# Patient Record
Sex: MALE | Race: BLACK OR AFRICAN AMERICAN | NOT HISPANIC OR LATINO | Employment: OTHER | URBAN - METROPOLITAN AREA
[De-identification: names, ages, dates, MRNs, and addresses within clinical notes are randomized per-mention and may not be internally consistent; named-entity substitution may affect disease eponyms.]

---

## 2017-01-19 ENCOUNTER — TELEPHONE (OUTPATIENT)
Dept: PAIN MEDICINE | Facility: CLINIC | Age: 62
End: 2017-01-19

## 2017-01-23 ENCOUNTER — LAB VISIT (OUTPATIENT)
Dept: LAB | Facility: HOSPITAL | Age: 62
End: 2017-01-23
Attending: INTERNAL MEDICINE
Payer: MEDICARE

## 2017-01-23 DIAGNOSIS — Z94.4 LIVER TRANSPLANTED: ICD-10-CM

## 2017-01-23 LAB
ALBUMIN SERPL BCP-MCNC: 3.3 G/DL
ALP SERPL-CCNC: 209 U/L
ALT SERPL W/O P-5'-P-CCNC: 111 U/L
ANION GAP SERPL CALC-SCNC: 7 MMOL/L
ANISOCYTOSIS BLD QL SMEAR: SLIGHT
AST SERPL-CCNC: 117 U/L
BASOPHILS # BLD AUTO: 0.01 K/UL
BASOPHILS NFR BLD: 0.1 %
BILIRUB SERPL-MCNC: 0.6 MG/DL
BUN SERPL-MCNC: 19 MG/DL
CALCIUM SERPL-MCNC: 8.6 MG/DL
CHLORIDE SERPL-SCNC: 108 MMOL/L
CO2 SERPL-SCNC: 25 MMOL/L
CREAT SERPL-MCNC: 1.2 MG/DL
DIFFERENTIAL METHOD: ABNORMAL
EOSINOPHIL # BLD AUTO: 0.4 K/UL
EOSINOPHIL NFR BLD: 5 %
ERYTHROCYTE [DISTWIDTH] IN BLOOD BY AUTOMATED COUNT: 14.7 %
EST. GFR  (AFRICAN AMERICAN): >60 ML/MIN/1.73 M^2
EST. GFR  (NON AFRICAN AMERICAN): >60 ML/MIN/1.73 M^2
GLUCOSE SERPL-MCNC: 113 MG/DL
HCT VFR BLD AUTO: 39.6 %
HGB BLD-MCNC: 13.5 G/DL
LYMPHOCYTES # BLD AUTO: 2.9 K/UL
LYMPHOCYTES NFR BLD: 37.8 %
MCH RBC QN AUTO: 29.1 PG
MCHC RBC AUTO-ENTMCNC: 34.1 %
MCV RBC AUTO: 85 FL
MONOCYTES # BLD AUTO: 0.8 K/UL
MONOCYTES NFR BLD: 10.9 %
NEUTROPHILS # BLD AUTO: 3.5 K/UL
NEUTROPHILS NFR BLD: 46.2 %
OVALOCYTES BLD QL SMEAR: ABNORMAL
PLATELET # BLD AUTO: 146 K/UL
PLATELET BLD QL SMEAR: ABNORMAL
PMV BLD AUTO: ABNORMAL FL
POIKILOCYTOSIS BLD QL SMEAR: SLIGHT
POTASSIUM SERPL-SCNC: 4.2 MMOL/L
PROT SERPL-MCNC: 7.1 G/DL
RBC # BLD AUTO: 4.64 M/UL
SODIUM SERPL-SCNC: 140 MMOL/L
TACROLIMUS BLD-MCNC: 10.2 NG/ML
WBC # BLD AUTO: 7.64 K/UL

## 2017-01-23 PROCEDURE — 36415 COLL VENOUS BLD VENIPUNCTURE: CPT | Mod: PO

## 2017-01-23 PROCEDURE — 80197 ASSAY OF TACROLIMUS: CPT

## 2017-01-23 PROCEDURE — 85025 COMPLETE CBC W/AUTO DIFF WBC: CPT

## 2017-01-23 PROCEDURE — 80053 COMPREHEN METABOLIC PANEL: CPT

## 2017-01-23 NOTE — TELEPHONE ENCOUNTER
----- Message from Simona Aburto sent at 1/23/2017 12:13 PM CST -----  Contact: [phone]/ self   Pt its requesting a refill on rx blood sugar diagnostic Strp sent [phone]. Pt its requesting the prescription as soon as possible .  Please advise

## 2017-01-23 NOTE — TELEPHONE ENCOUNTER
----- Message from Rula Rousseau sent at 1/23/2017  9:29 AM CST -----  Contact: [phone] Ami monico Gonzalez would like to know the status of patient test strips, she states  [facility] reading was 300    [facility]

## 2017-01-24 NOTE — TELEPHONE ENCOUNTER
Call returned to pharmacy.  Pharmacy stated that Md needs to place IDC code on script for testing strips.  Dr. Lopez has been informed to send over script with ICD code.

## 2017-01-24 NOTE — TELEPHONE ENCOUNTER
Received a call from the phone staff. Spoke with the patient's wife. i informed her the prescription is being sent to [facility]. She verbalized understanding

## 2017-01-24 NOTE — TELEPHONE ENCOUNTER
Diabetic testing strips have just been sent over to [facility].  Pt's wife is aware.

## 2017-01-24 NOTE — TELEPHONE ENCOUNTER
----- Message from Yue Bailey sent at 1/24/2017  8:53 AM CST -----  Contact: [facility] [phone]   Calling to talk to nurse concerning patients test stripes does not have a diagnoses codes for insurance purpose. Please advice

## 2017-01-24 NOTE — TELEPHONE ENCOUNTER
Pharmacy stating that they can not fill the script without the ICD code.  Pt is currently at [facility].  Please advise

## 2017-01-25 NOTE — TELEPHONE ENCOUNTER
Call placed to pt's wife, Mrs. Gonzalez.  Mrs. Gonzalez was informed that pt's diabetic strips were sent over to pharmacy on yesterday electronically and receipt was confirmed.  Mrs. Gonzalez was also informed that a hard copy was faxed over this morning and that she is welcome to come pick it up to have.  Mrs. Gonzalez verbalized understanding.

## 2017-01-26 ENCOUNTER — TELEPHONE (OUTPATIENT)
Dept: TRANSPLANT | Facility: CLINIC | Age: 62
End: 2017-01-26

## 2017-01-26 NOTE — LETTER
January 26, 2017    Vick Gonzalez  [address] [facility] [address]          Dear Vick Gonzalez:  MRN: [id number]    Your liver lab results remain elevated but are stable.  There are no medicine changes.  Please have your labs drawn again on 2/27/17.      If you cannot have your labs drawn on the scheduled date, it is your responsibility to call the transplant department to reschedule.  To reschedule or make an appointment, please as to speak to or leave a message for my assistant, Portia Lincoln, at [phone].  When leaving a message for Latonia Pearce, or myself, we ask that you leave a brief message regarding your request.    Sincerely,    Kandy Herrera, RN, BSN  Liver Transplant Coordinator  [facility]  [address] [place] [address] [phone]

## 2017-02-21 ENCOUNTER — HOSPITAL ENCOUNTER (EMERGENCY)
Facility: HOSPITAL | Age: 62
Discharge: HOME | End: 2017-02-22
Attending: EMERGENCY MEDICINE
Payer: MEDICARE

## 2017-02-21 DIAGNOSIS — R51.9 HEADACHE, UNSPECIFIED HEADACHE TYPE: Primary | ICD-10-CM

## 2017-02-21 DIAGNOSIS — R05.9 COUGH: ICD-10-CM

## 2017-02-21 DIAGNOSIS — I10 ESSENTIAL HYPERTENSION: ICD-10-CM

## 2017-02-21 DIAGNOSIS — Z79.4 ENCOUNTER FOR LONG-TERM (CURRENT) USE OF INSULIN: ICD-10-CM

## 2017-02-21 DIAGNOSIS — R73.9 HYPERGLYCEMIA: ICD-10-CM

## 2017-02-21 DIAGNOSIS — Z94.4 LIVER TRANSPLANT STATUS: ICD-10-CM

## 2017-02-21 LAB
ALLENS TEST: ABNORMAL
B-OH-BUTYR BLD STRIP-SCNC: 0.1 MMOL/L
GLUCOSE SERPL-MCNC: 347 MG/DL (ref 70–110)
HCO3 UR-SCNC: 27.4 MMOL/L (ref 24–28)
HCT VFR BLD CALC: 46 %PCV (ref 36–54)
PCO2 BLDA: 53.3 MMHG (ref 35–45)
PH SMN: 7.32 [PH] (ref 7.35–7.45)
PO2 BLDA: 21 MMHG (ref 40–60)
POC BE: 1 MMOL/L
POC IONIZED CALCIUM: 1.28 MMOL/L (ref 1.06–1.42)
POC SATURATED O2: 30 % (ref 95–100)
POC TCO2: 29 MMOL/L (ref 24–29)
POCT GLUCOSE: 347 MG/DL (ref 70–110)
POTASSIUM BLD-SCNC: 4.4 MMOL/L (ref 3.5–5.1)
SAMPLE: ABNORMAL
SITE: ABNORMAL
SODIUM BLD-SCNC: 137 MMOL/L (ref 136–145)

## 2017-02-21 PROCEDURE — 84295 ASSAY OF SERUM SODIUM: CPT

## 2017-02-21 PROCEDURE — 83690 ASSAY OF LIPASE: CPT

## 2017-02-21 PROCEDURE — 96366 THER/PROPH/DIAG IV INF ADDON: CPT

## 2017-02-21 PROCEDURE — 99284 EMERGENCY DEPT VISIT MOD MDM: CPT | Mod: 25

## 2017-02-21 PROCEDURE — 96375 TX/PRO/DX INJ NEW DRUG ADDON: CPT

## 2017-02-21 PROCEDURE — 83036 HEMOGLOBIN GLYCOSYLATED A1C: CPT

## 2017-02-21 PROCEDURE — 82803 BLOOD GASES ANY COMBINATION: CPT

## 2017-02-21 PROCEDURE — 85610 PROTHROMBIN TIME: CPT

## 2017-02-21 PROCEDURE — 93005 ELECTROCARDIOGRAM TRACING: CPT

## 2017-02-21 PROCEDURE — 99900035 HC TECH TIME PER 15 MIN (STAT)

## 2017-02-21 PROCEDURE — 82962 GLUCOSE BLOOD TEST: CPT

## 2017-02-21 PROCEDURE — 85014 HEMATOCRIT: CPT

## 2017-02-21 PROCEDURE — 82330 ASSAY OF CALCIUM: CPT

## 2017-02-21 PROCEDURE — 25000003 PHARM REV CODE 250: Performed by: EMERGENCY MEDICINE

## 2017-02-21 PROCEDURE — 96361 HYDRATE IV INFUSION ADD-ON: CPT

## 2017-02-21 PROCEDURE — 99285 EMERGENCY DEPT VISIT HI MDM: CPT | Mod: ,,, | Performed by: EMERGENCY MEDICINE

## 2017-02-21 PROCEDURE — 83735 ASSAY OF MAGNESIUM: CPT

## 2017-02-21 PROCEDURE — 80053 COMPREHEN METABOLIC PANEL: CPT

## 2017-02-21 PROCEDURE — 93010 ELECTROCARDIOGRAM REPORT: CPT | Mod: ,,, | Performed by: INTERNAL MEDICINE

## 2017-02-21 PROCEDURE — 80197 ASSAY OF TACROLIMUS: CPT

## 2017-02-21 PROCEDURE — 63600175 PHARM REV CODE 636 W HCPCS: Performed by: EMERGENCY MEDICINE

## 2017-02-21 PROCEDURE — 83880 ASSAY OF NATRIURETIC PEPTIDE: CPT

## 2017-02-21 PROCEDURE — 96365 THER/PROPH/DIAG IV INF INIT: CPT

## 2017-02-21 PROCEDURE — 82010 KETONE BODYS QUAN: CPT

## 2017-02-21 PROCEDURE — 84132 ASSAY OF SERUM POTASSIUM: CPT

## 2017-02-21 PROCEDURE — 85025 COMPLETE CBC W/AUTO DIFF WBC: CPT

## 2017-02-21 RX ORDER — ONDANSETRON 2 MG/ML
4 INJECTION INTRAMUSCULAR; INTRAVENOUS
Status: COMPLETED | OUTPATIENT
Start: 2017-02-21 | End: 2017-02-21

## 2017-02-21 RX ORDER — MORPHINE SULFATE 2 MG/ML
4 INJECTION, SOLUTION INTRAMUSCULAR; INTRAVENOUS
Status: COMPLETED | OUTPATIENT
Start: 2017-02-21 | End: 2017-02-21

## 2017-02-21 RX ADMIN — SODIUM CHLORIDE 500 ML: 0.9 INJECTION, SOLUTION INTRAVENOUS at 11:02

## 2017-02-21 RX ADMIN — MORPHINE SULFATE 4 MG: 2 INJECTION, SOLUTION INTRAMUSCULAR; INTRAVENOUS at 11:02

## 2017-02-21 RX ADMIN — ONDANSETRON 4 MG: 2 INJECTION INTRAMUSCULAR; INTRAVENOUS at 11:02

## 2017-02-21 NOTE — ED AVS SNAPSHOT
[facility]  [address]  [facility] [address]               Vick Gonzalez   2017 10:53 PM   ED    Description:  Male : 1955   Department:  [facility]           Your Care was Coordinated By:     Provider Role From To    Shawnee Morejon MD Attending Provider 17 2191 --      Reason for Visit     Hyperglycemia           Diagnoses this Visit        Comments    Headache, unspecified headache type    -  Primary     Cough         Hyperglycemia         Essential hypertension           ED Disposition     None           To Do List           Follow-up Information     Schedule an appointment as soon as possible for a visit with Emanuel Lopez MD.    Specialty:  Family Medicine    Why:  For re-evaluation of your symptoms    Contact information:    [address]  [address]  [place] [id number] [phone]        [facility]     [facility] -  Assistance  Registered nurses in the [facility] provide clinical advisement, health education, appointment booking, and other advisory services.  Call for this free service at [phone].             Medications           Message regarding Medications     Verify the changes and/or additions to your medication regime listed below are the same as discussed with your clinician today.  If any of these changes or additions are incorrect, please notify your healthcare provider.        These medications were administered today        Dose Freq    morphine injection 4 mg 4 mg ED 1 Time    Sig: Inject 2 mLs (4 mg total) into the vein ED 1 Time.    Class: Normal    Route: Intravenous    ondansetron injection 4 mg 4 mg ED 1 Time    Sig: Inject 4 mg into the vein ED 1 Time.    Class: Normal    Route: Intravenous    sodium chloride 0.9% bolus 500 mL 500 mL ED 1 Time    Sig: Inject 500 mLs into the vein ED 1 Time.    Class: Normal    Route: Intravenous    magnesium  sulfate 2g in water 50mL IVPB (premix) 2 g ED 1 Time    Sig: Inject 50 mLs (2 g total) into the vein ED 1 Time.    Class: Normal    Route: Intravenous    insulin regular injection 10 Units 10 Units ED 1 Time    Sig: Inject 10 Units into the vein ED 1 Time.    Class: Normal    Route: Intravenous    oxycodone immediate release tablet 10 mg 10 mg ED 1 Time    Sig: Take 2 tablets (10 mg total) by mouth ED 1 Time.    Class: Normal    Route: Oral           Verify that the below list of medications is an accurate representation of the medications you are currently taking.  If none reported, the list may be blank. If incorrect, please contact your healthcare provider. Carry this list with you in case of emergency.           Current Medications     allopurinol (ZYLOPRIM) 100 MG tablet Take 1 tablet (100 mg total) by mouth 2 (two) times daily.    amitriptyline (ELAVIL) 25 MG tablet Take 1 tablet (25 mg total) by mouth every evening. For nerve pain and sleep    blood sugar diagnostic Strp 1 strip by Misc.(Non-Drug; Combo Route) route 4 (four) times daily before meals and nightly.    blood-glucose meter Misc 1 each by Misc.(Non-Drug; Combo Route) route 4 (four) times daily before meals and nightly.    calcium carbonate-vitamin D3 (CALTRATE 600 + D) 600 mg(1,500mg) -400 unit Chew     colchicine (COLCRYS) 0.6 mg tablet For impending gout attack take 2 colcrys followed by one one hour later.    gabapentin (NEURONTIN) 600 MG tablet Take 1 tablet (600 mg total) by mouth 4 (four) times daily.    insulin aspart (NOVOLOG) 100 unit/mL InPn pen Inject 12 Units into the skin 3 (three) times daily with meals.    insulin detemir (LEVEMIR FLEXTOUCH) 100 unit/mL (3 mL) SubQ InPn pen Inject 35 Units into the skin every evening.    lancets Misc 1 each by Misc.(Non-Drug; Combo Route) route 4 (four) times daily before meals and nightly.    lancing device Misc 1 each by Misc.(Non-Drug; Combo Route) route 4 (four) times daily before meals and  "nightly.    levothyroxine (SYNTHROID) 88 MCG tablet Take 1 tablet (88 mcg total) by mouth once daily.    lisinopril 10 MG tablet Take 1 tablet (10 mg total) by mouth once daily.    metoprolol tartrate (LOPRESSOR) 50 MG tablet TAKE 3 TABLETS BY MOUTH 2 (TWO) TIMES DAILY.    multivitamin (THERAGRAN) per tablet Take 1 tablet by mouth.    NEXIUM 40 mg capsule TAKE ONE CAPSULE BY MOUTH EVERY DAY    nifedipine (ADALAT CC) 30 MG TbSR TAKE 1 TABLET (30 MG TOTAL) BY MOUTH ONCE DAILY.    nifedipine 30 MG ORAL TR24 (PROCARDIA-XL) 30 MG (OSM) 24 hr tablet Take 1 tablet (30 mg total) by mouth once daily.    oxycodone (ROXICODONE) 15 MG Tab Take 1 tablet (15 mg total) by mouth every 6 (six) hours as needed for Pain.    pen needle, diabetic (ControlScan ULTRA-FINE MENDY PEN NEEDLES) 32 gauge x 5/32" Ndle Use with aspart TIDWM and with detemir QHS    sertraline (ZOLOFT) 100 MG tablet Take 1 tablet (100 mg total) by mouth once daily.    sildenafil (VIAGRA) 100 MG tablet Take 1 tablet (100 mg total) by mouth daily as needed for Erectile Dysfunction.    tacrolimus (PROGRAF) 1 MG Cap Take 2mg in AM and 1mg in PM by mouth Liver Transplant Z94.4    tea tree oil 100 % Oil Apply 1 mL topically 2 (two) times daily.    ZETIA 10 mg tablet TAKE 1 TABLET BY MOUTH EVERY DAY           Clinical Reference Information           Your Vitals Were     BP Pulse Temp Resp Height Weight    182/102 73 98.4 °F (36.9 °C) (Oral) 14 6' 1" (1.854 m) 99.8 kg (220 lb)    SpO2 BMI             96% 29.03 kg/m2         Allergies as of 2/22/2017        Reactions    Naproxen     Other reaction(s): problems w/liver/kid    Oxaprozin     Other reaction(s): cause problems w/kid/liver      Immunizations Administered on Date of Encounter - 2/22/2017     None      ED Micro, Lab, POCT     Start Ordered       Status Ordering Provider    02/22/17 0129 02/22/17 0128  POCT glucose  Once      Acknowledged     02/22/17 0036 02/22/17 0035  Tacrolimus level  Add-on      Completed     02/21/17 " 2355 02/21/17 2354  Brain natriuretic peptide  STAT      Final result     02/21/17 2339 02/21/17 2339  ISTAT PROCEDURE  Once      Final result     02/21/17 2321 02/21/17 2320  CBC auto differential  STAT      Final result     02/21/17 2321 02/21/17 2320  Comprehensive metabolic panel  STAT      Final result     02/21/17 2321 02/21/17 2320    STAT,   Status:  Canceled      Canceled     02/21/17 2321 02/21/17 2320  Lipase  STAT      Final result     02/21/17 2321 02/21/17 2320  Magnesium  STAT      Final result     02/21/17 2321 02/21/17 2320  Urinalysis  STAT      Final result     02/21/17 2321 02/21/17 2320  Protime-INR  STAT      Final result     02/21/17 2321 02/21/17 2320  Beta - Hydroxybutyrate, Serum  STAT      Final result     02/21/17 2321 02/21/17 2320  Hemoglobin A1c  Once      In process     02/21/17 2320 02/21/17 2320  Tacrolimus level  Once      In process     02/21/17 2320 02/21/17 2320  Urinalysis Microscopic  Once      Final result     02/21/17 2050 02/21/17 2050  POCT glucose  Once      Final result     02/21/17 2050 02/21/17 2049  POCT glucose  Once      Final result       ED Imaging Orders     Start Ordered       Status Ordering Provider    02/21/17 2321 02/21/17 2320  X-Ray Chest PA And Lateral  1 time imaging      Final result         Discharge Instructions       Keep blood sugar log. Keep a blood pressure log.   Take pain medication as needed for  Pain/headache   Drink plenty of water   Keep your follow up appointments     Future Appointments  Date Time Provider Department Center   2/27/2017 8:15 AM LAB, [facility] [place]          Your Scheduled Appointments     Feb 27, 2017  8:15 AM CST   Fasting Lab with ANDREY CONDE   [facility]    [address] [id number] [phone]              [facility] Sign-Up     Activating your [facility] account is as easy as 1-2-3!     1) Visit [email or web address], select Sign Up Now, enter this activation code and your  date of birth, then select Next.  [email or web address]  Expires: 4/8/2017  2:46 AM      2) Create a username and password to use when you visit [facility] in the future and select a security question in case you lose your password and select Next.    3) Enter your e-mail address and click Sign Up!    Additional Information  If you have questions, please e-mail [email or web address] or call [phone] to talk to our [facility] staff. Remember, [facility] is NOT to be used for urgent needs. For medical emergencies, dial 911.         Smoking Cessation     If you would like to quit smoking:   You may be eligible for free services if you are a [place] resident and started smoking cigarettes before September 1, 1988.  Call the Smoking Cessation Trust (SCT) toll free at (025) 624-0097 or (328) 109-4803.   Call 7-859-QUIT-NOW if you do not meet the above criteria.             [facility] complies with applicable Federal civil rights laws and does not discriminate on the basis of race, color, national origin, age, disability, or sex.        Language Assistance Services     ATTENTION: Language assistance services are available, free of charge. Please call [phone].      ATENCIÓN: Si habla español, tiene a palmer disposición servicios gratuitos de asistencia lingüística. Llame al [phone].     CHÚ Ý: N?u b?n nói Ti?ng Vi?t, có các d?ch v? h? tr? ngôn ng? mi?n phí dành cho b?n. G?i s? [phone].

## 2017-02-22 ENCOUNTER — TELEPHONE (OUTPATIENT)
Dept: FAMILY MEDICINE | Facility: CLINIC | Age: 62
End: 2017-02-22

## 2017-02-22 VITALS
SYSTOLIC BLOOD PRESSURE: 182 MMHG | DIASTOLIC BLOOD PRESSURE: 102 MMHG | BODY MASS INDEX: 29.16 KG/M2 | OXYGEN SATURATION: 96 % | WEIGHT: 220 LBS | HEIGHT: 73 IN | RESPIRATION RATE: 14 BRPM | TEMPERATURE: 98 F | HEART RATE: 73 BPM

## 2017-02-22 LAB
ALBUMIN SERPL BCP-MCNC: 3 G/DL
ALP SERPL-CCNC: 286 U/L
ALT SERPL W/O P-5'-P-CCNC: 106 U/L
ANION GAP SERPL CALC-SCNC: 8 MMOL/L
ANISOCYTOSIS BLD QL SMEAR: SLIGHT
AST SERPL-CCNC: 112 U/L
BACTERIA #/AREA URNS AUTO: ABNORMAL /HPF
BASOPHILS # BLD AUTO: 0 K/UL
BASOPHILS NFR BLD: 0 %
BILIRUB SERPL-MCNC: 0.5 MG/DL
BILIRUB UR QL STRIP: NEGATIVE
BNP SERPL-MCNC: 16 PG/ML
BUN SERPL-MCNC: 25 MG/DL
CALCIUM SERPL-MCNC: 9.2 MG/DL
CHLORIDE SERPL-SCNC: 102 MMOL/L
CLARITY UR REFRACT.AUTO: CLEAR
CO2 SERPL-SCNC: 23 MMOL/L
COLOR UR AUTO: YELLOW
CREAT SERPL-MCNC: 1.5 MG/DL
DACRYOCYTES BLD QL SMEAR: ABNORMAL
DIFFERENTIAL METHOD: ABNORMAL
EOSINOPHIL # BLD AUTO: 0.2 K/UL
EOSINOPHIL NFR BLD: 3.2 %
ERYTHROCYTE [DISTWIDTH] IN BLOOD BY AUTOMATED COUNT: 13.7 %
EST. GFR  (AFRICAN AMERICAN): 58.1 ML/MIN/1.73 M^2
EST. GFR  (NON AFRICAN AMERICAN): 50.2 ML/MIN/1.73 M^2
ESTIMATED AVG GLUCOSE: 212 MG/DL
GIANT PLATELETS BLD QL SMEAR: PRESENT
GLUCOSE SERPL-MCNC: 488 MG/DL
GLUCOSE UR QL STRIP: ABNORMAL
HBA1C MFR BLD HPLC: 9 %
HCT VFR BLD AUTO: 43 %
HGB BLD-MCNC: 14.5 G/DL
HGB UR QL STRIP: ABNORMAL
HYALINE CASTS UR QL AUTO: 5 /LPF
HYPOCHROMIA BLD QL SMEAR: ABNORMAL
INR PPP: 1
KETONES UR QL STRIP: NEGATIVE
LEUKOCYTE ESTERASE UR QL STRIP: NEGATIVE
LIPASE SERPL-CCNC: 63 U/L
LYMPHOCYTES # BLD AUTO: 2.3 K/UL
LYMPHOCYTES NFR BLD: 36.8 %
MAGNESIUM SERPL-MCNC: 1.2 MG/DL
MCH RBC QN AUTO: 29.1 PG
MCHC RBC AUTO-ENTMCNC: 33.7 %
MCV RBC AUTO: 86 FL
MICROSCOPIC COMMENT: ABNORMAL
MONOCYTES # BLD AUTO: 0.5 K/UL
MONOCYTES NFR BLD: 7.6 %
NEUTROPHILS # BLD AUTO: 3.2 K/UL
NEUTROPHILS NFR BLD: 51.8 %
NITRITE UR QL STRIP: NEGATIVE
OVALOCYTES BLD QL SMEAR: ABNORMAL
PH UR STRIP: 6 [PH] (ref 5–8)
PLATELET # BLD AUTO: 124 K/UL
PLATELET BLD QL SMEAR: ABNORMAL
PMV BLD AUTO: ABNORMAL FL
POIKILOCYTOSIS BLD QL SMEAR: SLIGHT
POTASSIUM SERPL-SCNC: 4.7 MMOL/L
PROT SERPL-MCNC: 7 G/DL
PROT UR QL STRIP: ABNORMAL
PROTHROMBIN TIME: 11 SEC
RBC # BLD AUTO: 4.99 M/UL
RBC #/AREA URNS AUTO: 1 /HPF (ref 0–4)
SODIUM SERPL-SCNC: 133 MMOL/L
SP GR UR STRIP: 1.01 (ref 1–1.03)
SQUAMOUS #/AREA URNS AUTO: 1 /HPF
TACROLIMUS BLD-MCNC: 14.4 NG/ML
URN SPEC COLLECT METH UR: ABNORMAL
UROBILINOGEN UR STRIP-ACNC: NEGATIVE EU/DL
WBC # BLD AUTO: 6.2 K/UL
WBC #/AREA URNS AUTO: 1 /HPF (ref 0–5)
YEAST UR QL AUTO: ABNORMAL

## 2017-02-22 PROCEDURE — 81001 URINALYSIS AUTO W/SCOPE: CPT

## 2017-02-22 PROCEDURE — 63600175 PHARM REV CODE 636 W HCPCS: Performed by: EMERGENCY MEDICINE

## 2017-02-22 PROCEDURE — 25000003 PHARM REV CODE 250: Performed by: EMERGENCY MEDICINE

## 2017-02-22 PROCEDURE — 82962 GLUCOSE BLOOD TEST: CPT

## 2017-02-22 RX ORDER — MAGNESIUM SULFATE HEPTAHYDRATE 40 MG/ML
2 INJECTION, SOLUTION INTRAVENOUS
Status: COMPLETED | OUTPATIENT
Start: 2017-02-22 | End: 2017-02-22

## 2017-02-22 RX ORDER — OXYCODONE HYDROCHLORIDE 5 MG/1
10 TABLET ORAL
Status: COMPLETED | OUTPATIENT
Start: 2017-02-22 | End: 2017-02-22

## 2017-02-22 RX ADMIN — OXYCODONE HYDROCHLORIDE 10 MG: 5 TABLET ORAL at 01:02

## 2017-02-22 RX ADMIN — MAGNESIUM SULFATE IN WATER 2 G: 40 INJECTION, SOLUTION INTRAVENOUS at 12:02

## 2017-02-22 RX ADMIN — INSULIN HUMAN 10 UNITS: 100 INJECTION, SOLUTION PARENTERAL at 01:02

## 2017-02-22 NOTE — DISCHARGE INSTRUCTIONS
Keep blood sugar log. Keep a blood pressure log.   Take pain medication as needed for  Pain/headache   Drink plenty of water   Keep your follow up appointments     Future Appointments  Date Time Provider Department Center   2/27/2017 8:15 AM LAB, [facility] [place]

## 2017-02-22 NOTE — TELEPHONE ENCOUNTER
----- Message from Chelsea Hameed sent at 2/22/2017  8:28 AM CST -----  Contact: [phone]  Pt need to be seen sooner than 4/18/2017 due to his glucose levels being 585 and was admitted to ER glucose was down to 285 at discharge pt was told to f/u with his PCP for medication evaluation ASAP. Please advise.

## 2017-02-22 NOTE — TELEPHONE ENCOUNTER
----- Message from Yue Bailey sent at 2/22/2017 10:02 AM CST -----  Contact: Self [phone]  Patient Returning Your Phone Call

## 2017-02-22 NOTE — ED PROVIDER NOTES
Encounter Date: 2/21/2017    SCRIBE #1 NOTE: I, Isidro Pérez, am scribing for, and in the presence of,  Dr. Morejon. I have scribed the entire note.       History     Chief Complaint   Patient presents with    Hyperglycemia     patient reports a blood sugar of 580 at home. Pt had a liver transplant in 2009.     Review of patient's allergies indicates:   Allergen Reactions    Naproxen      Other reaction(s): problems w/liver/kid    Oxaprozin      Other reaction(s): cause problems w/kid/liver     HPI Comments: Time patient was seen by the provider: 11:05 PM      The patient is a 61 y.o. male with hx of: DM, HTN, and liver transplant(2010) that presents to the ED with a complaint of hyperglycemia, which has persisted for the last 2 weeks. Blood sugar at home reported at 580. He notes an associated headache, urinary frequency, and non productive cough, which have coincided with elevated blood sugar. Pt endorses compliance with his medication regimen and denies any recent changes to this regimen. He denies any burning urination.   The history is provided by the patient.     Past Medical History   Diagnosis Date    Anemia     Anxiety     Chronic hepatitis C virus infection - RELAPSE following harvoni + RBV 8/23/2011     Completed 12 weeks Harvoni + RBV w/ RELAPSE 6 weeks out (3/2016)  Wk 1 Hgb 12.4, prograf 6.8 Wk 2 Hgb 12.7 Wk 4 Hgb 12.8, prograf 5.4; HCV RNA 47. INCREASE RBV to 600 Wk 5 Hgb 12.6 Wk 6 Hgb 12.4, HCV RNA <12, detected. INCREASE  Wk 7 Hgb 11.8 Wk 8 Hgb 11.7, prograf 4.0. INCREASE RBV 1000, INCREASE PROGRAF 2/1 Wk 9 Hgb 12.4, prograf 3.6  HCV RNA <12, detected. INCREASE PROGRAF 2/2 Wk 10 hgb     Chronic pain syndrome 7/13/2011    Diabetes mellitus type II, uncontrolled     Discitis of lumbosacral region 1/16/2015    ED (erectile dysfunction)     Genital herpes     Gout, arthritis     History of alcohol abuse     History of positive PPD, treatment status unknown      Pulmonary granulomas,  negative sputum cultures for AFB and indeterminate quantferon test    History of substance abuse     Hypertension     Hypothyroidism     Peptic ulcer disease      Past Medical History Pertinent Negatives   Diagnosis Date Noted    *Atrial fibrillation 6/28/2012    Cataract 6/28/2012    Clotting disorder 6/28/2012    Degenerative disc disease 6/28/2012     Past Surgical History   Procedure Laterality Date    Liver transplant  06/2010    Cholecystectomy      Spine surgery       Family History   Problem Relation Age of Onset    Cancer Mother     Diabetes Mother     Heart disease Mother     Diabetes Sister     Cancer Maternal Uncle 82     colon CA    Melanoma Neg Hx     Psoriasis Neg Hx     Lupus Neg Hx     Eczema Neg Hx     Acne Neg Hx      Social History   Substance Use Topics    Smoking status: Former Smoker    Smokeless tobacco: Never Used    Alcohol use No      Comment: over 5 years ago, none currently     Review of Systems   Constitutional: Negative for fever.   HENT: Negative for sore throat.    Respiratory: Positive for cough. Negative for shortness of breath.    Cardiovascular: Negative for chest pain.   Gastrointestinal: Negative for nausea.   Genitourinary: Positive for frequency. Negative for dysuria.        No burning urination   Musculoskeletal: Negative for back pain.   Skin: Negative for rash.   Neurological: Positive for headaches. Negative for weakness.   Hematological: Does not bruise/bleed easily.       Physical Exam   Initial Vitals   BP Pulse Resp Temp SpO2   02/21/17 2048 02/21/17 2048 02/21/17 2048 02/21/17 2048 02/21/17 2048   177/101 84 18 98.4 °F (36.9 °C) 96 %     Physical Exam    Nursing note and vitals reviewed.  Constitutional: He appears well-developed and well-nourished. No distress.   HENT:   Head: Normocephalic and atraumatic.   Mouth/Throat: Oropharynx is clear and moist.   Eyes: Conjunctivae are normal.   Neck: Normal range of motion.   Cardiovascular: Normal  rate, regular rhythm and normal heart sounds.   Pulmonary/Chest:   Crackles at the bilateral bases   Abdominal: Soft. He exhibits no distension.   Generalized abdominal tenderness    Musculoskeletal: Normal range of motion.   Neurological: He is alert and oriented to person, place, and time.   Skin: Skin is warm and dry.         ED Course   Procedures  Labs Reviewed   CBC W/ AUTO DIFFERENTIAL - Abnormal; Notable for the following:        Result Value    Platelets 124 (*)     Platelet Estimate Decreased (*)     All other components within normal limits   COMPREHENSIVE METABOLIC PANEL - Abnormal; Notable for the following:     Sodium 133 (*)     Glucose 488 (*)     BUN, Bld 25 (*)     Creatinine 1.5 (*)     Albumin 3.0 (*)     Alkaline Phosphatase 286 (*)      (*)      (*)     eGFR if  58.1 (*)     eGFR if non  50.2 (*)     All other components within normal limits   LIPASE - Abnormal; Notable for the following:     Lipase 63 (*)     All other components within normal limits   MAGNESIUM - Abnormal; Notable for the following:     Magnesium 1.2 (*)     All other components within normal limits   URINALYSIS - Abnormal; Notable for the following:     Protein, UA 1+ (*)     Glucose, UA 4+ (*)     Occult Blood UA Trace (*)     All other components within normal limits   URINALYSIS MICROSCOPIC - Abnormal; Notable for the following:     Hyaline Casts, UA 5 (*)     All other components within normal limits   POCT GLUCOSE MONITORING CONTINUOUS - Abnormal; Notable for the following:     POC Glucose 347 (*)     All other components within normal limits   POCT GLUCOSE - Abnormal; Notable for the following:     POCT Glucose 347 (*)     All other components within normal limits   ISTAT PROCEDURE - Abnormal; Notable for the following:     POC PH 7.318 (*)     POC PCO2 53.3 (*)     POC PO2 21 (*)     POC SATURATED O2 30 (*)     All other components within normal limits   PROTIME-INR   BETA  - HYDROXYBUTYRATE, SERUM   B-TYPE NATRIURETIC PEPTIDE   TACROLIMUS LEVEL   HEMOGLOBIN A1C   TACROLIMUS LEVEL    Narrative:     add on TACRO order-[id number] per MD Shawnee Morejon 01:08  02/22/2017    POCT GLUCOSE MONITORING CONTINUOUS     EKG Readings: (Independently Interpreted)   Sinus at 75, no STEMI, non specific ST depression       X-Rays:   Independently Interpreted Readings:   Chest X-Ray: Normal heart size.  No infiltrates.  No acute abnormalities.     Medical Decision Making:   History:   Old Medical Records: I decided to obtain old medical records.  Initial Assessment:   Emergent evaluation of hyperglycemia. Pt has a history of liver transplant and reports compliance with medications. Will evaluate for DKA, electrolyte abnormality, or infectious etiologies.   Independently Interpreted Test(s):   I have ordered and independently interpreted X-rays - see prior notes.  I have ordered and independently interpreted EKG Reading(s) - see prior notes  Clinical Tests:   Lab Tests: Ordered and Reviewed  Radiological Study: Ordered and Reviewed  Medical Tests: Ordered and Reviewed            Scribe Attestation:   Scribe #1: I performed the above scribed service and the documentation accurately describes the services I performed. I attest to the accuracy of the note.    Attending Attestation:           Physician Attestation for Scribe:  Physician Attestation Statement for Scribe #1: I, Dr. Morejon, reviewed documentation, as scribed by Isidro Pérez in my presence, and it is both accurate and complete.         Attending ED Notes:   Pt's PH is 7.3 but otherwise has no signs of DKA. LFTs are not significantly changed. Magnesium low and replacement was given. His headache has been unchanged by morphine and magnesium. Will try oxycodone and give dose of insulin. Pt had follow up appointment with [facility] primary care doctor this week and missed it.     Update:patients symptoms resolved. Recommended glucose log needs to follow up  with primary care doctor for insulin adjustments. I don't feel the patient needs to be admitted to the hospital at this time. Return precautions advised.           ED Course     Clinical Impression:   The primary encounter diagnosis was Headache, unspecified headache type. Diagnoses of Cough, Hyperglycemia, and Essential hypertension were also pertinent to this visit.    Disposition:   Disposition: Discharged  Condition: Stable       Shawnee Morejon MD  02/22/17 0323

## 2017-02-22 NOTE — TELEPHONE ENCOUNTER
Call returned to pt.  Pt stated that his blood sugar has been elevated and would like to be seen.  Pt requested to be seen as soon as possible.  Pt has an appt with Dr. Perkins on tomorrow.

## 2017-02-22 NOTE — ED TRIAGE NOTES
61 year old male presents to the ED c/o hyperglycemia. Reports no changes in diet and has been compliant with medication.

## 2017-02-22 NOTE — TELEPHONE ENCOUNTER
----- Message from Cecilia Vega sent at 2/22/2017  1:54 PM CST -----  Contact: [phone]/ pt's wife   Patient called in returning your call about scheduling. Please advise.

## 2017-02-23 ENCOUNTER — OFFICE VISIT (OUTPATIENT)
Dept: FAMILY MEDICINE | Facility: CLINIC | Age: 62
End: 2017-02-23
Payer: MEDICARE

## 2017-02-23 VITALS
DIASTOLIC BLOOD PRESSURE: 91 MMHG | HEART RATE: 101 BPM | WEIGHT: 270.06 LBS | OXYGEN SATURATION: 97 % | HEIGHT: 73 IN | BODY MASS INDEX: 35.79 KG/M2 | SYSTOLIC BLOOD PRESSURE: 145 MMHG

## 2017-02-23 DIAGNOSIS — I10 BENIGN ESSENTIAL HTN: ICD-10-CM

## 2017-02-23 DIAGNOSIS — E11.65 UNCONTROLLED TYPE 2 DIABETES MELLITUS WITH OTHER DIABETIC KIDNEY COMPLICATION, UNSPECIFIED LONG TERM INSULIN USE STATUS: ICD-10-CM

## 2017-02-23 DIAGNOSIS — E11.29 UNCONTROLLED TYPE 2 DIABETES MELLITUS WITH OTHER DIABETIC KIDNEY COMPLICATION, UNSPECIFIED LONG TERM INSULIN USE STATUS: ICD-10-CM

## 2017-02-23 DIAGNOSIS — Z79.4 TYPE 2 DIABETES MELLITUS WITH HYPERGLYCEMIA, WITH LONG-TERM CURRENT USE OF INSULIN: ICD-10-CM

## 2017-02-23 DIAGNOSIS — E11.65 TYPE 2 DIABETES MELLITUS WITH HYPERGLYCEMIA, WITH LONG-TERM CURRENT USE OF INSULIN: ICD-10-CM

## 2017-02-23 PROCEDURE — 99215 OFFICE O/P EST HI 40 MIN: CPT | Mod: S$PBB,,, | Performed by: FAMILY MEDICINE

## 2017-02-23 PROCEDURE — 99215 OFFICE O/P EST HI 40 MIN: CPT | Mod: PBBFAC,PO | Performed by: FAMILY MEDICINE

## 2017-02-23 PROCEDURE — 99999 PR PBB SHADOW E&M-EST. PATIENT-LVL V: CPT | Mod: PBBFAC,,, | Performed by: FAMILY MEDICINE

## 2017-02-23 RX ORDER — NIFEDIPINE 60 MG/1
60 TABLET, EXTENDED RELEASE ORAL DAILY
Qty: 90 TABLET | Refills: 11 | Status: SHIPPED | OUTPATIENT
Start: 2017-02-23 | End: 2018-02-24 | Stop reason: SDUPTHER

## 2017-02-23 NOTE — PROGRESS NOTES
(Portions of this note were dictated using voice recognition software and may contain dictation related errors in spelling/grammar/syntax not found on text review)    CC:   Chief Complaint   Patient presents with    Blood Sugar Problem       HPI: 61 y.o. male presents for hospital and emergency room follow-up.  My last visit with him was in August 2016 Is a history of diabetes which has been typically fairly well diet controlled as of my last visit with him, in the I FG status.  His A1c was 5.6 earlier in January 2016.  After my visit, repeat testing demonstrated A1c up to 8.3.  I started him on metformin at the time.  However, he presented later to the hospital on October 4, 2015 complaining of abdominal pain, lower back pain, flank pain, increased appetite, nausea, polydipsia, and polyuria.  There was no reported vomiting or weight loss.  At that time, his blood sugar was in the 1000 range.  He was diagnosed with hyperosmolar state and DKA.  He was started on insulin therapy in the hospital and given fluids.  He was converted over to basal/prandial insulin as per the hospital discharge summary, he was discharged on Levemir 34 units daily and NovoLog 12 units with meals    He was supposed to follow-up in the clinic earlier this week but did not have a ride and missed his appointment.  He later presented to the emergency room complaining of a blood sugar of 580 at home.  He was complaining of associated headache, urinary frequency, and cough alongside this elevated blood sugar.  He had denied any compliance problems with his current therapy.  His blood sugar in the emergency room was 488, sodium 133, creatinine 1.5, which is up from a fairly normal baseline for after his hospitalization.  ALT is chronically elevated since hospital stay.  He also had a CT of the abdomen and pelvis.  Pancreas is unremarkable, adrenal glands unremarkable.  Small splenic granulomas noted.  Bowel is except for diverticulosis without  diverticulitis.  Kidneys normal with some vascular calcifications noted otherwise no evidence of hydronephrosis no other structural disease    Presents today in the office for further review.  He does complain of polyuria, dry mouth, polydipsia.  Complains of loss of sex drive.  No chest pain or shortness of breath.  He is compliant with the above therapy, up to 36 units of Levemir at this time and 12 units of NovoLog with food.  He checks his sugars the morning when he wakes up before eating in the area usually in the 200-300 range or no 400 readings.  No readings less than 200.  He will take his NovoLog dose at that time with breakfast.  At 12 noon he will check his sugar again prior to lunch and getting readings around 180s.  Denies any 200 readings at that time.  He will take his lunchtime NovoLog with lunch at 12 units again.  3:00 PM he checks his sugars and gets around 200 readings, and will then later take his dinner NovoLog and eat dinner.  Denies any hypoglycemia    High blood pressure: On lisinopril 10 mg a day, metoprolol 50 mrem twice a day, nifedipine 30 mg a day.  Blood pressures are high at home usually above goal of 140/90.  Compliant with therapy        Past Medical History   Diagnosis Date    Anemia     Anxiety     Chronic hepatitis C virus infection - RELAPSE following harvoni + RBV 8/23/2011     Completed 12 weeks Harvoni + RBV w/ RELAPSE 6 weeks out (3/2016)  Wk 1 Hgb 12.4, prograf 6.8 Wk 2 Hgb 12.7 Wk 4 Hgb 12.8, prograf 5.4; HCV RNA 47. INCREASE RBV to 600 Wk 5 Hgb 12.6 Wk 6 Hgb 12.4, HCV RNA <12, detected. INCREASE  Wk 7 Hgb 11.8 Wk 8 Hgb 11.7, prograf 4.0. INCREASE RBV 1000, INCREASE PROGRAF 2/1 Wk 9 Hgb 12.4, prograf 3.6  HCV RNA <12, detected. INCREASE PROGRAF 2/2 Wk 10 hgb     Chronic pain syndrome 7/13/2011    Diabetes mellitus type II, uncontrolled     Discitis of lumbosacral region 1/16/2015    ED (erectile dysfunction)     Genital herpes     Gout, arthritis      History of alcohol abuse     History of positive PPD, treatment status unknown      Pulmonary granulomas, negative sputum cultures for AFB and indeterminate quantferon test    History of substance abuse     Hypertension     Hypothyroidism     Peptic ulcer disease        Past Surgical History   Procedure Laterality Date    Liver transplant  06/2010    Cholecystectomy      Spine surgery         Family History   Problem Relation Age of Onset    Cancer Mother     Diabetes Mother     Heart disease Mother     Diabetes Sister     Cancer Maternal Uncle 82     colon CA    Melanoma Neg Hx     Psoriasis Neg Hx     Lupus Neg Hx     Eczema Neg Hx     Acne Neg Hx        Social History     Social History    Marital status:      Spouse name: N/A    Number of children: N/A    Years of education: N/A     Occupational History    Not on file.     Social History Main Topics    Smoking status: Former Smoker    Smokeless tobacco: Never Used    Alcohol use No      Comment: over 5 years ago, none currently    Drug use: No    Sexual activity: Not on file     Other Topics Concern    Not on file     Social History Narrative     SCREENINGS  Colonoscopy 2015, return in 10 years   prostate testing 2016/8     Immunizations  Pneumovax up-to-date 2008  Hepatitis B series up-to-date  Patient states he had tetanus shot within the last 10 years  Flu: UTD    Lab Results   Component Value Date    WBC 6.20 02/21/2017    HGB 14.5 02/21/2017    HCT 46 02/21/2017     (L) 02/21/2017    CHOL 97 (L) 10/06/2016    TRIG 276 (H) 10/06/2016    HDL 21 (L) 10/06/2016     (H) 02/21/2017     (H) 02/21/2017     (L) 02/21/2017    K 4.7 02/21/2017     02/21/2017    CREATININE 1.5 (H) 02/21/2017    BUN 25 (H) 02/21/2017    CO2 23 02/21/2017    TSH 1.941 09/06/2016    PSA 0.30 09/06/2016    INR 1.0 02/21/2017    HGBA1C 9.0 (H) 02/21/2017    LDLCALC 20.8 (L) 10/06/2016     (HH) 02/21/2017        ROS:  GENERAL: Malaise.  SKIN: No rashes, no itching.  HEAD: No headaches.  EYES: No visual changes  EARS: No ear pain or changes in hearing.  NOSE: No congestion or rhinorrhea.  MOUTH & THROAT: No hoarseness, change in voice, or sore throat.  NODES: Denies swollen glands.  CHEST: Denies NARAYANAN, cyanosis, wheezing, cough and sputum production.  CARDIOVASCULAR: Denies chest pain, PND, orthopnea.  ABDOMEN: Above.  URINARY: Above.  PERIPHERAL VASCULAR: No claudication or cyanosis.  MUSCULOSKELETAL: No joint stiffness or swelling. Denies back pain.  NEUROLOGIC: Does get burning pain in his legs    Vital signs reviewed  PE:   APPEARANCE: Well nourished, well developed, in no acute distress.    HEAD: Normocephalic, atraumatic.  EYES: PERRL. EOMI.   Conjunctivae noninjected.  EARS: TM's intact. Light reflex normal. No retraction or perforation  NOSE: Mucosa pink. Airway clear.  MOUTH & THROAT: No tonsillar enlargement. No pharyngeal erythema or exudate.   NECK: Supple with no cervical lymphadenopathy.  No carotid bruits.  No thyromegaly  CHEST: Good inspiratory effort. Lungs clear to auscultation with no wheezes or crackles.  CARDIOVASCULAR: Normal S1, S2. No rubs, murmurs, or gallops.  ABDOMEN: Bowel sounds normal. Not distended. Soft. No tenderness  EXTREMITIES: Minimal ankle edema bilaterally      IMPRESSION  1. Uncontrolled type 2 diabetes mellitus with diabetic neuropathy, with long-term current use of insulin    2. Uncontrolled type 2 diabetes mellitus with other diabetic kidney complication, unspecified long term insulin use status    3. Benign essential HTN    4. Type 2 diabetes mellitus with hyperglycemia, with long-term current use of insulin            PLAN  Orders Placed This Encounter   Procedures    Ambulatory consult to Diabetic Education     Reviewed hospitalization summary.  Reviewed his labs as above.  Reviewed imaging as above    Diabetes health maintenance:  -- advise regular and consistent glucose  monitoring and medication compliance.  -- advise daily foot checks  -- advise yearly ophthalmologic exams  -- advise adequate dietary and exercise modification  -- advise regular dental visits  -- advise daily low-dose aspirin use if patient is not on other anticoagulants and there are no other contraindications.  -- Medication management: Increase Levemir up to 45 units daily.  3 day titration protocol provided per patient instructions.  Continue NovoLog 12 units with meals.  Correction scale provided per patient instructions    He is instructed on charting his blood sugars both fasting and before meals and before bedtime.  Record down these numbers in his chart and bring them in to follow-up visit in one month.  In the meantime he can call with any concerns about spiking blood sugars.  I have set up a consult with diabetes education and nutrition management.    Hypertension not controlled.  Increase nifedipine to 60 mg daily.  Continue lisinopril 10 mg daily and metoprolol 50 mg twice a day    RTC one month or sooner if needed

## 2017-02-23 NOTE — PATIENT INSTRUCTIONS
Blood pressure:  You're on lisinopril 10 mg daily, metoprolol 50 mg twice daily, nifedipine 30 mg daily. I'll INCREASE your nifedipine to 60 mg daily        Diabetes:  Guideline for managing Levemir insulin: start by going up to 45 units daily    Check your fasting blood sugars every morning for 3 days.  Goal is to have most of your fasting blood sugars below 140.    If most of your fasting blood sugars in that 3 day period are above 140, increase your Levemir dose by 3 units.    If most of your fasting blood sugars in that 3 day period of are below 140 but higher than 70, keep your Levemir dose the same.    If most of your fasting blood sugars in that 3 day period are below 70, decrease your Levemir dose by 3 units.    Make sure to write your blood sugars down in a notebook; do not simply just store the numbers in your glucose meter.        Novolog: keep at 12 units with meals now    Sliding Scale for Novolog             Blood Glucose reading    :     how many extra units of Novolog to take                150-200                    :           +2                 200-250  : +4      250-300  : +6                 300-350  :  +8      >350   :           +10      SAMPLE BLOOD SUGAR CHART                 DATE  Before breakfast Before lunch Before dinner Before bedtime

## 2017-02-25 LAB
POCT GLUCOSE: 282 MG/DL (ref 70–110)
POCT GLUCOSE: 330 MG/DL (ref 70–110)

## 2017-02-27 ENCOUNTER — HOSPITAL ENCOUNTER (OUTPATIENT)
Facility: HOSPITAL | Age: 62
Discharge: HOME | End: 2017-02-28
Attending: EMERGENCY MEDICINE | Admitting: INTERNAL MEDICINE
Payer: MEDICARE

## 2017-02-27 ENCOUNTER — TELEPHONE (OUTPATIENT)
Dept: TRANSPLANT | Facility: CLINIC | Age: 62
End: 2017-02-27

## 2017-02-27 DIAGNOSIS — R73.9 HYPERGLYCEMIA: Primary | ICD-10-CM

## 2017-02-27 LAB
ALBUMIN SERPL BCP-MCNC: 3.2 G/DL
ALP SERPL-CCNC: 276 U/L
ALT SERPL W/O P-5'-P-CCNC: 119 U/L
ANION GAP SERPL CALC-SCNC: 10 MMOL/L
ANISOCYTOSIS BLD QL SMEAR: SLIGHT
AST SERPL-CCNC: 146 U/L
BASOPHILS # BLD AUTO: 0.01 K/UL
BASOPHILS NFR BLD: 0.2 %
BILIRUB SERPL-MCNC: 0.8 MG/DL
BUN SERPL-MCNC: 24 MG/DL (ref 6–30)
BUN SERPL-MCNC: 27 MG/DL
CALCIUM SERPL-MCNC: 9.1 MG/DL
CHLORIDE SERPL-SCNC: 102 MMOL/L (ref 95–110)
CHLORIDE SERPL-SCNC: 99 MMOL/L
CO2 SERPL-SCNC: 23 MMOL/L
CREAT SERPL-MCNC: 1.2 MG/DL (ref 0.5–1.4)
CREAT SERPL-MCNC: 1.7 MG/DL
DIFFERENTIAL METHOD: ABNORMAL
EOSINOPHIL # BLD AUTO: 0.3 K/UL
EOSINOPHIL NFR BLD: 4.5 %
ERYTHROCYTE [DISTWIDTH] IN BLOOD BY AUTOMATED COUNT: 13.5 %
EST. GFR  (AFRICAN AMERICAN): 49.9 ML/MIN/1.73 M^2
EST. GFR  (NON AFRICAN AMERICAN): 43.2 ML/MIN/1.73 M^2
GLUCOSE SERPL-MCNC: 404 MG/DL
GLUCOSE SERPL-MCNC: 98 MG/DL (ref 70–110)
HCT VFR BLD AUTO: 41.5 %
HCT VFR BLD CALC: 39 %PCV (ref 36–54)
HGB BLD-MCNC: 14.1 G/DL
HYPOCHROMIA BLD QL SMEAR: ABNORMAL
INR PPP: 1
LIPASE SERPL-CCNC: 152 U/L
LYMPHOCYTES # BLD AUTO: 2.4 K/UL
LYMPHOCYTES NFR BLD: 38.6 %
MCH RBC QN AUTO: 29.1 PG
MCHC RBC AUTO-ENTMCNC: 34 %
MCV RBC AUTO: 86 FL
MONOCYTES # BLD AUTO: 0.5 K/UL
MONOCYTES NFR BLD: 7.8 %
NEUTROPHILS # BLD AUTO: 3 K/UL
NEUTROPHILS NFR BLD: 48.9 %
OVALOCYTES BLD QL SMEAR: ABNORMAL
PLATELET # BLD AUTO: 127 K/UL
PMV BLD AUTO: ABNORMAL FL
POC IONIZED CALCIUM: 1.12 MMOL/L (ref 1.06–1.42)
POC TCO2 (MEASURED): 24 MMOL/L (ref 23–29)
POCT GLUCOSE: 125 MG/DL (ref 70–110)
POCT GLUCOSE: 376 MG/DL (ref 70–110)
POIKILOCYTOSIS BLD QL SMEAR: SLIGHT
POLYCHROMASIA BLD QL SMEAR: ABNORMAL
POTASSIUM BLD-SCNC: 3.3 MMOL/L (ref 3.5–5.1)
POTASSIUM SERPL-SCNC: 3.9 MMOL/L
PROT SERPL-MCNC: 7.3 G/DL
PROTHROMBIN TIME: 10.9 SEC
RBC # BLD AUTO: 4.84 M/UL
SAMPLE: ABNORMAL
SODIUM BLD-SCNC: 139 MMOL/L (ref 136–145)
SODIUM SERPL-SCNC: 132 MMOL/L
TSH SERPL DL<=0.005 MIU/L-ACNC: 3.19 UIU/ML
WBC # BLD AUTO: 6.17 K/UL

## 2017-02-27 PROCEDURE — 25000003 PHARM REV CODE 250: Performed by: EMERGENCY MEDICINE

## 2017-02-27 PROCEDURE — 84443 ASSAY THYROID STIM HORMONE: CPT

## 2017-02-27 PROCEDURE — 99284 EMERGENCY DEPT VISIT MOD MDM: CPT | Mod: ,,, | Performed by: EMERGENCY MEDICINE

## 2017-02-27 PROCEDURE — G0378 HOSPITAL OBSERVATION PER HR: HCPCS

## 2017-02-27 PROCEDURE — 82962 GLUCOSE BLOOD TEST: CPT

## 2017-02-27 PROCEDURE — 85025 COMPLETE CBC W/AUTO DIFF WBC: CPT

## 2017-02-27 PROCEDURE — 99285 EMERGENCY DEPT VISIT HI MDM: CPT

## 2017-02-27 PROCEDURE — 96361 HYDRATE IV INFUSION ADD-ON: CPT

## 2017-02-27 PROCEDURE — 85610 PROTHROMBIN TIME: CPT

## 2017-02-27 PROCEDURE — 63600175 PHARM REV CODE 636 W HCPCS: Performed by: EMERGENCY MEDICINE

## 2017-02-27 PROCEDURE — 83690 ASSAY OF LIPASE: CPT

## 2017-02-27 PROCEDURE — 80053 COMPREHEN METABOLIC PANEL: CPT

## 2017-02-27 PROCEDURE — 96374 THER/PROPH/DIAG INJ IV PUSH: CPT

## 2017-02-27 RX ORDER — LEVOTHYROXINE SODIUM 88 UG/1
88 TABLET ORAL DAILY
Status: DISCONTINUED | OUTPATIENT
Start: 2017-02-28 | End: 2017-02-28 | Stop reason: HOSPADM

## 2017-02-27 RX ORDER — PANTOPRAZOLE SODIUM 40 MG/1
40 TABLET, DELAYED RELEASE ORAL DAILY
Status: DISCONTINUED | OUTPATIENT
Start: 2017-02-28 | End: 2017-02-28 | Stop reason: HOSPADM

## 2017-02-27 RX ORDER — INSULIN ASPART 100 [IU]/ML
1-10 INJECTION, SOLUTION INTRAVENOUS; SUBCUTANEOUS
Status: DISCONTINUED | OUTPATIENT
Start: 2017-02-27 | End: 2017-02-28 | Stop reason: HOSPADM

## 2017-02-27 RX ORDER — SERTRALINE HYDROCHLORIDE 50 MG/1
100 TABLET, FILM COATED ORAL DAILY
Status: DISCONTINUED | OUTPATIENT
Start: 2017-02-28 | End: 2017-02-28 | Stop reason: HOSPADM

## 2017-02-27 RX ORDER — METOPROLOL TARTRATE 25 MG/1
50 TABLET, FILM COATED ORAL 2 TIMES DAILY
Status: DISCONTINUED | OUTPATIENT
Start: 2017-02-28 | End: 2017-02-28 | Stop reason: HOSPADM

## 2017-02-27 RX ORDER — AMITRIPTYLINE HYDROCHLORIDE 25 MG/1
25 TABLET, FILM COATED ORAL NIGHTLY
Status: DISCONTINUED | OUTPATIENT
Start: 2017-02-28 | End: 2017-02-28 | Stop reason: HOSPADM

## 2017-02-27 RX ORDER — TACROLIMUS 1 MG/1
1 CAPSULE ORAL ONCE
Status: COMPLETED | OUTPATIENT
Start: 2017-02-27 | End: 2017-02-27

## 2017-02-27 RX ORDER — OXYCODONE HYDROCHLORIDE 5 MG/1
10 TABLET ORAL
Status: COMPLETED | OUTPATIENT
Start: 2017-02-27 | End: 2017-02-27

## 2017-02-27 RX ORDER — GLUCAGON 1 MG
1 KIT INJECTION
Status: DISCONTINUED | OUTPATIENT
Start: 2017-02-27 | End: 2017-02-28 | Stop reason: HOSPADM

## 2017-02-27 RX ORDER — IBUPROFEN 200 MG
24 TABLET ORAL
Status: DISCONTINUED | OUTPATIENT
Start: 2017-02-27 | End: 2017-02-28 | Stop reason: HOSPADM

## 2017-02-27 RX ORDER — OXYCODONE HYDROCHLORIDE 5 MG/1
15 TABLET ORAL EVERY 6 HOURS PRN
Status: DISCONTINUED | OUTPATIENT
Start: 2017-02-27 | End: 2017-02-28

## 2017-02-27 RX ORDER — ACETAMINOPHEN 325 MG/1
650 TABLET ORAL
Status: DISCONTINUED | OUTPATIENT
Start: 2017-02-27 | End: 2017-02-27

## 2017-02-27 RX ORDER — NIFEDIPINE 30 MG/1
60 TABLET, EXTENDED RELEASE ORAL DAILY
Status: DISCONTINUED | OUTPATIENT
Start: 2017-02-28 | End: 2017-02-28 | Stop reason: HOSPADM

## 2017-02-27 RX ORDER — TACROLIMUS 1 MG/1
2 CAPSULE ORAL EVERY MORNING
Status: DISCONTINUED | OUTPATIENT
Start: 2017-02-28 | End: 2017-02-28 | Stop reason: HOSPADM

## 2017-02-27 RX ORDER — IBUPROFEN 200 MG
16 TABLET ORAL
Status: DISCONTINUED | OUTPATIENT
Start: 2017-02-27 | End: 2017-02-28 | Stop reason: HOSPADM

## 2017-02-27 RX ORDER — POTASSIUM CHLORIDE 20 MEQ/15ML
20 SOLUTION ORAL
Status: COMPLETED | OUTPATIENT
Start: 2017-02-27 | End: 2017-02-27

## 2017-02-27 RX ADMIN — SODIUM CHLORIDE 1000 ML: 0.9 INJECTION, SOLUTION INTRAVENOUS at 05:02

## 2017-02-27 RX ADMIN — OXYCODONE HYDROCHLORIDE 10 MG: 5 TABLET ORAL at 08:02

## 2017-02-27 RX ADMIN — OXYCODONE HYDROCHLORIDE 10 MG: 5 TABLET ORAL at 04:02

## 2017-02-27 RX ADMIN — INSULIN HUMAN 10 UNITS: 100 INJECTION, SOLUTION PARENTERAL at 05:02

## 2017-02-27 RX ADMIN — SODIUM CHLORIDE 1000 ML: 0.9 INJECTION, SOLUTION INTRAVENOUS at 07:02

## 2017-02-27 RX ADMIN — TACROLIMUS 1 MG: 1 CAPSULE, GELATIN COATED ORAL at 08:02

## 2017-02-27 RX ADMIN — POTASSIUM CHLORIDE 20 MEQ: 20 SOLUTION ORAL at 08:02

## 2017-02-27 NOTE — ED NOTES
Pt states cannot take acetaminophen due to liver transplant. Dr. Mazariegos notified, states she will change order.

## 2017-02-27 NOTE — ED PROVIDER NOTES
"Encounter Date: 2/27/2017    SCRIBE #1 NOTE: I, José Miguel Kessler, am scribing for, and in the presence of, Dr. Mazariegos.       History     Chief Complaint   Patient presents with    Headache     States he feels like this when his sugar is elevated Was 596 at home. C/O rt giulia efeeling weaker, grasp =  , ambulating. C/O dizziness - states this happens whenever his sugar goes up was 596 at home. Dr Mazariegos notifid.     Hyperglycemia     Review of patient's allergies indicates:   Allergen Reactions    Naproxen      Other reaction(s): problems w/liver/kid    Oxaprozin      Other reaction(s): cause problems w/kid/liver     HPI Comments: Time patient was seen by the provider: 4:02 PM      The patient is a 61 y.o.  male with PMHx IDDM and HTN, and SHx liver transplant (6-2010) who presents to the ED with hyperglycemia and a variety of complaints including the following: headache, feet discomfort/burning, urinary frequency and epigastric pain. Patient also reports that he has been experiencing cramping and weakness on his right side, stating that his arms and hands intermittently "lock up". Labs this AM revealed patient's glucose to be 613. Patient notes that he has experienced similar symptoms in the past associated with hyperglycemia, most recently about 1 week ago. Patient has been compliant with his insulin injections, 4x/day, and has not had any substantial changes with his diet. Patient denies any nausea/vomiting, fever/chills, chest pain, or SOB. Also denies any Hx CVA/TIA.        The history is provided by the patient.     Past Medical History:   Diagnosis Date    Anemia     Anxiety     Chronic hepatitis C virus infection - RELAPSE following harvoni + RBV 8/23/2011    Completed 12 weeks Harvoni + RBV w/ RELAPSE 6 weeks out (3/2016)  Wk 1 Hgb 12.4, prograf 6.8 Wk 2 Hgb 12.7 Wk 4 Hgb 12.8, prograf 5.4; HCV RNA 47. INCREASE RBV to 600 Wk 5 Hgb 12.6 Wk 6 Hgb 12.4, HCV RNA <12, detected. INCREASE  " Wk 7 Hgb 11.8 Wk 8 Hgb 11.7, prograf 4.0. INCREASE RBV 1000, INCREASE PROGRAF 2/1 Wk 9 Hgb 12.4, prograf 3.6  HCV RNA <12, detected. INCREASE PROGRAF 2/2 Wk 10 hgb     Chronic pain syndrome 7/13/2011    Diabetes mellitus type II, uncontrolled     Discitis of lumbosacral region 1/16/2015    ED (erectile dysfunction)     Genital herpes     Gout, arthritis     History of alcohol abuse     History of positive PPD, treatment status unknown     Pulmonary granulomas, negative sputum cultures for AFB and indeterminate quantferon test    History of substance abuse     Hypertension     Hypothyroidism     Peptic ulcer disease      Past Surgical History:   Procedure Laterality Date    CHOLECYSTECTOMY      LIVER TRANSPLANT  06/2010    SPINE SURGERY       Family History   Problem Relation Age of Onset    Cancer Mother     Diabetes Mother     Heart disease Mother     Diabetes Sister     Cancer Maternal Uncle 82     colon CA    Melanoma Neg Hx     Psoriasis Neg Hx     Lupus Neg Hx     Eczema Neg Hx     Acne Neg Hx      Social History   Substance Use Topics    Smoking status: Former Smoker    Smokeless tobacco: Never Used    Alcohol use No      Comment: over 5 years ago, none currently     Review of Systems   Constitutional: Negative for chills and fever.   HENT: Negative for nosebleeds.         Dry mouth.   Eyes: Negative for redness.   Respiratory: Negative for shortness of breath.    Cardiovascular: Negative for chest pain.   Gastrointestinal: Positive for abdominal pain (epigastric). Negative for nausea and vomiting.   Genitourinary: Positive for frequency.   Musculoskeletal: Negative for neck pain.        Feet discomfort/burning.   Skin: Negative for wound.   Neurological: Positive for dizziness and headaches. Negative for speech difficulty.       Physical Exam   Initial Vitals   BP Pulse Resp Temp SpO2   02/27/17 1522 02/27/17 1522 02/27/17 1522 02/27/17 1522 02/27/17 1522   113/72 90 18 98.4 °F  (36.9 °C) 96 %     Physical Exam    Nursing note and vitals reviewed.  Constitutional: He appears well-developed and well-nourished. No distress.   HENT:   Head: Normocephalic and atraumatic.   Right Ear: External ear normal.   Left Ear: External ear normal.   Mouth/Throat: Oropharynx is clear and moist.   Eyes: EOM are normal. Pupils are equal, round, and reactive to light.   Neck: Normal range of motion. Neck supple.   Cardiovascular: Normal rate, regular rhythm and normal heart sounds. Exam reveals no gallop and no friction rub.    No murmur heard.  Pulmonary/Chest: Breath sounds normal. No respiratory distress. He has no wheezes. He has no rhonchi. He has no rales.   Abdominal: Soft. He exhibits no distension. There is tenderness (mild epigastric tenderness). There is no rebound and no guarding.   Musculoskeletal: Normal range of motion.   Neurological: He is alert and oriented to person, place, and time. He has normal strength. A sensory deficit (Decreased sensation to light touch to RUE and RLE.) is present. No cranial nerve deficit.   No pronator drift.   Skin: Skin is warm and dry.         ED Course   Procedures  Labs Reviewed   CBC W/ AUTO DIFFERENTIAL - Abnormal; Notable for the following:        Result Value    Platelets 127 (*)     All other components within normal limits    Narrative:     add on Lipase order #[id number] per Dr. Desirae Mazariegos @ 02/27/2017    16:42    COMPREHENSIVE METABOLIC PANEL - Abnormal; Notable for the following:     Sodium 132 (*)     Glucose 404 (*)     BUN, Bld 27 (*)     Creatinine 1.7 (*)     Albumin 3.2 (*)     Alkaline Phosphatase 276 (*)      (*)      (*)     eGFR if  49.9 (*)     eGFR if non  43.2 (*)     All other components within normal limits    Narrative:     add on Lipase order #[id number] per Dr. Desirae Mazariegos @ 02/27/2017    16:42    LIPASE - Abnormal; Notable for the following:     Lipase 152 (*)     All other components  within normal limits    Narrative:     add on Lipase order #[id number] per Dr. Desirae Mazariegos @ 02/27/2017    16:42    POCT GLUCOSE - Abnormal; Notable for the following:     POCT Glucose 376 (*)     All other components within normal limits   POCT GLUCOSE - Abnormal; Notable for the following:     POCT Glucose 125 (*)     All other components within normal limits   ISTAT PROCEDURE - Abnormal; Notable for the following:     POC Potassium 3.3 (*)     All other components within normal limits   PROTIME-INR    Narrative:     add on Lipase order #[id number] per Dr. Desirae Mazariegos @ 02/27/2017    16:42    TSH    Narrative:     add on Lipase order #[id number] per Dr. Desirae Mazariegos @ 02/27/2017    16:42    LIPASE   ISTAT CHEM8             Medical Decision Making:   History:   Old Medical Records: I decided to obtain old medical records.  Old Records Summarized: records from clinic visits.       <> Summary of Records: Labs this AM revealed glucose 613. Patient is 6 years out from liver transplant. Anion gap 12, bicarbonate 19, CBC unremarkable. He was seen yesterday by his Family Medicine doctor who ordered this blood work. This appointment was an ED follow-up visit for hyperglycemia. He takes insulin for his diabetes. His BUN and creatinine were 27 and 1.6 yesterday, which appear slightly increased from baseline.  Initial Assessment:   62 y/o male s/p liver transplant with IDDM presents to the ED with hyperglycemia. Labs reviewed from earlier today showed no evidence of DKA. Given his neurologic symptoms and headache, will order head CT to r/o intracranial bleeding, but suspect neurological symptoms are secondary to hyperglycemia.  Clinical Tests:   Lab Tests: Ordered and Reviewed  Radiological Study: Ordered and Reviewed  ED Management:  Will give IV fluids and .1 units/kg regular insulin IV. Will re-assess.            Scribe Attestation:   Scribe #1: I performed the above scribed service and the documentation accurately describes  the services I performed. I attest to the accuracy of the note.    Attending Attestation:           Physician Attestation for Scribe:  Physician Attestation Statement for Scribe #1: I, Dr. Mazariegos, reviewed documentation, as scribed by José Miguel Kessler in my presence, and it is both accurate and complete.         Attending ED Notes:   9:46 PM  Repeat finger stick after treatment was 125. Head CT showed no acute findings. On re-examination, his neuro exam has improved with normal sensation throughout. He does complain of cramping on right side of body. I had ordered a second liter of normal saline. i-STAT Chem 8+ sent after this which was normal except K 3.3. He was given some oral K. Because this is the patient's second visit to the ED for significant hyperglycemia within 3 days, will place patient in observation for monitoring of his blood glucose and determination of the best outpatient regimen. Discussed case with Dr. Jones who agrees to admit the patient.          ED Course     Clinical Impression:   The encounter diagnosis was Hyperglycemia.          Desirae Rondon MD  02/27/17 [id number]

## 2017-02-27 NOTE — IP AVS SNAPSHOT
[facility]  [address]  [facility] [address]  Phone: [phone]           Patient Discharge Instructions     Our goal is to set you up for success. This packet includes information on your condition, medications, and your home care. It will help you to care for yourself so you don't get sicker and need to go back to the hospital.     Please ask your nurse if you have any questions.        There are many details to remember when preparing to leave the hospital. Here is what you will need to do:    1. Take your medicine. If you are prescribed medications, review your Medication List in the following pages. You may have new medications to  at the pharmacy and others that you'll need to stop taking. Review the instructions for how and when to take your medications. Talk with your doctor or nurses if you are unsure of what to do.     2. Go to your follow-up appointments. Specific follow-up information is listed in the following pages. Your may be contacted by a transition nurse or clinical provider about future appointments. Be sure we have all of the phone numbers to reach you, if needed. Please contact your provider's office if you are unable to make an appointment.     3. Watch for warning signs. Your doctor or nurse will give you detailed warning signs to watch for and when to call for assistance. These instructions may also include educational information about your condition. If you experience any of warning signs to your health, call your doctor.               [facility]  Unless otherwise directed by your provider, please contact [facility], our nurse care line that is available for 24/7 assistance.     [phone] (toll-free)    Registered nurses in the [facility] provide clinical advisement, health education, appointment booking, and other advisory services.                    ** Verify the list of medication(s) below is accurate and up  to date. Carry this with you in case of emergency. If your medications have changed, please notify your healthcare provider.             Medication List      CONTINUE taking these medications        Additional Info    Begin Date AM Noon PM Bedtime    allopurinol 100 MG tablet   Commonly known as:  ZYLOPRIM   Quantity:  60 tablet   Refills:  11   Dose:  100 mg    Instructions:  Take 1 tablet (100 mg total) by mouth 2 (two) times daily.                                  amitriptyline 25 MG tablet   Commonly known as:  ELAVIL   Quantity:  30 tablet   Refills:  11   Dose:  25 mg    Instructions:  Take 1 tablet (25 mg total) by mouth every evening. For nerve pain and sleep                               blood sugar diagnostic Strp   Quantity:  100 strip   Refills:  11   Dose:  1 strip    Instructions:  1 strip by Misc.(Non-Drug; Combo Route) route 4 (four) times daily before meals and nightly.                            blood-glucose meter Misc   Quantity:  1 each   Refills:  0   Dose:  1 each   Comments:  Dispense with appropriate controls and strips    Instructions:  1 each by Misc.(Non-Drug; Combo Route) route 4 (four) times daily before meals and nightly.                            CALTRATE 600 + D 600 mg(1,500mg) -400 unit Chew   Refills:  0   Generic drug:  calcium carbonate-vitamin D3                             gabapentin 600 MG tablet   Commonly known as:  NEURONTIN   Quantity:  120 tablet   Refills:  2   Dose:  600 mg    Instructions:  Take 1 tablet (600 mg total) by mouth 4 (four) times daily.                                        insulin aspart 100 unit/mL Inpn pen   Commonly known as:  NovoLOG   Quantity:  10.8 mL   Refills:  11   Dose:  12 Units    Last time this was given:  16 Units on 2/28/2017 12:44 PM   Instructions:  Inject 12 Units into the skin 3 (three) times daily with meals.                            insulin detemir 100 unit/mL (3 mL) Inpn pen   Commonly known as:  LEVEMIR FLEXTOUCH   Quantity:   "15 mL   Refills:  11   Dose:  55 Units    Last time this was given:  45 Units on 2/28/2017  4:26 AM   Instructions:  Inject 55 Units into the skin every evening.                            lancets Misc   Quantity:  200 each   Refills:  11   Dose:  1 each    Instructions:  1 each by Misc.(Non-Drug; Combo Route) route 4 (four) times daily before meals and nightly.                            lancing device Misc   Quantity:  1 each   Refills:  0   Dose:  1 each    Instructions:  1 each by Misc.(Non-Drug; Combo Route) route 4 (four) times daily before meals and nightly.                            levothyroxine 88 MCG tablet   Commonly known as:  SYNTHROID   Quantity:  30 tablet   Refills:  6   Dose:  88 mcg    Last time this was given:  88 mcg on 2/28/2017  6:08 AM   Instructions:  Take 1 tablet (88 mcg total) by mouth once daily.                               lisinopril 10 MG tablet   Quantity:  90 tablet   Refills:  2   Dose:  10 mg    Last time this was given:  10 mg on 2/28/2017  8:51 AM   Instructions:  Take 1 tablet (10 mg total) by mouth once daily.                               metoprolol tartrate 50 MG tablet   Commonly known as:  LOPRESSOR   Quantity:  180 tablet   Refills:  3    Last time this was given:  50 mg on 2/28/2017  8:51 AM   Instructions:  TAKE 3 TABLETS BY MOUTH 2 (TWO) TIMES DAILY.                                  multivitamin per tablet   Commonly known as:  THERAGRAN   Refills:  0   Dose:  1 tablet    Instructions:  Take 1 tablet by mouth.                               NEXIUM 40 MG capsule   Quantity:  90 capsule   Refills:  3   Generic drug:  esomeprazole    Instructions:  TAKE ONE CAPSULE BY MOUTH EVERY DAY                            nifedipine 60 MG Tbsr   Commonly known as:  ADALAT CC   Quantity:  90 tablet   Refills:  11   Dose:  60 mg    Instructions:  Take 1 tablet (60 mg total) by mouth once daily.                               pen needle, diabetic 32 gauge x 5/32" Ndle   Commonly known " as:  BD ULTRA-FINE MENDY PEN NEEDLES   Quantity:  100 each   Refills:  12    Instructions:  Use with aspart TIDWM and with detemir QHS                            sertraline 100 MG tablet   Commonly known as:  ZOLOFT   Quantity:  30 tablet   Refills:  11   Dose:  100 mg    Last time this was given:  100 mg on 2/28/2017  8:51 AM   Instructions:  Take 1 tablet (100 mg total) by mouth once daily.                               sildenafil 100 MG tablet   Commonly known as:  VIAGRA   Quantity:  30 tablet   Refills:  11   Dose:  100 mg    Instructions:  Take 1 tablet (100 mg total) by mouth daily as needed for Erectile Dysfunction.                            tacrolimus 1 MG Cap   Commonly known as:  PROGRAF   Quantity:  90 capsule   Refills:  11    Last time this was given:  2 mg on 2/28/2017  8:51 AM   Instructions:  Take 2mg in AM and 1mg in PM by mouth Liver Transplant Z94.4                            ZETIA 10 mg tablet   Quantity:  30 tablet   Refills:  7   Generic drug:  ezetimibe    Last time this was given:  10 mg on 2/28/2017  8:51 AM   Instructions:  TAKE 1 TABLET BY MOUTH EVERY DAY                              ASK your doctor about these medications        Additional Info    Begin Date AM Noon PM Bedtime    oxycodone 15 MG Tab   Commonly known as:  ROXICODONE   Quantity:  120 tablet   Refills:  0   Dose:  15 mg   Ask about: Should I take this medication?    Last time this was given:  5 mg on 2/28/2017  8:52 AM   Instructions:  Take 1 tablet (15 mg total) by mouth every 6 (six) hours as needed for Pain.                                       Please bring to all follow up appointments:    1. A copy of your discharge instructions.  2. All medicines you are currently taking in their original bottles.  3. Identification and insurance card.    Please arrive 15 minutes ahead of scheduled appointment time.    Please call 24 hours in advance if you must reschedule your appointment and/or time.        Your Scheduled  "Appointments     Mar 01, 2017 10:20 AM CST   Established Patient Visit with Emma Batista NP   [facility]    [address]  [place] [address] [phone]            Mar 24, 2017 10:40 AM CDT   Urgent Care with Emanuel Lopez MD   [facility]    [address]  [facility] [address] [phone]                  Primary Diagnosis     Your primary diagnosis was:  Type 2 Diabetes Mellitus With Hyperosmolar Nonketotic Hyperglycemia      Admission Information     Date & Time Provider Department CSN    2/27/2017  3:33 PM Clint Rodrigues MD [facility] [id number]      Care Providers     Provider Role Specialty Primary office phone    Clint Rodrigues MD Attending Provider Hospitalist [phone]    Clint Rodrigues MD Team Attending  Hospitalist [phone]    Sheila Gann MD Team Attending  Hospitalist [phone]      Your Vitals Were     BP                   126/86 (BP Location: Left arm, Patient Position: Lying, BP Method: Automatic)           Recent Lab Values        5/30/2010 6/9/2010 7/15/2010 10/25/2010 1/19/2016 9/6/2016 10/4/2016 2/21/2017      4:55 AM 11:55 AM  5:20 AM  5:30 AM 10:30 AM  7:33 AM 12:48 PM 11:36 PM    A1C &lt;4.0 &lt;4.0 6.7 (H) 5.6 5.7 8.3 (H) 13.9 (H) 9.0 (H)    Comment for A1C at  4:55 AM on 5/30/2010:  Hemoglobin A1C:   UNABLE TO CALCULATE "EAGLU" DUE TO HGB A1C AS LESS THAN LINEARITY       Comment for A1C at  7:33 AM on 9/6/2016:  According to ADA guidelines, hemoglobin A1C <7.0% represents  optimal control in non-pregnant diabetic patients.  Different  metrics may apply to specific populations.   Standards of Medical Care in Diabetes - 2016.  For the purpose of screening for the presence of diabetes:  <5.7%     Consistent with the absence of diabetes  5.7-6.4%  Consistent with increasing risk for diabetes   (prediabetes)  >or=6.5%  Consistent with diabetes  Currently no " consensus exists for use of hemoglobin A1C  for diagnosis of diabetes for children.      Comment for A1C at 12:48 PM on 10/4/2016:  According to ADA guidelines, hemoglobin A1C <7.0% represents  optimal control in non-pregnant diabetic patients.  Different  metrics may apply to specific populations.   Standards of Medical Care in Diabetes - 2016.  For the purpose of screening for the presence of diabetes:  <5.7%     Consistent with the absence of diabetes  5.7-6.4%  Consistent with increasing risk for diabetes   (prediabetes)  >or=6.5%  Consistent with diabetes  Currently no consensus exists for use of hemoglobin A1C  for diagnosis of diabetes for children.      Comment for A1C at 11:36 PM on 2/21/2017:  According to ADA guidelines, hemoglobin A1C <7.0% represents  optimal control in non-pregnant diabetic patients.  Different  metrics may apply to specific populations.   Standards of Medical Care in Diabetes - 2016.  For the purpose of screening for the presence of diabetes:  <5.7%     Consistent with the absence of diabetes  5.7-6.4%  Consistent with increasing risk for diabetes   (prediabetes)  >or=6.5%  Consistent with diabetes  Currently no consensus exists for use of hemoglobin A1C  for diagnosis of diabetes for children.        Allergies as of 2/28/2017        Reactions    Naproxen     Other reaction(s): problems w/liver/kid    Oxaprozin     Other reaction(s): cause problems w/kid/liver      Advance Directives     An advance directive is a document which, in the event you are no longer able to make decisions for yourself, tells your healthcare team what kind of treatment you do or do not want to receive, or who you would like to make those decisions for you.  If you do not currently have an advance directive, [facility] encourages you to create one.  For more information call:  [phone] ([phone]), [phone] ([phone]),  or log on to [email or web address].        Smoking Cessation     If you  would like to quit smoking:   You may be eligible for free services if you are a [place] resident and started smoking cigarettes before September 1, 1988.  Call the Smoking Cessation [employer] (SCT) toll free at (817) 772-8529 or (610) 859-4362.   Call 1-506-QUIT-NOW if you do not meet the above criteria.            Language Assistance Services     ATTENTION: Language assistance services are available, free of charge. Please call [phone].      ATENCIÓN: Si habla arianna, tiene a palmer disposición servicios gratuitos de asistencia lingüística. Llame al [phone].     CHÚ Ý: N?u b?n nói Ti?ng Vi?t, có các d?ch v? h? tr? ngôn ng? mi?n phí dành cho b?n. G?i s? [phone].        Chronic Kindey Disease Education             Diabetes Discharge Instructions                                   [facility] Sign-Up     Activating your [facility] account is as easy as 1-2-3!     1) Visit [email or web address], select Sign Up Now, enter this activation code and your date of birth, then select Next.  [email or web address]  Expires: 4/8/2017  2:46 AM      2) Create a username and password to use when you visit [facility] in the future and select a security question in case you lose your password and select Next.    3) Enter your e-mail address and click Sign Up!    Additional Information  If you have questions, please e-mail [email or web address] or call [phone] to talk to our [facility] staff. Remember, [facility] is NOT to be used for urgent needs. For medical emergencies, dial 911.          [facility] complies with applicable Federal civil rights laws and does not discriminate on the basis of race, color, national origin, age, disability, or sex.

## 2017-02-27 NOTE — ED NOTES
Patient moved to ED room 07, patient assisted onto stretcher and changed into a gown. Patient placed on cardiac monitor, continuous pulse oximetry and automatic blood pressure cuff. Bed placed in low locked position, side rails up x 2, call light is within reach of patient, orientation to room and explanation of wait provided to patient, alarms set and turned on for monitor and pulse ox, awaiting MD evaluation and orders, will continue to monitor.

## 2017-02-27 NOTE — TELEPHONE ENCOUNTER
rec critical blood glucose of 613. Called pt's number and left urgent VM explaining that pt needs to go to ER regarding critical bs level. Asked for a return call. Then called pt' wife and explained pt's critical level and that pt needs to go to the er for evaluation and treatment. WIfe's tone appeared unconcerned but she did state she would let pt know.

## 2017-02-27 NOTE — ED TRIAGE NOTES
Patient received with complaint of hyperglycemia and a headache.  Headache started 1030 today.  Also reports bilateral leg pain that increases with movement.  No nausea.  No vomiting.  FSBG at 1200 was 500 at which time he became dizzy.  Reports dry mouth and being thirsty.  Urinary frequency noted recently.     No LDA's in place on arrival to department.    Family present.    Pain:  Rated 10/10 for headache.    Psychosocial:  Patient is calm and cooperative.  Patients insight and judgement are appropriate to situation.  Appears clean, well maintained, with clothing appropriate to environment.  No evidence of delusions, hallucinations, or psychosis.    Neuro:  Eyes open spontaneously.  Awake, alert, oriented x 4.  Speech clear and appropriate.  Tolerating saliva secretions well.  Able to follow commands, demonstrating ability to actively and appropriately communicate within context of current conversation.  Symmetrical facial muscles.  Moving all extremities well with no noted weakness.  Adequate muscle tone present.    Movement is purposeful.  No evidence of impaired sensation.  Responds to external stimuli with appropriate reflexes.  Ambulates from WC to bed safety, without assist.      Airway:  Bilateral chest rise and fall.  RR regular and non-labored.  No crepitus or subcutaneous emphysema noted on palpation.      Circulatory:  Skin warm, dry, and pink.  Capillary refill/skin blanching less than 3 seconds to distal of 4 extremities.    Abdomen:  Abdomen soft and non-distended.        Urinary:  Patient reports routine urination without pain, or urgency.  Voids independently.  Reports urine appears tyrone/yellow in color.  Some odor per patient.    Extremities:  No redness, heat, swelling, deformity, or pain.    Skin:  Intact with no bruising/discolorations noted.

## 2017-02-28 VITALS
HEART RATE: 79 BPM | DIASTOLIC BLOOD PRESSURE: 92 MMHG | TEMPERATURE: 98 F | HEIGHT: 73 IN | SYSTOLIC BLOOD PRESSURE: 143 MMHG | OXYGEN SATURATION: 96 % | BODY MASS INDEX: 36.2 KG/M2 | WEIGHT: 273.13 LBS | RESPIRATION RATE: 18 BRPM

## 2017-02-28 PROBLEM — E11.00 TYPE 2 DIABETES MELLITUS WITH HYPEROSMOLAR NONKETOTIC HYPERGLYCEMIA: Status: ACTIVE | Noted: 2017-02-28

## 2017-02-28 PROBLEM — E86.9 VOLUME DEPLETION: Status: ACTIVE | Noted: 2017-02-28

## 2017-02-28 LAB
ALBUMIN SERPL BCP-MCNC: 2.8 G/DL
ALP SERPL-CCNC: 182 U/L
ALT SERPL W/O P-5'-P-CCNC: 104 U/L
ANION GAP SERPL CALC-SCNC: 5 MMOL/L
ANISOCYTOSIS BLD QL SMEAR: SLIGHT
AST SERPL-CCNC: 147 U/L
BASOPHILS # BLD AUTO: 0 K/UL
BASOPHILS NFR BLD: 0 %
BILIRUB SERPL-MCNC: 0.9 MG/DL
BUN SERPL-MCNC: 23 MG/DL
BURR CELLS BLD QL SMEAR: ABNORMAL
CALCIUM SERPL-MCNC: 7.8 MG/DL
CHLORIDE SERPL-SCNC: 104 MMOL/L
CO2 SERPL-SCNC: 25 MMOL/L
CREAT SERPL-MCNC: 1.4 MG/DL
DIFFERENTIAL METHOD: ABNORMAL
EOSINOPHIL # BLD AUTO: 0.4 K/UL
EOSINOPHIL NFR BLD: 6.9 %
ERYTHROCYTE [DISTWIDTH] IN BLOOD BY AUTOMATED COUNT: 13.7 %
EST. GFR  (AFRICAN AMERICAN): >60 ML/MIN/1.73 M^2
EST. GFR  (NON AFRICAN AMERICAN): 54.6 ML/MIN/1.73 M^2
GLUCOSE SERPL-MCNC: 206 MG/DL
HCT VFR BLD AUTO: 38.9 %
HGB BLD-MCNC: 12.8 G/DL
LIPASE SERPL-CCNC: 249 U/L
LYMPHOCYTES # BLD AUTO: 2.3 K/UL
LYMPHOCYTES NFR BLD: 39.7 %
MAGNESIUM SERPL-MCNC: 1 MG/DL
MCH RBC QN AUTO: 28.2 PG
MCHC RBC AUTO-ENTMCNC: 32.9 %
MCV RBC AUTO: 86 FL
MONOCYTES # BLD AUTO: 0.5 K/UL
MONOCYTES NFR BLD: 8.6 %
NEUTROPHILS # BLD AUTO: 2.5 K/UL
NEUTROPHILS NFR BLD: 44.8 %
OVALOCYTES BLD QL SMEAR: ABNORMAL
PHOSPHATE SERPL-MCNC: 2.9 MG/DL
PLATELET # BLD AUTO: 129 K/UL
PLATELET BLD QL SMEAR: ABNORMAL
PMV BLD AUTO: ABNORMAL FL
POCT GLUCOSE: 142 MG/DL (ref 70–110)
POCT GLUCOSE: 189 MG/DL (ref 70–110)
POCT GLUCOSE: 203 MG/DL (ref 70–110)
POCT GLUCOSE: 223 MG/DL (ref 70–110)
POIKILOCYTOSIS BLD QL SMEAR: SLIGHT
POTASSIUM SERPL-SCNC: 3.9 MMOL/L
PROT SERPL-MCNC: 6.1 G/DL
RBC # BLD AUTO: 4.54 M/UL
SODIUM SERPL-SCNC: 134 MMOL/L
TACROLIMUS BLD-MCNC: 9.8 NG/ML
WBC # BLD AUTO: 5.69 K/UL

## 2017-02-28 PROCEDURE — 63600175 PHARM REV CODE 636 W HCPCS: Performed by: HOSPITALIST

## 2017-02-28 PROCEDURE — G0378 HOSPITAL OBSERVATION PER HR: HCPCS

## 2017-02-28 PROCEDURE — 80197 ASSAY OF TACROLIMUS: CPT

## 2017-02-28 PROCEDURE — 25000003 PHARM REV CODE 250: Performed by: HOSPITALIST

## 2017-02-28 PROCEDURE — 85025 COMPLETE CBC W/AUTO DIFF WBC: CPT

## 2017-02-28 PROCEDURE — 36415 COLL VENOUS BLD VENIPUNCTURE: CPT

## 2017-02-28 PROCEDURE — 99220 PR INITIAL OBSERVATION CARE,LEVL III: CPT | Mod: ,,, | Performed by: HOSPITALIST

## 2017-02-28 PROCEDURE — 84100 ASSAY OF PHOSPHORUS: CPT

## 2017-02-28 PROCEDURE — 83690 ASSAY OF LIPASE: CPT

## 2017-02-28 PROCEDURE — 83735 ASSAY OF MAGNESIUM: CPT

## 2017-02-28 PROCEDURE — 25000003 PHARM REV CODE 250: Performed by: EMERGENCY MEDICINE

## 2017-02-28 PROCEDURE — 80053 COMPREHEN METABOLIC PANEL: CPT

## 2017-02-28 PROCEDURE — 63600175 PHARM REV CODE 636 W HCPCS: Performed by: EMERGENCY MEDICINE

## 2017-02-28 RX ORDER — GABAPENTIN 400 MG/1
400 CAPSULE ORAL 3 TIMES DAILY
Status: DISCONTINUED | OUTPATIENT
Start: 2017-02-28 | End: 2017-02-28 | Stop reason: HOSPADM

## 2017-02-28 RX ORDER — GLUCAGON 1 MG
1 KIT INJECTION
Status: DISCONTINUED | OUTPATIENT
Start: 2017-02-28 | End: 2017-02-28 | Stop reason: HOSPADM

## 2017-02-28 RX ORDER — LISINOPRIL 10 MG/1
10 TABLET ORAL DAILY
Status: DISCONTINUED | OUTPATIENT
Start: 2017-02-28 | End: 2017-02-28 | Stop reason: HOSPADM

## 2017-02-28 RX ORDER — ACETAMINOPHEN 325 MG/1
650 TABLET ORAL EVERY 6 HOURS PRN
Status: DISCONTINUED | OUTPATIENT
Start: 2017-02-28 | End: 2017-02-28 | Stop reason: HOSPADM

## 2017-02-28 RX ORDER — INSULIN ASPART 100 [IU]/ML
12 INJECTION, SOLUTION INTRAVENOUS; SUBCUTANEOUS
Status: DISCONTINUED | OUTPATIENT
Start: 2017-02-28 | End: 2017-02-28 | Stop reason: HOSPADM

## 2017-02-28 RX ORDER — ONDANSETRON 2 MG/ML
4 INJECTION INTRAMUSCULAR; INTRAVENOUS EVERY 8 HOURS PRN
Status: DISCONTINUED | OUTPATIENT
Start: 2017-02-28 | End: 2017-02-28 | Stop reason: HOSPADM

## 2017-02-28 RX ORDER — EZETIMIBE 10 MG/1
10 TABLET ORAL DAILY
Status: DISCONTINUED | OUTPATIENT
Start: 2017-02-28 | End: 2017-02-28 | Stop reason: HOSPADM

## 2017-02-28 RX ORDER — IPRATROPIUM BROMIDE AND ALBUTEROL SULFATE 2.5; .5 MG/3ML; MG/3ML
3 SOLUTION RESPIRATORY (INHALATION) EVERY 4 HOURS PRN
Status: DISCONTINUED | OUTPATIENT
Start: 2017-02-28 | End: 2017-02-28 | Stop reason: HOSPADM

## 2017-02-28 RX ORDER — AMOXICILLIN 250 MG
2 CAPSULE ORAL 2 TIMES DAILY PRN
Status: DISCONTINUED | OUTPATIENT
Start: 2017-02-28 | End: 2017-02-28 | Stop reason: HOSPADM

## 2017-02-28 RX ORDER — TACROLIMUS 1 MG/1
1 CAPSULE ORAL EVERY MORNING
Status: DISCONTINUED | OUTPATIENT
Start: 2017-02-28 | End: 2017-02-28 | Stop reason: HOSPADM

## 2017-02-28 RX ORDER — OXYCODONE HYDROCHLORIDE 5 MG/1
5 TABLET ORAL EVERY 6 HOURS PRN
Status: DISCONTINUED | OUTPATIENT
Start: 2017-02-28 | End: 2017-02-28 | Stop reason: HOSPADM

## 2017-02-28 RX ORDER — IBUPROFEN 200 MG
24 TABLET ORAL
Status: DISCONTINUED | OUTPATIENT
Start: 2017-02-28 | End: 2017-02-28 | Stop reason: HOSPADM

## 2017-02-28 RX ORDER — IBUPROFEN 200 MG
16 TABLET ORAL
Status: DISCONTINUED | OUTPATIENT
Start: 2017-02-28 | End: 2017-02-28 | Stop reason: HOSPADM

## 2017-02-28 RX ORDER — INSULIN ASPART 100 [IU]/ML
15 INJECTION, SOLUTION INTRAVENOUS; SUBCUTANEOUS
Status: DISCONTINUED | OUTPATIENT
Start: 2017-02-28 | End: 2017-02-28

## 2017-02-28 RX ORDER — ENOXAPARIN SODIUM 100 MG/ML
40 INJECTION SUBCUTANEOUS EVERY 24 HOURS
Status: DISCONTINUED | OUTPATIENT
Start: 2017-02-28 | End: 2017-02-28 | Stop reason: HOSPADM

## 2017-02-28 RX ADMIN — GABAPENTIN 400 MG: 400 CAPSULE ORAL at 04:02

## 2017-02-28 RX ADMIN — ENOXAPARIN SODIUM 40 MG: 100 INJECTION SUBCUTANEOUS at 11:02

## 2017-02-28 RX ADMIN — SERTRALINE HYDROCHLORIDE 100 MG: 50 TABLET ORAL at 08:02

## 2017-02-28 RX ADMIN — INSULIN ASPART 4 UNITS: 100 INJECTION, SOLUTION INTRAVENOUS; SUBCUTANEOUS at 12:02

## 2017-02-28 RX ADMIN — EZETIMIBE 10 MG: 10 TABLET ORAL at 08:02

## 2017-02-28 RX ADMIN — PANTOPRAZOLE SODIUM 40 MG: 40 TABLET, DELAYED RELEASE ORAL at 08:02

## 2017-02-28 RX ADMIN — INSULIN ASPART 12 UNITS: 100 INJECTION, SOLUTION INTRAVENOUS; SUBCUTANEOUS at 08:02

## 2017-02-28 RX ADMIN — NIFEDIPINE 60 MG: 30 TABLET, FILM COATED, EXTENDED RELEASE ORAL at 08:02

## 2017-02-28 RX ADMIN — LEVOTHYROXINE SODIUM 88 MCG: 88 TABLET ORAL at 06:02

## 2017-02-28 RX ADMIN — INSULIN DETEMIR 45 UNITS: 100 INJECTION, SOLUTION SUBCUTANEOUS at 04:02

## 2017-02-28 RX ADMIN — LISINOPRIL 10 MG: 10 TABLET ORAL at 08:02

## 2017-02-28 RX ADMIN — OXYCODONE HYDROCHLORIDE 15 MG: 5 TABLET ORAL at 12:02

## 2017-02-28 RX ADMIN — METOPROLOL TARTRATE 50 MG: 25 TABLET ORAL at 08:02

## 2017-02-28 RX ADMIN — OXYCODONE HYDROCHLORIDE 5 MG: 5 TABLET ORAL at 08:02

## 2017-02-28 RX ADMIN — SODIUM CHLORIDE 1000 ML: 0.9 INJECTION, SOLUTION INTRAVENOUS at 04:02

## 2017-02-28 RX ADMIN — GABAPENTIN 400 MG: 400 CAPSULE ORAL at 02:02

## 2017-02-28 RX ADMIN — INSULIN ASPART 12 UNITS: 100 INJECTION, SOLUTION INTRAVENOUS; SUBCUTANEOUS at 12:02

## 2017-02-28 RX ADMIN — TACROLIMUS 2 MG: 1 CAPSULE, GELATIN COATED ORAL at 08:02

## 2017-02-28 NOTE — UM SECONDARY REVIEW
Physician Advisor External - Case referred to EHR via email from GOVIND Hampton RN    Level of Care Issue

## 2017-02-28 NOTE — SUBJECTIVE & OBJECTIVE
Past Medical History:   Diagnosis Date    Anemia     Anxiety     Chronic hepatitis C virus infection - RELAPSE following harvoni + RBV 8/23/2011    Completed 12 weeks Harvoni + RBV w/ RELAPSE 6 weeks out (3/2016)  Wk 1 Hgb 12.4, prograf 6.8 Wk 2 Hgb 12.7 Wk 4 Hgb 12.8, prograf 5.4; HCV RNA 47. INCREASE RBV to 600 Wk 5 Hgb 12.6 Wk 6 Hgb 12.4, HCV RNA <12, detected. INCREASE  Wk 7 Hgb 11.8 Wk 8 Hgb 11.7, prograf 4.0. INCREASE RBV 1000, INCREASE PROGRAF 2/1 Wk 9 Hgb 12.4, prograf 3.6  HCV RNA <12, detected. INCREASE PROGRAF 2/2 Wk 10 hgb     Chronic pain syndrome 7/13/2011    Diabetes mellitus type II, uncontrolled     Discitis of lumbosacral region 1/16/2015    ED (erectile dysfunction)     Genital herpes     Gout, arthritis     History of alcohol abuse     History of positive PPD, treatment status unknown     Pulmonary granulomas, negative sputum cultures for AFB and indeterminate quantferon test    History of substance abuse     Hypertension     Hypothyroidism     Peptic ulcer disease        Past Surgical History:   Procedure Laterality Date    CHOLECYSTECTOMY      LIVER TRANSPLANT  06/2010    SPINE SURGERY         Review of patient's allergies indicates:   Allergen Reactions    Naproxen      Other reaction(s): problems w/liver/kid    Oxaprozin      Other reaction(s): cause problems w/kid/liver       No current facility-administered medications on file prior to encounter.      Current Outpatient Prescriptions on File Prior to Encounter   Medication Sig    allopurinol (ZYLOPRIM) 100 MG tablet Take 1 tablet (100 mg total) by mouth 2 (two) times daily.    amitriptyline (ELAVIL) 25 MG tablet Take 1 tablet (25 mg total) by mouth every evening. For nerve pain and sleep    gabapentin (NEURONTIN) 600 MG tablet Take 1 tablet (600 mg total) by mouth 4 (four) times daily.    insulin aspart (NOVOLOG) 100 unit/mL InPn pen Inject 12 Units into the skin 3 (three) times daily with meals.    insulin  "detemir (LEVEMIR FLEXTOUCH) 100 unit/mL (3 mL) SubQ InPn pen Inject 55 Units into the skin every evening.    levothyroxine (SYNTHROID) 88 MCG tablet Take 1 tablet (88 mcg total) by mouth once daily.    lisinopril 10 MG tablet Take 1 tablet (10 mg total) by mouth once daily.    metoprolol tartrate (LOPRESSOR) 50 MG tablet TAKE 3 TABLETS BY MOUTH 2 (TWO) TIMES DAILY.    multivitamin (THERAGRAN) per tablet Take 1 tablet by mouth.    NEXIUM 40 mg capsule TAKE ONE CAPSULE BY MOUTH EVERY DAY    nifedipine (ADALAT CC) 60 MG TbSR Take 1 tablet (60 mg total) by mouth once daily.    [] oxycodone (ROXICODONE) 15 MG Tab Take 1 tablet (15 mg total) by mouth every 6 (six) hours as needed for Pain.    tacrolimus (PROGRAF) 1 MG Cap Take 2mg in AM and 1mg in PM by mouth Liver Transplant Z94.4    ZETIA 10 mg tablet TAKE 1 TABLET BY MOUTH EVERY DAY    blood sugar diagnostic Strp 1 strip by Misc.(Non-Drug; Combo Route) route 4 (four) times daily before meals and nightly.    blood-glucose meter Misc 1 each by Misc.(Non-Drug; Combo Route) route 4 (four) times daily before meals and nightly.    calcium carbonate-vitamin D3 (CALTRATE 600 + D) 600 mg(1,500mg) -400 unit Chew     lancets Misc 1 each by Misc.(Non-Drug; Combo Route) route 4 (four) times daily before meals and nightly.    lancing device Misc 1 each by Misc.(Non-Drug; Combo Route) route 4 (four) times daily before meals and nightly.    pen needle, diabetic (BD ULTRA-FINE MENDY PEN NEEDLES) 32 gauge x 5/32" Ndle Use with aspart TIDWM and with detemir QHS    sertraline (ZOLOFT) 100 MG tablet Take 1 tablet (100 mg total) by mouth once daily.    sildenafil (VIAGRA) 100 MG tablet Take 1 tablet (100 mg total) by mouth daily as needed for Erectile Dysfunction.     Family History     Problem Relation (Age of Onset)    Cancer Mother, Maternal Uncle (82)    Diabetes Mother, Sister    Heart disease Mother        Social History Main Topics    Smoking status: Former " Smoker    Smokeless tobacco: Never Used    Alcohol use No      Comment: over 5 years ago, none currently    Drug use: No    Sexual activity: Not on file     Review of Systems   Constitutional: Negative for activity change, appetite change, chills, diaphoresis, fatigue, fever and unexpected weight change.   HENT: Negative for congestion, dental problem, drooling, ear discharge, ear pain, facial swelling, hearing loss, mouth sores, nosebleeds, postnasal drip, rhinorrhea, sinus pressure, sneezing, sore throat, tinnitus, trouble swallowing and voice change.    Eyes: Negative for photophobia, pain, discharge, redness, itching and visual disturbance.   Respiratory: Negative for apnea, cough, choking, chest tightness, shortness of breath, wheezing and stridor.    Cardiovascular: Negative for chest pain, palpitations and leg swelling.   Gastrointestinal: Negative for abdominal distention, abdominal pain, anal bleeding, blood in stool, constipation, diarrhea, nausea, rectal pain and vomiting.   Endocrine: Positive for polydipsia and polyuria. Negative for cold intolerance, heat intolerance and polyphagia.   Genitourinary: Negative for decreased urine volume, difficulty urinating, discharge, dysuria, enuresis, flank pain, frequency, genital sores, hematuria, penile pain, penile swelling, scrotal swelling, testicular pain and urgency.   Musculoskeletal: Negative for arthralgias, back pain, gait problem, joint swelling, myalgias, neck pain and neck stiffness.   Skin: Negative for color change, pallor, rash and wound.   Allergic/Immunologic: Negative for environmental allergies, food allergies and immunocompromised state.   Neurological: Negative for dizziness, tremors, seizures, syncope, facial asymmetry, speech difficulty, weakness, light-headedness, numbness and headaches.   Hematological: Negative for adenopathy. Does not bruise/bleed easily.   Psychiatric/Behavioral: Negative for agitation, behavioral problems,  confusion, decreased concentration, dysphoric mood, hallucinations, self-injury, sleep disturbance and suicidal ideas. The patient is not nervous/anxious and is not hyperactive.      Objective:     Vital Signs (Most Recent):  Temp: 98.1 °F (36.7 °C) (02/28/17 0325)  Pulse: 83 (02/28/17 0325)  Resp: 18 (02/28/17 0325)  BP: (!) 150/100 (02/28/17 0325)  SpO2: 95 % (02/28/17 0325) Vital Signs (24h Range):  Temp:  [98.1 °F (36.7 °C)-98.4 °F (36.9 °C)] 98.1 °F (36.7 °C)  Pulse:  [77-90] 83  Resp:  [17-19] 18  SpO2:  [94 %-100 %] 95 %  BP: (113-168)/() 150/100     Weight: 123.9 kg (273 lb 2.4 oz)  Body mass index is 36.04 kg/(m^2).    Physical Exam   Constitutional: He is oriented to person, place, and time. He appears well-developed and well-nourished. No distress.   HENT:   Head: Normocephalic and atraumatic.   Right Ear: External ear normal.   Left Ear: External ear normal.   Nose: Nose normal.   Mouth/Throat: Oropharynx is clear and moist. No oropharyngeal exudate.   Eyes: Conjunctivae and EOM are normal. Pupils are equal, round, and reactive to light. Right eye exhibits no discharge. Left eye exhibits no discharge. No scleral icterus.   Neck: Normal range of motion. Neck supple. No JVD present. No tracheal deviation present. No thyromegaly present.   Cardiovascular: Normal rate, regular rhythm, normal heart sounds and intact distal pulses.  Exam reveals no gallop and no friction rub.    No murmur heard.  Pulmonary/Chest: Effort normal and breath sounds normal. No stridor. No respiratory distress. He has no wheezes. He has no rales. He exhibits no tenderness.   Abdominal: Soft. Bowel sounds are normal. He exhibits no distension and no mass. There is no tenderness. No hernia.   Healed old surgical scar from liver transplant    Musculoskeletal: Normal range of motion. He exhibits no edema, tenderness or deformity.   Lymphadenopathy:     He has no cervical adenopathy.   Neurological: He is alert and oriented to  person, place, and time. He has normal reflexes. He displays normal reflexes. No cranial nerve deficit. He exhibits normal muscle tone. Coordination normal.   Skin: Skin is warm and dry. No rash noted. He is not diaphoretic. No erythema. No pallor.   Psychiatric: He has a normal mood and affect. His behavior is normal. Judgment and thought content normal.        Significant Labs:   Admission on 02/27/2017   Component Date Value Ref Range Status    POCT Glucose 02/27/2017 376* 70 - 110 mg/dL Final    WBC 02/27/2017 6.17  3.90 - 12.70 K/uL Final    RBC 02/27/2017 4.84  4.60 - 6.20 M/uL Final    Hemoglobin 02/27/2017 14.1  14.0 - 18.0 g/dL Final    Hematocrit 02/27/2017 41.5  40.0 - 54.0 % Final    MCV 02/27/2017 86  82 - 98 fL Final    MCH 02/27/2017 29.1  27.0 - 31.0 pg Final    MCHC 02/27/2017 34.0  32.0 - 36.0 % Final    RDW 02/27/2017 13.5  11.5 - 14.5 % Final    Platelets 02/27/2017 127* 150 - 350 K/uL Final    MPV 02/27/2017 SEE COMMENT  9.2 - 12.9 fL Final    Gran # 02/27/2017 3.0  1.8 - 7.7 K/uL Final    Lymph # 02/27/2017 2.4  1.0 - 4.8 K/uL Final    Mono # 02/27/2017 0.5  0.3 - 1.0 K/uL Final    Eos # 02/27/2017 0.3  0.0 - 0.5 K/uL Final    Baso # 02/27/2017 0.01  0.00 - 0.20 K/uL Final    Gran% 02/27/2017 48.9  38.0 - 73.0 % Final    Lymph% 02/27/2017 38.6  18.0 - 48.0 % Final    Mono% 02/27/2017 7.8  4.0 - 15.0 % Final    Eosinophil% 02/27/2017 4.5  0.0 - 8.0 % Final    Basophil% 02/27/2017 0.2  0.0 - 1.9 % Final    Aniso 02/27/2017 Slight   Final    Poik 02/27/2017 Slight   Final    Poly 02/27/2017 Occasional   Final    Hypo 02/27/2017 Occasional   Final    Ovalocytes 02/27/2017 Occasional   Final    Differential Method 02/27/2017 Automated   Final    Sodium 02/27/2017 132* 136 - 145 mmol/L Final    Potassium 02/27/2017 3.9  3.5 - 5.1 mmol/L Final    Chloride 02/27/2017 99  95 - 110 mmol/L Final    CO2 02/27/2017 23  23 - 29 mmol/L Final    Glucose 02/27/2017 404* 70 - 110  mg/dL Final    BUN, Bld 02/27/2017 27* 6 - 20 mg/dL Final    Creatinine 02/27/2017 1.7* 0.5 - 1.4 mg/dL Final    Calcium 02/27/2017 9.1  8.7 - 10.5 mg/dL Final    Total Protein 02/27/2017 7.3  6.0 - 8.4 g/dL Final    Albumin 02/27/2017 3.2* 3.5 - 5.2 g/dL Final    Total Bilirubin 02/27/2017 0.8  0.1 - 1.0 mg/dL Final    Alkaline Phosphatase 02/27/2017 276* 55 - 135 U/L Final    AST 02/27/2017 146* 10 - 40 U/L Final    ALT 02/27/2017 119* 10 - 44 U/L Final    Anion Gap 02/27/2017 10  8 - 16 mmol/L Final    eGFR if  02/27/2017 49.9* >60 mL/min/1.73 m^2 Final    eGFR if non  02/27/2017 43.2* >60 mL/min/1.73 m^2 Final    Prothrombin Time 02/27/2017 10.9  9.0 - 12.5 sec Final    INR 02/27/2017 1.0  0.8 - 1.2 Final    TSH 02/27/2017 3.190  0.400 - 4.000 uIU/mL Final    Lipase 02/27/2017 152* 4 - 60 U/L Final    POCT Glucose 02/27/2017 125* 70 - 110 mg/dL Final    POC Glucose 02/27/2017 98  70 - 110 mg/dL Final    POC BUN 02/27/2017 24  6 - 30 mg/dL Final    POC Creatinine 02/27/2017 1.2  0.5 - 1.4 mg/dL Final    POC Sodium 02/27/2017 139  136 - 145 mmol/L Final    POC Potassium 02/27/2017 3.3* 3.5 - 5.1 mmol/L Final    POC Chloride 02/27/2017 102  95 - 110 mmol/L Final    POC TCO2 (MEASURED) 02/27/2017 24  23 - 29 mmol/L Final    POC Ionized Calcium 02/27/2017 1.12  1.06 - 1.42 mmol/L Final    POC Hematocrit 02/27/2017 39  36 - 54 %PCV Final    Sample 02/27/2017 DAINA   Final    POCT Glucose 02/28/2017 189* 70 - 110 mg/dL Final    POCT Glucose 02/28/2017 223* 70 - 110 mg/dL Final         Significant Imaging: I have reviewed and interpreted all pertinent imaging results/findings within the past 24 hours.

## 2017-02-28 NOTE — ASSESSMENT & PLAN NOTE
- Creatinine 1.7 which is slightly higher than baseline of 1.5  - Expect to improve with IVF   - F/U renal panel in morning

## 2017-02-28 NOTE — ASSESSMENT & PLAN NOTE
- No sings of acute liver decompensation   - Continue prograf 2 mg in am and 1 mg in pm   - F/U with hepatology clinic as outpatient

## 2017-02-28 NOTE — NURSING
Patients BP running in the 150's both by manual and automatic reading, ; Patient however asymptomatic. MD notified, no orders at this time.

## 2017-02-28 NOTE — H&P
[facility] Medicine  History & Physical    Patient Name: Vick Gonzalez  MRN: [id number]  Admission Date: 2/27/2017  Attending Physician: Stephanie Davis MD   Primary Care Provider: Emanuel Lopez MD    [facility] Medicine Team: [facility] MED T Sharonda Miller DO     Patient information was obtained from patient and ER records.     Subjective:     Principal Problem:Type 2 diabetes mellitus with hyperosmolar nonketotic hyperglycemia    Chief Complaint:   Chief Complaint   Patient presents with    Headache     States he feels like this when his sugar is elevated Was 596 at home. C/O rt giulia efeeling weaker, grasp =  , ambulating. C/O dizziness - states this happens whenever his sugar goes up was 596 at home. Dr Mazariegos notifid.     Hyperglycemia        HPI: 61 M s/p liver transplant in 2009 for alcoholic cirrhosis and currently on prograf, HTN, uncontrolled IDDM2 ( A1C 9.0 )  c/b diabetic neuropathy, CKDII ( baseline creatinine 1.5 ), chronic low back pain presents to ED with elevated blood glucose of 613. Patient had routine labs done yesterday by hepatology clinic and he was called by transplant coordinator later to come to ED for evaluation of high blood sugar. Endorses polyuria, polydipsia and mild dizziness for last one week upon asking. Notes he checks CBG regularly at home and it was in low 300 this morning. States he is compliant with his home insulin regimen of levemir 45 U nightly and novolog 18 U TIDWM. Denies fever, chills, dietary indiscretion, chest pain, SOB, N/V/D, abdominal pain, urinary symptoms.  Denies tobacco, alcohol, illicit drug use. Compliant with his prograf bid.     His repeat glucose 404 in ED and received 2 L NS bolus, 10 U regular insulin IVP X 1. Recent .       Past Medical History:   Diagnosis Date    Anemia     Anxiety     Chronic hepatitis C virus infection - RELAPSE following harvoni + RBV 8/23/2011    Completed 12 weeks Harvoni + RBV w/ RELAPSE 6  weeks out (3/2016)  Wk 1 Hgb 12.4, prograf 6.8 Wk 2 Hgb 12.7 Wk 4 Hgb 12.8, prograf 5.4; HCV RNA 47. INCREASE RBV to 600 Wk 5 Hgb 12.6 Wk 6 Hgb 12.4, HCV RNA <12, detected. INCREASE  Wk 7 Hgb 11.8 Wk 8 Hgb 11.7, prograf 4.0. INCREASE RBV 1000, INCREASE PROGRAF 2/1 Wk 9 Hgb 12.4, prograf 3.6  HCV RNA <12, detected. INCREASE PROGRAF 2/2 Wk 10 hgb     Chronic pain syndrome 7/13/2011    Diabetes mellitus type II, uncontrolled     Discitis of lumbosacral region 1/16/2015    ED (erectile dysfunction)     Genital herpes     Gout, arthritis     History of alcohol abuse     History of positive PPD, treatment status unknown     Pulmonary granulomas, negative sputum cultures for AFB and indeterminate quantferon test    History of substance abuse     Hypertension     Hypothyroidism     Peptic ulcer disease        Past Surgical History:   Procedure Laterality Date    CHOLECYSTECTOMY      LIVER TRANSPLANT  06/2010    SPINE SURGERY         Review of patient's allergies indicates:   Allergen Reactions    Naproxen      Other reaction(s): problems w/liver/kid    Oxaprozin      Other reaction(s): cause problems w/kid/liver       No current facility-administered medications on file prior to encounter.      Current Outpatient Prescriptions on File Prior to Encounter   Medication Sig    allopurinol (ZYLOPRIM) 100 MG tablet Take 1 tablet (100 mg total) by mouth 2 (two) times daily.    amitriptyline (ELAVIL) 25 MG tablet Take 1 tablet (25 mg total) by mouth every evening. For nerve pain and sleep    gabapentin (NEURONTIN) 600 MG tablet Take 1 tablet (600 mg total) by mouth 4 (four) times daily.    insulin aspart (NOVOLOG) 100 unit/mL InPn pen Inject 12 Units into the skin 3 (three) times daily with meals.    insulin detemir (LEVEMIR FLEXTOUCH) 100 unit/mL (3 mL) SubQ InPn pen Inject 55 Units into the skin every evening.    levothyroxine (SYNTHROID) 88 MCG tablet Take 1 tablet (88 mcg total) by mouth once  "daily.    lisinopril 10 MG tablet Take 1 tablet (10 mg total) by mouth once daily.    metoprolol tartrate (LOPRESSOR) 50 MG tablet TAKE 3 TABLETS BY MOUTH 2 (TWO) TIMES DAILY.    multivitamin (THERAGRAN) per tablet Take 1 tablet by mouth.    NEXIUM 40 mg capsule TAKE ONE CAPSULE BY MOUTH EVERY DAY    nifedipine (ADALAT CC) 60 MG TbSR Take 1 tablet (60 mg total) by mouth once daily.    [] oxycodone (ROXICODONE) 15 MG Tab Take 1 tablet (15 mg total) by mouth every 6 (six) hours as needed for Pain.    tacrolimus (PROGRAF) 1 MG Cap Take 2mg in AM and 1mg in PM by mouth Liver Transplant Z94.4    ZETIA 10 mg tablet TAKE 1 TABLET BY MOUTH EVERY DAY    blood sugar diagnostic Strp 1 strip by Misc.(Non-Drug; Combo Route) route 4 (four) times daily before meals and nightly.    blood-glucose meter Misc 1 each by Misc.(Non-Drug; Combo Route) route 4 (four) times daily before meals and nightly.    calcium carbonate-vitamin D3 (CALTRATE 600 + D) 600 mg(1,500mg) -400 unit Chew     lancets Misc 1 each by Misc.(Non-Drug; Combo Route) route 4 (four) times daily before meals and nightly.    lancing device Misc 1 each by Misc.(Non-Drug; Combo Route) route 4 (four) times daily before meals and nightly.    pen needle, diabetic (BD ULTRA-FINE MENDY PEN NEEDLES) 32 gauge x 5/32" Ndle Use with aspart TIDWM and with detemir QHS    sertraline (ZOLOFT) 100 MG tablet Take 1 tablet (100 mg total) by mouth once daily.    sildenafil (VIAGRA) 100 MG tablet Take 1 tablet (100 mg total) by mouth daily as needed for Erectile Dysfunction.     Family History     Problem Relation (Age of Onset)    Cancer Mother, Maternal Uncle (82)    Diabetes Mother, Sister    Heart disease Mother        Social History Main Topics    Smoking status: Former Smoker    Smokeless tobacco: Never Used    Alcohol use No      Comment: over 5 years ago, none currently    Drug use: No    Sexual activity: Not on file     Review of Systems "   Constitutional: Negative for activity change, appetite change, chills, diaphoresis, fatigue, fever and unexpected weight change.   HENT: Negative for congestion, dental problem, drooling, ear discharge, ear pain, facial swelling, hearing loss, mouth sores, nosebleeds, postnasal drip, rhinorrhea, sinus pressure, sneezing, sore throat, tinnitus, trouble swallowing and voice change.    Eyes: Negative for photophobia, pain, discharge, redness, itching and visual disturbance.   Respiratory: Negative for apnea, cough, choking, chest tightness, shortness of breath, wheezing and stridor.    Cardiovascular: Negative for chest pain, palpitations and leg swelling.   Gastrointestinal: Negative for abdominal distention, abdominal pain, anal bleeding, blood in stool, constipation, diarrhea, nausea, rectal pain and vomiting.   Endocrine: Positive for polydipsia and polyuria. Negative for cold intolerance, heat intolerance and polyphagia.   Genitourinary: Negative for decreased urine volume, difficulty urinating, discharge, dysuria, enuresis, flank pain, frequency, genital sores, hematuria, penile pain, penile swelling, scrotal swelling, testicular pain and urgency.   Musculoskeletal: Negative for arthralgias, back pain, gait problem, joint swelling, myalgias, neck pain and neck stiffness.   Skin: Negative for color change, pallor, rash and wound.   Allergic/Immunologic: Negative for environmental allergies, food allergies and immunocompromised state.   Neurological: Negative for dizziness, tremors, seizures, syncope, facial asymmetry, speech difficulty, weakness, light-headedness, numbness and headaches.   Hematological: Negative for adenopathy. Does not bruise/bleed easily.   Psychiatric/Behavioral: Negative for agitation, behavioral problems, confusion, decreased concentration, dysphoric mood, hallucinations, self-injury, sleep disturbance and suicidal ideas. The patient is not nervous/anxious and is not hyperactive.       Objective:     Vital Signs (Most Recent):  Temp: 98.1 °F (36.7 °C) (02/28/17 0325)  Pulse: 83 (02/28/17 0325)  Resp: 18 (02/28/17 0325)  BP: (!) 150/100 (02/28/17 0325)  SpO2: 95 % (02/28/17 0325) Vital Signs (24h Range):  Temp:  [98.1 °F (36.7 °C)-98.4 °F (36.9 °C)] 98.1 °F (36.7 °C)  Pulse:  [77-90] 83  Resp:  [17-19] 18  SpO2:  [94 %-100 %] 95 %  BP: (113-168)/() 150/100     Weight: 123.9 kg (273 lb 2.4 oz)  Body mass index is 36.04 kg/(m^2).    Physical Exam   Constitutional: He is oriented to person, place, and time. He appears well-developed and well-nourished. No distress.   HENT:   Head: Normocephalic and atraumatic.   Right Ear: External ear normal.   Left Ear: External ear normal.   Nose: Nose normal.   Mouth/Throat: Oropharynx is clear and moist. No oropharyngeal exudate.   Eyes: Conjunctivae and EOM are normal. Pupils are equal, round, and reactive to light. Right eye exhibits no discharge. Left eye exhibits no discharge. No scleral icterus.   Neck: Normal range of motion. Neck supple. No JVD present. No tracheal deviation present. No thyromegaly present.   Cardiovascular: Normal rate, regular rhythm, normal heart sounds and intact distal pulses.  Exam reveals no gallop and no friction rub.    No murmur heard.  Pulmonary/Chest: Effort normal and breath sounds normal. No stridor. No respiratory distress. He has no wheezes. He has no rales. He exhibits no tenderness.   Abdominal: Soft. Bowel sounds are normal. He exhibits no distension and no mass. There is no tenderness. No hernia.   Healed old surgical scar from liver transplant    Musculoskeletal: Normal range of motion. He exhibits no edema, tenderness or deformity.   Lymphadenopathy:     He has no cervical adenopathy.   Neurological: He is alert and oriented to person, place, and time. He has normal reflexes. He displays normal reflexes. No cranial nerve deficit. He exhibits normal muscle tone. Coordination normal.   Skin: Skin is warm and  dry. No rash noted. He is not diaphoretic. No erythema. No pallor.   Psychiatric: He has a normal mood and affect. His behavior is normal. Judgment and thought content normal.        Significant Labs:   Admission on 02/27/2017   Component Date Value Ref Range Status    POCT Glucose 02/27/2017 376* 70 - 110 mg/dL Final    WBC 02/27/2017 6.17  3.90 - 12.70 K/uL Final    RBC 02/27/2017 4.84  4.60 - 6.20 M/uL Final    Hemoglobin 02/27/2017 14.1  14.0 - 18.0 g/dL Final    Hematocrit 02/27/2017 41.5  40.0 - 54.0 % Final    MCV 02/27/2017 86  82 - 98 fL Final    MCH 02/27/2017 29.1  27.0 - 31.0 pg Final    MCHC 02/27/2017 34.0  32.0 - 36.0 % Final    RDW 02/27/2017 13.5  11.5 - 14.5 % Final    Platelets 02/27/2017 127* 150 - 350 K/uL Final    MPV 02/27/2017 SEE COMMENT  9.2 - 12.9 fL Final    Gran # 02/27/2017 3.0  1.8 - 7.7 K/uL Final    Lymph # 02/27/2017 2.4  1.0 - 4.8 K/uL Final    Mono # 02/27/2017 0.5  0.3 - 1.0 K/uL Final    Eos # 02/27/2017 0.3  0.0 - 0.5 K/uL Final    Baso # 02/27/2017 0.01  0.00 - 0.20 K/uL Final    Gran% 02/27/2017 48.9  38.0 - 73.0 % Final    Lymph% 02/27/2017 38.6  18.0 - 48.0 % Final    Mono% 02/27/2017 7.8  4.0 - 15.0 % Final    Eosinophil% 02/27/2017 4.5  0.0 - 8.0 % Final    Basophil% 02/27/2017 0.2  0.0 - 1.9 % Final    Aniso 02/27/2017 Slight   Final    Poik 02/27/2017 Slight   Final    Poly 02/27/2017 Occasional   Final    Hypo 02/27/2017 Occasional   Final    Ovalocytes 02/27/2017 Occasional   Final    Differential Method 02/27/2017 Automated   Final    Sodium 02/27/2017 132* 136 - 145 mmol/L Final    Potassium 02/27/2017 3.9  3.5 - 5.1 mmol/L Final    Chloride 02/27/2017 99  95 - 110 mmol/L Final    CO2 02/27/2017 23  23 - 29 mmol/L Final    Glucose 02/27/2017 404* 70 - 110 mg/dL Final    BUN, Bld 02/27/2017 27* 6 - 20 mg/dL Final    Creatinine 02/27/2017 1.7* 0.5 - 1.4 mg/dL Final    Calcium 02/27/2017 9.1  8.7 - 10.5 mg/dL Final    Total Protein  02/27/2017 7.3  6.0 - 8.4 g/dL Final    Albumin 02/27/2017 3.2* 3.5 - 5.2 g/dL Final    Total Bilirubin 02/27/2017 0.8  0.1 - 1.0 mg/dL Final    Alkaline Phosphatase 02/27/2017 276* 55 - 135 U/L Final    AST 02/27/2017 146* 10 - 40 U/L Final    ALT 02/27/2017 119* 10 - 44 U/L Final    Anion Gap 02/27/2017 10  8 - 16 mmol/L Final    eGFR if  02/27/2017 49.9* >60 mL/min/1.73 m^2 Final    eGFR if non  02/27/2017 43.2* >60 mL/min/1.73 m^2 Final    Prothrombin Time 02/27/2017 10.9  9.0 - 12.5 sec Final    INR 02/27/2017 1.0  0.8 - 1.2 Final    TSH 02/27/2017 3.190  0.400 - 4.000 uIU/mL Final    Lipase 02/27/2017 152* 4 - 60 U/L Final    POCT Glucose 02/27/2017 125* 70 - 110 mg/dL Final    POC Glucose 02/27/2017 98  70 - 110 mg/dL Final    POC BUN 02/27/2017 24  6 - 30 mg/dL Final    POC Creatinine 02/27/2017 1.2  0.5 - 1.4 mg/dL Final    POC Sodium 02/27/2017 139  136 - 145 mmol/L Final    POC Potassium 02/27/2017 3.3* 3.5 - 5.1 mmol/L Final    POC Chloride 02/27/2017 102  95 - 110 mmol/L Final    POC TCO2 (MEASURED) 02/27/2017 24  23 - 29 mmol/L Final    POC Ionized Calcium 02/27/2017 1.12  1.06 - 1.42 mmol/L Final    POC Hematocrit 02/27/2017 39  36 - 54 %PCV Final    Sample 02/27/2017 DAINA   Final    POCT Glucose 02/28/2017 189* 70 - 110 mg/dL Final    POCT Glucose 02/28/2017 223* 70 - 110 mg/dL Final         Significant Imaging: I have reviewed and interpreted all pertinent imaging results/findings within the past 24 hours.    Assessment/Plan:     * Type 2 diabetes mellitus with hyperosmolar nonketotic hyperglycemia  - HHS with glucose of 613 in clinic lab ( unclear etiology of hyperglycemia )  - Glucose 404 in ED  - No signs of active infection    - Not in DKA   - Received 2 L NS bolus and 10 units regular insulin IVP in ED  - Recent glucose 223   Will give another liver NS bolus   - Patient reports compliance to insulin regimen   - Restart home regimen of  levemir 45 nightly and novolog 12 TIDWM           CKD stage 2 due to type 2 diabetes mellitus  - Creatinine 1.7 which is slightly higher than baseline of 1.5  - Expect to improve with IVF   - F/U renal panel in morning       Liver replaced by transplant  - No sings of acute liver decompensation   - Continue prograf 2 mg in am and 1 mg in pm   - F/U with hepatology clinic as outpatient       Volume depletion  - IVF       Hypothyroidism  - Continue synthroid       Lumbar radiculopathy  - oxycodone prn for chronic low back pain       Essential hypertension  - Continue metoprolol, lisinopril and adalat       VTE Risk Mitigation         Ordered     enoxaparin injection 40 mg  Daily     Route:  Subcutaneous        02/28/17 0340     Medium Risk of VTE  Once      02/28/17 0340     Place AMINATA hose  Until discontinued      02/27/17 2358     Place sequential compression device  Until discontinued      02/27/17 2358        Sharonda Miller DO  Department of Hospital Medicine   [facility]

## 2017-02-28 NOTE — PLAN OF CARE
Problem: Patient Care Overview  Goal: Plan of Care Review  Outcome: Revised  Plan of care discussed with patient. Patient is free of fall/trauma/injury. Denies CP, SOB. PRN pain meds givenfor pain. All questions addressed. Will continue to monitor

## 2017-02-28 NOTE — ASSESSMENT & PLAN NOTE
- HHS with glucose of 613 in clinic lab ( unclear etiology of hyperglycemia )  - Glucose 404 in ED  - No signs of active infection    - Not in DKA   - Received 2 L NS bolus and 10 units regular insulin IVP in ED  - Recent glucose 223   Will give another liver NS bolus   - Patient reports compliance to insulin regimen   - Restart home regimen of levemir 45 nightly and novolog 12 TIDWM

## 2017-02-28 NOTE — NURSING TRANSFER
Nursing Transfer Note      2/28/2017     Transfer to  from ED    Transfer via stretcher    Transfer with belongings    Transported by RN staff from ED    Medicines sent: n/a    Chart send with patient: Yes    Notified: self    Patient reassessed at: 02/28/2017 @ 0322    Upon arrival to floor: cardiac monitor applied, patient oriented to room, call bell in reach and bed in lowest position    Pt arrived to unit by RN Staff. AOx4, VSS, denies any SOB, with mild pain in left foot. Pt already received oxycodone in ED prior to arriving to floor. Blood glucose recheck, resulted 233, MD aware (Dr. Miller). Plan of care discussed with pt, verbalized understanding. Will continue to monitor.

## 2017-03-01 ENCOUNTER — TELEPHONE (OUTPATIENT)
Dept: TRANSPLANT | Facility: CLINIC | Age: 62
End: 2017-03-01

## 2017-03-01 ENCOUNTER — OFFICE VISIT (OUTPATIENT)
Dept: PAIN MEDICINE | Facility: CLINIC | Age: 62
End: 2017-03-01
Payer: MEDICARE

## 2017-03-01 VITALS
DIASTOLIC BLOOD PRESSURE: 82 MMHG | RESPIRATION RATE: 18 BRPM | TEMPERATURE: 99 F | WEIGHT: 266.75 LBS | HEART RATE: 82 BPM | HEIGHT: 73 IN | SYSTOLIC BLOOD PRESSURE: 115 MMHG | BODY MASS INDEX: 35.35 KG/M2

## 2017-03-01 DIAGNOSIS — G89.4 CHRONIC PAIN SYNDROME: ICD-10-CM

## 2017-03-01 DIAGNOSIS — M96.1 POSTLAMINECTOMY SYNDROME OF LUMBAR REGION: Primary | ICD-10-CM

## 2017-03-01 DIAGNOSIS — F11.90 CHRONIC, CONTINUOUS USE OF OPIOIDS: ICD-10-CM

## 2017-03-01 DIAGNOSIS — Z98.1 S/P LUMBAR SPINAL FUSION: ICD-10-CM

## 2017-03-01 DIAGNOSIS — M54.16 LUMBAR RADICULOPATHY: ICD-10-CM

## 2017-03-01 PROCEDURE — 99214 OFFICE O/P EST MOD 30 MIN: CPT | Mod: S$PBB,,, | Performed by: NURSE PRACTITIONER

## 2017-03-01 PROCEDURE — 99213 OFFICE O/P EST LOW 20 MIN: CPT | Mod: PBBFAC | Performed by: NURSE PRACTITIONER

## 2017-03-01 PROCEDURE — 99999 PR PBB SHADOW E&M-EST. PATIENT-LVL III: CPT | Mod: PBBFAC,,, | Performed by: NURSE PRACTITIONER

## 2017-03-01 RX ORDER — OXYCODONE HYDROCHLORIDE 15 MG/1
15 TABLET ORAL 4 TIMES DAILY PRN
Qty: 120 TABLET | Refills: 0 | Status: SHIPPED | OUTPATIENT
Start: 2017-03-01 | End: 2017-03-01 | Stop reason: SDUPTHER

## 2017-03-01 RX ORDER — OXYCODONE HYDROCHLORIDE 15 MG/1
15 TABLET ORAL 4 TIMES DAILY PRN
Qty: 120 TABLET | Refills: 0 | Status: SHIPPED | OUTPATIENT
Start: 2017-04-01 | End: 2017-04-24

## 2017-03-01 RX ORDER — OXYCODONE HYDROCHLORIDE 15 MG/1
15 TABLET ORAL 4 TIMES DAILY PRN
Qty: 120 TABLET | Refills: 0 | Status: SHIPPED | OUTPATIENT
Start: 2017-05-01 | End: 2017-05-31 | Stop reason: SDUPTHER

## 2017-03-01 RX ORDER — GABAPENTIN 600 MG/1
600 TABLET ORAL 4 TIMES DAILY
Qty: 120 TABLET | Refills: 2 | Status: SHIPPED | OUTPATIENT
Start: 2017-03-01 | End: 2017-05-31 | Stop reason: SDUPTHER

## 2017-03-01 NOTE — LETTER
March 1, 2017    Vick Carlos  [address] [address]          Dear Vick Gonzalez:  MRN: [id number]    Your liver lab results were elevated but stable.  There are no medicine changes.  Please have your labs drawn again on 3/27/17.      Your blood sugar has been dangerously high on your labs.  It is very important that you follow closely with both your Primary Care and Endocrinologist to get this under control.  Over time, elevated blood sugars can lead the heart disease, vascular disease, kidney and eye damage, stroke, etc.  Diabetes can be very dangerous if not treated.    If you cannot have your labs drawn on the scheduled date, it is your responsibility to call the transplant department to reschedule.  To reschedule or make an appointment, please as to speak to or leave a message for my assistant, Portia Lincoln, at [phone].  When leaving a message for Latonia Pearce, or myself, we ask that you leave a brief message regarding your request.    Sincerely,    Kandy Herrera, RN, BSN  Liver Transplant Coordinator  [facility]  [address] [place] [id number]  [phone]

## 2017-03-01 NOTE — NURSING
Patient is ready for discharge. Patient stable alert and oriented. Tele and  IVs removed. No complaints of pain. Discussed discharge plan. Reviewed medications and side effects, appointments, and answered questions with patient and family.

## 2017-03-01 NOTE — TELEPHONE ENCOUNTER
Letter sent, liver labs elevated but stable.  Advised that he needs to follow closely with PCP/Endo to get blood sugars under control.  Repeat labs due in 1 month - 3/27/17.

## 2017-03-01 NOTE — TELEPHONE ENCOUNTER
----- Message from James Coleman MD sent at 3/1/2017  3:10 PM CST -----  Results reviewed  Very high glucose

## 2017-03-01 NOTE — MR AVS SNAPSHOT
[facility]  [address]  [place] [address]  Phone: [phone]  Fax: [phone]                  Vick Gonzalez   3/1/2017 10:00 AM   Office Visit    Description:  Male : 1955   Provider:  Emma Batista NP   Department:  [facility]           Reason for Visit     Back Pain           Diagnoses this Visit        Comments    Postlaminectomy syndrome of lumbar region    -  Primary     Chronic, continuous use of opioids         Lumbar radiculopathy         S/P lumbar spinal fusion         Chronic pain syndrome                To Do List           Future Appointments        Provider Department Dept Phone    3/24/2017 10:40 AM Emanuel Lopez MD [facility] [phone]    2017 10:00 AM ALFONZO Haro [facility] [phone]    2017 10:30 AM Emma Batista NP [facility] [phone]      Goals (5 Years of Data)     None       These Medications        Disp Refills Start End    gabapentin (NEURONTIN) 600 MG tablet 120 tablet 2 3/1/2017 2017    Take 1 tablet (600 mg total) by mouth 4 (four) times daily. - Oral    Pharmacy: [facility]/pharmacy #[id number] - [place] - [address] Ph #: [phone]       oxycodone (ROXICODONE) 15 MG Tab 120 tablet 0 3/1/2017 3/31/2017    Take 1 tablet (15 mg total) by mouth 4 (four) times daily as needed for Pain. - Oral    Pharmacy: [facility]/pharmacy #[id number] - [place] - [address] Ph #: [phone]         [facility] On Call     [facility] Nurse Care Line -  Assistance  Registered nurses in the [facility] provide clinical advisement, health education, appointment booking, and other advisory services.  Call for this free service at [phone].             Medications           Message regarding Medications     Verify the changes and/or additions to your medication regime listed below are the same as discussed with your  clinician today.  If any of these changes or additions are incorrect, please notify your healthcare provider.        START taking these NEW medications        Refills    oxycodone (ROXICODONE) 15 MG Tab 0    Sig: Take 1 tablet (15 mg total) by mouth 4 (four) times daily as needed for Pain.    Class: Print    Route: Oral           Verify that the below list of medications is an accurate representation of the medications you are currently taking.  If none reported, the list may be blank. If incorrect, please contact your healthcare provider. Carry this list with you in case of emergency.           Current Medications     allopurinol (ZYLOPRIM) 100 MG tablet Take 1 tablet (100 mg total) by mouth 2 (two) times daily.    amitriptyline (ELAVIL) 25 MG tablet Take 1 tablet (25 mg total) by mouth every evening. For nerve pain and sleep    blood sugar diagnostic Strp 1 strip by Misc.(Non-Drug; Combo Route) route 4 (four) times daily before meals and nightly.    blood-glucose meter Misc 1 each by Misc.(Non-Drug; Combo Route) route 4 (four) times daily before meals and nightly.    calcium carbonate-vitamin D3 (CALTRATE 600 + D) 600 mg(1,500mg) -400 unit Chew     gabapentin (NEURONTIN) 600 MG tablet Take 1 tablet (600 mg total) by mouth 4 (four) times daily.    insulin aspart (NOVOLOG) 100 unit/mL InPn pen Inject 12 Units into the skin 3 (three) times daily with meals.    insulin detemir (LEVEMIR FLEXTOUCH) 100 unit/mL (3 mL) SubQ InPn pen Inject 55 Units into the skin every evening.    lancets Misc 1 each by Misc.(Non-Drug; Combo Route) route 4 (four) times daily before meals and nightly.    lancing device Misc 1 each by Misc.(Non-Drug; Combo Route) route 4 (four) times daily before meals and nightly.    levothyroxine (SYNTHROID) 88 MCG tablet Take 1 tablet (88 mcg total) by mouth once daily.    lisinopril 10 MG tablet Take 1 tablet (10 mg total) by mouth once daily.    metoprolol tartrate (LOPRESSOR) 50 MG tablet TAKE 3  "TABLETS BY MOUTH 2 (TWO) TIMES DAILY.    multivitamin (THERAGRAN) per tablet Take 1 tablet by mouth.    NEXIUM 40 mg capsule TAKE ONE CAPSULE BY MOUTH EVERY DAY    nifedipine (ADALAT CC) 60 MG TbSR Take 1 tablet (60 mg total) by mouth once daily.    oxycodone (ROXICODONE) 15 MG Tab Take 1 tablet (15 mg total) by mouth 4 (four) times daily as needed for Pain.    pen needle, diabetic (BD ULTRA-FINE MENDY PEN NEEDLES) 32 gauge x 5/32" Ndle Use with aspart TIDWM and with detemir QHS    sertraline (ZOLOFT) 100 MG tablet Take 1 tablet (100 mg total) by mouth once daily.    sildenafil (VIAGRA) 100 MG tablet Take 1 tablet (100 mg total) by mouth daily as needed for Erectile Dysfunction.    tacrolimus (PROGRAF) 1 MG Cap Take 2mg in AM and 1mg in PM by mouth Liver Transplant Z94.4    ZETIA 10 mg tablet TAKE 1 TABLET BY MOUTH EVERY DAY           Clinical Reference Information           Your Vitals Were     BP                   115/82           Blood Pressure          Most Recent Value    BP  115/82      Allergies as of 3/1/2017     Naproxen    Oxaprozin      Immunizations Administered on Date of Encounter - 3/1/2017     None      Maintenance Dialysis History     Patient has no recorded history of maintenance dialysis.      Transplant Information        Txp Date Organ Coordinator Care Team    6/9/2010 Liver Kandy Herrera RN Surgeon:  Bin Maurer MD   Referring Physician:  Flaco Alexander MD   Assisting Surgeon:  Andrea Mackey MD   Current Hepatologist:  James Coleman MD   Corresponding Physician:  Emanuel Lopez MD   Current PCP:  Emanuel Lopez MD         [facility] Sign-Up     Activating your [facility] account is as easy as 1-2-3!     1) Visit [email or web address], select Sign Up Now, enter this activation code and your date of birth, then select Next.  [email or web address]  Expires: 4/8/2017  2:46 AM      2) Create a username and password to use when you visit [facility] in the future and select a security " question in case you lose your password and select Next.    3) Enter your e-mail address and click Sign Up!    Additional Information  If you have questions, please e-mail [email or web address] or call [phone] to talk to our [facility] staff. Remember, [facility] is NOT to be used for urgent needs. For medical emergencies, dial 911.         Language Assistance Services     ATTENTION: Language assistance services are available, free of charge. Please call [phone].      ATENCIÓN: Si habla español, tiene a palmer disposición servicios gratuitos de asistencia lingüística. Llame al [phone].     CHÚ Ý: N?u b?n nói Ti?ng Vi?t, có các d?ch v? h? tr? ngôn ng? mi?n phí dành cho b?n. G?i s? [phone].         [facility] complies with applicable Federal civil rights laws and does not discriminate on the basis of race, color, national origin, age, disability, or sex.

## 2017-03-09 NOTE — DISCHARGE SUMMARY
[facility] Hospital Medicine  Discharge Summary      Patient Name: Vick Gonzalez  MRN: [id number]  Admission Date: 2/27/2017  Hospital Length of Stay: 0 days  Discharge Date and Time: 2/28/2017  6:30 PM  Attending Physician: No att. providers found   Discharging Provider: Clint Rodrigues MD  Primary Care Provider: Emanuel Lopez MD  [facility] Medicine Team: [facility] B Clint Rodrigues MD    HPI:   61 M s/p liver transplant in 2009 for alcoholic cirrhosis and currently on prograf, HTN, uncontrolled IDDM2 ( A1C 9.0 )  c/b diabetic neuropathy, CKDII ( baseline creatinine 1.5 ), chronic low back pain presents to ED with elevated blood glucose of 613. Patient had routine labs done yesterday by hepatology clinic and he was called by transplant coordinator later to come to ED for evaluation of high blood sugar. Endorses polyuria, polydipsia and mild dizziness for last one week upon asking. Notes he checks CBG regularly at home and it was in low 300 this morning. States he is compliant with his home insulin regimen of levemir 45 U nightly and novolog 18 U TIDWM. Denies fever, chills, dietary indiscretion, chest pain, SOB, N/V/D, abdominal pain, urinary symptoms.  Denies tobacco, alcohol, illicit drug use. Compliant with his prograf bid.     His repeat glucose 404 in ED and received 2 L NS bolus, 10 U regular insulin IVP X 1. Recent .       * No surgery found *      Indwelling Lines/Drains at time of discharge:   Lines/Drains/Airways          No matching active lines, drains, or airways        Hospital Course:   61 M s/p liver transplant in 2009 for alcoholic cirrhosis and currently on prograf, HTN, uncontrolled IDDM2 ( A1C 9.0 )  c/b diabetic neuropathy, CKDII ( baseline creatinine 1.5 ), chronic low back pain presents to ED with elevated blood glucose of 613. Patient had routine labs done yesterday by hepatology clinic and he was called by transplant coordinator later to come to ED for  evaluation of high blood sugar. Endorses polyuria, polydipsia and mild dizziness for last one week upon asking. Notes he checks CBG regularly at home and it was in low 300 this morning. States he is compliant with his home insulin regimen of levemir 45 U nightly and novolog 18 U TIDWM. Denies fever, chills, dietary indiscretion, chest pain, SOB, N/V/D, abdominal pain, urinary symptoms.  Denies tobacco, alcohol, illicit drug use. Compliant with his prograf bid.     His repeat glucose 404 in ED and received 2 L NS bolus, 10 U regular insulin IVP X 1. Recent .     Was observed for one day and BS were remarkably well controlled. Will dc home on same insulin dose, complianec is stressed.         Consults:     Significant Diagnostic Studies: Labs: BMP: No results for input(s): GLU, NA, K, CL, CO2, BUN, CREATININE, CALCIUM, MG in the last 48 hours.    Pending Diagnostic Studies:     None        Final Active Diagnoses:    Diagnosis Date Noted POA    PRINCIPAL PROBLEM:  Type 2 diabetes mellitus with hyperosmolar nonketotic hyperglycemia [E11.01] 02/28/2017 Yes    Volume depletion [E86.9] 02/28/2017 Yes    Hyperglycemia [R73.9] 02/27/2017 Yes    CKD stage 2 due to type 2 diabetes mellitus [E11.22, N18.2] 10/05/2016 Yes    Diabetes mellitus type II, uncontrolled [E11.65] 10/04/2016 Yes    Essential hypertension [I10] 06/19/2015 Yes    Lumbar radiculopathy [M54.16] 04/08/2015 Yes    Hypothyroidism [E03.9]  Yes    Liver replaced by transplant [Z94.4] 08/23/2011 Not Applicable    Need for prophylactic immunotherapy [Z41.8] 06/02/2011 Not Applicable      Problems Resolved During this Admission:    Diagnosis Date Noted Date Resolved POA      No new Assessment & Plan notes have been filed under this hospital service since the last note was generated.  Service: Hospital Medicine      Discharged Condition: good    Disposition: Home or Self Care    Follow Up:    Patient Instructions:   No discharge procedures on  file.  Medications:  Reconciled Home Medications:   Discharge Medication List as of 2/28/2017  4:54 PM      CONTINUE these medications which have NOT CHANGED    Details   allopurinol (ZYLOPRIM) 100 MG tablet Take 1 tablet (100 mg total) by mouth 2 (two) times daily., Starting 7/19/2016, Until Discontinued, Normal      amitriptyline (ELAVIL) 25 MG tablet Take 1 tablet (25 mg total) by mouth every evening. For nerve pain and sleep, Starting 8/31/2016, Until Discontinued, Normal      insulin aspart (NOVOLOG) 100 unit/mL InPn pen Inject 12 Units into the skin 3 (three) times daily with meals., Starting 10/7/2016, Until Sat 10/7/17, Normal      insulin detemir (LEVEMIR FLEXTOUCH) 100 unit/mL (3 mL) SubQ InPn pen Inject 55 Units into the skin every evening., Starting 2/23/2017, Until Discontinued, Normal      levothyroxine (SYNTHROID) 88 MCG tablet Take 1 tablet (88 mcg total) by mouth once daily., Starting 8/2/2016, Until Discontinued, Normal      lisinopril 10 MG tablet Take 1 tablet (10 mg total) by mouth once daily., Starting 8/2/2016, Until Discontinued, Normal      metoprolol tartrate (LOPRESSOR) 50 MG tablet TAKE 3 TABLETS BY MOUTH 2 (TWO) TIMES DAILY., Normal      multivitamin (THERAGRAN) per tablet Take 1 tablet by mouth., Starting 6/27/2012, Until Discontinued, Historical Med      NEXIUM 40 mg capsule TAKE ONE CAPSULE BY MOUTH EVERY DAY, Normal      nifedipine (ADALAT CC) 60 MG TbSR Take 1 tablet (60 mg total) by mouth once daily., Starting 2/23/2017, Until Discontinued, Normal      tacrolimus (PROGRAF) 1 MG Cap Take 2mg in AM and 1mg in PM by mouth Liver Transplant Z94.4, Normal      ZETIA 10 mg tablet TAKE 1 TABLET BY MOUTH EVERY DAY, Normal      gabapentin (NEURONTIN) 600 MG tablet Take 1 tablet (600 mg total) by mouth 4 (four) times daily., Starting 11/30/2016, Until Tue 2/28/17, Normal      blood sugar diagnostic Strp 1 strip by Misc.(Non-Drug; Combo Route) route 4 (four) times daily before meals and  "nightly., Starting 1/25/2017, Until Discontinued, Print      blood-glucose meter Misc 1 each by Misc.(Non-Drug; Combo Route) route 4 (four) times daily before meals and nightly., Starting 10/7/2016, Until Sat 10/7/17, Normal      calcium carbonate-vitamin D3 (CALTRATE 600 + D) 600 mg(1,500mg) -400 unit Chew Starting 6/27/2012, Until Discontinued, Historical Med      lancets Misc 1 each by Misc.(Non-Drug; Combo Route) route 4 (four) times daily before meals and nightly., Starting 11/8/2016, Until Discontinued, Normal      lancing device Misc 1 each by Misc.(Non-Drug; Combo Route) route 4 (four) times daily before meals and nightly., Starting 10/7/2016, Until Sat 10/7/17, Normal      pen needle, diabetic (BD ULTRA-FINE MENDY PEN NEEDLES) 32 gauge x 5/32" Ndle Use with aspart TIDWM and with detemir QHS, Normal      sertraline (ZOLOFT) 100 MG tablet Take 1 tablet (100 mg total) by mouth once daily., Starting 8/2/2016, Until Discontinued, Normal      sildenafil (VIAGRA) 100 MG tablet Take 1 tablet (100 mg total) by mouth daily as needed for Erectile Dysfunction., Starting 1/19/2016, Until Wed 1/18/17, Normal         STOP taking these medications       oxycodone (ROXICODONE) 15 MG Tab Comments:   Reason for Stopping:             Time spent on the discharge of patient: 30 minutes    Clint Rodrigues MD  Department of Hospital Medicine  [facility]  "

## 2017-03-24 ENCOUNTER — OFFICE VISIT (OUTPATIENT)
Dept: FAMILY MEDICINE | Facility: CLINIC | Age: 62
End: 2017-03-24
Payer: MEDICARE

## 2017-03-24 VITALS
HEIGHT: 73 IN | WEIGHT: 262.56 LBS | BODY MASS INDEX: 34.8 KG/M2 | DIASTOLIC BLOOD PRESSURE: 80 MMHG | HEART RATE: 71 BPM | SYSTOLIC BLOOD PRESSURE: 136 MMHG | OXYGEN SATURATION: 98 %

## 2017-03-24 DIAGNOSIS — N18.30 CKD (CHRONIC KIDNEY DISEASE), STAGE III: ICD-10-CM

## 2017-03-24 DIAGNOSIS — I10 BENIGN ESSENTIAL HTN: ICD-10-CM

## 2017-03-24 DIAGNOSIS — R68.89 ABNORMAL ANKLE BRACHIAL INDEX: Primary | ICD-10-CM

## 2017-03-24 DIAGNOSIS — R35.89 POLYURIA: ICD-10-CM

## 2017-03-24 PROCEDURE — 99999 PR PBB SHADOW E&M-EST. PATIENT-LVL V: CPT | Mod: PBBFAC,,, | Performed by: FAMILY MEDICINE

## 2017-03-24 PROCEDURE — 99215 OFFICE O/P EST HI 40 MIN: CPT | Mod: S$PBB,,, | Performed by: FAMILY MEDICINE

## 2017-03-24 PROCEDURE — 99215 OFFICE O/P EST HI 40 MIN: CPT | Mod: PBBFAC,PO | Performed by: FAMILY MEDICINE

## 2017-03-24 RX ORDER — INSULIN ASPART 100 [IU]/ML
12 INJECTION, SOLUTION INTRAVENOUS; SUBCUTANEOUS
Qty: 10.8 ML | Refills: 11 | Status: SHIPPED | OUTPATIENT
Start: 2017-03-24 | End: 2018-01-29 | Stop reason: SDUPTHER

## 2017-03-24 NOTE — MR AVS SNAPSHOT
[facility]  [address]  [place] [address]  Phone: [phone]  Fax: [phone]                  Vick Gonzalez   3/24/2017 10:40 AM   Office Visit    Description:  Male : 1955   Provider:  Emanuel Lopez MD   Department:  [facility]           Reason for Visit     1 month check up           Diagnoses this Visit        Comments    Abnormal ankle brachial index    -  Primary     Uncontrolled type 2 diabetes mellitus with diabetic neuropathy, with long-term current use of insulin         CKD (chronic kidney disease), stage III         Benign essential HTN         Polyuria                To Do List           Future Appointments        Provider Department Dept Phone    3/27/2017 8:30 AM [facility]POINT[facility] [phone]    3/27/2017 8:40 AM APPOINTMENT LAB, JE[facility] DISLA [facility] [phone]    3/27/2017 4:15 PM [facility] [address] [facility] [phone]    2017 9:00 AM James Coleman MD [facility] - Liver Transplant [phone]    2017 10:00 AM Masha Holguin [facility] - Pain Management [phone]      Goals (5 Years of Data)     None       These Medications        Disp Refills Start End    insulin aspart (NOVOLOG) 100 unit/mL InPn pen 10.8 mL 11 3/24/2017 3/24/2018    Inject 12 Units into the skin 3 (three) times daily with meals. - Subcutaneous    Pharmacy: [facility] [place] - [address] [place] Ph #: [phone]         [facility]     [facility] On Call Nurse Care Line -  Assistance  Registered nurses in the [facility] provide clinical advisement, health education, appointment booking, and other advisory services.  Call for this free service at [phone].             Medications           Message regarding Medications     Verify the changes and/or additions to your medication  regime listed below are the same as discussed with your clinician today.  If any of these changes or additions are incorrect, please notify your healthcare provider.             Verify that the below list of medications is an accurate representation of the medications you are currently taking.  If none reported, the list may be blank. If incorrect, please contact your healthcare provider. Carry this list with you in case of emergency.           Current Medications     allopurinol (ZYLOPRIM) 100 MG tablet Take 1 tablet (100 mg total) by mouth 2 (two) times daily.    amitriptyline (ELAVIL) 25 MG tablet Take 1 tablet (25 mg total) by mouth every evening. For nerve pain and sleep    blood sugar diagnostic Strp 1 strip by Misc.(Non-Drug; Combo Route) route 4 (four) times daily before meals and nightly.    blood-glucose meter Misc 1 each by Misc.(Non-Drug; Combo Route) route 4 (four) times daily before meals and nightly.    calcium carbonate-vitamin D3 (CALTRATE 600 + D) 600 mg(1,500mg) -400 unit Chew     gabapentin (NEURONTIN) 600 MG tablet Take 1 tablet (600 mg total) by mouth 4 (four) times daily.    insulin aspart (NOVOLOG) 100 unit/mL InPn pen Inject 12 Units into the skin 3 (three) times daily with meals.    insulin detemir (LEVEMIR FLEXTOUCH) 100 unit/mL (3 mL) SubQ InPn pen Inject 55 Units into the skin every evening.    lancets Misc 1 each by Misc.(Non-Drug; Combo Route) route 4 (four) times daily before meals and nightly.    lancing device Misc 1 each by Misc.(Non-Drug; Combo Route) route 4 (four) times daily before meals and nightly.    levothyroxine (SYNTHROID) 88 MCG tablet Take 1 tablet (88 mcg total) by mouth once daily.    lisinopril 10 MG tablet Take 1 tablet (10 mg total) by mouth once daily.    metoprolol tartrate (LOPRESSOR) 50 MG tablet TAKE 3 TABLETS BY MOUTH 2 (TWO) TIMES DAILY.    multivitamin (THERAGRAN) per tablet Take 1 tablet by mouth.    NEXIUM 40 mg capsule TAKE ONE CAPSULE BY MOUTH EVERY DAY  "   nifedipine (ADALAT CC) 60 MG TbSR Take 1 tablet (60 mg total) by mouth once daily.    oxycodone (ROXICODONE) 15 MG Tab Starting on Apr 01, 2017. Take 1 tablet (15 mg total) by mouth 4 (four) times daily as needed for Pain.    oxycodone (ROXICODONE) 15 MG Tab Starting on May 01, 2017. Take 1 tablet (15 mg total) by mouth 4 (four) times daily as needed for Pain.    pen needle, diabetic (BD ULTRA-FINE MENDY PEN NEEDLES) 32 gauge x 5/32" Ndle Use with aspart TIDWM and with detemir QHS    sertraline (ZOLOFT) 100 MG tablet Take 1 tablet (100 mg total) by mouth once daily.    tacrolimus (PROGRAF) 1 MG Cap Take 2mg in AM and 1mg in PM by mouth Liver Transplant Z94.4    ZETIA 10 mg tablet TAKE 1 TABLET BY MOUTH EVERY DAY    sildenafil (VIAGRA) 100 MG tablet Take 1 tablet (100 mg total) by mouth daily as needed for Erectile Dysfunction.           Clinical Reference Information           Your Vitals Were     BP Pulse Height Weight SpO2 BMI    130/80 71 6' 1" (1.854 m) 119.1 kg (262 lb 9.1 oz) 98% 34.64 kg/m2      Blood Pressure          Most Recent Value    BP  130/80      Allergies as of 3/24/2017     Naproxen    Oxaprozin      Immunizations Administered on Date of Encounter - 3/24/2017     None      Orders Placed During Today's Visit     Future Labs/Procedures Expected by Expires    CBC auto differential  3/24/2017 3/24/2018    Comprehensive metabolic panel  3/24/2017 3/24/2018    Lipid panel  3/24/2017 3/24/2018    Microalbumin/creatinine urine ratio  3/24/2017 (Approximate) 3/24/2018    Radiology US Lower Extremity Arteries Bilateral  3/24/2017 3/24/2018    Urinalysis  3/24/2017 4/23/2017    Urine culture  3/24/2017 5/23/2018      Maintenance Dialysis History     Patient has no recorded history of maintenance dialysis.      Transplant Information        Txp Date Organ Coordinator Care Team    6/9/2010 Liver Kandy Herrera RN Surgeon:  Bin Maurer MD   Referring Physician:  Flaco Alexander MD   Assisting Surgeon:  " Andrea Mackey MD   Current Hepatologist:  James Coleman MD   Corresponding Physician:  Emanuel Lopez MD   Current PCP:  Emanuel Lopez MD         Instructions    Blood pressure:  You're on lisinopril 10 mg daily, metoprolol 50 mg twice daily, nifedipine 60 mg daily.         Diabetes:  Guideline for managing Levemir insulin: start by going up to 45 units daily     1. Check your fasting blood sugars every morning for 3 days. Goal is to have most of your fasting blood sugars below 140.     2. If most of your fasting blood sugars in that 3 day period are above 140, increase your Levemir dose by 3 units.     3. If most of your fasting blood sugars in that 3 day period of are below 140 but higher than 70, keep your Levemir dose the same.     4. If most of your fasting blood sugars in that 3 day period are below 70, decrease your Levemir dose by 3 units.     5. Make sure to write your blood sugars down in a notebook; do not simply just store the numbers in your glucose meter.           Novolog: keep at 12 units with meals now     Sliding Scale for Novolog      Blood Glucose reading : how many extra units of Novolog to take  150-200 : +2  200-250  : +4  250-300  : +6  300-350  :  +8  >350   : +10     SAMPLE BLOOD SUGAR CHART                 DATE  Before breakfast Before lunch Before dinner Before bedtime                                                                                                                                                   Language Assistance Services     ATTENTION: Language assistance services are available, free of charge. Please call [phone].      ATENCIÓN: Si habla español, tiene a palmer disposición servicios gratuitos de asistencia lingüística. Llame al [phone].     [facility] Ý: N?u b?n nói Ti?ng Vi?t, có các d?ch v? h? tr? ngôn ng? mi?n phí dành cho b?n. G?i s? [phone].         [facility] complies with applicable Federal civil rights laws and  does not discriminate on the basis of race, color, national origin, age, disability, or sex.

## 2017-03-24 NOTE — PROGRESS NOTES
(Portions of this note were dictated using voice recognition software and may contain dictation related errors in spelling/grammar/syntax not found on text review)    CC:   Chief Complaint   Patient presents with    1 month check up       HPI: 61 y.o. male      uncontrolled diabetes requiring insulin.  Was initially hospitalized for hyperosmolar state.  At my last visit, I increased his Levemir 45 units daily with NovoLog at 12 units per meal plus correction factor scale.  Went back to ED and was admitted for observation a few days after my visit with again significant hyperglycemia.  Hospitalization report states that his glycemic control is quite good in the hospital and was discharged on his current medications and advise for follow-up.    Hypertension treatment was also modified last visit.  His nifedipine was increased to 60 mg a day.  He currently also takes lisinopril 10 mg a day and metoprolol 50 mg twice a day    Here today for follow-up.  Did not bring his blood sugar log with him.  He missed his diabetes education evaluation because he had to go for a  and it conflicted with this time.  He states that morning sugars run between 140 and 170.  He states that his before lunch and before dinner sugars may run around 120 up to 180.  No hypoglycemia discussed.  It does not appear that he has uptitrated his Levemir and still takes 45 units a day plus NovoLog 12 units, sometimes up to 18 units depending on his sugar readings.  Still complains of some dry mouth and polydipsia and polyuria but he states it is not as bad as it was initially.  Does describe some burning in his feet    Past Medical History:   Diagnosis Date    Anemia     Anxiety     Chronic hepatitis C virus infection - RELAPSE following harvoni + RBV 2011    Completed 12 weeks Harvoni + RBV w/ RELAPSE 6 weeks out (3/2016)  Wk 1 Hgb 12.4, prograf 6.8 Wk 2 Hgb 12.7 Wk 4 Hgb 12.8, prograf 5.4; HCV RNA 47. INCREASE RBV to 600 Wk 5 Hgb  12.6 Wk 6 Hgb 12.4, HCV RNA <12, detected. INCREASE  Wk 7 Hgb 11.8 Wk 8 Hgb 11.7, prograf 4.0. INCREASE RBV 1000, INCREASE PROGRAF 2/1 Wk 9 Hgb 12.4, prograf 3.6  HCV RNA <12, detected. INCREASE PROGRAF 2/2 Wk 10 hgb     Chronic pain syndrome 7/13/2011    Diabetes mellitus type II, uncontrolled     Discitis of lumbosacral region 1/16/2015    ED (erectile dysfunction)     Genital herpes     Gout, arthritis     History of alcohol abuse     History of positive PPD, treatment status unknown     Pulmonary granulomas, negative sputum cultures for AFB and indeterminate quantferon test    History of substance abuse     Hypertension     Hypothyroidism     Peptic ulcer disease        Past Surgical History:   Procedure Laterality Date    CHOLECYSTECTOMY      LIVER TRANSPLANT  06/2010    SPINE SURGERY         Family History   Problem Relation Age of Onset    Cancer Mother     Diabetes Mother     Heart disease Mother     Diabetes Sister     Cancer Maternal Uncle 82     colon CA    Melanoma Neg Hx     Psoriasis Neg Hx     Lupus Neg Hx     Eczema Neg Hx     Acne Neg Hx        Social History     Social History    Marital status:      Spouse name: N/A    Number of children: N/A    Years of education: N/A     Occupational History    Not on file.     Social History Main Topics    Smoking status: Former Smoker    Smokeless tobacco: Never Used    Alcohol use No      Comment: over 5 years ago, none currently    Drug use: No    Sexual activity: Not on file     Other Topics Concern    Not on file     Social History Narrative     SCREENINGS  Colonoscopy 2015, return in 10 years  prostate testing 2016/8      Immunizations  Pneumovax up-to-date 2008  Hepatitis B series up-to-date  Patient states he had tetanus shot within the last 10 years  Flu: UTD    Lab Results   Component Value Date    WBC 5.69 02/28/2017    HGB 12.8 (L) 02/28/2017    HCT 38.9 (L) 02/28/2017     (L) 02/28/2017     CHOL 97 (L) 10/06/2016    TRIG 276 (H) 10/06/2016    HDL 21 (L) 10/06/2016     (H) 02/28/2017     (H) 02/28/2017     (L) 02/28/2017    K 3.9 02/28/2017     02/28/2017    CREATININE 1.4 02/28/2017    BUN 23 (H) 02/28/2017    CO2 25 02/28/2017    TSH 3.190 02/27/2017    PSA 0.30 09/06/2016    INR 1.0 02/27/2017    HGBA1C 9.0 (H) 02/21/2017    LDLCALC 20.8 (L) 10/06/2016     (H) 02/28/2017         ROS:  GENERAL: No fever, chills, fatigability or weight loss.  SKIN: No rashes, no itching.  HEAD: No headaches.  EYES: No visual changes  EARS: No ear pain or changes in hearing.  NOSE: No congestion or rhinorrhea.  MOUTH & THROAT: No hoarseness, change in voice, or sore throat.  NODES: Denies swollen glands.  CHEST: Denies NARAYANAN, cyanosis, wheezing, cough and sputum production.  CARDIOVASCULAR: Denies chest pain, PND, orthopnea.  ABDOMEN: No nausea, vomiting, or changes in bowel function.  URINARY: No flank pain, dysuria or hematuria.  PERIPHERAL VASCULAR: No claudication or cyanosis.  MUSCULOSKELETAL: Chronic back pain  NEUROLOGIC: No above    Vital signs reviewed  PE:   APPEARANCE: Well nourished, well developed, in no acute distress.    HEAD: Normocephalic, atraumatic.  EYES: PERRL. EOMI.   Conjunctivae noninjected.  EARS: TM's intact. Light reflex normal. No retraction or perforation  NOSE: Mucosa pink. Airway clear.  MOUTH & THROAT: No tonsillar enlargement. No pharyngeal erythema or exudate.   NECK: Supple with no cervical lymphadenopathy.    CHEST: Good inspiratory effort. Lungs clear to auscultation with no wheezes or crackles.  CARDIOVASCULAR: Normal S1, S2. No rubs, murmurs, or gallops.  ABDOMEN: Bowel sounds normal. Not distended. Soft. No tenderness or masses. No organomegaly.  EXTREMITIES: No edema, cyanosis, or clubbing.  Loss of hair on extremities, shiny texture to skin of his legs bilaterally  DIABETIC FOOT EXAM: Protective Sensation (w/ 10 gram monofilament):  Right:  Absent  Left: Absent    Visual Inspection:  Onychomycosis -  Bilateral    Pedal Pulses:   Right: Absent  Left: Absent    Posterior tibialis:   Right:Absent  Left: Absent          IMPRESSION    1. Abnormal ankle brachial index    2. Uncontrolled type 2 diabetes mellitus with diabetic neuropathy, with long-term current use of insulin    3. CKD (chronic kidney disease), stage III    4. Benign essential HTN    5. Polyuria          PLAN  Reviewed second hospitalization summary.  Reviewed his labs as above.  Reviewed his peripheral arterial evaluation as noted below.    Diabetes health maintenance:  -- advise regular and consistent glucose monitoring and medication compliance.  -- advise daily foot checks  -- advise yearly ophthalmologic exams  -- advise adequate dietary and exercise modification  -- advise regular dental visits  -- advise daily low-dose aspirin use if patient is not on other anticoagulants and there are no other contraindications.  -- Medication management: Unfortunately does not have his blood sugar readings to fully reviewed in the office.  At this time he will continue his Levemir 45 units daily along with NovoLog 12 units plus correction factor with meals.  He was reprinted the initial instructions I gave him last month regarding up titration of his Levemir depending on his blood sugar readings.  Follow-up in 2 months.  We will recheck labs below including UA and culture and microalbumin given his urinary concerns.  2 early to do A1c but will do this at following visit    Hypertension: Better on recheck.  Continue current therapy    Absent peripheral pulses, given physical exam findings above, will we will have to check for circulatory compromise.  Had NORMA done several years ago but this was not completely interpretable secondary to noncompressible vessels.  Will order arterial ultrasound to further evaluate PAD, sometimes he complains of some exertional cramping that can be claudication  related.    Orders Placed This Encounter   Procedures    Urine culture    Radiology US Lower Extremity Arteries Bilateral    CBC auto differential    Comprehensive metabolic panel    Lipid panel    Microalbumin/creatinine urine ratio    Urinalysis

## 2017-03-24 NOTE — PATIENT INSTRUCTIONS
Blood pressure:  You're on lisinopril 10 mg daily, metoprolol 50 mg twice daily, nifedipine 60 mg daily.         Diabetes:  Guideline for managing Levemir insulin: start by going up to 45 units daily     1. Check your fasting blood sugars every morning for 3 days. Goal is to have most of your fasting blood sugars below 140.     2. If most of your fasting blood sugars in that 3 day period are above 140, increase your Levemir dose by 3 units.     3. If most of your fasting blood sugars in that 3 day period of are below 140 but higher than 70, keep your Levemir dose the same.     4. If most of your fasting blood sugars in that 3 day period are below 70, decrease your Levemir dose by 3 units.     5. Make sure to write your blood sugars down in a notebook; do not simply just store the numbers in your glucose meter.           Novolog: keep at 12 units with meals now     Sliding Scale for Novolog      Blood Glucose reading : how many extra units of Novolog to take  150-200 : +2  200-250  : +4  250-300  : +6  300-350  :  +8  >350   : +10     SAMPLE BLOOD SUGAR CHART                 DATE  Before breakfast Before lunch Before dinner Before bedtime

## 2017-03-27 ENCOUNTER — HOSPITAL ENCOUNTER (OUTPATIENT)
Dept: RADIOLOGY | Facility: HOSPITAL | Age: 62
Discharge: HOME | End: 2017-03-27
Attending: FAMILY MEDICINE
Payer: MEDICARE

## 2017-03-27 DIAGNOSIS — R68.89 ABNORMAL ANKLE BRACHIAL INDEX: ICD-10-CM

## 2017-03-27 DIAGNOSIS — N18.30 CKD (CHRONIC KIDNEY DISEASE), STAGE III: ICD-10-CM

## 2017-03-27 DIAGNOSIS — I10 BENIGN ESSENTIAL HTN: ICD-10-CM

## 2017-03-27 PROCEDURE — 93925 LOWER EXTREMITY STUDY: CPT | Mod: 26,GC,, | Performed by: RADIOLOGY

## 2017-03-27 PROCEDURE — 93925 LOWER EXTREMITY STUDY: CPT | Mod: TC

## 2017-03-29 DIAGNOSIS — Z94.4 LIVER TRANSPLANTED: Primary | ICD-10-CM

## 2017-03-29 DIAGNOSIS — Z94.4 LIVER REPLACED BY TRANSPLANT: ICD-10-CM

## 2017-03-29 NOTE — TELEPHONE ENCOUNTER
Called pt to review lab results and medication change needed. Spoke with pt's wife.  Wife verbalized understanding that pt needs to decrease tac to 1mg BID and repeat labs 4/3/17.  Wife also aware that clinic apt is scheduled 4/24 with Dr. Coleman.

## 2017-03-29 NOTE — TELEPHONE ENCOUNTER
----- Message from James Coleman MD sent at 3/28/2017  5:13 PM CDT -----  Results reviewed  Reduce tac to 1/1

## 2017-03-30 RX ORDER — TACROLIMUS 1 MG/1
1 CAPSULE ORAL EVERY 12 HOURS
Qty: 60 CAPSULE | Refills: 11 | Status: SHIPPED | OUTPATIENT
Start: 2017-03-30 | End: 2017-04-17 | Stop reason: SDUPTHER

## 2017-03-31 RX ORDER — LEVOTHYROXINE SODIUM 88 UG/1
TABLET ORAL
Qty: 30 TABLET | Refills: 6 | Status: SHIPPED | OUTPATIENT
Start: 2017-03-31 | End: 2017-12-12 | Stop reason: SDUPTHER

## 2017-04-03 ENCOUNTER — LAB VISIT (OUTPATIENT)
Dept: LAB | Facility: HOSPITAL | Age: 62
End: 2017-04-03
Attending: INTERNAL MEDICINE
Payer: MEDICARE

## 2017-04-03 DIAGNOSIS — Z94.4 LIVER TRANSPLANTED: ICD-10-CM

## 2017-04-03 LAB
ALBUMIN SERPL BCP-MCNC: 3 G/DL
ALP SERPL-CCNC: 276 U/L
ALT SERPL W/O P-5'-P-CCNC: 124 U/L
ANION GAP SERPL CALC-SCNC: 8 MMOL/L
AST SERPL-CCNC: 193 U/L
BASOPHILS # BLD AUTO: 0.01 K/UL
BASOPHILS NFR BLD: 0.1 %
BILIRUB SERPL-MCNC: 1 MG/DL
BUN SERPL-MCNC: 21 MG/DL
CALCIUM SERPL-MCNC: 8.8 MG/DL
CHLORIDE SERPL-SCNC: 103 MMOL/L
CO2 SERPL-SCNC: 26 MMOL/L
CREAT SERPL-MCNC: 1.4 MG/DL
DIFFERENTIAL METHOD: ABNORMAL
EOSINOPHIL # BLD AUTO: 0.3 K/UL
EOSINOPHIL NFR BLD: 3.8 %
ERYTHROCYTE [DISTWIDTH] IN BLOOD BY AUTOMATED COUNT: 14.5 %
EST. GFR  (AFRICAN AMERICAN): >60 ML/MIN/1.73 M^2
EST. GFR  (NON AFRICAN AMERICAN): 54.6 ML/MIN/1.73 M^2
GLUCOSE SERPL-MCNC: 231 MG/DL
HCT VFR BLD AUTO: 41.7 %
HGB BLD-MCNC: 13.5 G/DL
LYMPHOCYTES # BLD AUTO: 2.2 K/UL
LYMPHOCYTES NFR BLD: 31.7 %
MCH RBC QN AUTO: 28.1 PG
MCHC RBC AUTO-ENTMCNC: 32.4 %
MCV RBC AUTO: 87 FL
MONOCYTES # BLD AUTO: 0.6 K/UL
MONOCYTES NFR BLD: 8.2 %
NEUTROPHILS # BLD AUTO: 3.9 K/UL
NEUTROPHILS NFR BLD: 56.2 %
PLATELET # BLD AUTO: 145 K/UL
PMV BLD AUTO: ABNORMAL FL
POTASSIUM SERPL-SCNC: 3.8 MMOL/L
PROT SERPL-MCNC: 6.7 G/DL
RBC # BLD AUTO: 4.8 M/UL
SODIUM SERPL-SCNC: 137 MMOL/L
TACROLIMUS BLD-MCNC: 9.9 NG/ML
WBC # BLD AUTO: 6.91 K/UL

## 2017-04-03 PROCEDURE — 80053 COMPREHEN METABOLIC PANEL: CPT

## 2017-04-03 PROCEDURE — 36415 COLL VENOUS BLD VENIPUNCTURE: CPT | Mod: PO

## 2017-04-03 PROCEDURE — 80197 ASSAY OF TACROLIMUS: CPT

## 2017-04-03 PROCEDURE — 85025 COMPLETE CBC W/AUTO DIFF WBC: CPT

## 2017-04-05 ENCOUNTER — TELEPHONE (OUTPATIENT)
Dept: FAMILY MEDICINE | Facility: CLINIC | Age: 62
End: 2017-04-05

## 2017-04-12 RX ORDER — ESOMEPRAZOLE MAGNESIUM 40 MG/1
40 CAPSULE, DELAYED RELEASE ORAL DAILY
Qty: 90 CAPSULE | Refills: 3 | Status: SHIPPED | OUTPATIENT
Start: 2017-04-12 | End: 2017-05-12

## 2017-04-12 NOTE — TELEPHONE ENCOUNTER
----- Message from Yenny Airam sent at 4/12/2017  9:39 AM CDT -----  Contact: Dai, wife, [phone]  Called in requesting Nexium medication refill sent to pharmacy today and also a call back regarding referral needed for diabetic shoes. Please advise.

## 2017-04-13 ENCOUNTER — TELEPHONE (OUTPATIENT)
Dept: TRANSPLANT | Facility: CLINIC | Age: 62
End: 2017-04-13

## 2017-04-13 NOTE — LETTER
April 13, 2017    Vick Gonzalez  [address] [address]          Dear Vick Gonzalez:  MRN: [id number]    Your liver lab results remain elevated.  There are no medicine changes.  Please have your labs drawn again on 5/1/17.      Clinic appointment scheduled 4/24/17 at 9:00 AM with Dr. Coleman. Please make every effort to make it to the appointment.    If you cannot have your labs drawn on the scheduled date, it is your responsibility to call the transplant department to reschedule.  To reschedule or make an appointment, please as to speak to or leave a message for my assistant, Portia Lincoln, at [phone].  When leaving a message for Latonia Pearce, or myself, we ask that you leave a brief message regarding your request.    Sincerely,    Kandy Herrera, RN, BSN  Liver Transplant Coordinator  [facility]  [address] [place] [id number]  [phone]

## 2017-04-13 NOTE — TELEPHONE ENCOUNTER
LFTs remain elevated. Repeat labs needed monthly per Dr. Coleman. Pt to see MD 4/24/17 to discuss possible re treatment and plan of care.

## 2017-04-17 DIAGNOSIS — Z94.4 LIVER REPLACED BY TRANSPLANT: ICD-10-CM

## 2017-04-17 RX ORDER — TACROLIMUS 1 MG/1
1 CAPSULE ORAL EVERY 12 HOURS
Qty: 60 CAPSULE | Refills: 11 | Status: SHIPPED | OUTPATIENT
Start: 2017-04-17 | End: 2017-09-26 | Stop reason: SDUPTHER

## 2017-04-24 ENCOUNTER — OFFICE VISIT (OUTPATIENT)
Dept: TRANSPLANT | Facility: CLINIC | Age: 62
End: 2017-04-24
Payer: MEDICARE

## 2017-04-24 VITALS
HEART RATE: 70 BPM | HEIGHT: 73 IN | DIASTOLIC BLOOD PRESSURE: 103 MMHG | SYSTOLIC BLOOD PRESSURE: 163 MMHG | RESPIRATION RATE: 16 BRPM | BODY MASS INDEX: 34.89 KG/M2 | TEMPERATURE: 98 F | OXYGEN SATURATION: 99 % | WEIGHT: 263.25 LBS

## 2017-04-24 DIAGNOSIS — I10 ESSENTIAL HYPERTENSION: ICD-10-CM

## 2017-04-24 DIAGNOSIS — Z29.89 NEED FOR PROPHYLACTIC IMMUNOTHERAPY: ICD-10-CM

## 2017-04-24 DIAGNOSIS — E11.10 TYPE 2 DIABETES MELLITUS WITH KETOACIDOSIS WITHOUT COMA, WITHOUT LONG-TERM CURRENT USE OF INSULIN: ICD-10-CM

## 2017-04-24 DIAGNOSIS — Z94.4 LIVER REPLACED BY TRANSPLANT: ICD-10-CM

## 2017-04-24 DIAGNOSIS — B18.2 CHRONIC HEPATITIS C WITHOUT HEPATIC COMA: Primary | ICD-10-CM

## 2017-04-24 DIAGNOSIS — Z98.1 S/P LUMBAR SPINAL FUSION: ICD-10-CM

## 2017-04-24 PROBLEM — E86.9 VOLUME DEPLETION: Status: RESOLVED | Noted: 2017-02-28 | Resolved: 2017-04-24

## 2017-04-24 PROCEDURE — 99999 PR PBB SHADOW E&M-EST. PATIENT-LVL III: CPT | Mod: PBBFAC,,, | Performed by: INTERNAL MEDICINE

## 2017-04-24 PROCEDURE — 99215 OFFICE O/P EST HI 40 MIN: CPT | Mod: S$PBB,,, | Performed by: INTERNAL MEDICINE

## 2017-04-24 PROCEDURE — 99213 OFFICE O/P EST LOW 20 MIN: CPT | Mod: PBBFAC | Performed by: INTERNAL MEDICINE

## 2017-04-24 NOTE — PROGRESS NOTES
Subjective:       Patient ID: Vick Gonzalez is a 61 y.o. male.    Chief Complaint: Liver Transplant Follow-up    HPI  I saw Vick Gonzalez today in the Post-Liver Transplant Clinic. This 61-year-old  gentleman is now almost 7 years post liver transplant for end-stage liver disease due to hepatitis C. He has been doing well post transplant. He feels well with no symptoms of advanced chronic liver   disease. He has slightly elevated serum transaminases and creatinine. The LFTs likely reflect his recurrent HCV.    His last liver bx in Dec 2013 showed stage 1 fibrosis only.    He is maintained on tacrolimus 2/1 mg daily..    Treated for HCV- ledip/sof and ribavirin but relapsed.    Gained weight and BP control less than ideal- BP today 159/93 mmHg. Managed by PCP.  Claims that it is normal at home.    Has also has poorly controlled DM which resulted in hospitalization- Feb 2017.      Lab Results   Component Value Date     (H) 04/03/2017     (H) 04/03/2017    GGT 78 (H) 02/11/2013    ALKPHOS 276 (H) 04/03/2017    BILITOT 1.0 04/03/2017       Review of Systems   Constitutional: Negative for activity change, appetite change, fatigue, fever and unexpected weight change.   HENT: Negative.  Negative for ear pain, hearing loss, sinus pressure and tinnitus.    Eyes: Negative.  Negative for photophobia, discharge, itching and visual disturbance.   Respiratory: Negative.  Negative for cough, chest tightness and shortness of breath.    Cardiovascular: Negative for chest pain, palpitations and leg swelling.   Gastrointestinal: Negative for constipation (colace helping constipation.), diarrhea, nausea and vomiting.   Genitourinary: Negative for decreased urine volume, difficulty urinating, dysuria, frequency (nocturia x 2) and urgency.   Musculoskeletal: Negative.  Negative for arthralgias, back pain, gait problem, joint swelling and myalgias.   Skin: Negative.    Neurological: Negative for dizziness,  seizures, numbness and headaches.   Hematological: Negative for adenopathy. Does not bruise/bleed easily.   Psychiatric/Behavioral: Negative for dysphoric mood and sleep disturbance. The patient is not nervous/anxious.          Objective:      Physical Exam   Constitutional: He is oriented to person, place, and time. He appears well-developed and well-nourished.   HENT:   Head: Normocephalic and atraumatic.   Eyes: Conjunctivae are normal. Pupils are equal, round, and reactive to light.   Neck: Normal range of motion. No thyromegaly present.   Cardiovascular: Normal rate, regular rhythm and normal heart sounds.    No murmur heard.  Pulmonary/Chest: Effort normal and breath sounds normal. He has no wheezes.   Abdominal: Soft. Bowel sounds are normal. He exhibits no distension and no mass. There is no tenderness.   Musculoskeletal: He exhibits no edema.   Lymphadenopathy:     He has no cervical adenopathy.   Neurological: He is alert and oriented to person, place, and time.   Skin: Skin is warm. No rash noted. No erythema.   Psychiatric: He has a normal mood and affect. His behavior is normal.       Assessment:       1. Chronic hepatitis C without hepatic coma    2. Type 2 diabetes mellitus with ketoacidosis without coma, without long-term current use of insulin    3. S/P lumbar spinal fusion    4. Need for prophylactic immunotherapy    5. Liver replaced by transplant    6. Essential hypertension        Plan:   No immunosuppressive changes- recently lowered tac to 1mg BID.  Fibroscan today was unsuccesful  - fibrosure checked  - may need HCV retreatment    Clinic in 6 months.

## 2017-04-24 NOTE — LETTER
April 24, 2017        Emanuel Lopez  [address]  [address]  [place] [address]  Phone: [phone]  Fax: [phone]             [facility]  [address]  [facility] [address]  Phone: [phone]   Patient: Vick Gonzalez   MR Number: [id number]   YOB: 1955   Date of Visit: 4/24/2017       Dear Dr. Emanuel Lopez    Thank you for referring Vick Gonzalez to me for evaluation. Attached you will find relevant portions of my assessment and plan of care.    If you have questions, please do not hesitate to call me. I look forward to following Vick Gonzalez along with you.    Sincerely,    James Coleman MD    Enclosure    If you would like to receive this communication electronically, please contact [email or web address] or [phone] to request Submittable Link access.    Submittable Link is a tool which provides read-only access to select patient information with whom you have a relationship. Its easy to use and provides real time access to review your patients record including encounter summaries, notes, results, and demographic information.    If you feel you have received this communication in error or would no longer like to receive these types of communications, please e-mail [email or web address]

## 2017-04-27 DIAGNOSIS — Z94.4 HISTORY OF LIVER TRANSPLANT: ICD-10-CM

## 2017-04-27 DIAGNOSIS — B18.2 CHRONIC HEPATITIS C WITHOUT HEPATIC COMA: Primary | ICD-10-CM

## 2017-05-01 ENCOUNTER — LAB VISIT (OUTPATIENT)
Dept: LAB | Facility: HOSPITAL | Age: 62
End: 2017-05-01
Attending: INTERNAL MEDICINE
Payer: MEDICARE

## 2017-05-01 ENCOUNTER — TELEPHONE (OUTPATIENT)
Dept: TRANSPLANT | Facility: CLINIC | Age: 62
End: 2017-05-01

## 2017-05-01 DIAGNOSIS — Z94.4 HISTORY OF LIVER TRANSPLANT: ICD-10-CM

## 2017-05-01 DIAGNOSIS — Z94.4 LIVER TRANSPLANTED: ICD-10-CM

## 2017-05-01 DIAGNOSIS — B18.2 CHRONIC HEPATITIS C WITHOUT HEPATIC COMA: ICD-10-CM

## 2017-05-01 LAB
ALBUMIN SERPL BCP-MCNC: 3.1 G/DL
ALP SERPL-CCNC: 268 U/L
ALT SERPL W/O P-5'-P-CCNC: 120 U/L
ANION GAP SERPL CALC-SCNC: 6 MMOL/L
AST SERPL-CCNC: 142 U/L
BASOPHILS # BLD AUTO: 0.01 K/UL
BASOPHILS NFR BLD: 0.1 %
BILIRUB SERPL-MCNC: 0.8 MG/DL
BUN SERPL-MCNC: 18 MG/DL
CALCIUM SERPL-MCNC: 9.2 MG/DL
CHLORIDE SERPL-SCNC: 110 MMOL/L
CO2 SERPL-SCNC: 24 MMOL/L
CREAT SERPL-MCNC: 1.2 MG/DL
DIFFERENTIAL METHOD: ABNORMAL
EOSINOPHIL # BLD AUTO: 0.3 K/UL
EOSINOPHIL NFR BLD: 4.4 %
ERYTHROCYTE [DISTWIDTH] IN BLOOD BY AUTOMATED COUNT: 15.4 %
EST. GFR  (AFRICAN AMERICAN): >60 ML/MIN/1.73 M^2
EST. GFR  (NON AFRICAN AMERICAN): >60 ML/MIN/1.73 M^2
GIANT PLATELETS BLD QL SMEAR: PRESENT
GLUCOSE SERPL-MCNC: 112 MG/DL
HCT VFR BLD AUTO: 40.3 %
HGB BLD-MCNC: 13.5 G/DL
LYMPHOCYTES # BLD AUTO: 2.3 K/UL
LYMPHOCYTES NFR BLD: 29.6 %
MCH RBC QN AUTO: 28.9 PG
MCHC RBC AUTO-ENTMCNC: 33.5 %
MCV RBC AUTO: 86 FL
MONOCYTES # BLD AUTO: 1 K/UL
MONOCYTES NFR BLD: 13.4 %
NEUTROPHILS # BLD AUTO: 4 K/UL
NEUTROPHILS NFR BLD: 52.5 %
PLATELET # BLD AUTO: 141 K/UL
PLATELET BLD QL SMEAR: ABNORMAL
PMV BLD AUTO: ABNORMAL FL
POTASSIUM SERPL-SCNC: 4.1 MMOL/L
PROT SERPL-MCNC: 7.1 G/DL
RBC # BLD AUTO: 4.67 M/UL
SODIUM SERPL-SCNC: 140 MMOL/L
WBC # BLD AUTO: 7.68 K/UL

## 2017-05-01 PROCEDURE — 85025 COMPLETE CBC W/AUTO DIFF WBC: CPT

## 2017-05-01 PROCEDURE — 80053 COMPREHEN METABOLIC PANEL: CPT

## 2017-05-01 PROCEDURE — 36415 COLL VENOUS BLD VENIPUNCTURE: CPT | Mod: PO

## 2017-05-01 PROCEDURE — 80197 ASSAY OF TACROLIMUS: CPT

## 2017-05-01 PROCEDURE — 87902 NFCT AGT GNTYP ALYS HEP C: CPT

## 2017-05-02 ENCOUNTER — TELEPHONE (OUTPATIENT)
Dept: TRANSPLANT | Facility: CLINIC | Age: 62
End: 2017-05-02

## 2017-05-02 LAB — TACROLIMUS BLD-MCNC: 11.7 NG/ML

## 2017-05-03 ENCOUNTER — TELEPHONE (OUTPATIENT)
Dept: TRANSPLANT | Facility: CLINIC | Age: 62
End: 2017-05-03

## 2017-05-03 NOTE — LETTER
May 3, 2017    Vick Gonzalez  [address] [address]          Dear Vick Gonzalez:  MRN: [id number]    Your lab results were stable.  There are no medicine changes.  Please have your labs drawn again on 6/5/17.      If you cannot have your labs drawn on the scheduled date, it is your responsibility to call the transplant department to reschedule.  To reschedule or make an appointment, please as to speak to or leave a message for my assistant, Portia Lincoln, at [phone].  When leaving a message for Latonia Pearce, or myself, we ask that you leave a brief message regarding your request.    Sincerely,    Kandy Herrera, RN, BSN  Liver Transplant Coordinator  [facility]  [address] [place] [address] [phone]

## 2017-05-03 NOTE — TELEPHONE ENCOUNTER
Letter sent, labs remain elevated but stable and no medication changes needed. Repeat labs 6/5/17.

## 2017-05-04 LAB
DACLATASVIR RESISTANCE RESULT1: NORMAL
ELBASVIR RESISTANCE RESULT1: NORMAL
HCV NS3 MUT DET ISLT GENOTYP: NORMAL
LEDIPASVIR RESISTANCE RESULT1: NORMAL
OMBITASVIR RESISTANCE: NORMAL
VELPATASVIR RESISTANCE RESULT1: NORMAL

## 2017-05-12 ENCOUNTER — TELEPHONE (OUTPATIENT)
Dept: HEPATOLOGY | Facility: CLINIC | Age: 62
End: 2017-05-12

## 2017-05-12 RX ORDER — FAMOTIDINE 40 MG/1
40 TABLET, FILM COATED ORAL DAILY
Qty: 30 TABLET | Refills: 2 | Status: SHIPPED | OUTPATIENT
Start: 2017-05-12 | End: 2018-06-13

## 2017-05-12 NOTE — TELEPHONE ENCOUNTER
pls call pt to schedule appt w/ me to discuss HCV treatment    Also, tell him that the medicine we will likely need to use to retreat his HCV will require that he stop his nexium.   Please have him STOP TAKING nexium from now until his visit so we can see how he does with this.    I will send a Rx for famotidine 40mg that he can take ONCE a day instead of the nexium.    thanks

## 2017-05-12 NOTE — TELEPHONE ENCOUNTER
----- Message from James Coleman MD sent at 4/28/2017 10:43 AM CDT -----  Okay- will wait to hear from you    ----- Message -----     From: Jennifer B. Scheuermann, PA     Sent: 4/27/2017   8:42 AM       To: James Coleman MD, Kandy Herrera, RN    Okay. This is where we stand. Let me know what you think...    Pt is post trans, G1b, s/p 12 wks Harv w/ full dose RBV -- RELAPSE      There is zero data avail on Epclusa post trans although it is being studied    There is small amt of data on Epclusa retx of prior NS5A failure - although it's NOT on prior harv failure.   (most of these pts were under treated w/ short course of epclusa in phase 1 or 2 trials).    Upcoming Jefferson and Abbvie regimens for true NS5A failures won't be out til Aug.    To take Epclusa he would have to be able to COMPLETELY D/C his nexium 40mg.    His fibroSURE is pending    I'm going to add NS5A & NS5B resistance testing and will be back in touch.      ----- Message -----     From: James Coleman MD     Sent: 4/13/2017   3:01 PM       To: Jennifer B. Scheuermann, PA, Kandy Herrera, RN    Do you think it's worthwhile reTx this huong with sof/Mark Anthony?    His numbers are high and I'm getting nervous about delaying him more.  G

## 2017-05-14 RX ORDER — METOPROLOL TARTRATE 50 MG/1
TABLET ORAL
Qty: 180 TABLET | Refills: 3 | Status: SHIPPED | OUTPATIENT
Start: 2017-05-14 | End: 2017-10-07 | Stop reason: SDUPTHER

## 2017-05-15 NOTE — TELEPHONE ENCOUNTER
Attempted to speak with pt at both numbers listed. Left message requesting that pt call back to schedule clinic visit. Given direct number.

## 2017-05-19 NOTE — TELEPHONE ENCOUNTER
pls try calling pt again today at both numbers to schedule appt w/ me to discuss HCV tx    If you can not reach someone please let me know so I can notify Dr Coleman and so I can send a letter    Thanks

## 2017-05-22 NOTE — TELEPHONE ENCOUNTER
Two more attempts made to reach pt or his wife (5/19 and 5/22). Left messages with call back number.

## 2017-05-23 NOTE — TELEPHONE ENCOUNTER
Dr Jared URBINA -  My staff has made 4 attempts to reach Mr Gonzalez to schedule an appt to discuss HCV treatment. Numerous messages have been left but pt/wife have not returned calls.  I am sending a letter but wanted to make you aware.  Nery

## 2017-05-31 ENCOUNTER — OFFICE VISIT (OUTPATIENT)
Dept: PAIN MEDICINE | Facility: CLINIC | Age: 62
End: 2017-05-31
Payer: MEDICARE

## 2017-05-31 VITALS
WEIGHT: 266.81 LBS | BODY MASS INDEX: 35.36 KG/M2 | HEIGHT: 73 IN | DIASTOLIC BLOOD PRESSURE: 65 MMHG | TEMPERATURE: 98 F | RESPIRATION RATE: 20 BRPM | HEART RATE: 68 BPM | SYSTOLIC BLOOD PRESSURE: 97 MMHG

## 2017-05-31 DIAGNOSIS — F11.90 CHRONIC, CONTINUOUS USE OF OPIOIDS: ICD-10-CM

## 2017-05-31 DIAGNOSIS — G89.4 CHRONIC PAIN SYNDROME: ICD-10-CM

## 2017-05-31 DIAGNOSIS — Z98.1 S/P LUMBAR SPINAL FUSION: ICD-10-CM

## 2017-05-31 DIAGNOSIS — M96.1 POSTLAMINECTOMY SYNDROME OF LUMBAR REGION: Primary | ICD-10-CM

## 2017-05-31 DIAGNOSIS — M43.10 ACQUIRED SPONDYLOLISTHESIS: ICD-10-CM

## 2017-05-31 DIAGNOSIS — M54.16 LUMBAR RADICULOPATHY: ICD-10-CM

## 2017-05-31 DIAGNOSIS — M54.16 BILATERAL LUMBAR RADICULOPATHY: ICD-10-CM

## 2017-05-31 DIAGNOSIS — G95.9 LUMBAR MYELOPATHY: ICD-10-CM

## 2017-05-31 PROCEDURE — 99214 OFFICE O/P EST MOD 30 MIN: CPT | Mod: S$PBB,,, | Performed by: NURSE PRACTITIONER

## 2017-05-31 PROCEDURE — 99213 OFFICE O/P EST LOW 20 MIN: CPT | Mod: PBBFAC | Performed by: NURSE PRACTITIONER

## 2017-05-31 PROCEDURE — 99999 PR PBB SHADOW E&M-EST. PATIENT-LVL III: CPT | Mod: PBBFAC,,, | Performed by: NURSE PRACTITIONER

## 2017-05-31 PROCEDURE — 80307 DRUG TEST PRSMV CHEM ANLYZR: CPT

## 2017-05-31 RX ORDER — OXYCODONE HYDROCHLORIDE 15 MG/1
15 TABLET ORAL 4 TIMES DAILY PRN
Qty: 120 TABLET | Refills: 0 | Status: SHIPPED | OUTPATIENT
Start: 2017-05-31 | End: 2017-06-30

## 2017-05-31 RX ORDER — OXYCODONE HYDROCHLORIDE 15 MG/1
15 TABLET ORAL 4 TIMES DAILY PRN
Qty: 120 TABLET | Refills: 0 | Status: SHIPPED | OUTPATIENT
Start: 2017-07-28 | End: 2017-08-27

## 2017-05-31 RX ORDER — OXYCODONE HYDROCHLORIDE 15 MG/1
15 TABLET ORAL 4 TIMES DAILY PRN
Qty: 120 TABLET | Refills: 0 | Status: SHIPPED | OUTPATIENT
Start: 2017-06-29 | End: 2017-07-29

## 2017-05-31 RX ORDER — GABAPENTIN 600 MG/1
600 TABLET ORAL 4 TIMES DAILY
Qty: 120 TABLET | Refills: 2 | Status: SHIPPED | OUTPATIENT
Start: 2017-05-31 | End: 2017-08-30

## 2017-05-31 NOTE — PROGRESS NOTES
Chronic patient Established Note (Follow up visit)      SUBJECTIVE:    Vick Gonzalez presents to the clinic for a follow-up appointment for lower back pain.  The pain radiates down the back and side of his right leg to the top of his foot.  He has a history of fusion at L5-S1 and emergent laminectomy for post op fluid collection that was done by Dr. Crabtree on 06/15/15. He has had multiple procedures in the past with limited relief.  He is not interested in further procedures at this time.  He continues to take Roxicodone 15 mg QID PRN and Gabapentin with benefit and without adverse effects.  He denies any bowel or bladder incontinence.  His pain today is 6/10.      Of note, the patient has a history of previous liver transplant and is monitored by hepatology.  His LFTs are chronically elevated which is believed to be due to HCV.    Pain Disability Index Review:  Last 3 PDI Scores 5/31/2017 3/1/2017 11/30/2016   Pain Disability Index (PDI) 27 40 0       Pain Medications:    - Opioids: Roxicodone (Oxycodone/Acetaminophen) 15 mg QID PRN pain    Others: Gabapentin 2400 mg daily    Opioid Contract: yes     report:  Reviewed and consistent with medication use as prescribed.    Pain Procedures:   1/15/15 Right TFESI @ L5 & S1     Physical Therapy/Home Exercise: no    Imaging:     Lumbar MRI 6/16/15  Narrative   Technique: Routine lumbar spine MRI performed without contrast.    Comparison: MRI 06/16/2015, CT 06/15/2015.    Findings:    There are postsurgical changes consistent with L5-S1 posterior instrumented fusion with bilateral pedicular screws, vertical stabilizing rods and an intervertebral disk spacer.  When compared to the MRI dated 06/16/2015 00:33, there are new postsurgical   changes consistent with left L5 hemilaminectomy.  There is a 2.5 cm ill-defined fluid collection at the site of hemilaminectomy that is not well evaluated due to the lack of IV contrast but appears to extend anteriorly into the  spinal canal and into the   left foraminal zone.  There is as possible small fluid collection within the anterior right epidural space that may also due to compression of the adjacent spinal canal.  There is stable grade I anterolistheses of L5 on S1 with persistent high signal   intensity adjacent to the left lateral aspect of the disk spacer that demonstrates moderate associated bone marrow edema of the adjacent vertebral endplates, findings that are when compared to the previous exam.  Note is made that there is an apparent   high signal intensity within the nerve roots posterior to the L5-S1 level that was not definitely present on the previous exam.    There is mild persistent height loss loss involving the L5 vertebral body, likely degenerative in nature.  Remaining vertebral body heights are well-maintained without findings to suggest acute fracture.    There is mild intervertebral disk spacing and desiccation at L4-L5 with a small circumferential disk bulge. No disk protrusion or extrusion. There is moderate bilateral facet arthropathy and mild bilateral uncomfortable and buckling at this level.  These   findings contribute to mild spinal canal stenosis and mild bilateral foraminal narrowing.    Noting aforementioned postsurgical changes, there is persistent severe bilateral foraminal narrowing that is difficult to accurately characterize due to adjacent artifact.    There is edema anterior to the L4, L5 and S1 vertebral bodies, presumably postsurgical in nature.  No drainable fluid collections identified. Visualized retroperitoneal organs are unremarkable.   Impression       Postsurgical changes of L5-S1 posterior spinal fusion and left L5 hemilaminectomy. There is an ill-defined fluid collection at the site of hemilaminectomy that is not well evaluated due to the lack of IV contrast but appears to extend anteriorly with   suspected resulting mass effect on the spinal canal and the left foraminal zone.   There is a suspected small fluid collection within the anterior right epidural space that may contribute to additional mass effect on the adjacent spinal canal. While   findings may represent sequela of expected postsurgical changes, continued follow-up is advised to ensure improvement and/or resolution.    Persistent abnormal high signal intensity adjacent to the left lateral aspect of the intervertebral disk spacer with moderate associated bone marrow edema of the adjacent vertebral endplates. Findings are similar when compared to the previous exam could   represent postsurgical changes. Early infection could have a similar appearance and is possible. Continued follow-up is advised.    Apparent high signal intensity within the nerve roots posterior to the L5-S1 level that was not definitely present on the previous exam. Findings may be artifactual in nature however, sequela of nerve root inflammation/edema is possible noting recent   surgery.    This report has been flagged in the Epic medical record     .  CMP  Sodium   Date Value Ref Range Status   05/01/2017 140 136 - 145 mmol/L Final     Potassium   Date Value Ref Range Status   05/01/2017 4.1 3.5 - 5.1 mmol/L Final     Chloride   Date Value Ref Range Status   05/01/2017 110 95 - 110 mmol/L Final     CO2   Date Value Ref Range Status   05/01/2017 24 23 - 29 mmol/L Final     Glucose   Date Value Ref Range Status   05/01/2017 112 (H) 70 - 110 mg/dL Final     BUN, Bld   Date Value Ref Range Status   05/01/2017 18 6 - 20 mg/dL Final     Creatinine   Date Value Ref Range Status   05/01/2017 1.2 0.5 - 1.4 mg/dL Final     Calcium   Date Value Ref Range Status   05/01/2017 9.2 8.7 - 10.5 mg/dL Final     Total Protein   Date Value Ref Range Status   05/01/2017 7.1 6.0 - 8.4 g/dL Final     Albumin   Date Value Ref Range Status   05/01/2017 3.1 (L) 3.5 - 5.2 g/dL Final     Total Bilirubin   Date Value Ref Range Status   05/01/2017 0.8 0.1 - 1.0 mg/dL Final     Comment:      For infants and newborns, interpretation of results should be based  on gestational age, weight and in agreement with clinical  observations.  Premature Infant recommended reference ranges:  Up to 24 hours.............<8.0 mg/dL  Up to 48 hours............<12.0 mg/dL  3-5 days..................<15.0 mg/dL  6-29 days.................<15.0 mg/dL       Alkaline Phosphatase   Date Value Ref Range Status   05/01/2017 268 (H) 55 - 135 U/L Final     AST   Date Value Ref Range Status   05/01/2017 142 (H) 10 - 40 U/L Final     ALT   Date Value Ref Range Status   05/01/2017 120 (H) 10 - 44 U/L Final     Anion Gap   Date Value Ref Range Status   05/01/2017 6 (L) 8 - 16 mmol/L Final     eGFR if    Date Value Ref Range Status   05/01/2017 >60.0 >60 mL/min/1.73 m^2 Final     eGFR if non    Date Value Ref Range Status   05/01/2017 >60.0 >60 mL/min/1.73 m^2 Final     Comment:     Calculation used to obtain the estimated glomerular filtration  rate (eGFR) is the CKD-EPI equation. Since race is unknown   in our information system, the eGFR values for   -American and Non--American patients are given   for each creatinine result.           Allergies:   Review of patient's allergies indicates:   Allergen Reactions    Naproxen      Other reaction(s): problems w/liver/kid    Oxaprozin      Other reaction(s): cause problems w/kid/liver       Current Medications:   Current Outpatient Prescriptions   Medication Sig Dispense Refill    allopurinol (ZYLOPRIM) 100 MG tablet Take 1 tablet (100 mg total) by mouth 2 (two) times daily. 60 tablet 11    amitriptyline (ELAVIL) 25 MG tablet Take 1 tablet (25 mg total) by mouth every evening. For nerve pain and sleep 30 tablet 11    blood sugar diagnostic Strp 1 strip by Misc.(Non-Drug; Combo Route) route 4 (four) times daily before meals and nightly. 100 strip 11    blood-glucose meter Misc 1 each by Misc.(Non-Drug; Combo Route) route 4 (four)  "times daily before meals and nightly. 1 each 0    calcium carbonate-vitamin D3 (CALTRATE 600 + D) 600 mg(1,500mg) -400 unit Chew       famotidine (PEPCID) 40 MG tablet Take 1 tablet (40 mg total) by mouth once daily. 30 tablet 2    gabapentin (NEURONTIN) 600 MG tablet Take 1 tablet (600 mg total) by mouth 4 (four) times daily. 120 tablet 2    insulin aspart (NOVOLOG) 100 unit/mL InPn pen Inject 12 Units into the skin 3 (three) times daily with meals. 10.8 mL 11    insulin detemir (LEVEMIR FLEXTOUCH) 100 unit/mL (3 mL) SubQ InPn pen Inject 55 Units into the skin every evening. 15 mL 11    lancets Misc 1 each by Misc.(Non-Drug; Combo Route) route 4 (four) times daily before meals and nightly. 200 each 11    lancing device Misc 1 each by Misc.(Non-Drug; Combo Route) route 4 (four) times daily before meals and nightly. 1 each 0    levothyroxine (SYNTHROID) 88 MCG tablet TAKE 1 TABLET (88 MCG TOTAL) BY MOUTH ONCE DAILY. 30 tablet 6    lisinopril 10 MG tablet Take 1 tablet (10 mg total) by mouth once daily. 90 tablet 2    metoprolol tartrate (LOPRESSOR) 50 MG tablet TAKE 3 TABLETS BY MOUTH 2 (TWO) TIMES DAILY. 180 tablet 3    multivitamin (THERAGRAN) per tablet Take 1 tablet by mouth.      nifedipine (ADALAT CC) 60 MG TbSR Take 1 tablet (60 mg total) by mouth once daily. 90 tablet 11    oxycodone (ROXICODONE) 15 MG Tab Take 1 tablet (15 mg total) by mouth 4 (four) times daily as needed for Pain. 120 tablet 0    pen needle, diabetic (BD ULTRA-FINE MENDY PEN NEEDLES) 32 gauge x 5/32" Ndle Use with aspart TIDWM and with detemir  each 12    sertraline (ZOLOFT) 100 MG tablet Take 1 tablet (100 mg total) by mouth once daily. 30 tablet 11    sildenafil (VIAGRA) 100 MG tablet Take 1 tablet (100 mg total) by mouth daily as needed for Erectile Dysfunction. 30 tablet 11    tacrolimus (PROGRAF) 1 MG Cap Take 1 capsule (1 mg total) by mouth every 12 (twelve) hours. Liver Transplant Z94.4 60 capsule 11     No " current facility-administered medications for this visit.        REVIEW OF SYSTEMS:    GENERAL:  No weight loss, malaise or fevers.  HEENT:  Negative for frequent or significant headaches.  NECK:  Negative for lumps, goiter, pain and significant neck swelling.  RESPIRATORY:  Negative for cough, wheezing or shortness of breath.  CARDIOVASCULAR:  Negative for chest pain, leg swelling or palpitations. H/O hypertension.  GI:  Negative for abdominal discomfort, blood in stools or black stools or change in bowel habits.  MUSCULOSKELETAL:  See HPI.  SKIN:  Negative for lesions, rash, and itching.  PSYCH:  Negative for sleep disturbance, mood disorder and recent psychosocial stressors.  HEMATOLOGY/LYMPHOLOGY:  Negative for prolonged bleeding, bruising easily or swollen nodes. H/O liver transplant.  NEURO:   No history of headaches, syncope, paralysis, seizures or tremors.  ENDO: Diabetes.   All other reviewed and negative other than HPI.    Past Medical History:  Past Medical History:   Diagnosis Date    Anemia     Anxiety     Chronic hepatitis C virus infection - RELAPSE following harvoni + RBV 8/23/2011    Completed 12 weeks Harvoni + RBV w/ RELAPSE 6 weeks out (3/2016)  Wk 1 Hgb 12.4, prograf 6.8 Wk 2 Hgb 12.7 Wk 4 Hgb 12.8, prograf 5.4; HCV RNA 47. INCREASE RBV to 600 Wk 5 Hgb 12.6 Wk 6 Hgb 12.4, HCV RNA <12, detected. INCREASE  Wk 7 Hgb 11.8 Wk 8 Hgb 11.7, prograf 4.0. INCREASE RBV 1000, INCREASE PROGRAF 2/1 Wk 9 Hgb 12.4, prograf 3.6  HCV RNA <12, detected. INCREASE PROGRAF 2/2 Wk 10 hgb     Chronic pain syndrome 7/13/2011    Diabetes mellitus type II, uncontrolled     Discitis of lumbosacral region 1/16/2015    ED (erectile dysfunction)     Genital herpes     Gout, arthritis     History of alcohol abuse     History of positive PPD, treatment status unknown     Pulmonary granulomas, negative sputum cultures for AFB and indeterminate quantferon test    History of substance abuse     Hypertension   "   Hypothyroidism     Peptic ulcer disease        Past Surgical History:  Past Surgical History:   Procedure Laterality Date    CHOLECYSTECTOMY      LIVER TRANSPLANT  06/2010    SPINE SURGERY         Family History:  Family History   Problem Relation Age of Onset    Cancer Mother     Diabetes Mother     Heart disease Mother     Diabetes Sister     Cancer Maternal Uncle 82     colon CA    Melanoma Neg Hx     Psoriasis Neg Hx     Lupus Neg Hx     Eczema Neg Hx     Acne Neg Hx        Social History:  Social History     Social History    Marital status:      Spouse name: N/A    Number of children: N/A    Years of education: N/A     Social History Main Topics    Smoking status: Former Smoker    Smokeless tobacco: Never Used    Alcohol use No      Comment: over 5 years ago, none currently    Drug use: No    Sexual activity: Not on file     Other Topics Concern    Not on file     Social History Narrative    No narrative on file       OBJECTIVE:    BP 97/65   Pulse 68   Temp 97.8 °F (36.6 °C) (Oral)   Resp 20   Ht 6' 1" (1.854 m)   Wt 121 kg (266 lb 12.8 oz)   BMI 35.20 kg/m²     PHYSICAL EXAMINATION:    General appearance: Well appearing, in no acute distress, alert and oriented x3.  Psych:  Mood and affect appropriate.  Skin: Skin color, texture, turgor normal, no rashes or lesions, in both upper and lower body.  Head/face:  Atraumatic, normocephalic. No palpable lymph node  Cor: RRR  Pulm: CTA  GI: Abdomen soft and non-tender.  Back: Straight leg raising in the sitting and supine positions is negative to radicular pain. Limited lumbar flexion and extension with pain reproduction.  Positive facet loading bilaterally.  Painful palpation to bilateral SI joints.  Positive HUBER bilaterally.  Extremities: Peripheral joint ROM is full and pain free without obvious instability or laxity in all four extremities. No deformities, edema, or skin discoloration. Good capillary " refill.  Musculoskeletal:  Right plantar flexion 4/5.  All other LE strength 5/5 and symmetric.  No atrophy or tone abnormalities are noted.  Neuro: Bilateral upper and lower extremity coordination and muscle stretch reflexes are physiologic and symmetric.  Plantar response are downgoing.  Decreased sensation to anterior aspect of right foot.   Gait: Antalgic.    ASSESSMENT: 61 y.o. year old male with lower back and right leg pain, consistent with the following diagnoses:     1. Postlaminectomy syndrome of lumbar region     2. Chronic, continuous use of opioids     3. Lumbar radiculopathy     4. Bilateral lumbar radiculopathy     5. Lumbar myelopathy     6. Acquired spondylolisthesis           PLAN:     - Previous imaging was reviewed and discussed with the patient today. Recent labs reviewed with patient today.     - Patient can continue Roxicodone 15 mg QID PRN pain, #120. 3 months of prescriptions with appropriate dates was supplied today.     - The [place] Board of Pharmacy website for prescription monitoring was consulted today, and it does not suggest any deviations in conflict with the patient's controlled substance contract with our clinic. Will continue current therapy with frequent monitoring of the controlled substance database, and urine drug screens on followup. Refill approved.  Medication management by Dr. Penn.    - Continue Gabapentin 600 mg 2 tablets BID. Refill provided today.      - Last UDS from 11/30/16 was reviewed. UDS was performed today.  Preliminary results are consistent with medications as prescribed and negative for illicit substances.    - RTC in 3 months or sooner if needed.    - Counseled patient regarding the importance of constant sleeping habits and physical therapy.  The patient will continue a home exercise routine to help with pain and strengthening.      - Dr. Penn was consulted on the patient and agrees with this plan.      The above plan and management options were  discussed at length with patient. Patient is in agreement with the above and verbalized understanding.    Masha Holguin  05/31/2017

## 2017-06-06 LAB
6MAM UR QL: NOT DETECTED
7AMINOCLONAZEPAM UR QL: NOT DETECTED
A-OH ALPRAZ UR QL: NOT DETECTED
ALPRAZ UR QL: NOT DETECTED
AMPHET UR QL SCN: NOT DETECTED
ANNOTATION COMMENT IMP: NORMAL
ANNOTATION COMMENT IMP: NORMAL
BARBITURATES UR QL: NOT DETECTED
BUPRENORPHINE UR QL: NOT DETECTED
BZE UR QL: NOT DETECTED
CARBOXYTHC UR QL: NOT DETECTED
CARISOPRODOL UR QL: NOT DETECTED
CLONAZEPAM UR QL: NOT DETECTED
CODEINE UR QL: NOT DETECTED
CREAT UR-MCNC: 189.6 MG/DL (ref 20–400)
DIAZEPAM UR QL: NOT DETECTED
ETHYL GLUCURONIDE UR QL: NOT DETECTED
FENTANYL UR QL: NOT DETECTED
HYDROCODONE UR QL: NOT DETECTED
HYDROMORPHONE UR QL: NOT DETECTED
LORAZEPAM UR QL: NOT DETECTED
MDA UR QL: NOT DETECTED
MDEA UR QL: NOT DETECTED
MDMA UR QL: NOT DETECTED
ME-PHENIDATE UR QL: NOT DETECTED
MEPERIDINE UR QL: NOT DETECTED
METHADONE UR QL: NOT DETECTED
METHAMPHET UR QL: NOT DETECTED
MIDAZOLAM UR QL SCN: NOT DETECTED
MORPHINE UR QL: NOT DETECTED
NORBUPRENORPHINE UR QL CFM: NOT DETECTED
NORDIAZEPAM UR QL: NOT DETECTED
NORFENTANYL UR QL: NOT DETECTED
NORHYDROCODONE UR QL CFM: NOT DETECTED
NOROXYCODONE UR QL CFM: PRESENT
OXAZEPAM UR QL: PRESENT
OXYCODONE UR QL: PRESENT
OXYMORPHONE UR QL: PRESENT
OXYMORPHONE UR QL: PRESENT
PATHOLOGY STUDY: NORMAL
PCP UR QL: NOT DETECTED
PHENTERMINE UR QL: NOT DETECTED
PROPOXYPH UR QL: NOT DETECTED
SERVICE CMNT-IMP: NORMAL
TAPENTADOL UR QL SCN: NOT DETECTED
TAPENTADOL UR QL SCN: NOT DETECTED
TEMAZEPAM UR QL: NOT DETECTED
TRAMADOL UR QL: NOT DETECTED
ZOLPIDEM UR QL: NOT DETECTED

## 2017-06-07 ENCOUNTER — LAB VISIT (OUTPATIENT)
Dept: LAB | Facility: HOSPITAL | Age: 62
End: 2017-06-07
Attending: INTERNAL MEDICINE
Payer: MEDICARE

## 2017-06-07 DIAGNOSIS — Z94.4 LIVER TRANSPLANTED: ICD-10-CM

## 2017-06-07 LAB
ALBUMIN SERPL BCP-MCNC: 3.2 G/DL
ALP SERPL-CCNC: 197 U/L
ALT SERPL W/O P-5'-P-CCNC: 110 U/L
ANION GAP SERPL CALC-SCNC: 10 MMOL/L
AST SERPL-CCNC: 121 U/L
BASOPHILS # BLD AUTO: 0.01 K/UL
BASOPHILS NFR BLD: 0.1 %
BILIRUB SERPL-MCNC: 0.4 MG/DL
BUN SERPL-MCNC: 17 MG/DL
CALCIUM SERPL-MCNC: 9.3 MG/DL
CHLORIDE SERPL-SCNC: 109 MMOL/L
CO2 SERPL-SCNC: 22 MMOL/L
CREAT SERPL-MCNC: 1.2 MG/DL
DIFFERENTIAL METHOD: ABNORMAL
EOSINOPHIL # BLD AUTO: 0.3 K/UL
EOSINOPHIL NFR BLD: 4.5 %
ERYTHROCYTE [DISTWIDTH] IN BLOOD BY AUTOMATED COUNT: 14.3 %
EST. GFR  (AFRICAN AMERICAN): >60 ML/MIN/1.73 M^2
EST. GFR  (NON AFRICAN AMERICAN): >60 ML/MIN/1.73 M^2
GIANT PLATELETS BLD QL SMEAR: PRESENT
GLUCOSE SERPL-MCNC: 224 MG/DL
HCT VFR BLD AUTO: 42 %
HGB BLD-MCNC: 14.1 G/DL
LYMPHOCYTES # BLD AUTO: 2.4 K/UL
LYMPHOCYTES NFR BLD: 33.1 %
MCH RBC QN AUTO: 29.4 PG
MCHC RBC AUTO-ENTMCNC: 33.6 %
MCV RBC AUTO: 88 FL
MONOCYTES # BLD AUTO: 0.7 K/UL
MONOCYTES NFR BLD: 9.9 %
NEUTROPHILS # BLD AUTO: 3.8 K/UL
NEUTROPHILS NFR BLD: 52.4 %
PLATELET # BLD AUTO: 125 K/UL
PMV BLD AUTO: ABNORMAL FL
POTASSIUM SERPL-SCNC: 4.3 MMOL/L
PROT SERPL-MCNC: 7.2 G/DL
RBC # BLD AUTO: 4.8 M/UL
SODIUM SERPL-SCNC: 141 MMOL/L
WBC # BLD AUTO: 7.26 K/UL

## 2017-06-07 PROCEDURE — 80197 ASSAY OF TACROLIMUS: CPT

## 2017-06-07 PROCEDURE — 85025 COMPLETE CBC W/AUTO DIFF WBC: CPT

## 2017-06-07 PROCEDURE — 36415 COLL VENOUS BLD VENIPUNCTURE: CPT | Mod: PO

## 2017-06-07 PROCEDURE — 80053 COMPREHEN METABOLIC PANEL: CPT

## 2017-06-08 ENCOUNTER — OFFICE VISIT (OUTPATIENT)
Dept: HEPATOLOGY | Facility: CLINIC | Age: 62
End: 2017-06-08
Payer: MEDICARE

## 2017-06-08 ENCOUNTER — TELEPHONE (OUTPATIENT)
Dept: PHARMACY | Facility: CLINIC | Age: 62
End: 2017-06-08

## 2017-06-08 VITALS
HEIGHT: 74 IN | DIASTOLIC BLOOD PRESSURE: 77 MMHG | HEART RATE: 73 BPM | WEIGHT: 268.5 LBS | OXYGEN SATURATION: 95 % | BODY MASS INDEX: 34.46 KG/M2 | TEMPERATURE: 98 F | SYSTOLIC BLOOD PRESSURE: 113 MMHG

## 2017-06-08 DIAGNOSIS — K74.60 CIRRHOSIS OF LIVER WITHOUT ASCITES, UNSPECIFIED HEPATIC CIRRHOSIS TYPE: ICD-10-CM

## 2017-06-08 DIAGNOSIS — Z94.4 HISTORY OF LIVER TRANSPLANT: ICD-10-CM

## 2017-06-08 DIAGNOSIS — B18.2 CHRONIC HEPATITIS C WITHOUT HEPATIC COMA: Primary | ICD-10-CM

## 2017-06-08 DIAGNOSIS — Z79.60 LONG-TERM USE OF IMMUNOSUPPRESSANT MEDICATION: ICD-10-CM

## 2017-06-08 LAB — TACROLIMUS BLD-MCNC: 4.9 NG/ML

## 2017-06-08 PROCEDURE — 99214 OFFICE O/P EST MOD 30 MIN: CPT | Mod: PBBFAC | Performed by: PHYSICIAN ASSISTANT

## 2017-06-08 PROCEDURE — 99214 OFFICE O/P EST MOD 30 MIN: CPT | Mod: S$PBB,,, | Performed by: PHYSICIAN ASSISTANT

## 2017-06-08 PROCEDURE — 99999 PR PBB SHADOW E&M-EST. PATIENT-LVL IV: CPT | Mod: PBBFAC,,, | Performed by: PHYSICIAN ASSISTANT

## 2017-06-08 RX ORDER — RIBAVIRIN 200 MG/1
TABLET, FILM COATED ORAL
Qty: 112 TABLET | Refills: 5 | Status: SHIPPED | OUTPATIENT
Start: 2017-06-08 | End: 2017-12-07 | Stop reason: ALTCHOICE

## 2017-06-08 RX ORDER — VELPATASVIR AND SOFOSBUVIR 100; 400 MG/1; MG/1
1 TABLET, FILM COATED ORAL DAILY
Qty: 28 TABLET | Refills: 5 | Status: SHIPPED | OUTPATIENT
Start: 2017-06-08 | End: 2017-12-07 | Stop reason: ALTCHOICE

## 2017-06-08 NOTE — LETTER
June 8, 2017      James Coleman MD  [address]  [facility] [address]           [facility] - Hepatology  [address]  [facility] [address]  Phone: [phone]  Fax: [phone]          Patient: Vick Gonzalez   MR Number: [id number]   YOB: 1955   Date of Visit: 6/8/2017       Dear Dr. James Coleman:    Thank you for referring Vick Gonzalez to me for evaluation. Attached you will find relevant portions of my assessment and plan of care.    If you have questions, please do not hesitate to call me. I look forward to following Vick Gonzalez along with you.    Sincerely,    Jennifer B. Scheuermann, PA    Enclosure  CC:  No Recipients    If you would like to receive this communication electronically, please contact [email or web address] or [phone] to request more information on [employer] Link access.    For providers and/or their staff who would like to refer a patient to [facility], please contact us through our one-stop-shop provider referral line, [facility], at [phone].    If you feel you have received this communication in error or would no longer like to receive these types of communications, please e-mail [email or web address]

## 2017-06-08 NOTE — Clinical Note
Just FYI - FibroSURE suggests F4. Last imaging was 10/2016 and no recent AFP. Bringing this up in case you wanted to institute HCC screening on him.

## 2017-06-08 NOTE — PROGRESS NOTES
HEPATOLOGY CLINIC VISIT NOTE - HCV Clinic    REFERRING PROVIDER: James Coleman MD    CHIEF COMPLAINT: Hepatitis C - discuss treatment  (here with wife)    HISTORY: This is a 61 y.o. Black male who is well known to me. He underwent liver transplant in 2009 for HCV Cirrhosis. In 2015 he was treated w/ Harvoni + Ribavirin for HCV recurrence but unfortunately relapsed. He has since developed aggressive recurrence w/ recent testing indicating progression to cirrhosis in his transplanted liver. He returns today to discuss urgent retreatment of his HCV.    HCV history:  - Genotype 1B   (documented 2008 (pretransplant), 2015 (post transplant / pre treatment), and 2017 (post relapse)  - Treatment naive prior to Harvoni    Prior HCV tx:  Harvoni + Ribavirin x 12 weeks (10/2015 - 1/2016)  Tolerated Ribavirin well with dose escalation from 600mg up to 1000mg daily   - week 4 HCV RNA - 47 IU/mL   - week 6 HCV RNA <12, detected   - week 9 HCV RNA <12, detected   - weeks 10 & 12 HCV RNA not detected   - RELAPSE 6 weeks post treatment w/ HCV RNA > 2 million     NS5A resistance testing 5/2017 - L31 mutation  (however there is little data on the significance of this polymorphism in genotype 1b disease)    Liver staging:  - Liver biopsy 12/2013 - Grade 1, Stage 1 fibrosis  - HCV FibroSURE 4/2017 - A3, F4  - FibroScan - attempted 4/2017 - unsuccessful    Labs support fibrosure findings of advanced fibrosis in transplanted liver:  - plt count has recently dropped to 120s-140s.  - Transaminases are notably elevated w/ AST>ALT (AST 120s-190s, ALT 100s-120s)     Other:  Pt had been on nexium daily. This was recently held in anticipation of Epclusa therapy.  Pepcid 40mg daily was prescribed but pt did not  this Rx so he has been taking NO acid suppression for > 1 week.  Reports no reflux, GERD, dyspeptic symptoms    Pt feels well  Motivated to have HCV retreated.                    Past Medical History:   Diagnosis Date     Anemia     Anxiety     Chronic hepatitis C virus infection - RELAPSE following harvoni + RBV 8/23/2011    Completed 12 weeks Harvoni + RBV w/ RELAPSE 6 weeks out (3/2016)  Wk 1 Hgb 12.4, prograf 6.8 Wk 2 Hgb 12.7 Wk 4 Hgb 12.8, prograf 5.4; HCV RNA 47. INCREASE RBV to 600 Wk 5 Hgb 12.6 Wk 6 Hgb 12.4, HCV RNA <12, detected. INCREASE  Wk 7 Hgb 11.8 Wk 8 Hgb 11.7, prograf 4.0. INCREASE RBV 1000, INCREASE PROGRAF 2/1 Wk 9 Hgb 12.4, prograf 3.6  HCV RNA <12, detected. INCREASE PROGRAF 2/2 Wk 10 hgb     Chronic pain syndrome 7/13/2011    Diabetes mellitus type II, uncontrolled     Discitis of lumbosacral region 1/16/2015    ED (erectile dysfunction)     Genital herpes     Gout, arthritis     History of alcohol abuse     History of positive PPD, treatment status unknown     Pulmonary granulomas, negative sputum cultures for AFB and indeterminate quantferon test    History of substance abuse     Hypertension     Hypothyroidism     Peptic ulcer disease        Past Surgical History:   Procedure Laterality Date    CHOLECYSTECTOMY      LIVER TRANSPLANT  06/2010    SPINE SURGERY         FAMILY HISTORY: Negative for liver disease    SOCIAL HISTORY:   History   Smoking Status    Former Smoker   Smokeless Tobacco    Never Used      Alcohol - none  Drugs - none    ROS:   No fever, chills, weight loss, fatigue  No chest pain, palpitations, dyspnea, cough  No abdominal pain, change in bowel pattern, nausea, vomiting, GERD.   No dysuria, hematuria   No skin rashes   (+) back pain  No headaches  No depression or anxiety      PHYSICAL EXAM:  Friendly Black male, in no acute distress; alert and oriented to person, place and time.   VITALS: reviewed  HEENT: Sclerae anicteric.   NECK: Supple  LUNGS: Normal respiratory effort   SKIN: Warm and dry. No jaundice, No obvious rashes.   NEURO/PSYCH: Normal gate. Memory intact. Thought and speech pattern appropriate. Behavior normal. No depression or anxiety  noted.      RECENT LABS:  Lab Results   Component Value Date    WBC 7.26 06/07/2017    HGB 14.1 06/07/2017     (L) 06/07/2017    INR 1.0 02/27/2017     Lab Results   Component Value Date     (H) 06/07/2017     (H) 06/07/2017    BILITOT 0.4 06/07/2017    ALBUMIN 3.2 (L) 06/07/2017    ALKPHOS 197 (H) 06/07/2017    CREATININE 1.2 06/07/2017    BUN 17 06/07/2017     06/07/2017    K 4.3 06/07/2017    TACROLIMUS 4.9 (L) 06/07/2017       ASSESSMENT  61 y.o. Black male who underwent Liver Transplant in 2009 for HCV Cirrhosis. Has documented aggressive HCV recurrence with progression to cirrhosis in transplanted liver. He needs urgent HCV retreatment to prevent loss of graft and need for retransplant    1. CHRONIC HEPATITIS C, GENOTYPE 1b - recurrence post transplant  -- s/p 12 weeks Harvoni + RBV w/ RELAPSE  -- significant transaminase elevation  2. CIRRHOSIS IN TRANSPLANTED LIVER  -- HCV FibroSURE 4/2017 - F4  (FibroScan - attempted 4/2017 - unsuccessful)  -- Liver biopsy 12/2013 - Grade 1, Stage 1 fibrosis  3. S/P LIVER TRANSPLANT - 2009  4. LONGTERM IMMUNOSUPPRESSION  -- on prograf        EDUCATION:  Discussed goal of HCV eradication to prevent progression of liver disease.  Discussed use of Epclusa + Ribavirin x 24 weeks w/ potential side effects of fatigue and headache, anemia, nausea, insomnia.   Will require close prograf monitoring during therapy although DDIs between Epclusa and prograf are not anticipated.  Potential teratogenic effects of RBV reviewed. Two methods birth control needed while on therapy and for following 6 months.     Reviewed limitations on acid suppressant medications due to DDI w/ Epclusa:  -- Antacids - must be  from Epclusa by 4 hours.   -- H2 Receptor Antagonist - Pt not taking. Can take pepcid 40mg once daily dosed at same time as Epclusa  -- PPI - Previously on nexium but doing well off of it.  Patient instructed to contact me if experiencing additional  acid related symptoms so further recommendations can be made regarding acid suppression therapy.      Herbal / alternative therapies must be avoided      PLAN:  1. Obtain authorization to treat HCV with Epclusa + Ribavirin daily x 24 weeks  -- Rx will be routed to [facility]  -- Patient will notify me of exact treatment start date so appropriate lab f/u can be scheduled.  -- Will initiate ribavirin at 600mg daily and adjust as indicated  2. Remain off nexium. Reviewed that he can take Pepcid and Tums per above dosing rules

## 2017-06-09 ENCOUNTER — TELEPHONE (OUTPATIENT)
Dept: TRANSPLANT | Facility: CLINIC | Age: 62
End: 2017-06-09

## 2017-06-09 DIAGNOSIS — K74.60 HEPATIC CIRRHOSIS, UNSPECIFIED HEPATIC CIRRHOSIS TYPE: ICD-10-CM

## 2017-06-09 DIAGNOSIS — Z94.4 LIVER TRANSPLANTED: Primary | ICD-10-CM

## 2017-06-09 NOTE — TELEPHONE ENCOUNTER
----- Message from James Coleman MD sent at 6/9/2017  4:01 PM CDT -----  Yes please    ----- Message -----  From: Kandy Herrera, RN  Sent: 6/9/2017   9:09 AM  To: James Coleman MD    Meaning AFP and u/s q6m?    ----- Message -----  From: James Coleman MD  Sent: 6/9/2017   2:32 AM  To: Kandy Herrera RN    Can we start 6 monthly HCC screening on him please?      ----- Message -----  From: Jennifer B. Scheuermann, PA  Sent: 6/8/2017  11:14 AM  To: James Coleman MD    Just FYI - FibroSURE suggests F4. Last imaging was 10/2016 and no recent AFP. Bringing this up in case you wanted to institute HCC screening on him.

## 2017-06-09 NOTE — LETTER
June 9, 2017    Vick Gonzalez  [address] [facility] [address]          Dear Vick Gonzalez:  MRN: [id number]    Your lab results were stable.  There are no medicine changes.  Please have your labs drawn again on 7/3/17 (unless the Hep C clinic requests sooner).      If you cannot have your labs drawn on the scheduled date, it is your responsibility to call the transplant department to reschedule.  To reschedule or make an appointment, please as to speak to or leave a message for my assistant, Portia Lincoln, at [phone].  When leaving a message for Latonia Pearce, or myself, we ask that you leave a brief message regarding your request.    Sincerely,    Kandy Herrera, RN, BSN  Liver Transplant Coordinator  [facility]  [address] [place] [id number] [phone]

## 2017-06-09 NOTE — TELEPHONE ENCOUNTER
Letter sent, labs stable and no medication changes needed. Pt seen in HCV clinic this week.  Repeat labs due 7/3/17, unless he starts Hep C treatment.

## 2017-06-14 ENCOUNTER — TELEPHONE (OUTPATIENT)
Dept: PAIN MEDICINE | Facility: CLINIC | Age: 62
End: 2017-06-14

## 2017-06-14 NOTE — TELEPHONE ENCOUNTER
"Contacted and spoke to patient wife, she stated " the prescriptions are wrong. Staff informed her that the system is showing three pre dated prescription was given to patient at his office visit.     She stated she may have to have him bring them in so we can check the dates.     Staff informed her that would be the best option.   "

## 2017-06-14 NOTE — TELEPHONE ENCOUNTER
----- Message from Violet Pablo sent at 6/14/2017 12:24 PM CDT -----  _X  1st Request  _  2nd Request  _  3rd Request        Who: Cely    Why: Please call pt regarding rx.    What Number to Call Back:[phone]    When to Expect a call back: (Before the end of the day)   -- if the call is after 12:00, the call back will be tomorrow.

## 2017-06-15 ENCOUNTER — TELEPHONE (OUTPATIENT)
Dept: PHARMACY | Facility: CLINIC | Age: 62
End: 2017-06-15

## 2017-06-15 NOTE — TELEPHONE ENCOUNTER
Patient returned phone call back regarding specialty medication for Epclusa & Ribavirin. He also give permission to speak with his wife Cely regarding his medication orders. She scheduled for a phone consultation on Mon 06/19 @10am and ship medication out the same day along with the invoice for the payment of the co-pay of the medication. She voiced understanding.

## 2017-06-15 NOTE — TELEPHONE ENCOUNTER
FOR DOCUMENTATION ONLY   Epclusa & Ribavirin prior authorization approved x 24 weeks.  Approval date: 3/15/17 through 6/13/18   Co-pay: $8.25     Ribavirin does not require prior authorization.   Co-pay: $3.08

## 2017-06-16 ENCOUNTER — EPISODE CHANGES (OUTPATIENT)
Dept: HEPATOLOGY | Facility: CLINIC | Age: 62
End: 2017-06-16

## 2017-06-19 ENCOUNTER — TELEPHONE (OUTPATIENT)
Dept: HEPATOLOGY | Facility: CLINIC | Age: 62
End: 2017-06-19

## 2017-06-19 DIAGNOSIS — Z79.60 LONG-TERM USE OF IMMUNOSUPPRESSANT MEDICATION: ICD-10-CM

## 2017-06-19 DIAGNOSIS — Z94.4 HISTORY OF LIVER TRANSPLANT: Primary | ICD-10-CM

## 2017-06-19 DIAGNOSIS — B18.2 CHRONIC HEPATITIS C WITHOUT HEPATIC COMA: ICD-10-CM

## 2017-06-19 NOTE — TELEPHONE ENCOUNTER
Initial Epclusa/RBV consult completed on 17. Epclusa will be shipped on 17 to arrive at patient's home on 17 via [employer].  Patient will start Epclusa/RBV on 17. Address confirmed, CC on file. Confirmed 2 patient identifiers - name and . Therapy Appropriate.     Epclusa 400/100mg- Take one tablet by mouth daily x 24 weeks  Counseling was reviewed:   1. Patient MUST take Epclusa at the SAME time every day.   2. Patient MUST avoid acid reducers without consulting with myself or provider first. Antacids are to be spaced out at least 4 hours apart from Epclusa.  Famotidine is to be taken AT THE SAME TIME as Epclusa. (patient has not picked up medication as of 17 - has not needed medication)   3. Potential Side effects include: nausea, headaches, insomnia and fatigue.   Headache: Patient may treat with OTC remedies. If Tylenol is used, dose should not exceed 2000mg per day.    4. Medication list reviewed. No DDIs or allergies noted. Patient MUST contact myself or provider prior to starting any new OTC, herbal, or prescription drugs to avoid potential DDIs    .Ribavirin 200mg - take 3 tablets once daily to start, may increase to 2 tablet BID as tolerated WITH FOOD x 24 weeks.  Discussed possible adverse events including: Nausea - usually lessened with food. Anemia - monitored with labs by provider. Diarrhea, insomnia, loss of appetite, hair loss, dry mouth, muscle or joint pain.   Skin rash: Monitor closely. May use provided hydrocortisone cream if rash develops, notify me if excessive itching and/or welts. Eucerin Cream for potential dry skin.  Use is contraindicated in pregnant women or male partners of pregnant women. Avoid pregnancy in female patients and female partners of male patients during therapy by using two effective forms of contraception; continue contraceptive measures for at least 6 months after completion of therapy.       DDI: Medication list reviewed and potential DDIs  addressed.    Discussed the importance of staying well hydrated while on therapy. Compliance stressed - patient to take missed doses as soon as remembered, but NOT to take 2 doses in one day. Patient will report questions or concerns to myself or practitioner. Patient verbalizes understanding. Patient plans to start Epclusa/RBV on 6/21/17.  Consultation included: indication; goals of treatment; administration; storage and handling; side effects; how to handle side effects; the importance of compliance; how to handle missed doses; the importance of laboratory monitoring; the importance of keeping all follow up appointments.  Patient understands to report any medication changes to [facility] and provider. All questions answered and addressed to patients satisfaction. I will f/u with her in 1 week from start, [facility] to contact patient in 3 weeks for refills.

## 2017-06-19 NOTE — TELEPHONE ENCOUNTER
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

## 2017-06-20 NOTE — TELEPHONE ENCOUNTER
I spoke with patient's wife and message from PA Scheuermann relayed.  Labs scheduled and reminder notices mailed.  She did not want to schedule f/u appt at this time.  She states that she will call us back with a possible date.

## 2017-06-26 ENCOUNTER — HOSPITAL ENCOUNTER (OUTPATIENT)
Dept: RADIOLOGY | Facility: HOSPITAL | Age: 62
Discharge: HOME | End: 2017-06-26
Attending: INTERNAL MEDICINE
Payer: MEDICARE

## 2017-06-26 DIAGNOSIS — K74.60 HEPATIC CIRRHOSIS, UNSPECIFIED HEPATIC CIRRHOSIS TYPE: ICD-10-CM

## 2017-06-26 DIAGNOSIS — Z94.4 LIVER TRANSPLANTED: ICD-10-CM

## 2017-06-26 PROCEDURE — 93975 VASCULAR STUDY: CPT | Mod: 26,GC,, | Performed by: INTERNAL MEDICINE

## 2017-06-26 PROCEDURE — 76705 ECHO EXAM OF ABDOMEN: CPT | Mod: 26,59,GC, | Performed by: INTERNAL MEDICINE

## 2017-06-26 PROCEDURE — 93975 VASCULAR STUDY: CPT | Mod: TC

## 2017-06-27 ENCOUNTER — TELEPHONE (OUTPATIENT)
Dept: TRANSPLANT | Facility: CLINIC | Age: 62
End: 2017-06-27

## 2017-06-27 NOTE — LETTER
June 27, 2017    Vick Gonzalez  [address] [facility] [address]             [facility] Transplant  [address]  [place] [address]  Phone: [phone] Mr. Gonzalez,    Dr. Coleman reviewed your ultrasound and it was stable.    Please call our office if you have any questions or concerns.    Sincerely,    Kandy Herrera RN, BSN  Liver Transplant Coordinator

## 2017-07-03 ENCOUNTER — EPISODE CHANGES (OUTPATIENT)
Dept: HEPATOLOGY | Facility: CLINIC | Age: 62
End: 2017-07-03

## 2017-07-03 ENCOUNTER — LAB VISIT (OUTPATIENT)
Dept: LAB | Facility: HOSPITAL | Age: 62
End: 2017-07-03
Attending: INTERNAL MEDICINE
Payer: MEDICARE

## 2017-07-03 DIAGNOSIS — Z94.4 LIVER TRANSPLANTED: ICD-10-CM

## 2017-07-03 DIAGNOSIS — K74.60 HEPATIC CIRRHOSIS, UNSPECIFIED HEPATIC CIRRHOSIS TYPE: ICD-10-CM

## 2017-07-03 DIAGNOSIS — B18.2 CHRONIC HEPATITIS C WITHOUT HEPATIC COMA: ICD-10-CM

## 2017-07-03 LAB
AFP SERPL-MCNC: 16 NG/ML
ALBUMIN SERPL BCP-MCNC: 3.1 G/DL
ALP SERPL-CCNC: 138 U/L
ALT SERPL W/O P-5'-P-CCNC: 25 U/L
ANION GAP SERPL CALC-SCNC: 9 MMOL/L
AST SERPL-CCNC: 27 U/L
BASOPHILS # BLD AUTO: 0.01 K/UL
BASOPHILS NFR BLD: 0.1 %
BILIRUB SERPL-MCNC: 0.4 MG/DL
BUN SERPL-MCNC: 14 MG/DL
CALCIUM SERPL-MCNC: 8.7 MG/DL
CHLORIDE SERPL-SCNC: 105 MMOL/L
CO2 SERPL-SCNC: 23 MMOL/L
CREAT SERPL-MCNC: 1.6 MG/DL
DIFFERENTIAL METHOD: ABNORMAL
EOSINOPHIL # BLD AUTO: 0.3 K/UL
EOSINOPHIL NFR BLD: 4.3 %
ERYTHROCYTE [DISTWIDTH] IN BLOOD BY AUTOMATED COUNT: 13.7 %
EST. GFR  (AFRICAN AMERICAN): 54 ML/MIN/1.73 M^2
EST. GFR  (NON AFRICAN AMERICAN): 46 ML/MIN/1.73 M^2
GLUCOSE SERPL-MCNC: 171 MG/DL
HCT VFR BLD AUTO: 37 %
HGB BLD-MCNC: 12.2 G/DL
LYMPHOCYTES # BLD AUTO: 2.5 K/UL
LYMPHOCYTES NFR BLD: 33 %
MCH RBC QN AUTO: 28.4 PG
MCHC RBC AUTO-ENTMCNC: 33 %
MCV RBC AUTO: 86 FL
MONOCYTES # BLD AUTO: 0.5 K/UL
MONOCYTES NFR BLD: 7.1 %
NEUTROPHILS # BLD AUTO: 4.1 K/UL
NEUTROPHILS NFR BLD: 55.5 %
PLATELET # BLD AUTO: 126 K/UL
PMV BLD AUTO: ABNORMAL FL
POTASSIUM SERPL-SCNC: 4 MMOL/L
PROT SERPL-MCNC: 7 G/DL
RBC # BLD AUTO: 4.29 M/UL
SODIUM SERPL-SCNC: 137 MMOL/L
WBC # BLD AUTO: 7.48 K/UL

## 2017-07-03 PROCEDURE — 36415 COLL VENOUS BLD VENIPUNCTURE: CPT

## 2017-07-03 PROCEDURE — 80197 ASSAY OF TACROLIMUS: CPT

## 2017-07-03 PROCEDURE — 85025 COMPLETE CBC W/AUTO DIFF WBC: CPT

## 2017-07-03 PROCEDURE — 82105 ALPHA-FETOPROTEIN SERUM: CPT

## 2017-07-03 PROCEDURE — 80053 COMPREHEN METABOLIC PANEL: CPT

## 2017-07-04 LAB — TACROLIMUS BLD-MCNC: 5 NG/ML

## 2017-07-05 ENCOUNTER — TELEPHONE (OUTPATIENT)
Dept: TRANSPLANT | Facility: CLINIC | Age: 62
End: 2017-07-05

## 2017-07-05 NOTE — TELEPHONE ENCOUNTER
Epclusa and rbv follow-up. Confirmed 2 patient identifiers - name and . Start date confirmed- 17. No side effects or missed doses reported - wife is his reminder. Patient confirms dosing - no changes by provider since start, no difficulties with administration. He/she confirms no changes to insurance or health and has no further questions at this time.  All questions answered and addressed to patients satisfaction. [facility] will continue to reach out to patient monthly to arrange refills.

## 2017-07-06 ENCOUNTER — TELEPHONE (OUTPATIENT)
Dept: HEPATOLOGY | Facility: CLINIC | Age: 62
End: 2017-07-06

## 2017-07-06 DIAGNOSIS — B18.2 CHRONIC HEPATITIS C WITHOUT HEPATIC COMA: ICD-10-CM

## 2017-07-06 NOTE — TELEPHONE ENCOUNTER
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 Cr 1.6  Tac 5.0  AFP 16    pls call pt:  Labs look good. Liver enzymes now normal! Prograf in good range  - Continue Epclusa - 1 pill daily - don't miss any doses.  - Continue Ribavirin 600mg daily - THREE 200mg pills each day  - continue current prograf dose    Next labs due   - CBC, CMP at week 3 - 7/10  - CBC, CMP, Prograf, HCV RNA at week 4 - 7/17

## 2017-07-07 NOTE — TELEPHONE ENCOUNTER
I spoke with patient's wife and message from PA Scheuermann relayed.  Future labs already scheduled.

## 2017-07-10 ENCOUNTER — TELEPHONE (OUTPATIENT)
Dept: PHARMACY | Facility: CLINIC | Age: 62
End: 2017-07-10

## 2017-07-10 ENCOUNTER — TELEPHONE (OUTPATIENT)
Dept: HEPATOLOGY | Facility: CLINIC | Age: 62
End: 2017-07-10

## 2017-07-10 ENCOUNTER — LAB VISIT (OUTPATIENT)
Dept: LAB | Facility: HOSPITAL | Age: 62
End: 2017-07-10
Attending: INTERNAL MEDICINE
Payer: MEDICARE

## 2017-07-10 ENCOUNTER — EPISODE CHANGES (OUTPATIENT)
Dept: HEPATOLOGY | Facility: CLINIC | Age: 62
End: 2017-07-10

## 2017-07-10 DIAGNOSIS — B18.2 CHRONIC HEPATITIS C WITHOUT HEPATIC COMA: ICD-10-CM

## 2017-07-10 LAB
ALBUMIN SERPL BCP-MCNC: 3.3 G/DL
ALP SERPL-CCNC: 122 U/L
ALT SERPL W/O P-5'-P-CCNC: 25 U/L
ANION GAP SERPL CALC-SCNC: 11 MMOL/L
AST SERPL-CCNC: 24 U/L
BASOPHILS # BLD AUTO: 0.02 K/UL
BASOPHILS NFR BLD: 0.2 %
BILIRUB SERPL-MCNC: 0.4 MG/DL
BUN SERPL-MCNC: 20 MG/DL
CALCIUM SERPL-MCNC: 8.9 MG/DL
CHLORIDE SERPL-SCNC: 104 MMOL/L
CO2 SERPL-SCNC: 23 MMOL/L
CREAT SERPL-MCNC: 1.6 MG/DL
DIFFERENTIAL METHOD: ABNORMAL
EOSINOPHIL # BLD AUTO: 0.6 K/UL
EOSINOPHIL NFR BLD: 5.1 %
ERYTHROCYTE [DISTWIDTH] IN BLOOD BY AUTOMATED COUNT: 13.9 %
EST. GFR  (AFRICAN AMERICAN): 53.7 ML/MIN/1.73 M^2
EST. GFR  (NON AFRICAN AMERICAN): 46.5 ML/MIN/1.73 M^2
GIANT PLATELETS BLD QL SMEAR: PRESENT
GLUCOSE SERPL-MCNC: 182 MG/DL
HCT VFR BLD AUTO: 39.8 %
HGB BLD-MCNC: 13.1 G/DL
LYMPHOCYTES # BLD AUTO: 3.6 K/UL
LYMPHOCYTES NFR BLD: 31.2 %
MCH RBC QN AUTO: 28.9 PG
MCHC RBC AUTO-ENTMCNC: 32.9 %
MCV RBC AUTO: 88 FL
MONOCYTES # BLD AUTO: 0.9 K/UL
MONOCYTES NFR BLD: 7.5 %
NEUTROPHILS # BLD AUTO: 6.4 K/UL
NEUTROPHILS NFR BLD: 56 %
PLATELET # BLD AUTO: 126 K/UL
PLATELET BLD QL SMEAR: ABNORMAL
PMV BLD AUTO: 13.7 FL
POTASSIUM SERPL-SCNC: 4.2 MMOL/L
PROT SERPL-MCNC: 7.5 G/DL
RBC # BLD AUTO: 4.53 M/UL
SODIUM SERPL-SCNC: 138 MMOL/L
WBC # BLD AUTO: 11.48 K/UL

## 2017-07-10 PROCEDURE — 80053 COMPREHEN METABOLIC PANEL: CPT

## 2017-07-10 PROCEDURE — 85025 COMPLETE CBC W/AUTO DIFF WBC: CPT

## 2017-07-10 PROCEDURE — 36415 COLL VENOUS BLD VENIPUNCTURE: CPT | Mod: PO

## 2017-07-10 NOTE — TELEPHONE ENCOUNTER
Epclusa and RBV refill arrangement w/ spouse, Cely. Confirmed 2 patient identifiers - name and . He/she has 6 doses remaining which will get him/her through 17. No  missed doses reported. Patient developed a rash on right arm x 1 week. He started using hydrocortisone on it today, advised to call should rash worsen or spread. Dosage confirmed - RBV - 3 tablets daily, labs drawn today. He/she confirms no changes to insurance or health and has no further questions at this time. Patient asked to have medication shipped on 17 to arrive at patient's home on 17. $0 copay. Previous balance of $11.33 charged at register today - CC information provided by spouse. Address confirmed - no signature requirement requested.  All questions answered and addressed to patients satisfaction. [facility] will continue to reach out to patient monthly to arrange refills.

## 2017-07-11 NOTE — TELEPHONE ENCOUNTER
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 Cr 1.6    pls call pt:  Labs look good. Liver enzymes normal  - Continue Epclusa - 1 pill daily - don't miss any doses.  - Continue Ribavirin 600mg daily - THREE 200mg pills each day  - continue current prograf dose    Next labs due   - CBC, CMP, Prograf, HCV RNA at week 4 - 7/17

## 2017-07-17 ENCOUNTER — EPISODE CHANGES (OUTPATIENT)
Dept: HEPATOLOGY | Facility: CLINIC | Age: 62
End: 2017-07-17

## 2017-07-17 ENCOUNTER — LAB VISIT (OUTPATIENT)
Dept: LAB | Facility: HOSPITAL | Age: 62
End: 2017-07-17
Attending: INTERNAL MEDICINE
Payer: MEDICARE

## 2017-07-17 DIAGNOSIS — Z94.4 HISTORY OF LIVER TRANSPLANT: ICD-10-CM

## 2017-07-17 DIAGNOSIS — B18.2 CHRONIC HEPATITIS C WITHOUT HEPATIC COMA: ICD-10-CM

## 2017-07-17 DIAGNOSIS — Z79.60 LONG-TERM USE OF IMMUNOSUPPRESSANT MEDICATION: ICD-10-CM

## 2017-07-17 LAB
ALBUMIN SERPL BCP-MCNC: 3.3 G/DL
ALP SERPL-CCNC: 143 U/L
ALT SERPL W/O P-5'-P-CCNC: 25 U/L
ANION GAP SERPL CALC-SCNC: 10 MMOL/L
AST SERPL-CCNC: 21 U/L
BASOPHILS # BLD AUTO: 0.02 K/UL
BASOPHILS NFR BLD: 0.2 %
BILIRUB SERPL-MCNC: 0.5 MG/DL
BUN SERPL-MCNC: 15 MG/DL
CALCIUM SERPL-MCNC: 9.4 MG/DL
CHLORIDE SERPL-SCNC: 107 MMOL/L
CO2 SERPL-SCNC: 21 MMOL/L
CREAT SERPL-MCNC: 1.2 MG/DL
DIFFERENTIAL METHOD: ABNORMAL
EOSINOPHIL # BLD AUTO: 0.5 K/UL
EOSINOPHIL NFR BLD: 5 %
ERYTHROCYTE [DISTWIDTH] IN BLOOD BY AUTOMATED COUNT: 13.2 %
EST. GFR  (AFRICAN AMERICAN): >60 ML/MIN/1.73 M^2
EST. GFR  (NON AFRICAN AMERICAN): >60 ML/MIN/1.73 M^2
GIANT PLATELETS BLD QL SMEAR: PRESENT
GLUCOSE SERPL-MCNC: 189 MG/DL
HCT VFR BLD AUTO: 41.1 %
HGB BLD-MCNC: 13.6 G/DL
LYMPHOCYTES # BLD AUTO: 2.9 K/UL
LYMPHOCYTES NFR BLD: 30.9 %
MCH RBC QN AUTO: 28.5 PG
MCHC RBC AUTO-ENTMCNC: 33.1 %
MCV RBC AUTO: 86 FL
MONOCYTES # BLD AUTO: 0.9 K/UL
MONOCYTES NFR BLD: 8.9 %
NEUTROPHILS # BLD AUTO: 5.2 K/UL
NEUTROPHILS NFR BLD: 54.5 %
PLATELET # BLD AUTO: 134 K/UL
PMV BLD AUTO: 14.2 FL
POTASSIUM SERPL-SCNC: 4 MMOL/L
PROT SERPL-MCNC: 7.5 G/DL
RBC # BLD AUTO: 4.77 M/UL
SODIUM SERPL-SCNC: 138 MMOL/L
TACROLIMUS BLD-MCNC: 4.5 NG/ML
WBC # BLD AUTO: 9.51 K/UL

## 2017-07-17 PROCEDURE — 80053 COMPREHEN METABOLIC PANEL: CPT

## 2017-07-17 PROCEDURE — 87522 HEPATITIS C REVRS TRNSCRPJ: CPT

## 2017-07-17 PROCEDURE — 85025 COMPLETE CBC W/AUTO DIFF WBC: CPT

## 2017-07-17 PROCEDURE — 80197 ASSAY OF TACROLIMUS: CPT

## 2017-07-19 ENCOUNTER — TELEPHONE (OUTPATIENT)
Dept: HEPATOLOGY | Facility: CLINIC | Age: 62
End: 2017-07-19

## 2017-07-19 DIAGNOSIS — Z79.60 LONG-TERM USE OF IMMUNOSUPPRESSANT MEDICATION: ICD-10-CM

## 2017-07-19 DIAGNOSIS — B18.2 CHRONIC HEPATITIS C WITHOUT HEPATIC COMA: ICD-10-CM

## 2017-07-19 DIAGNOSIS — Z94.4 HISTORY OF LIVER TRANSPLANT: Primary | ICD-10-CM

## 2017-07-19 LAB
HCV LOG: 1.88 LOG (10) IU/ML
HCV RNA QUANT PCR: 76 IU/ML
HCV, QUALITATIVE: DETECTED IU/ML

## 2017-07-19 NOTE — TELEPHONE ENCOUNTER
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 Cr 1.2  Prograf 4.5  HCV 76 IU/mL    pls call pt:  Labs look good. Liver enzymes normal. HCV has dropped from > 5 million down to 76.  - Continue Epclusa - 1 pill daily - don't miss any doses.  - Continue Ribavirin 600mg daily - THREE 200mg pills each day  - continue current prograf dose    Next labs due - pls schedule  - CBC, CMP, Prograf at week 7 - 8/7  - CBC, CMP, Prograf, HCV RNA at week 10 - 8/28

## 2017-08-02 ENCOUNTER — TELEPHONE (OUTPATIENT)
Dept: PHARMACY | Facility: CLINIC | Age: 62
End: 2017-08-02

## 2017-08-07 ENCOUNTER — LAB VISIT (OUTPATIENT)
Dept: LAB | Facility: HOSPITAL | Age: 62
End: 2017-08-07
Attending: INTERNAL MEDICINE
Payer: MEDICARE

## 2017-08-07 ENCOUNTER — EPISODE CHANGES (OUTPATIENT)
Dept: HEPATOLOGY | Facility: CLINIC | Age: 62
End: 2017-08-07

## 2017-08-07 DIAGNOSIS — Z79.60 LONG-TERM USE OF IMMUNOSUPPRESSANT MEDICATION: ICD-10-CM

## 2017-08-07 DIAGNOSIS — Z94.4 HISTORY OF LIVER TRANSPLANT: ICD-10-CM

## 2017-08-07 DIAGNOSIS — B18.2 CHRONIC HEPATITIS C WITHOUT HEPATIC COMA: ICD-10-CM

## 2017-08-07 LAB
ALBUMIN SERPL BCP-MCNC: 3.2 G/DL
ALP SERPL-CCNC: 104 U/L
ALT SERPL W/O P-5'-P-CCNC: 20 U/L
ANION GAP SERPL CALC-SCNC: 9 MMOL/L
AST SERPL-CCNC: 17 U/L
BASOPHILS # BLD AUTO: 0.02 K/UL
BASOPHILS NFR BLD: 0.2 %
BILIRUB SERPL-MCNC: 0.6 MG/DL
BUN SERPL-MCNC: 15 MG/DL
CALCIUM SERPL-MCNC: 8.7 MG/DL
CHLORIDE SERPL-SCNC: 102 MMOL/L
CO2 SERPL-SCNC: 24 MMOL/L
CREAT SERPL-MCNC: 1.3 MG/DL
DIFFERENTIAL METHOD: ABNORMAL
EOSINOPHIL # BLD AUTO: 0.4 K/UL
EOSINOPHIL NFR BLD: 5 %
ERYTHROCYTE [DISTWIDTH] IN BLOOD BY AUTOMATED COUNT: 13.7 %
EST. GFR  (AFRICAN AMERICAN): >60 ML/MIN/1.73 M^2
EST. GFR  (NON AFRICAN AMERICAN): 59.7 ML/MIN/1.73 M^2
GLUCOSE SERPL-MCNC: 331 MG/DL
HCT VFR BLD AUTO: 37.3 %
HGB BLD-MCNC: 12.2 G/DL
LYMPHOCYTES # BLD AUTO: 3.2 K/UL
LYMPHOCYTES NFR BLD: 40 %
MCH RBC QN AUTO: 28.4 PG
MCHC RBC AUTO-ENTMCNC: 32.7 G/DL
MCV RBC AUTO: 87 FL
MONOCYTES # BLD AUTO: 0.6 K/UL
MONOCYTES NFR BLD: 7.7 %
NEUTROPHILS # BLD AUTO: 3.8 K/UL
NEUTROPHILS NFR BLD: 47.1 %
PLATELET # BLD AUTO: 125 K/UL
PMV BLD AUTO: 14 FL
POTASSIUM SERPL-SCNC: 4.3 MMOL/L
PROT SERPL-MCNC: 7 G/DL
RBC # BLD AUTO: 4.29 M/UL
SODIUM SERPL-SCNC: 135 MMOL/L
WBC # BLD AUTO: 8.07 K/UL

## 2017-08-07 PROCEDURE — 80197 ASSAY OF TACROLIMUS: CPT

## 2017-08-07 PROCEDURE — 36415 COLL VENOUS BLD VENIPUNCTURE: CPT | Mod: PO

## 2017-08-07 PROCEDURE — 85025 COMPLETE CBC W/AUTO DIFF WBC: CPT

## 2017-08-07 PROCEDURE — 80053 COMPREHEN METABOLIC PANEL: CPT

## 2017-08-08 ENCOUNTER — TELEPHONE (OUTPATIENT)
Dept: TRANSPLANT | Facility: CLINIC | Age: 62
End: 2017-08-08

## 2017-08-08 ENCOUNTER — TELEPHONE (OUTPATIENT)
Dept: HEPATOLOGY | Facility: CLINIC | Age: 62
End: 2017-08-08

## 2017-08-08 DIAGNOSIS — B18.2 CHRONIC HEPATITIS C WITHOUT HEPATIC COMA: ICD-10-CM

## 2017-08-08 LAB — TACROLIMUS BLD-MCNC: 4.5 NG/ML

## 2017-08-08 NOTE — TELEPHONE ENCOUNTER
Called pt's wife with appointment day and time. Wife states she is not sure pt will be able to make it.  Wife will call back tomorrow once she discusses with the pt.

## 2017-08-08 NOTE — TELEPHONE ENCOUNTER
"Epclusa and Rbv refill arrangement (3 of 6) with pt's wife, Cely. . Confirmed 2 patient identifiers - name and . He/she has 6 days of doses remaining. No side effects or missed doses reported. She does mention "boils in the butt crack area" that has been present for about 3 weeks since starting the medication. She has been putting neosporin on it but unable to say if it has gotten better. She does not think anything else is causing it. He/she confirms no changes to insurance or health and has no further questions at this time. Patient asked to have medication shipped on 8/10/17 to arrive at patient's home on 17. $0 copay. Address confirmed - NO signature requirement requested.  All questions answered and addressed to patients satisfaction. [facility] will continue to reach out to patient monthly to arrange refills.     "

## 2017-08-08 NOTE — TELEPHONE ENCOUNTER
Reviewed w/ Dr Coleman    Will schedule pt to be seen in clinic by either Amy or Dr Coleman this week

## 2017-08-08 NOTE — TELEPHONE ENCOUNTER
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 Cr 1.3  Prograf 4.5    pls call pt:  Labs look good. Liver enzymes normal.  - Continue Epclusa - 1 pill daily - don't miss any doses.  - Continue Ribavirin 600mg daily - THREE 200mg pills each day  - continue current prograf dose    Next labs due -   - CBC, CMP, Prograf, HCV RNA at week 10 - 8/28

## 2017-08-08 NOTE — TELEPHONE ENCOUNTER
"----- Message from James Coleman MD sent at 8/8/2017  4:36 PM CDT -----  Regarding: RE: needs appt  2 pm on Thursday    ----- Message -----  From: Kandy Herrera RN  Sent: 8/8/2017   4:26 PM  To: James Coleman MD, #  Subject: RE: needs appt                                   Dr. Coleman, neither you or Amy have open slots this week.  Is there a day and time you can see him?    Kandy    ----- Message -----  From: Jennifer B. Scheuermann, PA  Sent: 8/8/2017   2:21 PM  To: [facility]Liver Transplant [facility]  Subject: needs appt                                       Pt's wife informed HCV pharmacist that he has "boils" on buttocks.  Can't get in to see PCP until 10/2017.    Per Dr Coleman, please schedule the pt to be seen by him or Amy this week.    Thanks  Nery      "

## 2017-08-17 PROCEDURE — 96376 TX/PRO/DX INJ SAME DRUG ADON: CPT

## 2017-08-17 PROCEDURE — 96374 THER/PROPH/DIAG INJ IV PUSH: CPT

## 2017-08-17 PROCEDURE — 99284 EMERGENCY DEPT VISIT MOD MDM: CPT | Mod: 25

## 2017-08-17 PROCEDURE — 82962 GLUCOSE BLOOD TEST: CPT

## 2017-08-17 PROCEDURE — 96375 TX/PRO/DX INJ NEW DRUG ADDON: CPT

## 2017-08-17 PROCEDURE — 96361 HYDRATE IV INFUSION ADD-ON: CPT

## 2017-08-18 ENCOUNTER — HOSPITAL ENCOUNTER (EMERGENCY)
Facility: HOSPITAL | Age: 62
Discharge: HOME | End: 2017-08-18
Attending: EMERGENCY MEDICINE
Payer: MEDICARE

## 2017-08-18 VITALS
WEIGHT: 268 LBS | RESPIRATION RATE: 16 BRPM | BODY MASS INDEX: 34.39 KG/M2 | HEIGHT: 74 IN | OXYGEN SATURATION: 99 % | SYSTOLIC BLOOD PRESSURE: 157 MMHG | TEMPERATURE: 97 F | DIASTOLIC BLOOD PRESSURE: 83 MMHG | HEART RATE: 72 BPM

## 2017-08-18 DIAGNOSIS — E11.10 DKA (DIABETIC KETOACIDOSES): ICD-10-CM

## 2017-08-18 DIAGNOSIS — R05.9 COUGH: ICD-10-CM

## 2017-08-18 DIAGNOSIS — R73.9 HYPERGLYCEMIA: Primary | ICD-10-CM

## 2017-08-18 LAB
ALBUMIN SERPL BCP-MCNC: 3.5 G/DL
ALP SERPL-CCNC: 174 U/L
ALT SERPL W/O P-5'-P-CCNC: 29 U/L
ANION GAP SERPL CALC-SCNC: 13 MMOL/L
AST SERPL-CCNC: 20 U/L
B-OH-BUTYR BLD STRIP-SCNC: 0.1 MMOL/L
BACTERIA #/AREA URNS HPF: NORMAL /HPF
BASOPHILS # BLD AUTO: 0.01 K/UL
BASOPHILS NFR BLD: 0.1 %
BILIRUB SERPL-MCNC: 0.5 MG/DL
BILIRUB UR QL STRIP: NEGATIVE
BUN SERPL-MCNC: 22 MG/DL
CALCIUM SERPL-MCNC: 9.6 MG/DL
CHLORIDE SERPL-SCNC: 96 MMOL/L
CLARITY UR: CLEAR
CO2 SERPL-SCNC: 21 MMOL/L
COLOR UR: YELLOW
CREAT SERPL-MCNC: 1.7 MG/DL
DIFFERENTIAL METHOD: ABNORMAL
EOSINOPHIL # BLD AUTO: 0.3 K/UL
EOSINOPHIL NFR BLD: 3.8 %
ERYTHROCYTE [DISTWIDTH] IN BLOOD BY AUTOMATED COUNT: 13.7 %
EST. GFR  (AFRICAN AMERICAN): 50 ML/MIN/1.73 M^2
EST. GFR  (NON AFRICAN AMERICAN): 43 ML/MIN/1.73 M^2
GLUCOSE SERPL-MCNC: 662 MG/DL
GLUCOSE UR QL STRIP: ABNORMAL
HCT VFR BLD AUTO: 39.1 %
HGB BLD-MCNC: 12.9 G/DL
HGB UR QL STRIP: NEGATIVE
KETONES UR QL STRIP: NEGATIVE
LEUKOCYTE ESTERASE UR QL STRIP: NEGATIVE
LYMPHOCYTES # BLD AUTO: 2.6 K/UL
LYMPHOCYTES NFR BLD: 30.8 %
MCH RBC QN AUTO: 28.6 PG
MCHC RBC AUTO-ENTMCNC: 33 G/DL
MCV RBC AUTO: 87 FL
MICROSCOPIC COMMENT: NORMAL
MONOCYTES # BLD AUTO: 0.6 K/UL
MONOCYTES NFR BLD: 6.9 %
NEUTROPHILS # BLD AUTO: 4.9 K/UL
NEUTROPHILS NFR BLD: 58.2 %
NITRITE UR QL STRIP: NEGATIVE
PH UR STRIP: 6 [PH] (ref 5–8)
PLATELET # BLD AUTO: 133 K/UL
PMV BLD AUTO: 13.4 FL
POCT GLUCOSE: 254 MG/DL (ref 70–110)
POCT GLUCOSE: 355 MG/DL (ref 70–110)
POCT GLUCOSE: 394 MG/DL (ref 70–110)
POCT GLUCOSE: 461 MG/DL (ref 70–110)
POTASSIUM SERPL-SCNC: 4.5 MMOL/L
PROT SERPL-MCNC: 8 G/DL
PROT UR QL STRIP: NEGATIVE
RBC # BLD AUTO: 4.51 M/UL
RBC #/AREA URNS HPF: 4 /HPF (ref 0–4)
SODIUM SERPL-SCNC: 130 MMOL/L
SP GR UR STRIP: <=1.005 (ref 1–1.03)
SQUAMOUS #/AREA URNS HPF: 2 /HPF
TACROLIMUS BLD-MCNC: 3.7 NG/ML
URN SPEC COLLECT METH UR: ABNORMAL
UROBILINOGEN UR STRIP-ACNC: NEGATIVE EU/DL
WBC # BLD AUTO: 8.43 K/UL
WBC #/AREA URNS HPF: 1 /HPF (ref 0–5)
YEAST URNS QL MICRO: NORMAL

## 2017-08-18 PROCEDURE — 80053 COMPREHEN METABOLIC PANEL: CPT

## 2017-08-18 PROCEDURE — 82803 BLOOD GASES ANY COMBINATION: CPT

## 2017-08-18 PROCEDURE — 81000 URINALYSIS NONAUTO W/SCOPE: CPT

## 2017-08-18 PROCEDURE — 80197 ASSAY OF TACROLIMUS: CPT

## 2017-08-18 PROCEDURE — 82962 GLUCOSE BLOOD TEST: CPT | Mod: 91

## 2017-08-18 PROCEDURE — 99900035 HC TECH TIME PER 15 MIN (STAT)

## 2017-08-18 PROCEDURE — 25000003 PHARM REV CODE 250: Performed by: EMERGENCY MEDICINE

## 2017-08-18 PROCEDURE — 85025 COMPLETE CBC W/AUTO DIFF WBC: CPT

## 2017-08-18 PROCEDURE — 93010 ELECTROCARDIOGRAM REPORT: CPT | Mod: ,,, | Performed by: INTERNAL MEDICINE

## 2017-08-18 PROCEDURE — 63600175 PHARM REV CODE 636 W HCPCS: Performed by: EMERGENCY MEDICINE

## 2017-08-18 PROCEDURE — 82010 KETONE BODYS QUAN: CPT

## 2017-08-18 PROCEDURE — 93005 ELECTROCARDIOGRAM TRACING: CPT

## 2017-08-18 RX ORDER — SODIUM CHLORIDE 9 MG/ML
1000 INJECTION, SOLUTION INTRAVENOUS
Status: COMPLETED | OUTPATIENT
Start: 2017-08-18 | End: 2017-08-18

## 2017-08-18 RX ORDER — MORPHINE SULFATE 2 MG/ML
4 INJECTION, SOLUTION INTRAMUSCULAR; INTRAVENOUS
Status: COMPLETED | OUTPATIENT
Start: 2017-08-18 | End: 2017-08-18

## 2017-08-18 RX ORDER — MORPHINE SULFATE 2 MG/ML
6 INJECTION, SOLUTION INTRAMUSCULAR; INTRAVENOUS
Status: COMPLETED | OUTPATIENT
Start: 2017-08-18 | End: 2017-08-18

## 2017-08-18 RX ORDER — ACETAMINOPHEN 325 MG/1
650 TABLET ORAL
Status: DISCONTINUED | OUTPATIENT
Start: 2017-08-18 | End: 2017-08-18

## 2017-08-18 RX ADMIN — MORPHINE SULFATE 6 MG: 2 INJECTION, SOLUTION INTRAMUSCULAR; INTRAVENOUS at 05:08

## 2017-08-18 RX ADMIN — SODIUM CHLORIDE 1000 ML: 0.9 INJECTION, SOLUTION INTRAVENOUS at 05:08

## 2017-08-18 RX ADMIN — SODIUM CHLORIDE 1000 ML: 0.9 INJECTION, SOLUTION INTRAVENOUS at 12:08

## 2017-08-18 RX ADMIN — INSULIN HUMAN 10 UNITS: 100 INJECTION, SOLUTION PARENTERAL at 05:08

## 2017-08-18 RX ADMIN — SODIUM CHLORIDE 1000 ML: 0.9 INJECTION, SOLUTION INTRAVENOUS at 01:08

## 2017-08-18 RX ADMIN — INSULIN HUMAN 10 UNITS: 100 INJECTION, SOLUTION PARENTERAL at 02:08

## 2017-08-18 RX ADMIN — MORPHINE SULFATE 4 MG: 2 INJECTION, SOLUTION INTRAMUSCULAR; INTRAVENOUS at 02:08

## 2017-08-18 NOTE — ED TRIAGE NOTES
Pt. To ER with c/o frequent urination and thirst. He states his blood sugar is elevated. His skin is PWD. Awake, alert and oriented. Awaiting MD exam.

## 2017-08-18 NOTE — ED PROVIDER NOTES
"Encounter Date: 8/17/2017       History     Chief Complaint   Patient presents with    Hyperglycemia     pt CBG at home was 580mg/dl, pt ate peanut butter and rechecked it and the meter read high. pt also c/o urinary frequency     61-year-old male presents to the emergency Department with complaints of feeling fatigued and "not well" history of chronic hep C infection, status post liver transplant, as well as diabetes, insulin-dependent.  He reports he's been having a bitemporal headache, poor appetite, and noticed that his blood sugar today was reading high high.  He denies any medication changes or medication noncompliance.  He denies fevers chills cough or congestion.  Headache was slow in onset, without associated vision changes or neck stiffness.  He has had headaches like this in the past when his blood sugars elevated.      The history is provided by the patient.     Review of patient's allergies indicates:   Allergen Reactions    Naproxen      Other reaction(s): problems w/liver/kid    Oxaprozin      Other reaction(s): cause problems w/kid/liver     Past Medical History:   Diagnosis Date    Anemia     Anxiety     Chronic hepatitis C virus infection - RELAPSE following harvoni + RBV 8/23/2011    Completed 12 weeks Harvoni + RBV w/ RELAPSE 6 weeks out (3/2016)  Wk 1 Hgb 12.4, prograf 6.8 Wk 2 Hgb 12.7 Wk 4 Hgb 12.8, prograf 5.4; HCV RNA 47. INCREASE RBV to 600 Wk 5 Hgb 12.6 Wk 6 Hgb 12.4, HCV RNA <12, detected. INCREASE  Wk 7 Hgb 11.8 Wk 8 Hgb 11.7, prograf 4.0. INCREASE RBV 1000, INCREASE PROGRAF 2/1 Wk 9 Hgb 12.4, prograf 3.6  HCV RNA <12, detected. INCREASE PROGRAF 2/2 Wk 10 hgb     Chronic pain syndrome 7/13/2011    CKD (chronic kidney disease), stage III     Diabetes mellitus type II, uncontrolled     Discitis of lumbosacral region 1/16/2015    ED (erectile dysfunction)     Genital herpes     Gout, arthritis     History of alcohol abuse     History of positive PPD, treatment status " unknown     Pulmonary granulomas, negative sputum cultures for AFB and indeterminate quantferon test    History of substance abuse     Hypertension     Hypothyroidism     Peptic ulcer disease      Past Surgical History:   Procedure Laterality Date    CHOLECYSTECTOMY      LIVER TRANSPLANT  06/2010    SPINE SURGERY       Family History   Problem Relation Age of Onset    Cancer Mother     Diabetes Mother     Heart disease Mother     Diabetes Sister     Cancer Maternal Uncle 82     colon CA    Melanoma Neg Hx     Psoriasis Neg Hx     Lupus Neg Hx     Eczema Neg Hx     Acne Neg Hx      Social History   Substance Use Topics    Smoking status: Former Smoker    Smokeless tobacco: Never Used    Alcohol use No      Comment: over 5 years ago, none currently     Review of Systems   Constitutional: Positive for appetite change and fatigue.   Eyes: Negative.    Respiratory: Negative.    Endocrine: Negative.    All other systems reviewed and are negative.      Physical Exam     Initial Vitals [08/17/17 2305]   BP Pulse Resp Temp SpO2   (!) 161/85 78 18 98.6 °F (37 °C) 100 %      MAP       110.33         Physical Exam    Nursing note and vitals reviewed.  Constitutional: He appears well-developed and well-nourished. He appears distressed.   HENT:   Head: Normocephalic.   Eyes: EOM are normal. Pupils are equal, round, and reactive to light.   Neck: Normal range of motion. Neck supple.   No rigidity   Cardiovascular: Normal rate and regular rhythm.   Pulmonary/Chest: Breath sounds normal.   Abdominal: Soft. Bowel sounds are normal.   No suprapubic tenderness or CVA tenderness   Musculoskeletal: Normal range of motion.   Neurological: He is alert and oriented to person, place, and time. He has normal strength.   Skin: Skin is warm and dry. No pallor.   Psychiatric: He has a normal mood and affect. Thought content normal.         ED Course   Critical Care  Date/Time: 8/18/2017 7:06 AM  Performed by: BOSTON HOUSER  IRIS.  Authorized by: BOSTON HOUSER   Direct patient critical care time: 8 minutes  Additional history critical care time: 8 minutes  Ordering / reviewing critical care time: 8 minutes  Documentation critical care time: 8 minutes  Total critical care time (exclusive of procedural time) : 32 minutes  Critical care time was exclusive of teaching time and separately billable procedures and treating other patients.  Critical care was necessary to treat or prevent imminent or life-threatening deterioration of the following conditions: endocrine crisis.  Critical care was time spent personally by me on the following activities: examination of patient, evaluation of patient's response to treatment, discussions with primary provider, development of treatment plan with patient or surrogate, ordering and review of laboratory studies, ordering and performing treatments and interventions, obtaining history from patient or surrogate, pulse oximetry, re-evaluation of patient's condition and review of old charts.        Labs Reviewed   CBC W/ AUTO DIFFERENTIAL - Abnormal; Notable for the following:        Result Value    RBC 4.51 (*)     Hemoglobin 12.9 (*)     Hematocrit 39.1 (*)     Platelets 133 (*)     MPV 13.4 (*)     All other components within normal limits   COMPREHENSIVE METABOLIC PANEL - Abnormal; Notable for the following:     Sodium 130 (*)     CO2 21 (*)     Glucose 662 (*)     BUN, Bld 22 (*)     Creatinine 1.7 (*)     Alkaline Phosphatase 174 (*)     eGFR if  50 (*)     eGFR if non  43 (*)     All other components within normal limits    Narrative:        Glucose critical result(s) called and verbal readback obtained   from Sally Pires RN, 08/18/2017 01:09   URINALYSIS - Abnormal; Notable for the following:     Specific Gravity, UA <=1.005 (*)     Glucose, UA 3+ (*)     All other components within normal limits   TACROLIMUS LEVEL - Abnormal; Notable for the following:      "Tacrolimus Lvl 3.7 (*)     All other components within normal limits   POCT GLUCOSE - Abnormal; Notable for the following:     POCT Glucose 461 (*)     All other components within normal limits   POCT GLUCOSE - Abnormal; Notable for the following:     POCT Glucose 394 (*)     All other components within normal limits   POCT GLUCOSE - Abnormal; Notable for the following:     POCT Glucose 355 (*)     All other components within normal limits   POCT GLUCOSE - Abnormal; Notable for the following:     POCT Glucose 254 (*)     All other components within normal limits   BETA - HYDROXYBUTYRATE, SERUM   URINALYSIS MICROSCOPIC             Medical Decision Making:   Initial Assessment:   61-year-old male presents to the emergency Department with complaints of feeling fatigued and "not well" history of chronic hep C infection, status post liver transplant, as well as diabetes, insulin-dependent.  Differential Diagnosis:   Diabetic ketoacidosis, urinary tract infection, anemia, electrolyte derangement  Patient was given IV fluids, insulin, analgesics.  He was not in diabetic ketoacidosis, but was notably hyperglycemic.  He was given IV fluids and insulin analgesics and discharged with an outpatient follow-up.                   ED Course     Clinical Impression:   The primary encounter diagnosis was Hyperglycemia. Diagnoses of DKA (diabetic ketoacidoses) and Cough were also pertinent to this visit.                           Lyly Lamar MD  08/1955    "

## 2017-08-23 ENCOUNTER — OFFICE VISIT (OUTPATIENT)
Dept: FAMILY MEDICINE | Facility: CLINIC | Age: 62
End: 2017-08-23
Payer: MEDICARE

## 2017-08-23 ENCOUNTER — OUTPATIENT CASE MANAGEMENT (OUTPATIENT)
Dept: ADMINISTRATIVE | Facility: OTHER | Age: 62
End: 2017-08-23

## 2017-08-23 VITALS
HEIGHT: 73 IN | BODY MASS INDEX: 35.94 KG/M2 | WEIGHT: 271.19 LBS | OXYGEN SATURATION: 100 % | HEART RATE: 80 BPM | DIASTOLIC BLOOD PRESSURE: 77 MMHG | SYSTOLIC BLOOD PRESSURE: 116 MMHG

## 2017-08-23 DIAGNOSIS — E11.65 UNCONTROLLED TYPE 2 DIABETES MELLITUS WITH OTHER DIABETIC KIDNEY COMPLICATION, UNSPECIFIED LONG TERM INSULIN USE STATUS: ICD-10-CM

## 2017-08-23 DIAGNOSIS — N18.30 CKD (CHRONIC KIDNEY DISEASE), STAGE III: ICD-10-CM

## 2017-08-23 DIAGNOSIS — E11.29 UNCONTROLLED TYPE 2 DIABETES MELLITUS WITH OTHER DIABETIC KIDNEY COMPLICATION, UNSPECIFIED LONG TERM INSULIN USE STATUS: ICD-10-CM

## 2017-08-23 LAB
GLUCOSE SERPL-MCNC: 415 MG/DL (ref 70–110)
GLUCOSE SERPL-MCNC: 419 MG/DL (ref 70–110)

## 2017-08-23 PROCEDURE — 99215 OFFICE O/P EST HI 40 MIN: CPT | Mod: PBBFAC,PO | Performed by: FAMILY MEDICINE

## 2017-08-23 PROCEDURE — 3078F DIAST BP <80 MM HG: CPT | Mod: ,,, | Performed by: FAMILY MEDICINE

## 2017-08-23 PROCEDURE — 99999 PR PBB SHADOW E&M-EST. PATIENT-LVL V: CPT | Mod: PBBFAC,,, | Performed by: FAMILY MEDICINE

## 2017-08-23 PROCEDURE — 3074F SYST BP LT 130 MM HG: CPT | Mod: ,,, | Performed by: FAMILY MEDICINE

## 2017-08-23 PROCEDURE — 82948 REAGENT STRIP/BLOOD GLUCOSE: CPT | Mod: PBBFAC,PO | Performed by: FAMILY MEDICINE

## 2017-08-23 PROCEDURE — 99215 OFFICE O/P EST HI 40 MIN: CPT | Mod: 25,S$PBB,, | Performed by: FAMILY MEDICINE

## 2017-08-23 PROCEDURE — 3045F PR MOST RECENT HEMOGLOBIN A1C LEVEL 7.0-9.0%: CPT | Mod: ,,, | Performed by: FAMILY MEDICINE

## 2017-08-23 NOTE — PROGRESS NOTES
"(Portions of this note were dictated using voice recognition software and may contain dictation related errors in spelling/grammar/syntax not found on text review)    CC: No chief complaint on file.      HPI: 61 y.o. male last seen in March for diabetes which was uncontrolled.  Had hyperglycemia and hospitalization secondary to this.  At that time on Levemir increased to 45 units daily plus NovoLog 12 units with meals plus correction factor scale.  We discussed up titration protocol with Levemir but at that time had done so.    Was recently seen in ED on 818 7 seen with hyperglycemia.  Patient was complaining of fatigue and headache.  Patient was given 2 rounds of 10 units of insulin in the ED along with fluids.  Initial sugar on intake was 662, creatinine 1.7.  Discharge sugar was 254.  UA did not show significant ketonuria, had 3+ glucose    In addition he has hypertension on lisinopril 10 mg a day, metoprolol 150 mg twice a day, nifedipine 60 mg daily blood pressures had been well controlled although ED blood pressure was high at 157/83    He presents today having checked his blood sugar in the morning in the 200 range.  He took 12 units of NovoLog in a breakfast.  He did not eat lunch this afternoon.  He checked his sugar prior to coming to his appointment and he was in the 400 range.  He feels somewhat dizzy and with headache.  No chest pain or shortness of breath.  On further discussion however, it appears that despite the fact he supposed be on at least 45 units of Levemir per our initial titration protocol, he is only taking 36 units of Levemir daily.  I am not exactly sure why he went down on this dose and patient is not sure either.    He also has chronic neuropathy of the feet, had seen podiatry in the past in 2014.  He would like to be referred again    He is frustrated by sexual dysfunction with ED.  Asks "can you send me to a sex doctor".    Past Medical History:   Diagnosis Date    Anemia     " Anxiety     Chronic hepatitis C virus infection - RELAPSE following harvoni + RBV 8/23/2011    Completed 12 weeks Harvoni + RBV w/ RELAPSE 6 weeks out (3/2016)  Wk 1 Hgb 12.4, prograf 6.8 Wk 2 Hgb 12.7 Wk 4 Hgb 12.8, prograf 5.4; HCV RNA 47. INCREASE RBV to 600 Wk 5 Hgb 12.6 Wk 6 Hgb 12.4, HCV RNA <12, detected. INCREASE  Wk 7 Hgb 11.8 Wk 8 Hgb 11.7, prograf 4.0. INCREASE RBV 1000, INCREASE PROGRAF 2/1 Wk 9 Hgb 12.4, prograf 3.6  HCV RNA <12, detected. INCREASE PROGRAF 2/2 Wk 10 hgb     Chronic pain syndrome 7/13/2011    Diabetes mellitus type II, uncontrolled     Discitis of lumbosacral region 1/16/2015    ED (erectile dysfunction)     Genital herpes     Gout, arthritis     History of alcohol abuse     History of positive PPD, treatment status unknown     Pulmonary granulomas, negative sputum cultures for AFB and indeterminate quantferon test    History of substance abuse     Hypertension     Hypothyroidism     Peptic ulcer disease        Past Surgical History:   Procedure Laterality Date    CHOLECYSTECTOMY      LIVER TRANSPLANT  06/2010    SPINE SURGERY         Family History   Problem Relation Age of Onset    Cancer Mother     Diabetes Mother     Heart disease Mother     Diabetes Sister     Cancer Maternal Uncle 82     colon CA    Melanoma Neg Hx     Psoriasis Neg Hx     Lupus Neg Hx     Eczema Neg Hx     Acne Neg Hx        Social History     Social History    Marital status:      Spouse name: N/A    Number of children: N/A    Years of education: N/A     Occupational History    Not on file.     Social History Main Topics    Smoking status: Former Smoker    Smokeless tobacco: Never Used    Alcohol use No      Comment: over 5 years ago, none currently    Drug use: No    Sexual activity: Not on file     Other Topics Concern    Not on file     Social History Narrative    No narrative on file     SCREENINGS  Colonoscopy 2015, return in 10 years  prostate testing  2016/8      Immunizations  Pneumovax up-to-date 2008  Hepatitis B series up-to-date  Patient states he had tetanus shot within the last 10 years  Flu: UTD    Lab Results   Component Value Date    WBC 8.43 08/18/2017    HGB 12.9 (L) 08/18/2017    HCT 39.1 (L) 08/18/2017     (L) 08/18/2017    CHOL 139 03/27/2017    TRIG 305 (H) 03/27/2017    HDL 25 (L) 03/27/2017    ALT 29 08/18/2017    AST 20 08/18/2017     (L) 08/18/2017    K 4.5 08/18/2017    CL 96 08/18/2017    CREATININE 1.7 (H) 08/18/2017    BUN 22 (H) 08/18/2017    CO2 21 (L) 08/18/2017    TSH 3.190 02/27/2017    PSA 0.30 09/06/2016    INR 1.0 02/27/2017    HGBA1C 9.0 (H) 02/21/2017    LDLCALC 53.0 (L) 03/27/2017     (HH) 08/18/2017       ROS:  GENERAL: Above.  SKIN: No rashes, no itching.  HEAD: Above.  EYES: No visual changes  EARS: No ear pain or changes in hearing.  NOSE: No congestion or rhinorrhea.  MOUTH & THROAT: No hoarseness, change in voice, or sore throat.  NODES: Denies swollen glands.  CHEST: Denies NARAYANAN, cyanosis, wheezing, cough and sputum production.  CARDIOVASCULAR: Denies chest pain, PND, orthopnea.  ABDOMEN: No nausea, vomiting, or changes in bowel function.  URINARY: No flank pain, dysuria or hematuria.  PERIPHERAL VASCULAR: No claudication or cyanosis.  MUSCULOSKELETAL: No joint stiffness or swelling. Denies back pain.  NEUROLOGIC: Above.    Vital signs reviewed  PE:   APPEARANCE: Well nourished, well developed, in no acute distress.    HEAD: Normocephalic, atraumatic.  EYES: PERRL. EOMI.   Conjunctivae noninjected.  EARS: TM's intact. Light reflex normal. No retraction or perforation  NOSE: Mucosa pink. Airway clear.  MOUTH & THROAT: No tonsillar enlargement. No pharyngeal erythema or exudate.   NECK: Supple with no cervical lymphadenopathy.  No carotid bruits.  No thyromegaly  CHEST: Good inspiratory effort. Lungs clear to auscultation with no wheezes or crackles.  CARDIOVASCULAR: Normal S1, S2. No rubs, murmurs, or  gallops.  ABDOMEN: Bowel sounds normal mild distention. Soft.  Mild lower abdominal tenderness on the right.  EXTREMITIES: No edema      IMPRESSION  1. Uncontrolled type 2 diabetes mellitus with diabetic neuropathy, with long-term current use of insulin    2. CKD (chronic kidney disease), stage III    3. Uncontrolled type 2 diabetes mellitus with other diabetic kidney complication, unspecified long term insulin use status            PLAN  Reviewed ED documentation.  Reviewed his labs.  Also had a chest x-ray in the ED which was negative for any intrapulmonary process.  Ketones negative on ED assessment    Point-of-care glucose testing done in the office, glucose is 415.  Patient was administered 10 units of his home NovoLog and was reassessed 20 minutes later.  Repeat glucose was 419.  He was administered an additional 10 units of his home NovoLog, and reassessment 20 minutes later was at 380.  He is discharged home with instructions below but needs to follow-up in the office in the next 3 weeks or so, appointment has been set.  He needs to bring his glucose log.    I counseled patient on the absolute importance of going back up to the 45 units of Levemir initially prescribed after his initial hyperglycemia related admission from February.  I rewrote him instructions on Levemir titration as below.  He should keep his NovoLog at 12 units with meals at this time plus correction factor as below.  He did not bring his glucose readings to the office unfortunately for review.  He is recommended to continue to record his glucose readings.  I again emphasized the importance of diabetes education although patient does not seem very enthusiastic about this prospect; however, I am concerned about likely poor understanding about his diabetes management as contributing to recent hyperglycemia      Your Levemir dose should be 45 units daily to start.       Diabetes:  Guideline for managing Levemir insulin      1. Check your  fasting blood sugars every morning for 3 days. Goal is to have most of your fasting blood sugars below 140.     2. If most of your fasting blood sugars in that 3 day period are above 140, increase your Levemir dose by 3 units.     3. If most of your fasting blood sugars in that 3 day period of are below 140 but higher than 70, keep your Levemir dose the same.     4. If most of your fasting blood sugars in that 3 day period are below 70, decrease your Levemir dose by 3 units.     5. Make sure to write your blood sugars down in a notebook; do not simply just store the numbers in your glucose meter.           Novolog: keep at 12 units with meals now     Sliding Scale for Novolog      Blood Glucose reading : how many extra units of Novolog to take  150-200 : +2  200-250  : +4  250-300  : +6  300-350  :  +8  >350   : +10     SAMPLE BLOOD SUGAR CHART                                                                                                                                                                                                                                                         DATE              Before breakfast Before lunch Before dinner Before bedtime

## 2017-08-23 NOTE — PATIENT INSTRUCTIONS
Your Levemir dose should be 45 units daily to start.       Diabetes:  Guideline for managing Levemir insulin      1. Check your fasting blood sugars every morning for 3 days. Goal is to have most of your fasting blood sugars below 140.     2. If most of your fasting blood sugars in that 3 day period are above 140, increase your Levemir dose by 3 units.     3. If most of your fasting blood sugars in that 3 day period of are below 140 but higher than 70, keep your Levemir dose the same.     4. If most of your fasting blood sugars in that 3 day period are below 70, decrease your Levemir dose by 3 units.     5. Make sure to write your blood sugars down in a notebook; do not simply just store the numbers in your glucose meter.           Novolog: keep at 12 units with meals now     Sliding Scale for Novolog      Blood Glucose reading : how many extra units of Novolog to take  150-200 : +2  200-250  : +4  250-300  : +6  300-350  :  +8  >350   : +10     SAMPLE BLOOD SUGAR CHART                                                                                                                                                                                                                                                         DATE              Before breakfast Before lunch Before dinner Before bedtime

## 2017-08-23 NOTE — PROGRESS NOTES
The following patient has been assigned to Edith Rios RN with Outpatient Complex Care [facility] for high risk screening.    Reason: High Risk    Please contact [facility] at [phone] with any questions.    Thank you,  Paula Duggan

## 2017-08-24 ENCOUNTER — OUTPATIENT CASE MANAGEMENT (OUTPATIENT)
Dept: ADMINISTRATIVE | Facility: OTHER | Age: 62
End: 2017-08-24

## 2017-08-24 NOTE — PROGRESS NOTES
First attempt to perform initial assessment for [facility]; unable to leave voice message to return call because mailbox was full.  DEBBY Rios OPCM-RN

## 2017-08-25 ENCOUNTER — OUTPATIENT CASE MANAGEMENT (OUTPATIENT)
Dept: ADMINISTRATIVE | Facility: OTHER | Age: 62
End: 2017-08-25

## 2017-08-25 NOTE — LETTER
August 25, 2017    Vick Gonzalez  [address] [place] [address]             [facility] [address] [place] [address] Dear Vick,    I work with [facility]'s Outpatient Case Management Department. I have been unsuccessful at reaching you to follow-up to see how you have been doing. If you require any future assistance or if any new concerns or problems arise, please do not hesitate to call.     The Outpatient Case Management Department can be reached at [phone] from 8:00AM to 4:30 PM on Monday thru Friday. [facility] also has a program where a nurse is available 24/7 to answer questions or provide medical advice, their number is [phone].    Thanks,      Edith Rios, RN  Outpatient Case Management

## 2017-08-25 NOTE — PROGRESS NOTES
After two failed attempts to perform initial assessment for [facility], letter will be sent.  Case close.  DEBBY Rios, OPCM-RN

## 2017-08-28 ENCOUNTER — OFFICE VISIT (OUTPATIENT)
Dept: PODIATRY | Facility: CLINIC | Age: 62
End: 2017-08-28
Payer: MEDICARE

## 2017-08-28 ENCOUNTER — LAB VISIT (OUTPATIENT)
Dept: LAB | Facility: HOSPITAL | Age: 62
End: 2017-08-28
Attending: INTERNAL MEDICINE
Payer: MEDICARE

## 2017-08-28 ENCOUNTER — EPISODE CHANGES (OUTPATIENT)
Dept: HEPATOLOGY | Facility: CLINIC | Age: 62
End: 2017-08-28

## 2017-08-28 ENCOUNTER — TELEPHONE (OUTPATIENT)
Dept: PODIATRY | Facility: CLINIC | Age: 62
End: 2017-08-28

## 2017-08-28 VITALS
DIASTOLIC BLOOD PRESSURE: 76 MMHG | BODY MASS INDEX: 35.92 KG/M2 | SYSTOLIC BLOOD PRESSURE: 129 MMHG | HEIGHT: 73 IN | WEIGHT: 271 LBS | HEART RATE: 71 BPM

## 2017-08-28 DIAGNOSIS — B18.2 CHRONIC HEPATITIS C WITHOUT HEPATIC COMA: ICD-10-CM

## 2017-08-28 DIAGNOSIS — Z79.60 LONG-TERM USE OF IMMUNOSUPPRESSANT MEDICATION: ICD-10-CM

## 2017-08-28 DIAGNOSIS — S91.109A WOUND, OPEN, TOE, INITIAL ENCOUNTER: ICD-10-CM

## 2017-08-28 DIAGNOSIS — M20.41 HAMMER TOES OF BOTH FEET: ICD-10-CM

## 2017-08-28 DIAGNOSIS — M20.42 HAMMER TOES OF BOTH FEET: ICD-10-CM

## 2017-08-28 DIAGNOSIS — Z94.4 HISTORY OF LIVER TRANSPLANT: ICD-10-CM

## 2017-08-28 LAB
ALBUMIN SERPL BCP-MCNC: 3.3 G/DL
ALP SERPL-CCNC: 92 U/L
ALT SERPL W/O P-5'-P-CCNC: 20 U/L
ANION GAP SERPL CALC-SCNC: 9 MMOL/L
AST SERPL-CCNC: 18 U/L
BASOPHILS # BLD AUTO: 0.02 K/UL
BASOPHILS NFR BLD: 0.2 %
BILIRUB SERPL-MCNC: 0.5 MG/DL
BUN SERPL-MCNC: 17 MG/DL
CALCIUM SERPL-MCNC: 9.1 MG/DL
CHLORIDE SERPL-SCNC: 107 MMOL/L
CO2 SERPL-SCNC: 22 MMOL/L
CREAT SERPL-MCNC: 1.3 MG/DL
DIFFERENTIAL METHOD: ABNORMAL
EOSINOPHIL # BLD AUTO: 0.5 K/UL
EOSINOPHIL NFR BLD: 5.9 %
ERYTHROCYTE [DISTWIDTH] IN BLOOD BY AUTOMATED COUNT: 13.8 %
EST. GFR  (AFRICAN AMERICAN): >60 ML/MIN/1.73 M^2
EST. GFR  (NON AFRICAN AMERICAN): 59.7 ML/MIN/1.73 M^2
GLUCOSE SERPL-MCNC: 201 MG/DL
HCT VFR BLD AUTO: 38.9 %
HGB BLD-MCNC: 13 G/DL
LYMPHOCYTES # BLD AUTO: 3.4 K/UL
LYMPHOCYTES NFR BLD: 36.6 %
MCH RBC QN AUTO: 28.7 PG
MCHC RBC AUTO-ENTMCNC: 33.4 G/DL
MCV RBC AUTO: 86 FL
MONOCYTES # BLD AUTO: 0.8 K/UL
MONOCYTES NFR BLD: 8.9 %
NEUTROPHILS # BLD AUTO: 4.5 K/UL
NEUTROPHILS NFR BLD: 48.2 %
PLATELET # BLD AUTO: 157 K/UL
PMV BLD AUTO: 13 FL
POTASSIUM SERPL-SCNC: 4.5 MMOL/L
PROT SERPL-MCNC: 7 G/DL
RBC # BLD AUTO: 4.53 M/UL
SODIUM SERPL-SCNC: 138 MMOL/L
TACROLIMUS BLD-MCNC: 5.3 NG/ML
WBC # BLD AUTO: 9.22 K/UL

## 2017-08-28 PROCEDURE — 3078F DIAST BP <80 MM HG: CPT | Mod: ,,, | Performed by: PODIATRIST

## 2017-08-28 PROCEDURE — 11721 DEBRIDE NAIL 6 OR MORE: CPT | Mod: S$PBB,Q9,, | Performed by: PODIATRIST

## 2017-08-28 PROCEDURE — 3045F PR MOST RECENT HEMOGLOBIN A1C LEVEL 7.0-9.0%: CPT | Mod: ,,, | Performed by: PODIATRIST

## 2017-08-28 PROCEDURE — 3074F SYST BP LT 130 MM HG: CPT | Mod: ,,, | Performed by: PODIATRIST

## 2017-08-28 PROCEDURE — 99215 OFFICE O/P EST HI 40 MIN: CPT | Mod: PBBFAC,PO,25 | Performed by: PODIATRIST

## 2017-08-28 PROCEDURE — 99999 PR PBB SHADOW E&M-EST. PATIENT-LVL V: CPT | Mod: PBBFAC,,, | Performed by: PODIATRIST

## 2017-08-28 PROCEDURE — 11721 DEBRIDE NAIL 6 OR MORE: CPT | Mod: PBBFAC,PO | Performed by: PODIATRIST

## 2017-08-28 PROCEDURE — 99214 OFFICE O/P EST MOD 30 MIN: CPT | Mod: 25,S$PBB,, | Performed by: PODIATRIST

## 2017-08-28 NOTE — LETTER
August 29, 2017      Emanuel Lopez MD  [address]  [address]  [facility] [address]           [facility]  [address] [address]  Phone: [phone]          Patient: Vick Gonzalez   MR Number: [id number]   YOB: 1955   Date of Visit: 8/28/2017       Dear Dr. Emanuel Lopez:    Thank you for referring Vick Gonzalez to me for evaluation. Attached you will find relevant portions of my assessment and plan of care.    If you have questions, please do not hesitate to call me. I look forward to following Vick Gonzalez along with you.    Sincerely,    Sony Oreilly, DPODILIA    Enclosure  CC:  No Recipients    If you would like to receive this communication electronically, please contact [email or web address] or [phone] to request more information on [employer] access.    For providers and/or their staff who would like to refer a patient to [facility], please contact us through our one-stop-shop provider referral line, [facility], at [phone].    If you feel you have received this communication in error or would no longer like to receive these types of communications, please e-mail [email or web address]

## 2017-08-28 NOTE — PROGRESS NOTES
Subjective:      Patient ID: Vick Gonzalez is a 61 y.o. male.    Chief Complaint: No chief complaint on file.    Vick is a 61 y.o. male who presents to the clinic for evaluation and treatment of high risk feet. Vick has a past medical history of Anemia; Anxiety; Chronic hepatitis C virus infection - RELAPSE following harvoni + RBV (8/23/2011); Chronic pain syndrome (7/13/2011); CKD (chronic kidney disease), stage III; Diabetes mellitus type II, uncontrolled; Discitis of lumbosacral region (1/16/2015); ED (erectile dysfunction); Genital herpes; Gout, arthritis; History of alcohol abuse; History of positive PPD, treatment status unknown; History of substance abuse; Hypertension; Hypothyroidism; and Peptic ulcer disease. The patient's chief complaint is long, thick toenails. This patient has documented high risk feet requiring routine maintenance secondary to peripheral vascular disease.Reports injury to right great toe nail 3 days ago, states sheet ripped toenail off.     PCP: Emanuel Lopez MD    Date Last Seen by PCP: 8/23/17    Current shoe gear:  Affected Foot: Tennis shoes     Unaffected Foot: Tennis shoes    Hemoglobin A1C   Date Value Ref Range Status   02/21/2017 9.0 (H) 4.5 - 6.2 % Final     Comment:     According to ADA guidelines, hemoglobin A1C <7.0% represents  optimal control in non-pregnant diabetic patients.  Different  metrics may apply to specific populations.   Standards of Medical Care in Diabetes - 2016.  For the purpose of screening for the presence of diabetes:  <5.7%     Consistent with the absence of diabetes  5.7-6.4%  Consistent with increasing risk for diabetes   (prediabetes)  >or=6.5%  Consistent with diabetes  Currently no consensus exists for use of hemoglobin A1C  for diagnosis of diabetes for children.     10/04/2016 13.9 (H) 4.5 - 6.2 % Final     Comment:     According to ADA guidelines, hemoglobin A1C <7.0% represents  optimal control in non-pregnant diabetic patients.   "Different  metrics may apply to specific populations.   Standards of Medical Care in Diabetes - 2016.  For the purpose of screening for the presence of diabetes:  <5.7%     Consistent with the absence of diabetes  5.7-6.4%  Consistent with increasing risk for diabetes   (prediabetes)  >or=6.5%  Consistent with diabetes  Currently no consensus exists for use of hemoglobin A1C  for diagnosis of diabetes for children.     09/06/2016 8.3 (H) 4.5 - 6.2 % Final     Comment:     According to ADA guidelines, hemoglobin A1C <7.0% represents  optimal control in non-pregnant diabetic patients.  Different  metrics may apply to specific populations.   Standards of Medical Care in Diabetes - 2016.  For the purpose of screening for the presence of diabetes:  <5.7%     Consistent with the absence of diabetes  5.7-6.4%  Consistent with increasing risk for diabetes   (prediabetes)  >or=6.5%  Consistent with diabetes  Currently no consensus exists for use of hemoglobin A1C  for diagnosis of diabetes for children.       Vitals:    08/28/17 0911   BP: 129/76   Pulse: 71   Weight: 122.9 kg (271 lb)   Height: 6' 1" (1.854 m)   PainSc:   5   PainLoc: Foot      Past Medical History:   Diagnosis Date    Anemia     Anxiety     Chronic hepatitis C virus infection - RELAPSE following harvoni + RBV 8/23/2011    Completed 12 weeks Harvoni + RBV w/ RELAPSE 6 weeks out (3/2016)  Wk 1 Hgb 12.4, prograf 6.8 Wk 2 Hgb 12.7 Wk 4 Hgb 12.8, prograf 5.4; HCV RNA 47. INCREASE RBV to 600 Wk 5 Hgb 12.6 Wk 6 Hgb 12.4, HCV RNA <12, detected. INCREASE  Wk 7 Hgb 11.8 Wk 8 Hgb 11.7, prograf 4.0. INCREASE RBV 1000, INCREASE PROGRAF 2/1 Wk 9 Hgb 12.4, prograf 3.6  HCV RNA <12, detected. INCREASE PROGRAF 2/2 Wk 10 hgb     Chronic pain syndrome 7/13/2011    CKD (chronic kidney disease), stage III     Diabetes mellitus type II, uncontrolled     Discitis of lumbosacral region 1/16/2015    ED (erectile dysfunction)     Genital herpes     Gout, arthritis  "    History of alcohol abuse     History of positive PPD, treatment status unknown     Pulmonary granulomas, negative sputum cultures for AFB and indeterminate quantferon test    History of substance abuse     Hypertension     Hypothyroidism     Peptic ulcer disease        Past Surgical History:   Procedure Laterality Date    CHOLECYSTECTOMY      LIVER TRANSPLANT  06/2010    SPINE SURGERY         Family History   Problem Relation Age of Onset    Cancer Mother     Diabetes Mother     Heart disease Mother     Diabetes Sister     Cancer Maternal Uncle 82     colon CA    Melanoma Neg Hx     Psoriasis Neg Hx     Lupus Neg Hx     Eczema Neg Hx     Acne Neg Hx        Social History     Social History    Marital status:      Spouse name: N/A    Number of children: N/A    Years of education: N/A     Social History Main Topics    Smoking status: Former Smoker    Smokeless tobacco: Never Used    Alcohol use No      Comment: over 5 years ago, none currently    Drug use: No    Sexual activity: Not Asked     Other Topics Concern    None     Social History Narrative    None       Current Outpatient Prescriptions   Medication Sig Dispense Refill    allopurinol (ZYLOPRIM) 100 MG tablet Take 1 tablet (100 mg total) by mouth 2 (two) times daily. 60 tablet 11    amitriptyline (ELAVIL) 25 MG tablet Take 1 tablet (25 mg total) by mouth every evening. For nerve pain and sleep 30 tablet 11    blood sugar diagnostic Strp 1 strip by Misc.(Non-Drug; Combo Route) route 4 (four) times daily before meals and nightly. 100 strip 11    blood-glucose meter Misc 1 each by Misc.(Non-Drug; Combo Route) route 4 (four) times daily before meals and nightly. 1 each 0    calcium carbonate-vitamin D3 (CALTRATE 600 + D) 600 mg(1,500mg) -400 unit Chew       EPCLUSA 400-100 mg Tab Take 1 tablet by mouth once daily. 28 tablet 5    famotidine (PEPCID) 40 MG tablet Take 1 tablet (40 mg total) by mouth once daily. 30 tablet  "2    gabapentin (NEURONTIN) 600 MG tablet Take 1 tablet (600 mg total) by mouth 4 (four) times daily. 120 tablet 2    insulin aspart (NOVOLOG) 100 unit/mL InPn pen Inject 12 Units into the skin 3 (three) times daily with meals. 10.8 mL 11    insulin detemir (LEVEMIR FLEXTOUCH) 100 unit/mL (3 mL) SubQ InPn pen Inject 55 Units into the skin every evening. 15 mL 11    lancets Misc 1 each by Misc.(Non-Drug; Combo Route) route 4 (four) times daily before meals and nightly. 200 each 11    lancing device Misc 1 each by Misc.(Non-Drug; Combo Route) route 4 (four) times daily before meals and nightly. 1 each 0    levothyroxine (SYNTHROID) 88 MCG tablet TAKE 1 TABLET (88 MCG TOTAL) BY MOUTH ONCE DAILY. 30 tablet 6    lisinopril 10 MG tablet Take 1 tablet (10 mg total) by mouth once daily. 90 tablet 2    metoprolol tartrate (LOPRESSOR) 50 MG tablet TAKE 3 TABLETS BY MOUTH 2 (TWO) TIMES DAILY. 180 tablet 3    multivitamin (THERAGRAN) per tablet Take 1 tablet by mouth.      nifedipine (ADALAT CC) 60 MG TbSR Take 1 tablet (60 mg total) by mouth once daily. 90 tablet 11    pen needle, diabetic (BD ULTRA-FINE MENDY PEN NEEDLES) 32 gauge x 5/32" Ndle Use with aspart TIDWM and with detemir  each 12    ribavirin (COPEGUS) 200 MG tablet Take as directed. Initial dose will be 3 tablets by PO once daily. Will increase as tolerated to 2 tablets PO  tablet 5    sertraline (ZOLOFT) 100 MG tablet Take 1 tablet (100 mg total) by mouth once daily. 30 tablet 11    tacrolimus (PROGRAF) 1 MG Cap Take 1 capsule (1 mg total) by mouth every 12 (twelve) hours. Liver Transplant Z94.4 60 capsule 11    sildenafil (VIAGRA) 100 MG tablet Take 1 tablet (100 mg total) by mouth daily as needed for Erectile Dysfunction. 30 tablet 11     No current facility-administered medications for this visit.        Review of patient's allergies indicates:   Allergen Reactions    Naproxen      Other reaction(s): problems w/liver/kid    " Oxaprozin      Other reaction(s): cause problems w/kid/liver         Review of Systems   Constitution: Negative for chills, decreased appetite and fever.   Cardiovascular: Negative for chest pain, claudication and leg swelling.   Respiratory: Negative for cough.    Skin: Positive for dry skin and nail changes.   Musculoskeletal: Negative for joint pain, joint swelling and myalgias.   Gastrointestinal: Negative for nausea and vomiting.   Neurological: Positive for numbness and paresthesias.   Psychiatric/Behavioral: Negative for altered mental status.           Objective:      Physical Exam   Constitutional: He appears well-developed.   Cardiovascular:   Dorsalis pedis and posterior tibial pulses are nonpalpable Bilaterally. Toes are cool to touch. Feet are warm proximally.There is decreased digital hair. Skin is atrophic, slightly hyperpigmented, and mildly edematous    Pulmonary/Chest: No respiratory distress.   Musculoskeletal: He exhibits tenderness. He exhibits no edema.   Adequate joint range of motion without pain, limitation, nor crepitation Bilateral feet and ankle joints. Muscle strength is 5/5 in all groups bilaterally.    Feet:   Right Foot:   Protective Sensation: 10 sites tested. 8 sites sensed.   Skin Integrity: Positive for callus and dry skin. Negative for ulcer or skin breakdown.   Left Foot:   Protective Sensation: 10 sites tested. 7 sites sensed.   Skin Integrity: Positive for dry skin. Negative for ulcer.   Neurological: He is alert.   Ainsworth-Mendy 5.07 monofilamant testing is diminished Drew feet. Sharp/dull sensation diminished Bilaterally. Light touch absent Bilaterally.    Skin: Skin is dry. No erythema.   Nails x10 are elongated by 2-6mm's, thickened by 3-5 mm's, dystrophic, and are darkened in coloration . Xerosis Bilaterally. No open lesions noted   Right hallux nail noted with dried sanguinous drainage, spicule of nail noted to right hallux medial nail border.      Psychiatric: He has  a normal mood and affect.             Assessment:       Encounter Diagnoses   Name Primary?    Uncontrolled type 2 diabetes mellitus with peripheral circulatory disorder Yes    Uncontrolled type 2 diabetes mellitus with polyneuropathy     Hammer toes of both feet     Wound, open, toe, initial encounter          Plan:       Diagnoses and all orders for this visit:    Uncontrolled type 2 diabetes mellitus with peripheral circulatory disorder  -     Ambulatory Referral to Cardiology  -     DIABETIC SHOES FOR HOME USE    Uncontrolled type 2 diabetes mellitus with polyneuropathy  -     DIABETIC SHOES FOR HOME USE    Hammer toes of both feet  -     DIABETIC SHOES FOR HOME USE    Wound, open, toe, initial encounter  -     DIABETIC SHOES FOR HOME USE      I counseled the patient on his conditions, their implications and medical management.      - Shoe inspection. Diabetic Foot Education. Patient reminded of the importance of good nutrition and blood sugar control to help prevent podiatric complications of diabetes. Patient instructed on proper foot hygeine. We discussed wearing proper shoe gear, daily foot inspections, never walking without protective shoe gear, never putting sharp instruments to feet, routine podiatric nail visits every 2-3 months.   - With patient's permission, nails were aggressively reduced and debrided x 10 to their soft tissue attachment mechanically removing all offending nail and debris. Patient relates relief following the procedure. He will continue to monitor the areas daily, inspect his feet, wear protective shoe gear when ambulatory, moisturizer to maintain skin integrity and follow in this office in approximately 2-3 months, sooner p.r.n.     - Patient requires diabetic shoes due to high risk for ulceration and /or amputation. Patient was dispensed a prescription for diabetic shoes with one pair of custom molded inserts. A list of potential orthotist was also provided    - F/u 3  carolin Boyle DPM PGY-3    I have personally taken the history and examined this patient and agree with the resident's note as stated as above.   Sony Oreilly DPM, FACFAS

## 2017-08-28 NOTE — TELEPHONE ENCOUNTER
----- Message from Richelle Gary sent at 8/28/2017  1:23 PM CDT -----  Contact: wife/Cely  [phone]  Pt wife states the shoe store for the diabetic shoes need the clinical notes faxed to [phone].   Please advise

## 2017-08-28 NOTE — PATIENT INSTRUCTIONS
Diabetic Foot Care  Diabetes can lead to a number of foot complications. Fortunately, you can prevent most of these with a little extra foot care. If diabetes is not well controlled, it can cause damage to blood vessels and result in poor circulation to the foot. When the skin does not get enough blood flow, it becomes prone to pressure sores and ulcers, which heal slowly.  Diabetes can also damage nerves, interfering with the ability to feel pain and pressure. When you cant feel your foot normally, it is easy to injure your skin, bones, and joints without knowing it. For these reasons diabetes increases the risk of fungal infections, bunions, and ulcers. An ulcer is a sore or break in the skin. With ulcers, often the skin seems to have worn away. Deep ulcers can lead to bone infection.  Gangrene is the most serious foot complication of diabetes. It usually occurs on the tips of the toes as blackened areas of skin. The black area is dead tissue. In severe cases, gangrene spreads to involve the entire toe, other toes, and the entire foot. Foot or toe amputation may be required. Good foot care and blood sugar control can prevent this.  Home care  · Wear comfortable, well-fitting shoes.  · Wash your feet daily with warm water and mild soap.  · After drying, apply a moisturizing cream or lotion to the top and bottom of your feet. Don't put lotion between toes.  · Check your feet daily for skin breaks, blisters, swelling, or redness. Look between your toes as well. If you cannot see the bottoms of your feet, ask someone to look or use a mirror.  · Wear cotton socks and change them every day.  · Trim toenails carefully, and do not cut your cuticles.  · Strive to keep your blood sugar under control with a combination of medicines, diet, and activity.  · If you smoke and have diabetes, it is very important that you stop. Smoking reduces blood flow to your foot.  · Schedule foot exams at least every year, or more often if  you have foot problems.  · Put your feet up when sitting, wiggle toes, and move ankles to help improve blood flow.  Avoid activities that increase your risk of foot injury:  · Do not walk barefoot.  · Do not use heating pads or hot water bottles on your feet.  · Do not put your foot in a hot tub without first checking the temperature with your hand.  Follow-up care  Follow up with your healthcare provider, or as advised. Be sure to take off your shoes and socks before your appointment starts so your healthcare provider will be sure to check your feet. Report any cut, puncture, scrape, blister, or other injury to your foot. Also report if you have a bunion, hammertoes, ingrown toenail, or ulcer on your foot.  When to seek medical advice  Call your healthcare provider right away if any of these occur:  · Black skin color anywhere on the foot  · Open ulcer with pus draining from the wound  · Increasing foot or leg pain  · New areas of redness or swelling or tender areas of the foot  · Fever of 100.4°F (38°C) or greater  Date Last Reviewed: 5/25/2016  © 2163-6241 [employer]. [address] [place] [address], [place] [address]. All rights reserved. This information is not intended as a substitute for professional medical care. Always follow your healthcare professional's instructions.

## 2017-08-30 ENCOUNTER — OFFICE VISIT (OUTPATIENT)
Dept: CARDIOLOGY | Facility: CLINIC | Age: 62
End: 2017-08-30
Payer: MEDICARE

## 2017-08-30 ENCOUNTER — OFFICE VISIT (OUTPATIENT)
Dept: FAMILY MEDICINE | Facility: CLINIC | Age: 62
End: 2017-08-30
Payer: MEDICARE

## 2017-08-30 VITALS
HEIGHT: 73 IN | BODY MASS INDEX: 34.85 KG/M2 | DIASTOLIC BLOOD PRESSURE: 70 MMHG | SYSTOLIC BLOOD PRESSURE: 126 MMHG | WEIGHT: 263 LBS

## 2017-08-30 VITALS — WEIGHT: 269.81 LBS | HEIGHT: 74 IN | BODY MASS INDEX: 34.63 KG/M2

## 2017-08-30 DIAGNOSIS — N18.30 CKD (CHRONIC KIDNEY DISEASE), STAGE III: ICD-10-CM

## 2017-08-30 DIAGNOSIS — I99.8 ISCHEMIC LEG: Primary | ICD-10-CM

## 2017-08-30 DIAGNOSIS — I73.9 PAD (PERIPHERAL ARTERY DISEASE): ICD-10-CM

## 2017-08-30 DIAGNOSIS — I10 ESSENTIAL HYPERTENSION: ICD-10-CM

## 2017-08-30 DIAGNOSIS — E78.1 HYPERTRIGLYCERIDEMIA: ICD-10-CM

## 2017-08-30 LAB
HCV LOG: <1.08 LOG (10) IU/ML
HCV RNA QUANT PCR: <12 IU/ML
HCV, QUALITATIVE: NOT DETECTED IU/ML

## 2017-08-30 PROCEDURE — 3074F SYST BP LT 130 MM HG: CPT | Mod: ,,, | Performed by: INTERNAL MEDICINE

## 2017-08-30 PROCEDURE — 3045F PR MOST RECENT HEMOGLOBIN A1C LEVEL 7.0-9.0%: CPT | Mod: ,,, | Performed by: FAMILY MEDICINE

## 2017-08-30 PROCEDURE — 3074F SYST BP LT 130 MM HG: CPT | Mod: ,,, | Performed by: FAMILY MEDICINE

## 2017-08-30 PROCEDURE — 99999 PR PBB SHADOW E&M-EST. PATIENT-LVL II: CPT | Mod: PBBFAC,,, | Performed by: FAMILY MEDICINE

## 2017-08-30 PROCEDURE — 99999 PR PBB SHADOW E&M-EST. PATIENT-LVL III: CPT | Mod: PBBFAC,,, | Performed by: INTERNAL MEDICINE

## 2017-08-30 PROCEDURE — 99213 OFFICE O/P EST LOW 20 MIN: CPT | Mod: PBBFAC,PO | Performed by: INTERNAL MEDICINE

## 2017-08-30 PROCEDURE — 3078F DIAST BP <80 MM HG: CPT | Mod: ,,, | Performed by: INTERNAL MEDICINE

## 2017-08-30 PROCEDURE — 99214 OFFICE O/P EST MOD 30 MIN: CPT | Mod: S$PBB,,, | Performed by: FAMILY MEDICINE

## 2017-08-30 PROCEDURE — 99212 OFFICE O/P EST SF 10 MIN: CPT | Mod: PBBFAC,27,PO | Performed by: FAMILY MEDICINE

## 2017-08-30 PROCEDURE — 99214 OFFICE O/P EST MOD 30 MIN: CPT | Mod: S$PBB,,, | Performed by: INTERNAL MEDICINE

## 2017-08-30 PROCEDURE — 3078F DIAST BP <80 MM HG: CPT | Mod: ,,, | Performed by: FAMILY MEDICINE

## 2017-08-30 RX ORDER — SODIUM CHLORIDE 9 MG/ML
INJECTION, SOLUTION INTRAVENOUS CONTINUOUS
Status: CANCELLED | OUTPATIENT
Start: 2017-08-30

## 2017-08-30 NOTE — PROGRESS NOTES
Subjective:   Patient ID:  Vick Gonzalez is a 61 y.o. male who presents for evaluation of No chief complaint on file.      HPI: 62 y/o AA male who was seen before by Dr. Lezama is here for f/u of leg pain that have been worsening. Pain is worsened with walking. Pain occurs at rest but no ulcers. He has uncontrolled DM diagnosed 6 years ago. He does not smoke. He is unable to walk > 200 yards secondary to pain located in foot and calves. Pain is sharp/aching in quality. He had US of legs done in 2014 that showed significant disease. NORMA was inconclusive and it was thought pain may be secondary to immuno-suppressive medications. BP is controlled.    Past Medical History:   Diagnosis Date    Anemia     Anxiety     Chronic hepatitis C virus infection - RELAPSE following harvoni + RBV 8/23/2011    Completed 12 weeks Harvoni + RBV w/ RELAPSE 6 weeks out (3/2016)  Wk 1 Hgb 12.4, prograf 6.8 Wk 2 Hgb 12.7 Wk 4 Hgb 12.8, prograf 5.4; HCV RNA 47. INCREASE RBV to 600 Wk 5 Hgb 12.6 Wk 6 Hgb 12.4, HCV RNA <12, detected. INCREASE  Wk 7 Hgb 11.8 Wk 8 Hgb 11.7, prograf 4.0. INCREASE RBV 1000, INCREASE PROGRAF 2/1 Wk 9 Hgb 12.4, prograf 3.6  HCV RNA <12, detected. INCREASE PROGRAF 2/2 Wk 10 hgb     Chronic pain syndrome 7/13/2011    CKD (chronic kidney disease), stage III     Diabetes mellitus type II, uncontrolled     Discitis of lumbosacral region 1/16/2015    ED (erectile dysfunction)     Genital herpes     Gout, arthritis     History of alcohol abuse     History of positive PPD, treatment status unknown     Pulmonary granulomas, negative sputum cultures for AFB and indeterminate quantferon test    History of substance abuse     Hypertension     Hypothyroidism     Peptic ulcer disease        Past Surgical History:   Procedure Laterality Date    CHOLECYSTECTOMY      LIVER TRANSPLANT  06/2010    SPINE SURGERY         Social History   Substance Use Topics    Smoking status: Former Smoker    Smokeless  tobacco: Never Used    Alcohol use No      Comment: over 5 years ago, none currently       Family History   Problem Relation Age of Onset    Cancer Mother     Diabetes Mother     Heart disease Mother     Diabetes Sister     Cancer Maternal Uncle 82     colon CA    Melanoma Neg Hx     Psoriasis Neg Hx     Lupus Neg Hx     Eczema Neg Hx     Acne Neg Hx        Patient's Medications   New Prescriptions    No medications on file   Previous Medications    ALLOPURINOL (ZYLOPRIM) 100 MG TABLET    Take 1 tablet (100 mg total) by mouth 2 (two) times daily.    AMITRIPTYLINE (ELAVIL) 25 MG TABLET    Take 1 tablet (25 mg total) by mouth every evening. For nerve pain and sleep    BLOOD SUGAR DIAGNOSTIC STRP    1 strip by Misc.(Non-Drug; Combo Route) route 4 (four) times daily before meals and nightly.    BLOOD-GLUCOSE METER MISC    1 each by Misc.(Non-Drug; Combo Route) route 4 (four) times daily before meals and nightly.    CALCIUM CARBONATE-VITAMIN D3 (CALTRATE 600 + D) 600 MG(1,500MG) -400 UNIT CHEW        EPCLUSA 400-100 MG TAB    Take 1 tablet by mouth once daily.    FAMOTIDINE (PEPCID) 40 MG TABLET    Take 1 tablet (40 mg total) by mouth once daily.    GABAPENTIN (NEURONTIN) 600 MG TABLET    Take 1 tablet (600 mg total) by mouth 4 (four) times daily.    INSULIN ASPART (NOVOLOG) 100 UNIT/ML INPN PEN    Inject 12 Units into the skin 3 (three) times daily with meals.    INSULIN DETEMIR (LEVEMIR FLEXTOUCH) 100 UNIT/ML (3 ML) SUBQ INPN PEN    Inject 55 Units into the skin every evening.    LANCETS MISC    1 each by Misc.(Non-Drug; Combo Route) route 4 (four) times daily before meals and nightly.    LANCING DEVICE MISC    1 each by Misc.(Non-Drug; Combo Route) route 4 (four) times daily before meals and nightly.    LEVOTHYROXINE (SYNTHROID) 88 MCG TABLET    TAKE 1 TABLET (88 MCG TOTAL) BY MOUTH ONCE DAILY.    LISINOPRIL 10 MG TABLET    Take 1 tablet (10 mg total) by mouth once daily.    METOPROLOL TARTRATE (LOPRESSOR)  "50 MG TABLET    TAKE 3 TABLETS BY MOUTH 2 (TWO) TIMES DAILY.    MULTIVITAMIN (THERAGRAN) PER TABLET    Take 1 tablet by mouth.    NIFEDIPINE (ADALAT CC) 60 MG TBSR    Take 1 tablet (60 mg total) by mouth once daily.    PEN NEEDLE, DIABETIC (BD ULTRA-FINE MENDY PEN NEEDLES) 32 GAUGE X 5/32" NDLE    Use with aspart TIDWM and with detemir QHS    RIBAVIRIN (COPEGUS) 200 MG TABLET    Take as directed. Initial dose will be 3 tablets by PO once daily. Will increase as tolerated to 2 tablets PO BID    SERTRALINE (ZOLOFT) 100 MG TABLET    Take 1 tablet (100 mg total) by mouth once daily.    SILDENAFIL (VIAGRA) 100 MG TABLET    Take 1 tablet (100 mg total) by mouth daily as needed for Erectile Dysfunction.    TACROLIMUS (PROGRAF) 1 MG CAP    Take 1 capsule (1 mg total) by mouth every 12 (twelve) hours. Liver Transplant Z94.4   Modified Medications    No medications on file   Discontinued Medications    No medications on file        Review of patient's allergies indicates:   Allergen Reactions    Naproxen      Other reaction(s): problems w/liver/kid    Oxaprozin      Other reaction(s): cause problems w/kid/liver       Review of Systems   Constitution: Negative.   HENT: Negative.    Eyes: Negative.    Respiratory: Negative.    Endocrine: Negative.    Hematologic/Lymphatic: Negative.    Skin: Negative.    Musculoskeletal: Negative.    Gastrointestinal: Negative.    Neurological: Negative.    Psychiatric/Behavioral: Negative.      Objective:   Physical Exam   Constitutional: He is oriented to person, place, and time. He appears well-developed and well-nourished. No distress.   Examination of the digits showed no clubbing or cyanosis   HENT:   Head: Normocephalic and atraumatic.   Eyes: Conjunctivae are normal. Pupils are equal, round, and reactive to light. Right eye exhibits no discharge.   Neck: Normal range of motion. Neck supple. No JVD present. No thyromegaly present.   No carotid bruits   Cardiovascular: Normal rate, " regular rhythm, S1 normal, S2 normal, normal heart sounds and intact distal pulses.  PMI is not displaced.  Exam reveals no gallop, no friction rub and no opening snap.    No murmur heard.  Pulses:       Dorsalis pedis pulses are 0 on the right side, and 0 on the left side.        Posterior tibial pulses are 0 on the right side, and 1+ on the left side.   Pulmonary/Chest: Effort normal and breath sounds normal. No respiratory distress. He has no wheezes. He has no rales. He exhibits no tenderness.   Abdominal: Soft. Bowel sounds are normal. He exhibits no distension and no mass. There is no tenderness. There is no guarding.   No hepatosplenomegaly   Musculoskeletal: Normal range of motion. He exhibits no edema or tenderness.   Lymphadenopathy:     He has no cervical adenopathy.   Neurological: He is alert and oriented to person, place, and time.   Skin: Skin is warm. No rash noted. He is not diaphoretic. No erythema.   Psychiatric: He has a normal mood and affect.   Nursing note and vitals reviewed.      Lab Results   Component Value Date    WBC 9.22 08/28/2017    HGB 13.0 (L) 08/28/2017    HCT 38.9 (L) 08/28/2017    MCV 86 08/28/2017     08/28/2017         Chemistry        Component Value Date/Time     08/28/2017 0749    K 4.5 08/28/2017 0749     08/28/2017 0749    CO2 22 (L) 08/28/2017 0749    BUN 17 08/28/2017 0749    CREATININE 1.3 08/28/2017 0749     (H) 08/28/2017 0749        Component Value Date/Time    CALCIUM 9.1 08/28/2017 0749    ALKPHOS 92 08/28/2017 0749    AST 18 08/28/2017 0749    ALT 20 08/28/2017 0749    BILITOT 0.5 08/28/2017 0749    ESTGFRAFRICA >60.0 08/28/2017 0749    EGFRNONAA 59.7 (A) 08/28/2017 0749            Lab Results   Component Value Date    CHOL 139 03/27/2017    CHOL 97 (L) 10/06/2016    CHOL 134 09/06/2016     Lab Results   Component Value Date    HDL 25 (L) 03/27/2017    HDL 21 (L) 10/06/2016    HDL 23 (L) 09/06/2016     Lab Results   Component Value Date     LDLCALC 53.0 (L) 03/27/2017    LDLCALC 20.8 (L) 10/06/2016    LDLCALC Invalid, Trig>400.0 09/06/2016     Lab Results   Component Value Date    TRIG 305 (H) 03/27/2017    TRIG 276 (H) 10/06/2016    TRIG 574 (H) 09/06/2016     Lab Results   Component Value Date    CHOLHDL 18.0 (L) 03/27/2017    CHOLHDL 21.6 10/06/2016    CHOLHDL 17.2 (L) 09/06/2016       Lab Results   Component Value Date    TSH 3.190 02/27/2017       Lab Results   Component Value Date    HGBA1C 9.0 (H) 02/21/2017       ECGs reviewed-NSR  LABS reviewed      Assessment:     1. Essential hypertension    2. Ischemic leg    3. PAD (peripheral artery disease)    4. CKD (chronic kidney disease), stage III    5. Hypertriglyceridemia        Plan:     Patient is having resting pain with Severe PAD. Unable to walk 100 yards.  Will schedule for angiogram of legs.  Will consider starting zetia for high triglyceride. Unable to take statin consider liver history.  F/u in 4 weeks.    Clinic time spent with this patient is 40 minutes.

## 2017-08-30 NOTE — PATIENT INSTRUCTIONS
Your Levemir dose should be 55 units daily to start.        Diabetes:  Guideline for managing Levemir insulin      1. Check your fasting blood sugars every morning for 3 days. Goal is to have most of your fasting blood sugars below 140.     2. If most of your fasting blood sugars in that 3 day period are above 140, increase your Levemir dose by 3 units.     3. If most of your fasting blood sugars in that 3 day period of are below 140 but higher than 70, keep your Levemir dose the same.     4. If most of your fasting blood sugars in that 3 day period are below 70, decrease your Levemir dose by 3 units.     5. Make sure to write your blood sugars down in a notebook; do not simply just store the numbers in your glucose meter.           Novolog: keep at 12 units with meals now     Sliding Scale for Novolog      Blood Glucose reading : how many extra units of Novolog to take  150-200 : +2  200-250  : +4  250-300  : +6  300-350  :  +8  >350   : +10     SAMPLE BLOOD SUGAR CHART        DATE              Before breakfast Before lunch Before dinner Before bedtime

## 2017-08-30 NOTE — LETTER
August 30, 2017      Sony Oreilly, DPM  [address] [address]           [facility] Cardiology  [address], [address]  [place] [address]  Phone: [phone]          Patient: Vick Gonzalez   MR Number: [id number]   YOB: 1955   Date of Visit: 8/30/2017       Dear Dr. Soyn Oreilly:    Thank you for referring Vick Gonzalez to me for evaluation. Attached you will find relevant portions of my assessment and plan of care.    If you have questions, please do not hesitate to call me. I look forward to following Vick Gonzalez along with you.    Sincerely,    Lorie Hanna MD    Enclosure  CC:  No Recipients    If you would like to receive this communication electronically, please contact [email or web address] or [phone] to request more information on [employer] access.    For providers and/or their staff who would like to refer a patient to [facility], please contact us through our one-stop-shop provider referral line, [facility], at [phone].    If you feel you have received this communication in error or would no longer like to receive these types of communications, please e-mail [email or web address]

## 2017-08-30 NOTE — PROGRESS NOTES
(Portions of this note were dictated using voice recognition software and may contain dictation related errors in spelling/grammar/syntax not found on text review)    CC:   Chief Complaint   Patient presents with    Follow-up       HPI: 61 y.o. male was seen a week ago.  Had gone into the ER for hyperglycemia.  Had discussed at his visit last time  That despite the fact that his Levemir dose was supposed be 45 units daily along with no blood 12 units with meals plus correction factor, he was in fact only taking 36 units of Levemir daily.  We discussed going back up to 45 units daily.  Significant counseling at last visit about the importance of glycemic monitoring.      He presents today with his wife.  His wife is concerned that he may not be taking the Levemir at the same time every day.  Significant counseling again spent today talking about the consistency with which she should take his basal insulin.  She has a list of some recent glucose readings.  Fasting readings are consistently elevated to 200-400 range.  There was one reading of 140 with this as an outlier per patient and his wife no hypoglycemia            Past Medical History:   Diagnosis Date    Anemia     Anxiety     Chronic hepatitis C virus infection - RELAPSE following harvoni + RBV 8/23/2011    Completed 12 weeks Harvoni + RBV w/ RELAPSE 6 weeks out (3/2016)  Wk 1 Hgb 12.4, prograf 6.8 Wk 2 Hgb 12.7 Wk 4 Hgb 12.8, prograf 5.4; HCV RNA 47. INCREASE RBV to 600 Wk 5 Hgb 12.6 Wk 6 Hgb 12.4, HCV RNA <12, detected. INCREASE  Wk 7 Hgb 11.8 Wk 8 Hgb 11.7, prograf 4.0. INCREASE RBV 1000, INCREASE PROGRAF 2/1 Wk 9 Hgb 12.4, prograf 3.6  HCV RNA <12, detected. INCREASE PROGRAF 2/2 Wk 10 hgb     Chronic pain syndrome 7/13/2011    CKD (chronic kidney disease), stage III     Diabetes mellitus type II, uncontrolled     Discitis of lumbosacral region 1/16/2015    ED (erectile dysfunction)     Genital herpes     Gout, arthritis     History of  alcohol abuse     History of positive PPD, treatment status unknown     Pulmonary granulomas, negative sputum cultures for AFB and indeterminate quantferon test    History of substance abuse     Hypertension     Hypothyroidism     Peptic ulcer disease        Past Surgical History:   Procedure Laterality Date    CHOLECYSTECTOMY      LIVER TRANSPLANT  06/2010    SPINE SURGERY         Family History   Problem Relation Age of Onset    Cancer Mother     Diabetes Mother     Heart disease Mother     Diabetes Sister     Cancer Maternal Uncle 82     colon CA    Melanoma Neg Hx     Psoriasis Neg Hx     Lupus Neg Hx     Eczema Neg Hx     Acne Neg Hx        Social History     Social History    Marital status:      Spouse name: N/A    Number of children: N/A    Years of education: N/A     Occupational History    Not on file.     Social History Main Topics    Smoking status: Former Smoker    Smokeless tobacco: Never Used    Alcohol use No      Comment: over 5 years ago, none currently    Drug use: No    Sexual activity: Not on file     Other Topics Concern    Not on file     Social History Narrative    No narrative on file     Lab Results   Component Value Date    WBC 9.22 08/28/2017    HGB 13.0 (L) 08/28/2017    HCT 38.9 (L) 08/28/2017     08/28/2017    CHOL 139 03/27/2017    TRIG 305 (H) 03/27/2017    HDL 25 (L) 03/27/2017    ALT 20 08/28/2017    AST 18 08/28/2017     08/28/2017    K 4.5 08/28/2017     08/28/2017    CREATININE 1.3 08/28/2017    BUN 17 08/28/2017    CO2 22 (L) 08/28/2017    TSH 3.190 02/27/2017    PSA 0.30 09/06/2016    INR 1.0 02/27/2017    HGBA1C 9.0 (H) 02/21/2017    LDLCALC 53.0 (L) 03/27/2017     (H) 08/28/2017           IMPRESSION    1. Uncontrolled type 2 diabetes mellitus with diabetic neuropathy, with long-term current use of insulin          PLAN  25 minute office visit, majority of time relating to counseling    We will increase Levemir 55  units as a base.  Keep NovoLog at 12 units plus correction factor with meals.  Assessed patient's understanding of our new instructions.  Wife is also understanding of these instructions.  Self titration protocol given every 3 days that blood sugar is over 140 fasting, in that patient should increase his Levemir by 3 units.    I will see him back in one month, October 3, 2017.  Return sooner if needed.  Notify for any other concerns

## 2017-08-31 ENCOUNTER — OFFICE VISIT (OUTPATIENT)
Dept: PAIN MEDICINE | Facility: CLINIC | Age: 62
End: 2017-08-31
Payer: MEDICARE

## 2017-08-31 VITALS
SYSTOLIC BLOOD PRESSURE: 152 MMHG | HEIGHT: 74 IN | BODY MASS INDEX: 34.52 KG/M2 | DIASTOLIC BLOOD PRESSURE: 100 MMHG | TEMPERATURE: 99 F | HEART RATE: 101 BPM | WEIGHT: 268.94 LBS | RESPIRATION RATE: 18 BRPM

## 2017-08-31 DIAGNOSIS — M96.1 POSTLAMINECTOMY SYNDROME OF LUMBAR REGION: Primary | ICD-10-CM

## 2017-08-31 DIAGNOSIS — Z51.81 ENCOUNTER FOR MONITORING OPIOID MAINTENANCE THERAPY: ICD-10-CM

## 2017-08-31 DIAGNOSIS — G89.4 CHRONIC PAIN SYNDROME: ICD-10-CM

## 2017-08-31 DIAGNOSIS — F11.90 CHRONIC, CONTINUOUS USE OF OPIOIDS: ICD-10-CM

## 2017-08-31 DIAGNOSIS — M43.10 ACQUIRED SPONDYLOLISTHESIS: ICD-10-CM

## 2017-08-31 DIAGNOSIS — M54.16 LUMBAR RADICULOPATHY: ICD-10-CM

## 2017-08-31 DIAGNOSIS — Z79.891 ENCOUNTER FOR MONITORING OPIOID MAINTENANCE THERAPY: ICD-10-CM

## 2017-08-31 PROCEDURE — 3080F DIAST BP >= 90 MM HG: CPT | Mod: ,,, | Performed by: NURSE PRACTITIONER

## 2017-08-31 PROCEDURE — 99214 OFFICE O/P EST MOD 30 MIN: CPT | Mod: S$PBB,,, | Performed by: NURSE PRACTITIONER

## 2017-08-31 PROCEDURE — 99999 PR PBB SHADOW E&M-EST. PATIENT-LVL III: CPT | Mod: PBBFAC,,, | Performed by: NURSE PRACTITIONER

## 2017-08-31 PROCEDURE — 3077F SYST BP >= 140 MM HG: CPT | Mod: ,,, | Performed by: NURSE PRACTITIONER

## 2017-08-31 PROCEDURE — 99213 OFFICE O/P EST LOW 20 MIN: CPT | Mod: PBBFAC | Performed by: NURSE PRACTITIONER

## 2017-08-31 RX ORDER — OXYCODONE HYDROCHLORIDE 15 MG/1
15 TABLET ORAL 4 TIMES DAILY PRN
Qty: 120 TABLET | Refills: 0 | Status: ON HOLD | OUTPATIENT
Start: 2017-10-27 | End: 2017-09-06 | Stop reason: HOSPADM

## 2017-08-31 RX ORDER — OXYCODONE HYDROCHLORIDE 15 MG/1
15 TABLET ORAL 4 TIMES DAILY PRN
Qty: 120 TABLET | Refills: 0 | Status: ON HOLD | OUTPATIENT
Start: 2017-08-31 | End: 2017-09-06 | Stop reason: HOSPADM

## 2017-08-31 RX ORDER — OXYCODONE HYDROCHLORIDE 15 MG/1
15 TABLET ORAL 4 TIMES DAILY PRN
Qty: 120 TABLET | Refills: 0 | Status: SHIPPED | OUTPATIENT
Start: 2017-09-29 | End: 2017-11-30 | Stop reason: SDUPTHER

## 2017-08-31 NOTE — PROGRESS NOTES
Chronic patient Established Note (Follow up visit)      SUBJECTIVE:    Vick Gonzalez presents to the clinic for a follow-up appointment for lower back pain. He continues to report low back pain that radiates down the back and side of his right leg to the top of his foot. He has had multiple procedures in the past with limited relief. He is not interested in further procedures at this time. He continues to take Roxicodone 15 mg QID PRN and Gabapentin with benefit and without adverse effects. He does report recent blood sugar issues that he is trying to get under control. He denies any bowel or bladder incontinence.  His pain today is 6/10.      Of note, the patient has a history of previous liver transplant and is monitored by hepatology.  His LFTs are chronically elevated which is believed to be due to HCV.    Pain Disability Index Review:  Last 3 PDI Scores 5/31/2017 3/1/2017 11/30/2016   Pain Disability Index (PDI) 27 40 0       Pain Medications:    - Opioids: Roxicodone (Oxycodone/Acetaminophen) 15 mg QID PRN pain    Others: Gabapentin 2400 mg daily    Opioid Contract: yes     report:  Reviewed and consistent with medication use as prescribed.    Pain Procedures:   1/15/15 Right TFESI @ L5 & S1     Physical Therapy/Home Exercise: no    Imaging:     Lumbar MRI 6/16/15  Narrative   Technique: Routine lumbar spine MRI performed without contrast.    Comparison: MRI 06/16/2015, CT 06/15/2015.    Findings:    There are postsurgical changes consistent with L5-S1 posterior instrumented fusion with bilateral pedicular screws, vertical stabilizing rods and an intervertebral disk spacer.  When compared to the MRI dated 06/16/2015 00:33, there are new postsurgical   changes consistent with left L5 hemilaminectomy.  There is a 2.5 cm ill-defined fluid collection at the site of hemilaminectomy that is not well evaluated due to the lack of IV contrast but appears to extend anteriorly into the spinal canal and into the    left foraminal zone.  There is as possible small fluid collection within the anterior right epidural space that may also due to compression of the adjacent spinal canal.  There is stable grade I anterolistheses of L5 on S1 with persistent high signal   intensity adjacent to the left lateral aspect of the disk spacer that demonstrates moderate associated bone marrow edema of the adjacent vertebral endplates, findings that are when compared to the previous exam.  Note is made that there is an apparent   high signal intensity within the nerve roots posterior to the L5-S1 level that was not definitely present on the previous exam.    There is mild persistent height loss loss involving the L5 vertebral body, likely degenerative in nature.  Remaining vertebral body heights are well-maintained without findings to suggest acute fracture.    There is mild intervertebral disk spacing and desiccation at L4-L5 with a small circumferential disk bulge. No disk protrusion or extrusion. There is moderate bilateral facet arthropathy and mild bilateral uncomfortable and buckling at this level.  These   findings contribute to mild spinal canal stenosis and mild bilateral foraminal narrowing.    Noting aforementioned postsurgical changes, there is persistent severe bilateral foraminal narrowing that is difficult to accurately characterize due to adjacent artifact.    There is edema anterior to the L4, L5 and S1 vertebral bodies, presumably postsurgical in nature.  No drainable fluid collections identified. Visualized retroperitoneal organs are unremarkable.   Impression       Postsurgical changes of L5-S1 posterior spinal fusion and left L5 hemilaminectomy. There is an ill-defined fluid collection at the site of hemilaminectomy that is not well evaluated due to the lack of IV contrast but appears to extend anteriorly with   suspected resulting mass effect on the spinal canal and the left foraminal zone.  There is a suspected small  fluid collection within the anterior right epidural space that may contribute to additional mass effect on the adjacent spinal canal. While   findings may represent sequela of expected postsurgical changes, continued follow-up is advised to ensure improvement and/or resolution.    Persistent abnormal high signal intensity adjacent to the left lateral aspect of the intervertebral disk spacer with moderate associated bone marrow edema of the adjacent vertebral endplates. Findings are similar when compared to the previous exam could   represent postsurgical changes. Early infection could have a similar appearance and is possible. Continued follow-up is advised.    Apparent high signal intensity within the nerve roots posterior to the L5-S1 level that was not definitely present on the previous exam. Findings may be artifactual in nature however, sequela of nerve root inflammation/edema is possible noting recent   surgery.    This report has been flagged in the Epic medical record     .  CMP  Sodium   Date Value Ref Range Status   08/28/2017 138 136 - 145 mmol/L Final     Potassium   Date Value Ref Range Status   08/28/2017 4.5 3.5 - 5.1 mmol/L Final     Chloride   Date Value Ref Range Status   08/28/2017 107 95 - 110 mmol/L Final     CO2   Date Value Ref Range Status   08/28/2017 22 (L) 23 - 29 mmol/L Final     Glucose   Date Value Ref Range Status   08/28/2017 201 (H) 70 - 110 mg/dL Final     BUN, Bld   Date Value Ref Range Status   08/28/2017 17 6 - 20 mg/dL Final     Creatinine   Date Value Ref Range Status   08/28/2017 1.3 0.5 - 1.4 mg/dL Final     Calcium   Date Value Ref Range Status   08/28/2017 9.1 8.7 - 10.5 mg/dL Final     Total Protein   Date Value Ref Range Status   08/28/2017 7.0 6.0 - 8.4 g/dL Final     Albumin   Date Value Ref Range Status   08/28/2017 3.3 (L) 3.5 - 5.2 g/dL Final     Total Bilirubin   Date Value Ref Range Status   08/28/2017 0.5 0.1 - 1.0 mg/dL Final     Comment:     For infants and  newborns, interpretation of results should be based  on gestational age, weight and in agreement with clinical  observations.  Premature Infant recommended reference ranges:  Up to 24 hours.............<8.0 mg/dL  Up to 48 hours............<12.0 mg/dL  3-5 days..................<15.0 mg/dL  6-29 days.................<15.0 mg/dL       Alkaline Phosphatase   Date Value Ref Range Status   08/28/2017 92 55 - 135 U/L Final     AST   Date Value Ref Range Status   08/28/2017 18 10 - 40 U/L Final     ALT   Date Value Ref Range Status   08/28/2017 20 10 - 44 U/L Final     Anion Gap   Date Value Ref Range Status   08/28/2017 9 8 - 16 mmol/L Final     eGFR if    Date Value Ref Range Status   08/28/2017 >60.0 >60 mL/min/1.73 m^2 Final     eGFR if non    Date Value Ref Range Status   08/28/2017 59.7 (A) >60 mL/min/1.73 m^2 Final     Comment:     Calculation used to obtain the estimated glomerular filtration  rate (eGFR) is the CKD-EPI equation. Since race is unknown   in our information system, the eGFR values for   -American and Non--American patients are given   for each creatinine result.           Allergies:   Review of patient's allergies indicates:   Allergen Reactions    Naproxen      Other reaction(s): problems w/liver/kid    Oxaprozin      Other reaction(s): cause problems w/kid/liver       Current Medications:   Current Outpatient Prescriptions   Medication Sig Dispense Refill    allopurinol (ZYLOPRIM) 100 MG tablet Take 1 tablet (100 mg total) by mouth 2 (two) times daily. 60 tablet 11    amitriptyline (ELAVIL) 25 MG tablet Take 1 tablet (25 mg total) by mouth every evening. For nerve pain and sleep 30 tablet 11    blood sugar diagnostic Strp 1 strip by Misc.(Non-Drug; Combo Route) route 4 (four) times daily before meals and nightly. 100 strip 11    blood-glucose meter Misc 1 each by Misc.(Non-Drug; Combo Route) route 4 (four) times daily before meals and nightly. 1  "each 0    calcium carbonate-vitamin D3 (CALTRATE 600 + D) 600 mg(1,500mg) -400 unit Chew       EPCLUSA 400-100 mg Tab Take 1 tablet by mouth once daily. 28 tablet 5    famotidine (PEPCID) 40 MG tablet Take 1 tablet (40 mg total) by mouth once daily. 30 tablet 2    insulin aspart (NOVOLOG) 100 unit/mL InPn pen Inject 12 Units into the skin 3 (three) times daily with meals. 10.8 mL 11    insulin detemir (LEVEMIR FLEXTOUCH) 100 unit/mL (3 mL) SubQ InPn pen Inject 55 Units into the skin every evening. 15 mL 11    lancets Misc 1 each by Misc.(Non-Drug; Combo Route) route 4 (four) times daily before meals and nightly. 200 each 11    lancing device Misc 1 each by Misc.(Non-Drug; Combo Route) route 4 (four) times daily before meals and nightly. 1 each 0    levothyroxine (SYNTHROID) 88 MCG tablet TAKE 1 TABLET (88 MCG TOTAL) BY MOUTH ONCE DAILY. 30 tablet 6    lisinopril 10 MG tablet Take 1 tablet (10 mg total) by mouth once daily. 90 tablet 2    metoprolol tartrate (LOPRESSOR) 50 MG tablet TAKE 3 TABLETS BY MOUTH 2 (TWO) TIMES DAILY. 180 tablet 3    multivitamin (THERAGRAN) per tablet Take 1 tablet by mouth.      nifedipine (ADALAT CC) 60 MG TbSR Take 1 tablet (60 mg total) by mouth once daily. 90 tablet 11    pen needle, diabetic (BD ULTRA-FINE MENDY PEN NEEDLES) 32 gauge x 5/32" Ndle Use with aspart TIDWM and with detemir  each 12    ribavirin (COPEGUS) 200 MG tablet Take as directed. Initial dose will be 3 tablets by PO once daily. Will increase as tolerated to 2 tablets PO  tablet 5    sertraline (ZOLOFT) 100 MG tablet Take 1 tablet (100 mg total) by mouth once daily. 30 tablet 11    sildenafil (VIAGRA) 100 MG tablet Take 1 tablet (100 mg total) by mouth daily as needed for Erectile Dysfunction. 30 tablet 11    tacrolimus (PROGRAF) 1 MG Cap Take 1 capsule (1 mg total) by mouth every 12 (twelve) hours. Liver Transplant Z94.4 60 capsule 11     No current facility-administered medications for " this visit.        REVIEW OF SYSTEMS:    GENERAL:  No weight loss, malaise or fevers.  HEENT:  Negative for frequent or significant headaches.  NECK:  Negative for lumps, goiter, pain and significant neck swelling.  RESPIRATORY:  Negative for cough, wheezing or shortness of breath.  CARDIOVASCULAR:  Negative for chest pain, leg swelling or palpitations. H/O hypertension.  GI:  Negative for abdominal discomfort, blood in stools or black stools or change in bowel habits.  MUSCULOSKELETAL:  See HPI.  SKIN:  Negative for lesions, rash, and itching.  PSYCH:  Negative for sleep disturbance, mood disorder and recent psychosocial stressors.  HEMATOLOGY/LYMPHOLOGY:  Negative for prolonged bleeding, bruising easily or swollen nodes. H/O liver transplant.  NEURO:   No history of headaches, syncope, paralysis, seizures or tremors.  ENDO: Diabetes.   All other reviewed and negative other than HPI.    Past Medical History:  Past Medical History:   Diagnosis Date    Anemia     Anxiety     Chronic hepatitis C virus infection - RELAPSE following harvoni + RBV 8/23/2011    Completed 12 weeks Harvoni + RBV w/ RELAPSE 6 weeks out (3/2016)  Wk 1 Hgb 12.4, prograf 6.8 Wk 2 Hgb 12.7 Wk 4 Hgb 12.8, prograf 5.4; HCV RNA 47. INCREASE RBV to 600 Wk 5 Hgb 12.6 Wk 6 Hgb 12.4, HCV RNA <12, detected. INCREASE  Wk 7 Hgb 11.8 Wk 8 Hgb 11.7, prograf 4.0. INCREASE RBV 1000, INCREASE PROGRAF 2/1 Wk 9 Hgb 12.4, prograf 3.6  HCV RNA <12, detected. INCREASE PROGRAF 2/2 Wk 10 hgb     Chronic pain syndrome 7/13/2011    CKD (chronic kidney disease), stage III     Diabetes mellitus type II, uncontrolled     Discitis of lumbosacral region 1/16/2015    ED (erectile dysfunction)     Genital herpes     Gout, arthritis     History of alcohol abuse     History of positive PPD, treatment status unknown     Pulmonary granulomas, negative sputum cultures for AFB and indeterminate quantferon test    History of substance abuse     Hypertension      "Hypothyroidism     Peptic ulcer disease        Past Surgical History:  Past Surgical History:   Procedure Laterality Date    CHOLECYSTECTOMY      LIVER TRANSPLANT  06/2010    SPINE SURGERY         Family History:  Family History   Problem Relation Age of Onset    Cancer Mother     Diabetes Mother     Heart disease Mother     Diabetes Sister     Cancer Maternal Uncle 82     colon CA    Melanoma Neg Hx     Psoriasis Neg Hx     Lupus Neg Hx     Eczema Neg Hx     Acne Neg Hx        Social History:  Social History     Social History    Marital status:      Spouse name: N/A    Number of children: N/A    Years of education: N/A     Social History Main Topics    Smoking status: Former Smoker    Smokeless tobacco: Never Used    Alcohol use No      Comment: over 5 years ago, none currently    Drug use: No    Sexual activity: Not on file     Other Topics Concern    Not on file     Social History Narrative    No narrative on file       OBJECTIVE:    BP (!) 152/100   Pulse 101   Temp 98.7 °F (37.1 °C) (Oral)   Resp 18   Ht 6' 2" (1.88 m)   Wt 122 kg (268 lb 15.4 oz)   BMI 34.53 kg/m²     PHYSICAL EXAMINATION:    General appearance: Well appearing, in no acute distress, alert and oriented x3.  Psych:  Mood and affect appropriate.  Skin: Skin color, texture, turgor normal, no rashes or lesions, in both upper and lower body.  Head/face:  Atraumatic, normocephalic. No palpable lymph nodes.  GI: Abdomen soft and non-tender.  Back: Straight leg raising in the sitting and supine positions is negative to radicular pain. Limited lumbar flexion and extension with pain.  Positive facet loading bilaterally. There is pain with palpation over lumbar spine and bilateral SI joints.  Positive HUBER bilaterally.  Extremities: Peripheral joint ROM is full and pain free without obvious instability or laxity in all four extremities. No deformities, edema, or skin discoloration. Good capillary " refill.  Musculoskeletal:  Right plantar flexion 4/5.  All other LE strength 5/5 and symmetric.  No atrophy or tone abnormalities are noted.  Neuro: Bilateral upper and lower extremity coordination and muscle stretch reflexes are physiologic and symmetric.  Plantar response are downgoing.  Decreased sensation to anterior aspect of right foot.   Gait: Antalgic- ambulates without assistance.    ASSESSMENT: 61 y.o. year old male with lower back and right leg pain, consistent with the following diagnoses:     1. Postlaminectomy syndrome of lumbar region  oxycodone (ROXICODONE) 15 MG Tab    oxycodone (ROXICODONE) 15 MG Tab    oxycodone (ROXICODONE) 15 MG Tab   2. Lumbar radiculopathy  oxycodone (ROXICODONE) 15 MG Tab    oxycodone (ROXICODONE) 15 MG Tab    oxycodone (ROXICODONE) 15 MG Tab   3. Acquired spondylolisthesis  oxycodone (ROXICODONE) 15 MG Tab    oxycodone (ROXICODONE) 15 MG Tab    oxycodone (ROXICODONE) 15 MG Tab   4. Chronic pain syndrome  oxycodone (ROXICODONE) 15 MG Tab    oxycodone (ROXICODONE) 15 MG Tab    oxycodone (ROXICODONE) 15 MG Tab   5. Chronic, continuous use of opioids  oxycodone (ROXICODONE) 15 MG Tab    oxycodone (ROXICODONE) 15 MG Tab    oxycodone (ROXICODONE) 15 MG Tab    CANCELED: POCT Urine Drug Screen Pain Management    CANCELED: Pain Clinic Drug Screen   6. Encounter for monitoring opioid maintenance therapy  CANCELED: POCT Urine Drug Screen Pain Management    CANCELED: Pain Clinic Drug Screen         PLAN:     - Previous imaging was reviewed and discussed with the patient today. Recent labs reviewed with patient today.     - Patient can continue Roxicodone 15 mg QID PRN pain, #120. 3 months of prescriptions with appropriate dates was supplied today.     - The [place] Board of Pharmacy website for prescription monitoring was consulted today, and it does not suggest any deviations in conflict with the patient's controlled substance contract with our clinic. Will continue current therapy  with frequent monitoring of the controlled substance database, and urine drug screens on followup. Refill approved.  Medication management by Dr. Penn.    - Continue Gabapentin 600 mg 2 tablets BID.     - UDS from 5/31/2017 reviewed and consistent.    - RTC in 3 months or sooner if needed.    - Counseled patient regarding the importance of constant sleeping habits and physical therapy.  The patient will continue a home exercise routine to help with pain and strengthening.      - Dr. Penn was consulted on the patient and agrees with this plan.    The above plan and management options were discussed at length with patient. Patient is in agreement with the above and verbalized understanding.    Emma Batista NP  08/31/2017

## 2017-09-01 ENCOUNTER — EPISODE CHANGES (OUTPATIENT)
Dept: HEPATOLOGY | Facility: CLINIC | Age: 62
End: 2017-09-01

## 2017-09-01 ENCOUNTER — TELEPHONE (OUTPATIENT)
Dept: HEPATOLOGY | Facility: CLINIC | Age: 62
End: 2017-09-01

## 2017-09-01 DIAGNOSIS — B18.2 CHRONIC HEPATITIS C WITHOUT HEPATIC COMA: ICD-10-CM

## 2017-09-01 NOTE — TELEPHONE ENCOUNTER
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 Cr 1.3  Prograf 5.3  HCV negative    pls call pt:  Labs look good. Liver enzymes normal.prograf okay. HCV now negative in blood  - Continue Epclusa - 1 pill daily - don't miss any doses.  - INCREASE Ribavirin to 800mg daily - FOUR 200mg pills each day (can be all at once or  2 in AM and 2 in PM)  - continue current prograf dose    Next labs due - pls schedule  - CBC - wk 12 - 9/11

## 2017-09-01 NOTE — TELEPHONE ENCOUNTER
I spoke with patient's wife and message from PA Scheuermann relayed.  It was stressed that Ribavirin dose was increased to 800 mg daily.  Lab draw scheduled; reminder notice mailed.

## 2017-09-02 NOTE — NURSING
Called and spoke with patient's wife..  Arrival time 9am.   Pt verbalizes full understanding of all pre op instructions and denies questions.

## 2017-09-05 ENCOUNTER — TELEPHONE (OUTPATIENT)
Dept: PHARMACY | Facility: CLINIC | Age: 62
End: 2017-09-05

## 2017-09-06 ENCOUNTER — TELEPHONE (OUTPATIENT)
Dept: TRANSPLANT | Facility: CLINIC | Age: 62
End: 2017-09-06

## 2017-09-06 ENCOUNTER — SURGERY (OUTPATIENT)
Age: 62
End: 2017-09-06

## 2017-09-06 ENCOUNTER — HOSPITAL ENCOUNTER (OUTPATIENT)
Facility: HOSPITAL | Age: 62
Discharge: HOME | End: 2017-09-06
Attending: INTERNAL MEDICINE | Admitting: INTERNAL MEDICINE
Payer: MEDICARE

## 2017-09-06 VITALS
WEIGHT: 260 LBS | HEART RATE: 72 BPM | TEMPERATURE: 98 F | RESPIRATION RATE: 20 BRPM | HEIGHT: 73 IN | DIASTOLIC BLOOD PRESSURE: 87 MMHG | SYSTOLIC BLOOD PRESSURE: 164 MMHG | BODY MASS INDEX: 34.46 KG/M2 | OXYGEN SATURATION: 100 %

## 2017-09-06 DIAGNOSIS — G89.4 CHRONIC PAIN SYNDROME: Primary | ICD-10-CM

## 2017-09-06 DIAGNOSIS — I99.8 ISCHEMIC LEG: ICD-10-CM

## 2017-09-06 DIAGNOSIS — I73.9 PAD (PERIPHERAL ARTERY DISEASE): ICD-10-CM

## 2017-09-06 DIAGNOSIS — I10 ESSENTIAL HYPERTENSION: ICD-10-CM

## 2017-09-06 LAB
ANION GAP SERPL CALC-SCNC: 10 MMOL/L
BASOPHILS # BLD AUTO: 0.02 K/UL
BASOPHILS NFR BLD: 0.3 %
BUN SERPL-MCNC: 14 MG/DL
CALCIUM SERPL-MCNC: 9.2 MG/DL
CHLORIDE SERPL-SCNC: 104 MMOL/L
CO2 SERPL-SCNC: 23 MMOL/L
CREAT SERPL-MCNC: 1.3 MG/DL
DIFFERENTIAL METHOD: ABNORMAL
EOSINOPHIL # BLD AUTO: 0.3 K/UL
EOSINOPHIL NFR BLD: 5.2 %
ERYTHROCYTE [DISTWIDTH] IN BLOOD BY AUTOMATED COUNT: 13.8 %
EST. GFR  (AFRICAN AMERICAN): >60 ML/MIN/1.73 M^2
EST. GFR  (NON AFRICAN AMERICAN): 60 ML/MIN/1.73 M^2
GLUCOSE SERPL-MCNC: 191 MG/DL
HCT VFR BLD AUTO: 37.6 %
HGB BLD-MCNC: 12 G/DL
INR PPP: 1
LYMPHOCYTES # BLD AUTO: 2.2 K/UL
LYMPHOCYTES NFR BLD: 34 %
MCH RBC QN AUTO: 27.8 PG
MCHC RBC AUTO-ENTMCNC: 31.9 G/DL
MCV RBC AUTO: 87 FL
MONOCYTES # BLD AUTO: 0.7 K/UL
MONOCYTES NFR BLD: 10.2 %
NEUTROPHILS # BLD AUTO: 3.2 K/UL
NEUTROPHILS NFR BLD: 50.1 %
PERIPHERAL STENOSIS: ABNORMAL
PLATELET # BLD AUTO: 138 K/UL
PMV BLD AUTO: 11.9 FL
POTASSIUM SERPL-SCNC: 4.3 MMOL/L
PROTHROMBIN TIME: 11.1 SEC
RBC # BLD AUTO: 4.32 M/UL
SODIUM SERPL-SCNC: 137 MMOL/L
WBC # BLD AUTO: 6.35 K/UL

## 2017-09-06 PROCEDURE — 25000003 PHARM REV CODE 250: Performed by: INTERNAL MEDICINE

## 2017-09-06 PROCEDURE — 75716 ARTERY X-RAYS ARMS/LEGS: CPT | Mod: 26,,, | Performed by: INTERNAL MEDICINE

## 2017-09-06 PROCEDURE — 99152 MOD SED SAME PHYS/QHP 5/>YRS: CPT

## 2017-09-06 PROCEDURE — 75625 CONTRAST EXAM ABDOMINL AORTA: CPT | Mod: 26,,, | Performed by: INTERNAL MEDICINE

## 2017-09-06 PROCEDURE — 27200085 CATH LAB PROCEDURE

## 2017-09-06 PROCEDURE — 93010 ELECTROCARDIOGRAM REPORT: CPT | Mod: ,,, | Performed by: INTERNAL MEDICINE

## 2017-09-06 PROCEDURE — 80048 BASIC METABOLIC PNL TOTAL CA: CPT

## 2017-09-06 PROCEDURE — 25000003 PHARM REV CODE 250

## 2017-09-06 PROCEDURE — 36245 INS CATH ABD/L-EXT ART 1ST: CPT | Mod: ,,, | Performed by: INTERNAL MEDICINE

## 2017-09-06 PROCEDURE — 85025 COMPLETE CBC W/AUTO DIFF WBC: CPT

## 2017-09-06 PROCEDURE — 25500020 PHARM REV CODE 255

## 2017-09-06 PROCEDURE — 85610 PROTHROMBIN TIME: CPT

## 2017-09-06 PROCEDURE — 99152 MOD SED SAME PHYS/QHP 5/>YRS: CPT | Mod: ,,, | Performed by: INTERNAL MEDICINE

## 2017-09-06 PROCEDURE — 93005 ELECTROCARDIOGRAM TRACING: CPT

## 2017-09-06 PROCEDURE — 36415 COLL VENOUS BLD VENIPUNCTURE: CPT

## 2017-09-06 PROCEDURE — 63600175 PHARM REV CODE 636 W HCPCS

## 2017-09-06 PROCEDURE — 36140 INTRO NDL ICATH UPR/LXTR ART: CPT

## 2017-09-06 RX ORDER — ONDANSETRON 2 MG/ML
4 INJECTION INTRAMUSCULAR; INTRAVENOUS EVERY 12 HOURS PRN
Status: DISCONTINUED | OUTPATIENT
Start: 2017-09-06 | End: 2017-09-06 | Stop reason: HOSPADM

## 2017-09-06 RX ORDER — SODIUM CHLORIDE 9 MG/ML
3 INJECTION, SOLUTION INTRAVENOUS CONTINUOUS
Status: ACTIVE | OUTPATIENT
Start: 2017-09-06 | End: 2017-09-06

## 2017-09-06 RX ORDER — SODIUM CHLORIDE 9 MG/ML
INJECTION, SOLUTION INTRAVENOUS CONTINUOUS
Status: DISCONTINUED | OUTPATIENT
Start: 2017-09-06 | End: 2017-09-06

## 2017-09-06 RX ADMIN — SODIUM CHLORIDE 3 ML/KG/HR: 0.9 INJECTION, SOLUTION INTRAVENOUS at 11:09

## 2017-09-06 RX ADMIN — SODIUM CHLORIDE: 0.9 INJECTION, SOLUTION INTRAVENOUS at 10:09

## 2017-09-06 NOTE — DISCHARGE SUMMARY
[facility]  Discharge Summary      Admit Date: 9/6/2017    Discharge Date and Time:  09/06/2017 3:47 PM    Attending Physician: Margarito Mahajan MD     Reason for Admission: Aortogram with runoff    Procedures Performed: Procedure(s) (LRB):  ANGIOGRAM-EXTREMITY-LOWER (N/A)    Hospital Course (synopsis of major diagnoses, care, treatment, and services provided during the course of the hospital stay): The pt was brought to the cath lab and RATA  and diffusely diseased BRANDAN. May not be a candidate for optimal medical therapy given liver transplant and cirrhosis. Will plan for staged intervention of RATA and possibly BRANDAN after.       Final Diagnoses:    Principal Problem: PAD (peripheral artery disease)   Secondary Diagnoses:   Active Hospital Problems    Diagnosis  POA    *PAD (peripheral artery disease) [I73.9]  Yes    Ischemic leg [I99.8]  Yes    Essential hypertension [I10]  Yes      Resolved Hospital Problems    Diagnosis Date Resolved POA   No resolved problems to display.       Discharged Condition: good    Disposition: Home or Self Care    Follow Up/Patient Instructions:     Medications:  Reconciled Home Medications:   Current Discharge Medication List      CONTINUE these medications which have NOT CHANGED    Details   allopurinol (ZYLOPRIM) 100 MG tablet Take 1 tablet (100 mg total) by mouth 2 (two) times daily.  Qty: 60 tablet, Refills: 11      amitriptyline (ELAVIL) 25 MG tablet Take 1 tablet (25 mg total) by mouth every evening. For nerve pain and sleep  Qty: 30 tablet, Refills: 11      blood sugar diagnostic Strp 1 strip by Misc.(Non-Drug; Combo Route) route 4 (four) times daily before meals and nightly.  Qty: 100 strip, Refills: 11    Associated Diagnoses: Uncontrolled type 2 diabetes mellitus with stage 2 chronic kidney disease, without long-term current use of insulin      blood-glucose meter Misc 1 each by Misc.(Non-Drug; Combo Route) route 4 (four) times daily before meals  "and nightly.  Qty: 1 each, Refills: 0    Comments: Dispense with appropriate controls and strips      calcium carbonate-vitamin D3 (CALTRATE 600 + D) 600 mg(1,500mg) -400 unit Chew       EPCLUSA 400-100 mg Tab Take 1 tablet by mouth once daily.  Qty: 28 tablet, Refills: 5      famotidine (PEPCID) 40 MG tablet Take 1 tablet (40 mg total) by mouth once daily.  Qty: 30 tablet, Refills: 2      insulin aspart (NOVOLOG) 100 unit/mL InPn pen Inject 12 Units into the skin 3 (three) times daily with meals.  Qty: 10.8 mL, Refills: 11      insulin detemir (LEVEMIR FLEXTOUCH) 100 unit/mL (3 mL) SubQ InPn pen Inject 55 Units into the skin every evening.  Qty: 15 mL, Refills: 11      lancing device Misc 1 each by Misc.(Non-Drug; Combo Route) route 4 (four) times daily before meals and nightly.  Qty: 1 each, Refills: 0      levothyroxine (SYNTHROID) 88 MCG tablet TAKE 1 TABLET (88 MCG TOTAL) BY MOUTH ONCE DAILY.  Qty: 30 tablet, Refills: 6      lisinopril 10 MG tablet Take 1 tablet (10 mg total) by mouth once daily.  Qty: 90 tablet, Refills: 2      metoprolol tartrate (LOPRESSOR) 50 MG tablet TAKE 3 TABLETS BY MOUTH 2 (TWO) TIMES DAILY.  Qty: 180 tablet, Refills: 3      multivitamin (THERAGRAN) per tablet Take 1 tablet by mouth.      nifedipine (ADALAT CC) 60 MG TbSR Take 1 tablet (60 mg total) by mouth once daily.  Qty: 90 tablet, Refills: 11      pen needle, diabetic (BD ULTRA-FINE MENDY PEN NEEDLES) 32 gauge x 5/32" Ndle Use with aspart TIDWM and with detemir QHS  Qty: 100 each, Refills: 12      ribavirin (COPEGUS) 200 MG tablet Take as directed. Initial dose will be 3 tablets by PO once daily. Will increase as tolerated to 2 tablets PO BID  Qty: 112 tablet, Refills: 5      sertraline (ZOLOFT) 100 MG tablet Take 1 tablet (100 mg total) by mouth once daily.  Qty: 30 tablet, Refills: 11      tacrolimus (PROGRAF) 1 MG Cap Take 1 capsule (1 mg total) by mouth every 12 (twelve) hours. Liver Transplant Z94.4  Qty: 60 capsule, Refills: " 11    Associated Diagnoses: Liver replaced by transplant      lancets Misc 1 each by Misc.(Non-Drug; Combo Route) route 4 (four) times daily before meals and nightly.  Qty: 200 each, Refills: 11      oxycodone (ROXICODONE) 15 MG Tab Take 1 tablet (15 mg total) by mouth 4 (four) times daily as needed for Pain.  Qty: 120 tablet, Refills: 0    Associated Diagnoses: Postlaminectomy syndrome of lumbar region; Lumbar radiculopathy; Acquired spondylolisthesis; Chronic pain syndrome; Chronic, continuous use of opioids      sildenafil (VIAGRA) 100 MG tablet Take 1 tablet (100 mg total) by mouth daily as needed for Erectile Dysfunction.  Qty: 30 tablet, Refills: 11             Discharge Procedure Orders  Diet general   Order Specific Question Answer Comments   Additional restrictions: Cardiac (Low Na/Chol)    Additional restrictions: Diabetic 1800      Activity as tolerated     Lifting restrictions   Order Comments: No lifting over 10 pounds for 1 week     Call MD for:  severe uncontrolled pain     Call MD for:  redness, tenderness, or signs of infection (pain, swelling, redness, odor or green/yellow discharge around incision site)

## 2017-09-06 NOTE — NURSING
Pt discharged per MD order. IV taken out, tip intact. Vitals stable. Pt ambulated in unit with no problems and dressed self. Right radial site WNL, no swelling, bleeding, oozing, tenderness, or hematoma present. All discharge instructions given and pt verbalizes full understanding to all instructions and all questions answered. Pt was taken down to private car via wheelchair.

## 2017-09-06 NOTE — NURSING
Right radial vasc band removed per protocol. Gauze and tegaderm applied. Site WNL. No swelling, bleeding, oozing, tenderness, or hematoma present. Pt instructed about site and to keep wrist straight. Will continue to monitor until discharge.

## 2017-09-06 NOTE — TELEPHONE ENCOUNTER
Returned call, spoke with pt's wife. States pt had procedure on his legs today.  States vascular doctor will be calling us shortly regarding medications he needs to start.

## 2017-09-06 NOTE — TELEPHONE ENCOUNTER
----- Message from Brissa Gillespie sent at 9/6/2017 12:20 PM CDT -----  Contact: patient caregiver   Patient caregiver would like a call from Kandy she states its very important. Please call

## 2017-09-06 NOTE — PROGRESS NOTES
"Post Procedure Note: aortogram with runoff    The pt was brought to the cath lab and under sterile technique, right radial access was obtained without difficulty. Images were obtained in multiple views and prox RATA  and distal BRANDAN  noted. Please see full report for details. The pt tolerated the procedure well without complications. Radial band device used with successful hemostasis.     Vitals:    09/06/17 1008 09/06/17 1145   BP: 131/83 132/76   BP Location: Right arm Left arm   Patient Position:  Lying   Pulse: 71 72   Resp: 15 14   Temp: 98.2 °F (36.8 °C)    TempSrc: Oral    SpO2: 100% 100%   Weight: 117.9 kg (260 lb)    Height: 6' 1" (1.854 m)          Gen: NAD  Ext: 2+ radial pulse, no evidence of hematoma    Plan:  -Post cath care per protocol  -Will stage intervention of RATA first  -Will need to see if pt can be started on ASA and statin  "

## 2017-09-07 RX ORDER — LISINOPRIL 10 MG/1
10 TABLET ORAL DAILY
Qty: 90 TABLET | Refills: 2 | Status: SHIPPED | OUTPATIENT
Start: 2017-09-07 | End: 2018-03-27 | Stop reason: SDUPTHER

## 2017-09-11 ENCOUNTER — TELEPHONE (OUTPATIENT)
Dept: HEPATOLOGY | Facility: CLINIC | Age: 62
End: 2017-09-11

## 2017-09-11 ENCOUNTER — LAB VISIT (OUTPATIENT)
Dept: LAB | Facility: HOSPITAL | Age: 62
End: 2017-09-11
Attending: PHYSICIAN ASSISTANT
Payer: MEDICARE

## 2017-09-11 DIAGNOSIS — B18.2 CHRONIC HEPATITIS C WITHOUT HEPATIC COMA: ICD-10-CM

## 2017-09-11 DIAGNOSIS — Z94.4 HISTORY OF LIVER TRANSPLANT: Primary | ICD-10-CM

## 2017-09-11 DIAGNOSIS — Z79.60 LONG-TERM USE OF IMMUNOSUPPRESSANT MEDICATION: ICD-10-CM

## 2017-09-11 LAB
BASOPHILS # BLD AUTO: 0.03 K/UL
BASOPHILS NFR BLD: 0.3 %
DIFFERENTIAL METHOD: ABNORMAL
EOSINOPHIL # BLD AUTO: 0.5 K/UL
EOSINOPHIL NFR BLD: 5.5 %
ERYTHROCYTE [DISTWIDTH] IN BLOOD BY AUTOMATED COUNT: 14.4 %
HCT VFR BLD AUTO: 38.9 %
HGB BLD-MCNC: 12.5 G/DL
LYMPHOCYTES # BLD AUTO: 3.3 K/UL
LYMPHOCYTES NFR BLD: 37.3 %
MCH RBC QN AUTO: 28.3 PG
MCHC RBC AUTO-ENTMCNC: 32.1 G/DL
MCV RBC AUTO: 88 FL
MONOCYTES # BLD AUTO: 0.7 K/UL
MONOCYTES NFR BLD: 8.4 %
NEUTROPHILS # BLD AUTO: 4.2 K/UL
NEUTROPHILS NFR BLD: 48.3 %
PLATELET # BLD AUTO: 166 K/UL
PMV BLD AUTO: 12.8 FL
RBC # BLD AUTO: 4.42 M/UL
WBC # BLD AUTO: 8.73 K/UL

## 2017-09-11 PROCEDURE — 85025 COMPLETE CBC W/AUTO DIFF WBC: CPT

## 2017-09-11 PROCEDURE — 36415 COLL VENOUS BLD VENIPUNCTURE: CPT | Mod: PO

## 2017-09-12 ENCOUNTER — EPISODE CHANGES (OUTPATIENT)
Dept: HEPATOLOGY | Facility: CLINIC | Age: 62
End: 2017-09-12

## 2017-09-12 NOTE — TELEPHONE ENCOUNTER
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 Anemia level is stable  - Continue Epclusa - 1 pill daily - don't miss any doses.  - CONTINUE Ribavirin 800mg daily - FOUR 200mg pills each day (can be all at once or  2 in AM and 2 in PM)  - continue current prograf dose    Next labs due - pls schedule  - CBC,CMP, prograf - wk 14 - 9/25

## 2017-09-15 ENCOUNTER — TELEPHONE (OUTPATIENT)
Dept: CARDIOLOGY | Facility: CLINIC | Age: 62
End: 2017-09-15

## 2017-09-15 NOTE — TELEPHONE ENCOUNTER
----- Message from Cecilia Vega sent at 9/15/2017  2:11 PM CDT -----  Contact: [phone]/ Cely Gonzalez   Patient requesting to speak with you regarding scheduling his procedure. Please advise.

## 2017-09-18 ENCOUNTER — TELEPHONE (OUTPATIENT)
Dept: CARDIOLOGY | Facility: CLINIC | Age: 62
End: 2017-09-18

## 2017-09-18 DIAGNOSIS — I73.9 PAD (PERIPHERAL ARTERY DISEASE): Primary | ICD-10-CM

## 2017-09-18 DIAGNOSIS — I73.9 CLAUDICATION: ICD-10-CM

## 2017-09-18 RX ORDER — DIPHENHYDRAMINE HCL 25 MG
50 CAPSULE ORAL ONCE
Status: CANCELLED | OUTPATIENT
Start: 2017-09-18 | End: 2017-09-18

## 2017-09-18 RX ORDER — SODIUM CHLORIDE 9 MG/ML
3 INJECTION, SOLUTION INTRAVENOUS CONTINUOUS
Status: CANCELLED | OUTPATIENT
Start: 2017-09-18 | End: 2017-09-18

## 2017-09-18 NOTE — TELEPHONE ENCOUNTER
----- Message from Jasmyne Schneider sent at 9/18/2017  2:26 PM CDT -----  Patient's wife, Cely Gonzalez, called.    No. [phone]   Patient is scheduled for a 4 week follow up on 9/27/17.   Please call with the reason for the follow up.

## 2017-09-18 NOTE — TELEPHONE ENCOUNTER
Patient calling in to request a date for a procedure. Patient is complaining of pain in his chest.

## 2017-09-20 NOTE — NURSING
Tried to call patient with preop instructions.  Pt's voicemail was full.  Will try to call tomorrow.

## 2017-09-22 NOTE — NURSING
Called and spoke with pt wife, Cely. Arrival time 6am. Verbalizes full understanding to all pre-op instructions including half dose of levemir the night before and no insulin the morning of procedure. All questions answered.

## 2017-09-25 ENCOUNTER — EPISODE CHANGES (OUTPATIENT)
Dept: HEPATOLOGY | Facility: CLINIC | Age: 62
End: 2017-09-25

## 2017-09-25 ENCOUNTER — LAB VISIT (OUTPATIENT)
Dept: LAB | Facility: HOSPITAL | Age: 62
End: 2017-09-25
Attending: PHYSICIAN ASSISTANT
Payer: MEDICARE

## 2017-09-25 DIAGNOSIS — Z79.60 LONG-TERM USE OF IMMUNOSUPPRESSANT MEDICATION: ICD-10-CM

## 2017-09-25 DIAGNOSIS — Z94.4 HISTORY OF LIVER TRANSPLANT: ICD-10-CM

## 2017-09-25 DIAGNOSIS — B18.2 CHRONIC HEPATITIS C WITHOUT HEPATIC COMA: ICD-10-CM

## 2017-09-25 LAB
ALBUMIN SERPL BCP-MCNC: 3.1 G/DL
ALP SERPL-CCNC: 76 U/L
ALT SERPL W/O P-5'-P-CCNC: 32 U/L
ANION GAP SERPL CALC-SCNC: 8 MMOL/L
AST SERPL-CCNC: 32 U/L
BASOPHILS # BLD AUTO: 0.02 K/UL
BASOPHILS NFR BLD: 0.3 %
BILIRUB SERPL-MCNC: 0.5 MG/DL
BUN SERPL-MCNC: 13 MG/DL
CALCIUM SERPL-MCNC: 8.5 MG/DL
CHLORIDE SERPL-SCNC: 109 MMOL/L
CO2 SERPL-SCNC: 23 MMOL/L
CREAT SERPL-MCNC: 1.2 MG/DL
DIFFERENTIAL METHOD: ABNORMAL
EOSINOPHIL # BLD AUTO: 0.6 K/UL
EOSINOPHIL NFR BLD: 7.2 %
ERYTHROCYTE [DISTWIDTH] IN BLOOD BY AUTOMATED COUNT: 14.1 %
EST. GFR  (AFRICAN AMERICAN): >60 ML/MIN/1.73 M^2
EST. GFR  (NON AFRICAN AMERICAN): >60 ML/MIN/1.73 M^2
GLUCOSE SERPL-MCNC: 110 MG/DL
HCT VFR BLD AUTO: 36 %
HGB BLD-MCNC: 11.8 G/DL
LYMPHOCYTES # BLD AUTO: 3.2 K/UL
LYMPHOCYTES NFR BLD: 42.4 %
MCH RBC QN AUTO: 28.3 PG
MCHC RBC AUTO-ENTMCNC: 32.8 G/DL
MCV RBC AUTO: 86 FL
MONOCYTES # BLD AUTO: 0.6 K/UL
MONOCYTES NFR BLD: 7.2 %
NEUTROPHILS # BLD AUTO: 3.3 K/UL
NEUTROPHILS NFR BLD: 42.9 %
PLATELET # BLD AUTO: 160 K/UL
PMV BLD AUTO: 13.3 FL
POTASSIUM SERPL-SCNC: 4 MMOL/L
PROT SERPL-MCNC: 6.9 G/DL
RBC # BLD AUTO: 4.17 M/UL
SODIUM SERPL-SCNC: 140 MMOL/L
WBC # BLD AUTO: 7.6 K/UL

## 2017-09-25 PROCEDURE — 36415 COLL VENOUS BLD VENIPUNCTURE: CPT | Mod: PO

## 2017-09-25 PROCEDURE — 85025 COMPLETE CBC W/AUTO DIFF WBC: CPT

## 2017-09-25 PROCEDURE — 80053 COMPREHEN METABOLIC PANEL: CPT

## 2017-09-25 PROCEDURE — 80197 ASSAY OF TACROLIMUS: CPT

## 2017-09-26 ENCOUNTER — EPISODE CHANGES (OUTPATIENT)
Dept: HEPATOLOGY | Facility: CLINIC | Age: 62
End: 2017-09-26

## 2017-09-26 ENCOUNTER — TELEPHONE (OUTPATIENT)
Dept: CARDIOLOGY | Facility: CLINIC | Age: 62
End: 2017-09-26

## 2017-09-26 ENCOUNTER — TELEPHONE (OUTPATIENT)
Dept: HEPATOLOGY | Facility: CLINIC | Age: 62
End: 2017-09-26

## 2017-09-26 DIAGNOSIS — Z79.60 LONG-TERM USE OF IMMUNOSUPPRESSANT MEDICATION: ICD-10-CM

## 2017-09-26 DIAGNOSIS — Z94.4 HISTORY OF LIVER TRANSPLANT: ICD-10-CM

## 2017-09-26 DIAGNOSIS — Z94.4 LIVER REPLACED BY TRANSPLANT: ICD-10-CM

## 2017-09-26 DIAGNOSIS — B18.2 CHRONIC HEPATITIS C WITHOUT HEPATIC COMA: Primary | ICD-10-CM

## 2017-09-26 LAB — TACROLIMUS BLD-MCNC: 3 NG/ML

## 2017-09-26 RX ORDER — TACROLIMUS 1 MG/1
CAPSULE ORAL
Qty: 90 CAPSULE | Refills: 11 | Status: SHIPPED | OUTPATIENT
Start: 2017-09-26 | End: 2017-09-28 | Stop reason: SDUPTHER

## 2017-09-27 ENCOUNTER — HOSPITAL ENCOUNTER (OUTPATIENT)
Facility: HOSPITAL | Age: 62
Discharge: HOME | End: 2017-09-27
Attending: INTERNAL MEDICINE | Admitting: INTERNAL MEDICINE
Payer: MEDICARE

## 2017-09-27 VITALS
OXYGEN SATURATION: 100 % | RESPIRATION RATE: 16 BRPM | HEART RATE: 75 BPM | BODY MASS INDEX: 35.12 KG/M2 | DIASTOLIC BLOOD PRESSURE: 95 MMHG | SYSTOLIC BLOOD PRESSURE: 134 MMHG | HEIGHT: 73 IN | WEIGHT: 265 LBS | TEMPERATURE: 98 F

## 2017-09-27 DIAGNOSIS — I99.8 ISCHEMIC LEG: ICD-10-CM

## 2017-09-27 DIAGNOSIS — I73.9 PAD (PERIPHERAL ARTERY DISEASE): Primary | ICD-10-CM

## 2017-09-27 DIAGNOSIS — G89.4 CHRONIC PAIN SYNDROME: ICD-10-CM

## 2017-09-27 DIAGNOSIS — I73.9 CLAUDICATION: ICD-10-CM

## 2017-09-27 LAB
ANION GAP SERPL CALC-SCNC: 11 MMOL/L
BASOPHILS # BLD AUTO: 0.01 K/UL
BASOPHILS NFR BLD: 0.1 %
BUN SERPL-MCNC: 16 MG/DL
CALCIUM SERPL-MCNC: 9.2 MG/DL
CHLORIDE SERPL-SCNC: 108 MMOL/L
CO2 SERPL-SCNC: 22 MMOL/L
CREAT SERPL-MCNC: 1.2 MG/DL
DIFFERENTIAL METHOD: ABNORMAL
EOSINOPHIL # BLD AUTO: 0.6 K/UL
EOSINOPHIL NFR BLD: 6.1 %
ERYTHROCYTE [DISTWIDTH] IN BLOOD BY AUTOMATED COUNT: 14 %
EST. GFR  (AFRICAN AMERICAN): >60 ML/MIN/1.73 M^2
EST. GFR  (NON AFRICAN AMERICAN): >60 ML/MIN/1.73 M^2
GLUCOSE SERPL-MCNC: 118 MG/DL
HCT VFR BLD AUTO: 37.6 %
HGB BLD-MCNC: 12.1 G/DL
INR PPP: 1
LYMPHOCYTES # BLD AUTO: 3.1 K/UL
LYMPHOCYTES NFR BLD: 29.6 %
MCH RBC QN AUTO: 28.3 PG
MCHC RBC AUTO-ENTMCNC: 32.2 G/DL
MCV RBC AUTO: 88 FL
MONOCYTES # BLD AUTO: 1 K/UL
MONOCYTES NFR BLD: 9.5 %
NEUTROPHILS # BLD AUTO: 5.6 K/UL
NEUTROPHILS NFR BLD: 54.5 %
PERIPHERAL STENOSIS: ABNORMAL
PLATELET # BLD AUTO: 172 K/UL
PMV BLD AUTO: 12.3 FL
POTASSIUM SERPL-SCNC: 3.9 MMOL/L
PROTHROMBIN TIME: 10.9 SEC
RBC # BLD AUTO: 4.27 M/UL
SODIUM SERPL-SCNC: 141 MMOL/L
WBC # BLD AUTO: 10.29 K/UL

## 2017-09-27 PROCEDURE — 25000003 PHARM REV CODE 250: Performed by: INTERNAL MEDICINE

## 2017-09-27 PROCEDURE — 99152 MOD SED SAME PHYS/QHP 5/>YRS: CPT | Mod: ,,, | Performed by: INTERNAL MEDICINE

## 2017-09-27 PROCEDURE — 63600175 PHARM REV CODE 636 W HCPCS

## 2017-09-27 PROCEDURE — C1769 GUIDE WIRE: HCPCS

## 2017-09-27 PROCEDURE — 37252 INTRVASC US NONCORONARY 1ST: CPT

## 2017-09-27 PROCEDURE — 37228 CATH LAB PROCEDURE: CPT | Mod: RT,,, | Performed by: INTERNAL MEDICINE

## 2017-09-27 PROCEDURE — 25000003 PHARM REV CODE 250

## 2017-09-27 PROCEDURE — 25500020 PHARM REV CODE 255

## 2017-09-27 PROCEDURE — 37252 INTRVASC US NONCORONARY 1ST: CPT | Mod: ,,, | Performed by: INTERNAL MEDICINE

## 2017-09-27 PROCEDURE — 85610 PROTHROMBIN TIME: CPT

## 2017-09-27 PROCEDURE — 85025 COMPLETE CBC W/AUTO DIFF WBC: CPT

## 2017-09-27 PROCEDURE — 80048 BASIC METABOLIC PNL TOTAL CA: CPT

## 2017-09-27 RX ORDER — OXYCODONE HYDROCHLORIDE 5 MG/1
5 TABLET ORAL ONCE
Status: COMPLETED | OUTPATIENT
Start: 2017-09-27 | End: 2017-09-27

## 2017-09-27 RX ORDER — SODIUM CHLORIDE 9 MG/ML
3 INJECTION, SOLUTION INTRAVENOUS CONTINUOUS
Status: ACTIVE | OUTPATIENT
Start: 2017-09-27 | End: 2017-09-27

## 2017-09-27 RX ORDER — ACETAMINOPHEN 325 MG/1
650 TABLET ORAL EVERY 4 HOURS PRN
Status: DISCONTINUED | OUTPATIENT
Start: 2017-09-27 | End: 2017-09-27 | Stop reason: HOSPADM

## 2017-09-27 RX ORDER — ONDANSETRON 2 MG/ML
4 INJECTION INTRAMUSCULAR; INTRAVENOUS EVERY 12 HOURS PRN
Status: DISCONTINUED | OUTPATIENT
Start: 2017-09-27 | End: 2017-09-27 | Stop reason: HOSPADM

## 2017-09-27 RX ORDER — DIPHENHYDRAMINE HCL 50 MG
50 CAPSULE ORAL ONCE
Status: DISCONTINUED | OUTPATIENT
Start: 2017-09-27 | End: 2017-09-27 | Stop reason: HOSPADM

## 2017-09-27 RX ADMIN — SODIUM CHLORIDE 3 ML/KG/HR: 0.9 INJECTION, SOLUTION INTRAVENOUS at 10:09

## 2017-09-27 RX ADMIN — SODIUM CHLORIDE 3 ML/KG/HR: 0.9 INJECTION, SOLUTION INTRAVENOUS at 07:09

## 2017-09-27 RX ADMIN — OXYCODONE HYDROCHLORIDE 5 MG: 5 TABLET ORAL at 12:09

## 2017-09-27 NOTE — DISCHARGE INSTRUCTIONS
No driving the day of procedure  Remove dressing tomorrow after shower.  May apply bandaid for 2 days    No strenuous activity or exercise for 3 days following procedure  No tub bath, Do not submerge wound in water for 3 days  Do not life anything over 5lbs for 3 days after procedure  If site swells or bleeds, hold direct pressure to area for 10 full minutes.  (if site continues to bleed, have someone bring you to the ER)

## 2017-09-27 NOTE — NURSING
Patient arrived to recovery cath lab.  Pt drowsy but able to follow commands.  VSS.  Left groin site is CDI, site soft, no bleeding or hematoma noted.  Bilateral pedal pulses dopplered. Fall risk precautions given and patients acknowledges.  Will continue to monitor

## 2017-09-27 NOTE — NURSING
Pt walked around unit per MD order.  Left groin sight assessed no hematoma or bleeding noted.  Gauze dressing CDI.  Pt d/c per MD order.  PIV taken out.  Tip in tact.  VSS. Discharge instructions given to patient.  Verbalized full instructions. All questions answered.    Pt d/c home via wheelchair to private vehicle.

## 2017-09-27 NOTE — PROGRESS NOTES
Post Procedure Note: Peripheral PTA     The pt was brought to the cath lab and under sterile technique, LCFA access was obtained without difficulty. Images were obtained in multiple views and PTA performed to proximal and ostial peroneal for moderate to severe stenosis and AVF not see on previous angiogram. Please see full report for details. The pt tolerated the procedure well without complications. Perclose closure device used with successful hemostasis.     Vitals:    09/27/17 1215 09/27/17 1245 09/27/17 1345 09/27/17 1445   BP: (!) 142/92 (!) 146/86 138/82 (!) 134/95   BP Location: Right arm Right arm Right arm Right arm   Patient Position: Lying Lying Lying Lying   Pulse: 65 65 75 75   Resp: 12 10 20 16   Temp:       TempSrc:       SpO2: 100% 100% 100% 100%   Weight:       Height:             Gen: NAD  Ext: 2+ fem/DP/PT pulses, no evidence of hematoma    Plan:  -Post cath care per protocol  -Will consider PTA or LLE

## 2017-09-27 NOTE — DISCHARGE SUMMARY
[facility]  Discharge Summary      Admit Date: 9/27/2017    Discharge Date and Time:  09/27/2017 4:39 PM    Attending Physician: Margarito Mahajan MD    Reason for Admission: Peripheral intervention     Procedures Performed: Procedure(s) (LRB):  PTA-PERIPHERAL (N/A)    Hospital Course (synopsis of major diagnoses, care, treatment, and services provided during the course of the hospital stay: The pt was brought to the cath lab and PTA performed to peroneal for severe stenosis and AVF and lifestyle limiting claudication.        Final Diagnoses:    Principal Problem: PAD   Secondary Diagnoses:   Active Hospital Problems    Diagnosis  POA    PAD (peripheral artery disease) [I73.9]  Yes     Priority: Low      Resolved Hospital Problems    Diagnosis Date Resolved POA   No resolved problems to display.   Claudication  Hep C  CKD  HTN  HLD  DM2  Liver transplant    Discharged Condition: good    Disposition: Home or Self Care    Follow Up/Patient Instructions:     Medications:  Reconciled Home Medications:   Discharge Medication List as of 9/27/2017 12:43 PM      CONTINUE these medications which have NOT CHANGED    Details   allopurinol (ZYLOPRIM) 100 MG tablet Take 1 tablet (100 mg total) by mouth 2 (two) times daily., Starting 7/19/2016, Until Discontinued, Normal      amitriptyline (ELAVIL) 25 MG tablet Take 1 tablet (25 mg total) by mouth every evening. For nerve pain and sleep, Starting 8/31/2016, Until Discontinued, Normal      blood sugar diagnostic Strp 1 strip by Misc.(Non-Drug; Combo Route) route 4 (four) times daily before meals and nightly., Starting 1/25/2017, Until Discontinued, Print      blood-glucose meter Misc 1 each by Misc.(Non-Drug; Combo Route) route 4 (four) times daily before meals and nightly., Starting 10/7/2016, Until Sat 10/7/17, Normal      calcium carbonate-vitamin D3 (CALTRATE 600 + D) 600 mg(1,500mg) -400 unit Chew Starting 6/27/2012, Until Discontinued, Historical Med     "  EPCLUSA 400-100 mg Tab Take 1 tablet by mouth once daily., Starting Thu 6/8/2017, Normal      famotidine (PEPCID) 40 MG tablet Take 1 tablet (40 mg total) by mouth once daily., Starting 5/12/2017, Until Sat 5/12/18, Normal      insulin aspart (NOVOLOG) 100 unit/mL InPn pen Inject 12 Units into the skin 3 (three) times daily with meals., Starting 3/24/2017, Until Sat 3/24/18, Normal      insulin detemir (LEVEMIR FLEXTOUCH) 100 unit/mL (3 mL) SubQ InPn pen Inject 55 Units into the skin every evening., Starting 2/23/2017, Until Discontinued, Normal      lancets Misc 1 each by Misc.(Non-Drug; Combo Route) route 4 (four) times daily before meals and nightly., Starting 11/8/2016, Until Discontinued, Normal      lancing device Misc 1 each by Misc.(Non-Drug; Combo Route) route 4 (four) times daily before meals and nightly., Starting 10/7/2016, Until Sat 10/7/17, Normal      levothyroxine (SYNTHROID) 88 MCG tablet TAKE 1 TABLET (88 MCG TOTAL) BY MOUTH ONCE DAILY., Normal      lisinopril 10 MG tablet TAKE 1 TABLET (10 MG TOTAL) BY MOUTH ONCE DAILY., Starting u 9/7/2017, Normal      metoprolol tartrate (LOPRESSOR) 50 MG tablet TAKE 3 TABLETS BY MOUTH 2 (TWO) TIMES DAILY., Normal      multivitamin (THERAGRAN) per tablet Take 1 tablet by mouth., Starting 6/27/2012, Until Discontinued, Historical Med      nifedipine (ADALAT CC) 60 MG TbSR Take 1 tablet (60 mg total) by mouth once daily., Starting 2/23/2017, Until Discontinued, Normal      oxycodone (ROXICODONE) 15 MG Tab Take 1 tablet (15 mg total) by mouth 4 (four) times daily as needed for Pain., Starting Fri 9/29/2017, Until Sun 10/29/2017, Print      pen needle, diabetic (BD ULTRA-FINE MENDY PEN NEEDLES) 32 gauge x 5/32" Ndle Use with aspart TIDWM and with detemir QHS, Normal      ribavirin (COPEGUS) 200 MG tablet Take as directed. Initial dose will be 3 tablets by PO once daily. Will increase as tolerated to 2 tablets PO BID, Normal      sertraline (ZOLOFT) 100 MG tablet " Take 1 tablet (100 mg total) by mouth once daily., Starting 8/2/2016, Until Discontinued, Normal      sildenafil (VIAGRA) 100 MG tablet Take 1 tablet (100 mg total) by mouth daily as needed for Erectile Dysfunction., Starting Tue 1/19/2016, Until Wed 8/30/2017, Normal      tacrolimus (PROGRAF) 1 MG Cap Take 2mg in AM and 1mg in PM, PO. Liver Transplant Z94.4, Normal             Discharge Procedure Orders  Diet general   Order Specific Question Answer Comments   Additional restrictions: Cardiac (Low Na/Chol)      Activity as tolerated

## 2017-09-27 NOTE — PLAN OF CARE
Pt laying in bed with no complaints. Monitors applied, VSS. Yellow socks on and fall risk reviewed with pt, verbalizes understanding. Procedure and recovery explained to pt and he verbalizes understanding, all questions answered. Will continue to monitor.

## 2017-09-28 ENCOUNTER — TELEPHONE (OUTPATIENT)
Dept: PHARMACY | Facility: CLINIC | Age: 62
End: 2017-09-28

## 2017-09-28 ENCOUNTER — TELEPHONE (OUTPATIENT)
Dept: FAMILY MEDICINE | Facility: CLINIC | Age: 62
End: 2017-09-28

## 2017-09-28 DIAGNOSIS — Z94.4 LIVER REPLACED BY TRANSPLANT: ICD-10-CM

## 2017-09-28 RX ORDER — TACROLIMUS 1 MG/1
CAPSULE ORAL
Qty: 90 CAPSULE | Refills: 11 | Status: SHIPPED | OUTPATIENT
Start: 2017-09-28 | End: 2018-10-05 | Stop reason: SDUPTHER

## 2017-09-28 NOTE — TELEPHONE ENCOUNTER
----- Message from Richelle Gary sent at 9/28/2017  9:55 AM CDT -----  Contact: wife/Cely  [phone]  Pt wife would like to speak with you concerning a prescription for diabetic shoes and his upcoming appointments.  Please call and advise

## 2017-09-28 NOTE — TELEPHONE ENCOUNTER
Spoke with patients wife she stated that the forms were faxed 2 weeks ago. She spoke with the diabetic shoe company and they are sending the again.

## 2017-09-29 ENCOUNTER — HOSPITAL ENCOUNTER (EMERGENCY)
Facility: HOSPITAL | Age: 62
Discharge: HOME | End: 2017-09-29
Attending: EMERGENCY MEDICINE
Payer: MEDICARE

## 2017-09-29 VITALS
HEIGHT: 73 IN | SYSTOLIC BLOOD PRESSURE: 140 MMHG | OXYGEN SATURATION: 98 % | BODY MASS INDEX: 34.46 KG/M2 | TEMPERATURE: 98 F | RESPIRATION RATE: 16 BRPM | WEIGHT: 260 LBS | DIASTOLIC BLOOD PRESSURE: 83 MMHG | HEART RATE: 81 BPM

## 2017-09-29 DIAGNOSIS — S82.892A CLOSED LEFT ANKLE FRACTURE, INITIAL ENCOUNTER: Primary | ICD-10-CM
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
## 2017-09-29 LAB — POCT GLUCOSE: 90 MG/DL (ref 70–110)

## 2017-09-29 PROCEDURE — 82962 GLUCOSE BLOOD TEST: CPT

## 2017-09-29 PROCEDURE — 29515 APPLICATION SHORT LEG SPLINT: CPT

## 2017-09-29 PROCEDURE — 99284 EMERGENCY DEPT VISIT MOD MDM: CPT | Mod: 25

## 2017-09-29 RX ORDER — GABAPENTIN 600 MG/1
600 TABLET ORAL 4 TIMES DAILY
Refills: 2 | COMMUNITY
Start: 2017-08-29 | End: 2017-11-30 | Stop reason: ALTCHOICE

## 2017-09-30 NOTE — ED NOTES
Pt c/o L ankle pain after twisting ankle approx 2 days ago.  Pt also has chronic vascular disease to LLE.

## 2017-09-30 NOTE — ED PROVIDER NOTES
Encounter Date: 9/29/2017       History     Chief Complaint   Patient presents with    Ankle Pain     reports pain and swelling to left ankle after falling and teisting ankle two days ago     60yo male with hepatitis C, diabetes, status post liver transplant, and hypertension is here for left ankle pain.  The patient reports that 2 days ago, he was walking down his steps at home in the dark, when he twisted his left ankle.  He reports immediate onset of left ankle pain that has not changed.  He wanted to give it time to see if the pain would improve but it has not.  He has been using his home medication to help with this pain.  His last dose was immediately prior to arrival.  Palpation and movement makes the pain worse.  Nothing seems to help.  He denies history of previous ankle injuries.  He does report a history of PVD.      The history is provided by the patient.   Leg Pain    The incident occurred at home. The injury mechanism was torsion. The incident occurred two days ago. The pain is present in the left foot and left ankle. The quality of the pain is described as aching. The pain is at a severity of 9/10. The pain has been constant since onset. Pertinent negatives include no numbness, no inability to bear weight, no loss of motion, no muscle weakness, no loss of sensation and no tingling. The symptoms are aggravated by activity, bearing weight and palpation. Treatments tried: Chronic pain med. The treatment provided no relief.     Review of patient's allergies indicates:  No Known Allergies  Past Medical History:   Diagnosis Date    Anemia     Anxiety     Chronic hepatitis C virus infection - RELAPSE following harvoni + RBV 8/23/2011    Completed 12 weeks Harvoni + RBV w/ RELAPSE 6 weeks out (3/2016)  Wk 1 Hgb 12.4, prograf 6.8 Wk 2 Hgb 12.7 Wk 4 Hgb 12.8, prograf 5.4; HCV RNA 47. INCREASE RBV to 600 Wk 5 Hgb 12.6 Wk 6 Hgb 12.4, HCV RNA <12, detected. INCREASE  Wk 7 Hgb 11.8 Wk 8 Hgb 11.7, prograf  4.0. INCREASE RBV 1000, INCREASE PROGRAF 2/1 Wk 9 Hgb 12.4, prograf 3.6  HCV RNA <12, detected. INCREASE PROGRAF 2/2 Wk 10 hgb     Chronic pain syndrome 7/13/2011    CKD (chronic kidney disease), stage III     Diabetes mellitus type II, uncontrolled     Discitis of lumbosacral region 1/16/2015    ED (erectile dysfunction)     Genital herpes     Gout, arthritis     History of alcohol abuse     History of positive PPD, treatment status unknown     Pulmonary granulomas, negative sputum cultures for AFB and indeterminate quantferon test    History of substance abuse     Hypertension     Hypothyroidism     Peptic ulcer disease      Past Surgical History:   Procedure Laterality Date    CHOLECYSTECTOMY      LIVER TRANSPLANT  06/2010    SPINE SURGERY       Family History   Problem Relation Age of Onset    Cancer Mother     Diabetes Mother     Heart disease Mother     Diabetes Sister     Cancer Maternal Uncle 82     colon CA    Melanoma Neg Hx     Psoriasis Neg Hx     Lupus Neg Hx     Eczema Neg Hx     Acne Neg Hx      Social History   Substance Use Topics    Smoking status: Former Smoker    Smokeless tobacco: Never Used    Alcohol use No      Comment: over 5 years ago, none currently     Review of Systems   Constitutional: Negative for chills and fever.   Respiratory: Negative for cough and shortness of breath.    Cardiovascular: Negative for chest pain.   Gastrointestinal: Negative for vomiting.   Musculoskeletal: Positive for arthralgias, gait problem and joint swelling. Negative for neck pain and neck stiffness.   Skin: Negative for rash and wound.   Neurological: Negative for tingling, weakness and numbness.   Hematological: Does not bruise/bleed easily.   Psychiatric/Behavioral: Negative for confusion.       Physical Exam     Initial Vitals [09/29/17 2004]   BP Pulse Resp Temp SpO2   (!) 145/78 88 (!) 98 98.4 °F (36.9 °C) 99 %      MAP       100.33         Physical Exam    Nursing note and  vitals reviewed.  Constitutional: Vital signs are normal. He appears well-developed and well-nourished. He is active and cooperative. He is easily aroused.  Non-toxic appearance. He does not have a sickly appearance. He does not appear ill. No distress.   HENT:   Head: Normocephalic and atraumatic.   Eyes: Conjunctivae are normal.   Neck: Normal range of motion.   Cardiovascular: Normal rate.   Pulmonary/Chest: Effort normal.   Abdominal: Soft. Normal appearance.   Musculoskeletal:        Left knee: Normal.        Left ankle: He exhibits decreased range of motion and swelling. He exhibits no ecchymosis, no deformity, no laceration and normal pulse. Tenderness. Lateral malleolus, AITFL and head of 5th metatarsal tenderness found. No medial malleolus, no CF ligament and no proximal fibula tenderness found. Achilles tendon normal.        Left upper leg: Normal.        Left lower leg: Normal.        Left foot: There is tenderness, bony tenderness and swelling. There is normal range of motion, normal capillary refill, no crepitus, no deformity and no laceration.   No calf tenderness.    Neurological: He is alert, oriented to person, place, and time and easily aroused. He has normal strength. No sensory deficit. GCS eye subscore is 4. GCS verbal subscore is 5. GCS motor subscore is 6.   Skin: Skin is warm, dry and intact. No abrasion, no bruising and no rash noted. No erythema.   Psychiatric: He has a normal mood and affect. His speech is normal and behavior is normal. Judgment and thought content normal. Cognition and memory are normal.         ED Course   Procedures  Labs Reviewed - No data to display          Medical Decision Making:   Initial Assessment:   61-year-old male here for left ankle and foot pain after twisting his ankle 2 days ago while walking down the steps in the dark.  He reports he stumbled, but did not hit his head.  He reports immediate onset of left ankle pain that has not changed.  The patient took  "his pain medication immediately prior to arrival.  He is status post liver transplant.  No numbness, weakness, tingling, or wounds.  No previous ankle fractures.  The patient appears well, nontoxic.  Vital stable.  There is diffuse swelling of the left ankle and foot.  DP 1+ left foot.  The patient reports that he recently had a "vein procedure" to restore circulation to his right lower extremity.  Skin intact.  Differential Diagnosis:   Strain, sprain, fracture, contusion, effusion  Clinical Tests:   Radiological Study: Ordered and Reviewed  ED Management:  Xray left ankle, foot, and lower leg.   Xray read by radiologist.   Xray reveals small distal fibula fracture.  Pt placed in walking boot.  Advised f/u with ortho within 3 days.  Pt declined crutches.  Use home pain meds as prescribed. Return to the ED if condition changes, progresses, or if there are any concerns.  Pt verbalized understanding, compliance, and agreement with the treatment plan.       Imaging Results          X-Ray Tibia Fibula 2 View Left (Final result)  Result time 09/29/17 21:24:15    Final result by Kristy Nassar MD (09/29/17 21:24:15)                 Impression:      Subtle nondisplaced distal lateral malleolus fracture.      Electronically signed by: KRISTY NASSAR MD  Date:     09/29/17  Time:    21:24              Narrative:    Left foot 3 views, left ankle 3 views, and left tibia fibula 2 views.  Comparison: 7/2012.    There is subtle nondisplaced fracture involving the distal aspect of the lateral malleolus.  Mild soft tissue swelling seen in this region.  No additional fracture seen.  No evidence of dislocation.  Mild hallux valgus and degenerative changes seen at the first MTP joint.  Lisfranc articulation is congruent.  Ankle mortise is maintained.  Prominent os peroneum incidentally noted.                             X-Ray Ankle Complete Left (Final result)  Result time 09/29/17 21:24:15    Final result by Kristy Nassar MD " (09/29/17 21:24:15)                 Impression:      Subtle nondisplaced distal lateral malleolus fracture.      Electronically signed by: KRISTY NASSAR MD  Date:     09/29/17  Time:    21:24              Narrative:    Left foot 3 views, left ankle 3 views, and left tibia fibula 2 views.  Comparison: 7/2012.    There is subtle nondisplaced fracture involving the distal aspect of the lateral malleolus.  Mild soft tissue swelling seen in this region.  No additional fracture seen.  No evidence of dislocation.  Mild hallux valgus and degenerative changes seen at the first MTP joint.  Lisfranc articulation is congruent.  Ankle mortise is maintained.  Prominent os peroneum incidentally noted.                             X-Ray Foot Complete Left (Final result)  Result time 09/29/17 21:24:15    Final result by Kristy Nassar MD (09/29/17 21:24:15)                 Impression:      Subtle nondisplaced distal lateral malleolus fracture.      Electronically signed by: KRISTY NASSAR MD  Date:     09/29/17  Time:    21:24              Narrative:    Left foot 3 views, left ankle 3 views, and left tibia fibula 2 views.  Comparison: 7/2012.    There is subtle nondisplaced fracture involving the distal aspect of the lateral malleolus.  Mild soft tissue swelling seen in this region.  No additional fracture seen.  No evidence of dislocation.  Mild hallux valgus and degenerative changes seen at the first MTP joint.  Lisfranc articulation is congruent.  Ankle mortise is maintained.  Prominent os peroneum incidentally noted.                                        Attending Attestation:     Physician Attestation Statement for NP/PA:   I discussed this assessment and plan of this patient with the NP/PA, but I did not personally examine the patient. The face to face encounter was performed by the NP/PA.          agree with MLP's documentation and management.  Patient with closed left ankle fracture, nondisplaced.  Will place in splint and  given ortho followup.      Yancy Delgado MD        ED Course      Clinical Impression:   The primary encounter diagnosis was Closed left ankle fracture, initial encounter. A diagnosis of Fall was also pertinent to this visit.                           ALFONZO Marshall  09/29/17 [id number]       Yancy Delgado MD  10/01/17 [id number]

## 2017-10-02 ENCOUNTER — TELEPHONE (OUTPATIENT)
Dept: HEPATOLOGY | Facility: CLINIC | Age: 62
End: 2017-10-02

## 2017-10-02 ENCOUNTER — LAB VISIT (OUTPATIENT)
Dept: LAB | Facility: HOSPITAL | Age: 62
End: 2017-10-02
Attending: FAMILY MEDICINE
Payer: MEDICARE

## 2017-10-02 ENCOUNTER — EPISODE CHANGES (OUTPATIENT)
Dept: HEPATOLOGY | Facility: CLINIC | Age: 62
End: 2017-10-02

## 2017-10-02 ENCOUNTER — OFFICE VISIT (OUTPATIENT)
Dept: FAMILY MEDICINE | Facility: CLINIC | Age: 62
End: 2017-10-02
Payer: MEDICARE

## 2017-10-02 ENCOUNTER — LAB VISIT (OUTPATIENT)
Dept: LAB | Facility: HOSPITAL | Age: 62
End: 2017-10-02
Attending: PHYSICIAN ASSISTANT
Payer: MEDICARE

## 2017-10-02 VITALS
SYSTOLIC BLOOD PRESSURE: 130 MMHG | HEART RATE: 83 BPM | BODY MASS INDEX: 35.97 KG/M2 | WEIGHT: 271.38 LBS | OXYGEN SATURATION: 98 % | HEIGHT: 73 IN | DIASTOLIC BLOOD PRESSURE: 86 MMHG

## 2017-10-02 DIAGNOSIS — I73.9 PAD (PERIPHERAL ARTERY DISEASE): ICD-10-CM

## 2017-10-02 DIAGNOSIS — E03.9 HYPOTHYROIDISM, UNSPECIFIED TYPE: ICD-10-CM

## 2017-10-02 DIAGNOSIS — G62.9 PERIPHERAL POLYNEUROPATHY: ICD-10-CM

## 2017-10-02 DIAGNOSIS — F11.20 CHRONIC NARCOTIC DEPENDENCE: ICD-10-CM

## 2017-10-02 DIAGNOSIS — Z94.4 LIVER TRANSPLANT RECIPIENT: ICD-10-CM

## 2017-10-02 DIAGNOSIS — B18.2 CHRONIC HEPATITIS C WITHOUT HEPATIC COMA: ICD-10-CM

## 2017-10-02 DIAGNOSIS — Z86.19 HISTORY OF HEPATITIS C: ICD-10-CM

## 2017-10-02 DIAGNOSIS — B18.2 CHRONIC HEPATITIS C WITHOUT HEPATIC COMA: Primary | ICD-10-CM

## 2017-10-02 DIAGNOSIS — E78.5 HYPERLIPIDEMIA, UNSPECIFIED HYPERLIPIDEMIA TYPE: ICD-10-CM

## 2017-10-02 DIAGNOSIS — G89.4 CHRONIC PAIN SYNDROME: ICD-10-CM

## 2017-10-02 DIAGNOSIS — E11.29 UNCONTROLLED TYPE 2 DIABETES MELLITUS WITH OTHER DIABETIC KIDNEY COMPLICATION, UNSPECIFIED LONG TERM INSULIN USE STATUS: ICD-10-CM

## 2017-10-02 DIAGNOSIS — Z94.4 HISTORY OF LIVER TRANSPLANT: ICD-10-CM

## 2017-10-02 DIAGNOSIS — N18.30 CKD (CHRONIC KIDNEY DISEASE), STAGE III: ICD-10-CM

## 2017-10-02 DIAGNOSIS — Z79.60 LONG-TERM USE OF IMMUNOSUPPRESSANT MEDICATION: ICD-10-CM

## 2017-10-02 DIAGNOSIS — N52.9 ERECTILE DYSFUNCTION, UNSPECIFIED ERECTILE DYSFUNCTION TYPE: ICD-10-CM

## 2017-10-02 DIAGNOSIS — I10 ESSENTIAL HYPERTENSION: ICD-10-CM

## 2017-10-02 DIAGNOSIS — E11.65 UNCONTROLLED TYPE 2 DIABETES MELLITUS WITH OTHER DIABETIC KIDNEY COMPLICATION, UNSPECIFIED LONG TERM INSULIN USE STATUS: ICD-10-CM

## 2017-10-02 LAB
ALBUMIN SERPL BCP-MCNC: 3.4 G/DL
ALBUMIN SERPL BCP-MCNC: 3.4 G/DL
ALP SERPL-CCNC: 78 U/L
ALP SERPL-CCNC: 79 U/L
ALT SERPL W/O P-5'-P-CCNC: 25 U/L
ALT SERPL W/O P-5'-P-CCNC: 27 U/L
ANION GAP SERPL CALC-SCNC: 10 MMOL/L
ANION GAP SERPL CALC-SCNC: 8 MMOL/L
AST SERPL-CCNC: 24 U/L
AST SERPL-CCNC: 26 U/L
BASOPHILS # BLD AUTO: 0.01 K/UL
BASOPHILS NFR BLD: 0.1 %
BILIRUB SERPL-MCNC: 0.7 MG/DL
BILIRUB SERPL-MCNC: 0.7 MG/DL
BUN SERPL-MCNC: 19 MG/DL
BUN SERPL-MCNC: 19 MG/DL
CALCIUM SERPL-MCNC: 9.3 MG/DL
CALCIUM SERPL-MCNC: 9.4 MG/DL
CHLORIDE SERPL-SCNC: 107 MMOL/L
CHLORIDE SERPL-SCNC: 108 MMOL/L
CHOLEST SERPL-MCNC: 140 MG/DL
CHOLEST/HDLC SERPL: 4.7 {RATIO}
CO2 SERPL-SCNC: 21 MMOL/L
CO2 SERPL-SCNC: 23 MMOL/L
CREAT SERPL-MCNC: 1.4 MG/DL
CREAT SERPL-MCNC: 1.4 MG/DL
DIFFERENTIAL METHOD: ABNORMAL
EOSINOPHIL # BLD AUTO: 0.7 K/UL
EOSINOPHIL NFR BLD: 6.9 %
ERYTHROCYTE [DISTWIDTH] IN BLOOD BY AUTOMATED COUNT: 14.3 %
EST. GFR  (AFRICAN AMERICAN): >60 ML/MIN/1.73 M^2
EST. GFR  (AFRICAN AMERICAN): >60 ML/MIN/1.73 M^2
EST. GFR  (NON AFRICAN AMERICAN): 54.6 ML/MIN/1.73 M^2
EST. GFR  (NON AFRICAN AMERICAN): 55 ML/MIN/1.73 M^2
ESTIMATED AVG GLUCOSE: 160 MG/DL
GLUCOSE SERPL-MCNC: 112 MG/DL
GLUCOSE SERPL-MCNC: 128 MG/DL
HBA1C MFR BLD HPLC: 7.2 %
HCT VFR BLD AUTO: 37.1 %
HDLC SERPL-MCNC: 30 MG/DL
HDLC SERPL: 21.4 %
HGB BLD-MCNC: 11.9 G/DL
LDLC SERPL CALC-MCNC: 84.4 MG/DL
LYMPHOCYTES # BLD AUTO: 4 K/UL
LYMPHOCYTES NFR BLD: 38 %
MCH RBC QN AUTO: 28.3 PG
MCHC RBC AUTO-ENTMCNC: 32.1 G/DL
MCV RBC AUTO: 88 FL
MONOCYTES # BLD AUTO: 0.7 K/UL
MONOCYTES NFR BLD: 6.2 %
NEUTROPHILS # BLD AUTO: 5.1 K/UL
NEUTROPHILS NFR BLD: 48.7 %
NONHDLC SERPL-MCNC: 110 MG/DL
PLATELET # BLD AUTO: 173 K/UL
PMV BLD AUTO: 11.9 FL
POC ACTIVATED CLOTTING TIME K: 202 SEC (ref 74–137)
POC ACTIVATED CLOTTING TIME K: 224 SEC (ref 74–137)
POTASSIUM SERPL-SCNC: 4.3 MMOL/L
POTASSIUM SERPL-SCNC: 4.4 MMOL/L
PROT SERPL-MCNC: 7.3 G/DL
PROT SERPL-MCNC: 7.7 G/DL
RBC # BLD AUTO: 4.21 M/UL
SAMPLE: ABNORMAL
SAMPLE: ABNORMAL
SODIUM SERPL-SCNC: 138 MMOL/L
SODIUM SERPL-SCNC: 139 MMOL/L
TACROLIMUS BLD-MCNC: 5.3 NG/ML
TRIGL SERPL-MCNC: 128 MG/DL
TSH SERPL DL<=0.005 MIU/L-ACNC: 2.28 UIU/ML
WBC # BLD AUTO: 10.41 K/UL

## 2017-10-02 PROCEDURE — 99215 OFFICE O/P EST HI 40 MIN: CPT | Mod: S$PBB,,, | Performed by: FAMILY MEDICINE

## 2017-10-02 PROCEDURE — 3079F DIAST BP 80-89 MM HG: CPT | Mod: ,,, | Performed by: FAMILY MEDICINE

## 2017-10-02 PROCEDURE — 3075F SYST BP GE 130 - 139MM HG: CPT | Mod: ,,, | Performed by: FAMILY MEDICINE

## 2017-10-02 PROCEDURE — 99213 OFFICE O/P EST LOW 20 MIN: CPT | Mod: PBBFAC,PO | Performed by: FAMILY MEDICINE

## 2017-10-02 PROCEDURE — 36415 COLL VENOUS BLD VENIPUNCTURE: CPT

## 2017-10-02 PROCEDURE — 83036 HEMOGLOBIN GLYCOSYLATED A1C: CPT

## 2017-10-02 PROCEDURE — 99999 PR PBB SHADOW E&M-EST. PATIENT-LVL III: CPT | Mod: PBBFAC,,, | Performed by: FAMILY MEDICINE

## 2017-10-02 PROCEDURE — 80053 COMPREHEN METABOLIC PANEL: CPT

## 2017-10-02 PROCEDURE — 80061 LIPID PANEL: CPT

## 2017-10-02 PROCEDURE — 84443 ASSAY THYROID STIM HORMONE: CPT

## 2017-10-02 PROCEDURE — 85025 COMPLETE CBC W/AUTO DIFF WBC: CPT

## 2017-10-02 PROCEDURE — 80197 ASSAY OF TACROLIMUS: CPT

## 2017-10-02 PROCEDURE — 36415 COLL VENOUS BLD VENIPUNCTURE: CPT | Mod: PO

## 2017-10-02 NOTE — PROGRESS NOTES
(Portions of this note were dictated using voice recognition software and may contain dictation related errors in spelling/grammar/syntax not found on text review)    CC:   Chief Complaint   Patient presents with    Annual Exam       HPI: 61 y.o. male     Fall: ED--xray left ankle fracture: nondisplaced lateral malleolar fx, ortho appt coming up today    HTN: controlled on lisinopril 10 mg, metoprolol 50 bid, nifedipine 60.     DM: lantus 55 plus novolog 12 qac.  Blood sugars as noted below.  Readings are out of the 200 range.  No hypoglycemia.  Most fasting readings are less than 140 although in the last week he has been having a few 150s and 160s.  Postprandial readings are largely less than 160    He is due to see his eye doctor this month for routine diabetic exam    Chronic kidney disease: Last labs as below    Chronic liver disease with cirrhosis status post liver transplant.  Follows with hepatology.  On Prograf and Ribavarin    Hyperlipidemia, was on Zetia in the past but not currently.  Cardiology is entertaining putting him back on secondary to his PAD.  Cannot take statins secondary to his liver disease    L DDD and chronic pain syndrome followed by pain management, on oxycodone, gabapentin, amitriptyline.    Additionally is on Zoloft for anxiety    PAD as above, had intervention to right leg recently.  Left leg intervention also planned.  Right leg claudication is improved but still has left leg symptoms.  Also has pain in his left leg secondary to the ankle fracture as above    Complains of ED issues likely on account of his PAD and diabetes and kidney disease.  He would like to see a urologist to discuss this further.  States that he has tried PDE 5 inhibitors in the past like Viagra but cannot afford it                Past Medical History:   Diagnosis Date    Anemia     Anxiety     Chronic hepatitis C virus infection - RELAPSE following harvoni + RBV 8/23/2011    Completed 12 weeks Harvoni + RBV  w/ RELAPSE 6 weeks out (3/2016)  Wk 1 Hgb 12.4, prograf 6.8 Wk 2 Hgb 12.7 Wk 4 Hgb 12.8, prograf 5.4; HCV RNA 47. INCREASE RBV to 600 Wk 5 Hgb 12.6 Wk 6 Hgb 12.4, HCV RNA <12, detected. INCREASE  Wk 7 Hgb 11.8 Wk 8 Hgb 11.7, prograf 4.0. INCREASE RBV 1000, INCREASE PROGRAF 2/1 Wk 9 Hgb 12.4, prograf 3.6  HCV RNA <12, detected. INCREASE PROGRAF 2/2 Wk 10 hgb     Chronic pain syndrome 7/13/2011    CKD (chronic kidney disease), stage III     Diabetes mellitus type II, uncontrolled     Discitis of lumbosacral region 1/16/2015    ED (erectile dysfunction)     Genital herpes     Gout, arthritis     History of alcohol abuse     History of positive PPD, treatment status unknown     Pulmonary granulomas, negative sputum cultures for AFB and indeterminate quantferon test    History of substance abuse     Hypertension     Hypothyroidism     Peptic ulcer disease        Past Surgical History:   Procedure Laterality Date    CHOLECYSTECTOMY      LIVER TRANSPLANT  06/2010    SPINE SURGERY         Family History   Problem Relation Age of Onset    Cancer Mother     Diabetes Mother     Heart disease Mother     Diabetes Sister     Cancer Maternal Uncle 82     colon CA    Melanoma Neg Hx     Psoriasis Neg Hx     Lupus Neg Hx     Eczema Neg Hx     Acne Neg Hx        Social History     Social History    Marital status:      Spouse name: N/A    Number of children: N/A    Years of education: N/A     Occupational History    Not on file.     Social History Main Topics    Smoking status: Former Smoker    Smokeless tobacco: Never Used    Alcohol use No      Comment: over 5 years ago, none currently    Drug use: No    Sexual activity: Not on file     Other Topics Concern    Not on file     Social History Narrative    No narrative on file     SCREENINGS  Colonoscopy 2015, return in 10 years  prostate testing 2016/8      Immunizations  Pneumovax up-to-date 2008  Hepatitis B series  up-to-date  Patient states he had tetanus shot within the last 10 years  PCV needed  Flu:  Needed       Lab Results   Component Value Date    WBC 10.29 09/27/2017    HGB 12.1 (L) 09/27/2017    HCT 37.6 (L) 09/27/2017     09/27/2017    CHOL 139 03/27/2017    TRIG 305 (H) 03/27/2017    HDL 25 (L) 03/27/2017    ALT 32 09/25/2017    AST 32 09/25/2017     09/27/2017    K 3.9 09/27/2017     09/27/2017    CREATININE 1.2 09/27/2017    BUN 16 09/27/2017    CO2 22 (L) 09/27/2017    TSH 3.190 02/27/2017    PSA 0.30 09/06/2016    INR 1.0 09/27/2017    HGBA1C 9.0 (H) 02/21/2017    LDLCALC 53.0 (L) 03/27/2017     (H) 09/27/2017     Hemoglobin A1C   Date Value Ref Range Status   02/21/2017 9.0 (H) 4.5 - 6.2 % Final     Comment:     According to ADA guidelines, hemoglobin A1C <7.0% represents  optimal control in non-pregnant diabetic patients.  Different  metrics may apply to specific populations.   Standards of Medical Care in Diabetes - 2016.  For the purpose of screening for the presence of diabetes:  <5.7%     Consistent with the absence of diabetes  5.7-6.4%  Consistent with increasing risk for diabetes   (prediabetes)  >or=6.5%  Consistent with diabetes  Currently no consensus exists for use of hemoglobin A1C  for diagnosis of diabetes for children.     10/04/2016 13.9 (H) 4.5 - 6.2 % Final     Comment:     According to ADA guidelines, hemoglobin A1C <7.0% represents  optimal control in non-pregnant diabetic patients.  Different  metrics may apply to specific populations.   Standards of Medical Care in Diabetes - 2016.  For the purpose of screening for the presence of diabetes:  <5.7%     Consistent with the absence of diabetes  5.7-6.4%  Consistent with increasing risk for diabetes   (prediabetes)  >or=6.5%  Consistent with diabetes  Currently no consensus exists for use of hemoglobin A1C  for diagnosis of diabetes for children.     09/06/2016 8.3 (H) 4.5 - 6.2 % Final     Comment:     According to  ADA guidelines, hemoglobin A1C <7.0% represents  optimal control in non-pregnant diabetic patients.  Different  metrics may apply to specific populations.   Standards of Medical Care in Diabetes - 2016.  For the purpose of screening for the presence of diabetes:  <5.7%     Consistent with the absence of diabetes  5.7-6.4%  Consistent with increasing risk for diabetes   (prediabetes)  >or=6.5%  Consistent with diabetes  Currently no consensus exists for use of hemoglobin A1C  for diagnosis of diabetes for children.     01/19/2016 5.7 4.5 - 6.2 % Final          ROS:  GENERAL: No fever, chills, fatigability or weight loss.  SKIN: No rashes, no itching.  HEAD: No headaches.  EYES: No visual changes  EARS: No ear pain or changes in hearing.  NOSE: No congestion or rhinorrhea.  MOUTH & THROAT: No hoarseness, change in voice, or sore throat.  NODES: Denies swollen glands.  CHEST: Denies NARAYANAN, cyanosis, wheezing, cough and sputum production.  CARDIOVASCULAR: Denies chest pain, PND, orthopnea.  ABDOMEN: No nausea, vomiting, or changes in bowel function.  URINARY: No flank pain, dysuria or hematuria.  PERIPHERAL VASCULAR: Above.  MUSCULOSKELETAL: Above  NEUROLOGIC: Above    Vital signs reviewed  PE:   APPEARANCE: Well nourished, well developed, in no acute distress.    HEAD: Normocephalic, atraumatic.  EYES: PERRL. EOMI.   Conjunctivae noninjected.  EARS: TM's intact. Light reflex normal. No retraction or perforation  NOSE: Mucosa pink. Airway clear.  MOUTH & THROAT: No tonsillar enlargement. No pharyngeal erythema or exudate.   NECK: Supple with no cervical lymphadenopathy.  No carotid bruits.  No thyromegaly  CHEST: Good inspiratory effort. Lungs clear to auscultation with no wheezes or crackles.  CARDIOVASCULAR: Normal S1, S2. No rubs, murmurs, or gallops.  ABDOMEN: Bowel sounds normal. Not distended. Soft. No tenderness.  EXTREMITIES: Left ankle in a boot.  Right leg demonstrates 1+ pitting edema      IMPRESSION  1.  Uncontrolled type 2 diabetes mellitus with diabetic neuropathy, with long-term current use of insulin    2. CKD (chronic kidney disease), stage III    3. Hyperlipidemia, unspecified hyperlipidemia type    4. Hypothyroidism, unspecified type    5. Essential hypertension    6. Erectile dysfunction, unspecified erectile dysfunction type    7. Liver transplant recipient    8. History of hepatitis C    9. Chronic pain syndrome    10. Peripheral polyneuropathy    11. Chronic narcotic dependence    12. PAD (peripheral artery disease)            PLAN  HTN: controlled.  Continue current therapy    DM:   Diabetes health maintenance  -advise proper dietary and exercise modification  -advise medication compliance  -advise daily aspirin if there are no other contraindications  -advise consistent blood sugar monitoring  -advise regular dentist and ophthalmology visits  -advise regular foot checks  -Medication management: Increase Lantus to 60 units daily.  Continue NovoLog 12 with each meal    PAD: Follow up with cardiology as directed    Ankle fracture: He has a follow-up coming up today with orthopedics    Cirrhosis status post liver transplant, continue follow-up with pathology    Chronic pain syndrome: Continue follow-up with pain management    Hypothyroidism: Continue Synthroid.  Check labs    ED: Patient request urology referral, provided    Chronic disease stage III: Continue follow-up with lab work    TSH ordered along with CBC, CMP, lipids, A1c from prior order    Health maintenance:  Colonoscopy up-to-date  Tetanus up-to-date  Pneumovax up-to-date  Prevnar and flu needed,  prescription given for local pharmacy downstairs  Prostate screening discussed.  Patient declines at this time  Foot exam up to date  He will see his eye doctor this month

## 2017-10-02 NOTE — TELEPHONE ENCOUNTER
HCV LAB REVIEW  Week 15 of Epclusa + Ribavirin 600mg, planning on 24 weeks treatment  Aixa 1b  Harvoni + RBV failure  Post transplant 12/2013  Cirrhosis in transplanted liver    Pertinent labs:  10/2  CBC stable, Hgb 11.9  CMP stable  Tac 5.3    pls call pt:  Liver labs look good. Anemia level is stable. Prograf in good range    - Continue Epclusa - 1 pill daily - don't miss any doses.  - CONTINUE Ribavirin 800mg daily - FOUR 200mg pills each day (can be all at once or  2 in AM and 2 in PM)  - Continue prograf Prograf 2mg AM and 1mg in PM    Next labs due - pls schedule  -  CBC, CMP, prograf - wk 17 - 10/6  -  CBC, CMP, prograf - wk 20 - 11/6  -  CBC, CMP, Prograf, HCV RNA - wk 24 - end of Rx - 12/4

## 2017-10-03 NOTE — TELEPHONE ENCOUNTER
Attempted to speak with pt or his wife. Left message from provider. Asked that they call back with any concerns.   Upcoming labs scheduled, reminders mailed.

## 2017-10-07 RX ORDER — METOPROLOL TARTRATE 50 MG/1
TABLET ORAL
Qty: 180 TABLET | Refills: 3 | Status: SHIPPED | OUTPATIENT
Start: 2017-10-07 | End: 2018-03-27

## 2017-10-09 ENCOUNTER — OFFICE VISIT (OUTPATIENT)
Dept: UROLOGY | Facility: CLINIC | Age: 62
End: 2017-10-09
Payer: MEDICARE

## 2017-10-09 VITALS
SYSTOLIC BLOOD PRESSURE: 165 MMHG | HEIGHT: 74 IN | DIASTOLIC BLOOD PRESSURE: 97 MMHG | WEIGHT: 315 LBS | HEART RATE: 86 BPM | BODY MASS INDEX: 40.43 KG/M2

## 2017-10-09 DIAGNOSIS — I10 ESSENTIAL HYPERTENSION: ICD-10-CM

## 2017-10-09 DIAGNOSIS — N40.1 BPH WITH URINARY OBSTRUCTION: ICD-10-CM

## 2017-10-09 DIAGNOSIS — E78.1 HYPERTRIGLYCERIDEMIA: ICD-10-CM

## 2017-10-09 DIAGNOSIS — N13.8 BPH WITH URINARY OBSTRUCTION: ICD-10-CM

## 2017-10-09 DIAGNOSIS — N52.01 ERECTILE DYSFUNCTION DUE TO ARTERIAL INSUFFICIENCY: Primary | ICD-10-CM

## 2017-10-09 PROCEDURE — 99999 PR PBB SHADOW E&M-EST. PATIENT-LVL III: CPT | Mod: PBBFAC,,, | Performed by: UROLOGY

## 2017-10-09 PROCEDURE — 99213 OFFICE O/P EST LOW 20 MIN: CPT | Mod: PBBFAC | Performed by: UROLOGY

## 2017-10-09 PROCEDURE — 99204 OFFICE O/P NEW MOD 45 MIN: CPT | Mod: S$PBB,,, | Performed by: UROLOGY

## 2017-10-09 RX ORDER — SILDENAFIL CITRATE 20 MG/1
TABLET ORAL
Qty: 50 TABLET | Refills: 11 | Status: SHIPPED | OUTPATIENT
Start: 2017-10-09 | End: 2019-03-01 | Stop reason: SDUPTHER

## 2017-10-09 NOTE — PATIENT INSTRUCTIONS
Oral Medications for Erectile Dysfunction  Prescription oral medications can be used for ED. They often work well. But, like all medications, they can have side effects. Also, they cant be used if a man has certain health problems or takes certain other medications. Talk with your doctor about oral ED medication. Ask whether it is right for you.  Types of Oral ED Medications  There are three types of prescription oral ED medications. Each one increases blood flow to the penis. When the penis is stimulated, an erection results. The three types are:  · Sildenafil citrate (Viagra)  · Tadalafil (Cialis)  · Vardenafil HCl (Levitra)  What Oral ED Medications Dont Do  There are some things ED medications cant do.  · They dont cure the cause of your ED. Without the medication, youll still have trouble getting an erection.  · They cant produce an erection without sexual stimulation.  · They wont increase sexual desire. They also wont solve any other sexual issues. Psychological, emotional, or relationship issues will not be fixed.  Taking Oral ED Medications Safely  · Do not combine ED medications with other treatments unless your doctor tells you to.  · Dont take ED medications more often or in larger doses than prescribed.  · Tell your doctor your health history. Mention all medications you take. This includes over-the-counter drugs, supplements, and herbs.  · Ask your doctor about whether you can drink alcohol while taking ED medication.  Possible Side Effects of Oral ED Medications  · Headache  · Facial flushing  · Runny or stuffy nose  · Indigestion  · Distortion of your color vision for a short time  · Sudden vision loss or hearing loss (rare)  Risks of Oral ED Medications   · Do not take ED medications if you take medications containing nitrates. The combination may drop your blood pressure to a dangerous level. Nitrates include nitroglycerin (a drug for angina). They are also in poppers, an inhaled  recreational drug. If youre not sure whether youre taking nitrates, ask your doctor or pharmacist.  · Medications called alpha-blockers can interact with ED medications. They can cause a sudden drop in blood pressure. Alpha-blockers are a common treatment for prostate problems. They also treat high blood pressure. Be sure your doctor knows if you take these medications.  · If youve had a heart attack or have heart disease and you have not had sex for a while, talk to your doctor. Having sex again can put extra strain on your heart. Your doctor can confirm that your heart is healthy enough for sex.  · It is rare, but some men taking ED medications have had sudden vision loss. This may be more likely if other health problems are present. These include high blood pressure and diabetes. Ask your doctor if you are at risk for this type of vision loss.  · In rare cases, an erection may last too long. This can badly damage the blood vessels in your penis. If an erection lasts longer than 4 hours, go to the emergency room right away.       © 8996-2142 [employer], [address] [place] [address], [place] [address]. All rights reserved. This information is not intended as a substitute for professional medical care. Always follow your healthcare professional's instructions.

## 2017-10-09 NOTE — PROGRESS NOTES
CHIEF COMPLAINT:    Mr. Gonzalez is a 61 y.o. male presenting for a consultation at the request of Dr. Lopez. Patient presents with ED.    PRESENTING ILLNESS:    Vick Gonzalez is a 61 y.o. male with hep c s/p liver transplant who c/o ED.  He's never tried a PDE-5i due to cost.  His T is normal.    He has nocturia x 2.  Good FOS.  No hematuria.  No dysuria.  He's pleased with how he voids.    REVIEW OF SYSTEMS:    Vick Gonzalez denies any history of headache, blurred vision, fever, nausea, vomiting, chills, abdominal pain, bleeding per rectum, cough, SOB, recent loss of consciousness, recent mental status changes, seizures, dizziness, or upper or lower extremity weakness.    TROY  1. 1  2. 0  3. 0  4. 1  5. 1      PATIENT HISTORY:    Past Medical History:   Diagnosis Date    Anemia     Anxiety     Chronic hepatitis C virus infection - RELAPSE following harvoni + RBV 8/23/2011    Completed 12 weeks Harvoni + RBV w/ RELAPSE 6 weeks out (3/2016)  Wk 1 Hgb 12.4, prograf 6.8 Wk 2 Hgb 12.7 Wk 4 Hgb 12.8, prograf 5.4; HCV RNA 47. INCREASE RBV to 600 Wk 5 Hgb 12.6 Wk 6 Hgb 12.4, HCV RNA <12, detected. INCREASE  Wk 7 Hgb 11.8 Wk 8 Hgb 11.7, prograf 4.0. INCREASE RBV 1000, INCREASE PROGRAF 2/1 Wk 9 Hgb 12.4, prograf 3.6  HCV RNA <12, detected. INCREASE PROGRAF 2/2 Wk 10 hgb     Chronic pain syndrome 7/13/2011    CKD (chronic kidney disease), stage III     Diabetes mellitus type II, uncontrolled     Discitis of lumbosacral region 1/16/2015    ED (erectile dysfunction)     Genital herpes     Gout, arthritis     History of alcohol abuse     History of positive PPD, treatment status unknown     Pulmonary granulomas, negative sputum cultures for AFB and indeterminate quantferon test    History of substance abuse     Hypertension     Hypothyroidism     Liver replaced by transplant 8/23/2011    DATE: 12/16/2013  LIVER BIOPSY : REASON:  hep C staging  PATHOLOGY COMMITTEE NOTE/PLAN: :grade  1 / stage 1         Peptic ulcer disease        Past Surgical History:   Procedure Laterality Date    CHOLECYSTECTOMY      LIVER TRANSPLANT  06/2010    SPINE SURGERY         Family History   Problem Relation Age of Onset    Cancer Mother     Diabetes Mother     Heart disease Mother     Diabetes Sister     Cancer Maternal Uncle 82     colon CA    Melanoma Neg Hx     Psoriasis Neg Hx     Lupus Neg Hx     Eczema Neg Hx     Acne Neg Hx        Social History     Social History    Marital status:      Spouse name: N/A    Number of children: N/A    Years of education: N/A     Occupational History    Not on file.     Social History Main Topics    Smoking status: Former Smoker    Smokeless tobacco: Never Used    Alcohol use No      Comment: over 5 years ago, none currently    Drug use: No    Sexual activity: Not on file     Other Topics Concern    Not on file     Social History Narrative    No narrative on file       Allergies:  Review of patient's allergies indicates no known allergies.    Medications:    Current Outpatient Prescriptions:     allopurinol (ZYLOPRIM) 100 MG tablet, Take 1 tablet (100 mg total) by mouth 2 (two) times daily., Disp: 60 tablet, Rfl: 11    amitriptyline (ELAVIL) 25 MG tablet, Take 1 tablet (25 mg total) by mouth every evening. For nerve pain and sleep, Disp: 30 tablet, Rfl: 11    blood sugar diagnostic Strp, 1 strip by Misc.(Non-Drug; Combo Route) route 4 (four) times daily before meals and nightly., Disp: 100 strip, Rfl: 11    blood-glucose meter Misc, 1 each by Misc.(Non-Drug; Combo Route) route 4 (four) times daily before meals and nightly., Disp: 1 each, Rfl: 0    calcium carbonate-vitamin D3 (CALTRATE 600 + D) 600 mg(1,500mg) -400 unit Chew, , Disp: , Rfl:     EPCLUSA 400-100 mg Tab, Take 1 tablet by mouth once daily., Disp: 28 tablet, Rfl: 5    famotidine (PEPCID) 40 MG tablet, Take 1 tablet (40 mg total) by mouth once daily., Disp: 30 tablet, Rfl: 2    gabapentin  "(NEURONTIN) 600 MG tablet, Take 600 mg by mouth 4 (four) times daily., Disp: , Rfl: 2    insulin aspart (NOVOLOG) 100 unit/mL InPn pen, Inject 12 Units into the skin 3 (three) times daily with meals., Disp: 10.8 mL, Rfl: 11    insulin detemir (LEVEMIR FLEXTOUCH) 100 unit/mL (3 mL) SubQ InPn pen, Inject 55 Units into the skin every evening., Disp: 15 mL, Rfl: 11    lancets Misc, 1 each by Misc.(Non-Drug; Combo Route) route 4 (four) times daily before meals and nightly., Disp: 200 each, Rfl: 11    lancing device Misc, 1 each by Misc.(Non-Drug; Combo Route) route 4 (four) times daily before meals and nightly., Disp: 1 each, Rfl: 0    levothyroxine (SYNTHROID) 88 MCG tablet, TAKE 1 TABLET (88 MCG TOTAL) BY MOUTH ONCE DAILY., Disp: 30 tablet, Rfl: 6    lisinopril 10 MG tablet, TAKE 1 TABLET (10 MG TOTAL) BY MOUTH ONCE DAILY., Disp: 90 tablet, Rfl: 2    metoprolol tartrate (LOPRESSOR) 50 MG tablet, TAKE 3 TABLETS BY MOUTH 2 (TWO) TIMES DAILY., Disp: 180 tablet, Rfl: 3    multivitamin (THERAGRAN) per tablet, Take 1 tablet by mouth., Disp: , Rfl:     nifedipine (ADALAT CC) 60 MG TbSR, Take 1 tablet (60 mg total) by mouth once daily., Disp: 90 tablet, Rfl: 11    oxycodone (ROXICODONE) 15 MG Tab, Take 1 tablet (15 mg total) by mouth 4 (four) times daily as needed for Pain., Disp: 120 tablet, Rfl: 0    pen needle, diabetic (BD ULTRA-FINE MENDY PEN NEEDLES) 32 gauge x 5/32" Ndle, Use with aspart TIDWM and with detemir QHS, Disp: 100 each, Rfl: 12    ribavirin (COPEGUS) 200 MG tablet, Take as directed. Initial dose will be 3 tablets by PO once daily. Will increase as tolerated to 2 tablets PO BID, Disp: 112 tablet, Rfl: 5    sertraline (ZOLOFT) 100 MG tablet, Take 1 tablet (100 mg total) by mouth once daily., Disp: 30 tablet, Rfl: 11    sildenafil (VIAGRA) 100 MG tablet, Take 1 tablet (100 mg total) by mouth daily as needed for Erectile Dysfunction., Disp: 30 tablet, Rfl: 11    tacrolimus (PROGRAF) 1 MG Cap, Take " 2mg in AM and 1mg in PM, PO. Liver Transplant Z94.4, Disp: 90 capsule, Rfl: 11    PHYSICAL EXAMINATION:    The patient generally appears in good health, is appropriately interactive, and is in no apparent distress.     Eyes: anicteric sclerae, moist conjunctivae; no lid-lag; PERRLA     HENT: Atraumatic; oropharynx clear with moist mucous membranes and no mucosal ulcerations;normal hard and soft palate.  No evidence of lymphadenopathy.    Neck: Trachea midline.  No thyromegaly.    Musculoskeletal: No abnormal gait.    Skin: No lesions.    Mental: Cooperative with normal affect.  Is oriented to time, place, and person.    Neuro: Grossly intact.    Chest: Normal inspiratory effort.   No accessory muscles.  No audible wheezes.  Respirations symmetric on inspiration and expiration.    Heart: Regular rhythm.      Abdomen:  Soft, non-tender. No masses or organomegaly. Bladder is not palpable. No evidence of flank discomfort. No evidence of inguinal hernia.    Genitourinary: The penis is not circumcised with no evidence of plaques or induration. The urethral meatus is normal. The testes, epididymides, and cord structures are normal in size and contour bilaterally. The scrotum is normal in size and contour.    Normal anal sphincter tone. No rectal mass.    The prostate is 30 g. Normal landmarks. Lateral sulci. Median furrow intact.  No nodularity or induration. Seminal vesicles are normal.    Extremities: No clubbing, cyanosis, or edema      LABS:      Lab Results   Component Value Date    PSA 0.30 09/06/2016    PSA 0.80 07/13/2012    PSA 0.53 05/12/2011       IMPRESSION:    Encounter Diagnoses   Name Primary?    Erectile dysfunction due to arterial insufficiency Yes    BPH with urinary obstruction     Essential hypertension     Hypertriglyceridemia    HTN, controlled  Hyperlipidemia, controlled      PLAN:    1. Will draw a PSA as he's past due for this.  2. Discussed options for his ED.  He can't afford 2nd line  therapies.  Will try generic sildenafil. Side effects discussed.  A new Rx was given  3. RTC 6 months to see an MITCHEL.    Copy to:

## 2017-10-09 NOTE — LETTER
October 9, 2017      Emanuel Lopez MD  [address]  [address]  [facility] [address]           [facility] - Urology 4th Floor  [address]  [place] [address]  Phone: [phone]          Patient: Vick Gonzalez   MR Number: [id number]   YOB: 1955   Date of Visit: 10/9/2017       Dear Dr. Emanuel Lopez:    Thank you for referring Vick Gonzalez to me for evaluation. Attached you will find relevant portions of my assessment and plan of care.    If you have questions, please do not hesitate to call me. I look forward to following Vick Gonzalez along with you.    Sincerely,    Shane Guerra MD    Enclosure  CC:  No Recipients    If you would like to receive this communication electronically, please contact [email or web address] or [phone] to request more information on [employer]. Link access.    For providers and/or their staff who would like to refer a patient to [facility], please contact us through our one-stop-shop provider referral line, [facility], at [phone].    If you feel you have received this communication in error or would no longer like to receive these types of communications, please e-mail [email or web address]

## 2017-10-10 DIAGNOSIS — I73.9 CLAUDICATION: ICD-10-CM

## 2017-10-10 DIAGNOSIS — I73.9 PAD (PERIPHERAL ARTERY DISEASE): Primary | ICD-10-CM

## 2017-10-10 RX ORDER — DIPHENHYDRAMINE HCL 25 MG
50 CAPSULE ORAL ONCE
Status: CANCELLED | OUTPATIENT
Start: 2017-10-10 | End: 2017-10-10

## 2017-10-10 RX ORDER — SODIUM CHLORIDE 9 MG/ML
3 INJECTION, SOLUTION INTRAVENOUS CONTINUOUS
Status: CANCELLED | OUTPATIENT
Start: 2017-10-10 | End: 2017-10-10

## 2017-10-11 ENCOUNTER — TELEPHONE (OUTPATIENT)
Dept: FAMILY MEDICINE | Facility: CLINIC | Age: 62
End: 2017-10-11

## 2017-10-11 NOTE — TELEPHONE ENCOUNTER
----- Message from Jasmyne Schneider sent at 10/11/2017 10:02 AM CDT -----  Cami with [employer] called.   No. [phone]    The stamp was cut off from the last page of notes.   Please refax.     Fax no. [phone]

## 2017-10-12 ENCOUNTER — HOSPITAL ENCOUNTER (EMERGENCY)
Facility: HOSPITAL | Age: 62
Discharge: HOME | End: 2017-10-12
Payer: MEDICARE

## 2017-10-12 VITALS
HEIGHT: 73 IN | RESPIRATION RATE: 15 BRPM | DIASTOLIC BLOOD PRESSURE: 99 MMHG | WEIGHT: 260 LBS | HEART RATE: 101 BPM | TEMPERATURE: 99 F | OXYGEN SATURATION: 96 % | SYSTOLIC BLOOD PRESSURE: 176 MMHG | BODY MASS INDEX: 34.46 KG/M2

## 2017-10-12 DIAGNOSIS — M10.9 ACUTE GOUT OF RIGHT WRIST, UNSPECIFIED CAUSE: Primary | ICD-10-CM

## 2017-10-12 PROCEDURE — 99283 EMERGENCY DEPT VISIT LOW MDM: CPT | Mod: 25

## 2017-10-12 PROCEDURE — 63600175 PHARM REV CODE 636 W HCPCS: Performed by: EMERGENCY MEDICINE

## 2017-10-12 PROCEDURE — 96372 THER/PROPH/DIAG INJ SC/IM: CPT

## 2017-10-12 RX ORDER — OXYCODONE HYDROCHLORIDE 5 MG/1
15 TABLET ORAL
Status: DISCONTINUED | OUTPATIENT
Start: 2017-10-12 | End: 2017-10-12 | Stop reason: HOSPADM

## 2017-10-12 RX ORDER — METHYLPREDNISOLONE SOD SUCC 125 MG
125 VIAL (EA) INJECTION
Status: COMPLETED | OUTPATIENT
Start: 2017-10-12 | End: 2017-10-12

## 2017-10-12 RX ORDER — PREDNISONE 20 MG/1
40 TABLET ORAL DAILY
Qty: 10 TABLET | Refills: 0 | Status: SHIPPED | OUTPATIENT
Start: 2017-10-12 | End: 2017-10-17

## 2017-10-12 RX ADMIN — METHYLPREDNISOLONE SODIUM SUCCINATE 125 MG: 125 INJECTION, POWDER, FOR SOLUTION INTRAMUSCULAR; INTRAVENOUS at 02:10

## 2017-10-12 NOTE — ED NOTES
Pt presents to ED c/o right wrist pain X today. Pt reports hx of gout. Reports right wrist is gout site. Pt states he has been out of pain medication since the beginning of the week.

## 2017-10-12 NOTE — ED PROVIDER NOTES
Encounter Date: 10/12/2017    SCRIBE #1 NOTE: I, Jnaeen Inocente, am scribing for, and in the presence of, Dr. Robin.       History     Chief Complaint   Patient presents with    Gout     reports gout to right wrist. pain has been present today. reports tea oil on right wrist, with no relief. deneis taking medication for pain. presents to triage with sock over hand.      Time seen by provider: 1:55 AM    This is a 61 y.o. male with a PMHx of gout, HTN, chronic pain, CKD, DM and who is 10 years status post liver transplant presents with complaint of gout pain to the right wrist. The patient has had gout in this joint before and states it feels exactly similar to previous gout attacks. The patient's pain began almost 24 hours ago. He has severe pain to the wrist and cannot move it comfortably. He states the joint is warm and pain is worse with touch and movement.       The history is provided by the patient.     Review of patient's allergies indicates:  No Known Allergies  Past Medical History:   Diagnosis Date    Anemia     Anxiety     Chronic hepatitis C virus infection - RELAPSE following harvoni + RBV 8/23/2011    Completed 12 weeks Harvoni + RBV w/ RELAPSE 6 weeks out (3/2016)  Wk 1 Hgb 12.4, prograf 6.8 Wk 2 Hgb 12.7 Wk 4 Hgb 12.8, prograf 5.4; HCV RNA 47. INCREASE RBV to 600 Wk 5 Hgb 12.6 Wk 6 Hgb 12.4, HCV RNA <12, detected. INCREASE  Wk 7 Hgb 11.8 Wk 8 Hgb 11.7, prograf 4.0. INCREASE RBV 1000, INCREASE PROGRAF 2/1 Wk 9 Hgb 12.4, prograf 3.6  HCV RNA <12, detected. INCREASE PROGRAF 2/2 Wk 10 hgb     Chronic pain syndrome 7/13/2011    CKD (chronic kidney disease), stage III     Diabetes mellitus type II, uncontrolled     Discitis of lumbosacral region 1/16/2015    ED (erectile dysfunction)     Genital herpes     Gout, arthritis     History of alcohol abuse     History of positive PPD, treatment status unknown     Pulmonary granulomas, negative sputum cultures for AFB and indeterminate  quantferon test    History of substance abuse     Hypertension     Hypothyroidism     Liver replaced by transplant 8/23/2011    DATE: 12/16/2013  LIVER BIOPSY : REASON:  hep C staging  PATHOLOGY COMMITTEE NOTE/PLAN: :grade  1 / stage 1        Peptic ulcer disease      Past Surgical History:   Procedure Laterality Date    CHOLECYSTECTOMY      LIVER TRANSPLANT  06/2010    SPINE SURGERY       Family History   Problem Relation Age of Onset    Cancer Mother     Diabetes Mother     Heart disease Mother     Diabetes Sister     Cancer Maternal Uncle 82     colon CA    Melanoma Neg Hx     Psoriasis Neg Hx     Lupus Neg Hx     Eczema Neg Hx     Acne Neg Hx      Social History   Substance Use Topics    Smoking status: Former Smoker    Smokeless tobacco: Never Used    Alcohol use No      Comment: over 5 years ago, none currently     Review of Systems   Constitutional: Negative for fever.   HENT: Negative for facial swelling.    Eyes: Negative for visual disturbance.   Respiratory: Negative for shortness of breath.    Cardiovascular: Negative for chest pain.   Gastrointestinal: Negative for abdominal distention.   Musculoskeletal: Positive for arthralgias and joint swelling.   Skin: Negative for rash.   Neurological: Negative for speech difficulty.       Physical Exam     Initial Vitals [10/12/17 0143]   BP Pulse Resp Temp SpO2   (!) 176/99 101 15 98.5 °F (36.9 °C) 96 %      MAP       124.67         Physical Exam    Nursing note and vitals reviewed.  Constitutional: He appears well-developed and well-nourished. He is not diaphoretic. No distress.   HENT:   Head: Normocephalic and atraumatic.   Eyes: Conjunctivae are normal. No scleral icterus.   Neck: Normal range of motion. Neck supple.   Cardiovascular: Regular rhythm.   Pulmonary/Chest: No respiratory distress.   Abdominal: He exhibits no distension.   Musculoskeletal: He exhibits edema and tenderness.   Swelling to right wrist with effusion and  tenderness.    Neurological: He is alert and oriented to person, place, and time. No sensory deficit.   Skin: Skin is warm and dry.   Psychiatric: He has a normal mood and affect. His behavior is normal.         ED Course   Procedures  Labs Reviewed - No data to display          Medical Decision Making:   History:   Old Medical Records: I decided to obtain old medical records.  ED Management:  Vick Gonzalez is a 61 y.o. male who presents with  isolated swelling of the wrist which previously been attributed to a gout flare.  The symptoms are most suggestive of gouty arthritis.  He is given steroids and referred to primary care.  The recurrent nature of the arthritis except arthritis unlikely.  Involvement of a solitary joint makes Lupus, rheumatoid arthritis and other inflammatory arthritis unlikely.       APC / Resident Notes:   I, Dr. Yash Robin III, personally performed the services described in this documentation. All medical record entries made by the scribe were at my direction and in my presence.  I have reviewed the chart and agree that the record reflects my personal performance and is accurate and complete. Yash Robin III, MD.  8:59 PM 10/17/2017              ED Course      Clinical Impression:   There were no encounter diagnoses.                           Yash Robin III, MD  10/17/17 2100

## 2017-10-16 ENCOUNTER — TELEPHONE (OUTPATIENT)
Dept: HEPATOLOGY | Facility: CLINIC | Age: 62
End: 2017-10-16

## 2017-10-16 ENCOUNTER — OFFICE VISIT (OUTPATIENT)
Dept: TRANSPLANT | Facility: CLINIC | Age: 62
End: 2017-10-16
Payer: MEDICARE

## 2017-10-16 ENCOUNTER — LAB VISIT (OUTPATIENT)
Dept: LAB | Facility: HOSPITAL | Age: 62
End: 2017-10-16
Payer: MEDICARE

## 2017-10-16 VITALS
DIASTOLIC BLOOD PRESSURE: 99 MMHG | SYSTOLIC BLOOD PRESSURE: 155 MMHG | RESPIRATION RATE: 18 BRPM | WEIGHT: 266.31 LBS | OXYGEN SATURATION: 96 % | BODY MASS INDEX: 34.18 KG/M2 | HEIGHT: 74 IN | HEART RATE: 88 BPM | TEMPERATURE: 99 F

## 2017-10-16 DIAGNOSIS — B18.2 CHRONIC HEPATITIS C WITHOUT HEPATIC COMA: ICD-10-CM

## 2017-10-16 DIAGNOSIS — Z94.4 STATUS POST LIVER TRANSPLANT: ICD-10-CM

## 2017-10-16 DIAGNOSIS — Z94.4 HISTORY OF LIVER TRANSPLANT: ICD-10-CM

## 2017-10-16 DIAGNOSIS — B18.2 CHRONIC HEPATITIS C WITHOUT HEPATIC COMA: Primary | ICD-10-CM

## 2017-10-16 DIAGNOSIS — Z79.60 LONG-TERM USE OF IMMUNOSUPPRESSANT MEDICATION: ICD-10-CM

## 2017-10-16 DIAGNOSIS — D84.9 IMMUNOSUPPRESSION: ICD-10-CM

## 2017-10-16 LAB
ALBUMIN SERPL BCP-MCNC: 3 G/DL
ALP SERPL-CCNC: 126 U/L
ALT SERPL W/O P-5'-P-CCNC: 19 U/L
ANION GAP SERPL CALC-SCNC: 10 MMOL/L
AST SERPL-CCNC: 12 U/L
BASOPHILS # BLD AUTO: 0.03 K/UL
BASOPHILS NFR BLD: 0.2 %
BILIRUB SERPL-MCNC: 0.6 MG/DL
BUN SERPL-MCNC: 24 MG/DL
CALCIUM SERPL-MCNC: 8.8 MG/DL
CHLORIDE SERPL-SCNC: 111 MMOL/L
CO2 SERPL-SCNC: 19 MMOL/L
CREAT SERPL-MCNC: 1.3 MG/DL
DIFFERENTIAL METHOD: ABNORMAL
EOSINOPHIL # BLD AUTO: 0.3 K/UL
EOSINOPHIL NFR BLD: 1.8 %
ERYTHROCYTE [DISTWIDTH] IN BLOOD BY AUTOMATED COUNT: 13.9 %
EST. GFR  (AFRICAN AMERICAN): >60 ML/MIN/1.73 M^2
EST. GFR  (NON AFRICAN AMERICAN): 59.7 ML/MIN/1.73 M^2
GLUCOSE SERPL-MCNC: 248 MG/DL
HCT VFR BLD AUTO: 36.2 %
HGB BLD-MCNC: 11.4 G/DL
IMM GRANULOCYTES # BLD AUTO: 0.07 K/UL
IMM GRANULOCYTES NFR BLD AUTO: 0.5 %
LYMPHOCYTES # BLD AUTO: 5.3 K/UL
LYMPHOCYTES NFR BLD: 37.7 %
MCH RBC QN AUTO: 27.7 PG
MCHC RBC AUTO-ENTMCNC: 31.5 G/DL
MCV RBC AUTO: 88 FL
MONOCYTES # BLD AUTO: 1.1 K/UL
MONOCYTES NFR BLD: 7.8 %
NEUTROPHILS # BLD AUTO: 7.3 K/UL
NEUTROPHILS NFR BLD: 52 %
NRBC BLD-RTO: 0 /100 WBC
PLATELET # BLD AUTO: 198 K/UL
PMV BLD AUTO: 13 FL
POTASSIUM SERPL-SCNC: 4.1 MMOL/L
PROT SERPL-MCNC: 7.2 G/DL
RBC # BLD AUTO: 4.11 M/UL
SODIUM SERPL-SCNC: 140 MMOL/L
TACROLIMUS BLD-MCNC: 4.2 NG/ML
WBC # BLD AUTO: 14.03 K/UL

## 2017-10-16 PROCEDURE — 99213 OFFICE O/P EST LOW 20 MIN: CPT | Mod: PBBFAC | Performed by: INTERNAL MEDICINE

## 2017-10-16 PROCEDURE — 36415 COLL VENOUS BLD VENIPUNCTURE: CPT

## 2017-10-16 PROCEDURE — 99214 OFFICE O/P EST MOD 30 MIN: CPT | Mod: S$PBB,,, | Performed by: INTERNAL MEDICINE

## 2017-10-16 PROCEDURE — 85025 COMPLETE CBC W/AUTO DIFF WBC: CPT

## 2017-10-16 PROCEDURE — 80197 ASSAY OF TACROLIMUS: CPT

## 2017-10-16 PROCEDURE — 99999 PR PBB SHADOW E&M-EST. PATIENT-LVL III: CPT | Mod: PBBFAC,,, | Performed by: INTERNAL MEDICINE

## 2017-10-16 PROCEDURE — 80053 COMPREHEN METABOLIC PANEL: CPT

## 2017-10-16 RX ORDER — COLCHICINE 0.6 MG/1
0.6 TABLET ORAL 2 TIMES DAILY
Qty: 30 TABLET | Refills: 2 | Status: SHIPPED | OUTPATIENT
Start: 2017-10-16 | End: 2021-10-11

## 2017-10-16 RX ORDER — ALLOPURINOL 100 MG/1
100 TABLET ORAL 2 TIMES DAILY
Qty: 60 TABLET | Refills: 11 | Status: SHIPPED | OUTPATIENT
Start: 2017-10-16 | End: 2018-10-28 | Stop reason: SDUPTHER

## 2017-10-16 NOTE — PROGRESS NOTES
Subjective:       Patient ID: Vick Gonzalez is a 61 y.o. male.    Chief Complaint: Liver Transplant Follow-up    HPI  I saw Vick Gonzalez today in the Post-Liver Transplant Clinic. This 61-year-old  gentleman is now 7 years post liver transplant for end-stage liver disease due to hepatitis C. He has been doing well post transplant. He feels well with no symptoms of advanced chronic liver   Disease.    His last liver bx in Dec 2013 showed stage 1 fibrosis only.  Fibroscan unsuccessful but fibrosure showed F4.    He is maintained on tacrolimus 2/1 mg daily..    Treated for HCV- ledip/sof and ribavirin but relapsed.  Currently on Epclusa + riba for about 17 weeks- planning for 24 weeks.    LFTs- normal  Creatinine 1.4    Gained weight and BP control less than ideal-  Managed by PCP.  Hasn't taken his BP meds today yet.      Lab Results   Component Value Date    ALT 25 10/02/2017    AST 24 10/02/2017     (H) 04/24/2017    ALKPHOS 78 10/02/2017    BILITOT 0.7 10/02/2017       Review of Systems   Constitutional: Negative for activity change, appetite change, fatigue, fever and unexpected weight change.   HENT: Negative.  Negative for ear pain, hearing loss, sinus pressure and tinnitus.    Eyes: Negative.  Negative for photophobia, discharge, itching and visual disturbance.   Respiratory: Negative.  Negative for cough, chest tightness and shortness of breath.    Cardiovascular: Negative for chest pain, palpitations and leg swelling.   Gastrointestinal: Negative for constipation, diarrhea, nausea and vomiting.   Genitourinary: Negative for decreased urine volume, difficulty urinating, dysuria, frequency (nocturia x 2) and urgency.   Musculoskeletal: Negative.  Negative for arthralgias, back pain, gait problem, joint swelling and myalgias.   Skin: Negative.    Neurological: Negative for dizziness, seizures, numbness and headaches.   Hematological: Negative for adenopathy. Does not bruise/bleed  easily.   Psychiatric/Behavioral: Negative for dysphoric mood and sleep disturbance. The patient is not nervous/anxious.          Objective:      Physical Exam   Constitutional: He is oriented to person, place, and time. He appears well-developed and well-nourished.   HENT:   Head: Normocephalic and atraumatic.   Eyes: Conjunctivae are normal. Pupils are equal, round, and reactive to light.   Neck: Normal range of motion. No thyromegaly present.   Cardiovascular: Normal rate, regular rhythm and normal heart sounds.    No murmur heard.  Pulmonary/Chest: Effort normal and breath sounds normal. He has no wheezes.   Abdominal: Soft. Bowel sounds are normal. He exhibits no distension and no mass. There is no tenderness.   Musculoskeletal: He exhibits no edema.   Lymphadenopathy:     He has no cervical adenopathy.   Neurological: He is alert and oriented to person, place, and time.   Skin: Skin is warm. No rash noted. No erythema.   Psychiatric: He has a normal mood and affect. His behavior is normal.       Assessment:       1. Chronic hepatitis C without hepatic coma    2. Status post liver transplant    3. Immunosuppression        Plan:   No immunosuppressive changes- continue tac 2/1 mg    - continue on HCV treatment    Clinic in 6 months.

## 2017-10-16 NOTE — LETTER
October 16, 2017        Emanuel Lopez  [address]  [address]  [place] [address]  Phone: [phone]  Fax: [phone]             [facility] Trans[facility]  [address]  [facility] [address]  Phone: [phone]   Patient: Vick Gonzalez   MR Number: [id number]   YOB: 1955   Date of Visit: 10/16/2017       Dear Dr. Emanuel Lopez    Thank you for referring Vick Gonzalez to me for evaluation. Attached you will find relevant portions of my assessment and plan of care.    If you have questions, please do not hesitate to call me. I look forward to following Vick Gonzalez along with you.    Sincerely,    James Coleman MD    Enclosure    If you would like to receive this communication electronically, please contact [email or web address] or [phone] to request [employer] access.    [employer] is a tool which provides read-only access to select patient information with whom you have a relationship. Its easy to use and provides real time access to review your patients record including encounter summaries, notes, results, and demographic information.    If you feel you have received this communication in error or would no longer like to receive these types of communications, please e-mail [email or web address]

## 2017-10-16 NOTE — TELEPHONE ENCOUNTER
Has he been taking this medication?  If he was not taking this medication, is not advised that he restart during the gout flare because it will make it significantly worse.  If he is already taking this medication and has not been out and just needs a refill that and I can refill it.    I will give him a prescription of colchicine to take acutely for a flareup.  He can take this twice a day for a few days until his gout flare up improves

## 2017-10-16 NOTE — TELEPHONE ENCOUNTER
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 Anemia level is stable. Prograf in good range    - Continue Epclusa - 1 pill daily - don't miss any doses.  - CONTINUE Ribavirin 800mg daily - FOUR 200mg pills each day (can be all at once or  2 in AM and 2 in PM)  - Continue prograf Prograf 2mg AM and 1mg in PM    Next labs due -   -  CBC, CMP, prograf - wk 20 - 11/6  -  CBC, CMP, Prograf, HCV RNA - wk 24 - end of Rx - 12/4

## 2017-10-17 ENCOUNTER — EPISODE CHANGES (OUTPATIENT)
Dept: HEPATOLOGY | Facility: CLINIC | Age: 62
End: 2017-10-17

## 2017-10-19 ENCOUNTER — OUTPATIENT CASE MANAGEMENT (OUTPATIENT)
Dept: ADMINISTRATIVE | Facility: OTHER | Age: 62
End: 2017-10-19

## 2017-10-19 NOTE — PROGRESS NOTES
The following patient has been assigned to Edith Rios RN with Outpatient Complex Care Management for high risk screening.    Reason: High Risk    Please contact OPCM at [phone] with any questions.    Thank you,  Paula Duggan

## 2017-10-20 ENCOUNTER — OUTPATIENT CASE MANAGEMENT (OUTPATIENT)
Dept: ADMINISTRATIVE | Facility: OTHER | Age: 62
End: 2017-10-20

## 2017-10-20 NOTE — PROGRESS NOTES
10-20-17--1st Attempt to complete initial assessment for Outpatient Care Management; l spoke with patients spouse-- she reports that she feels patient does not need OPCM services.  reports that patient had a follow-up appointment with  since ER visit and patient was started on Gout medicine.  I will mail my contact information should any new problems/concerns arise in the future.Dhaval JIMENEZ CCM

## 2017-10-24 ENCOUNTER — TELEPHONE (OUTPATIENT)
Dept: PHARMACY | Facility: CLINIC | Age: 62
End: 2017-10-24

## 2017-10-24 DIAGNOSIS — B18.2 CHRONIC HEPATITIS C WITHOUT HEPATIC COMA: Primary | ICD-10-CM

## 2017-10-24 NOTE — TELEPHONE ENCOUNTER
Refill readiness for Epclusa + RBV confirmed with patient; name/ confirmed; no missed doses;       patient wife stated that patient has recently been placed on allopurinol 100mg bid and colchicine 0.6mg bid- Note that a DDI may exist with epclusa and colchicine -  downward adjustment of colchicine to 0.3mg per day if possible may be indicated - monitor;     no side effects noted; address confirmed for 10/25 shipment and 10/26 delivery.    ANUEL Freeman.Ph.  Clinical Pharmacist  [facility]  Phone: [phone]

## 2017-10-26 NOTE — NURSING
Called and spoke with pt wife, Mrs. Ta. Verbalized full understanding to all pre-op instructions including no insulin the day of and half dose levemir the night before. All questions answered. Arrival time 730am.

## 2017-10-27 NOTE — TELEPHONE ENCOUNTER
Chart reviewed    pls call pt  Since pt is now on colchicine for gout at the same time that he's taking epclusa for HCV, I have to monitor the kidney numbers a little closer    Schedule CMP next week please (week of 10/30)    thanks

## 2017-10-30 ENCOUNTER — LAB VISIT (OUTPATIENT)
Dept: LAB | Facility: HOSPITAL | Age: 62
End: 2017-10-30
Attending: FAMILY MEDICINE
Payer: MEDICARE

## 2017-10-30 DIAGNOSIS — B18.2 CHRONIC HEPATITIS C WITHOUT HEPATIC COMA: ICD-10-CM

## 2017-10-30 LAB
ALBUMIN SERPL BCP-MCNC: 3.3 G/DL
ALP SERPL-CCNC: 83 U/L
ALT SERPL W/O P-5'-P-CCNC: 18 U/L
ANION GAP SERPL CALC-SCNC: 8 MMOL/L
AST SERPL-CCNC: 17 U/L
BILIRUB SERPL-MCNC: 0.5 MG/DL
BUN SERPL-MCNC: 14 MG/DL
CALCIUM SERPL-MCNC: 9.2 MG/DL
CHLORIDE SERPL-SCNC: 107 MMOL/L
CO2 SERPL-SCNC: 23 MMOL/L
CREAT SERPL-MCNC: 1.2 MG/DL
EST. GFR  (AFRICAN AMERICAN): >60 ML/MIN/1.73 M^2
EST. GFR  (NON AFRICAN AMERICAN): >60 ML/MIN/1.73 M^2
GLUCOSE SERPL-MCNC: 143 MG/DL
POTASSIUM SERPL-SCNC: 4.3 MMOL/L
PROT SERPL-MCNC: 7.4 G/DL
SODIUM SERPL-SCNC: 138 MMOL/L

## 2017-10-30 PROCEDURE — 80053 COMPREHEN METABOLIC PANEL: CPT

## 2017-10-30 PROCEDURE — 36415 COLL VENOUS BLD VENIPUNCTURE: CPT | Mod: PO

## 2017-10-31 ENCOUNTER — TELEPHONE (OUTPATIENT)
Dept: HEPATOLOGY | Facility: CLINIC | Age: 62
End: 2017-10-31

## 2017-10-31 NOTE — TELEPHONE ENCOUNTER
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  - CONTINUE Ribavirin 800mg daily - FOUR 200mg pills each day (can be all at once or  2 in AM and 2 in PM)  - Continue prograf Prograf 2mg AM and 1mg in PM    Next labs due -   -  CBC, CMP, prograf - wk 20 - 11/6  -  CBC, CMP, Prograf, HCV RNA - wk 24 - end of Rx - 12/4

## 2017-11-01 ENCOUNTER — HOSPITAL ENCOUNTER (OUTPATIENT)
Facility: HOSPITAL | Age: 62
Discharge: HOME | End: 2017-11-01
Attending: INTERNAL MEDICINE | Admitting: INTERNAL MEDICINE
Payer: MEDICARE

## 2017-11-01 VITALS
SYSTOLIC BLOOD PRESSURE: 178 MMHG | OXYGEN SATURATION: 97 % | WEIGHT: 260 LBS | DIASTOLIC BLOOD PRESSURE: 99 MMHG | RESPIRATION RATE: 12 BRPM | TEMPERATURE: 98 F | HEIGHT: 74 IN | BODY MASS INDEX: 33.37 KG/M2 | HEART RATE: 82 BPM

## 2017-11-01 DIAGNOSIS — I73.9 CLAUDICATION: ICD-10-CM

## 2017-11-01 DIAGNOSIS — I73.9 PAD (PERIPHERAL ARTERY DISEASE): Primary | ICD-10-CM

## 2017-11-01 DIAGNOSIS — G89.4 CHRONIC PAIN SYNDROME: ICD-10-CM

## 2017-11-01 LAB
ANION GAP SERPL CALC-SCNC: 8 MMOL/L
BASOPHILS # BLD AUTO: 0.02 K/UL
BASOPHILS NFR BLD: 0.3 %
BUN SERPL-MCNC: 17 MG/DL
CALCIUM SERPL-MCNC: 9.1 MG/DL
CHLORIDE SERPL-SCNC: 104 MMOL/L
CO2 SERPL-SCNC: 25 MMOL/L
CREAT SERPL-MCNC: 1.2 MG/DL
DIFFERENTIAL METHOD: ABNORMAL
EOSINOPHIL # BLD AUTO: 0.6 K/UL
EOSINOPHIL NFR BLD: 7.8 %
ERYTHROCYTE [DISTWIDTH] IN BLOOD BY AUTOMATED COUNT: 14 %
EST. GFR  (AFRICAN AMERICAN): >60 ML/MIN/1.73 M^2
EST. GFR  (NON AFRICAN AMERICAN): >60 ML/MIN/1.73 M^2
GLUCOSE SERPL-MCNC: 150 MG/DL
HCT VFR BLD AUTO: 36.7 %
HGB BLD-MCNC: 11.5 G/DL
INR PPP: 1
LYMPHOCYTES # BLD AUTO: 2 K/UL
LYMPHOCYTES NFR BLD: 27.7 %
MCH RBC QN AUTO: 27.9 PG
MCHC RBC AUTO-ENTMCNC: 31.3 G/DL
MCV RBC AUTO: 89 FL
MONOCYTES # BLD AUTO: 0.5 K/UL
MONOCYTES NFR BLD: 6.1 %
NEUTROPHILS # BLD AUTO: 4.2 K/UL
NEUTROPHILS NFR BLD: 57.8 %
PLATELET # BLD AUTO: 164 K/UL
PMV BLD AUTO: 12 FL
POTASSIUM SERPL-SCNC: 4 MMOL/L
PROTHROMBIN TIME: 10.9 SEC
RBC # BLD AUTO: 4.12 M/UL
SODIUM SERPL-SCNC: 137 MMOL/L
WBC # BLD AUTO: 7.32 K/UL

## 2017-11-01 PROCEDURE — 80048 BASIC METABOLIC PNL TOTAL CA: CPT

## 2017-11-01 PROCEDURE — 37228 CATH LAB PROCEDURE: CPT | Mod: LT,,, | Performed by: INTERNAL MEDICINE

## 2017-11-01 PROCEDURE — 63600175 PHARM REV CODE 636 W HCPCS

## 2017-11-01 PROCEDURE — 25500020 PHARM REV CODE 255

## 2017-11-01 PROCEDURE — 93005 ELECTROCARDIOGRAM TRACING: CPT

## 2017-11-01 PROCEDURE — 85610 PROTHROMBIN TIME: CPT

## 2017-11-01 PROCEDURE — C1769 GUIDE WIRE: HCPCS

## 2017-11-01 PROCEDURE — 25000003 PHARM REV CODE 250

## 2017-11-01 PROCEDURE — 25000003 PHARM REV CODE 250: Performed by: INTERNAL MEDICINE

## 2017-11-01 PROCEDURE — 37252 INTRVASC US NONCORONARY 1ST: CPT | Mod: ,,, | Performed by: INTERNAL MEDICINE

## 2017-11-01 PROCEDURE — 85025 COMPLETE CBC W/AUTO DIFF WBC: CPT

## 2017-11-01 PROCEDURE — 99152 MOD SED SAME PHYS/QHP 5/>YRS: CPT | Mod: ,,, | Performed by: INTERNAL MEDICINE

## 2017-11-01 RX ORDER — ONDANSETRON 2 MG/ML
4 INJECTION INTRAMUSCULAR; INTRAVENOUS EVERY 12 HOURS PRN
Status: DISCONTINUED | OUTPATIENT
Start: 2017-11-01 | End: 2017-11-01 | Stop reason: HOSPADM

## 2017-11-01 RX ORDER — OXYCODONE HYDROCHLORIDE 5 MG/1
5 TABLET ORAL EVERY 4 HOURS PRN
Status: DISCONTINUED | OUTPATIENT
Start: 2017-11-01 | End: 2017-11-01

## 2017-11-01 RX ORDER — DIPHENHYDRAMINE HCL 50 MG
50 CAPSULE ORAL ONCE
Status: DISCONTINUED | OUTPATIENT
Start: 2017-11-01 | End: 2017-11-01 | Stop reason: HOSPADM

## 2017-11-01 RX ORDER — OXYCODONE HYDROCHLORIDE 5 MG/1
5 TABLET ORAL EVERY 4 HOURS PRN
Status: DISCONTINUED | OUTPATIENT
Start: 2017-11-01 | End: 2017-11-01 | Stop reason: HOSPADM

## 2017-11-01 RX ORDER — SODIUM CHLORIDE 9 MG/ML
3 INJECTION, SOLUTION INTRAVENOUS CONTINUOUS
Status: ACTIVE | OUTPATIENT
Start: 2017-11-01 | End: 2017-11-01

## 2017-11-01 RX ORDER — SODIUM CHLORIDE 9 MG/ML
1.5 INJECTION, SOLUTION INTRAVENOUS CONTINUOUS
Status: DISCONTINUED | OUTPATIENT
Start: 2017-11-01 | End: 2017-11-01 | Stop reason: HOSPADM

## 2017-11-01 RX ORDER — ACETAMINOPHEN 325 MG/1
650 TABLET ORAL EVERY 4 HOURS PRN
Status: DISCONTINUED | OUTPATIENT
Start: 2017-11-01 | End: 2017-11-01 | Stop reason: HOSPADM

## 2017-11-01 RX ADMIN — SODIUM CHLORIDE 1.5 ML/KG/HR: 0.9 INJECTION, SOLUTION INTRAVENOUS at 01:11

## 2017-11-01 RX ADMIN — OXYCODONE HYDROCHLORIDE 5 MG: 5 TABLET ORAL at 02:11

## 2017-11-01 RX ADMIN — SODIUM CHLORIDE 3 ML/KG/HR: 0.9 INJECTION, SOLUTION INTRAVENOUS at 08:11

## 2017-11-01 NOTE — PROGRESS NOTES
"Post Procedure Note: Peripheral PTA     The pt was brought to the cath lab and under sterile technique, RCFA access was obtained without difficulty under US guidance. Images were obtained in multiple views and successful PTA performed to proximal LPTA and entire BRANDAN with excellent results. Please see full report for details. The pt tolerated the procedure well without complications. Manual pressure held with successful hemostasis.     Vitals:    11/01/17 0803 11/01/17 0924   BP: 129/71 (!) 148/92   BP Location: Right arm Right arm   Patient Position: Lying Lying   Pulse: 71 69   Resp: (!) 21 14   Temp: 98.3 °F (36.8 °C)    TempSrc: Oral    SpO2: 99% 100%   Weight: 117.9 kg (260 lb)    Height: 6' 2" (1.88 m)          Gen: NAD  Ext: 2+ fem/DP/PT pulses, no evidence of hematoma    Plan:  -Post cath care per protocol  -Walking program  "

## 2017-11-01 NOTE — H&P
Subjective:   Patient ID:  Vick Gonzalez is a 61 y.o. male who presents for evaluation of No chief complaint on file.        HPI: 60 y/o AA male who was seen before by Dr. Lezama is here for f/u of leg pain that have been worsening. Pain is worsened with walking. Pain occurs at rest but no ulcers. He has uncontrolled DM diagnosed 6 years ago. He does not smoke. He is unable to walk > 200 yards secondary to pain located in foot and calves. Pain is sharp/aching in quality. He had US of legs done in 2014 that showed significant disease. NORMA was inconclusive and it was thought pain may be secondary to immuno-suppressive medications. BP is controlled.          Past Medical History:   Diagnosis Date    Anemia      Anxiety      Chronic hepatitis C virus infection - RELAPSE following harvoni + RBV 8/23/2011     Completed 12 weeks Harvoni + RBV w/ RELAPSE 6 weeks out (3/2016)  Wk 1 Hgb 12.4, prograf 6.8 Wk 2 Hgb 12.7 Wk 4 Hgb 12.8, prograf 5.4; HCV RNA 47. INCREASE RBV to 600 Wk 5 Hgb 12.6 Wk 6 Hgb 12.4, HCV RNA <12, detected. INCREASE  Wk 7 Hgb 11.8 Wk 8 Hgb 11.7, prograf 4.0. INCREASE RBV 1000, INCREASE PROGRAF 2/1 Wk 9 Hgb 12.4, prograf 3.6  HCV RNA <12, detected. INCREASE PROGRAF 2/2 Wk 10 hgb     Chronic pain syndrome 7/13/2011    CKD (chronic kidney disease), stage III      Diabetes mellitus type II, uncontrolled      Discitis of lumbosacral region 1/16/2015    ED (erectile dysfunction)      Genital herpes      Gout, arthritis      History of alcohol abuse      History of positive PPD, treatment status unknown       Pulmonary granulomas, negative sputum cultures for AFB and indeterminate quantferon test    History of substance abuse      Hypertension      Hypothyroidism      Peptic ulcer disease                 Past Surgical History:   Procedure Laterality Date    CHOLECYSTECTOMY        LIVER TRANSPLANT   06/2010    SPINE SURGERY                    Social History   Substance Use Topics     Smoking status: Former Smoker    Smokeless tobacco: Never Used    Alcohol use No         Comment: over 5 years ago, none currently                Family History   Problem Relation Age of Onset    Cancer Mother      Diabetes Mother      Heart disease Mother      Diabetes Sister      Cancer Maternal Uncle 82       colon CA    Melanoma Neg Hx      Psoriasis Neg Hx      Lupus Neg Hx      Eczema Neg Hx      Acne Neg Hx           Patient's Medications   New Prescriptions     No medications on file   Previous Medications     ALLOPURINOL (ZYLOPRIM) 100 MG TABLET    Take 1 tablet (100 mg total) by mouth 2 (two) times daily.     AMITRIPTYLINE (ELAVIL) 25 MG TABLET    Take 1 tablet (25 mg total) by mouth every evening. For nerve pain and sleep     BLOOD SUGAR DIAGNOSTIC STRP    1 strip by Misc.(Non-Drug; Combo Route) route 4 (four) times daily before meals and nightly.     BLOOD-GLUCOSE METER MISC    1 each by Misc.(Non-Drug; Combo Route) route 4 (four) times daily before meals and nightly.     CALCIUM CARBONATE-VITAMIN D3 (CALTRATE 600 + D) 600 MG(1,500MG) -400 UNIT CHEW         EPCLUSA 400-100 MG TAB    Take 1 tablet by mouth once daily.     FAMOTIDINE (PEPCID) 40 MG TABLET    Take 1 tablet (40 mg total) by mouth once daily.     GABAPENTIN (NEURONTIN) 600 MG TABLET    Take 1 tablet (600 mg total) by mouth 4 (four) times daily.     INSULIN ASPART (NOVOLOG) 100 UNIT/ML INPN PEN    Inject 12 Units into the skin 3 (three) times daily with meals.     INSULIN DETEMIR (LEVEMIR FLEXTOUCH) 100 UNIT/ML (3 ML) SUBQ INPN PEN    Inject 55 Units into the skin every evening.     LANCETS MISC    1 each by Misc.(Non-Drug; Combo Route) route 4 (four) times daily before meals and nightly.     LANCING DEVICE MISC    1 each by Misc.(Non-Drug; Combo Route) route 4 (four) times daily before meals and nightly.     LEVOTHYROXINE (SYNTHROID) 88 MCG TABLET    TAKE 1 TABLET (88 MCG TOTAL) BY MOUTH ONCE DAILY.     LISINOPRIL 10 MG TABLET     "Take 1 tablet (10 mg total) by mouth once daily.     METOPROLOL TARTRATE (LOPRESSOR) 50 MG TABLET    TAKE 3 TABLETS BY MOUTH 2 (TWO) TIMES DAILY.     MULTIVITAMIN (THERAGRAN) PER TABLET    Take 1 tablet by mouth.     NIFEDIPINE (ADALAT CC) 60 MG TBSR    Take 1 tablet (60 mg total) by mouth once daily.     PEN NEEDLE, DIABETIC (BD ULTRA-FINE MENDY PEN NEEDLES) 32 GAUGE X 5/32" NDLE    Use with aspart TIDWM and with detemir QHS     RIBAVIRIN (COPEGUS) 200 MG TABLET    Take as directed. Initial dose will be 3 tablets by PO once daily. Will increase as tolerated to 2 tablets PO BID     SERTRALINE (ZOLOFT) 100 MG TABLET    Take 1 tablet (100 mg total) by mouth once daily.     SILDENAFIL (VIAGRA) 100 MG TABLET    Take 1 tablet (100 mg total) by mouth daily as needed for Erectile Dysfunction.     TACROLIMUS (PROGRAF) 1 MG CAP    Take 1 capsule (1 mg total) by mouth every 12 (twelve) hours. Liver Transplant Z94.4   Modified Medications     No medications on file   Discontinued Medications     No medications on file               Review of patient's allergies indicates:   Allergen Reactions    Naproxen         Other reaction(s): problems w/liver/kid    Oxaprozin         Other reaction(s): cause problems w/kid/liver         Review of Systems   Constitution: Negative.   HENT: Negative.    Eyes: Negative.    Respiratory: Negative.    Endocrine: Negative.    Hematologic/Lymphatic: Negative.    Skin: Negative.    Musculoskeletal: Negative.    Gastrointestinal: Negative.    Neurological: Negative.    Psychiatric/Behavioral: Negative.       Objective:   Physical Exam   Constitutional: He is oriented to person, place, and time. He appears well-developed and well-nourished. No distress.   Examination of the digits showed no clubbing or cyanosis   HENT:   Head: Normocephalic and atraumatic.   Eyes: Conjunctivae are normal. Pupils are equal, round, and reactive to light. Right eye exhibits no discharge.   Neck: Normal range of motion. " "Neck supple. No JVD present. No thyromegaly present.   No carotid bruits   Cardiovascular: Normal rate, regular rhythm, S1 normal, S2 normal, normal heart sounds and intact distal pulses.  PMI is not displaced.  Exam reveals no gallop, no friction rub and no opening snap.    No murmur heard.  Pulses:       Dorsalis pedis pulses are 0 on the right side, and 0 on the left side.        Posterior tibial pulses are 0 on the right side, and 1+ on the left side.   Pulmonary/Chest: Effort normal and breath sounds normal. No respiratory distress. He has no wheezes. He has no rales. He exhibits no tenderness.   Abdominal: Soft. Bowel sounds are normal. He exhibits no distension and no mass. There is no tenderness. There is no guarding.   No hepatosplenomegaly   Musculoskeletal: Normal range of motion. He exhibits no edema or tenderness.   Lymphadenopathy:     He has no cervical adenopathy.   Neurological: He is alert and oriented to person, place, and time.   Skin: Skin is warm. No rash noted. He is not diaphoretic. No erythema.   Psychiatric: He has a normal mood and affect.   Nursing note and vitals reviewed.     Vitals:    11/01/17 0803 11/01/17 0924   BP: 129/71 (!) 148/92   BP Location: Right arm Right arm   Patient Position: Lying Lying   Pulse: 71 69   Resp: (!) 21 14   Temp: 98.3 °F (36.8 °C)    TempSrc: Oral    SpO2: 99% 100%   Weight: 117.9 kg (260 lb)    Height: 6' 2" (1.88 m)      Recent Labs      11/01/17   0745   HGB  11.5*   HCT  36.7*   BUN  17   CREATININE  1.2   ]        ECGs reviewed-NSR  LABS reviewed        Assessment:      1. Essential hypertension    2. Ischemic leg    3. PAD (peripheral artery disease)    4. CKD (chronic kidney disease), stage III    5. Hypertriglyceridemia          Plan:      Staged intervention of LLE  "

## 2017-11-01 NOTE — DISCHARGE SUMMARY
[facility]  Discharge Summary      Admit Date: 11/1/2017    Discharge Date and Time:  11/01/2017 5:15 PM    Attending Physician: Margarito Mahajan MD    Reason for Admission: Peripheral PTA    Procedures Performed: Procedure(s) (LRB):  PTA-PERIPHERAL (N/A)    Hospital Course (synopsis of major diagnoses, care, treatment, and services provided during the course of the hospital stay): The pt was brought to the cath lab and successful PTA performed to proximal LPTA and entire BRANDAN with excellent results. Please see full report for details. The pt tolerated the procedure well without immediate complications and was discharged home. Follow up in 2 weeks. Arterial doppler at 1, 3, 6, 12 months.        Final Diagnoses:    Principal Problem: PAD   Secondary Diagnoses:   Active Hospital Problems    Diagnosis  POA    PAD (peripheral artery disease) [I73.9]  Yes     Priority: Low      Resolved Hospital Problems    Diagnosis Date Resolved POA   No resolved problems to display.   Claudication  CKD  DM  HTN  HLD    Discharged Condition: good    Disposition: Home or Self Care    Follow Up/Patient Instructions:     Medications:  Reconciled Home Medications:   Discharge Medication List as of 11/1/2017  3:14 PM      CONTINUE these medications which have NOT CHANGED    Details   allopurinol (ZYLOPRIM) 100 MG tablet Take 1 tablet (100 mg total) by mouth 2 (two) times daily., Starting Mon 10/16/2017, Normal      amitriptyline (ELAVIL) 25 MG tablet Take 1 tablet (25 mg total) by mouth every evening. For nerve pain and sleep, Starting 8/31/2016, Until Discontinued, Normal      blood sugar diagnostic Strp 1 strip by Misc.(Non-Drug; Combo Route) route 4 (four) times daily before meals and nightly., Starting 1/25/2017, Until Discontinued, Print      blood-glucose meter Misc 1 each by Misc.(Non-Drug; Combo Route) route 4 (four) times daily before meals and nightly., Starting Fri 10/7/2016, Until Mon 10/16/2017, Normal     "  calcium carbonate-vitamin D3 (CALTRATE 600 + D) 600 mg(1,500mg) -400 unit Chew Starting 6/27/2012, Until Discontinued, Historical Med      colchicine 0.6 mg tablet Take 1 tablet (0.6 mg total) by mouth 2 (two) times daily., Starting Mon 10/16/2017, Normal      EPCLUSA 400-100 mg Tab Take 1 tablet by mouth once daily., Starting Thu 6/8/2017, Normal      famotidine (PEPCID) 40 MG tablet Take 1 tablet (40 mg total) by mouth once daily., Starting 5/12/2017, Until Sat 5/12/18, Normal      gabapentin (NEURONTIN) 600 MG tablet Take 600 mg by mouth 4 (four) times daily., Starting Tue 8/29/2017, Historical Med      insulin aspart (NOVOLOG) 100 unit/mL InPn pen Inject 12 Units into the skin 3 (three) times daily with meals., Starting 3/24/2017, Until Sat 3/24/18, Normal      insulin detemir (LEVEMIR FLEXTOUCH) 100 unit/mL (3 mL) SubQ InPn pen Inject 55 Units into the skin every evening., Starting 2/23/2017, Until Discontinued, Normal      lancets Misc 1 each by Misc.(Non-Drug; Combo Route) route 4 (four) times daily before meals and nightly., Starting 11/8/2016, Until Discontinued, Normal      lancing device Misc 1 each by Misc.(Non-Drug; Combo Route) route 4 (four) times daily before meals and nightly., Starting Fri 10/7/2016, Until Mon 10/16/2017, Normal      levothyroxine (SYNTHROID) 88 MCG tablet TAKE 1 TABLET (88 MCG TOTAL) BY MOUTH ONCE DAILY., Normal      lisinopril 10 MG tablet TAKE 1 TABLET (10 MG TOTAL) BY MOUTH ONCE DAILY., Starting Thu 9/7/2017, Normal      metoprolol tartrate (LOPRESSOR) 50 MG tablet TAKE 3 TABLETS BY MOUTH 2 (TWO) TIMES DAILY., Normal      multivitamin (THERAGRAN) per tablet Take 1 tablet by mouth., Starting 6/27/2012, Until Discontinued, Historical Med      nifedipine (ADALAT CC) 60 MG TbSR Take 1 tablet (60 mg total) by mouth once daily., Starting 2/23/2017, Until Discontinued, Normal      pen needle, diabetic (BD ULTRA-FINE MENDY PEN NEEDLES) 32 gauge x 5/32" Ndle Use with aspart TIDWM and " with detemir QHS, Normal      ribavirin (COPEGUS) 200 MG tablet Take as directed. Initial dose will be 3 tablets by PO once daily. Will increase as tolerated to 2 tablets PO BID, Normal      sertraline (ZOLOFT) 100 MG tablet Take 1 tablet (100 mg total) by mouth once daily., Starting 8/2/2016, Until Discontinued, Normal      sildenafil (REVATIO) 20 mg Tab Take 5 po 1 hour before sexual activity, Print      tacrolimus (PROGRAF) 1 MG Cap Take 2mg in AM and 1mg in PM, PO. Liver Transplant Z94.4, Normal           No discharge procedures on file.

## 2017-11-01 NOTE — NURSING
Patient arrived to recovery cath lab.  Pt drowsy but able to follow commands.  VSS.  R groin  site is CDI, site soft, no bleeding or hematoma noted.  Fall risk precautions given and patients acknowledges.  Will continue to monitor

## 2017-11-01 NOTE — NURSING
Pt walked around unit per MD order.  Right  Groin sight assessed no hematoma or bleeding noted.  Gauze dressing CDI.  Pt d/c per MD order.  PIV taken out.  Tip in tact.  VSS. Discharge instructions given to patient.  Verbalized full instructions. All questions answered.    Pt d/c home via wheelchair to private vehicle.

## 2017-11-06 ENCOUNTER — LAB VISIT (OUTPATIENT)
Dept: LAB | Facility: HOSPITAL | Age: 62
End: 2017-11-06
Attending: FAMILY MEDICINE
Payer: MEDICARE

## 2017-11-06 ENCOUNTER — EPISODE CHANGES (OUTPATIENT)
Dept: HEPATOLOGY | Facility: CLINIC | Age: 62
End: 2017-11-06

## 2017-11-06 DIAGNOSIS — Z94.4 HISTORY OF LIVER TRANSPLANT: ICD-10-CM

## 2017-11-06 DIAGNOSIS — Z79.60 LONG-TERM USE OF IMMUNOSUPPRESSANT MEDICATION: ICD-10-CM

## 2017-11-06 DIAGNOSIS — B18.2 CHRONIC HEPATITIS C WITHOUT HEPATIC COMA: ICD-10-CM

## 2017-11-06 LAB
ALBUMIN SERPL BCP-MCNC: 3.2 G/DL
ALP SERPL-CCNC: 85 U/L
ALT SERPL W/O P-5'-P-CCNC: 22 U/L
ANION GAP SERPL CALC-SCNC: 9 MMOL/L
AST SERPL-CCNC: 21 U/L
BASOPHILS # BLD AUTO: 0.03 K/UL
BASOPHILS NFR BLD: 0.4 %
BILIRUB SERPL-MCNC: 0.5 MG/DL
BUN SERPL-MCNC: 16 MG/DL
CALCIUM SERPL-MCNC: 9.1 MG/DL
CHLORIDE SERPL-SCNC: 107 MMOL/L
CO2 SERPL-SCNC: 23 MMOL/L
CREAT SERPL-MCNC: 1.2 MG/DL
DIFFERENTIAL METHOD: ABNORMAL
EOSINOPHIL # BLD AUTO: 0.6 K/UL
EOSINOPHIL NFR BLD: 8 %
ERYTHROCYTE [DISTWIDTH] IN BLOOD BY AUTOMATED COUNT: 14 %
EST. GFR  (AFRICAN AMERICAN): >60 ML/MIN/1.73 M^2
EST. GFR  (NON AFRICAN AMERICAN): >60 ML/MIN/1.73 M^2
GLUCOSE SERPL-MCNC: 161 MG/DL
HCT VFR BLD AUTO: 39.1 %
HGB BLD-MCNC: 11.9 G/DL
IMM GRANULOCYTES # BLD AUTO: 0.02 K/UL
IMM GRANULOCYTES NFR BLD AUTO: 0.3 %
LYMPHOCYTES # BLD AUTO: 2.1 K/UL
LYMPHOCYTES NFR BLD: 29.8 %
MCH RBC QN AUTO: 27.7 PG
MCHC RBC AUTO-ENTMCNC: 30.4 G/DL
MCV RBC AUTO: 91 FL
MONOCYTES # BLD AUTO: 0.8 K/UL
MONOCYTES NFR BLD: 11 %
NEUTROPHILS # BLD AUTO: 3.6 K/UL
NEUTROPHILS NFR BLD: 50.5 %
NRBC BLD-RTO: 0 /100 WBC
PLATELET # BLD AUTO: 150 K/UL
PMV BLD AUTO: 13.2 FL
POTASSIUM SERPL-SCNC: 3.9 MMOL/L
PROT SERPL-MCNC: 7.2 G/DL
RBC # BLD AUTO: 4.3 M/UL
SODIUM SERPL-SCNC: 139 MMOL/L
WBC # BLD AUTO: 7.02 K/UL

## 2017-11-06 PROCEDURE — 36415 COLL VENOUS BLD VENIPUNCTURE: CPT | Mod: PO

## 2017-11-06 PROCEDURE — 80197 ASSAY OF TACROLIMUS: CPT

## 2017-11-06 PROCEDURE — 85025 COMPLETE CBC W/AUTO DIFF WBC: CPT

## 2017-11-06 PROCEDURE — 80053 COMPREHEN METABOLIC PANEL: CPT

## 2017-11-07 LAB — TACROLIMUS BLD-MCNC: 5.9 NG/ML

## 2017-11-08 ENCOUNTER — TELEPHONE (OUTPATIENT)
Dept: HEPATOLOGY | Facility: CLINIC | Age: 62
End: 2017-11-08

## 2017-11-08 DIAGNOSIS — B18.2 CHRONIC HEPATITIS C WITHOUT HEPATIC COMA: ICD-10-CM

## 2017-11-08 NOTE — TELEPHONE ENCOUNTER
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  - CONTINUE Ribavirin 800mg daily - FOUR 200mg pills each day (can be all at once or  2 in AM and 2 in PM)  - Continue prograf Prograf 2mg AM and 1mg in PM    Next labs due -   -  CBC, CMP, Prograf, HCV RNA - wk 24 - end of Rx - 12/4

## 2017-11-14 LAB
POC ACTIVATED CLOTTING TIME K: 213 SEC (ref 74–137)
POC ACTIVATED CLOTTING TIME K: 213 SEC (ref 74–137)
SAMPLE: ABNORMAL
SAMPLE: ABNORMAL

## 2017-11-27 RX ORDER — AMITRIPTYLINE HYDROCHLORIDE 25 MG/1
25 TABLET, FILM COATED ORAL NIGHTLY
Qty: 30 TABLET | Refills: 5 | Status: SHIPPED | OUTPATIENT
Start: 2017-11-27 | End: 2018-08-03 | Stop reason: SDUPTHER

## 2017-11-30 ENCOUNTER — OFFICE VISIT (OUTPATIENT)
Dept: PAIN MEDICINE | Facility: CLINIC | Age: 62
End: 2017-11-30
Payer: MEDICARE

## 2017-11-30 VITALS
SYSTOLIC BLOOD PRESSURE: 165 MMHG | HEIGHT: 74 IN | HEART RATE: 90 BPM | BODY MASS INDEX: 34.8 KG/M2 | TEMPERATURE: 99 F | RESPIRATION RATE: 16 BRPM | WEIGHT: 271.19 LBS | DIASTOLIC BLOOD PRESSURE: 90 MMHG

## 2017-11-30 DIAGNOSIS — F11.90 CHRONIC, CONTINUOUS USE OF OPIOIDS: ICD-10-CM

## 2017-11-30 DIAGNOSIS — M43.10 ACQUIRED SPONDYLOLISTHESIS: ICD-10-CM

## 2017-11-30 DIAGNOSIS — G89.4 CHRONIC PAIN SYNDROME: ICD-10-CM

## 2017-11-30 DIAGNOSIS — M96.1 POSTLAMINECTOMY SYNDROME OF LUMBAR REGION: Primary | ICD-10-CM

## 2017-11-30 DIAGNOSIS — Z79.891 ENCOUNTER FOR MONITORING OPIOID MAINTENANCE THERAPY: ICD-10-CM

## 2017-11-30 DIAGNOSIS — Z51.81 ENCOUNTER FOR MONITORING OPIOID MAINTENANCE THERAPY: ICD-10-CM

## 2017-11-30 DIAGNOSIS — M54.16 LUMBAR RADICULOPATHY: ICD-10-CM

## 2017-11-30 PROCEDURE — 99213 OFFICE O/P EST LOW 20 MIN: CPT | Mod: PBBFAC | Performed by: NURSE PRACTITIONER

## 2017-11-30 PROCEDURE — 80307 DRUG TEST PRSMV CHEM ANLYZR: CPT

## 2017-11-30 PROCEDURE — 99214 OFFICE O/P EST MOD 30 MIN: CPT | Mod: S$PBB,,, | Performed by: NURSE PRACTITIONER

## 2017-11-30 PROCEDURE — 99999 PR PBB SHADOW E&M-EST. PATIENT-LVL III: CPT | Mod: PBBFAC,,, | Performed by: NURSE PRACTITIONER

## 2017-11-30 RX ORDER — GABAPENTIN 800 MG/1
800 TABLET ORAL 3 TIMES DAILY
Qty: 90 TABLET | Refills: 11 | Status: SHIPPED | OUTPATIENT
Start: 2017-11-30 | End: 2018-11-13 | Stop reason: SDUPTHER

## 2017-11-30 RX ORDER — OXYCODONE HYDROCHLORIDE 15 MG/1
15 TABLET ORAL 4 TIMES DAILY PRN
Qty: 120 TABLET | Refills: 0 | Status: SHIPPED | OUTPATIENT
Start: 2018-01-26 | End: 2018-02-25

## 2017-11-30 RX ORDER — OXYCODONE HYDROCHLORIDE 15 MG/1
15 TABLET ORAL 4 TIMES DAILY PRN
Qty: 120 TABLET | Refills: 0 | Status: SHIPPED | OUTPATIENT
Start: 2017-12-29 | End: 2018-01-28

## 2017-11-30 RX ORDER — OXYCODONE HYDROCHLORIDE 15 MG/1
15 TABLET ORAL 4 TIMES DAILY PRN
Qty: 120 TABLET | Refills: 0 | Status: SHIPPED | OUTPATIENT
Start: 2017-11-30 | End: 2017-12-30

## 2017-12-05 ENCOUNTER — LAB VISIT (OUTPATIENT)
Dept: LAB | Facility: HOSPITAL | Age: 62
End: 2017-12-05
Attending: PHYSICIAN ASSISTANT
Payer: MEDICARE

## 2017-12-05 DIAGNOSIS — Z94.4 HISTORY OF LIVER TRANSPLANT: ICD-10-CM

## 2017-12-05 DIAGNOSIS — B18.2 CHRONIC HEPATITIS C WITHOUT HEPATIC COMA: ICD-10-CM

## 2017-12-05 DIAGNOSIS — Z79.60 LONG-TERM USE OF IMMUNOSUPPRESSANT MEDICATION: ICD-10-CM

## 2017-12-05 LAB
ALBUMIN SERPL BCP-MCNC: 3.3 G/DL
ALP SERPL-CCNC: 108 U/L
ALT SERPL W/O P-5'-P-CCNC: 27 U/L
ANION GAP SERPL CALC-SCNC: 9 MMOL/L
AST SERPL-CCNC: 30 U/L
BASOPHILS # BLD AUTO: 0.04 K/UL
BASOPHILS NFR BLD: 0.4 %
BILIRUB SERPL-MCNC: 0.6 MG/DL
BUN SERPL-MCNC: 14 MG/DL
CALCIUM SERPL-MCNC: 8.7 MG/DL
CHLORIDE SERPL-SCNC: 109 MMOL/L
CO2 SERPL-SCNC: 23 MMOL/L
CREAT SERPL-MCNC: 1.2 MG/DL
DIFFERENTIAL METHOD: ABNORMAL
EOSINOPHIL # BLD AUTO: 0.5 K/UL
EOSINOPHIL NFR BLD: 4.8 %
ERYTHROCYTE [DISTWIDTH] IN BLOOD BY AUTOMATED COUNT: 14.2 %
EST. GFR  (AFRICAN AMERICAN): >60 ML/MIN/1.73 M^2
EST. GFR  (NON AFRICAN AMERICAN): >60 ML/MIN/1.73 M^2
GLUCOSE SERPL-MCNC: 137 MG/DL
HCT VFR BLD AUTO: 37.8 %
HGB BLD-MCNC: 11.1 G/DL
IMM GRANULOCYTES # BLD AUTO: 0.01 K/UL
IMM GRANULOCYTES NFR BLD AUTO: 0.1 %
LYMPHOCYTES # BLD AUTO: 2.5 K/UL
LYMPHOCYTES NFR BLD: 26.3 %
MCH RBC QN AUTO: 28 PG
MCHC RBC AUTO-ENTMCNC: 29.4 G/DL
MCV RBC AUTO: 96 FL
MONOCYTES # BLD AUTO: 0.8 K/UL
MONOCYTES NFR BLD: 8.7 %
NEUTROPHILS # BLD AUTO: 5.6 K/UL
NEUTROPHILS NFR BLD: 59.7 %
NRBC BLD-RTO: 0 /100 WBC
PLATELET # BLD AUTO: 133 K/UL
PMV BLD AUTO: 13.6 FL
POTASSIUM SERPL-SCNC: 3.7 MMOL/L
PROT SERPL-MCNC: 7 G/DL
RBC # BLD AUTO: 3.96 M/UL
SODIUM SERPL-SCNC: 141 MMOL/L
WBC # BLD AUTO: 9.32 K/UL

## 2017-12-05 PROCEDURE — 85025 COMPLETE CBC W/AUTO DIFF WBC: CPT

## 2017-12-05 PROCEDURE — 80053 COMPREHEN METABOLIC PANEL: CPT

## 2017-12-05 PROCEDURE — 87522 HEPATITIS C REVRS TRNSCRPJ: CPT

## 2017-12-05 PROCEDURE — 80197 ASSAY OF TACROLIMUS: CPT

## 2017-12-06 LAB
HCV LOG: <1.08 LOG (10) IU/ML
HCV RNA QUANT PCR: <12 IU/ML
HCV, QUALITATIVE: NOT DETECTED IU/ML
TACROLIMUS BLD-MCNC: 5 NG/ML

## 2017-12-07 ENCOUNTER — TELEPHONE (OUTPATIENT)
Dept: HEPATOLOGY | Facility: CLINIC | Age: 62
End: 2017-12-07

## 2017-12-07 ENCOUNTER — TELEPHONE (OUTPATIENT)
Dept: TRANSPLANT | Facility: CLINIC | Age: 62
End: 2017-12-07

## 2017-12-07 DIAGNOSIS — Z94.4 LIVER TRANSPLANTED: ICD-10-CM

## 2017-12-07 DIAGNOSIS — B18.2 CHRONIC HEPATITIS C WITHOUT HEPATIC COMA: Primary | ICD-10-CM

## 2017-12-07 DIAGNOSIS — Z85.05 PERSONAL HISTORY OF MALIGNANT NEOPLASM OF LIVER: Primary | ICD-10-CM

## 2017-12-07 NOTE — TELEPHONE ENCOUNTER
----- Message from Jennifer B. Scheuermann, PA sent at 12/7/2017  4:05 PM CST -----  End of HCV rx  I will monitor HCV x 24 weeks  All other lab monitoring deferred to transplant clinic

## 2017-12-07 NOTE — TELEPHONE ENCOUNTER
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 HCV neg  - Patient should be finished HCV treatment now.   There is a small chance the virus can return. We will monitor blood for this.  - Continue current prograf dose    Next labs due - pls schedule  - HCV RNA - svr6 - 1/15/18  - HCV RNA - svr12 - 2/26/18

## 2017-12-08 ENCOUNTER — TELEPHONE (OUTPATIENT)
Dept: PAIN MEDICINE | Facility: CLINIC | Age: 62
End: 2017-12-08

## 2017-12-08 NOTE — TELEPHONE ENCOUNTER
----- Message from Rachael Meadows MA sent at 12/7/2017  4:15 PM CST -----  [facility] Staff  Caller: Unspecified (Today,  3:46 PM)         _  1st Request   _  2nd Request   _  3rd Request         Who: [facility]     Why: amador calling to speak with nurse regarding pt.     What Number to Call Back:[phone]     When to Expect a call back: (Before the end of the day)   -- if the call is after 12:00, the call back will be tomorrow.

## 2017-12-11 ENCOUNTER — TELEPHONE (OUTPATIENT)
Dept: PHARMACY | Facility: CLINIC | Age: 62
End: 2017-12-11

## 2017-12-11 NOTE — TELEPHONE ENCOUNTER
Attempt to contact pt however unable to reach LVM for pt to call our office back for these results and to schedule additional labwork.

## 2017-12-12 LAB
6MAM UR QL: NOT DETECTED
7AMINOCLONAZEPAM UR QL: NOT DETECTED
A-OH ALPRAZ UR QL: NOT DETECTED
ALPRAZ UR QL: NOT DETECTED
AMPHET UR QL SCN: NOT DETECTED
ANNOTATION COMMENT IMP: NORMAL
ANNOTATION COMMENT IMP: NORMAL
BARBITURATES UR QL: NOT DETECTED
BUPRENORPHINE UR QL: NOT DETECTED
BZE UR QL: NOT DETECTED
CARBOXYTHC UR QL: NOT DETECTED
CARISOPRODOL UR QL: NOT DETECTED
CLONAZEPAM UR QL: NOT DETECTED
CODEINE UR QL: NOT DETECTED
CREAT UR-MCNC: 189.7 MG/DL (ref 20–400)
DIAZEPAM UR QL: NOT DETECTED
ETHYL GLUCURONIDE UR QL: NOT DETECTED
FENTANYL UR QL: NOT DETECTED
HYDROCODONE UR QL: NOT DETECTED
HYDROMORPHONE UR QL: NOT DETECTED
LORAZEPAM UR QL: NOT DETECTED
MDA UR QL: NOT DETECTED
MDEA UR QL: NOT DETECTED
MDMA UR QL: NOT DETECTED
ME-PHENIDATE UR QL: NOT DETECTED
MEPERIDINE UR QL: NOT DETECTED
METHADONE UR QL: NOT DETECTED
METHAMPHET UR QL: NOT DETECTED
MIDAZOLAM UR QL SCN: NOT DETECTED
MORPHINE UR QL: NOT DETECTED
NORBUPRENORPHINE UR QL CFM: NOT DETECTED
NORDIAZEPAM UR QL: NOT DETECTED
NORFENTANYL UR QL: NOT DETECTED
NORHYDROCODONE UR QL CFM: NOT DETECTED
NOROXYCODONE UR QL CFM: PRESENT
NOROXYMORPHONE: PRESENT
OXAZEPAM UR QL: NOT DETECTED
OXYCODONE UR QL: PRESENT
OXYMORPHONE UR QL: PRESENT
PATHOLOGY STUDY: NORMAL
PCP UR QL: NOT DETECTED
PERIPHERAL STENOSIS: ABNORMAL
PHENTERMINE UR QL: NOT DETECTED
PROPOXYPH UR QL: NOT DETECTED
SERVICE CMNT-IMP: NORMAL
TAPENTADOL UR QL SCN: NOT DETECTED
TAPENTADOL-O-SULF: NOT DETECTED
TEMAZEPAM UR QL: NOT DETECTED
TRAMADOL UR QL: NOT DETECTED
ZOLPIDEM UR QL: NOT DETECTED

## 2017-12-12 RX ORDER — LEVOTHYROXINE SODIUM 88 UG/1
TABLET ORAL
Qty: 30 TABLET | Refills: 11 | Status: SHIPPED | OUTPATIENT
Start: 2017-12-12 | End: 2018-12-24 | Stop reason: SDUPTHER

## 2017-12-19 ENCOUNTER — HOSPITAL ENCOUNTER (OUTPATIENT)
Dept: RADIOLOGY | Facility: HOSPITAL | Age: 62
Discharge: HOME | End: 2017-12-19
Attending: INTERNAL MEDICINE
Payer: MEDICARE

## 2017-12-19 DIAGNOSIS — Z85.05 PERSONAL HISTORY OF MALIGNANT NEOPLASM OF LIVER: ICD-10-CM

## 2017-12-19 DIAGNOSIS — Z94.4 LIVER TRANSPLANTED: ICD-10-CM

## 2017-12-19 PROCEDURE — 74177 CT ABD & PELVIS W/CONTRAST: CPT | Mod: TC

## 2017-12-19 PROCEDURE — 74177 CT ABD & PELVIS W/CONTRAST: CPT | Mod: 26,GC,, | Performed by: RADIOLOGY

## 2017-12-19 PROCEDURE — 71260 CT THORAX DX C+: CPT | Mod: TC

## 2017-12-19 PROCEDURE — 71260 CT THORAX DX C+: CPT | Mod: 26,,, | Performed by: RADIOLOGY

## 2017-12-19 PROCEDURE — 25500020 PHARM REV CODE 255: Performed by: INTERNAL MEDICINE

## 2017-12-19 RX ADMIN — IOHEXOL 100 ML: 350 INJECTION, SOLUTION INTRAVENOUS at 02:12

## 2017-12-20 ENCOUNTER — TELEPHONE (OUTPATIENT)
Dept: TRANSPLANT | Facility: CLINIC | Age: 62
End: 2017-12-20

## 2017-12-20 NOTE — LETTER
December 20, 2017    Vick Gonzalez  [facility] [facility] [address]          Dear Vick Gonzalez:  MRN: [id number]    Your CT scan reports were received and reviewed by Dr. Coleman. No evidence of recurrent HCC found.  Great news!    Please do not hesitate to call me with any questions or concerns.    Sincerely,    Kandy Herrera, RN, BSN  Liver Transplant Coordinator  [facility]  [address] [place] [id number]  [phone]

## 2018-01-01 RX ORDER — SERTRALINE HYDROCHLORIDE 100 MG/1
100 TABLET, FILM COATED ORAL DAILY
Qty: 30 TABLET | Refills: 7 | Status: ON HOLD | OUTPATIENT
Start: 2018-01-01 | End: 2023-05-16

## 2018-01-08 ENCOUNTER — LAB VISIT (OUTPATIENT)
Dept: LAB | Facility: HOSPITAL | Age: 63
End: 2018-01-08
Attending: INTERNAL MEDICINE
Payer: MEDICARE

## 2018-01-08 DIAGNOSIS — Z94.4 LIVER TRANSPLANTED: ICD-10-CM

## 2018-01-08 DIAGNOSIS — Z85.05 PERSONAL HISTORY OF MALIGNANT NEOPLASM OF LIVER: ICD-10-CM

## 2018-01-08 LAB
AFP SERPL-MCNC: 4.5 NG/ML
ALBUMIN SERPL BCP-MCNC: 3.5 G/DL
ALP SERPL-CCNC: 90 U/L
ALT SERPL W/O P-5'-P-CCNC: 32 U/L
ANION GAP SERPL CALC-SCNC: 8 MMOL/L
AST SERPL-CCNC: 30 U/L
BASOPHILS # BLD AUTO: 0.04 K/UL
BASOPHILS NFR BLD: 0.5 %
BILIRUB SERPL-MCNC: 0.4 MG/DL
BUN SERPL-MCNC: 13 MG/DL
CALCIUM SERPL-MCNC: 9.1 MG/DL
CHLORIDE SERPL-SCNC: 104 MMOL/L
CO2 SERPL-SCNC: 27 MMOL/L
CREAT SERPL-MCNC: 1.2 MG/DL
DIFFERENTIAL METHOD: ABNORMAL
EOSINOPHIL # BLD AUTO: 0.5 K/UL
EOSINOPHIL NFR BLD: 6 %
ERYTHROCYTE [DISTWIDTH] IN BLOOD BY AUTOMATED COUNT: 12.4 %
EST. GFR  (AFRICAN AMERICAN): >60 ML/MIN/1.73 M^2
EST. GFR  (NON AFRICAN AMERICAN): >60 ML/MIN/1.73 M^2
GLUCOSE SERPL-MCNC: 148 MG/DL
HCT VFR BLD AUTO: 40.9 %
HGB BLD-MCNC: 13.1 G/DL
IMM GRANULOCYTES # BLD AUTO: 0.01 K/UL
IMM GRANULOCYTES NFR BLD AUTO: 0.1 %
LYMPHOCYTES # BLD AUTO: 2.8 K/UL
LYMPHOCYTES NFR BLD: 33 %
MCH RBC QN AUTO: 27.2 PG
MCHC RBC AUTO-ENTMCNC: 32 G/DL
MCV RBC AUTO: 85 FL
MONOCYTES # BLD AUTO: 0.8 K/UL
MONOCYTES NFR BLD: 9.5 %
NEUTROPHILS # BLD AUTO: 4.3 K/UL
NEUTROPHILS NFR BLD: 50.9 %
NRBC BLD-RTO: 0 /100 WBC
PLATELET # BLD AUTO: 164 K/UL
PMV BLD AUTO: 13 FL
POTASSIUM SERPL-SCNC: 3.7 MMOL/L
PROT SERPL-MCNC: 7.5 G/DL
RBC # BLD AUTO: 4.81 M/UL
SODIUM SERPL-SCNC: 139 MMOL/L
TACROLIMUS BLD-MCNC: 5.3 NG/ML
WBC # BLD AUTO: 8.51 K/UL

## 2018-01-08 PROCEDURE — 82105 ALPHA-FETOPROTEIN SERUM: CPT

## 2018-01-08 PROCEDURE — 36415 COLL VENOUS BLD VENIPUNCTURE: CPT | Mod: PO

## 2018-01-08 PROCEDURE — 85025 COMPLETE CBC W/AUTO DIFF WBC: CPT

## 2018-01-08 PROCEDURE — 80197 ASSAY OF TACROLIMUS: CPT

## 2018-01-08 PROCEDURE — 80053 COMPREHEN METABOLIC PANEL: CPT

## 2018-01-11 ENCOUNTER — OFFICE VISIT (OUTPATIENT)
Dept: OPTOMETRY | Facility: CLINIC | Age: 63
End: 2018-01-11
Payer: MEDICARE

## 2018-01-11 DIAGNOSIS — H52.4 PRESBYOPIA: ICD-10-CM

## 2018-01-11 DIAGNOSIS — E11.9 TYPE 2 DIABETES MELLITUS WITHOUT OPHTHALMIC MANIFESTATIONS: Primary | ICD-10-CM

## 2018-01-11 PROCEDURE — 99212 OFFICE O/P EST SF 10 MIN: CPT | Mod: PBBFAC | Performed by: OPTOMETRIST

## 2018-01-11 PROCEDURE — 92014 COMPRE OPH EXAM EST PT 1/>: CPT | Mod: S$PBB,,, | Performed by: OPTOMETRIST

## 2018-01-11 PROCEDURE — 99999 PR PBB SHADOW E&M-EST. PATIENT-LVL II: CPT | Mod: PBBFAC,,, | Performed by: OPTOMETRIST

## 2018-01-11 PROCEDURE — 92015 DETERMINE REFRACTIVE STATE: CPT | Mod: ,,, | Performed by: OPTOMETRIST

## 2018-01-12 ENCOUNTER — TELEPHONE (OUTPATIENT)
Dept: TRANSPLANT | Facility: CLINIC | Age: 63
End: 2018-01-12

## 2018-01-12 NOTE — TELEPHONE ENCOUNTER
Letter sent, labs stable and no medication changes are needed. Repeat labs due 3/5/18 per protocol.

## 2018-01-12 NOTE — LETTER
January 12, 2018    Vick Gonzalez  [facility] [place] [address]          Dear Vick Gonzalez:  MRN: [id number]    Your lab results were stable.  There are no medicine changes.  Please have your labs drawn again on 3/5/18.      If you cannot have your labs drawn on the scheduled date, it is your responsibility to call the transplant department to reschedule.  To reschedule or make an appointment, please as to speak to or leave a message for my assistant, Portia Lincoln, at [phone].  When leaving a message for Latonia Pearce, or myself, we ask that you leave a brief message regarding your request.    Sincerely,      Kandy Herrera, RN, BSN  Liver Transplant Coordinator  [facility]  [address] [place] [address] [phone]

## 2018-01-15 ENCOUNTER — LAB VISIT (OUTPATIENT)
Dept: LAB | Facility: HOSPITAL | Age: 63
End: 2018-01-15
Attending: PHYSICIAN ASSISTANT
Payer: MEDICARE

## 2018-01-15 DIAGNOSIS — B18.2 CHRONIC HEPATITIS C WITHOUT HEPATIC COMA: ICD-10-CM

## 2018-01-15 PROCEDURE — 36415 COLL VENOUS BLD VENIPUNCTURE: CPT | Mod: PO

## 2018-01-15 PROCEDURE — 87522 HEPATITIS C REVRS TRNSCRPJ: CPT

## 2018-01-18 LAB
HCV LOG: <1.08 LOG (10) IU/ML
HCV RNA QUANT PCR: <12 IU/ML
HCV, QUALITATIVE: NOT DETECTED IU/ML

## 2018-01-19 ENCOUNTER — TELEPHONE (OUTPATIENT)
Dept: HEPATOLOGY | Facility: CLINIC | Age: 63
End: 2018-01-19

## 2018-01-19 NOTE — TELEPHONE ENCOUNTER
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 HCV neg  There is still a small chance the virus can return. We will monitor blood for this.    Next labs due -  - HCV RNA - svr12 - 2/26/18

## 2018-01-22 ENCOUNTER — EPISODE CHANGES (OUTPATIENT)
Dept: HEPATOLOGY | Facility: CLINIC | Age: 63
End: 2018-01-22

## 2018-01-29 RX ORDER — PEN NEEDLE, DIABETIC 30 GX3/16"
NEEDLE, DISPOSABLE MISCELLANEOUS
Qty: 100 EACH | Refills: 12 | Status: SHIPPED | OUTPATIENT
Start: 2018-01-29 | End: 2018-02-20

## 2018-01-29 RX ORDER — INSULIN ASPART 100 [IU]/ML
12 INJECTION, SOLUTION INTRAVENOUS; SUBCUTANEOUS
Qty: 15 ML | Refills: 11 | Status: SHIPPED | OUTPATIENT
Start: 2018-01-29 | End: 2018-07-20 | Stop reason: SDUPTHER

## 2018-02-07 RX ORDER — CALCIUM CITRATE/VITAMIN D3 200MG-6.25
TABLET ORAL
Qty: 100 STRIP | Refills: 7 | Status: SHIPPED | OUTPATIENT
Start: 2018-02-07 | End: 2018-03-27 | Stop reason: SDUPTHER

## 2018-02-07 NOTE — TELEPHONE ENCOUNTER
----- Message from Cecilia Vega sent at 2/7/2018  9:40 AM CST -----  Contact: [phone]/ self   Patient would like refill of test strip sent to [facility] [address]. He is completely out. Please advise.

## 2018-02-07 NOTE — TELEPHONE ENCOUNTER
----- Message from Cecilia Vega sent at 2/7/2018  9:40 AM CST -----  Contact: [phone]/ self   Patient would like refill of test strip sent to [facility] [address]. He is completely out. Please advise.

## 2018-02-20 RX ORDER — PEN NEEDLE, DIABETIC 32 GX 1/6"
NEEDLE, DISPOSABLE MISCELLANEOUS
COMMUNITY
Start: 2017-12-08

## 2018-02-24 RX ORDER — NIFEDIPINE 60 MG/1
60 TABLET, EXTENDED RELEASE ORAL DAILY
Qty: 90 TABLET | Refills: 3 | Status: SHIPPED | OUTPATIENT
Start: 2018-02-24 | End: 2019-03-08 | Stop reason: SDUPTHER

## 2018-02-26 ENCOUNTER — LAB VISIT (OUTPATIENT)
Dept: LAB | Facility: HOSPITAL | Age: 63
End: 2018-02-26
Attending: FAMILY MEDICINE
Payer: MEDICARE

## 2018-02-26 DIAGNOSIS — B18.2 CHRONIC HEPATITIS C WITHOUT HEPATIC COMA: ICD-10-CM

## 2018-02-26 PROCEDURE — 87522 HEPATITIS C REVRS TRNSCRPJ: CPT

## 2018-02-26 PROCEDURE — 36415 COLL VENOUS BLD VENIPUNCTURE: CPT | Mod: PO

## 2018-02-28 ENCOUNTER — OFFICE VISIT (OUTPATIENT)
Dept: PAIN MEDICINE | Facility: CLINIC | Age: 63
End: 2018-02-28
Payer: MEDICARE

## 2018-02-28 VITALS
BODY MASS INDEX: 36.5 KG/M2 | HEIGHT: 74 IN | DIASTOLIC BLOOD PRESSURE: 100 MMHG | WEIGHT: 284.38 LBS | SYSTOLIC BLOOD PRESSURE: 160 MMHG

## 2018-02-28 DIAGNOSIS — M54.16 LUMBAR RADICULOPATHY: ICD-10-CM

## 2018-02-28 DIAGNOSIS — M96.1 POSTLAMINECTOMY SYNDROME OF LUMBAR REGION: Primary | ICD-10-CM

## 2018-02-28 DIAGNOSIS — G89.4 CHRONIC PAIN SYNDROME: ICD-10-CM

## 2018-02-28 DIAGNOSIS — Z51.81 ENCOUNTER FOR MONITORING OPIOID MAINTENANCE THERAPY: ICD-10-CM

## 2018-02-28 DIAGNOSIS — M47.816 LUMBAR SPONDYLOSIS: ICD-10-CM

## 2018-02-28 DIAGNOSIS — M43.10 ACQUIRED SPONDYLOLISTHESIS: ICD-10-CM

## 2018-02-28 DIAGNOSIS — Z79.891 ENCOUNTER FOR MONITORING OPIOID MAINTENANCE THERAPY: ICD-10-CM

## 2018-02-28 DIAGNOSIS — M51.36 DDD (DEGENERATIVE DISC DISEASE), LUMBAR: ICD-10-CM

## 2018-02-28 LAB
HCV LOG: <1.08 LOG (10) IU/ML
HCV RNA QUANT PCR: <12 IU/ML
HCV, QUALITATIVE: NOT DETECTED IU/ML

## 2018-02-28 PROCEDURE — 99999 PR PBB SHADOW E&M-EST. PATIENT-LVL III: CPT | Mod: PBBFAC,,, | Performed by: NURSE PRACTITIONER

## 2018-02-28 PROCEDURE — 99214 OFFICE O/P EST MOD 30 MIN: CPT | Mod: S$PBB,,, | Performed by: NURSE PRACTITIONER

## 2018-02-28 PROCEDURE — 99213 OFFICE O/P EST LOW 20 MIN: CPT | Mod: PBBFAC | Performed by: NURSE PRACTITIONER

## 2018-02-28 RX ORDER — OXYCODONE HYDROCHLORIDE 15 MG/1
15 TABLET ORAL 4 TIMES DAILY PRN
Qty: 120 TABLET | Refills: 0 | Status: SHIPPED | OUTPATIENT
Start: 2018-04-27 | End: 2018-05-27

## 2018-02-28 RX ORDER — OXYCODONE HYDROCHLORIDE 15 MG/1
15 TABLET ORAL 4 TIMES DAILY PRN
Qty: 120 TABLET | Refills: 0 | Status: SHIPPED | OUTPATIENT
Start: 2018-02-28 | End: 2018-03-27 | Stop reason: SDUPTHER

## 2018-02-28 RX ORDER — OXYCODONE HYDROCHLORIDE 15 MG/1
15 TABLET ORAL 4 TIMES DAILY PRN
Qty: 120 TABLET | Refills: 0 | Status: SHIPPED | OUTPATIENT
Start: 2018-03-29 | End: 2018-03-27 | Stop reason: SDUPTHER

## 2018-02-28 NOTE — PROGRESS NOTES
Chronic patient Established Note (Follow up visit)      SUBJECTIVE:    Vick Gonzalez presents to the clinic for a follow-up appointment for lower back pain and medication refill. He reports that he has good days and bad days. He continues to report low back pain with intermittent radiating pain down the back of his legs to the top of his foot. He reports increased leg swelling today. He will follow up with his PCP concerning this. He has had multiple procedures in the past with limited relief. He is not interested in further procedures at this time. He continues to take Roxicodone 15 mg QID PRN and Gabapentin with benefit and without adverse effects. He reports that he needs a refill of his Gabapentin. He denies any bowel or bladder incontinence. His pain today is 8/10.      Of note, the patient has a history of previous liver transplant and is monitored by hepatology.  His LFTs are chronically elevated which is believed to be due to HCV.    Pain Disability Index Review:  Last 3 PDI Scores 11/30/2017 5/31/2017 3/1/2017   Pain Disability Index (PDI) 32 27 40       Pain Medications:    - Opioids: Roxicodone (Oxycodone/Acetaminophen) 15 mg QID PRN pain    Others: Gabapentin 2400 mg daily    Opioid Contract: yes     report:  Reviewed and consistent with medication use as prescribed.    Pain Procedures:   1/15/15 Right TFESI @ L5 & S1     Physical Therapy/Home Exercise: no    Imaging:     Lumbar MRI 6/16/15  Narrative   Technique: Routine lumbar spine MRI performed without contrast.    Comparison: MRI 06/16/2015, CT 06/15/2015.    Findings:    There are postsurgical changes consistent with L5-S1 posterior instrumented fusion with bilateral pedicular screws, vertical stabilizing rods and an intervertebral disk spacer.  When compared to the MRI dated 06/16/2015 00:33, there are new postsurgical   changes consistent with left L5 hemilaminectomy.  There is a 2.5 cm ill-defined fluid collection at the site of  hemilaminectomy that is not well evaluated due to the lack of IV contrast but appears to extend anteriorly into the spinal canal and into the   left foraminal zone.  There is as possible small fluid collection within the anterior right epidural space that may also due to compression of the adjacent spinal canal.  There is stable grade I anterolistheses of L5 on S1 with persistent high signal   intensity adjacent to the left lateral aspect of the disk spacer that demonstrates moderate associated bone marrow edema of the adjacent vertebral endplates, findings that are when compared to the previous exam.  Note is made that there is an apparent   high signal intensity within the nerve roots posterior to the L5-S1 level that was not definitely present on the previous exam.    There is mild persistent height loss loss involving the L5 vertebral body, likely degenerative in nature.  Remaining vertebral body heights are well-maintained without findings to suggest acute fracture.    There is mild intervertebral disk spacing and desiccation at L4-L5 with a small circumferential disk bulge. No disk protrusion or extrusion. There is moderate bilateral facet arthropathy and mild bilateral uncomfortable and buckling at this level.  These   findings contribute to mild spinal canal stenosis and mild bilateral foraminal narrowing.    Noting aforementioned postsurgical changes, there is persistent severe bilateral foraminal narrowing that is difficult to accurately characterize due to adjacent artifact.    There is edema anterior to the L4, L5 and S1 vertebral bodies, presumably postsurgical in nature.  No drainable fluid collections identified. Visualized retroperitoneal organs are unremarkable.   Impression       Postsurgical changes of L5-S1 posterior spinal fusion and left L5 hemilaminectomy. There is an ill-defined fluid collection at the site of hemilaminectomy that is not well evaluated due to the lack of IV contrast but  appears to extend anteriorly with   suspected resulting mass effect on the spinal canal and the left foraminal zone.  There is a suspected small fluid collection within the anterior right epidural space that may contribute to additional mass effect on the adjacent spinal canal. While   findings may represent sequela of expected postsurgical changes, continued follow-up is advised to ensure improvement and/or resolution.    Persistent abnormal high signal intensity adjacent to the left lateral aspect of the intervertebral disk spacer with moderate associated bone marrow edema of the adjacent vertebral endplates. Findings are similar when compared to the previous exam could   represent postsurgical changes. Early infection could have a similar appearance and is possible. Continued follow-up is advised.    Apparent high signal intensity within the nerve roots posterior to the L5-S1 level that was not definitely present on the previous exam. Findings may be artifactual in nature however, sequela of nerve root inflammation/edema is possible noting recent   surgery.    This report has been flagged in the Epic medical record     .  CMP  Sodium   Date Value Ref Range Status   01/08/2018 139 136 - 145 mmol/L Final     Potassium   Date Value Ref Range Status   01/08/2018 3.7 3.5 - 5.1 mmol/L Final     Chloride   Date Value Ref Range Status   01/08/2018 104 95 - 110 mmol/L Final     CO2   Date Value Ref Range Status   01/08/2018 27 23 - 29 mmol/L Final     Glucose   Date Value Ref Range Status   01/08/2018 148 (H) 70 - 110 mg/dL Final     BUN, Bld   Date Value Ref Range Status   01/08/2018 13 6 - 20 mg/dL Final     Creatinine   Date Value Ref Range Status   01/08/2018 1.2 0.5 - 1.4 mg/dL Final     Calcium   Date Value Ref Range Status   01/08/2018 9.1 8.7 - 10.5 mg/dL Final     Total Protein   Date Value Ref Range Status   01/08/2018 7.5 6.0 - 8.4 g/dL Final     Albumin   Date Value Ref Range Status   01/08/2018 3.5 3.5 - 5.2  g/dL Final     Total Bilirubin   Date Value Ref Range Status   01/08/2018 0.4 0.1 - 1.0 mg/dL Final     Comment:     For infants and newborns, interpretation of results should be based  on gestational age, weight and in agreement with clinical  observations.  Premature Infant recommended reference ranges:  Up to 24 hours.............<8.0 mg/dL  Up to 48 hours............<12.0 mg/dL  3-5 days..................<15.0 mg/dL  6-29 days.................<15.0 mg/dL       Alkaline Phosphatase   Date Value Ref Range Status   01/08/2018 90 55 - 135 U/L Final     AST   Date Value Ref Range Status   01/08/2018 30 10 - 40 U/L Final     ALT   Date Value Ref Range Status   01/08/2018 32 10 - 44 U/L Final     Anion Gap   Date Value Ref Range Status   01/08/2018 8 8 - 16 mmol/L Final     eGFR if    Date Value Ref Range Status   01/08/2018 >60.0 >60 mL/min/1.73 m^2 Final     eGFR if non    Date Value Ref Range Status   01/08/2018 >60.0 >60 mL/min/1.73 m^2 Final     Comment:     Calculation used to obtain the estimated glomerular filtration  rate (eGFR) is the CKD-EPI equation.            Allergies:   Review of patient's allergies indicates:   Allergen Reactions    Naproxen      Other reaction(s): problems w/liver/kid    Oxaprozin      Other reaction(s): cause problems w/kid/liver       Current Medications:   Current Outpatient Prescriptions   Medication Sig Dispense Refill    allopurinol (ZYLOPRIM) 100 MG tablet Take 1 tablet (100 mg total) by mouth 2 (two) times daily. 60 tablet 11    amitriptyline (ELAVIL) 25 MG tablet TAKE 1 TABLET (25 MG TOTAL) BY MOUTH EVERY EVENING. FOR NERVE PAIN AND SLEEP 30 tablet 5    blood sugar diagnostic Strp 1 strip by Misc.(Non-Drug; Combo Route) route 4 (four) times daily before meals and nightly. 100 strip 11    blood-glucose meter Misc 1 each by Misc.(Non-Drug; Combo Route) route 4 (four) times daily before meals and nightly. 1 each 0    calcium  "carbonate-vitamin D3 (CALTRATE 600 + D) 600 mg(1,500mg) -400 unit Chew       colchicine 0.6 mg tablet Take 1 tablet (0.6 mg total) by mouth 2 (two) times daily. 30 tablet 2    famotidine (PEPCID) 40 MG tablet Take 1 tablet (40 mg total) by mouth once daily. 30 tablet 2    gabapentin (NEURONTIN) 800 MG tablet Take 1 tablet (800 mg total) by mouth 3 (three) times daily. 90 tablet 11    insulin aspart (NOVOLOG) 100 unit/mL InPn pen Inject 12 Units into the skin 3 (three) times daily with meals. 10.8 mL 11    insulin detemir (LEVEMIR FLEXTOUCH) 100 unit/mL (3 mL) SubQ InPn pen Inject 60 Units into the skin every evening. 15 mL 11    lancets Misc 1 each by Misc.(Non-Drug; Combo Route) route 4 (four) times daily before meals and nightly. 200 each 11    lancing device Misc 1 each by Misc.(Non-Drug; Combo Route) route 4 (four) times daily before meals and nightly. 1 each 0    levothyroxine (SYNTHROID) 88 MCG tablet TAKE 1 TABLET (88 MCG TOTAL) BY MOUTH ONCE DAILY. 30 tablet 11    lisinopril 10 MG tablet TAKE 1 TABLET (10 MG TOTAL) BY MOUTH ONCE DAILY. 90 tablet 2    metoprolol tartrate (LOPRESSOR) 50 MG tablet TAKE 3 TABLETS BY MOUTH 2 (TWO) TIMES DAILY. 180 tablet 3    multivitamin (THERAGRAN) per tablet Take 1 tablet by mouth.      NIFEdipine (ADALAT CC) 60 MG TbSR TAKE 1 TABLET (60 MG TOTAL) BY MOUTH ONCE DAILY. 90 tablet 3    NOVOFINE PLUS 32 gauge x 1/6" Ndle       sertraline (ZOLOFT) 100 MG tablet TAKE 1 TABLET (100 MG TOTAL) BY MOUTH ONCE DAILY. 30 tablet 7    sildenafil (REVATIO) 20 mg Tab Take 5 po 1 hour before sexual activity 50 tablet 11    tacrolimus (PROGRAF) 1 MG Cap Take 2mg in AM and 1mg in PM, PO. Liver Transplant Z94.4 90 capsule 11    TRUE METRIX GLUCOSE TEST STRIP Strp TEST 4 TIMES A DAY BEFORE MEALS AND AT BEDTIME 100 strip 7     No current facility-administered medications for this visit.        REVIEW OF SYSTEMS:    GENERAL:  No weight loss, malaise or fevers.  HEENT:  Negative for " frequent or significant headaches.  NECK:  Negative for lumps, goiter, pain and significant neck swelling.  RESPIRATORY:  Negative for cough, wheezing or shortness of breath.  CARDIOVASCULAR:  Negative for chest pain, leg swelling or palpitations. H/O hypertension.  GI:  Negative for abdominal discomfort, blood in stools or black stools or change in bowel habits.  MUSCULOSKELETAL:  See HPI.  SKIN:  Negative for lesions, rash, and itching.  PSYCH:  Negative for sleep disturbance, mood disorder and recent psychosocial stressors.  HEMATOLOGY/LYMPHOLOGY:  Negative for prolonged bleeding, bruising easily or swollen nodes. H/O liver transplant.  NEURO:   No history of headaches, syncope, paralysis, seizures or tremors.  ENDO: Diabetes.   All other reviewed and negative other than HPI.    Past Medical History:  Past Medical History:   Diagnosis Date    Anemia     Anxiety     Chronic hepatitis C virus infection - RELAPSE following harvoni + RBV 8/23/2011    Completed 12 weeks Harvoni + RBV w/ RELAPSE 6 weeks out (3/2016)  Wk 1 Hgb 12.4, prograf 6.8 Wk 2 Hgb 12.7 Wk 4 Hgb 12.8, prograf 5.4; HCV RNA 47. INCREASE RBV to 600 Wk 5 Hgb 12.6 Wk 6 Hgb 12.4, HCV RNA <12, detected. INCREASE  Wk 7 Hgb 11.8 Wk 8 Hgb 11.7, prograf 4.0. INCREASE RBV 1000, INCREASE PROGRAF 2/1 Wk 9 Hgb 12.4, prograf 3.6  HCV RNA <12, detected. INCREASE PROGRAF 2/2 Wk 10 hgb     Chronic pain syndrome 7/13/2011    CKD (chronic kidney disease), stage III     Diabetes mellitus type II, uncontrolled     Discitis of lumbosacral region 1/16/2015    ED (erectile dysfunction)     Genital herpes     Gout, arthritis     History of alcohol abuse     History of positive PPD, treatment status unknown     Pulmonary granulomas, negative sputum cultures for AFB and indeterminate quantferon test    History of substance abuse     Hypertension     Hypothyroidism     Liver replaced by transplant 8/23/2011    DATE: 12/16/2013  LIVER BIOPSY : REASON:  hep  "C staging  PATHOLOGY COMMITTEE NOTE/PLAN: :grade  1 / stage 1        Peptic ulcer disease        Past Surgical History:  Past Surgical History:   Procedure Laterality Date    CHOLECYSTECTOMY      LIVER TRANSPLANT  06/2010    SPINE SURGERY         Family History:  Family History   Problem Relation Age of Onset    Cancer Mother     Diabetes Mother     Heart disease Mother     Diabetes Sister     Cancer Maternal Uncle 82     colon CA    Melanoma Neg Hx     Psoriasis Neg Hx     Lupus Neg Hx     Eczema Neg Hx     Acne Neg Hx        Social History:  Social History     Social History    Marital status:      Spouse name: N/A    Number of children: N/A    Years of education: N/A     Social History Main Topics    Smoking status: Former Smoker    Smokeless tobacco: Never Used    Alcohol use No      Comment: over 5 years ago, none currently    Drug use: No    Sexual activity: Not on file     Other Topics Concern    Not on file     Social History Narrative    No narrative on file       OBJECTIVE:    BP (!) 160/100   Ht 6' 2" (1.88 m)   Wt 129 kg (284 lb 6.3 oz)   BMI 36.51 kg/m²     PHYSICAL EXAMINATION:    General appearance: Well appearing, in no acute distress, alert and oriented x3.  Psych:  Mood and affect appropriate.  Skin: Skin color, texture, turgor normal, no rashes or lesions, in both upper and lower body.  Head/face:  Atraumatic, normocephalic. No palpable lymph nodes.  GI: Abdomen soft and non-tender.  Back: Straight leg raising in the sitting and supine positions is negative to radicular pain. Limited lumbar flexion and extension with pain.  Positive facet loading bilaterally. There is pain with palpation over lumbar spine and bilateral SI joints.  Positive HUBER bilaterally.  Extremities: Peripheral joint ROM is full and pain free without obvious instability or laxity in all four extremities. No deformities or skin discoloration. Good capillary refill. +2 pitting edema to " bilateral LE.   Musculoskeletal:  Right plantar flexion 4/5.  All other LE strength 5/5 and symmetric.  No atrophy or tone abnormalities are noted.  Neuro: Bilateral upper and lower extremity coordination and muscle stretch reflexes are physiologic and symmetric.  Plantar response are downgoing.  Decreased sensation to anterior aspect of right foot.   Gait: Antalgic- ambulates with cane.    ASSESSMENT: 62 y.o. year old male with lower back and right leg pain, consistent with the following diagnoses:     1. Postlaminectomy syndrome of lumbar region  oxyCODONE (ROXICODONE) 15 MG Tab    oxyCODONE (ROXICODONE) 15 MG Tab    oxyCODONE (ROXICODONE) 15 MG Tab   2. Lumbar radiculopathy  oxyCODONE (ROXICODONE) 15 MG Tab    oxyCODONE (ROXICODONE) 15 MG Tab    oxyCODONE (ROXICODONE) 15 MG Tab   3. Acquired spondylolisthesis  oxyCODONE (ROXICODONE) 15 MG Tab    oxyCODONE (ROXICODONE) 15 MG Tab    oxyCODONE (ROXICODONE) 15 MG Tab   4. DDD (degenerative disc disease), lumbar  oxyCODONE (ROXICODONE) 15 MG Tab    oxyCODONE (ROXICODONE) 15 MG Tab    oxyCODONE (ROXICODONE) 15 MG Tab   5. Lumbar spondylosis  oxyCODONE (ROXICODONE) 15 MG Tab    oxyCODONE (ROXICODONE) 15 MG Tab    oxyCODONE (ROXICODONE) 15 MG Tab   6. Chronic pain syndrome  oxyCODONE (ROXICODONE) 15 MG Tab    oxyCODONE (ROXICODONE) 15 MG Tab    oxyCODONE (ROXICODONE) 15 MG Tab   7. Encounter for monitoring opioid maintenance therapy  oxyCODONE (ROXICODONE) 15 MG Tab    oxyCODONE (ROXICODONE) 15 MG Tab    oxyCODONE (ROXICODONE) 15 MG Tab         PLAN:     - Previous imaging was reviewed and discussed with the patient today. Recent labs reviewed with patient today.     - His BP is elevated today. He reports that he did take his medication this morning. He denies any headaches. I encouraged him to make an appointment with his PCP regarding blood pressure and edema.     - Patient can continue Roxicodone 15 mg QID PRN pain, #120. 3 months of prescriptions with appropriate  dates was supplied today.     - The [place] Board of Pharmacy website for prescription monitoring was consulted today, and it does not suggest any deviations in conflict with the patient's controlled substance contract with our clinic. Will continue current therapy with frequent monitoring of the controlled substance database, and urine drug screens on followup. Refill approved.  Medication management by Dr. Penn.    - Continue Gabapentin 800 mg TID.     - UDS from 11/30/2017 reviewed and consistent.    - The patient will continue a home exercise routine to help with pain and strengthening.      - RTC in 3 months or sooner if needed.    - Counseled patient regarding the importance of constant sleeping habits and physical therapy.     - Dr. Penn was consulted on the patient and agrees with this plan.    The above plan and management options were discussed at length with patient. Patient is in agreement with the above and verbalized understanding.    Emma Batista NP  02/28/2018

## 2018-03-01 ENCOUNTER — TELEPHONE (OUTPATIENT)
Dept: TRANSPLANT | Facility: CLINIC | Age: 63
End: 2018-03-01

## 2018-03-01 ENCOUNTER — TELEPHONE (OUTPATIENT)
Dept: HEPATOLOGY | Facility: CLINIC | Age: 63
End: 2018-03-01

## 2018-03-01 ENCOUNTER — EPISODE CHANGES (OUTPATIENT)
Dept: HEPATOLOGY | Facility: CLINIC | Age: 63
End: 2018-03-01

## 2018-03-01 DIAGNOSIS — Z86.19 HISTORY OF HEPATITIS C: Primary | ICD-10-CM

## 2018-03-01 NOTE — TELEPHONE ENCOUNTER
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 Relapse is not expected.   No immunity is conferred and patient could be reinfected.     Pt's wife has been notified     Please schedule labs - HCV RNA - SVR24 - 5/28/18

## 2018-03-01 NOTE — TELEPHONE ENCOUNTER
----- Message from Jennifer B. Scheuermann, PA sent at 3/1/2018 12:41 PM CST -----  Virus negative 12 weeks after ending HCV therapy - indicates SVR12  Pt's wife notified  I will check HCV again for SVR24

## 2018-03-05 ENCOUNTER — LAB VISIT (OUTPATIENT)
Dept: LAB | Facility: HOSPITAL | Age: 63
End: 2018-03-05
Attending: INTERNAL MEDICINE
Payer: MEDICARE

## 2018-03-05 DIAGNOSIS — Z94.4 LIVER TRANSPLANTED: ICD-10-CM

## 2018-03-05 LAB
ALBUMIN SERPL BCP-MCNC: 3.7 G/DL
ALP SERPL-CCNC: 85 U/L
ALT SERPL W/O P-5'-P-CCNC: 29 U/L
ANION GAP SERPL CALC-SCNC: 10 MMOL/L
AST SERPL-CCNC: 30 U/L
BASOPHILS # BLD AUTO: 0.03 K/UL
BASOPHILS NFR BLD: 0.4 %
BILIRUB SERPL-MCNC: 0.4 MG/DL
BUN SERPL-MCNC: 21 MG/DL
CALCIUM SERPL-MCNC: 9.3 MG/DL
CHLORIDE SERPL-SCNC: 105 MMOL/L
CO2 SERPL-SCNC: 24 MMOL/L
CREAT SERPL-MCNC: 1.2 MG/DL
DIFFERENTIAL METHOD: ABNORMAL
EOSINOPHIL # BLD AUTO: 0.5 K/UL
EOSINOPHIL NFR BLD: 7.1 %
ERYTHROCYTE [DISTWIDTH] IN BLOOD BY AUTOMATED COUNT: 14.3 %
EST. GFR  (AFRICAN AMERICAN): >60 ML/MIN/1.73 M^2
EST. GFR  (NON AFRICAN AMERICAN): >60 ML/MIN/1.73 M^2
GLUCOSE SERPL-MCNC: 127 MG/DL
HCT VFR BLD AUTO: 41.1 %
HGB BLD-MCNC: 13.2 G/DL
IMM GRANULOCYTES # BLD AUTO: 0.02 K/UL
IMM GRANULOCYTES NFR BLD AUTO: 0.3 %
LYMPHOCYTES # BLD AUTO: 2.4 K/UL
LYMPHOCYTES NFR BLD: 33.3 %
MCH RBC QN AUTO: 26.8 PG
MCHC RBC AUTO-ENTMCNC: 32.1 G/DL
MCV RBC AUTO: 83 FL
MONOCYTES # BLD AUTO: 0.6 K/UL
MONOCYTES NFR BLD: 8.7 %
NEUTROPHILS # BLD AUTO: 3.7 K/UL
NEUTROPHILS NFR BLD: 50.2 %
NRBC BLD-RTO: 0 /100 WBC
PLATELET # BLD AUTO: 128 K/UL
PMV BLD AUTO: 13.8 FL
POTASSIUM SERPL-SCNC: 4.1 MMOL/L
PROT SERPL-MCNC: 7.5 G/DL
RBC # BLD AUTO: 4.93 M/UL
SODIUM SERPL-SCNC: 139 MMOL/L
WBC # BLD AUTO: 7.32 K/UL

## 2018-03-05 PROCEDURE — 85025 COMPLETE CBC W/AUTO DIFF WBC: CPT

## 2018-03-05 PROCEDURE — 80053 COMPREHEN METABOLIC PANEL: CPT

## 2018-03-05 PROCEDURE — 36415 COLL VENOUS BLD VENIPUNCTURE: CPT | Mod: PO

## 2018-03-05 PROCEDURE — 80197 ASSAY OF TACROLIMUS: CPT

## 2018-03-06 ENCOUNTER — TELEPHONE (OUTPATIENT)
Dept: TRANSPLANT | Facility: CLINIC | Age: 63
End: 2018-03-06

## 2018-03-06 DIAGNOSIS — K74.69 OTHER CIRRHOSIS OF LIVER: Primary | ICD-10-CM

## 2018-03-06 DIAGNOSIS — Z94.4 LIVER TRANSPLANTED: ICD-10-CM

## 2018-03-06 LAB — TACROLIMUS BLD-MCNC: 5.4 NG/ML

## 2018-03-06 NOTE — LETTER
March 6, 2018    Vick Gonzalez  [address] [facility] [address]          Dear Vick Gonzalez:  MRN: [id number]    Your lab results were stable.  There are no medicine changes.  Please have your labs drawn again on 5/28/18.      If you cannot have your labs drawn on the scheduled date, it is your responsibility to call the transplant department to reschedule.  To reschedule or make an appointment, please as to speak to or leave a message for my assistant, Portia Lincoln, at [phone].  When leaving a message for Latonia Pearce, or myself, we ask that you leave a brief message regarding your request.    Sincerely,    Kandy Herrera, RN, BSN  Liver Transplant Coordinator  [facility]  [address] [place] [id number] [phone]

## 2018-03-06 NOTE — TELEPHONE ENCOUNTER
Letter sent, labs stable and no medication changes are needed. Repeat labs due 5/28/18 per protocol.

## 2018-03-27 ENCOUNTER — OFFICE VISIT (OUTPATIENT)
Dept: FAMILY MEDICINE | Facility: CLINIC | Age: 63
End: 2018-03-27
Payer: MEDICARE

## 2018-03-27 VITALS
HEART RATE: 85 BPM | HEIGHT: 75 IN | TEMPERATURE: 99 F | DIASTOLIC BLOOD PRESSURE: 88 MMHG | SYSTOLIC BLOOD PRESSURE: 152 MMHG | OXYGEN SATURATION: 98 % | BODY MASS INDEX: 35.75 KG/M2 | WEIGHT: 287.5 LBS

## 2018-03-27 DIAGNOSIS — E03.9 HYPOTHYROIDISM, UNSPECIFIED TYPE: ICD-10-CM

## 2018-03-27 DIAGNOSIS — G89.4 CHRONIC PAIN SYNDROME: ICD-10-CM

## 2018-03-27 DIAGNOSIS — E78.5 HYPERLIPIDEMIA, UNSPECIFIED HYPERLIPIDEMIA TYPE: ICD-10-CM

## 2018-03-27 DIAGNOSIS — I73.9 PAD (PERIPHERAL ARTERY DISEASE): ICD-10-CM

## 2018-03-27 DIAGNOSIS — N18.30 CKD (CHRONIC KIDNEY DISEASE), STAGE III: ICD-10-CM

## 2018-03-27 DIAGNOSIS — Z94.4 LIVER TRANSPLANT RECIPIENT: ICD-10-CM

## 2018-03-27 DIAGNOSIS — N52.9 ERECTILE DYSFUNCTION, UNSPECIFIED ERECTILE DYSFUNCTION TYPE: ICD-10-CM

## 2018-03-27 PROCEDURE — 99214 OFFICE O/P EST MOD 30 MIN: CPT | Mod: S$PBB,,, | Performed by: FAMILY MEDICINE

## 2018-03-27 PROCEDURE — 99215 OFFICE O/P EST HI 40 MIN: CPT | Mod: PBBFAC,PO | Performed by: FAMILY MEDICINE

## 2018-03-27 PROCEDURE — 99999 PR PBB SHADOW E&M-EST. PATIENT-LVL V: CPT | Mod: PBBFAC,,, | Performed by: FAMILY MEDICINE

## 2018-03-27 RX ORDER — LISINOPRIL 20 MG/1
20 TABLET ORAL DAILY
Qty: 90 TABLET | Refills: 3 | Status: SHIPPED | OUTPATIENT
Start: 2018-03-27 | End: 2018-10-29 | Stop reason: SDUPTHER

## 2018-03-27 RX ORDER — METOPROLOL SUCCINATE 200 MG/1
200 TABLET, EXTENDED RELEASE ORAL DAILY
Qty: 30 TABLET | Refills: 11 | Status: SHIPPED | OUTPATIENT
Start: 2018-03-27 | End: 2019-03-31 | Stop reason: SDUPTHER

## 2018-03-27 RX ORDER — PEN NEEDLE, DIABETIC 31 GX5/16"
NEEDLE, DISPOSABLE MISCELLANEOUS
COMMUNITY
Start: 2018-03-09 | End: 2023-01-13

## 2018-03-27 NOTE — PATIENT INSTRUCTIONS
Goal blood pressure less than 140/90.  Increase lisinopril to 20 mg daily  Keep nifedipine at 60 mg daily  Stopping the metoprolol tartrate 50 (3 pills twice daily.   Starting on metoprolol succinate 200 mg, just once daily.      Guideline for managing Lantus insulin: GO UP TO 60u of lantus     1. Check your fasting blood sugars every morning for 5 days.  Goal is to have most of your fasting blood sugars below 140.     2. If most of your fasting blood sugars in that 5 day period are above 140, increase your Lantus dose by 5 units.     3. If most of your fasting blood sugars in that 5 day period of are below 140 but higher than 70, keep your Lantus dose the same.     4. If most of your fasting blood sugars in that 5 day period are below 70, decrease your Lantus dose by 5 units.     5. Make sure to write your blood sugars down in a notebook; do not simply just store the numbers in your glucose meter.       Sliding Scale for Novolog             Blood Glucose reading    :     how many extra units of Novolog to take                150-200                    :           +2                 200-250  : +4      250-300  : +6                 300-350  :  +8      >350   :           +10      SAMPLE BLOOD SUGAR CHART                 DATE  Before breakfast Before lunch Before dinner Before bedtime

## 2018-03-27 NOTE — PROGRESS NOTES
(Portions of this note were dictated using voice recognition software and may contain dictation related errors in spelling/grammar/syntax not found on text review)    CC:   Chief Complaint   Patient presents with    Leg Swelling       HPI: 62 y.o. male Last visit was in October for annual exam.  Medical history below.   He does have she significant for uncontrolled diabetes, PAD, cirrhosis status post liver transplant, hypothyroidism, chronic kidney disease stage III    Diabetes on Lantus 60 units daily with NovoLog up to 16 units with meals.  Compliant with regimen.  Does not have his sugar log with him today.  Last A1c was improved at 7.2.  Does admit he likes a lot of sweets in the sometimes gets them in trouble with his sugar    Hypertension: Has been on a regimen of metoprolol tartrate 50 mg, 3 pills twice a day for many years now but he states that this is too much so he stopped taking his afternoon dose and just take the morning dose.  Furthermore is on nifedipine 60 mg daily and lisinopril 10 mg daily.  Does occasionally get some legs 1 from time time this is not constant.  His admit that he needs to watch his sodium intake    Chronic kidney disease: Last labs as below     Chronic liver disease with cirrhosis status post liver transplant.  Follows with hepatology.       Hyperlipidemia, was on Zetia in the past but not currently.  Cardiology is entertaining putting him back on secondary to his PAD.  Cannot take statins secondary to his liver disease     L DDD and chronic pain syndrome followed by pain management, on oxycodone, gabapentin, amitriptyline.     Additionally is on Zoloft for anxiety     PAD history  Complains of ED issues likely on account of his PAD and diabetes and kidney disease.  Saw urology and started generic sildenafil which does help a bit    Past Medical History:   Diagnosis Date    Anemia     Anxiety     Chronic pain syndrome 7/13/2011    CKD (chronic kidney disease), stage III      Diabetes mellitus type II, uncontrolled     Discitis of lumbosacral region 1/16/2015    ED (erectile dysfunction)     Genital herpes     Gout, arthritis     History of alcohol abuse     History of hepatitis C, s/p successful Rx w/ SVR12 - 2/2018 8/23/2011    Completed 24wks Epclusa + RBV w/SVR12 - 2/2018  -     History of positive PPD, treatment status unknown     Pulmonary granulomas, negative sputum cultures for AFB and indeterminate quantferon test    History of substance abuse     Hypertension     Hypothyroidism     Liver replaced by transplant 8/23/2011    DATE: 12/16/2013  LIVER BIOPSY : REASON:  hep C staging  PATHOLOGY COMMITTEE NOTE/PLAN: :grade  1 / stage 1        Peptic ulcer disease        Past Surgical History:   Procedure Laterality Date    CHOLECYSTECTOMY      LIVER TRANSPLANT  06/2010    SPINE SURGERY         Family History   Problem Relation Age of Onset    Cancer Mother     Diabetes Mother     Heart disease Mother     Diabetes Sister     Cancer Maternal Uncle 82     colon CA    Melanoma Neg Hx     Psoriasis Neg Hx     Lupus Neg Hx     Eczema Neg Hx     Acne Neg Hx        Social History     Social History    Marital status:      Spouse name: N/A    Number of children: N/A    Years of education: N/A     Occupational History    Not on file.     Social History Main Topics    Smoking status: Former Smoker    Smokeless tobacco: Never Used    Alcohol use No      Comment: over 5 years ago, none currently    Drug use: No    Sexual activity: Not on file     Other Topics Concern    Not on file     Social History Narrative    No narrative on file     SCREENINGS  Colonoscopy 2015, return in 10 years  prostate testing 2016/8      Immunizations  Pneumovax up-to-date 2008  Hepatitis B series up-to-date  Patient states he had tetanus shot within the last 10 years  PCV, advised prescription from local pharmacy downstairs although it does not appear that this has been done.   Advised to do it now         Lab Results   Component Value Date    WBC 7.32 03/05/2018    HGB 13.2 (L) 03/05/2018    HCT 41.1 03/05/2018     (L) 03/05/2018    CHOL 140 10/02/2017    TRIG 128 10/02/2017    HDL 30 (L) 10/02/2017    ALT 29 03/05/2018    AST 30 03/05/2018     03/05/2018    K 4.1 03/05/2018     03/05/2018    CREATININE 1.2 03/05/2018    CALCIUM 9.3 03/05/2018    BUN 21 (H) 03/05/2018    CO2 24 03/05/2018    TSH 2.276 10/02/2017    PSA 0.30 09/06/2016    INR 1.0 11/01/2017    HGBA1C 7.2 (H) 10/02/2017    LDLCALC 84.4 10/02/2017     (H) 03/05/2018       Hemoglobin A1C   Date Value Ref Range Status   10/02/2017 7.2 (H) 4.0 - 5.6 % Final     Comment:     According to ADA guidelines, hemoglobin A1c <7.0% represents  optimal control in non-pregnant diabetic patients. Different  metrics may apply to specific patient populations.   Standards of Medical Care in Diabetes-2016.  For the purpose of screening for the presence of diabetes:  <5.7%     Consistent with the absence of diabetes  5.7-6.4%  Consistent with increasing risk for diabetes   (prediabetes)  >or=6.5%  Consistent with diabetes  Currently, no consensus exists for use of hemoglobin A1c  for diagnosis of diabetes for children.  This Hemoglobin A1c assay has significant interference with fetal   hemoglobin   (HbF). The results are invalid for patients with abnormal amounts of   HbF,   including those with known Hereditary Persistence   of Fetal Hemoglobin. Heterozygous hemoglobin variants (HbAS, HbAC,   HbAD, HbAE, HbA2) do not significantly interfere with this assay;   however, presence of multiple variants in a sample may impact the %   interference.     02/21/2017 9.0 (H) 4.5 - 6.2 % Final     Comment:     According to ADA guidelines, hemoglobin A1C <7.0% represents  optimal control in non-pregnant diabetic patients.  Different  metrics may apply to specific populations.   Standards of Medical Care in Diabetes - 2016.  For  the purpose of screening for the presence of diabetes:  <5.7%     Consistent with the absence of diabetes  5.7-6.4%  Consistent with increasing risk for diabetes   (prediabetes)  >or=6.5%  Consistent with diabetes  Currently no consensus exists for use of hemoglobin A1C  for diagnosis of diabetes for children.     10/04/2016 13.9 (H) 4.5 - 6.2 % Final     Comment:     According to ADA guidelines, hemoglobin A1C <7.0% represents  optimal control in non-pregnant diabetic patients.  Different  metrics may apply to specific populations.   Standards of Medical Care in Diabetes - 2016.  For the purpose of screening for the presence of diabetes:  <5.7%     Consistent with the absence of diabetes  5.7-6.4%  Consistent with increasing risk for diabetes   (prediabetes)  >or=6.5%  Consistent with diabetes  Currently no consensus exists for use of hemoglobin A1C  for diagnosis of diabetes for children.     09/06/2016 8.3 (H) 4.5 - 6.2 % Final     Comment:     According to ADA guidelines, hemoglobin A1C <7.0% represents  optimal control in non-pregnant diabetic patients.  Different  metrics may apply to specific populations.   Standards of Medical Care in Diabetes - 2016.  For the purpose of screening for the presence of diabetes:  <5.7%     Consistent with the absence of diabetes  5.7-6.4%  Consistent with increasing risk for diabetes   (prediabetes)  >or=6.5%  Consistent with diabetes  Currently no consensus exists for use of hemoglobin A1C  for diagnosis of diabetes for children.           ROS:  GENERAL: No fever, chills, fatigability or weight loss.  SKIN: No rashes, no itching.  HEAD: No headaches.  EYES: No visual changes  EARS: No ear pain or changes in hearing.  NOSE: No congestion or rhinorrhea.  MOUTH & THROAT: No hoarseness, change in voice, or sore throat.  NODES: Denies swollen glands.  CHEST: Denies NARAYANAN, cyanosis, wheezing, cough and sputum production.  CARDIOVASCULAR: Denies chest pain, PND, orthopnea.  ABDOMEN:  No nausea, vomiting, or changes in bowel function.  URINARY: No flank pain, dysuria or hematuria.  PERIPHERAL VASCULAR: Above.  MUSCULOSKELETAL: Chronic back pain.  No acute issues  NEUROLOGIC: No weakness or numbness.    Vital signs reviewed  PE:   APPEARANCE: Well nourished, well developed, in no acute distress.    HEAD: Normocephalic, atraumatic.  EYES: PERRL. EOMI.   Conjunctivae noninjected.  EARS: TM's intact. Light reflex normal. No retraction or perforation  NOSE: Mucosa pink. Airway clear.  MOUTH & THROAT: No tonsillar enlargement. No pharyngeal erythema or exudate.   NECK: Supple with no cervical lymphadenopathy.  No carotid bruits.  No thyromegaly  CHEST: Good inspiratory effort. Lungs clear to auscultation with no wheezes or crackles.  CARDIOVASCULAR: Normal S1, S2. No rubs, murmurs, or gallops.  ABDOMEN: Bowel sounds normal. Not distended. Soft. No tenderness or masses. No organomegaly.  EXTREMITIES: 1+ pitting edema bilateral ankles left greater than right ([place] of prior injury      IMPRESSION  1. Uncontrolled type 2 diabetes mellitus with diabetic neuropathy, with long-term current use of insulin    2. CKD (chronic kidney disease), stage III    3. Hyperlipidemia, unspecified hyperlipidemia type    4. Liver transplant recipient    5. Hypothyroidism, unspecified type    6. Chronic pain syndrome    7. Erectile dysfunction, unspecified erectile dysfunction type    8. PAD (peripheral artery disease)            PLAN  Orders Placed This Encounter   Procedures    (In Office Administered) Pneumococcal Conjugate Vaccine (13 Valent) (IM)    CBC auto differential    Comprehensive metabolic panel    Hemoglobin A1c    Lipid panel    TSH     Hypertension: Increase lisinopril to 20 mg.  Continue nifedipine 60 mg daily  Discussed that on the metoprolol tartrate formulation if he is only taking this once a day he is  likely not getting sustained action of the medication throughout the day.  I will stop his  metoprolol tartrate him a initiate metoprolol succinate 200 mg a day.  Continue blood pressure monitoring, goal to be less than 140/90    Diabetes health maintenance:  -- advise regular and consistent glucose monitoring and medication compliance.  -- advise daily foot checks  -- advise yearly ophthalmologic exams  -- advise adequate dietary and exercise modification  -- advise regular dental visits  -- advise daily low-dose aspirin use if patient is not on other anticoagulants and there are no other contraindications.  -- Medication management: Continue Lantus 60 and NovoLog 16 Qac.  Titration chart again provided along with stable blood sugar chart.  Check labs above    Continue specialty follow-up as directed above    Prevnar ordered

## 2018-03-28 ENCOUNTER — LAB VISIT (OUTPATIENT)
Dept: LAB | Facility: HOSPITAL | Age: 63
End: 2018-03-28
Attending: FAMILY MEDICINE
Payer: MEDICARE

## 2018-03-28 DIAGNOSIS — Z94.4 LIVER TRANSPLANT RECIPIENT: ICD-10-CM

## 2018-03-28 DIAGNOSIS — E03.9 HYPOTHYROIDISM, UNSPECIFIED TYPE: ICD-10-CM

## 2018-03-28 DIAGNOSIS — G89.4 CHRONIC PAIN SYNDROME: ICD-10-CM

## 2018-03-28 DIAGNOSIS — E78.5 HYPERLIPIDEMIA, UNSPECIFIED HYPERLIPIDEMIA TYPE: ICD-10-CM

## 2018-03-28 DIAGNOSIS — N18.30 CKD (CHRONIC KIDNEY DISEASE), STAGE III: ICD-10-CM

## 2018-03-28 DIAGNOSIS — N52.9 ERECTILE DYSFUNCTION, UNSPECIFIED ERECTILE DYSFUNCTION TYPE: ICD-10-CM

## 2018-03-28 DIAGNOSIS — I73.9 PAD (PERIPHERAL ARTERY DISEASE): ICD-10-CM

## 2018-03-28 LAB
ALBUMIN SERPL BCP-MCNC: 3.7 G/DL
ALP SERPL-CCNC: 82 U/L
ALT SERPL W/O P-5'-P-CCNC: 31 U/L
ANION GAP SERPL CALC-SCNC: 10 MMOL/L
AST SERPL-CCNC: 32 U/L
BASOPHILS # BLD AUTO: 0.03 K/UL
BASOPHILS NFR BLD: 0.3 %
BILIRUB SERPL-MCNC: 0.7 MG/DL
BUN SERPL-MCNC: 17 MG/DL
CALCIUM SERPL-MCNC: 9.2 MG/DL
CHLORIDE SERPL-SCNC: 105 MMOL/L
CHOLEST SERPL-MCNC: 165 MG/DL
CHOLEST/HDLC SERPL: 4.9 {RATIO}
CO2 SERPL-SCNC: 25 MMOL/L
CREAT SERPL-MCNC: 1.4 MG/DL
DIFFERENTIAL METHOD: ABNORMAL
EOSINOPHIL # BLD AUTO: 0.5 K/UL
EOSINOPHIL NFR BLD: 4.4 %
ERYTHROCYTE [DISTWIDTH] IN BLOOD BY AUTOMATED COUNT: 14.5 %
EST. GFR  (AFRICAN AMERICAN): >60 ML/MIN/1.73 M^2
EST. GFR  (NON AFRICAN AMERICAN): 54.2 ML/MIN/1.73 M^2
ESTIMATED AVG GLUCOSE: 192 MG/DL
GLUCOSE SERPL-MCNC: 150 MG/DL
HBA1C MFR BLD HPLC: 8.3 %
HCT VFR BLD AUTO: 40.5 %
HDLC SERPL-MCNC: 34 MG/DL
HDLC SERPL: 20.6 %
HGB BLD-MCNC: 13 G/DL
IMM GRANULOCYTES # BLD AUTO: 0.04 K/UL
IMM GRANULOCYTES NFR BLD AUTO: 0.4 %
LDLC SERPL CALC-MCNC: 95 MG/DL
LYMPHOCYTES # BLD AUTO: 3.6 K/UL
LYMPHOCYTES NFR BLD: 34.3 %
MCH RBC QN AUTO: 27 PG
MCHC RBC AUTO-ENTMCNC: 32.1 G/DL
MCV RBC AUTO: 84 FL
MONOCYTES # BLD AUTO: 1 K/UL
MONOCYTES NFR BLD: 9.3 %
NEUTROPHILS # BLD AUTO: 5.3 K/UL
NEUTROPHILS NFR BLD: 51.3 %
NONHDLC SERPL-MCNC: 131 MG/DL
NRBC BLD-RTO: 0 /100 WBC
PLATELET # BLD AUTO: 140 K/UL
PMV BLD AUTO: 13.5 FL
POTASSIUM SERPL-SCNC: 3.8 MMOL/L
PROT SERPL-MCNC: 7.2 G/DL
RBC # BLD AUTO: 4.82 M/UL
SODIUM SERPL-SCNC: 140 MMOL/L
T4 FREE SERPL-MCNC: 0.86 NG/DL
TRIGL SERPL-MCNC: 180 MG/DL
TSH SERPL DL<=0.005 MIU/L-ACNC: 4.46 UIU/ML
WBC # BLD AUTO: 10.36 K/UL

## 2018-03-28 PROCEDURE — 36415 COLL VENOUS BLD VENIPUNCTURE: CPT | Mod: PO

## 2018-03-28 PROCEDURE — 85025 COMPLETE CBC W/AUTO DIFF WBC: CPT

## 2018-03-28 PROCEDURE — 84443 ASSAY THYROID STIM HORMONE: CPT

## 2018-03-28 PROCEDURE — 80053 COMPREHEN METABOLIC PANEL: CPT

## 2018-03-28 PROCEDURE — 83036 HEMOGLOBIN GLYCOSYLATED A1C: CPT

## 2018-03-28 PROCEDURE — 80061 LIPID PANEL: CPT

## 2018-03-28 PROCEDURE — 84439 ASSAY OF FREE THYROXINE: CPT

## 2018-03-28 NOTE — PROGRESS NOTES
Patient, Vick Gonzalez (MRN #[id number]), presented with a recent Platelet count less than 150 K/uL consistent with the definition of thrombocytopenia (ICD10 - D69.6).    Platelets   Date Value Ref Range Status   03/05/2018 128 (L) 150 - 350 K/uL Final     The patient's thrombocytopenia was monitored, evaluated, addressed and/or treated. This addendum to the medical record is made on 03/28/2018.

## 2018-03-28 NOTE — PROGRESS NOTES
Patient, Vick Gonzalez (MRN #[id number]), presented with a recorded BMI of 35.93 kg/m^2 and a documented comorbidity(s):  - Diabetes Mellitus Type 2  - Hyperlipidemia  to which the severe obesity is a contributing factor. This is consistent with the definition of severe obesity (BMI 35.0-35.9) with comorbidity (ICD-10 E66.01, Z68.35). The patient's severe obesity was monitored, evaluated, addressed and/or treated. This addendum to the medical record is made on 03/28/2018.

## 2018-04-24 ENCOUNTER — TELEPHONE (OUTPATIENT)
Dept: TRANSPLANT | Facility: CLINIC | Age: 63
End: 2018-04-24

## 2018-04-24 ENCOUNTER — HOSPITAL ENCOUNTER (EMERGENCY)
Facility: HOSPITAL | Age: 63
Discharge: HOME | End: 2018-04-24
Attending: EMERGENCY MEDICINE
Payer: MEDICARE

## 2018-04-24 VITALS
HEIGHT: 74 IN | RESPIRATION RATE: 18 BRPM | BODY MASS INDEX: 33.37 KG/M2 | DIASTOLIC BLOOD PRESSURE: 87 MMHG | OXYGEN SATURATION: 99 % | HEART RATE: 73 BPM | WEIGHT: 260 LBS | SYSTOLIC BLOOD PRESSURE: 124 MMHG | TEMPERATURE: 99 F

## 2018-04-24 DIAGNOSIS — R52 PAIN: ICD-10-CM

## 2018-04-24 DIAGNOSIS — S80.11XA CONTUSION OF RIGHT LOWER LEG, INITIAL ENCOUNTER: Primary | ICD-10-CM

## 2018-04-24 LAB — POCT GLUCOSE: 137 MG/DL (ref 70–110)

## 2018-04-24 PROCEDURE — 82962 GLUCOSE BLOOD TEST: CPT

## 2018-04-24 PROCEDURE — 25000003 PHARM REV CODE 250: Performed by: NURSE PRACTITIONER

## 2018-04-24 PROCEDURE — 99284 EMERGENCY DEPT VISIT MOD MDM: CPT | Mod: 25

## 2018-04-24 RX ORDER — HYDROCODONE BITARTRATE AND ACETAMINOPHEN 5; 325 MG/1; MG/1
1 TABLET ORAL
Status: COMPLETED | OUTPATIENT
Start: 2018-04-24 | End: 2018-04-24

## 2018-04-24 RX ADMIN — HYDROCODONE BITARTRATE AND ACETAMINOPHEN 1 TABLET: 5; 325 TABLET ORAL at 12:04

## 2018-04-24 NOTE — DISCHARGE INSTRUCTIONS
Please continue to take your prescribed pain medication as labeled and as needed for pain. Use R.I.C.E. And f/u with your PCP within 2-3 days and return to ED if symptoms worsen or change.

## 2018-04-24 NOTE — TELEPHONE ENCOUNTER
----- Message from Evelyn Reyez sent at 4/24/2018 11:20 AM CDT -----  Contact: wife  Calling to speak with coordinator to touch base regarding pt's care    Pt contact [phone]

## 2018-04-24 NOTE — ED NOTES
Pt here c/o right leg pain post hitting himself with hammer. denies fevers; states morning blood glucose=140. Pt is AAOx3,resp are regular and unlabored. Right leg with mild swelling and redness. instructed pt to remove bot socks and shoes for further evaluation. Wife at side.

## 2018-04-24 NOTE — TELEPHONE ENCOUNTER
Spoke with pt's spouse. Discussed that labs needed 5/28/18 and clinic with CT scans will be needed in June. Wife agreed with plan.

## 2018-04-24 NOTE — ED PROVIDER NOTES
"Encounter Date: 4/24/2018       History     Chief Complaint   Patient presents with    Leg Pain     Right leg injury x 2 days ago- states he was swinging a large framing hammer downward, missed the objest he was trying to hit and hit his right lower leg. Swelling and pain noted to right lower outer leg.      Pt is a 62-year-old male with pmhx of anxiety, CKD, non-insulin dependent diabetes, HTN, liver transplant, Hepatitis C, and gout who presents today with R leg pain and swelling after hitting his leg with a hammer 2 days ago. Pt reports that he was "swinging at an object" and accidentally missed hitting his R leg. Pt reports that he has had a previous injury to his R leg. He describes the pain as throbbing. Pt reports that he has been taking his prescribed oxycodone for pain and it has been helping. He states that he is able to bear on it, but that it hurts. Pt denies fever, chills, neck pain/stiffness, weakness, numbness/tinlging, SOB, chest pain, N/V/D, and abdominal pain. Pt denies any other complaints at this time.       The history is provided by the patient.     Review of patient's allergies indicates:  No Known Allergies  Past Medical History:   Diagnosis Date    Anemia     Anxiety     Chronic pain syndrome 7/13/2011    CKD (chronic kidney disease), stage III     Diabetes mellitus type II, uncontrolled     Discitis of lumbosacral region 1/16/2015    ED (erectile dysfunction)     Genital herpes     Gout, arthritis     History of alcohol abuse     History of hepatitis C, s/p successful Rx w/ SVR12 - 2/2018 8/23/2011    Completed 24wks Epclusa + RBV w/SVR12 - 2/2018  -     History of positive PPD, treatment status unknown     Pulmonary granulomas, negative sputum cultures for AFB and indeterminate quantferon test    History of substance abuse     Hypertension     Hypothyroidism     Liver replaced by transplant 8/23/2011    DATE: 12/16/2013  LIVER BIOPSY : REASON:  hep C staging  PATHOLOGY " COMMITTEE NOTE/PLAN: :grade  1 / stage 1        Peptic ulcer disease      Past Surgical History:   Procedure Laterality Date    CHOLECYSTECTOMY      LIVER TRANSPLANT  06/2010    SPINE SURGERY       Family History   Problem Relation Age of Onset    Cancer Mother     Diabetes Mother     Heart disease Mother     Diabetes Sister     Cancer Maternal Uncle 82     colon CA    Melanoma Neg Hx     Psoriasis Neg Hx     Lupus Neg Hx     Eczema Neg Hx     Acne Neg Hx      Social History   Substance Use Topics    Smoking status: Former Smoker    Smokeless tobacco: Never Used    Alcohol use No      Comment: over 5 years ago, none currently     Review of Systems   Constitutional: Negative for chills and fever.   HENT: Negative for sore throat.    Respiratory: Negative for cough and shortness of breath.    Cardiovascular: Negative for chest pain and leg swelling.   Gastrointestinal: Negative for abdominal pain, diarrhea, nausea and vomiting.   Genitourinary: Negative for dysuria.   Musculoskeletal: Positive for joint swelling. Negative for back pain, gait problem, neck pain and neck stiffness.   Skin: Negative for rash.   Neurological: Negative for dizziness, syncope, weakness, numbness and headaches.   Hematological: Does not bruise/bleed easily.       Physical Exam     Initial Vitals [04/24/18 1111]   BP Pulse Resp Temp SpO2   115/84 82 18 97.8 °F (36.6 °C) 98 %      MAP       94.33         Physical Exam    Nursing note and vitals reviewed.  Constitutional: Vital signs are normal. He is not diaphoretic. He is Obese . He is active.  Non-toxic appearance. He does not have a sickly appearance.   HENT:   Head: Normocephalic.   Right Ear: Hearing normal.   Left Ear: Hearing normal.   Nose: Nose normal.   Mouth/Throat: Uvula is midline, oropharynx is clear and moist and mucous membranes are normal.   Eyes: Lids are normal.   Neck: Trachea normal, normal range of motion, full passive range of motion without pain and  phonation normal. Neck supple. No spinous process tenderness and no muscular tenderness present. No neck rigidity.   Cardiovascular: Normal rate, regular rhythm, normal heart sounds and normal pulses.   Pulses:       Dorsalis pedis pulses are 2+ on the right side, and 2+ on the left side.   Pulmonary/Chest: Effort normal and breath sounds normal.   Abdominal: Bowel sounds are normal.   Musculoskeletal:        Right lower leg: He exhibits tenderness and swelling. He exhibits no bony tenderness, no edema, no deformity and no laceration.   TTP to lateral aspect of R leg, minimal swelling noted. Hematoma noted. No redness or warmth. No bony tenderness or deformity. Pt NVI.   Neurological: He is alert and oriented to person, place, and time. He has normal strength. No sensory deficit. Gait normal. GCS eye subscore is 4. GCS verbal subscore is 5. GCS motor subscore is 6.   Skin: Skin is warm, dry and intact. Capillary refill takes less than 2 seconds. No rash noted.   Psychiatric: He has a normal mood and affect.         ED Course   Procedures  Labs Reviewed   POCT GLUCOSE MONITORING CONTINUOUS         Imaging Results          X-Ray Tibia Fibula 2 View Right (Final result)  Result time 04/24/18 12:54:17    Final result by Denys Stroud MD (04/24/18 12:54:17)                 Impression:      As above.      Electronically signed by: Denys Stroud MD  Date:    04/24/2018  Time:    12:54             Narrative:    EXAMINATION:  XR TIBIA FIBULA 2 VIEW RIGHT    CLINICAL HISTORY:  Pain, unspecified    TECHNIQUE:  AP and lateral views of the right tibia and fibula were performed.    COMPARISON:  None.    FINDINGS:  There are no acute fractures or dislocations or osteoblastic or lytic lesions or abnormal periosteal reactions.  Stranding of the subcutaneous fat possibly from edema or cellulitis is noted.                                   Medical Decision Making:   Initial Assessment:   Patient is a 62-year-old male with past medical  history of anxiety, CKG, non-insulin-dependent diabetes, hypertension, liver transplant, hepatitis C, and gout who presents today with right leg pain and swelling after hitting his leg with a hammer 2 days ago. Pt appears well, non-toxic. 2+ DP bilaterally by palpation. Sensation and strength intact bilaterally in lower extremities. Pt NVI.  Differential Diagnosis:   Contusion, sprain/strain, fracture, dislocation  Clinical Tests:   Radiological Study: Ordered and Reviewed  ED Management:  Xray, norco, POCT glucose   Xray is normal. Xray read by radiologist. Pt most likely has a contusion.  Patient is stable and will be DC home.  Patient instructed to continue to take his prescribed pain medication at home as labeled an as needed for pain.  Patient instructed to use R.I.C.E. And to f/u with his PCP within 2-3 days.  Patient instructed to return to ED if symptoms worsen or change.  Patient verbalized discharge instructions and is compliance and agreement with treatment plan.                       Clinical Impression:   The primary encounter diagnosis was Contusion of right lower leg, initial encounter. A diagnosis of Pain was also pertinent to this visit.                           Mc Anaya NP  04/24/18 [id number]

## 2018-05-28 ENCOUNTER — LAB VISIT (OUTPATIENT)
Dept: LAB | Facility: HOSPITAL | Age: 63
End: 2018-05-28
Attending: INTERNAL MEDICINE
Payer: MEDICARE

## 2018-05-28 DIAGNOSIS — Z86.19 HISTORY OF HEPATITIS C: ICD-10-CM

## 2018-05-28 PROCEDURE — 36415 COLL VENOUS BLD VENIPUNCTURE: CPT | Mod: PO

## 2018-05-28 PROCEDURE — 87522 HEPATITIS C REVRS TRNSCRPJ: CPT

## 2018-05-29 ENCOUNTER — OFFICE VISIT (OUTPATIENT)
Dept: PAIN MEDICINE | Facility: CLINIC | Age: 63
End: 2018-05-29
Payer: MEDICARE

## 2018-05-29 ENCOUNTER — TELEPHONE (OUTPATIENT)
Dept: TRANSPLANT | Facility: CLINIC | Age: 63
End: 2018-05-29

## 2018-05-29 VITALS
BODY MASS INDEX: 35.96 KG/M2 | DIASTOLIC BLOOD PRESSURE: 76 MMHG | SYSTOLIC BLOOD PRESSURE: 111 MMHG | HEIGHT: 74 IN | WEIGHT: 280.19 LBS | HEART RATE: 85 BPM | TEMPERATURE: 98 F

## 2018-05-29 DIAGNOSIS — M96.1 POSTLAMINECTOMY SYNDROME OF LUMBAR REGION: Primary | ICD-10-CM

## 2018-05-29 DIAGNOSIS — M54.16 LUMBAR RADICULOPATHY: ICD-10-CM

## 2018-05-29 DIAGNOSIS — G95.9 LUMBAR MYELOPATHY: ICD-10-CM

## 2018-05-29 DIAGNOSIS — Z51.81 ENCOUNTER FOR MONITORING OPIOID MAINTENANCE THERAPY: ICD-10-CM

## 2018-05-29 DIAGNOSIS — G89.4 CHRONIC PAIN SYNDROME: ICD-10-CM

## 2018-05-29 DIAGNOSIS — M43.10 ACQUIRED SPONDYLOLISTHESIS: ICD-10-CM

## 2018-05-29 DIAGNOSIS — Z79.891 ENCOUNTER FOR MONITORING OPIOID MAINTENANCE THERAPY: ICD-10-CM

## 2018-05-29 DIAGNOSIS — M51.36 DDD (DEGENERATIVE DISC DISEASE), LUMBAR: ICD-10-CM

## 2018-05-29 PROCEDURE — 99999 PR PBB SHADOW E&M-EST. PATIENT-LVL III: CPT | Mod: PBBFAC,,, | Performed by: NURSE PRACTITIONER

## 2018-05-29 PROCEDURE — 99213 OFFICE O/P EST LOW 20 MIN: CPT | Mod: PBBFAC | Performed by: NURSE PRACTITIONER

## 2018-05-29 PROCEDURE — 99214 OFFICE O/P EST MOD 30 MIN: CPT | Mod: S$PBB,,, | Performed by: NURSE PRACTITIONER

## 2018-05-29 RX ORDER — OXYCODONE HYDROCHLORIDE 15 MG/1
15 TABLET ORAL 4 TIMES DAILY PRN
Qty: 120 TABLET | Refills: 0 | Status: SHIPPED | OUTPATIENT
Start: 2018-07-27 | End: 2018-08-22 | Stop reason: SDUPTHER

## 2018-05-29 RX ORDER — OXYCODONE HYDROCHLORIDE 15 MG/1
15 TABLET ORAL 4 TIMES DAILY PRN
Qty: 120 TABLET | Refills: 0 | Status: SHIPPED | OUTPATIENT
Start: 2018-05-29 | End: 2018-06-13

## 2018-05-29 RX ORDER — OXYCODONE HYDROCHLORIDE 15 MG/1
15 TABLET ORAL 4 TIMES DAILY PRN
Qty: 120 TABLET | Refills: 0 | Status: SHIPPED | OUTPATIENT
Start: 2018-06-27 | End: 2018-06-13

## 2018-05-29 NOTE — LETTER
May 29, 2018    Vick Gonzalez  [address] [facility] [address]          Dear Vick Gonzalez:  MRN: [id number]    Your lab results were stable.  There are no medicine changes.  Please have your labs drawn again on 8/27/18.      If you cannot have your labs drawn on the scheduled date, it is your responsibility to call the transplant department to reschedule.  To reschedule or make an appointment, please as to speak to or leave a message for my assistant, Portia Lincoln, at [phone].  When leaving a message for Latonia Pearce, or myself, we ask that you leave a brief message regarding your request.    Sincerely,    Kandy Herrera, RN, BSN  Liver Transplant Coordinator  [facility]  [address] [place] [id number] [phone]

## 2018-05-29 NOTE — PROGRESS NOTES
Chronic patient Established Note (Follow up visit)      SUBJECTIVE:    Vick Gonzalez presents to the clinic for a follow-up appointment for lower back pain and medication refill. He continues to report low back pain that intermittently radiates down the back of his legs to his feet. He reports that his low back pain is more bothersome than his legs. He describes this pain as constant and aching in nature. This pain is worse with prolonged walking. He does endorse morning stiffness. He has had multiple procedures in the past with limited relief. He is not interested in further procedures at this time. He continues to take Roxicodone 15 mg QID PRN with benefit and without adverse effects. He also takes Gabapentin with benefit. He denies any bowel or bladder incontinence. His pain today is 7/10.      Of note, the patient has a history of previous liver transplant and is monitored by hepatology.  His LFTs are chronically elevated which is believed to be due to HCV.    Pain Disability Index Review:  Last 3 PDI Scores 5/29/2018 11/30/2017 5/31/2017   Pain Disability Index (PDI) 27 32 27       Pain Medications:    - Opioids: Roxicodone (Oxycodone/Acetaminophen) 15 mg QID PRN pain    Others: Gabapentin 2400 mg daily    Opioid Contract: yes     report:  Reviewed and consistent with medication use as prescribed.    Pain Procedures:   1/15/15 Right TFESI @ L5 & S1     Physical Therapy/Home Exercise: no    Imaging:   Lumbar MRI 6/16/15  Narrative   Technique: Routine lumbar spine MRI performed without contrast.    Comparison: MRI 06/16/2015, CT 06/15/2015.    Findings:    There are postsurgical changes consistent with L5-S1 posterior instrumented fusion with bilateral pedicular screws, vertical stabilizing rods and an intervertebral disk spacer.  When compared to the MRI dated 06/16/2015 00:33, there are new postsurgical   changes consistent with left L5 hemilaminectomy.  There is a 2.5 cm ill-defined fluid collection at  the site of hemilaminectomy that is not well evaluated due to the lack of IV contrast but appears to extend anteriorly into the spinal canal and into the   left foraminal zone.  There is as possible small fluid collection within the anterior right epidural space that may also due to compression of the adjacent spinal canal.  There is stable grade I anterolistheses of L5 on S1 with persistent high signal   intensity adjacent to the left lateral aspect of the disk spacer that demonstrates moderate associated bone marrow edema of the adjacent vertebral endplates, findings that are when compared to the previous exam.  Note is made that there is an apparent   high signal intensity within the nerve roots posterior to the L5-S1 level that was not definitely present on the previous exam.    There is mild persistent height loss loss involving the L5 vertebral body, likely degenerative in nature.  Remaining vertebral body heights are well-maintained without findings to suggest acute fracture.    There is mild intervertebral disk spacing and desiccation at L4-L5 with a small circumferential disk bulge. No disk protrusion or extrusion. There is moderate bilateral facet arthropathy and mild bilateral uncomfortable and buckling at this level.  These   findings contribute to mild spinal canal stenosis and mild bilateral foraminal narrowing.    Noting aforementioned postsurgical changes, there is persistent severe bilateral foraminal narrowing that is difficult to accurately characterize due to adjacent artifact.    There is edema anterior to the L4, L5 and S1 vertebral bodies, presumably postsurgical in nature.  No drainable fluid collections identified. Visualized retroperitoneal organs are unremarkable.   Impression       Postsurgical changes of L5-S1 posterior spinal fusion and left L5 hemilaminectomy. There is an ill-defined fluid collection at the site of hemilaminectomy that is not well evaluated due to the lack of IV  contrast but appears to extend anteriorly with   suspected resulting mass effect on the spinal canal and the left foraminal zone.  There is a suspected small fluid collection within the anterior right epidural space that may contribute to additional mass effect on the adjacent spinal canal. While   findings may represent sequela of expected postsurgical changes, continued follow-up is advised to ensure improvement and/or resolution.    Persistent abnormal high signal intensity adjacent to the left lateral aspect of the intervertebral disk spacer with moderate associated bone marrow edema of the adjacent vertebral endplates. Findings are similar when compared to the previous exam could   represent postsurgical changes. Early infection could have a similar appearance and is possible. Continued follow-up is advised.    Apparent high signal intensity within the nerve roots posterior to the L5-S1 level that was not definitely present on the previous exam. Findings may be artifactual in nature however, sequela of nerve root inflammation/edema is possible noting recent   surgery.    This report has been flagged in the Epic medical record     .  CMP  Sodium   Date Value Ref Range Status   05/28/2018 135 (L) 136 - 145 mmol/L Final     Potassium   Date Value Ref Range Status   05/28/2018 4.4 3.5 - 5.1 mmol/L Final     Chloride   Date Value Ref Range Status   05/28/2018 102 95 - 110 mmol/L Final     CO2   Date Value Ref Range Status   05/28/2018 23 23 - 29 mmol/L Final     Glucose   Date Value Ref Range Status   05/28/2018 297 (H) 70 - 110 mg/dL Final     BUN, Bld   Date Value Ref Range Status   05/28/2018 13 6 - 20 mg/dL Final     Creatinine   Date Value Ref Range Status   05/28/2018 1.3 0.5 - 1.4 mg/dL Final     Calcium   Date Value Ref Range Status   05/28/2018 10.0 8.7 - 10.5 mg/dL Final     Total Protein   Date Value Ref Range Status   05/28/2018 7.7 6.0 - 8.4 g/dL Final     Albumin   Date Value Ref Range Status  "  05/28/2018 3.7 3.5 - 5.2 g/dL Final     Total Bilirubin   Date Value Ref Range Status   05/28/2018 0.4 0.1 - 1.0 mg/dL Final     Comment:     For infants and newborns, interpretation of results should be based  on gestational age, weight and in agreement with clinical  observations.  Premature Infant recommended reference ranges:  Up to 24 hours.............<8.0 mg/dL  Up to 48 hours............<12.0 mg/dL  3-5 days..................<15.0 mg/dL  6-29 days.................<15.0 mg/dL       Alkaline Phosphatase   Date Value Ref Range Status   05/28/2018 110 55 - 135 U/L Final     AST   Date Value Ref Range Status   05/28/2018 17 10 - 40 U/L Final     ALT   Date Value Ref Range Status   05/28/2018 27 10 - 44 U/L Final     Anion Gap   Date Value Ref Range Status   05/28/2018 10 8 - 16 mmol/L Final     eGFR if    Date Value Ref Range Status   05/28/2018 >60.0 >60 mL/min/1.73 m^2 Final     eGFR if non    Date Value Ref Range Status   05/28/2018 59.3 (A) >60 mL/min/1.73 m^2 Final     Comment:     Calculation used to obtain the estimated glomerular filtration  rate (eGFR) is the CKD-EPI equation.            Allergies:   Review of patient's allergies indicates:   Allergen Reactions    Naproxen      Other reaction(s): problems w/liver/kid    Oxaprozin      Other reaction(s): cause problems w/kid/liver       Current Medications:   Current Outpatient Prescriptions   Medication Sig Dispense Refill    allopurinol (ZYLOPRIM) 100 MG tablet Take 1 tablet (100 mg total) by mouth 2 (two) times daily. 60 tablet 11    amitriptyline (ELAVIL) 25 MG tablet TAKE 1 TABLET (25 MG TOTAL) BY MOUTH EVERY EVENING. FOR NERVE PAIN AND SLEEP 30 tablet 5    BD ULTRA-FINE MENDY PEN NEEDLES 32 gauge x 5/32" Ndle       blood sugar diagnostic Strp 1 strip by Misc.(Non-Drug; Combo Route) route 4 (four) times daily before meals and nightly. 100 strip 11    blood-glucose meter Misc 1 each by Misc.(Non-Drug; Combo " "Route) route 4 (four) times daily before meals and nightly. 1 each 0    calcium carbonate-vitamin D3 (CALTRATE 600 + D) 600 mg(1,500mg) -400 unit Chew       colchicine 0.6 mg tablet Take 1 tablet (0.6 mg total) by mouth 2 (two) times daily. 30 tablet 2    famotidine (PEPCID) 40 MG tablet Take 1 tablet (40 mg total) by mouth once daily. 30 tablet 2    gabapentin (NEURONTIN) 800 MG tablet Take 1 tablet (800 mg total) by mouth 3 (three) times daily. 90 tablet 11    insulin aspart (NOVOLOG) 100 unit/mL InPn pen Inject 12 Units into the skin 3 (three) times daily with meals. 10.8 mL 11    insulin detemir (LEVEMIR FLEXTOUCH) 100 unit/mL (3 mL) SubQ InPn pen Inject 60 Units into the skin every evening. 15 mL 11    lancets Misc 1 each by Misc.(Non-Drug; Combo Route) route 4 (four) times daily before meals and nightly. 200 each 11    levothyroxine (SYNTHROID) 88 MCG tablet TAKE 1 TABLET (88 MCG TOTAL) BY MOUTH ONCE DAILY. 30 tablet 11    lisinopril (PRINIVIL,ZESTRIL) 20 MG tablet Take 1 tablet (20 mg total) by mouth once daily. 90 tablet 3    metoprolol succinate (TOPROL-XL) 200 MG 24 hr tablet Take 1 tablet (200 mg total) by mouth once daily. 30 tablet 11    multivitamin (THERAGRAN) per tablet Take 1 tablet by mouth.      NIFEdipine (ADALAT CC) 60 MG TbSR TAKE 1 TABLET (60 MG TOTAL) BY MOUTH ONCE DAILY. 90 tablet 3    NOVOFINE PLUS 32 gauge x 1/6" Ndle       pen needle, diabetic (BD ULTRA-FINE MENDY PEN NEEDLE) 32 gauge x 5/32" Ndle TEST WITH NOVOLOG THREE TIMES A DAY WITH MEALS AND WITH LEVEMIR AT BEDTIME 100 each 11    sertraline (ZOLOFT) 100 MG tablet TAKE 1 TABLET (100 MG TOTAL) BY MOUTH ONCE DAILY. 30 tablet 7    sildenafil (REVATIO) 20 mg Tab Take 5 po 1 hour before sexual activity 50 tablet 11    tacrolimus (PROGRAF) 1 MG Cap Take 2mg in AM and 1mg in PM, PO. Liver Transplant Z94.4 90 capsule 11     No current facility-administered medications for this visit.        REVIEW OF SYSTEMS:    GENERAL:  No " weight loss, malaise or fevers.  HEENT:  Negative for frequent or significant headaches.  NECK:  Negative for lumps, goiter, pain and significant neck swelling.  RESPIRATORY:  Negative for cough, wheezing or shortness of breath.  CARDIOVASCULAR:  Negative for chest pain, leg swelling or palpitations. H/O hypertension.  GI:  Negative for abdominal discomfort, blood in stools or black stools or change in bowel habits.  MUSCULOSKELETAL:  See HPI.  SKIN:  Negative for lesions, rash, and itching.  PSYCH:  Negative for sleep disturbance, mood disorder and recent psychosocial stressors.  HEMATOLOGY/LYMPHOLOGY:  Negative for prolonged bleeding, bruising easily or swollen nodes. H/O liver transplant.  NEURO:   No history of headaches, syncope, paralysis, seizures or tremors.  ENDO: Diabetes.   All other reviewed and negative other than HPI.    Past Medical History:  Past Medical History:   Diagnosis Date    Anemia     Anxiety     Chronic pain syndrome 7/13/2011    CKD (chronic kidney disease), stage III     Diabetes mellitus type II, uncontrolled     Discitis of lumbosacral region 1/16/2015    ED (erectile dysfunction)     Genital herpes     Gout, arthritis     History of alcohol abuse     History of hepatitis C, s/p successful Rx w/ SVR12 - 2/2018 8/23/2011    Completed 24wks Epclusa + RBV w/SVR12 - 2/2018  -     History of positive PPD, treatment status unknown     Pulmonary granulomas, negative sputum cultures for AFB and indeterminate quantferon test    History of substance abuse     Hypertension     Hypothyroidism     Liver replaced by transplant 8/23/2011    DATE: 12/16/2013  LIVER BIOPSY : REASON:  hep C staging  PATHOLOGY COMMITTEE NOTE/PLAN: :grade  1 / stage 1        Peptic ulcer disease        Past Surgical History:  Past Surgical History:   Procedure Laterality Date    CHOLECYSTECTOMY      LIVER TRANSPLANT  06/2010    SPINE SURGERY         Family History:  Family History   Problem Relation Age  "of Onset    Cancer Mother     Diabetes Mother     Heart disease Mother     Diabetes Sister     Cancer Maternal Uncle 82        colon CA    Melanoma Neg Hx     Psoriasis Neg Hx     Lupus Neg Hx     Eczema Neg Hx     Acne Neg Hx        Social History:  Social History     Social History    Marital status:      Spouse name: N/A    Number of children: N/A    Years of education: N/A     Social History Main Topics    Smoking status: Former Smoker    Smokeless tobacco: Never Used    Alcohol use No      Comment: over 5 years ago, none currently    Drug use: No    Sexual activity: Not Asked     Other Topics Concern    None     Social History Narrative    None       OBJECTIVE:    /76   Pulse 85   Temp 98.3 °F (36.8 °C)   Ht 6' 2" (1.88 m)   Wt 127.1 kg (280 lb 3.3 oz)   BMI 35.98 kg/m²     PHYSICAL EXAMINATION:    General appearance: Well appearing, in no acute distress, alert and oriented x3.  Psych:  Mood and affect appropriate.  Skin: Skin color, texture, turgor normal, no rashes or lesions, in both upper and lower body.  Head/face:  Atraumatic, normocephalic. No palpable lymph nodes.  GI: Abdomen soft and non-tender.  Back: Straight leg raising in the sitting position is negative to radicular pain. Limited lumbar flexion and extension with pain.  There is pain with palpation over lumbar spine.  Positive facet loading bilaterally.   Extremities: Peripheral joint ROM is full and pain free without obvious instability or laxity in all four extremities. There is pain with palpation over bilateral SI joints. FABERs is negative bilaterally. No deformities or skin discoloration. Good capillary refill.   Musculoskeletal:  Right plantar flexion 4/5.  All other LE strength 5/5 and symmetric.  No atrophy or tone abnormalities are noted.  Neuro: Bilateral upper and lower extremity coordination and muscle stretch reflexes are physiologic and symmetric.  Plantar response are downgoing.  Decreased " sensation to anterior aspect of right foot.   Gait: Antalgic- ambulates with cane.    ASSESSMENT: 62 y.o. year old male with lower back and right leg pain, consistent with the following diagnoses:     1. Postlaminectomy syndrome of lumbar region  oxyCODONE (ROXICODONE) 15 MG Tab    oxyCODONE (ROXICODONE) 15 MG Tab    oxyCODONE (ROXICODONE) 15 MG Tab   2. Chronic pain syndrome  oxyCODONE (ROXICODONE) 15 MG Tab    oxyCODONE (ROXICODONE) 15 MG Tab    oxyCODONE (ROXICODONE) 15 MG Tab   3. Lumbar radiculopathy  oxyCODONE (ROXICODONE) 15 MG Tab    oxyCODONE (ROXICODONE) 15 MG Tab    oxyCODONE (ROXICODONE) 15 MG Tab   4. Acquired spondylolisthesis  oxyCODONE (ROXICODONE) 15 MG Tab    oxyCODONE (ROXICODONE) 15 MG Tab    oxyCODONE (ROXICODONE) 15 MG Tab   5. DDD (degenerative disc disease), lumbar  oxyCODONE (ROXICODONE) 15 MG Tab    oxyCODONE (ROXICODONE) 15 MG Tab    oxyCODONE (ROXICODONE) 15 MG Tab   6. Lumbar myelopathy  oxyCODONE (ROXICODONE) 15 MG Tab    oxyCODONE (ROXICODONE) 15 MG Tab    oxyCODONE (ROXICODONE) 15 MG Tab   7. Encounter for monitoring opioid maintenance therapy  oxyCODONE (ROXICODONE) 15 MG Tab    oxyCODONE (ROXICODONE) 15 MG Tab    oxyCODONE (ROXICODONE) 15 MG Tab    Pain Clinic Drug Screen         PLAN:     - Previous imaging was reviewed and discussed with the patient today. Recent labs reviewed with patient today.     - Patient can continue Roxicodone 15 mg QID PRN pain, #120. 3 months of prescriptions with appropriate dates was supplied today.     - The [place] Board of Pharmacy website for prescription monitoring was consulted today, and it does not suggest any deviations in conflict with the patient's controlled substance contract with our clinic. Will continue current therapy with frequent monitoring of the controlled substance database, and urine drug screens on followup. Refill approved.  Medication management by Dr. Penn.    - Continue Gabapentin 800 mg TID.     - UDS from 11/30/2017  reviewed and consistent. UDS next visit.     - The patient will continue a home exercise routine to help with pain and strengthening.      - RTC in 3 months or sooner if needed.    - Counseled patient regarding the importance of constant sleeping habits and physical therapy.     - Dr. Penn was consulted on the patient and agrees with this plan.    The above plan and management options were discussed at length with patient. Patient is in agreement with the above and verbalized understanding.    Emma Batista NP  05/29/2018

## 2018-05-29 NOTE — TELEPHONE ENCOUNTER
Letter sent, labs stable and no medication changes are needed. Repeat labs due 8/27/18 per protocol.

## 2018-05-30 LAB
HCV LOG: <1.08 LOG (10) IU/ML
HCV RNA QUANT PCR: <12 IU/ML
HCV, QUALITATIVE: NOT DETECTED IU/ML

## 2018-05-31 ENCOUNTER — TELEPHONE (OUTPATIENT)
Dept: TRANSPLANT | Facility: CLINIC | Age: 63
End: 2018-05-31

## 2018-05-31 NOTE — TELEPHONE ENCOUNTER
----- Message from Jennifer B. Scheuermann, PA sent at 5/31/2018  8:02 AM CDT -----  Virus negative 24 weeks after ending HCV therapy - indicates SVR24  Pt was notified via letter     All lab monitoring now deferred to transplant clinic

## 2018-06-05 ENCOUNTER — TELEPHONE (OUTPATIENT)
Dept: FAMILY MEDICINE | Facility: CLINIC | Age: 63
End: 2018-06-05

## 2018-06-05 NOTE — TELEPHONE ENCOUNTER
----- Message from Simona Aburto sent at 6/5/2018  7:43 AM CDT -----  Contact: [phone]  Patient called in requesting to speak with you. Did not specify why. Please advise.

## 2018-06-05 NOTE — TELEPHONE ENCOUNTER
Spoke with patient's wife, Cely.  Patient was asking for an appointment with Dr Guerra in Urology at [facility].  Scheduled patient.  Patient's wife verbalized understanding.

## 2018-06-11 ENCOUNTER — OFFICE VISIT (OUTPATIENT)
Dept: UROLOGY | Facility: CLINIC | Age: 63
End: 2018-06-11
Payer: MEDICARE

## 2018-06-11 VITALS
WEIGHT: 280 LBS | DIASTOLIC BLOOD PRESSURE: 79 MMHG | HEART RATE: 83 BPM | SYSTOLIC BLOOD PRESSURE: 128 MMHG | BODY MASS INDEX: 35.95 KG/M2

## 2018-06-11 DIAGNOSIS — N52.9 ERECTILE DYSFUNCTION, UNSPECIFIED ERECTILE DYSFUNCTION TYPE: Primary | ICD-10-CM

## 2018-06-11 PROCEDURE — 99999 PR PBB SHADOW E&M-EST. PATIENT-LVL IV: CPT | Mod: PBBFAC,,, | Performed by: PHYSICIAN ASSISTANT

## 2018-06-11 PROCEDURE — 99213 OFFICE O/P EST LOW 20 MIN: CPT | Mod: S$PBB,,, | Performed by: PHYSICIAN ASSISTANT

## 2018-06-11 PROCEDURE — 99214 OFFICE O/P EST MOD 30 MIN: CPT | Mod: PBBFAC | Performed by: PHYSICIAN ASSISTANT

## 2018-06-11 NOTE — PROGRESS NOTES
"CHIEF COMPLAINT:    Mr. Gonzalez is a 62 y.o. male presenting for ED follow up.  PRESENTING ILLNESS:    Vick Gonzalez is a 62 y.o. male with a PMH of ED, liver transplant who presents for ED follow up.  He was last seen in clinic by Dr. Guerra and was started on 100mg of sildenafil.  It does not work well.  Sometimes he is able to achieve an erection that allows for penetration but he is unable to maintain it.   He tried cialis 20mg from his cousin.  "It came up but went back down."    He denies a smoking history but endorses HTN, and DM.  His testosterone in 2011 was 403.     She does not have any urinary complaints.  He denies dysuria and hesitancy.  He experiences urinary frequency when his blood sugar is high.  His left testicle hurts when touched.        REVIEW OF SYSTEMS:    Constitutional: Negative for fever and chills.   HENT: Negative for hearing loss.   Eyes: Negative for visual disturbance.   Respiratory: Negative for shortness of breath.   Cardiovascular: Negative for chest pain.   Gastrointestinal: Negative for vomiting, and constipation.   Genitourinary: See HPI  Neurological: Negative for dizziness.   Hematological: Does not bruise/bleed easily.   Psychiatric/Behavioral: Negative for confusion.       PATIENT HISTORY:    Past Medical History:   Diagnosis Date    Anemia     Anxiety     Chronic pain syndrome 7/13/2011    CKD (chronic kidney disease), stage III     Diabetes mellitus type II, uncontrolled     Discitis of lumbosacral region 1/16/2015    ED (erectile dysfunction)     Genital herpes     Gout, arthritis     History of alcohol abuse     History of hepatitis C, s/p successful Rx w/ SVR24 (cure) - 5/2018 8/23/2011    Completed 24wks Epclusa + RBV w/SVR12 - 2/2018  -     History of positive PPD, treatment status unknown     Pulmonary granulomas, negative sputum cultures for AFB and indeterminate quantferon test    History of substance abuse     Hypertension     Hypothyroidism     " "Liver replaced by transplant 8/23/2011    DATE: 12/16/2013  LIVER BIOPSY : REASON:  hep C staging  PATHOLOGY COMMITTEE NOTE/PLAN: :grade  1 / stage 1        Peptic ulcer disease        Past Surgical History:   Procedure Laterality Date    CHOLECYSTECTOMY      LIVER TRANSPLANT  06/2010    SPINE SURGERY         Family History   Problem Relation Age of Onset    Cancer Mother     Diabetes Mother     Heart disease Mother     Diabetes Sister     Cancer Maternal Uncle 82        colon CA    Melanoma Neg Hx     Psoriasis Neg Hx     Lupus Neg Hx     Eczema Neg Hx     Acne Neg Hx        Social History     Social History    Marital status:      Spouse name: N/A    Number of children: N/A    Years of education: N/A     Occupational History    Not on file.     Social History Main Topics    Smoking status: Former Smoker    Smokeless tobacco: Never Used    Alcohol use No      Comment: over 5 years ago, none currently    Drug use: No    Sexual activity: Not on file     Other Topics Concern    Not on file     Social History Narrative    No narrative on file       Allergies:  Patient has no known allergies.    Medications:    Current Outpatient Prescriptions:     allopurinol (ZYLOPRIM) 100 MG tablet, Take 1 tablet (100 mg total) by mouth 2 (two) times daily., Disp: 60 tablet, Rfl: 11    amitriptyline (ELAVIL) 25 MG tablet, TAKE 1 TABLET (25 MG TOTAL) BY MOUTH EVERY EVENING. FOR NERVE PAIN AND SLEEP, Disp: 30 tablet, Rfl: 5    BD ULTRA-FINE MENDY PEN NEEDLES 32 gauge x 5/32" Ndle, , Disp: , Rfl:     blood sugar diagnostic Strp, 1 strip by Misc.(Non-Drug; Combo Route) route 4 (four) times daily before meals and nightly., Disp: 100 strip, Rfl: 11    blood-glucose meter Misc, 1 each by Misc.(Non-Drug; Combo Route) route 4 (four) times daily before meals and nightly., Disp: 1 each, Rfl: 0    calcium carbonate-vitamin D3 (CALTRATE 600 + D) 600 mg(1,500mg) -400 unit Chew, , Disp: , Rfl:     colchicine " "0.6 mg tablet, Take 1 tablet (0.6 mg total) by mouth 2 (two) times daily., Disp: 30 tablet, Rfl: 2    famotidine (PEPCID) 40 MG tablet, Take 1 tablet (40 mg total) by mouth once daily., Disp: 30 tablet, Rfl: 2    gabapentin (NEURONTIN) 800 MG tablet, Take 1 tablet (800 mg total) by mouth 3 (three) times daily., Disp: 90 tablet, Rfl: 11    insulin aspart U-100 (NOVOLOG) 100 unit/mL InPn pen, Inject 12 Units into the skin 3 (three) times daily with meals., Disp: 15 mL, Rfl: 11    insulin detemir (LEVEMIR FLEXTOUCH) 100 unit/mL (3 mL) SubQ InPn pen, Inject 60 Units into the skin every evening., Disp: 15 mL, Rfl: 11    lancets Misc, 1 each by Misc.(Non-Drug; Combo Route) route 4 (four) times daily before meals and nightly., Disp: 200 each, Rfl: 11    levothyroxine (SYNTHROID) 88 MCG tablet, TAKE 1 TABLET (88 MCG TOTAL) BY MOUTH ONCE DAILY., Disp: 30 tablet, Rfl: 11    lisinopril (PRINIVIL,ZESTRIL) 20 MG tablet, Take 1 tablet (20 mg total) by mouth once daily., Disp: 90 tablet, Rfl: 3    metoprolol succinate (TOPROL-XL) 200 MG 24 hr tablet, Take 1 tablet (200 mg total) by mouth once daily., Disp: 30 tablet, Rfl: 11    multivitamin (THERAGRAN) per tablet, Take 1 tablet by mouth., Disp: , Rfl:     NIFEdipine (ADALAT CC) 60 MG TbSR, TAKE 1 TABLET (60 MG TOTAL) BY MOUTH ONCE DAILY., Disp: 90 tablet, Rfl: 3    NOVOFINE PLUS 32 gauge x 1/6" Ndle, , Disp: , Rfl:     oxyCODONE (ROXICODONE) 15 MG Tab, Take 1 tablet (15 mg total) by mouth 4 (four) times daily as needed for Pain., Disp: 120 tablet, Rfl: 0    [START ON 6/27/2018] oxyCODONE (ROXICODONE) 15 MG Tab, Take 1 tablet (15 mg total) by mouth 4 (four) times daily as needed for Pain., Disp: 120 tablet, Rfl: 0    [START ON 7/27/2018] oxyCODONE (ROXICODONE) 15 MG Tab, Take 1 tablet (15 mg total) by mouth 4 (four) times daily as needed for Pain., Disp: 120 tablet, Rfl: 0    pen needle, diabetic (BD ULTRA-FINE MENDY PEN NEEDLE) 32 gauge x 5/32" Ndle, TEST WITH " NOVOLOG THREE TIMES A DAY WITH MEALS AND WITH LEVEMIR AT BEDTIME, Disp: 100 each, Rfl: 11    sertraline (ZOLOFT) 100 MG tablet, TAKE 1 TABLET (100 MG TOTAL) BY MOUTH ONCE DAILY., Disp: 30 tablet, Rfl: 7    sildenafil (REVATIO) 20 mg Tab, Take 5 po 1 hour before sexual activity, Disp: 50 tablet, Rfl: 11    tacrolimus (PROGRAF) 1 MG Cap, TAKE 2 CAPSULES ( 2 MG TOTAL) BY MOUTH IN THE MORNING & TAKE 1 CAPSULE ( 1 MG TOTAL) IN THE EVENING, Disp: 90 capsule, Rfl: 11    PHYSICAL EXAMINATION:    Constitutional: He appears well-developed and well-nourished.  He is in no apparent distress.    Eyes: No scleral icterus noted bilaterally. No discharge bilaterally.    Nose: No rhinorrhea    Cardiovascular: Normal rate.  No pitting edema noted in lower extremities bilaterally    Pulmonary/Chest: Effort normal. No respiratory distress.     Abdominal:  He exhibits no distension.  There is no CVA tenderness.     Lymphadenopathy:        Right: No supraclavicular adenopathy present.        Left: No supraclavicular adenopathy present.     Neurological: He is alert and oriented to person, place, and time.     Skin: Skin is warm and dry.     Psych: Cooperative with normal affect.    Genitourinary: The right testicle is normal in size and nontender to palpation.  The Left testicle is tender to palpation.  No left testicular swelling noted.  No testicular masses noted bilaterally.      Physical Exam      LABS:    Lab Results   Component Value Date    PSA 0.30 09/06/2016    PSA 0.80 07/13/2012    PSA 0.53 05/12/2011    PSADIAG 0.28 10/09/2017       IMPRESSION:    Encounter Diagnoses   Name Primary?    Erectile dysfunction, unspecified erectile dysfunction type Yes         PLAN:  Will check testosterone.  Will call with results.    Discussed other ED options. Patient states that all other options are too expensive.  He gets viagra for $1 which is affordable for him.  He can not afford the other ED medications.     PSA and BERNARDO due in  October  Discussed left testicular pain.  Patient states that he is unable to take tylenol or ibuprofen due to his liver transplant.  He states he takes roxycodone for pain but not for testicular pain.  He was informed that I can not prescribe him roxycodone for his testicular pain.  Patient voiced understanding.  Recommend that he wears underwear with good scrotal support as he does not wear any underwear.      Follow up based on labs.        Rachel Blackwell PA-C

## 2018-06-12 ENCOUNTER — LAB VISIT (OUTPATIENT)
Dept: LAB | Facility: HOSPITAL | Age: 63
End: 2018-06-12
Attending: FAMILY MEDICINE
Payer: MEDICARE

## 2018-06-12 DIAGNOSIS — N52.9 ERECTILE DYSFUNCTION, UNSPECIFIED ERECTILE DYSFUNCTION TYPE: ICD-10-CM

## 2018-06-12 LAB — TESTOST SERPL-MCNC: 143 NG/DL

## 2018-06-12 PROCEDURE — 36415 COLL VENOUS BLD VENIPUNCTURE: CPT | Mod: PO

## 2018-06-12 PROCEDURE — 84403 ASSAY OF TOTAL TESTOSTERONE: CPT

## 2018-06-13 ENCOUNTER — OFFICE VISIT (OUTPATIENT)
Dept: TRANSPLANT | Facility: CLINIC | Age: 63
End: 2018-06-13
Payer: MEDICARE

## 2018-06-13 ENCOUNTER — HOSPITAL ENCOUNTER (OUTPATIENT)
Dept: RADIOLOGY | Facility: HOSPITAL | Age: 63
Discharge: HOME | End: 2018-06-13
Attending: INTERNAL MEDICINE
Payer: MEDICARE

## 2018-06-13 VITALS
HEIGHT: 73 IN | HEART RATE: 84 BPM | TEMPERATURE: 97 F | BODY MASS INDEX: 37.55 KG/M2 | WEIGHT: 283.31 LBS | DIASTOLIC BLOOD PRESSURE: 78 MMHG | OXYGEN SATURATION: 97 % | RESPIRATION RATE: 16 BRPM | SYSTOLIC BLOOD PRESSURE: 135 MMHG

## 2018-06-13 DIAGNOSIS — D84.9 IMMUNOSUPPRESSION: ICD-10-CM

## 2018-06-13 DIAGNOSIS — R79.89 LOW TESTOSTERONE: Primary | ICD-10-CM

## 2018-06-13 DIAGNOSIS — K74.69 OTHER CIRRHOSIS OF LIVER: ICD-10-CM

## 2018-06-13 DIAGNOSIS — Z94.4 STATUS POST LIVER TRANSPLANT: Primary | ICD-10-CM

## 2018-06-13 DIAGNOSIS — Z94.4 LIVER TRANSPLANTED: ICD-10-CM

## 2018-06-13 DIAGNOSIS — Z86.19 HISTORY OF HEPATITIS C: ICD-10-CM

## 2018-06-13 PROCEDURE — 93975 VASCULAR STUDY: CPT | Mod: TC

## 2018-06-13 PROCEDURE — 93975 VASCULAR STUDY: CPT | Mod: 26,,, | Performed by: RADIOLOGY

## 2018-06-13 PROCEDURE — 99213 OFFICE O/P EST LOW 20 MIN: CPT | Mod: PBBFAC,25 | Performed by: INTERNAL MEDICINE

## 2018-06-13 PROCEDURE — 99999 PR PBB SHADOW E&M-EST. PATIENT-LVL III: CPT | Mod: PBBFAC,,, | Performed by: INTERNAL MEDICINE

## 2018-06-13 PROCEDURE — 76705 ECHO EXAM OF ABDOMEN: CPT | Mod: 59,TC

## 2018-06-13 PROCEDURE — 99214 OFFICE O/P EST MOD 30 MIN: CPT | Mod: S$PBB,,, | Performed by: INTERNAL MEDICINE

## 2018-06-13 PROCEDURE — 76705 ECHO EXAM OF ABDOMEN: CPT | Mod: 26,XS,, | Performed by: RADIOLOGY

## 2018-06-13 NOTE — LETTER
June 13, 2018        Emanuel Lopez  [address]  [address]  [place] [address]  Phone: [phone]  Fax: [phone]             [facility]  [address]  [facility] [address]  Phone: [phone]   Patient: Vick Gonzalez   MR Number: [id number]   YOB: 1955   Date of Visit: 6/13/2018       Dear Dr. Emanuel Lopez    Thank you for referring Vick Gonzalze to me for evaluation. Attached you will find relevant portions of my assessment and plan of care.    If you have questions, please do not hesitate to call me. I look forward to following Vick Gonzalez along with you.    Sincerely,    James Coleman MD    Enclosure    If you would like to receive this communication electronically, please contact [email or web address] or [phone] to request [employer] access.    [employer] Link is a tool which provides read-only access to select patient information with whom you have a relationship. Its easy to use and provides real time access to review your patients record including encounter summaries, notes, results, and demographic information.    If you feel you have received this communication in error or would no longer like to receive these types of communications, please e-mail [email or web address]

## 2018-06-13 NOTE — PROGRESS NOTES
Subjective:       Patient ID: Vick Gonzalez is a 62 y.o. male.    Chief Complaint: Liver Transplant Follow-up    HPI  I saw Vick Gonzalez today in the Post-Liver Transplant Clinic. This 62-year-old  gentleman is now 8 years post liver transplant for end-stage liver disease due to hepatitis C. He has been doing well post transplant. He feels well with no symptoms of advanced chronic liver   Disease.    His last liver bx in Dec 2013 showed stage 1 fibrosis only.  Fibroscan unsuccessful but fibrosure showed F4.    He is maintained on tacrolimus 2/1 mg daily..    Treated for HCV- ledip/sof and ribavirin but relapsed.  SVR with Epclusa + riba for  24 weeks.    LFTs- normal  Creatinine 1.3    BP managed by PCP- 135/78 mm Hg today    Abdo US: today- result awaited      Lab Results   Component Value Date    ALT 27 05/28/2018    AST 17 05/28/2018     (H) 04/24/2017    ALKPHOS 110 05/28/2018    BILITOT 0.4 05/28/2018       Review of Systems   Constitutional: Negative for activity change, appetite change, fatigue, fever and unexpected weight change.   HENT: Negative.  Negative for ear pain, hearing loss, sinus pressure and tinnitus.    Eyes: Negative.  Negative for photophobia, discharge, itching and visual disturbance.   Respiratory: Negative.  Negative for cough, chest tightness and shortness of breath.    Cardiovascular: Negative for chest pain, palpitations and leg swelling.   Gastrointestinal: Negative for constipation, diarrhea, nausea and vomiting.   Genitourinary: Negative for decreased urine volume, difficulty urinating, dysuria, frequency (nocturia x 2) and urgency.   Musculoskeletal: Negative.  Negative for arthralgias, back pain, gait problem, joint swelling and myalgias.   Skin: Negative.    Neurological: Negative for dizziness, seizures, numbness and headaches.   Hematological: Negative for adenopathy. Does not bruise/bleed easily.   Psychiatric/Behavioral: Negative for dysphoric mood and  sleep disturbance. The patient is not nervous/anxious.          Objective:      Physical Exam   Constitutional: He is oriented to person, place, and time. He appears well-developed and well-nourished.   HENT:   Head: Normocephalic and atraumatic.   Eyes: Conjunctivae are normal. Pupils are equal, round, and reactive to light.   Neck: Normal range of motion. No thyromegaly present.   Cardiovascular: Normal rate, regular rhythm and normal heart sounds.    No murmur heard.  Pulmonary/Chest: Effort normal and breath sounds normal. He has no wheezes.   Abdominal: Soft. Bowel sounds are normal. He exhibits no distension and no mass. There is no tenderness.   Musculoskeletal: He exhibits no edema.   Lymphadenopathy:     He has no cervical adenopathy.   Neurological: He is alert and oriented to person, place, and time.   Skin: Skin is warm. No rash noted. No erythema.   Psychiatric: He has a normal mood and affect. His behavior is normal.       Assessment:       1. Status post liver transplant    2. Immunosuppression    3. History of hepatitis C, s/p successful Rx w/ SVR24 (cure) - 5/2018        Plan:   No immunosuppressive changes- continue tac 2/1 mg  Presumed recurrent cirrhosis  - needs US and AFP screening every 6 months    Clinic in 6 months.

## 2018-06-13 NOTE — PROGRESS NOTES
Patient and wife notified of Testosterone results.  Explained that it need to be rechecked along with a prolactin.  They voiced understanding.  Labs scheduled for tomorrow morning.

## 2018-06-14 ENCOUNTER — LAB VISIT (OUTPATIENT)
Dept: LAB | Facility: HOSPITAL | Age: 63
End: 2018-06-14
Attending: FAMILY MEDICINE
Payer: MEDICARE

## 2018-06-14 DIAGNOSIS — Z94.4 LIVER TRANSPLANTED: Primary | ICD-10-CM

## 2018-06-14 DIAGNOSIS — R79.89 LOW TESTOSTERONE: ICD-10-CM

## 2018-06-14 LAB
PROLACTIN SERPL IA-MCNC: 33.6 NG/ML
TESTOST SERPL-MCNC: 193 NG/DL

## 2018-06-14 PROCEDURE — 84403 ASSAY OF TOTAL TESTOSTERONE: CPT

## 2018-06-14 PROCEDURE — 84146 ASSAY OF PROLACTIN: CPT

## 2018-06-14 PROCEDURE — 36415 COLL VENOUS BLD VENIPUNCTURE: CPT | Mod: PO

## 2018-06-15 ENCOUNTER — TELEPHONE (OUTPATIENT)
Dept: ENDOCRINOLOGY | Facility: CLINIC | Age: 63
End: 2018-06-15

## 2018-06-15 ENCOUNTER — TELEPHONE (OUTPATIENT)
Dept: TRANSPLANT | Facility: CLINIC | Age: 63
End: 2018-06-15

## 2018-06-15 DIAGNOSIS — R79.89 ELEVATED PROLACTIN LEVEL: Primary | ICD-10-CM

## 2018-06-15 DIAGNOSIS — R79.89 LOW SERUM TESTOSTERONE LEVEL: ICD-10-CM

## 2018-06-15 NOTE — LETTER
Idalmis 15, 2018    Vick Longoria Carlos  [address] [facility] [address]             [facility] - Liver Transplant  [address]  [place] [address]  Phone: [phone] Mr. Gonzalez,     Dr. Coleman reviewed your ultrasound and said everything is stable.  We will repeat another ultrasound in 6 months (in December).     Please let me know if you have any questions or concerns.    Sincerely,    Kandy Herrera RN, BSN  Liver Transplant Coordinator

## 2018-06-15 NOTE — PROGRESS NOTES
Patient and wife informed of labs and that an endocrinology evaluation is needed to further evaluate why prolactin is elevated.  Discussed caused of elevated prolactin, including brain adenoma.  Referral placed.  Appointment scheduled for today.  They voiced understanding.

## 2018-07-02 ENCOUNTER — TELEPHONE (OUTPATIENT)
Dept: PAIN MEDICINE | Facility: CLINIC | Age: 63
End: 2018-07-02

## 2018-07-02 NOTE — TELEPHONE ENCOUNTER
Spoke with patient wife to inform of 08/22/2018 appointment at 9:20am [address],appointment letter placed in out going mail

## 2018-07-03 ENCOUNTER — TELEPHONE (OUTPATIENT)
Dept: PAIN MEDICINE | Facility: CLINIC | Age: 63
End: 2018-07-03

## 2018-07-20 RX ORDER — INSULIN ASPART 100 [IU]/ML
16 INJECTION, SOLUTION INTRAVENOUS; SUBCUTANEOUS
Qty: 15 ML | Refills: 11 | Status: SHIPPED | OUTPATIENT
Start: 2018-07-20 | End: 2019-02-04

## 2018-07-20 NOTE — TELEPHONE ENCOUNTER
----- Message from Aydee Self sent at 7/20/2018  3:42 PM CDT -----  He is needing novolog insulin   ----- Message -----  From: Asuncion Brock LPN  Sent: 7/20/2018   3:30 PM  To: Aydee Self    Is he needing more test strips or is he needing medication?  Please clarify.  ----- Message -----  From: Aydee Self  Sent: 7/20/2018   3:26 PM  To: John Alfonso Staff    Patient needs a new script for this medication that states he is testing more than 12 units into the skin three times a day, his insurance will not pay for medication until 8.11, he states that he test every time he eats and he is out of medication

## 2018-07-23 NOTE — PROGRESS NOTES
Subjective:      Patient ID: Virgil Gr is a 62 y.o. male.    Chief Complaint:  Other Misc (Testesteron low)      History of Present Illness  Virgil Gr presents today for Evaluation & management of elevated prolactin level with low testosterone. This is his first visit with me.     Liver transplant in 2009.  2 back surgeries, takes oxycodone 15 mg QID PRN- takes it TID usually     Patient presented to urology with low testosterone. Urology paired T level with prolactin level and the prolactin level was elevated.     Results for VIRGIL GR (MRN [id number]) as of 7/24/2018 13:29   Ref. Range 6/12/2018 07:32 6/14/2018 07:31   Prolactin Latest Ref Range: 3.5 - 19.4 ng/mL  33.6 (H)   Testosterone, Total Latest Ref Range: 195.0 - 1138.0 ng/dL 143 (L) 193 (L)     Is not on testosterone replacement. Has never been on replacment.     With regards to the hyperprolactinemia or prolactinoma :    Currently pt reports no galactorrhea, diplopia or chronic headaches.  Decreased libido  No spontaneous erections or morning erections.     + weight gain over the past 4-5 months   No fatigue, cold intolerance   No nausea or vomiting  No orthostatic symptoms     Renal failure? No     Results for VIRGIL GR (MRN [id number]) as of 7/25/2018 09:09   Ref. Range 3/28/2018 08:53   TSH Latest Ref Range: 0.400 - 4.000 uIU/mL 4.463 (H)     Diagnosed with hypothyroidism a few years ago and does not take lt4. Was prescribed 88 mcg daily.     Does not snore per wife or have trouble sleeping feels rested in the morning.     Review of Systems   Constitutional: Positive for fatigue and unexpected weight change (gain).   Eyes: Negative for visual disturbance.   Respiratory: Negative for cough and shortness of breath.    Cardiovascular: Negative for chest pain.   Gastrointestinal: Negative for abdominal pain.   Endocrine: Negative for cold intolerance, heat intolerance, polydipsia, polyphagia and polyuria.   Musculoskeletal: Negative  for arthralgias.   Skin: Negative for wound.   Neurological: Negative for headaches.   Hematological: Does not bruise/bleed easily.   Psychiatric/Behavioral: Negative for sleep disturbance.     Objective:   Physical Exam   Constitutional: He appears well-developed.   HENT:   Right Ear: External ear normal.   Left Ear: External ear normal.   Nose: Nose normal.   Hearing Normal     Neck: No tracheal deviation present. No thyromegaly present.   Cardiovascular: Normal rate.    No murmur heard.  No edema present   Pulmonary/Chest: Effort normal and breath sounds normal.   Abdominal: Soft. He exhibits no mass. No hernia.   Neurological: He is alert. No cranial nerve deficit or sensory deficit.   Skin: No rash noted.   No nodules.   Psychiatric: He has a normal mood and affect. Judgment normal.   Vitals reviewed.    Body mass index is 36.71 kg/m².    Lab Review:   Lab Results   Component Value Date    HGBA1C 8.3 (H) 03/28/2018     Lab Results   Component Value Date    CHOL 165 03/28/2018    HDL 34 (L) 03/28/2018    LDLCALC 95.0 03/28/2018    TRIG 180 (H) 03/28/2018    CHOLHDL 20.6 03/28/2018     Lab Results   Component Value Date     (L) 05/28/2018    K 4.4 05/28/2018     05/28/2018    CO2 23 05/28/2018     (H) 05/28/2018    BUN 13 05/28/2018    CREATININE 1.3 05/28/2018    CALCIUM 10.0 05/28/2018    PROT 7.7 05/28/2018    ALBUMIN 3.7 05/28/2018    BILITOT 0.4 05/28/2018    ALKPHOS 110 05/28/2018    AST 17 05/28/2018    ALT 27 05/28/2018    ANIONGAP 10 05/28/2018    ESTGFRAFRICA >60.0 05/28/2018    EGFRNONAA 59.3 (A) 05/28/2018    TSH 4.463 (H) 03/28/2018     Assessment and Plan     1. Acquired hypothyroidism  TSH   2. Low testosterone in male  Testosterone Panel    Cortisol, 8AM   3. Elevated prolactin level  Luteinizing hormone    Follicle stimulating hormone    Insulin-like growth factor    Prolactin     -- Check above labs today   -- If prolactin elevated and gonadotropins normal will proceed with  MRI of pituitary   -- If macroadenoma get visual fields asap.   -- Pain medication TID could be causing elevated prolactin as well.     -- Reviewed indications for therapy With cabergoline including macroadenoma, galactorrhea, infertility, irregular periods and hypogonadism which can negatively impact bone health.     -- If TSH still elevated will restart patients lt4.  -- Avoid exogenous hyperthyroidism as this can accelerate bone loss and increase risk of CV complications.  -- Advised to take LT4 on an empty stomach with water and to wait 30-45 minutes before eating or taking other medications   -- Reviewed that symptoms of hypothyroidism may not correlate with tsh, and a normal TSH is the goal of therapy.....  symptoms are not a justification for over treatment       Follow-up in about 1 year (around 7/25/2019).    Case discussed with Staff  Recommendations were discussed with the patient in detail  The patient verbalized understanding and agrees with the plan outlined as above.

## 2018-07-25 ENCOUNTER — OFFICE VISIT (OUTPATIENT)
Dept: ENDOCRINOLOGY | Facility: CLINIC | Age: 63
End: 2018-07-25
Payer: MEDICARE

## 2018-07-25 VITALS
SYSTOLIC BLOOD PRESSURE: 122 MMHG | HEIGHT: 74 IN | WEIGHT: 285.94 LBS | HEART RATE: 88 BPM | DIASTOLIC BLOOD PRESSURE: 82 MMHG | BODY MASS INDEX: 36.7 KG/M2

## 2018-07-25 DIAGNOSIS — R79.89 LOW TESTOSTERONE IN MALE: ICD-10-CM

## 2018-07-25 DIAGNOSIS — R79.89 ELEVATED PROLACTIN LEVEL: ICD-10-CM

## 2018-07-25 DIAGNOSIS — E03.9 ACQUIRED HYPOTHYROIDISM: Primary | ICD-10-CM

## 2018-07-25 PROCEDURE — 99999 PR PBB SHADOW E&M-EST. PATIENT-LVL III: CPT | Mod: PBBFAC,,, | Performed by: NURSE PRACTITIONER

## 2018-07-25 PROCEDURE — 99214 OFFICE O/P EST MOD 30 MIN: CPT | Mod: S$PBB,,, | Performed by: NURSE PRACTITIONER

## 2018-07-25 PROCEDURE — 99213 OFFICE O/P EST LOW 20 MIN: CPT | Mod: PBBFAC | Performed by: NURSE PRACTITIONER

## 2018-07-30 ENCOUNTER — TELEPHONE (OUTPATIENT)
Dept: ENDOCRINOLOGY | Facility: CLINIC | Age: 63
End: 2018-07-30

## 2018-07-30 DIAGNOSIS — R79.89 LOW TESTOSTERONE IN MALE: Primary | ICD-10-CM

## 2018-07-30 NOTE — TELEPHONE ENCOUNTER
----- Message from Anitha Ordonez NP sent at 7/30/2018  7:55 AM CDT -----  Please schedule lab asap and alert patient.    Thank you,   Anitha

## 2018-07-30 NOTE — TELEPHONE ENCOUNTER
Spoke with wife and pt wife wants to speak with Anitha first and wants to find out what is going on with his Thyroid before schedule his lab work.  Notified anitha and she will call a pt wife.

## 2018-07-30 NOTE — TELEPHONE ENCOUNTER
Called and spoke with patients wife and explained to her that we need to do one more lab to ensure that we do not need to perform an MRI. She verbalized understanding.

## 2018-07-31 ENCOUNTER — LAB VISIT (OUTPATIENT)
Dept: LAB | Facility: HOSPITAL | Age: 63
End: 2018-07-31
Attending: NURSE PRACTITIONER
Payer: MEDICARE

## 2018-07-31 DIAGNOSIS — R79.89 LOW TESTOSTERONE IN MALE: ICD-10-CM

## 2018-07-31 LAB — CORTIS SERPL-MCNC: 12 UG/DL

## 2018-07-31 PROCEDURE — 36415 COLL VENOUS BLD VENIPUNCTURE: CPT | Mod: PO

## 2018-07-31 PROCEDURE — 82533 TOTAL CORTISOL: CPT

## 2018-08-02 LAB
ANNOTATION COMMENT IMP: ABNORMAL
MACROPROLACTIN/PROLACTIN MFR SERPL: 61 %
PROLACTIN SERPL-MCNC: 10.6 NG/ML
PROLACTIN SERPL-MCNC: 27 NG/ML

## 2018-08-03 RX ORDER — AMITRIPTYLINE HYDROCHLORIDE 25 MG/1
25 TABLET, FILM COATED ORAL NIGHTLY
Qty: 30 TABLET | Refills: 5 | Status: SHIPPED | OUTPATIENT
Start: 2018-08-03 | End: 2019-07-01 | Stop reason: SDUPTHER

## 2018-08-06 ENCOUNTER — TELEPHONE (OUTPATIENT)
Dept: ENDOCRINOLOGY | Facility: CLINIC | Age: 63
End: 2018-08-06

## 2018-08-06 ENCOUNTER — HOSPITAL ENCOUNTER (OUTPATIENT)
Dept: RADIOLOGY | Facility: HOSPITAL | Age: 63
Discharge: HOME | End: 2018-08-06
Attending: NURSE PRACTITIONER
Payer: MEDICARE

## 2018-08-06 DIAGNOSIS — R79.89 LOW TESTOSTERONE IN MALE: Primary | ICD-10-CM

## 2018-08-06 DIAGNOSIS — R79.89 LOW TESTOSTERONE IN MALE: ICD-10-CM

## 2018-08-06 LAB
CREAT SERPL-MCNC: 1.1 MG/DL (ref 0.5–1.4)
SAMPLE: NORMAL

## 2018-08-06 PROCEDURE — A9585 GADOBUTROL INJECTION: HCPCS | Performed by: NURSE PRACTITIONER

## 2018-08-06 PROCEDURE — 70553 MRI BRAIN STEM W/O & W/DYE: CPT | Mod: 26,,, | Performed by: RADIOLOGY

## 2018-08-06 PROCEDURE — 25500020 PHARM REV CODE 255: Performed by: NURSE PRACTITIONER

## 2018-08-06 PROCEDURE — 70553 MRI BRAIN STEM W/O & W/DYE: CPT | Mod: TC

## 2018-08-06 RX ORDER — GADOBUTROL 604.72 MG/ML
5 INJECTION INTRAVENOUS
Status: COMPLETED | OUTPATIENT
Start: 2018-08-06 | End: 2018-08-06

## 2018-08-06 RX ADMIN — GADOBUTROL 5 ML: 604.72 INJECTION INTRAVENOUS at 08:08

## 2018-08-06 NOTE — TELEPHONE ENCOUNTER
Spoke with pt and and explained pt wife word to word kelvin Welsh said and schedule for MRI for today and alert the pt. Still pt wife want a hear a word from Anitha. Please call pt wife and let her know about pt result.

## 2018-08-06 NOTE — TELEPHONE ENCOUNTER
----- Message from Lashell Jurado MA sent at 8/6/2018 10:33 AM CDT -----  Contact: wife      ----- Message -----  From: Zaira Stratton  Sent: 8/6/2018  10:23 AM  To: Eve Bal Staff    .Test Results    Type of Test: lab  Date of Test: 7/31  Communication Preference: [phone]  Additional Information:

## 2018-08-07 ENCOUNTER — TELEPHONE (OUTPATIENT)
Dept: ENDOCRINOLOGY | Facility: CLINIC | Age: 63
End: 2018-08-07

## 2018-08-07 NOTE — TELEPHONE ENCOUNTER
Discussed mri with dr lizama and the patient is cleared from an endocrine standpoint to be treated with testosterone. Message sent to urologist and attempted to call patient with results, no answer and VM full. Will attempt to call again later.

## 2018-08-07 NOTE — TELEPHONE ENCOUNTER
----- Message from Zaira Stratton sent at 8/7/2018  3:27 PM CDT -----  Contact: Wife  .Patient Returning Call from [facility]    Who Left Message for Patient:  Anitha  Communication Preference: [phone]  Additional Information:

## 2018-08-10 ENCOUNTER — TELEPHONE (OUTPATIENT)
Dept: FAMILY MEDICINE | Facility: CLINIC | Age: 63
End: 2018-08-10

## 2018-08-14 ENCOUNTER — OFFICE VISIT (OUTPATIENT)
Dept: UROLOGY | Facility: CLINIC | Age: 63
End: 2018-08-14
Payer: MEDICARE

## 2018-08-14 VITALS
SYSTOLIC BLOOD PRESSURE: 138 MMHG | WEIGHT: 280 LBS | HEART RATE: 122 BPM | DIASTOLIC BLOOD PRESSURE: 79 MMHG | BODY MASS INDEX: 37.11 KG/M2 | HEIGHT: 73 IN

## 2018-08-14 DIAGNOSIS — N52.1 TYPE 2 DIABETES MELLITUS WITH CIRCULATORY DISORDER CAUSING ERECTILE DYSFUNCTION: ICD-10-CM

## 2018-08-14 DIAGNOSIS — N13.8 BPH WITH URINARY OBSTRUCTION: ICD-10-CM

## 2018-08-14 DIAGNOSIS — E11.59 TYPE 2 DIABETES MELLITUS WITH CIRCULATORY DISORDER CAUSING ERECTILE DYSFUNCTION: ICD-10-CM

## 2018-08-14 DIAGNOSIS — E29.1 MALE HYPOGONADISM: Primary | ICD-10-CM

## 2018-08-14 DIAGNOSIS — N40.1 BPH WITH URINARY OBSTRUCTION: ICD-10-CM

## 2018-08-14 PROCEDURE — 99215 OFFICE O/P EST HI 40 MIN: CPT | Mod: PBBFAC | Performed by: NURSE PRACTITIONER

## 2018-08-14 PROCEDURE — 99214 OFFICE O/P EST MOD 30 MIN: CPT | Mod: S$PBB,,, | Performed by: NURSE PRACTITIONER

## 2018-08-14 PROCEDURE — 99999 PR PBB SHADOW E&M-EST. PATIENT-LVL V: CPT | Mod: PBBFAC,,, | Performed by: NURSE PRACTITIONER

## 2018-08-14 RX ORDER — TESTOSTERONE GEL, 1% 10 MG/G
5 GEL TRANSDERMAL DAILY
Qty: 30 PACKET | Refills: 5 | Status: SHIPPED | OUTPATIENT
Start: 2018-08-14 | End: 2019-02-08

## 2018-08-14 RX ORDER — PAPAVERINE HYDROCHLORIDE 30 MG/ML
INJECTION INTRAMUSCULAR; INTRAVENOUS
Qty: 5 ML | Refills: 0 | Status: SHIPPED | OUTPATIENT
Start: 2018-08-14 | End: 2019-02-08

## 2018-08-14 NOTE — PROGRESS NOTES
Subjective:       Patient ID: Vick Gonzalez is a 62 y.o. male.    Chief Complaint: Low Testosterone and Erectile Dysfunction    Vick Gonzalez is a 62 y.o. Diabetic male with history low T and ED   He was last seen in clinic 06/11/2018  He has 2 low T (193/143) with high prolactin (33.6)  He was seen by endocrine (07/25/2018) and PCP.  Normal MRI of Pituitary 08/06/2018.  They cleared him for HRT.    He is here today to discuss ED and low T.  ED > one year.  Tried and failed oral agents  Gets soft filling but not firm enough for intercourse.  He also interested in HRT.                                         PSA                  0.28                10/09/2017                 PSA                  0.30                09/06/2016         Past Medical History:  No date: Anemia  No date: Anxiety  7/13/2011: Chronic pain syndrome  No date: CKD (chronic kidney disease), stage III  No date: Diabetes mellitus type II, uncontrolled  1/16/2015: Discitis of lumbosacral region  No date: ED (erectile dysfunction)  No date: Genital herpes  No date: Gout, arthritis  No date: History of alcohol abuse  8/23/2011: History of hepatitis C, s/p successful Rx w/ SVR24 (cure) -   5/2018      Comment:  Completed 24wks Epclusa + RBV w/SVR12 - 2/2018  -   No date: History of positive PPD, treatment status unknown      Comment:  Pulmonary granulomas, negative sputum cultures for AFB                and indeterminate quantferon test  No date: History of substance abuse  No date: Hypertension  No date: Hypothyroidism  8/23/2011: Liver replaced by transplant      Comment:  DATE: 12/16/2013  LIVER BIOPSY : REASON:  hep C staging                PATHOLOGY COMMITTEE NOTE/PLAN: :grade  1 / stage 1      No date: Peptic ulcer disease    Past Surgical History:  No date: CHOLECYSTECTOMY  06/2010: LIVER TRANSPLANT  No date: SPINE SURGERY    Review of patient's family history indicates:  Problem: Cancer      Relation: Mother          Age of Onset: (Not  Specified)  Problem: Diabetes      Relation: Mother          Age of Onset: (Not Specified)  Problem: Heart disease      Relation: Mother          Age of Onset: (Not Specified)  Problem: Diabetes      Relation: Sister          Age of Onset: (Not Specified)  Problem: Cancer      Relation: Maternal Uncle          Age of Onset: 82          Comment: colon CA  Problem: Melanoma      Relation: Neg Hx          Age of Onset: (Not Specified)  Problem: Psoriasis      Relation: Neg Hx          Age of Onset: (Not Specified)  Problem: Lupus      Relation: Neg Hx          Age of Onset: (Not Specified)  Problem: Eczema      Relation: Neg Hx          Age of Onset: (Not Specified)  Problem: Acne      Relation: Neg Hx          Age of Onset: (Not Specified)      Social History    Socioeconomic History      Marital status:       Spouse name: Not on file      Number of children: Not on file      Years of education: Not on file      Highest education level: Not on file    Social Needs      Financial resource strain: Not on file      Food insecurity - worry: Not on file      Food insecurity - inability: Not on file      Transportation needs - medical: Not on file      Transportation needs - non-medical: Not on file    Occupational History      Not on file    Tobacco Use      Smoking status: Former Smoker      Smokeless tobacco: Never Used    Substance and Sexual Activity      Alcohol use: No        Comment: over 5 years ago, none currently      Drug use: No      Sexual activity: Not on file    Other Topics      Concerns:        Not on file    Social History Narrative      Not on file      Allergies:  Patient has no known allergies.    Medications:  Current Outpatient Medications:   allopurinol (ZYLOPRIM) 100 MG tablet, Take 1 tablet (100 mg total) by mouth 2 (two) times daily., Disp: 60 tablet, Rfl: 11  amitriptyline (ELAVIL) 25 MG tablet, TAKE 1 TABLET (25 MG TOTAL) BY MOUTH EVERY EVENING. FOR NERVE PAIN AND SLEEP, Disp: 30  "tablet, Rfl: 5  BD ULTRA-FINE MENDY PEN NEEDLES 32 gauge x 5/32" Ndle, , Disp: , Rfl:   blood sugar diagnostic Strp, 1 strip by Misc.(Non-Drug; Combo Route) route 4 (four) times daily before meals and nightly., Disp: 100 strip, Rfl: 11  blood-glucose meter Misc, 1 each by Misc.(Non-Drug; Combo Route) route 4 (four) times daily before meals and nightly., Disp: 1 each, Rfl: 0  calcium carbonate-vitamin D3 (CALTRATE 600 + D) 600 mg(1,500mg) -400 unit Chew, , Disp: , Rfl:   colchicine 0.6 mg tablet, Take 1 tablet (0.6 mg total) by mouth 2 (two) times daily., Disp: 30 tablet, Rfl: 2  gabapentin (NEURONTIN) 800 MG tablet, Take 1 tablet (800 mg total) by mouth 3 (three) times daily., Disp: 90 tablet, Rfl: 11  insulin aspart U-100 (NOVOLOG) 100 unit/mL InPn pen, Inject 16 Units into the skin 3 (three) times daily with meals., Disp: 15 mL, Rfl: 11  insulin detemir U-100 (LEVEMIR FLEXTOUCH) 100 unit/mL (3 mL) SubQ InPn pen, Inject 60 Units into the skin every evening., Disp: 15 mL, Rfl: 11  lancets Misc, 1 each by Misc.(Non-Drug; Combo Route) route 4 (four) times daily before meals and nightly., Disp: 200 each, Rfl: 11  levothyroxine (SYNTHROID) 88 MCG tablet, TAKE 1 TABLET (88 MCG TOTAL) BY MOUTH ONCE DAILY., Disp: 30 tablet, Rfl: 11  lisinopril (PRINIVIL,ZESTRIL) 20 MG tablet, Take 1 tablet (20 mg total) by mouth once daily., Disp: 90 tablet, Rfl: 3  metoprolol succinate (TOPROL-XL) 200 MG 24 hr tablet, Take 1 tablet (200 mg total) by mouth once daily., Disp: 30 tablet, Rfl: 11  multivitamin (THERAGRAN) per tablet, Take 1 tablet by mouth., Disp: , Rfl:   NIFEdipine (ADALAT CC) 60 MG TbSR, TAKE 1 TABLET (60 MG TOTAL) BY MOUTH ONCE DAILY., Disp: 90 tablet, Rfl: 3  NOVOFINE PLUS 32 gauge x 1/6" Ndle, , Disp: , Rfl:   oxyCODONE (ROXICODONE) 15 MG Tab, Take 1 tablet (15 mg total) by mouth 4 (four) times daily as needed for Pain., Disp: 120 tablet, Rfl: 0  papaverine 30 mg/mL injection, Add: Phentolamine 10 mg " "Add: PGE1 100 mcg  Sig:  Give a TEST DOSE; 1st dose to be given under medical supervision, Disp: 5 mL, Rfl: 0  pen needle, diabetic (BD ULTRA-FINE MENDY PEN NEEDLE) 32 gauge x 5/32" Ndle, TEST WITH NOVOLOG THREE TIMES A DAY WITH MEALS AND WITH LEVEMIR AT BEDTIME, Disp: 100 each, Rfl: 11  sertraline (ZOLOFT) 100 MG tablet, TAKE 1 TABLET (100 MG TOTAL) BY MOUTH ONCE DAILY., Disp: 30 tablet, Rfl: 7  sildenafil (REVATIO) 20 mg Tab, Take 5 po 1 hour before sexual activity, Disp: 50 tablet, Rfl: 11  tacrolimus (PROGRAF) 1 MG Cap, TAKE 2 CAPSULES ( 2 MG TOTAL) BY MOUTH IN THE MORNING & TAKE 1 CAPSULE ( 1 MG TOTAL) IN THE EVENING, Disp: 90 capsule, Rfl: 11       Review of Systems   Constitutional: Negative for activity change, appetite change, chills and fever.   HENT: Negative for facial swelling and trouble swallowing.    Eyes: Negative for visual disturbance.   Respiratory: Negative for chest tightness and shortness of breath.    Cardiovascular: Negative for chest pain and palpitations.   Gastrointestinal: Negative.  Negative for abdominal pain, constipation, diarrhea, nausea and vomiting.   Genitourinary: Negative for difficulty urinating, dysuria, flank pain, hematuria, penile pain, penile swelling, scrotal swelling and testicular pain.        He is ok with how he urinates.     Musculoskeletal: Negative for back pain, gait problem, myalgias and neck stiffness.   Skin: Negative for rash.   Neurological: Negative for dizziness and speech difficulty.   Hematological: Does not bruise/bleed easily.   Psychiatric/Behavioral: Negative for behavioral problems.       Objective:      Physical Exam   Nursing note and vitals reviewed.  Constitutional: He is oriented to person, place, and time. Vital signs are normal. He appears well-developed and well-nourished. He is active and cooperative.  Non-toxic appearance. He does not have a sickly appearance.   HENT:   Head: Normocephalic and atraumatic.   Right Ear: External ear " normal.   Left Ear: External ear normal.   Nose: Nose normal.   Mouth/Throat: Mucous membranes are normal.   Eyes: Conjunctivae and lids are normal. No scleral icterus.   Neck: Trachea normal, normal range of motion and full passive range of motion without pain. Neck supple. No JVD present. No tracheal deviation present.   Cardiovascular: Normal rate, S1 normal and S2 normal.    Pulmonary/Chest: Effort normal. No respiratory distress. He exhibits no tenderness.   Abdominal: Soft. Normal appearance and bowel sounds are normal. There is no hepatosplenomegaly. There is no tenderness. There is no CVA tenderness.   Genitourinary: Testes normal and penis normal. No penile tenderness.   Musculoskeletal: Normal range of motion.   Lymphadenopathy: No inguinal adenopathy noted on the right or left side.   Neurological: He is alert and oriented to person, place, and time. He has normal strength.   Skin: Skin is warm, dry and intact.     Psychiatric: He has a normal mood and affect. His behavior is normal. Judgment and thought content normal.       Assessment:       1. Male hypogonadism    2. Type 2 diabetes mellitus with circulatory disorder causing erectile dysfunction        Plan:         I spent 35 minutes with the patient of which more than half was spent in direct consultation with the patient in regards to our treatment and plan.    Education and recommendations of today's plan of care including home remedies.  We discussed ED and contributory factors.  We discussed 1st, 2nd, 3rd line therapies.  With his medical history recommend [facility] and [facility].  We discussed in detail the benefits,risks,se and effectiveness of the ED treatments.  I gave him information in [facility], [facility] and [facility]  He going to call and check prices on [facility].  We make him appt to meet the [facility] rep.  We discussed HRT.  We discussed benefits,risks,se. Discussed the monitoring of needed labs to prevent complications.  We require labs and f/u every 6 months. If  noncompliant then we will not continue HRT.  Rx for Testosterone gel given. Instructed to apply to shoulders daily; allow to dry, keep covered ; discussed risks with skin to skin contact and transference.  If not covered then wash off.  Once started with recheck T in 2 weeks of starting.  3 months then check PSA.  Will call and check.  Voiced understanding

## 2018-08-14 NOTE — PATIENT INSTRUCTIONS
Understanding Erectile Dysfunction    Erectile dysfunction (ED) is a problem getting an erection firm enough or keeping it long enough for intercourse. The problem can happen to any man at any age. But health problems that can lead to ED become more common as a man ages. Up to half of men over age 40 experience ED at some point.  Causes of ED  ED can have many causes. Most are physical. Some are emotional issues. Often, a combination of causes is involved. Causes of ED may include:  · Medical conditions such as diabetes or depression  · Smoking tobacco or marijuana  · Drinking too much alcohol  · Side effects of medications  · Injury to nerves or blood vessels  · Emotional issues such as stress or relationship problems  ED can be treated  Prescription medications for ED are available. They help many men who try them. Depending upon the cause of the ED, though, medications may not be enough. In these cases, other treatment options are available. These include erectile aids and surgery. Your health care provider can tell you more about the treatment that is right for you. And new treatments for ED are being studied. No matter what the treatment you decide on, stay in touch with your doctor. If your symptoms persist, he or she may be able to adjust your current treatment or try something new.  Date Last Reviewed: 1/1/2017  © 2864-7218 [employer]. [address] [place] [address], [place] [address]. All rights reserved. This information is not intended as a substitute for professional medical care. Always follow your healthcare professional's instructions.        Penile Self-Injection: Notes and Precautions     Man and healthcare provider sitting across from one another, talking.   Penile self-injection is a simple technique that may improve your sex life. Some men even find that self-injection leads to an increase in natural erections. If you have questions or concerns about self-injection or erectile  dysfunction (ED), talk with your healthcare provider. The information on this sheet will help you get the best results.  Notes about penile self-injection  · You may feel a mild burning during injection. This is OK. But if you feel pressure or severe pain, stop the injection. There may be a problem with the injection site.  · Only inject the medicine on the side of your penis. It may not work if injected elsewhere.  · To prevent scarring, inject in a different spot each time.  · Dont use this treatment if you have a bleeding disorder or any risk of infection.  · Get medical help right away if your erection lasts longer than 3 to 4 hours.  Work with your healthcare provider  Ask how often you can safely repeat injections, as well as any other questions you have. You and your healthcare provider will talk about follow-up exams and how to get supplies. If the medicine doesnt work or stops working over time, tell your healthcare provider.     When to call your healthcare provider  Call your healthcare provider right away if any of these occur:  · An erection that lasts longer than 3 to 4  hours  · Bleeding or bruising  · Severe pain  · Scarring or curvature of the penis   Date Last Reviewed: 1/1/2017  © 1937-6113 [employer]. [address] [place] [address]. All rights reserved. This information is not intended as a substitute for professional medical care. Always follow your healthcare professional's instructions.        Penile Self-Injection Procedure  Self-injection is a good option if you have erectile dysfunction (ED). You insert a tiny needle into your penis and inject a medicine. This helps your penis get hard and stay that way long enough for sex. And sex and orgasm will feel as good as always. You may be nervous about doing self-injection at first. But with practice, it will get easier. Your healthcare provider will show you how to do self-injection the first time.  Talk to your doctor  about any medicines you take and any medical problems you have.      Preparing for injection  · Wash your hands well with soap and water.  · Prepare the medicine (if needed).  · Sit or  a comfortable position in a warm, well-lit room. If you need to, sit or  front of a mirror.  · Find an injection site on one side of your penis, in a place with no visible veins. (Dont inject into the top, bottom, or head of the penis.)  · Clean the injection site with an alcohol swab. Grasp the head of your penis firmly with your thumb and forefinger (dont just pinch the skin). Stretch the penis so the skin on the shaft is taut.  Injecting the medicine  · Rest your penis against your inner thigh and pull it gently toward your knee. Dont twist or rotate it. This way youll be sure to inject the medicine into the spot you chose and cleaned before.  · Hold the syringe between your thumb and fingers, like youre holding a pen. Rest your forearm on your thigh for support.  · Insert the needle at a 90° angle (perpendicular) to the shaft. Do this quickly to reduce discomfort. (The needle should go in easily. If it doesnt, stop right away.)  · Move your thumb to the plunger. Press down to inject the medicine, counting to 5.  · Remove the needle and dispose of it safely.  Gaining an erection  · Apply pressure to the injection site for a few minutes. This prevents swelling and bruising and helps spread the medicine.   · Stand up. This may help your erection develop. Foreplay often helps, too.  · Your penis should become firm within 10 to 20 minutes. The erection will last long enough for sex, and maybe longer.  When to seek medical care  An erection that lasts longer than 3 to 4  hours  · Bleeding or bruising  · Severe pain  · Scarring or curvature of the penis   Date Last Reviewed: 1/7/2017  © 3494-3873 [employer]. [address] [place] [address], [place] [address]. All rights reserved. This information is not  intended as a substitute for professional medical care. Always follow your healthcare professional's instructions.        Testosterone topical solution  What is this medicine?  TESTOSTERONE (hernando TOS ter one) is the main male hormone. It supports normal male traits such as muscle growth, facial hair, and deep voice. This medicine is used in males to treat low testosterone levels.  How should I use this medicine?  This medicine is applied at the same time every day (preferably in the morning) to clean, dry, intact skin of the armpit. Follow the directions on the prescription label. If you take a bath or shower in the morning, apply the medicine after the bath or shower. If you use deodorants or antiperspirants, use them at least 2 minutes before applying this medicine. Only apply this medicine to the armpits. Do not use on any other body part. To use, remove the cap and the applicator cup from the pump. Fully depress the pump once to dispense solution into the applicator cup. With the applicator cup held upright, wipe the solution down and up into the armpit. Do not use your fingers or hand to rub the medicine into the skin. Allow the skin to dry a few minutes before putting on clothing. The skin solution is flammable. Avoid fire, flame, or smoking until the solution has dried. Wash your hands after use. Avoid bathing or swimming for at least 2 hours after you apply the medicine.  A special MedGuide will be given to you by the pharmacist with each prescription and refill. Be sure to read this information carefully each time.  Talk to your pediatrician regarding the use of this medicine in children. Special care may be needed.  What side effects may I notice from receiving this medicine?  Side effects that you should report to your doctor or health care professional as soon as possible:  · allergic reactions like skin rash, itching or hives, swelling of the face, lips, or tongue  · breast enlargement  · breathing  problems  · changes in emotions or moods, especially anger, depression, or rage  · dark urine  · general ill feeling or flu-like symptoms  · light-colored stools  · loss of appetite, nausea  · nausea, vomiting  · pain, swelling, warmth in the leg  · right upper belly pain  · stomach pain  · swelling of the ankles, feet, hands  · too frequent or persistent erections  · trouble passing urine or change in the amount of urine  · unusually weak or tired  · yellowing of the eyes or skinSide effects that usually do not require medical attention (Report these to your doctor or health care professional if they continue or are bothersome.):  · acne  · change in sex drive or performance  · diarrhea  · hair loss  · headache  What may interact with this medicine?  · certain medicines for diabetes  · certain medicines that treat or prevent blood clots like warfarin  · oxyphenbutazone  · propranolol  · steroid medicines like prednisone or cortisone  What if I miss a dose?  If you miss a dose, use it as soon as you can. If it is almost time for your next dose, use only that dose. Do not use double or extra doses.  Where should I keep my medicine?  Keep out of the reach of children. This medicine can be abused. Keep your medicine in a safe place to protect it from theft. Do not share this medicine with anyone. Selling or giving away this medicine is dangerous and against the law.  Store at room temperature between 15 to 30 degrees C (59 to 86 degrees F). Keep closed until use. Protect from heat and light. This medicine is flammable. Avoid exposure to heat, fire, flame, and smoking. Throw away any unused medicine after the expiration date.  What should I tell my health care provider before I take this medicine?  They need to know if you have any of these conditions:  · breast cancer  · breathing problems while you sleep (sleep apnea)  · diabetes  · heart disease  · if a female partner is pregnant or trying to get pregnant  · kidney  disease  · liver disease  · lung disease  · prostate cancer, enlargement  · an unusual or allergic reaction to testosterone, other medicines, foods, dyes, or preservatives  · pregnant or trying to get pregnant  · breast-feeding  What should I watch for while using this medicine?  Visit your doctor or health care professional for regular checks on your progress. They will need to check the level of testosterone in your blood.  This medicine is only approved for use in men who have low levels of testosterone related to certain medical conditions. Heart attacks and strokes have been reported with the use of this medicine. Notify your doctor or health care professional and seek emergency treatment if you develop breathing problems; changes in vision; confusion; chest pain or chest tightness; sudden arm pain; severe, sudden headache; trouble speaking or understanding; sudden numbness or weakness of the face, arm or leg; loss of balance or coordination. Talk to your doctor about the risks and benefits of this medicine.  This medicine can transfer from your body to others. If a person or pet comes in contact with the area where this medicine was applied to your skin, they may have a serious risk of side effects. If you cannot avoid skin-to-skin contact with another person, make sure the site where this medicine was applied is covered with clothing. If accidental contact happens, the skin of the person or pet should be washed right away with soap and water. Also, a female partner who is pregnant or trying to get pregnant should avoid contact with the gel or treated skin.  This medicine may affect blood sugar levels. If you have diabetes, check with your doctor or health care professional before you change your diet or the dose of your diabetic medicine.  This drug is banned from use in athletes by most athletic organizations.  NOTE:This sheet is a summary. It may not cover all possible information. If you have questions  about this medicine, talk to your doctor, pharmacist, or health care provider. Copyright© 2017 [employer]

## 2018-08-22 ENCOUNTER — OFFICE VISIT (OUTPATIENT)
Dept: PAIN MEDICINE | Facility: CLINIC | Age: 63
End: 2018-08-22
Payer: MEDICARE

## 2018-08-22 VITALS
HEART RATE: 82 BPM | HEIGHT: 73 IN | WEIGHT: 281.75 LBS | BODY MASS INDEX: 37.34 KG/M2 | DIASTOLIC BLOOD PRESSURE: 85 MMHG | SYSTOLIC BLOOD PRESSURE: 134 MMHG

## 2018-08-22 DIAGNOSIS — G89.4 CHRONIC PAIN SYNDROME: ICD-10-CM

## 2018-08-22 DIAGNOSIS — Z79.891 ENCOUNTER FOR MONITORING OPIOID MAINTENANCE THERAPY: ICD-10-CM

## 2018-08-22 DIAGNOSIS — G95.9 LUMBAR MYELOPATHY: ICD-10-CM

## 2018-08-22 DIAGNOSIS — Z51.81 ENCOUNTER FOR MONITORING OPIOID MAINTENANCE THERAPY: ICD-10-CM

## 2018-08-22 DIAGNOSIS — M51.36 DDD (DEGENERATIVE DISC DISEASE), LUMBAR: ICD-10-CM

## 2018-08-22 DIAGNOSIS — M43.10 ACQUIRED SPONDYLOLISTHESIS: ICD-10-CM

## 2018-08-22 DIAGNOSIS — M96.1 POSTLAMINECTOMY SYNDROME OF LUMBAR REGION: ICD-10-CM

## 2018-08-22 DIAGNOSIS — M54.16 LUMBAR RADICULOPATHY: ICD-10-CM

## 2018-08-22 PROCEDURE — 99213 OFFICE O/P EST LOW 20 MIN: CPT | Mod: PBBFAC | Performed by: NURSE PRACTITIONER

## 2018-08-22 PROCEDURE — 99999 PR PBB SHADOW E&M-EST. PATIENT-LVL III: CPT | Mod: PBBFAC,,, | Performed by: NURSE PRACTITIONER

## 2018-08-22 PROCEDURE — 99214 OFFICE O/P EST MOD 30 MIN: CPT | Mod: S$PBB,,, | Performed by: NURSE PRACTITIONER

## 2018-08-22 PROCEDURE — 80307 DRUG TEST PRSMV CHEM ANLYZR: CPT

## 2018-08-22 RX ORDER — OXYCODONE HYDROCHLORIDE 15 MG/1
15 TABLET ORAL 4 TIMES DAILY PRN
Qty: 120 TABLET | Refills: 0 | Status: SHIPPED | OUTPATIENT
Start: 2018-09-21 | End: 2018-10-21

## 2018-08-22 RX ORDER — OXYCODONE HYDROCHLORIDE 15 MG/1
15 TABLET ORAL 4 TIMES DAILY PRN
Qty: 120 TABLET | Refills: 0 | Status: SHIPPED | OUTPATIENT
Start: 2018-10-19 | End: 2018-11-13 | Stop reason: SDUPTHER

## 2018-08-22 RX ORDER — OXYCODONE HYDROCHLORIDE 15 MG/1
15 TABLET ORAL 4 TIMES DAILY PRN
Qty: 120 TABLET | Refills: 0 | Status: SHIPPED | OUTPATIENT
Start: 2018-08-24 | End: 2018-09-23

## 2018-08-22 NOTE — PROGRESS NOTES
Chronic patient Established Note (Follow up visit)      SUBJECTIVE:    Vick Gonzalez presents to the clinic for a follow-up appointment for lower back pain and medication refill. He continues to report low back that radiates down the back of his legs to his feet. This pain is shooting in nature. His back pain is constant and aching in nature. His pain is worse with prolonged walking and activity. He does report recent weight loss after starting thyroid medication. He has had multiple procedures in the past with limited relief. He is not interested in further procedures at this time. He continues to take Roxicodone 15 mg QID PRN with benefit and without adverse effects. He also takes Gabapentin with benefit. He denies any bowel or bladder incontinence. His pain today is 7/10.      Of note, the patient has a history of previous liver transplant and is monitored by hepatology.  His LFTs are chronically elevated which is believed to be due to HCV.    Pain Disability Index Review:  Last 3 PDI Scores 5/29/2018 11/30/2017 5/31/2017   Pain Disability Index (PDI) 27 32 27       Pain Medications:    - Opioids: Roxicodone (Oxycodone/Acetaminophen) 15 mg QID PRN pain    Others: Gabapentin 2400 mg daily    Opioid Contract: yes     report:  Reviewed and consistent with medication use as prescribed.    Pain Procedures:   1/15/15 Right TFESI @ L5 & S1     Physical Therapy/Home Exercise: yes, per self    Imaging:   Lumbar MRI 6/16/15  Narrative   Technique: Routine lumbar spine MRI performed without contrast.    Comparison: MRI 06/16/2015, CT 06/15/2015.    Findings:    There are postsurgical changes consistent with L5-S1 posterior instrumented fusion with bilateral pedicular screws, vertical stabilizing rods and an intervertebral disk spacer.  When compared to the MRI dated 06/16/2015 00:33, there are new postsurgical   changes consistent with left L5 hemilaminectomy.  There is a 2.5 cm ill-defined fluid collection at the  site of hemilaminectomy that is not well evaluated due to the lack of IV contrast but appears to extend anteriorly into the spinal canal and into the   left foraminal zone.  There is as possible small fluid collection within the anterior right epidural space that may also due to compression of the adjacent spinal canal.  There is stable grade I anterolistheses of L5 on S1 with persistent high signal   intensity adjacent to the left lateral aspect of the disk spacer that demonstrates moderate associated bone marrow edema of the adjacent vertebral endplates, findings that are when compared to the previous exam.  Note is made that there is an apparent   high signal intensity within the nerve roots posterior to the L5-S1 level that was not definitely present on the previous exam.    There is mild persistent height loss loss involving the L5 vertebral body, likely degenerative in nature.  Remaining vertebral body heights are well-maintained without findings to suggest acute fracture.    There is mild intervertebral disk spacing and desiccation at L4-L5 with a small circumferential disk bulge. No disk protrusion or extrusion. There is moderate bilateral facet arthropathy and mild bilateral uncomfortable and buckling at this level.  These   findings contribute to mild spinal canal stenosis and mild bilateral foraminal narrowing.    Noting aforementioned postsurgical changes, there is persistent severe bilateral foraminal narrowing that is difficult to accurately characterize due to adjacent artifact.    There is edema anterior to the L4, L5 and S1 vertebral bodies, presumably postsurgical in nature.  No drainable fluid collections identified. Visualized retroperitoneal organs are unremarkable.   Impression       Postsurgical changes of L5-S1 posterior spinal fusion and left L5 hemilaminectomy. There is an ill-defined fluid collection at the site of hemilaminectomy that is not well evaluated due to the lack of IV contrast  but appears to extend anteriorly with   suspected resulting mass effect on the spinal canal and the left foraminal zone.  There is a suspected small fluid collection within the anterior right epidural space that may contribute to additional mass effect on the adjacent spinal canal. While   findings may represent sequela of expected postsurgical changes, continued follow-up is advised to ensure improvement and/or resolution.    Persistent abnormal high signal intensity adjacent to the left lateral aspect of the intervertebral disk spacer with moderate associated bone marrow edema of the adjacent vertebral endplates. Findings are similar when compared to the previous exam could   represent postsurgical changes. Early infection could have a similar appearance and is possible. Continued follow-up is advised.    Apparent high signal intensity within the nerve roots posterior to the L5-S1 level that was not definitely present on the previous exam. Findings may be artifactual in nature however, sequela of nerve root inflammation/edema is possible noting recent   surgery.    This report has been flagged in the Epic medical record     .  CMP  Sodium   Date Value Ref Range Status   05/28/2018 135 (L) 136 - 145 mmol/L Final     Potassium   Date Value Ref Range Status   05/28/2018 4.4 3.5 - 5.1 mmol/L Final     Chloride   Date Value Ref Range Status   05/28/2018 102 95 - 110 mmol/L Final     CO2   Date Value Ref Range Status   05/28/2018 23 23 - 29 mmol/L Final     Glucose   Date Value Ref Range Status   05/28/2018 297 (H) 70 - 110 mg/dL Final     BUN, Bld   Date Value Ref Range Status   05/28/2018 13 6 - 20 mg/dL Final     Creatinine   Date Value Ref Range Status   05/28/2018 1.3 0.5 - 1.4 mg/dL Final     Calcium   Date Value Ref Range Status   05/28/2018 10.0 8.7 - 10.5 mg/dL Final     Total Protein   Date Value Ref Range Status   05/28/2018 7.7 6.0 - 8.4 g/dL Final     Albumin   Date Value Ref Range Status   07/25/2018 4.5  "3.6 - 5.1 g/dL Final     Comment:     @ Test Performed By:  [facility]  Marquis Wright M.D., Ph.D.,   [address] [place] [address]  [facility]  [id number]       Total Bilirubin   Date Value Ref Range Status   05/28/2018 0.4 0.1 - 1.0 mg/dL Final     Comment:     For infants and newborns, interpretation of results should be based  on gestational age, weight and in agreement with clinical  observations.  Premature Infant recommended reference ranges:  Up to 24 hours.............<8.0 mg/dL  Up to 48 hours............<12.0 mg/dL  3-5 days..................<15.0 mg/dL  6-29 days.................<15.0 mg/dL       Alkaline Phosphatase   Date Value Ref Range Status   05/28/2018 110 55 - 135 U/L Final     AST   Date Value Ref Range Status   05/28/2018 17 10 - 40 U/L Final     ALT   Date Value Ref Range Status   05/28/2018 27 10 - 44 U/L Final     Anion Gap   Date Value Ref Range Status   05/28/2018 10 8 - 16 mmol/L Final     eGFR if    Date Value Ref Range Status   05/28/2018 >60.0 >60 mL/min/1.73 m^2 Final     eGFR if non    Date Value Ref Range Status   05/28/2018 59.3 (A) >60 mL/min/1.73 m^2 Final     Comment:     Calculation used to obtain the estimated glomerular filtration  rate (eGFR) is the CKD-EPI equation.            Allergies:   Review of patient's allergies indicates:   Allergen Reactions    Naproxen      Other reaction(s): problems w/liver/kid    Oxaprozin      Other reaction(s): cause problems w/kid/liver       Current Medications:   Current Outpatient Medications   Medication Sig Dispense Refill    allopurinol (ZYLOPRIM) 100 MG tablet Take 1 tablet (100 mg total) by mouth 2 (two) times daily. 60 tablet 11    amitriptyline (ELAVIL) 25 MG tablet TAKE 1 TABLET (25 MG TOTAL) BY MOUTH EVERY EVENING. FOR NERVE PAIN AND SLEEP 30 tablet 5    BD ULTRA-FINE MENDY PEN NEEDLES 32 gauge x 5/32" Ndle       blood sugar diagnostic Strp " "1 strip by Misc.(Non-Drug; Combo Route) route 4 (four) times daily before meals and nightly. 100 strip 11    blood-glucose meter Misc 1 each by Misc.(Non-Drug; Combo Route) route 4 (four) times daily before meals and nightly. 1 each 0    calcium carbonate-vitamin D3 (CALTRATE 600 + D) 600 mg(1,500mg) -400 unit Chew       colchicine 0.6 mg tablet Take 1 tablet (0.6 mg total) by mouth 2 (two) times daily. 30 tablet 2    gabapentin (NEURONTIN) 800 MG tablet Take 1 tablet (800 mg total) by mouth 3 (three) times daily. 90 tablet 11    insulin aspart U-100 (NOVOLOG) 100 unit/mL InPn pen Inject 16 Units into the skin 3 (three) times daily with meals. 15 mL 11    insulin detemir U-100 (LEVEMIR FLEXTOUCH) 100 unit/mL (3 mL) SubQ InPn pen Inject 60 Units into the skin every evening. 15 mL 11    lancets Misc 1 each by Misc.(Non-Drug; Combo Route) route 4 (four) times daily before meals and nightly. 200 each 11    levothyroxine (SYNTHROID) 88 MCG tablet TAKE 1 TABLET (88 MCG TOTAL) BY MOUTH ONCE DAILY. 30 tablet 11    lisinopril (PRINIVIL,ZESTRIL) 20 MG tablet Take 1 tablet (20 mg total) by mouth once daily. 90 tablet 3    metoprolol succinate (TOPROL-XL) 200 MG 24 hr tablet Take 1 tablet (200 mg total) by mouth once daily. 30 tablet 11    multivitamin (THERAGRAN) per tablet Take 1 tablet by mouth.      NIFEdipine (ADALAT CC) 60 MG TbSR TAKE 1 TABLET (60 MG TOTAL) BY MOUTH ONCE DAILY. 90 tablet 3    NOVOFINE PLUS 32 gauge x 1/6" Ndle       oxyCODONE (ROXICODONE) 15 MG Tab Take 1 tablet (15 mg total) by mouth 4 (four) times daily as needed for Pain. 120 tablet 0    papaverine 30 mg/mL injection Add: Phentolamine 10 mg  Add: PGE1 100 mcg    Sig:  Give a TEST DOSE; 1st dose to be given under medical supervision 5 mL 0    pen needle, diabetic (BD ULTRA-FINE MENDY PEN NEEDLE) 32 gauge x 5/32" Ndle TEST WITH NOVOLOG THREE TIMES A DAY WITH MEALS AND WITH LEVEMIR AT BEDTIME 100 each 11    sertraline (ZOLOFT) 100 MG " tablet TAKE 1 TABLET (100 MG TOTAL) BY MOUTH ONCE DAILY. 30 tablet 7    sildenafil (REVATIO) 20 mg Tab Take 5 po 1 hour before sexual activity 50 tablet 11    tacrolimus (PROGRAF) 1 MG Cap TAKE 2 CAPSULES ( 2 MG TOTAL) BY MOUTH IN THE MORNING & TAKE 1 CAPSULE ( 1 MG TOTAL) IN THE EVENING 90 capsule 11    testosterone (ANDROGEL) 1 % (50 mg/5 gram) GlPk Apply 5 g topically once daily. 30 packet 5     No current facility-administered medications for this visit.        REVIEW OF SYSTEMS:    GENERAL:  No weight loss, malaise or fevers.  HEENT:  Negative for frequent or significant headaches.  NECK:  Negative for lumps, goiter, pain and significant neck swelling.  RESPIRATORY:  Negative for cough, wheezing or shortness of breath.  CARDIOVASCULAR:  Negative for chest pain, leg swelling or palpitations. H/O hypertension.  GI:  Negative for abdominal discomfort, blood in stools or black stools or change in bowel habits.  MUSCULOSKELETAL:  See HPI.  SKIN:  Negative for lesions, rash, and itching.  PSYCH:  Negative for sleep disturbance, mood disorder and recent psychosocial stressors.  HEMATOLOGY/LYMPHOLOGY:  Negative for prolonged bleeding, bruising easily or swollen nodes. H/O liver transplant.  NEURO:   No history of headaches, syncope, paralysis, seizures or tremors.  ENDO: Diabetes.   All other reviewed and negative other than HPI.    Past Medical History:  Past Medical History:   Diagnosis Date    Anemia     Anxiety     Chronic pain syndrome 7/13/2011    CKD (chronic kidney disease), stage III     Diabetes mellitus type II, uncontrolled     Discitis of lumbosacral region 1/16/2015    ED (erectile dysfunction)     Genital herpes     Gout, arthritis     History of alcohol abuse     History of hepatitis C, s/p successful Rx w/ SVR24 (cure) - 5/2018 8/23/2011    Completed 24wks Epclusa + RBV w/SVR12 - 2/2018  -     History of positive PPD, treatment status unknown     Pulmonary granulomas, negative sputum  "cultures for AFB and indeterminate quantferon test    History of substance abuse     Hypertension     Hypothyroidism     Liver replaced by transplant 8/23/2011    DATE: 12/16/2013  LIVER BIOPSY : REASON:  hep C staging  PATHOLOGY COMMITTEE NOTE/PLAN: :grade  1 / stage 1        Peptic ulcer disease        Past Surgical History:  Past Surgical History:   Procedure Laterality Date    CHOLECYSTECTOMY      LIVER TRANSPLANT  06/2010    SPINE SURGERY         Family History:  Family History   Problem Relation Age of Onset    Cancer Mother     Diabetes Mother     Heart disease Mother     Diabetes Sister     Cancer Maternal Uncle 82        colon CA    Melanoma Neg Hx     Psoriasis Neg Hx     Lupus Neg Hx     Eczema Neg Hx     Acne Neg Hx        Social History:  Social History     Socioeconomic History    Marital status:      Spouse name: None    Number of children: None    Years of education: None    Highest education level: None   Social Needs    Financial resource strain: None    Food insecurity - worry: None    Food insecurity - inability: None    Transportation needs - medical: None    Transportation needs - non-medical: None   Occupational History    None   Tobacco Use    Smoking status: Former Smoker    Smokeless tobacco: Never Used   Substance and Sexual Activity    Alcohol use: No     Comment: over 5 years ago, none currently    Drug use: No    Sexual activity: None   Other Topics Concern    None   Social History Narrative    None       OBJECTIVE:    /85   Pulse 82   Ht 6' 1" (1.854 m)   Wt 127.8 kg (281 lb 12 oz)   BMI 37.17 kg/m²     PHYSICAL EXAMINATION:    General appearance: Well appearing, in no acute distress, alert and oriented x3.  Psych:  Mood and affect appropriate.  Skin: Skin color, texture, turgor normal, no rashes or lesions, in both upper and lower body.  Head/face:  Atraumatic, normocephalic. No palpable lymph nodes.  GI: Abdomen soft and " non-tender.  Back: Straight leg raising in the sitting position is negative to radicular pain. There is pain with palpation over lumbar spine. Limited ROM with pain on flexion and extension. Positive facet loading bilaterally.   Extremities: Peripheral joint ROM is full and pain free without obvious instability or laxity in all four extremities. There is pain with palpation over bilateral SI joints. FABERs is negative bilaterally. No deformities or skin discoloration. Good capillary refill.   Musculoskeletal:  Right plantar flexion 4/5.  All other LE strength 5/5 and symmetric.  No atrophy or tone abnormalities are noted.  Neuro: Bilateral upper and lower extremity coordination and muscle stretch reflexes are physiologic and symmetric.  Plantar response are downgoing.  Decreased sensation to anterior aspect of right foot.   Gait: Antalgic- ambulates with cane.    ASSESSMENT: 62 y.o. year old male with lower back and right leg pain, consistent with the following diagnoses:     1. Postlaminectomy syndrome of lumbar region  oxyCODONE (ROXICODONE) 15 MG Tab    oxyCODONE (ROXICODONE) 15 MG Tab    oxyCODONE (ROXICODONE) 15 MG Tab   2. Chronic pain syndrome  oxyCODONE (ROXICODONE) 15 MG Tab    oxyCODONE (ROXICODONE) 15 MG Tab    oxyCODONE (ROXICODONE) 15 MG Tab   3. Lumbar radiculopathy  oxyCODONE (ROXICODONE) 15 MG Tab    oxyCODONE (ROXICODONE) 15 MG Tab    oxyCODONE (ROXICODONE) 15 MG Tab   4. Acquired spondylolisthesis  oxyCODONE (ROXICODONE) 15 MG Tab    oxyCODONE (ROXICODONE) 15 MG Tab    oxyCODONE (ROXICODONE) 15 MG Tab   5. DDD (degenerative disc disease), lumbar  oxyCODONE (ROXICODONE) 15 MG Tab    oxyCODONE (ROXICODONE) 15 MG Tab    oxyCODONE (ROXICODONE) 15 MG Tab   6. Lumbar myelopathy  oxyCODONE (ROXICODONE) 15 MG Tab    oxyCODONE (ROXICODONE) 15 MG Tab    oxyCODONE (ROXICODONE) 15 MG Tab   7. Encounter for monitoring opioid maintenance therapy  oxyCODONE (ROXICODONE) 15 MG Tab    oxyCODONE (ROXICODONE) 15 MG  Tab    oxyCODONE (ROXICODONE) 15 MG Tab    Pain Clinic Drug Screen         PLAN:     - Previous imaging was reviewed and discussed with the patient today. Recent labs reviewed with patient today.     - Patient can continue Roxicodone 15 mg QID PRN pain, #120. 3 months of prescriptions with appropriate dates was supplied today.     - The [place] Board of Pharmacy website for prescription monitoring was consulted today, and it does not suggest any deviations in conflict with the patient's controlled substance contract with our clinic. Will continue current therapy with frequent monitoring of the controlled substance database, and urine drug screens on followup. Refill approved.  Medication management by Dr. Penn.    - Continue Gabapentin 800 mg TID.     - UDS from 11/30/2017 reviewed and consistent. UDS done today.     - The patient will continue a home exercise routine to help with pain and strengthening.      - RTC in 3 months or sooner if needed.    - Counseled patient regarding the importance of constant sleeping habits and physical therapy.     - Dr. Penn was consulted on the patient and agrees with this plan.    The above plan and management options were discussed at length with patient. Patient is in agreement with the above and verbalized understanding.    Emma Batista NP  08/22/2018

## 2018-08-27 ENCOUNTER — LAB VISIT (OUTPATIENT)
Dept: LAB | Facility: HOSPITAL | Age: 63
End: 2018-08-27
Attending: INTERNAL MEDICINE
Payer: MEDICARE

## 2018-08-27 ENCOUNTER — TELEPHONE (OUTPATIENT)
Dept: UROLOGY | Facility: CLINIC | Age: 63
End: 2018-08-27

## 2018-08-27 DIAGNOSIS — Z94.4 LIVER TRANSPLANTED: ICD-10-CM

## 2018-08-27 LAB
6MAM UR QL: NOT DETECTED
7AMINOCLONAZEPAM UR QL: NOT DETECTED
A-OH ALPRAZ UR QL: NOT DETECTED
ALBUMIN SERPL BCP-MCNC: 3.5 G/DL
ALP SERPL-CCNC: 87 U/L
ALPRAZ UR QL: NOT DETECTED
ALT SERPL W/O P-5'-P-CCNC: 28 U/L
AMPHET UR QL SCN: NOT DETECTED
ANION GAP SERPL CALC-SCNC: 9 MMOL/L
ANNOTATION COMMENT IMP: ABNORMAL
ANNOTATION COMMENT IMP: ABNORMAL
AST SERPL-CCNC: 21 U/L
BARBITURATES UR QL: NOT DETECTED
BASOPHILS # BLD AUTO: 0.05 K/UL
BASOPHILS NFR BLD: 0.6 %
BILIRUB SERPL-MCNC: 0.4 MG/DL
BUN SERPL-MCNC: 11 MG/DL
BUPRENORPHINE UR QL: NOT DETECTED
BZE UR QL: NOT DETECTED
CALCIUM SERPL-MCNC: 8.8 MG/DL
CARBOXYTHC UR QL: NOT DETECTED
CARISOPRODOL UR QL: NOT DETECTED
CHLORIDE SERPL-SCNC: 107 MMOL/L
CLONAZEPAM UR QL: NOT DETECTED
CO2 SERPL-SCNC: 23 MMOL/L
CODEINE UR QL: NOT DETECTED
CREAT SERPL-MCNC: 1.2 MG/DL
CREAT UR-MCNC: >400 MG/DL (ref 20–400)
DIAZEPAM UR QL: NOT DETECTED
DIFFERENTIAL METHOD: ABNORMAL
EOSINOPHIL # BLD AUTO: 0.6 K/UL
EOSINOPHIL NFR BLD: 6.7 %
ERYTHROCYTE [DISTWIDTH] IN BLOOD BY AUTOMATED COUNT: 14.2 %
EST. GFR  (AFRICAN AMERICAN): >60 ML/MIN/1.73 M^2
EST. GFR  (NON AFRICAN AMERICAN): >60 ML/MIN/1.73 M^2
ETHYL GLUCURONIDE UR QL: NOT DETECTED
FENTANYL UR QL: NOT DETECTED
GLUCOSE SERPL-MCNC: 166 MG/DL
HCT VFR BLD AUTO: 37.2 %
HGB BLD-MCNC: 11.9 G/DL
HYDROCODONE UR QL: NOT DETECTED
HYDROMORPHONE UR QL: NOT DETECTED
IMM GRANULOCYTES # BLD AUTO: 0.03 K/UL
IMM GRANULOCYTES NFR BLD AUTO: 0.4 %
LORAZEPAM UR QL: NOT DETECTED
LYMPHOCYTES # BLD AUTO: 2.9 K/UL
LYMPHOCYTES NFR BLD: 34.6 %
MCH RBC QN AUTO: 28.5 PG
MCHC RBC AUTO-ENTMCNC: 32 G/DL
MCV RBC AUTO: 89 FL
MDA UR QL: NOT DETECTED
MDEA UR QL: NOT DETECTED
MDMA UR QL: NOT DETECTED
ME-PHENIDATE UR QL: NOT DETECTED
MEPERIDINE UR QL: NOT DETECTED
METHADONE UR QL: NOT DETECTED
METHAMPHET UR QL: NOT DETECTED
MIDAZOLAM UR QL SCN: NOT DETECTED
MONOCYTES # BLD AUTO: 0.7 K/UL
MONOCYTES NFR BLD: 7.7 %
MORPHINE UR QL: NOT DETECTED
NEUTROPHILS # BLD AUTO: 4.2 K/UL
NEUTROPHILS NFR BLD: 50 %
NORBUPRENORPHINE UR QL CFM: NOT DETECTED
NORDIAZEPAM UR QL: NOT DETECTED
NORFENTANYL UR QL: NOT DETECTED
NORHYDROCODONE UR QL CFM: NOT DETECTED
NOROXYCODONE UR QL CFM: PRESENT
NOROXYMORPHONE: PRESENT
NRBC BLD-RTO: 0 /100 WBC
OXAZEPAM UR QL: NOT DETECTED
OXYCODONE UR QL: PRESENT
OXYMORPHONE UR QL: PRESENT
PATHOLOGY STUDY: ABNORMAL
PCP UR QL: NOT DETECTED
PHENTERMINE UR QL: NOT DETECTED
PLATELET # BLD AUTO: 132 K/UL
PMV BLD AUTO: 13.6 FL
POTASSIUM SERPL-SCNC: 4.3 MMOL/L
PROPOXYPH UR QL: NOT DETECTED
PROT SERPL-MCNC: 7 G/DL
RBC # BLD AUTO: 4.18 M/UL
SERVICE CMNT-IMP: ABNORMAL
SODIUM SERPL-SCNC: 139 MMOL/L
TAPENTADOL UR QL SCN: NOT DETECTED
TAPENTADOL-O-SULF: NOT DETECTED
TEMAZEPAM UR QL: NOT DETECTED
TRAMADOL UR QL: NOT DETECTED
WBC # BLD AUTO: 8.39 K/UL
ZOLPIDEM UR QL: NOT DETECTED

## 2018-08-27 PROCEDURE — 85025 COMPLETE CBC W/AUTO DIFF WBC: CPT

## 2018-08-27 PROCEDURE — 80197 ASSAY OF TACROLIMUS: CPT

## 2018-08-27 PROCEDURE — 80053 COMPREHEN METABOLIC PANEL: CPT

## 2018-08-27 PROCEDURE — 36415 COLL VENOUS BLD VENIPUNCTURE: CPT | Mod: PO

## 2018-08-28 LAB — TACROLIMUS BLD-MCNC: 3.8 NG/ML

## 2018-08-29 ENCOUNTER — TELEPHONE (OUTPATIENT)
Dept: TRANSPLANT | Facility: CLINIC | Age: 63
End: 2018-08-29

## 2018-08-29 DIAGNOSIS — Z94.4 LIVER TRANSPLANTED: ICD-10-CM

## 2018-08-29 DIAGNOSIS — Z85.05 PERSONAL HISTORY OF MALIGNANT NEOPLASM OF LIVER: Primary | ICD-10-CM

## 2018-08-29 DIAGNOSIS — K74.60 HEPATIC CIRRHOSIS, UNSPECIFIED HEPATIC CIRRHOSIS TYPE, UNSPECIFIED WHETHER ASCITES PRESENT: ICD-10-CM

## 2018-08-29 NOTE — TELEPHONE ENCOUNTER
Letter sent, labs stable and no medication changes are needed. Repeat labs due 12/3/18 per protocol.

## 2018-09-07 ENCOUNTER — LAB VISIT (OUTPATIENT)
Dept: LAB | Facility: HOSPITAL | Age: 63
End: 2018-09-07
Attending: NURSE PRACTITIONER
Payer: MEDICARE

## 2018-09-07 DIAGNOSIS — E29.1 MALE HYPOGONADISM: ICD-10-CM

## 2018-09-07 LAB — TESTOST SERPL-MCNC: 168 NG/DL

## 2018-09-07 PROCEDURE — 36415 COLL VENOUS BLD VENIPUNCTURE: CPT | Mod: PO

## 2018-09-07 PROCEDURE — 84403 ASSAY OF TOTAL TESTOSTERONE: CPT

## 2018-09-13 ENCOUNTER — HOSPITAL ENCOUNTER (OUTPATIENT)
Dept: RADIOLOGY | Facility: HOSPITAL | Age: 63
Discharge: HOME | End: 2018-09-13
Attending: INTERNAL MEDICINE
Payer: MEDICARE

## 2018-09-13 DIAGNOSIS — Z94.4 LIVER TRANSPLANTED: ICD-10-CM

## 2018-09-13 PROCEDURE — 93975 VASCULAR STUDY: CPT | Mod: 26,,, | Performed by: RADIOLOGY

## 2018-09-13 PROCEDURE — 76705 ECHO EXAM OF ABDOMEN: CPT | Mod: 26,XS,, | Performed by: RADIOLOGY

## 2018-09-13 PROCEDURE — 93975 VASCULAR STUDY: CPT | Mod: TC

## 2018-09-13 PROCEDURE — 76705 ECHO EXAM OF ABDOMEN: CPT | Mod: TC,59

## 2018-09-14 ENCOUNTER — TELEPHONE (OUTPATIENT)
Dept: TRANSPLANT | Facility: CLINIC | Age: 63
End: 2018-09-14

## 2018-09-14 NOTE — LETTER
September 14, 2018    Vick Ray Carlos  [address] [address]             [facility] - Liver Transplant  [address]  [place] [address]  Phone: [phone] Mr. Gonzalez,    Dr. Coleman reviewed your ultrasound, and he said everything is stable.  No problems found. Great news!    Please do not hesitate to call me with any questions or concerns.    Sincerely,    Kandy Herrera RN, BSN  Liver Transplant Coordinator

## 2018-09-19 ENCOUNTER — TELEPHONE (OUTPATIENT)
Dept: UROLOGY | Facility: CLINIC | Age: 63
End: 2018-09-19

## 2018-09-19 NOTE — TELEPHONE ENCOUNTER
Patient notified that His T came back still low (168). States he is not  his Testosterone. He cant afford to pay for medication Informed patient that PA was sent to his insurance to pay for medication. He will check with his pharmacy to see if medication has been approved, then he will call to set up repeat lab.  I

## 2018-09-27 DIAGNOSIS — Z94.4 LIVER TRANSPLANTED: Primary | ICD-10-CM

## 2018-10-05 DIAGNOSIS — Z94.4 LIVER REPLACED BY TRANSPLANT: ICD-10-CM

## 2018-10-05 RX ORDER — TACROLIMUS 1 MG/1
CAPSULE ORAL
Qty: 90 CAPSULE | Refills: 11 | Status: SHIPPED | OUTPATIENT
Start: 2018-10-05 | End: 2019-10-07

## 2018-10-28 RX ORDER — ALLOPURINOL 100 MG/1
TABLET ORAL
Qty: 60 TABLET | Refills: 7 | Status: SHIPPED | OUTPATIENT
Start: 2018-10-28 | End: 2019-08-18 | Stop reason: SDUPTHER

## 2018-10-29 ENCOUNTER — OFFICE VISIT (OUTPATIENT)
Dept: FAMILY MEDICINE | Facility: CLINIC | Age: 63
End: 2018-10-29
Payer: MEDICARE

## 2018-10-29 VITALS
SYSTOLIC BLOOD PRESSURE: 136 MMHG | TEMPERATURE: 99 F | OXYGEN SATURATION: 97 % | DIASTOLIC BLOOD PRESSURE: 80 MMHG | HEIGHT: 73 IN | BODY MASS INDEX: 36.73 KG/M2 | HEART RATE: 84 BPM | WEIGHT: 277.13 LBS

## 2018-10-29 DIAGNOSIS — Z23 IMMUNIZATION DUE: ICD-10-CM

## 2018-10-29 DIAGNOSIS — Z94.4 LIVER TRANSPLANT RECIPIENT: ICD-10-CM

## 2018-10-29 DIAGNOSIS — E78.5 HYPERLIPIDEMIA, UNSPECIFIED HYPERLIPIDEMIA TYPE: ICD-10-CM

## 2018-10-29 DIAGNOSIS — E03.9 HYPOTHYROIDISM, UNSPECIFIED TYPE: ICD-10-CM

## 2018-10-29 DIAGNOSIS — N18.30 CKD (CHRONIC KIDNEY DISEASE), STAGE III: ICD-10-CM

## 2018-10-29 PROCEDURE — 90686 IIV4 VACC NO PRSV 0.5 ML IM: CPT | Mod: PBBFAC,PO

## 2018-10-29 PROCEDURE — 99999 PR PBB SHADOW E&M-EST. PATIENT-LVL V: CPT | Mod: PBBFAC,,, | Performed by: FAMILY MEDICINE

## 2018-10-29 PROCEDURE — G0009 ADMIN PNEUMOCOCCAL VACCINE: HCPCS | Mod: PBBFAC,PO

## 2018-10-29 PROCEDURE — 99215 OFFICE O/P EST HI 40 MIN: CPT | Mod: S$PBB,,, | Performed by: FAMILY MEDICINE

## 2018-10-29 PROCEDURE — 99215 OFFICE O/P EST HI 40 MIN: CPT | Mod: PBBFAC,PO | Performed by: FAMILY MEDICINE

## 2018-10-29 RX ORDER — LISINOPRIL 40 MG/1
40 TABLET ORAL DAILY
Qty: 30 TABLET | Refills: 3 | Status: SHIPPED | OUTPATIENT
Start: 2018-10-29 | End: 2019-07-01 | Stop reason: SDUPTHER

## 2018-10-29 NOTE — PATIENT INSTRUCTIONS
1. Blood pressure: INCREASE lisinopril to 40 mg daily. Continue metoprolol 200 mg daily and nifedipine 60 mg daily    2

## 2018-10-29 NOTE — PROGRESS NOTES
(Portions of this note were dictated using voice recognition software and may contain dictation related errors in spelling/grammar/syntax not found on text review)    CC:   Chief Complaint   Patient presents with    Follow-up       HPI: 62 y.o. male     HTN:   lisinopril 20 mg, metoprolol succinate 200 mg daily nifedipine 60.  Elevated blood pressure on intake at 150/94 but recheck at 136/80.  However, does get readings sometimes into the 140 systolic over 90s diastolic at home.  Compliant with therapy     DM: lantus 60+ NovoLog 16 q.a.c.  States fasting sugars can range from 90 up to 130.  Occasionally may get a 200 reading later in the afternoon.   eye exam 01/11/2018  Foot exam due.  Has chronic neuropathy.  Needs diabetic shoe prescription recovery ordered     Chronic kidney disease: Last labs as below     Chronic liver disease with cirrhosis status post liver transplant.  Follows with hepatology.  On Prograf.Reviewed recent liver ultrasound from September 13th showing mild hepatic steatosis     Hyperlipidemia, was on Zetia in the past but not currently.    Cannot take statins secondary to his liver disease     L DDD and chronic pain syndrome followed by pain management, on oxycodone, gabapentin, amitriptyline.     Additionally is on Zoloft 100 mg for anxiety     PAD  bilateral leg interventional treatment with balloon angioplasty       Past Medical History:   Diagnosis Date    Anemia     Anxiety     Chronic pain syndrome 7/13/2011    CKD (chronic kidney disease), stage III     Diabetes mellitus type II, uncontrolled     Discitis of lumbosacral region 1/16/2015    ED (erectile dysfunction)     Genital herpes     Gout, arthritis     History of alcohol abuse     History of hepatitis C, s/p successful Rx w/ SVR24 (cure) - 5/2018 8/23/2011    Completed 24wks Epclusa + RBV w/SVR12 - 2/2018  -     History of positive PPD, treatment status unknown     Pulmonary granulomas, negative sputum cultures for AFB  and indeterminate quantferon test    History of substance abuse     Hypertension     Hypothyroidism     Liver replaced by transplant 8/23/2011    DATE: 12/16/2013  LIVER BIOPSY : REASON:  hep C staging  PATHOLOGY COMMITTEE NOTE/PLAN: :grade  1 / stage 1        Peptic ulcer disease        Past Surgical History:   Procedure Laterality Date    BIOPSY, LIVER N/A 12/16/2013    Performed by [facility] at [facility] OR 2ND FLR    BIOPSY, LIVER N/A 12/10/2012    Performed by [facility] at [facility] OR 2ND FLR    CHOLECYSTECTOMY      COLONOSCOPY N/A 6/5/2015    Performed by Sundar Caraballo MD at [facility] ENDO (4TH FLR)    ALCIDES-TRANSFORAMINAL Right 1/15/2015    Per[facility] at [facility] PAIN MGT    LAMINECTOMY-LUMBAR-DECOMPRESSION L5- S1 exploration of TLIF N/A 6/16/2015    Performed by Migel Crabtree [facility] at [facility] OR 2ND FLR    LIVER TRANSPLANT  06/2010    MINIMALLY INVASIVE FUSION-TRANSLUMINAL LUMBAR INTERBODY (TLIF) Right 6/15/2015    Performed by [facility]tree [facility] at [facility] OR 2ND FLR    SPINE SURGERY         Family History   Problem Relation Age of Onset    Cancer Mother     Diabetes Mother     Heart disease Mother     Diabetes Sister     Cancer Maternal Uncle 82        colon CA    Melanoma Neg Hx     Psoriasis Neg Hx     Lupus Neg Hx     Eczema Neg Hx     Acne Neg Hx        Social History     Socioeconomic History    Marital status:      Spouse name: Not on file    Number of children: Not on file    Years of education: Not on file    Highest education level: Not on file   Social Needs    Financial resource strain: Not on file    Food insecurity - worry: Not on file    Food insecurity - inability: Not on file    Transportation needs - medical: Not on file    Transportation needs - non-medical: Not on file   Occupational History    Not on file   Tobacco Use    Smoking status: Former Smoker    Smokeless tobacco: Never Used   Substance and Sexual Activity     Alcohol use: No     Comment: over 5 years ago, none currently    Drug use: No    Sexual activity: Not on file   Other Topics Concern    Not on file   Social History Narrative    Not on file     Lab Results   Component Value Date    WBC 8.39 08/27/2018    HGB 11.9 (L) 08/27/2018    HCT 37.2 (L) 08/27/2018     (L) 08/27/2018    CHOL 165 03/28/2018    TRIG 180 (H) 03/28/2018    HDL 34 (L) 03/28/2018    ALT 28 08/27/2018    AST 21 08/27/2018     08/27/2018    K 4.3 08/27/2018     08/27/2018    CREATININE 1.2 08/27/2018    CALCIUM 8.8 08/27/2018    BUN 11 08/27/2018    CO2 23 08/27/2018    TSH 1.924 07/25/2018    PSA 0.30 09/06/2016    INR 1.0 11/01/2017    HGBA1C 8.3 (H) 03/28/2018    LDLCALC 95.0 03/28/2018     (H) 08/27/2018           ROS:  GENERAL: No fever, chills, fatigability or weight loss.  SKIN: No rashes, no itching.  HEAD: No headaches.  EYES: No visual changes  EARS: No ear pain or changes in hearing.  NOSE: No congestion or rhinorrhea.  MOUTH & THROAT: No hoarseness, change in voice, or sore throat.  NODES: Denies swollen glands.  CHEST: Denies NARAYANAN, cyanosis, wheezing, cough and sputum production.  CARDIOVASCULAR: Denies chest pain, PND, orthopnea.  ABDOMEN:  Chronic right flank pain.  URINARY: No flank pain, dysuria or hematuria.  PERIPHERAL VASCULAR: No claudication or cyanosis.  MUSCULOSKELETAL:  No acute symptoms  NEUROLOGIC:  Above    Vital signs reviewed  PE:   APPEARANCE: Well nourished, well developed, in no acute distress.    HEAD: Normocephalic, atraumatic.  EYES: PERRL. EOMI.   Conjunctivae noninjected.  EARS: TM's intact. Light reflex normal. No retraction or perforation  NOSE: Mucosa pink. Airway clear.  MOUTH & THROAT: No tonsillar enlargement. No pharyngeal erythema or exudate.   NECK: Supple with no cervical lymphadenopathy.  No carotid bruits.  No thyromegaly  CHEST: Good inspiratory effort. Lungs clear to auscultation with no wheezes or  crackles.  CARDIOVASCULAR: Normal S1, S2. No rubs, murmurs, or gallops.  ABDOMEN: Bowel sounds normal. Not distended. Soft. No tenderness or masses. No organomegaly.  EXTREMITIES: No edema, cyanosis, or clubbing.  DIABETIC FOOT EXAM: Protective Sensation (w/ 10 gram monofilament):  Right: Decreased  Left: Decreased    Visual Inspection:  Callus -  Bilateral  Hammertoes bilat  Onycho; bilat    Pedal Pulses:   Right: Diminshed  Left: Diminshed    Posterior tibialis:   Right:Diminshed  Left: Diminshed          IMPRESSION  1. Uncontrolled type 2 diabetes mellitus with diabetic neuropathy, with long-term current use of insulin    2. CKD (chronic kidney disease), stage III    3. Liver transplant recipient    4. Hypothyroidism, unspecified type    5. Hyperlipidemia, unspecified hyperlipidemia type    6. Immunization due            PLAN  Diabetes health maintenance:  -- advise regular and consistent glucose monitoring and medication compliance.  -- advise daily foot checks  -- advise yearly ophthalmologic exams  -- advise adequate dietary and exercise modification  -- advise regular dental visits  -- advise daily low-dose aspirin use if patient is not on other anticoagulants and there are no other contraindications.  -- Medication management:  Continue Lantus 60+ NovoLog 16 q.a.c..  Check labs    Hypertension:  Given some elevated Home readings will increase his lisinopril 40 mg daily.  Continue metoprolol 200 mg daily and nifedipine 60 mg daily    Dyslipidemia:  Not on statin secondary to his liver disease    Reorder diabetic shoe prescription given uncontrolled diabetes with neuropathy, calluses, hammertoes    Hypothyroidism:  Continue levothyroxine.  Recent TSH normal    Chronic liver disease with cirrhosis status post liver transplant and chronic immunosuppression.  Continue follow-up with hepatology as directed      Health maintenance:    Tetanus: around 2015?  Pneumovax: unknown, will do next year after Prevnar  today  Prevnar  : due  Flu due    Prostate screening:.  Patient declines at this time  Colonoscopy 2015

## 2018-11-13 ENCOUNTER — OFFICE VISIT (OUTPATIENT)
Dept: PAIN MEDICINE | Facility: CLINIC | Age: 63
End: 2018-11-13
Payer: MEDICARE

## 2018-11-13 VITALS
TEMPERATURE: 98 F | HEART RATE: 87 BPM | WEIGHT: 284.63 LBS | BODY MASS INDEX: 37.72 KG/M2 | DIASTOLIC BLOOD PRESSURE: 88 MMHG | HEIGHT: 73 IN | SYSTOLIC BLOOD PRESSURE: 129 MMHG

## 2018-11-13 DIAGNOSIS — F11.90 CHRONIC, CONTINUOUS USE OF OPIOIDS: ICD-10-CM

## 2018-11-13 DIAGNOSIS — M96.1 POSTLAMINECTOMY SYNDROME OF LUMBAR REGION: ICD-10-CM

## 2018-11-13 DIAGNOSIS — G89.4 CHRONIC PAIN SYNDROME: ICD-10-CM

## 2018-11-13 DIAGNOSIS — M51.36 DDD (DEGENERATIVE DISC DISEASE), LUMBAR: ICD-10-CM

## 2018-11-13 DIAGNOSIS — Z51.81 ENCOUNTER FOR MONITORING OPIOID MAINTENANCE THERAPY: ICD-10-CM

## 2018-11-13 DIAGNOSIS — G95.9 LUMBAR MYELOPATHY: ICD-10-CM

## 2018-11-13 DIAGNOSIS — Z79.891 ENCOUNTER FOR MONITORING OPIOID MAINTENANCE THERAPY: ICD-10-CM

## 2018-11-13 DIAGNOSIS — M43.10 ACQUIRED SPONDYLOLISTHESIS: ICD-10-CM

## 2018-11-13 DIAGNOSIS — M54.16 LUMBAR RADICULOPATHY: ICD-10-CM

## 2018-11-13 PROCEDURE — 99214 OFFICE O/P EST MOD 30 MIN: CPT | Mod: S$PBB,,, | Performed by: NURSE PRACTITIONER

## 2018-11-13 PROCEDURE — 99213 OFFICE O/P EST LOW 20 MIN: CPT | Mod: PBBFAC | Performed by: NURSE PRACTITIONER

## 2018-11-13 PROCEDURE — 99999 PR PBB SHADOW E&M-EST. PATIENT-LVL III: CPT | Mod: PBBFAC,,, | Performed by: NURSE PRACTITIONER

## 2018-11-13 RX ORDER — OXYCODONE HYDROCHLORIDE 15 MG/1
15 TABLET ORAL 4 TIMES DAILY PRN
Qty: 120 TABLET | Refills: 0 | Status: SHIPPED | OUTPATIENT
Start: 2019-01-11 | End: 2019-02-10

## 2018-11-13 RX ORDER — DEXTROSE 4 G
1 TABLET,CHEWABLE ORAL
Qty: 1 EACH | Refills: 0 | Status: SHIPPED | OUTPATIENT
Start: 2018-11-13 | End: 2018-11-15 | Stop reason: SDUPTHER

## 2018-11-13 RX ORDER — GABAPENTIN 800 MG/1
800 TABLET ORAL 3 TIMES DAILY
Qty: 90 TABLET | Refills: 11 | Status: SHIPPED | OUTPATIENT
Start: 2018-11-13 | End: 2020-01-06

## 2018-11-13 RX ORDER — OXYCODONE HYDROCHLORIDE 15 MG/1
15 TABLET ORAL 4 TIMES DAILY PRN
Qty: 120 TABLET | Refills: 0 | Status: SHIPPED | OUTPATIENT
Start: 2018-11-16 | End: 2018-12-16

## 2018-11-13 RX ORDER — OXYCODONE HYDROCHLORIDE 15 MG/1
15 TABLET ORAL 4 TIMES DAILY PRN
Qty: 120 TABLET | Refills: 0 | Status: SHIPPED | OUTPATIENT
Start: 2018-12-14 | End: 2020-01-03 | Stop reason: SDUPTHER

## 2018-11-13 NOTE — TELEPHONE ENCOUNTER
----- Message from Brenda Steinberg sent at 11/13/2018  1:11 PM CST -----  Contact: Wife [phone]  Patient would like to speak with you about faxing the request for diabetic shoes that was already sent to the office. Patient needs a sugar meter and test strips .Please advise

## 2018-11-13 NOTE — PROGRESS NOTES
Chronic patient Established Note (Follow up visit)      SUBJECTIVE:    Vick Gonzalez presents to the clinic for a follow-up appointment for lower back pain and medication refill. He reports increased stress at home dealing with his grandson who has been diagnosed with schizophrenia. He continues to report low back that radiates down the back of his legs to his feet. He describes this pain as shooting in nature. His pain is worse with prolonged walking and activity. He reports increased right abdominal pain along his transplant scar. He describes this pain as tender and burning. He continues to take Roxicodone 15 mg as needed with benefit and without adverse effects. He also takes Gabapentin with benefit. He denies any other health changes. His pain today is 8/10.      Pain Disability Index Review:  Last 3 PDI Scores 11/13/2018 5/29/2018 11/30/2017   Pain Disability Index (PDI) 28 27 32       Pain Medications:    - Opioids: Roxicodone (Oxycodone/Acetaminophen) 15 mg QID PRN pain    Others: Gabapentin 2400 mg daily    Opioid Contract: yes     report:  Reviewed and consistent with medication use as prescribed.    Pain Procedures:   1/15/15 Right TFESI @ L5 & S1     Physical Therapy/Home Exercise: yes, per self    Imaging:   Lumbar MRI 6/16/15  Narrative   Technique: Routine lumbar spine MRI performed without contrast.    Comparison: MRI 06/16/2015, CT 06/15/2015.    Findings:    There are postsurgical changes consistent with L5-S1 posterior instrumented fusion with bilateral pedicular screws, vertical stabilizing rods and an intervertebral disk spacer.  When compared to the MRI dated 06/16/2015 00:33, there are new postsurgical   changes consistent with left L5 hemilaminectomy.  There is a 2.5 cm ill-defined fluid collection at the site of hemilaminectomy that is not well evaluated due to the lack of IV contrast but appears to extend anteriorly into the spinal canal and into the   left foraminal zone.  There is  as possible small fluid collection within the anterior right epidural space that may also due to compression of the adjacent spinal canal.  There is stable grade I anterolistheses of L5 on S1 with persistent high signal   intensity adjacent to the left lateral aspect of the disk spacer that demonstrates moderate associated bone marrow edema of the adjacent vertebral endplates, findings that are when compared to the previous exam.  Note is made that there is an apparent   high signal intensity within the nerve roots posterior to the L5-S1 level that was not definitely present on the previous exam.    There is mild persistent height loss loss involving the L5 vertebral body, likely degenerative in nature.  Remaining vertebral body heights are well-maintained without findings to suggest acute fracture.    There is mild intervertebral disk spacing and desiccation at L4-L5 with a small circumferential disk bulge. No disk protrusion or extrusion. There is moderate bilateral facet arthropathy and mild bilateral uncomfortable and buckling at this level.  These   findings contribute to mild spinal canal stenosis and mild bilateral foraminal narrowing.    Noting aforementioned postsurgical changes, there is persistent severe bilateral foraminal narrowing that is difficult to accurately characterize due to adjacent artifact.    There is edema anterior to the L4, L5 and S1 vertebral bodies, presumably postsurgical in nature.  No drainable fluid collections identified. Visualized retroperitoneal organs are unremarkable.   Impression       Postsurgical changes of L5-S1 posterior spinal fusion and left L5 hemilaminectomy. There is an ill-defined fluid collection at the site of hemilaminectomy that is not well evaluated due to the lack of IV contrast but appears to extend anteriorly with   suspected resulting mass effect on the spinal canal and the left foraminal zone.  There is a suspected small fluid collection within the  anterior right epidural space that may contribute to additional mass effect on the adjacent spinal canal. While   findings may represent sequela of expected postsurgical changes, continued follow-up is advised to ensure improvement and/or resolution.    Persistent abnormal high signal intensity adjacent to the left lateral aspect of the intervertebral disk spacer with moderate associated bone marrow edema of the adjacent vertebral endplates. Findings are similar when compared to the previous exam could   represent postsurgical changes. Early infection could have a similar appearance and is possible. Continued follow-up is advised.    Apparent high signal intensity within the nerve roots posterior to the L5-S1 level that was not definitely present on the previous exam. Findings may be artifactual in nature however, sequela of nerve root inflammation/edema is possible noting recent   surgery.    This report has been flagged in the Epic medical record     .  CMP  Sodium   Date Value Ref Range Status   08/27/2018 139 136 - 145 mmol/L Final     Potassium   Date Value Ref Range Status   08/27/2018 4.3 3.5 - 5.1 mmol/L Final     Chloride   Date Value Ref Range Status   08/27/2018 107 95 - 110 mmol/L Final     CO2   Date Value Ref Range Status   08/27/2018 23 23 - 29 mmol/L Final     Glucose   Date Value Ref Range Status   08/27/2018 166 (H) 70 - 110 mg/dL Final     BUN, Bld   Date Value Ref Range Status   08/27/2018 11 6 - 20 mg/dL Final     Creatinine   Date Value Ref Range Status   08/27/2018 1.2 0.5 - 1.4 mg/dL Final     Calcium   Date Value Ref Range Status   08/27/2018 8.8 8.7 - 10.5 mg/dL Final     Total Protein   Date Value Ref Range Status   08/27/2018 7.0 6.0 - 8.4 g/dL Final     Albumin   Date Value Ref Range Status   08/27/2018 3.5 3.5 - 5.2 g/dL Final   07/25/2018 4.5 3.6 - 5.1 g/dL Final     Comment:     @ Test Performed By:  [employer]  Marquis Wright M.D., Ph.D., Laboratory  "Director  [address] [place] [address]  [facility]  [id number]       Total Bilirubin   Date Value Ref Range Status   08/27/2018 0.4 0.1 - 1.0 mg/dL Final     Comment:     For infants and newborns, interpretation of results should be based  on gestational age, weight and in agreement with clinical  observations.  Premature Infant recommended reference ranges:  Up to 24 hours.............<8.0 mg/dL  Up to 48 hours............<12.0 mg/dL  3-5 days..................<15.0 mg/dL  6-29 days.................<15.0 mg/dL       Alkaline Phosphatase   Date Value Ref Range Status   08/27/2018 87 55 - 135 U/L Final     AST   Date Value Ref Range Status   08/27/2018 21 10 - 40 U/L Final     ALT   Date Value Ref Range Status   08/27/2018 28 10 - 44 U/L Final     Anion Gap   Date Value Ref Range Status   08/27/2018 9 8 - 16 mmol/L Final     eGFR if    Date Value Ref Range Status   08/27/2018 >60.0 >60 mL/min/1.73 m^2 Final     eGFR if non    Date Value Ref Range Status   08/27/2018 >60.0 >60 mL/min/1.73 m^2 Final     Comment:     Calculation used to obtain the estimated glomerular filtration  rate (eGFR) is the CKD-EPI equation.            Allergies:   Review of patient's allergies indicates:   Allergen Reactions    Naproxen      Other reaction(s): problems w/liver/kid    Oxaprozin      Other reaction(s): cause problems w/kid/liver       Current Medications:   Current Outpatient Medications   Medication Sig Dispense Refill    allopurinol (ZYLOPRIM) 100 MG tablet TAKE 1 TABLET BY MOUTH TWICE A DAY 60 tablet 7    amitriptyline (ELAVIL) 25 MG tablet TAKE 1 TABLET (25 MG TOTAL) BY MOUTH EVERY EVENING. FOR NERVE PAIN AND SLEEP 30 tablet 5    BD ULTRA-FINE MENDY PEN NEEDLES 32 gauge x 5/32" Ndle       blood sugar diagnostic Strp 1 strip by Misc.(Non-Drug; Combo Route) route 4 (four) times daily before meals and nightly. 100 strip 11    blood-glucose meter Misc 1 each by Misc.(Non-Drug; Combo " "Route) route 4 (four) times daily before meals and nightly. 1 each 0    calcium carbonate-vitamin D3 (CALTRATE 600 + D) 600 mg(1,500mg) -400 unit Chew       colchicine 0.6 mg tablet Take 1 tablet (0.6 mg total) by mouth 2 (two) times daily. 30 tablet 2    gabapentin (NEURONTIN) 800 MG tablet Take 1 tablet (800 mg total) by mouth 3 (three) times daily. 90 tablet 11    insulin aspart U-100 (NOVOLOG) 100 unit/mL InPn pen Inject 16 Units into the skin 3 (three) times daily with meals. 15 mL 11    insulin detemir U-100 (LEVEMIR FLEXTOUCH) 100 unit/mL (3 mL) SubQ InPn pen Inject 60 Units into the skin every evening. 15 mL 11    lancets Misc 1 each by Misc.(Non-Drug; Combo Route) route 4 (four) times daily before meals and nightly. 200 each 11    levothyroxine (SYNTHROID) 88 MCG tablet TAKE 1 TABLET (88 MCG TOTAL) BY MOUTH ONCE DAILY. 30 tablet 11    lisinopril (PRINIVIL,ZESTRIL) 40 MG tablet Take 1 tablet (40 mg total) by mouth once daily. 30 tablet 3    metoprolol succinate (TOPROL-XL) 200 MG 24 hr tablet Take 1 tablet (200 mg total) by mouth once daily. 30 tablet 11    multivitamin (THERAGRAN) per tablet Take 1 tablet by mouth.      NIFEdipine (ADALAT CC) 60 MG TbSR TAKE 1 TABLET (60 MG TOTAL) BY MOUTH ONCE DAILY. 90 tablet 3    NOVOFINE PLUS 32 gauge x 1/6" Ndle       oxyCODONE (ROXICODONE) 15 MG Tab Take 1 tablet (15 mg total) by mouth 4 (four) times daily as needed for Pain. 120 tablet 0    papaverine 30 mg/mL injection Add: Phentolamine 10 mg  Add: PGE1 100 mcg    Sig:  Give a TEST DOSE; 1st dose to be given under medical supervision 5 mL 0    pen needle, diabetic (BD ULTRA-FINE MENDY PEN NEEDLE) 32 gauge x 5/32" Ndle TEST WITH NOVOLOG THREE TIMES A DAY WITH MEALS AND WITH LEVEMIR AT BEDTIME 100 each 11    sertraline (ZOLOFT) 100 MG tablet TAKE 1 TABLET (100 MG TOTAL) BY MOUTH ONCE DAILY. 30 tablet 7    tacrolimus (PROGRAF) 1 MG Cap TAKE 2 CAPSULES ( 2 MG TOTAL) BY MOUTH IN THE MORNING & TAKE 1 " CAPSULE ( 1 MG TOTAL) IN THE EVENING 90 capsule 11    sildenafil (REVATIO) 20 mg Tab Take 5 po 1 hour before sexual activity 50 tablet 11    testosterone (ANDROGEL) 1 % (50 mg/5 gram) GlPk Apply 5 g topically once daily. 30 packet 5     No current facility-administered medications for this visit.        REVIEW OF SYSTEMS:    GENERAL:  No weight loss, malaise or fevers.  HEENT:  Negative for frequent or significant headaches.  NECK:  Negative for lumps, goiter, pain and significant neck swelling.  RESPIRATORY:  Negative for cough, wheezing or shortness of breath.  CARDIOVASCULAR:  Negative for chest pain, leg swelling or palpitations. H/O hypertension.  GI:  Negative for abdominal discomfort, blood in stools or black stools or change in bowel habits.  MUSCULOSKELETAL:  See HPI.  SKIN:  Negative for lesions, rash, and itching.  PSYCH:  Negative for sleep disturbance, mood disorder and recent psychosocial stressors.  HEMATOLOGY/LYMPHOLOGY:  Negative for prolonged bleeding, bruising easily or swollen nodes. H/O liver transplant.  NEURO:   No history of headaches, syncope, paralysis, seizures or tremors.  ENDO: Diabetes.   All other reviewed and negative other than HPI.    Past Medical History:  Past Medical History:   Diagnosis Date    Anemia     Anxiety     Chronic pain syndrome 7/13/2011    CKD (chronic kidney disease), stage III     Diabetes mellitus type II, uncontrolled     Discitis of lumbosacral region 1/16/2015    ED (erectile dysfunction)     Genital herpes     Gout, arthritis     History of alcohol abuse     History of hepatitis C, s/p successful Rx w/ SVR24 (cure) - 5/2018 8/23/2011    Completed 24wks Epclusa + RBV w/SVR12 - 2/2018  -     History of positive PPD, treatment status unknown     Pulmonary granulomas, negative sputum cultures for AFB and indeterminate quantferon test    History of substance abuse     Hypertension     Hypothyroidism     Liver replaced by transplant 8/23/2011     DATE: 12/16/2013  LIVER BIOPSY : REASON:  hep C staging  PATHOLOGY COMMITTEE NOTE/PLAN: :grade  1 / stage 1        Peptic ulcer disease        Past Surgical History:  Past Surgical History:   Procedure Laterality Date    BIOPSY, LIVER N/A 12/16/2013    Performed [facility] OR 2ND FLR    BIOPSY, LIVER N/A 12/10/2012    Performed by [facility] OR 2ND FLR    CHOLECYSTECTOMY      COLONOSCOPY N/A 6/5/2015    Performed by Sundar Caraballo MD at [facility] ENDO (4TH FLR)    ALCIDES-TRANSFORAMINAL Right 1/15/2015    [facility] PAIN MGT    LAMINECTOMY-LUMBAR-DECOMPRESSION L5- S1 exploration of TLIF N/A 6/16/2015    [facility]    LIVER TRANSPLANT  06/2010    MINIMALLY INVASIVE FUSION-TRANSLUMINAL LUMBAR INTERBODY (TLIF) Right 6/15/2015    Performed by [facility] at [facility] OR 2ND FLR    SPINE SURGERY         Family History:  Family History   Problem Relation Age of Onset    Cancer Mother     Diabetes Mother     Heart disease Mother     Diabetes Sister     Cancer Maternal Uncle 82        colon CA    Melanoma Neg Hx     Psoriasis Neg Hx     Lupus Neg Hx     Eczema Neg Hx     Acne Neg Hx        Social History:  Social History     Socioeconomic History    Marital status:      Spouse name: None    Number of children: None    Years of education: None    Highest education level: None   Social Needs    Financial resource strain: None    Food insecurity - worry: None    Food insecurity - inability: None    Transportation needs - medical: None    Transportation needs - non-medical: None   Occupational History    None   Tobacco Use    Smoking status: Former Smoker    Smokeless tobacco: Never Used   Substance and Sexual Activity    Alcohol use: No     Comment: over 5 years ago, none currently    Drug use: No    Sexual activity: None   Other Topics Concern    None   Social History  "Narrative    None       OBJECTIVE:    /88   Pulse 87   Temp 98.1 °F (36.7 °C)   Ht 6' 1" (1.854 m)   Wt 129.1 kg (284 lb 9.8 oz)   BMI 37.55 kg/m²     PHYSICAL EXAMINATION:    General appearance: Well appearing, in no acute distress, alert and oriented x3.  Psych:  Mood and affect appropriate.  Skin: Skin color, texture, turgor normal, no rashes or lesions, in both upper and lower body.  Head/face:  Atraumatic, normocephalic. No palpable lymph nodes.  GI: Abdomen soft. TTP over right upper quadrant along scar.   Back: Straight leg raising in the sitting position is negative to radicular pain. There is pain with palpation over lumbar spine. Limited ROM with pain on extension. Positive facet loading bilaterally.   Extremities: Peripheral joint ROM is full and pain free without obvious instability or laxity in all four extremities. There is mild pain with palpation over bilateral SI joints. FABERs is negative bilaterally. No deformities or skin discoloration. Good capillary refill.   Musculoskeletal:  Right plantar flexion 4/5.  All other LE strength 5/5 and symmetric.  No atrophy or tone abnormalities are noted.  Neuro: Bilateral upper and lower extremity coordination and muscle stretch reflexes are physiologic and symmetric.  Plantar response are downgoing.  Decreased sensation to anterior aspect of right foot.   Gait: Antalgic- ambulates with cane.    ASSESSMENT: 62 y.o. year old male with lower back and right leg pain, consistent with the following diagnoses:     1. Postlaminectomy syndrome of lumbar region  oxyCODONE (ROXICODONE) 15 MG Tab    oxyCODONE (ROXICODONE) 15 MG Tab    oxyCODONE (ROXICODONE) 15 MG Tab    gabapentin (NEURONTIN) 800 MG tablet   2. Chronic pain syndrome  oxyCODONE (ROXICODONE) 15 MG Tab    oxyCODONE (ROXICODONE) 15 MG Tab    oxyCODONE (ROXICODONE) 15 MG Tab    gabapentin (NEURONTIN) 800 MG tablet   3. Lumbar radiculopathy  oxyCODONE (ROXICODONE) 15 MG Tab    oxyCODONE " (ROXICODONE) 15 MG Tab    oxyCODONE (ROXICODONE) 15 MG Tab    gabapentin (NEURONTIN) 800 MG tablet   4. Acquired spondylolisthesis  oxyCODONE (ROXICODONE) 15 MG Tab    oxyCODONE (ROXICODONE) 15 MG Tab    oxyCODONE (ROXICODONE) 15 MG Tab    gabapentin (NEURONTIN) 800 MG tablet   5. DDD (degenerative disc disease), lumbar  oxyCODONE (ROXICODONE) 15 MG Tab    oxyCODONE (ROXICODONE) 15 MG Tab    oxyCODONE (ROXICODONE) 15 MG Tab   6. Lumbar myelopathy  oxyCODONE (ROXICODONE) 15 MG Tab    oxyCODONE (ROXICODONE) 15 MG Tab    oxyCODONE (ROXICODONE) 15 MG Tab   7. Encounter for monitoring opioid maintenance therapy  oxyCODONE (ROXICODONE) 15 MG Tab    oxyCODONE (ROXICODONE) 15 MG Tab    oxyCODONE (ROXICODONE) 15 MG Tab   8. Chronic, continuous use of opioids  gabapentin (NEURONTIN) 800 MG tablet         PLAN:     - Previous imaging was reviewed and discussed with the patient today. Recent labs reviewed with patient today.     - Patient can continue Roxicodone 15 mg QID PRN pain, #120. 3 months of prescriptions with appropriate dates was supplied today.     - The patient is here today for a refill of current pain medications and they believe these provide effective pain control and improvements in quality of life.  The patient notes no serious side effects, and feels the benefits outweigh the risks.  The patient was reminded of the pain contract that they signed previously as well as the risks and benefits of the medication including possible death.  The updated [place] Board of Pharmacy prescription monitoring program was reviewed, and the patient has been found to be compliant with current treatment plan. Medication management provided by Dr. Penn.     - Continue Gabapentin 800 mg TID. Refill provided today.     - UDS from 8/22/2018 reviewed and consistent.      - The patient will continue a home exercise routine to help with pain and strengthening.      - RTC in 3 months or sooner if needed.    - Counseled patient  regarding the importance of constant sleeping habits and physical therapy.     - Dr. Penn was evaluated the patient and agrees with this plan.    The above plan and management options were discussed at length with patient. Patient is in agreement with the above and verbalized understanding.    Emma Batista NP  11/13/2018

## 2018-11-14 ENCOUNTER — TELEPHONE (OUTPATIENT)
Dept: FAMILY MEDICINE | Facility: CLINIC | Age: 63
End: 2018-11-14

## 2018-11-14 NOTE — TELEPHONE ENCOUNTER
Returned call.  Advised that I refaxed paperwork over to [employer] that was originally faxed on 11/2/18.  Advised again that if they need any additional information, they would need to contact us to let us know.  Also let patient's wife know that I called [facility].  They needed the ICD10 code not a PA.  Advised that I called in the ICD10 code and [facility] should be contacting them once processed.  Patient's wife verbalized understanding.

## 2018-11-14 NOTE — TELEPHONE ENCOUNTER
----- Message from Madison Guillen sent at 11/14/2018  3:07 PM CST -----  Contact: [phone]/Cely (wife)  Patient wife called to speak with you concerning PA needed for blood-glucose meter Misc at [facility]. Also says shoe store didn't get the fax for diabetic shoes. Please call and advise.

## 2018-11-14 NOTE — TELEPHONE ENCOUNTER
Pharmacy called asking for a new script for blood glucose meter with ICD10 code on script.  I called it in but per Medicare, they will not take verbals of ICD-10.  Please advise.

## 2018-11-15 RX ORDER — LANCETS
1 EACH MISCELLANEOUS
Qty: 200 EACH | Refills: 11 | Status: SHIPPED | OUTPATIENT
Start: 2018-11-15 | End: 2021-11-24 | Stop reason: SDUPTHER

## 2018-11-15 RX ORDER — DEXTROSE 4 G
1 TABLET,CHEWABLE ORAL
Qty: 1 EACH | Refills: 0 | Status: SHIPPED | OUTPATIENT
Start: 2018-11-15 | End: 2018-12-29 | Stop reason: SDUPTHER

## 2018-11-15 NOTE — TELEPHONE ENCOUNTER
Sent prescriptions to his pharmacy for her diabetic supplies with the associated diagnosis of uncontrolled diabetes

## 2018-12-03 ENCOUNTER — LAB VISIT (OUTPATIENT)
Dept: LAB | Facility: HOSPITAL | Age: 63
End: 2018-12-03
Attending: INTERNAL MEDICINE
Payer: MEDICARE

## 2018-12-03 DIAGNOSIS — Z94.4 LIVER TRANSPLANTED: ICD-10-CM

## 2018-12-03 DIAGNOSIS — K74.60 HEPATIC CIRRHOSIS, UNSPECIFIED HEPATIC CIRRHOSIS TYPE, UNSPECIFIED WHETHER ASCITES PRESENT: ICD-10-CM

## 2018-12-03 LAB
AFP SERPL-MCNC: 4.4 NG/ML
ALBUMIN SERPL BCP-MCNC: 3.3 G/DL
ALP SERPL-CCNC: 99 U/L
ALT SERPL W/O P-5'-P-CCNC: 30 U/L
ANION GAP SERPL CALC-SCNC: 6 MMOL/L
AST SERPL-CCNC: 19 U/L
BASOPHILS # BLD AUTO: 0.03 K/UL
BASOPHILS NFR BLD: 0.4 %
BILIRUB SERPL-MCNC: 0.3 MG/DL
BUN SERPL-MCNC: 9 MG/DL
CALCIUM SERPL-MCNC: 9.2 MG/DL
CHLORIDE SERPL-SCNC: 107 MMOL/L
CO2 SERPL-SCNC: 26 MMOL/L
CREAT SERPL-MCNC: 1.3 MG/DL
DIFFERENTIAL METHOD: ABNORMAL
EOSINOPHIL # BLD AUTO: 0.4 K/UL
EOSINOPHIL NFR BLD: 5.1 %
ERYTHROCYTE [DISTWIDTH] IN BLOOD BY AUTOMATED COUNT: 13.7 %
EST. GFR  (AFRICAN AMERICAN): >60 ML/MIN/1.73 M^2
EST. GFR  (NON AFRICAN AMERICAN): 59.3 ML/MIN/1.73 M^2
GLUCOSE SERPL-MCNC: 147 MG/DL
HCT VFR BLD AUTO: 40.5 %
HGB BLD-MCNC: 13.2 G/DL
IMM GRANULOCYTES # BLD AUTO: 0.06 K/UL
IMM GRANULOCYTES NFR BLD AUTO: 0.7 %
LYMPHOCYTES # BLD AUTO: 3.3 K/UL
LYMPHOCYTES NFR BLD: 39 %
MCH RBC QN AUTO: 28.6 PG
MCHC RBC AUTO-ENTMCNC: 32.6 G/DL
MCV RBC AUTO: 88 FL
MONOCYTES # BLD AUTO: 0.7 K/UL
MONOCYTES NFR BLD: 7.9 %
NEUTROPHILS # BLD AUTO: 4 K/UL
NEUTROPHILS NFR BLD: 46.9 %
NRBC BLD-RTO: 0 /100 WBC
PLATELET # BLD AUTO: 150 K/UL
PMV BLD AUTO: 13.5 FL
POTASSIUM SERPL-SCNC: 4.1 MMOL/L
PROT SERPL-MCNC: 7.2 G/DL
RBC # BLD AUTO: 4.61 M/UL
SODIUM SERPL-SCNC: 139 MMOL/L
WBC # BLD AUTO: 8.46 K/UL

## 2018-12-03 PROCEDURE — 80197 ASSAY OF TACROLIMUS: CPT

## 2018-12-03 PROCEDURE — 80053 COMPREHEN METABOLIC PANEL: CPT

## 2018-12-03 PROCEDURE — 82105 ALPHA-FETOPROTEIN SERUM: CPT

## 2018-12-03 PROCEDURE — 36415 COLL VENOUS BLD VENIPUNCTURE: CPT | Mod: PO

## 2018-12-03 PROCEDURE — 85025 COMPLETE CBC W/AUTO DIFF WBC: CPT

## 2018-12-04 LAB — TACROLIMUS BLD-MCNC: 6.8 NG/ML

## 2018-12-05 ENCOUNTER — TELEPHONE (OUTPATIENT)
Dept: TRANSPLANT | Facility: CLINIC | Age: 63
End: 2018-12-05

## 2018-12-05 NOTE — TELEPHONE ENCOUNTER
Letter sent, labs stable and no medication changes are needed. Repeat labs due 3/4/19 per protocol.

## 2018-12-26 RX ORDER — LEVOTHYROXINE SODIUM 88 UG/1
TABLET ORAL
Qty: 90 TABLET | Refills: 4 | Status: SHIPPED | OUTPATIENT
Start: 2018-12-26 | End: 2020-08-31 | Stop reason: SDUPTHER

## 2018-12-30 RX ORDER — BLOOD-GLUCOSE METER
EACH MISCELLANEOUS
Qty: 1 EACH | Refills: 0 | Status: SHIPPED | OUTPATIENT
Start: 2018-12-30 | End: 2022-09-07

## 2019-01-18 ENCOUNTER — PES CALL (OUTPATIENT)
Dept: ADMINISTRATIVE | Facility: CLINIC | Age: 64
End: 2019-01-18

## 2019-02-04 ENCOUNTER — TELEPHONE (OUTPATIENT)
Dept: FAMILY MEDICINE | Facility: CLINIC | Age: 64
End: 2019-02-04

## 2019-02-04 RX ORDER — INSULIN ASPART 100 [IU]/ML
20 INJECTION, SOLUTION INTRAVENOUS; SUBCUTANEOUS
Qty: 15 ML | Refills: 11 | Status: SHIPPED | OUTPATIENT
Start: 2019-02-04 | End: 2020-01-08

## 2019-02-04 NOTE — TELEPHONE ENCOUNTER
"----- Message from Samaria Hameed sent at 2/4/2019 10:37 AM CST -----  Contact: wife/[phone]  Patient's wife called to speak with [place] or any other nurse "running around up there" in regard to the patient's extremely high blood sugar    Please call as she stated this needs to be rushed.  "

## 2019-02-04 NOTE — TELEPHONE ENCOUNTER
Addison from [facility] pharmacy, patient has been out of his insulin for 2 days.  States that he has been self increasing his NovoLog to 20 units t.i.d..  My last instructions from October was to stay at 16 t.i.d. however given concerns about blood sugars in the 400 range I will send over a new prescription for 20 t.i.d. NovoLog.  Please verify patient is actually taking his Levemir insulin as well, supposed to be 60 units last time I saw him.  Of note he did not get his lab work from October that I had ordered at that visit.  It has been quite difficult in past visits to assess his blood sugar control and insulin regimen as he is a fairly poor historian with respect to his medication management

## 2019-02-04 NOTE — TELEPHONE ENCOUNTER
Advised wife, Cely, that Dr Lopez has already sent over the medication to [facility].  Patient is to take Novolog 20 units t.i.d. Confirmed that patient is taking Levemir 60 units.  Advised that labs ordered in October were never done and those were needed.  Patient scheduled for tomorrow at [facility] for labs.  Patient verbalized understanding of instructions for new prescription.

## 2019-02-05 ENCOUNTER — LAB VISIT (OUTPATIENT)
Dept: LAB | Facility: HOSPITAL | Age: 64
End: 2019-02-05
Attending: FAMILY MEDICINE
Payer: MEDICARE

## 2019-02-05 DIAGNOSIS — E78.5 HYPERLIPIDEMIA, UNSPECIFIED HYPERLIPIDEMIA TYPE: ICD-10-CM

## 2019-02-05 DIAGNOSIS — E03.9 HYPOTHYROIDISM, UNSPECIFIED TYPE: ICD-10-CM

## 2019-02-05 DIAGNOSIS — N18.30 CKD (CHRONIC KIDNEY DISEASE), STAGE III: ICD-10-CM

## 2019-02-05 DIAGNOSIS — Z94.4 LIVER TRANSPLANT RECIPIENT: ICD-10-CM

## 2019-02-05 LAB
ALBUMIN SERPL BCP-MCNC: 3.6 G/DL
ALP SERPL-CCNC: 114 U/L
ALT SERPL W/O P-5'-P-CCNC: 28 U/L
ANION GAP SERPL CALC-SCNC: 10 MMOL/L
AST SERPL-CCNC: 19 U/L
BILIRUB SERPL-MCNC: 0.5 MG/DL
BUN SERPL-MCNC: 18 MG/DL
CALCIUM SERPL-MCNC: 9.7 MG/DL
CHLORIDE SERPL-SCNC: 106 MMOL/L
CO2 SERPL-SCNC: 20 MMOL/L
CREAT SERPL-MCNC: 1.3 MG/DL
EST. GFR  (AFRICAN AMERICAN): >60 ML/MIN/1.73 M^2
EST. GFR  (NON AFRICAN AMERICAN): 58.9 ML/MIN/1.73 M^2
GLUCOSE SERPL-MCNC: 292 MG/DL
POTASSIUM SERPL-SCNC: 4.6 MMOL/L
PROT SERPL-MCNC: 7.6 G/DL
SODIUM SERPL-SCNC: 136 MMOL/L

## 2019-02-05 PROCEDURE — 80053 COMPREHEN METABOLIC PANEL: CPT

## 2019-02-05 PROCEDURE — 36415 COLL VENOUS BLD VENIPUNCTURE: CPT | Mod: PO

## 2019-02-06 ENCOUNTER — TELEPHONE (OUTPATIENT)
Dept: FAMILY MEDICINE | Facility: CLINIC | Age: 64
End: 2019-02-06

## 2019-02-06 NOTE — TELEPHONE ENCOUNTER
----- Message from Yue Alexander sent at 2/6/2019 12:00 PM CST -----  Contact: Self [phone]  Patient is calling to talk to nurse in regards to see if patients diabetic supplies were sent to the pharmacy.

## 2019-02-06 NOTE — TELEPHONE ENCOUNTER
----- Message from Tarsha Hameed sent at 2019  9:59 AM CST -----  Contact: [phone]/ [facility]  Pharmacy would like to speak with you about the following Rx. Pharmacist stated insurance won't cover 4 times daily, but 3 times daily is covered. Please advise.    1. blood sugar diagnostic Strp  Si strip by Misc.(Non-Drug; Combo Route) route 4 (four) times daily before meals and nightly.    Pharmacist request it be marked as urgent, as pt has been unable to test bs levels.

## 2019-02-06 NOTE — TELEPHONE ENCOUNTER
Pharmacy states that insurance will not approve test strips for 4 times a day, they will only approve 3 times a day.  Please advise.

## 2019-02-06 NOTE — TELEPHONE ENCOUNTER
Advised patient that prescription was sent over to pharmacy.  Explained that I had to wait until clinic was out to find a physician to be able to fill today due to Dr Lopez being out.

## 2019-02-08 ENCOUNTER — HOSPITAL ENCOUNTER (OUTPATIENT)
Dept: RADIOLOGY | Facility: HOSPITAL | Age: 64
Discharge: HOME | End: 2019-02-08
Attending: INTERNAL MEDICINE
Payer: MEDICARE

## 2019-02-08 ENCOUNTER — OFFICE VISIT (OUTPATIENT)
Dept: TRANSPLANT | Facility: CLINIC | Age: 64
End: 2019-02-08
Payer: MEDICARE

## 2019-02-08 VITALS
TEMPERATURE: 98 F | RESPIRATION RATE: 16 BRPM | HEART RATE: 92 BPM | WEIGHT: 288.38 LBS | DIASTOLIC BLOOD PRESSURE: 87 MMHG | SYSTOLIC BLOOD PRESSURE: 153 MMHG | BODY MASS INDEX: 38.22 KG/M2 | OXYGEN SATURATION: 98 % | HEIGHT: 73 IN

## 2019-02-08 DIAGNOSIS — I73.9 PAD (PERIPHERAL ARTERY DISEASE): ICD-10-CM

## 2019-02-08 DIAGNOSIS — Z94.4 LIVER TRANSPLANTED: ICD-10-CM

## 2019-02-08 DIAGNOSIS — Z94.4 STATUS POST LIVER TRANSPLANT: Primary | ICD-10-CM

## 2019-02-08 DIAGNOSIS — Z86.19 HISTORY OF HEPATITIS C: ICD-10-CM

## 2019-02-08 PROCEDURE — 99999 PR PBB SHADOW E&M-EST. PATIENT-LVL V: ICD-10-PCS | Mod: PBBFAC,,, | Performed by: INTERNAL MEDICINE

## 2019-02-08 PROCEDURE — 99215 OFFICE O/P EST HI 40 MIN: CPT | Mod: PBBFAC,25 | Performed by: INTERNAL MEDICINE

## 2019-02-08 PROCEDURE — 93975 VASCULAR STUDY: CPT | Mod: TC

## 2019-02-08 PROCEDURE — 93975 US LIVER TRANSPLANT POST: ICD-10-PCS | Mod: 26,,, | Performed by: RADIOLOGY

## 2019-02-08 PROCEDURE — 99999 PR PBB SHADOW E&M-EST. PATIENT-LVL V: CPT | Mod: PBBFAC,,, | Performed by: INTERNAL MEDICINE

## 2019-02-08 PROCEDURE — 99214 OFFICE O/P EST MOD 30 MIN: CPT | Mod: S$PBB,,, | Performed by: INTERNAL MEDICINE

## 2019-02-08 PROCEDURE — 93975 VASCULAR STUDY: CPT | Mod: 26,,, | Performed by: RADIOLOGY

## 2019-02-08 PROCEDURE — 76705 US LIVER TRANSPLANT POST: ICD-10-PCS | Mod: 26,XS,, | Performed by: RADIOLOGY

## 2019-02-08 PROCEDURE — 99214 PR OFFICE/OUTPT VISIT, EST, LEVL IV, 30-39 MIN: ICD-10-PCS | Mod: S$PBB,,, | Performed by: INTERNAL MEDICINE

## 2019-02-08 PROCEDURE — 76705 ECHO EXAM OF ABDOMEN: CPT | Mod: 26,XS,, | Performed by: RADIOLOGY

## 2019-02-08 NOTE — PROGRESS NOTES
Subjective:       Patient ID: Vick Gonzalez is a 63 y.o. male.    Chief Complaint: Liver Transplant Follow-up    HPI  I saw Vick Gonzalez today in the Post-Liver Transplant Clinic. This 62-year-old  gentleman is now 8 years post liver transplant for end-stage liver disease due to hepatitis C. He has been doing well post transplant. He feels well with no symptoms of advanced chronic liver   Disease.    His last liver bx in Dec 2013 showed stage 1 fibrosis only.  Fibroscan unsuccessful but fibrosure showed F4.    He is maintained on tacrolimus 2/1 mg daily..    Treated for HCV- ledip/sof and ribavirin but relapsed.  SVR with Epclusa + riba for  24 weeks.    LFTs- normal  Creatinine 1.3    BP managed by PCP  Body mass index is 38.04 kg/m².      Abdo US: 2/8/2019  Satisfactory Doppler assessment of the hepatic allograft.  Diffusely hyperechoic hepatic parenchyma suggesting steatosis      Lab Results   Component Value Date    ALT 28 02/05/2019    AST 19 02/05/2019     (H) 04/24/2017    ALKPHOS 114 02/05/2019    BILITOT 0.5 02/05/2019       Review of Systems   Constitutional: Negative for activity change, appetite change, fatigue, fever and unexpected weight change.   HENT: Negative.  Negative for ear pain, hearing loss, sinus pressure and tinnitus.    Eyes: Negative.  Negative for photophobia, discharge, itching and visual disturbance.   Respiratory: Negative.  Negative for cough, chest tightness and shortness of breath.    Cardiovascular: Negative for chest pain, palpitations and leg swelling.   Gastrointestinal: Negative for constipation, diarrhea, nausea and vomiting.   Genitourinary: Negative for decreased urine volume, difficulty urinating, dysuria, frequency (nocturia x 2) and urgency.   Musculoskeletal: Negative.  Negative for arthralgias, back pain, gait problem, joint swelling and myalgias.   Skin: Negative.    Neurological: Negative for dizziness, seizures, numbness and headaches.    Hematological: Negative for adenopathy. Does not bruise/bleed easily.   Psychiatric/Behavioral: Negative for dysphoric mood and sleep disturbance. The patient is not nervous/anxious.          Objective:      Physical Exam   Constitutional: He is oriented to person, place, and time. He appears well-developed and well-nourished.   HENT:   Head: Normocephalic and atraumatic.   Eyes: Conjunctivae are normal. Pupils are equal, round, and reactive to light.   Neck: Normal range of motion. No thyromegaly present.   Cardiovascular: Normal rate, regular rhythm and normal heart sounds.   No murmur heard.  Pulmonary/Chest: Effort normal and breath sounds normal. He has no wheezes.   Abdominal: Soft. Bowel sounds are normal. He exhibits no distension and no mass. There is no tenderness.   Musculoskeletal: He exhibits no edema.   Lymphadenopathy:     He has no cervical adenopathy.   Neurological: He is alert and oriented to person, place, and time.   Skin: Skin is warm. No rash noted. No erythema.   Psychiatric: He has a normal mood and affect. His behavior is normal.       Assessment:       1. Status post liver transplant    2. PAD (peripheral artery disease)    3. History of hepatitis C, s/p successful Rx w/ SVR24 (cure) - 5/2018        Plan:   No immunosuppressive changes- continue tac 2/1 mg  Presumed recurrent cirrhosis  - needs US and AFP screening every 6 months  - reminded of the need to lose weight  Clinic in 6 months.

## 2019-02-08 NOTE — LETTER
February 8, 2019        Emanuel Lopez  [address]  [address]  [place] [address]  Phone: [phone]  Fax: [phone]             [facility] - Liver Transplant  [address]  [facility] [address]  Phone: [phone]   Patient: Vick Gonzalez   MR Number: [id number]   YOB: 1955   Date of Visit: 2/8/2019       Dear Dr. Emanuel Lopez    Thank you for referring Vick Gonzalez to me for evaluation. Attached you will find relevant portions of my assessment and plan of care.    If you have questions, please do not hesitate to call me. I look forward to following Vick Gonzalez along with you.    Sincerely,    James Coleman MD    Enclosure    If you would like to receive this communication electronically, please contact [email or web address] or [phone] to request [employer] access.    [employer] is a tool which provides read-only access to select patient information with whom you have a relationship. Its easy to use and provides real time access to review your patients record including encounter summaries, notes, results, and demographic information.    If you feel you have received this communication in error or would no longer like to receive these types of communications, please e-mail [email or web address]

## 2019-02-11 DIAGNOSIS — Z94.4 LIVER TRANSPLANTED: Primary | ICD-10-CM

## 2019-02-13 ENCOUNTER — OFFICE VISIT (OUTPATIENT)
Dept: PAIN MEDICINE | Facility: CLINIC | Age: 64
End: 2019-02-13
Payer: MEDICARE

## 2019-02-13 ENCOUNTER — TELEPHONE (OUTPATIENT)
Dept: PAIN MEDICINE | Facility: CLINIC | Age: 64
End: 2019-02-13

## 2019-02-13 VITALS
HEIGHT: 73 IN | WEIGHT: 284.38 LBS | TEMPERATURE: 98 F | DIASTOLIC BLOOD PRESSURE: 93 MMHG | SYSTOLIC BLOOD PRESSURE: 148 MMHG | HEART RATE: 107 BPM | BODY MASS INDEX: 37.69 KG/M2

## 2019-02-13 DIAGNOSIS — G95.9 LUMBAR MYELOPATHY: ICD-10-CM

## 2019-02-13 DIAGNOSIS — M54.16 LUMBAR RADICULOPATHY: ICD-10-CM

## 2019-02-13 DIAGNOSIS — M51.36 DDD (DEGENERATIVE DISC DISEASE), LUMBAR: ICD-10-CM

## 2019-02-13 DIAGNOSIS — Z51.81 ENCOUNTER FOR MONITORING OPIOID MAINTENANCE THERAPY: ICD-10-CM

## 2019-02-13 DIAGNOSIS — G89.4 CHRONIC PAIN SYNDROME: ICD-10-CM

## 2019-02-13 DIAGNOSIS — M96.1 POSTLAMINECTOMY SYNDROME OF LUMBAR REGION: Primary | ICD-10-CM

## 2019-02-13 DIAGNOSIS — M43.10 ACQUIRED SPONDYLOLISTHESIS: ICD-10-CM

## 2019-02-13 DIAGNOSIS — Z79.891 ENCOUNTER FOR MONITORING OPIOID MAINTENANCE THERAPY: ICD-10-CM

## 2019-02-13 PROCEDURE — 99214 PR OFFICE/OUTPT VISIT, EST, LEVL IV, 30-39 MIN: ICD-10-PCS | Mod: S$PBB,,, | Performed by: NURSE PRACTITIONER

## 2019-02-13 PROCEDURE — 99214 OFFICE O/P EST MOD 30 MIN: CPT | Mod: S$PBB,,, | Performed by: NURSE PRACTITIONER

## 2019-02-13 PROCEDURE — 99213 OFFICE O/P EST LOW 20 MIN: CPT | Mod: PBBFAC | Performed by: NURSE PRACTITIONER

## 2019-02-13 PROCEDURE — 99999 PR PBB SHADOW E&M-EST. PATIENT-LVL III: ICD-10-PCS | Mod: PBBFAC,,, | Performed by: NURSE PRACTITIONER

## 2019-02-13 PROCEDURE — 99999 PR PBB SHADOW E&M-EST. PATIENT-LVL III: CPT | Mod: PBBFAC,,, | Performed by: NURSE PRACTITIONER

## 2019-02-13 RX ORDER — OXYCODONE HYDROCHLORIDE 15 MG/1
15 TABLET ORAL EVERY 6 HOURS PRN
Qty: 120 TABLET | Refills: 0 | Status: SHIPPED | OUTPATIENT
Start: 2019-02-13 | End: 2019-03-11 | Stop reason: SDUPTHER

## 2019-02-13 NOTE — TELEPHONE ENCOUNTER
Contacted and spoke with Elma from [employer]. Elma explained the pt came to  Rx and she had not received a RX, but has since received Rx from Dr. Penn.

## 2019-02-13 NOTE — PROGRESS NOTES
Chronic patient Established Note (Follow up visit)      SUBJECTIVE:    Vick Gonzalez presents to the clinic for a follow-up appointment for lower back pain and medication refill. He continues to report low back pain that radiates down the posterior aspect of both legs to his feet. He describes this pain as shooting and tingling in nature. This pain is worse with prolonged walking and activity. He continues report right sided abdominal pain along his transplant scar. He describes this pain as tender and cramping. He continues to report benefit with current medication regimen. He takes Gabapentin with benefit. He continues to take Roxicodone 15 mg as needed with benefit. He denies any adverse effects. His pain today is 7/10.      Pain Disability Index Review:  Last 3 PDI Scores 11/13/2018 5/29/2018 11/30/2017   Pain Disability Index (PDI) 28 27 32       Pain Medications:    - Opioids: Roxicodone (Oxycodone/Acetaminophen) 15 mg QID PRN pain    Others: Gabapentin 2400 mg daily    Opioid Contract: yes     report:  Reviewed and consistent with medication use as prescribed.    Pain Procedures:   1/15/15 Right TFESI @ L5 & S1     Physical Therapy/Home Exercise: yes, per self    Imaging:   Lumbar MRI 6/16/15  Narrative   Technique: Routine lumbar spine MRI performed without contrast.    Comparison: MRI 06/16/2015, CT 06/15/2015.    Findings:    There are postsurgical changes consistent with L5-S1 posterior instrumented fusion with bilateral pedicular screws, vertical stabilizing rods and an intervertebral disk spacer.  When compared to the MRI dated 06/16/2015 00:33, there are new postsurgical   changes consistent with left L5 hemilaminectomy.  There is a 2.5 cm ill-defined fluid collection at the site of hemilaminectomy that is not well evaluated due to the lack of IV contrast but appears to extend anteriorly into the spinal canal and into the   left foraminal zone.  There is as possible small fluid collection within  the anterior right epidural space that may also due to compression of the adjacent spinal canal.  There is stable grade I anterolistheses of L5 on S1 with persistent high signal   intensity adjacent to the left lateral aspect of the disk spacer that demonstrates moderate associated bone marrow edema of the adjacent vertebral endplates, findings that are when compared to the previous exam.  Note is made that there is an apparent   high signal intensity within the nerve roots posterior to the L5-S1 level that was not definitely present on the previous exam.    There is mild persistent height loss loss involving the L5 vertebral body, likely degenerative in nature.  Remaining vertebral body heights are well-maintained without findings to suggest acute fracture.    There is mild intervertebral disk spacing and desiccation at L4-L5 with a small circumferential disk bulge. No disk protrusion or extrusion. There is moderate bilateral facet arthropathy and mild bilateral uncomfortable and buckling at this level.  These   findings contribute to mild spinal canal stenosis and mild bilateral foraminal narrowing.    Noting aforementioned postsurgical changes, there is persistent severe bilateral foraminal narrowing that is difficult to accurately characterize due to adjacent artifact.    There is edema anterior to the L4, L5 and S1 vertebral bodies, presumably postsurgical in nature.  No drainable fluid collections identified. Visualized retroperitoneal organs are unremarkable.   Impression       Postsurgical changes of L5-S1 posterior spinal fusion and left L5 hemilaminectomy. There is an ill-defined fluid collection at the site of hemilaminectomy that is not well evaluated due to the lack of IV contrast but appears to extend anteriorly with   suspected resulting mass effect on the spinal canal and the left foraminal zone.  There is a suspected small fluid collection within the anterior right epidural space that may contribute  to additional mass effect on the adjacent spinal canal. While   findings may represent sequela of expected postsurgical changes, continued follow-up is advised to ensure improvement and/or resolution.    Persistent abnormal high signal intensity adjacent to the left lateral aspect of the intervertebral disk spacer with moderate associated bone marrow edema of the adjacent vertebral endplates. Findings are similar when compared to the previous exam could   represent postsurgical changes. Early infection could have a similar appearance and is possible. Continued follow-up is advised.    Apparent high signal intensity within the nerve roots posterior to the L5-S1 level that was not definitely present on the previous exam. Findings may be artifactual in nature however, sequela of nerve root inflammation/edema is possible noting recent   surgery.    This report has been flagged in the Epic medical record     .  CMP  Sodium   Date Value Ref Range Status   02/05/2019 136 136 - 145 mmol/L Final     Potassium   Date Value Ref Range Status   02/05/2019 4.6 3.5 - 5.1 mmol/L Final     Chloride   Date Value Ref Range Status   02/05/2019 106 95 - 110 mmol/L Final     CO2   Date Value Ref Range Status   02/05/2019 20 (L) 23 - 29 mmol/L Final     Glucose   Date Value Ref Range Status   02/05/2019 292 (H) 70 - 110 mg/dL Final     BUN, Bld   Date Value Ref Range Status   02/05/2019 18 8 - 23 mg/dL Final     Creatinine   Date Value Ref Range Status   02/05/2019 1.3 0.5 - 1.4 mg/dL Final     Calcium   Date Value Ref Range Status   02/05/2019 9.7 8.7 - 10.5 mg/dL Final     Total Protein   Date Value Ref Range Status   02/05/2019 7.6 6.0 - 8.4 g/dL Final     Albumin   Date Value Ref Range Status   02/05/2019 3.6 3.5 - 5.2 g/dL Final   07/25/2018 4.5 3.6 - 5.1 g/dL Final     Comment:     @ Test Performed By:  ezzai - how to [facility] [place]  Marquis Wright M.D., Ph.D.,   [address] [place]  "[id number]  [facility]  [id number]       Total Bilirubin   Date Value Ref Range Status   02/05/2019 0.5 0.1 - 1.0 mg/dL Final     Comment:     For infants and newborns, interpretation of results should be based  on gestational age, weight and in agreement with clinical  observations.  Premature Infant recommended reference ranges:  Up to 24 hours.............<8.0 mg/dL  Up to 48 hours............<12.0 mg/dL  3-5 days..................<15.0 mg/dL  6-29 days.................<15.0 mg/dL       Alkaline Phosphatase   Date Value Ref Range Status   02/05/2019 114 55 - 135 U/L Final     AST   Date Value Ref Range Status   02/05/2019 19 10 - 40 U/L Final     ALT   Date Value Ref Range Status   02/05/2019 28 10 - 44 U/L Final     Anion Gap   Date Value Ref Range Status   02/05/2019 10 8 - 16 mmol/L Final     eGFR if    Date Value Ref Range Status   02/05/2019 >60.0 >60 mL/min/1.73 m^2 Final     eGFR if non    Date Value Ref Range Status   02/05/2019 58.9 (A) >60 mL/min/1.73 m^2 Final     Comment:     Calculation used to obtain the estimated glomerular filtration  rate (eGFR) is the CKD-EPI equation.            Allergies:   Review of patient's allergies indicates:   Allergen Reactions    Naproxen      Other reaction(s): problems w/liver/kid    Oxaprozin      Other reaction(s): cause problems w/kid/liver       Current Medications:   Current Outpatient Medications   Medication Sig Dispense Refill    allopurinol (ZYLOPRIM) 100 MG tablet TAKE 1 TABLET BY MOUTH TWICE A DAY 60 tablet 7    amitriptyline (ELAVIL) 25 MG tablet TAKE 1 TABLET (25 MG TOTAL) BY MOUTH EVERY EVENING. FOR NERVE PAIN AND SLEEP 30 tablet 5    BD ULTRA-FINE MENDY PEN NEEDLES 32 gauge x 5/32" Ndle       blood sugar diagnostic Strp 1 strip by Misc.(Non-Drug; Combo Route) route 3 (three) times daily. 100 strip 11    calcium carbonate-vitamin D3 (CALTRATE 600 + D) 600 mg(1,500mg) -400 unit Chew       colchicine 0.6 mg tablet Take 1 " "tablet (0.6 mg total) by mouth 2 (two) times daily. 30 tablet 2    gabapentin (NEURONTIN) 800 MG tablet Take 1 tablet (800 mg total) by mouth 3 (three) times daily. 90 tablet 11    insulin aspart U-100 (NOVOLOG FLEXPEN U-100 INSULIN) 100 unit/mL InPn pen Inject 20 Units into the skin 3 (three) times daily with meals. 15 mL 11    insulin detemir U-100 (LEVEMIR FLEXTOUCH) 100 unit/mL (3 mL) SubQ InPn pen Inject 60 Units into the skin every evening. 15 mL 11    lancets Misc 1 each by Misc.(Non-Drug; Combo Route) route 4 (four) times daily before meals and nightly. 200 each 11    levothyroxine (SYNTHROID) 88 MCG tablet TAKE 1 TABLET (88 MCG TOTAL) BY MOUTH ONCE DAILY. 90 tablet 4    lisinopril (PRINIVIL,ZESTRIL) 40 MG tablet Take 1 tablet (40 mg total) by mouth once daily. 30 tablet 3    metoprolol succinate (TOPROL-XL) 200 MG 24 hr tablet Take 1 tablet (200 mg total) by mouth once daily. 30 tablet 11    multivitamin (THERAGRAN) per tablet Take 1 tablet by mouth.      NIFEdipine (ADALAT CC) 60 MG TbSR TAKE 1 TABLET (60 MG TOTAL) BY MOUTH ONCE DAILY. 90 tablet 3    NOVOFINE PLUS 32 gauge x 1/6" Ndle       sertraline (ZOLOFT) 100 MG tablet TAKE 1 TABLET (100 MG TOTAL) BY MOUTH ONCE DAILY. 30 tablet 7    sildenafil (REVATIO) 20 mg Tab Take 5 po 1 hour before sexual activity 50 tablet 11    tacrolimus (PROGRAF) 1 MG Cap TAKE 2 CAPSULES (2 MG TOTAL) BY MOUTH IN THE MORNING & TAKE 1 CAPSULE (1 MG TOTAL) IN THE EVENING 90 capsule 11    TRUE METRIX GLUCOSE METER Misc TEST 4 (FOUR) TIMES DAILY BEFORE MEALS AND NIGHTLY. 1 each 0     No current facility-administered medications for this visit.        REVIEW OF SYSTEMS:    GENERAL:  No weight loss, malaise or fevers.  HEENT:  Negative for frequent or significant headaches.  NECK:  Negative for lumps, goiter, pain and significant neck swelling.  RESPIRATORY:  Negative for cough, wheezing or shortness of breath.  CARDIOVASCULAR:  Negative for chest pain, leg swelling or " palpitations. H/O hypertension.  GI:  Negative for abdominal discomfort, blood in stools or black stools or change in bowel habits.  MUSCULOSKELETAL:  See HPI.  SKIN:  Negative for lesions, rash, and itching.  PSYCH:  Negative for sleep disturbance, mood disorder and recent psychosocial stressors.  HEMATOLOGY/LYMPHOLOGY:  Negative for prolonged bleeding, bruising easily or swollen nodes. H/O liver transplant.  NEURO:   No history of headaches, syncope, paralysis, seizures or tremors.  ENDO: Diabetes.   All other reviewed and negative other than HPI.    Past Medical History:  Past Medical History:   Diagnosis Date    Anemia     Anxiety     Chronic pain syndrome 7/13/2011    CKD (chronic kidney disease), stage III     Diabetes mellitus type II, uncontrolled     Discitis of lumbosacral region 1/16/2015    ED (erectile dysfunction)     Genital herpes     Gout, arthritis     History of alcohol abuse     History of hepatitis C, s/p successful Rx w/ SVR24 (cure) - 5/2018 8/23/2011    Completed 24wks Epclusa + RBV w/SVR12 - 2/2018  -     History of positive PPD, treatment status unknown     Pulmonary granulomas, negative sputum cultures for AFB and indeterminate quantferon test    History of substance abuse     Hypertension     Hypothyroidism     Liver replaced by transplant 8/23/2011    DATE: 12/16/2013  LIVER BIOPSY : REASON:  hep C staging  PATHOLOGY COMMITTEE NOTE/PLAN: :grade  1 / stage 1        Peptic ulcer disease        Past Surgical History:  Past Surgical History:   Procedure Laterality Date    BIOPSY, LIVER N/A 12/16/2013    Performed by [facility] OR 2ND FLR    BIOPSY, LIVER N/A 12/10/2012    Performed by [facility] OR 2ND FLR    CHOLECYSTECTOMY      COLONOSCOPY N/A 6/5/2015    Performed by Sundar Caraballo MD at [facility] ENDO (4TH FLR)    ALCIDES-TRANSFORAMINAL Right 1/15/2015    Per[facility] Thu [facility] at [facility] PAIN MGT     "LAMINECTOMY-LUMBAR-DECOMPRESSION L5- S1 exploration of TLIF N/A 6/16/2015    Per[facility] OR 2ND FLR    LIVER TRANSPLANT  06/2010    MINIMALLY INVASIVE FUSION-TRANSLUMINAL LUMBAR INTERBODY (TLIF) Right 6/15/2015    Performed by [facility] at [facility] OR 2ND FLR    SPINE SURGERY         Family History:  Family History   Problem Relation Age of Onset    Cancer Mother     Diabetes Mother     Heart disease Mother     Diabetes Sister     Cancer Maternal Uncle 82        colon CA    Melanoma Neg Hx     Psoriasis Neg Hx     Lupus Neg Hx     Eczema Neg Hx     Acne Neg Hx        Social History:  Social History     Socioeconomic History    Marital status:      Spouse name: Not on file    Number of children: Not on file    Years of education: Not on file    Highest education level: Not on file   Social Needs    Financial resource strain: Not on file    Food insecurity - worry: Not on file    Food insecurity - inability: Not on file    Transportation needs - medical: Not on file    Transportation needs - non-medical: Not on file   Occupational History    Not on file   Tobacco Use    Smoking status: Former Smoker    Smokeless tobacco: Never Used   Substance and Sexual Activity    Alcohol use: No     Comment: over 5 years ago, none currently    Drug use: No    Sexual activity: Not on file   Other Topics Concern    Not on file   Social History Narrative    Not on file       OBJECTIVE:    BP (!) 148/93   Pulse 107   Temp 98.2 °F (36.8 °C)   Ht 6' 1" (1.854 m)   Wt 129 kg (284 lb 6.3 oz)   BMI 37.52 kg/m²     PHYSICAL EXAMINATION:    General appearance: Well appearing, in no acute distress, alert and oriented x3.  Psych:  Mood and affect appropriate.  Skin: Skin color, texture, turgor normal, no rashes or lesions, in both upper and lower body.  Head/face:  Atraumatic, normocephalic. No palpable lymph nodes.  GI: Abdomen soft. TTP over right upper quadrant " along scar.   Back: Straight leg raising in the sitting position is negative to radicular pain. There is pain with palpation over lumbar spine. Limited ROM with mild pain on flexion and extension. Positive facet loading bilaterally.   Extremities: Peripheral joint ROM is full and pain free without obvious instability or laxity in all four extremities. There is mild pain with palpation over bilateral SI joints. FABERs is negative bilaterally. No deformities or skin discoloration. Good capillary refill.   Musculoskeletal:  Right plantar flexion 4/5.  All other LE strength 5/5 and symmetric.  No atrophy or tone abnormalities are noted.  Neuro: Bilateral upper and lower extremity coordination and muscle stretch reflexes are physiologic and symmetric.  Plantar response are downgoing.  Decreased sensation to anterior aspect of right foot.   Gait: Antalgic- ambulates with cane.    ASSESSMENT: 63 y.o. year old male with lower back and right leg pain, consistent with the following diagnoses:     1. Postlaminectomy syndrome of lumbar region     2. Chronic pain syndrome     3. Lumbar radiculopathy     4. Acquired spondylolisthesis     5. DDD (degenerative disc disease), lumbar     6. Lumbar myelopathy     7. Encounter for monitoring opioid maintenance therapy  Pain Clinic Drug Screen         PLAN:     - Previous imaging was reviewed and discussed with the patient today. Recent labs reviewed with patient today.     - Patient can continue Roxicodone 15 mg QID PRN pain, #120. Refill provided today. He may call for 2 additional refills.    - The patient is here today for a refill of current pain medications and they believe these provide effective pain control and improvements in quality of life.  The patient notes no serious side effects, and feels the benefits outweigh the risks.  The patient was reminded of the pain contract that they signed previously as well as the risks and benefits of the medication including possible  death.  The updated [place] Board  Pharmacy prescription monitoring program was reviewed, and the patient has been found to be compliant with current treatment plan. Medication management provided by Dr. Penn.     - Continue Gabapentin 800 mg TID.     - UDS from 8/22/2018 reviewed and consistent. Patient was unable to void today. UDS next visit.     - The patient will continue a home exercise routine to help with pain and strengthening.      - RTC in 3 months or sooner if needed.    - Counseled patient regarding the importance of constant sleeping habits and physical therapy.     - Dr. Penn was evaluated the patient and agrees with this plan.    The above plan and management options were discussed at length with patient. Patient is in agreement with the above and verbalized understanding.    Emma Batista NP  02/13/2019

## 2019-02-18 ENCOUNTER — TELEPHONE (OUTPATIENT)
Dept: TRANSPLANT | Facility: CLINIC | Age: 64
End: 2019-02-18

## 2019-02-18 NOTE — LETTER
February 18, 2019    Vick Gonzalez  [address] [facility] [address]             [facility] - Liver Transplant  [address]  [place] [address]  Phone: [phone] Mr. Gonzalez,    Your ultrasound was stable.  We will have you repeat again in 6 months - 8/2019.     Please call us with any questions or concerns.    Sincerely,    Kandy Herrera RN, BSN, [facility]  Liver Transplant Coordinator

## 2019-02-20 ENCOUNTER — TELEPHONE (OUTPATIENT)
Dept: ADMINISTRATIVE | Facility: HOSPITAL | Age: 64
End: 2019-02-20

## 2019-02-20 DIAGNOSIS — Z13.5 ENCOUNTER FOR SCREENING FOR DIABETIC RETINOPATHY: Primary | ICD-10-CM

## 2019-02-22 ENCOUNTER — CLINICAL SUPPORT (OUTPATIENT)
Dept: FAMILY MEDICINE | Facility: CLINIC | Age: 64
End: 2019-02-22
Attending: FAMILY MEDICINE
Payer: MEDICARE

## 2019-02-22 ENCOUNTER — OFFICE VISIT (OUTPATIENT)
Dept: FAMILY MEDICINE | Facility: CLINIC | Age: 64
End: 2019-02-22
Payer: MEDICARE

## 2019-02-22 ENCOUNTER — TELEPHONE (OUTPATIENT)
Dept: FAMILY MEDICINE | Facility: CLINIC | Age: 64
End: 2019-02-22

## 2019-02-22 VITALS
TEMPERATURE: 99 F | OXYGEN SATURATION: 97 % | BODY MASS INDEX: 37.93 KG/M2 | SYSTOLIC BLOOD PRESSURE: 123 MMHG | HEART RATE: 84 BPM | HEIGHT: 73 IN | DIASTOLIC BLOOD PRESSURE: 74 MMHG | WEIGHT: 286.19 LBS

## 2019-02-22 DIAGNOSIS — E11.65 UNCONTROLLED TYPE 2 DIABETES MELLITUS WITH HYPERGLYCEMIA: Primary | ICD-10-CM

## 2019-02-22 DIAGNOSIS — E66.01 SEVERE OBESITY (BMI 35.0-35.9 WITH COMORBIDITY): ICD-10-CM

## 2019-02-22 DIAGNOSIS — I10 HYPERTENSION, UNSPECIFIED TYPE: ICD-10-CM

## 2019-02-22 DIAGNOSIS — Z13.5 ENCOUNTER FOR SCREENING FOR DIABETIC RETINOPATHY: ICD-10-CM

## 2019-02-22 DIAGNOSIS — D84.9 IMMUNOSUPPRESSION: ICD-10-CM

## 2019-02-22 DIAGNOSIS — N18.30 CKD (CHRONIC KIDNEY DISEASE), STAGE III: ICD-10-CM

## 2019-02-22 PROCEDURE — 99214 OFFICE O/P EST MOD 30 MIN: CPT | Mod: PBBFAC,PO | Performed by: FAMILY MEDICINE

## 2019-02-22 PROCEDURE — 99212 OFFICE O/P EST SF 10 MIN: CPT | Mod: PBBFAC,27,PO,25

## 2019-02-22 PROCEDURE — 92250 FUNDUS PHOTOGRAPHY W/I&R: CPT | Mod: PBBFAC,PO

## 2019-02-22 PROCEDURE — 99999 PR PBB SHADOW E&M-EST. PATIENT-LVL II: ICD-10-PCS | Mod: PBBFAC,,,

## 2019-02-22 PROCEDURE — 99999 PR PBB SHADOW E&M-EST. PATIENT-LVL IV: CPT | Mod: PBBFAC,,, | Performed by: FAMILY MEDICINE

## 2019-02-22 PROCEDURE — 92250 DIABETIC EYE SCREENING PHOTO: ICD-10-PCS | Mod: 26,S$PBB,, | Performed by: OPHTHALMOLOGY

## 2019-02-22 PROCEDURE — 99214 PR OFFICE/OUTPT VISIT, EST, LEVL IV, 30-39 MIN: ICD-10-PCS | Mod: S$PBB,,, | Performed by: FAMILY MEDICINE

## 2019-02-22 PROCEDURE — 99999 PR PBB SHADOW E&M-EST. PATIENT-LVL IV: ICD-10-PCS | Mod: PBBFAC,,, | Performed by: FAMILY MEDICINE

## 2019-02-22 PROCEDURE — 99214 OFFICE O/P EST MOD 30 MIN: CPT | Mod: S$PBB,,, | Performed by: FAMILY MEDICINE

## 2019-02-22 PROCEDURE — 99999 PR PBB SHADOW E&M-EST. PATIENT-LVL II: CPT | Mod: PBBFAC,,,

## 2019-02-22 PROCEDURE — 92250 FUNDUS PHOTOGRAPHY W/I&R: CPT | Mod: 26,S$PBB,, | Performed by: OPHTHALMOLOGY

## 2019-02-22 NOTE — PATIENT INSTRUCTIONS
Increase levemir up to 70 units daily (fasting sugar goal less than 140)      Continue novolog at 20 units per meal     Sliding Scale for Novolog             Blood Glucose reading    :     how many extra units of Novolog to take                150-200                    :           +2                 200-250  : +4      250-300  : +6                 300-350  :  +8      >350   :           +10        SAMPLE BLOOD SUGAR CHART                 DATE  Before breakfast Before lunch Before dinner Before bedtime

## 2019-02-22 NOTE — LETTER
February 22, 2019      Other  [address]  [place]  [facility] [address]           [address]  [place] [address]  Phone: [phone]  Fax: [phone]          Patient: Vick Gonzalez   MR Number: [id number]   YOB: 1955   Date of Visit: 2/22/2019       Dear Other:    Thank you for referring Vick Gonzalez to me for evaluation. Attached you will find relevant portions of my assessment and plan of care.    If you have questions, please do not hesitate to call me. I look forward to following Vick Gonzalez along with you.    Sincerely,    Emanuel Lopez MD    Enclosure  CC:  No Recipients    If you would like to receive this communication electronically, please contact [email or web address] or [phone] to request more information on [employer] Link access.    For providers and/or their staff who would like to refer a patient to [facility], please contact us through our one-stop-shop provider referral line, [facility], at [phone].    If you feel you have received this communication in error or would no longer like to receive these types of communications, please e-mail [email or web address]

## 2019-02-22 NOTE — PROGRESS NOTES
Vick Gonzalez is a 63 y.o. male here for a diabetic eye screening with non-dilated fundus photos per dr garcia.    Patient cooperative?: Yes  Small pupils?: Yes  Last eye exam: 1year ago    For exam results, see Encounter Report.

## 2019-02-22 NOTE — PROGRESS NOTES
(Portions of this note were dictated using voice recognition software and may contain dictation related errors in spelling/grammar/syntax not found on text review)    CC:   Chief Complaint   Patient presents with    Blood Sugar Problem     took sugar at 12 it was 145.       HPI: 63 y.o. male Last seen by me in October 2018    Diabetes:  Uncontrolled, significant hyperglycemia.  His recommended insulin dosage at the time my last visit was Levemir 60 units daily plus NovoLog 16 units  t.i.d. q.a.c..  At that time and reported sugars from 90 up to 130 with occasional 200 reading later in the afternoon.  He has complicating chronic kidney disease stage 3 and multiple other medical issues listed below.  Patient called earlier this month concerned about significant worsening of hyperglycemia.  There was some confusion about what his medication regimen is supposed to be.  I had instructed to increase NovoLog to 20 t.i.d. with meals, confirm daily usage of 60 units of Levemir daily.  Had not gone for labs since my last appointment.  He had some of these prior ordered labs done but looks like A1c order and lipid order were not scheduled with these labs and up-to-date results  not available for review. He was on metformin at 1 time in 2016 but had been admitted  with hyperglycemic hyperosmolar state in 2017, medication therapy converted over to basal/prandial insulin    States that morning sugars will range between 140 and 210.  Mealtime sugars may very but does admit that if he eats a lot of sweets and will get above 400.  He does admit that this is highly dependent on his eating habits and feels like he really needs to stay away from a lot of the high sugar foods and work on losing weight.  Offered diabetic education/nutrition and hopefully he and his wife can go as his wife is the 1 that prepares a lot of his meals for him.    Ft exam up-to-date 10/29/2018  Eye exam:  Retinal camera ordered  Does get neuropathy symptoms  that we discussed likely is on count of his diabetes    HTN:   lisinopril 20 mg, metoprolol succinate 200 mg daily nifedipine 60.  Elevated blood pressure on intake at 150/94 but recheck at 136/80.  However, does get readings sometimes into the 140 systolic over 90s diastolic at home.  Compliant with therapy     Chronic liver disease with cirrhosis status post liver transplant.  Follows with hepatology.  On Prograf.Reviewed recent liver ultrasound from February 2019 showing hepatic steatosis     Hyperlipidemia, was on Zetia in the past but not currently.    Cannot take statins secondary to his liver disease     L DDD and chronic pain syndrome followed by pain management, on oxycodone, gabapentin, amitriptyline.     Additionally is on Zoloft 100 mg for anxiety     PAD  bilateral leg interventional treatment with balloon angioplasty         Past Medical History:   Diagnosis Date    Anemia     Anxiety     Chronic pain syndrome 7/13/2011    CKD (chronic kidney disease), stage III     Diabetes mellitus type II, uncontrolled     Discitis of lumbosacral region 1/16/2015    ED (erectile dysfunction)     Genital herpes     Gout, arthritis     History of alcohol abuse     History of hepatitis C, s/p successful Rx w/ SVR24 (cure) - 5/2018 8/23/2011    Completed 24wks Epclusa + RBV w/SVR12 - 2/2018  -     History of positive PPD, treatment status unknown     Pulmonary granulomas, negative sputum cultures for AFB and indeterminate quantferon test    History of substance abuse     Hypertension     Hypothyroidism     Liver replaced by transplant 8/23/2011    DATE: 12/16/2013  LIVER BIOPSY : REASON:  hep C staging  PATHOLOGY COMMITTEE NOTE/PLAN: :grade  1 / stage 1        Pancreatitis 2016    Peptic ulcer disease        Past Surgical History:   Procedure Laterality Date    BIOPSY, LIVER N/A 12/16/2013    [facility]    BIOPSY, LIVER N/A 12/10/2012    Performed by [facility] at [facility] OR 2ND FLR    CHOLECYSTECTOMY      COLONOSCOPY N/A 6/5/2015    Performed by Sund[facility]aballo [facility] at [facility] ENDO (4TH FLR)    ALCIDES-TRANSFORAMINAL Right 1/15/2015    [facility] at [facility] PAIN MGT    LAMINECTOMY-LUMBAR-DECOMPRESSION L5- S1 exploration of TLIF N/A 6/16/2015    Per[facility] by [facility] at [facility] OR 2ND FLR    LIVER TRANSPLANT  06/2010    MINIMALLY INVASIVE FUSION-TRANSLUMINAL LUMBAR INTERBODY (TLIF) Right 6/15/2015    Per[facility] at [facility] OR 2ND FLR    SPINE SURGERY         Family History   Problem Relation Age of Onset    Cancer Mother     Diabetes Mother     Heart disease Mother     Diabetes Sister     Cancer Maternal Uncle 82        colon CA    Melanoma Neg Hx     Psoriasis Neg Hx     Lupus Neg Hx     Eczema Neg Hx     Acne Neg Hx        Social History     Socioeconomic History    Marital status:      Spouse name: Not on file    Number of children: Not on file    Years of education: Not on file    Highest education level: Not on file   Social Needs    Financial resource strain: Not on file    Food insecurity - worry: Not on file    Food insecurity - inability: Not on file    Transportation needs - medical: Not on file    Transportation needs - non-medical: Not on file   Occupational History    Not on file   Tobacco Use    Smoking status: Former Smoker    Smokeless tobacco: Never Used   Substance and Sexual Activity    Alcohol use: No     Comment: over 5 years ago, none currently    Drug use: No    Sexual activity: Not on file   Other Topics Concern    Not on file   Social History Narrative    Not on file     Lab Results   Component Value Date    WBC 8.46 12/03/2018    HGB 13.2 (L) 12/03/2018    HCT 40.5 12/03/2018     12/03/2018    CHOL 165 03/28/2018    TRIG 180 (H) 03/28/2018    HDL 34 (L) 03/28/2018    ALT 28 02/05/2019    AST 19 02/05/2019     02/05/2019    K 4.6 02/05/2019    CL  106 02/05/2019    CREATININE 1.3 02/05/2019    CALCIUM 9.7 02/05/2019    ALBUMIN 3.6 02/05/2019    BUN 18 02/05/2019    CO2 20 (L) 02/05/2019    TSH 1.924 07/25/2018    PSA 0.30 09/06/2016    INR 1.0 11/01/2017    HGBA1C 8.3 (H) 03/28/2018    LDLCALC 95.0 03/28/2018     (H) 02/05/2019         Hemoglobin A1C   Date Value Ref Range Status   03/28/2018 8.3 (H) 4.0 - 5.6 % Final     Comment:     According to ADA guidelines, hemoglobin A1c <7.0% represents  optimal control in non-pregnant diabetic patients. Different  metrics may apply to specific patient populations.   Standards of Medical Care in Diabetes-2016.  For the purpose of screening for the presence of diabetes:  <5.7%     Consistent with the absence of diabetes  5.7-6.4%  Consistent with increasing risk for diabetes   (prediabetes)  >or=6.5%  Consistent with diabetes  Currently, no consensus exists for use of hemoglobin A1c  for diagnosis of diabetes for children.  This Hemoglobin A1c assay has significant interference with fetal   hemoglobin   (HbF). The results are invalid for patients with abnormal amounts of   HbF,   including those with known Hereditary Persistence   of Fetal Hemoglobin. Heterozygous hemoglobin variants (HbAS, HbAC,   HbAD, HbAE, HbA2) do not significantly interfere with this assay;   however, presence of multiple variants in a sample may impact the %   interference.     10/02/2017 7.2 (H) 4.0 - 5.6 % Final     Comment:     According to ADA guidelines, hemoglobin A1c <7.0% represents  optimal control in non-pregnant diabetic patients. Different  metrics may apply to specific patient populations.   Standards of Medical Care in Diabetes-2016.  For the purpose of screening for the presence of diabetes:  <5.7%     Consistent with the absence of diabetes  5.7-6.4%  Consistent with increasing risk for diabetes   (prediabetes)  >or=6.5%  Consistent with diabetes  Currently, no consensus exists for use of hemoglobin A1c  for diagnosis of  diabetes for children.  This Hemoglobin A1c assay has significant interference with fetal   hemoglobin   (HbF). The results are invalid for patients with abnormal amounts of   HbF,   including those with known Hereditary Persistence   of Fetal Hemoglobin. Heterozygous hemoglobin variants (HbAS, HbAC,   HbAD, HbAE, HbA2) do not significantly interfere with this assay;   however, presence of multiple variants in a sample may impact the %   interference.     02/21/2017 9.0 (H) 4.5 - 6.2 % Final     Comment:     According to ADA guidelines, hemoglobin A1C <7.0% represents  optimal control in non-pregnant diabetic patients.  Different  metrics may apply to specific populations.   Standards of Medical Care in Diabetes - 2016.  For the purpose of screening for the presence of diabetes:  <5.7%     Consistent with the absence of diabetes  5.7-6.4%  Consistent with increasing risk for diabetes   (prediabetes)  >or=6.5%  Consistent with diabetes  Currently no consensus exists for use of hemoglobin A1C  for diagnosis of diabetes for children.     10/04/2016 13.9 (H) 4.5 - 6.2 % Final     Comment:     According to ADA guidelines, hemoglobin A1C <7.0% represents  optimal control in non-pregnant diabetic patients.  Different  metrics may apply to specific populations.   Standards of Medical Care in Diabetes - 2016.  For the purpose of screening for the presence of diabetes:  <5.7%     Consistent with the absence of diabetes  5.7-6.4%  Consistent with increasing risk for diabetes   (prediabetes)  >or=6.5%  Consistent with diabetes  Currently no consensus exists for use of hemoglobin A1C  for diagnosis of diabetes for children.     09/06/2016 8.3 (H) 4.5 - 6.2 % Final     Comment:     According to ADA guidelines, hemoglobin A1C <7.0% represents  optimal control in non-pregnant diabetic patients.  Different  metrics may apply to specific populations.   Standards of Medical Care in Diabetes - 2016.  For the purpose of screening for  "the presence of diabetes:  <5.7%     Consistent with the absence of diabetes  5.7-6.4%  Consistent with increasing risk for diabetes   (prediabetes)  >or=6.5%  Consistent with diabetes  Currently no consensus exists for use of hemoglobin A1C  for diagnosis of diabetes for children.     01/19/2016 5.7 4.5 - 6.2 % Final   10/25/2010 5.6 4.0 - 6.2 % Final   07/15/2010 6.7 (H) 4.0 - 6.2 % Final   06/09/2010 <4.0 4.0 - 6.2 % Final   05/30/2010 <4.0 4.0 - 6.2 % Final     Comment:     Hemoglobin A1C:   UNABLE TO CALCULATE "EAGLU" DUE TO HGB A1C AS LESS THAN LINEARITY          ROS:  GENERAL: No fever, chills, fatigability or weight loss.  SKIN: No rashes, no itching.  HEAD: No headaches.  EYES: No visual changes  EARS: No ear pain or changes in hearing.  NOSE: No congestion or rhinorrhea.  MOUTH & THROAT: No hoarseness, change in voice, or sore throat.  NODES: Denies swollen glands.  CHEST: Denies NARAYANAN, cyanosis, wheezing, cough and sputum production.  CARDIOVASCULAR: Denies chest pain, PND, orthopnea.  ABDOMEN:  Chronic abdominal pain right upper quadrant after his liver surgery  URINARY: No flank pain, dysuria or hematuria.  PERIPHERAL VASCULAR: No claudication or cyanosis.  MUSCULOSKELETAL:  Above.  NEUROLOGIC:  Above.    Vital signs reviewed  PE:   APPEARANCE: Well nourished, well developed, in no acute distress.    HEAD: Normocephalic, atraumatic.  EYES:  Conjunctivae noninjected.  CHEST: Good inspiratory effort. Lungs clear to auscultation with no wheezes or crackles.  CARDIOVASCULAR: Normal S1, S2. No rubs, murmurs, or gallops.  ABDOMEN: Bowel sounds normal.  Chronic Distended, right upper quadrant tenderness, unchanged  EXTREMITIES: No edema    IMPRESSION  1. Uncontrolled type 2 diabetes mellitus with hyperglycemia    2. CKD (chronic kidney disease), stage III    3. Hypertension, unspecified type    4. Severe obesity (BMI 35.0-35.9 with comorbidity)    5. Immunosuppression            PLAN  Orders Placed This " Encounter   Procedures    Basic metabolic panel    Hemoglobin A1c    Lipid panel    Ambulatory consult to Diabetic Education       Reschedule A1c lipids and BMP    Increase Levemir to 70 units daily.  Continue NovoLog 20 t.i.d..  Counseling on weight loss, healthy diet, trying to get some regular exercise.  Diabetic education with nutrition counseling offered.  Advise he and his wife can both go to this to get better idea on proper dietary modification for diabetes.  Asks about that shot that helps he lose weight and .  Discussed role of G LP 1 agonist therapy although rib chart review demonstrates that he has had history of elevated lipase concerning for pancreatitis in 2016 in which case G LP 1 therapy would technically be contraindicated    Hypertension stable

## 2019-02-22 NOTE — TELEPHONE ENCOUNTER
Reviewed his eye camera results.  Looks like right eye could not completely be visualized for retinopathy eval.  Would recommend follow-up with Optometry for full dilated eye exam over the next few months.

## 2019-02-25 ENCOUNTER — LAB VISIT (OUTPATIENT)
Dept: LAB | Facility: HOSPITAL | Age: 64
End: 2019-02-25
Attending: FAMILY MEDICINE
Payer: MEDICARE

## 2019-02-25 ENCOUNTER — TELEPHONE (OUTPATIENT)
Dept: FAMILY MEDICINE | Facility: CLINIC | Age: 64
End: 2019-02-25

## 2019-02-25 DIAGNOSIS — E11.65 UNCONTROLLED TYPE 2 DIABETES MELLITUS WITH HYPERGLYCEMIA: ICD-10-CM

## 2019-02-25 DIAGNOSIS — N18.30 CKD (CHRONIC KIDNEY DISEASE), STAGE III: ICD-10-CM

## 2019-02-25 DIAGNOSIS — I10 HYPERTENSION, UNSPECIFIED TYPE: ICD-10-CM

## 2019-02-25 LAB
ANION GAP SERPL CALC-SCNC: 8 MMOL/L
BUN SERPL-MCNC: 21 MG/DL
CALCIUM SERPL-MCNC: 9.6 MG/DL
CHLORIDE SERPL-SCNC: 106 MMOL/L
CHOLEST SERPL-MCNC: 170 MG/DL
CHOLEST/HDLC SERPL: 6.8 {RATIO}
CO2 SERPL-SCNC: 23 MMOL/L
CREAT SERPL-MCNC: 1.7 MG/DL
EST. GFR  (AFRICAN AMERICAN): 49 ML/MIN/1.73 M^2
EST. GFR  (NON AFRICAN AMERICAN): 43 ML/MIN/1.73 M^2
ESTIMATED AVG GLUCOSE: 237 MG/DL
GLUCOSE SERPL-MCNC: 216 MG/DL
HBA1C MFR BLD HPLC: 9.9 %
HDLC SERPL-MCNC: 25 MG/DL
HDLC SERPL: 14.7 %
LDLC SERPL CALC-MCNC: 88.2 MG/DL
NONHDLC SERPL-MCNC: 145 MG/DL
POTASSIUM SERPL-SCNC: 4.7 MMOL/L
SODIUM SERPL-SCNC: 137 MMOL/L
TRIGL SERPL-MCNC: 284 MG/DL

## 2019-02-25 PROCEDURE — 80048 BASIC METABOLIC PNL TOTAL CA: CPT

## 2019-02-25 PROCEDURE — 80061 LIPID PANEL: CPT

## 2019-02-25 PROCEDURE — 36415 COLL VENOUS BLD VENIPUNCTURE: CPT

## 2019-02-25 PROCEDURE — 83036 HEMOGLOBIN GLYCOSYLATED A1C: CPT

## 2019-02-25 NOTE — TELEPHONE ENCOUNTER
Called patient to notify of results.  Advised patient that he will receive call to schedule optometry appt.

## 2019-03-01 ENCOUNTER — TELEPHONE (OUTPATIENT)
Dept: UROLOGY | Facility: CLINIC | Age: 64
End: 2019-03-01

## 2019-03-01 DIAGNOSIS — N52.1 TYPE 2 DIABETES MELLITUS WITH CIRCULATORY DISORDER CAUSING ERECTILE DYSFUNCTION: Primary | ICD-10-CM

## 2019-03-01 DIAGNOSIS — E11.59 TYPE 2 DIABETES MELLITUS WITH CIRCULATORY DISORDER CAUSING ERECTILE DYSFUNCTION: Primary | ICD-10-CM

## 2019-03-01 RX ORDER — SILDENAFIL CITRATE 20 MG/1
TABLET ORAL
Qty: 50 TABLET | Refills: 11 | Status: SHIPPED | OUTPATIENT
Start: 2019-03-01 | End: 2019-04-17 | Stop reason: SDUPTHER

## 2019-03-01 NOTE — TELEPHONE ENCOUNTER
----- Message from Brook Harper LPN sent at 3/1/2019  8:59 AM CST -----  Contact: logan Ta [phone]  Last seen Aug 2018  ----- Message -----  From: Cecilia Soni RN  Sent: 2/28/2019   5:42 PM  To: Brook Harper LPN        ----- Message -----  From: Lisa Pan  Sent: 2/28/2019   2:17 PM  To: Mari COPE Staff    Rx Refill/Request     Is this a Refill or New Rx:  Refill  Rx Name and Strength:  sildenafil (REVATIO) 20 mg Tab  Preferred Pharmacy with phone number:   [facility] [phone]       Communication Preference: call pt wife [phone]  Additional Information:

## 2019-03-04 ENCOUNTER — LAB VISIT (OUTPATIENT)
Dept: LAB | Facility: HOSPITAL | Age: 64
End: 2019-03-04
Attending: INTERNAL MEDICINE
Payer: MEDICARE

## 2019-03-04 ENCOUNTER — TELEPHONE (OUTPATIENT)
Dept: OPTOMETRY | Facility: CLINIC | Age: 64
End: 2019-03-04

## 2019-03-04 DIAGNOSIS — Z94.4 LIVER TRANSPLANTED: ICD-10-CM

## 2019-03-04 LAB
ALBUMIN SERPL BCP-MCNC: 3.7 G/DL
ALP SERPL-CCNC: 97 U/L
ALT SERPL W/O P-5'-P-CCNC: 23 U/L
ANION GAP SERPL CALC-SCNC: 10 MMOL/L
AST SERPL-CCNC: 17 U/L
BASOPHILS # BLD AUTO: 0.03 K/UL
BASOPHILS NFR BLD: 0.4 %
BILIRUB SERPL-MCNC: 0.5 MG/DL
BUN SERPL-MCNC: 19 MG/DL
CALCIUM SERPL-MCNC: 9.4 MG/DL
CHLORIDE SERPL-SCNC: 104 MMOL/L
CO2 SERPL-SCNC: 23 MMOL/L
CREAT SERPL-MCNC: 1.4 MG/DL
DIFFERENTIAL METHOD: ABNORMAL
EOSINOPHIL # BLD AUTO: 0.5 K/UL
EOSINOPHIL NFR BLD: 5.8 %
ERYTHROCYTE [DISTWIDTH] IN BLOOD BY AUTOMATED COUNT: 14.1 %
EST. GFR  (AFRICAN AMERICAN): >60 ML/MIN/1.73 M^2
EST. GFR  (NON AFRICAN AMERICAN): 53.8 ML/MIN/1.73 M^2
GLUCOSE SERPL-MCNC: 265 MG/DL
HCT VFR BLD AUTO: 41.1 %
HGB BLD-MCNC: 13.2 G/DL
IMM GRANULOCYTES # BLD AUTO: 0.02 K/UL
IMM GRANULOCYTES NFR BLD AUTO: 0.3 %
LYMPHOCYTES # BLD AUTO: 3.3 K/UL
LYMPHOCYTES NFR BLD: 42.4 %
MCH RBC QN AUTO: 28.9 PG
MCHC RBC AUTO-ENTMCNC: 32.1 G/DL
MCV RBC AUTO: 90 FL
MONOCYTES # BLD AUTO: 0.6 K/UL
MONOCYTES NFR BLD: 7.8 %
NEUTROPHILS # BLD AUTO: 3.4 K/UL
NEUTROPHILS NFR BLD: 43.3 %
NRBC BLD-RTO: 0 /100 WBC
PLATELET # BLD AUTO: 138 K/UL
PMV BLD AUTO: 13 FL
POTASSIUM SERPL-SCNC: 4.5 MMOL/L
PROT SERPL-MCNC: 7.5 G/DL
RBC # BLD AUTO: 4.56 M/UL
SODIUM SERPL-SCNC: 137 MMOL/L
TACROLIMUS BLD-MCNC: 6.5 NG/ML
WBC # BLD AUTO: 7.87 K/UL

## 2019-03-04 PROCEDURE — 85025 COMPLETE CBC W/AUTO DIFF WBC: CPT

## 2019-03-04 PROCEDURE — 80197 ASSAY OF TACROLIMUS: CPT

## 2019-03-04 PROCEDURE — 80053 COMPREHEN METABOLIC PANEL: CPT

## 2019-03-04 PROCEDURE — 36415 COLL VENOUS BLD VENIPUNCTURE: CPT | Mod: PO

## 2019-03-04 NOTE — TELEPHONE ENCOUNTER
Called patient regarding diabetic eye screening results spoke with his wife and schedule him an appointment.

## 2019-03-08 ENCOUNTER — TELEPHONE (OUTPATIENT)
Dept: TRANSPLANT | Facility: CLINIC | Age: 64
End: 2019-03-08

## 2019-03-08 DIAGNOSIS — Z94.4 LIVER TRANSPLANTED: Primary | ICD-10-CM

## 2019-03-08 DIAGNOSIS — K74.60 HEPATIC CIRRHOSIS, UNSPECIFIED HEPATIC CIRRHOSIS TYPE, UNSPECIFIED WHETHER ASCITES PRESENT: ICD-10-CM

## 2019-03-08 RX ORDER — NIFEDIPINE 60 MG/1
60 TABLET, EXTENDED RELEASE ORAL DAILY
Qty: 90 TABLET | Refills: 1 | Status: SHIPPED | OUTPATIENT
Start: 2019-03-08 | End: 2019-10-07 | Stop reason: SDUPTHER

## 2019-03-08 NOTE — LETTER
March 8, 2019    Vick Carlos  [address] [facility] [facility] [address]          Dear Vick Gonzalez:  MRN: [id number]    This is a follow up to your recent labs, your lab results were stable.  There are no medicine changes.  Please have your labs drawn again on 6/3/19.      If you cannot have your labs drawn on the scheduled date, it is your responsibility to call the transplant department to reschedule.  To reschedule or make an appointment, please as to speak to or leave a message for my assistant, Madison Pearce or Laura, at [phone].  When leaving a message for Madison Pearce Angela or myself, we ask that you leave a brief message regarding your request.    Sincerely,      Kandy Herrera, RN, BSN, [facility]  Liver Transplant Coordinator  [facility]  [address] [place] [id number]  [phone]

## 2019-03-11 DIAGNOSIS — M43.10 ACQUIRED SPONDYLOLISTHESIS: ICD-10-CM

## 2019-03-11 DIAGNOSIS — M54.16 LUMBAR RADICULOPATHY: ICD-10-CM

## 2019-03-11 DIAGNOSIS — G89.4 CHRONIC PAIN SYNDROME: ICD-10-CM

## 2019-03-11 DIAGNOSIS — M96.1 POSTLAMINECTOMY SYNDROME OF LUMBAR REGION: ICD-10-CM

## 2019-03-11 RX ORDER — OXYCODONE HYDROCHLORIDE 15 MG/1
15 TABLET ORAL EVERY 6 HOURS PRN
Qty: 120 TABLET | Refills: 0 | Status: SHIPPED | OUTPATIENT
Start: 2019-03-14 | End: 2019-04-08 | Stop reason: SDUPTHER

## 2019-03-11 NOTE — TELEPHONE ENCOUNTER
----- Message from Jay Gr sent at 3/11/2019  8:33 AM CDT -----  Contact: VIRGIL GR [[phone]]      Can the clinic reply in [facility]: no      Please refill the medication(s) listed below. Please call the patient when the prescription(s) is ready for  at this phone number   [phone]      Medication #1 oxyCODONE (ROXICODONE) 15 MG Tab    Preferred Pharmacy: [facility] - [address]

## 2019-03-15 RX ORDER — PEN NEEDLE, DIABETIC 30 GX3/16"
NEEDLE, DISPOSABLE MISCELLANEOUS
Qty: 100 EACH | Refills: 11 | OUTPATIENT
Start: 2019-03-15 | End: 2020-05-06

## 2019-03-31 RX ORDER — METOPROLOL SUCCINATE 200 MG/1
200 TABLET, EXTENDED RELEASE ORAL DAILY
Qty: 30 TABLET | Refills: 11 | Status: SHIPPED | OUTPATIENT
Start: 2019-03-31 | End: 2020-05-04

## 2019-04-08 ENCOUNTER — TELEPHONE (OUTPATIENT)
Dept: PAIN MEDICINE | Facility: CLINIC | Age: 64
End: 2019-04-08

## 2019-04-08 DIAGNOSIS — M43.10 ACQUIRED SPONDYLOLISTHESIS: ICD-10-CM

## 2019-04-08 DIAGNOSIS — M96.1 POSTLAMINECTOMY SYNDROME OF LUMBAR REGION: ICD-10-CM

## 2019-04-08 DIAGNOSIS — G89.4 CHRONIC PAIN SYNDROME: ICD-10-CM

## 2019-04-08 DIAGNOSIS — M54.16 LUMBAR RADICULOPATHY: ICD-10-CM

## 2019-04-08 NOTE — TELEPHONE ENCOUNTER
----- Message from Rain Yoder sent at 4/8/2019 12:02 PM CDT -----  Contact: pt  Can the clinic reply in [facility]:no    Please refill the medication(s) listed below. Please call the patient when the prescription(s) is ready for  at this phone number   [phone]       Medication #1oxyCODONE (ROXICODONE) 15 MG Tab    Preferred Pharmacy:[facility] - [address]

## 2019-04-09 RX ORDER — OXYCODONE HYDROCHLORIDE 15 MG/1
15 TABLET ORAL EVERY 6 HOURS PRN
Qty: 120 TABLET | Refills: 0 | Status: SHIPPED | OUTPATIENT
Start: 2019-04-12 | End: 2019-05-15 | Stop reason: SDUPTHER

## 2019-04-17 ENCOUNTER — OFFICE VISIT (OUTPATIENT)
Dept: UROLOGY | Facility: CLINIC | Age: 64
End: 2019-04-17
Payer: MEDICARE

## 2019-04-17 VITALS
BODY MASS INDEX: 36.5 KG/M2 | SYSTOLIC BLOOD PRESSURE: 151 MMHG | WEIGHT: 284.38 LBS | HEART RATE: 94 BPM | HEIGHT: 74 IN | DIASTOLIC BLOOD PRESSURE: 94 MMHG

## 2019-04-17 DIAGNOSIS — E11.59 TYPE 2 DIABETES MELLITUS WITH CIRCULATORY DISORDER CAUSING ERECTILE DYSFUNCTION: ICD-10-CM

## 2019-04-17 DIAGNOSIS — Z12.5 SCREENING FOR PROSTATE CANCER: ICD-10-CM

## 2019-04-17 DIAGNOSIS — N52.1 TYPE 2 DIABETES MELLITUS WITH CIRCULATORY DISORDER CAUSING ERECTILE DYSFUNCTION: ICD-10-CM

## 2019-04-17 DIAGNOSIS — N52.9 ERECTILE DYSFUNCTION, UNSPECIFIED ERECTILE DYSFUNCTION TYPE: Primary | ICD-10-CM

## 2019-04-17 PROCEDURE — 99214 PR OFFICE/OUTPT VISIT, EST, LEVL IV, 30-39 MIN: ICD-10-PCS | Mod: S$PBB,,, | Performed by: NURSE PRACTITIONER

## 2019-04-17 PROCEDURE — 99999 PR PBB SHADOW E&M-EST. PATIENT-LVL III: ICD-10-PCS | Mod: PBBFAC,,, | Performed by: NURSE PRACTITIONER

## 2019-04-17 PROCEDURE — 2024F PR 7 FIELD PHOTOS WITH INTERP/ REVIEW: ICD-10-PCS | Mod: ,,, | Performed by: NURSE PRACTITIONER

## 2019-04-17 PROCEDURE — 2024F 7 FLD RTA PHOTO EVC RTNOPTHY: CPT | Mod: ,,, | Performed by: NURSE PRACTITIONER

## 2019-04-17 PROCEDURE — 99999 PR PBB SHADOW E&M-EST. PATIENT-LVL III: CPT | Mod: PBBFAC,,, | Performed by: NURSE PRACTITIONER

## 2019-04-17 PROCEDURE — 99214 OFFICE O/P EST MOD 30 MIN: CPT | Mod: S$PBB,,, | Performed by: NURSE PRACTITIONER

## 2019-04-17 PROCEDURE — 99213 OFFICE O/P EST LOW 20 MIN: CPT | Mod: PBBFAC | Performed by: NURSE PRACTITIONER

## 2019-04-17 RX ORDER — SILDENAFIL CITRATE 20 MG/1
TABLET ORAL
Qty: 25 TABLET | Refills: 11 | Status: SHIPPED | OUTPATIENT
Start: 2019-04-17 | End: 2020-02-03

## 2019-04-17 NOTE — PROGRESS NOTES
CHIEF COMPLAINT:    Mr. Gonzalez is a 63 y.o. male presenting for erectile dysfunction.  PRESENTING ILLNESS:    Vick Gonzalez is a 63 y.o. male with a PMH of ED who presents for erectile dysfunction/med refill. His last clinic visit was 8/14/18 with DAVID Greer NP.    Hx of low testosterone and ED  Hx of hep c s/p liver transplant    Pt reports he needs refill for viagra. Reports ED > 1 year. He is able to have erections with viagra but has issues at times maintaining erection. For the most part it does help with ED per patient. He takes 100 mg. Denies side effects. He is not using HRT.   Denies LUTS. FOS good. Denies dysuria, hematuria or flank pain. Denies fever or chills.     Pt has HTN and diabetes (recent A1C 9.9 2/25/19)    REVIEW OF SYSTEMS:    Review of Systems    Constitutional: Negative for fever and chills.   HENT: Negative for hearing loss.   Eyes: Negative for visual disturbance.   Respiratory: Negative for shortness of breath.   Cardiovascular: Negative for chest pain.   Gastrointestinal: Negative for nausea, vomiting, and constipation.   Genitourinary: Positive erectile dysfunction.  Negative for urgency, frequency, difficulty urinating, nocturia, incontinence, dysuria, hematuria, intermittency, hesitancy, incomplete bladder emptying, and decreased urine volume.   Neurological: Negative for dizziness.   Hematological: Does not bruise/bleed easily.   Psychiatric/Behavioral: Negative for confusion.       PATIENT HISTORY:    Past Medical History:   Diagnosis Date    Anemia     Anxiety     Chronic pain syndrome 7/13/2011    CKD (chronic kidney disease), stage III     Diabetes mellitus type II, uncontrolled     Discitis of lumbosacral region 1/16/2015    ED (erectile dysfunction)     Genital herpes     Gout, arthritis     History of alcohol abuse     History of hepatitis C, s/p successful Rx w/ SVR24 (cure) - 5/2018 8/23/2011    Completed 24wks Epclusa + RBV w/SVR12 - 2/2018  -     History of  positive PPD, treatment status unknown     Pulmonary granulomas, negative sputum cultures for AFB and indeterminate quantferon test    History of substance abuse     Hypertension     Hypothyroidism     Liver replaced by transplant 8/23/2011    DATE: 12/16/2013  LIVER BIOPSY : REASON:  hep C staging  PATHOLOGY COMMITTEE NOTE/PLAN: :grade  1 / stage 1        Pancreatitis 2016    Peptic ulcer disease        Past Surgical History:   Procedure Laterality Date    BIOPSY, LIVER N/A 12/16/2013    Performed by [facility] OR 2ND FLR    BIOPSY, LIVER N/A 12/10/2012    Performed by [facility] OR 2ND FLR    CHOLECYSTECTOMY      COLONOSCOPY N/A 6/5/2015    Performed by Sundar Caraballo [facility] ENDO (4TH FLR)    ALCIDES-TRANSFORAMINAL Right 1/15/2015    Performed by [facility] PAIN MGT    LAMINECTOMY-LUMBAR-DECOMPRESSION L5- S1 exploration of TLIF N/A 6/16/2015    [facility]    LIVER TRANSPLANT  06/2010    MINIMALLY INVASIVE FUSION-TRANSLUMINAL LUMBAR INTERBODY (TLIF) Right 6/15/2015    [facility]    SPINE SURGERY         Family History   Problem Relation Age of Onset    Cancer Mother     Diabetes Mother     Heart disease Mother     Diabetes Sister     Cancer Maternal Uncle 82        colon CA    Melanoma Neg Hx     Psoriasis Neg Hx     Lupus Neg Hx     Eczema Neg Hx     Acne Neg Hx        Social History     Socioeconomic History    Marital status:      Spouse name: Not on file    Number of children: Not on file    Years of education: Not on file    Highest education level: Not on file   Occupational History    Not on file   Social Needs    Financial resource strain: Not on file    Food insecurity:     Worry: Not on file     Inability: Not on file    Transportation needs:     Medical: Not on file     Non-medical: Not on file   Tobacco Use    Smoking  "status: Former Smoker    Smokeless tobacco: Never Used   Substance and Sexual Activity    Alcohol use: No     Comment: over 5 years ago, none currently    Drug use: No    Sexual activity: Not on file   Lifestyle    Physical activity:     Days per week: Not on file     Minutes per session: Not on file    Stress: Not on file   Relationships    Social connections:     Talks on phone: Not on file     Gets together: Not on file     Attends L[employer] day service: Not on file     Active member of club or organization: Not on file     Attends meetings of clubs or organizations: Not on file     Relationship status: Not on file   Other Topics Concern    Not on file   Social History Narrative    Not on file       Allergies:  Patient has no known allergies.    Medications:    Current Outpatient Medications:     allopurinol (ZYLOPRIM) 100 MG tablet, TAKE 1 TABLET BY MOUTH TWICE A DAY, Disp: 60 tablet, Rfl: 7    amitriptyline (ELAVIL) 25 MG tablet, TAKE 1 TABLET (25 MG TOTAL) BY MOUTH EVERY EVENING. FOR NERVE PAIN AND SLEEP, Disp: 30 tablet, Rfl: 5    BD ULTRA-FINE MENDY PEN NEEDLES 32 gauge x 5/32" Ndle, , Disp: , Rfl:     blood sugar diagnostic Strp, 1 strip by Misc.(Non-Drug; Combo Route) route 3 (three) times daily., Disp: 100 strip, Rfl: 11    calcium carbonate-vitamin D3 (CALTRATE 600 + D) 600 mg(1,500mg) -400 unit Chew, , Disp: , Rfl:     colchicine 0.6 mg tablet, Take 1 tablet (0.6 mg total) by mouth 2 (two) times daily., Disp: 30 tablet, Rfl: 2    gabapentin (NEURONTIN) 800 MG tablet, Take 1 tablet (800 mg total) by mouth 3 (three) times daily., Disp: 90 tablet, Rfl: 11    insulin aspart U-100 (NOVOLOG FLEXPEN U-100 INSULIN) 100 unit/mL InPn pen, Inject 20 Units into the skin 3 (three) times daily with meals., Disp: 15 mL, Rfl: 11    insulin detemir U-100 (LEVEMIR FLEXTOUCH) 100 unit/mL (3 mL) SubQ InPn pen, Inject 70 Units into the skin every evening., Disp: 15 mL, Rfl: 11    lancets Misc, 1 each by " "Misc.(Non-Drug; Combo Route) route 4 (four) times daily before meals and nightly., Disp: 200 each, Rfl: 11    levothyroxine (SYNTHROID) 88 MCG tablet, TAKE 1 TABLET (88 MCG TOTAL) BY MOUTH ONCE DAILY., Disp: 90 tablet, Rfl: 4    lisinopril (PRINIVIL,ZESTRIL) 40 MG tablet, Take 1 tablet (40 mg total) by mouth once daily., Disp: 30 tablet, Rfl: 3    metoprolol succinate (TOPROL-XL) 200 MG 24 hr tablet, TAKE 1 TABLET (200 MG TOTAL) BY MOUTH ONCE DAILY., Disp: 30 tablet, Rfl: 11    multivitamin (THERAGRAN) per tablet, Take 1 tablet by mouth., Disp: , Rfl:     NIFEdipine (ADALAT CC) 60 MG TbSR, TAKE 1 TABLET (60 MG TOTAL) BY MOUTH ONCE DAILY., Disp: 90 tablet, Rfl: 1    NOVOFINE PLUS 32 gauge x 1/6" Ndle, , Disp: , Rfl:     oxyCODONE (ROXICODONE) 15 MG Tab, Take 1 tablet (15 mg total) by mouth every 6 (six) hours as needed for Pain., Disp: 120 tablet, Rfl: 0    pen needle, diabetic (BD ULTRA-FINE MENDY PEN NEEDLE) 32 gauge x 5/32" Ndle, TEST WITH NOVOLOG THREE TIMES A DAY WITH MEALS AND WITH LEVEMIR AT BEDTIME, Disp: 100 each, Rfl: 11    sertraline (ZOLOFT) 100 MG tablet, TAKE 1 TABLET (100 MG TOTAL) BY MOUTH ONCE DAILY., Disp: 30 tablet, Rfl: 7    sildenafil (REVATIO) 20 mg Tab, Take 5 by mouth 1 hour before sexual activity, Disp: 25 tablet, Rfl: 11    tacrolimus (PROGRAF) 1 MG Cap, TAKE 2 CAPSULES (2 MG TOTAL) BY MOUTH IN THE MORNING & TAKE 1 CAPSULE (1 MG TOTAL) IN THE EVENING, Disp: 90 capsule, Rfl: 11    TRUE METRIX GLUCOSE METER Misc, TEST 4 (FOUR) TIMES DAILY BEFORE MEALS AND NIGHTLY., Disp: 1 each, Rfl: 0    PHYSICAL EXAMINATION:    Constitutional: He is oriented to person, place, and time. He appears well-developed and well-nourished.  He is in no apparent distress.    Neck: Normal ROM.     Cardiovascular: Normal rate.      Pulmonary/Chest: Effort normal. No respiratory distress.     Abdominal:  He exhibits no distension.  There is no CVA tenderness.     Lymphadenopathy:        Right: No " supraclavicular adenopathy present.        Left: No supraclavicular adenopathy present.     Neurological: He is alert and oriented to person, place, and time.     Skin: Skin is warm and dry.     Extremities: Normal ROM    Psych: Cooperative with normal affect.        Physical Exam      LABS:      Lab Results   Component Value Date    PSA 0.30 09/06/2016    PSA 0.80 07/13/2012    PSA 0.53 05/12/2011    PSADIAG 0.28 10/09/2017       IMPRESSION:    Encounter Diagnoses   Name Primary?    Erectile dysfunction, unspecified erectile dysfunction type Yes    Type 2 diabetes mellitus with circulatory disorder causing erectile dysfunction          PLAN:  -We discussed ED and the contributing factors. We reviewed his personal factors that contribute to ED. Patient was educated on ED treatments. He would like to continue with sildenafil. Prescription given to patient.   Important to control HTN and diabetes to prevent worsening ED.  -PSA ordered  -Will continue to monitor for LUTS since patient asymptomatic  -RTC 3 mth      I spent 25 minutes with the patient of which more than half was spent in coordinating the patient's care as well as in direct consultation with the patient in regards to our treatment and plan.

## 2019-04-17 NOTE — PATIENT INSTRUCTIONS
Oral Medications for Erectile Dysfunction  Prescription oral medications can be used for ED. They often work well. But, like all medications, they can have side effects. Also, they cant be used if a man has certain health problems or takes certain other medications. Talk with your doctor about oral ED medication. Ask whether it is right for you.  Types of Oral ED Medications  There are three types of prescription oral ED medications. Each one increases blood flow to the penis. When the penis is stimulated, an erection results. The three types are:  · Sildenafil citrate (Viagra)  · Tadalafil (Cialis)  · Vardenafil HCl (Levitra)  What Oral ED Medications Dont Do  There are some things ED medications cant do.  · They dont cure the cause of your ED. Without the medication, youll still have trouble getting an erection.  · They cant produce an erection without sexual stimulation.  · They wont increase sexual desire. They also wont solve any other sexual issues. Psychological, emotional, or relationship issues will not be fixed.  Taking Oral ED Medications Safely  · Do not combine ED medications with other treatments unless your doctor tells you to.  · Dont take ED medications more often or in larger doses than prescribed.  · Tell your doctor your health history. Mention all medications you take. This includes over-the-counter drugs, supplements, and herbs.  · Ask your doctor about whether you can drink alcohol while taking ED medication.  Possible Side Effects of Oral ED Medications  · Headache  · Facial flushing  · Runny or stuffy nose  · Indigestion  · Distortion of your color vision for a short time  · Sudden vision loss or hearing loss (rare)  Risks of Oral ED Medications   · Do not take ED medications if you take medications containing nitrates. The combination may drop your blood pressure to a dangerous level. Nitrates include nitroglycerin (a drug for angina). They are also in poppers, an inhaled  recreational drug. If youre not sure whether youre taking nitrates, ask your doctor or pharmacist.  · Medications called alpha-blockers can interact with ED medications. They can cause a sudden drop in blood pressure. Alpha-blockers are a common treatment for prostate problems. They also treat high blood pressure. Be sure your doctor knows if you take these medications.  · If youve had a heart attack or have heart disease and you have not had sex for a while, talk to your doctor. Having sex again can put extra strain on your heart. Your doctor can confirm that your heart is healthy enough for sex.  · It is rare, but some men taking ED medications have had sudden vision loss. This may be more likely if other health problems are present. These include high blood pressure and diabetes. Ask your doctor if you are at risk for this type of vision loss.  · In rare cases, an erection may last too long. This can badly damage the blood vessels in your penis. If an erection lasts longer than 4 hours, go to the emergency room right away.       © 3238-9946 [employer], [address] [place] [address], [place] [address]. All rights reserved. This information is not intended as a substitute for professional medical care. Always follow your healthcare professional's instructions.

## 2019-05-15 ENCOUNTER — OFFICE VISIT (OUTPATIENT)
Dept: PAIN MEDICINE | Facility: CLINIC | Age: 64
End: 2019-05-15
Payer: MEDICARE

## 2019-05-15 VITALS
RESPIRATION RATE: 18 BRPM | SYSTOLIC BLOOD PRESSURE: 101 MMHG | BODY MASS INDEX: 35.55 KG/M2 | WEIGHT: 277 LBS | TEMPERATURE: 99 F | HEIGHT: 74 IN | HEART RATE: 88 BPM | DIASTOLIC BLOOD PRESSURE: 66 MMHG

## 2019-05-15 DIAGNOSIS — G95.9 LUMBAR MYELOPATHY: ICD-10-CM

## 2019-05-15 DIAGNOSIS — Z79.891 ENCOUNTER FOR MONITORING OPIOID MAINTENANCE THERAPY: ICD-10-CM

## 2019-05-15 DIAGNOSIS — M96.1 POSTLAMINECTOMY SYNDROME OF LUMBAR REGION: Primary | ICD-10-CM

## 2019-05-15 DIAGNOSIS — M54.16 LUMBAR RADICULOPATHY: ICD-10-CM

## 2019-05-15 DIAGNOSIS — M51.36 DDD (DEGENERATIVE DISC DISEASE), LUMBAR: ICD-10-CM

## 2019-05-15 DIAGNOSIS — M43.10 ACQUIRED SPONDYLOLISTHESIS: ICD-10-CM

## 2019-05-15 DIAGNOSIS — G89.4 CHRONIC PAIN SYNDROME: ICD-10-CM

## 2019-05-15 DIAGNOSIS — Z98.1 S/P LUMBAR SPINAL FUSION: ICD-10-CM

## 2019-05-15 DIAGNOSIS — Z51.81 ENCOUNTER FOR MONITORING OPIOID MAINTENANCE THERAPY: ICD-10-CM

## 2019-05-15 PROCEDURE — 99999 PR PBB SHADOW E&M-EST. PATIENT-LVL III: CPT | Mod: PBBFAC,,, | Performed by: NURSE PRACTITIONER

## 2019-05-15 PROCEDURE — 99214 PR OFFICE/OUTPT VISIT, EST, LEVL IV, 30-39 MIN: ICD-10-PCS | Mod: S$PBB,,, | Performed by: NURSE PRACTITIONER

## 2019-05-15 PROCEDURE — 99213 OFFICE O/P EST LOW 20 MIN: CPT | Mod: PBBFAC | Performed by: NURSE PRACTITIONER

## 2019-05-15 PROCEDURE — 99999 PR PBB SHADOW E&M-EST. PATIENT-LVL III: ICD-10-PCS | Mod: PBBFAC,,, | Performed by: NURSE PRACTITIONER

## 2019-05-15 PROCEDURE — 99214 OFFICE O/P EST MOD 30 MIN: CPT | Mod: S$PBB,,, | Performed by: NURSE PRACTITIONER

## 2019-05-15 RX ORDER — OXYCODONE HYDROCHLORIDE 15 MG/1
15 TABLET ORAL EVERY 6 HOURS PRN
Qty: 120 TABLET | Refills: 0 | Status: SHIPPED | OUTPATIENT
Start: 2019-07-12 | End: 2019-06-04 | Stop reason: SDUPTHER

## 2019-05-15 RX ORDER — OXYCODONE HYDROCHLORIDE 15 MG/1
15 TABLET ORAL EVERY 6 HOURS PRN
Qty: 120 TABLET | Refills: 0 | Status: SHIPPED | OUTPATIENT
Start: 2019-06-14 | End: 2019-06-04 | Stop reason: SDUPTHER

## 2019-05-15 RX ORDER — OXYCODONE HYDROCHLORIDE 15 MG/1
15 TABLET ORAL EVERY 6 HOURS PRN
Qty: 120 TABLET | Refills: 0 | Status: SHIPPED | OUTPATIENT
Start: 2019-05-15 | End: 2019-06-14

## 2019-05-15 NOTE — PROGRESS NOTES
Chronic patient Established Note (Follow up visit)      SUBJECTIVE:    Vick Gonzalez presents to the clinic for a follow-up appointment for lower back pain and medication refill. He continues to report low back pain that radiates down the posterior aspect of both legs to his feet. He describes this pain as shooting and tingling in nature. His pain is worse with prolonged walking and activity. He reports intermittent right sided abdominal pain along his transplant scar. He describes this pain as tender and cramping. He is actively trying to lose weight. He has cut out rice and white bread from his diet. He also avoiding sweets. He is trying to increase his physical activity. He continues to report benefit with current medication regimen. He takes Gabapentin with benefit. He continues to take Roxicodone 15 mg as needed with benefit. He denies any adverse effects. His pain today is 4/10.      Pain Disability Index Review:  Last 3 PDI Scores 5/15/2019 2/13/2019 11/13/2018   Pain Disability Index (PDI) 24 26 28       Pain Medications:    - Opioids: Roxicodone (Oxycodone/Acetaminophen) 15 mg QID PRN pain    Others: Gabapentin 2400 mg daily    Opioid Contract: yes     report:  Reviewed and consistent with medication use as prescribed.    Pain Procedures:   1/15/15 Right TFESI @ L5 & S1     Physical Therapy/Home Exercise: yes, per self    Imaging:   Lumbar MRI 6/16/15  Narrative   Technique: Routine lumbar spine MRI performed without contrast.    Comparison: MRI 06/16/2015, CT 06/15/2015.    Findings:    There are postsurgical changes consistent with L5-S1 posterior instrumented fusion with bilateral pedicular screws, vertical stabilizing rods and an intervertebral disk spacer.  When compared to the MRI dated 06/16/2015 00:33, there are new postsurgical   changes consistent with left L5 hemilaminectomy.  There is a 2.5 cm ill-defined fluid collection at the site of hemilaminectomy that is not well evaluated due to the  lack of IV contrast but appears to extend anteriorly into the spinal canal and into the   left foraminal zone.  There is as possible small fluid collection within the anterior right epidural space that may also due to compression of the adjacent spinal canal.  There is stable grade I anterolistheses of L5 on S1 with persistent high signal   intensity adjacent to the left lateral aspect of the disk spacer that demonstrates moderate associated bone marrow edema of the adjacent vertebral endplates, findings that are when compared to the previous exam.  Note is made that there is an apparent   high signal intensity within the nerve roots posterior to the L5-S1 level that was not definitely present on the previous exam.    There is mild persistent height loss loss involving the L5 vertebral body, likely degenerative in nature.  Remaining vertebral body heights are well-maintained without findings to suggest acute fracture.    There is mild intervertebral disk spacing and desiccation at L4-L5 with a small circumferential disk bulge. No disk protrusion or extrusion. There is moderate bilateral facet arthropathy and mild bilateral uncomfortable and buckling at this level.  These   findings contribute to mild spinal canal stenosis and mild bilateral foraminal narrowing.    Noting aforementioned postsurgical changes, there is persistent severe bilateral foraminal narrowing that is difficult to accurately characterize due to adjacent artifact.    There is edema anterior to the L4, L5 and S1 vertebral bodies, presumably postsurgical in nature.  No drainable fluid collections identified. Visualized retroperitoneal organs are unremarkable.   Impression       Postsurgical changes of L5-S1 posterior spinal fusion and left L5 hemilaminectomy. There is an ill-defined fluid collection at the site of hemilaminectomy that is not well evaluated due to the lack of IV contrast but appears to extend anteriorly with   suspected resulting  mass effect on the spinal canal and the left foraminal zone.  There is a suspected small fluid collection within the anterior right epidural space that may contribute to additional mass effect on the adjacent spinal canal. While   findings may represent sequela of expected postsurgical changes, continued follow-up is advised to ensure improvement and/or resolution.    Persistent abnormal high signal intensity adjacent to the left lateral aspect of the intervertebral disk spacer with moderate associated bone marrow edema of the adjacent vertebral endplates. Findings are similar when compared to the previous exam could   represent postsurgical changes. Early infection could have a similar appearance and is possible. Continued follow-up is advised.    Apparent high signal intensity within the nerve roots posterior to the L5-S1 level that was not definitely present on the previous exam. Findings may be artifactual in nature however, sequela of nerve root inflammation/edema is possible noting recent   surgery.    This report has been flagged in the Epic medical record     .  CMP  Sodium   Date Value Ref Range Status   03/04/2019 137 136 - 145 mmol/L Final     Potassium   Date Value Ref Range Status   03/04/2019 4.5 3.5 - 5.1 mmol/L Final     Chloride   Date Value Ref Range Status   03/04/2019 104 95 - 110 mmol/L Final     CO2   Date Value Ref Range Status   03/04/2019 23 23 - 29 mmol/L Final     Glucose   Date Value Ref Range Status   03/04/2019 265 (H) 70 - 110 mg/dL Final     BUN, Bld   Date Value Ref Range Status   03/04/2019 19 8 - 23 mg/dL Final     Creatinine   Date Value Ref Range Status   03/04/2019 1.4 0.5 - 1.4 mg/dL Final     Calcium   Date Value Ref Range Status   03/04/2019 9.4 8.7 - 10.5 mg/dL Final     Total Protein   Date Value Ref Range Status   03/04/2019 7.5 6.0 - 8.4 g/dL Final     Albumin   Date Value Ref Range Status   03/04/2019 3.7 3.5 - 5.2 g/dL Final   07/25/2018 4.5 3.6 - 5.1 g/dL Final      "Comment:     @ Test Performed By:  [facility]  Marquis Wright M.D., Ph.D.,   [address] [place] [address]  [facility]  [id number]       Total Bilirubin   Date Value Ref Range Status   03/04/2019 0.5 0.1 - 1.0 mg/dL Final     Comment:     For infants and newborns, interpretation of results should be based  on gestational age, weight and in agreement with clinical  observations.  Premature Infant recommended reference ranges:  Up to 24 hours.............<8.0 mg/dL  Up to 48 hours............<12.0 mg/dL  3-5 days..................<15.0 mg/dL  6-29 days.................<15.0 mg/dL       Alkaline Phosphatase   Date Value Ref Range Status   03/04/2019 97 55 - 135 U/L Final     AST   Date Value Ref Range Status   03/04/2019 17 10 - 40 U/L Final     ALT   Date Value Ref Range Status   03/04/2019 23 10 - 44 U/L Final     Anion Gap   Date Value Ref Range Status   03/04/2019 10 8 - 16 mmol/L Final     eGFR if    Date Value Ref Range Status   03/04/2019 >60.0 >60 mL/min/1.73 m^2 Final     eGFR if non    Date Value Ref Range Status   03/04/2019 53.8 (A) >60 mL/min/1.73 m^2 Final     Comment:     Calculation used to obtain the estimated glomerular filtration  rate (eGFR) is the CKD-EPI equation.            Allergies:   Review of patient's allergies indicates:   Allergen Reactions    Naproxen      Other reaction(s): problems w/liver/kid    Oxaprozin      Other reaction(s): cause problems w/kid/liver       Current Medications:   Current Outpatient Medications   Medication Sig Dispense Refill    allopurinol (ZYLOPRIM) 100 MG tablet TAKE 1 TABLET BY MOUTH TWICE A DAY 60 tablet 7    amitriptyline (ELAVIL) 25 MG tablet TAKE 1 TABLET (25 MG TOTAL) BY MOUTH EVERY EVENING. FOR NERVE PAIN AND SLEEP 30 tablet 5    BD ULTRA-FINE MENDY PEN NEEDLES 32 gauge x 5/32" Ndle       blood sugar diagnostic Strp 1 strip by Misc.(Non-Drug; Combo Route) route 3 " "(three) times daily. 100 strip 11    calcium carbonate-vitamin D3 (CALTRATE 600 + D) 600 mg(1,500mg) -400 unit Chew       colchicine 0.6 mg tablet Take 1 tablet (0.6 mg total) by mouth 2 (two) times daily. 30 tablet 2    gabapentin (NEURONTIN) 800 MG tablet Take 1 tablet (800 mg total) by mouth 3 (three) times daily. 90 tablet 11    insulin aspart U-100 (NOVOLOG FLEXPEN U-100 INSULIN) 100 unit/mL InPn pen Inject 20 Units into the skin 3 (three) times daily with meals. 15 mL 11    insulin detemir U-100 (LEVEMIR FLEXTOUCH) 100 unit/mL (3 mL) SubQ InPn pen Inject 70 Units into the skin every evening. 15 mL 11    lancets Misc 1 each by Misc.(Non-Drug; Combo Route) route 4 (four) times daily before meals and nightly. 200 each 11    levothyroxine (SYNTHROID) 88 MCG tablet TAKE 1 TABLET (88 MCG TOTAL) BY MOUTH ONCE DAILY. 90 tablet 4    lisinopril (PRINIVIL,ZESTRIL) 40 MG tablet Take 1 tablet (40 mg total) by mouth once daily. 30 tablet 3    metoprolol succinate (TOPROL-XL) 200 MG 24 hr tablet TAKE 1 TABLET (200 MG TOTAL) BY MOUTH ONCE DAILY. 30 tablet 11    multivitamin (THERAGRAN) per tablet Take 1 tablet by mouth.      NIFEdipine (ADALAT CC) 60 MG TbSR TAKE 1 TABLET (60 MG TOTAL) BY MOUTH ONCE DAILY. 90 tablet 1    NOVOFINE PLUS 32 gauge x 1/6" Ndle       oxyCODONE (ROXICODONE) 15 MG Tab Take 1 tablet (15 mg total) by mouth every 6 (six) hours as needed for Pain. 120 tablet 0    pen needle, diabetic (BD ULTRA-FINE MENDY PEN NEEDLE) 32 gauge x 5/32" Ndle TEST WITH NOVOLOG THREE TIMES A DAY WITH MEALS AND WITH LEVEMIR AT BEDTIME 100 each 11    sertraline (ZOLOFT) 100 MG tablet TAKE 1 TABLET (100 MG TOTAL) BY MOUTH ONCE DAILY. 30 tablet 7    sildenafil (REVATIO) 20 mg Tab Take 5 by mouth 1 hour before sexual activity 25 tablet 11    tacrolimus (PROGRAF) 1 MG Cap TAKE 2 CAPSULES (2 MG TOTAL) BY MOUTH IN THE MORNING & TAKE 1 CAPSULE (1 MG TOTAL) IN THE EVENING 90 capsule 11    TRUE METRIX GLUCOSE METER Misc " TEST 4 (FOUR) TIMES DAILY BEFORE MEALS AND NIGHTLY. 1 each 0     No current facility-administered medications for this visit.        REVIEW OF SYSTEMS:    GENERAL:  No weight loss, malaise or fevers.  HEENT:  Negative for frequent or significant headaches.  NECK:  Negative for lumps, goiter, pain and significant neck swelling.  RESPIRATORY:  Negative for cough, wheezing or shortness of breath.  CARDIOVASCULAR:  Negative for chest pain, leg swelling or palpitations. H/O hypertension.  GI:  Negative for abdominal discomfort, blood in stools or black stools or change in bowel habits.  MUSCULOSKELETAL:  See HPI.  SKIN:  Negative for lesions, rash, and itching.  PSYCH:  Negative for sleep disturbance, mood disorder and recent psychosocial stressors.  HEMATOLOGY/LYMPHOLOGY:  Negative for prolonged bleeding, bruising easily or swollen nodes. H/O liver transplant.  NEURO:   No history of headaches, syncope, paralysis, seizures or tremors.  ENDO: Diabetes.   All other reviewed and negative other than HPI.    Past Medical History:  Past Medical History:   Diagnosis Date    Anemia     Anxiety     Chronic pain syndrome 7/13/2011    CKD (chronic kidney disease), stage III     Diabetes mellitus type II, uncontrolled     Discitis of lumbosacral region 1/16/2015    ED (erectile dysfunction)     Genital herpes     Gout, arthritis     History of alcohol abuse     History of hepatitis C, s/p successful Rx w/ SVR24 (cure) - 5/2018 8/23/2011    Completed 24wks Epclusa + RBV w/SVR12 - 2/2018  -     History of positive PPD, treatment status unknown     Pulmonary granulomas, negative sputum cultures for AFB and indeterminate quantferon test    History of substance abuse     Hypertension     Hypothyroidism     Liver replaced by transplant 8/23/2011    DATE: 12/16/2013  LIVER BIOPSY : REASON:  hep C staging  PATHOLOGY COMMITTEE NOTE/PLAN: :grade  1 / stage 1        Pancreatitis 2016    Peptic ulcer disease        Past  Surgical History:  Past Surgical History:   Procedure Laterality Date    BIOPSY, LIVER N/A 12/16/2013    Performed by [facility] at [facility] OR 2ND FLR    BIOPSY, LIVER N/A 12/10/2012    Performed by [facility] at [facility] OR 2ND FLR    CHOLECYSTECTOMY      COLONOSCOPY N/A 6/5/2015    Performed by Sundar Caraballo MD at [facility] ENDO (4TH FLR)    ALCIDES-TRANSFORAMINAL Right 1/15/2015    Per[facility] at [facility] PAIN MGT    LAMINECTOMY-LUMBAR-DECOMPRESSION L5- S1 exploration of TLIF N/A 6/16/2015    Performed by Migel Crabtree [facility] at [facility] OR 2ND FLR    LIVER TRANSPLANT  06/2010    MINIMALLY INVASIVE FUSION-TRANSLUMINAL LUMBAR INTERBODY (TLIF) Right 6/15/2015    Performed by Migel [facility]tree [facility] at [facility] OR 2ND FLR    SPINE SURGERY         Family History:  Family History   Problem Relation Age of Onset    Cancer Mother     Diabetes Mother     Heart disease Mother     Diabetes Sister     Cancer Maternal Uncle 82        colon CA    Melanoma Neg Hx     Psoriasis Neg Hx     Lupus Neg Hx     Eczema Neg Hx     Acne Neg Hx        Social History:  Social History     Socioeconomic History    Marital status:      Spouse name: Not on file    Number of children: Not on file    Years of education: Not on file    Highest education level: Not on file   Occupational History    Not on file   Social Needs    Financial resource strain: Not on file    Food insecurity:     Worry: Not on file     Inability: Not on file    Transportation needs:     Medical: Not on file     Non-medical: Not on file   Tobacco Use    Smoking status: Former Smoker    Smokeless tobacco: Never Used   Substance and Sexual Activity    Alcohol use: No     Comment: over 5 years ago, none currently    Drug use: No    Sexual activity: Not on file   Lifestyle    Physical activity:     Days per week: Not on file     Minutes per session: Not on file    Stress: Not on file   Relationships    Social  "connections:     Talks on phone: Not on file     Gets together: Not on file     Attends Zoroastrianism service: Not on file     Active member of club or organization: Not on file     Attends meetings of clubs or organizations: Not on file     Relationship status: Not on file   Other Topics Concern    Not on file   Social History Narrative    Not on file       OBJECTIVE:    /66   Pulse 88   Temp 98.5 °F (36.9 °C)   Resp 18   Ht 6' 2" (1.88 m)   Wt 125.6 kg (277 lb)   BMI 35.56 kg/m²     PHYSICAL EXAMINATION:    General appearance: Well appearing, in no acute distress, alert and oriented x3.  Psych:  Mood and affect appropriate.  Skin: Skin color, texture, turgor normal, no rashes or lesions, in both upper and lower body.  Head/face:  Atraumatic, normocephalic. No palpable lymph nodes.  GI: Abdomen soft. TTP over right upper quadrant along scar.   Back: Straight leg raising in the sitting position is negative to radicular pain. There is mild pain with palpation over lumbar spine. Limited ROM with pain on extension. Positive facet loading bilaterally.   Extremities: Peripheral joint ROM is full and pain free without obvious instability or laxity in all four extremities. There is mild pain with palpation over bilateral SI joints. FABERs is negative bilaterally. No deformities or skin discoloration. Good capillary refill.   Musculoskeletal:  Right plantar flexion 4/5.  All other LE strength 5/5 and symmetric.  No atrophy or tone abnormalities are noted.  Neuro: Bilateral upper and lower extremity coordination and muscle stretch reflexes are physiologic and symmetric.  Plantar response are downgoing.  Decreased sensation to anterior aspect of right foot.   Gait: Antalgic- ambulates with cane.    ASSESSMENT: 63 y.o. year old male with lower back and right leg pain, consistent with the following diagnoses:     1. Postlaminectomy syndrome of lumbar region  oxyCODONE (ROXICODONE) 15 MG Tab    oxyCODONE (ROXICODONE) " 15 MG Tab    oxyCODONE (ROXICODONE) 15 MG Tab   2. S/P lumbar spinal fusion     3. Chronic pain syndrome  oxyCODONE (ROXICODONE) 15 MG Tab    oxyCODONE (ROXICODONE) 15 MG Tab    oxyCODONE (ROXICODONE) 15 MG Tab   4. Lumbar radiculopathy  oxyCODONE (ROXICODONE) 15 MG Tab    oxyCODONE (ROXICODONE) 15 MG Tab    oxyCODONE (ROXICODONE) 15 MG Tab   5. Acquired spondylolisthesis  oxyCODONE (ROXICODONE) 15 MG Tab    oxyCODONE (ROXICODONE) 15 MG Tab    oxyCODONE (ROXICODONE) 15 MG Tab   6. DDD (degenerative disc disease), lumbar     7. Lumbar myelopathy     8. Encounter for monitoring opioid maintenance therapy           PLAN:     - Previous imaging was reviewed and discussed with the patient today. Recent labs reviewed with patient today.     - Patient can continue Roxicodone 15 mg QID PRN pain, #120. 3 months of prescriptions provided today.     - The patient is here today for a refill of current pain medications and they believe these provide effective pain control and improvements in quality of life.  The patient notes no serious side effects, and feels the benefits outweigh the risks.  The patient was reminded of the pain contract that they signed previously as well as the risks and benefits of the medication including possible death.  The updated [place] Board of Pharmacy prescription monitoring program was reviewed, and the patient has been found to be compliant with current treatment plan. Medication management provided by Dr. Penn.     - Continue Gabapentin 800 mg TID.     - UDS from 8/22/2018 reviewed and consistent. UDS done today.      - The patient will continue a home exercise routine to help with pain and strengthening.      - RTC in 3 months or sooner if needed.    - Counseled patient regarding the importance of constant sleeping habits and physical therapy.     - Dr. Penn was evaluated the patient and agrees with this plan.    The above plan and management options were discussed at length with patient.  Patient is in agreement with the above and verbalized understanding.    Emma Batista NP  05/15/2019

## 2019-05-21 ENCOUNTER — PATIENT OUTREACH (OUTPATIENT)
Dept: ADMINISTRATIVE | Facility: HOSPITAL | Age: 64
End: 2019-05-21

## 2019-05-21 DIAGNOSIS — Z13.5 ENCOUNTER FOR SCREENING FOR DIABETIC RETINOPATHY: Primary | ICD-10-CM

## 2019-05-21 DIAGNOSIS — E11.65 UNCONTROLLED TYPE 2 DIABETES MELLITUS WITH HYPERGLYCEMIA: ICD-10-CM

## 2019-06-03 ENCOUNTER — LAB VISIT (OUTPATIENT)
Dept: LAB | Facility: HOSPITAL | Age: 64
End: 2019-06-03
Attending: INTERNAL MEDICINE
Payer: MEDICARE

## 2019-06-03 DIAGNOSIS — K74.60 HEPATIC CIRRHOSIS, UNSPECIFIED HEPATIC CIRRHOSIS TYPE, UNSPECIFIED WHETHER ASCITES PRESENT: ICD-10-CM

## 2019-06-03 DIAGNOSIS — Z94.4 LIVER TRANSPLANTED: ICD-10-CM

## 2019-06-03 DIAGNOSIS — E11.65 UNCONTROLLED TYPE 2 DIABETES MELLITUS WITH HYPERGLYCEMIA: ICD-10-CM

## 2019-06-03 LAB
AFP SERPL-MCNC: 4.2 NG/ML (ref 0–8.4)
ALBUMIN SERPL BCP-MCNC: 3.7 G/DL (ref 3.5–5.2)
ALP SERPL-CCNC: 76 U/L (ref 55–135)
ALT SERPL W/O P-5'-P-CCNC: 26 U/L (ref 10–44)
ANION GAP SERPL CALC-SCNC: 10 MMOL/L (ref 8–16)
AST SERPL-CCNC: 21 U/L (ref 10–40)
BASOPHILS # BLD AUTO: 0.04 K/UL (ref 0–0.2)
BASOPHILS NFR BLD: 0.4 % (ref 0–1.9)
BILIRUB SERPL-MCNC: 0.5 MG/DL (ref 0.1–1)
BUN SERPL-MCNC: 30 MG/DL (ref 8–23)
CALCIUM SERPL-MCNC: 9.1 MG/DL (ref 8.7–10.5)
CHLORIDE SERPL-SCNC: 106 MMOL/L (ref 95–110)
CO2 SERPL-SCNC: 23 MMOL/L (ref 23–29)
CREAT SERPL-MCNC: 1.9 MG/DL (ref 0.5–1.4)
DIFFERENTIAL METHOD: ABNORMAL
EOSINOPHIL # BLD AUTO: 0.6 K/UL (ref 0–0.5)
EOSINOPHIL NFR BLD: 6.5 % (ref 0–8)
ERYTHROCYTE [DISTWIDTH] IN BLOOD BY AUTOMATED COUNT: 14 % (ref 11.5–14.5)
EST. GFR  (AFRICAN AMERICAN): 43 ML/MIN/1.73 M^2
EST. GFR  (NON AFRICAN AMERICAN): 37.2 ML/MIN/1.73 M^2
ESTIMATED AVG GLUCOSE: 217 MG/DL (ref 68–131)
GLUCOSE SERPL-MCNC: 128 MG/DL (ref 70–110)
HBA1C MFR BLD HPLC: 9.2 % (ref 4–5.6)
HCT VFR BLD AUTO: 39.5 % (ref 40–54)
HGB BLD-MCNC: 12.7 G/DL (ref 14–18)
IMM GRANULOCYTES # BLD AUTO: 0.02 K/UL (ref 0–0.04)
IMM GRANULOCYTES NFR BLD AUTO: 0.2 % (ref 0–0.5)
LYMPHOCYTES # BLD AUTO: 3.9 K/UL (ref 1–4.8)
LYMPHOCYTES NFR BLD: 38.8 % (ref 18–48)
MCH RBC QN AUTO: 29.2 PG (ref 27–31)
MCHC RBC AUTO-ENTMCNC: 32.2 G/DL (ref 32–36)
MCV RBC AUTO: 91 FL (ref 82–98)
MONOCYTES # BLD AUTO: 0.7 K/UL (ref 0.3–1)
MONOCYTES NFR BLD: 6.8 % (ref 4–15)
NEUTROPHILS # BLD AUTO: 4.7 K/UL (ref 1.8–7.7)
NEUTROPHILS NFR BLD: 47.3 % (ref 38–73)
NRBC BLD-RTO: 0 /100 WBC
PLATELET # BLD AUTO: 157 K/UL (ref 150–350)
PMV BLD AUTO: 12.7 FL (ref 9.2–12.9)
POTASSIUM SERPL-SCNC: 3.7 MMOL/L (ref 3.5–5.1)
PROT SERPL-MCNC: 7.3 G/DL (ref 6–8.4)
RBC # BLD AUTO: 4.35 M/UL (ref 4.6–6.2)
SODIUM SERPL-SCNC: 139 MMOL/L (ref 136–145)
WBC # BLD AUTO: 9.91 K/UL (ref 3.9–12.7)

## 2019-06-03 PROCEDURE — 80053 COMPREHEN METABOLIC PANEL: CPT

## 2019-06-03 PROCEDURE — 80197 ASSAY OF TACROLIMUS: CPT

## 2019-06-03 PROCEDURE — 36415 COLL VENOUS BLD VENIPUNCTURE: CPT | Mod: PO

## 2019-06-03 PROCEDURE — 85025 COMPLETE CBC W/AUTO DIFF WBC: CPT

## 2019-06-03 PROCEDURE — 82105 ALPHA-FETOPROTEIN SERUM: CPT

## 2019-06-03 PROCEDURE — 83036 HEMOGLOBIN GLYCOSYLATED A1C: CPT

## 2019-06-04 ENCOUNTER — TELEPHONE (OUTPATIENT)
Dept: TRANSPLANT | Facility: CLINIC | Age: 64
End: 2019-06-04

## 2019-06-04 ENCOUNTER — OFFICE VISIT (OUTPATIENT)
Dept: FAMILY MEDICINE | Facility: CLINIC | Age: 64
End: 2019-06-04
Payer: MEDICARE

## 2019-06-04 VITALS
DIASTOLIC BLOOD PRESSURE: 68 MMHG | SYSTOLIC BLOOD PRESSURE: 120 MMHG | TEMPERATURE: 98 F | HEIGHT: 73 IN | WEIGHT: 280 LBS | BODY MASS INDEX: 37.11 KG/M2

## 2019-06-04 DIAGNOSIS — I10 HYPERTENSION, UNSPECIFIED TYPE: ICD-10-CM

## 2019-06-04 DIAGNOSIS — E11.65 UNCONTROLLED TYPE 2 DIABETES MELLITUS WITH HYPERGLYCEMIA: Primary | ICD-10-CM

## 2019-06-04 DIAGNOSIS — N18.30 CKD (CHRONIC KIDNEY DISEASE), STAGE III: ICD-10-CM

## 2019-06-04 LAB — TACROLIMUS BLD-MCNC: 23.9 NG/ML (ref 5–15)

## 2019-06-04 PROCEDURE — 2024F 7 FLD RTA PHOTO EVC RTNOPTHY: CPT | Mod: ,,, | Performed by: FAMILY MEDICINE

## 2019-06-04 PROCEDURE — 99214 OFFICE O/P EST MOD 30 MIN: CPT | Mod: PBBFAC,PO | Performed by: FAMILY MEDICINE

## 2019-06-04 PROCEDURE — 99999 PR PBB SHADOW E&M-EST. PATIENT-LVL IV: ICD-10-PCS | Mod: PBBFAC,,, | Performed by: FAMILY MEDICINE

## 2019-06-04 PROCEDURE — 99214 PR OFFICE/OUTPT VISIT, EST, LEVL IV, 30-39 MIN: ICD-10-PCS | Mod: S$PBB,,, | Performed by: FAMILY MEDICINE

## 2019-06-04 PROCEDURE — 99214 OFFICE O/P EST MOD 30 MIN: CPT | Mod: S$PBB,,, | Performed by: FAMILY MEDICINE

## 2019-06-04 PROCEDURE — 2024F PR 7 FIELD PHOTOS WITH INTERP/ REVIEW: ICD-10-PCS | Mod: ,,, | Performed by: FAMILY MEDICINE

## 2019-06-04 PROCEDURE — 99999 PR PBB SHADOW E&M-EST. PATIENT-LVL IV: CPT | Mod: PBBFAC,,, | Performed by: FAMILY MEDICINE

## 2019-06-04 NOTE — PROGRESS NOTES
(Portions of this note were dictated using voice recognition software and may contain dictation related errors in spelling/grammar/syntax not found on text review)    CC:   Chief Complaint   Patient presents with    Medication Dose Change       HPI: 63 y.o. male   Last visit 02/22/2019    Diabetes:  Uncontrolled, significant hyperglycemia.  Last visit Levemir was increased to 70 units daily with NovoLog 20 t.i.d...   He has complicating chronic kidney disease stage 3 and multiple other medical issues listed below.  Patient called earlier this month concerned about significant worsening of hyperglycemia.  He was on metformin at 1 time in 2016 but had been admitted  with hyperglycemic hyperosmolar state in 2017, medication therapy converted over to basal/prandial insulin      Ft exam up-to-date 10/29/2018  Eye exam:  Retinal camera done February 2019.  Inadequate imaging of right eye, requires eye exam  Does get neuropathy symptoms that we discussed likely is on count of his diabetes    Had heard about G LP 1 agonist therapy and affect on possible weight loss.  He is concerned that he is gaining weight with the insulin was wondering if it tried the above therapy.  We did discuss in depth today that he has a history of pancreatitis in the past, last admitted in 2016.  Currently no symptoms of this.  We discussed potential complication of pancreatitis with G LP 1 agonist therapy and that while this is not an absolute contraindication, this is a significant caution when using the medication.  He is aware the risks but he is willing to try addition of G LP 1 agonist therapy anyway.  Currently denies any fevers, chills, abdominal pain, nausea, or vomiting     HTN:   lisinopril 40 mg, metoprolol succinate 200 mg daily nifedipine 60.     Chronic liver disease with cirrhosis status post liver transplant.  Follows with hepatology.  On Prograf.Reviewed recent liver ultrasound from February 2019 showing hepatic  steatosis     Hyperlipidemia, was on Zetia in the past but not currently.    Cannot take statins secondary to his liver disease     L DDD and chronic pain syndrome followed by pain management, on oxycodone, gabapentin, amitriptyline.     Zoloft 100 mg for anxiety     PAD  bilateral leg interventional treatment with balloon angioplasty       Past Medical History:   Diagnosis Date    Anemia     Anxiety     Chronic pain syndrome 7/13/2011    CKD (chronic kidney disease), stage III     Diabetes mellitus type II, uncontrolled     Discitis of lumbosacral region 1/16/2015    ED (erectile dysfunction)     Genital herpes     Gout, arthritis     History of alcohol abuse     History of hepatitis C, s/p successful Rx w/ SVR24 (cure) - 5/2018 8/23/2011    Completed 24wks Epclusa + RBV w/SVR12 - 2/2018  -     History of positive PPD, treatment status unknown     Pulmonary granulomas, negative sputum cultures for AFB and indeterminate quantferon test    History of substance abuse     Hypertension     Hypothyroidism     Liver replaced by transplant 8/23/2011    DATE: 12/16/2013  LIVER BIOPSY : REASON:  hep C staging  PATHOLOGY COMMITTEE NOTE/PLAN: :grade  1 / stage 1        Pancreatitis 2016    Peptic ulcer disease        Past Surgical History:   Procedure Laterality Date    BIOPSY, LIVER N/A 12/16/2013    Performed [facility] at [facility] OR 2ND FLR    BIOPSY, LIVER N/A 12/10/2012    Performed by [facility] at [facility] OR 2ND FLR    CHOLECYSTECTOMY      COLONOSCOPY N/A 6/5/2015    Performed by Sundar Caraballo MD at [facility] ENDO (4TH FLR)    ALCIDES-TRANSFORAMINAL Right 1/15/2015    Per[facility] by [facility] at [facility] PAIN MGT    LAMINECTOMY-LUMBAR-DECOMPRESSION L5- S1 exploration of TLIF N/A 6/16/2015    Performed by Migel Crabtree MD at [facility] OR 2ND FLR    LIVER TRANSPLANT  06/2010    MINIMALLY INVASIVE FUSION-TRANSLUMINAL LUMBAR INTERBODY (TLIF) Right 6/15/2015    [facility]    SPINE SURGERY         Family History   Problem Relation Age of Onset    Cancer Mother     Diabetes Mother     Heart disease Mother     Diabetes Sister     Cancer Maternal Uncle 82        colon CA    Melanoma Neg Hx     Psoriasis Neg Hx     Lupus Neg Hx     Eczema Neg Hx     Acne Neg Hx        Social History     Socioeconomic History    Marital status:      Spouse name: Not on file    Number of children: Not on file    Years of education: Not on file    Highest education level: Not on file   Occupational History    Not on file   Social Needs    Financial resource strain: Not on file    Food insecurity:     Worry: Not on file     Inability: Not on file    Transportation needs:     Medical: Not on file     Non-medical: Not on file   Tobacco Use    Smoking status: Former Smoker    Smokeless tobacco: Never Used   Substance and Sexual Activity    Alcohol use: No     Comment: over 5 years ago, none currently    Drug use: No    Sexual activity: Not on file   Lifestyle    Physical activity:     Days per week: Not on file     Minutes per session: Not on file    Stress: Not on file   Relationships    Social connections:     Talks on phone: Not on file     Gets together: Not on file     Attends [employer] service: Not on file     Active member of club or organization: Not on file     Attends meetings of clubs or organizations: Not on file     Relationship status: Not on file   Other Topics Concern    Not on file   Social History Narrative    Not on file     Lab Results   Component Value Date    WBC 9.91 06/03/2019    HGB 12.7 (L) 06/03/2019    HCT 39.5 (L) 06/03/2019    MCV 91 06/03/2019     06/03/2019    CHOL 170 02/25/2019    TRIG 284 (H) 02/25/2019    HDL 25 (L) 02/25/2019    ALT 26 06/03/2019    AST 21 06/03/2019    BILITOT 0.5 06/03/2019    ALKPHOS 76 06/03/2019     06/03/2019    K 3.7 06/03/2019     06/03/2019    CREATININE 1.9 (H)  06/03/2019    CALCIUM 9.1 06/03/2019    ALBUMIN 3.7 06/03/2019    BUN 30 (H) 06/03/2019    CO2 23 06/03/2019    TSH 1.924 07/25/2018    PSA 0.30 09/06/2016    INR 1.0 11/01/2017    HGBA1C 9.2 (H) 06/03/2019    LDLCALC 88.2 02/25/2019     (H) 06/03/2019         Hemoglobin A1C   Date Value Ref Range Status   06/03/2019 9.2 (H) 4.0 - 5.6 % Final     Comment:     ADA Screening Guidelines:  5.7-6.4%  Consistent with prediabetes  >or=6.5%  Consistent with diabetes  High levels of fetal hemoglobin interfere with the HbA1C  assay. Heterozygous hemoglobin variants (HbS, HgC, etc)do  not significantly interfere with this assay.   However, presence of multiple variants may affect accuracy.     02/25/2019 9.9 (H) 4.0 - 5.6 % Final     Comment:     ADA Screening Guidelines:  5.7-6.4%  Consistent with prediabetes  >or=6.5%  Consistent with diabetes  High levels of fetal hemoglobin interfere with the HbA1C  assay. Heterozygous hemoglobin variants (HbS, HgC, etc)do  not significantly interfere with this assay.   However, presence of multiple variants may affect accuracy.     03/28/2018 8.3 (H) 4.0 - 5.6 % Final     Comment:     According to ADA guidelines, hemoglobin A1c <7.0% represents  optimal control in non-pregnant diabetic patients. Different  metrics may apply to specific patient populations.   Standards of Medical Care in Diabetes-2016.  For the purpose of screening for the presence of diabetes:  <5.7%     Consistent with the absence of diabetes  5.7-6.4%  Consistent with increasing risk for diabetes   (prediabetes)  >or=6.5%  Consistent with diabetes  Currently, no consensus exists for use of hemoglobin A1c  for diagnosis of diabetes for children.  This Hemoglobin A1c assay has significant interference with fetal   hemoglobin   (HbF). The results are invalid for patients with abnormal amounts of   HbF,   including those with known Hereditary Persistence   of Fetal Hemoglobin. Heterozygous hemoglobin variants (HbAS,  HbAC,   HbAD, HbAE, HbA2) do not significantly interfere with this assay;   however, presence of multiple variants in a sample may impact the %   interference.         Creatinine   Date Value Ref Range Status   06/03/2019 1.9 (H) 0.5 - 1.4 mg/dL Final   03/04/2019 1.4 0.5 - 1.4 mg/dL Final   02/25/2019 1.7 (H) 0.5 - 1.4 mg/dL Final   02/05/2019 1.3 0.5 - 1.4 mg/dL Final   12/03/2018 1.3 0.5 - 1.4 mg/dL Final         ROS:  GENERAL:  Weight gain  SKIN: No rashes, no itching.  HEAD: No headaches.  EYES: No visual changes  EARS: No ear pain or changes in hearing.  NOSE: No congestion or rhinorrhea.  MOUTH & THROAT: No hoarseness, change in voice, or sore throat.  NODES: Denies swollen glands.  CHEST: Denies NARAYANAN, cyanosis, wheezing, cough and sputum production.  CARDIOVASCULAR: Denies chest pain, PND, orthopnea.  ABDOMEN: No nausea, vomiting, or changes in bowel function.  URINARY: No flank pain, dysuria or hematuria.  PERIPHERAL VASCULAR: No claudication or cyanosis.  MUSCULOSKELETAL: No new symptoms  NEUROLOGIC: No new symptoms    Vital signs reviewed  PE:   APPEARANCE: Well nourished, well developed, in no acute distress.    HEAD: Normocephalic, atraumatic.  EYES:  Conjunctivae noninjected.  CHEST: Good inspiratory effort. Lungs clear to auscultation with no wheezes or crackles.  CARDIOVASCULAR: Normal S1, S2. No rubs, murmurs, or gallops.  ABDOMEN: Bowel sounds normal. Not distended.  Protuberant abdomen, at baseline.  No tenderness.  EXTREMITIES:  Trace pitting edema BLE      IMPRESSION  1. Uncontrolled type 2 diabetes mellitus with hyperglycemia    2. CKD (chronic kidney disease), stage III    3. Hypertension, unspecified type            PLAN  Discussed risks and benefit of G LP 1 agonist therapy.  Discussed potential issue with his prior history of pancreatitis although he states that he is well aware of these risks and still wants to try the medication.  No recent pancreatitis episode and no current symptoms of  this.  Will start Trulicity 0.75 mg weekly.  Notify at the 1st start of any abdominal pain or vomiting in which case he needs to stop the medication immediately.  He will continue his Levemir 70 units and his NovoLog 20 t.i.d. for now.  If he notices reducing blood sugars, may need to start reducing his insulin regimen.    He was advised on blood sugar monitoring and evaluation of carbohydrate intake.  Provided with literature on this.  Return in 1 month with blood sugar readings pre meal and pre bedtime to decide on further therapy modifications    25 min visit greater than 50% counseling    No orders of the defined types were placed in this encounter.        SCREENINGS  Colonoscopy 2015, return in 10 years  prostate testing 2016/8      Immunizations  Pneumovax up-to-date 2008  Hepatitis B series up-to-date  Patient states he had tetanus shot within the last 10 years  PCV 2018

## 2019-06-04 NOTE — PATIENT INSTRUCTIONS
Www.Diabetes.org (american diabetes association website)    SAMPLE BLOOD SUGAR CHART  Goal blood sugar when checked in the morning before meals: less than 130-140  Goal sugar when checked at least 2 hours after a meal: less than 180               DATE  Before breakfast Before lunch Before dinner Before bedtime                                                                                                                                                  CARBOHYDRATE GOALS: around 3-4 servings per meal (1 serving is 15 grams of total carbohydrates)

## 2019-06-04 NOTE — TELEPHONE ENCOUNTER
Spoke with pt's wife. Reviewed elevated tac level. Per pt's wife, he took his medications prior to labs being drawn. She agreed to have him repeat labs tomorrow.

## 2019-06-05 ENCOUNTER — TELEPHONE (OUTPATIENT)
Dept: TRANSPLANT | Facility: CLINIC | Age: 64
End: 2019-06-05

## 2019-06-05 ENCOUNTER — LAB VISIT (OUTPATIENT)
Dept: LAB | Facility: HOSPITAL | Age: 64
End: 2019-06-05
Attending: INTERNAL MEDICINE
Payer: MEDICARE

## 2019-06-05 DIAGNOSIS — Z94.4 LIVER TRANSPLANTED: ICD-10-CM

## 2019-06-05 LAB
ALBUMIN SERPL BCP-MCNC: 3.6 G/DL (ref 3.5–5.2)
ALP SERPL-CCNC: 81 U/L (ref 55–135)
ALT SERPL W/O P-5'-P-CCNC: 24 U/L (ref 10–44)
ANION GAP SERPL CALC-SCNC: 8 MMOL/L (ref 8–16)
AST SERPL-CCNC: 19 U/L (ref 10–40)
BILIRUB SERPL-MCNC: 0.3 MG/DL (ref 0.1–1)
BUN SERPL-MCNC: 18 MG/DL (ref 8–23)
CALCIUM SERPL-MCNC: 9.2 MG/DL (ref 8.7–10.5)
CHLORIDE SERPL-SCNC: 105 MMOL/L (ref 95–110)
CO2 SERPL-SCNC: 24 MMOL/L (ref 23–29)
CREAT SERPL-MCNC: 1.3 MG/DL (ref 0.5–1.4)
EST. GFR  (AFRICAN AMERICAN): >60 ML/MIN/1.73 M^2
EST. GFR  (NON AFRICAN AMERICAN): 58.9 ML/MIN/1.73 M^2
GLUCOSE SERPL-MCNC: 239 MG/DL (ref 70–110)
POTASSIUM SERPL-SCNC: 3.9 MMOL/L (ref 3.5–5.1)
PROT SERPL-MCNC: 7.2 G/DL (ref 6–8.4)
SODIUM SERPL-SCNC: 137 MMOL/L (ref 136–145)
TACROLIMUS BLD-MCNC: 7.3 NG/ML (ref 5–15)

## 2019-06-05 PROCEDURE — 80053 COMPREHEN METABOLIC PANEL: CPT

## 2019-06-05 PROCEDURE — 80197 ASSAY OF TACROLIMUS: CPT

## 2019-06-05 PROCEDURE — 36415 COLL VENOUS BLD VENIPUNCTURE: CPT | Mod: PO

## 2019-06-06 ENCOUNTER — TELEPHONE (OUTPATIENT)
Dept: TRANSPLANT | Facility: CLINIC | Age: 64
End: 2019-06-06

## 2019-06-06 DIAGNOSIS — Z94.4 LIVER TRANSPLANTED: Primary | ICD-10-CM

## 2019-06-06 NOTE — LETTER
June 6, 2019    Vick Carlos  [address] [address]          Dear Vick Gonzalez:  MRN: [id number]    This is a follow up to your recent labs, your lab results were stable.  There are no medicine changes.  Please have your labs drawn again on 9/3/19.      If you cannot have your labs drawn on the scheduled date, it is your responsibility to call the transplant department to reschedule.  To reschedule or make an appointment, please as to speak to or leave a message for my assistant, Madison Pearce or Laura, at [phone].  When leaving a message for Madison Pearce Angela or myself, we ask that you leave a brief message regarding your request.    Sincerely,    Kandy Herrera, RN, BSN, [facility]  Liver Transplant Coordinator  [facility]  [address] [place] [id number]  [phone]

## 2019-06-06 NOTE — TELEPHONE ENCOUNTER
Called pt's spouse to review stable lab results and that tacrolimus level is normal. No answer and VM full.    Letter sent, labs stable and no medication changes are needed. Repeat labs due 9/3/19 per protocol.

## 2019-07-01 RX ORDER — LISINOPRIL 40 MG/1
40 TABLET ORAL DAILY
Qty: 30 TABLET | Refills: 3 | Status: SHIPPED | OUTPATIENT
Start: 2019-07-01 | End: 2019-07-08 | Stop reason: SDUPTHER

## 2019-07-01 RX ORDER — AMITRIPTYLINE HYDROCHLORIDE 25 MG/1
25 TABLET, FILM COATED ORAL NIGHTLY
Qty: 30 TABLET | Refills: 5 | Status: SHIPPED | OUTPATIENT
Start: 2019-07-01 | End: 2019-07-08 | Stop reason: SDUPTHER

## 2019-07-08 ENCOUNTER — OFFICE VISIT (OUTPATIENT)
Dept: FAMILY MEDICINE | Facility: CLINIC | Age: 64
End: 2019-07-08
Payer: MEDICARE

## 2019-07-08 VITALS
OXYGEN SATURATION: 97 % | HEART RATE: 98 BPM | WEIGHT: 278.44 LBS | HEIGHT: 73 IN | TEMPERATURE: 99 F | SYSTOLIC BLOOD PRESSURE: 130 MMHG | DIASTOLIC BLOOD PRESSURE: 86 MMHG | BODY MASS INDEX: 36.9 KG/M2

## 2019-07-08 DIAGNOSIS — N18.30 CKD (CHRONIC KIDNEY DISEASE), STAGE III: ICD-10-CM

## 2019-07-08 DIAGNOSIS — G47.00 INSOMNIA, UNSPECIFIED TYPE: ICD-10-CM

## 2019-07-08 DIAGNOSIS — E11.65 UNCONTROLLED TYPE 2 DIABETES MELLITUS WITH HYPERGLYCEMIA: Primary | ICD-10-CM

## 2019-07-08 DIAGNOSIS — I10 HYPERTENSION, UNSPECIFIED TYPE: ICD-10-CM

## 2019-07-08 PROCEDURE — 99214 PR OFFICE/OUTPT VISIT, EST, LEVL IV, 30-39 MIN: ICD-10-PCS | Mod: 25,S$PBB,, | Performed by: FAMILY MEDICINE

## 2019-07-08 PROCEDURE — 99999 PR PBB SHADOW E&M-EST. PATIENT-LVL V: CPT | Mod: PBBFAC,,, | Performed by: FAMILY MEDICINE

## 2019-07-08 PROCEDURE — 99214 OFFICE O/P EST MOD 30 MIN: CPT | Mod: 25,S$PBB,, | Performed by: FAMILY MEDICINE

## 2019-07-08 PROCEDURE — 99215 OFFICE O/P EST HI 40 MIN: CPT | Mod: PBBFAC,PO | Performed by: FAMILY MEDICINE

## 2019-07-08 PROCEDURE — 99999 PR PBB SHADOW E&M-EST. PATIENT-LVL V: ICD-10-PCS | Mod: PBBFAC,,, | Performed by: FAMILY MEDICINE

## 2019-07-08 PROCEDURE — G0009 ADMIN PNEUMOCOCCAL VACCINE: HCPCS | Mod: PBBFAC,PO

## 2019-07-08 PROCEDURE — 2024F 7 FLD RTA PHOTO EVC RTNOPTHY: CPT | Mod: ,,, | Performed by: FAMILY MEDICINE

## 2019-07-08 PROCEDURE — 2024F PR 7 FIELD PHOTOS WITH INTERP/ REVIEW: ICD-10-PCS | Mod: ,,, | Performed by: FAMILY MEDICINE

## 2019-07-08 RX ORDER — LISINOPRIL 40 MG/1
40 TABLET ORAL DAILY
Qty: 90 TABLET | Refills: 3 | Status: SHIPPED | OUTPATIENT
Start: 2019-07-08 | End: 2020-04-15

## 2019-07-08 RX ORDER — OXYCODONE HYDROCHLORIDE 15 MG/1
TABLET ORAL
Refills: 0 | COMMUNITY
Start: 2019-06-14 | End: 2019-08-14 | Stop reason: SDUPTHER

## 2019-07-08 RX ORDER — AMITRIPTYLINE HYDROCHLORIDE 50 MG/1
50 TABLET, FILM COATED ORAL NIGHTLY
Qty: 30 TABLET | Refills: 11 | Status: SHIPPED | OUTPATIENT
Start: 2019-07-08 | End: 2020-08-06 | Stop reason: SDUPTHER

## 2019-07-08 NOTE — PROGRESS NOTES
(Portions of this note were dictated using voice recognition software and may contain dictation related errors in spelling/grammar/syntax not found on text review)    CC:   Chief Complaint   Patient presents with    Follow-up     opiod risk tool, pneum, shingles       HPI: 63 y.o. male Last visit June 4, 2019    Here for diabetes follow-up.  History of uncontrolled diabetes, on Levemir 70 units daily with NovoLog 20 t.i.d..  Complicating chronic kidney disease stage 3 and multiple other medical issues listed below.He was on metformin at 1 time in 2016 but had been admitted  with hyperglycemic hyperosmolar state in 2017, medication therapy converted over to basal/prandial insulin    He was concerned about his increasing insulin burden and affect on weight.  Had inquired about G LP 1 agonist therapy at last visit has had heard it would help him lose weight.  He does have a history of pancreatitis in the past possibly complicated either by alcohol or hypertriglyceridemia.  Currently does not drink any alcohol.  Has not had any recurrent abdominal pain issues and was aware of risks of starting medication yet wished to proceed with this.  Started Trulicity at last visit in June at 0.75 mg daily in addition to his basal and prandial insulin with instructions to notify the office immediately for any developing abdominal pain or vomiting.    Currently states that the Trulicity is working well.  Sugar readings as below.  Mostly well controlled.  Denies any significant hypoglycemia.  States that he eats breakfast and lunch but does not have as much of an appetite to eat dinner.  Discussed perhaps just having a snack in the evening.  He  knows not to take his NovoLog when he is not eating.    Hypertension on lisinopril 40 mg daily, metoprolol 200 mg daily, nifedipine 60 mg daily.  Blood pressure on recheck is stable.  Needs lisinopril refilled    Is on amitriptyline 25 mg at nighttime for sleep and neuropathy.  Would like to  see if he can get this dose increased            Past Medical History:   Diagnosis Date    Anemia     Anxiety     Chronic pain syndrome 7/13/2011    CKD (chronic kidney disease), stage III     Diabetes mellitus type II, uncontrolled     Discitis of lumbosacral region 1/16/2015    ED (erectile dysfunction)     Genital herpes     Gout, arthritis     History of alcohol abuse     History of hepatitis C, s/p successful Rx w/ SVR24 (cure) - 5/2018 8/23/2011    Completed 24wks Epclusa + RBV w/SVR12 - 2/2018  -     History of positive PPD, treatment status unknown     Pulmonary granulomas, negative sputum cultures for AFB and indeterminate quantferon test    History of substance abuse     Hypertension     Hypothyroidism     Liver replaced by transplant 8/23/2011    DATE: 12/16/2013  LIVER BIOPSY : REASON:  hep C staging  PATHOLOGY COMMITTEE NOTE/PLAN: :grade  1 / stage 1        Pancreatitis 2016    Peptic ulcer disease        Past Surgical History:   Procedure Laterality Date    BIOPSY, LIVER N/A 12/16/2013    Performed by [facility] at [facility] OR 2ND FLR    BIOPSY, LIVER N/A 12/10/2012    Performed by [facility] at [facility] OR 2ND FLR    CHOLECYSTECTOMY      COLONOSCOPY N/A 6/5/2015    Performed by Sundar Caraballo MD at [facility] ENDO (4TH FLR)    ALCIDES-TRANSFORAMINAL Right 1/15/2015    [facility]    LAMINECTOMY-LUMBAR-DECOMPRESSION L5- S1 exploration of TLIF N/A 6/16/2015    Performed by Migel Crabtree MD at [facility] OR 2ND FLR    LIVER TRANSPLANT  06/2010    MINIMALLY INVASIVE FUSION-TRANSLUMINAL LUMBAR INTERBODY (TLIF) Right 6/15/2015    Performed by Migel [facility] MD at [facility] OR [facility]    SPINE SURGERY         Family History   Problem Relation Age of Onset    Cancer Mother     Diabetes Mother     Heart disease Mother     Diabetes Sister     Cancer Maternal Uncle 82        colon CA    Melanoma Neg Hx     Psoriasis Neg Hx     Lupus Neg  Hx     Eczema Neg Hx     Acne Neg Hx        Social History     Tobacco Use    Smoking status: Former Smoker    Smokeless tobacco: Never Used   Substance Use Topics    Alcohol use: No     Comment: over 5 years ago, none currently    Drug use: No     Lab Results   Component Value Date    WBC 9.91 06/03/2019    HGB 12.7 (L) 06/03/2019    HCT 39.5 (L) 06/03/2019    MCV 91 06/03/2019     06/03/2019    CHOL 170 02/25/2019    TRIG 284 (H) 02/25/2019    HDL 25 (L) 02/25/2019    ALT 24 06/05/2019    AST 19 06/05/2019    BILITOT 0.3 06/05/2019    ALKPHOS 81 06/05/2019     06/05/2019    K 3.9 06/05/2019     06/05/2019    CREATININE 1.3 06/05/2019    CALCIUM 9.2 06/05/2019    ALBUMIN 3.6 06/05/2019    BUN 18 06/05/2019    CO2 24 06/05/2019    TSH 1.924 07/25/2018    PSA 0.30 09/06/2016    INR 1.0 11/01/2017    HGBA1C 9.2 (H) 06/03/2019    LDLCALC 88.2 02/25/2019     (H) 06/05/2019           ROS:  GENERAL: No fever, chills, fatigability or weight loss.  SKIN: No rashes, no itching.  HEAD: No headaches.  EYES: No visual changes  EARS: No ear pain or changes in hearing.  NOSE: No congestion or rhinorrhea.  MOUTH & THROAT: No hoarseness, change in voice, or sore throat.  NODES: Denies swollen glands.  CHEST: Denies NARAYANAN, cyanosis, wheezing, cough and sputum production.  CARDIOVASCULAR: Denies chest pain, PND, orthopnea.  ABDOMEN: No acute symptoms.  URINARY: No flank pain, dysuria or hematuria.  PERIPHERAL VASCULAR: No claudication or cyanosis.  MUSCULOSKELETAL: No acute symptoms      Vital signs reviewed  PE:   APPEARANCE: Well nourished, well developed, in no acute distress.    HEAD: Normocephalic, atraumatic.  EYES: PERRL. EOMI.   Conjunctivae noninjected.  EARS: TM's intact. Light reflex normal. No retraction or perforation  NOSE: Mucosa pink. Airway clear.  MOUTH & THROAT: No tonsillar enlargement. No pharyngeal erythema or exudate.   NECK: Supple with no cervical lymphadenopathy.    CHEST: Good  inspiratory effort. Lungs clear to auscultation with no wheezes or crackles.  CARDIOVASCULAR: Normal S1, S2. No rubs, murmurs, or gallops.  ABDOMEN: Bowel sounds normal. Not distended. Soft. No tenderness or masses. No organomegaly.  EXTREMITIES: No edema, cyanosis, or clubbing.      IMPRESSION  1. Uncontrolled type 2 diabetes mellitus with hyperglycemia    2. CKD (chronic kidney disease), stage III    3. Hypertension, unspecified type    4. Insomnia, unspecified type            PLAN  Sugars are improving on the Trulicity.  Continue this along with Levemir 70 and NovoLog 20 with meals.  Continue to monitor.  May drop down his NovoLog if blood sugars are continuing to be well controlled.    Hypertension controlled.  Refill lisinopril.  Continue metoprolol and nifedipine     increase amitriptyline up to 50 mg nightly.      labs in 3 months as below with visit to follow  Orders Placed This Encounter   Procedures    (In Office Administered) Pneumococcal Polysaccharide Vaccine (23 Valent) (SQ/IM)    CBC auto differential    Comprehensive metabolic panel    Hemoglobin A1c    Lipid panel         SCREENINGS   Colonoscopy 2015, return in 10 years   prostate testing 2016/8   Immunizations   Pneumovax  2008 , booster due  PCV 10/29/2018     Hepatitis B series up-to-date   Patient states he had tetanus shot around 2015

## 2019-07-10 ENCOUNTER — HOSPITAL ENCOUNTER (EMERGENCY)
Facility: HOSPITAL | Age: 64
Discharge: HOME | End: 2019-07-10
Attending: EMERGENCY MEDICINE
Payer: MEDICARE

## 2019-07-10 VITALS
TEMPERATURE: 98 F | HEIGHT: 73 IN | BODY MASS INDEX: 34.72 KG/M2 | SYSTOLIC BLOOD PRESSURE: 145 MMHG | DIASTOLIC BLOOD PRESSURE: 80 MMHG | OXYGEN SATURATION: 100 % | RESPIRATION RATE: 25 BRPM | WEIGHT: 262 LBS | HEART RATE: 75 BPM

## 2019-07-10 DIAGNOSIS — R11.0 NAUSEA: ICD-10-CM

## 2019-07-10 DIAGNOSIS — R10.10 UPPER ABDOMINAL PAIN: Primary | ICD-10-CM

## 2019-07-10 DIAGNOSIS — R10.9 ABDOMINAL PAIN: ICD-10-CM

## 2019-07-10 LAB
ALBUMIN SERPL BCP-MCNC: 3.9 G/DL (ref 3.5–5.2)
ALP SERPL-CCNC: 73 U/L (ref 55–135)
ALT SERPL W/O P-5'-P-CCNC: 41 U/L (ref 10–44)
ANION GAP SERPL CALC-SCNC: 11 MMOL/L (ref 8–16)
APTT BLDCRRT: 24.8 SEC (ref 21–32)
AST SERPL-CCNC: 51 U/L (ref 10–40)
BASOPHILS # BLD AUTO: 0 K/UL (ref 0–0.2)
BASOPHILS NFR BLD: 0 % (ref 0–1.9)
BILIRUB SERPL-MCNC: 0.5 MG/DL (ref 0.1–1)
BILIRUB UR QL STRIP: NEGATIVE
BUN SERPL-MCNC: 25 MG/DL (ref 8–23)
CALCIUM SERPL-MCNC: 9.5 MG/DL (ref 8.7–10.5)
CHLORIDE SERPL-SCNC: 102 MMOL/L (ref 95–110)
CLARITY UR: CLEAR
CO2 SERPL-SCNC: 23 MMOL/L (ref 23–29)
COLOR UR: YELLOW
CREAT SERPL-MCNC: 1.5 MG/DL (ref 0.5–1.4)
DIFFERENTIAL METHOD: ABNORMAL
EOSINOPHIL # BLD AUTO: 0.2 K/UL (ref 0–0.5)
EOSINOPHIL NFR BLD: 1.8 % (ref 0–8)
ERYTHROCYTE [DISTWIDTH] IN BLOOD BY AUTOMATED COUNT: 13.7 % (ref 11.5–14.5)
EST. GFR  (AFRICAN AMERICAN): 57 ML/MIN/1.73 M^2
EST. GFR  (NON AFRICAN AMERICAN): 50 ML/MIN/1.73 M^2
GLUCOSE SERPL-MCNC: 151 MG/DL (ref 70–110)
GLUCOSE UR QL STRIP: NEGATIVE
HCT VFR BLD AUTO: 39.4 % (ref 40–54)
HGB BLD-MCNC: 13.1 G/DL (ref 14–18)
HGB UR QL STRIP: NEGATIVE
INR PPP: 1 (ref 0.8–1.2)
KETONES UR QL STRIP: NEGATIVE
LACTATE SERPL-SCNC: 1.4 MMOL/L (ref 0.5–2.2)
LEUKOCYTE ESTERASE UR QL STRIP: NEGATIVE
LIPASE SERPL-CCNC: 53 U/L (ref 4–60)
LYMPHOCYTES # BLD AUTO: 2.2 K/UL (ref 1–4.8)
LYMPHOCYTES NFR BLD: 23.8 % (ref 18–48)
MCH RBC QN AUTO: 28.8 PG (ref 27–31)
MCHC RBC AUTO-ENTMCNC: 33.2 G/DL (ref 32–36)
MCV RBC AUTO: 87 FL (ref 82–98)
MONOCYTES # BLD AUTO: 0.5 K/UL (ref 0.3–1)
MONOCYTES NFR BLD: 5.8 % (ref 4–15)
NEUTROPHILS # BLD AUTO: 6.2 K/UL (ref 1.8–7.7)
NEUTROPHILS NFR BLD: 68.6 % (ref 38–73)
NITRITE UR QL STRIP: NEGATIVE
PH UR STRIP: 6 [PH] (ref 5–8)
PLATELET # BLD AUTO: 168 K/UL (ref 150–350)
PMV BLD AUTO: 12 FL (ref 9.2–12.9)
POCT GLUCOSE: 155 MG/DL (ref 70–110)
POTASSIUM SERPL-SCNC: 3.9 MMOL/L (ref 3.5–5.1)
PROT SERPL-MCNC: 7.7 G/DL (ref 6–8.4)
PROT UR QL STRIP: ABNORMAL
PROTHROMBIN TIME: 10.3 SEC (ref 9–12.5)
RBC # BLD AUTO: 4.55 M/UL (ref 4.6–6.2)
SODIUM SERPL-SCNC: 136 MMOL/L (ref 136–145)
SP GR UR STRIP: >=1.03 (ref 1–1.03)
TROPONIN I SERPL DL<=0.01 NG/ML-MCNC: 0.02 NG/ML (ref 0–0.03)
URN SPEC COLLECT METH UR: ABNORMAL
UROBILINOGEN UR STRIP-ACNC: NEGATIVE EU/DL
WBC # BLD AUTO: 9.03 K/UL (ref 3.9–12.7)

## 2019-07-10 PROCEDURE — 85610 PROTHROMBIN TIME: CPT

## 2019-07-10 PROCEDURE — 83605 ASSAY OF LACTIC ACID: CPT

## 2019-07-10 PROCEDURE — 25000003 PHARM REV CODE 250: Performed by: NURSE PRACTITIONER

## 2019-07-10 PROCEDURE — 80053 COMPREHEN METABOLIC PANEL: CPT

## 2019-07-10 PROCEDURE — 82962 GLUCOSE BLOOD TEST: CPT

## 2019-07-10 PROCEDURE — 81003 URINALYSIS AUTO W/O SCOPE: CPT

## 2019-07-10 PROCEDURE — 63600175 PHARM REV CODE 636 W HCPCS: Performed by: NURSE PRACTITIONER

## 2019-07-10 PROCEDURE — 96361 HYDRATE IV INFUSION ADD-ON: CPT

## 2019-07-10 PROCEDURE — 85025 COMPLETE CBC W/AUTO DIFF WBC: CPT

## 2019-07-10 PROCEDURE — 96374 THER/PROPH/DIAG INJ IV PUSH: CPT

## 2019-07-10 PROCEDURE — 85730 THROMBOPLASTIN TIME PARTIAL: CPT

## 2019-07-10 PROCEDURE — 99285 EMERGENCY DEPT VISIT HI MDM: CPT | Mod: 25

## 2019-07-10 PROCEDURE — 83690 ASSAY OF LIPASE: CPT

## 2019-07-10 PROCEDURE — 84484 ASSAY OF TROPONIN QUANT: CPT

## 2019-07-10 PROCEDURE — 93005 ELECTROCARDIOGRAM TRACING: CPT

## 2019-07-10 PROCEDURE — 96375 TX/PRO/DX INJ NEW DRUG ADDON: CPT

## 2019-07-10 RX ORDER — ONDANSETRON 4 MG/1
4 TABLET, ORALLY DISINTEGRATING ORAL EVERY 8 HOURS PRN
Qty: 10 TABLET | Refills: 0 | Status: SHIPPED | OUTPATIENT
Start: 2019-07-10 | End: 2020-12-01

## 2019-07-10 RX ORDER — MORPHINE SULFATE 4 MG/ML
4 INJECTION, SOLUTION INTRAMUSCULAR; INTRAVENOUS
Status: COMPLETED | OUTPATIENT
Start: 2019-07-10 | End: 2019-07-10

## 2019-07-10 RX ORDER — HYDROMORPHONE HYDROCHLORIDE 1 MG/ML
1 INJECTION, SOLUTION INTRAMUSCULAR; INTRAVENOUS; SUBCUTANEOUS
Status: COMPLETED | OUTPATIENT
Start: 2019-07-10 | End: 2019-07-10

## 2019-07-10 RX ORDER — ONDANSETRON 2 MG/ML
4 INJECTION INTRAMUSCULAR; INTRAVENOUS
Status: COMPLETED | OUTPATIENT
Start: 2019-07-10 | End: 2019-07-10

## 2019-07-10 RX ORDER — SODIUM CHLORIDE 9 MG/ML
1000 INJECTION, SOLUTION INTRAVENOUS
Status: COMPLETED | OUTPATIENT
Start: 2019-07-10 | End: 2019-07-10

## 2019-07-10 RX ORDER — PANTOPRAZOLE SODIUM 20 MG/1
20 TABLET, DELAYED RELEASE ORAL DAILY
Qty: 30 TABLET | Refills: 0 | Status: SHIPPED | OUTPATIENT
Start: 2019-07-10 | End: 2020-12-01

## 2019-07-10 RX ADMIN — SODIUM CHLORIDE 1000 ML: 0.9 INJECTION, SOLUTION INTRAVENOUS at 03:07

## 2019-07-10 RX ADMIN — MORPHINE SULFATE 4 MG: 4 INJECTION INTRAVENOUS at 03:07

## 2019-07-10 RX ADMIN — HYDROMORPHONE HYDROCHLORIDE 1 MG: 1 INJECTION, SOLUTION INTRAMUSCULAR; INTRAVENOUS; SUBCUTANEOUS at 04:07

## 2019-07-10 RX ADMIN — ONDANSETRON 4 MG: 2 INJECTION INTRAMUSCULAR; INTRAVENOUS at 03:07

## 2019-07-10 NOTE — ED PROVIDER NOTES
"Encounter Date: 7/10/2019       History     Chief Complaint   Patient presents with    Abdominal Pain     abd pain since this am. denies associated s/s described pain as "sticking"      63-year-old male with an extensive past medical history presents the ED for abdominal pain.  The patient states that he awoke with upper abdominal pain this morning that feels like sticking.  He reports nausea and diarrhea.  No vomiting, trauma, fever, chest pain, or shortness of breath. No rectal bleeding.  No urinary symptoms. He states that he feels like his abdomen is a little swollen.  The patient does have history of liver transplant (2009) and is established with the transplant team at [facility].  He reports that he saw his doctor a few days ago and everything was fine at that appointment.  He reports compliance with his home medications.  No other complaints at this time.    The history is provided by the patient and medical records.     Review of patient's allergies indicates:  No Known Allergies  Past Medical History:   Diagnosis Date    Anemia     Anxiety     Chronic pain syndrome 7/13/2011    CKD (chronic kidney disease), stage III     Diabetes mellitus type II, uncontrolled     Discitis of lumbosacral region 1/16/2015    ED (erectile dysfunction)     Genital herpes     Gout, arthritis     History of alcohol abuse     History of hepatitis C, s/p successful Rx w/ SVR24 (cure) - 5/2018 8/23/2011    Completed 24wks Epclusa + RBV w/SVR12 - 2/2018  -     History of positive PPD, treatment status unknown     Pulmonary granulomas, negative sputum cultures for AFB and indeterminate quantferon test    History of substance abuse     Hypertension     Hypothyroidism     Liver replaced by transplant 8/23/2011    DATE: 12/16/2013  LIVER BIOPSY : REASON:  hep C staging  PATHOLOGY COMMITTEE NOTE/PLAN: :grade  1 / stage 1        Pancreatitis 2016    Peptic ulcer disease      Past Surgical History: "   Procedure Laterality Date    BIOPSY, LIVER N/A 12/16/2013    Performed by [facility] Diagnostic Provider at [facility] OR 2ND FLR    BIOPSY, LIVER N/A 12/10/2012    Performed by [facility] Diagnostic Provider at [facility] OR 2ND FLR    CHOLECYSTECTOMY      COLONOSCOPY N/A 6/5/2015    Performed by Sundar Caraballo MD at [facility] ENDO (4TH FLR)    ALCIDES-TRANSFORAMINAL Right 1/15/2015    Performed by Thu Penn MD at [facility] PAIN MGT    LAMINECTOMY-LUMBAR-DECOMPRESSION L5- S1 exploration of TLIF N/A 6/16/2015    Performed by Migel Crabtree MD at [facility] OR 2ND FLR    LIVER TRANSPLANT  06/2010    MINIMALLY INVASIVE FUSION-TRANSLUMINAL LUMBAR INTERBODY (TLIF) Right 6/15/2015    Performed by Migel Crabtree [facility] at [facility] OR 2ND FLR    SPINE SURGERY       Family History   Problem Relation Age of Onset    Cancer Mother     Diabetes Mother     Heart disease Mother     Diabetes Sister     Cancer Maternal Uncle 82        colon CA    Melanoma Neg Hx     Psoriasis Neg Hx     Lupus Neg Hx     Eczema Neg Hx     Acne Neg Hx      Social History     Tobacco Use    Smoking status: Former Smoker    Smokeless tobacco: Never Used   Substance Use Topics    Alcohol use: No     Comment: over 5 years ago, none currently    Drug use: No     Review of Systems   Constitutional: Positive for activity change and appetite change. Negative for fever.   HENT: Negative for congestion.    Respiratory: Negative for cough and shortness of breath.    Cardiovascular: Negative for chest pain.   Gastrointestinal: Positive for abdominal pain and nausea. Negative for blood in stool, constipation and vomiting.   Genitourinary: Negative for dysuria.   Musculoskeletal: Negative for back pain.   Skin: Negative for color change.   Neurological: Negative for weakness.   Psychiatric/Behavioral: Negative for confusion.       Physical Exam     Initial Vitals [07/10/19 1348]   BP Pulse Resp Temp SpO2   137/79 86 18 97.7 °F (36.5 °C) 97 %      MAP       --         Physical  Exam    ED Course   Procedures  Labs Reviewed   URINALYSIS, REFLEX TO URINE CULTURE - Abnormal; Notable for the following components:       Result Value    Specific Gravity, UA >=1.030 (*)     Protein, UA Trace (*)     All other components within normal limits    Narrative:     Preferred Collection Type->Urine, Clean Catch   CBC W/ AUTO DIFFERENTIAL - Abnormal; Notable for the following components:    RBC 4.55 (*)     Hemoglobin 13.1 (*)     Hematocrit 39.4 (*)     All other components within normal limits   COMPREHENSIVE METABOLIC PANEL - Abnormal; Notable for the following components:    Glucose 151 (*)     BUN, Bld 25 (*)     Creatinine 1.5 (*)     AST 51 (*)     eGFR if  57 (*)     eGFR if non  50 (*)     All other components within normal limits   POCT GLUCOSE - Abnormal; Notable for the following components:    POCT Glucose 155 (*)     All other components within normal limits   LIPASE   APTT   LACTIC ACID, PLASMA   PROTIME-INR   TROPONIN I   URINALYSIS, REFLEX TO URINE CULTURE   POCT GLUCOSE MONITORING CONTINUOUS          Imaging Results          CT Abdomen Pelvis  Without Contrast (Final result)  Result time 07/10/19 16:14:03    Final result by Jose Queen MD (07/10/19 16:14:03)                 Impression:      1. Minimal fluid adjacent to the inferior aspect of the right hepatic lobe, finding is nonspecific in this hepatic transplantation patient.  Correlation with LFTs and volume status recommended.  2. Bilateral perinephric fat stranding, somewhat increased since the previous exam, again, correlation with volume status is recommended.  No findings to suggest obstructive uropathy.  There is right nonobstructive nephrolithiasis.  3. Scattered pulmonary and right hilar calcified granulomas.  4. Surgical change of the spine, atherosclerotic calcification of the aorta, and several additional findings above.      Electronically signed by: Jose Queen  MD  Date:    07/10/2019  Time:    16:14             Narrative:    EXAMINATION:  CT ABDOMEN PELVIS WITHOUT CONTRAST    CLINICAL HISTORY:  Abdominal pain, unspecified;Abd swelling, ascites suspected;    TECHNIQUE:  Low dose axial images, sagittal and coronal reformations were obtained from the lung bases to the pubic symphysis.  Oral contrast was not administered.    COMPARISON:  12/19/2017    FINDINGS:  Images of the lower thorax are remarkable for bilateral dependent atelectasis/scarring.  There are bilateral calcified granulomas, noting prominent right hilar calcification.  There is left pleural thickening, similar to the previous exam.  There is calcification in the distribution of the coronary arteries.    The hepatic parenchyma is hypoattenuating, correlation with LFTs recommended in this patient status post hepatic transplantation.  There is a small amount of fluid adjacent to the inferior aspect of the right hepatic lobe.  There are calcified granulomas within the spleen.  The pancreas, and adrenal glands are unremarkable for noncontrast technique.  There is no biliary dilation or ascites.  The pancreatic duct is not dilated.  No significant abdominal lymphadenopathy.    There is bilateral perinephric fat stranding, nonspecific.  There is right nonobstructive nephrolithiasis, no left nephrolithiasis.  There is mild prominence of the right renal collecting system.  No left hydronephrosis.  The bilateral ureters are unremarkable without calculi seen.  The urinary bladder is unremarkable.  The prostate is not enlarged.    There are a few colonic diverticula without inflammation.  The large bowel is otherwise grossly unremarkable.  The terminal ileum is unremarkable.  There is a focus of calcification at the proximal aspect of the appendix, no appendiceal enlargement.  The small bowel is grossly unremarkable.  There are a few scattered shotty periaortic and paracaval lymph nodes.  There is atherosclerotic  calcification of the aorta and its branches.  No focal organized pelvic fluid collection.    Surgical changes are noted of the spine.  There is osteopenia.  Remote appearing left rib injuries noted.  Posterior spinal fusion hardware spans L4-L5.  There is stable grade 1 anterolisthesis of L4 on L5.  No significant inguinal lymphadenopathy.  Surgical change noted of the anterior abdominal wall.                                 Medical Decision Making:   Initial Assessment:   63-year-old male with past medical history of liver transplant is here for abdominal pain and nausea.  No trauma. He reports compliance with his home medications.  No fever.  Differential Diagnosis:   GERD, intestinal spasm, gastroenteritis, gastritis, ulcer, cholecystitis, gallstones, pancreatitis, ileus, small bowel obstruction, appendicitis, constipation, intestinal gas pain, transplant problem.    Clinical Tests:   Lab Tests: Ordered and Reviewed  Radiological Study: Ordered and Reviewed  Medical Tests: Ordered and Reviewed  ED Management:  Labs, CT scan, EKG, IV fluids, morphine, Dilaudid, Zofran  Other:   I have discussed this case with another health care provider.       <> Summary of the Discussion: Discussed HPI, ED course, and results of imaging and labs with , transplant team.  Advised starting patient on PPI and have him call his transplant coordinator tomorrow to schedule f/u appointment.   Discussed with Dr. LeFort who agrees the ED course and disposition.  Abdominal pain significantly improved after ED interventions.  No vomiting while in the ED.  There was minimal fluid adjacent to the inferior aspect of the right hepatic lobe.  Mildly elevated AST at 51, LFTs otherwise normal.  These findings were discussed with the transplant team who feels the patient may follow up outpatient.  Advised placing the patient on PPI.  I advised the patient to call his transplant coordinator tomorrow to schedule his follow-up appointment.   Return to the ED if his condition changes, progresses, or if there are any concerns.  Rx Zofran ODT and Protonix.  The patient verbalized understanding, compliance, and agreement with the treatment plan.                      Clinical Impression:       ICD-10-CM ICD-9-CM   1. Upper abdominal pain R10.10 789.09   2. Abdominal pain R10.9 789.00   3. Nausea R11.0 787.02                                Kourtney Musa, [facility]  07/10/19 7034

## 2019-07-10 NOTE — ED NOTES
Pt given urinal and encouraged to produce urine sample. Pt verbalized understanding. Comfort measures initiated. Care plan discussed with pt. Call light with reach of pt. Will continue to monitor.

## 2019-07-11 NOTE — ED TRIAGE NOTES
Patient complains of epigastric pain since he woke this morning. He states he only at [facility]. Patient states he was worried because he is a previous transplant patient.

## 2019-07-18 NOTE — LETTER
August 29, 2018    Vick Gonzalez  [address] [facility] [address]          Dear Vick Gonzalez:  MRN: [id number]    Your lab results were stable.  There are no medicine changes.  Please have your labs drawn again on 12/3/18.      If you cannot have your labs drawn on the scheduled date, it is your responsibility to call the transplant department to reschedule.  To reschedule or make an appointment, please as to speak to or leave a message for my assistant, Portia Lincoln, at [phone].  When leaving a message for Latonia Pearce, or myself, we ask that you leave a brief message regarding your request.    Sincerely,    Kandy Herrera, RN, BSN  Liver Transplant Coordinator  [facility]  [address] [place] [id number] [phone]        Please call patient calcium is still elevated which can be caused by disorder of parathyroid gland, recommended Endocrinology referral.  Iron, vitamin B12, folic acid normal but red blood cells count still low, anemia is most likely due to chronic underlying disease. Recommended to see Gastroenterologist.

## 2019-08-12 ENCOUNTER — PROCEDURE VISIT (OUTPATIENT)
Dept: HEPATOLOGY | Facility: CLINIC | Age: 64
End: 2019-08-12
Attending: INTERNAL MEDICINE
Payer: MEDICARE

## 2019-08-12 ENCOUNTER — HOSPITAL ENCOUNTER (OUTPATIENT)
Dept: RADIOLOGY | Facility: HOSPITAL | Age: 64
Discharge: HOME | End: 2019-08-12
Attending: INTERNAL MEDICINE
Payer: MEDICARE

## 2019-08-12 ENCOUNTER — OFFICE VISIT (OUTPATIENT)
Dept: TRANSPLANT | Facility: CLINIC | Age: 64
End: 2019-08-12
Payer: MEDICARE

## 2019-08-12 ENCOUNTER — TELEPHONE (OUTPATIENT)
Dept: TRANSPLANT | Facility: CLINIC | Age: 64
End: 2019-08-12

## 2019-08-12 VITALS
SYSTOLIC BLOOD PRESSURE: 135 MMHG | RESPIRATION RATE: 18 BRPM | OXYGEN SATURATION: 98 % | DIASTOLIC BLOOD PRESSURE: 84 MMHG | HEIGHT: 73 IN | BODY MASS INDEX: 37.69 KG/M2 | HEART RATE: 95 BPM | TEMPERATURE: 98 F | WEIGHT: 284.38 LBS

## 2019-08-12 DIAGNOSIS — K76.0 NAFLD (NONALCOHOLIC FATTY LIVER DISEASE): ICD-10-CM

## 2019-08-12 DIAGNOSIS — K76.0 NAFLD (NONALCOHOLIC FATTY LIVER DISEASE): Primary | ICD-10-CM

## 2019-08-12 DIAGNOSIS — Z94.4 LIVER TRANSPLANTED: ICD-10-CM

## 2019-08-12 DIAGNOSIS — Z94.4 LIVER TRANSPLANTED: Primary | ICD-10-CM

## 2019-08-12 PROCEDURE — 99214 OFFICE O/P EST MOD 30 MIN: CPT | Mod: S$PBB,,, | Performed by: INTERNAL MEDICINE

## 2019-08-12 PROCEDURE — 93975 US LIVER TRANSPLANT POST: ICD-10-PCS | Mod: 26,,, | Performed by: INTERNAL MEDICINE

## 2019-08-12 PROCEDURE — 76705 US LIVER TRANSPLANT POST: ICD-10-PCS | Mod: 26,XS,, | Performed by: INTERNAL MEDICINE

## 2019-08-12 PROCEDURE — 99214 PR OFFICE/OUTPT VISIT, EST, LEVL IV, 30-39 MIN: ICD-10-PCS | Mod: S$PBB,,, | Performed by: INTERNAL MEDICINE

## 2019-08-12 PROCEDURE — 91200 PR LIVER ELASTOGRAPHY W/OUT IMAG W/INTERP & REPORT: ICD-10-PCS | Mod: 26,S$PBB,, | Performed by: INTERNAL MEDICINE

## 2019-08-12 PROCEDURE — 91200 LIVER ELASTOGRAPHY: CPT | Mod: PBBFAC | Performed by: INTERNAL MEDICINE

## 2019-08-12 PROCEDURE — 99999 PR PBB SHADOW E&M-EST. PATIENT-LVL III: CPT | Mod: PBBFAC,,, | Performed by: INTERNAL MEDICINE

## 2019-08-12 PROCEDURE — 99999 PR PBB SHADOW E&M-EST. PATIENT-LVL III: ICD-10-PCS | Mod: PBBFAC,,, | Performed by: INTERNAL MEDICINE

## 2019-08-12 PROCEDURE — 91200 LIVER ELASTOGRAPHY: CPT | Mod: 26,S$PBB,, | Performed by: INTERNAL MEDICINE

## 2019-08-12 PROCEDURE — 76705 ECHO EXAM OF ABDOMEN: CPT | Mod: 26,XS,, | Performed by: INTERNAL MEDICINE

## 2019-08-12 PROCEDURE — 93975 VASCULAR STUDY: CPT | Mod: TC

## 2019-08-12 PROCEDURE — 99213 OFFICE O/P EST LOW 20 MIN: CPT | Mod: PBBFAC,25 | Performed by: INTERNAL MEDICINE

## 2019-08-12 PROCEDURE — 93975 VASCULAR STUDY: CPT | Mod: 26,,, | Performed by: INTERNAL MEDICINE

## 2019-08-12 PROCEDURE — 76705 ECHO EXAM OF ABDOMEN: CPT | Mod: TC

## 2019-08-12 NOTE — LETTER
August 12, 2019        Emanuel Lopez  [address]  [address]  [place] [address]  Phone: [phone]  Fax: [phone]             [facility] - Liver Transplant  [address]  [facility] [address]  Phone: [phone]   Patient: Vick Gonzalez   MR Number: [id number]   YOB: 1955   Date of Visit: 8/12/2019       Dear Dr. Emanuel Lopez    Thank you for referring Vick Gonzalez to me for evaluation. Attached you will find relevant portions of my assessment and plan of care.    If you have questions, please do not hesitate to call me. I look forward to following Vick Gonzalez along with you.    Sincerely,    James Coleman MD    Enclosure    If you would like to receive this communication electronically, please contact [email or web address] or [phone] to request [employer] access.    [employer] is a tool which provides read-only access to select patient information with whom you have a relationship. Its easy to use and provides real time access to review your patients record including encounter summaries, notes, results, and demographic information.    If you feel you have received this communication in error or would no longer like to receive these types of communications, please e-mail [email or web address]

## 2019-08-12 NOTE — PROGRESS NOTES
Subjective:       Patient ID: Vick Gonzalez is a 63 y.o. male.    Chief Complaint: Liver Transplant Follow-up    HPI  I saw Vick Gonzalez today in the Post-Liver Transplant Clinic. 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 He has been doing well post transplant. He feels well with no symptoms of advanced chronic liver   Disease.    His last liver bx in Dec 2013 showed stage 1 fibrosis only.  Fibrosure showed F4 but his fibroscan today showed F2.    He is maintained on tacrolimus 2/1 mg daily..    Treated for HCV- ledip/sof and ribavirin but relapsed.  SVR with Epclusa + riba for  24 weeks.    LFTs- normal  Creatinine 1.3    BP managed by PCP  Body mass index is 37.52 kg/m².      Abdo US: 8/12/2019  1. Diffusely hyperechoic hepatic parenchyma consistent with hepatic steatosis.  2. Satisfactory spectral Doppler evaluation, resistive indices within normal range      Lab Results   Component Value Date    ALT 41 07/10/2019    AST 51 (H) 07/10/2019     (H) 04/24/2017    ALKPHOS 73 07/10/2019    BILITOT 0.5 07/10/2019       Review of Systems   Constitutional: Negative for activity change, appetite change, fatigue, fever and unexpected weight change.   HENT: Negative.  Negative for ear pain, hearing loss, sinus pressure and tinnitus.    Eyes: Negative.  Negative for photophobia, discharge, itching and visual disturbance.   Respiratory: Negative.  Negative for cough, chest tightness and shortness of breath.    Cardiovascular: Negative for chest pain, palpitations and leg swelling.   Gastrointestinal: Negative for constipation, diarrhea, nausea and vomiting.   Genitourinary: Negative for decreased urine volume, difficulty urinating, dysuria, frequency (nocturia x 2) and urgency.   Musculoskeletal: Negative.  Negative for arthralgias, back pain, gait problem, joint swelling and myalgias.   Skin: Negative.    Neurological: Negative  for dizziness, seizures, numbness and headaches.   Hematological: Negative for adenopathy. Does not bruise/bleed easily.   Psychiatric/Behavioral: Negative for dysphoric mood and sleep disturbance. The patient is not nervous/anxious.          Objective:      Physical Exam   Constitutional: He is oriented to person, place, and time. He appears well-developed and well-nourished.   HENT:   Head: Normocephalic and atraumatic.   Eyes: Pupils are equal, round, and reactive to light. Conjunctivae are normal.   Neck: Normal range of motion. No thyromegaly present.   Cardiovascular: Normal rate, regular rhythm and normal heart sounds.   No murmur heard.  Pulmonary/Chest: Effort normal and breath sounds normal. He has no wheezes.   Abdominal: Soft. Bowel sounds are normal. He exhibits no distension and no mass. There is no tenderness.   Musculoskeletal: He exhibits no edema.   Lymphadenopathy:     He has no cervical adenopathy.   Neurological: He is alert and oriented to person, place, and time.   Skin: Skin is warm. No rash noted. No erythema.   Psychiatric: He has a normal mood and affect. His behavior is normal.       Assessment:       1. NAFLD (nonalcoholic fatty liver disease)        Plan:   No immunosuppressive changes- continue tac 2/1 mg  Stage 2 fibrosis on fibroscan    - reminded of the need to lose weight and given advice to cut down on carbs  - improve DM control  Clinic in 1 year.  Labs monthly.

## 2019-08-12 NOTE — PROCEDURES
Fibroscan Procedure     Name: Vick Gonzalez  Date of Procedure : 2019   :: James Coleman MD  Diagnosis: NAFLD    Probe: XL    Fibroscan readin KPa    Fibrosis:F2     CAP readin dB/m    Steatosis: :S3

## 2019-08-14 ENCOUNTER — LAB VISIT (OUTPATIENT)
Dept: LAB | Facility: OTHER | Age: 64
End: 2019-08-14
Payer: MEDICARE

## 2019-08-14 ENCOUNTER — OFFICE VISIT (OUTPATIENT)
Dept: PAIN MEDICINE | Facility: CLINIC | Age: 64
End: 2019-08-14
Payer: MEDICARE

## 2019-08-14 VITALS
DIASTOLIC BLOOD PRESSURE: 83 MMHG | SYSTOLIC BLOOD PRESSURE: 124 MMHG | TEMPERATURE: 98 F | WEIGHT: 278.25 LBS | HEIGHT: 73 IN | RESPIRATION RATE: 18 BRPM | HEART RATE: 90 BPM | BODY MASS INDEX: 36.88 KG/M2

## 2019-08-14 DIAGNOSIS — Z79.891 ENCOUNTER FOR MONITORING OPIOID MAINTENANCE THERAPY: ICD-10-CM

## 2019-08-14 DIAGNOSIS — Z51.81 ENCOUNTER FOR MONITORING OPIOID MAINTENANCE THERAPY: ICD-10-CM

## 2019-08-14 DIAGNOSIS — G89.4 CHRONIC PAIN SYNDROME: ICD-10-CM

## 2019-08-14 DIAGNOSIS — Z98.1 S/P LUMBAR SPINAL FUSION: ICD-10-CM

## 2019-08-14 DIAGNOSIS — M51.36 DDD (DEGENERATIVE DISC DISEASE), LUMBAR: ICD-10-CM

## 2019-08-14 DIAGNOSIS — M43.10 ACQUIRED SPONDYLOLISTHESIS: ICD-10-CM

## 2019-08-14 DIAGNOSIS — M96.1 POSTLAMINECTOMY SYNDROME OF LUMBAR REGION: Primary | ICD-10-CM

## 2019-08-14 DIAGNOSIS — M54.16 LUMBAR RADICULOPATHY: ICD-10-CM

## 2019-08-14 DIAGNOSIS — M96.1 POSTLAMINECTOMY SYNDROME OF LUMBAR REGION: ICD-10-CM

## 2019-08-14 PROCEDURE — 80307 DRUG TEST PRSMV CHEM ANLYZR: CPT

## 2019-08-14 PROCEDURE — 99999 PR PBB SHADOW E&M-EST. PATIENT-LVL III: CPT | Mod: PBBFAC,,, | Performed by: NURSE PRACTITIONER

## 2019-08-14 PROCEDURE — 99214 PR OFFICE/OUTPT VISIT, EST, LEVL IV, 30-39 MIN: ICD-10-PCS | Mod: S$PBB,,, | Performed by: NURSE PRACTITIONER

## 2019-08-14 PROCEDURE — 99213 OFFICE O/P EST LOW 20 MIN: CPT | Mod: PBBFAC | Performed by: NURSE PRACTITIONER

## 2019-08-14 PROCEDURE — 99214 OFFICE O/P EST MOD 30 MIN: CPT | Mod: S$PBB,,, | Performed by: NURSE PRACTITIONER

## 2019-08-14 PROCEDURE — 99999 PR PBB SHADOW E&M-EST. PATIENT-LVL III: ICD-10-PCS | Mod: PBBFAC,,, | Performed by: NURSE PRACTITIONER

## 2019-08-14 PROCEDURE — 36415 COLL VENOUS BLD VENIPUNCTURE: CPT

## 2019-08-14 RX ORDER — OXYCODONE HYDROCHLORIDE 15 MG/1
15 TABLET ORAL EVERY 6 HOURS PRN
Qty: 120 TABLET | Refills: 0 | Status: SHIPPED | OUTPATIENT
Start: 2019-08-14 | End: 2019-09-10 | Stop reason: SDUPTHER

## 2019-08-14 RX ORDER — OXYCODONE HYDROCHLORIDE 15 MG/1
15 TABLET ORAL EVERY 6 HOURS PRN
Qty: 120 TABLET | Refills: 0 | Status: SHIPPED | OUTPATIENT
Start: 2019-08-14 | End: 2019-08-14 | Stop reason: SDUPTHER

## 2019-08-14 RX ORDER — OXYCODONE HYDROCHLORIDE 15 MG/1
15 TABLET ORAL EVERY 6 HOURS PRN
Qty: 120 TABLET | Refills: 0 | Status: SHIPPED | OUTPATIENT
Start: 2019-09-13 | End: 2019-08-14

## 2019-08-14 RX ORDER — OXYCODONE HYDROCHLORIDE 15 MG/1
15 TABLET ORAL EVERY 6 HOURS PRN
Qty: 120 TABLET | Refills: 0 | Status: SHIPPED | OUTPATIENT
Start: 2019-10-11 | End: 2019-08-14

## 2019-08-14 NOTE — PROGRESS NOTES
Chronic patient Established Note (Follow up visit)      SUBJECTIVE:    Vick Gonzalez presents to the clinic for a follow-up appointment for lower back pain and medication refill. He continues to report low back pain that radiates down the posterior aspect of both legs to his feet. He describes this pain as shooting and tingling in nature. His pain is worse with prolonged walking and activity. He is actively trying to lose weight. He is trying to increase his physical activity. He continues to report benefit with current medication regimen. He takes Gabapentin with benefit. He continues to take Roxicodone 15 mg as needed with benefit. He denies any adverse effects. His pain today is 7/10.      Pain Disability Index Review:  Last 3 PDI Scores 5/15/2019 2/13/2019 11/13/2018   Pain Disability Index (PDI) 24 26 28       Pain Medications:    - Opioids: Roxicodone (Oxycodone/Acetaminophen) 15 mg QID PRN pain    Others: Gabapentin 2400 mg daily    Opioid Contract: yes     report:  Reviewed and consistent with medication use as prescribed.    Pain Procedures:   1/15/15 Right TFESI @ L5 & S1     Physical Therapy/Home Exercise: yes, per self    Imaging:   Lumbar MRI 6/16/15  Narrative   Technique: Routine lumbar spine MRI performed without contrast.    Comparison: MRI 06/16/2015, CT 06/15/2015.    Findings:    There are postsurgical changes consistent with L5-S1 posterior instrumented fusion with bilateral pedicular screws, vertical stabilizing rods and an intervertebral disk spacer.  When compared to the MRI dated 06/16/2015 00:33, there are new postsurgical   changes consistent with left L5 hemilaminectomy.  There is a 2.5 cm ill-defined fluid collection at the site of hemilaminectomy that is not well evaluated due to the lack of IV contrast but appears to extend anteriorly into the spinal canal and into the   left foraminal zone.  There is as possible small fluid collection within the anterior right epidural space  that may also due to compression of the adjacent spinal canal.  There is stable grade I anterolistheses of L5 on S1 with persistent high signal   intensity adjacent to the left lateral aspect of the disk spacer that demonstrates moderate associated bone marrow edema of the adjacent vertebral endplates, findings that are when compared to the previous exam.  Note is made that there is an apparent   high signal intensity within the nerve roots posterior to the L5-S1 level that was not definitely present on the previous exam.    There is mild persistent height loss loss involving the L5 vertebral body, likely degenerative in nature.  Remaining vertebral body heights are well-maintained without findings to suggest acute fracture.    There is mild intervertebral disk spacing and desiccation at L4-L5 with a small circumferential disk bulge. No disk protrusion or extrusion. There is moderate bilateral facet arthropathy and mild bilateral uncomfortable and buckling at this level.  These   findings contribute to mild spinal canal stenosis and mild bilateral foraminal narrowing.    Noting aforementioned postsurgical changes, there is persistent severe bilateral foraminal narrowing that is difficult to accurately characterize due to adjacent artifact.    There is edema anterior to the L4, L5 and S1 vertebral bodies, presumably postsurgical in nature.  No drainable fluid collections identified. Visualized retroperitoneal organs are unremarkable.   Impression       Postsurgical changes of L5-S1 posterior spinal fusion and left L5 hemilaminectomy. There is an ill-defined fluid collection at the site of hemilaminectomy that is not well evaluated due to the lack of IV contrast but appears to extend anteriorly with   suspected resulting mass effect on the spinal canal and the left foraminal zone.  There is a suspected small fluid collection within the anterior right epidural space that may contribute to additional mass effect on the  adjacent spinal canal. While   findings may represent sequela of expected postsurgical changes, continued follow-up is advised to ensure improvement and/or resolution.    Persistent abnormal high signal intensity adjacent to the left lateral aspect of the intervertebral disk spacer with moderate associated bone marrow edema of the adjacent vertebral endplates. Findings are similar when compared to the previous exam could   represent postsurgical changes. Early infection could have a similar appearance and is possible. Continued follow-up is advised.    Apparent high signal intensity within the nerve roots posterior to the L5-S1 level that was not definitely present on the previous exam. Findings may be artifactual in nature however, sequela of nerve root inflammation/edema is possible noting recent   surgery.    This report has been flagged in the Epic medical record     .  CMP  Sodium   Date Value Ref Range Status   07/10/2019 136 136 - 145 mmol/L Final     Potassium   Date Value Ref Range Status   07/10/2019 3.9 3.5 - 5.1 mmol/L Final     Chloride   Date Value Ref Range Status   07/10/2019 102 95 - 110 mmol/L Final     CO2   Date Value Ref Range Status   07/10/2019 23 23 - 29 mmol/L Final     Glucose   Date Value Ref Range Status   07/10/2019 151 (H) 70 - 110 mg/dL Final     BUN, Bld   Date Value Ref Range Status   07/10/2019 25 (H) 8 - 23 mg/dL Final     Creatinine   Date Value Ref Range Status   07/10/2019 1.5 (H) 0.5 - 1.4 mg/dL Final     Calcium   Date Value Ref Range Status   07/10/2019 9.5 8.7 - 10.5 mg/dL Final     Total Protein   Date Value Ref Range Status   07/10/2019 7.7 6.0 - 8.4 g/dL Final     Albumin   Date Value Ref Range Status   07/10/2019 3.9 3.5 - 5.2 g/dL Final   07/25/2018 4.5 3.6 - 5.1 g/dL Final     Comment:     @ Test Performed By:  [facility]  Marquis Wright M.D., Ph.D.,   [address] [place] [address]  [facility]  [id number]       Total  "Bilirubin   Date Value Ref Range Status   07/10/2019 0.5 0.1 - 1.0 mg/dL Final     Comment:     For infants and newborns, interpretation of results should be based  on gestational age, weight and in agreement with clinical  observations.  Premature Infant recommended reference ranges:  Up to 24 hours.............<8.0 mg/dL  Up to 48 hours............<12.0 mg/dL  3-5 days..................<15.0 mg/dL  6-29 days.................<15.0 mg/dL       Alkaline Phosphatase   Date Value Ref Range Status   07/10/2019 73 55 - 135 U/L Final     AST   Date Value Ref Range Status   07/10/2019 51 (H) 10 - 40 U/L Final     ALT   Date Value Ref Range Status   07/10/2019 41 10 - 44 U/L Final     Anion Gap   Date Value Ref Range Status   07/10/2019 11 8 - 16 mmol/L Final     eGFR if    Date Value Ref Range Status   07/10/2019 57 (A) >60 mL/min/1.73 m^2 Final     eGFR if non    Date Value Ref Range Status   07/10/2019 50 (A) >60 mL/min/1.73 m^2 Final     Comment:     Calculation used to obtain the estimated glomerular filtration  rate (eGFR) is the CKD-EPI equation.            Allergies:   Review of patient's allergies indicates:   Allergen Reactions    Naproxen      Other reaction(s): problems w/liver/kid    Oxaprozin      Other reaction(s): cause problems w/kid/liver       Current Medications:   Current Outpatient Medications   Medication Sig Dispense Refill    allopurinol (ZYLOPRIM) 100 MG tablet TAKE 1 TABLET BY MOUTH TWICE A DAY 60 tablet 7    amitriptyline (ELAVIL) 50 MG tablet Take 1 tablet (50 mg total) by mouth every evening. For nerve pain and sleep 30 tablet 11    BD ULTRA-FINE MENDY PEN NEEDLES 32 gauge x 5/32" Ndle       blood sugar diagnostic Strp 1 strip by Misc.(Non-Drug; Combo Route) route 3 (three) times daily. 100 strip 11    calcium carbonate-vitamin D3 (CALTRATE 600 + D) 600 mg(1,500mg) -400 unit Chew       colchicine 0.6 mg tablet Take 1 tablet (0.6 mg total) by mouth 2 (two) " "times daily. 30 tablet 2    dulaglutide (TRULICITY) 0.75 mg/0.5 mL PnIj Inject 0.5 mLs (0.75 mg total) into the skin every 7 days. 2 mL 11    gabapentin (NEURONTIN) 800 MG tablet Take 1 tablet (800 mg total) by mouth 3 (three) times daily. 90 tablet 11    insulin aspart U-100 (NOVOLOG FLEXPEN U-100 INSULIN) 100 unit/mL (3 mL) InPn pen Inject 20 Units into the skin 3 (three) times daily with meals. 15 mL 11    insulin detemir U-100 (LEVEMIR FLEXTOUCH) 100 unit/mL (3 mL) SubQ InPn pen Inject 70 Units into the skin every evening. 15 mL 11    lancets Misc 1 each by Misc.(Non-Drug; Combo Route) route 4 (four) times daily before meals and nightly. 200 each 11    levothyroxine (SYNTHROID) 88 MCG tablet TAKE 1 TABLET (88 MCG TOTAL) BY MOUTH ONCE DAILY. 90 tablet 4    lisinopril (PRINIVIL,ZESTRIL) 40 MG tablet Take 1 tablet (40 mg total) by mouth once daily. 90 tablet 3    metoprolol succinate (TOPROL-XL) 200 MG 24 hr tablet TAKE 1 TABLET (200 MG TOTAL) BY MOUTH ONCE DAILY. 30 tablet 11    multivitamin (THERAGRAN) per tablet Take 1 tablet by mouth.      NIFEdipine (ADALAT CC) 60 MG TbSR TAKE 1 TABLET (60 MG TOTAL) BY MOUTH ONCE DAILY. 90 tablet 1    NOVOFINE PLUS 32 gauge x 1/6" Ndle       ondansetron (ZOFRAN-ODT) 4 MG TbDL Take 1 tablet (4 mg total) by mouth every 8 (eight) hours as needed (n/v). 10 tablet 0    oxyCODONE (ROXICODONE) 15 MG Tab   0    pantoprazole (PROTONIX) 20 MG tablet Take 1 tablet (20 mg total) by mouth once daily. 30 tablet 0    pen needle, diabetic (BD ULTRA-FINE MENDY PEN NEEDLE) 32 gauge x 5/32" Ndle TEST WITH NOVOLOG THREE TIMES A DAY WITH MEALS AND WITH LEVEMIR AT BEDTIME 100 each 11    sertraline (ZOLOFT) 100 MG tablet TAKE 1 TABLET (100 MG TOTAL) BY MOUTH ONCE DAILY. 30 tablet 7    sildenafil (REVATIO) 20 mg Tab Take 5 by mouth 1 hour before sexual activity 25 tablet 11    tacrolimus (PROGRAF) 1 MG Cap TAKE 2 CAPSULES (2 MG TOTAL) BY MOUTH IN THE MORNING & TAKE 1 CAPSULE (1 MG " TOTAL) IN THE EVENING 90 capsule 11    TRUE METRIX GLUCOSE METER Misc TEST 4 (FOUR) TIMES DAILY BEFORE MEALS AND NIGHTLY. 1 each 0     No current facility-administered medications for this visit.        REVIEW OF SYSTEMS:    GENERAL:  No weight loss, malaise or fevers.  HEENT:  Negative for frequent or significant headaches.  NECK:  Negative for lumps, goiter, pain and significant neck swelling.  RESPIRATORY:  Negative for cough, wheezing or shortness of breath.  CARDIOVASCULAR:  Negative for chest pain, leg swelling or palpitations. H/O hypertension.  GI:  Negative for abdominal discomfort, blood in stools or black stools or change in bowel habits.  MUSCULOSKELETAL:  See HPI.  SKIN:  Negative for lesions, rash, and itching.  PSYCH:  Negative for sleep disturbance, mood disorder and recent psychosocial stressors.  HEMATOLOGY/LYMPHOLOGY:  Negative for prolonged bleeding, bruising easily or swollen nodes. H/O liver transplant.  NEURO:   No history of headaches, syncope, paralysis, seizures or tremors.  ENDO: Diabetes.   All other reviewed and negative other than HPI.    Past Medical History:  Past Medical History:   Diagnosis Date    Anemia     Anxiety     Chronic pain syndrome 7/13/2011    CKD (chronic kidney disease), stage III     Diabetes mellitus type II, uncontrolled     Discitis of lumbosacral region 1/16/2015    ED (erectile dysfunction)     Genital herpes     Gout, arthritis     History of alcohol abuse     History of hepatitis C, s/p successful Rx w/ SVR24 (cure) - 5/2018 8/23/2011    Completed 24wks Epclusa + RBV w/SVR12 - 2/2018  -     History of positive PPD, treatment status unknown     Pulmonary granulomas, negative sputum cultures for AFB and indeterminate quantferon test    History of substance abuse     Hypertension     Hypothyroidism     Liver replaced by transplant 8/23/2011    DATE: 12/16/2013  LIVER BIOPSY : REASON:  hep C staging  PATHOLOGY COMMITTEE NOTE/PLAN: :grade  1 / stage  1        Pancreatitis 2016    Peptic ulcer disease        Past Surgical History:  Past Surgical History:   Procedure Laterality Date    BIOPSY, LIVER N/A 12/16/2013    Performed by [facility] OR 2ND FLR    BIOPSY, LIVER N/A 12/10/2012    Performed by [facility] OR 2ND FLR    CHOLECYSTECTOMY      COLONOSCOPY N/A 6/5/2015    Per[facility] by Sund[facility] ENDO (4TH FLR)    ALCIDES-TRANSFORAMINAL Right 1/15/2015    Performed by Thu Penn [facility] at [facility] PAIN MGT    LAMINECTOMY-LUMBAR-DECOMPRESSION L5- S1 exploration of TLIF N/A 6/16/2015    [facility]    LIVER TRANSPLANT  06/2010    MINIMALLY INVASIVE FUSION-TRANSLUMINAL LUMBAR INTERBODY (TLIF) Right 6/15/2015    Performed by [facility] OR 2ND FLR    SPINE SURGERY         Family History:  Family History   Problem Relation Age of Onset    Cancer Mother     Diabetes Mother     Heart disease Mother     Diabetes Sister     Cancer Maternal Uncle 82        colon CA    Melanoma Neg Hx     Psoriasis Neg Hx     Lupus Neg Hx     Eczema Neg Hx     Acne Neg Hx        Social History:  Social History     Socioeconomic History    Marital status:      Spouse name: Not on file    Number of children: Not on file    Years of education: Not on file    Highest education level: Not on file   Occupational History    Not on file   Social Needs    Financial resource strain: Not on file    Food insecurity:     Worry: Not on file     Inability: Not on file    Transportation needs:     Medical: Not on file     Non-medical: Not on file   Tobacco Use    Smoking status: Former Smoker    Smokeless tobacco: Never Used   Substance and Sexual Activity    Alcohol use: No     Comment: over 5 years ago, none currently    Drug use: No    Sexual activity: Not on file   Lifestyle    Physical activity:     Days per week: Not on file     Minutes per session: Not on  "file    Stress: Not on file   Relationships    Social connections:     Talks on phone: Not on file     Gets together: Not on file     Attends Gnosticism service: Not on file     Active member of club or organization: Not on file     Attends meetings of clubs or organizations: Not on file     Relationship status: Not on file   Other Topics Concern    Not on file   Social History Narrative    Not on file       OBJECTIVE:    /83   Pulse 90   Temp 98.3 °F (36.8 °C)   Resp 18   Ht 6' 1" (1.854 m)   Wt 126.2 kg (278 lb 3.5 oz)   BMI 36.71 kg/m²     PHYSICAL EXAMINATION:    General appearance: Well appearing, in no acute distress, alert and oriented x3.  Psych:  Mood and affect appropriate.  Skin: Skin color, texture, turgor normal, no rashes or lesions, in both upper and lower body.  Head/face:  Atraumatic, normocephalic. No palpable lymph nodes.  GI: Abdomen soft. TTP over right upper quadrant along scar.   Back: Straight leg raising in the sitting position is negative to radicular pain. There is pain with palpation over lumbar spine. Limited ROM with pain on extension. Positive facet loading bilaterally.   Extremities: Peripheral joint ROM is full and pain free without obvious instability or laxity in all four extremities. There is pain with palpation over bilateral SI joints. FABERs is negative bilaterally. No deformities or skin discoloration. Good capillary refill.   Musculoskeletal:  Right plantar flexion 4/5.  All other LE strength 5/5 and symmetric.  No atrophy or tone abnormalities are noted.  Neuro: Bilateral upper and lower extremity coordination and muscle stretch reflexes are physiologic and symmetric.  Plantar response are downgoing.  Decreased sensation to anterior aspect of right foot.   Gait: Antalgic- ambulates with cane.    ASSESSMENT: 63 y.o. year old male with lower back and right leg pain, consistent with the following diagnoses:     1. Postlaminectomy syndrome of lumbar region  " oxyCODONE (ROXICODONE) 15 MG Tab    oxyCODONE (ROXICODONE) 15 MG Tab    oxyCODONE (ROXICODONE) 15 MG Tab   2. S/P lumbar spinal fusion  oxyCODONE (ROXICODONE) 15 MG Tab    oxyCODONE (ROXICODONE) 15 MG Tab    oxyCODONE (ROXICODONE) 15 MG Tab   3. Chronic pain syndrome  oxyCODONE (ROXICODONE) 15 MG Tab    oxyCODONE (ROXICODONE) 15 MG Tab    oxyCODONE (ROXICODONE) 15 MG Tab   4. Lumbar radiculopathy  oxyCODONE (ROXICODONE) 15 MG Tab    oxyCODONE (ROXICODONE) 15 MG Tab    oxyCODONE (ROXICODONE) 15 MG Tab   5. Acquired spondylolisthesis  oxyCODONE (ROXICODONE) 15 MG Tab    oxyCODONE (ROXICODONE) 15 MG Tab    oxyCODONE (ROXICODONE) 15 MG Tab   6. DDD (degenerative disc disease), lumbar  oxyCODONE (ROXICODONE) 15 MG Tab    oxyCODONE (ROXICODONE) 15 MG Tab    oxyCODONE (ROXICODONE) 15 MG Tab   7. Encounter for monitoring opioid maintenance therapy  oxyCODONE (ROXICODONE) 15 MG Tab    oxyCODONE (ROXICODONE) 15 MG Tab    oxyCODONE (ROXICODONE) 15 MG Tab    Pain Clinic Drug Screen         PLAN:     - Previous imaging was reviewed and discussed with the patient today. Recent labs reviewed with patient today.     - Patient can continue Roxicodone 15 mg QID PRN pain, #120. 3 months of prescriptions provided today.     - The patient is here today for a refill of current pain medications and they believe these provide effective pain control and improvements in quality of life.  The patient notes no serious side effects, and feels the benefits outweigh the risks.  The patient was reminded of the pain contract that they signed previously as well as the risks and benefits of the medication including possible death.  The updated [place] Board of Pharmacy prescription monitoring program was reviewed, and the patient has been found to be compliant with current treatment plan. Medication management provided by Dr. Penn.     - Continue Gabapentin 800 mg TID.     - Pain Contract signed today.     - UDS from 8/22/2018 reviewed and  consistent. Patient unable to void today. Serum test done today.     - The patient will continue a home exercise routine to help with pain and strengthening.      - RTC in 3 months or sooner if needed.    - Counseled patient regarding the importance of constant sleeping habits and physical therapy.     - Dr. Penn was evaluated the patient and agrees with this plan.    The above plan and management options were discussed at length with patient. Patient is in agreement with the above and verbalized understanding.    Emma Batista NP  08/14/2019

## 2019-08-17 LAB
AMPHETAMINES SERPL QL: NEGATIVE
BARBITURATES SERPL QL SCN: NEGATIVE
BENZODIAZ SERPL QL SCN: NEGATIVE
BZE SERPL QL: NEGATIVE
CARBOXYTHC SERPL QL SCN: NEGATIVE
ETHANOL SERPL QL SCN: NEGATIVE
METHADONE SERPL QL SCN: NEGATIVE
OPIATES SERPL QL SCN: NEGATIVE
PCP SERPL QL SCN: NEGATIVE
PROPOXYPH SERPL QL: NEGATIVE

## 2019-08-18 RX ORDER — ALLOPURINOL 100 MG/1
TABLET ORAL
Qty: 60 TABLET | Refills: 7 | Status: SHIPPED | OUTPATIENT
Start: 2019-08-18 | End: 2020-08-14

## 2019-09-04 ENCOUNTER — TELEPHONE (OUTPATIENT)
Dept: TRANSPLANT | Facility: CLINIC | Age: 64
End: 2019-09-04

## 2019-09-06 ENCOUNTER — TELEPHONE (OUTPATIENT)
Dept: TRANSPLANT | Facility: CLINIC | Age: 64
End: 2019-09-06

## 2019-09-06 NOTE — LETTER
September 6, 2019    Vick Gonzalez  [address] [address]          Dear Vick Gonzalez:  MRN: [id number]    Your lab work was due to be drawn on 9/3/19.  We contacted your lab and were unable to get test results.  It is very important to get your labs drawn as scheduled.  We cannot monitor you for rejection, infections, or drug toxicity side effects without lab results.  Please call us at [phone] as soon as possible to let us know when you plan to have labs drawn.    Sincerely,    Kandy Herrera, RN, BSN, [facility]  Liver Transplant Coordinator  [facility]  [address] [place] [id number]  [phone]

## 2019-09-10 DIAGNOSIS — M43.10 ACQUIRED SPONDYLOLISTHESIS: ICD-10-CM

## 2019-09-10 DIAGNOSIS — Z51.81 ENCOUNTER FOR MONITORING OPIOID MAINTENANCE THERAPY: ICD-10-CM

## 2019-09-10 DIAGNOSIS — M96.1 POSTLAMINECTOMY SYNDROME OF LUMBAR REGION: ICD-10-CM

## 2019-09-10 DIAGNOSIS — M54.16 LUMBAR RADICULOPATHY: ICD-10-CM

## 2019-09-10 DIAGNOSIS — Z79.891 ENCOUNTER FOR MONITORING OPIOID MAINTENANCE THERAPY: ICD-10-CM

## 2019-09-10 DIAGNOSIS — M51.36 DDD (DEGENERATIVE DISC DISEASE), LUMBAR: ICD-10-CM

## 2019-09-10 DIAGNOSIS — Z98.1 S/P LUMBAR SPINAL FUSION: ICD-10-CM

## 2019-09-10 DIAGNOSIS — G89.4 CHRONIC PAIN SYNDROME: ICD-10-CM

## 2019-09-10 RX ORDER — OXYCODONE HYDROCHLORIDE 15 MG/1
15 TABLET ORAL EVERY 6 HOURS PRN
Qty: 120 TABLET | Refills: 0 | Status: SHIPPED | OUTPATIENT
Start: 2019-10-11 | End: 2019-10-08 | Stop reason: SDUPTHER

## 2019-09-10 RX ORDER — OXYCODONE HYDROCHLORIDE 15 MG/1
15 TABLET ORAL EVERY 6 HOURS PRN
Qty: 120 TABLET | Refills: 0 | Status: SHIPPED | OUTPATIENT
Start: 2019-09-13 | End: 2019-10-13

## 2019-09-10 NOTE — TELEPHONE ENCOUNTER
"Received incoming call from [facility]. Osito, stated " I came to the pharmacy to  my medication that was due on the 10 th and the pharmacy is saying they don't have anything on file."      was reviewed, patient last prescription was filled on 08/14//2019.   When patient came in for office visit, he was provided 3 month of prescriptions, however they didn't have the mandatory script on them so he brought them back to the office, Emma Batista NP sent in another refill but for only one month.  "

## 2019-09-10 NOTE — TELEPHONE ENCOUNTER
----- Message from Leonarda Goodwin sent at 9/10/2019  9:22 AM CDT -----  Contact: VIRGIL GR   Please refill the medication(s) listed below. The patient can be reached at this phone number once it is called into the pharmacy. [phone]      Can the clinic reply in [facility]: No      Medication #1oxyCODONE (ROXICODONE) 15 MG Tab  (Greater than 7 day supply, medically necessary.)      Medication #2      Preferred Pharmacy: [facility] - [address]

## 2019-09-25 ENCOUNTER — LAB VISIT (OUTPATIENT)
Dept: LAB | Facility: HOSPITAL | Age: 64
End: 2019-09-25
Attending: INTERNAL MEDICINE
Payer: MEDICARE

## 2019-09-25 DIAGNOSIS — Z94.4 LIVER TRANSPLANTED: ICD-10-CM

## 2019-09-25 LAB
ALBUMIN SERPL BCP-MCNC: 3.8 G/DL (ref 3.5–5.2)
ALP SERPL-CCNC: 74 U/L (ref 55–135)
ALT SERPL W/O P-5'-P-CCNC: 28 U/L (ref 10–44)
ANION GAP SERPL CALC-SCNC: 8 MMOL/L (ref 8–16)
AST SERPL-CCNC: 23 U/L (ref 10–40)
BASOPHILS # BLD AUTO: 0.04 K/UL (ref 0–0.2)
BASOPHILS NFR BLD: 0.4 % (ref 0–1.9)
BILIRUB SERPL-MCNC: 0.4 MG/DL (ref 0.1–1)
BUN SERPL-MCNC: 12 MG/DL (ref 8–23)
CALCIUM SERPL-MCNC: 9.4 MG/DL (ref 8.7–10.5)
CHLORIDE SERPL-SCNC: 106 MMOL/L (ref 95–110)
CO2 SERPL-SCNC: 25 MMOL/L (ref 23–29)
CREAT SERPL-MCNC: 1.2 MG/DL (ref 0.5–1.4)
DIFFERENTIAL METHOD: ABNORMAL
EOSINOPHIL # BLD AUTO: 0.6 K/UL (ref 0–0.5)
EOSINOPHIL NFR BLD: 6.5 % (ref 0–8)
ERYTHROCYTE [DISTWIDTH] IN BLOOD BY AUTOMATED COUNT: 14.1 % (ref 11.5–14.5)
EST. GFR  (AFRICAN AMERICAN): >60 ML/MIN/1.73 M^2
EST. GFR  (NON AFRICAN AMERICAN): >60 ML/MIN/1.73 M^2
GLUCOSE SERPL-MCNC: 118 MG/DL (ref 70–110)
HCT VFR BLD AUTO: 39.4 % (ref 40–54)
HGB BLD-MCNC: 12.3 G/DL (ref 14–18)
IMM GRANULOCYTES # BLD AUTO: 0.03 K/UL (ref 0–0.04)
IMM GRANULOCYTES NFR BLD AUTO: 0.3 % (ref 0–0.5)
LYMPHOCYTES # BLD AUTO: 3.6 K/UL (ref 1–4.8)
LYMPHOCYTES NFR BLD: 39.6 % (ref 18–48)
MCH RBC QN AUTO: 28.5 PG (ref 27–31)
MCHC RBC AUTO-ENTMCNC: 31.2 G/DL (ref 32–36)
MCV RBC AUTO: 91 FL (ref 82–98)
MONOCYTES # BLD AUTO: 0.7 K/UL (ref 0.3–1)
MONOCYTES NFR BLD: 7.5 % (ref 4–15)
NEUTROPHILS # BLD AUTO: 4.1 K/UL (ref 1.8–7.7)
NEUTROPHILS NFR BLD: 45.7 % (ref 38–73)
NRBC BLD-RTO: 0 /100 WBC
PLATELET # BLD AUTO: 155 K/UL (ref 150–350)
PMV BLD AUTO: 13 FL (ref 9.2–12.9)
POTASSIUM SERPL-SCNC: 4.5 MMOL/L (ref 3.5–5.1)
PROT SERPL-MCNC: 7.5 G/DL (ref 6–8.4)
RBC # BLD AUTO: 4.31 M/UL (ref 4.6–6.2)
SODIUM SERPL-SCNC: 139 MMOL/L (ref 136–145)
TACROLIMUS BLD-MCNC: 6 NG/ML (ref 5–15)
WBC # BLD AUTO: 9.08 K/UL (ref 3.9–12.7)

## 2019-09-25 PROCEDURE — 80197 ASSAY OF TACROLIMUS: CPT

## 2019-09-25 PROCEDURE — 36415 COLL VENOUS BLD VENIPUNCTURE: CPT | Mod: PO

## 2019-09-25 PROCEDURE — 80053 COMPREHEN METABOLIC PANEL: CPT

## 2019-09-25 PROCEDURE — 85025 COMPLETE CBC W/AUTO DIFF WBC: CPT

## 2019-09-26 ENCOUNTER — TELEPHONE (OUTPATIENT)
Dept: TRANSPLANT | Facility: CLINIC | Age: 64
End: 2019-09-26

## 2019-09-26 DIAGNOSIS — Z85.05 HISTORY OF LIVER CANCER: Primary | ICD-10-CM

## 2019-09-26 NOTE — LETTER
September 26, 2019    Vick Carlos  [address] [address]          Dear Vick Gonzalez:  MRN: [id number]    This is a follow up to your recent labs, your lab results were stable.  There are no medicine changes.  Please have your labs drawn again on 12/16/19.      If you cannot have your labs drawn on the scheduled date, it is your responsibility to call the transplant department to reschedule.  To reschedule or make an appointment, please as to speak to or leave a message for my assistant, Madison Pearce or Laura, at [phone].  When leaving a message for Madison Pearce Angela or myself, we ask that you leave a brief message regarding your request.    Sincerely,        Your Liver Transplant Coordinator    [facility]  [address] [place] [address] [id number] [phone]

## 2019-10-04 ENCOUNTER — LAB VISIT (OUTPATIENT)
Dept: LAB | Facility: HOSPITAL | Age: 64
End: 2019-10-04
Attending: FAMILY MEDICINE
Payer: MEDICARE

## 2019-10-04 DIAGNOSIS — I10 HYPERTENSION, UNSPECIFIED TYPE: ICD-10-CM

## 2019-10-04 DIAGNOSIS — E11.65 UNCONTROLLED TYPE 2 DIABETES MELLITUS WITH HYPERGLYCEMIA: ICD-10-CM

## 2019-10-04 DIAGNOSIS — N18.30 CKD (CHRONIC KIDNEY DISEASE), STAGE III: ICD-10-CM

## 2019-10-04 LAB
ALBUMIN SERPL BCP-MCNC: 3.9 G/DL (ref 3.5–5.2)
ALP SERPL-CCNC: 103 U/L (ref 55–135)
ALT SERPL W/O P-5'-P-CCNC: 30 U/L (ref 10–44)
ANION GAP SERPL CALC-SCNC: 9 MMOL/L (ref 8–16)
AST SERPL-CCNC: 20 U/L (ref 10–40)
BASOPHILS # BLD AUTO: 0.04 K/UL (ref 0–0.2)
BASOPHILS NFR BLD: 0.4 % (ref 0–1.9)
BILIRUB SERPL-MCNC: 0.3 MG/DL (ref 0.1–1)
BUN SERPL-MCNC: 20 MG/DL (ref 8–23)
CALCIUM SERPL-MCNC: 9.9 MG/DL (ref 8.7–10.5)
CHLORIDE SERPL-SCNC: 105 MMOL/L (ref 95–110)
CHOLEST SERPL-MCNC: 163 MG/DL (ref 120–199)
CHOLEST/HDLC SERPL: 5.4 {RATIO} (ref 2–5)
CO2 SERPL-SCNC: 23 MMOL/L (ref 23–29)
CREAT SERPL-MCNC: 1.4 MG/DL (ref 0.5–1.4)
DIFFERENTIAL METHOD: ABNORMAL
EOSINOPHIL # BLD AUTO: 0.6 K/UL (ref 0–0.5)
EOSINOPHIL NFR BLD: 6.9 % (ref 0–8)
ERYTHROCYTE [DISTWIDTH] IN BLOOD BY AUTOMATED COUNT: 14.2 % (ref 11.5–14.5)
EST. GFR  (AFRICAN AMERICAN): >60 ML/MIN/1.73 M^2
EST. GFR  (NON AFRICAN AMERICAN): 53.8 ML/MIN/1.73 M^2
ESTIMATED AVG GLUCOSE: 166 MG/DL (ref 68–131)
GLUCOSE SERPL-MCNC: 259 MG/DL (ref 70–110)
HBA1C MFR BLD HPLC: 7.4 % (ref 4–5.6)
HCT VFR BLD AUTO: 41.5 % (ref 40–54)
HDLC SERPL-MCNC: 30 MG/DL (ref 40–75)
HDLC SERPL: 18.4 % (ref 20–50)
HGB BLD-MCNC: 13.2 G/DL (ref 14–18)
IMM GRANULOCYTES # BLD AUTO: 0.02 K/UL (ref 0–0.04)
IMM GRANULOCYTES NFR BLD AUTO: 0.2 % (ref 0–0.5)
LDLC SERPL CALC-MCNC: ABNORMAL MG/DL (ref 63–159)
LYMPHOCYTES # BLD AUTO: 3.1 K/UL (ref 1–4.8)
LYMPHOCYTES NFR BLD: 33.9 % (ref 18–48)
MCH RBC QN AUTO: 29.1 PG (ref 27–31)
MCHC RBC AUTO-ENTMCNC: 31.8 G/DL (ref 32–36)
MCV RBC AUTO: 91 FL (ref 82–98)
MONOCYTES # BLD AUTO: 0.7 K/UL (ref 0.3–1)
MONOCYTES NFR BLD: 7.5 % (ref 4–15)
NEUTROPHILS # BLD AUTO: 4.6 K/UL (ref 1.8–7.7)
NEUTROPHILS NFR BLD: 51.1 % (ref 38–73)
NONHDLC SERPL-MCNC: 133 MG/DL
NRBC BLD-RTO: 0 /100 WBC
PLATELET # BLD AUTO: 169 K/UL (ref 150–350)
PMV BLD AUTO: 13.5 FL (ref 9.2–12.9)
POTASSIUM SERPL-SCNC: 4.4 MMOL/L (ref 3.5–5.1)
PROT SERPL-MCNC: 8 G/DL (ref 6–8.4)
RBC # BLD AUTO: 4.54 M/UL (ref 4.6–6.2)
SODIUM SERPL-SCNC: 137 MMOL/L (ref 136–145)
TRIGL SERPL-MCNC: 403 MG/DL (ref 30–150)
WBC # BLD AUTO: 9.01 K/UL (ref 3.9–12.7)

## 2019-10-04 PROCEDURE — 80053 COMPREHEN METABOLIC PANEL: CPT

## 2019-10-04 PROCEDURE — 80061 LIPID PANEL: CPT

## 2019-10-04 PROCEDURE — 83036 HEMOGLOBIN GLYCOSYLATED A1C: CPT

## 2019-10-04 PROCEDURE — 36415 COLL VENOUS BLD VENIPUNCTURE: CPT | Mod: PO

## 2019-10-04 PROCEDURE — 85025 COMPLETE CBC W/AUTO DIFF WBC: CPT

## 2019-10-07 DIAGNOSIS — Z94.4 LIVER REPLACED BY TRANSPLANT: ICD-10-CM

## 2019-10-07 RX ORDER — NIFEDIPINE 60 MG/1
60 TABLET, EXTENDED RELEASE ORAL DAILY
Qty: 90 TABLET | Refills: 1 | Status: SHIPPED | OUTPATIENT
Start: 2019-10-07 | End: 2020-04-06

## 2019-10-07 RX ORDER — TACROLIMUS 1 MG/1
CAPSULE ORAL
Qty: 90 CAPSULE | Refills: 11 | Status: SHIPPED | OUTPATIENT
Start: 2019-10-07 | End: 2020-10-23 | Stop reason: SDUPTHER

## 2019-10-08 ENCOUNTER — OFFICE VISIT (OUTPATIENT)
Dept: FAMILY MEDICINE | Facility: CLINIC | Age: 64
End: 2019-10-08
Payer: MEDICARE

## 2019-10-08 VITALS
SYSTOLIC BLOOD PRESSURE: 120 MMHG | TEMPERATURE: 98 F | WEIGHT: 279.75 LBS | OXYGEN SATURATION: 99 % | HEIGHT: 73 IN | BODY MASS INDEX: 37.08 KG/M2 | HEART RATE: 89 BPM | DIASTOLIC BLOOD PRESSURE: 78 MMHG

## 2019-10-08 DIAGNOSIS — I10 HYPERTENSION, UNSPECIFIED TYPE: ICD-10-CM

## 2019-10-08 DIAGNOSIS — N18.30 CKD (CHRONIC KIDNEY DISEASE), STAGE III: ICD-10-CM

## 2019-10-08 DIAGNOSIS — D84.9 IMMUNOSUPPRESSION: ICD-10-CM

## 2019-10-08 DIAGNOSIS — E78.1 HYPERTRIGLYCERIDEMIA: ICD-10-CM

## 2019-10-08 DIAGNOSIS — E03.9 HYPOTHYROIDISM, UNSPECIFIED TYPE: ICD-10-CM

## 2019-10-08 DIAGNOSIS — Z12.5 SCREENING FOR MALIGNANT NEOPLASM OF PROSTATE: ICD-10-CM

## 2019-10-08 DIAGNOSIS — E11.65 UNCONTROLLED TYPE 2 DIABETES MELLITUS WITH HYPERGLYCEMIA: Primary | ICD-10-CM

## 2019-10-08 DIAGNOSIS — Z94.4 LIVER TRANSPLANT RECIPIENT: ICD-10-CM

## 2019-10-08 PROCEDURE — 2024F 7 FLD RTA PHOTO EVC RTNOPTHY: CPT | Mod: ,,, | Performed by: FAMILY MEDICINE

## 2019-10-08 PROCEDURE — 99999 PR PBB SHADOW E&M-EST. PATIENT-LVL V: ICD-10-PCS | Mod: PBBFAC,,, | Performed by: FAMILY MEDICINE

## 2019-10-08 PROCEDURE — 2024F PR 7 FIELD PHOTOS WITH INTERP/ REVIEW: ICD-10-PCS | Mod: ,,, | Performed by: FAMILY MEDICINE

## 2019-10-08 PROCEDURE — 99215 OFFICE O/P EST HI 40 MIN: CPT | Mod: 25,S$PBB,, | Performed by: FAMILY MEDICINE

## 2019-10-08 PROCEDURE — 99215 PR OFFICE/OUTPT VISIT, EST, LEVL V, 40-54 MIN: ICD-10-PCS | Mod: 25,S$PBB,, | Performed by: FAMILY MEDICINE

## 2019-10-08 PROCEDURE — 99215 OFFICE O/P EST HI 40 MIN: CPT | Mod: PBBFAC,PO | Performed by: FAMILY MEDICINE

## 2019-10-08 PROCEDURE — 90686 IIV4 VACC NO PRSV 0.5 ML IM: CPT | Mod: PBBFAC,PO

## 2019-10-08 PROCEDURE — 99999 PR PBB SHADOW E&M-EST. PATIENT-LVL V: CPT | Mod: PBBFAC,,, | Performed by: FAMILY MEDICINE

## 2019-10-08 RX ORDER — ROSUVASTATIN CALCIUM 5 MG/1
5 TABLET, COATED ORAL DAILY
Qty: 90 TABLET | Refills: 3 | Status: SHIPPED | OUTPATIENT
Start: 2019-10-08 | End: 2020-09-27 | Stop reason: SDUPTHER

## 2019-10-08 NOTE — PROGRESS NOTES
(Portions of this note were dictated using voice recognition software and may contain dictation related errors in spelling/grammar/syntax not found on text review)    CC:   Chief Complaint   Patient presents with    Follow-up       HPI: 63 y.o. male      Diabetes:  On Levemir 70 units daily, NovoLog 20 units with meals.  Trulicity added at 0.75 mg weekly.  A1c has come down as below  Eye exam:  Retinal camera done February 2019.  Inadequate imaging of right eye, requires eye exam  Does get neuropathy symptoms that we discussed likely is on count of his diabetes  Hypertension on lisinopril 40 mg daily, metoprolol 200 mg daily, nifedipine 60 mg daily.     Has not traditionally been on a statin because of his liver transplant status.  Was on Zetia but not currently on.  Labs show significant triglyceridemia    Hypertension on metoprolol 200 mg daily, nifedipine 60 mg daily, lisinopril 40 mg daily    Hypothyroidism:  On Synthroid 88 mcg daily    Anxiety on Zoloft 100 mg daily    Amitriptyline at nighttime to help with sleep.  Was increased to 50 mg at last visit    Liver Transplant secondary to end-stage liver disease hepatitis C and prior alcohol abuse.:  Followed by hepatology.  On Prograf.  Reviewed recent transplant note from 08/12/2019.  Doing well.  Advised on weight loss and dietary restriction, improving diabetes control.  No changes to immunosuppressive therapy recommended.  Recent fiber scan showed stage II fibrosis    Chronic pain, followed by pain management.  On oxycodone, gabapentin.  Reviewed last pain management note on 08/14/2019.  Advised on continuing current meds and physical exercise (tries to walk with a cane).  No changes to therapy recommended.  UDS on file through pain management    CKD stage 3:  Renal function has been stable as below        Past Medical History:   Diagnosis Date    Anemia     Anxiety     Chronic pain syndrome 7/13/2011    CKD (chronic kidney disease), stage III      Diabetes mellitus type II, uncontrolled     Discitis of lumbosacral region 1/16/2015    ED (erectile dysfunction)     Genital herpes     Gout, arthritis     History of alcohol abuse     History of hepatitis C, s/p successful Rx w/ SVR24 (cure) - 5/2018 8/23/2011    Completed 24wks Epclusa + RBV w/SVR12 - 2/2018  -     History of positive PPD, treatment status unknown     Pulmonary granulomas, negative sputum cultures for AFB and indeterminate quantferon test    History of substance abuse     Hypertension     Hypothyroidism     Liver replaced by transplant 8/23/2011    DATE: 12/16/2013  LIVER BIOPSY : REASON:  hep C staging  PATHOLOGY COMMITTEE NOTE/PLAN: :grade  1 / stage 1        Pancreatitis 2016    Peptic ulcer disease        Past Surgical History:   Procedure Laterality Date    CHOLECYSTECTOMY      LIVER TRANSPLANT  06/2010    SPINE SURGERY         Family History   Problem Relation Age of Onset    Cancer Mother     Diabetes Mother     Heart disease Mother     Diabetes Sister     Cancer Maternal Uncle 82        colon CA    Melanoma Neg Hx     Psoriasis Neg Hx     Lupus Neg Hx     Eczema Neg Hx     Acne Neg Hx        Social History     Tobacco Use    Smoking status: Former Smoker    Smokeless tobacco: Never Used   Substance Use Topics    Alcohol use: No     Comment: over 5 years ago, none currently    Drug use: No       Lab Results   Component Value Date    WBC 9.01 10/04/2019    HGB 13.2 (L) 10/04/2019    HCT 41.5 10/04/2019    MCV 91 10/04/2019     10/04/2019    CHOL 163 10/04/2019    TRIG 403 (H) 10/04/2019    HDL 30 (L) 10/04/2019    ALT 30 10/04/2019    AST 20 10/04/2019    BILITOT 0.3 10/04/2019    ALKPHOS 103 10/04/2019     10/04/2019    K 4.4 10/04/2019     10/04/2019    CREATININE 1.4 10/04/2019    ESTGFRAFRICA >60.0 10/04/2019    EGFRNONAA 53.8 (A) 10/04/2019    CALCIUM 9.9 10/04/2019    ALBUMIN 3.9 10/04/2019    BUN 20 10/04/2019    CO2 23 10/04/2019     TSH 1.924 07/25/2018    PSA 0.30 09/06/2016    PSADIAG 0.28 10/09/2017    INR 1.0 07/10/2019    HGBA1C 7.4 (H) 10/04/2019    MICALBCREAT 186.1 (H) 03/27/2017    LDLCALC Invalid, Trig>400.0 10/04/2019     (H) 10/04/2019         Hemoglobin A1C (%)   Date Value   10/04/2019 7.4 (H)   06/03/2019 9.2 (H)   02/25/2019 9.9 (H)   03/28/2018 8.3 (H)   10/02/2017 7.2 (H)   02/21/2017 9.0 (H)   10/04/2016 13.9 (H)   09/06/2016 8.3 (H)   01/19/2016 5.7   10/25/2010 5.6   07/15/2010 6.7 (H)   06/09/2010 <4.0   05/30/2010 <4.0   01/18/2008 <3.4 (L)             ROS:  GENERAL:  Has lost some weight.  No fever  SKIN: No rashes, no itching.  HEAD: No headaches.  EYES: No visual changes  EARS: No ear pain or changes in hearing.  NOSE: No congestion or rhinorrhea.  MOUTH & THROAT: No hoarseness, change in voice, or sore throat.  NODES: Denies swollen glands.  CHEST: Denies NARAYANAN, cyanosis, wheezing, cough and sputum production.  CARDIOVASCULAR: Denies chest pain, PND, orthopnea.  ABDOMEN: No nausea, vomiting, or changes in bowel function.  URINARY: No flank pain, dysuria or hematuria.  PERIPHERAL VASCULAR: No claudication or cyanosis.  MUSCULOSKELETAL: back pain.  NEUROLOGIC: numbness feet    Vital signs reviewed  PE:   APPEARANCE: Well nourished, well developed, in no acute distress.    HEAD: Normocephalic, atraumatic.  EYES: PERRL. EOMI.   Conjunctivae noninjected.  EARS: TM's intact. Light reflex normal. No retraction or perforation  NOSE: Mucosa pink. Airway clear.  MOUTH & THROAT: No tonsillar enlargement. No pharyngeal erythema or exudate.   NECK: Supple with no cervical lymphadenopathy.  No carotid bruits.  No thyromegaly  CHEST: Good inspiratory effort. Lungs clear to auscultation with no wheezes or crackles.  CARDIOVASCULAR: Normal S1, S2. No rubs, murmurs, or gallops.  ABDOMEN: Bowel sounds normal. Not distended. Soft. No tenderness or masses. No organomegaly.  EXTREMITIES: No edema, cyanosis, or clubbing.  DIABETIC FOOT  EXAM: Protective Sensation (w/ 10 gram monofilament):  Right: Decreased  Left: Decreased    Visual Inspection:  Normal -  Bilateral and Onychomycosis -  Bilateral    Pedal Pulses:   Right: Diminshed  Left: Diminshed    Posterior tibialis:   Right:Diminshed  Left: Diminshed          IMPRESSION  1. Uncontrolled type 2 diabetes mellitus with hyperglycemia    2. CKD (chronic kidney disease), stage III    3. Hypertension, unspecified type    4. Screening for malignant neoplasm of prostate    5. Hypothyroidism, unspecified type    6. Immunosuppression    7. Liver transplant recipient    8. Hypertriglyceridemia            PLAN  Reviewed recent medical documentation from consultants as above.  Reviewed his health screenings and labs as noted      Diabetes:  A1c is improving.  Can increase Trulicity to 1.5 mg weekly.  Continue Levemir 70 q.day/NovoLog 20 q.a.c.    Hypertension: controlled.     Hyperlipidemia:  Given aortic atherosclerosis and uncontrolled diabetes and high cardiovascular risk, would consider statin therapy.  Was on Zetia in the past but not currently on.  Will start Crestor 5 mg daily, recheck in 3 months    Hypothyroidism:  Continue Synthroid.  Check labs prior to next visit    Continue transplant follow-up    Continue pain management follow-up    labs below in three months with visit to follow  Orders Placed This Encounter   Procedures    Influenza - Quadrivalent (PF)    CBC auto differential    Comprehensive metabolic panel    Hemoglobin A1c    TSH    PSA, Screening    Ambulatory referral to Optometry          SCREENINGS   Colonoscopy 2015, return in 10 years   prostate testing 2016/8     Immunizations   Pneumovax 2019  PCV 10/29/2018   Flu: today    Hepatitis B series up-to-date   Patient states he had tetanus shot around 2015

## 2019-10-29 ENCOUNTER — OFFICE VISIT (OUTPATIENT)
Dept: OPTOMETRY | Facility: CLINIC | Age: 64
End: 2019-10-29
Payer: MEDICARE

## 2019-10-29 DIAGNOSIS — E11.65 UNCONTROLLED TYPE 2 DIABETES MELLITUS WITH HYPERGLYCEMIA: ICD-10-CM

## 2019-10-29 DIAGNOSIS — H52.4 PRESBYOPIA: ICD-10-CM

## 2019-10-29 DIAGNOSIS — E11.9 TYPE 2 DIABETES MELLITUS WITHOUT RETINOPATHY: Primary | ICD-10-CM

## 2019-10-29 DIAGNOSIS — H25.13 NUCLEAR SCLEROSIS, BILATERAL: ICD-10-CM

## 2019-10-29 DIAGNOSIS — N18.30 CKD (CHRONIC KIDNEY DISEASE), STAGE III: ICD-10-CM

## 2019-10-29 DIAGNOSIS — I10 HYPERTENSION, UNSPECIFIED TYPE: ICD-10-CM

## 2019-10-29 DIAGNOSIS — Z13.5 GLAUCOMA SCREENING: ICD-10-CM

## 2019-10-29 PROCEDURE — 99999 PR PBB SHADOW E&M-EST. PATIENT-LVL II: ICD-10-PCS | Mod: PBBFAC,,, | Performed by: OPTOMETRIST

## 2019-10-29 PROCEDURE — 99212 OFFICE O/P EST SF 10 MIN: CPT | Mod: PBBFAC,PO | Performed by: OPTOMETRIST

## 2019-10-29 PROCEDURE — 92015 PR REFRACTION: ICD-10-PCS | Mod: ,,, | Performed by: OPTOMETRIST

## 2019-10-29 PROCEDURE — 92014 COMPRE OPH EXAM EST PT 1/>: CPT | Mod: S$PBB,,, | Performed by: OPTOMETRIST

## 2019-10-29 PROCEDURE — 92014 PR EYE EXAM, EST PATIENT,COMPREHESV: ICD-10-PCS | Mod: S$PBB,,, | Performed by: OPTOMETRIST

## 2019-10-29 PROCEDURE — 99999 PR PBB SHADOW E&M-EST. PATIENT-LVL II: CPT | Mod: PBBFAC,,, | Performed by: OPTOMETRIST

## 2019-10-29 PROCEDURE — 92015 DETERMINE REFRACTIVE STATE: CPT | Mod: ,,, | Performed by: OPTOMETRIST

## 2019-10-29 NOTE — PROGRESS NOTES
HPI     DLS  01/11/2018  HX:  Type 2 DM   this am.  Pt here for Annual Diabetic Eye Exam.  Pt states distance vision blurry since last visit, with rare   flashes/floaters.  Pt deny HA-  Photophobia+  Diplopia-  Glare-  Pain-    Hemoglobin A1C       Date                     Value               Ref Range             Status                10/04/2019               7.4 (H)             4.0 - 5.6 %           Final                5.7-6.4%          06/03/2019               9.2 (H)             4.0 - 5.6 %           Final                5.7-6.4%            02/25/2019               9.9 (H)             4.0 - 5.6 %           Final                5.7-6.4%  Consistent with prediabtes    ----------        Last edited by Sundar Brooks, OD on 10/29/2019  3:09 PM. (History)        ROS     Positive for: Endocrine (DM)    Negative for: Constitutional, Gastrointestinal, Neurological, Skin,   Genitourinary, Musculoskeletal, HENT, Cardiovascular, Eyes, Respiratory,   Psychiatric, Allergic/Imm, Heme/Lymph    Last edited by Sundar Brooks, OD on 10/29/2019  3:13 PM. (History)        Assessment /Plan     For exam results, see Encounter Report.    Type 2 diabetes mellitus without retinopathy    Uncontrolled type 2 diabetes mellitus with hyperglycemia  -     Ambulatory referral to Optometry    CKD (chronic kidney disease), stage III  -     Ambulatory referral to Optometry    Hypertension, unspecified type  -     Ambulatory referral to Optometry    Nuclear sclerosis, bilateral    Glaucoma screening    Presbyopia      1. Cat OU--pt happy w otc readers  2. DM/HTN- WITHOUT RETINOPATHY.  Advised yearly DFE    PLAN:    rtc 1 yr

## 2019-10-29 NOTE — LETTER
October 30, 2019      Emanuel Lopez MD  [address]  [address]  [place] [address]           [place] [facility]  [address]  [place] [address]  Phone: [phone]  Fax: [phone]          Patient: Vick Gonzalez   MR Number: [id number]   YOB: 1955   Date of Visit: 10/29/2019       Dear Dr. Emanuel Lopez:    Thank you for referring Vick Gonzalez to me for evaluation. Attached you will find relevant portions of my assessment and plan of care.    If you have questions, please do not hesitate to call me. I look forward to following Vick Gonzalez along with you.    Sincerely,    Sundar Brooks, OD    Enclosure  CC:  No Recipients    If you would like to receive this communication electronically, please contact [email or web address] or [phone] to request more information on [facility] Link access.    For providers and/or their staff who would like to refer a patient to [facility], please contact us through our one-stop-shop provider referral line, [facility], at [phone].    If you feel you have received this communication in error or would no longer like to receive these types of communications, please e-mail [email or web address]

## 2019-11-05 ENCOUNTER — OFFICE VISIT (OUTPATIENT)
Dept: PAIN MEDICINE | Facility: CLINIC | Age: 64
End: 2019-11-05
Payer: MEDICARE

## 2019-11-05 VITALS
WEIGHT: 271 LBS | HEART RATE: 103 BPM | BODY MASS INDEX: 35.92 KG/M2 | SYSTOLIC BLOOD PRESSURE: 151 MMHG | TEMPERATURE: 98 F | HEIGHT: 73 IN | DIASTOLIC BLOOD PRESSURE: 88 MMHG

## 2019-11-05 DIAGNOSIS — M51.36 DDD (DEGENERATIVE DISC DISEASE), LUMBAR: ICD-10-CM

## 2019-11-05 DIAGNOSIS — M96.1 POSTLAMINECTOMY SYNDROME OF LUMBAR REGION: Primary | ICD-10-CM

## 2019-11-05 DIAGNOSIS — M47.816 LUMBAR SPONDYLOSIS: ICD-10-CM

## 2019-11-05 DIAGNOSIS — M53.3 SACROILIAC JOINT PAIN: Primary | ICD-10-CM

## 2019-11-05 DIAGNOSIS — M54.16 LUMBAR RADICULOPATHY: ICD-10-CM

## 2019-11-05 DIAGNOSIS — Z79.891 ENCOUNTER FOR MONITORING OPIOID MAINTENANCE THERAPY: ICD-10-CM

## 2019-11-05 DIAGNOSIS — Z98.1 S/P LUMBAR SPINAL FUSION: ICD-10-CM

## 2019-11-05 DIAGNOSIS — G89.4 CHRONIC PAIN SYNDROME: ICD-10-CM

## 2019-11-05 DIAGNOSIS — M96.1 POSTLAMINECTOMY SYNDROME OF LUMBAR REGION: ICD-10-CM

## 2019-11-05 DIAGNOSIS — M43.10 ACQUIRED SPONDYLOLISTHESIS: ICD-10-CM

## 2019-11-05 DIAGNOSIS — Z51.81 ENCOUNTER FOR MONITORING OPIOID MAINTENANCE THERAPY: ICD-10-CM

## 2019-11-05 PROCEDURE — 99213 OFFICE O/P EST LOW 20 MIN: CPT | Mod: PBBFAC | Performed by: NURSE PRACTITIONER

## 2019-11-05 PROCEDURE — 99214 PR OFFICE/OUTPT VISIT, EST, LEVL IV, 30-39 MIN: ICD-10-PCS | Mod: S$PBB,,, | Performed by: NURSE PRACTITIONER

## 2019-11-05 PROCEDURE — 99999 PR PBB SHADOW E&M-EST. PATIENT-LVL III: ICD-10-PCS | Mod: PBBFAC,,, | Performed by: NURSE PRACTITIONER

## 2019-11-05 PROCEDURE — 99214 OFFICE O/P EST MOD 30 MIN: CPT | Mod: S$PBB,,, | Performed by: NURSE PRACTITIONER

## 2019-11-05 PROCEDURE — 99999 PR PBB SHADOW E&M-EST. PATIENT-LVL III: CPT | Mod: PBBFAC,,, | Performed by: NURSE PRACTITIONER

## 2019-11-05 NOTE — H&P (VIEW-ONLY)
Chronic patient Established Note (Follow up visit)      SUBJECTIVE:    Vick Gonzalez presents to the clinic for a follow-up appointment for lower back pain. He is here today for medication refill. He reports increased right sided low back and buttock pain. He reports intermittent radiating pain down the posterior aspect of both legs to his feet, right greater than left. His back pain is greater than his left pain. His pain is worse with prolonged walking and activity. He continues to report benefit with current medication regimen. He continues to take Gabapentin. He also takes Roxicodone as needed with benefit and without adverse effects. He denies any other health changes. His pain today is 7/10.      Pain Disability Index Review:  Last 3 PDI Scores 11/5/2019 8/14/2019 5/15/2019   Pain Disability Index (PDI) 16 32 24       Pain Medications:    - Opioids: Roxicodone (Oxycodone/Acetaminophen) 15 mg QID PRN pain    Others: Gabapentin 2400 mg daily    Opioid Contract: yes     report:  Reviewed and consistent with medication use as prescribed.    Pain Procedures:   1/15/15 Right TFESI @ L5 & S1     Physical Therapy/Home Exercise: yes, per self    Imaging:   Lumbar MRI 6/16/15  Narrative   Technique: Routine lumbar spine MRI performed without contrast.    Comparison: MRI 06/16/2015, CT 06/15/2015.    Findings:    There are postsurgical changes consistent with L5-S1 posterior instrumented fusion with bilateral pedicular screws, vertical stabilizing rods and an intervertebral disk spacer.  When compared to the MRI dated 06/16/2015 00:33, there are new postsurgical   changes consistent with left L5 hemilaminectomy.  There is a 2.5 cm ill-defined fluid collection at the site of hemilaminectomy that is not well evaluated due to the lack of IV contrast but appears to extend anteriorly into the spinal canal and into the   left foraminal zone.  There is as possible small fluid collection within the anterior right epidural  space that may also due to compression of the adjacent spinal canal.  There is stable grade I anterolistheses of L5 on S1 with persistent high signal   intensity adjacent to the left lateral aspect of the disk spacer that demonstrates moderate associated bone marrow edema of the adjacent vertebral endplates, findings that are when compared to the previous exam.  Note is made that there is an apparent   high signal intensity within the nerve roots posterior to the L5-S1 level that was not definitely present on the previous exam.    There is mild persistent height loss loss involving the L5 vertebral body, likely degenerative in nature.  Remaining vertebral body heights are well-maintained without findings to suggest acute fracture.    There is mild intervertebral disk spacing and desiccation at L4-L5 with a small circumferential disk bulge. No disk protrusion or extrusion. There is moderate bilateral facet arthropathy and mild bilateral uncomfortable and buckling at this level.  These   findings contribute to mild spinal canal stenosis and mild bilateral foraminal narrowing.    Noting aforementioned postsurgical changes, there is persistent severe bilateral foraminal narrowing that is difficult to accurately characterize due to adjacent artifact.    There is edema anterior to the L4, L5 and S1 vertebral bodies, presumably postsurgical in nature.  No drainable fluid collections identified. Visualized retroperitoneal organs are unremarkable.   Impression       Postsurgical changes of L5-S1 posterior spinal fusion and left L5 hemilaminectomy. There is an ill-defined fluid collection at the site of hemilaminectomy that is not well evaluated due to the lack of IV contrast but appears to extend anteriorly with   suspected resulting mass effect on the spinal canal and the left foraminal zone.  There is a suspected small fluid collection within the anterior right epidural space that may contribute to additional mass effect  on the adjacent spinal canal. While   findings may represent sequela of expected postsurgical changes, continued follow-up is advised to ensure improvement and/or resolution.    Persistent abnormal high signal intensity adjacent to the left lateral aspect of the intervertebral disk spacer with moderate associated bone marrow edema of the adjacent vertebral endplates. Findings are similar when compared to the previous exam could   represent postsurgical changes. Early infection could have a similar appearance and is possible. Continued follow-up is advised.    Apparent high signal intensity within the nerve roots posterior to the L5-S1 level that was not definitely present on the previous exam. Findings may be artifactual in nature however, sequela of nerve root inflammation/edema is possible noting recent   surgery.    This report has been flagged in the Epic medical record     .  CMP  Sodium   Date Value Ref Range Status   10/04/2019 137 136 - 145 mmol/L Final     Potassium   Date Value Ref Range Status   10/04/2019 4.4 3.5 - 5.1 mmol/L Final     Chloride   Date Value Ref Range Status   10/04/2019 105 95 - 110 mmol/L Final     CO2   Date Value Ref Range Status   10/04/2019 23 23 - 29 mmol/L Final     Glucose   Date Value Ref Range Status   10/04/2019 259 (H) 70 - 110 mg/dL Final     BUN, Bld   Date Value Ref Range Status   10/04/2019 20 8 - 23 mg/dL Final     Creatinine   Date Value Ref Range Status   10/04/2019 1.4 0.5 - 1.4 mg/dL Final     Calcium   Date Value Ref Range Status   10/04/2019 9.9 8.7 - 10.5 mg/dL Final     Total Protein   Date Value Ref Range Status   10/04/2019 8.0 6.0 - 8.4 g/dL Final     Albumin   Date Value Ref Range Status   10/04/2019 3.9 3.5 - 5.2 g/dL Final   07/25/2018 4.5 3.6 - 5.1 g/dL Final     Comment:     @ Test Performed By:  [facility]  Marquis Wright M.D., Ph.D.,   [address] [place] [address]  [facility]  [id number]       Total  "Bilirubin   Date Value Ref Range Status   10/04/2019 0.3 0.1 - 1.0 mg/dL Final     Comment:     For infants and newborns, interpretation of results should be based  on gestational age, weight and in agreement with clinical  observations.  Premature Infant recommended reference ranges:  Up to 24 hours.............<8.0 mg/dL  Up to 48 hours............<12.0 mg/dL  3-5 days..................<15.0 mg/dL  6-29 days.................<15.0 mg/dL       Alkaline Phosphatase   Date Value Ref Range Status   10/04/2019 103 55 - 135 U/L Final     AST   Date Value Ref Range Status   10/04/2019 20 10 - 40 U/L Final     ALT   Date Value Ref Range Status   10/04/2019 30 10 - 44 U/L Final     Anion Gap   Date Value Ref Range Status   10/04/2019 9 8 - 16 mmol/L Final     eGFR if    Date Value Ref Range Status   10/04/2019 >60.0 >60 mL/min/1.73 m^2 Final     eGFR if non    Date Value Ref Range Status   10/04/2019 53.8 (A) >60 mL/min/1.73 m^2 Final     Comment:     Calculation used to obtain the estimated glomerular filtration  rate (eGFR) is the CKD-EPI equation.            Allergies:   Review of patient's allergies indicates:   Allergen Reactions    Naproxen      Other reaction(s): problems w/liver/kid    Oxaprozin      Other reaction(s): cause problems w/kid/liver       Current Medications:   Current Outpatient Medications   Medication Sig Dispense Refill    allopurinol (ZYLOPRIM) 100 MG tablet TAKE 1 TABLET BY MOUTH TWICE A DAY 60 tablet 7    amitriptyline (ELAVIL) 50 MG tablet Take 1 tablet (50 mg total) by mouth every evening. For nerve pain and sleep 30 tablet 11    BD ULTRA-FINE MENDY PEN NEEDLES 32 gauge x 5/32" Ndle       blood sugar diagnostic Strp 1 strip by Misc.(Non-Drug; Combo Route) route 3 (three) times daily. 100 strip 11    calcium carbonate-vitamin D3 (CALTRATE 600 + D) 600 mg(1,500mg) -400 unit Chew       colchicine 0.6 mg tablet Take 1 tablet (0.6 mg total) by mouth 2 (two) " "times daily. 30 tablet 2    dulaglutide (TRULICITY) 1.5 mg/0.5 mL PnIj Inject 1.5 mg (1 syringe) into the skin every 7 days. 2 mL 11    gabapentin (NEURONTIN) 800 MG tablet Take 1 tablet (800 mg total) by mouth 3 (three) times daily. 90 tablet 11    insulin aspart U-100 (NOVOLOG FLEXPEN U-100 INSULIN) 100 unit/mL (3 mL) InPn pen Inject 20 Units into the skin 3 (three) times daily with meals. 15 mL 11    insulin detemir U-100 (LEVEMIR FLEXTOUCH) 100 unit/mL (3 mL) SubQ InPn pen Inject 70 Units into the skin every evening. 15 mL 11    lancets Misc 1 each by Misc.(Non-Drug; Combo Route) route 4 (four) times daily before meals and nightly. 200 each 11    levothyroxine (SYNTHROID) 88 MCG tablet TAKE 1 TABLET (88 MCG TOTAL) BY MOUTH ONCE DAILY. 90 tablet 4    lisinopril (PRINIVIL,ZESTRIL) 40 MG tablet Take 1 tablet (40 mg total) by mouth once daily. 90 tablet 3    metoprolol succinate (TOPROL-XL) 200 MG 24 hr tablet TAKE 1 TABLET (200 MG TOTAL) BY MOUTH ONCE DAILY. 30 tablet 11    multivitamin (THERAGRAN) per tablet Take 1 tablet by mouth.      NIFEdipine (ADALAT CC) 60 MG TbSR TAKE 1 TABLET (60 MG TOTAL) BY MOUTH ONCE DAILY. 90 tablet 1    NOVOFINE PLUS 32 gauge x 1/6" Ndle       ondansetron (ZOFRAN-ODT) 4 MG TbDL Take 1 tablet (4 mg total) by mouth every 8 (eight) hours as needed (n/v). 10 tablet 0    pantoprazole (PROTONIX) 20 MG tablet Take 1 tablet (20 mg total) by mouth once daily. 30 tablet 0    pen needle, diabetic (BD ULTRA-FINE MENDY PEN NEEDLE) 32 gauge x 5/32" Ndle TEST WITH NOVOLOG THREE TIMES A DAY WITH MEALS AND WITH LEVEMIR AT BEDTIME 100 each 11    rosuvastatin (CRESTOR) 5 MG tablet Take 1 tablet (5 mg total) by mouth once daily. 90 tablet 3    sertraline (ZOLOFT) 100 MG tablet TAKE 1 TABLET (100 MG TOTAL) BY MOUTH ONCE DAILY. 30 tablet 7    sildenafil (REVATIO) 20 mg Tab Take 5 by mouth 1 hour before sexual activity 25 tablet 11    tacrolimus (PROGRAF) 1 MG Cap TAKE 2 CAPSULES (2 MG " TOTAL) BY MOUTH IN THE MORNING & TAKE 1 CAPSULE (1 MG TOTAL) IN THE EVENING 90 capsule 11    TRUE METRIX GLUCOSE METER Misc TEST 4 (FOUR) TIMES DAILY BEFORE MEALS AND NIGHTLY. 1 each 0     No current facility-administered medications for this visit.        REVIEW OF SYSTEMS:    GENERAL:  No weight loss, malaise or fevers.  HEENT:  Negative for frequent or significant headaches.  NECK:  Negative for lumps, goiter, pain and significant neck swelling.  RESPIRATORY:  Negative for cough, wheezing or shortness of breath.  CARDIOVASCULAR:  Negative for chest pain, leg swelling or palpitations. H/O hypertension.  GI:  Negative for abdominal discomfort, blood in stools or black stools or change in bowel habits.  MUSCULOSKELETAL:  See HPI.  SKIN:  Negative for lesions, rash, and itching.  PSYCH:  Negative for sleep disturbance, mood disorder and recent psychosocial stressors.  HEMATOLOGY/LYMPHOLOGY:  Negative for prolonged bleeding, bruising easily or swollen nodes. H/O liver transplant.  NEURO:   No history of headaches, syncope, paralysis, seizures or tremors.  ENDO: Diabetes.   All other reviewed and negative other than HPI.    Past Medical History:  Past Medical History:   Diagnosis Date    Anemia     Anxiety     Chronic pain syndrome 7/13/2011    CKD (chronic kidney disease), stage III     Diabetes mellitus type II, uncontrolled     Discitis of lumbosacral region 1/16/2015    ED (erectile dysfunction)     Genital herpes     Gout, arthritis     History of alcohol abuse     History of hepatitis C, s/p successful Rx w/ SVR24 (cure) - 5/2018 8/23/2011    Completed 24wks Epclusa + RBV w/SVR12 - 2/2018  -     History of positive PPD, treatment status unknown     Pulmonary granulomas, negative sputum cultures for AFB and indeterminate quantferon test    History of substance abuse     Hypertension     Hypothyroidism     Liver replaced by transplant 8/23/2011    DATE: 12/16/2013  LIVER BIOPSY : REASON:  hep C  "staging  PATHOLOGY COMMITTEE NOTE/PLAN: :grade  1 / stage 1        Pancreatitis 2016    Peptic ulcer disease        Past Surgical History:  Past Surgical History:   Procedure Laterality Date    CHOLECYSTECTOMY      LIVER TRANSPLANT  06/2010    SPINE SURGERY         Family History:  Family History   Problem Relation Age of Onset    Cancer Mother     Diabetes Mother     Heart disease Mother     Diabetes Sister     Cancer Maternal Uncle 82        colon CA    Melanoma Neg Hx     Psoriasis Neg Hx     Lupus Neg Hx     Eczema Neg Hx     Acne Neg Hx        Social History:  Social History     Socioeconomic History    Marital status:      Spouse name: Not on file    Number of children: Not on file    Years of education: Not on file    Highest education level: Not on file   Occupational History    Not on file   Social Needs    Financial resource strain: Not on file    Food insecurity:     Worry: Not on file     Inability: Not on file    Transportation needs:     Medical: Not on file     Non-medical: Not on file   Tobacco Use    Smoking status: Former Smoker    Smokeless tobacco: Never Used   Substance and Sexual Activity    Alcohol use: No     Comment: over 5 years ago, none currently    Drug use: No    Sexual activity: Not on file   Lifestyle    Physical activity:     Days per week: Not on file     Minutes per session: Not on file    Stress: Not on file   Relationships    Social connections:     Talks on phone: Not on file     Gets together: Not on file     Attends [employer] service: Not on file     Active member of club or organization: Not on file     Attends meetings of clubs or organizations: Not on file     Relationship status: Not on file   Other Topics Concern    Not on file   Social History Narrative    Not on file       OBJECTIVE:    BP (!) 151/88   Pulse 103   Temp 97.5 °F (36.4 °C)   Ht 6' 1" (1.854 m)   Wt 122.9 kg (271 lb)   BMI 35.75 kg/m²     PHYSICAL " EXAMINATION:    General appearance: Well appearing, in no acute distress, alert and oriented x3.  Psych:  Mood and affect appropriate.  Skin: Skin color, texture, turgor normal, no rashes or lesions, in both upper and lower body.  Head/face:  Atraumatic, normocephalic. No palpable lymph nodes.  GI: Abdomen soft. TTP over right upper quadrant along scar.   Back: Straight leg raising in the sitting position is negative to radicular pain. There is pain with palpation over lumbar spine. Limited ROM with pain on extension. Positive facet loading bilaterally.   Extremities: Peripheral joint ROM is full and pain free without obvious instability or laxity in all four extremities. There is pain with palpation over right SI joint. FABERs is positive on the right, negative on the left. No deformities or skin discoloration. Good capillary refill.   Musculoskeletal:  Right plantar flexion 4/5.  All other LE strength 5/5 and symmetric.  No atrophy or tone abnormalities are noted.  Neuro: Bilateral upper and lower extremity coordination and muscle stretch reflexes are physiologic and symmetric.  Plantar response are downgoing.  Decreased sensation to anterior aspect of right foot.   Gait: Antalgic- ambulates with cane.    ASSESSMENT: 63 y.o. year old male with lower back and right leg pain, consistent with the following diagnoses:     1. Sacroiliac joint pain     2. Postlaminectomy syndrome of lumbar region     3. S/P lumbar spinal fusion     4. Lumbar radiculopathy     5. Acquired spondylolisthesis     6. DDD (degenerative disc disease), lumbar     7. Lumbar spondylosis     8. Chronic pain syndrome     9. Encounter for monitoring opioid maintenance therapy           PLAN:     - Previous imaging was reviewed and discussed with the patient today. Recent labs reviewed with patient today.     - Schedule for right SI joint injection.   The procedure, risks, benefits and options were discussed with patient. There are no  contraindications to the procedure. The patient expressed understanding and agreed to proceed.  Consent obtained today.    - Patient can continue Roxicodone 15 mg QID PRN pain, #120. Rx sent today with appropriate date. He may call for 2 additional refills.     - The patient is here today for a refill of current pain medications and they believe these provide effective pain control and improvements in quality of life.  The patient notes no serious side effects, and feels the benefits outweigh the risks.  The patient was reminded of the pain contract that they signed previously as well as the risks and benefits of the medication including possible death.  The updated [place] Board of Pharm[employer] prescription monitoring program was reviewed, and the patient has been found to be compliant with current treatment plan. Medication management provided by Dr. Penn.     - Continue Gabapentin 800 mg TID.     - Pain Contract signed 8/14/2019.     - UDS from 8/22/2018 reviewed and consistent. Serum screen from 8/14/2019 reviewed.     - The patient will continue a home exercise routine to help with pain and strengthening.      - RTC in 3 months or sooner if needed.    - Counseled patient regarding the importance of constant sleeping habits and physical therapy.     The above plan and management options were discussed at length with patient. Patient is in agreement with the above and verbalized understanding.    Emma Batista NP  11/05/2019

## 2019-11-05 NOTE — PROGRESS NOTES
Chronic patient Established Note (Follow up visit)      SUBJECTIVE:    Vick Gonzalez presents to the clinic for a follow-up appointment for lower back pain. He is here today for medication refill. He reports increased right sided low back and buttock pain. He reports intermittent radiating pain down the posterior aspect of both legs to his feet, right greater than left. His back pain is greater than his left pain. His pain is worse with prolonged walking and activity. He continues to report benefit with current medication regimen. He continues to take Gabapentin. He also takes Roxicodone as needed with benefit and without adverse effects. He denies any other health changes. His pain today is 7/10.      Pain Disability Index Review:  Last 3 PDI Scores 11/5/2019 8/14/2019 5/15/2019   Pain Disability Index (PDI) 16 32 24       Pain Medications:    - Opioids: Roxicodone (Oxycodone/Acetaminophen) 15 mg QID PRN pain    Others: Gabapentin 2400 mg daily    Opioid Contract: yes     report:  Reviewed and consistent with medication use as prescribed.    Pain Procedures:   1/15/15 Right TFESI @ L5 & S1     Physical Therapy/Home Exercise: yes, per self    Imaging:   Lumbar MRI 6/16/15  Narrative   Technique: Routine lumbar spine MRI performed without contrast.    Comparison: MRI 06/16/2015, CT 06/15/2015.    Findings:    There are postsurgical changes consistent with L5-S1 posterior instrumented fusion with bilateral pedicular screws, vertical stabilizing rods and an intervertebral disk spacer.  When compared to the MRI dated 06/16/2015 00:33, there are new postsurgical   changes consistent with left L5 hemilaminectomy.  There is a 2.5 cm ill-defined fluid collection at the site of hemilaminectomy that is not well evaluated due to the lack of IV contrast but appears to extend anteriorly into the spinal canal and into the   left foraminal zone.  There is as possible small fluid collection within the anterior right epidural  space that may also due to compression of the adjacent spinal canal.  There is stable grade I anterolistheses of L5 on S1 with persistent high signal   intensity adjacent to the left lateral aspect of the disk spacer that demonstrates moderate associated bone marrow edema of the adjacent vertebral endplates, findings that are when compared to the previous exam.  Note is made that there is an apparent   high signal intensity within the nerve roots posterior to the L5-S1 level that was not definitely present on the previous exam.    There is mild persistent height loss loss involving the L5 vertebral body, likely degenerative in nature.  Remaining vertebral body heights are well-maintained without findings to suggest acute fracture.    There is mild intervertebral disk spacing and desiccation at L4-L5 with a small circumferential disk bulge. No disk protrusion or extrusion. There is moderate bilateral facet arthropathy and mild bilateral uncomfortable and buckling at this level.  These   findings contribute to mild spinal canal stenosis and mild bilateral foraminal narrowing.    Noting aforementioned postsurgical changes, there is persistent severe bilateral foraminal narrowing that is difficult to accurately characterize due to adjacent artifact.    There is edema anterior to the L4, L5 and S1 vertebral bodies, presumably postsurgical in nature.  No drainable fluid collections identified. Visualized retroperitoneal organs are unremarkable.   Impression       Postsurgical changes of L5-S1 posterior spinal fusion and left L5 hemilaminectomy. There is an ill-defined fluid collection at the site of hemilaminectomy that is not well evaluated due to the lack of IV contrast but appears to extend anteriorly with   suspected resulting mass effect on the spinal canal and the left foraminal zone.  There is a suspected small fluid collection within the anterior right epidural space that may contribute to additional mass effect  on the adjacent spinal canal. While   findings may represent sequela of expected postsurgical changes, continued follow-up is advised to ensure improvement and/or resolution.    Persistent abnormal high signal intensity adjacent to the left lateral aspect of the intervertebral disk spacer with moderate associated bone marrow edema of the adjacent vertebral endplates. Findings are similar when compared to the previous exam could   represent postsurgical changes. Early infection could have a similar appearance and is possible. Continued follow-up is advised.    Apparent high signal intensity within the nerve roots posterior to the L5-S1 level that was not definitely present on the previous exam. Findings may be artifactual in nature however, sequela of nerve root inflammation/edema is possible noting recent   surgery.    This report has been flagged in the Epic medical record     .  CMP  Sodium   Date Value Ref Range Status   10/04/2019 137 136 - 145 mmol/L Final     Potassium   Date Value Ref Range Status   10/04/2019 4.4 3.5 - 5.1 mmol/L Final     Chloride   Date Value Ref Range Status   10/04/2019 105 95 - 110 mmol/L Final     CO2   Date Value Ref Range Status   10/04/2019 23 23 - 29 mmol/L Final     Glucose   Date Value Ref Range Status   10/04/2019 259 (H) 70 - 110 mg/dL Final     BUN, Bld   Date Value Ref Range Status   10/04/2019 20 8 - 23 mg/dL Final     Creatinine   Date Value Ref Range Status   10/04/2019 1.4 0.5 - 1.4 mg/dL Final     Calcium   Date Value Ref Range Status   10/04/2019 9.9 8.7 - 10.5 mg/dL Final     Total Protein   Date Value Ref Range Status   10/04/2019 8.0 6.0 - 8.4 g/dL Final     Albumin   Date Value Ref Range Status   10/04/2019 3.9 3.5 - 5.2 g/dL Final   07/25/2018 4.5 3.6 - 5.1 g/dL Final     Comment:     @ Test Performed By:  [employer] [facility]  Marquis Wright M.D., Ph.D.,   [address] [place] [address]  [facility]  [id number]       Total  "Bilirubin   Date Value Ref Range Status   10/04/2019 0.3 0.1 - 1.0 mg/dL Final     Comment:     For infants and newborns, interpretation of results should be based  on gestational age, weight and in agreement with clinical  observations.  Premature Infant recommended reference ranges:  Up to 24 hours.............<8.0 mg/dL  Up to 48 hours............<12.0 mg/dL  3-5 days..................<15.0 mg/dL  6-29 days.................<15.0 mg/dL       Alkaline Phosphatase   Date Value Ref Range Status   10/04/2019 103 55 - 135 U/L Final     AST   Date Value Ref Range Status   10/04/2019 20 10 - 40 U/L Final     ALT   Date Value Ref Range Status   10/04/2019 30 10 - 44 U/L Final     Anion Gap   Date Value Ref Range Status   10/04/2019 9 8 - 16 mmol/L Final     eGFR if    Date Value Ref Range Status   10/04/2019 >60.0 >60 mL/min/1.73 m^2 Final     eGFR if non    Date Value Ref Range Status   10/04/2019 53.8 (A) >60 mL/min/1.73 m^2 Final     Comment:     Calculation used to obtain the estimated glomerular filtration  rate (eGFR) is the CKD-EPI equation.            Allergies:   Review of patient's allergies indicates:   Allergen Reactions    Naproxen      Other reaction(s): problems w/liver/kid    Oxaprozin      Other reaction(s): cause problems w/kid/liver       Current Medications:   Current Outpatient Medications   Medication Sig Dispense Refill    allopurinol (ZYLOPRIM) 100 MG tablet TAKE 1 TABLET BY MOUTH TWICE A DAY 60 tablet 7    amitriptyline (ELAVIL) 50 MG tablet Take 1 tablet (50 mg total) by mouth every evening. For nerve pain and sleep 30 tablet 11    BD ULTRA-FINE MENDY PEN NEEDLES 32 gauge x 5/32" Ndle       blood sugar diagnostic Strp 1 strip by Misc.(Non-Drug; Combo Route) route 3 (three) times daily. 100 strip 11    calcium carbonate-vitamin D3 (CALTRATE 600 + D) 600 mg(1,500mg) -400 unit Chew       colchicine 0.6 mg tablet Take 1 tablet (0.6 mg total) by mouth 2 (two) " "times daily. 30 tablet 2    dulaglutide (TRULICITY) 1.5 mg/0.5 mL PnIj Inject 1.5 mg (1 syringe) into the skin every 7 days. 2 mL 11    gabapentin (NEURONTIN) 800 MG tablet Take 1 tablet (800 mg total) by mouth 3 (three) times daily. 90 tablet 11    insulin aspart U-100 (NOVOLOG FLEXPEN U-100 INSULIN) 100 unit/mL (3 mL) InPn pen Inject 20 Units into the skin 3 (three) times daily with meals. 15 mL 11    insulin detemir U-100 (LEVEMIR FLEXTOUCH) 100 unit/mL (3 mL) SubQ InPn pen Inject 70 Units into the skin every evening. 15 mL 11    lancets Misc 1 each by Misc.(Non-Drug; Combo Route) route 4 (four) times daily before meals and nightly. 200 each 11    levothyroxine (SYNTHROID) 88 MCG tablet TAKE 1 TABLET (88 MCG TOTAL) BY MOUTH ONCE DAILY. 90 tablet 4    lisinopril (PRINIVIL,ZESTRIL) 40 MG tablet Take 1 tablet (40 mg total) by mouth once daily. 90 tablet 3    metoprolol succinate (TOPROL-XL) 200 MG 24 hr tablet TAKE 1 TABLET (200 MG TOTAL) BY MOUTH ONCE DAILY. 30 tablet 11    multivitamin (THERAGRAN) per tablet Take 1 tablet by mouth.      NIFEdipine (ADALAT CC) 60 MG TbSR TAKE 1 TABLET (60 MG TOTAL) BY MOUTH ONCE DAILY. 90 tablet 1    NOVOFINE PLUS 32 gauge x 1/6" Ndle       ondansetron (ZOFRAN-ODT) 4 MG TbDL Take 1 tablet (4 mg total) by mouth every 8 (eight) hours as needed (n/v). 10 tablet 0    pantoprazole (PROTONIX) 20 MG tablet Take 1 tablet (20 mg total) by mouth once daily. 30 tablet 0    pen needle, diabetic (BD ULTRA-FINE MENDY PEN NEEDLE) 32 gauge x 5/32" Ndle TEST WITH NOVOLOG THREE TIMES A DAY WITH MEALS AND WITH LEVEMIR AT BEDTIME 100 each 11    rosuvastatin (CRESTOR) 5 MG tablet Take 1 tablet (5 mg total) by mouth once daily. 90 tablet 3    sertraline (ZOLOFT) 100 MG tablet TAKE 1 TABLET (100 MG TOTAL) BY MOUTH ONCE DAILY. 30 tablet 7    sildenafil (REVATIO) 20 mg Tab Take 5 by mouth 1 hour before sexual activity 25 tablet 11    tacrolimus (PROGRAF) 1 MG Cap TAKE 2 CAPSULES (2 MG " TOTAL) BY MOUTH IN THE MORNING & TAKE 1 CAPSULE (1 MG TOTAL) IN THE EVENING 90 capsule 11    TRUE METRIX GLUCOSE METER Misc TEST 4 (FOUR) TIMES DAILY BEFORE MEALS AND NIGHTLY. 1 each 0     No current facility-administered medications for this visit.        REVIEW OF SYSTEMS:    GENERAL:  No weight loss, malaise or fevers.  HEENT:  Negative for frequent or significant headaches.  NECK:  Negative for lumps, goiter, pain and significant neck swelling.  RESPIRATORY:  Negative for cough, wheezing or shortness of breath.  CARDIOVASCULAR:  Negative for chest pain, leg swelling or palpitations. H/O hypertension.  GI:  Negative for abdominal discomfort, blood in stools or black stools or change in bowel habits.  MUSCULOSKELETAL:  See HPI.  SKIN:  Negative for lesions, rash, and itching.  PSYCH:  Negative for sleep disturbance, mood disorder and recent psychosocial stressors.  HEMATOLOGY/LYMPHOLOGY:  Negative for prolonged bleeding, bruising easily or swollen nodes. H/O liver transplant.  NEURO:   No history of headaches, syncope, paralysis, seizures or tremors.  ENDO: Diabetes.   All other reviewed and negative other than HPI.    Past Medical History:  Past Medical History:   Diagnosis Date    Anemia     Anxiety     Chronic pain syndrome 7/13/2011    CKD (chronic kidney disease), stage III     Diabetes mellitus type II, uncontrolled     Discitis of lumbosacral region 1/16/2015    ED (erectile dysfunction)     Genital herpes     Gout, arthritis     History of alcohol abuse     History of hepatitis C, s/p successful Rx w/ SVR24 (cure) - 5/2018 8/23/2011    Completed 24wks Epclusa + RBV w/SVR12 - 2/2018  -     History of positive PPD, treatment status unknown     Pulmonary granulomas, negative sputum cultures for AFB and indeterminate quantferon test    History of substance abuse     Hypertension     Hypothyroidism     Liver replaced by transplant 8/23/2011    DATE: 12/16/2013  LIVER BIOPSY : REASON:  hep C  "staging  PATHOLOGY COMMITTEE NOTE/PLAN: :grade  1 / stage 1        Pancreatitis 2016    Peptic ulcer disease        Past Surgical History:  Past Surgical History:   Procedure Laterality Date    CHOLECYSTECTOMY      LIVER TRANSPLANT  06/2010    SPINE SURGERY         Family History:  Family History   Problem Relation Age of Onset    Cancer Mother     Diabetes Mother     Heart disease Mother     Diabetes Sister     Cancer Maternal Uncle 82        colon CA    Melanoma Neg Hx     Psoriasis Neg Hx     Lupus Neg Hx     Eczema Neg Hx     Acne Neg Hx        Social History:  Social History     Socioeconomic History    Marital status:      Spouse name: Not on file    Number of children: Not on file    Years of education: Not on file    Highest education level: Not on file   Occupational History    Not on file   Social Needs    Financial resource strain: Not on file    Food insecurity:     Worry: Not on file     Inability: Not on file    Transportation needs:     Medical: Not on file     Non-medical: Not on file   Tobacco Use    Smoking status: Former Smoker    Smokeless tobacco: Never Used   Substance and Sexual Activity    Alcohol use: No     Comment: over 5 years ago, none currently    Drug use: No    Sexual activity: Not on file   Lifestyle    Physical activity:     Days per week: Not on file     Minutes per session: Not on file    Stress: Not on file   Relationships    Social connections:     Talks on phone: Not on file     Gets together: Not on file     Attends Buddhism service: Not on file     Active member of club or organization: Not on file     Attends meetings of clubs or organizations: Not on file     Relationship status: Not on file   Other Topics Concern    Not on file   Social History Narrative    Not on file       OBJECTIVE:    BP (!) 151/88   Pulse 103   Temp 97.5 °F (36.4 °C)   Ht 6' 1" (1.854 m)   Wt 122.9 kg (271 lb)   BMI 35.75 kg/m²     PHYSICAL " EXAMINATION:    General appearance: Well appearing, in no acute distress, alert and oriented x3.  Psych:  Mood and affect appropriate.  Skin: Skin color, texture, turgor normal, no rashes or lesions, in both upper and lower body.  Head/face:  Atraumatic, normocephalic. No palpable lymph nodes.  GI: Abdomen soft. TTP over right upper quadrant along scar.   Back: Straight leg raising in the sitting position is negative to radicular pain. There is pain with palpation over lumbar spine. Limited ROM with pain on extension. Positive facet loading bilaterally.   Extremities: Peripheral joint ROM is full and pain free without obvious instability or laxity in all four extremities. There is pain with palpation over right SI joint. FABERs is positive on the right, negative on the left. No deformities or skin discoloration. Good capillary refill.   Musculoskeletal:  Right plantar flexion 4/5.  All other LE strength 5/5 and symmetric.  No atrophy or tone abnormalities are noted.  Neuro: Bilateral upper and lower extremity coordination and muscle stretch reflexes are physiologic and symmetric.  Plantar response are downgoing.  Decreased sensation to anterior aspect of right foot.   Gait: Antalgic- ambulates with cane.    ASSESSMENT: 63 y.o. year old male with lower back and right leg pain, consistent with the following diagnoses:     1. Sacroiliac joint pain     2. Postlaminectomy syndrome of lumbar region     3. S/P lumbar spinal fusion     4. Lumbar radiculopathy     5. Acquired spondylolisthesis     6. DDD (degenerative disc disease), lumbar     7. Lumbar spondylosis     8. Chronic pain syndrome     9. Encounter for monitoring opioid maintenance therapy           PLAN:     - Previous imaging was reviewed and discussed with the patient today. Recent labs reviewed with patient today.     - Schedule for right SI joint injection.   The procedure, risks, benefits and options were discussed with patient. There are no  contraindications to the procedure. The patient expressed understanding and agreed to proceed.  Consent obtained today.    - Patient can continue Roxicodone 15 mg QID PRN pain, #120. Rx sent today with appropriate date. He may call for 2 additional refills.     - The patient is here today for a refill of current pain medications and they believe these provide effective pain control and improvements in quality of life.  The patient notes no serious side effects, and feels the benefits outweigh the risks.  The patient was reminded of the pain contract that they signed previously as well as the risks and benefits of the medication including possible death.  The updated [place] Board of Pharmacy prescription monitoring program was reviewed, and the patient has been found to be compliant with current treatment plan. Medication management provided by Dr. Penn.     - Continue Gabapentin 800 mg TID.     - Pain Contract signed 8/14/2019.     - UDS from 8/22/2018 reviewed and consistent. Serum screen from 8/14/2019 reviewed.     - The patient will continue a home exercise routine to help with pain and strengthening.      - RTC in 3 months or sooner if needed.    - Counseled patient regarding the importance of constant sleeping habits and physical therapy.     The above plan and management options were discussed at length with patient. Patient is in agreement with the above and verbalized understanding.    Emma Batista NP  11/05/2019

## 2019-11-07 RX ORDER — OXYCODONE HYDROCHLORIDE 15 MG/1
15 TABLET ORAL EVERY 6 HOURS PRN
Qty: 120 TABLET | Refills: 0 | Status: SHIPPED | OUTPATIENT
Start: 2019-11-09 | End: 2019-11-25 | Stop reason: SDUPTHER

## 2019-11-25 DIAGNOSIS — Z98.1 S/P LUMBAR SPINAL FUSION: ICD-10-CM

## 2019-11-25 DIAGNOSIS — M96.1 POSTLAMINECTOMY SYNDROME OF LUMBAR REGION: ICD-10-CM

## 2019-11-25 DIAGNOSIS — M54.16 LUMBAR RADICULOPATHY: ICD-10-CM

## 2019-11-25 DIAGNOSIS — M43.10 ACQUIRED SPONDYLOLISTHESIS: ICD-10-CM

## 2019-11-25 DIAGNOSIS — M51.36 DDD (DEGENERATIVE DISC DISEASE), LUMBAR: ICD-10-CM

## 2019-11-25 DIAGNOSIS — G89.4 CHRONIC PAIN SYNDROME: ICD-10-CM

## 2019-11-25 DIAGNOSIS — M47.816 LUMBAR SPONDYLOSIS: ICD-10-CM

## 2019-11-25 RX ORDER — OXYCODONE HYDROCHLORIDE 15 MG/1
15 TABLET ORAL EVERY 6 HOURS PRN
Qty: 120 TABLET | Refills: 0 | Status: SHIPPED | OUTPATIENT
Start: 2019-11-25 | End: 2019-12-25

## 2019-11-25 NOTE — TELEPHONE ENCOUNTER
Patient requesting refill on Roxicodone 15mg  Last office visit 11.05.19   shows last refill on 11.07.19  Patient does have a pain contract on file with [facility] department  Patient last UDS 02.13.19 was consistent with current therapy

## 2019-11-25 NOTE — TELEPHONE ENCOUNTER
----- Message from Anitha Blum sent at 11/25/2019  2:48 PM CST -----  Contact: VIRGIL GR [[id number]]      MEDICATION  REFILL  REQUEST      Please refill the medication(s) listed below. Please call the patient when the prescription(s) is ready for  at this phone number  [phone] (M)      Medication #1  oxyCODONE (ROXICODONE) 15 MG Tab          Preferred Pharmacy:  [facility] ([place]) - [address]     [phone] (Phone)   [phone] (Fax)

## 2019-12-02 ENCOUNTER — HOSPITAL ENCOUNTER (OUTPATIENT)
Facility: OTHER | Age: 64
Discharge: HOME | End: 2019-12-02
Attending: ANESTHESIOLOGY | Admitting: ANESTHESIOLOGY
Payer: MEDICARE

## 2019-12-02 VITALS
WEIGHT: 230 LBS | TEMPERATURE: 98 F | HEIGHT: 75 IN | RESPIRATION RATE: 18 BRPM | HEART RATE: 100 BPM | OXYGEN SATURATION: 99 % | SYSTOLIC BLOOD PRESSURE: 146 MMHG | DIASTOLIC BLOOD PRESSURE: 90 MMHG | BODY MASS INDEX: 28.6 KG/M2

## 2019-12-02 DIAGNOSIS — M46.1 SACROILIITIS: ICD-10-CM

## 2019-12-02 LAB — POCT GLUCOSE: 202 MG/DL (ref 70–110)

## 2019-12-02 PROCEDURE — 27096 INJECT SACROILIAC JOINT: CPT | Mod: RT,,, | Performed by: ANESTHESIOLOGY

## 2019-12-02 PROCEDURE — 25000003 PHARM REV CODE 250: Performed by: ANESTHESIOLOGY

## 2019-12-02 PROCEDURE — 25500020 PHARM REV CODE 255: Performed by: ANESTHESIOLOGY

## 2019-12-02 PROCEDURE — 27096 INJECT SACROILIAC JOINT: CPT | Performed by: ANESTHESIOLOGY

## 2019-12-02 PROCEDURE — 27096 PR INJECTION,SACROILIAC JOINT: ICD-10-PCS | Mod: RT,,, | Performed by: ANESTHESIOLOGY

## 2019-12-02 RX ORDER — LIDOCAINE HYDROCHLORIDE 10 MG/ML
INJECTION INFILTRATION; PERINEURAL
Status: DISCONTINUED | OUTPATIENT
Start: 2019-12-02 | End: 2019-12-02 | Stop reason: HOSPADM

## 2019-12-02 RX ORDER — SODIUM CHLORIDE 9 MG/ML
500 INJECTION, SOLUTION INTRAVENOUS CONTINUOUS
Status: DISCONTINUED | OUTPATIENT
Start: 2019-12-02 | End: 2019-12-02 | Stop reason: HOSPADM

## 2019-12-02 RX ORDER — BUPIVACAINE HYDROCHLORIDE 2.5 MG/ML
INJECTION, SOLUTION EPIDURAL; INFILTRATION; INTRACAUDAL
Status: DISCONTINUED | OUTPATIENT
Start: 2019-12-02 | End: 2019-12-02 | Stop reason: HOSPADM

## 2019-12-02 RX ORDER — ALPRAZOLAM 0.5 MG/1
1 TABLET ORAL ONCE
Status: COMPLETED | OUTPATIENT
Start: 2019-12-02 | End: 2019-12-02

## 2019-12-02 RX ADMIN — ALPRAZOLAM 1 MG: 0.5 TABLET ORAL at 08:12

## 2019-12-02 NOTE — DISCHARGE SUMMARY
"Discharge Note  Short Stay      SUMMARY     Admit Date: 12/2/2019    Attending Physician: Thu Penn      Discharge Physician: Thu Penn      Discharge Date: 12/2/2019 10:26 AM    Procedure(s) (LRB):  INJECTION, JOINT SI (Right)    Final Diagnosis: Sacroiliitis [M46.1]    Disposition: Home or self care    Patient Instructions:   Current Discharge Medication List      CONTINUE these medications which have NOT CHANGED    Details   allopurinol (ZYLOPRIM) 100 MG tablet TAKE 1 TABLET BY MOUTH TWICE A DAY  Qty: 60 tablet, Refills: 7      amitriptyline (ELAVIL) 50 MG tablet Take 1 tablet (50 mg total) by mouth every evening. For nerve pain and sleep  Qty: 30 tablet, Refills: 11      !! BD ULTRA-FINE MENDY PEN NEEDLES 32 gauge x 5/32" Ndle       blood sugar diagnostic Strp 1 strip by Misc.(Non-Drug; Combo Route) route 3 (three) times daily.  Qty: 100 strip, Refills: 11    Associated Diagnoses: Uncontrolled type 2 diabetes mellitus with stage 2 chronic kidney disease, without long-term current use of insulin      calcium carbonate-vitamin D3 (CALTRATE 600 + D) 600 mg(1,500mg) -400 unit Chew       colchicine 0.6 mg tablet Take 1 tablet (0.6 mg total) by mouth 2 (two) times daily.  Qty: 30 tablet, Refills: 2      dulaglutide (TRULICITY) 1.5 mg/0.5 mL PnIj Inject 1.5 mg (1 syringe) into the skin every 7 days.  Qty: 2 mL, Refills: 11      gabapentin (NEURONTIN) 800 MG tablet Take 1 tablet (800 mg total) by mouth 3 (three) times daily.  Qty: 90 tablet, Refills: 11    Associated Diagnoses: Postlaminectomy syndrome of lumbar region; Lumbar radiculopathy; Acquired spondylolisthesis; Chronic pain syndrome; Chronic, continuous use of opioids      insulin aspart U-100 (NOVOLOG FLEXPEN U-100 INSULIN) 100 unit/mL (3 mL) InPn pen Inject 20 Units into the skin 3 (three) times daily with meals.  Qty: 15 mL, Refills: 11      insulin detemir U-100 (LEVEMIR FLEXTOUCH) 100 unit/mL (3 mL) SubQ InPn pen Inject 70 Units into the skin every " "evening.  Qty: 15 mL, Refills: 11      lancets Misc 1 each by Misc.(Non-Drug; Combo Route) route 4 (four) times daily before meals and nightly.  Qty: 200 each, Refills: 11    Associated Diagnoses: Uncontrolled type 2 diabetes mellitus with stage 2 chronic kidney disease, without long-term current use of insulin      levothyroxine (SYNTHROID) 88 MCG tablet TAKE 1 TABLET (88 MCG TOTAL) BY MOUTH ONCE DAILY.  Qty: 90 tablet, Refills: 4      lisinopril (PRINIVIL,ZESTRIL) 40 MG tablet Take 1 tablet (40 mg total) by mouth once daily.  Qty: 90 tablet, Refills: 3      metoprolol succinate (TOPROL-XL) 200 MG 24 hr tablet TAKE 1 TABLET (200 MG TOTAL) BY MOUTH ONCE DAILY.  Qty: 30 tablet, Refills: 11      multivitamin (THERAGRAN) per tablet Take 1 tablet by mouth.      NIFEdipine (ADALAT CC) 60 MG TbSR TAKE 1 TABLET (60 MG TOTAL) BY MOUTH ONCE DAILY.  Qty: 90 tablet, Refills: 1      NOVOFINE PLUS 32 gauge x 1/6" Ndle       ondansetron (ZOFRAN-ODT) 4 MG TbDL Take 1 tablet (4 mg total) by mouth every 8 (eight) hours as needed (n/v).  Qty: 10 tablet, Refills: 0      oxyCODONE (ROXICODONE) 15 MG Tab Take 1 tablet (15 mg total) by mouth every 6 (six) hours as needed for Pain.  Qty: 120 tablet, Refills: 0    Comments: Greater than 7 day supply, medically necessary  Associated Diagnoses: Postlaminectomy syndrome of lumbar region; S/P lumbar spinal fusion; Lumbar radiculopathy; Acquired spondylolisthesis; DDD (degenerative disc disease), lumbar; Lumbar spondylosis; Chronic pain syndrome      pantoprazole (PROTONIX) 20 MG tablet Take 1 tablet (20 mg total) by mouth once daily.  Qty: 30 tablet, Refills: 0      !! pen needle, diabetic (BD ULTRA-FINE MENDY PEN NEEDLE) 32 gauge x 5/32" Ndle TEST WITH NOVOLOG THREE TIMES A DAY WITH MEALS AND WITH LEVEMIR AT BEDTIME  Qty: 100 each, Refills: 11      rosuvastatin (CRESTOR) 5 MG tablet Take 1 tablet (5 mg total) by mouth once daily.  Qty: 90 tablet, Refills: 3      sertraline (ZOLOFT) 100 MG " tablet TAKE 1 TABLET (100 MG TOTAL) BY MOUTH ONCE DAILY.  Qty: 30 tablet, Refills: 7      sildenafil (REVATIO) 20 mg Tab Take 5 by mouth 1 hour before sexual activity  Qty: 25 tablet, Refills: 11    Associated Diagnoses: Type 2 diabetes mellitus with circulatory disorder causing erectile dysfunction; Erectile dysfunction, unspecified erectile dysfunction type      tacrolimus (PROGRAF) 1 MG Cap TAKE 2 CAPSULES (2 MG TOTAL) BY MOUTH IN THE MORNING & TAKE 1 CAPSULE (1 MG TOTAL) IN THE EVENING  Qty: 90 capsule, Refills: 11    Associated Diagnoses: Liver replaced by transplant      TRUE METRIX GLUCOSE METER Misc TEST 4 (FOUR) TIMES DAILY BEFORE MEALS AND NIGHTLY.  Qty: 1 each, Refills: 0    Associated Diagnoses: Uncontrolled type 2 diabetes mellitus with stage 2 chronic kidney disease, without long-term current use of insulin       !! - Potential duplicate medications found. Please discuss with provider.              Discharge Diagnosis: Sacroiliitis [M46.1]  Condition on Discharge: Stable with no complications to procedure   Diet on Discharge: Same as before.  Activity: as per instruction sheet.  Discharge to: Home with a responsible adult.  Follow up: 2-4 weeks       Please call my office or pager at [phone] if experienced any weakness or loss of sensation, fever > 101.5, pain uncontrolled with oral medications, persistent nausea/vomiting/or diarrhea, redness or drainage from the incisions, or any other worrisome concerns. If physician on call was not reached or could not communicate with our office for any reason please go to the nearest emergency department

## 2019-12-02 NOTE — INTERVAL H&P NOTE
The patient has been examined and the H&P has been reviewed:    I concur with the findings and no changes have occurred since H&P was written.    Anesthesia/Surgery risks, benefits and alternative options discussed and understood by patient/family.          Active Hospital Problems    Diagnosis  POA    Sacroiliitis [M46.1]  Yes      Resolved Hospital Problems   No resolved problems to display.

## 2019-12-02 NOTE — OP NOTE
Sacroiliac Joint Injection under Fluoroscopy  Time-out taken to identify patient and procedure side prior to starting the procedure.   I attest that I have reviewed the patient's home medications prior to the procedure and no contraindication have been identified. I  re-evaluated the patient after the patient was positioned for the procedure in the procedure room immediately before the procedural time-out. The vital signs are current and represent the current state of the patient which has not significantly changed since the preprocedure assessment.               Date of Service: 12/02/2019    PCP: Emanuel Lopez MD    Referring Physician:                                                   PROCEDURE:  rightsacroiliac joint injection under fluoroscopy.    REASON FOR PROCEDURE: right sacroiliac joint Sacroiliitis [M46.1]  1. Sacroiliitis      POSTPROCEDURE DIAGNOSIS:   Sacroiliitis [M46.1]    1. Sacroiliitis           PHYSICIAN: Thu Penn MD  ASSISTANTS: Ca Perez DO  PGY-3  [employer] PM&R     MEDICATIONS INJECTED:  Kenalog 20mg and Bupivacaine 0.25% (1.5ml of bupivicaine per side).    LOCAL ANESTHETIC USED: Xylocaine 1% 9ml with Sodium Bicarbonate 1ml. 3ml per site.  SEDATION MEDICATIONS: None    ESTIMATED BLOOD LOSS:  None.    COMPLICATIONS:  None.    TECHNIQUE:   Laying in the prone position, the patient was prepped and draped in the usual sterile fashion using ChloraPrep and fenestrated drape.  The area was determined under fluoroscopy.  Local Xylocaine was injected by raising a wheel and going down to the periosteum using a 27-gauge hypodermic needle.  The 3.5 inch 22-gauge spinal needle was introduce into the right sacroiliac joint.  Negative pressure applied to confirm no intravascular placement.  Omnipaque was injected to confirm placement and to confirm that there was no vascular runoff.  The medication was then injected slowly.  The patient tolerated the procedure well.    PAIN BEFORE THE PROCEDURE:   6/10.    PAIN AFTER THE PROCEDURE: 4/10.    The patient was monitored for approximately 30 minutes after the procedure.  Patient was given post procedure and discharge instructions to follow at home.  We will see the patient back in two weeks or the patient may call to inform of status. The patient was discharged in a stable condition

## 2019-12-02 NOTE — DISCHARGE INSTRUCTIONS

## 2019-12-18 ENCOUNTER — TELEPHONE (OUTPATIENT)
Dept: TRANSPLANT | Facility: CLINIC | Age: 64
End: 2019-12-18

## 2019-12-18 NOTE — LETTER
December 18, 2019    Vick Gonzalez  [address] [address]          Dear Vick Gonzalez:  MRN: [id number]    Your lab work was due to be drawn on 12/16/19.  It is very important to get your labs drawn as scheduled.  We cannot monitor you for rejection, infections, or drug toxicity side effects without lab results.  Please call us at [phone] as soon as possible to let us know when you plan to have labs drawn.    Sincerely,    Kandy Herrera, RN, BSN, [facility]  Liver Transplant Coordinator  [facility]  [address] [place] [id number]  [phone]

## 2019-12-24 ENCOUNTER — TELEPHONE (OUTPATIENT)
Dept: PAIN MEDICINE | Facility: CLINIC | Age: 64
End: 2019-12-24

## 2019-12-24 NOTE — TELEPHONE ENCOUNTER
Patient was contacted and informed as per the [place] pharmacy board he last received this medication 12/05/19 and his refill is not due until 01/04/20. Patient verbalized understanding

## 2019-12-24 NOTE — TELEPHONE ENCOUNTER
Staff contacted patient and left a message on VM informing him that his 02/05/20 appt with Emma Batista will be cancelled and rescheduled due to provider being out the office. A reminder will be mailed to patient regarding his new appt

## 2019-12-24 NOTE — TELEPHONE ENCOUNTER
----- Message from Leonarda Goodwin sent at 12/24/2019 10:33 AM CST -----  Contact: VIRGIL GR  Type: RX Refill Request    Who Called: VIRGIL GR     RX Name and Strength: oxyCODONE (ROXICODONE) 15 MG Tab    Preferred Pharmacy with phone number: [facility] - [address] (close at 12)    Best Call Back Number: [phone]      Additional Information: N/A

## 2019-12-24 NOTE — TELEPHONE ENCOUNTER
Staff left a detail message informing patient of their refill request.     Staff informed pt that they just had a refill picked up on 12/05/19 and not due for the next refill until 01/04/20 and patient is required to contact the office 3 to 5 days before running out of medication(It is also stated in Pain Contract that patient signed).

## 2020-01-03 DIAGNOSIS — F11.90 CHRONIC, CONTINUOUS USE OF OPIOIDS: ICD-10-CM

## 2020-01-03 DIAGNOSIS — M43.10 ACQUIRED SPONDYLOLISTHESIS: ICD-10-CM

## 2020-01-03 DIAGNOSIS — M96.1 POSTLAMINECTOMY SYNDROME OF LUMBAR REGION: ICD-10-CM

## 2020-01-03 DIAGNOSIS — Z51.81 ENCOUNTER FOR MONITORING OPIOID MAINTENANCE THERAPY: ICD-10-CM

## 2020-01-03 DIAGNOSIS — M54.16 LUMBAR RADICULOPATHY: ICD-10-CM

## 2020-01-03 DIAGNOSIS — M51.36 DDD (DEGENERATIVE DISC DISEASE), LUMBAR: ICD-10-CM

## 2020-01-03 DIAGNOSIS — Z79.891 ENCOUNTER FOR MONITORING OPIOID MAINTENANCE THERAPY: ICD-10-CM

## 2020-01-03 DIAGNOSIS — G89.4 CHRONIC PAIN SYNDROME: ICD-10-CM

## 2020-01-03 DIAGNOSIS — G95.9 LUMBAR MYELOPATHY: ICD-10-CM

## 2020-01-03 RX ORDER — OXYCODONE HYDROCHLORIDE 15 MG/1
15 TABLET ORAL 4 TIMES DAILY PRN
Qty: 120 TABLET | Refills: 0 | Status: SHIPPED | OUTPATIENT
Start: 2020-01-03 | End: 2020-01-28 | Stop reason: SDUPTHER

## 2020-01-03 NOTE — TELEPHONE ENCOUNTER
----- Message from Juan Miguel Tavera sent at 1/3/2020  9:16 AM CST -----  Contact: Cely Gonzalez (Spouse)  Please refill the medication(s) listed below. The patient can be reached at this phone number once it is called into the pharmacy. [phone]    Medication #1: Oxycodone    Medication #2    Preferred Pharmacy: [facility] - [address]

## 2020-01-03 NOTE — TELEPHONE ENCOUNTER
Patient requesting refill on Roxicoedone 15mg  Last office visit 11.05.19   shows last refill on 12.05.19  Patient does have a pain contract on file with [facility]  Patient last UDS 02.13.19 was consistent with current therapy

## 2020-01-06 ENCOUNTER — LAB VISIT (OUTPATIENT)
Dept: LAB | Facility: HOSPITAL | Age: 65
End: 2020-01-06
Attending: INTERNAL MEDICINE
Payer: MEDICARE

## 2020-01-06 DIAGNOSIS — Z94.4 LIVER TRANSPLANTED: ICD-10-CM

## 2020-01-06 DIAGNOSIS — N18.30 CKD (CHRONIC KIDNEY DISEASE), STAGE III: ICD-10-CM

## 2020-01-06 DIAGNOSIS — Z85.05 HISTORY OF LIVER CANCER: ICD-10-CM

## 2020-01-06 DIAGNOSIS — I10 HYPERTENSION, UNSPECIFIED TYPE: ICD-10-CM

## 2020-01-06 DIAGNOSIS — E11.65 UNCONTROLLED TYPE 2 DIABETES MELLITUS WITH HYPERGLYCEMIA: ICD-10-CM

## 2020-01-06 LAB
AFP SERPL-MCNC: 4.6 NG/ML (ref 0–8.4)
ALBUMIN SERPL BCP-MCNC: 3.4 G/DL (ref 3.5–5.2)
ALP SERPL-CCNC: 93 U/L (ref 55–135)
ALT SERPL W/O P-5'-P-CCNC: 33 U/L (ref 10–44)
ANION GAP SERPL CALC-SCNC: 8 MMOL/L (ref 8–16)
AST SERPL-CCNC: 22 U/L (ref 10–40)
BASOPHILS # BLD AUTO: 0.03 K/UL (ref 0–0.2)
BASOPHILS NFR BLD: 0.3 % (ref 0–1.9)
BILIRUB SERPL-MCNC: 0.3 MG/DL (ref 0.1–1)
BUN SERPL-MCNC: 15 MG/DL (ref 8–23)
CALCIUM SERPL-MCNC: 8.8 MG/DL (ref 8.7–10.5)
CHLORIDE SERPL-SCNC: 104 MMOL/L (ref 95–110)
CO2 SERPL-SCNC: 25 MMOL/L (ref 23–29)
CREAT SERPL-MCNC: 1.3 MG/DL (ref 0.5–1.4)
DIFFERENTIAL METHOD: ABNORMAL
EOSINOPHIL # BLD AUTO: 0.6 K/UL (ref 0–0.5)
EOSINOPHIL NFR BLD: 6.3 % (ref 0–8)
ERYTHROCYTE [DISTWIDTH] IN BLOOD BY AUTOMATED COUNT: 13.6 % (ref 11.5–14.5)
EST. GFR  (AFRICAN AMERICAN): >60 ML/MIN/1.73 M^2
EST. GFR  (NON AFRICAN AMERICAN): 58.5 ML/MIN/1.73 M^2
GLUCOSE SERPL-MCNC: 219 MG/DL (ref 70–110)
HCT VFR BLD AUTO: 39.7 % (ref 40–54)
HGB BLD-MCNC: 12.5 G/DL (ref 14–18)
IMM GRANULOCYTES # BLD AUTO: 0.03 K/UL (ref 0–0.04)
IMM GRANULOCYTES NFR BLD AUTO: 0.3 % (ref 0–0.5)
LYMPHOCYTES # BLD AUTO: 3.3 K/UL (ref 1–4.8)
LYMPHOCYTES NFR BLD: 35.6 % (ref 18–48)
MCH RBC QN AUTO: 28.9 PG (ref 27–31)
MCHC RBC AUTO-ENTMCNC: 31.5 G/DL (ref 32–36)
MCV RBC AUTO: 92 FL (ref 82–98)
MONOCYTES # BLD AUTO: 0.7 K/UL (ref 0.3–1)
MONOCYTES NFR BLD: 7.3 % (ref 4–15)
NEUTROPHILS # BLD AUTO: 4.6 K/UL (ref 1.8–7.7)
NEUTROPHILS NFR BLD: 50.2 % (ref 38–73)
NRBC BLD-RTO: 0 /100 WBC
PLATELET # BLD AUTO: 154 K/UL (ref 150–350)
PMV BLD AUTO: 12.9 FL (ref 9.2–12.9)
POTASSIUM SERPL-SCNC: 4.1 MMOL/L (ref 3.5–5.1)
PROT SERPL-MCNC: 7.3 G/DL (ref 6–8.4)
RBC # BLD AUTO: 4.33 M/UL (ref 4.6–6.2)
SODIUM SERPL-SCNC: 137 MMOL/L (ref 136–145)
WBC # BLD AUTO: 9.24 K/UL (ref 3.9–12.7)

## 2020-01-06 PROCEDURE — 85025 COMPLETE CBC W/AUTO DIFF WBC: CPT

## 2020-01-06 PROCEDURE — 80053 COMPREHEN METABOLIC PANEL: CPT

## 2020-01-06 PROCEDURE — 36415 COLL VENOUS BLD VENIPUNCTURE: CPT | Mod: PO

## 2020-01-06 PROCEDURE — 82105 ALPHA-FETOPROTEIN SERUM: CPT

## 2020-01-06 PROCEDURE — 80197 ASSAY OF TACROLIMUS: CPT

## 2020-01-06 RX ORDER — GABAPENTIN 800 MG/1
TABLET ORAL
Qty: 90 TABLET | Refills: 11 | Status: SHIPPED | OUTPATIENT
Start: 2020-01-06 | End: 2020-05-06 | Stop reason: SDUPTHER

## 2020-01-07 LAB — TACROLIMUS BLD-MCNC: 4.7 NG/ML (ref 5–15)

## 2020-01-08 ENCOUNTER — TELEPHONE (OUTPATIENT)
Dept: TRANSPLANT | Facility: CLINIC | Age: 65
End: 2020-01-08

## 2020-01-08 RX ORDER — INSULIN ASPART 100 [IU]/ML
20 INJECTION, SOLUTION INTRAVENOUS; SUBCUTANEOUS
Qty: 15 ML | Refills: 11 | Status: SHIPPED | OUTPATIENT
Start: 2020-01-08 | End: 2021-02-23 | Stop reason: SDUPTHER

## 2020-01-08 NOTE — LETTER
January 8, 2020    Vick Carlos  [address] [address]          Dear Vick Gonzalez:  MRN: [id number]    This is a follow up to your recent labs, your lab results were stable.  There are no medicine changes.  Please have your labs drawn again on 4/6/2020.      If you cannot have your labs drawn on the scheduled date, it is your responsibility to call the transplant department to reschedule.  To reschedule or make an appointment, please ask to speak to or leave a message for my assistant, Madison Pearce or Laura, at [phone].  When leaving a message for Madison Pearce Angela or myself, we ask that you leave a brief message regarding your request.    Sincerely,        Your Liver Transplant Coordinator    [facility]  [address] [place] [phone]

## 2020-01-23 ENCOUNTER — HOSPITAL ENCOUNTER (INPATIENT)
Facility: HOSPITAL | Age: 65
LOS: 1 days | Discharge: HOME | DRG: 389 | End: 2020-01-24
Attending: EMERGENCY MEDICINE | Admitting: SURGERY
Payer: MEDICARE

## 2020-01-23 ENCOUNTER — PATIENT OUTREACH (OUTPATIENT)
Dept: ADMINISTRATIVE | Facility: OTHER | Age: 65
End: 2020-01-23

## 2020-01-23 DIAGNOSIS — K56.609 SBO (SMALL BOWEL OBSTRUCTION): ICD-10-CM

## 2020-01-23 DIAGNOSIS — Z94.4 S/P LIVER TRANSPLANT: ICD-10-CM

## 2020-01-23 DIAGNOSIS — R10.9 ABDOMINAL PAIN, UNSPECIFIED ABDOMINAL LOCATION: Primary | ICD-10-CM

## 2020-01-23 DIAGNOSIS — D84.9 IMMUNOSUPPRESSED STATUS: ICD-10-CM

## 2020-01-23 LAB
ALBUMIN SERPL BCP-MCNC: 3.5 G/DL (ref 3.5–5.2)
ALP SERPL-CCNC: 80 U/L (ref 55–135)
ALT SERPL W/O P-5'-P-CCNC: 34 U/L (ref 10–44)
ANION GAP SERPL CALC-SCNC: 11 MMOL/L (ref 8–16)
AST SERPL-CCNC: 33 U/L (ref 10–40)
BASOPHILS # BLD AUTO: 0 K/UL (ref 0–0.2)
BASOPHILS NFR BLD: 0 % (ref 0–1.9)
BILIRUB SERPL-MCNC: 0.3 MG/DL (ref 0.1–1)
BUN SERPL-MCNC: 22 MG/DL (ref 8–23)
CALCIUM SERPL-MCNC: 8.4 MG/DL (ref 8.7–10.5)
CHLORIDE SERPL-SCNC: 103 MMOL/L (ref 95–110)
CO2 SERPL-SCNC: 25 MMOL/L (ref 23–29)
CREAT SERPL-MCNC: 1.7 MG/DL (ref 0.5–1.4)
DIFFERENTIAL METHOD: ABNORMAL
EOSINOPHIL # BLD AUTO: 0.3 K/UL (ref 0–0.5)
EOSINOPHIL NFR BLD: 3.5 % (ref 0–8)
ERYTHROCYTE [DISTWIDTH] IN BLOOD BY AUTOMATED COUNT: 13.2 % (ref 11.5–14.5)
EST. GFR  (AFRICAN AMERICAN): 49 ML/MIN/1.73 M^2
EST. GFR  (NON AFRICAN AMERICAN): 42 ML/MIN/1.73 M^2
ESTIMATED AVG GLUCOSE: 203 MG/DL (ref 68–131)
GLUCOSE SERPL-MCNC: 252 MG/DL (ref 70–110)
HBA1C MFR BLD HPLC: 8.7 % (ref 4–5.6)
HCT VFR BLD AUTO: 42.4 % (ref 40–54)
HGB BLD-MCNC: 13.7 G/DL (ref 14–18)
LACTATE SERPL-SCNC: 1.4 MMOL/L (ref 0.5–2.2)
LYMPHOCYTES # BLD AUTO: 1.7 K/UL (ref 1–4.8)
LYMPHOCYTES NFR BLD: 21.9 % (ref 18–48)
MAGNESIUM SERPL-MCNC: 1.6 MG/DL (ref 1.6–2.6)
MCH RBC QN AUTO: 28.3 PG (ref 27–31)
MCHC RBC AUTO-ENTMCNC: 32.3 G/DL (ref 32–36)
MCV RBC AUTO: 88 FL (ref 82–98)
MONOCYTES # BLD AUTO: 0.5 K/UL (ref 0.3–1)
MONOCYTES NFR BLD: 6.5 % (ref 4–15)
NEUTROPHILS # BLD AUTO: 5.2 K/UL (ref 1.8–7.7)
NEUTROPHILS NFR BLD: 68.1 % (ref 38–73)
PHOSPHATE SERPL-MCNC: 2.5 MG/DL (ref 2.7–4.5)
PLATELET # BLD AUTO: 166 K/UL (ref 150–350)
PMV BLD AUTO: 12.7 FL (ref 9.2–12.9)
POCT GLUCOSE: 157 MG/DL (ref 70–110)
POCT GLUCOSE: 170 MG/DL (ref 70–110)
POCT GLUCOSE: 256 MG/DL (ref 70–110)
POCT GLUCOSE: 274 MG/DL (ref 70–110)
POTASSIUM SERPL-SCNC: 3.9 MMOL/L (ref 3.5–5.1)
PROT SERPL-MCNC: 7.1 G/DL (ref 6–8.4)
RBC # BLD AUTO: 4.84 M/UL (ref 4.6–6.2)
SODIUM SERPL-SCNC: 139 MMOL/L (ref 136–145)
WBC # BLD AUTO: 7.68 K/UL (ref 3.9–12.7)

## 2020-01-23 PROCEDURE — 11000001 HC ACUTE MED/SURG PRIVATE ROOM

## 2020-01-23 PROCEDURE — 82962 GLUCOSE BLOOD TEST: CPT | Mod: 91

## 2020-01-23 PROCEDURE — 96374 THER/PROPH/DIAG INJ IV PUSH: CPT | Mod: 59

## 2020-01-23 PROCEDURE — 83735 ASSAY OF MAGNESIUM: CPT

## 2020-01-23 PROCEDURE — 96376 TX/PRO/DX INJ SAME DRUG ADON: CPT

## 2020-01-23 PROCEDURE — 99285 EMERGENCY DEPT VISIT HI MDM: CPT | Mod: 25

## 2020-01-23 PROCEDURE — 25500020 PHARM REV CODE 255: Performed by: EMERGENCY MEDICINE

## 2020-01-23 PROCEDURE — 80053 COMPREHEN METABOLIC PANEL: CPT

## 2020-01-23 PROCEDURE — 63600175 PHARM REV CODE 636 W HCPCS: Performed by: STUDENT IN AN ORGANIZED HEALTH CARE EDUCATION/TRAINING PROGRAM

## 2020-01-23 PROCEDURE — 63600175 PHARM REV CODE 636 W HCPCS: Performed by: EMERGENCY MEDICINE

## 2020-01-23 PROCEDURE — 83036 HEMOGLOBIN GLYCOSYLATED A1C: CPT

## 2020-01-23 PROCEDURE — 84100 ASSAY OF PHOSPHORUS: CPT

## 2020-01-23 PROCEDURE — 83605 ASSAY OF LACTIC ACID: CPT

## 2020-01-23 PROCEDURE — 85025 COMPLETE CBC W/AUTO DIFF WBC: CPT

## 2020-01-23 RX ORDER — MORPHINE SULFATE 2 MG/ML
2 INJECTION, SOLUTION INTRAMUSCULAR; INTRAVENOUS EVERY 4 HOURS PRN
Status: DISCONTINUED | OUTPATIENT
Start: 2020-01-23 | End: 2020-01-24 | Stop reason: HOSPADM

## 2020-01-23 RX ORDER — HEPARIN SODIUM 5000 [USP'U]/ML
5000 INJECTION, SOLUTION INTRAVENOUS; SUBCUTANEOUS EVERY 8 HOURS
Status: DISCONTINUED | OUTPATIENT
Start: 2020-01-23 | End: 2020-01-24 | Stop reason: HOSPADM

## 2020-01-23 RX ORDER — TACROLIMUS 1 MG/1
1 CAPSULE ORAL EVERY MORNING
Status: DISCONTINUED | OUTPATIENT
Start: 2020-01-24 | End: 2020-01-24 | Stop reason: HOSPADM

## 2020-01-23 RX ORDER — INSULIN ASPART 100 [IU]/ML
1-10 INJECTION, SOLUTION INTRAVENOUS; SUBCUTANEOUS EVERY 6 HOURS PRN
Status: DISCONTINUED | OUTPATIENT
Start: 2020-01-23 | End: 2020-01-24 | Stop reason: HOSPADM

## 2020-01-23 RX ORDER — LEVOTHYROXINE SODIUM ANHYDROUS 100 UG/5ML
50 INJECTION, POWDER, LYOPHILIZED, FOR SOLUTION INTRAVENOUS DAILY
Status: DISCONTINUED | OUTPATIENT
Start: 2020-01-24 | End: 2020-01-24 | Stop reason: HOSPADM

## 2020-01-23 RX ORDER — AMITRIPTYLINE HYDROCHLORIDE 25 MG/1
50 TABLET, FILM COATED ORAL NIGHTLY
Status: DISCONTINUED | OUTPATIENT
Start: 2020-01-23 | End: 2020-01-24 | Stop reason: HOSPADM

## 2020-01-23 RX ORDER — MORPHINE SULFATE 4 MG/ML
4 INJECTION, SOLUTION INTRAMUSCULAR; INTRAVENOUS
Status: COMPLETED | OUTPATIENT
Start: 2020-01-23 | End: 2020-01-23

## 2020-01-23 RX ORDER — SODIUM CHLORIDE 9 MG/ML
INJECTION, SOLUTION INTRAVENOUS CONTINUOUS
Status: DISCONTINUED | OUTPATIENT
Start: 2020-01-23 | End: 2020-01-24 | Stop reason: HOSPADM

## 2020-01-23 RX ORDER — METOPROLOL SUCCINATE 50 MG/1
200 TABLET, EXTENDED RELEASE ORAL DAILY
Status: DISCONTINUED | OUTPATIENT
Start: 2020-01-24 | End: 2020-01-24 | Stop reason: HOSPADM

## 2020-01-23 RX ORDER — GLUCAGON 1 MG
1 KIT INJECTION
Status: DISCONTINUED | OUTPATIENT
Start: 2020-01-23 | End: 2020-01-24 | Stop reason: HOSPADM

## 2020-01-23 RX ORDER — TACROLIMUS 1 MG/1
2 CAPSULE ORAL EVERY MORNING
Status: DISCONTINUED | OUTPATIENT
Start: 2020-01-24 | End: 2020-01-24 | Stop reason: HOSPADM

## 2020-01-23 RX ORDER — SODIUM CHLORIDE 0.9 % (FLUSH) 0.9 %
10 SYRINGE (ML) INJECTION
Status: DISCONTINUED | OUTPATIENT
Start: 2020-01-23 | End: 2020-01-24 | Stop reason: HOSPADM

## 2020-01-23 RX ORDER — LIDOCAINE HYDROCHLORIDE 10 MG/ML
1 INJECTION, SOLUTION EPIDURAL; INFILTRATION; INTRACAUDAL; PERINEURAL ONCE
Status: DISCONTINUED | OUTPATIENT
Start: 2020-01-23 | End: 2020-01-24 | Stop reason: HOSPADM

## 2020-01-23 RX ORDER — NIFEDIPINE 30 MG/1
60 TABLET, EXTENDED RELEASE ORAL DAILY
Status: DISCONTINUED | OUTPATIENT
Start: 2020-01-24 | End: 2020-01-24 | Stop reason: HOSPADM

## 2020-01-23 RX ORDER — ONDANSETRON 2 MG/ML
4 INJECTION INTRAMUSCULAR; INTRAVENOUS EVERY 6 HOURS PRN
Status: DISCONTINUED | OUTPATIENT
Start: 2020-01-23 | End: 2020-01-24 | Stop reason: HOSPADM

## 2020-01-23 RX ORDER — ACETAMINOPHEN 325 MG/1
650 TABLET ORAL EVERY 4 HOURS PRN
Status: DISCONTINUED | OUTPATIENT
Start: 2020-01-23 | End: 2020-01-24 | Stop reason: HOSPADM

## 2020-01-23 RX ORDER — LISINOPRIL 20 MG/1
40 TABLET ORAL DAILY
Status: DISCONTINUED | OUTPATIENT
Start: 2020-01-24 | End: 2020-01-24 | Stop reason: HOSPADM

## 2020-01-23 RX ADMIN — MORPHINE SULFATE 4 MG: 4 INJECTION INTRAVENOUS at 06:01

## 2020-01-23 RX ADMIN — HEPARIN SODIUM 5000 UNITS: 5000 INJECTION, SOLUTION INTRAVENOUS; SUBCUTANEOUS at 11:01

## 2020-01-23 RX ADMIN — MORPHINE SULFATE 4 MG: 4 INJECTION INTRAVENOUS at 07:01

## 2020-01-23 RX ADMIN — IOHEXOL 100 ML: 350 INJECTION, SOLUTION INTRAVENOUS at 05:01

## 2020-01-23 RX ADMIN — MORPHINE SULFATE 4 MG: 4 INJECTION INTRAVENOUS at 08:01

## 2020-01-23 RX ADMIN — MORPHINE SULFATE 2 MG: 2 INJECTION, SOLUTION INTRAMUSCULAR; INTRAVENOUS at 11:01

## 2020-01-23 RX ADMIN — MORPHINE SULFATE 4 MG: 4 INJECTION INTRAVENOUS at 03:01

## 2020-01-23 RX ADMIN — SODIUM CHLORIDE: 0.9 INJECTION, SOLUTION INTRAVENOUS at 11:01

## 2020-01-23 NOTE — ED NOTES
Physician at bedside for assessment. Pt reports that he noticed that his abd was having significant swelling and pain that started this morning. Pt reports that he was helping his sister move yesterday and was lifting heavy objects. Pt states that he was able to eat a small amount at breakfast this morning but stopped mid meal because he began feeling nauseated. Pt has hx of liver transplant in 2009. Pt appears to be uncomfortable and states that he's in pain. Awaiting orders for pain medication.

## 2020-01-24 ENCOUNTER — TELEPHONE (OUTPATIENT)
Dept: FAMILY MEDICINE | Facility: CLINIC | Age: 65
End: 2020-01-24

## 2020-01-24 VITALS
SYSTOLIC BLOOD PRESSURE: 118 MMHG | WEIGHT: 281.5 LBS | OXYGEN SATURATION: 93 % | TEMPERATURE: 97 F | DIASTOLIC BLOOD PRESSURE: 58 MMHG | RESPIRATION RATE: 20 BRPM | HEIGHT: 73 IN | HEART RATE: 94 BPM | BODY MASS INDEX: 37.31 KG/M2

## 2020-01-24 LAB
ALBUMIN SERPL BCP-MCNC: 3.6 G/DL (ref 3.5–5.2)
ALP SERPL-CCNC: 69 U/L (ref 55–135)
ALT SERPL W/O P-5'-P-CCNC: 33 U/L (ref 10–44)
ANION GAP SERPL CALC-SCNC: 9 MMOL/L (ref 8–16)
AST SERPL-CCNC: 30 U/L (ref 10–40)
BASOPHILS # BLD AUTO: 0.01 K/UL (ref 0–0.2)
BASOPHILS NFR BLD: 0.1 % (ref 0–1.9)
BILIRUB SERPL-MCNC: 0.5 MG/DL (ref 0.1–1)
BUN SERPL-MCNC: 17 MG/DL (ref 8–23)
CALCIUM SERPL-MCNC: 8.6 MG/DL (ref 8.7–10.5)
CHLORIDE SERPL-SCNC: 106 MMOL/L (ref 95–110)
CO2 SERPL-SCNC: 26 MMOL/L (ref 23–29)
CREAT SERPL-MCNC: 1.4 MG/DL (ref 0.5–1.4)
DIFFERENTIAL METHOD: ABNORMAL
EOSINOPHIL # BLD AUTO: 0.4 K/UL (ref 0–0.5)
EOSINOPHIL NFR BLD: 5.4 % (ref 0–8)
ERYTHROCYTE [DISTWIDTH] IN BLOOD BY AUTOMATED COUNT: 13.4 % (ref 11.5–14.5)
EST. GFR  (AFRICAN AMERICAN): >60 ML/MIN/1.73 M^2
EST. GFR  (NON AFRICAN AMERICAN): 53 ML/MIN/1.73 M^2
GLUCOSE SERPL-MCNC: 194 MG/DL (ref 70–110)
HCT VFR BLD AUTO: 40.7 % (ref 40–54)
HGB BLD-MCNC: 12.9 G/DL (ref 14–18)
LYMPHOCYTES # BLD AUTO: 2.6 K/UL (ref 1–4.8)
LYMPHOCYTES NFR BLD: 32 % (ref 18–48)
MAGNESIUM SERPL-MCNC: 1.8 MG/DL (ref 1.6–2.6)
MCH RBC QN AUTO: 27.9 PG (ref 27–31)
MCHC RBC AUTO-ENTMCNC: 31.7 G/DL (ref 32–36)
MCV RBC AUTO: 88 FL (ref 82–98)
MONOCYTES # BLD AUTO: 0.4 K/UL (ref 0.3–1)
MONOCYTES NFR BLD: 4.9 % (ref 4–15)
NEUTROPHILS # BLD AUTO: 4.7 K/UL (ref 1.8–7.7)
NEUTROPHILS NFR BLD: 57.6 % (ref 38–73)
PHOSPHATE SERPL-MCNC: 2.6 MG/DL (ref 2.7–4.5)
PLATELET # BLD AUTO: 171 K/UL (ref 150–350)
PMV BLD AUTO: 12.6 FL (ref 9.2–12.9)
POCT GLUCOSE: 195 MG/DL (ref 70–110)
POCT GLUCOSE: 279 MG/DL (ref 70–110)
POTASSIUM SERPL-SCNC: 4.4 MMOL/L (ref 3.5–5.1)
PROT SERPL-MCNC: 7.1 G/DL (ref 6–8.4)
RBC # BLD AUTO: 4.62 M/UL (ref 4.6–6.2)
SODIUM SERPL-SCNC: 141 MMOL/L (ref 136–145)
WBC # BLD AUTO: 8.2 K/UL (ref 3.9–12.7)

## 2020-01-24 PROCEDURE — 63600175 PHARM REV CODE 636 W HCPCS: Performed by: STUDENT IN AN ORGANIZED HEALTH CARE EDUCATION/TRAINING PROGRAM

## 2020-01-24 PROCEDURE — 25500020 PHARM REV CODE 255: Performed by: STUDENT IN AN ORGANIZED HEALTH CARE EDUCATION/TRAINING PROGRAM

## 2020-01-24 PROCEDURE — 80053 COMPREHEN METABOLIC PANEL: CPT

## 2020-01-24 PROCEDURE — 85025 COMPLETE CBC W/AUTO DIFF WBC: CPT

## 2020-01-24 PROCEDURE — 25000003 PHARM REV CODE 250: Performed by: STUDENT IN AN ORGANIZED HEALTH CARE EDUCATION/TRAINING PROGRAM

## 2020-01-24 PROCEDURE — 83735 ASSAY OF MAGNESIUM: CPT

## 2020-01-24 PROCEDURE — 36415 COLL VENOUS BLD VENIPUNCTURE: CPT

## 2020-01-24 PROCEDURE — 84100 ASSAY OF PHOSPHORUS: CPT

## 2020-01-24 RX ADMIN — MORPHINE SULFATE 2 MG: 2 INJECTION, SOLUTION INTRAMUSCULAR; INTRAVENOUS at 03:01

## 2020-01-24 RX ADMIN — TACROLIMUS 1 MG: 1 CAPSULE ORAL at 02:01

## 2020-01-24 RX ADMIN — MORPHINE SULFATE 2 MG: 2 INJECTION, SOLUTION INTRAMUSCULAR; INTRAVENOUS at 01:01

## 2020-01-24 RX ADMIN — INSULIN ASPART 1 UNITS: 100 INJECTION, SOLUTION INTRAVENOUS; SUBCUTANEOUS at 12:01

## 2020-01-24 RX ADMIN — HEPARIN SODIUM 5000 UNITS: 5000 INJECTION, SOLUTION INTRAVENOUS; SUBCUTANEOUS at 05:01

## 2020-01-24 RX ADMIN — INSULIN ASPART 2 UNITS: 100 INJECTION, SOLUTION INTRAVENOUS; SUBCUTANEOUS at 05:01

## 2020-01-24 RX ADMIN — MORPHINE SULFATE 2 MG: 2 INJECTION, SOLUTION INTRAMUSCULAR; INTRAVENOUS at 08:01

## 2020-01-24 RX ADMIN — SODIUM CHLORIDE: 0.9 INJECTION, SOLUTION INTRAVENOUS at 05:01

## 2020-01-24 RX ADMIN — NIFEDIPINE 60 MG: 30 TABLET, FILM COATED, EXTENDED RELEASE ORAL at 08:01

## 2020-01-24 RX ADMIN — INSULIN ASPART 6 UNITS: 100 INJECTION, SOLUTION INTRAVENOUS; SUBCUTANEOUS at 12:01

## 2020-01-24 RX ADMIN — LEVOTHYROXINE SODIUM ANHYDROUS 50 MCG: 100 INJECTION, POWDER, LYOPHILIZED, FOR SOLUTION INTRAVENOUS at 08:01

## 2020-01-24 RX ADMIN — TACROLIMUS 2 MG: 1 CAPSULE ORAL at 08:01

## 2020-01-24 RX ADMIN — METOPROLOL SUCCINATE 200 MG: 50 TABLET, EXTENDED RELEASE ORAL at 08:01

## 2020-01-24 RX ADMIN — HEPARIN SODIUM 5000 UNITS: 5000 INJECTION, SOLUTION INTRAVENOUS; SUBCUTANEOUS at 01:01

## 2020-01-24 RX ADMIN — LISINOPRIL 40 MG: 20 TABLET ORAL at 08:01

## 2020-01-24 RX ADMIN — DIATRIZOATE MEGLUMINE AND DIATRIZOATE SODIUM 200 ML: 660; 100 LIQUID ORAL; RECTAL at 02:01

## 2020-01-24 NOTE — PLAN OF CARE
Patient is AAO X 4. Denies. Vital signs stable. Denies pain at present. Remains NPO. IV Fluids in progress. NGT is connected to CLWS. 100 ml drained brown thick fluid. Voided freely. Safety measures maintained. SCD's connected and in progress. Will continue to monitor patient. Patient had KUB 2 hours after Gastrografin ingestion at 4:30 am. Had a bowel movement just few minutes ago. Will continue to monitor patient.

## 2020-01-24 NOTE — TELEPHONE ENCOUNTER
----- Message from Bernard Lopez RN sent at 1/24/2020  3:42 PM CST -----  Please place patient on waiting list for an earlier follow up appointment if possible.     Thanks,    Bernard Lopez RN   [facility]  [phone]

## 2020-01-24 NOTE — NURSING
Discharge hand out explained and given to the pt.  piv discontinued. Pt waiting for transport for discharge.

## 2020-01-24 NOTE — H&P
Neuroendocrine and General Surgery  CONSULT / H&P NOTE  Date: 1/24/2020    CC:  Abdominal pain    HPI:   Vick Gonzalez is a 64 y.o. male hx of obesity, chronic pain, CKD (baseline Cr 1.2-1.4) , DM, hypothyroid, hepC s/p treatment, gout, etoh abuse, chronic back pain s/p laminectomy, HTN, and PAD s/p liver transplant in 2010 (currently takes prograf) presenting with n/v and abdominal pain that started yesterday morning.  Patient reports his last bowel movement was day before yesterday described as nonbloody and nonmelanotic.    Interval events:  NG tube placed with 100 cc of bilious immediately. Gastroview placed down NG tube.  To sigmoid colon.  Patient had 2 large bowel movements this morning and pain and abdominal extension are improved.  Patient reports he is not currently passing gas but feels much better.  Creatinine on a.m. labs after hydration with IV fluids  A1c 8.7, lactate within normal limits    PMH:   Past Medical History:   Diagnosis Date    Anemia     Anxiety     Chronic pain syndrome 7/13/2011    CKD (chronic kidney disease), stage III     Diabetes mellitus type II, uncontrolled     Discitis of lumbosacral region 1/16/2015    ED (erectile dysfunction)     Encounter for blood transfusion     Genital herpes     Gout, arthritis     History of alcohol abuse     History of hepatitis C, s/p successful Rx w/ SVR24 (cure) - 5/2018 8/23/2011    Completed 24wks Epclusa + RBV w/SVR12 - 2/2018  -     History of positive PPD, treatment status unknown     Pulmonary granulomas, negative sputum cultures for AFB and indeterminate quantferon test    History of substance abuse     Hypertension     Hypothyroidism     Liver replaced by transplant 8/23/2011    DATE: 12/16/2013  LIVER BIOPSY : REASON:  hep C staging  PATHOLOGY COMMITTEE NOTE/PLAN: :grade  1 / stage 1        Pancreatitis 2016    Peptic ulcer disease          PSH:   Past Surgical History:   Procedure Laterality Date    CHOLECYSTECTOMY       INJECTION OF JOINT Right 12/2/2019    Procedure: INJECTION, JOINT SI;  Surgeon: Thu Penn MD;  Location: [facility];  Service: Pain Management;  Laterality: Right;  RT SI JNT INJ    LIVER TRANSPLANT  06/2010    SPINE SURGERY         FamHx:   Family History   Problem Relation Age of Onset    Cancer Mother     Diabetes Mother     Heart disease Mother     Diabetes Sister     Cancer Maternal Uncle 82        colon CA    Melanoma Neg Hx     Psoriasis Neg Hx     Lupus Neg Hx     Eczema Neg Hx     Acne Neg Hx          SocHx:    Now retired, previously worked as a     Social History     Socioeconomic History    Marital status:      Spouse name: Not on file    Number of children: Not on file    Years of education: Not on file    Highest education level: Not on file   Occupational History    Not on file   Social Needs    Financial resource strain: Not on file    Food insecurity:     Worry: Not on file     Inability: Not on file    Transportation needs:     Medical: Not on file     Non-medical: Not on file   Tobacco Use    Smoking status: Former Smoker    Smokeless tobacco: Never Used   Substance and Sexual Activity    Alcohol use: No     Comment: over 5 years ago, none currently    Drug use: No    Sexual activity: Not on file   Lifestyle    Physical activity:     Days per week: Not on file     Minutes per session: Not on file    Stress: Not on file   Relationships    Social connections:     Talks on phone: Not on file     Gets together: Not on file     Attends Rastafari service: Not on file     Active member of club or organization: Not on file     Attends meetings of clubs or organizations: Not on file     Relationship status: Not on file   Other Topics Concern    Not on file   Social History Narrative    Not on file         Medications:  No current facility-administered medications on file prior to encounter.      Current Outpatient Medications on File Prior to Encounter  "  Medication Sig Dispense Refill    amitriptyline (ELAVIL) 50 MG tablet Take 1 tablet (50 mg total) by mouth every evening. For nerve pain and sleep 30 tablet 11    BD ULTRA-FINE MENDY PEN NEEDLES 32 gauge x 5/32" Ndle       blood sugar diagnostic Strp 1 strip by Misc.(Non-Drug; Combo Route) route 3 (three) times daily. 100 strip 11    calcium carbonate-vitamin D3 (CALTRATE 600 + D) 600 mg(1,500mg) -400 unit Chew       dulaglutide (TRULICITY) 1.5 mg/0.5 mL PnIj Inject 1.5 mg (1 syringe) into the skin every 7 days. 2 mL 11    gabapentin (NEURONTIN) 800 MG tablet TAKE 1 TABLET BY MOUTH THREE TIMES A DAY 90 tablet 11    insulin aspart U-100 (NOVOLOG FLEXPEN U-100 INSULIN) 100 unit/mL (3 mL) InPn pen Inject 20 Units into the skin 3 (three) times daily with meals. 15 mL 11    insulin detemir U-100 (LEVEMIR FLEXTOUCH) 100 unit/mL (3 mL) SubQ InPn pen Inject 70 Units into the skin every evening. 15 mL 11    lancets Misc 1 each by Misc.(Non-Drug; Combo Route) route 4 (four) times daily before meals and nightly. 200 each 11    levothyroxine (SYNTHROID) 88 MCG tablet TAKE 1 TABLET (88 MCG TOTAL) BY MOUTH ONCE DAILY. 90 tablet 4    lisinopril (PRINIVIL,ZESTRIL) 40 MG tablet Take 1 tablet (40 mg total) by mouth once daily. 90 tablet 3    metoprolol succinate (TOPROL-XL) 200 MG 24 hr tablet TAKE 1 TABLET (200 MG TOTAL) BY MOUTH ONCE DAILY. 30 tablet 11    multivitamin (THERAGRAN) per tablet Take 1 tablet by mouth.      NIFEdipine (ADALAT CC) 60 MG TbSR TAKE 1 TABLET (60 MG TOTAL) BY MOUTH ONCE DAILY. 90 tablet 1    NOVOFINE PLUS 32 gauge x 1/6" Ndle       ondansetron (ZOFRAN-ODT) 4 MG TbDL Take 1 tablet (4 mg total) by mouth every 8 (eight) hours as needed (n/v). 10 tablet 0    oxyCODONE (ROXICODONE) 15 MG Tab Take 1 tablet (15 mg total) by mouth 4 (four) times daily as needed for Pain. 120 tablet 0    pantoprazole (PROTONIX) 20 MG tablet Take 1 tablet (20 mg total) by mouth once daily. 30 tablet 0    pen " "needle, diabetic (BD ULTRA-FINE MENDY PEN NEEDLE) 32 gauge x 5/32" Ndle TEST WITH NOVOLOG THREE TIMES A DAY WITH MEALS AND WITH LEVEMIR AT BEDTIME 100 each 11    sertraline (ZOLOFT) 100 MG tablet TAKE 1 TABLET (100 MG TOTAL) BY MOUTH ONCE DAILY. 30 tablet 7    tacrolimus (PROGRAF) 1 MG Cap TAKE 2 CAPSULES (2 MG TOTAL) BY MOUTH IN THE MORNING & TAKE 1 CAPSULE (1 MG TOTAL) IN THE EVENING 90 capsule 11    TRUE METRIX GLUCOSE METER Misc TEST 4 (FOUR) TIMES DAILY BEFORE MEALS AND NIGHTLY. 1 each 0    allopurinol (ZYLOPRIM) 100 MG tablet TAKE 1 TABLET BY MOUTH TWICE A DAY 60 tablet 7    colchicine 0.6 mg tablet Take 1 tablet (0.6 mg total) by mouth 2 (two) times daily. 30 tablet 2    rosuvastatin (CRESTOR) 5 MG tablet Take 1 tablet (5 mg total) by mouth once daily. 90 tablet 3    sildenafil (REVATIO) 20 mg Tab Take 5 by mouth 1 hour before sexual activity 25 tablet 11       Allergies:   Review of patient's allergies indicates:  No Known Allergies    ROS: 10 points reviewed, negative except for that stated in HPI.      Physical Exam:    VITAL SIGNS: 24 HR MIN & MAX LAST    Temp  Min: 97.3 °F (36.3 °C)  Max: 97.8 °F (36.6 °C)  97.5 °F (36.4 °C)        BP  Min: 103/66  Max: 159/91  (!) 156/88     Pulse  Min: 81  Max: 92  87     Resp  Min: 14  Max: 24  16    SpO2  Min: 92 %  Max: 97 %  (!) 92 %      HT: 6' 1" (185.4 cm)  WT: 127.7 kg (281 lb 8.4 oz)  BMI: 37.2       Gen: No acute distress, Resting comfortably in bed, Pleasant  Neuro: Alert and oriented to person, place, and time  Psych: Appropriate mood and affect  HEENT: NCAT, EOMI   CV:  Not tachycardic  Resp:  No increased work of breathing  Abd:  Soft, moderately distended most likely secondary to obesity, nontender, palpable 3x4cm area of scar tissue in LLQ does not feel like hernia  : No [place]   MSK: Moves all extremities, 2+ DP pulses bilaterally  Skin: Warm, dry     Results  Recent Labs   Lab 01/23/20  1546 01/24/20  0654   WBC 7.68 8.20   HGB 13.7* 12.9* "   HCT 42.4 40.7    171    141   CO2 25 26   BUN 22 17   CREATININE 1.7* 1.4   CALCIUM 8.4* 8.6*   MG 1.6 1.8   PHOS 2.5* 2.6*   ALKPHOS 80 69         A/P:   64 y.o. male w/ hx of liver transplant presenting w/ SBO vs constipation. Symptoms have greatly improved w/ gastrograffin contrast down NG. Had 2 large bowel movements and pain improved.  -clamp NG, if tolerated remove NG then clears and advance as tolerated  -dc later today      José Miguel Ibarra MD  [employer] General Surgery

## 2020-01-24 NOTE — PLAN OF CARE
Problem: Fall Injury Risk  Goal: Absence of Fall and Fall-Related Injury  Outcome: Ongoing, Progressing     Problem: Adult Inpatient Plan of Care  Goal: Plan of Care Review  Outcome: Ongoing, Progressing  Goal: Patient-Specific Goal (Individualization)  Outcome: Ongoing, Progressing  Goal: Absence of Hospital-Acquired Illness or Injury  Outcome: Ongoing, Progressing  Goal: Optimal Comfort and Wellbeing  Outcome: Ongoing, Progressing  Goal: Readiness for Transition of Care  Outcome: Ongoing, Progressing  Goal: Rounds/Family Conference  Outcome: Ongoing, Progressing     Problem: Diabetes Comorbidity  Goal: Blood Glucose Level Within Desired Range  Outcome: Ongoing, Progressing     Problem: Pain Acute  Goal: Optimal Pain Control  Outcome: Ongoing, Progressing     Problem: Nausea and Vomiting  Goal: Fluid and Electrolyte Balance  Outcome: Ongoing, Progressing     Problem: Fluid Deficit (Intestinal Obstruction)  Goal: Fluid Balance  Outcome: Ongoing, Progressing     Problem: Infection (Intestinal Obstruction)  Goal: Absence of Infection Signs/Symptoms  Outcome: Ongoing, Progressing     Problem: Nausea and Vomiting (Intestinal Obstruction)  Goal: Nausea and Vomiting Relief  Outcome: Ongoing, Progressing     Problem: Pain (Intestinal Obstruction)  Goal: Acceptable Pain Control  Outcome: Ongoing, Progressing

## 2020-01-24 NOTE — PLAN OF CARE
TN met with pt for discharge planning. Pt lives with wife who will be his help at home.  Pt independent with ADLs with use of a cane. No HH noted. Pt's wife to provide transportation on discharge. Pt voiced no discharge needs at this time.       01/24/20 1505   Discharge Assessment   Assessment Type Discharge Planning Assessment   Confirmed/corrected address and phone number on facesheet? Yes   Assessment information obtained from? Patient   Expected Length of Stay (days) 1   Communicated expected length of stay with patient/caregiver yes   Prior to hospitilization cognitive status: Alert/Oriented   Prior to hospitalization functional status: Assistive Equipment   Current cognitive status: Alert/Oriented   Current Functional Status: Assistive Equipment   Lives With spouse   Able to Return to Prior Arrangements yes   Is patient able to care for self after discharge? Yes   Who are your caregiver(s) and their phone number(s)? Cely Gonzalez (wife) [phone]   Patient's perception of discharge disposition home or selfcare   Readmission Within the Last 30 Days no previous admission in last 30 days   Patient currently being followed by outpatient case management? No   Patient currently receives home health services? No   Patient currently receives any other outside agency services? No   Equipment Currently Used at Home cane, quad   Do you have any problems affording any of your prescribed medications? TBD   Is the patient taking medications as prescribed? yes   Does the patient have transportation home? Yes   Transportation Anticipated family or friend will provide   Does the patient receive services at the [facility] Clinic? No   Discharge Plan A Home;Home with family   Discharge Plan B Home;Home with family   DME Needed Upon Discharge  none   Patient/Family in Agreement with Plan yes

## 2020-01-24 NOTE — NURSING
[facility] ED around 10:30 pm. Wife with him. Warm welcome given to both of them and introduced myself as his bedside nurse to night. All initial weight and vital signs taken and are within normal limits. NGT connected to continuous low wall suction. Plan of care reviewed with him. Pain score assessed. 6/10 Will be administering IV Morphine soon.  Bed alarms have been activated. Call bell explained. Will continue to monitor patient.

## 2020-01-24 NOTE — DISCHARGE SUMMARY
[facility]  General Surgery  Discharge Summary      Patient Name: Vick Gonzalez  MRN: [id number]  Admission Date: 1/23/2020  Hospital Length of Stay: 1 days  Discharge Date and Time:  01/24/2020 2:38 PM  Attending Physician: DEBBY Cueva MD   Discharging Provider: José Miguel Ibarra MD  Primary Care Provider: Emanuel Lopez MD    Consults: none    Significant Diagnostic Studies: CT scan with partial SBO    Hospital course: presented to ED with n/v + abd pain. Hx of liver transplant. Admitted for observation, ng placed. Had 2 large bowel movements after contrast study down NG. Abd pain and distention improved. Tolerated regular diet and was discharged.     Pending Diagnostic Studies:     None        Final Active Diagnoses:    Diagnosis Date Noted POA    PRINCIPAL PROBLEM:  SBO (small bowel obstruction) [K56.609]  Yes    Abdominal pain [R10.9] 01/23/2020 Yes    Immunosuppressed status [D89.9] 10/16/2017 Yes    S/P liver transplant [Z94.4] 10/04/2016 Not Applicable      Problems Resolved During this Admission:      Discharged Condition: stable    Disposition: Home or Self Care    Follow Up:    Patient Instructions:      Diet diabetic     Notify your health care provider if you experience any of the following:  increased confusion or weakness     Notify your health care provider if you experience any of the following:  persistent dizziness, light-headedness, or visual disturbances     Notify your health care provider if you experience any of the following:  worsening rash     Notify your health care provider if you experience any of the following:  severe persistent headache     Notify your health care provider if you experience any of the following:  difficulty breathing or increased cough     Notify your health care provider if you experience any of the following:  redness, tenderness, or signs of infection (pain, swelling, redness, odor or green/yellow discharge around incision site)      Notify your health care provider if you experience any of the following:  severe uncontrolled pain     Notify your health care provider if you experience any of the following:  persistent nausea and vomiting or diarrhea     Notify your health care provider if you experience any of the following:  temperature >100.4     Notify your health care provider if you experience any of the following:   Order Comments: Drink at least 2 bottles of water today, make sure to get up and move around. Take miralax powder daily and take an over the counter stool softener daily. These symptoms are all most likely due to constipation.  Follow up with PCP next week regarding your constipation, recent hospital admission, and elevated hemoglobin a1C.     Activity as tolerated     Medications:  Reconciled Home Medications:      Medication List      CONTINUE taking these medications    allopurinol 100 MG tablet  Commonly known as:  ZYLOPRIM  TAKE 1 TABLET BY MOUTH TWICE A DAY     amitriptyline 50 MG tablet  Commonly known as:  ELAVIL  Take 1 tablet (50 mg total) by mouth every evening. For nerve pain and sleep     blood sugar diagnostic Strp  1 strip by Misc.(Non-Drug; Combo Route) route 3 (three) times daily.     Caltrate 600 plus D 600 mg(1,500mg) -400 unit Chew  Generic drug:  calcium carbonate-vitamin D3     colchicine 0.6 mg tablet  Commonly known as:  COLCRYS  Take 1 tablet (0.6 mg total) by mouth 2 (two) times daily.     gabapentin 800 MG tablet  Commonly known as:  NEURONTIN  TAKE 1 TABLET BY MOUTH THREE TIMES A DAY     insulin aspart U-100 100 unit/mL (3 mL) Inpn pen  Commonly known as:  NovoLOG Flexpen U-100 Insulin  Inject 20 Units into the skin 3 (three) times daily with meals.     lancets Misc  1 each by Misc.(Non-Drug; Combo Route) route 4 (four) times daily before meals and nightly.     Levemir FlexTouch U-100 Insuln 100 unit/mL (3 mL) Inpn pen  Generic drug:  insulin detemir U-100  Inject 70 Units into the skin every  "evening.     levothyroxine 88 MCG tablet  Commonly known as:  SYNTHROID  TAKE 1 TABLET (88 MCG TOTAL) BY MOUTH ONCE DAILY.     lisinopril 40 MG tablet  Commonly known as:  PRINIVIL,ZESTRIL  Take 1 tablet (40 mg total) by mouth once daily.     metoprolol succinate 200 MG 24 hr tablet  Commonly known as:  TOPROL-XL  TAKE 1 TABLET (200 MG TOTAL) BY MOUTH ONCE DAILY.     multivitamin per tablet  Commonly known as:  THERAGRAN  Take 1 tablet by mouth.     NIFEdipine 60 MG Tbsr  Commonly known as:  ADALAT CC  TAKE 1 TABLET (60 MG TOTAL) BY MOUTH ONCE DAILY.     * NovoFine Plus 32 gauge x 1/6" Ndle  Generic drug:  pen needle, diabetic     * BD Ultra-Fine Stacy Pen Needle 32 gauge x 5/32" Ndle  Generic drug:  pen needle, diabetic     * BD Ultra-Fine Stacy Pen Needle 32 gauge x 5/32" Ndle  Generic drug:  pen needle, diabetic  TEST WITH NOVOLOG THREE TIMES A DAY WITH MEALS AND WITH LEVEMIR AT BEDTIME     ondansetron 4 MG Tbdl  Commonly known as:  ZOFRAN-ODT  Take 1 tablet (4 mg total) by mouth every 8 (eight) hours as needed (n/v).     oxyCODONE 15 MG Tab  Commonly known as:  ROXICODONE  Take 1 tablet (15 mg total) by mouth 4 (four) times daily as needed for Pain.     pantoprazole 20 MG tablet  Commonly known as:  PROTONIX  Take 1 tablet (20 mg total) by mouth once daily.     rosuvastatin 5 MG tablet  Commonly known as:  CRESTOR  Take 1 tablet (5 mg total) by mouth once daily.     sertraline 100 MG tablet  Commonly known as:  ZOLOFT  TAKE 1 TABLET (100 MG TOTAL) BY MOUTH ONCE DAILY.     sildenafil 20 mg Tab  Commonly known as:  REVATIO  Take 5 by mouth 1 hour before sexual activity     tacrolimus 1 MG Cap  Commonly known as:  PROGRAF  TAKE 2 CAPSULES (2 MG TOTAL) BY MOUTH IN THE MORNING & TAKE 1 CAPSULE (1 MG TOTAL) IN THE EVENING     True Metrix Glucose Meter Misc  Generic drug:  blood-glucose meter  TEST 4 (FOUR) TIMES DAILY BEFORE MEALS AND NIGHTLY.     Trulicity 1.5 mg/0.5 mL Pnij  Generic drug:  dulaglutide  Inject 1.5 mg " (1 syringe) into the skin every 7 days.         * This list has 3 medication(s) that are the same as other medications prescribed for you. Read the directions carefully, and ask your doctor or other care provider to review them with you.              Time spent on the discharge of patient: <30 minutes    José Miguel Ibarra MD  General Surgery  [facility]

## 2020-01-24 NOTE — ED NOTES
Pt reports pain at 7/10, states it improved initially after the medication but is going back up now

## 2020-01-24 NOTE — PLAN OF CARE
Discharge noted, No HH or DME noted. In basket message sent to 's office to place patient on waiting list for an earlier appointment    Discharge rounds on patient. Discussed followup appointments, blue discharge folder, discharge nurse will go over home medications and reasons for medications and final discharge instructions. All patient/caregiver questions answered. Patient verbalized understanding.      Future Appointments   Date Time Provider Department Center   1/28/2020 11:40 AM Emma Batista NP [facility] PAINM[facility]   2/3/2020  3:20 PM Emanuel Lopez MD [facility] MED [facility]   2/20/2020 10:15 AM [facility] OIC-US1 MASTER [facility] ULTR [facility]   4/6/2020  8:15 AM [facility], [facility] [place]        01/24/20 1536   Final Note   Assessment Type Final Discharge Note   Anticipated Discharge Disposition Home   Discharge planning education complete? Yes   What phone number can be called within the next 1-3 days to see how you are doing after discharge? [phone]   Hospital Follow Up  Appt(s) scheduled? Yes   Discharge plans and expectations educations in teach back method with documentation complete? Yes   Right Care Referral Info   Post Acute Recommendation No Care   Bernard Lopze RN-BC  Transitional Navigator  [phone]

## 2020-01-24 NOTE — ED NOTES
Pt transferred to floor via stretcher with ED tech. Pt is stable and without distress at the time of transfer.

## 2020-01-24 NOTE — PLAN OF CARE
64M hx of obesity, chronic pain, CKD (baseline Cr 1.2-1.4) , DM, hypothyroid, hepC s/p treatment, gout, etoh abuse, chronic back pain s/p laminectomy, HTN, and PAD s/p liver transplant in 2010 (currently takes prograf) presenting with n/v and abdominal pain. CT scan obtained showing partial SBO.    Clinically stable.    Full H&P note to follow.    Labs reviewed, mild OSMANY though has hx of CKD    -admit to surgery for obs  -place NGT  -pain control  -zofran and phenergan for nausea  -gastroview contrast study--Put 200cc of gastrograffin down NG tube after NG tube has been put to suction for at least 4 hours.  Clamp NG tube for 2 hours then obtain KUB.  Then unclamp NG and place back to low continuous suction. A KUB will be obtained in am  -strict I&Os  -home meds reconciled, continue prograf  -monitor UOP  -IVF @ 160/hr  -fu lactate   -ISS  -levothyroxine dose halfed for oral to IV conversion    José Miguel Ibarra MD  [employer] General Surgery PGY2

## 2020-01-24 NOTE — PLAN OF CARE
LUPE completed admission questions with patient. Plan of care was discussed including future diagnostic tests and NPO status.  All questions answered.  Dr. Ibarra notified of patients arrival to floor.  Education given on safety measures and using call light before getting out of bed by himself. Patient verbalized understanding. Bed locked and in lowest position. Alarm on.

## 2020-01-24 NOTE — DISCHARGE INSTRUCTIONS
Obstruction, Large Bowel (English) View Edit Remove   Abdominal Pain, Adult (English) View Edit Remove   Diabetes, Diet (English) View Edit Remove

## 2020-01-27 ENCOUNTER — PATIENT OUTREACH (OUTPATIENT)
Dept: ADMINISTRATIVE | Facility: CLINIC | Age: 65
End: 2020-01-27

## 2020-01-27 NOTE — PATIENT INSTRUCTIONS
Small Bowel Obstruction     Small bowel obstruction can lead to tissue damage and even tissue death.   A small bowel obstruction occurs when part or all of the small intestine (bowel) is blocked. As a result, digestive contents cant move through the bowel properly and out of the body. Treatment is needed right away to remove the blockage. This can ease painful symptoms. It can also prevent serious problems, such as tissue death or bursting (rupture) of the small bowel. Without treatment, a small bowel obstruction can be fatal.  Causes of small bowel obstruction  A small bowel obstruction can be caused by:  · Scar tissue (adhesions). These may form after belly (abdominal) surgery or an infection.  · Hernia. A hernia is when an organ pushes through a weak spot or tear in the abdomen wall. Part of the small bowel can push out and be seen as a bulge under the belly. Hernias can also occur internally.  · Certain health problems. These include when part of the bowel slides inside another part (intussusception). Other causes include irritable bowel disease such as Crohns disease, and inflammation and sores in the intestine (ulcerative colitis).  · Abnormal tissue growths (tumors). These can form on the inside or outside of the small bowel. They are usually due to cancer.  Symptoms of small bowel obstruction  Common symptoms include:  · Belly cramping and pain  · Belly swelling and bloating  · Upset stomach (nausea) and vomiting  · Can't  pass gas  · Can't pass stool (constipation)  · Diarrhea  Diagnosing small bowel obstruction  Your provider will ask about your symptoms and health history. Youll also have a physical exam. Tests may also be done to confirm the problem. These can include:  · Imaging tests. These provide pictures of the small bowel. Common tests include X-rays and a CT scan.  · Blood tests. These check for infection and other problems, such as excess fluid loss (dehydration).  · Upper GI  (gastrointestinal) series with a small bowel follow-through. This test takes X-rays of the upper digestive tract from the mouth through the small bowel. An X-ray dye (contrast fluid) is used. The dye coats the inside of your upper digestive tract so it will show up clearly on X-rays.  Treating small bowel obstruction  Treatment takes place in a hospital. As part of your care, the following may be done:  · No food or drink is given by mouth. This allows your bowels to rest.  · An IV (intravenous) line is placed in a vein in your arm or hand. The IV line is used to give fluids. It may also be used to give medicines. These may be needed to ease pain, nausea, and other symptoms. They may also be needed to treat or prevent infections.  · A soft, thin, flexible tube (nasogastric tube) is inserted through your nose and into your stomach. The tube is used to remove extra gas and fluid in your stomach and bowels. This helps to ease symptoms such as pain and swelling.  · In severe cases, surgery is done. This may be needed if the small bowel is almost or totally blocked, or there is a hole in the bowel (bowel perforation). During surgery, the blockage is removed. Parts of the bowel may also be removed if there is tissue death. Other repair may be done as well, depending on what caused the blockage. Your healthcare provider will give you more information about surgery, if needed.  · Youll be watched closely in the hospital until your symptoms improve. Your provider will tell you when you can go home.  Long-term concerns   After treatment, most people recover with no lasting effects. If a long part of the bowel is removed, there is a greater chance for lifelong digestive problems. Bowel movements may become irregular. Work with your provider to learn the best ways to manage any symptoms you may have, and to protect your health.  When to call your healthcare provider  Call your provider right away if you have any of the  following:  · Severe pain (Call 911)  · Belly swelling or cramping that wont go away  · Cant pass stool or gas  · Nausea or vomiting (especially if the vomit looks or smells like stool)   Date Last Reviewed: 7/1/2016  © 1445-9596 [employer]. [address] [place] [address], [place] [address]. All rights reserved. This information is not intended as a substitute for professional medical care. Always follow your healthcare professional's instructions.         normal...

## 2020-01-28 ENCOUNTER — LAB VISIT (OUTPATIENT)
Dept: LAB | Facility: OTHER | Age: 65
End: 2020-01-28
Attending: NURSE PRACTITIONER
Payer: MEDICARE

## 2020-01-28 ENCOUNTER — OFFICE VISIT (OUTPATIENT)
Dept: PAIN MEDICINE | Facility: CLINIC | Age: 65
End: 2020-01-28
Payer: MEDICARE

## 2020-01-28 VITALS
RESPIRATION RATE: 18 BRPM | HEIGHT: 73 IN | HEART RATE: 89 BPM | WEIGHT: 273.56 LBS | BODY MASS INDEX: 36.25 KG/M2 | DIASTOLIC BLOOD PRESSURE: 95 MMHG | OXYGEN SATURATION: 100 % | TEMPERATURE: 98 F | SYSTOLIC BLOOD PRESSURE: 153 MMHG

## 2020-01-28 DIAGNOSIS — G95.9 LUMBAR MYELOPATHY: ICD-10-CM

## 2020-01-28 DIAGNOSIS — M54.16 LUMBAR RADICULOPATHY: ICD-10-CM

## 2020-01-28 DIAGNOSIS — Z79.891 ENCOUNTER FOR MONITORING OPIOID MAINTENANCE THERAPY: ICD-10-CM

## 2020-01-28 DIAGNOSIS — M51.36 DDD (DEGENERATIVE DISC DISEASE), LUMBAR: ICD-10-CM

## 2020-01-28 DIAGNOSIS — M53.3 SACROILIAC JOINT PAIN: ICD-10-CM

## 2020-01-28 DIAGNOSIS — Z98.1 S/P LUMBAR SPINAL FUSION: ICD-10-CM

## 2020-01-28 DIAGNOSIS — M47.816 LUMBAR SPONDYLOSIS: ICD-10-CM

## 2020-01-28 DIAGNOSIS — Z51.81 ENCOUNTER FOR MONITORING OPIOID MAINTENANCE THERAPY: ICD-10-CM

## 2020-01-28 DIAGNOSIS — G89.4 CHRONIC PAIN SYNDROME: Primary | ICD-10-CM

## 2020-01-28 DIAGNOSIS — M43.10 ACQUIRED SPONDYLOLISTHESIS: ICD-10-CM

## 2020-01-28 DIAGNOSIS — M96.1 POSTLAMINECTOMY SYNDROME OF LUMBAR REGION: ICD-10-CM

## 2020-01-28 PROCEDURE — 99214 PR OFFICE/OUTPT VISIT, EST, LEVL IV, 30-39 MIN: ICD-10-PCS | Mod: S$PBB,,, | Performed by: NURSE PRACTITIONER

## 2020-01-28 PROCEDURE — 80307 DRUG TEST PRSMV CHEM ANLYZR: CPT

## 2020-01-28 PROCEDURE — 99999 PR PBB SHADOW E&M-EST. PATIENT-LVL IV: CPT | Mod: PBBFAC,,, | Performed by: NURSE PRACTITIONER

## 2020-01-28 PROCEDURE — 36415 COLL VENOUS BLD VENIPUNCTURE: CPT

## 2020-01-28 PROCEDURE — 99999 PR PBB SHADOW E&M-EST. PATIENT-LVL IV: ICD-10-PCS | Mod: PBBFAC,,, | Performed by: NURSE PRACTITIONER

## 2020-01-28 PROCEDURE — 99214 OFFICE O/P EST MOD 30 MIN: CPT | Mod: PBBFAC | Performed by: NURSE PRACTITIONER

## 2020-01-28 PROCEDURE — 99214 OFFICE O/P EST MOD 30 MIN: CPT | Mod: S$PBB,,, | Performed by: NURSE PRACTITIONER

## 2020-01-28 RX ORDER — OXYCODONE HYDROCHLORIDE 15 MG/1
15 TABLET ORAL 4 TIMES DAILY PRN
Qty: 120 TABLET | Refills: 0 | Status: SHIPPED | OUTPATIENT
Start: 2020-03-31 | End: 2020-04-30

## 2020-01-28 RX ORDER — OXYCODONE HYDROCHLORIDE 15 MG/1
15 TABLET ORAL 4 TIMES DAILY PRN
Qty: 120 TABLET | Refills: 0 | Status: SHIPPED | OUTPATIENT
Start: 2020-02-01 | End: 2020-03-02

## 2020-01-28 RX ORDER — OXYCODONE HYDROCHLORIDE 15 MG/1
15 TABLET ORAL 4 TIMES DAILY PRN
Qty: 120 TABLET | Refills: 0 | Status: SHIPPED | OUTPATIENT
Start: 2020-03-01 | End: 2020-03-31 | Stop reason: SDUPTHER

## 2020-01-28 NOTE — PHYSICIAN QUERY
PT Name: Vick Gonzalez  MR #: [id number]     Physician Query Form - Bowel Obstruction Clarification     CDS/: Yancy Murguia RN, CDS               Contact information: [email or web address]                                                                                                                         [phone]  This form is a permanent document in the medical record.     Query Date: January 28, 2020    By submitting this query, we are merely seeking further clarification of documentation to reflect the severity of illness of your patient. Please utilize your independent clinical judgment when addressing the question(s) below.    The Medical record reflects the following:     Indicators   Supporting Clinical Findings Location in Medical Record   x Bowel obstruction, intestinal obstruction, LBO or SBO documented PRINCIPAL PROBLEM:  SBO  DC Summary 1/24   x Radiology findings There are a few mildly distended mid to distal small bowel loops in the right quadrant, fluid-filled, with associated surrounding edema.  There is transition to relatively decompressed distal small bowel, findings are concerning for at least partial small bowel obstruction, possibly on the basis of adhesion, with differential including infectious or inflammatory enteritis.  No findings to suggest high-grade obstruction   CT Abdomen 1/23   x Treatment/Medication NG tube placed with 100 cc of bilious immediately. Gastroview placed down NG tube.  To sigmoid colon.  Patient had 2 large bowel movements this morning and pain and abdominal extension are improved. H&P 1/24    Procedure/Surgery     x Other Past Surgical History:  CHOLECYSTECTOMY      LIVER TRANSPLANT       H&P 1/24     Provider, please further specify the bowel obstruction diagnosis:  [ x  ] Partial or incomplete intestinal obstruction, due to adhesions   [   ] Partial or incomplete intestinal obstruction, due to other (please specify): ____________   [   ]  Partial or incomplete intestinal obstruction, unknown or unspecified etiology   [   ] Other intestinal condition (please specify): _____________________   [   ] Clinically Undetermined     Please document in your progress notes daily for the duration of treatment until resolved, and include in your discharge summary.

## 2020-01-28 NOTE — PROGRESS NOTES
Chronic patient Established Note (Follow up visit)      SUBJECTIVE:    Vick Gonzalez presents to the clinic for a follow-up appointment for lower back pain. He is s/p right SI joint injection on 12/2/2019. He reports 50% relief of his low back and buttock pain. He continues to report low back pain that radiates down the back of both legs to his feet, right greater than left. His pain is worse with prolonged walking and activity. He continues to report benefit with current medication regimen. He continues to take Gabapentin with benefit. He continues to take Oxycodone with benefit and with adverse effects. He denies any other health changes. His pain today is 6/10.        Pain Disability Index Review:  Last 3 PDI Scores 1/28/2020 11/5/2019 8/14/2019   Pain Disability Index (PDI) 9 16 32       Pain Medications:    - Opioids: Roxicodone (Oxycodone/Acetaminophen) 15 mg QID PRN pain    Others: Gabapentin 2400 mg daily    Opioid Contract: yes     report:  Reviewed and consistent with medication use as prescribed.    Pain Procedures:   1/15/15 Right TFESI @ L5 & S1   12/2/2019- Right SI joint injection- 50% relief     Physical Therapy/Home Exercise: yes, per self    Imaging:   Lumbar MRI 6/16/15  Narrative   Technique: Routine lumbar spine MRI performed without contrast.    Comparison: MRI 06/16/2015, CT 06/15/2015.    Findings:    There are postsurgical changes consistent with L5-S1 posterior instrumented fusion with bilateral pedicular screws, vertical stabilizing rods and an intervertebral disk spacer.  When compared to the MRI dated 06/16/2015 00:33, there are new postsurgical   changes consistent with left L5 hemilaminectomy.  There is a 2.5 cm ill-defined fluid collection at the site of hemilaminectomy that is not well evaluated due to the lack of IV contrast but appears to extend anteriorly into the spinal canal and into the   left foraminal zone.  There is as possible small fluid collection within the  anterior right epidural space that may also due to compression of the adjacent spinal canal.  There is stable grade I anterolistheses of L5 on S1 with persistent high signal   intensity adjacent to the left lateral aspect of the disk spacer that demonstrates moderate associated bone marrow edema of the adjacent vertebral endplates, findings that are when compared to the previous exam.  Note is made that there is an apparent   high signal intensity within the nerve roots posterior to the L5-S1 level that was not definitely present on the previous exam.    There is mild persistent height loss loss involving the L5 vertebral body, likely degenerative in nature.  Remaining vertebral body heights are well-maintained without findings to suggest acute fracture.    There is mild intervertebral disk spacing and desiccation at L4-L5 with a small circumferential disk bulge. No disk protrusion or extrusion. There is moderate bilateral facet arthropathy and mild bilateral uncomfortable and buckling at this level.  These   findings contribute to mild spinal canal stenosis and mild bilateral foraminal narrowing.    Noting aforementioned postsurgical changes, there is persistent severe bilateral foraminal narrowing that is difficult to accurately characterize due to adjacent artifact.    There is edema anterior to the L4, L5 and S1 vertebral bodies, presumably postsurgical in nature.  No drainable fluid collections identified. Visualized retroperitoneal organs are unremarkable.   Impression       Postsurgical changes of L5-S1 posterior spinal fusion and left L5 hemilaminectomy. There is an ill-defined fluid collection at the site of hemilaminectomy that is not well evaluated due to the lack of IV contrast but appears to extend anteriorly with   suspected resulting mass effect on the spinal canal and the left foraminal zone.  There is a suspected small fluid collection within the anterior right epidural space that may contribute to  additional mass effect on the adjacent spinal canal. While   findings may represent sequela of expected postsurgical changes, continued follow-up is advised to ensure improvement and/or resolution.    Persistent abnormal high signal intensity adjacent to the left lateral aspect of the intervertebral disk spacer with moderate associated bone marrow edema of the adjacent vertebral endplates. Findings are similar when compared to the previous exam could   represent postsurgical changes. Early infection could have a similar appearance and is possible. Continued follow-up is advised.    Apparent high signal intensity within the nerve roots posterior to the L5-S1 level that was not definitely present on the previous exam. Findings may be artifactual in nature however, sequela of nerve root inflammation/edema is possible noting recent   surgery.    This report has been flagged in the Epic medical record     .  CMP  Sodium   Date Value Ref Range Status   01/24/2020 141 136 - 145 mmol/L Final     Potassium   Date Value Ref Range Status   01/24/2020 4.4 3.5 - 5.1 mmol/L Final     Chloride   Date Value Ref Range Status   01/24/2020 106 95 - 110 mmol/L Final     CO2   Date Value Ref Range Status   01/24/2020 26 23 - 29 mmol/L Final     Glucose   Date Value Ref Range Status   01/24/2020 194 (H) 70 - 110 mg/dL Final     BUN, Bld   Date Value Ref Range Status   01/24/2020 17 8 - 23 mg/dL Final     Creatinine   Date Value Ref Range Status   01/24/2020 1.4 0.5 - 1.4 mg/dL Final     Calcium   Date Value Ref Range Status   01/24/2020 8.6 (L) 8.7 - 10.5 mg/dL Final     Total Protein   Date Value Ref Range Status   01/24/2020 7.1 6.0 - 8.4 g/dL Final     Albumin   Date Value Ref Range Status   01/24/2020 3.6 3.5 - 5.2 g/dL Final   07/25/2018 4.5 3.6 - 5.1 g/dL Final     Comment:     @ Test Performed By:  [facility]  Marquis Wright M.D., Ph.D.,   [address] [place]  [phone]  [facility]  [id number]       Total Bilirubin   Date Value Ref Range Status   01/24/2020 0.5 0.1 - 1.0 mg/dL Final     Comment:     For infants and newborns, interpretation of results should be based  on gestational age, weight and in agreement with clinical  observations.  Premature Infant recommended reference ranges:  Up to 24 hours.............<8.0 mg/dL  Up to 48 hours............<12.0 mg/dL  3-5 days..................<15.0 mg/dL  6-29 days.................<15.0 mg/dL       Alkaline Phosphatase   Date Value Ref Range Status   01/24/2020 69 55 - 135 U/L Final     AST   Date Value Ref Range Status   01/24/2020 30 10 - 40 U/L Final     ALT   Date Value Ref Range Status   01/24/2020 33 10 - 44 U/L Final     Anion Gap   Date Value Ref Range Status   01/24/2020 9 8 - 16 mmol/L Final     eGFR if    Date Value Ref Range Status   01/24/2020 >60 >60 mL/min/1.73 m^2 Final     eGFR if non    Date Value Ref Range Status   01/24/2020 53 (A) >60 mL/min/1.73 m^2 Final     Comment:     Calculation used to obtain the estimated glomerular filtration  rate (eGFR) is the CKD-EPI equation.            Allergies:   Review of patient's allergies indicates:   Allergen Reactions    Naproxen      Other reaction(s): problems w/liver/kid    Oxaprozin      Other reaction(s): cause problems w/kid/liver       Current Medications:   Current Outpatient Medications   Medication Sig Dispense Refill    allopurinol (ZYLOPRIM) 100 MG tablet TAKE 1 TABLET BY MOUTH TWICE A DAY 60 tablet 7    amitriptyline (ELAVIL) 50 MG tablet Take 1 tablet (50 mg total) by mouth every evening. For nerve pain and sleep 30 tablet 11    BD ULTRA-FINE MENDY PEN NEEDLES 32 gauge x 5/32" Ndle       blood sugar diagnostic Strp 1 strip by Misc.(Non-Drug; Combo Route) route 3 (three) times daily. 100 strip 11    calcium carbonate-vitamin D3 (CALTRATE 600 + D) 600 mg(1,500mg) -400 unit Chew       colchicine 0.6 mg tablet Take 1 tablet " "(0.6 mg total) by mouth 2 (two) times daily. 30 tablet 2    dulaglutide (TRULICITY) 1.5 mg/0.5 mL PnIj Inject 1.5 mg (1 syringe) into the skin every 7 days. 2 mL 11    gabapentin (NEURONTIN) 800 MG tablet TAKE 1 TABLET BY MOUTH THREE TIMES A DAY 90 tablet 11    insulin aspart U-100 (NOVOLOG FLEXPEN U-100 INSULIN) 100 unit/mL (3 mL) InPn pen Inject 20 Units into the skin 3 (three) times daily with meals. 15 mL 11    insulin detemir U-100 (LEVEMIR FLEXTOUCH) 100 unit/mL (3 mL) SubQ InPn pen Inject 70 Units into the skin every evening. 15 mL 11    lancets Misc 1 each by Misc.(Non-Drug; Combo Route) route 4 (four) times daily before meals and nightly. 200 each 11    levothyroxine (SYNTHROID) 88 MCG tablet TAKE 1 TABLET (88 MCG TOTAL) BY MOUTH ONCE DAILY. 90 tablet 4    lisinopril (PRINIVIL,ZESTRIL) 40 MG tablet Take 1 tablet (40 mg total) by mouth once daily. 90 tablet 3    metoprolol succinate (TOPROL-XL) 200 MG 24 hr tablet TAKE 1 TABLET (200 MG TOTAL) BY MOUTH ONCE DAILY. 30 tablet 11    multivitamin (THERAGRAN) per tablet Take 1 tablet by mouth.      NIFEdipine (ADALAT CC) 60 MG TbSR TAKE 1 TABLET (60 MG TOTAL) BY MOUTH ONCE DAILY. 90 tablet 1    NOVOFINE PLUS 32 gauge x 1/6" Ndle       ondansetron (ZOFRAN-ODT) 4 MG TbDL Take 1 tablet (4 mg total) by mouth every 8 (eight) hours as needed (n/v). 10 tablet 0    oxyCODONE (ROXICODONE) 15 MG Tab Take 1 tablet (15 mg total) by mouth 4 (four) times daily as needed for Pain. 120 tablet 0    pantoprazole (PROTONIX) 20 MG tablet Take 1 tablet (20 mg total) by mouth once daily. 30 tablet 0    pen needle, diabetic (BD ULTRA-FINE MENDY PEN NEEDLE) 32 gauge x 5/32" Ndle TEST WITH NOVOLOG THREE TIMES A DAY WITH MEALS AND WITH LEVEMIR AT BEDTIME 100 each 11    rosuvastatin (CRESTOR) 5 MG tablet Take 1 tablet (5 mg total) by mouth once daily. 90 tablet 3    sertraline (ZOLOFT) 100 MG tablet TAKE 1 TABLET (100 MG TOTAL) BY MOUTH ONCE DAILY. 30 tablet 7    sildenafil " (REVATIO) 20 mg Tab Take 5 by mouth 1 hour before sexual activity 25 tablet 11    tacrolimus (PROGRAF) 1 MG Cap TAKE 2 CAPSULES (2 MG TOTAL) BY MOUTH IN THE MORNING & TAKE 1 CAPSULE (1 MG TOTAL) IN THE EVENING 90 capsule 11    TRUE METRIX GLUCOSE METER Misc TEST 4 (FOUR) TIMES DAILY BEFORE MEALS AND NIGHTLY. 1 each 0     No current facility-administered medications for this visit.        REVIEW OF SYSTEMS:    GENERAL:  No weight loss, malaise or fevers.  HEENT:  Negative for frequent or significant headaches.  NECK:  Negative for lumps, goiter, pain and significant neck swelling.  RESPIRATORY:  Negative for cough, wheezing or shortness of breath.  CARDIOVASCULAR:  Negative for chest pain, leg swelling or palpitations. H/O hypertension.  GI:  Negative for abdominal discomfort, blood in stools or black stools or change in bowel habits.  MUSCULOSKELETAL:  See HPI.  SKIN:  Negative for lesions, rash, and itching.  PSYCH:  Negative for sleep disturbance, mood disorder and recent psychosocial stressors.  HEMATOLOGY/LYMPHOLOGY:  Negative for prolonged bleeding, bruising easily or swollen nodes. H/O liver transplant.  NEURO:   No history of headaches, syncope, paralysis, seizures or tremors.  ENDO: Diabetes.   All other reviewed and negative other than HPI.    Past Medical History:  Past Medical History:   Diagnosis Date    Anemia     Anxiety     Chronic pain syndrome 7/13/2011    CKD (chronic kidney disease), stage III     Diabetes mellitus type II, uncontrolled     Discitis of lumbosacral region 1/16/2015    ED (erectile dysfunction)     Encounter for blood transfusion     Genital herpes     Gout, arthritis     History of alcohol abuse     History of hepatitis C, s/p successful Rx w/ SVR24 (cure) - 5/2018 8/23/2011    Completed 24wks Epclusa + RBV w/SVR12 - 2/2018  -     History of positive PPD, treatment status unknown     Pulmonary granulomas, negative sputum cultures for AFB and indeterminate quantferon  test    History of substance abuse     Hypertension     Hypothyroidism     Liver replaced by transplant 8/23/2011    DATE: 12/16/2013  LIVER BIOPSY : REASON:  hep C staging  PATHOLOGY COMMITTEE NOTE/PLAN: :grade  1 / stage 1        Pancreatitis 2016    Peptic ulcer disease        Past Surgical History:  Past Surgical History:   Procedure Laterality Date    CHOLECYSTECTOMY      INJECTION OF JOINT Right 12/2/2019    Procedure: INJECTION, JOINT SI;  Surgeon: Thu Penn MD;  Location: [facility];  Service: Pain Management;  Laterality: Right;  RT SI JNT INJ    LIVER TRANSPLANT  06/2010    SPINE SURGERY         Family History:  Family History   Problem Relation Age of Onset    Cancer Mother     Diabetes Mother     Heart disease Mother     Diabetes Sister     Cancer Maternal Uncle 82        colon CA    Melanoma Neg Hx     Psoriasis Neg Hx     Lupus Neg Hx     Eczema Neg Hx     Acne Neg Hx        Social History:  Social History     Socioeconomic History    Marital status:      Spouse name: Not on file    Number of children: Not on file    Years of education: Not on file    Highest education level: Not on file   Occupational History    Not on file   Social Needs    Financial resource strain: Not on file    Food insecurity:     Worry: Not on file     Inability: Not on file    Transportation needs:     Medical: Not on file     Non-medical: Not on file   Tobacco Use    Smoking status: Former Smoker    Smokeless tobacco: Never Used   Substance and Sexual Activity    Alcohol use: No     Comment: over 5 years ago, none currently    Drug use: No    Sexual activity: Not on file   Lifestyle    Physical activity:     Days per week: Not on file     Minutes per session: Not on file    Stress: Not on file   Relationships    Social connections:     Talks on phone: Not on file     Gets together: Not on file     Attends Worship service: Not on file     Active member of club or organization:  "Not on file     Attends meetings of clubs or organizations: Not on file     Relationship status: Not on file   Other Topics Concern    Not on file   Social History Narrative    Not on file       OBJECTIVE:    BP (!) 153/95   Pulse 89   Temp 97.6 °F (36.4 °C)   Resp 18   Ht 6' 1" (1.854 m)   Wt 124.1 kg (273 lb 9.5 oz)   SpO2 100%   BMI 36.10 kg/m²     PHYSICAL EXAMINATION:    General appearance: Well appearing, in no acute distress, alert and oriented x3.  Psych:  Mood and affect appropriate.  Skin: Skin color, texture, turgor normal, no rashes or lesions, in both upper and lower body.  Head/face:  Atraumatic, normocephalic. No palpable lymph nodes.  GI: Abdomen soft. TTP over right upper quadrant along scar.   Back: Straight leg raising in the sitting position is negative to radicular pain bilaterally. There is pain with palpation over lumbar spine. Limited ROM with pain on flexion and extension. Positive facet loading bilaterally.   Extremities: Peripheral joint ROM is full and pain free without obvious instability or laxity in all four extremities. There is pain with palpation over right SI joint. FABERs is positive on the right, negative on the left. No deformities or skin discoloration. Good capillary refill.   Musculoskeletal:  Right plantar flexion 4/5.  All other LE strength 5/5 and symmetric.  No atrophy or tone abnormalities are noted.  Neuro: Bilateral upper and lower extremity coordination and muscle stretch reflexes are physiologic and symmetric.  Plantar response are downgoing.  Decreased sensation to anterior aspect of right foot.   Gait: Antalgic- ambulates with cane.    ASSESSMENT: 64 y.o. year old male with lower back and right leg pain, consistent with the following diagnoses:     1. Chronic pain syndrome  oxyCODONE (ROXICODONE) 15 MG Tab    oxyCODONE (ROXICODONE) 15 MG Tab    oxyCODONE (ROXICODONE) 15 MG Tab   2. Postlaminectomy syndrome of lumbar region  oxyCODONE (ROXICODONE) 15 MG Tab "    oxyCODONE (ROXICODONE) 15 MG Tab    oxyCODONE (ROXICODONE) 15 MG Tab   3. S/P lumbar spinal fusion     4. Lumbar radiculopathy  oxyCODONE (ROXICODONE) 15 MG Tab    oxyCODONE (ROXICODONE) 15 MG Tab    oxyCODONE (ROXICODONE) 15 MG Tab   5. Acquired spondylolisthesis  oxyCODONE (ROXICODONE) 15 MG Tab    oxyCODONE (ROXICODONE) 15 MG Tab    oxyCODONE (ROXICODONE) 15 MG Tab   6. Lumbar spondylosis     7. DDD (degenerative disc disease), lumbar  oxyCODONE (ROXICODONE) 15 MG Tab    oxyCODONE (ROXICODONE) 15 MG Tab    oxyCODONE (ROXICODONE) 15 MG Tab   8. Sacroiliac joint pain     9. Lumbar myelopathy  oxyCODONE (ROXICODONE) 15 MG Tab    oxyCODONE (ROXICODONE) 15 MG Tab    oxyCODONE (ROXICODONE) 15 MG Tab   10. Encounter for monitoring opioid maintenance therapy  oxyCODONE (ROXICODONE) 15 MG Tab    oxyCODONE (ROXICODONE) 15 MG Tab    oxyCODONE (ROXICODONE) 15 MG Tab    Pain Clinic Drug Screen    DRUGS OF ABUSE SCREEN, BLOOD         PLAN:     - Previous imaging was reviewed and discussed with the patient today. Recent labs reviewed with patient today.     - We can repeat SI joint injection as needed.     - Consider ALCIDES in the future. He would like to wait at this time.     - Patient can continue Roxicodone 15 mg QID PRN pain, #120. Three months of prescriptions provided today.      - The patient is here today for a refill of current pain medications and they believe these provide effective pain control and improvements in quality of life.  The patient notes no serious side effects, and feels the benefits outweigh the risks.  The patient was reminded of the pain contract that they signed previously as well as the risks and benefits of the medication including possible death.  The updated [place] Board of Pharmacy prescription monitoring program was reviewed, and the patient has been found to be compliant with current treatment plan. Medication management provided by Dr. Penn.     - Continue Gabapentin 800 mg TID.     -  Pain Contract signed 8/14/2019.     - UDS from 8/22/2018 reviewed and consistent. Serum screen from 8/14/2019 reviewed, negative for opioids. UDS ordered, patient unable to void. Serum screen ordered today.      - The patient will continue a home exercise routine to help with pain and strengthening.      - RTC in 3 months or sooner if needed.    - Counseled patient regarding the importance of constant sleeping habits and physical therapy.     - Dr. Penn was consulted on the patient and agrees with this plan.    The above plan and management options were discussed at length with patient. Patient is in agreement with the above and verbalized understanding.    Emma Batista NP  01/28/2020

## 2020-02-03 ENCOUNTER — OFFICE VISIT (OUTPATIENT)
Dept: FAMILY MEDICINE | Facility: CLINIC | Age: 65
End: 2020-02-03
Payer: MEDICARE

## 2020-02-03 VITALS
DIASTOLIC BLOOD PRESSURE: 84 MMHG | BODY MASS INDEX: 36.81 KG/M2 | HEIGHT: 73 IN | TEMPERATURE: 98 F | OXYGEN SATURATION: 98 % | WEIGHT: 277.75 LBS | SYSTOLIC BLOOD PRESSURE: 120 MMHG | HEART RATE: 89 BPM

## 2020-02-03 DIAGNOSIS — Z86.19 HISTORY OF HEPATITIS C: ICD-10-CM

## 2020-02-03 DIAGNOSIS — E78.1 HYPERTRIGLYCERIDEMIA: ICD-10-CM

## 2020-02-03 DIAGNOSIS — E11.65 UNCONTROLLED TYPE 2 DIABETES MELLITUS WITH HYPERGLYCEMIA: ICD-10-CM

## 2020-02-03 DIAGNOSIS — Z94.4 LIVER TRANSPLANT RECIPIENT: ICD-10-CM

## 2020-02-03 DIAGNOSIS — D84.9 IMMUNOSUPPRESSION: ICD-10-CM

## 2020-02-03 DIAGNOSIS — N18.30 CKD (CHRONIC KIDNEY DISEASE), STAGE III: ICD-10-CM

## 2020-02-03 DIAGNOSIS — M46.1 SACROILIITIS: ICD-10-CM

## 2020-02-03 DIAGNOSIS — I10 HYPERTENSION, UNSPECIFIED TYPE: ICD-10-CM

## 2020-02-03 DIAGNOSIS — E03.9 HYPOTHYROIDISM, UNSPECIFIED TYPE: ICD-10-CM

## 2020-02-03 DIAGNOSIS — G89.4 CHRONIC PAIN SYNDROME: ICD-10-CM

## 2020-02-03 DIAGNOSIS — I73.9 PAD (PERIPHERAL ARTERY DISEASE): ICD-10-CM

## 2020-02-03 DIAGNOSIS — K56.609 SBO (SMALL BOWEL OBSTRUCTION): Primary | ICD-10-CM

## 2020-02-03 PROCEDURE — 99496 TRANSJ CARE MGMT HIGH F2F 7D: CPT | Mod: PBBFAC,PO | Performed by: FAMILY MEDICINE

## 2020-02-03 PROCEDURE — 99495 TCM SERVICES (MODERATE COMPLEXITY): ICD-10-PCS | Mod: S$PBB,,, | Performed by: FAMILY MEDICINE

## 2020-02-03 PROCEDURE — 99999 PR PBB SHADOW E&M-EST. PATIENT-LVL III: CPT | Mod: PBBFAC,,, | Performed by: FAMILY MEDICINE

## 2020-02-03 PROCEDURE — 99999 PR PBB SHADOW E&M-EST. PATIENT-LVL III: ICD-10-PCS | Mod: PBBFAC,,, | Performed by: FAMILY MEDICINE

## 2020-02-03 PROCEDURE — 99213 OFFICE O/P EST LOW 20 MIN: CPT | Mod: PBBFAC,PO | Performed by: FAMILY MEDICINE

## 2020-02-03 PROCEDURE — 99495 TRANSJ CARE MGMT MOD F2F 14D: CPT | Mod: S$PBB,,, | Performed by: FAMILY MEDICINE

## 2020-02-03 RX ORDER — SILDENAFIL 100 MG/1
100 TABLET, FILM COATED ORAL ONCE AS NEEDED
Qty: 30 TABLET | Refills: 11 | Status: SHIPPED | OUTPATIENT
Start: 2020-02-03 | End: 2020-02-03 | Stop reason: SDUPTHER

## 2020-02-03 RX ORDER — SILDENAFIL 100 MG/1
100 TABLET, FILM COATED ORAL ONCE AS NEEDED
Qty: 30 TABLET | Refills: 11 | Status: SHIPPED | OUTPATIENT
Start: 2020-02-03 | End: 2020-08-31

## 2020-02-03 NOTE — TELEPHONE ENCOUNTER
----- Message from Karin Love sent at 2/3/2020  5:01 PM CST -----  Contact: self  Patient is requesting a call back concerning  His medication for sildenafil (VIAGRA) 100 MG tablet was sent to the wrong Pharmacy and would like it to go to [email or web address]. Please call

## 2020-02-03 NOTE — PROGRESS NOTES
Transitional Care Note  Subjective:       Patient ID: Vick Gonzalez is a 64 y.o. male.  Chief Complaint: No chief complaint on file.    Family and/or Caretaker present at visit?  No.  Diagnostic tests reviewed/disposition: I have reviewed all completed as well as pending diagnostic tests at the time of discharge.  Disease/illness education: y  Home health/community services discussion/referrals: Patient does not have home health established from hospital visit.  They do not need home health.  If needed, we will set up home health for the patient.   Establishment or re-establishment of referral orders for community resources: No other necessary community resources.   Discussion with other health care providers: No discussion with other health care providers necessary.   HPI  Review of Systems    Objective:      Physical Exam    Assessment:       1. SBO (small bowel obstruction)    2. Uncontrolled type 2 diabetes mellitus with hyperglycemia    3. CKD (chronic kidney disease), stage III    4. Hypertension, unspecified type    5. Hypertriglyceridemia    6. Liver transplant recipient    7. Immunosuppression    8. Hypothyroidism, unspecified type    9. Chronic pain syndrome    10. History of hepatitis C, s/p successful Rx w/ SVR24 (cure) - 5/2018    11. PAD (peripheral artery disease)    12. Sacroiliitis        Plan:             (Portions of this note were dictated using voice recognition software and may contain dictation related errors in spelling/grammar/syntax not found on text review)    CC:hospital follow-up    HPI: 64 y.o. male here for hospital follow-up .  Presented to ED with nausea/vomiting/abdominal pain. CT showed partial SBO.  Admitted for observation, NG tube placed.  Subsequently had 2 large bowel movements with improvement in abdominal pain and distension.  Was able to tolerate regular diet and was discharged      CT abdomen pelvis 01/23/2020  1. Prominent inflamed small bowel loops within the left  quadrant noting possible transition to decompressed small bowel distally.  Findings may reflect partial small bowel obstruction on the basis of adhesion, with differential including infectious or inflammatory enteritis.  Correlation is advised.  2. Low-attenuation of the hepatic allograft, correlation with LFTs advised.  Biliary prominence appears stable.  3. Possible left pulmonary nodules.  For multiple solid nodules all <6 mm, Fleischner Society 2017 guidelines recommend no routine follow up for a low risk patient, or follow up with non-contrast chest CT at 12 months after discovery in a high risk patient.    Currently doing well.  Normal bowels.      Other chronic medical history as below:    Diabetes:  On Levemir 70 units daily, NovoLog 20 units with meals.  Trulicity currently at 1.5 mg weekly.  A1c was improving to 7.4 from 9 range with addition of Trulicity but most recent check shows elevation as below to 8.7.  He currently admits that he was drinking about 5 monster energy drinks every day and feels that this probably was elevating his blood sugars.  Since hospitalization he has stopped this practice.    Eye exam:  Optometry visit 10/29/2019  Does get neuropathy symptoms that we discussed likely is on account of his diabetes    Hypertension on lisinopril 40 mg daily, metoprolol 200 mg daily, nifedipine 60 mg daily.      Started Crestor 5 mg daily at last visit.    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  On Prograf.   fibroscan showed stage II fibrosis     Chronic pain, followed by pain management.  On oxycodone, gabapentin.   Reviewed last pain management note on 01/28/2020.  Consideration of SI joint injection as needed (12/02/2019 performed), ALCIDES although patient wanted to defer at the moment.   Advised on continuing current meds and physical exercise (tries to walk with a cane).  No changes to therapy recommended.  UDS on file through pain management     CKD stage 3:  Renal function has been stable as below    ED, would like a prescription of Viagra    Past Medical History:   Diagnosis Date    Anemia     Anxiety     Chronic pain syndrome 7/13/2011    CKD (chronic kidney disease), stage III     Diabetes mellitus type II, uncontrolled     Discitis of lumbosacral region 1/16/2015    ED (erectile dysfunction)     Encounter for blood transfusion     Genital herpes     Gout, arthritis     History of alcohol abuse     History of hepatitis C, s/p successful Rx w/ SVR24 (cure) - 5/2018 8/23/2011    Completed 24wks Epclusa + RBV w/SVR12 - 2/2018  -     History of positive PPD, treatment status unknown     Pulmonary granulomas, negative sputum cultures for AFB and indeterminate quantferon test    History of substance abuse     Hypertension     Hypothyroidism     Liver replaced by transplant 8/23/2011    DATE: 12/16/2013  LIVER BIOPSY : REASON:  hep C staging  PATHOLOGY COMMITTEE NOTE/PLAN: :grade  1 / stage 1        Pancreatitis 2016    Peptic ulcer disease        Past Surgical History:   Procedure Laterality Date    CHOLECYSTECTOMY      INJECTION OF JOINT Right 12/2/2019    Procedure: INJECTION, JOINT SI;  Surgeon: Thu Penn MD;  [facility];  Service: Pain Management;  Laterality: Right;  RT SI JNT INJ    LIVER TRANSPLANT  06/2010    SPINE SURGERY         Family History   Problem Relation Age of Onset    Cancer Mother     Diabetes Mother     Heart disease Mother     Diabetes Sister     Cancer Maternal Uncle 82        colon CA    Melanoma Neg Hx     Psoriasis Neg Hx     Lupus Neg Hx     Eczema Neg Hx     Acne Neg Hx        Social History     Tobacco Use    Smoking status: Former Smoker    Smokeless tobacco: Never Used   Substance Use Topics    Alcohol use: No      Comment: over 5 years ago, none currently    Drug use: No       Lab Results   Component Value Date    WBC 8.20 01/24/2020    HGB 12.9 (L) 01/24/2020    HCT 40.7 01/24/2020    MCV 88 01/24/2020     01/24/2020    CHOL 163 10/04/2019    TRIG 403 (H) 10/04/2019    HDL 30 (L) 10/04/2019    ALT 33 01/24/2020    AST 30 01/24/2020    BILITOT 0.5 01/24/2020    ALKPHOS 69 01/24/2020     01/24/2020    K 4.4 01/24/2020     01/24/2020    CREATININE 1.4 01/24/2020    ESTGFRAFRICA >60 01/24/2020    EGFRNONAA 53 (A) 01/24/2020    CALCIUM 8.6 (L) 01/24/2020    ALBUMIN 3.6 01/24/2020    BUN 17 01/24/2020    CO2 26 01/24/2020    TSH 1.924 07/25/2018    PSA 0.30 09/06/2016    PSADIAG 0.28 10/09/2017    INR 1.0 07/10/2019    HGBA1C 8.7 (H) 01/23/2020    MICALBCREAT 186.1 (H) 03/27/2017    LDLCALC Invalid, Trig>400.0 10/04/2019     (H) 01/24/2020           Hemoglobin A1C (%)   Date Value   01/23/2020 8.7 (H)   10/04/2019 7.4 (H)   06/03/2019 9.2 (H)   02/25/2019 9.9 (H)   03/28/2018 8.3 (H)   10/02/2017 7.2 (H)           ROS:  GENERAL: No fever, chills, fatigability or weight loss.  SKIN: No rashes, no itching.  HEAD: No headaches.  EYES: No visual changes  EARS: No ear pain or changes in hearing.  NOSE: No congestion or rhinorrhea.  MOUTH & THROAT: No hoarseness, change in voice, or sore throat.  NODES: Denies swollen glands.  CHEST: Denies NARAYANAN, cyanosis, wheezing, cough and sputum production.  CARDIOVASCULAR: Denies chest pain, PND, orthopnea.  ABDOMEN:  Above  URINARY: No flank pain, dysuria or hematuria.  PERIPHERAL VASCULAR: No claudication or cyanosis.   MUSCULOSKELETAL:  No acute issues  NEUROLOGIC:  Neuropathy    Vital signs reviewed  PE:   APPEARANCE: Well nourished, well developed, in no acute distress.    HEAD: Normocephalic, atraumatic.  EYES: PERRL. EOMI.   Conjunctivae noninjected.  EARS: TM's intact. Light reflex normal. No retraction or perforation  NOSE: Mucosa pink. Airway clear.  MOUTH &  THROAT: No tonsillar enlargement. No pharyngeal erythema or exudate.   NECK: Supple with no cervical lymphadenopathy.  No carotid bruits.  No thyromegaly.   CHEST: Good inspiratory effort. Lungs clear to auscultation with no wheezes or crackles.  CARDIOVASCULAR: Normal S1, S2. No rubs, murmurs, or gallops.  ABDOMEN: Bowel sounds normal. Not distended. Soft. No tenderness or masses. No organomegaly.  EXTREMITIES: No edema      IMPRESSION  1. SBO (small bowel obstruction)    2. Uncontrolled type 2 diabetes mellitus with hyperglycemia    3. CKD (chronic kidney disease), stage III    4. Hypertension, unspecified type    5. Hypertriglyceridemia    6. Liver transplant recipient    7. Immunosuppression    8. Hypothyroidism, unspecified type    9. Chronic pain syndrome    10. History of hepatitis C, s/p successful Rx w/ SVR24 (cure) - 5/2018    11. PAD (peripheral artery disease)    12. Sacroiliitis            PLAN  Reviewed hospitalization documentation, imaging, labs as above    SBO:  Treated conservatively.  No surgery needed.  No recurrence.  Notify for any further issues developing    Diabetes:  Uncontrolled after initial improved control with starting Trulicity.  Sounds like a lot of this is dietary related given drinking 5 energy drinks every day.  He has currently stopped this practice since hospitalization.  Emphasis on importance of carbohydrate control, exercise.  Continue current therapy including Trulicity 1.5 mg weekly, Levemir 70 units daily, NovoLog 20 t.i.d.    Hypothyroidism:  Continue Synthroid    Hypertension:  Blood pressure improved on recheck.  Continue current therapy    Chronic pain syndrome:  Continue pain management follow-up    Hepatitis C history and liver transplant history:  Continue hepatology follow-up    PAD  Secondary prevention.  No claudication symptoms at this time    Three-month follow-up to reassess diabetes control                        SCREENINGS   Colonoscopy 2015, return in 10  years   prostate testing 2016/8      Immunizations   Pneumovax 2019  PCV 10/29/2018   Flu: utd     Hepatitis B series up-to-date   Patient states he had tetanus shot around 2015

## 2020-02-26 ENCOUNTER — TELEPHONE (OUTPATIENT)
Dept: TRANSPLANT | Facility: CLINIC | Age: 65
End: 2020-02-26

## 2020-02-26 NOTE — LETTER
February 26, 2020    Vick Gonzalez  [address] [address]          Dear Vick Gonzalez:  MRN: [id number]    According to our records, you have missed your follow-up ultrasound 2/20/2020 for liver transplant.  While we understand that unexpected events can occur, we encourage you to please cancel ahead of time, if at all possible.  This will allow us to fill that appointment time with another patient.    Please call [phone] and ask for a  in order to re-schedule your appointment.      Sincerely,    Kandy Herrera, RN, BSN, [facility]  Liver Transplant Coordinator  [facility]  [address] [place] [id number]  [phone]

## 2020-03-03 ENCOUNTER — HOSPITAL ENCOUNTER (OUTPATIENT)
Dept: RADIOLOGY | Facility: HOSPITAL | Age: 65
Discharge: HOME | End: 2020-03-03
Attending: INTERNAL MEDICINE
Payer: MEDICARE

## 2020-03-03 DIAGNOSIS — Z94.4 LIVER TRANSPLANTED: ICD-10-CM

## 2020-03-03 PROCEDURE — 93975 VASCULAR STUDY: CPT | Mod: TC

## 2020-03-03 PROCEDURE — 76705 ECHO EXAM OF ABDOMEN: CPT | Mod: 26,59,, | Performed by: RADIOLOGY

## 2020-03-03 PROCEDURE — 93975 US LIVER TRANSPLANT POST: ICD-10-PCS | Mod: 26,,, | Performed by: RADIOLOGY

## 2020-03-03 PROCEDURE — 93975 VASCULAR STUDY: CPT | Mod: 26,,, | Performed by: RADIOLOGY

## 2020-03-03 PROCEDURE — 76705 US LIVER TRANSPLANT POST: ICD-10-PCS | Mod: 26,59,, | Performed by: RADIOLOGY

## 2020-03-05 ENCOUNTER — TELEPHONE (OUTPATIENT)
Dept: TRANSPLANT | Facility: CLINIC | Age: 65
End: 2020-03-05

## 2020-03-05 NOTE — LETTER
March 5, 2020    Vick Gonzalez  [address] [address]             [facility]plant  [address]  [place] [address]  Phone: [phone] Mr. Gonzalez,    Your ultrasound was reviewed and is stable. We will ask you to repeat another liver ultrasound in September.    Please let me know if you have any questions or concerns.    Sincerely,    Kandy Herrera RN, BSN, [facility]  Liver Transplant Coordinator

## 2020-03-05 NOTE — TELEPHONE ENCOUNTER
----- Message from Ambrose Maki MD sent at 3/5/2020  2:19 PM CST -----  Results ok. No action needed

## 2020-03-19 RX ORDER — CALCIUM CITRATE/VITAMIN D3 200MG-6.25
TABLET ORAL
Qty: 100 STRIP | Refills: 11 | Status: SHIPPED | OUTPATIENT
Start: 2020-03-19 | End: 2021-12-06

## 2020-03-31 ENCOUNTER — TELEPHONE (OUTPATIENT)
Dept: PAIN MEDICINE | Facility: CLINIC | Age: 65
End: 2020-03-31

## 2020-03-31 DIAGNOSIS — G95.9 LUMBAR MYELOPATHY: ICD-10-CM

## 2020-03-31 DIAGNOSIS — M51.36 DDD (DEGENERATIVE DISC DISEASE), LUMBAR: ICD-10-CM

## 2020-03-31 DIAGNOSIS — Z79.891 ENCOUNTER FOR MONITORING OPIOID MAINTENANCE THERAPY: ICD-10-CM

## 2020-03-31 DIAGNOSIS — M54.16 LUMBAR RADICULOPATHY: ICD-10-CM

## 2020-03-31 DIAGNOSIS — M96.1 POSTLAMINECTOMY SYNDROME OF LUMBAR REGION: ICD-10-CM

## 2020-03-31 DIAGNOSIS — M43.10 ACQUIRED SPONDYLOLISTHESIS: ICD-10-CM

## 2020-03-31 DIAGNOSIS — G89.4 CHRONIC PAIN SYNDROME: ICD-10-CM

## 2020-03-31 DIAGNOSIS — Z51.81 ENCOUNTER FOR MONITORING OPIOID MAINTENANCE THERAPY: ICD-10-CM

## 2020-03-31 RX ORDER — OXYCODONE HYDROCHLORIDE 15 MG/1
15 TABLET ORAL 4 TIMES DAILY PRN
Qty: 120 TABLET | Refills: 0 | Status: SHIPPED | OUTPATIENT
Start: 2020-03-31 | End: 2020-04-30

## 2020-03-31 NOTE — TELEPHONE ENCOUNTER
----- Message from Sivan Stapleton sent at 3/31/2020  8:58 AM CDT -----  Contact: VIRGIL GR [[id number]]  Name of Who is Calling: VIRGIL GR [[id number]]    What is the request in detail: Would like to inform provider that the oxyCODONE (ROXICODONE) 15 MG Tab prescription is missing the medically necessary information. Patient states he will be going out of town and needs it as soon as possible. Please contact to further discuss and advise      Can the clinic reply by [facility]: no    What Number to Call Back [facility] not [facility]: [phone]

## 2020-03-31 NOTE — TELEPHONE ENCOUNTER
----- Message from Yesica Murguia sent at 3/31/2020 11:35 AM CDT -----  Contact: VIRGIL GR [[id number]]  Name of Who is Calling: VIRGIL GR [[id number]]    What is the request in detail: VIRGIL GR [[id number] is requesting a call back in regards to medication   .]Please contact to further discuss and advise      Can the clinic reply by [facility]: no     What Number to [facility]:  [phone] (home)

## 2020-03-31 NOTE — TELEPHONE ENCOUNTER
Patient was given a prescription at his last office visit, however the pharmacy is refusing to fill it as it was documented that more than a 7 day supply was needed.

## 2020-03-31 NOTE — TELEPHONE ENCOUNTER
reviewed prescriptions that patient was provided at his last office visit It seems neither prescription had the wording on the prescription but it was filled on 03/02/2020 according to the pharmacy. While speaking to pharmacy she does not have the prescription for April in order to determine if they will fill it.     Contacted patient at preferred number, no answer, left voicemail with the above information. Prescription is due for refill on tomorrow, we are not authorizing early refills for opioids.

## 2020-03-31 NOTE — TELEPHONE ENCOUNTER
----- Message from Leonarda Goodwin sent at 3/31/2020 11:01 AM CDT -----  Contact: VIRGIL GR [[id number]]  Type: RX Refill Request    Who Called: VIRGIL GR [[id number]]    RX Name and Strength: oxyCODONE (ROXICODONE) 15 MG Tab    Preferred Pharmacy with phone number: [facility] - [facility] [address]    Best Call Back Number: [phone]    Additional Information: N/A

## 2020-04-01 ENCOUNTER — TELEPHONE (OUTPATIENT)
Dept: PAIN MEDICINE | Facility: CLINIC | Age: 65
End: 2020-04-01

## 2020-04-01 NOTE — TELEPHONE ENCOUNTER
Contacted/spoke to patient's wife Mrs. Gonzalez regarding appointment with Emma on 04/21/2020.     Patient's wife was informed due to COVID-19 Pandemic, in clinic visits have been postponed until further notice, however, appointment may be done through [employer] [facility] or by telephone call.     Patient's wife was also informed Emma will not be available for televisit as she has been deployed to another department due to COVID-19, therefore, the televisit will be with Dr. Penn.     Mrs. Gonzalez stated to schedule her  for a telephone call instead of [employer] video and to call him at [phone]    Mrs. Gonzalez stated she will relay message to her  and expressed understanding.

## 2020-04-06 RX ORDER — NIFEDIPINE 60 MG/1
60 TABLET, EXTENDED RELEASE ORAL DAILY
Qty: 90 TABLET | Refills: 1 | Status: SHIPPED | OUTPATIENT
Start: 2020-04-06 | End: 2020-09-27 | Stop reason: SDUPTHER

## 2020-04-08 ENCOUNTER — TELEPHONE (OUTPATIENT)
Dept: TRANSPLANT | Facility: CLINIC | Age: 65
End: 2020-04-08

## 2020-04-08 NOTE — TELEPHONE ENCOUNTER
Called pt x 2. Spoke with pt. States he forgot labs this week but agreed to have labs drawn 5/4/20.

## 2020-04-15 RX ORDER — LISINOPRIL 40 MG/1
TABLET ORAL
Qty: 90 TABLET | Refills: 3 | Status: SHIPPED | OUTPATIENT
Start: 2020-04-15 | End: 2021-07-14 | Stop reason: SDUPTHER

## 2020-04-17 ENCOUNTER — TELEPHONE (OUTPATIENT)
Dept: ADMINISTRATIVE | Facility: OTHER | Age: 65
End: 2020-04-17

## 2020-04-22 ENCOUNTER — TELEPHONE (OUTPATIENT)
Dept: PAIN MEDICINE | Facility: CLINIC | Age: 65
End: 2020-04-22

## 2020-04-22 NOTE — TELEPHONE ENCOUNTER
----- Message from Zoie Hameed sent at 4/22/2020  4:45 PM CDT -----  Contact: VIRGIL GR    Type:  Patient Returning Call    Who Called: VIRGIL GR     Who Left Message for Patient:Carolyn    Does the patient know what this is regarding?no    Best Call Back Number:[phone]    Additional Information:

## 2020-04-22 NOTE — TELEPHONE ENCOUNTER
Staff left detailed message regarding appointment with   Scheduled May 5,2020 at 9am unfortunately there's been a change in his schedule But we have a nurse practioner that can see the patient or patient can reschedule at a later date with Isamar also the visit would have to be in clinic or a virtual video through [facility] portal with smart device because as of May 1,2020 we will not be offering audio visits

## 2020-05-01 ENCOUNTER — TELEPHONE (OUTPATIENT)
Dept: PAIN MEDICINE | Facility: CLINIC | Age: 65
End: 2020-05-01

## 2020-05-01 NOTE — TELEPHONE ENCOUNTER
"Staff contacted the patient to verify medical urgency for his in person visit scheduled for 5/5/20 with Emma Batista NP for 1:20 pm. Patient can contact our office at [phone] to reschedule or cancel if needed. Patient has the option to schedule a telephone visit with Emma Batista Np being that he does not have my chart.       Patient states," No its not medically urgent I just need my medication. How do I go about getting my medication?"    Staff informed the patient that I can send a refill request through his chart and await approval from the on call doctor.     Patient accepted a telephone call being that he does not wish to have access to my chart portal and verbalized understanding of the above information.        "

## 2020-05-04 ENCOUNTER — OFFICE VISIT (OUTPATIENT)
Dept: FAMILY MEDICINE | Facility: CLINIC | Age: 65
End: 2020-05-04
Payer: MEDICARE

## 2020-05-04 ENCOUNTER — PATIENT OUTREACH (OUTPATIENT)
Dept: ADMINISTRATIVE | Facility: OTHER | Age: 65
End: 2020-05-04

## 2020-05-04 ENCOUNTER — LAB VISIT (OUTPATIENT)
Dept: LAB | Facility: HOSPITAL | Age: 65
End: 2020-05-04
Attending: INTERNAL MEDICINE
Payer: MEDICARE

## 2020-05-04 DIAGNOSIS — I73.9 PAD (PERIPHERAL ARTERY DISEASE): ICD-10-CM

## 2020-05-04 DIAGNOSIS — I10 HYPERTENSION, UNSPECIFIED TYPE: ICD-10-CM

## 2020-05-04 DIAGNOSIS — Z12.5 SCREENING FOR MALIGNANT NEOPLASM OF PROSTATE: ICD-10-CM

## 2020-05-04 DIAGNOSIS — E78.1 HYPERTRIGLYCERIDEMIA: ICD-10-CM

## 2020-05-04 DIAGNOSIS — G89.4 CHRONIC PAIN SYNDROME: ICD-10-CM

## 2020-05-04 DIAGNOSIS — Z86.19 HISTORY OF HEPATITIS C: ICD-10-CM

## 2020-05-04 DIAGNOSIS — E66.01 SEVERE OBESITY (BMI 35.0-35.9 WITH COMORBIDITY): ICD-10-CM

## 2020-05-04 DIAGNOSIS — D84.9 IMMUNOSUPPRESSION: ICD-10-CM

## 2020-05-04 DIAGNOSIS — E11.65 UNCONTROLLED TYPE 2 DIABETES MELLITUS WITH HYPERGLYCEMIA: Primary | ICD-10-CM

## 2020-05-04 DIAGNOSIS — E03.9 HYPOTHYROIDISM, UNSPECIFIED TYPE: ICD-10-CM

## 2020-05-04 DIAGNOSIS — Z94.4 LIVER TRANSPLANT RECIPIENT: ICD-10-CM

## 2020-05-04 DIAGNOSIS — N18.30 CKD (CHRONIC KIDNEY DISEASE), STAGE III: ICD-10-CM

## 2020-05-04 DIAGNOSIS — Z94.4 LIVER TRANSPLANTED: ICD-10-CM

## 2020-05-04 LAB
ALBUMIN SERPL BCP-MCNC: 3.5 G/DL (ref 3.5–5.2)
ALP SERPL-CCNC: 152 U/L (ref 55–135)
ALT SERPL W/O P-5'-P-CCNC: 24 U/L (ref 10–44)
ANION GAP SERPL CALC-SCNC: 10 MMOL/L (ref 8–16)
AST SERPL-CCNC: 16 U/L (ref 10–40)
BASOPHILS # BLD AUTO: 0.04 K/UL (ref 0–0.2)
BASOPHILS NFR BLD: 0.5 % (ref 0–1.9)
BILIRUB SERPL-MCNC: 0.3 MG/DL (ref 0.1–1)
BUN SERPL-MCNC: 12 MG/DL (ref 8–23)
CALCIUM SERPL-MCNC: 9.2 MG/DL (ref 8.7–10.5)
CHLORIDE SERPL-SCNC: 98 MMOL/L (ref 95–110)
CO2 SERPL-SCNC: 24 MMOL/L (ref 23–29)
CREAT SERPL-MCNC: 1.1 MG/DL (ref 0.5–1.4)
DIFFERENTIAL METHOD: ABNORMAL
EOSINOPHIL # BLD AUTO: 0.5 K/UL (ref 0–0.5)
EOSINOPHIL NFR BLD: 6.3 % (ref 0–8)
ERYTHROCYTE [DISTWIDTH] IN BLOOD BY AUTOMATED COUNT: 13 % (ref 11.5–14.5)
EST. GFR  (AFRICAN AMERICAN): >60 ML/MIN/1.73 M^2
EST. GFR  (NON AFRICAN AMERICAN): >60 ML/MIN/1.73 M^2
GLUCOSE SERPL-MCNC: 419 MG/DL (ref 70–110)
HCT VFR BLD AUTO: 44.4 % (ref 40–54)
HGB BLD-MCNC: 14.2 G/DL (ref 14–18)
IMM GRANULOCYTES # BLD AUTO: 0.02 K/UL (ref 0–0.04)
IMM GRANULOCYTES NFR BLD AUTO: 0.2 % (ref 0–0.5)
LYMPHOCYTES # BLD AUTO: 2.6 K/UL (ref 1–4.8)
LYMPHOCYTES NFR BLD: 32 % (ref 18–48)
MCH RBC QN AUTO: 28.2 PG (ref 27–31)
MCHC RBC AUTO-ENTMCNC: 32 G/DL (ref 32–36)
MCV RBC AUTO: 88 FL (ref 82–98)
MONOCYTES # BLD AUTO: 0.6 K/UL (ref 0.3–1)
MONOCYTES NFR BLD: 7.4 % (ref 4–15)
NEUTROPHILS # BLD AUTO: 4.3 K/UL (ref 1.8–7.7)
NEUTROPHILS NFR BLD: 53.6 % (ref 38–73)
NRBC BLD-RTO: 0 /100 WBC
PLATELET # BLD AUTO: 156 K/UL (ref 150–350)
PMV BLD AUTO: 13.7 FL (ref 9.2–12.9)
POTASSIUM SERPL-SCNC: 3.8 MMOL/L (ref 3.5–5.1)
PROT SERPL-MCNC: 7.8 G/DL (ref 6–8.4)
RBC # BLD AUTO: 5.03 M/UL (ref 4.6–6.2)
SODIUM SERPL-SCNC: 132 MMOL/L (ref 136–145)
TACROLIMUS BLD-MCNC: 4.6 NG/ML (ref 5–15)
WBC # BLD AUTO: 8.09 K/UL (ref 3.9–12.7)

## 2020-05-04 PROCEDURE — 85025 COMPLETE CBC W/AUTO DIFF WBC: CPT

## 2020-05-04 PROCEDURE — 80197 ASSAY OF TACROLIMUS: CPT

## 2020-05-04 PROCEDURE — 36415 COLL VENOUS BLD VENIPUNCTURE: CPT | Mod: PO

## 2020-05-04 PROCEDURE — 80053 COMPREHEN METABOLIC PANEL: CPT

## 2020-05-04 PROCEDURE — 99442 PR PHYSICIAN TELEPHONE EVALUATION 11-20 MIN: CPT | Mod: 95,,, | Performed by: FAMILY MEDICINE

## 2020-05-04 PROCEDURE — 99442 PR PHYSICIAN TELEPHONE EVALUATION 11-20 MIN: ICD-10-PCS | Mod: 95,,, | Performed by: FAMILY MEDICINE

## 2020-05-04 RX ORDER — METOPROLOL SUCCINATE 200 MG/1
200 TABLET, EXTENDED RELEASE ORAL DAILY
Qty: 30 TABLET | Refills: 11 | Status: SHIPPED | OUTPATIENT
Start: 2020-05-04 | End: 2020-05-04

## 2020-05-04 RX ORDER — METOPROLOL SUCCINATE 200 MG/1
200 TABLET, EXTENDED RELEASE ORAL DAILY
Qty: 30 TABLET | Refills: 11 | Status: SHIPPED | OUTPATIENT
Start: 2020-05-04 | End: 2021-05-25 | Stop reason: SDUPTHER

## 2020-05-04 NOTE — PROGRESS NOTES
Chart reviewed.   Immunizations: Triggered Imm Registry     Orders placed: n/a  Upcoming appts to satisfy NAMAN topics: n/a

## 2020-05-05 ENCOUNTER — OFFICE VISIT (OUTPATIENT)
Dept: PAIN MEDICINE | Facility: CLINIC | Age: 65
End: 2020-05-05
Payer: MEDICARE

## 2020-05-05 ENCOUNTER — TELEPHONE (OUTPATIENT)
Dept: PAIN MEDICINE | Facility: CLINIC | Age: 65
End: 2020-05-05

## 2020-05-05 DIAGNOSIS — Z98.1 S/P LUMBAR SPINAL FUSION: ICD-10-CM

## 2020-05-05 DIAGNOSIS — M54.16 LUMBAR RADICULOPATHY: ICD-10-CM

## 2020-05-05 DIAGNOSIS — M96.1 POSTLAMINECTOMY SYNDROME OF LUMBAR REGION: ICD-10-CM

## 2020-05-05 DIAGNOSIS — M51.36 DDD (DEGENERATIVE DISC DISEASE), LUMBAR: ICD-10-CM

## 2020-05-05 DIAGNOSIS — M53.3 SACROILIAC JOINT PAIN: ICD-10-CM

## 2020-05-05 DIAGNOSIS — G95.9 LUMBAR MYELOPATHY: ICD-10-CM

## 2020-05-05 DIAGNOSIS — M43.10 ACQUIRED SPONDYLOLISTHESIS: ICD-10-CM

## 2020-05-05 DIAGNOSIS — G89.4 CHRONIC PAIN SYNDROME: Primary | ICD-10-CM

## 2020-05-05 DIAGNOSIS — F11.90 CHRONIC, CONTINUOUS USE OF OPIOIDS: ICD-10-CM

## 2020-05-05 PROCEDURE — 99212 OFFICE O/P EST SF 10 MIN: CPT | Mod: PBBFAC | Performed by: NURSE PRACTITIONER

## 2020-05-05 PROCEDURE — 99213 PR OFFICE/OUTPT VISIT, EST, LEVL III, 20-29 MIN: ICD-10-PCS | Mod: S$PBB,,, | Performed by: NURSE PRACTITIONER

## 2020-05-05 PROCEDURE — 99213 OFFICE O/P EST LOW 20 MIN: CPT | Mod: S$PBB,,, | Performed by: NURSE PRACTITIONER

## 2020-05-05 PROCEDURE — 99999 PR PBB SHADOW E&M-EST. PATIENT-LVL II: ICD-10-PCS | Mod: PBBFAC,,, | Performed by: NURSE PRACTITIONER

## 2020-05-05 PROCEDURE — 99999 PR PBB SHADOW E&M-EST. PATIENT-LVL II: CPT | Mod: PBBFAC,,, | Performed by: NURSE PRACTITIONER

## 2020-05-05 NOTE — PROGRESS NOTES
Chronic Pain-Tele-Medicine-Established Note (Follow up visit)        The patient location is: Home  The chief complaint leading to consultation is: pain  Visit type: Virtual visit with audio only. The reason for the audio only service rather than synchronous audio and video virtual visit was related to technical difficulties or patient preference/necessity.  Total time spent with patient: 10 min  Each patient to whom he or she provides medical services by telemedicine is:  (1) informed of the relationship between the physician and patient and the respective role of any other health care provider with respect to management of the patient; and (2) notified that he or she may decline to receive medical services by telemedicine and may withdraw from such care at any time.      SUBJECTIVE:    Interval History 5/5/2020:  The patient returns for audio visit due to COVID. He continues to report low back pain with intermittent radiating pain down the back of both legs to his feet, right greater than left. His pain is worse with prolonged activity. He continues to take Gabapentin with benefit. He continues to take Oxycodone with benefit and without adverse effects. He denies any other health changes. His pain today is 5/10.    Interval History 1/28/2020:  Vick Gonzalez presents to the clinic for a follow-up appointment for lower back pain. He is s/p right SI joint injection on 12/2/2019. He reports 50% relief of his low back and buttock pain. He continues to report low back pain that radiates down the back of both legs to his feet, right greater than left. His pain is worse with prolonged walking and activity. He continues to report benefit with current medication regimen. He continues to take Gabapentin with benefit. He continues to take Oxycodone with benefit and with adverse effects. He denies any other health changes. His pain today is 6/10.        Pain Disability Index Review:  Last 3 PDI Scores 1/28/2020 11/5/2019  8/14/2019   Pain Disability Index (PDI) 9 16 32       Pain Medications:    - Opioids: Roxicodone (Oxycodone/Acetaminophen) 15 mg QID PRN pain    Others: Gabapentin 2400 mg daily    Opioid Contract: yes     report:  Reviewed and consistent with medication use as prescribed.    Pain Procedures:   1/15/15 Right TFESI @ L5 & S1   12/2/2019- Right SI joint injection- 50% relief     Physical Therapy/Home Exercise: yes, per self    Imaging:   Lumbar MRI 6/16/15  Narrative   Technique: Routine lumbar spine MRI performed without contrast.    Comparison: MRI 06/16/2015, CT 06/15/2015.    Findings:    There are postsurgical changes consistent with L5-S1 posterior instrumented fusion with bilateral pedicular screws, vertical stabilizing rods and an intervertebral disk spacer.  When compared to the MRI dated 06/16/2015 00:33, there are new postsurgical   changes consistent with left L5 hemilaminectomy.  There is a 2.5 cm ill-defined fluid collection at the site of hemilaminectomy that is not well evaluated due to the lack of IV contrast but appears to extend anteriorly into the spinal canal and into the   left foraminal zone.  There is as possible small fluid collection within the anterior right epidural space that may also due to compression of the adjacent spinal canal.  There is stable grade I anterolistheses of L5 on S1 with persistent high signal   intensity adjacent to the left lateral aspect of the disk spacer that demonstrates moderate associated bone marrow edema of the adjacent vertebral endplates, findings that are when compared to the previous exam.  Note is made that there is an apparent   high signal intensity within the nerve roots posterior to the L5-S1 level that was not definitely present on the previous exam.    There is mild persistent height loss loss involving the L5 vertebral body, likely degenerative in nature.  Remaining vertebral body heights are well-maintained without findings to suggest acute  fracture.    There is mild intervertebral disk spacing and desiccation at L4-L5 with a small circumferential disk bulge. No disk protrusion or extrusion. There is moderate bilateral facet arthropathy and mild bilateral uncomfortable and buckling at this level.  These   findings contribute to mild spinal canal stenosis and mild bilateral foraminal narrowing.    Noting aforementioned postsurgical changes, there is persistent severe bilateral foraminal narrowing that is difficult to accurately characterize due to adjacent artifact.    There is edema anterior to the L4, L5 and S1 vertebral bodies, presumably postsurgical in nature.  No drainable fluid collections identified. Visualized retroperitoneal organs are unremarkable.   Impression       Postsurgical changes of L5-S1 posterior spinal fusion and left L5 hemilaminectomy. There is an ill-defined fluid collection at the site of hemilaminectomy that is not well evaluated due to the lack of IV contrast but appears to extend anteriorly with   suspected resulting mass effect on the spinal canal and the left foraminal zone.  There is a suspected small fluid collection within the anterior right epidural space that may contribute to additional mass effect on the adjacent spinal canal. While   findings may represent sequela of expected postsurgical changes, continued follow-up is advised to ensure improvement and/or resolution.    Persistent abnormal high signal intensity adjacent to the left lateral aspect of the intervertebral disk spacer with moderate associated bone marrow edema of the adjacent vertebral endplates. Findings are similar when compared to the previous exam could   represent postsurgical changes. Early infection could have a similar appearance and is possible. Continued follow-up is advised.    Apparent high signal intensity within the nerve roots posterior to the L5-S1 level that was not definitely present on the previous exam. Findings may be artifactual  in nature however, sequela of nerve root inflammation/edema is possible noting recent   surgery.    This report has been flagged in the Epic medical record     .  CMP  Sodium   Date Value Ref Range Status   05/04/2020 132 (L) 136 - 145 mmol/L Final     Potassium   Date Value Ref Range Status   05/04/2020 3.8 3.5 - 5.1 mmol/L Final     Chloride   Date Value Ref Range Status   05/04/2020 98 95 - 110 mmol/L Final     CO2   Date Value Ref Range Status   05/04/2020 24 23 - 29 mmol/L Final     Glucose   Date Value Ref Range Status   05/04/2020 419 (H) 70 - 110 mg/dL Final     BUN, Bld   Date Value Ref Range Status   05/04/2020 12 8 - 23 mg/dL Final     Creatinine   Date Value Ref Range Status   05/04/2020 1.1 0.5 - 1.4 mg/dL Final     Calcium   Date Value Ref Range Status   05/04/2020 9.2 8.7 - 10.5 mg/dL Final     Total Protein   Date Value Ref Range Status   05/04/2020 7.8 6.0 - 8.4 g/dL Final     Albumin   Date Value Ref Range Status   05/04/2020 3.5 3.5 - 5.2 g/dL Final   07/25/2018 4.5 3.6 - 5.1 g/dL Final     Comment:     @ Test Performed By:  [employer] [facility]  Marquis Wright M.D., Ph.D.,   [address] [place] [id number]  [facility]  [id number]       Total Bilirubin   Date Value Ref Range Status   05/04/2020 0.3 0.1 - 1.0 mg/dL Final     Comment:     For infants and newborns, interpretation of results should be based  on gestational age, weight and in agreement with clinical  observations.  Premature Infant recommended reference ranges:  Up to 24 hours.............<8.0 mg/dL  Up to 48 hours............<12.0 mg/dL  3-5 days..................<15.0 mg/dL  6-29 days.................<15.0 mg/dL       Alkaline Phosphatase   Date Value Ref Range Status   05/04/2020 152 (H) 55 - 135 U/L Final     AST   Date Value Ref Range Status   05/04/2020 16 10 - 40 U/L Final     ALT   Date Value Ref Range Status   05/04/2020 24 10 - 44 U/L Final     Anion Gap   Date Value Ref Range Status  "  05/04/2020 10 8 - 16 mmol/L Final     eGFR if    Date Value Ref Range Status   05/04/2020 >60.0 >60 mL/min/1.73 m^2 Final     eGFR if non    Date Value Ref Range Status   05/04/2020 >60.0 >60 mL/min/1.73 m^2 Final     Comment:     Calculation used to obtain the estimated glomerular filtration  rate (eGFR) is the CKD-EPI equation.            Allergies:   Review of patient's allergies indicates:   Allergen Reactions    Naproxen      Other reaction(s): problems w/liver/kid    Oxaprozin      Other reaction(s): cause problems w/kid/liver       Current Medications:   Current Outpatient Medications   Medication Sig Dispense Refill    allopurinol (ZYLOPRIM) 100 MG tablet TAKE 1 TABLET BY MOUTH TWICE A DAY 60 tablet 7    amitriptyline (ELAVIL) 50 MG tablet Take 1 tablet (50 mg total) by mouth every evening. For nerve pain and sleep 30 tablet 11    BD ULTRA-FINE MENDY PEN NEEDLES 32 gauge x 5/32" Ndle       calcium carbonate-vitamin D3 (CALTRATE 600 + D) 600 mg(1,500mg) -400 unit Chew       colchicine 0.6 mg tablet Take 1 tablet (0.6 mg total) by mouth 2 (two) times daily. 30 tablet 2    dulaglutide (TRULICITY) 1.5 mg/0.5 mL PnIj Inject 1.5 mg (1 syringe) into the skin every 7 days. 2 mL 11    gabapentin (NEURONTIN) 800 MG tablet TAKE 1 TABLET BY MOUTH THREE TIMES A DAY 90 tablet 11    insulin aspart U-100 (NOVOLOG FLEXPEN U-100 INSULIN) 100 unit/mL (3 mL) InPn pen Inject 20 Units into the skin 3 (three) times daily with meals. 15 mL 11    insulin detemir U-100 (LEVEMIR FLEXTOUCH) 100 unit/mL (3 mL) SubQ InPn pen Inject 70 Units into the skin every evening. 15 mL 11    lancets Misc 1 each by Misc.(Non-Drug; Combo Route) route 4 (four) times daily before meals and nightly. 200 each 11    levothyroxine (SYNTHROID) 88 MCG tablet TAKE 1 TABLET (88 MCG TOTAL) BY MOUTH ONCE DAILY. 90 tablet 4    lisinopriL (PRINIVIL,ZESTRIL) 40 MG tablet TAKE 1 TABLET BY MOUTH EVERY DAY 90 tablet 3    " "metoprolol succinate (TOPROL-XL) 200 MG 24 hr tablet TAKE 1 TABLET (200 MG TOTAL) BY MOUTH ONCE DAILY. 30 tablet 11    multivitamin (THERAGRAN) per tablet Take 1 tablet by mouth.      NIFEdipine (ADALAT CC) 60 MG TbSR TAKE 1 TABLET (60 MG TOTAL) BY MOUTH ONCE DAILY. 90 tablet 1    NOVOFINE PLUS 32 gauge x 1/6" Ndle       ondansetron (ZOFRAN-ODT) 4 MG TbDL Take 1 tablet (4 mg total) by mouth every 8 (eight) hours as needed (n/v). 10 tablet 0    pantoprazole (PROTONIX) 20 MG tablet Take 1 tablet (20 mg total) by mouth once daily. 30 tablet 0    rosuvastatin (CRESTOR) 5 MG tablet Take 1 tablet (5 mg total) by mouth once daily. 90 tablet 3    sertraline (ZOLOFT) 100 MG tablet TAKE 1 TABLET (100 MG TOTAL) BY MOUTH ONCE DAILY. 30 tablet 7    sildenafil (VIAGRA) 100 MG tablet Take 1 tablet (100 mg total) by mouth once as needed for Erectile Dysfunction. 30 tablet 11    tacrolimus (PROGRAF) 1 MG Cap TAKE 2 CAPSULES (2 MG TOTAL) BY MOUTH IN THE MORNING & TAKE 1 CAPSULE (1 MG TOTAL) IN THE EVENING 90 capsule 11    TRUE METRIX GLUCOSE METER Misc TEST 4 (FOUR) TIMES DAILY BEFORE MEALS AND NIGHTLY. 1 each 0    TRUE METRIX GLUCOSE TEST STRIP Strp USE 3 TIMES DAILY TO TEST BLOOD GLUCOSE LEVEL 100 strip 11     No current facility-administered medications for this visit.        REVIEW OF SYSTEMS:    GENERAL:  No weight loss, malaise or fevers.  HEENT:  Negative for frequent or significant headaches.  NECK:  Negative for lumps, goiter, pain and significant neck swelling.  RESPIRATORY:  Negative for cough, wheezing or shortness of breath.  CARDIOVASCULAR:  Negative for chest pain, leg swelling or palpitations. H/O hypertension.  GI:  Negative for abdominal discomfort, blood in stools or black stools or change in bowel habits.  MUSCULOSKELETAL:  See HPI.  SKIN:  Negative for lesions, rash, and itching.  PSYCH:  Negative for sleep disturbance, mood disorder and recent psychosocial stressors.  HEMATOLOGY/LYMPHOLOGY:  Negative " for prolonged bleeding, bruising easily or swollen nodes. H/O liver transplant.  NEURO:   No history of headaches, syncope, paralysis, seizures or tremors.  ENDO: Diabetes.   All other reviewed and negative other than HPI.    Past Medical History:  Past Medical History:   Diagnosis Date    Anemia     Anxiety     Chronic pain syndrome 7/13/2011    CKD (chronic kidney disease), stage III     Diabetes mellitus type II, uncontrolled     Discitis of lumbosacral region 1/16/2015    ED (erectile dysfunction)     Encounter for blood transfusion     Genital herpes     Gout, arthritis     History of alcohol abuse     History of hepatitis C, s/p successful Rx w/ SVR24 (cure) - 5/2018 8/23/2011    Completed 24wks Epclusa + RBV w/SVR12 - 2/2018  -     History of positive PPD, treatment status unknown     Pulmonary granulomas, negative sputum cultures for AFB and indeterminate quantferon test    History of substance abuse     Hypertension     Hypothyroidism     Liver replaced by transplant 8/23/2011    DATE: 12/16/2013  LIVER BIOPSY : REASON:  hep C staging  PATHOLOGY COMMITTEE NOTE/PLAN: :grade  1 / stage 1        Pancreatitis 2016    Peptic ulcer disease        Past Surgical History:  Past Surgical History:   Procedure Laterality Date    CHOLECYSTECTOMY      INJECTION OF JOINT Right 12/2/2019    Procedure: INJECTION, JOINT SI;  Surgeon: Thu Penn MD;  [facility];  Service: Pain Management;  Laterality: Right;  RT SI JNT INJ    LIVER TRANSPLANT  06/2010    SPINE SURGERY         Family History:  Family History   Problem Relation Age of Onset    Cancer Mother     Diabetes Mother     Heart disease Mother     Diabetes Sister     Cancer Maternal Uncle 82        colon CA    Melanoma Neg Hx     Psoriasis Neg Hx     Lupus Neg Hx     Eczema Neg Hx     Acne Neg Hx        Social History:  Social History     Socioeconomic History    Marital status:      Spouse name: Not on file     Number of children: Not on file    Years of education: Not on file    Highest education level: Not on file   Occupational History    Not on file   Social Needs    Financial resource strain: Not on file    Food insecurity:     Worry: Not on file     Inability: Not on file    Transportation needs:     Medical: Not on file     Non-medical: Not on file   Tobacco Use    Smoking status: Former Smoker    Smokeless tobacco: Never Used   Substance and Sexual Activity    Alcohol use: No     Comment: over 5 years ago, none currently    Drug use: No    Sexual activity: Not on file   Lifestyle    Physical activity:     Days per week: Not on file     Minutes per session: Not on file    Stress: Not on file   Relationships    Social connections:     Talks on phone: Not on file     Gets together: Not on file     Attends Yazidism service: Not on file     Active member of club or organization: Not on file     Attends meetings of clubs or organizations: Not on file     Relationship status: Not on file   Other Topics Concern    Not on file   Social History Narrative    Not on file       OBJECTIVE:    PHYSICAL EXAMINATION:  Voice normal.  Normal affect without evidence of distress.    ASSESSMENT: 64 y.o. year old male with lower back and right leg pain, consistent with the following diagnoses:     1. Chronic pain syndrome     2. Postlaminectomy syndrome of lumbar region     3. Lumbar radiculopathy     4. Acquired spondylolisthesis     5. DDD (degenerative disc disease), lumbar     6. Lumbar myelopathy     7. S/P lumbar spinal fusion     8. Sacroiliac joint pain           PLAN:     - Previous imaging was reviewed and discussed with the patient today. Recent labs reviewed with patient today.     - We can repeat SI joint injection as needed.     - Consider ALCIDES in the future. He would like to wait at this time.     - Patient can continue Roxicodone 15 mg QID PRN pain, #120. Refill provided today.     - The patient is here  today for a refill of current pain medications and they believe these provide effective pain control and improvements in quality of life.  The patient notes no serious side effects, and feels the benefits outweigh the risks.  The patient was reminded of the pain contract that they signed previously as well as the risks and benefits of the medication including possible death.  The updated [place] Board of Pharmacy prescription monitoring program was reviewed, and the patient has been found to be compliant with current treatment plan. Medication management provided by Dr. Alfred in absence of Dr. Penn.     - Continue Gabapentin 800 mg TID.     - Pain Contract signed 8/14/2019.     - Serum screen from 1/28/2020 was negative for opioids. Will repeat at next visit.     - The patient will continue a home exercise routine to help with pain and strengthening.      - RTC in 1 month.    - Counseled patient regarding the importance of constant sleeping habits and physical therapy.     The above plan and management options were discussed at length with patient. Patient is in agreement with the above and verbalized understanding.    Emma Batista NP  05/05/2020

## 2020-05-05 NOTE — TELEPHONE ENCOUNTER
----- Message from Roxy Campa sent at 5/5/2020  4:21 PM CDT -----  Contact: VIRGIL GR [[id number]]   Name of Who is Calling:     What is the request in detail: patient request call back in reference to medication percocet //questions/concerns  Please contact to further discuss and advise      Can the clinic reply by [facility]: no     What Number to Call Back if not in [facility]:  [phone]

## 2020-05-05 NOTE — TELEPHONE ENCOUNTER
Staff contacted and spoke with patient in regards to his message.    Staff informed patient that MITCHEL has received his message and stated to staff that she will contact patient back for his appointment.    Patient went on stating that provider never called him because he's been waiting by his phone.    Staff once again informed patient of the information that provider stated she will contact patient to have via appointment.

## 2020-05-05 NOTE — TELEPHONE ENCOUNTER
----- Message from Emma Batista NP sent at 5/5/2020  3:42 PM CDT -----  Can you call him and schedule a 1 month follow up visit, in office? I was having trouble scheduling. He prefers morning appointments.     Thanks,   Emma

## 2020-05-05 NOTE — TELEPHONE ENCOUNTER
----- Message from Marielena Fofana sent at 5/5/2020  2:06 PM CDT -----  Contact: VIRGIL GR [[id number]]    Type:  Patient Returning Call    Who Called: VIRGIL GR [[id number]]    Who Left Message for Patient: KUNAL Batista     Does the patient know what this is regarding?: Y    Can the clinic reply in [facility]: No    Best Call Back Number: [phone]    Additional Information: He states he has had his phone in his hand all day, and he doesn't believe the staff reached out to him for the visit.

## 2020-05-05 NOTE — TELEPHONE ENCOUNTER
Patient was contacted and informed that  is the covering physician for  and the refill request is pending his approval once authorized it will be sent to his pharmacy. Patient verbalized understanding

## 2020-05-05 NOTE — TELEPHONE ENCOUNTER
----- Message from Tarsha Ibarra sent at 5/5/2020  2:25 PM CDT -----  Contact: Pt    Name of Who is Calling:VIRGIL GR [[id number]]    What is the request in detail: Pt would like a call back , states he did not receive a call from the staff Please contact to further discuss and advise    Can the clinic reply by [facility]:  No      What Number to Call Back if not in [facility]: [phone]

## 2020-05-06 ENCOUNTER — TELEPHONE (OUTPATIENT)
Dept: TRANSPLANT | Facility: CLINIC | Age: 65
End: 2020-05-06

## 2020-05-06 ENCOUNTER — TELEPHONE (OUTPATIENT)
Dept: PAIN MEDICINE | Facility: CLINIC | Age: 65
End: 2020-05-06

## 2020-05-06 DIAGNOSIS — K74.00 HEPATIC FIBROSIS: Primary | ICD-10-CM

## 2020-05-06 RX ORDER — OXYCODONE HYDROCHLORIDE 15 MG/1
15 TABLET ORAL EVERY 6 HOURS PRN
Qty: 120 TABLET | Refills: 0 | Status: SHIPPED | OUTPATIENT
Start: 2020-05-06 | End: 2020-06-02 | Stop reason: SDUPTHER

## 2020-05-06 RX ORDER — GABAPENTIN 800 MG/1
800 TABLET ORAL 3 TIMES DAILY
Qty: 90 TABLET | Refills: 11 | Status: SHIPPED | OUTPATIENT
Start: 2020-05-06 | End: 2020-05-18 | Stop reason: SDUPTHER

## 2020-05-06 NOTE — TELEPHONE ENCOUNTER
----- Message from Anitha Blum sent at 5/6/2020  9:34 AM CDT -----  Contact: VIRGIL GR [[id number]]      MEDICATION  REFILL  REQUEST      Please refill the medication(s) listed below. Please call the patient when the prescription(s) is ready for  at this phone number   [phone]      Medication #1  gabapentin (NEURONTIN) 800 MG tablet    Medication #2  Roxicodone (Oxycodone/Acetaminophen) 15 mg     Preferred Pharmacy:   [facility] ([place]) - [address]      [phone] (Phone)  [phone] (Fax)

## 2020-05-06 NOTE — TELEPHONE ENCOUNTER
Staff contacted the patient to inform him that his request for a refill has been approved and e prescribed to his requested [facility] pharmacy.     Patient verbalized understanding and expressed thanks.

## 2020-05-06 NOTE — LETTER
May 6, 2020    Vick Carlos  [address] [address]          Dear Vick Gonzalez:  MRN: [id number]    This is a follow up to your recent labs, your lab results were stable.  There are no medicine changes.  Please have your labs drawn again on 7/27/20.      If you cannot have your labs drawn on the scheduled date, it is your responsibility to call the transplant department to reschedule.  To reschedule or make an appointment, please as to speak to or leave a message for my assistant, Madison Pearce or Laura, at [phone].  When leaving a message for Madison Pearce Angela or myself, we ask that you leave a brief message regarding your request.    Sincerely,      Kandy Herrera, RN, BSN, [facility]  Liver Transplant Coordinator  [facility]  [address] [place] [id number]  [phone]

## 2020-05-06 NOTE — TELEPHONE ENCOUNTER
----- Message from Noah Mendes, Patient Care Assistant sent at 5/6/2020 10:23 AM CDT -----  Contact: VIRGIL GR [[id number]]  Can the clinic reply in [facility]: No    Please refill the medication(s) listed below. The patient can be reached at this phone number once it is called into the pharmacy.[phone]    Medication #1oxycodone (ROXICODONE) 15 MG Tab    Medication #2    Preferred Pharmacy:   [facility]

## 2020-05-06 NOTE — TELEPHONE ENCOUNTER
Patient requesting refill on 05/06/20  Last office visit 5/5/20   shows last refill on 3/31/20  Patient does have a pain contract on file with [facility]  Patient last UDS 1/28/20 (status in progress)    Please review and advise in Dr. Penn absence this week.

## 2020-05-06 NOTE — TELEPHONE ENCOUNTER
Letter sent, labs stable and no medication changes are needed. Repeat labs due 7/27/20 per protocol.

## 2020-05-18 ENCOUNTER — TELEPHONE (OUTPATIENT)
Dept: FAMILY MEDICINE | Facility: CLINIC | Age: 65
End: 2020-05-18

## 2020-05-18 NOTE — TELEPHONE ENCOUNTER
----- Message from Gris Mayorga sent at 5/18/2020 11:39 AM CDT -----  Contact: wife  Type:  RX Refill Request    Who Called: wife  Refill or New Rx: Refill  RX Name and Strength: gabapentin (NEURONTIN) 800 MG tablet  Is this a 30 day or 90 day RX: 90 Day  Preferred Pharmacy with phone number: [facility]  Would the patient rather a call back or a response via [facility]? Call back  Best Call Back Number: [phone]  Additional Information: Please do not send to [facility]

## 2020-06-01 ENCOUNTER — PATIENT OUTREACH (OUTPATIENT)
Dept: ADMINISTRATIVE | Facility: OTHER | Age: 65
End: 2020-06-01

## 2020-06-02 ENCOUNTER — OFFICE VISIT (OUTPATIENT)
Dept: PAIN MEDICINE | Facility: CLINIC | Age: 65
End: 2020-06-02
Payer: MEDICARE

## 2020-06-02 VITALS
TEMPERATURE: 99 F | OXYGEN SATURATION: 100 % | BODY MASS INDEX: 36.43 KG/M2 | HEIGHT: 72 IN | DIASTOLIC BLOOD PRESSURE: 104 MMHG | WEIGHT: 268.94 LBS | SYSTOLIC BLOOD PRESSURE: 162 MMHG | HEART RATE: 109 BPM | RESPIRATION RATE: 18 BRPM

## 2020-06-02 DIAGNOSIS — M51.36 DDD (DEGENERATIVE DISC DISEASE), LUMBAR: ICD-10-CM

## 2020-06-02 DIAGNOSIS — M96.1 POSTLAMINECTOMY SYNDROME OF LUMBAR REGION: ICD-10-CM

## 2020-06-02 DIAGNOSIS — M54.16 LUMBAR RADICULOPATHY: ICD-10-CM

## 2020-06-02 DIAGNOSIS — Z79.891 ENCOUNTER FOR MONITORING OPIOID MAINTENANCE THERAPY: ICD-10-CM

## 2020-06-02 DIAGNOSIS — Z98.1 S/P LUMBAR SPINAL FUSION: ICD-10-CM

## 2020-06-02 DIAGNOSIS — M53.3 SACROILIAC JOINT PAIN: ICD-10-CM

## 2020-06-02 DIAGNOSIS — M43.10 ACQUIRED SPONDYLOLISTHESIS: ICD-10-CM

## 2020-06-02 DIAGNOSIS — Z51.81 ENCOUNTER FOR MONITORING OPIOID MAINTENANCE THERAPY: ICD-10-CM

## 2020-06-02 DIAGNOSIS — G89.4 CHRONIC PAIN SYNDROME: ICD-10-CM

## 2020-06-02 DIAGNOSIS — G95.9 LUMBAR MYELOPATHY: ICD-10-CM

## 2020-06-02 DIAGNOSIS — G89.4 CHRONIC PAIN SYNDROME: Primary | ICD-10-CM

## 2020-06-02 PROCEDURE — 99999 PR PBB SHADOW E&M-EST. PATIENT-LVL III: CPT | Mod: PBBFAC,,, | Performed by: NURSE PRACTITIONER

## 2020-06-02 PROCEDURE — 99214 OFFICE O/P EST MOD 30 MIN: CPT | Mod: S$PBB,,, | Performed by: NURSE PRACTITIONER

## 2020-06-02 PROCEDURE — 99213 OFFICE O/P EST LOW 20 MIN: CPT | Mod: PBBFAC | Performed by: NURSE PRACTITIONER

## 2020-06-02 PROCEDURE — 99214 PR OFFICE/OUTPT VISIT, EST, LEVL IV, 30-39 MIN: ICD-10-PCS | Mod: S$PBB,,, | Performed by: NURSE PRACTITIONER

## 2020-06-02 PROCEDURE — 99999 PR PBB SHADOW E&M-EST. PATIENT-LVL III: ICD-10-PCS | Mod: PBBFAC,,, | Performed by: NURSE PRACTITIONER

## 2020-06-02 RX ORDER — OXYCODONE HYDROCHLORIDE 15 MG/1
15 TABLET ORAL EVERY 6 HOURS PRN
Qty: 120 TABLET | Refills: 0 | Status: SHIPPED | OUTPATIENT
Start: 2020-06-05 | End: 2020-07-02 | Stop reason: SDUPTHER

## 2020-06-02 NOTE — PROGRESS NOTES
Chronic Pain- -Established Note (Follow up visit)        SUBJECTIVE:    Interval History 6/2/2020:  The patient returns to clinic today for follow up of low back pain. He continues to report low back pain. He does report radiating pain down the back of both legs to his feet, right greater than left. His pain is worse with prolonged standing, walking, and activity. He continues to report benefit with current medication regimen. He is currently taking Gabapentin and Oxycodone. He denies any other health changes. His pain today is 6/10.    Interval History 5/5/2020:  The patient returns for audio visit due to COVID. He continues to report low back pain with intermittent radiating pain down the back of both legs to his feet, right greater than left. His pain is worse with prolonged activity. He continues to take Gabapentin with benefit. He continues to take Oxycodone with benefit and without adverse effects. He denies any other health changes. His pain today is 5/10.    Interval History 1/28/2020:  Vick Gonzalez presents to the clinic for a follow-up appointment for lower back pain. He is s/p right SI joint injection on 12/2/2019. He reports 50% relief of his low back and buttock pain. He continues to report low back pain that radiates down the back of both legs to his feet, right greater than left. His pain is worse with prolonged walking and activity. He continues to report benefit with current medication regimen. He continues to take Gabapentin with benefit. He continues to take Oxycodone with benefit and with adverse effects. He denies any other health changes. His pain today is 6/10.        Pain Disability Index Review:  Last 3 PDI Scores 6/2/2020 1/28/2020 11/5/2019   Pain Disability Index (PDI) 42 9 16       Pain Medications:    - Opioids: Roxicodone (Oxycodone/Acetaminophen) 15 mg QID PRN pain    Others: Gabapentin 2400 mg daily    Opioid Contract: yes     report:  Reviewed and consistent with medication  use as prescribed.    Pain Procedures:   1/15/15 Right TFESI @ L5 & S1   12/2/2019- Right SI joint injection- 50% relief     Physical Therapy/Home Exercise: yes, per self    Imaging:   Lumbar MRI 6/16/15  Narrative   Technique: Routine lumbar spine MRI performed without contrast.    Comparison: MRI 06/16/2015, CT 06/15/2015.    Findings:    There are postsurgical changes consistent with L5-S1 posterior instrumented fusion with bilateral pedicular screws, vertical stabilizing rods and an intervertebral disk spacer.  When compared to the MRI dated 06/16/2015 00:33, there are new postsurgical   changes consistent with left L5 hemilaminectomy.  There is a 2.5 cm ill-defined fluid collection at the site of hemilaminectomy that is not well evaluated due to the lack of IV contrast but appears to extend anteriorly into the spinal canal and into the   left foraminal zone.  There is as possible small fluid collection within the anterior right epidural space that may also due to compression of the adjacent spinal canal.  There is stable grade I anterolistheses of L5 on S1 with persistent high signal   intensity adjacent to the left lateral aspect of the disk spacer that demonstrates moderate associated bone marrow edema of the adjacent vertebral endplates, findings that are when compared to the previous exam.  Note is made that there is an apparent   high signal intensity within the nerve roots posterior to the L5-S1 level that was not definitely present on the previous exam.    There is mild persistent height loss loss involving the L5 vertebral body, likely degenerative in nature.  Remaining vertebral body heights are well-maintained without findings to suggest acute fracture.    There is mild intervertebral disk spacing and desiccation at L4-L5 with a small circumferential disk bulge. No disk protrusion or extrusion. There is moderate bilateral facet arthropathy and mild bilateral uncomfortable and buckling at this level.   These   findings contribute to mild spinal canal stenosis and mild bilateral foraminal narrowing.    Noting aforementioned postsurgical changes, there is persistent severe bilateral foraminal narrowing that is difficult to accurately characterize due to adjacent artifact.    There is edema anterior to the L4, L5 and S1 vertebral bodies, presumably postsurgical in nature.  No drainable fluid collections identified. Visualized retroperitoneal organs are unremarkable.   Impression       Postsurgical changes of L5-S1 posterior spinal fusion and left L5 hemilaminectomy. There is an ill-defined fluid collection at the site of hemilaminectomy that is not well evaluated due to the lack of IV contrast but appears to extend anteriorly with   suspected resulting mass effect on the spinal canal and the left foraminal zone.  There is a suspected small fluid collection within the anterior right epidural space that may contribute to additional mass effect on the adjacent spinal canal. While   findings may represent sequela of expected postsurgical changes, continued follow-up is advised to ensure improvement and/or resolution.    Persistent abnormal high signal intensity adjacent to the left lateral aspect of the intervertebral disk spacer with moderate associated bone marrow edema of the adjacent vertebral endplates. Findings are similar when compared to the previous exam could   represent postsurgical changes. Early infection could have a similar appearance and is possible. Continued follow-up is advised.    Apparent high signal intensity within the nerve roots posterior to the L5-S1 level that was not definitely present on the previous exam. Findings may be artifactual in nature however, sequela of nerve root inflammation/edema is possible noting recent   surgery.    This report has been flagged in the Epic medical record     .  CMP  Sodium   Date Value Ref Range Status   05/04/2020 132 (L) 136 - 145 mmol/L Final     Potassium    Date Value Ref Range Status   05/04/2020 3.8 3.5 - 5.1 mmol/L Final     Chloride   Date Value Ref Range Status   05/04/2020 98 95 - 110 mmol/L Final     CO2   Date Value Ref Range Status   05/04/2020 24 23 - 29 mmol/L Final     Glucose   Date Value Ref Range Status   05/04/2020 419 (H) 70 - 110 mg/dL Final     BUN, Bld   Date Value Ref Range Status   05/04/2020 12 8 - 23 mg/dL Final     Creatinine   Date Value Ref Range Status   05/04/2020 1.1 0.5 - 1.4 mg/dL Final     Calcium   Date Value Ref Range Status   05/04/2020 9.2 8.7 - 10.5 mg/dL Final     Total Protein   Date Value Ref Range Status   05/04/2020 7.8 6.0 - 8.4 g/dL Final     Albumin   Date Value Ref Range Status   05/04/2020 3.5 3.5 - 5.2 g/dL Final   07/25/2018 4.5 3.6 - 5.1 g/dL Final     Comment:     @ Test Performed By:  [facility]  Marquis Wright M.D., Ph.D.,   [address] [place] [id number]  [facility]  [id number]       Total Bilirubin   Date Value Ref Range Status   05/04/2020 0.3 0.1 - 1.0 mg/dL Final     Comment:     For infants and newborns, interpretation of results should be based  on gestational age, weight and in agreement with clinical  observations.  Premature Infant recommended reference ranges:  Up to 24 hours.............<8.0 mg/dL  Up to 48 hours............<12.0 mg/dL  3-5 days..................<15.0 mg/dL  6-29 days.................<15.0 mg/dL       Alkaline Phosphatase   Date Value Ref Range Status   05/04/2020 152 (H) 55 - 135 U/L Final     AST   Date Value Ref Range Status   05/04/2020 16 10 - 40 U/L Final     ALT   Date Value Ref Range Status   05/04/2020 24 10 - 44 U/L Final     Anion Gap   Date Value Ref Range Status   05/04/2020 10 8 - 16 mmol/L Final     eGFR if    Date Value Ref Range Status   05/04/2020 >60.0 >60 mL/min/1.73 m^2 Final     eGFR if non    Date Value Ref Range Status   05/04/2020 >60.0 >60 mL/min/1.73 m^2 Final     Comment:      "Calculation used to obtain the estimated glomerular filtration  rate (eGFR) is the CKD-EPI equation.            Allergies:   Review of patient's allergies indicates:   Allergen Reactions    Naproxen      Other reaction(s): problems w/liver/kid    Oxaprozin      Other reaction(s): cause problems w/kid/liver       Current Medications:   Current Outpatient Medications   Medication Sig Dispense Refill    allopurinol (ZYLOPRIM) 100 MG tablet TAKE 1 TABLET BY MOUTH TWICE A DAY 60 tablet 7    amitriptyline (ELAVIL) 50 MG tablet Take 1 tablet (50 mg total) by mouth every evening. For nerve pain and sleep 30 tablet 11    BD ULTRA-FINE MENDY PEN NEEDLES 32 gauge x 5/32" Ndle       calcium carbonate-vitamin D3 (CALTRATE 600 + D) 600 mg(1,500mg) -400 unit Chew       colchicine 0.6 mg tablet Take 1 tablet (0.6 mg total) by mouth 2 (two) times daily. 30 tablet 2    dulaglutide (TRULICITY) 1.5 mg/0.5 mL PnIj Inject 1.5 mg (1 syringe) into the skin every 7 days. 2 mL 11    gabapentin (NEURONTIN) 800 MG tablet Take 1 tablet (800 mg total) by mouth 3 (three) times daily. 90 tablet 11    insulin aspart U-100 (NOVOLOG FLEXPEN U-100 INSULIN) 100 unit/mL (3 mL) InPn pen Inject 20 Units into the skin 3 (three) times daily with meals. 15 mL 11    insulin detemir U-100 (LEVEMIR FLEXTOUCH) 100 unit/mL (3 mL) SubQ InPn pen Inject 70 Units into the skin every evening. 15 mL 11    lancets Misc 1 each by Misc.(Non-Drug; Combo Route) route 4 (four) times daily before meals and nightly. 200 each 11    levothyroxine (SYNTHROID) 88 MCG tablet TAKE 1 TABLET (88 MCG TOTAL) BY MOUTH ONCE DAILY. 90 tablet 4    lisinopriL (PRINIVIL,ZESTRIL) 40 MG tablet TAKE 1 TABLET BY MOUTH EVERY DAY 90 tablet 3    metoprolol succinate (TOPROL-XL) 200 MG 24 hr tablet TAKE 1 TABLET (200 MG TOTAL) BY MOUTH ONCE DAILY. 30 tablet 11    multivitamin (THERAGRAN) per tablet Take 1 tablet by mouth.      NIFEdipine (ADALAT CC) 60 MG TbSR TAKE 1 TABLET (60 MG " "TOTAL) BY MOUTH ONCE DAILY. 90 tablet 1    NOVOFINE PLUS 32 gauge x 1/6" Ndle       oxyCODONE (ROXICODONE) 15 MG Tab Take 1 tablet (15 mg total) by mouth every 6 (six) hours as needed for Pain. 120 tablet 0    pantoprazole (PROTONIX) 20 MG tablet Take 1 tablet (20 mg total) by mouth once daily. 30 tablet 0    pen needle, diabetic (BD ULTRA-FINE MENDY PEN NEEDLE) 32 gauge x 5/32" Ndle TEST WITH NOVOLOG THREE TIMES A DAY WITH MEALS AND WITH LEVEMIR AT BEDTIME 100 each 11    rosuvastatin (CRESTOR) 5 MG tablet Take 1 tablet (5 mg total) by mouth once daily. 90 tablet 3    sertraline (ZOLOFT) 100 MG tablet TAKE 1 TABLET (100 MG TOTAL) BY MOUTH ONCE DAILY. 30 tablet 7    tacrolimus (PROGRAF) 1 MG Cap TAKE 2 CAPSULES (2 MG TOTAL) BY MOUTH IN THE MORNING & TAKE 1 CAPSULE (1 MG TOTAL) IN THE EVENING 90 capsule 11    TRUE METRIX GLUCOSE METER Misc TEST 4 (FOUR) TIMES DAILY BEFORE MEALS AND NIGHTLY. 1 each 0    TRUE METRIX GLUCOSE TEST STRIP Strp USE 3 TIMES DAILY TO TEST BLOOD GLUCOSE LEVEL 100 strip 11    ondansetron (ZOFRAN-ODT) 4 MG TbDL Take 1 tablet (4 mg total) by mouth every 8 (eight) hours as needed (n/v). (Patient not taking: Reported on 6/2/2020) 10 tablet 0    sildenafil (VIAGRA) 100 MG tablet Take 1 tablet (100 mg total) by mouth once as needed for Erectile Dysfunction. 30 tablet 11     No current facility-administered medications for this visit.        REVIEW OF SYSTEMS:    GENERAL:  No weight loss, malaise or fevers.  HEENT:  Negative for frequent or significant headaches.  NECK:  Negative for lumps, goiter, pain and significant neck swelling.  RESPIRATORY:  Negative for cough, wheezing or shortness of breath.  CARDIOVASCULAR:  Negative for chest pain, leg swelling or palpitations. H/O hypertension.  GI:  Negative for abdominal discomfort, blood in stools or black stools or change in bowel habits.  MUSCULOSKELETAL:  See HPI.  SKIN:  Negative for lesions, rash, and itching.  PSYCH:  Negative for sleep " disturbance, mood disorder and recent psychosocial stressors.  HEMATOLOGY/LYMPHOLOGY:  Negative for prolonged bleeding, bruising easily or swollen nodes. H/O liver transplant.  NEURO:   No history of headaches, syncope, paralysis, seizures or tremors.  ENDO: Diabetes.   All other reviewed and negative other than HPI.    Past Medical History:  Past Medical History:   Diagnosis Date    Anemia     Anxiety     Chronic pain syndrome 7/13/2011    CKD (chronic kidney disease), stage III     Diabetes mellitus type II, uncontrolled     Discitis of lumbosacral region 1/16/2015    ED (erectile dysfunction)     Encounter for blood transfusion     Genital herpes     Gout, arthritis     History of alcohol abuse     History of hepatitis C, s/p successful Rx w/ SVR24 (cure) - 5/2018 8/23/2011    Completed 24wks Epclusa + RBV w/SVR12 - 2/2018  -     History of positive PPD, treatment status unknown     Pulmonary granulomas, negative sputum cultures for AFB and indeterminate quantferon test    History of substance abuse     Hypertension     Hypothyroidism     Liver replaced by transplant 8/23/2011    DATE: 12/16/2013  LIVER BIOPSY : REASON:  hep C staging  PATHOLOGY COMMITTEE NOTE/PLAN: :grade  1 / stage 1        Pancreatitis 2016    Peptic ulcer disease        Past Surgical History:  Past Surgical History:   Procedure Laterality Date    CHOLECYSTECTOMY      INJECTION OF JOINT Right 12/2/2019    Procedure: INJECTION, JOINT SI;  Surgeon: Thu Penn MD;  Location: [facility] PA[facility];  Service: Pain Management;  Laterality: Right;  RT SI JNT INJ    LIVER TRANSPLANT  06/2010    SPINE SURGERY         Family History:  Family History   Problem Relation Age of Onset    Cancer Mother     Diabetes Mother     Heart disease Mother     Diabetes Sister     Cancer Maternal Uncle 82        colon CA    Melanoma Neg Hx     Psoriasis Neg Hx     Lupus Neg Hx     Eczema Neg Hx     Acne Neg Hx        Social History:  Social  History     Socioeconomic History    Marital status:      Spouse name: Not on file    Number of children: Not on file    Years of education: Not on file    Highest education level: Not on file   Occupational History    Not on file   Social Needs    Financial resource strain: Not on file    Food insecurity:     Worry: Not on file     Inability: Not on file    Transportation needs:     Medical: Not on file     Non-medical: Not on file   Tobacco Use    Smoking status: Former Smoker    Smokeless tobacco: Never Used   Substance and Sexual Activity    Alcohol use: No     Comment: over 5 years ago, none currently    Drug use: No    Sexual activity: Not on file   Lifestyle    Physical activity:     Days per week: Not on file     Minutes per session: Not on file    Stress: Not on file   Relationships    Social connections:     Talks on phone: Not on file     Gets together: Not on file     Attends Shinto service: Not on file     Active member of club or organization: Not on file     Attends meetings of clubs or organizations: Not on file     Relationship status: Not on file   Other Topics Concern    Not on file   Social History Narrative    Not on file       OBJECTIVE:    PHYSICAL EXAMINATION:     General appearance: Well appearing, in no acute distress, alert and oriented x3.  Psych:  Mood and affect appropriate.  Skin: Skin color, texture, turgor normal, no rashes or lesions, in both upper and lower body.  Head/face:  Atraumatic, normocephalic. No palpable lymph nodes.  GI: Abdomen soft. TTP over right upper quadrant along scar.   Back: Straight leg raising in the sitting position is negative to radicular pain bilaterally. There is pain with palpation over lumbar facet joints bilaterally. Limited ROM with pain on flexion and extension. Positive facet loading bilaterally.   Extremities: Peripheral joint ROM is full and pain free without obvious instability or laxity in all four extremities. There  is mild pain with palpation over right SI joint. FABERs is positive on the right, negative on the left. No deformities or skin discoloration. Good capillary refill.   Musculoskeletal:  Right plantar flexion 4/5.  All other LE strength 5/5 and symmetric.  No atrophy or tone abnormalities are noted.  Neuro: Bilateral upper and lower extremity coordination and muscle stretch reflexes are physiologic and symmetric.  Plantar response are downgoing.  Decreased sensation to anterior aspect of right foot.   Gait: Antalgic- ambulates with cane.    ASSESSMENT: 64 y.o. year old male with lower back and right leg pain, consistent with the following diagnoses:     1. Chronic pain syndrome     2. Postlaminectomy syndrome of lumbar region     3. Lumbar radiculopathy     4. Acquired spondylolisthesis     5. Lumbar myelopathy     6. DDD (degenerative disc disease), lumbar     7. S/P lumbar spinal fusion     8. Sacroiliac joint pain     9. Encounter for monitoring opioid maintenance therapy           PLAN:     - Previous imaging was reviewed and discussed with the patient today. Recent labs reviewed with patient today.     - We can repeat SI joint injection as needed. He would like to wait.     - Consider ALCIDES in the future. He would like to wait at this time.     - Patient can continue Roxicodone 15 mg QID PRN pain, #120. Refill provided today. He may call for 2 refills.     - The patient is here today for a refill of current pain medications and they believe these provide effective pain control and improvements in quality of life.  The patient notes no serious side effects, and feels the benefits outweigh the risks.  The patient was reminded of the pain contract that they signed previously as well as the risks and benefits of the medication including possible death.  The updated [place] Board of Pharmacy prescription monitoring program was reviewed, and the patient has been found to be compliant with current treatment plan.  Medication management provided by Dr. Penn.     - Continue Gabapentin 800 mg TID.     - Pain Contract signed 8/14/2019.     - Serum screen from 1/28/2020 was negative for opioids. Will repeat at next visit.     - The patient will continue a home exercise routine to help with pain and strengthening.      - RTC in 3 months.    - Counseled patient regarding the importance of constant sleeping habits and physical therapy.     - Dr. Penn was consulted on the patient and agrees with this plan.    The above plan and management options were discussed at length with patient. Patient is in agreement with the above and verbalized understanding.    Emma Batista NP  06/02/2020

## 2020-07-02 ENCOUNTER — TELEPHONE (OUTPATIENT)
Dept: PAIN MEDICINE | Facility: CLINIC | Age: 65
End: 2020-07-02

## 2020-07-02 DIAGNOSIS — G89.4 CHRONIC PAIN SYNDROME: ICD-10-CM

## 2020-07-02 DIAGNOSIS — M43.10 ACQUIRED SPONDYLOLISTHESIS: ICD-10-CM

## 2020-07-02 DIAGNOSIS — M96.1 POSTLAMINECTOMY SYNDROME OF LUMBAR REGION: ICD-10-CM

## 2020-07-02 DIAGNOSIS — G95.9 LUMBAR MYELOPATHY: ICD-10-CM

## 2020-07-02 DIAGNOSIS — M51.36 DDD (DEGENERATIVE DISC DISEASE), LUMBAR: ICD-10-CM

## 2020-07-02 DIAGNOSIS — M54.16 LUMBAR RADICULOPATHY: ICD-10-CM

## 2020-07-02 DIAGNOSIS — Z98.1 S/P LUMBAR SPINAL FUSION: ICD-10-CM

## 2020-07-02 RX ORDER — OXYCODONE HYDROCHLORIDE 15 MG/1
15 TABLET ORAL EVERY 6 HOURS PRN
Qty: 120 TABLET | Refills: 0 | Status: SHIPPED | OUTPATIENT
Start: 2020-07-02 | End: 2020-07-22 | Stop reason: SDUPTHER

## 2020-07-02 NOTE — TELEPHONE ENCOUNTER
----- Message from Marielena Fofana sent at 7/2/2020 11:12 AM CDT -----  Regarding: Refill Request  Who Called: VIRGIL GR [[id number]]    RX Name and Strength:oxyCODONE (ROXICODONE) 15 MG Tab    Preferred Pharmacy with phone number:[facility] ([place] - [address]    Best Call Back Number: [phone]    Additional Information: He states he would like a call once the script is filled

## 2020-07-02 NOTE — TELEPHONE ENCOUNTER
Staff returned Pharmacist BJ in regards to if approval can be advise for patient to take Elavil and Roxicodone.    Staff informed pharmacist -per LETI Cuadra that it will be okay for him to approve both medications.

## 2020-07-02 NOTE — TELEPHONE ENCOUNTER
----- Message from Marielena Mendes sent at 7/2/2020  3:41 PM CDT -----  Name of Who is Calling: KEVIN([facility])      What is the request in detail: Pt  pharm. is calling to speak to staff in regards to an approval of a pt to take two specific meds together .... Please call to further assist .       Can the clinic reply by [facility]: N      What Number to Call Back if not in [facility]: [phone]

## 2020-07-02 NOTE — TELEPHONE ENCOUNTER
Patient requesting refill on 07/02/20.  Last office visit 6/2/20   shows last refill on 6/2/20  Patient does have a pain contract on file with [facility]  Patient last UDS 1/28/20 was consistent with current therapy.    Please review and advise.

## 2020-07-02 NOTE — TELEPHONE ENCOUNTER
Staff contacted the patient to inform him his request for a refill has been approved and e prescribed to his pharmacy   [facility] ([place]) - [place], [place] - [address]        Please review and advise.

## 2020-07-17 DIAGNOSIS — Z71.89 COMPLEX CARE COORDINATION: ICD-10-CM

## 2020-07-22 DIAGNOSIS — M54.16 LUMBAR RADICULOPATHY: ICD-10-CM

## 2020-07-22 DIAGNOSIS — M43.10 ACQUIRED SPONDYLOLISTHESIS: ICD-10-CM

## 2020-07-22 DIAGNOSIS — G95.9 LUMBAR MYELOPATHY: ICD-10-CM

## 2020-07-22 DIAGNOSIS — G89.4 CHRONIC PAIN SYNDROME: ICD-10-CM

## 2020-07-22 DIAGNOSIS — Z98.1 S/P LUMBAR SPINAL FUSION: ICD-10-CM

## 2020-07-22 DIAGNOSIS — M96.1 POSTLAMINECTOMY SYNDROME OF LUMBAR REGION: ICD-10-CM

## 2020-07-22 DIAGNOSIS — M51.36 DDD (DEGENERATIVE DISC DISEASE), LUMBAR: ICD-10-CM

## 2020-07-22 RX ORDER — OXYCODONE HYDROCHLORIDE 15 MG/1
15 TABLET ORAL EVERY 6 HOURS PRN
Qty: 120 TABLET | Refills: 0 | Status: SHIPPED | OUTPATIENT
Start: 2020-08-01 | End: 2020-08-31

## 2020-07-22 NOTE — TELEPHONE ENCOUNTER
Patient requesting refill on 7/22/20  Last office visit 6/2/20   shows last refill on 7/2/20  Patient does have a pain contract on file with [facility]  Patient last UDS:Serum screen from 1/28/2020 was negative for opioids. Will repeat at next visit.          Please review and advise.

## 2020-07-22 NOTE — TELEPHONE ENCOUNTER
----- Message from Sudhakar Templeton sent at 7/22/2020  3:16 PM CDT -----      Can the clinic reply in [facility]:N        Please refill the medication(s) listed below. Please call the patient when the prescription(s) is ready for  at this phone number         Medication #1 oxyCODONE (ROXICODONE) 15 MG Tab    Medication #2       Preferred Pharmacy: [facility] ([place]) - [address]

## 2020-07-22 NOTE — TELEPHONE ENCOUNTER
----- Message from Sudhakar Templeton sent at 7/22/2020  3:17 PM CDT -----      Name of Who is Calling: VIRGIL GR [[id number]]      What is the request in detail: Pt called to ask if he get a refill for August.Please contact to further discuss and advise.          Can the clinic reply by [facility]: N      What Number to [facility] Back [facility]: [phone]

## 2020-07-27 ENCOUNTER — LAB VISIT (OUTPATIENT)
Dept: LAB | Facility: HOSPITAL | Age: 65
End: 2020-07-27
Attending: INTERNAL MEDICINE
Payer: MEDICARE

## 2020-07-27 DIAGNOSIS — Z94.4 LIVER TRANSPLANTED: ICD-10-CM

## 2020-07-27 DIAGNOSIS — K74.00 HEPATIC FIBROSIS: ICD-10-CM

## 2020-07-27 LAB
AFP SERPL-MCNC: 5.2 NG/ML (ref 0–8.4)
ALBUMIN SERPL BCP-MCNC: 3.6 G/DL (ref 3.5–5.2)
ALP SERPL-CCNC: 91 U/L (ref 55–135)
ALT SERPL W/O P-5'-P-CCNC: 25 U/L (ref 10–44)
ANION GAP SERPL CALC-SCNC: 10 MMOL/L (ref 8–16)
AST SERPL-CCNC: 16 U/L (ref 10–40)
BASOPHILS # BLD AUTO: 0.04 K/UL (ref 0–0.2)
BASOPHILS NFR BLD: 0.5 % (ref 0–1.9)
BILIRUB SERPL-MCNC: 0.4 MG/DL (ref 0.1–1)
BUN SERPL-MCNC: 15 MG/DL (ref 8–23)
CALCIUM SERPL-MCNC: 9.1 MG/DL (ref 8.7–10.5)
CHLORIDE SERPL-SCNC: 102 MMOL/L (ref 95–110)
CO2 SERPL-SCNC: 25 MMOL/L (ref 23–29)
CREAT SERPL-MCNC: 1.6 MG/DL (ref 0.5–1.4)
DIFFERENTIAL METHOD: ABNORMAL
EOSINOPHIL # BLD AUTO: 0.5 K/UL (ref 0–0.5)
EOSINOPHIL NFR BLD: 6.1 % (ref 0–8)
ERYTHROCYTE [DISTWIDTH] IN BLOOD BY AUTOMATED COUNT: 13.1 % (ref 11.5–14.5)
EST. GFR  (AFRICAN AMERICAN): 52.6 ML/MIN/1.73 M^2
EST. GFR  (NON AFRICAN AMERICAN): 45.5 ML/MIN/1.73 M^2
GLUCOSE SERPL-MCNC: 356 MG/DL (ref 70–110)
HCT VFR BLD AUTO: 46.5 % (ref 40–54)
HGB BLD-MCNC: 14.6 G/DL (ref 14–18)
IMM GRANULOCYTES # BLD AUTO: 0.02 K/UL (ref 0–0.04)
IMM GRANULOCYTES NFR BLD AUTO: 0.2 % (ref 0–0.5)
LYMPHOCYTES # BLD AUTO: 3.3 K/UL (ref 1–4.8)
LYMPHOCYTES NFR BLD: 41.2 % (ref 18–48)
MCH RBC QN AUTO: 27.8 PG (ref 27–31)
MCHC RBC AUTO-ENTMCNC: 31.4 G/DL (ref 32–36)
MCV RBC AUTO: 89 FL (ref 82–98)
MONOCYTES # BLD AUTO: 0.5 K/UL (ref 0.3–1)
MONOCYTES NFR BLD: 6.7 % (ref 4–15)
NEUTROPHILS # BLD AUTO: 3.6 K/UL (ref 1.8–7.7)
NEUTROPHILS NFR BLD: 45.3 % (ref 38–73)
NRBC BLD-RTO: 0 /100 WBC
PLATELET # BLD AUTO: 159 K/UL (ref 150–350)
PMV BLD AUTO: 12.8 FL (ref 9.2–12.9)
POTASSIUM SERPL-SCNC: 3.9 MMOL/L (ref 3.5–5.1)
PROT SERPL-MCNC: 7.7 G/DL (ref 6–8.4)
RBC # BLD AUTO: 5.25 M/UL (ref 4.6–6.2)
SODIUM SERPL-SCNC: 137 MMOL/L (ref 136–145)
WBC # BLD AUTO: 8.03 K/UL (ref 3.9–12.7)

## 2020-07-27 PROCEDURE — 80197 ASSAY OF TACROLIMUS: CPT

## 2020-07-27 PROCEDURE — 85025 COMPLETE CBC W/AUTO DIFF WBC: CPT

## 2020-07-27 PROCEDURE — 82105 ALPHA-FETOPROTEIN SERUM: CPT

## 2020-07-27 PROCEDURE — 36415 COLL VENOUS BLD VENIPUNCTURE: CPT | Mod: PO

## 2020-07-27 PROCEDURE — 80053 COMPREHEN METABOLIC PANEL: CPT

## 2020-07-28 LAB — TACROLIMUS BLD-MCNC: 3.9 NG/ML (ref 5–15)

## 2020-07-30 ENCOUNTER — TELEPHONE (OUTPATIENT)
Dept: TRANSPLANT | Facility: CLINIC | Age: 65
End: 2020-07-30

## 2020-07-30 DIAGNOSIS — Z94.4 LIVER REPLACED BY TRANSPLANT: Primary | ICD-10-CM

## 2020-07-30 DIAGNOSIS — K74.00 HEPATIC FIBROSIS: ICD-10-CM

## 2020-07-30 NOTE — TELEPHONE ENCOUNTER
Letter sent, labs stable and no medication changes are needed. Repeat labs due 10/26/20 per protocol. Also reminded pt to call to schedule his yearly follow-up with Dr. Coleman.    ----- Message from James Coleman MD sent at 7/29/2020  5:06 PM CDT -----  Results reviewed

## 2020-07-30 NOTE — LETTER
July 30, 2020    Vick Gonzalez  [address] [facility] [address] [facility] [address]          Dear Vick Gonzalez:  MRN: [id number]    This is a follow up to your recent labs, your lab results were stable.  There are no medicine changes.  Please have your labs drawn again on 10/26/20.      You are due for a yearly follow-up with Dr. Coleman. Please call our office to schedule.    If you cannot have your labs drawn on the scheduled date, it is your responsibility to call the transplant department to reschedule.  Please call [phone] and ask to speak to [facility] -  for all scheduling requests.     Sincerely,    Kandy Herrera, RN, BSN, CCTC  Sr Liver Transplant Coordinator  [facility]  [address] [place] [id number] [phone]

## 2020-08-21 ENCOUNTER — OFFICE VISIT (OUTPATIENT)
Dept: TRANSPLANT | Facility: CLINIC | Age: 65
End: 2020-08-21
Payer: MEDICARE

## 2020-08-21 VITALS
OXYGEN SATURATION: 98 % | SYSTOLIC BLOOD PRESSURE: 157 MMHG | BODY MASS INDEX: 36.02 KG/M2 | TEMPERATURE: 98 F | HEART RATE: 89 BPM | DIASTOLIC BLOOD PRESSURE: 92 MMHG | WEIGHT: 271.81 LBS | RESPIRATION RATE: 18 BRPM | HEIGHT: 73 IN

## 2020-08-21 DIAGNOSIS — Z86.19 HISTORY OF HEPATITIS C: ICD-10-CM

## 2020-08-21 DIAGNOSIS — Z94.4 S/P LIVER TRANSPLANT: Primary | ICD-10-CM

## 2020-08-21 PROCEDURE — 99999 PR PBB SHADOW E&M-EST. PATIENT-LVL V: ICD-10-PCS | Mod: PBBFAC,,, | Performed by: INTERNAL MEDICINE

## 2020-08-21 PROCEDURE — 99214 PR OFFICE/OUTPT VISIT, EST, LEVL IV, 30-39 MIN: ICD-10-PCS | Mod: S$PBB,,, | Performed by: INTERNAL MEDICINE

## 2020-08-21 PROCEDURE — 99215 OFFICE O/P EST HI 40 MIN: CPT | Mod: PBBFAC | Performed by: INTERNAL MEDICINE

## 2020-08-21 PROCEDURE — 99999 PR PBB SHADOW E&M-EST. PATIENT-LVL V: CPT | Mod: PBBFAC,,, | Performed by: INTERNAL MEDICINE

## 2020-08-21 PROCEDURE — 99214 OFFICE O/P EST MOD 30 MIN: CPT | Mod: S$PBB,,, | Performed by: INTERNAL MEDICINE

## 2020-08-21 NOTE — PROGRESS NOTES
Subjective:       Patient ID: Vick Gonzalez is a 64 y.o. male.    Chief Complaint: Liver Transplant Follow-up    HPI  I saw Vick Gonzalez today in the [facility]. This 64-year-old  gentleman is now 10 years post liver transplant for end-stage liver disease due to hepatitis C. He has been doing well post transplant. He feels well with no symptoms of advanced chronic liver   Disease.    His last liver bx in Dec 2013 showed stage 1 fibrosis only.  Fibrosure showed F4 but his fibroscan in 2019 showed F2.    He is maintained on tacrolimus 2/1 mg daily..    Treated for HCV- ledip/sof and ribavirin but relapsed.  SVR with Epclusa + riba for  24 weeks.    LFTs- normal  Creatinine 1.6    BP managed by PCP  Body mass index is 35.86 kg/m².    Abdo US: 3/3/20  1. Diffusely increased parenchymal echogenicity consistent with hepatic steatosis, similar to prior studies.  2. Mildly increased velocity within the main hepatic artery, likely related to vessel tortuosity.      Lab Results   Component Value Date    ALT 25 07/27/2020    AST 16 07/27/2020     (H) 04/24/2017    ALKPHOS 91 07/27/2020    BILITOT 0.4 07/27/2020       Review of Systems   Constitutional: Negative for activity change, appetite change, fatigue, fever and unexpected weight change.   HENT: Negative.  Negative for ear pain, hearing loss, sinus pressure and tinnitus.    Eyes: Negative.  Negative for photophobia, discharge, itching and visual disturbance.   Respiratory: Negative.  Negative for cough, chest tightness and shortness of breath.    Cardiovascular: Negative for chest pain, palpitations and leg swelling.   Gastrointestinal: Negative for constipation, diarrhea, nausea and vomiting.   Genitourinary: Negative for decreased urine volume, difficulty urinating, dysuria, frequency (nocturia x 2) and urgency.   Musculoskeletal: Negative.  Negative for arthralgias, back pain, gait problem, joint swelling and myalgias.    Skin: Negative.    Neurological: Negative for dizziness, seizures, numbness and headaches.   Hematological: Negative for adenopathy. Does not bruise/bleed easily.   Psychiatric/Behavioral: Negative for dysphoric mood and sleep disturbance. The patient is not nervous/anxious.          Objective:      Physical Exam   Constitutional: He is oriented to person, place, and time. He appears well-developed and well-nourished.   HENT:   Head: Normocephalic and atraumatic.   Eyes: Pupils are equal, round, and reactive to light. Conjunctivae are normal.   Neck: Normal range of motion. No thyromegaly present.   Cardiovascular: Normal rate, regular rhythm and normal heart sounds.   No murmur heard.  Pulmonary/Chest: Effort normal and breath sounds normal. He has no wheezes.   Abdominal: Soft. Bowel sounds are normal. He exhibits no distension and no mass. There is no abdominal tenderness.   Musculoskeletal:         General: No edema.   Lymphadenopathy:     He has no cervical adenopathy.   Neurological: He is alert and oriented to person, place, and time.   Skin: Skin is warm. No rash noted. No erythema.   Psychiatric: He has a normal mood and affect. His behavior is normal.       Assessment:       1. S/P liver transplant    2. History of hepatitis C, s/p successful Rx w/ SVR24 (cure) - 5/2018        Plan:   No immunosuppressive changes- continue tac 2/1 mg  Stage 2 fibrosis on fibroscan last year.    - reminded of the need to lose weight and given advice to cut down on carbs  - improve DM control  - oral rehydration  - labs every 3 months  - US every 6 months on the assumption that he may have significant liver fibrosis.    Clinic in 1 year.

## 2020-08-21 NOTE — LETTER
August 21, 2020        Emanuel Lopez  [address]  [address]  [place] [address]  Phone: [phone]  Fax: [phone]             [facility]  [address]  [facility] [address]  Phone: [phone]   Patient: Vick Gonzalez   MR Number: [id number]   YOB: 1955   Date of Visit: 8/21/2020       Dear Dr. Emanuel Lopez    Thank you for referring Vick Gonzalez to me for evaluation. Attached you will find relevant portions of my assessment and plan of care.    If you have questions, please do not hesitate to call me. I look forward to following Vick Gonzalez along with you.    Sincerely,    James Coleman MD    Enclosure    If you would like to receive this communication electronically, please contact [email or web address] or [phone] to request [employer] access.    [employer] is a tool which provides read-only access to select patient information with whom you have a relationship. Its easy to use and provides real time access to review your patients record including encounter summaries, notes, results, and demographic information.    If you feel you have received this communication in error or would no longer like to receive these types of communications, please e-mail [email or web address]

## 2020-08-31 ENCOUNTER — OFFICE VISIT (OUTPATIENT)
Dept: FAMILY MEDICINE | Facility: CLINIC | Age: 65
End: 2020-08-31
Payer: MEDICARE

## 2020-08-31 VITALS
HEIGHT: 73 IN | SYSTOLIC BLOOD PRESSURE: 166 MMHG | HEART RATE: 82 BPM | OXYGEN SATURATION: 98 % | WEIGHT: 271.81 LBS | TEMPERATURE: 98 F | DIASTOLIC BLOOD PRESSURE: 92 MMHG | BODY MASS INDEX: 36.02 KG/M2

## 2020-08-31 DIAGNOSIS — E66.01 SEVERE OBESITY (BMI 35.0-35.9 WITH COMORBIDITY): ICD-10-CM

## 2020-08-31 DIAGNOSIS — Z94.4 LIVER TRANSPLANT RECIPIENT: ICD-10-CM

## 2020-08-31 DIAGNOSIS — E03.9 HYPOTHYROIDISM, UNSPECIFIED TYPE: ICD-10-CM

## 2020-08-31 DIAGNOSIS — Z86.19 HISTORY OF HEPATITIS C: ICD-10-CM

## 2020-08-31 DIAGNOSIS — D84.9 IMMUNOSUPPRESSION: ICD-10-CM

## 2020-08-31 DIAGNOSIS — E11.65 UNCONTROLLED TYPE 2 DIABETES MELLITUS WITH HYPERGLYCEMIA: Primary | ICD-10-CM

## 2020-08-31 DIAGNOSIS — N52.9 ERECTILE DYSFUNCTION, UNSPECIFIED ERECTILE DYSFUNCTION TYPE: ICD-10-CM

## 2020-08-31 DIAGNOSIS — I73.9 PAD (PERIPHERAL ARTERY DISEASE): ICD-10-CM

## 2020-08-31 DIAGNOSIS — G89.4 CHRONIC PAIN SYNDROME: ICD-10-CM

## 2020-08-31 DIAGNOSIS — F41.9 ANXIETY: ICD-10-CM

## 2020-08-31 DIAGNOSIS — G47.00 INSOMNIA, UNSPECIFIED TYPE: ICD-10-CM

## 2020-08-31 DIAGNOSIS — E78.1 HYPERTRIGLYCERIDEMIA: ICD-10-CM

## 2020-08-31 DIAGNOSIS — N18.30 CKD (CHRONIC KIDNEY DISEASE), STAGE III: ICD-10-CM

## 2020-08-31 DIAGNOSIS — I10 HYPERTENSION, UNSPECIFIED TYPE: ICD-10-CM

## 2020-08-31 PROCEDURE — 99215 OFFICE O/P EST HI 40 MIN: CPT | Mod: PBBFAC,PO | Performed by: FAMILY MEDICINE

## 2020-08-31 PROCEDURE — 99999 PR PBB SHADOW E&M-EST. PATIENT-LVL V: CPT | Mod: PBBFAC,,, | Performed by: FAMILY MEDICINE

## 2020-08-31 PROCEDURE — 99215 PR OFFICE/OUTPT VISIT, EST, LEVL V, 40-54 MIN: ICD-10-PCS | Mod: S$PBB,,, | Performed by: FAMILY MEDICINE

## 2020-08-31 PROCEDURE — 99999 PR PBB SHADOW E&M-EST. PATIENT-LVL V: ICD-10-PCS | Mod: PBBFAC,,, | Performed by: FAMILY MEDICINE

## 2020-08-31 PROCEDURE — 99215 OFFICE O/P EST HI 40 MIN: CPT | Mod: S$PBB,,, | Performed by: FAMILY MEDICINE

## 2020-08-31 RX ORDER — TRAZODONE HYDROCHLORIDE 50 MG/1
50 TABLET ORAL NIGHTLY
Qty: 30 TABLET | Refills: 11 | Status: SHIPPED | OUTPATIENT
Start: 2020-08-31 | End: 2021-10-11

## 2020-08-31 RX ORDER — TADALAFIL 10 MG/1
10 TABLET ORAL DAILY PRN
Qty: 30 TABLET | Refills: 11 | Status: SHIPPED | OUTPATIENT
Start: 2020-08-31 | End: 2020-09-10 | Stop reason: ALTCHOICE

## 2020-08-31 RX ORDER — LEVOTHYROXINE SODIUM 88 UG/1
88 TABLET ORAL DAILY
Qty: 90 TABLET | Refills: 4 | Status: SHIPPED | OUTPATIENT
Start: 2020-08-31 | End: 2021-09-20

## 2020-08-31 NOTE — PROGRESS NOTES
Chief Complaint  Chief Complaint   Patient presents with    Follow-up       HPI  Vick Gonzalez is a 64 y.o. male with multiple medical diagnoses as listed in the medical history and problem list that presents for 3mo check up.  Their last appointment with primary care was 5/4/2020.      Diabetes:  On Levemir 70 units daily, NovoLog 20 units with meals.  Trulicity currently at 1.5 mg weekly.  Morning sugar was 175 today. He is still eating cakes and cookies daily, with orange juice. He is no longer drinking Monster energy drinks.  A1c was improving to 7.4 from 9 range with addition of Trulicity but most recent check in January shows elevation as below to 8.7.  Admits sometimes over eating.  Exercises by walking.  Does not skip meals.  States that he typically eats a lot of unhealthy foods, regular fried chicken, ate 6  boiled pigs feet this morning,  Thinks that is why his blood pressure might be high today.     Eye exam:  Optometry visit 10/29/2019, will schedule for this October.    Neuropathy: endorses bilateral foot symptoms, likely due to his diet.    Hypertension on lisinopril 40 mg daily, metoprolol 200 mg daily, nifedipine 60 mg daily. Says his BP is 'good' at home, but recheck was 162/90.     Crestor 5mg daily.     Hypothyroidism: He is taking his Synthroid 88mcg.  No fatigue, palpitations, dizziness, skin changes.  Last prescription however was from 2018, it sounds like he was not taking this for a while and just got back on it     Anxiety on Zoloft 100 mg daily. Still feeling anxious daily.      Amitriptyline 50 mg at bedtime to help with sleep. It is not working well,he tried taking 2 pills and still wasn't able to sleep. He took one of his daughter's Seroquel which put him to sleep.     Liver Transplant secondary to end-stage liver disease hepatitis C and prior alcohol abuse.:  Followed by hepatology-saw last week.  On Prograf.   fibroscan showed stage II fibrosis     Chronic pain, followed by pain  management.  On oxycodone, gabapentin.   Reviewed last pain management note on  June 2020.  Consideration of SI joint injection as needed (12/02/2019 performed), ALCIDES  advised.  Advised on continuing current meds and physical exercise (tries to walk with a cane).  No changes to therapy recommended.  UDS on file through pain management     CKD stage 3:  Reviewd at last visit, stable. Hepatology CMP Cr 1.6 7/26.     ED, has been treated with sildenafil. It is not working for him, would like to see Urology at main [place].    PAST MEDICAL HISTORY:  Past Medical History:   Diagnosis Date    Anemia     Anxiety     Chronic pain syndrome 7/13/2011    CKD (chronic kidney disease), stage III     Diabetes mellitus type II, uncontrolled     Discitis of lumbosacral region 1/16/2015    ED (erectile dysfunction)     Encounter for blood transfusion     Genital herpes     Gout, arthritis     History of alcohol abuse     History of hepatitis C, s/p successful Rx w/ SVR24 (cure) - 5/2018 8/23/2011    Completed 24wks Epclusa + RBV w/SVR12 - 2/2018  -     History of positive PPD, treatment status unknown     Pulmonary granulomas, negative sputum cultures for AFB and indeterminate quantferon test    History of substance abuse     Hypertension     Hypothyroidism     Liver replaced by transplant 8/23/2011    DATE: 12/16/2013  LIVER BIOPSY : REASON:  hep C staging  PATHOLOGY COMMITTEE NOTE/PLAN: :grade  1 / stage 1        Pancreatitis 2016    Peptic ulcer disease        PAST SURGICAL HISTORY:  Past Surgical History:   Procedure Laterality Date    CHOLECYSTECTOMY      INJECTION OF JOINT Right 12/2/2019    Procedure: INJECTION, JOINT SI;  Surgeon: Thu Penn MD;  Location: [facility];  Service: Pain Management;  Laterality: Right;  RT SI JNT INJ    LIVER TRANSPLANT  06/2010    SPINE SURGERY         SOCIAL HISTORY:  Social History     Socioeconomic History    Marital status:      Spouse name: Not on file     "Number of children: Not on file    Years of education: Not on file    Highest education level: Not on file   Occupational History    Not on file   Social Needs    Financial resource strain: Not on file    Food insecurity     Worry: Not on file     Inability: Not on file    Transportation needs     Medical: Not on file     Non-medical: Not on file   Tobacco Use    Smoking status: Former Smoker    Smokeless tobacco: Never Used   Substance and Sexual Activity    Alcohol use: No     Comment: over 5 years ago, none currently    Drug use: No    Sexual activity: Not on file   Lifestyle    Physical activity     Days per week: Not on file     Minutes per session: Not on file    Stress: Not on file   Relationships    Social connections     Talks on phone: Not on file     Gets together: Not on file     Attends Latter day service: Not on file     Active member of club or organization: Not on file     Attends meetings of clubs or organizations: Not on file     Relationship status: Not on file   Other Topics Concern    Not on file   Social History Narrative    Not on file       FAMILY HISTORY:  Family History   Problem Relation Age of Onset    Cancer Mother     Diabetes Mother     Heart disease Mother     Diabetes Sister     Cancer Maternal Uncle 82        colon CA    Melanoma Neg Hx     Psoriasis Neg Hx     Lupus Neg Hx     Eczema Neg Hx     Acne Neg Hx        ALLERGIES AND MEDICATIONS: updated and reviewed.  Review of patient's allergies indicates:  No Known Allergies  Current Outpatient Medications   Medication Sig Dispense Refill    allopurinoL (ZYLOPRIM) 100 MG tablet TAKE 1 TABLET BY MOUTH TWICE A DAY 60 tablet 7    BD ULTRA-FINE MENDY PEN NEEDLES 32 gauge x 5/32" Ndle       calcium carbonate-vitamin D3 (CALTRATE 600 + D) 600 mg(1,500mg) -400 unit Chew       colchicine 0.6 mg tablet Take 1 tablet (0.6 mg total) by mouth 2 (two) times daily. 30 tablet 2    dulaglutide (TRULICITY) 1.5 mg/0.5 mL " "pen injector Inject 1.5 mg (1 syringe) into the skin every 7 days. 2 mL 11    gabapentin (NEURONTIN) 800 MG tablet Take 1 tablet (800 mg total) by mouth 3 (three) times daily. 90 tablet 11    insulin aspart U-100 (NOVOLOG FLEXPEN U-100 INSULIN) 100 unit/mL (3 mL) InPn pen Inject 20 Units into the skin 3 (three) times daily with meals. 15 mL 11    insulin detemir U-100 (LEVEMIR FLEXTOUCH) 100 unit/mL (3 mL) SubQ InPn pen Inject 70 Units into the skin every evening. 15 mL 11    lancets Misc 1 each by Misc.(Non-Drug; Combo Route) route 4 (four) times daily before meals and nightly. 200 each 11    levothyroxine (SYNTHROID) 88 MCG tablet Take 1 tablet (88 mcg total) by mouth once daily. 90 tablet 4    lisinopriL (PRINIVIL,ZESTRIL) 40 MG tablet TAKE 1 TABLET BY MOUTH EVERY DAY 90 tablet 3    metoprolol succinate (TOPROL-XL) 200 MG 24 hr tablet TAKE 1 TABLET (200 MG TOTAL) BY MOUTH ONCE DAILY. 30 tablet 11    multivitamin (THERAGRAN) per tablet Take 1 tablet by mouth.      NIFEdipine (ADALAT CC) 60 MG TbSR TAKE 1 TABLET (60 MG TOTAL) BY MOUTH ONCE DAILY. 90 tablet 1    NOVOFINE PLUS 32 gauge x 1/6" Ndle       ondansetron (ZOFRAN-ODT) 4 MG TbDL Take 1 tablet (4 mg total) by mouth every 8 (eight) hours as needed (n/v). 10 tablet 0    oxyCODONE (ROXICODONE) 15 MG Tab Take 1 tablet (15 mg total) by mouth every 6 (six) hours as needed for Pain. 120 tablet 0    pantoprazole (PROTONIX) 20 MG tablet Take 1 tablet (20 mg total) by mouth once daily. 30 tablet 0    pen needle, diabetic (BD ULTRA-FINE MENDY PEN NEEDLE) 32 gauge x 5/32" Ndle TEST WITH NOVOLOG THREE TIMES A DAY WITH MEALS AND WITH LEVEMIR AT BEDTIME 100 each 11    rosuvastatin (CRESTOR) 5 MG tablet Take 1 tablet (5 mg total) by mouth once daily. 90 tablet 3    sertraline (ZOLOFT) 100 MG tablet TAKE 1 TABLET (100 MG TOTAL) BY MOUTH ONCE DAILY. 30 tablet 7    tacrolimus (PROGRAF) 1 MG Cap TAKE 2 CAPSULES (2 MG TOTAL) BY MOUTH IN THE MORNING & TAKE 1 " CAPSULE (1 MG TOTAL) IN THE EVENING 90 capsule 11    TRUE METRIX GLUCOSE METER Misc TEST 4 (FOUR) TIMES DAILY BEFORE MEALS AND NIGHTLY. 1 each 0    TRUE METRIX GLUCOSE TEST STRIP Strp USE 3 TIMES DAILY TO TEST BLOOD GLUCOSE LEVEL 100 strip 11    tadalafiL (CIALIS) 10 MG tablet Take 1 tablet (10 mg total) by mouth daily as needed. 30 tablet 11    traZODone (DESYREL) 50 MG tablet Take 1 tablet (50 mg total) by mouth every evening. 30 tablet 11     No current facility-administered medications for this visit.             Lab Results   Component Value Date     WBC 8.09 05/04/2020     HGB 14.2 05/04/2020     HCT 44.4 05/04/2020     MCV 88 05/04/2020      05/04/2020     CHOL 163 10/04/2019     TRIG 403 (H) 10/04/2019     HDL 30 (L) 10/04/2019     ALT 24 05/04/2020     AST 16 05/04/2020     BILITOT 0.3 05/04/2020     ALKPHOS 152 (H) 05/04/2020      (L) 05/04/2020     K 3.8 05/04/2020     CL 98 05/04/2020     CREATININE 1.1 05/04/2020     ESTGFRAFRICA >60.0 05/04/2020     EGFRNONAA >60.0 05/04/2020     CALCIUM 9.2 05/04/2020     ALBUMIN 3.5 05/04/2020     BUN 12 05/04/2020     CO2 24 05/04/2020     TSH 1.924 07/25/2018     PSA 0.30 09/06/2016     PSADIAG 0.28 10/09/2017     INR 1.0 07/10/2019     HGBA1C 8.7 (H) 01/23/2020     MICALBCREAT 186.1 (H) 03/27/2017     LDLCALC Invalid, Trig>400.0 10/04/2019      (H) 05/04/2020     ROS  Review of Systems   Constitutional: Negative for chills and fever.   HENT: Negative for congestion, ear pain, postnasal drip, rhinorrhea, sore throat and trouble swallowing.    Eyes: Negative for redness and itching.   Respiratory: Negative for cough, shortness of breath and wheezing.    Cardiovascular: Negative for chest pain and palpitations.   Gastrointestinal: Negative for abdominal pain, diarrhea, nausea and vomiting.   Genitourinary: Negative for difficulty urinating, dysuria and frequency.   Skin: Negative for rash.   Neurological: Negative for weakness and headaches.  "      Physical Exam  Vitals:    08/31/20 0855   BP: (!) 166/92   Pulse: 82   Temp: 98.3 °F (36.8 °C)   TempSrc: Oral   SpO2: 98%   Weight: 123.3 kg (271 lb 13.2 oz)   Height: 6' 1" (1.854 m)    Body mass index is 35.86 kg/m².  Weight: 123.3 kg (271 lb 13.2 oz)   Height: 6' 1" (185.4 cm)   Physical Exam  Vitals signs reviewed.   Constitutional:       Appearance: Normal appearance. He is obese.   HENT:      Mouth/Throat:      Mouth: Mucous membranes are moist.      Pharynx: Oropharynx is clear.   Eyes:      Extraocular Movements: Extraocular movements intact.      Pupils: Pupils are equal, round, and reactive to light.   Neck:      Musculoskeletal: Normal range of motion.   Cardiovascular:      Rate and Rhythm: Normal rate and regular rhythm.      Heart sounds: No murmur. No friction rub. No gallop.    Pulmonary:      Effort: Pulmonary effort is normal.      Breath sounds: Normal breath sounds. No wheezing, rhonchi or rales.   Abdominal:      General: Bowel sounds are normal.      Palpations: Abdomen is soft.   Musculoskeletal: Normal range of motion.      Right lower leg: No edema.      Left lower leg: No edema.   Skin:     General: Skin is warm.      Capillary Refill: Capillary refill takes less than 2 seconds.   Neurological:      Mental Status: He is alert and oriented to person, place, and time.   Psychiatric:         Mood and Affect: Mood normal.       Health Maintenance       Date Due Completion Date    Complete Opioid Risk Tool 12/14/1975 ---    Naloxone Prescription 12/14/1975 ---    Shingles Vaccine (1 of 2) 12/14/2007 ---    Urine Drug Screen 02/22/2019 8/22/2018    Hemoglobin A1c 04/23/2020 1/23/2020    Foot Exam 10/08/2020 10/8/2019    Influenza Vaccine (1) 09/01/2020 10/8/2019    Lipid Panel 10/04/2020 10/4/2019    Eye Exam 10/29/2020 10/29/2019    Colorectal Cancer Screening 07/10/2025 7/10/2015    TETANUS VACCINE 10/29/2025 10/29/2015 (Done)    Override on 10/29/2015: Done        Assessment and " Plan:  1. Uncontrolled type 2 diabetes mellitus with hyperglycemia    2. Erectile dysfunction, unspecified erectile dysfunction type    3. CKD (chronic kidney disease), stage III    4. Hypertension, unspecified type    5. Immunosuppression    6. Liver transplant recipient    7. Hypothyroidism, unspecified type    8. Hypertriglyceridemia    9. Chronic pain syndrome    10. History of hepatitis C, s/p successful Rx w/ SVR24 (cure) - 5/2018    11. PAD (peripheral artery disease)    12. Severe obesity (BMI 35.0-35.9 with comorbidity)    13. Insomnia, unspecified type    14. Anxiety        Uncontrolled type 2 diabetes mellitus with hyperglycemia   increase Levemir to 80 units nightly.  Continue NovoLog 20 t.i.d. plus Trulicity 1.5 mg daily.  Emphasize the importance of lifestyle modification and weight loss.  Chart sugars.  Goal fasting sugar less than 140 but above 70.  Goal postprandial sugar less than 180       Erectile dysfunction, unspecified erectile dysfunction type  -     Ambulatory referral/consult to Urology; Future; Expected date: 09/07/2020  Trial of Cialis in place of Viagra   emphasis on the importance of his chronic condition control like diabetes and hypertension to help with ED    CKD (chronic kidney disease), stage III  , improve  diabetes and blood pressure control     Hypertension, unspecified type  Increase nifedipine to  90 mg daily.  Continue lisinopril at 40 mg daily.  Emphasize the importance of healthy diet, exercise, sodium reduction, weight loss     Immunosuppression  Continue follow-up with hepatology     Liver transplant recipient  Continue follow-up with hepatology     Hypothyroidism, unspecified type  Refilled Synthroid 88. Check labs from May that were not done     Hypertriglyceridemia  Continue Crestor, advised diabetes control, healthy diet, exercise, weight loss    Chronic pain syndrome  Continue pain management follow-up    History of hepatitis C, s/p successful Rx w/ SVR24 (cure) -  5/2018    PAD (peripheral artery disease)   continue secondary prevention efforts     Severe obesity (BMI 35.0-35.9 with comorbidity)  Lifestyle management discussed in depth    Insomnia, unspecified type   add trazodone 50 mg nightly.  Stop amitriptyline 50 mg nightly     Anxiety  Zoloft 100 mg daily to be continued.   Add trazodone      Other orders  -     traZODone (DESYREL) 50 MG tablet; Take 1 tablet (50 mg total) by mouth every evening.  Dispense: 30 tablet; Refill: 11  -     levothyroxine (SYNTHROID) 88 MCG tablet; Take 1 tablet (88 mcg total) by mouth once daily.  Dispense: 90 tablet; Refill: 4  -     tadalafiL (CIALIS) 10 MG tablet; Take 1 tablet (10 mg total) by mouth daily as needed.  Dispense: 30 tablet; Refill: 11       follow-up visit in 1 month for blood pressure .  Reschedule labs from May 2020         SCREENINGS   Colonoscopy 2015, return in 10 years   prostate testing 2016/8      Immunizations   Pneumovax 2019  PCV 10/29/2018   Flu: utd     Hepatitis B series up-to-date   Patient states he had tetanus shot around 2015

## 2020-08-31 NOTE — PATIENT INSTRUCTIONS
Blood pressure  Increase nifedipine to 90 mg daily, continue lisinopril 40 mg    Diabetes  Increase levemir to 80 units daily  Keep novolog at 20 units with meals  Keep trulicity at 1.5 mg weekly      Goal morning sugars between 70 and 140  Goal later pre meal sugars less than 180      Anxiety and sleep  Continue zoloft 100 mg daily   STOP amitriptyline 50 mg   START trazodone 50 mg every night.       Erectile functioning  Stop viagra  Start cialis 10 mg as needed.         Www.Diabetes.org (american diabetes association website)    SAMPLE BLOOD SUGAR CHART  Goal blood sugar when checked in the morning before meals: less than 130-140  Goal sugar when checked at least 2 hours after a meal: less than 180               DATE  Before breakfast Before lunch Before dinner Before bedtime                                                                                                                                                  CARBOHYDRATE GOALS: around 3-4 servings per meal (1 serving is 15 grams of total carbohydrates)

## 2020-09-01 ENCOUNTER — LAB VISIT (OUTPATIENT)
Dept: LAB | Facility: OTHER | Age: 65
End: 2020-09-01
Attending: FAMILY MEDICINE

## 2020-09-01 DIAGNOSIS — G89.4 CHRONIC PAIN SYNDROME: ICD-10-CM

## 2020-09-01 DIAGNOSIS — I73.9 PAD (PERIPHERAL ARTERY DISEASE): ICD-10-CM

## 2020-09-01 DIAGNOSIS — I10 HYPERTENSION, UNSPECIFIED TYPE: ICD-10-CM

## 2020-09-01 DIAGNOSIS — E03.9 HYPOTHYROIDISM, UNSPECIFIED TYPE: ICD-10-CM

## 2020-09-01 DIAGNOSIS — E78.1 HYPERTRIGLYCERIDEMIA: ICD-10-CM

## 2020-09-01 DIAGNOSIS — D84.9 IMMUNOSUPPRESSION: ICD-10-CM

## 2020-09-01 DIAGNOSIS — E11.65 UNCONTROLLED TYPE 2 DIABETES MELLITUS WITH HYPERGLYCEMIA: ICD-10-CM

## 2020-09-01 DIAGNOSIS — Z12.5 SCREENING FOR MALIGNANT NEOPLASM OF PROSTATE: ICD-10-CM

## 2020-09-01 DIAGNOSIS — N18.30 CKD (CHRONIC KIDNEY DISEASE), STAGE III: ICD-10-CM

## 2020-09-01 DIAGNOSIS — Z94.4 LIVER TRANSPLANT RECIPIENT: ICD-10-CM

## 2020-09-01 DIAGNOSIS — E66.01 SEVERE OBESITY (BMI 35.0-35.9 WITH COMORBIDITY): ICD-10-CM

## 2020-09-01 DIAGNOSIS — Z86.19 HISTORY OF HEPATITIS C: ICD-10-CM

## 2020-09-01 LAB
ALBUMIN SERPL BCP-MCNC: 3.5 G/DL (ref 3.5–5.2)
ALP SERPL-CCNC: 88 U/L (ref 55–135)
ALT SERPL W/O P-5'-P-CCNC: 30 U/L (ref 10–44)
ANION GAP SERPL CALC-SCNC: 13 MMOL/L (ref 8–16)
AST SERPL-CCNC: 29 U/L (ref 10–40)
BILIRUB SERPL-MCNC: 0.4 MG/DL (ref 0.1–1)
BUN SERPL-MCNC: 18 MG/DL (ref 8–23)
CALCIUM SERPL-MCNC: 9 MG/DL (ref 8.7–10.5)
CHLORIDE SERPL-SCNC: 101 MMOL/L (ref 95–110)
CHOLEST SERPL-MCNC: 172 MG/DL (ref 120–199)
CHOLEST/HDLC SERPL: 6.6 {RATIO} (ref 2–5)
CO2 SERPL-SCNC: 25 MMOL/L (ref 23–29)
COMPLEXED PSA SERPL-MCNC: 0.18 NG/ML (ref 0–4)
CREAT SERPL-MCNC: 1.7 MG/DL (ref 0.5–1.4)
EST. GFR  (AFRICAN AMERICAN): 49 ML/MIN/1.73 M^2
EST. GFR  (NON AFRICAN AMERICAN): 42 ML/MIN/1.73 M^2
GLUCOSE SERPL-MCNC: 275 MG/DL (ref 70–110)
HDLC SERPL-MCNC: 26 MG/DL (ref 40–75)
HDLC SERPL: 15.1 % (ref 20–50)
LDLC SERPL CALC-MCNC: 70.4 MG/DL (ref 63–159)
NONHDLC SERPL-MCNC: 146 MG/DL
POTASSIUM SERPL-SCNC: 3.3 MMOL/L (ref 3.5–5.1)
PROT SERPL-MCNC: 7 G/DL (ref 6–8.4)
SODIUM SERPL-SCNC: 139 MMOL/L (ref 136–145)
TRIGL SERPL-MCNC: 378 MG/DL (ref 30–150)
TSH SERPL DL<=0.005 MIU/L-ACNC: 2.51 UIU/ML (ref 0.4–4)

## 2020-09-01 PROCEDURE — 83036 HEMOGLOBIN GLYCOSYLATED A1C: CPT

## 2020-09-01 PROCEDURE — 84443 ASSAY THYROID STIM HORMONE: CPT

## 2020-09-01 PROCEDURE — 80053 COMPREHEN METABOLIC PANEL: CPT

## 2020-09-01 PROCEDURE — 80061 LIPID PANEL: CPT

## 2020-09-01 PROCEDURE — 36415 COLL VENOUS BLD VENIPUNCTURE: CPT

## 2020-09-01 PROCEDURE — 84153 ASSAY OF PSA TOTAL: CPT

## 2020-09-02 ENCOUNTER — TELEPHONE (OUTPATIENT)
Dept: PAIN MEDICINE | Facility: CLINIC | Age: 65
End: 2020-09-02

## 2020-09-02 ENCOUNTER — PATIENT OUTREACH (OUTPATIENT)
Dept: ADMINISTRATIVE | Facility: OTHER | Age: 65
End: 2020-09-02

## 2020-09-02 LAB
ESTIMATED AVG GLUCOSE: 295 MG/DL (ref 68–131)
HBA1C MFR BLD HPLC: 11.9 % (ref 4–5.6)

## 2020-09-02 NOTE — TELEPHONE ENCOUNTER
----- Message from Noah Mendes, Patient Care Assistant sent at 9/1/2020  4:27 PM CDT -----  Name of Who is Calling: VIRGIL GR [[id number]]    What is the request in detail: Requesting a call back in regards of appointment. Please contact to further discuss and advise      Can the clinic reply by [facility]: No    [facility]: [phone]

## 2020-09-02 NOTE — TELEPHONE ENCOUNTER
Staff contacted patient regarding message about scheduled appointment     Staff left patient a voicemail to return our office a return call

## 2020-09-03 ENCOUNTER — TELEPHONE (OUTPATIENT)
Dept: PAIN MEDICINE | Facility: CLINIC | Age: 65
End: 2020-09-03

## 2020-09-03 ENCOUNTER — OFFICE VISIT (OUTPATIENT)
Dept: PAIN MEDICINE | Facility: CLINIC | Age: 65
End: 2020-09-03
Payer: MEDICARE

## 2020-09-03 VITALS
DIASTOLIC BLOOD PRESSURE: 97 MMHG | TEMPERATURE: 99 F | OXYGEN SATURATION: 100 % | HEART RATE: 94 BPM | RESPIRATION RATE: 18 BRPM | HEIGHT: 73 IN | BODY MASS INDEX: 35.65 KG/M2 | WEIGHT: 269 LBS | SYSTOLIC BLOOD PRESSURE: 163 MMHG

## 2020-09-03 DIAGNOSIS — G89.4 CHRONIC PAIN SYNDROME: ICD-10-CM

## 2020-09-03 DIAGNOSIS — Z98.1 S/P LUMBAR SPINAL FUSION: ICD-10-CM

## 2020-09-03 DIAGNOSIS — M43.10 ACQUIRED SPONDYLOLISTHESIS: ICD-10-CM

## 2020-09-03 DIAGNOSIS — G89.4 CHRONIC PAIN DISORDER: Primary | ICD-10-CM

## 2020-09-03 DIAGNOSIS — M96.1 POSTLAMINECTOMY SYNDROME OF LUMBAR REGION: ICD-10-CM

## 2020-09-03 DIAGNOSIS — Z51.81 ENCOUNTER FOR MONITORING OPIOID MAINTENANCE THERAPY: ICD-10-CM

## 2020-09-03 DIAGNOSIS — M53.3 SACROILIAC JOINT PAIN: Primary | ICD-10-CM

## 2020-09-03 DIAGNOSIS — M51.36 DDD (DEGENERATIVE DISC DISEASE), LUMBAR: ICD-10-CM

## 2020-09-03 DIAGNOSIS — M54.16 LUMBAR RADICULOPATHY: ICD-10-CM

## 2020-09-03 DIAGNOSIS — G95.9 LUMBAR MYELOPATHY: ICD-10-CM

## 2020-09-03 DIAGNOSIS — Z79.891 ENCOUNTER FOR MONITORING OPIOID MAINTENANCE THERAPY: ICD-10-CM

## 2020-09-03 PROCEDURE — 80307 DRUG TEST PRSMV CHEM ANLYZR: CPT

## 2020-09-03 PROCEDURE — 99214 OFFICE O/P EST MOD 30 MIN: CPT | Mod: S$GLB,,, | Performed by: NURSE PRACTITIONER

## 2020-09-03 PROCEDURE — 99213 OFFICE O/P EST LOW 20 MIN: CPT | Mod: PBBFAC | Performed by: NURSE PRACTITIONER

## 2020-09-03 PROCEDURE — 99214 PR OFFICE/OUTPT VISIT, EST, LEVL IV, 30-39 MIN: ICD-10-PCS | Mod: S$GLB,,, | Performed by: NURSE PRACTITIONER

## 2020-09-03 RX ORDER — OXYCODONE HYDROCHLORIDE 15 MG/1
15 TABLET ORAL EVERY 6 HOURS PRN
Qty: 120 TABLET | Refills: 0 | Status: SHIPPED | OUTPATIENT
Start: 2020-09-03 | End: 2020-10-03

## 2020-09-03 NOTE — TELEPHONE ENCOUNTER
"----- Message from Anitha Blum sent at 9/3/2020  7:53 AM CDT -----  Regarding: Appointment Access  Name of Who is Calling:  Vick Gonzalez      What is the request in detail:   Patient called stating, "he's [place] late [place]; but intends to keep his appointment."   Please further advise.      Reply by [facility]:  no      Call Back: [phone]                                        "

## 2020-09-03 NOTE — TELEPHONE ENCOUNTER
----- Message from Leonarda Goodwin sent at 9/3/2020 10:03 AM CDT -----  Contact: [facility] ([place] - [address] [facility] [address]  Type: Patient Call Back    Who called: [facility] ([place] - [facility] [address]    What is the request in detail: Patient is requesting a call back. She states that the insurance company will only cover a 7 days supply. She was calling to see if the patient can be self pay for the other pills.   Please advise.    Can the clinic reply by [facility]? No    Best call back number: [phone]    Additional Information: N/A

## 2020-09-03 NOTE — TELEPHONE ENCOUNTER
Staff returned call to pharmacist in regards to patient insurance company will only cover 7 day supply of medication tablets, and if patient wishes to self pay for the full amount tablets he can do so, if not patient can receive the 7 day supply and contact clinical staff for a prescription to receive the remainder tablets.

## 2020-09-03 NOTE — PROGRESS NOTES
Chronic Pain- -Established Note (Follow up visit)        SUBJECTIVE:    Interval History 9/3/2020:  The patient returns to clinic today for follow up of low back pain. He continues to report to low back pain that radiates into his buttocks bilaterally, left greater than right. He denies any radiating leg pain today. This pain is worse with prolonged sitting and activity.  He continues to take Gabapentin with benefit. He also takes Oxycodone with benefit and without adverse effects. He reports recent episodes of anxiety and depression. His PCP has recently started Zoloft which has been beneficial. He denies any other health changes. His pain today is 6/10.    Interval History 6/2/2020:  The patient returns to clinic today for follow up of low back pain. He continues to report low back pain. He does report radiating pain down the back of both legs to his feet, right greater than left. His pain is worse with prolonged standing, walking, and activity. He continues to report benefit with current medication regimen. He is currently taking Gabapentin and Oxycodone. He denies any other health changes. His pain today is 6/10.    Interval History 5/5/2020:  The patient returns for audio visit due to COVID. He continues to report low back pain with intermittent radiating pain down the back of both legs to his feet, right greater than left. His pain is worse with prolonged activity. He continues to take Gabapentin with benefit. He continues to take Oxycodone with benefit and without adverse effects. He denies any other health changes. His pain today is 5/10.    Interval History 1/28/2020:  Vick Gonzalez presents to the clinic for a follow-up appointment for lower back pain. He is s/p right SI joint injection on 12/2/2019. He reports 50% relief of his low back and buttock pain. He continues to report low back pain that radiates down the back of both legs to his feet, right greater than left. His pain is worse with prolonged  walking and activity. He continues to report benefit with current medication regimen. He continues to take Gabapentin with benefit. He continues to take Oxycodone with benefit and with adverse effects. He denies any other health changes. His pain today is 6/10.        Pain Disability Index Review:  Last 3 PDI Scores 9/3/2020 6/2/2020 1/28/2020   Pain Disability Index (PDI) 35 42 9       Pain Medications:    - Opioids: Roxicodone (Oxycodone/Acetaminophen) 15 mg QID PRN pain    Others: Gabapentin 2400 mg daily    Opioid Contract: yes     report:  Reviewed and consistent with medication use as prescribed.    Pain Procedures:   1/15/15 Right TFESI @ L5 & S1   12/2/2019- Right SI joint injection- 50% relief     Physical Therapy/Home Exercise: yes, per self    Imaging:   Lumbar MRI 6/16/15  Narrative   Technique: Routine lumbar spine MRI performed without contrast.    Comparison: MRI 06/16/2015, CT 06/15/2015.    Findings:    There are postsurgical changes consistent with L5-S1 posterior instrumented fusion with bilateral pedicular screws, vertical stabilizing rods and an intervertebral disk spacer.  When compared to the MRI dated 06/16/2015 00:33, there are new postsurgical   changes consistent with left L5 hemilaminectomy.  There is a 2.5 cm ill-defined fluid collection at the site of hemilaminectomy that is not well evaluated due to the lack of IV contrast but appears to extend anteriorly into the spinal canal and into the   left foraminal zone.  There is as possible small fluid collection within the anterior right epidural space that may also due to compression of the adjacent spinal canal.  There is stable grade I anterolistheses of L5 on S1 with persistent high signal   intensity adjacent to the left lateral aspect of the disk spacer that demonstrates moderate associated bone marrow edema of the adjacent vertebral endplates, findings that are when compared to the previous exam.  Note is made that there is an  apparent   high signal intensity within the nerve roots posterior to the L5-S1 level that was not definitely present on the previous exam.    There is mild persistent height loss loss involving the L5 vertebral body, likely degenerative in nature.  Remaining vertebral body heights are well-maintained without findings to suggest acute fracture.    There is mild intervertebral disk spacing and desiccation at L4-L5 with a small circumferential disk bulge. No disk protrusion or extrusion. There is moderate bilateral facet arthropathy and mild bilateral uncomfortable and buckling at this level.  These   findings contribute to mild spinal canal stenosis and mild bilateral foraminal narrowing.    Noting aforementioned postsurgical changes, there is persistent severe bilateral foraminal narrowing that is difficult to accurately characterize due to adjacent artifact.    There is edema anterior to the L4, L5 and S1 vertebral bodies, presumably postsurgical in nature.  No drainable fluid collections identified. Visualized retroperitoneal organs are unremarkable.   Impression       Postsurgical changes of L5-S1 posterior spinal fusion and left L5 hemilaminectomy. There is an ill-defined fluid collection at the site of hemilaminectomy that is not well evaluated due to the lack of IV contrast but appears to extend anteriorly with   suspected resulting mass effect on the spinal canal and the left foraminal zone.  There is a suspected small fluid collection within the anterior right epidural space that may contribute to additional mass effect on the adjacent spinal canal. While   findings may represent sequela of expected postsurgical changes, continued follow-up is advised to ensure improvement and/or resolution.    Persistent abnormal high signal intensity adjacent to the left lateral aspect of the intervertebral disk spacer with moderate associated bone marrow edema of the adjacent vertebral endplates. Findings are similar when  compared to the previous exam could   represent postsurgical changes. Early infection could have a similar appearance and is possible. Continued follow-up is advised.    Apparent high signal intensity within the nerve roots posterior to the L5-S1 level that was not definitely present on the previous exam. Findings may be artifactual in nature however, sequela of nerve root inflammation/edema is possible noting recent   surgery.    This report has been flagged in the Epic medical record     .  CMP  Sodium   Date Value Ref Range Status   09/01/2020 139 136 - 145 mmol/L Final     Potassium   Date Value Ref Range Status   09/01/2020 3.3 (L) 3.5 - 5.1 mmol/L Final     Chloride   Date Value Ref Range Status   09/01/2020 101 95 - 110 mmol/L Final     CO2   Date Value Ref Range Status   09/01/2020 25 23 - 29 mmol/L Final     Glucose   Date Value Ref Range Status   09/01/2020 275 (H) 70 - 110 mg/dL Final     BUN, Bld   Date Value Ref Range Status   09/01/2020 18 8 - 23 mg/dL Final     Creatinine   Date Value Ref Range Status   09/01/2020 1.7 (H) 0.5 - 1.4 mg/dL Final     Calcium   Date Value Ref Range Status   09/01/2020 9.0 8.7 - 10.5 mg/dL Final     Total Protein   Date Value Ref Range Status   09/01/2020 7.0 6.0 - 8.4 g/dL Final     Albumin   Date Value Ref Range Status   09/01/2020 3.5 3.5 - 5.2 g/dL Final   07/25/2018 4.5 3.6 - 5.1 g/dL Final     Comment:     @ Test Performed By:  [email or web address] [facility]  Marquis Wright M.D., Ph.D.,   [address] [place] [id number]  [facility]  [id number]       Total Bilirubin   Date Value Ref Range Status   09/01/2020 0.4 0.1 - 1.0 mg/dL Final     Comment:     For infants and newborns, interpretation of results should be based  on gestational age, weight and in agreement with clinical  observations.  Premature Infant recommended reference ranges:  Up to 24 hours.............<8.0 mg/dL  Up to 48 hours............<12.0 mg/dL  3-5  "days..................<15.0 mg/dL  6-29 days.................<15.0 mg/dL       Alkaline Phosphatase   Date Value Ref Range Status   09/01/2020 88 55 - 135 U/L Final     AST   Date Value Ref Range Status   09/01/2020 29 10 - 40 U/L Final     ALT   Date Value Ref Range Status   09/01/2020 30 10 - 44 U/L Final     Anion Gap   Date Value Ref Range Status   09/01/2020 13 8 - 16 mmol/L Final     eGFR if    Date Value Ref Range Status   09/01/2020 49 (A) >60 mL/min/1.73 m^2 Final     eGFR if non    Date Value Ref Range Status   09/01/2020 42 (A) >60 mL/min/1.73 m^2 Final     Comment:     Calculation used to obtain the estimated glomerular filtration  rate (eGFR) is the CKD-EPI equation.            Allergies:   Review of patient's allergies indicates:   Allergen Reactions    Naproxen      Other reaction(s): problems w/liver/kid    Oxaprozin      Other reaction(s): cause problems w/kid/liver       Current Medications:   Current Outpatient Medications   Medication Sig Dispense Refill    allopurinoL (ZYLOPRIM) 100 MG tablet TAKE 1 TABLET BY MOUTH TWICE A DAY 60 tablet 7    BD ULTRA-FINE MENDY PEN NEEDLES 32 gauge x 5/32" Ndle       calcium carbonate-vitamin D3 (CALTRATE 600 + D) 600 mg(1,500mg) -400 unit Chew       colchicine 0.6 mg tablet Take 1 tablet (0.6 mg total) by mouth 2 (two) times daily. 30 tablet 2    dulaglutide (TRULICITY) 1.5 mg/0.5 mL pen injector Inject 1.5 mg (1 syringe) into the skin every 7 days. 2 mL 11    gabapentin (NEURONTIN) 800 MG tablet Take 1 tablet (800 mg total) by mouth 3 (three) times daily. 90 tablet 11    insulin aspart U-100 (NOVOLOG FLEXPEN U-100 INSULIN) 100 unit/mL (3 mL) InPn pen Inject 20 Units into the skin 3 (three) times daily with meals. 15 mL 11    insulin detemir U-100 (LEVEMIR FLEXTOUCH) 100 unit/mL (3 mL) SubQ InPn pen Inject 70 Units into the skin every evening. 15 mL 11    lancets Misc 1 each by Misc.(Non-Drug; Combo Route) route 4 " "(four) times daily before meals and nightly. 200 each 11    levothyroxine (SYNTHROID) 88 MCG tablet Take 1 tablet (88 mcg total) by mouth once daily. 90 tablet 4    lisinopriL (PRINIVIL,ZESTRIL) 40 MG tablet TAKE 1 TABLET BY MOUTH EVERY DAY 90 tablet 3    metoprolol succinate (TOPROL-XL) 200 MG 24 hr tablet TAKE 1 TABLET (200 MG TOTAL) BY MOUTH ONCE DAILY. 30 tablet 11    multivitamin (THERAGRAN) per tablet Take 1 tablet by mouth.      NIFEdipine (ADALAT CC) 60 MG TbSR TAKE 1 TABLET (60 MG TOTAL) BY MOUTH ONCE DAILY. 90 tablet 1    NOVOFINE PLUS 32 gauge x 1/6" Ndle       ondansetron (ZOFRAN-ODT) 4 MG TbDL Take 1 tablet (4 mg total) by mouth every 8 (eight) hours as needed (n/v). 10 tablet 0    pantoprazole (PROTONIX) 20 MG tablet Take 1 tablet (20 mg total) by mouth once daily. 30 tablet 0    pen needle, diabetic (BD ULTRA-FINE MENDY PEN NEEDLE) 32 gauge x 5/32" Ndle TEST WITH NOVOLOG THREE TIMES A DAY WITH MEALS AND WITH LEVEMIR AT BEDTIME 100 each 11    rosuvastatin (CRESTOR) 5 MG tablet Take 1 tablet (5 mg total) by mouth once daily. 90 tablet 3    sertraline (ZOLOFT) 100 MG tablet TAKE 1 TABLET (100 MG TOTAL) BY MOUTH ONCE DAILY. 30 tablet 7    tacrolimus (PROGRAF) 1 MG Cap TAKE 2 CAPSULES (2 MG TOTAL) BY MOUTH IN THE MORNING & TAKE 1 CAPSULE (1 MG TOTAL) IN THE EVENING 90 capsule 11    tadalafiL (CIALIS) 10 MG tablet Take 1 tablet (10 mg total) by mouth daily as needed. 30 tablet 11    traZODone (DESYREL) 50 MG tablet Take 1 tablet (50 mg total) by mouth every evening. 30 tablet 11    TRUE METRIX GLUCOSE METER Misc TEST 4 (FOUR) TIMES DAILY BEFORE MEALS AND NIGHTLY. 1 each 0    TRUE METRIX GLUCOSE TEST STRIP Strp USE 3 TIMES DAILY TO TEST BLOOD GLUCOSE LEVEL 100 strip 11     No current facility-administered medications for this visit.        REVIEW OF SYSTEMS:    GENERAL:  No weight loss, malaise or fevers.  HEENT:  Negative for frequent or significant headaches.  NECK:  Negative for lumps, " goiter, pain and significant neck swelling.  RESPIRATORY:  Negative for cough, wheezing or shortness of breath.  CARDIOVASCULAR:  Negative for chest pain, leg swelling or palpitations. H/O hypertension.  GI:  Negative for abdominal discomfort, blood in stools or black stools or change in bowel habits.  MUSCULOSKELETAL:  See HPI.  SKIN:  Negative for lesions, rash, and itching.  PSYCH:  Negative for sleep disturbance, mood disorder and recent psychosocial stressors.  HEMATOLOGY/LYMPHOLOGY:  Negative for prolonged bleeding, bruising easily or swollen nodes. H/O liver transplant.  NEURO:   No history of headaches, syncope, paralysis, seizures or tremors.  ENDO: Diabetes.   All other reviewed and negative other than HPI.    Past Medical History:  Past Medical History:   Diagnosis Date    Anemia     Anxiety     Chronic pain syndrome 7/13/2011    CKD (chronic kidney disease), stage III     Diabetes mellitus type II, uncontrolled     Discitis of lumbosacral region 1/16/2015    ED (erectile dysfunction)     Encounter for blood transfusion     Genital herpes     Gout, arthritis     History of alcohol abuse     History of hepatitis C, s/p successful Rx w/ SVR24 (cure) - 5/2018 8/23/2011    Completed 24wks Epclusa + RBV w/SVR12 - 2/2018  -     History of positive PPD, treatment status unknown     Pulmonary granulomas, negative sputum cultures for AFB and indeterminate quantferon test    History of substance abuse     Hypertension     Hypothyroidism     Liver replaced by transplant 8/23/2011    DATE: 12/16/2013  LIVER BIOPSY : REASON:  hep C staging  PATHOLOGY COMMITTEE NOTE/PLAN: :grade  1 / stage 1        Pancreatitis 2016    Peptic ulcer disease        Past Surgical History:  Past Surgical History:   Procedure Laterality Date    CHOLECYSTECTOMY      INJECTION OF JOINT Right 12/2/2019    Procedure: INJECTION, JOINT SI;  Surgeon: Thu Penn MD;  Location: [facility];  Service: Pain Management;   "Laterality: Right;  RT SI JNT INJ    LIVER TRANSPLANT  06/2010    SPINE SURGERY         Family History:  Family History   Problem Relation Age of Onset    Cancer Mother     Diabetes Mother     Heart disease Mother     Diabetes Sister     Cancer Maternal Uncle 82        colon CA    Melanoma Neg Hx     Psoriasis Neg Hx     Lupus Neg Hx     Eczema Neg Hx     Acne Neg Hx        Social History:  Social History     Socioeconomic History    Marital status:      Spouse name: Not on file    Number of children: Not on file    Years of education: Not on file    Highest education level: Not on file   Occupational History    Not on file   Social Needs    Financial resource strain: Not on file    Food insecurity     Worry: Not on file     Inability: Not on file    Transportation needs     Medical: Not on file     Non-medical: Not on file   Tobacco Use    Smoking status: Former Smoker    Smokeless tobacco: Never Used   Substance and Sexual Activity    Alcohol use: No     Comment: over 5 years ago, none currently    Drug use: No    Sexual activity: Not on file   Lifestyle    Physical activity     Days per week: Not on file     Minutes per session: Not on file    Stress: Not on file   Relationships    Social connections     Talks on phone: Not on file     Gets together: Not on file     Attends Yarsani service: Not on file     Active member of club or organization: Not on file     Attends meetings of clubs or organizations: Not on file     Relationship status: Not on file   Other Topics Concern    Not on file   Social History Narrative    Not on file       OBJECTIVE:    Vitals:    09/03/20 0822   BP: (!) 163/97   Pulse: 94   Resp: 18   Temp: 98.7 °F (37.1 °C)   SpO2: 100%   Weight: 122 kg (269 lb)   Height: 6' 1" (1.854 m)   PainSc:   6       PHYSICAL EXAMINATION:     General appearance: Well appearing, in no acute distress, alert and oriented x3.  Psych:  Mood and affect appropriate.  Skin: " Skin color, texture, turgor normal, no rashes or lesions, in both upper and lower body.  Head/face:  Atraumatic, normocephalic. No palpable lymph nodes.  GI: Abdomen soft. TTP over right upper quadrant along scar.   Back: Straight leg raising in the sitting position is negative to radicular pain bilaterally. There is mild pain with palpation over lumbar facet joints bilaterally. Limited ROM with pain on flexion and extension. Positive facet loading bilaterally.   Extremities: Peripheral joint ROM is full and pain free without obvious instability or laxity in all four extremities. There is pain with palpation over SI joints bilaterally. FABERs is positive on the left, negative on the right. No deformities or skin discoloration. Good capillary refill.   Musculoskeletal:  Right plantar flexion 4/5.  All other LE strength 5/5 and symmetric.  No atrophy or tone abnormalities are noted.  Neuro: Bilateral upper and lower extremity coordination and muscle stretch reflexes are physiologic and symmetric.  Plantar response are downgoing.  Decreased sensation to anterior aspect of right foot.   Gait: Antalgic- ambulates with cane.    ASSESSMENT: 64 y.o. year old male with lower back and right leg pain, consistent with the following diagnoses:     1. Sacroiliac joint pain     2. Postlaminectomy syndrome of lumbar region     3. S/P lumbar spinal fusion     4. Lumbar radiculopathy     5. Acquired spondylolisthesis     6. Lumbar myelopathy     7. DDD (degenerative disc disease), lumbar     8. Chronic pain syndrome     9. Encounter for monitoring opioid maintenance therapy  Pain Clinic Drug Screen         PLAN:     - Previous imaging was reviewed and discussed with the patient today. Recent labs reviewed with patient today.     - Schedule for bilateral SI joint injections.     - Consider ALCIDES in the future if his radicular pain worsens.     - Patient can continue Roxicodone 15 mg QID PRN pain, #120. Refill provided today. He may call  for 2 refills.     - The patient is here today for a refill of current pain medications and they believe these provide effective pain control and improvements in quality of life.  The patient notes no serious side effects, and feels the benefits outweigh the risks.  The patient was reminded of the pain contract that they signed previously as well as the risks and benefits of the medication including possible death.  The updated [place] Board  Pharmacy prescription monitoring program was reviewed, and the patient has been found to be compliant with current treatment plan. Medication management provided by Dr. Penn.     - Continue Gabapentin 800 mg TID.     - Pain Contract signed 8/14/2019.     - Serum screen from 1/28/2020 was negative for opioids. UDS done today.     - The patient will continue a home exercise routine to help with pain and strengthening.      - RTC in 3 months.    - Counseled patient regarding the importance of constant sleeping habits and physical therapy.     - Dr. Penn was consulted on the patient and agrees with this plan.    The above plan and management options were discussed at length with patient. Patient is in agreement with the above and verbalized understanding.    Emma Batista NP  09/03/2020

## 2020-09-04 ENCOUNTER — OFFICE VISIT (OUTPATIENT)
Dept: UROLOGY | Facility: CLINIC | Age: 65
End: 2020-09-04
Payer: MEDICARE

## 2020-09-04 VITALS
WEIGHT: 268.94 LBS | BODY MASS INDEX: 35.64 KG/M2 | SYSTOLIC BLOOD PRESSURE: 174 MMHG | HEART RATE: 99 BPM | HEIGHT: 73 IN | DIASTOLIC BLOOD PRESSURE: 98 MMHG

## 2020-09-04 DIAGNOSIS — E11.65 UNCONTROLLED TYPE 2 DIABETES MELLITUS WITH HYPERGLYCEMIA: ICD-10-CM

## 2020-09-04 DIAGNOSIS — N52.9 ERECTILE DYSFUNCTION, UNSPECIFIED ERECTILE DYSFUNCTION TYPE: Primary | ICD-10-CM

## 2020-09-04 DIAGNOSIS — I10 ESSENTIAL HYPERTENSION: ICD-10-CM

## 2020-09-04 PROCEDURE — 99999 PR PBB SHADOW E&M-EST. PATIENT-LVL V: CPT | Mod: PBBFAC,,, | Performed by: STUDENT IN AN ORGANIZED HEALTH CARE EDUCATION/TRAINING PROGRAM

## 2020-09-04 PROCEDURE — 99214 OFFICE O/P EST MOD 30 MIN: CPT | Mod: S$PBB,,, | Performed by: STUDENT IN AN ORGANIZED HEALTH CARE EDUCATION/TRAINING PROGRAM

## 2020-09-04 PROCEDURE — 99214 PR OFFICE/OUTPT VISIT, EST, LEVL IV, 30-39 MIN: ICD-10-PCS | Mod: S$PBB,,, | Performed by: STUDENT IN AN ORGANIZED HEALTH CARE EDUCATION/TRAINING PROGRAM

## 2020-09-04 PROCEDURE — 99215 OFFICE O/P EST HI 40 MIN: CPT | Mod: PBBFAC,PO | Performed by: STUDENT IN AN ORGANIZED HEALTH CARE EDUCATION/TRAINING PROGRAM

## 2020-09-04 PROCEDURE — 99999 PR PBB SHADOW E&M-EST. PATIENT-LVL V: ICD-10-PCS | Mod: PBBFAC,,, | Performed by: STUDENT IN AN ORGANIZED HEALTH CARE EDUCATION/TRAINING PROGRAM

## 2020-09-04 NOTE — PROGRESS NOTES
Subjective:       Patient ID: Vick Gonzalez is a 64 y.o. male.    Chief Complaint:  Erectile dysfunction  This is a 64 y.o.  male patient that is new to me but not new to the system.  The patient is referred to me by Dr. Lopez for erectile dysfunction. Patient has a history of HTN, PAD, CKD, hepatitis C -s/p successful treatment/cure, diabetes (Ha1c 11.9 on 9/1/20; takes insulin), liver transplantation 6/2010. Medication list includes metoprolol, oxycodone (chronic).     ED- he has tried sildenafil up to 100mg dosing and notes that he does not have a response at all and is frustrated. He has not tried the cialis 10mg that Dr. Lopez prescribed.      Lab Results   Component Value Date    HGBA1C 11.9 (H) 09/01/2020       LAST PSA  Lab Results   Component Value Date    PSA 0.18 09/01/2020    PSA 0.30 09/06/2016    PSA 0.80 07/13/2012    PSA 0.53 05/12/2011    PSA 0.16 05/25/2010    PSA 0.09 12/15/2008    PSA 0.1 10/05/2006    PSADIAG 0.28 10/09/2017       Lab Results   Component Value Date    CREATININE 1.7 (H) 09/01/2020   ---  Past Medical History:   Diagnosis Date    Anemia     Anxiety     Chronic pain syndrome 7/13/2011    CKD (chronic kidney disease), stage III     Diabetes mellitus type II, uncontrolled     Discitis of lumbosacral region 1/16/2015    ED (erectile dysfunction)     Encounter for blood transfusion     Genital herpes     Gout, arthritis     History of alcohol abuse     History of hepatitis C, s/p successful Rx w/ SVR24 (cure) - 5/2018 8/23/2011    Completed 24wks Epclusa + RBV w/SVR12 - 2/2018  -     History of positive PPD, treatment status unknown     Pulmonary granulomas, negative sputum cultures for AFB and indeterminate quantferon test    History of substance abuse     Hypertension     Hypothyroidism     Liver replaced by transplant 8/23/2011    DATE: 12/16/2013  LIVER BIOPSY : REASON:  hep C staging  PATHOLOGY COMMITTEE NOTE/PLAN: :grade  1 / stage 1        Pancreatitis 2016  "   Peptic ulcer disease        Past Surgical History:   Procedure Laterality Date    CHOLECYSTECTOMY      INJECTION OF JOINT Right 12/2/2019    Procedure: INJECTION, JOINT SI;  Surgeon: Thu Penn MD;  [facility];  Service: Pain Management;  Laterality: Right;  RT SI JNT INJ    LIVER TRANSPLANT  06/2010    SPINE SURGERY         Family History   Problem Relation Age of Onset    Cancer Mother     Diabetes Mother     Heart disease Mother     Diabetes Sister     Cancer Maternal Uncle 82        colon CA    Melanoma Neg Hx     Psoriasis Neg Hx     Lupus Neg Hx     Eczema Neg Hx     Acne Neg Hx        Social History     Tobacco Use    Smoking status: Former Smoker    Smokeless tobacco: Never Used   Substance Use Topics    Alcohol use: No     Comment: over 5 years ago, none currently    Drug use: No       Current Outpatient Medications on File Prior to Visit   Medication Sig Dispense Refill    allopurinoL (ZYLOPRIM) 100 MG tablet TAKE 1 TABLET BY MOUTH TWICE A DAY 60 tablet 7    BD ULTRA-FINE MENDY PEN NEEDLES 32 gauge x 5/32" Ndle       calcium carbonate-vitamin D3 (CALTRATE 600 + D) 600 mg(1,500mg) -400 unit Chew       colchicine 0.6 mg tablet Take 1 tablet (0.6 mg total) by mouth 2 (two) times daily. 30 tablet 2    dulaglutide (TRULICITY) 1.5 mg/0.5 mL pen injector Inject 1.5 mg (1 syringe) into the skin every 7 days. 2 mL 11    gabapentin (NEURONTIN) 800 MG tablet Take 1 tablet (800 mg total) by mouth 3 (three) times daily. 90 tablet 11    insulin aspart U-100 (NOVOLOG FLEXPEN U-100 INSULIN) 100 unit/mL (3 mL) InPn pen Inject 20 Units into the skin 3 (three) times daily with meals. 15 mL 11    insulin detemir U-100 (LEVEMIR FLEXTOUCH) 100 unit/mL (3 mL) SubQ InPn pen Inject 70 Units into the skin every evening. 15 mL 11    lancets Misc 1 each by Misc.(Non-Drug; Combo Route) route 4 (four) times daily before meals and nightly. 200 each 11    levothyroxine (SYNTHROID) 88 MCG " "tablet Take 1 tablet (88 mcg total) by mouth once daily. 90 tablet 4    lisinopriL (PRINIVIL,ZESTRIL) 40 MG tablet TAKE 1 TABLET BY MOUTH EVERY DAY 90 tablet 3    metoprolol succinate (TOPROL-XL) 200 MG 24 hr tablet TAKE 1 TABLET (200 MG TOTAL) BY MOUTH ONCE DAILY. 30 tablet 11    multivitamin (THERAGRAN) per tablet Take 1 tablet by mouth.      NIFEdipine (ADALAT CC) 60 MG TbSR TAKE 1 TABLET (60 MG TOTAL) BY MOUTH ONCE DAILY. 90 tablet 1    NOVOFINE PLUS 32 gauge x 1/6" Ndle       ondansetron (ZOFRAN-ODT) 4 MG TbDL Take 1 tablet (4 mg total) by mouth every 8 (eight) hours as needed (n/v). 10 tablet 0    oxyCODONE (ROXICODONE) 15 MG Tab Take 1 tablet (15 mg total) by mouth every 6 (six) hours as needed for Pain. 120 tablet 0    pantoprazole (PROTONIX) 20 MG tablet Take 1 tablet (20 mg total) by mouth once daily. 30 tablet 0    pen needle, diabetic (BD ULTRA-FINE MENDY PEN NEEDLE) 32 gauge x 5/32" Ndle TEST WITH NOVOLOG THREE TIMES A DAY WITH MEALS AND WITH LEVEMIR AT BEDTIME 100 each 11    rosuvastatin (CRESTOR) 5 MG tablet Take 1 tablet (5 mg total) by mouth once daily. 90 tablet 3    sertraline (ZOLOFT) 100 MG tablet TAKE 1 TABLET (100 MG TOTAL) BY MOUTH ONCE DAILY. 30 tablet 7    tacrolimus (PROGRAF) 1 MG Cap TAKE 2 CAPSULES (2 MG TOTAL) BY MOUTH IN THE MORNING & TAKE 1 CAPSULE (1 MG TOTAL) IN THE EVENING 90 capsule 11    tadalafiL (CIALIS) 10 MG tablet Take 1 tablet (10 mg total) by mouth daily as needed. 30 tablet 11    traZODone (DESYREL) 50 MG tablet Take 1 tablet (50 mg total) by mouth every evening. 30 tablet 11    TRUE METRIX GLUCOSE METER Misc TEST 4 (FOUR) TIMES DAILY BEFORE MEALS AND NIGHTLY. 1 each 0    TRUE METRIX GLUCOSE TEST STRIP Strp USE 3 TIMES DAILY TO TEST BLOOD GLUCOSE LEVEL 100 strip 11    [DISCONTINUED] amitriptyline (ELAVIL) 50 MG tablet Take 1 tablet (50 mg total) by mouth every evening. For nerve pain and sleep 30 tablet 11     No current facility-administered medications " on file prior to visit.        Review of patient's allergies indicates:  No Known Allergies    Review of Systems   Constitutional: Negative for chills.   HENT: Negative for congestion.    Eyes: Negative for visual disturbance.   Respiratory: Negative for shortness of breath.    Cardiovascular: Negative for chest pain.   Gastrointestinal: Negative for abdominal distention.   Musculoskeletal: Negative for gait problem.   Skin: Negative for color change.   Neurological: Negative for dizziness.   Psychiatric/Behavioral: Negative for agitation.       Objective:      Physical Exam  Constitutional:       Appearance: He is well-developed.   HENT:      Head: Normocephalic.   Eyes:      Pupils: Pupils are equal, round, and reactive to light.   Neck:      Musculoskeletal: Normal range of motion.   Pulmonary:      Effort: Pulmonary effort is normal.   Abdominal:      Palpations: Abdomen is soft.   Musculoskeletal: Normal range of motion.   Skin:     General: Skin is warm and dry.   Neurological:      Mental Status: He is alert.         Assessment:       1. Erectile dysfunction, unspecified erectile dysfunction type    2. Uncontrolled type 2 diabetes mellitus with hyperglycemia    3. Essential hypertension        Plan:         1. Counseled pt that his erectile dysfunction is likely multifactorial. He has a history of transplant and is on immunosuppressants. He is taking chronic pain medications. He also takes metoprolol which has known side effects of lower erectile quality. Most notable however his diabetes is very poorly controlled at the moment. Counseled him that it is contributory.  2. He will try cialis 10mg, and if that does not produce satisfactory response he will try 20mg. He knows that he cannot go higher than 20mg.  3. If he fails cialis, he will  Trimix Rx and make appt and I will teach him how to inject.      Erectile dysfunction, unspecified erectile dysfunction type  -     Ambulatory referral/consult to  Urology    Uncontrolled type 2 diabetes mellitus with hyperglycemia    Essential hypertension

## 2020-09-04 NOTE — PATIENT INSTRUCTIONS
1. Try the cialis 10mg - you can try 1 tablet before sex, but the most you can take at a time is 2 tablets(for a total of 20mg) and see how your erections respond.   2. If the cialis does not work, fill out the trimix penile injection medication at [facility] in [address], [place] [address].   3. Do not ever mix oral medications for erectile dysfunction, or mix oral medication with injection. This may cause a painful erection that does not go away known as priapism.

## 2020-09-07 LAB
6MAM UR QL: NOT DETECTED
7AMINOCLONAZEPAM UR QL: NOT DETECTED
A-OH ALPRAZ UR QL: NOT DETECTED
ALPRAZ UR QL: NOT DETECTED
AMPHET UR QL SCN: NOT DETECTED
ANNOTATION COMMENT IMP: NORMAL
ANNOTATION COMMENT IMP: NORMAL
BARBITURATES UR QL: NOT DETECTED
BUPRENORPHINE UR QL: NOT DETECTED
BZE UR QL: PRESENT
CARBOXYTHC UR QL: NOT DETECTED
CARISOPRODOL UR QL: PRESENT
CLONAZEPAM UR QL: NOT DETECTED
CODEINE UR QL: NOT DETECTED
CREAT UR-MCNC: 222.9 MG/DL (ref 20–400)
DIAZEPAM UR QL: NOT DETECTED
ETHYL GLUCURONIDE UR QL: NOT DETECTED
FENTANYL UR QL: NOT DETECTED
HYDROCODONE UR QL: NOT DETECTED
HYDROMORPHONE UR QL: NOT DETECTED
LORAZEPAM UR QL: NOT DETECTED
MDA UR QL: NOT DETECTED
MDEA UR QL: NOT DETECTED
MDMA UR QL: NOT DETECTED
ME-PHENIDATE UR QL: NOT DETECTED
MEPERIDINE UR QL: NOT DETECTED
METHADONE UR QL: NOT DETECTED
METHAMPHET UR QL: NOT DETECTED
MIDAZOLAM UR QL SCN: NOT DETECTED
MORPHINE UR QL: NOT DETECTED
NORBUPRENORPHINE UR QL CFM: NOT DETECTED
NORDIAZEPAM UR QL: NOT DETECTED
NORFENTANYL UR QL: NOT DETECTED
NORHYDROCODONE UR QL CFM: NOT DETECTED
NOROXYCODONE UR QL CFM: NOT DETECTED
NOROXYMORPHONE: NOT DETECTED
OXAZEPAM UR QL: NOT DETECTED
OXYCODONE UR QL: NOT DETECTED
OXYMORPHONE UR QL: NOT DETECTED
PATHOLOGY STUDY: NORMAL
PCP UR QL: NOT DETECTED
PHENTERMINE UR QL: NOT DETECTED
PROPOXYPH UR QL: NOT DETECTED
SERVICE CMNT-IMP: NORMAL
TAPENTADOL UR QL SCN: NOT DETECTED
TAPENTADOL-O-SULF: NOT DETECTED
TEMAZEPAM UR QL: NOT DETECTED
TRAMADOL UR QL: NOT DETECTED
ZOLPIDEM UR QL: NOT DETECTED

## 2020-09-10 ENCOUNTER — OFFICE VISIT (OUTPATIENT)
Dept: UROLOGY | Facility: CLINIC | Age: 65
End: 2020-09-10
Payer: MEDICARE

## 2020-09-10 VITALS
WEIGHT: 268 LBS | SYSTOLIC BLOOD PRESSURE: 168 MMHG | TEMPERATURE: 98 F | DIASTOLIC BLOOD PRESSURE: 92 MMHG | HEIGHT: 73 IN | HEART RATE: 79 BPM | BODY MASS INDEX: 35.52 KG/M2

## 2020-09-10 DIAGNOSIS — E11.65 UNCONTROLLED TYPE 2 DIABETES MELLITUS WITH HYPERGLYCEMIA: ICD-10-CM

## 2020-09-10 DIAGNOSIS — N52.01 ERECTILE DYSFUNCTION DUE TO ARTERIAL INSUFFICIENCY: Primary | ICD-10-CM

## 2020-09-10 PROCEDURE — 3046F HEMOGLOBIN A1C LEVEL >9.0%: CPT | Mod: HCNC,CPTII,S$GLB, | Performed by: NURSE PRACTITIONER

## 2020-09-10 PROCEDURE — 99214 PR OFFICE/OUTPT VISIT, EST, LEVL IV, 30-39 MIN: ICD-10-PCS | Mod: HCNC,S$GLB,, | Performed by: NURSE PRACTITIONER

## 2020-09-10 PROCEDURE — 3008F PR BODY MASS INDEX (BMI) DOCUMENTED: ICD-10-PCS | Mod: HCNC,CPTII,S$GLB, | Performed by: NURSE PRACTITIONER

## 2020-09-10 PROCEDURE — 3077F PR MOST RECENT SYSTOLIC BLOOD PRESSURE >= 140 MM HG: ICD-10-PCS | Mod: HCNC,CPTII,S$GLB, | Performed by: NURSE PRACTITIONER

## 2020-09-10 PROCEDURE — 99214 OFFICE O/P EST MOD 30 MIN: CPT | Mod: HCNC,S$GLB,, | Performed by: NURSE PRACTITIONER

## 2020-09-10 PROCEDURE — 99999 PR PBB SHADOW E&M-EST. PATIENT-LVL V: ICD-10-PCS | Mod: PBBFAC,HCNC,, | Performed by: NURSE PRACTITIONER

## 2020-09-10 PROCEDURE — 3080F DIAST BP >= 90 MM HG: CPT | Mod: HCNC,CPTII,S$GLB, | Performed by: NURSE PRACTITIONER

## 2020-09-10 PROCEDURE — 3008F BODY MASS INDEX DOCD: CPT | Mod: HCNC,CPTII,S$GLB, | Performed by: NURSE PRACTITIONER

## 2020-09-10 PROCEDURE — 99999 PR PBB SHADOW E&M-EST. PATIENT-LVL V: CPT | Mod: PBBFAC,HCNC,, | Performed by: NURSE PRACTITIONER

## 2020-09-10 PROCEDURE — 3077F SYST BP >= 140 MM HG: CPT | Mod: HCNC,CPTII,S$GLB, | Performed by: NURSE PRACTITIONER

## 2020-09-10 PROCEDURE — 3046F PR MOST RECENT HEMOGLOBIN A1C LEVEL > 9.0%: ICD-10-PCS | Mod: HCNC,CPTII,S$GLB, | Performed by: NURSE PRACTITIONER

## 2020-09-10 PROCEDURE — 3080F PR MOST RECENT DIASTOLIC BLOOD PRESSURE >= 90 MM HG: ICD-10-PCS | Mod: HCNC,CPTII,S$GLB, | Performed by: NURSE PRACTITIONER

## 2020-09-10 NOTE — PROGRESS NOTES
Subjective:       Patient ID: Vick Gonzalez is a 64 y.o. male.    Chief Complaint: Other (teaching for injection )     This is a 64 y.o.  male patient that is new to me but not new to the system.   The patient was referred by Dr. Lopez for erectile dysfunction. Patient has a history of HTN, PAD, CKD, hepatitis C -s/p successful treatment/cure, diabetes (Ha1c 11.9 on 9/1/20; takes insulin), liver transplantation 6/2010. Medication list includes metoprolol, oxycodone (chronic). He has tried sildenafil up to 100mg dosing and notes that he does not have a response at all and is frustrated. He has not tried the cialis 10mg that Dr. Lopez prescribed. He then tried Cialis without success and opted for trimix injections. Patient with no complaints today.       LAST PSA  Lab Results   Component Value Date    PSA 0.18 09/01/2020    PSA 0.30 09/06/2016    PSA 0.80 07/13/2012    PSA 0.53 05/12/2011    PSA 0.16 05/25/2010    PSA 0.09 12/15/2008    PSA 0.1 10/05/2006    PSADIAG 0.28 10/09/2017       Lab Results   Component Value Date    CREATININE 1.7 (H) 09/01/2020       ---  Past Medical History:   Diagnosis Date    Anemia     Anxiety     Chronic pain syndrome 7/13/2011    CKD (chronic kidney disease), stage III     Diabetes mellitus type II, uncontrolled     Discitis of lumbosacral region 1/16/2015    ED (erectile dysfunction)     Encounter for blood transfusion     Genital herpes     Gout, arthritis     History of alcohol abuse     History of hepatitis C, s/p successful Rx w/ SVR24 (cure) - 5/2018 8/23/2011    Completed 24wks Epclusa + RBV w/SVR12 - 2/2018  -     History of positive PPD, treatment status unknown     Pulmonary granulomas, negative sputum cultures for AFB and indeterminate quantferon test    History of substance abuse     Hypertension     Hypothyroidism     Liver replaced by transplant 8/23/2011    DATE: 12/16/2013  LIVER BIOPSY : REASON:  hep C staging  PATHOLOGY COMMITTEE NOTE/PLAN: :grade  " 1 / stage 1        Pancreatitis 2016    Peptic ulcer disease        Past Surgical History:   Procedure Laterality Date    CHOLECYSTECTOMY      INJECTION OF JOINT Right 12/2/2019    Procedure: INJECTION, JOINT SI;  Surgeon: Thu Penn MD;  [facility];  Service: Pain Management;  Laterality: Right;  RT SI JNT INJ    LIVER TRANSPLANT  06/2010    SPINE SURGERY         Family History   Problem Relation Age of Onset    Cancer Mother     Diabetes Mother     Heart disease Mother     Diabetes Sister     Cancer Maternal Uncle 82        colon CA    Melanoma Neg Hx     Psoriasis Neg Hx     Lupus Neg Hx     Eczema Neg Hx     Acne Neg Hx        Social History     Tobacco Use    Smoking status: Former Smoker    Smokeless tobacco: Never Used   Substance Use Topics    Alcohol use: No     Comment: over 5 years ago, none currently    Drug use: No       Current Outpatient Medications on File Prior to Visit   Medication Sig Dispense Refill    allopurinoL (ZYLOPRIM) 100 MG tablet TAKE 1 TABLET BY MOUTH TWICE A DAY 60 tablet 7    BD ULTRA-FINE MENDY PEN NEEDLES 32 gauge x 5/32" Ndle       calcium carbonate-vitamin D3 (CALTRATE 600 + D) 600 mg(1,500mg) -400 unit Chew       colchicine 0.6 mg tablet Take 1 tablet (0.6 mg total) by mouth 2 (two) times daily. 30 tablet 2    dulaglutide (TRULICITY) 1.5 mg/0.5 mL pen injector Inject 1.5 mg (1 syringe) into the skin every 7 days. 2 mL 11    gabapentin (NEURONTIN) 800 MG tablet Take 1 tablet (800 mg total) by mouth 3 (three) times daily. 90 tablet 11    insulin aspart U-100 (NOVOLOG FLEXPEN U-100 INSULIN) 100 unit/mL (3 mL) InPn pen Inject 20 Units into the skin 3 (three) times daily with meals. 15 mL 11    insulin detemir U-100 (LEVEMIR FLEXTOUCH) 100 unit/mL (3 mL) SubQ InPn pen Inject 70 Units into the skin every evening. 15 mL 11    lancets Misc 1 each by Misc.(Non-Drug; Combo Route) route 4 (four) times daily before meals and nightly. 200 each 11 " "   levothyroxine (SYNTHROID) 88 MCG tablet Take 1 tablet (88 mcg total) by mouth once daily. 90 tablet 4    lisinopriL (PRINIVIL,ZESTRIL) 40 MG tablet TAKE 1 TABLET BY MOUTH EVERY DAY 90 tablet 3    metoprolol succinate (TOPROL-XL) 200 MG 24 hr tablet TAKE 1 TABLET (200 MG TOTAL) BY MOUTH ONCE DAILY. 30 tablet 11    multivitamin (THERAGRAN) per tablet Take 1 tablet by mouth.      NIFEdipine (ADALAT CC) 60 MG TbSR TAKE 1 TABLET (60 MG TOTAL) BY MOUTH ONCE DAILY. 90 tablet 1    NOVOFINE PLUS 32 gauge x 1/6" Ndle       ondansetron (ZOFRAN-ODT) 4 MG TbDL Take 1 tablet (4 mg total) by mouth every 8 (eight) hours as needed (n/v). 10 tablet 0    oxyCODONE (ROXICODONE) 15 MG Tab Take 1 tablet (15 mg total) by mouth every 6 (six) hours as needed for Pain. 120 tablet 0    pen needle, diabetic (BD ULTRA-FINE MENDY PEN NEEDLE) 32 gauge x 5/32" Ndle TEST WITH NOVOLOG THREE TIMES A DAY WITH MEALS AND WITH LEVEMIR AT BEDTIME 100 each 11    rosuvastatin (CRESTOR) 5 MG tablet Take 1 tablet (5 mg total) by mouth once daily. 90 tablet 3    sertraline (ZOLOFT) 100 MG tablet TAKE 1 TABLET (100 MG TOTAL) BY MOUTH ONCE DAILY. 30 tablet 7    tacrolimus (PROGRAF) 1 MG Cap TAKE 2 CAPSULES (2 MG TOTAL) BY MOUTH IN THE MORNING & TAKE 1 CAPSULE (1 MG TOTAL) IN THE EVENING 90 capsule 11    traZODone (DESYREL) 50 MG tablet Take 1 tablet (50 mg total) by mouth every evening. 30 tablet 11    TRUE METRIX GLUCOSE METER Misc TEST 4 (FOUR) TIMES DAILY BEFORE MEALS AND NIGHTLY. 1 each 0    TRUE METRIX GLUCOSE TEST STRIP Strp USE 3 TIMES DAILY TO TEST BLOOD GLUCOSE LEVEL 100 strip 11    [DISCONTINUED] tadalafiL (CIALIS) 10 MG tablet Take 1 tablet (10 mg total) by mouth daily as needed. 30 tablet 11    pantoprazole (PROTONIX) 20 MG tablet Take 1 tablet (20 mg total) by mouth once daily. 30 tablet 0    [DISCONTINUED] amitriptyline (ELAVIL) 50 MG tablet Take 1 tablet (50 mg total) by mouth every evening. For nerve pain and sleep 30 tablet " 11     No current facility-administered medications on file prior to visit.        Review of patient's allergies indicates:  No Known Allergies    Review of Systems   Constitutional: Negative for activity change and fever.   Eyes: Negative for visual disturbance.   Respiratory: Negative for shortness of breath.    Cardiovascular: Negative for chest pain.   Gastrointestinal: Negative for abdominal distention.   Genitourinary: Negative for difficulty urinating.   Musculoskeletal: Negative for gait problem.   Skin: Negative for color change.   Neurological: Negative for facial asymmetry.   Psychiatric/Behavioral: Negative for agitation and confusion.       Objective:      Physical Exam  Constitutional:       Appearance: Normal appearance. He is well-developed.   HENT:      Head: Normocephalic and atraumatic.   Neck:      Musculoskeletal: Normal range of motion and neck supple.   Pulmonary:      Effort: Pulmonary effort is normal.   Abdominal:      General: There is no distension.   Genitourinary:     Penis: Normal and uncircumcised. No phimosis, paraphimosis, erythema, tenderness, discharge, swelling or lesions.    Musculoskeletal: Normal range of motion.   Skin:     General: Skin is warm and dry.   Neurological:      Mental Status: He is alert and oriented to person, place, and time.   Psychiatric:         Mood and Affect: Mood normal.         Assessment:       1. Erectile dysfunction due to arterial insufficiency    2. Uncontrolled type 2 diabetes mellitus with hyperglycemia        Plan:         He is here today for the Intracorporal injection/ICI education and first injection.  Educational materials were supplied.     ICI is an injectable medication that consists of three different medications to produce an erection. It is known as a Trimix Compound or PEP. The medication is a compound medication and is only mixed up at certain pharmacies known as a compound pharmacy. The medication has to be stored in the  refrigerator. Improper storage decreases the effectiveness of the medication.      He was educated that it is an injection. With any injection there is risk for possible pain at the injection site as well as risk for an infection. Clean technique must be followed to help prevent infection. Proper needle disposable is necessary.     The injection is best given when standing up and the penis is positioned perpendicular to the body. The urethral opening should be facing away from the body. Injection is always given on the lateral or side of the penis. Never give the injection to the top of the penis to avoid possible trauma to arteries and veins. Never give the injection to the bottom of the penis to avoid trauma to the urethra. He should alternate the injection sites to avoid the build up of scar tissue/curvature due to multiple injections. Do not use more than 3x/week with at least 24 hours between each dose. Do not use with Cialis or Viagra.      This medication can increase the risk of erections lasting longer than 4 hours. This is known as a priapism and is a medical emergency. This requires immediate medical attention. This is main reason we give the first dose in the office today. We start at low dose and see how well the patients reacts. We can increase the medication if needed depending on the reaction the patient receives today. We discussed the fine line between enough medication for penetration and too much leading to a priapism. He voices understanding.    He witnessed me draw up 20 units and I injected him in the left lateral aspect of the penis. Within 15 minutes, he achieved an erection he was very pleased with. He was instructed to keep the dosage at 20 units. If noticing a decrease in reaction he can increase the dosage by 5 units. Patient will contact the office if continually having to increase dose, will have him come back to the office for re-teaching. He will go to the ER for an erection lasting  longer than 4 hours.    Discussed elevated BP. Patient reports he did not take all of his BP meds this morning, he will take when he gets home.      If he has any questions, he will call the office. Educational materials were given to patient and all questions were answered.    Visit encompassed 45 minutes         Erectile dysfunction due to arterial insufficiency    Uncontrolled type 2 diabetes mellitus with hyperglycemia

## 2020-09-10 NOTE — PATIENT INSTRUCTIONS
Penile Self-Injection: Notes and Precautions        Penile self-injection is a simple technique that may improve your sex life. Some men even find that self-injection leads to an increase in natural erections. If you have questions or concerns about self-injection or erectile dysfunction (ED), talk with your healthcare provider. The information on this sheet will help you get the best results.  Notes about penile self-injection  · You may feel a mild burning during injection. This is OK. But if you feel pressure or severe pain, stop the injection. There may be a problem with the injection site.  · Only inject the medicine on the side of your penis. It may not work if injected elsewhere.  · To prevent scarring, inject in a different spot each time.  · Dont use this treatment if you have a bleeding disorder or any risk of infection.  · Get medical help right away if your erection lasts longer than 4 hours.  Work with your healthcare provider  Ask how often you can safely repeat injections, as well as any other questions you have. You and your healthcare provider will talk about follow-up exams and how to get supplies. If the medicine doesnt work or stops working over time, tell your healthcare provider. Do not use more than 3x/week. You should wait at least 24 hours between each dose.      When to call your healthcare provider  Call your healthcare provider right away if any of these occur:  · An erection that lasts longer than 4  hours  · Bleeding or bruising  · Severe pain  · Scarring or curvature of the penis   Date Last Reviewed: 1/1/2017  © 9450-2042 [employer]. [address] [place] [address], [place] [address]. All rights reserved. This information is not intended as a substitute for professional medical care. Always follow your healthcare professional's instructions.      Penile Self-Injection Procedure  Self-injection is a good option if you have erectile dysfunction (ED). You insert a tiny needle  into your penis and inject a medicine. This helps your penis get hard and stay that way long enough for sex. And sex and orgasm will feel as good as always. You may be nervous about doing self-injection at first. But with practice, it will get easier. Your healthcare provider will show you how to do self-injection the first time.  Talk to your doctor about any medicines you take and any medical problems you have.      Preparing for injection  · Wash your hands well with soap and water.  · Prepare the medicine (if needed).  · Sit or  a comfortable position in a warm, well-lit room. If you need to, sit or  front of a mirror.  · Find an injection site on one side of your penis, in a place with no visible veins. (Dont inject into the top, bottom, or head of the penis.)  · Clean the injection site with an alcohol swab. Grasp the head of your penis firmly with your thumb and forefinger (dont just pinch the skin). Stretch the penis so the skin on the shaft is taut.  Injecting the medicine  · Rest your penis against your inner thigh and pull it gently toward your knee. Dont twist or rotate it. This way youll be sure to inject the medicine into the spot you chose and cleaned before.  · Hold the syringe between your thumb and fingers, like youre holding a pen. Rest your forearm on your thigh for support.  · Insert the needle at a 90° angle (perpendicular) to the shaft. Do this quickly to reduce discomfort. (The needle should go in easily. If it doesnt, stop right away.)  · Move your thumb to the plunger. Press down to inject the medicine, counting to 5.  · Remove the needle and dispose of it safely.  Gaining an erection  · Apply pressure to the injection site for a few minutes. This prevents swelling and bruising and helps spread the medicine.   · Stand up. This may help your erection develop. Foreplay often helps, too.  · Your penis should become firm within 15 to 20 minutes. The erection will last long  enough for sex, and maybe longer.  When to seek medical care  An erection that lasts longer than 4  hours  · Bleeding or bruising  · Severe pain  · Scarring or curvature of the penis   Date Last Reviewed: 1/7/2017  © 9290-7017 [employer]. [address] [place] [address], [place] [address]. All rights reserved. This information is not intended as a substitute for professional medical care. Always follow your healthcare professional's instructions.

## 2020-09-11 ENCOUNTER — TELEPHONE (OUTPATIENT)
Dept: PAIN MEDICINE | Facility: CLINIC | Age: 65
End: 2020-09-11

## 2020-09-11 DIAGNOSIS — E11.65 UNCONTROLLED TYPE 2 DIABETES MELLITUS WITH HYPERGLYCEMIA: ICD-10-CM

## 2020-09-11 NOTE — TELEPHONE ENCOUNTER
Phoned patient and discussed his UDS results. His UDS does show that he was negative for his medications and positive for cocaine and Soma. We discussed that this is a violation of his pain contract. We discussed that our office will no longer be able to write Oxycodone for him. He verbalized understanding. We discussed a weaning parameter by decreasing his medication to TID for one week, then to BID for one week, then once daily until discontinue. He may have procedures with our office if he wishes but we will no longer prescribe opioid medications. He verbalized understanding.

## 2020-09-14 ENCOUNTER — TELEPHONE (OUTPATIENT)
Dept: PAIN MEDICINE | Facility: CLINIC | Age: 65
End: 2020-09-14

## 2020-09-14 NOTE — TELEPHONE ENCOUNTER
----- Message from Sudhakar Templeton sent at 9/14/2020  4:38 PM CDT -----      Name of Who is Calling: VIRGIL GR [[id number]]      What is the request in detail: Pt returned call to office.Please contact to further discuss and advise.          Can the clinic reply by [facility]: N      What Number to Call Back if not in [facility]: [phone] Only for this matter

## 2020-09-14 NOTE — TELEPHONE ENCOUNTER
----- Message from Kamilla Shepherd sent at 9/14/2020  9:26 AM CDT -----  Contact: VIRGIL GR [[id number]]  Name of Who is Calling: VIRGIL GR [[id number]]      What is the request in detail: Patient would like to speak with staff regarding urine sample results and procedure. Please call       Can the clinic reply by [facility]: no      [facility]: [phone] (home)

## 2020-09-15 ENCOUNTER — HOSPITAL ENCOUNTER (OUTPATIENT)
Dept: RADIOLOGY | Facility: HOSPITAL | Age: 65
Discharge: HOME | End: 2020-09-15
Attending: INTERNAL MEDICINE
Payer: MEDICARE

## 2020-09-15 ENCOUNTER — TELEPHONE (OUTPATIENT)
Dept: TRANSPLANT | Facility: CLINIC | Age: 65
End: 2020-09-15

## 2020-09-15 ENCOUNTER — TELEPHONE (OUTPATIENT)
Dept: PAIN MEDICINE | Facility: CLINIC | Age: 65
End: 2020-09-15

## 2020-09-15 DIAGNOSIS — Z94.4 LIVER REPLACED BY TRANSPLANT: ICD-10-CM

## 2020-09-15 DIAGNOSIS — Z94.4 S/P LIVER TRANSPLANT: Primary | ICD-10-CM

## 2020-09-15 DIAGNOSIS — K74.00 HEPATIC FIBROSIS: ICD-10-CM

## 2020-09-15 PROCEDURE — 76705 ECHO EXAM OF ABDOMEN: CPT | Mod: 26,59,HCNC, | Performed by: RADIOLOGY

## 2020-09-15 PROCEDURE — 93975 US LIVER TRANSPLANT POST: ICD-10-PCS | Mod: 26,HCNC,, | Performed by: RADIOLOGY

## 2020-09-15 PROCEDURE — 93975 VASCULAR STUDY: CPT | Mod: 26,HCNC,, | Performed by: RADIOLOGY

## 2020-09-15 PROCEDURE — 93975 VASCULAR STUDY: CPT | Mod: TC,HCNC

## 2020-09-15 PROCEDURE — 76705 ECHO EXAM OF ABDOMEN: CPT | Mod: TC,HCNC

## 2020-09-15 PROCEDURE — 76705 US LIVER TRANSPLANT POST: ICD-10-PCS | Mod: 26,59,HCNC, | Performed by: RADIOLOGY

## 2020-09-15 NOTE — LETTER
September 15, 2020    Vick Gonzalez  [address] [facility] [place] [address]             [facility] 1st Fl  [address]  [place] [address]  Phone: [phone] Mr. Gonzalez,    Dr. Coleman reviewed your ultrasound. He said everything was stable. We will have you repeat another ultrasound for follow-up in March 2021.    Sincerely,    Kandy Herrera RN, BSN, CCTC  Sr Liver Transplant Coordinator

## 2020-09-15 NOTE — TELEPHONE ENCOUNTER
----- Message from Iram Fofana sent at 9/15/2020  9:54 AM CDT -----  Name of Who is Calling: VIRGIL GR      What is the request in detail: Patient would like to speak to staff in regards to wanting to know if he can still have the procedure even though he was kicked out of the program. Please advise.      Can the clinic reply by [facility]: No      What Number to Call Back if not in [facility]: [phone]

## 2020-09-15 NOTE — TELEPHONE ENCOUNTER
----- Message from Lata Gold sent at 9/15/2020  7:58 AM CDT -----  Name of Who is Calling: VIRGIL GR [[id number]]    What is the request in detail:Patient is requesting a call back regards to something find in urine and refills on pain meds  Please contact to further discuss and advise      Can the clinic reply by [facility]: No     What Number to Call Back if not in [facility]:  [phone] (home)

## 2020-09-15 NOTE — TELEPHONE ENCOUNTER
Phoned patient in response of his message.     We discussed that we can continue to provide him with procedure options. However, we will no longer provide him with opioid pain medications.     He verbalized understanding and expressed thanks for the call.

## 2020-09-15 NOTE — TELEPHONE ENCOUNTER
"Staff returned the patient's call regarding his 09/03/20 UDS ( urine results).     Patient states,"They found they a substance in my urine and I had two months of medication before the could cut me off and I would like to speak with alan about still having my procedure done and ask if I am to still get the two months of medication."    Staff informed the patient that his concerns will be presented to you for review.     Please review and advise.   "

## 2020-09-15 NOTE — TELEPHONE ENCOUNTER
Letter sent. Ultrasound stable.  ----- Message from James Coleman MD sent at 9/15/2020  2:10 PM CDT -----  Results reviewed

## 2020-09-21 ENCOUNTER — TELEPHONE (OUTPATIENT)
Dept: PAIN MEDICINE | Facility: CLINIC | Age: 65
End: 2020-09-21

## 2020-09-21 NOTE — TELEPHONE ENCOUNTER
----- Message from Roxy Campa sent at 9/21/2020  3:25 PM CDT -----   Name of Who is Calling:     What is the request in detail: patient  call to cancel procedure Please contact to further discuss and advise      Can the clinic reply by [facility]: no    What Number to Call Back if not in [facility]: [phone]

## 2020-09-29 ENCOUNTER — CLINICAL SUPPORT (OUTPATIENT)
Dept: DIABETES | Facility: CLINIC | Age: 65
End: 2020-09-29
Payer: MEDICARE

## 2020-09-29 DIAGNOSIS — E11.65 UNCONTROLLED TYPE 2 DIABETES MELLITUS WITH HYPERGLYCEMIA: ICD-10-CM

## 2020-09-29 PROCEDURE — 99999 PR PBB SHADOW E&M-EST. PATIENT-LVL I: CPT | Mod: PBBFAC,HCNC,, | Performed by: DIETITIAN, REGISTERED

## 2020-09-29 PROCEDURE — 99999 PR PBB SHADOW E&M-EST. PATIENT-LVL I: ICD-10-PCS | Mod: PBBFAC,HCNC,, | Performed by: DIETITIAN, REGISTERED

## 2020-09-29 PROCEDURE — G0108 DIAB MANAGE TRN  PER INDIV: HCPCS | Mod: HCNC,S$GLB,, | Performed by: DIETITIAN, REGISTERED

## 2020-09-29 PROCEDURE — G0108 PR DIAB MANAGE TRN  PER INDIV: ICD-10-PCS | Mod: HCNC,S$GLB,, | Performed by: DIETITIAN, REGISTERED

## 2020-09-29 NOTE — PROGRESS NOTES
Diabetes Education  Author: Randi Denton RD, CDE  Date: 9/29/2020    Diabetes Care Management Summary  Diabetes Education Record Assessment/Progress: Initial     Diabetes Type  Diabetes Type : Type II    Diabetes History  Diabetes Diagnosis: >10 years  Current Treatment: Insulin    Health Maintenance was reviewed today with patient. Discussed with patient importance of routine eye exams, foot exams/foot care, blood work (i.e.: A1c, microalbumin, and lipid), dental visits, yearly flu vaccine, and pneumonia vaccine as indicated by PCP. Patient verbalized understanding.     Health Maintenance Topics with due status: Not Due       Topic Last Completion Date    Colorectal Cancer Screening 07/10/2015    TETANUS VACCINE 10/29/2015    Lipid Panel 09/01/2020    Hemoglobin A1c 09/01/2020    Urine Drug Screen 09/03/2020     Health Maintenance Due   Topic Date Due    Complete Opioid Risk Tool  12/14/1975    Naloxone Prescription  12/14/1975    Shingles Vaccine (1 of 2) 12/14/2007    Influenza Vaccine (1) 08/01/2020    Foot Exam  10/08/2020    Eye Exam  10/29/2020       Nutrition  Meal Planning: 3 meals per day, snacks between meal, water, drinks regular soda, artificial sweeteners, eats out seldom  What type of sweetener do you use?: Equal  What type of beverages do you drink?: water, milk    Monitoring   Self Monitoring : Checks AC  Blood Glucose Logs: No  Do you use a personal continuous glucose monitor?: No  In the last month, how often have you had a low blood sugar reaction?: never  What are your symptoms of low blood sugar?: N/A  How do you treat low blood sugar?: N/A  Can you tell when your blood sugar is too high?: no  How do you treat high blood sugar?: None    Exercise   Exercise Type: walking(1 hour 2 days a week)    Current Diabetes Treatment   Current Treatment: Insulin    Social History  Preferred Learning Method: Face to Face  Primary Support: Self  Smoking Status: Ex Smoker  Alcohol Use: Never    Barriers  to Change  Barriers to Change: None  Learning Challenges : None    Readiness to Learn   Readiness to Learn : Acceptance    Cultural Influences  Cultural Influences: No    Diabetes Education Assessment/Progress  Diabetes Disease Process (diabetes disease process and treatment options): Discussion, Individual Session, Written Materials Provided, Comprehends Key Points  Nutrition (Incorporating nutritional management into one's lifestyle): Discussion, Individual Session, Written Materials Provided, Comprehends Key Points  Physical Activity (incorporating physical activity into one's lifestyle): Discussion, Individual Session, Written Materials Provided, Comprehends Key Points  Medications (states correct name, dose, onset, peak, duration, side effects & timing of meds): Discussion, Individual Session, Written Materials Provided, Comprehends Key Points  Monitoring (monitoring blood glucose/other parameters & using results): Discussion, Individual Session, Written Materials Provided, Comprehends Key Points  Acute Complications (preventing, detecting, and treating acute complications): Discussion, Individual Session, Written Materials Provided, Comprehends Key Points  Chronic Complications (preventing, detecting, and treating chronic complications): Discussion, Individual Session, Written Materials Provided, Comprehends Key Points  Clinical (diabetes, other pertinent medical history, and relevant comorbidities reviewed during visit): Discussion, Individual Session, Comprehends Key Points  Cognitive (knowledge of self-management skills, functional health literacy): Discussion, Individual Session, Comprehends Key Points  Psychosocial (emotional response to diabetes): Discussion, Individual Session, Comprehends Key Points  Diabetes Distress and Support Systems: Not Covered/Deferred  Behavioral (readiness for change, lifestyle practices, self-care behaviors): Discussion, Individual Session, Comprehends Key  Points    Goals  Patient has selected/evaluated goals during today's session: Yes, selected  Reducing Risks: Set(Eye and podiatry exams)    Diabetes Care Plan/Intervention  Education Plan/Intervention: Individual Follow-Up DSMT    Diabetes Meal Plan  Carbohydrate Per Meal: 45-60g  Carbohydrate Per Snack : 15-20g    Today's Self-Management Care Plan was developed with the patient's input and is based on barriers identified during today's assessment.    The long and short-term goals in the care plan were written with the patient/caregiver's input. The patient has agreed to work toward these goals to improve his overall diabetes control.      The patient received a copy of today's self-management plan and verbalized understanding of the care plan, goals, and all of today's instructions.      The patient was encouraged to communicate with his physician and care team regarding his condition(s) and treatment.  I provided the patient with my contact information today and encouraged him to contact me via phone or patient portal as needed.     Education Units of Time   Time Spent: 60 min

## 2020-10-06 ENCOUNTER — PATIENT OUTREACH (OUTPATIENT)
Dept: ADMINISTRATIVE | Facility: OTHER | Age: 65
End: 2020-10-06

## 2020-10-06 NOTE — PROGRESS NOTES
Health Maintenance Due   Topic Date Due    Complete Opioid Risk Tool  12/14/1975    Naloxone Prescription  12/14/1975    Shingles Vaccine (1 of 2) 12/14/2007    Influenza Vaccine (1) 08/01/2020    Foot Exam  10/08/2020     Updates were requested from care everywhere.  Chart was reviewed for overdue [facility]active [facility] Encounters ([facility]) topics (CRS, Breast Cancer Screening, Eye exam)  Health Maintenance has been updated.  LINKS immunization registry triggered.  Immunizations were reconciled.

## 2020-10-14 ENCOUNTER — TELEPHONE (OUTPATIENT)
Dept: FAMILY MEDICINE | Facility: CLINIC | Age: 65
End: 2020-10-14

## 2020-10-14 DIAGNOSIS — I73.9 PAD (PERIPHERAL ARTERY DISEASE): ICD-10-CM

## 2020-10-14 DIAGNOSIS — E11.65 UNCONTROLLED TYPE 2 DIABETES MELLITUS WITH HYPERGLYCEMIA: Primary | ICD-10-CM

## 2020-10-14 NOTE — TELEPHONE ENCOUNTER
Order placed.  Please send last notes and also Podiatry note from 2017 and my note from 2019 with his diabetic foot exam noted

## 2020-10-14 NOTE — TELEPHONE ENCOUNTER
----- Message from Karin Love sent at 10/14/2020  9:35 AM CDT -----  Contact: [phone]  Type:  Needs Medical Advice    Who Called:  PT  Reason for call: pt would like to speak with the Nurse regarding something Important  Pharmacy name and phone #:   N/A  Would the patient rather a call back or a response via [facility]?  Call Back  Best Call Back Number: [phone]

## 2020-10-21 ENCOUNTER — TELEPHONE (OUTPATIENT)
Dept: FAMILY MEDICINE | Facility: CLINIC | Age: 65
End: 2020-10-21

## 2020-10-21 NOTE — TELEPHONE ENCOUNTER
Informed pt that Dr. Lopez has not filled out his paperwork. Informed pt to call with the name and fax number of where he wants his paperwork faxed to.

## 2020-10-21 NOTE — TELEPHONE ENCOUNTER
----- Message from Naomy Lau sent at 10/21/2020  3:56 PM CDT -----  Type:  Needs Medical Advice    Who Called: Vick  Symptoms (please be specific): pt is requesting to speak with [employer] regarding an order for diabetic shoes   How long has patient had these symptoms:  unknown  Pharmacy name and phone #:  n/a  Would the patient rather a call back or a response via [facility]? Call back  Best Call Back Number: [phone]  Additional Information: phone # for the shoe place is [phone]

## 2020-10-23 DIAGNOSIS — Z94.4 LIVER REPLACED BY TRANSPLANT: ICD-10-CM

## 2020-10-23 RX ORDER — TACROLIMUS 1 MG/1
CAPSULE ORAL
Qty: 90 CAPSULE | Refills: 11 | Status: ON HOLD | OUTPATIENT
Start: 2020-10-23 | End: 2021-10-16 | Stop reason: HOSPADM

## 2020-10-27 ENCOUNTER — LAB VISIT (OUTPATIENT)
Dept: LAB | Facility: HOSPITAL | Age: 65
End: 2020-10-27
Attending: INTERNAL MEDICINE
Payer: MEDICARE

## 2020-10-27 ENCOUNTER — TELEPHONE (OUTPATIENT)
Dept: TRANSPLANT | Facility: CLINIC | Age: 65
End: 2020-10-27

## 2020-10-27 DIAGNOSIS — Z94.4 LIVER REPLACED BY TRANSPLANT: ICD-10-CM

## 2020-10-27 DIAGNOSIS — Z94.4 LIVER TRANSPLANTED: ICD-10-CM

## 2020-10-27 DIAGNOSIS — K74.00 HEPATIC FIBROSIS: Primary | ICD-10-CM

## 2020-10-27 LAB
ALBUMIN SERPL BCP-MCNC: 3.8 G/DL (ref 3.5–5.2)
ALP SERPL-CCNC: 90 U/L (ref 55–135)
ALT SERPL W/O P-5'-P-CCNC: 27 U/L (ref 10–44)
ANION GAP SERPL CALC-SCNC: 11 MMOL/L (ref 8–16)
AST SERPL-CCNC: 19 U/L (ref 10–40)
BASOPHILS # BLD AUTO: 0.04 K/UL (ref 0–0.2)
BASOPHILS NFR BLD: 0.4 % (ref 0–1.9)
BILIRUB SERPL-MCNC: 0.4 MG/DL (ref 0.1–1)
BUN SERPL-MCNC: 21 MG/DL (ref 8–23)
CALCIUM SERPL-MCNC: 9.5 MG/DL (ref 8.7–10.5)
CHLORIDE SERPL-SCNC: 101 MMOL/L (ref 95–110)
CO2 SERPL-SCNC: 25 MMOL/L (ref 23–29)
CREAT SERPL-MCNC: 1.6 MG/DL (ref 0.5–1.4)
DIFFERENTIAL METHOD: ABNORMAL
EOSINOPHIL # BLD AUTO: 0.5 K/UL (ref 0–0.5)
EOSINOPHIL NFR BLD: 4.9 % (ref 0–8)
ERYTHROCYTE [DISTWIDTH] IN BLOOD BY AUTOMATED COUNT: 13.6 % (ref 11.5–14.5)
EST. GFR  (AFRICAN AMERICAN): 52.6 ML/MIN/1.73 M^2
EST. GFR  (NON AFRICAN AMERICAN): 45.5 ML/MIN/1.73 M^2
GLUCOSE SERPL-MCNC: 265 MG/DL (ref 70–110)
HCT VFR BLD AUTO: 40.9 % (ref 40–54)
HGB BLD-MCNC: 13.2 G/DL (ref 14–18)
IMM GRANULOCYTES # BLD AUTO: 0.02 K/UL (ref 0–0.04)
IMM GRANULOCYTES NFR BLD AUTO: 0.2 % (ref 0–0.5)
LYMPHOCYTES # BLD AUTO: 3.5 K/UL (ref 1–4.8)
LYMPHOCYTES NFR BLD: 38.4 % (ref 18–48)
MCH RBC QN AUTO: 28.3 PG (ref 27–31)
MCHC RBC AUTO-ENTMCNC: 32.3 G/DL (ref 32–36)
MCV RBC AUTO: 88 FL (ref 82–98)
MONOCYTES # BLD AUTO: 0.8 K/UL (ref 0.3–1)
MONOCYTES NFR BLD: 8.2 % (ref 4–15)
NEUTROPHILS # BLD AUTO: 4.4 K/UL (ref 1.8–7.7)
NEUTROPHILS NFR BLD: 47.9 % (ref 38–73)
NRBC BLD-RTO: 0 /100 WBC
PLATELET # BLD AUTO: 139 K/UL (ref 150–350)
PMV BLD AUTO: 13.4 FL (ref 9.2–12.9)
POTASSIUM SERPL-SCNC: 4 MMOL/L (ref 3.5–5.1)
PROT SERPL-MCNC: 7.4 G/DL (ref 6–8.4)
RBC # BLD AUTO: 4.66 M/UL (ref 4.6–6.2)
SODIUM SERPL-SCNC: 137 MMOL/L (ref 136–145)
TACROLIMUS BLD-MCNC: 4.8 NG/ML (ref 5–15)
WBC # BLD AUTO: 9.14 K/UL (ref 3.9–12.7)

## 2020-10-27 PROCEDURE — 36415 COLL VENOUS BLD VENIPUNCTURE: CPT | Mod: PO

## 2020-10-27 PROCEDURE — 85025 COMPLETE CBC W/AUTO DIFF WBC: CPT

## 2020-10-27 PROCEDURE — 80053 COMPREHEN METABOLIC PANEL: CPT

## 2020-10-27 PROCEDURE — 80197 ASSAY OF TACROLIMUS: CPT

## 2020-10-27 NOTE — LETTER
October 27, 2020    Vick Carlos  [address] [facility] [address]          Dear Vick Gonzalez:  MRN: [id number]    This is a follow up to your recent labs, your lab results were stable.  There are no medicine changes.  Please have your labs drawn again on 1/25/21.      If you cannot have your labs drawn on the scheduled date, it is your responsibility to call the transplant department to reschedule.  Please call [phone] and ask to speak to [facility] -  for all scheduling requests.     Sincerely,    Kandy Herrera, RN, BSN, CCTC  Sr Liver Transplant Coordinator  [facility] [place]  [address] [place] [id number]  [phone]

## 2020-10-27 NOTE — TELEPHONE ENCOUNTER
Letter sent, labs stable and no medication changes are needed. Repeat labs due 1/25/21 per protocol.    ----- Message from James Coleman MD sent at 10/27/2020  3:18 PM CDT -----  Results reviewed

## 2020-10-30 ENCOUNTER — TELEPHONE (OUTPATIENT)
Dept: TRANSPLANT | Facility: CLINIC | Age: 65
End: 2020-10-30

## 2020-11-02 ENCOUNTER — OFFICE VISIT (OUTPATIENT)
Dept: FAMILY MEDICINE | Facility: CLINIC | Age: 65
End: 2020-11-02
Payer: MEDICARE

## 2020-11-02 VITALS
DIASTOLIC BLOOD PRESSURE: 70 MMHG | HEIGHT: 73 IN | OXYGEN SATURATION: 98 % | BODY MASS INDEX: 36.35 KG/M2 | SYSTOLIC BLOOD PRESSURE: 124 MMHG | HEART RATE: 94 BPM | WEIGHT: 274.25 LBS | TEMPERATURE: 98 F

## 2020-11-02 DIAGNOSIS — G89.4 CHRONIC PAIN SYNDROME: Primary | ICD-10-CM

## 2020-11-02 DIAGNOSIS — G89.29 CHRONIC MIDLINE LOW BACK PAIN WITHOUT SCIATICA: ICD-10-CM

## 2020-11-02 DIAGNOSIS — M54.50 CHRONIC MIDLINE LOW BACK PAIN WITHOUT SCIATICA: ICD-10-CM

## 2020-11-02 DIAGNOSIS — E66.01 SEVERE OBESITY (BMI 35.0-35.9 WITH COMORBIDITY): ICD-10-CM

## 2020-11-02 PROCEDURE — 99999 PR PBB SHADOW E&M-EST. PATIENT-LVL V: CPT | Mod: PBBFAC,,, | Performed by: FAMILY MEDICINE

## 2020-11-02 PROCEDURE — 3074F SYST BP LT 130 MM HG: CPT | Mod: CPTII,S$GLB,, | Performed by: FAMILY MEDICINE

## 2020-11-02 PROCEDURE — 99214 PR OFFICE/OUTPT VISIT, EST, LEVL IV, 30-39 MIN: ICD-10-PCS | Mod: S$GLB,,, | Performed by: FAMILY MEDICINE

## 2020-11-02 PROCEDURE — 99214 OFFICE O/P EST MOD 30 MIN: CPT | Mod: S$GLB,,, | Performed by: FAMILY MEDICINE

## 2020-11-02 PROCEDURE — 3078F DIAST BP <80 MM HG: CPT | Mod: CPTII,S$GLB,, | Performed by: FAMILY MEDICINE

## 2020-11-02 PROCEDURE — 99999 PR PBB SHADOW E&M-EST. PATIENT-LVL V: ICD-10-PCS | Mod: PBBFAC,,, | Performed by: FAMILY MEDICINE

## 2020-11-02 PROCEDURE — 3008F PR BODY MASS INDEX (BMI) DOCUMENTED: ICD-10-PCS | Mod: CPTII,S$GLB,, | Performed by: FAMILY MEDICINE

## 2020-11-02 PROCEDURE — 3078F PR MOST RECENT DIASTOLIC BLOOD PRESSURE < 80 MM HG: ICD-10-PCS | Mod: CPTII,S$GLB,, | Performed by: FAMILY MEDICINE

## 2020-11-02 PROCEDURE — 3008F BODY MASS INDEX DOCD: CPT | Mod: CPTII,S$GLB,, | Performed by: FAMILY MEDICINE

## 2020-11-02 PROCEDURE — 3074F PR MOST RECENT SYSTOLIC BLOOD PRESSURE < 130 MM HG: ICD-10-PCS | Mod: CPTII,S$GLB,, | Performed by: FAMILY MEDICINE

## 2020-11-02 RX ORDER — DICLOFENAC SODIUM 10 MG/G
2 GEL TOPICAL 4 TIMES DAILY PRN
Qty: 100 G | Refills: 4 | Status: SHIPPED | OUTPATIENT
Start: 2020-11-02 | End: 2021-10-11

## 2020-11-02 RX ORDER — AMOXICILLIN 500 MG/1
500 CAPSULE ORAL 3 TIMES DAILY
COMMUNITY
Start: 2020-09-11 | End: 2020-11-11 | Stop reason: ALTCHOICE

## 2020-11-02 NOTE — PROGRESS NOTES
(Portions of this note were dictated using voice recognition software and may contain dictation related errors in spelling/grammar/syntax not found on text review)    CC:   Chief Complaint   Patient presents with    Back Pain       HPI: 64 y.o. male   Medical follow-up visit on August 30th.  Here for urgent care concern for back pain.  Of note he does have a history of chronic back pain followed by pain management, had been on oxycodone /gabapentin.   pain management has discussed TSI and SI joint injections along with continuing current meds and physical exercise    Reviewed recent pain management documentation.  UDS recently on 09/03/2020 was positive for Soma and cocaine metabolites, negative for opiates.  He was advised this is a violation of his pain contract in pain management could no longer prescribe opiate medication for him.  They did recommend that he could still have procedures with the office.     Currently states that he has midline lumbosacral pain, no radiation.  Takes gabapentin additionally up to 2400 mg a day.  Tries to stay active, however significantly having pain    He states that he had handled some cocaine that was left on counter top by his nephew and he feels that this is how he got positive result.  He does admit to cocaine use until around 2 months ago.  Denies any current substance use.  Regarding Soma positive on urine, states that his wife placed 1 of her some medications in his medication box.  Denies taking this on a regular basis.    Past Medical History:   Diagnosis Date    Anemia     Anxiety     Chronic pain syndrome 7/13/2011    CKD (chronic kidney disease), stage III     Diabetes mellitus type II, uncontrolled     Discitis of lumbosacral region 1/16/2015    ED (erectile dysfunction)     Encounter for blood transfusion     Genital herpes     Gout, arthritis     History of alcohol abuse     History of hepatitis C, s/p successful Rx w/ SVR24 (cure) - 5/2018 8/23/2011     Completed 24wks Epclusa + RBV w/SVR12 - 2/2018  -     History of positive PPD, treatment status unknown     Pulmonary granulomas, negative sputum cultures for AFB and indeterminate quantferon test    History of substance abuse     Hypertension     Hypothyroidism     Liver replaced by transplant 8/23/2011    DATE: 12/16/2013  LIVER BIOPSY : REASON:  hep C staging  PATHOLOGY COMMITTEE NOTE/PLAN: :grade  1 / stage 1        Pancreatitis 2016    Peptic ulcer disease        Past Surgical History:   Procedure Laterality Date    CHOLECYSTECTOMY      INJECTION OF JOINT Right 12/2/2019    Procedure: INJECTION, JOINT SI;  Surgeon: Thu Penn MD;  Location: [facility];  Service: Pain Management;  Laterality: Right;  RT SI JNT INJ    LIVER TRANSPLANT  06/2010    SPINE SURGERY         Family History   Problem Relation Age of Onset    Cancer Mother     Diabetes Mother     Heart disease Mother     Diabetes Sister     Cancer Maternal Uncle 82        colon CA    Melanoma Neg Hx     Psoriasis Neg Hx     Lupus Neg Hx     Eczema Neg Hx     Acne Neg Hx        Social History     Tobacco Use    Smoking status: Former Smoker    Smokeless tobacco: Never Used   Substance Use Topics    Alcohol use: No     Comment: over 5 years ago, none currently    Drug use: Not Currently     Comment: Former cocaine use       Lab Results   Component Value Date    WBC 9.14 10/27/2020    HGB 13.2 (L) 10/27/2020    HCT 40.9 10/27/2020    MCV 88 10/27/2020     (L) 10/27/2020    CHOL 172 09/01/2020    TRIG 378 (H) 09/01/2020    HDL 26 (L) 09/01/2020    ALT 27 10/27/2020    AST 19 10/27/2020    BILITOT 0.4 10/27/2020    ALKPHOS 90 10/27/2020     10/27/2020    K 4.0 10/27/2020     10/27/2020    CREATININE 1.6 (H) 10/27/2020    ESTGFRAFRICA 52.6 (A) 10/27/2020    EGFRNONAA 45.5 (A) 10/27/2020    CALCIUM 9.5 10/27/2020    ALBUMIN 3.8 10/27/2020    BUN 21 10/27/2020    CO2 25 10/27/2020    TSH 2.508 09/01/2020    PSA  0.18 09/01/2020    PSADIAG 0.28 10/09/2017    INR 1.0 07/10/2019    HGBA1C 11.9 (H) 09/01/2020    MICALBCREAT 186.1 (H) 03/27/2017    LDLCALC 70.4 09/01/2020     (H) 10/27/2020                 ROS:  GENERAL: No fever, chills, fatigability or weight loss.  SKIN: No rashes, no itching.  HEAD: No headaches.  EYES: No visual changes  EARS: No ear pain or changes in hearing.  NOSE: No congestion or rhinorrhea.  MOUTH & THROAT: No hoarseness, change in voice, or sore throat.  NODES: Denies swollen glands.  CHEST: Denies NARAYANAN, cyanosis, wheezing, cough and sputum production.  CARDIOVASCULAR: Denies chest pain, PND, orthopnea.  ABDOMEN: No nausea, vomiting, or changes in bowel function.  URINARY: No flank pain, dysuria or hematuria.  PERIPHERAL VASCULAR: No claudication or cyanosis.  MUSCULOSKELETAL:  Above.  NEUROLOGIC: No weakness or numbness.    Vital signs reviewed  PE:   APPEARANCE: Well nourished, well developed, in no acute distress.    HEAD: Normocephalic, atraumatic.  EYES:     Conjunctivae noninjected.  NECK: Supple with no cervical lymphadenopathy.    CHEST: Good inspiratory effort. Lungs clear to auscultation with no wheezes or crackles.  CARDIOVASCULAR: Normal S1, S2. No rubs, murmurs, or gallops.  ABDOMEN: Bowel sounds normal. Soft. No tenderness or masses        IMPRESSION  1. Chronic pain syndrome    2. Severe obesity (BMI 35.0-35.9 with comorbidity)    3. Chronic midline low back pain without sciatica            PLAN  Reviewed pain management documentation  Encourage continued cessation from cocaine abuse  No opiates  Continue gabapentin  Will try diclofenac gel topically applied.  Also discussed OTC lidocaine patch which may help  Referral to physical therapy including dry needling consideration  Discussed long-term prospects of his back pain will also largely depend on weight loss which has high likelihood to help significantly with the symptoms.

## 2020-11-02 NOTE — PATIENT INSTRUCTIONS
"Over the counter pain therapies (creams/patch):    1. Lidocaine patch    2. aspercreme    3. "2 old goats"    4. "blue emu"    5. salonpas     7. biofreeze      "

## 2020-11-06 ENCOUNTER — PATIENT OUTREACH (OUTPATIENT)
Dept: ADMINISTRATIVE | Facility: OTHER | Age: 65
End: 2020-11-06

## 2020-11-06 DIAGNOSIS — E11.9 DIABETES MELLITUS WITHOUT COMPLICATION: Primary | ICD-10-CM

## 2020-11-06 NOTE — PROGRESS NOTES
Updates were requested from care everywhere.  Chart was reviewed for overdue [facility] Encounters ([facility]) topics (CRS, Breast Cancer Screening, Eye exam)  Health Maintenance has been updated.  LINKS not responding.  Order placed for diabetic eye screening photo.

## 2020-11-11 ENCOUNTER — HOSPITAL ENCOUNTER (OUTPATIENT)
Facility: HOSPITAL | Age: 65
Discharge: HOME | End: 2020-11-13
Attending: EMERGENCY MEDICINE | Admitting: HOSPITALIST
Payer: MEDICARE

## 2020-11-11 DIAGNOSIS — E86.0 DEHYDRATION: ICD-10-CM

## 2020-11-11 DIAGNOSIS — R53.1 WEAKNESS: ICD-10-CM

## 2020-11-11 DIAGNOSIS — R19.7 DIARRHEA, UNSPECIFIED TYPE: Primary | ICD-10-CM

## 2020-11-11 DIAGNOSIS — R00.0 TACHYCARDIA: ICD-10-CM

## 2020-11-11 DIAGNOSIS — A41.9 SEPSIS, DUE TO UNSPECIFIED ORGANISM, UNSPECIFIED WHETHER ACUTE ORGAN DYSFUNCTION PRESENT: ICD-10-CM

## 2020-11-11 DIAGNOSIS — R50.9 FEVER, UNSPECIFIED FEVER CAUSE: ICD-10-CM

## 2020-11-11 DIAGNOSIS — R10.11 RIGHT UPPER QUADRANT ABDOMINAL PAIN: ICD-10-CM

## 2020-11-11 PROBLEM — R74.01 TRANSAMINITIS: Status: ACTIVE | Noted: 2020-11-11

## 2020-11-11 LAB
ALBUMIN SERPL BCP-MCNC: 4.2 G/DL (ref 3.5–5.2)
ALP SERPL-CCNC: 93 U/L (ref 55–135)
ALT SERPL W/O P-5'-P-CCNC: 70 U/L (ref 10–44)
AMMONIA PLAS-SCNC: 54 UMOL/L (ref 10–50)
ANION GAP SERPL CALC-SCNC: 13 MMOL/L (ref 8–16)
AST SERPL-CCNC: 90 U/L (ref 10–40)
BACTERIA #/AREA URNS HPF: NORMAL /HPF
BASOPHILS # BLD AUTO: 0.02 K/UL (ref 0–0.2)
BASOPHILS NFR BLD: 0.2 % (ref 0–1.9)
BILIRUB SERPL-MCNC: 0.6 MG/DL (ref 0.1–1)
BILIRUB UR QL STRIP: NEGATIVE
BNP SERPL-MCNC: <10 PG/ML (ref 0–99)
BUN SERPL-MCNC: 16 MG/DL (ref 8–23)
CALCIUM SERPL-MCNC: 10.1 MG/DL (ref 8.7–10.5)
CHLORIDE SERPL-SCNC: 101 MMOL/L (ref 95–110)
CK SERPL-CCNC: 1247 U/L (ref 20–200)
CLARITY UR: CLEAR
CO2 SERPL-SCNC: 22 MMOL/L (ref 23–29)
COLOR UR: YELLOW
CREAT SERPL-MCNC: 1.4 MG/DL (ref 0.5–1.4)
CRP SERPL-MCNC: 10.3 MG/L (ref 0–8.2)
DIFFERENTIAL METHOD: ABNORMAL
EOSINOPHIL # BLD AUTO: 0.2 K/UL (ref 0–0.5)
EOSINOPHIL NFR BLD: 2.3 % (ref 0–8)
ERYTHROCYTE [DISTWIDTH] IN BLOOD BY AUTOMATED COUNT: 13.8 % (ref 11.5–14.5)
ERYTHROCYTE [SEDIMENTATION RATE] IN BLOOD BY WESTERGREN METHOD: 26 MM/HR (ref 0–10)
EST. GFR  (AFRICAN AMERICAN): >60 ML/MIN/1.73 M^2
EST. GFR  (NON AFRICAN AMERICAN): 53 ML/MIN/1.73 M^2
ESTIMATED AVG GLUCOSE: 214 MG/DL (ref 68–131)
GLUCOSE SERPL-MCNC: 243 MG/DL (ref 70–110)
GLUCOSE UR QL STRIP: ABNORMAL
HAV IGM SERPL QL IA: NEGATIVE
HBA1C MFR BLD HPLC: 9.1 % (ref 4–5.6)
HBV CORE IGM SERPL QL IA: NEGATIVE
HBV SURFACE AG SERPL QL IA: NEGATIVE
HCT VFR BLD AUTO: 39.2 % (ref 40–54)
HCV AB SERPL QL IA: POSITIVE
HGB BLD-MCNC: 13.1 G/DL (ref 14–18)
HGB UR QL STRIP: ABNORMAL
HYALINE CASTS #/AREA URNS LPF: 0 /LPF
IMM GRANULOCYTES # BLD AUTO: 0.04 K/UL (ref 0–0.04)
IMM GRANULOCYTES NFR BLD AUTO: 0.5 % (ref 0–0.5)
INFLUENZA A, MOLECULAR: NEGATIVE
INFLUENZA B, MOLECULAR: NEGATIVE
KETONES UR QL STRIP: ABNORMAL
LACTATE SERPL-SCNC: 2.3 MMOL/L (ref 0.5–2.2)
LACTATE SERPL-SCNC: 2.7 MMOL/L (ref 0.5–2.2)
LEUKOCYTE ESTERASE UR QL STRIP: NEGATIVE
LIPASE SERPL-CCNC: 53 U/L (ref 4–60)
LYMPHOCYTES # BLD AUTO: 0.6 K/UL (ref 1–4.8)
LYMPHOCYTES NFR BLD: 7 % (ref 18–48)
MAGNESIUM SERPL-MCNC: 1.1 MG/DL (ref 1.6–2.6)
MCH RBC QN AUTO: 29 PG (ref 27–31)
MCHC RBC AUTO-ENTMCNC: 33.4 G/DL (ref 32–36)
MCV RBC AUTO: 87 FL (ref 82–98)
MICROSCOPIC COMMENT: NORMAL
MONOCYTES # BLD AUTO: 0.4 K/UL (ref 0.3–1)
MONOCYTES NFR BLD: 5 % (ref 4–15)
NEUTROPHILS # BLD AUTO: 7.1 K/UL (ref 1.8–7.7)
NEUTROPHILS NFR BLD: 85 % (ref 38–73)
NITRITE UR QL STRIP: NEGATIVE
NRBC BLD-RTO: 0 /100 WBC
PH UR STRIP: 5 [PH] (ref 5–8)
PHOSPHATE SERPL-MCNC: 2.6 MG/DL (ref 2.7–4.5)
PLATELET # BLD AUTO: 117 K/UL (ref 150–350)
PMV BLD AUTO: 12.8 FL (ref 9.2–12.9)
POCT GLUCOSE: 214 MG/DL (ref 70–110)
POCT GLUCOSE: 239 MG/DL (ref 70–110)
POTASSIUM SERPL-SCNC: 4.2 MMOL/L (ref 3.5–5.1)
PROT SERPL-MCNC: 7.6 G/DL (ref 6–8.4)
PROT UR QL STRIP: ABNORMAL
RBC # BLD AUTO: 4.52 M/UL (ref 4.6–6.2)
RBC #/AREA URNS HPF: 0 /HPF (ref 0–4)
SARS-COV-2 RDRP RESP QL NAA+PROBE: NEGATIVE
SODIUM SERPL-SCNC: 136 MMOL/L (ref 136–145)
SP GR UR STRIP: 1.01 (ref 1–1.03)
SPECIMEN SOURCE: NORMAL
SQUAMOUS #/AREA URNS HPF: 2 /HPF
TROPONIN I SERPL DL<=0.01 NG/ML-MCNC: 0.01 NG/ML (ref 0–0.03)
TSH SERPL DL<=0.005 MIU/L-ACNC: 1.05 UIU/ML (ref 0.4–4)
URN SPEC COLLECT METH UR: ABNORMAL
UROBILINOGEN UR STRIP-ACNC: NEGATIVE EU/DL
WBC # BLD AUTO: 8.39 K/UL (ref 3.9–12.7)
WBC #/AREA URNS HPF: 1 /HPF (ref 0–5)

## 2020-11-11 PROCEDURE — C9399 UNCLASSIFIED DRUGS OR BIOLOG: HCPCS | Performed by: NURSE PRACTITIONER

## 2020-11-11 PROCEDURE — 83605 ASSAY OF LACTIC ACID: CPT | Mod: 91

## 2020-11-11 PROCEDURE — 25500020 PHARM REV CODE 255: Performed by: EMERGENCY MEDICINE

## 2020-11-11 PROCEDURE — 63600175 PHARM REV CODE 636 W HCPCS: Performed by: NURSE PRACTITIONER

## 2020-11-11 PROCEDURE — 84484 ASSAY OF TROPONIN QUANT: CPT

## 2020-11-11 PROCEDURE — 96365 THER/PROPH/DIAG IV INF INIT: CPT | Mod: 59

## 2020-11-11 PROCEDURE — 93005 ELECTROCARDIOGRAM TRACING: CPT

## 2020-11-11 PROCEDURE — 87040 BLOOD CULTURE FOR BACTERIA: CPT

## 2020-11-11 PROCEDURE — 25000003 PHARM REV CODE 250: Performed by: EMERGENCY MEDICINE

## 2020-11-11 PROCEDURE — 83880 ASSAY OF NATRIURETIC PEPTIDE: CPT

## 2020-11-11 PROCEDURE — 87502 INFLUENZA DNA AMP PROBE: CPT

## 2020-11-11 PROCEDURE — 96376 TX/PRO/DX INJ SAME DRUG ADON: CPT

## 2020-11-11 PROCEDURE — 25000003 PHARM REV CODE 250: Performed by: NURSE PRACTITIONER

## 2020-11-11 PROCEDURE — 83605 ASSAY OF LACTIC ACID: CPT

## 2020-11-11 PROCEDURE — 83036 HEMOGLOBIN GLYCOSYLATED A1C: CPT

## 2020-11-11 PROCEDURE — 99291 CRITICAL CARE FIRST HOUR: CPT | Mod: 25

## 2020-11-11 PROCEDURE — 86140 C-REACTIVE PROTEIN: CPT

## 2020-11-11 PROCEDURE — G0378 HOSPITAL OBSERVATION PER HR: HCPCS

## 2020-11-11 PROCEDURE — 63600175 PHARM REV CODE 636 W HCPCS: Performed by: EMERGENCY MEDICINE

## 2020-11-11 PROCEDURE — 83735 ASSAY OF MAGNESIUM: CPT

## 2020-11-11 PROCEDURE — 85025 COMPLETE CBC W/AUTO DIFF WBC: CPT

## 2020-11-11 PROCEDURE — 85652 RBC SED RATE AUTOMATED: CPT

## 2020-11-11 PROCEDURE — 96361 HYDRATE IV INFUSION ADD-ON: CPT

## 2020-11-11 PROCEDURE — 82962 GLUCOSE BLOOD TEST: CPT

## 2020-11-11 PROCEDURE — 80053 COMPREHEN METABOLIC PANEL: CPT

## 2020-11-11 PROCEDURE — 82140 ASSAY OF AMMONIA: CPT

## 2020-11-11 PROCEDURE — 80074 ACUTE HEPATITIS PANEL: CPT

## 2020-11-11 PROCEDURE — 84100 ASSAY OF PHOSPHORUS: CPT

## 2020-11-11 PROCEDURE — 81000 URINALYSIS NONAUTO W/SCOPE: CPT

## 2020-11-11 PROCEDURE — 96366 THER/PROPH/DIAG IV INF ADDON: CPT

## 2020-11-11 PROCEDURE — 83690 ASSAY OF LIPASE: CPT

## 2020-11-11 PROCEDURE — U0002 COVID-19 LAB TEST NON-CDC: HCPCS

## 2020-11-11 PROCEDURE — 96372 THER/PROPH/DIAG INJ SC/IM: CPT | Mod: 59

## 2020-11-11 PROCEDURE — 63600175 PHARM REV CODE 636 W HCPCS: Performed by: HOSPITALIST

## 2020-11-11 PROCEDURE — 84443 ASSAY THYROID STIM HORMONE: CPT

## 2020-11-11 PROCEDURE — 82550 ASSAY OF CK (CPK): CPT

## 2020-11-11 PROCEDURE — 96375 TX/PRO/DX INJ NEW DRUG ADDON: CPT

## 2020-11-11 RX ORDER — TALC
6 POWDER (GRAM) TOPICAL NIGHTLY PRN
Status: DISCONTINUED | OUTPATIENT
Start: 2020-11-11 | End: 2020-11-13 | Stop reason: HOSPADM

## 2020-11-11 RX ORDER — SODIUM CHLORIDE, SODIUM LACTATE, POTASSIUM CHLORIDE, CALCIUM CHLORIDE 600; 310; 30; 20 MG/100ML; MG/100ML; MG/100ML; MG/100ML
INJECTION, SOLUTION INTRAVENOUS CONTINUOUS
Status: DISCONTINUED | OUTPATIENT
Start: 2020-11-11 | End: 2020-11-12

## 2020-11-11 RX ORDER — IBUPROFEN 200 MG
24 TABLET ORAL
Status: DISCONTINUED | OUTPATIENT
Start: 2020-11-11 | End: 2020-11-13 | Stop reason: HOSPADM

## 2020-11-11 RX ORDER — GABAPENTIN 400 MG/1
800 CAPSULE ORAL 3 TIMES DAILY
Status: DISCONTINUED | OUTPATIENT
Start: 2020-11-11 | End: 2020-11-13 | Stop reason: HOSPADM

## 2020-11-11 RX ORDER — INSULIN ASPART 100 [IU]/ML
1-10 INJECTION, SOLUTION INTRAVENOUS; SUBCUTANEOUS
Status: DISCONTINUED | OUTPATIENT
Start: 2020-11-11 | End: 2020-11-13 | Stop reason: HOSPADM

## 2020-11-11 RX ORDER — CALCIUM CARBONATE 200(500)MG
1000 TABLET,CHEWABLE ORAL 2 TIMES DAILY
Status: DISCONTINUED | OUTPATIENT
Start: 2020-11-11 | End: 2020-11-13 | Stop reason: HOSPADM

## 2020-11-11 RX ORDER — CHOLECALCIFEROL (VITAMIN D3) 10 MCG
400 TABLET ORAL DAILY
Status: DISCONTINUED | OUTPATIENT
Start: 2020-11-11 | End: 2020-11-13 | Stop reason: HOSPADM

## 2020-11-11 RX ORDER — INSULIN ASPART 100 [IU]/ML
8 INJECTION, SOLUTION INTRAVENOUS; SUBCUTANEOUS
Status: DISCONTINUED | OUTPATIENT
Start: 2020-11-11 | End: 2020-11-13 | Stop reason: HOSPADM

## 2020-11-11 RX ORDER — GLUCAGON 1 MG
1 KIT INJECTION
Status: DISCONTINUED | OUTPATIENT
Start: 2020-11-11 | End: 2020-11-13 | Stop reason: HOSPADM

## 2020-11-11 RX ORDER — TACROLIMUS 1 MG/1
2 CAPSULE ORAL EVERY MORNING
Status: DISCONTINUED | OUTPATIENT
Start: 2020-11-11 | End: 2020-11-13 | Stop reason: HOSPADM

## 2020-11-11 RX ORDER — MAGNESIUM SULFATE HEPTAHYDRATE 40 MG/ML
2 INJECTION, SOLUTION INTRAVENOUS ONCE
Status: COMPLETED | OUTPATIENT
Start: 2020-11-11 | End: 2020-11-11

## 2020-11-11 RX ORDER — ALLOPURINOL 100 MG/1
100 TABLET ORAL 2 TIMES DAILY
Status: DISCONTINUED | OUTPATIENT
Start: 2020-11-11 | End: 2020-11-13 | Stop reason: HOSPADM

## 2020-11-11 RX ORDER — MORPHINE SULFATE 4 MG/ML
4 INJECTION, SOLUTION INTRAMUSCULAR; INTRAVENOUS
Status: COMPLETED | OUTPATIENT
Start: 2020-11-11 | End: 2020-11-11

## 2020-11-11 RX ORDER — TACROLIMUS 1 MG/1
1 CAPSULE ORAL EVERY EVENING
Status: DISCONTINUED | OUTPATIENT
Start: 2020-11-12 | End: 2020-11-13 | Stop reason: HOSPADM

## 2020-11-11 RX ORDER — ROSUVASTATIN CALCIUM 5 MG/1
5 TABLET, COATED ORAL DAILY
Status: DISCONTINUED | OUTPATIENT
Start: 2020-11-11 | End: 2020-11-13 | Stop reason: HOSPADM

## 2020-11-11 RX ORDER — SODIUM CHLORIDE 0.9 % (FLUSH) 0.9 %
3 SYRINGE (ML) INJECTION
Status: DISCONTINUED | OUTPATIENT
Start: 2020-11-11 | End: 2020-11-13 | Stop reason: HOSPADM

## 2020-11-11 RX ORDER — LEVOTHYROXINE SODIUM 88 UG/1
88 TABLET ORAL
Status: DISCONTINUED | OUTPATIENT
Start: 2020-11-12 | End: 2020-11-13 | Stop reason: HOSPADM

## 2020-11-11 RX ORDER — FERROUS SULFATE, DRIED 160(50) MG
1 TABLET, EXTENDED RELEASE ORAL 2 TIMES DAILY
Status: DISCONTINUED | OUTPATIENT
Start: 2020-11-11 | End: 2020-11-11

## 2020-11-11 RX ORDER — IBUPROFEN 200 MG
16 TABLET ORAL
Status: DISCONTINUED | OUTPATIENT
Start: 2020-11-11 | End: 2020-11-13 | Stop reason: HOSPADM

## 2020-11-11 RX ORDER — SERTRALINE HYDROCHLORIDE 100 MG/1
100 TABLET, FILM COATED ORAL DAILY
Status: DISCONTINUED | OUTPATIENT
Start: 2020-11-11 | End: 2020-11-13 | Stop reason: HOSPADM

## 2020-11-11 RX ORDER — POLYETHYLENE GLYCOL 3350 17 G/17G
17 POWDER, FOR SOLUTION ORAL DAILY
Status: DISCONTINUED | OUTPATIENT
Start: 2020-11-11 | End: 2020-11-13 | Stop reason: HOSPADM

## 2020-11-11 RX ORDER — METOPROLOL SUCCINATE 50 MG/1
200 TABLET, EXTENDED RELEASE ORAL DAILY
Status: DISCONTINUED | OUTPATIENT
Start: 2020-11-11 | End: 2020-11-13 | Stop reason: HOSPADM

## 2020-11-11 RX ORDER — ONDANSETRON 2 MG/ML
4 INJECTION INTRAMUSCULAR; INTRAVENOUS
Status: COMPLETED | OUTPATIENT
Start: 2020-11-11 | End: 2020-11-11

## 2020-11-11 RX ORDER — LISINOPRIL 20 MG/1
40 TABLET ORAL DAILY
Status: DISCONTINUED | OUTPATIENT
Start: 2020-11-11 | End: 2020-11-13 | Stop reason: HOSPADM

## 2020-11-11 RX ORDER — NIFEDIPINE 30 MG/1
60 TABLET, EXTENDED RELEASE ORAL DAILY
Status: DISCONTINUED | OUTPATIENT
Start: 2020-11-11 | End: 2020-11-13 | Stop reason: HOSPADM

## 2020-11-11 RX ORDER — ACETAMINOPHEN 500 MG
1000 TABLET ORAL EVERY 8 HOURS PRN
Status: DISCONTINUED | OUTPATIENT
Start: 2020-11-11 | End: 2020-11-13 | Stop reason: HOSPADM

## 2020-11-11 RX ORDER — ACETAMINOPHEN 325 MG/1
650 TABLET ORAL EVERY 4 HOURS PRN
Status: DISCONTINUED | OUTPATIENT
Start: 2020-11-11 | End: 2020-11-13 | Stop reason: HOSPADM

## 2020-11-11 RX ORDER — HYDROMORPHONE HYDROCHLORIDE 1 MG/ML
1 INJECTION, SOLUTION INTRAMUSCULAR; INTRAVENOUS; SUBCUTANEOUS EVERY 6 HOURS PRN
Status: DISCONTINUED | OUTPATIENT
Start: 2020-11-11 | End: 2020-11-12

## 2020-11-11 RX ORDER — TACROLIMUS 1 MG/1
1 CAPSULE ORAL EVERY EVENING
Status: DISCONTINUED | OUTPATIENT
Start: 2020-11-11 | End: 2020-11-11

## 2020-11-11 RX ORDER — TRAZODONE HYDROCHLORIDE 50 MG/1
50 TABLET ORAL NIGHTLY
Status: DISCONTINUED | OUTPATIENT
Start: 2020-11-11 | End: 2020-11-13 | Stop reason: HOSPADM

## 2020-11-11 RX ORDER — TACROLIMUS 1 MG/1
1 CAPSULE ORAL ONCE
Status: COMPLETED | OUTPATIENT
Start: 2020-11-11 | End: 2020-11-11

## 2020-11-11 RX ORDER — ENOXAPARIN SODIUM 100 MG/ML
40 INJECTION SUBCUTANEOUS EVERY 24 HOURS
Status: DISCONTINUED | OUTPATIENT
Start: 2020-11-11 | End: 2020-11-13 | Stop reason: HOSPADM

## 2020-11-11 RX ADMIN — INSULIN ASPART 8 UNITS: 100 INJECTION, SOLUTION INTRAVENOUS; SUBCUTANEOUS at 04:11

## 2020-11-11 RX ADMIN — CALCIUM CARBONATE (ANTACID) CHEW TAB 500 MG 1000 MG: 500 CHEW TAB at 09:11

## 2020-11-11 RX ADMIN — INSULIN DETEMIR 20 UNITS: 100 INJECTION, SOLUTION SUBCUTANEOUS at 09:11

## 2020-11-11 RX ADMIN — ENOXAPARIN SODIUM 40 MG: 40 INJECTION SUBCUTANEOUS at 04:11

## 2020-11-11 RX ADMIN — GABAPENTIN 800 MG: 400 CAPSULE ORAL at 09:11

## 2020-11-11 RX ADMIN — SODIUM CHLORIDE, SODIUM LACTATE, POTASSIUM CHLORIDE, AND CALCIUM CHLORIDE: .6; .31; .03; .02 INJECTION, SOLUTION INTRAVENOUS at 04:11

## 2020-11-11 RX ADMIN — MAGNESIUM SULFATE IN WATER 2 G: 40 INJECTION, SOLUTION INTRAVENOUS at 03:11

## 2020-11-11 RX ADMIN — PIPERACILLIN AND TAZOBACTAM 4.5 G: 4; .5 INJECTION, POWDER, LYOPHILIZED, FOR SOLUTION INTRAVENOUS; PARENTERAL at 03:11

## 2020-11-11 RX ADMIN — PIPERACILLIN AND TAZOBACTAM 4.5 G: 4; .5 INJECTION, POWDER, LYOPHILIZED, FOR SOLUTION INTRAVENOUS; PARENTERAL at 10:11

## 2020-11-11 RX ADMIN — MORPHINE SULFATE 4 MG: 4 INJECTION INTRAVENOUS at 06:11

## 2020-11-11 RX ADMIN — PIPERACILLIN AND TAZOBACTAM 4.5 G: 4; .5 INJECTION, POWDER, LYOPHILIZED, FOR SOLUTION INTRAVENOUS; PARENTERAL at 06:11

## 2020-11-11 RX ADMIN — TACROLIMUS 1 MG: 1 CAPSULE ORAL at 09:11

## 2020-11-11 RX ADMIN — MORPHINE SULFATE 4 MG: 4 INJECTION INTRAVENOUS at 08:11

## 2020-11-11 RX ADMIN — HYDROMORPHONE HYDROCHLORIDE 1 MG: 1 INJECTION, SOLUTION INTRAMUSCULAR; INTRAVENOUS; SUBCUTANEOUS at 09:11

## 2020-11-11 RX ADMIN — IOHEXOL 100 ML: 350 INJECTION, SOLUTION INTRAVENOUS at 10:11

## 2020-11-11 RX ADMIN — ONDANSETRON 4 MG: 2 INJECTION INTRAMUSCULAR; INTRAVENOUS at 06:11

## 2020-11-11 RX ADMIN — SODIUM CHLORIDE 1500 ML: 0.9 INJECTION, SOLUTION INTRAVENOUS at 07:11

## 2020-11-11 RX ADMIN — HYDROMORPHONE HYDROCHLORIDE 1 MG: 1 INJECTION, SOLUTION INTRAMUSCULAR; INTRAVENOUS; SUBCUTANEOUS at 02:11

## 2020-11-11 RX ADMIN — SODIUM CHLORIDE 1000 ML: 0.9 INJECTION, SOLUTION INTRAVENOUS at 05:11

## 2020-11-11 RX ADMIN — ALLOPURINOL 100 MG: 100 TABLET ORAL at 09:11

## 2020-11-11 RX ADMIN — TRAZODONE HYDROCHLORIDE 50 MG: 50 TABLET ORAL at 09:11

## 2020-11-11 NOTE — ASSESSMENT & PLAN NOTE
Glucose 200s  Check A1C  Start detemir nightly  Prandial insulin and SSI with accuchecks  Diabetic diet

## 2020-11-11 NOTE — ASSESSMENT & PLAN NOTE
Unknown source-likely viral etiology but will treat due to immunocompromised state  CT abd no acute findings, UA negative, chest Xray negative, BC pending  Cont zosyn  Cont IV fluids  Lactate trending down   Monitor temps, tylenol for fever

## 2020-11-11 NOTE — ASSESSMENT & PLAN NOTE
Lumbar radiculopathy    Reports back pain is worse than normal  Morphine given in ED  Cont neurontin  Start dialudid PRN  Hold stool softerner for now due to c/o diarrhea     done

## 2020-11-11 NOTE — ASSESSMENT & PLAN NOTE
Mild elevation  Will monitor   Cont IV fluids  Tylenol level pending  Hepatitis panel pending-history of Hep C - was treated

## 2020-11-11 NOTE — ED NOTES
"Adult Physical Assessment  LOC: Vick Gonzalez, 64 y.o. male verified via two identifiers. APPEARS ILL.The patient is awake, alert, oriented and speaking appropriately at this time.  APPEARANCE: Patient is shivering, restless and appears top be in mild distress at this time. Patient is clean and well groomed, patient's clothing is properly fastened.  SKIN:The skin is HOT and dry, color consistent with ethnicity, patient has normal skin turgor and moist mucus membranes, skin intact, no breakdown or brusing noted.  MUSCULOSKELETAL: Patient moving all extremities well, no obvious swelling or deformities noted.  RESPIRATORY: Airway is open and patent, respirations are spontaneous, patient has a normal effort and rate, no accessory muscle use noted. C/O "my breathing ain't right". SPO2 is 98 % on RA.   CARDIAC: Patient has a normal rate and rhythm, no periphreal edema noted in any extremity, capillary refill < 3 seconds in all extremities  ABDOMEN:Obese, soft and non tender to palpation, no abdominal distention noted. Bowel sounds present in all four quadrants.  NEUROLOGIC: Eyes open spontaneously, behavior appropriate to situation, follows commands, facial expression symmetrical, bilateral hand grasp equal and even, purposeful motor response noted, normal sensation in all extremities when touched with a finger.  "

## 2020-11-11 NOTE — SUBJECTIVE & OBJECTIVE
"Past Medical History:   Diagnosis Date    Anemia     Anxiety     Chronic pain syndrome 7/13/2011    CKD (chronic kidney disease), stage III     Diabetes mellitus type II, uncontrolled     Discitis of lumbosacral region 1/16/2015    ED (erectile dysfunction)     Encounter for blood transfusion     Genital herpes     Gout, arthritis     History of alcohol abuse     History of hepatitis C, s/p successful Rx w/ SVR24 (cure) - 5/2018 8/23/2011    Completed 24wks Epclusa + RBV w/SVR12 - 2/2018  -     History of positive PPD, treatment status unknown     Pulmonary granulomas, negative sputum cultures for AFB and indeterminate quantferon test    History of substance abuse     Hypertension     Hypothyroidism     Liver replaced by transplant 8/23/2011    DATE: 12/16/2013  LIVER BIOPSY : REASON:  hep C staging  PATHOLOGY COMMITTEE NOTE/PLAN: :grade  1 / stage 1        Pancreatitis 2016    Peptic ulcer disease        Past Surgical History:   Procedure Laterality Date    CHOLECYSTECTOMY      INJECTION OF JOINT Right 12/2/2019    Procedure: INJECTION, JOINT SI;  Surgeon: Thu Penn MD;  Location: [facility];  Service: Pain Management;  Laterality: Right;  RT SI JNT INJ    LIVER TRANSPLANT  06/2010    SPINE SURGERY         Review of patient's allergies indicates:  No Known Allergies    No current facility-administered medications on file prior to encounter.      Current Outpatient Medications on File Prior to Encounter   Medication Sig    allopurinoL (ZYLOPRIM) 100 MG tablet TAKE 1 TABLET BY MOUTH TWICE A DAY    BD ULTRA-FINE MENDY PEN NEEDLES 32 gauge x 5/32" Ndle     calcium carbonate-vitamin D3 (CALTRATE 600 + D) 600 mg(1,500mg) -400 unit Chew     dulaglutide (TRULICITY) 1.5 mg/0.5 mL pen injector Inject 1.5 mg (1 syringe) into the skin every 7 days.    gabapentin (NEURONTIN) 800 MG tablet Take 1 tablet (800 mg total) by mouth 3 (three) times daily.    insulin aspart U-100 (NOVOLOG FLEXPEN U-100 " "INSULIN) 100 unit/mL (3 mL) InPn pen Inject 20 Units into the skin 3 (three) times daily with meals.    insulin detemir U-100 (LEVEMIR FLEXTOUCH) 100 unit/mL (3 mL) SubQ InPn pen Inject 70 Units into the skin every evening. (Patient taking differently: Inject 80 Units into the skin every evening. )    lancets Misc 1 each by Misc.(Non-Drug; Combo Route) route 4 (four) times daily before meals and nightly.    levothyroxine (SYNTHROID) 88 MCG tablet Take 1 tablet (88 mcg total) by mouth once daily.    lisinopriL (PRINIVIL,ZESTRIL) 40 MG tablet TAKE 1 TABLET BY MOUTH EVERY DAY    metoprolol succinate (TOPROL-XL) 200 MG 24 hr tablet TAKE 1 TABLET (200 MG TOTAL) BY MOUTH ONCE DAILY.    multivitamin (THERAGRAN) per tablet Take 1 tablet by mouth.    NIFEdipine (ADALAT CC) 60 MG TbSR TAKE 1 TABLET BY MOUTH ONCE DAILY    NOVOFINE PLUS 32 gauge x 1/6" Ndle     rosuvastatin (CRESTOR) 5 MG tablet TAKE 1 TABLET BY MOUTH EVERY DAY    sertraline (ZOLOFT) 100 MG tablet TAKE 1 TABLET (100 MG TOTAL) BY MOUTH ONCE DAILY.    tacrolimus (PROGRAF) 1 MG Cap TAKE 2 CAPSULES (2 MG TOTAL) BY MOUTH IN THE MORNING & TAKE 1 CAPSULE (1 MG TOTAL) IN THE EVENING    traZODone (DESYREL) 50 MG tablet Take 1 tablet (50 mg total) by mouth every evening.    TRUE METRIX GLUCOSE METER Misc TEST 4 (FOUR) TIMES DAILY BEFORE MEALS AND NIGHTLY.    TRUE METRIX GLUCOSE TEST STRIP Strp USE 3 TIMES DAILY TO TEST BLOOD GLUCOSE LEVEL    colchicine 0.6 mg tablet Take 1 tablet (0.6 mg total) by mouth 2 (two) times daily.    diclofenac sodium (VOLTAREN) 1 % Gel Apply 2 g topically 4 (four) times daily as needed.    ondansetron (ZOFRAN-ODT) 4 MG TbDL Take 1 tablet (4 mg total) by mouth every 8 (eight) hours as needed (n/v).    pantoprazole (PROTONIX) 20 MG tablet Take 1 tablet (20 mg total) by mouth once daily.    pen needle, diabetic (BD ULTRA-FINE MENDY PEN NEEDLE) 32 gauge x 5/32" Ndle TEST WITH NOVOLOG THREE TIMES A DAY WITH MEALS AND WITH LEVEMIR " AT BEDTIME    [DISCONTINUED] amitriptyline (ELAVIL) 50 MG tablet Take 1 tablet (50 mg total) by mouth every evening. For nerve pain and sleep    [DISCONTINUED] amoxicillin (AMOXIL) 500 MG capsule Take 500 mg by mouth 3 (three) times daily.    [DISCONTINUED] NIFEdipine (ADALAT CC) 60 MG TbSR TAKE 1 TABLET (60 MG TOTAL) BY MOUTH ONCE DAILY.    [DISCONTINUED] rosuvastatin (CRESTOR) 5 MG tablet Take 1 tablet (5 mg total) by mouth once daily.     Family History     Problem Relation (Age of Onset)    Cancer Mother, Maternal Uncle (82)    Diabetes Mother, Sister    Heart disease Mother        Tobacco Use    Smoking status: Former Smoker    Smokeless tobacco: Never Used   Substance and Sexual Activity    Alcohol use: No     Comment: over 5 years ago, none currently    Drug use: Not Currently     Comment: Former cocaine use    Sexual activity: Not on file     Review of Systems   Constitutional: Positive for chills and fever. Negative for appetite change.   HENT: Negative for congestion, sore throat and trouble swallowing.    Eyes: Negative for pain, redness and visual disturbance.   Respiratory: Negative for cough and shortness of breath.    Cardiovascular: Negative for chest pain and palpitations.   Gastrointestinal: Positive for abdominal pain and diarrhea. Negative for blood in stool, constipation, nausea and vomiting.        Left flank pain   Genitourinary: Negative for difficulty urinating, dysuria and hematuria.   Musculoskeletal: Positive for back pain.   Skin: Negative for rash and wound.   Neurological: Positive for headaches. Negative for dizziness, weakness, light-headedness and numbness.   Psychiatric/Behavioral: Negative for confusion.     Objective:     Vital Signs (Most Recent):  Temp: 100 °F (37.8 °C) (11/11/20 0523)  Pulse: 74 (11/11/20 1200)  Resp: 20 (11/11/20 1200)  BP: 116/74 (11/11/20 1200)  SpO2: 97 % (11/11/20 1200) Vital Signs (24h Range):  Temp:  [100 °F (37.8 °C)] 100 °F (37.8 °C)  Pulse:   [] 74  Resp:  [12-22] 20  SpO2:  [97 %-100 %] 97 %  BP: (106-174)/(60-91) 116/74     Weight: 108.9 kg (240 lb)  Body mass index is 31.66 kg/m².    Physical Exam  Vitals signs and nursing note reviewed.   Constitutional:       General: He is not in acute distress.     Appearance: He is obese.   HENT:      Head: Normocephalic and atraumatic.      Mouth/Throat:      Mouth: Mucous membranes are moist.   Eyes:      Conjunctiva/sclera: Conjunctivae normal.   Neck:      Musculoskeletal: Neck supple.   Cardiovascular:      Rate and Rhythm: Normal rate and regular rhythm.      Pulses: Normal pulses.   Pulmonary:      Effort: Pulmonary effort is normal.      Breath sounds: Normal breath sounds.   Abdominal:      General: Bowel sounds are normal. There is no distension.      Palpations: Abdomen is soft.      Tenderness: There is abdominal tenderness.      Comments: obese   Musculoskeletal:      Right lower leg: Edema present.      Left lower leg: Edema present.   Skin:     General: Skin is warm and dry.   Neurological:      Mental Status: He is alert and oriented to person, place, and time.   Psychiatric:         Mood and Affect: Mood normal.         Behavior: Behavior normal.         Thought Content: Thought content normal.             Significant Labs:   A1C:   Recent Labs   Lab 09/01/20  0829   HGBA1C 11.9*     Blood Culture: No results for input(s): LABBLOO in the last 48 hours.  CBC:   Recent Labs   Lab 11/11/20  0551   WBC 8.39   HGB 13.1*   HCT 39.2*   *     CMP:   Recent Labs   Lab 11/11/20  0551      K 4.2      CO2 22*   *   BUN 16   CREATININE 1.4   CALCIUM 10.1   PROT 7.6   ALBUMIN 4.2   BILITOT 0.6   ALKPHOS 93   AST 90*   ALT 70*   ANIONGAP 13   EGFRNONAA 53*     Lactic Acid:   Recent Labs   Lab 11/11/20  0551 11/11/20  0907   LACTATE 2.7* 2.3*     Magnesium:   Recent Labs   Lab 11/11/20  0551   MG 1.1*     POCT Glucose:   Recent Labs   Lab 11/11/20  0827   POCTGLUCOSE 214*      Troponin:   Recent Labs   Lab 11/11/20  0551   TROPONINI 0.007     TSH:   Recent Labs   Lab 11/11/20  0551   TSH 1.046     Urine Studies:   Recent Labs   Lab 11/11/20  0945   COLORU Yellow   APPEARANCEUA Clear   PHUR 5.0   SPECGRAV 1.015   PROTEINUA 1+*   GLUCUA Trace*   KETONESU 1+*   BILIRUBINUA Negative   OCCULTUA Trace*   NITRITE Negative   UROBILINOGEN Negative   LEUKOCYTESUR Negative   RBCUA 0   WBCUA 1   BACTERIA None   SQUAMEPITHEL 2   HYALINECASTS 0       Significant Imaging: I have reviewed all pertinent imaging results/findings within the past 24 hours.

## 2020-11-11 NOTE — ASSESSMENT & PLAN NOTE
BP elevated on admit  Has been controlled since arrival  Cont home nifedipine and metoprolol  Control pain

## 2020-11-11 NOTE — ED PROVIDER NOTES
Encounter Date: 11/11/2020       History     Chief Complaint   Patient presents with    Chills     Pt presents with c/o chills, body aches, diarrhea and headache that began yesterday. No known sick contacts.     Vick Gonzalez is a 64 y.o. male who  has a past medical history of Anemia, Anxiety, Chronic pain syndrome (7/13/2011), CKD (chronic kidney disease), stage III, Diabetes mellitus type II, uncontrolled, Discitis of lumbosacral region (1/16/2015), ED (erectile dysfunction), Encounter for blood transfusion, Genital herpes, Gout, arthritis, History of alcohol abuse, History of hepatitis C, s/p successful Rx w/ SVR24 (cure) - 5/2018 (8/23/2011), History of positive PPD, treatment status unknown, History of substance abuse, Hypertension, Hypothyroidism, Liver replaced by transplant (8/23/2011), Pancreatitis (2016), and Peptic ulcer disease.    The patient presents to the ED due to fever/chills and abdominal pain.   Patient reports symptoms started this morning around 03:00.  He felt warm and flushed with chills, associated with epigastric and right upper quadrant abdominal pain.  He reports multiple episodes of watery diarrhea as well this morning.  Denies any blood in his stool.  Denies any associated vomiting.  He felt well yesterday and denies any recent illness or known sick contacts.  He has a history of liver transplant in 2011.  He is currently on Prograf.  Denies any steroid use.  He also complains of chronic lower back pain, described as bilateral in the lower lumbar region.  He has a history of back surgery to the region.  Denies any weakness/numbness, bowel/bladder incontinence, or any other complaints.        Review of patient's allergies indicates:  No Known Allergies  Past Medical History:   Diagnosis Date    Anemia     Anxiety     Chronic pain syndrome 7/13/2011    CKD (chronic kidney disease), stage III     Diabetes mellitus type II, uncontrolled     Discitis of lumbosacral region  1/16/2015    ED (erectile dysfunction)     Encounter for blood transfusion     Genital herpes     Gout, arthritis     History of alcohol abuse     History of hepatitis C, s/p successful Rx w/ SVR24 (cure) - 5/2018 8/23/2011    Completed 24wks Epclusa + RBV w/SVR12 - 2/2018  -     History of positive PPD, treatment status unknown     Pulmonary granulomas, negative sputum cultures for AFB and indeterminate quantferon test    History of substance abuse     Hypertension     Hypothyroidism     Liver replaced by transplant 8/23/2011    DATE: 12/16/2013  LIVER BIOPSY : REASON:  hep C staging  PATHOLOGY COMMITTEE NOTE/PLAN: :grade  1 / stage 1        Pancreatitis 2016    Peptic ulcer disease      Past Surgical History:   Procedure Laterality Date    CHOLECYSTECTOMY      INJECTION OF JOINT Right 12/2/2019    Procedure: INJECTION, JOINT SI;  Surgeon: Thu Penn MD;  Location: [facility];  Service: Pain Management;  Laterality: Right;  RT SI JNT INJ    LIVER TRANSPLANT  06/2010    SPINE SURGERY       Family History   Problem Relation Age of Onset    Cancer Mother     Diabetes Mother     Heart disease Mother     Diabetes Sister     Cancer Maternal Uncle 82        colon CA    Melanoma Neg Hx     Psoriasis Neg Hx     Lupus Neg Hx     Eczema Neg Hx     Acne Neg Hx      Social History     Tobacco Use    Smoking status: Former Smoker    Smokeless tobacco: Never Used   Substance Use Topics    Alcohol use: No     Comment: over 5 years ago, none currently    Drug use: Not Currently     Comment: Former cocaine use     Review of Systems   Constitutional: Positive for chills and fever. Negative for activity change and appetite change.   HENT: Negative for sore throat.    Respiratory: Negative for shortness of breath.    Cardiovascular: Negative for chest pain.   Gastrointestinal: Positive for abdominal pain and diarrhea. Negative for constipation, nausea and vomiting.   Genitourinary: Negative for  dysuria, frequency and urgency.   Musculoskeletal: Positive for back pain. Negative for myalgias, neck pain and neck stiffness.   Skin: Negative for rash and wound.   Neurological: Negative for weakness.   Hematological: Does not bruise/bleed easily.   Psychiatric/Behavioral: Negative for agitation, behavioral problems and confusion.       Physical Exam     Initial Vitals [11/11/20 0523]   BP Pulse Resp Temp SpO2   (!) 174/91 (!) 120 20 100 °F (37.8 °C) 98 %      MAP       --         Physical Exam    Nursing note and vitals reviewed.  Constitutional: He appears well-developed and well-nourished. He is not diaphoretic. No distress.   HENT:   Head: Normocephalic and atraumatic.   Mouth/Throat: Oropharynx is clear and moist.   Eyes: EOM are normal. Pupils are equal, round, and reactive to light.   Neck: No tracheal deviation present.   Cardiovascular: Normal rate, regular rhythm, normal heart sounds and intact distal pulses.   Pulmonary/Chest: Breath sounds normal. No stridor. No respiratory distress.   Abdominal: Soft. He exhibits no distension and no mass. There is abdominal tenderness in the right upper quadrant and epigastric area. There is no rigidity, no rebound, no guarding, no CVA tenderness, no tenderness at McBurney's point and negative Ann's sign.       Large chevron scar, well healed.  Right upper quadrant tenderness without rebound or guarding.   Musculoskeletal: Normal range of motion. No edema.      Cervical back: He exhibits no tenderness and no bony tenderness.      Thoracic back: He exhibits no tenderness and no bony tenderness.      Lumbar back: He exhibits tenderness. He exhibits no bony tenderness.        Back:       Comments: Bilateral lumbar paraspinous muscle tenderness.  No midline tenderness.  No swelling, bruising, or fluctuance overlying the spine.   Neurological: He is alert and oriented to person, place, and time. No cranial nerve deficit or sensory deficit.   Skin: Skin is warm and  dry. Capillary refill takes less than 2 seconds. No rash noted.   Psychiatric: He has a normal mood and affect. His behavior is normal. Thought content normal.         ED Course   Procedures  Labs Reviewed   CBC W/ AUTO DIFFERENTIAL - Abnormal; Notable for the following components:       Result Value    RBC 4.52 (*)     Hemoglobin 13.1 (*)     Hematocrit 39.2 (*)     Platelets 117 (*)     Lymph # 0.6 (*)     Gran % 85.0 (*)     Lymph % 7.0 (*)     All other components within normal limits   LACTIC ACID, PLASMA - Abnormal; Notable for the following components:    Lactate (Lactic Acid) 2.7 (*)     All other components within normal limits   MAGNESIUM - Abnormal; Notable for the following components:    Magnesium 1.1 (*)     All other components within normal limits   URINALYSIS, REFLEX TO URINE CULTURE - Abnormal; Notable for the following components:    Protein, UA 1+ (*)     Glucose, UA Trace (*)     Ketones, UA 1+ (*)     Occult Blood UA Trace (*)     All other components within normal limits    Narrative:     Specimen Source->Urine   AMMONIA - Abnormal; Notable for the following components:    Ammonia 54 (*)     All other components within normal limits   PHOSPHORUS - Abnormal; Notable for the following components:    Phosphorus 2.6 (*)     All other components within normal limits   C-REACTIVE PROTEIN - Abnormal; Notable for the following components:    CRP 10.3 (*)     All other components within normal limits   SEDIMENTATION RATE - Abnormal; Notable for the following components:    Sed Rate 26 (*)     All other components within normal limits   CK - Abnormal; Notable for the following components:    CPK 1247 (*)     All other components within normal limits   HEPATITIS PANEL, ACUTE - Abnormal; Notable for the following components:    Hepatitis C Ab Positive (*)     All other components within normal limits   COMPREHENSIVE METABOLIC PANEL - Abnormal; Notable for the following components:    CO2 22 (*)      Glucose 243 (*)     AST 90 (*)     ALT 70 (*)     eGFR if non  53 (*)     All other components within normal limits   LACTIC ACID, PLASMA - Abnormal; Notable for the following components:    Lactate (Lactic Acid) 2.3 (*)     All other components within normal limits   HEMOGLOBIN A1C - Abnormal; Notable for the following components:    Hemoglobin A1C 9.1 (*)     Estimated Avg Glucose 214 (*)     All other components within normal limits   POCT GLUCOSE - Abnormal; Notable for the following components:    POCT Glucose 214 (*)     All other components within normal limits   POCT GLUCOSE - Abnormal; Notable for the following components:    POCT Glucose 239 (*)     All other components within normal limits   CULTURE, BLOOD   CULTURE, BLOOD   INFLUENZA A & B BY MOLECULAR   B-TYPE NATRIURETIC PEPTIDE   LIPASE   TROPONIN I   TSH   SARS-COV-2 RNA AMPLIFICATION, QUAL   ACETAMINOPHEN LEVEL   HEPATITIS PANEL, ACUTE   COMPREHENSIVE METABOLIC PANEL   URINALYSIS MICROSCOPIC    Narrative:     Specimen Source->Urine   HEMOGLOBIN A1C   POCT GLUCOSE MONITORING CONTINUOUS        ECG Results          EKG 12-lead (Final result)  Result time 11/12/20 09:07:26    Final result by Interface, [facility] (11/12/20 09:07:26)                 Narrative:    Test Reason : R00.0,    Vent. Rate : 105 BPM     Atrial Rate : 105 BPM     P-R Int : 152 ms          QRS Dur : 082 ms      QT Int : 318 ms       P-R-T Axes : 068 066 075 degrees     QTc Int : 420 ms    Sinus tachycardia  Nonspecific ST abnormality  Abnormal ECG  When compared with ECG of 10-JUL-2019 14:42,  ST changes more prominent   Confirmed by Cameron Beyer MD ([id number]) on 11/12/2020 9:07:14 AM    Referred By: [email or web address]   SELF           Confirmed By:Cameron Beyer MD                            Imaging Results          CT Abdomen Pelvis With Contrast (Final result)  Result time 11/11/20 10:34:39    Final result by Ar Perez MD (11/11/20 10:34:39)                  Impression:      No acute intra-abdominal abnormalities.    Possible mild cellulitis of the anterior abdominal wall.  No focal fluid collections.    Postop changes of liver transplant with a hepatic steatosis and mild hepatomegaly.      Electronically signed by: Ar Perez MD  Date:    11/11/2020  Time:    10:34             Narrative:    EXAMINATION:  CT ABDOMEN PELVIS WITH CONTRAST    CLINICAL HISTORY:  Abdominal infection suspected;    TECHNIQUE:  Low dose axial images, sagittal and coronal reformations were obtained from the lung bases to the pubic symphysis following the IV administration of 100 mL of Omnipaque 350 .  Oral contrast was not given.    COMPARISON:  Ultrasound of the same day and prior CT of the abdomen of 01/23/2020    FINDINGS:  Heart is not enlarged.  Stable trace pericardial fluid.  There is coronary atherosclerosis.  Calcified right lower lobe nodule likely a granuloma.  Otherwise, lung bases show no acute abnormalities.    There are postop changes of liver transplant.  Liver mildly enlarged measuring 18 cm in craniocaudad diameter.  Liver is diffusely low in attenuation suggesting hepatic steatosis.  No focal liver lesions detected on single phase of imaging.  Stable mild prominence of the biliary system.  The portal vein and splenic vein and SMV are patent.  The stomach, pancreas and adrenal glands demonstrate no significant abnormalities.    Spleen is stable in size with splenic calcifications likely granulomas.    Kidneys have a slightly lobular contour.  Stable calcifications in the right renal hilum likely vascular.  No evidence of hydronephrosis.  Urinary bladder is nondistended but shows no significant abnormalities.  Prostate is normal in size.    Small and large bowel show no evidence of inflammation or obstruction.  Appendix demonstrates no significant abnormalities.  No free air or significant ascites.    Abdominal aorta is normal in caliber with moderate atherosclerosis.   Major aortic branch vessels are patent.  No abnormal lymph node enlargement.    Bones demonstrate postoperative changes at L5-S1.  No acute bony abnormalities.    Mild haziness within the subcutaneous tissues overlying the anterior abdomen with mild skin thickening could relate to mild cellulitis.                               US Liver Transplant Post (Final result)  Result time 11/11/20 09:08:27   Procedure changed from US Liver with Doppler (xpd)     Final result by Ar Perez MD (11/11/20 09:08:27)                 Impression:      Satisfactory Doppler evaluation of the liver transplant.    Hepatomegaly and hepatic steatosis.      Electronically signed by: Ar Perez MD  Date:    11/11/2020  Time:    09:08             Narrative:    EXAMINATION:  US LIVER TRANSPLANT POST    CLINICAL HISTORY:  RUQ pain, fever, eval for rejection;    TECHNIQUE:  Limited abdominal ultrasound of the transplant liver with Doppler evaluation.  Color and spectral Doppler were performed.    COMPARISON:  09/15/2020    FINDINGS:  Patient is status post orthotopic liver transplantin 2011.  Liver allograft is enlarged measuring 20.5 cm.  The liver demonstrates diffuse increased echogenicity suggesting hepatic steatosis.  No focal hepatic lesions are seen.  No fluid collections.    The common duct is upper normal, measuring 6 mm.  No dilated intrahepatic radicles are seen.    Color flow and spectral waveform analysis was performed.  The main portal vein, right portal vein, left portal vein, middle hepatic vein, right hepatic vein, left hepatic vein, and IVC are patent with proper directional flow.    Anastomosis site of the main hepatic artery demonstrates a peak systolic velocity 97 cm/sec.    Main hepatic artery demonstrates resistive index 0.68 with normal waveform.    Left hepatic artery shows resistive index 0.67 with normal waveform.    Anterior branch of the right hepatic artery demonstrates resistive index 0.62 with normal  waveform.    Posterior branch of the right hepatic artery demonstrates resistive index 0.69 with normal waveform.                               X-Ray Chest 1 View (Final result)  Result time 11/11/20 07:41:13    Final result by Rip Bangura MD (11/11/20 07:41:13)                 Impression:      No obvious evidence of an acute pulmonary process.      Electronically signed by: Rip Bangura  Date:    11/11/2020  Time:    07:41             Narrative:    EXAMINATION:  XR CHEST 1 VIEW    CLINICAL HISTORY:  Weakness    TECHNIQUE:  Single frontal view of the chest was performed.    COMPARISON:  August 18, 2017    FINDINGS:  Single frontal view of the chest indicates that the lungs are well aerated.  No indication of a consolidative process or pleural effusion.  There is a tiny pulmonary nodule seen just above the right costal diaphragmatic angle which was present previously remains unchanged.  This most likely is a calcifying granuloma..  The heart is normal in size and contour.                                 Medical Decision Making:   History:   Old Medical Records: I decided to obtain old medical records.  Old Records Summarized: other records.       <> Summary of Records: Patient has history of chronic lower back pain.  Followed by pain management and had SI joint injections in the past.  Initial Assessment:   64-year-old male with history of liver transplant 2011, currently on Prograf, presents to ER due to fevers/chills, abdominal pain, and diarrhea starting this morning.  Low-grade fever on arrival, tachycardic.  Sepsis evaluation initiated.  Will give IV fluids and broad-spectrum antibiotics.  Will continue to closely monitor.  Differential Diagnosis:   Differential Diagnosis includes, but is not limited to:  Sepsis, bacteremia, UTI, pneumonia, cellulitis, abscess, indwelling line/catheter infection, cholecystitis, viral URI, gastroenteritis, viral syndrome, sinusitis, otitis media/externa, neoplasm, drug  reaction, serotonin syndrome, intoxication/withdrawal syndrome.    Clinical Tests:   Lab Tests: Ordered and Reviewed  Radiological Study: Reviewed and Ordered  Medical Tests: Ordered and Reviewed  ED Management:  Elevated lactate, improved with IVF.  Labs otherwise reassuring, US without concerning findings.  CT A/P without obvious infectious process.  Due to concerning presentation for underlying infection in setting of immunosuppression, will request observation for monitoring, f/u blood cultures, IVF.  [facility] contacted for admission.    On re-evaluation, the patient's status has improved.  At this time, I believe the patient should be admitted to the hospital for further evaluation and management of SIRS, dehydration.  [facility] service was contacted and the case was discussed.   The consulting physician/team agrees with plan and will admit under their service.   The patient and family were updated with test results, overall impression, and further plan of care. All questions were answered. The patient expressed understanding and agrees with the current plan.                Attending Attestation:         Attending Critical Care:   Critical Care Times:   Direct Patient Care (initial evaluation, reassessments, and time considering the case)................................................................15 minutes.   Additional History from reviewing old medical records or taking additional history from the family, EMS, PCP, etc.......................5 minutes.   Ordering, Reviewing, and Interpreting Diagnostic Studies...............................................................................................................5 minutes.   Documentation..................................................................................................................................................................................10 minutes.   Consultation with other Physicians.  .................................................................................................................................................5 minutes.   Consultation with the patient's family directly relating to the patient's condition, care, and DNR status (when patient unable)......5 minutes.   ==============================================================  · Total Critical Care Time - exclusive of procedural time: 45 minutes.  ==============================================================  Critical care was necessary to treat or prevent imminent or life-threatening deterioration of the following conditions: sepsis.                         Clinical Impression:       ICD-10-CM ICD-9-CM   1. Sepsis, due to unspecified organism, unspecified whether acute organ dysfunction present  A41.9 038.9     995.91   2. Tachycardia  R00.0 785.0   3. Weakness  R53.1 780.79   4. Diarrhea, unspecified type  R19.7 787.91   5. Right upper quadrant abdominal pain  R10.11 789.01   6. Dehydration  E86.0 276.51   7. Fever, unspecified fever cause  R50.9 780.60                          ED Disposition Condition    Observation                             Tevin Arvizu MD  11/12/20 0936

## 2020-11-11 NOTE — H&P
[facility] Medicine  History & Physical    Patient Name: Vick Gonzalez  MRN: [id number]  Admission Date: 11/11/2020  Attending Physician: Velasquez Edwards, *   Primary Care Provider: Emanuel Lopez MD         Patient information was obtained from patient, past medical records and ER records.     Subjective:     Principal Problem:<principal problem not specified>    Chief Complaint:   Chief Complaint   Patient presents with    Chills     Pt presents with c/o chills, body aches, diarrhea and headache that began yesterday. No known sick contacts.        HPI: Mr. Gonzalez is a 63 yo male with a pertinent history of liver transplant (2011) on prograf, Hx Hep C (treated), pancreatitis, chronic pain, drug and alcohol abuse who presented to the ED this morning with c/o chills, body aches, and abdominal pain x 3 hrs PTA. He states he woke up around 3 am and had diarrhea then just didn't feel well. He reports pain to his epigastric area as well as left flank. He also reports pain across his lower back that is worse than usual. He has chronic back pain with a history of back surgery. He denies dysuria, chest pain, SOB, nausea, vomiting. He denies recent known sick contacts. He reports compliance with all of his medications including prograf. He denies complications from his liver transplant since 2011.    In the ED, he had an elevated lactate 2.7. His temp was elevated at 100, tachycardic 120. Elevated SED rate and CRP. CK 1297. He was started on zosyn and given fluids per sepsis protocol. The patient was admitted to [facility] hospital medicine service for further care.       Past Medical History:   Diagnosis Date    Anemia     Anxiety     Chronic pain syndrome 7/13/2011    CKD (chronic kidney disease), stage III     Diabetes mellitus type II, uncontrolled     Discitis of lumbosacral region 1/16/2015    ED (erectile dysfunction)     Encounter for blood transfusion     Genital  "herpes     Gout, arthritis     History of alcohol abuse     History of hepatitis C, s/p successful Rx w/ SVR24 (cure) - 5/2018 8/23/2011    Completed 24wks Epclusa + RBV w/SVR12 - 2/2018  -     History of positive PPD, treatment status unknown     Pulmonary granulomas, negative sputum cultures for AFB and indeterminate quantferon test    History of substance abuse     Hypertension     Hypothyroidism     Liver replaced by transplant 8/23/2011    DATE: 12/16/2013  LIVER BIOPSY : REASON:  hep C staging  PATHOLOGY COMMITTEE NOTE/PLAN: :grade  1 / stage 1        Pancreatitis 2016    Peptic ulcer disease        Past Surgical History:   Procedure Laterality Date    CHOLECYSTECTOMY      INJECTION OF JOINT Right 12/2/2019    Procedure: INJECTION, JOINT SI;  Surgeon: Thu Penn MD;  Location: [facility];  Service: Pain Management;  Laterality: Right;  RT SI JNT INJ    LIVER TRANSPLANT  06/2010    SPINE SURGERY         Review of patient's allergies indicates:  No Known Allergies    No current facility-administered medications on file prior to encounter.      Current Outpatient Medications on File Prior to Encounter   Medication Sig    allopurinoL (ZYLOPRIM) 100 MG tablet TAKE 1 TABLET BY MOUTH TWICE A DAY    BD ULTRA-FINE MENDY PEN NEEDLES 32 gauge x 5/32" Ndle     calcium carbonate-vitamin D3 (CALTRATE 600 + D) 600 mg(1,500mg) -400 unit Chew     dulaglutide (TRULICITY) 1.5 mg/0.5 mL pen injector Inject 1.5 mg (1 syringe) into the skin every 7 days.    gabapentin (NEURONTIN) 800 MG tablet Take 1 tablet (800 mg total) by mouth 3 (three) times daily.    insulin aspart U-100 (NOVOLOG FLEXPEN U-100 INSULIN) 100 unit/mL (3 mL) InPn pen Inject 20 Units into the skin 3 (three) times daily with meals.    insulin detemir U-100 (LEVEMIR FLEXTOUCH) 100 unit/mL (3 mL) SubQ InPn pen Inject 70 Units into the skin every evening. (Patient taking differently: Inject 80 Units into the skin every evening. )    lancets " "Misc 1 each by Misc.(Non-Drug; Combo Route) route 4 (four) times daily before meals and nightly.    levothyroxine (SYNTHROID) 88 MCG tablet Take 1 tablet (88 mcg total) by mouth once daily.    lisinopriL (PRINIVIL,ZESTRIL) 40 MG tablet TAKE 1 TABLET BY MOUTH EVERY DAY    metoprolol succinate (TOPROL-XL) 200 MG 24 hr tablet TAKE 1 TABLET (200 MG TOTAL) BY MOUTH ONCE DAILY.    multivitamin (THERAGRAN) per tablet Take 1 tablet by mouth.    NIFEdipine (ADALAT CC) 60 MG TbSR TAKE 1 TABLET BY MOUTH ONCE DAILY    NOVOFINE PLUS 32 gauge x 1/6" Ndle     rosuvastatin (CRESTOR) 5 MG tablet TAKE 1 TABLET BY MOUTH EVERY DAY    sertraline (ZOLOFT) 100 MG tablet TAKE 1 TABLET (100 MG TOTAL) BY MOUTH ONCE DAILY.    tacrolimus (PROGRAF) 1 MG Cap TAKE 2 CAPSULES (2 MG TOTAL) BY MOUTH IN THE MORNING & TAKE 1 CAPSULE (1 MG TOTAL) IN THE EVENING    traZODone (DESYREL) 50 MG tablet Take 1 tablet (50 mg total) by mouth every evening.    TRUE METRIX GLUCOSE METER Misc TEST 4 (FOUR) TIMES DAILY BEFORE MEALS AND NIGHTLY.    TRUE METRIX GLUCOSE TEST STRIP Strp USE 3 TIMES DAILY TO TEST BLOOD GLUCOSE LEVEL    colchicine 0.6 mg tablet Take 1 tablet (0.6 mg total) by mouth 2 (two) times daily.    diclofenac sodium (VOLTAREN) 1 % Gel Apply 2 g topically 4 (four) times daily as needed.    ondansetron (ZOFRAN-ODT) 4 MG TbDL Take 1 tablet (4 mg total) by mouth every 8 (eight) hours as needed (n/v).    pantoprazole (PROTONIX) 20 MG tablet Take 1 tablet (20 mg total) by mouth once daily.    pen needle, diabetic (BD ULTRA-FINE MENDY PEN NEEDLE) 32 gauge x 5/32" Ndle TEST WITH NOVOLOG THREE TIMES A DAY WITH MEALS AND WITH LEVEMIR AT BEDTIME    [DISCONTINUED] amitriptyline (ELAVIL) 50 MG tablet Take 1 tablet (50 mg total) by mouth every evening. For nerve pain and sleep    [DISCONTINUED] amoxicillin (AMOXIL) 500 MG capsule Take 500 mg by mouth 3 (three) times daily.    [DISCONTINUED] NIFEdipine (ADALAT CC) 60 MG TbSR TAKE 1 TABLET (60 " MG TOTAL) BY MOUTH ONCE DAILY.    [DISCONTINUED] rosuvastatin (CRESTOR) 5 MG tablet Take 1 tablet (5 mg total) by mouth once daily.     Family History     Problem Relation (Age of Onset)    Cancer Mother, Maternal Uncle (82)    Diabetes Mother, Sister    Heart disease Mother        Tobacco Use    Smoking status: Former Smoker    Smokeless tobacco: Never Used   Substance and Sexual Activity    Alcohol use: No     Comment: over 5 years ago, none currently    Drug use: Not Currently     Comment: Former cocaine use    Sexual activity: Not on file     Review of Systems   Constitutional: Positive for chills and fever. Negative for appetite change.   HENT: Negative for congestion, sore throat and trouble swallowing.    Eyes: Negative for pain, redness and visual disturbance.   Respiratory: Negative for cough and shortness of breath.    Cardiovascular: Negative for chest pain and palpitations.   Gastrointestinal: Positive for abdominal pain and diarrhea. Negative for blood in stool, constipation, nausea and vomiting.        Left flank pain   Genitourinary: Negative for difficulty urinating, dysuria and hematuria.   Musculoskeletal: Positive for back pain.   Skin: Negative for rash and wound.   Neurological: Positive for headaches. Negative for dizziness, weakness, light-headedness and numbness.   Psychiatric/Behavioral: Negative for confusion.     Objective:     Vital Signs (Most Recent):  Temp: 100 °F (37.8 °C) (11/11/20 0523)  Pulse: 74 (11/11/20 1200)  Resp: 20 (11/11/20 1200)  BP: 116/74 (11/11/20 1200)  SpO2: 97 % (11/11/20 1200) Vital Signs (24h Range):  Temp:  [100 °F (37.8 °C)] 100 °F (37.8 °C)  Pulse:  [] 74  Resp:  [12-22] 20  SpO2:  [97 %-100 %] 97 %  BP: (106-174)/(60-91) 116/74     Weight: 108.9 kg (240 lb)  Body mass index is 31.66 kg/m².    Physical Exam  Vitals signs and nursing note reviewed.   Constitutional:       General: He is not in acute distress.     Appearance: He is obese.   HENT:       Head: Normocephalic and atraumatic.      Mouth/Throat:      Mouth: Mucous membranes are moist.   Eyes:      Conjunctiva/sclera: Conjunctivae normal.   Neck:      Musculoskeletal: Neck supple.   Cardiovascular:      Rate and Rhythm: Normal rate and regular rhythm.      Pulses: Normal pulses.   Pulmonary:      Effort: Pulmonary effort is normal.      Breath sounds: Normal breath sounds.   Abdominal:      General: Bowel sounds are normal. There is no distension.      Palpations: Abdomen is soft.      Tenderness: There is abdominal tenderness.      Comments: obese   Musculoskeletal:      Right lower leg: Edema present.      Left lower leg: Edema present.   Skin:     General: Skin is warm and dry.   Neurological:      Mental Status: He is alert and oriented to person, place, and time.   Psychiatric:         Mood and Affect: Mood normal.         Behavior: Behavior normal.         Thought Content: Thought content normal.             Significant Labs:   A1C:   Recent Labs   Lab 09/01/20  0829   HGBA1C 11.9*     Blood Culture: No results for input(s): LABBLOO in the last 48 hours.  CBC:   Recent Labs   Lab 11/11/20  0551   WBC 8.39   HGB 13.1*   HCT 39.2*   *     CMP:   Recent Labs   Lab 11/11/20  0551      K 4.2      CO2 22*   *   BUN 16   CREATININE 1.4   CALCIUM 10.1   PROT 7.6   ALBUMIN 4.2   BILITOT 0.6   ALKPHOS 93   AST 90*   ALT 70*   ANIONGAP 13   EGFRNONAA 53*     Lactic Acid:   Recent Labs   Lab 11/11/20  0551 11/11/20  0907   LACTATE 2.7* 2.3*     Magnesium:   Recent Labs   Lab 11/11/20  0551   MG 1.1*     POCT Glucose:   Recent Labs   Lab 11/11/20  0827   POCTGLUCOSE 214*     Troponin:   Recent Labs   Lab 11/11/20  0551   TROPONINI 0.007     TSH:   Recent Labs   Lab 11/11/20  0551   TSH 1.046     Urine Studies:   Recent Labs   Lab 11/11/20  0945   COLORU Yellow   APPEARANCEUA Clear   PHUR 5.0   SPECGRAV 1.015   PROTEINUA 1+*   GLUCUA Trace*   KETONESU 1+*   BILIRUBINUA Negative    OCCULTUA Trace*   NITRITE Negative   UROBILINOGEN Negative   LEUKOCYTESUR Negative   RBCUA 0   WBCUA 1   BACTERIA None   SQUAMEPITHEL 2   HYALINECASTS 0       Significant Imaging: I have reviewed all pertinent imaging results/findings within the past 24 hours.    Assessment/Plan:     Transaminitis  Mild elevation  Will monitor   Cont IV fluids  Tylenol level pending  Hepatitis panel pending-history of Hep C - was treated      Sepsis  Unknown source-likely viral etiology but will treat due to immunocompromised state  CT abd no acute findings, UA negative, chest Xray negative, BC pending  Cont zosyn  Cont IV fluids  Lactate trending down   Monitor temps, tylenol for fever      BPH with urinary obstruction  Not on any home meds for this  Denies issues with urination      PAD (peripheral artery disease)            CKD (chronic kidney disease), stage III    Creat 1.4 on admit, better than it has been on prior admits  Monitor      S/P liver transplant  Reports compliance with prograf  Mild transaminitis, will trend      Diabetes mellitus type II, uncontrolled  Glucose 200s  Check A1C  Start detemir nightly  Prandial insulin and SSI with accuchecks  Diabetic diet      Essential hypertension  BP elevated on admit  Has been controlled since arrival  Cont home nifedipine and metoprolol  Control pain      Gout, unspecified  Cont allopurinol      Hypothyroidism  Cont symthroid      Chronic pain syndrome  Lumbar radiculopathy    Reports back pain is worse than normal  Morphine given in ED  Cont neurontin  Start dialudid PRN  Hold stool softerner for now due to c/o diarrhea        VTE Risk Mitigation (From admission, onward)         Ordered     enoxaparin injection 40 mg  Every 24 hours      11/11/20 1248     IP VTE HIGH RISK PATIENT  Once      11/11/20 1248     Place sequential compression device  Until discontinued      11/11/20 1248                   Kamini Cueva NP  Department of Hospital Medicine   [facility]

## 2020-11-11 NOTE — HPI
Mr. Gonzalez is a 63 yo male with a pertinent history of liver transplant (2011) on prograf, Hx Hep C (treated), pancreatitis, chronic pain, drug and alcohol abuse who presented to the ED this morning with c/o chills, body aches, and abdominal pain x 3 hrs PTA. He states he woke up around 3 am and had diarrhea then just didn't feel well. He reports pain to his epigastric area as well as left flank. He also reports pain across his lower back that is worse than usual. He has chronic back pain with a history of back surgery. He denies dysuria, chest pain, SOB, nausea, vomiting. He denies recent known sick contacts. He reports compliance with all of his medications including prograf. He denies complications from his liver transplant since 2011.    In the ED, he had an elevated lactate 2.7. His temp was elevated at 100, tachycardic 120. Elevated SED rate and CRP. CK 1297. He was started on zosyn and given fluids per sepsis protocol. The patient was admitted to [facility] hospital medicine service for further care.

## 2020-11-11 NOTE — MEDICAL/APP STUDENT
Progress Note  Hospital Medicine    Primary Team: Networked reference to record PCT   Admit Date: 11/11/2020   Length of Stay:  LOS: 0 days   SUBJECTIVE:   Reason for Admission:  <principal problem not specified>    HPI:   Mr. Gonzalez is a 64 year old male with a past medical history of liver transplant, CKD, DM2, alcohol abuse, HTN, hypothyroid, and pancreatitis who presents with fever,abdominal pain, and diarrhea. Patient states that his symptoms began at 3AM, when he suddenly started feeling feverish with chills. He also quickly developed epigastric, RLQ, and left flank pain along with diarrhea.Denies any blood in his stool, dysuria, CP, SOB, or any other symptoms.     He is currently on Prograf for his liver transplant.     ED course:   Patient started on broad spectrum ABX and fluids.   Septic workup initiated     Review of Systems:  Review of Systems   Constitutional: Positive for chills, diaphoresis and fever.   HENT: Negative.    Eyes: Negative.    Respiratory: Negative.    Cardiovascular: Negative.    Gastrointestinal: Positive for abdominal pain and diarrhea. Negative for blood in stool, constipation, melena, nausea and vomiting.   Genitourinary: Positive for flank pain. Negative for hematuria.   Skin: Negative for itching and rash.   Neurological: Negative.    Endo/Heme/Allergies: Negative.    Psychiatric/Behavioral: Negative.          Past Medical History:   Diagnosis Date    Anemia     Anxiety     Chronic pain syndrome 7/13/2011    CKD (chronic kidney disease), stage III     Diabetes mellitus type II, uncontrolled     Discitis of lumbosacral region 1/16/2015    ED (erectile dysfunction)     Encounter for blood transfusion     Genital herpes     Gout, arthritis     History of alcohol abuse     History of hepatitis C, s/p successful Rx w/ SVR24 (cure) - 5/2018 8/23/2011    Completed 24wks Epclusa + RBV w/SVR12 - 2/2018  -     History of positive PPD, treatment status unknown     Pulmonary  granulomas, negative sputum cultures for AFB and indeterminate quantferon test    History of substance abuse     Hypertension     Hypothyroidism     Liver replaced by transplant 8/23/2011    DATE: 12/16/2013  LIVER BIOPSY : REASON:  hep C staging  PATHOLOGY COMMITTEE NOTE/PLAN: :grade  1 / stage 1        Pancreatitis 2016    Peptic ulcer disease      .amb  Past Surgical History:   Procedure Laterality Date    CHOLECYSTECTOMY      INJECTION OF JOINT Right 12/2/2019    Procedure: INJECTION, JOINT SI;  Surgeon: Thu Penn MD;  [facility];  Service: Pain Management;  Laterality: Right;  RT SI JNT INJ    LIVER TRANSPLANT  06/2010    SPINE SURGERY       Review of patient's allergies indicates:  No Known Allergies  Family History   Problem Relation Age of Onset    Cancer Mother     Diabetes Mother     Heart disease Mother     Diabetes Sister     Cancer Maternal Uncle 82        colon CA    Melanoma Neg Hx     Psoriasis Neg Hx     Lupus Neg Hx     Eczema Neg Hx     Acne Neg Hx      Relationships   Social connections    Talks on phone: Not on file    Gets together: Not on file    Attends Confucianist service: Not on file    Active member of club or organization: Not on file    Attends meetings of clubs or organizations: Not on file    Relationship status: Not on file         OBJECTIVE:     Temp:  [100 °F]   Pulse:  []   Resp:  [12-22]   BP: (106-174)/(60-91)   SpO2:  [97 %-100 %]  Body mass index is 31.66 kg/m².  Intake/Outake:  This Shift:  No intake/output data recorded.    Net I/O past 24h:     Intake/Output Summary (Last 24 hours) at 11/11/2020 1117  Last data filed at 11/11/2020 0659  Gross per 24 hour   Intake 100 ml   Output --   Net 100 ml             Physical Exam:  Physical Exam   Constitutional: He is oriented to person, place, and time and well-developed, well-nourished, and in no distress.   HENT:   Head: Normocephalic.   Eyes: Pupils are equal, round, and reactive to  light. Conjunctivae are normal.   Neck: Normal range of motion. Neck supple.   Cardiovascular: Normal rate, regular rhythm and normal heart sounds.   Pulmonary/Chest: Effort normal and breath sounds normal.   Abdominal: Soft. Bowel sounds are normal. He exhibits distension. There is abdominal tenderness. There is rebound.   Rebound tenderness in RLQ   Patient states he is distended more than usual      Musculoskeletal: Normal range of motion.   Neurological: He is alert and oriented to person, place, and time.   Skin: Skin is warm and dry.        Laboratory:  CBC/Anemia Labs: Coags:    Recent Labs   Lab 11/11/20  0551   WBC 8.39   HGB 13.1*   HCT 39.2*   *   MCV 87   RDW 13.8    No results for input(s): PT, INR, APTT in the last 168 hours.     Chemistries:   Recent Labs   Lab 11/11/20  0551      K 4.2      CO2 22*   BUN 16   CREATININE 1.4   CALCIUM 10.1   PROT 7.6   BILITOT 0.6   ALKPHOS 93   ALT 70*   AST 90*   MG 1.1*   PHOS 2.6*        Medications:  Scheduled Meds:                          Continuous Infusions:  PRN Meds:.     ASSESSMENT/PLAN:     Active Hospital Problems    Diagnosis  POA    Sepsis [A41.9]  Yes      Resolved Hospital Problems   No resolved problems to display.         VTE Risk Mitigation (From admission, onward)    None         Sepsis  Unclear source of infection at this time,   UA -  CXR -  CT abdo showed no acute changes with some mild cellulitis of anterior abdominal wall and hepatic steatosis + mild hepatomegaly. No ascites present. No inflammation of small/large bowel or appendix.   -Continue broad spectrum antibiotics + IV fluids   -Trend CB   -Follow up on blood cultures     Diarrhea   -Stool C-Diff  -Stool OPA  -Continue fluids     Hepatitis  Patient in immunocompromised state, currently taking tacrolimus. Moderate elevation of LFTs.   US Doppler of liver within normal limits  -Viral panel         Ho Brandan, MS3

## 2020-11-12 PROBLEM — R10.31 RIGHT LOWER QUADRANT ABDOMINAL PAIN: Status: ACTIVE | Noted: 2020-11-12

## 2020-11-12 LAB
ALBUMIN SERPL BCP-MCNC: 3.5 G/DL (ref 3.5–5.2)
ALP SERPL-CCNC: 67 U/L (ref 55–135)
ALT SERPL W/O P-5'-P-CCNC: 100 U/L (ref 10–44)
ANION GAP SERPL CALC-SCNC: 9 MMOL/L (ref 8–16)
AST SERPL-CCNC: 73 U/L (ref 10–40)
BASOPHILS # BLD AUTO: 0.02 K/UL (ref 0–0.2)
BASOPHILS NFR BLD: 0.3 % (ref 0–1.9)
BILIRUB SERPL-MCNC: 0.5 MG/DL (ref 0.1–1)
BUN SERPL-MCNC: 15 MG/DL (ref 8–23)
CALCIUM SERPL-MCNC: 9.1 MG/DL (ref 8.7–10.5)
CHLORIDE SERPL-SCNC: 104 MMOL/L (ref 95–110)
CO2 SERPL-SCNC: 24 MMOL/L (ref 23–29)
CREAT SERPL-MCNC: 1.3 MG/DL (ref 0.5–1.4)
DIFFERENTIAL METHOD: ABNORMAL
EOSINOPHIL # BLD AUTO: 0.5 K/UL (ref 0–0.5)
EOSINOPHIL NFR BLD: 8 % (ref 0–8)
ERYTHROCYTE [DISTWIDTH] IN BLOOD BY AUTOMATED COUNT: 14.1 % (ref 11.5–14.5)
EST. GFR  (AFRICAN AMERICAN): >60 ML/MIN/1.73 M^2
EST. GFR  (NON AFRICAN AMERICAN): 58 ML/MIN/1.73 M^2
GLUCOSE SERPL-MCNC: 217 MG/DL (ref 70–110)
HCT VFR BLD AUTO: 36.5 % (ref 40–54)
HGB BLD-MCNC: 11.9 G/DL (ref 14–18)
IMM GRANULOCYTES # BLD AUTO: 0.02 K/UL (ref 0–0.04)
IMM GRANULOCYTES NFR BLD AUTO: 0.3 % (ref 0–0.5)
LYMPHOCYTES # BLD AUTO: 2 K/UL (ref 1–4.8)
LYMPHOCYTES NFR BLD: 32.7 % (ref 18–48)
MAGNESIUM SERPL-MCNC: 1.4 MG/DL (ref 1.6–2.6)
MCH RBC QN AUTO: 28.5 PG (ref 27–31)
MCHC RBC AUTO-ENTMCNC: 32.6 G/DL (ref 32–36)
MCV RBC AUTO: 88 FL (ref 82–98)
MONOCYTES # BLD AUTO: 0.6 K/UL (ref 0.3–1)
MONOCYTES NFR BLD: 9.9 % (ref 4–15)
NEUTROPHILS # BLD AUTO: 3 K/UL (ref 1.8–7.7)
NEUTROPHILS NFR BLD: 48.8 % (ref 38–73)
NRBC BLD-RTO: 0 /100 WBC
PLATELET # BLD AUTO: 132 K/UL (ref 150–350)
PMV BLD AUTO: 12.2 FL (ref 9.2–12.9)
POCT GLUCOSE: 145 MG/DL (ref 70–110)
POCT GLUCOSE: 177 MG/DL (ref 70–110)
POCT GLUCOSE: 181 MG/DL (ref 70–110)
POCT GLUCOSE: 222 MG/DL (ref 70–110)
POTASSIUM SERPL-SCNC: 4.1 MMOL/L (ref 3.5–5.1)
PROT SERPL-MCNC: 6.8 G/DL (ref 6–8.4)
RBC # BLD AUTO: 4.17 M/UL (ref 4.6–6.2)
SODIUM SERPL-SCNC: 137 MMOL/L (ref 136–145)
WBC # BLD AUTO: 6.24 K/UL (ref 3.9–12.7)

## 2020-11-12 PROCEDURE — 63600175 PHARM REV CODE 636 W HCPCS: Performed by: NURSE PRACTITIONER

## 2020-11-12 PROCEDURE — 85025 COMPLETE CBC W/AUTO DIFF WBC: CPT

## 2020-11-12 PROCEDURE — 96375 TX/PRO/DX INJ NEW DRUG ADDON: CPT

## 2020-11-12 PROCEDURE — 96372 THER/PROPH/DIAG INJ SC/IM: CPT

## 2020-11-12 PROCEDURE — 25000003 PHARM REV CODE 250: Performed by: NURSE PRACTITIONER

## 2020-11-12 PROCEDURE — 83735 ASSAY OF MAGNESIUM: CPT

## 2020-11-12 PROCEDURE — 96376 TX/PRO/DX INJ SAME DRUG ADON: CPT

## 2020-11-12 PROCEDURE — 36415 COLL VENOUS BLD VENIPUNCTURE: CPT

## 2020-11-12 PROCEDURE — 99204 OFFICE O/P NEW MOD 45 MIN: CPT | Mod: ,,, | Performed by: INTERNAL MEDICINE

## 2020-11-12 PROCEDURE — 63600175 PHARM REV CODE 636 W HCPCS: Performed by: HOSPITALIST

## 2020-11-12 PROCEDURE — 80053 COMPREHEN METABOLIC PANEL: CPT

## 2020-11-12 PROCEDURE — 99204 PR OFFICE/OUTPT VISIT, NEW, LEVL IV, 45-59 MIN: ICD-10-PCS | Mod: ,,, | Performed by: INTERNAL MEDICINE

## 2020-11-12 PROCEDURE — 80197 ASSAY OF TACROLIMUS: CPT

## 2020-11-12 PROCEDURE — G0378 HOSPITAL OBSERVATION PER HR: HCPCS

## 2020-11-12 RX ORDER — OXYCODONE HYDROCHLORIDE 5 MG/1
5 TABLET ORAL EVERY 4 HOURS PRN
Status: DISCONTINUED | OUTPATIENT
Start: 2020-11-12 | End: 2020-11-13 | Stop reason: HOSPADM

## 2020-11-12 RX ORDER — LIDOCAINE 50 MG/G
1 PATCH TOPICAL
Status: DISCONTINUED | OUTPATIENT
Start: 2020-11-12 | End: 2020-11-13 | Stop reason: HOSPADM

## 2020-11-12 RX ORDER — MAGNESIUM SULFATE HEPTAHYDRATE 40 MG/ML
2 INJECTION, SOLUTION INTRAVENOUS ONCE
Status: COMPLETED | OUTPATIENT
Start: 2020-11-12 | End: 2020-11-12

## 2020-11-12 RX ORDER — OXYCODONE HYDROCHLORIDE 5 MG/1
5 TABLET ORAL EVERY 6 HOURS PRN
Status: DISCONTINUED | OUTPATIENT
Start: 2020-11-12 | End: 2020-11-12

## 2020-11-12 RX ADMIN — CALCIUM CARBONATE (ANTACID) CHEW TAB 500 MG 1000 MG: 500 CHEW TAB at 08:11

## 2020-11-12 RX ADMIN — INSULIN ASPART 8 UNITS: 100 INJECTION, SOLUTION INTRAVENOUS; SUBCUTANEOUS at 04:11

## 2020-11-12 RX ADMIN — CHOLECALCIFEROL TAB 10 MCG (400 UNIT) 400 UNITS: 10 TAB at 08:11

## 2020-11-12 RX ADMIN — THERA TABS 1 TABLET: TAB at 08:11

## 2020-11-12 RX ADMIN — SERTRALINE HYDROCHLORIDE 100 MG: 100 TABLET ORAL at 08:11

## 2020-11-12 RX ADMIN — TRAZODONE HYDROCHLORIDE 50 MG: 50 TABLET ORAL at 09:11

## 2020-11-12 RX ADMIN — INSULIN ASPART 1 UNITS: 100 INJECTION, SOLUTION INTRAVENOUS; SUBCUTANEOUS at 09:11

## 2020-11-12 RX ADMIN — NIFEDIPINE 60 MG: 30 TABLET, FILM COATED, EXTENDED RELEASE ORAL at 08:11

## 2020-11-12 RX ADMIN — OXYCODONE HYDROCHLORIDE 5 MG: 5 TABLET ORAL at 06:11

## 2020-11-12 RX ADMIN — OXYCODONE HYDROCHLORIDE 5 MG: 5 TABLET ORAL at 02:11

## 2020-11-12 RX ADMIN — TACROLIMUS 2 MG: 1 CAPSULE ORAL at 08:11

## 2020-11-12 RX ADMIN — LIDOCAINE 1 PATCH: 50 PATCH TOPICAL at 08:11

## 2020-11-12 RX ADMIN — METOPROLOL SUCCINATE 200 MG: 50 TABLET, EXTENDED RELEASE ORAL at 08:11

## 2020-11-12 RX ADMIN — CALCIUM CARBONATE (ANTACID) CHEW TAB 500 MG 1000 MG: 500 CHEW TAB at 09:11

## 2020-11-12 RX ADMIN — PIPERACILLIN AND TAZOBACTAM 4.5 G: 4; .5 INJECTION, POWDER, LYOPHILIZED, FOR SOLUTION INTRAVENOUS; PARENTERAL at 02:11

## 2020-11-12 RX ADMIN — PIPERACILLIN AND TAZOBACTAM 4.5 G: 4; .5 INJECTION, POWDER, LYOPHILIZED, FOR SOLUTION INTRAVENOUS; PARENTERAL at 06:11

## 2020-11-12 RX ADMIN — INSULIN ASPART 8 UNITS: 100 INJECTION, SOLUTION INTRAVENOUS; SUBCUTANEOUS at 11:11

## 2020-11-12 RX ADMIN — OXYCODONE HYDROCHLORIDE 5 MG: 5 TABLET ORAL at 10:11

## 2020-11-12 RX ADMIN — GABAPENTIN 800 MG: 400 CAPSULE ORAL at 02:11

## 2020-11-12 RX ADMIN — MAGNESIUM SULFATE IN WATER 2 G: 40 INJECTION, SOLUTION INTRAVENOUS at 08:11

## 2020-11-12 RX ADMIN — GABAPENTIN 800 MG: 400 CAPSULE ORAL at 08:11

## 2020-11-12 RX ADMIN — INSULIN ASPART 8 UNITS: 100 INJECTION, SOLUTION INTRAVENOUS; SUBCUTANEOUS at 08:11

## 2020-11-12 RX ADMIN — HYDROMORPHONE HYDROCHLORIDE 1 MG: 1 INJECTION, SOLUTION INTRAMUSCULAR; INTRAVENOUS; SUBCUTANEOUS at 05:11

## 2020-11-12 RX ADMIN — GABAPENTIN 800 MG: 400 CAPSULE ORAL at 09:11

## 2020-11-12 RX ADMIN — LEVOTHYROXINE SODIUM 88 MCG: 88 TABLET ORAL at 05:11

## 2020-11-12 RX ADMIN — ROSUVASTATIN CALCIUM 5 MG: 5 TABLET, COATED ORAL at 08:11

## 2020-11-12 RX ADMIN — PIPERACILLIN AND TAZOBACTAM 4.5 G: 4; .5 INJECTION, POWDER, LYOPHILIZED, FOR SOLUTION INTRAVENOUS; PARENTERAL at 10:11

## 2020-11-12 RX ADMIN — ENOXAPARIN SODIUM 40 MG: 40 INJECTION SUBCUTANEOUS at 04:11

## 2020-11-12 RX ADMIN — TACROLIMUS 1 MG: 1 CAPSULE ORAL at 06:11

## 2020-11-12 RX ADMIN — LISINOPRIL 40 MG: 10 TABLET ORAL at 08:11

## 2020-11-12 NOTE — HPI
This is a  65 yo male with a PMHx of HCV cirrhosis s/p OLT in 2011 on prograf,  chronic pain, drug/etoh abuse who presentedc/o chills, body aches, and abdominal pain x 3 hrs associated with generalized malaise. + one episode of diarrhea and crampy epigastric pain.  He also reports pain across his lower back, though he has this chronically. No recent falls or know trauma. No recent travel, sick contacts or abx usage. He is s/p remote cholecystectomy as well.     The following portions of the patient's history were reviewed and updated as appropriate: allergies, current medications, past family history, past medical history, past social history, past surgical history and problem list.

## 2020-11-12 NOTE — PLAN OF CARE
Pt vitals were maintained, pt c/o of pain and meds were given. Pt tolerated IV antibiotics well. Pt voided through shift. Fluids maintianed. Pt BG was not in range and insulin was given. Pt ambulates with minimal asst to bathroom. No wounds noticed. Pt Aaox4. No fever, WCTM

## 2020-11-12 NOTE — ASSESSMENT & PLAN NOTE
- mild elevation in hepatocellular pattern, may be related to sepsis  - LFTs in am  - f/u prograf level

## 2020-11-12 NOTE — PLAN OF CARE
TN met with patient at bedside to complete discharge assessment. Currently, patient live with his SO Cely [phone] and states that he's  independent with ADL's. Only DME noted is a cane. No other DME or HH noted. Per patient, upon discharge his family will provide transportation home and be available to help as needed. Patient's PCP is Dr. Lopez.      TN updated whiteboard with contact information. Discharge brochure given to patient. TN will continue to follow throughout transitions of care and will assist with discharge needs.           11/12/20 1346   Discharge Assessment   Assessment Type Discharge Planning Assessment   Confirmed/corrected address and phone number on facesheet? Yes   Assessment information obtained from? Patient;Medical Record   Expected Length of Stay (days) 3   Prior to hospitilization cognitive status: Alert/Oriented   Prior to hospitalization functional status: Assistive Equipment   Current cognitive status: Alert/Oriented   Current Functional Status: Needs Assistance   Lives With significant other   Able to Return to Prior Arrangements yes   Is patient able to care for self after discharge? Yes   Patient's perception of discharge disposition home or selfcare   Readmission Within the Last 30 Days no previous admission in last 30 days   Patient currently being followed by outpatient case management? No   Patient currently receives home health services? No   Patient currently receives any other outside agency services? No   Equipment Currently Used at Home cane, straight   Do you have any problems affording any of your prescribed medications? No   Is the patient taking medications as prescribed? yes   Does the patient have transportation home? Yes   Transportation Anticipated family or friend will provide   Does the patient receive services at the [facility]? No   Discharge Plan A Home   Discharge Plan B Home with family   DME Needed Upon Discharge  none   Patient/Family in  Agreement with Plan yes   Does the patient have family/friends to help with healtcare needs after discharge? yes   Does the patient currently use HME? Yes   Does the patient have transportation to healthcare appointments? Yes   On Dialysis? No   Does the patient receive outpatient dialysis? No   Are there any open cases? No   Readmission Questionnaire   Living Arrangements house   Have you felt down, depressed, or hopeless? 0   Have you felt little interest or pleasure in doing things? 0

## 2020-11-12 NOTE — SUBJECTIVE & OBJECTIVE
Past Medical History:   Diagnosis Date    Anemia     Anxiety     Chronic pain syndrome 7/13/2011    CKD (chronic kidney disease), stage III     Diabetes mellitus type II, uncontrolled     Discitis of lumbosacral region 1/16/2015    ED (erectile dysfunction)     Encounter for blood transfusion     Genital herpes     Gout, arthritis     History of alcohol abuse     History of hepatitis C, s/p successful Rx w/ SVR24 (cure) - 5/2018 8/23/2011    Completed 24wks Epclusa + RBV w/SVR12 - 2/2018  -     History of positive PPD, treatment status unknown     Pulmonary granulomas, negative sputum cultures for AFB and indeterminate quantferon test    History of substance abuse     Hypertension     Hypothyroidism     Liver replaced by transplant 8/23/2011    DATE: 12/16/2013  LIVER BIOPSY : REASON:  hep C staging  PATHOLOGY COMMITTEE NOTE/PLAN: :grade  1 / stage 1        Pancreatitis 2016    Peptic ulcer disease        Past Surgical History:   Procedure Laterality Date    CHOLECYSTECTOMY      INJECTION OF JOINT Right 12/2/2019    Procedure: INJECTION, JOINT SI;  Surgeon: Thu Penn MD;  Location: [facility];  Service: Pain Management;  Laterality: Right;  RT SI JNT INJ    LIVER TRANSPLANT  06/2010    SPINE SURGERY         Review of patient's allergies indicates:  No Known Allergies  Family History     Problem Relation (Age of Onset)    Cancer Mother, Maternal Uncle (82)    Diabetes Mother, Sister    Heart disease Mother        Tobacco Use    Smoking status: Former Smoker    Smokeless tobacco: Never Used   Substance and Sexual Activity    Alcohol use: No     Comment: over 5 years ago, none currently    Drug use: Not Currently     Comment: Former cocaine use    Sexual activity: Yes     Review of Systems   Constitutional: Positive for appetite change, chills and fatigue. Negative for fever.   HENT: Negative for postnasal drip and trouble swallowing.    Eyes: Negative for pain and visual disturbance.    Respiratory: Negative for choking and shortness of breath.    Cardiovascular: Negative for chest pain and leg swelling.   Gastrointestinal: Positive for abdominal pain and diarrhea. Negative for anal bleeding, blood in stool, nausea, rectal pain and vomiting.   Endocrine: Negative for cold intolerance and heat intolerance.   Genitourinary: Negative for difficulty urinating and hematuria.   Musculoskeletal: Positive for arthralgias and back pain.   Neurological: Negative for dizziness and numbness.   Hematological: Negative for adenopathy. Does not bruise/bleed easily.   Psychiatric/Behavioral: Negative for agitation and confusion.     Objective:     Vital Signs (Most Recent):  Temp: 98.1 °F (36.7 °C) (11/12/20 0819)  Pulse: 79 (11/12/20 0819)  Resp: 18 (11/12/20 1016)  BP: 134/80 (11/12/20 0819)  SpO2: 100 % (11/11/20 2159) Vital Signs (24h Range):  Temp:  [96.6 °F (35.9 °C)-98.1 °F (36.7 °C)] 98.1 °F (36.7 °C)  Pulse:  [74-89] 79  Resp:  [12-20] 18  SpO2:  [97 %-100 %] 100 %  BP: (116-156)/(66-92) 134/80     Weight: 121.4 kg (267 lb 10.2 oz) (11/12/20 0001)  Body mass index is 35.31 kg/m².      Intake/Output Summary (Last 24 hours) at 11/12/2020 1045  Last data filed at 11/12/2020 0900  Gross per 24 hour   Intake 1829.67 ml   Output 2250 ml   Net -420.33 ml       Lines/Drains/Airways     Peripheral Intravenous Line                 Peripheral IV - Single Lumen 11/11/20 0556 20 G Right Hand 1 day         Peripheral IV - Single Lumen 11/11/20 0601 20 G Left Forearm 1 day                Physical Exam  Vitals signs and nursing note reviewed.   Constitutional:       General: He is not in acute distress.     Appearance: He is well-developed. He is not diaphoretic.   HENT:      Head: Normocephalic.   Eyes:      General: No scleral icterus.     Conjunctiva/sclera: Conjunctivae normal.   Neck:      Thyroid: No thyromegaly.      Trachea: No tracheal deviation.   Cardiovascular:      Rate and Rhythm: Normal rate and regular  rhythm.      Heart sounds: No friction rub. No gallop.    Pulmonary:      Effort: Pulmonary effort is normal. No respiratory distress.      Breath sounds: Normal breath sounds. No rales.   Abdominal:      General: Bowel sounds are normal. There is no distension.      Palpations: Abdomen is soft.      Tenderness: There is no abdominal tenderness. There is no guarding or rebound.   Musculoskeletal: Normal range of motion.         General: No tenderness.   Neurological:      General: No focal deficit present.      Mental Status: He is alert and oriented to person, place, and time.   Psychiatric:         Behavior: Behavior normal.         Significant Labs:  CBC:   Recent Labs   Lab 11/11/20  0551 11/12/20  0503   WBC 8.39 6.24   HGB 13.1* 11.9*   HCT 39.2* 36.5*   * 132*     CMP:   Recent Labs   Lab 11/12/20  0503   *   CALCIUM 9.1   ALBUMIN 3.5   PROT 6.8      K 4.1   CO2 24      BUN 15   CREATININE 1.3   ALKPHOS 67   *   AST 73*   BILITOT 0.5       Significant Imaging:  Imaging results within the past 24 hours have been reviewed.

## 2020-11-12 NOTE — PLAN OF CARE
Pt awake and alert. Complaints of right lower abdomen pain and lower back pain with relief from Oxy IR. Blood sugar checked with scheduled insulin given. Pt with Solid BM; C. Diff canceled due to not having a BM for 24 hours. Pt ambulating in room. IV antibiotic per orders.

## 2020-11-12 NOTE — CONSULTS
[facility]  Gastroenterology  Consult Note    Patient Name: Vick Gonzalez  MRN: [id number]  Admission Date: 11/11/2020  Hospital Length of Stay: 0 days  Code Status: Full Code   Attending Provider: Velasquez Edwards, *   Consulting Provider: Carley Hicks MD  Primary Care Physician: Emanuel Lopez MD  Principal Problem:<principal problem not specified>    Inpatient consult to [facility]  Consult performed by: Carley Hicks MD  Consult ordered by: Velasquez Edwards MD  Reason for consult: Elevated LFTs, h/o OLT        Subjective:     HPI:  This is a  63 yo male with a PMHx of HCV cirrhosis s/p OLT in 2011 on prograf,  chronic pain, drug/etoh abuse who presentedc/o chills, body aches, and abdominal pain x 3 hrs associated with generalized malaise. + one episode of diarrhea and crampy epigastric pain.  He also reports pain across his lower back, though he has this chronically. No recent falls or know trauma. No recent travel, sick contacts or abx usage. He is s/p remote cholecystectomy as well.     The following portions of the patient's history were reviewed and updated as appropriate: allergies, current medications, past family history, past medical history, past social history, past surgical history and problem list.      Past Medical History:   Diagnosis Date    Anemia     Anxiety     Chronic pain syndrome 7/13/2011    CKD (chronic kidney disease), stage III     Diabetes mellitus type II, uncontrolled     Discitis of lumbosacral region 1/16/2015    ED (erectile dysfunction)     Encounter for blood transfusion     Genital herpes     Gout, arthritis     History of alcohol abuse     History of hepatitis C, s/p successful Rx w/ SVR24 (cure) - 5/2018 8/23/2011    Completed 24wks Epclusa + RBV w/SVR12 - 2/2018  -     History of positive PPD, treatment status unknown     Pulmonary granulomas, negative sputum cultures for AFB and indeterminate quantferon test     History of substance abuse     Hypertension     Hypothyroidism     Liver replaced by transplant 8/23/2011    DATE: 12/16/2013  LIVER BIOPSY : REASON:  hep C staging  PATHOLOGY COMMITTEE NOTE/PLAN: :grade  1 / stage 1        Pancreatitis 2016    Peptic ulcer disease        Past Surgical History:   Procedure Laterality Date    CHOLECYSTECTOMY      INJECTION OF JOINT Right 12/2/2019    Procedure: INJECTION, JOINT SI;  Surgeon: Thu Penn MD;  Location: [facility];  Service: Pain Management;  Laterality: Right;  RT SI JNT INJ    LIVER TRANSPLANT  06/2010    SPINE SURGERY         Review of patient's allergies indicates:  No Known Allergies  Family History     Problem Relation (Age of Onset)    Cancer Mother, Maternal Uncle (82)    Diabetes Mother, Sister    Heart disease Mother        Tobacco Use    Smoking status: Former Smoker    Smokeless tobacco: Never Used   Substance and Sexual Activity    Alcohol use: No     Comment: over 5 years ago, none currently    Drug use: Not Currently     Comment: Former cocaine use    Sexual activity: Yes     Review of Systems   Constitutional: Positive for appetite change, chills and fatigue. Negative for fever.   HENT: Negative for postnasal drip and trouble swallowing.    Eyes: Negative for pain and visual disturbance.   Respiratory: Negative for choking and shortness of breath.    Cardiovascular: Negative for chest pain and leg swelling.   Gastrointestinal: Positive for abdominal pain and diarrhea. Negative for anal bleeding, blood in stool, nausea, rectal pain and vomiting.   Endocrine: Negative for cold intolerance and heat intolerance.   Genitourinary: Negative for difficulty urinating and hematuria.   Musculoskeletal: Positive for arthralgias and back pain.   Neurological: Negative for dizziness and numbness.   Hematological: Negative for adenopathy. Does not bruise/bleed easily.   Psychiatric/Behavioral: Negative for agitation and confusion.     Objective:      Vital Signs (Most Recent):  Temp: 98.1 °F (36.7 °C) (11/12/20 0819)  Pulse: 79 (11/12/20 0819)  Resp: 18 (11/12/20 1016)  BP: 134/80 (11/12/20 0819)  SpO2: 100 % (11/11/20 2159) Vital Signs (24h Range):  Temp:  [96.6 °F (35.9 °C)-98.1 °F (36.7 °C)] 98.1 °F (36.7 °C)  Pulse:  [74-89] 79  Resp:  [12-20] 18  SpO2:  [97 %-100 %] 100 %  BP: (116-156)/(66-92) 134/80     Weight: 121.4 kg (267 lb 10.2 oz) (11/12/20 0001)  Body mass index is 35.31 kg/m².      Intake/Output Summary (Last 24 hours) at 11/12/2020 1045  Last data filed at 11/12/2020 0900  Gross per 24 hour   Intake 1829.67 ml   Output 2250 ml   Net -420.33 ml       Lines/Drains/Airways     Peripheral Intravenous Line                 Peripheral IV - Single Lumen 11/11/20 0556 20 G Right Hand 1 day         Peripheral IV - Single Lumen 11/11/20 0601 20 G Left Forearm 1 day                Physical Exam  Vitals signs and nursing note reviewed.   Constitutional:       General: He is not in acute distress.     Appearance: He is well-developed. He is not diaphoretic.   HENT:      Head: Normocephalic.   Eyes:      General: No scleral icterus.     Conjunctiva/sclera: Conjunctivae normal.   Neck:      Thyroid: No thyromegaly.      Trachea: No tracheal deviation.   Cardiovascular:      Rate and Rhythm: Normal rate and regular rhythm.      Heart sounds: No friction rub. No gallop.    Pulmonary:      Effort: Pulmonary effort is normal. No respiratory distress.      Breath sounds: Normal breath sounds. No rales.   Abdominal:      General: Bowel sounds are normal. There is no distension.      Palpations: Abdomen is soft.      Tenderness: There is no abdominal tenderness. There is no guarding or rebound.   Musculoskeletal: Normal range of motion.         General: No tenderness.   Neurological:      General: No focal deficit present.      Mental Status: He is alert and oriented to person, place, and time.   Psychiatric:         Behavior: Behavior normal.         Significant  Labs:  CBC:   Recent Labs   Lab 11/11/20  0551 11/12/20  0503   WBC 8.39 6.24   HGB 13.1* 11.9*   HCT 39.2* 36.5*   * 132*     CMP:   Recent Labs   Lab 11/12/20  0503   *   CALCIUM 9.1   ALBUMIN 3.5   PROT 6.8      K 4.1   CO2 24      BUN 15   CREATININE 1.3   ALKPHOS 67   *   AST 73*   BILITOT 0.5       Significant Imaging:  Imaging results within the past 24 hours have been reviewed.    Assessment/Plan:     Transaminitis  - mild elevation in hepatocellular pattern, may be related to sepsis  - LFTs in am  - f/u prograf level    Sepsis  - f/u blood cultures  - agree with abx  - supportive care    S/P liver transplant  - f/u prograf level  - monitor liver function        Thank you for your consult. I will follow-up with patient. Please contact us if you have any additional questions.    Carley Hicks MD  Gastroenterology  [facility]

## 2020-11-13 ENCOUNTER — TELEPHONE (OUTPATIENT)
Dept: FAMILY MEDICINE | Facility: CLINIC | Age: 65
End: 2020-11-13

## 2020-11-13 VITALS
WEIGHT: 268.5 LBS | SYSTOLIC BLOOD PRESSURE: 145 MMHG | HEIGHT: 73 IN | BODY MASS INDEX: 35.59 KG/M2 | HEART RATE: 78 BPM | OXYGEN SATURATION: 95 % | DIASTOLIC BLOOD PRESSURE: 69 MMHG | TEMPERATURE: 98 F | RESPIRATION RATE: 20 BRPM

## 2020-11-13 PROBLEM — A41.9 SEPSIS: Status: RESOLVED | Noted: 2020-11-11 | Resolved: 2020-11-13

## 2020-11-13 PROBLEM — R19.7 DIARRHEA: Status: ACTIVE | Noted: 2020-11-13

## 2020-11-13 LAB
ALBUMIN SERPL BCP-MCNC: 3.6 G/DL (ref 3.5–5.2)
ALP SERPL-CCNC: 62 U/L (ref 55–135)
ALT SERPL W/O P-5'-P-CCNC: 80 U/L (ref 10–44)
ANION GAP SERPL CALC-SCNC: 12 MMOL/L (ref 8–16)
AST SERPL-CCNC: 40 U/L (ref 10–40)
BASOPHILS # BLD AUTO: 0.02 K/UL (ref 0–0.2)
BASOPHILS NFR BLD: 0.3 % (ref 0–1.9)
BILIRUB SERPL-MCNC: 0.6 MG/DL (ref 0.1–1)
BUN SERPL-MCNC: 13 MG/DL (ref 8–23)
CALCIUM SERPL-MCNC: 9.3 MG/DL (ref 8.7–10.5)
CHLORIDE SERPL-SCNC: 102 MMOL/L (ref 95–110)
CO2 SERPL-SCNC: 25 MMOL/L (ref 23–29)
CREAT SERPL-MCNC: 1.3 MG/DL (ref 0.5–1.4)
DIFFERENTIAL METHOD: ABNORMAL
EOSINOPHIL # BLD AUTO: 0.5 K/UL (ref 0–0.5)
EOSINOPHIL NFR BLD: 7.5 % (ref 0–8)
ERYTHROCYTE [DISTWIDTH] IN BLOOD BY AUTOMATED COUNT: 13.6 % (ref 11.5–14.5)
EST. GFR  (AFRICAN AMERICAN): >60 ML/MIN/1.73 M^2
EST. GFR  (NON AFRICAN AMERICAN): 58 ML/MIN/1.73 M^2
GLUCOSE SERPL-MCNC: 150 MG/DL (ref 70–110)
HCT VFR BLD AUTO: 38.6 % (ref 40–54)
HGB BLD-MCNC: 12.5 G/DL (ref 14–18)
IMM GRANULOCYTES # BLD AUTO: 0.02 K/UL (ref 0–0.04)
IMM GRANULOCYTES NFR BLD AUTO: 0.3 % (ref 0–0.5)
LYMPHOCYTES # BLD AUTO: 2.6 K/UL (ref 1–4.8)
LYMPHOCYTES NFR BLD: 40.6 % (ref 18–48)
MAGNESIUM SERPL-MCNC: 1.6 MG/DL (ref 1.6–2.6)
MCH RBC QN AUTO: 27.9 PG (ref 27–31)
MCHC RBC AUTO-ENTMCNC: 32.4 G/DL (ref 32–36)
MCV RBC AUTO: 86 FL (ref 82–98)
MONOCYTES # BLD AUTO: 0.6 K/UL (ref 0.3–1)
MONOCYTES NFR BLD: 10 % (ref 4–15)
NEUTROPHILS # BLD AUTO: 2.6 K/UL (ref 1.8–7.7)
NEUTROPHILS NFR BLD: 41.3 % (ref 38–73)
NRBC BLD-RTO: 0 /100 WBC
PLATELET # BLD AUTO: 145 K/UL (ref 150–350)
PMV BLD AUTO: 12.9 FL (ref 9.2–12.9)
POCT GLUCOSE: 153 MG/DL (ref 70–110)
POCT GLUCOSE: 256 MG/DL (ref 70–110)
POTASSIUM SERPL-SCNC: 4.1 MMOL/L (ref 3.5–5.1)
PROT SERPL-MCNC: 7 G/DL (ref 6–8.4)
RBC # BLD AUTO: 4.48 M/UL (ref 4.6–6.2)
SODIUM SERPL-SCNC: 139 MMOL/L (ref 136–145)
TACROLIMUS BLD-MCNC: 3.7 NG/ML (ref 5–15)
WBC # BLD AUTO: 6.38 K/UL (ref 3.9–12.7)

## 2020-11-13 PROCEDURE — 99214 OFFICE O/P EST MOD 30 MIN: CPT | Mod: ,,, | Performed by: INTERNAL MEDICINE

## 2020-11-13 PROCEDURE — 63600175 PHARM REV CODE 636 W HCPCS: Performed by: NURSE PRACTITIONER

## 2020-11-13 PROCEDURE — 80053 COMPREHEN METABOLIC PANEL: CPT

## 2020-11-13 PROCEDURE — 36415 COLL VENOUS BLD VENIPUNCTURE: CPT

## 2020-11-13 PROCEDURE — 85025 COMPLETE CBC W/AUTO DIFF WBC: CPT

## 2020-11-13 PROCEDURE — 63600175 PHARM REV CODE 636 W HCPCS: Performed by: HOSPITALIST

## 2020-11-13 PROCEDURE — 96372 THER/PROPH/DIAG INJ SC/IM: CPT

## 2020-11-13 PROCEDURE — 90471 IMMUNIZATION ADMIN: CPT | Performed by: HOSPITALIST

## 2020-11-13 PROCEDURE — 96376 TX/PRO/DX INJ SAME DRUG ADON: CPT

## 2020-11-13 PROCEDURE — 25000003 PHARM REV CODE 250: Performed by: NURSE PRACTITIONER

## 2020-11-13 PROCEDURE — 99214 PR OFFICE/OUTPT VISIT, EST, LEVL IV, 30-39 MIN: ICD-10-PCS | Mod: ,,, | Performed by: INTERNAL MEDICINE

## 2020-11-13 PROCEDURE — 90686 IIV4 VACC NO PRSV 0.5 ML IM: CPT | Performed by: HOSPITALIST

## 2020-11-13 PROCEDURE — G0008 ADMIN INFLUENZA VIRUS VAC: HCPCS | Performed by: HOSPITALIST

## 2020-11-13 PROCEDURE — 83735 ASSAY OF MAGNESIUM: CPT

## 2020-11-13 PROCEDURE — G0378 HOSPITAL OBSERVATION PER HR: HCPCS

## 2020-11-13 RX ADMIN — LIDOCAINE 1 PATCH: 50 PATCH TOPICAL at 09:11

## 2020-11-13 RX ADMIN — LISINOPRIL 40 MG: 10 TABLET ORAL at 09:11

## 2020-11-13 RX ADMIN — CHOLECALCIFEROL TAB 10 MCG (400 UNIT) 400 UNITS: 10 TAB at 09:11

## 2020-11-13 RX ADMIN — ROSUVASTATIN CALCIUM 5 MG: 5 TABLET, COATED ORAL at 09:11

## 2020-11-13 RX ADMIN — THERA TABS 1 TABLET: TAB at 09:11

## 2020-11-13 RX ADMIN — INFLUENZA VIRUS VACCINE 0.5 ML: 15; 15; 15; 15 SUSPENSION INTRAMUSCULAR at 12:11

## 2020-11-13 RX ADMIN — INSULIN ASPART 2 UNITS: 100 INJECTION, SOLUTION INTRAVENOUS; SUBCUTANEOUS at 06:11

## 2020-11-13 RX ADMIN — INSULIN ASPART 8 UNITS: 100 INJECTION, SOLUTION INTRAVENOUS; SUBCUTANEOUS at 09:11

## 2020-11-13 RX ADMIN — INSULIN ASPART 6 UNITS: 100 INJECTION, SOLUTION INTRAVENOUS; SUBCUTANEOUS at 12:11

## 2020-11-13 RX ADMIN — INSULIN ASPART 8 UNITS: 100 INJECTION, SOLUTION INTRAVENOUS; SUBCUTANEOUS at 12:11

## 2020-11-13 RX ADMIN — LEVOTHYROXINE SODIUM 88 MCG: 88 TABLET ORAL at 06:11

## 2020-11-13 RX ADMIN — SERTRALINE HYDROCHLORIDE 100 MG: 100 TABLET ORAL at 09:11

## 2020-11-13 RX ADMIN — OXYCODONE HYDROCHLORIDE 5 MG: 5 TABLET ORAL at 06:11

## 2020-11-13 RX ADMIN — NIFEDIPINE 60 MG: 30 TABLET, FILM COATED, EXTENDED RELEASE ORAL at 09:11

## 2020-11-13 RX ADMIN — METOPROLOL SUCCINATE 200 MG: 50 TABLET, EXTENDED RELEASE ORAL at 09:11

## 2020-11-13 RX ADMIN — PIPERACILLIN AND TAZOBACTAM 4.5 G: 4; .5 INJECTION, POWDER, LYOPHILIZED, FOR SOLUTION INTRAVENOUS; PARENTERAL at 06:11

## 2020-11-13 RX ADMIN — GABAPENTIN 800 MG: 400 CAPSULE ORAL at 09:11

## 2020-11-13 RX ADMIN — TACROLIMUS 2 MG: 1 CAPSULE ORAL at 09:11

## 2020-11-13 RX ADMIN — CALCIUM CARBONATE (ANTACID) CHEW TAB 500 MG 1000 MG: 500 CHEW TAB at 09:11

## 2020-11-13 NOTE — SUBJECTIVE & OBJECTIVE
"Past Medical History:   Diagnosis Date    Anemia     Anxiety     Chronic pain syndrome 7/13/2011    CKD (chronic kidney disease), stage III     Diabetes mellitus type II, uncontrolled     Discitis of lumbosacral region 1/16/2015    ED (erectile dysfunction)     Encounter for blood transfusion     Genital herpes     Gout, arthritis     History of alcohol abuse     History of hepatitis C, s/p successful Rx w/ SVR24 (cure) - 5/2018 8/23/2011    Completed 24wks Epclusa + RBV w/SVR12 - 2/2018  -     History of positive PPD, treatment status unknown     Pulmonary granulomas, negative sputum cultures for AFB and indeterminate quantferon test    History of substance abuse     Hypertension     Hypothyroidism     Liver replaced by transplant 8/23/2011    DATE: 12/16/2013  LIVER BIOPSY : REASON:  hep C staging  PATHOLOGY COMMITTEE NOTE/PLAN: :grade  1 / stage 1        Pancreatitis 2016    Peptic ulcer disease        Past Surgical History:   Procedure Laterality Date    CHOLECYSTECTOMY      INJECTION OF JOINT Right 12/2/2019    Procedure: INJECTION, JOINT SI;  Surgeon: Thu Penn MD;  [place] [facility] [place] [facility];  Service: Pain Management;  Laterality: Right;  RT SI JNT INJ    LIVER TRANSPLANT  06/2010    SPINE SURGERY         Review of patient's allergies indicates:  No Known Allergies    No current facility-administered medications on file prior to encounter.      Current Outpatient Medications on File Prior to Encounter   Medication Sig    allopurinoL (ZYLOPRIM) 100 MG tablet TAKE 1 TABLET BY MOUTH TWICE A DAY    BD ULTRA-FINE MENDY PEN NEEDLES 32 gauge x 5/32" Ndle     calcium carbonate-vitamin D3 (CALTRATE 600 + D) 600 mg(1,500mg) -400 unit Chew     dulaglutide (TRULICITY) 1.5 mg/0.5 mL pen injector Inject 1.5 mg (1 syringe) into the skin every 7 days.    gabapentin (NEURONTIN) 800 MG tablet Take 1 tablet (800 mg total) by mouth 3 (three) times daily.    insulin aspart U-100 (NOVOLOG FLEXPEN U-100 " "INSULIN) 100 unit/mL (3 mL) InPn pen Inject 20 Units into the skin 3 (three) times daily with meals.    insulin detemir U-100 (LEVEMIR FLEXTOUCH) 100 unit/mL (3 mL) SubQ InPn pen Inject 70 Units into the skin every evening. (Patient taking differently: Inject 80 Units into the skin every evening. )    lancets Misc 1 each by Misc.(Non-Drug; Combo Route) route 4 (four) times daily before meals and nightly.    levothyroxine (SYNTHROID) 88 MCG tablet Take 1 tablet (88 mcg total) by mouth once daily.    lisinopriL (PRINIVIL,ZESTRIL) 40 MG tablet TAKE 1 TABLET BY MOUTH EVERY DAY    metoprolol succinate (TOPROL-XL) 200 MG 24 hr tablet TAKE 1 TABLET (200 MG TOTAL) BY MOUTH ONCE DAILY.    multivitamin (THERAGRAN) per tablet Take 1 tablet by mouth.    NIFEdipine (ADALAT CC) 60 MG TbSR TAKE 1 TABLET BY MOUTH ONCE DAILY    NOVOFINE PLUS 32 gauge x 1/6" Ndle     rosuvastatin (CRESTOR) 5 MG tablet TAKE 1 TABLET BY MOUTH EVERY DAY    sertraline (ZOLOFT) 100 MG tablet TAKE 1 TABLET (100 MG TOTAL) BY MOUTH ONCE DAILY.    tacrolimus (PROGRAF) 1 MG Cap TAKE 2 CAPSULES (2 MG TOTAL) BY MOUTH IN THE MORNING & TAKE 1 CAPSULE (1 MG TOTAL) IN THE EVENING    traZODone (DESYREL) 50 MG tablet Take 1 tablet (50 mg total) by mouth every evening.    TRUE METRIX GLUCOSE METER Misc TEST 4 (FOUR) TIMES DAILY BEFORE MEALS AND NIGHTLY.    TRUE METRIX GLUCOSE TEST STRIP Strp USE 3 TIMES DAILY TO TEST BLOOD GLUCOSE LEVEL    colchicine 0.6 mg tablet Take 1 tablet (0.6 mg total) by mouth 2 (two) times daily.    diclofenac sodium (VOLTAREN) 1 % Gel Apply 2 g topically 4 (four) times daily as needed.    ondansetron (ZOFRAN-ODT) 4 MG TbDL Take 1 tablet (4 mg total) by mouth every 8 (eight) hours as needed (n/v).    pantoprazole (PROTONIX) 20 MG tablet Take 1 tablet (20 mg total) by mouth once daily.    pen needle, diabetic (BD ULTRA-FINE MENDY PEN NEEDLE) 32 gauge x 5/32" Ndle TEST WITH NOVOLOG THREE TIMES A DAY WITH MEALS AND WITH LEVEMIR " AT BEDTIME    [DISCONTINUED] amitriptyline (ELAVIL) 50 MG tablet Take 1 tablet (50 mg total) by mouth every evening. For nerve pain and sleep    [DISCONTINUED] NIFEdipine (ADALAT CC) 60 MG TbSR TAKE 1 TABLET (60 MG TOTAL) BY MOUTH ONCE DAILY.    [DISCONTINUED] rosuvastatin (CRESTOR) 5 MG tablet Take 1 tablet (5 mg total) by mouth once daily.     Family History     Problem Relation (Age of Onset)    Cancer Mother, Maternal Uncle (82)    Diabetes Mother, Sister    Heart disease Mother        Tobacco Use    Smoking status: Former Smoker    Smokeless tobacco: Never Used   Substance and Sexual Activity    Alcohol use: No     Comment: over 5 years ago, none currently    Drug use: Not Currently     Comment: Former cocaine use    Sexual activity: Yes     Review of Systems   Constitutional: Negative for appetite change, chills and fever.   HENT: Negative for congestion, sore throat and trouble swallowing.    Eyes: Negative for pain, redness and visual disturbance.   Respiratory: Negative for cough and shortness of breath.    Cardiovascular: Negative for chest pain and palpitations.   Gastrointestinal: Positive for abdominal pain. Negative for blood in stool, constipation, diarrhea, nausea and vomiting.   Genitourinary: Negative for difficulty urinating, dysuria and hematuria.   Musculoskeletal: Positive for back pain.   Skin: Negative for rash and wound.   Neurological: Negative for dizziness, weakness, light-headedness, numbness and headaches.   Psychiatric/Behavioral: Negative for confusion.     Objective:     Vital Signs (Most Recent):  Temp: 97.9 °F (36.6 °C) (11/12/20 1555)  Pulse: 78 (11/12/20 1555)  Resp: 16 (11/12/20 1805)  BP: (!) 163/94 (11/12/20 1722)  SpO2: 100 % (11/11/20 2159) Vital Signs (24h Range):  Temp:  [96.6 °F (35.9 °C)-98.1 °F (36.7 °C)] 97.9 °F (36.6 °C)  Pulse:  [77-89] 78  Resp:  [13-20] 16  SpO2:  [100 %] 100 %  BP: (132-164)/() 163/94     Weight: 121.4 kg (267 lb 10.2 oz)  Body mass  index is 35.31 kg/m².    Physical Exam  Vitals signs and nursing note reviewed.   Constitutional:       General: He is not in acute distress.     Appearance: He is obese.   HENT:      Head: Normocephalic and atraumatic.      Mouth/Throat:      Mouth: Mucous membranes are moist.   Eyes:      Conjunctiva/sclera: Conjunctivae normal.   Neck:      Musculoskeletal: Neck supple.   Cardiovascular:      Rate and Rhythm: Normal rate and regular rhythm.      Pulses: Normal pulses.   Pulmonary:      Effort: Pulmonary effort is normal.      Breath sounds: Normal breath sounds.   Abdominal:      General: Bowel sounds are normal. There is no distension.      Palpations: Abdomen is soft.      Tenderness: There is abdominal tenderness.      Comments: obese   Musculoskeletal:      Right lower leg: Edema present.      Left lower leg: Edema present.   Skin:     General: Skin is warm and dry.   Neurological:      Mental Status: He is alert and oriented to person, place, and time.   Psychiatric:         Mood and Affect: Mood normal.         Behavior: Behavior normal.         Thought Content: Thought content normal.             Significant Labs:   A1C:   Recent Labs   Lab 09/01/20  0829 11/11/20  1553   HGBA1C 11.9* 9.1*     Blood Culture:   Recent Labs   Lab 11/11/20  0545   LABBLOO No Growth to date  No Growth to date  No Growth to date  No Growth to date     CBC:   Recent Labs   Lab 11/11/20  0551 11/12/20  0503   WBC 8.39 6.24   HGB 13.1* 11.9*   HCT 39.2* 36.5*   * 132*     CMP:   Recent Labs   Lab 11/11/20  0551 11/12/20  0503    137   K 4.2 4.1    104   CO2 22* 24   * 217*   BUN 16 15   CREATININE 1.4 1.3   CALCIUM 10.1 9.1   PROT 7.6 6.8   ALBUMIN 4.2 3.5   BILITOT 0.6 0.5   ALKPHOS 93 67   AST 90* 73*   ALT 70* 100*   ANIONGAP 13 9   EGFRNONAA 53* 58*     Lactic Acid:   Recent Labs   Lab 11/11/20  0551 11/11/20  0907   LACTATE 2.7* 2.3*     Magnesium:   Recent Labs   Lab 11/11/20  0551 11/12/20  0503    MG 1.1* 1.4*     POCT Glucose:   Recent Labs   Lab 11/12/20  0506 11/12/20  1148 11/12/20  1558   POCTGLUCOSE 177* 222* 145*     Troponin:   Recent Labs   Lab 11/11/20  0551   TROPONINI 0.007     TSH:   Recent Labs   Lab 11/11/20  0551   TSH 1.046     Urine Studies:   Recent Labs   Lab 11/11/20  0945   COLORU Yellow   APPEARANCEUA Clear   PHUR 5.0   SPECGRAV 1.015   PROTEINUA 1+*   GLUCUA Trace*   KETONESU 1+*   BILIRUBINUA Negative   OCCULTUA Trace*   NITRITE Negative   UROBILINOGEN Negative   LEUKOCYTESUR Negative   RBCUA 0   WBCUA 1   BACTERIA None   SQUAMEPITHEL 2   HYALINECASTS 0       Significant Imaging: I have reviewed all pertinent imaging results/findings within the past 24 hours.

## 2020-11-13 NOTE — DISCHARGE SUMMARY
[facility] Hospital Medicine  Discharge Summary      Patient Name: Vick Gonzalez  MRN: [id number]  Admission Date: 11/11/2020  Hospital Length of Stay: 0 days  Discharge Date and Time: 11/13/2020  1:50 PM  Attending Physician: No att. providers found   Discharging Provider: Kamini Cueva NP  Primary Care Provider: Emanuel Lopez MD      HPI:   Mr. Gonzalez is a 65 yo male with a pertinent history of liver transplant (2011) on prograf, Hx Hep C (treated), pancreatitis, chronic pain, drug and alcohol abuse who presented to the ED this morning with c/o chills, body aches, and abdominal pain x 3 hrs PTA. He states he woke up around 3 am and had diarrhea then just didn't feel well. He reports pain to his epigastric area as well as left flank. He also reports pain across his lower back that is worse than usual. He has chronic back pain with a history of back surgery. He denies dysuria, chest pain, SOB, nausea, vomiting. He denies recent known sick contacts. He reports compliance with all of his medications including prograf. He denies complications from his liver transplant since 2011.    In the ED, he had an elevated lactate 2.7. His temp was elevated at 100, tachycardic 120. Elevated SED rate and CRP. CK 1297. He was started on zosyn and given fluids per sepsis protocol. The patient was admitted to [facility] hospital medicine service for further care.       * No surgery found *      Hospital Course:   The patient presented with diarrhea, low grade temp, weakness, and tachycardia. Treated for sepsis because he is immunocompromised with liver transplant. Likely was just a viral syndrome. Patient progressed well. Tacrolimus level noted to be 3.7. Patient admitted to not taking his daily dosages as prescribed. Discussed the importance of compliance with medication regimen. Also discussed low carb diet and weight loss to improve glucose control. The patient was discharged home. Instructed to  follow up with hepatology as directed.       Consults:   Consults (From admission, onward)        Status Ordering Provider     Inpatient consult to [facility]  Once     Provider:  (Not yet assigned)    Completed JAE BELLAMY     IP consult to case management  Once     Provider:  (Not yet assigned)    Acknowledged JAE BELLAMY          * Diarrhea  Admitted with c/o diarrhea  Has not had any diarrhea while inpatient  DC home      Right lower quadrant abdominal pain  Persistent c/o abdominal pain  CT abdomen negative  Eating well, no diarrhea-had normal BM   Ambulation  DC home         Transaminitis  Mild elevation-improved  Stop IV fluids-patient eating and drinking  Hepatitis panel complete-history of Hep C - was treated  GI following  Discharge home        S/P liver transplant  Admits to noncompliance with prograf  Mild transaminitis  GI consulted-following LFTs, tacrolimus level 3.7  LFTs improved  DC home      Chronic pain syndrome  Lumbar radiculopathy    DC home        Final Active Diagnoses:    Diagnosis Date Noted POA    PRINCIPAL PROBLEM:  Diarrhea [R19.7] 11/13/2020 Yes    Right lower quadrant abdominal pain [R10.31] 11/12/2020 Yes    Transaminitis [R74.01] 11/11/2020 Yes    BPH with urinary obstruction [N40.1, N13.8] 10/09/2017 Yes    CKD (chronic kidney disease), stage III [N18.30]  Yes    Diabetes mellitus type II, uncontrolled [E11.65] 10/04/2016 Yes    S/P liver transplant [Z94.4] 10/04/2016 Not Applicable    Essential hypertension [I10] 06/19/2015 Yes    Lumbar radiculopathy [M54.16] 04/08/2015 Yes    Hypothyroidism [E03.9]  Yes    Chronic pain syndrome [G89.4] 07/13/2011 Yes    Gout, unspecified [M10.9] 03/14/2011 Yes      Problems Resolved During this Admission:    Diagnosis Date Noted Date Resolved POA    Sepsis [A41.9] 11/11/2020 11/13/2020 Yes       Discharged Condition: stable    Disposition: Home or Self Care    Follow Up:  Follow-up Information      Emanuel Lopez MD. Schedule an appointment as soon as possible for a visit in 1 week.    Specialty: Family Medicine  Why: Dr. Lopez's office will call you with your hospital follow up appointment.  Contact information:  [address]  [address]  [place] [id number] [phone]                 Patient Instructions:      Diet Cardiac     Diet diabetic     Notify your health care provider if you experience any of the following:  temperature >100.4     Notify your health care provider if you experience any of the following:  persistent nausea and vomiting or diarrhea     Notify your health care provider if you experience any of the following:  severe uncontrolled pain     Notify your health care provider if you experience any of the following:  difficulty breathing or increased cough     Notify your health care provider if you experience any of the following:  severe persistent headache     Notify your health care provider if you experience any of the following:  worsening rash     Notify your health care provider if you experience any of the following:  persistent dizziness, light-headedness, or visual disturbances     Notify your health care provider if you experience any of the following:  increased confusion or weakness     Notify your health care provider if you experience any of the following:     Notify your health care provider if you experience any of the following:  redness, tenderness, or signs of infection (pain, swelling, redness, odor or green/yellow discharge around incision site)     Activity as tolerated       Significant Diagnostic Studies:     Pending Diagnostic Studies:     None         Medications:  Reconciled Home Medications:      Medication List      CHANGE how you take these medications    LEVEMIR FLEXTOUCH U-100 INSULN 100 unit/mL (3 mL) Inpn pen  Generic drug: insulin detemir U-100  Inject 70 Units into the skin every evening.  What changed: how much to take        CONTINUE taking these  "medications    allopurinoL 100 MG tablet  Commonly known as: ZYLOPRIM  TAKE 1 TABLET BY MOUTH TWICE A DAY     CALTRATE 600 PLUS D 600 mg(1,500mg) -400 unit Chew  Generic drug: calcium carbonate-vitamin D3     colchicine 0.6 mg tablet  Commonly known as: COLCRYS  Take 1 tablet (0.6 mg total) by mouth 2 (two) times daily.     diclofenac sodium 1 % Gel  Commonly known as: VOLTAREN  Apply 2 g topically 4 (four) times daily as needed.     gabapentin 800 MG tablet  Commonly known as: NEURONTIN  Take 1 tablet (800 mg total) by mouth 3 (three) times daily.     lancets Misc  1 each by Misc.(Non-Drug; Combo Route) route 4 (four) times daily before meals and nightly.     levothyroxine 88 MCG tablet  Commonly known as: SYNTHROID  Take 1 tablet (88 mcg total) by mouth once daily.     lisinopriL 40 MG tablet  Commonly known as: PRINIVIL,ZESTRIL  TAKE 1 TABLET BY MOUTH EVERY DAY     metoprolol succinate 200 MG 24 hr tablet  Commonly known as: TOPROL-XL  TAKE 1 TABLET (200 MG TOTAL) BY MOUTH ONCE DAILY.     multivitamin per tablet  Commonly known as: THERAGRAN  Take 1 tablet by mouth.     NIFEdipine 60 MG Tbsr  Commonly known as: ADALAT CC  TAKE 1 TABLET BY MOUTH ONCE DAILY     * NOVOFINE PLUS 32 gauge x 1/6" Ndle  Generic drug: pen needle, diabetic     * BD ULTRA-FINE MENDY PEN NEEDLE 32 gauge x 5/32" Ndle  Generic drug: pen needle, diabetic     * BD ULTRA-FINE MENDY PEN NEEDLE 32 gauge x 5/32" Ndle  Generic drug: pen needle, diabetic  TEST WITH NOVOLOG THREE TIMES A DAY WITH MEALS AND WITH LEVEMIR AT BEDTIME     NovoLOG Flexpen U-100 Insulin 100 unit/mL (3 mL) Inpn pen  Generic drug: insulin aspart U-100  Inject 20 Units into the skin 3 (three) times daily with meals.     ondansetron 4 MG Tbdl  Commonly known as: ZOFRAN-ODT  Take 1 tablet (4 mg total) by mouth every 8 (eight) hours as needed (n/v).     pantoprazole 20 MG tablet  Commonly known as: PROTONIX  Take 1 tablet (20 mg total) by mouth once daily.     rosuvastatin 5 MG " tablet  Commonly known as: CRESTOR  TAKE 1 TABLET BY MOUTH EVERY DAY     sertraline 100 MG tablet  Commonly known as: ZOLOFT  TAKE 1 TABLET (100 MG TOTAL) BY MOUTH ONCE DAILY.     tacrolimus 1 MG Cap  Commonly known as: PROGRAF  TAKE 2 CAPSULES (2 MG TOTAL) BY MOUTH IN THE MORNING & TAKE 1 CAPSULE (1 MG TOTAL) IN THE EVENING     traZODone 50 MG tablet  Commonly known as: DESYREL  Take 1 tablet (50 mg total) by mouth every evening.     TRUE METRIX GLUCOSE METER Misc  Generic drug: blood-glucose meter  TEST 4 (FOUR) TIMES DAILY BEFORE MEALS AND NIGHTLY.     TRUE METRIX GLUCOSE TEST STRIP Strp  Generic drug: blood sugar diagnostic  USE 3 TIMES DAILY TO TEST BLOOD GLUCOSE LEVEL     TRULICITY 1.5 mg/0.5 mL pen injector  Generic drug: dulaglutide  Inject 1.5 mg (1 syringe) into the skin every 7 days.         * This list has 3 medication(s) that are the same as other medications prescribed for you. Read the directions carefully, and ask your doctor or other care provider to review them with you.                Indwelling Lines/Drains at time of discharge:   Lines/Drains/Airways     None                 Time spent on the discharge of patient: 45 minutes  Patient was seen and examined on the date of discharge and determined to be suitable for discharge.         Kamini Cueva NP  Department of Hospital Medicine  [facility]

## 2020-11-13 NOTE — ASSESSMENT & PLAN NOTE
Mild elevation-improved  Stop IV fluids-patient eating and drinking  Hepatitis panel complete-history of Hep C - was treated  GI following  Discharge home

## 2020-11-13 NOTE — ASSESSMENT & PLAN NOTE
- improving  - f/u prograf level  - if anything is needed over the weekend regarding this patient please do not hesitate to call

## 2020-11-13 NOTE — PROGRESS NOTES
[facility]  Gastroenterology  Progress Note    Patient Name: Vick Gonzalez  MRN: [id number]  Admission Date: 11/11/2020  Hospital Length of Stay: 0 days  Code Status: Full Code   Attending Provider: Velasquez Edwards, *  Consulting Provider: Carley Hicks MD  Primary Care Physician: Emanuel Lopez MD  Principal Problem: <principal problem not specified>      Subjective:     Interval History: No acute issues overnight, afebrile and hemodyn stable. No vomiting, abd pain.     Review of Systems   Constitutional: Negative for fever and unexpected weight change.   Respiratory: Negative for wheezing and stridor.    Cardiovascular: Negative for palpitations and leg swelling.   Gastrointestinal: Negative for abdominal distention and abdominal pain.     Objective:     Vital Signs (Most Recent):  Temp: 98.5 °F (36.9 °C) (11/13/20 0516)  Pulse: 76 (11/13/20 0516)  Resp: 14 (11/13/20 0606)  BP: 132/75 (11/13/20 0516)  SpO2: 95 % (11/12/20 1959) Vital Signs (24h Range):  Temp:  [97.7 °F (36.5 °C)-98.9 °F (37.2 °C)] 98.5 °F (36.9 °C)  Pulse:  [72-79] 76  Resp:  [14-20] 14  SpO2:  [95 %] 95 %  BP: (127-164)/() 132/75     Weight: 121.8 kg (268 lb 8.3 oz) (11/12/20 2351)  Body mass index is 35.43 kg/m².      Intake/Output Summary (Last 24 hours) at 11/13/2020 0734  Last data filed at 11/13/2020 0607  Gross per 24 hour   Intake 1375 ml   Output 3325 ml   Net -1950 ml       Lines/Drains/Airways     Peripheral Intravenous Line                 Peripheral IV - Single Lumen 11/11/20 0601 20 G Left Forearm 2 days                Physical Exam  Vitals signs and nursing note reviewed.   Constitutional:       General: He is not in acute distress.     Appearance: He is well-developed. He is not diaphoretic.   HENT:      Head: Normocephalic and atraumatic.      Nose: Nose normal.   Eyes:      General: No scleral icterus.     Conjunctiva/sclera: Conjunctivae normal.   Neck:      Musculoskeletal: Normal range of  motion and neck supple.      Thyroid: No thyromegaly.   Pulmonary:      Effort: Pulmonary effort is normal. No respiratory distress.      Breath sounds: No wheezing.   Abdominal:      General: Bowel sounds are normal. There is no distension.      Palpations: Abdomen is soft.      Tenderness: There is no abdominal tenderness.   Skin:     General: Skin is warm and dry.      Findings: No erythema or rash.   Neurological:      General: No focal deficit present.      Mental Status: He is alert and oriented to person, place, and time.   Psychiatric:         Mood and Affect: Mood normal.         Behavior: Behavior normal.         Significant Labs:  CBC:   Recent Labs   Lab 11/12/20  0503 11/13/20  0446   WBC 6.24 6.38   HGB 11.9* 12.5*   HCT 36.5* 38.6*   * 145*     CMP:   Recent Labs   Lab 11/13/20  0446   *   CALCIUM 9.3   ALBUMIN 3.6   PROT 7.0      K 4.1   CO2 25      BUN 13   CREATININE 1.3   ALKPHOS 62   ALT 80*   AST 40   BILITOT 0.6         Significant Imaging:  Imaging results within the past 24 hours have been reviewed.    Assessment/Plan:     Transaminitis  - improving  - f/u prograf level  - if anything is needed over the weekend regarding this patient please do not hesitate to call    S/P liver transplant  - f/u prograf level  - monitor liver function, improving  - ok to d/c from GI standpoint        Thank you for your consult. I will follow-up with patient. Please contact us if you have any additional questions.    Carley Hicks MD  Gastroenterology  [facility]

## 2020-11-13 NOTE — ASSESSMENT & PLAN NOTE
Admitted with c/o diarrhea  Has not had any diarrhea while inpatient  [facility]

## 2020-11-13 NOTE — ASSESSMENT & PLAN NOTE
Persistent c/o abdominal pain  CT abdomen negative  Eating well, no diarrhea-had normal BM   Ambulation  DC home

## 2020-11-13 NOTE — HOSPITAL COURSE
The patient presented with diarrhea, low grade temp, weakness, and tachycardia. Treated for sepsis because he is immunocompromised with liver transplant. Likely was just a viral syndrome. Patient progressed well. Tacrolimus level noted to be 3.7. Patient admitted to not taking his daily dosages as prescribed. Discussed the importance of compliance with medication regimen. Also discussed low carb diet and weight loss to improve glucose control. The patient was discharged home. Instructed to follow up with hepatology as directed.

## 2020-11-13 NOTE — ASSESSMENT & PLAN NOTE
Mild elevation  Will trend, GI following  Stop IV fluids-patient eating and drinking  Hepatitis panel complete-history of Hep C - was treated

## 2020-11-13 NOTE — ASSESSMENT & PLAN NOTE
Persistent c/o abdominal pain  CT abdomen negative  Eating well, no diarrhea-had normal BM   Cont PRN oxycodone  ambulation

## 2020-11-13 NOTE — PROGRESS NOTES
"[facility]                    Pharmacy       Discharge Medication Education    Patient ACCEPTED medication education. Pharmacy has provided education on the name, indication, and possible side effects of the medication(s) prescribed, using teach-back method.     The following medications have also been discussed, during this admission.        Medication List        CHANGE how you take these medications      LEVEMIR FLEXTOUCH U-100 INSULN 100 unit/mL (3 mL) Inpn pen  Generic drug: insulin detemir U-100  Inject 70 Units into the skin every evening.  What changed: how much to take            CONTINUE taking these medications      allopurinoL 100 MG tablet  Commonly known as: ZYLOPRIM  TAKE 1 TABLET BY MOUTH TWICE A DAY     CALTRATE 600 PLUS D 600 mg(1,500mg) -400 unit Chew  Generic drug: calcium carbonate-vitamin D3     colchicine 0.6 mg tablet  Commonly known as: COLCRYS  Take 1 tablet (0.6 mg total) by mouth 2 (two) times daily.     diclofenac sodium 1 % Gel  Commonly known as: VOLTAREN  Apply 2 g topically 4 (four) times daily as needed.     gabapentin 800 MG tablet  Commonly known as: NEURONTIN  Take 1 tablet (800 mg total) by mouth 3 (three) times daily.     lancets Misc  1 each by Misc.(Non-Drug; Combo Route) route 4 (four) times daily before meals and nightly.     levothyroxine 88 MCG tablet  Commonly known as: SYNTHROID  Take 1 tablet (88 mcg total) by mouth once daily.     lisinopriL 40 MG tablet  Commonly known as: PRINIVIL,ZESTRIL  TAKE 1 TABLET BY MOUTH EVERY DAY     metoprolol succinate 200 MG 24 hr tablet  Commonly known as: TOPROL-XL  TAKE 1 TABLET (200 MG TOTAL) BY MOUTH ONCE DAILY.     multivitamin per tablet  Commonly known as: THERAGRAN     NIFEdipine 60 MG Tbsr  Commonly known as: ADALAT CC  TAKE 1 TABLET BY MOUTH ONCE DAILY     * NOVOFINE PLUS 32 gauge x 1/6" Ndle  Generic drug: pen needle, diabetic     * BD ULTRA-FINE MENDY PEN NEEDLE 32 gauge x 5/32" Ndle  Generic drug: pen " "needle, diabetic     * BD ULTRA-FINE MENDY PEN NEEDLE 32 gauge x 5/32" Ndle  Generic drug: pen needle, diabetic  TEST WITH NOVOLOG THREE TIMES A DAY WITH MEALS AND WITH LEVEMIR AT BEDTIME     NovoLOG Flexpen U-100 Insulin 100 unit/mL (3 mL) Inpn pen  Generic drug: insulin aspart U-100  Inject 20 Units into the skin 3 (three) times daily with meals.     ondansetron 4 MG Tbdl  Commonly known as: ZOFRAN-ODT  Take 1 tablet (4 mg total) by mouth every 8 (eight) hours as needed (n/v).     pantoprazole 20 MG tablet  Commonly known as: PROTONIX  Take 1 tablet (20 mg total) by mouth once daily.     rosuvastatin 5 MG tablet  Commonly known as: CRESTOR  TAKE 1 TABLET BY MOUTH EVERY DAY     sertraline 100 MG tablet  Commonly known as: ZOLOFT  TAKE 1 TABLET (100 MG TOTAL) BY MOUTH ONCE DAILY.     tacrolimus 1 MG Cap  Commonly known as: PROGRAF  TAKE 2 CAPSULES (2 MG TOTAL) BY MOUTH IN THE MORNING & TAKE 1 CAPSULE (1 MG TOTAL) IN THE EVENING     traZODone 50 MG tablet  Commonly known as: DESYREL  Take 1 tablet (50 mg total) by mouth every evening.     TRUE METRIX GLUCOSE METER Misc  Generic drug: blood-glucose meter  TEST 4 (FOUR) TIMES DAILY BEFORE MEALS AND NIGHTLY.     TRUE METRIX GLUCOSE TEST STRIP Strp  Generic drug: blood sugar diagnostic  USE 3 TIMES DAILY TO TEST BLOOD GLUCOSE LEVEL     TRULICITY 1.5 mg/0.5 mL pen injector  Generic drug: dulaglutide  Inject 1.5 mg (1 syringe) into the skin every 7 days.           * This list has 3 medication(s) that are the same as other medications prescribed for you. Read the directions carefully, and ask your doctor or other care provider to review them with you.                   Thank you  Linda Marie, PharmD  [phone]    "

## 2020-11-13 NOTE — SUBJECTIVE & OBJECTIVE
Subjective:     Interval History: No acute issues overnight, afebrile and hemodyn stable. No vomiting, abd pain.     Review of Systems   Constitutional: Negative for fever and unexpected weight change.   Respiratory: Negative for wheezing and stridor.    Cardiovascular: Negative for palpitations and leg swelling.   Gastrointestinal: Negative for abdominal distention and abdominal pain.     Objective:     Vital Signs (Most Recent):  Temp: 98.5 °F (36.9 °C) (11/13/20 0516)  Pulse: 76 (11/13/20 0516)  Resp: 14 (11/13/20 0606)  BP: 132/75 (11/13/20 0516)  SpO2: 95 % (11/12/20 1959) Vital Signs (24h Range):  Temp:  [97.7 °F (36.5 °C)-98.9 °F (37.2 °C)] 98.5 °F (36.9 °C)  Pulse:  [72-79] 76  Resp:  [14-20] 14  SpO2:  [95 %] 95 %  BP: (127-164)/() 132/75     Weight: 121.8 kg (268 lb 8.3 oz) (11/12/20 2351)  Body mass index is 35.43 kg/m².      Intake/Output Summary (Last 24 hours) at 11/13/2020 0734  Last data filed at 11/13/2020 0607  Gross per 24 hour   Intake 1375 ml   Output 3325 ml   Net -1950 ml       Lines/Drains/Airways     Peripheral Intravenous Line                 Peripheral IV - Single Lumen 11/11/20 0601 20 G Left Forearm 2 days                Physical Exam  Vitals signs and nursing note reviewed.   Constitutional:       General: He is not in acute distress.     Appearance: He is well-developed. He is not diaphoretic.   HENT:      Head: Normocephalic and atraumatic.      Nose: Nose normal.   Eyes:      General: No scleral icterus.     Conjunctiva/sclera: Conjunctivae normal.   Neck:      Musculoskeletal: Normal range of motion and neck supple.      Thyroid: No thyromegaly.   Pulmonary:      Effort: Pulmonary effort is normal. No respiratory distress.      Breath sounds: No wheezing.   Abdominal:      General: Bowel sounds are normal. There is no distension.      Palpations: Abdomen is soft.      Tenderness: There is no abdominal tenderness.   Skin:     General: Skin is warm and dry.      Findings: No  erythema or rash.   Neurological:      General: No focal deficit present.      Mental Status: He is alert and oriented to person, place, and time.   Psychiatric:         Mood and Affect: Mood normal.         Behavior: Behavior normal.         Significant Labs:  CBC:   Recent Labs   Lab 11/12/20  0503 11/13/20  0446   WBC 6.24 6.38   HGB 11.9* 12.5*   HCT 36.5* 38.6*   * 145*     CMP:   Recent Labs   Lab 11/13/20 0446   *   CALCIUM 9.3   ALBUMIN 3.6   PROT 7.0      K 4.1   CO2 25      BUN 13   CREATININE 1.3   ALKPHOS 62   ALT 80*   AST 40   BILITOT 0.6         Significant Imaging:  Imaging results within the past 24 hours have been reviewed.

## 2020-11-13 NOTE — TELEPHONE ENCOUNTER
----- Message from Gris Mayorga sent at 11/13/2020  9:37 AM CST -----  Patient needs to be seen sooner than the next available appointment for a hospital f/u - within 1 week to 10 days from today. Please call patient and advise.

## 2020-11-13 NOTE — ASSESSMENT & PLAN NOTE
Unknown source-likely viral etiology but will treat due to immunocompromised state  CT abd no acute findings, UA negative, chest Xray negative, BC NGTD  Cont zosyn  Stop IV fluids-patient eating and drinking-no diarrhea  Vitals stable-no fevers  Will switch to PO antibiotics in the morning

## 2020-11-13 NOTE — PLAN OF CARE
Problem: Adult Inpatient Plan of Care  Goal: Care Plan and Chart reviewed and updated  Outcome: Ongoing, Progressing

## 2020-11-13 NOTE — ASSESSMENT & PLAN NOTE
Admits to noncompliance with prograf  Mild transaminitis  GI consulted-following LFTs, tacrolimus level 3.7  LFTs improved  DC home

## 2020-11-13 NOTE — ASSESSMENT & PLAN NOTE
Reports compliance with prograf  Mild transaminitis, will trend  GI consulted-following LFTs, tacrolimus level

## 2020-11-13 NOTE — PLAN OF CARE
Patient A&Ox4. For discharge today. PIV removed. AVS printed and handed out to patient. Awaiting transport.

## 2020-11-13 NOTE — PROGRESS NOTES
[facility] Medicine  Progress Note    Patient Name: Vick Gonzalez  MRN: [id number]  Patient Class: OP- Observation   Admission Date: 11/11/2020  Length of Stay: 0 days  Attending Physician: Velasquez Edwards, *  Primary Care Provider: Emanuel Lopez MD        Subjective:     Principal Problem:<principal problem not specified>        HPI:  Mr. Gonzalez is a 63 yo male with a pertinent history of liver transplant (2011) on prograf, Hx Hep C (treated), pancreatitis, chronic pain, drug and alcohol abuse who presented to the ED this morning with c/o chills, body aches, and abdominal pain x 3 hrs PTA. He states he woke up around 3 am and had diarrhea then just didn't feel well. He reports pain to his epigastric area as well as left flank. He also reports pain across his lower back that is worse than usual. He has chronic back pain with a history of back surgery. He denies dysuria, chest pain, SOB, nausea, vomiting. He denies recent known sick contacts. He reports compliance with all of his medications including prograf. He denies complications from his liver transplant since 2011.    In the ED, he had an elevated lactate 2.7. His temp was elevated at 100, tachycardic 120. Elevated SED rate and CRP. CK 1297. He was started on zosyn and given fluids per sepsis protocol. The patient was admitted to [facility] hospital medicine service for further care.       Overview/Hospital Course:  No notes on file    Past Medical History:   Diagnosis Date    Anemia     Anxiety     Chronic pain syndrome 7/13/2011    CKD (chronic kidney disease), stage III     Diabetes mellitus type II, uncontrolled     Discitis of lumbosacral region 1/16/2015    ED (erectile dysfunction)     Encounter for blood transfusion     Genital herpes     Gout, arthritis     History of alcohol abuse     History of hepatitis C, s/p successful Rx w/ SVR24 (cure) - 5/2018 8/23/2011    Completed 24wks Epclusa + RBV w/SVR12  "- 2/2018  -     History of positive PPD, treatment status unknown     Pulmonary granulomas, negative sputum cultures for AFB and indeterminate quantferon test    History of substance abuse     Hypertension     Hypothyroidism     Liver replaced by transplant 8/23/2011    DATE: 12/16/2013  LIVER BIOPSY : REASON:  hep C staging  PATHOLOGY COMMITTEE NOTE/PLAN: :grade  1 / stage 1        Pancreatitis 2016    Peptic ulcer disease        Past Surgical History:   Procedure Laterality Date    CHOLECYSTECTOMY      INJECTION OF JOINT Right 12/2/2019    Procedure: INJECTION, JOINT SI;  Surgeon: Thu Penn MD;  Location: [facility] PAIN [facility]GT;  Service: Pain Management;  Laterality: Right;  RT SI JNT INJ    LIVER TRANSPLANT  06/2010    SPINE SURGERY         Review of patient's allergies indicates:  No Known Allergies    No current facility-administered medications on file prior to encounter.      Current Outpatient Medications on File Prior to Encounter   Medication Sig    allopurinoL (ZYLOPRIM) 100 MG tablet TAKE 1 TABLET BY MOUTH TWICE A DAY    BD ULTRA-FINE MENDY PEN NEEDLES 32 gauge x 5/32" Ndle     calcium carbonate-vitamin D3 (CALTRATE 600 + D) 600 mg(1,500mg) -400 unit Chew     dulaglutide (TRULICITY) 1.5 mg/0.5 mL pen injector Inject 1.5 mg (1 syringe) into the skin every 7 days.    gabapentin (NEURONTIN) 800 MG tablet Take 1 tablet (800 mg total) by mouth 3 (three) times daily.    insulin aspart U-100 (NOVOLOG FLEXPEN U-100 INSULIN) 100 unit/mL (3 mL) InPn pen Inject 20 Units into the skin 3 (three) times daily with meals.    insulin detemir U-100 (LEVEMIR FLEXTOUCH) 100 unit/mL (3 mL) SubQ InPn pen Inject 70 Units into the skin every evening. (Patient taking differently: Inject 80 Units into the skin every evening. )    lancets Misc 1 each by Misc.(Non-Drug; Combo Route) route 4 (four) times daily before meals and nightly.    levothyroxine (SYNTHROID) 88 MCG tablet Take 1 tablet (88 mcg total) by mouth " "once daily.    lisinopriL (PRINIVIL,ZESTRIL) 40 MG tablet TAKE 1 TABLET BY MOUTH EVERY DAY    metoprolol succinate (TOPROL-XL) 200 MG 24 hr tablet TAKE 1 TABLET (200 MG TOTAL) BY MOUTH ONCE DAILY.    multivitamin (THERAGRAN) per tablet Take 1 tablet by mouth.    NIFEdipine (ADALAT CC) 60 MG TbSR TAKE 1 TABLET BY MOUTH ONCE DAILY    NOVOFINE PLUS 32 gauge x 1/6" Ndle     rosuvastatin (CRESTOR) 5 MG tablet TAKE 1 TABLET BY MOUTH EVERY DAY    sertraline (ZOLOFT) 100 MG tablet TAKE 1 TABLET (100 MG TOTAL) BY MOUTH ONCE DAILY.    tacrolimus (PROGRAF) 1 MG Cap TAKE 2 CAPSULES (2 MG TOTAL) BY MOUTH IN THE MORNING & TAKE 1 CAPSULE (1 MG TOTAL) IN THE EVENING    traZODone (DESYREL) 50 MG tablet Take 1 tablet (50 mg total) by mouth every evening.    TRUE METRIX GLUCOSE METER Misc TEST 4 (FOUR) TIMES DAILY BEFORE MEALS AND NIGHTLY.    TRUE METRIX GLUCOSE TEST STRIP Strp USE 3 TIMES DAILY TO TEST BLOOD GLUCOSE LEVEL    colchicine 0.6 mg tablet Take 1 tablet (0.6 mg total) by mouth 2 (two) times daily.    diclofenac sodium (VOLTAREN) 1 % Gel Apply 2 g topically 4 (four) times daily as needed.    ondansetron (ZOFRAN-ODT) 4 MG TbDL Take 1 tablet (4 mg total) by mouth every 8 (eight) hours as needed (n/v).    pantoprazole (PROTONIX) 20 MG tablet Take 1 tablet (20 mg total) by mouth once daily.    pen needle, diabetic (BD ULTRA-FINE MENDY PEN NEEDLE) 32 gauge x 5/32" Ndle TEST WITH NOVOLOG THREE TIMES A DAY WITH MEALS AND WITH LEVEMIR AT BEDTIME    [DISCONTINUED] amitriptyline (ELAVIL) 50 MG tablet Take 1 tablet (50 mg total) by mouth every evening. For nerve pain and sleep    [DISCONTINUED] NIFEdipine (ADALAT CC) 60 MG TbSR TAKE 1 TABLET (60 MG TOTAL) BY MOUTH ONCE DAILY.    [DISCONTINUED] rosuvastatin (CRESTOR) 5 MG tablet Take 1 tablet (5 mg total) by mouth once daily.     Family History     Problem Relation (Age of Onset)    Cancer Mother, Maternal Uncle (82)    Diabetes Mother, Sister    Heart disease Mother "        Tobacco Use    Smoking status: Former Smoker    Smokeless tobacco: Never Used   Substance and Sexual Activity    Alcohol use: No     Comment: over 5 years ago, none currently    Drug use: Not Currently     Comment: Former cocaine use    Sexual activity: Yes     Review of Systems   Constitutional: Negative for appetite change, chills and fever.   HENT: Negative for congestion, sore throat and trouble swallowing.    Eyes: Negative for pain, redness and visual disturbance.   Respiratory: Negative for cough and shortness of breath.    Cardiovascular: Negative for chest pain and palpitations.   Gastrointestinal: Positive for abdominal pain. Negative for blood in stool, constipation, diarrhea, nausea and vomiting.   Genitourinary: Negative for difficulty urinating, dysuria and hematuria.   Musculoskeletal: Positive for back pain.   Skin: Negative for rash and wound.   Neurological: Negative for dizziness, weakness, light-headedness, numbness and headaches.   Psychiatric/Behavioral: Negative for confusion.     Objective:     Vital Signs (Most Recent):  Temp: 97.9 °F (36.6 °C) (11/12/20 1555)  Pulse: 78 (11/12/20 1555)  Resp: 16 (11/12/20 1805)  BP: (!) 163/94 (11/12/20 1722)  SpO2: 100 % (11/11/20 2159) Vital Signs (24h Range):  Temp:  [96.6 °F (35.9 °C)-98.1 °F (36.7 °C)] 97.9 °F (36.6 °C)  Pulse:  [77-89] 78  Resp:  [13-20] 16  SpO2:  [100 %] 100 %  BP: (132-164)/() 163/94     Weight: 121.4 kg (267 lb 10.2 oz)  Body mass index is 35.31 kg/m².    Physical Exam  Vitals signs and nursing note reviewed.   Constitutional:       General: He is not in acute distress.     Appearance: He is obese.   HENT:      Head: Normocephalic and atraumatic.      Mouth/Throat:      Mouth: Mucous membranes are moist.   Eyes:      Conjunctiva/sclera: Conjunctivae normal.   Neck:      Musculoskeletal: Neck supple.   Cardiovascular:      Rate and Rhythm: Normal rate and regular rhythm.      Pulses: Normal pulses.   Pulmonary:       Effort: Pulmonary effort is normal.      Breath sounds: Normal breath sounds.   Abdominal:      General: Bowel sounds are normal. There is no distension.      Palpations: Abdomen is soft.      Tenderness: There is abdominal tenderness.      Comments: obese   Musculoskeletal:      Right lower leg: Edema present.      Left lower leg: Edema present.   Skin:     General: Skin is warm and dry.   Neurological:      Mental Status: He is alert and oriented to person, place, and time.   Psychiatric:         Mood and Affect: Mood normal.         Behavior: Behavior normal.         Thought Content: Thought content normal.             Significant Labs:   A1C:   Recent Labs   Lab 09/01/20  0829 11/11/20  1553   HGBA1C 11.9* 9.1*     Blood Culture:   Recent Labs   Lab 11/11/20  0545   LABBLOO No Growth to date  No Growth to date  No Growth to date  No Growth to date     CBC:   Recent Labs   Lab 11/11/20  0551 11/12/20  0503   WBC 8.39 6.24   HGB 13.1* 11.9*   HCT 39.2* 36.5*   * 132*     CMP:   Recent Labs   Lab 11/11/20  0551 11/12/20  0503    137   K 4.2 4.1    104   CO2 22* 24   * 217*   BUN 16 15   CREATININE 1.4 1.3   CALCIUM 10.1 9.1   PROT 7.6 6.8   ALBUMIN 4.2 3.5   BILITOT 0.6 0.5   ALKPHOS 93 67   AST 90* 73*   ALT 70* 100*   ANIONGAP 13 9   EGFRNONAA 53* 58*     Lactic Acid:   Recent Labs   Lab 11/11/20  0551 11/11/20  0907   LACTATE 2.7* 2.3*     Magnesium:   Recent Labs   Lab 11/11/20  0551 11/12/20  0503   MG 1.1* 1.4*     POCT Glucose:   Recent Labs   Lab 11/12/20  0506 11/12/20  1148 11/12/20  1558   POCTGLUCOSE 177* 222* 145*     Troponin:   Recent Labs   Lab 11/11/20  0551   TROPONINI 0.007     TSH:   Recent Labs   Lab 11/11/20  0551   TSH 1.046     Urine Studies:   Recent Labs   Lab 11/11/20  0945   COLORU Yellow   APPEARANCEUA Clear   PHUR 5.0   SPECGRAV 1.015   PROTEINUA 1+*   GLUCUA Trace*   KETONESU 1+*   BILIRUBINUA Negative   OCCULTUA Trace*   NITRITE Negative   UROBILINOGEN  Negative   LEUKOCYTESUR Negative   RBCUA 0   WBCUA 1   BACTERIA None   SQUAMEPITHEL 2   HYALINECASTS 0       Significant Imaging: I have reviewed all pertinent imaging results/findings within the past 24 hours.      Assessment/Plan:      Right lower quadrant abdominal pain  Persistent c/o abdominal pain  CT abdomen negative  Eating well, no diarrhea-had normal BM   Cont PRN oxycodone  ambulation        Transaminitis  Mild elevation  Will trend, GI following  Stop IV fluids-patient eating and drinking  Hepatitis panel complete-history of Hep C - was treated      Sepsis  Unknown source-likely viral etiology but will treat due to immunocompromised state  CT abd no acute findings, UA negative, chest Xray negative, BC NGTD  Cont zosyn  Stop IV fluids-patient eating and drinking-no diarrhea  Vitals stable-no fevers  Will switch to PO antibiotics in the morning      BPH with urinary obstruction  Not on any home meds for this  Denies issues with urination      PAD (peripheral artery disease)            CKD (chronic kidney disease), stage III    Creat 1.4 on admit, better than it has been on prior admits  Monitor      S/P liver transplant  Reports compliance with prograf  Mild transaminitis, will trend  GI consulted-following LFTs, tacrolimus level      Diabetes mellitus type II, uncontrolled  Glucose 200s  Check A1C  Start detemir nightly  Prandial insulin and SSI with accuchecks  Diabetic diet      Essential hypertension  BP elevated on admit  Has been controlled since arrival  Cont home nifedipine and metoprolol  Control pain      Gout, unspecified  Cont allopurinol      Hypothyroidism  Cont symthroid      Chronic pain syndrome  Lumbar radiculopathy    Reports back pain is worse than normal  Cont neurontin  Stop dilaudid-start oxycodone IR PRN  Start lidocaine patch to back        VTE Risk Mitigation (From admission, onward)         Ordered     enoxaparin injection 40 mg  Every 24 hours      11/11/20 1248     IP VTE HIGH  RISK PATIENT  Once      11/11/20 1248     Place sequential compression device  Until discontinued      11/11/20 1248                Discharge Planning   JOJO:      Code Status: Full Code   Is the patient medically ready for discharge?:     Reason for patient still in hospital (select all that apply): Patient trending condition and Treatment  Discharge Plan A: Home                  Kamini Cueva NP  Department of Hospital Medicine   [facility]

## 2020-11-13 NOTE — PLAN OF CARE
11/13/20 1532   Final Note   Assessment Type Discharge Planning Assessment   Anticipated Discharge Disposition Home   What phone number can be called within the next 1-3 days to see how you are doing after discharge? [phone]   Hospital Follow Up  Appt(s) scheduled?   (Dr. Lopez's office to conatct patient with hospital follow up appointment.)   Right Care Referral Info   Post Acute Recommendation Other   Referral Type None       Future Appointments   Date Time Provider Department Center   11/19/2020  9:30 AM Emanuel Lopez MD [facility]   1/25/2021 10:15 AM [facility]

## 2020-11-13 NOTE — ASSESSMENT & PLAN NOTE
Lumbar radiculopathy    Reports back pain is worse than normal  Cont neurontin  Stop dilaudid-start oxycodone IR PRN  Start lidocaine patch to back

## 2020-11-15 NOTE — PLAN OF CARE
Patient has received discharge instructions. Prescriptions received. Instructions reviewed with pt using teachback method. All questions answered to pt satisfaction. IV access removed per floor nurse. Transport will be requested for discharge on arrival of ride

## 2020-11-16 LAB
BACTERIA BLD CULT: NORMAL
BACTERIA BLD CULT: NORMAL

## 2020-11-19 ENCOUNTER — OFFICE VISIT (OUTPATIENT)
Dept: FAMILY MEDICINE | Facility: CLINIC | Age: 65
End: 2020-11-19
Payer: MEDICARE

## 2020-11-19 VITALS
OXYGEN SATURATION: 98 % | SYSTOLIC BLOOD PRESSURE: 130 MMHG | HEART RATE: 82 BPM | HEIGHT: 74 IN | BODY MASS INDEX: 34.83 KG/M2 | WEIGHT: 271.38 LBS | TEMPERATURE: 98 F | DIASTOLIC BLOOD PRESSURE: 72 MMHG

## 2020-11-19 DIAGNOSIS — D84.9 IMMUNOSUPPRESSION: ICD-10-CM

## 2020-11-19 DIAGNOSIS — R19.7 DIARRHEA, UNSPECIFIED TYPE: Primary | ICD-10-CM

## 2020-11-19 DIAGNOSIS — Z94.4 LIVER TRANSPLANT RECIPIENT: ICD-10-CM

## 2020-11-19 DIAGNOSIS — R74.8 ELEVATED CPK: ICD-10-CM

## 2020-11-19 DIAGNOSIS — N18.30 STAGE 3 CHRONIC KIDNEY DISEASE, UNSPECIFIED WHETHER STAGE 3A OR 3B CKD: ICD-10-CM

## 2020-11-19 DIAGNOSIS — M79.10 MYALGIA: ICD-10-CM

## 2020-11-19 PROCEDURE — 99499 RISK ADDL DX/OHS AUDIT: ICD-10-PCS | Mod: HCNC,S$GLB,, | Performed by: FAMILY MEDICINE

## 2020-11-19 PROCEDURE — 99215 PR OFFICE/OUTPT VISIT, EST, LEVL V, 40-54 MIN: ICD-10-PCS | Mod: S$GLB,,, | Performed by: FAMILY MEDICINE

## 2020-11-19 PROCEDURE — 3008F PR BODY MASS INDEX (BMI) DOCUMENTED: ICD-10-PCS | Mod: CPTII,S$GLB,, | Performed by: FAMILY MEDICINE

## 2020-11-19 PROCEDURE — 99499 UNLISTED E&M SERVICE: CPT | Mod: HCNC,S$GLB,, | Performed by: FAMILY MEDICINE

## 2020-11-19 PROCEDURE — 99999 PR PBB SHADOW E&M-EST. PATIENT-LVL III: ICD-10-PCS | Mod: PBBFAC,,, | Performed by: FAMILY MEDICINE

## 2020-11-19 PROCEDURE — 3078F PR MOST RECENT DIASTOLIC BLOOD PRESSURE < 80 MM HG: ICD-10-PCS | Mod: CPTII,S$GLB,, | Performed by: FAMILY MEDICINE

## 2020-11-19 PROCEDURE — 99999 PR PBB SHADOW E&M-EST. PATIENT-LVL III: CPT | Mod: PBBFAC,,, | Performed by: FAMILY MEDICINE

## 2020-11-19 PROCEDURE — 3075F PR MOST RECENT SYSTOLIC BLOOD PRESS GE 130-139MM HG: ICD-10-PCS | Mod: CPTII,S$GLB,, | Performed by: FAMILY MEDICINE

## 2020-11-19 PROCEDURE — 3008F BODY MASS INDEX DOCD: CPT | Mod: CPTII,S$GLB,, | Performed by: FAMILY MEDICINE

## 2020-11-19 PROCEDURE — 3046F HEMOGLOBIN A1C LEVEL >9.0%: CPT | Mod: CPTII,S$GLB,, | Performed by: FAMILY MEDICINE

## 2020-11-19 PROCEDURE — 3078F DIAST BP <80 MM HG: CPT | Mod: CPTII,S$GLB,, | Performed by: FAMILY MEDICINE

## 2020-11-19 PROCEDURE — 99215 OFFICE O/P EST HI 40 MIN: CPT | Mod: S$GLB,,, | Performed by: FAMILY MEDICINE

## 2020-11-19 PROCEDURE — 3075F SYST BP GE 130 - 139MM HG: CPT | Mod: CPTII,S$GLB,, | Performed by: FAMILY MEDICINE

## 2020-11-19 PROCEDURE — 3046F PR MOST RECENT HEMOGLOBIN A1C LEVEL > 9.0%: ICD-10-PCS | Mod: CPTII,S$GLB,, | Performed by: FAMILY MEDICINE

## 2020-11-19 NOTE — PROGRESS NOTES
(Portions of this note were dictated using voice recognition software and may contain dictation related errors in spelling/grammar/syntax not found on text review)    CC:   Chief Complaint   Patient presents with    Hospital Follow Up       HPI: 64 y.o. male Last visit 11/02/2020.  Recently  hospitalized for weakness and diarrhea.  CoVID and flu negative .  elevated lactate.  Was treated on sepsis protocol with Zosyn and with fluids.  Tacrolimus low.  Admitted noncompliance with his immunosuppressive regimen for his liver transplant.  States that he has been missing his p.m. dose of tacrolimus.  LFTs mildly elevated as below, CPK elevated.  CT abdomen unremarkable except for some soft tissue swelling anterior abdominal wall, question cellulitis although not clear.  Abdominal ultrasound showed hepatomegaly and steatosis.  Blood cultures negative.  Urine showed trace occult blood, 1+ ketones, trace glucose, 1+ protein, negative leukocytes and nitrites.  Denies any recent drug use, see below    Interval history:  Currently feels well.  Denies any myalgias or diarrhea.  No vomiting.  No fever.  No other respiratory symptoms.  Denies any hematuria.  No blood in the stool.  Agrees to start taking his tacrolimus more regularly as directed.    Coexisting medical history including    Diabetes with peripheral neuropathy:  On Levemir 70 units daily, NovoLog 20 units with meals.  Trulicity currently at 1.5 mg weekly.   admits to dietary noncompliance    Hypertension on lisinopril 40 mg daily, metoprolol 200 mg daily, nifedipine 60 mg daily.     Dyslipidemia on Crestor 5mg daily.     Hypothyroidism:  Synthroid 88mcg.       Anxiety on Zoloft 100 mg daily.       Liver Transplant secondary to end-stage liver disease hepatitis C and prior alcohol abuse.:  Followed by hepatology.  On Prograf.   fibroscan showed stage II fibrosis     Chronic pain, followed by pain management.  Was On oxycodone along with his abapentin.  However  failed drug test with positive cocaine and Soma.  History of cocaine abuse, as of last visit had mentioned last use 2 months prior although states  he had handled some cocaine left on counter by his nephew prior to the failed drug test, along with his wife having put 1 of her Soma pills in his medicine box but denies any chronic use of this.  Currently off of opiates for pain control, pain management had recommended continuing with interventional management if desired.    Past Medical History:   Diagnosis Date    Anemia     Anxiety     Chronic pain syndrome 7/13/2011    CKD (chronic kidney disease), stage III     Diabetes mellitus type II, uncontrolled     Discitis of lumbosacral region 1/16/2015    ED (erectile dysfunction)     Encounter for blood transfusion     Genital herpes     Gout, arthritis     History of alcohol abuse     History of hepatitis C, s/p successful Rx w/ SVR24 (cure) - 5/2018 8/23/2011    Completed 24wks Epclusa + RBV w/SVR12 - 2/2018  -     History of positive PPD, treatment status unknown     Pulmonary granulomas, negative sputum cultures for AFB and indeterminate quantferon test    History of substance abuse     Hypertension     Hypothyroidism     Liver replaced by transplant 8/23/2011    DATE: 12/16/2013  LIVER BIOPSY : REASON:  hep C staging  PATHOLOGY COMMITTEE NOTE/PLAN: :grade  1 / stage 1        Pancreatitis 2016    Peptic ulcer disease        Past Surgical History:   Procedure Laterality Date    CHOLECYSTECTOMY      INJECTION OF JOINT Right 12/2/2019    Procedure: INJECTION, JOINT SI;  Surgeon: Thu Penn MD;  Location: [facility] PAIN MGT;  Service: Pain Management;  Laterality: Right;  RT SI JNT INJ    LIVER TRANSPLANT  06/2010    SPINE SURGERY         Family History   Problem Relation Age of Onset    Cancer Mother     Diabetes Mother     Heart disease Mother     Diabetes Sister     Cancer Maternal Uncle 82        colon CA    Melanoma Neg Hx     Psoriasis  Neg Hx     Lupus Neg Hx     Eczema Neg Hx     Acne Neg Hx        Social History     Tobacco Use    Smoking status: Former Smoker    Smokeless tobacco: Never Used   Substance Use Topics    Alcohol use: No     Comment: over 5 years ago, none currently    Drug use: Not Currently     Comment: Former cocaine use       Lab Results   Component Value Date    WBC 6.38 11/13/2020    HGB 12.5 (L) 11/13/2020    HCT 38.6 (L) 11/13/2020    MCV 86 11/13/2020     (L) 11/13/2020    CHOL 172 09/01/2020    TRIG 378 (H) 09/01/2020    HDL 26 (L) 09/01/2020    ALT 80 (H) 11/13/2020    AST 40 11/13/2020    BILITOT 0.6 11/13/2020    ALKPHOS 62 11/13/2020     11/13/2020    K 4.1 11/13/2020     11/13/2020    CREATININE 1.3 11/13/2020    ESTGFRAFRICA >60 11/13/2020    EGFRNONAA 58 (A) 11/13/2020    CALCIUM 9.3 11/13/2020    ALBUMIN 3.6 11/13/2020    BUN 13 11/13/2020    CO2 25 11/13/2020    TSH 1.046 11/11/2020    PSA 0.18 09/01/2020    PSADIAG 0.28 10/09/2017    INR 1.0 07/10/2019    HGBA1C 9.1 (H) 11/11/2020    MICALBCREAT 186.1 (H) 03/27/2017    LDLCALC 70.4 09/01/2020     (H) 11/13/2020       Hemoglobin (g/dL)   Date Value   11/13/2020 12.5 (L)   11/12/2020 11.9 (L)   11/11/2020 13.1 (L)   10/27/2020 13.2 (L)   07/27/2020 14.6   05/04/2020 14.2     Hemoglobin A1C (%)   Date Value   11/11/2020 9.1 (H)   09/01/2020 11.9 (H)   01/23/2020 8.7 (H)   10/04/2019 7.4 (H)   06/03/2019 9.2 (H)   02/25/2019 9.9 (H)     eGFR if African American (mL/min/1.73 m^2)   Date Value   11/13/2020 >60   11/12/2020 >60   11/11/2020 >60   10/27/2020 52.6 (A)   09/01/2020 49 (A)   07/27/2020 52.6 (A)     ALT (U/L)   Date Value   11/13/2020 80 (H)   11/12/2020 100 (H)   11/11/2020 70 (H)   10/27/2020 27   09/01/2020 30   07/27/2020 25     AST (U/L)   Date Value   11/13/2020 40   11/12/2020 73 (H)   11/11/2020 90 (H)   10/27/2020 19   09/01/2020 29   07/27/2020 16     Tacrolimus Lvl (ng/mL)   Date Value   11/12/2020 3.7 (L)    10/27/2020 4.8 (L)   07/27/2020 3.9 (L)   05/04/2020 4.6 (L)   01/06/2020 4.7 (L)   09/25/2019 6.0     CPK (U/L)   Date Value   11/11/2020 1247 (H)   06/09/2013 219 (H)   01/18/2008 772 (HH)   01/16/2008 917 (HH)   10/03/2006 972 (HH)   10/03/2006 993 (HH)               ROS:  GENERAL: No fever, chills, fatigability or weight loss.  SKIN: No rashes, no itching.  HEAD: No headaches.  EYES: No visual changes  EARS: No ear pain or changes in hearing.  NOSE: No congestion or rhinorrhea.  MOUTH & THROAT: No hoarseness, change in voice, or sore throat.  NODES: Denies swollen glands.  CHEST: Denies NARAYANAN, cyanosis, wheezing, cough and sputum production.  CARDIOVASCULAR: Denies chest pain, PND, orthopnea.  ABDOMEN: No nausea, vomiting, or changes in bowel function.  URINARY: No flank pain, dysuria or hematuria.  PERIPHERAL VASCULAR: No claudication or cyanosis.  MUSCULOSKELETAL:  Above.  NEUROLOGIC: No acute symptoms.    Vital signs reviewed  PE:   APPEARANCE: Well nourished, well developed, in no acute distress.    HEAD: Normocephalic, atraumatic.  EYES:     Conjunctivae noninjected.  NECK: Supple with no cervical lymphadenopathy.  No carotid bruits.  No thyromegaly  CHEST: Good inspiratory effort. Lungs clear to auscultation with no wheezes or crackles.  CARDIOVASCULAR: Normal S1, S2. No rubs, murmurs, or gallops.  ABDOMEN: Bowel sounds normal.  Chronic abdominal distension, areas of induration where he injects himself with insulin.  Nontender.  EXTREMITIES:  1 Plus edema BLE        IMPRESSION  1. Diarrhea, unspecified type    2. Elevated CPK    3. Stage 3 chronic kidney disease, unspecified whether stage 3a or 3b CKD    4. Liver transplant recipient    5. Myalgia    6. Uncontrolled type 2 diabetes mellitus with diabetic neuropathy, with long-term current use of insulin    7. Immunosuppression            PLAN  Reviewed hospitalization summary, imaging, labs as above    Diarrhea and body aches resolved, presumptive  diagnosis is viral infection.  Had negative CoVID and flu testing.    Elevated CPK to 1200 in hospital, recheck along with urine testing.  Does have several months prior history of cocaine abuse, denies any significant recurrence of this.  Given elevated CPK along with development of myalgias and diarrhea, will recheck U tox    On tacrolimus, has not been adherent to full dosing.  Advise absolute importance of adherence to tacrolimus dosing to prevent rejection of his liver transplant    Orders Placed This Encounter   Procedures    CBC Auto Differential    Comprehensive Metabolic Panel    CK    Urinalysis    TOXICOLOGY SCREEN, URINE, RANDOM (COMPLIANCE)

## 2020-11-20 ENCOUNTER — LAB VISIT (OUTPATIENT)
Dept: LAB | Facility: HOSPITAL | Age: 65
End: 2020-11-20
Attending: FAMILY MEDICINE
Payer: MEDICARE

## 2020-11-20 DIAGNOSIS — R19.7 DIARRHEA, UNSPECIFIED TYPE: ICD-10-CM

## 2020-11-20 DIAGNOSIS — N18.30 STAGE 3 CHRONIC KIDNEY DISEASE, UNSPECIFIED WHETHER STAGE 3A OR 3B CKD: ICD-10-CM

## 2020-11-20 DIAGNOSIS — Z94.4 LIVER TRANSPLANT RECIPIENT: ICD-10-CM

## 2020-11-20 DIAGNOSIS — M79.10 MYALGIA: ICD-10-CM

## 2020-11-20 DIAGNOSIS — R74.8 ELEVATED CPK: ICD-10-CM

## 2020-11-20 LAB
BACTERIA #/AREA URNS AUTO: NORMAL /HPF
BILIRUB UR QL STRIP: NEGATIVE
CLARITY UR REFRACT.AUTO: CLEAR
COLOR UR AUTO: YELLOW
GLUCOSE UR QL STRIP: NEGATIVE
HGB UR QL STRIP: NEGATIVE
HYALINE CASTS UR QL AUTO: 0 /LPF
KETONES UR QL STRIP: NEGATIVE
LEUKOCYTE ESTERASE UR QL STRIP: NEGATIVE
MICROSCOPIC COMMENT: NORMAL
NITRITE UR QL STRIP: NEGATIVE
PH UR STRIP: 5 [PH] (ref 5–8)
PROT UR QL STRIP: ABNORMAL
RBC #/AREA URNS AUTO: 0 /HPF (ref 0–4)
SP GR UR STRIP: 1.02 (ref 1–1.03)
SQUAMOUS #/AREA URNS AUTO: 1 /HPF
URN SPEC COLLECT METH UR: ABNORMAL
WBC #/AREA URNS AUTO: 4 /HPF (ref 0–5)

## 2020-11-20 PROCEDURE — 81001 URINALYSIS AUTO W/SCOPE: CPT

## 2020-11-23 ENCOUNTER — TELEPHONE (OUTPATIENT)
Dept: FAMILY MEDICINE | Facility: CLINIC | Age: 65
End: 2020-11-23

## 2020-11-23 ENCOUNTER — PES CALL (OUTPATIENT)
Dept: ADMINISTRATIVE | Facility: CLINIC | Age: 65
End: 2020-11-23

## 2020-11-23 DIAGNOSIS — R74.8 ELEVATED CPK: Primary | ICD-10-CM

## 2020-11-23 NOTE — TELEPHONE ENCOUNTER
----- Message from Paige Louise sent at 11/23/2020  2:27 PM CST -----  Type:  Patient Returning Call    Who Called: Vick   Who Left Message for Patient: no message   Does the patient know what this is regarding?: no   Would the patient rather a call back or a response via [employer] [facility]?  Call back   Best Call Back Number: [phone]   Additional Information:

## 2020-11-23 NOTE — TELEPHONE ENCOUNTER
Also please inform patient that his muscle enzyme test has improved.  Kidney function is stable.  Liver function is stable.

## 2020-11-23 NOTE — TELEPHONE ENCOUNTER
Please check with lab.  I ordered urine on patient.  Ordered U tox as well but it looks like it may have been entered in as clinic collect not lab collect.  Not sure if they can add this to the lab that was done

## 2020-11-30 ENCOUNTER — TELEPHONE (OUTPATIENT)
Dept: PAIN MEDICINE | Facility: CLINIC | Age: 65
End: 2020-11-30

## 2020-11-30 NOTE — TELEPHONE ENCOUNTER
----- Message from Symone Dilma sent at 11/30/2020  2:47 PM CST -----  Who Called: VIRGIL GR is the reqeust in detail: Patient would like to speak with Emma. He did not want to disclose any info pertaining to call beyond telling me it was an emergency, and it was something they talked about previously. I advised patient more info was needed, but he refused. Please adviseCan the clinic reply by [facility]?: Kevin Call Back Number: [phone]

## 2020-11-30 NOTE — TELEPHONE ENCOUNTER
----- Message from Loretta Tate sent at 11/30/2020 10:02 AM CST -----  Name of Who is Calling: VIRGIL GR  What is the request in detail: The patient is calling to speak to the nurse in regards to some general questions the patient have. Please advise Can the clinic reply by [facility]: No What Number to Call Back if not in [facility]: [phone]

## 2020-11-30 NOTE — TELEPHONE ENCOUNTER
Staff returned call to patient in regards to his message.    Staff informed patient that provider is still in clinic and his previous message was presented to provider to be contacted    Pt verbalized understanding.

## 2020-12-01 ENCOUNTER — OFFICE VISIT (OUTPATIENT)
Dept: FAMILY MEDICINE | Facility: CLINIC | Age: 65
End: 2020-12-01
Payer: MEDICARE

## 2020-12-01 ENCOUNTER — CLINICAL SUPPORT (OUTPATIENT)
Dept: FAMILY MEDICINE | Facility: CLINIC | Age: 65
End: 2020-12-01
Attending: FAMILY MEDICINE
Payer: MEDICARE

## 2020-12-01 VITALS
WEIGHT: 273.38 LBS | RESPIRATION RATE: 18 BRPM | OXYGEN SATURATION: 98 % | BODY MASS INDEX: 36.23 KG/M2 | SYSTOLIC BLOOD PRESSURE: 140 MMHG | HEART RATE: 88 BPM | TEMPERATURE: 97 F | HEIGHT: 73 IN | DIASTOLIC BLOOD PRESSURE: 80 MMHG

## 2020-12-01 DIAGNOSIS — J84.10 CALCIFIED GRANULOMA OF LUNG: ICD-10-CM

## 2020-12-01 DIAGNOSIS — M10.9 GOUT, UNSPECIFIED CAUSE, UNSPECIFIED CHRONICITY, UNSPECIFIED SITE: ICD-10-CM

## 2020-12-01 DIAGNOSIS — N52.1 ERECTILE DYSFUNCTION ASSOCIATED WITH TYPE 2 DIABETES MELLITUS: ICD-10-CM

## 2020-12-01 DIAGNOSIS — Z00.00 ENCOUNTER FOR PREVENTIVE HEALTH EXAMINATION: Primary | ICD-10-CM

## 2020-12-01 DIAGNOSIS — F41.9 ANXIETY: ICD-10-CM

## 2020-12-01 DIAGNOSIS — E66.01 SEVERE OBESITY (BMI 35.0-39.9) WITH COMORBIDITY: ICD-10-CM

## 2020-12-01 DIAGNOSIS — I70.0 AORTIC ATHEROSCLEROSIS: ICD-10-CM

## 2020-12-01 DIAGNOSIS — E78.5 HYPERLIPIDEMIA ASSOCIATED WITH TYPE 2 DIABETES MELLITUS: ICD-10-CM

## 2020-12-01 DIAGNOSIS — E03.9 ACQUIRED HYPOTHYROIDISM: ICD-10-CM

## 2020-12-01 DIAGNOSIS — E11.69 ERECTILE DYSFUNCTION ASSOCIATED WITH TYPE 2 DIABETES MELLITUS: ICD-10-CM

## 2020-12-01 DIAGNOSIS — E11.69 HYPERLIPIDEMIA ASSOCIATED WITH TYPE 2 DIABETES MELLITUS: ICD-10-CM

## 2020-12-01 DIAGNOSIS — Z94.4 S/P LIVER TRANSPLANT: ICD-10-CM

## 2020-12-01 DIAGNOSIS — I73.9 CLAUDICATION IN PERIPHERAL VASCULAR DISEASE: ICD-10-CM

## 2020-12-01 DIAGNOSIS — E11.65 UNCONTROLLED TYPE 2 DIABETES MELLITUS WITH HYPERGLYCEMIA: ICD-10-CM

## 2020-12-01 DIAGNOSIS — E11.59 HYPERTENSION ASSOCIATED WITH DIABETES: ICD-10-CM

## 2020-12-01 DIAGNOSIS — E11.42 DIABETIC POLYNEUROPATHY ASSOCIATED WITH TYPE 2 DIABETES MELLITUS: ICD-10-CM

## 2020-12-01 DIAGNOSIS — I15.2 HYPERTENSION ASSOCIATED WITH DIABETES: ICD-10-CM

## 2020-12-01 DIAGNOSIS — K56.609 SBO (SMALL BOWEL OBSTRUCTION): ICD-10-CM

## 2020-12-01 DIAGNOSIS — D84.9 IMMUNOSUPPRESSED STATUS: ICD-10-CM

## 2020-12-01 DIAGNOSIS — E11.9 DIABETES MELLITUS WITHOUT COMPLICATION: ICD-10-CM

## 2020-12-01 PROCEDURE — 99999 PR PBB SHADOW E&M-EST. PATIENT-LVL V: CPT | Mod: PBBFAC,,, | Performed by: NURSE PRACTITIONER

## 2020-12-01 PROCEDURE — 99999 PR PBB SHADOW E&M-EST. PATIENT-LVL V: ICD-10-PCS | Mod: PBBFAC,,, | Performed by: NURSE PRACTITIONER

## 2020-12-01 PROCEDURE — 3077F PR MOST RECENT SYSTOLIC BLOOD PRESSURE >= 140 MM HG: ICD-10-PCS | Mod: CPTII,S$GLB,, | Performed by: NURSE PRACTITIONER

## 2020-12-01 PROCEDURE — 3046F HEMOGLOBIN A1C LEVEL >9.0%: CPT | Mod: CPTII,S$GLB,, | Performed by: NURSE PRACTITIONER

## 2020-12-01 PROCEDURE — 3077F SYST BP >= 140 MM HG: CPT | Mod: CPTII,S$GLB,, | Performed by: NURSE PRACTITIONER

## 2020-12-01 PROCEDURE — 3008F BODY MASS INDEX DOCD: CPT | Mod: CPTII,S$GLB,, | Performed by: NURSE PRACTITIONER

## 2020-12-01 PROCEDURE — 99499 UNLISTED E&M SERVICE: CPT | Mod: HCNC,S$GLB,, | Performed by: NURSE PRACTITIONER

## 2020-12-01 PROCEDURE — 92250 DIABETIC EYE SCREENING PHOTO: ICD-10-PCS | Mod: 26,S$GLB,, | Performed by: OPTOMETRIST

## 2020-12-01 PROCEDURE — 99499 RISK ADDL DX/OHS AUDIT: ICD-10-PCS | Mod: HCNC,S$GLB,, | Performed by: NURSE PRACTITIONER

## 2020-12-01 PROCEDURE — 3079F PR MOST RECENT DIASTOLIC BLOOD PRESSURE 80-89 MM HG: ICD-10-PCS | Mod: CPTII,S$GLB,, | Performed by: NURSE PRACTITIONER

## 2020-12-01 PROCEDURE — G0439 PR MEDICARE ANNUAL WELLNESS SUBSEQUENT VISIT: ICD-10-PCS | Mod: S$GLB,,, | Performed by: NURSE PRACTITIONER

## 2020-12-01 PROCEDURE — 3046F PR MOST RECENT HEMOGLOBIN A1C LEVEL > 9.0%: ICD-10-PCS | Mod: CPTII,S$GLB,, | Performed by: NURSE PRACTITIONER

## 2020-12-01 PROCEDURE — 3008F PR BODY MASS INDEX (BMI) DOCUMENTED: ICD-10-PCS | Mod: CPTII,S$GLB,, | Performed by: NURSE PRACTITIONER

## 2020-12-01 PROCEDURE — 3079F DIAST BP 80-89 MM HG: CPT | Mod: CPTII,S$GLB,, | Performed by: NURSE PRACTITIONER

## 2020-12-01 PROCEDURE — G0439 PPPS, SUBSEQ VISIT: HCPCS | Mod: S$GLB,,, | Performed by: NURSE PRACTITIONER

## 2020-12-01 PROCEDURE — 92250 FUNDUS PHOTOGRAPHY W/I&R: CPT | Mod: 26,S$GLB,, | Performed by: OPTOMETRIST

## 2020-12-01 NOTE — PROGRESS NOTES
Vick Gonzalez is a 64 y.o. male here for a diabetic eye screening with non-dilated fundus photos per Christi Wang NP.    Patient cooperative?: Yes  Small pupils?: Yes  Last eye exam: 2019    For exam results, see Encounter Report.

## 2020-12-01 NOTE — PROGRESS NOTES
"  Vick Gonzalez presented for a  Medicare AWV and comprehensive Health Risk Assessment today. The following components were reviewed and updated:    · Medical history  · Family History  · Social history  · Allergies and Current Medications  · Health Risk Assessment  · Health Maintenance  · Care Team         ** See Completed Assessments for Annual Wellness Visit within the encounter summary.**         The following assessments were completed:  · Living Situation  · CAGE  · Depression Screening  · Timed Get Up and Go  · Whisper Test  · Cognitive Function Screening  · Nutrition Screening  · ADL Screening  · PAQ Screening        Vitals:    12/01/20 1608   BP: (!) 140/80   BP Location: Left arm   Patient Position: Sitting   BP Method: Large (Manual)   Pulse: 88   Resp: 18   Temp: 97.3 °F (36.3 °C)   TempSrc: Oral   SpO2: 98%   Weight: 124 kg (273 lb 5.9 oz)   Height: 6' 1" (1.854 m)     Body mass index is 36.07 kg/m².     Physical Exam  Constitutional:       General: He is not in acute distress.     Appearance: Normal appearance. He is obese.   HENT:      Head: Normocephalic and atraumatic.   Pulmonary:      Effort: No respiratory distress.   Skin:     Coloration: Skin is not pale.   Neurological:      Mental Status: He is alert and oriented to person, place, and time.   Psychiatric:         Mood and Affect: Mood normal.         Behavior: Behavior normal.         Judgment: Judgment normal.           Diagnoses and health risks identified today and associated recommendations/orders:    1. Encounter for preventive health examination    2. Uncontrolled type 2 diabetes mellitus with hyperglycemia  Chronic; stable on medication.  Followed by PCP.    3. Hypertension associated with diabetes  Chronic; stable on medication.  Followed by PCP.    4. Hyperlipidemia associated with type 2 diabetes mellitus  Chronic; stable on medication.  Followed by PCP.    5. Aortic atherosclerosis  Chronic; stable.  As seen on imaging dated " 11/16/10.  Followed by PCP.    6. Calcified granuloma of lung  Chronic; stable.  As seen on imaging dated 10/22/10.  Followed by PCP.    7. Diabetic polyneuropathy associated with type 2 diabetes mellitus  Chronic; stable on medication.  Followed by PCP.  - Ambulatory referral/consult to Podiatry; Future    8. Claudication in peripheral vascular disease  Chronic; stable.  Followed by PCP.  - Ambulatory referral/consult to Podiatry; Future    9. Erectile dysfunction associated with type 2 diabetes mellitus  Chronic; stable on medication.  Followed by Urology.    10. Anxiety  Chronic; stable on medication.  Followed by PCP.    11. SBO (small bowel obstruction)  Chronic; stable.  Followed by PCP.    12. S/P liver transplant  Chronic; stable on medication.  Followed by Transplant.    13. Immunosuppressed status  Chronic; stable.  Followed by Transplant.    14. Acquired hypothyroidism  Chronic; stable on medication.  Followed by PCP.    15. Gout, unspecified cause, unspecified chronicity, unspecified site  Chronic; stable on medication.  Followed by PCP.    16. Severe obesity (BMI 35.0-39.9) with comorbidity  Chronic, stable. Therapeutic lifestyle changes discussed. Followed by PCP.      Provided Vick with a 5-10 year written screening schedule and personal prevention plan. Recommendations were developed using the USPSTF age appropriate recommendations. Education, counseling, and referrals were provided as needed. After Visit Summary printed and given to patient which includes a list of additional screenings\tests needed.    Follow up for Annual Wellness Visit in 1 year.    Christi Wang NP         I offered to discuss end of life issues, including information on how to make advance directives that the patient could use to name someone who would make medical decisions on their behalf if they became too ill to make themselves.    ___Patient declined  _X_Patient is interested, I provided paper work and offered to  discuss.

## 2020-12-01 NOTE — Clinical Note
No good view of right eye. Moderate images of Left eye reveal no diabetic retinopathy. Advise dilated fundus exam in 2-4 months to evaluate.

## 2020-12-01 NOTE — PATIENT INSTRUCTIONS
Counseling and Referral of Other Preventative  (Italic type indicates deductible and co-insurance are waived)    Patient Name: Vick Gonzalez  Today's Date: 12/1/2020    Health Maintenance       Date Due Completion Date    Foot Exam 10/08/2020 10/8/2019    Eye Exam 10/29/2020 10/29/2019    Hemoglobin A1c 02/11/2021 11/11/2020    Lipid Panel 09/01/2021 9/1/2020    Colorectal Cancer Screening 07/10/2025 7/10/2015    TETANUS VACCINE 10/29/2025 10/29/2015 (Done)    Override on 10/29/2015: Done        Orders Placed This Encounter   Procedures    Ambulatory referral/consult to Podiatry     The following information is provided to all patients.  This information is to help you find resources for any of the problems found today that may be affecting your health:                Living healthy guide: www.[facility].[place].[facility]      Understanding Diabetes: www.diabetes.org      Eating healthy: www.cdc.gov/healthyweight      CDC home safety checklist: www.cdc.gov/steadi/patient.html      Agency on Aging: www.goea.[place].[facility]      Alcoholics anonymous (AA): www.aa.org      Physical Activity: www.yazmin.nih.gov/hk5mmfq      Tobacco use: www.quitwithusla.org

## 2020-12-03 ENCOUNTER — TELEPHONE (OUTPATIENT)
Dept: FAMILY MEDICINE | Facility: CLINIC | Age: 65
End: 2020-12-03

## 2020-12-03 DIAGNOSIS — E11.9 DIABETES MELLITUS WITHOUT COMPLICATION: Primary | ICD-10-CM

## 2020-12-03 NOTE — TELEPHONE ENCOUNTER
----- Message from Ashlyn Kevin sent at 12/3/2020  1:38 PM CST -----  Is patient aware of referral to Optometry?

## 2020-12-03 NOTE — TELEPHONE ENCOUNTER
Left message requesting a callback.  Diabetic eye cam results not clear.  Dr Lopez referring to opthomology.

## 2020-12-04 ENCOUNTER — TELEPHONE (OUTPATIENT)
Dept: FAMILY MEDICINE | Facility: CLINIC | Age: 65
End: 2020-12-04

## 2020-12-08 ENCOUNTER — OFFICE VISIT (OUTPATIENT)
Dept: OPTOMETRY | Facility: CLINIC | Age: 65
End: 2020-12-08
Payer: MEDICARE

## 2020-12-08 DIAGNOSIS — E11.9 TYPE 2 DIABETES MELLITUS WITHOUT RETINOPATHY: Primary | ICD-10-CM

## 2020-12-08 DIAGNOSIS — H52.4 PRESBYOPIA: ICD-10-CM

## 2020-12-08 DIAGNOSIS — Z13.5 GLAUCOMA SCREENING: ICD-10-CM

## 2020-12-08 DIAGNOSIS — H25.13 NUCLEAR SCLEROSIS, BILATERAL: ICD-10-CM

## 2020-12-08 DIAGNOSIS — E11.9 DIABETES MELLITUS WITHOUT COMPLICATION: ICD-10-CM

## 2020-12-08 PROCEDURE — 92015 PR REFRACTION: ICD-10-PCS | Mod: S$GLB,,, | Performed by: OPTOMETRIST

## 2020-12-08 PROCEDURE — 92014 PR EYE EXAM, EST PATIENT,COMPREHESV: ICD-10-PCS | Mod: S$GLB,,, | Performed by: OPTOMETRIST

## 2020-12-08 PROCEDURE — 99999 PR PBB SHADOW E&M-EST. PATIENT-LVL IV: ICD-10-PCS | Mod: PBBFAC,,, | Performed by: OPTOMETRIST

## 2020-12-08 PROCEDURE — 2023F DILAT RTA XM W/O RTNOPTHY: CPT | Mod: S$GLB,,, | Performed by: OPTOMETRIST

## 2020-12-08 PROCEDURE — 1125F PR PAIN SEVERITY QUANTIFIED, PAIN PRESENT: ICD-10-PCS | Mod: S$GLB,,, | Performed by: OPTOMETRIST

## 2020-12-08 PROCEDURE — 1125F AMNT PAIN NOTED PAIN PRSNT: CPT | Mod: S$GLB,,, | Performed by: OPTOMETRIST

## 2020-12-08 PROCEDURE — 92014 COMPRE OPH EXAM EST PT 1/>: CPT | Mod: S$GLB,,, | Performed by: OPTOMETRIST

## 2020-12-08 PROCEDURE — 92015 DETERMINE REFRACTIVE STATE: CPT | Mod: S$GLB,,, | Performed by: OPTOMETRIST

## 2020-12-08 PROCEDURE — 99999 PR PBB SHADOW E&M-EST. PATIENT-LVL IV: CPT | Mod: PBBFAC,,, | Performed by: OPTOMETRIST

## 2020-12-08 PROCEDURE — 2023F PR DILATED RETINAL EXAM W/O EVID OF RETINOPATHY: ICD-10-PCS | Mod: S$GLB,,, | Performed by: OPTOMETRIST

## 2020-12-08 NOTE — LETTER
December 8, 2020      Emanuel Lopez MD  [address]  [address]  [place] [address]           [facility] 1st Fl  [address] [facility].  [place] [address]  Phone: [phone]  Fax: [phone]          Patient: Vick Gonzalez   MR Number: [id number]   YOB: 1955   Date of Visit: 12/8/2020       Dear Dr. Emanuel Lopez:    Thank you for referring Vick Gonzalez to me for evaluation. Attached you will find relevant portions of my assessment and plan of care.    If you have questions, please do not hesitate to call me. I look forward to following Vick Gonzalez along with you.    Sincerely,    Sundar Brooks, OD    Enclosure  CC:  No Recipients    If you would like to receive this communication electronically, please contact [email or web address] or [phone] to request more information on [facility] access.    For providers and/or their staff who would like to refer a patient to [facility], please contact us through our one-stop-shop provider referral line, [facility], at [phone].    If you feel you have received this communication in error or would no longer like to receive these types of communications, please e-mail [email or web address]

## 2020-12-08 NOTE — PROGRESS NOTES
Erika Marks Catie  7/9/20     Chief Complaint   Patient presents with    Hypertension     6 month check up /review labs         No Known Allergies     Current Outpatient Medications   Medication Sig Dispense Refill    rosuvastatin (CRESTOR) 5 MG tablet Take 1 tablet by mouth daily 90 tablet 3    atenolol (TENORMIN) 50 MG tablet Take 1 tablet by mouth daily 90 tablet 3    escitalopram (LEXAPRO) 10 MG tablet Take 0.5 tablets by mouth daily 45 tablet 3    losartan (COZAAR) 50 MG tablet Take 1 tablet by mouth 2 times daily 180 tablet 3    Loratadine (CLARITIN PO) Take 1 tablet by mouth as needed. No current facility-administered medications for this visit. HPI: Patient comes in for routine six-month follow-up visit and to review labs. Currently feels well. He remains on all of usual meds and supplements the same as listed on his med list, which was reviewed with him. He tries to maintain a heart healthy diet and exercises on a regular basis walking at least 5 miles every day. He denies any chest pain or shortness of breath. Review of Systems: as per HPI      Physical Exam:    Patient is a 78 y.o. male. Patient appears to be in no distress. Breathing comfortably. Ambulates without assistance. HEENT: normal.  Neck supple, no adenopathy or bruits. Heart RR, no MGR. Lungs clear. Abd: normal  Ext.: no edema. Peripheral pulses: normal.  No neurologic deficits noted.     Lab Results   Component Value Date    WBC 8.4 11/20/2014    HGB 12.9 11/20/2014    HCT 37.4 11/20/2014     11/20/2014    CHOL 167 07/02/2020    TRIG 202 (H) 07/02/2020    HDL 39 07/02/2020    ALT 17 07/02/2020    AST 19 07/02/2020    TSH 2.030 04/29/2014    PSA 0.9 06/04/2019    LABA1C 5.5 06/04/2019      Lab Results   Component Value Date     07/02/2020    K 4.1 07/02/2020     07/02/2020    CO2 23 07/02/2020    BUN 12 07/02/2020    CREATININE 0.8 07/02/2020    GLUCOSE 93 07/02/2020    CALCIUM 9.5 07/02/2020 HPI     DLS 10/29/2019  Patient here for routine Diabetic Eye Exam  Hemoglobin A1C (%)       Date                     Value                 11/11/2020               9.1 (H)               09/01/2020               11.9 (H)              01/23/2020               8.7 (H)          ----------   Patient presents flashes and floaters with distance blurry vision X 6 mo.  Patient deny any HA-  DIPLOPIA-  PHOTOPHOBIA-  PAIN    Last edited by Dariel Ragsdale on 12/8/2020  9:50 AM. (History)        ROS     Positive for: Endocrine (DM)    Negative for: Constitutional, Gastrointestinal, Neurological, Skin,   Genitourinary, Musculoskeletal, HENT, Cardiovascular, Eyes, Respiratory,   Psychiatric, Allergic/Imm, Heme/Lymph    Last edited by Sundar Brooks, OD on 12/8/2020 10:04 AM. (History)        Assessment /Plan     For exam results, see Encounter Report.    Type 2 diabetes mellitus without retinopathy    Diabetes mellitus without complication  -     Ambulatory referral/consult to Optometry    Nuclear sclerosis, bilateral    Glaucoma screening    Presbyopia      1. Cat OU--pt wishes new spex Rx  2. DM/HTN- WITHOUT RETINOPATHY.  Advised yearly DFE    PLAN:    rtc 1 yr                  PROT 6.6 07/02/2020    LABALBU 4.2 07/02/2020    BILITOT 1.4 (H) 07/02/2020    ALKPHOS 64 07/02/2020    AST 19 07/02/2020    ALT 17 07/02/2020    LABGLOM >60 07/02/2020    GFRAA >60 07/02/2020            Assessment and Plan:    Mukesh Corea was seen today for hypertension and hyperlipidemia. Diagnoses and all orders for this visit:    Essential hypertension, fair control  -     Discontinue: losartan (COZAAR) 50 MG tablet; Take 1 tablet by mouth daily (Patient taking differently: Take 50 mg by mouth 2 times daily )  -     atenolol (TENORMIN) 50 MG tablet; Take 1 tablet by mouth daily  -     losartan (COZAAR) 50 MG tablet; Take 1 tablet by mouth 2 times daily    Mixed hyperlipidemia, fair control on current meds and diet and exercise. -     rosuvastatin (CRESTOR) 5 MG tablet; Take 1 tablet by mouth daily    Anxiety and depression well-controlled on current meds. -     escitalopram (LEXAPRO) 10 MG tablet; Take 0.5 tablets by mouth daily        Discussion Notes: Patient to continue all of his usual meds and supplements the same as listed on his med list.  He is encouraged to continue to follow a low-sodium, low-cholesterol, heart healthy diet and regular exercise.   Return as needed or in 6 months for his annual physical.    Return in about 6 months (around 1/9/2021), or  annual physical.

## 2021-01-05 ENCOUNTER — OFFICE VISIT (OUTPATIENT)
Dept: PODIATRY | Facility: CLINIC | Age: 66
End: 2021-01-05
Payer: MEDICARE

## 2021-01-05 VITALS — BODY MASS INDEX: 36.23 KG/M2 | HEIGHT: 73 IN | WEIGHT: 273.38 LBS

## 2021-01-05 DIAGNOSIS — E11.42 DIABETIC POLYNEUROPATHY ASSOCIATED WITH TYPE 2 DIABETES MELLITUS: ICD-10-CM

## 2021-01-05 DIAGNOSIS — I73.9 CLAUDICATION IN PERIPHERAL VASCULAR DISEASE: ICD-10-CM

## 2021-01-05 DIAGNOSIS — B35.1 ONYCHOMYCOSIS DUE TO DERMATOPHYTE: Primary | ICD-10-CM

## 2021-01-05 PROCEDURE — 1125F PR PAIN SEVERITY QUANTIFIED, PAIN PRESENT: ICD-10-PCS | Mod: HCNC,S$GLB,, | Performed by: STUDENT IN AN ORGANIZED HEALTH CARE EDUCATION/TRAINING PROGRAM

## 2021-01-05 PROCEDURE — 99999 PR PBB SHADOW E&M-EST. PATIENT-LVL V: CPT | Mod: PBBFAC,HCNC,, | Performed by: STUDENT IN AN ORGANIZED HEALTH CARE EDUCATION/TRAINING PROGRAM

## 2021-01-05 PROCEDURE — 3008F BODY MASS INDEX DOCD: CPT | Mod: HCNC,CPTII,S$GLB, | Performed by: STUDENT IN AN ORGANIZED HEALTH CARE EDUCATION/TRAINING PROGRAM

## 2021-01-05 PROCEDURE — 3072F PR LOW RISK FOR RETINOPATHY: ICD-10-PCS | Mod: HCNC,S$GLB,, | Performed by: STUDENT IN AN ORGANIZED HEALTH CARE EDUCATION/TRAINING PROGRAM

## 2021-01-05 PROCEDURE — 3046F PR MOST RECENT HEMOGLOBIN A1C LEVEL > 9.0%: ICD-10-PCS | Mod: HCNC,CPTII,S$GLB, | Performed by: STUDENT IN AN ORGANIZED HEALTH CARE EDUCATION/TRAINING PROGRAM

## 2021-01-05 PROCEDURE — 11721 DEBRIDE NAIL 6 OR MORE: CPT | Mod: Q8,HCNC,S$GLB, | Performed by: STUDENT IN AN ORGANIZED HEALTH CARE EDUCATION/TRAINING PROGRAM

## 2021-01-05 PROCEDURE — 99203 PR OFFICE/OUTPT VISIT, NEW, LEVL III, 30-44 MIN: ICD-10-PCS | Mod: 25,HCNC,S$GLB, | Performed by: STUDENT IN AN ORGANIZED HEALTH CARE EDUCATION/TRAINING PROGRAM

## 2021-01-05 PROCEDURE — 3072F LOW RISK FOR RETINOPATHY: CPT | Mod: HCNC,S$GLB,, | Performed by: STUDENT IN AN ORGANIZED HEALTH CARE EDUCATION/TRAINING PROGRAM

## 2021-01-05 PROCEDURE — 1125F AMNT PAIN NOTED PAIN PRSNT: CPT | Mod: HCNC,S$GLB,, | Performed by: STUDENT IN AN ORGANIZED HEALTH CARE EDUCATION/TRAINING PROGRAM

## 2021-01-05 PROCEDURE — 3046F HEMOGLOBIN A1C LEVEL >9.0%: CPT | Mod: HCNC,CPTII,S$GLB, | Performed by: STUDENT IN AN ORGANIZED HEALTH CARE EDUCATION/TRAINING PROGRAM

## 2021-01-05 PROCEDURE — 3008F PR BODY MASS INDEX (BMI) DOCUMENTED: ICD-10-PCS | Mod: HCNC,CPTII,S$GLB, | Performed by: STUDENT IN AN ORGANIZED HEALTH CARE EDUCATION/TRAINING PROGRAM

## 2021-01-05 PROCEDURE — 11721 ROUTINE FOOT CARE: ICD-10-PCS | Mod: Q8,HCNC,S$GLB, | Performed by: STUDENT IN AN ORGANIZED HEALTH CARE EDUCATION/TRAINING PROGRAM

## 2021-01-05 PROCEDURE — 99999 PR PBB SHADOW E&M-EST. PATIENT-LVL V: ICD-10-PCS | Mod: PBBFAC,HCNC,, | Performed by: STUDENT IN AN ORGANIZED HEALTH CARE EDUCATION/TRAINING PROGRAM

## 2021-01-05 PROCEDURE — 99203 OFFICE O/P NEW LOW 30 MIN: CPT | Mod: 25,HCNC,S$GLB, | Performed by: STUDENT IN AN ORGANIZED HEALTH CARE EDUCATION/TRAINING PROGRAM

## 2021-01-06 ENCOUNTER — TELEPHONE (OUTPATIENT)
Dept: ADMINISTRATIVE | Facility: OTHER | Age: 66
End: 2021-01-06

## 2021-01-11 ENCOUNTER — HOSPITAL ENCOUNTER (OUTPATIENT)
Dept: RADIOLOGY | Facility: HOSPITAL | Age: 66
Discharge: HOME | End: 2021-01-11
Attending: STUDENT IN AN ORGANIZED HEALTH CARE EDUCATION/TRAINING PROGRAM
Payer: MEDICARE

## 2021-01-11 DIAGNOSIS — I73.9 CLAUDICATION IN PERIPHERAL VASCULAR DISEASE: ICD-10-CM

## 2021-01-11 DIAGNOSIS — B35.1 ONYCHOMYCOSIS DUE TO DERMATOPHYTE: ICD-10-CM

## 2021-01-11 DIAGNOSIS — E11.42 DIABETIC POLYNEUROPATHY ASSOCIATED WITH TYPE 2 DIABETES MELLITUS: ICD-10-CM

## 2021-01-11 PROCEDURE — 93925 US LOWER EXTREMITY ARTERIES BILATERAL: ICD-10-PCS | Mod: 26,HCNC,, | Performed by: RADIOLOGY

## 2021-01-11 PROCEDURE — 93925 LOWER EXTREMITY STUDY: CPT | Mod: TC,HCNC

## 2021-01-11 PROCEDURE — 93925 LOWER EXTREMITY STUDY: CPT | Mod: 26,HCNC,, | Performed by: RADIOLOGY

## 2021-01-12 ENCOUNTER — TELEPHONE (OUTPATIENT)
Dept: PODIATRY | Facility: CLINIC | Age: 66
End: 2021-01-12

## 2021-01-13 ENCOUNTER — TELEPHONE (OUTPATIENT)
Dept: PODIATRY | Facility: CLINIC | Age: 66
End: 2021-01-13

## 2021-01-19 ENCOUNTER — TELEPHONE (OUTPATIENT)
Dept: PODIATRY | Facility: CLINIC | Age: 66
End: 2021-01-19

## 2021-01-21 ENCOUNTER — TELEPHONE (OUTPATIENT)
Dept: FAMILY MEDICINE | Facility: CLINIC | Age: 66
End: 2021-01-21

## 2021-01-26 ENCOUNTER — LAB VISIT (OUTPATIENT)
Dept: LAB | Facility: HOSPITAL | Age: 66
End: 2021-01-26
Attending: INTERNAL MEDICINE
Payer: MEDICARE

## 2021-01-26 DIAGNOSIS — Z94.4 LIVER REPLACED BY TRANSPLANT: ICD-10-CM

## 2021-01-26 DIAGNOSIS — Z94.4 LIVER TRANSPLANTED: ICD-10-CM

## 2021-01-26 DIAGNOSIS — K74.00 HEPATIC FIBROSIS: ICD-10-CM

## 2021-01-26 LAB
ALBUMIN SERPL BCP-MCNC: 3.7 G/DL (ref 3.5–5.2)
ALP SERPL-CCNC: 84 U/L (ref 55–135)
ALT SERPL W/O P-5'-P-CCNC: 30 U/L (ref 10–44)
ANION GAP SERPL CALC-SCNC: 7 MMOL/L (ref 8–16)
AST SERPL-CCNC: 24 U/L (ref 10–40)
BASOPHILS # BLD AUTO: 0.04 K/UL (ref 0–0.2)
BASOPHILS NFR BLD: 0.5 % (ref 0–1.9)
BILIRUB SERPL-MCNC: 0.5 MG/DL (ref 0.1–1)
BUN SERPL-MCNC: 17 MG/DL (ref 8–23)
CALCIUM SERPL-MCNC: 8.9 MG/DL (ref 8.7–10.5)
CHLORIDE SERPL-SCNC: 102 MMOL/L (ref 95–110)
CO2 SERPL-SCNC: 27 MMOL/L (ref 23–29)
CREAT SERPL-MCNC: 1.5 MG/DL (ref 0.5–1.4)
DIFFERENTIAL METHOD: ABNORMAL
EOSINOPHIL # BLD AUTO: 0.6 K/UL (ref 0–0.5)
EOSINOPHIL NFR BLD: 6.5 % (ref 0–8)
ERYTHROCYTE [DISTWIDTH] IN BLOOD BY AUTOMATED COUNT: 13.6 % (ref 11.5–14.5)
EST. GFR  (AFRICAN AMERICAN): 56.5 ML/MIN/1.73 M^2
EST. GFR  (NON AFRICAN AMERICAN): 48.8 ML/MIN/1.73 M^2
GLUCOSE SERPL-MCNC: 390 MG/DL (ref 70–110)
HCT VFR BLD AUTO: 39.9 % (ref 40–54)
HGB BLD-MCNC: 12.8 G/DL (ref 14–18)
IMM GRANULOCYTES # BLD AUTO: 0.02 K/UL (ref 0–0.04)
IMM GRANULOCYTES NFR BLD AUTO: 0.2 % (ref 0–0.5)
LYMPHOCYTES # BLD AUTO: 3.1 K/UL (ref 1–4.8)
LYMPHOCYTES NFR BLD: 36.1 % (ref 18–48)
MCH RBC QN AUTO: 29 PG (ref 27–31)
MCHC RBC AUTO-ENTMCNC: 32.1 G/DL (ref 32–36)
MCV RBC AUTO: 90 FL (ref 82–98)
MONOCYTES # BLD AUTO: 0.7 K/UL (ref 0.3–1)
MONOCYTES NFR BLD: 7.7 % (ref 4–15)
NEUTROPHILS # BLD AUTO: 4.3 K/UL (ref 1.8–7.7)
NEUTROPHILS NFR BLD: 49 % (ref 38–73)
NRBC BLD-RTO: 0 /100 WBC
PLATELET # BLD AUTO: 148 K/UL (ref 150–350)
PMV BLD AUTO: 13.8 FL (ref 9.2–12.9)
POTASSIUM SERPL-SCNC: 4.2 MMOL/L (ref 3.5–5.1)
PROT SERPL-MCNC: 7.3 G/DL (ref 6–8.4)
RBC # BLD AUTO: 4.42 M/UL (ref 4.6–6.2)
SODIUM SERPL-SCNC: 136 MMOL/L (ref 136–145)
WBC # BLD AUTO: 8.67 K/UL (ref 3.9–12.7)

## 2021-01-26 PROCEDURE — 80197 ASSAY OF TACROLIMUS: CPT | Mod: HCNC

## 2021-01-26 PROCEDURE — 36415 COLL VENOUS BLD VENIPUNCTURE: CPT | Mod: HCNC,PO

## 2021-01-26 PROCEDURE — 85025 COMPLETE CBC W/AUTO DIFF WBC: CPT | Mod: HCNC

## 2021-01-26 PROCEDURE — 82105 ALPHA-FETOPROTEIN SERUM: CPT | Mod: HCNC

## 2021-01-26 PROCEDURE — 80053 COMPREHEN METABOLIC PANEL: CPT | Mod: HCNC

## 2021-01-27 LAB
AFP SERPL-MCNC: 4.2 NG/ML (ref 0–8.4)
TACROLIMUS BLD-MCNC: 3.7 NG/ML (ref 5–15)

## 2021-01-29 ENCOUNTER — TELEPHONE (OUTPATIENT)
Dept: TRANSPLANT | Facility: CLINIC | Age: 66
End: 2021-01-29

## 2021-01-29 DIAGNOSIS — Z94.4 LIVER REPLACED BY TRANSPLANT: Primary | ICD-10-CM

## 2021-02-02 ENCOUNTER — OFFICE VISIT (OUTPATIENT)
Dept: CARDIOLOGY | Facility: CLINIC | Age: 66
End: 2021-02-02
Payer: MEDICARE

## 2021-02-02 VITALS
SYSTOLIC BLOOD PRESSURE: 117 MMHG | HEIGHT: 73 IN | HEART RATE: 77 BPM | OXYGEN SATURATION: 97 % | BODY MASS INDEX: 34.57 KG/M2 | DIASTOLIC BLOOD PRESSURE: 76 MMHG | WEIGHT: 260.81 LBS

## 2021-02-02 DIAGNOSIS — Z94.4 S/P LIVER TRANSPLANT: ICD-10-CM

## 2021-02-02 DIAGNOSIS — E78.1 HYPERTRIGLYCERIDEMIA: ICD-10-CM

## 2021-02-02 DIAGNOSIS — E11.65 UNCONTROLLED TYPE 2 DIABETES MELLITUS WITH HYPERGLYCEMIA: ICD-10-CM

## 2021-02-02 DIAGNOSIS — N18.30 STAGE 3 CHRONIC KIDNEY DISEASE, UNSPECIFIED WHETHER STAGE 3A OR 3B CKD: ICD-10-CM

## 2021-02-02 DIAGNOSIS — M54.16 LUMBAR RADICULOPATHY: ICD-10-CM

## 2021-02-02 DIAGNOSIS — I73.9 PAD (PERIPHERAL ARTERY DISEASE): Primary | ICD-10-CM

## 2021-02-02 DIAGNOSIS — G89.4 CHRONIC PAIN SYNDROME: ICD-10-CM

## 2021-02-02 DIAGNOSIS — I70.0 AORTIC ATHEROSCLEROSIS: ICD-10-CM

## 2021-02-02 DIAGNOSIS — D84.9 IMMUNOSUPPRESSED STATUS: ICD-10-CM

## 2021-02-02 DIAGNOSIS — M54.9 BACK PAIN, UNSPECIFIED BACK LOCATION, UNSPECIFIED BACK PAIN LATERALITY, UNSPECIFIED CHRONICITY: ICD-10-CM

## 2021-02-02 DIAGNOSIS — E11.42 DIABETIC POLYNEUROPATHY ASSOCIATED WITH TYPE 2 DIABETES MELLITUS: ICD-10-CM

## 2021-02-02 DIAGNOSIS — I10 ESSENTIAL HYPERTENSION: ICD-10-CM

## 2021-02-02 DIAGNOSIS — Z86.19 HISTORY OF HEPATITIS C: ICD-10-CM

## 2021-02-02 DIAGNOSIS — M10.9 GOUT, UNSPECIFIED CAUSE, UNSPECIFIED CHRONICITY, UNSPECIFIED SITE: ICD-10-CM

## 2021-02-02 DIAGNOSIS — E66.01 SEVERE OBESITY (BMI 35.0-39.9) WITH COMORBIDITY: ICD-10-CM

## 2021-02-02 PROCEDURE — 3008F BODY MASS INDEX DOCD: CPT | Mod: HCNC,CPTII,S$GLB, | Performed by: INTERNAL MEDICINE

## 2021-02-02 PROCEDURE — 99499 RISK ADDL DX/OHS AUDIT: ICD-10-PCS | Mod: S$GLB,,, | Performed by: INTERNAL MEDICINE

## 2021-02-02 PROCEDURE — 3072F PR LOW RISK FOR RETINOPATHY: ICD-10-PCS | Mod: HCNC,S$GLB,, | Performed by: INTERNAL MEDICINE

## 2021-02-02 PROCEDURE — 99499 UNLISTED E&M SERVICE: CPT | Mod: S$GLB,,, | Performed by: INTERNAL MEDICINE

## 2021-02-02 PROCEDURE — 1126F AMNT PAIN NOTED NONE PRSNT: CPT | Mod: HCNC,S$GLB,, | Performed by: INTERNAL MEDICINE

## 2021-02-02 PROCEDURE — 3008F PR BODY MASS INDEX (BMI) DOCUMENTED: ICD-10-PCS | Mod: HCNC,CPTII,S$GLB, | Performed by: INTERNAL MEDICINE

## 2021-02-02 PROCEDURE — 3072F LOW RISK FOR RETINOPATHY: CPT | Mod: HCNC,S$GLB,, | Performed by: INTERNAL MEDICINE

## 2021-02-02 PROCEDURE — 99204 PR OFFICE/OUTPT VISIT, NEW, LEVL IV, 45-59 MIN: ICD-10-PCS | Mod: HCNC,S$GLB,, | Performed by: INTERNAL MEDICINE

## 2021-02-02 PROCEDURE — 3046F HEMOGLOBIN A1C LEVEL >9.0%: CPT | Mod: HCNC,CPTII,S$GLB, | Performed by: INTERNAL MEDICINE

## 2021-02-02 PROCEDURE — 3078F DIAST BP <80 MM HG: CPT | Mod: HCNC,CPTII,S$GLB, | Performed by: INTERNAL MEDICINE

## 2021-02-02 PROCEDURE — 99999 PR PBB SHADOW E&M-EST. PATIENT-LVL V: ICD-10-PCS | Mod: PBBFAC,HCNC,, | Performed by: INTERNAL MEDICINE

## 2021-02-02 PROCEDURE — 99204 OFFICE O/P NEW MOD 45 MIN: CPT | Mod: HCNC,S$GLB,, | Performed by: INTERNAL MEDICINE

## 2021-02-02 PROCEDURE — 1126F PR PAIN SEVERITY QUANTIFIED, NO PAIN PRESENT: ICD-10-PCS | Mod: HCNC,S$GLB,, | Performed by: INTERNAL MEDICINE

## 2021-02-02 PROCEDURE — 3074F SYST BP LT 130 MM HG: CPT | Mod: HCNC,CPTII,S$GLB, | Performed by: INTERNAL MEDICINE

## 2021-02-02 PROCEDURE — 3078F PR MOST RECENT DIASTOLIC BLOOD PRESSURE < 80 MM HG: ICD-10-PCS | Mod: HCNC,CPTII,S$GLB, | Performed by: INTERNAL MEDICINE

## 2021-02-02 PROCEDURE — 99999 PR PBB SHADOW E&M-EST. PATIENT-LVL V: CPT | Mod: PBBFAC,HCNC,, | Performed by: INTERNAL MEDICINE

## 2021-02-02 PROCEDURE — 3046F PR MOST RECENT HEMOGLOBIN A1C LEVEL > 9.0%: ICD-10-PCS | Mod: HCNC,CPTII,S$GLB, | Performed by: INTERNAL MEDICINE

## 2021-02-02 PROCEDURE — 3074F PR MOST RECENT SYSTOLIC BLOOD PRESSURE < 130 MM HG: ICD-10-PCS | Mod: HCNC,CPTII,S$GLB, | Performed by: INTERNAL MEDICINE

## 2021-02-02 RX ORDER — NAPROXEN SODIUM 220 MG/1
81 TABLET, FILM COATED ORAL DAILY
Qty: 90 TABLET | Refills: 3 | Status: SHIPPED | OUTPATIENT
Start: 2021-02-02

## 2021-02-23 ENCOUNTER — HOSPITAL ENCOUNTER (EMERGENCY)
Facility: HOSPITAL | Age: 66
Discharge: HOME | End: 2021-02-24
Attending: EMERGENCY MEDICINE
Payer: MEDICARE

## 2021-02-23 DIAGNOSIS — R73.9 HYPERGLYCEMIA: ICD-10-CM

## 2021-02-23 DIAGNOSIS — E86.0 DEHYDRATION: Primary | ICD-10-CM

## 2021-02-23 LAB
ALBUMIN SERPL BCP-MCNC: 4 G/DL (ref 3.5–5.2)
ALP SERPL-CCNC: 109 U/L (ref 55–135)
ALT SERPL W/O P-5'-P-CCNC: 23 U/L (ref 10–44)
ANION GAP SERPL CALC-SCNC: 9 MMOL/L (ref 8–16)
AST SERPL-CCNC: 20 U/L (ref 10–40)
B-OH-BUTYR BLD STRIP-SCNC: 0.1 MMOL/L (ref 0–0.5)
BACTERIA #/AREA URNS HPF: ABNORMAL /HPF
BASOPHILS # BLD AUTO: 0.04 K/UL (ref 0–0.2)
BASOPHILS NFR BLD: 0.4 % (ref 0–1.9)
BILIRUB SERPL-MCNC: 0.4 MG/DL (ref 0.1–1)
BILIRUB UR QL STRIP: NEGATIVE
BUN SERPL-MCNC: 26 MG/DL (ref 8–23)
CALCIUM SERPL-MCNC: 9.4 MG/DL (ref 8.7–10.5)
CHLORIDE SERPL-SCNC: 97 MMOL/L (ref 95–110)
CK SERPL-CCNC: 226 U/L (ref 20–200)
CLARITY UR: CLEAR
CO2 SERPL-SCNC: 25 MMOL/L (ref 23–29)
COLOR UR: ABNORMAL
CREAT SERPL-MCNC: 2 MG/DL (ref 0.5–1.4)
DIFFERENTIAL METHOD: ABNORMAL
EOSINOPHIL # BLD AUTO: 0.5 K/UL (ref 0–0.5)
EOSINOPHIL NFR BLD: 5.4 % (ref 0–8)
ERYTHROCYTE [DISTWIDTH] IN BLOOD BY AUTOMATED COUNT: 13 % (ref 11.5–14.5)
EST. GFR  (AFRICAN AMERICAN): 40 ML/MIN/1.73 M^2
EST. GFR  (NON AFRICAN AMERICAN): 34 ML/MIN/1.73 M^2
GLUCOSE SERPL-MCNC: 432 MG/DL (ref 70–110)
GLUCOSE UR QL STRIP: ABNORMAL
HCO3 UR-SCNC: 30.6 MMOL/L (ref 24–28)
HCT VFR BLD AUTO: 39.2 % (ref 40–54)
HGB BLD-MCNC: 13.2 G/DL (ref 14–18)
HGB UR QL STRIP: NEGATIVE
HYALINE CASTS #/AREA URNS LPF: 10 /LPF
IMM GRANULOCYTES # BLD AUTO: 0.03 K/UL (ref 0–0.04)
IMM GRANULOCYTES NFR BLD AUTO: 0.3 % (ref 0–0.5)
KETONES UR QL STRIP: NEGATIVE
LEUKOCYTE ESTERASE UR QL STRIP: NEGATIVE
LYMPHOCYTES # BLD AUTO: 3.6 K/UL (ref 1–4.8)
LYMPHOCYTES NFR BLD: 36.3 % (ref 18–48)
MCH RBC QN AUTO: 29.2 PG (ref 27–31)
MCHC RBC AUTO-ENTMCNC: 33.7 G/DL (ref 32–36)
MCV RBC AUTO: 87 FL (ref 82–98)
MICROSCOPIC COMMENT: ABNORMAL
MONOCYTES # BLD AUTO: 0.7 K/UL (ref 0.3–1)
MONOCYTES NFR BLD: 6.8 % (ref 4–15)
NEUTROPHILS # BLD AUTO: 5 K/UL (ref 1.8–7.7)
NEUTROPHILS NFR BLD: 50.8 % (ref 38–73)
NITRITE UR QL STRIP: NEGATIVE
NRBC BLD-RTO: 0 /100 WBC
PCO2 BLDA: 47.6 MMHG (ref 35–45)
PH SMN: 7.42 [PH] (ref 7.35–7.45)
PH UR STRIP: 5 [PH] (ref 5–8)
PLATELET # BLD AUTO: 92 K/UL (ref 150–350)
PLATELET BLD QL SMEAR: ABNORMAL
PMV BLD AUTO: 13.7 FL (ref 9.2–12.9)
PO2 BLDA: 57 MMHG (ref 40–60)
POC BE: 6 MMOL/L
POC SATURATED O2: 89 % (ref 95–100)
POC TCO2: 32 MMOL/L (ref 24–29)
POCT GLUCOSE: 279 MG/DL (ref 70–110)
POCT GLUCOSE: 315 MG/DL (ref 70–110)
POCT GLUCOSE: 402 MG/DL (ref 70–110)
POCT GLUCOSE: 430 MG/DL (ref 70–110)
POTASSIUM SERPL-SCNC: 4.6 MMOL/L (ref 3.5–5.1)
PROT SERPL-MCNC: 7.5 G/DL (ref 6–8.4)
PROT UR QL STRIP: ABNORMAL
RBC # BLD AUTO: 4.52 M/UL (ref 4.6–6.2)
RBC #/AREA URNS HPF: 2 /HPF (ref 0–4)
SAMPLE: ABNORMAL
SODIUM SERPL-SCNC: 131 MMOL/L (ref 136–145)
SP GR UR STRIP: 1.01 (ref 1–1.03)
URN SPEC COLLECT METH UR: ABNORMAL
UROBILINOGEN UR STRIP-ACNC: NEGATIVE EU/DL
WBC # BLD AUTO: 9.84 K/UL (ref 3.9–12.7)
WBC #/AREA URNS HPF: 5 /HPF (ref 0–5)
YEAST URNS QL MICRO: ABNORMAL

## 2021-02-23 PROCEDURE — 93005 ELECTROCARDIOGRAM TRACING: CPT

## 2021-02-23 PROCEDURE — 96360 HYDRATION IV INFUSION INIT: CPT

## 2021-02-23 PROCEDURE — 93010 ELECTROCARDIOGRAM REPORT: CPT | Mod: ,,, | Performed by: INTERNAL MEDICINE

## 2021-02-23 PROCEDURE — 82803 BLOOD GASES ANY COMBINATION: CPT | Mod: HCNC

## 2021-02-23 PROCEDURE — 96361 HYDRATE IV INFUSION ADD-ON: CPT

## 2021-02-23 PROCEDURE — 25000003 PHARM REV CODE 250: Performed by: NURSE PRACTITIONER

## 2021-02-23 PROCEDURE — 99900035 HC TECH TIME PER 15 MIN (STAT): Mod: HCNC

## 2021-02-23 PROCEDURE — 80053 COMPREHEN METABOLIC PANEL: CPT

## 2021-02-23 PROCEDURE — 82010 KETONE BODYS QUAN: CPT

## 2021-02-23 PROCEDURE — 25000003 PHARM REV CODE 250: Performed by: EMERGENCY MEDICINE

## 2021-02-23 PROCEDURE — 82962 GLUCOSE BLOOD TEST: CPT

## 2021-02-23 PROCEDURE — 99285 EMERGENCY DEPT VISIT HI MDM: CPT | Mod: 25

## 2021-02-23 PROCEDURE — 81000 URINALYSIS NONAUTO W/SCOPE: CPT

## 2021-02-23 PROCEDURE — 93010 EKG 12-LEAD: ICD-10-PCS | Mod: ,,, | Performed by: INTERNAL MEDICINE

## 2021-02-23 PROCEDURE — 82550 ASSAY OF CK (CPK): CPT

## 2021-02-23 PROCEDURE — 85025 COMPLETE CBC W/AUTO DIFF WBC: CPT

## 2021-02-23 RX ORDER — INSULIN ASPART 100 [IU]/ML
20 INJECTION, SOLUTION INTRAVENOUS; SUBCUTANEOUS
Qty: 15 ML | Refills: 11 | Status: SHIPPED | OUTPATIENT
Start: 2021-02-23 | End: 2021-12-09 | Stop reason: SDUPTHER

## 2021-02-23 RX ORDER — METHOCARBAMOL 500 MG/1
1000 TABLET, FILM COATED ORAL
Status: COMPLETED | OUTPATIENT
Start: 2021-02-23 | End: 2021-02-23

## 2021-02-23 RX ADMIN — SODIUM CHLORIDE 1000 ML: 0.9 INJECTION, SOLUTION INTRAVENOUS at 10:02

## 2021-02-23 RX ADMIN — SODIUM CHLORIDE 1000 ML: 0.9 INJECTION, SOLUTION INTRAVENOUS at 09:02

## 2021-02-23 RX ADMIN — METHOCARBAMOL TABLETS 1000 MG: 500 TABLET, COATED ORAL at 10:02

## 2021-02-24 VITALS
DIASTOLIC BLOOD PRESSURE: 81 MMHG | WEIGHT: 260 LBS | SYSTOLIC BLOOD PRESSURE: 158 MMHG | HEART RATE: 93 BPM | HEIGHT: 74 IN | OXYGEN SATURATION: 97 % | BODY MASS INDEX: 33.37 KG/M2 | RESPIRATION RATE: 16 BRPM | TEMPERATURE: 98 F

## 2021-02-25 ENCOUNTER — TELEPHONE (OUTPATIENT)
Dept: FAMILY MEDICINE | Facility: CLINIC | Age: 66
End: 2021-02-25

## 2021-02-25 DIAGNOSIS — E11.65 UNCONTROLLED TYPE 2 DIABETES MELLITUS WITH HYPERGLYCEMIA: Primary | ICD-10-CM

## 2021-02-25 DIAGNOSIS — E11.9 TYPE 2 DIABETES MELLITUS WITHOUT COMPLICATION: ICD-10-CM

## 2021-02-25 DIAGNOSIS — N18.9 CHRONIC KIDNEY DISEASE, UNSPECIFIED CKD STAGE: ICD-10-CM

## 2021-03-01 ENCOUNTER — TELEPHONE (OUTPATIENT)
Dept: FAMILY MEDICINE | Facility: CLINIC | Age: 66
End: 2021-03-01

## 2021-03-01 ENCOUNTER — OFFICE VISIT (OUTPATIENT)
Dept: FAMILY MEDICINE | Facility: CLINIC | Age: 66
End: 2021-03-01
Payer: MEDICARE

## 2021-03-01 ENCOUNTER — HOSPITAL ENCOUNTER (OUTPATIENT)
Dept: RADIOLOGY | Facility: HOSPITAL | Age: 66
Discharge: HOME | End: 2021-03-01
Attending: FAMILY MEDICINE
Payer: MEDICARE

## 2021-03-01 VITALS
WEIGHT: 266.31 LBS | SYSTOLIC BLOOD PRESSURE: 114 MMHG | HEIGHT: 74 IN | TEMPERATURE: 99 F | BODY MASS INDEX: 34.18 KG/M2 | HEART RATE: 82 BPM | OXYGEN SATURATION: 98 % | DIASTOLIC BLOOD PRESSURE: 68 MMHG

## 2021-03-01 DIAGNOSIS — I73.9 CLAUDICATION IN PERIPHERAL VASCULAR DISEASE: ICD-10-CM

## 2021-03-01 DIAGNOSIS — E11.65 UNCONTROLLED TYPE 2 DIABETES MELLITUS WITH HYPERGLYCEMIA: Primary | ICD-10-CM

## 2021-03-01 DIAGNOSIS — E11.42 DIABETIC POLYNEUROPATHY ASSOCIATED WITH TYPE 2 DIABETES MELLITUS: ICD-10-CM

## 2021-03-01 DIAGNOSIS — M79.642 LEFT HAND PAIN: ICD-10-CM

## 2021-03-01 PROCEDURE — 99215 OFFICE O/P EST HI 40 MIN: CPT | Mod: S$GLB,,, | Performed by: FAMILY MEDICINE

## 2021-03-01 PROCEDURE — 73130 XR HAND COMPLETE 3 VIEW LEFT: ICD-10-PCS | Mod: 26,LT,, | Performed by: RADIOLOGY

## 2021-03-01 PROCEDURE — 99215 PR OFFICE/OUTPT VISIT, EST, LEVL V, 40-54 MIN: ICD-10-PCS | Mod: S$GLB,,, | Performed by: FAMILY MEDICINE

## 2021-03-01 PROCEDURE — 3046F PR MOST RECENT HEMOGLOBIN A1C LEVEL > 9.0%: ICD-10-PCS | Mod: CPTII,S$GLB,, | Performed by: FAMILY MEDICINE

## 2021-03-01 PROCEDURE — 73130 X-RAY EXAM OF HAND: CPT | Mod: TC,FY,LT

## 2021-03-01 PROCEDURE — 3078F DIAST BP <80 MM HG: CPT | Mod: CPTII,S$GLB,, | Performed by: FAMILY MEDICINE

## 2021-03-01 PROCEDURE — 99999 PR PBB SHADOW E&M-EST. PATIENT-LVL V: CPT | Mod: PBBFAC,,, | Performed by: FAMILY MEDICINE

## 2021-03-01 PROCEDURE — 3072F PR LOW RISK FOR RETINOPATHY: ICD-10-PCS | Mod: S$GLB,,, | Performed by: FAMILY MEDICINE

## 2021-03-01 PROCEDURE — 3008F BODY MASS INDEX DOCD: CPT | Mod: CPTII,S$GLB,, | Performed by: FAMILY MEDICINE

## 2021-03-01 PROCEDURE — 3074F SYST BP LT 130 MM HG: CPT | Mod: CPTII,S$GLB,, | Performed by: FAMILY MEDICINE

## 2021-03-01 PROCEDURE — 3074F PR MOST RECENT SYSTOLIC BLOOD PRESSURE < 130 MM HG: ICD-10-PCS | Mod: CPTII,S$GLB,, | Performed by: FAMILY MEDICINE

## 2021-03-01 PROCEDURE — 3008F PR BODY MASS INDEX (BMI) DOCUMENTED: ICD-10-PCS | Mod: CPTII,S$GLB,, | Performed by: FAMILY MEDICINE

## 2021-03-01 PROCEDURE — 3072F LOW RISK FOR RETINOPATHY: CPT | Mod: S$GLB,,, | Performed by: FAMILY MEDICINE

## 2021-03-01 PROCEDURE — 3046F HEMOGLOBIN A1C LEVEL >9.0%: CPT | Mod: CPTII,S$GLB,, | Performed by: FAMILY MEDICINE

## 2021-03-01 PROCEDURE — 3078F PR MOST RECENT DIASTOLIC BLOOD PRESSURE < 80 MM HG: ICD-10-PCS | Mod: CPTII,S$GLB,, | Performed by: FAMILY MEDICINE

## 2021-03-01 PROCEDURE — 99999 PR PBB SHADOW E&M-EST. PATIENT-LVL V: ICD-10-PCS | Mod: PBBFAC,,, | Performed by: FAMILY MEDICINE

## 2021-03-01 PROCEDURE — 73130 X-RAY EXAM OF HAND: CPT | Mod: 26,LT,, | Performed by: RADIOLOGY

## 2021-03-01 RX ORDER — INSULIN GLARGINE 300 U/ML
90 INJECTION, SOLUTION SUBCUTANEOUS DAILY
Qty: 5 SYRINGE | Refills: 11 | Status: SHIPPED | OUTPATIENT
Start: 2021-03-01 | End: 2021-03-01

## 2021-03-01 RX ORDER — INSULIN GLARGINE 300 U/ML
90 INJECTION, SOLUTION SUBCUTANEOUS DAILY
Qty: 6 ML | Refills: 11 | Status: SHIPPED | OUTPATIENT
Start: 2021-03-01 | End: 2021-11-01 | Stop reason: SDUPTHER

## 2021-03-02 ENCOUNTER — TELEPHONE (OUTPATIENT)
Dept: FAMILY MEDICINE | Facility: CLINIC | Age: 66
End: 2021-03-02

## 2021-03-02 ENCOUNTER — TELEPHONE (OUTPATIENT)
Dept: ADMINISTRATIVE | Facility: HOSPITAL | Age: 66
End: 2021-03-02

## 2021-03-03 ENCOUNTER — IMMUNIZATION (OUTPATIENT)
Dept: PRIMARY CARE CLINIC | Facility: CLINIC | Age: 66
End: 2021-03-03

## 2021-03-03 DIAGNOSIS — Z23 NEED FOR VACCINATION: Primary | ICD-10-CM

## 2021-03-03 PROCEDURE — 0001A PR IMMUNIZ ADMIN, SARS-COV-2 COVID-19 VACC, 30MCG/0.3ML, 1ST DOSE: CPT | Mod: CV19,S$GLB,, | Performed by: INTERNAL MEDICINE

## 2021-03-03 PROCEDURE — 91300 PR SARS-COV- 2 COVID-19 VACCINE, NO PRSV, 30MCG/0.3ML, IM: ICD-10-PCS | Mod: S$GLB,,, | Performed by: INTERNAL MEDICINE

## 2021-03-03 PROCEDURE — 0001A PR IMMUNIZ ADMIN, SARS-COV-2 COVID-19 VACC, 30MCG/0.3ML, 1ST DOSE: ICD-10-PCS | Mod: CV19,S$GLB,, | Performed by: INTERNAL MEDICINE

## 2021-03-03 PROCEDURE — 91300 PR SARS-COV- 2 COVID-19 VACCINE, NO PRSV, 30MCG/0.3ML, IM: CPT | Mod: S$GLB,,, | Performed by: INTERNAL MEDICINE

## 2021-03-03 RX ADMIN — Medication 0.3 ML: at 08:03

## 2021-03-08 ENCOUNTER — HOSPITAL ENCOUNTER (OUTPATIENT)
Dept: RADIOLOGY | Facility: HOSPITAL | Age: 66
Discharge: HOME | End: 2021-03-08
Attending: INTERNAL MEDICINE
Payer: MEDICARE

## 2021-03-08 DIAGNOSIS — Z94.4 S/P LIVER TRANSPLANT: ICD-10-CM

## 2021-03-08 PROCEDURE — 76705 ECHO EXAM OF ABDOMEN: CPT | Mod: 26,59,, | Performed by: RADIOLOGY

## 2021-03-08 PROCEDURE — 93975 VASCULAR STUDY: CPT | Mod: TC

## 2021-03-08 PROCEDURE — 93975 VASCULAR STUDY: CPT | Mod: 26,,, | Performed by: RADIOLOGY

## 2021-03-08 PROCEDURE — 76705 US LIVER TRANSPLANT POST: ICD-10-PCS | Mod: 26,59,, | Performed by: RADIOLOGY

## 2021-03-08 PROCEDURE — 93975 US LIVER TRANSPLANT POST: ICD-10-PCS | Mod: 26,,, | Performed by: RADIOLOGY

## 2021-03-11 ENCOUNTER — TELEPHONE (OUTPATIENT)
Dept: TRANSPLANT | Facility: CLINIC | Age: 66
End: 2021-03-11

## 2021-03-11 DIAGNOSIS — Z94.4 LIVER REPLACED BY TRANSPLANT: Primary | ICD-10-CM

## 2021-03-19 ENCOUNTER — PATIENT OUTREACH (OUTPATIENT)
Dept: ADMINISTRATIVE | Facility: OTHER | Age: 66
End: 2021-03-19

## 2021-03-24 ENCOUNTER — HOSPITAL ENCOUNTER (OUTPATIENT)
Dept: RADIOLOGY | Facility: HOSPITAL | Age: 66
Discharge: HOME | End: 2021-03-24
Attending: NURSE PRACTITIONER
Payer: MEDICARE

## 2021-03-24 ENCOUNTER — OFFICE VISIT (OUTPATIENT)
Dept: PAIN MEDICINE | Facility: CLINIC | Age: 66
End: 2021-03-24
Payer: MEDICARE

## 2021-03-24 VITALS
SYSTOLIC BLOOD PRESSURE: 163 MMHG | WEIGHT: 266.31 LBS | HEART RATE: 71 BPM | DIASTOLIC BLOOD PRESSURE: 97 MMHG | BODY MASS INDEX: 34.19 KG/M2

## 2021-03-24 DIAGNOSIS — M54.16 LUMBAR RADICULOPATHY: ICD-10-CM

## 2021-03-24 DIAGNOSIS — Z98.1 S/P LUMBAR SPINAL FUSION: ICD-10-CM

## 2021-03-24 DIAGNOSIS — M96.1 POSTLAMINECTOMY SYNDROME OF LUMBAR REGION: Primary | ICD-10-CM

## 2021-03-24 DIAGNOSIS — M54.9 BACK PAIN, UNSPECIFIED BACK LOCATION, UNSPECIFIED BACK PAIN LATERALITY, UNSPECIFIED CHRONICITY: ICD-10-CM

## 2021-03-24 DIAGNOSIS — M96.1 POSTLAMINECTOMY SYNDROME OF LUMBAR REGION: ICD-10-CM

## 2021-03-24 PROCEDURE — 99214 PR OFFICE/OUTPT VISIT, EST, LEVL IV, 30-39 MIN: ICD-10-PCS | Mod: S$GLB,,, | Performed by: NURSE PRACTITIONER

## 2021-03-24 PROCEDURE — 1125F AMNT PAIN NOTED PAIN PRSNT: CPT | Mod: S$GLB,,, | Performed by: NURSE PRACTITIONER

## 2021-03-24 PROCEDURE — 1125F PR PAIN SEVERITY QUANTIFIED, PAIN PRESENT: ICD-10-PCS | Mod: S$GLB,,, | Performed by: NURSE PRACTITIONER

## 2021-03-24 PROCEDURE — 72110 XR LUMBAR SPINE COMPLETE 5 VIEW: ICD-10-PCS | Mod: 26,,, | Performed by: INTERNAL MEDICINE

## 2021-03-24 PROCEDURE — 3072F LOW RISK FOR RETINOPATHY: CPT | Mod: S$GLB,,, | Performed by: NURSE PRACTITIONER

## 2021-03-24 PROCEDURE — 99999 PR PBB SHADOW E&M-EST. PATIENT-LVL V: CPT | Mod: PBBFAC,,, | Performed by: NURSE PRACTITIONER

## 2021-03-24 PROCEDURE — 99499 UNLISTED E&M SERVICE: CPT | Mod: S$GLB,,, | Performed by: NURSE PRACTITIONER

## 2021-03-24 PROCEDURE — 99499 RISK ADDL DX/OHS AUDIT: ICD-10-PCS | Mod: S$GLB,,, | Performed by: NURSE PRACTITIONER

## 2021-03-24 PROCEDURE — 3080F PR MOST RECENT DIASTOLIC BLOOD PRESSURE >= 90 MM HG: ICD-10-PCS | Mod: CPTII,S$GLB,, | Performed by: NURSE PRACTITIONER

## 2021-03-24 PROCEDURE — 99214 OFFICE O/P EST MOD 30 MIN: CPT | Mod: S$GLB,,, | Performed by: NURSE PRACTITIONER

## 2021-03-24 PROCEDURE — 72110 X-RAY EXAM L-2 SPINE 4/>VWS: CPT | Mod: 26,,, | Performed by: INTERNAL MEDICINE

## 2021-03-24 PROCEDURE — 72110 X-RAY EXAM L-2 SPINE 4/>VWS: CPT | Mod: TC,FY

## 2021-03-24 PROCEDURE — 3080F DIAST BP >= 90 MM HG: CPT | Mod: CPTII,S$GLB,, | Performed by: NURSE PRACTITIONER

## 2021-03-24 PROCEDURE — 3077F SYST BP >= 140 MM HG: CPT | Mod: CPTII,S$GLB,, | Performed by: NURSE PRACTITIONER

## 2021-03-24 PROCEDURE — 3008F PR BODY MASS INDEX (BMI) DOCUMENTED: ICD-10-PCS | Mod: CPTII,S$GLB,, | Performed by: NURSE PRACTITIONER

## 2021-03-24 PROCEDURE — 3072F PR LOW RISK FOR RETINOPATHY: ICD-10-PCS | Mod: S$GLB,,, | Performed by: NURSE PRACTITIONER

## 2021-03-24 PROCEDURE — 3008F BODY MASS INDEX DOCD: CPT | Mod: CPTII,S$GLB,, | Performed by: NURSE PRACTITIONER

## 2021-03-24 PROCEDURE — 99999 PR PBB SHADOW E&M-EST. PATIENT-LVL V: ICD-10-PCS | Mod: PBBFAC,,, | Performed by: NURSE PRACTITIONER

## 2021-03-24 PROCEDURE — 3077F PR MOST RECENT SYSTOLIC BLOOD PRESSURE >= 140 MM HG: ICD-10-PCS | Mod: CPTII,S$GLB,, | Performed by: NURSE PRACTITIONER

## 2021-03-26 ENCOUNTER — IMMUNIZATION (OUTPATIENT)
Dept: PRIMARY CARE CLINIC | Facility: CLINIC | Age: 66
End: 2021-03-26

## 2021-03-26 DIAGNOSIS — Z23 NEED FOR VACCINATION: Primary | ICD-10-CM

## 2021-03-26 PROCEDURE — 0002A PR IMMUNIZ ADMIN, SARS-COV-2 COVID-19 VACC, 30MCG/0.3ML, 2ND DOSE: CPT | Mod: CV19,S$GLB,, | Performed by: INTERNAL MEDICINE

## 2021-03-26 PROCEDURE — 91300 PR SARS-COV- 2 COVID-19 VACCINE, NO PRSV, 30MCG/0.3ML, IM: CPT | Mod: S$GLB,,, | Performed by: INTERNAL MEDICINE

## 2021-03-26 PROCEDURE — 91300 PR SARS-COV- 2 COVID-19 VACCINE, NO PRSV, 30MCG/0.3ML, IM: ICD-10-PCS | Mod: S$GLB,,, | Performed by: INTERNAL MEDICINE

## 2021-03-26 PROCEDURE — 0002A PR IMMUNIZ ADMIN, SARS-COV-2 COVID-19 VACC, 30MCG/0.3ML, 2ND DOSE: ICD-10-PCS | Mod: CV19,S$GLB,, | Performed by: INTERNAL MEDICINE

## 2021-03-26 RX ADMIN — Medication 0.3 ML: at 09:03

## 2021-04-19 ENCOUNTER — PATIENT OUTREACH (OUTPATIENT)
Dept: ADMINISTRATIVE | Facility: OTHER | Age: 66
End: 2021-04-19

## 2021-04-23 ENCOUNTER — TELEPHONE (OUTPATIENT)
Dept: ADMINISTRATIVE | Facility: HOSPITAL | Age: 66
End: 2021-04-23

## 2021-04-26 ENCOUNTER — LAB VISIT (OUTPATIENT)
Dept: LAB | Facility: HOSPITAL | Age: 66
End: 2021-04-26
Attending: INTERNAL MEDICINE
Payer: MEDICARE

## 2021-04-26 DIAGNOSIS — Z94.4 LIVER REPLACED BY TRANSPLANT: ICD-10-CM

## 2021-04-26 LAB
ALBUMIN SERPL BCP-MCNC: 3.8 G/DL (ref 3.5–5.2)
ALP SERPL-CCNC: 71 U/L (ref 55–135)
ALT SERPL W/O P-5'-P-CCNC: 25 U/L (ref 10–44)
ANION GAP SERPL CALC-SCNC: 7 MMOL/L (ref 8–16)
AST SERPL-CCNC: 19 U/L (ref 10–40)
BASOPHILS # BLD AUTO: 0.05 K/UL (ref 0–0.2)
BASOPHILS NFR BLD: 0.5 % (ref 0–1.9)
BILIRUB SERPL-MCNC: 0.3 MG/DL (ref 0.1–1)
BUN SERPL-MCNC: 12 MG/DL (ref 8–23)
CALCIUM SERPL-MCNC: 8.9 MG/DL (ref 8.7–10.5)
CHLORIDE SERPL-SCNC: 107 MMOL/L (ref 95–110)
CO2 SERPL-SCNC: 27 MMOL/L (ref 23–29)
CREAT SERPL-MCNC: 1.3 MG/DL (ref 0.5–1.4)
DIFFERENTIAL METHOD: ABNORMAL
EOSINOPHIL # BLD AUTO: 0.7 K/UL (ref 0–0.5)
EOSINOPHIL NFR BLD: 7.7 % (ref 0–8)
ERYTHROCYTE [DISTWIDTH] IN BLOOD BY AUTOMATED COUNT: 14.5 % (ref 11.5–14.5)
EST. GFR  (AFRICAN AMERICAN): >60 ML/MIN/1.73 M^2
EST. GFR  (NON AFRICAN AMERICAN): 58.1 ML/MIN/1.73 M^2
GLUCOSE SERPL-MCNC: 121 MG/DL (ref 70–110)
HCT VFR BLD AUTO: 39.7 % (ref 40–54)
HGB BLD-MCNC: 12.3 G/DL (ref 14–18)
IMM GRANULOCYTES # BLD AUTO: 0.01 K/UL (ref 0–0.04)
IMM GRANULOCYTES NFR BLD AUTO: 0.1 % (ref 0–0.5)
LYMPHOCYTES # BLD AUTO: 3.3 K/UL (ref 1–4.8)
LYMPHOCYTES NFR BLD: 36.2 % (ref 18–48)
MCH RBC QN AUTO: 28.7 PG (ref 27–31)
MCHC RBC AUTO-ENTMCNC: 31 G/DL (ref 32–36)
MCV RBC AUTO: 93 FL (ref 82–98)
MONOCYTES # BLD AUTO: 0.7 K/UL (ref 0.3–1)
MONOCYTES NFR BLD: 7.9 % (ref 4–15)
NEUTROPHILS # BLD AUTO: 4.4 K/UL (ref 1.8–7.7)
NEUTROPHILS NFR BLD: 47.6 % (ref 38–73)
NRBC BLD-RTO: 0 /100 WBC
PLATELET # BLD AUTO: 144 K/UL (ref 150–450)
PMV BLD AUTO: 12.8 FL (ref 9.2–12.9)
POTASSIUM SERPL-SCNC: 4.5 MMOL/L (ref 3.5–5.1)
PROT SERPL-MCNC: 7.5 G/DL (ref 6–8.4)
RBC # BLD AUTO: 4.28 M/UL (ref 4.6–6.2)
SODIUM SERPL-SCNC: 141 MMOL/L (ref 136–145)
WBC # BLD AUTO: 9.23 K/UL (ref 3.9–12.7)

## 2021-04-26 PROCEDURE — 36415 COLL VENOUS BLD VENIPUNCTURE: CPT | Mod: PO | Performed by: INTERNAL MEDICINE

## 2021-04-26 PROCEDURE — 85025 COMPLETE CBC W/AUTO DIFF WBC: CPT | Performed by: INTERNAL MEDICINE

## 2021-04-26 PROCEDURE — 80197 ASSAY OF TACROLIMUS: CPT | Performed by: INTERNAL MEDICINE

## 2021-04-26 PROCEDURE — 80053 COMPREHEN METABOLIC PANEL: CPT | Performed by: INTERNAL MEDICINE

## 2021-04-27 LAB — TACROLIMUS BLD-MCNC: 3.3 NG/ML (ref 5–15)

## 2021-04-28 ENCOUNTER — TELEPHONE (OUTPATIENT)
Dept: OPTOMETRY | Facility: CLINIC | Age: 66
End: 2021-04-28

## 2021-04-30 ENCOUNTER — TELEPHONE (OUTPATIENT)
Dept: TRANSPLANT | Facility: CLINIC | Age: 66
End: 2021-04-30

## 2021-04-30 DIAGNOSIS — Z85.05 HISTORY OF LIVER CANCER: Primary | ICD-10-CM

## 2021-05-05 DIAGNOSIS — E11.9 TYPE 2 DIABETES MELLITUS WITHOUT COMPLICATION: ICD-10-CM

## 2021-06-17 ENCOUNTER — TELEPHONE (OUTPATIENT)
Dept: FAMILY MEDICINE | Facility: CLINIC | Age: 66
End: 2021-06-17

## 2021-06-22 ENCOUNTER — TELEPHONE (OUTPATIENT)
Dept: FAMILY MEDICINE | Facility: CLINIC | Age: 66
End: 2021-06-22

## 2021-06-22 DIAGNOSIS — M79.642 LEFT HAND PAIN: Primary | ICD-10-CM

## 2021-06-25 NOTE — TELEPHONE ENCOUNTER
COLONOSCOPY: SUTAB       Re: Yvette Jarvis   [address]  [place] [address]    Provider: Aric Anthony MD    Your colon must be cleaned of stool in order to have a good view. You should follow these directions in order to have a successful colonoscopy.    IF YOU DO  NOT HAVE AN ADULT TO DRIVE YOU HOME, YOUR SURGERY WILL BE CANCELLED.      If you have any up coming cardiac testing, your gastroenterology procedure will need to be reschedule until all cardiac testing has been completed per anesthesiologist. Failure to complete any cardiac testing may result in cancellation of your surgery.    Your exam is scheduled as an outpatient procedure on:     Day:  Friday    Date: SEPTEMBER 10 2021    Arrival Time: 9:30 AM    Location: [facility], [address] [place] [id number] - Directions: Come all the way into the main lobby of the building and take the interior elevator down to the lower level. Turn left off the elevator and walk straight ahead to the Endoscopy reception area.     You will need the following in order to complete your prep:  · SUTAB Bowel Prep QTY 24 TABLETS    Your prep kit has been has been sent to    [id number] [place], [place] - [address] [place] [address] AT [facility] OF [place] [address] [place] [address]  [address] [place] [address]  Phone: [phone] Fax: [phone]      Please  the kit today.    7 days before colonoscopy: Review your colonoscopy instructions. (Instructions can also be found on [facility])  • Arrange a ride: you will be given medicine that makes you relax and sleepy, so you cannot drive a car or take a bus/taxi home. If you arrive without an 18 year or older escort, your procedure will need to be rescheduled. Your  MUST stay with you for the duration of the procedure.  • Stop eating popcorn, nuts, flax/sesame seeds, or any food that contains seeds    3 BUSINESS DAYS BEFORE your procedure:  • Confirm your ride. Your  ----- Message from Jay Gonzalez sent at 2/13/2019 11:59 AM CST -----  Contact: Elma (Breanna) #change of pharm#      Can the clinic reply in [facility]: Elma Mcclain) #change of pharm#      Please refill the medication(s) listed below. Please call the patient when the prescription(s) is ready for  at this phone number         Medication #1 oxyCODONE (ROXICODONE) 15 MG Tab    #change of pharm#  Preferred Pharmacy: [facility] . [phone]    MUST stay with you for the duration of the procedure.  • If you need to cancel your appointment, call your doctor or nurse at [phone] to avoid a cancellation fee.  • Stop taking all anti-inflammatory medicines. These include: Advil, Aleve, Naprosyn, (Tylenol is okay to take)  • Review the diet you need to follow for the next 2 days. Plan your meals according to this diet.    1 DAY BEFORE the procedure:   •Start a strict, clear liquid diet from the moment you wake. A clear liquid diet can include: Apple juice, white grape juice, and white cranberry juice; Beef or chicken broths that are clear - no noodles, vegetables, rice, etc; Tea and coffee without milk; -Soda pop, Gatorade, Wali-Aid and various -Jell-O Flavors (any color except red or purple)  •Avoid: juices with pulp, milk, cream, solid food, alcohol, gum and hard candy.    6:00pm:  Open one bottle of 12 tablets.   Fill the provided container with 16 ounces of water (up to the fill line).   Swallow each tablet with a sip of water and drink the entire amount over 15 to 20 minutes.   Approximately one hour after the last tablet is ingested, fill the provided container a second time with 16 ounces of water (up to the fill line) and drink the entire amount over 30 minutes.   Approximately 30 minutes after finishing the second container of water, fill the provided container again with 16 ounces of water (up to the fill line) and drink the entire amount over 30 minutes.   If you experience preparation-related symptoms (e.g. nausea, bloating, cramping), pause or slow the rate of drinking the additional water until symptoms diminish.    • The tablets will cause you to have many bowel movements. This is needed so that you will have a clean colon for the procedure. Plan to stay home for the duration of the prep.  If you have problems completing this preparation, call your doctor or nurse at [phone].     Continue to drink clear liquids throughout the  evening but do not eat any solid food.    ON THE DAY OF the procedure:   2:30AM: Open one bottle of 12 tablets.   Fill the provided container with 16 ounces of water (up to the fill line).   Swallow each tablet with a sip of water and drink the entire amount over 15 to 20 minutes.   Approximately one hour after the last tablet is ingested, fill the provided container a second time with 16 ounces of water (up to the fill line) and drink the entire amount over 30 minutes.   Approximately 30 minutes after finishing the second container of water, fill the provided container again with 16 ounces of water (up to the fill line) and drink the entire amount over 30 minutes.   If you experience preparation-related symptoms (e.g. nausea, bloating, cramping), pause or slow the rate of drinking the additional water until symptoms diminish.  Stop all clear liquids including water at  5:30 AM    • You cannot have anything by mouth including water at this time.     Please call our office if you have been prescribed any new medication between now and your procedure    If you are still having solid/formed stools please call the doctor's office at [phone].   • Bring your escort/ with you to your appointment. Remember the escort must be 18 years or older.    Bring photo ID, current insurance card, inhalers or nebulizers and an updated list of your current medications with you to your procedure. You will be at the Clinic or hospital approximately 2-3 hours. Do not wear any jewelry, contact lenses, or bring any valuables. A pregnancy test is required if you are female and under the age of 56. Be aware that there is a chance that your procedure time may be changed.

## 2021-07-12 ENCOUNTER — TELEPHONE (OUTPATIENT)
Dept: ORTHOPEDICS | Facility: CLINIC | Age: 66
End: 2021-07-12

## 2021-07-12 ENCOUNTER — PATIENT OUTREACH (OUTPATIENT)
Dept: ADMINISTRATIVE | Facility: OTHER | Age: 66
End: 2021-07-12

## 2021-07-12 DIAGNOSIS — G89.4 CHRONIC PAIN SYNDROME: ICD-10-CM

## 2021-07-12 DIAGNOSIS — M43.10 ACQUIRED SPONDYLOLISTHESIS: ICD-10-CM

## 2021-07-12 DIAGNOSIS — F11.90 CHRONIC, CONTINUOUS USE OF OPIOIDS: ICD-10-CM

## 2021-07-12 DIAGNOSIS — M79.642 LEFT HAND PAIN: Primary | ICD-10-CM

## 2021-07-12 DIAGNOSIS — M96.1 POSTLAMINECTOMY SYNDROME OF LUMBAR REGION: ICD-10-CM

## 2021-07-12 DIAGNOSIS — M54.16 LUMBAR RADICULOPATHY: ICD-10-CM

## 2021-07-12 RX ORDER — GABAPENTIN 800 MG/1
800 TABLET ORAL 3 TIMES DAILY
Qty: 90 TABLET | Refills: 0 | OUTPATIENT
Start: 2021-07-12

## 2021-07-13 ENCOUNTER — OFFICE VISIT (OUTPATIENT)
Dept: ORTHOPEDICS | Facility: CLINIC | Age: 66
End: 2021-07-13
Payer: MEDICARE

## 2021-07-13 ENCOUNTER — HOSPITAL ENCOUNTER (OUTPATIENT)
Dept: RADIOLOGY | Facility: HOSPITAL | Age: 66
Discharge: HOME | End: 2021-07-13
Attending: ORTHOPAEDIC SURGERY
Payer: MEDICARE

## 2021-07-13 DIAGNOSIS — M79.642 LEFT HAND PAIN: ICD-10-CM

## 2021-07-13 DIAGNOSIS — G56.02 CARPAL TUNNEL SYNDROME OF LEFT WRIST: ICD-10-CM

## 2021-07-13 PROCEDURE — 3072F PR LOW RISK FOR RETINOPATHY: ICD-10-PCS | Mod: S$GLB,,, | Performed by: ORTHOPAEDIC SURGERY

## 2021-07-13 PROCEDURE — 20526 THER INJECTION CARP TUNNEL: CPT | Mod: LT,S$GLB,, | Performed by: ORTHOPAEDIC SURGERY

## 2021-07-13 PROCEDURE — 99499 UNLISTED E&M SERVICE: CPT | Mod: S$GLB,,, | Performed by: ORTHOPAEDIC SURGERY

## 2021-07-13 PROCEDURE — 73130 XR HAND COMPLETE 3 VIEW LEFT: ICD-10-PCS | Mod: 26,LT,, | Performed by: RADIOLOGY

## 2021-07-13 PROCEDURE — 99203 PR OFFICE/OUTPT VISIT, NEW, LEVL III, 30-44 MIN: ICD-10-PCS | Mod: 25,S$GLB,, | Performed by: ORTHOPAEDIC SURGERY

## 2021-07-13 PROCEDURE — 99999 PR PBB SHADOW E&M-EST. PATIENT-LVL III: CPT | Mod: PBBFAC,,, | Performed by: ORTHOPAEDIC SURGERY

## 2021-07-13 PROCEDURE — 99203 OFFICE O/P NEW LOW 30 MIN: CPT | Mod: 25,S$GLB,, | Performed by: ORTHOPAEDIC SURGERY

## 2021-07-13 PROCEDURE — 99999 PR PBB SHADOW E&M-EST. PATIENT-LVL III: ICD-10-PCS | Mod: PBBFAC,,, | Performed by: ORTHOPAEDIC SURGERY

## 2021-07-13 PROCEDURE — 73130 X-RAY EXAM OF HAND: CPT | Mod: 26,LT,, | Performed by: RADIOLOGY

## 2021-07-13 PROCEDURE — 20526 PR INJECT CARPAL TUNNEL: ICD-10-PCS | Mod: LT,S$GLB,, | Performed by: ORTHOPAEDIC SURGERY

## 2021-07-13 PROCEDURE — 3072F LOW RISK FOR RETINOPATHY: CPT | Mod: S$GLB,,, | Performed by: ORTHOPAEDIC SURGERY

## 2021-07-13 PROCEDURE — 73130 X-RAY EXAM OF HAND: CPT | Mod: TC,PN,LT

## 2021-07-13 PROCEDURE — 99499 RISK ADDL DX/OHS AUDIT: ICD-10-PCS | Mod: S$GLB,,, | Performed by: ORTHOPAEDIC SURGERY

## 2021-07-13 RX ORDER — TRIAMCINOLONE ACETONIDE 40 MG/ML
20 INJECTION, SUSPENSION INTRA-ARTICULAR; INTRAMUSCULAR
Status: COMPLETED | OUTPATIENT
Start: 2021-07-13 | End: 2021-07-13

## 2021-07-13 RX ADMIN — TRIAMCINOLONE ACETONIDE 20 MG: 40 INJECTION, SUSPENSION INTRA-ARTICULAR; INTRAMUSCULAR at 09:07

## 2021-07-14 DIAGNOSIS — F11.90 CHRONIC, CONTINUOUS USE OF OPIOIDS: ICD-10-CM

## 2021-07-14 DIAGNOSIS — G89.4 CHRONIC PAIN SYNDROME: ICD-10-CM

## 2021-07-14 DIAGNOSIS — M96.1 POSTLAMINECTOMY SYNDROME OF LUMBAR REGION: ICD-10-CM

## 2021-07-14 DIAGNOSIS — M54.16 LUMBAR RADICULOPATHY: ICD-10-CM

## 2021-07-14 DIAGNOSIS — M43.10 ACQUIRED SPONDYLOLISTHESIS: ICD-10-CM

## 2021-07-14 RX ORDER — GABAPENTIN 800 MG/1
800 TABLET ORAL 3 TIMES DAILY
Qty: 90 TABLET | Refills: 3 | Status: SHIPPED | OUTPATIENT
Start: 2021-07-14 | End: 2021-12-06

## 2021-07-14 RX ORDER — LISINOPRIL 40 MG/1
40 TABLET ORAL DAILY
Qty: 90 TABLET | Refills: 3 | Status: SHIPPED | OUTPATIENT
Start: 2021-07-14 | End: 2022-06-23 | Stop reason: SDUPTHER

## 2021-07-26 ENCOUNTER — LAB VISIT (OUTPATIENT)
Dept: LAB | Facility: HOSPITAL | Age: 66
End: 2021-07-26
Attending: INTERNAL MEDICINE
Payer: MEDICAID

## 2021-07-26 DIAGNOSIS — Z85.05 HISTORY OF LIVER CANCER: ICD-10-CM

## 2021-07-26 DIAGNOSIS — Z94.4 LIVER REPLACED BY TRANSPLANT: ICD-10-CM

## 2021-07-26 LAB
ALBUMIN SERPL BCP-MCNC: 3.6 G/DL (ref 3.5–5.2)
ALP SERPL-CCNC: 109 U/L (ref 55–135)
ALT SERPL W/O P-5'-P-CCNC: 23 U/L (ref 10–44)
ANION GAP SERPL CALC-SCNC: 10 MMOL/L (ref 8–16)
AST SERPL-CCNC: 16 U/L (ref 10–40)
BASOPHILS # BLD AUTO: 0.04 K/UL (ref 0–0.2)
BASOPHILS NFR BLD: 0.4 % (ref 0–1.9)
BILIRUB SERPL-MCNC: 0.4 MG/DL (ref 0.1–1)
BUN SERPL-MCNC: 24 MG/DL (ref 8–23)
CALCIUM SERPL-MCNC: 9.8 MG/DL (ref 8.7–10.5)
CHLORIDE SERPL-SCNC: 103 MMOL/L (ref 95–110)
CO2 SERPL-SCNC: 22 MMOL/L (ref 23–29)
CREAT SERPL-MCNC: 1.6 MG/DL (ref 0.5–1.4)
DIFFERENTIAL METHOD: ABNORMAL
EOSINOPHIL # BLD AUTO: 0.6 K/UL (ref 0–0.5)
EOSINOPHIL NFR BLD: 6.1 % (ref 0–8)
ERYTHROCYTE [DISTWIDTH] IN BLOOD BY AUTOMATED COUNT: 14 % (ref 11.5–14.5)
EST. GFR  (AFRICAN AMERICAN): 52.2 ML/MIN/1.73 M^2
EST. GFR  (NON AFRICAN AMERICAN): 45.2 ML/MIN/1.73 M^2
GLUCOSE SERPL-MCNC: 356 MG/DL (ref 70–110)
HCT VFR BLD AUTO: 39.4 % (ref 40–54)
HGB BLD-MCNC: 12.7 G/DL (ref 14–18)
IMM GRANULOCYTES # BLD AUTO: 0.03 K/UL (ref 0–0.04)
IMM GRANULOCYTES NFR BLD AUTO: 0.3 % (ref 0–0.5)
LYMPHOCYTES # BLD AUTO: 3.8 K/UL (ref 1–4.8)
LYMPHOCYTES NFR BLD: 37.6 % (ref 18–48)
MCH RBC QN AUTO: 29 PG (ref 27–31)
MCHC RBC AUTO-ENTMCNC: 32.2 G/DL (ref 32–36)
MCV RBC AUTO: 90 FL (ref 82–98)
MONOCYTES # BLD AUTO: 0.7 K/UL (ref 0.3–1)
MONOCYTES NFR BLD: 7.2 % (ref 4–15)
NEUTROPHILS # BLD AUTO: 4.9 K/UL (ref 1.8–7.7)
NEUTROPHILS NFR BLD: 48.4 % (ref 38–73)
NRBC BLD-RTO: 0 /100 WBC
PLATELET # BLD AUTO: 139 K/UL (ref 150–450)
PMV BLD AUTO: 12.9 FL (ref 9.2–12.9)
POTASSIUM SERPL-SCNC: 4.7 MMOL/L (ref 3.5–5.1)
PROT SERPL-MCNC: 7.3 G/DL (ref 6–8.4)
RBC # BLD AUTO: 4.38 M/UL (ref 4.6–6.2)
SODIUM SERPL-SCNC: 135 MMOL/L (ref 136–145)
WBC # BLD AUTO: 10.05 K/UL (ref 3.9–12.7)

## 2021-07-26 PROCEDURE — 82105 ALPHA-FETOPROTEIN SERUM: CPT | Performed by: INTERNAL MEDICINE

## 2021-07-26 PROCEDURE — 36415 COLL VENOUS BLD VENIPUNCTURE: CPT | Mod: PO | Performed by: INTERNAL MEDICINE

## 2021-07-26 PROCEDURE — 80197 ASSAY OF TACROLIMUS: CPT | Performed by: INTERNAL MEDICINE

## 2021-07-26 PROCEDURE — 80053 COMPREHEN METABOLIC PANEL: CPT | Performed by: INTERNAL MEDICINE

## 2021-07-26 PROCEDURE — 85025 COMPLETE CBC W/AUTO DIFF WBC: CPT | Performed by: INTERNAL MEDICINE

## 2021-07-27 LAB
AFP SERPL-MCNC: 4.4 NG/ML (ref 0–8.4)
TACROLIMUS BLD-MCNC: 4.7 NG/ML (ref 5–15)
TACROLIMUS, NORMALIZED: 4.3 NG/ML (ref 5–15)

## 2021-07-30 ENCOUNTER — TELEPHONE (OUTPATIENT)
Dept: TRANSPLANT | Facility: CLINIC | Age: 66
End: 2021-07-30

## 2021-07-30 DIAGNOSIS — Z94.4 LIVER REPLACED BY TRANSPLANT: Primary | ICD-10-CM

## 2021-07-30 DIAGNOSIS — Z85.05 HISTORY OF LIVER CANCER: ICD-10-CM

## 2021-08-10 RX ORDER — PEN NEEDLE, DIABETIC 30 GX3/16"
NEEDLE, DISPOSABLE MISCELLANEOUS
Qty: 100 EACH | Refills: 11 | OUTPATIENT
Start: 2021-08-10 | End: 2022-09-12

## 2021-08-12 ENCOUNTER — PATIENT OUTREACH (OUTPATIENT)
Dept: ADMINISTRATIVE | Facility: OTHER | Age: 66
End: 2021-08-12

## 2021-08-13 ENCOUNTER — HOSPITAL ENCOUNTER (EMERGENCY)
Facility: HOSPITAL | Age: 66
Discharge: HOME | End: 2021-08-13
Attending: EMERGENCY MEDICINE
Payer: MEDICARE

## 2021-08-13 ENCOUNTER — OFFICE VISIT (OUTPATIENT)
Dept: ORTHOPEDICS | Facility: CLINIC | Age: 66
End: 2021-08-13
Payer: MEDICARE

## 2021-08-13 VITALS
WEIGHT: 220 LBS | HEIGHT: 73 IN | RESPIRATION RATE: 18 BRPM | DIASTOLIC BLOOD PRESSURE: 86 MMHG | TEMPERATURE: 98 F | HEART RATE: 84 BPM | OXYGEN SATURATION: 99 % | BODY MASS INDEX: 29.16 KG/M2 | SYSTOLIC BLOOD PRESSURE: 138 MMHG

## 2021-08-13 VITALS — WEIGHT: 266 LBS | BODY MASS INDEX: 34.15 KG/M2

## 2021-08-13 DIAGNOSIS — G56.02 CARPAL TUNNEL SYNDROME OF LEFT WRIST: Primary | ICD-10-CM
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
## 2021-08-13 DIAGNOSIS — S29.011A MUSCLE STRAIN OF CHEST WALL, INITIAL ENCOUNTER: ICD-10-CM

## 2021-08-13 DIAGNOSIS — S29.9XXA CHEST WALL INJURY, INITIAL ENCOUNTER: Primary | ICD-10-CM

## 2021-08-13 PROCEDURE — 1159F PR MEDICATION LIST DOCUMENTED IN MEDICAL RECORD: ICD-10-PCS | Mod: CPTII,S$GLB,, | Performed by: PHYSICIAN ASSISTANT

## 2021-08-13 PROCEDURE — 25000003 PHARM REV CODE 250: Performed by: EMERGENCY MEDICINE

## 2021-08-13 PROCEDURE — 3072F PR LOW RISK FOR RETINOPATHY: ICD-10-PCS | Mod: CPTII,S$GLB,, | Performed by: PHYSICIAN ASSISTANT

## 2021-08-13 PROCEDURE — 1160F RVW MEDS BY RX/DR IN RCRD: CPT | Mod: CPTII,S$GLB,, | Performed by: PHYSICIAN ASSISTANT

## 2021-08-13 PROCEDURE — 1125F PR PAIN SEVERITY QUANTIFIED, PAIN PRESENT: ICD-10-PCS | Mod: CPTII,S$GLB,, | Performed by: PHYSICIAN ASSISTANT

## 2021-08-13 PROCEDURE — 99213 PR OFFICE/OUTPT VISIT, EST, LEVL III, 20-29 MIN: ICD-10-PCS | Mod: S$GLB,,, | Performed by: PHYSICIAN ASSISTANT

## 2021-08-13 PROCEDURE — 99283 EMERGENCY DEPT VISIT LOW MDM: CPT

## 2021-08-13 PROCEDURE — 3008F PR BODY MASS INDEX (BMI) DOCUMENTED: ICD-10-PCS | Mod: CPTII,S$GLB,, | Performed by: PHYSICIAN ASSISTANT

## 2021-08-13 PROCEDURE — 99213 OFFICE O/P EST LOW 20 MIN: CPT | Mod: S$GLB,,, | Performed by: PHYSICIAN ASSISTANT

## 2021-08-13 PROCEDURE — 99999 PR PBB SHADOW E&M-EST. PATIENT-LVL II: ICD-10-PCS | Mod: PBBFAC,,, | Performed by: PHYSICIAN ASSISTANT

## 2021-08-13 PROCEDURE — 3072F LOW RISK FOR RETINOPATHY: CPT | Mod: CPTII,S$GLB,, | Performed by: PHYSICIAN ASSISTANT

## 2021-08-13 PROCEDURE — 1159F MED LIST DOCD IN RCRD: CPT | Mod: CPTII,S$GLB,, | Performed by: PHYSICIAN ASSISTANT

## 2021-08-13 PROCEDURE — 3008F BODY MASS INDEX DOCD: CPT | Mod: CPTII,S$GLB,, | Performed by: PHYSICIAN ASSISTANT

## 2021-08-13 PROCEDURE — 1125F AMNT PAIN NOTED PAIN PRSNT: CPT | Mod: CPTII,S$GLB,, | Performed by: PHYSICIAN ASSISTANT

## 2021-08-13 PROCEDURE — 1160F PR REVIEW ALL MEDS BY PRESCRIBER/CLIN PHARMACIST DOCUMENTED: ICD-10-PCS | Mod: CPTII,S$GLB,, | Performed by: PHYSICIAN ASSISTANT

## 2021-08-13 PROCEDURE — 99999 PR PBB SHADOW E&M-EST. PATIENT-LVL II: CPT | Mod: PBBFAC,,, | Performed by: PHYSICIAN ASSISTANT

## 2021-08-13 RX ORDER — TRAMADOL HYDROCHLORIDE AND ACETAMINOPHEN 37.5; 325 MG/1; MG/1
1 TABLET, FILM COATED ORAL EVERY 6 HOURS PRN
Qty: 9 TABLET | Refills: 0 | Status: ON HOLD | OUTPATIENT
Start: 2021-08-13 | End: 2021-10-16 | Stop reason: HOSPADM

## 2021-08-13 RX ORDER — TRAMADOL HYDROCHLORIDE 50 MG/1
100 TABLET ORAL
Status: COMPLETED | OUTPATIENT
Start: 2021-08-13 | End: 2021-08-13

## 2021-08-13 RX ADMIN — TRAMADOL HYDROCHLORIDE 100 MG: 50 TABLET, FILM COATED ORAL at 09:08

## 2021-08-26 ENCOUNTER — TELEPHONE (OUTPATIENT)
Dept: ORTHOPEDICS | Facility: CLINIC | Age: 66
End: 2021-08-26

## 2021-09-13 ENCOUNTER — TELEPHONE (OUTPATIENT)
Dept: TRANSPLANT | Facility: CLINIC | Age: 66
End: 2021-09-13

## 2021-09-13 ENCOUNTER — HOSPITAL ENCOUNTER (OUTPATIENT)
Dept: RADIOLOGY | Facility: HOSPITAL | Age: 66
Discharge: HOME | End: 2021-09-13
Attending: INTERNAL MEDICINE
Payer: MEDICARE

## 2021-09-13 DIAGNOSIS — Z94.4 LIVER REPLACED BY TRANSPLANT: ICD-10-CM

## 2021-09-13 PROCEDURE — 76705 ECHO EXAM OF ABDOMEN: CPT | Mod: 26,59,, | Performed by: RADIOLOGY

## 2021-09-13 PROCEDURE — 93975 US LIVER TRANSPLANT POST: ICD-10-PCS | Mod: 26,,, | Performed by: RADIOLOGY

## 2021-09-13 PROCEDURE — 93975 VASCULAR STUDY: CPT | Mod: TC

## 2021-09-13 PROCEDURE — 76705 US LIVER TRANSPLANT POST: ICD-10-PCS | Mod: 26,59,, | Performed by: RADIOLOGY

## 2021-09-13 PROCEDURE — 93975 VASCULAR STUDY: CPT | Mod: 26,,, | Performed by: RADIOLOGY

## 2021-09-14 ENCOUNTER — TELEPHONE (OUTPATIENT)
Dept: ORTHOPEDICS | Facility: CLINIC | Age: 66
End: 2021-09-14

## 2021-09-14 ENCOUNTER — OFFICE VISIT (OUTPATIENT)
Dept: ORTHOPEDICS | Facility: CLINIC | Age: 66
End: 2021-09-14
Payer: MEDICARE

## 2021-09-14 DIAGNOSIS — G56.02 CARPAL TUNNEL SYNDROME OF LEFT WRIST: Primary | ICD-10-CM

## 2021-09-14 PROCEDURE — 4010F PR ACE/ARB THEARPY RXD/TAKEN: ICD-10-PCS | Mod: CPTII,95,, | Performed by: PHYSICIAN ASSISTANT

## 2021-09-14 PROCEDURE — 4010F ACE/ARB THERAPY RXD/TAKEN: CPT | Mod: CPTII,95,, | Performed by: PHYSICIAN ASSISTANT

## 2021-09-14 PROCEDURE — 99441 PR PHYSICIAN TELEPHONE EVALUATION 5-10 MIN: ICD-10-PCS | Mod: 95,,, | Performed by: PHYSICIAN ASSISTANT

## 2021-09-14 PROCEDURE — 99441 PR PHYSICIAN TELEPHONE EVALUATION 5-10 MIN: CPT | Mod: 95,,, | Performed by: PHYSICIAN ASSISTANT

## 2021-09-14 PROCEDURE — 1160F RVW MEDS BY RX/DR IN RCRD: CPT | Mod: CPTII,95,, | Performed by: PHYSICIAN ASSISTANT

## 2021-09-14 PROCEDURE — 3072F PR LOW RISK FOR RETINOPATHY: ICD-10-PCS | Mod: CPTII,95,, | Performed by: PHYSICIAN ASSISTANT

## 2021-09-14 PROCEDURE — 1160F PR REVIEW ALL MEDS BY PRESCRIBER/CLIN PHARMACIST DOCUMENTED: ICD-10-PCS | Mod: CPTII,95,, | Performed by: PHYSICIAN ASSISTANT

## 2021-09-14 PROCEDURE — 1159F PR MEDICATION LIST DOCUMENTED IN MEDICAL RECORD: ICD-10-PCS | Mod: CPTII,95,, | Performed by: PHYSICIAN ASSISTANT

## 2021-09-14 PROCEDURE — 3072F LOW RISK FOR RETINOPATHY: CPT | Mod: CPTII,95,, | Performed by: PHYSICIAN ASSISTANT

## 2021-09-14 PROCEDURE — 1159F MED LIST DOCD IN RCRD: CPT | Mod: CPTII,95,, | Performed by: PHYSICIAN ASSISTANT

## 2021-09-15 ENCOUNTER — TELEPHONE (OUTPATIENT)
Dept: ORTHOPEDICS | Facility: CLINIC | Age: 66
End: 2021-09-15

## 2021-09-15 ENCOUNTER — OFFICE VISIT (OUTPATIENT)
Dept: ORTHOPEDICS | Facility: CLINIC | Age: 66
End: 2021-09-15
Payer: MEDICARE

## 2021-09-15 VITALS — BODY MASS INDEX: 29.03 KG/M2 | WEIGHT: 220 LBS

## 2021-09-15 DIAGNOSIS — G56.02 CARPAL TUNNEL SYNDROME OF LEFT WRIST: Primary | ICD-10-CM

## 2021-09-15 DIAGNOSIS — Z41.9 ELECTIVE SURGERY: ICD-10-CM

## 2021-09-15 PROCEDURE — 1159F PR MEDICATION LIST DOCUMENTED IN MEDICAL RECORD: ICD-10-PCS | Mod: CPTII,S$GLB,, | Performed by: ORTHOPAEDIC SURGERY

## 2021-09-15 PROCEDURE — 99214 OFFICE O/P EST MOD 30 MIN: CPT | Mod: S$GLB,,, | Performed by: ORTHOPAEDIC SURGERY

## 2021-09-15 PROCEDURE — 3008F PR BODY MASS INDEX (BMI) DOCUMENTED: ICD-10-PCS | Mod: CPTII,S$GLB,, | Performed by: ORTHOPAEDIC SURGERY

## 2021-09-15 PROCEDURE — 3072F LOW RISK FOR RETINOPATHY: CPT | Mod: CPTII,S$GLB,, | Performed by: ORTHOPAEDIC SURGERY

## 2021-09-15 PROCEDURE — 99999 PR PBB SHADOW E&M-EST. PATIENT-LVL II: CPT | Mod: PBBFAC,,, | Performed by: ORTHOPAEDIC SURGERY

## 2021-09-15 PROCEDURE — 3008F BODY MASS INDEX DOCD: CPT | Mod: CPTII,S$GLB,, | Performed by: ORTHOPAEDIC SURGERY

## 2021-09-15 PROCEDURE — 1159F MED LIST DOCD IN RCRD: CPT | Mod: CPTII,S$GLB,, | Performed by: ORTHOPAEDIC SURGERY

## 2021-09-15 PROCEDURE — 3072F PR LOW RISK FOR RETINOPATHY: ICD-10-PCS | Mod: CPTII,S$GLB,, | Performed by: ORTHOPAEDIC SURGERY

## 2021-09-15 PROCEDURE — 4010F ACE/ARB THERAPY RXD/TAKEN: CPT | Mod: CPTII,S$GLB,, | Performed by: ORTHOPAEDIC SURGERY

## 2021-09-15 PROCEDURE — 4010F PR ACE/ARB THEARPY RXD/TAKEN: ICD-10-PCS | Mod: CPTII,S$GLB,, | Performed by: ORTHOPAEDIC SURGERY

## 2021-09-15 PROCEDURE — 99214 PR OFFICE/OUTPT VISIT, EST, LEVL IV, 30-39 MIN: ICD-10-PCS | Mod: S$GLB,,, | Performed by: ORTHOPAEDIC SURGERY

## 2021-09-15 PROCEDURE — 99999 PR PBB SHADOW E&M-EST. PATIENT-LVL II: ICD-10-PCS | Mod: PBBFAC,,, | Performed by: ORTHOPAEDIC SURGERY

## 2021-09-15 RX ORDER — HYDROCODONE BITARTRATE AND ACETAMINOPHEN 5; 325 MG/1; MG/1
1 TABLET ORAL EVERY 6 HOURS PRN
Qty: 40 TABLET | Refills: 0 | Status: SHIPPED | OUTPATIENT
Start: 2021-09-15 | End: 2021-09-25

## 2021-10-11 ENCOUNTER — HOSPITAL ENCOUNTER (INPATIENT)
Facility: HOSPITAL | Age: 66
LOS: 3 days | Discharge: HOME | DRG: 603 | End: 2021-10-16
Attending: EMERGENCY MEDICINE | Admitting: INTERNAL MEDICINE
Payer: MEDICARE

## 2021-10-11 DIAGNOSIS — Z94.4 S/P LIVER TRANSPLANT: ICD-10-CM

## 2021-10-11 DIAGNOSIS — I73.9 PAD (PERIPHERAL ARTERY DISEASE): ICD-10-CM

## 2021-10-11 DIAGNOSIS — L08.9 DIABETIC FOOT INFECTION: Primary | ICD-10-CM

## 2021-10-11 DIAGNOSIS — E11.628 DIABETIC FOOT INFECTION: Primary | ICD-10-CM

## 2021-10-11 DIAGNOSIS — E11.69 HYPERLIPIDEMIA ASSOCIATED WITH TYPE 2 DIABETES MELLITUS: ICD-10-CM

## 2021-10-11 DIAGNOSIS — S91.331D PENETRATING WOUND OF RIGHT FOOT, SUBSEQUENT ENCOUNTER: ICD-10-CM

## 2021-10-11 DIAGNOSIS — S91.332A PENETRATING WOUND OF LEFT FOOT, INITIAL ENCOUNTER: ICD-10-CM

## 2021-10-11 DIAGNOSIS — E55.9 VITAMIN D DEFICIENCY: ICD-10-CM

## 2021-10-11 DIAGNOSIS — E11.65 UNCONTROLLED TYPE 2 DIABETES MELLITUS WITH HYPERGLYCEMIA: ICD-10-CM

## 2021-10-11 DIAGNOSIS — R73.9 HYPERGLYCEMIA: ICD-10-CM

## 2021-10-11 DIAGNOSIS — S91.332A PENETRATING FOOT WOUND, LEFT, INITIAL ENCOUNTER: ICD-10-CM

## 2021-10-11 DIAGNOSIS — E78.5 HYPERLIPIDEMIA ASSOCIATED WITH TYPE 2 DIABETES MELLITUS: ICD-10-CM

## 2021-10-11 DIAGNOSIS — E83.39 HYPOPHOSPHATEMIA: ICD-10-CM

## 2021-10-11 DIAGNOSIS — R07.9 CHEST PAIN: ICD-10-CM

## 2021-10-11 PROBLEM — S91.339A PENETRATING FOOT WOUND: Status: ACTIVE | Noted: 2021-10-11

## 2021-10-11 LAB
ALBUMIN SERPL BCP-MCNC: 3.6 G/DL (ref 3.5–5.2)
ALP SERPL-CCNC: 112 U/L (ref 55–135)
ALT SERPL W/O P-5'-P-CCNC: 25 U/L (ref 10–44)
ANION GAP SERPL CALC-SCNC: 9 MMOL/L (ref 8–16)
AST SERPL-CCNC: 17 U/L (ref 10–40)
BASOPHILS # BLD AUTO: 0.04 K/UL (ref 0–0.2)
BASOPHILS NFR BLD: 0.5 % (ref 0–1.9)
BILIRUB SERPL-MCNC: 0.3 MG/DL (ref 0.1–1)
BUN SERPL-MCNC: 20 MG/DL (ref 8–23)
CALCIUM SERPL-MCNC: 8.7 MG/DL (ref 8.7–10.5)
CHLORIDE SERPL-SCNC: 107 MMOL/L (ref 95–110)
CHOLEST SERPL-MCNC: 125 MG/DL (ref 120–199)
CHOLEST/HDLC SERPL: 4.6 {RATIO} (ref 2–5)
CO2 SERPL-SCNC: 22 MMOL/L (ref 23–29)
CREAT SERPL-MCNC: 1.6 MG/DL (ref 0.5–1.4)
CRP SERPL-MCNC: 14.1 MG/L (ref 0–8.2)
CTP QC/QA: YES
DIFFERENTIAL METHOD: ABNORMAL
EOSINOPHIL # BLD AUTO: 0.5 K/UL (ref 0–0.5)
EOSINOPHIL NFR BLD: 6.3 % (ref 0–8)
ERYTHROCYTE [DISTWIDTH] IN BLOOD BY AUTOMATED COUNT: 13.2 % (ref 11.5–14.5)
ERYTHROCYTE [SEDIMENTATION RATE] IN BLOOD BY WESTERGREN METHOD: 35 MM/HR (ref 0–10)
EST. GFR  (AFRICAN AMERICAN): 52 ML/MIN/1.73 M^2
EST. GFR  (NON AFRICAN AMERICAN): 45 ML/MIN/1.73 M^2
ESTIMATED AVG GLUCOSE: 243 MG/DL (ref 68–131)
GLUCOSE SERPL-MCNC: 441 MG/DL (ref 70–110)
HBA1C MFR BLD: 10.1 % (ref 4–5.6)
HCT VFR BLD AUTO: 35.7 % (ref 40–54)
HDLC SERPL-MCNC: 27 MG/DL (ref 40–75)
HDLC SERPL: 21.6 % (ref 20–50)
HGB BLD-MCNC: 12.1 G/DL (ref 14–18)
IMM GRANULOCYTES # BLD AUTO: 0.02 K/UL (ref 0–0.04)
IMM GRANULOCYTES NFR BLD AUTO: 0.2 % (ref 0–0.5)
LACTATE SERPL-SCNC: 1.9 MMOL/L (ref 0.5–2.2)
LDLC SERPL CALC-MCNC: 18.6 MG/DL (ref 63–159)
LYMPHOCYTES # BLD AUTO: 2.7 K/UL (ref 1–4.8)
LYMPHOCYTES NFR BLD: 32.1 % (ref 18–48)
MCH RBC QN AUTO: 29.9 PG (ref 27–31)
MCHC RBC AUTO-ENTMCNC: 33.9 G/DL (ref 32–36)
MCV RBC AUTO: 88 FL (ref 82–98)
MONOCYTES # BLD AUTO: 0.8 K/UL (ref 0.3–1)
MONOCYTES NFR BLD: 9 % (ref 4–15)
NEUTROPHILS # BLD AUTO: 4.3 K/UL (ref 1.8–7.7)
NEUTROPHILS NFR BLD: 51.9 % (ref 38–73)
NONHDLC SERPL-MCNC: 98 MG/DL
NRBC BLD-RTO: 0 /100 WBC
PLATELET # BLD AUTO: 134 K/UL (ref 150–450)
PMV BLD AUTO: 12.6 FL (ref 9.2–12.9)
POCT GLUCOSE: 282 MG/DL (ref 70–110)
POCT GLUCOSE: 329 MG/DL (ref 70–110)
POCT GLUCOSE: 450 MG/DL (ref 70–110)
POCT GLUCOSE: 455 MG/DL (ref 70–110)
POCT GLUCOSE: 466 MG/DL (ref 70–110)
POTASSIUM SERPL-SCNC: 5 MMOL/L (ref 3.5–5.1)
PROT SERPL-MCNC: 7.3 G/DL (ref 6–8.4)
RBC # BLD AUTO: 4.05 M/UL (ref 4.6–6.2)
SARS-COV-2 RDRP RESP QL NAA+PROBE: NEGATIVE
SODIUM SERPL-SCNC: 138 MMOL/L (ref 136–145)
TRIGL SERPL-MCNC: 397 MG/DL (ref 30–150)
WBC # BLD AUTO: 8.32 K/UL (ref 3.9–12.7)

## 2021-10-11 PROCEDURE — C9399 UNCLASSIFIED DRUGS OR BIOLOG: HCPCS | Performed by: STUDENT IN AN ORGANIZED HEALTH CARE EDUCATION/TRAINING PROGRAM

## 2021-10-11 PROCEDURE — 63600175 PHARM REV CODE 636 W HCPCS: Performed by: STUDENT IN AN ORGANIZED HEALTH CARE EDUCATION/TRAINING PROGRAM

## 2021-10-11 PROCEDURE — 97597 DBRDMT OPN WND 1ST 20 CM/<: CPT | Mod: ,,, | Performed by: PODIATRIST

## 2021-10-11 PROCEDURE — 96372 THER/PROPH/DIAG INJ SC/IM: CPT | Mod: 59

## 2021-10-11 PROCEDURE — 93005 ELECTROCARDIOGRAM TRACING: CPT

## 2021-10-11 PROCEDURE — 99224 PR SUBSEQUENT OBSERVATION CARE,LEVEL I: ICD-10-PCS | Mod: 25,,, | Performed by: PODIATRIST

## 2021-10-11 PROCEDURE — 96375 TX/PRO/DX INJ NEW DRUG ADDON: CPT

## 2021-10-11 PROCEDURE — S0030 INJECTION, METRONIDAZOLE: HCPCS | Performed by: STUDENT IN AN ORGANIZED HEALTH CARE EDUCATION/TRAINING PROGRAM

## 2021-10-11 PROCEDURE — 63600175 PHARM REV CODE 636 W HCPCS: Performed by: INTERNAL MEDICINE

## 2021-10-11 PROCEDURE — 99285 EMERGENCY DEPT VISIT HI MDM: CPT | Mod: 25

## 2021-10-11 PROCEDURE — U0002 COVID-19 LAB TEST NON-CDC: HCPCS | Performed by: STUDENT IN AN ORGANIZED HEALTH CARE EDUCATION/TRAINING PROGRAM

## 2021-10-11 PROCEDURE — 25000003 PHARM REV CODE 250: Performed by: PODIATRIST

## 2021-10-11 PROCEDURE — 80197 ASSAY OF TACROLIMUS: CPT | Performed by: STUDENT IN AN ORGANIZED HEALTH CARE EDUCATION/TRAINING PROGRAM

## 2021-10-11 PROCEDURE — 25000003 PHARM REV CODE 250: Performed by: EMERGENCY MEDICINE

## 2021-10-11 PROCEDURE — G0378 HOSPITAL OBSERVATION PER HR: HCPCS

## 2021-10-11 PROCEDURE — 99223 1ST HOSP IP/OBS HIGH 75: CPT | Mod: ,,, | Performed by: NURSE PRACTITIONER

## 2021-10-11 PROCEDURE — 82962 GLUCOSE BLOOD TEST: CPT

## 2021-10-11 PROCEDURE — 85025 COMPLETE CBC W/AUTO DIFF WBC: CPT | Performed by: EMERGENCY MEDICINE

## 2021-10-11 PROCEDURE — 87077 CULTURE AEROBIC IDENTIFY: CPT | Performed by: PODIATRIST

## 2021-10-11 PROCEDURE — 83605 ASSAY OF LACTIC ACID: CPT | Performed by: EMERGENCY MEDICINE

## 2021-10-11 PROCEDURE — 93010 EKG 12-LEAD: ICD-10-PCS | Mod: ,,, | Performed by: INTERNAL MEDICINE

## 2021-10-11 PROCEDURE — 87075 CULTR BACTERIA EXCEPT BLOOD: CPT | Performed by: PODIATRIST

## 2021-10-11 PROCEDURE — 99223 PR INITIAL HOSPITAL CARE,LEVL III: ICD-10-PCS | Mod: ,,, | Performed by: NURSE PRACTITIONER

## 2021-10-11 PROCEDURE — 80053 COMPREHEN METABOLIC PANEL: CPT | Performed by: EMERGENCY MEDICINE

## 2021-10-11 PROCEDURE — 96365 THER/PROPH/DIAG IV INF INIT: CPT

## 2021-10-11 PROCEDURE — 99224 PR SUBSEQUENT OBSERVATION CARE,LEVEL I: CPT | Mod: 25,,, | Performed by: PODIATRIST

## 2021-10-11 PROCEDURE — 97597 WOUND DEBRIDEMENT: ICD-10-PCS | Mod: ,,, | Performed by: PODIATRIST

## 2021-10-11 PROCEDURE — 87070 CULTURE OTHR SPECIMN AEROBIC: CPT | Performed by: PODIATRIST

## 2021-10-11 PROCEDURE — 93010 ELECTROCARDIOGRAM REPORT: CPT | Mod: ,,, | Performed by: INTERNAL MEDICINE

## 2021-10-11 PROCEDURE — 85652 RBC SED RATE AUTOMATED: CPT | Performed by: EMERGENCY MEDICINE

## 2021-10-11 PROCEDURE — 36415 COLL VENOUS BLD VENIPUNCTURE: CPT | Performed by: STUDENT IN AN ORGANIZED HEALTH CARE EDUCATION/TRAINING PROGRAM

## 2021-10-11 PROCEDURE — 83036 HEMOGLOBIN GLYCOSYLATED A1C: CPT | Performed by: STUDENT IN AN ORGANIZED HEALTH CARE EDUCATION/TRAINING PROGRAM

## 2021-10-11 PROCEDURE — 63600175 PHARM REV CODE 636 W HCPCS: Performed by: EMERGENCY MEDICINE

## 2021-10-11 PROCEDURE — 86140 C-REACTIVE PROTEIN: CPT | Performed by: EMERGENCY MEDICINE

## 2021-10-11 PROCEDURE — 80061 LIPID PANEL: CPT | Performed by: STUDENT IN AN ORGANIZED HEALTH CARE EDUCATION/TRAINING PROGRAM

## 2021-10-11 PROCEDURE — 90715 TDAP VACCINE 7 YRS/> IM: CPT | Performed by: EMERGENCY MEDICINE

## 2021-10-11 PROCEDURE — 87186 SC STD MICRODIL/AGAR DIL: CPT | Performed by: PODIATRIST

## 2021-10-11 PROCEDURE — 87040 BLOOD CULTURE FOR BACTERIA: CPT | Mod: 59 | Performed by: EMERGENCY MEDICINE

## 2021-10-11 PROCEDURE — 25000003 PHARM REV CODE 250: Performed by: STUDENT IN AN ORGANIZED HEALTH CARE EDUCATION/TRAINING PROGRAM

## 2021-10-11 PROCEDURE — 90471 IMMUNIZATION ADMIN: CPT | Performed by: EMERGENCY MEDICINE

## 2021-10-11 PROCEDURE — 82306 VITAMIN D 25 HYDROXY: CPT | Performed by: STUDENT IN AN ORGANIZED HEALTH CARE EDUCATION/TRAINING PROGRAM

## 2021-10-11 RX ORDER — MUPIROCIN 20 MG/G
OINTMENT TOPICAL DAILY
Status: DISCONTINUED | OUTPATIENT
Start: 2021-10-12 | End: 2021-10-16 | Stop reason: HOSPADM

## 2021-10-11 RX ORDER — IBUPROFEN 200 MG
16 TABLET ORAL
Status: DISCONTINUED | OUTPATIENT
Start: 2021-10-11 | End: 2021-10-16 | Stop reason: HOSPADM

## 2021-10-11 RX ORDER — NALOXONE HCL 0.4 MG/ML
0.02 VIAL (ML) INJECTION
Status: DISCONTINUED | OUTPATIENT
Start: 2021-10-11 | End: 2021-10-16 | Stop reason: HOSPADM

## 2021-10-11 RX ORDER — GLUCAGON 1 MG
1 KIT INJECTION
Status: DISCONTINUED | OUTPATIENT
Start: 2021-10-11 | End: 2021-10-16 | Stop reason: HOSPADM

## 2021-10-11 RX ORDER — INSULIN ASPART 100 [IU]/ML
10 INJECTION, SOLUTION INTRAVENOUS; SUBCUTANEOUS ONCE
Status: DISCONTINUED | OUTPATIENT
Start: 2021-10-11 | End: 2021-10-11

## 2021-10-11 RX ORDER — ONDANSETRON 2 MG/ML
4 INJECTION INTRAMUSCULAR; INTRAVENOUS
Status: COMPLETED | OUTPATIENT
Start: 2021-10-11 | End: 2021-10-11

## 2021-10-11 RX ORDER — SODIUM CHLORIDE 0.9 % (FLUSH) 0.9 %
10 SYRINGE (ML) INJECTION EVERY 12 HOURS PRN
Status: DISCONTINUED | OUTPATIENT
Start: 2021-10-11 | End: 2021-10-16 | Stop reason: HOSPADM

## 2021-10-11 RX ORDER — TRAZODONE HYDROCHLORIDE 50 MG/1
50 TABLET ORAL NIGHTLY
COMMUNITY
End: 2021-12-06

## 2021-10-11 RX ORDER — INSULIN ASPART 100 [IU]/ML
1-10 INJECTION, SOLUTION INTRAVENOUS; SUBCUTANEOUS
Status: DISCONTINUED | OUTPATIENT
Start: 2021-10-11 | End: 2021-10-16 | Stop reason: HOSPADM

## 2021-10-11 RX ORDER — INSULIN ASPART 100 [IU]/ML
10 INJECTION, SOLUTION INTRAVENOUS; SUBCUTANEOUS ONCE
Status: COMPLETED | OUTPATIENT
Start: 2021-10-11 | End: 2021-10-11

## 2021-10-11 RX ORDER — ENOXAPARIN SODIUM 100 MG/ML
40 INJECTION SUBCUTANEOUS EVERY 24 HOURS
Status: DISCONTINUED | OUTPATIENT
Start: 2021-10-11 | End: 2021-10-16 | Stop reason: HOSPADM

## 2021-10-11 RX ORDER — TACROLIMUS 1 MG/1
1 CAPSULE ORAL EVERY EVENING
Status: DISCONTINUED | OUTPATIENT
Start: 2021-10-11 | End: 2021-10-12

## 2021-10-11 RX ORDER — INSULIN ASPART 100 [IU]/ML
20 INJECTION, SOLUTION INTRAVENOUS; SUBCUTANEOUS
Status: DISCONTINUED | OUTPATIENT
Start: 2021-10-11 | End: 2021-10-15

## 2021-10-11 RX ORDER — TACROLIMUS 1 MG/1
1 CAPSULE ORAL NIGHTLY
Status: ON HOLD | COMMUNITY
End: 2021-10-16 | Stop reason: HOSPADM

## 2021-10-11 RX ORDER — GABAPENTIN 400 MG/1
800 CAPSULE ORAL 3 TIMES DAILY
Status: DISCONTINUED | OUTPATIENT
Start: 2021-10-11 | End: 2021-10-16 | Stop reason: HOSPADM

## 2021-10-11 RX ORDER — HYDROCODONE BITARTRATE AND ACETAMINOPHEN 5; 325 MG/1; MG/1
1 TABLET ORAL EVERY 6 HOURS PRN
Status: DISCONTINUED | OUTPATIENT
Start: 2021-10-11 | End: 2021-10-16 | Stop reason: HOSPADM

## 2021-10-11 RX ORDER — MORPHINE SULFATE 4 MG/ML
4 INJECTION, SOLUTION INTRAMUSCULAR; INTRAVENOUS
Status: COMPLETED | OUTPATIENT
Start: 2021-10-11 | End: 2021-10-11

## 2021-10-11 RX ORDER — METRONIDAZOLE 500 MG/100ML
500 INJECTION, SOLUTION INTRAVENOUS EVERY 6 HOURS
Status: DISCONTINUED | OUTPATIENT
Start: 2021-10-11 | End: 2021-10-11

## 2021-10-11 RX ORDER — IBUPROFEN 200 MG
24 TABLET ORAL
Status: DISCONTINUED | OUTPATIENT
Start: 2021-10-11 | End: 2021-10-16 | Stop reason: HOSPADM

## 2021-10-11 RX ORDER — CEFEPIME HYDROCHLORIDE 1 G/50ML
2 INJECTION, SOLUTION INTRAVENOUS
Status: DISCONTINUED | OUTPATIENT
Start: 2021-10-11 | End: 2021-10-11

## 2021-10-11 RX ADMIN — INSULIN HUMAN 8 UNITS: 100 INJECTION, SOLUTION PARENTERAL at 10:10

## 2021-10-11 RX ADMIN — VANCOMYCIN HYDROCHLORIDE 2000 MG: 10 INJECTION, POWDER, LYOPHILIZED, FOR SOLUTION INTRAVENOUS at 01:10

## 2021-10-11 RX ADMIN — INSULIN ASPART 10 UNITS: 100 INJECTION, SOLUTION INTRAVENOUS; SUBCUTANEOUS at 03:10

## 2021-10-11 RX ADMIN — INSULIN ASPART 20 UNITS: 100 INJECTION, SOLUTION INTRAVENOUS; SUBCUTANEOUS at 04:10

## 2021-10-11 RX ADMIN — TETANUS TOXOID, REDUCED DIPHTHERIA TOXOID AND ACELLULAR PERTUSSIS VACCINE, ADSORBED 0.5 ML: 5; 2.5; 8; 8; 2.5 SUSPENSION INTRAMUSCULAR at 10:10

## 2021-10-11 RX ADMIN — INSULIN ASPART 20 UNITS: 100 INJECTION, SOLUTION INTRAVENOUS; SUBCUTANEOUS at 03:10

## 2021-10-11 RX ADMIN — GABAPENTIN 800 MG: 400 CAPSULE ORAL at 08:10

## 2021-10-11 RX ADMIN — HYDROCODONE BITARTRATE AND ACETAMINOPHEN 1 TABLET: 5; 325 TABLET ORAL at 07:10

## 2021-10-11 RX ADMIN — METRONIDAZOLE 500 MG: 500 INJECTION, SOLUTION INTRAVENOUS at 05:10

## 2021-10-11 RX ADMIN — ONDANSETRON 4 MG: 2 INJECTION INTRAMUSCULAR; INTRAVENOUS at 10:10

## 2021-10-11 RX ADMIN — INSULIN DETEMIR 45 UNITS: 100 INJECTION, SOLUTION SUBCUTANEOUS at 08:10

## 2021-10-11 RX ADMIN — ENOXAPARIN SODIUM 40 MG: 40 INJECTION, SOLUTION INTRAVENOUS; SUBCUTANEOUS at 04:10

## 2021-10-11 RX ADMIN — PIPERACILLIN AND TAZOBACTAM 4.5 G: 4; .5 INJECTION, POWDER, FOR SOLUTION INTRAVENOUS at 12:10

## 2021-10-11 RX ADMIN — TACROLIMUS 1 MG: 1 CAPSULE ORAL at 08:10

## 2021-10-11 RX ADMIN — MORPHINE SULFATE 4 MG: 4 INJECTION INTRAVENOUS at 10:10

## 2021-10-11 RX ADMIN — INSULIN ASPART 3 UNITS: 100 INJECTION, SOLUTION INTRAVENOUS; SUBCUTANEOUS at 08:10

## 2021-10-12 LAB
25(OH)D3+25(OH)D2 SERPL-MCNC: 30 NG/ML (ref 30–96)
ALBUMIN SERPL BCP-MCNC: 3.2 G/DL (ref 3.5–5.2)
ALP SERPL-CCNC: 94 U/L (ref 55–135)
ALT SERPL W/O P-5'-P-CCNC: 21 U/L (ref 10–44)
ANION GAP SERPL CALC-SCNC: 10 MMOL/L (ref 8–16)
AST SERPL-CCNC: 11 U/L (ref 10–40)
BASOPHILS # BLD AUTO: 0.03 K/UL (ref 0–0.2)
BASOPHILS NFR BLD: 0.4 % (ref 0–1.9)
BILIRUB SERPL-MCNC: 0.3 MG/DL (ref 0.1–1)
BUN SERPL-MCNC: 17 MG/DL (ref 8–23)
CALCIUM SERPL-MCNC: 8.2 MG/DL (ref 8.7–10.5)
CHLORIDE SERPL-SCNC: 105 MMOL/L (ref 95–110)
CO2 SERPL-SCNC: 22 MMOL/L (ref 23–29)
CREAT SERPL-MCNC: 1.4 MG/DL (ref 0.5–1.4)
DIFFERENTIAL METHOD: ABNORMAL
EOSINOPHIL # BLD AUTO: 0.5 K/UL (ref 0–0.5)
EOSINOPHIL NFR BLD: 6.7 % (ref 0–8)
ERYTHROCYTE [DISTWIDTH] IN BLOOD BY AUTOMATED COUNT: 13.3 % (ref 11.5–14.5)
EST. GFR  (AFRICAN AMERICAN): >60 ML/MIN/1.73 M^2
EST. GFR  (NON AFRICAN AMERICAN): 53 ML/MIN/1.73 M^2
GLUCOSE SERPL-MCNC: 496 MG/DL (ref 70–110)
HCT VFR BLD AUTO: 35.2 % (ref 40–54)
HGB BLD-MCNC: 11 G/DL (ref 14–18)
IMM GRANULOCYTES # BLD AUTO: 0.02 K/UL (ref 0–0.04)
IMM GRANULOCYTES NFR BLD AUTO: 0.3 % (ref 0–0.5)
LYMPHOCYTES # BLD AUTO: 2.8 K/UL (ref 1–4.8)
LYMPHOCYTES NFR BLD: 39.4 % (ref 18–48)
MAGNESIUM SERPL-MCNC: 1.6 MG/DL (ref 1.6–2.6)
MCH RBC QN AUTO: 28.2 PG (ref 27–31)
MCHC RBC AUTO-ENTMCNC: 31.3 G/DL (ref 32–36)
MCV RBC AUTO: 90 FL (ref 82–98)
MONOCYTES # BLD AUTO: 0.6 K/UL (ref 0.3–1)
MONOCYTES NFR BLD: 8.3 % (ref 4–15)
NEUTROPHILS # BLD AUTO: 3.2 K/UL (ref 1.8–7.7)
NEUTROPHILS NFR BLD: 44.9 % (ref 38–73)
NRBC BLD-RTO: 0 /100 WBC
PHOSPHATE SERPL-MCNC: 1.8 MG/DL (ref 2.7–4.5)
PLATELET # BLD AUTO: 109 K/UL (ref 150–450)
PMV BLD AUTO: 12.5 FL (ref 9.2–12.9)
POCT GLUCOSE: 298 MG/DL (ref 70–110)
POCT GLUCOSE: 312 MG/DL (ref 70–110)
POCT GLUCOSE: 454 MG/DL (ref 70–110)
POCT GLUCOSE: 493 MG/DL (ref 70–110)
POCT GLUCOSE: >500 MG/DL (ref 70–110)
POTASSIUM SERPL-SCNC: 4.9 MMOL/L (ref 3.5–5.1)
PROT SERPL-MCNC: 6.3 G/DL (ref 6–8.4)
RBC # BLD AUTO: 3.9 M/UL (ref 4.6–6.2)
SODIUM SERPL-SCNC: 137 MMOL/L (ref 136–145)
TACROLIMUS BLD-MCNC: 4 NG/ML (ref 5–15)
TACROLIMUS BLD-MCNC: 6.5 NG/ML (ref 5–15)
TACROLIMUS, NORMALIZED: 3.6 NG/ML (ref 5–15)
TACROLIMUS, NORMALIZED: 6 NG/ML (ref 5–15)
WBC # BLD AUTO: 7.19 K/UL (ref 3.9–12.7)

## 2021-10-12 PROCEDURE — 80053 COMPREHEN METABOLIC PANEL: CPT | Performed by: STUDENT IN AN ORGANIZED HEALTH CARE EDUCATION/TRAINING PROGRAM

## 2021-10-12 PROCEDURE — 96376 TX/PRO/DX INJ SAME DRUG ADON: CPT

## 2021-10-12 PROCEDURE — 63600175 PHARM REV CODE 636 W HCPCS: Performed by: STUDENT IN AN ORGANIZED HEALTH CARE EDUCATION/TRAINING PROGRAM

## 2021-10-12 PROCEDURE — 36415 COLL VENOUS BLD VENIPUNCTURE: CPT | Performed by: INTERNAL MEDICINE

## 2021-10-12 PROCEDURE — 99204 OFFICE O/P NEW MOD 45 MIN: CPT | Mod: GC,,, | Performed by: INTERNAL MEDICINE

## 2021-10-12 PROCEDURE — 83735 ASSAY OF MAGNESIUM: CPT | Performed by: STUDENT IN AN ORGANIZED HEALTH CARE EDUCATION/TRAINING PROGRAM

## 2021-10-12 PROCEDURE — 63600175 PHARM REV CODE 636 W HCPCS: Performed by: INTERNAL MEDICINE

## 2021-10-12 PROCEDURE — 99223 1ST HOSP IP/OBS HIGH 75: CPT | Mod: GC,,, | Performed by: INTERNAL MEDICINE

## 2021-10-12 PROCEDURE — 25000003 PHARM REV CODE 250: Performed by: NURSE PRACTITIONER

## 2021-10-12 PROCEDURE — 99223 PR INITIAL HOSPITAL CARE,LEVL III: ICD-10-PCS | Mod: GC,,, | Performed by: INTERNAL MEDICINE

## 2021-10-12 PROCEDURE — 25000003 PHARM REV CODE 250: Performed by: STUDENT IN AN ORGANIZED HEALTH CARE EDUCATION/TRAINING PROGRAM

## 2021-10-12 PROCEDURE — G0378 HOSPITAL OBSERVATION PER HR: HCPCS

## 2021-10-12 PROCEDURE — 84100 ASSAY OF PHOSPHORUS: CPT | Performed by: STUDENT IN AN ORGANIZED HEALTH CARE EDUCATION/TRAINING PROGRAM

## 2021-10-12 PROCEDURE — 80197 ASSAY OF TACROLIMUS: CPT | Performed by: INTERNAL MEDICINE

## 2021-10-12 PROCEDURE — 25000003 PHARM REV CODE 250: Performed by: PODIATRIST

## 2021-10-12 PROCEDURE — 85025 COMPLETE CBC W/AUTO DIFF WBC: CPT | Performed by: STUDENT IN AN ORGANIZED HEALTH CARE EDUCATION/TRAINING PROGRAM

## 2021-10-12 PROCEDURE — 99204 PR OFFICE/OUTPT VISIT, NEW, LEVL IV, 45-59 MIN: ICD-10-PCS | Mod: GC,,, | Performed by: INTERNAL MEDICINE

## 2021-10-12 PROCEDURE — 99233 PR SUBSEQUENT HOSPITAL CARE,LEVL III: ICD-10-PCS | Mod: ,,, | Performed by: NURSE PRACTITIONER

## 2021-10-12 PROCEDURE — 99233 SBSQ HOSP IP/OBS HIGH 50: CPT | Mod: ,,, | Performed by: NURSE PRACTITIONER

## 2021-10-12 PROCEDURE — 96375 TX/PRO/DX INJ NEW DRUG ADDON: CPT

## 2021-10-12 PROCEDURE — S0030 INJECTION, METRONIDAZOLE: HCPCS | Performed by: STUDENT IN AN ORGANIZED HEALTH CARE EDUCATION/TRAINING PROGRAM

## 2021-10-12 PROCEDURE — 25000003 PHARM REV CODE 250: Performed by: INTERNAL MEDICINE

## 2021-10-12 PROCEDURE — 96372 THER/PROPH/DIAG INJ SC/IM: CPT | Mod: 59

## 2021-10-12 RX ORDER — CEFEPIME HYDROCHLORIDE 1 G/50ML
1 INJECTION, SOLUTION INTRAVENOUS
Status: DISCONTINUED | OUTPATIENT
Start: 2021-10-12 | End: 2021-10-14

## 2021-10-12 RX ORDER — LISINOPRIL 20 MG/1
40 TABLET ORAL DAILY
Status: DISCONTINUED | OUTPATIENT
Start: 2021-10-12 | End: 2021-10-16 | Stop reason: HOSPADM

## 2021-10-12 RX ORDER — CEFEPIME HYDROCHLORIDE 1 G/50ML
1 INJECTION, SOLUTION INTRAVENOUS
Status: DISCONTINUED | OUTPATIENT
Start: 2021-10-12 | End: 2021-10-12

## 2021-10-12 RX ORDER — METOPROLOL SUCCINATE 50 MG/1
100 TABLET, EXTENDED RELEASE ORAL DAILY
Status: DISCONTINUED | OUTPATIENT
Start: 2021-10-12 | End: 2021-10-14

## 2021-10-12 RX ORDER — TACROLIMUS 1 MG/1
1 CAPSULE ORAL 2 TIMES DAILY
Status: DISCONTINUED | OUTPATIENT
Start: 2021-10-12 | End: 2021-10-16 | Stop reason: HOSPADM

## 2021-10-12 RX ORDER — TACROLIMUS 1 MG/1
1 CAPSULE ORAL EVERY MORNING
Status: DISCONTINUED | OUTPATIENT
Start: 2021-10-12 | End: 2021-10-12

## 2021-10-12 RX ORDER — METRONIDAZOLE 500 MG/100ML
500 INJECTION, SOLUTION INTRAVENOUS
Status: DISCONTINUED | OUTPATIENT
Start: 2021-10-12 | End: 2021-10-14

## 2021-10-12 RX ORDER — ASPIRIN 81 MG/1
81 TABLET ORAL DAILY
Status: DISCONTINUED | OUTPATIENT
Start: 2021-10-12 | End: 2021-10-16 | Stop reason: HOSPADM

## 2021-10-12 RX ORDER — ATORVASTATIN CALCIUM 40 MG/1
80 TABLET, FILM COATED ORAL NIGHTLY
Status: DISCONTINUED | OUTPATIENT
Start: 2021-10-12 | End: 2021-10-16 | Stop reason: HOSPADM

## 2021-10-12 RX ADMIN — ATORVASTATIN CALCIUM 80 MG: 40 TABLET, FILM COATED ORAL at 09:10

## 2021-10-12 RX ADMIN — METRONIDAZOLE 500 MG: 500 INJECTION, SOLUTION INTRAVENOUS at 09:10

## 2021-10-12 RX ADMIN — INSULIN ASPART 10 UNITS: 100 INJECTION, SOLUTION INTRAVENOUS; SUBCUTANEOUS at 07:10

## 2021-10-12 RX ADMIN — HYDROCODONE BITARTRATE AND ACETAMINOPHEN 1 TABLET: 5; 325 TABLET ORAL at 07:10

## 2021-10-12 RX ADMIN — VANCOMYCIN HYDROCHLORIDE 1500 MG: 1.5 INJECTION, POWDER, LYOPHILIZED, FOR SOLUTION INTRAVENOUS at 03:10

## 2021-10-12 RX ADMIN — TACROLIMUS 1 MG: 1 CAPSULE ORAL at 02:10

## 2021-10-12 RX ADMIN — INSULIN ASPART 20 UNITS: 100 INJECTION, SOLUTION INTRAVENOUS; SUBCUTANEOUS at 07:10

## 2021-10-12 RX ADMIN — HYDROCODONE BITARTRATE AND ACETAMINOPHEN 1 TABLET: 5; 325 TABLET ORAL at 04:10

## 2021-10-12 RX ADMIN — INSULIN ASPART 8 UNITS: 100 INJECTION, SOLUTION INTRAVENOUS; SUBCUTANEOUS at 04:10

## 2021-10-12 RX ADMIN — INSULIN ASPART 5 UNITS: 100 INJECTION, SOLUTION INTRAVENOUS; SUBCUTANEOUS at 09:10

## 2021-10-12 RX ADMIN — INSULIN ASPART 6 UNITS: 100 INJECTION, SOLUTION INTRAVENOUS; SUBCUTANEOUS at 11:10

## 2021-10-12 RX ADMIN — GABAPENTIN 800 MG: 400 CAPSULE ORAL at 08:10

## 2021-10-12 RX ADMIN — CEFEPIME 1 G: 1 INJECTION, POWDER, FOR SOLUTION INTRAMUSCULAR; INTRAVENOUS at 10:10

## 2021-10-12 RX ADMIN — ASPIRIN 81 MG: 81 TABLET, COATED ORAL at 01:10

## 2021-10-12 RX ADMIN — GABAPENTIN 800 MG: 400 CAPSULE ORAL at 03:10

## 2021-10-12 RX ADMIN — ENOXAPARIN SODIUM 40 MG: 40 INJECTION, SOLUTION INTRAVENOUS; SUBCUTANEOUS at 04:10

## 2021-10-12 RX ADMIN — MUPIROCIN: 20 OINTMENT TOPICAL at 09:10

## 2021-10-12 RX ADMIN — LISINOPRIL 40 MG: 20 TABLET ORAL at 11:10

## 2021-10-12 RX ADMIN — GABAPENTIN 800 MG: 400 CAPSULE ORAL at 09:10

## 2021-10-12 RX ADMIN — INSULIN ASPART 20 UNITS: 100 INJECTION, SOLUTION INTRAVENOUS; SUBCUTANEOUS at 04:10

## 2021-10-12 RX ADMIN — METRONIDAZOLE 500 MG: 500 INJECTION, SOLUTION INTRAVENOUS at 02:10

## 2021-10-12 RX ADMIN — METOPROLOL SUCCINATE 100 MG: 50 TABLET, EXTENDED RELEASE ORAL at 06:10

## 2021-10-12 RX ADMIN — INSULIN ASPART 20 UNITS: 100 INJECTION, SOLUTION INTRAVENOUS; SUBCUTANEOUS at 11:10

## 2021-10-12 RX ADMIN — CEFEPIME 1 G: 1 INJECTION, POWDER, FOR SOLUTION INTRAMUSCULAR; INTRAVENOUS at 02:10

## 2021-10-12 RX ADMIN — HYDROCODONE BITARTRATE AND ACETAMINOPHEN 1 TABLET: 5; 325 TABLET ORAL at 02:10

## 2021-10-12 RX ADMIN — TACROLIMUS 1 MG: 1 CAPSULE ORAL at 05:10

## 2021-10-13 LAB
ALBUMIN SERPL BCP-MCNC: 3.3 G/DL (ref 3.5–5.2)
ALP SERPL-CCNC: 99 U/L (ref 55–135)
ALT SERPL W/O P-5'-P-CCNC: 21 U/L (ref 10–44)
ANION GAP SERPL CALC-SCNC: 8 MMOL/L (ref 8–16)
AST SERPL-CCNC: 13 U/L (ref 10–40)
BASOPHILS # BLD AUTO: 0.02 K/UL (ref 0–0.2)
BASOPHILS NFR BLD: 0.3 % (ref 0–1.9)
BILIRUB SERPL-MCNC: 0.3 MG/DL (ref 0.1–1)
BUN SERPL-MCNC: 16 MG/DL (ref 8–23)
CALCIUM SERPL-MCNC: 8.6 MG/DL (ref 8.7–10.5)
CHLORIDE SERPL-SCNC: 104 MMOL/L (ref 95–110)
CO2 SERPL-SCNC: 24 MMOL/L (ref 23–29)
CREAT SERPL-MCNC: 1.3 MG/DL (ref 0.5–1.4)
DIFFERENTIAL METHOD: ABNORMAL
EOSINOPHIL # BLD AUTO: 0.5 K/UL (ref 0–0.5)
EOSINOPHIL NFR BLD: 7 % (ref 0–8)
ERYTHROCYTE [DISTWIDTH] IN BLOOD BY AUTOMATED COUNT: 13 % (ref 11.5–14.5)
EST. GFR  (AFRICAN AMERICAN): >60 ML/MIN/1.73 M^2
EST. GFR  (NON AFRICAN AMERICAN): 58 ML/MIN/1.73 M^2
GLUCOSE SERPL-MCNC: 349 MG/DL (ref 70–110)
HCT VFR BLD AUTO: 35.7 % (ref 40–54)
HGB BLD-MCNC: 11.9 G/DL (ref 14–18)
IMM GRANULOCYTES # BLD AUTO: 0.02 K/UL (ref 0–0.04)
IMM GRANULOCYTES NFR BLD AUTO: 0.3 % (ref 0–0.5)
LYMPHOCYTES # BLD AUTO: 2.4 K/UL (ref 1–4.8)
LYMPHOCYTES NFR BLD: 34.4 % (ref 18–48)
MAGNESIUM SERPL-MCNC: 1.5 MG/DL (ref 1.6–2.6)
MCH RBC QN AUTO: 29.3 PG (ref 27–31)
MCHC RBC AUTO-ENTMCNC: 33.3 G/DL (ref 32–36)
MCV RBC AUTO: 88 FL (ref 82–98)
MONOCYTES # BLD AUTO: 0.5 K/UL (ref 0.3–1)
MONOCYTES NFR BLD: 7.2 % (ref 4–15)
NEUTROPHILS # BLD AUTO: 3.6 K/UL (ref 1.8–7.7)
NEUTROPHILS NFR BLD: 50.8 % (ref 38–73)
NRBC BLD-RTO: 0 /100 WBC
PHOSPHATE SERPL-MCNC: 2 MG/DL (ref 2.7–4.5)
PLATELET # BLD AUTO: 118 K/UL (ref 150–450)
PLATELET BLD QL SMEAR: ABNORMAL
PMV BLD AUTO: 13.1 FL (ref 9.2–12.9)
POCT GLUCOSE: 231 MG/DL (ref 70–110)
POCT GLUCOSE: 242 MG/DL (ref 70–110)
POCT GLUCOSE: 317 MG/DL (ref 70–110)
POCT GLUCOSE: 386 MG/DL (ref 70–110)
POTASSIUM SERPL-SCNC: 4.7 MMOL/L (ref 3.5–5.1)
PROT SERPL-MCNC: 6.9 G/DL (ref 6–8.4)
RBC # BLD AUTO: 4.06 M/UL (ref 4.6–6.2)
SODIUM SERPL-SCNC: 136 MMOL/L (ref 136–145)
TACROLIMUS BLD-MCNC: 4.1 NG/ML (ref 5–15)
TACROLIMUS, NORMALIZED: 3.7 NG/ML (ref 5–15)
VANCOMYCIN TROUGH SERPL-MCNC: 9.1 UG/ML (ref 10–22)
WBC # BLD AUTO: 7.1 K/UL (ref 3.9–12.7)

## 2021-10-13 PROCEDURE — 25000003 PHARM REV CODE 250: Performed by: NURSE PRACTITIONER

## 2021-10-13 PROCEDURE — 63600175 PHARM REV CODE 636 W HCPCS: Performed by: INTERNAL MEDICINE

## 2021-10-13 PROCEDURE — 11000001 HC ACUTE MED/SURG PRIVATE ROOM

## 2021-10-13 PROCEDURE — 99233 SBSQ HOSP IP/OBS HIGH 50: CPT | Mod: ,,, | Performed by: STUDENT IN AN ORGANIZED HEALTH CARE EDUCATION/TRAINING PROGRAM

## 2021-10-13 PROCEDURE — 63600175 PHARM REV CODE 636 W HCPCS: Performed by: STUDENT IN AN ORGANIZED HEALTH CARE EDUCATION/TRAINING PROGRAM

## 2021-10-13 PROCEDURE — 96376 TX/PRO/DX INJ SAME DRUG ADON: CPT

## 2021-10-13 PROCEDURE — 36415 COLL VENOUS BLD VENIPUNCTURE: CPT | Performed by: INTERNAL MEDICINE

## 2021-10-13 PROCEDURE — 99233 PR SUBSEQUENT HOSPITAL CARE,LEVL III: ICD-10-PCS | Mod: ,,, | Performed by: STUDENT IN AN ORGANIZED HEALTH CARE EDUCATION/TRAINING PROGRAM

## 2021-10-13 PROCEDURE — 99232 PR SUBSEQUENT HOSPITAL CARE,LEVL II: ICD-10-PCS | Mod: ,,, | Performed by: INTERNAL MEDICINE

## 2021-10-13 PROCEDURE — 80053 COMPREHEN METABOLIC PANEL: CPT | Performed by: STUDENT IN AN ORGANIZED HEALTH CARE EDUCATION/TRAINING PROGRAM

## 2021-10-13 PROCEDURE — 80197 ASSAY OF TACROLIMUS: CPT | Performed by: STUDENT IN AN ORGANIZED HEALTH CARE EDUCATION/TRAINING PROGRAM

## 2021-10-13 PROCEDURE — 80202 ASSAY OF VANCOMYCIN: CPT | Performed by: INTERNAL MEDICINE

## 2021-10-13 PROCEDURE — 25000003 PHARM REV CODE 250: Performed by: INTERNAL MEDICINE

## 2021-10-13 PROCEDURE — A4216 STERILE WATER/SALINE, 10 ML: HCPCS | Performed by: STUDENT IN AN ORGANIZED HEALTH CARE EDUCATION/TRAINING PROGRAM

## 2021-10-13 PROCEDURE — 25000003 PHARM REV CODE 250: Performed by: STUDENT IN AN ORGANIZED HEALTH CARE EDUCATION/TRAINING PROGRAM

## 2021-10-13 PROCEDURE — 84100 ASSAY OF PHOSPHORUS: CPT | Performed by: STUDENT IN AN ORGANIZED HEALTH CARE EDUCATION/TRAINING PROGRAM

## 2021-10-13 PROCEDURE — 25000003 PHARM REV CODE 250: Performed by: PODIATRIST

## 2021-10-13 PROCEDURE — 85025 COMPLETE CBC W/AUTO DIFF WBC: CPT | Performed by: STUDENT IN AN ORGANIZED HEALTH CARE EDUCATION/TRAINING PROGRAM

## 2021-10-13 PROCEDURE — 96372 THER/PROPH/DIAG INJ SC/IM: CPT | Mod: 59

## 2021-10-13 PROCEDURE — 36415 COLL VENOUS BLD VENIPUNCTURE: CPT | Performed by: STUDENT IN AN ORGANIZED HEALTH CARE EDUCATION/TRAINING PROGRAM

## 2021-10-13 PROCEDURE — 83735 ASSAY OF MAGNESIUM: CPT | Performed by: STUDENT IN AN ORGANIZED HEALTH CARE EDUCATION/TRAINING PROGRAM

## 2021-10-13 PROCEDURE — S0030 INJECTION, METRONIDAZOLE: HCPCS | Performed by: STUDENT IN AN ORGANIZED HEALTH CARE EDUCATION/TRAINING PROGRAM

## 2021-10-13 PROCEDURE — 99232 SBSQ HOSP IP/OBS MODERATE 35: CPT | Mod: ,,, | Performed by: INTERNAL MEDICINE

## 2021-10-13 RX ADMIN — LISINOPRIL 40 MG: 20 TABLET ORAL at 08:10

## 2021-10-13 RX ADMIN — INSULIN ASPART 20 UNITS: 100 INJECTION, SOLUTION INTRAVENOUS; SUBCUTANEOUS at 08:10

## 2021-10-13 RX ADMIN — MUPIROCIN: 20 OINTMENT TOPICAL at 08:10

## 2021-10-13 RX ADMIN — ATORVASTATIN CALCIUM 80 MG: 40 TABLET, FILM COATED ORAL at 10:10

## 2021-10-13 RX ADMIN — Medication 10 ML: at 10:10

## 2021-10-13 RX ADMIN — CEFEPIME 1 G: 1 INJECTION, POWDER, FOR SOLUTION INTRAMUSCULAR; INTRAVENOUS at 04:10

## 2021-10-13 RX ADMIN — HYDROCODONE BITARTRATE AND ACETAMINOPHEN 1 TABLET: 5; 325 TABLET ORAL at 09:10

## 2021-10-13 RX ADMIN — INSULIN ASPART 20 UNITS: 100 INJECTION, SOLUTION INTRAVENOUS; SUBCUTANEOUS at 12:10

## 2021-10-13 RX ADMIN — METRONIDAZOLE 500 MG: 500 INJECTION, SOLUTION INTRAVENOUS at 03:10

## 2021-10-13 RX ADMIN — HYDROCODONE BITARTRATE AND ACETAMINOPHEN 1 TABLET: 5; 325 TABLET ORAL at 03:10

## 2021-10-13 RX ADMIN — INSULIN ASPART 8 UNITS: 100 INJECTION, SOLUTION INTRAVENOUS; SUBCUTANEOUS at 05:10

## 2021-10-13 RX ADMIN — CEFEPIME 1 G: 1 INJECTION, POWDER, FOR SOLUTION INTRAMUSCULAR; INTRAVENOUS at 10:10

## 2021-10-13 RX ADMIN — TACROLIMUS 1 MG: 1 CAPSULE ORAL at 08:10

## 2021-10-13 RX ADMIN — GABAPENTIN 800 MG: 400 CAPSULE ORAL at 03:10

## 2021-10-13 RX ADMIN — INSULIN ASPART 2 UNITS: 100 INJECTION, SOLUTION INTRAVENOUS; SUBCUTANEOUS at 10:10

## 2021-10-13 RX ADMIN — HYDROCODONE BITARTRATE AND ACETAMINOPHEN 1 TABLET: 5; 325 TABLET ORAL at 01:10

## 2021-10-13 RX ADMIN — HYDROCODONE BITARTRATE AND ACETAMINOPHEN 1 TABLET: 5; 325 TABLET ORAL at 10:10

## 2021-10-13 RX ADMIN — CEFEPIME 1 G: 1 INJECTION, POWDER, FOR SOLUTION INTRAMUSCULAR; INTRAVENOUS at 05:10

## 2021-10-13 RX ADMIN — TACROLIMUS 1 MG: 1 CAPSULE ORAL at 05:10

## 2021-10-13 RX ADMIN — METRONIDAZOLE 500 MG: 500 INJECTION, SOLUTION INTRAVENOUS at 10:10

## 2021-10-13 RX ADMIN — ASPIRIN 81 MG: 81 TABLET, COATED ORAL at 08:10

## 2021-10-13 RX ADMIN — GABAPENTIN 800 MG: 400 CAPSULE ORAL at 10:10

## 2021-10-13 RX ADMIN — VANCOMYCIN HYDROCHLORIDE 1500 MG: 1.5 INJECTION, POWDER, LYOPHILIZED, FOR SOLUTION INTRAVENOUS at 01:10

## 2021-10-13 RX ADMIN — INSULIN ASPART 20 UNITS: 100 INJECTION, SOLUTION INTRAVENOUS; SUBCUTANEOUS at 05:10

## 2021-10-13 RX ADMIN — ENOXAPARIN SODIUM 40 MG: 40 INJECTION, SOLUTION INTRAVENOUS; SUBCUTANEOUS at 04:10

## 2021-10-13 RX ADMIN — INSULIN ASPART 4 UNITS: 100 INJECTION, SOLUTION INTRAVENOUS; SUBCUTANEOUS at 12:10

## 2021-10-13 RX ADMIN — METRONIDAZOLE 500 MG: 500 INJECTION, SOLUTION INTRAVENOUS at 06:10

## 2021-10-13 RX ADMIN — METOPROLOL SUCCINATE 100 MG: 50 TABLET, EXTENDED RELEASE ORAL at 08:10

## 2021-10-13 RX ADMIN — GABAPENTIN 800 MG: 400 CAPSULE ORAL at 08:10

## 2021-10-14 ENCOUNTER — PATIENT OUTREACH (OUTPATIENT)
Dept: ADMINISTRATIVE | Facility: OTHER | Age: 66
End: 2021-10-14

## 2021-10-14 LAB
ALBUMIN SERPL BCP-MCNC: 3.3 G/DL (ref 3.5–5.2)
ALP SERPL-CCNC: 88 U/L (ref 55–135)
ALT SERPL W/O P-5'-P-CCNC: 21 U/L (ref 10–44)
ANION GAP SERPL CALC-SCNC: 9 MMOL/L (ref 8–16)
AST SERPL-CCNC: 18 U/L (ref 10–40)
BASOPHILS # BLD AUTO: 0.02 K/UL (ref 0–0.2)
BASOPHILS NFR BLD: 0.2 % (ref 0–1.9)
BILIRUB SERPL-MCNC: 0.2 MG/DL (ref 0.1–1)
BUN SERPL-MCNC: 16 MG/DL (ref 8–23)
CALCIUM SERPL-MCNC: 8.8 MG/DL (ref 8.7–10.5)
CHLORIDE SERPL-SCNC: 106 MMOL/L (ref 95–110)
CO2 SERPL-SCNC: 22 MMOL/L (ref 23–29)
CREAT SERPL-MCNC: 1.2 MG/DL (ref 0.5–1.4)
DIFFERENTIAL METHOD: ABNORMAL
EOSINOPHIL # BLD AUTO: 0.6 K/UL (ref 0–0.5)
EOSINOPHIL NFR BLD: 6.4 % (ref 0–8)
ERYTHROCYTE [DISTWIDTH] IN BLOOD BY AUTOMATED COUNT: 13.1 % (ref 11.5–14.5)
EST. GFR  (AFRICAN AMERICAN): >60 ML/MIN/1.73 M^2
EST. GFR  (NON AFRICAN AMERICAN): >60 ML/MIN/1.73 M^2
GLUCOSE SERPL-MCNC: 294 MG/DL (ref 70–110)
HCT VFR BLD AUTO: 36.1 % (ref 40–54)
HGB BLD-MCNC: 11.6 G/DL (ref 14–18)
IMM GRANULOCYTES # BLD AUTO: 0.02 K/UL (ref 0–0.04)
IMM GRANULOCYTES NFR BLD AUTO: 0.2 % (ref 0–0.5)
LYMPHOCYTES # BLD AUTO: 2.6 K/UL (ref 1–4.8)
LYMPHOCYTES NFR BLD: 30.2 % (ref 18–48)
MAGNESIUM SERPL-MCNC: 1.4 MG/DL (ref 1.6–2.6)
MCH RBC QN AUTO: 28.4 PG (ref 27–31)
MCHC RBC AUTO-ENTMCNC: 32.1 G/DL (ref 32–36)
MCV RBC AUTO: 89 FL (ref 82–98)
MONOCYTES # BLD AUTO: 0.7 K/UL (ref 0.3–1)
MONOCYTES NFR BLD: 7.9 % (ref 4–15)
NEUTROPHILS # BLD AUTO: 4.7 K/UL (ref 1.8–7.7)
NEUTROPHILS NFR BLD: 55.1 % (ref 38–73)
NRBC BLD-RTO: 0 /100 WBC
PHOSPHATE SERPL-MCNC: 2.6 MG/DL (ref 2.7–4.5)
PLATELET # BLD AUTO: 111 K/UL (ref 150–450)
PMV BLD AUTO: 13 FL (ref 9.2–12.9)
POCT GLUCOSE: 271 MG/DL (ref 70–110)
POCT GLUCOSE: 287 MG/DL (ref 70–110)
POCT GLUCOSE: 295 MG/DL (ref 70–110)
POTASSIUM SERPL-SCNC: 4.7 MMOL/L (ref 3.5–5.1)
PROT SERPL-MCNC: 6.3 G/DL (ref 6–8.4)
RBC # BLD AUTO: 4.08 M/UL (ref 4.6–6.2)
SODIUM SERPL-SCNC: 137 MMOL/L (ref 136–145)
TACROLIMUS BLD-MCNC: 3.7 NG/ML (ref 5–15)
TACROLIMUS, NORMALIZED: 3.3 NG/ML (ref 5–15)
VANCOMYCIN TROUGH SERPL-MCNC: 9 UG/ML (ref 10–22)
WBC # BLD AUTO: 8.53 K/UL (ref 3.9–12.7)

## 2021-10-14 PROCEDURE — 25000003 PHARM REV CODE 250: Performed by: STUDENT IN AN ORGANIZED HEALTH CARE EDUCATION/TRAINING PROGRAM

## 2021-10-14 PROCEDURE — 99232 PR SUBSEQUENT HOSPITAL CARE,LEVL II: ICD-10-PCS | Mod: GC,,, | Performed by: INTERNAL MEDICINE

## 2021-10-14 PROCEDURE — 80202 ASSAY OF VANCOMYCIN: CPT | Performed by: INTERNAL MEDICINE

## 2021-10-14 PROCEDURE — S0030 INJECTION, METRONIDAZOLE: HCPCS | Performed by: STUDENT IN AN ORGANIZED HEALTH CARE EDUCATION/TRAINING PROGRAM

## 2021-10-14 PROCEDURE — 83735 ASSAY OF MAGNESIUM: CPT | Performed by: STUDENT IN AN ORGANIZED HEALTH CARE EDUCATION/TRAINING PROGRAM

## 2021-10-14 PROCEDURE — 25000003 PHARM REV CODE 250: Performed by: INTERNAL MEDICINE

## 2021-10-14 PROCEDURE — 99232 SBSQ HOSP IP/OBS MODERATE 35: CPT | Mod: ,,, | Performed by: INTERNAL MEDICINE

## 2021-10-14 PROCEDURE — 63600175 PHARM REV CODE 636 W HCPCS: Performed by: INTERNAL MEDICINE

## 2021-10-14 PROCEDURE — 11000001 HC ACUTE MED/SURG PRIVATE ROOM

## 2021-10-14 PROCEDURE — 80053 COMPREHEN METABOLIC PANEL: CPT | Performed by: STUDENT IN AN ORGANIZED HEALTH CARE EDUCATION/TRAINING PROGRAM

## 2021-10-14 PROCEDURE — 84100 ASSAY OF PHOSPHORUS: CPT | Performed by: STUDENT IN AN ORGANIZED HEALTH CARE EDUCATION/TRAINING PROGRAM

## 2021-10-14 PROCEDURE — 99232 PR SUBSEQUENT HOSPITAL CARE,LEVL II: ICD-10-PCS | Mod: ,,, | Performed by: INTERNAL MEDICINE

## 2021-10-14 PROCEDURE — 36415 COLL VENOUS BLD VENIPUNCTURE: CPT | Performed by: INTERNAL MEDICINE

## 2021-10-14 PROCEDURE — 25000003 PHARM REV CODE 250: Performed by: NURSE PRACTITIONER

## 2021-10-14 PROCEDURE — 99232 SBSQ HOSP IP/OBS MODERATE 35: CPT | Mod: GC,,, | Performed by: INTERNAL MEDICINE

## 2021-10-14 PROCEDURE — 63600175 PHARM REV CODE 636 W HCPCS: Performed by: STUDENT IN AN ORGANIZED HEALTH CARE EDUCATION/TRAINING PROGRAM

## 2021-10-14 PROCEDURE — 25000003 PHARM REV CODE 250: Performed by: PODIATRIST

## 2021-10-14 PROCEDURE — 85025 COMPLETE CBC W/AUTO DIFF WBC: CPT | Performed by: STUDENT IN AN ORGANIZED HEALTH CARE EDUCATION/TRAINING PROGRAM

## 2021-10-14 PROCEDURE — 80197 ASSAY OF TACROLIMUS: CPT | Performed by: STUDENT IN AN ORGANIZED HEALTH CARE EDUCATION/TRAINING PROGRAM

## 2021-10-14 RX ORDER — NIFEDIPINE 60 MG/1
60 TABLET, EXTENDED RELEASE ORAL DAILY
Status: DISCONTINUED | OUTPATIENT
Start: 2021-10-14 | End: 2021-10-16 | Stop reason: HOSPADM

## 2021-10-14 RX ORDER — SODIUM,POTASSIUM PHOSPHATES 280-250MG
1 POWDER IN PACKET (EA) ORAL
Status: COMPLETED | OUTPATIENT
Start: 2021-10-14 | End: 2021-10-14

## 2021-10-14 RX ORDER — METOPROLOL SUCCINATE 50 MG/1
200 TABLET, EXTENDED RELEASE ORAL DAILY
Status: DISCONTINUED | OUTPATIENT
Start: 2021-10-15 | End: 2021-10-16 | Stop reason: HOSPADM

## 2021-10-14 RX ORDER — METRONIDAZOLE 500 MG/1
500 TABLET ORAL EVERY 8 HOURS
Status: DISCONTINUED | OUTPATIENT
Start: 2021-10-14 | End: 2021-10-15

## 2021-10-14 RX ORDER — MAGNESIUM SULFATE HEPTAHYDRATE 40 MG/ML
2 INJECTION, SOLUTION INTRAVENOUS ONCE
Status: COMPLETED | OUTPATIENT
Start: 2021-10-14 | End: 2021-10-14

## 2021-10-14 RX ADMIN — INSULIN ASPART 20 UNITS: 100 INJECTION, SOLUTION INTRAVENOUS; SUBCUTANEOUS at 12:10

## 2021-10-14 RX ADMIN — INSULIN ASPART 20 UNITS: 100 INJECTION, SOLUTION INTRAVENOUS; SUBCUTANEOUS at 08:10

## 2021-10-14 RX ADMIN — INSULIN ASPART 6 UNITS: 100 INJECTION, SOLUTION INTRAVENOUS; SUBCUTANEOUS at 05:10

## 2021-10-14 RX ADMIN — GABAPENTIN 800 MG: 400 CAPSULE ORAL at 08:10

## 2021-10-14 RX ADMIN — MUPIROCIN: 20 OINTMENT TOPICAL at 08:10

## 2021-10-14 RX ADMIN — POTASSIUM & SODIUM PHOSPHATES POWDER PACK 280-160-250 MG 1 PACKET: 280-160-250 PACK at 12:10

## 2021-10-14 RX ADMIN — METRONIDAZOLE 500 MG: 500 TABLET ORAL at 09:10

## 2021-10-14 RX ADMIN — VANCOMYCIN HYDROCHLORIDE 1750 MG: 10 INJECTION, POWDER, LYOPHILIZED, FOR SOLUTION INTRAVENOUS at 01:10

## 2021-10-14 RX ADMIN — CEFEPIME 1 G: 1 INJECTION, POWDER, FOR SOLUTION INTRAMUSCULAR; INTRAVENOUS at 06:10

## 2021-10-14 RX ADMIN — HYDROCODONE BITARTRATE AND ACETAMINOPHEN 1 TABLET: 5; 325 TABLET ORAL at 09:10

## 2021-10-14 RX ADMIN — METOPROLOL SUCCINATE 100 MG: 50 TABLET, EXTENDED RELEASE ORAL at 08:10

## 2021-10-14 RX ADMIN — METRONIDAZOLE 500 MG: 500 TABLET ORAL at 01:10

## 2021-10-14 RX ADMIN — NIFEDIPINE 60 MG: 60 TABLET, EXTENDED RELEASE ORAL at 08:10

## 2021-10-14 RX ADMIN — INSULIN ASPART 6 UNITS: 100 INJECTION, SOLUTION INTRAVENOUS; SUBCUTANEOUS at 08:10

## 2021-10-14 RX ADMIN — INSULIN ASPART 3 UNITS: 100 INJECTION, SOLUTION INTRAVENOUS; SUBCUTANEOUS at 09:10

## 2021-10-14 RX ADMIN — INSULIN DETEMIR 90 UNITS: 100 INJECTION, SOLUTION SUBCUTANEOUS at 09:10

## 2021-10-14 RX ADMIN — HYDROCODONE BITARTRATE AND ACETAMINOPHEN 1 TABLET: 5; 325 TABLET ORAL at 08:10

## 2021-10-14 RX ADMIN — LISINOPRIL 40 MG: 20 TABLET ORAL at 08:10

## 2021-10-14 RX ADMIN — INSULIN ASPART 20 UNITS: 100 INJECTION, SOLUTION INTRAVENOUS; SUBCUTANEOUS at 05:10

## 2021-10-14 RX ADMIN — GABAPENTIN 800 MG: 400 CAPSULE ORAL at 02:10

## 2021-10-14 RX ADMIN — INSULIN ASPART 6 UNITS: 100 INJECTION, SOLUTION INTRAVENOUS; SUBCUTANEOUS at 12:10

## 2021-10-14 RX ADMIN — MAGNESIUM SULFATE IN WATER 2 G: 40 INJECTION, SOLUTION INTRAVENOUS at 08:10

## 2021-10-14 RX ADMIN — TACROLIMUS 1 MG: 1 CAPSULE ORAL at 05:10

## 2021-10-14 RX ADMIN — HYDROCODONE BITARTRATE AND ACETAMINOPHEN 1 TABLET: 5; 325 TABLET ORAL at 02:10

## 2021-10-14 RX ADMIN — METRONIDAZOLE 500 MG: 500 INJECTION, SOLUTION INTRAVENOUS at 06:10

## 2021-10-14 RX ADMIN — ATORVASTATIN CALCIUM 80 MG: 40 TABLET, FILM COATED ORAL at 09:10

## 2021-10-14 RX ADMIN — ASPIRIN 81 MG: 81 TABLET, COATED ORAL at 08:10

## 2021-10-14 RX ADMIN — ENOXAPARIN SODIUM 40 MG: 40 INJECTION, SOLUTION INTRAVENOUS; SUBCUTANEOUS at 05:10

## 2021-10-14 RX ADMIN — GABAPENTIN 800 MG: 400 CAPSULE ORAL at 09:10

## 2021-10-14 RX ADMIN — TACROLIMUS 1 MG: 1 CAPSULE ORAL at 08:10

## 2021-10-15 ENCOUNTER — TELEPHONE (OUTPATIENT)
Dept: CARDIOLOGY | Facility: CLINIC | Age: 66
End: 2021-10-15

## 2021-10-15 LAB
ALBUMIN SERPL BCP-MCNC: 3.3 G/DL (ref 3.5–5.2)
ALP SERPL-CCNC: 94 U/L (ref 55–135)
ALT SERPL W/O P-5'-P-CCNC: 27 U/L (ref 10–44)
ANION GAP SERPL CALC-SCNC: 9 MMOL/L (ref 8–16)
AST SERPL-CCNC: 26 U/L (ref 10–40)
BACTERIA SPEC AEROBE CULT: ABNORMAL
BASOPHILS # BLD AUTO: 0.03 K/UL (ref 0–0.2)
BASOPHILS NFR BLD: 0.3 % (ref 0–1.9)
BILIRUB SERPL-MCNC: 0.3 MG/DL (ref 0.1–1)
BUN SERPL-MCNC: 15 MG/DL (ref 8–23)
CALCIUM SERPL-MCNC: 8.8 MG/DL (ref 8.7–10.5)
CHLORIDE SERPL-SCNC: 102 MMOL/L (ref 95–110)
CO2 SERPL-SCNC: 23 MMOL/L (ref 23–29)
CREAT SERPL-MCNC: 1.3 MG/DL (ref 0.5–1.4)
DIFFERENTIAL METHOD: ABNORMAL
EOSINOPHIL # BLD AUTO: 0.6 K/UL (ref 0–0.5)
EOSINOPHIL NFR BLD: 6.7 % (ref 0–8)
ERYTHROCYTE [DISTWIDTH] IN BLOOD BY AUTOMATED COUNT: 13.2 % (ref 11.5–14.5)
EST. GFR  (AFRICAN AMERICAN): >60 ML/MIN/1.73 M^2
EST. GFR  (NON AFRICAN AMERICAN): 58 ML/MIN/1.73 M^2
GLUCOSE SERPL-MCNC: 349 MG/DL (ref 70–110)
HCT VFR BLD AUTO: 36.1 % (ref 40–54)
HGB BLD-MCNC: 12.2 G/DL (ref 14–18)
IMM GRANULOCYTES # BLD AUTO: 0.04 K/UL (ref 0–0.04)
IMM GRANULOCYTES NFR BLD AUTO: 0.4 % (ref 0–0.5)
LYMPHOCYTES # BLD AUTO: 2.8 K/UL (ref 1–4.8)
LYMPHOCYTES NFR BLD: 29.9 % (ref 18–48)
MAGNESIUM SERPL-MCNC: 1.5 MG/DL (ref 1.6–2.6)
MCH RBC QN AUTO: 29.6 PG (ref 27–31)
MCHC RBC AUTO-ENTMCNC: 33.8 G/DL (ref 32–36)
MCV RBC AUTO: 88 FL (ref 82–98)
MONOCYTES # BLD AUTO: 0.7 K/UL (ref 0.3–1)
MONOCYTES NFR BLD: 7.1 % (ref 4–15)
NEUTROPHILS # BLD AUTO: 5.2 K/UL (ref 1.8–7.7)
NEUTROPHILS NFR BLD: 55.6 % (ref 38–73)
NRBC BLD-RTO: 0 /100 WBC
PHOSPHATE SERPL-MCNC: 2.5 MG/DL (ref 2.7–4.5)
PLATELET # BLD AUTO: 123 K/UL (ref 150–450)
PMV BLD AUTO: 13 FL (ref 9.2–12.9)
POCT GLUCOSE: 112 MG/DL (ref 70–110)
POCT GLUCOSE: 263 MG/DL (ref 70–110)
POCT GLUCOSE: 264 MG/DL (ref 70–110)
POCT GLUCOSE: 397 MG/DL (ref 70–110)
POCT GLUCOSE: 83 MG/DL (ref 70–110)
POTASSIUM SERPL-SCNC: 4.6 MMOL/L (ref 3.5–5.1)
PROT SERPL-MCNC: 6.6 G/DL (ref 6–8.4)
RBC # BLD AUTO: 4.12 M/UL (ref 4.6–6.2)
SODIUM SERPL-SCNC: 134 MMOL/L (ref 136–145)
VANCOMYCIN TROUGH SERPL-MCNC: 10.4 UG/ML (ref 10–22)
WBC # BLD AUTO: 9.31 K/UL (ref 3.9–12.7)

## 2021-10-15 PROCEDURE — 25000003 PHARM REV CODE 250: Performed by: INTERNAL MEDICINE

## 2021-10-15 PROCEDURE — 80197 ASSAY OF TACROLIMUS: CPT | Performed by: STUDENT IN AN ORGANIZED HEALTH CARE EDUCATION/TRAINING PROGRAM

## 2021-10-15 PROCEDURE — 63600175 PHARM REV CODE 636 W HCPCS: Performed by: INTERNAL MEDICINE

## 2021-10-15 PROCEDURE — C9399 UNCLASSIFIED DRUGS OR BIOLOG: HCPCS | Performed by: STUDENT IN AN ORGANIZED HEALTH CARE EDUCATION/TRAINING PROGRAM

## 2021-10-15 PROCEDURE — 25000003 PHARM REV CODE 250: Performed by: STUDENT IN AN ORGANIZED HEALTH CARE EDUCATION/TRAINING PROGRAM

## 2021-10-15 PROCEDURE — 25000003 PHARM REV CODE 250: Performed by: NURSE PRACTITIONER

## 2021-10-15 PROCEDURE — 63600175 PHARM REV CODE 636 W HCPCS: Performed by: STUDENT IN AN ORGANIZED HEALTH CARE EDUCATION/TRAINING PROGRAM

## 2021-10-15 PROCEDURE — 11000001 HC ACUTE MED/SURG PRIVATE ROOM

## 2021-10-15 PROCEDURE — 80053 COMPREHEN METABOLIC PANEL: CPT | Performed by: STUDENT IN AN ORGANIZED HEALTH CARE EDUCATION/TRAINING PROGRAM

## 2021-10-15 PROCEDURE — 36415 COLL VENOUS BLD VENIPUNCTURE: CPT | Performed by: INTERNAL MEDICINE

## 2021-10-15 PROCEDURE — 25000003 PHARM REV CODE 250: Performed by: PODIATRIST

## 2021-10-15 PROCEDURE — 80202 ASSAY OF VANCOMYCIN: CPT | Performed by: INTERNAL MEDICINE

## 2021-10-15 PROCEDURE — 83735 ASSAY OF MAGNESIUM: CPT | Performed by: STUDENT IN AN ORGANIZED HEALTH CARE EDUCATION/TRAINING PROGRAM

## 2021-10-15 PROCEDURE — 36415 COLL VENOUS BLD VENIPUNCTURE: CPT | Performed by: STUDENT IN AN ORGANIZED HEALTH CARE EDUCATION/TRAINING PROGRAM

## 2021-10-15 PROCEDURE — 84100 ASSAY OF PHOSPHORUS: CPT | Performed by: STUDENT IN AN ORGANIZED HEALTH CARE EDUCATION/TRAINING PROGRAM

## 2021-10-15 PROCEDURE — 85025 COMPLETE CBC W/AUTO DIFF WBC: CPT | Performed by: STUDENT IN AN ORGANIZED HEALTH CARE EDUCATION/TRAINING PROGRAM

## 2021-10-15 RX ORDER — MAGNESIUM SULFATE HEPTAHYDRATE 40 MG/ML
2 INJECTION, SOLUTION INTRAVENOUS ONCE
Status: COMPLETED | OUTPATIENT
Start: 2021-10-15 | End: 2021-10-15

## 2021-10-15 RX ORDER — INSULIN ASPART 100 [IU]/ML
30 INJECTION, SOLUTION INTRAVENOUS; SUBCUTANEOUS
Status: DISCONTINUED | OUTPATIENT
Start: 2021-10-15 | End: 2021-10-16 | Stop reason: HOSPADM

## 2021-10-15 RX ADMIN — TACROLIMUS 1 MG: 1 CAPSULE ORAL at 08:10

## 2021-10-15 RX ADMIN — ENOXAPARIN SODIUM 40 MG: 40 INJECTION, SOLUTION INTRAVENOUS; SUBCUTANEOUS at 05:10

## 2021-10-15 RX ADMIN — HYDROCODONE BITARTRATE AND ACETAMINOPHEN 1 TABLET: 5; 325 TABLET ORAL at 05:10

## 2021-10-15 RX ADMIN — INSULIN ASPART 10 UNITS: 100 INJECTION, SOLUTION INTRAVENOUS; SUBCUTANEOUS at 08:10

## 2021-10-15 RX ADMIN — HYDROCODONE BITARTRATE AND ACETAMINOPHEN 1 TABLET: 5; 325 TABLET ORAL at 08:10

## 2021-10-15 RX ADMIN — METRONIDAZOLE 500 MG: 500 TABLET ORAL at 05:10

## 2021-10-15 RX ADMIN — INSULIN ASPART 3 UNITS: 100 INJECTION, SOLUTION INTRAVENOUS; SUBCUTANEOUS at 08:10

## 2021-10-15 RX ADMIN — NIFEDIPINE 60 MG: 60 TABLET, EXTENDED RELEASE ORAL at 08:10

## 2021-10-15 RX ADMIN — GABAPENTIN 800 MG: 400 CAPSULE ORAL at 08:10

## 2021-10-15 RX ADMIN — ASPIRIN 81 MG: 81 TABLET, COATED ORAL at 08:10

## 2021-10-15 RX ADMIN — INSULIN ASPART 30 UNITS: 100 INJECTION, SOLUTION INTRAVENOUS; SUBCUTANEOUS at 04:10

## 2021-10-15 RX ADMIN — HYDROCODONE BITARTRATE AND ACETAMINOPHEN 1 TABLET: 5; 325 TABLET ORAL at 12:10

## 2021-10-15 RX ADMIN — TACROLIMUS 1 MG: 1 CAPSULE ORAL at 05:10

## 2021-10-15 RX ADMIN — VANCOMYCIN HYDROCHLORIDE 1750 MG: 10 INJECTION, POWDER, LYOPHILIZED, FOR SOLUTION INTRAVENOUS at 02:10

## 2021-10-15 RX ADMIN — MUPIROCIN: 20 OINTMENT TOPICAL at 08:10

## 2021-10-15 RX ADMIN — INSULIN DETEMIR 90 UNITS: 100 INJECTION, SOLUTION SUBCUTANEOUS at 08:10

## 2021-10-15 RX ADMIN — METOPROLOL SUCCINATE 200 MG: 50 TABLET, EXTENDED RELEASE ORAL at 08:10

## 2021-10-15 RX ADMIN — INSULIN ASPART 30 UNITS: 100 INJECTION, SOLUTION INTRAVENOUS; SUBCUTANEOUS at 08:10

## 2021-10-15 RX ADMIN — MAGNESIUM SULFATE IN WATER 2 G: 40 INJECTION, SOLUTION INTRAVENOUS at 09:10

## 2021-10-15 RX ADMIN — INSULIN ASPART 30 UNITS: 100 INJECTION, SOLUTION INTRAVENOUS; SUBCUTANEOUS at 12:10

## 2021-10-15 RX ADMIN — GABAPENTIN 800 MG: 400 CAPSULE ORAL at 02:10

## 2021-10-15 RX ADMIN — LISINOPRIL 40 MG: 20 TABLET ORAL at 09:10

## 2021-10-15 RX ADMIN — ATORVASTATIN CALCIUM 80 MG: 40 TABLET, FILM COATED ORAL at 08:10

## 2021-10-16 VITALS
DIASTOLIC BLOOD PRESSURE: 67 MMHG | HEIGHT: 75 IN | WEIGHT: 224.94 LBS | BODY MASS INDEX: 27.97 KG/M2 | RESPIRATION RATE: 17 BRPM | TEMPERATURE: 98 F | HEART RATE: 71 BPM | SYSTOLIC BLOOD PRESSURE: 127 MMHG | OXYGEN SATURATION: 99 %

## 2021-10-16 LAB
ALBUMIN SERPL BCP-MCNC: 3.4 G/DL (ref 3.5–5.2)
ALP SERPL-CCNC: 82 U/L (ref 55–135)
ALT SERPL W/O P-5'-P-CCNC: 36 U/L (ref 10–44)
ANION GAP SERPL CALC-SCNC: 4 MMOL/L (ref 8–16)
AST SERPL-CCNC: 33 U/L (ref 10–40)
BACTERIA BLD CULT: NORMAL
BACTERIA BLD CULT: NORMAL
BASOPHILS # BLD AUTO: 0.03 K/UL (ref 0–0.2)
BASOPHILS NFR BLD: 0.3 % (ref 0–1.9)
BILIRUB SERPL-MCNC: 0.2 MG/DL (ref 0.1–1)
BUN SERPL-MCNC: 16 MG/DL (ref 8–23)
CALCIUM SERPL-MCNC: 9 MG/DL (ref 8.7–10.5)
CHLORIDE SERPL-SCNC: 102 MMOL/L (ref 95–110)
CO2 SERPL-SCNC: 29 MMOL/L (ref 23–29)
CREAT SERPL-MCNC: 1.2 MG/DL (ref 0.5–1.4)
DIFFERENTIAL METHOD: ABNORMAL
EOSINOPHIL # BLD AUTO: 0.5 K/UL (ref 0–0.5)
EOSINOPHIL NFR BLD: 5.8 % (ref 0–8)
ERYTHROCYTE [DISTWIDTH] IN BLOOD BY AUTOMATED COUNT: 13.2 % (ref 11.5–14.5)
EST. GFR  (AFRICAN AMERICAN): >60 ML/MIN/1.73 M^2
EST. GFR  (NON AFRICAN AMERICAN): >60 ML/MIN/1.73 M^2
GLUCOSE SERPL-MCNC: 315 MG/DL (ref 70–110)
HCT VFR BLD AUTO: 38 % (ref 40–54)
HGB BLD-MCNC: 12.6 G/DL (ref 14–18)
IMM GRANULOCYTES # BLD AUTO: 0.05 K/UL (ref 0–0.04)
IMM GRANULOCYTES NFR BLD AUTO: 0.6 % (ref 0–0.5)
LYMPHOCYTES # BLD AUTO: 2.6 K/UL (ref 1–4.8)
LYMPHOCYTES NFR BLD: 29.1 % (ref 18–48)
MAGNESIUM SERPL-MCNC: 1.7 MG/DL (ref 1.6–2.6)
MCH RBC QN AUTO: 29.2 PG (ref 27–31)
MCHC RBC AUTO-ENTMCNC: 33.2 G/DL (ref 32–36)
MCV RBC AUTO: 88 FL (ref 82–98)
MONOCYTES # BLD AUTO: 0.7 K/UL (ref 0.3–1)
MONOCYTES NFR BLD: 7.7 % (ref 4–15)
NEUTROPHILS # BLD AUTO: 5 K/UL (ref 1.8–7.7)
NEUTROPHILS NFR BLD: 56.5 % (ref 38–73)
NRBC BLD-RTO: 0 /100 WBC
PHOSPHATE SERPL-MCNC: 2.7 MG/DL (ref 2.7–4.5)
PLATELET # BLD AUTO: 116 K/UL (ref 150–450)
PMV BLD AUTO: 12.6 FL (ref 9.2–12.9)
POCT GLUCOSE: 204 MG/DL (ref 70–110)
POCT GLUCOSE: 231 MG/DL (ref 70–110)
POCT GLUCOSE: 291 MG/DL (ref 70–110)
POTASSIUM SERPL-SCNC: 4.8 MMOL/L (ref 3.5–5.1)
PROT SERPL-MCNC: 6.8 G/DL (ref 6–8.4)
RBC # BLD AUTO: 4.31 M/UL (ref 4.6–6.2)
SODIUM SERPL-SCNC: 135 MMOL/L (ref 136–145)
TACROLIMUS BLD-MCNC: 3.9 NG/ML (ref 5–15)
TACROLIMUS, NORMALIZED: 3.5 NG/ML (ref 5–15)
WBC # BLD AUTO: 8.83 K/UL (ref 3.9–12.7)

## 2021-10-16 PROCEDURE — 25000003 PHARM REV CODE 250: Performed by: PODIATRIST

## 2021-10-16 PROCEDURE — 83735 ASSAY OF MAGNESIUM: CPT | Performed by: STUDENT IN AN ORGANIZED HEALTH CARE EDUCATION/TRAINING PROGRAM

## 2021-10-16 PROCEDURE — 36415 COLL VENOUS BLD VENIPUNCTURE: CPT | Performed by: STUDENT IN AN ORGANIZED HEALTH CARE EDUCATION/TRAINING PROGRAM

## 2021-10-16 PROCEDURE — 84100 ASSAY OF PHOSPHORUS: CPT | Performed by: STUDENT IN AN ORGANIZED HEALTH CARE EDUCATION/TRAINING PROGRAM

## 2021-10-16 PROCEDURE — C9399 UNCLASSIFIED DRUGS OR BIOLOG: HCPCS | Performed by: STUDENT IN AN ORGANIZED HEALTH CARE EDUCATION/TRAINING PROGRAM

## 2021-10-16 PROCEDURE — 63600175 PHARM REV CODE 636 W HCPCS: Performed by: STUDENT IN AN ORGANIZED HEALTH CARE EDUCATION/TRAINING PROGRAM

## 2021-10-16 PROCEDURE — 25000003 PHARM REV CODE 250: Performed by: NURSE PRACTITIONER

## 2021-10-16 PROCEDURE — 80053 COMPREHEN METABOLIC PANEL: CPT | Performed by: STUDENT IN AN ORGANIZED HEALTH CARE EDUCATION/TRAINING PROGRAM

## 2021-10-16 PROCEDURE — 85025 COMPLETE CBC W/AUTO DIFF WBC: CPT | Performed by: STUDENT IN AN ORGANIZED HEALTH CARE EDUCATION/TRAINING PROGRAM

## 2021-10-16 PROCEDURE — 63600175 PHARM REV CODE 636 W HCPCS: Performed by: INTERNAL MEDICINE

## 2021-10-16 PROCEDURE — 25000003 PHARM REV CODE 250: Performed by: STUDENT IN AN ORGANIZED HEALTH CARE EDUCATION/TRAINING PROGRAM

## 2021-10-16 PROCEDURE — 80197 ASSAY OF TACROLIMUS: CPT | Performed by: STUDENT IN AN ORGANIZED HEALTH CARE EDUCATION/TRAINING PROGRAM

## 2021-10-16 RX ORDER — TACROLIMUS 1 MG/1
1 CAPSULE ORAL EVERY 12 HOURS
Qty: 60 CAPSULE | Refills: 11 | Status: SHIPPED | OUTPATIENT
Start: 2021-10-16 | End: 2021-10-16

## 2021-10-16 RX ORDER — TACROLIMUS 1 MG/1
CAPSULE ORAL
Qty: 90 CAPSULE | Refills: 11 | Status: SHIPPED | OUTPATIENT
Start: 2021-10-16 | End: 2022-11-11 | Stop reason: SDUPTHER

## 2021-10-16 RX ORDER — CEPHALEXIN 500 MG/1
500 CAPSULE ORAL EVERY 6 HOURS
Qty: 56 CAPSULE | Refills: 0 | Status: SHIPPED | OUTPATIENT
Start: 2021-10-16 | End: 2021-10-30

## 2021-10-16 RX ORDER — CEFAZOLIN SODIUM 2 G/50ML
2 SOLUTION INTRAVENOUS
Status: DISCONTINUED | OUTPATIENT
Start: 2021-10-16 | End: 2021-10-16 | Stop reason: HOSPADM

## 2021-10-16 RX ORDER — HYDROCODONE BITARTRATE AND ACETAMINOPHEN 5; 325 MG/1; MG/1
1 TABLET ORAL EVERY 8 HOURS PRN
Qty: 12 TABLET | Refills: 0 | Status: SHIPPED | OUTPATIENT
Start: 2021-10-16 | End: 2021-10-17 | Stop reason: SDUPTHER

## 2021-10-16 RX ADMIN — INSULIN ASPART 30 UNITS: 100 INJECTION, SOLUTION INTRAVENOUS; SUBCUTANEOUS at 11:10

## 2021-10-16 RX ADMIN — NIFEDIPINE 60 MG: 60 TABLET, EXTENDED RELEASE ORAL at 09:10

## 2021-10-16 RX ADMIN — MUPIROCIN: 20 OINTMENT TOPICAL at 09:10

## 2021-10-16 RX ADMIN — INSULIN ASPART 4 UNITS: 100 INJECTION, SOLUTION INTRAVENOUS; SUBCUTANEOUS at 07:10

## 2021-10-16 RX ADMIN — GABAPENTIN 800 MG: 400 CAPSULE ORAL at 03:10

## 2021-10-16 RX ADMIN — INSULIN DETEMIR 25 UNITS: 100 INJECTION, SOLUTION SUBCUTANEOUS at 10:10

## 2021-10-16 RX ADMIN — METOPROLOL SUCCINATE 200 MG: 50 TABLET, EXTENDED RELEASE ORAL at 09:10

## 2021-10-16 RX ADMIN — LISINOPRIL 40 MG: 20 TABLET ORAL at 09:10

## 2021-10-16 RX ADMIN — ASPIRIN 81 MG: 81 TABLET, COATED ORAL at 09:10

## 2021-10-16 RX ADMIN — INSULIN ASPART 4 UNITS: 100 INJECTION, SOLUTION INTRAVENOUS; SUBCUTANEOUS at 11:10

## 2021-10-16 RX ADMIN — HYDROCODONE BITARTRATE AND ACETAMINOPHEN 1 TABLET: 5; 325 TABLET ORAL at 07:10

## 2021-10-16 RX ADMIN — TACROLIMUS 1 MG: 1 CAPSULE ORAL at 09:10

## 2021-10-16 RX ADMIN — INSULIN ASPART 30 UNITS: 100 INJECTION, SOLUTION INTRAVENOUS; SUBCUTANEOUS at 07:10

## 2021-10-16 RX ADMIN — CEFAZOLIN SODIUM 2 G: 2 SOLUTION INTRAVENOUS at 10:10

## 2021-10-16 RX ADMIN — GABAPENTIN 800 MG: 400 CAPSULE ORAL at 09:10

## 2021-10-17 LAB
TACROLIMUS BLD-MCNC: 3.8 NG/ML (ref 5–15)
TACROLIMUS, NORMALIZED: 3.4 NG/ML (ref 5–15)

## 2021-10-17 RX ORDER — HYDROCODONE BITARTRATE AND ACETAMINOPHEN 5; 325 MG/1; MG/1
1 TABLET ORAL EVERY 8 HOURS PRN
Qty: 12 TABLET | Refills: 0 | Status: ON HOLD | OUTPATIENT
Start: 2021-10-17 | End: 2021-10-29 | Stop reason: SDUPTHER

## 2021-10-18 ENCOUNTER — PATIENT OUTREACH (OUTPATIENT)
Dept: ADMINISTRATIVE | Facility: OTHER | Age: 66
End: 2021-10-18

## 2021-10-18 LAB — BACTERIA SPEC ANAEROBE CULT: NORMAL

## 2021-10-19 ENCOUNTER — TELEPHONE (OUTPATIENT)
Dept: TRANSPLANT | Facility: CLINIC | Age: 66
End: 2021-10-19

## 2021-10-20 RX ORDER — ROSUVASTATIN CALCIUM 5 MG/1
5 TABLET, COATED ORAL DAILY
Qty: 90 TABLET | Refills: 0 | Status: SHIPPED | OUTPATIENT
Start: 2021-10-20 | End: 2021-12-06

## 2021-10-20 RX ORDER — NIFEDIPINE 60 MG/1
60 TABLET, EXTENDED RELEASE ORAL DAILY
Qty: 90 TABLET | Refills: 0 | Status: SHIPPED | OUTPATIENT
Start: 2021-10-20 | End: 2021-12-06

## 2021-10-21 ENCOUNTER — PATIENT OUTREACH (OUTPATIENT)
Dept: ADMINISTRATIVE | Facility: OTHER | Age: 66
End: 2021-10-21

## 2021-10-28 ENCOUNTER — ANESTHESIA EVENT (OUTPATIENT)
Dept: SURGERY | Facility: HOSPITAL | Age: 66
End: 2021-10-28
Payer: MEDICARE

## 2021-10-29 ENCOUNTER — ANESTHESIA (OUTPATIENT)
Dept: SURGERY | Facility: HOSPITAL | Age: 66
End: 2021-10-29
Payer: MEDICARE

## 2021-10-29 ENCOUNTER — HOSPITAL ENCOUNTER (OUTPATIENT)
Facility: HOSPITAL | Age: 66
Discharge: HOME | End: 2021-10-29
Attending: ORTHOPAEDIC SURGERY | Admitting: ORTHOPAEDIC SURGERY
Payer: MEDICARE

## 2021-10-29 VITALS
RESPIRATION RATE: 18 BRPM | WEIGHT: 220 LBS | BODY MASS INDEX: 29.16 KG/M2 | HEART RATE: 78 BPM | SYSTOLIC BLOOD PRESSURE: 123 MMHG | OXYGEN SATURATION: 99 % | HEIGHT: 73 IN | DIASTOLIC BLOOD PRESSURE: 77 MMHG | TEMPERATURE: 98 F

## 2021-10-29 DIAGNOSIS — G56.02 CARPAL TUNNEL SYNDROME OF LEFT WRIST: ICD-10-CM

## 2021-10-29 LAB
ANION GAP SERPL CALC-SCNC: 13 MMOL/L (ref 8–16)
BUN SERPL-MCNC: 29 MG/DL (ref 8–23)
CALCIUM SERPL-MCNC: 9.6 MG/DL (ref 8.7–10.5)
CHLORIDE SERPL-SCNC: 104 MMOL/L (ref 95–110)
CO2 SERPL-SCNC: 23 MMOL/L (ref 23–29)
CREAT SERPL-MCNC: 1.4 MG/DL (ref 0.5–1.4)
EST. GFR  (AFRICAN AMERICAN): >60 ML/MIN/1.73 M^2
EST. GFR  (NON AFRICAN AMERICAN): 53 ML/MIN/1.73 M^2
GLUCOSE SERPL-MCNC: 125 MG/DL (ref 70–110)
POTASSIUM SERPL-SCNC: 4 MMOL/L (ref 3.5–5.1)
SODIUM SERPL-SCNC: 140 MMOL/L (ref 136–145)

## 2021-10-29 PROCEDURE — 64721 PR REVISE MEDIAN N/CARPAL TUNNEL SURG: ICD-10-PCS | Mod: 51,LT,, | Performed by: ORTHOPAEDIC SURGERY

## 2021-10-29 PROCEDURE — 37000008 HC ANESTHESIA 1ST 15 MINUTES: Performed by: ORTHOPAEDIC SURGERY

## 2021-10-29 PROCEDURE — 64721 CARPAL TUNNEL SURGERY: CPT | Mod: 51,LT,, | Performed by: ORTHOPAEDIC SURGERY

## 2021-10-29 PROCEDURE — 71000015 HC POSTOP RECOV 1ST HR: Performed by: ORTHOPAEDIC SURGERY

## 2021-10-29 PROCEDURE — 64718 REVISE ULNAR NERVE AT ELBOW: CPT | Mod: LT,,, | Performed by: ORTHOPAEDIC SURGERY

## 2021-10-29 PROCEDURE — 36000707: Performed by: ORTHOPAEDIC SURGERY

## 2021-10-29 PROCEDURE — 64718 PR REVISE ULNAR NERVE AT ELBOW: ICD-10-PCS | Mod: LT,,, | Performed by: ORTHOPAEDIC SURGERY

## 2021-10-29 PROCEDURE — 25000003 PHARM REV CODE 250: Performed by: ORTHOPAEDIC SURGERY

## 2021-10-29 PROCEDURE — 63600175 PHARM REV CODE 636 W HCPCS: Performed by: NURSE ANESTHETIST, CERTIFIED REGISTERED

## 2021-10-29 PROCEDURE — 36415 COLL VENOUS BLD VENIPUNCTURE: CPT | Performed by: ORTHOPAEDIC SURGERY

## 2021-10-29 PROCEDURE — 25000003 PHARM REV CODE 250: Performed by: NURSE ANESTHETIST, CERTIFIED REGISTERED

## 2021-10-29 PROCEDURE — 71000016 HC POSTOP RECOV ADDL HR: Performed by: ORTHOPAEDIC SURGERY

## 2021-10-29 PROCEDURE — 80048 BASIC METABOLIC PNL TOTAL CA: CPT | Performed by: ORTHOPAEDIC SURGERY

## 2021-10-29 PROCEDURE — 63600175 PHARM REV CODE 636 W HCPCS: Performed by: ORTHOPAEDIC SURGERY

## 2021-10-29 PROCEDURE — 36000706: Performed by: ORTHOPAEDIC SURGERY

## 2021-10-29 PROCEDURE — 37000009 HC ANESTHESIA EA ADD 15 MINS: Performed by: ORTHOPAEDIC SURGERY

## 2021-10-29 RX ORDER — PROPOFOL 10 MG/ML
VIAL (ML) INTRAVENOUS CONTINUOUS PRN
Status: DISCONTINUED | OUTPATIENT
Start: 2021-10-29 | End: 2021-10-29

## 2021-10-29 RX ORDER — LIDOCAINE HYDROCHLORIDE 20 MG/ML
INJECTION, SOLUTION EPIDURAL; INFILTRATION; INTRACAUDAL; PERINEURAL
Status: DISCONTINUED | OUTPATIENT
Start: 2021-10-29 | End: 2021-10-29

## 2021-10-29 RX ORDER — HYDROCODONE BITARTRATE AND ACETAMINOPHEN 5; 325 MG/1; MG/1
1 TABLET ORAL EVERY 4 HOURS PRN
Status: DISCONTINUED | OUTPATIENT
Start: 2021-10-29 | End: 2021-10-29 | Stop reason: HOSPADM

## 2021-10-29 RX ORDER — OXYCODONE HYDROCHLORIDE 5 MG/1
10 TABLET ORAL EVERY 4 HOURS PRN
Status: DISCONTINUED | OUTPATIENT
Start: 2021-10-29 | End: 2021-10-29 | Stop reason: HOSPADM

## 2021-10-29 RX ORDER — DEXMEDETOMIDINE HYDROCHLORIDE 100 UG/ML
INJECTION, SOLUTION INTRAVENOUS
Status: DISCONTINUED | OUTPATIENT
Start: 2021-10-29 | End: 2021-10-29

## 2021-10-29 RX ORDER — HYDROCODONE BITARTRATE AND ACETAMINOPHEN 5; 325 MG/1; MG/1
1 TABLET ORAL EVERY 4 HOURS PRN
Qty: 30 TABLET | Refills: 0 | Status: SHIPPED | OUTPATIENT
Start: 2021-10-29 | End: 2021-11-09 | Stop reason: SDUPTHER

## 2021-10-29 RX ORDER — CEFAZOLIN SODIUM 2 G/50ML
2 SOLUTION INTRAVENOUS
Status: DISCONTINUED | OUTPATIENT
Start: 2021-10-29 | End: 2021-10-29 | Stop reason: HOSPADM

## 2021-10-29 RX ORDER — ONDANSETRON 8 MG/1
8 TABLET, ORALLY DISINTEGRATING ORAL EVERY 8 HOURS PRN
Status: DISCONTINUED | OUTPATIENT
Start: 2021-10-29 | End: 2021-10-29 | Stop reason: HOSPADM

## 2021-10-29 RX ORDER — PROPOFOL 10 MG/ML
VIAL (ML) INTRAVENOUS
Status: DISCONTINUED | OUTPATIENT
Start: 2021-10-29 | End: 2021-10-29

## 2021-10-29 RX ORDER — ACETAMINOPHEN 325 MG/1
650 TABLET ORAL EVERY 4 HOURS PRN
Status: DISCONTINUED | OUTPATIENT
Start: 2021-10-29 | End: 2021-10-29 | Stop reason: HOSPADM

## 2021-10-29 RX ADMIN — GLYCOPYRROLATE 0.1 MG: 0.2 INJECTION, SOLUTION INTRAMUSCULAR; INTRAVITREAL at 09:10

## 2021-10-29 RX ADMIN — PROPOFOL 20 MG: 10 INJECTION, EMULSION INTRAVENOUS at 09:10

## 2021-10-29 RX ADMIN — HYDROCODONE BITARTRATE AND ACETAMINOPHEN 1 TABLET: 5; 325 TABLET ORAL at 10:10

## 2021-10-29 RX ADMIN — SODIUM CHLORIDE, SODIUM LACTATE, POTASSIUM CHLORIDE, AND CALCIUM CHLORIDE: .6; .31; .03; .02 INJECTION, SOLUTION INTRAVENOUS at 09:10

## 2021-10-29 RX ADMIN — DEXMEDETOMIDINE HYDROCHLORIDE 8 MCG: 100 INJECTION, SOLUTION, CONCENTRATE INTRAVENOUS at 09:10

## 2021-10-29 RX ADMIN — LIDOCAINE HYDROCHLORIDE 40 MG: 20 INJECTION, SOLUTION EPIDURAL; INFILTRATION; INTRACAUDAL; PERINEURAL at 09:10

## 2021-10-29 RX ADMIN — PROPOFOL 50 MCG/KG/MIN: 10 INJECTION, EMULSION INTRAVENOUS at 09:10

## 2021-10-29 RX ADMIN — CEFAZOLIN SODIUM 2 G: 2 SOLUTION INTRAVENOUS at 09:10

## 2021-11-01 ENCOUNTER — OFFICE VISIT (OUTPATIENT)
Dept: FAMILY MEDICINE | Facility: CLINIC | Age: 66
End: 2021-11-01
Payer: MEDICARE

## 2021-11-01 ENCOUNTER — OFFICE VISIT (OUTPATIENT)
Dept: ENDOCRINOLOGY | Facility: CLINIC | Age: 66
End: 2021-11-01
Payer: MEDICARE

## 2021-11-01 VITALS
OXYGEN SATURATION: 98 % | SYSTOLIC BLOOD PRESSURE: 120 MMHG | DIASTOLIC BLOOD PRESSURE: 70 MMHG | HEIGHT: 73 IN | HEART RATE: 114 BPM | BODY MASS INDEX: 35.44 KG/M2 | WEIGHT: 267.44 LBS

## 2021-11-01 VITALS
HEIGHT: 73 IN | SYSTOLIC BLOOD PRESSURE: 110 MMHG | HEART RATE: 103 BPM | OXYGEN SATURATION: 98 % | WEIGHT: 267 LBS | BODY MASS INDEX: 35.39 KG/M2 | DIASTOLIC BLOOD PRESSURE: 60 MMHG

## 2021-11-01 DIAGNOSIS — E11.65 TYPE 2 DIABETES MELLITUS WITH HYPERGLYCEMIA, WITH LONG-TERM CURRENT USE OF INSULIN: ICD-10-CM

## 2021-11-01 DIAGNOSIS — I70.0 AORTIC ATHEROSCLEROSIS: ICD-10-CM

## 2021-11-01 DIAGNOSIS — Z79.4 TYPE 2 DIABETES MELLITUS WITH HYPERGLYCEMIA, WITH LONG-TERM CURRENT USE OF INSULIN: ICD-10-CM

## 2021-11-01 DIAGNOSIS — E03.9 HYPOTHYROIDISM, UNSPECIFIED TYPE: ICD-10-CM

## 2021-11-01 DIAGNOSIS — D84.9 IMMUNOSUPPRESSION: ICD-10-CM

## 2021-11-01 DIAGNOSIS — Z94.4 S/P LIVER TRANSPLANT: ICD-10-CM

## 2021-11-01 DIAGNOSIS — S91.331D PENETRATING WOUND OF RIGHT FOOT, SUBSEQUENT ENCOUNTER: ICD-10-CM

## 2021-11-01 DIAGNOSIS — I10 HYPERTENSION, ESSENTIAL: ICD-10-CM

## 2021-11-01 DIAGNOSIS — L08.9 DIABETIC FOOT INFECTION: Primary | ICD-10-CM

## 2021-11-01 DIAGNOSIS — E11.42 DIABETIC POLYNEUROPATHY ASSOCIATED WITH TYPE 2 DIABETES MELLITUS: ICD-10-CM

## 2021-11-01 DIAGNOSIS — E11.628 DIABETIC FOOT INFECTION: Primary | ICD-10-CM

## 2021-11-01 DIAGNOSIS — I73.9 CLAUDICATION IN PERIPHERAL VASCULAR DISEASE: ICD-10-CM

## 2021-11-01 DIAGNOSIS — E11.65 UNCONTROLLED TYPE 2 DIABETES MELLITUS WITH HYPERGLYCEMIA: Primary | ICD-10-CM

## 2021-11-01 DIAGNOSIS — Z12.5 SCREENING FOR MALIGNANT NEOPLASM OF PROSTATE: ICD-10-CM

## 2021-11-01 DIAGNOSIS — E11.65 UNCONTROLLED TYPE 2 DIABETES MELLITUS WITH HYPERGLYCEMIA: ICD-10-CM

## 2021-11-01 DIAGNOSIS — E11.59 HYPERTENSION ASSOCIATED WITH DIABETES: ICD-10-CM

## 2021-11-01 DIAGNOSIS — I15.2 HYPERTENSION ASSOCIATED WITH DIABETES: ICD-10-CM

## 2021-11-01 DIAGNOSIS — E66.01 SEVERE OBESITY (BMI 35.0-39.9) WITH COMORBIDITY: ICD-10-CM

## 2021-11-01 DIAGNOSIS — D69.6 THROMBOCYTOPENIA: ICD-10-CM

## 2021-11-01 PROCEDURE — 1111F DSCHRG MED/CURRENT MED MERGE: CPT | Mod: CPTII,S$GLB,, | Performed by: GENERAL ACUTE CARE HOSPITAL

## 2021-11-01 PROCEDURE — 3072F PR LOW RISK FOR RETINOPATHY: ICD-10-PCS | Mod: CPTII,S$GLB,, | Performed by: FAMILY MEDICINE

## 2021-11-01 PROCEDURE — 90686 FLU VACCINE (QUAD) GREATER THAN OR EQUAL TO 3YO PRESERVATIVE FREE IM: ICD-10-PCS | Mod: S$GLB,,, | Performed by: FAMILY MEDICINE

## 2021-11-01 PROCEDURE — 3072F LOW RISK FOR RETINOPATHY: CPT | Mod: CPTII,S$GLB,, | Performed by: FAMILY MEDICINE

## 2021-11-01 PROCEDURE — 3008F BODY MASS INDEX DOCD: CPT | Mod: CPTII,S$GLB,, | Performed by: FAMILY MEDICINE

## 2021-11-01 PROCEDURE — 99499 UNLISTED E&M SERVICE: CPT | Mod: S$GLB,,, | Performed by: GENERAL ACUTE CARE HOSPITAL

## 2021-11-01 PROCEDURE — 4010F ACE/ARB THERAPY RXD/TAKEN: CPT | Mod: CPTII,S$GLB,, | Performed by: FAMILY MEDICINE

## 2021-11-01 PROCEDURE — 1159F MED LIST DOCD IN RCRD: CPT | Mod: CPTII,S$GLB,, | Performed by: FAMILY MEDICINE

## 2021-11-01 PROCEDURE — 99214 PR OFFICE/OUTPT VISIT, EST, LEVL IV, 30-39 MIN: ICD-10-PCS | Mod: S$GLB,,, | Performed by: FAMILY MEDICINE

## 2021-11-01 PROCEDURE — 3078F PR MOST RECENT DIASTOLIC BLOOD PRESSURE < 80 MM HG: ICD-10-PCS | Mod: CPTII,S$GLB,, | Performed by: GENERAL ACUTE CARE HOSPITAL

## 2021-11-01 PROCEDURE — 3046F HEMOGLOBIN A1C LEVEL >9.0%: CPT | Mod: CPTII,S$GLB,, | Performed by: FAMILY MEDICINE

## 2021-11-01 PROCEDURE — 1159F PR MEDICATION LIST DOCUMENTED IN MEDICAL RECORD: ICD-10-PCS | Mod: CPTII,S$GLB,, | Performed by: FAMILY MEDICINE

## 2021-11-01 PROCEDURE — 99214 PR OFFICE/OUTPT VISIT, EST, LEVL IV, 30-39 MIN: ICD-10-PCS | Mod: S$GLB,,, | Performed by: GENERAL ACUTE CARE HOSPITAL

## 2021-11-01 PROCEDURE — 99999 PR PBB SHADOW E&M-EST. PATIENT-LVL V: ICD-10-PCS | Mod: PBBFAC,,, | Performed by: GENERAL ACUTE CARE HOSPITAL

## 2021-11-01 PROCEDURE — 3046F PR MOST RECENT HEMOGLOBIN A1C LEVEL > 9.0%: ICD-10-PCS | Mod: CPTII,S$GLB,, | Performed by: FAMILY MEDICINE

## 2021-11-01 PROCEDURE — 99999 PR PBB SHADOW E&M-EST. PATIENT-LVL III: CPT | Mod: PBBFAC,,, | Performed by: FAMILY MEDICINE

## 2021-11-01 PROCEDURE — 1111F PR DISCHARGE MEDS RECONCILED W/ CURRENT OUTPATIENT MED LIST: ICD-10-PCS | Mod: CPTII,S$GLB,, | Performed by: GENERAL ACUTE CARE HOSPITAL

## 2021-11-01 PROCEDURE — 99999 PR PBB SHADOW E&M-EST. PATIENT-LVL III: ICD-10-PCS | Mod: PBBFAC,,, | Performed by: FAMILY MEDICINE

## 2021-11-01 PROCEDURE — G0008 ADMIN INFLUENZA VIRUS VAC: HCPCS | Mod: S$GLB,,, | Performed by: FAMILY MEDICINE

## 2021-11-01 PROCEDURE — 3078F DIAST BP <80 MM HG: CPT | Mod: CPTII,S$GLB,, | Performed by: GENERAL ACUTE CARE HOSPITAL

## 2021-11-01 PROCEDURE — 99999 PR PBB SHADOW E&M-EST. PATIENT-LVL V: CPT | Mod: PBBFAC,,, | Performed by: GENERAL ACUTE CARE HOSPITAL

## 2021-11-01 PROCEDURE — 1159F MED LIST DOCD IN RCRD: CPT | Mod: CPTII,S$GLB,, | Performed by: GENERAL ACUTE CARE HOSPITAL

## 2021-11-01 PROCEDURE — 4010F PR ACE/ARB THEARPY RXD/TAKEN: ICD-10-PCS | Mod: CPTII,S$GLB,, | Performed by: FAMILY MEDICINE

## 2021-11-01 PROCEDURE — 3074F PR MOST RECENT SYSTOLIC BLOOD PRESSURE < 130 MM HG: ICD-10-PCS | Mod: CPTII,S$GLB,, | Performed by: GENERAL ACUTE CARE HOSPITAL

## 2021-11-01 PROCEDURE — 3008F PR BODY MASS INDEX (BMI) DOCUMENTED: ICD-10-PCS | Mod: CPTII,S$GLB,, | Performed by: GENERAL ACUTE CARE HOSPITAL

## 2021-11-01 PROCEDURE — 90686 IIV4 VACC NO PRSV 0.5 ML IM: CPT | Mod: S$GLB,,, | Performed by: FAMILY MEDICINE

## 2021-11-01 PROCEDURE — 3008F PR BODY MASS INDEX (BMI) DOCUMENTED: ICD-10-PCS | Mod: CPTII,S$GLB,, | Performed by: FAMILY MEDICINE

## 2021-11-01 PROCEDURE — 99499 UNLISTED E&M SERVICE: CPT | Mod: S$GLB,,, | Performed by: FAMILY MEDICINE

## 2021-11-01 PROCEDURE — 3078F PR MOST RECENT DIASTOLIC BLOOD PRESSURE < 80 MM HG: ICD-10-PCS | Mod: CPTII,S$GLB,, | Performed by: FAMILY MEDICINE

## 2021-11-01 PROCEDURE — 3074F PR MOST RECENT SYSTOLIC BLOOD PRESSURE < 130 MM HG: ICD-10-PCS | Mod: CPTII,S$GLB,, | Performed by: FAMILY MEDICINE

## 2021-11-01 PROCEDURE — 3046F PR MOST RECENT HEMOGLOBIN A1C LEVEL > 9.0%: ICD-10-PCS | Mod: CPTII,S$GLB,, | Performed by: GENERAL ACUTE CARE HOSPITAL

## 2021-11-01 PROCEDURE — 3072F LOW RISK FOR RETINOPATHY: CPT | Mod: CPTII,S$GLB,, | Performed by: GENERAL ACUTE CARE HOSPITAL

## 2021-11-01 PROCEDURE — 3074F SYST BP LT 130 MM HG: CPT | Mod: CPTII,S$GLB,, | Performed by: GENERAL ACUTE CARE HOSPITAL

## 2021-11-01 PROCEDURE — 3074F SYST BP LT 130 MM HG: CPT | Mod: CPTII,S$GLB,, | Performed by: FAMILY MEDICINE

## 2021-11-01 PROCEDURE — 3072F PR LOW RISK FOR RETINOPATHY: ICD-10-PCS | Mod: CPTII,S$GLB,, | Performed by: GENERAL ACUTE CARE HOSPITAL

## 2021-11-01 PROCEDURE — G0008 FLU VACCINE (QUAD) GREATER THAN OR EQUAL TO 3YO PRESERVATIVE FREE IM: ICD-10-PCS | Mod: S$GLB,,, | Performed by: FAMILY MEDICINE

## 2021-11-01 PROCEDURE — 1160F PR REVIEW ALL MEDS BY PRESCRIBER/CLIN PHARMACIST DOCUMENTED: ICD-10-PCS | Mod: CPTII,S$GLB,, | Performed by: GENERAL ACUTE CARE HOSPITAL

## 2021-11-01 PROCEDURE — 3008F BODY MASS INDEX DOCD: CPT | Mod: CPTII,S$GLB,, | Performed by: GENERAL ACUTE CARE HOSPITAL

## 2021-11-01 PROCEDURE — 1160F RVW MEDS BY RX/DR IN RCRD: CPT | Mod: CPTII,S$GLB,, | Performed by: GENERAL ACUTE CARE HOSPITAL

## 2021-11-01 PROCEDURE — 4010F PR ACE/ARB THEARPY RXD/TAKEN: ICD-10-PCS | Mod: CPTII,S$GLB,, | Performed by: GENERAL ACUTE CARE HOSPITAL

## 2021-11-01 PROCEDURE — 99499 RISK ADDL DX/OHS AUDIT: ICD-10-PCS | Mod: S$GLB,,, | Performed by: FAMILY MEDICINE

## 2021-11-01 PROCEDURE — 99214 OFFICE O/P EST MOD 30 MIN: CPT | Mod: S$GLB,,, | Performed by: GENERAL ACUTE CARE HOSPITAL

## 2021-11-01 PROCEDURE — 1159F PR MEDICATION LIST DOCUMENTED IN MEDICAL RECORD: ICD-10-PCS | Mod: CPTII,S$GLB,, | Performed by: GENERAL ACUTE CARE HOSPITAL

## 2021-11-01 PROCEDURE — 4010F ACE/ARB THERAPY RXD/TAKEN: CPT | Mod: CPTII,S$GLB,, | Performed by: GENERAL ACUTE CARE HOSPITAL

## 2021-11-01 PROCEDURE — 99499 RISK ADDL DX/OHS AUDIT: ICD-10-PCS | Mod: S$GLB,,, | Performed by: GENERAL ACUTE CARE HOSPITAL

## 2021-11-01 PROCEDURE — 3078F DIAST BP <80 MM HG: CPT | Mod: CPTII,S$GLB,, | Performed by: FAMILY MEDICINE

## 2021-11-01 PROCEDURE — 99214 OFFICE O/P EST MOD 30 MIN: CPT | Mod: S$GLB,,, | Performed by: FAMILY MEDICINE

## 2021-11-01 PROCEDURE — 3046F HEMOGLOBIN A1C LEVEL >9.0%: CPT | Mod: CPTII,S$GLB,, | Performed by: GENERAL ACUTE CARE HOSPITAL

## 2021-11-01 RX ORDER — INSULIN GLARGINE 300 U/ML
40 INJECTION, SOLUTION SUBCUTANEOUS DAILY
Qty: 2 PEN | Refills: 11 | Status: SHIPPED | OUTPATIENT
Start: 2021-11-01 | End: 2022-01-24 | Stop reason: SDUPTHER

## 2021-11-01 RX ORDER — IBUPROFEN 200 MG
16 TABLET ORAL
Refills: 12 | COMMUNITY
Start: 2021-11-01 | End: 2022-11-01

## 2021-11-02 ENCOUNTER — HOSPITAL ENCOUNTER (OUTPATIENT)
Dept: WOUND CARE | Facility: HOSPITAL | Age: 66
Discharge: HOME | End: 2021-11-02
Attending: PODIATRIST
Payer: MEDICARE

## 2021-11-02 VITALS
DIASTOLIC BLOOD PRESSURE: 69 MMHG | HEIGHT: 76 IN | HEART RATE: 113 BPM | SYSTOLIC BLOOD PRESSURE: 134 MMHG | BODY MASS INDEX: 32.39 KG/M2 | WEIGHT: 266 LBS | TEMPERATURE: 98 F

## 2021-11-02 DIAGNOSIS — E11.42 TYPE 2 DIABETES MELLITUS WITH PERIPHERAL NEUROPATHY: ICD-10-CM

## 2021-11-02 DIAGNOSIS — S91.332D PENETRATING WOUND OF LEFT FOOT, SUBSEQUENT ENCOUNTER: Primary | ICD-10-CM

## 2021-11-02 PROCEDURE — 99203 OFFICE O/P NEW LOW 30 MIN: CPT

## 2021-11-02 PROCEDURE — 27201912 HC WOUND CARE DEBRIDEMENT SUPPLIES

## 2021-11-02 PROCEDURE — 99213 PR OFFICE/OUTPT VISIT, EST, LEVL III, 20-29 MIN: ICD-10-PCS | Mod: 25,,, | Performed by: PODIATRIST

## 2021-11-02 PROCEDURE — 99213 OFFICE O/P EST LOW 20 MIN: CPT | Mod: 25,,, | Performed by: PODIATRIST

## 2021-11-02 PROCEDURE — 11042 WOUND DEBRIDEMENT: ICD-10-PCS | Mod: ,,, | Performed by: PODIATRIST

## 2021-11-02 PROCEDURE — 11042 DBRDMT SUBQ TIS 1ST 20SQCM/<: CPT | Mod: ,,, | Performed by: PODIATRIST

## 2021-11-02 PROCEDURE — 11042 DBRDMT SUBQ TIS 1ST 20SQCM/<: CPT

## 2021-11-08 ENCOUNTER — OFFICE VISIT (OUTPATIENT)
Dept: TRANSPLANT | Facility: CLINIC | Age: 66
End: 2021-11-08
Payer: MEDICARE

## 2021-11-08 VITALS
TEMPERATURE: 97 F | HEIGHT: 73 IN | SYSTOLIC BLOOD PRESSURE: 115 MMHG | RESPIRATION RATE: 20 BRPM | DIASTOLIC BLOOD PRESSURE: 63 MMHG | HEART RATE: 103 BPM | WEIGHT: 267 LBS | OXYGEN SATURATION: 96 % | BODY MASS INDEX: 35.39 KG/M2

## 2021-11-08 DIAGNOSIS — Z94.4 S/P LIVER TRANSPLANT: ICD-10-CM

## 2021-11-08 PROCEDURE — 4010F ACE/ARB THERAPY RXD/TAKEN: CPT | Mod: CPTII,S$GLB,, | Performed by: INTERNAL MEDICINE

## 2021-11-08 PROCEDURE — 3046F PR MOST RECENT HEMOGLOBIN A1C LEVEL > 9.0%: ICD-10-PCS | Mod: CPTII,S$GLB,, | Performed by: INTERNAL MEDICINE

## 2021-11-08 PROCEDURE — 3078F DIAST BP <80 MM HG: CPT | Mod: CPTII,S$GLB,, | Performed by: INTERNAL MEDICINE

## 2021-11-08 PROCEDURE — 3074F PR MOST RECENT SYSTOLIC BLOOD PRESSURE < 130 MM HG: ICD-10-PCS | Mod: CPTII,S$GLB,, | Performed by: INTERNAL MEDICINE

## 2021-11-08 PROCEDURE — 3046F HEMOGLOBIN A1C LEVEL >9.0%: CPT | Mod: CPTII,S$GLB,, | Performed by: INTERNAL MEDICINE

## 2021-11-08 PROCEDURE — 99499 UNLISTED E&M SERVICE: CPT | Mod: S$GLB,,, | Performed by: INTERNAL MEDICINE

## 2021-11-08 PROCEDURE — 1111F PR DISCHARGE MEDS RECONCILED W/ CURRENT OUTPATIENT MED LIST: ICD-10-PCS | Mod: CPTII,S$GLB,, | Performed by: INTERNAL MEDICINE

## 2021-11-08 PROCEDURE — 3072F PR LOW RISK FOR RETINOPATHY: ICD-10-PCS | Mod: CPTII,S$GLB,, | Performed by: INTERNAL MEDICINE

## 2021-11-08 PROCEDURE — 3074F SYST BP LT 130 MM HG: CPT | Mod: CPTII,S$GLB,, | Performed by: INTERNAL MEDICINE

## 2021-11-08 PROCEDURE — 99499 RISK ADDL DX/OHS AUDIT: ICD-10-PCS | Mod: S$GLB,,, | Performed by: INTERNAL MEDICINE

## 2021-11-08 PROCEDURE — 3008F BODY MASS INDEX DOCD: CPT | Mod: CPTII,S$GLB,, | Performed by: INTERNAL MEDICINE

## 2021-11-08 PROCEDURE — 1159F MED LIST DOCD IN RCRD: CPT | Mod: CPTII,S$GLB,, | Performed by: INTERNAL MEDICINE

## 2021-11-08 PROCEDURE — 99214 OFFICE O/P EST MOD 30 MIN: CPT | Mod: S$GLB,,, | Performed by: INTERNAL MEDICINE

## 2021-11-08 PROCEDURE — 99999 PR PBB SHADOW E&M-EST. PATIENT-LVL V: CPT | Mod: PBBFAC,,, | Performed by: INTERNAL MEDICINE

## 2021-11-08 PROCEDURE — 1111F DSCHRG MED/CURRENT MED MERGE: CPT | Mod: CPTII,S$GLB,, | Performed by: INTERNAL MEDICINE

## 2021-11-08 PROCEDURE — 1159F PR MEDICATION LIST DOCUMENTED IN MEDICAL RECORD: ICD-10-PCS | Mod: CPTII,S$GLB,, | Performed by: INTERNAL MEDICINE

## 2021-11-08 PROCEDURE — 3072F LOW RISK FOR RETINOPATHY: CPT | Mod: CPTII,S$GLB,, | Performed by: INTERNAL MEDICINE

## 2021-11-08 PROCEDURE — 3078F PR MOST RECENT DIASTOLIC BLOOD PRESSURE < 80 MM HG: ICD-10-PCS | Mod: CPTII,S$GLB,, | Performed by: INTERNAL MEDICINE

## 2021-11-08 PROCEDURE — 99999 PR PBB SHADOW E&M-EST. PATIENT-LVL V: ICD-10-PCS | Mod: PBBFAC,,, | Performed by: INTERNAL MEDICINE

## 2021-11-08 PROCEDURE — 3008F PR BODY MASS INDEX (BMI) DOCUMENTED: ICD-10-PCS | Mod: CPTII,S$GLB,, | Performed by: INTERNAL MEDICINE

## 2021-11-08 PROCEDURE — 4010F PR ACE/ARB THEARPY RXD/TAKEN: ICD-10-PCS | Mod: CPTII,S$GLB,, | Performed by: INTERNAL MEDICINE

## 2021-11-08 PROCEDURE — 99214 PR OFFICE/OUTPT VISIT, EST, LEVL IV, 30-39 MIN: ICD-10-PCS | Mod: S$GLB,,, | Performed by: INTERNAL MEDICINE

## 2021-11-09 ENCOUNTER — OFFICE VISIT (OUTPATIENT)
Dept: CARDIOLOGY | Facility: CLINIC | Age: 66
End: 2021-11-09
Payer: MEDICARE

## 2021-11-09 ENCOUNTER — OFFICE VISIT (OUTPATIENT)
Dept: ORTHOPEDICS | Facility: CLINIC | Age: 66
End: 2021-11-09
Payer: MEDICARE

## 2021-11-09 VITALS — WEIGHT: 266 LBS | BODY MASS INDEX: 35.09 KG/M2

## 2021-11-09 VITALS
HEIGHT: 73 IN | HEART RATE: 107 BPM | DIASTOLIC BLOOD PRESSURE: 77 MMHG | WEIGHT: 264 LBS | OXYGEN SATURATION: 97 % | SYSTOLIC BLOOD PRESSURE: 125 MMHG | BODY MASS INDEX: 34.99 KG/M2

## 2021-11-09 DIAGNOSIS — I73.9 PAD (PERIPHERAL ARTERY DISEASE): Primary | ICD-10-CM

## 2021-11-09 DIAGNOSIS — M54.16 LUMBAR RADICULOPATHY: ICD-10-CM

## 2021-11-09 DIAGNOSIS — I99.8 ISCHEMIC LEG: ICD-10-CM

## 2021-11-09 DIAGNOSIS — G56.02 CARPAL TUNNEL SYNDROME OF LEFT WRIST: Primary | ICD-10-CM

## 2021-11-09 DIAGNOSIS — E11.65 UNCONTROLLED TYPE 2 DIABETES MELLITUS WITH HYPERGLYCEMIA: ICD-10-CM

## 2021-11-09 DIAGNOSIS — I73.9 CLAUDICATION IN PERIPHERAL VASCULAR DISEASE: ICD-10-CM

## 2021-11-09 DIAGNOSIS — E11.69 HYPERLIPIDEMIA ASSOCIATED WITH TYPE 2 DIABETES MELLITUS: ICD-10-CM

## 2021-11-09 DIAGNOSIS — E78.1 HYPERTRIGLYCERIDEMIA: ICD-10-CM

## 2021-11-09 DIAGNOSIS — E78.5 HYPERLIPIDEMIA ASSOCIATED WITH TYPE 2 DIABETES MELLITUS: ICD-10-CM

## 2021-11-09 DIAGNOSIS — Z94.4 S/P LIVER TRANSPLANT: ICD-10-CM

## 2021-11-09 DIAGNOSIS — Z86.19 HISTORY OF HEPATITIS C: ICD-10-CM

## 2021-11-09 DIAGNOSIS — E66.01 SEVERE OBESITY (BMI 35.0-39.9) WITH COMORBIDITY: ICD-10-CM

## 2021-11-09 DIAGNOSIS — I10 ESSENTIAL HYPERTENSION: ICD-10-CM

## 2021-11-09 DIAGNOSIS — I70.0 AORTIC ATHEROSCLEROSIS: ICD-10-CM

## 2021-11-09 DIAGNOSIS — K56.609 SBO (SMALL BOWEL OBSTRUCTION): ICD-10-CM

## 2021-11-09 PROCEDURE — 3072F PR LOW RISK FOR RETINOPATHY: ICD-10-PCS | Mod: CPTII,S$GLB,, | Performed by: ORTHOPAEDIC SURGERY

## 2021-11-09 PROCEDURE — 3046F HEMOGLOBIN A1C LEVEL >9.0%: CPT | Mod: CPTII,S$GLB,, | Performed by: ORTHOPAEDIC SURGERY

## 2021-11-09 PROCEDURE — 99214 PR OFFICE/OUTPT VISIT, EST, LEVL IV, 30-39 MIN: ICD-10-PCS | Mod: S$GLB,,, | Performed by: INTERNAL MEDICINE

## 2021-11-09 PROCEDURE — 4010F ACE/ARB THERAPY RXD/TAKEN: CPT | Mod: CPTII,S$GLB,, | Performed by: INTERNAL MEDICINE

## 2021-11-09 PROCEDURE — 4010F PR ACE/ARB THEARPY RXD/TAKEN: ICD-10-PCS | Mod: CPTII,S$GLB,, | Performed by: INTERNAL MEDICINE

## 2021-11-09 PROCEDURE — 3008F BODY MASS INDEX DOCD: CPT | Mod: CPTII,S$GLB,, | Performed by: ORTHOPAEDIC SURGERY

## 2021-11-09 PROCEDURE — 3008F PR BODY MASS INDEX (BMI) DOCUMENTED: ICD-10-PCS | Mod: CPTII,S$GLB,, | Performed by: ORTHOPAEDIC SURGERY

## 2021-11-09 PROCEDURE — 99499 RISK ADDL DX/OHS AUDIT: ICD-10-PCS | Mod: S$GLB,,, | Performed by: INTERNAL MEDICINE

## 2021-11-09 PROCEDURE — 3072F LOW RISK FOR RETINOPATHY: CPT | Mod: CPTII,S$GLB,, | Performed by: ORTHOPAEDIC SURGERY

## 2021-11-09 PROCEDURE — 1159F PR MEDICATION LIST DOCUMENTED IN MEDICAL RECORD: ICD-10-PCS | Mod: CPTII,S$GLB,, | Performed by: ORTHOPAEDIC SURGERY

## 2021-11-09 PROCEDURE — 4010F ACE/ARB THERAPY RXD/TAKEN: CPT | Mod: CPTII,S$GLB,, | Performed by: ORTHOPAEDIC SURGERY

## 2021-11-09 PROCEDURE — 99499 UNLISTED E&M SERVICE: CPT | Mod: S$GLB,,, | Performed by: INTERNAL MEDICINE

## 2021-11-09 PROCEDURE — 99999 PR PBB SHADOW E&M-EST. PATIENT-LVL III: CPT | Mod: PBBFAC,,, | Performed by: ORTHOPAEDIC SURGERY

## 2021-11-09 PROCEDURE — 99024 POSTOP FOLLOW-UP VISIT: CPT | Mod: S$GLB,,, | Performed by: ORTHOPAEDIC SURGERY

## 2021-11-09 PROCEDURE — 99214 OFFICE O/P EST MOD 30 MIN: CPT | Mod: S$GLB,,, | Performed by: INTERNAL MEDICINE

## 2021-11-09 PROCEDURE — 3072F LOW RISK FOR RETINOPATHY: CPT | Mod: CPTII,S$GLB,, | Performed by: INTERNAL MEDICINE

## 2021-11-09 PROCEDURE — 3046F PR MOST RECENT HEMOGLOBIN A1C LEVEL > 9.0%: ICD-10-PCS | Mod: CPTII,S$GLB,, | Performed by: ORTHOPAEDIC SURGERY

## 2021-11-09 PROCEDURE — 99999 PR PBB SHADOW E&M-EST. PATIENT-LVL III: CPT | Mod: PBBFAC,,, | Performed by: INTERNAL MEDICINE

## 2021-11-09 PROCEDURE — 4010F PR ACE/ARB THEARPY RXD/TAKEN: ICD-10-PCS | Mod: CPTII,S$GLB,, | Performed by: ORTHOPAEDIC SURGERY

## 2021-11-09 PROCEDURE — 99999 PR PBB SHADOW E&M-EST. PATIENT-LVL III: ICD-10-PCS | Mod: PBBFAC,,, | Performed by: ORTHOPAEDIC SURGERY

## 2021-11-09 PROCEDURE — 1159F MED LIST DOCD IN RCRD: CPT | Mod: CPTII,S$GLB,, | Performed by: ORTHOPAEDIC SURGERY

## 2021-11-09 PROCEDURE — 99999 PR PBB SHADOW E&M-EST. PATIENT-LVL III: ICD-10-PCS | Mod: PBBFAC,,, | Performed by: INTERNAL MEDICINE

## 2021-11-09 PROCEDURE — 99024 PR POST-OP FOLLOW-UP VISIT: ICD-10-PCS | Mod: S$GLB,,, | Performed by: ORTHOPAEDIC SURGERY

## 2021-11-09 PROCEDURE — 3072F PR LOW RISK FOR RETINOPATHY: ICD-10-PCS | Mod: CPTII,S$GLB,, | Performed by: INTERNAL MEDICINE

## 2021-11-09 RX ORDER — HYDROCODONE BITARTRATE AND ACETAMINOPHEN 5; 325 MG/1; MG/1
1 TABLET ORAL EVERY 6 HOURS PRN
Qty: 20 TABLET | Refills: 0 | Status: SHIPPED | OUTPATIENT
Start: 2021-11-09 | End: 2021-11-19

## 2021-11-16 ENCOUNTER — HOSPITAL ENCOUNTER (OUTPATIENT)
Dept: WOUND CARE | Facility: HOSPITAL | Age: 66
Discharge: HOME | End: 2021-11-16
Attending: PODIATRIST
Payer: MEDICARE

## 2021-11-16 VITALS
WEIGHT: 264 LBS | HEIGHT: 73 IN | SYSTOLIC BLOOD PRESSURE: 109 MMHG | BODY MASS INDEX: 34.99 KG/M2 | DIASTOLIC BLOOD PRESSURE: 67 MMHG | HEART RATE: 103 BPM | TEMPERATURE: 97 F

## 2021-11-16 DIAGNOSIS — E11.42 TYPE 2 DIABETES MELLITUS WITH PERIPHERAL NEUROPATHY: ICD-10-CM

## 2021-11-16 DIAGNOSIS — S91.332D PENETRATING WOUND OF LEFT FOOT, SUBSEQUENT ENCOUNTER: Primary | ICD-10-CM

## 2021-11-16 PROCEDURE — 11042 WOUND DEBRIDEMENT: ICD-10-PCS | Mod: ,,, | Performed by: PODIATRIST

## 2021-11-16 PROCEDURE — 27201912 HC WOUND CARE DEBRIDEMENT SUPPLIES

## 2021-11-16 PROCEDURE — 11042 DBRDMT SUBQ TIS 1ST 20SQCM/<: CPT | Mod: ,,, | Performed by: PODIATRIST

## 2021-11-16 PROCEDURE — 11042 DBRDMT SUBQ TIS 1ST 20SQCM/<: CPT

## 2021-11-22 ENCOUNTER — TELEPHONE (OUTPATIENT)
Dept: ORTHOPEDICS | Facility: CLINIC | Age: 66
End: 2021-11-22
Payer: MEDICARE

## 2021-12-03 ENCOUNTER — OFFICE VISIT (OUTPATIENT)
Dept: ORTHOPEDICS | Facility: CLINIC | Age: 66
End: 2021-12-03
Payer: MEDICARE

## 2021-12-03 VITALS — WEIGHT: 264 LBS | BODY MASS INDEX: 34.83 KG/M2

## 2021-12-03 DIAGNOSIS — Z98.890 S/P DECOMPRESSION OF ULNAR NERVE AT ELBOW: ICD-10-CM

## 2021-12-03 DIAGNOSIS — Z98.890 S/P CARPAL TUNNEL RELEASE: Primary | ICD-10-CM

## 2021-12-03 PROCEDURE — 99999 PR PBB SHADOW E&M-EST. PATIENT-LVL II: ICD-10-PCS | Mod: PBBFAC,,, | Performed by: ORTHOPAEDIC SURGERY

## 2021-12-03 PROCEDURE — 99999 PR PBB SHADOW E&M-EST. PATIENT-LVL II: CPT | Mod: PBBFAC,,, | Performed by: ORTHOPAEDIC SURGERY

## 2021-12-03 PROCEDURE — 4010F PR ACE/ARB THEARPY RXD/TAKEN: ICD-10-PCS | Mod: CPTII,S$GLB,, | Performed by: ORTHOPAEDIC SURGERY

## 2021-12-03 PROCEDURE — 99024 POSTOP FOLLOW-UP VISIT: CPT | Mod: S$GLB,,, | Performed by: ORTHOPAEDIC SURGERY

## 2021-12-03 PROCEDURE — 99024 PR POST-OP FOLLOW-UP VISIT: ICD-10-PCS | Mod: S$GLB,,, | Performed by: ORTHOPAEDIC SURGERY

## 2021-12-03 PROCEDURE — 4010F ACE/ARB THERAPY RXD/TAKEN: CPT | Mod: CPTII,S$GLB,, | Performed by: ORTHOPAEDIC SURGERY

## 2021-12-03 PROCEDURE — 3072F LOW RISK FOR RETINOPATHY: CPT | Mod: CPTII,S$GLB,, | Performed by: ORTHOPAEDIC SURGERY

## 2021-12-03 PROCEDURE — 3072F PR LOW RISK FOR RETINOPATHY: ICD-10-PCS | Mod: CPTII,S$GLB,, | Performed by: ORTHOPAEDIC SURGERY

## 2021-12-06 ENCOUNTER — LAB VISIT (OUTPATIENT)
Dept: LAB | Facility: HOSPITAL | Age: 66
End: 2021-12-06
Attending: INTERNAL MEDICINE
Payer: MEDICARE

## 2021-12-06 ENCOUNTER — OFFICE VISIT (OUTPATIENT)
Dept: INFECTIOUS DISEASES | Facility: CLINIC | Age: 66
End: 2021-12-06
Payer: MEDICARE

## 2021-12-06 VITALS
WEIGHT: 271.94 LBS | HEART RATE: 79 BPM | DIASTOLIC BLOOD PRESSURE: 66 MMHG | BODY MASS INDEX: 34.9 KG/M2 | HEIGHT: 74 IN | SYSTOLIC BLOOD PRESSURE: 116 MMHG

## 2021-12-06 DIAGNOSIS — S91.332D PENETRATING WOUND OF LEFT FOOT, SUBSEQUENT ENCOUNTER: ICD-10-CM

## 2021-12-06 DIAGNOSIS — S91.332D PENETRATING WOUND OF LEFT FOOT, SUBSEQUENT ENCOUNTER: Primary | ICD-10-CM

## 2021-12-06 LAB
CRP SERPL-MCNC: 9.5 MG/L (ref 0–8.2)
ERYTHROCYTE [SEDIMENTATION RATE] IN BLOOD BY WESTERGREN METHOD: 35 MM/HR (ref 0–10)

## 2021-12-06 PROCEDURE — 99999 PR PBB SHADOW E&M-EST. PATIENT-LVL III: ICD-10-PCS | Mod: PBBFAC,,, | Performed by: INTERNAL MEDICINE

## 2021-12-06 PROCEDURE — 86140 C-REACTIVE PROTEIN: CPT | Performed by: INTERNAL MEDICINE

## 2021-12-06 PROCEDURE — 36415 COLL VENOUS BLD VENIPUNCTURE: CPT | Performed by: INTERNAL MEDICINE

## 2021-12-06 PROCEDURE — 99213 OFFICE O/P EST LOW 20 MIN: CPT | Mod: S$GLB,,, | Performed by: INTERNAL MEDICINE

## 2021-12-06 PROCEDURE — 99999 PR PBB SHADOW E&M-EST. PATIENT-LVL III: CPT | Mod: PBBFAC,,, | Performed by: INTERNAL MEDICINE

## 2021-12-06 PROCEDURE — 85652 RBC SED RATE AUTOMATED: CPT | Performed by: INTERNAL MEDICINE

## 2021-12-06 PROCEDURE — 99213 OFFICE O/P EST LOW 20 MIN: CPT | Mod: PBBFAC,PN | Performed by: INTERNAL MEDICINE

## 2021-12-06 PROCEDURE — 99213 PR OFFICE/OUTPT VISIT, EST, LEVL III, 20-29 MIN: ICD-10-PCS | Mod: S$GLB,,, | Performed by: INTERNAL MEDICINE

## 2021-12-07 ENCOUNTER — HOSPITAL ENCOUNTER (OUTPATIENT)
Dept: WOUND CARE | Facility: HOSPITAL | Age: 66
Discharge: HOME | End: 2021-12-07
Attending: PODIATRIST
Payer: MEDICARE

## 2021-12-07 ENCOUNTER — TELEPHONE (OUTPATIENT)
Dept: ORTHOPEDICS | Facility: CLINIC | Age: 66
End: 2021-12-07
Payer: MEDICARE

## 2021-12-07 VITALS
HEIGHT: 75 IN | DIASTOLIC BLOOD PRESSURE: 65 MMHG | SYSTOLIC BLOOD PRESSURE: 137 MMHG | WEIGHT: 271 LBS | TEMPERATURE: 97 F | HEART RATE: 83 BPM | BODY MASS INDEX: 33.69 KG/M2

## 2021-12-07 DIAGNOSIS — Z87.2 HEALED ULCER OF LEFT FOOT: ICD-10-CM

## 2021-12-07 DIAGNOSIS — E11.628 DIABETIC FOOT INFECTION: ICD-10-CM

## 2021-12-07 DIAGNOSIS — L08.9 DIABETIC FOOT INFECTION: ICD-10-CM

## 2021-12-07 DIAGNOSIS — E11.42 TYPE 2 DIABETES MELLITUS WITH PERIPHERAL NEUROPATHY: ICD-10-CM

## 2021-12-07 DIAGNOSIS — S91.332D PENETRATING WOUND OF LEFT FOOT, SUBSEQUENT ENCOUNTER: Primary | ICD-10-CM

## 2021-12-07 PROCEDURE — 99211 OFF/OP EST MAY X REQ PHY/QHP: CPT

## 2021-12-07 PROCEDURE — 99213 OFFICE O/P EST LOW 20 MIN: CPT | Mod: ,,, | Performed by: PODIATRIST

## 2021-12-07 PROCEDURE — 97597 DBRDMT OPN WND 1ST 20 CM/<: CPT

## 2021-12-07 PROCEDURE — 99213 PR OFFICE/OUTPT VISIT, EST, LEVL III, 20-29 MIN: ICD-10-PCS | Mod: ,,, | Performed by: PODIATRIST

## 2021-12-07 PROCEDURE — 27201912 HC WOUND CARE DEBRIDEMENT SUPPLIES

## 2021-12-07 PROCEDURE — 11042 DBRDMT SUBQ TIS 1ST 20SQCM/<: CPT

## 2021-12-09 ENCOUNTER — TELEPHONE (OUTPATIENT)
Dept: ORTHOPEDICS | Facility: CLINIC | Age: 66
End: 2021-12-09
Payer: MEDICARE

## 2021-12-09 RX ORDER — TRAMADOL HYDROCHLORIDE 50 MG/1
50 TABLET ORAL EVERY 6 HOURS PRN
Qty: 40 TABLET | Refills: 0 | Status: SHIPPED | OUTPATIENT
Start: 2021-12-09 | End: 2021-12-19

## 2021-12-09 RX ORDER — INSULIN LISPRO 100 [IU]/ML
20 INJECTION, SOLUTION INTRAVENOUS; SUBCUTANEOUS
Qty: 15 ML | Refills: 11 | Status: SHIPPED | OUTPATIENT
Start: 2021-12-09 | End: 2023-01-03 | Stop reason: SDUPTHER

## 2021-12-10 ENCOUNTER — TELEPHONE (OUTPATIENT)
Dept: FAMILY MEDICINE | Facility: CLINIC | Age: 66
End: 2021-12-10
Payer: MEDICARE

## 2021-12-24 ENCOUNTER — HOSPITAL ENCOUNTER (EMERGENCY)
Facility: HOSPITAL | Age: 66
Discharge: HOME | End: 2021-12-24
Attending: EMERGENCY MEDICINE
Payer: MEDICARE

## 2021-12-24 VITALS
HEART RATE: 73 BPM | DIASTOLIC BLOOD PRESSURE: 93 MMHG | SYSTOLIC BLOOD PRESSURE: 138 MMHG | BODY MASS INDEX: 33.75 KG/M2 | WEIGHT: 270 LBS | RESPIRATION RATE: 14 BRPM | TEMPERATURE: 98 F | OXYGEN SATURATION: 97 %
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
## 2021-12-24 DIAGNOSIS — S01.01XA LACERATION OF SCALP, INITIAL ENCOUNTER: Primary | ICD-10-CM

## 2021-12-24 DIAGNOSIS — S09.90XA CLOSED HEAD INJURY, INITIAL ENCOUNTER: ICD-10-CM

## 2021-12-24 PROCEDURE — 99285 EMERGENCY DEPT VISIT HI MDM: CPT | Mod: 25

## 2021-12-24 PROCEDURE — 12002 RPR S/N/AX/GEN/TRNK2.6-7.5CM: CPT

## 2021-12-24 PROCEDURE — 96374 THER/PROPH/DIAG INJ IV PUSH: CPT

## 2021-12-24 PROCEDURE — 63600175 PHARM REV CODE 636 W HCPCS: Performed by: EMERGENCY MEDICINE

## 2021-12-24 PROCEDURE — 25000003 PHARM REV CODE 250: Performed by: EMERGENCY MEDICINE

## 2021-12-24 RX ORDER — HYDROCODONE BITARTRATE AND ACETAMINOPHEN 5; 325 MG/1; MG/1
1 TABLET ORAL EVERY 8 HOURS PRN
Qty: 9 TABLET | Refills: 0 | Status: SHIPPED | OUTPATIENT
Start: 2021-12-24 | End: 2021-12-24 | Stop reason: SDUPTHER

## 2021-12-24 RX ORDER — FENTANYL CITRATE 50 UG/ML
50 INJECTION, SOLUTION INTRAMUSCULAR; INTRAVENOUS
Status: COMPLETED | OUTPATIENT
Start: 2021-12-24 | End: 2021-12-24

## 2021-12-24 RX ORDER — LIDOCAINE HYDROCHLORIDE 10 MG/ML
10 INJECTION INFILTRATION; PERINEURAL
Status: COMPLETED | OUTPATIENT
Start: 2021-12-24 | End: 2021-12-24

## 2021-12-24 RX ORDER — OXYCODONE HYDROCHLORIDE 5 MG/1
5 TABLET ORAL
Status: DISCONTINUED | OUTPATIENT
Start: 2021-12-24 | End: 2021-12-25 | Stop reason: HOSPADM

## 2021-12-24 RX ORDER — HYDROCODONE BITARTRATE AND ACETAMINOPHEN 5; 325 MG/1; MG/1
2 TABLET ORAL
Status: COMPLETED | OUTPATIENT
Start: 2021-12-24 | End: 2021-12-24

## 2021-12-24 RX ORDER — CEPHALEXIN 250 MG/1
500 CAPSULE ORAL EVERY 6 HOURS
Qty: 40 CAPSULE | Refills: 0 | Status: SHIPPED | OUTPATIENT
Start: 2021-12-24 | End: 2021-12-29

## 2021-12-24 RX ORDER — HYDROCODONE BITARTRATE AND ACETAMINOPHEN 5; 325 MG/1; MG/1
1 TABLET ORAL EVERY 8 HOURS PRN
Qty: 9 TABLET | Refills: 0 | Status: SHIPPED | OUTPATIENT
Start: 2021-12-24 | End: 2021-12-27

## 2021-12-24 RX ORDER — LIDOCAINE HYDROCHLORIDE 10 MG/ML
10 INJECTION INFILTRATION; PERINEURAL
Status: DISCONTINUED | OUTPATIENT
Start: 2021-12-24 | End: 2021-12-25 | Stop reason: HOSPADM

## 2021-12-24 RX ADMIN — LIDOCAINE HYDROCHLORIDE 10 ML: 10 INJECTION, SOLUTION INFILTRATION; PERINEURAL at 03:12

## 2021-12-24 RX ADMIN — FENTANYL CITRATE 50 MCG: 50 INJECTION INTRAMUSCULAR; INTRAVENOUS at 06:12

## 2021-12-24 RX ADMIN — HYDROCODONE BITARTRATE AND ACETAMINOPHEN 2 TABLET: 5; 325 TABLET ORAL at 08:12

## 2021-12-31 ENCOUNTER — HOSPITAL ENCOUNTER (EMERGENCY)
Facility: HOSPITAL | Age: 66
Discharge: HOME | End: 2021-12-31
Attending: EMERGENCY MEDICINE
Payer: MEDICARE

## 2021-12-31 VITALS
OXYGEN SATURATION: 100 % | WEIGHT: 265 LBS | TEMPERATURE: 99 F | DIASTOLIC BLOOD PRESSURE: 88 MMHG | SYSTOLIC BLOOD PRESSURE: 112 MMHG | HEART RATE: 88 BPM | RESPIRATION RATE: 18 BRPM | HEIGHT: 75 IN | BODY MASS INDEX: 32.95 KG/M2

## 2021-12-31 DIAGNOSIS — Z48.02 ENCOUNTER FOR STAPLE REMOVAL: Primary | ICD-10-CM

## 2021-12-31 PROCEDURE — 99282 EMERGENCY DEPT VISIT SF MDM: CPT

## 2021-12-31 NOTE — ED PROVIDER NOTES
Encounter Date: 12/31/2021       History     Chief Complaint   Patient presents with    Suture / Staple Removal     Pt had 11 vidal 12  staples placed on 12/24, request to have removed      66-year-old male presents to ED requesting staple removal.  Staples applied on 12/24 after laceration to head caused by fall.  He reports laceration site continues to heal well.  No drainage.  No other acute complaints at this time.    The history is provided by the patient.     Review of patient's allergies indicates:  No Known Allergies  Past Medical History:   Diagnosis Date    Anemia     Anxiety     Chronic pain syndrome 7/13/2011    CKD (chronic kidney disease), stage III     Diabetes mellitus type II, uncontrolled     Discitis of lumbosacral region 1/16/2015    ED (erectile dysfunction)     Encounter for blood transfusion     Genital herpes     Gout, arthritis     History of alcohol abuse     History of hepatitis C, s/p successful Rx w/ SVR24 (cure) - 5/2018 8/23/2011    Completed 24wks Epclusa + RBV w/SVR12 - 2/2018  -     History of positive PPD, treatment status unknown     Pulmonary granulomas, negative sputum cultures for AFB and indeterminate quantferon test    History of substance abuse     Hypertension     Hypothyroidism     Liver replaced by transplant 8/23/2011    DATE: 12/16/2013  LIVER BIOPSY : REASON:  hep C staging  PATHOLOGY COMMITTEE NOTE/PLAN: :grade  1 / stage 1        Pancreatitis 2016    Peptic ulcer disease      Past Surgical History:   Procedure Laterality Date    CARPAL TUNNEL RELEASE Left 10/29/2021    Procedure: RELEASE, CARPAL TUNNEL;  Surgeon: Jameson Alejo Jr., MD;  Location: [facility] OR;  Service: Orthopedics;  Laterality: Left;    CHOLECYSTECTOMY      INJECTION OF JOINT Right 12/2/2019    Procedure: INJECTION, JOINT SI;  Surgeon: Thu Penn MD;  Location: [facility];  Service: Pain Management;  Laterality: Right;  RT SI JNT INJ    LIVER TRANSPLANT  06/2010    SPINE  SURGERY       Family History   Problem Relation Age of Onset    Cancer Mother     Diabetes Mother     Heart disease Mother     Diabetes Sister     Cancer Maternal Uncle 82        colon CA    Drug abuse Daughter     Melanoma Neg Hx     Psoriasis Neg Hx     Lupus Neg Hx     Eczema Neg Hx     Acne Neg Hx      Social History     Tobacco Use    Smoking status: Former Smoker    Smokeless tobacco: Never Used   Substance Use Topics    Alcohol use: No     Comment: over 5 years ago, none currently    Drug use: Not Currently     Comment: Former cocaine use     Review of Systems   Skin: Positive for wound.       Physical Exam     Initial Vitals [12/31/21 1441]   BP Pulse Resp Temp SpO2   114/81 85 17 98.9 °F (37.2 °C) 99 %      MAP       --         Physical Exam    Constitutional: Vital signs are normal. He appears well-developed and well-nourished. He is cooperative. He does not have a sickly appearance. He does not appear ill. No distress.   HENT:   Head: Normocephalic and atraumatic.   Eyes: EOM are normal.   Neck:   Normal range of motion.  Musculoskeletal:      Cervical back: Normal range of motion.     Neurological: He is alert and oriented to person, place, and time. GCS eye subscore is 4. GCS verbal subscore is 5. GCS motor subscore is 6.   Skin:   Laceration to scalp well healed.  Scabbing noted over staples.  8 stable visible.   Psychiatric: He has a normal mood and affect. His speech is normal and behavior is normal.         ED Course   Suture Removal    Date/Time: 12/31/2021 5:29 PM  Location procedure was performed: [facility] EMERGENCY DEPARTMENT  Performed by: Paco Heck PA-C  Authorized by: Vinnie Lowery MD   Body area: head/neck  Location details: scalp  Wound Appearance: well healed and no drainage  Staples Removed: 8  Post-removal: no dressing applied  Facility: sutures placed in this facility  Patient tolerance: Patient tolerated the procedure well with no immediate  complications        Labs Reviewed - No data to display       Imaging Results    None          Medications - No data to display  Medical Decision Making:   Initial Assessment:   Patient presents requesting staple removal after stable applied to scalp laceration on 12/24.  Denies any acute complications from laceration site.  Eight visible staples noted  Differential Diagnosis:   Stable move  ED Management:  Staples removed without complication.  Per chart, 11 staples were applied.  Only 8 staples visible on today's exam.  Patient was educated some staples may have fallen out over past week, but he was encouraged to wash scalp thoroughly and follow-up with PCP over next 1-2 days for re-evaluation and ensure no staples remain.  ED return precautions discussed.  Patient states his understanding and agrees with plan.                      Clinical Impression:   Final diagnoses:  [Z48.02] Encounter for staple removal (Primary)          ED Disposition Condition    Discharge Stable        ED Prescriptions     None        Follow-up Information    None          Paco Heck PA-C  12/31/21 6553

## 2021-12-31 NOTE — DISCHARGE INSTRUCTIONS

## 2022-01-06 NOTE — TELEPHONE ENCOUNTER
----- Message from Cecilia Gary sent at 9/26/2017  1:48 PM CDT -----  Contact: [phone]/ self   Patient received a call to confirm hin appt tomorrow morning with Dr Hanna.  Patient actually has a procedure scheduled with Dr Mahajan in the morning so he won't be able to make this appt.   
noted  
[Time Spent: ___ minutes] : I have spent [unfilled] minutes of time on the encounter.

## 2022-01-18 ENCOUNTER — HOSPITAL ENCOUNTER (OUTPATIENT)
Dept: RADIOLOGY | Facility: HOSPITAL | Age: 67
Discharge: HOME | End: 2022-01-18
Attending: FAMILY MEDICINE
Payer: MEDICARE

## 2022-01-18 ENCOUNTER — TELEPHONE (OUTPATIENT)
Dept: FAMILY MEDICINE | Facility: CLINIC | Age: 67
End: 2022-01-18

## 2022-01-18 ENCOUNTER — TELEPHONE (OUTPATIENT)
Dept: OPTOMETRY | Facility: CLINIC | Age: 67
End: 2022-01-18
Payer: MEDICARE

## 2022-01-18 ENCOUNTER — OFFICE VISIT (OUTPATIENT)
Dept: FAMILY MEDICINE | Facility: CLINIC | Age: 67
End: 2022-01-18
Payer: MEDICARE

## 2022-01-18 VITALS
BODY MASS INDEX: 32.95 KG/M2 | DIASTOLIC BLOOD PRESSURE: 82 MMHG | HEIGHT: 75 IN | WEIGHT: 265 LBS | OXYGEN SATURATION: 98 % | HEART RATE: 90 BPM | TEMPERATURE: 98 F | SYSTOLIC BLOOD PRESSURE: 154 MMHG

## 2022-01-18 DIAGNOSIS — M79.89 LEFT ARM SWELLING: ICD-10-CM

## 2022-01-18 DIAGNOSIS — M79.642 LEFT HAND PAIN: ICD-10-CM

## 2022-01-18 DIAGNOSIS — D69.6 THROMBOCYTOPENIA: ICD-10-CM

## 2022-01-18 DIAGNOSIS — E11.69 HYPERLIPIDEMIA ASSOCIATED WITH TYPE 2 DIABETES MELLITUS: ICD-10-CM

## 2022-01-18 DIAGNOSIS — Z94.4 S/P LIVER TRANSPLANT: ICD-10-CM

## 2022-01-18 DIAGNOSIS — I10 HYPERTENSION, UNSPECIFIED TYPE: ICD-10-CM

## 2022-01-18 DIAGNOSIS — E11.65 UNCONTROLLED TYPE 2 DIABETES MELLITUS WITH HYPERGLYCEMIA: Primary | ICD-10-CM

## 2022-01-18 DIAGNOSIS — S01.01XA LACERATION OF SCALP WITHOUT FOREIGN BODY, INITIAL ENCOUNTER: ICD-10-CM

## 2022-01-18 DIAGNOSIS — Z48.02 ENCOUNTER FOR STAPLE REMOVAL: ICD-10-CM

## 2022-01-18 DIAGNOSIS — I70.0 AORTIC ATHEROSCLEROSIS: ICD-10-CM

## 2022-01-18 DIAGNOSIS — E11.42 DIABETIC POLYNEUROPATHY ASSOCIATED WITH TYPE 2 DIABETES MELLITUS: ICD-10-CM

## 2022-01-18 DIAGNOSIS — D84.9 IMMUNOSUPPRESSION: ICD-10-CM

## 2022-01-18 DIAGNOSIS — E78.5 HYPERLIPIDEMIA ASSOCIATED WITH TYPE 2 DIABETES MELLITUS: ICD-10-CM

## 2022-01-18 PROCEDURE — 99214 OFFICE O/P EST MOD 30 MIN: CPT | Mod: S$GLB,,, | Performed by: FAMILY MEDICINE

## 2022-01-18 PROCEDURE — 1159F PR MEDICATION LIST DOCUMENTED IN MEDICAL RECORD: ICD-10-PCS | Mod: CPTII,S$GLB,, | Performed by: FAMILY MEDICINE

## 2022-01-18 PROCEDURE — 99999 PR PBB SHADOW E&M-EST. PATIENT-LVL V: ICD-10-PCS | Mod: PBBFAC,,, | Performed by: FAMILY MEDICINE

## 2022-01-18 PROCEDURE — 3077F SYST BP >= 140 MM HG: CPT | Mod: CPTII,S$GLB,, | Performed by: FAMILY MEDICINE

## 2022-01-18 PROCEDURE — 3079F DIAST BP 80-89 MM HG: CPT | Mod: CPTII,S$GLB,, | Performed by: FAMILY MEDICINE

## 2022-01-18 PROCEDURE — 99999 PR PBB SHADOW E&M-EST. PATIENT-LVL V: CPT | Mod: PBBFAC,,, | Performed by: FAMILY MEDICINE

## 2022-01-18 PROCEDURE — 1159F MED LIST DOCD IN RCRD: CPT | Mod: CPTII,S$GLB,, | Performed by: FAMILY MEDICINE

## 2022-01-18 PROCEDURE — 73060 XR HUMERUS 2 VIEW LEFT: ICD-10-PCS | Mod: 26,LT,, | Performed by: RADIOLOGY

## 2022-01-18 PROCEDURE — 3079F PR MOST RECENT DIASTOLIC BLOOD PRESSURE 80-89 MM HG: ICD-10-PCS | Mod: CPTII,S$GLB,, | Performed by: FAMILY MEDICINE

## 2022-01-18 PROCEDURE — 99215 OFFICE O/P EST HI 40 MIN: CPT | Mod: PBBFAC,PO | Performed by: FAMILY MEDICINE

## 2022-01-18 PROCEDURE — 73060 X-RAY EXAM OF HUMERUS: CPT | Mod: TC,FY,LT

## 2022-01-18 PROCEDURE — 3008F BODY MASS INDEX DOCD: CPT | Mod: CPTII,S$GLB,, | Performed by: FAMILY MEDICINE

## 2022-01-18 PROCEDURE — 3077F PR MOST RECENT SYSTOLIC BLOOD PRESSURE >= 140 MM HG: ICD-10-PCS | Mod: CPTII,S$GLB,, | Performed by: FAMILY MEDICINE

## 2022-01-18 PROCEDURE — 99214 PR OFFICE/OUTPT VISIT, EST, LEVL IV, 30-39 MIN: ICD-10-PCS | Mod: S$GLB,,, | Performed by: FAMILY MEDICINE

## 2022-01-18 PROCEDURE — 73060 X-RAY EXAM OF HUMERUS: CPT | Mod: 26,LT,, | Performed by: RADIOLOGY

## 2022-01-18 PROCEDURE — 3008F PR BODY MASS INDEX (BMI) DOCUMENTED: ICD-10-PCS | Mod: CPTII,S$GLB,, | Performed by: FAMILY MEDICINE

## 2022-01-18 NOTE — TELEPHONE ENCOUNTER
----- Message from Ashlyn Kevin sent at 1/18/2022  3:47 PM CST -----  Can you please assist in scheduling with Dr. Brooks, thank you

## 2022-01-18 NOTE — PROGRESS NOTES
Transitional Care Note  Subjective:        (Portions of this note were dictated using voice recognition software and may contain dictation related errors in spelling/grammar/syntax not found on text review)    CC: hosp f/u      HPI: 65 y.o. male        Medical history as below  Diabetes with peripheral neuropathy: admits to dietary noncompliance now followed by endocrinology, was on Trulicity but not currently taking.  His current regimen of diabetes was adjusted to glargine 40 units, NovoLog to 15 units t.i.d. with meals.  Was believe that a lot of his hyperglycemia issues were relating to dietary and insulin noncompliance.  Follow-up with endocrinology appointment is coming up.     Hypertension on lisinopril 40 mg daily, metoprolol 200 mg daily, nifedipine 60 mg daily.  BP high but he did not take his medication     Dyslipidemia on Crestor 5mg daily.     Hypothyroidism:  Synthroid 88mcg.       Anxiety on Zoloft 100 mg daily.       Liver Transplant secondary to end-stage liver disease hepatitis C and prior alcohol abuse.:  Followed by hepatology.  On Prograf.   fibroscan showed stage II fibrosis     Chronic pain, followed by pain management.  Was On oxycodone along with his gabapentin.  However failed drug test with positive cocaine and Soma.  History of cocaine abuse,   Currently off of opiates for pain control, pain management had recommended continuing with gabapentin and interventional management if desired.    Had left carpal release surgery and left cubital tunnel release on 10/29/2021 with Dr. Alejo.  Had not made his follow-up appointment because of the injury below.  Hand still feels really tight.  Had not done any OT.  Would like repeat referral to his orthopedist    Fell off a ladder end of December, went to ED.  Had scalp laceration which was stapled.  On subsequent follow-up visit not all the staples could be visualized and removed, 8/11 were removed.  Was advised to follow-up for further assessment  after cleaning the scalp thoroughly.  Had had neck CT.  No fracture noted.  However, he states that he fell over on his left arm and has some distal swelling to the upper arm just proximal to the elbow.  It was hurting initially, not so much pain right now.  Not putting anything on it        Past Medical History:   Diagnosis Date    Anemia     Anxiety     Chronic pain syndrome 7/13/2011    CKD (chronic kidney disease), stage III     Diabetes mellitus type II, uncontrolled     Discitis of lumbosacral region 1/16/2015    ED (erectile dysfunction)     Encounter for blood transfusion     Genital herpes     Gout, arthritis     History of alcohol abuse     History of hepatitis C, s/p successful Rx w/ SVR24 (cure) - 5/2018 8/23/2011    Completed 24wks Epclusa + RBV w/SVR12 - 2/2018  -     History of positive PPD, treatment status unknown     Pulmonary granulomas, negative sputum cultures for AFB and indeterminate quantferon test    History of substance abuse     Hypertension     Hypothyroidism     Liver replaced by transplant 8/23/2011    DATE: 12/16/2013  LIVER BIOPSY : REASON:  hep C staging  PATHOLOGY COMMITTEE NOTE/PLAN: :grade  1 / stage 1        Pancreatitis 2016    Peptic ulcer disease        Past Surgical History:   Procedure Laterality Date    CARPAL TUNNEL RELEASE Left 10/29/2021    Procedure: RELEASE, CARPAL TUNNEL;  Surgeon: Jameson Alejo Jr., MD;  Location: [facility] OR;  Service: Orthopedics;  Laterality: Left;    CHOLECYSTECTOMY      INJECTION OF JOINT Right 12/2/2019    Procedure: INJECTION, JOINT SI;  Surgeon: Thu Penn MD;  Location: [facility] PAIN MGT;  Service: Pain Management;  Laterality: Right;  RT SI JNT INJ    LIVER TRANSPLANT  06/2010    SPINE SURGERY         Family History   Problem Relation Age of Onset    Cancer Mother     Diabetes Mother     Heart disease Mother     Diabetes Sister     Cancer Maternal Uncle 82        colon CA    Drug abuse Daughter     Melanoma Neg  Hx     Psoriasis Neg Hx     Lupus Neg Hx     Eczema Neg Hx     Acne Neg Hx        Social History     Tobacco Use    Smoking status: Former Smoker    Smokeless tobacco: Never Used   Substance Use Topics    Alcohol use: No     Comment: over 5 years ago, none currently    Drug use: Not Currently     Comment: Former cocaine use     Lab Results   Component Value Date    WBC 8.83 10/16/2021    HGB 12.6 (L) 10/16/2021    HCT 38.0 (L) 10/16/2021    MCV 88 10/16/2021     (L) 10/16/2021    CHOL 125 10/11/2021    TRIG 397 (H) 10/11/2021    HDL 27 (L) 10/11/2021    ALT 36 10/16/2021    AST 33 10/16/2021    BILITOT 0.2 10/16/2021    ALKPHOS 82 10/16/2021     10/29/2021    K 4.0 10/29/2021     10/29/2021    CREATININE 1.4 10/29/2021    ESTGFRAFRICA >60 10/29/2021    EGFRNONAA 53 (A) 10/29/2021    CALCIUM 9.6 10/29/2021    ALBUMIN 3.4 (L) 10/16/2021    BUN 29 (H) 10/29/2021    CO2 23 10/29/2021    TSH 1.046 11/11/2020    PSA 0.18 09/01/2020    PSADIAG 0.28 10/09/2017    INR 1.0 07/10/2019    HGBA1C 10.1 (H) 10/11/2021    MICALBCREAT 186.1 (H) 03/27/2017    LDLCALC 18.6 (L) 10/11/2021     (H) 10/29/2021    EJDLAKMP97AH 30 10/11/2021         Hemoglobin (g/dL)   Date Value   10/16/2021 12.6 (L)   10/15/2021 12.2 (L)   10/14/2021 11.6 (L)   10/13/2021 11.9 (L)   10/12/2021 11.0 (L)   10/11/2021 12.1 (L)   07/26/2021 12.7 (L)   04/26/2021 12.3 (L)   02/23/2021 13.2 (L)   01/26/2021 12.8 (L)     Hemoglobin A1C (%)   Date Value   10/11/2021 10.1 (H)   11/11/2020 9.1 (H)   09/01/2020 11.9 (H)   01/23/2020 8.7 (H)   10/04/2019 7.4 (H)   06/03/2019 9.2 (H)   02/25/2019 9.9 (H)   03/28/2018 8.3 (H)   10/02/2017 7.2 (H)   02/21/2017 9.0 (H)     Creatinine (mg/dL)   Date Value   10/29/2021 1.4   10/16/2021 1.2   10/15/2021 1.3   10/14/2021 1.2   10/13/2021 1.3   10/12/2021 1.4   10/11/2021 1.6 (H)   07/26/2021 1.6 (H)   04/26/2021 1.3   02/23/2021 2.0 (H)     eGFR if African American (mL/min/1.73 m^2)   Date  Value   10/29/2021 >60   10/16/2021 >60   10/15/2021 >60   10/14/2021 >60   10/13/2021 >60   10/12/2021 >60   10/11/2021 52 (A)   07/26/2021 52.2 (A)   04/26/2021 >60.0   02/23/2021 40 (A)     Platelets (K/uL)   Date Value   10/16/2021 116 (L)   10/15/2021 123 (L)   10/14/2021 111 (L)   10/13/2021 118 (L)   10/12/2021 109 (L)   10/11/2021 134 (L)   07/26/2021 139 (L)   04/26/2021 144 (L)   02/23/2021 92 (L)   01/26/2021 148 (L)         Vital signs reviewed  PE:   APPEARANCE: Well nourished, well developed, in no acute distress.    HEAD:  3 staples still noted upper posterior scalp  NECK: Supple with no cervical lymphadenopathy.    CHEST: Good inspiratory effort. Lungs clear to auscultation with no wheezes or crackles.  CARDIOVASCULAR: Normal S1, S2. No rubs, murmurs, or gallops.  ABDOMEN: Bowel sounds normal. Soft. No tenderness   EXTREMITIES:  Left arm.  Does have distal posterior focus swelling about 6-7 cm subcutaneous, appears to be hematoma, nontender.  Does have some humeral tenderness to palpation however.      IMPRESSION  1. Uncontrolled type 2 diabetes mellitus with hyperglycemia    2. Diabetic polyneuropathy associated with type 2 diabetes mellitus    3. Aortic atherosclerosis    4. Hyperlipidemia associated with type 2 diabetes mellitus    5. Thrombocytopenia    6. Immunosuppression    7. S/P liver transplant    8. Left hand pain    9. Left arm swelling    10. Laceration of scalp without foreign body, initial encounter    11. Hypertension, unspecified type              PLAN  Hypertension uncontrolled but he did take his medication today, usually takes in the morning.  Recommend one-week follow-up by nurse call to assess blood pressure at home.  Does have a blood pressure cuff at home that he can check with.  Continue current medication    Diabetes:  Reviewed endocrinology notes.  His Toujeo was decreased to 40 units daily as he states that he was forgetting a lot of his b.i.d. dosing.  He is on NovoLog 15  units with meals with additional sliding scale as necessary.  Upcoming endocrinology visit coming up.  He had labs better scheduled initially looks like it was supposed be before the appointment but since the appointment got pushed up, labs are after the appointment.  Will work to see if he can get the labs reschedule to before his endocrinology appointment    Immunosuppression and history of liver transplant.  Continue with hepatology follow-up as directed.       Will reschedule orthopedics appointment    Referral back to Optometry for his yearly screening    All 3 remaining staples removed from scalp today    Suspect hematoma of distal upper arm although given some humeral tenderness to palpation, will x-ray to assess for bony injury.  Advised on conservative management of hematoma.         SCREENINGS   Colonoscopy 2015, return in 10 years   prostate testing 2020, add PSA to next set of labs noted in the chart     Immunizations   Pneumovax 2019  PCV 10/29/2018   Flu:  Up-to-date   COVID vaccine 03/03/2021, 03/26/2021 ([employer])    Hepatitis B series up-to-date   Tdap 2021

## 2022-01-18 NOTE — TELEPHONE ENCOUNTER
Please inform that his x-ray does not show fracture of the arm.  Swelling is likely from a blood collection or hematoma.  Can use a heating pad periodically to help resolve over the next few weeks.

## 2022-01-19 ENCOUNTER — LAB VISIT (OUTPATIENT)
Dept: LAB | Facility: HOSPITAL | Age: 67
End: 2022-01-19
Attending: INTERNAL MEDICINE
Payer: MEDICARE

## 2022-01-19 DIAGNOSIS — Z12.5 SCREENING FOR MALIGNANT NEOPLASM OF PROSTATE: ICD-10-CM

## 2022-01-19 DIAGNOSIS — Z85.05 HISTORY OF LIVER CANCER: ICD-10-CM

## 2022-01-19 DIAGNOSIS — Z94.4 LIVER REPLACED BY TRANSPLANT: ICD-10-CM

## 2022-01-19 LAB
AFP SERPL-MCNC: 4.1 NG/ML (ref 0–8.4)
ALBUMIN SERPL BCP-MCNC: 3.7 G/DL (ref 3.5–5.2)
ALP SERPL-CCNC: 82 U/L (ref 55–135)
ALT SERPL W/O P-5'-P-CCNC: 21 U/L (ref 10–44)
ANION GAP SERPL CALC-SCNC: 10 MMOL/L (ref 8–16)
AST SERPL-CCNC: 21 U/L (ref 10–40)
BASOPHILS # BLD AUTO: 0.04 K/UL (ref 0–0.2)
BASOPHILS NFR BLD: 0.5 % (ref 0–1.9)
BILIRUB SERPL-MCNC: 0.5 MG/DL (ref 0.1–1)
BUN SERPL-MCNC: 12 MG/DL (ref 8–23)
CALCIUM SERPL-MCNC: 9.4 MG/DL (ref 8.7–10.5)
CHLORIDE SERPL-SCNC: 106 MMOL/L (ref 95–110)
CO2 SERPL-SCNC: 22 MMOL/L (ref 23–29)
COMPLEXED PSA SERPL-MCNC: 0.22 NG/ML (ref 0–4)
CREAT SERPL-MCNC: 1 MG/DL (ref 0.5–1.4)
DIFFERENTIAL METHOD: ABNORMAL
EOSINOPHIL # BLD AUTO: 0.6 K/UL (ref 0–0.5)
EOSINOPHIL NFR BLD: 6.5 % (ref 0–8)
ERYTHROCYTE [DISTWIDTH] IN BLOOD BY AUTOMATED COUNT: 14.3 % (ref 11.5–14.5)
EST. GFR  (AFRICAN AMERICAN): >60 ML/MIN/1.73 M^2
EST. GFR  (NON AFRICAN AMERICAN): >60 ML/MIN/1.73 M^2
GLUCOSE SERPL-MCNC: 143 MG/DL (ref 70–110)
HCT VFR BLD AUTO: 38.1 % (ref 40–54)
HGB BLD-MCNC: 11.7 G/DL (ref 14–18)
IMM GRANULOCYTES # BLD AUTO: 0.01 K/UL (ref 0–0.04)
IMM GRANULOCYTES NFR BLD AUTO: 0.1 % (ref 0–0.5)
LYMPHOCYTES # BLD AUTO: 3.1 K/UL (ref 1–4.8)
LYMPHOCYTES NFR BLD: 35.7 % (ref 18–48)
MCH RBC QN AUTO: 28.9 PG (ref 27–31)
MCHC RBC AUTO-ENTMCNC: 30.7 G/DL (ref 32–36)
MCV RBC AUTO: 94 FL (ref 82–98)
MONOCYTES # BLD AUTO: 0.7 K/UL (ref 0.3–1)
MONOCYTES NFR BLD: 7.7 % (ref 4–15)
NEUTROPHILS # BLD AUTO: 4.3 K/UL (ref 1.8–7.7)
NEUTROPHILS NFR BLD: 49.5 % (ref 38–73)
NRBC BLD-RTO: 0 /100 WBC
PLATELET # BLD AUTO: 154 K/UL (ref 150–450)
PMV BLD AUTO: 12.9 FL (ref 9.2–12.9)
POTASSIUM SERPL-SCNC: 4.2 MMOL/L (ref 3.5–5.1)
PROT SERPL-MCNC: 7.1 G/DL (ref 6–8.4)
RBC # BLD AUTO: 4.05 M/UL (ref 4.6–6.2)
SODIUM SERPL-SCNC: 138 MMOL/L (ref 136–145)
WBC # BLD AUTO: 8.62 K/UL (ref 3.9–12.7)

## 2022-01-19 PROCEDURE — 36415 COLL VENOUS BLD VENIPUNCTURE: CPT | Mod: PO | Performed by: INTERNAL MEDICINE

## 2022-01-19 PROCEDURE — 80197 ASSAY OF TACROLIMUS: CPT | Performed by: INTERNAL MEDICINE

## 2022-01-19 PROCEDURE — 80053 COMPREHEN METABOLIC PANEL: CPT | Performed by: INTERNAL MEDICINE

## 2022-01-19 PROCEDURE — 82105 ALPHA-FETOPROTEIN SERUM: CPT | Performed by: INTERNAL MEDICINE

## 2022-01-19 PROCEDURE — 84153 ASSAY OF PSA TOTAL: CPT | Performed by: FAMILY MEDICINE

## 2022-01-19 PROCEDURE — 85025 COMPLETE CBC W/AUTO DIFF WBC: CPT | Performed by: INTERNAL MEDICINE

## 2022-01-19 NOTE — TELEPHONE ENCOUNTER
Called patient and gave x-ray results. Also let him know that he can periodically use a heating pad over the next few weeks. Told patient to call if he needed anything further and he verbalized his understanding.

## 2022-01-20 LAB — TACROLIMUS BLD-MCNC: 2.6 NG/ML (ref 5–15)

## 2022-01-21 ENCOUNTER — TELEPHONE (OUTPATIENT)
Dept: NEUROSURGERY | Facility: CLINIC | Age: 67
End: 2022-01-21
Payer: MEDICARE

## 2022-01-21 DIAGNOSIS — I62.00 NONTRAUMATIC SUBDURAL HEMORRHAGE, UNSPECIFIED: ICD-10-CM

## 2022-01-21 NOTE — TELEPHONE ENCOUNTER
lvm for pt per Marcelle she wants him to have ct before visit. I scheduled however next available is in the afternoon on 01/24/2022 . I attempted to contact pt regarding scheduling of ct and make him aware we may need to reschedule clinic appt if he does not have imaging in time. I requested a call back.

## 2022-01-24 ENCOUNTER — TELEPHONE (OUTPATIENT)
Dept: NEUROSURGERY | Facility: CLINIC | Age: 67
End: 2022-01-24
Payer: MEDICARE

## 2022-01-24 ENCOUNTER — TELEPHONE (OUTPATIENT)
Dept: TRANSPLANT | Facility: CLINIC | Age: 67
End: 2022-01-24
Payer: MEDICARE

## 2022-01-24 ENCOUNTER — OFFICE VISIT (OUTPATIENT)
Dept: ENDOCRINOLOGY | Facility: CLINIC | Age: 67
End: 2022-01-24
Payer: MEDICARE

## 2022-01-24 ENCOUNTER — HOSPITAL ENCOUNTER (OUTPATIENT)
Dept: RADIOLOGY | Facility: HOSPITAL | Age: 67
Discharge: HOME | End: 2022-01-24
Payer: MEDICARE

## 2022-01-24 ENCOUNTER — LAB VISIT (OUTPATIENT)
Dept: LAB | Facility: HOSPITAL | Age: 67
End: 2022-01-24
Attending: GENERAL ACUTE CARE HOSPITAL
Payer: MEDICARE

## 2022-01-24 ENCOUNTER — OFFICE VISIT (OUTPATIENT)
Dept: ORTHOPEDICS | Facility: CLINIC | Age: 67
End: 2022-01-24
Payer: MEDICARE

## 2022-01-24 ENCOUNTER — TELEPHONE (OUTPATIENT)
Dept: ENDOCRINOLOGY | Facility: CLINIC | Age: 67
End: 2022-01-24

## 2022-01-24 VITALS
RESPIRATION RATE: 16 BRPM | DIASTOLIC BLOOD PRESSURE: 78 MMHG | SYSTOLIC BLOOD PRESSURE: 132 MMHG | BODY MASS INDEX: 32.79 KG/M2 | WEIGHT: 263.69 LBS | HEIGHT: 75 IN | HEART RATE: 73 BPM | OXYGEN SATURATION: 98 %

## 2022-01-24 VITALS — HEIGHT: 75 IN | WEIGHT: 264 LBS | BODY MASS INDEX: 32.83 KG/M2

## 2022-01-24 DIAGNOSIS — I62.00 NONTRAUMATIC SUBDURAL HEMORRHAGE, UNSPECIFIED: ICD-10-CM

## 2022-01-24 DIAGNOSIS — Z79.4 TYPE 2 DIABETES MELLITUS WITH HYPERGLYCEMIA, WITH LONG-TERM CURRENT USE OF INSULIN: ICD-10-CM

## 2022-01-24 DIAGNOSIS — K74.69 OTHER CIRRHOSIS OF LIVER: ICD-10-CM

## 2022-01-24 DIAGNOSIS — E11.65 TYPE 2 DIABETES MELLITUS WITH HYPERGLYCEMIA, WITH LONG-TERM CURRENT USE OF INSULIN: Primary | ICD-10-CM

## 2022-01-24 DIAGNOSIS — Z79.4 TYPE 2 DIABETES MELLITUS WITH HYPERGLYCEMIA, WITH LONG-TERM CURRENT USE OF INSULIN: Primary | ICD-10-CM

## 2022-01-24 DIAGNOSIS — E11.65 TYPE 2 DIABETES MELLITUS WITH HYPERGLYCEMIA, WITH LONG-TERM CURRENT USE OF INSULIN: ICD-10-CM

## 2022-01-24 DIAGNOSIS — I10 HYPERTENSION, ESSENTIAL: ICD-10-CM

## 2022-01-24 DIAGNOSIS — G56.02 CARPAL TUNNEL SYNDROME OF LEFT WRIST: Primary | ICD-10-CM

## 2022-01-24 DIAGNOSIS — E03.9 HYPOTHYROIDISM, UNSPECIFIED TYPE: ICD-10-CM

## 2022-01-24 DIAGNOSIS — E11.65 UNCONTROLLED TYPE 2 DIABETES MELLITUS WITH HYPERGLYCEMIA: ICD-10-CM

## 2022-01-24 DIAGNOSIS — Z94.4 S/P LIVER TRANSPLANT: Primary | ICD-10-CM

## 2022-01-24 DIAGNOSIS — M79.642 LEFT HAND PAIN: ICD-10-CM

## 2022-01-24 LAB
ALBUMIN SERPL BCP-MCNC: 3.8 G/DL (ref 3.5–5.2)
ANION GAP SERPL CALC-SCNC: 10 MMOL/L (ref 8–16)
BUN SERPL-MCNC: 15 MG/DL (ref 8–23)
C PEPTIDE SERPL-MCNC: 1.21 NG/ML (ref 0.78–5.19)
CALCIUM SERPL-MCNC: 9.6 MG/DL (ref 8.7–10.5)
CHLORIDE SERPL-SCNC: 106 MMOL/L (ref 95–110)
CO2 SERPL-SCNC: 25 MMOL/L (ref 23–29)
CREAT SERPL-MCNC: 1.3 MG/DL (ref 0.5–1.4)
EST. GFR  (AFRICAN AMERICAN): >60 ML/MIN/1.73 M^2
EST. GFR  (NON AFRICAN AMERICAN): 57.7 ML/MIN/1.73 M^2
ESTIMATED AVG GLUCOSE: 166 MG/DL (ref 68–131)
GLUCOSE SERPL-MCNC: 106 MG/DL (ref 70–110)
HBA1C MFR BLD: 7.4 % (ref 4–5.6)
PHOSPHATE SERPL-MCNC: 2.4 MG/DL (ref 2.7–4.5)
POTASSIUM SERPL-SCNC: 4.3 MMOL/L (ref 3.5–5.1)
SODIUM SERPL-SCNC: 141 MMOL/L (ref 136–145)
TSH SERPL DL<=0.005 MIU/L-ACNC: 1.38 UIU/ML (ref 0.4–4)

## 2022-01-24 PROCEDURE — 99499 RISK ADDL DX/OHS AUDIT: ICD-10-PCS | Mod: S$GLB,,, | Performed by: GENERAL ACUTE CARE HOSPITAL

## 2022-01-24 PROCEDURE — 1159F PR MEDICATION LIST DOCUMENTED IN MEDICAL RECORD: ICD-10-PCS | Mod: CPTII,S$GLB,, | Performed by: ORTHOPAEDIC SURGERY

## 2022-01-24 PROCEDURE — 36415 COLL VENOUS BLD VENIPUNCTURE: CPT | Performed by: GENERAL ACUTE CARE HOSPITAL

## 2022-01-24 PROCEDURE — 99999 PR PBB SHADOW E&M-EST. PATIENT-LVL V: ICD-10-PCS | Mod: PBBFAC,,, | Performed by: GENERAL ACUTE CARE HOSPITAL

## 2022-01-24 PROCEDURE — 83036 HEMOGLOBIN GLYCOSYLATED A1C: CPT | Performed by: GENERAL ACUTE CARE HOSPITAL

## 2022-01-24 PROCEDURE — 99999 PR PBB SHADOW E&M-EST. PATIENT-LVL IV: ICD-10-PCS | Mod: PBBFAC,,, | Performed by: ORTHOPAEDIC SURGERY

## 2022-01-24 PROCEDURE — 99999 PR PBB SHADOW E&M-EST. PATIENT-LVL IV: CPT | Mod: PBBFAC,,, | Performed by: ORTHOPAEDIC SURGERY

## 2022-01-24 PROCEDURE — 70450 CT HEAD WITHOUT CONTRAST: ICD-10-PCS | Mod: 26,,, | Performed by: RADIOLOGY

## 2022-01-24 PROCEDURE — 3008F BODY MASS INDEX DOCD: CPT | Mod: CPTII,S$GLB,, | Performed by: GENERAL ACUTE CARE HOSPITAL

## 2022-01-24 PROCEDURE — 1159F MED LIST DOCD IN RCRD: CPT | Mod: CPTII,S$GLB,, | Performed by: ORTHOPAEDIC SURGERY

## 2022-01-24 PROCEDURE — 99024 POSTOP FOLLOW-UP VISIT: CPT | Mod: S$GLB,,, | Performed by: ORTHOPAEDIC SURGERY

## 2022-01-24 PROCEDURE — 1160F PR REVIEW ALL MEDS BY PRESCRIBER/CLIN PHARMACIST DOCUMENTED: ICD-10-PCS | Mod: CPTII,S$GLB,, | Performed by: GENERAL ACUTE CARE HOSPITAL

## 2022-01-24 PROCEDURE — 1159F MED LIST DOCD IN RCRD: CPT | Mod: CPTII,S$GLB,, | Performed by: GENERAL ACUTE CARE HOSPITAL

## 2022-01-24 PROCEDURE — 84681 ASSAY OF C-PEPTIDE: CPT | Performed by: GENERAL ACUTE CARE HOSPITAL

## 2022-01-24 PROCEDURE — 99499 UNLISTED E&M SERVICE: CPT | Mod: S$GLB,,, | Performed by: GENERAL ACUTE CARE HOSPITAL

## 2022-01-24 PROCEDURE — 99999 PR PBB SHADOW E&M-EST. PATIENT-LVL V: CPT | Mod: PBBFAC,,, | Performed by: GENERAL ACUTE CARE HOSPITAL

## 2022-01-24 PROCEDURE — 3008F BODY MASS INDEX DOCD: CPT | Mod: CPTII,S$GLB,, | Performed by: ORTHOPAEDIC SURGERY

## 2022-01-24 PROCEDURE — 3008F PR BODY MASS INDEX (BMI) DOCUMENTED: ICD-10-PCS | Mod: CPTII,S$GLB,, | Performed by: ORTHOPAEDIC SURGERY

## 2022-01-24 PROCEDURE — 3075F SYST BP GE 130 - 139MM HG: CPT | Mod: CPTII,S$GLB,, | Performed by: GENERAL ACUTE CARE HOSPITAL

## 2022-01-24 PROCEDURE — 3008F PR BODY MASS INDEX (BMI) DOCUMENTED: ICD-10-PCS | Mod: CPTII,S$GLB,, | Performed by: GENERAL ACUTE CARE HOSPITAL

## 2022-01-24 PROCEDURE — 99214 OFFICE O/P EST MOD 30 MIN: CPT | Mod: S$GLB,,, | Performed by: GENERAL ACUTE CARE HOSPITAL

## 2022-01-24 PROCEDURE — 3078F DIAST BP <80 MM HG: CPT | Mod: CPTII,S$GLB,, | Performed by: GENERAL ACUTE CARE HOSPITAL

## 2022-01-24 PROCEDURE — 1159F PR MEDICATION LIST DOCUMENTED IN MEDICAL RECORD: ICD-10-PCS | Mod: CPTII,S$GLB,, | Performed by: GENERAL ACUTE CARE HOSPITAL

## 2022-01-24 PROCEDURE — 80069 RENAL FUNCTION PANEL: CPT | Performed by: GENERAL ACUTE CARE HOSPITAL

## 2022-01-24 PROCEDURE — 99024 PR POST-OP FOLLOW-UP VISIT: ICD-10-PCS | Mod: S$GLB,,, | Performed by: ORTHOPAEDIC SURGERY

## 2022-01-24 PROCEDURE — 3078F PR MOST RECENT DIASTOLIC BLOOD PRESSURE < 80 MM HG: ICD-10-PCS | Mod: CPTII,S$GLB,, | Performed by: GENERAL ACUTE CARE HOSPITAL

## 2022-01-24 PROCEDURE — 70450 CT HEAD/BRAIN W/O DYE: CPT | Mod: 26,,, | Performed by: RADIOLOGY

## 2022-01-24 PROCEDURE — 70450 CT HEAD/BRAIN W/O DYE: CPT | Mod: TC

## 2022-01-24 PROCEDURE — 1160F RVW MEDS BY RX/DR IN RCRD: CPT | Mod: CPTII,S$GLB,, | Performed by: GENERAL ACUTE CARE HOSPITAL

## 2022-01-24 PROCEDURE — 99214 PR OFFICE/OUTPT VISIT, EST, LEVL IV, 30-39 MIN: ICD-10-PCS | Mod: S$GLB,,, | Performed by: GENERAL ACUTE CARE HOSPITAL

## 2022-01-24 PROCEDURE — 84443 ASSAY THYROID STIM HORMONE: CPT | Performed by: GENERAL ACUTE CARE HOSPITAL

## 2022-01-24 PROCEDURE — 3075F PR MOST RECENT SYSTOLIC BLOOD PRESS GE 130-139MM HG: ICD-10-PCS | Mod: CPTII,S$GLB,, | Performed by: GENERAL ACUTE CARE HOSPITAL

## 2022-01-24 RX ORDER — HYDROCODONE BITARTRATE AND ACETAMINOPHEN 5; 325 MG/1; MG/1
22 TABLET ORAL
Status: ON HOLD | COMMUNITY
Start: 2022-01-19 | End: 2022-08-26 | Stop reason: HOSPADM

## 2022-01-24 RX ORDER — IBUPROFEN 200 MG
16 TABLET ORAL
Qty: 50 TABLET | Refills: 12 | Status: ON HOLD | COMMUNITY
Start: 2022-01-24 | End: 2023-05-16

## 2022-01-24 RX ORDER — DULAGLUTIDE 0.75 MG/.5ML
0.75 INJECTION, SOLUTION SUBCUTANEOUS
Qty: 4 PEN | Refills: 11 | Status: CANCELLED | OUTPATIENT
Start: 2022-01-24 | End: 2023-01-24

## 2022-01-24 RX ORDER — FLASH GLUCOSE SENSOR
KIT MISCELLANEOUS
Qty: 2 KIT | Status: SHIPPED | OUTPATIENT
Start: 2022-01-24 | End: 2023-05-26

## 2022-01-24 RX ORDER — INSULIN GLARGINE 300 U/ML
40 INJECTION, SOLUTION SUBCUTANEOUS DAILY
Qty: 3 ML | Refills: 11 | Status: SHIPPED | OUTPATIENT
Start: 2022-01-24 | End: 2024-01-12 | Stop reason: SDUPTHER

## 2022-01-24 NOTE — TELEPHONE ENCOUNTER
Spoke with pt and explained to him per Marcelle he is scheduled for a ct of the head today @ [facility]. He cancelled his f/u appt today with us as well being he already states he has too many appointments today. Pt has been instructed to contact clinic once his ct is obtained. Pt is aware of ct and verbalizes his understanding.

## 2022-01-24 NOTE — TELEPHONE ENCOUNTER
Letter sent, labs stable and no medication changes are needed. Repeat labs due 7/12/22 per protocol.  ----- Message from James Coleman MD sent at 1/21/2022  3:59 PM CST -----  Results reviewed

## 2022-01-24 NOTE — PROGRESS NOTES
Subjective:      Patient ID: Vick Gonzalez is a 66 y.o. male.    Chief Complaint:  DM2, Hypothyroidism, HTN, HLD; Initial visit     History of Present Illness  64 YO Male w/ DM2 complicated by pancreatitis, ESLD s/p Liver transplant on 2009,  HTN, HLD, Hypothyroidism, and PUD that presents to the endocrine clinic for follow up.  Pt today reports feels well and endorses resolution of hypoglycemic symptoms he was having on prior clinic visit.  Denies hypo or hyperglycemic symptoms.         Interval history:     Pt was last seen on 10/2021 and due to hypoglycemias plan was to decrease Toujeo to 40 units daily and Novolog 15 units TID.  Pt was sent to Podiatry due to stepping on a nail.        With regards to Diabetes Mellitus:   -Type 2   (Transplant Dm  After liver transplant)   -Duration:   Dx 2009   -Microvascular complications: (Nephropathy/CKD, Neuropathy)  -Macrovascular complications:  (DENIES)   -DM Medications:   Toujeo 40 units,  Novolog 15 units TID,    -Previously tried medications:   Toujeo  (Was stopped on last admission)   -Compliance w/ Meds:   Misses insulin 1-2 a week    -Hyperglycemia symptoms:  DENIES   -Hypoglycemia symptoms:   Has had 60s  (Last week 3:30am)  Usually fasting 1-3 times a week  (Likely 2/2 heavy basal regimen)   -Know how to correct hypoglycemias:  NO, WILL DO TEACHING TODAY   -Glucose monitoring:   AM (130- 140)  L (130 - 140s)  D (140s)    -Diet:   B (Banana) L (Chicken),  D (Soup, cabbage),  D ( similar)  Sugary drinks (every other day)    -Activity:  Sedentary       Diabetes Management Status    Statin: Taking (Rosuvastatin 5mg)   ACE/ARB: Taking (Lisinopril 40mg)     Screening or Prevention Patient's value Goal Complete/Controlled?   HgA1C Testing and Control   Lab Results   Component Value Date    HGBA1C 10.1 (H) 10/11/2021      Annually/Less than 8% No   Lipid profile : 10/11/2021 Annually Yes   LDL control Lab Results   Component Value Date    LDLCALC 18.6 (L) 10/11/2021     Annually/Less than 100 mg/dl  Yes   Nephropathy screening Lab Results   Component Value Date    LABMICR 443.0 03/27/2017     Lab Results   Component Value Date    PROTEINUA Trace (A) 02/23/2021    Annually Yes   Blood pressure BP Readings from Last 1 Encounters:   01/18/22 (!) 154/82    Less than 140/90 Yes   Dilated retinal exam : 12/08/2020 Annually Yes   Foot exam   : 11/01/2021 Annually Yes      With regards to hypothyroidism:   -Type: (Unspecified)   -Clinically Euthyroid   Lab Results   Component Value Date    TSH 1.046 11/11/2020      -  Lab Results   Component Value Date    TSH 1.046 11/11/2020    FREET4 0.86 03/28/2018        -TPO Ab??  NON AVAILABLE   -Symptoms:  (Cold, fatigue, weight gain, constipation, dry skin, Lethargy)   -Levothyroxine 88mcg     ROS:   As above    Objective:     There were no vitals taken for this visit.  BP Readings from Last 3 Encounters:   01/18/22 (!) 154/82   12/31/21 112/88   12/24/21 (!) 138/93     Wt Readings from Last 1 Encounters:   01/24/22 0905 119.7 kg (264 lb)     There is no height or weight on file to calculate BMI.      Physical Exam  Vitals reviewed.   Constitutional:       General: He is not in acute distress.     Appearance: Normal appearance. He is well-developed. He is not ill-appearing.   HENT:      Nose: Nose normal. No rhinorrhea.      Mouth/Throat:      Mouth: Mucous membranes are moist.   Eyes:      Extraocular Movements: Extraocular movements intact.      Pupils: Pupils are equal, round, and reactive to light.      Comments: No proptosis, No lid lag, No conjunctival erythema    Neck:      Thyroid: No thyromegaly.      Trachea: No tracheal deviation.      Comments: No thyromegaly or Thyroid nodules palpated on exam   Cardiovascular:      Rate and Rhythm: Normal rate and regular rhythm.      Pulses: Normal pulses.   Pulmonary:      Effort: Pulmonary effort is normal.      Breath sounds: Normal breath sounds.   Abdominal:      Palpations: Abdomen is soft.  There is no mass.      Tenderness: There is no abdominal tenderness.      Hernia: No hernia is present.      Comments: No scar tissue, No Violaceous striae    Musculoskeletal:         General: No swelling.      Cervical back: Neck supple. No tenderness.      Right lower leg: No edema.      Left lower leg: No edema.   Skin:     General: Skin is warm.      Findings: No rash.   Neurological:      General: No focal deficit present.      Mental Status: He is alert and oriented to person, place, and time.   Psychiatric:         Mood and Affect: Mood normal.         Judgment: Judgment normal.     Protective Sensation (w/ 10 gram monofilament):  Right: Decreased  Left: Decreased    Visual Inspection:  Ulceration -  Left    Pedal Pulses:   Right: Present  Left: Present    Posterior tibialis:   Right:Present  Left: Present             Lab Review:   Lab Results   Component Value Date    HGBA1C 10.1 (H) 10/11/2021     Lab Results   Component Value Date    CHOL 125 10/11/2021    HDL 27 (L) 10/11/2021    LDLCALC 18.6 (L) 10/11/2021    TRIG 397 (H) 10/11/2021    CHOLHDL 21.6 10/11/2021     Lab Results   Component Value Date     01/19/2022    K 4.2 01/19/2022     01/19/2022    CO2 22 (L) 01/19/2022     (H) 01/19/2022    BUN 12 01/19/2022    CREATININE 1.0 01/19/2022    CALCIUM 9.4 01/19/2022    PROT 7.1 01/19/2022    ALBUMIN 3.7 01/19/2022    BILITOT 0.5 01/19/2022    ALKPHOS 82 01/19/2022    AST 21 01/19/2022    ALT 21 01/19/2022    ANIONGAP 10 01/19/2022    ESTGFRAFRICA >60.0 01/19/2022    EGFRNONAA >60.0 01/19/2022    TSH 1.046 11/11/2020     Vit D, 25-Hydroxy   Date Value Ref Range Status   10/11/2021 30 30 - 96 ng/mL Final     Comment:     Vitamin D deficiency.........<10 ng/mL                              Vitamin D insufficiency......10-29 ng/mL       Vitamin D sufficiency........> or equal to 30 ng/mL  Vitamin D toxicity............>100 ng/mL         Assessment and Plan     Uncontrolled type 2 diabetes  mellitus with hyperglycemia  Lab Results   Component Value Date    HGBA1C 10.1 (H) 10/11/2021      -FS log   AT GOAL   -Diet, exercise, lifestyle modifications and A1c Goals discussed   -Hypoglycemia symptoms and plan of action discussed   -Low carb diet     PLAN:     Due to glucose log at goal, resolution of hypoglycemias no recent A1C.  I will recommend the following.       -C/w Toujeo to 40 units   - C/w Novolog got 14 units  TID w/ meals   -Will send Freestyle della sensor for better control of DM and remote monitoring   -DM Education   -Ophthalmology referral   -Keep glucose log for review prior to next visit   -Lipid panel, A1C, STEPHANIE, Renal panel and C peptide prior to next visit       Hypothyroidism, unspecified type  C/w LT4 88mcg   Send TSH and TPO   Will adjust accordingly pending on results     Hypertension, essential  BP controlled on current BP meds   On ACE for renal protection   Low Na diet

## 2022-01-24 NOTE — PROGRESS NOTES
"Subjective:      Patient ID: Vick Gonzalez is a 66 y.o. male.  Chief Complaint: Post-op Evaluation (Left CTR (SX 10/29), having pain, numbness and tingling)      HPI  Vick Gonzalez is a  66 y.o. male presenting today for post op visit.  He is s/p left carpal tunnel release and ulnar nerve release left elbow now 3 months postop  Still having some numbness but mainly weakness in the left arm  Previously ordered therapy but was not able to follow-up this he would like to have therapy reordered  .     Review of patient's allergies indicates:  No Known Allergies      Current Outpatient Medications   Medication Sig Dispense Refill    allopurinoL (ZYLOPRIM) 100 MG tablet TAKE 1 TABLET BY MOUTH TWICE A DAY (Patient taking differently: Take 100 mg by mouth 2 (two) times a day.) 60 tablet 7    aspirin 81 MG Chew Take 1 tablet (81 mg total) by mouth once daily. 90 tablet 3    BD ULTRA-FINE MENDY PEN NEEDLES 32 gauge x 5/32" Ndle       calcium carbonate-vitamin D3 (CALCIUM 600 WITH VITAMIN D3) 600 mg(1,500mg) -400 unit Chew Take 1 tablet by mouth once daily.       gabapentin (NEURONTIN) 800 MG tablet TAKE 1 TABLET BY MOUTH THREE TIMES A DAY 90 tablet 3    glucose 4 GM chewable tablet Take 4 tablets (16 g total) by mouth as needed for Low blood sugar.  12    HYDROcodone-acetaminophen (NORCO) 5-325 mg per tablet       insulin glargine U-300 conc (TOUJEO MAX U-300 SOLOSTAR) 300 unit/mL (3 mL) insulin pen Inject 40 Units into the skin once daily. 2 pen 11    insulin lispro 100 unit/mL pen Inject 20 Units into the skin 3 (three) times daily with meals. 15 mL 11    lancets 30 gauge Misc by Misc.(Non-Drug; Combo Route) route 4 (four) times daily before meals and nightly. 200 each 11    levothyroxine (SYNTHROID) 88 MCG tablet TAKE 1 TABLET BY MOUTH EVERY DAY (Patient taking differently: Take 88 mcg by mouth before breakfast.) 90 tablet 4    lisinopriL (PRINIVIL,ZESTRIL) 40 MG tablet Take 1 tablet (40 mg total) by mouth " "once daily. 90 tablet 3    metoprolol succinate (TOPROL-XL) 200 MG 24 hr tablet TAKE 1 TABLET BY MOUTH EVERY DAY (Patient taking differently: Take 200 mg by mouth once daily.) 90 tablet 3    multivitamin (THERAGRAN) per tablet Take 1 tablet by mouth once daily.       NIFEdipine (ADALAT CC) 60 MG TbSR TAKE 1 TABLET BY MOUTH ONCE DAILY 90 tablet 0    NOVOFINE PLUS 32 gauge x 1/6" Ndle       pen needle, diabetic (BD ULTRA-FINE MENDY PEN NEEDLE) 32 gauge x 5/32" Ndle TEST WITH NOVOLOG THREE TIMES A DAY WITH MEALS AND WITH LEVEMIR AT BEDTIME 100 each 11    rosuvastatin (CRESTOR) 5 MG tablet TAKE 1 TABLET BY MOUTH EVERY DAY 90 tablet 0    sertraline (ZOLOFT) 100 MG tablet TAKE 1 TABLET (100 MG TOTAL) BY MOUTH ONCE DAILY. 30 tablet 7    tacrolimus (PROGRAF) 1 MG Cap TAKE 2 CAPSULES (2 MG TOTAL) BY MOUTH IN THE MORNING & TAKE 1 CAPSULE (1 MG TOTAL) IN THE EVENING. 90 capsule 11    traZODone (DESYREL) 50 MG tablet TAKE 1 TABLET (50 MG TOTAL) BY MOUTH EVERY EVENING. 30 tablet 11    TRUE METRIX GLUCOSE METER Misc TEST 4 (FOUR) TIMES DAILY BEFORE MEALS AND NIGHTLY. 1 each 0    TRUE METRIX GLUCOSE TEST STRIP Strp USE 3 TIMES DAILY TO TEST BLOOD GLUCOSE LEVEL 100 strip 11     No current facility-administered medications for this visit.       Past Medical History:   Diagnosis Date    Anemia     Anxiety     Chronic pain syndrome 7/13/2011    CKD (chronic kidney disease), stage III     Diabetes mellitus type II, uncontrolled     Discitis of lumbosacral region 1/16/2015    ED (erectile dysfunction)     Encounter for blood transfusion     Genital herpes     Gout, arthritis     History of alcohol abuse     History of hepatitis C, s/p successful Rx w/ SVR24 (cure) - 5/2018 8/23/2011    Completed 24wks Epclusa + RBV w/SVR12 - 2/2018  -     History of positive PPD, treatment status unknown     Pulmonary granulomas, negative sputum cultures for AFB and indeterminate quantferon test    History of substance abuse     " "Hypertension     Hypothyroidism     Liver replaced by transplant 8/23/2011    DATE: 12/16/2013  LIVER BIOPSY : REASON:  hep C staging  PATHOLOGY COMMITTEE NOTE/PLAN: :grade  1 / stage 1        Pancreatitis 2016    Peptic ulcer disease        Past Surgical History:   Procedure Laterality Date    CARPAL TUNNEL RELEASE Left 10/29/2021    Procedure: RELEASE, CARPAL TUNNEL;  Surgeon: Jameson Alejo Jr., MD;  Location: [facility] OR;  Service: Orthopedics;  Laterality: Left;    CHOLECYSTECTOMY      INJECTION OF JOINT Right 12/2/2019    Procedure: INJECTION, JOINT SI;  Surgeon: Thu Penn MD;  Location: [facility] PAIN MGT;  Service: Pain Management;  Laterality: Right;  RT SI JNT INJ    LIVER TRANSPLANT  06/2010    SPINE SURGERY         OBJECTIVE:   PHYSICAL EXAM:  Height: 6' 3" (190.5 cm) Weight: 119.7 kg (264 lb)  Vitals:    01/24/22 0905   Weight: 119.7 kg (264 lb)   Height: 6' 3" (1.905 m)   PainSc:   6     Ortho/SPM Exam  Examination left arm the incision well healed left elbow and left hand  Mild swelling in the palm  Tinel sign negative at the wrist  No evidence of infection  Range of motion fingers full  strength decreased  Sensation decreased at the tips of all fingers  No atrophy noted      RADIOGRAPHS:  None  Comments: I have personally reviewed the imaging and I agree with the above radiologist's report.    ASSESSMENT/PLAN:     IMPRESSION:  Status post left carpal tunnel release and ulnar nerve release    PLAN:  He still having weakness so I recommended some therapy for his left hand and arm  I have also given him a squeeze ball which she can work with at home  Increase activities as tolerated  As far as the numbness is concerned a portion of this may be related to diabetic neuropathy and I explained that to him today  I am still hopeful that some of his numbness will resolve over time however    FOLLOW UP:  6-8 weeks    Disclaimer: This note has been generated using voice-recognition software. There may " be typographical errors that have been missed during proof-reading.

## 2022-01-24 NOTE — LETTER
January 24, 2022    Vick Carlos  [address] [facility] [address]          Dear Vick Gonzalez:  MRN: [id number]    This is a follow up to your recent labs, your lab results were stable.  There are no medicine changes.  Please have your labs drawn again on 7/12/22.      If you cannot have your labs drawn on the scheduled date, it is your responsibility to call the transplant department to reschedule.  Please call [phone] and ask to speak to [facility] -  for all scheduling requests.     Sincerely,      Kandy Herrera, RN, BSN, CCTC  Sr Liver Transplant Coordinator  [facility]  [address] [place] [id number]  [phone]

## 2022-01-25 ENCOUNTER — LAB VISIT (OUTPATIENT)
Dept: LAB | Facility: HOSPITAL | Age: 67
End: 2022-01-25
Attending: GENERAL ACUTE CARE HOSPITAL
Payer: MEDICARE

## 2022-01-25 DIAGNOSIS — E11.65 TYPE 2 DIABETES MELLITUS WITH HYPERGLYCEMIA, WITH LONG-TERM CURRENT USE OF INSULIN: ICD-10-CM

## 2022-01-25 DIAGNOSIS — Z79.4 TYPE 2 DIABETES MELLITUS WITH HYPERGLYCEMIA, WITH LONG-TERM CURRENT USE OF INSULIN: ICD-10-CM

## 2022-01-25 DIAGNOSIS — E11.65 UNCONTROLLED TYPE 2 DIABETES MELLITUS WITH HYPERGLYCEMIA: ICD-10-CM

## 2022-01-25 LAB
ALBUMIN SERPL BCP-MCNC: 4 G/DL (ref 3.5–5.2)
ANION GAP SERPL CALC-SCNC: 12 MMOL/L (ref 8–16)
BUN SERPL-MCNC: 11 MG/DL (ref 8–23)
C PEPTIDE SERPL-MCNC: 0.81 NG/ML (ref 0.78–5.19)
CALCIUM SERPL-MCNC: 9.7 MG/DL (ref 8.7–10.5)
CHLORIDE SERPL-SCNC: 104 MMOL/L (ref 95–110)
CHOLEST SERPL-MCNC: 113 MG/DL (ref 120–199)
CHOLEST/HDLC SERPL: 3.8 {RATIO} (ref 2–5)
CO2 SERPL-SCNC: 21 MMOL/L (ref 23–29)
CREAT SERPL-MCNC: 1 MG/DL (ref 0.5–1.4)
EST. GFR  (AFRICAN AMERICAN): >60 ML/MIN/1.73 M^2
EST. GFR  (NON AFRICAN AMERICAN): >60 ML/MIN/1.73 M^2
ESTIMATED AVG GLUCOSE: 166 MG/DL (ref 68–131)
GLUCOSE SERPL-MCNC: 125 MG/DL (ref 70–110)
HBA1C MFR BLD: 7.4 % (ref 4–5.6)
HDLC SERPL-MCNC: 30 MG/DL (ref 40–75)
HDLC SERPL: 26.5 % (ref 20–50)
LDLC SERPL CALC-MCNC: 52.4 MG/DL (ref 63–159)
NONHDLC SERPL-MCNC: 83 MG/DL
PHOSPHATE SERPL-MCNC: 2.6 MG/DL (ref 2.7–4.5)
POTASSIUM SERPL-SCNC: 4.1 MMOL/L (ref 3.5–5.1)
SODIUM SERPL-SCNC: 137 MMOL/L (ref 136–145)
TRIGL SERPL-MCNC: 153 MG/DL (ref 30–150)
TSH SERPL DL<=0.005 MIU/L-ACNC: 2.03 UIU/ML (ref 0.4–4)

## 2022-01-25 PROCEDURE — 84681 ASSAY OF C-PEPTIDE: CPT | Performed by: GENERAL ACUTE CARE HOSPITAL

## 2022-01-25 PROCEDURE — 84443 ASSAY THYROID STIM HORMONE: CPT | Performed by: GENERAL ACUTE CARE HOSPITAL

## 2022-01-25 PROCEDURE — 80061 LIPID PANEL: CPT | Performed by: GENERAL ACUTE CARE HOSPITAL

## 2022-01-25 PROCEDURE — 80069 RENAL FUNCTION PANEL: CPT | Performed by: GENERAL ACUTE CARE HOSPITAL

## 2022-01-25 PROCEDURE — 36415 COLL VENOUS BLD VENIPUNCTURE: CPT | Mod: PO | Performed by: GENERAL ACUTE CARE HOSPITAL

## 2022-01-25 PROCEDURE — 83036 HEMOGLOBIN GLYCOSYLATED A1C: CPT | Performed by: GENERAL ACUTE CARE HOSPITAL

## 2022-01-26 ENCOUNTER — TELEPHONE (OUTPATIENT)
Dept: FAMILY MEDICINE | Facility: CLINIC | Age: 67
End: 2022-01-26
Payer: MEDICARE

## 2022-01-26 NOTE — TELEPHONE ENCOUNTER
----- Message from Emanuel Lopez MD sent at 1/18/2022  9:15 AM CST -----  Regarding: home bp call  Please call patient to find out about home blood pressure readings.  Elevated at recent appointment but he did not take his meds.

## 2022-01-27 ENCOUNTER — PATIENT OUTREACH (OUTPATIENT)
Dept: ADMINISTRATIVE | Facility: OTHER | Age: 67
End: 2022-01-27
Payer: MEDICARE

## 2022-01-27 NOTE — PROGRESS NOTES
Health Maintenance Due   Topic Date Due    Complete Opioid Risk Tool  Never done    Urine Drug Screen  09/03/2021    COVID-19 Vaccine (3 - Booster for Pfizer series) 09/26/2021    Eye Exam  12/08/2021     Updates were requested from care everywhere.  Chart was reviewed for overdue Proactive [facility] Encounters ([facility]) topics (CRS, Breast Cancer Screening, Eye exam)  Health Maintenance has been updated.  LINKS immunization registry triggered.  Immunizations were reconciled.

## 2022-02-01 ENCOUNTER — CLINICAL SUPPORT (OUTPATIENT)
Dept: REHABILITATION | Facility: HOSPITAL | Age: 67
End: 2022-02-01
Attending: ORTHOPAEDIC SURGERY
Payer: MEDICARE

## 2022-02-01 DIAGNOSIS — M79.642 LEFT HAND PAIN: ICD-10-CM

## 2022-02-01 DIAGNOSIS — R29.898 LEFT HAND WEAKNESS: ICD-10-CM

## 2022-02-01 PROCEDURE — 97167 OT EVAL HIGH COMPLEX 60 MIN: CPT | Mod: PN

## 2022-02-01 NOTE — PLAN OF CARE
"[facility]  Occupational Therapy Initial Evaluation    Date: 2022  Name: Vick Gonzalez  Clinic Number: [id number]    Therapy Diagnosis:   Encounter Diagnoses   Name Primary?    Left hand pain     Left hand weakness      Physician: Jameson Alejo Jr., *    Physician Orders:   OT eval and treat.  Increase activities as tolerated  Medical Diagnosis: M79.642 (ICD-10-CM) - Left hand pain  Surgical Procedure and Date: 10/29 CTR with ulnar nerve release  Evaluation Date: 22  Insurance Authorization Period Expiration: through 23  Plan of Care Expiration: 3/25/22  Date of Return to MD: 3/7/22  Visit # / Visits authorized:   FOTO: initial eval     Precautions:  Standard, Diabetes and organ transplant    Time In: 1345  Time Out: 1430  Total Appointment Time (timed & untimed codes): 45 minutes      SUBJECTIVE     Date of Onset: 10/29/21    History of Current Condition/Mechanism of Injury: Vick reports: "My arm feels the same even after the surgery."    Falls: 2 months ago after slipping off a ladder. Some injuries were acquired, including left arm injuries post surgically.    Involved Side: left  Dominant Side: Right  Imagin22 x-ray read by Dr. Jimenez reads, "Normal findings."  Prior Therapy: none  Occupation:  Retired.    Functional Limitations/Social History:    Previous functional status includes: Independent with all ADLs.     Current Functional Status   Home/Living environment: lives with their son      Limitation of Functional Status as follows:   ADLs/IADLs:     - Feeding: indpendent    - Bathing: indpendent    - Dressing/Grooming: left hand numbness and absence of strength leads to dropping things and other incoordination.    - Driving: drives automatic transmission    - Toileting: uses left hand for cleaning self, which takes additional time     Leisure: Fishing. When with someone else, he asks them to tie on a hook, bait it, and other fine motor " tasks.    Pain:  Functional Pain Scale Rating 0-10: Current 5/10, worst 10/10, best 3/10   Location: left hand  Description: Burning, Tight, Tingling, Deep, Superficial, Numb, Sharp, Shooting, Hot and Variable  Aggravating Factors: Night Time  Easing Factors: nothing    Patient's Goals for Therapy: pain control, restoring strength, and improving sensation.    Medical History:   Past Medical History:   Diagnosis Date    Anemia     Anxiety     Chronic pain syndrome 7/13/2011    CKD (chronic kidney disease), stage III     Diabetes mellitus type II, uncontrolled     Discitis of lumbosacral region 1/16/2015    ED (erectile dysfunction)     Encounter for blood transfusion     Genital herpes     Gout, arthritis     History of alcohol abuse     History of hepatitis C, s/p successful Rx w/ SVR24 (cure) - 5/2018 8/23/2011    Completed 24wks Epclusa + RBV w/SVR12 - 2/2018  -     History of positive PPD, treatment status unknown     Pulmonary granulomas, negative sputum cultures for AFB and indeterminate quantferon test    History of substance abuse     Hypertension     Hypothyroidism     Liver replaced by transplant 8/23/2011    DATE: 12/16/2013  LIVER BIOPSY : REASON:  hep C staging  PATHOLOGY COMMITTEE NOTE/PLAN: :grade  1 / stage 1        Pancreatitis 2016    Peptic ulcer disease        Surgical History:    has a past surgical history that includes Liver transplant (06/2010); Cholecystectomy; Spine surgery; Injection of joint (Right, 12/2/2019); and Carpal tunnel release (Left, 10/29/2021).    Medications:   has a current medication list which includes the following prescription(s): allopurinol, aspirin, bd ultra-fine veronique pen needle, calcium carbonate-vitamin d3, freestyle della 2 sensor, gabapentin, glucose, glucose, hydrocodone-acetaminophen, toujeo max u-300 solostar, insulin lispro, lancets, levothyroxine, lisinopril, metoprolol succinate, multivitamin, nifedipine, novofine plus, pen needle,  diabetic, rosuvastatin, sertraline, tacrolimus, trazodone, true metrix glucose meter, true metrix glucose test strip, and [DISCONTINUED] insulin aspart u-100.    Allergies:   Review of patient's allergies indicates:  No Known Allergies       OBJECTIVE     Observation/Appearance: forward head posture, rounded shoulders, wasting in bilateral thenar eminences.     Edema. Measured in centimeters.   2/1/2022 2/1/2022    Left Right   2in. Above elbow 32 30   2in. Below elbow 30.5 29   Wrist Crease 18 18.8   Distal palmar crease 22.4 22.5     Edema. Measured in centimeters.   2/1/2022 2/1/2022    Left Right   Long:       P1 6.8 6.8   P2 6.0 6.0   Thumb:     Prox. Phalanx 7.1 7     Elbow and Wrist ROM.   Full    Hand ROM.   Full     Strength (Dynamometer) and Pinch Strength (Pinch Gauge)  Measured in pounds.   2/1/2022 2/1/2022    Left Right   Rung II 50 65   Key Pinch 9 17   3pt Pinch 5 13   2pt Pinch 4 7       Sensation   2/1/2022 2/1/2022    Left Right   [place] Mendy     Normal 1.65-2.83     Diminished Light Touch 3.22-3.61     Diminished Protective 3.84-4.31 Radial dist All dist.   Loss of Protective 4.56-6.65     Untestable >6.65 Ulnar and median    2 Point Discrimination     Static     Dynamic       Manual Muscle Test   2/1/2022 2/1/2022    Left Right   Wrist Extension  4- 4-   Wrist Flexion 4- 4-   Radial Deviation 4- 4   Ulnar Deviation 4- 4   Supination 4 4   Pronation 4 4   Elbow Extension 4- 4-   Elbow Flexion 4 3+   Lumbricals 3 4   Opponens 3 4   Adductor dig min 3- 5   PADs 3 5   DABs 3 5     Special tests.  froments sign               + left, a little on right.     Stereognosis testing of 7 objects included golf ball, playing card, pen, rubber band, giant paperclip, nut, and marble. He could only identify the marble, and in his right hand.    Limitation/Restriction for FOTO hand Survey    Therapist reviewed FOTO scores for Vick Gonzalez on 2/1/2022.   FOTO documents entered into EPIC - see Media  section.    Limitation Score: 42%         Patient Education and Home Exercises      Education provided:   - benefits of OT treatment    Patient/Family Education: role of OT, goals for OT, scheduling/cancellations - pt verbalized understanding. Discussed insurance limitations with patient.    ASSESSMENT     Vick Gonzalez is a 66 y.o. male referred to outpatient occupational therapy and presents with a medical diagnosis of left hand pain and left hand weakness.  Patient presents with the following therapy deficits: Decreased  strength, Decreased pinch strength, Decreased muscle strength, Decreased functional hand use, Increased pain, Diminished/Impaired Sensation and Diminished/Impaired Coordination and demonstrates limitations as described in the chart below. Following medical record review it is determined that pt will benefit from occupational therapy services in order to maximize pain free and/or functional use of left hand. The following goals were discussed with the patient and patient is in agreement with them as to be addressed in the treatment plan. The patient's rehab potential is Guarded.     Anticipated barriers to occupational therapy: cervical spondylosis indicated by MRI on 12/24/22, diabetic neuropathy.     Pt has no cultural, educational or language barriers to learning provided.    Profile and History Assessment of Occupational Performance Level of Clinical Decision Making Complexity Score   Occupational Profile:   Vick Gonzalez is a 66 y.o. male who lives with their son and is retired Vick Gonzalez has difficulty with  ADLs and IADLs as listed previously, which  Affecting hisdaily functional abilities.      Comorbidities:    has a past medical history of Anemia, Anxiety, Chronic pain syndrome, CKD (chronic kidney disease), stage III, Diabetes mellitus type II, uncontrolled, Discitis of lumbosacral region, ED (erectile dysfunction), Encounter for blood transfusion, Genital herpes, Gout,  arthritis, History of alcohol abuse, History of hepatitis C, s/p successful Rx w/ SVR24 (cure) - 5/2018, History of positive PPD, treatment status unknown, History of substance abuse, Hypertension, Hypothyroidism, Liver replaced by transplant, Pancreatitis, and Peptic ulcer disease.    Medical and Therapy History Review:   Extensive               Performance Deficits    Physical:  Muscle Power/Strength  Muscle Endurance  Control of Voluntary Movement   Strength  Pinch Strength  Fine Motor Coordination  Proprioception Functions  Tactile Functions    Cognitive:  Safety Awareness/Insight to Disability    Psychosocial:    Habits  Routines  Rituals     Clinical Decision Making:  high    Assessment Process:  Comprehensive Assessments    Modification/Need for Assistance:  Significant Modifications/Assistance    Intervention Selection:  Multiple Treatment Options       high  Based on PMHX, co morbidities , data from assessments and functional level of assistance required with task and clinical presentation directly impacting function.         Goals:   The following goals were discussed with the patient and patient is in agreement with them as to be addressed in the treatment plan.   Long Term Goals (LTGs); to be met by discharge.  LTG #1: Pt will demo improved FOTO score by 20 points.   LTG #2: Pt will return to prior level of function for ADLs and household management.   LTG #3: Pt will improve  strength in left hand by 10 lbs.    Short Term Goals (STGs); to be met within  weeks (3/25/22).  STG #1: Pt will report 3 out of 10 pain level with resting hand pain.  STG #2: Pt will report/demo key pinch of 12 lbs.  STG #3: Pt will report/demo Set-up Assistance with changing a lightbulb overhead.  STG #4: Pt will demonstrate independence with issued HEP.   STG #5: Pt will push up on hands with minimal difficulty.    PLAN   Plan of Care Certification: 2/1/2022 to 3/25/22.     Outpatient Occupational Therapy 1 times weekly  for 6 weeks to include the following interventions: Paraffin, Fluidotherapy, Manual therapy/joint mobilizations, Modalities for pain management, Therapeutic exercises/activities., Strengthening, Orthotic Fabrication/Fit/Training, Edema Control, Joint Protection and Energy Conservation.      SANTHSOH CONTRERAS OT      I CERTIFY THE NEED FOR THESE SERVICES FURNISHED UNDER THIS PLAN OF TREATMENT AND WHILE UNDER MY CARE  Physician's comments:      Physician's Signature: ___________________________________________________          I certify the need for these services furnished under this plan of treatment and while under my care    ____________________________________  Physician/Referring Practitioner    _______________  Date of Signature

## 2022-02-11 NOTE — PROGRESS NOTES
"  Occupational Therapy Daily Treatment Note     Date: 2/14/2022  Name: Vick Gonzalez  Clinic Number: [id number]    Therapy Diagnosis:   Encounter Diagnoses   Name Primary?    Left hand weakness     Fine motor impairment     Loss of feeling or sensation      Physician: Jameson Alejo Jr., *     Physician Orders:   OT eval and treat.  Increase activities as tolerated  Medical Diagnosis: M79.642 (ICD-10-CM) - Left hand pain  Surgical Procedure and Date: 10/29 CTR with ulnar nerve release  Evaluation Date: 2/1/22  Insurance Authorization Period Expiration: 2/1/22-4/1/22  Plan of Care Expiration: 3/25/22  Date of Return to MD: 3/7/22    Visit # / Visits authorized: 2 / 12   Time In: 5:30  Time Out: 6:32  Total Billable Time: 52 minutes    Precautions:  Standard, Diabetes and organ transplant      Subjective     Pt reports: 2/14: Pt reports numbness of all fingers and thumb of his left hand. He reports constant numbness in his hand since the surgery in October. "Its like my hand isn't even there." Pt reports dropping items out of his left hand. He discussed concerns for having no improvement in symptoms since the surgery    Response to previous treatment: n/a    Pain: 5/10  Location: numbness in all fingers and thumb of left hand    Objective     Observation/Appearance: forward head posture, rounded shoulders, wasting in bilateral thenar eminences.      Edema. Measured in centimeters.    2/1/2022 2/1/2022     Left Right   2in. Above elbow 32 30   2in. Below elbow 30.5 29   Wrist Crease 18 18.8   Distal palmar crease 22.4 22.5      Edema. Measured in centimeters.    2/1/2022 2/1/2022     Left Right   Long:         P1 6.8 6.8   P2 6.0 6.0   Thumb:       Prox. Phalanx 7.1 7      Elbow and Wrist ROM.   Full     Hand ROM.   Full      Strength (Dynamometer) and Pinch Strength (Pinch Gauge)  Measured in pounds.    2/1/2022 2/1/2022     Left Right   Rung II 50 65   Key Pinch 9 17   3pt Pinch 5 13   2pt Pinch 4 7 " "        Sensation    2/1/2022 2/1/2022     Left Right   [place] Mendy       Normal 1.65-2.83       Diminished Light Touch 3.22-3.61       Diminished Protective 3.84-4.31 Radial dist All dist.   Loss of Protective 4.56-6.65       Untestable >6.65 Ulnar and median     2 Point Discrimination       Static Thumb, IF,LF, RF respond accurately to 6 pt discirmination. SF responds to 10 pt discrimination        IF, LF,RF, SF respond accurately to 6 pt discrimination.  Thumb responds accurately to 8 pt discrimination   Dynamic          Manual Muscle Test    2/1/2022 2/1/2022     Left Right   Wrist Extension  4- 4-   Wrist Flexion 4- 4-   Radial Deviation 4- 4   Ulnar Deviation 4- 4   Supination 4 4   Pronation 4 4   Elbow Extension 4- 4-   Elbow Flexion 4 3+   Lumbricals 3 4   Opponens 3 4   Adductor dig min 3- 5   PADs 3 5   DABs 3 5      Special tests.  froments sign               + left, a little on right.      Stereognosis testing of 7 objects included golf ball, playing card, pen, rubber band, giant paperclip, nut, and marble. He could only identify the marble, and in his right hand.     Dexterity Test:   2/14/22 2/14/22   timed Right Left   Nine Hole Peg 38" 1' 33"       Treatment     Vick received the following supervised modalities after being cleared for contraindications for 15 minutes:    --Fluidotherapy for promoting healing, reducing pain, desensitization and increasing tissue extensibility    Vick received the following manual therapy techniques for 0 minutes:     Vick performed therapeutic exercises for 27 minutes including:  -  Spreads, lifts, thumb opposition, and wrist AROM   Performed during the final minutes of fluido therapy    Intrinsic strengthening   Using yellow power web, 2 min each:  -Lifts  -Spreads    strengthening  With green t-putty 1 x 5 each:  -roll into log  -composite fist  -pancake press    Active flexor lengthening  2 x 30 seconds  - prayer hands stretch  - extrinsic flexor " stretch    Median Nerve Glides  5x each, seated in front of the mirror  -distal nerve glides  -proximal nerve glides   Assessed baseline fine motor coordination test 2/14/22   Assessed baseline 2 pt discrimination    2/14/22      Vick received Self Care instructions for 8 minutes and participated in an ongoing and concurrent discussion of re/evaluation findings and specific home care, including:  - education related to the tissue involvement as evidenced by MD diagnosis, positive response during provative movements and/or special testing.  - brief anatomy and pathophysiology of the tissue and peripheral nerve involvement.    Home Exercise Program and Home Care Resources/Education:     Education provided:   - HEP upgraded with green t-putty for strengthening, prayer hands stretch, and median nerve glides. Pt return demonstrated each exercise and provided with written instructions.  - Education provided on all interventions performed during today's session   - Progress towards goals     Written Home Exercises Provided: yes.  Exercises were reviewed and Vick was able to demonstrate them prior to the end of the session.  Vick demonstrated good  understanding of the HEP provided.   .   See EMR under Patient Instructions for exercises provided 2/14/2022.        Assessment   2/14: Ongoing abnormal sensations in the Left hand. Uncertain contribution from problems identified in CT scans conducted since the surgery was performed. Pt would continue to benefit from OT to maximize strength and address limitations in sensation and coordination with compensatory strategies that may include assistive devices.    Goals:  The following goals were discussed with the patient and patient is in agreement with them as to be addressed in the treatment plan.     Long Term Goals (LTGs); to be met by discharge.  LTG #1: Pt will demo improved FOTO score by 20 points.   LTG #2: Pt will return to prior level of function for ADLs and  "household management.   LTG #3: Pt will improve  strength in left hand by 10 lbs.     Short Term Goals (STGs); to be met within  weeks (3/25/22).  STG #1: Pt will report 3 out of 10 pain level with resting hand pain.  STG #2: Pt will report/demo key pinch of 12 lbs. Progressing  STG #3: Pt will report/demo Set-up Assistance with changing a lightbulb overhead.  STG #4: Pt will demonstrate independence with issued HEP. Progressing  STG #5: Pt will push up on hands with minimal difficulty.    Pt would continue to benefit from skilled OT.     Vick is progressing  towards his goals and there are no updates to goals at this time. Pt prognosis is Fair.     Pt will continue to benefit from skilled outpatient occupational therapy to address the deficits listed in the problem list on initial evaluation provide pt/family education and to maximize pt's level of independence in the home and community environment.     Pt's spiritual, cultural and educational needs considered and pt agreeable to plan of care and goals.    Plan   Acknowledge any new MD/PA orders. Update POC accordingly. Monitor HEP including median nerve glides, and upgrade tx with ulnar glides. Continue intrinsic and extrinsic mm strengthening.    Cont OT to address above goals as per original POC.    MANISHA Pierson  Student Occupational Therapist    "I, JOSUÉ Joyce CHT,  certify that I was present in the room directing the student in service delivery and guiding them using my skilled judgment. As the co-signing therapist, I have reviewed the student's documentation and am responsible for the treatment, assessment and plan."    JOSUÉ Joyce CHT  Occupational Therapist, Certified Hand Therapist  "

## 2022-02-14 ENCOUNTER — CLINICAL SUPPORT (OUTPATIENT)
Dept: REHABILITATION | Facility: HOSPITAL | Age: 67
End: 2022-02-14
Payer: MEDICARE

## 2022-02-14 DIAGNOSIS — R29.818 FINE MOTOR IMPAIRMENT: ICD-10-CM

## 2022-02-14 DIAGNOSIS — R29.898 FINE MOTOR IMPAIRMENT: ICD-10-CM

## 2022-02-14 DIAGNOSIS — R29.898 LEFT HAND WEAKNESS: ICD-10-CM

## 2022-02-14 DIAGNOSIS — R20.0 LOSS OF FEELING OR SENSATION: ICD-10-CM

## 2022-02-14 PROCEDURE — 97110 THERAPEUTIC EXERCISES: CPT | Mod: PN

## 2022-02-14 PROCEDURE — 97530 THERAPEUTIC ACTIVITIES: CPT | Mod: PN

## 2022-02-14 PROCEDURE — 97022 WHIRLPOOL THERAPY: CPT | Mod: PN

## 2022-02-15 PROBLEM — R29.898 FINE MOTOR IMPAIRMENT: Status: ACTIVE | Noted: 2022-02-14

## 2022-02-15 PROBLEM — R20.0 LOSS OF FEELING OR SENSATION: Status: ACTIVE | Noted: 2022-02-14

## 2022-02-15 PROBLEM — R29.818 FINE MOTOR IMPAIRMENT: Status: ACTIVE | Noted: 2022-02-14

## 2022-02-15 NOTE — PATIENT INSTRUCTIONS
[facility], [facility]APY  HOME EXERCISE PROGRAM         PERFORM EXERCISES 1 TO 3 SETS OF 10, 4-6 TIMES PER DAY; ALWAYS PAIN-FREE. DON'T LET THE PICTURES PRESSURE YOU TO PUSH THROUGH PAINFUL RANGES.      PERFORM STRENGTHENING EXERCISES EVERY OTHER DAY; AND ALWAYS PAIN-FREE

## 2022-02-16 ENCOUNTER — TELEPHONE (OUTPATIENT)
Dept: DIABETES | Facility: CLINIC | Age: 67
End: 2022-02-16
Payer: MEDICARE

## 2022-02-24 ENCOUNTER — HOSPITAL ENCOUNTER (EMERGENCY)
Facility: HOSPITAL | Age: 67
Discharge: HOME | End: 2022-02-24
Attending: EMERGENCY MEDICINE
Payer: MEDICARE

## 2022-02-24 VITALS
BODY MASS INDEX: 32.5 KG/M2 | TEMPERATURE: 99 F | SYSTOLIC BLOOD PRESSURE: 157 MMHG | OXYGEN SATURATION: 97 % | WEIGHT: 260 LBS | RESPIRATION RATE: 14 BRPM | DIASTOLIC BLOOD PRESSURE: 107 MMHG | HEART RATE: 84 BPM

## 2022-02-24 DIAGNOSIS — M25.539 WRIST PAIN: ICD-10-CM

## 2022-02-24 DIAGNOSIS — R10.9 ABDOMINAL PAIN: ICD-10-CM

## 2022-02-24 DIAGNOSIS — M25.519 SHOULDER PAIN: ICD-10-CM

## 2022-02-24 DIAGNOSIS — R10.13 EPIGASTRIC PAIN: Primary | ICD-10-CM

## 2022-02-24 LAB
ALBUMIN SERPL BCP-MCNC: 4 G/DL (ref 3.5–5.2)
ALP SERPL-CCNC: 93 U/L (ref 55–135)
ALT SERPL W/O P-5'-P-CCNC: 31 U/L (ref 10–44)
ANION GAP SERPL CALC-SCNC: 11 MMOL/L (ref 8–16)
AST SERPL-CCNC: 25 U/L (ref 10–40)
BACTERIA #/AREA URNS AUTO: ABNORMAL /HPF
BASOPHILS # BLD AUTO: 0.02 K/UL (ref 0–0.2)
BASOPHILS NFR BLD: 0.2 % (ref 0–1.9)
BILIRUB SERPL-MCNC: 0.5 MG/DL (ref 0.1–1)
BILIRUB UR QL STRIP: NEGATIVE
BUN SERPL-MCNC: 13 MG/DL (ref 8–23)
CALCIUM SERPL-MCNC: 9.6 MG/DL (ref 8.7–10.5)
CHLORIDE SERPL-SCNC: 103 MMOL/L (ref 95–110)
CLARITY UR REFRACT.AUTO: CLEAR
CO2 SERPL-SCNC: 26 MMOL/L (ref 23–29)
COLOR UR AUTO: YELLOW
CREAT SERPL-MCNC: 1.1 MG/DL (ref 0.5–1.4)
CTP QC/QA: YES
DIFFERENTIAL METHOD: ABNORMAL
EOSINOPHIL # BLD AUTO: 0.4 K/UL (ref 0–0.5)
EOSINOPHIL NFR BLD: 4.3 % (ref 0–8)
ERYTHROCYTE [DISTWIDTH] IN BLOOD BY AUTOMATED COUNT: 13 % (ref 11.5–14.5)
EST. GFR  (AFRICAN AMERICAN): >60 ML/MIN/1.73 M^2
EST. GFR  (NON AFRICAN AMERICAN): >60 ML/MIN/1.73 M^2
GLUCOSE SERPL-MCNC: 128 MG/DL (ref 70–110)
GLUCOSE UR QL STRIP: ABNORMAL
HCT VFR BLD AUTO: 39.6 % (ref 40–54)
HGB BLD-MCNC: 12.8 G/DL (ref 14–18)
HGB UR QL STRIP: NEGATIVE
HYALINE CASTS UR QL AUTO: 5 /LPF
IMM GRANULOCYTES # BLD AUTO: 0.07 K/UL (ref 0–0.04)
IMM GRANULOCYTES NFR BLD AUTO: 0.7 % (ref 0–0.5)
KETONES UR QL STRIP: NEGATIVE
LACTATE SERPL-SCNC: 1.6 MMOL/L (ref 0.5–2.2)
LEUKOCYTE ESTERASE UR QL STRIP: NEGATIVE
LIPASE SERPL-CCNC: 24 U/L (ref 4–60)
LYMPHOCYTES # BLD AUTO: 2.8 K/UL (ref 1–4.8)
LYMPHOCYTES NFR BLD: 28.8 % (ref 18–48)
MCH RBC QN AUTO: 29 PG (ref 27–31)
MCHC RBC AUTO-ENTMCNC: 32.3 G/DL (ref 32–36)
MCV RBC AUTO: 90 FL (ref 82–98)
MICROSCOPIC COMMENT: ABNORMAL
MONOCYTES # BLD AUTO: 0.9 K/UL (ref 0.3–1)
MONOCYTES NFR BLD: 9 % (ref 4–15)
NEUTROPHILS # BLD AUTO: 5.6 K/UL (ref 1.8–7.7)
NEUTROPHILS NFR BLD: 57 % (ref 38–73)
NITRITE UR QL STRIP: NEGATIVE
NRBC BLD-RTO: 0 /100 WBC
PH UR STRIP: 5 [PH] (ref 5–8)
PLATELET # BLD AUTO: 144 K/UL (ref 150–450)
PMV BLD AUTO: 11.8 FL (ref 9.2–12.9)
POTASSIUM SERPL-SCNC: 3.8 MMOL/L (ref 3.5–5.1)
PROT SERPL-MCNC: 7.9 G/DL (ref 6–8.4)
PROT UR QL STRIP: ABNORMAL
RBC # BLD AUTO: 4.42 M/UL (ref 4.6–6.2)
RBC #/AREA URNS AUTO: 1 /HPF (ref 0–4)
SARS-COV-2 RDRP RESP QL NAA+PROBE: NEGATIVE
SODIUM SERPL-SCNC: 140 MMOL/L (ref 136–145)
SP GR UR STRIP: 1.02 (ref 1–1.03)
SQUAMOUS #/AREA URNS AUTO: 2 /HPF
TROPONIN I SERPL DL<=0.01 NG/ML-MCNC: <0.006 NG/ML (ref 0–0.03)
URN SPEC COLLECT METH UR: ABNORMAL
WBC # BLD AUTO: 9.79 K/UL (ref 3.9–12.7)
WBC #/AREA URNS AUTO: 2 /HPF (ref 0–5)

## 2022-02-24 PROCEDURE — 25500020 PHARM REV CODE 255: Performed by: EMERGENCY MEDICINE

## 2022-02-24 PROCEDURE — 25000003 PHARM REV CODE 250: Performed by: PHYSICIAN ASSISTANT

## 2022-02-24 PROCEDURE — 99285 EMERGENCY DEPT VISIT HI MDM: CPT | Mod: CS,,, | Performed by: PHYSICIAN ASSISTANT

## 2022-02-24 PROCEDURE — 63600175 PHARM REV CODE 636 W HCPCS: Performed by: PHYSICIAN ASSISTANT

## 2022-02-24 PROCEDURE — 80053 COMPREHEN METABOLIC PANEL: CPT | Performed by: PHYSICIAN ASSISTANT

## 2022-02-24 PROCEDURE — U0002 COVID-19 LAB TEST NON-CDC: HCPCS | Performed by: PHYSICIAN ASSISTANT

## 2022-02-24 PROCEDURE — 85025 COMPLETE CBC W/AUTO DIFF WBC: CPT | Performed by: PHYSICIAN ASSISTANT

## 2022-02-24 PROCEDURE — 81001 URINALYSIS AUTO W/SCOPE: CPT | Performed by: PHYSICIAN ASSISTANT

## 2022-02-24 PROCEDURE — 84484 ASSAY OF TROPONIN QUANT: CPT | Performed by: PHYSICIAN ASSISTANT

## 2022-02-24 PROCEDURE — 96374 THER/PROPH/DIAG INJ IV PUSH: CPT

## 2022-02-24 PROCEDURE — 93005 ELECTROCARDIOGRAM TRACING: CPT

## 2022-02-24 PROCEDURE — 96361 HYDRATE IV INFUSION ADD-ON: CPT

## 2022-02-24 PROCEDURE — 93010 ELECTROCARDIOGRAM REPORT: CPT | Mod: ,,, | Performed by: INTERNAL MEDICINE

## 2022-02-24 PROCEDURE — 83690 ASSAY OF LIPASE: CPT | Performed by: PHYSICIAN ASSISTANT

## 2022-02-24 PROCEDURE — 99285 PR EMERGENCY DEPT VISIT,LEVEL V: ICD-10-PCS | Mod: CS,,, | Performed by: PHYSICIAN ASSISTANT

## 2022-02-24 PROCEDURE — 99285 EMERGENCY DEPT VISIT HI MDM: CPT | Mod: 25

## 2022-02-24 PROCEDURE — 93010 EKG 12-LEAD: ICD-10-PCS | Mod: ,,, | Performed by: INTERNAL MEDICINE

## 2022-02-24 PROCEDURE — 83605 ASSAY OF LACTIC ACID: CPT | Performed by: PHYSICIAN ASSISTANT

## 2022-02-24 RX ORDER — METHOCARBAMOL 500 MG/1
1000 TABLET, FILM COATED ORAL 3 TIMES DAILY PRN
Qty: 30 TABLET | Refills: 0 | Status: SHIPPED | OUTPATIENT
Start: 2022-02-24 | End: 2022-03-01

## 2022-02-24 RX ORDER — MAG HYDROX/ALUMINUM HYD/SIMETH 200-200-20
30 SUSPENSION, ORAL (FINAL DOSE FORM) ORAL
Qty: 769 ML | Refills: 0 | Status: SHIPPED | OUTPATIENT
Start: 2022-02-24

## 2022-02-24 RX ORDER — LIDOCAINE 50 MG/G
1 PATCH TOPICAL
Status: DISCONTINUED | OUTPATIENT
Start: 2022-02-24 | End: 2022-02-25 | Stop reason: HOSPADM

## 2022-02-24 RX ORDER — LIDOCAINE HYDROCHLORIDE 20 MG/ML
5 SOLUTION OROPHARYNGEAL
Status: COMPLETED | OUTPATIENT
Start: 2022-02-24 | End: 2022-02-24

## 2022-02-24 RX ORDER — MORPHINE SULFATE 2 MG/ML
6 INJECTION, SOLUTION INTRAMUSCULAR; INTRAVENOUS
Status: COMPLETED | OUTPATIENT
Start: 2022-02-24 | End: 2022-02-24

## 2022-02-24 RX ORDER — MAG HYDROX/ALUMINUM HYD/SIMETH 200-200-20
30 SUSPENSION, ORAL (FINAL DOSE FORM) ORAL
Status: COMPLETED | OUTPATIENT
Start: 2022-02-24 | End: 2022-02-24

## 2022-02-24 RX ORDER — ONDANSETRON 4 MG/1
4 TABLET, ORALLY DISINTEGRATING ORAL EVERY 6 HOURS PRN
Qty: 15 TABLET | Refills: 0 | Status: SHIPPED | OUTPATIENT
Start: 2022-02-24 | End: 2023-05-31

## 2022-02-24 RX ORDER — OXYCODONE AND ACETAMINOPHEN 5; 325 MG/1; MG/1
1 TABLET ORAL
Status: DISCONTINUED | OUTPATIENT
Start: 2022-02-24 | End: 2022-02-24

## 2022-02-24 RX ADMIN — MORPHINE SULFATE 6 MG: 2 INJECTION, SOLUTION INTRAMUSCULAR; INTRAVENOUS at 07:02

## 2022-02-24 RX ADMIN — LIDOCAINE HYDROCHLORIDE 5 ML: 20 SOLUTION ORAL; TOPICAL at 09:02

## 2022-02-24 RX ADMIN — LIDOCAINE 1 PATCH: 50 PATCH CUTANEOUS at 08:02

## 2022-02-24 RX ADMIN — IOHEXOL 100 ML: 350 INJECTION, SOLUTION INTRAVENOUS at 07:02

## 2022-02-24 RX ADMIN — ALUMINUM HYDROXIDE, MAGNESIUM HYDROXIDE, DIMETHICONE 30 ML: 200; 200; 20 SUSPENSION ORAL at 09:02

## 2022-02-25 NOTE — ED PROVIDER NOTES
"Encounter Date: 2/24/2022       History     Chief Complaint   Patient presents with    Abdominal Pain     Liver transplant in 2009; pain in URQ that started this morning; denies N/V; neck and left hand pain that started 5 months ago     66-year-old male with multiple comorbidities including history of liver transplant 2009, DM 2, CKD 3, hypertension presents for abdominal pain for 1 day.  Pain feels like "bloating" mostly in his upper abdomen, worse with bending over.  He denies any palliating factors.  He reports associated abdominal distension.  He denies any other complaints, no nausea/vomiting, changes in bowel movements urinary symptoms, chest pain, shortness of breath, fevers/chills or yellowing of skin or eyes.  He also is complaining of chronic pain in his left wrist radiating up to the left neck after a carpal tunnel release 5 months ago.  He has been taking gabapentin which is not improving his pain.  Pain seems to be worse at night.  Reports associated cramping of his hand.        Review of patient's allergies indicates:  No Known Allergies  Past Medical History:   Diagnosis Date    Anemia     Anxiety     Chronic pain syndrome 7/13/2011    CKD (chronic kidney disease), stage III     Diabetes mellitus type II, uncontrolled     Discitis of lumbosacral region 1/16/2015    ED (erectile dysfunction)     Encounter for blood transfusion     Genital herpes     Gout, arthritis     History of alcohol abuse     History of hepatitis C, s/p successful Rx w/ SVR24 (cure) - 5/2018 8/23/2011    Completed 24wks Epclusa + RBV w/SVR12 - 2/2018  -     History of positive PPD, treatment status unknown     Pulmonary granulomas, negative sputum cultures for AFB and indeterminate quantferon test    History of substance abuse     Hypertension     Hypothyroidism     Liver replaced by transplant 8/23/2011    DATE: 12/16/2013  LIVER BIOPSY : REASON:  hep C staging  PATHOLOGY COMMITTEE NOTE/PLAN: :grade  1 / stage 1 "        Pancreatitis 2016    Peptic ulcer disease      Past Surgical History:   Procedure Laterality Date    CARPAL TUNNEL RELEASE Left 10/29/2021    Procedure: RELEASE, CARPAL TUNNEL;  Surgeon: Jameson Alejo Jr., MD;  Location: [facility] OR;  Service: Orthopedics;  Laterality: Left;    CHOLECYSTECTOMY      INJECTION OF JOINT Right 12/2/2019    Procedure: INJECTION, JOINT SI;  Surgeon: Thu Penn MD;  Location: [facility] PAIN MGT;  Service: Pain Management;  Laterality: Right;  RT SI JNT INJ    LIVER TRANSPLANT  06/2010    SPINE SURGERY       Family History   Problem Relation Age of Onset    Cancer Mother     Diabetes Mother     Heart disease Mother     Diabetes Sister     Cancer Maternal Uncle 82        colon CA    Drug abuse Daughter     Melanoma Neg Hx     Psoriasis Neg Hx     Lupus Neg Hx     Eczema Neg Hx     Acne Neg Hx      Social History     Tobacco Use    Smoking status: Former Smoker    Smokeless tobacco: Never Used   Substance Use Topics    Alcohol use: No     Comment: over 5 years ago, none currently    Drug use: Not Currently     Comment: Former cocaine use     Review of Systems   Constitutional: Negative for fever.   HENT: Negative for sore throat.    Respiratory: Negative for shortness of breath.    Cardiovascular: Negative for chest pain.   Gastrointestinal: Positive for abdominal distention and abdominal pain. Negative for anal bleeding, blood in stool, constipation, diarrhea, nausea, rectal pain and vomiting.   Genitourinary: Negative for dysuria, flank pain and hematuria.   Musculoskeletal: Positive for arthralgias and neck pain. Negative for back pain, gait problem, joint swelling, myalgias and neck stiffness.   Skin: Negative for rash.   Allergic/Immunologic: Positive for immunocompromised state.   Neurological: Negative for weakness and numbness.   Hematological: Does not bruise/bleed easily.       Physical Exam     Initial Vitals [02/24/22 1711]   BP Pulse Resp Temp SpO2   (!)  173/90 (!) 112 (!) 22 99 °F (37.2 °C) 97 %      MAP       --         Physical Exam    Nursing note and vitals reviewed.  Constitutional: He appears well-developed and well-nourished. He is not diaphoretic. No distress.   HENT:   Head: Normocephalic and atraumatic.   Eyes: EOM are normal. Pupils are equal, round, and reactive to light.   Neck: Neck supple.   Normal range of motion.  Cardiovascular: Normal rate, regular rhythm, normal heart sounds and intact distal pulses. Exam reveals no gallop and no friction rub.    No murmur heard.  Pulmonary/Chest: Breath sounds normal. No respiratory distress. He has no wheezes. He has no rhonchi. He has no rales. He exhibits no tenderness.   Abdominal: Abdomen is soft. Bowel sounds are normal. He exhibits distension. He exhibits no mass. There is abdominal tenderness in the epigastric area and left lower quadrant. There is no rebound and no guarding.   Musculoskeletal:         General: Normal range of motion.      Right shoulder: Normal.      Left shoulder: Tenderness present. No bony tenderness. Normal range of motion.      Right upper arm: Normal.      Left upper arm: Normal.      Right elbow: Normal.      Left elbow: Normal.      Right forearm: Normal.      Left forearm: Normal.      Right wrist: Normal.      Left wrist: Tenderness present. No swelling, deformity, effusion or lacerations. Normal range of motion.      Right hand: Normal.      Left hand: Normal.      Cervical back: Normal range of motion and neck supple.      Comments: No posterior midline tenderness to palpation of C, T or L-spine.  There is some left-sided cervical paraspinous tenderness.  Tenderness over the palmar side of the wrist, symptoms provoked with tapping on median nerve     Neurological: He is alert and oriented to person, place, and time. He has normal strength. No sensory deficit.   Skin: Skin is warm and dry.   Psychiatric: He has a normal mood and affect.         ED Course   Procedures  Labs  Reviewed   CBC W/ AUTO DIFFERENTIAL - Abnormal; Notable for the following components:       Result Value    RBC 4.42 (*)     Hemoglobin 12.8 (*)     Hematocrit 39.6 (*)     Platelets 144 (*)     Immature Granulocytes 0.7 (*)     Immature Grans (Abs) 0.07 (*)     All other components within normal limits   COMPREHENSIVE METABOLIC PANEL - Abnormal; Notable for the following components:    Glucose 128 (*)     All other components within normal limits   URINALYSIS, REFLEX TO URINE CULTURE - Abnormal; Notable for the following components:    Protein, UA 1+ (*)     Glucose, UA 1+ (*)     All other components within normal limits    Narrative:     Specimen Source->Urine   URINALYSIS MICROSCOPIC - Abnormal; Notable for the following components:    Hyaline Casts, UA 5 (*)     All other components within normal limits    Narrative:     Specimen Source->Urine   LACTIC ACID, PLASMA   LIPASE   TROPONIN I   SARS-COV-2 RDRP GENE     EKG Readings: (Independently Interpreted)   Initial Reading: No STEMI. Previous EKG: Compared with most recent EKG Previous EKG Date: 10/11/2021. Rhythm: Normal Sinus Rhythm. Heart Rate: 92. Ectopy: No Ectopy. ST Segments: Non-Specific ST Segment Depression. ST Segment Depression: AVF and III. Clinical Impression: Normal Sinus Rhythm     ECG Results          Repeat EKG 12-lead (In process)  Result time 02/24/22 19:56:39    In process by Interface, Lab In [facility] (02/24/22 19:56:39)                 Narrative:    Test Reason : R10.13,    Vent. Rate : 094 BPM     Atrial Rate : 094 BPM     P-R Int : 164 ms          QRS Dur : 088 ms      QT Int : 348 ms       P-R-T Axes : 071 055 063 degrees     QTc Int : 435 ms    Age and gender specific analysis  Normal sinus rhythm  Septal infarct ,age undetermined  Abnormal ECG  When compared with ECG of 24-FEB-2022 19:09,  No significant change was found    Referred By: [email or web address]   SELF           Confirmed By:                              EKG 12-lead (In process)   Result time 02/24/22 19:56:31    In process by Interface, Lab In [facility] (02/24/22 19:56:31)                 Narrative:    Test Reason : R10.9,    Vent. Rate : 092 BPM     Atrial Rate : 092 BPM     P-R Int : 166 ms          QRS Dur : 084 ms      QT Int : 334 ms       P-R-T Axes : 068 047 055 degrees     QTc Int : 413 ms    Age and gender specific analysis  Normal sinus rhythm  Normal ECG  When compared with ECG of 11-OCT-2021 21:22,  No significant change was found    Referred By: [email or web address]   SELF           Confirmed By:                             Imaging Results          X-Ray Shoulder Trauma Left (Final result)  Result time 02/24/22 21:04:39    Final result by Brice Randle MD (02/24/22 21:04:39)                 Impression:      No acute fracture.      Electronically signed by: Brice Randle MD  Date:    02/24/2022  Time:    21:04             Narrative:    EXAMINATION:  XR SHOULDER TRAUMA 3 VIEW LEFT    CLINICAL HISTORY:  Pain in unspecified shoulder    TECHNIQUE:  Three views of the left shoulder were performed.    COMPARISON:  09/19/2014.    FINDINGS:  Bones: No fracture.  No lytic or blastic lesion.    Joints: No evidence for glenohumeral dislocation.  Acromioclavicular joint is unremarkable.    Soft tissues: Unremarkable.                               X-Ray Wrist Complete Left (Final result)  Result time 02/24/22 21:13:18    Final result by Brice Randle MD (02/24/22 21:13:18)                 Impression:      No acute fracture.    Degenerative changes at the greater multangular joint, similar to prior.    Soft tissue swelling left hand.      Electronically signed by: Brice Randle MD  Date:    02/24/2022  Time:    21:13             Narrative:    EXAMINATION:  XR WRIST COMPLETE 3 VIEWS LEFT    CLINICAL HISTORY:  Pain in unspecified wrist    TECHNIQUE:  PA, lateral, and oblique views of the left wrist were performed.    COMPARISON:  Left hand 07/13/2021.    FINDINGS:  No fracture or dislocation.   Degenerative change at the greater multangular joint, similar to prior.  Soft tissue swelling left hand.  Vascular calcifications present.                               CT Abdomen Pelvis With Contrast (Final result)  Result time 02/24/22 20:50:18    Final result by Brice Randle MD (02/24/22 20:50:18)                 Impression:      No acute abdominal abnormality.    Postoperative changes of liver transplant associated with stable ava hepatis fullness and prominent intrahepatic and extrahepatic ducts, similar to prior.    Right nephrolithiasis versus vascular calcifications, similar prior.    Findings in the right lung consistent with prior granulomatous disease, as above.    Stable airspace opacity within the medial aspect of the left lower lobe.    Additional findings as above.    Electronically signed by resident: Elisa Givens  Date:    02/24/2022  Time:    20:08    Electronically signed by: Brice Randle MD  Date:    02/24/2022  Time:    20:50             Narrative:    EXAMINATION:  CT ABDOMEN PELVIS WITH CONTRAST    CLINICAL HISTORY:  Epigastric pain;Abdominal pain, acute, nonlocalized;Hx liver tx, epigastric pain, LLq tenderness;    TECHNIQUE:  Axial images of the abdomen and pelvis were acquired  after the use of 100 cc Olyx149 IV contrast.  Coronal and sagittal reconstructions were also obtained    COMPARISON:  CT abdomen pelvis 11/11/2020    FINDINGS:  Heart: Normal in size. No pericardial effusion.  Multivessel coronary artery calcifications.    Lungs: Right lower lobe calcified granulomas and calcified lower right hilar nodes.  Airspace opacities within medial aspect of the left lower lobe, similar    Liver: Postoperative changes of liver transplant.  No focal hepatic lesion.  Redemonstration of ava hepatis fullness with prominence of the hepatic artery and common duct.  Portal vein appear patent.    Gallbladder: Surgically absent.    Bile Ducts: Prominent extrahepatic and intrahepatic  ducts without significant dilatation.    Pancreas: No mass or peripancreatic fat stranding.    Spleen: Calcified granulomas.    Stomach and duodenum: Unremarkable.    Adrenals: Unremarkable.    Kidneys/ Ureters: Normal in size and location. Right renal calcification measuring 0.5 cm, could be a stone or vascular, similar to prior.  No hydronephrosis or nephrolithiasis. No ureteral dilatation.    Bladder: No evidence of wall thickening.    Reproductive organs: Unremarkable.    Bowel/Mesentery: Small bowel is normal in caliber with no evidence of obstruction. No evidence of inflammation or wall thickening.  Colon demonstrates no focal wall thickening.  Appendix is unremarkable.    Peritoneum: No intraperitoneal free air or fluid    Lymph nodes: No retroperitoneal lymphadenopathy.    Vasculature: No aneurysm. Mild calcific atherosclerosis.    Abdominal wall:  Unremarkable.    Bones: Postoperative changes of L5-S1 fusion.  T12-L1 vacuum disc appear.  No acute fracture. No suspicious osseous lesions.                                 Medications   LIDOcaine 5 % patch 1 patch (1 patch Transdermal Patch Applied 2/24/22 2015)   lactated ringers bolus 1,000 mL (0 mLs Intravenous Stopped 2/24/22 2015)   morphine injection 6 mg (6 mg Intravenous Given 2/24/22 1926)   iohexoL (OMNIPAQUE 350) injection 100 mL (100 mLs Intravenous Given 2/24/22 1948)   aluminum-magnesium hydroxide-simethicone 200-200-20 mg/5 mL suspension 30 mL (30 mLs Oral Given 2/24/22 2115)   LIDOcaine HCl 2% oral solution 5 mL (5 mLs Oral Given 2/24/22 2115)     Medical Decision Making:   History:   Old Medical Records: I decided to obtain old medical records.  Old Records Summarized: records from clinic visits.       <> Summary of Records: Seen in orthopedic surgery clinic 01/24/2022 and was enrolled in physical therapy for chronic issues after carpal tunnel release.  Initial Assessment:   66-year-old male presenting for multiple complaints including  epigastric pain as well as chronic wrist pain and neck pain.  He is hypertensive 173/90, tachycardic with heart of 112 and tachypneic with respiratory rate of 22 with otherwise normal vitals.  Differential Diagnosis:   Pancreatitis  Gastritis  He also have some left lower quadrant tenderness, diverticulitis is a possibility  I doubt transplant rejection  I think cardiac cause is unlikely    I suspect his wrist and neck pain are due to diabetic neuropathy verses pain associated with carpal tunnel syndrome    Independently Interpreted Test(s):   I have ordered and independently interpreted X-rays - see summary below.       <> Summary of X-Ray Reading(s): No acute fracture  I have ordered and independently interpreted EKG Reading(s) - see prior notes  Clinical Tests:   Lab Tests: Ordered and Reviewed  Radiological Study: Ordered and Reviewed  Medical Tests: Ordered and Reviewed  ED Management:  Will check labs, EKG give morphine for pain, do CT abdomen pelvis, x-rays and reassess.    Workup is reassuring, CT without any acute findings.  Patient reports relief of symptoms after therapy.  He is tolerating p.o. intake.  I discussed with him that think his pain is related to his known nerve issues but will prescribe a muscle relaxant given the spasming in his hand.  Will prescribe Maalox and Zofran for abdominal discomfort.  Will place referral to pain management and instruct him to follow up with Hand surgery.  He would like to get a 2nd opinion from another hand surgeon, referral information provided.  I instructed him follow up with PCP and seen surgery and return to the ED for any worsening symptoms. Stressed the importance of follow-up, strict ED return precautions given.  Patient voiced understanding and is comfortable with discharge. I discussed this patient with my supervising physician.              Attending Attestation:     Physician Attestation Statement for NP/PA:   I discussed this assessment and plan of this  patient with the NP/PA, but I did not personally examine the patient. The face to face encounter was performed by the NP/PA.              ED Course as of 02/24/22 2218   Thu Feb 24, 2022 2021 Platelets(!): 144  Very mild thrombocytopenia, similar when compared to prior [CC]   2021 Lipase: 24 [CC]   2021 Troponin I: <0.006 [CC]   2021 BILIRUBIN TOTAL: 0.5 [CC]      ED Course User Index  [CC] Elisha Ying PA-C             Clinical Impression:   Final diagnoses:  [R10.9] Abdominal pain  [R10.13] Epigastric pain (Primary)  [M25.539] Wrist pain  [M25.519] Shoulder pain          ED Disposition Condition    Discharge Stable        ED Prescriptions     Medication Sig Dispense Start Date End Date Auth. Provider    methocarbamoL (ROBAXIN) 500 MG Tab Take 2 tablets (1,000 mg total) by mouth 3 (three) times daily as needed (muscle pain/ spasm). 30 tablet 2/24/2022 3/1/2022 Elisha Ying PA-C    aluminum-magnesium hydroxide-simethicone (MAALOX) 200-200-20 mg/5 mL Susp Take 30 mLs by mouth 4 (four) times daily before meals and nightly. 769 mL 2/24/2022 2/24/2023 Elisha Ying PA-C    ondansetron (ZOFRAN-ODT) 4 MG TbDL Take 1 tablet (4 mg total) by mouth every 6 (six) hours as needed (Nausea). 15 tablet 2/24/2022  Elisha Ying PA-C        Follow-up Information     Follow up With Specialties Details Why Contact Info    [facility] PAIN [facility]NAG[facility] Pain Medicine Schedule an appointment as soon as possible for a visit in 1 week  [address] [address] [phone]    Stephanie Martínez MD Hand Surgery, Orthopedic Surgery Schedule an appointment as soon as possible for a visit in 1 week  [address]  [address]  [facility] [facility] [address]  [phone]      [facility] - Emergency Dept Emergency Medicine Go to  If symptoms worsen [address] [address] [phone]           Elisha Ying PA-C  02/24/22 2218       Kamilla COOL  MD Jacqueline  02/24/22 0684

## 2022-02-25 NOTE — PROVIDER PROGRESS NOTES - EMERGENCY DEPT.
Encounter Date: 2/24/2022    ED Physician Progress Notes         EKG - STEMI Decision  Initial Reading: No STEMI present.

## 2022-02-25 NOTE — DISCHARGE INSTRUCTIONS
Diagnosis:  Abdominal pain, carpal tunnel syndrome    I suspect that your abdominal pain may be related to irritation of your stomach or gastritis.  Your lab tests and CT scan did not show any concerning findings.  I am prescribing medicine to take as needed for this and nausea.  Think that your wrist and neck pain or due to nerve issues.  I prescribing a muscle relaxant to help with the hand spasms. You should take Tylenol as needed for pain up to 3 grams daily which is 6 of the 500 mg extra strength tablets.    I placed a referral to our pain management doctors for follow-up.  Please follow-up with them as well as your hand surgeon.  If you start to have any worsening symptoms, please return to the emergency department.

## 2022-03-03 ENCOUNTER — PATIENT OUTREACH (OUTPATIENT)
Dept: ADMINISTRATIVE | Facility: OTHER | Age: 67
End: 2022-03-03
Payer: MEDICARE

## 2022-03-03 NOTE — TELEPHONE ENCOUNTER
Care Due:                  Date            Visit Type   Department     Provider  --------------------------------------------------------------------------------                                EP -                              PRIMARY      [facility] FAMILY  Last Visit: 01-      CARE ([facility])   MEDICINE       Emanuel Lopez  Next Visit: None Scheduled  None         None Found                                                            Last  Test          Frequency    Reason                     Performed    Due Date  --------------------------------------------------------------------------------    Uric Acid...  12 months..  allopurinoL..............  Not Found    Overdue    Powered by [employer]. Reference number: [id number].   3/03/2022 12:40:41 PM CST

## 2022-03-04 NOTE — PROGRESS NOTES
Health Maintenance Due   Topic Date Due    Complete Opioid Risk Tool  Never done    COVID-19 Vaccine (3 - Booster for Pfizer series) 08/26/2021    Urine Drug Screen  09/03/2021    Eye Exam  12/08/2021     Updates were requested from care everywhere.  Chart was reviewed for overdue [facility] Encounters ([facility]) topics (CRS, Breast Cancer Screening, Eye exam)  Health Maintenance has been updated.  LINKS immunization registry triggered.  Immunizations were reconciled.

## 2022-03-08 RX ORDER — ROSUVASTATIN CALCIUM 5 MG/1
TABLET, COATED ORAL
Qty: 90 TABLET | Refills: 11 | Status: SHIPPED | OUTPATIENT
Start: 2022-03-08 | End: 2023-05-03

## 2022-03-08 RX ORDER — NIFEDIPINE 60 MG/1
TABLET, EXTENDED RELEASE ORAL
Qty: 90 TABLET | Refills: 11 | Status: ON HOLD | OUTPATIENT
Start: 2022-03-08 | End: 2022-08-26 | Stop reason: HOSPADM

## 2022-03-25 ENCOUNTER — HOSPITAL ENCOUNTER (OUTPATIENT)
Dept: RADIOLOGY | Facility: HOSPITAL | Age: 67
Discharge: HOME | End: 2022-03-25
Attending: INTERNAL MEDICINE
Payer: MEDICARE

## 2022-03-25 DIAGNOSIS — Z94.4 LIVER REPLACED BY TRANSPLANT: ICD-10-CM

## 2022-03-25 PROCEDURE — 76705 ECHO EXAM OF ABDOMEN: CPT | Mod: 59,TC

## 2022-03-25 PROCEDURE — 93976 VASCULAR STUDY: CPT | Mod: 26,,, | Performed by: RADIOLOGY

## 2022-03-25 PROCEDURE — 76705 ECHO EXAM OF ABDOMEN: CPT | Mod: 26,XS,, | Performed by: RADIOLOGY

## 2022-03-25 PROCEDURE — 93976 VASCULAR STUDY: CPT | Mod: TC

## 2022-03-25 PROCEDURE — 93976 US LIVER TRANSPLANT POST: ICD-10-PCS | Mod: 26,,, | Performed by: RADIOLOGY

## 2022-03-25 PROCEDURE — 76705 US LIVER TRANSPLANT POST: ICD-10-PCS | Mod: 26,XS,, | Performed by: RADIOLOGY

## 2022-03-29 ENCOUNTER — TELEPHONE (OUTPATIENT)
Dept: TRANSPLANT | Facility: CLINIC | Age: 67
End: 2022-03-29
Payer: MEDICARE

## 2022-03-29 DIAGNOSIS — Z94.4 S/P LIVER TRANSPLANT: Primary | ICD-10-CM

## 2022-03-29 NOTE — LETTER
March 29, 2022    Vick Ray Carlos  [address] [address]             [facility] 1st Fl  [address]  [place] [address]  Phone: [phone] Mr. Gonzalez,    Dr. Coleman reviewed your ultrasound. He said it is stable. We will have you repeat another ultrasound in 6 months - 9/2022.    Please call us with any questions or concerns.    Sincerely,    Kandy Herrera, RN, BSN, CCTC  Sr Liver Transplant Coordinator

## 2022-03-29 NOTE — TELEPHONE ENCOUNTER
----- Message from Jasmyne Schneider sent at 9/18/2017  2:26 PM CDT -----  Patient's wife, Cely Gonzalez, called.    No. [phone]   Patient is scheduled for a 4 week follow up on 9/27/17.   Please call with the reason for the follow up.      Calm

## 2022-03-29 NOTE — TELEPHONE ENCOUNTER
Letter sent, ultrasound stable  ----- Message from James Coleman MD sent at 3/28/2022 10:47 AM CDT -----  Results reviewed

## 2022-04-13 ENCOUNTER — OFFICE VISIT (OUTPATIENT)
Dept: FAMILY MEDICINE | Facility: CLINIC | Age: 67
End: 2022-04-13
Attending: FAMILY MEDICINE
Payer: MEDICARE

## 2022-04-13 ENCOUNTER — HOSPITAL ENCOUNTER (OUTPATIENT)
Dept: RADIOLOGY | Facility: HOSPITAL | Age: 67
Discharge: HOME | End: 2022-04-13
Attending: FAMILY MEDICINE
Payer: MEDICARE

## 2022-04-13 VITALS
HEIGHT: 75 IN | DIASTOLIC BLOOD PRESSURE: 64 MMHG | OXYGEN SATURATION: 98 % | BODY MASS INDEX: 32.1 KG/M2 | HEART RATE: 76 BPM | WEIGHT: 258.19 LBS | SYSTOLIC BLOOD PRESSURE: 126 MMHG

## 2022-04-13 DIAGNOSIS — R05.9 COUGH: Primary | ICD-10-CM

## 2022-04-13 DIAGNOSIS — R06.2 WHEEZING: ICD-10-CM

## 2022-04-13 DIAGNOSIS — R05.9 COUGH: ICD-10-CM

## 2022-04-13 DIAGNOSIS — R06.02 SOB (SHORTNESS OF BREATH): ICD-10-CM

## 2022-04-13 PROCEDURE — 3008F PR BODY MASS INDEX (BMI) DOCUMENTED: ICD-10-PCS | Mod: CPTII,S$GLB,, | Performed by: FAMILY MEDICINE

## 2022-04-13 PROCEDURE — 99214 OFFICE O/P EST MOD 30 MIN: CPT | Mod: 25,S$GLB,, | Performed by: FAMILY MEDICINE

## 2022-04-13 PROCEDURE — 3074F SYST BP LT 130 MM HG: CPT | Mod: CPTII,S$GLB,, | Performed by: FAMILY MEDICINE

## 2022-04-13 PROCEDURE — 4010F PR ACE/ARB THEARPY RXD/TAKEN: ICD-10-PCS | Mod: CPTII,S$GLB,, | Performed by: FAMILY MEDICINE

## 2022-04-13 PROCEDURE — 1159F PR MEDICATION LIST DOCUMENTED IN MEDICAL RECORD: ICD-10-PCS | Mod: CPTII,S$GLB,, | Performed by: FAMILY MEDICINE

## 2022-04-13 PROCEDURE — 3078F PR MOST RECENT DIASTOLIC BLOOD PRESSURE < 80 MM HG: ICD-10-PCS | Mod: CPTII,S$GLB,, | Performed by: FAMILY MEDICINE

## 2022-04-13 PROCEDURE — 99214 PR OFFICE/OUTPT VISIT, EST, LEVL IV, 30-39 MIN: ICD-10-PCS | Mod: 25,S$GLB,, | Performed by: FAMILY MEDICINE

## 2022-04-13 PROCEDURE — 1160F PR REVIEW ALL MEDS BY PRESCRIBER/CLIN PHARMACIST DOCUMENTED: ICD-10-PCS | Mod: CPTII,S$GLB,, | Performed by: FAMILY MEDICINE

## 2022-04-13 PROCEDURE — 71046 X-RAY EXAM CHEST 2 VIEWS: CPT | Mod: 26,,, | Performed by: RADIOLOGY

## 2022-04-13 PROCEDURE — 3060F PR POS MICROALBUMINURIA RESULT DOCUMENTED/REVIEW: ICD-10-PCS | Mod: CPTII,S$GLB,, | Performed by: FAMILY MEDICINE

## 2022-04-13 PROCEDURE — 1159F MED LIST DOCD IN RCRD: CPT | Mod: CPTII,S$GLB,, | Performed by: FAMILY MEDICINE

## 2022-04-13 PROCEDURE — 3078F DIAST BP <80 MM HG: CPT | Mod: CPTII,S$GLB,, | Performed by: FAMILY MEDICINE

## 2022-04-13 PROCEDURE — 3051F HG A1C>EQUAL 7.0%<8.0%: CPT | Mod: CPTII,S$GLB,, | Performed by: FAMILY MEDICINE

## 2022-04-13 PROCEDURE — 99999 PR PBB SHADOW E&M-EST. PATIENT-LVL III: CPT | Mod: PBBFAC,,, | Performed by: FAMILY MEDICINE

## 2022-04-13 PROCEDURE — 71046 XR CHEST PA AND LATERAL: ICD-10-PCS | Mod: 26,,, | Performed by: RADIOLOGY

## 2022-04-13 PROCEDURE — 94640 PR INHAL RX, AIRWAY OBST/DX SPUTUM INDUCT: ICD-10-PCS | Mod: S$GLB,,, | Performed by: FAMILY MEDICINE

## 2022-04-13 PROCEDURE — 99999 PR PBB SHADOW E&M-EST. PATIENT-LVL III: ICD-10-PCS | Mod: PBBFAC,,, | Performed by: FAMILY MEDICINE

## 2022-04-13 PROCEDURE — 1160F RVW MEDS BY RX/DR IN RCRD: CPT | Mod: CPTII,S$GLB,, | Performed by: FAMILY MEDICINE

## 2022-04-13 PROCEDURE — 3066F PR DOCUMENTATION OF TREATMENT FOR NEPHROPATHY: ICD-10-PCS | Mod: CPTII,S$GLB,, | Performed by: FAMILY MEDICINE

## 2022-04-13 PROCEDURE — 3074F PR MOST RECENT SYSTOLIC BLOOD PRESSURE < 130 MM HG: ICD-10-PCS | Mod: CPTII,S$GLB,, | Performed by: FAMILY MEDICINE

## 2022-04-13 PROCEDURE — 3066F NEPHROPATHY DOC TX: CPT | Mod: CPTII,S$GLB,, | Performed by: FAMILY MEDICINE

## 2022-04-13 PROCEDURE — 4010F ACE/ARB THERAPY RXD/TAKEN: CPT | Mod: CPTII,S$GLB,, | Performed by: FAMILY MEDICINE

## 2022-04-13 PROCEDURE — 3060F POS MICROALBUMINURIA REV: CPT | Mod: CPTII,S$GLB,, | Performed by: FAMILY MEDICINE

## 2022-04-13 PROCEDURE — 3051F PR MOST RECENT HEMOGLOBIN A1C LEVEL 7.0 - < 8.0%: ICD-10-PCS | Mod: CPTII,S$GLB,, | Performed by: FAMILY MEDICINE

## 2022-04-13 PROCEDURE — 71046 X-RAY EXAM CHEST 2 VIEWS: CPT | Mod: TC,FY

## 2022-04-13 PROCEDURE — 3008F BODY MASS INDEX DOCD: CPT | Mod: CPTII,S$GLB,, | Performed by: FAMILY MEDICINE

## 2022-04-13 PROCEDURE — 94640 AIRWAY INHALATION TREATMENT: CPT | Mod: S$GLB,,, | Performed by: FAMILY MEDICINE

## 2022-04-13 RX ORDER — IPRATROPIUM BROMIDE AND ALBUTEROL SULFATE 2.5; .5 MG/3ML; MG/3ML
3 SOLUTION RESPIRATORY (INHALATION)
Status: COMPLETED | OUTPATIENT
Start: 2022-04-13 | End: 2022-04-13

## 2022-04-13 RX ORDER — PROMETHAZINE HYDROCHLORIDE AND DEXTROMETHORPHAN HYDROBROMIDE 6.25; 15 MG/5ML; MG/5ML
5 SYRUP ORAL 2 TIMES DAILY PRN
Qty: 180 ML | Refills: 1 | Status: SHIPPED | OUTPATIENT
Start: 2022-04-13 | End: 2022-05-01

## 2022-04-13 RX ORDER — ALBUTEROL SULFATE 90 UG/1
2 AEROSOL, METERED RESPIRATORY (INHALATION) EVERY 6 HOURS PRN
Qty: 8.5 G | Refills: 1 | Status: SHIPPED | OUTPATIENT
Start: 2022-04-13 | End: 2022-11-01 | Stop reason: SDUPTHER

## 2022-04-13 RX ADMIN — IPRATROPIUM BROMIDE AND ALBUTEROL SULFATE 3 ML: 2.5; .5 SOLUTION RESPIRATORY (INHALATION) at 02:04

## 2022-04-21 NOTE — TELEPHONE ENCOUNTER
----- Message from Madison White sent at 4/21/2022  4:49 PM CDT -----  Regarding: Diabetic Supplies  Type:  Diabetic/Medical Supplies Request    Name of Caller: Winnie    What supplies are needed: lancets and testing strips    What is the brand of the supplies: n/a    Refill or New Rx: New     Pharmacy/Company Name, Phone #, Location: [facility]    Would the patient rather a call back or a response via [facility]? Call [phone]    Best Call Back Number: [phone]

## 2022-04-21 NOTE — TELEPHONE ENCOUNTER
No new care gaps identified.  Powered by [employer]. Reference number: [id number].   4/21/2022 4:57:37 PM CDT

## 2022-04-22 RX ORDER — BLOOD-GLUCOSE CONTROL, NORMAL
1 EACH MISCELLANEOUS
Qty: 200 EACH | Refills: 11 | Status: SHIPPED | OUTPATIENT
Start: 2022-04-22

## 2022-04-25 ENCOUNTER — TELEPHONE (OUTPATIENT)
Dept: FAMILY MEDICINE | Facility: CLINIC | Age: 67
End: 2022-04-25
Payer: MEDICARE

## 2022-04-25 NOTE — TELEPHONE ENCOUNTER
----- Message from Amber Choi sent at 4/25/2022  9:04 AM CDT -----  Type: Requesting to speak with nurse         Who Called: PT's somebody   Regarding: Patient needing testing strips to go to [facility] NOT [facility] --- please advise   Would the patient rather a call back or a response via [facility]? Call back  Best Call Back Number: [phone]  Additional Information: n/a

## 2022-04-25 NOTE — TELEPHONE ENCOUNTER
----- Message from Danitza Andrews sent at 4/25/2022 12:16 PM CDT -----  Type:  Needs Medical Advice    Who Called: pt  Symptoms (please be specific): pt needs prescription to be faxed to [facility] his test stripes for his diabetic    Would the patient rather a call back or a response via [facility]? call  Best Call Back Number: [phone]  Additional Information: [facility] number-[phone]

## 2022-04-25 NOTE — TELEPHONE ENCOUNTER
Returned patient's call. I let him know that I had faxed the order for his test strips and the office notes to [facility]. Patient verbalized understanding.

## 2022-04-28 ENCOUNTER — PATIENT OUTREACH (OUTPATIENT)
Dept: ADMINISTRATIVE | Facility: OTHER | Age: 67
End: 2022-04-28
Payer: MEDICARE

## 2022-04-28 NOTE — PROGRESS NOTES
Health Maintenance Due   Topic Date Due    COVID-19 Vaccine (3 - Booster for Pfizer series) 08/26/2021    Eye Exam  12/08/2021     Updates were requested from care everywhere.  Chart was reviewed for overdue [facility] Encounters ([facility]) topics (CRS, Breast Cancer Screening, Eye exam)  Health Maintenance has been updated.  LINKS immunization registry triggered.  Immunizations were reconciled.

## 2022-05-02 ENCOUNTER — OFFICE VISIT (OUTPATIENT)
Dept: ORTHOPEDICS | Facility: CLINIC | Age: 67
End: 2022-05-02
Payer: MEDICARE

## 2022-05-02 VITALS — WEIGHT: 258.19 LBS | HEIGHT: 75 IN | BODY MASS INDEX: 32.1 KG/M2

## 2022-05-02 DIAGNOSIS — E11.42 DIABETIC POLYNEUROPATHY ASSOCIATED WITH TYPE 2 DIABETES MELLITUS: ICD-10-CM

## 2022-05-02 DIAGNOSIS — G56.02 CARPAL TUNNEL SYNDROME OF LEFT WRIST: Primary | ICD-10-CM

## 2022-05-02 PROCEDURE — 3051F PR MOST RECENT HEMOGLOBIN A1C LEVEL 7.0 - < 8.0%: ICD-10-PCS | Mod: CPTII,S$GLB,, | Performed by: ORTHOPAEDIC SURGERY

## 2022-05-02 PROCEDURE — 4010F PR ACE/ARB THEARPY RXD/TAKEN: ICD-10-PCS | Mod: CPTII,S$GLB,, | Performed by: ORTHOPAEDIC SURGERY

## 2022-05-02 PROCEDURE — 3008F PR BODY MASS INDEX (BMI) DOCUMENTED: ICD-10-PCS | Mod: CPTII,S$GLB,, | Performed by: ORTHOPAEDIC SURGERY

## 2022-05-02 PROCEDURE — 99999 PR PBB SHADOW E&M-EST. PATIENT-LVL IV: CPT | Mod: PBBFAC,,, | Performed by: ORTHOPAEDIC SURGERY

## 2022-05-02 PROCEDURE — 99999 PR PBB SHADOW E&M-EST. PATIENT-LVL IV: ICD-10-PCS | Mod: PBBFAC,,, | Performed by: ORTHOPAEDIC SURGERY

## 2022-05-02 PROCEDURE — 99213 PR OFFICE/OUTPT VISIT, EST, LEVL III, 20-29 MIN: ICD-10-PCS | Mod: S$GLB,,, | Performed by: ORTHOPAEDIC SURGERY

## 2022-05-02 PROCEDURE — 3051F HG A1C>EQUAL 7.0%<8.0%: CPT | Mod: CPTII,S$GLB,, | Performed by: ORTHOPAEDIC SURGERY

## 2022-05-02 PROCEDURE — 4010F ACE/ARB THERAPY RXD/TAKEN: CPT | Mod: CPTII,S$GLB,, | Performed by: ORTHOPAEDIC SURGERY

## 2022-05-02 PROCEDURE — 1159F PR MEDICATION LIST DOCUMENTED IN MEDICAL RECORD: ICD-10-PCS | Mod: CPTII,S$GLB,, | Performed by: ORTHOPAEDIC SURGERY

## 2022-05-02 PROCEDURE — 3066F PR DOCUMENTATION OF TREATMENT FOR NEPHROPATHY: ICD-10-PCS | Mod: CPTII,S$GLB,, | Performed by: ORTHOPAEDIC SURGERY

## 2022-05-02 PROCEDURE — 99213 OFFICE O/P EST LOW 20 MIN: CPT | Mod: S$GLB,,, | Performed by: ORTHOPAEDIC SURGERY

## 2022-05-02 PROCEDURE — 1159F MED LIST DOCD IN RCRD: CPT | Mod: CPTII,S$GLB,, | Performed by: ORTHOPAEDIC SURGERY

## 2022-05-02 PROCEDURE — 3066F NEPHROPATHY DOC TX: CPT | Mod: CPTII,S$GLB,, | Performed by: ORTHOPAEDIC SURGERY

## 2022-05-02 PROCEDURE — 3060F POS MICROALBUMINURIA REV: CPT | Mod: CPTII,S$GLB,, | Performed by: ORTHOPAEDIC SURGERY

## 2022-05-02 PROCEDURE — 3060F PR POS MICROALBUMINURIA RESULT DOCUMENTED/REVIEW: ICD-10-PCS | Mod: CPTII,S$GLB,, | Performed by: ORTHOPAEDIC SURGERY

## 2022-05-02 PROCEDURE — 3008F BODY MASS INDEX DOCD: CPT | Mod: CPTII,S$GLB,, | Performed by: ORTHOPAEDIC SURGERY

## 2022-05-02 RX ORDER — TRAMADOL HYDROCHLORIDE 50 MG/1
50 TABLET ORAL EVERY 4 HOURS PRN
Qty: 30 TABLET | Refills: 0 | Status: SHIPPED | OUTPATIENT
Start: 2022-05-02 | End: 2022-05-12

## 2022-05-02 NOTE — PROGRESS NOTES
"Subjective:      Patient ID: Vick Gonzalez is a 66 y.o. male.  Chief Complaint: Hand Pain (left )      HPI  Vick Gonzalez is a  66 y.o. male presenting today for follow up of left carpal tunnel release and ulnar nerve release left elbow now about 6 months postop.  He reports that he is having worsening symptoms reports increasing pain and stiffness and numbness in the left hand  He did complete physical therapy.    Review of patient's allergies indicates:  No Known Allergies      Current Outpatient Medications   Medication Sig Dispense Refill    allopurinoL (ZYLOPRIM) 100 MG tablet TAKE 1 TABLET BY MOUTH TWICE A DAY (Patient taking differently: Take 100 mg by mouth 2 (two) times a day.) 60 tablet 7    BD ULTRA-FINE MENDY PEN NEEDLES 32 gauge x 5/32" Ndle       blood sugar diagnostic (TRUE METRIX GLUCOSE TEST STRIP) Strp USE 3 TIMES DAILY TO TEST BLOOD GLUCOSE LEVEL 100 strip 11    calcium carbonate-vitamin D3 (CALCIUM 600 WITH VITAMIN D3) 600 mg(1,500mg) -400 unit Chew Take 1 tablet by mouth once daily.       flash glucose sensor (FREESTYLE PALLAVI 2 SENSOR) Kit One sensor every 14 days 2 kit prn    gabapentin (NEURONTIN) 800 MG tablet TAKE 1 TABLET BY MOUTH THREE TIMES A DAY 90 tablet 3    glucose 4 GM chewable tablet Take 4 tablets (16 g total) by mouth as needed for Low blood sugar (If having symptoms of blurry vision, palpitations, confusion, shakiness.  Please check sugars and if sugar below 70 please take 4 tablets and re-check sugar everry 15 minutes until sugars are above 70 and symptoms resolve.). 50 tablet 12    insulin glargine U-300 conc (TOUJEO MAX U-300 SOLOSTAR) 300 unit/mL (3 mL) insulin pen Inject 40 Units into the skin once daily. 3 mL 11    insulin lispro 100 unit/mL pen Inject 20 Units into the skin 3 (three) times daily with meals. 15 mL 11    lancets 30 gauge Misc 1 lancet by Misc.(Non-Drug; Combo Route) route 4 (four) times daily before meals and nightly. 200 each 11    " "levothyroxine (SYNTHROID) 88 MCG tablet TAKE 1 TABLET BY MOUTH EVERY DAY (Patient taking differently: Take 88 mcg by mouth before breakfast.) 90 tablet 4    lisinopriL (PRINIVIL,ZESTRIL) 40 MG tablet Take 1 tablet (40 mg total) by mouth once daily. 90 tablet 3    metoprolol succinate (TOPROL-XL) 200 MG 24 hr tablet TAKE 1 TABLET BY MOUTH EVERY DAY (Patient taking differently: Take 200 mg by mouth once daily.) 90 tablet 3    multivitamin (THERAGRAN) per tablet Take 1 tablet by mouth once daily.       NIFEdipine (ADALAT CC) 60 MG TbSR TAKE 1 TABLET BY MOUTH EVERY DAY 90 tablet 11    NOVOFINE PLUS 32 gauge x 1/6" Ndle       pen needle, diabetic (BD ULTRA-FINE MENDY PEN NEEDLE) 32 gauge x 5/32" Ndle TEST WITH NOVOLOG THREE TIMES A DAY WITH MEALS AND WITH LEVEMIR AT BEDTIME 100 each 11    rosuvastatin (CRESTOR) 5 MG tablet TAKE 1 TABLET BY MOUTH EVERY DAY 90 tablet 11    sertraline (ZOLOFT) 100 MG tablet TAKE 1 TABLET (100 MG TOTAL) BY MOUTH ONCE DAILY. 30 tablet 7    tacrolimus (PROGRAF) 1 MG Cap TAKE 2 CAPSULES (2 MG TOTAL) BY MOUTH IN THE MORNING & TAKE 1 CAPSULE (1 MG TOTAL) IN THE EVENING. 90 capsule 11    traZODone (DESYREL) 50 MG tablet TAKE 1 TABLET (50 MG TOTAL) BY MOUTH EVERY EVENING. 30 tablet 11    TRUE METRIX GLUCOSE METER Misc TEST 4 (FOUR) TIMES DAILY BEFORE MEALS AND NIGHTLY. 1 each 0    albuterol (VENTOLIN HFA) 90 mcg/actuation inhaler Inhale 2 puffs into the lungs every 6 (six) hours as needed for Wheezing or Shortness of Breath. Rescue (Patient not taking: Reported on 5/2/2022) 8.5 g 1    aluminum-magnesium hydroxide-simethicone (MAALOX) 200-200-20 mg/5 mL Susp Take 30 mLs by mouth 4 (four) times daily before meals and nightly. (Patient not taking: Reported on 5/2/2022) 769 mL 0    aspirin 81 MG Chew Take 1 tablet (81 mg total) by mouth once daily. 90 tablet 3    glucose 4 GM chewable tablet Take 4 tablets (16 g total) by mouth as needed for Low blood sugar. (Patient not taking: Reported " "on 5/2/2022)  12    HYDROcodone-acetaminophen (NORCO) 5-325 mg per tablet       ondansetron (ZOFRAN-ODT) 4 MG TbDL Take 1 tablet (4 mg total) by mouth every 6 (six) hours as needed (Nausea). (Patient not taking: Reported on 5/2/2022) 15 tablet 0    traMADoL (ULTRAM) 50 mg tablet Take 1 tablet (50 mg total) by mouth every 4 (four) hours as needed for Pain. 30 tablet 0     No current facility-administered medications for this visit.       Past Medical History:   Diagnosis Date    Anemia     Anxiety     Chronic pain syndrome 7/13/2011    CKD (chronic kidney disease), stage III     Diabetes mellitus type II, uncontrolled     Discitis of lumbosacral region 1/16/2015    ED (erectile dysfunction)     Encounter for blood transfusion     Genital herpes     Gout, arthritis     History of alcohol abuse     History of hepatitis C, s/p successful Rx w/ SVR24 (cure) - 5/2018 8/23/2011    Completed 24wks Epclusa + RBV w/SVR12 - 2/2018  -     History of positive PPD, treatment status unknown     Pulmonary granulomas, negative sputum cultures for AFB and indeterminate quantferon test    History of substance abuse     Hypertension     Hypothyroidism     Liver replaced by transplant 8/23/2011    DATE: 12/16/2013  LIVER BIOPSY : REASON:  hep C staging  PATHOLOGY COMMITTEE NOTE/PLAN: :grade  1 / stage 1        Pancreatitis 2016    Peptic ulcer disease        Past Surgical History:   Procedure Laterality Date    CARPAL TUNNEL RELEASE Left 10/29/2021    Procedure: RELEASE, CARPAL TUNNEL;  Surgeon: Jameson Alejo Jr., MD;  Location: [facility] OR;  Service: Orthopedics;  Laterality: Left;    CHOLECYSTECTOMY      INJECTION OF JOINT Right 12/2/2019    Procedure: INJECTION, JOINT SI;  Surgeon: Thu Penn MD;  Location: [facility];  Service: Pain Management;  Laterality: Right;  RT SI JNT INJ    LIVER TRANSPLANT  06/2010    SPINE SURGERY         OBJECTIVE:   PHYSICAL EXAM:  Height: 6' 3" (190.5 cm) Weight: 117.1 kg " "(258 lb 2.5 oz)  Vitals:    05/02/22 1439   Weight: 117.1 kg (258 lb 2.5 oz)   Height: 6' 3" (1.905 m)   PainSc:   7   PainLoc: Hand     Ortho/SPM Exam  Examination left arm both i ncisions well-healed at the elbow and wrist  No significant swelling   Range of motion fingers full  strength decreased  No atrophy noted      RADIOGRAPHS:  None  Comments: I have personally reviewed the imaging and I agree with the above radiologist's report.    ASSESSMENT/PLAN:     IMPRESSION:  Numbness left hand after previous surgery    PLAN:  I explained the patient I am not sure if his symptoms are coming from diabetic neuropathy or some other issue  I have ordered nerve conduction study left arm we can compare these studies to the preop test  In the meantime continue current medications including Neurontin    FOLLOW UP:  After the nerve test is complete    Disclaimer: This note has been generated using voice-recognition software. There may be typographical errors that have been missed during proof-reading.    "

## 2022-05-30 NOTE — PROGRESS NOTES
Chronic patient Established Note (Follow up visit)      SUBJECTIVE:    Vick Gonzalez presents to the clinic for a follow-up appointment for lower back and right leg pain.  He is here today for 3 month medication refill.  He has a history of fusion at L5-S1 and emergent laminectomy for post op fluid collection that was done by Dr. Crabtree on 06/15/15. He has had multiple procedures in the past with limited relief. He is not interested in further procedures at this time.  He was recently admitted to the hospital for hyperglycemia on 2/27/2017.  He continues to follow up with internal medicine for glucose control.  He continues to take Roxicodone 15 mg QID PRN and Gabapentin with benefit.  He denies any bowel or bladder incontinence.  His pain today is 9/10.     Of note, the patient has a history of previous liver transplant and is monitored by hepatology.    Pain Disability Index Review:  Last 3 PDI Scores 3/1/2017 11/30/2016 8/30/2016   Pain Disability Index (PDI) 40 0 10       Pain Medications:    - Opioids: Roxicodone (Oxycodone/Acetaminophen) 15 mg QID PRN pain    Others: Gabapentin 2400 mg daily    Opioid Contract: yes     report:  Reviewed and consistent with medication use as prescribed.    Pain Procedures:   1/15/15 Right TFESI @ L5 & S1     Physical Therapy/Home Exercise: no    Imaging:     Lumbar MRI 6/16/15  Narrative   Technique: Routine lumbar spine MRI performed without contrast.    Comparison: MRI 06/16/2015, CT 06/15/2015.    Findings:    There are postsurgical changes consistent with L5-S1 posterior instrumented fusion with bilateral pedicular screws, vertical stabilizing rods and an intervertebral disk spacer.  When compared to the MRI dated 06/16/2015 00:33, there are new postsurgical   changes consistent with left L5 hemilaminectomy.  There is a 2.5 cm ill-defined fluid collection at the site of hemilaminectomy that is not well evaluated due to the lack of IV contrast but appears to extend  Patient's  called and provided with update   anteriorly into the spinal canal and into the   left foraminal zone.  There is as possible small fluid collection within the anterior right epidural space that may also due to compression of the adjacent spinal canal.  There is stable grade I anterolistheses of L5 on S1 with persistent high signal   intensity adjacent to the left lateral aspect of the disk spacer that demonstrates moderate associated bone marrow edema of the adjacent vertebral endplates, findings that are when compared to the previous exam.  Note is made that there is an apparent   high signal intensity within the nerve roots posterior to the L5-S1 level that was not definitely present on the previous exam.    There is mild persistent height loss loss involving the L5 vertebral body, likely degenerative in nature.  Remaining vertebral body heights are well-maintained without findings to suggest acute fracture.    There is mild intervertebral disk spacing and desiccation at L4-L5 with a small circumferential disk bulge. No disk protrusion or extrusion. There is moderate bilateral facet arthropathy and mild bilateral uncomfortable and buckling at this level.  These   findings contribute to mild spinal canal stenosis and mild bilateral foraminal narrowing.    Noting aforementioned postsurgical changes, there is persistent severe bilateral foraminal narrowing that is difficult to accurately characterize due to adjacent artifact.    There is edema anterior to the L4, L5 and S1 vertebral bodies, presumably postsurgical in nature.  No drainable fluid collections identified. Visualized retroperitoneal organs are unremarkable.   Impression       Postsurgical changes of L5-S1 posterior spinal fusion and left L5 hemilaminectomy. There is an ill-defined fluid collection at the site of hemilaminectomy that is not well evaluated due to the lack of IV contrast but appears to extend anteriorly with   suspected resulting mass effect on the spinal canal and the left  foraminal zone.  There is a suspected small fluid collection within the anterior right epidural space that may contribute to additional mass effect on the adjacent spinal canal. While   findings may represent sequela of expected postsurgical changes, continued follow-up is advised to ensure improvement and/or resolution.    Persistent abnormal high signal intensity adjacent to the left lateral aspect of the intervertebral disk spacer with moderate associated bone marrow edema of the adjacent vertebral endplates. Findings are similar when compared to the previous exam could   represent postsurgical changes. Early infection could have a similar appearance and is possible. Continued follow-up is advised.    Apparent high signal intensity within the nerve roots posterior to the L5-S1 level that was not definitely present on the previous exam. Findings may be artifactual in nature however, sequela of nerve root inflammation/edema is possible noting recent   surgery.    This report has been flagged in the Epic medical record     .  CMP  Sodium   Date Value Ref Range Status   02/28/2017 134 (L) 136 - 145 mmol/L Final     Potassium   Date Value Ref Range Status   02/28/2017 3.9 3.5 - 5.1 mmol/L Final     Chloride   Date Value Ref Range Status   02/28/2017 104 95 - 110 mmol/L Final     CO2   Date Value Ref Range Status   02/28/2017 25 23 - 29 mmol/L Final     Glucose   Date Value Ref Range Status   02/28/2017 206 (H) 70 - 110 mg/dL Final     BUN, Bld   Date Value Ref Range Status   02/28/2017 23 (H) 6 - 20 mg/dL Final     Creatinine   Date Value Ref Range Status   02/28/2017 1.4 0.5 - 1.4 mg/dL Final     Calcium   Date Value Ref Range Status   02/28/2017 7.8 (L) 8.7 - 10.5 mg/dL Final     Total Protein   Date Value Ref Range Status   02/28/2017 6.1 6.0 - 8.4 g/dL Final     Albumin   Date Value Ref Range Status   02/28/2017 2.8 (L) 3.5 - 5.2 g/dL Final     Total Bilirubin   Date Value Ref Range Status   02/28/2017 0.9 0.1 -  1.0 mg/dL Final     Comment:     For infants and newborns, interpretation of results should be based  on gestational age, weight and in agreement with clinical  observations.  Premature Infant recommended reference ranges:  Up to 24 hours.............<8.0 mg/dL  Up to 48 hours............<12.0 mg/dL  3-5 days..................<15.0 mg/dL  6-29 days.................<15.0 mg/dL       Alkaline Phosphatase   Date Value Ref Range Status   02/28/2017 182 (H) 55 - 135 U/L Final     AST   Date Value Ref Range Status   02/28/2017 147 (H) 10 - 40 U/L Final     ALT   Date Value Ref Range Status   02/28/2017 104 (H) 10 - 44 U/L Final     Anion Gap   Date Value Ref Range Status   02/28/2017 5 (L) 8 - 16 mmol/L Final     eGFR if    Date Value Ref Range Status   02/28/2017 >60.0 >60 mL/min/1.73 m^2 Final     eGFR if non    Date Value Ref Range Status   02/28/2017 54.6 (A) >60 mL/min/1.73 m^2 Final     Comment:     Calculation used to obtain the estimated glomerular filtration  rate (eGFR) is the CKD-EPI equation. Since race is unknown   in our information system, the eGFR values for   -American and Non--American patients are given   for each creatinine result.           Allergies:   Review of patient's allergies indicates:   Allergen Reactions    Naproxen      Other reaction(s): problems w/liver/kid    Oxaprozin      Other reaction(s): cause problems w/kid/liver       Current Medications:   Current Outpatient Prescriptions   Medication Sig Dispense Refill    allopurinol (ZYLOPRIM) 100 MG tablet Take 1 tablet (100 mg total) by mouth 2 (two) times daily. 60 tablet 11    amitriptyline (ELAVIL) 25 MG tablet Take 1 tablet (25 mg total) by mouth every evening. For nerve pain and sleep 30 tablet 11    blood sugar diagnostic Strp 1 strip by Misc.(Non-Drug; Combo Route) route 4 (four) times daily before meals and nightly. 100 strip 11    blood-glucose meter Misc 1 each by Misc.(Non-Drug;  "Combo Route) route 4 (four) times daily before meals and nightly. 1 each 0    calcium carbonate-vitamin D3 (CALTRATE 600 + D) 600 mg(1,500mg) -400 unit Chew       gabapentin (NEURONTIN) 600 MG tablet Take 1 tablet (600 mg total) by mouth 4 (four) times daily. 120 tablet 2    insulin aspart (NOVOLOG) 100 unit/mL InPn pen Inject 12 Units into the skin 3 (three) times daily with meals. 10.8 mL 11    insulin detemir (LEVEMIR FLEXTOUCH) 100 unit/mL (3 mL) SubQ InPn pen Inject 55 Units into the skin every evening. 15 mL 11    lancets Misc 1 each by Misc.(Non-Drug; Combo Route) route 4 (four) times daily before meals and nightly. 200 each 11    lancing device Misc 1 each by Misc.(Non-Drug; Combo Route) route 4 (four) times daily before meals and nightly. 1 each 0    levothyroxine (SYNTHROID) 88 MCG tablet Take 1 tablet (88 mcg total) by mouth once daily. 30 tablet 6    lisinopril 10 MG tablet Take 1 tablet (10 mg total) by mouth once daily. 90 tablet 2    metoprolol tartrate (LOPRESSOR) 50 MG tablet TAKE 3 TABLETS BY MOUTH 2 (TWO) TIMES DAILY. 180 tablet 3    multivitamin (THERAGRAN) per tablet Take 1 tablet by mouth.      NEXIUM 40 mg capsule TAKE ONE CAPSULE BY MOUTH EVERY DAY 90 capsule 3    nifedipine (ADALAT CC) 60 MG TbSR Take 1 tablet (60 mg total) by mouth once daily. 90 tablet 11    pen needle, diabetic (BD ULTRA-FINE MENDY PEN NEEDLES) 32 gauge x 5/32" Ndle Use with aspart TIDWM and with detemir  each 12    sertraline (ZOLOFT) 100 MG tablet Take 1 tablet (100 mg total) by mouth once daily. 30 tablet 11    sildenafil (VIAGRA) 100 MG tablet Take 1 tablet (100 mg total) by mouth daily as needed for Erectile Dysfunction. 30 tablet 11    tacrolimus (PROGRAF) 1 MG Cap Take 2mg in AM and 1mg in PM by mouth Liver Transplant Z94.4 90 capsule 11    ZETIA 10 mg tablet TAKE 1 TABLET BY MOUTH EVERY DAY 30 tablet 7     No current facility-administered medications for this visit.        REVIEW OF " SYSTEMS:    GENERAL:  No weight loss, malaise or fevers.  HEENT:  Negative for frequent or significant headaches.  NECK:  Negative for lumps, goiter, pain and significant neck swelling.  RESPIRATORY:  Negative for cough, wheezing or shortness of breath.  CARDIOVASCULAR:  Negative for chest pain, leg swelling or palpitations. H/O hypertension.  GI:  Negative for abdominal discomfort, blood in stools or black stools or change in bowel habits.  MUSCULOSKELETAL:  See HPI.  SKIN:  Negative for lesions, rash, and itching.  PSYCH:  Negative for sleep disturbance, mood disorder and recent psychosocial stressors.  HEMATOLOGY/LYMPHOLOGY:  Negative for prolonged bleeding, bruising easily or swollen nodes. H/O liver transplant.  NEURO:   No history of headaches, syncope, paralysis, seizures or tremors.  ENDO: Diabetes.   All other reviewed and negative other than HPI.    Past Medical History:  Past Medical History:   Diagnosis Date    Anemia     Anxiety     Chronic hepatitis C virus infection - RELAPSE following harvoni + RBV 8/23/2011    Completed 12 weeks Harvoni + RBV w/ RELAPSE 6 weeks out (3/2016)  Wk 1 Hgb 12.4, prograf 6.8 Wk 2 Hgb 12.7 Wk 4 Hgb 12.8, prograf 5.4; HCV RNA 47. INCREASE RBV to 600 Wk 5 Hgb 12.6 Wk 6 Hgb 12.4, HCV RNA <12, detected. INCREASE  Wk 7 Hgb 11.8 Wk 8 Hgb 11.7, prograf 4.0. INCREASE RBV 1000, INCREASE PROGRAF 2/1 Wk 9 Hgb 12.4, prograf 3.6  HCV RNA <12, detected. INCREASE PROGRAF 2/2 Wk 10 hgb     Chronic pain syndrome 7/13/2011    Diabetes mellitus type II, uncontrolled     Discitis of lumbosacral region 1/16/2015    ED (erectile dysfunction)     Genital herpes     Gout, arthritis     History of alcohol abuse     History of positive PPD, treatment status unknown     Pulmonary granulomas, negative sputum cultures for AFB and indeterminate quantferon test    History of substance abuse     Hypertension     Hypothyroidism     Peptic ulcer disease        Past Surgical  "History:  Past Surgical History:   Procedure Laterality Date    CHOLECYSTECTOMY      LIVER TRANSPLANT  06/2010    SPINE SURGERY         Family History:  Family History   Problem Relation Age of Onset    Cancer Mother     Diabetes Mother     Heart disease Mother     Diabetes Sister     Cancer Maternal Uncle 82     colon CA    Melanoma Neg Hx     Psoriasis Neg Hx     Lupus Neg Hx     Eczema Neg Hx     Acne Neg Hx        Social History:  Social History     Social History    Marital status:      Spouse name: N/A    Number of children: N/A    Years of education: N/A     Social History Main Topics    Smoking status: Former Smoker    Smokeless tobacco: Never Used    Alcohol use No      Comment: over 5 years ago, none currently    Drug use: No    Sexual activity: Not on file     Other Topics Concern    Not on file     Social History Narrative       OBJECTIVE:    /82  Pulse 82  Temp 98.5 °F (36.9 °C) (Oral)   Resp 18  Ht 6' 1" (1.854 m)  Wt 121 kg (266 lb 12.1 oz)  BMI 35.19 kg/m2    PHYSICAL EXAMINATION:    General appearance: Well appearing, in no acute distress, alert and oriented x3.  Psych:  Mood and affect appropriate.  Skin: Skin color, texture, turgor normal, no rashes or lesions, in both upper and lower body.  Head/face:  Atraumatic, normocephalic. No palpable lymph node  Cor: RRR  Pulm: CTA  GI: Abdomen soft and non-tender.  Back: Straight leg raising in the sitting and supine positions is negative to radicular pain. Limited lumbar flexion and extension with pain reproduction.  Positive facet loading bilaterally.  Extremities: Peripheral joint ROM is full and pain free without obvious instability or laxity in all four extremities. No deformities, edema, or skin discoloration. Good capillary refill.  Musculoskeletal:  Right plantar flexion 4/5.  All other LE strength 5/5 and symmetric.  No atrophy or tone abnormalities are noted.  Neuro: Bilateral upper and lower extremity " coordination and muscle stretch reflexes are physiologic and symmetric.  Plantar response are downgoing.  Decreased sensation to anterior feet.   Gait: Antalgic- ambulating without assistance.    ASSESSMENT: 61 y.o. year old male with lower back and right leg pain, consistent with the following diagnoses:     1. Postlaminectomy syndrome of lumbar region  oxycodone (ROXICODONE) 15 MG Tab    oxycodone (ROXICODONE) 15 MG Tab   2. Chronic, continuous use of opioids  oxycodone (ROXICODONE) 15 MG Tab    oxycodone (ROXICODONE) 15 MG Tab   3. Lumbar radiculopathy  oxycodone (ROXICODONE) 15 MG Tab    oxycodone (ROXICODONE) 15 MG Tab   4. S/P lumbar spinal fusion  oxycodone (ROXICODONE) 15 MG Tab    oxycodone (ROXICODONE) 15 MG Tab   5. Chronic pain syndrome  oxycodone (ROXICODONE) 15 MG Tab    oxycodone (ROXICODONE) 15 MG Tab         PLAN:     - Previous imaging was reviewed and discussed with the patient today. Recent labs reviewed with patient today.     - Patient can continue Roxicodone 15 mg QID PRN pain, #120. 3 months of prescriptions with appropriate dates was supplied today.    - We did discuss the importance of blood sugar control and that elevated glucose can play a role in pain level.     - The [place] Board of Pharmacy website for prescription monitoring was consulted today, and it does not suggest any deviations in conflict with the patient's controlled substance contract with our clinic. Will continue current therapy with frequent monitoring of the controlled substance database, and urine drug screens on followup. Refill approved.  Medication management by Dr. Penn.    - Continue Gabapentin 600 mg 2 tablets BID. Refill provided today.      - Last UDS from 11/30/16 was reviewed. UDS next office visit.     - RTC in 3 months or sooner if needed.    - Counseled patient regarding the importance of constant sleeping habits and physical therapy.    - Dr. Penn was consulted on the patient and agrees with this  plan.      The above plan and management options were discussed at length with patient. Patient is in agreement with the above and verbalized understanding.    Emma Batista  03/01/2017

## 2022-06-02 ENCOUNTER — TELEPHONE (OUTPATIENT)
Dept: ORTHOPEDICS | Facility: CLINIC | Age: 67
End: 2022-06-02
Payer: MEDICARE

## 2022-06-02 NOTE — TELEPHONE ENCOUNTER
----- Message from Azar Acosta sent at 6/2/2022  2:43 PM CDT -----  Contact: @ [phone]  Patient requesting a return call about pain he's experiencing with the hand, pls call or send medication to:    [id number] - [place] - [address]  [address] [address]  Phone: [phone] Fax: [phone]

## 2022-06-13 ENCOUNTER — OFFICE VISIT (OUTPATIENT)
Dept: ORTHOPEDICS | Facility: CLINIC | Age: 67
End: 2022-06-13
Payer: MEDICARE

## 2022-06-13 VITALS — WEIGHT: 258.19 LBS | BODY MASS INDEX: 32.1 KG/M2 | HEIGHT: 75 IN

## 2022-06-13 DIAGNOSIS — G56.02 CARPAL TUNNEL SYNDROME OF LEFT WRIST: Primary | ICD-10-CM

## 2022-06-13 PROCEDURE — 99213 OFFICE O/P EST LOW 20 MIN: CPT | Mod: S$PBB,25,, | Performed by: ORTHOPAEDIC SURGERY

## 2022-06-13 PROCEDURE — 20526 THER INJECTION CARP TUNNEL: CPT | Mod: S$PBB,LT,, | Performed by: ORTHOPAEDIC SURGERY

## 2022-06-13 PROCEDURE — 99213 PR OFFICE/OUTPT VISIT, EST, LEVL III, 20-29 MIN: ICD-10-PCS | Mod: S$PBB,25,, | Performed by: ORTHOPAEDIC SURGERY

## 2022-06-13 PROCEDURE — 20526 PR INJECT CARPAL TUNNEL: ICD-10-PCS | Mod: S$PBB,LT,, | Performed by: ORTHOPAEDIC SURGERY

## 2022-06-13 PROCEDURE — 99999 PR PBB SHADOW E&M-EST. PATIENT-LVL II: ICD-10-PCS | Mod: PBBFAC,,, | Performed by: ORTHOPAEDIC SURGERY

## 2022-06-13 PROCEDURE — 99212 OFFICE O/P EST SF 10 MIN: CPT | Mod: PBBFAC,PN,25 | Performed by: ORTHOPAEDIC SURGERY

## 2022-06-13 PROCEDURE — 99999 PR PBB SHADOW E&M-EST. PATIENT-LVL II: CPT | Mod: PBBFAC,,, | Performed by: ORTHOPAEDIC SURGERY

## 2022-06-13 PROCEDURE — 20526 THER INJECTION CARP TUNNEL: CPT | Mod: PBBFAC,PN | Performed by: ORTHOPAEDIC SURGERY

## 2022-06-13 RX ORDER — GABAPENTIN 300 MG/1
300 CAPSULE ORAL NIGHTLY
Qty: 30 CAPSULE | Refills: 2 | Status: ON HOLD | OUTPATIENT
Start: 2022-06-13 | End: 2022-08-26 | Stop reason: HOSPADM

## 2022-06-13 RX ORDER — TRIAMCINOLONE ACETONIDE 40 MG/ML
20 INJECTION, SUSPENSION INTRA-ARTICULAR; INTRAMUSCULAR
Status: COMPLETED | OUTPATIENT
Start: 2022-06-13 | End: 2022-06-13

## 2022-06-13 RX ADMIN — TRIAMCINOLONE ACETONIDE 20 MG: 40 INJECTION, SUSPENSION INTRA-ARTICULAR; INTRAMUSCULAR at 01:06

## 2022-06-13 NOTE — PROGRESS NOTES
"Subjective:      Patient ID: Vick Gonzalez is a 66 y.o. male.  Chief Complaint: Results (EMG )      HPI  Vick Gonzalez is a  66 y.o. male presenting today for follow up of left arm symptoms.  He reports that he is still having pain in left hand and numbness tingling of the left hand  He did have previous left cubital tunnel release and combined left carpal tunnel release more than 6 months ago  Recent nerve conduction study shows both areas have improved since surgery that he still has some neuropathy involving left hand some of which is coming from his neck area I went over that with him today and gave him a copy the report  He does have chronic radiculopathy of his neck  He has seen pain management for this in the past I recommended that he follow-up with the Pain Clinic  .    Review of patient's allergies indicates:  No Known Allergies      Current Outpatient Medications   Medication Sig Dispense Refill    albuterol (VENTOLIN HFA) 90 mcg/actuation inhaler Inhale 2 puffs into the lungs every 6 (six) hours as needed for Wheezing or Shortness of Breath. Rescue 8.5 g 1    allopurinoL (ZYLOPRIM) 100 MG tablet TAKE 1 TABLET BY MOUTH TWICE A DAY (Patient taking differently: Take 100 mg by mouth 2 (two) times a day.) 60 tablet 7    BD ULTRA-FINE MENDY PEN NEEDLES 32 gauge x 5/32" Ndle       blood sugar diagnostic (TRUE METRIX GLUCOSE TEST STRIP) Strp USE 3 TIMES DAILY TO TEST BLOOD GLUCOSE LEVEL 100 strip 11    calcium carbonate-vitamin D3 (CALCIUM 600 WITH VITAMIN D3) 600 mg(1,500mg) -400 unit Chew Take 1 tablet by mouth once daily.       flash glucose sensor (FREESTYLE PALLAVI 2 SENSOR) Kit One sensor every 14 days 2 kit prn    gabapentin (NEURONTIN) 800 MG tablet TAKE 1 TABLET BY MOUTH THREE TIMES A DAY 90 tablet 3    glucose 4 GM chewable tablet Take 4 tablets (16 g total) by mouth as needed for Low blood sugar.  12    glucose 4 GM chewable tablet Take 4 tablets (16 g total) by mouth as needed for Low " "blood sugar (If having symptoms of blurry vision, palpitations, confusion, shakiness.  Please check sugars and if sugar below 70 please take 4 tablets and re-check sugar everry 15 minutes until sugars are above 70 and symptoms resolve.). 50 tablet 12    insulin glargine U-300 conc (TOUJEO MAX U-300 SOLOSTAR) 300 unit/mL (3 mL) insulin pen Inject 40 Units into the skin once daily. 3 mL 11    insulin lispro 100 unit/mL pen Inject 20 Units into the skin 3 (three) times daily with meals. 15 mL 11    lancets 30 gauge Misc 1 lancet by Misc.(Non-Drug; Combo Route) route 4 (four) times daily before meals and nightly. 200 each 11    levothyroxine (SYNTHROID) 88 MCG tablet TAKE 1 TABLET BY MOUTH EVERY DAY (Patient taking differently: Take 88 mcg by mouth before breakfast.) 90 tablet 4    lisinopriL (PRINIVIL,ZESTRIL) 40 MG tablet Take 1 tablet (40 mg total) by mouth once daily. 90 tablet 3    metoprolol succinate (TOPROL-XL) 200 MG 24 hr tablet Take 1 tablet (200 mg total) by mouth once daily. 90 tablet 3    multivitamin (THERAGRAN) per tablet Take 1 tablet by mouth once daily.       NIFEdipine (ADALAT CC) 60 MG TbSR TAKE 1 TABLET BY MOUTH EVERY DAY 90 tablet 11    NOVOFINE PLUS 32 gauge x 1/6" Ndle       ondansetron (ZOFRAN-ODT) 4 MG TbDL Take 1 tablet (4 mg total) by mouth every 6 (six) hours as needed (Nausea). 15 tablet 0    pen needle, diabetic (BD ULTRA-FINE MENDY PEN NEEDLE) 32 gauge x 5/32" Ndle TEST WITH NOVOLOG THREE TIMES A DAY WITH MEALS AND WITH LEVEMIR AT BEDTIME 100 each 11    rosuvastatin (CRESTOR) 5 MG tablet TAKE 1 TABLET BY MOUTH EVERY DAY 90 tablet 11    sertraline (ZOLOFT) 100 MG tablet TAKE 1 TABLET (100 MG TOTAL) BY MOUTH ONCE DAILY. 30 tablet 7    tacrolimus (PROGRAF) 1 MG Cap TAKE 2 CAPSULES (2 MG TOTAL) BY MOUTH IN THE MORNING & TAKE 1 CAPSULE (1 MG TOTAL) IN THE EVENING. 90 capsule 11    traZODone (DESYREL) 50 MG tablet TAKE 1 TABLET (50 MG TOTAL) BY MOUTH EVERY EVENING. 30 tablet 11 "    TRUE METRIX GLUCOSE METER Misc TEST 4 (FOUR) TIMES DAILY BEFORE MEALS AND NIGHTLY. 1 each 0    aluminum-magnesium hydroxide-simethicone (MAALOX) 200-200-20 mg/5 mL Susp Take 30 mLs by mouth 4 (four) times daily before meals and nightly. (Patient not taking: No sig reported) 769 mL 0    aspirin 81 MG Chew Take 1 tablet (81 mg total) by mouth once daily. 90 tablet 3    gabapentin (NEURONTIN) 300 MG capsule Take 1 capsule (300 mg total) by mouth every evening. 30 capsule 2    HYDROcodone-acetaminophen (NORCO) 5-325 mg per tablet 22 tablets.       No current facility-administered medications for this visit.       Past Medical History:   Diagnosis Date    Anemia     Anxiety     Chronic pain syndrome 7/13/2011    CKD (chronic kidney disease), stage III     Diabetes mellitus type II, uncontrolled     Discitis of lumbosacral region 1/16/2015    ED (erectile dysfunction)     Encounter for blood transfusion     Genital herpes     Gout, arthritis     History of alcohol abuse     History of hepatitis C, s/p successful Rx w/ SVR24 (cure) - 5/2018 8/23/2011    Completed 24wks Epclusa + RBV w/SVR12 - 2/2018  -     History of positive PPD, treatment status unknown     Pulmonary granulomas, negative sputum cultures for AFB and indeterminate quantferon test    History of substance abuse     Hypertension     Hypothyroidism     Liver replaced by transplant 8/23/2011    DATE: 12/16/2013  LIVER BIOPSY : REASON:  hep C staging  PATHOLOGY COMMITTEE NOTE/PLAN: :grade  1 / stage 1        Pancreatitis 2016    Peptic ulcer disease        Past Surgical History:   Procedure Laterality Date    CARPAL TUNNEL RELEASE Left 10/29/2021    Procedure: RELEASE, CARPAL TUNNEL;  Surgeon: Jameson Alejo Jr., MD;  Location: [facility] OR;  Service: Orthopedics;  Laterality: Left;    CHOLECYSTECTOMY      INJECTION OF JOINT Right 12/2/2019    Procedure: INJECTION, JOINT SI;  Surgeon: Thu Penn MD;  [facility];   "Service: Pain Management;  Laterality: Right;  RT SI JNT INJ    LIVER TRANSPLANT  06/2010    SPINE SURGERY         OBJECTIVE:   PHYSICAL EXAM:  Height: 6' 3" (190.5 cm) Weight: 117.1 kg (258 lb 2.5 oz)  Vitals:    06/13/22 1309   Weight: 117.1 kg (258 lb 2.5 oz)   Height: 6' 3" (1.905 m)   PainSc:   9   PainLoc: Hand     Ortho/SPM Exam  Examination left hand he has some mild swelling left hand mildly positive Tinel sign at the wrist range of motion of his    RADIOGRAPHS:  Fingers full  strength decreased none  Comments: I have personally reviewed the imaging and I agree with the above radiologist's report.    ASSESSMENT/PLAN:     IMPRESSION:  1 status post left cubital tunnel and left carpal tunnel release with residual numbness.    2. Superimposed cervical radiculopathy and neuropathy affecting left arm     PLAN:  I explained to the patient I believe most of his symptoms are probably coming from his neck and neuropathy  For treatment of this I recommended follow-up in the [facility] where he has been before  As far as the hand is concerned he may have some residual carpal tunnel and I think an injection might be helpful so I recommended injection today  After pause for time-out identified the left hand injected carpal tunnel with combination Kenalog 20 mg 0.5 cc xylocaine sterile technique  He tolerated the procedure well without complication  Continue wrist splint at night  I did refill Neurontin at bedtime      FOLLOW UP:  4-6 weeks    Disclaimer: This note has been generated using voice-recognition software. There may be typographical errors that have been missed during proof-reading.    "

## 2022-06-16 ENCOUNTER — OFFICE VISIT (OUTPATIENT)
Dept: PAIN MEDICINE | Facility: CLINIC | Age: 67
End: 2022-06-16
Payer: MEDICARE

## 2022-06-16 ENCOUNTER — TELEPHONE (OUTPATIENT)
Dept: PAIN MEDICINE | Facility: CLINIC | Age: 67
End: 2022-06-16

## 2022-06-16 ENCOUNTER — HOSPITAL ENCOUNTER (OUTPATIENT)
Dept: RADIOLOGY | Facility: HOSPITAL | Age: 67
Discharge: HOME | End: 2022-06-16
Attending: PHYSICAL MEDICINE & REHABILITATION
Payer: MEDICARE

## 2022-06-16 VITALS — SYSTOLIC BLOOD PRESSURE: 136 MMHG | DIASTOLIC BLOOD PRESSURE: 86 MMHG | HEART RATE: 84 BPM

## 2022-06-16 DIAGNOSIS — M48.02 SPINAL STENOSIS, CERVICAL REGION: ICD-10-CM

## 2022-06-16 DIAGNOSIS — R29.2: ICD-10-CM

## 2022-06-16 DIAGNOSIS — G89.4 CHRONIC PAIN SYNDROME: Primary | ICD-10-CM

## 2022-06-16 DIAGNOSIS — G89.29 CHRONIC NECK PAIN: ICD-10-CM

## 2022-06-16 DIAGNOSIS — M54.12 LEFT CERVICAL RADICULOPATHY: ICD-10-CM

## 2022-06-16 DIAGNOSIS — M54.12 CERVICAL RADICULOPATHY: Primary | ICD-10-CM

## 2022-06-16 DIAGNOSIS — M54.2 CHRONIC NECK PAIN: ICD-10-CM

## 2022-06-16 PROCEDURE — 3060F PR POS MICROALBUMINURIA RESULT DOCUMENTED/REVIEW: ICD-10-PCS | Mod: CPTII,S$GLB,, | Performed by: PHYSICAL MEDICINE & REHABILITATION

## 2022-06-16 PROCEDURE — 1160F PR REVIEW ALL MEDS BY PRESCRIBER/CLIN PHARMACIST DOCUMENTED: ICD-10-PCS | Mod: CPTII,S$GLB,, | Performed by: PHYSICAL MEDICINE & REHABILITATION

## 2022-06-16 PROCEDURE — 3051F HG A1C>EQUAL 7.0%<8.0%: CPT | Mod: CPTII,S$GLB,, | Performed by: PHYSICAL MEDICINE & REHABILITATION

## 2022-06-16 PROCEDURE — 99999 PR PBB SHADOW E&M-EST. PATIENT-LVL V: CPT | Mod: PBBFAC,,, | Performed by: PHYSICAL MEDICINE & REHABILITATION

## 2022-06-16 PROCEDURE — 99215 OFFICE O/P EST HI 40 MIN: CPT | Mod: S$GLB,,, | Performed by: PHYSICAL MEDICINE & REHABILITATION

## 2022-06-16 PROCEDURE — 72040 X-RAY EXAM NECK SPINE 2-3 VW: CPT | Mod: 26,,, | Performed by: RADIOLOGY

## 2022-06-16 PROCEDURE — 99499 UNLISTED E&M SERVICE: CPT | Mod: S$GLB,,, | Performed by: PHYSICAL MEDICINE & REHABILITATION

## 2022-06-16 PROCEDURE — 3066F NEPHROPATHY DOC TX: CPT | Mod: CPTII,S$GLB,, | Performed by: PHYSICAL MEDICINE & REHABILITATION

## 2022-06-16 PROCEDURE — 99215 PR OFFICE/OUTPT VISIT, EST, LEVL V, 40-54 MIN: ICD-10-PCS | Mod: S$GLB,,, | Performed by: PHYSICAL MEDICINE & REHABILITATION

## 2022-06-16 PROCEDURE — 99999 PR PBB SHADOW E&M-EST. PATIENT-LVL V: ICD-10-PCS | Mod: PBBFAC,,, | Performed by: PHYSICAL MEDICINE & REHABILITATION

## 2022-06-16 PROCEDURE — 3060F POS MICROALBUMINURIA REV: CPT | Mod: CPTII,S$GLB,, | Performed by: PHYSICAL MEDICINE & REHABILITATION

## 2022-06-16 PROCEDURE — 99499 RISK ADDL DX/OHS AUDIT: ICD-10-PCS | Mod: S$GLB,,, | Performed by: PHYSICAL MEDICINE & REHABILITATION

## 2022-06-16 PROCEDURE — 4010F PR ACE/ARB THEARPY RXD/TAKEN: ICD-10-PCS | Mod: CPTII,S$GLB,, | Performed by: PHYSICAL MEDICINE & REHABILITATION

## 2022-06-16 PROCEDURE — 72040 XR CERVICAL SPINE AP LATERAL: ICD-10-PCS | Mod: 26,,, | Performed by: RADIOLOGY

## 2022-06-16 PROCEDURE — 1159F PR MEDICATION LIST DOCUMENTED IN MEDICAL RECORD: ICD-10-PCS | Mod: CPTII,S$GLB,, | Performed by: PHYSICAL MEDICINE & REHABILITATION

## 2022-06-16 PROCEDURE — 3051F PR MOST RECENT HEMOGLOBIN A1C LEVEL 7.0 - < 8.0%: ICD-10-PCS | Mod: CPTII,S$GLB,, | Performed by: PHYSICAL MEDICINE & REHABILITATION

## 2022-06-16 PROCEDURE — 1159F MED LIST DOCD IN RCRD: CPT | Mod: CPTII,S$GLB,, | Performed by: PHYSICAL MEDICINE & REHABILITATION

## 2022-06-16 PROCEDURE — 3075F SYST BP GE 130 - 139MM HG: CPT | Mod: CPTII,S$GLB,, | Performed by: PHYSICAL MEDICINE & REHABILITATION

## 2022-06-16 PROCEDURE — 3075F PR MOST RECENT SYSTOLIC BLOOD PRESS GE 130-139MM HG: ICD-10-PCS | Mod: CPTII,S$GLB,, | Performed by: PHYSICAL MEDICINE & REHABILITATION

## 2022-06-16 PROCEDURE — 3079F PR MOST RECENT DIASTOLIC BLOOD PRESSURE 80-89 MM HG: ICD-10-PCS | Mod: CPTII,S$GLB,, | Performed by: PHYSICAL MEDICINE & REHABILITATION

## 2022-06-16 PROCEDURE — 72040 X-RAY EXAM NECK SPINE 2-3 VW: CPT | Mod: TC,FY

## 2022-06-16 PROCEDURE — 3066F PR DOCUMENTATION OF TREATMENT FOR NEPHROPATHY: ICD-10-PCS | Mod: CPTII,S$GLB,, | Performed by: PHYSICAL MEDICINE & REHABILITATION

## 2022-06-16 PROCEDURE — 4010F ACE/ARB THERAPY RXD/TAKEN: CPT | Mod: CPTII,S$GLB,, | Performed by: PHYSICAL MEDICINE & REHABILITATION

## 2022-06-16 PROCEDURE — 1160F RVW MEDS BY RX/DR IN RCRD: CPT | Mod: CPTII,S$GLB,, | Performed by: PHYSICAL MEDICINE & REHABILITATION

## 2022-06-16 PROCEDURE — 3079F DIAST BP 80-89 MM HG: CPT | Mod: CPTII,S$GLB,, | Performed by: PHYSICAL MEDICINE & REHABILITATION

## 2022-06-16 NOTE — PROGRESS NOTES
[facility]  New Patient H&P    Referring Provider: Jameson Alejo Jr., Md  [address] [address]    Chief Complaint:   Chief Complaint   Patient presents with    Neck Pain       History of Present Illness: Vick Gonzalez is a RHD 66 y.o. male referred by Dr. Jameson Alejo Jr. for neck and left hand pain.      The left hand pain has been present 2 years.  He had carpal tunnel decompression performed in October 2021. Denied traumatic onset. Pain is localized to the left whole hand but with radiation from the left side of the neck around paraspinals and trapezius. Pain is described as aching, burning and tingling. The pain is aggravated by nothing. The pain is alleviated by positional change but the patient is unsure which positions. Endorsed weakness and numbness in the entire left upper extremity. Endorsed relief from gabapentin 800mg in the AM and 1100mg nightly for bilateral foot numbness. Sleeping well with gabapentin.     Disabled for LBP for several years and was previously a .  Unable to use left hand for fine motor tasks due to numbness, weakness, and impaired coordination. He states he has trouble with balance and walking. He has trouble with buttons. Denies changes in bowel or bladder function. Denies saddle anesthesia. Denies recent fevers or infections. Denies unexplained weight loss.     He was previously seeing Dr. Penn for pain management and was maintained on chronic opioids, but these were discontinued due to failed UDS (+cocaine, +soma).    Severity: Currently: 6/10   Typical Range: 6-10/10     Exacerbation: 10/10          Previous Interventions:  - None    Previous Therapies:  PT/OT: yes  Relevant Surgery:    - Yes, L5-S1 posterior spinal fusion and left L5 hemilaminectomy   - Left Carpal tunnel release   - Left ulnar transposition  Previous Medications:   - NSAIDS:   - Muscle Relaxants:    - TCAs:   - SNRIs:   - Topicals:   - Anticonvulsants:   "Gabapentin   - Opioids: oxycodone     Current Pain Medications:  1. Gabapentin     Blood Thinners: ASA    Full Medication List:    Current Outpatient Medications:     albuterol (VENTOLIN HFA) 90 mcg/actuation inhaler, Inhale 2 puffs into the lungs every 6 (six) hours as needed for Wheezing or Shortness of Breath. Rescue, Disp: 8.5 g, Rfl: 1    allopurinoL (ZYLOPRIM) 100 MG tablet, TAKE 1 TABLET BY MOUTH TWICE A DAY (Patient taking differently: Take 100 mg by mouth 2 (two) times a day.), Disp: 60 tablet, Rfl: 7    aluminum-magnesium hydroxide-simethicone (MAALOX) 200-200-20 mg/5 mL Susp, Take 30 mLs by mouth 4 (four) times daily before meals and nightly. (Patient not taking: No sig reported), Disp: 769 mL, Rfl: 0    aspirin 81 MG Chew, Take 1 tablet (81 mg total) by mouth once daily., Disp: 90 tablet, Rfl: 3    BD ULTRA-FINE MENDY PEN NEEDLES 32 gauge x 5/32" Ndle, , Disp: , Rfl:     blood sugar diagnostic (TRUE METRIX GLUCOSE TEST STRIP) Strp, USE 3 TIMES DAILY TO TEST BLOOD GLUCOSE LEVEL, Disp: 100 strip, Rfl: 11    calcium carbonate-vitamin D3 (CALCIUM 600 WITH VITAMIN D3) 600 mg(1,500mg) -400 unit Chew, Take 1 tablet by mouth once daily. , Disp: , Rfl:     flash glucose sensor (FREESTYLE PALLAVI 2 SENSOR) Kit, One sensor every 14 days, Disp: 2 kit, Rfl: prn    gabapentin (NEURONTIN) 300 MG capsule, Take 1 capsule (300 mg total) by mouth every evening., Disp: 30 capsule, Rfl: 2    gabapentin (NEURONTIN) 800 MG tablet, TAKE 1 TABLET BY MOUTH THREE TIMES A DAY, Disp: 90 tablet, Rfl: 3    glucose 4 GM chewable tablet, Take 4 tablets (16 g total) by mouth as needed for Low blood sugar., Disp: , Rfl: 12    glucose 4 GM chewable tablet, Take 4 tablets (16 g total) by mouth as needed for Low blood sugar (If having symptoms of blurry vision, palpitations, confusion, shakiness.  Please check sugars and if sugar below 70 please take 4 tablets and re-check sugar everry 15 minutes until sugars are above 70 and " "symptoms resolve.)., Disp: 50 tablet, Rfl: 12    HYDROcodone-acetaminophen (NORCO) 5-325 mg per tablet, 22 tablets., Disp: , Rfl:     insulin glargine U-300 conc (TOUJEO MAX U-300 SOLOSTAR) 300 unit/mL (3 mL) insulin pen, Inject 40 Units into the skin once daily., Disp: 3 mL, Rfl: 11    insulin lispro 100 unit/mL pen, Inject 20 Units into the skin 3 (three) times daily with meals., Disp: 15 mL, Rfl: 11    lancets 30 gauge Misc, 1 lancet by Misc.(Non-Drug; Combo Route) route 4 (four) times daily before meals and nightly., Disp: 200 each, Rfl: 11    levothyroxine (SYNTHROID) 88 MCG tablet, TAKE 1 TABLET BY MOUTH EVERY DAY (Patient taking differently: Take 88 mcg by mouth before breakfast.), Disp: 90 tablet, Rfl: 4    lisinopriL (PRINIVIL,ZESTRIL) 40 MG tablet, Take 1 tablet (40 mg total) by mouth once daily., Disp: 90 tablet, Rfl: 3    metoprolol succinate (TOPROL-XL) 200 MG 24 hr tablet, Take 1 tablet (200 mg total) by mouth once daily., Disp: 90 tablet, Rfl: 3    multivitamin (THERAGRAN) per tablet, Take 1 tablet by mouth once daily. , Disp: , Rfl:     NIFEdipine (ADALAT CC) 60 MG TbSR, TAKE 1 TABLET BY MOUTH EVERY DAY, Disp: 90 tablet, Rfl: 11    NOVOFINE PLUS 32 gauge x 1/6" Ndle, , Disp: , Rfl:     ondansetron (ZOFRAN-ODT) 4 MG TbDL, Take 1 tablet (4 mg total) by mouth every 6 (six) hours as needed (Nausea)., Disp: 15 tablet, Rfl: 0    pen needle, diabetic (BD ULTRA-FINE MENDY PEN NEEDLE) 32 gauge x 5/32" Ndle, TEST WITH NOVOLOG THREE TIMES A DAY WITH MEALS AND WITH LEVEMIR AT BEDTIME, Disp: 100 each, Rfl: 11    rosuvastatin (CRESTOR) 5 MG tablet, TAKE 1 TABLET BY MOUTH EVERY DAY, Disp: 90 tablet, Rfl: 11    sertraline (ZOLOFT) 100 MG tablet, TAKE 1 TABLET (100 MG TOTAL) BY MOUTH ONCE DAILY., Disp: 30 tablet, Rfl: 7    tacrolimus (PROGRAF) 1 MG Cap, TAKE 2 CAPSULES (2 MG TOTAL) BY MOUTH IN THE MORNING & TAKE 1 CAPSULE (1 MG TOTAL) IN THE EVENING., Disp: 90 capsule, Rfl: 11    traZODone (DESYREL) 50 " MG tablet, TAKE 1 TABLET (50 MG TOTAL) BY MOUTH EVERY EVENING., Disp: 30 tablet, Rfl: 11    TRUE METRIX GLUCOSE METER Misc, TEST 4 (FOUR) TIMES DAILY BEFORE MEALS AND NIGHTLY., Disp: 1 each, Rfl: 0     Review of Systems:  ROS    Allergies:  Patient has no known allergies.     Medical History:   has a past medical history of Anemia, Anxiety, Chronic pain syndrome (7/13/2011), CKD (chronic kidney disease), stage III, Diabetes mellitus type II, uncontrolled, Discitis of lumbosacral region (1/16/2015), ED (erectile dysfunction), Encounter for blood transfusion, Genital herpes, Gout, arthritis, History of alcohol abuse, History of hepatitis C, s/p successful Rx w/ SVR24 (cure) - 5/2018 (8/23/2011), History of positive PPD, treatment status unknown, History of substance abuse, Hypertension, Hypothyroidism, Liver replaced by transplant (8/23/2011), Pancreatitis (2016), and Peptic ulcer disease.    Surgical History:   has a past surgical history that includes Liver transplant (06/2010); Cholecystectomy; Spine surgery; Injection of joint (Right, 12/2/2019); and Carpal tunnel release (Left, 10/29/2021).    Family History:  family history includes Cancer in his mother; Cancer (age of onset: 82) in his maternal uncle; Diabetes in his mother and sister; Drug abuse in his daughter; Heart disease in his mother.    Social History:   reports that he has quit smoking. He has never used smokeless tobacco. He reports previous drug use. He reports that he does not drink alcohol.    Physical Exam:  /86   Pulse 84   GEN: No acute distress. Calm, comfortable  HENT: Normocephalic, atraumatic, moist mucous membranes  EYE: Anicteric sclera, non-injected.   CV: Non-diaphoretic. Regular Rate. Radial Pulses 2+.  RESP: Breathing comfortably. Chest expansion symmetric.  EXT: No clubbing, cyanosis.   SKIN: Warm, & dry to palpation. No visible rashes or lesions of exposed skin.   PSYCH: Pleasant mood and appropriate affect. Recent and remote  memory intact.   GAIT: Independent ambulation  Neck Exam:       Inspection: No erythema, bruising.       Palpation: (+) TTP of left cervical paraspinals and trapezius. No TTP to midline      ROM:  Limitation in all planes. Pain with lateral bending bilaterally, worse to the right      Provocative Maneuvers:  (+) Spurling's on the left  (+) Phalen's on the left  Shoulder Exam:       Inspection: No erythema, bruising, surgical incisions      Palpation: (+) TTP of left AC joint, proximal biceps      ROM: Limited in abduction, flexion, extension, lateral bending      Provocative Maneuvers:  (+) Hawkin's on left       (+) Cross arm on left   Neurologic Exam:     Alert. Speech is fluent and appropriate.     Strength: 4/5 right hip flexion, elbow flexion and extension, left wrist extension and 4/5 left finger flexion and 5th digit abduction otherwise 5/5 throughout bilateral upper & lower extremities      Sensation: Grossly intact to light touch in bilateral upper & lower extremities but decreased distal to the left wrist and at the bilateral feet     Reflexes: 1+ in b/l biceps, brachioradialis, triceps. Absent achilles. 3+ patellar     Tone: No abnormality appreciated in bilateral upper or lower extremities     No Clonus b/l     Absent babinski b/l     (+) Castro bilaterally    Imaging:  - EMG/NCS BUE 5/12/22:      - CT Cervical Spine Without Contrast 12/24/2021  Cervical spine CT: Slight dextrocurvature with straightening of the cervical lordosis.  No occipital condylar fracture.  Mild degenerative change at the atlantodental interval.  Dens and lateral masses are otherwise well aligned and intact.  No vertebral acute compression fracture.  Chronic appearing minimal to mild anterior wedge deformity of C3, C4 and C5 vertebral bodies without associated radiolucency.  No displaced fracture, dislocation or significant listhesis.  No prevertebral soft tissue thickening.  No paraspinal mass or fluid collection.  No  subcutaneous emphysema or radiodense retained foreign body.  Multilevel degenerative disc disease with marginal osteophytes and uncovertebral and facet arthrosis most prominent at C3-4 through C6-7 levels.  C2-3: Posterior disc osteophyte complex without significant acquired canal stenosis.  No significant neural foraminal narrowing.  C3-4: Posterior disc osteophyte complex resulting in mild acquired canal stenosis.  Moderate to severe bilateral neural foraminal narrowing, right greater than left.  C4-5: Posterior disc osteophyte complex resulting in mild to moderate acquired canal stenosis.  Moderate to severe right and severe left neural foraminal narrowing.  C5-6: Posterior disc osteophyte complex resulting in mild to moderate acquired canal stenosis.  Severe bilateral neural foraminal narrowing, left more so than the right.  C6-7: Posterior disc osteophyte complex resulting in mild to moderate acquired canal stenosis.  Moderate to severe bilateral neural foraminal narrowing.  C7-T1: Posterior disc osteophyte complex resulting in minimal acquired canal stenosis.  Minimal bilateral neural foraminal narrowing.   Included airway is midline and patent.  Minimal biapical pleuroparenchymal scarring and paraseptal emphysematous change.  There is a 4 mm calcified granuloma within the left upper lobe.  There is a 3 mm solid nodule within the left upper lobe.  Impression:  1. Few scattered areas of thin hyperattenuation along the interhemispheric fissure new from head CT of 02/27/2017.  Differential considerations include newly developed extra-axial calcifications along the falx versus trace subdural hemorrhage in the setting of recent trauma.  Clinical correlation advised.  Further evaluation with MRI brain can be obtained as warranted.  2. Otherwise no acute large vascular territory infarct or parenchymal hemorrhage seen.  3. Supratentorial white matter suspected advanced chronic microvascular ischemic change.  4. No CT  evidence of cervical spine acute osseous traumatic injury.  5. Cervical spondylosis most prominent at C3-4 through C6-7 levels as above, progressed since cervical spine CT in 2014.  6. Left upper lobe 3 mm solid pulmonary nodule.  For a solid nodule <6 mm, Fleischner Society 2017 guidelines recommend no routine follow up for a low risk patient, or follow-up with non-contrast chest CT at 12 months in a high risk patient.  This report was flagged in Epic as abnormal.      - XR SHOULDER TRAUMA 3 VIEW LEFT 2/24/2022:  Bones: No fracture.  No lytic or blastic lesion.  Joints: No evidence for glenohumeral dislocation.  Acromioclavicular joint is unremarkable.  Soft tissues: Unremarkable.  Impression:  No acute fracture.      Labs:  BMP  Lab Results   Component Value Date     02/24/2022    K 3.8 02/24/2022     02/24/2022    CO2 26 02/24/2022    BUN 13 02/24/2022    CREATININE 1.1 02/24/2022    CALCIUM 9.6 02/24/2022    ANIONGAP 11 02/24/2022    ESTGFRAFRICA >60.0 02/24/2022    EGFRNONAA >60.0 02/24/2022     Lab Results   Component Value Date    ALT 31 02/24/2022    AST 25 02/24/2022     (H) 04/24/2017    ALKPHOS 93 02/24/2022    BILITOT 0.5 02/24/2022     Lab Results   Component Value Date     (L) 02/24/2022     Lab Results   Component Value Date    HGBA1C 7.4 (H) 01/25/2022       Assessment:  Vick Gonzalez is a 66 y.o. male with the following diagnoses based on history, exam, and imaging:    Problem List Items Addressed This Visit        Neuro    Chronic pain syndrome - Primary      Other Visit Diagnoses     Spinal stenosis, cervical region        Relevant Orders    X-Ray Cervical Spine AP And Lateral    MRI Cervical Spine Without Contrast    Left cervical radiculopathy        Relevant Orders    X-Ray Cervical Spine AP And Lateral    MRI Cervical Spine Without Contrast    Chronic neck pain        Castro's reflex positive        Relevant Orders    X-Ray Cervical Spine AP And Lateral    MRI  Cervical Spine Without Contrast          This is a pleasant 66 y.o. gentleman presenting with:     - Chronic Neck Pain and left upper extremity/hand pain: Pain is multifactorial. Prior EMG in media section reveals b/l carpal tunnel syndrome, ulnar neuropathy at the wrist b/l, and chronic b/l C7>C5, C6 radiculopathies. Physical exam is concerning for potential cervical myelopathy.   - Left shoulder pain: TTP over left proximal biceps tendon and AC joint and also likely contributions from cervical spine.   - Diabetic Peripheral polyneuropathy  - Chronic Pain Syndrome: Prior history of management of chronic pain with chronic opioid use, but failed UDS for cocaine and soma and was tapered off of medications. H/o substance, hep C and liver transplant. I do not think he is a good candidate for chronic opioids, and he does not seem to be drug seeking at this time and more looking for pain relief.   - Comorbidities: History of substance abuse (cocaine, soma). T2DM, liver transplant s/p ETOH abuse and Hep C s/p treatment, HTN, PAD, HLD, hypothyroidism, Gout. PUD    Treatment Plan:   - PT/OT/HEP: Plan for Referral to PT for cervical and left shoulder pain if myelomalacia on MRI. Discussed benefits of exercise for pain.   - Procedures: Pending MRI, will plan for C7-T1 IL ALCIDES vs. Surgical evaluation  - Medications: No changes recommended at this time.  - Imaging: Reviewed. X-ray of the C-spine, MRI w/o contrast of the C-spine ordered  - Labs: Reviewed.  Medications are appropriately dosed for current hepatorenal function.    Follow Up: RTC 2-3 weeks after ALCIDES    Shane Babb MD  [facility] PM&R Resident     The patient was seen and examined with the resident. I agree with the above documentation, and have edited as necessary.     I spent greater than 45 minutes in total in todays visit including face-to-face time with the patient, and time reviewing records/imaging/labs, and documenting.       Katheryn Cardoza M.D.  Interventional  Pain Medicine / Physical Medicine & Rehabilitation    Disclaimer: This note was partly generated using dictation software which may occasionally result in transcription errors.

## 2022-06-22 ENCOUNTER — TELEPHONE (OUTPATIENT)
Dept: PAIN MEDICINE | Facility: CLINIC | Age: 67
End: 2022-06-22
Payer: MEDICARE

## 2022-06-22 NOTE — TELEPHONE ENCOUNTER
MD Karin Rios LPN; Kamilla Lincoln MA  Please just cancel his scheduled ALCIDES and schedule him a clinic visit with Bony after the MRI to review results. We need to make sure to get the PDI put in for him as we have all the other information needed.     Thanks,   KP             Previous Messages       ----- Message -----   From: Karin Rivera LPN   Sent: 6/21/2022   3:46 PM CDT   To: Katheryn Cardoza MD       ----- Message -----   From: Katheryn Flores   Sent: 6/21/2022   3:43 PM CDT   To: [facility]     Good afternoon,     MRN: [id number]     Patient's insurance is requesting additional information, please see below and advise.     RIKY DIALLO, I NEED TO ADD'L INFO. DOCUMENTAITON OF AT LEAST 4 WEEKS OF CONSERVATIVE THERAPY.     MEDICARE STATES:   Epidural steroid injection (ALCIDES) will be considered medically reasonable and necessary when the following   requirements are met:   History, physical examination, and concordant radiological image-based diagnostic testing that supports   one of the following:   Lumbar, cervical or thoracic radiculopathy, radicular pain and/or neurogenic claudication due to   disc herniation, osteophyte or osteophyte complexes, severe degenerative disc disease, producing   foraminal or central spinal stenosis.   AND     Radiculopathy, radicular pain and/or neurogenic claudication is severe enough to greatly impact quality   of life or function. An objective pain scale or functional assessment must be performed at baseline (prior   to interventions). The same scale* must be used at each follow-up for assessment of response.     AND     Pain duration of at least four (4) weeks, and the inability to tolerate noninvasive conservative care or   medical documentation of failure to respond to four (4) weeks of noninvasive conservative care or acute   herpes zoster refractory to conservative management where a four (4) week wait is not required     Thanks,    Katheryn

## 2022-06-23 ENCOUNTER — PATIENT OUTREACH (OUTPATIENT)
Dept: ADMINISTRATIVE | Facility: HOSPITAL | Age: 67
End: 2022-06-23
Payer: MEDICARE

## 2022-06-25 ENCOUNTER — TELEPHONE (OUTPATIENT)
Dept: FAMILY MEDICINE | Facility: CLINIC | Age: 67
End: 2022-06-25
Payer: MEDICARE

## 2022-06-25 NOTE — TELEPHONE ENCOUNTER
----- Message from Karin Rivera LPN sent at 6/16/2022 10:12 AM CDT -----  Regarding: Clearance request  Good Morning,  Patient is schedule to have C7-T1 ALCIDES with Dr. Cardoza on 7/6/2022. Patient will need clearance to hold ASA x 5 days prior to procedure. Please advise if ok to hold.     Sincerely,  Karin Rivera LPN

## 2022-06-29 ENCOUNTER — TELEPHONE (OUTPATIENT)
Dept: FAMILY MEDICINE | Facility: CLINIC | Age: 67
End: 2022-06-29
Payer: MEDICARE

## 2022-06-29 NOTE — TELEPHONE ENCOUNTER
Second attempt to reach patient because mobile number would not ring on last attempt. Immediately went to voicemail with a woman's voice.

## 2022-06-29 NOTE — TELEPHONE ENCOUNTER
----- Message from Karin Love sent at 6/29/2022 10:50 AM CDT -----  Contact: [phone]  Type:  Needs Medical Advice    Who Called: Pt  Reason for call: regarding speaking with the nurse regarding something Very Important  Pharmacy name and phone #:  n/a  Would the patient rather a call back or a response via [facility]? Call back  Best Call Back Number: [phone]

## 2022-06-29 NOTE — TELEPHONE ENCOUNTER
Attempted to return patient's call. Number is not able to accept calls, tried mobile number and it wouldn't ring.

## 2022-07-01 NOTE — TELEPHONE ENCOUNTER
----- Message from Merlin Moody sent at 7/1/2022  3:39 PM CDT -----  Contact: [phone]  Who Called: PT  Regarding: script has to be sent to [facility] for testing strips   Would the patient rather a call back or a response via [facility]? Call back  Best Call Back Number:[phone]  Additional Information: n/a

## 2022-07-01 NOTE — TELEPHONE ENCOUNTER
No new care gaps identified.  [facility] Embedded Care Gaps. Reference number: [id number]. 7/01/2022   4:46:41 PM CDT

## 2022-07-05 ENCOUNTER — TELEPHONE (OUTPATIENT)
Dept: PAIN MEDICINE | Facility: CLINIC | Age: 67
End: 2022-07-05
Payer: MEDICARE

## 2022-07-06 ENCOUNTER — OFFICE VISIT (OUTPATIENT)
Dept: PAIN MEDICINE | Facility: CLINIC | Age: 67
End: 2022-07-06
Payer: MEDICARE

## 2022-07-06 ENCOUNTER — HOSPITAL ENCOUNTER (OUTPATIENT)
Dept: RADIOLOGY | Facility: HOSPITAL | Age: 67
Discharge: HOME | End: 2022-07-06
Attending: PHYSICAL MEDICINE & REHABILITATION
Payer: MEDICARE

## 2022-07-06 VITALS
DIASTOLIC BLOOD PRESSURE: 76 MMHG | WEIGHT: 258.19 LBS | SYSTOLIC BLOOD PRESSURE: 134 MMHG | BODY MASS INDEX: 32.27 KG/M2 | HEART RATE: 86 BPM

## 2022-07-06 DIAGNOSIS — R29.2: ICD-10-CM

## 2022-07-06 DIAGNOSIS — M48.02 SPINAL STENOSIS, CERVICAL REGION: ICD-10-CM

## 2022-07-06 DIAGNOSIS — G89.4 CHRONIC PAIN SYNDROME: ICD-10-CM

## 2022-07-06 DIAGNOSIS — M54.12 CERVICAL RADICULOPATHY: Primary | ICD-10-CM

## 2022-07-06 DIAGNOSIS — M96.1 POSTLAMINECTOMY SYNDROME OF LUMBAR REGION: ICD-10-CM

## 2022-07-06 DIAGNOSIS — G89.29 CHRONIC NECK PAIN: ICD-10-CM

## 2022-07-06 DIAGNOSIS — M54.12 LEFT CERVICAL RADICULOPATHY: ICD-10-CM

## 2022-07-06 DIAGNOSIS — Z98.1 S/P LUMBAR SPINAL FUSION: ICD-10-CM

## 2022-07-06 DIAGNOSIS — M54.2 CHRONIC NECK PAIN: ICD-10-CM

## 2022-07-06 PROCEDURE — 3078F DIAST BP <80 MM HG: CPT | Mod: CPTII,S$GLB,, | Performed by: NURSE PRACTITIONER

## 2022-07-06 PROCEDURE — 99999 PR PBB SHADOW E&M-EST. PATIENT-LVL V: ICD-10-PCS | Mod: PBBFAC,,, | Performed by: NURSE PRACTITIONER

## 2022-07-06 PROCEDURE — 99999 PR PBB SHADOW E&M-EST. PATIENT-LVL V: CPT | Mod: PBBFAC,,, | Performed by: NURSE PRACTITIONER

## 2022-07-06 PROCEDURE — 3066F PR DOCUMENTATION OF TREATMENT FOR NEPHROPATHY: ICD-10-PCS | Mod: CPTII,S$GLB,, | Performed by: NURSE PRACTITIONER

## 2022-07-06 PROCEDURE — 72141 MRI NECK SPINE W/O DYE: CPT | Mod: 26,,, | Performed by: RADIOLOGY

## 2022-07-06 PROCEDURE — 99499 RISK ADDL DX/OHS AUDIT: ICD-10-PCS | Mod: S$GLB,,, | Performed by: NURSE PRACTITIONER

## 2022-07-06 PROCEDURE — 72141 MRI NECK SPINE W/O DYE: CPT | Mod: TC

## 2022-07-06 PROCEDURE — 1159F PR MEDICATION LIST DOCUMENTED IN MEDICAL RECORD: ICD-10-PCS | Mod: CPTII,S$GLB,, | Performed by: NURSE PRACTITIONER

## 2022-07-06 PROCEDURE — 72141 MRI CERVICAL SPINE WITHOUT CONTRAST: ICD-10-PCS | Mod: 26,,, | Performed by: RADIOLOGY

## 2022-07-06 PROCEDURE — 99499 UNLISTED E&M SERVICE: CPT | Mod: S$GLB,,, | Performed by: NURSE PRACTITIONER

## 2022-07-06 PROCEDURE — 1160F RVW MEDS BY RX/DR IN RCRD: CPT | Mod: CPTII,S$GLB,, | Performed by: NURSE PRACTITIONER

## 2022-07-06 PROCEDURE — 99214 OFFICE O/P EST MOD 30 MIN: CPT | Mod: S$GLB,,, | Performed by: NURSE PRACTITIONER

## 2022-07-06 PROCEDURE — 3051F PR MOST RECENT HEMOGLOBIN A1C LEVEL 7.0 - < 8.0%: ICD-10-PCS | Mod: CPTII,S$GLB,, | Performed by: NURSE PRACTITIONER

## 2022-07-06 PROCEDURE — 3075F SYST BP GE 130 - 139MM HG: CPT | Mod: CPTII,S$GLB,, | Performed by: NURSE PRACTITIONER

## 2022-07-06 PROCEDURE — 3075F PR MOST RECENT SYSTOLIC BLOOD PRESS GE 130-139MM HG: ICD-10-PCS | Mod: CPTII,S$GLB,, | Performed by: NURSE PRACTITIONER

## 2022-07-06 PROCEDURE — 3078F PR MOST RECENT DIASTOLIC BLOOD PRESSURE < 80 MM HG: ICD-10-PCS | Mod: CPTII,S$GLB,, | Performed by: NURSE PRACTITIONER

## 2022-07-06 PROCEDURE — 99214 PR OFFICE/OUTPT VISIT, EST, LEVL IV, 30-39 MIN: ICD-10-PCS | Mod: S$GLB,,, | Performed by: NURSE PRACTITIONER

## 2022-07-06 PROCEDURE — 3051F HG A1C>EQUAL 7.0%<8.0%: CPT | Mod: CPTII,S$GLB,, | Performed by: NURSE PRACTITIONER

## 2022-07-06 PROCEDURE — 3060F POS MICROALBUMINURIA REV: CPT | Mod: CPTII,S$GLB,, | Performed by: NURSE PRACTITIONER

## 2022-07-06 PROCEDURE — 3060F PR POS MICROALBUMINURIA RESULT DOCUMENTED/REVIEW: ICD-10-PCS | Mod: CPTII,S$GLB,, | Performed by: NURSE PRACTITIONER

## 2022-07-06 PROCEDURE — 3008F PR BODY MASS INDEX (BMI) DOCUMENTED: ICD-10-PCS | Mod: CPTII,S$GLB,, | Performed by: NURSE PRACTITIONER

## 2022-07-06 PROCEDURE — 4010F PR ACE/ARB THEARPY RXD/TAKEN: ICD-10-PCS | Mod: CPTII,S$GLB,, | Performed by: NURSE PRACTITIONER

## 2022-07-06 PROCEDURE — 3066F NEPHROPATHY DOC TX: CPT | Mod: CPTII,S$GLB,, | Performed by: NURSE PRACTITIONER

## 2022-07-06 PROCEDURE — 4010F ACE/ARB THERAPY RXD/TAKEN: CPT | Mod: CPTII,S$GLB,, | Performed by: NURSE PRACTITIONER

## 2022-07-06 PROCEDURE — 1159F MED LIST DOCD IN RCRD: CPT | Mod: CPTII,S$GLB,, | Performed by: NURSE PRACTITIONER

## 2022-07-06 PROCEDURE — 1160F PR REVIEW ALL MEDS BY PRESCRIBER/CLIN PHARMACIST DOCUMENTED: ICD-10-PCS | Mod: CPTII,S$GLB,, | Performed by: NURSE PRACTITIONER

## 2022-07-06 PROCEDURE — 3008F BODY MASS INDEX DOCD: CPT | Mod: CPTII,S$GLB,, | Performed by: NURSE PRACTITIONER

## 2022-07-06 NOTE — PROGRESS NOTES
[facility]  Established Clinic Visit     Referring Provider: No referring provider defined for this encounter.    Chief Complaint:   Chief Complaint   Patient presents with    Neck Pain       History of Present Illness: Vick Gonzalez is a RHD 66 y.o. male referred by Dr. Jameson Alejo Jr. for neck and left hand pain.      The left hand pain has been present 2 years.  He had carpal tunnel decompression performed in October 2021. Denied traumatic onset. Pain is localized to the left whole hand but with radiation from the left side of the neck around paraspinals and trapezius. Pain is described as aching, burning and tingling. The pain is aggravated by nothing. The pain is alleviated by positional change but the patient is unsure which positions. Endorsed weakness and numbness in the entire left upper extremity. Endorsed relief from gabapentin 800mg in the AM and 1100mg nightly for bilateral foot numbness. Sleeping well with gabapentin.     Disabled for LBP for several years and was previously a .  Unable to use left hand for fine motor tasks due to numbness, weakness, and impaired coordination. He states he has trouble with balance and walking. He has trouble with buttons. Denies changes in bowel or bladder function. Denies saddle anesthesia. Denies recent fevers or infections. Denies unexplained weight loss.     He was previously seeing Dr. Penn for pain management and was maintained on chronic opioids, but these were discontinued due to failed UDS (+cocaine, +soma).    Severity: Currently: 6/10   Typical Range: 6-10/10     Exacerbation: 10/10      Interval History (07/06/2022):  Vick Gonzalez returns today for follow up.  At the last clinic visit, ordered imaging, Currently, the neck and left arm pain is worse.      No change in the location or quality of the pain since the most recent visit is reported.  No significant interval events or traumas. No change in bowel or bladder function, &  "no new weakness or numbness is reported. No new musculoskeletal pain complaints.    Current Pain Scales:    Current: 6/10              Typical Range: 6-6/10     Pain Disability Index  Family/Home Responsibilities:: 6  Recreation:: 3  Social Activity:: 8  Occupation:: 10  Sexual Behavior:: 0  Self Care:: 7  Life-Support Activities:: 0  Pain Disability Index (PDI): 34    Previous Interventions:  - None    Previous Therapies:  PT/OT: yes  Relevant Surgery:    - Yes, L5-S1 posterior spinal fusion and left L5 hemilaminectomy   - Left Carpal tunnel release   - Left ulnar transposition  Previous Medications:   - NSAIDS:   - Muscle Relaxants:    - TCAs:   - SNRIs:   - Topicals:   - Anticonvulsants:  Gabapentin   - Opioids: oxycodone     Current Pain Medications:  1. Gabapentin     Blood Thinners: ASA    Full Medication List:    Current Outpatient Medications:     albuterol (VENTOLIN HFA) 90 mcg/actuation inhaler, Inhale 2 puffs into the lungs every 6 (six) hours as needed for Wheezing or Shortness of Breath. Rescue, Disp: 8.5 g, Rfl: 1    allopurinoL (ZYLOPRIM) 100 MG tablet, TAKE 1 TABLET BY MOUTH TWICE A DAY (Patient taking differently: Take 100 mg by mouth 2 (two) times a day.), Disp: 60 tablet, Rfl: 7    aluminum-magnesium hydroxide-simethicone (MAALOX) 200-200-20 mg/5 mL Susp, Take 30 mLs by mouth 4 (four) times daily before meals and nightly., Disp: 769 mL, Rfl: 0    BD ULTRA-FINE MENDY PEN NEEDLES 32 gauge x 5/32" Ndle, , Disp: , Rfl:     blood sugar diagnostic (TRUE METRIX GLUCOSE TEST STRIP) Strp, USE 3 TIMES DAILY TO TEST BLOOD GLUCOSE LEVEL, Disp: 100 strip, Rfl: 11    calcium carbonate-vitamin D3 (CALCIUM 600 WITH VITAMIN D3) 600 mg(1,500mg) -400 unit Chew, Take 1 tablet by mouth once daily. , Disp: , Rfl:     flash glucose sensor (FREESTYLE PALLAVI 2 SENSOR) Kit, One sensor every 14 days, Disp: 2 kit, Rfl: prn    gabapentin (NEURONTIN) 300 MG capsule, Take 1 capsule (300 mg total) by mouth every evening., " "Disp: 30 capsule, Rfl: 2    gabapentin (NEURONTIN) 800 MG tablet, TAKE 1 TABLET BY MOUTH THREE TIMES A DAY, Disp: 90 tablet, Rfl: 3    glucose 4 GM chewable tablet, Take 4 tablets (16 g total) by mouth as needed for Low blood sugar., Disp: , Rfl: 12    glucose 4 GM chewable tablet, Take 4 tablets (16 g total) by mouth as needed for Low blood sugar (If having symptoms of blurry vision, palpitations, confusion, shakiness.  Please check sugars and if sugar below 70 please take 4 tablets and re-check sugar everry 15 minutes until sugars are above 70 and symptoms resolve.)., Disp: 50 tablet, Rfl: 12    insulin glargine U-300 conc (TOUJEO MAX U-300 SOLOSTAR) 300 unit/mL (3 mL) insulin pen, Inject 40 Units into the skin once daily., Disp: 3 mL, Rfl: 11    insulin lispro 100 unit/mL pen, Inject 20 Units into the skin 3 (three) times daily with meals., Disp: 15 mL, Rfl: 11    lancets 30 gauge Misc, 1 lancet by Misc.(Non-Drug; Combo Route) route 4 (four) times daily before meals and nightly., Disp: 200 each, Rfl: 11    levothyroxine (SYNTHROID) 88 MCG tablet, TAKE 1 TABLET BY MOUTH EVERY DAY (Patient taking differently: Take 88 mcg by mouth before breakfast.), Disp: 90 tablet, Rfl: 4    lisinopriL (PRINIVIL,ZESTRIL) 40 MG tablet, TAKE 1 TABLET BY MOUTH EVERY DAY, Disp: 90 tablet, Rfl: 3    metoprolol succinate (TOPROL-XL) 200 MG 24 hr tablet, Take 1 tablet (200 mg total) by mouth once daily., Disp: 90 tablet, Rfl: 3    multivitamin (THERAGRAN) per tablet, Take 1 tablet by mouth once daily. , Disp: , Rfl:     NIFEdipine (ADALAT CC) 60 MG TbSR, TAKE 1 TABLET BY MOUTH EVERY DAY, Disp: 90 tablet, Rfl: 11    NOVOFINE PLUS 32 gauge x 1/6" Ndle, , Disp: , Rfl:     ondansetron (ZOFRAN-ODT) 4 MG TbDL, Take 1 tablet (4 mg total) by mouth every 6 (six) hours as needed (Nausea)., Disp: 15 tablet, Rfl: 0    pen needle, diabetic (BD ULTRA-FINE MENDY PEN NEEDLE) 32 gauge x 5/32" Ndle, TEST WITH NOVOLOG THREE TIMES A DAY WITH " MEALS AND WITH LEVEMIR AT BEDTIME, Disp: 100 each, Rfl: 11    rosuvastatin (CRESTOR) 5 MG tablet, TAKE 1 TABLET BY MOUTH EVERY DAY, Disp: 90 tablet, Rfl: 11    sertraline (ZOLOFT) 100 MG tablet, TAKE 1 TABLET (100 MG TOTAL) BY MOUTH ONCE DAILY., Disp: 30 tablet, Rfl: 7    tacrolimus (PROGRAF) 1 MG Cap, TAKE 2 CAPSULES (2 MG TOTAL) BY MOUTH IN THE MORNING & TAKE 1 CAPSULE (1 MG TOTAL) IN THE EVENING., Disp: 90 capsule, Rfl: 11    traZODone (DESYREL) 50 MG tablet, TAKE 1 TABLET (50 MG TOTAL) BY MOUTH EVERY EVENING., Disp: 30 tablet, Rfl: 11    TRUE METRIX GLUCOSE METER Misc, TEST 4 (FOUR) TIMES DAILY BEFORE MEALS AND NIGHTLY., Disp: 1 each, Rfl: 0    aspirin 81 MG Chew, Take 1 tablet (81 mg total) by mouth once daily., Disp: 90 tablet, Rfl: 3    HYDROcodone-acetaminophen (NORCO) 5-325 mg per tablet, 22 tablets., Disp: , Rfl:      Review of Systems:  ROS    Allergies:  Patient has no known allergies.     Medical History:   has a past medical history of Anemia, Anxiety, Chronic pain syndrome (7/13/2011), CKD (chronic kidney disease), stage III, Diabetes mellitus type II, uncontrolled, Discitis of lumbosacral region (1/16/2015), ED (erectile dysfunction), Encounter for blood transfusion, Genital herpes, Gout, arthritis, History of alcohol abuse, History of hepatitis C, s/p successful Rx w/ SVR24 (cure) - 5/2018 (8/23/2011), History of positive PPD, treatment status unknown, History of substance abuse, Hypertension, Hypothyroidism, Liver replaced by transplant (8/23/2011), Pancreatitis (2016), and Peptic ulcer disease.    Surgical History:   has a past surgical history that includes Liver transplant (06/2010); Cholecystectomy; Spine surgery; Injection of joint (Right, 12/2/2019); and Carpal tunnel release (Left, 10/29/2021).    Family History:  family history includes Cancer in his mother; Cancer (age of onset: 82) in his maternal uncle; Diabetes in his mother and sister; Drug abuse in his daughter; Heart disease  in his mother.    Social History:   reports that he has quit smoking. He has never used smokeless tobacco. He reports previous drug use. He reports that he does not drink alcohol.    Physical Exam:  /76   Pulse 86   Wt 117.1 kg (258 lb 2.5 oz)   BMI 32.27 kg/m²   GEN: No acute distress. Calm, comfortable  HENT: Normocephalic, atraumatic, moist mucous membranes  EYE: Anicteric sclera, non-injected.   CV: Non-diaphoretic. Regular Rate. Radial Pulses 2+.  RESP: Breathing comfortably. Chest expansion symmetric.  EXT: No clubbing, cyanosis.   SKIN: Warm, & dry to palpation. No visible rashes or lesions of exposed skin.   PSYCH: Pleasant mood and appropriate affect. Recent and remote memory intact.   GAIT: Independent ambulation  Neck Exam:       Inspection: No erythema, bruising.       Palpation: (+) TTP of left cervical paraspinals and trapezius. No TTP to midline      ROM:  Limitation in all planes. Pain with lateral bending bilaterally, worse to the right      Provocative Maneuvers:  (+) Spurling's on the left  (+) Phalen's on the left  Shoulder Exam:       Inspection: No erythema, bruising, surgical incisions      Palpation: (+) TTP of left AC joint, proximal biceps      ROM: Limited in abduction, flexion, extension, lateral bending      Provocative Maneuvers:  (+) Hawkin's on left       (+) Cross arm on left   Neurologic Exam:     Alert. Speech is fluent and appropriate.     Strength: 4/5 right hip flexion, elbow flexion and extension, left wrist extension and 4/5 left finger flexion and 5th digit abduction otherwise 5/5 throughout bilateral upper & lower extremities      Sensation: Grossly intact to light touch in bilateral upper & lower extremities but decreased distal to the left wrist and at the bilateral feet     Reflexes: 1+ in b/l biceps, brachioradialis, triceps. Absent achilles. 3+ patellar     Tone: No abnormality appreciated in bilateral upper or lower extremities     No Clonus b/l     Absent  babinski b/l     (+) Castro bilaterally    Imaging:  - EMG/NCS BUE 5/12/22:      - CT Cervical Spine Without Contrast 12/24/2021  Cervical spine CT: Slight dextrocurvature with straightening of the cervical lordosis.  No occipital condylar fracture.  Mild degenerative change at the atlantodental interval.  Dens and lateral masses are otherwise well aligned and intact.  No vertebral acute compression fracture.  Chronic appearing minimal to mild anterior wedge deformity of C3, C4 and C5 vertebral bodies without associated radiolucency.  No displaced fracture, dislocation or significant listhesis.  No prevertebral soft tissue thickening.  No paraspinal mass or fluid collection.  No subcutaneous emphysema or radiodense retained foreign body.  Multilevel degenerative disc disease with marginal osteophytes and uncovertebral and facet arthrosis most prominent at C3-4 through C6-7 levels.  C2-3: Posterior disc osteophyte complex without significant acquired canal stenosis.  No significant neural foraminal narrowing.  C3-4: Posterior disc osteophyte complex resulting in mild acquired canal stenosis.  Moderate to severe bilateral neural foraminal narrowing, right greater than left.  C4-5: Posterior disc osteophyte complex resulting in mild to moderate acquired canal stenosis.  Moderate to severe right and severe left neural foraminal narrowing.  C5-6: Posterior disc osteophyte complex resulting in mild to moderate acquired canal stenosis.  Severe bilateral neural foraminal narrowing, left more so than the right.  C6-7: Posterior disc osteophyte complex resulting in mild to moderate acquired canal stenosis.  Moderate to severe bilateral neural foraminal narrowing.  C7-T1: Posterior disc osteophyte complex resulting in minimal acquired canal stenosis.  Minimal bilateral neural foraminal narrowing.   Included airway is midline and patent.  Minimal biapical pleuroparenchymal scarring and paraseptal emphysematous change.  There  is a 4 mm calcified granuloma within the left upper lobe.  There is a 3 mm solid nodule within the left upper lobe.  Impression:  1. Few scattered areas of thin hyperattenuation along the interhemispheric fissure new from head CT of 02/27/2017.  Differential considerations include newly developed extra-axial calcifications along the falx versus trace subdural hemorrhage in the setting of recent trauma.  Clinical correlation advised.  Further evaluation with MRI brain can be obtained as warranted.  2. Otherwise no acute large vascular territory infarct or parenchymal hemorrhage seen.  3. Supratentorial white matter suspected advanced chronic microvascular ischemic change.  4. No CT evidence of cervical spine acute osseous traumatic injury.  5. Cervical spondylosis most prominent at C3-4 through C6-7 levels as above, progressed since cervical spine CT in 2014.  6. Left upper lobe 3 mm solid pulmonary nodule.  For a solid nodule <6 mm, Fleischner Society 2017 guidelines recommend no routine follow up for a low risk patient, or follow-up with non-contrast chest CT at 12 months in a high risk patient.  This report was flagged in Epic as abnormal.      - XR SHOULDER TRAUMA 3 VIEW LEFT 2/24/2022:  Bones: No fracture.  No lytic or blastic lesion.  Joints: No evidence for glenohumeral dislocation.  Acromioclavicular joint is unremarkable.  Soft tissues: Unremarkable.  Impression:  No acute fracture.      Labs:  BMP  Lab Results   Component Value Date     02/24/2022    K 3.8 02/24/2022     02/24/2022    CO2 26 02/24/2022    BUN 13 02/24/2022    CREATININE 1.1 02/24/2022    CALCIUM 9.6 02/24/2022    ANIONGAP 11 02/24/2022    ESTGFRAFRICA >60.0 02/24/2022    EGFRNONAA >60.0 02/24/2022     Lab Results   Component Value Date    ALT 31 02/24/2022    AST 25 02/24/2022     (H) 04/24/2017    ALKPHOS 93 02/24/2022    BILITOT 0.5 02/24/2022     Lab Results   Component Value Date     (L) 02/24/2022     Lab  Results   Component Value Date    HGBA1C 7.4 (H) 01/25/2022       Assessment:  Vick Gonzalez is a 66 y.o. male with the following diagnoses based on history, exam, and imaging:    Problem List Items Addressed This Visit        Neuro    Chronic pain syndrome      Other Visit Diagnoses     Cervical radiculopathy    -  Primary    Relevant Orders    Ambulatory referral/consult to Neurosurgery    Spinal stenosis, cervical region        Relevant Orders    Ambulatory referral/consult to Neurosurgery    Left cervical radiculopathy        Chronic neck pain        Castro's reflex positive        S/P lumbar spinal fusion        Postlaminectomy syndrome of lumbar region              This is a pleasant 66 y.o. gentleman presenting with:     - Chronic Neck Pain and left upper extremity/hand pain: Pain is multifactorial. Prior EMG in media section reveals b/l carpal tunnel syndrome, ulnar neuropathy at the wrist b/l, and chronic b/l C7>C5, C6 radiculopathies. Physical exam is concerning for potential cervical myelopathy.   - Left shoulder pain: TTP over left proximal biceps tendon and AC joint and also likely contributions from cervical spine.   - Diabetic Peripheral polyneuropathy  - Chronic Pain Syndrome: Prior history of management of chronic pain with chronic opioid use, but failed UDS for cocaine and soma and was tapered off of medications. H/o substance, hep C and liver transplant. I do not think he is a good candidate for chronic opioids, and he does not seem to be drug seeking at this time and more looking for pain relief.   - Comorbidities: History of substance abuse (cocaine, soma). T2DM, liver transplant s/p ETOH abuse and Hep C s/p treatment, HTN, PAD, HLD, hypothyroidism, Gout. PUD    07/06/[id number]-wtzu-old male with a history of chronic neck pain and left arm radiculopathy.  Does have a history of bilateral carpal tunnel syndrome ulnar neuropathy at the wrist bilaterally and chronic bilateral C7 radiculopathy.   His recent cervical MRI shows degenerative changes from C3 through C7, it also shows myelomalacia at C4-C5, C5-C6 and C6-C7.  Patient declined any injections today reports wanting to get this fixed.  Today I recommended he see Neurosurgery for further evaluation patient agreed understood.    Treatment Plan:   - PT/OT/HEP: Patient declined PT today.  Discussed benefits of exercise for pain.   - Procedures: declined procedures today  - Medications: No changes recommended at this time.  - Imaging: Reviewed.   - Labs: Reviewed.  Medications are appropriately dosed for current hepatorenal function.  -referral to nsgy today     Follow Up: RTC PRMIKE Mccullough, NP-C  Interventional Pain Management      Disclaimer: This note was partly generated using dictation software which may occasionally result in transcription errors.

## 2022-07-06 NOTE — TELEPHONE ENCOUNTER
Patient came in asking that we send a prescription to [facility] for his diabetic test strips. I called [facility] because the order had been sent on 7/1. I spoke with Darlene, who told me that the patient needs to call [facility] at [phone] and that there is a current order already on file. Called the patient and gave him the phone number to call to order.

## 2022-07-07 ENCOUNTER — TELEPHONE (OUTPATIENT)
Dept: NEUROSURGERY | Facility: CLINIC | Age: 67
End: 2022-07-07
Payer: MEDICARE

## 2022-07-07 NOTE — TELEPHONE ENCOUNTER
Returned pts call. Scheduled appt from referral for 7/12 @ 1pm. Provided directions and address  No further concerns or questions   ----- Message from Leander Eng sent at 7/7/2022  2:01 PM CDT -----  Who called?:PT      What is the request in detail:PT would like to schedule an appointment. Please advise         Can the clinic reply by [facility]?:No        Best Call Back Number: [phone]

## 2022-07-08 ENCOUNTER — TELEPHONE (OUTPATIENT)
Dept: NEUROSURGERY | Facility: CLINIC | Age: 67
End: 2022-07-08
Payer: MEDICARE

## 2022-07-08 DIAGNOSIS — M48.02 CERVICAL SPINAL STENOSIS: Primary | ICD-10-CM

## 2022-07-11 ENCOUNTER — TELEPHONE (OUTPATIENT)
Dept: FAMILY MEDICINE | Facility: CLINIC | Age: 67
End: 2022-07-11
Payer: MEDICARE

## 2022-07-11 NOTE — TELEPHONE ENCOUNTER
----- Message from Scott Haq sent at 7/8/2022  3:35 PM CDT -----  Contact: pt  Type: Requesting to speak with nurse        Who Called: PT  Regarding: would like a cal1 back. Need an order put in for a glucose monitor.  Would the patient rather a call back or a response via [facility]? Call back  Best Call Back Number: [phone]  Additional Information:

## 2022-07-12 ENCOUNTER — OFFICE VISIT (OUTPATIENT)
Dept: NEUROSURGERY | Facility: CLINIC | Age: 67
End: 2022-07-12
Payer: MEDICARE

## 2022-07-12 ENCOUNTER — HOSPITAL ENCOUNTER (OUTPATIENT)
Dept: RADIOLOGY | Facility: HOSPITAL | Age: 67
Discharge: HOME | End: 2022-07-12
Attending: NEUROLOGICAL SURGERY
Payer: MEDICARE

## 2022-07-12 ENCOUNTER — LAB VISIT (OUTPATIENT)
Dept: LAB | Facility: HOSPITAL | Age: 67
End: 2022-07-12
Attending: INTERNAL MEDICINE
Payer: MEDICARE

## 2022-07-12 VITALS — BODY MASS INDEX: 32.07 KG/M2 | WEIGHT: 257.94 LBS | HEIGHT: 75 IN

## 2022-07-12 DIAGNOSIS — M54.12 CERVICAL RADICULOPATHY: ICD-10-CM

## 2022-07-12 DIAGNOSIS — K74.69 OTHER CIRRHOSIS OF LIVER: ICD-10-CM

## 2022-07-12 DIAGNOSIS — M48.02 SPINAL STENOSIS, CERVICAL REGION: ICD-10-CM

## 2022-07-12 DIAGNOSIS — Z94.4 LIVER REPLACED BY TRANSPLANT: ICD-10-CM

## 2022-07-12 DIAGNOSIS — M48.02 CERVICAL SPINAL STENOSIS: ICD-10-CM

## 2022-07-12 DIAGNOSIS — Z94.4 S/P LIVER TRANSPLANT: ICD-10-CM

## 2022-07-12 LAB
AFP SERPL-MCNC: 4.3 NG/ML (ref 0–8.4)
ALBUMIN SERPL BCP-MCNC: 3.9 G/DL (ref 3.5–5.2)
ALP SERPL-CCNC: 92 U/L (ref 55–135)
ALT SERPL W/O P-5'-P-CCNC: 24 U/L (ref 10–44)
ANION GAP SERPL CALC-SCNC: 10 MMOL/L (ref 8–16)
AST SERPL-CCNC: 15 U/L (ref 10–40)
BASOPHILS # BLD AUTO: 0.03 K/UL (ref 0–0.2)
BASOPHILS NFR BLD: 0.4 % (ref 0–1.9)
BILIRUB SERPL-MCNC: 0.4 MG/DL (ref 0.1–1)
BUN SERPL-MCNC: 24 MG/DL (ref 8–23)
CALCIUM SERPL-MCNC: 9.4 MG/DL (ref 8.7–10.5)
CHLORIDE SERPL-SCNC: 96 MMOL/L (ref 95–110)
CO2 SERPL-SCNC: 26 MMOL/L (ref 23–29)
CREAT SERPL-MCNC: 1.6 MG/DL (ref 0.5–1.4)
DIFFERENTIAL METHOD: ABNORMAL
EOSINOPHIL # BLD AUTO: 0.4 K/UL (ref 0–0.5)
EOSINOPHIL NFR BLD: 4.6 % (ref 0–8)
ERYTHROCYTE [DISTWIDTH] IN BLOOD BY AUTOMATED COUNT: 13.4 % (ref 11.5–14.5)
EST. GFR  (AFRICAN AMERICAN): 51.9 ML/MIN/1.73 M^2
EST. GFR  (NON AFRICAN AMERICAN): 44.9 ML/MIN/1.73 M^2
GLUCOSE SERPL-MCNC: 443 MG/DL (ref 70–110)
HCT VFR BLD AUTO: 38.5 % (ref 40–54)
HGB BLD-MCNC: 12.8 G/DL (ref 14–18)
IMM GRANULOCYTES # BLD AUTO: 0.04 K/UL (ref 0–0.04)
IMM GRANULOCYTES NFR BLD AUTO: 0.5 % (ref 0–0.5)
INR PPP: 1 (ref 0.8–1.2)
LYMPHOCYTES # BLD AUTO: 2.5 K/UL (ref 1–4.8)
LYMPHOCYTES NFR BLD: 32 % (ref 18–48)
MCH RBC QN AUTO: 29.4 PG (ref 27–31)
MCHC RBC AUTO-ENTMCNC: 33.2 G/DL (ref 32–36)
MCV RBC AUTO: 88 FL (ref 82–98)
MONOCYTES # BLD AUTO: 0.6 K/UL (ref 0.3–1)
MONOCYTES NFR BLD: 8.1 % (ref 4–15)
NEUTROPHILS # BLD AUTO: 4.3 K/UL (ref 1.8–7.7)
NEUTROPHILS NFR BLD: 54.4 % (ref 38–73)
NRBC BLD-RTO: 0 /100 WBC
PLATELET # BLD AUTO: 127 K/UL (ref 150–450)
PMV BLD AUTO: 14.3 FL (ref 9.2–12.9)
POTASSIUM SERPL-SCNC: 4.4 MMOL/L (ref 3.5–5.1)
PROT SERPL-MCNC: 7.1 G/DL (ref 6–8.4)
PROTHROMBIN TIME: 10.3 SEC (ref 9–12.5)
RBC # BLD AUTO: 4.36 M/UL (ref 4.6–6.2)
SODIUM SERPL-SCNC: 132 MMOL/L (ref 136–145)
WBC # BLD AUTO: 7.82 K/UL (ref 3.9–12.7)

## 2022-07-12 PROCEDURE — 3066F NEPHROPATHY DOC TX: CPT | Mod: CPTII,S$GLB,, | Performed by: NEUROLOGICAL SURGERY

## 2022-07-12 PROCEDURE — 99205 PR OFFICE/OUTPT VISIT, NEW, LEVL V, 60-74 MIN: ICD-10-PCS | Mod: S$GLB,,, | Performed by: NEUROLOGICAL SURGERY

## 2022-07-12 PROCEDURE — 3008F PR BODY MASS INDEX (BMI) DOCUMENTED: ICD-10-PCS | Mod: CPTII,S$GLB,, | Performed by: NEUROLOGICAL SURGERY

## 2022-07-12 PROCEDURE — 3066F PR DOCUMENTATION OF TREATMENT FOR NEPHROPATHY: ICD-10-PCS | Mod: CPTII,S$GLB,, | Performed by: NEUROLOGICAL SURGERY

## 2022-07-12 PROCEDURE — 85610 PROTHROMBIN TIME: CPT | Performed by: INTERNAL MEDICINE

## 2022-07-12 PROCEDURE — 1159F MED LIST DOCD IN RCRD: CPT | Mod: CPTII,S$GLB,, | Performed by: NEUROLOGICAL SURGERY

## 2022-07-12 PROCEDURE — 3051F PR MOST RECENT HEMOGLOBIN A1C LEVEL 7.0 - < 8.0%: ICD-10-PCS | Mod: CPTII,S$GLB,, | Performed by: NEUROLOGICAL SURGERY

## 2022-07-12 PROCEDURE — 82105 ALPHA-FETOPROTEIN SERUM: CPT | Performed by: INTERNAL MEDICINE

## 2022-07-12 PROCEDURE — 72040 XR CERVICAL SPINE AP LATERAL: ICD-10-PCS | Mod: 26,,, | Performed by: RADIOLOGY

## 2022-07-12 PROCEDURE — 4010F ACE/ARB THERAPY RXD/TAKEN: CPT | Mod: CPTII,S$GLB,, | Performed by: NEUROLOGICAL SURGERY

## 2022-07-12 PROCEDURE — 36415 COLL VENOUS BLD VENIPUNCTURE: CPT | Mod: PO | Performed by: INTERNAL MEDICINE

## 2022-07-12 PROCEDURE — 3060F POS MICROALBUMINURIA REV: CPT | Mod: CPTII,S$GLB,, | Performed by: NEUROLOGICAL SURGERY

## 2022-07-12 PROCEDURE — 99205 OFFICE O/P NEW HI 60 MIN: CPT | Mod: S$GLB,,, | Performed by: NEUROLOGICAL SURGERY

## 2022-07-12 PROCEDURE — 3060F PR POS MICROALBUMINURIA RESULT DOCUMENTED/REVIEW: ICD-10-PCS | Mod: CPTII,S$GLB,, | Performed by: NEUROLOGICAL SURGERY

## 2022-07-12 PROCEDURE — 4010F PR ACE/ARB THEARPY RXD/TAKEN: ICD-10-PCS | Mod: CPTII,S$GLB,, | Performed by: NEUROLOGICAL SURGERY

## 2022-07-12 PROCEDURE — 80197 ASSAY OF TACROLIMUS: CPT | Performed by: INTERNAL MEDICINE

## 2022-07-12 PROCEDURE — 85025 COMPLETE CBC W/AUTO DIFF WBC: CPT | Performed by: INTERNAL MEDICINE

## 2022-07-12 PROCEDURE — 3008F BODY MASS INDEX DOCD: CPT | Mod: CPTII,S$GLB,, | Performed by: NEUROLOGICAL SURGERY

## 2022-07-12 PROCEDURE — 80053 COMPREHEN METABOLIC PANEL: CPT | Performed by: INTERNAL MEDICINE

## 2022-07-12 PROCEDURE — 72040 X-RAY EXAM NECK SPINE 2-3 VW: CPT | Mod: TC,PN

## 2022-07-12 PROCEDURE — 3051F HG A1C>EQUAL 7.0%<8.0%: CPT | Mod: CPTII,S$GLB,, | Performed by: NEUROLOGICAL SURGERY

## 2022-07-12 PROCEDURE — 1159F PR MEDICATION LIST DOCUMENTED IN MEDICAL RECORD: ICD-10-PCS | Mod: CPTII,S$GLB,, | Performed by: NEUROLOGICAL SURGERY

## 2022-07-12 PROCEDURE — 72040 X-RAY EXAM NECK SPINE 2-3 VW: CPT | Mod: 26,,, | Performed by: RADIOLOGY

## 2022-07-12 PROCEDURE — 99999 PR PBB SHADOW E&M-EST. PATIENT-LVL V: CPT | Mod: PBBFAC,,, | Performed by: NEUROLOGICAL SURGERY

## 2022-07-12 PROCEDURE — 99999 PR PBB SHADOW E&M-EST. PATIENT-LVL V: ICD-10-PCS | Mod: PBBFAC,,, | Performed by: NEUROLOGICAL SURGERY

## 2022-07-13 ENCOUNTER — TELEPHONE (OUTPATIENT)
Dept: TRANSPLANT | Facility: CLINIC | Age: 67
End: 2022-07-13
Payer: MEDICARE

## 2022-07-13 LAB — TACROLIMUS BLD-MCNC: 2.6 NG/ML (ref 5–15)

## 2022-07-13 NOTE — TELEPHONE ENCOUNTER
Spoke with pt. He states he didn't take his insulin for a couple of days, but states he is back on it and blood sugars have normalized. He states he understands he should not miss any medication doses. He agreed to push fluids, stay on medications as prescribed by his doctors and he will repeat labs 7/25/22.  ----- Message from James Coleman MD sent at 7/13/2022 12:29 AM CDT -----  Can you check he is okay- high glucose and creatinine higher- oral hydration and repeat next week  Results reviewed

## 2022-07-21 ENCOUNTER — TELEPHONE (OUTPATIENT)
Dept: NEUROSURGERY | Facility: CLINIC | Age: 67
End: 2022-07-21
Payer: MEDICARE

## 2022-07-21 NOTE — TELEPHONE ENCOUNTER
Called & spoke w/ pt. Made him aware of the ACDF Sx offering. Pt scheduled for 7/26 @ [facility] 10:40am w/ Dr. Christian. Pt stated he would like to wait on scheduling the surgery until he speaks with Dr. Christian next Tuesday. Address & direction provided.    No further questions or concerns voiced.   ----- Message from Titi Christian MD sent at 7/21/2022 12:58 PM CDT -----  Contact: [phone]  Please let him know that after reviewing his EMG, MRI and CT, I will offer an ACDF 3 level.  Please offer an appointment to discuss, which could be virtual.        ----- Message -----  From: Chrystal Chan MA  Sent: 7/19/2022   4:36 PM CDT  To: Titi Christian MD    Hey!    Tried to dig into this pt; he's a [facility] pt and the note wasn't completed for his visit, not sure what was discussed or what pain order since it wasn't placed.    Let me know and ill work on it!  Thanks,  Chrystal SONI  ----- Message -----  From: Merlin Moody  Sent: 7/19/2022   3:37 PM CDT  To: [facility] Staff    Who Called: PT  Regarding: neck shot   Would the patient rather a call back or a response via [facility]? Call back  Best Call Back Number: [phone]   Additional Information: n/a

## 2022-07-21 NOTE — PROGRESS NOTES
NEUROSURGICAL OUTPATIENT CONSULTATION NOTE    DATE OF SERVICE:  07/21/2022    ATTENDING PHYSICIAN:  Titi Christian MD    CONSULT REQUESTED BY:  Bony Mccullough     REASON FOR CONSULT:  Left shoulder and arm pain    HISTORY OF PRESENT ILLNESS:  This is a very pleasant 66 y.o. male who is referred with the above symptoms.    He is s/p L CTR with continued left arm pain.  EMG ->   1. B CTS, mild   2. B ulnar neuropathy at wrist, mod   3. Chronic C7 > C5, C6 radiculopathies  Denies LE clumsiness or B/B dysfunction    PAST MEDICAL HISTORY:  Active Ambulatory Problems     Diagnosis Date Noted    Chronic pain syndrome 07/13/2011    Hypothyroidism     History of hepatitis C, s/p successful Rx w/ SVR24 (cure) - 5/2018 08/23/2011    Gout, unspecified 03/14/2011    Mononeuritis of unspecified site 07/15/2010    Alcohol abuse, in remission 06/02/2010    Anxiety     Lumbar radiculopathy 04/08/2015    Acquired spondylolisthesis 05/12/2015    Essential hypertension 06/19/2015    Diabetes mellitus type II, uncontrolled 10/04/2016    S/P liver transplant 10/04/2016    Hypertriglyceridemia 10/05/2016    CKD (chronic kidney disease), stage III     Ischemic leg 08/30/2017    PAD (peripheral artery disease) 08/30/2017    Erectile dysfunction due to arterial insufficiency 10/09/2017    BPH with urinary obstruction 10/09/2017    Immunosuppressed status 10/16/2017    Low testosterone in male 07/25/2018    Sacroiliitis 12/02/2019    Abdominal pain 01/23/2020    SBO (small bowel obstruction)     Transaminitis 11/11/2020    Right lower quadrant abdominal pain 11/12/2020    Diarrhea 11/13/2020    Hypertension associated with diabetes 12/01/2020    Hyperlipidemia associated with type 2 diabetes mellitus 12/01/2020    Erectile dysfunction associated with type 2 diabetes mellitus 12/01/2020    Claudication in peripheral vascular disease 12/01/2020    Severe obesity (BMI 35.0-39.9) with comorbidity 12/01/2020     Aortic atherosclerosis 11/16/2010    Calcified granuloma of lung 10/22/2010    Diabetic polyneuropathy associated with type 2 diabetes mellitus 12/01/2020    Carpal tunnel syndrome of left wrist 07/13/2021    Penetrating foot wound 10/11/2021    Diabetic foot infection 10/11/2021    Left hand weakness 02/01/2022    Fine motor impairment 02/14/2022    Loss of feeling or sensation 02/14/2022     Resolved Ambulatory Problems     Diagnosis Date Noted    Abdominal pain, generalized 08/25/2011    Abdominal pain, right upper quadrant 09/16/2010    Abdominal tenderness, right upper quadrant 05/15/2011    Unspecified chronic liver disease without mention of alcohol     Hypertension     Acute alcoholic hepatitis 08/02/2010    Acute bronchitis 03/14/2011    Acute gouty arthropathy 08/28/2011    Aftercare following organ transplant 06/17/2010    Alcoholic cirrhosis of liver 10/11/2010    Anemia of other chronic disease 07/15/2010    Cholangitis 07/15/2010    Chronic hepatitis C with hepatic coma 06/08/2011    Cirrhosis of liver without mention of alcohol 07/28/2011    Complications of transplanted liver 05/11/2011    Dietary surveillance and counseling 07/28/2011    Encephalopathy, unspecified 06/16/2010    Family history of diabetes mellitus 07/15/2010    Hematuria, unspecified 02/11/2012    Hepatomegaly 08/23/2011    Liver replaced by transplant 08/23/2011    Encounter for long-term (current) use of steroids 06/02/2011    Encounter for long-term (current) use of aspirin 01/16/2011    Microscopic hematuria 05/11/2011    Need for prophylactic immunotherapy 06/02/2011    Nocturia 11/02/2010    Obstruction of bile duct 10/24/2010    Open wound of finger(s) , without mention of complication 07/12/2011    Other and unspecified alcohol dependence, unspecified drinking behavior 01/16/2011    Other ascites 06/16/2010    Other cells and casts in urine 11/02/2010    Other sequelae of chronic liver  disease 07/28/2011    Other specified disorders of biliary tract 10/23/2010    Other specified disorders of liver 09/26/2010    Other, mixed, or unspecified nondependent drug abuse, unspecified 10/24/2010    Peptic ulcer, unspecified site, unspecified as acute or chronic, without mention of hemorrhage, perforation, or obstruction 10/20/2010    Personal history of other infectious and parasitic disease 11/15/2010    Portal hypertension 07/26/2010    Secondary diabetes mellitus without mention of complication, uncontrolled 10/24/2010    Unspecified disorder of male genital organs 11/02/2010    Genital herpes, unspecified 06/02/2011    Orchitis and epididymitis, unspecified 10/12/2010    Thrombocytopenia, unspecified 06/16/2010    Unspecified viral hepatitis C without hepatic coma 10/20/2010    Hepatitis C 12/10/2012    Genital herpes     Hyperpigmentation 10/21/2014    Diskitis 01/15/2015    Lower back pain 01/15/2015    Discitis of lumbosacral region 01/16/2015    Lumbar discitis 06/15/2015    Lumbar myelopathy 06/18/2015    Abnormal gait 07/09/2015    Muscle weakness 07/09/2015    LBP (low back pain) 07/09/2015    S/P lumbar spinal fusion 08/11/2015    Hyponatremia 10/04/2016    OSMANY (acute kidney injury) 10/04/2016    Volume depletion 02/28/2017    Sepsis 11/11/2020     Past Medical History:   Diagnosis Date    Anemia     ED (erectile dysfunction)     Encounter for blood transfusion     Gout, arthritis     History of alcohol abuse     History of positive PPD, treatment status unknown     History of substance abuse     Pancreatitis 2016    Peptic ulcer disease        PAST SURGICAL HISTORY:  Past Surgical History:   Procedure Laterality Date    CARPAL TUNNEL RELEASE Left 10/29/2021    Procedure: RELEASE, CARPAL TUNNEL;  Surgeon: Jameson Alejo Jr., MD;  Location: [facility];  Service: Orthopedics;  Laterality: Left;    CHOLECYSTECTOMY      INJECTION OF JOINT Right 12/2/2019     Procedure: INJECTION, JOINT SI;  Surgeon: Thu Penn MD;  Location: [facility]IN [facility];  Service: Pain Management;  Laterality: Right;  RT SI JNT INJ    LIVER TRANSPLANT  06/2010    SPINE SURGERY         SOCIAL HISTORY:   Social History     Socioeconomic History    Marital status:    Tobacco Use    Smoking status: Former Smoker    Smokeless tobacco: Never Used   Substance and Sexual Activity    Alcohol use: No     Comment: over 5 years ago, none currently    Drug use: Not Currently     Comment: Former cocaine use    Sexual activity: Yes     Social Determinants of Health     Financial Resource Strain: Low Risk     Difficulty of Paying Living Expenses: Not very hard   Food Insecurity: No Food Insecurity    Worried About Running Out of Food in the Last Year: Never true    Ran Out of Food in the Last Year: Never true   Transportation Needs: No Transportation Needs    Lack of Transportation (Medical): No    Lack of Transportation (Non-Medical): No   Housing Stability: Unknown    Unable to Pay for Housing in the Last Year: No    Unstable Housing in the Last Year: No       FAMILY HISTORY:  Family History   Problem Relation Age of Onset    Cancer Mother     Diabetes Mother     Heart disease Mother     Diabetes Sister     Cancer Maternal Uncle 82        colon CA    Drug abuse Daughter     Melanoma Neg Hx     Psoriasis Neg Hx     Lupus Neg Hx     Eczema Neg Hx     Acne Neg Hx        CURRENTS MEDICATIONS:  Current Outpatient Medications on File Prior to Visit   Medication Sig Dispense Refill    albuterol (VENTOLIN HFA) 90 mcg/actuation inhaler Inhale 2 puffs into the lungs every 6 (six) hours as needed for Wheezing or Shortness of Breath. Rescue 8.5 g 1    allopurinoL (ZYLOPRIM) 100 MG tablet TAKE 1 TABLET BY MOUTH TWICE A DAY (Patient taking differently: Take 100 mg by mouth 2 (two) times a day.) 60 tablet 7    aluminum-magnesium hydroxide-simethicone (MAALOX) 200-200-20 mg/5 mL Susp Take 30  "mLs by mouth 4 (four) times daily before meals and nightly. 769 mL 0    BD ULTRA-FINE MENDY PEN NEEDLES 32 gauge x 5/32" Ndle       blood sugar diagnostic (TRUE METRIX GLUCOSE TEST STRIP) Strp USE 3 TIMES DAILY TO TEST BLOOD GLUCOSE LEVEL 100 strip 11    calcium carbonate-vitamin D3 (CALCIUM 600 WITH VITAMIN D3) 600 mg(1,500mg) -400 unit Chew Take 1 tablet by mouth once daily.       flash glucose sensor (FREESTYLE PALLAVI 2 SENSOR) Kit One sensor every 14 days 2 kit prn    gabapentin (NEURONTIN) 300 MG capsule Take 1 capsule (300 mg total) by mouth every evening. 30 capsule 2    gabapentin (NEURONTIN) 800 MG tablet TAKE 1 TABLET BY MOUTH THREE TIMES A DAY 90 tablet 3    glucose 4 GM chewable tablet Take 4 tablets (16 g total) by mouth as needed for Low blood sugar.  12    glucose 4 GM chewable tablet Take 4 tablets (16 g total) by mouth as needed for Low blood sugar (If having symptoms of blurry vision, palpitations, confusion, shakiness.  Please check sugars and if sugar below 70 please take 4 tablets and re-check sugar everry 15 minutes until sugars are above 70 and symptoms resolve.). 50 tablet 12    HYDROcodone-acetaminophen (NORCO) 5-325 mg per tablet 22 tablets.      insulin glargine U-300 conc (TOUJEO MAX U-300 SOLOSTAR) 300 unit/mL (3 mL) insulin pen Inject 40 Units into the skin once daily. 3 mL 11    insulin lispro 100 unit/mL pen Inject 20 Units into the skin 3 (three) times daily with meals. 15 mL 11    lancets 30 gauge Misc 1 lancet by Misc.(Non-Drug; Combo Route) route 4 (four) times daily before meals and nightly. 200 each 11    levothyroxine (SYNTHROID) 88 MCG tablet TAKE 1 TABLET BY MOUTH EVERY DAY (Patient taking differently: Take 88 mcg by mouth before breakfast.) 90 tablet 4    lisinopriL (PRINIVIL,ZESTRIL) 40 MG tablet TAKE 1 TABLET BY MOUTH EVERY DAY 90 tablet 3    metoprolol succinate (TOPROL-XL) 200 MG 24 hr tablet Take 1 tablet (200 mg total) by mouth once daily. 90 tablet 3    " "multivitamin (THERAGRAN) per tablet Take 1 tablet by mouth once daily.       NIFEdipine (ADALAT CC) 60 MG TbSR TAKE 1 TABLET BY MOUTH EVERY DAY 90 tablet 11    NOVOFINE PLUS 32 gauge x 1/6" Ndle       ondansetron (ZOFRAN-ODT) 4 MG TbDL Take 1 tablet (4 mg total) by mouth every 6 (six) hours as needed (Nausea). 15 tablet 0    pen needle, diabetic (BD ULTRA-FINE MENDY PEN NEEDLE) 32 gauge x 5/32" Ndle TEST WITH NOVOLOG THREE TIMES A DAY WITH MEALS AND WITH LEVEMIR AT BEDTIME 100 each 11    rosuvastatin (CRESTOR) 5 MG tablet TAKE 1 TABLET BY MOUTH EVERY DAY 90 tablet 11    sertraline (ZOLOFT) 100 MG tablet TAKE 1 TABLET (100 MG TOTAL) BY MOUTH ONCE DAILY. 30 tablet 7    tacrolimus (PROGRAF) 1 MG Cap TAKE 2 CAPSULES (2 MG TOTAL) BY MOUTH IN THE MORNING & TAKE 1 CAPSULE (1 MG TOTAL) IN THE EVENING. 90 capsule 11    traZODone (DESYREL) 50 MG tablet TAKE 1 TABLET (50 MG TOTAL) BY MOUTH EVERY EVENING. 30 tablet 11    TRUE METRIX GLUCOSE METER Misc TEST 4 (FOUR) TIMES DAILY BEFORE MEALS AND NIGHTLY. 1 each 0    aspirin 81 MG Chew Take 1 tablet (81 mg total) by mouth once daily. 90 tablet 3    [DISCONTINUED] insulin aspart U-100 (NOVOLOG FLEXPEN U-100 INSULIN) 100 unit/mL (3 mL) InPn pen Inject 20 Units into the skin 3 (three) times daily with meals. 15 mL 11     No current facility-administered medications on file prior to visit.       ALLERGIES:  Review of patient's allergies indicates:  No Known Allergies    REVIEW OF SYSTEMS:  ROS - per HPI    OBJECTIVE:    PHYSICAL EXAMINATION:   There were no vitals filed for this visit.    Neurologic Exam     Mental Status   Attention: normal.   Level of consciousness: alert    Motor Exam     Strength   Strength 5/5 throughout.     Sensory Exam   LT diminished left hand, all dermatomes     Gait, Coordination, and Reflexes     Reflexes   Right triceps: 1+  Left triceps: 1+  Right patellar: 1+  Left patellar: 1+  Right Castro: present  Left Castro: present  Right ankle " clonus: absent  Left ankle clonus: absent        DIAGNOSTIC DATA:  I personally interpreted the following imaging:     MRI with severe central stenosis C4-7    CT with severe bony stenosis C4-7    XR good alignment    ASSESMENT:  This is a 66 y.o. male with     Problem List Items Addressed This Visit    None     Visit Diagnoses     Cervical radiculopathy        Spinal stenosis, cervical region              PLAN:  Cervical myelopathy and radiculopathy  Cervical central and NFS.  May be some contribution from ulnar neuropathy as well  He may benefit from ACDF 4-7  His is high risk from a medical standpoint, will need clearance from PCP/transplant        Titi Christian MD, FAANS    Disclaimer: This note was partly generated using dictation software which may occasionally result in transcription errors.

## 2022-07-25 ENCOUNTER — LAB VISIT (OUTPATIENT)
Dept: LAB | Facility: HOSPITAL | Age: 67
End: 2022-07-25
Attending: INTERNAL MEDICINE
Payer: MEDICARE

## 2022-07-25 DIAGNOSIS — Z94.4 LIVER REPLACED BY TRANSPLANT: ICD-10-CM

## 2022-07-25 LAB
ALBUMIN SERPL BCP-MCNC: 3.7 G/DL (ref 3.5–5.2)
ALP SERPL-CCNC: 66 U/L (ref 55–135)
ALT SERPL W/O P-5'-P-CCNC: 15 U/L (ref 10–44)
ANION GAP SERPL CALC-SCNC: 12 MMOL/L (ref 8–16)
AST SERPL-CCNC: 15 U/L (ref 10–40)
BASOPHILS # BLD AUTO: 0.03 K/UL (ref 0–0.2)
BASOPHILS NFR BLD: 0.4 % (ref 0–1.9)
BILIRUB SERPL-MCNC: 0.5 MG/DL (ref 0.1–1)
BUN SERPL-MCNC: 15 MG/DL (ref 8–23)
CALCIUM SERPL-MCNC: 8.8 MG/DL (ref 8.7–10.5)
CHLORIDE SERPL-SCNC: 104 MMOL/L (ref 95–110)
CO2 SERPL-SCNC: 23 MMOL/L (ref 23–29)
CREAT SERPL-MCNC: 1.4 MG/DL (ref 0.5–1.4)
DIFFERENTIAL METHOD: ABNORMAL
EOSINOPHIL # BLD AUTO: 0.4 K/UL (ref 0–0.5)
EOSINOPHIL NFR BLD: 5.3 % (ref 0–8)
ERYTHROCYTE [DISTWIDTH] IN BLOOD BY AUTOMATED COUNT: 13.7 % (ref 11.5–14.5)
EST. GFR  (AFRICAN AMERICAN): >60 ML/MIN/1.73 M^2
EST. GFR  (NON AFRICAN AMERICAN): 52.7 ML/MIN/1.73 M^2
GLUCOSE SERPL-MCNC: 217 MG/DL (ref 70–110)
HCT VFR BLD AUTO: 37.8 % (ref 40–54)
HGB BLD-MCNC: 12.3 G/DL (ref 14–18)
IMM GRANULOCYTES # BLD AUTO: 0.02 K/UL (ref 0–0.04)
IMM GRANULOCYTES NFR BLD AUTO: 0.3 % (ref 0–0.5)
LYMPHOCYTES # BLD AUTO: 3.2 K/UL (ref 1–4.8)
LYMPHOCYTES NFR BLD: 40 % (ref 18–48)
MCH RBC QN AUTO: 29.7 PG (ref 27–31)
MCHC RBC AUTO-ENTMCNC: 32.5 G/DL (ref 32–36)
MCV RBC AUTO: 91 FL (ref 82–98)
MONOCYTES # BLD AUTO: 0.5 K/UL (ref 0.3–1)
MONOCYTES NFR BLD: 6.1 % (ref 4–15)
NEUTROPHILS # BLD AUTO: 3.8 K/UL (ref 1.8–7.7)
NEUTROPHILS NFR BLD: 47.9 % (ref 38–73)
NRBC BLD-RTO: 0 /100 WBC
PLATELET # BLD AUTO: 133 K/UL (ref 150–450)
PMV BLD AUTO: 12.9 FL (ref 9.2–12.9)
POTASSIUM SERPL-SCNC: 4.3 MMOL/L (ref 3.5–5.1)
PROT SERPL-MCNC: 6.6 G/DL (ref 6–8.4)
RBC # BLD AUTO: 4.14 M/UL (ref 4.6–6.2)
SODIUM SERPL-SCNC: 139 MMOL/L (ref 136–145)
WBC # BLD AUTO: 7.93 K/UL (ref 3.9–12.7)

## 2022-07-25 PROCEDURE — 85025 COMPLETE CBC W/AUTO DIFF WBC: CPT | Performed by: INTERNAL MEDICINE

## 2022-07-25 PROCEDURE — 80197 ASSAY OF TACROLIMUS: CPT | Performed by: INTERNAL MEDICINE

## 2022-07-25 PROCEDURE — 80053 COMPREHEN METABOLIC PANEL: CPT | Performed by: INTERNAL MEDICINE

## 2022-07-25 PROCEDURE — 36415 COLL VENOUS BLD VENIPUNCTURE: CPT | Mod: PO | Performed by: INTERNAL MEDICINE

## 2022-07-26 ENCOUNTER — TELEPHONE (OUTPATIENT)
Dept: NEUROSURGERY | Facility: CLINIC | Age: 67
End: 2022-07-26

## 2022-07-26 ENCOUNTER — OFFICE VISIT (OUTPATIENT)
Dept: NEUROSURGERY | Facility: CLINIC | Age: 67
End: 2022-07-26
Payer: MEDICARE

## 2022-07-26 VITALS
SYSTOLIC BLOOD PRESSURE: 164 MMHG | HEIGHT: 75 IN | BODY MASS INDEX: 32.07 KG/M2 | HEART RATE: 76 BPM | WEIGHT: 257.94 LBS | DIASTOLIC BLOOD PRESSURE: 87 MMHG

## 2022-07-26 DIAGNOSIS — M48.02 SPINAL STENOSIS, CERVICAL REGION: ICD-10-CM

## 2022-07-26 DIAGNOSIS — M54.12 CERVICAL RADICULOPATHY: Primary | ICD-10-CM

## 2022-07-26 DIAGNOSIS — G95.9 CERVICAL MYELOPATHY: ICD-10-CM

## 2022-07-26 DIAGNOSIS — M48.02 CERVICAL SPINAL STENOSIS: ICD-10-CM

## 2022-07-26 LAB — TACROLIMUS BLD-MCNC: 6.5 NG/ML (ref 5–15)

## 2022-07-26 PROCEDURE — 3066F NEPHROPATHY DOC TX: CPT | Mod: CPTII,S$GLB,, | Performed by: NEUROLOGICAL SURGERY

## 2022-07-26 PROCEDURE — 99999 PR PBB SHADOW E&M-EST. PATIENT-LVL IV: ICD-10-PCS | Mod: PBBFAC,,, | Performed by: NEUROLOGICAL SURGERY

## 2022-07-26 PROCEDURE — 1159F MED LIST DOCD IN RCRD: CPT | Mod: CPTII,S$GLB,, | Performed by: NEUROLOGICAL SURGERY

## 2022-07-26 PROCEDURE — 4010F PR ACE/ARB THEARPY RXD/TAKEN: ICD-10-PCS | Mod: CPTII,S$GLB,, | Performed by: NEUROLOGICAL SURGERY

## 2022-07-26 PROCEDURE — 3060F POS MICROALBUMINURIA REV: CPT | Mod: CPTII,S$GLB,, | Performed by: NEUROLOGICAL SURGERY

## 2022-07-26 PROCEDURE — 3051F PR MOST RECENT HEMOGLOBIN A1C LEVEL 7.0 - < 8.0%: ICD-10-PCS | Mod: CPTII,S$GLB,, | Performed by: NEUROLOGICAL SURGERY

## 2022-07-26 PROCEDURE — 3008F PR BODY MASS INDEX (BMI) DOCUMENTED: ICD-10-PCS | Mod: CPTII,S$GLB,, | Performed by: NEUROLOGICAL SURGERY

## 2022-07-26 PROCEDURE — 99999 PR PBB SHADOW E&M-EST. PATIENT-LVL IV: CPT | Mod: PBBFAC,,, | Performed by: NEUROLOGICAL SURGERY

## 2022-07-26 PROCEDURE — 99213 OFFICE O/P EST LOW 20 MIN: CPT | Mod: S$GLB,,, | Performed by: NEUROLOGICAL SURGERY

## 2022-07-26 PROCEDURE — 3079F DIAST BP 80-89 MM HG: CPT | Mod: CPTII,S$GLB,, | Performed by: NEUROLOGICAL SURGERY

## 2022-07-26 PROCEDURE — 4010F ACE/ARB THERAPY RXD/TAKEN: CPT | Mod: CPTII,S$GLB,, | Performed by: NEUROLOGICAL SURGERY

## 2022-07-26 PROCEDURE — 3060F PR POS MICROALBUMINURIA RESULT DOCUMENTED/REVIEW: ICD-10-PCS | Mod: CPTII,S$GLB,, | Performed by: NEUROLOGICAL SURGERY

## 2022-07-26 PROCEDURE — 3077F SYST BP >= 140 MM HG: CPT | Mod: CPTII,S$GLB,, | Performed by: NEUROLOGICAL SURGERY

## 2022-07-26 PROCEDURE — 3077F PR MOST RECENT SYSTOLIC BLOOD PRESSURE >= 140 MM HG: ICD-10-PCS | Mod: CPTII,S$GLB,, | Performed by: NEUROLOGICAL SURGERY

## 2022-07-26 PROCEDURE — 1159F PR MEDICATION LIST DOCUMENTED IN MEDICAL RECORD: ICD-10-PCS | Mod: CPTII,S$GLB,, | Performed by: NEUROLOGICAL SURGERY

## 2022-07-26 PROCEDURE — 99213 PR OFFICE/OUTPT VISIT, EST, LEVL III, 20-29 MIN: ICD-10-PCS | Mod: S$GLB,,, | Performed by: NEUROLOGICAL SURGERY

## 2022-07-26 PROCEDURE — 3008F BODY MASS INDEX DOCD: CPT | Mod: CPTII,S$GLB,, | Performed by: NEUROLOGICAL SURGERY

## 2022-07-26 PROCEDURE — 3051F HG A1C>EQUAL 7.0%<8.0%: CPT | Mod: CPTII,S$GLB,, | Performed by: NEUROLOGICAL SURGERY

## 2022-07-26 PROCEDURE — 3066F PR DOCUMENTATION OF TREATMENT FOR NEPHROPATHY: ICD-10-PCS | Mod: CPTII,S$GLB,, | Performed by: NEUROLOGICAL SURGERY

## 2022-07-26 PROCEDURE — 3079F PR MOST RECENT DIASTOLIC BLOOD PRESSURE 80-89 MM HG: ICD-10-PCS | Mod: CPTII,S$GLB,, | Performed by: NEUROLOGICAL SURGERY

## 2022-07-26 NOTE — H&P (VIEW-ONLY)
Please see note from 7/12    Today's visit is a follow-up for discussion of surgical options based on review of the EMG, CT scan and MRI.  We discussed his diagnosis of myeloradiculopathy and recommended treatment of anterior cervical diskectomy and fusion C4 through 7. We discussed the nature of the procedure and reviewed his imaging.  We talked about risks of bleeding, infection, nerve injury, spinal cord injury, pseudoarthrosis, neck pain and failure of symptom improvement.  We discussed that given his history of transplant, bleeding is a significant possibility, and that we would need clearance from his transplant medicine team as well as his primary care.  I spent approximately 20 minutes discussing all these aspects with him.  He is wanting to move forward with surgery.

## 2022-07-29 ENCOUNTER — TELEPHONE (OUTPATIENT)
Dept: FAMILY MEDICINE | Facility: CLINIC | Age: 67
End: 2022-07-29
Payer: MEDICARE

## 2022-07-29 NOTE — TELEPHONE ENCOUNTER
----- Message from Chrystal Chan MA sent at 7/28/2022  4:16 PM CDT -----  Regarding: PCP Clearance  Good afternoon!    Pt is set to have ACDF 4-7 on August 22 nd with Dr. Titi Christian. Pt needs PCP Clearance appt as well as the following testing:    Labs: CBC, CMP, PT/PTT  EKG  Chest X-Ray  Urinalysis    Please contact pt to schedule.     Thank you!!  Chrystal SONI

## 2022-08-01 ENCOUNTER — TELEPHONE (OUTPATIENT)
Dept: TRANSPLANT | Facility: CLINIC | Age: 67
End: 2022-08-01
Payer: MEDICARE

## 2022-08-01 DIAGNOSIS — Z94.4 S/P LIVER TRANSPLANT: Primary | ICD-10-CM

## 2022-08-01 NOTE — LETTER
August 1, 2022    Vick Gonzalez  [address] [facility] [address]          Dear Vick Gonzalez:  MRN: [id number]    This is a follow up to your recent labs, your lab results were stable.  There are no medicine changes.  Please have your labs drawn again on 10/24/22.      If you cannot have your labs drawn on the scheduled date, it is your responsibility to call the transplant department to reschedule.  Please call [phone] and ask to speak to [facility] - Medical Assistant for all scheduling requests.     Sincerely,    K[facility]  [facility] [place] [address]  [phone]

## 2022-08-01 NOTE — TELEPHONE ENCOUNTER
Letter sent, labs stable and no medication changes are needed. Repeat labs due 10/24/22 per protocol.  ----- Message from James Coleman MD sent at 8/1/2022 12:56 PM CDT -----  Results reviewed

## 2022-08-03 DIAGNOSIS — E11.9 TYPE 2 DIABETES MELLITUS WITHOUT COMPLICATION: ICD-10-CM

## 2022-08-12 ENCOUNTER — TELEPHONE (OUTPATIENT)
Dept: NEUROSURGERY | Facility: CLINIC | Age: 67
End: 2022-08-12
Payer: MEDICARE

## 2022-08-12 NOTE — TELEPHONE ENCOUNTER
----- Message from Kandy Herrera RN sent at 8/12/2022  8:43 AM CDT -----  Regarding: FW: Sx Clearance    ----- Message -----  From: James Coleman MD  Sent: 8/12/2022   8:41 AM CDT  To: Kandy Herrera RN  Subject: RE: Sx Clearance                                 Yes they can proceed  GT  ----- Message -----  From: Kandy Herrera RN  Sent: 7/29/2022   8:53 AM CDT  To: James Coleman MD  Subject: FW: Sx Clearance                                 Is patient ok to proceed?    ----- Message -----  From: Nimco Rico MA  Sent: 7/29/2022   8:38 AM CDT  To: [facility]  Subject: FW: Sx Clearance                                   ----- Message -----  From: Chrystal Chan MA  Sent: 7/28/2022   4:16 PM CDT  To: [facility] Staff  Subject: Sx Clearance                                     Good afternoon!    Pt is set to have ACDF 4-7 w/ Dr. Titi Christian on August 22nd,2022. We are reaching out as Dr. Christian has requested Transplant Clearance.    Please contact pt if appt is needed for the Clearance.    Thank you so much!  Chrystal SONI

## 2022-08-16 ENCOUNTER — HOSPITAL ENCOUNTER (OUTPATIENT)
Dept: PREADMISSION TESTING | Facility: HOSPITAL | Age: 67
Discharge: HOME | End: 2022-08-16
Attending: NEUROLOGICAL SURGERY
Payer: MEDICARE

## 2022-08-16 ENCOUNTER — ANESTHESIA EVENT (OUTPATIENT)
Dept: SURGERY | Facility: HOSPITAL | Age: 67
End: 2022-08-16
Payer: MEDICARE

## 2022-08-16 ENCOUNTER — OFFICE VISIT (OUTPATIENT)
Dept: FAMILY MEDICINE | Facility: CLINIC | Age: 67
End: 2022-08-16
Payer: MEDICARE

## 2022-08-16 VITALS
HEART RATE: 84 BPM | OXYGEN SATURATION: 97 % | HEIGHT: 75 IN | RESPIRATION RATE: 16 BRPM | TEMPERATURE: 97 F | WEIGHT: 266 LBS | SYSTOLIC BLOOD PRESSURE: 146 MMHG | DIASTOLIC BLOOD PRESSURE: 76 MMHG | BODY MASS INDEX: 33.07 KG/M2

## 2022-08-16 VITALS
SYSTOLIC BLOOD PRESSURE: 124 MMHG | HEART RATE: 81 BPM | WEIGHT: 267 LBS | DIASTOLIC BLOOD PRESSURE: 72 MMHG | HEIGHT: 75 IN | BODY MASS INDEX: 33.2 KG/M2 | OXYGEN SATURATION: 97 % | TEMPERATURE: 98 F

## 2022-08-16 DIAGNOSIS — M46.1 SACROILIITIS: ICD-10-CM

## 2022-08-16 DIAGNOSIS — E78.5 HYPERLIPIDEMIA ASSOCIATED WITH TYPE 2 DIABETES MELLITUS: ICD-10-CM

## 2022-08-16 DIAGNOSIS — G95.9 CERVICAL MYELOPATHY: Primary | ICD-10-CM

## 2022-08-16 DIAGNOSIS — E11.65 UNCONTROLLED TYPE 2 DIABETES MELLITUS WITH HYPERGLYCEMIA: ICD-10-CM

## 2022-08-16 DIAGNOSIS — D69.6 THROMBOCYTOPENIA: ICD-10-CM

## 2022-08-16 DIAGNOSIS — J84.10 CALCIFIED GRANULOMA OF LUNG: ICD-10-CM

## 2022-08-16 DIAGNOSIS — I10 HYPERTENSION, UNSPECIFIED TYPE: ICD-10-CM

## 2022-08-16 DIAGNOSIS — D84.9 IMMUNOSUPPRESSION: ICD-10-CM

## 2022-08-16 DIAGNOSIS — E11.42 DIABETIC POLYNEUROPATHY ASSOCIATED WITH TYPE 2 DIABETES MELLITUS: ICD-10-CM

## 2022-08-16 DIAGNOSIS — E11.69 HYPERLIPIDEMIA ASSOCIATED WITH TYPE 2 DIABETES MELLITUS: ICD-10-CM

## 2022-08-16 DIAGNOSIS — Z94.4 S/P LIVER TRANSPLANT: ICD-10-CM

## 2022-08-16 DIAGNOSIS — Z01.818 PRE-OP TESTING: Primary | ICD-10-CM

## 2022-08-16 DIAGNOSIS — N18.31 STAGE 3A CHRONIC KIDNEY DISEASE: ICD-10-CM

## 2022-08-16 DIAGNOSIS — Z01.818 PREOP EXAMINATION: ICD-10-CM

## 2022-08-16 DIAGNOSIS — I70.0 AORTIC ATHEROSCLEROSIS: ICD-10-CM

## 2022-08-16 PROBLEM — E66.01 SEVERE OBESITY (BMI 35.0-39.9) WITH COMORBIDITY: Status: RESOLVED | Noted: 2020-12-01 | Resolved: 2022-08-16

## 2022-08-16 PROCEDURE — 3074F PR MOST RECENT SYSTOLIC BLOOD PRESSURE < 130 MM HG: ICD-10-PCS | Mod: CPTII,S$GLB,, | Performed by: FAMILY MEDICINE

## 2022-08-16 PROCEDURE — 3060F PR POS MICROALBUMINURIA RESULT DOCUMENTED/REVIEW: ICD-10-PCS | Mod: CPTII,S$GLB,, | Performed by: FAMILY MEDICINE

## 2022-08-16 PROCEDURE — 99999 PR PBB SHADOW E&M-EST. PATIENT-LVL V: ICD-10-PCS | Mod: PBBFAC,,, | Performed by: FAMILY MEDICINE

## 2022-08-16 PROCEDURE — 4010F PR ACE/ARB THEARPY RXD/TAKEN: ICD-10-PCS | Mod: CPTII,S$GLB,, | Performed by: FAMILY MEDICINE

## 2022-08-16 PROCEDURE — 93005 EKG 12-LEAD: ICD-10-PCS | Mod: S$GLB,,, | Performed by: FAMILY MEDICINE

## 2022-08-16 PROCEDURE — 1159F MED LIST DOCD IN RCRD: CPT | Mod: CPTII,S$GLB,, | Performed by: FAMILY MEDICINE

## 2022-08-16 PROCEDURE — 99499 UNLISTED E&M SERVICE: CPT | Mod: S$GLB,,, | Performed by: FAMILY MEDICINE

## 2022-08-16 PROCEDURE — 3074F SYST BP LT 130 MM HG: CPT | Mod: CPTII,S$GLB,, | Performed by: FAMILY MEDICINE

## 2022-08-16 PROCEDURE — 3008F BODY MASS INDEX DOCD: CPT | Mod: CPTII,S$GLB,, | Performed by: FAMILY MEDICINE

## 2022-08-16 PROCEDURE — 3066F NEPHROPATHY DOC TX: CPT | Mod: CPTII,S$GLB,, | Performed by: FAMILY MEDICINE

## 2022-08-16 PROCEDURE — 93010 ELECTROCARDIOGRAM REPORT: CPT | Mod: S$GLB,,, | Performed by: INTERNAL MEDICINE

## 2022-08-16 PROCEDURE — 93005 ELECTROCARDIOGRAM TRACING: CPT | Mod: S$GLB,,, | Performed by: FAMILY MEDICINE

## 2022-08-16 PROCEDURE — 4010F ACE/ARB THERAPY RXD/TAKEN: CPT | Mod: CPTII,S$GLB,, | Performed by: FAMILY MEDICINE

## 2022-08-16 PROCEDURE — 99215 OFFICE O/P EST HI 40 MIN: CPT | Mod: 25,S$GLB,, | Performed by: FAMILY MEDICINE

## 2022-08-16 PROCEDURE — 3060F POS MICROALBUMINURIA REV: CPT | Mod: CPTII,S$GLB,, | Performed by: FAMILY MEDICINE

## 2022-08-16 PROCEDURE — 93010 EKG 12-LEAD: ICD-10-PCS | Mod: S$GLB,,, | Performed by: INTERNAL MEDICINE

## 2022-08-16 PROCEDURE — 3078F PR MOST RECENT DIASTOLIC BLOOD PRESSURE < 80 MM HG: ICD-10-PCS | Mod: CPTII,S$GLB,, | Performed by: FAMILY MEDICINE

## 2022-08-16 PROCEDURE — 3066F PR DOCUMENTATION OF TREATMENT FOR NEPHROPATHY: ICD-10-PCS | Mod: CPTII,S$GLB,, | Performed by: FAMILY MEDICINE

## 2022-08-16 PROCEDURE — 99499 RISK ADDL DX/OHS AUDIT: ICD-10-PCS | Mod: S$GLB,,, | Performed by: FAMILY MEDICINE

## 2022-08-16 PROCEDURE — 3008F PR BODY MASS INDEX (BMI) DOCUMENTED: ICD-10-PCS | Mod: CPTII,S$GLB,, | Performed by: FAMILY MEDICINE

## 2022-08-16 PROCEDURE — 3051F HG A1C>EQUAL 7.0%<8.0%: CPT | Mod: CPTII,S$GLB,, | Performed by: FAMILY MEDICINE

## 2022-08-16 PROCEDURE — 3078F DIAST BP <80 MM HG: CPT | Mod: CPTII,S$GLB,, | Performed by: FAMILY MEDICINE

## 2022-08-16 PROCEDURE — 1159F PR MEDICATION LIST DOCUMENTED IN MEDICAL RECORD: ICD-10-PCS | Mod: CPTII,S$GLB,, | Performed by: FAMILY MEDICINE

## 2022-08-16 PROCEDURE — 99999 PR PBB SHADOW E&M-EST. PATIENT-LVL V: CPT | Mod: PBBFAC,,, | Performed by: FAMILY MEDICINE

## 2022-08-16 PROCEDURE — 99215 PR OFFICE/OUTPT VISIT, EST, LEVL V, 40-54 MIN: ICD-10-PCS | Mod: 25,S$GLB,, | Performed by: FAMILY MEDICINE

## 2022-08-16 PROCEDURE — 3051F PR MOST RECENT HEMOGLOBIN A1C LEVEL 7.0 - < 8.0%: ICD-10-PCS | Mod: CPTII,S$GLB,, | Performed by: FAMILY MEDICINE

## 2022-08-16 RX ORDER — LIDOCAINE HYDROCHLORIDE 10 MG/ML
1 INJECTION, SOLUTION EPIDURAL; INFILTRATION; INTRACAUDAL; PERINEURAL ONCE
Status: CANCELLED | OUTPATIENT
Start: 2022-08-16 | End: 2022-08-16

## 2022-08-16 RX ORDER — SODIUM CHLORIDE, SODIUM LACTATE, POTASSIUM CHLORIDE, CALCIUM CHLORIDE 600; 310; 30; 20 MG/100ML; MG/100ML; MG/100ML; MG/100ML
INJECTION, SOLUTION INTRAVENOUS CONTINUOUS
Status: CANCELLED | OUTPATIENT
Start: 2022-08-16

## 2022-08-16 NOTE — DISCHARGE INSTRUCTIONS
Your surgery is scheduled for 8/22/22.    Please [facility] to [facility] on the [facility]st Floor at 0530 a.m.    THIS TIME IS SUBJECT TO CHANGE.  YOU WILL RECEIVE A PHONE CALL THE DAY BEFORE SURGERY BY 3:30 PM TO CONFIRM YOUR TIME OF ARRIVAL.  IF YOU HAVE NOT RECEIVED A PHONE CALL BY 3:30 PM THE DAY BEFORE YOUR SURGERY PLEASE CALL [phone]     INSTRUCTIONS IMPORTANT!!!  ¨ Do not eat or drink after 12 midnight-including water, candy, gum, & mints. OK to brush teeth.      ____  Proceed to [facility] on *** for additional testing.        ____  Do not wear makeup, including mascara.  ____  No powder, lotions or creams to surgical area.  ____  Please remove all jewelry, including piercings and leave at home.  ____  No money or valuables needed. Please leave at home.  ____  Please bring any documents given by your doctor.  ____  If going home the same day, arrange for a ride home. You will not be able to             drive if Anesthesia was used.  ____  Children under 18 years require a parent / guardian present the entire time             they are in surgery / recovery.  ____  Wear loose fitting clothing. Allow for dressings, bandages.  ____  Stop Aspirin, Ibuprofen, Motrin, Aleve, Goody's/BC powders, Excedrine and Naproxen (NSAIDS) at least 3-5 days before surgery, unless otherwise instructed by your doctor, or the nurse.   You MAY use Tylenol/acetaminophen until day of surgery.  ____  Wash the surgical area with Hibiclens the night before surgery, and again the             morning of surgery.  Be sure to rinse hibiclens off completely (if instructed by   nurse).  ____  If you take diabetic medication, do not take am of surgery unless instructed by Doctor.  ____  Call MD for temperature above 101 degrees or any other signs of infection such as Urinary (bladder) infection, Upper respiratory infection, skin boils, etc.  ____ Stop taking any Fish Oil supplement or any Vitamins that contain Vitamin E at  least 5 days prior to surgery.  ____ Do Not wear your contact lenses the day of your procedure.  You may wear your glasses.      ____Do not shave surgical site for 3 days prior to surgery.  ____ Practice Good hand washing before, during, and after procedure.      I have read or had read and explained to me, and understand the above information.  Additional comments or instructions:  For additional questions call [phone]      ANESTHESIA SIDE EFFECTS  -For the first 24 hours after surgery:  Do not drive, use heavy equipment, make important decisions, or drink alcohol  -It is normal to feel sleepy for several hours.  Rest until you are more awake.  -Have someone stay with you, if needed.  They can watch for problems and help keep you safe.  -Some possible post anesthesia side effects include: nausea and vomiting, sore throat and hoarseness, sleepiness, and dizziness.        Pre-Op Bathing Instructions    Before surgery, you can play an important role in your own health.    Because skin is not sterile, we need to be sure that your skin is as free of germs as possible. By following the instructions below, you can reduce the number of germs on your skin before surgery.    IMPORTANT: You will need to shower with a special soap called Hibiclens*, available at any pharmacy.  If you are allergic to Chlorhexidine (the antiseptic in Hibiclens), use an antibacterial soap such as Dial Soap for your preoperative shower.  You will shower with Hibiclens both the night before your surgery and the morning of your surgery.  Do not use Hibiclens on the head, face or genitals to avoid injury to those areas.    STEP #1: THE NIGHT BEFORE YOUR SURGERY     Do not shave the area of your body where your surgery will be performed.  Shower and wash your hair and body as usual with your normal soap and shampoo.  Rinse your hair and body thoroughly after you shower to remove all soap residue.  With your hand, apply one packet of Hibiclens soap to  the surgical site.   Wash the site gently for five (5) minutes. Do not scrub your skin too hard.   Do not wash with your regular soap after Hibiclens is used.  Rinse your body thoroughly.  Pat yourself dry with a clean, soft towel.  Do not use lotion, cream, or powder.  Wear clean clothes.    STEP #2: THE MORNING OF YOUR SURGERY     Repeat Step #1.    * Not to be used by people allergic to Chlorhexidine.

## 2022-08-16 NOTE — ANESTHESIA PREPROCEDURE EVALUATION
08/16/2022  Vick Gonzalez is a 66 y.o., male scheduled for ACDF 4-7 8/22/22.    Family med clearance pending lab results    Past Medical History:   Diagnosis Date    Anemia     Anxiety     Chronic pain syndrome 7/13/2011    CKD (chronic kidney disease), stage III     Diabetes mellitus type II, uncontrolled     Discitis of lumbosacral region 1/16/2015    ED (erectile dysfunction)     Encounter for blood transfusion     Genital herpes     Gout, arthritis     History of alcohol abuse     History of hepatitis C, s/p successful Rx w/ SVR24 (cure) - 5/2018 8/23/2011    Completed 24wks Epclusa + RBV w/SVR12 - 2/2018  -     History of positive PPD, treatment status unknown     Pulmonary granulomas, negative sputum cultures for AFB and indeterminate quantferon test    History of substance abuse     Hypertension     Hypothyroidism     Liver replaced by transplant 8/23/2011    DATE: 12/16/2013  LIVER BIOPSY : REASON:  hep C staging  PATHOLOGY COMMITTEE NOTE/PLAN: :grade  1 / stage 1        Pancreatitis 2016    Peptic ulcer disease      Past Surgical History:   Procedure Laterality Date    CARPAL TUNNEL RELEASE Left 10/29/2021    Procedure: RELEASE, CARPAL TUNNEL;  Surgeon: Jameson Alejo Jr., MD;  Location: [facility] OR;  Service: Orthopedics;  Laterality: Left;    CHOLECYSTECTOMY      INJECTION OF JOINT Right 12/2/2019    Procedure: INJECTION, JOINT SI;  Surgeon: Thu Penn MD;  Location: [facility];  Service: Pain Management;  Laterality: Right;  RT SI JNT INJ    LIVER TRANSPLANT  06/2010    SPINE SURGERY         Pre-op Assessment    I have reviewed the Patient Summary Reports.     I have reviewed the Nursing Notes.    I have reviewed the Medications.     Review of Systems  Anesthesia Hx:  No problems with previous Anesthesia Denies Hx of Anesthetic complications  History of prior surgery  of interest to airway management or planning: liver transplant.  Denies Personal Hx of Anesthesia complications.   Social:  Non-Smoker, No Alcohol Use    Cardiovascular:   Exercise tolerance: good Denies Pacemaker. Hypertension, well controlled   Denies Angina. hyperlipidemia  Peripheral Arterial Disease    Pulmonary:  Pulmonary Normal  Denies Shortness of breath.    Renal/:   Chronic Renal Disease (CKDIII)    Hepatic/GI:   PUD, Liver Disease, (s/p liver transplant) Hepatitis, C    Musculoskeletal:  Spine Disorders: lumbar and cervical    Neurological:   Denies TIA. Denies CVA. Neuromuscular Disease,  Denies Seizures.    Endocrine:   Diabetes, well controlled, type 2 Hypothyroidism  Obesity / BMI > 30  Psych:   Psychiatric History anxiety          Physical Exam  General: Well nourished and Cooperative    Airway:  Mallampati: II   Mouth Opening: Normal  TM Distance: Normal  Tongue: Normal  Neck ROM: Normal ROM    Dental:  Intact    Chest/Lungs:  Clear to auscultation, Normal Respiratory Rate    Heart:  Rate: Normal  Rhythm: Regular Rhythm  Sounds: Normal    EKG 8/16/22 with Dr. Lopez  EKG done in the office today, personally reviewed showing:  Sinus rhythm, 1 PAC noted.  Normal axis and intervals otherwise.  No acute ST or T-wave changes concerning for ischemia.      Anesthesia Plan  Type of Anesthesia, risks & benefits discussed:    Anesthesia Type: Gen ETT  Intra-op Monitoring Plan: Standard ASA Monitors  Post Op Pain Control Plan: multimodal analgesia  Induction:  IV  ASA Score: 3  Anesthesia Plan Notes: Anesthesia consent to be signed prior to surgery 8/22/22    Ready For Surgery From Anesthesia Perspective.     .

## 2022-08-16 NOTE — PRE-PROCEDURE INSTRUCTIONS
Admit    Allergies, medical, surgical, family and psychosocial histories reviewed with patient. Periop plan of care reviewed. Preop instructions given, including medications to take and to hold. Hibiclens soap and instructions on use given. Time allotted for questions to be addressed.  Patient verbalized understanding.

## 2022-08-16 NOTE — PROGRESS NOTES
(Portions of this note were dictated using voice recognition software and may contain dictation related errors in spelling/grammar/syntax not found on text review)    CC:   Chief Complaint   Patient presents with    Pre-op Exam     Neck surgery 8/22       HPI: 66 y.o. male     Preop for neck surgery  Surgeon: Dr. Titi Christian  Date 8/22    Cervical radiculopathy/myelopathy indication with left hand pain and weakness, planning anterior cervical diskectomy and fusion C4 through C7    Last visit here was January 2022     Medical history as below  Diabetes with peripheral neuropathy:  Complicated by  dietary noncompliance now followed by endocrinology (last seen January 2022), was on Trulicity but not currently taking.  His current regimen of diabetes was adjusted to glargine 40 units, NovoLog to 15 units t.i.d. with meals.  Was believe that a lot of his hyperglycemia issues were relating to dietary and insulin noncompliance.  B G doing better at home, fasting usually low 100 range to 110s     Hypertension on lisinopril 40 mg daily, metoprolol 200 mg daily, nifedipine 60 mg daily.  BP stable     Dyslipidemia on Crestor 5mg daily.     Hypothyroidism:  Synthroid 88mcg.       Anxiety on Zoloft 100 mg daily.       Liver Transplant secondary to end-stage liver disease hepatitis C and prior alcohol abuse.:  Followed by hepatology.  On Prograf.   fibroscan showed stage II fibrosis     Chronic pain, followed by pain management.  Was On oxycodone along with his gabapentin.  However failed drug test with positive cocaine and Soma.  History of cocaine abuse,   Currently off of opiates for pain control, pain management had recommended continuing with gabapentin and interventional management if desired.      Denies any chest pain or shortness of breath      Past Medical History:   Diagnosis Date    Anemia     Anxiety     Chronic pain syndrome 7/13/2011    CKD (chronic kidney disease), stage III     Diabetes mellitus type II,  uncontrolled     Discitis of lumbosacral region 1/16/2015    ED (erectile dysfunction)     Encounter for blood transfusion     Genital herpes     Gout, arthritis     History of alcohol abuse     History of hepatitis C, s/p successful Rx w/ SVR24 (cure) - 5/2018 8/23/2011    Completed 24wks Epclusa + RBV w/SVR12 - 2/2018  -     History of positive PPD, treatment status unknown     Pulmonary granulomas, negative sputum cultures for AFB and indeterminate quantferon test    History of substance abuse     Hypertension     Hypothyroidism     Liver replaced by transplant 8/23/2011    DATE: 12/16/2013  LIVER BIOPSY : REASON:  hep C staging  PATHOLOGY COMMITTEE NOTE/PLAN: :grade  1 / stage 1        Pancreatitis 2016    Peptic ulcer disease        Past Surgical History:   Procedure Laterality Date    CARPAL TUNNEL RELEASE Left 10/29/2021    Procedure: RELEASE, CARPAL TUNNEL;  Surgeon: Jameson Alejo Jr., MD;  Location: [facility];  Service: Orthopedics;  Laterality: Left;    CHOLECYSTECTOMY      INJECTION OF JOINT Right 12/2/2019    Procedure: INJECTION, JOINT SI;  Surgeon: Thu Penn MD;  Location: [facility] PAIN MGT;  Service: Pain Management;  Laterality: Right;  RT SI JNT INJ    LIVER TRANSPLANT  06/2010    SPINE SURGERY         Family History   Problem Relation Age of Onset    Cancer Mother     Diabetes Mother     Heart disease Mother     Diabetes Sister     Cancer Maternal Uncle 82        colon CA    Drug abuse Daughter     Melanoma Neg Hx     Psoriasis Neg Hx     Lupus Neg Hx     Eczema Neg Hx     Acne Neg Hx        Social History     Tobacco Use    Smoking status: Former Smoker    Smokeless tobacco: Never Used   Substance Use Topics    Alcohol use: No     Comment: over 5 years ago, none currently    Drug use: Not Currently     Comment: Former cocaine use       Lab Results   Component Value Date    WBC 7.93 07/25/2022    HGB 12.3 (L) 07/25/2022    HCT 37.8 (L) 07/25/2022    MCV 91  "07/25/2022     (L) 07/25/2022    CHOL 113 (L) 01/25/2022    TRIG 153 (H) 01/25/2022    HDL 30 (L) 01/25/2022    ALT 15 07/25/2022    AST 15 07/25/2022    BILITOT 0.5 07/25/2022    ALKPHOS 66 07/25/2022     07/25/2022    K 4.3 07/25/2022     07/25/2022    CREATININE 1.4 07/25/2022    ESTGFRAFRICA >60.0 07/25/2022    EGFRNONAA 52.7 (A) 07/25/2022    CALCIUM 8.8 07/25/2022    ALBUMIN 3.7 07/25/2022    BUN 15 07/25/2022    CO2 23 07/25/2022    TSH 2.026 01/25/2022    PSA 0.22 01/19/2022    PSADIAG 0.28 10/09/2017    INR 1.0 07/12/2022    HGBA1C 7.4 (H) 01/25/2022    MICALBCREAT 37.9 (H) 01/24/2022    LDLCALC 52.4 (L) 01/25/2022     (H) 07/25/2022    OFOIYWZC21HG 30 10/11/2021             Vital signs reviewed  Vitals:    08/16/22 1051   BP: 124/72   BP Location: Right arm   Patient Position: Sitting   BP Method: Medium (Manual)   Pulse: 81   Temp: 97.7 °F (36.5 °C)   TempSrc: Oral   SpO2: 97%   Weight: 121.1 kg (266 lb 15.6 oz)   Height: 6' 3" (1.905 m)       Wt Readings from Last 4 Encounters:   08/16/22 121.1 kg (266 lb 15.6 oz)   07/26/22 117 kg (257 lb 15 oz)   07/12/22 117 kg (257 lb 15 oz)   07/06/22 117.1 kg (258 lb 2.5 oz)       PE:   APPEARANCE: Well nourished, well developed, in no acute distress.    HEAD: Normocephalic, atraumatic.  EYES: PERRL. EOMI.   Conjunctivae noninjected.  EARS: TM's intact. Light reflex normal. No retraction or perforation  NOSE: Mucosa pink. Airway clear.  MOUTH & THROAT: No tonsillar enlargement. No pharyngeal erythema or exudate.   NECK: Supple with no cervical lymphadenopathy.    CHEST: Good inspiratory effort. Lungs clear to auscultation with no wheezes or crackles.  CARDIOVASCULAR: Normal S1, S2. No rubs, murmurs, or gallops.  ABDOMEN: Bowel sounds normal. Not distended. Soft. No tenderness or masses. No organomegaly.  EXTREMITIES: No edema, cyanosis, or clubbing.      IMPRESSION  1. Cervical myelopathy    2. Uncontrolled type 2 diabetes mellitus with " hyperglycemia    3. Diabetic polyneuropathy associated with type 2 diabetes mellitus    4. Aortic atherosclerosis    5. Hyperlipidemia associated with type 2 diabetes mellitus    6. Immunosuppression    7. Hypertension, unspecified type    8. Thrombocytopenia    9. S/P liver transplant    10. Preop examination            PLAN  Orders Placed This Encounter   Procedures    CBC Auto Differential    Comprehensive Metabolic Panel    Microalbumin/Creatinine Ratio, Urine    Hemoglobin A1C    EKG 12-lead     EKG done in the office today, personally reviewed showing:  Sinus rhythm, 1 PAC noted.  Normal axis and intervals otherwise.  No acute ST or T-wave changes concerning for ischemia.    Reviewed neuro surgery documentation.  No current medication changes per Transplant Clinic.  Reviewed his most recent labs as above and will be ordering updated testing.    Blood pressure stable    Diabetes improving per patient with medication compliance.  Given upcoming surgery, will update A1c.  As long as stable/improving, should be medically stable for upcoming surgery with risk factors given his diabetes liver transplant history, if persists for immunosuppressed status.  He has stopped all aspirin products and will remain off of aspirin until after surgery    Will notify his surgeon after above testing

## 2022-08-18 ENCOUNTER — TELEPHONE (OUTPATIENT)
Dept: FAMILY MEDICINE | Facility: CLINIC | Age: 67
End: 2022-08-18
Payer: MEDICARE

## 2022-08-18 NOTE — TELEPHONE ENCOUNTER
"Spoke to patient to give him the A1c results. I asked that he check his sugars 2 hours after meals, which he said he isn't doing. He said that he doesn't eat "like normal people" and I explained that it matters less when he is eating, but that when he does eat he checks it 2 hours later and keep a log. I explained that Dr Lopez wants to see if there is a trend of spikes after eating because he may need to adjust his insulin at mealtimes. He said "or maybe it's the damn energy drinks I drink." I asked if they were sugar free drinks and he said he doesn't think so, but will change to sugar free. I also told him that although the A1c is elevated, Dr Lopez has given clearance for surgery with some precautions. Patient verbalized understanding and said that he will keep a log of sugars after eating.  "

## 2022-08-18 NOTE — TELEPHONE ENCOUNTER
Please let patient know that his labs are back, A1c was elevated at 9.7.  He was checking fasting blood sugars which looks to be fairly well controlled but I do not think he has been checking 2 hour after meals sugars recently.  I am concerned that perhaps elevated A1c is related to after meal sugar elevations.  I would recommend that he start assessing sugars 2 hours after meals, and if these readings are 180 or higher he may need to increase his mealtime insulin.  I believe he is followed by endocrinology but has not seen in January, not sure if he has a follow-up scheduled with them    regarding surgery, I have conferred with his neurosurgeon.  While it would be more ideal to have his diabetes better controlled prior to surgery, given the need to have this surgery fairly quickly given his spine issues, I had recommended that he can proceed with surgery at a higher risk and have his glucose closely monitored in hospital and supplemented with additional insulin if necessary .  Aside from glycemic control, his other conditions are relatively stable for upcoming surgery

## 2022-08-18 NOTE — TELEPHONE ENCOUNTER
----- Message from Titi Christian MD sent at 8/18/2022 12:56 PM CDT -----  Regarding: RE: upcoming surgery for cervical myelopathy  Thank you.  I will proceed as you have directed. \  \  ----- Message -----  From: Emanuel Lopez MD  Sent: 8/18/2022   8:21 AM CDT  To: Titi Christian MD  Subject: upcoming surgery for cervical myelopathy         Hi Dr. Christian    I saw Mr. Gonzalez earlier for preoperative medical evaluation prior to his upcoming surgery for cervical myelopathy.  Overall he has been doing well clinically from medical standpoint, and he has been more compliant with his diabetes medication any was in the past.  He has no concerning cardiovascular symptoms.   The only issue was that his A1c had increased to 9.7 from prior 7.4.  His fasting morning sugars are all very well controlled so I suspect the issue is postprandial hyperglycemia which he has not really been checking recently.  Was followed by endocrinology but has not seen them since January.   I wanted to get your thoughts on his upcoming surgery and if you have any specific cut offs for glycemic control.  If this was a quite minor elective surgery I would probably tell him to  hold off until we further assess his postprandial blood sugar control and get better under control although given his cervical myelopathy and his neuropathic symptoms I was concerned about the urgency of getting his surgery sooner rather than later, and if that is the case I think it is reasonable to proceed but just at a higher risk and with close glycemic assessment in the hospital and supplementary insulin as necessary.      Thanks,    Emanuel Lopez

## 2022-08-19 ENCOUNTER — TELEPHONE (OUTPATIENT)
Dept: NEUROSURGERY | Facility: CLINIC | Age: 67
End: 2022-08-19
Payer: MEDICARE

## 2022-08-19 DIAGNOSIS — Z98.1 S/P CERVICAL SPINAL FUSION: Primary | ICD-10-CM

## 2022-08-22 ENCOUNTER — ANESTHESIA (OUTPATIENT)
Dept: SURGERY | Facility: HOSPITAL | Age: 67
End: 2022-08-22
Payer: MEDICARE

## 2022-08-22 ENCOUNTER — HOSPITAL ENCOUNTER (OUTPATIENT)
Facility: HOSPITAL | Age: 67
Discharge: HOME HEALTH | End: 2022-08-26
Attending: NEUROLOGICAL SURGERY | Admitting: NEUROLOGICAL SURGERY
Payer: MEDICARE

## 2022-08-22 DIAGNOSIS — G54.9 MYELORADICULOPATHY: Primary | ICD-10-CM

## 2022-08-22 LAB
ANION GAP SERPL CALC-SCNC: 13 MMOL/L (ref 8–16)
BASOPHILS # BLD AUTO: 0.01 K/UL (ref 0–0.2)
BASOPHILS NFR BLD: 0.1 % (ref 0–1.9)
BUN SERPL-MCNC: 14 MG/DL (ref 8–23)
CALCIUM SERPL-MCNC: 9.4 MG/DL (ref 8.7–10.5)
CHLORIDE SERPL-SCNC: 105 MMOL/L (ref 95–110)
CO2 SERPL-SCNC: 20 MMOL/L (ref 23–29)
CREAT SERPL-MCNC: 1.1 MG/DL (ref 0.5–1.4)
DIFFERENTIAL METHOD: ABNORMAL
EOSINOPHIL # BLD AUTO: 0.1 K/UL (ref 0–0.5)
EOSINOPHIL NFR BLD: 0.7 % (ref 0–8)
ERYTHROCYTE [DISTWIDTH] IN BLOOD BY AUTOMATED COUNT: 13.5 % (ref 11.5–14.5)
EST. GFR  (NO RACE VARIABLE): >60 ML/MIN/1.73 M^2
GLUCOSE SERPL-MCNC: 238 MG/DL (ref 70–110)
HCT VFR BLD AUTO: 37.9 % (ref 40–54)
HGB BLD-MCNC: 12.7 G/DL (ref 14–18)
IMM GRANULOCYTES # BLD AUTO: 0.04 K/UL (ref 0–0.04)
IMM GRANULOCYTES NFR BLD AUTO: 0.5 % (ref 0–0.5)
LYMPHOCYTES # BLD AUTO: 1.5 K/UL (ref 1–4.8)
LYMPHOCYTES NFR BLD: 17.4 % (ref 18–48)
MCH RBC QN AUTO: 30 PG (ref 27–31)
MCHC RBC AUTO-ENTMCNC: 33.5 G/DL (ref 32–36)
MCV RBC AUTO: 90 FL (ref 82–98)
MONOCYTES # BLD AUTO: 0.3 K/UL (ref 0.3–1)
MONOCYTES NFR BLD: 3 % (ref 4–15)
NEUTROPHILS # BLD AUTO: 6.6 K/UL (ref 1.8–7.7)
NEUTROPHILS NFR BLD: 78.3 % (ref 38–73)
NRBC BLD-RTO: 0 /100 WBC
PLATELET # BLD AUTO: 131 K/UL (ref 150–450)
PLATELET BLD QL SMEAR: ABNORMAL
PMV BLD AUTO: 14.2 FL (ref 9.2–12.9)
POCT GLUCOSE: 239 MG/DL (ref 70–110)
POCT GLUCOSE: 276 MG/DL (ref 70–110)
POCT GLUCOSE: 66 MG/DL (ref 70–110)
POTASSIUM SERPL-SCNC: 4.3 MMOL/L (ref 3.5–5.1)
RBC # BLD AUTO: 4.23 M/UL (ref 4.6–6.2)
SODIUM SERPL-SCNC: 138 MMOL/L (ref 136–145)
WBC # BLD AUTO: 8.45 K/UL (ref 3.9–12.7)

## 2022-08-22 PROCEDURE — 36415 COLL VENOUS BLD VENIPUNCTURE: CPT | Performed by: PHYSICIAN ASSISTANT

## 2022-08-22 PROCEDURE — 22853 PR INSERT BIOMECH DEV W/INTERBODY ARTHRODESIS, EA CONTIGUOUS DEFECT: ICD-10-PCS | Mod: ,,, | Performed by: NEUROLOGICAL SURGERY

## 2022-08-22 PROCEDURE — 22551 PR ARTHRODESIS ANT INTERBODY INC DISCECTOMY, CERVICAL BELOW C2: ICD-10-PCS | Mod: ,,, | Performed by: NEUROLOGICAL SURGERY

## 2022-08-22 PROCEDURE — 99900035 HC TECH TIME PER 15 MIN (STAT)

## 2022-08-22 PROCEDURE — 22551 PR ARTHRODESIS ANT INTERBODY INC DISCECTOMY, CERVICAL BELOW C2: ICD-10-PCS | Mod: AS,,, | Performed by: PHYSICIAN ASSISTANT

## 2022-08-22 PROCEDURE — 25000003 PHARM REV CODE 250: Performed by: NEUROLOGICAL SURGERY

## 2022-08-22 PROCEDURE — 20930 SP BONE ALGRFT MORSEL ADD-ON: CPT | Mod: ,,, | Performed by: NEUROLOGICAL SURGERY

## 2022-08-22 PROCEDURE — 63600175 PHARM REV CODE 636 W HCPCS: Performed by: PHYSICIAN ASSISTANT

## 2022-08-22 PROCEDURE — 22552 ARTHRD ANT NTRBD CERVICAL EA: CPT | Mod: AS,,, | Performed by: PHYSICIAN ASSISTANT

## 2022-08-22 PROCEDURE — 71000033 HC RECOVERY, INTIAL HOUR: Performed by: NEUROLOGICAL SURGERY

## 2022-08-22 PROCEDURE — 85025 COMPLETE CBC W/AUTO DIFF WBC: CPT | Performed by: PHYSICIAN ASSISTANT

## 2022-08-22 PROCEDURE — C1729 CATH, DRAINAGE: HCPCS | Performed by: NEUROLOGICAL SURGERY

## 2022-08-22 PROCEDURE — 22846 INSERT SPINE FIXATION DEVICE: CPT | Mod: AS,59,, | Performed by: PHYSICIAN ASSISTANT

## 2022-08-22 PROCEDURE — 25000242 PHARM REV CODE 250 ALT 637 W/ HCPCS: Performed by: PHYSICIAN ASSISTANT

## 2022-08-22 PROCEDURE — 37000009 HC ANESTHESIA EA ADD 15 MINS: Performed by: NEUROLOGICAL SURGERY

## 2022-08-22 PROCEDURE — C1769 GUIDE WIRE: HCPCS | Performed by: NEUROLOGICAL SURGERY

## 2022-08-22 PROCEDURE — 22552 PR ARTHRODESIS ANT INTERBODY INC DISCECTOMY, CERVICAL BELOW C2 EACH ADDL: ICD-10-PCS | Mod: AS,,, | Performed by: PHYSICIAN ASSISTANT

## 2022-08-22 PROCEDURE — 63600175 PHARM REV CODE 636 W HCPCS: Performed by: NURSE ANESTHETIST, CERTIFIED REGISTERED

## 2022-08-22 PROCEDURE — 80048 BASIC METABOLIC PNL TOTAL CA: CPT | Performed by: PHYSICIAN ASSISTANT

## 2022-08-22 PROCEDURE — 25000003 PHARM REV CODE 250: Performed by: NURSE ANESTHETIST, CERTIFIED REGISTERED

## 2022-08-22 PROCEDURE — 63600175 PHARM REV CODE 636 W HCPCS: Performed by: ANESTHESIOLOGY

## 2022-08-22 PROCEDURE — 63600175 PHARM REV CODE 636 W HCPCS: Performed by: NURSE PRACTITIONER

## 2022-08-22 PROCEDURE — 22853 INSJ BIOMECHANICAL DEVICE: CPT | Mod: AS,,, | Performed by: PHYSICIAN ASSISTANT

## 2022-08-22 PROCEDURE — 94761 N-INVAS EAR/PLS OXIMETRY MLT: CPT

## 2022-08-22 PROCEDURE — 22853 INSJ BIOMECHANICAL DEVICE: CPT | Mod: ,,, | Performed by: NEUROLOGICAL SURGERY

## 2022-08-22 PROCEDURE — 37000008 HC ANESTHESIA 1ST 15 MINUTES: Performed by: NEUROLOGICAL SURGERY

## 2022-08-22 PROCEDURE — 27201423 OPTIME MED/SURG SUP & DEVICES STERILE SUPPLY: Performed by: NEUROLOGICAL SURGERY

## 2022-08-22 PROCEDURE — 22846 INSERT SPINE FIXATION DEVICE: CPT | Mod: 59,,, | Performed by: NEUROLOGICAL SURGERY

## 2022-08-22 PROCEDURE — 22551 ARTHRD ANT NTRBDY CERVICAL: CPT | Mod: ,,, | Performed by: NEUROLOGICAL SURGERY

## 2022-08-22 PROCEDURE — 22552 ARTHRD ANT NTRBD CERVICAL EA: CPT | Mod: ,,, | Performed by: NEUROLOGICAL SURGERY

## 2022-08-22 PROCEDURE — 71000039 HC RECOVERY, EACH ADD'L HOUR: Performed by: NEUROLOGICAL SURGERY

## 2022-08-22 PROCEDURE — 22846 PR ANTERIOR INSTRUMENTATION 4-7 VERTEBRAL SEGMENTS: ICD-10-PCS | Mod: AS,59,, | Performed by: PHYSICIAN ASSISTANT

## 2022-08-22 PROCEDURE — 63600175 PHARM REV CODE 636 W HCPCS: Performed by: NEUROLOGICAL SURGERY

## 2022-08-22 PROCEDURE — 20930 PR ALLOGRAFT FOR SPINE SURGERY ONLY MORSELIZED: ICD-10-PCS | Mod: ,,, | Performed by: NEUROLOGICAL SURGERY

## 2022-08-22 PROCEDURE — 22552 PR ARTHRODESIS ANT INTERBODY INC DISCECTOMY, CERVICAL BELOW C2 EACH ADDL: ICD-10-PCS | Mod: ,,, | Performed by: NEUROLOGICAL SURGERY

## 2022-08-22 PROCEDURE — 22551 ARTHRD ANT NTRBDY CERVICAL: CPT | Mod: AS,,, | Performed by: PHYSICIAN ASSISTANT

## 2022-08-22 PROCEDURE — 94640 AIRWAY INHALATION TREATMENT: CPT | Mod: XB

## 2022-08-22 PROCEDURE — 25000003 PHARM REV CODE 250: Performed by: PHYSICIAN ASSISTANT

## 2022-08-22 PROCEDURE — 22846 PR ANTERIOR INSTRUMENTATION 4-7 VERTEBRAL SEGMENTS: ICD-10-PCS | Mod: 59,,, | Performed by: NEUROLOGICAL SURGERY

## 2022-08-22 PROCEDURE — 22853 PR INSERT BIOMECH DEV W/INTERBODY ARTHRODESIS, EA CONTIGUOUS DEFECT: ICD-10-PCS | Mod: AS,,, | Performed by: PHYSICIAN ASSISTANT

## 2022-08-22 PROCEDURE — 36000711: Performed by: NEUROLOGICAL SURGERY

## 2022-08-22 PROCEDURE — C1713 ANCHOR/SCREW BN/BN,TIS/BN: HCPCS | Performed by: NEUROLOGICAL SURGERY

## 2022-08-22 PROCEDURE — 36000710: Performed by: NEUROLOGICAL SURGERY

## 2022-08-22 PROCEDURE — 27800903 OPTIME MED/SURG SUP & DEVICES OTHER IMPLANTS: Performed by: NEUROLOGICAL SURGERY

## 2022-08-22 DEVICE — SCREW BONE ANT SKYLINE 18MM TI: Type: IMPLANTABLE DEVICE | Site: NOSE | Status: FUNCTIONAL

## 2022-08-22 RX ORDER — BISACODYL 10 MG
10 SUPPOSITORY, RECTAL RECTAL DAILY
Status: DISCONTINUED | OUTPATIENT
Start: 2022-08-23 | End: 2022-08-26 | Stop reason: HOSPADM

## 2022-08-22 RX ORDER — GABAPENTIN 400 MG/1
800 CAPSULE ORAL 3 TIMES DAILY
Status: DISCONTINUED | OUTPATIENT
Start: 2022-08-22 | End: 2022-08-26 | Stop reason: HOSPADM

## 2022-08-22 RX ORDER — ACETAMINOPHEN 500 MG
1000 TABLET ORAL EVERY 8 HOURS
Status: DISCONTINUED | OUTPATIENT
Start: 2022-08-22 | End: 2022-08-26 | Stop reason: HOSPADM

## 2022-08-22 RX ORDER — ONDANSETRON 8 MG/1
8 TABLET, ORALLY DISINTEGRATING ORAL EVERY 6 HOURS PRN
Status: DISCONTINUED | OUTPATIENT
Start: 2022-08-22 | End: 2022-08-26 | Stop reason: HOSPADM

## 2022-08-22 RX ORDER — OXYCODONE HYDROCHLORIDE 5 MG/1
5 TABLET ORAL EVERY 4 HOURS PRN
Status: DISCONTINUED | OUTPATIENT
Start: 2022-08-22 | End: 2022-08-26 | Stop reason: HOSPADM

## 2022-08-22 RX ORDER — SERTRALINE HYDROCHLORIDE 100 MG/1
100 TABLET, FILM COATED ORAL DAILY
Status: DISCONTINUED | OUTPATIENT
Start: 2022-08-22 | End: 2022-08-26 | Stop reason: HOSPADM

## 2022-08-22 RX ORDER — LIDOCAINE HYDROCHLORIDE 10 MG/ML
1 INJECTION, SOLUTION EPIDURAL; INFILTRATION; INTRACAUDAL; PERINEURAL ONCE
Status: DISCONTINUED | OUTPATIENT
Start: 2022-08-22 | End: 2022-08-22 | Stop reason: HOSPADM

## 2022-08-22 RX ORDER — SUCCINYLCHOLINE CHLORIDE 20 MG/ML
INJECTION INTRAMUSCULAR; INTRAVENOUS
Status: DISCONTINUED | OUTPATIENT
Start: 2022-08-22 | End: 2022-08-22

## 2022-08-22 RX ORDER — BUPIVACAINE HCL/EPINEPHRINE 0.5-1:200K
VIAL (ML) INJECTION
Status: DISCONTINUED | OUTPATIENT
Start: 2022-08-22 | End: 2022-08-22 | Stop reason: HOSPADM

## 2022-08-22 RX ORDER — TACROLIMUS 1 MG/1
1 CAPSULE ORAL EVERY EVENING
Status: DISCONTINUED | OUTPATIENT
Start: 2022-08-22 | End: 2022-08-26 | Stop reason: HOSPADM

## 2022-08-22 RX ORDER — PROCHLORPERAZINE EDISYLATE 5 MG/ML
5 INJECTION INTRAMUSCULAR; INTRAVENOUS EVERY 6 HOURS PRN
Status: DISCONTINUED | OUTPATIENT
Start: 2022-08-22 | End: 2022-08-26 | Stop reason: HOSPADM

## 2022-08-22 RX ORDER — CEFAZOLIN SODIUM 2 G/50ML
2 SOLUTION INTRAVENOUS
Status: COMPLETED | OUTPATIENT
Start: 2022-08-22 | End: 2022-08-23

## 2022-08-22 RX ORDER — VANCOMYCIN HYDROCHLORIDE 1 G/20ML
INJECTION, POWDER, LYOPHILIZED, FOR SOLUTION INTRAVENOUS
Status: DISCONTINUED | OUTPATIENT
Start: 2022-08-22 | End: 2022-08-22 | Stop reason: HOSPADM

## 2022-08-22 RX ORDER — IBUPROFEN 200 MG
24 TABLET ORAL
Status: DISCONTINUED | OUTPATIENT
Start: 2022-08-22 | End: 2022-08-26 | Stop reason: HOSPADM

## 2022-08-22 RX ORDER — FENTANYL CITRATE 50 UG/ML
INJECTION, SOLUTION INTRAMUSCULAR; INTRAVENOUS
Status: DISCONTINUED | OUTPATIENT
Start: 2022-08-22 | End: 2022-08-22

## 2022-08-22 RX ORDER — METOPROLOL TARTRATE 1 MG/ML
INJECTION, SOLUTION INTRAVENOUS
Status: DISCONTINUED | OUTPATIENT
Start: 2022-08-22 | End: 2022-08-22

## 2022-08-22 RX ORDER — IBUPROFEN 200 MG
16 TABLET ORAL
Status: DISCONTINUED | OUTPATIENT
Start: 2022-08-22 | End: 2022-08-26 | Stop reason: HOSPADM

## 2022-08-22 RX ORDER — PROPOFOL 10 MG/ML
VIAL (ML) INTRAVENOUS CONTINUOUS PRN
Status: DISCONTINUED | OUTPATIENT
Start: 2022-08-22 | End: 2022-08-22

## 2022-08-22 RX ORDER — AMOXICILLIN 250 MG
1 CAPSULE ORAL DAILY PRN
Status: DISCONTINUED | OUTPATIENT
Start: 2022-08-22 | End: 2022-08-26 | Stop reason: HOSPADM

## 2022-08-22 RX ORDER — HYDRALAZINE HYDROCHLORIDE 20 MG/ML
10 INJECTION INTRAMUSCULAR; INTRAVENOUS EVERY 10 MIN PRN
Status: COMPLETED | OUTPATIENT
Start: 2022-08-22 | End: 2022-08-22

## 2022-08-22 RX ORDER — INSULIN ASPART 100 [IU]/ML
1-10 INJECTION, SOLUTION INTRAVENOUS; SUBCUTANEOUS
Status: DISCONTINUED | OUTPATIENT
Start: 2022-08-22 | End: 2022-08-26 | Stop reason: HOSPADM

## 2022-08-22 RX ORDER — LACTULOSE 10 G/15ML
15 SOLUTION ORAL DAILY
Status: DISCONTINUED | OUTPATIENT
Start: 2022-08-23 | End: 2022-08-26 | Stop reason: HOSPADM

## 2022-08-22 RX ORDER — PROPOFOL 10 MG/ML
VIAL (ML) INTRAVENOUS
Status: DISCONTINUED | OUTPATIENT
Start: 2022-08-22 | End: 2022-08-22

## 2022-08-22 RX ORDER — LISINOPRIL 20 MG/1
40 TABLET ORAL DAILY
Status: DISCONTINUED | OUTPATIENT
Start: 2022-08-22 | End: 2022-08-26 | Stop reason: HOSPADM

## 2022-08-22 RX ORDER — ROCURONIUM BROMIDE 10 MG/ML
INJECTION, SOLUTION INTRAVENOUS
Status: DISCONTINUED | OUTPATIENT
Start: 2022-08-22 | End: 2022-08-22

## 2022-08-22 RX ORDER — LABETALOL HYDROCHLORIDE 5 MG/ML
10 INJECTION, SOLUTION INTRAVENOUS EVERY 6 HOURS PRN
Status: DISCONTINUED | OUTPATIENT
Start: 2022-08-22 | End: 2022-08-26 | Stop reason: HOSPADM

## 2022-08-22 RX ORDER — NIFEDIPINE 30 MG/1
60 TABLET, EXTENDED RELEASE ORAL DAILY
Refills: 11 | Status: DISCONTINUED | OUTPATIENT
Start: 2022-08-22 | End: 2022-08-24

## 2022-08-22 RX ORDER — MIDAZOLAM HYDROCHLORIDE 1 MG/ML
INJECTION, SOLUTION INTRAMUSCULAR; INTRAVENOUS
Status: DISCONTINUED | OUTPATIENT
Start: 2022-08-22 | End: 2022-08-22

## 2022-08-22 RX ORDER — CEFAZOLIN SODIUM 2 G/50ML
2 SOLUTION INTRAVENOUS
Status: COMPLETED | OUTPATIENT
Start: 2022-08-22 | End: 2022-08-22

## 2022-08-22 RX ORDER — DEXAMETHASONE SODIUM PHOSPHATE 4 MG/ML
INJECTION, SOLUTION INTRA-ARTICULAR; INTRALESIONAL; INTRAMUSCULAR; INTRAVENOUS; SOFT TISSUE
Status: DISCONTINUED | OUTPATIENT
Start: 2022-08-22 | End: 2022-08-22

## 2022-08-22 RX ORDER — KETAMINE HCL IN 0.9 % NACL 50 MG/5 ML
SYRINGE (ML) INTRAVENOUS
Status: DISCONTINUED | OUTPATIENT
Start: 2022-08-22 | End: 2022-08-22

## 2022-08-22 RX ORDER — IPRATROPIUM BROMIDE AND ALBUTEROL SULFATE 2.5; .5 MG/3ML; MG/3ML
3 SOLUTION RESPIRATORY (INHALATION)
Status: DISCONTINUED | OUTPATIENT
Start: 2022-08-22 | End: 2022-08-26 | Stop reason: HOSPADM

## 2022-08-22 RX ORDER — MUPIROCIN 20 MG/G
1 OINTMENT TOPICAL 2 TIMES DAILY
Status: DISCONTINUED | OUTPATIENT
Start: 2022-08-22 | End: 2022-08-22 | Stop reason: HOSPADM

## 2022-08-22 RX ORDER — METHOCARBAMOL 750 MG/1
750 TABLET, FILM COATED ORAL 3 TIMES DAILY
Status: DISCONTINUED | OUTPATIENT
Start: 2022-08-22 | End: 2022-08-26 | Stop reason: HOSPADM

## 2022-08-22 RX ORDER — OXYCODONE HYDROCHLORIDE 5 MG/1
10 TABLET ORAL EVERY 4 HOURS PRN
Status: DISCONTINUED | OUTPATIENT
Start: 2022-08-22 | End: 2022-08-26 | Stop reason: HOSPADM

## 2022-08-22 RX ORDER — HYDRALAZINE HYDROCHLORIDE 20 MG/ML
10 INJECTION INTRAMUSCULAR; INTRAVENOUS EVERY 6 HOURS PRN
Status: DISCONTINUED | OUTPATIENT
Start: 2022-08-22 | End: 2022-08-26 | Stop reason: HOSPADM

## 2022-08-22 RX ORDER — TACROLIMUS 1 MG/1
2 CAPSULE ORAL EVERY MORNING
Status: DISCONTINUED | OUTPATIENT
Start: 2022-08-23 | End: 2022-08-26 | Stop reason: HOSPADM

## 2022-08-22 RX ORDER — SODIUM CHLORIDE 0.9 % (FLUSH) 0.9 %
10 SYRINGE (ML) INJECTION
Status: DISCONTINUED | OUTPATIENT
Start: 2022-08-22 | End: 2022-08-26 | Stop reason: HOSPADM

## 2022-08-22 RX ORDER — SODIUM CHLORIDE, SODIUM LACTATE, POTASSIUM CHLORIDE, CALCIUM CHLORIDE 600; 310; 30; 20 MG/100ML; MG/100ML; MG/100ML; MG/100ML
INJECTION, SOLUTION INTRAVENOUS CONTINUOUS
Status: DISCONTINUED | OUTPATIENT
Start: 2022-08-22 | End: 2022-08-23

## 2022-08-22 RX ORDER — DEXMEDETOMIDINE HYDROCHLORIDE 100 UG/ML
INJECTION, SOLUTION INTRAVENOUS
Status: DISCONTINUED | OUTPATIENT
Start: 2022-08-22 | End: 2022-08-22

## 2022-08-22 RX ORDER — POLYETHYLENE GLYCOL 3350 17 G/17G
17 POWDER, FOR SOLUTION ORAL DAILY
Status: DISCONTINUED | OUTPATIENT
Start: 2022-08-23 | End: 2022-08-22

## 2022-08-22 RX ORDER — LEVOTHYROXINE SODIUM 88 UG/1
88 TABLET ORAL
Status: DISCONTINUED | OUTPATIENT
Start: 2022-08-22 | End: 2022-08-26 | Stop reason: HOSPADM

## 2022-08-22 RX ORDER — ONDANSETRON 2 MG/ML
4 INJECTION INTRAMUSCULAR; INTRAVENOUS ONCE AS NEEDED
Status: DISCONTINUED | OUTPATIENT
Start: 2022-08-22 | End: 2022-08-22 | Stop reason: HOSPADM

## 2022-08-22 RX ORDER — ALLOPURINOL 100 MG/1
100 TABLET ORAL 2 TIMES DAILY
Status: DISCONTINUED | OUTPATIENT
Start: 2022-08-22 | End: 2022-08-26 | Stop reason: HOSPADM

## 2022-08-22 RX ORDER — HYDROMORPHONE HYDROCHLORIDE 2 MG/ML
2 INJECTION, SOLUTION INTRAMUSCULAR; INTRAVENOUS; SUBCUTANEOUS
Status: DISCONTINUED | OUTPATIENT
Start: 2022-08-22 | End: 2022-08-26 | Stop reason: HOSPADM

## 2022-08-22 RX ORDER — MUPIROCIN 20 MG/G
OINTMENT TOPICAL
Status: DISCONTINUED | OUTPATIENT
Start: 2022-08-22 | End: 2022-08-22 | Stop reason: HOSPADM

## 2022-08-22 RX ORDER — HYDRALAZINE HYDROCHLORIDE 20 MG/ML
INJECTION INTRAMUSCULAR; INTRAVENOUS
Status: DISCONTINUED | OUTPATIENT
Start: 2022-08-22 | End: 2022-08-22

## 2022-08-22 RX ORDER — AMOXICILLIN 250 MG
2 CAPSULE ORAL NIGHTLY PRN
Status: DISCONTINUED | OUTPATIENT
Start: 2022-08-22 | End: 2022-08-26 | Stop reason: HOSPADM

## 2022-08-22 RX ORDER — LIDOCAINE HYDROCHLORIDE 20 MG/ML
INJECTION, SOLUTION EPIDURAL; INFILTRATION; INTRACAUDAL; PERINEURAL
Status: DISCONTINUED | OUTPATIENT
Start: 2022-08-22 | End: 2022-08-22

## 2022-08-22 RX ORDER — ATORVASTATIN CALCIUM 20 MG/1
20 TABLET, FILM COATED ORAL DAILY
Refills: 11 | Status: DISCONTINUED | OUTPATIENT
Start: 2022-08-22 | End: 2022-08-26 | Stop reason: HOSPADM

## 2022-08-22 RX ORDER — METOPROLOL SUCCINATE 50 MG/1
200 TABLET, EXTENDED RELEASE ORAL DAILY
Status: DISCONTINUED | OUTPATIENT
Start: 2022-08-22 | End: 2022-08-26 | Stop reason: HOSPADM

## 2022-08-22 RX ORDER — ONDANSETRON 2 MG/ML
INJECTION INTRAMUSCULAR; INTRAVENOUS
Status: DISCONTINUED | OUTPATIENT
Start: 2022-08-22 | End: 2022-08-22

## 2022-08-22 RX ORDER — HYDROMORPHONE HYDROCHLORIDE 2 MG/ML
0.5 INJECTION, SOLUTION INTRAMUSCULAR; INTRAVENOUS; SUBCUTANEOUS EVERY 5 MIN PRN
Status: COMPLETED | OUTPATIENT
Start: 2022-08-22 | End: 2022-08-22

## 2022-08-22 RX ORDER — GLUCAGON 1 MG
1 KIT INJECTION
Status: DISCONTINUED | OUTPATIENT
Start: 2022-08-22 | End: 2022-08-26 | Stop reason: HOSPADM

## 2022-08-22 RX ORDER — MAG HYDROX/ALUMINUM HYD/SIMETH 200-200-20
30 SUSPENSION, ORAL (FINAL DOSE FORM) ORAL EVERY 4 HOURS PRN
Status: DISCONTINUED | OUTPATIENT
Start: 2022-08-22 | End: 2022-08-26 | Stop reason: HOSPADM

## 2022-08-22 RX ORDER — HEPARIN SODIUM 5000 [USP'U]/ML
5000 INJECTION, SOLUTION INTRAVENOUS; SUBCUTANEOUS EVERY 8 HOURS
Status: DISCONTINUED | OUTPATIENT
Start: 2022-08-23 | End: 2022-08-26 | Stop reason: HOSPADM

## 2022-08-22 RX ORDER — MUPIROCIN 20 MG/G
OINTMENT TOPICAL 2 TIMES DAILY
Status: DISCONTINUED | OUTPATIENT
Start: 2022-08-22 | End: 2022-08-26 | Stop reason: HOSPADM

## 2022-08-22 RX ORDER — TRAZODONE HYDROCHLORIDE 50 MG/1
50 TABLET ORAL NIGHTLY
Status: DISCONTINUED | OUTPATIENT
Start: 2022-08-22 | End: 2022-08-26 | Stop reason: HOSPADM

## 2022-08-22 RX ADMIN — HYDROMORPHONE HYDROCHLORIDE 0.5 MG: 2 INJECTION, SOLUTION INTRAMUSCULAR; INTRAVENOUS; SUBCUTANEOUS at 01:08

## 2022-08-22 RX ADMIN — HYDROMORPHONE HYDROCHLORIDE 0.5 MG: 2 INJECTION, SOLUTION INTRAMUSCULAR; INTRAVENOUS; SUBCUTANEOUS at 11:08

## 2022-08-22 RX ADMIN — DEXMEDETOMIDINE HYDROCHLORIDE 8 MCG: 100 INJECTION, SOLUTION, CONCENTRATE INTRAVENOUS at 07:08

## 2022-08-22 RX ADMIN — CEFAZOLIN SODIUM 2 G: 2 SOLUTION INTRAVENOUS at 04:08

## 2022-08-22 RX ADMIN — LIDOCAINE HYDROCHLORIDE 25 MG: 20 INJECTION, SOLUTION EPIDURAL; INFILTRATION; INTRACAUDAL; PERINEURAL at 07:08

## 2022-08-22 RX ADMIN — FENTANYL CITRATE 100 MCG: 50 INJECTION, SOLUTION INTRAMUSCULAR; INTRAVENOUS at 07:08

## 2022-08-22 RX ADMIN — HYDRALAZINE HYDROCHLORIDE 5 MG: 20 INJECTION, SOLUTION INTRAMUSCULAR; INTRAVENOUS at 12:08

## 2022-08-22 RX ADMIN — TACROLIMUS 1 MG: 1 CAPSULE ORAL at 06:08

## 2022-08-22 RX ADMIN — MUPIROCIN: 20 OINTMENT TOPICAL at 06:08

## 2022-08-22 RX ADMIN — FENTANYL CITRATE 50 MCG: 50 INJECTION, SOLUTION INTRAMUSCULAR; INTRAVENOUS at 10:08

## 2022-08-22 RX ADMIN — PROCHLORPERAZINE EDISYLATE 5 MG: 5 INJECTION INTRAMUSCULAR; INTRAVENOUS at 03:08

## 2022-08-22 RX ADMIN — TRAZODONE HYDROCHLORIDE 50 MG: 50 TABLET ORAL at 08:08

## 2022-08-22 RX ADMIN — ROCURONIUM BROMIDE 5 MG: 10 INJECTION, SOLUTION INTRAVENOUS at 07:08

## 2022-08-22 RX ADMIN — HYDRALAZINE HYDROCHLORIDE 10 MG: 20 INJECTION, SOLUTION INTRAMUSCULAR; INTRAVENOUS at 02:08

## 2022-08-22 RX ADMIN — SUCCINYLCHOLINE CHLORIDE 140 MG: 20 INJECTION, SOLUTION INTRAMUSCULAR; INTRAVENOUS at 07:08

## 2022-08-22 RX ADMIN — HYDROMORPHONE HYDROCHLORIDE 2 MG: 2 INJECTION, SOLUTION INTRAMUSCULAR; INTRAVENOUS; SUBCUTANEOUS at 07:08

## 2022-08-22 RX ADMIN — PROPOFOL 50 MG: 10 INJECTION, EMULSION INTRAVENOUS at 09:08

## 2022-08-22 RX ADMIN — METOPROLOL TARTRATE 2 MG: 1 INJECTION, SOLUTION INTRAVENOUS at 10:08

## 2022-08-22 RX ADMIN — FENTANYL CITRATE 50 MCG: 50 INJECTION, SOLUTION INTRAMUSCULAR; INTRAVENOUS at 09:08

## 2022-08-22 RX ADMIN — Medication 20 MG: at 07:08

## 2022-08-22 RX ADMIN — OXYCODONE HYDROCHLORIDE 10 MG: 5 TABLET ORAL at 11:08

## 2022-08-22 RX ADMIN — METHOCARBAMOL TABLETS 750 MG: 750 TABLET, COATED ORAL at 08:08

## 2022-08-22 RX ADMIN — CEFAZOLIN SODIUM 2 G: 2 SOLUTION INTRAVENOUS at 08:08

## 2022-08-22 RX ADMIN — FENTANYL CITRATE 50 MCG: 50 INJECTION, SOLUTION INTRAMUSCULAR; INTRAVENOUS at 11:08

## 2022-08-22 RX ADMIN — DEXMEDETOMIDINE HYDROCHLORIDE 8 MCG: 100 INJECTION, SOLUTION, CONCENTRATE INTRAVENOUS at 09:08

## 2022-08-22 RX ADMIN — DEXAMETHASONE SODIUM PHOSPHATE 8 MG: 4 INJECTION, SOLUTION INTRA-ARTICULAR; INTRALESIONAL; INTRAMUSCULAR; INTRAVENOUS; SOFT TISSUE at 08:08

## 2022-08-22 RX ADMIN — PROPOFOL 150 MG: 10 INJECTION, EMULSION INTRAVENOUS at 07:08

## 2022-08-22 RX ADMIN — LIDOCAINE HYDROCHLORIDE 75 MG: 20 INJECTION, SOLUTION EPIDURAL; INFILTRATION; INTRACAUDAL; PERINEURAL at 07:08

## 2022-08-22 RX ADMIN — OXYCODONE HYDROCHLORIDE 10 MG: 5 TABLET ORAL at 01:08

## 2022-08-22 RX ADMIN — LABETALOL HYDROCHLORIDE 10 MG: 5 INJECTION INTRAVENOUS at 06:08

## 2022-08-22 RX ADMIN — HYDRALAZINE HYDROCHLORIDE 10 MG: 20 INJECTION, SOLUTION INTRAMUSCULAR; INTRAVENOUS at 08:08

## 2022-08-22 RX ADMIN — ONDANSETRON 8 MG: 2 INJECTION, SOLUTION INTRAMUSCULAR; INTRAVENOUS at 11:08

## 2022-08-22 RX ADMIN — PROPOFOL 50 MG: 10 INJECTION, EMULSION INTRAVENOUS at 07:08

## 2022-08-22 RX ADMIN — INSULIN ASPART 6 UNITS: 100 INJECTION, SOLUTION INTRAVENOUS; SUBCUTANEOUS at 06:08

## 2022-08-22 RX ADMIN — OXYCODONE HYDROCHLORIDE 10 MG: 5 TABLET ORAL at 06:08

## 2022-08-22 RX ADMIN — HYDROMORPHONE HYDROCHLORIDE 2 MG: 2 INJECTION, SOLUTION INTRAMUSCULAR; INTRAVENOUS; SUBCUTANEOUS at 04:08

## 2022-08-22 RX ADMIN — GABAPENTIN 800 MG: 400 CAPSULE ORAL at 08:08

## 2022-08-22 RX ADMIN — FENTANYL CITRATE 50 MCG: 50 INJECTION, SOLUTION INTRAMUSCULAR; INTRAVENOUS at 07:08

## 2022-08-22 RX ADMIN — GLYCOPYRROLATE 0.2 MG: 0.2 INJECTION, SOLUTION INTRAMUSCULAR; INTRAVITREAL at 07:08

## 2022-08-22 RX ADMIN — FENTANYL CITRATE 50 MCG: 50 INJECTION, SOLUTION INTRAMUSCULAR; INTRAVENOUS at 08:08

## 2022-08-22 RX ADMIN — METHOCARBAMOL TABLETS 750 MG: 750 TABLET, COATED ORAL at 02:08

## 2022-08-22 RX ADMIN — IPRATROPIUM BROMIDE AND ALBUTEROL SULFATE 3 ML: 2.5; .5 SOLUTION RESPIRATORY (INHALATION) at 07:08

## 2022-08-22 RX ADMIN — HYDROMORPHONE HYDROCHLORIDE 0.5 MG: 2 INJECTION, SOLUTION INTRAMUSCULAR; INTRAVENOUS; SUBCUTANEOUS at 03:08

## 2022-08-22 RX ADMIN — DEXMEDETOMIDINE HYDROCHLORIDE 12 MCG: 100 INJECTION, SOLUTION, CONCENTRATE INTRAVENOUS at 07:08

## 2022-08-22 RX ADMIN — SODIUM CHLORIDE: 0.9 INJECTION, SOLUTION INTRAVENOUS at 07:08

## 2022-08-22 RX ADMIN — PROPOFOL 50 MG: 10 INJECTION, EMULSION INTRAVENOUS at 08:08

## 2022-08-22 RX ADMIN — PROPOFOL 200 MCG/KG/MIN: 10 INJECTION, EMULSION INTRAVENOUS at 07:08

## 2022-08-22 RX ADMIN — HYDROMORPHONE HYDROCHLORIDE 0.5 MG: 2 INJECTION, SOLUTION INTRAMUSCULAR; INTRAVENOUS; SUBCUTANEOUS at 12:08

## 2022-08-22 RX ADMIN — ATORVASTATIN CALCIUM 20 MG: 20 TABLET, FILM COATED ORAL at 03:08

## 2022-08-22 RX ADMIN — MIDAZOLAM 2 MG: 1 INJECTION INTRAMUSCULAR; INTRAVENOUS at 07:08

## 2022-08-22 RX ADMIN — GABAPENTIN 800 MG: 400 CAPSULE ORAL at 03:08

## 2022-08-22 RX ADMIN — PHENYLEPHRINE HYDROCHLORIDE 0.1 MCG/KG/MIN: 10 INJECTION INTRAVENOUS at 08:08

## 2022-08-22 RX ADMIN — HYDRALAZINE HYDROCHLORIDE 10 MG: 20 INJECTION, SOLUTION INTRAMUSCULAR; INTRAVENOUS at 03:08

## 2022-08-22 RX ADMIN — IPRATROPIUM BROMIDE AND ALBUTEROL SULFATE 3 ML: 2.5; .5 SOLUTION RESPIRATORY (INHALATION) at 03:08

## 2022-08-22 RX ADMIN — SODIUM CHLORIDE, SODIUM LACTATE, POTASSIUM CHLORIDE, AND CALCIUM CHLORIDE: .6; .31; .03; .02 INJECTION, SOLUTION INTRAVENOUS at 06:08

## 2022-08-22 RX ADMIN — MUPIROCIN: 20 OINTMENT TOPICAL at 08:08

## 2022-08-22 RX ADMIN — Medication 30 MG: at 07:08

## 2022-08-22 RX ADMIN — ALLOPURINOL 100 MG: 100 TABLET ORAL at 08:08

## 2022-08-22 NOTE — HPI
66 y.o. male with a past medical history significant for DM II, HTN, HLD, liver transplant (2/2 end-stage liver disease hepatitis C and prior alcohol abuse), who saw NSGY for C4-C7 ACDF for management of myeloradiculopathy. Patient presented with signs and symptoms of left hand pain and myelopathy. MRI revealed severe canal stenosis C4-C7. Patient was admitted by NSGY for surgical intervention. [facility] has been consulted for medical management.    4

## 2022-08-22 NOTE — SUBJECTIVE & OBJECTIVE
Past Medical History:   Diagnosis Date    Anemia     Anxiety     Chronic pain syndrome 7/13/2011    CKD (chronic kidney disease), stage III     Diabetes mellitus type II, uncontrolled     Discitis of lumbosacral region 1/16/2015    ED (erectile dysfunction)     Encounter for blood transfusion     Genital herpes     Gout, arthritis     History of alcohol abuse     History of hepatitis C, s/p successful Rx w/ SVR24 (cure) - 5/2018 8/23/2011    Completed 24wks Epclusa + RBV w/SVR12 - 2/2018  -     History of positive PPD, treatment status unknown     Pulmonary granulomas, negative sputum cultures for AFB and indeterminate quantferon test    History of substance abuse     Hypertension     Hypothyroidism     Liver replaced by transplant 8/23/2011    DATE: 12/16/2013  LIVER BIOPSY : REASON:  hep C staging  PATHOLOGY COMMITTEE NOTE/PLAN: :grade  1 / stage 1        Pancreatitis 2016    Peptic ulcer disease        Past Surgical History:   Procedure Laterality Date    CARPAL TUNNEL RELEASE Left 10/29/2021    Procedure: RELEASE, CARPAL TUNNEL;  Surgeon: Jameson Alejo Jr., MD;  Location: [facility] OR;  Service: Orthopedics;  Laterality: Left;    CHOLECYSTECTOMY      INJECTION OF JOINT Right 12/2/2019    Procedure: INJECTION, JOINT SI;  Surgeon: Thu Penn MD;  Location: [facility] PAIN MGT;  Service: Pain Management;  Laterality: Right;  RT SI JNT INJ    LIVER TRANSPLANT  06/2010    SPINE SURGERY         Review of patient's allergies indicates:  No Known Allergies    No current facility-administered medications on file prior to encounter.     Current Outpatient Medications on File Prior to Encounter   Medication Sig    gabapentin (NEURONTIN) 800 MG tablet TAKE 1 TABLET BY MOUTH THREE TIMES A DAY    insulin glargine U-300 conc (TOUJEO MAX U-300 SOLOSTAR) 300 unit/mL (3 mL) insulin pen Inject 40 Units into the skin once daily.    insulin lispro 100 unit/mL pen Inject 20 Units into the skin 3 (three) times daily with meals.     "levothyroxine (SYNTHROID) 88 MCG tablet TAKE 1 TABLET BY MOUTH EVERY DAY (Patient taking differently: Take 88 mcg by mouth before breakfast.)    lisinopriL (PRINIVIL,ZESTRIL) 40 MG tablet TAKE 1 TABLET BY MOUTH EVERY DAY    metoprolol succinate (TOPROL-XL) 200 MG 24 hr tablet Take 1 tablet (200 mg total) by mouth once daily.    NIFEdipine (ADALAT CC) 60 MG TbSR TAKE 1 TABLET BY MOUTH EVERY DAY    sertraline (ZOLOFT) 100 MG tablet TAKE 1 TABLET (100 MG TOTAL) BY MOUTH ONCE DAILY.    tacrolimus (PROGRAF) 1 MG Cap TAKE 2 CAPSULES (2 MG TOTAL) BY MOUTH IN THE MORNING & TAKE 1 CAPSULE (1 MG TOTAL) IN THE EVENING.    albuterol (VENTOLIN HFA) 90 mcg/actuation inhaler Inhale 2 puffs into the lungs every 6 (six) hours as needed for Wheezing or Shortness of Breath. Rescue    allopurinoL (ZYLOPRIM) 100 MG tablet TAKE 1 TABLET BY MOUTH TWICE A DAY (Patient taking differently: Take 100 mg by mouth 2 (two) times a day.)    aluminum-magnesium hydroxide-simethicone (MAALOX) 200-200-20 mg/5 mL Susp Take 30 mLs by mouth 4 (four) times daily before meals and nightly.    aspirin 81 MG Chew Take 1 tablet (81 mg total) by mouth once daily. (Patient taking differently: Take 81 mg by mouth once daily. Resume after surgery)    BD ULTRA-FINE MENDY PEN NEEDLES 32 gauge x 5/32" Ndle     blood sugar diagnostic (TRUE METRIX GLUCOSE TEST STRIP) Strp USE 3 TIMES DAILY TO TEST BLOOD GLUCOSE LEVEL    calcium carbonate-vitamin D3 (CALCIUM 600 WITH VITAMIN D3) 600 mg(1,500mg) -400 unit Chew Take 1 tablet by mouth once daily.     flash glucose sensor (FREESTYLE PALLAVI 2 SENSOR) Kit One sensor every 14 days    gabapentin (NEURONTIN) 300 MG capsule Take 1 capsule (300 mg total) by mouth every evening. (Patient not taking: Reported on 8/16/2022)    glucose 4 GM chewable tablet Take 4 tablets (16 g total) by mouth as needed for Low blood sugar.    glucose 4 GM chewable tablet Take 4 tablets (16 g total) by mouth as needed for Low blood sugar (If having " "symptoms of blurry vision, palpitations, confusion, shakiness.  Please check sugars and if sugar below 70 please take 4 tablets and re-check sugar everry 15 minutes until sugars are above 70 and symptoms resolve.).    HYDROcodone-acetaminophen (NORCO) 5-325 mg per tablet 22 tablets.    lancets 30 gauge Misc 1 lancet by Misc.(Non-Drug; Combo Route) route 4 (four) times daily before meals and nightly.    multivitamin (THERAGRAN) per tablet Take 1 tablet by mouth once daily.     NOVOFINE PLUS 32 gauge x 1/6" Ndle     ondansetron (ZOFRAN-ODT) 4 MG TbDL Take 1 tablet (4 mg total) by mouth every 6 (six) hours as needed (Nausea).    pen needle, diabetic (BD ULTRA-FINE MENDY PEN NEEDLE) 32 gauge x 5/32" Ndle TEST WITH NOVOLOG THREE TIMES A DAY WITH MEALS AND WITH LEVEMIR AT BEDTIME    rosuvastatin (CRESTOR) 5 MG tablet TAKE 1 TABLET BY MOUTH EVERY DAY    traZODone (DESYREL) 50 MG tablet TAKE 1 TABLET (50 MG TOTAL) BY MOUTH EVERY EVENING.    TRUE METRIX GLUCOSE METER Misc TEST 4 (FOUR) TIMES DAILY BEFORE MEALS AND NIGHTLY.    [DISCONTINUED] insulin aspart U-100 (NOVOLOG FLEXPEN U-100 INSULIN) 100 unit/mL (3 mL) InPn pen Inject 20 Units into the skin 3 (three) times daily with meals.     Family History       Problem Relation (Age of Onset)    Cancer Mother, Maternal Uncle (82)    Diabetes Mother, Sister    Drug abuse Daughter    Heart disease Mother          Tobacco Use    Smoking status: Former Smoker    Smokeless tobacco: Never Used   Substance and Sexual Activity    Alcohol use: No     Comment: over 5 years ago, none currently    Drug use: Not Currently     Comment: Former cocaine use    Sexual activity: Yes       Review of Systems:  GEN: Negative unless otherwise stated in history of present illness  HEENT: Negative unless otherwise stated in the history of present illness  NECK: Negative unless otherwise stated in history of present illness  RESPIRATORY: Negative unless otherwise stated in history of present " illness  CARDIOVASCULAR: Negative unless otherwise stated in history of present illness  GI: Negative unless otherwise stated in history of present illness  : Negative unless otherwise stated in history of present illness  EXTR: Negative unless otherwise stated in history of present illness  SKIN: Negative unless otherwise stated in history of present illness  MUSCULOSKELETAL: Negative unless otherwise stated in history of present illness  NEURO: Negative unless otherwise stated in history of present illness  PSYCH: Negative unless otherwise stated in history of present illness  Except as documented, all other systems reviewed and negative      Objective:     Vital Signs (Most Recent):  Temp: 98.8 °F (37.1 °C) (08/22/22 1618)  Pulse: 101 (08/22/22 1618)  Resp: 18 (08/22/22 1651)  BP: (!) 184/110 (08/22/22 1618)  SpO2: 98 % (08/22/22 1618)   Vital Signs (24h Range):  Temp:  [96.8 °F (36 °C)-98.8 °F (37.1 °C)] 98.8 °F (37.1 °C)  Pulse:  [] 101  Resp:  [10-32] 18  SpO2:  [96 %-100 %] 98 %  BP: (153-219)/() 184/110     Weight: 108.9 kg (240 lb)  Body mass index is 30.81 kg/m².    Physical Exam:  GEN:  No acute distress, lying in bed   HEENT:  Normocephalic, atraumatic, extra ocular movements intact drain place  NECK:  Supple, good range of motion  RESPIRATORY:  Clear to auscultation ant, symmetrical chest rise  CARDIOVASCULAR:  Tachycardic, +s1/2  GI:  Soft, nontender, bowel sounds decrased  : No flank tenderness, normal genitalia  EXT: No edema, pulses palpated  SKIN: Warm, dry, no rashes  MUSCULOSKELETAL: hand- strength R>L no apparent atrophy  NEURO:  Alert, oriented, answering questions appropriately  PSYCH:  Mood & affect appropriate, pleasant    Significant Labs:    A1C: No results for input(s): HGBA1C in the last 24 hours.  Blood Culture: No results for input(s): LABBLOO in the last 24 hours.  BMP/CMP:   Recent Labs   Lab 08/22/22  1329      K 4.3      CO2 20*   *   BUN 14    CREATININE 1.1   CALCIUM 9.4   ANIONGAP 13     CBC:   Recent Labs   Lab 08/22/22  1329   WBC 8.45   HGB 12.7*   HCT 37.9*   *     Cardiac Markers: No results for input(s): CKMB, MYOGLOBIN, BNP, TROPISTAT in the last 24 hours.  Coagulation: No results for input(s): PT, INR, APTT in the last 24 hours.  Lactic Acid: No results for input(s): LACTATE in the last 24 hours.  Lipid Panel: No results for input(s): CHOL, HDL, LDLCALC, TRIG, CHOLHDL in the last 24 hours.  Magnesium: No results for input(s): MG in the last 24 hours.  POCT Glucose:   Recent Labs   Lab 08/22/22  0642   POCTGLUCOSE 66*     Troponin: No results for input(s): TROPONINI in the last 24 hours.  TSH: No results for input(s): TSH in the last 24 hours.  Urine Culture: No results for input(s): LABURIN in the last 24 hours.  Urine Studies: No results for input(s): COLORU, APPEARANCEUA, PHUR, SPECGRAV, PROTEINUA, GLUCUA, KETONESU, BILIRUBINUA, OCCULTUA, NITRITE, UROBILINOGEN, LEUKOCYTESUR, RBCUA, WBCUA, BACTERIA, SQUAMEPITHEL, HYALINECASTS, WRIGHTSTUR in the last 24 hours.    Significant Imaging:     SURG FL Surgery Fluoro Usage  See OP Notes for results.     IMPRESSION: See OP Notes for results.     This procedure was auto-finalized by: Virtual Radiologist

## 2022-08-22 NOTE — PLAN OF CARE
Certification of Assistant at Surgery       Surgery Date: 8/22/2022     Participating Surgeons:  Surgeon(s) and Role:     * Titi Christian MD - Primary     * Yohana Love PA-C - Assisting    Procedures:  Procedure(s) (LRB):  Procedure: ACDF 4-7 LOS: 4.0 Anesthesia: General Blood: Type & Screen Radiology: C-Arm Microscope: ------- SNS: EMG, SEP, MEP Brace: [place] Bed: Regular Bed, Shoulder Strap Headrest:------ Position: Supine Equipment: Depuy (N/A)    Assistant Surgeon's Certification of Necessity:  I understand that section 1842 (b) (6) (d) of the Social Security Act generally prohibits Medicare Part B reasonable charge payment for the services of assistants at surgery in teaching hospitals when qualified residents are available to furnish such services. I certify that the services for which payment is claimed were medically necessary, and that no qualified resident was available to perform the services. I further understand that these services are subject to post-payment review by the Medicare carrier.      Yohana Love PA-C    08/22/2022  12:29 PM

## 2022-08-22 NOTE — INTERVAL H&P NOTE
The patient has been examined and the H&P has been reviewed:    I concur with the findings with the following changes since H&P written.      Surgery risks, benefits and alternative options discussed and understood by patient/family.      There are no hospital problems to display for this patient.    Vick Gonzalez is a 66 y.o. male with a past medical history significant for DM II (A1C 9.7), HTN, HLD, liver transplant (2/2 end-stage liver disease hepatitis C and prior alcohol abuse), hypothyroidism, and anxiety and who presents for C4-C7 ACDF for management of myeloradiculopathy. Patient presented to clinic with signs and symptoms of left hand pain and myelopathy. MRI revealing severe canal stenosis C4-C7.       Motor Strength: Moves all extremities spontaneously with good tone. No abnormal movements seen.     Strength  Deltoids Triceps Biceps Wrist Extension Wrist Flexion Hand    Upper: R 5/5 5/5 5/5 4+/5 4+/5 4+/5    L 5/5 5/5 5/5 4/5 4/5 4/5     Strength  Iliopsoas Quadriceps Knee  Flexion Tibialis  anterior Gastro- cnemius EHL   Lower: R 5/5 5/5 5/5 5/5 5/5 5/5    L 5/5 5/5 5/5 5/5 5/5 5/5   .      Sensory Exam   LT diminished left hand, all dermatomes      Gait, Coordination, and Reflexes      Reflexes   Right triceps: 1+  Left triceps: 1+  Right patellar: 3+  Left patellar: 3+  Right Castro: present  Left Castro: present  Right ankle clonus: absent  Left ankle clonus: absent

## 2022-08-22 NOTE — ANESTHESIA POSTPROCEDURE EVALUATION
Anesthesia Post Evaluation    Patient: Vick Gonzalez    Procedure(s) Performed: Procedure(s) (LRB):  Procedure: ACDF 4-7 LOS: 4.0 Anesthesia: General Blood: Type & Screen Radiology: C-Arm Microscope: ------- SNS: EMG, SEP, MEP Brace: [place] Bed: Regular Bed, Shoulder Strap Headrest:------ Position: Supine Equipment: ThirdPresence (N/A)    Final Anesthesia Type: general      Patient location during evaluation: PACU  Patient participation: Yes- Able to Participate  Level of consciousness: awake and alert  Post-procedure vital signs: reviewed and stable  Pain management: adequate  Airway patency: patent    PONV status at discharge: No PONV  Anesthetic complications: no      Cardiovascular status: blood pressure returned to baseline  Respiratory status: unassisted  Hydration status: euvolemic  Follow-up not needed.          Vitals Value Taken Time   /95 08/22/22 1548   Temp 36.6 °C (97.9 °F) 08/22/22 1530   Pulse 98 08/22/22 1551   Resp 16 08/22/22 1550   SpO2 97 % 08/22/22 1551   Vitals shown include unvalidated device data.      No case tracking events are documented in the log.      Pain/Reinier Score: Pain Rating Prior to Med Admin: 7 (8/22/2022  3:15 PM)  Reinier Score: 8 (8/22/2022 12:20 PM)

## 2022-08-22 NOTE — ASSESSMENT & PLAN NOTE
C4, C5, C6, C7 anterior cervical discectomy and fusion  Procedure: ACDF 4-7    See Op Note for complete details.   Further mgnt per NSGY

## 2022-08-22 NOTE — ANESTHESIA PROCEDURE NOTES
Intubation    Date/Time: 8/22/2022 7:30 AM  Performed by: Brianna Snow CRNA  Authorized by: Brianna Snow CRNA     Intubation:     Induction:  Intravenous    Intubated:  Postinduction    Mask Ventilation:  Easy mask    Attempts:  1    Attempted By:  CRNA    Method of Intubation:  Video laryngoscopy    Blade:  Nagel 4    Laryngeal View Grade: Grade IIb - only the arytenoids and epiglottis seen      Difficult Airway Encountered?: No      Complications:  None    Airway Device:  Oral endotracheal tube (with rigid stylet)    Airway Device Size:  7.5    Style/Cuff Inflation:  Cuffed    Inflation Amount (mL):  6    Tube secured:  22    Secured at:  The lips    Placement Verified By:  Capnometry    Complicating Factors:  Large/floppy epiglottis, poor neck/head extension, overbite and anterior larynx    Findings Post-Intubation:  BS equal bilateral  Notes:      Head and neck maintained in neutral alignment throughout intubation.

## 2022-08-22 NOTE — ASSESSMENT & PLAN NOTE
66 y.o. male pmhx DM II (A1C 9.7), HTN, HLD, liver transplant, hypothyroidism, s/p C4-C7 ACDF on 8/22 by Dr. Christian.     --Floor care   -q4h neuro checks  --All pertinent labs and diagnostics personally reviewed.  --Imaging: obtain post op cervical xrays  --Drains: 1 HV anterior neck drain to full suction. Record output q shift or when full  --Pain control: Scheduled Tylenol 1000mg q8h, oxy IR 5mg and Oxy IR 10 mg PRN q4h. Robaxin 750 TID.   --HTN: uncontrolled. Continue home regimen. Start PRN hydralazine/labetolol. SBP goal <170.   --Liver transplant: Continue home tacrolimus   --DVT ppx: TEDs/SCDs/SQH  --Activity: PT/OT, OOB. C-collar when up and out of bed. OK to remove while resting in bed.   --Diet: diabetic  --Bowel regimen: hx partial SBO 2020, resolved. Lactulose and senna daily  --Urinary: Remove tierney POD1. PRN bladder scans.   --DM: MDSSI  --Atelectasis ppx: Encourage IS hourly, CPT/Duonebs q4h awake  --HM consulted for assistance with medical management. Appreciate assistance.     Dispo: pending PT/OT recs    Discussed with Dr. Christian

## 2022-08-22 NOTE — ASSESSMENT & PLAN NOTE
Patient's FSGs are uncontrolled due to hyperglycemia on current medication regimen.  Last A1c reviewed-   Lab Results   Component Value Date    HGBA1C 9.7 (H) 08/16/2022     Most recent fingerstick glucose reviewed-   Recent Labs   Lab 08/22/22  0642   POCTGLUCOSE 66*     Current correctional scale  Medium  Maintain anti-hyperglycemic dose as follows-   Antihyperglycemics (From admission, onward)            Start     Stop Route Frequency Ordered    08/22/22 1340  insulin aspart U-100 pen 1-10 Units         -- SubQ Before meals & nightly PRN 08/22/22 1243        Hold Oral hypoglycemics while patient is in the hospital.  Consult DM educator

## 2022-08-22 NOTE — TRANSFER OF CARE
"Anesthesia Transfer of Care Note    Patient: Vick Gonzalez    Procedure(s) Performed: Procedure(s) (LRB):  Procedure: ACDF 4-7 LOS: 4.0 Anesthesia: General Blood: Type & Screen Radiology: C-Arm Microscope: ------- SNS: EMG, SEP, MEP Brace: [place] Bed: Regular Bed, Shoulder Strap Headrest:------ Position: Supine Equipment: ADVIZE (N/A)    Patient location: PACU    Anesthesia Type: general    Transport from OR: Transported from OR on 6-10 L/min O2 by face mask with adequate spontaneous ventilation    Post pain: adequate analgesia    Post assessment: no apparent anesthetic complications    Post vital signs: stable    Level of consciousness: awake, alert and oriented    Nausea/Vomiting: no nausea/vomiting    Complications: none    Transfer of care protocol was followed      Last vitals:   Visit Vitals  BP (!) 166/97 (BP Location: Left arm, Patient Position: Lying)   Pulse 78   Temp 36.7 °C (98.1 °F) (Skin)   Resp 18   Ht 6' 2" (1.88 m)   Wt 108.9 kg (240 lb)   SpO2 99%   BMI 30.81 kg/m²     "

## 2022-08-22 NOTE — HPI
Vick Gonzalez is a 66 y.o. male with a past medical history significant for DM II (A1C 9.7), HTN, HLD, liver transplant (2/2 end-stage liver disease hepatitis C and prior alcohol abuse), hypothyroidism, and anxiety and who presents for C4-C7 ACDF for management of myeloradiculopathy. Patient presented to clinic with signs and symptoms of left hand pain and myelopathy. MRI revealing severe canal stenosis C4-C7.

## 2022-08-22 NOTE — PLAN OF CARE
[facility] - Surgery (Hospital)  Brief Operative Note    SUMMARY     Surgery Date: 8/22/2022     Surgeon(s) and Role:     * Titi Christian MD - Primary     * Yohana Love PA-C - Assisting      Pre-op Diagnosis:  Cervical radiculopathy [M54.12]  Cervical spinal stenosis [M48.02]  Cervical myelopathy [G95.9]    Post-op Diagnosis:  Post-Op Diagnosis Codes:     * Cervical radiculopathy [M54.12]     * Cervical spinal stenosis [M48.02]     * Cervical myelopathy [G95.9]    Procedure(s) (LRB):  Procedure: ACDF 4-7 LOS: 4.0 Anesthesia: General Blood: Type & Screen Radiology: C-Arm Microscope: ------- SNS: EMG, SEP, MEP Brace: [place] Bed: Regular Bed, Shoulder Strap Headrest:------ Position: Supine Equipment: Amplitude (N/A)    Anesthesia: General    Operative Findings: C4, C5, C6, C7 anterior cervical discectomy and fusion. See Op Note for complete details.     Estimated Blood Loss: 100 cc    Estimated Blood Loss has been documented.         Specimens:   Specimen (24h ago, onward)            None          [id number]

## 2022-08-22 NOTE — PROGRESS NOTES
Pt arrived to unit. Introduced self as VN for this shift. Admission questions completed by VN. Educated pt on safety precautions, and VN's role in pt care. Opportunity given for pt's questions. All questions answered.      08/22/22 4759   Nurse Notification   Bedside Nurse Notified? Yes   Name of Bedside Nurse Brianna Ramirez RN   Nurse Notfication Method Secure Chat   Nurse Notified Of Medications;Patient Request   Admission   Initial VN Admission Questions Complete   Communication Issues? None   Shift   Virtual Nurse - Patient Verbalized Approval Of Camera Use   Type of Frequent Check   Type Other (see comments)  (Admission)   Safety/Activity   Patient Rounds bed in low position;placement of personal items at bedside;visualized patient;call light in patient/parent reach   Safety Promotion/Fall Prevention assistive device/personal item within reach;Fall Risk reviewed with patient/family;side rails raised x 2;instructed to call staff for mobility   Positioning   Body Position supine   Head of Bed (HOB) Positioning HOB at 20-30 degrees   Pain/Comfort/Sleep   Preferred Pain Scale number (Numeric Rating Pain Scale)   Pain Rating (0-10): Rest 10

## 2022-08-23 LAB
POCT GLUCOSE: 185 MG/DL (ref 70–110)
POCT GLUCOSE: 205 MG/DL (ref 70–110)
POCT GLUCOSE: 244 MG/DL (ref 70–110)

## 2022-08-23 PROCEDURE — 92610 EVALUATE SWALLOWING FUNCTION: CPT

## 2022-08-23 PROCEDURE — 63600175 PHARM REV CODE 636 W HCPCS: Performed by: PHYSICIAN ASSISTANT

## 2022-08-23 PROCEDURE — 97535 SELF CARE MNGMENT TRAINING: CPT

## 2022-08-23 PROCEDURE — 94640 AIRWAY INHALATION TREATMENT: CPT | Mod: XB

## 2022-08-23 PROCEDURE — 94761 N-INVAS EAR/PLS OXIMETRY MLT: CPT

## 2022-08-23 PROCEDURE — 97162 PT EVAL MOD COMPLEX 30 MIN: CPT

## 2022-08-23 PROCEDURE — 25000003 PHARM REV CODE 250: Performed by: PHYSICIAN ASSISTANT

## 2022-08-23 PROCEDURE — 94760 N-INVAS EAR/PLS OXIMETRY 1: CPT

## 2022-08-23 PROCEDURE — 97165 OT EVAL LOW COMPLEX 30 MIN: CPT

## 2022-08-23 PROCEDURE — 25000242 PHARM REV CODE 250 ALT 637 W/ HCPCS: Performed by: PHYSICIAN ASSISTANT

## 2022-08-23 PROCEDURE — 99900035 HC TECH TIME PER 15 MIN (STAT)

## 2022-08-23 PROCEDURE — 97116 GAIT TRAINING THERAPY: CPT

## 2022-08-23 RX ORDER — TALC
6 POWDER (GRAM) TOPICAL NIGHTLY PRN
Status: DISCONTINUED | OUTPATIENT
Start: 2022-08-23 | End: 2022-08-26 | Stop reason: HOSPADM

## 2022-08-23 RX ORDER — DEXAMETHASONE SODIUM PHOSPHATE 4 MG/ML
4 INJECTION, SOLUTION INTRA-ARTICULAR; INTRALESIONAL; INTRAMUSCULAR; INTRAVENOUS; SOFT TISSUE EVERY 6 HOURS
Status: COMPLETED | OUTPATIENT
Start: 2022-08-23 | End: 2022-08-25

## 2022-08-23 RX ADMIN — HEPARIN SODIUM 5000 UNITS: 5000 INJECTION INTRAVENOUS; SUBCUTANEOUS at 09:08

## 2022-08-23 RX ADMIN — IPRATROPIUM BROMIDE AND ALBUTEROL SULFATE 3 ML: 2.5; .5 SOLUTION RESPIRATORY (INHALATION) at 12:08

## 2022-08-23 RX ADMIN — ALLOPURINOL 100 MG: 100 TABLET ORAL at 08:08

## 2022-08-23 RX ADMIN — LACTULOSE 15 G: 10 SOLUTION ORAL at 01:08

## 2022-08-23 RX ADMIN — ALUMINUM HYDROXIDE, MAGNESIUM HYDROXIDE, AND SIMETHICONE 30 ML: 200; 200; 20 SUSPENSION ORAL at 09:08

## 2022-08-23 RX ADMIN — INSULIN ASPART 4 UNITS: 100 INJECTION, SOLUTION INTRAVENOUS; SUBCUTANEOUS at 05:08

## 2022-08-23 RX ADMIN — LEVOTHYROXINE SODIUM 88 MCG: 88 TABLET ORAL at 05:08

## 2022-08-23 RX ADMIN — MUPIROCIN: 20 OINTMENT TOPICAL at 08:08

## 2022-08-23 RX ADMIN — OXYCODONE HYDROCHLORIDE 10 MG: 5 TABLET ORAL at 04:08

## 2022-08-23 RX ADMIN — METOPROLOL SUCCINATE 200 MG: 50 TABLET, EXTENDED RELEASE ORAL at 08:08

## 2022-08-23 RX ADMIN — ALUMINUM HYDROXIDE, MAGNESIUM HYDROXIDE, AND SIMETHICONE 30 ML: 200; 200; 20 SUSPENSION ORAL at 05:08

## 2022-08-23 RX ADMIN — HYDROMORPHONE HYDROCHLORIDE 2 MG: 2 INJECTION, SOLUTION INTRAMUSCULAR; INTRAVENOUS; SUBCUTANEOUS at 10:08

## 2022-08-23 RX ADMIN — GABAPENTIN 800 MG: 400 CAPSULE ORAL at 03:08

## 2022-08-23 RX ADMIN — METHOCARBAMOL TABLETS 750 MG: 750 TABLET, COATED ORAL at 03:08

## 2022-08-23 RX ADMIN — METHOCARBAMOL TABLETS 750 MG: 750 TABLET, COATED ORAL at 08:08

## 2022-08-23 RX ADMIN — HEPARIN SODIUM 5000 UNITS: 5000 INJECTION INTRAVENOUS; SUBCUTANEOUS at 05:08

## 2022-08-23 RX ADMIN — ATORVASTATIN CALCIUM 20 MG: 20 TABLET, FILM COATED ORAL at 08:08

## 2022-08-23 RX ADMIN — SERTRALINE HYDROCHLORIDE 100 MG: 100 TABLET ORAL at 08:08

## 2022-08-23 RX ADMIN — IPRATROPIUM BROMIDE AND ALBUTEROL SULFATE 3 ML: 2.5; .5 SOLUTION RESPIRATORY (INHALATION) at 07:08

## 2022-08-23 RX ADMIN — OXYCODONE HYDROCHLORIDE 10 MG: 5 TABLET ORAL at 01:08

## 2022-08-23 RX ADMIN — OXYCODONE HYDROCHLORIDE 10 MG: 5 TABLET ORAL at 08:08

## 2022-08-23 RX ADMIN — DEXAMETHASONE SODIUM PHOSPHATE 4 MG: 4 INJECTION, SOLUTION INTRA-ARTICULAR; INTRALESIONAL; INTRAMUSCULAR; INTRAVENOUS; SOFT TISSUE at 05:08

## 2022-08-23 RX ADMIN — TACROLIMUS 1 MG: 1 CAPSULE ORAL at 05:08

## 2022-08-23 RX ADMIN — TRAZODONE HYDROCHLORIDE 50 MG: 50 TABLET ORAL at 08:08

## 2022-08-23 RX ADMIN — HEPARIN SODIUM 5000 UNITS: 5000 INJECTION INTRAVENOUS; SUBCUTANEOUS at 01:08

## 2022-08-23 RX ADMIN — HYDROMORPHONE HYDROCHLORIDE 2 MG: 2 INJECTION, SOLUTION INTRAMUSCULAR; INTRAVENOUS; SUBCUTANEOUS at 02:08

## 2022-08-23 RX ADMIN — DEXAMETHASONE SODIUM PHOSPHATE 4 MG: 4 INJECTION, SOLUTION INTRA-ARTICULAR; INTRALESIONAL; INTRAMUSCULAR; INTRAVENOUS; SOFT TISSUE at 10:08

## 2022-08-23 RX ADMIN — INSULIN ASPART 2 UNITS: 100 INJECTION, SOLUTION INTRAVENOUS; SUBCUTANEOUS at 08:08

## 2022-08-23 RX ADMIN — HYDROMORPHONE HYDROCHLORIDE 2 MG: 2 INJECTION, SOLUTION INTRAMUSCULAR; INTRAVENOUS; SUBCUTANEOUS at 05:08

## 2022-08-23 RX ADMIN — GABAPENTIN 800 MG: 400 CAPSULE ORAL at 08:08

## 2022-08-23 RX ADMIN — CEFAZOLIN SODIUM 2 G: 2 SOLUTION INTRAVENOUS at 01:08

## 2022-08-23 RX ADMIN — LISINOPRIL 40 MG: 20 TABLET ORAL at 08:08

## 2022-08-23 RX ADMIN — HYDRALAZINE HYDROCHLORIDE 10 MG: 20 INJECTION, SOLUTION INTRAMUSCULAR; INTRAVENOUS at 03:08

## 2022-08-23 RX ADMIN — NIFEDIPINE 60 MG: 30 TABLET, FILM COATED, EXTENDED RELEASE ORAL at 08:08

## 2022-08-23 RX ADMIN — TACROLIMUS 2 MG: 1 CAPSULE ORAL at 08:08

## 2022-08-23 NOTE — PROGRESS NOTES
[place] [facility]  Neurosurgery  Progress Note    Subjective:     Interval History:  Has some pain in the left medial arm and cramping in the left hand.  Mild pain in the front of the neck.  Right arm weakness.  He is having some difficulty swallowing.  Has not been out of bed yet.    History of Present Illness:  This is a very pleasant 66 y.o. male who is referred with the above symptoms.    He is s/p L CTR with continued left arm pain.  EMG ->              1. B CTS, mild              2. B ulnar neuropathy at wrist, mod              3. Chronic C7 > C5, C6 radiculopathies  Denies LE clumsiness or B/B dysfunction    Post-Op Info:  Procedure(s) (LRB):  Procedure: ACDF 4-7 LOS: 4.0 Anesthesia: General Blood: Type & Screen Radiology: C-Arm Microscope: ------- SNS: EMG, SEP, MEP Brace: [place] Bed: Regular Bed, Shoulder Strap Headrest:------ Position: Supine Equipment: [email or web address] (N/A)   1 Day Post-Op      Medications:  Continuous Infusions:  Scheduled Meds:   acetaminophen  1,000 mg Oral Q8H    albuterol-ipratropium  3 mL Nebulization Q4H WAKE    allopurinoL  100 mg Oral BID    atorvastatin  20 mg Oral Daily    bisacodyL  10 mg Rectal Daily    dexamethasone  4 mg Intravenous Q6H    gabapentin  800 mg Oral TID    heparin (porcine)  5,000 Units Subcutaneous Q8H    lactulose  15 g Oral Daily    levothyroxine  88 mcg Oral Before breakfast    lisinopriL  40 mg Oral Daily    methocarbamoL  750 mg Oral TID    metoprolol succinate  200 mg Oral Daily    mupirocin   Nasal BID    NIFEdipine  60 mg Oral Daily    sertraline  100 mg Oral Daily    tacrolimus  1 mg Oral Daily PM    tacrolimus  2 mg Oral Daily AM    traZODone  50 mg Oral QHS     PRN Meds:aluminum-magnesium hydroxide-simethicone, dextrose 10%, dextrose 10%, glucagon (human recombinant), glucose, glucose, hydrALAZINE, HYDROmorphone, insulin aspart U-100, labetalol, ondansetron, oxyCODONE, oxyCODONE, prochlorperazine, sars-cov-2 (covid-19), senna-docusate  "8.6-50 mg, senna-docusate 8.6-50 mg, sodium chloride 0.9%, sodium chloride 0.9%     Review of Systems  Objective:     Weight: 122.6 kg (270 lb 4.5 oz)  Body mass index is 34.7 kg/m².  Vital Signs (Most Recent):  Temp: 98 °F (36.7 °C) (08/23/22 0845)  Pulse: 96 (08/23/22 0845)  Resp: 20 (08/23/22 0845)  BP: (!) 182/93 (08/23/22 0845)  SpO2: 96 % (08/23/22 0845) Vital Signs (24h Range):  Temp:  [96.8 °F (36 °C)-98.8 °F (37.1 °C)] 98 °F (36.7 °C)  Pulse:  [] 96  Resp:  [6-32] 20  SpO2:  [94 %-100 %] 96 %  BP: (153-219)/() 182/93                          Closed/Suction Drain 08/22/22 1123 Right;Anterior Neck Accordion 10 Fr. (Active)   Site Description Unable to view 08/22/22 1905   Dressing Type Gauze 08/22/22 1905   Dressing Status Clean;Dry;Intact 08/22/22 1905   Dressing Intervention Integrity maintained 08/22/22 1905   Drainage Bloody 08/22/22 1905   Status To bulb suction 08/22/22 1905   Output (mL) 5 mL 08/22/22 1220            Urethral Catheter 08/22/22 0726 Non-latex;Straight-tip 16 Fr. (Active)   Site Assessment Clean;Intact 08/22/22 1905   Collection Container Urimeter 08/22/22 1905   Securement Method secured to top of thigh w/ adhesive device 08/22/22 1905   Catheter Care Performed yes 08/22/22 1618   Reason for Continuing Urinary Catheterization Post operative 08/22/22 1905   CAUTI Prevention Bundle Securement Device in place with 1" slack;Drainage bag/urimeter off the floor 08/22/22 1905   Output (mL) 45 mL 08/23/22 0600       Neurosurgery Physical Exam     General: well developed, well nourished, no distress  Mental Status: Awake, Alert, Oriented X3.Thought content appropriate  GCS: Motor: 6/Verbal: 5/Eyes: 4 GCS Total: 15    Motor Strength:  Strength  Deltoids Triceps Biceps Wrist Extension Wrist Flexion Hand    Upper: R 5/5 4/5 5/5 5/5 5/5 5/5    L 5/5 4/5 5/5 5/5 5/5 5/5     HF KF KE DF PF EHL   Lower: R 5/5 5/5 5/5 5/5 5/5 5/5    L 5/5 5/5 5/5 5/5 5/5 5/5       Gloria: present " B/L  Clonus: absent B/L  Incision- CDI  Drain- 50        Significant Labs:  Recent Labs   Lab 08/22/22  1329   *      K 4.3      CO2 20*   BUN 14   CREATININE 1.1   CALCIUM 9.4     Recent Labs   Lab 08/22/22  1329   WBC 8.45   HGB 12.7*   HCT 37.9*   *     No results for input(s): LABPT, INR, APTT in the last 48 hours.  Microbiology Results (last 7 days)     ** No results found for the last 168 hours. **            Assessment/Plan:     Active Diagnoses:    Diagnosis Date Noted POA    PRINCIPAL PROBLEM:  Myeloradiculopathy [G54.9] 08/22/2022 Yes    Diabetes mellitus type II, uncontrolled [E11.65] 10/04/2016 Yes    Essential hypertension [I10] 06/19/2015 Yes    Hypothyroidism [E03.9]  Yes    Thrombocytopenia [D69.6] 06/16/2010 Yes      Problems Resolved During this Admission:       A/P:  POD # C4-7 ACDF     --Neurologically stable          -q4h neuro checks  --All pertinent labs and diagnostics personally reviewed  --Imaging: Post op xrays pending  --Drains: Continue  --Pain control: Tylenol 1000mg Q 8 hours, Oxycodone IR 5mg Q 4 hours PRN, Oxycodone IR 10mg Q 4 hours PRN, Robaxin 750mg TID, Dilaudid 2mg IV Q 3 hours PRN severe pain,   --DVT ppx: SCDs/SQH  --Activity: PT/OT, OOB. C-collar.  [facility] contacted for brace  --Diet: Regular, Heplock IV  --Bowel regimen: PRN suppository, senna PRN, lactulose daily  --Urinary: Voiding spontaneously  --Atelectasis ppx: Encourage IS hourly  --Decadron 4mg Q6 hrs for 48 hours for swallowing difficulty  --Consult ST for swallow eval  --Appreciate HM recs      Dispo: Pending PT/OT recs/ imaging/drains    All of the above discussed and reviewed with Dr. Gino Gilbert, PA-C  Neurosurgery  [place] - [facility]

## 2022-08-23 NOTE — OP NOTE
Date of Procedure: 8/22/2022       Pre-Operative Diagnosis: Cervical radiculopathy [M54.12]  Cervical spinal stenosis [M48.02]  Cervical myelopathy [G95.9]    Post-Operative Diagnosis: Post-Op Diagnosis Codes:     * Cervical radiculopathy [M54.12]     * Cervical spinal stenosis [M48.02]     * Cervical myelopathy [G95.9]    Anesthesia: General    Procedures performed:  ACDF C4/5, C5/6, C6/7     Surgeon: Titi Christian MD    Assistant:: Yohana Love PA-C, scrubbed and assisting all portions, no resident available.    Indication for Procedure: Vick Gonzalez is a 66 y.o. male with a past medical history significant for DM II (A1C 9.7), HTN, HLD, liver transplant (2/2 end-stage liver disease hepatitis C and prior alcohol abuse), hypothyroidism, and anxiety and who presents for C4-C7 ACDF for management of myeloradiculopathy. Patient presented to clinic with signs and symptoms of left hand pain and myelopathy. MRI revealing severe canal stenosis C4-C7.     Operative Note:    After informed consent was obtained and placed in the medical record, the patient was taken to the operating room.  General anesthesia was established.  Patient was positioned supine, with traction on the shoulders to facilitate visualization of the cervical spine.  A skin incision was marked over the C4/5 disc space, then a sterile prep and drape was performed in the usual fashion.  The skin was infiltrated with local anesthetic.  An incision was made with a 10 blade knife.  Bleeding was controlled with bipolar electrocautery.  Blunt dissection was used in the subdermal layer to expose the platysma.  The medial border of the sternocleidomastoid was palpated.  The platysma was divided longitudinally over this medial border.  A combination of blunt and sharp dissection was used to develop a plane between the carotid sheath and the tracheoesophageal structures.  Carotid pulse was palpated during the dissection and kept lateral.  Once the plane was  fully developed, hand-held retractors were used to keep the tracheoesophageal structures protected.  The prevertebral fascia was divided sharply over the cervical spine.  We used a Kittner to bluntly dissect the anterior spine and palpate for the disc space.  The disc space was cannulated with a spinal needle, then fluoroscopy was used to confirm the C4/5 space.  We used bipolar on the medial border of the longus colli, then sharply divided the attachments and reflected the longus colli off the disc space.  Self-retaining retractors were used to keep these muscles lateral and protected.  We then brought in the operating microscope and used a 15 blade to sharply incise the annulus of the disc.  Anterior osteophytes were removed Leksell and Kerrison rongeurs.  Curettes were used to strip the bony endplates of the soft disc, and pituitary rongeur were used to remove disc fragments.  A drill was used to clean the bony endplates as well as drilled the posterior osteophytes and medial uncovertebral joints.  Curettes and nerve hooks were used to strip the posterior longitudinal ligament from the vertebral body and exposed the epidural space.  Kerrison punches were used to remove the posterior longitudinal ligament and posterior bony osteophytes from the spinal canal.  A nerve hook was used to sweep in the epidural space for free disc fragments and these fragments were removed with pituitary rongeurs.  The bilateral neuroforamen were palpated with a nerve hook and cleaned of bony osteophytes with Kerrison punch.  We then sized for an interbody spacer, and placed DePuy spacer without difficulty.  The spacer was filled with viable cell allograft.  We then turned out attention sequentially to the next two caudal levels, performing discecomies and prep in identical fashion with the same spacers and allograft.  No changes in neuromonitoring were noted during the decompressions.  The levels were secured with and anterior plate  spanning C4-7.  The C7 screws were fixed angle, while the other six screws were variable.  AP and lateral films were obtained to confirm the placement of the hardware.  The incision was then irrigated with antibiotic irrigation.  We inspected for hemostasis which was obtained meticulously with bipolar.  A hemavac drain was left in the prevertebral space.  The self-retaining retractors were removed, and the platysma was reapproximated with 3-0 Vicryl sutures.  The dermal layer was closed with 3-0 Vicryl sutures.  The skin was closed with a running subcuticular Monocryl 4-0.  The incision was covered with Dermabond and a sterile dressing.  All sponge, needles and instrument counts were correct at the end of the procedure.    Neuromonitoring: stable    EBL: 100 ml    Complications: one    Specimen Sent: none

## 2022-08-23 NOTE — PLAN OF CARE
AAOx4. C/o pain, PRN pain meds given. Tolerating full liquid diet. Anterior neck incision with dermabond intact. Hemovac drain intact. Wilson collar intact. Ambulating with assist and walker. Blood glucose monitoring maintained. Bed locked in lowest position, bed alarm set and call bell within reach.

## 2022-08-23 NOTE — PT/OT/SLP EVAL
Physical Therapy Evaluation    Patient Name:  Vick Gonzalez   MRN:  [id number]    Recommendations:     Discharge Recommendations:  home health OT, home health PT (with progression to Outpatient PT/OT)   Discharge Equipment Recommendations: walker, rolling, bath bench   Barriers to discharge: Decreased caregiver support    Assessment:     Vick Gonzalez is a 66 y.o. male admitted with a medical diagnosis of Myeloradiculopathy s/p ACDF C4-7.   He presents with the following impairments/functional limitations:  weakness, gait instability, decreased ROM, impaired endurance, impaired balance, decreased lower extremity function, decreased safety awareness, impaired muscle length, impaired self care skills, pain, impaired skin, orthopedic precautions, impaired functional mobility.Patient seen for physical therapy evaluation on this date.  Patient without [place] collar on first PT attempt and observed performing cervical rotation.  Patient then seen for full PT evaluation with [place] collar.  Patient educated on importance of cervical precautions.  Anticipate patient will benefit from Home Health PT/OT with progression to Outpatient PT/OT.  Patient would also benefit from a RW and a tub bench at home.    Rehab Prognosis: Fair; patient would benefit from acute skilled PT services to address these deficits and reach maximum level of function.    Recent Surgery: Procedure(s) (LRB):  Procedure: ACDF 4-7 LOS: 4.0 Anesthesia: General Blood: Type & Screen Radiology: C-Arm Microscope: ------- SNS: EMG, SEP, MEP Brace: [place] Bed: Regular Bed, Shoulder Strap Headrest:------ Position: Supine Equipment: Depuy (N/A)  DECOMPRESSION AND FUSION, SPINE, CERVICAL, ANTERIOR APPROACH 1 Day Post-Op    Plan:     During this hospitalization, patient to be seen daily to address the identified rehab impairments via gait training, therapeutic activities, therapeutic exercises, neuromuscular re-education and progress toward the following  goals:    · Plan of Care Expires:  09/22/22    Subjective     Chief Complaint: complains of lightheadedness upon sitting. BP taken:  1718/99.  Patient reports he usually runs into the 200s for systolic.  Nsg aware.    Patient/Family Comments/goals: To return home  Pain/Comfort:  · Pain Rating 1: 7/10  · Location - Side 1: Bilateral  · Location - Orientation 1: generalized  · Location 1: neck  · Pain Addressed 1: Pre-medicate for activity, Distraction, Cessation of Activity  · Pain Rating Post-Intervention 1: 10/10  · Pain Rating 2: 7/10  · Location - Side 2: Left  · Location - Orientation 2: generalized  · Location 2: hand  · Pain Addressed 2: Distraction  · Pain Rating Post-Intervention 2:  (does not rate)    Patients cultural, spiritual, Jehovah's witness conflicts given the current situation: no    Living Environment:  Patient lives with spouse (works full time) in an apartment with no KEVIN.  T/S in bathroom.    Prior to admission, patients level of function was patient used cane occasionally for gait, reports neuropathy in B hands, L>R, Independent with ADLs, + Drivng.  Wife works full time.  Patient does not work.  Equipment used at home: cane, straight.  DME owned (not currently used): none.  Upon discharge, patient will have assistance from limited from spouse.    Objective:     Communicated with nurse Anderson prior to session.  Patient found supine with bed alarm, peripheral IV  upon PT entry to room.    General Precautions: Standard, fall   Orthopedic Precautions:spinal precautions (cervical spine precautions)   Braces: [place] collar  Respiratory Status: Room air    Exams:  · Gross Motor Coordination:  minimally decreased B LEs with gait/transfers  · Postural Exam:  Patient presented with the following abnormalities:    · -       Rounded shoulders  · -       Forward head  · -       Abnormal trunk flexion and B hip flexion in standing and with gait  · Sensation:    · -       Impaired  B hands with significant  impairment to Light Touch and Proprioception.  B LEs intact.  · Skin Integrity/Edema:      · -       Skin integrity: Dressing intact to anterior neck, drain in place.    · -       Edema: Patient states he usually has B LE edema, none on eval  · RLE ROM: WFL  · RLE Strength: WFL  · LLE ROM: WFL  · LLE Strength: WFL    Functional Mobility:  · Bed Mobility:   VC's for cervical precautions  · Rolling Right: stand by assistance  · Supine to Sit: stand by assistance  · Transfers:     · Sit to Stand:  contact guard assistance with no AD, slightly unsteady.    · Gait: Patient stands initially without AD, forward trunk and head flexion, CG/Min A required due to decreased COM over JULEE.  Therapist obtained RW, patient then able to ambulate with RW x 125' with CG/SBA, VC's on technique and maintaining upright posture.  Patient maintains 20 degree B hip flexion with gait, despite verbal and tactile cues.    · Balance: Seated EOB:  Complains of lightheadedness, no LOB, SBA    Standing:  Without AD:  CG/Min A, flexed trunk and hip flexors  With AD:  CG/SBA    Therapeutic Activities and Exercises:   Educated on spine precautions for cervical spine and use of [place] collar.  Educated on technique with RW with gait.  Educated on increasing trunk extension with standing and gait.      AM-PAC 6 CLICK MOBILITY  Total Score:18     Patient left up in chair with call button in reach and Monica notified.    GOALS:   Multidisciplinary Problems     Physical Therapy Goals        Problem: Physical Therapy    Goal Priority Disciplines Outcome Goal Variances Interventions   Physical Therapy Goal     PT, PT/OT Ongoing, Progressing     Description: Goals to be met by: 2022    Patient will increase functional independence with mobility by performin. Supine to sit with Modified [place]  2. Sit to supine with Modified [place]  3. Rolling to Left and Right with Modified [place].  4. Sit to stand transfer with Modified  [place]  5. Gait  x 200 feet with Modified [place] using Rolling Walker.                      History:     Past Medical History:   Diagnosis Date    Anemia     Anxiety     Chronic pain syndrome 7/13/2011    CKD (chronic kidney disease), stage III     Diabetes mellitus type II, uncontrolled     Discitis of lumbosacral region 1/16/2015    ED (erectile dysfunction)     Encounter for blood transfusion     Genital herpes     Gout, arthritis     History of alcohol abuse     History of hepatitis C, s/p successful Rx w/ SVR24 (cure) - 5/2018 8/23/2011    Completed 24wks Epclusa + RBV w/SVR12 - 2/2018  -     History of positive PPD, treatment status unknown     Pulmonary granulomas, negative sputum cultures for AFB and indeterminate quantferon test    History of substance abuse     Hypertension     Hypothyroidism     Liver replaced by transplant 8/23/2011    DATE: 12/16/2013  LIVER BIOPSY : REASON:  hep C staging  PATHOLOGY COMMITTEE NOTE/PLAN: :grade  1 / stage 1        Pancreatitis 2016    Peptic ulcer disease        Past Surgical History:   Procedure Laterality Date    ANTERIOR CERVICAL DISCECTOMY W/ FUSION N/A 8/22/2022    Procedure: Procedure: ACDF 4-7 LOS: 4.0 Anesthesia: General Blood: Type & Screen Radiology: C-Arm Microscope: ------- SNS: EMG, SEP, MEP Brace: [place] Bed: Regular Bed, Shoulder Strap Headrest:------ Position: Supine Equipment: [employer];  Surgeon: Titi Christian MD;  Location: [facility] OR;  Service: Neurosurgery;  Laterality: N/A;  Depuy  confirmed CW 8/19  Neuro monitoring confirmed CW 8/19    CARPAL TUNNEL RELEASE Left 10/29/2021    Procedure: RELEASE, CARPAL TUNNEL;  Surgeon: Jameson Alejo Jr., MD;  Location: [facility] OR;  Service: Orthopedics;  Laterality: Left;    CHOLECYSTECTOMY      DECOMPRESSION OF CERVICAL SPINE BY ANTERIOR APPROACH WITH FUSION  8/22/2022    Procedure: DECOMPRESSION AND FUSION, SPINE, CERVICAL, ANTERIOR APPROACH;  Surgeon: Titi Christian MD;   Location: [facility] OR;  Service: Neurosurgery;;    INJECTION OF JOINT Right 12/2/2019    Procedure: INJECTION, JOINT SI;  Surgeon: Thu Penn MD;  Location: [facility] PAIN MGT;  Service: Pain Management;  Laterality: Right;  RT SI JNT INJ    LIVER TRANSPLANT  06/2010    SPINE SURGERY         Time Tracking:     PT Received On: 08/23/22  PT Start Time: 1045     PT Stop Time: 1115  PT Total Time (min): 30 min Co-evaluation with OT    Billable Minutes: Evaluation 15 and Gait Training 15      08/23/2022

## 2022-08-23 NOTE — NURSING
Giordano DC per orders. Patient due to void by noon. Incisional dressing with small drainage. Dressing reinforced.

## 2022-08-23 NOTE — PLAN OF CARE
Litzy met with pt to discuss d/c planning. Pt lives alone. Pt stated his biggest support system is his friend Cely [phone]. Pt has a rolling walker but he does not use it often. Pt requested a glucometer upon d/c. Sw will attempt to get pt a glucometer upon d/c. Pt is not current with  services. Pt stated he has trouble affording some of his medications. Gracia with Care Management will provide pt with resources on affording medications. Pt drives himself to doctor appointments and Cely will transport pt home at the time of d/c. Litzy encouraged pt to call with any questions or concerns. Litzy will continue to follow pt for d/c planning.     Future Appointments   Date Time Provider Department Center   9/6/2022  8:45 AM MARCIE [facility]US[facility]   9/7/2022  8:30 AM Paige Gilbert PA-C [facility]   10/4/2022  9:40 AM Titi Christian MD [facility]   10/24/2022  9:45 AM LAB, [facility] [place]         08/23/22 0920   Discharge Assessment   Assessment Type Discharge Planning Assessment   Confirmed/corrected address, phone number and insurance Yes   Confirmed Demographics Correct on Facesheet   Source of Information patient   Lives With alone   Facility Arrived From: home   Do you expect to return to your current living situation? Yes   Do you have help at home or someone to help you manage your care at home? Yes   Who are your caregiver(s) and their phone number(s)? pt's friend Cely [phone]   Prior to hospitilization cognitive status: Alert/Oriented   Current cognitive status: Alert/Oriented   Walking or Climbing Stairs Difficulty ambulation difficulty, requires equipment   Dressing/Bathing Difficulty none   Equipment Currently Used at Home walker, rolling   Readmission within 30 days? No   Patient currently being followed by outpatient case management? No   Do you currently have service(s) that help you manage your care at home? No   Do you take  prescription medications? Yes   Do you have prescription coverage? Yes   Coverage [employer]   Do you have any problems affording any of your prescribed medications? No   Is the patient taking medications as prescribed? yes   Who is going to help you get home at discharge? pt's friend Cely [phone]   How do you get to doctors appointments? car, drives self   Are you on dialysis? No   Discharge Plan A Home with family   Discharge Plan B Home Health   DME Needed Upon Discharge  glucometer   Discharge Plan discussed with: Patient   Discharge Barriers Identified None

## 2022-08-23 NOTE — PLAN OF CARE
Patient on oxygen in no apparent distress, given arousal and IS treatment, no adverse reactions will continue to monitor.

## 2022-08-23 NOTE — PHARMACY MED REC
"[facility]           Pharmacy  Admission Medication Reconciliation     The home medication history was taken by Vinay Horton PharmD.      Medication history obtained from Medications listed below were obtained from: Patient/family    Based on information gathered for medication list, you may go to "Admission" then "Reconcile Home Medications" tabs to review and/or act upon those items.      The home medication list has been updated by the Pharmacy department.    Please read ALL comments highlighted in yellow.    Please address this information as you see fit.     Feel free to contact us if you have any questions or require assistance.    The current inpatient medication list has been compared to the home medication list and the following discrepancies were noted:     Patient reports NOT TAKING the following medication(s):  o Gabapentin 300mg    Patient reports he/she IS TAKING the following which was not ordered upon admit  o Rosuvastatin 5mg daily      No current facility-administered medications on file prior to encounter.     Current Outpatient Medications on File Prior to Encounter   Medication Sig Dispense Refill    allopurinoL (ZYLOPRIM) 100 MG tablet TAKE 1 TABLET BY MOUTH TWICE A DAY (Patient taking differently: Take 100 mg by mouth 2 (two) times a day.) 60 tablet 7    aluminum-magnesium hydroxide-simethicone (MAALOX) 200-200-20 mg/5 mL Susp Take 30 mLs by mouth 4 (four) times daily before meals and nightly. 769 mL 0    aspirin 81 MG Chew Take 1 tablet (81 mg total) by mouth once daily. (Patient taking differently: Take 81 mg by mouth once daily. Resume after surgery) 90 tablet 3    BD ULTRA-FINE MENDY PEN NEEDLES 32 gauge x 5/32" Ndle       blood sugar diagnostic (TRUE METRIX GLUCOSE TEST STRIP) Strp USE 3 TIMES DAILY TO TEST BLOOD GLUCOSE LEVEL 100 strip 11    calcium carbonate-vitamin D3 (CALCIUM 600 WITH VITAMIN D3) 600 mg(1,500mg) -400 unit Chew Take 1 tablet by mouth " "once daily.       flash glucose sensor (FREESTYLE PALLAVI 2 SENSOR) Kit One sensor every 14 days 2 kit prn    gabapentin (NEURONTIN) 800 MG tablet TAKE 1 TABLET BY MOUTH THREE TIMES A DAY 90 tablet 3    glucose 4 GM chewable tablet Take 4 tablets (16 g total) by mouth as needed for Low blood sugar (If having symptoms of blurry vision, palpitations, confusion, shakiness.  Please check sugars and if sugar below 70 please take 4 tablets and re-check sugar everry 15 minutes until sugars are above 70 and symptoms resolve.). 50 tablet 12    insulin glargine U-300 conc (TOUJEO MAX U-300 SOLOSTAR) 300 unit/mL (3 mL) insulin pen Inject 40 Units into the skin once daily. 3 mL 11    insulin lispro 100 unit/mL pen Inject 20 Units into the skin 3 (three) times daily with meals. 15 mL 11    lancets 30 gauge Misc 1 lancet by Misc.(Non-Drug; Combo Route) route 4 (four) times daily before meals and nightly. 200 each 11    levothyroxine (SYNTHROID) 88 MCG tablet TAKE 1 TABLET BY MOUTH EVERY DAY (Patient taking differently: Take 88 mcg by mouth before breakfast.) 90 tablet 4    lisinopriL (PRINIVIL,ZESTRIL) 40 MG tablet TAKE 1 TABLET BY MOUTH EVERY DAY 90 tablet 3    metoprolol succinate (TOPROL-XL) 200 MG 24 hr tablet Take 1 tablet (200 mg total) by mouth once daily. 90 tablet 3    multivitamin (THERAGRAN) per tablet Take 1 tablet by mouth once daily.       NIFEdipine (ADALAT CC) 60 MG TbSR TAKE 1 TABLET BY MOUTH EVERY DAY 90 tablet 11    NOVOFINE PLUS 32 gauge x 1/6" Ndle       ondansetron (ZOFRAN-ODT) 4 MG TbDL Take 1 tablet (4 mg total) by mouth every 6 (six) hours as needed (Nausea). 15 tablet 0    pen needle, diabetic (BD ULTRA-FINE MENDY PEN NEEDLE) 32 gauge x 5/32" Ndle TEST WITH NOVOLOG THREE TIMES A DAY WITH MEALS AND WITH LEVEMIR AT BEDTIME 100 each 11    rosuvastatin (CRESTOR) 5 MG tablet TAKE 1 TABLET BY MOUTH EVERY DAY 90 tablet 11    sertraline (ZOLOFT) 100 MG tablet TAKE 1 TABLET (100 MG TOTAL) BY MOUTH " ONCE DAILY. 30 tablet 7    tacrolimus (PROGRAF) 1 MG Cap TAKE 2 CAPSULES (2 MG TOTAL) BY MOUTH IN THE MORNING & TAKE 1 CAPSULE (1 MG TOTAL) IN THE EVENING. 90 capsule 11    traZODone (DESYREL) 50 MG tablet TAKE 1 TABLET (50 MG TOTAL) BY MOUTH EVERY EVENING. 30 tablet 11    TRUE METRIX GLUCOSE METER Misc TEST 4 (FOUR) TIMES DAILY BEFORE MEALS AND NIGHTLY. 1 each 0    albuterol (VENTOLIN HFA) 90 mcg/actuation inhaler Inhale 2 puffs into the lungs every 6 (six) hours as needed for Wheezing or Shortness of Breath. Rescue 8.5 g 1    gabapentin (NEURONTIN) 300 MG capsule Take 1 capsule (300 mg total) by mouth every evening. (Patient not taking: No sig reported) 30 capsule 2    glucose 4 GM chewable tablet Take 4 tablets (16 g total) by mouth as needed for Low blood sugar.  12    HYDROcodone-acetaminophen (NORCO) 5-325 mg per tablet 22 tablets.      [DISCONTINUED] insulin aspart U-100 (NOVOLOG FLEXPEN U-100 INSULIN) 100 unit/mL (3 mL) InPn pen Inject 20 Units into the skin 3 (three) times daily with meals. 15 mL 11       Please address this information as you see fit.  Feel free to contact us if you have any questions or require assistance.    Vinay Horton, PharmD  [phone]                  .

## 2022-08-23 NOTE — PLAN OF CARE
Patient seen for physical therapy evaluation on this date.  Patient without [place] collar on first PT attempt and observed performing cervical rotation.  Patient then seen for full PT evaluation with [place] collar.  Patient educated on importance of cervical precautions.  Anticipate patient will benefit from Home Health PT/OT with progression to Outpatient PT/OT.  Patient would also benefit from a RW and a tub bench at home.      Problem: Physical Therapy  Goal: Physical Therapy Goal  Description: Goals to be met by: 2022    Patient will increase functional independence with mobility by performin. Supine to sit with Modified [place]  2. Sit to supine with Modified [place]  3. Rolling to Left and Right with Modified [place].  4. Sit to stand transfer with Modified [place]  5. Gait  x 200 feet with Modified [place] using Rolling Walker.     Outcome: Ongoing, Progressing

## 2022-08-23 NOTE — PLAN OF CARE
PT on RA, no respiratory distress noted. Will continue to monitor.  The proper method of use, as well as anticipated side effects, of this aerosol treatment are discussed and demonstrated to the patient.

## 2022-08-23 NOTE — PLAN OF CARE
Problem: SLP  Goal: SLP Goal  Description: Short Term Goals:  1. Pt will participate in swallow eval to determine safest diet level.  2. Pt will tolerate full liquid diet with no audible dysphagia signs.   3. Pt will tolerate advanced trials with no audible dysphagia signs.   Outcome: Ongoing, Progressing   Pt to be downgraded to full liquid diet with full aspiration precautions.

## 2022-08-23 NOTE — PT/OT/SLP EVAL
Speech Language Pathology Evaluation  Bedside Swallow    Patient Name:  Vick Gonzalez   MRN:  [id number]  Admitting Diagnosis: Myeloradiculopathy    Recommendations:                 General Recommendations:  ongoing swallow eval   Diet recommendations:  NPO, Full liquids   Aspiration Precautions: upright for meals, small sips/bites, alternate sips and bites, single straw swallows, crushed meds   General Precautions: Standard, fall  Communication strategies:  none    History per MD      There are no hospital problems to display for this patient.     Vick Gonzalez is a 66 y.o. male with a past medical history significant for DM II (A1C 9.7), HTN, HLD, liver transplant (2/2 end-stage liver disease hepatitis C and prior alcohol abuse), hypothyroidism, and anxiety and who presents for C4-C7 ACDF for management of myeloradiculopathy. Patient presented to clinic with signs and symptoms of left hand pain and myelopathy. MRI revealing severe canal stenosis C4-C7.        Past Medical History:   Diagnosis Date    Anemia     Anxiety     Chronic pain syndrome 7/13/2011    CKD (chronic kidney disease), stage III     Diabetes mellitus type II, uncontrolled     Discitis of lumbosacral region 1/16/2015    ED (erectile dysfunction)     Encounter for blood transfusion     Genital herpes     Gout, arthritis     History of alcohol abuse     History of hepatitis C, s/p successful Rx w/ SVR24 (cure) - 5/2018 8/23/2011    Completed 24wks Epclusa + RBV w/SVR12 - 2/2018  -     History of positive PPD, treatment status unknown     Pulmonary granulomas, negative sputum cultures for AFB and indeterminate quantferon test    History of substance abuse     Hypertension     Hypothyroidism     Liver replaced by transplant 8/23/2011    DATE: 12/16/2013  LIVER BIOPSY : REASON:  hep C staging  PATHOLOGY COMMITTEE NOTE/PLAN: :grade  1 / stage 1        Pancreatitis 2016    Peptic ulcer disease        Past Surgical History:  "  Procedure Laterality Date    ANTERIOR CERVICAL DISCECTOMY W/ FUSION N/A 8/22/2022    Procedure: Procedure: ACDF 4-7 LOS: 4.0 Anesthesia: General Blood: Type & Screen Radiology: C-Arm Microscope: ------- SNS: EMG, SEP, MEP Brace: [place] Bed: Regular Bed, Shoulder Strap Headrest:------ Position: Supine Equipment: Depuy;  Surgeon: Titi Christian MD;  Location: [facility] OR;  Service: Neurosurgery;  Laterality: N/A;  Depuy  confirmed CW 8/19  Neuro monitoring confirmed CW 8/19    CARPAL TUNNEL RELEASE Left 10/29/2021    Procedure: RELEASE, CARPAL TUNNEL;  Surgeon: Jameson Alejo Jr., MD;  Location: [facility] OR;  Service: Orthopedics;  Laterality: Left;    CHOLECYSTECTOMY      DECOMPRESSION OF CERVICAL SPINE BY ANTERIOR APPROACH WITH FUSION  8/22/2022    Procedure: DECOMPRESSION AND FUSION, SPINE, CERVICAL, ANTERIOR APPROACH;  Surgeon: Titi Christian MD;  Location: [facility] OR;  Service: Neurosurgery;;    INJECTION OF JOINT Right 12/2/2019    Procedure: INJECTION, JOINT SI;  Surgeon: Thu Penn MD;  Location: [facility] PAIN MGT;  Service: Pain Management;  Laterality: Right;  RT SI JNT INJ    LIVER TRANSPLANT  06/2010    SPINE SURGERY         Social History: Patient lives at home    Prior Intubation HX:  For surgery only     Modified Barium Swallow: n/a    Chest X-Rays: C6 and C7 vertebral levels are obscured on lateral projection by overlying soft tissues.  Interval postoperative changes of C4-C7 ACDF with interbody disc spacers.  Visualized hardware appears intact and projects in appropriate position.  Satisfactory alignment.  Multilevel degenerative changes.  No evidence of acute fracture or osseous destruction.  Expected postoperative prevertebral soft tissue swelling.  Surgical drain noted.     Prior diet: reg/thin prior to surgery    Subjective     Consult received for clinical swallow eval this date, SLP did communicate with RN prior to eval/treat.    Patient goals: "It feels like it gets stuck " "here."    Pain/Comfort:  · Pain Rating 1: 7/10  · Location 1:  (neck)  · Pain Addressed 1: Reposition, Nurse notified    Respiratory Status: room air     Objective:   Pt seen date for swallow eval s/p ACDF  Pt is awake and alert.   Pt follows commands.    Oral Musculature Evaluation  · Oral Musculature: WFL  · Dentition: present and adequate  · Secretion Management: problems swallowing secretions  · Mucosal Quality: good, adequate  · Mandibular Strength and Mobility: WFL  · Oral Labial Strength and Mobility: WFL  · Lingual Strength and Mobility: WFL  · Buccal Strength and Mobility: WFL  · Volitional Cough: elicited  · Volitional Swallow: multiple swallows  · Voice Prior to PO Intake: raspy voice, throat clearing    Bedside Swallow Eval:   Consistencies Assessed:  · Thin liquids water by tsp, cup and  straw   · Puree pudding by tsp   · Soft solids diced pears      Oral Phase:   · Prolonged mastication  · Orally expelled  · Slow oral transit time    Pharyngeal Phase:   · coughing/choking  · delayed swallow initation  · globus sensation  · multiple spontaneous swallows  · throat clearing    Compensatory Strategies  · Small sips/bites  · Oral care before and after meals  · Slow rate of intake    Treatment: Discussed diet recs with pt and reason for SLP consult. Pt indicated understanding of all info.  Pt to be downgraded to full liquids for now. SLP will follow up. Secure chat sent to MD and RN s/p session.     Assessment:     Vick Gonzalez is a 66 y.o. male admitted with Myeloradiculopathy who presents with an SLP diagnosis of dysphagia to solid textures.      Goals:   Multidisciplinary Problems     SLP Goals        Problem: SLP    Goal Priority Disciplines Outcome   SLP Goal     SLP Ongoing, Progressing   Description: Short Term Goals:  1. Pt will participate in swallow eval to determine safest diet level.  2. Pt will tolerate full liquid diet with no audible dysphagia signs.   3. Pt will tolerate advanced trials " with no audible dysphagia signs.                    Plan:     · Patient to be seen:  3 x/week   · Plan of Care expires:  09/21/22  · Plan of Care reviewed with:  patient   · SLP Follow-Up:  Yes       Discharge recommendations:  home health PT, home health OT   Barriers to Discharge:  none    Time Tracking:     SLP Treatment Date:   08/23/22  Speech Start Time:  0941  Speech Stop Time:  1002     Speech Total Time (min):  21 min    Billable Minutes: Eval Swallow and Oral Function 12 and Self Care/Home Management Training 9    08/23/2022

## 2022-08-23 NOTE — PLAN OF CARE
Pt would benefit from [facility] OT services in order to maximize functional independence. Recommending HH OT/PT upon d/c with family care/assistance & transition to OP PT. DME: RW, TTB. OT juan f performed this date with PT, pt agreeable to therapy. Pt educated on cervical precautions & wearing of Aspen collar. Pt performing functional mobility in room/hallway with CGA/Abigail & no AD, progressing to CGA/SBA with use of RW. Pt continues with c/o numbness to L hand which he reports present prior to sx.     Problem: Occupational Therapy  Goal: Occupational Therapy Goal  Description: Goals to be met by: 9/23/2022     Patient will increase functional independence with ADLs by performing:    LE Dressing with Set-up Assistance.  Toileting from toilet with Modified [place] for hygiene and clothing management.   Step transfer with Modified [place] with appropriate AD.  Toilet transfer to toilet with Modified [place] & appropriate AD.     Outcome: Ongoing, Progressing

## 2022-08-23 NOTE — PT/OT/SLP EVAL
Occupational Therapy   Evaluation    Name: Vick Gonzalez  MRN: [id number]  Admitting Diagnosis:  Myeloradiculopathy  Recent Surgery: Procedure(s) (LRB):  Procedure: ACDF 4-7 LOS: 4.0 Anesthesia: General Blood: Type & Screen Radiology: C-Arm Microscope: ------- SNS: EMG, SEP, MEP Brace: [place] Bed: Regular Bed, Shoulder Strap Headrest:------ Position: Supine Equipment: Depuy (N/A)  DECOMPRESSION AND FUSION, SPINE, CERVICAL, ANTERIOR APPROACH 1 Day Post-Op    Recommendations:     Discharge Recommendations: home health OT, home health PT, other (see comments) (with transition to OP PT)  Discharge Equipment Recommendations:  bath bench, walker, rolling  Barriers to discharge:  Other (Comment) (Pt requiers increased level of assistance)    Assessment:     Vick Gonzalez is a 66 y.o. male with a medical diagnosis of Myeloradiculopathy.  He presents with The encounter diagnosis was Myeloradiculopathy. Performance deficits affecting function: weakness, impaired endurance, gait instability, impaired balance, impaired self care skills, pain, orthopedic precautions, decreased upper extremity function, decreased lower extremity function, decreased ROM, impaired skin, decreased safety awareness, impaired functional mobility, impaired sensation, decreased coordination.      Pt would benefit from cont OT services in order to maximize functional independence. Recommending HH OT/PT upon d/c with family care/assistance & transition to OP PT. DME: RW, TTB. OT juan f performed this date with PT, pt agreeable to therapy. Pt educated on cervical precautions & wearing of Aspen collar. Pt performing functional mobility in room/hallway with CGA/Abigail & no AD, progressing to CGA/SBA with use of RW. Pt continues with c/o numbness to L hand which he reports present prior to sx.     Rehab Prognosis: Good; patient would benefit from acute skilled OT services to address these deficits and reach maximum level of function.       Plan:     Patient to  be seen 5 x/week to address the above listed problems via self-care/home management, therapeutic activities, therapeutic exercises  · Plan of Care Expires: 09/23/22  · Plan of Care Reviewed with: patient    Subjective     Chief Complaint: Pain in neck  Patient/Family Comments/goals: Return to PLOF    Occupational Profile:  Living Environment: spouse, 1st floor apt, 0STE, tub/shower combo  Previous level of function: Suki with SPC PRN  Roles and Routines: Drives, not currently working  Equipment Used at Home:  cane, straight  Assistance upon Discharge: Spouse, who works full time at [employer]     Pain/Comfort:  Pain Rating 1: 7/10  Location - Side 1: Bilateral  Location 1: neck  Pain Addressed 1: Reposition, Distraction, Nurse notified  Pain Rating Post-Intervention 1: 10/10  Pain Rating 2: 7/10  Location - Side 2: Left  Location 2: hand  Pain Addressed 2: Distraction  Pain Rating Post-Intervention 2: other (see comments) (not rated)    Patients cultural, spiritual, Scientologist conflicts given the current situation: no    Objective:     Communicated with: nsg prior to session.  Patient found HOB elevated with bed alarm, peripheral IV, SCD, cervical collar, hemovac upon OT entry to room.    General Precautions: Standard, fall   Orthopedic Precautions:spinal precautions (cervical precautions)   Braces: Aspen collar  Respiratory Status: Room air    Occupational Performance:    Bed Mobility:    · Patient completed Scooting/Bridging with stand by assistance  · Patient completed Supine to Sit with stand by assistance & HOB elevated with use of bed rails     Functional Mobility/Transfers:  · Patient completed Sit <> Stand Transfer with contact guard assistance  with  no assistive device   · Patient completed Bed <> Chair Transfer using Step Transfer technique with stand by assistance and contact guard assistance with rolling walker  · Functional Mobility: Pt performing functional mobility in room/hallway with  CGA/Abigail & no AD, progressing to CGA/SBA with use of RW. Pt noted with posterior lean, reports this was present prior to sx. Pt demonstrates increased stability with use of RW. Pt educated on use of RW for safety.     Activities of Daily Living:  · Lower Body Dressing: minimum assistance seated EOB to fausto shorts, pt educated on adaptive dressing strategies to maintain cervical precautions    Cognitive/Visual Perceptual:  Cognitive/Psychosocial Skills:     -       Oriented to: Person, Place, Time and Situation   -       Follows Commands/attention:Follows multistep  commands  -       Memory: No Deficits noted  -       Mood/Affect/Coping skills/emotional control: Cooperative and Pleasant    Physical Exam:  Sensation:    -       Impaired  light/touch L hand/fingers, deep pressure intact  Upper Extremity Range of Motion:     -       Right Upper Extremity: WFL within cervical precautions  -       Left Upper Extremity: WFL within cervical precautions  Upper Extremity Strength:    -       Right Upper Extremity: WFL  -       Left Upper Extremity: WFL   Strength:    -       Right Upper Extremity: WFL  -       Left Upper Extremity: WFL    AMPAC 6 Click ADL:  AMPAC Total Score: 20    Treatment & Education:  Pt would benefit from cont OT services in order to maximize functional independence.  OT juan f performed this date with PT, pt agreeable to therapy.   Pt educated on cervical precautions & wearing of Aspen collar.   Pt performing bed mobility as above, mild dizziness seated EOB. BP taken at 171/99, HR 87; nsg notified.   Pt performing functional mobility in room/hallway with CGA/Abigail & no AD, progressing to CGA/SBA with use of RW.   Pt continues with c/o numbness to L hand which he reports present prior to sx.   Pt continues with c/o mild dizziness at end of session.   Education:    Patient left up in chair with all lines intact, call button in reach, chair alarm on and nsg notified    GOALS:   Multidisciplinary Problems      Occupational Therapy Goals        Problem: Occupational Therapy    Goal Priority Disciplines Outcome Interventions   Occupational Therapy Goal     OT, PT/OT Ongoing, Progressing    Description: Goals to be met by: 9/23/2022     Patient will increase functional independence with ADLs by performing:    LE Dressing with Set-up Assistance.  Toileting from toilet with Modified [place] for hygiene and clothing management.   Step transfer with Modified [place] with appropriate AD.  Toilet transfer to toilet with Modified [place] & appropriate AD.                      History:     Past Medical History:   Diagnosis Date    Anemia     Anxiety     Chronic pain syndrome 7/13/2011    CKD (chronic kidney disease), stage III     Diabetes mellitus type II, uncontrolled     Discitis of lumbosacral region 1/16/2015    ED (erectile dysfunction)     Encounter for blood transfusion     Genital herpes     Gout, arthritis     History of alcohol abuse     History of hepatitis C, s/p successful Rx w/ SVR24 (cure) - 5/2018 8/23/2011    Completed 24wks Epclusa + RBV w/SVR12 - 2/2018  -     History of positive PPD, treatment status unknown     Pulmonary granulomas, negative sputum cultures for AFB and indeterminate quantferon test    History of substance abuse     Hypertension     Hypothyroidism     Liver replaced by transplant 8/23/2011    DATE: 12/16/2013  LIVER BIOPSY : REASON:  hep C staging  PATHOLOGY COMMITTEE NOTE/PLAN: :grade  1 / stage 1        Pancreatitis 2016    Peptic ulcer disease          Past Surgical History:   Procedure Laterality Date    ANTERIOR CERVICAL DISCECTOMY W/ FUSION N/A 8/22/2022    Procedure: Procedure: ACDF 4-7 LOS: 4.0 Anesthesia: General Blood: Type & Screen Radiology: C-Arm Microscope: ------- SNS: EMG, SEP, MEP Brace: [place] Bed: Regular Bed, Shoulder Strap Headrest:------ Position: Supine Equipment: [place];  Surgeon: Titi Christian MD;  Location: [facility] OR;  Service:  Neurosurgery;  Laterality: N/A;  Depuy  confirmed CW 8/19  Neuro monitoring confirmed CW 8/19    CARPAL TUNNEL RELEASE Left 10/29/2021    Procedure: RELEASE, CARPAL TUNNEL;  Surgeon: Jameson Alejo Jr., MD;  Location: [facility] OR;  Service: Orthopedics;  Laterality: Left;    CHOLECYSTECTOMY      DECOMPRESSION OF CERVICAL SPINE BY ANTERIOR APPROACH WITH FUSION  8/22/2022    Procedure: DECOMPRESSION AND FUSION, SPINE, CERVICAL, ANTERIOR APPROACH;  Surgeon: Titi Christian MD;  Location: [facility] OR;  Service: Neurosurgery;;    INJECTION OF JOINT Right 12/2/2019    Procedure: INJECTION, JOINT SI;  Surgeon: Thu Penn MD;  Location: [facility] PAIN MGT;  Service: Pain Management;  Laterality: Right;  RT SI JNT INJ    LIVER TRANSPLANT  06/2010    SPINE SURGERY         Time Tracking:     OT Date of Treatment: 08/23/22  OT Start Time: 1045  OT Stop Time: 1113  OT Total Time (min): 28 min    Billable Minutes:Evaluation 14 with PT    8/23/2022

## 2022-08-23 NOTE — PLAN OF CARE
Pt has had difficulty sleeping. C/o consistent tingling in left hand ; also numbness & tingling in feet, but this is  normal for him. Removed Giordano this morning around 0530. He has not voided since then. Till waiting on properly fitting neck brace to be able to get out of bed.

## 2022-08-24 ENCOUNTER — PATIENT MESSAGE (OUTPATIENT)
Dept: ADMINISTRATIVE | Facility: HOSPITAL | Age: 67
End: 2022-08-24
Payer: MEDICARE

## 2022-08-24 DIAGNOSIS — Z98.1 S/P CERVICAL SPINAL FUSION: Primary | ICD-10-CM

## 2022-08-24 DIAGNOSIS — G54.9 MYELORADICULOPATHY: ICD-10-CM

## 2022-08-24 LAB
POCT GLUCOSE: 246 MG/DL (ref 70–110)
POCT GLUCOSE: 281 MG/DL (ref 70–110)
POCT GLUCOSE: 291 MG/DL (ref 70–110)
POCT GLUCOSE: 308 MG/DL (ref 70–110)
POCT GLUCOSE: 357 MG/DL (ref 70–110)

## 2022-08-24 PROCEDURE — 94799 UNLISTED PULMONARY SVC/PX: CPT

## 2022-08-24 PROCEDURE — 25000242 PHARM REV CODE 250 ALT 637 W/ HCPCS: Performed by: PHYSICIAN ASSISTANT

## 2022-08-24 PROCEDURE — 25000003 PHARM REV CODE 250: Performed by: PHYSICIAN ASSISTANT

## 2022-08-24 PROCEDURE — 25000003 PHARM REV CODE 250: Performed by: INTERNAL MEDICINE

## 2022-08-24 PROCEDURE — 94640 AIRWAY INHALATION TREATMENT: CPT | Mod: XB

## 2022-08-24 PROCEDURE — 97110 THERAPEUTIC EXERCISES: CPT | Mod: CQ

## 2022-08-24 PROCEDURE — 97535 SELF CARE MNGMENT TRAINING: CPT | Mod: CO

## 2022-08-24 PROCEDURE — 94761 N-INVAS EAR/PLS OXIMETRY MLT: CPT

## 2022-08-24 PROCEDURE — 63600175 PHARM REV CODE 636 W HCPCS: Performed by: INTERNAL MEDICINE

## 2022-08-24 PROCEDURE — 63600175 PHARM REV CODE 636 W HCPCS: Performed by: PHYSICIAN ASSISTANT

## 2022-08-24 PROCEDURE — 25000003 PHARM REV CODE 250: Performed by: NURSE PRACTITIONER

## 2022-08-24 PROCEDURE — 97116 GAIT TRAINING THERAPY: CPT | Mod: CQ

## 2022-08-24 PROCEDURE — 92526 ORAL FUNCTION THERAPY: CPT

## 2022-08-24 PROCEDURE — 99900035 HC TECH TIME PER 15 MIN (STAT)

## 2022-08-24 PROCEDURE — 94760 N-INVAS EAR/PLS OXIMETRY 1: CPT

## 2022-08-24 RX ORDER — NIFEDIPINE 30 MG/1
30 TABLET, EXTENDED RELEASE ORAL ONCE
Status: COMPLETED | OUTPATIENT
Start: 2022-08-24 | End: 2022-08-24

## 2022-08-24 RX ORDER — HYDRALAZINE HYDROCHLORIDE 25 MG/1
25 TABLET, FILM COATED ORAL EVERY 12 HOURS
Status: DISCONTINUED | OUTPATIENT
Start: 2022-08-24 | End: 2022-08-26 | Stop reason: HOSPADM

## 2022-08-24 RX ORDER — INSULIN ASPART 100 [IU]/ML
5 INJECTION, SOLUTION INTRAVENOUS; SUBCUTANEOUS
Status: DISCONTINUED | OUTPATIENT
Start: 2022-08-24 | End: 2022-08-25

## 2022-08-24 RX ORDER — NIFEDIPINE 30 MG/1
90 TABLET, EXTENDED RELEASE ORAL DAILY
Status: DISCONTINUED | OUTPATIENT
Start: 2022-08-25 | End: 2022-08-26 | Stop reason: HOSPADM

## 2022-08-24 RX ADMIN — INSULIN DETEMIR 20 UNITS: 100 INJECTION, SOLUTION SUBCUTANEOUS at 04:08

## 2022-08-24 RX ADMIN — ATORVASTATIN CALCIUM 20 MG: 20 TABLET, FILM COATED ORAL at 08:08

## 2022-08-24 RX ADMIN — ALLOPURINOL 100 MG: 100 TABLET ORAL at 08:08

## 2022-08-24 RX ADMIN — SERTRALINE HYDROCHLORIDE 100 MG: 100 TABLET ORAL at 08:08

## 2022-08-24 RX ADMIN — INSULIN ASPART 6 UNITS: 100 INJECTION, SOLUTION INTRAVENOUS; SUBCUTANEOUS at 05:08

## 2022-08-24 RX ADMIN — HYDRALAZINE HYDROCHLORIDE 25 MG: 25 TABLET, FILM COATED ORAL at 08:08

## 2022-08-24 RX ADMIN — Medication 6 MG: at 08:08

## 2022-08-24 RX ADMIN — GABAPENTIN 800 MG: 400 CAPSULE ORAL at 08:08

## 2022-08-24 RX ADMIN — INSULIN ASPART 8 UNITS: 100 INJECTION, SOLUTION INTRAVENOUS; SUBCUTANEOUS at 05:08

## 2022-08-24 RX ADMIN — INSULIN ASPART 6 UNITS: 100 INJECTION, SOLUTION INTRAVENOUS; SUBCUTANEOUS at 12:08

## 2022-08-24 RX ADMIN — METHOCARBAMOL TABLETS 750 MG: 750 TABLET, COATED ORAL at 02:08

## 2022-08-24 RX ADMIN — HEPARIN SODIUM 5000 UNITS: 5000 INJECTION INTRAVENOUS; SUBCUTANEOUS at 05:08

## 2022-08-24 RX ADMIN — OXYCODONE HYDROCHLORIDE 10 MG: 5 TABLET ORAL at 01:08

## 2022-08-24 RX ADMIN — LEVOTHYROXINE SODIUM 88 MCG: 88 TABLET ORAL at 05:08

## 2022-08-24 RX ADMIN — HYDROMORPHONE HYDROCHLORIDE 2 MG: 2 INJECTION, SOLUTION INTRAMUSCULAR; INTRAVENOUS; SUBCUTANEOUS at 12:08

## 2022-08-24 RX ADMIN — DEXAMETHASONE SODIUM PHOSPHATE 4 MG: 4 INJECTION, SOLUTION INTRA-ARTICULAR; INTRALESIONAL; INTRAMUSCULAR; INTRAVENOUS; SOFT TISSUE at 12:08

## 2022-08-24 RX ADMIN — GABAPENTIN 800 MG: 400 CAPSULE ORAL at 02:08

## 2022-08-24 RX ADMIN — TACROLIMUS 2 MG: 1 CAPSULE ORAL at 08:08

## 2022-08-24 RX ADMIN — OXYCODONE HYDROCHLORIDE 10 MG: 5 TABLET ORAL at 05:08

## 2022-08-24 RX ADMIN — DEXAMETHASONE SODIUM PHOSPHATE 4 MG: 4 INJECTION, SOLUTION INTRA-ARTICULAR; INTRALESIONAL; INTRAMUSCULAR; INTRAVENOUS; SOFT TISSUE at 05:08

## 2022-08-24 RX ADMIN — IPRATROPIUM BROMIDE AND ALBUTEROL SULFATE 3 ML: 2.5; .5 SOLUTION RESPIRATORY (INHALATION) at 03:08

## 2022-08-24 RX ADMIN — IPRATROPIUM BROMIDE AND ALBUTEROL SULFATE 3 ML: 2.5; .5 SOLUTION RESPIRATORY (INHALATION) at 12:08

## 2022-08-24 RX ADMIN — IPRATROPIUM BROMIDE AND ALBUTEROL SULFATE 3 ML: 2.5; .5 SOLUTION RESPIRATORY (INHALATION) at 07:08

## 2022-08-24 RX ADMIN — HEPARIN SODIUM 5000 UNITS: 5000 INJECTION INTRAVENOUS; SUBCUTANEOUS at 01:08

## 2022-08-24 RX ADMIN — TACROLIMUS 1 MG: 1 CAPSULE ORAL at 05:08

## 2022-08-24 RX ADMIN — HEPARIN SODIUM 5000 UNITS: 5000 INJECTION INTRAVENOUS; SUBCUTANEOUS at 09:08

## 2022-08-24 RX ADMIN — HYDRALAZINE HYDROCHLORIDE 25 MG: 25 TABLET, FILM COATED ORAL at 04:08

## 2022-08-24 RX ADMIN — Medication 6 MG: at 12:08

## 2022-08-24 RX ADMIN — NIFEDIPINE 60 MG: 30 TABLET, FILM COATED, EXTENDED RELEASE ORAL at 08:08

## 2022-08-24 RX ADMIN — HYDROMORPHONE HYDROCHLORIDE 2 MG: 2 INJECTION, SOLUTION INTRAMUSCULAR; INTRAVENOUS; SUBCUTANEOUS at 10:08

## 2022-08-24 RX ADMIN — HYDROMORPHONE HYDROCHLORIDE 2 MG: 2 INJECTION, SOLUTION INTRAMUSCULAR; INTRAVENOUS; SUBCUTANEOUS at 05:08

## 2022-08-24 RX ADMIN — HYDROMORPHONE HYDROCHLORIDE 2 MG: 2 INJECTION, SOLUTION INTRAMUSCULAR; INTRAVENOUS; SUBCUTANEOUS at 08:08

## 2022-08-24 RX ADMIN — METOPROLOL SUCCINATE 200 MG: 50 TABLET, EXTENDED RELEASE ORAL at 08:08

## 2022-08-24 RX ADMIN — LISINOPRIL 40 MG: 20 TABLET ORAL at 08:08

## 2022-08-24 RX ADMIN — INSULIN ASPART 5 UNITS: 100 INJECTION, SOLUTION INTRAVENOUS; SUBCUTANEOUS at 08:08

## 2022-08-24 RX ADMIN — MUPIROCIN: 20 OINTMENT TOPICAL at 08:08

## 2022-08-24 RX ADMIN — METHOCARBAMOL TABLETS 750 MG: 750 TABLET, COATED ORAL at 08:08

## 2022-08-24 RX ADMIN — INSULIN ASPART 5 UNITS: 100 INJECTION, SOLUTION INTRAVENOUS; SUBCUTANEOUS at 05:08

## 2022-08-24 RX ADMIN — NIFEDIPINE 30 MG: 30 TABLET, FILM COATED, EXTENDED RELEASE ORAL at 04:08

## 2022-08-24 RX ADMIN — TRAZODONE HYDROCHLORIDE 50 MG: 50 TABLET ORAL at 08:08

## 2022-08-24 RX ADMIN — OXYCODONE HYDROCHLORIDE 10 MG: 5 TABLET ORAL at 08:08

## 2022-08-24 NOTE — PLAN OF CARE
Problem: Occupational Therapy  Goal: Occupational Therapy Goal  Description: Goals to be met by: 9/23/2022     Patient will increase functional independence with ADLs by performing:    LE Dressing with Set-up Assistance.  Toileting from toilet with Modified [place] for hygiene and clothing management.   Step transfer with Modified [place] with appropriate AD.  Toilet transfer to toilet with Modified [place] & appropriate AD.     Outcome: Ongoing, Progressing   Vick Gonzalez is a 66 y.o. male with a medical diagnosis of Myeloradiculopathy.  Performance deficits affecting function are weakness, impaired endurance, impaired self care skills, impaired functional mobility, gait instability, impaired balance, decreased upper extremity function, decreased lower extremity function, pain, impaired skin, edema, orthopedic precautions, impaired sensation. Pt found in bed, agreeable to therapy despite significant pain in neck.  Pt is progressing well towards goals. Continue OT services to address functional goals, progressing as able.

## 2022-08-24 NOTE — PT/OT/SLP PROGRESS
Physical Therapy Treatment    Patient Name:  Vick Gonzalez   MRN:  [id number]    Recommendations:     Discharge Recommendations:  home health PT   Discharge Equipment Recommendations: walker, rolling   Barriers to discharge: decreased mobility,strength and endurance    Assessment:     Vick Gonzalez is a 66 y.o. male admitted with a medical diagnosis of Myeloradiculopathy.  He presents with the following impairments/functional limitations:  weakness, impaired endurance, impaired functional mobility, gait instability, impaired balance, decreased lower extremity function, decreased ROM, impaired coordination, impaired skin, orthopedic precautions,pt with improving mobility and will benefit from a RW upon discharge and  services to address above functional limitations.    Rehab Prognosis: Good; patient would benefit from acute skilled PT services to address these deficits and reach maximum level of function.    Recent Surgery: Procedure(s) (LRB):  Procedure: ACDF 4-7 LOS: 4.0 Anesthesia: General Blood: Type & Screen Radiology: C-Arm Microscope: ------- SNS: EMG, SEP, MEP Brace: [place] Bed: Regular Bed, Shoulder Strap Headrest:------ Position: Supine Equipment: PeopleAdmin (N/A)  DECOMPRESSION AND FUSION, SPINE, CERVICAL, ANTERIOR APPROACH 2 Days Post-Op    Plan:     During this hospitalization, patient to be seen daily to address the identified rehab impairments via gait training, therapeutic activities, therapeutic exercises, neuromuscular re-education and progress toward the following goals:    · Plan of Care Expires:  09/22/22    Subjective     Chief Complaint: n/a  Patient/Family Comments/goals: pt may go home tomorrow.  Pain/Comfort:  · Pain Rating 1: 0/10      Objective:     Communicated with nsg prior to session.  Patient found supine with cervical collar, peripheral IV upon PT entry to room.     General Precautions: Standard, fall   Orthopedic Precautions:spinal precautions   Braces: Aspen collar  Respiratory  Status: Room air     Functional Mobility:  · Bed Mobility:     · Supine to Sit: modified independence  · Transfers:     · Sit to Stand:  modified independence and supervision with no AD  · Gait: amb ~140' w/o AD and S/SBA  · Balance: fair standing balance      AM-PAC 6 CLICK MOBILITY  Turning over in bed (including adjusting bedclothes, sheets and blankets)?: 4  Sitting down on and standing up from a chair with arms (e.g., wheelchair, bedside commode, etc.): 3  Moving from lying on back to sitting on the side of the bed?: 4  Moving to and from a bed to a chair (including a wheelchair)?: 3  Need to walk in hospital room?: 3  Climbing 3-5 steps with a railing?: 2  Basic Mobility Total Score: 19       Therapeutic Activities and Exercises: le seated ex's X 10-12 reps inc: ap,laq,hip flex.       Patient left sitting edge of bed with all lines intact and call button in reach..    GOALS: see general POC  Multidisciplinary Problems     Physical Therapy Goals        Problem: Physical Therapy    Goal Priority Disciplines Outcome Goal Variances Interventions   Physical Therapy Goal     PT, PT/OT Ongoing, Progressing     Description: Goals to be met by: 2022    Patient will increase functional independence with mobility by performin. Supine to sit with Modified [place]  2. Sit to supine with Modified [place]  3. Rolling to Left and Right with Modified [place].  4. Sit to stand transfer with Modified [place]  5. Gait  x 200 feet with Modified [place] using Rolling Walker.                      Time Tracking:     PT Received On: 22  PT Start Time: 1304     PT Stop Time: 1328  PT Total Time (min): 24 min     Billable Minutes: Gait Training 14 and Therapeutic Exercise 10    Treatment Type: Treatment  PT/PTA: PTA     PTA Visit Number: 1     2022

## 2022-08-24 NOTE — NURSING
Notified MD Jerry and SUREKHA Gilbert that patient reports that he takes an additional medication for BP that is not listed on medication administration record. Patient unable to provide this RN with name of medication.

## 2022-08-24 NOTE — PLAN OF CARE
The proper method of use, as well as anticipated side effects, of this aerosol treatment are discussed and demonstrated to the patient.  Pt on RA with documented sats. Lung expansion therapy. Will continue to monitor.

## 2022-08-24 NOTE — PLAN OF CARE
Pt on documented O2, no respiratory distress noted. Will continue to monitor.   Small volume nebulizer treatment with albuterol 0.5 ml, Atrovent 1 vial (500 mcg), and normal saline 2 ml.

## 2022-08-24 NOTE — PROGRESS NOTES
[facility] Medicine  Progress Note    Patient Name: Vick Gonzalez  MRN: [id number]  Patient Class: OP- Outpatient Recovery   Admission Date: 8/22/2022  Length of Stay: 0 days  Attending Physician: Titi Christian MD  Primary Care Provider: Emanuel Lopez MD        Subjective:     Principal Problem:Myeloradiculopathy        HPI:  66 y.o. male with a past medical history significant for DM II, HTN, HLD, liver transplant (2/2 end-stage liver disease hepatitis C and prior alcohol abuse), who saw NSGY for C4-C7 ACDF for management of myeloradiculopathy. Patient presented with signs and symptoms of left hand pain and myelopathy. MRI revealed severe canal stenosis C4-C7. Patient was admitted by NSGY for surgical intervention. [facility] has been consulted for medical management.       Overview/Hospital Course:  No notes on file    Interval History: No acute events. Pt has c/o mild neck pain and dysphagia. Started on decadron. Working well with PT/OT.    Review of Systems   Constitutional:  Negative for activity change, appetite change, chills, diaphoresis, fatigue, fever and unexpected weight change.   HENT:  Positive for trouble swallowing.    Respiratory:  Negative for cough, chest tightness, shortness of breath, wheezing and stridor.    Cardiovascular:  Negative for chest pain, palpitations and leg swelling.   Gastrointestinal:  Negative for abdominal pain, blood in stool, constipation, diarrhea, nausea and vomiting.   Endocrine: Negative for cold intolerance and heat intolerance.   Genitourinary:  Negative for difficulty urinating, dysuria, flank pain and frequency.   Musculoskeletal:  Positive for back pain and neck pain. Negative for arthralgias, joint swelling and myalgias.   Skin: Negative.    Neurological:  Negative for dizziness, weakness, light-headedness and headaches.   Objective:     Vital Signs (Most Recent):  Temp: 97.4 °F (36.3 °C) (08/24/22 1156)  Pulse: 83 (08/24/22 1249)  Resp: 19 (08/24/22  1312)  BP: (!) 178/86 (Maryjo, RN notified) (08/24/22 1156)  SpO2: 96 % (08/24/22 1249)   Vital Signs (24h Range):  Temp:  [97.2 °F (36.2 °C)-98.3 °F (36.8 °C)] 97.4 °F (36.3 °C)  Pulse:  [78-99] 83  Resp:  [17-20] 19  SpO2:  [94 %-97 %] 96 %  BP: (135-188)/(68-97) 178/86     Weight: 120.3 kg (265 lb 3.4 oz)  Body mass index is 34.05 kg/m².    Intake/Output Summary (Last 24 hours) at 8/24/2022 1425  Last data filed at 8/24/2022 0500  Gross per 24 hour   Intake 120 ml   Output 15 ml   Net 105 ml      Physical Exam  Constitutional:       General: He is not in acute distress.     Appearance: He is not ill-appearing or toxic-appearing.   HENT:      Head: Normocephalic and atraumatic.   Eyes:      Conjunctiva/sclera: Conjunctivae normal.      Pupils: Pupils are equal, round, and reactive to light.   Cardiovascular:      Rate and Rhythm: Normal rate and regular rhythm.      Heart sounds: Normal heart sounds.   Pulmonary:      Effort: Pulmonary effort is normal.      Breath sounds: Normal breath sounds.   Abdominal:      General: Bowel sounds are normal.      Palpations: Abdomen is soft.      Tenderness: There is no abdominal tenderness. There is no guarding.   Musculoskeletal:         General: Normal range of motion.      Cervical back: Normal range of motion and neck supple. Tenderness present.      Right lower leg: No edema.      Left lower leg: No edema.   Skin:     General: Skin is warm and dry.   Neurological:      Mental Status: He is alert and oriented to person, place, and time. Mental status is at baseline.       Significant Labs: All pertinent labs within the past 24 hours have been reviewed.    Significant Imaging: I have reviewed all pertinent imaging results/findings within the past 24 hours.      Assessment/Plan:      * Myeloradiculopathy  C4, C5, C6, C7 anterior cervical discectomy and fusion  Procedure: ACDF 4-7  See Op Note for complete details.   Further mgnt per NSGY    Diabetes mellitus type II,  uncontrolled  Patient's FSGs are uncontrolled due to hyperglycemia on current medication regimen.  Last A1c reviewed-   Lab Results   Component Value Date    HGBA1C 9.7 (H) 08/16/2022     Most recent fingerstick glucose reviewed-   Recent Labs   Lab 08/23/22  1510 08/23/22 2009 08/24/22  0514 08/24/22  1157   POCTGLUCOSE 246* 244* 281* 291*     Current correctional scale  Medium  Maintain anti-hyperglycemic dose as follows-   Antihyperglycemics (From admission, onward)            Start     Stop Route Frequency Ordered    08/24/22 1645  insulin aspart U-100 pen 5 Units         -- SubQ 3 times daily with meals 08/24/22 1420    08/24/22 1430  insulin detemir U-100 pen 20 Units         -- SubQ Nightly 08/24/22 1420    08/22/22 1340  insulin aspart U-100 pen 1-10 Units         -- SubQ Before meals & nightly PRN 08/22/22 1243        Hold Oral hypoglycemics while patient is in the hospital.  Consult DM educator   Uncontrolled  Start detemir 20u QHS and aspart 5u TID w/ meals  SSI w/ coverage    Essential hypertension  Resume home meds.   Uncontrolled  Increase CCB and add hydralazine      Thrombocytopenia  Appears chronic   Monitor while on heparin products   No mucocutaneous bleeding       Hypothyroidism  Resume synthroid         VTE Risk Mitigation (From admission, onward)         Ordered     heparin (porcine) injection 5,000 Units  Every 8 hours         08/22/22 1556     Place sequential compression device  Until discontinued         08/22/22 0557                Discharge Planning   JOJO:      Code Status: Prior   Is the patient medically ready for discharge?:     Reason for patient still in hospital (select all that apply): Patient trending condition and Treatment  Discharge Plan A: Home with family   Discharge Delays: None known at this time              Titi Tafoya MD  Department of Hospital Medicine   [facility] Surg

## 2022-08-24 NOTE — PT/OT/SLP PROGRESS
"Speech Language Pathology Treatment    Patient Name:  Vick Gonzalez   MRN:  [id number]  Admitting Diagnosis: Myeloradiculopathy    Recommendations:                Diet recommendations:  Mechanical soft, Liquid Diet Level: Thin   Aspiration Precautions: standard precautions, upright for meals, slow rate, small sips and bites, alternate liquids and solids during meals   General Precautions: Standard, fall  Communication strategies:  none    Subjective     Pt seen at bedside for direct dysphagia tx and diet follow up. Pt awake and alert.   Patient goals: "I feel a lot better today."     Pain/Comfort:  · Pain Rating 1: 4/10  · Location 1:  (throat pain)    Respiratory Status: room air     Objective:     Has the patient been evaluated by SLP for swallowing?   Yes  Keep patient NPO? No   Current Respiratory Status:    RA    Swallowing: Pt was seated upright in bed, he is moving around room with minimal issues.   Pt had consumed most of full liquid tray but agreeable to more PO. Pt observed taking whole meds in cluster followed by brief sip of water.   No issues with whole meds. Pt consumed pudding bites and diced pears with good mastication,adequate ap transfer and more timely swallow noted with consecutive sips of bites. Pt safe for diet upgrade to [facility] soft and thin liquids.     Assessment:     Vick Gonzalez is a 66 y.o. male admitted with Myeloradiculopathy  with an SLP diagnosis of ability to advance to [facility] soft diet textures, continue thin liquids.   Primary Md team made aware of diet recs and epic order updated.     Goals:   Multidisciplinary Problems     SLP Goals     Not on file          Multidisciplinary Problems (Resolved)        Problem: SLP    Goal Priority Disciplines Outcome   SLP Goal   (Resolved)     SLP Met   Description: Short Term Goals:  1. Pt will participate in swallow eval to determine safest diet level.  2. Pt will tolerate full liquid diet with no audible dysphagia signs.   3. Pt will tolerate " advanced trials with no audible dysphagia signs.                    Plan:       · Plan of Care expires:  09/21/22  · Plan of Care reviewed with:  patient   · SLP Follow-Up:  No       Discharge recommendations:  home health PT, home health OT   Barriers to Discharge:  none    Time Tracking:     SLP Treatment Date:   08/24/22  Speech Start Time:  0850  Speech Stop Time:  0906     Speech Total Time (min):  16 min    Billable Minutes: Treatment Swallowing Dysfunction 16    08/24/2022

## 2022-08-24 NOTE — PLAN OF CARE
Problem: Physical Therapy  Goal: Physical Therapy Goal  Description: Goals to be met by: 2022    Patient will increase functional independence with mobility by performin. Supine to sit with Modified [place]  2. Sit to supine with Modified [place]  3. Rolling to Left and Right with Modified [place].  4. Sit to stand transfer with Modified [place]  5. Gait  x 200 feet with Modified [place] using Rolling Walker.     Outcome: Ongoing, Progressing

## 2022-08-24 NOTE — NURSING
Patient AAOx4. C/o Pain to incision area. PRN dilaudid and oxy administered per orders. Anterior neck incision with dressing in place, CDI. [place] collar in place. Hemovac drain intact, output monitored. Ambulating with assist and walker. HOB elevated. Turns self. Tolerating full liquid diet. Urinal at bedside. Labs, vs and BG monitored. Instructed to call for assistance. Safety maintained. Will continue to monitor.

## 2022-08-24 NOTE — PLAN OF CARE
Pt appears much improved with deglutition. Rec: advancing diet to mech soft and thin liquids. Notified primary MD team of above recs.

## 2022-08-24 NOTE — PROGRESS NOTES
[facility] Surg  Neurosurgery  Progress Note    Subjective:       Interval History:  Mild neck pain.  Left arm pain less.  Left hand cramps less.  Still with swallowing trouble.  Walked ok with PT.     History of Present Illness:  This is a very pleasant 66 y.o. male who is referred with the above symptoms.    He is s/p L CTR with continued left arm pain.  EMG ->              1. B CTS, mild              2. B ulnar neuropathy at wrist, mod              3. Chronic C7 > C5, C6 radiculopathies  Denies LE clumsiness or B/B dysfunction    Post-Op Info:  Procedure(s) (LRB):  Procedure: ACDF 4-7 LOS: 4.0 Anesthesia: General Blood: Type & Screen Radiology: C-Arm Microscope: ------- SNS: EMG, SEP, MEP Brace: [place] Bed: Regular Bed, Shoulder Strap Headrest:------ Position: Supine Equipment: Endorse.me (N/A)  DECOMPRESSION AND FUSION, SPINE, CERVICAL, ANTERIOR APPROACH   2 Days Post-Op      Medications:  Continuous Infusions:  Scheduled Meds:   acetaminophen  1,000 mg Oral Q8H    albuterol-ipratropium  3 mL Nebulization Q4H WAKE    allopurinoL  100 mg Oral BID    atorvastatin  20 mg Oral Daily    bisacodyL  10 mg Rectal Daily    dexamethasone  4 mg Intravenous Q6H    gabapentin  800 mg Oral TID    heparin (porcine)  5,000 Units Subcutaneous Q8H    lactulose  15 g Oral Daily    levothyroxine  88 mcg Oral Before breakfast    lisinopriL  40 mg Oral Daily    methocarbamoL  750 mg Oral TID    metoprolol succinate  200 mg Oral Daily    mupirocin   Nasal BID    NIFEdipine  60 mg Oral Daily    sertraline  100 mg Oral Daily    tacrolimus  1 mg Oral Daily PM    tacrolimus  2 mg Oral Daily AM    traZODone  50 mg Oral QHS     PRN Meds:aluminum-magnesium hydroxide-simethicone, dextrose 10%, dextrose 10%, glucagon (human recombinant), glucose, glucose, hydrALAZINE, HYDROmorphone, insulin aspart U-100, labetalol, melatonin, ondansetron, oxyCODONE, oxyCODONE, prochlorperazine, sars-cov-2 (covid-19), senna-docusate 8.6-50 mg,  senna-docusate 8.6-50 mg, sodium chloride 0.9%, sodium chloride 0.9%     Review of Systems  Objective:     Weight: 120.3 kg (265 lb 3.4 oz)  Body mass index is 34.05 kg/m².  Vital Signs (Most Recent):  Temp: 97.9 °F (36.6 °C) (08/24/22 0824)  Pulse: 99 (08/24/22 0824)  Resp: 19 (08/24/22 0847)  BP: (!) 188/91 (TONY Sibley notified) (08/24/22 0824)  SpO2: 96 % (08/24/22 0824) Vital Signs (24h Range):  Temp:  [97.2 °F (36.2 °C)-98.3 °F (36.8 °C)] 97.9 °F (36.6 °C)  Pulse:  [83-99] 99  Resp:  [17-20] 19  SpO2:  [94 %-97 %] 96 %  BP: (135-188)/(68-97) 188/91                          Closed/Suction Drain 08/22/22 1123 Right;Anterior Neck Accordion 10 Fr. (Active)   Site Description Bleeding 08/23/22 0930   Dressing Type Gauze 08/23/22 0930   Dressing Status Dry;Intact;New drainage 08/23/22 0930   Dressing Intervention Integrity maintained 08/23/22 0930   Drainage Bloody 08/23/22 0930   Status To bulb suction 08/23/22 0930   Output (mL) 10 mL 08/24/22 0500       Neurosurgery Physical Exam     General: well developed, well nourished, no distress  Mental Status: Awake, Alert, Oriented X3.Thought content appropriate  GCS: Motor: 6/Verbal: 5/Eyes: 4 GCS Total: 15     Motor Strength:  Strength   Deltoids Triceps Biceps Wrist Extension Wrist Flexion Hand    Upper: R 5/5 4/5 4/5 5/5 5/5 4/5     L 5/5 4/5 5/5 5/5 5/5 4/5       HF KF KE DF PF EHL   Lower: R 5/5 5/5 5/5 5/5 5/5 5/5     L 5/5 5/5 5/5 5/5 5/5 5/5         Castro: present B/L  Clonus: absent B/L  Incision- CDI  Drain- 15          Significant Labs:  Recent Labs   Lab 08/22/22  1329   *      K 4.3      CO2 20*   BUN 14   CREATININE 1.1   CALCIUM 9.4     Recent Labs   Lab 08/22/22  1329   WBC 8.45   HGB 12.7*   HCT 37.9*   *     No results for input(s): LABPT, INR, APTT in the last 48 hours.  Microbiology Results (last 7 days)     ** No results found for the last 168 hours. **            Assessment/Plan:     Active Diagnoses:    Diagnosis  Date Noted POA    PRINCIPAL PROBLEM:  Myeloradiculopathy [G54.9] 08/22/2022 Yes    Diabetes mellitus type II, uncontrolled [E11.65] 10/04/2016 Yes    Essential hypertension [I10] 06/19/2015 Yes    Hypothyroidism [E03.9]  Yes    Thrombocytopenia [D69.6] 06/16/2010 Yes      Problems Resolved During this Admission:     A/P:  POD #2 C4-7 ACDF                --Neurologically stable          -q4h neuro checks  --All pertinent labs and diagnostics personally reviewed  --Imaging: Post op xrays show good hardware placement  --Drains: Continue  --Pain control: Tylenol 1000mg Q 8 hours, Oxycodone IR 5mg Q 4 hours PRN, Oxycodone IR 10mg Q 4 hours PRN, Robaxin 750mg TID, Dilaudid 2mg IV Q 3 hours PRN severe pain,   --DVT ppx: SCDs/SQH  --Activity: PT/OT, OOB. C-collar.    --Diet: Regular, [place]ock IV  --Bowel regimen: PRN suppository, senna PRN, lactulose daily  --Urinary: Voiding spontaneously  --Atelectasis ppx: Encourage IS hourly  --Decadron 4mg Q6 hrs for 48 hours for swallowing difficulty  --Consult ST for swallow eval.  Advanced diet today  --Appreciate  recs      Dispo: Home with PTOT possibly tomorrow     All of the above discussed and reviewed with Dr. Gino Gilbert, PA-C  Neurosurgery  [facility]

## 2022-08-24 NOTE — PLAN OF CARE
Sw met with pt to discuss d/c planning. Per therapy recommendation, Sw discussed with pt about discharging home with HH services. Pt denied HH services. Pt stated he does not have time for HH services. Pt stated he will be fine to discharge home without HH services. Pt requested a glucometer upon d/c. Sw will attempt to get pt a glucometer upon d/c. Sw encouraged pt to call with any questions or concerns. Sw will continue to follow pt for d/c planning.     Future Appointments   Date Time Provider Department Center   9/6/2022  8:45 AM [facility] OIC-US1 MASTER [facility] ULTR [facility]   9/7/2022  8:30 AM Paige Gilbert PA-C [facility] NEUROSU [facility]   10/4/2022  9:40 AM Titi Christian MD [facility] NEUROSU [facility]   10/24/2022  9:45 AM [facility], [facility] [place]         08/24/22 1353   Discharge Reassessment   Assessment Type Discharge Planning Reassessment   Did the patient's condition or plan change since previous assessment? No   Discharge Plan discussed with: Patient   Discharge Plan A Home with family   Discharge Plan B Home Health   DME Needed Upon Discharge  glucometer   Discharge Barriers Identified None   Why the patient remains in the hospital Requires continued medical care   Post-Acute Status   Post-Acute Authorization Home Health   Home Health Status Patient declined/refused   Coverage [employer] Man[employer] Medicare   Hospital Resources/Appts/Education Provided Appointments scheduled by Navigator/Coordinator   Discharge Delays None known at this time

## 2022-08-24 NOTE — SUBJECTIVE & OBJECTIVE
Interval History: No acute events. Pt has c/o mild neck pain and dysphagia. Started on decadron. Working well with PT/OT.    Review of Systems   Constitutional:  Negative for activity change, appetite change, chills, diaphoresis, fatigue, fever and unexpected weight change.   HENT:  Positive for trouble swallowing.    Respiratory:  Negative for cough, chest tightness, shortness of breath, wheezing and stridor.    Cardiovascular:  Negative for chest pain, palpitations and leg swelling.   Gastrointestinal:  Negative for abdominal pain, blood in stool, constipation, diarrhea, nausea and vomiting.   Endocrine: Negative for cold intolerance and heat intolerance.   Genitourinary:  Negative for difficulty urinating, dysuria, flank pain and frequency.   Musculoskeletal:  Positive for back pain and neck pain. Negative for arthralgias, joint swelling and myalgias.   Skin: Negative.    Neurological:  Negative for dizziness, weakness, light-headedness and headaches.   Objective:     Vital Signs (Most Recent):  Temp: 97.4 °F (36.3 °C) (08/24/22 1156)  Pulse: 83 (08/24/22 1249)  Resp: 19 (08/24/22 1312)  BP: (!) 178/86 (Maryjo, RN notified) (08/24/22 1156)  SpO2: 96 % (08/24/22 1249)   Vital Signs (24h Range):  Temp:  [97.2 °F (36.2 °C)-98.3 °F (36.8 °C)] 97.4 °F (36.3 °C)  Pulse:  [78-99] 83  Resp:  [17-20] 19  SpO2:  [94 %-97 %] 96 %  BP: (135-188)/(68-97) 178/86     Weight: 120.3 kg (265 lb 3.4 oz)  Body mass index is 34.05 kg/m².    Intake/Output Summary (Last 24 hours) at 8/24/2022 1425  Last data filed at 8/24/2022 0500  Gross per 24 hour   Intake 120 ml   Output 15 ml   Net 105 ml      Physical Exam  Constitutional:       General: He is not in acute distress.     Appearance: He is not ill-appearing or toxic-appearing.   HENT:      Head: Normocephalic and atraumatic.   Eyes:      Conjunctiva/sclera: Conjunctivae normal.      Pupils: Pupils are equal, round, and reactive to light.   Cardiovascular:      Rate and Rhythm:  Normal rate and regular rhythm.      Heart sounds: Normal heart sounds.   Pulmonary:      Effort: Pulmonary effort is normal.      Breath sounds: Normal breath sounds.   Abdominal:      General: Bowel sounds are normal.      Palpations: Abdomen is soft.      Tenderness: There is no abdominal tenderness. There is no guarding.   Musculoskeletal:         General: Normal range of motion.      Cervical back: Normal range of motion and neck supple. Tenderness present.      Right lower leg: No edema.      Left lower leg: No edema.   Skin:     General: Skin is warm and dry.   Neurological:      Mental Status: He is alert and oriented to person, place, and time. Mental status is at baseline.       Significant Labs: All pertinent labs within the past 24 hours have been reviewed.    Significant Imaging: I have reviewed all pertinent imaging results/findings within the past 24 hours.

## 2022-08-24 NOTE — PT/OT/SLP PROGRESS
Occupational Therapy   Treatment    Name: Vick Gonzalez  MRN: [id number]  Admitting Diagnosis:  Myeloradiculopathy  2 Days Post-Op    Recommendations:     Discharge Recommendations: home, home health PT, home health OT  Discharge Equipment Recommendations:  bath bench, walker, rolling  Barriers to discharge:  Other (Comment) (Increased level of assistance)    Assessment:     Vick Gonzalez is a 66 y.o. male with a medical diagnosis of Myeloradiculopathy.  Performance deficits affecting function are weakness, impaired endurance, impaired self care skills, impaired functional mobility, gait instability, impaired balance, decreased upper extremity function, decreased lower extremity function, pain, impaired skin, edema, orthopedic precautions, impaired sensation. Pt found in bed, agreeable to therapy despite significant pain in neck.  Pt is progressing well towards goals. Continue OT services to address functional goals, progressing as able.      Rehab Prognosis:  Good; patient would benefit from acute skilled OT services to address these deficits and reach maximum level of function.       Plan:     Patient to be seen 5 x/week to address the above listed problems via self-care/home management, therapeutic activities, therapeutic exercises  · Plan of Care Expires: 09/23/22  · Plan of Care Reviewed with: patient    Subjective     Pain/Comfort:  · Pain Rating 1: 10/10  · Location - Side 1: Bilateral  · Location - Orientation 1: generalized  · Location 1: cervical spine  · Pain Addressed 1: Reposition, Distraction, Pre-medicate for activity, Other (see comments) (provided pt with ice pack and instructed to aplly for 15-20 min then remove.  Pt verbalizes understanding.)  · Pain Rating Post-Intervention 1: 10/10    Objective:     Communicated with: RN prior to session.  Patient found HOB elevated with peripheral IV, cervical collar, hemovac upon OT entry to room.    General Precautions: Standard, fall   Orthopedic  Precautions:spinal precautions   Braces: Aspen collar  Respiratory Status: Room air     Occupational Performance:     Bed Mobility:    · Patient completed Rolling/Turning to Right with modified independence  · Patient completed Scooting/Bridging with modified independence  · Patient completed Supine to Sit with modified independence  · Patient completed Sit to Supine with modified independence     Functional Mobility/Transfers:  · Patient completed Sit <> Stand Transfer with modified independence  with  no assistive device   · Functional Mobility: Pt ambulated short distance in room with SBA/S w/o AD.      Activities of Daily Living:  · Grooming: modified independence standing at sink.  VCs for upright posture.   Lower Body Dressing: minimum assistance   Pt instructed on use of reacher and sock aid to assist with LB drsg [place]/safety while maintaining spinal precautions.  Pt reports he has a reacher at home and will make a sock aid when he gets home.         [facility] 6 Click ADL: 20    Treatment & Education:  Reviewed spinal precautions.     Patient left right sidelying with all lines intact, call button in reach, bed alarm on, RN notified and ice packEducation:      GOALS:   Multidisciplinary Problems     Occupational Therapy Goals        Problem: Occupational Therapy    Goal Priority Disciplines Outcome Interventions   Occupational Therapy Goal     OT, PT/OT Ongoing, Progressing    Description: Goals to be met by: 9/23/2022     Patient will increase functional independence with ADLs by performing:    LE Dressing with Set-up Assistance.  Toileting from toilet with Modified [place] for hygiene and clothing management.   Step transfer with Modified [place] with appropriate AD.  Toilet transfer to toilet with Modified [place] & appropriate AD.                      Time Tracking:     OT Date of Treatment: 08/24/22  OT Start Time: 1030  OT Stop Time: 1059  OT Total Time (min): 29 min    Billable  Minutes:Self Care/Home Management 29            8/24/2022

## 2022-08-24 NOTE — ASSESSMENT & PLAN NOTE
Patient's FSGs are uncontrolled due to hyperglycemia on current medication regimen.  Last A1c reviewed-   Lab Results   Component Value Date    HGBA1C 9.7 (H) 08/16/2022     Most recent fingerstick glucose reviewed-   Recent Labs   Lab 08/23/22  1510 08/23/22 2009 08/24/22  0514 08/24/22  1157   POCTGLUCOSE 246* 244* 281* 291*     Current correctional scale  Medium  Maintain anti-hyperglycemic dose as follows-   Antihyperglycemics (From admission, onward)            Start     Stop Route Frequency Ordered    08/24/22 1645  insulin aspart U-100 pen 5 Units         -- SubQ 3 times daily with meals 08/24/22 1420    08/24/22 1430  insulin detemir U-100 pen 20 Units         -- SubQ Nightly 08/24/22 1420    08/22/22 1340  insulin aspart U-100 pen 1-10 Units         -- SubQ Before meals & nightly PRN 08/22/22 1243        Hold Oral hypoglycemics while patient is in the hospital.  Consult DM educator   Uncontrolled  Start detemir 20u QHS and aspart 5u TID w/ meals  SSI w/ coverage

## 2022-08-25 DIAGNOSIS — G54.9 MYELORADICULOPATHY: ICD-10-CM

## 2022-08-25 DIAGNOSIS — Z98.1 S/P CERVICAL SPINAL FUSION: Primary | ICD-10-CM

## 2022-08-25 LAB
ANION GAP SERPL CALC-SCNC: 13 MMOL/L (ref 8–16)
BASOPHILS # BLD AUTO: 0 K/UL (ref 0–0.2)
BASOPHILS NFR BLD: 0 % (ref 0–1.9)
BUN SERPL-MCNC: 23 MG/DL (ref 8–23)
CALCIUM SERPL-MCNC: 9.7 MG/DL (ref 8.7–10.5)
CHLORIDE SERPL-SCNC: 99 MMOL/L (ref 95–110)
CO2 SERPL-SCNC: 25 MMOL/L (ref 23–29)
CREAT SERPL-MCNC: 1.1 MG/DL (ref 0.5–1.4)
DIFFERENTIAL METHOD: ABNORMAL
EOSINOPHIL # BLD AUTO: 0 K/UL (ref 0–0.5)
EOSINOPHIL NFR BLD: 0 % (ref 0–8)
ERYTHROCYTE [DISTWIDTH] IN BLOOD BY AUTOMATED COUNT: 13.5 % (ref 11.5–14.5)
EST. GFR  (NO RACE VARIABLE): >60 ML/MIN/1.73 M^2
GLUCOSE SERPL-MCNC: 382 MG/DL (ref 70–110)
HCT VFR BLD AUTO: 38.9 % (ref 40–54)
HGB BLD-MCNC: 12.7 G/DL (ref 14–18)
IMM GRANULOCYTES # BLD AUTO: 0.07 K/UL (ref 0–0.04)
IMM GRANULOCYTES NFR BLD AUTO: 0.6 % (ref 0–0.5)
LYMPHOCYTES # BLD AUTO: 1.3 K/UL (ref 1–4.8)
LYMPHOCYTES NFR BLD: 11.4 % (ref 18–48)
MAGNESIUM SERPL-MCNC: 2 MG/DL (ref 1.6–2.6)
MCH RBC QN AUTO: 29.3 PG (ref 27–31)
MCHC RBC AUTO-ENTMCNC: 32.6 G/DL (ref 32–36)
MCV RBC AUTO: 90 FL (ref 82–98)
MONOCYTES # BLD AUTO: 0.8 K/UL (ref 0.3–1)
MONOCYTES NFR BLD: 6.5 % (ref 4–15)
NEUTROPHILS # BLD AUTO: 9.5 K/UL (ref 1.8–7.7)
NEUTROPHILS NFR BLD: 81.5 % (ref 38–73)
NRBC BLD-RTO: 0 /100 WBC
PLATELET # BLD AUTO: 154 K/UL (ref 150–450)
PMV BLD AUTO: 13.6 FL (ref 9.2–12.9)
POCT GLUCOSE: 223 MG/DL (ref 70–110)
POCT GLUCOSE: 258 MG/DL (ref 70–110)
POCT GLUCOSE: 283 MG/DL (ref 70–110)
POCT GLUCOSE: 298 MG/DL (ref 70–110)
POCT GLUCOSE: 362 MG/DL (ref 70–110)
POTASSIUM SERPL-SCNC: 5.1 MMOL/L (ref 3.5–5.1)
RBC # BLD AUTO: 4.33 M/UL (ref 4.6–6.2)
SODIUM SERPL-SCNC: 137 MMOL/L (ref 136–145)
WBC # BLD AUTO: 11.7 K/UL (ref 3.9–12.7)

## 2022-08-25 PROCEDURE — 25000003 PHARM REV CODE 250: Performed by: INTERNAL MEDICINE

## 2022-08-25 PROCEDURE — 36415 COLL VENOUS BLD VENIPUNCTURE: CPT | Performed by: INTERNAL MEDICINE

## 2022-08-25 PROCEDURE — 94761 N-INVAS EAR/PLS OXIMETRY MLT: CPT

## 2022-08-25 PROCEDURE — 63600175 PHARM REV CODE 636 W HCPCS: Performed by: PHYSICIAN ASSISTANT

## 2022-08-25 PROCEDURE — 80048 BASIC METABOLIC PNL TOTAL CA: CPT | Performed by: INTERNAL MEDICINE

## 2022-08-25 PROCEDURE — 25000242 PHARM REV CODE 250 ALT 637 W/ HCPCS: Performed by: PHYSICIAN ASSISTANT

## 2022-08-25 PROCEDURE — 83735 ASSAY OF MAGNESIUM: CPT | Performed by: INTERNAL MEDICINE

## 2022-08-25 PROCEDURE — 97530 THERAPEUTIC ACTIVITIES: CPT | Mod: CQ

## 2022-08-25 PROCEDURE — 94799 UNLISTED PULMONARY SVC/PX: CPT

## 2022-08-25 PROCEDURE — 97116 GAIT TRAINING THERAPY: CPT | Mod: CQ

## 2022-08-25 PROCEDURE — 99900035 HC TECH TIME PER 15 MIN (STAT)

## 2022-08-25 PROCEDURE — 94640 AIRWAY INHALATION TREATMENT: CPT

## 2022-08-25 PROCEDURE — 25000003 PHARM REV CODE 250: Performed by: PHYSICIAN ASSISTANT

## 2022-08-25 PROCEDURE — 85025 COMPLETE CBC W/AUTO DIFF WBC: CPT | Performed by: INTERNAL MEDICINE

## 2022-08-25 PROCEDURE — 25000003 PHARM REV CODE 250: Performed by: NURSE PRACTITIONER

## 2022-08-25 PROCEDURE — 97530 THERAPEUTIC ACTIVITIES: CPT | Mod: CO

## 2022-08-25 RX ORDER — INSULIN ASPART 100 [IU]/ML
10 INJECTION, SOLUTION INTRAVENOUS; SUBCUTANEOUS
Status: DISCONTINUED | OUTPATIENT
Start: 2022-08-25 | End: 2022-08-26 | Stop reason: HOSPADM

## 2022-08-25 RX ADMIN — METHOCARBAMOL TABLETS 750 MG: 750 TABLET, COATED ORAL at 03:08

## 2022-08-25 RX ADMIN — INSULIN ASPART 5 UNITS: 100 INJECTION, SOLUTION INTRAVENOUS; SUBCUTANEOUS at 07:08

## 2022-08-25 RX ADMIN — IPRATROPIUM BROMIDE AND ALBUTEROL SULFATE 3 ML: 2.5; .5 SOLUTION RESPIRATORY (INHALATION) at 04:08

## 2022-08-25 RX ADMIN — GABAPENTIN 800 MG: 400 CAPSULE ORAL at 03:08

## 2022-08-25 RX ADMIN — MUPIROCIN: 20 OINTMENT TOPICAL at 09:08

## 2022-08-25 RX ADMIN — OXYCODONE HYDROCHLORIDE 5 MG: 5 TABLET ORAL at 12:08

## 2022-08-25 RX ADMIN — OXYCODONE HYDROCHLORIDE 10 MG: 5 TABLET ORAL at 05:08

## 2022-08-25 RX ADMIN — DEXAMETHASONE SODIUM PHOSPHATE 4 MG: 4 INJECTION, SOLUTION INTRA-ARTICULAR; INTRALESIONAL; INTRAMUSCULAR; INTRAVENOUS; SOFT TISSUE at 12:08

## 2022-08-25 RX ADMIN — ATORVASTATIN CALCIUM 20 MG: 20 TABLET, FILM COATED ORAL at 09:08

## 2022-08-25 RX ADMIN — DEXAMETHASONE SODIUM PHOSPHATE 4 MG: 4 INJECTION, SOLUTION INTRA-ARTICULAR; INTRALESIONAL; INTRAMUSCULAR; INTRAVENOUS; SOFT TISSUE at 05:08

## 2022-08-25 RX ADMIN — ALLOPURINOL 100 MG: 100 TABLET ORAL at 09:08

## 2022-08-25 RX ADMIN — INSULIN ASPART 10 UNITS: 100 INJECTION, SOLUTION INTRAVENOUS; SUBCUTANEOUS at 12:08

## 2022-08-25 RX ADMIN — HEPARIN SODIUM 5000 UNITS: 5000 INJECTION INTRAVENOUS; SUBCUTANEOUS at 05:08

## 2022-08-25 RX ADMIN — IPRATROPIUM BROMIDE AND ALBUTEROL SULFATE 3 ML: 2.5; .5 SOLUTION RESPIRATORY (INHALATION) at 01:08

## 2022-08-25 RX ADMIN — Medication 6 MG: at 09:08

## 2022-08-25 RX ADMIN — INSULIN ASPART 3 UNITS: 100 INJECTION, SOLUTION INTRAVENOUS; SUBCUTANEOUS at 09:08

## 2022-08-25 RX ADMIN — HYDROMORPHONE HYDROCHLORIDE 2 MG: 2 INJECTION, SOLUTION INTRAMUSCULAR; INTRAVENOUS; SUBCUTANEOUS at 09:08

## 2022-08-25 RX ADMIN — INSULIN ASPART 6 UNITS: 100 INJECTION, SOLUTION INTRAVENOUS; SUBCUTANEOUS at 06:08

## 2022-08-25 RX ADMIN — METHOCARBAMOL TABLETS 750 MG: 750 TABLET, COATED ORAL at 09:08

## 2022-08-25 RX ADMIN — HYDROMORPHONE HYDROCHLORIDE 2 MG: 2 INJECTION, SOLUTION INTRAMUSCULAR; INTRAVENOUS; SUBCUTANEOUS at 11:08

## 2022-08-25 RX ADMIN — TRAZODONE HYDROCHLORIDE 50 MG: 50 TABLET ORAL at 09:08

## 2022-08-25 RX ADMIN — IPRATROPIUM BROMIDE AND ALBUTEROL SULFATE 3 ML: 2.5; .5 SOLUTION RESPIRATORY (INHALATION) at 07:08

## 2022-08-25 RX ADMIN — INSULIN ASPART 10 UNITS: 100 INJECTION, SOLUTION INTRAVENOUS; SUBCUTANEOUS at 05:08

## 2022-08-25 RX ADMIN — GABAPENTIN 800 MG: 400 CAPSULE ORAL at 09:08

## 2022-08-25 RX ADMIN — TACROLIMUS 2 MG: 1 CAPSULE ORAL at 09:08

## 2022-08-25 RX ADMIN — SERTRALINE HYDROCHLORIDE 100 MG: 100 TABLET ORAL at 09:08

## 2022-08-25 RX ADMIN — LEVOTHYROXINE SODIUM 88 MCG: 88 TABLET ORAL at 05:08

## 2022-08-25 RX ADMIN — LISINOPRIL 40 MG: 20 TABLET ORAL at 09:08

## 2022-08-25 RX ADMIN — NIFEDIPINE 90 MG: 30 TABLET, FILM COATED, EXTENDED RELEASE ORAL at 09:08

## 2022-08-25 RX ADMIN — HEPARIN SODIUM 5000 UNITS: 5000 INJECTION INTRAVENOUS; SUBCUTANEOUS at 02:08

## 2022-08-25 RX ADMIN — HEPARIN SODIUM 5000 UNITS: 5000 INJECTION INTRAVENOUS; SUBCUTANEOUS at 09:08

## 2022-08-25 RX ADMIN — METOPROLOL SUCCINATE 200 MG: 50 TABLET, EXTENDED RELEASE ORAL at 09:08

## 2022-08-25 RX ADMIN — HYDROMORPHONE HYDROCHLORIDE 2 MG: 2 INJECTION, SOLUTION INTRAMUSCULAR; INTRAVENOUS; SUBCUTANEOUS at 05:08

## 2022-08-25 RX ADMIN — HYDRALAZINE HYDROCHLORIDE 25 MG: 25 TABLET, FILM COATED ORAL at 09:08

## 2022-08-25 RX ADMIN — TACROLIMUS 1 MG: 1 CAPSULE ORAL at 05:08

## 2022-08-25 RX ADMIN — OXYCODONE HYDROCHLORIDE 5 MG: 5 TABLET ORAL at 09:08

## 2022-08-25 RX ADMIN — INSULIN ASPART 10 UNITS: 100 INJECTION, SOLUTION INTRAVENOUS; SUBCUTANEOUS at 07:08

## 2022-08-25 NOTE — ASSESSMENT & PLAN NOTE
Patient's FSGs are uncontrolled due to hyperglycemia on current medication regimen.  Last A1c reviewed-   Lab Results   Component Value Date    HGBA1C 9.7 (H) 08/16/2022     Most recent fingerstick glucose reviewed-   Recent Labs   Lab 08/24/22 2021 08/25/22  0011 08/25/22  0439 08/25/22  1134   POCTGLUCOSE 357* 298* 362* 223*     Current correctional scale  Medium  Maintain anti-hyperglycemic dose as follows-   Antihyperglycemics (From admission, onward)            Start     Stop Route Frequency Ordered    08/25/22 1130  insulin aspart U-100 pen 10 Units         -- SubQ 3 times daily with meals 08/25/22 0924    08/25/22 0930  insulin detemir U-100 pen 20 Units         -- SubQ 2 times daily 08/25/22 0924    08/22/22 1340  insulin aspart U-100 pen 1-10 Units         -- SubQ Before meals & nightly PRN 08/22/22 1243        Hold Oral hypoglycemics while patient is in the hospital.  Consult DM educator   Uncontrolled  Increase detemir to 20u BID and aspart 10u TID w/ meals  SSI w/ coverage

## 2022-08-25 NOTE — NURSING
Notified MD Lottie, of . Patient has scheduled 10 units of aspart with meals plus an additional 4 units of sliding scale aspart. Instructed by MD, to administer scheduled 10 units of aspart. Cindy MontgomeryD, notified.

## 2022-08-25 NOTE — PT/OT/SLP PROGRESS
Occupational Therapy   Treatment    Name: Vick Gonzalez  MRN: [id number]  Admitting Diagnosis:  Myeloradiculopathy  3 Days Post-Op    Recommendations:     Discharge Recommendations: home, home health PT  Discharge Equipment Recommendations:  walker, rolling  Barriers to discharge:  Other (Comment) (Increased level of assistance)    Assessment:     Vick Gonzalez is a 66 y.o. male with a medical diagnosis of Myeloradiculopathy.  Performance deficits affecting function are weakness, impaired endurance, impaired sensation, impaired self care skills, decreased lower extremity function, impaired functional mobility, gait instability, impaired balance, pain, decreased ROM, impaired skin, edema, orthopedic precautions. Pt found in bed, agreeable to therapy.  Pt reports continued tingling in L hand.  He reports pain improving.  Pt tolerated tx well and is progressing towards goals.  Continue OT services to address functional goals, progressing as able.      Rehab Prognosis:  Good; patient would benefit from acute skilled OT services to address these deficits and reach maximum level of function.       Plan:     Patient to be seen 5 x/week to address the above listed problems via self-care/home management, therapeutic activities, therapeutic exercises  · Plan of Care Expires: 09/23/22  · Plan of Care Reviewed with: patient    Subjective     Pain/Comfort:  · Pain Rating 1:  (cervical spine-did not rate however improved from yesterday.)    Objective:     Communicated with: RN prior to session.  Patient found right sidelying with cervical collar, peripheral IV upon OT entry to room.    General Precautions: Standard, fall   Orthopedic Precautions:spinal precautions   Braces: Aspen collar  Respiratory Status: Room air     Occupational Performance:     Bed Mobility:    · Patient completed R SL to sit with modified independence, increased effort  · Patient completed Sit to R SL with modified independence     Functional  Mobility/Transfers:  · Patient completed Sit <> Stand Transfer with modified independence  with  no assistive device   Functional Mobility: Pt ambulated increased distances, in preparation for ambulatory level ADL/IADL, with Supervision using RW.     Activities of Daily Living:  · Declined      [facility] 6 Click ADL: 20      Patient left right sidelying with all lines intact, call button in reach, bed alarm on and RN notifiedEducation:      GOALS:   Multidisciplinary Problems     Occupational Therapy Goals        Problem: Occupational Therapy    Goal Priority Disciplines Outcome Interventions   Occupational Therapy Goal     OT, PT/OT Ongoing, Progressing    Description: Goals to be met by: 9/23/2022     Patient will increase functional independence with ADLs by performing:    LE Dressing with Set-up Assistance.  Toileting from toilet with Modified [place] for hygiene and clothing management.   Step transfer with Modified [place] with appropriate AD.  Toilet transfer to toilet with Modified [place] & appropriate AD.                      Time Tracking:     OT Date of Treatment: 08/25/22  OT Start Time: 1433  OT Stop Time: 1443  OT Total Time (min): 10 min    Billable Minutes:Therapeutic Activity 10            8/25/2022

## 2022-08-25 NOTE — PLAN OF CARE
Problem: Physical Therapy  Goal: Physical Therapy Goal  Description: Goals to be met by: 2022    Patient will increase functional independence with mobility by performin. Supine to sit with Modified [place]  2. Sit to supine with Modified [place]  3. Rolling to Left and Right with Modified [place].  4. Sit to stand transfer with Modified [place]  5. Gait  x 200 feet with Modified [place] using Rolling Walker.     Outcome: Ongoing, Progressing

## 2022-08-25 NOTE — PLAN OF CARE
Sw met with pt to discuss d/c planning. Sw discussed with pt about the possibility of pt discharging home with HH services. Pt refused HH services upon d/c. Pt requested a rolling walker and glucometer upon d/c. Sw will attempt to get pt a rolling walker and glucometer upon d/c. Sw encouraged pt to call with any questions or concerns. Sw will continue to follow pt for d/c planning.     Future Appointments   Date Time Provider Department Center   9/6/2022  8:45 AM [facility] OIC-US1 MASTER [facility] ULTR [facility]   9/7/2022  8:30 AM Paige Gilbert PA-C [facility] NEUROSU [facility]   10/4/2022  9:40 AM Titi Christian MD [facility] NE[facility]U [facility]   10/24/2022  9:45 AM [facility], [facility] [place]         08/25/22 1334   Discharge Reassessment   Assessment Type Discharge Planning Reassessment   Did the patient's condition or plan change since previous assessment? No   Discharge Plan discussed with: Patient   Discharge Plan A Home with family   DME Needed Upon Discharge  walker, rolling;glucometer   Discharge Barriers Identified None   Why the patient remains in the hospital Requires continued medical care   Post-Acute Status   Post-Acute Authorization Home Health   Home Health Status Patient declined/refused   Coverage [employer]   Hospital Resources/Appts/Education Provided Appointments scheduled by Navigator/Coordinator   Discharge Delays None known at this time

## 2022-08-25 NOTE — SUBJECTIVE & OBJECTIVE
Interval History: No acute events. Pt states neck pain and dysphagia improving. Completed course of decadron.     Review of Systems   Constitutional:  Negative for activity change, appetite change, chills, diaphoresis, fatigue, fever and unexpected weight change.   HENT:  Positive for trouble swallowing.    Respiratory:  Negative for cough, chest tightness, shortness of breath, wheezing and stridor.    Cardiovascular:  Negative for chest pain, palpitations and leg swelling.   Gastrointestinal:  Negative for abdominal pain, blood in stool, constipation, diarrhea, nausea and vomiting.   Endocrine: Negative for cold intolerance and heat intolerance.   Genitourinary:  Negative for difficulty urinating, dysuria, flank pain and frequency.   Musculoskeletal:  Positive for back pain and neck pain. Negative for arthralgias, joint swelling and myalgias.   Skin: Negative.    Neurological:  Negative for dizziness, weakness, light-headedness and headaches.   Objective:     Vital Signs (Most Recent):  Temp: 98 °F (36.7 °C) (08/25/22 1135)  Pulse: 82 (08/25/22 1300)  Resp: 20 (08/25/22 1300)  BP: (!) 155/93 (08/25/22 1135)  SpO2: 96 % (08/25/22 1300)   Vital Signs (24h Range):  Temp:  [97.4 °F (36.3 °C)-98 °F (36.7 °C)] 98 °F (36.7 °C)  Pulse:  [82-99] 82  Resp:  [16-21] 20  SpO2:  [95 %-99 %] 96 %  BP: (139-185)/(69-96) 155/93     Weight: 118.2 kg (260 lb 9.3 oz)  Body mass index is 33.46 kg/m².    Intake/Output Summary (Last 24 hours) at 8/25/2022 1402  Last data filed at 8/25/2022 1202  Gross per 24 hour   Intake 360 ml   Output 0 ml   Net 360 ml        Physical Exam  Constitutional:       General: He is not in acute distress.     Appearance: He is not ill-appearing or toxic-appearing.   HENT:      Head: Normocephalic and atraumatic.   Eyes:      Conjunctiva/sclera: Conjunctivae normal.      Pupils: Pupils are equal, round, and reactive to light.   Cardiovascular:      Rate and Rhythm: Normal rate and regular rhythm.      Heart  sounds: Normal heart sounds.   Pulmonary:      Effort: Pulmonary effort is normal.      Breath sounds: Normal breath sounds.   Abdominal:      General: Bowel sounds are normal.      Palpations: Abdomen is soft.      Tenderness: There is no abdominal tenderness. There is no guarding.   Musculoskeletal:         General: Normal range of motion.      Cervical back: Normal range of motion and neck supple. Tenderness present.      Right lower leg: No edema.      Left lower leg: No edema.   Skin:     General: Skin is warm and dry.   Neurological:      Mental Status: He is alert and oriented to person, place, and time. Mental status is at baseline.       Significant Labs: All pertinent labs within the past 24 hours have been reviewed.    Significant Imaging: I have reviewed all pertinent imaging results/findings within the past 24 hours.

## 2022-08-25 NOTE — PT/OT/SLP PROGRESS
Physical Therapy Treatment    Patient Name:  Vick Gonzalez   MRN:  [id number]    Recommendations:     Discharge Recommendations:  home health PT   Discharge Equipment Recommendations: walker, rolling   Barriers to discharge: decreased mobility,strength and endurance    Assessment:     Vick Gonzalez is a 66 y.o. male admitted with a medical diagnosis of Myeloradiculopathy.  He presents with the following impairments/functional limitations:  weakness, impaired endurance, impaired functional mobility, gait instability, impaired balance, decreased lower extremity function, decreased ROM, impaired coordination, impaired skin, orthopedic precautions,pt with improving status and will benefit from  services upon discharge.    Rehab Prognosis: Good; patient would benefit from acute skilled PT services to address these deficits and reach maximum level of function.    Recent Surgery: Procedure(s) (LRB):  Procedure: ACDF 4-7 LOS: 4.0 Anesthesia: General Blood: Type & Screen Radiology: C-Arm Microscope: ------- SNS: EMG, SEP, MEP Brace: [place] Bed: Regular Bed, Shoulder Strap Headrest:------ Position: Supine Equipment: "[employer] (N/A)  DECOMPRESSION AND FUSION, SPINE, CERVICAL, ANTERIOR APPROACH 3 Days Post-Op    Plan:     During this hospitalization, patient to be seen daily to address the identified rehab impairments via gait training, therapeutic activities, therapeutic exercises, neuromuscular re-education and progress toward the following goals:    · Plan of Care Expires:  09/22/22    Subjective     Chief Complaint: n/a  Patient/Family Comments/goals: pt is going home tomorrow.  Pain/Comfort:  · Pain Rating 1: 0/10      Objective:     Communicated with nsg prior to session.  Patient found up in chair with cervical collar, peripheral IV upon PT entry to room.     General Precautions: Standard, fall   Orthopedic Precautions:spinal precautions   Braces: Aspen collar  Respiratory Status: Room air     Functional  Mobility:  · Transfers:     · Sit to Stand:  supervision with rolling walker  · Gait: amb ~115' X 2 with RW and S X 1 standing rest break  · Balance: fair standing balance with RW      AM-PAC 6 CLICK MOBILITY  Turning over in bed (including adjusting bedclothes, sheets and blankets)?: 4  Sitting down on and standing up from a chair with arms (e.g., wheelchair, bedside commode, etc.): 3  Moving from lying on back to sitting on the side of the bed?: 4  Moving to and from a bed to a chair (including a wheelchair)?: 3  Need to walk in hospital room?: 3  Climbing 3-5 steps with a railing?: 2  Basic Mobility Total Score: 19       Therapeutic Activities and Exercises: pt stood on balcony ~7 mins inside RW with S.       Patient left up in chair with all lines intact and call button in reach..    GOALS:   Multidisciplinary Problems     Physical Therapy Goals        Problem: Physical Therapy    Goal Priority Disciplines Outcome Goal Variances Interventions   Physical Therapy Goal     PT, PT/OT Ongoing, Progressing     Description: Goals to be met by: 2022    Patient will increase functional independence with mobility by performin. Supine to sit with Modified [place]  2. Sit to supine with Modified [place]  3. Rolling to Left and Right with Modified [place].  4. Sit to stand transfer with Modified [place]  5. Gait  x 200 feet with Modified [place] using Rolling Walker.                      Time Tracking:     PT Received On: 22  PT Start Time: 824     PT Stop Time: 848  PT Total Time (min): 24 min     Billable Minutes: Gait Training 15 and Therapeutic Activity 9    Treatment Type: Treatment  PT/PTA: PTA     PTA Visit Number: 2     2022

## 2022-08-25 NOTE — PLAN OF CARE
AOX4. VS stable. Safety maintained. Meds given per MAR. Pain treated with PRN meds. Aspen collar in place. Dressing to right anterior neck dry and intact with small amount of sanguinous drainage. Blood glucose monitored. Neuro checks Q 4. Resting quietly. SR up X 2. Call light in reach. Bed locked in lowest position. Will continue to monitor.       Problem: Adult Inpatient Plan of Care  Goal: Plan of Care Review  Outcome: Ongoing, Progressing  Goal: Patient-Specific Goal (Individualized)  Outcome: Ongoing, Progressing     Problem: Diabetes Comorbidity  Goal: Blood Glucose Level Within Targeted Range  Outcome: Ongoing, Progressing     Problem: Impaired Wound Healing  Goal: Optimal Wound Healing  Outcome: Ongoing, Progressing     Problem: Infection  Goal: Absence of Infection Signs and Symptoms  Outcome: Ongoing, Progressing     Problem: Pain Acute  Goal: Acceptable Pain Control and Functional Ability  Outcome: Ongoing, Progressing

## 2022-08-25 NOTE — PROGRESS NOTES
[facility]  Neurosurgery  Progress Note    Subjective:     Interval History: Left arm and hand improved some.  Swallowing better.  DM and HTN tx adjusted yesterday.  No other new complaints.    History of Present Illness:  This is a very pleasant 66 y.o. male who is referred with the above symptoms.    He is s/p L CTR with continued left arm pain.  EMG ->              1. B CTS, mild              2. B ulnar neuropathy at wrist, mod              3. Chronic C7 > C5, C6 radiculopathies  Denies LE clumsiness or B/B dysfunction       Post-Op Info:  Procedure(s) (LRB):  Procedure: ACDF 4-7 LOS: 4.0 Anesthesia: General Blood: Type & Screen Radiology: C-Arm Microscope: ------- SNS: EMG, SEP, MEP Brace: [place] Bed: Regular Bed, Shoulder Strap Headrest:------ Position: Supine Equipment: [employer] (N/A)  DECOMPRESSION AND FUSION, SPINE, CERVICAL, ANTERIOR APPROACH   3 Days Post-Op      Medications:  Continuous Infusions:  Scheduled Meds:   acetaminophen  1,000 mg Oral Q8H    albuterol-ipratropium  3 mL Nebulization Q4H WAKE    allopurinoL  100 mg Oral BID    atorvastatin  20 mg Oral Daily    bisacodyL  10 mg Rectal Daily    gabapentin  800 mg Oral TID    heparin (porcine)  5,000 Units Subcutaneous Q8H    hydrALAZINE  25 mg Oral Q12H    insulin aspart U-100  5 Units Subcutaneous TIDWM    insulin detemir U-100  20 Units Subcutaneous QHS    lactulose  15 g Oral Daily    levothyroxine  88 mcg Oral Before breakfast    lisinopriL  40 mg Oral Daily    methocarbamoL  750 mg Oral TID    metoprolol succinate  200 mg Oral Daily    mupirocin   Nasal BID    NIFEdipine  90 mg Oral Daily    sertraline  100 mg Oral Daily    tacrolimus  1 mg Oral Daily PM    tacrolimus  2 mg Oral Daily AM    traZODone  50 mg Oral QHS     PRN Meds:aluminum-magnesium hydroxide-simethicone, dextrose 10%, dextrose 10%, glucagon (human recombinant), glucose, glucose, hydrALAZINE, HYDROmorphone, insulin aspart U-100, labetalol, melatonin,  ondansetron, oxyCODONE, oxyCODONE, prochlorperazine, sars-cov-2 (covid-19), senna-docusate 8.6-50 mg, senna-docusate 8.6-50 mg, sodium chloride 0.9%, sodium chloride 0.9%     Review of Systems  Objective:     Weight: 118.2 kg (260 lb 9.3 oz)  Body mass index is 33.46 kg/m².  Vital Signs (Most Recent):  Temp: 98 °F (36.7 °C) (08/25/22 0756)  Pulse: 92 (08/25/22 0756)  Resp: 18 (08/25/22 0756)  BP: (!) 144/69 (08/25/22 0756)  SpO2: 97 % (08/25/22 0756) Vital Signs (24h Range):  Temp:  [97.4 °F (36.3 °C)-98 °F (36.7 °C)] 98 °F (36.7 °C)  Pulse:  [78-99] 92  Resp:  [16-21] 18  SpO2:  [95 %-99 %] 97 %  BP: (139-185)/(69-96) 144/69                          Neurosurgery Physical Exam     General: well developed, well nourished, no distress  Mental Status: Awake, Alert, Oriented X3.Thought content appropriate  GCS: Motor: 6/Verbal: 5/Eyes: 4 GCS Total: 15     Motor Strength:  Strength   Deltoids Triceps Biceps Wrist Extension Wrist Flexion Hand    Upper: R 5/5 4/5 4/5 5/5 5/5 4/5     L 5/5 4/5 5/5 5/5 5/5 4/5       HF KF KE DF PF EHL   Lower: R 5/5 5/5 5/5 5/5 5/5 5/5     L 5/5 5/5 5/5 5/5 5/5 5/5         Castro: present B/L  Clonus: absent B/L  Incision- CDI      Significant Labs:  Recent Labs   Lab 08/25/22  0534   *      K 5.1   CL 99   CO2 25   BUN 23   CREATININE 1.1   CALCIUM 9.7   MG 2.0     Recent Labs   Lab 08/25/22  0534   WBC 11.70   HGB 12.7*   HCT 38.9*        No results for input(s): LABPT, INR, APTT in the last 48 hours.  Microbiology Results (last 7 days)     ** No results found for the last 168 hours. **            Assessment/Plan:     Active Diagnoses:    Diagnosis Date Noted POA    PRINCIPAL PROBLEM:  Myeloradiculopathy [G54.9] 08/22/2022 Yes    Diabetes mellitus type II, uncontrolled [E11.65] 10/04/2016 Yes    Essential hypertension [I10] 06/19/2015 Yes    Hypothyroidism [E03.9]  Yes    Thrombocytopenia [D69.6] 06/16/2010 Yes      Problems Resolved During this Admission:      A/P:  POD #3 C4-7 ACDF                --Neurologically stable          -q4h neuro checks  --All pertinent labs and diagnostics personally reviewed  --Imaging: Post op xrays show good hardware placement  --Drains: Continue  --Pain control: Tylenol 1000mg Q 8 hours, Oxycodone IR 5mg Q 4 hours PRN, Oxycodone IR 10mg Q 4 hours PRN, Robaxin 750mg TID, Dilaudid 2mg IV Q 3 hours PRN severe pain,   --DVT ppx: SCDs/SQH  --Activity: PT/OT, OOB. C-collar.    --Diet: Regular, [place] IV  --Bowel regimen: PRN suppository, senna PRN, lactulose daily  --Urinary: Voiding spontaneously  --Atelectasis ppx: Encourage IS hourly  --Decadron 4mg Q6 hrs for 48 hours completed yesterday  --Consult ST for swallow eval.  Slowly improving  --Appreciate  recs.  DM and HTN treatment adjusted yesterday      Dispo: Home with HHPTOT and DME Friday     All of the above discussed and reviewed with Dr. Gino Gilbert, PA-C  Neurosurgery  [facility] S[facility]

## 2022-08-25 NOTE — PLAN OF CARE
Problem: Occupational Therapy  Goal: Occupational Therapy Goal  Description: Goals to be met by: 9/23/2022     Patient will increase functional independence with ADLs by performing:    LE Dressing with Set-up Assistance.  Toileting from toilet with Modified [place] for hygiene and clothing management.   Step transfer with Modified [place] with appropriate AD.  Toilet transfer to toilet with Modified [place] & appropriate AD.     Outcome: Ongoing, Progressing   Vick Gonzalez is a 66 y.o. male with a medical diagnosis of Myeloradiculopathy.  Performance deficits affecting function are weakness, impaired endurance, impaired sensation, impaired self care skills, decreased lower extremity function, impaired functional mobility, gait instability, impaired balance, pain, decreased ROM, impaired skin, edema, orthopedic precautions. Pt found in bed, agreeable to therapy.  Pt reports continued tingling in L hand.  He reports pain improving.  Pt tolerated tx well and is progressing towards goals.  Continue OT services to address functional goals, progressing as able.

## 2022-08-25 NOTE — PROGRESS NOTES
[facility] Medicine  Progress Note    Patient Name: Vick Gonzalez  MRN: [id number]  Patient Class: OP- Outpatient Recovery   Admission Date: 8/22/2022  Length of Stay: 0 days  Attending Physician: Titi Christian MD  Primary Care Provider: Emanuel Lopez MD        Subjective:     Principal Problem:Myeloradiculopathy        HPI:  66 y.o. male with a past medical history significant for DM II, HTN, HLD, liver transplant (2/2 end-stage liver disease hepatitis C and prior alcohol abuse), who saw NSGY for C4-C7 ACDF for management of myeloradiculopathy. Patient presented with signs and symptoms of left hand pain and myelopathy. MRI revealed severe canal stenosis C4-C7. Patient was admitted by NSGY for surgical intervention. [facility] has been consulted for medical management.       Overview/Hospital Course:  No notes on file    Interval History: No acute events. Pt states neck pain and dysphagia improving. Completed course of decadron.     Review of Systems   Constitutional:  Negative for activity change, appetite change, chills, diaphoresis, fatigue, fever and unexpected weight change.   HENT:  Positive for trouble swallowing.    Respiratory:  Negative for cough, chest tightness, shortness of breath, wheezing and stridor.    Cardiovascular:  Negative for chest pain, palpitations and leg swelling.   Gastrointestinal:  Negative for abdominal pain, blood in stool, constipation, diarrhea, nausea and vomiting.   Endocrine: Negative for cold intolerance and heat intolerance.   Genitourinary:  Negative for difficulty urinating, dysuria, flank pain and frequency.   Musculoskeletal:  Positive for back pain and neck pain. Negative for arthralgias, joint swelling and myalgias.   Skin: Negative.    Neurological:  Negative for dizziness, weakness, light-headedness and headaches.   Objective:     Vital Signs (Most Recent):  Temp: 98 °F (36.7 °C) (08/25/22 1135)  Pulse: 82 (08/25/22 1300)  Resp: 20 (08/25/22 1300)  BP: (!)  155/93 (08/25/22 1135)  SpO2: 96 % (08/25/22 1300)   Vital Signs (24h Range):  Temp:  [97.4 °F (36.3 °C)-98 °F (36.7 °C)] 98 °F (36.7 °C)  Pulse:  [82-99] 82  Resp:  [16-21] 20  SpO2:  [95 %-99 %] 96 %  BP: (139-185)/(69-96) 155/93     Weight: 118.2 kg (260 lb 9.3 oz)  Body mass index is 33.46 kg/m².    Intake/Output Summary (Last 24 hours) at 8/25/2022 1402  Last data filed at 8/25/2022 1202  Gross per 24 hour   Intake 360 ml   Output 0 ml   Net 360 ml        Physical Exam  Constitutional:       General: He is not in acute distress.     Appearance: He is not ill-appearing or toxic-appearing.   HENT:      Head: Normocephalic and atraumatic.   Eyes:      Conjunctiva/sclera: Conjunctivae normal.      Pupils: Pupils are equal, round, and reactive to light.   Cardiovascular:      Rate and Rhythm: Normal rate and regular rhythm.      Heart sounds: Normal heart sounds.   Pulmonary:      Effort: Pulmonary effort is normal.      Breath sounds: Normal breath sounds.   Abdominal:      General: Bowel sounds are normal.      Palpations: Abdomen is soft.      Tenderness: There is no abdominal tenderness. There is no guarding.   Musculoskeletal:         General: Normal range of motion.      Cervical back: Normal range of motion and neck supple. Tenderness present.      Right lower leg: No edema.      Left lower leg: No edema.   Skin:     General: Skin is warm and dry.   Neurological:      Mental Status: He is alert and oriented to person, place, and time. Mental status is at baseline.       Significant Labs: All pertinent labs within the past 24 hours have been reviewed.    Significant Imaging: I have reviewed all pertinent imaging results/findings within the past 24 hours.      Assessment/Plan:      * Myeloradiculopathy  C4, C5, C6, C7 anterior cervical discectomy and fusion  Procedure: ACDF 4-7  See Op Note for complete details.   Further mgnt per NSGY    Diabetes mellitus type II, uncontrolled  Patient's FSGs are uncontrolled  due to hyperglycemia on current medication regimen.  Last A1c reviewed-   Lab Results   Component Value Date    HGBA1C 9.7 (H) 08/16/2022     Most recent fingerstick glucose reviewed-   Recent Labs   Lab 08/24/22 2021 08/25/22  0011 08/25/22  0439 08/25/22  1134   POCTGLUCOSE 357* 298* 362* 223*     Current correctional scale  Medium  Maintain anti-hyperglycemic dose as follows-   Antihyperglycemics (From admission, onward)            Start     Stop Route Frequency Ordered    08/25/22 1130  insulin aspart U-100 pen 10 Units         -- SubQ 3 times daily with meals 08/25/22 0924    08/25/22 0930  insulin detemir U-100 pen 20 Units         -- SubQ 2 times daily 08/25/22 0924    08/22/22 1340  insulin aspart U-100 pen 1-10 Units         -- SubQ Before meals & nightly PRN 08/22/22 1243        Hold Oral hypoglycemics while patient is in the hospital.  Consult DM educator   Uncontrolled  Increase detemir to 20u BID and aspart 10u TID w/ meals  SSI w/ coverage    Essential hypertension  Resume home meds.   Increased CCB and add hydralazine  Improved    Thrombocytopenia  Appears chronic   Monitor while on heparin products   No mucocutaneous bleeding       Hypothyroidism  Resume synthroid         VTE Risk Mitigation (From admission, onward)         Ordered     heparin (porcine) injection 5,000 Units  Every 8 hours         08/22/22 1556     Place sequential compression device  Until discontinued         08/22/22 0557                Discharge Planning   JOJO:      Code Status: Prior   Is the patient medically ready for discharge?:     Reason for patient still in hospital (select all that apply): Patient trending condition and Treatment  Discharge Plan A: Home with family   Discharge Delays: None known at this time              Titi Tafoya MD  Department of Hospital Medicine   [facility] Surg

## 2022-08-26 VITALS
OXYGEN SATURATION: 97 % | HEIGHT: 74 IN | TEMPERATURE: 98 F | WEIGHT: 261.44 LBS | BODY MASS INDEX: 33.55 KG/M2 | RESPIRATION RATE: 18 BRPM | SYSTOLIC BLOOD PRESSURE: 130 MMHG | DIASTOLIC BLOOD PRESSURE: 82 MMHG | HEART RATE: 77 BPM

## 2022-08-26 LAB
POCT GLUCOSE: 176 MG/DL (ref 70–110)
POCT GLUCOSE: 216 MG/DL (ref 70–110)

## 2022-08-26 PROCEDURE — 91300 PHARM REV CODE 636 W HCPCS: CPT | Performed by: NEUROLOGICAL SURGERY

## 2022-08-26 PROCEDURE — 0004A HC IMMUNIZ ADMIN, SARS-COV-2 COVID-19 VACC, 30MCG/0.3ML, BOOSTER DOSE: CPT | Performed by: NEUROLOGICAL SURGERY

## 2022-08-26 PROCEDURE — 97530 THERAPEUTIC ACTIVITIES: CPT | Mod: CQ

## 2022-08-26 PROCEDURE — 94640 AIRWAY INHALATION TREATMENT: CPT

## 2022-08-26 PROCEDURE — 25000242 PHARM REV CODE 250 ALT 637 W/ HCPCS: Performed by: PHYSICIAN ASSISTANT

## 2022-08-26 PROCEDURE — 25000003 PHARM REV CODE 250: Performed by: PHYSICIAN ASSISTANT

## 2022-08-26 PROCEDURE — 63600175 PHARM REV CODE 636 W HCPCS: Performed by: NEUROLOGICAL SURGERY

## 2022-08-26 PROCEDURE — 63600175 PHARM REV CODE 636 W HCPCS: Performed by: PHYSICIAN ASSISTANT

## 2022-08-26 PROCEDURE — 94799 UNLISTED PULMONARY SVC/PX: CPT

## 2022-08-26 PROCEDURE — 99900035 HC TECH TIME PER 15 MIN (STAT)

## 2022-08-26 PROCEDURE — 94761 N-INVAS EAR/PLS OXIMETRY MLT: CPT

## 2022-08-26 PROCEDURE — 25000003 PHARM REV CODE 250: Performed by: INTERNAL MEDICINE

## 2022-08-26 RX ORDER — HYDRALAZINE HYDROCHLORIDE 25 MG/1
25 TABLET, FILM COATED ORAL EVERY 12 HOURS
Qty: 60 TABLET | Refills: 0 | Status: SHIPPED | OUTPATIENT
Start: 2022-08-26 | End: 2022-09-07 | Stop reason: SDUPTHER

## 2022-08-26 RX ORDER — NIFEDIPINE 90 MG/1
90 TABLET, EXTENDED RELEASE ORAL DAILY
Qty: 30 TABLET | Refills: 0 | Status: SHIPPED | OUTPATIENT
Start: 2022-08-27 | End: 2022-08-26 | Stop reason: SDUPTHER

## 2022-08-26 RX ORDER — METHOCARBAMOL 750 MG/1
750 TABLET, FILM COATED ORAL 3 TIMES DAILY PRN
Qty: 60 TABLET | Refills: 0 | Status: SHIPPED | OUTPATIENT
Start: 2022-08-26 | End: 2022-09-07 | Stop reason: SDUPTHER

## 2022-08-26 RX ORDER — OXYCODONE AND ACETAMINOPHEN 5; 325 MG/1; MG/1
1 TABLET ORAL EVERY 8 HOURS PRN
Qty: 60 TABLET | Refills: 0 | Status: SHIPPED | OUTPATIENT
Start: 2022-08-26 | End: 2022-09-07 | Stop reason: SDUPTHER

## 2022-08-26 RX ORDER — HYDRALAZINE HYDROCHLORIDE 25 MG/1
25 TABLET, FILM COATED ORAL EVERY 12 HOURS
Qty: 60 TABLET | Refills: 0 | Status: SHIPPED | OUTPATIENT
Start: 2022-08-26 | End: 2022-08-26 | Stop reason: SDUPTHER

## 2022-08-26 RX ORDER — NIFEDIPINE 90 MG/1
90 TABLET, EXTENDED RELEASE ORAL DAILY
Qty: 30 TABLET | Refills: 0 | Status: SHIPPED | OUTPATIENT
Start: 2022-08-27 | End: 2022-09-07 | Stop reason: SDUPTHER

## 2022-08-26 RX ADMIN — HEPARIN SODIUM 5000 UNITS: 5000 INJECTION INTRAVENOUS; SUBCUTANEOUS at 02:08

## 2022-08-26 RX ADMIN — LEVOTHYROXINE SODIUM 88 MCG: 88 TABLET ORAL at 05:08

## 2022-08-26 RX ADMIN — HYDROMORPHONE HYDROCHLORIDE 2 MG: 2 INJECTION, SOLUTION INTRAMUSCULAR; INTRAVENOUS; SUBCUTANEOUS at 11:08

## 2022-08-26 RX ADMIN — METHOCARBAMOL TABLETS 750 MG: 750 TABLET, COATED ORAL at 02:08

## 2022-08-26 RX ADMIN — LISINOPRIL 40 MG: 20 TABLET ORAL at 08:08

## 2022-08-26 RX ADMIN — ATORVASTATIN CALCIUM 20 MG: 20 TABLET, FILM COATED ORAL at 08:08

## 2022-08-26 RX ADMIN — NIFEDIPINE 90 MG: 30 TABLET, FILM COATED, EXTENDED RELEASE ORAL at 08:08

## 2022-08-26 RX ADMIN — GABAPENTIN 800 MG: 400 CAPSULE ORAL at 02:08

## 2022-08-26 RX ADMIN — HYDROMORPHONE HYDROCHLORIDE 2 MG: 2 INJECTION, SOLUTION INTRAMUSCULAR; INTRAVENOUS; SUBCUTANEOUS at 04:08

## 2022-08-26 RX ADMIN — HYDROMORPHONE HYDROCHLORIDE 2 MG: 2 INJECTION, SOLUTION INTRAMUSCULAR; INTRAVENOUS; SUBCUTANEOUS at 08:08

## 2022-08-26 RX ADMIN — MUPIROCIN: 20 OINTMENT TOPICAL at 08:08

## 2022-08-26 RX ADMIN — IPRATROPIUM BROMIDE AND ALBUTEROL SULFATE 3 ML: 2.5; .5 SOLUTION RESPIRATORY (INHALATION) at 12:08

## 2022-08-26 RX ADMIN — INSULIN ASPART 10 UNITS: 100 INJECTION, SOLUTION INTRAVENOUS; SUBCUTANEOUS at 12:08

## 2022-08-26 RX ADMIN — HYDRALAZINE HYDROCHLORIDE 25 MG: 25 TABLET, FILM COATED ORAL at 08:08

## 2022-08-26 RX ADMIN — IPRATROPIUM BROMIDE AND ALBUTEROL SULFATE 3 ML: 2.5; .5 SOLUTION RESPIRATORY (INHALATION) at 08:08

## 2022-08-26 RX ADMIN — TACROLIMUS 2 MG: 1 CAPSULE ORAL at 08:08

## 2022-08-26 RX ADMIN — METOPROLOL SUCCINATE 200 MG: 50 TABLET, EXTENDED RELEASE ORAL at 08:08

## 2022-08-26 RX ADMIN — GABAPENTIN 800 MG: 400 CAPSULE ORAL at 08:08

## 2022-08-26 RX ADMIN — BNT162B2 0.3 ML: 0.23 INJECTION, SUSPENSION INTRAMUSCULAR at 02:08

## 2022-08-26 RX ADMIN — METHOCARBAMOL TABLETS 750 MG: 750 TABLET, COATED ORAL at 08:08

## 2022-08-26 RX ADMIN — HEPARIN SODIUM 5000 UNITS: 5000 INJECTION INTRAVENOUS; SUBCUTANEOUS at 05:08

## 2022-08-26 RX ADMIN — INSULIN ASPART 2 UNITS: 100 INJECTION, SOLUTION INTRAVENOUS; SUBCUTANEOUS at 05:08

## 2022-08-26 RX ADMIN — INSULIN ASPART 10 UNITS: 100 INJECTION, SOLUTION INTRAVENOUS; SUBCUTANEOUS at 08:08

## 2022-08-26 RX ADMIN — LACTULOSE 15 G: 10 SOLUTION ORAL at 08:08

## 2022-08-26 RX ADMIN — ALLOPURINOL 100 MG: 100 TABLET ORAL at 08:08

## 2022-08-26 RX ADMIN — INSULIN ASPART 4 UNITS: 100 INJECTION, SOLUTION INTRAVENOUS; SUBCUTANEOUS at 12:08

## 2022-08-26 RX ADMIN — SERTRALINE HYDROCHLORIDE 100 MG: 100 TABLET ORAL at 08:08

## 2022-08-26 NOTE — PT/OT/SLP PROGRESS
Occupational Therapy      Patient Name:  Vick Gonzalez   MRN:  [id number]    Patient not seen today secondary to Patient unwilling to participate (AM 1135: Pt sitting EOB awaiting pain meds.  Pt declined therapy 2/2 discharge home.  Pt reports no OT needs.).     8/26/2022

## 2022-08-26 NOTE — PROGRESS NOTES
[facility] Surg  Neurosurgery  Progress Note    Subjective:     Interval History: Left arm and hand improved some.  Swallowing better.       History of Present Illness:  This is a very pleasant 66 y.o. male who is referred with the above symptoms.    He is s/p L CTR with continued left arm pain.  EMG ->              1. B CTS, mild              2. B ulnar neuropathy at wrist, mod              3. Chronic C7 > C5, C6 radiculopathies  Denies LE clumsiness or B/B dysfunction        Post-Op Info:         Post-Op Info:  Procedure(s) (LRB):  Procedure: ACDF 4-7 LOS: 4.0 Anesthesia: General Blood: Type & Screen Radiology: C-Arm Microscope: ------- SNS: EMG, SEP, MEP Brace: [place] Bed: Regular Bed, Shoulder Strap Headrest:------ Position: Supine Equipment: [place] (N/A)  DECOMPRESSION AND FUSION, SPINE, CERVICAL, ANTERIOR APPROACH   4 Days Post-Op      Medications:  Continuous Infusions:  Scheduled Meds:   acetaminophen  1,000 mg Oral Q8H    albuterol-ipratropium  3 mL Nebulization Q4H WAKE    allopurinoL  100 mg Oral BID    atorvastatin  20 mg Oral Daily    bisacodyL  10 mg Rectal Daily    gabapentin  800 mg Oral TID    heparin (porcine)  5,000 Units Subcutaneous Q8H    hydrALAZINE  25 mg Oral Q12H    insulin aspart U-100  10 Units Subcutaneous TIDWM    insulin detemir U-100  20 Units Subcutaneous BID    lactulose  15 g Oral Daily    levothyroxine  88 mcg Oral Before breakfast    lisinopriL  40 mg Oral Daily    methocarbamoL  750 mg Oral TID    metoprolol succinate  200 mg Oral Daily    mupirocin   Nasal BID    NIFEdipine  90 mg Oral Daily    sertraline  100 mg Oral Daily    tacrolimus  1 mg Oral Daily PM    tacrolimus  2 mg Oral Daily AM    traZODone  50 mg Oral QHS     PRN Meds:aluminum-magnesium hydroxide-simethicone, dextrose 10%, dextrose 10%, glucagon (human recombinant), glucose, glucose, hydrALAZINE, HYDROmorphone, insulin aspart U-100, labetalol, melatonin, ondansetron, oxyCODONE, oxyCODONE,  prochlorperazine, sars-cov-2 (covid-19), senna-docusate 8.6-50 mg, senna-docusate 8.6-50 mg, sodium chloride 0.9%, sodium chloride 0.9%     Review of Systems  Objective:     Weight: 118.6 kg (261 lb 7.5 oz)  Body mass index is 33.57 kg/m².  Vital Signs (Most Recent):  Temp: 98.1 °F (36.7 °C) (08/26/22 0819)  Pulse: 86 (08/26/22 0832)  Resp: 19 (08/26/22 0832)  BP: 125/88 (08/26/22 0819)  SpO2: 99 % (08/26/22 0832) Vital Signs (24h Range):  Temp:  [97.7 °F (36.5 °C)-98.7 °F (37.1 °C)] 98.1 °F (36.7 °C)  Pulse:  [75-94] 86  Resp:  [17-21] 19  SpO2:  [94 %-99 %] 99 %  BP: (125-173)/(83-98) 125/88                          Neurosurgery Physical Exam     General: well developed, well nourished, no distress  Mental Status: Awake, Alert, Oriented X3.Thought content appropriate  GCS: Motor: 6/Verbal: 5/Eyes: 4 GCS Total: 15     Motor Strength:  Strength   Deltoids Triceps Biceps Wrist Extension Wrist Flexion Hand    Upper: R 5/5 4/5 4/5 5/5 5/5 4/5     L 5/5 4/5 5/5 5/5 5/5 4/5       HF KF KE DF PF EHL   Lower: R 5/5 5/5 5/5 5/5 5/5 5/5     L 5/5 5/5 5/5 5/5 5/5 5/5         Castro: present B/L  Clonus: absent B/L  Incision- CDI         Significant Labs:  Recent Labs   Lab 08/25/22  0534   *      K 5.1   CL 99   CO2 25   BUN 23   CREATININE 1.1   CALCIUM 9.7   MG 2.0     Recent Labs   Lab 08/25/22  0534   WBC 11.70   HGB 12.7*   HCT 38.9*        No results for input(s): LABPT, INR, APTT in the last 48 hours.  Microbiology Results (last 7 days)     ** No results found for the last 168 hours. **            Assessment/Plan:     Active Diagnoses:    Diagnosis Date Noted POA    PRINCIPAL PROBLEM:  Myeloradiculopathy [G54.9] 08/22/2022 Yes    Diabetes mellitus type II, uncontrolled [E11.65] 10/04/2016 Yes    Essential hypertension [I10] 06/19/2015 Yes    Hypothyroidism [E03.9]  Yes    Thrombocytopenia [D69.6] 06/16/2010 Yes      Problems Resolved During this Admission:     A/P:  POD #4 C4-7  ACDF                --Neurologically stable          -q4h neuro checks  --All pertinent labs and diagnostics personally reviewed  --Imaging: Post op xrays show good hardware placement  --Drains: Continue  --Pain control: Tylenol 1000mg Q 8 hours, Oxycodone IR 5mg Q 4 hours PRN, Oxycodone IR 10mg Q 4 hours PRN, Robaxin 750mg TID, Dilaudid 2mg IV Q 3 hours PRN severe pain,   --DVT ppx: SCDs/SQH  --Activity: PT/OT, OOB. C-collar.    --Diet: Soft diet  --Bowel regimen: PRN suppository, senna PRN, lactulose daily  --Urinary: Voiding spontaneously  --Atelectasis ppx: Encourage IS hourly  --Decadron 4mg Q6 hrs for 48 hours completed   --Consult ST for swallow eval.  Slowly improving  --Appreciate  recs.  DM and HTN treatment adjusted yesterday      Dispo: Home with HHPTOT and DME today     All of the above discussed and reviewed with Dr. Gino Gilbert PA-C  Neurosurgery  [facility]

## 2022-08-26 NOTE — PT/OT/SLP PROGRESS
Physical Therapy Treatment    Patient Name:  Vick Gonzalez   MRN:  [id number]    Recommendations:     Discharge Recommendations:  home health PT   Discharge Equipment Recommendations: walker, rolling   Barriers to discharge: decreased mobility,strength and endurance    Assessment:     Vick Gonzalez is a 66 y.o. male admitted with a medical diagnosis of Myeloradiculopathy.  He presents with the following impairments/functional limitations:  weakness, impaired endurance, impaired functional mobility, gait instability, impaired balance, decreased lower extremity function, decreased ROM, impaired coordination, impaired skin, orthopedic precautions,pt with possible discharge home today and will benefit from  services to address above functional limitations.    Rehab Prognosis: Good; patient would benefit from acute skilled PT services to address these deficits and reach maximum level of function.    Recent Surgery: Procedure(s) (LRB):  Procedure: ACDF 4-7 LOS: 4.0 Anesthesia: General Blood: Type & Screen Radiology: C-Arm Microscope: ------- SNS: EMG, SEP, MEP Brace: [place] Bed: Regular Bed, Shoulder Strap Headrest:------ Position: Supine Equipment: [place] (N/A)  DECOMPRESSION AND FUSION, SPINE, CERVICAL, ANTERIOR APPROACH 4 Days Post-Op    Plan:     During this hospitalization, patient to be seen daily to address the identified rehab impairments via gait training, therapeutic activities, therapeutic exercises, neuromuscular re-education and progress toward the following goals:    · Plan of Care Expires:  09/22/22    Subjective     Chief Complaint: n/a  Patient/Family Comments/goals: pt declined OOB today,going home.  Pain/Comfort:  · Pain Rating 1:  (no c/o's)      Objective:     Communicated with nsg prior to session.  Patient found supine with cervical collar, peripheral IV upon PT entry to room.     General Precautions: Standard, fall   Orthopedic Precautions:spinal precautions   Braces: Aspen collar  Respiratory  Status: Room air     Functional Mobility:  · Gait: n/a      AM-PAC 6 CLICK MOBILITY  Turning over in bed (including adjusting bedclothes, sheets and blankets)?: 4  Sitting down on and standing up from a chair with arms (e.g., wheelchair, bedside commode, etc.): 3  Moving from lying on back to sitting on the side of the bed?: 4  Moving to and from a bed to a chair (including a wheelchair)?: 3  Need to walk in hospital room?: 3  Climbing 3-5 steps with a railing?: 2  Basic Mobility Total Score: 19       Therapeutic Activities and Exercises: reviewed pt safety and issued pt HEP upon discharge home.       Patient left supine with all lines intact and call button in reach..    GOALS:   Multidisciplinary Problems     Physical Therapy Goals        Problem: Physical Therapy    Goal Priority Disciplines Outcome Goal Variances Interventions   Physical Therapy Goal     PT, PT/OT Ongoing, Progressing     Description: Goals to be met by: 2022    Patient will increase functional independence with mobility by performin. Supine to sit with Modified [place]  2. Sit to supine with Modified [place]  3. Rolling to Left and Right with Modified [place].  4. Sit to stand transfer with Modified [place]  5. Gait  x 200 feet with Modified [place] using Rolling Walker.                      Time Tracking:     PT Received On: 22  PT Start Time: 821     PT Stop Time: 831  PT Total Time (min): 10 min     Billable Minutes: Therapeutic Activity 10    Treatment Type: Treatment  PT/PTA: PTA     PTA Visit Number: 3     2022

## 2022-08-26 NOTE — DISCHARGE SUMMARY
[facility] Surg  Neurosurgery  Discharge Summary      Patient Name: Vick Gonzalez  MRN: [id number]  Admission Date: 8/22/2022  Hospital Length of Stay: 0 days  Discharge Date and Time: 8/26/2022  2:56 PM  Attending Physician: Titi Christian MD  Discharging Provider: Paige Gilbert PA-C  Primary Care Provider: Emanuel Lopez MD     History of Present Illness:  This is a very pleasant 66 y.o. male who is referred with the above symptoms.    He is s/p L CTR with continued left arm pain.  EMG ->              1. B CTS, mild              2. B ulnar neuropathy at wrist, mod              3. Chronic C7 > C5, C6 radiculopathies  Denies LE clumsiness or B/B dysfunction            Procedure(s) (LRB):  Procedure: ACDF 4-7 LOS: 4.0 Anesthesia: General Blood: Type & Screen Radiology: C-Arm Microscope: ------- SNS: EMG, SEP, MEP Brace: [place] Bed: Regular Bed, Shoulder Strap Headrest:------ Position: Supine Equipment: [place] (N/A)  DECOMPRESSION AND FUSION, SPINE, CERVICAL, ANTERIOR APPROACH     Hospital Course:  Patient had the above named procedure.  Postoperatively he had difficulty swallowing so speech therapy was ordered.  The initially put him on full liquids and advanced him slowly.  This improved over time with the Decadron for 48 hours.  He had less left arm pain and left hand cramping.  He is walking well with physical therapy.  His hypertension and diabetes were not under control so hospital medicine adjusted the medications some which improved this.  Cervical spine x-ray showed good hardware placement.  Drain was discontinued on postop day 3 and he was discharged home in stable condition with home health physical therapy occupational therapy DME on postop day 4.  He was sent with an increased blood pressure medication new blood pressure medication.  He will follow up in clinic in 2 weeks with for wound check.  He should also follow-up with his primary care within 1-2 weeks for hospital follow-up and for further  recommendation regarding hypertension treatment and diabetes treatment going forward.    Consults:   Consults (From admission, onward)        Status Ordering Provider     Inpatient consult to Social Work  Once        Provider:  (Not yet assigned)    Acknowledged CARLEEN CAMPBELL     Inpatient consult to Diabetes educator  Once        Provider:  (Not yet assigned)    Acknowledged DENISE RASHID     [facility]  Once        Provider:  (Not yet assigned)    Acknowledged NANCI LIU              Pending Diagnostic Studies:     None        Final Active Diagnoses:    Diagnosis Date Noted POA    PRINCIPAL PROBLEM:  Myeloradiculopathy [G54.9] 08/22/2022 Yes    Diabetes mellitus type II, uncontrolled [E11.65] 10/04/2016 Yes    Essential hypertension [I10] 06/19/2015 Yes    Hypothyroidism [E03.9]  Yes    Thrombocytopenia [D69.6] 06/16/2010 Yes      Problems Resolved During this Admission:      Discharged Condition: good    Disposition: Home-Health Care [facility]    Follow Up:    Patient Instructions:      COVID-19 PFIZER 2ND DOSAGE APPT REQUEST   Standing Status: Future Standing Exp. Date: 08/26/23     Order Specific Question Answer Comments   Schedule the second dosage on this date: 9/16/2022      Medications:  Reconciled Home Medications:      Medication List      START taking these medications    hydrALAZINE 25 MG tablet  Commonly known as: APRESOLINE  Take 1 tablet (25 mg total) by mouth every 12 (twelve) hours.     methocarbamoL 750 MG Tab  Commonly known as: ROBAXIN  Take 1 tablet (750 mg total) by mouth 3 (three) times daily as needed (muscle spasms).     NIFEdipine 90 MG (OSM) 24 hr tablet  Commonly known as: PROCARDIA-XL  Take 1 tablet (90 mg total) by mouth once daily.  Start taking on: August 27, 2022  Replaces: NIFEdipine 60 MG Tbsr     oxyCODONE-acetaminophen 5-325 mg per tablet  Commonly known as: PERCOCET  Take 1 tablet by mouth every 8 (eight) hours as needed for  Pain.        CHANGE how you take these medications    allopurinoL 100 MG tablet  Commonly known as: ZYLOPRIM  TAKE 1 TABLET BY MOUTH TWICE A DAY  What changed: when to take this     aspirin 81 MG Chew  Take 1 tablet (81 mg total) by mouth once daily.  What changed: additional instructions     gabapentin 800 MG tablet  Commonly known as: NEURONTIN  TAKE 1 TABLET BY MOUTH THREE TIMES A DAY  What changed: Another medication with the same name was removed. Continue taking this medication, and follow the directions you see here.     levothyroxine 88 MCG tablet  Commonly known as: SYNTHROID  TAKE 1 TABLET BY MOUTH EVERY DAY  What changed: when to take this        CONTINUE taking these medications    albuterol 90 mcg/actuation inhaler  Commonly known as: VENTOLIN HFA  Inhale 2 puffs into the lungs every 6 (six) hours as needed for Wheezing or Shortness of Breath. Rescue     aluminum-magnesium hydroxide-simethicone 200-200-20 mg/5 mL Susp  Commonly known as: MAALOX  Take 30 mLs by mouth 4 (four) times daily before meals and nightly.     blood sugar diagnostic Strp  Commonly known as: TRUE METRIX GLUCOSE TEST STRIP  USE 3 TIMES DAILY TO TEST BLOOD GLUCOSE LEVEL     calcium carbonate-vitamin D3 600 mg-10 mcg (400 unit) Chew  Commonly known as: CALCIUM 600 WITH VITAMIN D3  Take 1 tablet by mouth once daily.     FREESTYLE PALLAVI 2 SENSOR Kit  Generic drug: flash glucose sensor  One sensor every 14 days     * glucose 4 GM chewable tablet  Take 4 tablets (16 g total) by mouth as needed for Low blood sugar.     * glucose 4 GM chewable tablet  Take 4 tablets (16 g total) by mouth as needed for Low blood sugar (If having symptoms of blurry vision, palpitations, confusion, shakiness.  Please check sugars and if sugar below 70 please take 4 tablets and re-check sugar everry 15 minutes until sugars are above 70 and symptoms resolve.).     HumaLOG KwikPen Insulin 100 unit/mL pen  Generic drug: insulin lispro  Inject 20 Units into the  "skin 3 (three) times daily with meals.     lancets 30 gauge Misc  1 lancet by Misc.(Non-Drug; Combo Route) route 4 (four) times daily before meals and nightly.     lisinopriL 40 MG tablet  Commonly known as: PRINIVIL,ZESTRIL  TAKE 1 TABLET BY MOUTH EVERY DAY     metoprolol succinate 200 MG 24 hr tablet  Commonly known as: TOPROL-XL  Take 1 tablet (200 mg total) by mouth once daily.     multivitamin per tablet  Commonly known as: THERAGRAN  Take 1 tablet by mouth once daily.     * NOVOFINE PLUS 32 gauge x 1/6" Ndle  Generic drug: pen needle, diabetic     * BD ULTRA-FINE MENDY PEN NEEDLE 32 gauge x 5/32" Ndle  Generic drug: pen needle, diabetic     * BD ULTRA-FINE MENDY PEN NEEDLE 32 gauge x 5/32" Ndle  Generic drug: pen needle, diabetic  TEST WITH NOVOLOG THREE TIMES A DAY WITH MEALS AND WITH LEVEMIR AT BEDTIME     ondansetron 4 MG Tbdl  Commonly known as: ZOFRAN-ODT  Take 1 tablet (4 mg total) by mouth every 6 (six) hours as needed (Nausea).     rosuvastatin 5 MG tablet  Commonly known as: CRESTOR  TAKE 1 TABLET BY MOUTH EVERY DAY     sertraline 100 MG tablet  Commonly known as: ZOLOFT  TAKE 1 TABLET (100 MG TOTAL) BY MOUTH ONCE DAILY.     tacrolimus 1 MG Cap  Commonly known as: PROGRAF  TAKE 2 CAPSULES (2 MG TOTAL) BY MOUTH IN THE MORNING & TAKE 1 CAPSULE (1 MG TOTAL) IN THE EVENING.     TOUJEO MAX U-300 SOLOSTAR 300 unit/mL (3 mL) insulin pen  Generic drug: insulin glargine U-300 conc  Inject 40 Units into the skin once daily.     traZODone 50 MG tablet  Commonly known as: DESYREL  TAKE 1 TABLET (50 MG TOTAL) BY MOUTH EVERY EVENING.     TRUE METRIX GLUCOSE METER Misc  Generic drug: blood-glucose meter  TEST 4 (FOUR) TIMES DAILY BEFORE MEALS AND NIGHTLY.         * This list has 5 medication(s) that are the same as other medications prescribed for you. Read the directions carefully, and ask your doctor or other care provider to review them with you.            STOP taking these medications    HYDROcodone-acetaminophen " 5-325 mg per tablet  Commonly known as: NORCO     NIFEdipine 60 MG Tbsr  Commonly known as: ADALAT CC  Replaced by: NIFEdipine 90 MG (OSM) 24 hr tablet            Paige Gilbert PA-C  Neurosurgery  [place] [facility]

## 2022-08-26 NOTE — DISCHARGE INSTRUCTIONS
Please follow ONLY the instructions that are checked below.    Activity Restrictions:  [x]  Return to work will be determined on an individual basis.  [x]  No lifting greater than 10 pounds.  [x]  Avoid bending and twisting the area of your surgery more than 45 degrees from neutral position in any direction.  [x]  No driving or operating machinery:  []  until cleared by your surgeon.  [x]  while taking narcotic pain medications or muscle relaxants.  []  No cervical collar, soft collar, or lumbar brace required.  [x]  Wear your brace at all times. Ok to take off when showering and eating.  []  Wear your brace at all times except when flat in bed.  []  Wear brace for comfort only.  [x]  Increase ambulation over the next 2 weeks so that you are walking 2 miles per day at 2 weeks post-operatively.  [x]  Walk on paved surfaces only. It is okay to walk up and down stairs while holding onto a side rail.  [x]  No sexual activity for 2-3 weeks.    Discharge Medication/Follow-up:  [x]  Please refer to discharge medication reconciliation form.  [x]  Do not take ANY non-steroidal anti-inflammatory drugs (NSAIDS), including the following: ibuprofen, naprosyn, Aleve, Advil, Indocin, Mobic, or   []  4 weeks  []  8 weeks  [x]  6 months  [x]  Prescriptions for appropriate medication will be given upon discharge.   [x]  Pain control:             [x]  Muscle relaxer:            [x]  Take docusate (Colace 100 mg): take one capsule a day as needed for constipation. You can get this over the counter.  [x]  Follow-up appointment:  [x]  10-14 days post-op for wound check by physician assistant/nurse  [x]  4-6 weeks with MD:  [x]  with x-rays  []  without x-rays  []  An appointment will be mailed to you.    Wound Care:  []  Remove dressing or bandaid in    days.  [x]  No bandage required. Keep your incision open to the air.  [x]  You may shower on the 2nd day after your surgery. Have the force of water hit you opposite from the incision.  Pat the incision dry after your shower; do not scrub the incision.  []  You cannot take a bath until 8 weeks after surgery.  []  Apply bacitracin to incision twice a day for    more days.    Call your doctor or go to the Emergency Room for any signs of infection, including: increased redness, drainage, pain, or fever (temperature ?101.5 for 24 hours). Call your doctor or go to the Emergency Room if there are any localized neurological changes; problems with speech, vision, numbness, tingling, weakness, or severe headache; or for other concerns.    Special Instructions:  [x]  No use of tobacco products.  [x]  Diet: Please eat a soft diabetic diet as tolerated.  []  Other diet:              Specific physician instructions:    FU with PCP in 1-2 weeks for further evaluation of HTN and DM treatment       Physicians need 3 days' notice for pain medicine to be refilled. Pain medicine will only be refilled between 8 AM and 5 PM, Monday through Friday, due to Food and Drug Administration regulation of documentation.    If you have any questions about this form, please call [phone].    Form No. 36134 (Revised 10/31/2013)

## 2022-08-26 NOTE — PROGRESS NOTES
[facility]                    Pharmacy       Discharge Medication Education    Patient ACCEPTED medication education. Pharmacy has provided education on the name, indication, and possible side effects of the medication(s) prescribed, using teach-back method.     The following medications have also been discussed, during this admission.        Medication List        START taking these medications      hydrALAZINE 25 MG tablet  Commonly known as: APRESOLINE  Take 1 tablet (25 mg total) by mouth every 12 (twelve) hours.     methocarbamoL 750 MG Tab  Commonly known as: ROBAXIN  Take 1 tablet (750 mg total) by mouth 3 (three) times daily as needed (muscle spasms).     NIFEdipine 90 MG (OSM) 24 hr tablet  Commonly known as: PROCARDIA-XL  Take 1 tablet (90 mg total) by mouth once daily.  Start taking on: August 27, 2022  Replaces: NIFEdipine 60 MG Tbsr     oxyCODONE-acetaminophen 5-325 mg per tablet  Commonly known as: PERCOCET  Take 1 tablet by mouth every 8 (eight) hours as needed for Pain.            CHANGE how you take these medications      allopurinoL 100 MG tablet  Commonly known as: ZYLOPRIM  TAKE 1 TABLET BY MOUTH TWICE A DAY  What changed: when to take this     aspirin 81 MG Chew  Take 1 tablet (81 mg total) by mouth once daily.  What changed: additional instructions     gabapentin 800 MG tablet  Commonly known as: NEURONTIN  TAKE 1 TABLET BY MOUTH THREE TIMES A DAY  What changed: Another medication with the same name was removed. Continue taking this medication, and follow the directions you see here.     levothyroxine 88 MCG tablet  Commonly known as: SYNTHROID  TAKE 1 TABLET BY MOUTH EVERY DAY  What changed: when to take this            CONTINUE taking these medications      albuterol 90 mcg/actuation inhaler  Commonly known as: VENTOLIN HFA  Inhale 2 puffs into the lungs every 6 (six) hours as needed for Wheezing or Shortness of Breath. Rescue     aluminum-magnesium hydroxide-simethicone  "200-200-20 mg/5 mL Susp  Commonly known as: MAALOX  Take 30 mLs by mouth 4 (four) times daily before meals and nightly.     blood sugar diagnostic Strp  Commonly known as: TRUE METRIX GLUCOSE TEST STRIP  USE 3 TIMES DAILY TO TEST BLOOD GLUCOSE LEVEL     calcium carbonate-vitamin D3 600 mg-10 mcg (400 unit) Chew  Commonly known as: CALCIUM 600 WITH VITAMIN D3     FREESTYLE PALLAVI 2 SENSOR Kit  Generic drug: flash glucose sensor  One sensor every 14 days     * glucose 4 GM chewable tablet  Take 4 tablets (16 g total) by mouth as needed for Low blood sugar.     * glucose 4 GM chewable tablet  Take 4 tablets (16 g total) by mouth as needed for Low blood sugar (If having symptoms of blurry vision, palpitations, confusion, shakiness.  Please check sugars and if sugar below 70 please take 4 tablets and re-check sugar everry 15 minutes until sugars are above 70 and symptoms resolve.).     HumaLOG KwikPen Insulin 100 unit/mL pen  Generic drug: insulin lispro  Inject 20 Units into the skin 3 (three) times daily with meals.     lancets 30 gauge Misc  1 lancet by Misc.(Non-Drug; Combo Route) route 4 (four) times daily before meals and nightly.     lisinopriL 40 MG tablet  Commonly known as: PRINIVIL,ZESTRIL  TAKE 1 TABLET BY MOUTH EVERY DAY     metoprolol succinate 200 MG 24 hr tablet  Commonly known as: TOPROL-XL  Take 1 tablet (200 mg total) by mouth once daily.     multivitamin per tablet  Commonly known as: THERAGRAN     * NOVOFINE PLUS 32 gauge x 1/6" Ndle  Generic drug: pen needle, diabetic     * BD ULTRA-FINE MENDY PEN NEEDLE 32 gauge x 5/32" Ndle  Generic drug: pen needle, diabetic     * BD ULTRA-FINE MENDY PEN NEEDLE 32 gauge x 5/32" Ndle  Generic drug: pen needle, diabetic  TEST WITH NOVOLOG THREE TIMES A DAY WITH MEALS AND WITH LEVEMIR AT BEDTIME     ondansetron 4 MG Tbdl  Commonly known as: ZOFRAN-ODT  Take 1 tablet (4 mg total) by mouth every 6 (six) hours as needed (Nausea).     rosuvastatin 5 MG tablet  Commonly " known as: CRESTOR  TAKE 1 TABLET BY MOUTH EVERY DAY     sertraline 100 MG tablet  Commonly known as: ZOLOFT  TAKE 1 TABLET (100 MG TOTAL) BY MOUTH ONCE DAILY.     tacrolimus 1 MG Cap  Commonly known as: PROGRAF  TAKE 2 CAPSULES (2 MG TOTAL) BY MOUTH IN THE MORNING & TAKE 1 CAPSULE (1 MG TOTAL) IN THE EVENING.     TOUJEO MAX U-300 SOLOSTAR 300 unit/mL (3 mL) insulin pen  Generic drug: insulin glargine U-300 conc  Inject 40 Units into the skin once daily.     traZODone 50 MG tablet  Commonly known as: DESYREL  TAKE 1 TABLET (50 MG TOTAL) BY MOUTH EVERY EVENING.     TRUE METRIX GLUCOSE METER Misc  Generic drug: blood-glucose meter  TEST 4 (FOUR) TIMES DAILY BEFORE MEALS AND NIGHTLY.           * This list has 5 medication(s) that are the same as other medications prescribed for you. Read the directions carefully, and ask your doctor or other care provider to review them with you.                STOP taking these medications      HYDROcodone-acetaminophen 5-325 mg per tablet  Commonly known as: NORCO     NIFEdipine 60 MG Tbsr  Commonly known as: ADALAT CC  Replaced by: NIFEdipine 90 MG (OSM) 24 hr tablet               Where to Get Your Medications        These medications were sent to [facility]  [address], [place] [address]      Hours: Mon-Fri, 8a-5:30p Phone: [phone]   hydrALAZINE 25 MG tablet  methocarbamoL 750 MG Tab  NIFEdipine 90 MG (OSM) 24 hr tablet  oxyCODONE-acetaminophen 5-325 mg per tablet          Thank you  Vinay Horton, PharmD  [phone]

## 2022-08-26 NOTE — PLAN OF CARE
D/c orders noted.     Pt received rolling walker at bedside.     Pt denied HH services upon d/c.     Pt's family is current on the way to transport pt home.     Pt is cleared to go from CM standpoint.     Future Appointments   Date Time Provider Department Center   9/6/2022  8:45 AM RAJWINDER OIC-US1 MASTER [facility]TR [facility]   9/7/2022  8:30 AM Paige Gilbert PA-C [facility]   10/4/2022  9:40 AM Titi Christian MD [facility]   10/24/2022  9:45 AM [facility] [place]         08/26/22 1321   Final Note   Assessment Type Final Discharge Note   Anticipated Discharge Disposition Home   What phone number can be called within the next 1-3 days to see how you are doing after discharge? [phone]   Hospital Resources/Appts/Education Provided Appointments scheduled by Navigator/Coordinator   Hospital Follow Up  Appt(s) scheduled? Yes   Any social issues identified prior to discharge? No   Post-Acute Status   Post-Acute Authorization Home Health   Home Health Status Patient declined/refused   Coverage [facility] Managed Medicare   Discharge Delays None known at this time

## 2022-08-26 NOTE — PLAN OF CARE
AOX4. VS stable. Safety maintained. Meds given per MAR. Pain treated with PRN meds. Blood glucose monitored. Aspen collar in place. Dressing to anterior neck CDI. Resting quietly. SR up X 2. Call light in reach. Bed locked in lowest position. Will continue to monitor.       Problem: Adult Inpatient Plan of Care  Goal: Plan of Care Review  Outcome: Ongoing, Progressing  Goal: Patient-Specific Goal (Individualized)  Outcome: Ongoing, Progressing     Problem: Diabetes Comorbidity  Goal: Blood Glucose Level Within Targeted Range  Outcome: Ongoing, Progressing     Problem: Infection  Goal: Absence of Infection Signs and Symptoms  Outcome: Ongoing, Progressing

## 2022-08-26 NOTE — PLAN OF CARE
Problem: Physical Therapy  Goal: Physical Therapy Goal  Description: Goals to be met by: 2022    Patient will increase functional independence with mobility by performin. Supine to sit with Modified [place]  2. Sit to supine with Modified [place]  3. Rolling to Left and Right with Modified [place].  4. Sit to stand transfer with Modified [place]  5. Gait  x 200 feet with Modified [place] using Rolling Walker.     Outcome: Ongoing, Progressing

## 2022-09-06 ENCOUNTER — TELEPHONE (OUTPATIENT)
Dept: TRANSPLANT | Facility: CLINIC | Age: 67
End: 2022-09-06
Payer: MEDICARE

## 2022-09-06 ENCOUNTER — HOSPITAL ENCOUNTER (OUTPATIENT)
Dept: RADIOLOGY | Facility: HOSPITAL | Age: 67
Discharge: HOME | End: 2022-09-06
Attending: INTERNAL MEDICINE
Payer: MEDICARE

## 2022-09-06 DIAGNOSIS — Z94.4 S/P LIVER TRANSPLANT: ICD-10-CM

## 2022-09-06 DIAGNOSIS — Z94.4 S/P LIVER TRANSPLANT: Primary | ICD-10-CM

## 2022-09-06 PROCEDURE — 93976 US DOPPLER LIVER TRANSPLANT POST (XPD): ICD-10-PCS | Mod: 26,,, | Performed by: RADIOLOGY

## 2022-09-06 PROCEDURE — 76705 US DOPPLER LIVER TRANSPLANT POST (XPD): ICD-10-PCS | Mod: 26,59,, | Performed by: RADIOLOGY

## 2022-09-06 PROCEDURE — 93976 VASCULAR STUDY: CPT | Mod: TC

## 2022-09-06 PROCEDURE — 76705 ECHO EXAM OF ABDOMEN: CPT | Mod: 26,59,, | Performed by: RADIOLOGY

## 2022-09-06 PROCEDURE — 76705 ECHO EXAM OF ABDOMEN: CPT | Mod: TC

## 2022-09-06 PROCEDURE — 93976 VASCULAR STUDY: CPT | Mod: 26,,, | Performed by: RADIOLOGY

## 2022-09-06 NOTE — TELEPHONE ENCOUNTER
Letter sent, ultrasound stable  ----- Message from James Coleman MD sent at 9/6/2022 10:07 AM CDT -----  Results reviewed

## 2022-09-06 NOTE — LETTER
September 6, 2022    Vick Longoria Carlos  [address] [address]             [facility]  [address]  [place] [address]  Phone: [phone] Mr. Gonzalez,    Dr. Coleman reviewed your liver ultrasound. He said it is stable. We will have you repeat another ultrasound in 6 months - 3/2023.    Please call the office with any questions or concerns.    Sincerely,    Kandy Herrera RN, BSN, CCTC  Sr. Liver Transplant Coordinator

## 2022-09-07 ENCOUNTER — HOSPITAL ENCOUNTER (OUTPATIENT)
Dept: RADIOLOGY | Facility: HOSPITAL | Age: 67
Discharge: HOME | End: 2022-09-07
Attending: PHYSICIAN ASSISTANT
Payer: MEDICARE

## 2022-09-07 ENCOUNTER — OFFICE VISIT (OUTPATIENT)
Dept: PRIMARY CARE CLINIC | Facility: CLINIC | Age: 67
End: 2022-09-07
Payer: MEDICARE

## 2022-09-07 ENCOUNTER — OFFICE VISIT (OUTPATIENT)
Dept: NEUROSURGERY | Facility: CLINIC | Age: 67
End: 2022-09-07
Payer: MEDICARE

## 2022-09-07 VITALS
SYSTOLIC BLOOD PRESSURE: 148 MMHG | HEIGHT: 74 IN | HEART RATE: 87 BPM | BODY MASS INDEX: 33.38 KG/M2 | WEIGHT: 260.13 LBS | DIASTOLIC BLOOD PRESSURE: 88 MMHG | TEMPERATURE: 98 F

## 2022-09-07 VITALS
TEMPERATURE: 98 F | BODY MASS INDEX: 33.3 KG/M2 | WEIGHT: 259.38 LBS | SYSTOLIC BLOOD PRESSURE: 142 MMHG | HEART RATE: 91 BPM | DIASTOLIC BLOOD PRESSURE: 85 MMHG | OXYGEN SATURATION: 97 %

## 2022-09-07 DIAGNOSIS — Z98.1 S/P CERVICAL SPINAL FUSION: Primary | ICD-10-CM

## 2022-09-07 DIAGNOSIS — I10 ESSENTIAL HYPERTENSION: Primary | ICD-10-CM

## 2022-09-07 DIAGNOSIS — Z98.1 HX OF FUSION OF CERVICAL SPINE: ICD-10-CM

## 2022-09-07 DIAGNOSIS — Z98.1 S/P CERVICAL SPINAL FUSION: ICD-10-CM

## 2022-09-07 DIAGNOSIS — E11.65 UNCONTROLLED TYPE 2 DIABETES MELLITUS WITH HYPERGLYCEMIA: ICD-10-CM

## 2022-09-07 PROCEDURE — 1160F PR REVIEW ALL MEDS BY PRESCRIBER/CLIN PHARMACIST DOCUMENTED: ICD-10-PCS | Mod: CPTII,S$GLB,, | Performed by: PHYSICIAN ASSISTANT

## 2022-09-07 PROCEDURE — 3046F PR MOST RECENT HEMOGLOBIN A1C LEVEL > 9.0%: ICD-10-PCS | Mod: CPTII,S$GLB,, | Performed by: PHYSICIAN ASSISTANT

## 2022-09-07 PROCEDURE — 3079F PR MOST RECENT DIASTOLIC BLOOD PRESSURE 80-89 MM HG: ICD-10-PCS | Mod: CPTII,S$GLB,, | Performed by: INTERNAL MEDICINE

## 2022-09-07 PROCEDURE — 3008F BODY MASS INDEX DOCD: CPT | Mod: CPTII,S$GLB,, | Performed by: INTERNAL MEDICINE

## 2022-09-07 PROCEDURE — 3066F NEPHROPATHY DOC TX: CPT | Mod: CPTII,S$GLB,, | Performed by: INTERNAL MEDICINE

## 2022-09-07 PROCEDURE — 72040 X-RAY EXAM NECK SPINE 2-3 VW: CPT | Mod: 26,,, | Performed by: RADIOLOGY

## 2022-09-07 PROCEDURE — 1159F MED LIST DOCD IN RCRD: CPT | Mod: CPTII,S$GLB,, | Performed by: INTERNAL MEDICINE

## 2022-09-07 PROCEDURE — 1160F PR REVIEW ALL MEDS BY PRESCRIBER/CLIN PHARMACIST DOCUMENTED: ICD-10-PCS | Mod: CPTII,S$GLB,, | Performed by: INTERNAL MEDICINE

## 2022-09-07 PROCEDURE — 3066F PR DOCUMENTATION OF TREATMENT FOR NEPHROPATHY: ICD-10-PCS | Mod: CPTII,S$GLB,, | Performed by: PHYSICIAN ASSISTANT

## 2022-09-07 PROCEDURE — 3077F PR MOST RECENT SYSTOLIC BLOOD PRESSURE >= 140 MM HG: ICD-10-PCS | Mod: CPTII,S$GLB,, | Performed by: PHYSICIAN ASSISTANT

## 2022-09-07 PROCEDURE — 72040 XR CERVICAL SPINE AP LATERAL: ICD-10-PCS | Mod: 26,,, | Performed by: RADIOLOGY

## 2022-09-07 PROCEDURE — 4010F ACE/ARB THERAPY RXD/TAKEN: CPT | Mod: CPTII,S$GLB,, | Performed by: PHYSICIAN ASSISTANT

## 2022-09-07 PROCEDURE — 1160F RVW MEDS BY RX/DR IN RCRD: CPT | Mod: CPTII,S$GLB,, | Performed by: PHYSICIAN ASSISTANT

## 2022-09-07 PROCEDURE — 72040 X-RAY EXAM NECK SPINE 2-3 VW: CPT | Mod: TC,FY

## 2022-09-07 PROCEDURE — 3008F PR BODY MASS INDEX (BMI) DOCUMENTED: ICD-10-PCS | Mod: CPTII,S$GLB,, | Performed by: INTERNAL MEDICINE

## 2022-09-07 PROCEDURE — 99999 PR PBB SHADOW E&M-EST. PATIENT-LVL V: CPT | Mod: PBBFAC,,, | Performed by: INTERNAL MEDICINE

## 2022-09-07 PROCEDURE — 3046F HEMOGLOBIN A1C LEVEL >9.0%: CPT | Mod: CPTII,S$GLB,, | Performed by: INTERNAL MEDICINE

## 2022-09-07 PROCEDURE — 3079F DIAST BP 80-89 MM HG: CPT | Mod: CPTII,S$GLB,, | Performed by: PHYSICIAN ASSISTANT

## 2022-09-07 PROCEDURE — 1159F MED LIST DOCD IN RCRD: CPT | Mod: CPTII,S$GLB,, | Performed by: PHYSICIAN ASSISTANT

## 2022-09-07 PROCEDURE — 1159F PR MEDICATION LIST DOCUMENTED IN MEDICAL RECORD: ICD-10-PCS | Mod: CPTII,S$GLB,, | Performed by: PHYSICIAN ASSISTANT

## 2022-09-07 PROCEDURE — 3060F POS MICROALBUMINURIA REV: CPT | Mod: CPTII,S$GLB,, | Performed by: PHYSICIAN ASSISTANT

## 2022-09-07 PROCEDURE — 99999 PR PBB SHADOW E&M-EST. PATIENT-LVL V: CPT | Mod: PBBFAC,,, | Performed by: PHYSICIAN ASSISTANT

## 2022-09-07 PROCEDURE — 1160F RVW MEDS BY RX/DR IN RCRD: CPT | Mod: CPTII,S$GLB,, | Performed by: INTERNAL MEDICINE

## 2022-09-07 PROCEDURE — 99215 OFFICE O/P EST HI 40 MIN: CPT | Mod: S$GLB,,, | Performed by: INTERNAL MEDICINE

## 2022-09-07 PROCEDURE — 99999 PR PBB SHADOW E&M-EST. PATIENT-LVL V: ICD-10-PCS | Mod: PBBFAC,,, | Performed by: INTERNAL MEDICINE

## 2022-09-07 PROCEDURE — 3079F PR MOST RECENT DIASTOLIC BLOOD PRESSURE 80-89 MM HG: ICD-10-PCS | Mod: CPTII,S$GLB,, | Performed by: PHYSICIAN ASSISTANT

## 2022-09-07 PROCEDURE — 3046F HEMOGLOBIN A1C LEVEL >9.0%: CPT | Mod: CPTII,S$GLB,, | Performed by: PHYSICIAN ASSISTANT

## 2022-09-07 PROCEDURE — 3060F PR POS MICROALBUMINURIA RESULT DOCUMENTED/REVIEW: ICD-10-PCS | Mod: CPTII,S$GLB,, | Performed by: PHYSICIAN ASSISTANT

## 2022-09-07 PROCEDURE — 3077F SYST BP >= 140 MM HG: CPT | Mod: CPTII,S$GLB,, | Performed by: INTERNAL MEDICINE

## 2022-09-07 PROCEDURE — 3046F PR MOST RECENT HEMOGLOBIN A1C LEVEL > 9.0%: ICD-10-PCS | Mod: CPTII,S$GLB,, | Performed by: INTERNAL MEDICINE

## 2022-09-07 PROCEDURE — 3079F DIAST BP 80-89 MM HG: CPT | Mod: CPTII,S$GLB,, | Performed by: INTERNAL MEDICINE

## 2022-09-07 PROCEDURE — 3060F POS MICROALBUMINURIA REV: CPT | Mod: CPTII,S$GLB,, | Performed by: INTERNAL MEDICINE

## 2022-09-07 PROCEDURE — 1159F PR MEDICATION LIST DOCUMENTED IN MEDICAL RECORD: ICD-10-PCS | Mod: CPTII,S$GLB,, | Performed by: INTERNAL MEDICINE

## 2022-09-07 PROCEDURE — 4010F ACE/ARB THERAPY RXD/TAKEN: CPT | Mod: CPTII,S$GLB,, | Performed by: INTERNAL MEDICINE

## 2022-09-07 PROCEDURE — 4010F PR ACE/ARB THEARPY RXD/TAKEN: ICD-10-PCS | Mod: CPTII,S$GLB,, | Performed by: INTERNAL MEDICINE

## 2022-09-07 PROCEDURE — 99024 POSTOP FOLLOW-UP VISIT: CPT | Mod: S$GLB,,, | Performed by: PHYSICIAN ASSISTANT

## 2022-09-07 PROCEDURE — 99999 PR PBB SHADOW E&M-EST. PATIENT-LVL V: ICD-10-PCS | Mod: PBBFAC,,, | Performed by: PHYSICIAN ASSISTANT

## 2022-09-07 PROCEDURE — 3008F PR BODY MASS INDEX (BMI) DOCUMENTED: ICD-10-PCS | Mod: CPTII,S$GLB,, | Performed by: PHYSICIAN ASSISTANT

## 2022-09-07 PROCEDURE — 3066F NEPHROPATHY DOC TX: CPT | Mod: CPTII,S$GLB,, | Performed by: PHYSICIAN ASSISTANT

## 2022-09-07 PROCEDURE — 99215 PR OFFICE/OUTPT VISIT, EST, LEVL V, 40-54 MIN: ICD-10-PCS | Mod: S$GLB,,, | Performed by: INTERNAL MEDICINE

## 2022-09-07 PROCEDURE — 3008F BODY MASS INDEX DOCD: CPT | Mod: CPTII,S$GLB,, | Performed by: PHYSICIAN ASSISTANT

## 2022-09-07 PROCEDURE — 3077F PR MOST RECENT SYSTOLIC BLOOD PRESSURE >= 140 MM HG: ICD-10-PCS | Mod: CPTII,S$GLB,, | Performed by: INTERNAL MEDICINE

## 2022-09-07 PROCEDURE — 3077F SYST BP >= 140 MM HG: CPT | Mod: CPTII,S$GLB,, | Performed by: PHYSICIAN ASSISTANT

## 2022-09-07 PROCEDURE — 4010F PR ACE/ARB THEARPY RXD/TAKEN: ICD-10-PCS | Mod: CPTII,S$GLB,, | Performed by: PHYSICIAN ASSISTANT

## 2022-09-07 PROCEDURE — 3060F PR POS MICROALBUMINURIA RESULT DOCUMENTED/REVIEW: ICD-10-PCS | Mod: CPTII,S$GLB,, | Performed by: INTERNAL MEDICINE

## 2022-09-07 PROCEDURE — 3066F PR DOCUMENTATION OF TREATMENT FOR NEPHROPATHY: ICD-10-PCS | Mod: CPTII,S$GLB,, | Performed by: INTERNAL MEDICINE

## 2022-09-07 PROCEDURE — 99024 PR POST-OP FOLLOW-UP VISIT: ICD-10-PCS | Mod: S$GLB,,, | Performed by: PHYSICIAN ASSISTANT

## 2022-09-07 RX ORDER — METHOCARBAMOL 750 MG/1
750 TABLET, FILM COATED ORAL 3 TIMES DAILY PRN
Qty: 60 TABLET | Refills: 0 | Status: SHIPPED | OUTPATIENT
Start: 2022-09-07 | End: 2022-10-04 | Stop reason: SDUPTHER

## 2022-09-07 RX ORDER — NIFEDIPINE 90 MG/1
90 TABLET, EXTENDED RELEASE ORAL DAILY
Qty: 90 TABLET | Refills: 3 | Status: SHIPPED | OUTPATIENT
Start: 2022-09-07 | End: 2023-06-20

## 2022-09-07 RX ORDER — OXYCODONE AND ACETAMINOPHEN 5; 325 MG/1; MG/1
1 TABLET ORAL EVERY 8 HOURS PRN
Qty: 60 TABLET | Refills: 0 | Status: SHIPPED | OUTPATIENT
Start: 2022-09-14 | End: 2022-10-04 | Stop reason: SDUPTHER

## 2022-09-07 RX ORDER — DEXTROSE 4 G
1 TABLET,CHEWABLE ORAL 3 TIMES DAILY
Qty: 1 EACH | Refills: 0 | Status: SHIPPED | OUTPATIENT
Start: 2022-09-07 | End: 2024-04-01

## 2022-09-07 RX ORDER — HYDRALAZINE HYDROCHLORIDE 25 MG/1
25 TABLET, FILM COATED ORAL EVERY 12 HOURS
Qty: 180 TABLET | Refills: 3 | Status: SHIPPED | OUTPATIENT
Start: 2022-09-07 | End: 2022-11-01

## 2022-09-07 NOTE — PROGRESS NOTES
[facility]   New Visit Progress Note   Recent Hospital Discharge     PRESENTING HISTORY     Chief Complaint/Reason for Admission:  Follow up Hospital Discharge   PCP: Emanuel Lopez MD    History of Present Illness:  Mr. Vick Gonzalez is a 66 y.o. male who was recently admitted to the hospital.    [facility] Surg  Neurosurgery  Discharge Summary        Patient Name: Vick Gonzalez  MRN: [id number]  Admission Date: 8/22/2022  Hospital Length of Stay: 0 days  Discharge Date and Time: 8/26/2022  2:56 PM  Attending Physician: Titi Christian MD  Discharging Provider: Paige Gilbert PA-C  Primary Care Provider: Emanuel Lopez MD   ___________________________________________________________________    Today:  Presents to [facility] for initial hospital follow up.  Patient admitted for planned cervical spine fusion by neurosurgery team.  Post operative course complicated by dysphagia.  Decadron administered x 48 hoursand patient seen by speech therapy team.  Initially on full liquids and advanced slowly to regular consistency diet.   HTN and DM under poor control, and medications were adjusted by hospital medicine team.  Patient ambulated successfully with physical therapy team.  Cervical spine X ray showed good hardware placement.   Drain discontiniued on post op day 3.   Patient discharged to home.  Home health with PT/OT planned by discharging team, but patient declined these services.     Patient unaccompanied today.  Ambulatory and independent with ADL's.  Reports compliance with all medication with exception of new blood pressure medications which he says were not dispensed (Hydralazine and Procardia XL).  Does not have glucometer and is not checking home blood glucoses.   Freestyle Catherine system prescribed by Endocrine team but patient tells me it wasn't covered by insurance thus never dispensed.     Review of Systems  General ROS: negative for chills, fever or weight loss  Psychological ROS:  negative for hallucination, depression or suicidal ideation  Ophthalmic ROS: negative for blurry vision, photophobia or eye pain  ENT ROS: negative for epistaxis, sore throat or rhinorrhea  Respiratory ROS: no cough, shortness of breath, or wheezing  Cardiovascular ROS: no chest pain or dyspnea on exertion  Gastrointestinal ROS: no abdominal pain, change in bowel habits, or black/ bloody stools  Genito-Urinary ROS: no dysuria, trouble voiding, or hematuria  Musculoskeletal ROS: negative for gait disturbance or muscular weakness  + neck pain   Neurological ROS: no syncope or seizures; no ataxia  Dermatological ROS: negative for pruritis, rash and jaundice      PAST HISTORY:     Past Medical History:   Diagnosis Date    Anemia     Anxiety     Chronic pain syndrome 7/13/2011    CKD (chronic kidney disease), stage III     Diabetes mellitus type II, uncontrolled     Discitis of lumbosacral region 1/16/2015    ED (erectile dysfunction)     Encounter for blood transfusion     Genital herpes     Gout, arthritis     History of alcohol abuse     History of hepatitis C, s/p successful Rx w/ SVR24 (cure) - 5/2018 8/23/2011    Completed 24wks Epclusa + RBV w/SVR12 - 2/2018  -     History of positive PPD, treatment status unknown     Pulmonary granulomas, negative sputum cultures for AFB and indeterminate quantferon test    History of substance abuse     Hypertension     Hypothyroidism     Liver replaced by transplant 8/23/2011    DATE: 12/16/2013  LIVER BIOPSY : REASON:  hep C staging  PATHOLOGY COMMITTEE NOTE/PLAN: :grade  1 / stage 1        Pancreatitis 2016    Peptic ulcer disease        Past Surgical History:   Procedure Laterality Date    ANTERIOR CERVICAL DISCECTOMY W/ FUSION N/A 8/22/2022    Procedure: Procedure: ACDF 4-7 LOS: 4.0 Anesthesia: General Blood: Type & Screen Radiology: C-Arm Microscope: ------- SNS: EMG, SEP, MEP Brace: Cross Bed: Regular Bed, Shoulder Strap Headrest:------ Position: Supine Equipment: [employer];   "Surgeon: Titi Christian MD;  Location: [facility] OR;  Service: Neurosurgery;  Laterality: N/A;  Depuy  confirmed CW 8/19  Neuro monitoring confirmed CW 8/19    CARPAL TUNNEL RELEASE Left 10/29/2021    Procedure: RELEASE, CARPAL TUNNEL;  Surgeon: Jameson Alejo Jr., MD;  Location: [facility] OR;  Service: Orthopedics;  Laterality: Left;    CHOLECYSTECTOMY      DECOMPRESSION OF CERVICAL SPINE BY ANTERIOR APPROACH WITH FUSION  8/22/2022    Procedure: DECOMPRESSION AND FUSION, SPINE, CERVICAL, ANTERIOR APPROACH;  Surgeon: Titi Christian MD;  Location: [facility] OR;  Service: Neurosurgery;;    INJECTION OF JOINT Right 12/2/2019    Procedure: INJECTION, JOINT SI;  Surgeon: Thu Penn MD;  Location: [facility] PAIN MGT;  Service: Pain Management;  Laterality: Right;  RT SI JNT INJ    LIVER TRANSPLANT  06/2010    SPINE SURGERY         Family History   Problem Relation Age of Onset    Cancer Mother     Diabetes Mother     Heart disease Mother     Diabetes Sister     Cancer Maternal Uncle 82        colon CA    Drug abuse Daughter     Melanoma Neg Hx     Psoriasis Neg Hx     Lupus Neg Hx     Eczema Neg Hx     Acne Neg Hx          MEDICATIONS & ALLERGIES:     Current Outpatient Medications on File Prior to Visit   Medication Sig Dispense Refill    albuterol (VENTOLIN HFA) 90 mcg/actuation inhaler Inhale 2 puffs into the lungs every 6 (six) hours as needed for Wheezing or Shortness of Breath. Rescue 8.5 g 1    allopurinoL (ZYLOPRIM) 100 MG tablet TAKE 1 TABLET BY MOUTH TWICE A  tablet 3    aluminum-magnesium hydroxide-simethicone (MAALOX) 200-200-20 mg/5 mL Susp Take 30 mLs by mouth 4 (four) times daily before meals and nightly. 769 mL 0    aspirin 81 MG Chew Take 1 tablet (81 mg total) by mouth once daily. (Patient taking differently: Take 81 mg by mouth once daily. Resume after surgery) 90 tablet 3    BD ULTRA-FINE MENDY PEN NEEDLES 32 gauge x 5/32" Ndle       blood sugar diagnostic (TRUE METRIX GLUCOSE TEST STRIP) Strp USE 3 TIMES " DAILY TO TEST BLOOD GLUCOSE LEVEL 100 strip 11    calcium carbonate-vitamin D3 (CALCIUM 600 WITH VITAMIN D3) 600 mg(1,500mg) -400 unit Chew Take 1 tablet by mouth once daily.       flash glucose sensor (FREESTYLE PALLAVI 2 SENSOR) Kit One sensor every 14 days 2 kit prn    gabapentin (NEURONTIN) 800 MG tablet TAKE 1 TABLET BY MOUTH THREE TIMES A DAY 90 tablet 3    glucose 4 GM chewable tablet Take 4 tablets (16 g total) by mouth as needed for Low blood sugar.  12    glucose 4 GM chewable tablet Take 4 tablets (16 g total) by mouth as needed for Low blood sugar (If having symptoms of blurry vision, palpitations, confusion, shakiness.  Please check sugars and if sugar below 70 please take 4 tablets and re-check sugar everry 15 minutes until sugars are above 70 and symptoms resolve.). 50 tablet 12    hydrALAZINE (APRESOLINE) 25 MG tablet Take 1 tablet (25 mg total) by mouth every 12 (twelve) hours. 60 tablet 0    insulin glargine U-300 conc (TOUJEO MAX U-300 SOLOSTAR) 300 unit/mL (3 mL) insulin pen Inject 40 Units into the skin once daily. 3 mL 11    insulin lispro 100 unit/mL pen Inject 20 Units into the skin 3 (three) times daily with meals. 15 mL 11    lancets 30 gauge Misc 1 lancet by Misc.(Non-Drug; Combo Route) route 4 (four) times daily before meals and nightly. 200 each 11    levothyroxine (SYNTHROID) 88 MCG tablet TAKE 1 TABLET BY MOUTH EVERY DAY (Patient taking differently: Take 88 mcg by mouth before breakfast.) 90 tablet 4    lisinopriL (PRINIVIL,ZESTRIL) 40 MG tablet TAKE 1 TABLET BY MOUTH EVERY DAY 90 tablet 3    methocarbamoL (ROBAXIN) 750 MG Tab Take 1 tablet (750 mg total) by mouth 3 (three) times daily as needed (muscle spasms). 60 tablet 0    metoprolol succinate (TOPROL-XL) 200 MG 24 hr tablet Take 1 tablet (200 mg total) by mouth once daily. 90 tablet 3    multivitamin (THERAGRAN) per tablet Take 1 tablet by mouth once daily.       NIFEdipine (PROCARDIA-XL) 90 MG (OSM) 24 hr tablet Take 1 tablet (90  "mg total) by mouth once daily. 30 tablet 0    NOVOFINE PLUS 32 gauge x 1/6" Ndle       ondansetron (ZOFRAN-ODT) 4 MG TbDL Take 1 tablet (4 mg total) by mouth every 6 (six) hours as needed (Nausea). 15 tablet 0    [START ON 9/14/2022] oxyCODONE-acetaminophen (PERCOCET) 5-325 mg per tablet Take 1 tablet by mouth every 8 (eight) hours as needed for Pain. 60 tablet 0    pen needle, diabetic (BD ULTRA-FINE MENDY PEN NEEDLE) 32 gauge x 5/32" Ndle TEST WITH NOVOLOG THREE TIMES A DAY WITH MEALS AND WITH LEVEMIR AT BEDTIME 100 each 11    rosuvastatin (CRESTOR) 5 MG tablet TAKE 1 TABLET BY MOUTH EVERY DAY 90 tablet 11    sertraline (ZOLOFT) 100 MG tablet TAKE 1 TABLET (100 MG TOTAL) BY MOUTH ONCE DAILY. 30 tablet 7    tacrolimus (PROGRAF) 1 MG Cap TAKE 2 CAPSULES (2 MG TOTAL) BY MOUTH IN THE MORNING & TAKE 1 CAPSULE (1 MG TOTAL) IN THE EVENING. 90 capsule 11    traZODone (DESYREL) 50 MG tablet TAKE 1 TABLET (50 MG TOTAL) BY MOUTH EVERY EVENING. 30 tablet 11    TRUE METRIX GLUCOSE METER Misc TEST 4 (FOUR) TIMES DAILY BEFORE MEALS AND NIGHTLY. 1 each 0    [DISCONTINUED] insulin aspart U-100 (NOVOLOG FLEXPEN U-100 INSULIN) 100 unit/mL (3 mL) InPn pen Inject 20 Units into the skin 3 (three) times daily with meals. 15 mL 11    [DISCONTINUED] methocarbamoL (ROBAXIN) 750 MG Tab Take 1 tablet (750 mg total) by mouth 3 (three) times daily as needed (muscle spasms). 60 tablet 0    [DISCONTINUED] oxyCODONE-acetaminophen (PERCOCET) 5-325 mg per tablet Take 1 tablet by mouth every 8 (eight) hours as needed for Pain. 60 tablet 0     No current facility-administered medications on file prior to visit.        Review of patient's allergies indicates:  No Known Allergies    OBJECTIVE:     Vital Signs:  BP (!) 142/85 (BP Location: Left arm, Patient Position: Sitting, BP Method: Small (Automatic))   Pulse 91   Temp 98.2 °F (36.8 °C) (Oral)   Wt 117.7 kg (259 lb 5.9 oz)   SpO2 97%   BMI 33.30 kg/m²   Wt Readings from Last 3 Encounters: "   09/07/22 0914 118 kg (260 lb 2.3 oz)   08/26/22 0433 118.6 kg (261 lb 7.5 oz)   08/25/22 0012 118.2 kg (260 lb 9.3 oz)   08/24/22 0009 120.3 kg (265 lb 3.4 oz)   08/22/22 2320 122.6 kg (270 lb 4.5 oz)   08/22/22 0652 108.9 kg (240 lb)   08/16/22 1251 120.7 kg (266 lb)     Body mass index is 33.3 kg/m².        Physical Exam:  BP (!) 142/85 (BP Location: Left arm, Patient Position: Sitting, BP Method: Small (Automatic))   Pulse 91   Temp 98.2 °F (36.8 °C) (Oral)   Wt 117.7 kg (259 lb 5.9 oz)   SpO2 97%   BMI 33.30 kg/m²   General appearance: alert, cooperative, no distress  Constitutional:Oriented to person, place, and time  + appears well-developed and well-nourished.   HEENT: Normocephalic, atraumatic, neck symmetrical, no nasal discharge   Eyes: conjunctivae/corneas clear, PERRL, EOM's intact  Lungs: clear to auscultation bilaterally, no dullness to percussion bilaterally  Heart: regular rate and rhythm without rub; no displacement of the PMI   Abdomen: soft, non-tender; bowel sounds normoactive; no organomegaly  Extremities: extremities symmetric; no clubbing, cyanosis, or edema  Integument: Skin color, texture, turgor normal; no rashes; hair distrubution normal + surgical wound anterior neck + neck brace in place   Neurologic: Alert and oriented X 3, normal strength, normal coordination and gait  Psychiatric: no pressured speech; normal affect; no evidence of impaired cognition     Laboratory  Lab Results   Component Value Date    WBC 11.70 08/25/2022    HGB 12.7 (L) 08/25/2022    HCT 38.9 (L) 08/25/2022    MCV 90 08/25/2022     08/25/2022     BMP  Lab Results   Component Value Date     08/25/2022    K 5.1 08/25/2022    CL 99 08/25/2022    CO2 25 08/25/2022    BUN 23 08/25/2022    CREATININE 1.1 08/25/2022    CALCIUM 9.7 08/25/2022    ANIONGAP 13 08/25/2022    ESTGFRAFRICA >60.0 07/25/2022    EGFRNONAA 52.7 (A) 07/25/2022     Lab Results   Component Value Date    ALT 20 08/16/2022    AST 22  08/16/2022     (H) 04/24/2017    ALKPHOS 80 08/16/2022    BILITOT 0.3 08/16/2022     Lab Results   Component Value Date    INR 1.0 07/12/2022    INR 1.0 07/10/2019    INR 1.0 11/01/2017     Lab Results   Component Value Date    HGBA1C 9.7 (H) 08/16/2022     ASSESSMENT & PLAN:     Essential hypertension  - new meds not dispensed at discharge  -  at [facility] retail pharmacy today  - ambulatory monitoring   - sodium restriction   -     hydrALAZINE (APRESOLINE) 25 MG tablet; Take 1 tablet (25 mg total) by mouth every 12 (twelve) hours.  Dispense: 180 tablet; Refill: 3  -     NIFEdipine (PROCARDIA-XL) 90 MG (OSM) 24 hr tablet; Take 1 tablet (90 mg total) by mouth once daily.  Dispense: 90 tablet; Refill: 3    Uncontrolled type 2 diabetes mellitus with hyperglycemia  - HgBA1C 9.7  - Freestyle Catherine system not covered by insurance and not dispensed per patient  - he does not have glucometer and is not checking home blood glucose   -     blood-glucose meter Misc; 1 Device by Misc.(Non-Drug; Combo Route) route 3 (three) times daily.  Dispense: 1 each; Refill: 0  - glucometer order sent to [facility] of fusion of cervical spine  - management per Neurosurgery team     Patient will [facility] released from [facility].  He will see his PCP, Dr Lopez, 11/1/22.  Endocrinology appt requested- awaiting scheduling.       Instructions for the patient:      Scheduled Follow-up :  Future Appointments   Date Time Provider Department Center   10/4/2022  8:45 AM [facility] XR-A LIMIT 350 LBS [facility] XRAY OP [facility]   10/4/2022  9:40 AM Titi Christian MD [facility] NEUROSU [facility]   10/24/2022  9:45 AM [facility] [place]   11/1/2022  2:00 PM Emanuel Lopez MD [facility] FAM MED [facility]   12/7/2022 11:30 AM [facility] XR-A LIMIT 350 LBS [facility] XRAY OP [facility]   12/7/2022  1:30 PM Paige Gilbert PA-C [facility] NEUROSU [facility]   3/6/2023 11:00 AM [facility] OIC-US1 MASTER [facility] ULTR [facility]        Post Visit Medication List:     Medication List            Accurate as of September 7, 2022 10:48 AM. If you have any questions, ask your nurse or doctor.                START taking these medications      blood-glucose meter Misc  1 Device by Misc.(Non-Drug; Combo Route) route 3 (three) times daily.  Started by: Taryn Corcoran MD            CHANGE how you take these medications      aspirin 81 MG Chew  Take 1 tablet (81 mg total) by mouth once daily.  What changed: additional instructions     levothyroxine 88 MCG tablet  Commonly known as: SYNTHROID  TAKE 1 TABLET BY MOUTH EVERY DAY  What changed: when to take this     oxyCODONE-acetaminophen 5-325 mg per tablet  Commonly known as: PERCOCET  Take 1 tablet by mouth every 8 (eight) hours as needed for Pain.  Start taking on: September 14, 2022  What changed: These instructions start on September 14, 2022. If you are unsure what to do until then, ask your doctor or other care provider.  Changed by: Paige Gilbert PA-C            CONTINUE taking these medications      albuterol 90 mcg/actuation inhaler  Commonly known as: VENTOLIN HFA  Inhale 2 puffs into the lungs every 6 (six) hours as needed for Wheezing or Shortness of Breath. Rescue     allopurinoL 100 MG tablet  Commonly known as: ZYLOPRIM  TAKE 1 TABLET BY MOUTH TWICE A DAY     aluminum-magnesium hydroxide-simethicone 200-200-20 mg/5 mL Susp  Commonly known as: MAALOX  Take 30 mLs by mouth 4 (four) times daily before meals and nightly.     blood sugar diagnostic Strp  Commonly known as: TRUE METRIX GLUCOSE TEST STRIP  USE 3 TIMES DAILY TO TEST BLOOD GLUCOSE LEVEL     calcium carbonate-vitamin D3 600 mg-10 mcg (400 unit) Chew  Commonly known as: CALCIUM 600 WITH VITAMIN D3     FREESTYLE PALLAVI 2 SENSOR Kit  Generic drug: flash glucose sensor  One sensor every 14 days     gabapentin 800 MG tablet  Commonly known as: NEURONTIN  TAKE 1 TABLET BY MOUTH THREE TIMES A DAY     * glucose 4 GM chewable  "tablet  Take 4 tablets (16 g total) by mouth as needed for Low blood sugar.     * glucose 4 GM chewable tablet  Take 4 tablets (16 g total) by mouth as needed for Low blood sugar (If having symptoms of blurry vision, palpitations, confusion, shakiness.  Please check sugars and if sugar below 70 please take 4 tablets and re-check sugar everry 15 minutes until sugars are above 70 and symptoms resolve.).     HumaLOG KwikPen Insulin 100 unit/mL pen  Generic drug: insulin lispro  Inject 20 Units into the skin 3 (three) times daily with meals.     hydrALAZINE 25 MG tablet  Commonly known as: APRESOLINE  Take 1 tablet (25 mg total) by mouth every 12 (twelve) hours.     lancets 30 gauge Misc  1 lancet by Misc.(Non-Drug; Combo Route) route 4 (four) times daily before meals and nightly.     lisinopriL 40 MG tablet  Commonly known as: PRINIVIL,ZESTRIL  TAKE 1 TABLET BY MOUTH EVERY DAY     methocarbamoL 750 MG Tab  Commonly known as: ROBAXIN  Take 1 tablet (750 mg total) by mouth 3 (three) times daily as needed (muscle spasms).     metoprolol succinate 200 MG 24 hr tablet  Commonly known as: TOPROL-XL  Take 1 tablet (200 mg total) by mouth once daily.     multivitamin per tablet  Commonly known as: THERAGRAN     NIFEdipine 90 MG (OSM) 24 hr tablet  Commonly known as: PROCARDIA-XL  Take 1 tablet (90 mg total) by mouth once daily.     * NOVOFINE PLUS 32 gauge x 1/6" Ndle  Generic drug: pen needle, diabetic     * BD ULTRA-FINE MENDY PEN NEEDLE 32 gauge x 5/32" Ndle  Generic drug: pen needle, diabetic     * BD ULTRA-FINE MENDY PEN NEEDLE 32 gauge x 5/32" Ndle  Generic drug: pen needle, diabetic  TEST WITH NOVOLOG THREE TIMES A DAY WITH MEALS AND WITH LEVEMIR AT BEDTIME     ondansetron 4 MG Tbdl  Commonly known as: ZOFRAN-ODT  Take 1 tablet (4 mg total) by mouth every 6 (six) hours as needed (Nausea).     rosuvastatin 5 MG tablet  Commonly known as: CRESTOR  TAKE 1 TABLET BY MOUTH EVERY DAY     sertraline 100 MG tablet  Commonly known " as: ZOLOFT  TAKE 1 TABLET (100 MG TOTAL) BY MOUTH ONCE DAILY.     tacrolimus 1 MG Cap  Commonly known as: PROGRAF  TAKE 2 CAPSULES (2 MG TOTAL) BY MOUTH IN THE MORNING & TAKE 1 CAPSULE (1 MG TOTAL) IN THE EVENING.     TOUJEO MAX U-300 SOLOSTAR 300 unit/mL (3 mL) insulin pen  Generic drug: insulin glargine U-300 conc  Inject 40 Units into the skin once daily.     traZODone 50 MG tablet  Commonly known as: DESYREL  TAKE 1 TABLET (50 MG TOTAL) BY MOUTH EVERY EVENING.           * This list has 5 medication(s) that are the same as other medications prescribed for you. Read the directions carefully, and ask your doctor or other care provider to review them with you.                   Where to Get Your Medications        These medications were sent to [facility]/pharmacy #[id number] - [place] - [address]  [address] [address]      Phone: [phone]   hydrALAZINE 25 MG tablet  NIFEdipine 90 MG (OSM) 24 hr tablet       These medications were sent to [facility]  [address] [address]      Hours: Mon-Fri, 8a-5:30p Phone: [phone]   methocarbamoL 750 MG Tab  oxyCODONE-acetaminophen 5-325 mg per tablet       Information about where to get these medications is not yet available    Ask your nurse or doctor about these medications  blood-glucose meter Misc         Signing Physician:  Taryn Corcoran MD

## 2022-09-07 NOTE — PROGRESS NOTES
"Postoperative Wound Check:  Date of Procedure: 8/22/2022         Pre-Operative Diagnosis: Cervical radiculopathy [M54.12]  Cervical spinal stenosis [M48.02]  Cervical myelopathy [G95.9]     Post-Operative Diagnosis: Post-Op Diagnosis Codes:     * Cervical radiculopathy [M54.12]     * Cervical spinal stenosis [M48.02]     * Cervical myelopathy [G95.9]     Anesthesia: General     Procedures performed:  ACDF C4/5, C5/6, C6/7      Surgeon: Titi Christian MD     Assistant:: Yohana Love PA-C, scrubbed and assisting all portions, no resident available.      HPI:    Patient still has some neck pain.  Occasional pain into the left shoulder.  Numbness in the left hand.  He is able to use his hand more but does still have a tightening sensation in the left hand that comes and goes.  He is using a walker and still feels off balance.      He is taking oxycodone and Robaxin 3 times a day.    He is still having some trouble swallowing but this is getting better.    Patient denies any problems with the incisions.  No redness, swelling, or drainage, and no fever, chills, or sweats.      Physical Exam:    Vitals:    09/07/22 0914   BP: (!) 148/88   Pulse: 87   Temp: 98 °F (36.7 °C)   Weight: 118 kg (260 lb 2.3 oz)   Height: 6' 2" (1.88 m)   PainSc:   6         Incision:  Wound edges are approximated.  No redness, swelling, or drainage.      Diagnosis:     1. S/P cervical spinal fusion              Plan:       Orders Placed This Encounter    X-Ray Cervical Spine AP And Lateral    X-Ray Cervical Spine AP And Lateral    oxyCODONE-acetaminophen (PERCOCET) 5-325 mg per tablet    methocarbamoL (ROBAXIN) 750 MG Tab         -cervical spine x-rays show good hardware placement   -refilled Robaxin today   -refilled oxycodone but cannot be filled so September 14th   -continue walking with a walker or while on stable   -okay to discontinue neck brace while sleeping        The patient will follow up with Dr. Christian in 4 weeks for the 6 week po fu " with xrays beforehand.    Paige Gilbert [facility], PA-C  Neurosurgery  [facility]

## 2022-09-13 ENCOUNTER — TELEPHONE (OUTPATIENT)
Dept: PULMONOLOGY | Facility: CLINIC | Age: 67
End: 2022-09-13
Payer: MEDICARE

## 2022-09-13 NOTE — TELEPHONE ENCOUNTER
Called patient to see if he has received his glucose meter. Patient states he has not received it. Assured patient we will fax it over again and advised him to call the clinic back if still has not received it. Patient verbalized understanding.

## 2022-09-14 ENCOUNTER — HOSPITAL ENCOUNTER (EMERGENCY)
Facility: HOSPITAL | Age: 67
Discharge: HOME | End: 2022-09-14
Attending: STUDENT IN AN ORGANIZED HEALTH CARE EDUCATION/TRAINING PROGRAM
Payer: MEDICARE

## 2022-09-14 VITALS
WEIGHT: 240 LBS | DIASTOLIC BLOOD PRESSURE: 76 MMHG | HEART RATE: 81 BPM | TEMPERATURE: 99 F | BODY MASS INDEX: 30.81 KG/M2 | RESPIRATION RATE: 20 BRPM | SYSTOLIC BLOOD PRESSURE: 118 MMHG | OXYGEN SATURATION: 97 %

## 2022-09-14 DIAGNOSIS — Z51.89 VISIT FOR WOUND CHECK: Primary | ICD-10-CM

## 2022-09-14 DIAGNOSIS — Z86.39 HISTORY OF DIABETES MELLITUS: ICD-10-CM

## 2022-09-14 LAB
ALBUMIN SERPL BCP-MCNC: 3.8 G/DL (ref 3.5–5.2)
ALP SERPL-CCNC: 76 U/L (ref 55–135)
ALT SERPL W/O P-5'-P-CCNC: 17 U/L (ref 10–44)
ANION GAP SERPL CALC-SCNC: 9 MMOL/L (ref 8–16)
AST SERPL-CCNC: 15 U/L (ref 10–40)
BASOPHILS # BLD AUTO: 0.02 K/UL (ref 0–0.2)
BASOPHILS NFR BLD: 0.2 % (ref 0–1.9)
BILIRUB SERPL-MCNC: 0.5 MG/DL (ref 0.1–1)
BUN SERPL-MCNC: 23 MG/DL (ref 8–23)
CALCIUM SERPL-MCNC: 9.1 MG/DL (ref 8.7–10.5)
CHLORIDE SERPL-SCNC: 105 MMOL/L (ref 95–110)
CO2 SERPL-SCNC: 26 MMOL/L (ref 23–29)
CREAT SERPL-MCNC: 1.5 MG/DL (ref 0.5–1.4)
CRP SERPL-MCNC: 11.3 MG/L (ref 0–8.2)
DIFFERENTIAL METHOD: ABNORMAL
EOSINOPHIL # BLD AUTO: 0.5 K/UL (ref 0–0.5)
EOSINOPHIL NFR BLD: 6 % (ref 0–8)
ERYTHROCYTE [DISTWIDTH] IN BLOOD BY AUTOMATED COUNT: 13.2 % (ref 11.5–14.5)
ERYTHROCYTE [SEDIMENTATION RATE] IN BLOOD BY WESTERGREN METHOD: 51 MM/HR (ref 0–10)
EST. GFR  (NO RACE VARIABLE): 52 ML/MIN/1.73 M^2
GLUCOSE SERPL-MCNC: 91 MG/DL (ref 70–110)
HCT VFR BLD AUTO: 36.2 % (ref 40–54)
HGB BLD-MCNC: 11.6 G/DL (ref 14–18)
IMM GRANULOCYTES # BLD AUTO: 0.02 K/UL (ref 0–0.04)
IMM GRANULOCYTES NFR BLD AUTO: 0.2 % (ref 0–0.5)
LYMPHOCYTES # BLD AUTO: 2.9 K/UL (ref 1–4.8)
LYMPHOCYTES NFR BLD: 33.4 % (ref 18–48)
MCH RBC QN AUTO: 28.8 PG (ref 27–31)
MCHC RBC AUTO-ENTMCNC: 32 G/DL (ref 32–36)
MCV RBC AUTO: 90 FL (ref 82–98)
MONOCYTES # BLD AUTO: 0.6 K/UL (ref 0.3–1)
MONOCYTES NFR BLD: 7.1 % (ref 4–15)
NEUTROPHILS # BLD AUTO: 4.6 K/UL (ref 1.8–7.7)
NEUTROPHILS NFR BLD: 53.1 % (ref 38–73)
NRBC BLD-RTO: 0 /100 WBC
PLATELET # BLD AUTO: 170 K/UL (ref 150–450)
PMV BLD AUTO: 12.1 FL (ref 9.2–12.9)
POTASSIUM SERPL-SCNC: 4.9 MMOL/L (ref 3.5–5.1)
PROT SERPL-MCNC: 7.3 G/DL (ref 6–8.4)
RBC # BLD AUTO: 4.03 M/UL (ref 4.6–6.2)
SODIUM SERPL-SCNC: 140 MMOL/L (ref 136–145)
WBC # BLD AUTO: 8.6 K/UL (ref 3.9–12.7)

## 2022-09-14 PROCEDURE — 86140 C-REACTIVE PROTEIN: CPT | Performed by: PHYSICIAN ASSISTANT

## 2022-09-14 PROCEDURE — 80053 COMPREHEN METABOLIC PANEL: CPT | Performed by: PHYSICIAN ASSISTANT

## 2022-09-14 PROCEDURE — 99284 EMERGENCY DEPT VISIT MOD MDM: CPT

## 2022-09-14 PROCEDURE — 85652 RBC SED RATE AUTOMATED: CPT | Performed by: PHYSICIAN ASSISTANT

## 2022-09-14 PROCEDURE — 25000003 PHARM REV CODE 250: Performed by: PHYSICIAN ASSISTANT

## 2022-09-14 PROCEDURE — 85025 COMPLETE CBC W/AUTO DIFF WBC: CPT | Performed by: PHYSICIAN ASSISTANT

## 2022-09-14 RX ORDER — HYDROCODONE BITARTRATE AND ACETAMINOPHEN 10; 325 MG/1; MG/1
1 TABLET ORAL
Status: COMPLETED | OUTPATIENT
Start: 2022-09-14 | End: 2022-09-14

## 2022-09-14 RX ORDER — CIPROFLOXACIN 500 MG/1
500 TABLET ORAL 2 TIMES DAILY
Qty: 20 TABLET | Refills: 0 | Status: SHIPPED | OUTPATIENT
Start: 2022-09-14 | End: 2022-09-24

## 2022-09-14 RX ORDER — BACITRACIN ZINC 500 UNIT/G
OINTMENT (GRAM) TOPICAL 2 TIMES DAILY
Qty: 30 G | Refills: 0 | Status: SHIPPED | OUTPATIENT
Start: 2022-09-14 | End: 2023-05-12

## 2022-09-14 RX ADMIN — HYDROCODONE BITARTRATE AND ACETAMINOPHEN 1 TABLET: 10; 325 TABLET ORAL at 01:09

## 2022-09-14 NOTE — ED PROVIDER NOTES
"Encounter Date: 9/14/2022       History     Chief Complaint   Patient presents with    Wound Check     Pt has hx of DM, pt has small wound noted between right 4th and 5th toes, with mild pain reported, +2 DP pulse CBG this am was 110      66-year-old male presents to ED with concern of wound to right foot that began 3 days ago.  No associated injury trauma.  He reports pain is grossly worsened and he reports wound "has a bad smell".  No numbness, focal weakness, fevers, chills, nausea, vomiting.  He is recovering from recent cervical fusion, denying any current complications.  No other acute complaints at this time.    The history is provided by the patient.   Review of patient's allergies indicates:  No Known Allergies  Past Medical History:   Diagnosis Date    Anemia     Anxiety     Chronic pain syndrome 7/13/2011    CKD (chronic kidney disease), stage III     Diabetes mellitus type II, uncontrolled     Discitis of lumbosacral region 1/16/2015    ED (erectile dysfunction)     Encounter for blood transfusion     Genital herpes     Gout, arthritis     History of alcohol abuse     History of hepatitis C, s/p successful Rx w/ SVR24 (cure) - 5/2018 8/23/2011    Completed 24wks Epclusa + RBV w/SVR12 - 2/2018  -     History of positive PPD, treatment status unknown     Pulmonary granulomas, negative sputum cultures for AFB and indeterminate quantferon test    History of substance abuse     Hypertension     Hypothyroidism     Liver replaced by transplant 8/23/2011    DATE: 12/16/2013  LIVER BIOPSY : REASON:  hep C staging  PATHOLOGY COMMITTEE NOTE/PLAN: :grade  1 / stage 1        Pancreatitis 2016    Peptic ulcer disease      Past Surgical History:   Procedure Laterality Date    ANTERIOR CERVICAL DISCECTOMY W/ FUSION N/A 8/22/2022    Procedure: Procedure: ACDF 4-7 LOS: 4.0 Anesthesia: General Blood: Type & Screen Radiology: C-Arm Microscope: ------- SNS: EMG, SEP, MEP Brace: [place] Bed: Regular Bed, Shoulder Strap " Headrest:------ Position: Supine Equipment: Depuy;  Surgeon: Titi Christian MD;  Location: [facility] OR;  Service: Neurosurgery;  Laterality: N/A;  Depuy  confirmed CW 8/19  Neuro monitoring confirmed CW 8/19    CARPAL TUNNEL RELEASE Left 10/29/2021    Procedure: RELEASE, CARPAL TUNNEL;  Surgeon: Jameson Alejo Jr., MD;  Location: [facility] OR;  Service: Orthopedics;  Laterality: Left;    CHOLECYSTECTOMY      DECOMPRESSION OF CERVICAL SPINE BY ANTERIOR APPROACH WITH FUSION  8/22/2022    Procedure: DECOMPRESSION AND FUSION, SPINE, CERVICAL, ANTERIOR APPROACH;  Surgeon: Titi Christian MD;  Location: [facility] OR;  Service: Neurosurgery;;    INJECTION OF JOINT Right 12/2/2019    Procedure: INJECTION, JOINT SI;  Surgeon: Thu Penn MD;  Location: [facility] PAIN MGT;  Service: Pain Management;  Laterality: Right;  RT SI JNT INJ    LIVER TRANSPLANT  06/2010    SPINE SURGERY       Family History   Problem Relation Age of Onset    Cancer Mother     Diabetes Mother     Heart disease Mother     Diabetes Sister     Cancer Maternal Uncle 82        colon CA    Drug abuse Daughter     Melanoma Neg Hx     Psoriasis Neg Hx     Lupus Neg Hx     Eczema Neg Hx     Acne Neg Hx      Social History     Tobacco Use    Smoking status: Former     Passive exposure: Never    Smokeless tobacco: Never   Substance Use Topics    Alcohol use: No     Comment: over 5 years ago, none currently    Drug use: Not Currently     Comment: Former cocaine use     Review of Systems   Constitutional:  Negative for chills and fever.   Gastrointestinal:  Negative for nausea and vomiting.   Skin:  Positive for wound.   Neurological:  Negative for weakness and numbness.     Physical Exam     Initial Vitals [09/14/22 1107]   BP Pulse Resp Temp SpO2   118/76 81 18 98.7 °F (37.1 °C) 97 %      MAP       --         Physical Exam    Nursing note and vitals reviewed.  Constitutional: He appears well-developed and well-nourished. He is active. He does not have a sickly appearance. He  does not appear ill. No distress.   HENT:   Head: Normocephalic and atraumatic.   Neck:   Cervical collar intact.   Pulmonary/Chest: Effort normal.   Musculoskeletal:      Comments: Right foot noted have small wound on lateral surface of right 4th toe.  Tenderness noted to site.  No active drainage.  No significant swelling or erythema.  Sensation intact with appropriate capillary refill.  DP and PT pulses found with Doppler.     Neurological: He is alert. GCS eye subscore is 4. GCS verbal subscore is 5. GCS motor subscore is 6.   Skin: Skin is warm and dry.   Psychiatric: He has a normal mood and affect. His speech is normal and behavior is normal.       ED Course   Procedures  Labs Reviewed   CBC W/ AUTO DIFFERENTIAL - Abnormal; Notable for the following components:       Result Value    RBC 4.03 (*)     Hemoglobin 11.6 (*)     Hematocrit 36.2 (*)     All other components within normal limits   COMPREHENSIVE METABOLIC PANEL - Abnormal; Notable for the following components:    Creatinine 1.5 (*)     eGFR 52 (*)     All other components within normal limits   SEDIMENTATION RATE - Abnormal; Notable for the following components:    Sed Rate 51 (*)     All other components within normal limits   C-REACTIVE PROTEIN          Imaging Results              X-Ray Toe 2 or More Views Right (Final result)  Result time 09/14/22 12:37:49      Final result by Yash Bermeo MD (09/14/22 12:37:49)                   Impression:      As above.      Electronically signed by: Yash Bermeo MD  Date:    09/14/2022  Time:    12:37               Narrative:    EXAMINATION:  XR TOE 2 OR MORE VIEWS RIGHT    CLINICAL HISTORY:  Right 4th toe wound;    TECHNIQUE:  Three views of the right toes were performed    COMPARISON:  None    FINDINGS:  No fracture or dislocation.  No periosteal reaction or erosion.  No soft tissue gas.  Degenerative changes, most prominent at the hallux MTP joint.  Vascular calcifications are present.                                        Medications   HYDROcodone-acetaminophen  mg per tablet 1 tablet (1 tablet Oral Given 9/14/22 1313)     Medical Decision Making:   Initial Assessment:   Patient presents with concern of wound to right 4th toe that he first noticed 4 days ago.  Denies any associated injury or trauma.  Afebrile with vitals WNL.  On exam, small wound noted on lateral aspect of 4th toe with no active drainage or swelling.  Neurovascular intact.  Differential Diagnosis:   Diabetic wound, ulcer, infection, callus, osteomyelitis  ED Management:  CBC, CMP, sed rate, CRP, x-ray right toe    CBC grossly unremarkable no elevated WBC.  CMP unremarkable.  Glucose 91.  Sed rate 51.  CRP pending.    X-ray right 4th toe showing no fracture or dislocation.  No periosteal reaction or erosion.  No soft tissue gas.    Based on current exam, no evidence of deep infection or osteomyelitis.  Will plan to continue with conservative care.  Prescription for Cipro.  Ambulatory referral be sent to Podiatry for continued evaluation and wound management.  Patient encouraged to monitor wound healing very closely with close PCP/podiatry follow-up.  ED return precautions were discussed at length.  Patient states his understanding and agrees with plan.  Other:   I have discussed this case with another health care provider.       <> Summary of the Discussion:   Patient discussed with attending, Dr. Alba, who agrees with ED course and dispo.                        Clinical Impression:   Final diagnoses:  [Z51.89] Visit for wound check (Primary)  [Z86.39] History of diabetes mellitus      ED Disposition Condition    Discharge Stable          ED Prescriptions       Medication Sig Dispense Start Date End Date Auth. Provider    ciprofloxacin HCl (CIPRO) 500 MG tablet Take 1 tablet (500 mg total) by mouth 2 (two) times daily. for 10 days 20 tablet 9/14/2022 9/24/2022 Paco Heck PA-C    bacitracin 500 unit/gram Oint Apply topically 2 (two)  times daily. 30 g 9/14/2022 -- Paco Heck PA-C          Follow-up Information    None          Paco Heck PA-C  09/14/22 0457

## 2022-09-14 NOTE — ED NOTES
Pt ambulated into room 20 from waiting  room. Pt has a c collar in place from recent surgery. Pt here for wound and pain to right foot. Pt has a white area between 4th and 5th toe which pt noticed yesterday with an odor and drainage. Plan of care reviewed, call bell within reach.

## 2022-10-04 ENCOUNTER — OFFICE VISIT (OUTPATIENT)
Dept: NEUROSURGERY | Facility: CLINIC | Age: 67
End: 2022-10-04
Payer: MEDICARE

## 2022-10-04 ENCOUNTER — HOSPITAL ENCOUNTER (OUTPATIENT)
Dept: RADIOLOGY | Facility: HOSPITAL | Age: 67
Discharge: HOME | End: 2022-10-04
Attending: PHYSICIAN ASSISTANT
Payer: MEDICARE

## 2022-10-04 VITALS — TEMPERATURE: 98 F | BODY MASS INDEX: 30.56 KG/M2 | HEIGHT: 74 IN | WEIGHT: 238.13 LBS

## 2022-10-04 DIAGNOSIS — Z98.1 S/P CERVICAL SPINAL FUSION: Primary | ICD-10-CM

## 2022-10-04 DIAGNOSIS — Z98.1 S/P CERVICAL SPINAL FUSION: ICD-10-CM

## 2022-10-04 PROCEDURE — 99999 PR PBB SHADOW E&M-EST. PATIENT-LVL IV: ICD-10-PCS | Mod: PBBFAC,,, | Performed by: NEUROLOGICAL SURGERY

## 2022-10-04 PROCEDURE — 1159F PR MEDICATION LIST DOCUMENTED IN MEDICAL RECORD: ICD-10-PCS | Mod: CPTII,S$GLB,, | Performed by: NEUROLOGICAL SURGERY

## 2022-10-04 PROCEDURE — 3046F PR MOST RECENT HEMOGLOBIN A1C LEVEL > 9.0%: ICD-10-PCS | Mod: CPTII,S$GLB,, | Performed by: NEUROLOGICAL SURGERY

## 2022-10-04 PROCEDURE — 4010F ACE/ARB THERAPY RXD/TAKEN: CPT | Mod: CPTII,S$GLB,, | Performed by: NEUROLOGICAL SURGERY

## 2022-10-04 PROCEDURE — 3060F PR POS MICROALBUMINURIA RESULT DOCUMENTED/REVIEW: ICD-10-PCS | Mod: CPTII,S$GLB,, | Performed by: NEUROLOGICAL SURGERY

## 2022-10-04 PROCEDURE — 3046F HEMOGLOBIN A1C LEVEL >9.0%: CPT | Mod: CPTII,S$GLB,, | Performed by: NEUROLOGICAL SURGERY

## 2022-10-04 PROCEDURE — 3008F BODY MASS INDEX DOCD: CPT | Mod: CPTII,S$GLB,, | Performed by: NEUROLOGICAL SURGERY

## 2022-10-04 PROCEDURE — 4010F PR ACE/ARB THEARPY RXD/TAKEN: ICD-10-PCS | Mod: CPTII,S$GLB,, | Performed by: NEUROLOGICAL SURGERY

## 2022-10-04 PROCEDURE — 3066F PR DOCUMENTATION OF TREATMENT FOR NEPHROPATHY: ICD-10-PCS | Mod: CPTII,S$GLB,, | Performed by: NEUROLOGICAL SURGERY

## 2022-10-04 PROCEDURE — 72040 X-RAY EXAM NECK SPINE 2-3 VW: CPT | Mod: TC,FY

## 2022-10-04 PROCEDURE — 3066F NEPHROPATHY DOC TX: CPT | Mod: CPTII,S$GLB,, | Performed by: NEUROLOGICAL SURGERY

## 2022-10-04 PROCEDURE — 99999 PR PBB SHADOW E&M-EST. PATIENT-LVL IV: CPT | Mod: PBBFAC,,, | Performed by: NEUROLOGICAL SURGERY

## 2022-10-04 PROCEDURE — 72040 X-RAY EXAM NECK SPINE 2-3 VW: CPT | Mod: 26,,, | Performed by: RADIOLOGY

## 2022-10-04 PROCEDURE — 99024 POSTOP FOLLOW-UP VISIT: CPT | Mod: S$GLB,,, | Performed by: NEUROLOGICAL SURGERY

## 2022-10-04 PROCEDURE — 99024 PR POST-OP FOLLOW-UP VISIT: ICD-10-PCS | Mod: S$GLB,,, | Performed by: NEUROLOGICAL SURGERY

## 2022-10-04 PROCEDURE — 3060F POS MICROALBUMINURIA REV: CPT | Mod: CPTII,S$GLB,, | Performed by: NEUROLOGICAL SURGERY

## 2022-10-04 PROCEDURE — 1159F MED LIST DOCD IN RCRD: CPT | Mod: CPTII,S$GLB,, | Performed by: NEUROLOGICAL SURGERY

## 2022-10-04 PROCEDURE — 3008F PR BODY MASS INDEX (BMI) DOCUMENTED: ICD-10-PCS | Mod: CPTII,S$GLB,, | Performed by: NEUROLOGICAL SURGERY

## 2022-10-04 PROCEDURE — 72040 XR CERVICAL SPINE AP LATERAL: ICD-10-PCS | Mod: 26,,, | Performed by: RADIOLOGY

## 2022-10-04 RX ORDER — OXYCODONE AND ACETAMINOPHEN 5; 325 MG/1; MG/1
1 TABLET ORAL EVERY 8 HOURS PRN
Qty: 21 TABLET | Refills: 0 | Status: SHIPPED | OUTPATIENT
Start: 2022-10-04 | End: 2022-10-04 | Stop reason: SDUPTHER

## 2022-10-04 RX ORDER — METHOCARBAMOL 750 MG/1
750 TABLET, FILM COATED ORAL 3 TIMES DAILY PRN
Qty: 21 TABLET | Refills: 0 | Status: SHIPPED | OUTPATIENT
Start: 2022-10-04 | End: 2022-10-04 | Stop reason: SDUPTHER

## 2022-10-04 RX ORDER — OXYCODONE AND ACETAMINOPHEN 5; 325 MG/1; MG/1
1 TABLET ORAL EVERY 8 HOURS PRN
Qty: 21 TABLET | Refills: 0 | Status: SHIPPED | OUTPATIENT
Start: 2022-10-04 | End: 2022-12-07 | Stop reason: SDUPTHER

## 2022-10-04 RX ORDER — METHOCARBAMOL 750 MG/1
750 TABLET, FILM COATED ORAL 3 TIMES DAILY PRN
Qty: 21 TABLET | Refills: 0 | OUTPATIENT
Start: 2022-10-04 | End: 2022-11-08

## 2022-10-04 NOTE — PROGRESS NOTES
POV    6 weeks c/o ACDF C4/5, C5/6, C6/7     He reports continued left hand numbness, but no more radicular pain    Has some neck pain on the right, does not radiate to shoulder, no numbness    No trouble w swallowing or incision    PE:   4+, otherwise 5/5  Some left central and ulnar hand numbness  Incision well healed      A/P: OK to lift 35 lbs  Wear collar when active  Plan for next appt to start PT if still c/o neck pain  May need EMG left arm if numbness persists  Refilled pain medication

## 2022-10-04 NOTE — TELEPHONE ENCOUNTER
----- Message from Raul Moseley sent at 10/4/2022 10:23 AM CDT -----  Contact: pt  .Type:  Needs Medical Advice    Who Called: pt  Pharmacy name and phone #:  [facility]   Phone: [phone]  Fax:  [phone]  Would the patient rather a call back or a response via [facility]? Call back  Best Call Back Number: [phone]   Additional Information: Pt. Needs his prescriptions for  oxyCODONE-acetaminophen (PERCOCET) 5-325 mg per tablet and methocarbamoL (ROBAXIN) 750 MG Tab Sent to the [facility].  He is downstairs waiting they were sent to the  wrong pharmacy.

## 2022-10-06 ENCOUNTER — HOSPITAL ENCOUNTER (EMERGENCY)
Facility: HOSPITAL | Age: 67
Discharge: HOME | End: 2022-10-06
Attending: STUDENT IN AN ORGANIZED HEALTH CARE EDUCATION/TRAINING PROGRAM
Payer: MEDICARE

## 2022-10-06 VITALS
TEMPERATURE: 98 F | SYSTOLIC BLOOD PRESSURE: 138 MMHG | RESPIRATION RATE: 20 BRPM | OXYGEN SATURATION: 97 % | HEART RATE: 75 BPM | DIASTOLIC BLOOD PRESSURE: 72 MMHG

## 2022-10-06 DIAGNOSIS — M54.16 LUMBAR RADICULOPATHY: ICD-10-CM

## 2022-10-06 DIAGNOSIS — M43.10 ACQUIRED SPONDYLOLISTHESIS: ICD-10-CM

## 2022-10-06 DIAGNOSIS — M54.10 RADICULOPATHY, UNSPECIFIED SPINAL REGION: Primary | ICD-10-CM

## 2022-10-06 DIAGNOSIS — G89.4 CHRONIC PAIN SYNDROME: ICD-10-CM

## 2022-10-06 DIAGNOSIS — M96.1 POSTLAMINECTOMY SYNDROME OF LUMBAR REGION: ICD-10-CM

## 2022-10-06 DIAGNOSIS — F11.90 CHRONIC, CONTINUOUS USE OF OPIOIDS: ICD-10-CM

## 2022-10-06 PROCEDURE — 99283 EMERGENCY DEPT VISIT LOW MDM: CPT

## 2022-10-06 PROCEDURE — 25000003 PHARM REV CODE 250: Performed by: STUDENT IN AN ORGANIZED HEALTH CARE EDUCATION/TRAINING PROGRAM

## 2022-10-06 RX ORDER — METHOCARBAMOL 500 MG/1
1000 TABLET, FILM COATED ORAL
Status: COMPLETED | OUTPATIENT
Start: 2022-10-06 | End: 2022-10-06

## 2022-10-06 RX ORDER — GABAPENTIN 600 MG/1
1000 TABLET ORAL 3 TIMES DAILY
Qty: 30 TABLET | Refills: 0 | Status: SHIPPED | OUTPATIENT
Start: 2022-10-06 | End: 2022-11-01

## 2022-10-06 RX ADMIN — METHOCARBAMOL 1000 MG: 500 TABLET ORAL at 08:10

## 2022-10-07 NOTE — ED PROVIDER NOTES
Encounter Date: 10/6/2022       History     Chief Complaint   Patient presents with    Numbness     Cervical spine surgery post op 1 month ago and has had left hand numbness and pain since the surgery. He reported he has told surgeron and waiting for xray, but the pain is not tolerable     66-year-old male with past medical history of cervical radiculopathy status post ACDF C4-C7, chronic neuropathy, CKD, liver transplant and hypertension who presents with left forearm pain that is been ongoing for years.  Patient reports he was told after his surgery 2 months ago and assumed the pain will go away but it has persisted.  He states the pain is sharp and radiates from his shoulder down to his 5th digit on the left. Reports continued numbness to his hand as well.  He reports taking hydrocodone and gabapentin without any relief of his pain.  Denies any trauma to the region.  Denies any fevers or chills. Denies worsening neck pain or stiffness.  He reports at times lifting his hand above his head helps with the pain.  Patient reports being frustrated that he is still suffering from this pain despite multiple surgeries.      Review of patient's allergies indicates:  No Known Allergies  Past Medical History:   Diagnosis Date    Anemia     Anxiety     Chronic pain syndrome 7/13/2011    CKD (chronic kidney disease), stage III     Diabetes mellitus type II, uncontrolled     Discitis of lumbosacral region 1/16/2015    ED (erectile dysfunction)     Encounter for blood transfusion     Genital herpes     Gout, arthritis     History of alcohol abuse     History of hepatitis C, s/p successful Rx w/ SVR24 (cure) - 5/2018 8/23/2011    Completed 24wks Epclusa + RBV w/SVR12 - 2/2018  -     History of positive PPD, treatment status unknown     Pulmonary granulomas, negative sputum cultures for AFB and indeterminate quantferon test    History of substance abuse     Hypertension     Hypothyroidism     Liver replaced by transplant 8/23/2011     DATE: 12/16/2013  LIVER BIOPSY : REASON:  hep C staging  PATHOLOGY COMMITTEE NOTE/PLAN: :grade  1 / stage 1        Pancreatitis 2016    Peptic ulcer disease      Past Surgical History:   Procedure Laterality Date    ANTERIOR CERVICAL DISCECTOMY W/ FUSION N/A 8/22/2022    Procedure: Procedure: ACDF 4-7 LOS: 4.0 Anesthesia: General Blood: Type & Screen Radiology: C-Arm Microscope: ------- SNS: EMG, SEP, MEP Brace: [place] Bed: Regular Bed, Shoulder Strap Headrest:------ Position: Supine Equipment: Depuy;  Surgeon: Titi Christian MD;  Location: [facility] OR;  Service: Neurosurgery;  Laterality: N/A;  Depuy  confirmed CW 8/19  Neuro monitoring confirmed CW 8/19    CARPAL TUNNEL RELEASE Left 10/29/2021    Procedure: RELEASE, CARPAL TUNNEL;  Surgeon: Jameson Alejo Jr., MD;  Location: [facility] OR;  Service: Orthopedics;  Laterality: Left;    CHOLECYSTECTOMY      DECOMPRESSION OF CERVICAL SPINE BY ANTERIOR APPROACH WITH FUSION  8/22/2022    Procedure: DECOMPRESSION AND FUSION, SPINE, CERVICAL, ANTERIOR APPROACH;  Surgeon: Titi Christian MD;  Location: [facility] OR;  Service: Neurosurgery;;    INJECTION OF JOINT Right 12/2/2019    Procedure: INJECTION, JOINT SI;  Surgeon: Thu Penn MD;  Location: [facility] PAIN MGT;  Service: Pain Management;  Laterality: Right;  RT SI JNT INJ    LIVER TRANSPLANT  06/2010    SPINE SURGERY       Family History   Problem Relation Age of Onset    Cancer Mother     Diabetes Mother     Heart disease Mother     Diabetes Sister     Cancer Maternal Uncle 82        colon CA    Drug abuse Daughter     Melanoma Neg Hx     Psoriasis Neg Hx     Lupus Neg Hx     Eczema Neg Hx     Acne Neg Hx      Social History     Tobacco Use    Smoking status: Former     Passive exposure: Never    Smokeless tobacco: Never   Substance Use Topics    Alcohol use: No     Comment: over 5 years ago, none currently    Drug use: Not Currently     Comment: Former cocaine use     Review of Systems   Constitutional:  Negative for chills  and fever.   HENT:  Negative for congestion and rhinorrhea.    Eyes:  Negative for pain.   Respiratory:  Negative for cough and shortness of breath.    Cardiovascular:  Negative for chest pain and leg swelling.   Gastrointestinal:  Negative for abdominal pain, nausea and vomiting.   Endocrine: Negative for polyuria.   Genitourinary:  Negative for dysuria and hematuria.   Musculoskeletal:  Positive for myalgias. Negative for gait problem and neck pain.   Skin:  Negative for rash.   Allergic/Immunologic: Negative for immunocompromised state.   Neurological:  Positive for dizziness. Negative for weakness and headaches.     Physical Exam     Initial Vitals [10/06/22 1822]   BP Pulse Resp Temp SpO2   138/72 75 20 98 °F (36.7 °C) 97 %      MAP       --         Physical Exam    Constitutional: He appears well-developed and well-nourished. He is not diaphoretic. No distress.   HENT:   Head: Normocephalic and atraumatic.   Eyes: Conjunctivae and EOM are normal. Pupils are equal, round, and reactive to light.   Neck: Neck supple. No Brudzinski's sign and no Kernig's sign noted. No JVD present.   Surgical scar in anterior neck without erythema or signs of infection     Full passive range of motion without pain.     Cardiovascular:  Normal rate and regular rhythm.           Pulses:       Radial pulses are 2+ on the right side and 2+ on the left side.        Posterior tibial pulses are 2+ on the right side and 2+ on the left side.   Pulmonary/Chest: Breath sounds normal. No respiratory distress.   Abdominal: Abdomen is soft. Bowel sounds are normal. He exhibits no distension. There is no abdominal tenderness. There is no rebound and no guarding.   Musculoskeletal:         General: No tenderness or edema. Normal range of motion.      Right shoulder: Normal.      Left shoulder: Normal. No tenderness, bony tenderness or crepitus.      Right upper arm: Normal.      Left upper arm: No swelling, deformity or bony tenderness.       Right elbow: Normal.      Left elbow: No swelling or deformity.      Right forearm: Normal.      Left forearm: No tenderness or bony tenderness.      Right hand: Normal.      Left hand: No swelling or deformity. Decreased strength.      Cervical back: Full passive range of motion without pain and neck supple.      Comments: 4/5  strength of left 5/5 on right. Numbness to lateral aspect of the hand.      Lymphadenopathy:     He has no cervical adenopathy.   Neurological: He is alert and oriented to person, place, and time.   Skin: Skin is warm. Capillary refill takes less than 2 seconds.   Psychiatric: He has a normal mood and affect.       ED Course   Procedures  Labs Reviewed - No data to display       Imaging Results    None          Medications   methocarbamoL tablet 1,000 mg (1,000 mg Oral Given 10/6/22 2015)     Medical Decision Making:   Initial Assessment:   66-year-old male with past medical history of cervical radiculopathy status post ACDF C4-C7, chronic neuropathy, CKD, liver transplant and hypertension who presents with left forearm pain that is been ongoing for years.  Patient no acute distress.  Exam notable for decreased  strength on the left compared to right.  Numbness in the distal hand in the ulnar distribution.  Per review of his chart patient's exam baseline.  No systemic symptoms such as fevers suggest any infectious etiology for his pain.  Furthermore no trauma to the region to suggest any osseous abnormalities.  Seems patient's pain is secondary to chronic radiculopathy and neuropathy.  Currently no indication for blood work or imaging.  Will give patient Robaxin and increase gabapentin dose.  Laboratory referral to pain clinic placed.  Patient to follow-up with neurosurgery and physical therapy.  Patient stable for discharge at this time.  Return precautions and anticipatory guidance given.                        Clinical Impression:   Final diagnoses:  [M54.10] Radiculopathy,  unspecified spinal region (Primary)      ED Disposition Condition    Discharge           ED Prescriptions       Medication Sig Dispense Start Date End Date Auth. Provider    gabapentin (NEURONTIN) 600 MG tablet Take 1.5 tablets (900 mg total) by mouth 3 (three) times daily. 30 tablet 10/6/2022 -- Ruth Ann Trinidad MD          Follow-up Information       Follow up With Specialties Details Why Contact Info    Emanuel Lopez MD Family Medicine Schedule an appointment as soon as possible for a visit  for reassessment and further management [address]  [address]  [facility] [address] [phone]      pain clinic  Schedule an appointment as soon as possible for a visit  for management of chronic pain              Ruth Ann Trinidad MD  10/07/22 1148

## 2022-10-07 NOTE — ED NOTES
Presents with ongoing numbness and pain down left arm, recent cspine surgery a month ago to fix same, but has had no relief, has been in contact with surgeon and is waiting xrays, but feels can not tolerate pain anymore.    Airway patent,  Breathing spontaneously, maintaining oxygen saturations above 95% on room air,   Heart rate within in normal limits, normotensive  Alert and orientated  Afebrile, moderate to severe reports of deep seeded pain in fingertip radiating up into neck.    RS RN

## 2022-10-07 NOTE — DISCHARGE INSTRUCTIONS
Thank you for coming to our Emergency Department today. It is important to remember that some problems are difficult to diagnose and may not be found during your first visit. Be sure to follow up with your primary care doctor and review any labs/imaging that was performed with them. If you do not have a primary care doctor, you may contact the one listed on your discharge paperwork or you may also call the [facility] Appointment Desk at [phone] to schedule an appointment with one.     All medications may potentially have side effects and it is impossible to predict which medications may give you side effects. If you feel that you are having a negative effect of any medication you should immediately stop taking them and seek medical attention.    Return to the ER with any questions/concerns, new/concerning symptoms, worsening or failure to improve. Do not drive or make any important decisions for 24 hours if you have received any pain medications, sedatives or mood altering drugs during your ER visit.

## 2022-10-10 ENCOUNTER — PATIENT MESSAGE (OUTPATIENT)
Dept: ADMINISTRATIVE | Facility: HOSPITAL | Age: 67
End: 2022-10-10
Payer: MEDICARE

## 2022-10-13 ENCOUNTER — TELEPHONE (OUTPATIENT)
Dept: ADMINISTRATIVE | Facility: OTHER | Age: 67
End: 2022-10-13
Payer: MEDICARE

## 2022-10-24 ENCOUNTER — LAB VISIT (OUTPATIENT)
Dept: LAB | Facility: HOSPITAL | Age: 67
End: 2022-10-24
Attending: INTERNAL MEDICINE
Payer: MEDICARE

## 2022-10-24 DIAGNOSIS — Z94.4 S/P LIVER TRANSPLANT: ICD-10-CM

## 2022-10-24 LAB
ALBUMIN SERPL BCP-MCNC: 3.9 G/DL (ref 3.5–5.2)
ALP SERPL-CCNC: 73 U/L (ref 55–135)
ALT SERPL W/O P-5'-P-CCNC: 26 U/L (ref 10–44)
ANION GAP SERPL CALC-SCNC: 13 MMOL/L (ref 8–16)
AST SERPL-CCNC: 22 U/L (ref 10–40)
BASOPHILS # BLD AUTO: 0.03 K/UL (ref 0–0.2)
BASOPHILS NFR BLD: 0.3 % (ref 0–1.9)
BILIRUB SERPL-MCNC: 0.3 MG/DL (ref 0.1–1)
BUN SERPL-MCNC: 17 MG/DL (ref 8–23)
CALCIUM SERPL-MCNC: 9.7 MG/DL (ref 8.7–10.5)
CHLORIDE SERPL-SCNC: 102 MMOL/L (ref 95–110)
CO2 SERPL-SCNC: 23 MMOL/L (ref 23–29)
CREAT SERPL-MCNC: 1.2 MG/DL (ref 0.5–1.4)
DIFFERENTIAL METHOD: ABNORMAL
EOSINOPHIL # BLD AUTO: 0.5 K/UL (ref 0–0.5)
EOSINOPHIL NFR BLD: 5.4 % (ref 0–8)
ERYTHROCYTE [DISTWIDTH] IN BLOOD BY AUTOMATED COUNT: 13.8 % (ref 11.5–14.5)
EST. GFR  (NO RACE VARIABLE): >60 ML/MIN/1.73 M^2
GLUCOSE SERPL-MCNC: 203 MG/DL (ref 70–110)
HCT VFR BLD AUTO: 37.7 % (ref 40–54)
HGB BLD-MCNC: 11.9 G/DL (ref 14–18)
IMM GRANULOCYTES # BLD AUTO: 0.05 K/UL (ref 0–0.04)
IMM GRANULOCYTES NFR BLD AUTO: 0.6 % (ref 0–0.5)
LYMPHOCYTES # BLD AUTO: 2.7 K/UL (ref 1–4.8)
LYMPHOCYTES NFR BLD: 30.1 % (ref 18–48)
MCH RBC QN AUTO: 29.2 PG (ref 27–31)
MCHC RBC AUTO-ENTMCNC: 31.6 G/DL (ref 32–36)
MCV RBC AUTO: 92 FL (ref 82–98)
MONOCYTES # BLD AUTO: 0.7 K/UL (ref 0.3–1)
MONOCYTES NFR BLD: 8.3 % (ref 4–15)
NEUTROPHILS # BLD AUTO: 4.9 K/UL (ref 1.8–7.7)
NEUTROPHILS NFR BLD: 55.3 % (ref 38–73)
NRBC BLD-RTO: 0 /100 WBC
PLATELET # BLD AUTO: 159 K/UL (ref 150–450)
PMV BLD AUTO: 13.9 FL (ref 9.2–12.9)
POTASSIUM SERPL-SCNC: 4.8 MMOL/L (ref 3.5–5.1)
PROT SERPL-MCNC: 7.4 G/DL (ref 6–8.4)
RBC # BLD AUTO: 4.08 M/UL (ref 4.6–6.2)
SODIUM SERPL-SCNC: 138 MMOL/L (ref 136–145)
WBC # BLD AUTO: 8.84 K/UL (ref 3.9–12.7)

## 2022-10-24 PROCEDURE — 85025 COMPLETE CBC W/AUTO DIFF WBC: CPT | Performed by: INTERNAL MEDICINE

## 2022-10-24 PROCEDURE — 80197 ASSAY OF TACROLIMUS: CPT | Performed by: INTERNAL MEDICINE

## 2022-10-24 PROCEDURE — 36415 COLL VENOUS BLD VENIPUNCTURE: CPT | Mod: PO | Performed by: INTERNAL MEDICINE

## 2022-10-24 PROCEDURE — 80053 COMPREHEN METABOLIC PANEL: CPT | Performed by: INTERNAL MEDICINE

## 2022-10-25 LAB — TACROLIMUS BLD-MCNC: 11.1 NG/ML (ref 5–15)

## 2022-10-26 ENCOUNTER — TELEPHONE (OUTPATIENT)
Dept: TRANSPLANT | Facility: CLINIC | Age: 67
End: 2022-10-26
Payer: MEDICARE

## 2022-10-26 NOTE — LETTER
October 26, 2022    Vick Gonzalez  [address] [address]          Dear Vick Gonzalez:  MRN: [id number]    This is a follow up to your recent labs, your lab results were stable.  There are no medicine changes.  Please have your labs drawn again on 1/23/23.      If you cannot have your labs drawn on the scheduled date, it is your responsibility to call the transplant department to reschedule.  Please call [phone] and ask to speak to [facility]  for all scheduling requests.     Sincerely,      Kadi [facility]  [address] [place] [id number]  [phone]

## 2022-10-26 NOTE — TELEPHONE ENCOUNTER
Letter sent, labs stable and no medication changes are needed. Repeat labs due 1/23/23 per protocol.  ----- Message from James Coleman MD sent at 10/25/2022  3:26 PM CDT -----  Results reviewed

## 2022-10-27 DIAGNOSIS — Z94.4 S/P LIVER TRANSPLANT: Primary | ICD-10-CM

## 2022-10-27 DIAGNOSIS — K74.69 OTHER CIRRHOSIS OF LIVER: ICD-10-CM

## 2022-11-01 ENCOUNTER — OFFICE VISIT (OUTPATIENT)
Dept: FAMILY MEDICINE | Facility: CLINIC | Age: 67
End: 2022-11-01
Payer: MEDICARE

## 2022-11-01 VITALS
BODY MASS INDEX: 33.75 KG/M2 | SYSTOLIC BLOOD PRESSURE: 130 MMHG | WEIGHT: 263 LBS | HEIGHT: 74 IN | TEMPERATURE: 98 F | DIASTOLIC BLOOD PRESSURE: 80 MMHG | OXYGEN SATURATION: 96 % | HEART RATE: 98 BPM

## 2022-11-01 DIAGNOSIS — R06.2 WHEEZING: ICD-10-CM

## 2022-11-01 DIAGNOSIS — M96.1 POSTLAMINECTOMY SYNDROME OF LUMBAR REGION: ICD-10-CM

## 2022-11-01 DIAGNOSIS — R05.9 COUGH: ICD-10-CM

## 2022-11-01 DIAGNOSIS — M54.16 LUMBAR RADICULOPATHY: ICD-10-CM

## 2022-11-01 DIAGNOSIS — R06.02 SOB (SHORTNESS OF BREATH): ICD-10-CM

## 2022-11-01 DIAGNOSIS — F11.90 CHRONIC, CONTINUOUS USE OF OPIOIDS: ICD-10-CM

## 2022-11-01 DIAGNOSIS — G89.4 CHRONIC PAIN SYNDROME: ICD-10-CM

## 2022-11-01 DIAGNOSIS — B35.3 TINEA PEDIS, UNSPECIFIED LATERALITY: ICD-10-CM

## 2022-11-01 DIAGNOSIS — E11.65 UNCONTROLLED TYPE 2 DIABETES MELLITUS WITH HYPERGLYCEMIA: Primary | ICD-10-CM

## 2022-11-01 DIAGNOSIS — M43.10 ACQUIRED SPONDYLOLISTHESIS: ICD-10-CM

## 2022-11-01 PROCEDURE — 3079F DIAST BP 80-89 MM HG: CPT | Mod: CPTII,S$GLB,, | Performed by: FAMILY MEDICINE

## 2022-11-01 PROCEDURE — 3060F PR POS MICROALBUMINURIA RESULT DOCUMENTED/REVIEW: ICD-10-PCS | Mod: CPTII,S$GLB,, | Performed by: FAMILY MEDICINE

## 2022-11-01 PROCEDURE — 3008F BODY MASS INDEX DOCD: CPT | Mod: CPTII,S$GLB,, | Performed by: FAMILY MEDICINE

## 2022-11-01 PROCEDURE — 3066F PR DOCUMENTATION OF TREATMENT FOR NEPHROPATHY: ICD-10-PCS | Mod: CPTII,S$GLB,, | Performed by: FAMILY MEDICINE

## 2022-11-01 PROCEDURE — 4010F PR ACE/ARB THEARPY RXD/TAKEN: ICD-10-PCS | Mod: CPTII,S$GLB,, | Performed by: FAMILY MEDICINE

## 2022-11-01 PROCEDURE — 3075F SYST BP GE 130 - 139MM HG: CPT | Mod: CPTII,S$GLB,, | Performed by: FAMILY MEDICINE

## 2022-11-01 PROCEDURE — 3060F POS MICROALBUMINURIA REV: CPT | Mod: CPTII,S$GLB,, | Performed by: FAMILY MEDICINE

## 2022-11-01 PROCEDURE — 1159F MED LIST DOCD IN RCRD: CPT | Mod: CPTII,S$GLB,, | Performed by: FAMILY MEDICINE

## 2022-11-01 PROCEDURE — 99215 PR OFFICE/OUTPT VISIT, EST, LEVL V, 40-54 MIN: ICD-10-PCS | Mod: S$GLB,,, | Performed by: FAMILY MEDICINE

## 2022-11-01 PROCEDURE — 99999 PR PBB SHADOW E&M-EST. PATIENT-LVL V: CPT | Mod: PBBFAC,,, | Performed by: FAMILY MEDICINE

## 2022-11-01 PROCEDURE — 3066F NEPHROPATHY DOC TX: CPT | Mod: CPTII,S$GLB,, | Performed by: FAMILY MEDICINE

## 2022-11-01 PROCEDURE — 90686 IIV4 VACC NO PRSV 0.5 ML IM: CPT | Mod: S$GLB,,, | Performed by: FAMILY MEDICINE

## 2022-11-01 PROCEDURE — 3008F PR BODY MASS INDEX (BMI) DOCUMENTED: ICD-10-PCS | Mod: CPTII,S$GLB,, | Performed by: FAMILY MEDICINE

## 2022-11-01 PROCEDURE — 90686 FLU VACCINE (QUAD) GREATER THAN OR EQUAL TO 3YO PRESERVATIVE FREE IM: ICD-10-PCS | Mod: S$GLB,,, | Performed by: FAMILY MEDICINE

## 2022-11-01 PROCEDURE — 3046F PR MOST RECENT HEMOGLOBIN A1C LEVEL > 9.0%: ICD-10-PCS | Mod: CPTII,S$GLB,, | Performed by: FAMILY MEDICINE

## 2022-11-01 PROCEDURE — 99499 UNLISTED E&M SERVICE: CPT | Mod: S$GLB,,, | Performed by: FAMILY MEDICINE

## 2022-11-01 PROCEDURE — G0008 FLU VACCINE (QUAD) GREATER THAN OR EQUAL TO 3YO PRESERVATIVE FREE IM: ICD-10-PCS | Mod: S$GLB,,, | Performed by: FAMILY MEDICINE

## 2022-11-01 PROCEDURE — 99999 PR PBB SHADOW E&M-EST. PATIENT-LVL V: ICD-10-PCS | Mod: PBBFAC,,, | Performed by: FAMILY MEDICINE

## 2022-11-01 PROCEDURE — 99499 RISK ADDL DX/OHS AUDIT: ICD-10-PCS | Mod: S$GLB,,, | Performed by: FAMILY MEDICINE

## 2022-11-01 PROCEDURE — 3046F HEMOGLOBIN A1C LEVEL >9.0%: CPT | Mod: CPTII,S$GLB,, | Performed by: FAMILY MEDICINE

## 2022-11-01 PROCEDURE — G0008 ADMIN INFLUENZA VIRUS VAC: HCPCS | Mod: S$GLB,,, | Performed by: FAMILY MEDICINE

## 2022-11-01 PROCEDURE — 99215 OFFICE O/P EST HI 40 MIN: CPT | Mod: S$GLB,,, | Performed by: FAMILY MEDICINE

## 2022-11-01 PROCEDURE — 3079F PR MOST RECENT DIASTOLIC BLOOD PRESSURE 80-89 MM HG: ICD-10-PCS | Mod: CPTII,S$GLB,, | Performed by: FAMILY MEDICINE

## 2022-11-01 PROCEDURE — 3075F PR MOST RECENT SYSTOLIC BLOOD PRESS GE 130-139MM HG: ICD-10-PCS | Mod: CPTII,S$GLB,, | Performed by: FAMILY MEDICINE

## 2022-11-01 PROCEDURE — 1159F PR MEDICATION LIST DOCUMENTED IN MEDICAL RECORD: ICD-10-PCS | Mod: CPTII,S$GLB,, | Performed by: FAMILY MEDICINE

## 2022-11-01 PROCEDURE — 4010F ACE/ARB THERAPY RXD/TAKEN: CPT | Mod: CPTII,S$GLB,, | Performed by: FAMILY MEDICINE

## 2022-11-01 RX ORDER — ALBUTEROL SULFATE 90 UG/1
2 AEROSOL, METERED RESPIRATORY (INHALATION) EVERY 6 HOURS PRN
Qty: 8.5 G | Refills: 1 | Status: SHIPPED | OUTPATIENT
Start: 2022-11-01 | End: 2023-05-26 | Stop reason: SDUPTHER

## 2022-11-01 RX ORDER — GABAPENTIN 800 MG/1
800 TABLET ORAL 3 TIMES DAILY
Qty: 90 TABLET | Refills: 11 | Status: SHIPPED | OUTPATIENT
Start: 2022-11-01 | End: 2024-02-08 | Stop reason: SDUPTHER

## 2022-11-01 RX ORDER — CLOTRIMAZOLE 1 %
CREAM (GRAM) TOPICAL 2 TIMES DAILY
Qty: 30 G | Refills: 1 | Status: SHIPPED | OUTPATIENT
Start: 2022-11-01 | End: 2023-02-15

## 2022-11-01 NOTE — TELEPHONE ENCOUNTER
Care Due:                  Date            Visit Type   Department     Provider  --------------------------------------------------------------------------------                                ESTABLISHED   [facility] FAM[facility]  Last Visit: 11-      PATIENT      MEDICINE       Emanuel Lopez  Next Visit: None Scheduled  None         None Found                                                            Last  Test          Frequency    Reason                     Performed    Due Date  --------------------------------------------------------------------------------    Lipid Panel.  12 months..  rosuvastatin.............  01- 01-    Uric Acid...  12 months..  allopurinoL..............  Not Found    Overdue    [employer] Embedded Care Gaps. Reference number: [id number]. 11/01/2022   2:48:44 PM CDT

## 2022-11-01 NOTE — PROGRESS NOTES
(Portions of this note were dictated using voice recognition software and may contain dictation related errors in spelling/grammar/syntax not found on text review)    CC:   Chief Complaint   Patient presents with    Follow-up         HPI: 66 y.o. male     Last visit for cervical spine fusion preop.  Had surgery 08/22/2022. Last NS visit 10/4/22. Went to ED 10/6/22. Worsening pain from let shoulder down to left hand/5th finger. Increased has gabapentin to 900 mg tid. Given robaxin.  Last cervical spine imaging from 10/04/2022 shows post hardware appearance no interval detrimental change.  Surgeon: Dr. Titi Christian           Other Medical history as below  Diabetes with peripheral neuropathy:  Complicated by  dietary noncompliance now followed by endocrinology (last seen January 2022), was on Trulicity but not currently taking.  His current regimen of diabetes was adjusted to glargine (toujeo) 40 units, NovoLog to up to 20 units t.i.d. with meals.  Was believed that a lot of his hyperglycemia issues prior were relating to dietary and insulin noncompliance.   .  A1c has increased to 9.7 (aug 2022).  However, recently states that he has been checking his sugars and is getting usually readings around 90s in the morning and 130s to 140s if taken 2 hours postprandial.  Compliant with the above medication therapy.  Concerned about weight gain was if he can take something lose weight.  Was on G LP 1 agonist therapy but I believe it was  advised stay off this given history of pancreatitis (seems be related to alcohol use in the past but does not drink longer)    Hypertension on lisinopril 40 mg daily, metoprolol 200 mg daily, nifedipine 60 mg daily.  BP stable, started on hydralazine 25 mg in hosp d/t elevated bp but not taking right now.      Dyslipidemia on Crestor 5mg daily.     Hypothyroidism:  Synthroid 88mcg.       Anxiety on Zoloft 100 mg daily.       Liver Transplant secondary to end-stage liver disease hepatitis C  and prior alcohol abuse.:  Followed by hepatology.  On Prograf.   fibroscan showed stage II fibrosis     Chronic pain, followed by pain management.  Was On oxycodone along with his gabapentin.  However failed drug test with positive cocaine and Soma.  History of cocaine abuse,     pain management had recommended continuing with gabapentin and interventional management if desired. Had been on opiods following his surgery.  Upcoming neuro surgery appointment scheduled for 12/07/2022    Had seen ED for right foot drainage, was given Cipro and mupirocin.  Feels like did not help.  Notices it between his 4th and 5th digits.      Has chronic bilateral neuropathy secondary to his prior back pains.  Was concerned that he noticed bit more right foot weakness since his neck surgery.            Past Medical History:   Diagnosis Date    Anemia     Anxiety     Chronic pain syndrome 7/13/2011    CKD (chronic kidney disease), stage III     Diabetes mellitus type II, uncontrolled     Discitis of lumbosacral region 1/16/2015    ED (erectile dysfunction)     Encounter for blood transfusion     Genital herpes     Gout, arthritis     History of alcohol abuse     History of hepatitis C, s/p successful Rx w/ SVR24 (cure) - 5/2018 8/23/2011    Completed 24wks Epclusa + RBV w/SVR12 - 2/2018  -     History of positive PPD, treatment status unknown     Pulmonary granulomas, negative sputum cultures for AFB and indeterminate quantferon test    History of substance abuse     Hypertension     Hypothyroidism     Liver replaced by transplant 8/23/2011    DATE: 12/16/2013  LIVER BIOPSY : REASON:  hep C staging  PATHOLOGY COMMITTEE NOTE/PLAN: :grade  1 / stage 1        Pancreatitis 2016    Peptic ulcer disease        Past Surgical History:   Procedure Laterality Date    ANTERIOR CERVICAL DISCECTOMY W/ FUSION N/A 8/22/2022    Procedure: Procedure: ACDF 4-7 LOS: 4.0 Anesthesia: General Blood: Type & Screen Radiology: C-Arm Microscope: ------- SNS:  EMG, SEP, MEP Brace: [place] Bed: Regular Bed, Shoulder Strap Headrest:------ Position: Supine Equipment: Depuy;  Surgeon: Titi Christian MD;  Location: [facility] OR;  Service: Neurosurgery;  Laterality: N/A;  Depuy  confirmed CW 8/19  Neuro monitoring confirmed CW 8/19    CARPAL TUNNEL RELEASE Left 10/29/2021    Procedure: RELEASE, CARPAL TUNNEL;  Surgeon: Jameson Alejo Jr., MD;  Location: [facility] OR;  Service: Orthopedics;  Laterality: Left;    CHOLECYSTECTOMY      DECOMPRESSION OF CERVICAL SPINE BY ANTERIOR APPROACH WITH FUSION  8/22/2022    Procedure: DECOMPRESSION AND FUSION, SPINE, CERVICAL, ANTERIOR APPROACH;  Surgeon: Titi Christian MD;  Location: [facility] OR;  Service: Neurosurgery;;    INJECTION OF JOINT Right 12/2/2019    Procedure: INJECTION, JOINT SI;  Surgeon: Thu Penn MD;  Location: [facility] PAIN [facility]GT;  Service: Pain Management;  Laterality: Right;  RT SI JNT INJ    LIVER TRANSPLANT  06/2010    SPINE SURGERY         Family History   Problem Relation Age of Onset    Cancer Mother     Diabetes Mother     Heart disease Mother     Diabetes Sister     Cancer Maternal Uncle 82        colon CA    Drug abuse Daughter     Melanoma Neg Hx     Psoriasis Neg Hx     Lupus Neg Hx     Eczema Neg Hx     Acne Neg Hx        Social History     Tobacco Use    Smoking status: Former     Passive exposure: Never    Smokeless tobacco: Never   Substance Use Topics    Alcohol use: No     Comment: over 5 years ago, none currently    Drug use: Not Currently     Comment: Former cocaine use       Lab Results   Component Value Date    WBC 8.84 10/24/2022    HGB 11.9 (L) 10/24/2022    HCT 37.7 (L) 10/24/2022    MCV 92 10/24/2022     10/24/2022    CHOL 113 (L) 01/25/2022    TRIG 153 (H) 01/25/2022    HDL 30 (L) 01/25/2022    ALT 26 10/24/2022    AST 22 10/24/2022    BILITOT 0.3 10/24/2022    ALKPHOS 73 10/24/2022     10/24/2022    K 4.8 10/24/2022     10/24/2022    CREATININE 1.2 10/24/2022    ESTGFRAFRICA >60.0 07/25/2022  "   EGFRNONAA 52.7 (A) 07/25/2022    EGFRNORACEVR >60.0 10/24/2022    CALCIUM 9.7 10/24/2022    ALBUMIN 3.9 10/24/2022    BUN 17 10/24/2022    CO2 23 10/24/2022    TSH 2.026 01/25/2022    PSA 0.22 01/19/2022    PSADIAG 0.28 10/09/2017    INR 1.0 07/12/2022    HGBA1C 9.7 (H) 08/16/2022    MICALBCREAT 30.8 (H) 08/16/2022    LDLCALC 52.4 (L) 01/25/2022     (H) 10/24/2022    ABSEPEEI16SE 30 10/11/2021             Hemoglobin A1C (%)   Date Value   08/16/2022 9.7 (H)   01/25/2022 7.4 (H)   01/24/2022 7.4 (H)   10/11/2021 10.1 (H)   11/11/2020 9.1 (H)   09/01/2020 11.9 (H)   01/23/2020 8.7 (H)   10/04/2019 7.4 (H)   06/03/2019 9.2 (H)   02/25/2019 9.9 (H)           Vital signs reviewed  Vitals:    11/01/22 1358   BP: 130/80   BP Location: Right arm   Patient Position: Sitting   BP Method: Medium (Manual)   Pulse: 98   Temp: 98.2 °F (36.8 °C)   SpO2: 96%   Weight: 119.3 kg (263 lb 0.1 oz)   Height: 6' 2" (1.88 m)         Wt Readings from Last 4 Encounters:   11/01/22 119.3 kg (263 lb 0.1 oz)   10/04/22 108 kg (238 lb 1.6 oz)   09/14/22 108.9 kg (240 lb)   09/07/22 117.7 kg (259 lb 5.9 oz)       PE:   APPEARANCE: Well nourished, well developed, in no acute distress.    HEAD: Normocephalic, atraumatic.  EYES:   Conjunctivae noninjected.  NECK: Supple with no cervical lymphadenopathy.    CHEST: Good inspiratory effort. Lungs clear to auscultation with no wheezes or crackles.  CARDIOVASCULAR: Normal S1, S2. No rubs, murmurs, or gallops.  ABDOMEN: Bowel sounds normal. Not distended. Soft. No tenderness or masses. No organomegaly.  EXTREMITIES:  Ankle edema 1+ bilaterally   DIABETIC FOOT EXAM: Protective Sensation (w/ 10 gram monofilament):  Right: Absent  Left: Absent    Visual Inspection:  Onychomycosis -  Bilateral.  Tinea pedis noted between 4th and 5th digits of right foot.  No other infectious lesions noted.    Pedal Pulses:   Right: Diminished  Left: Diminished    Posterior tibialis:   Right:Diminished  Left: " Diminished    MSK:  Bilateral 5/5 hip flexion against resistance.  Bilateral quad strength 5/5, bilateral hamstring strength 4+/5.  Left foot dorsiflexion and plantar flexion is 5/5 .  Right foot dorsiflexion is 4+/5 with some very slight early fatigue, plantar flexion is 5/5.          IMPRESSION  1. Uncontrolled type 2 diabetes mellitus with hyperglycemia    2. Acquired spondylolisthesis    3. Postlaminectomy syndrome of lumbar region    4. Lumbar radiculopathy    5. Chronic pain syndrome    6. Chronic, continuous use of opioids    7. Tinea pedis, unspecified laterality              PLAN  Orders Placed This Encounter   Procedures    Influenza - Quadrivalent *Preferred* (6 months+) (PF)    Comprehensive Metabolic Panel    Hemoglobin A1C    CBC Auto Differential    Ambulatory referral/consult to Endocrinology    Ambulatory referral/consult to Optometry    Ambulatory referral/consult to Podiatry      Medications Ordered This Encounter   Medications    clotrimazole (LOTRIMIN) 1 % cream     Sig: Apply topically 2 (two) times daily.     Dispense:  30 g     Refill:  1    gabapentin (NEURONTIN) 800 MG tablet     Sig: Take 1 tablet (800 mg total) by mouth 3 (three) times daily.     Dispense:  90 tablet     Refill:  11         Blood pressure stable    Diabetes  : Continue glargine 40 daily and NovoLog 20 units t.i.d. with meals.  Continue blood sugar monitoring.  Update labs next month.  Has not seen his   endocrinologist since January, will try to secure a follow-up appointment  Eye exam:  Needs optometry follow-up   Does request podiatry follow-up as well given foot issues, needing nail care especially given his chronic diabetes and neuropathy    Was prior on 800 mg t.i.d. gabapentin.  ER changes prescription but 1 that ran out he states that the pharmacy canceled as original prescription.  This was refilled to the pharmacy.  Could consider taking a 4th gabapentin pill (3200 mg total, max. ).  Continue to follow up with  neuro surgery /pain management regarding any other further pain management issues workup or treatment.     Total time spent including face-to-face time and chart time: 41 min     SCREENINGS   Colonoscopy 2015, return in 10 years   prostate testing 1/19/22 PSA     Immunizations   Pneumovax 2019  PCV 10/29/2018   Flu:   Today   COVID vaccine x 3   Hepatitis B series up-to-date   Tdap 2021   Can get zoster vaccine at pharmacy

## 2022-11-01 NOTE — PATIENT INSTRUCTIONS
Blood pressure stable. Continue current meds  Diabetes: we'll see if we can get you back with the diabetes dr since it has been a while. For now continue glargine insulin 40 u daily and the novolog 20 u with meals. Goal fasting sugar less than 140, 2 hour after meal sugar less than 180. Notify if sugars are getting into the 60s or less. One option that may potentially be helpful maybe for wt loss is a pill called Jardiance but talk to your diabetes dr about that.  Re\-sent the gabapentin 800 mg three times daily dose--make sure to follow up with your neurosurgeon in case pain is not improving. Could try to take a fourth gabapentin pill to see if this helps.   Eye exam referral.

## 2022-11-02 RX ORDER — ALBUTEROL SULFATE 90 UG/1
AEROSOL, METERED RESPIRATORY (INHALATION)
Refills: 1 | OUTPATIENT
Start: 2022-11-02

## 2022-11-02 NOTE — TELEPHONE ENCOUNTER
Refill Decision Note   Vick Gonzalez  is requesting a refill authorization.  Brief Assessment and Rationale for Refill:  Quick Discontinue     Medication Therapy Plan:  Receipt confirmed by pharmacy (11/1/2022  3:09 PM CDT)    Medication Reconciliation Completed: No   Comments:     No Care Gaps recommended.     Note composed:12:21 PM 11/02/2022

## 2022-11-02 NOTE — TELEPHONE ENCOUNTER
No new care gaps identified.  [facility] Embedded Care Gaps. Reference number: [id number]. 11/01/2022   8:28:29 PM CDT

## 2022-11-08 ENCOUNTER — HOSPITAL ENCOUNTER (EMERGENCY)
Facility: HOSPITAL | Age: 67
Discharge: HOME | End: 2022-11-08
Attending: EMERGENCY MEDICINE
Payer: MEDICARE

## 2022-11-08 VITALS
SYSTOLIC BLOOD PRESSURE: 110 MMHG | OXYGEN SATURATION: 98 % | BODY MASS INDEX: 33.38 KG/M2 | WEIGHT: 260 LBS | RESPIRATION RATE: 18 BRPM | TEMPERATURE: 98 F | DIASTOLIC BLOOD PRESSURE: 57 MMHG | HEART RATE: 75 BPM
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
## 2022-11-08 DIAGNOSIS — S39.012A STRAIN OF LUMBAR REGION, INITIAL ENCOUNTER: ICD-10-CM

## 2022-11-08 DIAGNOSIS — S16.1XXA STRAIN OF NECK MUSCLE, INITIAL ENCOUNTER: ICD-10-CM

## 2022-11-08 PROCEDURE — 25000003 PHARM REV CODE 250: Performed by: PHYSICIAN ASSISTANT

## 2022-11-08 PROCEDURE — 99284 EMERGENCY DEPT VISIT MOD MDM: CPT | Mod: 25

## 2022-11-08 RX ORDER — METHOCARBAMOL 500 MG/1
500 TABLET, FILM COATED ORAL 3 TIMES DAILY
Qty: 21 TABLET | Refills: 0 | Status: SHIPPED | OUTPATIENT
Start: 2022-11-08 | End: 2022-11-13

## 2022-11-08 RX ORDER — METHOCARBAMOL 500 MG/1
500 TABLET, FILM COATED ORAL
Status: COMPLETED | OUTPATIENT
Start: 2022-11-08 | End: 2022-11-08

## 2022-11-08 RX ORDER — LIDOCAINE 50 MG/G
1 PATCH TOPICAL ONCE
Status: DISCONTINUED | OUTPATIENT
Start: 2022-11-08 | End: 2022-11-08 | Stop reason: HOSPADM

## 2022-11-08 RX ORDER — LIDOCAINE 50 MG/G
1 PATCH TOPICAL DAILY
Qty: 5 PATCH | Refills: 0 | Status: SHIPPED | OUTPATIENT
Start: 2022-11-08 | End: 2023-05-12 | Stop reason: ALTCHOICE

## 2022-11-08 RX ADMIN — LIDOCAINE 1 PATCH: 50 PATCH TOPICAL at 05:11

## 2022-11-08 RX ADMIN — METHOCARBAMOL TABLETS 500 MG: 500 TABLET, COATED ORAL at 05:11
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
## 2022-11-11 DIAGNOSIS — Z94.4 S/P LIVER TRANSPLANT: Primary | ICD-10-CM

## 2022-11-11 RX ORDER — TACROLIMUS 1 MG/1
CAPSULE ORAL
Qty: 90 CAPSULE | Refills: 2 | Status: SHIPPED | OUTPATIENT
Start: 2022-11-11 | End: 2023-01-13

## 2022-11-11 RX ORDER — TACROLIMUS 1 MG/1
CAPSULE ORAL
Qty: 90 CAPSULE | Refills: 11 | Status: CANCELLED | OUTPATIENT
Start: 2022-11-11

## 2022-11-16 ENCOUNTER — TELEPHONE (OUTPATIENT)
Dept: TRANSPLANT | Facility: CLINIC | Age: 67
End: 2022-11-16
Payer: MEDICARE

## 2022-11-16 NOTE — TELEPHONE ENCOUNTER
----- Message from Vandana Hernandez sent at 11/16/2022 11:41 AM CST -----  Regarding: Appt  Contact: Cely Gonzalez (Spouse)  Pt wife is calling to schedule appt with Dr. Coleman.    [phone]

## 2022-11-29 ENCOUNTER — TELEPHONE (OUTPATIENT)
Dept: NEUROSURGERY | Facility: CLINIC | Age: 67
End: 2022-11-29
Payer: MEDICARE

## 2022-12-05 ENCOUNTER — LAB VISIT (OUTPATIENT)
Dept: LAB | Facility: HOSPITAL | Age: 67
End: 2022-12-05
Attending: FAMILY MEDICINE
Payer: MEDICARE

## 2022-12-05 DIAGNOSIS — M54.16 LUMBAR RADICULOPATHY: ICD-10-CM

## 2022-12-05 DIAGNOSIS — M96.1 POSTLAMINECTOMY SYNDROME OF LUMBAR REGION: ICD-10-CM

## 2022-12-05 DIAGNOSIS — M43.10 ACQUIRED SPONDYLOLISTHESIS: ICD-10-CM

## 2022-12-05 DIAGNOSIS — E11.65 UNCONTROLLED TYPE 2 DIABETES MELLITUS WITH HYPERGLYCEMIA: ICD-10-CM

## 2022-12-05 DIAGNOSIS — G89.4 CHRONIC PAIN SYNDROME: ICD-10-CM

## 2022-12-05 DIAGNOSIS — F11.90 CHRONIC, CONTINUOUS USE OF OPIOIDS: ICD-10-CM

## 2022-12-05 DIAGNOSIS — B35.3 TINEA PEDIS, UNSPECIFIED LATERALITY: ICD-10-CM

## 2022-12-05 LAB
ALBUMIN SERPL BCP-MCNC: 3.8 G/DL (ref 3.5–5.2)
ALP SERPL-CCNC: 73 U/L (ref 55–135)
ALT SERPL W/O P-5'-P-CCNC: 27 U/L (ref 10–44)
ANION GAP SERPL CALC-SCNC: 9 MMOL/L (ref 8–16)
AST SERPL-CCNC: 24 U/L (ref 10–40)
BASOPHILS # BLD AUTO: 0.03 K/UL (ref 0–0.2)
BASOPHILS NFR BLD: 0.4 % (ref 0–1.9)
BILIRUB SERPL-MCNC: 0.3 MG/DL (ref 0.1–1)
BUN SERPL-MCNC: 20 MG/DL (ref 8–23)
CALCIUM SERPL-MCNC: 9.3 MG/DL (ref 8.7–10.5)
CHLORIDE SERPL-SCNC: 106 MMOL/L (ref 95–110)
CO2 SERPL-SCNC: 24 MMOL/L (ref 23–29)
CREAT SERPL-MCNC: 1.3 MG/DL (ref 0.5–1.4)
DIFFERENTIAL METHOD: ABNORMAL
EOSINOPHIL # BLD AUTO: 0.5 K/UL (ref 0–0.5)
EOSINOPHIL NFR BLD: 6.2 % (ref 0–8)
ERYTHROCYTE [DISTWIDTH] IN BLOOD BY AUTOMATED COUNT: 13.9 % (ref 11.5–14.5)
EST. GFR  (NO RACE VARIABLE): >60 ML/MIN/1.73 M^2
ESTIMATED AVG GLUCOSE: 177 MG/DL (ref 68–131)
GLUCOSE SERPL-MCNC: 171 MG/DL (ref 70–110)
HBA1C MFR BLD: 7.8 % (ref 4–5.6)
HCT VFR BLD AUTO: 37 % (ref 40–54)
HGB BLD-MCNC: 12 G/DL (ref 14–18)
IMM GRANULOCYTES # BLD AUTO: 0.02 K/UL (ref 0–0.04)
IMM GRANULOCYTES NFR BLD AUTO: 0.2 % (ref 0–0.5)
LYMPHOCYTES # BLD AUTO: 3.3 K/UL (ref 1–4.8)
LYMPHOCYTES NFR BLD: 39.2 % (ref 18–48)
MCH RBC QN AUTO: 29.2 PG (ref 27–31)
MCHC RBC AUTO-ENTMCNC: 32.4 G/DL (ref 32–36)
MCV RBC AUTO: 90 FL (ref 82–98)
MONOCYTES # BLD AUTO: 0.7 K/UL (ref 0.3–1)
MONOCYTES NFR BLD: 8.1 % (ref 4–15)
NEUTROPHILS # BLD AUTO: 3.9 K/UL (ref 1.8–7.7)
NEUTROPHILS NFR BLD: 45.9 % (ref 38–73)
NRBC BLD-RTO: 0 /100 WBC
PLATELET # BLD AUTO: 137 K/UL (ref 150–450)
PMV BLD AUTO: 13.3 FL (ref 9.2–12.9)
POTASSIUM SERPL-SCNC: 4.1 MMOL/L (ref 3.5–5.1)
PROT SERPL-MCNC: 7.3 G/DL (ref 6–8.4)
RBC # BLD AUTO: 4.11 M/UL (ref 4.6–6.2)
SODIUM SERPL-SCNC: 139 MMOL/L (ref 136–145)
WBC # BLD AUTO: 8.41 K/UL (ref 3.9–12.7)

## 2022-12-05 PROCEDURE — 85025 COMPLETE CBC W/AUTO DIFF WBC: CPT | Performed by: FAMILY MEDICINE

## 2022-12-05 PROCEDURE — 36415 COLL VENOUS BLD VENIPUNCTURE: CPT | Mod: PO | Performed by: FAMILY MEDICINE

## 2022-12-05 PROCEDURE — 80053 COMPREHEN METABOLIC PANEL: CPT | Performed by: FAMILY MEDICINE

## 2022-12-05 PROCEDURE — 83036 HEMOGLOBIN GLYCOSYLATED A1C: CPT | Performed by: FAMILY MEDICINE

## 2022-12-07 ENCOUNTER — TELEPHONE (OUTPATIENT)
Dept: NEUROSURGERY | Facility: CLINIC | Age: 67
End: 2022-12-07

## 2022-12-07 ENCOUNTER — HOSPITAL ENCOUNTER (OUTPATIENT)
Dept: RADIOLOGY | Facility: HOSPITAL | Age: 67
Discharge: HOME | End: 2022-12-07
Attending: PHYSICIAN ASSISTANT
Payer: MEDICARE

## 2022-12-07 ENCOUNTER — OFFICE VISIT (OUTPATIENT)
Dept: NEUROSURGERY | Facility: CLINIC | Age: 67
End: 2022-12-07
Payer: MEDICARE

## 2022-12-07 VITALS
BODY MASS INDEX: 33.36 KG/M2 | HEART RATE: 74 BPM | SYSTOLIC BLOOD PRESSURE: 129 MMHG | HEIGHT: 74 IN | DIASTOLIC BLOOD PRESSURE: 76 MMHG | WEIGHT: 259.94 LBS | TEMPERATURE: 98 F

## 2022-12-07 DIAGNOSIS — R20.0 NUMBNESS OF LEFT HAND: ICD-10-CM

## 2022-12-07 DIAGNOSIS — Z98.1 S/P CERVICAL SPINAL FUSION: ICD-10-CM

## 2022-12-07 DIAGNOSIS — R29.898 LEFT HAND WEAKNESS: ICD-10-CM

## 2022-12-07 DIAGNOSIS — Z98.1 S/P CERVICAL SPINAL FUSION: Primary | ICD-10-CM

## 2022-12-07 DIAGNOSIS — G89.29 CHRONIC BILATERAL LOW BACK PAIN WITHOUT SCIATICA: Primary | ICD-10-CM

## 2022-12-07 DIAGNOSIS — M54.50 CHRONIC BILATERAL LOW BACK PAIN WITHOUT SCIATICA: Primary | ICD-10-CM

## 2022-12-07 PROCEDURE — 1160F RVW MEDS BY RX/DR IN RCRD: CPT | Mod: CPTII,S$GLB,, | Performed by: PHYSICIAN ASSISTANT

## 2022-12-07 PROCEDURE — 99214 PR OFFICE/OUTPT VISIT, EST, LEVL IV, 30-39 MIN: ICD-10-PCS | Mod: S$GLB,,, | Performed by: PHYSICIAN ASSISTANT

## 2022-12-07 PROCEDURE — 3051F PR MOST RECENT HEMOGLOBIN A1C LEVEL 7.0 - < 8.0%: ICD-10-PCS | Mod: CPTII,S$GLB,, | Performed by: PHYSICIAN ASSISTANT

## 2022-12-07 PROCEDURE — 99999 PR PBB SHADOW E&M-EST. PATIENT-LVL V: ICD-10-PCS | Mod: PBBFAC,,, | Performed by: PHYSICIAN ASSISTANT

## 2022-12-07 PROCEDURE — 3066F NEPHROPATHY DOC TX: CPT | Mod: CPTII,S$GLB,, | Performed by: PHYSICIAN ASSISTANT

## 2022-12-07 PROCEDURE — 3060F POS MICROALBUMINURIA REV: CPT | Mod: CPTII,S$GLB,, | Performed by: PHYSICIAN ASSISTANT

## 2022-12-07 PROCEDURE — 3008F BODY MASS INDEX DOCD: CPT | Mod: CPTII,S$GLB,, | Performed by: PHYSICIAN ASSISTANT

## 2022-12-07 PROCEDURE — 3078F PR MOST RECENT DIASTOLIC BLOOD PRESSURE < 80 MM HG: ICD-10-PCS | Mod: CPTII,S$GLB,, | Performed by: PHYSICIAN ASSISTANT

## 2022-12-07 PROCEDURE — 72040 X-RAY EXAM NECK SPINE 2-3 VW: CPT | Mod: TC,FY

## 2022-12-07 PROCEDURE — 3051F HG A1C>EQUAL 7.0%<8.0%: CPT | Mod: CPTII,S$GLB,, | Performed by: PHYSICIAN ASSISTANT

## 2022-12-07 PROCEDURE — 99999 PR PBB SHADOW E&M-EST. PATIENT-LVL V: CPT | Mod: PBBFAC,,, | Performed by: PHYSICIAN ASSISTANT

## 2022-12-07 PROCEDURE — 99214 OFFICE O/P EST MOD 30 MIN: CPT | Mod: S$GLB,,, | Performed by: PHYSICIAN ASSISTANT

## 2022-12-07 PROCEDURE — 72040 X-RAY EXAM NECK SPINE 2-3 VW: CPT | Mod: 26,,, | Performed by: RADIOLOGY

## 2022-12-07 PROCEDURE — 3066F PR DOCUMENTATION OF TREATMENT FOR NEPHROPATHY: ICD-10-PCS | Mod: CPTII,S$GLB,, | Performed by: PHYSICIAN ASSISTANT

## 2022-12-07 PROCEDURE — 1159F PR MEDICATION LIST DOCUMENTED IN MEDICAL RECORD: ICD-10-PCS | Mod: CPTII,S$GLB,, | Performed by: PHYSICIAN ASSISTANT

## 2022-12-07 PROCEDURE — 4010F PR ACE/ARB THEARPY RXD/TAKEN: ICD-10-PCS | Mod: CPTII,S$GLB,, | Performed by: PHYSICIAN ASSISTANT

## 2022-12-07 PROCEDURE — 3074F PR MOST RECENT SYSTOLIC BLOOD PRESSURE < 130 MM HG: ICD-10-PCS | Mod: CPTII,S$GLB,, | Performed by: PHYSICIAN ASSISTANT

## 2022-12-07 PROCEDURE — 72040 XR CERVICAL SPINE AP LATERAL: ICD-10-PCS | Mod: 26,,, | Performed by: RADIOLOGY

## 2022-12-07 PROCEDURE — 3008F PR BODY MASS INDEX (BMI) DOCUMENTED: ICD-10-PCS | Mod: CPTII,S$GLB,, | Performed by: PHYSICIAN ASSISTANT

## 2022-12-07 PROCEDURE — 1160F PR REVIEW ALL MEDS BY PRESCRIBER/CLIN PHARMACIST DOCUMENTED: ICD-10-PCS | Mod: CPTII,S$GLB,, | Performed by: PHYSICIAN ASSISTANT

## 2022-12-07 PROCEDURE — 3074F SYST BP LT 130 MM HG: CPT | Mod: CPTII,S$GLB,, | Performed by: PHYSICIAN ASSISTANT

## 2022-12-07 PROCEDURE — 4010F ACE/ARB THERAPY RXD/TAKEN: CPT | Mod: CPTII,S$GLB,, | Performed by: PHYSICIAN ASSISTANT

## 2022-12-07 PROCEDURE — 3060F PR POS MICROALBUMINURIA RESULT DOCUMENTED/REVIEW: ICD-10-PCS | Mod: CPTII,S$GLB,, | Performed by: PHYSICIAN ASSISTANT

## 2022-12-07 PROCEDURE — 1159F MED LIST DOCD IN RCRD: CPT | Mod: CPTII,S$GLB,, | Performed by: PHYSICIAN ASSISTANT

## 2022-12-07 PROCEDURE — 3078F DIAST BP <80 MM HG: CPT | Mod: CPTII,S$GLB,, | Performed by: PHYSICIAN ASSISTANT

## 2022-12-07 RX ORDER — OXYCODONE AND ACETAMINOPHEN 5; 325 MG/1; MG/1
1 TABLET ORAL EVERY 8 HOURS PRN
Qty: 21 TABLET | Refills: 0 | Status: SHIPPED | OUTPATIENT
Start: 2022-12-07 | End: 2023-01-13
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
## 2022-12-08 NOTE — TELEPHONE ENCOUNTER
Please schedule lumbar xrays and fu with Dr. Christian after the EMG/NCS is done per Dr. Christian.    Thanks,  Paige Gilbert, [facility], PA-C  Neurosurgery  [facility]  12/08/2022     Patient seen and exam today at bedside. Chart, labs, vitals, radiology reviewed. Above H&P reviewed and edited where appropriate.  Agree with history and physical exam. Agree with assessment and plan.

## 2022-12-29 RX ORDER — INSULIN ASPART 100 [IU]/ML
20 INJECTION, SOLUTION INTRAVENOUS; SUBCUTANEOUS
Qty: 15 ML | Refills: 11 | Status: CANCELLED | OUTPATIENT
Start: 2022-12-29

## 2022-12-29 NOTE — TELEPHONE ENCOUNTER
No new care gaps identified.  [facility] Embedded Care Gaps. Reference number: [id number]. 12/29/2022   11:21:27 AM CST

## 2022-12-29 NOTE — TELEPHONE ENCOUNTER
No new care gaps identified.  [facility] Embedded Care Gaps. Reference number: [id number]. 12/29/2022   8:17:03 AM CST

## 2023-01-03 RX ORDER — INSULIN LISPRO 100 [IU]/ML
20 INJECTION, SOLUTION INTRAVENOUS; SUBCUTANEOUS
Qty: 15 ML | Refills: 11 | Status: SHIPPED | OUTPATIENT
Start: 2023-01-03 | End: 2024-01-09 | Stop reason: SDUPTHER

## 2023-01-03 RX ORDER — INSULIN LISPRO 100 [IU]/ML
20 INJECTION, SOLUTION INTRAVENOUS; SUBCUTANEOUS
Qty: 15 ML | Refills: 11 | Status: CANCELLED | OUTPATIENT
Start: 2023-01-03

## 2023-01-03 RX ORDER — INSULIN ASPART 100 [IU]/ML
20 INJECTION, SOLUTION INTRAVENOUS; SUBCUTANEOUS
Qty: 15 ML | Refills: 11 | Status: CANCELLED | OUTPATIENT
Start: 2023-01-03

## 2023-01-03 NOTE — TELEPHONE ENCOUNTER
Care Due:                  Date            Visit Type   Department     Provider  --------------------------------------------------------------------------------                                ESTABLISHED   [facility] FAMILY  Last Visit: 11-      PATIENT      MEDICINE       Emanuel Lopez  Next Visit: None Scheduled  None         None Found                                                            Last  Test          Frequency    Reason                     Performed    Due Date  --------------------------------------------------------------------------------    Lipid Panel.  12 months..  rosuvastatin.............  01- 01-    Uric Acid...  12 months..  allopurinoL..............  Not Found    Overdue    [employer] Embedded Care Gaps. Reference number: [id number]. 1/03/2023   10:42:29 AM CST

## 2023-01-03 NOTE — TELEPHONE ENCOUNTER
No new care gaps identified.  [facility] Embedded Care Gaps. Reference number: [id number]. 1/03/2023   11:22:04 AM CST

## 2023-01-05 ENCOUNTER — CLINICAL SUPPORT (OUTPATIENT)
Dept: REHABILITATION | Facility: HOSPITAL | Age: 68
End: 2023-01-05
Attending: PHYSICIAN ASSISTANT
Payer: MEDICARE

## 2023-01-05 DIAGNOSIS — Z98.890 CHRONIC NECK PAIN WITH HISTORY OF CERVICAL SPINAL SURGERY: ICD-10-CM

## 2023-01-05 DIAGNOSIS — M54.2 CHRONIC NECK PAIN WITH HISTORY OF CERVICAL SPINAL SURGERY: ICD-10-CM

## 2023-01-05 DIAGNOSIS — G89.28 CHRONIC NECK PAIN WITH HISTORY OF CERVICAL SPINAL SURGERY: ICD-10-CM

## 2023-01-05 PROCEDURE — 97162 PT EVAL MOD COMPLEX 30 MIN: CPT | Mod: PN

## 2023-01-05 PROCEDURE — 97110 THERAPEUTIC EXERCISES: CPT | Mod: PN
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
## 2023-01-10 PROBLEM — Z98.890 CHRONIC NECK PAIN WITH HISTORY OF CERVICAL SPINAL SURGERY: Status: ACTIVE | Noted: 2023-01-10

## 2023-01-10 PROBLEM — M54.2 CHRONIC NECK PAIN WITH HISTORY OF CERVICAL SPINAL SURGERY: Status: ACTIVE | Noted: 2023-01-10

## 2023-01-10 PROBLEM — G89.28 CHRONIC NECK PAIN WITH HISTORY OF CERVICAL SPINAL SURGERY: Status: ACTIVE | Noted: 2023-01-10

## 2023-01-11 ENCOUNTER — OFFICE VISIT (OUTPATIENT)
Dept: TRANSPLANT | Facility: CLINIC | Age: 68
End: 2023-01-11
Payer: MEDICARE

## 2023-01-11 VITALS
DIASTOLIC BLOOD PRESSURE: 84 MMHG | BODY MASS INDEX: 34.53 KG/M2 | TEMPERATURE: 98 F | OXYGEN SATURATION: 97 % | HEIGHT: 73 IN | RESPIRATION RATE: 18 BRPM | HEART RATE: 81 BPM | SYSTOLIC BLOOD PRESSURE: 139 MMHG | WEIGHT: 260.56 LBS

## 2023-01-11 DIAGNOSIS — Z94.4 S/P LIVER TRANSPLANT: Primary | ICD-10-CM

## 2023-01-11 PROCEDURE — 3079F PR MOST RECENT DIASTOLIC BLOOD PRESSURE 80-89 MM HG: ICD-10-PCS | Mod: CPTII,S$GLB,, | Performed by: INTERNAL MEDICINE

## 2023-01-11 PROCEDURE — 3075F SYST BP GE 130 - 139MM HG: CPT | Mod: CPTII,S$GLB,, | Performed by: INTERNAL MEDICINE

## 2023-01-11 PROCEDURE — 99213 OFFICE O/P EST LOW 20 MIN: CPT | Mod: S$GLB,,, | Performed by: INTERNAL MEDICINE

## 2023-01-11 PROCEDURE — 3288F FALL RISK ASSESSMENT DOCD: CPT | Mod: CPTII,S$GLB,, | Performed by: INTERNAL MEDICINE

## 2023-01-11 PROCEDURE — 1159F MED LIST DOCD IN RCRD: CPT | Mod: CPTII,S$GLB,, | Performed by: INTERNAL MEDICINE

## 2023-01-11 PROCEDURE — 3008F PR BODY MASS INDEX (BMI) DOCUMENTED: ICD-10-PCS | Mod: CPTII,S$GLB,, | Performed by: INTERNAL MEDICINE

## 2023-01-11 PROCEDURE — 3288F PR FALLS RISK ASSESSMENT DOCUMENTED: ICD-10-PCS | Mod: CPTII,S$GLB,, | Performed by: INTERNAL MEDICINE

## 2023-01-11 PROCEDURE — 99999 PR PBB SHADOW E&M-EST. PATIENT-LVL V: CPT | Mod: PBBFAC,,, | Performed by: INTERNAL MEDICINE

## 2023-01-11 PROCEDURE — 1125F PR PAIN SEVERITY QUANTIFIED, PAIN PRESENT: ICD-10-PCS | Mod: CPTII,S$GLB,, | Performed by: INTERNAL MEDICINE

## 2023-01-11 PROCEDURE — 1101F PT FALLS ASSESS-DOCD LE1/YR: CPT | Mod: CPTII,S$GLB,, | Performed by: INTERNAL MEDICINE

## 2023-01-11 PROCEDURE — 3079F DIAST BP 80-89 MM HG: CPT | Mod: CPTII,S$GLB,, | Performed by: INTERNAL MEDICINE

## 2023-01-11 PROCEDURE — 1125F AMNT PAIN NOTED PAIN PRSNT: CPT | Mod: CPTII,S$GLB,, | Performed by: INTERNAL MEDICINE

## 2023-01-11 PROCEDURE — 3008F BODY MASS INDEX DOCD: CPT | Mod: CPTII,S$GLB,, | Performed by: INTERNAL MEDICINE

## 2023-01-11 PROCEDURE — 1159F PR MEDICATION LIST DOCUMENTED IN MEDICAL RECORD: ICD-10-PCS | Mod: CPTII,S$GLB,, | Performed by: INTERNAL MEDICINE

## 2023-01-11 PROCEDURE — 99213 PR OFFICE/OUTPT VISIT, EST, LEVL III, 20-29 MIN: ICD-10-PCS | Mod: S$GLB,,, | Performed by: INTERNAL MEDICINE

## 2023-01-11 PROCEDURE — 99999 PR PBB SHADOW E&M-EST. PATIENT-LVL V: ICD-10-PCS | Mod: PBBFAC,,, | Performed by: INTERNAL MEDICINE

## 2023-01-11 PROCEDURE — 3075F PR MOST RECENT SYSTOLIC BLOOD PRESS GE 130-139MM HG: ICD-10-PCS | Mod: CPTII,S$GLB,, | Performed by: INTERNAL MEDICINE

## 2023-01-11 PROCEDURE — 1101F PR PT FALLS ASSESS DOC 0-1 FALLS W/OUT INJ PAST YR: ICD-10-PCS | Mod: CPTII,S$GLB,, | Performed by: INTERNAL MEDICINE

## 2023-01-11 PROCEDURE — 99499 RISK ADDL DX/OHS AUDIT: ICD-10-PCS | Mod: S$GLB,,, | Performed by: INTERNAL MEDICINE

## 2023-01-11 PROCEDURE — 99499 UNLISTED E&M SERVICE: CPT | Mod: S$GLB,,, | Performed by: INTERNAL MEDICINE

## 2023-01-11 NOTE — LETTER
January 11, 2023        Emanuel Lopez  [address]  [address]  [place] [address]  Phone: [phone]  Fax: [phone]             [facility]  [address]  [facility] [address]  Phone: [phone]   Patient: Vick Gonzalez   MR Number: [id number]   YOB: 1955   Date of Visit: 1/11/2023       Dear Dr. Emanuel Lopez    Thank you for referring Vick Gonzalez to me for evaluation. Attached you will find relevant portions of my assessment and plan of care.    If you have questions, please do not hesitate to call me. I look forward to following Vcik Gonzalez along with you.    Sincerely,    James Coleman MD    Enclosure    If you would like to receive this communication electronically, please contact [email or web address] or [phone] to request [employer] access.    [employer] Link is a tool which provides read-only access to select patient information with whom you have a relationship. Its easy to use and provides real time access to review your patients record including encounter summaries, notes, results, and demographic information.    If you feel you have received this communication in error or would no longer like to receive these types of communications, please e-mail [email or web address]

## 2023-01-11 NOTE — PROGRESS NOTES
Subjective:       Patient ID: Vick Gonzalez is a 67 y.o. male.    Chief Complaint: Liver Transplant Follow-up    HPI  I saw Vick Gonzalez today in the Post-Liver Transplant Clinic. 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 He has been doing well post transplant. He feels well with no symptoms of advanced chronic liver disease.    His last liver bx in Dec 2013 showed stage 1 fibrosis only.  Fibrosure showed F4 but his fibroscan in 2019 showed F2.    He is maintained on tacrolimus 2/1 mg daily..    Treated for HCV- ledip/sof and ribavirin but relapsed.  SVR with Epclusa + riba for  24 weeks.    Labs on 12/5/22  LFTs- normal  Creatinine 1.3    BP managed by PCP  Body mass index is 34.38 kg/m².     Had neck surgery but still has numbness in his left arm  - will be seeing neurosurgeon again soon    Abdo US: 3/25/22  1. Elevated hepatic arterial resistive indices, new from prior and nonspecific.  Findings could be seen with chronic liver disease or rejection.  2. Mild intrahepatic biliary ductal dilatation, similar to CT 02/24/2022.      Lab Results   Component Value Date    ALT 27 12/05/2022    AST 24 12/05/2022     (H) 04/24/2017    ALKPHOS 73 12/05/2022    BILITOT 0.3 12/05/2022       Review of Systems   Constitutional:  Negative for activity change, appetite change, fatigue, fever and unexpected weight change.   HENT: Negative.  Negative for ear pain, hearing loss, sinus pressure and tinnitus.    Eyes: Negative.  Negative for photophobia, discharge, itching and visual disturbance.   Respiratory: Negative.  Negative for cough, chest tightness and shortness of breath.    Cardiovascular:  Negative for chest pain, palpitations and leg swelling.   Gastrointestinal:  Negative for constipation, diarrhea, nausea and vomiting.   Genitourinary:  Negative for decreased urine volume, difficulty urinating, dysuria,  frequency (nocturia x 2) and urgency.   Musculoskeletal: Negative.  Negative for arthralgias, back pain, gait problem, joint swelling and myalgias.   Skin: Negative.    Neurological:  Negative for dizziness, seizures, numbness and headaches.   Hematological:  Negative for adenopathy. Does not bruise/bleed easily.   Psychiatric/Behavioral:  Negative for dysphoric mood and sleep disturbance. The patient is not nervous/anxious.        Objective:      Physical Exam  Constitutional:       Appearance: He is well-developed.   HENT:      Head: Normocephalic and atraumatic.   Eyes:      Conjunctiva/sclera: Conjunctivae normal.      Pupils: Pupils are equal, round, and reactive to light.   Neck:      Thyroid: No thyromegaly.   Cardiovascular:      Rate and Rhythm: Normal rate and regular rhythm.      Heart sounds: Normal heart sounds. No murmur heard.  Pulmonary:      Effort: Pulmonary effort is normal.      Breath sounds: Normal breath sounds. No wheezing.   Abdominal:      General: Bowel sounds are normal. There is no distension.      Palpations: Abdomen is soft. There is no mass.      Tenderness: There is no abdominal tenderness.   Musculoskeletal:      Cervical back: Normal range of motion.   Lymphadenopathy:      Cervical: No cervical adenopathy.   Skin:     General: Skin is warm.      Findings: No erythema or rash.   Neurological:      Mental Status: He is alert and oriented to person, place, and time.   Psychiatric:         Behavior: Behavior normal.       Assessment:       1. S/P liver transplant      Plan:   No immunosuppressive changes- continue tac 2/1 mg  Stage 2 fibrosis- some conflicting non-invasive testing     - reminded of the need to lose weight and given advice to cut down on carbs  - improve DM control  - labs every 3 months  - US every 6 months on the assumption that he may have significant liver fibrosis.    Clinic in 1 year.

## 2023-01-12 ENCOUNTER — CLINICAL SUPPORT (OUTPATIENT)
Dept: REHABILITATION | Facility: HOSPITAL | Age: 68
End: 2023-01-12
Attending: PHYSICIAN ASSISTANT
Payer: MEDICARE

## 2023-01-12 DIAGNOSIS — G89.28 CHRONIC NECK PAIN WITH HISTORY OF CERVICAL SPINAL SURGERY: Primary | ICD-10-CM

## 2023-01-12 DIAGNOSIS — Z98.890 CHRONIC NECK PAIN WITH HISTORY OF CERVICAL SPINAL SURGERY: Primary | ICD-10-CM

## 2023-01-12 DIAGNOSIS — M54.2 CHRONIC NECK PAIN WITH HISTORY OF CERVICAL SPINAL SURGERY: Primary | ICD-10-CM

## 2023-01-12 PROCEDURE — 97110 THERAPEUTIC EXERCISES: CPT | Mod: PN

## 2023-01-12 NOTE — PROGRESS NOTES
"[facility] OUTPATIENT THERAPY AND WELLNESS   Physical Therapy Treatment Note     Name: Vick Gonzalez  Clinic Number: [id number]    Therapy Diagnosis:   Encounter Diagnosis   Name Primary?    Chronic neck pain with history of cervical spinal surgery Yes     Physician: Paige Gilbert, *    Visit Date: 1/12/2023    Physician Orders: PT Eval and Treat   Medical Diagnosis from Referral: S/P cervical spinal fusion [Z98.1]  Evaluation Date: 1/5/2023  Authorization Period Expiration: 1/18/2023  Plan of Care Expiration: 1/27/2023  Visit # / Visits authorized: 2/11  FOTO: 2/5  PTA Visit #: 0/5     Time In: 1407  Time Out: 1503  Total Billable Time: 56 minutes    SUBJECTIVE     Pt reports: his neck pain is better. The numbness in his left hand persists. Patient also complains of tightness, both the worst of the thumb and index finger. Patient feels like it is related to tightness/pain in triceps (therapist notes this is close to where ulnar tunnel release was performed).  He was compliant with home exercise program.  Response to previous treatment: Decreased pain  Functional change: Home exercise program compliance    Pain: 5/10  Location: Left distal triceps     OBJECTIVE     01/12/2023    Special tests:  Upper limb tension (-) left. *Patient reports decreased symptoms after median nerve glide  Distraction (-)  Gait Analysis: Decreased right hip and knee flexion in swing    Treatment     Vick received the treatments listed below:      therapeutic exercises to develop strength, endurance, flexibility, and core stabilization for 56 minutes including:    Supine:   Wand shoulder flexion: x10 discharge next  Chin tuck: 5"x20  Chin tuck and lift: 5"x10    Seated:  Ulnar nerve glide: 2x10. Verbal cues and guidance for correct technique discharge next  Chin tuck: 5"x20  Triceps stretch via cross arm abduction: 2x20" left discharge next    Standing:  Median nerve glide at 30 degrees abduction: x30 left. 2 rounds *see " objective  Median nerve glide at 80 degrees abduction: x15 left *see objective  Swiss ball shoulder flexion: Next. If painful, perform forward bow  Hip flexion towards 90 degrees: x10 bilateral with unilateral upper extremity support    Patient Education and Home Exercises     Home Exercises Provided and Patient Education Provided     Education provided:   - Continue home exercise program. Reviewed nerve glides and informed him not to perform nerve glides with resistance band  - Add standing march with band around knees and seated long arc quads with band around ankles.    Written Home Exercises Provided: Continue prior home exercise program. Exercises were reviewed and Vick was able to demonstrate them prior to the end of the session.  Vick demonstrated good  understanding of the education provided. See EMR under Patient Instructions for exercises provided during therapy sessions    ASSESSMENT     Vick is a 67 y.o. male referred to outpatient Physical Therapy with a medical diagnosis of s/p spinal fusion. Patient presents with primary complaints of neck pain, left upper extremity paresthesias, and balance deficits pertaining to right lower extremity. Decreased neck pain with home exercise program performance. Active nerve glides with reduction in hand parethesias. Reports of stumbling might be related to decreased foot clearance.     Vick Is progressing well towards his goals.   Pt prognosis is Fair.     Pt will continue to benefit from skilled outpatient physical therapy to address the deficits listed in the problem list box on initial evaluation, provide pt/family education and to maximize pt's level of independence in the home and community environment.     Pt's spiritual, cultural and educational needs considered and pt agreeable to plan of care and goals.  Anticipated barriers to physical therapy: Chronicity of impairments    Short Term Goals (3 Weeks):   1. Patient will be independent with home  exercise program to supplement therapy sessions in improving pain free mobility. Progressing, not met   2. Patient will report 7/10 pain at worst to promote physical activity. Progressing, not met   3. Patient will perform 6 minute walk to promote onset of walking program. Progressing, not met   Long Term Goals (6 Weeks):   1. Patient will improve FOTO Survey score to </=47% limitation to improve perceived limitation with changing and maintaining body positions. Progressing, not met   2. Patient will report 4/10 pain at worst to promote physical activity. Progressing, not met   3. Patient will improve upper extremity myotomes to >/=4/5 bilateral to improve strength for lifting and carrying tasks. Progressing, not met   4. Patient will perform 6 minute walk without assistive device and with walking speed >/= 0.9 m/s to promote progression of walking program. Progressing, not met     PLAN     Decrease neck pain. Increase cervical strength. Continue nerve glides  Improve balance/gait mechanics    Christine Garcia, PT

## 2023-01-13 ENCOUNTER — OFFICE VISIT (OUTPATIENT)
Dept: OPTOMETRY | Facility: CLINIC | Age: 68
End: 2023-01-13
Payer: MEDICARE

## 2023-01-13 DIAGNOSIS — H52.4 PRESBYOPIA: ICD-10-CM

## 2023-01-13 DIAGNOSIS — E11.65 UNCONTROLLED TYPE 2 DIABETES MELLITUS WITH HYPERGLYCEMIA: ICD-10-CM

## 2023-01-13 DIAGNOSIS — E11.59 HYPERTENSION ASSOCIATED WITH DIABETES: ICD-10-CM

## 2023-01-13 DIAGNOSIS — E11.9 TYPE 2 DIABETES MELLITUS WITHOUT OPHTHALMIC MANIFESTATIONS: Primary | ICD-10-CM

## 2023-01-13 DIAGNOSIS — H25.13 NS (NUCLEAR SCLEROSIS), BILATERAL: ICD-10-CM

## 2023-01-13 DIAGNOSIS — I15.2 HYPERTENSION ASSOCIATED WITH DIABETES: ICD-10-CM

## 2023-01-13 PROCEDURE — 1159F MED LIST DOCD IN RCRD: CPT | Mod: CPTII,S$GLB,, | Performed by: OPTOMETRIST

## 2023-01-13 PROCEDURE — 3288F PR FALLS RISK ASSESSMENT DOCUMENTED: ICD-10-PCS | Mod: CPTII,S$GLB,, | Performed by: OPTOMETRIST

## 2023-01-13 PROCEDURE — 99999 PR PBB SHADOW E&M-EST. PATIENT-LVL IV: CPT | Mod: PBBFAC,,, | Performed by: OPTOMETRIST

## 2023-01-13 PROCEDURE — 99999 PR PBB SHADOW E&M-EST. PATIENT-LVL IV: ICD-10-PCS | Mod: PBBFAC,,, | Performed by: OPTOMETRIST

## 2023-01-13 PROCEDURE — 2023F PR DILATED RETINAL EXAM W/O EVID OF RETINOPATHY: ICD-10-PCS | Mod: CPTII,S$GLB,, | Performed by: OPTOMETRIST

## 2023-01-13 PROCEDURE — 1101F PT FALLS ASSESS-DOCD LE1/YR: CPT | Mod: CPTII,S$GLB,, | Performed by: OPTOMETRIST

## 2023-01-13 PROCEDURE — 1101F PR PT FALLS ASSESS DOC 0-1 FALLS W/OUT INJ PAST YR: ICD-10-PCS | Mod: CPTII,S$GLB,, | Performed by: OPTOMETRIST

## 2023-01-13 PROCEDURE — 92014 PR EYE EXAM, EST PATIENT,COMPREHESV: ICD-10-PCS | Mod: S$GLB,,, | Performed by: OPTOMETRIST

## 2023-01-13 PROCEDURE — 1159F PR MEDICATION LIST DOCUMENTED IN MEDICAL RECORD: ICD-10-PCS | Mod: CPTII,S$GLB,, | Performed by: OPTOMETRIST

## 2023-01-13 PROCEDURE — 1126F AMNT PAIN NOTED NONE PRSNT: CPT | Mod: CPTII,S$GLB,, | Performed by: OPTOMETRIST

## 2023-01-13 PROCEDURE — 3288F FALL RISK ASSESSMENT DOCD: CPT | Mod: CPTII,S$GLB,, | Performed by: OPTOMETRIST

## 2023-01-13 PROCEDURE — 1126F PR PAIN SEVERITY QUANTIFIED, NO PAIN PRESENT: ICD-10-PCS | Mod: CPTII,S$GLB,, | Performed by: OPTOMETRIST

## 2023-01-13 PROCEDURE — 1160F RVW MEDS BY RX/DR IN RCRD: CPT | Mod: CPTII,S$GLB,, | Performed by: OPTOMETRIST

## 2023-01-13 PROCEDURE — 92015 PR REFRACTION: ICD-10-PCS | Mod: S$GLB,,, | Performed by: OPTOMETRIST

## 2023-01-13 PROCEDURE — 92015 DETERMINE REFRACTIVE STATE: CPT | Mod: S$GLB,,, | Performed by: OPTOMETRIST

## 2023-01-13 PROCEDURE — 2023F DILAT RTA XM W/O RTNOPTHY: CPT | Mod: CPTII,S$GLB,, | Performed by: OPTOMETRIST

## 2023-01-13 PROCEDURE — 1160F PR REVIEW ALL MEDS BY PRESCRIBER/CLIN PHARMACIST DOCUMENTED: ICD-10-PCS | Mod: CPTII,S$GLB,, | Performed by: OPTOMETRIST

## 2023-01-13 PROCEDURE — 92014 COMPRE OPH EXAM EST PT 1/>: CPT | Mod: S$GLB,,, | Performed by: OPTOMETRIST

## 2023-01-13 RX ORDER — HYDROCODONE BITARTRATE AND ACETAMINOPHEN 5; 325 MG/1; MG/1
1 TABLET ORAL
COMMUNITY
Start: 2022-12-15 | End: 2023-05-12 | Stop reason: ALTCHOICE

## 2023-01-13 RX ORDER — HYDRALAZINE HYDROCHLORIDE 25 MG/1
25 TABLET, FILM COATED ORAL EVERY 12 HOURS
Status: ON HOLD | COMMUNITY
Start: 2022-12-03 | End: 2023-05-21 | Stop reason: HOSPADM

## 2023-01-13 RX ORDER — METHOCARBAMOL 500 MG/1
500 TABLET, FILM COATED ORAL 3 TIMES DAILY
COMMUNITY
Start: 2022-11-15 | End: 2023-05-12 | Stop reason: ALTCHOICE

## 2023-01-13 NOTE — PROGRESS NOTES
HPI    Last eye exam was 12/8/20 with Dr. Brooks.  Patient states no vision changes since last exam. Didn't fill glasses rx   after last exam. Occasionally sees floaters OU.  Patient denies diplopia, headaches, flashes, and pain.    Hemoglobin A1C       Date                     Value               Ref Range             Status                12/05/2022               7.8 (H)             4.0 - 5.6 %           Final                Last edited by Paula Kaba MA on 1/13/2023  8:51 AM.            Assessment /Plan     For exam results, see Encounter Report.    Type 2 diabetes mellitus without ophthalmic manifestations    Uncontrolled type 2 diabetes mellitus with hyperglycemia  -     Ambulatory referral/consult to Optometry    Hypertension associated with diabetes    NS (nuclear sclerosis), bilateral    Presbyopia      No retinopathy, monitor yearly  Rx specs

## 2023-01-17 ENCOUNTER — TELEPHONE (OUTPATIENT)
Dept: REHABILITATION | Facility: HOSPITAL | Age: 68
End: 2023-01-17
Payer: MEDICARE

## 2023-01-19 ENCOUNTER — CLINICAL SUPPORT (OUTPATIENT)
Dept: REHABILITATION | Facility: HOSPITAL | Age: 68
End: 2023-01-19
Attending: PHYSICIAN ASSISTANT
Payer: MEDICARE

## 2023-01-19 DIAGNOSIS — G89.28 CHRONIC NECK PAIN WITH HISTORY OF CERVICAL SPINAL SURGERY: Primary | ICD-10-CM

## 2023-01-19 DIAGNOSIS — Z98.890 CHRONIC NECK PAIN WITH HISTORY OF CERVICAL SPINAL SURGERY: Primary | ICD-10-CM

## 2023-01-19 DIAGNOSIS — M54.2 CHRONIC NECK PAIN WITH HISTORY OF CERVICAL SPINAL SURGERY: Primary | ICD-10-CM

## 2023-01-19 PROCEDURE — 97110 THERAPEUTIC EXERCISES: CPT | Mod: PN,CQ

## 2023-01-19 NOTE — PROGRESS NOTES
"[facility]   Physical Therapy Treatment Note     Name: Vick Gonzalez  Clinic Number: [id number]    Therapy Diagnosis:   Encounter Diagnosis   Name Primary?    Chronic neck pain with history of cervical spinal surgery Yes     Physician: Paige Gilbert, *    Visit Date: 1/19/2023    Physician Orders: PT Eval and Treat   Medical Diagnosis from Referral: S/P cervical spinal fusion [Z98.1]  Evaluation Date: 1/5/2023  Authorization Period Expiration: 1/18/2023  Plan of Care Expiration: 1/27/2023  Visit # / Visits authorized: 3/11  FOTO: 3/5  PTA Visit #: 1/5     Time In: 4:00 PM  Time Out: 4:38 PM  Total Billable Time: 38 minutes (TE-3)    SUBJECTIVE     Pt reports: his neck pain is better, 3-4/10 but continues with numbness in his left hand along with pain in his distal triceps.  He was compliant with home exercise program.  Response to previous treatment: Decreased pain  Functional change: Home exercise program compliance    Pain: 6/10  Location: Left distal triceps     OBJECTIVE     01/19/2023    Special tests:  Upper limb tension (-) left. *Patient reports decreased symptoms after median nerve glide  Distraction (-)  Gait Analysis: Decreased right hip and knee flexion in swing    Treatment     Vick received the treatments listed below:      therapeutic exercises to develop strength, endurance, flexibility, and core stabilization for 38 minutes including:    Supine:   Chin tuck: 5"x20  Chin tuck and lift: 5"x10    Seated:  Chin tuck: 5"x20    Standing:  Median nerve glide at 30 degrees abduction: x30 left. 2 rounds *see objective  Median nerve glide at 80 degrees abduction: x15 left *see objective  Swiss ball shoulder flexion: x10   Hip flexion towards 90 degrees: x10 bilateral with unilateral upper extremity support    Patient Education and Home Exercises     Home Exercises Provided and Patient Education Provided     Education provided:   - Continue home exercise program. Reviewed " nerve glides and informed him not to perform nerve glides with resistance band  - Add standing march with band around knees and seated long arc quads with band around ankles.    Written Home Exercises Provided: Continue prior home exercise program. Exercises were reviewed and Vick was able to demonstrate them prior to the end of the session.  Vick demonstrated good  understanding of the education provided. See EMR under Patient Instructions for exercises provided during therapy sessions    ASSESSMENT     Vick is a 67 y.o. male referred to outpatient Physical Therapy with a medical diagnosis of s/p spinal fusion. Patient presents with primary complaints of neck pain, left upper extremity paresthesias, and balance deficits pertaining to right lower extremity. Decreased neck pain with home exercise program performance. Patient had no difficulty with new exercise added, noted in bold, with no increase in symptoms prior to leaving the clinic.      Vick Is progressing well towards his goals.   Pt prognosis is Fair.     Pt will continue to benefit from skilled outpatient physical therapy to address the deficits listed in the problem list box on initial evaluation, provide pt/family education and to maximize pt's level of independence in the home and community environment.     Pt's spiritual, cultural and educational needs considered and pt agreeable to plan of care and goals.  Anticipated barriers to physical therapy: Chronicity of impairments    Short Term Goals (3 Weeks):   1. Patient will be independent with home exercise program to supplement therapy sessions in improving pain free mobility. Progressing, not met   2. Patient will report 7/10 pain at worst to promote physical activity. Progressing, not met   3. Patient will perform 6 minute walk to promote onset of walking program. Progressing, not met   Long Term Goals (6 Weeks):   1. Patient will improve FOTO Survey score to </=47% limitation to improve  perceived limitation with changing and maintaining body positions. Progressing, not met   2. Patient will report 4/10 pain at worst to promote physical activity. Progressing, not met   3. Patient will improve upper extremity myotomes to >/=4/5 bilateral to improve strength for lifting and carrying tasks. Progressing, not met   4. Patient will perform 6 minute walk without assistive device and with walking speed >/= 0.9 m/s to promote progression of walking program. Progressing, not met     PLAN     Decrease neck pain. Increase cervical strength. Continue nerve glides  Improve balance/gait mechanics    Nate Johnson, PTA

## 2023-01-23 ENCOUNTER — PATIENT OUTREACH (OUTPATIENT)
Dept: ADMINISTRATIVE | Facility: HOSPITAL | Age: 68
End: 2023-01-23
Payer: MEDICARE

## 2023-01-23 ENCOUNTER — LAB VISIT (OUTPATIENT)
Dept: LAB | Facility: HOSPITAL | Age: 68
End: 2023-01-23
Attending: INTERNAL MEDICINE
Payer: MEDICARE

## 2023-01-23 DIAGNOSIS — Z94.4 S/P LIVER TRANSPLANT: ICD-10-CM

## 2023-01-23 DIAGNOSIS — K74.69 OTHER CIRRHOSIS OF LIVER: ICD-10-CM

## 2023-01-23 LAB
ALBUMIN SERPL BCP-MCNC: 4 G/DL (ref 3.5–5.2)
ALP SERPL-CCNC: 67 U/L (ref 55–135)
ALT SERPL W/O P-5'-P-CCNC: 23 U/L (ref 10–44)
ANION GAP SERPL CALC-SCNC: 11 MMOL/L (ref 8–16)
AST SERPL-CCNC: 21 U/L (ref 10–40)
BASOPHILS # BLD AUTO: 0.03 K/UL (ref 0–0.2)
BASOPHILS NFR BLD: 0.4 % (ref 0–1.9)
BILIRUB SERPL-MCNC: 0.4 MG/DL (ref 0.1–1)
BUN SERPL-MCNC: 13 MG/DL (ref 8–23)
CALCIUM SERPL-MCNC: 9.4 MG/DL (ref 8.7–10.5)
CHLORIDE SERPL-SCNC: 107 MMOL/L (ref 95–110)
CO2 SERPL-SCNC: 23 MMOL/L (ref 23–29)
CREAT SERPL-MCNC: 1.1 MG/DL (ref 0.5–1.4)
DIFFERENTIAL METHOD: ABNORMAL
EOSINOPHIL # BLD AUTO: 0.5 K/UL (ref 0–0.5)
EOSINOPHIL NFR BLD: 7.3 % (ref 0–8)
ERYTHROCYTE [DISTWIDTH] IN BLOOD BY AUTOMATED COUNT: 13.9 % (ref 11.5–14.5)
EST. GFR  (NO RACE VARIABLE): >60 ML/MIN/1.73 M^2
GLUCOSE SERPL-MCNC: 162 MG/DL (ref 70–110)
HCT VFR BLD AUTO: 41.8 % (ref 40–54)
HGB BLD-MCNC: 13.1 G/DL (ref 14–18)
IMM GRANULOCYTES # BLD AUTO: 0.02 K/UL (ref 0–0.04)
IMM GRANULOCYTES NFR BLD AUTO: 0.3 % (ref 0–0.5)
INR PPP: 1 (ref 0.8–1.2)
LYMPHOCYTES # BLD AUTO: 2.7 K/UL (ref 1–4.8)
LYMPHOCYTES NFR BLD: 36.3 % (ref 18–48)
MCH RBC QN AUTO: 29.1 PG (ref 27–31)
MCHC RBC AUTO-ENTMCNC: 31.3 G/DL (ref 32–36)
MCV RBC AUTO: 93 FL (ref 82–98)
MONOCYTES # BLD AUTO: 0.6 K/UL (ref 0.3–1)
MONOCYTES NFR BLD: 8.3 % (ref 4–15)
NEUTROPHILS # BLD AUTO: 3.5 K/UL (ref 1.8–7.7)
NEUTROPHILS NFR BLD: 47.4 % (ref 38–73)
NRBC BLD-RTO: 0 /100 WBC
PLATELET # BLD AUTO: 122 K/UL (ref 150–450)
PMV BLD AUTO: 13.6 FL (ref 9.2–12.9)
POTASSIUM SERPL-SCNC: 4.4 MMOL/L (ref 3.5–5.1)
PROT SERPL-MCNC: 7.7 G/DL (ref 6–8.4)
PROTHROMBIN TIME: 10.7 SEC (ref 9–12.5)
RBC # BLD AUTO: 4.5 M/UL (ref 4.6–6.2)
SODIUM SERPL-SCNC: 141 MMOL/L (ref 136–145)
WBC # BLD AUTO: 7.35 K/UL (ref 3.9–12.7)

## 2023-01-23 PROCEDURE — 80053 COMPREHEN METABOLIC PANEL: CPT | Performed by: INTERNAL MEDICINE

## 2023-01-23 PROCEDURE — 85025 COMPLETE CBC W/AUTO DIFF WBC: CPT | Performed by: INTERNAL MEDICINE

## 2023-01-23 PROCEDURE — 36415 COLL VENOUS BLD VENIPUNCTURE: CPT | Mod: PO | Performed by: INTERNAL MEDICINE

## 2023-01-23 PROCEDURE — 80197 ASSAY OF TACROLIMUS: CPT | Performed by: INTERNAL MEDICINE

## 2023-01-23 PROCEDURE — 85610 PROTHROMBIN TIME: CPT | Performed by: INTERNAL MEDICINE

## 2023-01-24 LAB — TACROLIMUS BLD-MCNC: 3.6 NG/ML (ref 5–15)

## 2023-01-27 ENCOUNTER — TELEPHONE (OUTPATIENT)
Dept: TRANSPLANT | Facility: CLINIC | Age: 68
End: 2023-01-27
Payer: MEDICARE

## 2023-01-27 DIAGNOSIS — Z94.4 S/P LIVER TRANSPLANT: Primary | ICD-10-CM

## 2023-01-27 DIAGNOSIS — Z85.05 HISTORY OF LIVER CANCER: ICD-10-CM

## 2023-01-27 NOTE — LETTER
January 27, 2023    Vick Gonzalez  [address] [address]          Dear Vick Gonzalez:  MRN: [id number]    This is a follow up to your recent labs, your lab results were stable.  There are no medicine changes.  Please have your labs drawn again on 4/24/23.      If you cannot have your labs drawn on the scheduled date, it is your responsibility to call the transplant department to reschedule.  Please call [phone] and ask to speak to [facility]  for all scheduling requests.     Sincerely,    [facility]  [address] [place] [id number]  [phone]

## 2023-01-27 NOTE — TELEPHONE ENCOUNTER
Letter sent, labs stable and no medication changes are needed. Repeat labs due 4/24/23 per protocol.  ----- Message from James Coleman MD sent at 1/27/2023  9:55 AM CST -----  Results reviewed

## 2023-02-02 ENCOUNTER — TELEPHONE (OUTPATIENT)
Dept: NEUROSURGERY | Facility: CLINIC | Age: 68
End: 2023-02-02
Payer: MEDICARE

## 2023-02-02 NOTE — TELEPHONE ENCOUNTER
----- Message from Paige Louise sent at 2/2/2023  3:03 PM CST -----  Type:  Needs Medical Advice    Who Called: Pt   Symptoms (please be specific): having problems with neck and hand    How long has patient had these symptoms:  neck 2 wk. 4/5 months with hand   Pharmacy name and phone #:    Would the patient rather a call back or a response via [facility]? Call back   Best Call Back Number: [phone]  Additional Information:

## 2023-02-07 ENCOUNTER — DOCUMENTATION ONLY (OUTPATIENT)
Dept: REHABILITATION | Facility: HOSPITAL | Age: 68
End: 2023-02-07
Payer: MEDICARE

## 2023-02-07 NOTE — PROGRESS NOTES
Patient reports his primary complaint, left hand numbness, has not changed since beginning therapy. Patient wants to cancel the rest of his appointments. EMG next week. Patient agreeable to contacting clinic and scheduling a follow up on evaluating therapist if neurosurgery wants him to resume. Will discharge if no word by March.

## 2023-02-08 DIAGNOSIS — E11.9 TYPE 2 DIABETES MELLITUS WITHOUT COMPLICATION: ICD-10-CM

## 2023-02-13 ENCOUNTER — PATIENT MESSAGE (OUTPATIENT)
Dept: ADMINISTRATIVE | Facility: HOSPITAL | Age: 68
End: 2023-02-13
Payer: MEDICARE

## 2023-02-14 ENCOUNTER — HOSPITAL ENCOUNTER (OUTPATIENT)
Dept: RADIOLOGY | Facility: HOSPITAL | Age: 68
Discharge: HOME | End: 2023-02-14
Attending: PHYSICIAN ASSISTANT
Payer: MEDICARE

## 2023-02-14 ENCOUNTER — OFFICE VISIT (OUTPATIENT)
Dept: NEUROLOGY | Facility: CLINIC | Age: 68
End: 2023-02-14
Payer: MEDICARE

## 2023-02-14 ENCOUNTER — PROCEDURE VISIT (OUTPATIENT)
Dept: NEUROLOGY | Facility: CLINIC | Age: 68
End: 2023-02-14
Payer: MEDICARE

## 2023-02-14 VITALS
SYSTOLIC BLOOD PRESSURE: 146 MMHG | DIASTOLIC BLOOD PRESSURE: 82 MMHG | DIASTOLIC BLOOD PRESSURE: 82 MMHG | HEART RATE: 99 BPM | SYSTOLIC BLOOD PRESSURE: 146 MMHG | HEART RATE: 99 BPM

## 2023-02-14 DIAGNOSIS — M54.50 CHRONIC BILATERAL LOW BACK PAIN WITHOUT SCIATICA: ICD-10-CM

## 2023-02-14 DIAGNOSIS — R29.898 LEFT HAND WEAKNESS: ICD-10-CM

## 2023-02-14 DIAGNOSIS — M79.602 PAIN OF LEFT UPPER EXTREMITY: Primary | ICD-10-CM

## 2023-02-14 DIAGNOSIS — R20.0 NUMBNESS OF LEFT HAND: ICD-10-CM

## 2023-02-14 DIAGNOSIS — Z98.1 S/P CERVICAL SPINAL FUSION: ICD-10-CM

## 2023-02-14 DIAGNOSIS — G89.29 CHRONIC BILATERAL LOW BACK PAIN WITHOUT SCIATICA: ICD-10-CM

## 2023-02-14 PROCEDURE — 72110 X-RAY EXAM L-2 SPINE 4/>VWS: CPT | Mod: 26,,, | Performed by: RADIOLOGY

## 2023-02-14 PROCEDURE — 1159F PR MEDICATION LIST DOCUMENTED IN MEDICAL RECORD: ICD-10-PCS | Mod: CPTII,S$GLB,, | Performed by: PSYCHIATRY & NEUROLOGY

## 2023-02-14 PROCEDURE — 99203 PR OFFICE/OUTPT VISIT, NEW, LEVL III, 30-44 MIN: ICD-10-PCS | Mod: 25,S$GLB,, | Performed by: PSYCHIATRY & NEUROLOGY

## 2023-02-14 PROCEDURE — 3079F DIAST BP 80-89 MM HG: CPT | Mod: CPTII,S$GLB,, | Performed by: PSYCHIATRY & NEUROLOGY

## 2023-02-14 PROCEDURE — 72110 X-RAY EXAM L-2 SPINE 4/>VWS: CPT | Mod: TC,FY

## 2023-02-14 PROCEDURE — 3077F PR MOST RECENT SYSTOLIC BLOOD PRESSURE >= 140 MM HG: ICD-10-PCS | Mod: CPTII,S$GLB,, | Performed by: PSYCHIATRY & NEUROLOGY

## 2023-02-14 PROCEDURE — 1125F PR PAIN SEVERITY QUANTIFIED, PAIN PRESENT: ICD-10-PCS | Mod: CPTII,S$GLB,, | Performed by: PSYCHIATRY & NEUROLOGY

## 2023-02-14 PROCEDURE — 1125F AMNT PAIN NOTED PAIN PRSNT: CPT | Mod: CPTII,S$GLB,, | Performed by: PSYCHIATRY & NEUROLOGY

## 2023-02-14 PROCEDURE — 3079F PR MOST RECENT DIASTOLIC BLOOD PRESSURE 80-89 MM HG: ICD-10-PCS | Mod: CPTII,S$GLB,, | Performed by: PSYCHIATRY & NEUROLOGY

## 2023-02-14 PROCEDURE — 1159F MED LIST DOCD IN RCRD: CPT | Mod: CPTII,S$GLB,, | Performed by: PSYCHIATRY & NEUROLOGY

## 2023-02-14 PROCEDURE — 99999 PR PBB SHADOW E&M-EST. PATIENT-LVL IV: ICD-10-PCS | Mod: PBBFAC,,, | Performed by: PSYCHIATRY & NEUROLOGY

## 2023-02-14 PROCEDURE — 95910 NRV CNDJ TEST 7-8 STUDIES: CPT | Mod: S$GLB,,, | Performed by: PSYCHIATRY & NEUROLOGY

## 2023-02-14 PROCEDURE — 99999 PR PBB SHADOW E&M-EST. PATIENT-LVL IV: CPT | Mod: PBBFAC,,, | Performed by: PSYCHIATRY & NEUROLOGY

## 2023-02-14 PROCEDURE — 1101F PR PT FALLS ASSESS DOC 0-1 FALLS W/OUT INJ PAST YR: ICD-10-PCS | Mod: CPTII,S$GLB,, | Performed by: PSYCHIATRY & NEUROLOGY

## 2023-02-14 PROCEDURE — 3077F SYST BP >= 140 MM HG: CPT | Mod: CPTII,S$GLB,, | Performed by: PSYCHIATRY & NEUROLOGY

## 2023-02-14 PROCEDURE — 1160F RVW MEDS BY RX/DR IN RCRD: CPT | Mod: CPTII,S$GLB,, | Performed by: PSYCHIATRY & NEUROLOGY

## 2023-02-14 PROCEDURE — 1160F PR REVIEW ALL MEDS BY PRESCRIBER/CLIN PHARMACIST DOCUMENTED: ICD-10-PCS | Mod: CPTII,S$GLB,, | Performed by: PSYCHIATRY & NEUROLOGY

## 2023-02-14 PROCEDURE — 72110 XR LUMBAR SPINE AP AND LAT WITH FLEX/EXT: ICD-10-PCS | Mod: 26,,, | Performed by: RADIOLOGY

## 2023-02-14 PROCEDURE — 99203 OFFICE O/P NEW LOW 30 MIN: CPT | Mod: 25,S$GLB,, | Performed by: PSYCHIATRY & NEUROLOGY

## 2023-02-14 PROCEDURE — 95886 MUSC TEST DONE W/N TEST COMP: CPT | Mod: S$GLB,,, | Performed by: PSYCHIATRY & NEUROLOGY

## 2023-02-14 PROCEDURE — 3288F PR FALLS RISK ASSESSMENT DOCUMENTED: ICD-10-PCS | Mod: CPTII,S$GLB,, | Performed by: PSYCHIATRY & NEUROLOGY

## 2023-02-14 PROCEDURE — 3288F FALL RISK ASSESSMENT DOCD: CPT | Mod: CPTII,S$GLB,, | Performed by: PSYCHIATRY & NEUROLOGY

## 2023-02-14 PROCEDURE — 1101F PT FALLS ASSESS-DOCD LE1/YR: CPT | Mod: CPTII,S$GLB,, | Performed by: PSYCHIATRY & NEUROLOGY

## 2023-02-14 PROCEDURE — 95910 PR NERVE CONDUCTION STUDY; 7-8 STUDIES: ICD-10-PCS | Mod: S$GLB,,, | Performed by: PSYCHIATRY & NEUROLOGY

## 2023-02-14 PROCEDURE — 95886 PR EMG COMPLETE, W/ NERVE CONDUCTION STUDIES, 5+ MUSCLES: ICD-10-PCS | Mod: S$GLB,,, | Performed by: PSYCHIATRY & NEUROLOGY

## 2023-02-14 NOTE — PROGRESS NOTES
[facility] [address]     Subjective:       Patient ID: Vick Gonzalez is a 67 y.o. male here for a EMG focused evaluation for arm pain. Previous visits and diagnostic evaluation reviewed.  Patient has a complicated past medical history including neck surgery as well as recent ulnar nerve surgery and carpal tunnel release on the left.  Patient complains of continued pain involving the left shoulder, elbow, and wrist and forearm regions.  Pain radiates down the left arm as well as numbness which intermittently involves the entire left hand.  Symptoms have been progressively worsening and severe at times.   HPI  Review of patient's allergies indicates:  No Known Allergies   Vitals:    02/14/23 0942   BP: (!) 146/82   Pulse: 99      Chief Complaint: Numbness and Pain    Past Medical History:   Diagnosis Date    Anemia     Anxiety     Cataract     Chronic pain syndrome 07/13/2011    CKD (chronic kidney disease), stage III     Diabetes mellitus type II, uncontrolled     Discitis of lumbosacral region 01/16/2015    ED (erectile dysfunction)     Encounter for blood transfusion     Genital herpes     Gout, arthritis     History of alcohol abuse     History of hepatitis C, s/p successful Rx w/ SVR24 (cure) - 5/2018 08/23/2011    Completed 24wks Epclusa + RBV w/SVR12 - 2/2018  -     History of positive PPD, treatment status unknown     Pulmonary granulomas, negative sputum cultures for AFB and indeterminate quantferon test    History of substance abuse     Hypertension     Hypothyroidism     Liver replaced by transplant 08/23/2011    DATE: 12/16/2013  LIVER BIOPSY : REASON:  hep C staging  PATHOLOGY COMMITTEE NOTE/PLAN: :grade  1 / stage 1        Pancreatitis 2016    Peptic ulcer disease       Social History     Socioeconomic History    Marital status:    Tobacco Use    Smoking status: Former     Passive exposure: Never    Smokeless tobacco: Never    Substance and Sexual Activity    Alcohol use: No     Comment: over 5 years ago, none currently    Drug use: Not Currently     Comment: Former cocaine use    Sexual activity: Yes      Review of Systems:   No Fever  No SOB  No vomiting  No visual disturbance      Objective:      Physical Exam    Constitutional: Patient appears well-nourished.   Head: Normocephalic and atraumatic.   Mouth/Throat: Oropharynx is clear and moist.   Pulmonary/Chest: Effort normal.   Abdominal: Soft.   Skin: Skin is warm and dry.      General:  Patient is alert and cooperative.  Affect:  Patient is appropriate to surroundings and environment.  Language:  Speech is fluent.  HEENT:  There are no outward signs of trauma to head or face.  Cranial Nerves:  Pupils are equal round and reactive to light. Extra-ocular movements are intact. Face, tongue, and palate are symmetrical.  Motor:  Patient exhibits normal strength testing in bilateral proximal and distal upper extremities.  Reflexes:  Symmetrical in bilateral upper extremities.  Gait:  Ambulation is independent with use of cane.  Cerebellar:  Normal finger to nose testing without dysmetria.  Sensory:  Intact to sensory modalities tested.  Musculoskeletal:  There is some tenderness to palpation and manipulation of the cervical spine region.   Assessment:       We reviewed and discussed at length results of EMG of the left upper extremity performed today notable for a chronic left C7 radiculopathy as well as a mild left ulnar neuropathy best localized to the across elbow segment. These findings are available via media section of chart review.   1. Pain of left upper extremity              Plan:       We discussed treatment options at length. Recommend patient keep appointment with referring provider.         Rip Salas MD   02/14/2023   10:28 AM

## 2023-02-14 NOTE — PROGRESS NOTES
[facility] [address]       Patient received an EMG and nerve conduction test today.  Please refer to the full procedure report which is found in the media section of chart review.    Rip Salas MD, FAAN  Neurology

## 2023-02-16 ENCOUNTER — TELEPHONE (OUTPATIENT)
Dept: NEUROSURGERY | Facility: CLINIC | Age: 68
End: 2023-02-16
Payer: MEDICARE

## 2023-02-16 NOTE — TELEPHONE ENCOUNTER
"Informed pt,per Paige     "I do not recommend narcotics.  He should discuss with his PCP if he can take NSAIDs with his PMH"    Pt voiced understanding   "

## 2023-02-16 NOTE — TELEPHONE ENCOUNTER
I do not recommend narcotics.  He should discuss with his PCP if he can take NSAIDs with his PMH.    Thanks,  Paige Gilbert, [facility], PA-C  Neurosurgery  [facility]  02/16/2023

## 2023-02-16 NOTE — TELEPHONE ENCOUNTER
----- Message from Paige Louise sent at 2/16/2023  9:54 AM CST -----  Type:  Needs Medical Advice    Who Called: Pt   Symptoms (please be specific): pain in left arm    How long has patient had these symptoms:  months   Pharmacy name and phone #:    Would the patient rather a call back or a response via [facility]? Call back   Best Call Back Number: [phone]  Additional Information:

## 2023-02-27 NOTE — TELEPHONE ENCOUNTER
Letter sent, labs stable and no medication changes are needed. Repeat labs due 6/3/19 per protocol.     Pre-procedure checklist reviewed.    MD aware of maximum contrast dose of 141 mL.

## 2023-02-28 ENCOUNTER — TELEPHONE (OUTPATIENT)
Dept: ENDOCRINOLOGY | Facility: CLINIC | Age: 68
End: 2023-02-28
Payer: MEDICARE

## 2023-03-06 ENCOUNTER — HOSPITAL ENCOUNTER (OUTPATIENT)
Dept: RADIOLOGY | Facility: HOSPITAL | Age: 68
Discharge: HOME | End: 2023-03-06
Attending: INTERNAL MEDICINE
Payer: MEDICARE

## 2023-03-06 DIAGNOSIS — Z94.4 S/P LIVER TRANSPLANT: ICD-10-CM

## 2023-03-06 PROCEDURE — 76705 ECHO EXAM OF ABDOMEN: CPT | Mod: 26,XS,, | Performed by: RADIOLOGY

## 2023-03-06 PROCEDURE — 76705 US DOPPLER LIVER TRANSPLANT POST (XPD): ICD-10-PCS | Mod: 26,XS,, | Performed by: RADIOLOGY

## 2023-03-06 PROCEDURE — 93976 US DOPPLER LIVER TRANSPLANT POST (XPD): ICD-10-PCS | Mod: 26,,, | Performed by: RADIOLOGY

## 2023-03-06 PROCEDURE — 93976 VASCULAR STUDY: CPT | Mod: TC

## 2023-03-06 PROCEDURE — 93976 VASCULAR STUDY: CPT | Mod: 26,,, | Performed by: RADIOLOGY

## 2023-03-10 ENCOUNTER — TELEPHONE (OUTPATIENT)
Dept: TRANSPLANT | Facility: CLINIC | Age: 68
End: 2023-03-10
Payer: MEDICARE

## 2023-03-10 DIAGNOSIS — Z94.4 S/P LIVER TRANSPLANT: Primary | ICD-10-CM

## 2023-03-10 NOTE — TELEPHONE ENCOUNTER
Letter sent, ultrasound stable  ----- Message from James Coleman MD sent at 3/10/2023  3:02 PM CST -----  Results reviewed

## 2023-03-10 NOTE — LETTER
March 10, 2023    Vick Ray Carlos  [address] [facility] [place] [address]             [facility]  [address]  [place] [address]  Phone: [phone] Mr. Gonzalez,    Dr. Coleman reviewed your liver ultrasound. He said it is stable. No issues noted. We will have you repeat another ultrasound in September.    Please let us know if you have any questions or concerns.    Sincerely,    Kandy Herrera RN, BSN, CCTC  Sr. Liver Transplant Coordinator

## 2023-03-14 ENCOUNTER — TELEPHONE (OUTPATIENT)
Dept: FAMILY MEDICINE | Facility: CLINIC | Age: 68
End: 2023-03-14
Payer: MEDICARE

## 2023-03-14 ENCOUNTER — OFFICE VISIT (OUTPATIENT)
Dept: NEUROSURGERY | Facility: CLINIC | Age: 68
End: 2023-03-14
Payer: MEDICARE

## 2023-03-14 ENCOUNTER — TELEPHONE (OUTPATIENT)
Dept: ADMINISTRATIVE | Facility: OTHER | Age: 68
End: 2023-03-14
Payer: MEDICARE

## 2023-03-14 VITALS — HEIGHT: 73 IN | WEIGHT: 260.56 LBS | BODY MASS INDEX: 34.53 KG/M2

## 2023-03-14 DIAGNOSIS — R29.898 LEFT HAND WEAKNESS: ICD-10-CM

## 2023-03-14 DIAGNOSIS — M54.12 CERVICAL RADICULOPATHY: Primary | ICD-10-CM

## 2023-03-14 DIAGNOSIS — E11.65 UNCONTROLLED TYPE 2 DIABETES MELLITUS WITH HYPERGLYCEMIA: Primary | ICD-10-CM

## 2023-03-14 DIAGNOSIS — Z98.1 S/P CERVICAL SPINAL FUSION: ICD-10-CM

## 2023-03-14 DIAGNOSIS — G56.22 ULNAR NEUROPATHY AT ELBOW OF LEFT UPPER EXTREMITY: ICD-10-CM

## 2023-03-14 PROCEDURE — 1159F MED LIST DOCD IN RCRD: CPT | Mod: CPTII,S$GLB,, | Performed by: NEUROLOGICAL SURGERY

## 2023-03-14 PROCEDURE — 4010F ACE/ARB THERAPY RXD/TAKEN: CPT | Mod: CPTII,S$GLB,, | Performed by: NEUROLOGICAL SURGERY

## 2023-03-14 PROCEDURE — 1125F AMNT PAIN NOTED PAIN PRSNT: CPT | Mod: CPTII,S$GLB,, | Performed by: NEUROLOGICAL SURGERY

## 2023-03-14 PROCEDURE — 99999 PR PBB SHADOW E&M-EST. PATIENT-LVL IV: CPT | Mod: PBBFAC,,, | Performed by: NEUROLOGICAL SURGERY

## 2023-03-14 PROCEDURE — 3288F FALL RISK ASSESSMENT DOCD: CPT | Mod: CPTII,S$GLB,, | Performed by: NEUROLOGICAL SURGERY

## 2023-03-14 PROCEDURE — 99215 OFFICE O/P EST HI 40 MIN: CPT | Mod: S$GLB,,, | Performed by: NEUROLOGICAL SURGERY

## 2023-03-14 PROCEDURE — 1101F PT FALLS ASSESS-DOCD LE1/YR: CPT | Mod: CPTII,S$GLB,, | Performed by: NEUROLOGICAL SURGERY

## 2023-03-14 PROCEDURE — 1101F PR PT FALLS ASSESS DOC 0-1 FALLS W/OUT INJ PAST YR: ICD-10-PCS | Mod: CPTII,S$GLB,, | Performed by: NEUROLOGICAL SURGERY

## 2023-03-14 PROCEDURE — 1125F PR PAIN SEVERITY QUANTIFIED, PAIN PRESENT: ICD-10-PCS | Mod: CPTII,S$GLB,, | Performed by: NEUROLOGICAL SURGERY

## 2023-03-14 PROCEDURE — 4010F PR ACE/ARB THEARPY RXD/TAKEN: ICD-10-PCS | Mod: CPTII,S$GLB,, | Performed by: NEUROLOGICAL SURGERY

## 2023-03-14 PROCEDURE — 3288F PR FALLS RISK ASSESSMENT DOCUMENTED: ICD-10-PCS | Mod: CPTII,S$GLB,, | Performed by: NEUROLOGICAL SURGERY

## 2023-03-14 PROCEDURE — 1159F PR MEDICATION LIST DOCUMENTED IN MEDICAL RECORD: ICD-10-PCS | Mod: CPTII,S$GLB,, | Performed by: NEUROLOGICAL SURGERY

## 2023-03-14 PROCEDURE — 99215 PR OFFICE/OUTPT VISIT, EST, LEVL V, 40-54 MIN: ICD-10-PCS | Mod: S$GLB,,, | Performed by: NEUROLOGICAL SURGERY

## 2023-03-14 PROCEDURE — 3008F PR BODY MASS INDEX (BMI) DOCUMENTED: ICD-10-PCS | Mod: CPTII,S$GLB,, | Performed by: NEUROLOGICAL SURGERY

## 2023-03-14 PROCEDURE — 3008F BODY MASS INDEX DOCD: CPT | Mod: CPTII,S$GLB,, | Performed by: NEUROLOGICAL SURGERY

## 2023-03-14 PROCEDURE — 99999 PR PBB SHADOW E&M-EST. PATIENT-LVL IV: ICD-10-PCS | Mod: PBBFAC,,, | Performed by: NEUROLOGICAL SURGERY

## 2023-03-14 RX ORDER — BLOOD-GLUCOSE TRANSMITTER
EACH MISCELLANEOUS
Qty: 1 EACH | Refills: 11 | Status: SHIPPED | OUTPATIENT
Start: 2023-03-14 | End: 2023-05-01 | Stop reason: SDUPTHER

## 2023-03-14 RX ORDER — BLOOD-GLUCOSE SENSOR
EACH MISCELLANEOUS
Qty: 3 EACH | Refills: 11 | Status: SHIPPED | OUTPATIENT
Start: 2023-03-14 | End: 2023-08-09

## 2023-03-14 NOTE — TELEPHONE ENCOUNTER
The patient came in stating that he had spoken to [employer] about getting a Dexcom CGM. They told him he can get one with an order from you. Patient is requesting that you place and print an order to be faxed to [employer]?
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
## 2023-03-14 NOTE — TELEPHONE ENCOUNTER
Have printed the prescription for Dexcom sensor and transmitter.  If he does not have a smart phone to download the Dexcom gutierrez would need a prescription also printed out for a .  Will also place Diabetes Education visit to help with training on how to use/set up

## 2023-03-28 ENCOUNTER — HOSPITAL ENCOUNTER (OUTPATIENT)
Dept: RADIOLOGY | Facility: HOSPITAL | Age: 68
Discharge: HOME | End: 2023-03-28
Attending: NEUROLOGICAL SURGERY
Payer: MEDICARE

## 2023-03-28 DIAGNOSIS — M54.12 CERVICAL RADICULOPATHY: ICD-10-CM

## 2023-03-28 PROCEDURE — 72141 MRI NECK SPINE W/O DYE: CPT | Mod: TC

## 2023-03-28 PROCEDURE — 72141 MRI CERVICAL SPINE WITHOUT CONTRAST: ICD-10-PCS | Mod: 26,,, | Performed by: RADIOLOGY

## 2023-03-28 PROCEDURE — 72141 MRI NECK SPINE W/O DYE: CPT | Mod: 26,,, | Performed by: RADIOLOGY

## 2023-03-31 ENCOUNTER — PATIENT MESSAGE (OUTPATIENT)
Dept: PAIN MEDICINE | Facility: OTHER | Age: 68
End: 2023-03-31
Payer: MEDICARE

## 2023-04-03 RX ORDER — SODIUM CHLORIDE 9 MG/ML
500 INJECTION, SOLUTION INTRAVENOUS CONTINUOUS
Status: CANCELLED | OUTPATIENT
Start: 2023-04-03

## 2023-04-17 ENCOUNTER — TELEPHONE (OUTPATIENT)
Dept: NEUROSURGERY | Facility: CLINIC | Age: 68
End: 2023-04-17
Payer: MEDICARE

## 2023-04-18 ENCOUNTER — OFFICE VISIT (OUTPATIENT)
Dept: NEUROSURGERY | Facility: CLINIC | Age: 68
End: 2023-04-18
Payer: MEDICARE

## 2023-04-18 VITALS
SYSTOLIC BLOOD PRESSURE: 130 MMHG | HEIGHT: 73 IN | WEIGHT: 260 LBS | HEART RATE: 76 BPM | DIASTOLIC BLOOD PRESSURE: 81 MMHG | BODY MASS INDEX: 34.46 KG/M2

## 2023-04-18 DIAGNOSIS — Z98.1 S/P CERVICAL SPINAL FUSION: ICD-10-CM

## 2023-04-18 DIAGNOSIS — G56.22 ULNAR NEUROPATHY AT ELBOW OF LEFT UPPER EXTREMITY: ICD-10-CM

## 2023-04-18 DIAGNOSIS — M54.12 CERVICAL RADICULOPATHY: Primary | ICD-10-CM

## 2023-04-18 PROCEDURE — 3079F PR MOST RECENT DIASTOLIC BLOOD PRESSURE 80-89 MM HG: ICD-10-PCS | Mod: CPTII,S$GLB,, | Performed by: NEUROLOGICAL SURGERY

## 2023-04-18 PROCEDURE — 99999 PR PBB SHADOW E&M-EST. PATIENT-LVL V: ICD-10-PCS | Mod: PBBFAC,,, | Performed by: NEUROLOGICAL SURGERY

## 2023-04-18 PROCEDURE — 3288F FALL RISK ASSESSMENT DOCD: CPT | Mod: CPTII,S$GLB,, | Performed by: NEUROLOGICAL SURGERY

## 2023-04-18 PROCEDURE — 1125F PR PAIN SEVERITY QUANTIFIED, PAIN PRESENT: ICD-10-PCS | Mod: CPTII,S$GLB,, | Performed by: NEUROLOGICAL SURGERY

## 2023-04-18 PROCEDURE — 3008F PR BODY MASS INDEX (BMI) DOCUMENTED: ICD-10-PCS | Mod: CPTII,S$GLB,, | Performed by: NEUROLOGICAL SURGERY

## 2023-04-18 PROCEDURE — 1125F AMNT PAIN NOTED PAIN PRSNT: CPT | Mod: CPTII,S$GLB,, | Performed by: NEUROLOGICAL SURGERY

## 2023-04-18 PROCEDURE — 1159F MED LIST DOCD IN RCRD: CPT | Mod: CPTII,S$GLB,, | Performed by: NEUROLOGICAL SURGERY

## 2023-04-18 PROCEDURE — 99215 OFFICE O/P EST HI 40 MIN: CPT | Mod: S$GLB,,, | Performed by: NEUROLOGICAL SURGERY

## 2023-04-18 PROCEDURE — 1159F PR MEDICATION LIST DOCUMENTED IN MEDICAL RECORD: ICD-10-PCS | Mod: CPTII,S$GLB,, | Performed by: NEUROLOGICAL SURGERY

## 2023-04-18 PROCEDURE — 99215 PR OFFICE/OUTPT VISIT, EST, LEVL V, 40-54 MIN: ICD-10-PCS | Mod: S$GLB,,, | Performed by: NEUROLOGICAL SURGERY

## 2023-04-18 PROCEDURE — 4010F PR ACE/ARB THEARPY RXD/TAKEN: ICD-10-PCS | Mod: CPTII,S$GLB,, | Performed by: NEUROLOGICAL SURGERY

## 2023-04-18 PROCEDURE — 99999 PR PBB SHADOW E&M-EST. PATIENT-LVL V: CPT | Mod: PBBFAC,,, | Performed by: NEUROLOGICAL SURGERY

## 2023-04-18 PROCEDURE — 3079F DIAST BP 80-89 MM HG: CPT | Mod: CPTII,S$GLB,, | Performed by: NEUROLOGICAL SURGERY

## 2023-04-18 PROCEDURE — 1101F PT FALLS ASSESS-DOCD LE1/YR: CPT | Mod: CPTII,S$GLB,, | Performed by: NEUROLOGICAL SURGERY

## 2023-04-18 PROCEDURE — 3075F PR MOST RECENT SYSTOLIC BLOOD PRESS GE 130-139MM HG: ICD-10-PCS | Mod: CPTII,S$GLB,, | Performed by: NEUROLOGICAL SURGERY

## 2023-04-18 PROCEDURE — 3288F PR FALLS RISK ASSESSMENT DOCUMENTED: ICD-10-PCS | Mod: CPTII,S$GLB,, | Performed by: NEUROLOGICAL SURGERY

## 2023-04-18 PROCEDURE — 3008F BODY MASS INDEX DOCD: CPT | Mod: CPTII,S$GLB,, | Performed by: NEUROLOGICAL SURGERY

## 2023-04-18 PROCEDURE — 4010F ACE/ARB THERAPY RXD/TAKEN: CPT | Mod: CPTII,S$GLB,, | Performed by: NEUROLOGICAL SURGERY

## 2023-04-18 PROCEDURE — 3075F SYST BP GE 130 - 139MM HG: CPT | Mod: CPTII,S$GLB,, | Performed by: NEUROLOGICAL SURGERY

## 2023-04-18 PROCEDURE — 1101F PR PT FALLS ASSESS DOC 0-1 FALLS W/OUT INJ PAST YR: ICD-10-PCS | Mod: CPTII,S$GLB,, | Performed by: NEUROLOGICAL SURGERY
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
## 2023-04-24 ENCOUNTER — LAB VISIT (OUTPATIENT)
Dept: LAB | Facility: HOSPITAL | Age: 68
End: 2023-04-24
Attending: INTERNAL MEDICINE
Payer: MEDICARE

## 2023-04-24 DIAGNOSIS — Z94.4 S/P LIVER TRANSPLANT: ICD-10-CM

## 2023-04-24 DIAGNOSIS — Z85.05 HISTORY OF LIVER CANCER: ICD-10-CM

## 2023-04-24 LAB
AFP SERPL-MCNC: 4.2 NG/ML (ref 0–8.4)
ALBUMIN SERPL BCP-MCNC: 3.7 G/DL (ref 3.5–5.2)
ALP SERPL-CCNC: 77 U/L (ref 55–135)
ALT SERPL W/O P-5'-P-CCNC: 21 U/L (ref 10–44)
ANION GAP SERPL CALC-SCNC: 9 MMOL/L (ref 8–16)
AST SERPL-CCNC: 17 U/L (ref 10–40)
BASOPHILS # BLD AUTO: 0.03 K/UL (ref 0–0.2)
BASOPHILS NFR BLD: 0.4 % (ref 0–1.9)
BILIRUB SERPL-MCNC: 0.3 MG/DL (ref 0.1–1)
BUN SERPL-MCNC: 18 MG/DL (ref 8–23)
CALCIUM SERPL-MCNC: 9 MG/DL (ref 8.7–10.5)
CHLORIDE SERPL-SCNC: 107 MMOL/L (ref 95–110)
CO2 SERPL-SCNC: 25 MMOL/L (ref 23–29)
CREAT SERPL-MCNC: 1.2 MG/DL (ref 0.5–1.4)
DIFFERENTIAL METHOD: ABNORMAL
EOSINOPHIL # BLD AUTO: 0.5 K/UL (ref 0–0.5)
EOSINOPHIL NFR BLD: 6.9 % (ref 0–8)
ERYTHROCYTE [DISTWIDTH] IN BLOOD BY AUTOMATED COUNT: 14.1 % (ref 11.5–14.5)
EST. GFR  (NO RACE VARIABLE): >60 ML/MIN/1.73 M^2
GLUCOSE SERPL-MCNC: 175 MG/DL (ref 70–110)
HCT VFR BLD AUTO: 38.6 % (ref 40–54)
HGB BLD-MCNC: 12.2 G/DL (ref 14–18)
IMM GRANULOCYTES # BLD AUTO: 0.02 K/UL (ref 0–0.04)
IMM GRANULOCYTES NFR BLD AUTO: 0.3 % (ref 0–0.5)
INR PPP: 1 (ref 0.8–1.2)
LYMPHOCYTES # BLD AUTO: 3.3 K/UL (ref 1–4.8)
LYMPHOCYTES NFR BLD: 42.2 % (ref 18–48)
MCH RBC QN AUTO: 29.3 PG (ref 27–31)
MCHC RBC AUTO-ENTMCNC: 31.6 G/DL (ref 32–36)
MCV RBC AUTO: 93 FL (ref 82–98)
MONOCYTES # BLD AUTO: 0.6 K/UL (ref 0.3–1)
MONOCYTES NFR BLD: 8.1 % (ref 4–15)
NEUTROPHILS # BLD AUTO: 3.3 K/UL (ref 1.8–7.7)
NEUTROPHILS NFR BLD: 42.1 % (ref 38–73)
NRBC BLD-RTO: 0 /100 WBC
PLATELET # BLD AUTO: 149 K/UL (ref 150–450)
PMV BLD AUTO: 13.2 FL (ref 9.2–12.9)
POTASSIUM SERPL-SCNC: 4.4 MMOL/L (ref 3.5–5.1)
PROT SERPL-MCNC: 7.2 G/DL (ref 6–8.4)
PROTHROMBIN TIME: 10.3 SEC (ref 9–12.5)
RBC # BLD AUTO: 4.17 M/UL (ref 4.6–6.2)
SODIUM SERPL-SCNC: 141 MMOL/L (ref 136–145)
WBC # BLD AUTO: 7.86 K/UL (ref 3.9–12.7)

## 2023-04-24 PROCEDURE — 85025 COMPLETE CBC W/AUTO DIFF WBC: CPT | Performed by: INTERNAL MEDICINE

## 2023-04-24 PROCEDURE — 82105 ALPHA-FETOPROTEIN SERUM: CPT | Performed by: INTERNAL MEDICINE

## 2023-04-24 PROCEDURE — 36415 COLL VENOUS BLD VENIPUNCTURE: CPT | Mod: PO | Performed by: INTERNAL MEDICINE

## 2023-04-24 PROCEDURE — 80053 COMPREHEN METABOLIC PANEL: CPT | Performed by: INTERNAL MEDICINE

## 2023-04-24 PROCEDURE — 85610 PROTHROMBIN TIME: CPT | Performed by: INTERNAL MEDICINE

## 2023-04-24 PROCEDURE — 80197 ASSAY OF TACROLIMUS: CPT | Performed by: INTERNAL MEDICINE

## 2023-04-25 ENCOUNTER — DOCUMENTATION ONLY (OUTPATIENT)
Dept: REHABILITATION | Facility: HOSPITAL | Age: 68
End: 2023-04-25
Payer: MEDICARE

## 2023-04-25 PROBLEM — G89.28 CHRONIC NECK PAIN WITH HISTORY OF CERVICAL SPINAL SURGERY: Status: RESOLVED | Noted: 2023-01-10 | Resolved: 2023-04-25

## 2023-04-25 PROBLEM — R29.898 LEFT HAND WEAKNESS: Status: RESOLVED | Noted: 2022-02-01 | Resolved: 2023-04-25

## 2023-04-25 PROBLEM — R29.898 FINE MOTOR IMPAIRMENT: Status: RESOLVED | Noted: 2022-02-14 | Resolved: 2023-04-25

## 2023-04-25 PROBLEM — G54.9 MYELORADICULOPATHY: Status: RESOLVED | Noted: 2022-08-22 | Resolved: 2023-04-25

## 2023-04-25 PROBLEM — R29.818 FINE MOTOR IMPAIRMENT: Status: RESOLVED | Noted: 2022-02-14 | Resolved: 2023-04-25

## 2023-04-25 PROBLEM — R20.0 LOSS OF FEELING OR SENSATION: Status: RESOLVED | Noted: 2022-02-14 | Resolved: 2023-04-25

## 2023-04-25 PROBLEM — Z98.890 CHRONIC NECK PAIN WITH HISTORY OF CERVICAL SPINAL SURGERY: Status: RESOLVED | Noted: 2023-01-10 | Resolved: 2023-04-25

## 2023-04-25 PROBLEM — M54.2 CHRONIC NECK PAIN WITH HISTORY OF CERVICAL SPINAL SURGERY: Status: RESOLVED | Noted: 2023-01-10 | Resolved: 2023-04-25

## 2023-04-25 LAB — TACROLIMUS BLD-MCNC: 5.4 NG/ML (ref 5–15)

## 2023-04-25 NOTE — PROGRESS NOTES
Patient was evaluated on 23 and was seen 3 times for physical therapy. Patient has not attended physical therapy since 23. Patient given home exercise program. Plan of care and/or authorization . Current status is unknown. Patient to be discharged at this time.

## 2023-04-26 ENCOUNTER — PATIENT MESSAGE (OUTPATIENT)
Dept: PAIN MEDICINE | Facility: OTHER | Age: 68
End: 2023-04-26
Payer: MEDICARE

## 2023-04-27 ENCOUNTER — TELEPHONE (OUTPATIENT)
Dept: TRANSPLANT | Facility: CLINIC | Age: 68
End: 2023-04-27
Payer: MEDICARE

## 2023-04-27 NOTE — TELEPHONE ENCOUNTER
Letter sent, labs stable and no medication changes are needed. Repeat labs due 8/21/23 per protocol.  ----- Message from James Coleman MD sent at 4/27/2023  2:19 PM CDT -----  Results reviewed

## 2023-04-27 NOTE — LETTER
April 27, 2023    Vick Gonzalez  [address] [address]          Dear Vick Gonzalez:  MRN: [id number]    This is a follow up to your recent labs, your lab results were stable.  There are no medicine changes.  Please have your labs drawn again in 4 months - 8/21/23.      If you cannot have your labs drawn on the scheduled date, it is your responsibility to call the transplant department to reschedule.  Please call [phone] and ask to speak to Delvin Cuadra, medical assistant, for all scheduling requests.     Sincerely,      Kandy    Your Liver Transplant Coordinator    [facility]  [address] [place] [phone]

## 2023-05-01 RX ORDER — BLOOD-GLUCOSE TRANSMITTER
EACH MISCELLANEOUS
Qty: 1 EACH | Refills: 11 | Status: SHIPPED | OUTPATIENT
Start: 2023-05-01

## 2023-05-01 NOTE — TELEPHONE ENCOUNTER
Returned call to patient. He stated that he had thrown away his Dexcom transmitter and needs a new prescription. I told him that there should be refills available for it and he and a family member both got on the phone and told me that he threw it away and can't get it from the pharmacy without a new prescription. Verified his pharmacy and told them I would send a request.

## 2023-05-01 NOTE — TELEPHONE ENCOUNTER
Care Due:                  Date            Visit Type   Department     Provider  --------------------------------------------------------------------------------                                ESTABLISHED   [facility]  Last Visit: 11-      PATIENT      MEDICINE       Emanuel Lopez  Next Visit: None Scheduled  None         None Found                                                            Last  Test          Frequency    Reason                     Performed    Due Date  --------------------------------------------------------------------------------    HBA1C.......  6 months...  insulin..................  12- 06-    Lipid Panel.  12 months..  rosuvastatin.............  01- 01-    Uric Acid...  12 months..  allopurinoL..............  Not Found    Overdue    [employer] Embedded Care Due Messages. Reference number: [id number].   5/01/2023 3:12:25 PM CDT

## 2023-05-02 NOTE — TELEPHONE ENCOUNTER
No care due was identified.  [facility] Embedded Care Due Messages. Reference number: [id number].   5/02/2023 6:50:10 PM CDT

## 2023-05-03 RX ORDER — METOPROLOL SUCCINATE 200 MG/1
TABLET, EXTENDED RELEASE ORAL
Qty: 90 TABLET | Refills: 3 | Status: SHIPPED | OUTPATIENT
Start: 2023-05-03

## 2023-05-03 RX ORDER — ROSUVASTATIN CALCIUM 5 MG/1
TABLET, COATED ORAL
Qty: 90 TABLET | Refills: 11 | Status: ON HOLD | OUTPATIENT
Start: 2023-05-03 | End: 2023-08-03 | Stop reason: HOSPADM

## 2023-05-12 ENCOUNTER — HOSPITAL ENCOUNTER (INPATIENT)
Facility: HOSPITAL | Age: 68
LOS: 8 days | Discharge: HOME | DRG: 291 | End: 2023-05-21
Attending: EMERGENCY MEDICINE | Admitting: EMERGENCY MEDICINE
Payer: MEDICARE

## 2023-05-12 DIAGNOSIS — M79.89 LEG SWELLING: ICD-10-CM

## 2023-05-12 DIAGNOSIS — J81.0 ACUTE PULMONARY EDEMA: Primary | ICD-10-CM

## 2023-05-12 DIAGNOSIS — R07.9 CHEST PAIN: ICD-10-CM

## 2023-05-12 DIAGNOSIS — E11.42 TYPE 2 DIABETES MELLITUS WITH DIABETIC POLYNEUROPATHY, WITH LONG-TERM CURRENT USE OF INSULIN: Chronic | ICD-10-CM

## 2023-05-12 DIAGNOSIS — Z79.4 TYPE 2 DIABETES MELLITUS WITH DIABETIC POLYNEUROPATHY, WITH LONG-TERM CURRENT USE OF INSULIN: Chronic | ICD-10-CM

## 2023-05-12 DIAGNOSIS — I50.9 ACUTE CONGESTIVE HEART FAILURE: ICD-10-CM

## 2023-05-12 DIAGNOSIS — Z94.4 LIVER TRANSPLANT STATUS: ICD-10-CM

## 2023-05-12 DIAGNOSIS — I50.9 ACUTE DECOMPENSATED HEART FAILURE: ICD-10-CM

## 2023-05-12 LAB
ALBUMIN SERPL BCP-MCNC: 3.8 G/DL (ref 3.5–5.2)
ALP SERPL-CCNC: 62 U/L (ref 55–135)
ALT SERPL W/O P-5'-P-CCNC: 26 U/L (ref 10–44)
ANION GAP SERPL CALC-SCNC: 10 MMOL/L (ref 8–16)
AST SERPL-CCNC: 30 U/L (ref 10–40)
BASOPHILS # BLD AUTO: 0.03 K/UL (ref 0–0.2)
BASOPHILS NFR BLD: 0.4 % (ref 0–1.9)
BILIRUB SERPL-MCNC: 0.5 MG/DL (ref 0.1–1)
BILIRUB UR QL STRIP: NEGATIVE
BNP SERPL-MCNC: 46 PG/ML (ref 0–99)
BUN SERPL-MCNC: 15 MG/DL (ref 8–23)
CALCIUM SERPL-MCNC: 9.6 MG/DL (ref 8.7–10.5)
CHLORIDE SERPL-SCNC: 105 MMOL/L (ref 95–110)
CLARITY UR REFRACT.AUTO: CLEAR
CO2 SERPL-SCNC: 26 MMOL/L (ref 23–29)
COLOR UR AUTO: YELLOW
CREAT SERPL-MCNC: 1.2 MG/DL (ref 0.5–1.4)
DIFFERENTIAL METHOD: ABNORMAL
EOSINOPHIL # BLD AUTO: 0.4 K/UL (ref 0–0.5)
EOSINOPHIL NFR BLD: 4.9 % (ref 0–8)
ERYTHROCYTE [DISTWIDTH] IN BLOOD BY AUTOMATED COUNT: 14.1 % (ref 11.5–14.5)
EST. GFR  (NO RACE VARIABLE): >60 ML/MIN/1.73 M^2
GLUCOSE SERPL-MCNC: 60 MG/DL (ref 70–110)
GLUCOSE UR QL STRIP: NEGATIVE
HCT VFR BLD AUTO: 38.2 % (ref 40–54)
HGB BLD-MCNC: 11.7 G/DL (ref 14–18)
HGB UR QL STRIP: NEGATIVE
HIV 1+2 AB+HIV1 P24 AG SERPL QL IA: NORMAL
IMM GRANULOCYTES # BLD AUTO: 0.04 K/UL (ref 0–0.04)
IMM GRANULOCYTES NFR BLD AUTO: 0.5 % (ref 0–0.5)
KETONES UR QL STRIP: NEGATIVE
LEUKOCYTE ESTERASE UR QL STRIP: NEGATIVE
LYMPHOCYTES # BLD AUTO: 2.3 K/UL (ref 1–4.8)
LYMPHOCYTES NFR BLD: 28.9 % (ref 18–48)
MCH RBC QN AUTO: 28.3 PG (ref 27–31)
MCHC RBC AUTO-ENTMCNC: 30.6 G/DL (ref 32–36)
MCV RBC AUTO: 92 FL (ref 82–98)
MONOCYTES # BLD AUTO: 0.9 K/UL (ref 0.3–1)
MONOCYTES NFR BLD: 11.1 % (ref 4–15)
NEUTROPHILS # BLD AUTO: 4.3 K/UL (ref 1.8–7.7)
NEUTROPHILS NFR BLD: 54.2 % (ref 38–73)
NITRITE UR QL STRIP: NEGATIVE
NRBC BLD-RTO: 0 /100 WBC
PH UR STRIP: 7 [PH] (ref 5–8)
PLATELET # BLD AUTO: 133 K/UL (ref 150–450)
PMV BLD AUTO: 13 FL (ref 9.2–12.9)
POCT GLUCOSE: 101 MG/DL (ref 70–110)
POTASSIUM SERPL-SCNC: 4.2 MMOL/L (ref 3.5–5.1)
PROT SERPL-MCNC: 7.1 G/DL (ref 6–8.4)
PROT UR QL STRIP: ABNORMAL
RBC # BLD AUTO: 4.14 M/UL (ref 4.6–6.2)
SODIUM SERPL-SCNC: 141 MMOL/L (ref 136–145)
SP GR UR STRIP: 1.02 (ref 1–1.03)
TROPONIN I SERPL DL<=0.01 NG/ML-MCNC: 0.01 NG/ML (ref 0–0.03)
URN SPEC COLLECT METH UR: ABNORMAL
WBC # BLD AUTO: 7.99 K/UL (ref 3.9–12.7)

## 2023-05-12 PROCEDURE — 99285 PR EMERGENCY DEPT VISIT,LEVEL V: ICD-10-PCS | Mod: FS,,, | Performed by: EMERGENCY MEDICINE

## 2023-05-12 PROCEDURE — 99285 EMERGENCY DEPT VISIT HI MDM: CPT | Mod: 25

## 2023-05-12 PROCEDURE — G0378 HOSPITAL OBSERVATION PER HR: HCPCS

## 2023-05-12 PROCEDURE — 99223 1ST HOSP IP/OBS HIGH 75: CPT | Mod: ,,, | Performed by: HOSPITALIST

## 2023-05-12 PROCEDURE — 81003 URINALYSIS AUTO W/O SCOPE: CPT | Performed by: PHYSICIAN ASSISTANT

## 2023-05-12 PROCEDURE — 85025 COMPLETE CBC W/AUTO DIFF WBC: CPT | Performed by: PHYSICIAN ASSISTANT

## 2023-05-12 PROCEDURE — 99285 EMERGENCY DEPT VISIT HI MDM: CPT | Mod: FS,,, | Performed by: EMERGENCY MEDICINE

## 2023-05-12 PROCEDURE — 84484 ASSAY OF TROPONIN QUANT: CPT | Performed by: PHYSICIAN ASSISTANT

## 2023-05-12 PROCEDURE — 83880 ASSAY OF NATRIURETIC PEPTIDE: CPT | Performed by: PHYSICIAN ASSISTANT

## 2023-05-12 PROCEDURE — 93010 ELECTROCARDIOGRAM REPORT: CPT | Mod: ,,, | Performed by: INTERNAL MEDICINE

## 2023-05-12 PROCEDURE — 87389 HIV-1 AG W/HIV-1&-2 AB AG IA: CPT | Performed by: PHYSICIAN ASSISTANT

## 2023-05-12 PROCEDURE — 93010 EKG 12-LEAD: ICD-10-PCS | Mod: ,,, | Performed by: INTERNAL MEDICINE

## 2023-05-12 PROCEDURE — 93005 ELECTROCARDIOGRAM TRACING: CPT

## 2023-05-12 PROCEDURE — 63600175 PHARM REV CODE 636 W HCPCS: Performed by: PHYSICIAN ASSISTANT

## 2023-05-12 PROCEDURE — 25000003 PHARM REV CODE 250: Performed by: PHYSICIAN ASSISTANT

## 2023-05-12 PROCEDURE — 80053 COMPREHEN METABOLIC PANEL: CPT | Performed by: PHYSICIAN ASSISTANT

## 2023-05-12 PROCEDURE — 99223 PR INITIAL HOSPITAL CARE,LEVL III: ICD-10-PCS | Mod: ,,, | Performed by: HOSPITALIST

## 2023-05-12 RX ORDER — LISINOPRIL 20 MG/1
40 TABLET ORAL DAILY
Status: DISCONTINUED | OUTPATIENT
Start: 2023-05-13 | End: 2023-05-19

## 2023-05-12 RX ORDER — LIDOCAINE 50 MG/G
1 PATCH TOPICAL
Status: COMPLETED | OUTPATIENT
Start: 2023-05-12 | End: 2023-05-13

## 2023-05-12 RX ORDER — ISOSORBIDE DINITRATE 20 MG/1
20 TABLET ORAL 3 TIMES DAILY
Status: DISCONTINUED | OUTPATIENT
Start: 2023-05-13 | End: 2023-05-13

## 2023-05-12 RX ORDER — GABAPENTIN 400 MG/1
800 CAPSULE ORAL 3 TIMES DAILY
Status: DISCONTINUED | OUTPATIENT
Start: 2023-05-13 | End: 2023-05-16

## 2023-05-12 RX ORDER — TALC
6 POWDER (GRAM) TOPICAL NIGHTLY PRN
Status: DISCONTINUED | OUTPATIENT
Start: 2023-05-12 | End: 2023-05-21 | Stop reason: HOSPADM

## 2023-05-12 RX ORDER — ENOXAPARIN SODIUM 100 MG/ML
40 INJECTION SUBCUTANEOUS EVERY 24 HOURS
Status: DISCONTINUED | OUTPATIENT
Start: 2023-05-13 | End: 2023-05-21 | Stop reason: HOSPADM

## 2023-05-12 RX ORDER — OXYCODONE HYDROCHLORIDE 5 MG/1
5 TABLET ORAL
Status: COMPLETED | OUTPATIENT
Start: 2023-05-12 | End: 2023-05-12

## 2023-05-12 RX ORDER — TACROLIMUS 1 MG/1
2 CAPSULE ORAL EVERY MORNING
Status: DISCONTINUED | OUTPATIENT
Start: 2023-05-13 | End: 2023-05-15

## 2023-05-12 RX ORDER — DEXTROSE 40 %
15 GEL (GRAM) ORAL
Status: DISCONTINUED | OUTPATIENT
Start: 2023-05-13 | End: 2023-05-21 | Stop reason: HOSPADM

## 2023-05-12 RX ORDER — METOPROLOL SUCCINATE 100 MG/1
200 TABLET, EXTENDED RELEASE ORAL DAILY
Status: DISCONTINUED | OUTPATIENT
Start: 2023-05-14 | End: 2023-05-21 | Stop reason: HOSPADM

## 2023-05-12 RX ORDER — FUROSEMIDE 10 MG/ML
60 INJECTION INTRAMUSCULAR; INTRAVENOUS 2 TIMES DAILY
Status: DISCONTINUED | OUTPATIENT
Start: 2023-05-13 | End: 2023-05-13

## 2023-05-12 RX ORDER — ATORVASTATIN CALCIUM 20 MG/1
20 TABLET, FILM COATED ORAL DAILY
Status: DISCONTINUED | OUTPATIENT
Start: 2023-05-13 | End: 2023-05-21 | Stop reason: HOSPADM

## 2023-05-12 RX ORDER — SERTRALINE HYDROCHLORIDE 100 MG/1
100 TABLET, FILM COATED ORAL DAILY
Status: DISCONTINUED | OUTPATIENT
Start: 2023-05-13 | End: 2023-05-21 | Stop reason: HOSPADM

## 2023-05-12 RX ORDER — SODIUM CHLORIDE 0.9 % (FLUSH) 0.9 %
10 SYRINGE (ML) INJECTION
Status: DISCONTINUED | OUTPATIENT
Start: 2023-05-12 | End: 2023-05-21 | Stop reason: HOSPADM

## 2023-05-12 RX ORDER — INSULIN ASPART 100 [IU]/ML
12 INJECTION, SOLUTION INTRAVENOUS; SUBCUTANEOUS
Status: DISCONTINUED | OUTPATIENT
Start: 2023-05-13 | End: 2023-05-13

## 2023-05-12 RX ORDER — FUROSEMIDE 10 MG/ML
40 INJECTION INTRAMUSCULAR; INTRAVENOUS
Status: COMPLETED | OUTPATIENT
Start: 2023-05-12 | End: 2023-05-12

## 2023-05-12 RX ORDER — TRAZODONE HYDROCHLORIDE 50 MG/1
50 TABLET ORAL NIGHTLY
Status: DISCONTINUED | OUTPATIENT
Start: 2023-05-13 | End: 2023-05-21 | Stop reason: HOSPADM

## 2023-05-12 RX ORDER — HYDRALAZINE HYDROCHLORIDE 25 MG/1
25 TABLET, FILM COATED ORAL 3 TIMES DAILY
Status: DISCONTINUED | OUTPATIENT
Start: 2023-05-13 | End: 2023-05-13

## 2023-05-12 RX ORDER — SODIUM CHLORIDE 0.9 % (FLUSH) 0.9 %
10 SYRINGE (ML) INJECTION
Status: DISCONTINUED | OUTPATIENT
Start: 2023-05-13 | End: 2023-05-21 | Stop reason: HOSPADM

## 2023-05-12 RX ORDER — LEVOTHYROXINE SODIUM 88 UG/1
88 TABLET ORAL DAILY
Status: DISCONTINUED | OUTPATIENT
Start: 2023-05-13 | End: 2023-05-21 | Stop reason: HOSPADM

## 2023-05-12 RX ORDER — INSULIN ASPART 100 [IU]/ML
1-10 INJECTION, SOLUTION INTRAVENOUS; SUBCUTANEOUS
Status: DISCONTINUED | OUTPATIENT
Start: 2023-05-13 | End: 2023-05-21 | Stop reason: HOSPADM

## 2023-05-12 RX ORDER — DEXTROSE 40 %
30 GEL (GRAM) ORAL
Status: DISCONTINUED | OUTPATIENT
Start: 2023-05-13 | End: 2023-05-21 | Stop reason: HOSPADM

## 2023-05-12 RX ORDER — NAPROXEN SODIUM 220 MG/1
81 TABLET, FILM COATED ORAL DAILY
Status: DISCONTINUED | OUTPATIENT
Start: 2023-05-13 | End: 2023-05-21 | Stop reason: HOSPADM

## 2023-05-12 RX ORDER — TACROLIMUS 1 MG/1
1 CAPSULE ORAL EVERY EVENING
Status: DISCONTINUED | OUTPATIENT
Start: 2023-05-13 | End: 2023-05-15

## 2023-05-12 RX ORDER — GLUCAGON 1 MG
1 KIT INJECTION
Status: DISCONTINUED | OUTPATIENT
Start: 2023-05-13 | End: 2023-05-21 | Stop reason: HOSPADM

## 2023-05-12 RX ORDER — FERROUS SULFATE, DRIED 160(50) MG
1 TABLET, EXTENDED RELEASE ORAL 2 TIMES DAILY
Status: DISCONTINUED | OUTPATIENT
Start: 2023-05-13 | End: 2023-05-21 | Stop reason: HOSPADM

## 2023-05-12 RX ORDER — ALBUTEROL SULFATE 90 UG/1
2 AEROSOL, METERED RESPIRATORY (INHALATION) EVERY 6 HOURS PRN
Status: DISCONTINUED | OUTPATIENT
Start: 2023-05-13 | End: 2023-05-21 | Stop reason: HOSPADM

## 2023-05-12 RX ADMIN — LIDOCAINE 1 PATCH: 50 PATCH TOPICAL at 07:05

## 2023-05-12 RX ADMIN — FUROSEMIDE 40 MG: 10 INJECTION, SOLUTION INTRAMUSCULAR; INTRAVENOUS at 09:05

## 2023-05-12 RX ADMIN — OXYCODONE HYDROCHLORIDE 5 MG: 5 TABLET ORAL at 09:05

## 2023-05-12 NOTE — ED PROVIDER NOTES
Encounter Date: 5/12/2023       History     Chief Complaint   Patient presents with    Edema     Lower leg swelling, liver xplant  2009, lower back pain, denies bowel/bladder incontinence     66 y/o M with history of liver transplant June 2010, HTN, gout, DM2, CKD, Hep C (s/p treatment), hypothyroidism, PAD with stents to LE presents to the ED c/o LE edema since Monday.  He was on fluid pills after transplant for a period of time, his edema improved so this was discontinued and he is not on any diuretics currently. He has had intermittent LE edema that was improved with elevation but the swelling since Monday is refractory to elevation.  He reports some mild abdominal distention in this period of time and NARAYANAN. He has been having R lower back pain, worse with movement, no trauma. He has chronic numbness to bilateral feet from neuropathy that is unchanged. Denies fever, chills, chest pain, SOB at rest, abdominal pain, dysuria, diarrhea, headache.     The history is provided by the patient and medical records. No  was used.   Review of patient's allergies indicates:  No Known Allergies  Past Medical History:   Diagnosis Date    Acute congestive heart failure 5/12/2023    Anemia     Anxiety     Cataract     Chronic pain syndrome 07/13/2011    CKD (chronic kidney disease), stage III     Diabetes mellitus type II, uncontrolled     Discitis of lumbosacral region 01/16/2015    ED (erectile dysfunction)     Encounter for blood transfusion     Genital herpes     Gout, arthritis     History of alcohol abuse     History of hepatitis C, s/p successful Rx w/ SVR24 (cure) - 5/2018 08/23/2011    Completed 24wks Epclusa + RBV w/SVR12 - 2/2018  -     History of positive PPD, treatment status unknown     Pulmonary granulomas, negative sputum cultures for AFB and indeterminate quantferon test    History of substance abuse     Hypertension     Hypothyroidism     Liver replaced by transplant 08/23/2011    DATE:  12/16/2013  LIVER BIOPSY : REASON:  hep C staging  PATHOLOGY COMMITTEE NOTE/PLAN: :grade  1 / stage 1        Pancreatitis 2016    Peptic ulcer disease      Past Surgical History:   Procedure Laterality Date    ANTERIOR CERVICAL DISCECTOMY W/ FUSION N/A 8/22/2022    Procedure: Procedure: ACDF 4-7 LOS: 4.0 Anesthesia: General Blood: Type & Screen Radiology: C-Arm Microscope: ------- SNS: EMG, SEP, MEP Brace: Paden Bed: Regular Bed, Shoulder Strap Headrest:------ Position: Supine Equipment: Depuy;  Surgeon: Titi Christian MD;  Location: [facility];  Service: Neurosurgery;  Laterality: N/A;  Depuy  confirmed CW 8/19  Neuro monitoring confirmed CW 8/19    CARPAL TUNNEL RELEASE Left 10/29/2021    Procedure: RELEASE, CARPAL TUNNEL;  Surgeon: Jameson Alejo Jr., MD;  Location: [facility];  Service: Orthopedics;  Laterality: Left;    CHOLECYSTECTOMY      DECOMPRESSION OF CERVICAL SPINE BY ANTERIOR APPROACH WITH FUSION  8/22/2022    Procedure: DECOMPRESSION AND FUSION, SPINE, CERVICAL, ANTERIOR APPROACH;  Surgeon: Titi Christian MD;  Location[facility];  Service: Neurosurgery;;    INJECTION OF JOINT Right 12/2/2019    Procedure: INJECTION, JOINT SI;  Surgeon: Thu Penn MD;  Location: [facility]IN [facility];  Service: Pain Management;  Laterality: Right;  RT SI JNT INJ    LIVER TRANSPLANT  06/2010    SPINE SURGERY       Family History   Problem Relation Age of Onset    Cancer Mother     Diabetes Mother     Heart disease Mother     Diabetes Sister     Cancer Maternal Uncle 82        colon CA    Drug abuse Daughter     Melanoma Neg Hx     Psoriasis Neg Hx     Lupus Neg Hx     Eczema Neg Hx     Acne Neg Hx      Social History     Tobacco Use    Smoking status: Former     Passive exposure: Never    Smokeless tobacco: Never   Substance Use Topics    Alcohol use: No     Comment: over 5 years ago, none currently    Drug use: Not Currently     Comment: Former cocaine use     Review of Systems   Constitutional:  Negative for chills and fever.    HENT:  Negative for congestion.    Respiratory:          +NARAYANAN   Cardiovascular:  Positive for leg swelling. Negative for chest pain.   Gastrointestinal:  Positive for abdominal distention. Negative for abdominal pain, diarrhea, nausea and vomiting.   Genitourinary:  Negative for dysuria and hematuria.   Musculoskeletal:  Positive for back pain.   Skin:  Negative for rash.   Neurological:  Positive for numbness (feet - chronic from neuropathy). Negative for light-headedness and headaches.   Psychiatric/Behavioral:  Negative for confusion.      Physical Exam     Initial Vitals [05/12/23 1558]   BP Pulse Resp Temp SpO2   (!) 153/100 80 18 97.8 °F (36.6 °C) 97 %      MAP       --         Physical Exam    Nursing note and vitals reviewed.  Constitutional: He appears well-developed and well-nourished. He is not diaphoretic. No distress.   HENT:   Head: Normocephalic and atraumatic.   Neck: Neck supple.   Normal range of motion.  Cardiovascular:  Normal rate, regular rhythm and normal heart sounds.     Exam reveals no gallop and no friction rub.       No murmur heard.  2+ bilateral LE edema. Unable to palpate pedal pulses due to edema but easily auscultated via doppler   Pulmonary/Chest: No respiratory distress.   Abdominal: Abdomen is soft. There is no abdominal tenderness.   Musculoskeletal:      Cervical back: Normal range of motion and neck supple.      Comments: No muscular tenderness to bilateral LE. No cellulitis. No calf tenderness. Muscular tenderness noted to the R low back, no midline L spine tenderness. No rashes.      Neurological: He is alert and oriented to person, place, and time.   Decreased sensation to bilateral LE - chronic from peripheral neuropathy   Skin: Skin is warm and dry. No rash noted.   No wounds noted to the feet.    Psychiatric: He has a normal mood and affect.       ED Course   Procedures  Labs Reviewed   CBC W/ AUTO DIFFERENTIAL - Abnormal; Notable for the following components:        Result Value    RBC 4.14 (*)     Hemoglobin 11.7 (*)     Hematocrit 38.2 (*)     MCHC 30.6 (*)     Platelets 133 (*)     MPV 13.0 (*)     All other components within normal limits   COMPREHENSIVE METABOLIC PANEL - Abnormal; Notable for the following components:    Glucose 60 (*)     All other components within normal limits   URINALYSIS, REFLEX TO URINE CULTURE - Abnormal; Notable for the following components:    Protein, UA Trace (*)     All other components within normal limits    Narrative:     Specimen Source->Urine   HIV 1 / 2 ANTIBODY    Narrative:     Release to patient->Immediate   TROPONIN I   B-TYPE NATRIURETIC PEPTIDE   POCT GLUCOSE   POCT GLUCOSE MONITORING CONTINUOUS     EKG Readings: (Independently Interpreted)   Initial Reading: No STEMI. Rhythm: Normal Sinus Rhythm. Heart Rate: 72. Clinical Impression: Normal Sinus Rhythm   Artifact      Imaging Results              X-Ray Chest PA And Lateral (Final result)  Result time 05/12/23 18:48:24      Final result by Mio Page MD (05/12/23 18:48:24)                   Impression:      Findings suggesting mild pulmonary edema.  No focal consolidation.    Electronically signed by resident: Justin Patel  Date:    05/12/2023  Time:    18:37    Electronically signed by: Mio Page MD  Date:    05/12/2023  Time:    18:48               Narrative:    EXAMINATION:  XR CHEST PA AND LATERAL    CLINICAL HISTORY:  LE edema, NARAYANAN;    TECHNIQUE:  PA and lateral views of the chest were performed.    COMPARISON:  Cervical spine radiograph 12/07/2022, chest radiograph 04/13/2022    FINDINGS:  Lungs are symmetrically expanded with prominent interstitial markings bilaterally.  No focal area of consolidation.  No pneumothorax or evidence of significant pleural effusion.    Mediastinal structures are midline.  Cardiomediastinal silhouette is stable in size.    Postoperative changes of prior ACDF.  There is no evidence of free air beneath the hemidiaphragms.                                        Medications   LIDOcaine 5 % patch 1 patch (1 patch Transdermal Patch Applied 5/12/23 1901)   furosemide injection 40 mg (40 mg Intravenous Given 5/12/23 2105)   oxyCODONE immediate release tablet 5 mg (5 mg Oral Given 5/12/23 2105)     Medical Decision Making:   History:   Old Medical Records: I decided to obtain old medical records.  Old Records Summarized: records from clinic visits and records from previous admission(s).       <> Summary of Records: Liver transplant 2010  H/o claudication with stents to LE in 2017  Clinical Tests:   Lab Tests: Ordered and Reviewed  Radiological Study: Ordered and Reviewed  Medical Tests: Reviewed and Ordered  Other:   I have discussed this case with another health care provider.       <> Summary of the Discussion: Hospital medicine     APC / Resident Notes:   66 y/o M with history of liver transplant June 2010, HTN, gout, DM2, CKD, Hep C (s/p treatment), hypothyroidism, PAD with stents to LE presents to the ED c/o LE edema since Monday. VSS. Well appearing. Abdomen soft, nontender. 2+ LE edema noted. Decreased sensation to bilateral feet - chronic. Unable to palpate pedal pulses due to edema but easily auscultated via doppler. Muscular tenderness to the R low back, no midline L spine tenderness. No rashes. Lungs clear. DDx includes but is not limited to dependent edema, new onset HF, fluid overload, liver failure, OSMANY. Will get labs, CXR.    UA with no infection.  No leukocytosis.  Hypoglycemia noted with glucose of 60.  Patient given apple juice.  Troponin and BNP within normal limits.    Chest x-ray independently reviewed, pulmonary edema.    Ambulated around the ED by a nurse, desat to 86% on room air with ambulation.  IV Lasix ordered.    Discuss with Hospital Medicine, placed in observation for further management of fluid overload with desat and a liver transplant patient.  No prior echo in our system.     Attending Attestation:     Physician Attestation  Statement for NP/PA:   I have conducted a face to face encounter with this patient in addition to the NP/PA, due to Medical Complexity    Other NP/PA Attestation Additions:      Medical Decision Makin yo male presenting with lower extremity edema, NARAYANAN.  Denies chest pain, fevers. S/p liver transplant.     On exam, no respiratory distress at rest.  Desaturated with ambulation.  RRR  +peripheral edema  Feet are warm and well-perfused   Right paraspinal lumbar TTP, no spinal TTP  5/5 hip flexion, knee extension/flexion, dorsi/plantar flexion     Lower suspicion for ACS and trop negative.  CXR with edema.  Back pain likely muscular, no s/sx concerning for cauda equina/SEA/spinal abscess to warrant emergent MRI at this time.   Favor volume overload as etiology, initiating diuresis.   Considered PE, but favor volume status as more likely cause of presentation.  Agree with admission to Hospital Medicine.  Hypoglycemia resolved with oral intake.            ED Course as of 05/12/23 2213   Fri May 12, 2023   1851 Glucose(!): 60  Hypoglycemia  [AB]      ED Course User Index  [AB] Estevan Velazquez MD                 Clinical Impression:   Final diagnoses:  [M79.89] Leg swelling  [J81.0] Acute pulmonary edema (Primary)  [Z94.4] Liver transplant status        ED Disposition Condition    Observation Stable                Tonya Damon PA-C  23       Estevan Velazquez MD  23 [id number]       Estevan Velazquez MD  23 0017

## 2023-05-13 PROBLEM — R19.7 DIARRHEA: Status: RESOLVED | Noted: 2020-11-13 | Resolved: 2023-05-13

## 2023-05-13 PROBLEM — S91.339A PENETRATING FOOT WOUND: Status: RESOLVED | Noted: 2021-10-11 | Resolved: 2023-05-13

## 2023-05-13 PROBLEM — R10.9 ABDOMINAL PAIN: Status: RESOLVED | Noted: 2020-01-23 | Resolved: 2023-05-13

## 2023-05-13 PROBLEM — I73.9 PAD (PERIPHERAL ARTERY DISEASE): Chronic | Status: ACTIVE | Noted: 2017-08-30

## 2023-05-13 PROBLEM — I99.8 ISCHEMIC LEG: Status: RESOLVED | Noted: 2017-08-30 | Resolved: 2023-05-13

## 2023-05-13 PROBLEM — M46.1 SACROILIITIS: Status: RESOLVED | Noted: 2019-12-02 | Resolved: 2023-05-13

## 2023-05-13 PROBLEM — E11.628 DIABETIC FOOT INFECTION: Status: RESOLVED | Noted: 2021-10-11 | Resolved: 2023-05-13

## 2023-05-13 PROBLEM — E83.42 HYPOMAGNESEMIA: Status: ACTIVE | Noted: 2023-05-13

## 2023-05-13 PROBLEM — L08.9 DIABETIC FOOT INFECTION: Status: RESOLVED | Noted: 2021-10-11 | Resolved: 2023-05-13

## 2023-05-13 PROBLEM — R74.01 TRANSAMINITIS: Status: RESOLVED | Noted: 2020-11-11 | Resolved: 2023-05-13

## 2023-05-13 PROBLEM — R10.31 RIGHT LOWER QUADRANT ABDOMINAL PAIN: Status: RESOLVED | Noted: 2020-11-12 | Resolved: 2023-05-13

## 2023-05-13 LAB
ANION GAP SERPL CALC-SCNC: 11 MMOL/L (ref 8–16)
BUN SERPL-MCNC: 14 MG/DL (ref 8–23)
CALCIUM SERPL-MCNC: 9.7 MG/DL (ref 8.7–10.5)
CHLORIDE SERPL-SCNC: 102 MMOL/L (ref 95–110)
CHOLEST SERPL-MCNC: 156 MG/DL (ref 120–199)
CHOLEST/HDLC SERPL: 5.4 {RATIO} (ref 2–5)
CO2 SERPL-SCNC: 29 MMOL/L (ref 23–29)
CREAT SERPL-MCNC: 1.1 MG/DL (ref 0.5–1.4)
EST. GFR  (NO RACE VARIABLE): >60 ML/MIN/1.73 M^2
ESTIMATED AVG GLUCOSE: 148 MG/DL (ref 68–131)
GLUCOSE SERPL-MCNC: 105 MG/DL (ref 70–110)
HBA1C MFR BLD: 6.8 % (ref 4–5.6)
HDLC SERPL-MCNC: 29 MG/DL (ref 40–75)
HDLC SERPL: 18.6 % (ref 20–50)
IRON SERPL-MCNC: 43 UG/DL (ref 45–160)
LDLC SERPL CALC-MCNC: 84.4 MG/DL (ref 63–159)
MAGNESIUM SERPL-MCNC: 1.5 MG/DL (ref 1.6–2.6)
NONHDLC SERPL-MCNC: 127 MG/DL
PHOSPHATE SERPL-MCNC: 3.2 MG/DL (ref 2.7–4.5)
POCT GLUCOSE: 103 MG/DL (ref 70–110)
POCT GLUCOSE: 111 MG/DL (ref 70–110)
POCT GLUCOSE: 118 MG/DL (ref 70–110)
POCT GLUCOSE: 178 MG/DL (ref 70–110)
POTASSIUM SERPL-SCNC: 4.1 MMOL/L (ref 3.5–5.1)
SODIUM SERPL-SCNC: 142 MMOL/L (ref 136–145)
T4 FREE SERPL-MCNC: 1.05 NG/DL (ref 0.71–1.51)
TACROLIMUS BLD-MCNC: 5.8 NG/ML (ref 5–15)
TRIGL SERPL-MCNC: 213 MG/DL (ref 30–150)
TSH SERPL DL<=0.005 MIU/L-ACNC: 1.29 UIU/ML (ref 0.4–4)

## 2023-05-13 PROCEDURE — 84100 ASSAY OF PHOSPHORUS: CPT | Performed by: HOSPITALIST

## 2023-05-13 PROCEDURE — 80197 ASSAY OF TACROLIMUS: CPT | Performed by: HOSPITALIST

## 2023-05-13 PROCEDURE — 80048 BASIC METABOLIC PNL TOTAL CA: CPT | Performed by: HOSPITALIST

## 2023-05-13 PROCEDURE — 99233 SBSQ HOSP IP/OBS HIGH 50: CPT | Mod: ,,, | Performed by: STUDENT IN AN ORGANIZED HEALTH CARE EDUCATION/TRAINING PROGRAM

## 2023-05-13 PROCEDURE — 63600175 PHARM REV CODE 636 W HCPCS: Performed by: STUDENT IN AN ORGANIZED HEALTH CARE EDUCATION/TRAINING PROGRAM

## 2023-05-13 PROCEDURE — 63600175 PHARM REV CODE 636 W HCPCS: Performed by: HOSPITALIST

## 2023-05-13 PROCEDURE — 99223 PR INITIAL HOSPITAL CARE,LEVL III: ICD-10-PCS | Mod: GC,,, | Performed by: INTERNAL MEDICINE

## 2023-05-13 PROCEDURE — 25000003 PHARM REV CODE 250: Performed by: STUDENT IN AN ORGANIZED HEALTH CARE EDUCATION/TRAINING PROGRAM

## 2023-05-13 PROCEDURE — 25000003 PHARM REV CODE 250: Performed by: HOSPITALIST

## 2023-05-13 PROCEDURE — 83036 HEMOGLOBIN GLYCOSYLATED A1C: CPT | Performed by: HOSPITALIST

## 2023-05-13 PROCEDURE — 84443 ASSAY THYROID STIM HORMONE: CPT | Performed by: HOSPITALIST

## 2023-05-13 PROCEDURE — 84439 ASSAY OF FREE THYROXINE: CPT | Performed by: HOSPITALIST

## 2023-05-13 PROCEDURE — 99223 1ST HOSP IP/OBS HIGH 75: CPT | Mod: GC,,, | Performed by: INTERNAL MEDICINE

## 2023-05-13 PROCEDURE — 99233 PR SUBSEQUENT HOSPITAL CARE,LEVL III: ICD-10-PCS | Mod: ,,, | Performed by: STUDENT IN AN ORGANIZED HEALTH CARE EDUCATION/TRAINING PROGRAM

## 2023-05-13 PROCEDURE — 36415 COLL VENOUS BLD VENIPUNCTURE: CPT | Performed by: HOSPITALIST

## 2023-05-13 PROCEDURE — 80061 LIPID PANEL: CPT | Performed by: HOSPITALIST

## 2023-05-13 PROCEDURE — 20600001 HC STEP DOWN PRIVATE ROOM

## 2023-05-13 PROCEDURE — 83735 ASSAY OF MAGNESIUM: CPT | Performed by: HOSPITALIST

## 2023-05-13 PROCEDURE — 83540 ASSAY OF IRON: CPT | Performed by: HOSPITALIST

## 2023-05-13 RX ORDER — ISOSORBIDE DINITRATE 20 MG/1
20 TABLET ORAL 3 TIMES DAILY
Status: DISCONTINUED | OUTPATIENT
Start: 2023-05-13 | End: 2023-05-21 | Stop reason: HOSPADM

## 2023-05-13 RX ORDER — INSULIN ASPART 100 [IU]/ML
9 INJECTION, SOLUTION INTRAVENOUS; SUBCUTANEOUS
Status: DISCONTINUED | OUTPATIENT
Start: 2023-05-13 | End: 2023-05-21 | Stop reason: HOSPADM

## 2023-05-13 RX ORDER — HYDRALAZINE HYDROCHLORIDE 50 MG/1
50 TABLET, FILM COATED ORAL 3 TIMES DAILY
Status: DISCONTINUED | OUTPATIENT
Start: 2023-05-13 | End: 2023-05-21 | Stop reason: HOSPADM

## 2023-05-13 RX ORDER — POLYETHYLENE GLYCOL 3350 17 G/17G
17 POWDER, FOR SOLUTION ORAL 2 TIMES DAILY PRN
Status: DISCONTINUED | OUTPATIENT
Start: 2023-05-13 | End: 2023-05-21 | Stop reason: HOSPADM

## 2023-05-13 RX ORDER — MAGNESIUM SULFATE HEPTAHYDRATE 40 MG/ML
2 INJECTION, SOLUTION INTRAVENOUS ONCE
Status: COMPLETED | OUTPATIENT
Start: 2023-05-13 | End: 2023-05-13

## 2023-05-13 RX ORDER — OXYCODONE AND ACETAMINOPHEN 5; 325 MG/1; MG/1
1 TABLET ORAL EVERY 4 HOURS PRN
Status: DISCONTINUED | OUTPATIENT
Start: 2023-05-13 | End: 2023-05-21 | Stop reason: HOSPADM

## 2023-05-13 RX ORDER — FUROSEMIDE 10 MG/ML
60 INJECTION INTRAMUSCULAR; INTRAVENOUS 2 TIMES DAILY
Status: DISPENSED | OUTPATIENT
Start: 2023-05-13 | End: 2023-05-15

## 2023-05-13 RX ORDER — ACETAMINOPHEN 325 MG/1
650 TABLET ORAL EVERY 4 HOURS PRN
Status: DISCONTINUED | OUTPATIENT
Start: 2023-05-13 | End: 2023-05-21 | Stop reason: HOSPADM

## 2023-05-13 RX ORDER — OXYCODONE AND ACETAMINOPHEN 10; 325 MG/1; MG/1
1 TABLET ORAL EVERY 4 HOURS PRN
Status: DISCONTINUED | OUTPATIENT
Start: 2023-05-13 | End: 2023-05-21 | Stop reason: HOSPADM

## 2023-05-13 RX ADMIN — INSULIN ASPART 9 UNITS: 100 INJECTION, SOLUTION INTRAVENOUS; SUBCUTANEOUS at 11:05

## 2023-05-13 RX ADMIN — OXYCODONE AND ACETAMINOPHEN 1 TABLET: 10; 325 TABLET ORAL at 09:05

## 2023-05-13 RX ADMIN — ISOSORBIDE DINITRATE 20 MG: 20 TABLET ORAL at 01:05

## 2023-05-13 RX ADMIN — ISOSORBIDE DINITRATE 20 MG: 20 TABLET ORAL at 09:05

## 2023-05-13 RX ADMIN — SERTRALINE 100 MG: 100 TABLET, FILM COATED ORAL at 09:05

## 2023-05-13 RX ADMIN — INSULIN ASPART 9 UNITS: 100 INJECTION, SOLUTION INTRAVENOUS; SUBCUTANEOUS at 03:05

## 2023-05-13 RX ADMIN — ALLOPURINOL 100 MG: 300 TABLET ORAL at 09:05

## 2023-05-13 RX ADMIN — INSULIN DETEMIR 20 UNITS: 100 INJECTION, SOLUTION SUBCUTANEOUS at 11:05

## 2023-05-13 RX ADMIN — THERA TABS 1 TABLET: TAB at 09:05

## 2023-05-13 RX ADMIN — Medication 1 TABLET: at 09:05

## 2023-05-13 RX ADMIN — HYDRALAZINE HYDROCHLORIDE 50 MG: 50 TABLET ORAL at 09:05

## 2023-05-13 RX ADMIN — FUROSEMIDE 60 MG: 10 INJECTION, SOLUTION INTRAMUSCULAR; INTRAVENOUS at 05:05

## 2023-05-13 RX ADMIN — TACROLIMUS 1 MG: 1 CAPSULE ORAL at 06:05

## 2023-05-13 RX ADMIN — INSULIN ASPART 1 UNITS: 100 INJECTION, SOLUTION INTRAVENOUS; SUBCUTANEOUS at 09:05

## 2023-05-13 RX ADMIN — ENOXAPARIN SODIUM 40 MG: 40 INJECTION SUBCUTANEOUS at 04:05

## 2023-05-13 RX ADMIN — TRAZODONE HYDROCHLORIDE 50 MG: 50 TABLET ORAL at 01:05

## 2023-05-13 RX ADMIN — NIFEDIPINE 90 MG: 60 TABLET, FILM COATED, EXTENDED RELEASE ORAL at 09:05

## 2023-05-13 RX ADMIN — TACROLIMUS 2 MG: 1 CAPSULE ORAL at 09:05

## 2023-05-13 RX ADMIN — ATORVASTATIN CALCIUM 20 MG: 20 TABLET, FILM COATED ORAL at 09:05

## 2023-05-13 RX ADMIN — TRAZODONE HYDROCHLORIDE 50 MG: 50 TABLET ORAL at 09:05

## 2023-05-13 RX ADMIN — LEVOTHYROXINE SODIUM 88 MCG: 88 TABLET ORAL at 06:05

## 2023-05-13 RX ADMIN — ALLOPURINOL 100 MG: 300 TABLET ORAL at 01:05

## 2023-05-13 RX ADMIN — GABAPENTIN 800 MG: 400 CAPSULE ORAL at 09:05

## 2023-05-13 RX ADMIN — ISOSORBIDE DINITRATE 20 MG: 20 TABLET ORAL at 03:05

## 2023-05-13 RX ADMIN — ASPIRIN 81 MG CHEWABLE TABLET 81 MG: 81 TABLET CHEWABLE at 09:05

## 2023-05-13 RX ADMIN — POLYETHYLENE GLYCOL 3350 17 G: 17 POWDER, FOR SOLUTION ORAL at 09:05

## 2023-05-13 RX ADMIN — HYDRALAZINE HYDROCHLORIDE 25 MG: 25 TABLET, FILM COATED ORAL at 01:05

## 2023-05-13 RX ADMIN — OXYCODONE AND ACETAMINOPHEN 1 TABLET: 10; 325 TABLET ORAL at 03:05

## 2023-05-13 RX ADMIN — TACROLIMUS 1 MG: 1 CAPSULE ORAL at 01:05

## 2023-05-13 RX ADMIN — LISINOPRIL 40 MG: 20 TABLET ORAL at 09:05

## 2023-05-13 RX ADMIN — FUROSEMIDE 60 MG: 10 INJECTION, SOLUTION INTRAMUSCULAR; INTRAVENOUS at 09:05

## 2023-05-13 RX ADMIN — Medication 1 TABLET: at 01:05

## 2023-05-13 RX ADMIN — MAGNESIUM SULFATE 2 G: 2 INJECTION INTRAVENOUS at 10:05

## 2023-05-13 RX ADMIN — GABAPENTIN 800 MG: 400 CAPSULE ORAL at 03:05

## 2023-05-13 RX ADMIN — INSULIN DETEMIR 20 UNITS: 100 INJECTION, SOLUTION SUBCUTANEOUS at 09:05

## 2023-05-13 RX ADMIN — INSULIN ASPART 9 UNITS: 100 INJECTION, SOLUTION INTRAVENOUS; SUBCUTANEOUS at 08:05

## 2023-05-13 RX ADMIN — OXYCODONE AND ACETAMINOPHEN 1 TABLET: 10; 325 TABLET ORAL at 10:05

## 2023-05-13 RX ADMIN — HYDRALAZINE HYDROCHLORIDE 50 MG: 50 TABLET ORAL at 03:05

## 2023-05-13 NOTE — ASSESSMENT & PLAN NOTE
Patient is identified as having Unknown presumed DIASTOLIC CHF - ECHO IS PENDING heart failure that is Acute. CHF is currently uncontrolled due to Continued edema of extremities and Pulmonary edema/pleural effusion on CXR. Latest ECHO performed and demonstrates- No results found for this or any previous visit.  . Continue Beta Blocker, ACE/ARB, Furosemide and Nitrate/Vasodilator and monitor clinical status closely. Monitor on telemetry. Patient is on CHF pathway.  Monitor strict Is&Os and daily weights.  Place on fluid restriction of 1.5 L. Continue to stress to patient importance of self efficacy and  on diet for CHF. Last BNP reviewed- and noted below   Recent Labs   Lab 05/12/23  1802   BNP 46   .    Check repeat troponin in AM  Keep on Tele monitoring  Start Lasix 60mg IV BID   Change hydralazine to 25mg TID and start ISDN due to uncontrolled Htn  Restart metoprolol in 48hrs, need to obtain EF 1st. Ensure no new systolic dysfunction.   Continue Lisinopril 40mg po daily

## 2023-05-13 NOTE — CONSULTS
"  [facility] - Cardiology Stepdown  Adult Nutrition  Consult Note    SUMMARY     Recommendations    Continue current cardiac diet with fluid per MD. Add diabetic diet restrictions.   If PO intake <50%, add Boost Glucose BID.   RD following.    Goals: Continue meeting % EEN/EPN by next RD f/u.  Nutrition Goal Status: new  Communication of RD Recs:  (POC)    Assessment and Plan    Nutrition Problem  Limited adherence to nutrition related recommendations    Related to (etiology):   Pt reporting increased sodium intake    Signs and Symptoms (as evidenced by):   +2 mild edema in legs and +3 moderate edema present in feet    Interventions/Recommendations (treatment strategy):  Collaboration of nutrition care with other providers  Content related nutrition education    Nutrition Diagnosis Status:   New    Reason for Assessment    Reason For Assessment: consult  Diagnosis:  (CHF)  Relevant Medical History: HTN, H2DM, s/p liver tx (10/4/16), CKD 3, PAD, gout  Interdisciplinary Rounds: did not attend  General Information Comments: RD consulted for salt and fluid education. Unable to speak with pt 2/2 being unarousable to stimuli during time of RD visit attempt and no one else present at bedside. Unsure intake PTA. Noted pt reports increased sodium intake lately. Noted son works at [employer] and brings home fried catfish plate frequently for dinner and at [employer] brought in by wife after initiate MD visit . Current PO intake 100% per RN. No issues with n/v/d/c/chewing/swallowing. UBW fluctuates between 258-266#. Noted +2 mild edema in legs and + 3 moderate edema in feet. No s/s of malnutrition, appears well-nourished. "Heart Failure Nutrition Therapy" handout left at bedside for pt to review. RD to follow-up and answer any questions at next RD f/u if warranted. LBM 5/12.  Nutrition Discharge Planning: Cardiac/diabetic    Nutrition Risk Screen    Nutrition Risk Screen: no indicators " "present    Nutrition/Diet History    Food Allergies: NKFA  Factors Affecting Nutritional Intake: None identified at this time    Anthropometrics    Temp: 98.2 °F (36.8 °C)  Height Method: Stated  Height: 6' 1" (185.4 cm)  Height (inches): 73 in  Weight Method: Standard Scale  Weight: 120.7 kg (266 lb 1.5 oz)  Weight (lb): 266.1 lb  Ideal Body Weight (IBW), Male: 184 lb  % Ideal Body Weight, Male (lb): 144.62 %  BMI (Calculated): 35.1  BMI Grade: 35 - 39.9 - obesity - grade II    Lab/Procedures/Meds    Pertinent Labs Reviewed: reviewed  Pertinent Labs Comments: A1C 6.8,   Pertinent Medications Reviewed: reviewed  Pertinent Medications Comments: aspirin, atorvastatin, calcium-vit D3, furosemide, gabapentin, insulin, levothryoxine, MVI, tacrolimus    Estimated/Assessed Needs    Weight Used For Calorie Calculations: 120.7 kg (266 lb 1.5 oz)  Energy Calorie Requirements (kcal): 2046 kcals  Energy Need Method: [place] (MSJ x 1.0 PAL)  Protein Requirements: 73-97 g (0.6-0.8 g/kg)  Weight Used For Protein Calculations: 120.7 kg (266 lb 1.5 oz)  Fluid Requirements (mL): 1 mL/kcal or fluid per MD  Estimated Fluid Requirement Method: RDA Method  RDA Method (mL): 2046  CHO Requirement: 255 g    Nutrition Prescription Ordered    Current Diet Order: Cardiac, 1500 mL    Evaluation of Received Nutrient/Fluid Intake    I/O: -1.5 L since admit  Energy Calories Required: meeting needs  Protein Required: meeting needs  Tolerance: tolerating  % Intake of Estimated Energy Needs: 75 - 100 %  % Meal Intake: 75 - 100 %    Nutrition Risk    Level of Risk/Frequency of Follow-up:  (1 time/week)     Monitor and Evaluation    Food and Nutrient Intake: energy intake, food and beverage intake  Food and Nutrient Adminstration: diet order  Knowledge/Beliefs/Attitudes: beliefs and attitudes, food and nutrition knowledge/skill  Physical Activity and Function: nutrition-related ADLs and IADLs  Anthropometric Measurements: body mass index, " weight change, weight, height/length  Biochemical Data, Medical Tests and Procedures: lipid profile, inflammatory profile, glucose/endocrine profile, gastrointestinal profile, electrolyte and renal panel  Nutrition-Focused Physical Findings: overall appearance     Nutrition Follow-Up    RD Follow-up?: Yes

## 2023-05-13 NOTE — ASSESSMENT & PLAN NOTE
Patient's FSGs are controlled on current medication regimen.  Last A1c reviewed-   Lab Results   Component Value Date    HGBA1C 6.8 (H) 05/13/2023     Most recent fingerstick glucose reviewed-   Recent Labs   Lab 05/12/23  2115 05/13/23  0537 05/13/23  0755   POCTGLUCOSE 101 103 111*     Current correctional scale  Medium  Maintain anti-hyperglycemic dose as follows-   Antihyperglycemics (From admission, onward)    Start     Stop Route Frequency Ordered    05/13/23 0900  insulin detemir U-100 (Levemir) pen 20 Units         -- SubQ Daily 05/13/23 0712    05/13/23 0715  insulin aspart U-100 pen 9 Units         -- SubQ 3 times daily with meals 05/13/23 0712    05/13/23 0057  insulin aspart U-100 pen 1-10 Units         -- SubQ Before meals & nightly PRN 05/12/23 2358        Hold Oral hypoglycemics while patient is in the hospital.    Mild hypoglycemia on presentation, resolved after snack on BMP testing  Start glucose monitoring, dose reduced basal/prandial insulin titrate to maintain goal glucose 140-180s  Moderate dose sliding scale.

## 2023-05-13 NOTE — PLAN OF CARE
Recommendations     Continue current cardiac diet with fluid per MD. Add diabetic diet restrictions.   If PO intake <50%, add Boost Glucose BID.   RD following.     Goals: Continue meeting % EEN/EPN by next RD f/u.  Nutrition Goal Status: new  Communication of RD Recs:  (POC)    Yasmine Gillette RDN,LDN

## 2023-05-13 NOTE — ASSESSMENT & PLAN NOTE
Patient is identified as having Unknown presumed DIASTOLIC CHF - ECHO IS PENDING heart failure that is Acute. CHF is currently uncontrolled due to Continued edema of extremities and Pulmonary edema/pleural effusion on CXR.  Continue Beta Blocker, ACE/ARB, Furosemide and Nitrate/Vasodilator and monitor clinical status closely. Monitor on telemetry. Patient is on CHF pathway.  Monitor strict Is&Os and daily weights.  Place on fluid restriction of 1.5 L. Continue to stress to patient importance of self efficacy and  on diet for CHF. Last BNP reviewed- and noted below   Recent Labs   Lab 05/12/23  1802   BNP 46   - Keep on Tele monitoring  - Continue Lasix 60mg IV BID   - Increase hydralazine to 50mg TID and start ISDN 20mg TID  - Restart metoprolol in 48hrs, need to obtain EF 1st. Ensure no new systolic dysfunction.   - Continue Lisinopril 40mg po daily

## 2023-05-13 NOTE — ASSESSMENT & PLAN NOTE
Bilateral neuropathy at feet - continue gabapentin  Also has chronic LUE pain, left hand numbness - has had MRI cervical spine this year with some neuro-foraminal stenosis

## 2023-05-13 NOTE — PROGRESS NOTES
Steven Veliz - Cardiology [facility] Medicine  Progress Note    Patient Name: Vick Gonzalez  MRN: [id number]  Patient Class: IP- Inpatient   Admission Date: 5/12/2023  Length of Stay: 0 days  Attending Physician: Sailaja Ashley MD  Primary Care Provider: Emanuel Lopez MD        Subjective:     Principal Problem:Acute congestive heart failure        HPI:  67-year-old  man with a history of alcoholic liver disease status post liver transplant, HCV status post viral treatment, type 2 diabetes on insulin hypertension, gout, hypothyroidism and PAD presents the emergency department with complaints shortness of breath and peripheral edema.    He reports that over.  Week he has developed worsening progressive lower extremity edema and has noted intermittent dyspnea on exertion and having some bouts acute dyspnea that occurred her randomly at rest.  He denies chest pain at this time , no chest pressure.   No reported his of CAD or heart failure in the past.  He states prior to his liver transplant that hes experienced peripheral edema.   He reports adherence to home medications/antihypertensive regimen.  Blood pressure elevated on vitals today.  He also feels increased abdominal girth.     He is non-smoker, no recent alcohol use, no drug use.  He reports no increased fluid intake, but diet discussion states his son has been working at [employer] and bringing home fried catfish dinner plate frequently over recent weeks. Patient later eating a taco his wife brought him after my initial visit, has had increased sodium intake reported.     ED Treatment: Lasix 40mg IV x 1 - Lidocaine patch, oxycodone 5mg.           Overview/Hospital Course:  No issues since admission. Diuersing well      Interval History: no acute events overnight. Patient reports diuresing well. Reports more ease with laying on his side. Reports less edema around his waist    Review of Systems    Constitutional:  Positive for appetite change (decrease). Negative for chills and fever.   HENT:  Negative for congestion and sore throat.    Eyes:  Negative for photophobia and visual disturbance.   Respiratory:  Positive for shortness of breath. Negative for cough and chest tightness.    Cardiovascular:  Positive for leg swelling. Negative for chest pain and palpitations.   Gastrointestinal:  Positive for abdominal distention. Negative for abdominal pain, blood in stool, constipation, diarrhea, nausea and vomiting.   Endocrine: Negative for cold intolerance and heat intolerance.   Genitourinary:  Negative for dysuria and hematuria.   Musculoskeletal:  Negative for arthralgias and myalgias.   Skin:  Negative for rash and wound.   Allergic/Immunologic: Negative for environmental allergies and food allergies.   Neurological:  Negative for seizures and numbness.   Hematological:  Negative for adenopathy. Does not bruise/bleed easily.   Psychiatric/Behavioral:  Negative for hallucinations and suicidal ideas.    Objective:     Vital Signs (Most Recent):  Temp: 98.2 °F (36.8 °C) (05/13/23 0751)  Pulse: 85 (05/13/23 0751)  Resp: 19 (05/13/23 1027)  BP: 122/60 (05/13/23 0751)  SpO2: (!) 9 % (05/13/23 0751) Vital Signs (24h Range):  Temp:  [97.5 °F (36.4 °C)-98.2 °F (36.8 °C)] 98.2 °F (36.8 °C)  Pulse:  [65-85] 85  Resp:  [16-20] 19  SpO2:  [9 %-100 %] 9 %  BP: (122-188)/() 122/60     Weight: 120.7 kg (266 lb 1.5 oz)  Body mass index is 35.11 kg/m².    Intake/Output Summary (Last 24 hours) at 5/13/2023 1115  Last data filed at 5/13/2023 0624  Gross per 24 hour   Intake 300 ml   Output 1750 ml   Net -1450 ml         Physical Exam  Constitutional:       Appearance: He is well-developed.   HENT:      Head: Normocephalic and atraumatic.   Eyes:      Conjunctiva/sclera: Conjunctivae normal.      Pupils: Pupils are equal, round, and reactive to light.   Neck:      Thyroid: No thyromegaly.      Vascular: No JVD.    Cardiovascular:      Rate and Rhythm: Normal rate and regular rhythm.      Heart sounds: Normal heart sounds. No murmur heard.    No friction rub. No gallop.   Pulmonary:      Effort: Pulmonary effort is normal. No respiratory distress.      Breath sounds: Examination of the right-lower field reveals rales. Rales present. No wheezing.   Abdominal:      General: Bowel sounds are normal. There is no distension.      Palpations: Abdomen is soft. There is no mass.      Tenderness: There is no abdominal tenderness. There is no guarding or rebound.      Hernia: No hernia is present.      Comments: Hernia     Musculoskeletal:         General: No deformity. Normal range of motion.      Cervical back: Normal range of motion and neck supple.      Right lower leg: Edema present.      Left lower leg: Edema present.   Skin:     General: Skin is warm and dry.   Neurological:      Mental Status: He is alert and oriented to person, place, and time.      Cranial Nerves: No cranial nerve deficit.   Psychiatric:         Behavior: Behavior normal.           Significant Labs: All pertinent labs within the past 24 hours have been reviewed.  CBC:   Recent Labs   Lab 05/12/23  1802   WBC 7.99   HGB 11.7*   HCT 38.2*   *     CMP:   Recent Labs   Lab 05/12/23  1802 05/13/23  0530    142   K 4.2 4.1    102   CO2 26 29   GLU 60* 105   BUN 15 14   CREATININE 1.2 1.1   CALCIUM 9.6 9.7   PROT 7.1  --    ALBUMIN 3.8  --    BILITOT 0.5  --    ALKPHOS 62  --    AST 30  --    ALT 26  --    ANIONGAP 10 11       Significant Imaging: I have reviewed all pertinent imaging results/findings within the past 24 hours.      Assessment/Plan:      * Acute congestive heart failure  Patient is identified as having Unknown presumed DIASTOLIC CHF - ECHO IS PENDING heart failure that is Acute. CHF is currently uncontrolled due to Continued edema of extremities and Pulmonary edema/pleural effusion on CXR.  Continue Beta Blocker, ACE/ARB,  Furosemide and Nitrate/Vasodilator and monitor clinical status closely. Monitor on telemetry. Patient is on CHF pathway.  Monitor strict Is&Os and daily weights.  Place on fluid restriction of 1.5 L. Continue to stress to patient importance of self efficacy and  on diet for CHF. Last BNP reviewed- and noted below   Recent Labs   Lab 05/12/23  1802   BNP 46   - Keep on Tele monitoring  - Continue Lasix 60mg IV BID   - Increase hydralazine to 50mg TID and start ISDN 20mg TID  - Restart metoprolol in 48hrs, need to obtain EF 1st. Ensure no new systolic dysfunction.   - Continue Lisinopril 40mg po daily       Hypomagnesemia  Replete magnesium prn      Diabetic polyneuropathy associated with type 2 diabetes mellitus  Bilateral neuropathy at feet - continue gabapentin  Also has chronic LUE pain, left hand numbness - has had MRI cervical spine this year with some neuro-foraminal stenosis      PAD (peripheral artery disease)  Continue aspirin/statin      CKD (chronic kidney disease), stage III  Monitor daily renal function while on diuretics      S/P liver transplant  Continue tacrolimus 2mg in AM, 1mg in evening  - check tacrolimus level in AM  - Hepatology following    Type 2 diabetes mellitus with diabetic polyneuropathy, with long-term current use of insulin  Patient's FSGs are controlled on current medication regimen.  Last A1c reviewed-   Lab Results   Component Value Date    HGBA1C 6.8 (H) 05/13/2023     Most recent fingerstick glucose reviewed-   Recent Labs   Lab 05/12/23  2115 05/13/23  0537 05/13/23  0755   POCTGLUCOSE 101 103 111*     Current correctional scale  Medium  Maintain anti-hyperglycemic dose as follows-   Antihyperglycemics (From admission, onward)    Start     Stop Route Frequency Ordered    05/13/23 0900  insulin detemir U-100 (Levemir) pen 20 Units         -- SubQ Daily 05/13/23 0712    05/13/23 0715  insulin aspart U-100 pen 9 Units         -- SubQ 3 times daily with meals 05/13/23 0712     05/13/23 0057  insulin aspart U-100 pen 1-10 Units         -- SubQ Before meals & nightly PRN 05/12/23 2358        Hold Oral hypoglycemics while patient is in the hospital.    Mild hypoglycemia on presentation, resolved after snack on BMP testing  Start glucose monitoring, dose reduced basal/prandial insulin titrate to maintain goal glucose 140-180s  Moderate dose sliding scale.     Essential hypertension  As per primary problem      Gout, unspecified  Chronic/stable  Continue allopurinol bid      Acquired hypothyroidism  Normal TSH/T4  - Continue home levothyroxine        VTE Risk Mitigation (From admission, onward)         Ordered     enoxaparin injection 40 mg  Daily         05/12/23 2357     IP VTE HIGH RISK PATIENT  Once         05/12/23 2357     Place sequential compression device  Until discontinued         05/12/23 2357                Discharge Planning   JOJO: 5/15/2023     Code Status: Full Code   Is the patient medically ready for discharge?: No    Reason for patient still in hospital (select all that apply): Patient trending condition                     Sailaja Ashley MD  Department of Hospital Medicine   [facility] - Cardiology Stepdown

## 2023-05-13 NOTE — H&P
Steven Veliz - Emergency Dept  [facility] Medicine  History & Physical    Patient Name: Vick Gonzalez  MRN: [id number]  Patient Class: OP- Observation  Admission Date: 5/12/2023  Attending Physician: Sailaja Ashley MD [facility] MED X  Admitting Physician: Jhoan Gomez MD  Primary Care Provider: Emanuel Lopez MD      Patient information was obtained from patient, past medical records and ER records.     Subjective:     Principal Problem:Acute congestive heart failure    Chief Complaint:   Chief Complaint   Patient presents with    Edema     Lower leg swelling, liver xplant  2009, lower back pain, denies bowel/bladder incontinence        HPI: 67-year-old  man with a history of alcoholic liver disease status post liver transplant, HCV status post viral treatment, type 2 diabetes on insulin hypertension, gout, hypothyroidism and PAD presents the emergency department with complaints shortness of breath and peripheral edema.    He reports that over.  Week he has developed worsening progressive lower extremity edema and has noted intermittent dyspnea on exertion and having some bouts acute dyspnea that occurred her randomly at rest.  He denies chest pain at this time , no chest pressure.   No reported his of CAD or heart failure in the past.  He states prior to his liver transplant that hes experienced peripheral edema.   He reports adherence to home medications/antihypertensive regimen.  Blood pressure elevated on vitals today.  He also feels increased abdominal girth.     He is non-smoker, no recent alcohol use, no drug use.  He reports no increased fluid intake, but diet discussion states his son has been working at [employer] and bringing home fried catfish dinner plate frequently over recent weeks. Patient later eating a taco his wife brought him after my initial visit, has had increased sodium intake reported.     ED Treatment: Lasix 40mg IV x 1 - Lidocaine patch,  oxycodone 5mg.         Past Medical History:   Diagnosis Date    Acute congestive heart failure 5/12/2023    Anemia     Anxiety     Cataract     Chronic pain syndrome 07/13/2011    CKD (chronic kidney disease), stage III     Diabetes mellitus type II, uncontrolled     Discitis of lumbosacral region 01/16/2015    ED (erectile dysfunction)     Encounter for blood transfusion     Genital herpes     Gout, arthritis     History of alcohol abuse     History of hepatitis C, s/p successful Rx w/ SVR24 (cure) - 5/2018 08/23/2011    Completed 24wks Epclusa + RBV w/SVR12 - 2/2018  -     History of positive PPD, treatment status unknown     Pulmonary granulomas, negative sputum cultures for AFB and indeterminate quantferon test    History of substance abuse     Hypertension     Hypothyroidism     Liver replaced by transplant 08/23/2011    DATE: 12/16/2013  LIVER BIOPSY : REASON:  hep C staging  PATHOLOGY COMMITTEE NOTE/PLAN: :grade  1 / stage 1        Pancreatitis 2016    Peptic ulcer disease        Past Surgical History:   Procedure Laterality Date    ANTERIOR CERVICAL DISCECTOMY W/ FUSION N/A 8/22/2022    Procedure: Procedure: ACDF 4-7 LOS: 4.0 Anesthesia: General Blood: Type & Screen Radiology: C-Arm Microscope: ------- SNS: EMG, SEP, MEP Brace: [place] Bed: Regular Bed, Shoulder Strap Headrest:------ Position: Supine Equipment: Apprats;  Surgeon: Titi Christian MD;  Location: [facility] OR;  Service: Neurosurgery;  Laterality: N/A;  Depuy  confirmed CW 8/19  Neuro monitoring confirmed CW 8/19    CARPAL TUNNEL RELEASE Left 10/29/2021    Procedure: RELEASE, CARPAL TUNNEL;  Surgeon: Jameson Alejo Jr., MD;  Location: [facility] OR;  Service: Orthopedics;  Laterality: Left;    CHOLECYSTECTOMY      DECOMPRESSION OF CERVICAL SPINE BY ANTERIOR APPROACH WITH FUSION  8/22/2022    Procedure: DECOMPRESSION AND FUSION, SPINE, CERVICAL, ANTERIOR APPROACH;  Surgeon: Titi Christian MD;  Location: [facility] OR;  Service: Neurosurgery;;    INJECTION OF  JOINT Right 12/2/2019    Procedure: INJECTION, JOINT SI;  Surgeon: Thu Penn MD;  [facility];  Service: Pain Management;  Laterality: Right;  RT SI JNT INJ    LIVER TRANSPLANT  06/2010    SPINE SURGERY         Review of patient's allergies indicates:  No Known Allergies    No current facility-administered medications on file prior to encounter.     Current Outpatient Medications on File Prior to Encounter   Medication Sig    albuterol (VENTOLIN HFA) 90 mcg/actuation inhaler Inhale 2 puffs into the lungs every 6 (six) hours as needed for Wheezing or Shortness of Breath. Rescue    allopurinoL (ZYLOPRIM) 100 MG tablet TAKE 1 TABLET BY MOUTH TWICE A DAY    aspirin 81 MG Chew Take 1 tablet (81 mg total) by mouth once daily.    calcium carbonate-vitamin D3 (CALCIUM 600 WITH VITAMIN D3) 600 mg(1,500mg) -400 unit Chew Take 1 tablet by mouth once daily.     clotrimazole (LOTRIMIN) 1 % cream APPLY TOPICALLY TWICE A DAY    gabapentin (NEURONTIN) 800 MG tablet Take 1 tablet (800 mg total) by mouth 3 (three) times daily.    hydrALAZINE (APRESOLINE) 25 MG tablet Take 25 mg by mouth every 12 (twelve) hours.    insulin glargine U-300 conc (TOUJEO MAX U-300 SOLOSTAR) 300 unit/mL (3 mL) insulin pen Inject 40 Units into the skin once daily.    insulin lispro (HUMALOG KWIKPEN INSULIN) 100 unit/mL pen Inject 20 Units into the skin 3 (three) times daily with meals.    levothyroxine (SYNTHROID) 88 MCG tablet TAKE 1 TABLET BY MOUTH EVERY DAY    lisinopriL (PRINIVIL,ZESTRIL) 40 MG tablet TAKE 1 TABLET BY MOUTH EVERY DAY    metoprolol succinate (TOPROL-XL) 200 MG 24 hr tablet TAKE 1 TABLET BY MOUTH EVERY DAY    multivitamin (THERAGRAN) per tablet Take 1 tablet by mouth once daily.     NIFEdipine (PROCARDIA-XL) 90 MG (OSM) 24 hr tablet Take 1 tablet (90 mg total) by mouth once daily.    ondansetron (ZOFRAN-ODT) 4 MG TbDL Take 1 tablet (4 mg total) by mouth every 6 (six) hours as needed (Nausea).    rosuvastatin (CRESTOR) 5 MG  "tablet TAKE 1 TABLET BY MOUTH EVERY DAY    sertraline (ZOLOFT) 100 MG tablet TAKE 1 TABLET (100 MG TOTAL) BY MOUTH ONCE DAILY.    tacrolimus (PROGRAF) 1 MG Cap Take 2 capsules (2 mg total) by mouth every morning AND 1 capsule (1 mg total) every evening.    traZODone (DESYREL) 50 MG tablet TAKE 1 TABLET BY MOUTH EVERY EVENING.    aluminum-magnesium hydroxide-simethicone (MAALOX) 200-200-20 mg/5 mL Susp Take 30 mLs by mouth 4 (four) times daily before meals and nightly.    blood sugar diagnostic (TRUE METRIX GLUCOSE TEST STRIP) Strp USE 3 TIMES DAILY TO TEST BLOOD GLUCOSE LEVEL    blood-glucose meter Misc 1 Device by Misc.(Non-Drug; Combo Route) route 3 (three) times daily.    blood-glucose sensor (DEXCOM G6 SENSOR) Viviane Use as directed    blood-glucose transmitter (DEXCOM G6 TRANSMITTER) Viviane Use as directed    flash glucose sensor (FREESTYLE PALLAVI 2 SENSOR) Kit One sensor every 14 days    glucose 4 GM chewable tablet Take 4 tablets (16 g total) by mouth as needed for Low blood sugar (If having symptoms of blurry vision, palpitations, confusion, shakiness.  Please check sugars and if sugar below 70 please take 4 tablets and re-check sugar everry 15 minutes until sugars are above 70 and symptoms resolve.).    lancets 30 gauge Misc 1 lancet by Misc.(Non-Drug; Combo Route) route 4 (four) times daily before meals and nightly.    NOVOFINE PLUS 32 gauge x 1/6" Ndle     [DISCONTINUED] insulin aspart U-100 (NOVOLOG FLEXPEN U-100 INSULIN) 100 unit/mL (3 mL) InPn pen Inject 20 Units into the skin 3 (three) times daily with meals.     Family History       Problem Relation (Age of Onset)    Cancer Mother, Maternal Uncle (82)    Diabetes Mother, Sister    Drug abuse Daughter    Heart disease Mother          Tobacco Use    Smoking status: Former     Passive exposure: Never    Smokeless tobacco: Never   Substance and Sexual Activity    Alcohol use: No     Comment: over 5 years ago, none currently    Drug use: Not Currently     " Comment: Former cocaine use    Sexual activity: Yes     Review of Systems   Constitutional:  Positive for activity change. Negative for appetite change, chills, fatigue and fever.   HENT:  Negative for sore throat.    Respiratory:  Positive for shortness of breath.    Cardiovascular:  Positive for leg swelling. Negative for chest pain and palpitations.   Gastrointestinal:  Negative for abdominal pain, nausea and vomiting.   Genitourinary:  Negative for difficulty urinating and dysuria.   Musculoskeletal:  Negative for back pain.   Skin:  Negative for rash.   Neurological:  Negative for syncope and weakness.   Psychiatric/Behavioral:  Negative for confusion.    Objective:     Vital Signs (Most Recent):  Temp: 97.8 °F (36.6 °C) (05/12/23 1906)  Pulse: 79 (05/12/23 2327)  Resp: 20 (05/12/23 2327)  BP: (!) 163/95 (05/13/23 0114)  SpO2: 98 % (05/12/23 2327) Vital Signs (24h Range):  Temp:  [97.8 °F (36.6 °C)] 97.8 °F (36.6 °C)  Pulse:  [65-80] 79  Resp:  [16-20] 20  SpO2:  [86 %-100 %] 98 %  BP: (153-188)/() 163/95     Weight: 117.9 kg (260 lb)  Body mass index is 34.3 kg/m².     Physical Exam  Vitals and nursing note reviewed.   Constitutional:       General: He is not in acute distress.     Appearance: He is ill-appearing. He is not diaphoretic.   HENT:      Head: Normocephalic and atraumatic.      Mouth/Throat:      Mouth: Mucous membranes are moist.      Pharynx: Oropharynx is clear. No oropharyngeal exudate.   Eyes:      General: No scleral icterus.     Extraocular Movements: Extraocular movements intact.      Conjunctiva/sclera: Conjunctivae normal.      Pupils: Pupils are equal, round, and reactive to light.   Cardiovascular:      Rate and Rhythm: Normal rate and regular rhythm.      Pulses:           Radial pulses are 2+ on the right side and 2+ on the left side.        Dorsalis pedis pulses are 1+ on the right side and 1+ on the left side.      Heart sounds: No murmur heard.  Pulmonary:      Effort:  Pulmonary effort is normal. No respiratory distress.      Breath sounds: Normal breath sounds. No wheezing or rales.   Abdominal:      General: There is no distension.      Palpations: Abdomen is soft.      Tenderness: There is no abdominal tenderness. There is no guarding.   Musculoskeletal:      Cervical back: Neck supple.      Right lower le+ Pitting Edema present.      Left lower le+ Pitting Edema present.   Lymphadenopathy:      Cervical: No cervical adenopathy.   Neurological:      Mental Status: He is alert.            CRANIAL NERVES     CN III, IV, VI   Pupils are equal, round, and reactive to light.     Significant Labs: All pertinent labs within the past 24 hours have been reviewed.  CBC:   Recent Labs   Lab 23   WBC 7.99   HGB 11.7*   HCT 38.2*   *     CMP:   Recent Labs   Lab 23      K 4.2      CO2 26   GLU 60*   BUN 15   CREATININE 1.2   CALCIUM 9.6   PROT 7.1   ALBUMIN 3.8   BILITOT 0.5   ALKPHOS 62   AST 30   ALT 26   ANIONGAP 10     Cardiac Markers:   Recent Labs   Lab 23   BNP 46     Coagulation: No results for input(s): PT, INR, APTT in the last 48 hours.  Lactic Acid: No results for input(s): LACTATE in the last 48 hours.  Magnesium: No results for input(s): MG in the last 48 hours.  Troponin:   Recent Labs   Lab 23  180   TROPONINI 0.008     Urine Studies:   Recent Labs   Lab 23  1906   COLORU Yellow   APPEARANCEUA Clear   PHUR 7.0   SPECGRAV 1.020   PROTEINUA Trace*   GLUCUA Negative   KETONESU Negative   BILIRUBINUA Negative   OCCULTUA Negative   NITRITE Negative   LEUKOCYTESUR Negative       Significant Imaging: I have reviewed all pertinent imaging results/findings within the past 24 hours.    EXAMINATION:  XR CHEST PA AND LATERAL     CLINICAL HISTORY:  LE edema, NARAYANAN;     TECHNIQUE:  PA and lateral views of the chest were performed.     COMPARISON:  Cervical spine radiograph 2022, chest radiograph 2022      FINDINGS:  Lungs are symmetrically expanded with prominent interstitial markings bilaterally.  No focal area of consolidation.  No pneumothorax or evidence of significant pleural effusion.     Mediastinal structures are midline.  Cardiomediastinal silhouette is stable in size.     Postoperative changes of prior ACDF.  There is no evidence of free air beneath the hemidiaphragms.     Impression:     Findings suggesting mild pulmonary edema.  No focal consolidation.     Electronically signed by resident: Justin Patel  Date:                                            05/12/2023  Time:                                           18:37     Electronically signed by: Mio Page MD  Date:                                            05/12/2023  Time:                                           18:48              Assessment/Plan:     * Acute congestive heart failure  Patient is identified as having Unknown presumed DIASTOLIC CHF - ECHO IS PENDING heart failure that is Acute. CHF is currently uncontrolled due to Continued edema of extremities and Pulmonary edema/pleural effusion on CXR. Latest ECHO performed and demonstrates- No results found for this or any previous visit.  . Continue Beta Blocker, ACE/ARB, Furosemide and Nitrate/Vasodilator and monitor clinical status closely. Monitor on telemetry. Patient is on CHF pathway.  Monitor strict Is&Os and daily weights.  Place on fluid restriction of 1.5 L. Continue to stress to patient importance of self efficacy and  on diet for CHF. Last BNP reviewed- and noted below   Recent Labs   Lab 05/12/23  1802   BNP 46   .    Check repeat troponin in AM  Keep on Tele monitoring  Start Lasix 60mg IV BID   Change hydralazine to 25mg TID and start ISDN due to uncontrolled Htn  Restart metoprolol in 48hrs, need to obtain EF 1st. Ensure no new systolic dysfunction.   Continue Lisinopril 40mg po daily       Essential hypertension  As per primary problem      Type 2 diabetes mellitus  with diabetic polyneuropathy, with long-term current use of insulin  Patient's FSGs are controlled on current medication regimen.  Last A1c reviewed-   Lab Results   Component Value Date    HGBA1C 7.8 (H) 12/05/2022     Most recent fingerstick glucose reviewed-   Recent Labs   Lab 05/12/23 2115   POCTGLUCOSE 101     Current correctional scale  Medium  Maintain anti-hyperglycemic dose as follows-   Antihyperglycemics (From admission, onward)      Start     Stop Route Frequency Ordered    05/13/23 0900  insulin detemir U-100 (Levemir) pen 25 Units         -- SubQ Daily 05/12/23 2357 05/13/23 0715  insulin aspart U-100 pen 12 Units         -- SubQ 3 times daily with meals 05/12/23 2357 05/13/23 0057  insulin aspart U-100 pen 1-10 Units         -- SubQ Before meals & nightly PRN 05/12/23 2358          Hold Oral hypoglycemics while patient is in the hospital.    Mild hypoglycemia on presentation, resolved after snack on BMP testing  Start glucose monitoring, dose reduced basal/prandial insulin titrate to maintain goal glucose 140-180s  Moderate dose sliding scale.   Repeat A1c in AM    PAD (peripheral artery disease)  Continue aspirin/statin      S/P liver transplant  Continue tacrolimus 2mg in AM, 1mg in evening  -check tacrolimus level in AM  -consult hepatology team    Acquired hypothyroidism  Check tsh and free t4 per CHF pathway orders  Continue home levothyroxine      CKD (chronic kidney disease), stage III  Monitor daily renal function while on diuretics      Gout, unspecified  Chronic/stable  Continue allopurinol bid      Diabetic polyneuropathy associated with type 2 diabetes mellitus  Bilateral neuropathy at feet - continue gabapentin  Also has chronic LUE pain, left hand numbness - has had MRI cervical spine this year with some neuro-foraminal stenosis        VTE Risk Mitigation (From admission, onward)           Ordered     enoxaparin injection 40 mg  Daily         05/12/23 2357     IP VTE HIGH RISK  PATIENT  Once         05/12/23 2357     Place sequential compression device  Until discontinued         05/12/23 2357                       On 05/13/2023, patient should be placed in hospital observation services under my care.        Jhoan Gomez MD  Department of Hospital Medicine  [facility] - Emergency Dept

## 2023-05-13 NOTE — ASSESSMENT & PLAN NOTE
Patient's FSGs are controlled on current medication regimen.  Last A1c reviewed-   Lab Results   Component Value Date    HGBA1C 7.8 (H) 12/05/2022     Most recent fingerstick glucose reviewed-   Recent Labs   Lab 05/12/23 2115   POCTGLUCOSE 101     Current correctional scale  Medium  Maintain anti-hyperglycemic dose as follows-   Antihyperglycemics (From admission, onward)    Start     Stop Route Frequency Ordered    05/13/23 0900  insulin detemir U-100 (Levemir) pen 25 Units         -- SubQ Daily 05/12/23 2357 05/13/23 0715  insulin aspart U-100 pen 12 Units         -- SubQ 3 times daily with meals 05/12/23 2357 05/13/23 0057  insulin aspart U-100 pen 1-10 Units         -- SubQ Before meals & nightly PRN 05/12/23 2358        Hold Oral hypoglycemics while patient is in the hospital.    Mild hypoglycemia on presentation, resolved after snack on BMP testing  Start glucose monitoring, dose reduced basal/prandial insulin titrate to maintain goal glucose 140-180s  Moderate dose sliding scale.   Repeat A1c in AM

## 2023-05-13 NOTE — SUBJECTIVE & OBJECTIVE
Interval History: no acute events overnight. Patient reports diuresing well. Reports more ease with laying on his side. Reports less edema around his waist    Review of Systems   Constitutional:  Positive for appetite change (decrease). Negative for chills and fever.   HENT:  Negative for congestion and sore throat.    Eyes:  Negative for photophobia and visual disturbance.   Respiratory:  Positive for shortness of breath. Negative for cough and chest tightness.    Cardiovascular:  Positive for leg swelling. Negative for chest pain and palpitations.   Gastrointestinal:  Positive for abdominal distention. Negative for abdominal pain, blood in stool, constipation, diarrhea, nausea and vomiting.   Endocrine: Negative for cold intolerance and heat intolerance.   Genitourinary:  Negative for dysuria and hematuria.   Musculoskeletal:  Negative for arthralgias and myalgias.   Skin:  Negative for rash and wound.   Allergic/Immunologic: Negative for environmental allergies and food allergies.   Neurological:  Negative for seizures and numbness.   Hematological:  Negative for adenopathy. Does not bruise/bleed easily.   Psychiatric/Behavioral:  Negative for hallucinations and suicidal ideas.    Objective:     Vital Signs (Most Recent):  Temp: 98.2 °F (36.8 °C) (05/13/23 0751)  Pulse: 85 (05/13/23 0751)  Resp: 19 (05/13/23 1027)  BP: 122/60 (05/13/23 0751)  SpO2: (!) 9 % (05/13/23 0751) Vital Signs (24h Range):  Temp:  [97.5 °F (36.4 °C)-98.2 °F (36.8 °C)] 98.2 °F (36.8 °C)  Pulse:  [65-85] 85  Resp:  [16-20] 19  SpO2:  [9 %-100 %] 9 %  BP: (122-188)/() 122/60     Weight: 120.7 kg (266 lb 1.5 oz)  Body mass index is 35.11 kg/m².    Intake/Output Summary (Last 24 hours) at 5/13/2023 1115  Last data filed at 5/13/2023 0624  Gross per 24 hour   Intake 300 ml   Output 1750 ml   Net -1450 ml         Physical Exam  Constitutional:       Appearance: He is well-developed.   HENT:      Head: Normocephalic and atraumatic.   Eyes:       Conjunctiva/sclera: Conjunctivae normal.      Pupils: Pupils are equal, round, and reactive to light.   Neck:      Thyroid: No thyromegaly.      Vascular: No JVD.   Cardiovascular:      Rate and Rhythm: Normal rate and regular rhythm.      Heart sounds: Normal heart sounds. No murmur heard.    No friction rub. No gallop.   Pulmonary:      Effort: Pulmonary effort is normal. No respiratory distress.      Breath sounds: Examination of the right-lower field reveals rales. Rales present. No wheezing.   Abdominal:      General: Bowel sounds are normal. There is no distension.      Palpations: Abdomen is soft. There is no mass.      Tenderness: There is no abdominal tenderness. There is no guarding or rebound.      Hernia: No hernia is present.      Comments: Hernia     Musculoskeletal:         General: No deformity. Normal range of motion.      Cervical back: Normal range of motion and neck supple.      Right lower leg: Edema present.      Left lower leg: Edema present.   Skin:     General: Skin is warm and dry.   Neurological:      Mental Status: He is alert and oriented to person, place, and time.      Cranial Nerves: No cranial nerve deficit.   Psychiatric:         Behavior: Behavior normal.           Significant Labs: All pertinent labs within the past 24 hours have been reviewed.  CBC:   Recent Labs   Lab 05/12/23  1802   WBC 7.99   HGB 11.7*   HCT 38.2*   *     CMP:   Recent Labs   Lab 05/12/23  1802 05/13/23  0530    142   K 4.2 4.1    102   CO2 26 29   GLU 60* 105   BUN 15 14   CREATININE 1.2 1.1   CALCIUM 9.6 9.7   PROT 7.1  --    ALBUMIN 3.8  --    BILITOT 0.5  --    ALKPHOS 62  --    AST 30  --    ALT 26  --    ANIONGAP 10 11       Significant Imaging: I have reviewed all pertinent imaging results/findings within the past 24 hours.

## 2023-05-13 NOTE — CONSULTS
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    Hepatology consulted for IS recommendations    Last seen by Jared on 01/11/2023. He is s/p SVR w/ epclusa + riba. Prior fibrosure in 2019 demonstrated F4 fibrosis but repeat in 2019 demonstrated F2. Liver biopsy 2013 demonstrated F1.     Current tacrolimus dose is 2/ 1.     VS since arrival unremarkable.  CBC w/o leukocytosis, Hgb 11.7, plts 133.  BMP w/ BUN/ Cr 14/ 1.1  LFTs unremarkable.     US liver pending, TTE pending    Objective:     Vitals:    05/13/23 0438   BP: (!) 141/65   Pulse: 83   Resp: 18   Temp: 97.5 °F (36.4 °C)         Constitutional:  not in acute distress and well developed  HENT: Head: Normal, normocephalic, atraumatic.  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    Hepatology consulted for IS recommendations    Problem List:  EtOH / HCV cirrhosis s/p OLTx (2010) c/b recurrent  fibrosis    Recommendations:  F/u US liver  Continue 2/1 tacrolimus dosing  Daily CMP and tacrolimus trough level    Thank you for involving us in the care of Vick Gonzalez. Please call with any additional questions, concerns or changes in the patient's clinical status. We will continue to follow. .    Celestino Paredes MD  Gastroenterology Fellow PGY VI  [facility]

## 2023-05-13 NOTE — HPI
67-year-old  man with a history of alcoholic liver disease status post liver transplant, HCV status post viral treatment, type 2 diabetes on insulin hypertension, gout, hypothyroidism and PAD presents the emergency department with complaints shortness of breath and peripheral edema.    He reports that over.  Week he has developed worsening progressive lower extremity edema and has noted intermittent dyspnea on exertion and having some bouts acute dyspnea that occurred her randomly at rest.  He denies chest pain at this time , no chest pressure.   No reported his of CAD or heart failure in the past.  He states prior to his liver transplant that hes experienced peripheral edema.   He reports adherence to home medications/antihypertensive regimen.  Blood pressure elevated on vitals today.  He also feels increased abdominal girth.     He is non-smoker, no recent alcohol use, no drug use.  He reports no increased fluid intake, but diet discussion states his son has been working at [employer] and bringing home fried catfish dinner plate frequently over recent weeks. Patient later eating a taco his wife brought him after my initial visit, has had increased sodium intake reported.     ED Treatment: Lasix 40mg IV x 1 - Lidocaine patch, oxycodone 5mg.

## 2023-05-13 NOTE — ASSESSMENT & PLAN NOTE
Continue tacrolimus 2mg in AM, 1mg in evening  - check tacrolimus level in AM  - Hepatology following

## 2023-05-13 NOTE — HOSPITAL COURSE
Patient admitted to hospital medicine. Diuersing well. EF 60%, CVP around 8. Holding diuretics on 5/15 given concern of overdiuresis.   However, may be a component of cardiorenal as his urine studies demonstrated pre-renal etiology and remains fluid overloaded. Resume diuresis with improvement.    Patient deemed ready for discharge. Plan discussed with pt, who was agreeable and amenable; medications were discussed and reviewed, outpatient follow-up arranged, ER precautions were given, all questions were answered to the pt's satisfaction, and Vick Gonzalez  was subsequently discharged.

## 2023-05-13 NOTE — ED NOTES
Nursing communication completed per MD order. Pt's O2 = 86% when ambulating with complaints of SOB

## 2023-05-13 NOTE — ASSESSMENT & PLAN NOTE
Continue tacrolimus 2mg in AM, 1mg in evening  -check tacrolimus level in AM  -consult hepatology team

## 2023-05-13 NOTE — SUBJECTIVE & OBJECTIVE
Past Medical History:   Diagnosis Date    Acute congestive heart failure 5/12/2023    Anemia     Anxiety     Cataract     Chronic pain syndrome 07/13/2011    CKD (chronic kidney disease), stage III     Diabetes mellitus type II, uncontrolled     Discitis of lumbosacral region 01/16/2015    ED (erectile dysfunction)     Encounter for blood transfusion     Genital herpes     Gout, arthritis     History of alcohol abuse     History of hepatitis C, s/p successful Rx w/ SVR24 (cure) - 5/2018 08/23/2011    Completed 24wks Epclusa + RBV w/SVR12 - 2/2018  -     History of positive PPD, treatment status unknown     Pulmonary granulomas, negative sputum cultures for AFB and indeterminate quantferon test    History of substance abuse     Hypertension     Hypothyroidism     Liver replaced by transplant 08/23/2011    DATE: 12/16/2013  LIVER BIOPSY : REASON:  hep C staging  PATHOLOGY COMMITTEE NOTE/PLAN: :grade  1 / stage 1        Pancreatitis 2016    Peptic ulcer disease        Past Surgical History:   Procedure Laterality Date    ANTERIOR CERVICAL DISCECTOMY W/ FUSION N/A 8/22/2022    Procedure: Procedure: ACDF 4-7 LOS: 4.0 Anesthesia: General Blood: Type & Screen Radiology: C-Arm Microscope: ------- SNS: EMG, SEP, MEP Brace: Mallie Bed: Regular Bed, Shoulder Strap Headrest:------ Position: Supine Equipment: Retrieve;  Surgeon: Titi Christian MD;  Location: [facility] OR;  Service: Neurosurgery;  Laterality: N/A;  Depuy  confirmed CW 8/19  Neuro monitoring confirmed CW 8/19    CARPAL TUNNEL RELEASE Left 10/29/2021    Procedure: RELEASE, CARPAL TUNNEL;  Surgeon: Jameson Alejo Jr., MD;  Location: [facility] OR;  Service: Orthopedics;  Laterality: Left;    CHOLECYSTECTOMY      DECOMPRESSION OF CERVICAL SPINE BY ANTERIOR APPROACH WITH FUSION  8/22/2022    Procedure: DECOMPRESSION AND FUSION, SPINE, CERVICAL, ANTERIOR APPROACH;  Surgeon: Titi Christian MD;  Location: [facility] OR;  Service: Neurosurgery;;    INJECTION OF JOINT Right 12/2/2019     Procedure: INJECTION, JOINT SI;  Surgeon: Thu Penn MD;  [facility];  Service: Pain Management;  Laterality: Right;  RT SI JNT INJ    LIVER TRANSPLANT  06/2010    SPINE SURGERY         Review of patient's allergies indicates:  No Known Allergies    No current facility-administered medications on file prior to encounter.     Current Outpatient Medications on File Prior to Encounter   Medication Sig    albuterol (VENTOLIN HFA) 90 mcg/actuation inhaler Inhale 2 puffs into the lungs every 6 (six) hours as needed for Wheezing or Shortness of Breath. Rescue    allopurinoL (ZYLOPRIM) 100 MG tablet TAKE 1 TABLET BY MOUTH TWICE A DAY    aspirin 81 MG Chew Take 1 tablet (81 mg total) by mouth once daily.    calcium carbonate-vitamin D3 (CALCIUM 600 WITH VITAMIN D3) 600 mg(1,500mg) -400 unit Chew Take 1 tablet by mouth once daily.     clotrimazole (LOTRIMIN) 1 % cream APPLY TOPICALLY TWICE A DAY    gabapentin (NEURONTIN) 800 MG tablet Take 1 tablet (800 mg total) by mouth 3 (three) times daily.    hydrALAZINE (APRESOLINE) 25 MG tablet Take 25 mg by mouth every 12 (twelve) hours.    insulin glargine U-300 conc (TOUJEO MAX U-300 SOLOSTAR) 300 unit/mL (3 mL) insulin pen Inject 40 Units into the skin once daily.    insulin lispro (HUMALOG KWIKPEN INSULIN) 100 unit/mL pen Inject 20 Units into the skin 3 (three) times daily with meals.    levothyroxine (SYNTHROID) 88 MCG tablet TAKE 1 TABLET BY MOUTH EVERY DAY    lisinopriL (PRINIVIL,ZESTRIL) 40 MG tablet TAKE 1 TABLET BY MOUTH EVERY DAY    metoprolol succinate (TOPROL-XL) 200 MG 24 hr tablet TAKE 1 TABLET BY MOUTH EVERY DAY    multivitamin (THERAGRAN) per tablet Take 1 tablet by mouth once daily.     NIFEdipine (PROCARDIA-XL) 90 MG (OSM) 24 hr tablet Take 1 tablet (90 mg total) by mouth once daily.    ondansetron (ZOFRAN-ODT) 4 MG TbDL Take 1 tablet (4 mg total) by mouth every 6 (six) hours as needed (Nausea).    rosuvastatin (CRESTOR) 5 MG tablet TAKE 1 TABLET BY  "MOUTH EVERY DAY    sertraline (ZOLOFT) 100 MG tablet TAKE 1 TABLET (100 MG TOTAL) BY MOUTH ONCE DAILY.    tacrolimus (PROGRAF) 1 MG Cap Take 2 capsules (2 mg total) by mouth every morning AND 1 capsule (1 mg total) every evening.    traZODone (DESYREL) 50 MG tablet TAKE 1 TABLET BY MOUTH EVERY EVENING.    aluminum-magnesium hydroxide-simethicone (MAALOX) 200-200-20 mg/5 mL Susp Take 30 mLs by mouth 4 (four) times daily before meals and nightly.    blood sugar diagnostic (TRUE METRIX GLUCOSE TEST STRIP) Strp USE 3 TIMES DAILY TO TEST BLOOD GLUCOSE LEVEL    blood-glucose meter Misc 1 Device by Misc.(Non-Drug; Combo Route) route 3 (three) times daily.    blood-glucose sensor (DEXCOM G6 SENSOR) Viviane Use as directed    blood-glucose transmitter (DEXCOM G6 TRANSMITTER) Viviane Use as directed    flash glucose sensor (FREESTYLE PALLAVI 2 SENSOR) Kit One sensor every 14 days    glucose 4 GM chewable tablet Take 4 tablets (16 g total) by mouth as needed for Low blood sugar (If having symptoms of blurry vision, palpitations, confusion, shakiness.  Please check sugars and if sugar below 70 please take 4 tablets and re-check sugar everry 15 minutes until sugars are above 70 and symptoms resolve.).    lancets 30 gauge Misc 1 lancet by Misc.(Non-Drug; Combo Route) route 4 (four) times daily before meals and nightly.    NOVOFINE PLUS 32 gauge x 1/6" Ndle     [DISCONTINUED] insulin aspart U-100 (NOVOLOG FLEXPEN U-100 INSULIN) 100 unit/mL (3 mL) InPn pen Inject 20 Units into the skin 3 (three) times daily with meals.     Family History       Problem Relation (Age of Onset)    Cancer Mother, Maternal Uncle (82)    Diabetes Mother, Sister    Drug abuse Daughter    Heart disease Mother          Tobacco Use    Smoking status: Former     Passive exposure: Never    Smokeless tobacco: Never   Substance and Sexual Activity    Alcohol use: No     Comment: over 5 years ago, none currently    Drug use: Not Currently     Comment: Former cocaine use "    Sexual activity: Yes     Review of Systems   Constitutional:  Positive for activity change. Negative for appetite change, chills, fatigue and fever.   HENT:  Negative for sore throat.    Respiratory:  Positive for shortness of breath.    Cardiovascular:  Positive for leg swelling. Negative for chest pain and palpitations.   Gastrointestinal:  Negative for abdominal pain, nausea and vomiting.   Genitourinary:  Negative for difficulty urinating and dysuria.   Musculoskeletal:  Negative for back pain.   Skin:  Negative for rash.   Neurological:  Negative for syncope and weakness.   Psychiatric/Behavioral:  Negative for confusion.    Objective:     Vital Signs (Most Recent):  Temp: 97.8 °F (36.6 °C) (05/12/23 1906)  Pulse: 79 (05/12/23 2327)  Resp: 20 (05/12/23 2327)  BP: (!) 163/95 (05/13/23 0114)  SpO2: 98 % (05/12/23 2327) Vital Signs (24h Range):  Temp:  [97.8 °F (36.6 °C)] 97.8 °F (36.6 °C)  Pulse:  [65-80] 79  Resp:  [16-20] 20  SpO2:  [86 %-100 %] 98 %  BP: (153-188)/() 163/95     Weight: 117.9 kg (260 lb)  Body mass index is 34.3 kg/m².     Physical Exam  Vitals and nursing note reviewed.   Constitutional:       General: He is not in acute distress.     Appearance: He is ill-appearing. He is not diaphoretic.   HENT:      Head: Normocephalic and atraumatic.      Mouth/Throat:      Mouth: Mucous membranes are moist.      Pharynx: Oropharynx is clear. No oropharyngeal exudate.   Eyes:      General: No scleral icterus.     Extraocular Movements: Extraocular movements intact.      Conjunctiva/sclera: Conjunctivae normal.      Pupils: Pupils are equal, round, and reactive to light.   Cardiovascular:      Rate and Rhythm: Normal rate and regular rhythm.      Pulses:           Radial pulses are 2+ on the right side and 2+ on the left side.        Dorsalis pedis pulses are 1+ on the right side and 1+ on the left side.      Heart sounds: No murmur heard.  Pulmonary:      Effort: Pulmonary effort is normal. No  respiratory distress.      Breath sounds: Normal breath sounds. No wheezing or rales.   Abdominal:      General: There is no distension.      Palpations: Abdomen is soft.      Tenderness: There is no abdominal tenderness. There is no guarding.   Musculoskeletal:      Cervical back: Neck supple.      Right lower le+ Pitting Edema present.      Left lower le+ Pitting Edema present.   Lymphadenopathy:      Cervical: No cervical adenopathy.   Neurological:      Mental Status: He is alert.            CRANIAL NERVES     CN III, IV, VI   Pupils are equal, round, and reactive to light.     Significant Labs: All pertinent labs within the past 24 hours have been reviewed.  CBC:   Recent Labs   Lab 23   WBC 7.99   HGB 11.7*   HCT 38.2*   *     CMP:   Recent Labs   Lab 23      K 4.2      CO2 26   GLU 60*   BUN 15   CREATININE 1.2   CALCIUM 9.6   PROT 7.1   ALBUMIN 3.8   BILITOT 0.5   ALKPHOS 62   AST 30   ALT 26   ANIONGAP 10     Cardiac Markers:   Recent Labs   Lab 23   BNP 46     Coagulation: No results for input(s): PT, INR, APTT in the last 48 hours.  Lactic Acid: No results for input(s): LACTATE in the last 48 hours.  Magnesium: No results for input(s): MG in the last 48 hours.  Troponin:   Recent Labs   Lab 23   TROPONINI 0.008     Urine Studies:   Recent Labs   Lab 23  190   COLORU Yellow   APPEARANCEUA Clear   PHUR 7.0   SPECGRAV 1.020   PROTEINUA Trace*   GLUCUA Negative   KETONESU Negative   BILIRUBINUA Negative   OCCULTUA Negative   NITRITE Negative   LEUKOCYTESUR Negative       Significant Imaging: I have reviewed all pertinent imaging results/findings within the past 24 hours.    EXAMINATION:  XR CHEST PA AND LATERAL     CLINICAL HISTORY:  LE edema, NARAYANAN;     TECHNIQUE:  PA and lateral views of the chest were performed.     COMPARISON:  Cervical spine radiograph 2022, chest radiograph 2022     FINDINGS:  Lungs are  symmetrically expanded with prominent interstitial markings bilaterally.  No focal area of consolidation.  No pneumothorax or evidence of significant pleural effusion.     Mediastinal structures are midline.  Cardiomediastinal silhouette is stable in size.     Postoperative changes of prior ACDF.  There is no evidence of free air beneath the hemidiaphragms.     Impression:     Findings suggesting mild pulmonary edema.  No focal consolidation.     Electronically signed by resident: Justin Patel  Date:                                            05/12/2023  Time:                                           18:37     Electronically signed by: Mio Page MD  Date:                                            05/12/2023  Time:                                           18:48

## 2023-05-14 LAB
ALBUMIN SERPL BCP-MCNC: 3.7 G/DL (ref 3.5–5.2)
ALP SERPL-CCNC: 75 U/L (ref 55–135)
ALT SERPL W/O P-5'-P-CCNC: 23 U/L (ref 10–44)
ANION GAP SERPL CALC-SCNC: 10 MMOL/L (ref 8–16)
AST SERPL-CCNC: 19 U/L (ref 10–40)
BILIRUB SERPL-MCNC: 0.5 MG/DL (ref 0.1–1)
BUN SERPL-MCNC: 18 MG/DL (ref 8–23)
CALCIUM SERPL-MCNC: 9.6 MG/DL (ref 8.7–10.5)
CHLORIDE SERPL-SCNC: 102 MMOL/L (ref 95–110)
CO2 SERPL-SCNC: 29 MMOL/L (ref 23–29)
CREAT SERPL-MCNC: 1.2 MG/DL (ref 0.5–1.4)
EST. GFR  (NO RACE VARIABLE): >60 ML/MIN/1.73 M^2
GLUCOSE SERPL-MCNC: 125 MG/DL (ref 70–110)
MAGNESIUM SERPL-MCNC: 1.8 MG/DL (ref 1.6–2.6)
PHOSPHATE SERPL-MCNC: 3.5 MG/DL (ref 2.7–4.5)
POCT GLUCOSE: 102 MG/DL (ref 70–110)
POCT GLUCOSE: 133 MG/DL (ref 70–110)
POCT GLUCOSE: 181 MG/DL (ref 70–110)
POTASSIUM SERPL-SCNC: 4.1 MMOL/L (ref 3.5–5.1)
PROT SERPL-MCNC: 7.1 G/DL (ref 6–8.4)
SODIUM SERPL-SCNC: 141 MMOL/L (ref 136–145)
TACROLIMUS BLD-MCNC: 5.9 NG/ML (ref 5–15)

## 2023-05-14 PROCEDURE — 83735 ASSAY OF MAGNESIUM: CPT | Performed by: HOSPITALIST

## 2023-05-14 PROCEDURE — 36415 COLL VENOUS BLD VENIPUNCTURE: CPT | Performed by: HOSPITALIST

## 2023-05-14 PROCEDURE — 25000003 PHARM REV CODE 250: Performed by: HOSPITALIST

## 2023-05-14 PROCEDURE — 25000003 PHARM REV CODE 250: Performed by: STUDENT IN AN ORGANIZED HEALTH CARE EDUCATION/TRAINING PROGRAM

## 2023-05-14 PROCEDURE — 99233 SBSQ HOSP IP/OBS HIGH 50: CPT | Mod: ,,, | Performed by: STUDENT IN AN ORGANIZED HEALTH CARE EDUCATION/TRAINING PROGRAM

## 2023-05-14 PROCEDURE — 20600001 HC STEP DOWN PRIVATE ROOM

## 2023-05-14 PROCEDURE — 99233 PR SUBSEQUENT HOSPITAL CARE,LEVL III: ICD-10-PCS | Mod: ,,, | Performed by: STUDENT IN AN ORGANIZED HEALTH CARE EDUCATION/TRAINING PROGRAM

## 2023-05-14 PROCEDURE — 80053 COMPREHEN METABOLIC PANEL: CPT | Performed by: STUDENT IN AN ORGANIZED HEALTH CARE EDUCATION/TRAINING PROGRAM

## 2023-05-14 PROCEDURE — 63600175 PHARM REV CODE 636 W HCPCS: Performed by: HOSPITALIST

## 2023-05-14 PROCEDURE — 84100 ASSAY OF PHOSPHORUS: CPT | Performed by: HOSPITALIST

## 2023-05-14 PROCEDURE — 80197 ASSAY OF TACROLIMUS: CPT | Performed by: STUDENT IN AN ORGANIZED HEALTH CARE EDUCATION/TRAINING PROGRAM

## 2023-05-14 RX ORDER — TAMSULOSIN HYDROCHLORIDE 0.4 MG/1
0.4 CAPSULE ORAL DAILY
Status: DISCONTINUED | OUTPATIENT
Start: 2023-05-14 | End: 2023-05-21 | Stop reason: HOSPADM

## 2023-05-14 RX ORDER — TAMSULOSIN HYDROCHLORIDE 0.4 MG/1
0.8 CAPSULE ORAL DAILY
Status: DISCONTINUED | OUTPATIENT
Start: 2023-05-15 | End: 2023-05-14

## 2023-05-14 RX ADMIN — OXYCODONE AND ACETAMINOPHEN 1 TABLET: 10; 325 TABLET ORAL at 04:05

## 2023-05-14 RX ADMIN — LEVOTHYROXINE SODIUM 88 MCG: 88 TABLET ORAL at 06:05

## 2023-05-14 RX ADMIN — THERA TABS 1 TABLET: TAB at 09:05

## 2023-05-14 RX ADMIN — Medication 1 TABLET: at 09:05

## 2023-05-14 RX ADMIN — METOPROLOL SUCCINATE 200 MG: 100 TABLET, EXTENDED RELEASE ORAL at 09:05

## 2023-05-14 RX ADMIN — SERTRALINE 100 MG: 100 TABLET, FILM COATED ORAL at 09:05

## 2023-05-14 RX ADMIN — INSULIN ASPART 9 UNITS: 100 INJECTION, SOLUTION INTRAVENOUS; SUBCUTANEOUS at 05:05

## 2023-05-14 RX ADMIN — ASPIRIN 81 MG CHEWABLE TABLET 81 MG: 81 TABLET CHEWABLE at 09:05

## 2023-05-14 RX ADMIN — ALLOPURINOL 100 MG: 300 TABLET ORAL at 09:05

## 2023-05-14 RX ADMIN — GABAPENTIN 800 MG: 400 CAPSULE ORAL at 09:05

## 2023-05-14 RX ADMIN — ATORVASTATIN CALCIUM 20 MG: 20 TABLET, FILM COATED ORAL at 09:05

## 2023-05-14 RX ADMIN — GABAPENTIN 800 MG: 400 CAPSULE ORAL at 03:05

## 2023-05-14 RX ADMIN — TACROLIMUS 1 MG: 1 CAPSULE ORAL at 05:05

## 2023-05-14 RX ADMIN — FUROSEMIDE 60 MG: 10 INJECTION, SOLUTION INTRAMUSCULAR; INTRAVENOUS at 09:05

## 2023-05-14 RX ADMIN — HYDRALAZINE HYDROCHLORIDE 50 MG: 50 TABLET ORAL at 09:05

## 2023-05-14 RX ADMIN — INSULIN ASPART 9 UNITS: 100 INJECTION, SOLUTION INTRAVENOUS; SUBCUTANEOUS at 08:05

## 2023-05-14 RX ADMIN — ISOSORBIDE DINITRATE 20 MG: 20 TABLET ORAL at 09:05

## 2023-05-14 RX ADMIN — TACROLIMUS 2 MG: 1 CAPSULE ORAL at 09:05

## 2023-05-14 RX ADMIN — OXYCODONE AND ACETAMINOPHEN 1 TABLET: 10; 325 TABLET ORAL at 09:05

## 2023-05-14 RX ADMIN — TRAZODONE HYDROCHLORIDE 50 MG: 50 TABLET ORAL at 09:05

## 2023-05-14 RX ADMIN — NIFEDIPINE 90 MG: 60 TABLET, FILM COATED, EXTENDED RELEASE ORAL at 09:05

## 2023-05-14 RX ADMIN — ENOXAPARIN SODIUM 40 MG: 40 INJECTION SUBCUTANEOUS at 05:05

## 2023-05-14 RX ADMIN — HYDRALAZINE HYDROCHLORIDE 50 MG: 50 TABLET ORAL at 03:05

## 2023-05-14 RX ADMIN — INSULIN DETEMIR 20 UNITS: 100 INJECTION, SOLUTION SUBCUTANEOUS at 08:05

## 2023-05-14 RX ADMIN — TAMSULOSIN HYDROCHLORIDE 0.4 MG: 0.4 CAPSULE ORAL at 05:05

## 2023-05-14 RX ADMIN — LISINOPRIL 40 MG: 20 TABLET ORAL at 09:05

## 2023-05-14 RX ADMIN — OXYCODONE AND ACETAMINOPHEN 1 TABLET: 10; 325 TABLET ORAL at 03:05

## 2023-05-14 RX ADMIN — INSULIN ASPART 9 UNITS: 100 INJECTION, SOLUTION INTRAVENOUS; SUBCUTANEOUS at 11:05

## 2023-05-14 RX ADMIN — ISOSORBIDE DINITRATE 20 MG: 20 TABLET ORAL at 03:05

## 2023-05-14 NOTE — PROGRESS NOTES
[facility] - Cardiology [facility]  Progress Note    Patient Name: Vick Gonzalez  MRN: [id number]  Patient Class: IP- Inpatient   Admission Date: 5/12/2023  Length of Stay: 1 days  Attending Physician: Sailaja Ashley MD  Primary Care Provider: Emanuel Lopez MD        Subjective:     Principal Problem:Acute congestive heart failure        HPI:  67-year-old  man with a history of alcoholic liver disease status post liver transplant, HCV status post viral treatment, type 2 diabetes on insulin hypertension, gout, hypothyroidism and PAD presents the emergency department with complaints shortness of breath and peripheral edema.    He reports that over.  Week he has developed worsening progressive lower extremity edema and has noted intermittent dyspnea on exertion and having some bouts acute dyspnea that occurred her randomly at rest.  He denies chest pain at this time , no chest pressure.   No reported his of CAD or heart failure in the past.  He states prior to his liver transplant that hes experienced peripheral edema.   He reports adherence to home medications/antihypertensive regimen.  Blood pressure elevated on vitals today.  He also feels increased abdominal girth.     He is non-smoker, no recent alcohol use, no drug use.  He reports no increased fluid intake, but diet discussion states his son has been working at [employer] and bringing home fried catfish dinner plate frequently over recent weeks. Patient later eating a taco his wife brought him after my initial visit, has had increased sodium intake reported.     ED Treatment: Lasix 40mg IV x 1 - Lidocaine patch, oxycodone 5mg.           Overview/Hospital Course:  No issues since admission. Diuersing well      Interval History: no acute events overnight. Slight improvement in dyspnea at rest noted. Limited improvement in LE edema. Reports similar feelings of back fullness and abdominal  distension    Review of Systems   Constitutional:  Positive for appetite change (decrease). Negative for chills and fever.   HENT:  Negative for congestion and sore throat.    Eyes:  Negative for photophobia and visual disturbance.   Respiratory:  Negative for cough, chest tightness and shortness of breath.    Cardiovascular:  Positive for leg swelling. Negative for chest pain and palpitations.   Gastrointestinal:  Positive for abdominal distention. Negative for abdominal pain, blood in stool, constipation, diarrhea, nausea and vomiting.   Endocrine: Negative for cold intolerance and heat intolerance.   Genitourinary:  Negative for dysuria and hematuria.   Musculoskeletal:  Negative for arthralgias and myalgias.   Skin:  Negative for rash and wound.   Allergic/Immunologic: Negative for environmental allergies and food allergies.   Neurological:  Negative for seizures and numbness.   Hematological:  Negative for adenopathy. Does not bruise/bleed easily.   Psychiatric/Behavioral:  Negative for hallucinations and suicidal ideas.    Objective:     Vital Signs (Most Recent):  Temp: 97.3 °F (36.3 °C) (05/14/23 0746)  Pulse: 85 (05/14/23 1113)  Resp: 19 (05/14/23 0936)  BP: (!) 115/56 (05/14/23 0746)  SpO2: (!) 94 % (05/14/23 0746) Vital Signs (24h Range):  Temp:  [97.3 °F (36.3 °C)-97.9 °F (36.6 °C)] 97.3 °F (36.3 °C)  Pulse:  [73-85] 85  Resp:  [18-20] 19  SpO2:  [90 %-98 %] 94 %  BP: (105-143)/(56-79) 115/56     Weight: 119.3 kg (263 lb 0.1 oz)  Body mass index is 34.7 kg/m².    Intake/Output Summary (Last 24 hours) at 5/14/2023 1205  Last data filed at 5/14/2023 0515  Gross per 24 hour   Intake 530 ml   Output 1500 ml   Net -970 ml           Physical Exam  Constitutional:       Appearance: He is well-developed.   HENT:      Head: Normocephalic and atraumatic.   Eyes:      Conjunctiva/sclera: Conjunctivae normal.      Pupils: Pupils are equal, round, and reactive to light.   Neck:      Thyroid: No thyromegaly.       Vascular: No JVD.   Cardiovascular:      Rate and Rhythm: Normal rate and regular rhythm.      Heart sounds: Normal heart sounds. No murmur heard.    No friction rub. No gallop.   Pulmonary:      Effort: Pulmonary effort is normal. No respiratory distress.      Breath sounds: Examination of the right-lower field reveals rales. Rales present. No wheezing.   Abdominal:      General: Bowel sounds are normal. There is no distension.      Palpations: Abdomen is soft. There is no mass.      Tenderness: There is no abdominal tenderness. There is no guarding or rebound.      Hernia: No hernia is present.      Comments: Hernia     Musculoskeletal:         General: No deformity. Normal range of motion.      Cervical back: Normal range of motion and neck supple.      Right lower leg: Edema present.      Left lower leg: Edema present.   Skin:     General: Skin is warm and dry.   Neurological:      Mental Status: He is alert and oriented to person, place, and time.      Cranial Nerves: No cranial nerve deficit.   Psychiatric:         Behavior: Behavior normal.           Significant Labs: All pertinent labs within the past 24 hours have been reviewed.  CBC:   Recent Labs   Lab 05/12/23  1802   WBC 7.99   HGB 11.7*   HCT 38.2*   *       CMP:   Recent Labs   Lab 05/12/23  1802 05/13/23  0530 05/14/23  0450    142 141   K 4.2 4.1 4.1    102 102   CO2 26 29 29   GLU 60* 105 125*   BUN 15 14 18   CREATININE 1.2 1.1 1.2   CALCIUM 9.6 9.7 9.6   PROT 7.1  --  7.1   ALBUMIN 3.8  --  3.7   BILITOT 0.5  --  0.5   ALKPHOS 62  --  75   AST 30  --  19   ALT 26  --  23   ANIONGAP 10 11 10         Significant Imaging: I have reviewed all pertinent imaging results/findings within the past 24 hours.      Assessment/Plan:      * Acute congestive heart failure  Patient is identified as having Unknown presumed DIASTOLIC CHF - ECHO IS PENDING heart failure that is Acute. CHF is currently uncontrolled due to Continued edema of  extremities and Pulmonary edema/pleural effusion on CXR.  Continue Beta Blocker, ACE/ARB, Furosemide and Nitrate/Vasodilator and monitor clinical status closely. Monitor on telemetry. Patient is on CHF pathway.  Monitor strict Is&Os and daily weights.  Place on fluid restriction of 1.5 L. Continue to stress to patient importance of self efficacy and  on diet for CHF. Last BNP reviewed- and noted below   Recent Labs   Lab 05/12/23  1802   BNP 46   - Keep on Tele monitoring  - Continue Lasix 60mg IV BID   -- monitor for any leona while on aggressive diuretics  - Increase hydralazine to 50mg TID and start ISDN 20mg TID  - Continue metoprolol  - Continue Lisinopril 40mg po daily       Hypomagnesemia  Replete magnesium prn      Diabetic polyneuropathy associated with type 2 diabetes mellitus  Bilateral neuropathy at feet - continue gabapentin  Also has chronic LUE pain, left hand numbness - has had MRI cervical spine this year with some neuro-foraminal stenosis      PAD (peripheral artery disease)  Continue aspirin/statin      CKD (chronic kidney disease), stage III  Monitor daily renal function while on diuretics      S/P liver transplant  Continue tacrolimus 2mg in AM, 1mg in evening  - check tacrolimus level in AM  - Hepatology following    Type 2 diabetes mellitus with diabetic polyneuropathy, with long-term current use of insulin  Patient's FSGs are controlled on current medication regimen.  Last A1c reviewed-   Lab Results   Component Value Date    HGBA1C 6.8 (H) 05/13/2023     Most recent fingerstick glucose reviewed-   Recent Labs   Lab 05/13/23  1543 05/13/23  2123 05/14/23  0816   POCTGLUCOSE 118* 178* 181*     Current correctional scale  Medium  Maintain anti-hyperglycemic dose as follows-   Antihyperglycemics (From admission, onward)    Start     Stop Route Frequency Ordered    05/13/23 0900  insulin detemir U-100 (Levemir) pen 20 Units         -- SubQ Daily 05/13/23 0712    05/13/23 0715  insulin aspart  U-100 pen 9 Units         -- SubQ 3 times daily with meals 05/13/23 0712    05/13/23 0057  insulin aspart U-100 pen 1-10 Units         -- SubQ Before meals & nightly PRN 05/12/23 2358        Hold Oral hypoglycemics while patient is in the hospital.      Essential hypertension  As per primary problem      Gout, unspecified  Chronic/stable  Continue allopurinol bid      Acquired hypothyroidism  Normal TSH/T4  - Continue home levothyroxine        VTE Risk Mitigation (From admission, onward)         Ordered     enoxaparin injection 40 mg  Daily         05/12/23 2357     IP VTE HIGH RISK PATIENT  Once         05/12/23 2357     Place sequential compression device  Until discontinued         05/12/23 2357                Discharge Planning   JOJO: 5/15/2023     Code Status: Full Code   Is the patient medically ready for discharge?: No    Reason for patient still in hospital (select all that apply): Patient trending condition                     Sailaja Ashley MD  Department of Hospital Medicine   [facility] - Cardiology Stepdown

## 2023-05-14 NOTE — ASSESSMENT & PLAN NOTE
Patient is identified as having Unknown presumed DIASTOLIC CHF - ECHO IS PENDING heart failure that is Acute. CHF is currently uncontrolled due to Continued edema of extremities and Pulmonary edema/pleural effusion on CXR.  Continue Beta Blocker, ACE/ARB, Furosemide and Nitrate/Vasodilator and monitor clinical status closely. Monitor on telemetry. Patient is on CHF pathway.  Monitor strict Is&Os and daily weights.  Place on fluid restriction of 1.5 L. Continue to stress to patient importance of self efficacy and  on diet for CHF. Last BNP reviewed- and noted below   Recent Labs   Lab 05/12/23  1802   BNP 46   - Keep on Tele monitoring  - Continue Lasix 60mg IV BID   -- monitor for any leona while on aggressive diuretics  - Increase hydralazine to 50mg TID and start ISDN 20mg TID  - Continue metoprolol  - Continue Lisinopril 40mg po daily

## 2023-05-14 NOTE — SUBJECTIVE & OBJECTIVE
Interval History: no acute events overnight. Slight improvement in dyspnea at rest noted. Limited improvement in LE edema. Reports similar feelings of back fullness and abdominal distension    Review of Systems   Constitutional:  Positive for appetite change (decrease). Negative for chills and fever.   HENT:  Negative for congestion and sore throat.    Eyes:  Negative for photophobia and visual disturbance.   Respiratory:  Negative for cough, chest tightness and shortness of breath.    Cardiovascular:  Positive for leg swelling. Negative for chest pain and palpitations.   Gastrointestinal:  Positive for abdominal distention. Negative for abdominal pain, blood in stool, constipation, diarrhea, nausea and vomiting.   Endocrine: Negative for cold intolerance and heat intolerance.   Genitourinary:  Negative for dysuria and hematuria.   Musculoskeletal:  Negative for arthralgias and myalgias.   Skin:  Negative for rash and wound.   Allergic/Immunologic: Negative for environmental allergies and food allergies.   Neurological:  Negative for seizures and numbness.   Hematological:  Negative for adenopathy. Does not bruise/bleed easily.   Psychiatric/Behavioral:  Negative for hallucinations and suicidal ideas.    Objective:     Vital Signs (Most Recent):  Temp: 97.3 °F (36.3 °C) (05/14/23 0746)  Pulse: 85 (05/14/23 1113)  Resp: 19 (05/14/23 0936)  BP: (!) 115/56 (05/14/23 0746)  SpO2: (!) 94 % (05/14/23 0746) Vital Signs (24h Range):  Temp:  [97.3 °F (36.3 °C)-97.9 °F (36.6 °C)] 97.3 °F (36.3 °C)  Pulse:  [73-85] 85  Resp:  [18-20] 19  SpO2:  [90 %-98 %] 94 %  BP: (105-143)/(56-79) 115/56     Weight: 119.3 kg (263 lb 0.1 oz)  Body mass index is 34.7 kg/m².    Intake/Output Summary (Last 24 hours) at 5/14/2023 1205  Last data filed at 5/14/2023 0515  Gross per 24 hour   Intake 530 ml   Output 1500 ml   Net -970 ml           Physical Exam  Constitutional:       Appearance: He is well-developed.   HENT:      Head: Normocephalic  and atraumatic.   Eyes:      Conjunctiva/sclera: Conjunctivae normal.      Pupils: Pupils are equal, round, and reactive to light.   Neck:      Thyroid: No thyromegaly.      Vascular: No JVD.   Cardiovascular:      Rate and Rhythm: Normal rate and regular rhythm.      Heart sounds: Normal heart sounds. No murmur heard.    No friction rub. No gallop.   Pulmonary:      Effort: Pulmonary effort is normal. No respiratory distress.      Breath sounds: Examination of the right-lower field reveals rales. Rales present. No wheezing.   Abdominal:      General: Bowel sounds are normal. There is no distension.      Palpations: Abdomen is soft. There is no mass.      Tenderness: There is no abdominal tenderness. There is no guarding or rebound.      Hernia: No hernia is present.      Comments: Hernia     Musculoskeletal:         General: No deformity. Normal range of motion.      Cervical back: Normal range of motion and neck supple.      Right lower leg: Edema present.      Left lower leg: Edema present.   Skin:     General: Skin is warm and dry.   Neurological:      Mental Status: He is alert and oriented to person, place, and time.      Cranial Nerves: No cranial nerve deficit.   Psychiatric:         Behavior: Behavior normal.           Significant Labs: All pertinent labs within the past 24 hours have been reviewed.  CBC:   Recent Labs   Lab 05/12/23  1802   WBC 7.99   HGB 11.7*   HCT 38.2*   *       CMP:   Recent Labs   Lab 05/12/23  1802 05/13/23  0530 05/14/23  0450    142 141   K 4.2 4.1 4.1    102 102   CO2 26 29 29   GLU 60* 105 125*   BUN 15 14 18   CREATININE 1.2 1.1 1.2   CALCIUM 9.6 9.7 9.6   PROT 7.1  --  7.1   ALBUMIN 3.8  --  3.7   BILITOT 0.5  --  0.5   ALKPHOS 62  --  75   AST 30  --  19   ALT 26  --  23   ANIONGAP 10 11 10         Significant Imaging: I have reviewed all pertinent imaging results/findings within the past 24 hours.

## 2023-05-14 NOTE — ASSESSMENT & PLAN NOTE
Patient's FSGs are controlled on current medication regimen.  Last A1c reviewed-   Lab Results   Component Value Date    HGBA1C 6.8 (H) 05/13/2023     Most recent fingerstick glucose reviewed-   Recent Labs   Lab 05/13/23  1543 05/13/23  2123 05/14/23  0816   POCTGLUCOSE 118* 178* 181*     Current correctional scale  Medium  Maintain anti-hyperglycemic dose as follows-   Antihyperglycemics (From admission, onward)    Start     Stop Route Frequency Ordered    05/13/23 0900  insulin detemir U-100 (Levemir) pen 20 Units         -- SubQ Daily 05/13/23 0712    05/13/23 0715  insulin aspart U-100 pen 9 Units         -- SubQ 3 times daily with meals 05/13/23 0712    05/13/23 0057  insulin aspart U-100 pen 1-10 Units         -- SubQ Before meals & nightly PRN 05/12/23 5798        Hold Oral hypoglycemics while patient is in the hospital.

## 2023-05-15 LAB
ALBUMIN SERPL BCP-MCNC: 3.3 G/DL (ref 3.5–5.2)
ALP SERPL-CCNC: 61 U/L (ref 55–135)
ALT SERPL W/O P-5'-P-CCNC: 19 U/L (ref 10–44)
ANION GAP SERPL CALC-SCNC: 11 MMOL/L (ref 8–16)
AST SERPL-CCNC: 16 U/L (ref 10–40)
AV INDEX (PROSTH): 0.74
AV MEAN GRADIENT: 3 MMHG
AV PEAK GRADIENT: 6 MMHG
AV VELOCITY RATIO: 0.72
BILIRUB SERPL-MCNC: 0.3 MG/DL (ref 0.1–1)
BSA FOR ECHO PROCEDURE: 2.48 M2
BUN SERPL-MCNC: 26 MG/DL (ref 8–23)
CALCIUM SERPL-MCNC: 9.1 MG/DL (ref 8.7–10.5)
CHLORIDE SERPL-SCNC: 99 MMOL/L (ref 95–110)
CO2 SERPL-SCNC: 30 MMOL/L (ref 23–29)
CREAT SERPL-MCNC: 1.5 MG/DL (ref 0.5–1.4)
CV ECHO LV RWT: 0.49 CM
DOP CALC AO PEAK VEL: 1.23 M/S
DOP CALC AO VTI: 25.62 CM
DOP CALC LVOT PEAK VEL: 0.88 M/S
DOP CALCLVOT PEAK VEL VTI: 18.94 CM
E WAVE DECELERATION TIME: 114 MSEC
E/A RATIO: 1.03
E/E' RATIO: 7.1 M/S
ECHO LV POSTERIOR WALL: 1 CM (ref 0.6–1.1)
EJECTION FRACTION: 60 %
EST. GFR  (NO RACE VARIABLE): 51.3 ML/MIN/1.73 M^2
FRACTIONAL SHORTENING: 31 % (ref 28–44)
GLUCOSE SERPL-MCNC: 141 MG/DL (ref 70–110)
INTERVENTRICULAR SEPTUM: 1.14 CM (ref 0.6–1.1)
IVRT: 79.92 MSEC
LA MAJOR: 4.89 CM
LA MINOR: 5.05 CM
LA WIDTH: 4.13 CM
LEFT ATRIUM SIZE: 3.5 CM
LEFT ATRIUM VOLUME INDEX MOD: 25.6 ML/M2
LEFT ATRIUM VOLUME INDEX: 25.2 ML/M2
LEFT ATRIUM VOLUME MOD: 61.95 CM3
LEFT ATRIUM VOLUME: 61.05 CM3
LEFT INTERNAL DIMENSION IN SYSTOLE: 2.78 CM (ref 2.1–4)
LEFT VENTRICLE DIASTOLIC VOLUME INDEX: 29.73 ML/M2
LEFT VENTRICLE DIASTOLIC VOLUME: 71.95 ML
LEFT VENTRICLE MASS INDEX: 59 G/M2
LEFT VENTRICLE SYSTOLIC VOLUME INDEX: 12 ML/M2
LEFT VENTRICLE SYSTOLIC VOLUME: 29.06 ML
LEFT VENTRICULAR INTERNAL DIMENSION IN DIASTOLE: 4.05 CM (ref 3.5–6)
LEFT VENTRICULAR MASS: 142.66 G
LV LATERAL E/E' RATIO: 7.1 M/S
LV SEPTAL E/E' RATIO: 7.1 M/S
MAGNESIUM SERPL-MCNC: 1.8 MG/DL (ref 1.6–2.6)
MV A" WAVE DURATION": 15.13 MSEC
MV PEAK A VEL: 0.69 M/S
MV PEAK E VEL: 0.71 M/S
MV STENOSIS PRESSURE HALF TIME: 33.06 MS
MV VALVE AREA P 1/2 METHOD: 6.65 CM2
PHOSPHATE SERPL-MCNC: 3.7 MG/DL (ref 2.7–4.5)
POCT GLUCOSE: 116 MG/DL (ref 70–110)
POCT GLUCOSE: 120 MG/DL (ref 70–110)
POCT GLUCOSE: 198 MG/DL (ref 70–110)
POTASSIUM SERPL-SCNC: 4.1 MMOL/L (ref 3.5–5.1)
PROT SERPL-MCNC: 6.3 G/DL (ref 6–8.4)
PULM VEIN S/D RATIO: 1.48
PV PEAK D VEL: 0.23 M/S
PV PEAK S VEL: 0.34 M/S
RA MAJOR: 4.06 CM
RA PRESSURE: 8 MMHG
RA WIDTH: 3.76 CM
RIGHT VENTRICULAR END-DIASTOLIC DIMENSION: 3.51 CM
RV TISSUE DOPPLER FREE WALL SYSTOLIC VELOCITY 1 (APICAL 4 CHAMBER VIEW): 13 CM/S
SODIUM SERPL-SCNC: 140 MMOL/L (ref 136–145)
TACROLIMUS BLD-MCNC: 6.3 NG/ML (ref 5–15)
TDI LATERAL: 0.1 M/S
TDI SEPTAL: 0.1 M/S
TDI: 0.1 M/S
TRICUSPID ANNULAR PLANE SYSTOLIC EXCURSION: 2.77 CM

## 2023-05-15 PROCEDURE — 80197 ASSAY OF TACROLIMUS: CPT | Performed by: STUDENT IN AN ORGANIZED HEALTH CARE EDUCATION/TRAINING PROGRAM

## 2023-05-15 PROCEDURE — 80053 COMPREHEN METABOLIC PANEL: CPT | Performed by: STUDENT IN AN ORGANIZED HEALTH CARE EDUCATION/TRAINING PROGRAM

## 2023-05-15 PROCEDURE — 83735 ASSAY OF MAGNESIUM: CPT | Performed by: HOSPITALIST

## 2023-05-15 PROCEDURE — 63600175 PHARM REV CODE 636 W HCPCS: Performed by: HOSPITALIST

## 2023-05-15 PROCEDURE — 25000003 PHARM REV CODE 250: Performed by: STUDENT IN AN ORGANIZED HEALTH CARE EDUCATION/TRAINING PROGRAM

## 2023-05-15 PROCEDURE — 25000003 PHARM REV CODE 250: Performed by: HOSPITALIST

## 2023-05-15 PROCEDURE — 63600175 PHARM REV CODE 636 W HCPCS: Performed by: STUDENT IN AN ORGANIZED HEALTH CARE EDUCATION/TRAINING PROGRAM

## 2023-05-15 PROCEDURE — 99233 PR SUBSEQUENT HOSPITAL CARE,LEVL III: ICD-10-PCS | Mod: ,,, | Performed by: STUDENT IN AN ORGANIZED HEALTH CARE EDUCATION/TRAINING PROGRAM

## 2023-05-15 PROCEDURE — 99233 SBSQ HOSP IP/OBS HIGH 50: CPT | Mod: ,,, | Performed by: STUDENT IN AN ORGANIZED HEALTH CARE EDUCATION/TRAINING PROGRAM

## 2023-05-15 PROCEDURE — 20600001 HC STEP DOWN PRIVATE ROOM

## 2023-05-15 PROCEDURE — 94761 N-INVAS EAR/PLS OXIMETRY MLT: CPT

## 2023-05-15 PROCEDURE — 84100 ASSAY OF PHOSPHORUS: CPT | Performed by: HOSPITALIST

## 2023-05-15 PROCEDURE — 36415 COLL VENOUS BLD VENIPUNCTURE: CPT | Performed by: HOSPITALIST

## 2023-05-15 RX ORDER — TACROLIMUS 1 MG/1
1 CAPSULE ORAL EVERY EVENING
Status: DISCONTINUED | OUTPATIENT
Start: 2023-05-15 | End: 2023-05-16

## 2023-05-15 RX ORDER — TACROLIMUS 1 MG/1
1 CAPSULE ORAL EVERY MORNING
Status: DISCONTINUED | OUTPATIENT
Start: 2023-05-15 | End: 2023-05-16

## 2023-05-15 RX ADMIN — THERA TABS 1 TABLET: TAB at 10:05

## 2023-05-15 RX ADMIN — TACROLIMUS 1 MG: 1 CAPSULE ORAL at 10:05

## 2023-05-15 RX ADMIN — OXYCODONE HYDROCHLORIDE AND ACETAMINOPHEN 1 TABLET: 5; 325 TABLET ORAL at 06:05

## 2023-05-15 RX ADMIN — LEVOTHYROXINE SODIUM 88 MCG: 88 TABLET ORAL at 06:05

## 2023-05-15 RX ADMIN — OXYCODONE HYDROCHLORIDE AND ACETAMINOPHEN 1 TABLET: 5; 325 TABLET ORAL at 09:05

## 2023-05-15 RX ADMIN — INSULIN ASPART 9 UNITS: 100 INJECTION, SOLUTION INTRAVENOUS; SUBCUTANEOUS at 08:05

## 2023-05-15 RX ADMIN — TAMSULOSIN HYDROCHLORIDE 0.4 MG: 0.4 CAPSULE ORAL at 10:05

## 2023-05-15 RX ADMIN — ISOSORBIDE DINITRATE 20 MG: 20 TABLET ORAL at 04:05

## 2023-05-15 RX ADMIN — NIFEDIPINE 90 MG: 60 TABLET, FILM COATED, EXTENDED RELEASE ORAL at 10:05

## 2023-05-15 RX ADMIN — INSULIN ASPART 9 UNITS: 100 INJECTION, SOLUTION INTRAVENOUS; SUBCUTANEOUS at 04:05

## 2023-05-15 RX ADMIN — ISOSORBIDE DINITRATE 20 MG: 20 TABLET ORAL at 09:05

## 2023-05-15 RX ADMIN — LISINOPRIL 40 MG: 20 TABLET ORAL at 10:05

## 2023-05-15 RX ADMIN — ATORVASTATIN CALCIUM 20 MG: 20 TABLET, FILM COATED ORAL at 10:05

## 2023-05-15 RX ADMIN — ALLOPURINOL 100 MG: 300 TABLET ORAL at 10:05

## 2023-05-15 RX ADMIN — ASPIRIN 81 MG CHEWABLE TABLET 81 MG: 81 TABLET CHEWABLE at 10:05

## 2023-05-15 RX ADMIN — INSULIN DETEMIR 20 UNITS: 100 INJECTION, SOLUTION SUBCUTANEOUS at 09:05

## 2023-05-15 RX ADMIN — OXYCODONE AND ACETAMINOPHEN 1 TABLET: 10; 325 TABLET ORAL at 04:05

## 2023-05-15 RX ADMIN — GABAPENTIN 800 MG: 400 CAPSULE ORAL at 09:05

## 2023-05-15 RX ADMIN — GABAPENTIN 800 MG: 400 CAPSULE ORAL at 04:05

## 2023-05-15 RX ADMIN — Medication 1 TABLET: at 09:05

## 2023-05-15 RX ADMIN — INSULIN ASPART 2 UNITS: 100 INJECTION, SOLUTION INTRAVENOUS; SUBCUTANEOUS at 08:05

## 2023-05-15 RX ADMIN — OXYCODONE AND ACETAMINOPHEN 1 TABLET: 10; 325 TABLET ORAL at 10:05

## 2023-05-15 RX ADMIN — HYDRALAZINE HYDROCHLORIDE 50 MG: 50 TABLET ORAL at 04:05

## 2023-05-15 RX ADMIN — TRAZODONE HYDROCHLORIDE 50 MG: 50 TABLET ORAL at 09:05

## 2023-05-15 RX ADMIN — ISOSORBIDE DINITRATE 20 MG: 20 TABLET ORAL at 10:05

## 2023-05-15 RX ADMIN — INSULIN ASPART 9 UNITS: 100 INJECTION, SOLUTION INTRAVENOUS; SUBCUTANEOUS at 12:05

## 2023-05-15 RX ADMIN — Medication 1 TABLET: at 10:05

## 2023-05-15 RX ADMIN — ALLOPURINOL 100 MG: 300 TABLET ORAL at 09:05

## 2023-05-15 RX ADMIN — TACROLIMUS 1 MG: 1 CAPSULE ORAL at 07:05

## 2023-05-15 RX ADMIN — HYDRALAZINE HYDROCHLORIDE 50 MG: 50 TABLET ORAL at 10:05

## 2023-05-15 RX ADMIN — METOPROLOL SUCCINATE 200 MG: 100 TABLET, EXTENDED RELEASE ORAL at 10:05

## 2023-05-15 RX ADMIN — SERTRALINE 100 MG: 100 TABLET, FILM COATED ORAL at 10:05

## 2023-05-15 RX ADMIN — GABAPENTIN 800 MG: 400 CAPSULE ORAL at 10:05

## 2023-05-15 RX ADMIN — ENOXAPARIN SODIUM 40 MG: 40 INJECTION SUBCUTANEOUS at 04:05

## 2023-05-15 RX ADMIN — HYDRALAZINE HYDROCHLORIDE 50 MG: 50 TABLET ORAL at 09:05

## 2023-05-15 NOTE — ASSESSMENT & PLAN NOTE
Patient is identified as having Unknown presumed DIASTOLIC CHF - ECHO IS PENDING heart failure that is Acute. CHF is currently uncontrolled due to Continued edema of extremities and Pulmonary edema/pleural effusion on CXR.  Continue Beta Blocker, ACE/ARB, Furosemide and Nitrate/Vasodilator and monitor clinical status closely. Monitor on telemetry. Patient is on CHF pathway.  Monitor strict Is&Os and daily weights.  Place on fluid restriction of 1.5 L. Continue to stress to patient importance of self efficacy and  on diet for CHF. Last BNP reviewed- and noted below   Recent Labs   Lab 05/12/23  1802   BNP 46   - Keep on Tele monitoring  - TTE performed on 5/15, EF 60% with CVP of around 8, patient appears to have hfpef  - Hold Lasix 60mg IV BID given OSMANY  -- monitor for resolution with holding diuretics  - Increase hydralazine to 50mg TID and continue ISDN 20mg TID  - Continue metoprolol  - Continue Lisinopril 40mg po daily

## 2023-05-15 NOTE — TREATMENT PLAN
[facility]  Hepatology  Treatment Plan    Patient Name: Vick Gonzalez  MRN: [id number]  Admission Date: 5/12/2023  Hospital Length of Stay: 2 days    Subjective:     Interval History:  NAEON. Prograf level 6.3. OSMANY 1.2-->1.5.    No current facility-administered medications on file prior to encounter.     Current Outpatient Medications on File Prior to Encounter   Medication Sig Dispense Refill    albuterol (VENTOLIN HFA) 90 mcg/actuation inhaler Inhale 2 puffs into the lungs every 6 (six) hours as needed for Wheezing or Shortness of Breath. Rescue 8.5 g 1    allopurinoL (ZYLOPRIM) 100 MG tablet TAKE 1 TABLET BY MOUTH TWICE A  tablet 3    aspirin 81 MG Chew Take 1 tablet (81 mg total) by mouth once daily. 90 tablet 3    calcium carbonate-vitamin D3 (CALCIUM 600 WITH VITAMIN D3) 600 mg(1,500mg) -400 unit Chew Take 1 tablet by mouth once daily.       clotrimazole (LOTRIMIN) 1 % cream APPLY TOPICALLY TWICE A DAY 30 g 1    gabapentin (NEURONTIN) 800 MG tablet Take 1 tablet (800 mg total) by mouth 3 (three) times daily. 90 tablet 11    hydrALAZINE (APRESOLINE) 25 MG tablet Take 25 mg by mouth every 12 (twelve) hours.      insulin glargine U-300 conc (TOUJEO MAX U-300 SOLOSTAR) 300 unit/mL (3 mL) insulin pen Inject 40 Units into the skin once daily. 3 mL 11    insulin lispro (HUMALOG KWIKPEN INSULIN) 100 unit/mL pen Inject 20 Units into the skin 3 (three) times daily with meals. 15 mL 11    levothyroxine (SYNTHROID) 88 MCG tablet TAKE 1 TABLET BY MOUTH EVERY DAY 90 tablet 1    lisinopriL (PRINIVIL,ZESTRIL) 40 MG tablet TAKE 1 TABLET BY MOUTH EVERY DAY 90 tablet 3    metoprolol succinate (TOPROL-XL) 200 MG 24 hr tablet TAKE 1 TABLET BY MOUTH EVERY DAY 90 tablet 3    multivitamin (THERAGRAN) per tablet Take 1 tablet by mouth once daily.       NIFEdipine (PROCARDIA-XL) 90 MG (OSM) 24 hr tablet Take 1 tablet (90 mg total) by mouth once daily. 90 tablet 3    ondansetron (ZOFRAN-ODT) 4 MG TbDL Take 1  "tablet (4 mg total) by mouth every 6 (six) hours as needed (Nausea). 15 tablet 0    rosuvastatin (CRESTOR) 5 MG tablet TAKE 1 TABLET BY MOUTH EVERY DAY 90 tablet 11    sertraline (ZOLOFT) 100 MG tablet TAKE 1 TABLET (100 MG TOTAL) BY MOUTH ONCE DAILY. 30 tablet 7    tacrolimus (PROGRAF) 1 MG Cap Take 2 capsules (2 mg total) by mouth every morning AND 1 capsule (1 mg total) every evening. 90 capsule 11    traZODone (DESYREL) 50 MG tablet TAKE 1 TABLET BY MOUTH EVERY EVENING. 90 tablet 3    aluminum-magnesium hydroxide-simethicone (MAALOX) 200-200-20 mg/5 mL Susp Take 30 mLs by mouth 4 (four) times daily before meals and nightly. 769 mL 0    blood sugar diagnostic (TRUE METRIX GLUCOSE TEST STRIP) Strp USE 3 TIMES DAILY TO TEST BLOOD GLUCOSE LEVEL 100 strip 11    blood-glucose meter Misc 1 Device by Misc.(Non-Drug; Combo Route) route 3 (three) times daily. 1 each 0    blood-glucose sensor (DEXCOM G6 SENSOR) Viviane Use as directed 3 each 11    blood-glucose transmitter (DEXCOM G6 TRANSMITTER) Viviane Use as directed 1 each 11    flash glucose sensor (FREESTYLE PALLAVI 2 SENSOR) Kit One sensor every 14 days 2 kit prn    glucose 4 GM chewable tablet Take 4 tablets (16 g total) by mouth as needed for Low blood sugar (If having symptoms of blurry vision, palpitations, confusion, shakiness.  Please check sugars and if sugar below 70 please take 4 tablets and re-check sugar everry 15 minutes until sugars are above 70 and symptoms resolve.). 50 tablet 12    lancets 30 gauge Misc 1 lancet by Misc.(Non-Drug; Combo Route) route 4 (four) times daily before meals and nightly. 200 each 11    NOVOFINE PLUS 32 gauge x 1/6" Ndle       [DISCONTINUED] insulin aspart U-100 (NOVOLOG FLEXPEN U-100 INSULIN) 100 unit/mL (3 mL) InPn pen Inject 20 Units into the skin 3 (three) times daily with meals. 15 mL 11       Review of patient's allergies indicates:  No Known Allergies      Objective:     Vitals:    05/15/23 1111   BP:    Pulse: 78   Resp:  "   Temp:        Significant Labs:  Recent Labs   Lab 05/12/23 1802   HGB 11.7*       Lab Results   Component Value Date    WBC 7.99 05/12/2023    HGB 11.7 (L) 05/12/2023    HCT 38.2 (L) 05/12/2023    MCV 92 05/12/2023     (L) 05/12/2023       Lab Results   Component Value Date     05/15/2023    K 4.1 05/15/2023    CL 99 05/15/2023    CO2 30 (H) 05/15/2023    BUN 26 (H) 05/15/2023    CREATININE 1.5 (H) 05/15/2023    CALCIUM 9.1 05/15/2023    ANIONGAP 11 05/15/2023    ESTGFRAFRICA >60.0 07/25/2022    EGFRNONAA 52.7 (A) 07/25/2022       Lab Results   Component Value Date    ALT 19 05/15/2023    AST 16 05/15/2023     (H) 04/24/2017    ALKPHOS 61 05/15/2023    BILITOT 0.3 05/15/2023       Lab Results   Component Value Date    INR 1.0 04/24/2023    INR 1.0 01/23/2023    INR 1.0 07/12/2022       Significant Imaging:  Reviewed pertinent radiology findings.       Assessment/Plan:   66yo PMHx EtOH / HCV cirrhosis s/p OLTx (2010) c/b recurrent fibrosis, IDDM, HTN, gout, hypothyroid, PAD presented to [facility] ED On 05/12 with complaints of SOB.     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 Please call with any additional questions, concerns or changes in the patient's clinical status. We will continue to follow.   Celestino Paredes MD  Gastroenterology Fellow PGY IV   [facility]

## 2023-05-15 NOTE — SUBJECTIVE & OBJECTIVE
Interval History: no acute events overnight. Holding lasix given OSMANY. TTE performed, Ef 60% with cvp of around 8    Review of Systems   Constitutional:  Positive for appetite change (decrease). Negative for chills and fever.   HENT:  Negative for congestion and sore throat.    Eyes:  Negative for photophobia and visual disturbance.   Respiratory:  Negative for cough, chest tightness and shortness of breath.    Cardiovascular:  Positive for leg swelling. Negative for chest pain and palpitations.   Gastrointestinal:  Positive for abdominal distention. Negative for abdominal pain, blood in stool, constipation, diarrhea, nausea and vomiting.   Endocrine: Negative for cold intolerance and heat intolerance.   Genitourinary:  Negative for dysuria and hematuria.   Musculoskeletal:  Negative for arthralgias and myalgias.   Skin:  Negative for rash and wound.   Allergic/Immunologic: Negative for environmental allergies and food allergies.   Neurological:  Negative for seizures and numbness.   Hematological:  Negative for adenopathy. Does not bruise/bleed easily.   Psychiatric/Behavioral:  Negative for hallucinations and suicidal ideas.    Objective:     Vital Signs (Most Recent):  Temp: 97.9 °F (36.6 °C) (05/15/23 1059)  Pulse: 69 (05/15/23 1449)  Resp: 19 (05/15/23 1059)  BP: (!) 117/57 (05/15/23 1059)  SpO2: (!) 94 % (05/15/23 1059) Vital Signs (24h Range):  Temp:  [96.8 °F (36 °C)-97.9 °F (36.6 °C)] 97.9 °F (36.6 °C)  Pulse:  [69-81] 69  Resp:  [18-19] 19  SpO2:  [91 %-98 %] 94 %  BP: (104-123)/(55-72) 117/57     Weight: 119.7 kg (264 lb)  Body mass index is 34.83 kg/m².    Intake/Output Summary (Last 24 hours) at 5/15/2023 1621  Last data filed at 5/15/2023 1319  Gross per 24 hour   Intake 1230 ml   Output 1250 ml   Net -20 ml           Physical Exam  Constitutional:       Appearance: He is well-developed.   HENT:      Head: Normocephalic and atraumatic.   Eyes:      Conjunctiva/sclera: Conjunctivae normal.      Pupils:  Pupils are equal, round, and reactive to light.   Neck:      Thyroid: No thyromegaly.      Vascular: No JVD.   Cardiovascular:      Rate and Rhythm: Normal rate and regular rhythm.      Heart sounds: Normal heart sounds. No murmur heard.    No friction rub. No gallop.   Pulmonary:      Effort: Pulmonary effort is normal. No respiratory distress.      Breath sounds: Examination of the right-lower field reveals rales. Rales present. No wheezing.   Abdominal:      General: Bowel sounds are normal. There is no distension.      Palpations: Abdomen is soft. There is no mass.      Tenderness: There is no abdominal tenderness. There is no guarding or rebound.      Hernia: No hernia is present.      Comments: Hernia     Musculoskeletal:         General: No deformity. Normal range of motion.      Cervical back: Normal range of motion and neck supple.      Right lower leg: Edema present.      Left lower leg: Edema present.   Skin:     General: Skin is warm and dry.   Neurological:      Mental Status: He is alert and oriented to person, place, and time.      Cranial Nerves: No cranial nerve deficit.   Psychiatric:         Behavior: Behavior normal.           Significant Labs: All pertinent labs within the past 24 hours have been reviewed.  CBC:   No results for input(s): WBC, HGB, HCT, PLT in the last 48 hours.    CMP:   Recent Labs   Lab 05/14/23  0450 05/15/23  0503    140   K 4.1 4.1    99   CO2 29 30*   * 141*   BUN 18 26*   CREATININE 1.2 1.5*   CALCIUM 9.6 9.1   PROT 7.1 6.3   ALBUMIN 3.7 3.3*   BILITOT 0.5 0.3   ALKPHOS 75 61   AST 19 16   ALT 23 19   ANIONGAP 10 11         Significant Imaging: I have reviewed all pertinent imaging results/findings within the past 24 hours.

## 2023-05-15 NOTE — ASSESSMENT & PLAN NOTE
Patient with acute kidney injury likely due to pre-renal azotemia OSMANY is currently worsening. Labs reviewed- Renal function/electrolytes with Estimated Creatinine Clearance: 66.6 mL/min (A) (based on SCr of 1.5 mg/dL (H)). according to latest data. Monitor urine output and serial BMP and adjust therapy as needed. Avoid nephrotoxins and renally dose meds for GFR listed above.   - secondary to over diuresis  - hold lasix today  - repeat cmp tomorrow, possible restart with oral diuretics

## 2023-05-15 NOTE — PLAN OF CARE
[facility] Cardiology Stepdown  Initial Discharge Assessment       Primary Care Provider: Emanuel Lopez MD    Admission Diagnosis: Acute congestive heart failure [I50.9]  Acute pulmonary edema [J81.0]  Leg swelling [M79.89]  Liver transplant status [Z94.4]    Admission Date: 5/12/2023  Expected Discharge Date: 5/16/2023    Transition of Care Barriers: None    Payor: [employer]ICARE / Plan: [employer] / Product Type: Medicare Advantage /     Extended Emergency Contact Information  Primary Emergency Contact: Cely Gonzalez  Address: [address] [place] [address] [id number] [place] [address] of [facility]  Home Phone: [phone]  Work Phone: [phone]  Mobile Phone: [phone]  Relation: Spouse  Preferred language: English    Discharge Plan A: Home with family  Discharge Plan B: Home Health, Home with family      [id number] [place] - [address]  [address] [facility] [address] [place] [address]  Phone: [phone] Fax: [phone]    [facility] [place] - [address]  [address]  [address]  [facility] [address]  Phone: [phone] Fax: [phone]    [facility]  [address]  [place] [address]  Phone: [phone] Fax: [phone]    [facility]  [address]  [facility] [address]  Phone: [phone] Fax: [phone]      Initial Assessment (most recent)       Adult Discharge Assessment - 05/15/23 1635          Discharge Assessment    Assessment Type Discharge Planning Assessment     Confirmed Demographics Correct on Facesheet     Source of Information patient     Communicated JOJO with patient/caregiver Date not available/Unable to determine     Reason For Admission Cely Gonzalez (spouse) [phone]     People in Home alone     Facility Arrived From: home     Do you expect to return to your current living situation? Yes     Do you have help at home or someone to help you manage your  care at home? Yes     Who are your caregiver(s) and their phone number(s)? Cely Gonzalez (spouse) [phone]     Prior to hospitilization cognitive status: Alert/Oriented     Current cognitive status: Alert/Oriented     Walking or Climbing Stairs ambulation difficulty, requires equipment     Mobility Management cane, straight     Equipment Currently Used at Home cane, straight     Readmission within 30 days? No     Patient currently being followed by outpatient case management? No     Do you currently have service(s) that help you manage your care at home? No     Do you take prescription medications? Yes     Do you have prescription coverage? Yes     Coverage [employer]     Do you have any problems affording any of your prescribed medications? No     Is the patient taking medications as prescribed? yes     Who is going to help you get home at discharge? Cely Gonzalez (spouse) [phone]     How do you get to doctors appointments? car, drives self     Are you on dialysis? No     Do you take coumadin? No     Discharge Plan A Home with family     Discharge Plan B Home Health;Home with family     DME Needed Upon Discharge  cane, straight     Discharge Plan discussed with: Patient     Name(s) and Number(s) Cely Gonzalez (spouse) [phone]     Transition of Care Barriers None        Physical Activity    On average, how many days per week do you engage in moderate to strenuous exercise (like a brisk walk)? 7 days     On average, how many minutes do you engage in exercise at this level? 30 min        Financial Resource Strain    How hard is it for you to pay for the very basics like food, housing, medical care, and heating? Not very hard        Housing Stability    In the last 12 months, was there a time when you were not able to pay the mortgage or rent on time? No     In the last 12 months, how many places have you lived? 1     In the last 12 months, was there a time when you did not have a steady place  to sleep or slept in a shelter (including now)? No        Transportation Needs    In the past 12 months, has lack of transportation kept you from medical appointments or from getting medications? No     In the past 12 months, has lack of transportation kept you from meetings, work, or from getting things needed for daily living? No        Food Insecurity    Within the past 12 months, you worried that your food would run out before you got the money to buy more. Never true     Within the past 12 months, the food you bought just didn't last and you didn't have money to get more. Never true        Stress    Do you feel stress - tense, restless, nervous, or anxious, or unable to sleep at night because your mind is troubled all the time - these days? Not at all        Social Connections    In a typical week, how many times do you talk on the phone with family, friends, or neighbors? Three times a week     How often do you get together with friends or relatives? Three times a week     How often do you attend Yazdanism or Bahai services? More than 4 times per year     Do you belong to any clubs or organizations such as Yazdanism groups, unions, fraternal or athletic groups, or school groups? No     How often do you attend meetings of the clubs or organizations you belong to? Never     Are you , , , , never , or living with a partner?         Alcohol Use    Q1: How often do you have a drink containing alcohol? Never     Q2: How many drinks containing alcohol do you have on a typical day when you are drinking? Patient does not drink     Q3: How often do you have six or more drinks on one occasion? Never        OTHER    Name(s) of People in Home Cely Gonzalez (spouse) [phone]                       met with patient at the bedside and gave him the discharge booklet and contact numbers. Patient verified name and  , insurance and stated he lives in a single story house with  spouse. 2 steps to port of entry with handrails on both sides. He stated he does not own any dme but does have a cane. Patient denied coumadin use and is not dialysis. Will continue to monitor for discharge needs.     Tia Peck RN    [phone]

## 2023-05-15 NOTE — NURSING
Patient identified by 2 identifiers.   Allergies reviewed.  IV in place to Rt Hand .  Bubble study explained to patient, patient verbalized understanding.  Bubble performed, with & without Valsalva.  Pt tolerated procedure well.

## 2023-05-16 LAB
ALBUMIN SERPL BCP-MCNC: 3.5 G/DL (ref 3.5–5.2)
ALBUMIN SERPL BCP-MCNC: 3.8 G/DL (ref 3.5–5.2)
ALP SERPL-CCNC: 63 U/L (ref 55–135)
ALT SERPL W/O P-5'-P-CCNC: 19 U/L (ref 10–44)
ANION GAP SERPL CALC-SCNC: 10 MMOL/L (ref 8–16)
ANION GAP SERPL CALC-SCNC: 11 MMOL/L (ref 8–16)
AST SERPL-CCNC: 19 U/L (ref 10–40)
BILIRUB SERPL-MCNC: 0.3 MG/DL (ref 0.1–1)
BUN SERPL-MCNC: 32 MG/DL (ref 8–23)
BUN SERPL-MCNC: 34 MG/DL (ref 8–23)
CALCIUM SERPL-MCNC: 9 MG/DL (ref 8.7–10.5)
CALCIUM SERPL-MCNC: 9.2 MG/DL (ref 8.7–10.5)
CHLORIDE SERPL-SCNC: 101 MMOL/L (ref 95–110)
CHLORIDE SERPL-SCNC: 101 MMOL/L (ref 95–110)
CO2 SERPL-SCNC: 26 MMOL/L (ref 23–29)
CO2 SERPL-SCNC: 28 MMOL/L (ref 23–29)
CREAT SERPL-MCNC: 1.6 MG/DL (ref 0.5–1.4)
CREAT SERPL-MCNC: 1.6 MG/DL (ref 0.5–1.4)
CREAT UR-MCNC: 93 MG/DL (ref 23–375)
EST. GFR  (NO RACE VARIABLE): 47.5 ML/MIN/1.73 M^2
EST. GFR  (NO RACE VARIABLE): 47.5 ML/MIN/1.73 M^2
GLUCOSE SERPL-MCNC: 188 MG/DL (ref 70–110)
GLUCOSE SERPL-MCNC: 95 MG/DL (ref 70–110)
MAGNESIUM SERPL-MCNC: 1.8 MG/DL (ref 1.6–2.6)
PHOSPHATE SERPL-MCNC: 3.6 MG/DL (ref 2.7–4.5)
POCT GLUCOSE: 119 MG/DL (ref 70–110)
POCT GLUCOSE: 139 MG/DL (ref 70–110)
POCT GLUCOSE: 155 MG/DL (ref 70–110)
POTASSIUM SERPL-SCNC: 4.3 MMOL/L (ref 3.5–5.1)
POTASSIUM SERPL-SCNC: 4.5 MMOL/L (ref 3.5–5.1)
PROT SERPL-MCNC: 6.8 G/DL (ref 6–8.4)
SODIUM SERPL-SCNC: 138 MMOL/L (ref 136–145)
SODIUM SERPL-SCNC: 139 MMOL/L (ref 136–145)
SODIUM UR-SCNC: 78 MMOL/L (ref 20–250)
TACROLIMUS BLD-MCNC: 6.7 NG/ML (ref 5–15)

## 2023-05-16 PROCEDURE — 84300 ASSAY OF URINE SODIUM: CPT | Performed by: STUDENT IN AN ORGANIZED HEALTH CARE EDUCATION/TRAINING PROGRAM

## 2023-05-16 PROCEDURE — 25000003 PHARM REV CODE 250: Performed by: STUDENT IN AN ORGANIZED HEALTH CARE EDUCATION/TRAINING PROGRAM

## 2023-05-16 PROCEDURE — 63600175 PHARM REV CODE 636 W HCPCS: Performed by: STUDENT IN AN ORGANIZED HEALTH CARE EDUCATION/TRAINING PROGRAM

## 2023-05-16 PROCEDURE — 80053 COMPREHEN METABOLIC PANEL: CPT | Performed by: STUDENT IN AN ORGANIZED HEALTH CARE EDUCATION/TRAINING PROGRAM

## 2023-05-16 PROCEDURE — 80197 ASSAY OF TACROLIMUS: CPT | Performed by: STUDENT IN AN ORGANIZED HEALTH CARE EDUCATION/TRAINING PROGRAM

## 2023-05-16 PROCEDURE — 83735 ASSAY OF MAGNESIUM: CPT | Performed by: HOSPITALIST

## 2023-05-16 PROCEDURE — 99233 PR SUBSEQUENT HOSPITAL CARE,LEVL III: ICD-10-PCS | Mod: ,,, | Performed by: STUDENT IN AN ORGANIZED HEALTH CARE EDUCATION/TRAINING PROGRAM

## 2023-05-16 PROCEDURE — 20600001 HC STEP DOWN PRIVATE ROOM

## 2023-05-16 PROCEDURE — 36415 COLL VENOUS BLD VENIPUNCTURE: CPT | Performed by: STUDENT IN AN ORGANIZED HEALTH CARE EDUCATION/TRAINING PROGRAM

## 2023-05-16 PROCEDURE — 80069 RENAL FUNCTION PANEL: CPT | Performed by: STUDENT IN AN ORGANIZED HEALTH CARE EDUCATION/TRAINING PROGRAM

## 2023-05-16 PROCEDURE — 94761 N-INVAS EAR/PLS OXIMETRY MLT: CPT

## 2023-05-16 PROCEDURE — 82570 ASSAY OF URINE CREATININE: CPT | Performed by: STUDENT IN AN ORGANIZED HEALTH CARE EDUCATION/TRAINING PROGRAM

## 2023-05-16 PROCEDURE — 63600175 PHARM REV CODE 636 W HCPCS: Performed by: HOSPITALIST

## 2023-05-16 PROCEDURE — 25000003 PHARM REV CODE 250: Performed by: HOSPITALIST

## 2023-05-16 PROCEDURE — 99233 SBSQ HOSP IP/OBS HIGH 50: CPT | Mod: ,,, | Performed by: STUDENT IN AN ORGANIZED HEALTH CARE EDUCATION/TRAINING PROGRAM

## 2023-05-16 RX ORDER — PNV NO.95/FERROUS FUM/FOLIC AC 28MG-0.8MG
100 TABLET ORAL DAILY
COMMUNITY

## 2023-05-16 RX ORDER — GABAPENTIN 300 MG/1
600 CAPSULE ORAL 3 TIMES DAILY
Status: DISCONTINUED | OUTPATIENT
Start: 2023-05-16 | End: 2023-05-21 | Stop reason: HOSPADM

## 2023-05-16 RX ORDER — TACROLIMUS 0.5 MG/1
0.5 CAPSULE ORAL 2 TIMES DAILY
Status: DISCONTINUED | OUTPATIENT
Start: 2023-05-17 | End: 2023-05-21 | Stop reason: HOSPADM

## 2023-05-16 RX ORDER — LIDOCAINE 560 MG/1
PATCH PERCUTANEOUS; TOPICAL; TRANSDERMAL
COMMUNITY
End: 2023-05-31

## 2023-05-16 RX ORDER — IBUPROFEN 200 MG
400 TABLET ORAL DAILY PRN
Status: ON HOLD | COMMUNITY
End: 2023-05-21 | Stop reason: HOSPADM

## 2023-05-16 RX ORDER — SODIUM CHLORIDE, SODIUM LACTATE, POTASSIUM CHLORIDE, CALCIUM CHLORIDE 600; 310; 30; 20 MG/100ML; MG/100ML; MG/100ML; MG/100ML
INJECTION, SOLUTION INTRAVENOUS CONTINUOUS
Status: ACTIVE | OUTPATIENT
Start: 2023-05-16 | End: 2023-05-16

## 2023-05-16 RX ORDER — DIPHENHYDRAMINE HCL 25 MG
25 CAPSULE ORAL DAILY PRN
COMMUNITY

## 2023-05-16 RX ADMIN — INSULIN ASPART 9 UNITS: 100 INJECTION, SOLUTION INTRAVENOUS; SUBCUTANEOUS at 10:05

## 2023-05-16 RX ADMIN — TACROLIMUS 1 MG: 1 CAPSULE ORAL at 09:05

## 2023-05-16 RX ADMIN — GABAPENTIN 600 MG: 400 CAPSULE ORAL at 09:05

## 2023-05-16 RX ADMIN — LISINOPRIL 40 MG: 20 TABLET ORAL at 09:05

## 2023-05-16 RX ADMIN — OXYCODONE AND ACETAMINOPHEN 1 TABLET: 10; 325 TABLET ORAL at 11:05

## 2023-05-16 RX ADMIN — NIFEDIPINE 90 MG: 60 TABLET, FILM COATED, EXTENDED RELEASE ORAL at 09:05

## 2023-05-16 RX ADMIN — SODIUM CHLORIDE, POTASSIUM CHLORIDE, SODIUM LACTATE AND CALCIUM CHLORIDE: 600; 310; 30; 20 INJECTION, SOLUTION INTRAVENOUS at 11:05

## 2023-05-16 RX ADMIN — ALLOPURINOL 100 MG: 300 TABLET ORAL at 09:05

## 2023-05-16 RX ADMIN — LEVOTHYROXINE SODIUM 88 MCG: 88 TABLET ORAL at 06:05

## 2023-05-16 RX ADMIN — HYDRALAZINE HYDROCHLORIDE 50 MG: 50 TABLET ORAL at 09:05

## 2023-05-16 RX ADMIN — ENOXAPARIN SODIUM 40 MG: 40 INJECTION SUBCUTANEOUS at 04:05

## 2023-05-16 RX ADMIN — Medication 1 TABLET: at 09:05

## 2023-05-16 RX ADMIN — TRAZODONE HYDROCHLORIDE 50 MG: 50 TABLET ORAL at 09:05

## 2023-05-16 RX ADMIN — INSULIN ASPART 9 UNITS: 100 INJECTION, SOLUTION INTRAVENOUS; SUBCUTANEOUS at 04:05

## 2023-05-16 RX ADMIN — ASPIRIN 81 MG CHEWABLE TABLET 81 MG: 81 TABLET CHEWABLE at 09:05

## 2023-05-16 RX ADMIN — OXYCODONE AND ACETAMINOPHEN 1 TABLET: 10; 325 TABLET ORAL at 10:05

## 2023-05-16 RX ADMIN — INSULIN DETEMIR 20 UNITS: 100 INJECTION, SOLUTION SUBCUTANEOUS at 09:05

## 2023-05-16 RX ADMIN — ISOSORBIDE DINITRATE 20 MG: 20 TABLET ORAL at 09:05

## 2023-05-16 RX ADMIN — ISOSORBIDE DINITRATE 20 MG: 20 TABLET ORAL at 03:05

## 2023-05-16 RX ADMIN — METOPROLOL SUCCINATE 200 MG: 100 TABLET, EXTENDED RELEASE ORAL at 09:05

## 2023-05-16 RX ADMIN — SERTRALINE 100 MG: 100 TABLET, FILM COATED ORAL at 09:05

## 2023-05-16 RX ADMIN — HYDRALAZINE HYDROCHLORIDE 50 MG: 50 TABLET ORAL at 03:05

## 2023-05-16 RX ADMIN — OXYCODONE AND ACETAMINOPHEN 1 TABLET: 10; 325 TABLET ORAL at 03:05

## 2023-05-16 RX ADMIN — THERA TABS 1 TABLET: TAB at 09:05

## 2023-05-16 RX ADMIN — GABAPENTIN 600 MG: 400 CAPSULE ORAL at 03:05

## 2023-05-16 RX ADMIN — TAMSULOSIN HYDROCHLORIDE 0.4 MG: 0.4 CAPSULE ORAL at 09:05

## 2023-05-16 RX ADMIN — OXYCODONE AND ACETAMINOPHEN 1 TABLET: 10; 325 TABLET ORAL at 06:05

## 2023-05-16 RX ADMIN — ATORVASTATIN CALCIUM 20 MG: 20 TABLET, FILM COATED ORAL at 09:05

## 2023-05-16 RX ADMIN — INSULIN ASPART 9 UNITS: 100 INJECTION, SOLUTION INTRAVENOUS; SUBCUTANEOUS at 09:05

## 2023-05-16 RX ADMIN — OXYCODONE HYDROCHLORIDE AND ACETAMINOPHEN 1 TABLET: 5; 325 TABLET ORAL at 06:05

## 2023-05-16 NOTE — TREATMENT PLAN
[facility]  Hepatology  Treatment Plan    Patient Name: Vick Gonzalez  MRN: [id number]  Admission Date: 5/12/2023  Hospital Length of Stay: 3 days    Subjective:     Interval History:  NAEON.    No current facility-administered medications on file prior to encounter.     Current Outpatient Medications on File Prior to Encounter   Medication Sig Dispense Refill    albuterol (VENTOLIN HFA) 90 mcg/actuation inhaler Inhale 2 puffs into the lungs every 6 (six) hours as needed for Wheezing or Shortness of Breath. Rescue 8.5 g 1    allopurinoL (ZYLOPRIM) 100 MG tablet TAKE 1 TABLET BY MOUTH TWICE A  tablet 3    aspirin 81 MG Chew Take 1 tablet (81 mg total) by mouth once daily. 90 tablet 3    calcium carbonate-vitamin D3 (CALCIUM 600 WITH VITAMIN D3) 600 mg(1,500mg) -400 unit Chew Take 1 tablet by mouth once daily.       clotrimazole (LOTRIMIN) 1 % cream APPLY TOPICALLY TWICE A DAY 30 g 1    gabapentin (NEURONTIN) 800 MG tablet Take 1 tablet (800 mg total) by mouth 3 (three) times daily. 90 tablet 11    hydrALAZINE (APRESOLINE) 25 MG tablet Take 25 mg by mouth every 12 (twelve) hours.      insulin glargine U-300 conc (TOUJEO MAX U-300 SOLOSTAR) 300 unit/mL (3 mL) insulin pen Inject 40 Units into the skin once daily. 3 mL 11    insulin lispro (HUMALOG KWIKPEN INSULIN) 100 unit/mL pen Inject 20 Units into the skin 3 (three) times daily with meals. 15 mL 11    levothyroxine (SYNTHROID) 88 MCG tablet TAKE 1 TABLET BY MOUTH EVERY DAY 90 tablet 1    lisinopriL (PRINIVIL,ZESTRIL) 40 MG tablet TAKE 1 TABLET BY MOUTH EVERY DAY 90 tablet 3    metoprolol succinate (TOPROL-XL) 200 MG 24 hr tablet TAKE 1 TABLET BY MOUTH EVERY DAY 90 tablet 3    multivitamin (THERAGRAN) per tablet Take 1 tablet by mouth once daily.       NIFEdipine (PROCARDIA-XL) 90 MG (OSM) 24 hr tablet Take 1 tablet (90 mg total) by mouth once daily. 90 tablet 3    ondansetron (ZOFRAN-ODT) 4 MG TbDL Take 1 tablet (4 mg total) by mouth every  "6 (six) hours as needed (Nausea). 15 tablet 0    rosuvastatin (CRESTOR) 5 MG tablet TAKE 1 TABLET BY MOUTH EVERY DAY 90 tablet 11    sertraline (ZOLOFT) 100 MG tablet TAKE 1 TABLET (100 MG TOTAL) BY MOUTH ONCE DAILY. 30 tablet 7    tacrolimus (PROGRAF) 1 MG Cap Take 2 capsules (2 mg total) by mouth every morning AND 1 capsule (1 mg total) every evening. 90 capsule 11    traZODone (DESYREL) 50 MG tablet TAKE 1 TABLET BY MOUTH EVERY EVENING. 90 tablet 3    aluminum-magnesium hydroxide-simethicone (MAALOX) 200-200-20 mg/5 mL Susp Take 30 mLs by mouth 4 (four) times daily before meals and nightly. 769 mL 0    blood sugar diagnostic (TRUE METRIX GLUCOSE TEST STRIP) Strp USE 3 TIMES DAILY TO TEST BLOOD GLUCOSE LEVEL 100 strip 11    blood-glucose meter Misc 1 Device by Misc.(Non-Drug; Combo Route) route 3 (three) times daily. 1 each 0    blood-glucose sensor (DEXCOM G6 SENSOR) Viviane Use as directed 3 each 11    blood-glucose transmitter (DEXCOM G6 TRANSMITTER) Viviane Use as directed 1 each 11    flash glucose sensor (FREESTYLE PALLAVI 2 SENSOR) Kit One sensor every 14 days 2 kit prn    glucose 4 GM chewable tablet Take 4 tablets (16 g total) by mouth as needed for Low blood sugar (If having symptoms of blurry vision, palpitations, confusion, shakiness.  Please check sugars and if sugar below 70 please take 4 tablets and re-check sugar everry 15 minutes until sugars are above 70 and symptoms resolve.). 50 tablet 12    lancets 30 gauge Misc 1 lancet by Misc.(Non-Drug; Combo Route) route 4 (four) times daily before meals and nightly. 200 each 11    NOVOFINE PLUS 32 gauge x 1/6" Ndle       [DISCONTINUED] insulin aspart U-100 (NOVOLOG FLEXPEN U-100 INSULIN) 100 unit/mL (3 mL) InPn pen Inject 20 Units into the skin 3 (three) times daily with meals. 15 mL 11       Review of patient's allergies indicates:  No Known Allergies      Objective:     Vitals:    05/16/23 1123   BP:    Pulse: 78   Resp:    Temp:        Significant " Labs:  Recent Labs   Lab 05/12/23 1802   HGB 11.7*       Lab Results   Component Value Date    WBC 7.99 05/12/2023    HGB 11.7 (L) 05/12/2023    HCT 38.2 (L) 05/12/2023    MCV 92 05/12/2023     (L) 05/12/2023       Lab Results   Component Value Date     05/16/2023    K 4.3 05/16/2023     05/16/2023    CO2 26 05/16/2023    BUN 32 (H) 05/16/2023    CREATININE 1.6 (H) 05/16/2023    CALCIUM 9.0 05/16/2023    ANIONGAP 11 05/16/2023    ESTGFRAFRICA >60.0 07/25/2022    EGFRNONAA 52.7 (A) 07/25/2022       Lab Results   Component Value Date    ALT 19 05/16/2023    AST 19 05/16/2023     (H) 04/24/2017    ALKPHOS 63 05/16/2023    BILITOT 0.3 05/16/2023       Lab Results   Component Value Date    INR 1.0 04/24/2023    INR 1.0 01/23/2023    INR 1.0 07/12/2022       Significant Imaging:  Reviewed pertinent radiology findings.       Assessment/Plan:   66yo PMHx EtOH / HCV cirrhosis s/p OLTx (2010) c/b recurrent fibrosis, IDDM, HTN, gout, hypothyroid, PAD presented to [facility] ED On 05/12 with complaints of SOB.     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 Please call with any additional questions, concerns or changes in the patient's clinical status. We will continue to follow.   Celestino Paredes MD  Gastroenterology Fellow PGY IV   [facility]

## 2023-05-16 NOTE — PHARMACY MED REC
"Admission Medication History     The home medication history was taken by Adriana Mayorga.    You may go to "Admission" then "Reconcile Home Medications" tabs to review and/or act upon these items.     The home medication list has been updated by the Pharmacy department.   Please read ALL comments highlighted in yellow.   Please address this information as you see fit.    Feel free to contact us if you have any questions or require assistance.      The medications listed below were removed from the home medication list. Please reorder if appropriate:  Patient reports no longer taking the following medication(s):  GLUCOSE 4 GM CHEW TAB  SERTRALINE 100 MG TAB    Medications listed below were obtained from: Patient/family  PTA Medications   Medication Sig    albuterol (VENTOLIN HFA) 90 mcg/actuation inhaler Inhale 2 puffs into the lungs every 6 (six) hours as needed for Wheezing or Shortness of Breath. Rescue      aluminum-magnesium hydroxide-simethicone (MAALOX) 200-200-20 mg/5 mL Susp   Take 30 mLs by mouth 4 (four) times daily before meals and nightly.    aspirin 81 MG Chew   Take 1 tablet (81 mg total) by mouth once daily.    calcium carbonate-vitamin D3 (CALCIUM 600 WITH VITAMIN D3) 600   mg(1,500mg) -400 unit Chew   Take 1 tablet by mouth once daily.     clotrimazole (LOTRIMIN) 1 % cream   APPLY TOPICALLY TWICE A DAY    CYANOCOBALAMIN, VITAMIN B-12, ORAL   Chew 1 tablet by mouth 2 to 3 times a month.    diphenhydrAMINE (BENADRYL) 25 mg capsule   Take 25 mg by mouth daily as needed for Allergies (Cold).    gabapentin (NEURONTIN) 800 MG tablet   Take 1 tablet (800 mg total) by mouth 3 (three) times daily.    ibuprofen (ADVIL,MOTRIN) 200 MG tablet   Take 400 mg by mouth daily as needed for Pain.    insulin glargine U-300 conc (TOUJEO MAX U-300   SOLOSTAR) 300 unit/mL (3 mL) insulin pen   Inject 40 Units into the skin once daily.    insulin lispro (HUMALOG KWIKPEN INSULIN) 100 unit/mL pen   Inject 20 Units into the skin 3 " "(three) times daily with meals.    levothyroxine (SYNTHROID) 88 MCG tablet   TAKE 1 TABLET BY MOUTH EVERY DAY    LIDOcaine 4 % PtMd Apply topically to lower back once daily as needed for pain.      lisinopriL (PRINIVIL,ZESTRIL) 40 MG tablet   TAKE 1 TABLET BY MOUTH EVERY DAY    metoprolol succinate (TOPROL-XL) 200 MG 24 hr tablet   TAKE 1 TABLET BY MOUTH EVERY DAY    multivitamin (THERAGRAN) per tablet   Take 1 tablet by mouth once daily.     NIFEdipine (PROCARDIA-XL) 90 MG (OSM) 24 hr tablet   Take 1 tablet (90 mg total) by mouth once daily.    ondansetron (ZOFRAN-ODT) 4 MG TbDL   Take 1 tablet (4 mg total) by mouth every 6 (six) hours as needed (Nausea).    tacrolimus (PROGRAF) 1 MG Cap   Take 2 capsules (2 mg total) by mouth every morning AND 1 capsule (1 mg total) every evening.    traZODone (DESYREL) 50 MG tablet   TAKE 1 TABLET BY MOUTH EVERY EVENING.    allopurinoL (ZYLOPRIM) 100 MG tablet   TAKE 1 TABLET BY MOUTH TWICE A DAY  LAST FILLED 12/3/22 #180/90    blood sugar diagnostic (TRUE METRIX GLUCOSE TEST STRIP) Strp   USE 3 TIMES DAILY TO TEST BLOOD GLUCOSE LEVEL    blood-glucose meter Misc 1 Device by Misc.(Non-Drug; Combo Route) route 3 (three) times daily.      blood-glucose sensor (DEXCOM G6 SENSOR) Viviane   Use as directed    blood-glucose transmitter (DEXCOM G6 TRANSMITTER) Viviane   Use as directed    flash glucose sensor (FREESTYLE PALLAVI 2 SENSOR) Kit   One sensor every 14 days    hydrALAZINE (APRESOLINE) 25 MG tablet   Take 25 mg by mouth every 12 (twelve) hours.  LAST FILLED 12/3/22 #180/90    lancets 30 gauge Misc 1 lancet by Misc.(Non-Drug; Combo Route) route 4 (four) times daily before meals and nightly.    NOVOFINE PLUS 32 gauge x 1/6" Ndle       rosuvastatin (CRESTOR) 5 MG tablet TAKE 1 TABLET BY MOUTH EVERY DAY  LAST FILLED 12/3/22 #180/90     Potential issues to be addressed PRIOR TO DISCHARGE  Patient requires education regarding drug therapies     Patient requested refills for the following " medications: (VENTOLIN, ZYLOPRIM)          Adriana Mayorga  EXT [phone]                  .

## 2023-05-16 NOTE — SUBJECTIVE & OBJECTIVE
Interval History: no acute events overnight. Giving 500 cc over 5 hrs to help with possible OSMANY due to overdiuresis    Review of Systems   Constitutional:  Positive for appetite change (decrease). Negative for chills and fever.   HENT:  Negative for congestion and sore throat.    Eyes:  Negative for photophobia and visual disturbance.   Respiratory:  Negative for cough, chest tightness and shortness of breath.    Cardiovascular:  Positive for leg swelling. Negative for chest pain and palpitations.   Gastrointestinal:  Positive for abdominal distention. Negative for abdominal pain, blood in stool, constipation, diarrhea, nausea and vomiting.   Endocrine: Negative for cold intolerance and heat intolerance.   Genitourinary:  Negative for dysuria and hematuria.   Musculoskeletal:  Negative for arthralgias and myalgias.   Skin:  Negative for rash and wound.   Allergic/Immunologic: Negative for environmental allergies and food allergies.   Neurological:  Negative for seizures and numbness.   Hematological:  Negative for adenopathy. Does not bruise/bleed easily.   Psychiatric/Behavioral:  Negative for hallucinations and suicidal ideas.    Objective:     Vital Signs (Most Recent):  Temp: 97.5 °F (36.4 °C) (05/16/23 1504)  Pulse: 70 (05/16/23 1504)  Resp: 18 (05/16/23 1506)  BP: 122/64 (05/16/23 1504)  SpO2: 96 % (05/16/23 1504) Vital Signs (24h Range):  Temp:  [97.4 °F (36.3 °C)-97.8 °F (36.6 °C)] 97.5 °F (36.4 °C)  Pulse:  [68-84] 70  Resp:  [16-20] 18  SpO2:  [93 %-97 %] 96 %  BP: (105-135)/(57-75) 122/64     Weight: 120.6 kg (265 lb 14 oz)  Body mass index is 35.08 kg/m².    Intake/Output Summary (Last 24 hours) at 5/16/2023 1542  Last data filed at 5/16/2023 1505  Gross per 24 hour   Intake 1302 ml   Output 1400 ml   Net -98 ml           Physical Exam  Constitutional:       Appearance: He is well-developed.   HENT:      Head: Normocephalic and atraumatic.   Eyes:      Conjunctiva/sclera: Conjunctivae normal.       Pupils: Pupils are equal, round, and reactive to light.   Neck:      Thyroid: No thyromegaly.      Vascular: No JVD.   Cardiovascular:      Rate and Rhythm: Normal rate and regular rhythm.      Heart sounds: Normal heart sounds. No murmur heard.    No friction rub. No gallop.   Pulmonary:      Effort: Pulmonary effort is normal. No respiratory distress.      Breath sounds: Examination of the right-lower field reveals rales. Rales present. No wheezing.   Abdominal:      General: Bowel sounds are normal. There is no distension.      Palpations: Abdomen is soft. There is no mass.      Tenderness: There is no abdominal tenderness. There is no guarding or rebound.      Hernia: No hernia is present.      Comments: Hernia     Musculoskeletal:         General: No deformity. Normal range of motion.      Cervical back: Normal range of motion and neck supple.      Right lower leg: Edema present.      Left lower leg: Edema present.   Skin:     General: Skin is warm and dry.   Neurological:      Mental Status: He is alert and oriented to person, place, and time.      Cranial Nerves: No cranial nerve deficit.   Psychiatric:         Behavior: Behavior normal.           Significant Labs: All pertinent labs within the past 24 hours have been reviewed.  CBC:   No results for input(s): WBC, HGB, HCT, PLT in the last 48 hours.    CMP:   Recent Labs   Lab 05/15/23  0503 05/16/23  0531 05/16/23  1335    138 139   K 4.1 4.3 4.5   CL 99 101 101   CO2 30* 26 28   * 188* 95   BUN 26* 32* 34*   CREATININE 1.5* 1.6* 1.6*   CALCIUM 9.1 9.0 9.2   PROT 6.3 6.8  --    ALBUMIN 3.3* 3.5 3.8   BILITOT 0.3 0.3  --    ALKPHOS 61 63  --    AST 16 19  --    ALT 19 19  --    ANIONGAP 11 11 10         Significant Imaging: I have reviewed all pertinent imaging results/findings within the past 24 hours.

## 2023-05-16 NOTE — ASSESSMENT & PLAN NOTE
Patient with acute kidney injury likely due to pre-renal azotemia OSMANY is currently worsening. Labs reviewed- Renal function/electrolytes with Estimated Creatinine Clearance: 62.6 mL/min (A) (based on SCr of 1.6 mg/dL (H)). according to latest data. Monitor urine output and serial BMP and adjust therapy as needed. Avoid nephrotoxins and renally dose meds for GFR listed above.   - believed to be secondary to over diuresis  - hold lasix still  - will give 100cc/hr for 5 hours and recheck bmp

## 2023-05-16 NOTE — PROGRESS NOTES
[facility] - Cardiology [facility] Medicine  Progress Note    Patient Name: Vick Gonzalez  MRN: [id number]  Patient Class: IP- Inpatient   Admission Date: 5/12/2023  Length of Stay: 3 days  Attending Physician: Sailaja Ashley MD  Primary Care Provider: Emanuel Lopez MD        Subjective:     Principal Problem:Acute congestive heart failure        HPI:  67-year-old  man with a history of alcoholic liver disease status post liver transplant, HCV status post viral treatment, type 2 diabetes on insulin hypertension, gout, hypothyroidism and PAD presents the emergency department with complaints shortness of breath and peripheral edema.    He reports that over.  Week he has developed worsening progressive lower extremity edema and has noted intermittent dyspnea on exertion and having some bouts acute dyspnea that occurred her randomly at rest.  He denies chest pain at this time , no chest pressure.   No reported his of CAD or heart failure in the past.  He states prior to his liver transplant that hes experienced peripheral edema.   He reports adherence to home medications/antihypertensive regimen.  Blood pressure elevated on vitals today.  He also feels increased abdominal girth.     He is non-smoker, no recent alcohol use, no drug use.  He reports no increased fluid intake, but diet discussion states his son has been working at [employer] and bringing home fried catfish dinner plate frequently over recent weeks. Patient later eating a taco his wife brought him after my initial visit, has had increased sodium intake reported.     ED Treatment: Lasix 40mg IV x 1 - Lidocaine patch, oxycodone 5mg.           Overview/Hospital Course:  No issues since admission. Diuersing well. EF 60%, CVP around 8. Holding diuretics on 5/15 given concern of overdiuresis. Giving slight fluids on 5/16 due to worsening cr, urine studies ordered.       Interval History: no acute events  overnight. Giving 500 cc over 5 hrs to help with possible OSMANY due to overdiuresis    Review of Systems   Constitutional:  Positive for appetite change (decrease). Negative for chills and fever.   HENT:  Negative for congestion and sore throat.    Eyes:  Negative for photophobia and visual disturbance.   Respiratory:  Negative for cough, chest tightness and shortness of breath.    Cardiovascular:  Positive for leg swelling. Negative for chest pain and palpitations.   Gastrointestinal:  Positive for abdominal distention. Negative for abdominal pain, blood in stool, constipation, diarrhea, nausea and vomiting.   Endocrine: Negative for cold intolerance and heat intolerance.   Genitourinary:  Negative for dysuria and hematuria.   Musculoskeletal:  Negative for arthralgias and myalgias.   Skin:  Negative for rash and wound.   Allergic/Immunologic: Negative for environmental allergies and food allergies.   Neurological:  Negative for seizures and numbness.   Hematological:  Negative for adenopathy. Does not bruise/bleed easily.   Psychiatric/Behavioral:  Negative for hallucinations and suicidal ideas.    Objective:     Vital Signs (Most Recent):  Temp: 97.5 °F (36.4 °C) (05/16/23 1504)  Pulse: 70 (05/16/23 1504)  Resp: 18 (05/16/23 1506)  BP: 122/64 (05/16/23 1504)  SpO2: 96 % (05/16/23 1504) Vital Signs (24h Range):  Temp:  [97.4 °F (36.3 °C)-97.8 °F (36.6 °C)] 97.5 °F (36.4 °C)  Pulse:  [68-84] 70  Resp:  [16-20] 18  SpO2:  [93 %-97 %] 96 %  BP: (105-135)/(57-75) 122/64     Weight: 120.6 kg (265 lb 14 oz)  Body mass index is 35.08 kg/m².    Intake/Output Summary (Last 24 hours) at 5/16/2023 1542  Last data filed at 5/16/2023 1505  Gross per 24 hour   Intake 1302 ml   Output 1400 ml   Net -98 ml           Physical Exam  Constitutional:       Appearance: He is well-developed.   HENT:      Head: Normocephalic and atraumatic.   Eyes:      Conjunctiva/sclera: Conjunctivae normal.      Pupils: Pupils are equal, round, and  reactive to light.   Neck:      Thyroid: No thyromegaly.      Vascular: No JVD.   Cardiovascular:      Rate and Rhythm: Normal rate and regular rhythm.      Heart sounds: Normal heart sounds. No murmur heard.    No friction rub. No gallop.   Pulmonary:      Effort: Pulmonary effort is normal. No respiratory distress.      Breath sounds: Examination of the right-lower field reveals rales. Rales present. No wheezing.   Abdominal:      General: Bowel sounds are normal. There is no distension.      Palpations: Abdomen is soft. There is no mass.      Tenderness: There is no abdominal tenderness. There is no guarding or rebound.      Hernia: No hernia is present.      Comments: Hernia     Musculoskeletal:         General: No deformity. Normal range of motion.      Cervical back: Normal range of motion and neck supple.      Right lower leg: Edema present.      Left lower leg: Edema present.   Skin:     General: Skin is warm and dry.   Neurological:      Mental Status: He is alert and oriented to person, place, and time.      Cranial Nerves: No cranial nerve deficit.   Psychiatric:         Behavior: Behavior normal.           Significant Labs: All pertinent labs within the past 24 hours have been reviewed.  CBC:   No results for input(s): WBC, HGB, HCT, PLT in the last 48 hours.    CMP:   Recent Labs   Lab 05/15/23  0503 05/16/23  0531 05/16/23  1335    138 139   K 4.1 4.3 4.5   CL 99 101 101   CO2 30* 26 28   * 188* 95   BUN 26* 32* 34*   CREATININE 1.5* 1.6* 1.6*   CALCIUM 9.1 9.0 9.2   PROT 6.3 6.8  --    ALBUMIN 3.3* 3.5 3.8   BILITOT 0.3 0.3  --    ALKPHOS 61 63  --    AST 16 19  --    ALT 19 19  --    ANIONGAP 11 11 10         Significant Imaging: I have reviewed all pertinent imaging results/findings within the past 24 hours.      Assessment/Plan:      * Acute congestive heart failure  Patient is identified as having Unknown presumed DIASTOLIC CHF - ECHO IS PENDING heart failure that is Acute. CHF is  currently uncontrolled due to Continued edema of extremities and Pulmonary edema/pleural effusion on CXR.  Continue Beta Blocker, ACE/ARB, Furosemide and Nitrate/Vasodilator and monitor clinical status closely. Monitor on telemetry. Patient is on CHF pathway.  Monitor strict Is&Os and daily weights.  Place on fluid restriction of 1.5 L. Continue to stress to patient importance of self efficacy and  on diet for CHF. Last BNP reviewed- and noted below   Recent Labs   Lab 05/12/23  1802   BNP 46   - Keep on Tele monitoring  - TTE performed on 5/15, EF 60% with CVP of around 8, patient appears to have hfpef  - Hold Lasix 60mg IV BID given OSMANY  -- monitor for resolution with holding diuretics  - Increase hydralazine to 50mg TID and continue ISDN 20mg TID  - Continue metoprolol  - Continue Lisinopril 40mg po daily       OSMANY (acute kidney injury)  Patient with acute kidney injury likely due to pre-renal azotemia OSMANY is currently worsening. Labs reviewed- Renal function/electrolytes with Estimated Creatinine Clearance: 62.6 mL/min (A) (based on SCr of 1.6 mg/dL (H)). according to latest data. Monitor urine output and serial BMP and adjust therapy as needed. Avoid nephrotoxins and renally dose meds for GFR listed above.   - believed to be secondary to over diuresis  - hold lasix still  - will give 100cc/hr for 5 hours and recheck bmp      Hypomagnesemia  Replete magnesium prn      Diabetic polyneuropathy associated with type 2 diabetes mellitus  Bilateral neuropathy at feet - continue gabapentin  Also has chronic LUE pain, left hand numbness - has had MRI cervical spine this year with some neuro-foraminal stenosis      PAD (peripheral artery disease)  Continue aspirin/statin      CKD (chronic kidney disease), stage III  Monitor daily renal function while on diuretics      S/P liver transplant  Continue tacrolimus 2mg in AM, 1mg in evening  - check tacrolimus level in AM  - Hepatology following    Type 2 diabetes  mellitus with diabetic polyneuropathy, with long-term current use of insulin  Patient's FSGs are controlled on current medication regimen.  Last A1c reviewed-   Lab Results   Component Value Date    HGBA1C 6.8 (H) 05/13/2023     Most recent fingerstick glucose reviewed-   Recent Labs   Lab 05/13/23  1543 05/13/23  2123 05/14/23  0816   POCTGLUCOSE 118* 178* 181*     Current correctional scale  Medium  Maintain anti-hyperglycemic dose as follows-   Antihyperglycemics (From admission, onward)    Start     Stop Route Frequency Ordered    05/13/23 0900  insulin detemir U-100 (Levemir) pen 20 Units         -- SubQ Daily 05/13/23 0712    05/13/23 0715  insulin aspart U-100 pen 9 Units         -- SubQ 3 times daily with meals 05/13/23 0712    05/13/23 0057  insulin aspart U-100 pen 1-10 Units         -- SubQ Before meals & nightly PRN 05/12/23 2358        Hold Oral hypoglycemics while patient is in the hospital.      Essential hypertension  As per primary problem      Gout, unspecified  Chronic/stable  Continue allopurinol bid      Acquired hypothyroidism  Normal TSH/T4  - Continue home levothyroxine        VTE Risk Mitigation (From admission, onward)         Ordered     enoxaparin injection 40 mg  Daily         05/12/23 2357     IP VTE HIGH RISK PATIENT  Once         05/12/23 2357     Place sequential compression device  Until discontinued         05/12/23 2357                Discharge Planning   JOJO: 5/17/2023     Code Status: Full Code   Is the patient medically ready for discharge?: No    Reason for patient still in hospital (select all that apply): Patient trending condition  Discharge Plan A: Home with family                  Sailaja Ashley MD  Department of Hospital Medicine   [facility] - Cardiology Stepdown

## 2023-05-16 NOTE — PLAN OF CARE
Problem: Adult Inpatient Plan of Care  Goal: Plan of Care Review  Outcome: Ongoing, Progressing  Goal: Patient-Specific Goal (Individualized)  Outcome: Ongoing, Progressing  Goal: Absence of Hospital-Acquired Illness or Injury  Outcome: Ongoing, Progressing     Problem: Diabetes Comorbidity  Goal: Blood Glucose Level Within Targeted Range  Outcome: Ongoing, Progressing     Problem: Fluid and Electrolyte Imbalance (Acute Kidney Injury/Impairment)  Goal: Fluid and Electrolyte Balance  Outcome: Ongoing, Progressing

## 2023-05-17 LAB
ALBUMIN SERPL BCP-MCNC: 3.4 G/DL (ref 3.5–5.2)
ALBUMIN SERPL BCP-MCNC: 3.9 G/DL (ref 3.5–5.2)
ALP SERPL-CCNC: 57 U/L (ref 55–135)
ALT SERPL W/O P-5'-P-CCNC: 21 U/L (ref 10–44)
ANION GAP SERPL CALC-SCNC: 10 MMOL/L (ref 8–16)
ANION GAP SERPL CALC-SCNC: 13 MMOL/L (ref 8–16)
AST SERPL-CCNC: 22 U/L (ref 10–40)
BILIRUB SERPL-MCNC: 0.3 MG/DL (ref 0.1–1)
BUN SERPL-MCNC: 34 MG/DL (ref 8–23)
BUN SERPL-MCNC: 38 MG/DL (ref 8–23)
CALCIUM SERPL-MCNC: 9.2 MG/DL (ref 8.7–10.5)
CALCIUM SERPL-MCNC: 9.9 MG/DL (ref 8.7–10.5)
CHLORIDE SERPL-SCNC: 100 MMOL/L (ref 95–110)
CHLORIDE SERPL-SCNC: 103 MMOL/L (ref 95–110)
CO2 SERPL-SCNC: 26 MMOL/L (ref 23–29)
CO2 SERPL-SCNC: 26 MMOL/L (ref 23–29)
CREAT SERPL-MCNC: 1.5 MG/DL (ref 0.5–1.4)
CREAT SERPL-MCNC: 2 MG/DL (ref 0.5–1.4)
EST. GFR  (NO RACE VARIABLE): 36.4 ML/MIN/1.73 M^2
EST. GFR  (NO RACE VARIABLE): 51.3 ML/MIN/1.73 M^2
GLUCOSE SERPL-MCNC: 127 MG/DL (ref 70–110)
GLUCOSE SERPL-MCNC: 99 MG/DL (ref 70–110)
MAGNESIUM SERPL-MCNC: 1.8 MG/DL (ref 1.6–2.6)
PHOSPHATE SERPL-MCNC: 4 MG/DL (ref 2.7–4.5)
POCT GLUCOSE: 129 MG/DL (ref 70–110)
POCT GLUCOSE: 152 MG/DL (ref 70–110)
POCT GLUCOSE: 166 MG/DL (ref 70–110)
POTASSIUM SERPL-SCNC: 4.7 MMOL/L (ref 3.5–5.1)
POTASSIUM SERPL-SCNC: 5.1 MMOL/L (ref 3.5–5.1)
PROT SERPL-MCNC: 6.6 G/DL (ref 6–8.4)
SODIUM SERPL-SCNC: 139 MMOL/L (ref 136–145)
SODIUM SERPL-SCNC: 139 MMOL/L (ref 136–145)
TACROLIMUS BLD-MCNC: 3.9 NG/ML (ref 5–15)

## 2023-05-17 PROCEDURE — 94761 N-INVAS EAR/PLS OXIMETRY MLT: CPT

## 2023-05-17 PROCEDURE — 99233 SBSQ HOSP IP/OBS HIGH 50: CPT | Mod: ,,, | Performed by: STUDENT IN AN ORGANIZED HEALTH CARE EDUCATION/TRAINING PROGRAM

## 2023-05-17 PROCEDURE — 63600175 PHARM REV CODE 636 W HCPCS: Performed by: STUDENT IN AN ORGANIZED HEALTH CARE EDUCATION/TRAINING PROGRAM

## 2023-05-17 PROCEDURE — 99233 PR SUBSEQUENT HOSPITAL CARE,LEVL III: ICD-10-PCS | Mod: ,,, | Performed by: STUDENT IN AN ORGANIZED HEALTH CARE EDUCATION/TRAINING PROGRAM

## 2023-05-17 PROCEDURE — P9047 ALBUMIN (HUMAN), 25%, 50ML: HCPCS | Mod: JZ,JG | Performed by: STUDENT IN AN ORGANIZED HEALTH CARE EDUCATION/TRAINING PROGRAM

## 2023-05-17 PROCEDURE — 80197 ASSAY OF TACROLIMUS: CPT | Performed by: STUDENT IN AN ORGANIZED HEALTH CARE EDUCATION/TRAINING PROGRAM

## 2023-05-17 PROCEDURE — 36415 COLL VENOUS BLD VENIPUNCTURE: CPT | Performed by: STUDENT IN AN ORGANIZED HEALTH CARE EDUCATION/TRAINING PROGRAM

## 2023-05-17 PROCEDURE — 63600175 PHARM REV CODE 636 W HCPCS: Performed by: HOSPITALIST

## 2023-05-17 PROCEDURE — 25000003 PHARM REV CODE 250: Performed by: STUDENT IN AN ORGANIZED HEALTH CARE EDUCATION/TRAINING PROGRAM

## 2023-05-17 PROCEDURE — 80069 RENAL FUNCTION PANEL: CPT | Performed by: STUDENT IN AN ORGANIZED HEALTH CARE EDUCATION/TRAINING PROGRAM

## 2023-05-17 PROCEDURE — 25000003 PHARM REV CODE 250: Performed by: HOSPITALIST

## 2023-05-17 PROCEDURE — 83735 ASSAY OF MAGNESIUM: CPT | Performed by: HOSPITALIST

## 2023-05-17 PROCEDURE — 20600001 HC STEP DOWN PRIVATE ROOM

## 2023-05-17 PROCEDURE — 80053 COMPREHEN METABOLIC PANEL: CPT | Performed by: STUDENT IN AN ORGANIZED HEALTH CARE EDUCATION/TRAINING PROGRAM

## 2023-05-17 RX ORDER — ALBUMIN HUMAN 250 G/1000ML
25 SOLUTION INTRAVENOUS EVERY 12 HOURS
Status: DISCONTINUED | OUTPATIENT
Start: 2023-05-17 | End: 2023-05-17

## 2023-05-17 RX ORDER — ALBUMIN HUMAN 250 G/1000ML
25 SOLUTION INTRAVENOUS ONCE
Status: COMPLETED | OUTPATIENT
Start: 2023-05-17 | End: 2023-05-17

## 2023-05-17 RX ORDER — FUROSEMIDE 10 MG/ML
80 INJECTION INTRAMUSCULAR; INTRAVENOUS ONCE
Status: COMPLETED | OUTPATIENT
Start: 2023-05-17 | End: 2023-05-17

## 2023-05-17 RX ADMIN — GABAPENTIN 600 MG: 400 CAPSULE ORAL at 08:05

## 2023-05-17 RX ADMIN — ISOSORBIDE DINITRATE 20 MG: 20 TABLET ORAL at 08:05

## 2023-05-17 RX ADMIN — ISOSORBIDE DINITRATE 20 MG: 20 TABLET ORAL at 09:05

## 2023-05-17 RX ADMIN — OXYCODONE AND ACETAMINOPHEN 1 TABLET: 10; 325 TABLET ORAL at 06:05

## 2023-05-17 RX ADMIN — OXYCODONE AND ACETAMINOPHEN 1 TABLET: 10; 325 TABLET ORAL at 10:05

## 2023-05-17 RX ADMIN — LEVOTHYROXINE SODIUM 88 MCG: 88 TABLET ORAL at 06:05

## 2023-05-17 RX ADMIN — ALBUMIN HUMAN 25 G: 0.25 SOLUTION INTRAVENOUS at 07:05

## 2023-05-17 RX ADMIN — ALLOPURINOL 100 MG: 300 TABLET ORAL at 09:05

## 2023-05-17 RX ADMIN — SERTRALINE 100 MG: 100 TABLET, FILM COATED ORAL at 09:05

## 2023-05-17 RX ADMIN — NIFEDIPINE 90 MG: 60 TABLET, FILM COATED, EXTENDED RELEASE ORAL at 09:05

## 2023-05-17 RX ADMIN — ENOXAPARIN SODIUM 40 MG: 40 INJECTION SUBCUTANEOUS at 05:05

## 2023-05-17 RX ADMIN — TRAZODONE HYDROCHLORIDE 50 MG: 50 TABLET ORAL at 08:05

## 2023-05-17 RX ADMIN — TAMSULOSIN HYDROCHLORIDE 0.4 MG: 0.4 CAPSULE ORAL at 09:05

## 2023-05-17 RX ADMIN — INSULIN DETEMIR 20 UNITS: 100 INJECTION, SOLUTION SUBCUTANEOUS at 09:05

## 2023-05-17 RX ADMIN — Medication 1 TABLET: at 09:05

## 2023-05-17 RX ADMIN — ATORVASTATIN CALCIUM 20 MG: 20 TABLET, FILM COATED ORAL at 09:05

## 2023-05-17 RX ADMIN — ALLOPURINOL 100 MG: 300 TABLET ORAL at 08:05

## 2023-05-17 RX ADMIN — INSULIN ASPART 9 UNITS: 100 INJECTION, SOLUTION INTRAVENOUS; SUBCUTANEOUS at 11:05

## 2023-05-17 RX ADMIN — THERA TABS 1 TABLET: TAB at 09:05

## 2023-05-17 RX ADMIN — FUROSEMIDE 80 MG: 10 INJECTION, SOLUTION INTRAMUSCULAR; INTRAVENOUS at 09:05

## 2023-05-17 RX ADMIN — LISINOPRIL 40 MG: 20 TABLET ORAL at 09:05

## 2023-05-17 RX ADMIN — HYDRALAZINE HYDROCHLORIDE 50 MG: 50 TABLET ORAL at 09:05

## 2023-05-17 RX ADMIN — GABAPENTIN 600 MG: 400 CAPSULE ORAL at 09:05

## 2023-05-17 RX ADMIN — TACROLIMUS 0.5 MG: 0.5 CAPSULE ORAL at 05:05

## 2023-05-17 RX ADMIN — INSULIN ASPART 9 UNITS: 100 INJECTION, SOLUTION INTRAVENOUS; SUBCUTANEOUS at 09:05

## 2023-05-17 RX ADMIN — TACROLIMUS 0.5 MG: 0.5 CAPSULE ORAL at 09:05

## 2023-05-17 RX ADMIN — OXYCODONE AND ACETAMINOPHEN 1 TABLET: 10; 325 TABLET ORAL at 02:05

## 2023-05-17 RX ADMIN — INSULIN ASPART 9 UNITS: 100 INJECTION, SOLUTION INTRAVENOUS; SUBCUTANEOUS at 05:05

## 2023-05-17 RX ADMIN — METOPROLOL SUCCINATE 200 MG: 100 TABLET, EXTENDED RELEASE ORAL at 09:05

## 2023-05-17 RX ADMIN — OXYCODONE AND ACETAMINOPHEN 1 TABLET: 10; 325 TABLET ORAL at 11:05

## 2023-05-17 RX ADMIN — Medication 1 TABLET: at 08:05

## 2023-05-17 RX ADMIN — HYDRALAZINE HYDROCHLORIDE 50 MG: 50 TABLET ORAL at 02:05

## 2023-05-17 RX ADMIN — GABAPENTIN 600 MG: 400 CAPSULE ORAL at 02:05

## 2023-05-17 RX ADMIN — HYDRALAZINE HYDROCHLORIDE 50 MG: 50 TABLET ORAL at 08:05

## 2023-05-17 RX ADMIN — ISOSORBIDE DINITRATE 20 MG: 20 TABLET ORAL at 02:05

## 2023-05-17 RX ADMIN — ASPIRIN 81 MG CHEWABLE TABLET 81 MG: 81 TABLET CHEWABLE at 09:05

## 2023-05-17 NOTE — SUBJECTIVE & OBJECTIVE
Interval History: no acute events overnight. Patient's renal function did not improve with fluids, may be a component of cardiorenal as his urine studies demonstrated pre-renal etiology. Furthermore, lasix trial today met with improved UOP. Patient reports improvement in breathing, no rales appreciated on exam    Review of Systems   Constitutional:  Positive for appetite change (decrease). Negative for chills and fever.   HENT:  Negative for congestion and sore throat.    Eyes:  Negative for photophobia and visual disturbance.   Respiratory:  Negative for cough, chest tightness and shortness of breath.    Cardiovascular:  Positive for leg swelling. Negative for chest pain and palpitations.   Gastrointestinal:  Positive for abdominal distention. Negative for abdominal pain, blood in stool, constipation, diarrhea, nausea and vomiting.   Endocrine: Negative for cold intolerance and heat intolerance.   Genitourinary:  Negative for dysuria and hematuria.   Musculoskeletal:  Negative for arthralgias and myalgias.   Skin:  Negative for rash and wound.   Allergic/Immunologic: Negative for environmental allergies and food allergies.   Neurological:  Negative for seizures and numbness.   Hematological:  Negative for adenopathy. Does not bruise/bleed easily.   Psychiatric/Behavioral:  Negative for hallucinations and suicidal ideas.    Objective:     Vital Signs (Most Recent):  Temp: 97.6 °F (36.4 °C) (05/17/23 1111)  Pulse: 73 (05/17/23 1111)  Resp: 18 (05/17/23 1111)  BP: 104/65 (05/17/23 1111)  SpO2: (!) 94 % (05/17/23 1111) Vital Signs (24h Range):  Temp:  [97.5 °F (36.4 °C)-97.7 °F (36.5 °C)] 97.6 °F (36.4 °C)  Pulse:  [66-73] 73  Resp:  [16-20] 18  SpO2:  [94 %-96 %] 94 %  BP: (104-149)/(64-83) 104/65     Weight: 121.3 kg (267 lb 6.7 oz)  Body mass index is 35.28 kg/m².    Intake/Output Summary (Last 24 hours) at 5/17/2023 1334  Last data filed at 5/17/2023 1113  Gross per 24 hour   Intake 1884 ml   Output 3770 ml   Net  -1886 ml           Physical Exam  Constitutional:       Appearance: He is well-developed.   HENT:      Head: Normocephalic and atraumatic.   Eyes:      Conjunctiva/sclera: Conjunctivae normal.      Pupils: Pupils are equal, round, and reactive to light.   Neck:      Thyroid: No thyromegaly.      Vascular: No JVD.   Cardiovascular:      Rate and Rhythm: Normal rate and regular rhythm.      Heart sounds: Normal heart sounds. No murmur heard.    No friction rub. No gallop.   Pulmonary:      Effort: Pulmonary effort is normal. No respiratory distress.      Breath sounds: No wheezing or rales.   Abdominal:      General: Bowel sounds are normal. There is no distension.      Palpations: Abdomen is soft. There is no mass.      Tenderness: There is no abdominal tenderness. There is no guarding or rebound.      Hernia: No hernia is present.      Comments: Hernia     Musculoskeletal:         General: No deformity. Normal range of motion.      Cervical back: Normal range of motion and neck supple.      Right lower leg: Edema present.      Left lower leg: Edema present.   Skin:     General: Skin is warm and dry.   Neurological:      Mental Status: He is alert and oriented to person, place, and time.      Cranial Nerves: No cranial nerve deficit.   Psychiatric:         Behavior: Behavior normal.           Significant Labs: All pertinent labs within the past 24 hours have been reviewed.  CBC:   No results for input(s): WBC, HGB, HCT, PLT in the last 48 hours.    CMP:   Recent Labs   Lab 05/16/23  0531 05/16/23  1335 05/17/23  0450    139 139   K 4.3 4.5 4.7    101 103   CO2 26 28 26   * 95 99   BUN 32* 34* 34*   CREATININE 1.6* 1.6* 1.5*   CALCIUM 9.0 9.2 9.2   PROT 6.8  --  6.6   ALBUMIN 3.5 3.8 3.4*   BILITOT 0.3  --  0.3   ALKPHOS 63  --  57   AST 19  --  22   ALT 19  --  21   ANIONGAP 11 10 10         Significant Imaging: I have reviewed all pertinent imaging results/findings within the past 24 hours.

## 2023-05-17 NOTE — PROGRESS NOTES
[facility] Medicine  Progress Note    Patient Name: Vick Gonzalez  MRN: [id number]  Patient Class: IP- Inpatient   Admission Date: 5/12/2023  Length of Stay: 4 days  Attending Physician: Sailaja Ashley MD  Primary Care Provider: Emanuel Lopez MD        Subjective:     Principal Problem:Acute congestive heart failure        HPI:  67-year-old  man with a history of alcoholic liver disease status post liver transplant, HCV status post viral treatment, type 2 diabetes on insulin hypertension, gout, hypothyroidism and PAD presents the emergency department with complaints shortness of breath and peripheral edema.    He reports that over.  Week he has developed worsening progressive lower extremity edema and has noted intermittent dyspnea on exertion and having some bouts acute dyspnea that occurred her randomly at rest.  He denies chest pain at this time , no chest pressure.   No reported his of CAD or heart failure in the past.  He states prior to his liver transplant that hes experienced peripheral edema.   He reports adherence to home medications/antihypertensive regimen.  Blood pressure elevated on vitals today.  He also feels increased abdominal girth.     He is non-smoker, no recent alcohol use, no drug use.  He reports no increased fluid intake, but diet discussion states his son has been working at [employer] and bringing home fried catfish dinner plate frequently over recent weeks. Patient later eating a taco his wife brought him after my initial visit, has had increased sodium intake reported.     ED Treatment: Lasix 40mg IV x 1 - Lidocaine patch, oxycodone 5mg.           Overview/Hospital Course:  No issues since admission. Diuersing well. EF 60%, CVP around 8. Holding diuretics on 5/15 given concern of overdiuresis. Giving slight fluids on 5/16 due to worsening cr, urine studies ordered. Urine studies demonstrate pre-renal but  labs did not improve with fluids. On exam, patient appears volume overloaded still, will diurese again today.      Interval History: no acute events overnight. Patient's renal function did not improve with fluids, may be a component of cardiorenal as his urine studies demonstrated pre-renal etiology. Furthermore, lasix trial today met with improved UOP. Patient reports improvement in breathing, no rales appreciated on exam    Review of Systems   Constitutional:  Positive for appetite change (decrease). Negative for chills and fever.   HENT:  Negative for congestion and sore throat.    Eyes:  Negative for photophobia and visual disturbance.   Respiratory:  Negative for cough, chest tightness and shortness of breath.    Cardiovascular:  Positive for leg swelling. Negative for chest pain and palpitations.   Gastrointestinal:  Positive for abdominal distention. Negative for abdominal pain, blood in stool, constipation, diarrhea, nausea and vomiting.   Endocrine: Negative for cold intolerance and heat intolerance.   Genitourinary:  Negative for dysuria and hematuria.   Musculoskeletal:  Negative for arthralgias and myalgias.   Skin:  Negative for rash and wound.   Allergic/Immunologic: Negative for environmental allergies and food allergies.   Neurological:  Negative for seizures and numbness.   Hematological:  Negative for adenopathy. Does not bruise/bleed easily.   Psychiatric/Behavioral:  Negative for hallucinations and suicidal ideas.    Objective:     Vital Signs (Most Recent):  Temp: 97.6 °F (36.4 °C) (05/17/23 1111)  Pulse: 73 (05/17/23 1111)  Resp: 18 (05/17/23 1111)  BP: 104/65 (05/17/23 1111)  SpO2: (!) 94 % (05/17/23 1111) Vital Signs (24h Range):  Temp:  [97.5 °F (36.4 °C)-97.7 °F (36.5 °C)] 97.6 °F (36.4 °C)  Pulse:  [66-73] 73  Resp:  [16-20] 18  SpO2:  [94 %-96 %] 94 %  BP: (104-149)/(64-83) 104/65     Weight: 121.3 kg (267 lb 6.7 oz)  Body mass index is 35.28 kg/m².    Intake/Output Summary (Last 24  hours) at 5/17/2023 1334  Last data filed at 5/17/2023 1113  Gross per 24 hour   Intake 1884 ml   Output 3770 ml   Net -1886 ml           Physical Exam  Constitutional:       Appearance: He is well-developed.   HENT:      Head: Normocephalic and atraumatic.   Eyes:      Conjunctiva/sclera: Conjunctivae normal.      Pupils: Pupils are equal, round, and reactive to light.   Neck:      Thyroid: No thyromegaly.      Vascular: No JVD.   Cardiovascular:      Rate and Rhythm: Normal rate and regular rhythm.      Heart sounds: Normal heart sounds. No murmur heard.    No friction rub. No gallop.   Pulmonary:      Effort: Pulmonary effort is normal. No respiratory distress.      Breath sounds: No wheezing or rales.   Abdominal:      General: Bowel sounds are normal. There is no distension.      Palpations: Abdomen is soft. There is no mass.      Tenderness: There is no abdominal tenderness. There is no guarding or rebound.      Hernia: No hernia is present.      Comments: Hernia     Musculoskeletal:         General: No deformity. Normal range of motion.      Cervical back: Normal range of motion and neck supple.      Right lower leg: Edema present.      Left lower leg: Edema present.   Skin:     General: Skin is warm and dry.   Neurological:      Mental Status: He is alert and oriented to person, place, and time.      Cranial Nerves: No cranial nerve deficit.   Psychiatric:         Behavior: Behavior normal.           Significant Labs: All pertinent labs within the past 24 hours have been reviewed.  CBC:   No results for input(s): WBC, HGB, HCT, PLT in the last 48 hours.    CMP:   Recent Labs   Lab 05/16/23  0531 05/16/23  1335 05/17/23  0450    139 139   K 4.3 4.5 4.7    101 103   CO2 26 28 26   * 95 99   BUN 32* 34* 34*   CREATININE 1.6* 1.6* 1.5*   CALCIUM 9.0 9.2 9.2   PROT 6.8  --  6.6   ALBUMIN 3.5 3.8 3.4*   BILITOT 0.3  --  0.3   ALKPHOS 63  --  57   AST 19  --  22   ALT 19  --  21   ANIONGAP 11 10  10         Significant Imaging: I have reviewed all pertinent imaging results/findings within the past 24 hours.      Assessment/Plan:      * Acute congestive heart failure  Patient is identified as having Unknown presumed DIASTOLIC CHF - ECHO IS PENDING heart failure that is Acute. CHF is currently uncontrolled due to Continued edema of extremities and Pulmonary edema/pleural effusion on CXR.  Continue Beta Blocker, ACE/ARB, Furosemide and Nitrate/Vasodilator and monitor clinical status closely. Monitor on telemetry. Patient is on CHF pathway.  Monitor strict Is&Os and daily weights.  Place on fluid restriction of 1.5 L. Continue to stress to patient importance of self efficacy and  on diet for CHF. Last BNP reviewed- and noted below   Recent Labs   Lab 05/12/23  1802   BNP 46   - Keep on Tele monitoring  - TTE performed on 5/15, EF 60% with CVP of around 8, patient appears to have hfpef  - Trial lasix 80mg IV x1 today, repeat RFP this afternoon, possilby additional diuresis.  - Increase hydralazine to 50mg TID and continue ISDN 20mg TID  - Continue metoprolol  - Continue Lisinopril 40mg po daily   - pricecheck sglt2 on discharge      OSMANY (acute kidney injury)  Patient with acute kidney injury likely due to pre-renal azotemia OSMANY is currently worsening. Labs reviewed- Renal function/electrolytes with Estimated Creatinine Clearance: 67 mL/min (A) (based on SCr of 1.5 mg/dL (H)). according to latest data. Monitor urine output and serial BMP and adjust therapy as needed. Avoid nephrotoxins and renally dose meds for GFR listed above.   - urine studies indicate pre-renal despite fluids, indicating maybe cardiorenal as cause  - restarted diuresis on 5/17, repeat RFP this afternoon       Hypomagnesemia  Replete magnesium prn      Diabetic polyneuropathy associated with type 2 diabetes mellitus  Bilateral neuropathy at feet - continue gabapentin  Also has chronic LUE pain, left hand numbness - has had MRI cervical  spine this year with some neuro-foraminal stenosis      PAD (peripheral artery disease)  Continue aspirin/statin      CKD (chronic kidney disease), stage III  Monitor daily renal function while on diuretics      S/P liver transplant  Continue tacrolimus 0.5mg BID, decreased in setting of OSMANY  - check tacrolimus level in AM  - Hepatology following    Type 2 diabetes mellitus with diabetic polyneuropathy, with long-term current use of insulin  Patient's FSGs are controlled on current medication regimen.  Last A1c reviewed-   Lab Results   Component Value Date    HGBA1C 6.8 (H) 05/13/2023     Most recent fingerstick glucose reviewed-   Recent Labs   Lab 05/13/23  1543 05/13/23  2123 05/14/23  0816   POCTGLUCOSE 118* 178* 181*     Current correctional scale  Medium  Maintain anti-hyperglycemic dose as follows-   Antihyperglycemics (From admission, onward)    Start     Stop Route Frequency Ordered    05/13/23 0900  insulin detemir U-100 (Levemir) pen 20 Units         -- SubQ Daily 05/13/23 0712    05/13/23 0715  insulin aspart U-100 pen 9 Units         -- SubQ 3 times daily with meals 05/13/23 0712    05/13/23 0057  insulin aspart U-100 pen 1-10 Units         -- SubQ Before meals & nightly PRN 05/12/23 2358        Hold Oral hypoglycemics while patient is in the hospital.      Essential hypertension  As per primary problem      Gout, unspecified  Chronic/stable  Continue allopurinol bid      Acquired hypothyroidism  Normal TSH/T4  - Continue home levothyroxine        VTE Risk Mitigation (From admission, onward)         Ordered     enoxaparin injection 40 mg  Daily         05/12/23 2357     IP VTE HIGH RISK PATIENT  Once         05/12/23 2357     Place sequential compression device  Until discontinued         05/12/23 2357                Discharge Planning   JOJO: 5/18/2023     Code Status: Full Code   Is the patient medically ready for discharge?: No    Reason for patient still in hospital (select all that apply): Patient  trending condition  Discharge Plan A: Home with family                  Sailaja Ashley MD  Department of Hospital Medicine   [facility] - Cardiology Stepdown

## 2023-05-17 NOTE — ASSESSMENT & PLAN NOTE
Patient with acute kidney injury likely due to pre-renal azotemia OSMANY is currently worsening. Labs reviewed- Renal function/electrolytes with Estimated Creatinine Clearance: 67 mL/min (A) (based on SCr of 1.5 mg/dL (H)). according to latest data. Monitor urine output and serial BMP and adjust therapy as needed. Avoid nephrotoxins and renally dose meds for GFR listed above.   - urine studies indicate pre-renal despite fluids, indicating maybe cardiorenal as cause  - restarted diuresis on 5/17, repeat RFP this afternoon

## 2023-05-17 NOTE — ASSESSMENT & PLAN NOTE
Continue tacrolimus 0.5mg BID, decreased in setting of OSMANY  - check tacrolimus level in AM  - Hepatology following

## 2023-05-18 LAB
ALBUMIN SERPL BCP-MCNC: 4 G/DL (ref 3.5–5.2)
ALP SERPL-CCNC: 58 U/L (ref 55–135)
ALT SERPL W/O P-5'-P-CCNC: 21 U/L (ref 10–44)
ANION GAP SERPL CALC-SCNC: 11 MMOL/L (ref 8–16)
AST SERPL-CCNC: 23 U/L (ref 10–40)
BILIRUB SERPL-MCNC: 0.2 MG/DL (ref 0.1–1)
BUN SERPL-MCNC: 45 MG/DL (ref 8–23)
CALCIUM SERPL-MCNC: 9.5 MG/DL (ref 8.7–10.5)
CHLORIDE SERPL-SCNC: 102 MMOL/L (ref 95–110)
CO2 SERPL-SCNC: 27 MMOL/L (ref 23–29)
CREAT SERPL-MCNC: 1.9 MG/DL (ref 0.5–1.4)
EST. GFR  (NO RACE VARIABLE): 38.7 ML/MIN/1.73 M^2
GLUCOSE SERPL-MCNC: 88 MG/DL (ref 70–110)
MAGNESIUM SERPL-MCNC: 1.9 MG/DL (ref 1.6–2.6)
POCT GLUCOSE: 105 MG/DL (ref 70–110)
POCT GLUCOSE: 134 MG/DL (ref 70–110)
POCT GLUCOSE: 135 MG/DL (ref 70–110)
POCT GLUCOSE: 176 MG/DL (ref 70–110)
POTASSIUM SERPL-SCNC: 4.7 MMOL/L (ref 3.5–5.1)
PROT SERPL-MCNC: 6.9 G/DL (ref 6–8.4)
SODIUM SERPL-SCNC: 140 MMOL/L (ref 136–145)
TACROLIMUS BLD-MCNC: 3.5 NG/ML (ref 5–15)

## 2023-05-18 PROCEDURE — 25000003 PHARM REV CODE 250: Performed by: HOSPITALIST

## 2023-05-18 PROCEDURE — 36415 COLL VENOUS BLD VENIPUNCTURE: CPT | Performed by: STUDENT IN AN ORGANIZED HEALTH CARE EDUCATION/TRAINING PROGRAM

## 2023-05-18 PROCEDURE — 80053 COMPREHEN METABOLIC PANEL: CPT | Performed by: STUDENT IN AN ORGANIZED HEALTH CARE EDUCATION/TRAINING PROGRAM

## 2023-05-18 PROCEDURE — 83735 ASSAY OF MAGNESIUM: CPT | Performed by: HOSPITALIST

## 2023-05-18 PROCEDURE — 63600175 PHARM REV CODE 636 W HCPCS: Performed by: STUDENT IN AN ORGANIZED HEALTH CARE EDUCATION/TRAINING PROGRAM

## 2023-05-18 PROCEDURE — 63600175 PHARM REV CODE 636 W HCPCS: Performed by: HOSPITALIST

## 2023-05-18 PROCEDURE — 20600001 HC STEP DOWN PRIVATE ROOM

## 2023-05-18 PROCEDURE — 25000003 PHARM REV CODE 250: Performed by: STUDENT IN AN ORGANIZED HEALTH CARE EDUCATION/TRAINING PROGRAM

## 2023-05-18 PROCEDURE — 80197 ASSAY OF TACROLIMUS: CPT | Performed by: STUDENT IN AN ORGANIZED HEALTH CARE EDUCATION/TRAINING PROGRAM

## 2023-05-18 RX ORDER — FUROSEMIDE 10 MG/ML
80 INJECTION INTRAMUSCULAR; INTRAVENOUS ONCE
Status: COMPLETED | OUTPATIENT
Start: 2023-05-18 | End: 2023-05-18

## 2023-05-18 RX ADMIN — HYDRALAZINE HYDROCHLORIDE 50 MG: 50 TABLET ORAL at 08:05

## 2023-05-18 RX ADMIN — TACROLIMUS 0.5 MG: 0.5 CAPSULE ORAL at 08:05

## 2023-05-18 RX ADMIN — INSULIN ASPART 9 UNITS: 100 INJECTION, SOLUTION INTRAVENOUS; SUBCUTANEOUS at 08:05

## 2023-05-18 RX ADMIN — INSULIN ASPART 2 UNITS: 100 INJECTION, SOLUTION INTRAVENOUS; SUBCUTANEOUS at 08:05

## 2023-05-18 RX ADMIN — OXYCODONE AND ACETAMINOPHEN 1 TABLET: 10; 325 TABLET ORAL at 05:05

## 2023-05-18 RX ADMIN — Medication 1 TABLET: at 08:05

## 2023-05-18 RX ADMIN — ISOSORBIDE DINITRATE 20 MG: 20 TABLET ORAL at 03:05

## 2023-05-18 RX ADMIN — LEVOTHYROXINE SODIUM 88 MCG: 88 TABLET ORAL at 06:05

## 2023-05-18 RX ADMIN — OXYCODONE HYDROCHLORIDE AND ACETAMINOPHEN 1 TABLET: 5; 325 TABLET ORAL at 05:05

## 2023-05-18 RX ADMIN — INSULIN ASPART 9 UNITS: 100 INJECTION, SOLUTION INTRAVENOUS; SUBCUTANEOUS at 05:05

## 2023-05-18 RX ADMIN — ASPIRIN 81 MG CHEWABLE TABLET 81 MG: 81 TABLET CHEWABLE at 08:05

## 2023-05-18 RX ADMIN — GABAPENTIN 600 MG: 400 CAPSULE ORAL at 08:05

## 2023-05-18 RX ADMIN — ALLOPURINOL 100 MG: 300 TABLET ORAL at 08:05

## 2023-05-18 RX ADMIN — ATORVASTATIN CALCIUM 20 MG: 20 TABLET, FILM COATED ORAL at 08:05

## 2023-05-18 RX ADMIN — HYDRALAZINE HYDROCHLORIDE 50 MG: 50 TABLET ORAL at 03:05

## 2023-05-18 RX ADMIN — THERA TABS 1 TABLET: TAB at 08:05

## 2023-05-18 RX ADMIN — OXYCODONE AND ACETAMINOPHEN 1 TABLET: 10; 325 TABLET ORAL at 09:05

## 2023-05-18 RX ADMIN — TRAZODONE HYDROCHLORIDE 50 MG: 50 TABLET ORAL at 08:05

## 2023-05-18 RX ADMIN — INSULIN ASPART 9 UNITS: 100 INJECTION, SOLUTION INTRAVENOUS; SUBCUTANEOUS at 12:05

## 2023-05-18 RX ADMIN — SERTRALINE 100 MG: 100 TABLET, FILM COATED ORAL at 09:05

## 2023-05-18 RX ADMIN — ENOXAPARIN SODIUM 40 MG: 40 INJECTION SUBCUTANEOUS at 05:05

## 2023-05-18 RX ADMIN — TAMSULOSIN HYDROCHLORIDE 0.4 MG: 0.4 CAPSULE ORAL at 08:05

## 2023-05-18 RX ADMIN — FUROSEMIDE 80 MG: 10 INJECTION, SOLUTION INTRAMUSCULAR; INTRAVENOUS at 03:05

## 2023-05-18 RX ADMIN — INSULIN DETEMIR 20 UNITS: 100 INJECTION, SOLUTION SUBCUTANEOUS at 08:05

## 2023-05-18 RX ADMIN — ISOSORBIDE DINITRATE 20 MG: 20 TABLET ORAL at 08:05

## 2023-05-18 RX ADMIN — GABAPENTIN 600 MG: 400 CAPSULE ORAL at 03:05

## 2023-05-18 RX ADMIN — OXYCODONE AND ACETAMINOPHEN 1 TABLET: 10; 325 TABLET ORAL at 10:05

## 2023-05-18 RX ADMIN — LISINOPRIL 40 MG: 20 TABLET ORAL at 08:05

## 2023-05-18 RX ADMIN — OXYCODONE AND ACETAMINOPHEN 1 TABLET: 10; 325 TABLET ORAL at 06:05

## 2023-05-18 RX ADMIN — NIFEDIPINE 90 MG: 60 TABLET, FILM COATED, EXTENDED RELEASE ORAL at 08:05

## 2023-05-18 RX ADMIN — METOPROLOL SUCCINATE 200 MG: 100 TABLET, EXTENDED RELEASE ORAL at 08:05

## 2023-05-18 RX ADMIN — OXYCODONE HYDROCHLORIDE AND ACETAMINOPHEN 1 TABLET: 5; 325 TABLET ORAL at 10:05

## 2023-05-18 NOTE — ASSESSMENT & PLAN NOTE
Patient is identified as having Unknown presumed DIASTOLIC CHF - ECHO IS PENDING heart failure that is Acute. CHF is currently uncontrolled due to Continued edema of extremities and Pulmonary edema/pleural effusion on CXR.  Continue Beta Blocker, ACE/ARB, Furosemide and Nitrate/Vasodilator and monitor clinical status closely. Monitor on telemetry. Patient is on CHF pathway.  Monitor strict Is&Os and daily weights.  Place on fluid restriction of 1.5 L. Continue to stress to patient importance of self efficacy and  on diet for CHF. Last BNP reviewed- and noted below   Recent Labs   Lab 05/12/23  1802   BNP 46   - Keep on Tele monitoring  - TTE performed on 5/15, EF 60% with CVP of around 8  - Trial lasix 80mg IV x1 today  - Increase hydralazine to 50mg TID and continue ISDN 20mg TID  - Continue metoprolol  - Continue Lisinopril 40mg po daily   - pricecheck sglt2 on discharge

## 2023-05-18 NOTE — TREATMENT PLAN
[facility]  Hepatology  Treatment Plan    Patient Name: Vick Gonzalez  MRN: [id number]  Admission Date: 5/12/2023  Hospital Length of Stay: 5 days    Subjective:     Interval History:  NAEON.    No current facility-administered medications on file prior to encounter.     Current Outpatient Medications on File Prior to Encounter   Medication Sig Dispense Refill    albuterol (VENTOLIN HFA) 90 mcg/actuation inhaler Inhale 2 puffs into the lungs every 6 (six) hours as needed for Wheezing or Shortness of Breath. Rescue 8.5 g 1    aluminum-magnesium hydroxide-simethicone (MAALOX) 200-200-20 mg/5 mL Susp Take 30 mLs by mouth 4 (four) times daily before meals and nightly. (Patient taking differently: Take 30 mLs by mouth every 6 (six) hours as needed.) 769 mL 0    aspirin 81 MG Chew Take 1 tablet (81 mg total) by mouth once daily. 90 tablet 3    calcium carbonate-vitamin D3 (CALCIUM 600 WITH VITAMIN D3) 600 mg(1,500mg) -400 unit Chew Take 1 tablet by mouth once daily.       clotrimazole (LOTRIMIN) 1 % cream APPLY TOPICALLY TWICE A DAY 30 g 1    CYANOCOBALAMIN, VITAMIN B-12, ORAL Chew 1 tablet by mouth 2 to 3 times a month.      diphenhydrAMINE (BENADRYL) 25 mg capsule Take 25 mg by mouth daily as needed for Allergies (Cold).      gabapentin (NEURONTIN) 800 MG tablet Take 1 tablet (800 mg total) by mouth 3 (three) times daily. 90 tablet 11    ibuprofen (ADVIL,MOTRIN) 200 MG tablet Take 400 mg by mouth daily as needed for Pain.      insulin glargine U-300 conc (TOUJEO MAX U-300 SOLOSTAR) 300 unit/mL (3 mL) insulin pen Inject 40 Units into the skin once daily. 3 mL 11    insulin lispro (HUMALOG KWIKPEN INSULIN) 100 unit/mL pen Inject 20 Units into the skin 3 (three) times daily with meals. 15 mL 11    levothyroxine (SYNTHROID) 88 MCG tablet TAKE 1 TABLET BY MOUTH EVERY DAY 90 tablet 1    LIDOcaine 4 % PtMd Apply topically to lower back once daily as needed for pain.      lisinopriL (PRINIVIL,ZESTRIL) 40 MG  "tablet TAKE 1 TABLET BY MOUTH EVERY DAY 90 tablet 3    metoprolol succinate (TOPROL-XL) 200 MG 24 hr tablet TAKE 1 TABLET BY MOUTH EVERY DAY 90 tablet 3    multivitamin (THERAGRAN) per tablet Take 1 tablet by mouth once daily.       NIFEdipine (PROCARDIA-XL) 90 MG (OSM) 24 hr tablet Take 1 tablet (90 mg total) by mouth once daily. 90 tablet 3    ondansetron (ZOFRAN-ODT) 4 MG TbDL Take 1 tablet (4 mg total) by mouth every 6 (six) hours as needed (Nausea). 15 tablet 0    tacrolimus (PROGRAF) 1 MG Cap Take 2 capsules (2 mg total) by mouth every morning AND 1 capsule (1 mg total) every evening. 90 capsule 11    traZODone (DESYREL) 50 MG tablet TAKE 1 TABLET BY MOUTH EVERY EVENING. 90 tablet 3    allopurinoL (ZYLOPRIM) 100 MG tablet TAKE 1 TABLET BY MOUTH TWICE A  tablet 3    blood sugar diagnostic (TRUE METRIX GLUCOSE TEST STRIP) Strp USE 3 TIMES DAILY TO TEST BLOOD GLUCOSE LEVEL 100 strip 11    blood-glucose meter Misc 1 Device by Misc.(Non-Drug; Combo Route) route 3 (three) times daily. 1 each 0    blood-glucose sensor (DEXCOM G6 SENSOR) Viviane Use as directed 3 each 11    blood-glucose transmitter (DEXCOM G6 TRANSMITTER) Viviane Use as directed 1 each 11    flash glucose sensor (FREESTYLE PALLAVI 2 SENSOR) Kit One sensor every 14 days 2 kit prn    hydrALAZINE (APRESOLINE) 25 MG tablet Take 25 mg by mouth every 12 (twelve) hours.      lancets 30 gauge Misc 1 lancet by Misc.(Non-Drug; Combo Route) route 4 (four) times daily before meals and nightly. 200 each 11    NOVOFINE PLUS 32 gauge x 1/6" Ndle       rosuvastatin (CRESTOR) 5 MG tablet TAKE 1 TABLET BY MOUTH EVERY DAY 90 tablet 11    [DISCONTINUED] insulin aspart U-100 (NOVOLOG FLEXPEN U-100 INSULIN) 100 unit/mL (3 mL) InPn pen Inject 20 Units into the skin 3 (three) times daily with meals. 15 mL 11       Review of patient's allergies indicates:  No Known Allergies      Objective:     Vitals:    05/18/23 1152   BP: 118/70   Pulse: 65   Resp: 18   Temp: 97.9 °F (36.6 " °C)       Significant Labs:  Recent Labs   Lab 05/12/23 1802   HGB 11.7*       Lab Results   Component Value Date    WBC 7.99 05/12/2023    HGB 11.7 (L) 05/12/2023    HCT 38.2 (L) 05/12/2023    MCV 92 05/12/2023     (L) 05/12/2023       Lab Results   Component Value Date     05/18/2023    K 4.7 05/18/2023     05/18/2023    CO2 27 05/18/2023    BUN 45 (H) 05/18/2023    CREATININE 1.9 (H) 05/18/2023    CALCIUM 9.5 05/18/2023    ANIONGAP 11 05/18/2023    ESTGFRAFRICA >60.0 07/25/2022    EGFRNONAA 52.7 (A) 07/25/2022       Lab Results   Component Value Date    ALT 21 05/18/2023    AST 23 05/18/2023     (H) 04/24/2017    ALKPHOS 58 05/18/2023    BILITOT 0.2 05/18/2023       Lab Results   Component Value Date    INR 1.0 04/24/2023    INR 1.0 01/23/2023    INR 1.0 07/12/2022       Significant Imaging:  Reviewed pertinent radiology findings.       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 Please call with any additional questions, concerns or changes in the patient's clinical status. We will continue to follow.   Celestino Paredes MD  Gastroenterology Fellow PGY IV   [facility]

## 2023-05-18 NOTE — NURSING
Plan of care discussed with patient.  Patient remains free of falls and trauma. Patient ambulating independently, fall precautions in place. Pain controlled with prn meds. Discussed medications and care. Lasix 80mg IVP one time dose administered. BG monitored. Patient has no questions at this time. Will continue to monitor.

## 2023-05-18 NOTE — SUBJECTIVE & OBJECTIVE
Interval History: No acute events. Still overloaded but pt reports improvement from yesterday. Cr stable  Improvement in shortness of breath    Review of Systems  Objective:     Vital Signs (Most Recent):  Temp: 97.9 °F (36.6 °C) (05/18/23 1152)  Pulse: 65 (05/18/23 1152)  Resp: 18 (05/18/23 1152)  BP: 118/70 (05/18/23 1152)  SpO2: (!) 92 % (05/18/23 1152) Vital Signs (24h Range):  Temp:  [97.4 °F (36.3 °C)-97.9 °F (36.6 °C)] 97.9 °F (36.6 °C)  Pulse:  [64-76] 65  Resp:  [18-20] 18  SpO2:  [92 %-97 %] 92 %  BP: (110-128)/(52-81) 118/70     Weight: 119.6 kg (263 lb 10.7 oz)  Body mass index is 34.79 kg/m².    Intake/Output Summary (Last 24 hours) at 5/18/2023 1407  Last data filed at 5/18/2023 1218  Gross per 24 hour   Intake 682 ml   Output 4300 ml   Net -3618 ml         Physical Exam  Constitutional:       Appearance: He is well-developed.   Eyes:      Conjunctiva/sclera: Conjunctivae normal.      Pupils: Pupils are equal, round, and reactive to light.   Cardiovascular:      Rate and Rhythm: Normal rate and regular rhythm.      Heart sounds: Normal heart sounds. No murmur heard.    No friction rub. No gallop.   Pulmonary:      Effort: Pulmonary effort is normal. No respiratory distress.      Breath sounds: No wheezing or rales.   Abdominal:      General: Bowel sounds are normal. There is no distension.      Palpations: Abdomen is soft. There is no mass.      Tenderness: There is no abdominal tenderness. There is no guarding or rebound.      Hernia: No hernia is present.      Comments: Hernia     Musculoskeletal:         General: No deformity. Normal range of motion.      Right lower leg: Edema present.      Left lower leg: Edema present.   Skin:     General: Skin is warm and dry.   Neurological:      Mental Status: He is alert and oriented to person, place, and time.      Cranial Nerves: No cranial nerve deficit.           Significant Labs: All pertinent labs within the past 24 hours have been  reviewed.    Significant Imaging: I have reviewed all pertinent imaging results/findings within the past 24 hours.

## 2023-05-18 NOTE — PROGRESS NOTES
Steven Veliz - Cardiology [facility] Medicine  Progress Note    Patient Name: Vick Gonzalez  MRN: [id number]  Patient Class: IP- Inpatient   Admission Date: 5/12/2023  Length of Stay: 5 days  Attending Physician: Velasquez Whitaker*  Primary Care Provider: Emanuel Lopez MD        Subjective:     Principal Problem:Acute congestive heart failure        HPI:  67-year-old  man with a history of alcoholic liver disease status post liver transplant, HCV status post viral treatment, type 2 diabetes on insulin hypertension, gout, hypothyroidism and PAD presents the emergency department with complaints shortness of breath and peripheral edema.    He reports that over.  Week he has developed worsening progressive lower extremity edema and has noted intermittent dyspnea on exertion and having some bouts acute dyspnea that occurred her randomly at rest.  He denies chest pain at this time , no chest pressure.   No reported his of CAD or heart failure in the past.  He states prior to his liver transplant that hes experienced peripheral edema.   He reports adherence to home medications/antihypertensive regimen.  Blood pressure elevated on vitals today.  He also feels increased abdominal girth.     He is non-smoker, no recent alcohol use, no drug use.  He reports no increased fluid intake, but diet discussion states his son has been working at [employer] and bringing home fried catfish dinner plate frequently over recent weeks. Patient later eating a taco his wife brought him after my initial visit, has had increased sodium intake reported.     ED Treatment: Lasix 40mg IV x 1 - Lidocaine patch, oxycodone 5mg.           Overview/Hospital Course:  No issues since admission. Diuersing well. EF 60%, CVP around 8. Holding diuretics on 5/15 given concern of overdiuresis. Giving slight fluids on 5/16 due to worsening cr, urine studies ordered. Urine studies demonstrate pre-renal  but labs did not improve with fluids. On exam, patient appears volume overloaded still, will diurese again today.  5/18: no acute events overnight. Patient's renal function did not improve with fluids, may be a component of cardiorenal as his urine studies demonstrated pre-renal etiology. Furthermore, lasix trial today met with improved UOP. Patient reports improvement in breathing, no rales appreciated on exam. Given albumin       Interval History: No acute events. Still overloaded but pt reports improvement from yesterday. Cr stable. Robust response following diuresis yesterday  Improvement in shortness of breath    Review of Systems  Objective:     Vital Signs (Most Recent):  Temp: 97.9 °F (36.6 °C) (05/18/23 1152)  Pulse: 65 (05/18/23 1152)  Resp: 18 (05/18/23 1152)  BP: 118/70 (05/18/23 1152)  SpO2: (!) 92 % (05/18/23 1152) Vital Signs (24h Range):  Temp:  [97.4 °F (36.3 °C)-97.9 °F (36.6 °C)] 97.9 °F (36.6 °C)  Pulse:  [64-76] 65  Resp:  [18-20] 18  SpO2:  [92 %-97 %] 92 %  BP: (110-128)/(52-81) 118/70     Weight: 119.6 kg (263 lb 10.7 oz)  Body mass index is 34.79 kg/m².    Intake/Output Summary (Last 24 hours) at 5/18/2023 1407  Last data filed at 5/18/2023 1218  Gross per 24 hour   Intake 682 ml   Output 4300 ml   Net -3618 ml         Physical Exam  Constitutional:       Appearance: He is well-developed.   Eyes:      Conjunctiva/sclera: Conjunctivae normal.      Pupils: Pupils are equal, round, and reactive to light.   Cardiovascular:      Rate and Rhythm: Normal rate and regular rhythm.      Heart sounds: Normal heart sounds. No murmur heard.    No friction rub. No gallop.   Pulmonary:      Effort: Pulmonary effort is normal. No respiratory distress.      Breath sounds: No wheezing or rales.   Abdominal:      General: Bowel sounds are normal. There is no distension.      Palpations: Abdomen is soft. There is no mass.      Tenderness: There is no abdominal tenderness. There is no guarding or rebound.       Hernia: No hernia is present.      Comments: Hernia     Musculoskeletal:         General: No deformity. Normal range of motion.      Right lower leg: Edema present.      Left lower leg: Edema present.   Skin:     General: Skin is warm and dry.   Neurological:      Mental Status: He is alert and oriented to person, place, and time.      Cranial Nerves: No cranial nerve deficit.           Significant Labs: All pertinent labs within the past 24 hours have been reviewed.    Significant Imaging: I have reviewed all pertinent imaging results/findings within the past 24 hours.      Assessment/Plan:      * Acute congestive heart failure  Patient is identified as having Unknown presumed DIASTOLIC CHF - ECHO IS PENDING heart failure that is Acute. CHF is currently uncontrolled due to Continued edema of extremities and Pulmonary edema/pleural effusion on CXR.  Continue Beta Blocker, ACE/ARB, Furosemide and Nitrate/Vasodilator and monitor clinical status closely. Monitor on telemetry. Patient is on CHF pathway.  Monitor strict Is&Os and daily weights.  Place on fluid restriction of 1.5 L. Continue to stress to patient importance of self efficacy and  on diet for CHF. Last BNP reviewed- and noted below   Recent Labs   Lab 05/12/23  1802   BNP 46   - Keep on Tele monitoring  - TTE performed on 5/15, EF 60% with CVP of around 8, patient appears to have hfpef  - Trial lasix 80mg IV x1 today, repeat RFP this afternoon, possilby additional diuresis.  - Increase hydralazine to 50mg TID and continue ISDN 20mg TID  - Continue metoprolol  - Continue Lisinopril 40mg po daily   - pricecheck sglt2 on discharge      Hypomagnesemia  Replete magnesium prn      Diabetic polyneuropathy associated with type 2 diabetes mellitus  Bilateral neuropathy at feet - continue gabapentin  Also has chronic LUE pain, left hand numbness - has had MRI cervical spine this year with some neuro-foraminal stenosis      PAD (peripheral artery  disease)  Continue aspirin/statin      CKD (chronic kidney disease), stage III  Monitor daily renal function while on diuretics      OSMANY (acute kidney injury)  Patient with acute kidney injury likely due to pre-renal azotemia OSMANY is currently worsening. Labs reviewed- Renal function/electrolytes with Estimated Creatinine Clearance: 67 mL/min (A) (based on SCr of 1.5 mg/dL (H)). according to latest data. Monitor urine output and serial BMP and adjust therapy as needed. Avoid nephrotoxins and renally dose meds for GFR listed above.   - urine studies indicate pre-renal despite fluids, indicating maybe cardiorenal as cause  - restarted diuresis on 5/17, repeat RFP this afternoon       S/P liver transplant  Continue tacrolimus 0.5mg BID, decreased in setting of OSMANY  - check tacrolimus level in AM  - Hepatology following    Type 2 diabetes mellitus with diabetic polyneuropathy, with long-term current use of insulin  Patient's FSGs are controlled on current medication regimen.  Last A1c reviewed-   Lab Results   Component Value Date    HGBA1C 6.8 (H) 05/13/2023     Most recent fingerstick glucose reviewed-   Recent Labs   Lab 05/13/23  1543 05/13/23  2123 05/14/23  0816   POCTGLUCOSE 118* 178* 181*     Current correctional scale  Medium  Maintain anti-hyperglycemic dose as follows-   Antihyperglycemics (From admission, onward)    Start     Stop Route Frequency Ordered    05/13/23 0900  insulin detemir U-100 (Levemir) pen 20 Units         -- SubQ Daily 05/13/23 0712    05/13/23 0715  insulin aspart U-100 pen 9 Units         -- SubQ 3 times daily with meals 05/13/23 0712    05/13/23 0057  insulin aspart U-100 pen 1-10 Units         -- SubQ Before meals & nightly PRN 05/12/23 6768        Hold Oral hypoglycemics while patient is in the hospital.      Essential hypertension  As per primary problem      Gout, unspecified  Chronic/stable  Continue allopurinol bid      Acquired hypothyroidism  Normal TSH/T4  - Continue home  levothyroxine        VTE Risk Mitigation (From admission, onward)         Ordered     enoxaparin injection 40 mg  Daily         05/12/23 2357     IP VTE HIGH RISK PATIENT  Once         05/12/23 2357     Place sequential compression device  Until discontinued         05/12/23 2357                Discharge Planning   JOJO: 5/18/2023     Code Status: Full Code   Is the patient medically ready for discharge?: No    Reason for patient still in hospital (select all that apply): Patient trending condition and Treatment  Discharge Plan A: Home with family        Velasquez Navarrete MD  Department of Hospital Medicine   [facility] - Cardiology Stepdown

## 2023-05-19 LAB
ALBUMIN SERPL BCP-MCNC: 3.9 G/DL (ref 3.5–5.2)
ALP SERPL-CCNC: 70 U/L (ref 55–135)
ALT SERPL W/O P-5'-P-CCNC: 24 U/L (ref 10–44)
ANION GAP SERPL CALC-SCNC: 9 MMOL/L (ref 8–16)
AST SERPL-CCNC: 23 U/L (ref 10–40)
BILIRUB SERPL-MCNC: 0.3 MG/DL (ref 0.1–1)
BUN SERPL-MCNC: 48 MG/DL (ref 8–23)
CALCIUM SERPL-MCNC: 9.1 MG/DL (ref 8.7–10.5)
CHLORIDE SERPL-SCNC: 101 MMOL/L (ref 95–110)
CO2 SERPL-SCNC: 27 MMOL/L (ref 23–29)
CREAT SERPL-MCNC: 1.9 MG/DL (ref 0.5–1.4)
EST. GFR  (NO RACE VARIABLE): 38.7 ML/MIN/1.73 M^2
GLUCOSE SERPL-MCNC: 150 MG/DL (ref 70–110)
MAGNESIUM SERPL-MCNC: 2.1 MG/DL (ref 1.6–2.6)
POCT GLUCOSE: 128 MG/DL (ref 70–110)
POCT GLUCOSE: 161 MG/DL (ref 70–110)
POCT GLUCOSE: 178 MG/DL (ref 70–110)
POTASSIUM SERPL-SCNC: 4.9 MMOL/L (ref 3.5–5.1)
PROT SERPL-MCNC: 7.1 G/DL (ref 6–8.4)
SODIUM SERPL-SCNC: 137 MMOL/L (ref 136–145)
TACROLIMUS BLD-MCNC: 2.6 NG/ML (ref 5–15)

## 2023-05-19 PROCEDURE — 80053 COMPREHEN METABOLIC PANEL: CPT | Performed by: STUDENT IN AN ORGANIZED HEALTH CARE EDUCATION/TRAINING PROGRAM

## 2023-05-19 PROCEDURE — 94761 N-INVAS EAR/PLS OXIMETRY MLT: CPT

## 2023-05-19 PROCEDURE — 99232 SBSQ HOSP IP/OBS MODERATE 35: CPT | Mod: ,,, | Performed by: STUDENT IN AN ORGANIZED HEALTH CARE EDUCATION/TRAINING PROGRAM

## 2023-05-19 PROCEDURE — 63600175 PHARM REV CODE 636 W HCPCS: Performed by: STUDENT IN AN ORGANIZED HEALTH CARE EDUCATION/TRAINING PROGRAM

## 2023-05-19 PROCEDURE — 25000003 PHARM REV CODE 250: Performed by: STUDENT IN AN ORGANIZED HEALTH CARE EDUCATION/TRAINING PROGRAM

## 2023-05-19 PROCEDURE — 20600001 HC STEP DOWN PRIVATE ROOM

## 2023-05-19 PROCEDURE — 80197 ASSAY OF TACROLIMUS: CPT | Performed by: STUDENT IN AN ORGANIZED HEALTH CARE EDUCATION/TRAINING PROGRAM

## 2023-05-19 PROCEDURE — 36415 COLL VENOUS BLD VENIPUNCTURE: CPT | Performed by: HOSPITALIST

## 2023-05-19 PROCEDURE — 25000003 PHARM REV CODE 250: Performed by: HOSPITALIST

## 2023-05-19 PROCEDURE — 83735 ASSAY OF MAGNESIUM: CPT | Performed by: HOSPITALIST

## 2023-05-19 PROCEDURE — 99232 PR SUBSEQUENT HOSPITAL CARE,LEVL II: ICD-10-PCS | Mod: ,,, | Performed by: STUDENT IN AN ORGANIZED HEALTH CARE EDUCATION/TRAINING PROGRAM

## 2023-05-19 PROCEDURE — 63600175 PHARM REV CODE 636 W HCPCS: Performed by: HOSPITALIST

## 2023-05-19 RX ORDER — FUROSEMIDE 10 MG/ML
80 INJECTION INTRAMUSCULAR; INTRAVENOUS ONCE
Status: COMPLETED | OUTPATIENT
Start: 2023-05-19 | End: 2023-05-19

## 2023-05-19 RX ADMIN — Medication 1 TABLET: at 10:05

## 2023-05-19 RX ADMIN — FUROSEMIDE 80 MG: 10 INJECTION, SOLUTION INTRAMUSCULAR; INTRAVENOUS at 12:05

## 2023-05-19 RX ADMIN — TAMSULOSIN HYDROCHLORIDE 0.4 MG: 0.4 CAPSULE ORAL at 08:05

## 2023-05-19 RX ADMIN — ISOSORBIDE DINITRATE 20 MG: 20 TABLET ORAL at 10:05

## 2023-05-19 RX ADMIN — INSULIN ASPART 2 UNITS: 100 INJECTION, SOLUTION INTRAVENOUS; SUBCUTANEOUS at 08:05

## 2023-05-19 RX ADMIN — SERTRALINE 100 MG: 100 TABLET, FILM COATED ORAL at 08:05

## 2023-05-19 RX ADMIN — HYDRALAZINE HYDROCHLORIDE 50 MG: 50 TABLET ORAL at 10:05

## 2023-05-19 RX ADMIN — INSULIN ASPART 9 UNITS: 100 INJECTION, SOLUTION INTRAVENOUS; SUBCUTANEOUS at 08:05

## 2023-05-19 RX ADMIN — HYDRALAZINE HYDROCHLORIDE 50 MG: 50 TABLET ORAL at 04:05

## 2023-05-19 RX ADMIN — OXYCODONE AND ACETAMINOPHEN 1 TABLET: 10; 325 TABLET ORAL at 07:05

## 2023-05-19 RX ADMIN — ISOSORBIDE DINITRATE 20 MG: 20 TABLET ORAL at 04:05

## 2023-05-19 RX ADMIN — ALLOPURINOL 100 MG: 300 TABLET ORAL at 08:05

## 2023-05-19 RX ADMIN — INSULIN ASPART 9 UNITS: 100 INJECTION, SOLUTION INTRAVENOUS; SUBCUTANEOUS at 12:05

## 2023-05-19 RX ADMIN — OXYCODONE HYDROCHLORIDE AND ACETAMINOPHEN 1 TABLET: 5; 325 TABLET ORAL at 07:05

## 2023-05-19 RX ADMIN — LISINOPRIL 40 MG: 20 TABLET ORAL at 08:05

## 2023-05-19 RX ADMIN — GABAPENTIN 600 MG: 400 CAPSULE ORAL at 08:05

## 2023-05-19 RX ADMIN — ISOSORBIDE DINITRATE 20 MG: 20 TABLET ORAL at 08:05

## 2023-05-19 RX ADMIN — THERA TABS 1 TABLET: TAB at 08:05

## 2023-05-19 RX ADMIN — TACROLIMUS 0.5 MG: 0.5 CAPSULE ORAL at 12:05

## 2023-05-19 RX ADMIN — ENOXAPARIN SODIUM 40 MG: 40 INJECTION SUBCUTANEOUS at 04:05

## 2023-05-19 RX ADMIN — Medication 1 TABLET: at 08:05

## 2023-05-19 RX ADMIN — OXYCODONE AND ACETAMINOPHEN 1 TABLET: 10; 325 TABLET ORAL at 10:05

## 2023-05-19 RX ADMIN — INSULIN DETEMIR 20 UNITS: 100 INJECTION, SOLUTION SUBCUTANEOUS at 08:05

## 2023-05-19 RX ADMIN — OXYCODONE AND ACETAMINOPHEN 1 TABLET: 10; 325 TABLET ORAL at 12:05

## 2023-05-19 RX ADMIN — ALLOPURINOL 100 MG: 300 TABLET ORAL at 10:05

## 2023-05-19 RX ADMIN — ATORVASTATIN CALCIUM 20 MG: 20 TABLET, FILM COATED ORAL at 08:05

## 2023-05-19 RX ADMIN — LEVOTHYROXINE SODIUM 88 MCG: 88 TABLET ORAL at 06:05

## 2023-05-19 RX ADMIN — TACROLIMUS 0.5 MG: 0.5 CAPSULE ORAL at 05:05

## 2023-05-19 RX ADMIN — GABAPENTIN 600 MG: 400 CAPSULE ORAL at 04:05

## 2023-05-19 RX ADMIN — HYDRALAZINE HYDROCHLORIDE 50 MG: 50 TABLET ORAL at 08:05

## 2023-05-19 RX ADMIN — METOPROLOL SUCCINATE 200 MG: 100 TABLET, EXTENDED RELEASE ORAL at 08:05

## 2023-05-19 RX ADMIN — OXYCODONE HYDROCHLORIDE AND ACETAMINOPHEN 1 TABLET: 5; 325 TABLET ORAL at 12:05

## 2023-05-19 RX ADMIN — TRAZODONE HYDROCHLORIDE 50 MG: 50 TABLET ORAL at 10:05

## 2023-05-19 RX ADMIN — ASPIRIN 81 MG CHEWABLE TABLET 81 MG: 81 TABLET CHEWABLE at 08:05

## 2023-05-19 RX ADMIN — NIFEDIPINE 90 MG: 60 TABLET, FILM COATED, EXTENDED RELEASE ORAL at 08:05

## 2023-05-19 RX ADMIN — INSULIN ASPART 9 UNITS: 100 INJECTION, SOLUTION INTRAVENOUS; SUBCUTANEOUS at 05:05

## 2023-05-19 RX ADMIN — GABAPENTIN 600 MG: 400 CAPSULE ORAL at 10:05

## 2023-05-19 NOTE — SUBJECTIVE & OBJECTIVE
Interval History: Urine output 2.2 L, Cr stable  Some improvement lower extremity edema    Review of Systems  Objective:     Vital Signs (Most Recent):  Temp: 97.1 °F (36.2 °C) (05/19/23 1121)  Pulse: 70 (05/19/23 1200)  Resp: 17 (05/19/23 1231)  BP: 129/69 (05/19/23 1121)  SpO2: (!) 94 % (05/19/23 1121) Vital Signs (24h Range):  Temp:  [97.1 °F (36.2 °C)-97.8 °F (36.6 °C)] 97.1 °F (36.2 °C)  Pulse:  [63-78] 70  Resp:  [16-20] 17  SpO2:  [93 %-98 %] 94 %  BP: (107-140)/(61-85) 129/69     Weight: 120.1 kg (264 lb 12.4 oz)  Body mass index is 34.93 kg/m².    Intake/Output Summary (Last 24 hours) at 5/19/2023 1415  Last data filed at 5/19/2023 1156  Gross per 24 hour   Intake 480 ml   Output 1650 ml   Net -1170 ml         Physical Exam  Constitutional:       Appearance: He is well-developed.   Eyes:      Conjunctiva/sclera: Conjunctivae normal.      Pupils: Pupils are equal, round, and reactive to light.   Cardiovascular:      Rate and Rhythm: Normal rate and regular rhythm.      Heart sounds: Normal heart sounds. No murmur heard.    No friction rub. No gallop.   Pulmonary:      Effort: Pulmonary effort is normal. No respiratory distress.      Breath sounds: No wheezing or rales.   Abdominal:      General: Bowel sounds are normal. There is no distension.      Palpations: Abdomen is soft. There is no mass.      Tenderness: There is no abdominal tenderness. There is no guarding or rebound.      Hernia: No hernia is present.      Comments: Hernia     Musculoskeletal:         General: No deformity. Normal range of motion.      Right lower leg: Edema present.      Left lower leg: Edema present.   Skin:     General: Skin is warm and dry.   Neurological:      Mental Status: He is alert and oriented to person, place, and time.      Cranial Nerves: No cranial nerve deficit.           Significant Labs: All pertinent labs within the past 24 hours have been reviewed.    Significant Imaging: I have reviewed all pertinent imaging  results/findings within the past 24 hours.

## 2023-05-19 NOTE — PROGRESS NOTES
Steven Veliz - Cardiology [facility] Medicine  Progress Note    Patient Name: Vick Gonzalez  MRN: [id number]  Patient Class: IP- Inpatient   Admission Date: 5/12/2023  Length of Stay: 6 days  Attending Physician: Velasquez Whitaker*  Primary Care Provider: Emanuel Lopez MD        Subjective:     Principal Problem:Acute congestive heart failure        HPI:  67-year-old  man with a history of alcoholic liver disease status post liver transplant, HCV status post viral treatment, type 2 diabetes on insulin hypertension, gout, hypothyroidism and PAD presents the emergency department with complaints shortness of breath and peripheral edema.    He reports that over.  Week he has developed worsening progressive lower extremity edema and has noted intermittent dyspnea on exertion and having some bouts acute dyspnea that occurred her randomly at rest.  He denies chest pain at this time , no chest pressure.   No reported his of CAD or heart failure in the past.  He states prior to his liver transplant that hes experienced peripheral edema.   He reports adherence to home medications/antihypertensive regimen.  Blood pressure elevated on vitals today.  He also feels increased abdominal girth.     He is non-smoker, no recent alcohol use, no drug use.  He reports no increased fluid intake, but diet discussion states his son has been working at [employer] and bringing home fried catfish dinner plate frequently over recent weeks. Patient later eating a taco his wife brought him after my initial visit, has had increased sodium intake reported.     ED Treatment: Lasix 40mg IV x 1 - Lidocaine patch, oxycodone 5mg.           Overview/Hospital Course:  No issues since admission. Diuersing well. EF 60%, CVP around 8. Holding diuretics on 5/15 given concern of overdiuresis. Giving slight fluids on 5/16 due to worsening cr, urine studies ordered. Urine studies demonstrate pre-renal  but labs did not improve with fluids. On exam, patient appears volume overloaded still, will diurese again today.  5/18: no acute events overnight. Patient's renal function did not improve with fluids, may be a component of cardiorenal as his urine studies demonstrated pre-renal etiology. Furthermore, lasix trial today met with improved UOP. Patient reports improvement in breathing, no rales appreciated on exam. Given albumin       Interval History: Urine output 2.2 L, Cr stable  Some improvement lower extremity edema    Review of Systems  Objective:     Vital Signs (Most Recent):  Temp: 97.1 °F (36.2 °C) (05/19/23 1121)  Pulse: 70 (05/19/23 1200)  Resp: 17 (05/19/23 1231)  BP: 129/69 (05/19/23 1121)  SpO2: (!) 94 % (05/19/23 1121) Vital Signs (24h Range):  Temp:  [97.1 °F (36.2 °C)-97.8 °F (36.6 °C)] 97.1 °F (36.2 °C)  Pulse:  [63-78] 70  Resp:  [16-20] 17  SpO2:  [93 %-98 %] 94 %  BP: (107-140)/(61-85) 129/69     Weight: 120.1 kg (264 lb 12.4 oz)  Body mass index is 34.93 kg/m².    Intake/Output Summary (Last 24 hours) at 5/19/2023 1415  Last data filed at 5/19/2023 1156  Gross per 24 hour   Intake 480 ml   Output 1650 ml   Net -1170 ml         Physical Exam  Constitutional:       Appearance: He is well-developed.   Eyes:      Conjunctiva/sclera: Conjunctivae normal.      Pupils: Pupils are equal, round, and reactive to light.   Cardiovascular:      Rate and Rhythm: Normal rate and regular rhythm.      Heart sounds: Normal heart sounds. No murmur heard.    No friction rub. No gallop.   Pulmonary:      Effort: Pulmonary effort is normal. No respiratory distress.      Breath sounds: No wheezing or rales.   Abdominal:      General: Bowel sounds are normal. There is no distension.      Palpations: Abdomen is soft. There is no mass.      Tenderness: There is no abdominal tenderness. There is no guarding or rebound.      Hernia: No hernia is present.      Comments: Hernia     Musculoskeletal:         General: No  deformity. Normal range of motion.      Right lower leg: Edema present.      Left lower leg: Edema present.   Skin:     General: Skin is warm and dry.   Neurological:      Mental Status: He is alert and oriented to person, place, and time.      Cranial Nerves: No cranial nerve deficit.           Significant Labs: All pertinent labs within the past 24 hours have been reviewed.    Significant Imaging: I have reviewed all pertinent imaging results/findings within the past 24 hours.      Assessment/Plan:      * Acute congestive heart failure  Patient is identified as having Unknown presumed DIASTOLIC CHF - ECHO IS PENDING heart failure that is Acute. CHF is currently uncontrolled due to Continued edema of extremities and Pulmonary edema/pleural effusion on CXR.  Continue Beta Blocker, ACE/ARB, Furosemide and Nitrate/Vasodilator and monitor clinical status closely. Monitor on telemetry. Patient is on CHF pathway.  Monitor strict Is&Os and daily weights.  Place on fluid restriction of 1.5 L. Continue to stress to patient importance of self efficacy and  on diet for CHF. Last BNP reviewed- and noted below   Recent Labs   Lab 05/12/23  1802   BNP 46   - Keep on Tele monitoring  - TTE performed on 5/15, EF 60% with CVP of around 8  - Trial lasix 80mg IV x1 today  - Increase hydralazine to 50mg TID and continue ISDN 20mg TID  - Continue metoprolol  - Continue Lisinopril 40mg po daily   - pricecheck sglt2 on discharge      Hypomagnesemia  Replete magnesium prn      Diabetic polyneuropathy associated with type 2 diabetes mellitus  Bilateral neuropathy at feet - continue gabapentin  Also has chronic LUE pain, left hand numbness - has had MRI cervical spine this year with some neuro-foraminal stenosis      PAD (peripheral artery disease)  Continue aspirin/statin      CKD (chronic kidney disease), stage III  Monitor daily renal function while on diuretics      OSMANY (acute kidney injury)  Patient with acute kidney injury  likely due to pre-renal azotemia OSMANY is currently worsening. Labs reviewed- Renal function/electrolytes with Estimated Creatinine Clearance: 67 mL/min (A) (based on SCr of 1.5 mg/dL (H)). according to latest data. Monitor urine output and serial BMP and adjust therapy as needed. Avoid nephrotoxins and renally dose meds for GFR listed above.   - urine studies indicate pre-renal despite fluids, indicating maybe cardiorenal as cause  - restarted diuresis on 5/17, repeat RFP this afternoon       S/P liver transplant  Continue tacrolimus 0.5mg BID, decreased in setting of OSMANY  - check tacrolimus level in AM  - Hepatology following    Type 2 diabetes mellitus with diabetic polyneuropathy, with long-term current use of insulin  Patient's FSGs are controlled on current medication regimen.  Last A1c reviewed-   Lab Results   Component Value Date    HGBA1C 6.8 (H) 05/13/2023     Most recent fingerstick glucose reviewed-   Recent Labs   Lab 05/13/23  1543 05/13/23  2123 05/14/23  0816   POCTGLUCOSE 118* 178* 181*     Current correctional scale  Medium  Maintain anti-hyperglycemic dose as follows-   Antihyperglycemics (From admission, onward)    Start     Stop Route Frequency Ordered    05/13/23 0900  insulin detemir U-100 (Levemir) pen 20 Units         -- SubQ Daily 05/13/23 0712    05/13/23 0715  insulin aspart U-100 pen 9 Units         -- SubQ 3 times daily with meals 05/13/23 0712    05/13/23 0057  insulin aspart U-100 pen 1-10 Units         -- SubQ Before meals & nightly PRN 05/12/23 2358        Hold Oral hypoglycemics while patient is in the hospital.      Essential hypertension  As per primary problem      Gout, unspecified  Chronic/stable  Continue allopurinol bid      Acquired hypothyroidism  Normal TSH/T4  - Continue home levothyroxine      VTE Risk Mitigation (From admission, onward)         Ordered     enoxaparin injection 40 mg  Daily         05/12/23 2357     IP VTE HIGH RISK PATIENT  Once         05/12/23 5887      Place sequential compression device  Until discontinued         05/12/23 1817                Discharge Planning   JOJO: 5/21/2023     Code Status: Full Code   Is the patient medically ready for discharge?: No    Reason for patient still in hospital (select all that apply): Patient trending condition and Treatment  Discharge Plan A: Home with family                  Velasquez Navarrete MD  Department of Hospital Medicine   [facility] - Cardiology Stepdown

## 2023-05-19 NOTE — NURSING
Plan of care discussed with patient.  Patient remains free of falls and trauma. Patient ambulating independently, fall precautions in place. Pain managed with prn medications. BG monitored and coverage provided. One time dose Lasix 80mg administered. Discussed medications and care. Patient has no questions at this time. Will continue to monitor.

## 2023-05-19 NOTE — NURSING
Tacrolimus delivered late from pharmacy this morning for morning meds; dose administered around noon once delivered. Per pharm, OK to give next scheduled dose @ 1800.

## 2023-05-20 LAB
ALBUMIN SERPL BCP-MCNC: 3.9 G/DL (ref 3.5–5.2)
ALP SERPL-CCNC: 65 U/L (ref 55–135)
ALT SERPL W/O P-5'-P-CCNC: 22 U/L (ref 10–44)
ANION GAP SERPL CALC-SCNC: 14 MMOL/L (ref 8–16)
AST SERPL-CCNC: 19 U/L (ref 10–40)
BILIRUB SERPL-MCNC: 0.3 MG/DL (ref 0.1–1)
BUN SERPL-MCNC: 52 MG/DL (ref 8–23)
CALCIUM SERPL-MCNC: 9.6 MG/DL (ref 8.7–10.5)
CHLORIDE SERPL-SCNC: 101 MMOL/L (ref 95–110)
CO2 SERPL-SCNC: 25 MMOL/L (ref 23–29)
CREAT SERPL-MCNC: 2 MG/DL (ref 0.5–1.4)
EST. GFR  (NO RACE VARIABLE): 36.4 ML/MIN/1.73 M^2
GLUCOSE SERPL-MCNC: 122 MG/DL (ref 70–110)
MAGNESIUM SERPL-MCNC: 2.2 MG/DL (ref 1.6–2.6)
POCT GLUCOSE: 156 MG/DL (ref 70–110)
POTASSIUM SERPL-SCNC: 4.7 MMOL/L (ref 3.5–5.1)
PROT SERPL-MCNC: 7.2 G/DL (ref 6–8.4)
SODIUM SERPL-SCNC: 140 MMOL/L (ref 136–145)
TACROLIMUS BLD-MCNC: 2.4 NG/ML (ref 5–15)

## 2023-05-20 PROCEDURE — 63600175 PHARM REV CODE 636 W HCPCS: Performed by: HOSPITALIST

## 2023-05-20 PROCEDURE — 83735 ASSAY OF MAGNESIUM: CPT | Performed by: HOSPITALIST

## 2023-05-20 PROCEDURE — 99232 SBSQ HOSP IP/OBS MODERATE 35: CPT | Mod: ,,, | Performed by: STUDENT IN AN ORGANIZED HEALTH CARE EDUCATION/TRAINING PROGRAM

## 2023-05-20 PROCEDURE — 63600175 PHARM REV CODE 636 W HCPCS: Performed by: STUDENT IN AN ORGANIZED HEALTH CARE EDUCATION/TRAINING PROGRAM

## 2023-05-20 PROCEDURE — 80197 ASSAY OF TACROLIMUS: CPT | Performed by: STUDENT IN AN ORGANIZED HEALTH CARE EDUCATION/TRAINING PROGRAM

## 2023-05-20 PROCEDURE — 25000003 PHARM REV CODE 250: Performed by: HOSPITALIST

## 2023-05-20 PROCEDURE — 80053 COMPREHEN METABOLIC PANEL: CPT | Performed by: STUDENT IN AN ORGANIZED HEALTH CARE EDUCATION/TRAINING PROGRAM

## 2023-05-20 PROCEDURE — 25000003 PHARM REV CODE 250: Performed by: STUDENT IN AN ORGANIZED HEALTH CARE EDUCATION/TRAINING PROGRAM

## 2023-05-20 PROCEDURE — 99232 PR SUBSEQUENT HOSPITAL CARE,LEVL II: ICD-10-PCS | Mod: ,,, | Performed by: STUDENT IN AN ORGANIZED HEALTH CARE EDUCATION/TRAINING PROGRAM

## 2023-05-20 PROCEDURE — 20600001 HC STEP DOWN PRIVATE ROOM

## 2023-05-20 RX ADMIN — ALLOPURINOL 100 MG: 300 TABLET ORAL at 09:05

## 2023-05-20 RX ADMIN — HYDRALAZINE HYDROCHLORIDE 50 MG: 50 TABLET ORAL at 03:05

## 2023-05-20 RX ADMIN — OXYCODONE AND ACETAMINOPHEN 1 TABLET: 10; 325 TABLET ORAL at 08:05

## 2023-05-20 RX ADMIN — HYDRALAZINE HYDROCHLORIDE 50 MG: 50 TABLET ORAL at 08:05

## 2023-05-20 RX ADMIN — INSULIN ASPART 9 UNITS: 100 INJECTION, SOLUTION INTRAVENOUS; SUBCUTANEOUS at 07:05

## 2023-05-20 RX ADMIN — ISOSORBIDE DINITRATE 20 MG: 20 TABLET ORAL at 09:05

## 2023-05-20 RX ADMIN — INSULIN ASPART 9 UNITS: 100 INJECTION, SOLUTION INTRAVENOUS; SUBCUTANEOUS at 05:05

## 2023-05-20 RX ADMIN — TRAZODONE HYDROCHLORIDE 50 MG: 50 TABLET ORAL at 08:05

## 2023-05-20 RX ADMIN — INSULIN DETEMIR 20 UNITS: 100 INJECTION, SOLUTION SUBCUTANEOUS at 09:05

## 2023-05-20 RX ADMIN — ASPIRIN 81 MG CHEWABLE TABLET 81 MG: 81 TABLET CHEWABLE at 09:05

## 2023-05-20 RX ADMIN — THERA TABS 1 TABLET: TAB at 09:05

## 2023-05-20 RX ADMIN — GABAPENTIN 600 MG: 400 CAPSULE ORAL at 08:05

## 2023-05-20 RX ADMIN — OXYCODONE AND ACETAMINOPHEN 1 TABLET: 10; 325 TABLET ORAL at 06:05

## 2023-05-20 RX ADMIN — METOPROLOL SUCCINATE 200 MG: 100 TABLET, EXTENDED RELEASE ORAL at 09:05

## 2023-05-20 RX ADMIN — LEVOTHYROXINE SODIUM 88 MCG: 88 TABLET ORAL at 06:05

## 2023-05-20 RX ADMIN — TACROLIMUS 0.5 MG: 0.5 CAPSULE ORAL at 08:05

## 2023-05-20 RX ADMIN — HYDRALAZINE HYDROCHLORIDE 50 MG: 50 TABLET ORAL at 09:05

## 2023-05-20 RX ADMIN — TAMSULOSIN HYDROCHLORIDE 0.4 MG: 0.4 CAPSULE ORAL at 09:05

## 2023-05-20 RX ADMIN — INSULIN ASPART 9 UNITS: 100 INJECTION, SOLUTION INTRAVENOUS; SUBCUTANEOUS at 12:05

## 2023-05-20 RX ADMIN — GABAPENTIN 600 MG: 400 CAPSULE ORAL at 09:05

## 2023-05-20 RX ADMIN — ISOSORBIDE DINITRATE 20 MG: 20 TABLET ORAL at 08:05

## 2023-05-20 RX ADMIN — Medication 1 TABLET: at 08:05

## 2023-05-20 RX ADMIN — SERTRALINE 100 MG: 100 TABLET, FILM COATED ORAL at 09:05

## 2023-05-20 RX ADMIN — ISOSORBIDE DINITRATE 20 MG: 20 TABLET ORAL at 03:05

## 2023-05-20 RX ADMIN — GABAPENTIN 600 MG: 400 CAPSULE ORAL at 03:05

## 2023-05-20 RX ADMIN — OXYCODONE AND ACETAMINOPHEN 1 TABLET: 10; 325 TABLET ORAL at 09:05

## 2023-05-20 RX ADMIN — Medication 1 TABLET: at 09:05

## 2023-05-20 RX ADMIN — TACROLIMUS 0.5 MG: 0.5 CAPSULE ORAL at 05:05

## 2023-05-20 RX ADMIN — ALLOPURINOL 100 MG: 300 TABLET ORAL at 08:05

## 2023-05-20 RX ADMIN — ATORVASTATIN CALCIUM 20 MG: 20 TABLET, FILM COATED ORAL at 09:05

## 2023-05-20 RX ADMIN — ENOXAPARIN SODIUM 40 MG: 40 INJECTION SUBCUTANEOUS at 05:05

## 2023-05-20 RX ADMIN — NIFEDIPINE 90 MG: 60 TABLET, FILM COATED, EXTENDED RELEASE ORAL at 09:05

## 2023-05-20 NOTE — SUBJECTIVE & OBJECTIVE
Interval History: No acute events  2.2 L urine output  Pt notes improvement in lower extremity edema    Review of Systems  Objective:     Vital Signs (Most Recent):  Temp: 97.5 °F (36.4 °C) (05/20/23 1149)  Pulse: 70 (05/20/23 1149)  Resp: 19 (05/20/23 1149)  BP: 125/74 (05/20/23 1149)  SpO2: 97 % (05/20/23 1149) Vital Signs (24h Range):  Temp:  [97.3 °F (36.3 °C)-98.6 °F (37 °C)] 97.5 °F (36.4 °C)  Pulse:  [66-75] 70  Resp:  [17-20] 19  SpO2:  [95 %-99 %] 97 %  BP: ()/(46-76) 125/74     Weight: 119 kg (262 lb 5.6 oz)  Body mass index is 34.61 kg/m².    Intake/Output Summary (Last 24 hours) at 5/20/2023 1252  Last data filed at 5/20/2023 0538  Gross per 24 hour   Intake 462 ml   Output 1601 ml   Net -1139 ml         Physical Exam  Constitutional:       Appearance: He is well-developed.   Eyes:      Conjunctiva/sclera: Conjunctivae normal.      Pupils: Pupils are equal, round, and reactive to light.   Cardiovascular:      Rate and Rhythm: Normal rate and regular rhythm.      Heart sounds: Normal heart sounds. No murmur heard.    No friction rub. No gallop.   Pulmonary:      Effort: Pulmonary effort is normal. No respiratory distress.      Breath sounds: No wheezing or rales.   Abdominal:      General: Bowel sounds are normal. There is no distension.      Palpations: Abdomen is soft. There is no mass.      Tenderness: There is no abdominal tenderness. There is no guarding or rebound.      Hernia: No hernia is present.      Comments: Hernia     Musculoskeletal:         General: No deformity. Normal range of motion.      Right lower leg: Edema present.      Left lower leg: Edema present.   Skin:     General: Skin is warm and dry.   Neurological:      Mental Status: He is alert and oriented to person, place, and time.      Cranial Nerves: No cranial nerve deficit.           Significant Labs: All pertinent labs within the past 24 hours have been reviewed.    Significant Imaging: I have reviewed all pertinent imaging  results/findings within the past 24 hours.

## 2023-05-20 NOTE — ASSESSMENT & PLAN NOTE
Patient is identified as having Unknown presumed DIASTOLIC CHF - ECHO IS PENDING heart failure that is Acute. CHF is currently uncontrolled due to Continued edema of extremities and Pulmonary edema/pleural effusion on CXR.  Continue Beta Blocker, ACE/ARB, Furosemide and Nitrate/Vasodilator and monitor clinical status closely. Monitor on telemetry. Patient is on CHF pathway.  Monitor strict Is&Os and daily weights.  Place on fluid restriction of 1.5 L. Continue to stress to patient importance of self efficacy and  on diet for CHF. Last BNP reviewed- and noted below   No results for input(s): BNP, BNPTRIAGEBLO in the last 168 hours.- Keep on Tele monitoring  - TTE performed on 5/15, EF 60% with CVP of around 8  - Hold diuresis today  - Increase hydralazine to 50mg TID and continue ISDN 20mg TID  - Continue metoprolol  - Continue Lisinopril 40mg po daily   - geovanna sglt2 on discharge

## 2023-05-20 NOTE — PROGRESS NOTES
Steven Veliz - Cardiology [facility] Medicine  Progress Note    Patient Name: Vick Gonzalez  MRN: [id number]  Patient Class: IP- Inpatient   Admission Date: 5/12/2023  Length of Stay: 7 days  Attending Physician: Velasquez Whitaker*  Primary Care Provider: Emanuel Lopez MD        Subjective:     Principal Problem:Acute congestive heart failure        HPI:  67-year-old  man with a history of alcoholic liver disease status post liver transplant, HCV status post viral treatment, type 2 diabetes on insulin hypertension, gout, hypothyroidism and PAD presents the emergency department with complaints shortness of breath and peripheral edema.    He reports that over.  Week he has developed worsening progressive lower extremity edema and has noted intermittent dyspnea on exertion and having some bouts acute dyspnea that occurred her randomly at rest.  He denies chest pain at this time , no chest pressure.   No reported his of CAD or heart failure in the past.  He states prior to his liver transplant that hes experienced peripheral edema.   He reports adherence to home medications/antihypertensive regimen.  Blood pressure elevated on vitals today.  He also feels increased abdominal girth.     He is non-smoker, no recent alcohol use, no drug use.  He reports no increased fluid intake, but diet discussion states his son has been working at [employer] and bringing home fried catfish dinner plate frequently over recent weeks. Patient later eating a taco his wife brought him after my initial visit, has had increased sodium intake reported.     ED Treatment: Lasix 40mg IV x 1 - Lidocaine patch, oxycodone 5mg.           Overview/Hospital Course:  No issues since admission. Diuersing well. EF 60%, CVP around 8. Holding diuretics on 5/15 given concern of overdiuresis. Giving slight fluids on 5/16 due to worsening cr, urine studies ordered. Urine studies demonstrate pre-renal  but labs did not improve with fluids. On exam, patient appears volume overloaded still, will diurese again today.  5/18: no acute events overnight. Patient's renal function did not improve with fluids, may be a component of cardiorenal as his urine studies demonstrated pre-renal etiology. Furthermore, lasix trial today met with improved UOP. Patient reports improvement in breathing, no rales appreciated on exam. Given albumin       Interval History: No acute events  2.2 L urine output  Pt notes improvement in lower extremity edema    Review of Systems  Objective:     Vital Signs (Most Recent):  Temp: 97.5 °F (36.4 °C) (05/20/23 1149)  Pulse: 70 (05/20/23 1149)  Resp: 19 (05/20/23 1149)  BP: 125/74 (05/20/23 1149)  SpO2: 97 % (05/20/23 1149) Vital Signs (24h Range):  Temp:  [97.3 °F (36.3 °C)-98.6 °F (37 °C)] 97.5 °F (36.4 °C)  Pulse:  [66-75] 70  Resp:  [17-20] 19  SpO2:  [95 %-99 %] 97 %  BP: ()/(46-76) 125/74     Weight: 119 kg (262 lb 5.6 oz)  Body mass index is 34.61 kg/m².    Intake/Output Summary (Last 24 hours) at 5/20/2023 1252  Last data filed at 5/20/2023 0538  Gross per 24 hour   Intake 462 ml   Output 1601 ml   Net -1139 ml         Physical Exam  Constitutional:       Appearance: He is well-developed.   Eyes:      Conjunctiva/sclera: Conjunctivae normal.      Pupils: Pupils are equal, round, and reactive to light.   Cardiovascular:      Rate and Rhythm: Normal rate and regular rhythm.      Heart sounds: Normal heart sounds. No murmur heard.    No friction rub. No gallop.   Pulmonary:      Effort: Pulmonary effort is normal. No respiratory distress.      Breath sounds: No wheezing or rales.   Abdominal:      General: Bowel sounds are normal. There is no distension.      Palpations: Abdomen is soft. There is no mass.      Tenderness: There is no abdominal tenderness. There is no guarding or rebound.      Hernia: No hernia is present.      Comments: Hernia     Musculoskeletal:         General: No  deformity. Normal range of motion.      Right lower leg: Edema present.      Left lower leg: Edema present.   Skin:     General: Skin is warm and dry.   Neurological:      Mental Status: He is alert and oriented to person, place, and time.      Cranial Nerves: No cranial nerve deficit.           Significant Labs: All pertinent labs within the past 24 hours have been reviewed.    Significant Imaging: I have reviewed all pertinent imaging results/findings within the past 24 hours.      Assessment/Plan:      * Acute congestive heart failure  Patient is identified as having Unknown presumed DIASTOLIC CHF - ECHO IS PENDING heart failure that is Acute. CHF is currently uncontrolled due to Continued edema of extremities and Pulmonary edema/pleural effusion on CXR.  Continue Beta Blocker, ACE/ARB, Furosemide and Nitrate/Vasodilator and monitor clinical status closely. Monitor on telemetry. Patient is on CHF pathway.  Monitor strict Is&Os and daily weights.  Place on fluid restriction of 1.5 L. Continue to stress to patient importance of self efficacy and  on diet for CHF. Last BNP reviewed- and noted below   No results for input(s): BNP, BNPTRIAGEBLO in the last 168 hours.- Keep on Tele monitoring  - TTE performed on 5/15, EF 60% with CVP of around 8  - Hold diuresis today  - Increase hydralazine to 50mg TID and continue ISDN 20mg TID  - Continue metoprolol  - Continue Lisinopril 40mg po daily   - pricecheck sglt2 on discharge      Hypomagnesemia  Replete magnesium prn      Diabetic polyneuropathy associated with type 2 diabetes mellitus  Bilateral neuropathy at feet - continue gabapentin  Also has chronic LUE pain, left hand numbness - has had MRI cervical spine this year with some neuro-foraminal stenosis      PAD (peripheral artery disease)  Continue aspirin/statin      CKD (chronic kidney disease), stage III  Monitor daily renal function while on diuretics      OSMANY (acute kidney injury)  Patient with acute kidney  injury likely due to pre-renal azotemia OSMANY is currently worsening. Labs reviewed- Renal function/electrolytes with Estimated Creatinine Clearance: 49.7 mL/min (A) (based on SCr of 2 mg/dL (H)). according to latest data. Monitor urine output and serial BMP and adjust therapy as needed. Avoid nephrotoxins and renally dose meds for GFR listed above.   - hold diuresis today      S/P liver transplant  Continue tacrolimus 0.5mg BID, decreased in setting of OSMANY  - check tacrolimus level in AM  - Hepatology following    Type 2 diabetes mellitus with diabetic polyneuropathy, with long-term current use of insulin  Patient's FSGs are controlled on current medication regimen.  Last A1c reviewed-   Lab Results   Component Value Date    HGBA1C 6.8 (H) 05/13/2023     Most recent fingerstick glucose reviewed-   Recent Labs   Lab 05/13/23  1543 05/13/23  2123 05/14/23  0816   POCTGLUCOSE 118* 178* 181*     Current correctional scale  Medium  Maintain anti-hyperglycemic dose as follows-   Antihyperglycemics (From admission, onward)    Start     Stop Route Frequency Ordered    05/13/23 0900  insulin detemir U-100 (Levemir) pen 20 Units         -- SubQ Daily 05/13/23 0712    05/13/23 0715  insulin aspart U-100 pen 9 Units         -- SubQ 3 times daily with meals 05/13/23 0712    05/13/23 0057  insulin aspart U-100 pen 1-10 Units         -- SubQ Before meals & nightly PRN 05/12/23 2358        Hold Oral hypoglycemics while patient is in the hospital.      Essential hypertension  As per primary problem      Gout, unspecified  Chronic/stable  Continue allopurinol bid      Acquired hypothyroidism  Normal TSH/T4  - Continue home levothyroxine      VTE Risk Mitigation (From admission, onward)         Ordered     enoxaparin injection 40 mg  Daily         05/12/23 2357     IP VTE HIGH RISK PATIENT  Once         05/12/23 2357     Place sequential compression device  Until discontinued         05/12/23 2357                Discharge Planning    JOJO: 5/21/2023     Code Status: Full Code   Is the patient medically ready for discharge?: No    Reason for patient still in hospital (select all that apply): Patient trending condition and Treatment  Discharge Plan A: Home with family        Velasquez Navarrete MD  Department of Hospital Medicine   [facility] - Cardiology Stepdown

## 2023-05-20 NOTE — ASSESSMENT & PLAN NOTE
Patient with acute kidney injury likely due to pre-renal azotemia OSMANY is currently worsening. Labs reviewed- Renal function/electrolytes with Estimated Creatinine Clearance: 49.7 mL/min (A) (based on SCr of 2 mg/dL (H)). according to latest data. Monitor urine output and serial BMP and adjust therapy as needed. Avoid nephrotoxins and renally dose meds for GFR listed above.   - hold diuresis today

## 2023-05-21 VITALS
BODY MASS INDEX: 34.39 KG/M2 | HEIGHT: 73 IN | TEMPERATURE: 98 F | SYSTOLIC BLOOD PRESSURE: 125 MMHG | DIASTOLIC BLOOD PRESSURE: 71 MMHG | OXYGEN SATURATION: 97 % | RESPIRATION RATE: 19 BRPM | HEART RATE: 73 BPM | WEIGHT: 259.5 LBS

## 2023-05-21 LAB
ALBUMIN SERPL BCP-MCNC: 3.9 G/DL (ref 3.5–5.2)
ALP SERPL-CCNC: 62 U/L (ref 55–135)
ALT SERPL W/O P-5'-P-CCNC: 21 U/L (ref 10–44)
ANION GAP SERPL CALC-SCNC: 11 MMOL/L (ref 8–16)
AST SERPL-CCNC: 19 U/L (ref 10–40)
BILIRUB SERPL-MCNC: 0.3 MG/DL (ref 0.1–1)
BUN SERPL-MCNC: 45 MG/DL (ref 8–23)
CALCIUM SERPL-MCNC: 9.9 MG/DL (ref 8.7–10.5)
CHLORIDE SERPL-SCNC: 104 MMOL/L (ref 95–110)
CO2 SERPL-SCNC: 25 MMOL/L (ref 23–29)
CREAT SERPL-MCNC: 1.5 MG/DL (ref 0.5–1.4)
EST. GFR  (NO RACE VARIABLE): 51.3 ML/MIN/1.73 M^2
GLUCOSE SERPL-MCNC: 134 MG/DL (ref 70–110)
MAGNESIUM SERPL-MCNC: 2.1 MG/DL (ref 1.6–2.6)
POCT GLUCOSE: 112 MG/DL (ref 70–110)
POCT GLUCOSE: 146 MG/DL (ref 70–110)
POTASSIUM SERPL-SCNC: 4.4 MMOL/L (ref 3.5–5.1)
PROT SERPL-MCNC: 7.2 G/DL (ref 6–8.4)
SODIUM SERPL-SCNC: 140 MMOL/L (ref 136–145)
TACROLIMUS BLD-MCNC: 2.3 NG/ML (ref 5–15)

## 2023-05-21 PROCEDURE — 80197 ASSAY OF TACROLIMUS: CPT | Performed by: STUDENT IN AN ORGANIZED HEALTH CARE EDUCATION/TRAINING PROGRAM

## 2023-05-21 PROCEDURE — 80053 COMPREHEN METABOLIC PANEL: CPT | Performed by: STUDENT IN AN ORGANIZED HEALTH CARE EDUCATION/TRAINING PROGRAM

## 2023-05-21 PROCEDURE — 63600175 PHARM REV CODE 636 W HCPCS: Performed by: STUDENT IN AN ORGANIZED HEALTH CARE EDUCATION/TRAINING PROGRAM

## 2023-05-21 PROCEDURE — 25000003 PHARM REV CODE 250: Performed by: HOSPITALIST

## 2023-05-21 PROCEDURE — 83735 ASSAY OF MAGNESIUM: CPT | Performed by: HOSPITALIST

## 2023-05-21 PROCEDURE — 25000003 PHARM REV CODE 250: Performed by: STUDENT IN AN ORGANIZED HEALTH CARE EDUCATION/TRAINING PROGRAM

## 2023-05-21 PROCEDURE — 36415 COLL VENOUS BLD VENIPUNCTURE: CPT | Performed by: STUDENT IN AN ORGANIZED HEALTH CARE EDUCATION/TRAINING PROGRAM

## 2023-05-21 PROCEDURE — 99239 PR HOSPITAL DISCHARGE DAY,>30 MIN: ICD-10-PCS | Mod: ,,, | Performed by: STUDENT IN AN ORGANIZED HEALTH CARE EDUCATION/TRAINING PROGRAM

## 2023-05-21 PROCEDURE — 99239 HOSP IP/OBS DSCHRG MGMT >30: CPT | Mod: ,,, | Performed by: STUDENT IN AN ORGANIZED HEALTH CARE EDUCATION/TRAINING PROGRAM

## 2023-05-21 RX ORDER — LISINOPRIL 40 MG/1
TABLET ORAL
Qty: 90 TABLET | Refills: 3 | Status: SHIPPED | OUTPATIENT
Start: 2023-05-21 | End: 2023-05-31

## 2023-05-21 RX ORDER — FUROSEMIDE 20 MG/1
40 TABLET ORAL DAILY PRN
Qty: 60 TABLET | Refills: 0 | Status: SHIPPED | OUTPATIENT
Start: 2023-05-21 | End: 2023-12-11

## 2023-05-21 RX ORDER — ISOSORBIDE DINITRATE AND HYDRALAZINE HYDROCHLORIDE 37.5; 2 MG/1; MG/1
1 TABLET ORAL 3 TIMES DAILY
Qty: 90 TABLET | Refills: 11 | Status: SHIPPED | OUTPATIENT
Start: 2023-05-21 | End: 2024-05-20

## 2023-05-21 RX ADMIN — INSULIN DETEMIR 20 UNITS: 100 INJECTION, SOLUTION SUBCUTANEOUS at 08:05

## 2023-05-21 RX ADMIN — TAMSULOSIN HYDROCHLORIDE 0.4 MG: 0.4 CAPSULE ORAL at 08:05

## 2023-05-21 RX ADMIN — ATORVASTATIN CALCIUM 20 MG: 20 TABLET, FILM COATED ORAL at 08:05

## 2023-05-21 RX ADMIN — HYDRALAZINE HYDROCHLORIDE 50 MG: 50 TABLET ORAL at 08:05

## 2023-05-21 RX ADMIN — ALLOPURINOL 100 MG: 300 TABLET ORAL at 08:05

## 2023-05-21 RX ADMIN — GABAPENTIN 600 MG: 400 CAPSULE ORAL at 08:05

## 2023-05-21 RX ADMIN — OXYCODONE AND ACETAMINOPHEN 1 TABLET: 10; 325 TABLET ORAL at 11:05

## 2023-05-21 RX ADMIN — ISOSORBIDE DINITRATE 20 MG: 20 TABLET ORAL at 08:05

## 2023-05-21 RX ADMIN — THERA TABS 1 TABLET: TAB at 08:05

## 2023-05-21 RX ADMIN — NIFEDIPINE 90 MG: 60 TABLET, FILM COATED, EXTENDED RELEASE ORAL at 08:05

## 2023-05-21 RX ADMIN — Medication 1 TABLET: at 08:05

## 2023-05-21 RX ADMIN — SERTRALINE 100 MG: 100 TABLET, FILM COATED ORAL at 08:05

## 2023-05-21 RX ADMIN — LEVOTHYROXINE SODIUM 88 MCG: 88 TABLET ORAL at 06:05

## 2023-05-21 RX ADMIN — OXYCODONE AND ACETAMINOPHEN 1 TABLET: 10; 325 TABLET ORAL at 06:05

## 2023-05-21 RX ADMIN — INSULIN ASPART 9 UNITS: 100 INJECTION, SOLUTION INTRAVENOUS; SUBCUTANEOUS at 08:05

## 2023-05-21 RX ADMIN — METOPROLOL SUCCINATE 200 MG: 100 TABLET, EXTENDED RELEASE ORAL at 08:05

## 2023-05-21 RX ADMIN — ASPIRIN 81 MG CHEWABLE TABLET 81 MG: 81 TABLET CHEWABLE at 08:05

## 2023-05-21 RX ADMIN — TACROLIMUS 0.5 MG: 0.5 CAPSULE ORAL at 08:05

## 2023-05-21 NOTE — PROGRESS NOTES
Pt received discharge instructions, explained all up coming appointments and medications, pt stated understanding.  Pt in no respiratory distress. All vital signs stable. IV removed without incidence, no resp distress or pain at this time. Awaiting transport for discharge.

## 2023-05-21 NOTE — PLAN OF CARE
Patient is mindful of I & o and is sure to use the urinal..... he was instructed to call if he needed help

## 2023-05-21 NOTE — TREATMENT PLAN
[facility]  Hepatology  Treatment Plan    Patient Name: Vick Gonzalez  MRN: [id number]  Admission Date: 5/12/2023  Hospital Length of Stay: 8 days    Subjective:     Interval History:  NAEON.    No current facility-administered medications on file prior to encounter.     Current Outpatient Medications on File Prior to Encounter   Medication Sig Dispense Refill    albuterol (VENTOLIN HFA) 90 mcg/actuation inhaler Inhale 2 puffs into the lungs every 6 (six) hours as needed for Wheezing or Shortness of Breath. Rescue 8.5 g 1    aluminum-magnesium hydroxide-simethicone (MAALOX) 200-200-20 mg/5 mL Susp Take 30 mLs by mouth 4 (four) times daily before meals and nightly. (Patient taking differently: Take 30 mLs by mouth every 6 (six) hours as needed.) 769 mL 0    aspirin 81 MG Chew Take 1 tablet (81 mg total) by mouth once daily. 90 tablet 3    calcium carbonate-vitamin D3 (CALCIUM 600 WITH VITAMIN D3) 600 mg(1,500mg) -400 unit Chew Take 1 tablet by mouth once daily.       clotrimazole (LOTRIMIN) 1 % cream APPLY TOPICALLY TWICE A DAY 30 g 1    CYANOCOBALAMIN, VITAMIN B-12, ORAL Chew 1 tablet by mouth 2 to 3 times a month.      diphenhydrAMINE (BENADRYL) 25 mg capsule Take 25 mg by mouth daily as needed for Allergies (Cold).      gabapentin (NEURONTIN) 800 MG tablet Take 1 tablet (800 mg total) by mouth 3 (three) times daily. 90 tablet 11    insulin glargine U-300 conc (TOUJEO MAX U-300 SOLOSTAR) 300 unit/mL (3 mL) insulin pen Inject 40 Units into the skin once daily. 3 mL 11    insulin lispro (HUMALOG KWIKPEN INSULIN) 100 unit/mL pen Inject 20 Units into the skin 3 (three) times daily with meals. 15 mL 11    levothyroxine (SYNTHROID) 88 MCG tablet TAKE 1 TABLET BY MOUTH EVERY DAY 90 tablet 1    LIDOcaine 4 % PtMd Apply topically to lower back once daily as needed for pain.      metoprolol succinate (TOPROL-XL) 200 MG 24 hr tablet TAKE 1 TABLET BY MOUTH EVERY DAY 90 tablet 3    multivitamin (THERAGRAN)  "per tablet Take 1 tablet by mouth once daily.       NIFEdipine (PROCARDIA-XL) 90 MG (OSM) 24 hr tablet Take 1 tablet (90 mg total) by mouth once daily. 90 tablet 3    ondansetron (ZOFRAN-ODT) 4 MG TbDL Take 1 tablet (4 mg total) by mouth every 6 (six) hours as needed (Nausea). 15 tablet 0    tacrolimus (PROGRAF) 1 MG Cap Take 2 capsules (2 mg total) by mouth every morning AND 1 capsule (1 mg total) every evening. 90 capsule 11    traZODone (DESYREL) 50 MG tablet TAKE 1 TABLET BY MOUTH EVERY EVENING. 90 tablet 3    [DISCONTINUED] ibuprofen (ADVIL,MOTRIN) 200 MG tablet Take 400 mg by mouth daily as needed for Pain.      [DISCONTINUED] lisinopriL (PRINIVIL,ZESTRIL) 40 MG tablet TAKE 1 TABLET BY MOUTH EVERY DAY 90 tablet 3    allopurinoL (ZYLOPRIM) 100 MG tablet TAKE 1 TABLET BY MOUTH TWICE A  tablet 3    blood sugar diagnostic (TRUE METRIX GLUCOSE TEST STRIP) Strp USE 3 TIMES DAILY TO TEST BLOOD GLUCOSE LEVEL 100 strip 11    blood-glucose meter Misc 1 Device by Misc.(Non-Drug; Combo Route) route 3 (three) times daily. 1 each 0    blood-glucose sensor (DEXCOM G6 SENSOR) Viviane Use as directed 3 each 11    blood-glucose transmitter (DEXCOM G6 TRANSMITTER) Viviane Use as directed 1 each 11    flash glucose sensor (FREESTYLE PALLAVI 2 SENSOR) Kit One sensor every 14 days 2 kit prn    lancets 30 gauge Misc 1 lancet by Misc.(Non-Drug; Combo Route) route 4 (four) times daily before meals and nightly. 200 each 11    NOVOFINE PLUS 32 gauge x 1/6" Ndle       rosuvastatin (CRESTOR) 5 MG tablet TAKE 1 TABLET BY MOUTH EVERY DAY 90 tablet 11    [DISCONTINUED] hydrALAZINE (APRESOLINE) 25 MG tablet Take 25 mg by mouth every 12 (twelve) hours.      [DISCONTINUED] insulin aspart U-100 (NOVOLOG FLEXPEN U-100 INSULIN) 100 unit/mL (3 mL) InPn pen Inject 20 Units into the skin 3 (three) times daily with meals. 15 mL 11       Review of patient's allergies indicates:  No Known Allergies      Objective:     Vitals:    05/21/23 1058   BP:  "   Pulse: 73   Resp:    Temp:        Significant Labs:  No results for input(s): HGB in the last 168 hours.      Lab Results   Component Value Date    WBC 7.99 05/12/2023    HGB 11.7 (L) 05/12/2023    HCT 38.2 (L) 05/12/2023    MCV 92 05/12/2023     (L) 05/12/2023       Lab Results   Component Value Date     05/21/2023    K 4.4 05/21/2023     05/21/2023    CO2 25 05/21/2023    BUN 45 (H) 05/21/2023    CREATININE 1.5 (H) 05/21/2023    CALCIUM 9.9 05/21/2023    ANIONGAP 11 05/21/2023    ESTGFRAFRICA >60.0 07/25/2022    EGFRNONAA 52.7 (A) 07/25/2022       Lab Results   Component Value Date    ALT 21 05/21/2023    AST 19 05/21/2023     (H) 04/24/2017    ALKPHOS 62 05/21/2023    BILITOT 0.3 05/21/2023       Lab Results   Component Value Date    INR 1.0 04/24/2023    INR 1.0 01/23/2023    INR 1.0 07/12/2022       Significant Imaging:  Reviewed pertinent radiology findings.       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 Please call with any additional questions, concerns or changes in the patient's clinical status. We will continue to follow.   Celestino Paredes MD  Gastroenterology Fellow PGY IV   [facility]

## 2023-05-22 ENCOUNTER — PATIENT OUTREACH (OUTPATIENT)
Dept: ADMINISTRATIVE | Facility: CLINIC | Age: 68
End: 2023-05-22
Payer: MEDICARE

## 2023-05-22 ENCOUNTER — TELEPHONE (OUTPATIENT)
Dept: CARDIOLOGY | Facility: CLINIC | Age: 68
End: 2023-05-22
Payer: MEDICARE

## 2023-05-22 DIAGNOSIS — R06.02 SOB (SHORTNESS OF BREATH): Primary | ICD-10-CM

## 2023-05-22 NOTE — PROGRESS NOTES
C3 nurse spoke with Vick Gonzalez for a TCC post hospital discharge follow up call. The patient has a scheduled [facility] appointment with Emanuel Lopez MD on 5/26/23 @ [id number].

## 2023-05-22 NOTE — TELEPHONE ENCOUNTER
[facility] Phone Enrollment Complete.    Phone enrollment completed due to : [facility]ulatory [facility]    Called and spoke to PT via phone. Introduced self to pt as HFTCC nurse navigator.      Patient education will be given: Now and at first F2F Visit     Reviewed the following key points of HFTCC program with pt and family:              1.) Take your medications as directed.               2.) Weight yourself daily              3.) Follow low salt and limited fluid diet.               4.) Stop smoking and start exercising              5.) Go to your appointments and call your team.          Reviewed plan for follow up once discharged to include phone calls, in person and virtual visits to assist pt optimizing their heart failure medication regimen and encouraging healthy lifestyle modifications.  Reminded pt that program will assist them over the next 4-6 weeks and then patient will be transferred to long term care provider .  Reminded pt how to contact HFTCC navigator via phone and or via [employer].      Pt given appointment today via phone. PT states just give me an appointment and call me.        PT can verbalize read back of information given.  Pt to read over information when he gets HCG at first visit and will contact team with any questions or concerns.       1) Teach: Dry weight and how daily weights show fluid gain.    2) Teach: Hidden Sodium in Deli Meats/Cheese, Sausages/Hot Dogs, Anything in a box, bottle or can as well as sauces and seasoning used eg. De's, Chester's, Slap Ya Mama un less you use the No Salt versions.     3) Teach: Watch the portions, if a can of soup says 480 mg Sodium but the can also says 2 portions in the can then you actually get 960 mg of sodium if you eat the whole can.    4) Teach: Fluid Restriction, 1.5-2.0 Liters of fluid i.e. 1.5-2.0 quarts. All fluids must be counted eg. The milk in  your cereal, the fluid in your soup, the ice you use for cotton  mouth. Speaking of cotton mouth, use a couple of ice cubes in a cup. You can roll them around inn your mouth and put them back in the cup and keep doing this while you watch TV or go about your day. This will save you fluid on your restriction. Always measure your fluid, don't guess.    5) Start exercising: Walking briskly with a purpose not like you are going to the [employer] but like you just won Super Bowl tickets. Walk in a safe place and go for further not faster.    6) Keep your appointments and call us when needed. Do your daily weights/VS so when we call you we know how you are doing.    Pt needs a scale when he comes to his first Clinic appointment.

## 2023-05-22 NOTE — PLAN OF CARE
Pt discharged home with no post-acute needs. F/U appts scheduled bu W.    Wanda Yuen [facility]  Case Management Department  [email or web address]    Steven Veliz - Cardiology Stepdown  Discharge Final Note    Primary Care Provider: Emanuel Lopez MD    Expected Discharge Date: 5/21/2023    Final Discharge Note (most recent)       Final Note - 05/22/23 1129          Final Note    Assessment Type Final Discharge Note     Anticipated Discharge Disposition Home or Self Care     What phone number can be called within the next 1-3 days to see how you are doing after discharge? [phone]     Hospital Resources/Appts/Education Provided Provided patient/caregiver with written discharge plan information;Appointments scheduled and added to AVS;Appointments scheduled by Navigator/Coordinator     Hospital Follow Up  Appt(s) scheduled? Yes     Any social issues identified prior to discharge? No        Post-Acute Status    Post-Acute Authorization Other     Other Status No Post-Acute Service Needs                     Important Message from Medicare      Future Appointments   Date Time Provider Department Center   5/26/2023  1:30 PM Emanuel Lopez MD [facility]   8/21/2023  8:45 AM LAB, [facility] [place]   9/5/2023 10:15 AM [facility]

## 2023-05-26 ENCOUNTER — PATIENT MESSAGE (OUTPATIENT)
Dept: PAIN MEDICINE | Facility: OTHER | Age: 68
End: 2023-05-26
Payer: MEDICARE

## 2023-05-26 ENCOUNTER — OFFICE VISIT (OUTPATIENT)
Dept: FAMILY MEDICINE | Facility: CLINIC | Age: 68
End: 2023-05-26
Payer: MEDICARE

## 2023-05-26 VITALS
WEIGHT: 260.38 LBS | DIASTOLIC BLOOD PRESSURE: 74 MMHG | TEMPERATURE: 98 F | BODY MASS INDEX: 34.51 KG/M2 | OXYGEN SATURATION: 98 % | HEIGHT: 73 IN | SYSTOLIC BLOOD PRESSURE: 122 MMHG | HEART RATE: 73 BPM

## 2023-05-26 DIAGNOSIS — R06.2 WHEEZING: ICD-10-CM

## 2023-05-26 DIAGNOSIS — R06.02 SOB (SHORTNESS OF BREATH): ICD-10-CM

## 2023-05-26 DIAGNOSIS — E11.65 UNCONTROLLED TYPE 2 DIABETES MELLITUS WITH HYPERGLYCEMIA: ICD-10-CM

## 2023-05-26 DIAGNOSIS — D84.9 IMMUNOSUPPRESSION: ICD-10-CM

## 2023-05-26 DIAGNOSIS — D69.6 THROMBOCYTOPENIA: ICD-10-CM

## 2023-05-26 DIAGNOSIS — R05.9 COUGH: ICD-10-CM

## 2023-05-26 DIAGNOSIS — G99.2 MYELOPATHY IN DISEASES CLASSIFIED ELSEWHERE: ICD-10-CM

## 2023-05-26 DIAGNOSIS — I50.9 ACUTE CONGESTIVE HEART FAILURE, UNSPECIFIED HEART FAILURE TYPE: Primary | ICD-10-CM

## 2023-05-26 DIAGNOSIS — J84.10 CALCIFIED GRANULOMA OF LUNG: ICD-10-CM

## 2023-05-26 DIAGNOSIS — N17.9 AKI (ACUTE KIDNEY INJURY): ICD-10-CM

## 2023-05-26 DIAGNOSIS — F10.21 ALCOHOL DEPENDENCE, IN REMISSION: ICD-10-CM

## 2023-05-26 DIAGNOSIS — J43.9 PULMONARY EMPHYSEMA, UNSPECIFIED EMPHYSEMA TYPE: ICD-10-CM

## 2023-05-26 PROCEDURE — 3074F PR MOST RECENT SYSTOLIC BLOOD PRESSURE < 130 MM HG: ICD-10-PCS | Mod: CPTII,S$GLB,, | Performed by: FAMILY MEDICINE

## 2023-05-26 PROCEDURE — 99496 TRANSJ CARE MGMT HIGH F2F 7D: CPT | Mod: S$GLB,,, | Performed by: FAMILY MEDICINE

## 2023-05-26 PROCEDURE — 1111F PR DISCHARGE MEDS RECONCILED W/ CURRENT OUTPATIENT MED LIST: ICD-10-PCS | Mod: CPTII,S$GLB,, | Performed by: FAMILY MEDICINE

## 2023-05-26 PROCEDURE — 4010F PR ACE/ARB THEARPY RXD/TAKEN: ICD-10-PCS | Mod: CPTII,S$GLB,, | Performed by: FAMILY MEDICINE

## 2023-05-26 PROCEDURE — 99999 PR PBB SHADOW E&M-EST. PATIENT-LVL III: CPT | Mod: PBBFAC,,, | Performed by: FAMILY MEDICINE

## 2023-05-26 PROCEDURE — 99499 RISK ADDL DX/OHS AUDIT: ICD-10-PCS | Mod: S$GLB,,, | Performed by: FAMILY MEDICINE

## 2023-05-26 PROCEDURE — 3044F PR MOST RECENT HEMOGLOBIN A1C LEVEL <7.0%: ICD-10-PCS | Mod: CPTII,S$GLB,, | Performed by: FAMILY MEDICINE

## 2023-05-26 PROCEDURE — 1125F PR PAIN SEVERITY QUANTIFIED, PAIN PRESENT: ICD-10-PCS | Mod: CPTII,S$GLB,, | Performed by: FAMILY MEDICINE

## 2023-05-26 PROCEDURE — 3078F DIAST BP <80 MM HG: CPT | Mod: CPTII,S$GLB,, | Performed by: FAMILY MEDICINE

## 2023-05-26 PROCEDURE — 99999 PR PBB SHADOW E&M-EST. PATIENT-LVL III: ICD-10-PCS | Mod: PBBFAC,,, | Performed by: FAMILY MEDICINE

## 2023-05-26 PROCEDURE — 1159F MED LIST DOCD IN RCRD: CPT | Mod: CPTII,S$GLB,, | Performed by: FAMILY MEDICINE

## 2023-05-26 PROCEDURE — 1125F AMNT PAIN NOTED PAIN PRSNT: CPT | Mod: CPTII,S$GLB,, | Performed by: FAMILY MEDICINE

## 2023-05-26 PROCEDURE — 3008F PR BODY MASS INDEX (BMI) DOCUMENTED: ICD-10-PCS | Mod: CPTII,S$GLB,, | Performed by: FAMILY MEDICINE

## 2023-05-26 PROCEDURE — 3288F PR FALLS RISK ASSESSMENT DOCUMENTED: ICD-10-PCS | Mod: CPTII,S$GLB,, | Performed by: FAMILY MEDICINE

## 2023-05-26 PROCEDURE — 99496 TRANSITIONAL CARE MANAGE SERVICE 7 DAY DISCHARGE: ICD-10-PCS | Mod: S$GLB,,, | Performed by: FAMILY MEDICINE

## 2023-05-26 PROCEDURE — 3008F BODY MASS INDEX DOCD: CPT | Mod: CPTII,S$GLB,, | Performed by: FAMILY MEDICINE

## 2023-05-26 PROCEDURE — 1101F PT FALLS ASSESS-DOCD LE1/YR: CPT | Mod: CPTII,S$GLB,, | Performed by: FAMILY MEDICINE

## 2023-05-26 PROCEDURE — 1111F DSCHRG MED/CURRENT MED MERGE: CPT | Mod: CPTII,S$GLB,, | Performed by: FAMILY MEDICINE

## 2023-05-26 PROCEDURE — 3044F HG A1C LEVEL LT 7.0%: CPT | Mod: CPTII,S$GLB,, | Performed by: FAMILY MEDICINE

## 2023-05-26 PROCEDURE — 3288F FALL RISK ASSESSMENT DOCD: CPT | Mod: CPTII,S$GLB,, | Performed by: FAMILY MEDICINE

## 2023-05-26 PROCEDURE — 4010F ACE/ARB THERAPY RXD/TAKEN: CPT | Mod: CPTII,S$GLB,, | Performed by: FAMILY MEDICINE

## 2023-05-26 PROCEDURE — 3074F SYST BP LT 130 MM HG: CPT | Mod: CPTII,S$GLB,, | Performed by: FAMILY MEDICINE

## 2023-05-26 PROCEDURE — 1101F PR PT FALLS ASSESS DOC 0-1 FALLS W/OUT INJ PAST YR: ICD-10-PCS | Mod: CPTII,S$GLB,, | Performed by: FAMILY MEDICINE

## 2023-05-26 PROCEDURE — 3078F PR MOST RECENT DIASTOLIC BLOOD PRESSURE < 80 MM HG: ICD-10-PCS | Mod: CPTII,S$GLB,, | Performed by: FAMILY MEDICINE

## 2023-05-26 PROCEDURE — 1159F PR MEDICATION LIST DOCUMENTED IN MEDICAL RECORD: ICD-10-PCS | Mod: CPTII,S$GLB,, | Performed by: FAMILY MEDICINE

## 2023-05-26 PROCEDURE — 99499 UNLISTED E&M SERVICE: CPT | Mod: S$GLB,,, | Performed by: FAMILY MEDICINE

## 2023-05-26 RX ORDER — BLOOD-GLUCOSE,RECEIVER,CONT
EACH MISCELLANEOUS
Qty: 1 EACH | Refills: 0 | Status: SHIPPED | OUTPATIENT
Start: 2023-05-26 | End: 2023-08-09

## 2023-05-26 RX ORDER — ALBUTEROL SULFATE 90 UG/1
2 AEROSOL, METERED RESPIRATORY (INHALATION) EVERY 6 HOURS PRN
Qty: 8.5 G | Refills: 1 | Status: SHIPPED | OUTPATIENT
Start: 2023-05-26 | End: 2023-08-04

## 2023-05-26 NOTE — PROGRESS NOTES
Transitional Care Note  Subjective:       Patient ID: Vick Gonzalez is a 67 y.o. male.  Chief Complaint: Hospital Follow Up and Referral (Diabetic Education for Dexcom assistance)    Family and/or Caretaker present at visit?  No.  Diagnostic tests reviewed/disposition: No diagnosic tests pending after this hospitalization.  Disease/illness education: y  Home health/community services discussion/referrals: Patient does not have home health established from hospital visit.  They do not need home health.  If needed, we will set up home health for the patient.   Establishment or re-establishment of referral orders for community resources: No other necessary community resources.   Discussion with other health care providers: No discussion with other health care providers necessary.   HPI  Review of Systems    Objective:      Physical Exam    Assessment:       1. Acute congestive heart failure, unspecified heart failure type    2. Cough    3. Wheezing    4. SOB (shortness of breath)    5. Uncontrolled type 2 diabetes mellitus with hyperglycemia        Plan:               (Portions of this note were dictated using voice recognition software and may contain dictation related errors in spelling/grammar/syntax not found on text review)    CC:   Chief Complaint   Patient presents with    Hospital Follow Up    Referral     Diabetic Education for Dexcom assistance       HPI: 67 y.o. male     Recent hospitalization on 05/12/2023.  Discharge summary not available, reviewed available aspects of the medical chart from the hospital stay.  Presented to ED w shortness breath and peripheral edema worsened over the week prior to admission.  No prior history of CAD or heart failure in the past.  Chest x-ray showed mild pulmonary edema Admitted for presumed acute diastolic HF. Dietary indiscretions, increase sodium intake.  Was diuresed in-hospital with Lasix.  Echo showed EF 60%.  Did have OSMANY possibly from over-diuresis and so  diuretics were held temporarily but then restarted given volume overload concerns    Started on isosorbide hydralazine 20/37.5 t.i.d.  Prescribed furosemide 40 mg p.r.n. for weight gain greater than 2-3 lb in 1 day or 4-6 lb over 1 week  Additionally for hypertension has been on lisinopril 40 mg daily although was advised holding hospital until further discussion today  metoprolol 200 mg daily   Nifedipine 90 mg daily     Echo 05/12/2023   Technically difficult study  The left ventricle is normal in size with concentric remodeling and normal systolic function. The estimated ejection fraction is 60%.  Normal right ventricular size with normal right ventricular systolic function.  Normal left ventricular diastolic function.  Intermediate central venous pressure (8 mmHg).  There is no evidence of intracardiac shunting.       Creatinine (mg/dL)   Date Value   05/21/2023 1.5 (H)   05/20/2023 2.0 (H)   05/19/2023 1.9 (H)   05/18/2023 1.9 (H)   05/17/2023 2.0 (H)   05/17/2023 1.5 (H)   05/16/2023 1.6 (H)   05/16/2023 1.6 (H)   05/15/2023 1.5 (H)   05/14/2023 1.2   05/13/2023 1.1   05/12/2023 1.2   04/24/2023 1.2   01/23/2023 1.1   12/05/2022 1.3     eGFR (mL/min/1.73 m^2)   Date Value   05/21/2023 51.3 (A)   05/20/2023 36.4 (A)   05/19/2023 38.7 (A)   05/18/2023 38.7 (A)   05/17/2023 36.4 (A)   05/17/2023 51.3 (A)   05/16/2023 47.5 (A)   05/16/2023 47.5 (A)   05/15/2023 51.3 (A)   05/14/2023 >60.0   05/13/2023 >60.0   05/12/2023 >60.0   04/24/2023 >60.0   01/23/2023 >60.0   12/05/2022 >60.0     BNP (pg/mL)   Date Value   05/12/2023 46   11/11/2020 <10   02/21/2017 16     Interval  Feeling better.  Swelling has resolved in legs.  Denies any current shortness of breath but states that from time to time he might feel short of breath.  Has an appointment with Cardiology coming up shortly.  No known ischemic heart disease history .  nonsmoker.  Does not drink alcohol.  Discussed sodium indiscretion prior to admission as noted  "above.  Also stated that he was drinking about 6 energy drinks a day ( "Rip it"--130 mg caffeine per can, 780 mg per day given his regimen above).  Since admission has not been drinking these drinks any longer      Other Medical history as below  Diabetes with peripheral neuropathy:  Complicated by  dietary noncompliance now followed by endocrinology (last seen January 2022), was on Trulicity but not currently taking.  His current regimen of diabetes was adjusted to glargine (toujeo) 40 units, NovoLog to up to 20 units t.i.d. with meals.  Was believed that a lot of his hyperglycemia issues prior were relating to dietary and insulin noncompliance.   .  A1c has increased to 9.7 (aug 2022).  However, recently states that he has been checking his sugars and is getting usually readings around 90s in the morning and 130s to 140s if taken 2 hours postprandial.  Compliant with the above medication therapy.  Concerned about weight gain was if he can take something lose weight.  Was on G LP 1 agonist therapy but I believe it was  advised stay off this given history of pancreatitis (seems be related to alcohol use in the past but does not drink longer) . has Dexcom system.  Got a new  but feels like it is not working.  Would like a prescription for new 1.     Dyslipidemia on Crestor 5mg daily.     Hypothyroidism:  Synthroid 88mcg.       Anxiety on Zoloft 100 mg daily.       Liver Transplant secondary to end-stage liver disease hepatitis C and prior alcohol abuse.:  Followed by hepatology.  On Prograf.   fibroscan showed stage II fibrosis     Chronic pain, followed by pain management.  Was prior On oxycodone along with his gabapentin.  However failed drug test with positive cocaine and Soma.  History of cocaine abuse,     pain management had recommended continuing with gabapentin and interventional management if desired.         Neuro surgery following for cervical disc disease, Had cervical spine fusion surgery August " 2022.  Followed up with neuro surgery and was having some worsening left upper extremity pain.  EMG showed chronic left C7 radiculopathy and left ulnar neuropathy across the elbow.  Planning on ALCIDES    Past Medical History:   Diagnosis Date    Acute congestive heart failure 5/12/2023    Anemia     Anxiety     Cataract     Chronic pain syndrome 07/13/2011    CKD (chronic kidney disease), stage III     Diabetes mellitus type II, uncontrolled     Discitis of lumbosacral region 01/16/2015    ED (erectile dysfunction)     Encounter for blood transfusion     Genital herpes     Gout, arthritis     History of alcohol abuse     History of hepatitis C, s/p successful Rx w/ SVR24 (cure) - 5/2018 08/23/2011    Completed 24wks Epclusa + RBV w/SVR12 - 2/2018  -     History of positive PPD, treatment status unknown     Pulmonary granulomas, negative sputum cultures for AFB and indeterminate quantferon test    History of substance abuse     Hypertension     Hypothyroidism     Liver replaced by transplant 08/23/2011    DATE: 12/16/2013  LIVER BIOPSY : REASON:  hep C staging  PATHOLOGY COMMITTEE NOTE/PLAN: :grade  1 / stage 1        Pancreatitis 2016    Peptic ulcer disease        Past Surgical History:   Procedure Laterality Date    ANTERIOR CERVICAL DISCECTOMY W/ FUSION N/A 8/22/2022    Procedure: Procedure: ACDF 4-7 LOS: 4.0 Anesthesia: General Blood: Type & Screen Radiology: C-Arm Microscope: ------- SNS: EMG, SEP, MEP Brace: Somis Bed: Regular Bed, Shoulder Strap Headrest:------ Position: Supine Equipment: Depuy;  Surgeon: Titi Christian MD;  Location: [facility] OR;  Service: Neurosurgery;  Laterality: N/A;  Depuy  confirmed CW 8/19  Neuro monitoring confirmed CW 8/19    CARPAL TUNNEL RELEASE Left 10/29/2021    Procedure: RELEASE, CARPAL TUNNEL;  Surgeon: Jmaeson Alejo Jr., MD;  Location: [facility] OR;  Service: Orthopedics;  Laterality: Left;    CHOLECYSTECTOMY      DECOMPRESSION OF CERVICAL SPINE BY ANTERIOR APPROACH WITH FUSION   8/22/2022    Procedure: DECOMPRESSION AND FUSION, SPINE, CERVICAL, ANTERIOR APPROACH;  Surgeon: Titi Christian MD;  Location: [facility] OR;  Service: Neurosurgery;;    INJECTION OF JOINT Right 12/2/2019    Procedure: INJECTION, JOINT SI;  Surgeon: Thu Penn MD;  Location: [facility] PAIN [facility]GT;  Service: Pain Management;  Laterality: Right;  RT SI JNT INJ    LIVER TRANSPLANT  06/2010    SPINE SURGERY         Family History   Problem Relation Age of Onset    Cancer Mother     Diabetes Mother     Heart disease Mother     Diabetes Sister     Cancer Maternal Uncle 82        colon CA    Drug abuse Daughter     Melanoma Neg Hx     Psoriasis Neg Hx     Lupus Neg Hx     Eczema Neg Hx     Acne Neg Hx        Social History     Tobacco Use    Smoking status: Former     Passive exposure: Never    Smokeless tobacco: Never   Substance Use Topics    Alcohol use: No     Comment: over 5 years ago, none currently    Drug use: Not Currently     Comment: Former cocaine use       Lab Results   Component Value Date    WBC 7.99 05/12/2023    HGB 11.7 (L) 05/12/2023    HCT 38.2 (L) 05/12/2023    MCV 92 05/12/2023     (L) 05/12/2023    CHOL 156 05/13/2023    TRIG 213 (H) 05/13/2023    HDL 29 (L) 05/13/2023    ALT 21 05/21/2023    AST 19 05/21/2023    BILITOT 0.3 05/21/2023    ALKPHOS 62 05/21/2023     05/21/2023    K 4.4 05/21/2023     05/21/2023    CREATININE 1.5 (H) 05/21/2023    ESTGFRAFRICA >60.0 07/25/2022    EGFRNONAA 52.7 (A) 07/25/2022    EGFRNORACEVR 51.3 (A) 05/21/2023    CALCIUM 9.9 05/21/2023    ALBUMIN 3.9 05/21/2023    BUN 45 (H) 05/21/2023    CO2 25 05/21/2023    TSH 1.288 05/13/2023    PSA 0.22 01/19/2022    PSADIAG 0.28 10/09/2017    INR 1.0 04/24/2023    HGBA1C 6.8 (H) 05/13/2023    MICALBCREAT 30.8 (H) 08/16/2022    LDLCALC 84.4 05/13/2023     (H) 05/21/2023    LITQHGUD76BW 30 10/11/2021                 Vital signs reviewed  Vitals:    05/26/23 1321   BP: 122/74   BP Location: Left arm   Patient  "Position: Sitting   BP Method: Medium (Manual)   Pulse: 73   Temp: 98.3 °F (36.8 °C)   TempSrc: Oral   SpO2: 98%   Weight: 118.1 kg (260 lb 5.8 oz)   Height: 6' 1" (1.854 m)       Wt Readings from Last 4 Encounters:   05/26/23 118.1 kg (260 lb 5.8 oz)   05/21/23 117.7 kg (259 lb 7.7 oz)   04/18/23 117.9 kg (260 lb)   03/14/23 118.2 kg (260 lb 9.3 oz)       PE:   APPEARANCE: Well nourished, well developed, in no acute distress.    HEAD: Normocephalic, atraumatic.  EYES: PERRL. EOMI.   Conjunctivae noninjected.  EARS: TM's intact. Light reflex normal. No retraction or perforation  NOSE: Mucosa pink. Airway clear.  MOUTH & THROAT: No tonsillar enlargement. No pharyngeal erythema or exudate.   NECK: Supple with no cervical lymphadenopathy.    CHEST: Good inspiratory effort. Lungs clear to auscultation with no wheezes or crackles.  CARDIOVASCULAR: Normal S1, S2. No rubs, murmurs, or gallops.  ABDOMEN: Bowel sounds normal. Not distended. Soft. No tenderness or masses. No organomegaly.  EXTREMITIES: No edema, cyanosis, or clubbing.      IMPRESSION  1. Acute congestive heart failure, unspecified heart failure type    2. Cough    3. Wheezing    4. SOB (shortness of breath)    5. Uncontrolled type 2 diabetes mellitus with hyperglycemia            PLAN  No orders of the defined types were placed in this encounter.    Medications Ordered This Encounter   Medications    albuterol (VENTOLIN HFA) 90 mcg/actuation inhaler     Sig: Inhale 2 puffs into the lungs every 6 (six) hours as needed for Wheezing or Shortness of Breath. Rescue     Dispense:  8.5 g     Refill:  1    blood-glucose meter,continuous (DEXCOM G6 ) Misc     Sig: Use with dexcome g6 system (transmitter,sensor)     Dispense:  1 each     Refill:  0      Admission out of concern for acute diastolic heart failure.  Currently feeling better.  No longer on diuretic management, has Lasix p.r.n. if he needs it.  Doing well on Bi-dil in addition to his other " medications above.  Cardiology appointment upcoming.  Stay off the energy drinks as above.  Significant attention to limiting sodium    I sent over another order for his Dexcom .  If continued issues, can work with on further connections and usage     A1c stable on recent testing     Request placed for albuterol just to have on hand as needed, sent    As lab orders in the chart, will need to have serially followed renal function             SCREENINGS   Colonoscopy 2015, return in 10 years   prostate testing 1/19/22 PSA     Immunizations   Pneumovax 2019  PCV 10/29/2018   COVID vaccine x 3   Hepatitis B series up-to-date   Tdap 2021   Can get zoster vaccine at pharmacy

## 2023-05-29 ENCOUNTER — HOSPITAL ENCOUNTER (OUTPATIENT)
Facility: OTHER | Age: 68
Discharge: HOME | End: 2023-05-29
Attending: ANESTHESIOLOGY | Admitting: ANESTHESIOLOGY
Payer: MEDICARE

## 2023-05-29 VITALS
RESPIRATION RATE: 16 BRPM | HEART RATE: 98 BPM | BODY MASS INDEX: 33.88 KG/M2 | DIASTOLIC BLOOD PRESSURE: 76 MMHG | TEMPERATURE: 98 F | WEIGHT: 264 LBS | OXYGEN SATURATION: 99 % | HEIGHT: 74 IN | SYSTOLIC BLOOD PRESSURE: 142 MMHG

## 2023-05-29 DIAGNOSIS — M54.12 CERVICAL RADICULOPATHY: ICD-10-CM

## 2023-05-29 DIAGNOSIS — G89.29 CHRONIC PAIN: ICD-10-CM

## 2023-05-29 DIAGNOSIS — M54.2 CHRONIC NECK PAIN WITH HISTORY OF CERVICAL SPINAL SURGERY: Primary | ICD-10-CM

## 2023-05-29 DIAGNOSIS — Z98.890 CHRONIC NECK PAIN WITH HISTORY OF CERVICAL SPINAL SURGERY: Primary | ICD-10-CM

## 2023-05-29 DIAGNOSIS — G89.28 CHRONIC NECK PAIN WITH HISTORY OF CERVICAL SPINAL SURGERY: Primary | ICD-10-CM

## 2023-05-29 DIAGNOSIS — M50.30 DDD (DEGENERATIVE DISC DISEASE), CERVICAL: ICD-10-CM

## 2023-05-29 LAB — POCT GLUCOSE: 99 MG/DL (ref 70–110)

## 2023-05-29 PROCEDURE — 63600175 PHARM REV CODE 636 W HCPCS: Performed by: ANESTHESIOLOGY

## 2023-05-29 PROCEDURE — 64479 PR INJECT ANES/STEROID FORAMEN CERV/THORACIC W IMG GUIDE ,1 LEVEL: ICD-10-PCS | Mod: LT,,, | Performed by: ANESTHESIOLOGY

## 2023-05-29 PROCEDURE — 64479 NJX AA&/STRD TFRM EPI C/T 1: CPT | Mod: LT | Performed by: ANESTHESIOLOGY

## 2023-05-29 PROCEDURE — 64479 NJX AA&/STRD TFRM EPI C/T 1: CPT | Mod: LT,,, | Performed by: ANESTHESIOLOGY

## 2023-05-29 PROCEDURE — 25000003 PHARM REV CODE 250: Performed by: STUDENT IN AN ORGANIZED HEALTH CARE EDUCATION/TRAINING PROGRAM

## 2023-05-29 PROCEDURE — 25500020 PHARM REV CODE 255: Performed by: ANESTHESIOLOGY

## 2023-05-29 PROCEDURE — 25000003 PHARM REV CODE 250: Performed by: ANESTHESIOLOGY

## 2023-05-29 RX ORDER — FENTANYL CITRATE 50 UG/ML
INJECTION, SOLUTION INTRAMUSCULAR; INTRAVENOUS
Status: DISCONTINUED | OUTPATIENT
Start: 2023-05-29 | End: 2023-05-29 | Stop reason: HOSPADM

## 2023-05-29 RX ORDER — LIDOCAINE HYDROCHLORIDE 20 MG/ML
INJECTION, SOLUTION INFILTRATION; PERINEURAL
Status: DISCONTINUED | OUTPATIENT
Start: 2023-05-29 | End: 2023-05-29 | Stop reason: HOSPADM

## 2023-05-29 RX ORDER — DEXAMETHASONE SODIUM PHOSPHATE 10 MG/ML
INJECTION INTRAMUSCULAR; INTRAVENOUS
Status: DISCONTINUED | OUTPATIENT
Start: 2023-05-29 | End: 2023-05-29 | Stop reason: HOSPADM

## 2023-05-29 RX ORDER — MIDAZOLAM HYDROCHLORIDE 1 MG/ML
INJECTION INTRAMUSCULAR; INTRAVENOUS
Status: DISCONTINUED | OUTPATIENT
Start: 2023-05-29 | End: 2023-05-29 | Stop reason: HOSPADM

## 2023-05-29 RX ORDER — LIDOCAINE HYDROCHLORIDE 10 MG/ML
INJECTION, SOLUTION EPIDURAL; INFILTRATION; INTRACAUDAL; PERINEURAL
Status: DISCONTINUED | OUTPATIENT
Start: 2023-05-29 | End: 2023-05-29 | Stop reason: HOSPADM

## 2023-05-29 RX ORDER — SODIUM CHLORIDE 9 MG/ML
500 INJECTION, SOLUTION INTRAVENOUS CONTINUOUS
Status: ACTIVE | OUTPATIENT
Start: 2023-05-29 | End: 2023-05-30

## 2023-05-29 NOTE — PROGRESS NOTES
[facility] Provider Note (Initial Clinic) Consult Note    Date of original referral: 5/22/23  Age: 67 y.o.  Gender: male  Ethnicity: Black or   Number of admissions for CHF within the preceding year: 1   Duration of CHF: recent admission  Type of Congestive Heart Failure: Diastolic   Etiology: unspecified   Social history: denies   Enrolled in Infusion suite: no    Diagnostic Labs:   EKG - 05/13/2023  CXR - 05/12/2023  ECHO - 05/15/2023  Stress test -   Stress echo -   Pharmacologic stress -   Cardiac catheterization -    Cardiac MRI -     Lab Results   Component Value Date     05/21/2023     05/20/2023    K 4.4 05/21/2023    K 4.7 05/20/2023     05/21/2023     05/20/2023    CO2 25 05/21/2023    CO2 25 05/20/2023     (H) 05/21/2023     (H) 05/20/2023    BUN 45 (H) 05/21/2023    BUN 52 (H) 05/20/2023    CREATININE 1.5 (H) 05/21/2023    CREATININE 2.0 (H) 05/20/2023    CALCIUM 9.9 05/21/2023    CALCIUM 9.6 05/20/2023    PROT 7.2 05/21/2023    PROT 7.2 05/20/2023    ALBUMIN 3.9 05/21/2023    ALBUMIN 3.9 05/20/2023    BILITOT 0.3 05/21/2023    BILITOT 0.3 05/20/2023    ALKPHOS 62 05/21/2023    ALKPHOS 65 05/20/2023    AST 19 05/21/2023    AST 19 05/20/2023    ALT 21 05/21/2023    ALT 22 05/20/2023    ANIONGAP 11 05/21/2023    ANIONGAP 14 05/20/2023    ESTGFRAFRICA >60.0 07/25/2022    ESTGFRAFRICA 51.9 (A) 07/12/2022    EGFRNONAA 52.7 (A) 07/25/2022    EGFRNONAA 44.9 (A) 07/12/2022       Lab Results   Component Value Date    WBC 7.99 05/12/2023    WBC 7.86 04/24/2023    RBC 4.14 (L) 05/12/2023    RBC 4.17 (L) 04/24/2023    HGB 11.7 (L) 05/12/2023    HGB 12.2 (L) 04/24/2023    HCT 38.2 (L) 05/12/2023    HCT 38.6 (L) 04/24/2023    MCV 92 05/12/2023    MCV 93 04/24/2023    MCH 28.3 05/12/2023    MCH 29.3 04/24/2023    MCHC 30.6 (L) 05/12/2023    MCHC 31.6 (L) 04/24/2023    RDW 14.1 05/12/2023    RDW 14.1 04/24/2023     (L) 05/12/2023     (L) 04/24/2023    MPV  13.0 (H) 05/12/2023    MPV 13.2 (H) 04/24/2023    IMMGR 0.5 05/12/2023    IMMGR 0.3 04/24/2023    IGABS 0.04 05/12/2023    IGABS 0.02 04/24/2023    LYMPH 2.3 05/12/2023    LYMPH 28.9 05/12/2023    MONO 0.9 05/12/2023    MONO 11.1 05/12/2023    EOS 0.4 05/12/2023    EOS 0.5 04/24/2023    BASO 0.03 05/12/2023    BASO 0.03 04/24/2023    NRBC 0 05/12/2023    NRBC 0 04/24/2023    GRAN 4.3 05/12/2023    GRAN 54.2 05/12/2023    EOSINOPHIL 4.9 05/12/2023    EOSINOPHIL 6.9 04/24/2023    BASOPHIL 0.4 05/12/2023    BASOPHIL 0.4 04/24/2023    PLTEST Decreased (A) 08/22/2022    PLTEST Appears normal 10/13/2021    ANISO Slight 02/28/2017    ANISO Slight 02/27/2017    HYPO Occasional 02/27/2017    HYPO Occasional 02/21/2017       Lab Results   Component Value Date    BNP 46 05/12/2023    BNP <10 11/11/2020    MG 2.1 05/21/2023    MG 2.2 05/20/2023    PHOS 4.0 05/17/2023    PHOS 3.6 05/16/2023    TROPONINI 0.008 05/12/2023    TROPONINI <0.006 02/24/2022    HGBA1C 6.8 (H) 05/13/2023    HGBA1C 7.8 (H) 12/05/2022    TSH 1.288 05/13/2023    TSH 2.026 01/25/2022    FREET4 1.05 05/13/2023    FREET4 0.86 03/28/2018       Lab Results   Component Value Date    IRON 43 (L) 05/13/2023    TIBC 187 (L) 12/15/2008    FERRITIN 671 (H) 12/15/2008    CHOL 156 05/13/2023    TRIG 213 (H) 05/13/2023    HDL 29 (L) 05/13/2023    LDLCALC 84.4 05/13/2023    CHOLHDL 18.6 (L) 05/13/2023    TOTALCHOLEST 5.4 (H) 05/13/2023    NONHDLCHOL 127 05/13/2023    COLORU Yellow 05/12/2023    APPEARANCEUA Clear 05/12/2023    PHUR 7.0 05/12/2023    SPECGRAV 1.020 05/12/2023    PROTEINUA Trace (A) 05/12/2023    GLUCUA Negative 05/12/2023    KETONESU Negative 05/12/2023    BILIRUBINUA Negative 05/12/2023    OCCULTUA Negative 05/12/2023    NITRITE Negative 05/12/2023    LEUKOCYTESUR Negative 05/12/2023       No implanted cardiac devices    Current Outpatient Medications on File Prior to Visit   Medication Sig Dispense Refill    albuterol (VENTOLIN HFA) 90 mcg/actuation  inhaler Inhale 2 puffs into the lungs every 6 (six) hours as needed for Wheezing or Shortness of Breath. Rescue 8.5 g 1    allopurinoL (ZYLOPRIM) 100 MG tablet TAKE 1 TABLET BY MOUTH TWICE A  tablet 3    aluminum-magnesium hydroxide-simethicone (MAALOX) 200-200-20 mg/5 mL Susp Take 30 mLs by mouth 4 (four) times daily before meals and nightly. (Patient taking differently: Take 30 mLs by mouth every 6 (six) hours as needed.) 769 mL 0    aspirin 81 MG Chew Take 1 tablet (81 mg total) by mouth once daily. 90 tablet 3    blood sugar diagnostic (TRUE METRIX GLUCOSE TEST STRIP) Strp USE 3 TIMES DAILY TO TEST BLOOD GLUCOSE LEVEL 100 strip 11    blood-glucose meter Misc 1 Device by Misc.(Non-Drug; Combo Route) route 3 (three) times daily. 1 each 0    blood-glucose meter,continuous (DEXCOM G6 ) Misc Use with Jigsee g6 system (transmitter,sensor) 1 each 0    blood-glucose sensor (DEXCOM G6 SENSOR) Viviane Use as directed 3 each 11    blood-glucose transmitter (DEXCOM G6 TRANSMITTER) Viviane Use as directed 1 each 11    calcium carbonate-vitamin D3 (CALCIUM 600 WITH VITAMIN D3) 600 mg(1,500mg) -400 unit Chew Take 1 tablet by mouth once daily.       clotrimazole (LOTRIMIN) 1 % cream APPLY TOPICALLY TWICE A DAY 30 g 1    CYANOCOBALAMIN, VITAMIN B-12, ORAL Chew 1 tablet by mouth 2 to 3 times a month.      diphenhydrAMINE (BENADRYL) 25 mg capsule Take 25 mg by mouth daily as needed for Allergies (Cold).      furosemide (LASIX) 20 MG tablet Take 2 tablets (40 mg total) by mouth daily as needed (For Weight Gain > 2-3 lbs in 1 day or 4-6 lbs over 1 week notify PCP and take 40 mg daily for three days). 60 tablet 0    gabapentin (NEURONTIN) 800 MG tablet Take 1 tablet (800 mg total) by mouth 3 (three) times daily. 90 tablet 11    insulin glargine U-300 conc (TOUJEO MAX U-300 SOLOSTAR) 300 unit/mL (3 mL) insulin pen Inject 40 Units into the skin once daily. 3 mL 11    insulin lispro (HUMALOG KWIKPEN INSULIN) 100 unit/mL pen  "Inject 20 Units into the skin 3 (three) times daily with meals. 15 mL 11    isosorbide-hydrALAZINE 20-37.5 mg (BIDIL) 20-37.5 mg Tab Take 1 tablet by mouth 3 (three) times daily. 90 tablet 11    lancets 30 gauge Misc 1 lancet by Misc.(Non-Drug; Combo Route) route 4 (four) times daily before meals and nightly. 200 each 11    levothyroxine (SYNTHROID) 88 MCG tablet TAKE 1 TABLET BY MOUTH EVERY DAY 90 tablet 1    LIDOcaine 4 % PtMd Apply topically to lower back once daily as needed for pain.      lisinopriL (PRINIVIL,ZESTRIL) 40 MG tablet HOLD until instructed to resume by your PCP 90 tablet 3    metoprolol succinate (TOPROL-XL) 200 MG 24 hr tablet TAKE 1 TABLET BY MOUTH EVERY DAY 90 tablet 3    multivitamin (THERAGRAN) per tablet Take 1 tablet by mouth once daily.       NIFEdipine (PROCARDIA-XL) 90 MG (OSM) 24 hr tablet Take 1 tablet (90 mg total) by mouth once daily. 90 tablet 3    NOVOFINE PLUS 32 gauge x 1/6" Ndle       ondansetron (ZOFRAN-ODT) 4 MG TbDL Take 1 tablet (4 mg total) by mouth every 6 (six) hours as needed (Nausea). 15 tablet 0    rosuvastatin (CRESTOR) 5 MG tablet TAKE 1 TABLET BY MOUTH EVERY DAY 90 tablet 11    tacrolimus (PROGRAF) 1 MG Cap Take 2 capsules (2 mg total) by mouth every morning AND 1 capsule (1 mg total) every evening. 90 capsule 11    traZODone (DESYREL) 50 MG tablet TAKE 1 TABLET BY MOUTH EVERY EVENING. 90 tablet 3    [DISCONTINUED] insulin aspart U-100 (NOVOLOG FLEXPEN U-100 INSULIN) 100 unit/mL (3 mL) InPn pen Inject 20 Units into the skin 3 (three) times daily with meals. 15 mL 11     Current Facility-Administered Medications on File Prior to Visit   Medication Dose Route Frequency Provider Last Rate Last Admin    0.9%  NaCl infusion  500 mL Intravenous Continuous Paco Colbert DO   500 mL at 05/29/23 1040    [DISCONTINUED] dexAMETHasone sodium phos (PF) injection    PRN Thu Penn MD   10 mg at 05/29/23 1040    [DISCONTINUED] fentaNYL injection    PRN Thu Penn MD   25 " mcg at 05/29/23 1042    [DISCONTINUED] iohexoL (OMNIPAQUE 300) injection    PRN Thu Penn MD   5 mL at 05/29/23 1040    [DISCONTINUED] LIDOcaine (PF) 10 mg/ml (1%) injection    PRN hTu Penn MD   5 mL at 05/29/23 1040    [DISCONTINUED] LIDOcaine HCL 20 mg/ml (2%) injection    PRN Thu Penn MD   10 mL at 05/29/23 1040    [DISCONTINUED] midazolam (VERSED) 1 mg/mL injection    PRN Thu Penn MD   2 mg at 05/29/23 1038         HPI:  Patient unable to quantify distance walked before SOB. Reports mild intermittent SOB. Ambulating to clinic today using cane for assistance and pace normal without appreciable SOB   Patient sleeps on 2 number of pillows at BL   Patient wakes up SOB, has to get out of bed, associated cough- denies   Palpitations - occasional heart skipping a beat    Dizzy, light-headed, pre-syncope or syncope- occasional brief dizziness lasting couple seconds in duration. Denies pre-syncope/syncope   Since discharge frequency of performing weights, home weight and weight change- performing home weights. Weight 254-255lbs on home scale   Other information felt pertinent to HPI: Mr. Vick Gonzalez is a 66 yo male with a PMHx of alcoholic liver disease s/p liver transplant, HCV s/p viral treatment, T2DM on insulin, HTN, gout, hypothyroidism and PAD who presents to first [facility] visit following recent admission for new HFpEF.     PHYSICAL:   Vitals:    05/31/23 1345   BP: (!) 100/58   Pulse: 78      Wt Readings from Last 3 Encounters:   05/31/23 116.6 kg (257 lb 1.6 oz)   05/29/23 119.7 kg (264 lb)   05/26/23 118.1 kg (260 lb 5.8 oz)       JVD: no   Heart rhythm: regular  Cardiac murmur: No    S3: no  S4: no  Lungs: clear  Hepatojugular reflux: no  Edema: trace BLE edema in ankles    Echo 5/15/23:  Technically difficult study  The left ventricle is normal in size with concentric remodeling and normal systolic function. The estimated ejection fraction is 60%.  Normal right ventricular size with normal right  ventricular systolic function.  Normal left ventricular diastolic function.  Intermediate central venous pressure (8 mmHg).  There is no evidence of intracardiac shunting.    ASSESSMENT: HFpEF    PLAN:      Patient Instructions:   Instruct the patient to notify this clinic if HH, a physician or an advanced care provider wants to change medication one of their HF medications   Activity and Diet restrictions:   Recommend 2-3 gram sodium restriction and 1500cc- 2000cc fluid restriction.  Encourage physical activity with graded exercise program.  Requested patient to weigh themselves daily, and to notify us if their weight increases by more than 3 lbs in 1 day or 5 lbs in 3 days.    Assigned dry weight on home scale: 255lbs  Medication changes (include current dose and changed dose): NYHA Class II symptoms. Well compensated on exam. Continue lasix 40mg daily prn. Bump in Cr from baseline. BP well controlled. Decrease lisinopril from 40mg daily to 20mg daily with close monitoring of renal function.  Upcoming labs and date anticipated: RTC in 1.5 weeks for repeat labs and close follow up    Randi Washburn PA-C

## 2023-05-29 NOTE — DISCHARGE SUMMARY
Discharge Note  Short Stay      SUMMARY     Admit Date: 5/29/2023    Attending Physician: Thu Penn      Discharge Physician: Thu Penn      Discharge Date: 5/29/2023 10:35 AM    Procedure(s) (LRB):  INJECTION, STEROID, EPIDURAL, TRANSFORAMINAL APPROACH,  LEFT C7-T1 DIRECT REF (Left)    Final Diagnosis: Cervical radiculopathy [M54.12]    Disposition: Home or self care    Patient Instructions:   Current Discharge Medication List        CONTINUE these medications which have NOT CHANGED    Details   albuterol (VENTOLIN HFA) 90 mcg/actuation inhaler Inhale 2 puffs into the lungs every 6 (six) hours as needed for Wheezing or Shortness of Breath. Rescue  Qty: 8.5 g, Refills: 1    Associated Diagnoses: Cough; Wheezing; SOB (shortness of breath)      allopurinoL (ZYLOPRIM) 100 MG tablet TAKE 1 TABLET BY MOUTH TWICE A DAY  Qty: 180 tablet, Refills: 3      aluminum-magnesium hydroxide-simethicone (MAALOX) 200-200-20 mg/5 mL Susp Take 30 mLs by mouth 4 (four) times daily before meals and nightly.  Qty: 769 mL, Refills: 0      aspirin 81 MG Chew Take 1 tablet (81 mg total) by mouth once daily.  Qty: 90 tablet, Refills: 3      blood sugar diagnostic (TRUE METRIX GLUCOSE TEST STRIP) Strp USE 3 TIMES DAILY TO TEST BLOOD GLUCOSE LEVEL  Qty: 100 strip, Refills: 11    Associated Diagnoses: Uncontrolled type 2 diabetes mellitus with stage 2 chronic kidney disease, without long-term current use of insulin      blood-glucose meter Misc 1 Device by Misc.(Non-Drug; Combo Route) route 3 (three) times daily.  Qty: 1 each, Refills: 0    Associated Diagnoses: Uncontrolled type 2 diabetes mellitus with hyperglycemia      blood-glucose meter,continuous (DEXCOM G6 ) Misc Use with dexcome g6 system (transmitter,sensor)  Qty: 1 each, Refills: 0    Associated Diagnoses: Uncontrolled type 2 diabetes mellitus with hyperglycemia      blood-glucose sensor (DEXCOM G6 SENSOR) Viviane Use as directed  Qty: 3 each, Refills: 11      blood-glucose  transmitter (DEXCOM G6 TRANSMITTER) Viviane Use as directed  Qty: 1 each, Refills: 11      calcium carbonate-vitamin D3 (CALCIUM 600 WITH VITAMIN D3) 600 mg(1,500mg) -400 unit Chew Take 1 tablet by mouth once daily.       clotrimazole (LOTRIMIN) 1 % cream APPLY TOPICALLY TWICE A DAY  Qty: 30 g, Refills: 1      CYANOCOBALAMIN, VITAMIN B-12, ORAL Chew 1 tablet by mouth 2 to 3 times a month.      diphenhydrAMINE (BENADRYL) 25 mg capsule Take 25 mg by mouth daily as needed for Allergies (Cold).      furosemide (LASIX) 20 MG tablet Take 2 tablets (40 mg total) by mouth daily as needed (For Weight Gain > 2-3 lbs in 1 day or 4-6 lbs over 1 week notify PCP and take 40 mg daily for three days).  Qty: 60 tablet, Refills: 0      gabapentin (NEURONTIN) 800 MG tablet Take 1 tablet (800 mg total) by mouth 3 (three) times daily.  Qty: 90 tablet, Refills: 11    Associated Diagnoses: Acquired spondylolisthesis; Postlaminectomy syndrome of lumbar region; Lumbar radiculopathy; Chronic pain syndrome; Chronic, continuous use of opioids      insulin glargine U-300 conc (TOUJEO MAX U-300 SOLOSTAR) 300 unit/mL (3 mL) insulin pen Inject 40 Units into the skin once daily.  Qty: 3 mL, Refills: 11    Associated Diagnoses: Uncontrolled type 2 diabetes mellitus with hyperglycemia      insulin lispro (HUMALOG KWIKPEN INSULIN) 100 unit/mL pen Inject 20 Units into the skin 3 (three) times daily with meals.  Qty: 15 mL, Refills: 11    Comments: dr echeverria to change to humalog 12-9-21      isosorbide-hydrALAZINE 20-37.5 mg (BIDIL) 20-37.5 mg Tab Take 1 tablet by mouth 3 (three) times daily.  Qty: 90 tablet, Refills: 11      lancets 30 gauge Misc 1 lancet by Misc.(Non-Drug; Combo Route) route 4 (four) times daily before meals and nightly.  Qty: 200 each, Refills: 11    Associated Diagnoses: Uncontrolled type 2 diabetes mellitus with stage 2 chronic kidney disease, without long-term current use of insulin      levothyroxine (SYNTHROID) 88 MCG tablet TAKE 1  "TABLET BY MOUTH EVERY DAY  Qty: 90 tablet, Refills: 1      LIDOcaine 4 % PtMd Apply topically to lower back once daily as needed for pain.      lisinopriL (PRINIVIL,ZESTRIL) 40 MG tablet HOLD until instructed to resume by your PCP  Qty: 90 tablet, Refills: 3    Comments: .      metoprolol succinate (TOPROL-XL) 200 MG 24 hr tablet TAKE 1 TABLET BY MOUTH EVERY DAY  Qty: 90 tablet, Refills: 3      multivitamin (THERAGRAN) per tablet Take 1 tablet by mouth once daily.       NIFEdipine (PROCARDIA-XL) 90 MG (OSM) 24 hr tablet Take 1 tablet (90 mg total) by mouth once daily.  Qty: 90 tablet, Refills: 3    Comments: .  Associated Diagnoses: Essential hypertension      NOVOFINE PLUS 32 gauge x 1/6" Ndle       ondansetron (ZOFRAN-ODT) 4 MG TbDL Take 1 tablet (4 mg total) by mouth every 6 (six) hours as needed (Nausea).  Qty: 15 tablet, Refills: 0      rosuvastatin (CRESTOR) 5 MG tablet TAKE 1 TABLET BY MOUTH EVERY DAY  Qty: 90 tablet, Refills: 11      tacrolimus (PROGRAF) 1 MG Cap Take 2 capsules (2 mg total) by mouth every morning AND 1 capsule (1 mg total) every evening.  Qty: 90 capsule, Refills: 11    Associated Diagnoses: S/P liver transplant      traZODone (DESYREL) 50 MG tablet TAKE 1 TABLET BY MOUTH EVERY EVENING.  Qty: 90 tablet, Refills: 3                 Discharge Diagnosis: Cervical radiculopathy [M54.12]  Condition on Discharge: Stable with no complications to procedure   Diet on Discharge: Same as before.  Activity: as per instruction sheet.  Discharge to: Home with a responsible adult.  Follow up: 2-4 weeks       Please call my office or pager at [phone] if experienced any weakness or loss of sensation, fever > 101.5, pain uncontrolled with oral medications, persistent nausea/vomiting/or diarrhea, redness or drainage from the incisions, or any other worrisome concerns. If physician on call was not reached or could not communicate with our office for any reason please go to the nearest emergency department "

## 2023-05-29 NOTE — H&P
HPI  Vick Gonzalez presents for Procedure(s):  INJECTION, STEROID, EPIDURAL, TRANSFORAMINAL APPROACH,  LEFT C7-T1 DIRECT REF    Recent anticoagulation/antiplatelets reviewed and verified with nursing.  Recent antibiotics/recent infections reviewed and verified with nursing.    Past Medical History:   Diagnosis Date    Acute congestive heart failure 5/12/2023    Anemia     Anxiety     Cataract     Chronic pain syndrome 07/13/2011    CKD (chronic kidney disease), stage III     Diabetes mellitus type II, uncontrolled     Discitis of lumbosacral region 01/16/2015    ED (erectile dysfunction)     Encounter for blood transfusion     Genital herpes     Gout, arthritis     History of alcohol abuse     History of hepatitis C, s/p successful Rx w/ SVR24 (cure) - 5/2018 08/23/2011    Completed 24wks Epclusa + RBV w/SVR12 - 2/2018  -     History of positive PPD, treatment status unknown     Pulmonary granulomas, negative sputum cultures for AFB and indeterminate quantferon test    History of substance abuse     Hypertension     Hypothyroidism     Liver replaced by transplant 08/23/2011    DATE: 12/16/2013  LIVER BIOPSY : REASON:  hep C staging  PATHOLOGY COMMITTEE NOTE/PLAN: :grade  1 / stage 1        Pancreatitis 2016    Peptic ulcer disease     Pulmonary emphysema, unspecified emphysema type 5/26/2023     Past Surgical History:   Procedure Laterality Date    ANTERIOR CERVICAL DISCECTOMY W/ FUSION N/A 8/22/2022    Procedure: Procedure: ACDF 4-7 LOS: 4.0 Anesthesia: General Blood: Type & Screen Radiology: C-Arm Microscope: ------- SNS: EMG, SEP, MEP Brace: [place] Bed: Regular Bed, Shoulder Strap Headrest:------ Position: Supine Equipment: Depuy;  Surgeon: Titi Christian MD;  Location: [facility] OR;  Service: Neurosurgery;  Laterality: N/A;  Depuy  confirmed CW 8/19  Neuro monitoring confirmed CW 8/19    CARPAL TUNNEL RELEASE Left 10/29/2021    Procedure: RELEASE, CARPAL TUNNEL;  Surgeon: Jameson Alejo Jr., MD;  Location: [facility]  OR;  Service: Orthopedics;  Laterality: Left;    CHOLECYSTECTOMY      DECOMPRESSION OF CERVICAL SPINE BY ANTERIOR APPROACH WITH FUSION  8/22/2022    Procedure: DECOMPRESSION AND FUSION, SPINE, CERVICAL, ANTERIOR APPROACH;  Surgeon: Titi Christian MD;  Location: [facility] OR;  Service: Neurosurgery;;    INJECTION OF JOINT Right 12/2/2019    Procedure: INJECTION, JOINT SI;  Surgeon: Thu Penn MD;  Location: [facility] PAIN MGT;  Service: Pain Management;  Laterality: Right;  RT SI JNT INJ    LIVER TRANSPLANT  06/2010    SPINE SURGERY       Review of patient's allergies indicates:  No Known Allergies     PMHx, PSHx, Allergies, Medications reviewed in epic      ROS negative except pain complaints in HPI    OBJECTIVE:    There were no vitals taken for this visit.    PHYSICAL EXAMINATION:    GENERAL: Well appearing, in no acute distress, alert and oriented to name, situation, location.  PSYCH:  Mood and affect appropriate.  SKIN: Skin color, texture, turgor normal, no rashes or lesions at site of procedure.  CV: regular rate with palpation of the radial artery.  PULM: No evidence of respiratory difficulty, symmetric chest rise. No audible abnormal respiratory sounds.  NEURO: Cranial nerves grossly intact.    Plan:    Proceed with procedure as planned    Sharifa Moreno  05/29/2023

## 2023-05-29 NOTE — DISCHARGE INSTRUCTIONS

## 2023-05-29 NOTE — OP NOTE
Cervical Transforaminal Epidural Steroid Injection under Fluoroscopic Guidance    The procedure, risks, benefits, and options were discussed with the patient. There are no contraindications to the procedure. The patent expressed understanding and agreed to the procedure. Informed written consent was obtained prior to the start of the procedure and can be found in the patient's chart.    PATIENT NAME: Vick Gonzalez   MRN: [id number]     DATE OF PROCEDURE: 05/29/2023    PROCEDURE: Left  C7/T1 Cervical Transforaminal Epidural Steroid Injection under Fluoroscopic Guidance    PRE-OP DIAGNOSIS: Cervical radiculopathy [M54.12] Cervical radiculopathy [M54.12]    POST-OP DIAGNOSIS: Same    PHYSICIAN: Thu Penn MD    ASSISTANTS: Sharifa Moreno MD [facility] Fellow     MEDICATIONS INJECTED: Preservative-free Decadron 10mg with 1cc of Lidocaine 1% MPF     LOCAL ANESTHETIC INJECTED: Xylocaine 2%     SEDATION: Versed 2mg and Fentanyl 50mcg                                                                                                                                                                                     Conscious sedation ordered by M.D. Patient re-evaluation prior to administration of conscious sedation. No changes noted in patient's status from initial evaluation. The patient's vital signs were monitored by RN and patient remained hemodynamically stable throughout the procedure.    Event Time In   Sedation Start 1038   Sedation End 1048       ESTIMATED BLOOD LOSS: None    COMPLICATIONS: None    TECHNIQUE: Time-out was performed to identify the patient and procedure to be performed. With the patient laying in the oblique position, the surgical area was prepped and draped in the usual sterile fashion using ChloraPrep and a fenestrated drape. The levels were determined under fluoroscopy guidance. Skin anesthesia was achieved by injecting Lidocaine 2% over the injection sites. The transforaminal spaces were then  approached with a 26 gauge, 3.5 inch spinal quinke needle that was introduced under fluoroscopic guidance with AP, lateral and/or contralateral oblique imaging. Once the needle tip was in the area of the transforaminal space, and there was no blood, CSF or paraesthesias, contrast dye Omnipaque (240mg/mL) was injected to confirm placement and there was no vascular runoff. Fluoroscopic imaging in the AP and lateral views revealed a clear outline of the spinal nerve with proximal spread of agent through the neural foramen into the epidural space. Then 2 mL of the medication mixture listed above was injected slowly at each site. Displacement of the radio opaque contrast after injection of the medication confirmed that the medication went into the area of the transforaminal spaces. The needles were removed and bleeding was nil. A sterile dressing was applied. No specimens collected. The patient tolerated the procedure well.       The patient was monitored after the procedure in the recovery area. They were given post-procedure and discharge instructions to follow at home. The patient was discharged in a stable condition.    PAIN BEFORE THE PROCEDURE: 10/10    PAIN AFTER THE PROCEDURE: 10/10      Thu Penn MD

## 2023-05-31 ENCOUNTER — OFFICE VISIT (OUTPATIENT)
Dept: CARDIOLOGY | Facility: CLINIC | Age: 68
End: 2023-05-31
Payer: MEDICARE

## 2023-05-31 ENCOUNTER — LAB VISIT (OUTPATIENT)
Dept: LAB | Facility: HOSPITAL | Age: 68
End: 2023-05-31
Payer: MEDICARE

## 2023-05-31 ENCOUNTER — CLINICAL SUPPORT (OUTPATIENT)
Dept: CARDIOLOGY | Facility: CLINIC | Age: 68
End: 2023-05-31
Payer: MEDICARE

## 2023-05-31 VITALS
HEIGHT: 74 IN | DIASTOLIC BLOOD PRESSURE: 58 MMHG | OXYGEN SATURATION: 96 % | SYSTOLIC BLOOD PRESSURE: 100 MMHG | WEIGHT: 257.13 LBS | HEART RATE: 78 BPM | BODY MASS INDEX: 33 KG/M2

## 2023-05-31 DIAGNOSIS — N18.30 STAGE 3 CHRONIC KIDNEY DISEASE, UNSPECIFIED WHETHER STAGE 3A OR 3B CKD: Chronic | ICD-10-CM

## 2023-05-31 DIAGNOSIS — E11.42 TYPE 2 DIABETES MELLITUS WITH DIABETIC POLYNEUROPATHY, WITH LONG-TERM CURRENT USE OF INSULIN: Chronic | ICD-10-CM

## 2023-05-31 DIAGNOSIS — I73.9 CLAUDICATION IN PERIPHERAL VASCULAR DISEASE: ICD-10-CM

## 2023-05-31 DIAGNOSIS — E11.69 HYPERLIPIDEMIA ASSOCIATED WITH TYPE 2 DIABETES MELLITUS: ICD-10-CM

## 2023-05-31 DIAGNOSIS — R06.02 SOB (SHORTNESS OF BREATH): ICD-10-CM

## 2023-05-31 DIAGNOSIS — E78.5 HYPERLIPIDEMIA ASSOCIATED WITH TYPE 2 DIABETES MELLITUS: ICD-10-CM

## 2023-05-31 DIAGNOSIS — Z94.4 S/P LIVER TRANSPLANT: Chronic | ICD-10-CM

## 2023-05-31 DIAGNOSIS — I50.42 CHRONIC COMBINED SYSTOLIC AND DIASTOLIC HEART FAILURE: Primary | ICD-10-CM

## 2023-05-31 DIAGNOSIS — I10 ESSENTIAL HYPERTENSION: ICD-10-CM

## 2023-05-31 DIAGNOSIS — D84.9 IMMUNOSUPPRESSED STATUS: ICD-10-CM

## 2023-05-31 DIAGNOSIS — Z79.4 TYPE 2 DIABETES MELLITUS WITH DIABETIC POLYNEUROPATHY, WITH LONG-TERM CURRENT USE OF INSULIN: Chronic | ICD-10-CM

## 2023-05-31 DIAGNOSIS — I50.30 HEART FAILURE WITH PRESERVED EJECTION FRACTION, UNSPECIFIED HF CHRONICITY: Primary | ICD-10-CM

## 2023-05-31 LAB
ALBUMIN SERPL BCP-MCNC: 4.1 G/DL (ref 3.5–5.2)
ALP SERPL-CCNC: 64 U/L (ref 55–135)
ALT SERPL W/O P-5'-P-CCNC: 30 U/L (ref 10–44)
ANION GAP SERPL CALC-SCNC: 10 MMOL/L (ref 8–16)
AST SERPL-CCNC: 19 U/L (ref 10–40)
BASOPHILS # BLD AUTO: 0.05 K/UL (ref 0–0.2)
BASOPHILS NFR BLD: 0.4 % (ref 0–1.9)
BILIRUB SERPL-MCNC: 0.4 MG/DL (ref 0.1–1)
BNP SERPL-MCNC: 11 PG/ML (ref 0–99)
BUN SERPL-MCNC: 39 MG/DL (ref 8–23)
CALCIUM SERPL-MCNC: 9.3 MG/DL (ref 8.7–10.5)
CHLORIDE SERPL-SCNC: 108 MMOL/L (ref 95–110)
CO2 SERPL-SCNC: 22 MMOL/L (ref 23–29)
CREAT SERPL-MCNC: 2.2 MG/DL (ref 0.5–1.4)
DIFFERENTIAL METHOD: ABNORMAL
EOSINOPHIL # BLD AUTO: 0.5 K/UL (ref 0–0.5)
EOSINOPHIL NFR BLD: 4.1 % (ref 0–8)
ERYTHROCYTE [DISTWIDTH] IN BLOOD BY AUTOMATED COUNT: 13.9 % (ref 11.5–14.5)
EST. GFR  (NO RACE VARIABLE): 32.4 ML/MIN/1.73 M^2
GLUCOSE SERPL-MCNC: 71 MG/DL (ref 70–110)
HCT VFR BLD AUTO: 37.6 % (ref 40–54)
HGB BLD-MCNC: 11.9 G/DL (ref 14–18)
IMM GRANULOCYTES # BLD AUTO: 0.11 K/UL (ref 0–0.04)
IMM GRANULOCYTES NFR BLD AUTO: 0.9 % (ref 0–0.5)
LYMPHOCYTES # BLD AUTO: 4.8 K/UL (ref 1–4.8)
LYMPHOCYTES NFR BLD: 39.7 % (ref 18–48)
MAGNESIUM SERPL-MCNC: 1.9 MG/DL (ref 1.6–2.6)
MCH RBC QN AUTO: 29 PG (ref 27–31)
MCHC RBC AUTO-ENTMCNC: 31.6 G/DL (ref 32–36)
MCV RBC AUTO: 92 FL (ref 82–98)
MONOCYTES # BLD AUTO: 1 K/UL (ref 0.3–1)
MONOCYTES NFR BLD: 8.2 % (ref 4–15)
NEUTROPHILS # BLD AUTO: 5.6 K/UL (ref 1.8–7.7)
NEUTROPHILS NFR BLD: 46.7 % (ref 38–73)
NRBC BLD-RTO: 0 /100 WBC
PHOSPHATE SERPL-MCNC: 4.1 MG/DL (ref 2.7–4.5)
PLATELET # BLD AUTO: 168 K/UL (ref 150–450)
PMV BLD AUTO: 13.3 FL (ref 9.2–12.9)
POTASSIUM SERPL-SCNC: 4.7 MMOL/L (ref 3.5–5.1)
PROT SERPL-MCNC: 7.5 G/DL (ref 6–8.4)
RBC # BLD AUTO: 4.11 M/UL (ref 4.6–6.2)
SODIUM SERPL-SCNC: 140 MMOL/L (ref 136–145)
WBC # BLD AUTO: 12.06 K/UL (ref 3.9–12.7)

## 2023-05-31 PROCEDURE — 3288F FALL RISK ASSESSMENT DOCD: CPT | Mod: CPTII,S$GLB,,

## 2023-05-31 PROCEDURE — 1125F AMNT PAIN NOTED PAIN PRSNT: CPT | Mod: CPTII,S$GLB,,

## 2023-05-31 PROCEDURE — 3044F PR MOST RECENT HEMOGLOBIN A1C LEVEL <7.0%: ICD-10-PCS | Mod: CPTII,S$GLB,,

## 2023-05-31 PROCEDURE — 3008F PR BODY MASS INDEX (BMI) DOCUMENTED: ICD-10-PCS | Mod: CPTII,S$GLB,,

## 2023-05-31 PROCEDURE — 99214 OFFICE O/P EST MOD 30 MIN: CPT | Mod: S$GLB,,,

## 2023-05-31 PROCEDURE — 1101F PT FALLS ASSESS-DOCD LE1/YR: CPT | Mod: CPTII,S$GLB,,

## 2023-05-31 PROCEDURE — 3074F PR MOST RECENT SYSTOLIC BLOOD PRESSURE < 130 MM HG: ICD-10-PCS | Mod: CPTII,S$GLB,,

## 2023-05-31 PROCEDURE — 99214 PR OFFICE/OUTPT VISIT, EST, LEVL IV, 30-39 MIN: ICD-10-PCS | Mod: S$GLB,,,

## 2023-05-31 PROCEDURE — 4010F PR ACE/ARB THEARPY RXD/TAKEN: ICD-10-PCS | Mod: CPTII,S$GLB,,

## 2023-05-31 PROCEDURE — 83735 ASSAY OF MAGNESIUM: CPT

## 2023-05-31 PROCEDURE — 3008F BODY MASS INDEX DOCD: CPT | Mod: CPTII,S$GLB,,

## 2023-05-31 PROCEDURE — 1111F DSCHRG MED/CURRENT MED MERGE: CPT | Mod: CPTII,S$GLB,,

## 2023-05-31 PROCEDURE — 4010F ACE/ARB THERAPY RXD/TAKEN: CPT | Mod: CPTII,S$GLB,,

## 2023-05-31 PROCEDURE — 1111F PR DISCHARGE MEDS RECONCILED W/ CURRENT OUTPATIENT MED LIST: ICD-10-PCS | Mod: CPTII,S$GLB,,

## 2023-05-31 PROCEDURE — 1159F PR MEDICATION LIST DOCUMENTED IN MEDICAL RECORD: ICD-10-PCS | Mod: CPTII,S$GLB,,

## 2023-05-31 PROCEDURE — 80053 COMPREHEN METABOLIC PANEL: CPT

## 2023-05-31 PROCEDURE — 3044F HG A1C LEVEL LT 7.0%: CPT | Mod: CPTII,S$GLB,,

## 2023-05-31 PROCEDURE — 1159F MED LIST DOCD IN RCRD: CPT | Mod: CPTII,S$GLB,,

## 2023-05-31 PROCEDURE — 3078F PR MOST RECENT DIASTOLIC BLOOD PRESSURE < 80 MM HG: ICD-10-PCS | Mod: CPTII,S$GLB,,

## 2023-05-31 PROCEDURE — 85025 COMPLETE CBC W/AUTO DIFF WBC: CPT

## 2023-05-31 PROCEDURE — 3288F PR FALLS RISK ASSESSMENT DOCUMENTED: ICD-10-PCS | Mod: CPTII,S$GLB,,

## 2023-05-31 PROCEDURE — 99999 PR PBB SHADOW E&M-EST. PATIENT-LVL III: CPT | Mod: PBBFAC,,,

## 2023-05-31 PROCEDURE — 83880 ASSAY OF NATRIURETIC PEPTIDE: CPT

## 2023-05-31 PROCEDURE — 84100 ASSAY OF PHOSPHORUS: CPT

## 2023-05-31 PROCEDURE — 1101F PR PT FALLS ASSESS DOC 0-1 FALLS W/OUT INJ PAST YR: ICD-10-PCS | Mod: CPTII,S$GLB,,

## 2023-05-31 PROCEDURE — 1125F PR PAIN SEVERITY QUANTIFIED, PAIN PRESENT: ICD-10-PCS | Mod: CPTII,S$GLB,,

## 2023-05-31 PROCEDURE — 99999 PR PBB SHADOW E&M-EST. PATIENT-LVL III: ICD-10-PCS | Mod: PBBFAC,,,

## 2023-05-31 PROCEDURE — 3078F DIAST BP <80 MM HG: CPT | Mod: CPTII,S$GLB,,

## 2023-05-31 PROCEDURE — 3074F SYST BP LT 130 MM HG: CPT | Mod: CPTII,S$GLB,,

## 2023-05-31 RX ORDER — LISINOPRIL 20 MG/1
20 TABLET ORAL DAILY
Qty: 90 TABLET | Refills: 3 | Status: SHIPPED | OUTPATIENT
Start: 2023-05-31 | End: 2023-07-24

## 2023-05-31 NOTE — PATIENT INSTRUCTIONS
Take lasix only as needed for worsening shortness of breath, swelling in legs, or 2-3lb weight gain in 1 day/5lb weight gain in 1 week    STOP lisinopril 40mg daily.    Start lisinopril 20mg daily.

## 2023-05-31 NOTE — PROGRESS NOTES
"[facility]) First Week Visit     Pt presents to clinic  [facility].    Most Recent Hospital Discharge Date:  May 21, 2023    Last admission Diagnosis/chief complaint: SOB        Visit Vitals:     Wt Readings from Last 3 Encounters:   05/31/23 116.6 kg (257 lb 1.6 oz)   05/29/23 119.7 kg (264 lb)   05/26/23 118.1 kg (260 lb 5.8 oz)     Temp Readings from Last 3 Encounters:   05/29/23 97.8 °F (36.6 °C) (Oral)   05/26/23 98.3 °F (36.8 °C) (Oral)   05/21/23 97.8 °F (36.6 °C) (Oral)     BP Readings from Last 3 Encounters:   05/31/23 (!) 100/58   05/29/23 (!) 142/76   05/26/23 122/74     Pulse Readings from Last 3 Encounters:   05/31/23 78   05/29/23 98   05/26/23 73            Pt reports the following:  []  Shortness of Breath with activity  []  Shortness of Breath at rest/ sleeping on 2-3 pillows "some days"  []  Fatigue  [x]  Edema   [] Chest pain or tightness  [] Weight Increase since discharge  [] None of the above    Pt reports being in the GREEN Zone. If in yellow/red, reminded that they should be calling [facility] today or now.      Medications:   Medication reconciliation completed today per MA.  Pt reports having all medications available and understands how to take them appropriately. Reminded pt to call prior to making any changes to medications.      Education:    [x] Gave pt new pt has  "[facility] Home Care Guide" .   Reviewed key points as listed below.      Recommend 2 gram sodium restriction and 1500cc fluid restriction.  Encourage physical activity with graded exercise program.  Requested patient to weigh themselves daily, and to notify us if their weight increases by more than 3 lbs in 1 day or 5 lbs in 3 days.      [x] Gave and Reviewed "Daily Weight and Symptom Tracker".  Reviewed with patient when and how to call  [facility] according to "Yellow Zone" and "Red Zone".                  [] Pt given list of low/high sodium food list                 " "  Watch for these Signs and Symptoms: If any of these occur, contact [facility] immediately:   Increase in shortness of breath with movement   Increase in swelling in your legs and ankles   Weight gain of more than 3 pounds in a night or 5 pounds in 3 days.   Difficulty breathing when you are lying down   Worsening fatigue or tiredness   Stomach bloating, a full feeling or a loss of appetite   Increased coughing--especially when you are lying down     MyChart and Care Companion:              Patient active on myChart? Yes, patient uses regularly.    Contacting our Team:              Reviewed with pt how to contact [facility] RN via phone or [place] messaging.      [facility] Program Plan:  Pt educated on follow-up plan while in [facility] program.   [x]  PT given /reviewed upcoming appointment/check in dates. These will include weekly contact with RN or visits with providers over the next 4-6 weeks.                   [x]  Pt educated that they will transitioned to long term care provider team at week 4-6.  This team will be either Cardiology, PCP or Advanced Heart Failure depending on needs.          Pt was able to verbalize back to RN in their own words correct diet/fluid restrictions, necessity for exercise, warning signs and symptoms, when and how to contact their TCC team .       Plan:     Per Epic note.       [x]  Pt given AVS with follow up appointment within 1.5 week and medication detail list for ease of use.     [x]  Explained to pt they will have a phone "check in" by RN in/on June 7, 2023.      [] Pt met with Lloyd Reyez Dietician today after visit.  Refer to his note for details.     Will follow up with pt at next clinic visit and RN navigator available for pt questions, issues or concerns.     Please refer to provider visit for additional details and assessment.    "

## 2023-06-03 NOTE — ASSESSMENT & PLAN NOTE
Patient is identified as having Unknown presumed DIASTOLIC CHF - ECHO IS PENDING heart failure that is Acute. CHF is currently uncontrolled due to Continued edema of extremities and Pulmonary edema/pleural effusion on CXR.  Continue Beta Blocker, ACE/ARB, Furosemide and Nitrate/Vasodilator and monitor clinical status closely. Monitor on telemetry. Patient is on CHF pathway.  Monitor strict Is&Os and daily weights.  Place on fluid restriction of 1.5 L. Continue to stress to patient importance of self efficacy and  on diet for CHF. Last BNP reviewed- and noted below   Recent Labs   Lab 05/31/23  1258   BNP 11   - Keep on Tele monitoring  - TTE performed on 5/15, EF 60% with CVP of around 8  - Hold diuresis today  - Increase hydralazine to 50mg TID and continue ISDN 20mg TID  - Continue metoprolol  - Continue Lisinopril 40mg po daily   - pricecheck sglt2 on discharge

## 2023-06-03 NOTE — DISCHARGE SUMMARY
[facility] - Cardiology [facility] Medicine  Discharge Summary      Patient Name: Vick Gonzalez  MRN: [id number]  CORDELIA: [id number]  Patient Class: IP- Inpatient  Admission Date: 5/12/2023  Hospital Length of Stay: 8 days  Discharge Date and Time: 5/21/23  Attending Physician: No att. providers found   Discharging Provider: Velasquez Navarrete MD  Primary Care Provider: Emanuel Lopez MD  Hospital Medicine Team: [facility] Velasquez Navarrete MD  Primary Care Team: [facility]    HPI:   67-year-old  man with a history of alcoholic liver disease status post liver transplant, HCV status post viral treatment, type 2 diabetes on insulin hypertension, gout, hypothyroidism and PAD presents the emergency department with complaints shortness of breath and peripheral edema.    He reports that over.  Week he has developed worsening progressive lower extremity edema and has noted intermittent dyspnea on exertion and having some bouts acute dyspnea that occurred her randomly at rest.  He denies chest pain at this time , no chest pressure.   No reported his of CAD or heart failure in the past.  He states prior to his liver transplant that hes experienced peripheral edema.   He reports adherence to home medications/antihypertensive regimen.  Blood pressure elevated on vitals today.  He also feels increased abdominal girth.     He is non-smoker, no recent alcohol use, no drug use.  He reports no increased fluid intake, but diet discussion states his son has been working at [employer] and bringing home fried catfish dinner plate frequently over recent weeks. Patient later eating a taco his wife brought him after my initial visit, has had increased sodium intake reported.     ED Treatment: Lasix 40mg IV x 1 - Lidocaine patch, oxycodone 5mg.             Hospital Course:   Patient admitted to hospital medicine. Diuersing well. EF 60%, CVP around 8. Holding diuretics  on 5/15 given concern of overdiuresis.   However, may be a component of cardiorenal as his urine studies demonstrated pre-renal etiology and remains fluid overloaded. Resume diuresis with improvement.    Patient deemed ready for discharge. Plan discussed with pt, who was agreeable and amenable; medications were discussed and reviewed, outpatient follow-up arranged, ER precautions were given, all questions were answered to the pt's satisfaction, and Vick Gonzalez  was subsequently discharged.          Cardiac/Vascular  * Acute congestive heart failure  Patient is identified as having Unknown presumed DIASTOLIC CHF - ECHO IS PENDING heart failure that is Acute. CHF is currently uncontrolled due to Continued edema of extremities and Pulmonary edema/pleural effusion on CXR.  Continue Beta Blocker, ACE/ARB, Furosemide and Nitrate/Vasodilator and monitor clinical status closely. Monitor on telemetry. Patient is on CHF pathway.  Monitor strict Is&Os and daily weights.  Place on fluid restriction of 1.5 L. Continue to stress to patient importance of self efficacy and  on diet for CHF. Last BNP reviewed- and noted below   Recent Labs   Lab 05/31/23  1258   BNP 11   - Keep on Tele monitoring  - TTE performed on 5/15, EF 60% with CVP of around 8  - Hold diuresis today  - Increase hydralazine to 50mg TID and continue ISDN 20mg TID  - Continue metoprolol  - Continue Lisinopril 40mg po daily   - pricecheck sglt2 on discharge        Final Active Diagnoses:    Diagnosis Date Noted POA    PRINCIPAL PROBLEM:  Acute congestive heart failure [I50.9] 05/12/2023 Yes    Hypomagnesemia [E83.42] 05/13/2023 Yes    Diabetic polyneuropathy associated with type 2 diabetes mellitus [E11.42] 12/01/2020 Yes    PAD (peripheral artery disease) [I73.9] 08/30/2017 Yes     Chronic    CKD (chronic kidney disease), stage III [N18.30]  Yes     Chronic    Type 2 diabetes mellitus with diabetic polyneuropathy, with long-term current use of  insulin [E11.42, Z79.4] 10/04/2016 Not Applicable     Chronic    S/P liver transplant [Z94.4] 10/04/2016 Yes     Chronic    OSMANY (acute kidney injury) [N17.9] 10/04/2016 No    Essential hypertension [I10] 06/19/2015 Yes    Acquired hypothyroidism [E03.9]  Yes     Chronic    Gout, unspecified [M10.9] 03/14/2011 Yes      Problems Resolved During this Admission:       Discharged Condition: good    Disposition: Home or Self Care    Follow Up:    Patient Instructions:      Notify your health care provider if you experience any of the following:  temperature >100.4     Notify your health care provider if you experience any of the following:  persistent nausea and vomiting or diarrhea     Notify your health care provider if you experience any of the following:  severe uncontrolled pain     Notify your health care provider if you experience any of the following:  difficulty breathing or increased cough     Notify your health care provider if you experience any of the following:  severe persistent headache     Notify your health care provider if you experience any of the following:  worsening rash     Notify your health care provider if you experience any of the following:  persistent dizziness, light-headedness, or visual disturbances     Notify your health care provider if you experience any of the following:  increased confusion or weakness     Activity as tolerated         Pending Diagnostic Studies:     None         Medications:  Reconciled Home Medications:      Medication List      START taking these medications    furosemide 20 MG tablet  Commonly known as: LASIX  Take 2 tablets (40 mg total) by mouth daily as needed (For Weight Gain > 2-3 lbs in 1 day or 4-6 lbs over 1 week notify PCP and take 40 mg daily for three days).     isosorbide-hydrALAZINE 20-37.5 mg 20-37.5 mg Tab  Commonly known as: BIDIL  Take 1 tablet by mouth 3 (three) times daily.        CONTINUE taking these medications    allopurinoL 100 MG  tablet  Commonly known as: ZYLOPRIM  TAKE 1 TABLET BY MOUTH TWICE A DAY     aluminum-magnesium hydroxide-simethicone 200-200-20 mg/5 mL Susp  Commonly known as: MAALOX  Take 30 mLs by mouth 4 (four) times daily before meals and nightly.     aspirin 81 MG Chew  Take 1 tablet (81 mg total) by mouth once daily.     blood sugar diagnostic Strp  Commonly known as: TRUE METRIX GLUCOSE TEST STRIP  USE 3 TIMES DAILY TO TEST BLOOD GLUCOSE LEVEL     blood-glucose meter Misc  1 Device by Misc.(Non-Drug; Combo Route) route 3 (three) times daily.     calcium carbonate-vitamin D3 600 mg-10 mcg (400 unit) Chew  Commonly known as: CALCIUM 600 WITH VITAMIN D3  Take 1 tablet by mouth once daily.     clotrimazole 1 % cream  Commonly known as: LOTRIMIN  APPLY TOPICALLY TWICE A DAY     CYANOCOBALAMIN (VITAMIN B-12) ORAL  Chew 1 tablet by mouth 2 to 3 times a month.     DEXCOM G6 SENSOR Viviane  Generic drug: blood-glucose sensor  Use as directed     DEXCOM G6 TRANSMITTER Viviane  Generic drug: blood-glucose transmitter  Use as directed     diphenhydrAMINE 25 mg capsule  Commonly known as: BENADRYL  Take 25 mg by mouth daily as needed for Allergies (Cold).     gabapentin 800 MG tablet  Commonly known as: NEURONTIN  Take 1 tablet (800 mg total) by mouth 3 (three) times daily.     insulin lispro 100 unit/mL pen  Commonly known as: HumaLOG KwikPen Insulin  Inject 20 Units into the skin 3 (three) times daily with meals.     lancets 30 gauge Misc  1 lancet by Misc.(Non-Drug; Combo Route) route 4 (four) times daily before meals and nightly.     levothyroxine 88 MCG tablet  Commonly known as: SYNTHROID  TAKE 1 TABLET BY MOUTH EVERY DAY     metoprolol succinate 200 MG 24 hr tablet  Commonly known as: TOPROL-XL  TAKE 1 TABLET BY MOUTH EVERY DAY     multivitamin per tablet  Commonly known as: THERAGRAN  Take 1 tablet by mouth once daily.     NIFEdipine 90 MG (OSM) 24 hr tablet  Commonly known as: PROCARDIA-XL  Take 1 tablet (90 mg total) by mouth once  "daily.     NOVOFINE PLUS 32 gauge x 1/6" Ndle  Generic drug: pen needle, diabetic     rosuvastatin 5 MG tablet  Commonly known as: CRESTOR  TAKE 1 TABLET BY MOUTH EVERY DAY     tacrolimus 1 MG Cap  Commonly known as: PROGRAF  Take 2 capsules (2 mg total) by mouth every morning AND 1 capsule (1 mg total) every evening.     TOUJEO MAX U-300 SOLOSTAR 300 unit/mL (3 mL) insulin pen  Generic drug: insulin glargine U-300 conc  Inject 40 Units into the skin once daily.     traZODone 50 MG tablet  Commonly known as: DESYREL  TAKE 1 TABLET BY MOUTH EVERY EVENING.        STOP taking these medications    hydrALAZINE 25 MG tablet  Commonly known as: APRESOLINE     ibuprofen 200 MG tablet  Commonly known as: ADVIL,MOTRIN     lisinopriL 40 MG tablet  Commonly known as: PRINIVIL,ZESTRIL            Indwelling Lines/Drains at time of discharge:   Lines/Drains/Airways     None                 Time spent on the discharge of patient: 35 minutes         Velasquez Navarrete MD  Department of Hospital Medicine  [facility] - Cardiology Stepdown  "

## 2023-06-06 ENCOUNTER — TELEPHONE (OUTPATIENT)
Dept: PAIN MEDICINE | Facility: CLINIC | Age: 68
End: 2023-06-06
Payer: MEDICARE

## 2023-06-06 NOTE — TELEPHONE ENCOUNTER
----- Message from Deepthi Pinedo sent at 6/6/2023 12:52 PM CDT -----  Regarding: injection  Name of caller:brenda       What is the requesting detail: pt states the injections didn't work and he is still experiencing pain, please give him a call back to further advise       Can the clinic reply by [facility]:       What number to call back:[phone]

## 2023-06-09 ENCOUNTER — TELEPHONE (OUTPATIENT)
Dept: PAIN MEDICINE | Facility: CLINIC | Age: 68
End: 2023-06-09
Payer: MEDICARE

## 2023-06-09 NOTE — TELEPHONE ENCOUNTER
----- Message from Deepthi Pinedo sent at 6/9/2023  3:30 PM CDT -----  Regarding: call back  Type:  Patient Returning Call    Who Called:brenda     Who Left Message for Patient:unknown     Does the patient know what this is regarding?:yes to schedule an appt     Would the patient rather a call back or a response via [facility]? Call    Best Call Back Number:[phone]     Additional Information:

## 2023-06-20 ENCOUNTER — OFFICE VISIT (OUTPATIENT)
Dept: CARDIOLOGY | Facility: CLINIC | Age: 68
End: 2023-06-20
Payer: MEDICARE

## 2023-06-20 ENCOUNTER — DOCUMENTATION ONLY (OUTPATIENT)
Dept: CARDIOLOGY | Facility: CLINIC | Age: 68
End: 2023-06-20

## 2023-06-20 ENCOUNTER — LAB VISIT (OUTPATIENT)
Dept: LAB | Facility: HOSPITAL | Age: 68
End: 2023-06-20
Payer: MEDICARE

## 2023-06-20 ENCOUNTER — EDUCATION (OUTPATIENT)
Dept: TRANSPLANT | Facility: CLINIC | Age: 68
End: 2023-06-20
Payer: MEDICARE

## 2023-06-20 VITALS
BODY MASS INDEX: 33.54 KG/M2 | OXYGEN SATURATION: 96 % | SYSTOLIC BLOOD PRESSURE: 145 MMHG | HEIGHT: 74 IN | WEIGHT: 261.31 LBS | DIASTOLIC BLOOD PRESSURE: 76 MMHG | HEART RATE: 75 BPM

## 2023-06-20 DIAGNOSIS — I10 ESSENTIAL HYPERTENSION: ICD-10-CM

## 2023-06-20 DIAGNOSIS — D84.9 IMMUNOSUPPRESSED STATUS: ICD-10-CM

## 2023-06-20 DIAGNOSIS — I73.9 CLAUDICATION IN PERIPHERAL VASCULAR DISEASE: ICD-10-CM

## 2023-06-20 DIAGNOSIS — E78.5 HYPERLIPIDEMIA ASSOCIATED WITH TYPE 2 DIABETES MELLITUS: ICD-10-CM

## 2023-06-20 DIAGNOSIS — R06.02 SOB (SHORTNESS OF BREATH): ICD-10-CM

## 2023-06-20 DIAGNOSIS — E11.42 TYPE 2 DIABETES MELLITUS WITH DIABETIC POLYNEUROPATHY, WITH LONG-TERM CURRENT USE OF INSULIN: ICD-10-CM

## 2023-06-20 DIAGNOSIS — N18.30 STAGE 3 CHRONIC KIDNEY DISEASE, UNSPECIFIED WHETHER STAGE 3A OR 3B CKD: ICD-10-CM

## 2023-06-20 DIAGNOSIS — I50.30 HEART FAILURE WITH PRESERVED EJECTION FRACTION, UNSPECIFIED HF CHRONICITY: Primary | ICD-10-CM

## 2023-06-20 DIAGNOSIS — Z94.4 S/P LIVER TRANSPLANT: ICD-10-CM

## 2023-06-20 DIAGNOSIS — Z79.4 TYPE 2 DIABETES MELLITUS WITH DIABETIC POLYNEUROPATHY, WITH LONG-TERM CURRENT USE OF INSULIN: ICD-10-CM

## 2023-06-20 DIAGNOSIS — E11.69 HYPERLIPIDEMIA ASSOCIATED WITH TYPE 2 DIABETES MELLITUS: ICD-10-CM

## 2023-06-20 LAB
ALBUMIN SERPL BCP-MCNC: 3.9 G/DL (ref 3.5–5.2)
ALP SERPL-CCNC: 71 U/L (ref 55–135)
ALT SERPL W/O P-5'-P-CCNC: 18 U/L (ref 10–44)
ANION GAP SERPL CALC-SCNC: 9 MMOL/L (ref 8–16)
AST SERPL-CCNC: 18 U/L (ref 10–40)
BILIRUB SERPL-MCNC: 0.3 MG/DL (ref 0.1–1)
BNP SERPL-MCNC: 29 PG/ML (ref 0–99)
BUN SERPL-MCNC: 23 MG/DL (ref 8–23)
CALCIUM SERPL-MCNC: 9.4 MG/DL (ref 8.7–10.5)
CHLORIDE SERPL-SCNC: 109 MMOL/L (ref 95–110)
CO2 SERPL-SCNC: 22 MMOL/L (ref 23–29)
CREAT SERPL-MCNC: 1.3 MG/DL (ref 0.5–1.4)
EST. GFR  (NO RACE VARIABLE): >60 ML/MIN/1.73 M^2
GLUCOSE SERPL-MCNC: 129 MG/DL (ref 70–110)
MAGNESIUM SERPL-MCNC: 1.7 MG/DL (ref 1.6–2.6)
PHOSPHATE SERPL-MCNC: 5.9 MG/DL (ref 2.7–4.5)
POTASSIUM SERPL-SCNC: 4.5 MMOL/L (ref 3.5–5.1)
PROT SERPL-MCNC: 7.4 G/DL (ref 6–8.4)
SODIUM SERPL-SCNC: 140 MMOL/L (ref 136–145)

## 2023-06-20 PROCEDURE — 36415 COLL VENOUS BLD VENIPUNCTURE: CPT

## 2023-06-20 PROCEDURE — 99214 PR OFFICE/OUTPT VISIT, EST, LEVL IV, 30-39 MIN: ICD-10-PCS | Mod: S$GLB,,,

## 2023-06-20 PROCEDURE — 1160F PR REVIEW ALL MEDS BY PRESCRIBER/CLIN PHARMACIST DOCUMENTED: ICD-10-PCS | Mod: CPTII,S$GLB,,

## 2023-06-20 PROCEDURE — 99214 OFFICE O/P EST MOD 30 MIN: CPT | Mod: S$GLB,,,

## 2023-06-20 PROCEDURE — 1125F PR PAIN SEVERITY QUANTIFIED, PAIN PRESENT: ICD-10-PCS | Mod: CPTII,S$GLB,,

## 2023-06-20 PROCEDURE — 83880 ASSAY OF NATRIURETIC PEPTIDE: CPT

## 2023-06-20 PROCEDURE — 3008F PR BODY MASS INDEX (BMI) DOCUMENTED: ICD-10-PCS | Mod: CPTII,S$GLB,,

## 2023-06-20 PROCEDURE — 3044F HG A1C LEVEL LT 7.0%: CPT | Mod: CPTII,S$GLB,,

## 2023-06-20 PROCEDURE — 3077F PR MOST RECENT SYSTOLIC BLOOD PRESSURE >= 140 MM HG: ICD-10-PCS | Mod: CPTII,S$GLB,,

## 2023-06-20 PROCEDURE — 1111F DSCHRG MED/CURRENT MED MERGE: CPT | Mod: CPTII,S$GLB,,

## 2023-06-20 PROCEDURE — 1125F AMNT PAIN NOTED PAIN PRSNT: CPT | Mod: CPTII,S$GLB,,

## 2023-06-20 PROCEDURE — 1101F PR PT FALLS ASSESS DOC 0-1 FALLS W/OUT INJ PAST YR: ICD-10-PCS | Mod: CPTII,S$GLB,,

## 2023-06-20 PROCEDURE — 3008F BODY MASS INDEX DOCD: CPT | Mod: CPTII,S$GLB,,

## 2023-06-20 PROCEDURE — 3044F PR MOST RECENT HEMOGLOBIN A1C LEVEL <7.0%: ICD-10-PCS | Mod: CPTII,S$GLB,,

## 2023-06-20 PROCEDURE — 84100 ASSAY OF PHOSPHORUS: CPT

## 2023-06-20 PROCEDURE — 1101F PT FALLS ASSESS-DOCD LE1/YR: CPT | Mod: CPTII,S$GLB,,

## 2023-06-20 PROCEDURE — 83735 ASSAY OF MAGNESIUM: CPT

## 2023-06-20 PROCEDURE — 4010F PR ACE/ARB THEARPY RXD/TAKEN: ICD-10-PCS | Mod: CPTII,S$GLB,,

## 2023-06-20 PROCEDURE — 80053 COMPREHEN METABOLIC PANEL: CPT

## 2023-06-20 PROCEDURE — 3288F FALL RISK ASSESSMENT DOCD: CPT | Mod: CPTII,S$GLB,,

## 2023-06-20 PROCEDURE — 3288F PR FALLS RISK ASSESSMENT DOCUMENTED: ICD-10-PCS | Mod: CPTII,S$GLB,,

## 2023-06-20 PROCEDURE — 1160F RVW MEDS BY RX/DR IN RCRD: CPT | Mod: CPTII,S$GLB,,

## 2023-06-20 PROCEDURE — 99999 PR PBB SHADOW E&M-EST. PATIENT-LVL III: CPT | Mod: PBBFAC,,,

## 2023-06-20 PROCEDURE — 4010F ACE/ARB THERAPY RXD/TAKEN: CPT | Mod: CPTII,S$GLB,,

## 2023-06-20 PROCEDURE — 1159F MED LIST DOCD IN RCRD: CPT | Mod: CPTII,S$GLB,,

## 2023-06-20 PROCEDURE — 3077F SYST BP >= 140 MM HG: CPT | Mod: CPTII,S$GLB,,

## 2023-06-20 PROCEDURE — 3078F PR MOST RECENT DIASTOLIC BLOOD PRESSURE < 80 MM HG: ICD-10-PCS | Mod: CPTII,S$GLB,,

## 2023-06-20 PROCEDURE — 3078F DIAST BP <80 MM HG: CPT | Mod: CPTII,S$GLB,,

## 2023-06-20 PROCEDURE — 1111F PR DISCHARGE MEDS RECONCILED W/ CURRENT OUTPATIENT MED LIST: ICD-10-PCS | Mod: CPTII,S$GLB,,

## 2023-06-20 PROCEDURE — 1159F PR MEDICATION LIST DOCUMENTED IN MEDICAL RECORD: ICD-10-PCS | Mod: CPTII,S$GLB,,

## 2023-06-20 PROCEDURE — 99999 PR PBB SHADOW E&M-EST. PATIENT-LVL III: ICD-10-PCS | Mod: PBBFAC,,,

## 2023-06-20 RX ORDER — NIFEDIPINE 30 MG/1
30 TABLET, EXTENDED RELEASE ORAL DAILY
Qty: 30 TABLET | Refills: 11 | Status: SHIPPED | OUTPATIENT
Start: 2023-06-20 | End: 2023-07-31

## 2023-06-20 NOTE — PROGRESS NOTES
2:10 pm Spoke with Pt at this visit to Make assess knowledge base of HFTCC guidelines for care . Pt has a full understanding of  Daily Dry Weights and how to compare them to monitor for Fluid gain. Patient states he cooks his own food with fresh or frozen ingredients so he can control the salt content. Pt has stopped drinking any Monster drinks or Rip It drinks. Pt states that he is doing well with his fluid balance. I tel him it's been a pleasure speaking with him.

## 2023-06-20 NOTE — PROGRESS NOTES
Met with pt to discuss 2g Na diet and 1500 ml fluid restriction      Pt states ok gutierrez, no n/v/d/c. Problem swallowing after surgery in 08/2022. Documented in discharge summary      Pt has 1 meal per day and is usually a tomato and cucumber salad. Pt may snack on cornflakes during lunch. Pt drinks 2x 16.9oz of water and 1 glass of oj or apple juice.        Pt was educated on how to read nutrition labels to understand food has natural sodium present. Recc'd of 800mg Na per meal. Pt recc drink no more than 1500ml / 50oz. Pt given nutrition handout and contact info if needs arise. Will follow up in H[facility] as needed

## 2023-06-21 ENCOUNTER — PATIENT MESSAGE (OUTPATIENT)
Dept: ADMINISTRATIVE | Facility: HOSPITAL | Age: 68
End: 2023-06-21
Payer: MEDICARE

## 2023-06-23 ENCOUNTER — TELEPHONE (OUTPATIENT)
Dept: FAMILY MEDICINE | Facility: CLINIC | Age: 68
End: 2023-06-23
Payer: MEDICARE

## 2023-06-23 NOTE — TELEPHONE ENCOUNTER
----- Message from Scott Haq sent at 6/23/2023  8:53 AM CDT -----  Contact: pt  Type: Requesting to speak with nurse        Who Called: PT  Regarding: requesting testosterone pills  Would the patient rather a call back or a response via [facility]? Call back  Best Call Back Number: [phone]  Additional Information:

## 2023-06-27 ENCOUNTER — TELEPHONE (OUTPATIENT)
Dept: CARDIOLOGY | Facility: CLINIC | Age: 68
End: 2023-06-27
Payer: MEDICARE

## 2023-06-27 NOTE — TELEPHONE ENCOUNTER
"[facility]) weekly phone follow up / triage call completed.     TCC RN Navigator spoke with Pt    Current Patient reported weight: No scale   Patient Goal Weight: (255 lbs)  Recent Patient reported blood pressure and heart rate:  Did not check BP/P today or yesterday    Pt reports the following:  []  Shortness of Breath with Activity  []  Shortness of Breath at rest   []  Fatigue  []  Edema less than in Hospital and getting better  [] Chest pain or tightness  [] Weight Increase since discharge  [x] None of the above    Pt reports using "Daily weight and symptom tracker".    Pt reports being in the Green(color) Zone. If in yellow/red, reminded that they should be calling HFTCC today or now.     Medications:   Medication compliance reviewed with pt.  Pt reports having medication list available and has all medications at home for use per list.     Education:   Confirmed pt still has "[facility] Home Care Guide"  .Has Booklet     Reminded of key points as listed below.     Recommend 2 -3 gram sodium restriction and 1500 cc-2000 cc fluid restriction. Pt states keeping to these restrictions  Encourage physical activity with graded exercise program.  Requested patient to weigh themselves daily, and to notify us if their weight increases by more than 3 lbs in 1 day or 5 lbs in 3 days.   Reminded to use "Daily weight and symptom tracker".  Even if pt does not have a scale, to use symptom tracker.   Pt states that he will come and  the scale.      Watch for these Signs and Symptoms: If any of these occur, contact HFTCC immediately:   Increase in shortness of breath with movement   Increase in swelling in your legs and ankles   Weight gain of more than 3 pounds in a day or 5 pounds in 3 days.   Difficulty breathing when you are lying down   Worsening fatigue or tiredness   Stomach bloating, a full feeling or a loss of appetite   Increased " coughing--especially when you are lying down      Pt was able to verbalize back to RN in their own words correct diet/fluid restrictions, necessity for exercise, warning signs and symptoms, when and how to contact their HFTCC team.      Pt reminded of upcoming appointment.  PT reports they will attend. Pt has no upcoming appt. And doesn't feel the need to be seen      Pt reminded of how and when to contact [facility]:  [phone] (Mon-Fri, 8a-5p) & for urgent issues on the weekend to page the Heart Transplant MD on call.  Pt also encouraged utilize [facility] messaging as well.      Pt  verbalized understanding and in agreement of plan.       Will follow up with pt at next clinic visit and RN navigator available for pt questions, issues or concerns.     Phone check in to be scheduled for 1 week from today.

## 2023-06-27 NOTE — TELEPHONE ENCOUNTER
"[facility]    Attempted to call pt to complete 1 week "check in" call. Unable to reach pt at listed phone numbers.  Was able to leave message on voicemail encouraging pt to return call with [facility] phone number..     Will continue to try to reach patient.    Request that wife have Pt call me.  "

## 2023-06-27 NOTE — TELEPHONE ENCOUNTER
"[facility]    Attempted to call pt to complete 1 week "check in" call. Unable to reach pt at listed phone numbers.  Was unable to leave message with family nor on voicemail. Voicemail not set up or full. .     Will continue to try to reach patient.    "

## 2023-07-11 ENCOUNTER — TELEPHONE (OUTPATIENT)
Dept: CARDIOLOGY | Facility: CLINIC | Age: 68
End: 2023-07-11
Payer: MEDICARE

## 2023-07-11 DIAGNOSIS — I50.30 HEART FAILURE WITH PRESERVED EJECTION FRACTION, UNSPECIFIED HF CHRONICITY: Primary | ICD-10-CM

## 2023-07-11 NOTE — TELEPHONE ENCOUNTER
"[facility]) DISCHARGE VISIT - PHONE     Called and spoke to PT    Most Recent Hospital Discharge Date:  5-  Last admission Diagnosis/chief complaint: SOB    Pt discharge completed by phone related to Transportation Difficulties      Pt reports the following:  [x]  Shortness of Breath with activity Has to use his Inhaler if he walks a lot  []  Shortness of Breath at rest   []  Fatigue  [x]  Edema Takes his PRN Lasix when he has edema  [] Chest pain or tightness  [] Weight Increase since discharge  [] None of the above    Medications:   Medication reconciliation completed today per RN.  Pt reports having all medications available and understands how to take them appropriately. Reminded pt to call prior to making any changes to medications.     Education:   [x] Confirmed pt still has  "[facility] Home Care Guide" .   Reviewed key points as listed below.     Recommend 2 gram sodium restriction and 1500cc fluid restriction.  Encourage physical activity with graded exercise program.  Requested patient to weigh themselves daily, and to notify us if their weight increases by more than 3 lbs in 1 day or 5 lbs in 3 days.     [x] Reviewed completed "Daily Weight and Symptom Tracker".  Reviewed with patient when and how to call HFTCC according to "Yellow Zone" and "Red Zone".       Watch for these Signs and Symptoms: If any of these occur, contact HFTCC immediately:   Increase in shortness of breath with movement   Increase in swelling in your legs and ankles   Weight gain of more than 3 pounds in a night or 5 pounds in 3 days.   Difficulty breathing when you are lying down   Worsening fatigue or tiredness   Stomach bloating, a full feeling or a loss of appetite   Increased coughing--especially when you are lying down    MyChart and Care Companion:   Patient active on myChart? No, pt set up with myChart today and pt educated on how to use.  Pt also educated " on myChart care companion as well.      HF TCC Program Plan:  Pt has successfully completed HFTCC program.  Pt care to be transferred to PT needs a Cardiology Referral for long term care.     Pt educated on how to call their offices and how to call [facility] On call in the event of an after hour issue.    PT reminded to continue to follow recommendations made during the HFT[facility] program to include monitoring daily weights, taking medications according to list, following up to appointments per provider recommendations, stop smoking/ start exercising and following a heart friendly low salt, low fluid diet.      Pt was able to verbalize back to RN in their own words correct diet/fluid restrictions, necessity for exercise, warning signs and symptoms, when and how to contact their  Long term care team .      Plan:   Certificate of Completion to be given to wife who works at [facility]. Pt is without a General Cardiologist, referral to be sent      [x]  Discussed upcoming appointments and/or plan for follow-up care with his/her PCP/Cardiology  PT aware of referral to be placed.    Electronic hand off completed : To be made available via Epic System Notes by routing epic note per Randi Washburn PA-C.     Please refer to provider note for additional details and assessment.    Pt asks if he can call us if he has questions and I tell him of course but his Cardiologist will be the MD who handles his medications from now on.

## 2023-07-24 ENCOUNTER — LAB VISIT (OUTPATIENT)
Dept: LAB | Facility: HOSPITAL | Age: 68
End: 2023-07-24
Attending: INTERNAL MEDICINE
Payer: MEDICARE

## 2023-07-24 ENCOUNTER — OFFICE VISIT (OUTPATIENT)
Dept: CARDIOLOGY | Facility: CLINIC | Age: 68
End: 2023-07-24
Payer: MEDICARE

## 2023-07-24 VITALS
DIASTOLIC BLOOD PRESSURE: 76 MMHG | HEART RATE: 76 BPM | BODY MASS INDEX: 34.61 KG/M2 | HEIGHT: 73 IN | WEIGHT: 261.13 LBS | SYSTOLIC BLOOD PRESSURE: 122 MMHG

## 2023-07-24 DIAGNOSIS — E78.5 HYPERLIPIDEMIA ASSOCIATED WITH TYPE 2 DIABETES MELLITUS: ICD-10-CM

## 2023-07-24 DIAGNOSIS — I50.32 CHRONIC HEART FAILURE WITH PRESERVED EJECTION FRACTION: Primary | ICD-10-CM

## 2023-07-24 DIAGNOSIS — E11.69 HYPERLIPIDEMIA ASSOCIATED WITH TYPE 2 DIABETES MELLITUS: ICD-10-CM

## 2023-07-24 DIAGNOSIS — N17.9 ACUTE RENAL FAILURE, UNSPECIFIED ACUTE RENAL FAILURE TYPE: ICD-10-CM

## 2023-07-24 DIAGNOSIS — Z94.4 S/P LIVER TRANSPLANT: ICD-10-CM

## 2023-07-24 DIAGNOSIS — I50.32 CHRONIC HEART FAILURE WITH PRESERVED EJECTION FRACTION: ICD-10-CM

## 2023-07-24 LAB
ANION GAP SERPL CALC-SCNC: 11 MMOL/L (ref 8–16)
BUN SERPL-MCNC: 18 MG/DL (ref 8–23)
CALCIUM SERPL-MCNC: 9.4 MG/DL (ref 8.7–10.5)
CHLORIDE SERPL-SCNC: 107 MMOL/L (ref 95–110)
CO2 SERPL-SCNC: 18 MMOL/L (ref 23–29)
CREAT SERPL-MCNC: 1.3 MG/DL (ref 0.5–1.4)
EST. GFR  (NO RACE VARIABLE): >60 ML/MIN/1.73 M^2
GLUCOSE SERPL-MCNC: 201 MG/DL (ref 70–110)
POTASSIUM SERPL-SCNC: 4.8 MMOL/L (ref 3.5–5.1)
SODIUM SERPL-SCNC: 136 MMOL/L (ref 136–145)

## 2023-07-24 PROCEDURE — 1125F AMNT PAIN NOTED PAIN PRSNT: CPT | Mod: CPTII,S$GLB,, | Performed by: INTERNAL MEDICINE

## 2023-07-24 PROCEDURE — 1159F PR MEDICATION LIST DOCUMENTED IN MEDICAL RECORD: ICD-10-PCS | Mod: CPTII,S$GLB,, | Performed by: INTERNAL MEDICINE

## 2023-07-24 PROCEDURE — 3078F DIAST BP <80 MM HG: CPT | Mod: CPTII,S$GLB,, | Performed by: INTERNAL MEDICINE

## 2023-07-24 PROCEDURE — 1101F PR PT FALLS ASSESS DOC 0-1 FALLS W/OUT INJ PAST YR: ICD-10-PCS | Mod: CPTII,S$GLB,, | Performed by: INTERNAL MEDICINE

## 2023-07-24 PROCEDURE — 36415 COLL VENOUS BLD VENIPUNCTURE: CPT | Mod: PO | Performed by: INTERNAL MEDICINE

## 2023-07-24 PROCEDURE — 1160F PR REVIEW ALL MEDS BY PRESCRIBER/CLIN PHARMACIST DOCUMENTED: ICD-10-PCS | Mod: CPTII,S$GLB,, | Performed by: INTERNAL MEDICINE

## 2023-07-24 PROCEDURE — 3078F PR MOST RECENT DIASTOLIC BLOOD PRESSURE < 80 MM HG: ICD-10-PCS | Mod: CPTII,S$GLB,, | Performed by: INTERNAL MEDICINE

## 2023-07-24 PROCEDURE — 80048 BASIC METABOLIC PNL TOTAL CA: CPT | Performed by: INTERNAL MEDICINE

## 2023-07-24 PROCEDURE — 1125F PR PAIN SEVERITY QUANTIFIED, PAIN PRESENT: ICD-10-PCS | Mod: CPTII,S$GLB,, | Performed by: INTERNAL MEDICINE

## 2023-07-24 PROCEDURE — 4010F ACE/ARB THERAPY RXD/TAKEN: CPT | Mod: CPTII,S$GLB,, | Performed by: INTERNAL MEDICINE

## 2023-07-24 PROCEDURE — 3288F PR FALLS RISK ASSESSMENT DOCUMENTED: ICD-10-PCS | Mod: CPTII,S$GLB,, | Performed by: INTERNAL MEDICINE

## 2023-07-24 PROCEDURE — 3044F PR MOST RECENT HEMOGLOBIN A1C LEVEL <7.0%: ICD-10-PCS | Mod: CPTII,S$GLB,, | Performed by: INTERNAL MEDICINE

## 2023-07-24 PROCEDURE — 3008F PR BODY MASS INDEX (BMI) DOCUMENTED: ICD-10-PCS | Mod: CPTII,S$GLB,, | Performed by: INTERNAL MEDICINE

## 2023-07-24 PROCEDURE — 3074F PR MOST RECENT SYSTOLIC BLOOD PRESSURE < 130 MM HG: ICD-10-PCS | Mod: CPTII,S$GLB,, | Performed by: INTERNAL MEDICINE

## 2023-07-24 PROCEDURE — 99999 PR PBB SHADOW E&M-EST. PATIENT-LVL V: CPT | Mod: PBBFAC,,, | Performed by: INTERNAL MEDICINE

## 2023-07-24 PROCEDURE — 99215 OFFICE O/P EST HI 40 MIN: CPT | Mod: S$GLB,,, | Performed by: INTERNAL MEDICINE

## 2023-07-24 PROCEDURE — 3044F HG A1C LEVEL LT 7.0%: CPT | Mod: CPTII,S$GLB,, | Performed by: INTERNAL MEDICINE

## 2023-07-24 PROCEDURE — 1159F MED LIST DOCD IN RCRD: CPT | Mod: CPTII,S$GLB,, | Performed by: INTERNAL MEDICINE

## 2023-07-24 PROCEDURE — 1160F RVW MEDS BY RX/DR IN RCRD: CPT | Mod: CPTII,S$GLB,, | Performed by: INTERNAL MEDICINE

## 2023-07-24 PROCEDURE — 99215 PR OFFICE/OUTPT VISIT, EST, LEVL V, 40-54 MIN: ICD-10-PCS | Mod: S$GLB,,, | Performed by: INTERNAL MEDICINE

## 2023-07-24 PROCEDURE — 4010F PR ACE/ARB THEARPY RXD/TAKEN: ICD-10-PCS | Mod: CPTII,S$GLB,, | Performed by: INTERNAL MEDICINE

## 2023-07-24 PROCEDURE — 1101F PT FALLS ASSESS-DOCD LE1/YR: CPT | Mod: CPTII,S$GLB,, | Performed by: INTERNAL MEDICINE

## 2023-07-24 PROCEDURE — 3008F BODY MASS INDEX DOCD: CPT | Mod: CPTII,S$GLB,, | Performed by: INTERNAL MEDICINE

## 2023-07-24 PROCEDURE — 3288F FALL RISK ASSESSMENT DOCD: CPT | Mod: CPTII,S$GLB,, | Performed by: INTERNAL MEDICINE

## 2023-07-24 PROCEDURE — 99999 PR PBB SHADOW E&M-EST. PATIENT-LVL V: ICD-10-PCS | Mod: PBBFAC,,, | Performed by: INTERNAL MEDICINE

## 2023-07-24 PROCEDURE — 3074F SYST BP LT 130 MM HG: CPT | Mod: CPTII,S$GLB,, | Performed by: INTERNAL MEDICINE

## 2023-07-24 RX ORDER — SACUBITRIL AND VALSARTAN 49; 51 MG/1; MG/1
1 TABLET, FILM COATED ORAL 2 TIMES DAILY
Qty: 60 TABLET | Refills: 3 | Status: SHIPPED | OUTPATIENT
Start: 2023-07-24

## 2023-07-24 NOTE — PATIENT INSTRUCTIONS
You have congestive heart failure.  Your heart function was normal.  This is known has diastolic heart failure  Switch lisinopril to Entresto (sacubitril/valsartan).  Stop lisinopril and wait 1.5 days before starting Entresto.  Pl member that Entresto is a twice a day medicine.

## 2023-07-24 NOTE — PROGRESS NOTES
Subjective:     Chief Complaint:  Vick Gonzalez is a 67 y.o. male referred by Emanuel Lopez MD for evaluation of Congestive Heart Failure.    Problem List and HPI:   Liver transplant 2009  - ETOH liver disease  HCV  DM on insulin  Hypertension  PAD   - (L) AT PTA 11/2017      Hospitalized w heart failure 5/12-5/21/2023. LVEF 60% and no significant valvular abnormality.  PA pressure was not reported.  Images were technically challenging.  I reviewed the hosp and subsequent clinic notes. Creatinine increased from 1.2 to 2.2 before returning to 1.3 m/gl. Not on daily regular furosemide since discharge from hospital and states that he has needed furosemide only x2.  Lisinopril was decreased from 40 mg to 20 mg a day.  Walks w a cane. Reports mild shortness of breath on exertion.  Weight at home is ~ 255 lb.      Review of patient's allergies indicates:  No Known Allergies     Current Outpatient Medications   Medication Sig    albuterol (VENTOLIN HFA) 90 mcg/actuation inhaler Inhale 2 puffs into the lungs every 6 (six) hours as needed for Wheezing or Shortness of Breath. Rescue    allopurinoL (ZYLOPRIM) 100 MG tablet TAKE 1 TABLET BY MOUTH TWICE A DAY    aluminum-magnesium hydroxide-simethicone (MAALOX) 200-200-20 mg/5 mL Susp Take 30 mLs by mouth 4 (four) times daily before meals and nightly. (Patient taking differently: Take 30 mLs by mouth every 6 (six) hours as needed.)    aspirin 81 MG Chew Take 1 tablet (81 mg total) by mouth once daily.    blood sugar diagnostic (TRUE METRIX GLUCOSE TEST STRIP) Strp USE 3 TIMES DAILY TO TEST BLOOD GLUCOSE LEVEL    blood-glucose meter Misc 1 Device by Misc.(Non-Drug; Combo Route) route 3 (three) times daily.    blood-glucose meter,continuous (DEXCOM G6 ) Misc Use with [employer] g6 system (transmitter,sensor)    blood-glucose sensor (DEXCOM G6 SENSOR) Viviane Use as directed    blood-glucose transmitter (DEXCOM G6 TRANSMITTER) Viviane Use as directed    calcium  "carbonate-vitamin D3 (CALCIUM 600 WITH VITAMIN D3) 600 mg(1,500mg) -400 unit Chew Take 1 tablet by mouth once daily.     CYANOCOBALAMIN, VITAMIN B-12, ORAL Chew 1 tablet by mouth 2 to 3 times a month.    diphenhydrAMINE (BENADRYL) 25 mg capsule Take 25 mg by mouth daily as needed for Allergies (Cold).    furosemide (LASIX) 20 MG tablet Take 2 tablets (40 mg total) by mouth daily as needed (For Weight Gain > 2-3 lbs in 1 day or 4-6 lbs over 1 week notify PCP and take 40 mg daily for three days).    gabapentin (NEURONTIN) 800 MG tablet Take 1 tablet (800 mg total) by mouth 3 (three) times daily.    insulin glargine U-300 conc (TOUJEO MAX U-300 SOLOSTAR) 300 unit/mL (3 mL) insulin pen Inject 40 Units into the skin once daily.    insulin lispro (HUMALOG KWIKPEN INSULIN) 100 unit/mL pen Inject 20 Units into the skin 3 (three) times daily with meals.    isosorbide-hydrALAZINE 20-37.5 mg (BIDIL) 20-37.5 mg Tab Take 1 tablet by mouth 3 (three) times daily.    lancets 30 gauge Misc 1 lancet by Misc.(Non-Drug; Combo Route) route 4 (four) times daily before meals and nightly.    levothyroxine (SYNTHROID) 88 MCG tablet TAKE 1 TABLET BY MOUTH EVERY DAY    lisinopriL (PRINIVIL,ZESTRIL) 20 MG tablet Take 1 tablet (20 mg total) by mouth once daily.    metoprolol succinate (TOPROL-XL) 200 MG 24 hr tablet TAKE 1 TABLET BY MOUTH EVERY DAY    multivitamin (THERAGRAN) per tablet Take 1 tablet by mouth once daily.     NIFEdipine (PROCARDIA-XL) 30 MG (OSM) 24 hr tablet Take 1 tablet (30 mg total) by mouth once daily.    NOVOFINE PLUS 32 gauge x 1/6" Ndle     rosuvastatin (CRESTOR) 5 MG tablet TAKE 1 TABLET BY MOUTH EVERY DAY    tacrolimus (PROGRAF) 1 MG Cap Take 2 capsules (2 mg total) by mouth every morning AND 1 capsule (1 mg total) every evening.    traZODone (DESYREL) 50 MG tablet TAKE 1 TABLET BY MOUTH EVERY EVENING.         Past Medical and Surgical history:  Vick Gonzalez  has a past medical history of Acute congestive heart " failure (5/12/2023), Anemia, Anxiety, Cataract, Chronic pain syndrome (07/13/2011), CKD (chronic kidney disease), stage III, Diabetes mellitus type II, uncontrolled, Discitis of lumbosacral region (01/16/2015), ED (erectile dysfunction), Encounter for blood transfusion, Genital herpes, Gout, arthritis, History of alcohol abuse, History of hepatitis C, s/p successful Rx w/ SVR24 (cure) - 5/2018 (08/23/2011), History of positive PPD, treatment status unknown, History of substance abuse, Hypertension, Hypothyroidism, Liver replaced by transplant (08/23/2011), Pancreatitis (2016), Peptic ulcer disease, and Pulmonary emphysema, unspecified emphysema type (5/26/2023).   Past Surgical History:   Procedure Laterality Date    ANTERIOR CERVICAL DISCECTOMY W/ FUSION N/A 8/22/2022    Procedure: Procedure: ACDF 4-7 LOS: 4.0 Anesthesia: General Blood: Type & Screen Radiology: C-Arm Microscope: ------- SNS: EMG, SEP, MEP Brace: [place] Bed: Regular Bed, Shoulder Strap Headrest:------ Position: Supine Equipment: Depuy;  Surgeon: Titi Christian MD;  Location: [facility] OR;  Service: Neurosurgery;  Laterality: N/A;  Depuy  confirmed CW 8/19  Neuro monitoring confirmed CW 8/19    CARPAL TUNNEL RELEASE Left 10/29/2021    Procedure: RELEASE, CARPAL TUNNEL;  Surgeon: Jameson Alejo Jr., MD;  Location: [facility] OR;  Service: Orthopedics;  Laterality: Left;    CHOLECYSTECTOMY      DECOMPRESSION OF CERVICAL SPINE BY ANTERIOR APPROACH WITH FUSION  8/22/2022    Procedure: DECOMPRESSION AND FUSION, SPINE, CERVICAL, ANTERIOR APPROACH;  Surgeon: Titi Christian MD;  Location: [facility] OR;  Service: Neurosurgery;;    INJECTION OF JOINT Right 12/2/2019    Procedure: INJECTION, JOINT SI;  Surgeon: Thu Penn MD;  Location: [facility] PAIN MGT;  Service: Pain Management;  Laterality: Right;  RT SI JNT INJ    LIVER TRANSPLANT  06/2010    SPINE SURGERY      TRANSFORAMINAL EPIDURAL INJECTION OF STEROID Left 5/29/2023    Procedure: INJECTION, STEROID, EPIDURAL,  "TRANSFORAMINAL APPROACH,  LEFT C7-T1 DIRECT REF;  Surgeon: Thu Penn MD;  Location: [facility];  Service: Pain Management;  Laterality: Left;  4/3 MD ILL/PT WILL C/B       Social history:  Vick Gonzalez  reports that he has quit smoking. He has never been exposed to tobacco smoke. He has never used smokeless tobacco. He reports that he does not currently use drugs. He reports that he does not drink alcohol.    Family history:  Family History   Problem Relation Age of Onset    Cancer Mother     Diabetes Mother     Heart disease Mother     Diabetes Sister     Cancer Maternal Uncle 82        colon CA    Drug abuse Daughter     Melanoma Neg Hx     Psoriasis Neg Hx     Lupus Neg Hx     Eczema Neg Hx     Acne Neg Hx           Objective:   /76   Pulse 76   Ht 6' 1" (1.854 m)   Wt 118.5 kg (261 lb 2.2 oz)   BMI 34.45 kg/m²    Physical Exam  Constitutional:       Appearance: He is well-developed.      Comments: /76   Pulse 76   Ht 6' 1" (1.854 m)   Wt 118.5 kg (261 lb 2.2 oz)   BMI 34.45 kg/m²      HENT:      Head: Normocephalic.   Neck:      Vascular: No JVD.   Cardiovascular:      Rate and Rhythm: Normal rate and regular rhythm.      Pulses:           Radial pulses are 2+ on the right side and 2+ on the left side.        Posterior tibial pulses are 1+ on the right side and 1+ on the left side.      Heart sounds: S1 normal and S2 normal. No murmur heard.  Pulmonary:      Breath sounds: Normal breath sounds.   Chest:      Chest wall: There is no dullness to percussion.   Abdominal:      Palpations: Abdomen is soft. There is no hepatomegaly, splenomegaly or mass.   Musculoskeletal:      Right lower leg: No edema.      Left lower leg: No edema.   Skin:     Findings: No bruising.      Nails: There is no clubbing.   Neurological:      Mental Status: He is alert and oriented to person, place, and time.      Gait: Gait normal.   Psychiatric:         Speech: Speech normal.         Behavior: Behavior " normal.         Lab Results   Component Value Date    CHOL 156 05/13/2023    CHOL 113 (L) 01/25/2022    CHOL 125 10/11/2021     Lab Results   Component Value Date    HDL 29 (L) 05/13/2023    HDL 30 (L) 01/25/2022    HDL 27 (L) 10/11/2021     Lab Results   Component Value Date    LDLCALC 84.4 05/13/2023    LDLCALC 52.4 (L) 01/25/2022    LDLCALC 18.6 (L) 10/11/2021     Lab Results   Component Value Date    TRIG 213 (H) 05/13/2023    TRIG 153 (H) 01/25/2022    TRIG 397 (H) 10/11/2021     Lab Results   Component Value Date    CHOLHDL 18.6 (L) 05/13/2023    CHOLHDL 26.5 01/25/2022    CHOLHDL 21.6 10/11/2021     Lab Results   Component Value Date    ALT 18 06/20/2023    AST 18 06/20/2023     (H) 04/24/2017    ALKPHOS 71 06/20/2023    BILITOT 0.3 06/20/2023      Lab Results   Component Value Date    CREATININE 1.3 06/20/2023    BUN 23 06/20/2023     06/20/2023    K 4.5 06/20/2023     06/20/2023    CO2 22 (L) 06/20/2023       Results for orders placed during the hospital encounter of 05/12/23    Echo Saline Bubble? Yes    Interpretation Summary  · Technically difficult study  · The left ventricle is normal in size with concentric remodeling and normal systolic function. The estimated ejection fraction is 60%.  · Normal right ventricular size with normal right ventricular systolic function.  · Normal left ventricular diastolic function.  · Intermediate central venous pressure (8 mmHg).  · There is no evidence of intracardiac shunting.       No results found for this or any previous visit.          Assessment and Plan:       ICD-10-CM ICD-9-CM   1. Chronic heart failure with preserved ejection fraction  I50.32 428.9   2. Hyperlipidemia associated with type 2 diabetes mellitus  E11.69 250.80    E78.5 272.4   3. S/P liver transplant  Z94.4 V42.7   4. Acute renal failure - resolved  N17.9 584.9        He has new onset diastolic heart failure hospitalized in 5/2023.  He has required furosemide only twice since  discharge from hospital.  BMP today.  Renal failure had resolved, if creatinine is stable, I would like to switch lisinopril to sacubitril/valsartan. Instructed patient to wait 36 hours.  Continue metoprolol.  Discussed adding SGLT-2 inhibitor such as empagliflozin (Jardiance), dapagliflozin (Farxiga) or canagliflozin (Invokava) at a later date.  Okay to continue nifedipine for now.  Salt restriction.  Fluid intake 1.5-2 liters a day.  Continue furosemide p.r.n.  Patient requested follow-up appointment with Dr. Lopez.      Orders entered during this encounter:    Chronic heart failure with preserved ejection fraction  -     Ambulatory referral/consult to Cardiology  -     Basic Metabolic Panel; Future; Expected date: 07/24/2023  -     sacubitriL-valsartan (ENTRESTO) 49-51 mg per tablet; Take 1 tablet by mouth 2 (two) times daily.  Dispense: 60 tablet; Refill: 3    Hyperlipidemia associated with type 2 diabetes mellitus    S/P liver transplant    Acute renal failure - resolved         Follow up in about 2 months (around 9/24/2023).

## 2023-07-25 ENCOUNTER — TELEPHONE (OUTPATIENT)
Dept: FAMILY MEDICINE | Facility: CLINIC | Age: 68
End: 2023-07-25
Payer: MEDICARE

## 2023-07-25 NOTE — TELEPHONE ENCOUNTER
----- Message from Deepthi Jarvis MA sent at 7/24/2023  5:46 PM CDT -----    ----- Message -----  From: Leonarda Cruz  Sent: 7/24/2023  11:08 AM CDT  To: [facility] Staff    Type:  Sooner Apoointment Request    Caller is requesting a sooner appointment.  Caller declined first available appointment listed below.  Caller will not accept being placed on the waitlist and is requesting a message be sent to doctor.  Name of Caller:pt  When is the first available appointment?10/17  Symptoms:sooner appt   Would the patient rather a call back or a response via [facility]? call  Best Call Back Number:[phone]  Additional Information:

## 2023-07-31 ENCOUNTER — OFFICE VISIT (OUTPATIENT)
Dept: FAMILY MEDICINE | Facility: CLINIC | Age: 68
End: 2023-07-31
Payer: MEDICARE

## 2023-07-31 VITALS
OXYGEN SATURATION: 98 % | HEART RATE: 80 BPM | WEIGHT: 260.13 LBS | BODY MASS INDEX: 34.47 KG/M2 | DIASTOLIC BLOOD PRESSURE: 68 MMHG | TEMPERATURE: 98 F | SYSTOLIC BLOOD PRESSURE: 110 MMHG | HEIGHT: 73 IN

## 2023-07-31 DIAGNOSIS — R26.89 BALANCE PROBLEM: ICD-10-CM

## 2023-07-31 DIAGNOSIS — Z79.4 TYPE 2 DIABETES MELLITUS WITH DIABETIC POLYNEUROPATHY, WITH LONG-TERM CURRENT USE OF INSULIN: Chronic | ICD-10-CM

## 2023-07-31 DIAGNOSIS — I50.32 CHRONIC DIASTOLIC CONGESTIVE HEART FAILURE: ICD-10-CM

## 2023-07-31 DIAGNOSIS — M96.1 POSTLAMINECTOMY SYNDROME OF LUMBAR REGION: ICD-10-CM

## 2023-07-31 DIAGNOSIS — E11.42 DIABETIC POLYNEUROPATHY ASSOCIATED WITH TYPE 2 DIABETES MELLITUS: ICD-10-CM

## 2023-07-31 DIAGNOSIS — G89.4 CHRONIC PAIN SYNDROME: ICD-10-CM

## 2023-07-31 DIAGNOSIS — G63 POLYNEUROPATHY ASSOCIATED WITH UNDERLYING DISEASE: ICD-10-CM

## 2023-07-31 DIAGNOSIS — E11.42 TYPE 2 DIABETES MELLITUS WITH DIABETIC POLYNEUROPATHY, WITH LONG-TERM CURRENT USE OF INSULIN: Chronic | ICD-10-CM

## 2023-07-31 DIAGNOSIS — N52.9 ERECTILE DYSFUNCTION, UNSPECIFIED ERECTILE DYSFUNCTION TYPE: Primary | ICD-10-CM

## 2023-07-31 PROCEDURE — 3078F PR MOST RECENT DIASTOLIC BLOOD PRESSURE < 80 MM HG: ICD-10-PCS | Mod: CPTII,S$GLB,, | Performed by: FAMILY MEDICINE

## 2023-07-31 PROCEDURE — 99999 PR PBB SHADOW E&M-EST. PATIENT-LVL V: CPT | Mod: PBBFAC,,, | Performed by: FAMILY MEDICINE

## 2023-07-31 PROCEDURE — 1125F AMNT PAIN NOTED PAIN PRSNT: CPT | Mod: CPTII,S$GLB,, | Performed by: FAMILY MEDICINE

## 2023-07-31 PROCEDURE — 3044F PR MOST RECENT HEMOGLOBIN A1C LEVEL <7.0%: ICD-10-PCS | Mod: CPTII,S$GLB,, | Performed by: FAMILY MEDICINE

## 2023-07-31 PROCEDURE — 1159F PR MEDICATION LIST DOCUMENTED IN MEDICAL RECORD: ICD-10-PCS | Mod: CPTII,S$GLB,, | Performed by: FAMILY MEDICINE

## 2023-07-31 PROCEDURE — 3044F HG A1C LEVEL LT 7.0%: CPT | Mod: CPTII,S$GLB,, | Performed by: FAMILY MEDICINE

## 2023-07-31 PROCEDURE — 1159F MED LIST DOCD IN RCRD: CPT | Mod: CPTII,S$GLB,, | Performed by: FAMILY MEDICINE

## 2023-07-31 PROCEDURE — 3074F SYST BP LT 130 MM HG: CPT | Mod: CPTII,S$GLB,, | Performed by: FAMILY MEDICINE

## 2023-07-31 PROCEDURE — 1101F PT FALLS ASSESS-DOCD LE1/YR: CPT | Mod: CPTII,S$GLB,, | Performed by: FAMILY MEDICINE

## 2023-07-31 PROCEDURE — 4010F PR ACE/ARB THEARPY RXD/TAKEN: ICD-10-PCS | Mod: CPTII,S$GLB,, | Performed by: FAMILY MEDICINE

## 2023-07-31 PROCEDURE — 99215 PR OFFICE/OUTPT VISIT, EST, LEVL V, 40-54 MIN: ICD-10-PCS | Mod: S$GLB,,, | Performed by: FAMILY MEDICINE

## 2023-07-31 PROCEDURE — 99499 UNLISTED E&M SERVICE: CPT | Mod: S$GLB,,, | Performed by: FAMILY MEDICINE

## 2023-07-31 PROCEDURE — 3008F PR BODY MASS INDEX (BMI) DOCUMENTED: ICD-10-PCS | Mod: CPTII,S$GLB,, | Performed by: FAMILY MEDICINE

## 2023-07-31 PROCEDURE — 3078F DIAST BP <80 MM HG: CPT | Mod: CPTII,S$GLB,, | Performed by: FAMILY MEDICINE

## 2023-07-31 PROCEDURE — 3288F PR FALLS RISK ASSESSMENT DOCUMENTED: ICD-10-PCS | Mod: CPTII,S$GLB,, | Performed by: FAMILY MEDICINE

## 2023-07-31 PROCEDURE — 99999 PR PBB SHADOW E&M-EST. PATIENT-LVL V: ICD-10-PCS | Mod: PBBFAC,,, | Performed by: FAMILY MEDICINE

## 2023-07-31 PROCEDURE — 1101F PR PT FALLS ASSESS DOC 0-1 FALLS W/OUT INJ PAST YR: ICD-10-PCS | Mod: CPTII,S$GLB,, | Performed by: FAMILY MEDICINE

## 2023-07-31 PROCEDURE — 3074F PR MOST RECENT SYSTOLIC BLOOD PRESSURE < 130 MM HG: ICD-10-PCS | Mod: CPTII,S$GLB,, | Performed by: FAMILY MEDICINE

## 2023-07-31 PROCEDURE — 3008F BODY MASS INDEX DOCD: CPT | Mod: CPTII,S$GLB,, | Performed by: FAMILY MEDICINE

## 2023-07-31 PROCEDURE — 3288F FALL RISK ASSESSMENT DOCD: CPT | Mod: CPTII,S$GLB,, | Performed by: FAMILY MEDICINE

## 2023-07-31 PROCEDURE — 4010F ACE/ARB THERAPY RXD/TAKEN: CPT | Mod: CPTII,S$GLB,, | Performed by: FAMILY MEDICINE

## 2023-07-31 PROCEDURE — 99215 OFFICE O/P EST HI 40 MIN: CPT | Mod: S$GLB,,, | Performed by: FAMILY MEDICINE

## 2023-07-31 PROCEDURE — 1125F PR PAIN SEVERITY QUANTIFIED, PAIN PRESENT: ICD-10-PCS | Mod: CPTII,S$GLB,, | Performed by: FAMILY MEDICINE

## 2023-07-31 NOTE — Clinical Note
"Hi Dr. Robison just wanted to update on Mr. Gonzalez; saw him today, was a bit orthostatic.  Compliant with the Entresto and metoprolol.  Not really taking the isosorbide hydralazine as prescribed, just takes it "here and there" (took this am)  Given some orthostasis I told him to just stay off the  isosorbide/hydralazine for now.  He is complaining mainly of a lot of balance issues which I suspect might be contributed by the orthostasis but mainly has likely to do with his diabetic neuropathy Emanuel "

## 2023-07-31 NOTE — PATIENT INSTRUCTIONS
Stop the isosorbide/hydralazine since your pressure is low and drops when you stand  Continue the entresto twice daily and the metoprolol 200 mg daily for your heart/blood pressure  Check back with Dr. Christian regarding your balance concerns because there is a possibility that it could be related to the nerves coming off your lower back.   Also balance issues can be from neuropathy (nerve damage) due to diabetes. Since your diabetes has just recently gotten under better control, hopefully nerve issues improve to some degree over time.   You may potentially need to see your neurologist again for another nerve conduction test of your lower legs to figure out if the issues are mainly from diabetes neuropathy or from the back.

## 2023-07-31 NOTE — PROGRESS NOTES
(Portions of this note were dictated using voice recognition software and may contain dictation related errors in spelling/grammar/syntax not found on text review)    CC:   Chief Complaint   Patient presents with    Follow-up     Balance problems, x5 months, worsening       HPI: 67 y.o. male     Presents for balance problems listed for months above but states that probably going on for years, getting steadily worse.  When sitting he does not note any dizziness or loss of balance concerns but when he stands like when he is showering, or when he is walking he will feel unsteady.  He denies any vertigo sensation.  Sometimes does feel lightheaded.  Has chronically occasional chest pain or shortness of breath issues, nonacute.  He does remark that he can not feel anything on his feet when he walks.  He walks with a cane.  He stays without the cane he feels very unsteady.     CHF prompting hospitalization in May 2023  Started on isosorbide hydralazine 20/37.5 t.i.d.  Prescribed furosemide 40 mg p.r.n. for weight gain greater than 2-3 lb in 1 day or 4-6 lb over 1 week, has not needed  Was on lisinopril but this was changed in favor of Entresto 49/51 b.i.d. after cardiology visit 07/24/2023  metoprolol 200 mg daily   Was on nifedipine prior but currently not taking  At last visit had mentioned significant sodium indiscretion prior to admission and drinking 6 energy drinks a day with the equivalent of 780 mg of caffeine, this was stopped after admission    Echo 05/15/2023   Technically difficult study  The left ventricle is normal in size with concentric remodeling and normal systolic function. The estimated ejection fraction is 60%.  Normal right ventricular size with normal right ventricular systolic function.  Normal left ventricular diastolic function.  Intermediate central venous pressure (8 mmHg).  There is no evidence of intracardiac shunting.          Diabetes with peripheral neuropathy:  Complicated by   dietary noncompliance now followed by endocrinology (last seen January 2022), was on Trulicity but not currently taking.  His current regimen of diabetes was adjusted to glargine (toujeo) 40 units, NovoLog to up to 20 units t.i.d. with meals.  Was believed that a lot of his hyperglycemia issues prior were relating to dietary and insulin noncompliance.   .  A1c has increased to 9.7 (aug 2022).  However, recently states that he has been checking his sugars and is getting usually readings around 90s in the morning and 130s to 140s if taken 2 hours postprandial.  Compliant with the above medication therapy.  Concerned about weight gain was if he can take something lose weight.  Was on G LP 1 agonist therapy but I believe it was  advised stay off this given history of pancreatitis (seems be related to alcohol use in the past but does not drink longer) .       Dyslipidemia on Crestor 5mg daily.     Hypothyroidism:  Synthroid 88mcg.       Anxiety on Zoloft 100 mg daily.       Liver Transplant secondary to end-stage liver disease hepatitis C and prior alcohol abuse.:  Followed by hepatology.  On Prograf.   fibroscan showed stage II fibrosis     Chronic pain, followed by pain management.  Was prior On oxycodone along with his gabapentin.  However failed drug test with positive cocaine and Soma.  History of cocaine abuse,     pain management had recommended continuing with gabapentin and interventional management if desired.       Neuro surgery following for cervical disc disease, Had cervical spine fusion surgery August 2022.  Followed up with neuro surgery and was having some worsening left upper extremity pain.  EMG showed chronic left C7 radiculopathy and left ulnar neuropathy across the elbow.  Had discussed ALCIDES but he did not want to pursue this.    Past Medical History:   Diagnosis Date    Acute congestive heart failure 5/12/2023    Anemia     Anxiety     Cataract     Chronic pain syndrome 07/13/2011    CKD (chronic  kidney disease), stage III     Diabetes mellitus type II, uncontrolled     Discitis of lumbosacral region 01/16/2015    ED (erectile dysfunction)     Encounter for blood transfusion     Genital herpes     Gout, arthritis     History of alcohol abuse     History of hepatitis C, s/p successful Rx w/ SVR24 (cure) - 5/2018 08/23/2011    Completed 24wks Epclusa + RBV w/SVR12 - 2/2018  -     History of positive PPD, treatment status unknown     Pulmonary granulomas, negative sputum cultures for AFB and indeterminate quantferon test    History of substance abuse     Hypertension     Hypothyroidism     Liver replaced by transplant 08/23/2011    DATE: 12/16/2013  LIVER BIOPSY : REASON:  hep C staging  PATHOLOGY COMMITTEE NOTE/PLAN: :grade  1 / stage 1        Pancreatitis 2016    Peptic ulcer disease     Pulmonary emphysema, unspecified emphysema type 5/26/2023       Past Surgical History:   Procedure Laterality Date    ANTERIOR CERVICAL DISCECTOMY W/ FUSION N/A 8/22/2022    Procedure: Procedure: ACDF 4-7 LOS: 4.0 Anesthesia: General Blood: Type & Screen Radiology: C-Arm Microscope: ------- SNS: EMG, SEP, MEP Brace: Buckeye Bed: Regular Bed, Shoulder Strap Headrest:------ Position: Supine Equipment: Depuy;  Surgeon: Titi Christian MD;  Location: [facility] OR;  Service: Neurosurgery;  Laterality: N/A;  Depuy  confirmed CW 8/19  Neuro monitoring confirmed CW 8/19    CARPAL TUNNEL RELEASE Left 10/29/2021    Procedure: RELEASE, CARPAL TUNNEL;  Surgeon: Jameson Alejo Jr., MD;  Location: [facility] OR;  Service: Orthopedics;  Laterality: Left;    CHOLECYSTECTOMY      DECOMPRESSION OF CERVICAL SPINE BY ANTERIOR APPROACH WITH FUSION  8/22/2022    Procedure: DECOMPRESSION AND FUSION, SPINE, CERVICAL, ANTERIOR APPROACH;  Surgeon: Titi Christian MD;  Location: [facility] OR;  Service: Neurosurgery;;    INJECTION OF JOINT Right 12/2/2019    Procedure: INJECTION, JOINT SI;  Surgeon: Thu Penn MD;  Location: [facility] PAIN MGT;  Service: Pain  Management;  Laterality: Right;  RT SI JNT INJ    LIVER TRANSPLANT  06/2010    SPINE SURGERY      TRANSFORAMINAL EPIDURAL INJECTION OF STEROID Left 5/29/2023    Procedure: INJECTION, STEROID, EPIDURAL, TRANSFORAMINAL APPROACH,  LEFT C7-T1 DIRECT REF;  Surgeon: Thu Penn MD;  Location: [facility] PAIN MGT;  Service: Pain Management;  Laterality: Left;  4/3 MD ILL/PT WILL C/B       Family History   Problem Relation Age of Onset    Cancer Mother     Diabetes Mother     Heart disease Mother     Diabetes Sister     Cancer Maternal Uncle 82        colon CA    Drug abuse Daughter     Melanoma Neg Hx     Psoriasis Neg Hx     Lupus Neg Hx     Eczema Neg Hx     Acne Neg Hx        Social History     Tobacco Use    Smoking status: Former     Current packs/day: 0.00     Passive exposure: Never    Smokeless tobacco: Never   Substance Use Topics    Alcohol use: No     Comment: over 5 years ago, none currently    Drug use: Not Currently     Comment: Former cocaine use       Lab Results   Component Value Date    WBC 12.06 05/31/2023    HGB 11.9 (L) 05/31/2023    HCT 37.6 (L) 05/31/2023    MCV 92 05/31/2023     05/31/2023    CHOL 156 05/13/2023    TRIG 213 (H) 05/13/2023    HDL 29 (L) 05/13/2023    ALT 18 06/20/2023    AST 18 06/20/2023    BILITOT 0.3 06/20/2023    ALKPHOS 71 06/20/2023     07/24/2023    K 4.8 07/24/2023     07/24/2023    CREATININE 1.3 07/24/2023    ESTGFRAFRICA >60.0 07/25/2022    EGFRNONAA 52.7 (A) 07/25/2022    EGFRNORACEVR >60.0 07/24/2023    CALCIUM 9.4 07/24/2023    ALBUMIN 3.9 06/20/2023    BUN 18 07/24/2023    CO2 18 (L) 07/24/2023    TSH 1.288 05/13/2023    PSA 0.22 01/19/2022    PSADIAG 0.28 10/09/2017    INR 1.0 04/24/2023    HGBA1C 6.8 (H) 05/13/2023    MICALBCREAT 30.8 (H) 08/16/2022    LDLCALC 84.4 05/13/2023     (H) 07/24/2023    [id number] 30 10/11/2021         Hemoglobin A1C (%)   Date Value   05/13/2023 6.8 (H)   12/05/2022 7.8 (H)   08/16/2022 9.7 (H)   01/25/2022 7.4 (H)  "  01/24/2022 7.4 (H)   10/11/2021 10.1 (H)   11/11/2020 9.1 (H)   09/01/2020 11.9 (H)   01/23/2020 8.7 (H)   10/04/2019 7.4 (H)     eGFR (mL/min/1.73 m^2)   Date Value   07/24/2023 >60.0   06/20/2023 >60.0   05/31/2023 32.4 (A)   05/21/2023 51.3 (A)   05/20/2023 36.4 (A)   05/19/2023 38.7 (A)   05/18/2023 38.7 (A)   05/17/2023 36.4 (A)   05/17/2023 51.3 (A)   05/16/2023 47.5 (A)             Vital signs reviewed  Vitals:    07/31/23 0941   BP: 110/68   BP Location: Right arm   Patient Position: Sitting   BP Method: Medium (Manual)   Pulse: 80   Temp: 97.8 °F (36.6 °C)   TempSrc: Oral   SpO2: 98%   Weight: 118 kg (260 lb 2.3 oz)   Height: 6' 1" (1.854 m)       Wt Readings from Last 4 Encounters:   07/31/23 118 kg (260 lb 2.3 oz)   07/24/23 118.5 kg (261 lb 2.2 oz)   06/20/23 118.5 kg (261 lb 4.8 oz)   05/31/23 116.6 kg (257 lb 1.6 oz)       PE:   APPEARANCE: Well nourished, well developed, in no acute distress.    HEAD: Normocephalic, atraumatic.  EYES:   Conjunctivae noninjected.  DIABETIC FOOT EXAM: Protective Sensation (w/ 10 gram monofilament):  Right: Absent  Left: Absent    Visual Inspection:  Dry Skin -  Bilateral    Pedal Pulses:   Right: Absent  Left: Absent    Posterior Tibialis Pulses:   Right:Absent  Left: Absent              IMPRESSION  1. Erectile dysfunction, unspecified erectile dysfunction type    2. Polyneuropathy associated with underlying disease    3. Diabetic polyneuropathy associated with type 2 diabetes mellitus    4. Type 2 diabetes mellitus with diabetic polyneuropathy, with long-term current use of insulin    5. Balance problem    6. Chronic pain syndrome    7. Postlaminectomy syndrome of lumbar region              PLAN  Orders Placed This Encounter   Procedures    Ambulatory referral/consult to Urology      Hypertension, /62 on sitting, dropped to 90/60 on standing.  Does feel a bit unsteady standing up during the check.  Of note he states he is not consistently taking his bi-dil, " takes once in a while here in there.  States that he did take this morning.  He does take the Entresto and metoprolol.  He can continue the latter 2 but since he is not consistently taking the isosorbide hydralazine, given his low blood pressures would advise stay off this.  Will also notify his cardiologist    The majority of his balance issues I suspect likely from diabetic neuropathy.  This could also be contributed by lumbar spinal issues given his chronic history.  He is currently on gabapentin 800 mg t.i.d..  Denies any pain associated with neuropathy just mainly numbness and balance concerns.  He states he did therapy several months ago but chart review shows that this was likely for his neck and hand paresthesia, not for balance.  He is hesitant to do anymore therapy for balance.  He is advised to check with his neurosurgeon and neurologist regarding lumbar spinal testing and lower extremity EMG consideration in case of any actionable pathology.  His diabetes has just recently showing some improvement after longstanding poor control.  Hopefully with controlling diabetes at least some element of neuropathy might be improved upon.    Would recommend reassessment of his diabetes in the next 2 months      SCREENINGS   Colonoscopy 2015, return in 10 years   prostate testing 1/19/22 PSA     Immunizations   Pneumovax 2019  PCV 10/29/2018   COVID vaccine x 3   Hepatitis B series up-to-date   Tdap 2021   Can get zoster vaccine at pharmacy

## 2023-08-01 ENCOUNTER — TELEPHONE (OUTPATIENT)
Dept: FAMILY MEDICINE | Facility: CLINIC | Age: 68
End: 2023-08-01
Payer: MEDICARE

## 2023-08-01 ENCOUNTER — OFFICE VISIT (OUTPATIENT)
Dept: NEUROSURGERY | Facility: CLINIC | Age: 68
End: 2023-08-01
Payer: MEDICARE

## 2023-08-01 VITALS — BODY MASS INDEX: 35.21 KG/M2 | WEIGHT: 260 LBS | HEIGHT: 72 IN

## 2023-08-01 DIAGNOSIS — M54.12 CERVICAL RADICULOPATHY: ICD-10-CM

## 2023-08-01 DIAGNOSIS — G95.9 CERVICAL MYELOPATHY: ICD-10-CM

## 2023-08-01 DIAGNOSIS — R29.898 LEFT HAND WEAKNESS: ICD-10-CM

## 2023-08-01 DIAGNOSIS — Z98.1 S/P CERVICAL SPINAL FUSION: Primary | ICD-10-CM

## 2023-08-01 PROCEDURE — 99215 OFFICE O/P EST HI 40 MIN: CPT | Mod: S$GLB,,, | Performed by: NEUROLOGICAL SURGERY

## 2023-08-01 PROCEDURE — 1159F PR MEDICATION LIST DOCUMENTED IN MEDICAL RECORD: ICD-10-PCS | Mod: CPTII,S$GLB,, | Performed by: NEUROLOGICAL SURGERY

## 2023-08-01 PROCEDURE — 1101F PT FALLS ASSESS-DOCD LE1/YR: CPT | Mod: CPTII,S$GLB,, | Performed by: NEUROLOGICAL SURGERY

## 2023-08-01 PROCEDURE — 4010F ACE/ARB THERAPY RXD/TAKEN: CPT | Mod: CPTII,S$GLB,, | Performed by: NEUROLOGICAL SURGERY

## 2023-08-01 PROCEDURE — 1125F PR PAIN SEVERITY QUANTIFIED, PAIN PRESENT: ICD-10-PCS | Mod: CPTII,S$GLB,, | Performed by: NEUROLOGICAL SURGERY

## 2023-08-01 PROCEDURE — 3008F BODY MASS INDEX DOCD: CPT | Mod: CPTII,S$GLB,, | Performed by: NEUROLOGICAL SURGERY

## 2023-08-01 PROCEDURE — 1159F MED LIST DOCD IN RCRD: CPT | Mod: CPTII,S$GLB,, | Performed by: NEUROLOGICAL SURGERY

## 2023-08-01 PROCEDURE — 99215 PR OFFICE/OUTPT VISIT, EST, LEVL V, 40-54 MIN: ICD-10-PCS | Mod: S$GLB,,, | Performed by: NEUROLOGICAL SURGERY

## 2023-08-01 PROCEDURE — 3288F FALL RISK ASSESSMENT DOCD: CPT | Mod: CPTII,S$GLB,, | Performed by: NEUROLOGICAL SURGERY

## 2023-08-01 PROCEDURE — 1101F PR PT FALLS ASSESS DOC 0-1 FALLS W/OUT INJ PAST YR: ICD-10-PCS | Mod: CPTII,S$GLB,, | Performed by: NEUROLOGICAL SURGERY

## 2023-08-01 PROCEDURE — 3044F HG A1C LEVEL LT 7.0%: CPT | Mod: CPTII,S$GLB,, | Performed by: NEUROLOGICAL SURGERY

## 2023-08-01 PROCEDURE — 4010F PR ACE/ARB THEARPY RXD/TAKEN: ICD-10-PCS | Mod: CPTII,S$GLB,, | Performed by: NEUROLOGICAL SURGERY

## 2023-08-01 PROCEDURE — 99999 PR PBB SHADOW E&M-EST. PATIENT-LVL IV: CPT | Mod: PBBFAC,,, | Performed by: NEUROLOGICAL SURGERY

## 2023-08-01 PROCEDURE — 3008F PR BODY MASS INDEX (BMI) DOCUMENTED: ICD-10-PCS | Mod: CPTII,S$GLB,, | Performed by: NEUROLOGICAL SURGERY

## 2023-08-01 PROCEDURE — 3044F PR MOST RECENT HEMOGLOBIN A1C LEVEL <7.0%: ICD-10-PCS | Mod: CPTII,S$GLB,, | Performed by: NEUROLOGICAL SURGERY

## 2023-08-01 PROCEDURE — 99999 PR PBB SHADOW E&M-EST. PATIENT-LVL IV: ICD-10-PCS | Mod: PBBFAC,,, | Performed by: NEUROLOGICAL SURGERY

## 2023-08-01 PROCEDURE — 3288F PR FALLS RISK ASSESSMENT DOCUMENTED: ICD-10-PCS | Mod: CPTII,S$GLB,, | Performed by: NEUROLOGICAL SURGERY

## 2023-08-01 PROCEDURE — 1125F AMNT PAIN NOTED PAIN PRSNT: CPT | Mod: CPTII,S$GLB,, | Performed by: NEUROLOGICAL SURGERY

## 2023-08-01 NOTE — TELEPHONE ENCOUNTER
Patient dropped off his pre-op clearance paperwork from Neurosurgery. There is no form to complete, but it requests H&P and statement that patient is cleared for anesthesia and surgery. Also requests:    CMP  CBC  PT/PTT  Urinalysis    EKG    Chest x-ray
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
## 2023-08-02 ENCOUNTER — TELEPHONE (OUTPATIENT)
Dept: NEUROSURGERY | Facility: CLINIC | Age: 68
End: 2023-08-02
Payer: MEDICARE

## 2023-08-02 ENCOUNTER — HOSPITAL ENCOUNTER (INPATIENT)
Facility: HOSPITAL | Age: 68
LOS: 1 days | Discharge: HOME | DRG: 281 | End: 2023-08-03
Attending: STUDENT IN AN ORGANIZED HEALTH CARE EDUCATION/TRAINING PROGRAM | Admitting: INTERNAL MEDICINE
Payer: MEDICARE

## 2023-08-02 DIAGNOSIS — N17.9 AKI (ACUTE KIDNEY INJURY): ICD-10-CM

## 2023-08-02 DIAGNOSIS — M48.02 CERVICAL SPINAL STENOSIS: ICD-10-CM

## 2023-08-02 DIAGNOSIS — I21.4 NSTEMI (NON-ST ELEVATED MYOCARDIAL INFARCTION): Primary | ICD-10-CM

## 2023-08-02 DIAGNOSIS — R07.9 CHEST PAIN: ICD-10-CM

## 2023-08-02 DIAGNOSIS — M54.12 CERVICAL RADICULOPATHY: Primary | ICD-10-CM

## 2023-08-02 DIAGNOSIS — G95.9 CERVICAL MYELOPATHY: ICD-10-CM

## 2023-08-02 DIAGNOSIS — E11.42 TYPE 2 DIABETES MELLITUS WITH DIABETIC POLYNEUROPATHY, WITH LONG-TERM CURRENT USE OF INSULIN: Chronic | ICD-10-CM

## 2023-08-02 DIAGNOSIS — Z79.4 TYPE 2 DIABETES MELLITUS WITH DIABETIC POLYNEUROPATHY, WITH LONG-TERM CURRENT USE OF INSULIN: Chronic | ICD-10-CM

## 2023-08-02 LAB
ALBUMIN SERPL BCP-MCNC: 4.1 G/DL (ref 3.5–5.2)
ALP SERPL-CCNC: 71 U/L (ref 55–135)
ALT SERPL W/O P-5'-P-CCNC: 21 U/L (ref 10–44)
AMPHET+METHAMPHET UR QL: NEGATIVE
ANION GAP SERPL CALC-SCNC: 10 MMOL/L (ref 8–16)
APTT PPP: 27 SEC (ref 21–32)
AST SERPL-CCNC: 23 U/L (ref 10–40)
BARBITURATES UR QL SCN>200 NG/ML: NEGATIVE
BASOPHILS # BLD AUTO: 0.04 K/UL (ref 0–0.2)
BASOPHILS NFR BLD: 0.4 % (ref 0–1.9)
BENZODIAZ UR QL SCN>200 NG/ML: NEGATIVE
BILIRUB SERPL-MCNC: 0.5 MG/DL (ref 0.1–1)
BNP SERPL-MCNC: 24 PG/ML (ref 0–99)
BUN SERPL-MCNC: 24 MG/DL (ref 8–23)
BZE UR QL SCN: ABNORMAL
CALCIUM SERPL-MCNC: 9.1 MG/DL (ref 8.7–10.5)
CANNABINOIDS UR QL SCN: NEGATIVE
CHLORIDE SERPL-SCNC: 106 MMOL/L (ref 95–110)
CO2 SERPL-SCNC: 23 MMOL/L (ref 23–29)
CREAT SERPL-MCNC: 1.8 MG/DL (ref 0.5–1.4)
CREAT UR-MCNC: 205.5 MG/DL (ref 23–375)
CTP QC/QA: YES
DIFFERENTIAL METHOD: ABNORMAL
EOSINOPHIL # BLD AUTO: 0.7 K/UL (ref 0–0.5)
EOSINOPHIL NFR BLD: 6.4 % (ref 0–8)
ERYTHROCYTE [DISTWIDTH] IN BLOOD BY AUTOMATED COUNT: 13.9 % (ref 11.5–14.5)
EST. GFR  (NO RACE VARIABLE): 41 ML/MIN/1.73 M^2
FERRITIN SERPL-MCNC: 112 NG/ML (ref 20–300)
GLUCOSE SERPL-MCNC: 113 MG/DL (ref 70–110)
HCT VFR BLD AUTO: 38.5 % (ref 40–54)
HGB BLD-MCNC: 12.4 G/DL (ref 14–18)
IMM GRANULOCYTES # BLD AUTO: 0.03 K/UL (ref 0–0.04)
IMM GRANULOCYTES NFR BLD AUTO: 0.3 % (ref 0–0.5)
INR PPP: 1 (ref 0.8–1.2)
INR PPP: 1 (ref 0.8–1.2)
IRON SERPL-MCNC: 40 UG/DL (ref 45–160)
LIPASE SERPL-CCNC: 59 U/L (ref 4–60)
LYMPHOCYTES # BLD AUTO: 3.9 K/UL (ref 1–4.8)
LYMPHOCYTES NFR BLD: 37.6 % (ref 18–48)
MCH RBC QN AUTO: 28.7 PG (ref 27–31)
MCHC RBC AUTO-ENTMCNC: 32.2 G/DL (ref 32–36)
MCV RBC AUTO: 89 FL (ref 82–98)
METHADONE UR QL SCN>300 NG/ML: NEGATIVE
MONOCYTES # BLD AUTO: 1.1 K/UL (ref 0.3–1)
MONOCYTES NFR BLD: 10.3 % (ref 4–15)
NEUTROPHILS # BLD AUTO: 4.7 K/UL (ref 1.8–7.7)
NEUTROPHILS NFR BLD: 45 % (ref 38–73)
NRBC BLD-RTO: 0 /100 WBC
OPIATES UR QL SCN: ABNORMAL
PCP UR QL SCN>25 NG/ML: NEGATIVE
PLATELET # BLD AUTO: 154 K/UL (ref 150–450)
PMV BLD AUTO: 12.7 FL (ref 9.2–12.9)
POCT GLUCOSE: 106 MG/DL (ref 70–110)
POCT GLUCOSE: 134 MG/DL (ref 70–110)
POCT GLUCOSE: 167 MG/DL (ref 70–110)
POTASSIUM SERPL-SCNC: 4.1 MMOL/L (ref 3.5–5.1)
PROT SERPL-MCNC: 7.7 G/DL (ref 6–8.4)
PROTHROMBIN TIME: 11.2 SEC (ref 9–12.5)
PROTHROMBIN TIME: 11.3 SEC (ref 9–12.5)
RBC # BLD AUTO: 4.32 M/UL (ref 4.6–6.2)
SARS-COV-2 RDRP RESP QL NAA+PROBE: NEGATIVE
SATURATED IRON: 11 % (ref 20–50)
SODIUM SERPL-SCNC: 139 MMOL/L (ref 136–145)
SODIUM UR-SCNC: 113 MMOL/L (ref 20–250)
TOTAL IRON BINDING CAPACITY: 379 UG/DL (ref 250–450)
TOXICOLOGY INFORMATION: ABNORMAL
TRANSFERRIN SERPL-MCNC: 256 MG/DL (ref 200–375)
TROPONIN I SERPL DL<=0.01 NG/ML-MCNC: 1.31 NG/ML (ref 0–0.03)
TROPONIN I SERPL DL<=0.01 NG/ML-MCNC: 1.55 NG/ML (ref 0–0.03)
TROPONIN I SERPL DL<=0.01 NG/ML-MCNC: 1.72 NG/ML (ref 0–0.03)
UUN UR-MCNC: 487 MG/DL (ref 140–1050)
WBC # BLD AUTO: 10.34 K/UL (ref 3.9–12.7)

## 2023-08-02 PROCEDURE — 93010 ELECTROCARDIOGRAM REPORT: CPT | Mod: ,,, | Performed by: INTERNAL MEDICINE

## 2023-08-02 PROCEDURE — 63600175 PHARM REV CODE 636 W HCPCS

## 2023-08-02 PROCEDURE — 99153 MOD SED SAME PHYS/QHP EA: CPT | Performed by: INTERNAL MEDICINE

## 2023-08-02 PROCEDURE — 83880 ASSAY OF NATRIURETIC PEPTIDE: CPT | Performed by: STUDENT IN AN ORGANIZED HEALTH CARE EDUCATION/TRAINING PROGRAM

## 2023-08-02 PROCEDURE — 83690 ASSAY OF LIPASE: CPT | Performed by: STUDENT IN AN ORGANIZED HEALTH CARE EDUCATION/TRAINING PROGRAM

## 2023-08-02 PROCEDURE — 25000003 PHARM REV CODE 250: Performed by: NURSE PRACTITIONER

## 2023-08-02 PROCEDURE — 63600175 PHARM REV CODE 636 W HCPCS: Performed by: STUDENT IN AN ORGANIZED HEALTH CARE EDUCATION/TRAINING PROGRAM

## 2023-08-02 PROCEDURE — 80307 DRUG TEST PRSMV CHEM ANLYZR: CPT

## 2023-08-02 PROCEDURE — 85025 COMPLETE CBC W/AUTO DIFF WBC: CPT | Performed by: STUDENT IN AN ORGANIZED HEALTH CARE EDUCATION/TRAINING PROGRAM

## 2023-08-02 PROCEDURE — 80053 COMPREHEN METABOLIC PANEL: CPT | Performed by: STUDENT IN AN ORGANIZED HEALTH CARE EDUCATION/TRAINING PROGRAM

## 2023-08-02 PROCEDURE — C1887 CATHETER, GUIDING: HCPCS | Performed by: INTERNAL MEDICINE

## 2023-08-02 PROCEDURE — C1769 GUIDE WIRE: HCPCS | Performed by: INTERNAL MEDICINE

## 2023-08-02 PROCEDURE — 25000242 PHARM REV CODE 250 ALT 637 W/ HCPCS: Performed by: STUDENT IN AN ORGANIZED HEALTH CARE EDUCATION/TRAINING PROGRAM

## 2023-08-02 PROCEDURE — 84484 ASSAY OF TROPONIN QUANT: CPT | Performed by: STUDENT IN AN ORGANIZED HEALTH CARE EDUCATION/TRAINING PROGRAM

## 2023-08-02 PROCEDURE — 25000003 PHARM REV CODE 250: Performed by: INTERNAL MEDICINE

## 2023-08-02 PROCEDURE — 84484 ASSAY OF TROPONIN QUANT: CPT | Mod: 91

## 2023-08-02 PROCEDURE — 99291 PR CRITICAL CARE, E/M 30-74 MINUTES: ICD-10-PCS | Mod: 25,,, | Performed by: INTERNAL MEDICINE

## 2023-08-02 PROCEDURE — C1894 INTRO/SHEATH, NON-LASER: HCPCS | Performed by: INTERNAL MEDICINE

## 2023-08-02 PROCEDURE — 85610 PROTHROMBIN TIME: CPT | Mod: 91 | Performed by: STUDENT IN AN ORGANIZED HEALTH CARE EDUCATION/TRAINING PROGRAM

## 2023-08-02 PROCEDURE — 93458 L HRT ARTERY/VENTRICLE ANGIO: CPT | Mod: 26,,, | Performed by: INTERNAL MEDICINE

## 2023-08-02 PROCEDURE — 87635 SARS-COV-2 COVID-19 AMP PRB: CPT | Performed by: STUDENT IN AN ORGANIZED HEALTH CARE EDUCATION/TRAINING PROGRAM

## 2023-08-02 PROCEDURE — 25000003 PHARM REV CODE 250: Performed by: STUDENT IN AN ORGANIZED HEALTH CARE EDUCATION/TRAINING PROGRAM

## 2023-08-02 PROCEDURE — 82728 ASSAY OF FERRITIN: CPT

## 2023-08-02 PROCEDURE — 93010 ELECTROCARDIOGRAM REPORT: CPT | Mod: 76,,, | Performed by: INTERNAL MEDICINE

## 2023-08-02 PROCEDURE — 96374 THER/PROPH/DIAG INJ IV PUSH: CPT

## 2023-08-02 PROCEDURE — 25500020 PHARM REV CODE 255: Performed by: INTERNAL MEDICINE

## 2023-08-02 PROCEDURE — 93458 PR CATH PLACE/CORON ANGIO, IMG SUPER/INTERP,W LEFT HEART VENTRICULOGRAPHY: ICD-10-PCS | Mod: 26,,, | Performed by: INTERNAL MEDICINE

## 2023-08-02 PROCEDURE — 99285 EMERGENCY DEPT VISIT HI MDM: CPT | Mod: 25

## 2023-08-02 PROCEDURE — 85730 THROMBOPLASTIN TIME PARTIAL: CPT | Performed by: STUDENT IN AN ORGANIZED HEALTH CARE EDUCATION/TRAINING PROGRAM

## 2023-08-02 PROCEDURE — 25000003 PHARM REV CODE 250

## 2023-08-02 PROCEDURE — 99152 MOD SED SAME PHYS/QHP 5/>YRS: CPT | Mod: ,,, | Performed by: INTERNAL MEDICINE

## 2023-08-02 PROCEDURE — 93005 ELECTROCARDIOGRAM TRACING: CPT

## 2023-08-02 PROCEDURE — 84540 ASSAY OF URINE/UREA-N: CPT

## 2023-08-02 PROCEDURE — 93458 L HRT ARTERY/VENTRICLE ANGIO: CPT | Performed by: INTERNAL MEDICINE

## 2023-08-02 PROCEDURE — 96375 TX/PRO/DX INJ NEW DRUG ADDON: CPT

## 2023-08-02 PROCEDURE — 99152 PR MOD CONSCIOUS SEDATION, SAME PHYS, 5+ YRS, FIRST 15 MIN: ICD-10-PCS | Mod: ,,, | Performed by: INTERNAL MEDICINE

## 2023-08-02 PROCEDURE — 99291 CRITICAL CARE FIRST HOUR: CPT | Mod: 25,,, | Performed by: INTERNAL MEDICINE

## 2023-08-02 PROCEDURE — 63600175 PHARM REV CODE 636 W HCPCS: Performed by: INTERNAL MEDICINE

## 2023-08-02 PROCEDURE — 84300 ASSAY OF URINE SODIUM: CPT

## 2023-08-02 PROCEDURE — 21400001 HC TELEMETRY ROOM

## 2023-08-02 PROCEDURE — 93010 EKG 12-LEAD: ICD-10-PCS | Mod: ,,, | Performed by: INTERNAL MEDICINE

## 2023-08-02 PROCEDURE — 99152 MOD SED SAME PHYS/QHP 5/>YRS: CPT | Performed by: INTERNAL MEDICINE

## 2023-08-02 PROCEDURE — 84466 ASSAY OF TRANSFERRIN: CPT

## 2023-08-02 PROCEDURE — 94761 N-INVAS EAR/PLS OXIMETRY MLT: CPT

## 2023-08-02 RX ORDER — NITROGLYCERIN 0.4 MG/1
0.4 TABLET SUBLINGUAL
Status: COMPLETED | OUTPATIENT
Start: 2023-08-02 | End: 2023-08-02

## 2023-08-02 RX ORDER — HEPARIN SOD,PORCINE/0.9 % NACL 1000/500ML
INTRAVENOUS SOLUTION INTRAVENOUS
Status: DISCONTINUED | OUTPATIENT
Start: 2023-08-02 | End: 2023-08-02 | Stop reason: HOSPADM

## 2023-08-02 RX ORDER — HYDROCODONE BITARTRATE AND ACETAMINOPHEN 5; 325 MG/1; MG/1
1 TABLET ORAL
Status: COMPLETED | OUTPATIENT
Start: 2023-08-02 | End: 2023-08-02

## 2023-08-02 RX ORDER — NITROGLYCERIN 20 MG/100ML
0-400 INJECTION INTRAVENOUS CONTINUOUS
Status: DISCONTINUED | OUTPATIENT
Start: 2023-08-02 | End: 2023-08-02

## 2023-08-02 RX ORDER — IBUPROFEN 200 MG
16 TABLET ORAL
Status: DISCONTINUED | OUTPATIENT
Start: 2023-08-02 | End: 2023-08-03 | Stop reason: HOSPADM

## 2023-08-02 RX ORDER — SODIUM CHLORIDE 9 MG/ML
INJECTION, SOLUTION INTRAVENOUS CONTINUOUS
Status: DISCONTINUED | OUTPATIENT
Start: 2023-08-02 | End: 2023-08-02

## 2023-08-02 RX ORDER — SODIUM CHLORIDE 0.9 % (FLUSH) 0.9 %
10 SYRINGE (ML) INJECTION EVERY 12 HOURS PRN
Status: DISCONTINUED | OUTPATIENT
Start: 2023-08-02 | End: 2023-08-03 | Stop reason: HOSPADM

## 2023-08-02 RX ORDER — CLOPIDOGREL BISULFATE 75 MG/1
300 TABLET ORAL
Status: COMPLETED | OUTPATIENT
Start: 2023-08-02 | End: 2023-08-02

## 2023-08-02 RX ORDER — INSULIN ASPART 100 [IU]/ML
1-10 INJECTION, SOLUTION INTRAVENOUS; SUBCUTANEOUS
Status: DISCONTINUED | OUTPATIENT
Start: 2023-08-02 | End: 2023-08-03 | Stop reason: HOSPADM

## 2023-08-02 RX ORDER — SODIUM CHLORIDE 9 MG/ML
INJECTION, SOLUTION INTRAVENOUS CONTINUOUS
Status: ACTIVE | OUTPATIENT
Start: 2023-08-02 | End: 2023-08-02

## 2023-08-02 RX ORDER — FENTANYL CITRATE 50 UG/ML
INJECTION, SOLUTION INTRAMUSCULAR; INTRAVENOUS
Status: DISCONTINUED | OUTPATIENT
Start: 2023-08-02 | End: 2023-08-02 | Stop reason: HOSPADM

## 2023-08-02 RX ORDER — TACROLIMUS 1 MG/1
1 CAPSULE ORAL EVERY EVENING
Status: DISCONTINUED | OUTPATIENT
Start: 2023-08-03 | End: 2023-08-03 | Stop reason: HOSPADM

## 2023-08-02 RX ORDER — NAPROXEN SODIUM 220 MG/1
81 TABLET, FILM COATED ORAL DAILY
Status: DISCONTINUED | OUTPATIENT
Start: 2023-08-03 | End: 2023-08-03 | Stop reason: HOSPADM

## 2023-08-02 RX ORDER — METOPROLOL SUCCINATE 50 MG/1
200 TABLET, EXTENDED RELEASE ORAL DAILY
Status: DISCONTINUED | OUTPATIENT
Start: 2023-08-03 | End: 2023-08-03 | Stop reason: HOSPADM

## 2023-08-02 RX ORDER — NAPROXEN SODIUM 220 MG/1
243 TABLET, FILM COATED ORAL
Status: COMPLETED | OUTPATIENT
Start: 2023-08-02 | End: 2023-08-02

## 2023-08-02 RX ORDER — HEPARIN SODIUM,PORCINE/D5W 25000/250
0-40 INTRAVENOUS SOLUTION INTRAVENOUS CONTINUOUS
Status: DISCONTINUED | OUTPATIENT
Start: 2023-08-02 | End: 2023-08-02

## 2023-08-02 RX ORDER — IODIXANOL 320 MG/ML
INJECTION, SOLUTION INTRAVASCULAR
Status: DISCONTINUED | OUTPATIENT
Start: 2023-08-02 | End: 2023-08-02 | Stop reason: HOSPADM

## 2023-08-02 RX ORDER — ATORVASTATIN CALCIUM 20 MG/1
20 TABLET, FILM COATED ORAL DAILY
Status: DISCONTINUED | OUTPATIENT
Start: 2023-08-03 | End: 2023-08-03

## 2023-08-02 RX ORDER — LIDOCAINE HYDROCHLORIDE 10 MG/ML
INJECTION, SOLUTION EPIDURAL; INFILTRATION; INTRACAUDAL; PERINEURAL
Status: DISCONTINUED | OUTPATIENT
Start: 2023-08-02 | End: 2023-08-02 | Stop reason: HOSPADM

## 2023-08-02 RX ORDER — NITROGLYCERIN 20 MG/100ML
5 INJECTION INTRAVENOUS CONTINUOUS
Status: DISCONTINUED | OUTPATIENT
Start: 2023-08-02 | End: 2023-08-02

## 2023-08-02 RX ORDER — LEVOTHYROXINE SODIUM 88 UG/1
88 TABLET ORAL
Status: DISCONTINUED | OUTPATIENT
Start: 2023-08-02 | End: 2023-08-03 | Stop reason: HOSPADM

## 2023-08-02 RX ORDER — CALCIUM CITRATE/VITAMIN D3 200MG-6.25
1 TABLET ORAL DAILY
COMMUNITY

## 2023-08-02 RX ORDER — HEPARIN SODIUM 1000 [USP'U]/ML
INJECTION, SOLUTION INTRAVENOUS; SUBCUTANEOUS
Status: DISCONTINUED | OUTPATIENT
Start: 2023-08-02 | End: 2023-08-02 | Stop reason: HOSPADM

## 2023-08-02 RX ORDER — GLUCAGON 1 MG
1 KIT INJECTION
Status: DISCONTINUED | OUTPATIENT
Start: 2023-08-02 | End: 2023-08-03 | Stop reason: HOSPADM

## 2023-08-02 RX ORDER — VERAPAMIL HYDROCHLORIDE 2.5 MG/ML
INJECTION, SOLUTION INTRAVENOUS
Status: DISCONTINUED | OUTPATIENT
Start: 2023-08-02 | End: 2023-08-02 | Stop reason: HOSPADM

## 2023-08-02 RX ORDER — TACROLIMUS 1 MG/1
2 CAPSULE ORAL EVERY MORNING
Status: DISCONTINUED | OUTPATIENT
Start: 2023-08-03 | End: 2023-08-03 | Stop reason: HOSPADM

## 2023-08-02 RX ORDER — NALOXONE HCL 0.4 MG/ML
0.02 VIAL (ML) INJECTION
Status: DISCONTINUED | OUTPATIENT
Start: 2023-08-02 | End: 2023-08-03 | Stop reason: HOSPADM

## 2023-08-02 RX ORDER — IBUPROFEN 200 MG
24 TABLET ORAL
Status: DISCONTINUED | OUTPATIENT
Start: 2023-08-02 | End: 2023-08-03 | Stop reason: HOSPADM

## 2023-08-02 RX ORDER — MIDAZOLAM HYDROCHLORIDE 1 MG/ML
INJECTION, SOLUTION INTRAMUSCULAR; INTRAVENOUS
Status: DISCONTINUED | OUTPATIENT
Start: 2023-08-02 | End: 2023-08-02 | Stop reason: HOSPADM

## 2023-08-02 RX ORDER — HYDROMORPHONE HYDROCHLORIDE 1 MG/ML
0.5 INJECTION, SOLUTION INTRAMUSCULAR; INTRAVENOUS; SUBCUTANEOUS
Status: COMPLETED | OUTPATIENT
Start: 2023-08-02 | End: 2023-08-02

## 2023-08-02 RX ADMIN — NITROGLYCERIN 10 MCG/MIN: 20 INJECTION INTRAVENOUS at 10:08

## 2023-08-02 RX ADMIN — NITROGLYCERIN 40 MCG/MIN: 20 INJECTION INTRAVENOUS at 10:08

## 2023-08-02 RX ADMIN — NITROGLYCERIN 20 MCG/MIN: 20 INJECTION INTRAVENOUS at 10:08

## 2023-08-02 RX ADMIN — HYDROCODONE BITARTRATE AND ACETAMINOPHEN 1 TABLET: 5; 325 TABLET ORAL at 07:08

## 2023-08-02 RX ADMIN — CLOPIDOGREL BISULFATE 300 MG: 75 TABLET ORAL at 07:08

## 2023-08-02 RX ADMIN — SODIUM CHLORIDE: 9 INJECTION, SOLUTION INTRAVENOUS at 12:08

## 2023-08-02 RX ADMIN — HYDROMORPHONE HYDROCHLORIDE 0.5 MG: 1 INJECTION, SOLUTION INTRAMUSCULAR; INTRAVENOUS; SUBCUTANEOUS at 09:08

## 2023-08-02 RX ADMIN — LEVOTHYROXINE SODIUM 88 MCG: 88 TABLET ORAL at 09:08

## 2023-08-02 RX ADMIN — SODIUM CHLORIDE: 9 INJECTION, SOLUTION INTRAVENOUS at 04:08

## 2023-08-02 RX ADMIN — ASPIRIN 81 MG CHEWABLE TABLET 243 MG: 81 TABLET CHEWABLE at 06:08

## 2023-08-02 RX ADMIN — NITROGLYCERIN 5 MCG/MIN: 20 INJECTION INTRAVENOUS at 10:08

## 2023-08-02 RX ADMIN — NITROGLYCERIN 7.5 MCG/MIN: 20 INJECTION INTRAVENOUS at 10:08

## 2023-08-02 RX ADMIN — INSULIN ASPART 1 UNITS: 100 INJECTION, SOLUTION INTRAVENOUS; SUBCUTANEOUS at 09:08

## 2023-08-02 RX ADMIN — NITROGLYCERIN 0.4 MG: 0.4 TABLET, ORALLY DISINTEGRATING SUBLINGUAL at 08:08

## 2023-08-02 RX ADMIN — HEPARIN SODIUM 12 UNITS/KG/HR: 10000 INJECTION, SOLUTION INTRAVENOUS at 09:08

## 2023-08-02 RX ADMIN — ISOSORBIDE DINITRATE: 20 TABLET ORAL at 08:08

## 2023-08-02 RX ADMIN — NITROGLYCERIN 30 MCG/MIN: 20 INJECTION INTRAVENOUS at 10:08

## 2023-08-02 NOTE — Clinical Note
Diagnosis: NSTEMI (non-ST elevated myocardial infarction) [186124]   Admitting Provider:: MARTI IZQUIERDO [[id number]]   Future Attending Provider: LESLIE SAUCEDO [[id number]]   Reason for IP Medical Treatment  (Clinical interventions that can only be accomplished in the IP setting? ) :: NSTEMI   I certify that Inpatient services for greater than or equal to 2 midnights are medically necessary:: Yes   Plans for Post-Acute care--if anticipated (pick the single best option):: A. No post acute care anticipated at this time

## 2023-08-02 NOTE — PROCEDURES
Brief Operative Note:    : Juan Vera MD     Referring Physician: Self,Aaareferral     All Operators: Surgeon(s):  Juan Vera MD     Preoperative Diagnosis: NSTEMI (non-ST elevated myocardial infarction) [I21.4]     Postop Diagnosis: NSTEMI (non-ST elevated myocardial infarction) [I21.4]    Treatments/Procedures: Procedure(s) (LRB):  Left heart cath (Left)  ANGIOGRAM, CORONARY ARTERY (N/A)    Access: Right radial artery    Findings: non obstructive coronary artery disease     See catheterization report for full details.    Intervention: none     See catheterization report for full details.    Closure device:  VascBand         Plan:  - Post cath protocol   - IVF @ 200 cc/kg/hr x 2 hours  - Bed rest x 2 hours   - Continue aspirin 81 mg daily indefinitely  - Consider one year DAPT due to NSTEMI  - Continue high intensity statin therapy (LDL goal < 70)  - Risk factor reduction (BP <130/80 mmHg, glycemic control, etc)  - Cardiac rehab referral  - Follow up with outpatient cardiologist    Estimated Blood loss: 20 cc    Specimens removed: Mala Burks MD

## 2023-08-02 NOTE — MEDICAL/APP STUDENT
[facility] Medicine H&P Note     Admitting Team: [facility] Hospitalist Team B  Attending Physician: Diego Lea MD  Resident:   Intern: ***     Date of Admit: 8/2/2023    Chief Complaint     Chest Pain and Headache (Mid-sternal chest pain onset at 0200. Reports having similar pain on Monday that was not as severe and went away on its own.     Subjective:      History of Present Illness:  Vick Gonzalez is a 67 y.o. male who  has a past medical history of Acute congestive heart failure (5/12/2023), Anemia, Anxiety, Cataract, Chronic pain syndrome (07/13/2011), CKD (chronic kidney disease), stage III, Diabetes mellitus type II, uncontrolled, Discitis of lumbosacral region (01/16/2015), ED (erectile dysfunction), Encounter for blood transfusion, Genital herpes, Gout, arthritis, History of alcohol abuse, History of hepatitis C, s/p successful Rx w/ SVR24 (cure) - 5/2018 (08/23/2011), History of positive PPD, treatment status unknown, History of substance abuse, Hypertension, Hypothyroidism, Liver replaced by transplant (08/23/2011), Pancreatitis (2016), Peptic ulcer disease, and Pulmonary emphysema, unspecified emphysema type (5/26/2023). The patient presented to [facility] on 8/2/2023 with a primary complaint of chest pain that began at 0200 today when he woke from sleep. He describes a stabbing pain located mid sternal of chest that is constant. He had a similar episode Monday but stated the pain was not as severe and went away on its own. When the chest pain started he began having a productive cough. Nothing has made the chest pain better. Patient states he has had some dizziness and shortness of breath.    The patient was in their usual state of health until this morning.    Past Medical History:  Past Medical History:   Diagnosis Date    Acute congestive heart failure 5/12/2023    Anemia     Anxiety     Cataract     Chronic pain syndrome 07/13/2011    CKD (chronic kidney disease), stage III      Diabetes mellitus type II, uncontrolled     Discitis of lumbosacral region 01/16/2015    ED (erectile dysfunction)     Encounter for blood transfusion     Genital herpes     Gout, arthritis     History of alcohol abuse     History of hepatitis C, s/p successful Rx w/ SVR24 (cure) - 5/2018 08/23/2011    Completed 24wks Epclusa + RBV w/SVR12 - 2/2018  -     History of positive PPD, treatment status unknown     Pulmonary granulomas, negative sputum cultures for AFB and indeterminate quantferon test    History of substance abuse     Hypertension     Hypothyroidism     Liver replaced by transplant 08/23/2011    DATE: 12/16/2013  LIVER BIOPSY : REASON:  hep C staging  PATHOLOGY COMMITTEE NOTE/PLAN: :grade  1 / stage 1        Pancreatitis 2016    Peptic ulcer disease     Pulmonary emphysema, unspecified emphysema type 5/26/2023       Past Surgical History:  Past Surgical History:   Procedure Laterality Date    ANTERIOR CERVICAL DISCECTOMY W/ FUSION N/A 8/22/2022    Procedure: Procedure: ACDF 4-7 LOS: 4.0 Anesthesia: General Blood: Type & Screen Radiology: C-Arm Microscope: ------- SNS: EMG, SEP, MEP Brace: Ponder Bed: Regular Bed, Shoulder Strap Headrest:------ Position: Supine Equipment: Depuy;  Surgeon: Titi hCristian MD;  Location: [facility] OR;  Service: Neurosurgery;  Laterality: N/A;  Depuy  confirmed CW 8/19  Neuro monitoring confirmed CW 8/19    CARPAL TUNNEL RELEASE Left 10/29/2021    Procedure: RELEASE, CARPAL TUNNEL;  Surgeon: Jameson Alejo Jr., MD;  Location: [facility] OR;  Service: Orthopedics;  Laterality: Left;    CHOLECYSTECTOMY      DECOMPRESSION OF CERVICAL SPINE BY ANTERIOR APPROACH WITH FUSION  8/22/2022    Procedure: DECOMPRESSION AND FUSION, SPINE, CERVICAL, ANTERIOR APPROACH;  Surgeon: Titi Christian MD;  Location: [facility] OR;  Service: Neurosurgery;;    INJECTION OF JOINT Right 12/2/2019    Procedure: INJECTION, JOINT SI;  Surgeon: Thu Penn MD;  Location: [facility]GT;  Service: Pain Management;   Laterality: Right;  RT SI JNT INJ    LIVER TRANSPLANT  06/2010    SPINE SURGERY      TRANSFORAMINAL EPIDURAL INJECTION OF STEROID Left 5/29/2023    Procedure: INJECTION, STEROID, EPIDURAL, TRANSFORAMINAL APPROACH,  LEFT C7-T1 DIRECT REF;  Surgeon: Thu Penn MD;  [facility];  Service: Pain Management;  Laterality: Left;  4/3 MD ILL/PT WILL C/B       Allergies:  Review of patient's allergies indicates:  No Known Allergies    Home Medications:  Prior to Admission medications    Medication Sig Start Date End Date Taking? Authorizing Provider   albuterol (VENTOLIN HFA) 90 mcg/actuation inhaler Inhale 2 puffs into the lungs every 6 (six) hours as needed for Wheezing or Shortness of Breath. Rescue 5/26/23 5/25/24 Yes Emanuel Lopez MD   allopurinoL (ZYLOPRIM) 100 MG tablet TAKE 1 TABLET BY MOUTH TWICE A DAY  Patient taking differently: Take 100 mg by mouth once daily. 8/29/22  Yes Emanuel Lopez MD   aluminum-magnesium hydroxide-simethicone (MAALOX) 200-200-20 mg/5 mL Susp Take 30 mLs by mouth 4 (four) times daily before meals and nightly.  Patient taking differently: Take 30 mLs by mouth every 6 (six) hours as needed. 2/24/22  Yes Elisha Ying PA-C   aspirin 81 MG Chew Take 1 tablet (81 mg total) by mouth once daily. 2/2/21  Yes Margarito Mahajan MD   calcium carbonate-vitamin D3 (CALCIUM 600 WITH VITAMIN D3) 600 mg(1,500mg) -400 unit Chew Take 1 tablet by mouth once daily.  6/27/12  Yes Historical Provider   cyanocobalamin (VITAMIN B-12) 100 MCG tablet Take 100 mcg by mouth once daily.   Yes Historical Provider   diphenhydrAMINE (BENADRYL) 25 mg capsule Take 25 mg by mouth daily as needed for Allergies (Cold).   Yes Historical Provider   furosemide (LASIX) 20 MG tablet Take 2 tablets (40 mg total) by mouth daily as needed (For Weight Gain > 2-3 lbs in 1 day or 4-6 lbs over 1 week notify PCP and take 40 mg daily for three days). 5/21/23 8/2/23 Yes Velasquez Navarrete MD  "  gabapentin (NEURONTIN) 800 MG tablet Take 1 tablet (800 mg total) by mouth 3 (three) times daily. 11/1/22  Yes Emanuel Lopez MD   insulin glargine U-300 conc (TOUJEO MAX U-300 SOLOSTAR) 300 unit/mL (3 mL) insulin pen Inject 40 Units into the skin once daily.  Patient taking differently: Inject 40 Units into the skin every evening. 1/24/22  Yes Adrián Arambula MD   insulin lispro (HUMALOG KWIKPEN INSULIN) 100 unit/mL pen Inject 20 Units into the skin 3 (three) times daily with meals. 1/3/23  Yes Emanuel Lopez MD   isosorbide-hydrALAZINE 20-37.5 mg (BIDIL) 20-37.5 mg Tab Take 1 tablet by mouth 3 (three) times daily. 5/21/23 5/20/24 Yes Velasquez Navarrete MD   lancets 30 gauge Misc 1 lancet by Misc.(Non-Drug; Combo Route) route 4 (four) times daily before meals and nightly. 4/22/22  Yes Emanuel Lopez MD   levothyroxine (SYNTHROID) 88 MCG tablet TAKE 1 TABLET BY MOUTH EVERY DAY  Patient taking differently: Take 88 mcg by mouth before breakfast. 2/15/23  Yes Emanuel Lopez MD   metoprolol succinate (TOPROL-XL) 200 MG 24 hr tablet TAKE 1 TABLET BY MOUTH EVERY DAY  Patient taking differently: Take 200 mg by mouth once daily. 5/3/23  Yes Neo Hdz MD   multivit with min-folic acid (MEN'S MULTIVITAMIN GUMMIES) 200 mcg Chew Take 1 tablet by mouth once daily.   Yes Historical Provider   NOVOFINE PLUS 32 gauge x 1/6" Ndle  12/8/17  Yes Historical Provider   rosuvastatin (CRESTOR) 5 MG tablet TAKE 1 TABLET BY MOUTH EVERY DAY  Patient taking differently: Take 5 mg by mouth once daily. 5/3/23  Yes Neo Hdz MD   sacubitriL-valsartan (ENTRESTO) 49-51 mg per tablet Take 1 tablet by mouth 2 (two) times daily. 7/24/23  Yes Mulu Robison MD   tacrolimus (PROGRAF) 1 MG Cap Take 2 capsules (2 mg total) by mouth every morning AND 1 capsule (1 mg total) every evening. 11/11/22  Yes James Coleman MD   traZODone (DESYREL) 50 MG tablet TAKE 1 TABLET BY MOUTH EVERY EVENING.  Patient " taking differently: Take 50 mg by mouth nightly as needed. 11/10/22  Yes Emanuel Lopez MD   multivitamin (THERAGRAN) per tablet Take 1 tablet by mouth once daily.  6/27/12 8/2/23 Yes Historical Provider   blood sugar diagnostic (TRUE METRIX GLUCOSE TEST STRIP) Strp USE 3 TIMES DAILY TO TEST BLOOD GLUCOSE LEVEL 7/1/22   Emanuel Lopez MD   blood-glucose meter Misc 1 Device by Misc.(Non-Drug; Combo Route) route 3 (three) times daily. 9/7/22 9/7/23  Taryn Corcoran MD   blood-glucose meter,continuous (DEXCOM G6 ) Misc Use with [employer] g6 system (transmitter,sensor) 5/26/23   Emanuel Lopez MD   blood-glucose sensor (DEXCOM G6 SENSOR) Viviane Use as directed 3/14/23   Emaneul Lopez MD   blood-glucose transmitter (DEXCOM G6 TRANSMITTER) Viviane Use as directed 5/1/23   Jayson Winters MD   insulin aspart U-100 (NOVOLOG FLEXPEN U-100 INSULIN) 100 unit/mL (3 mL) InPn pen Inject 20 Units into the skin 3 (three) times daily with meals. 2/23/21 12/9/21  Emanuel Lopez MD       Family History:  Family History   Problem Relation Age of Onset    Cancer Mother     Diabetes Mother     Heart disease Mother     Diabetes Sister     Cancer Maternal Uncle 82        colon CA    Drug abuse Daughter     Melanoma Neg Hx     Psoriasis Neg Hx     Lupus Neg Hx     Eczema Neg Hx     Acne Neg Hx        Social History:  Social History     Tobacco Use    Smoking status: Former     Current packs/day: 0.00     Passive exposure: Never    Smokeless tobacco: Never   Substance Use Topics    Alcohol use: No     Comment: over 5 years ago, none currently    Drug use: Not Currently     Comment: Former cocaine use       Review of Systems:  Neck: pain on left side  Cardiovascular: positive for chest pain  Pulmonary: productive cough    All other systems are reviewed and are negative.    Immunizations:   TDap ***    Flu ***   Pna ***    Cancer Screening:  Colonoscopy: ***     Objective:   Last 24 Hour Vital Signs:  BP  Min: 89/58  " Max: 143/84  Temp  Av.8 °F (36.6 °C)  Min: 97.7 °F (36.5 °C)  Max: 97.9 °F (36.6 °C)  Pulse  Av.8  Min: 65  Max: 79  Resp  Av.9  Min: 10  Max: 34  SpO2  Av.4 %  Min: 95 %  Max: 100 %  Height  Av' 1.5" (186.7 cm)  Min: 6' 1" (185.4 cm)  Max: 6' 2" (188 cm)  Weight  Av.4 kg (254 lb 8.1 oz)  Min: 112.5 kg (248 lb 0.3 oz)  Max: 118.4 kg (261 lb)  Body mass index is 31.84 kg/m².  No intake/output data recorded.    Physical Examination:  /62   Pulse 70   Temp 97.7 °F (36.5 °C) (Oral)   Resp 20   Ht 6' 2" (1.88 m)   Wt 112.5 kg (248 lb 0.3 oz)   SpO2 98%   BMI 31.84 kg/m²     General Appearance:    Alert, cooperative, no distress, appears stated age   Head:    Normocephalic, without obvious abnormality, atraumatic           Nose:   No drainage       Neck:   Supple, JVD, pain on left side       Lungs:     Clear to auscultation bilaterally, respirations unlabored   Chest wall:    Point tenderness mid sternal   Heart:    Regular rate and rhythm     Laboratory:  Most Recent Data:  CBC:   Lab Results   Component Value Date    WBC 10.34 2023    HGB 12.4 (L) 2023    HCT 38.5 (L) 2023     2023    MCV 89 2023    RDW 13.9 2023     WBC Differential: *** % N, *** % Bands, *** % L, *** % M, *** % Eo, *** % Baso, *** additional cells seen  BMP:   Lab Results   Component Value Date     2023    K 4.1 2023     2023    CO2 23 2023    BUN 24 (H) 2023    CREATININE 1.8 (H) 2023     (H) 2023    CALCIUM 9.1 2023    MG 1.7 2023    PHOS 5.9 (H) 2023     LFTs:   Lab Results   Component Value Date    PROT 7.7 2023    ALBUMIN 4.1 2023    BILITOT 0.5 2023    AST 23 2023    ALKPHOS 71 2023    ALT 21 2023     (H) 2017     Coags:   Lab Results   Component Value Date    INR 1.0 2023     FLP:   Lab Results   Component Value Date    CHOL " "156 05/13/2023    HDL 29 (L) 05/13/2023    LDLCALC 84.4 05/13/2023    TRIG 213 (H) 05/13/2023    CHOLHDL 18.6 (L) 05/13/2023     DM:   Lab Results   Component Value Date    HGBA1C 6.8 (H) 05/13/2023    HGBA1C 7.8 (H) 12/05/2022    HGBA1C 9.7 (H) 08/16/2022    LDLCALC 84.4 05/13/2023    CREATININE 1.8 (H) 08/02/2023     Thyroid:   Lab Results   Component Value Date    TSH 1.288 05/13/2023    FREET4 1.05 05/13/2023     Anemia:   Lab Results   Component Value Date    IRON 43 (L) 05/13/2023    TIBC 187 (L) 12/15/2008    FERRITIN 112 08/02/2023    [id number] 725 06/18/2013    FOLATE 17.8 06/18/2013     Cardiac:   Lab Results   Component Value Date    TROPONINI 1.715 (H) 08/02/2023    BNP 24 08/02/2023     Urinalysis:   Lab Results   Component Value Date    LABURIN No growth 03/27/2017    COLORU Yellow 05/12/2023    SPECGRAV 1.020 05/12/2023    NITRITE Negative 05/12/2023    KETONESU Negative 05/12/2023    UROBILINOGEN Negative 02/23/2021    WBCUA 2 02/24/2022       Trended Lab Data:  Recent Labs   Lab 08/02/23  0637   WBC 10.34   HGB 12.4*   HCT 38.5*      MCV 89   RDW 13.9      K 4.1      CO2 23   BUN 24*   CREATININE 1.8*   *   PROT 7.7   ALBUMIN 4.1   BILITOT 0.5   AST 23   ALKPHOS 71   ALT 21       Trended Cardiac Data:  Recent Labs   Lab 08/02/23  0637 08/02/23  1116   TROPONINI 1.550* 1.715*   BNP 24  --            Other Results:  EKG (my interpretation): {ekg findings:[id number]::"normal EKG, normal sinus rhythm","unchanged from previous tracings"}.    Radiology:  Imaging Results              X-Ray Chest PA And Lateral (Final result)  Result time 08/02/23 07:57:58      Final result by Therese Martinez MD (08/02/23 07:57:58)                   Impression:      No finding to correlate with the history      Electronically signed by: Therese Martinez MD  Date:    08/02/2023  Time:    07:57               Narrative:    EXAMINATION:  XR CHEST PA AND LATERAL    CLINICAL HISTORY:  Chest " Pain;    TECHNIQUE:  PA and lateral views of the chest were performed.    COMPARISON:  May 12, 2023    FINDINGS:  The osseous structures demonstrate incompletely imaged surgical changes within the cervical spine. Heart size is not enlarged. There is no significant pleural effusion. Lungs are clear.                                           Assessment:     Vick Gonzalez is a 67 y.o. male with PMH CHF, uncontrolled type II diabetes, CKD stage III, hyperlipidemia. Patient presents with non radiating mid sternal chest pain. EKG showed no ST elevations. Troponin was 1.71 at arrival and was 1.55 at last check. Concerned for NSTEMI and consulted cardiology. Cardiology sent to cath lab.  Plan:   Chest pain- NSTEMI  - Consulted cardiology. Currently in cath lab, will reassess plan after procedure.  - UDS positive for cocaine    Elevated troponin   -Continue to monitor troponin levels until normalizes.            Renée Puente  3rd year med student

## 2023-08-02 NOTE — SUBJECTIVE & OBJECTIVE
Past Medical History:   Diagnosis Date    Acute congestive heart failure 5/12/2023    Anemia     Anxiety     Cataract     Chronic pain syndrome 07/13/2011    CKD (chronic kidney disease), stage III     Diabetes mellitus type II, uncontrolled     Discitis of lumbosacral region 01/16/2015    ED (erectile dysfunction)     Encounter for blood transfusion     Genital herpes     Gout, arthritis     History of alcohol abuse     History of hepatitis C, s/p successful Rx w/ SVR24 (cure) - 5/2018 08/23/2011    Completed 24wks Epclusa + RBV w/SVR12 - 2/2018  -     History of positive PPD, treatment status unknown     Pulmonary granulomas, negative sputum cultures for AFB and indeterminate quantferon test    History of substance abuse     Hypertension     Hypothyroidism     Liver replaced by transplant 08/23/2011    DATE: 12/16/2013  LIVER BIOPSY : REASON:  hep C staging  PATHOLOGY COMMITTEE NOTE/PLAN: :grade  1 / stage 1        Pancreatitis 2016    Peptic ulcer disease     Pulmonary emphysema, unspecified emphysema type 5/26/2023       Past Surgical History:   Procedure Laterality Date    ANTERIOR CERVICAL DISCECTOMY W/ FUSION N/A 8/22/2022    Procedure: Procedure: ACDF 4-7 LOS: 4.0 Anesthesia: General Blood: Type & Screen Radiology: C-Arm Microscope: ------- SNS: EMG, SEP, MEP Brace: Art Bed: Regular Bed, Shoulder Strap Headrest:------ Position: Supine Equipment: Depuy;  Surgeon: Titi Christian MD;  Location: [facility];  Service: Neurosurgery;  Laterality: N/A;  Depuy  confirmed CW 8/19  Neuro monitoring confirmed CW 8/19    CARPAL TUNNEL RELEASE Left 10/29/2021    Procedure: RELEASE, CARPAL TUNNEL;  Surgeon: Jameson Alejo Jr., MD;  Location: [facility] OR;  Service: Orthopedics;  Laterality: Left;    CHOLECYSTECTOMY      DECOMPRESSION OF CERVICAL SPINE BY ANTERIOR APPROACH WITH FUSION  8/22/2022    Procedure: DECOMPRESSION AND FUSION, SPINE, CERVICAL, ANTERIOR APPROACH;  Surgeon: Titi Christian MD;  Location: [facility];   Service: Neurosurgery;;    INJECTION OF JOINT Right 12/2/2019    Procedure: INJECTION, JOINT SI;  Surgeon: Thu Penn MD;  Location: [facility];  Service: Pain Management;  Laterality: Right;  RT SI JNT INJ    LIVER TRANSPLANT  06/2010    SPINE SURGERY      TRANSFORAMINAL EPIDURAL INJECTION OF STEROID Left 5/29/2023    Procedure: INJECTION, STEROID, EPIDURAL, TRANSFORAMINAL APPROACH,  LEFT C7-T1 DIRECT REF;  Surgeon: Thu Penn MD;  Location: [facility];  Service: Pain Management;  Laterality: Left;  4/3 MD ILL/PT WILL C/B       Review of patient's allergies indicates:  No Known Allergies    No current facility-administered medications on file prior to encounter.     Current Outpatient Medications on File Prior to Encounter   Medication Sig    albuterol (VENTOLIN HFA) 90 mcg/actuation inhaler Inhale 2 puffs into the lungs every 6 (six) hours as needed for Wheezing or Shortness of Breath. Rescue    allopurinoL (ZYLOPRIM) 100 MG tablet TAKE 1 TABLET BY MOUTH TWICE A DAY    aluminum-magnesium hydroxide-simethicone (MAALOX) 200-200-20 mg/5 mL Susp Take 30 mLs by mouth 4 (four) times daily before meals and nightly. (Patient taking differently: Take 30 mLs by mouth every 6 (six) hours as needed.)    aspirin 81 MG Chew Take 1 tablet (81 mg total) by mouth once daily.    blood sugar diagnostic (TRUE METRIX GLUCOSE TEST STRIP) Strp USE 3 TIMES DAILY TO TEST BLOOD GLUCOSE LEVEL    blood-glucose meter Misc 1 Device by Misc.(Non-Drug; Combo Route) route 3 (three) times daily.    blood-glucose meter,continuous (DEXCOM G6 ) Misc Use with Whyd g6 system (transmitter,sensor)    blood-glucose sensor (DEXCOM G6 SENSOR) Viviane Use as directed    blood-glucose transmitter (DEXCOM G6 TRANSMITTER) Viviane Use as directed    calcium carbonate-vitamin D3 (CALCIUM 600 WITH VITAMIN D3) 600 mg(1,500mg) -400 unit Chew Take 1 tablet by mouth once daily.     CYANOCOBALAMIN, VITAMIN B-12, ORAL Chew 1 tablet by mouth 2 to 3 times a  "month.    diphenhydrAMINE (BENADRYL) 25 mg capsule Take 25 mg by mouth daily as needed for Allergies (Cold).    gabapentin (NEURONTIN) 800 MG tablet Take 1 tablet (800 mg total) by mouth 3 (three) times daily.    insulin glargine U-300 conc (TOUJEO MAX U-300 SOLOSTAR) 300 unit/mL (3 mL) insulin pen Inject 40 Units into the skin once daily.    insulin lispro (HUMALOG KWIKPEN INSULIN) 100 unit/mL pen Inject 20 Units into the skin 3 (three) times daily with meals.    isosorbide-hydrALAZINE 20-37.5 mg (BIDIL) 20-37.5 mg Tab Take 1 tablet by mouth 3 (three) times daily.    lancets 30 gauge Misc 1 lancet by Misc.(Non-Drug; Combo Route) route 4 (four) times daily before meals and nightly.    levothyroxine (SYNTHROID) 88 MCG tablet TAKE 1 TABLET BY MOUTH EVERY DAY    metoprolol succinate (TOPROL-XL) 200 MG 24 hr tablet TAKE 1 TABLET BY MOUTH EVERY DAY    multivitamin (THERAGRAN) per tablet Take 1 tablet by mouth once daily.     NOVOFINE PLUS 32 gauge x 1/6" Ndle     rosuvastatin (CRESTOR) 5 MG tablet TAKE 1 TABLET BY MOUTH EVERY DAY    sacubitriL-valsartan (ENTRESTO) 49-51 mg per tablet Take 1 tablet by mouth 2 (two) times daily.    tacrolimus (PROGRAF) 1 MG Cap Take 2 capsules (2 mg total) by mouth every morning AND 1 capsule (1 mg total) every evening.    traZODone (DESYREL) 50 MG tablet TAKE 1 TABLET BY MOUTH EVERY EVENING.    furosemide (LASIX) 20 MG tablet Take 2 tablets (40 mg total) by mouth daily as needed (For Weight Gain > 2-3 lbs in 1 day or 4-6 lbs over 1 week notify PCP and take 40 mg daily for three days).    [DISCONTINUED] insulin aspart U-100 (NOVOLOG FLEXPEN U-100 INSULIN) 100 unit/mL (3 mL) InPn pen Inject 20 Units into the skin 3 (three) times daily with meals.     Family History       Problem Relation (Age of Onset)    Cancer Mother, Maternal Uncle (82)    Diabetes Mother, Sister    Drug abuse Daughter    Heart disease Mother          Tobacco Use    Smoking status: Former     Current packs/day: 0.00     " Passive exposure: Never    Smokeless tobacco: Never   Substance and Sexual Activity    Alcohol use: No     Comment: over 5 years ago, none currently    Drug use: Not Currently     Comment: Former cocaine use    Sexual activity: Yes     Review of Systems   Constitutional: Negative. Negative for diaphoresis.   HENT: Negative.     Eyes: Negative.    Cardiovascular:  Positive for chest pain and dyspnea on exertion. Negative for irregular heartbeat, leg swelling, near-syncope, orthopnea, palpitations, paroxysmal nocturnal dyspnea and syncope.   Respiratory:  Positive for cough and shortness of breath.    Endocrine: Negative.    Hematologic/Lymphatic: Negative.    Skin: Negative.    Musculoskeletal:  Positive for arthritis, back pain and myalgias.   Gastrointestinal: Negative.  Negative for nausea and vomiting.   Genitourinary: Negative.    Neurological: Negative.  Negative for dizziness, light-headedness and weakness.   Psychiatric/Behavioral: Negative.     Allergic/Immunologic: Negative.      Objective:     Vital Signs (Most Recent):  Temp: 97.9 °F (36.6 °C) (08/02/23 0621)  Pulse: 69 (08/02/23 0902)  Resp: (!) 28 (08/02/23 0902)  BP: 112/75 (08/02/23 0902)  SpO2: 97 % (08/02/23 0902) Vital Signs (24h Range):  Temp:  [97.9 °F (36.6 °C)] 97.9 °F (36.6 °C)  Pulse:  [68-79] 69  Resp:  [10-28] 28  SpO2:  [97 %-100 %] 97 %  BP: (112-143)/(74-84) 112/75     Weight: 118.4 kg (261 lb)  Body mass index is 34.43 kg/m².    SpO2: 97 %       No intake or output data in the 24 hours ending 08/02/23 1011    Lines/Drains/Airways       Peripheral Intravenous Line  Duration                  Peripheral IV - Single Lumen 08/02/23 0629 20 G Anterior;Left;Proximal Forearm <1 day         Peripheral IV - Single Lumen 08/02/23 0801 20 G Right Antecubital <1 day                     Physical Exam  Constitutional:       General: He is not in acute distress.     Appearance: He is not diaphoretic.   HENT:      Head: Atraumatic.   Eyes:      General:  "        Right eye: No discharge.         Left eye: No discharge.   Cardiovascular:      Rate and Rhythm: Normal rate and regular rhythm.   Pulmonary:      Effort: Pulmonary effort is normal.      Breath sounds: Normal breath sounds.   Abdominal:      General: Bowel sounds are normal.      Palpations: Abdomen is soft.   Musculoskeletal:      Right lower leg: No edema.      Left lower leg: No edema.   Skin:     General: Skin is warm and dry.   Neurological:      Mental Status: He is alert and oriented to person, place, and time.          Significant Labs: BMP:   Recent Labs   Lab 08/02/23  0637   *      K 4.1      CO2 23   BUN 24*   CREATININE 1.8*   CALCIUM 9.1   , CMP   Recent Labs   Lab 08/02/23  0637      K 4.1      CO2 23   *   BUN 24*   CREATININE 1.8*   CALCIUM 9.1   PROT 7.7   ALBUMIN 4.1   BILITOT 0.5   ALKPHOS 71   AST 23   ALT 21   ANIONGAP 10   , CBC   Recent Labs   Lab 08/02/23  0637   WBC 10.34   HGB 12.4*   HCT 38.5*      , INR   Recent Labs   Lab 08/02/23  0637 08/02/23  0807   INR 1.0 1.0   , Lipid Panel No results for input(s): "CHOL", "HDL", "LDLCALC", "TRIG", "CHOLHDL" in the last 48 hours., Troponin   Recent Labs   Lab 08/02/23  0637   TROPONINI 1.550*   , and All pertinent lab results from the last 24 hours have been reviewed.    Significant Imaging: Echocardiogram: Transthoracic echo (TTE) complete (Cupid Only):   Results for orders placed or performed during the hospital encounter of 05/12/23   Echo Saline Bubble? Yes   Result Value Ref Range    AV mean gradient 3 mmHg    Ao peak mark anthony 1.23 m/s    Ao VTI 25.62 cm    IVRT 79.92 msec    IVS 1.14 (A) 0.6 - 1.1 cm    LA size 3.50 cm    Left Atrium Major Axis 4.89 cm    Left Atrium Minor Axis 5.05 cm    LVIDd 4.05 3.5 - 6.0 cm    LVIDs 2.78 2.1 - 4.0 cm    LVOT peak VTI 18.94 cm    Posterior Wall 1.00 0.6 - 1.1 cm    MV Peak A Mark Anthony 0.69 m/s    E wave deceleration time 114.00 msec    MV Peak E Mark Anthony 0.71 m/s    " "PV Peak D Mark Anthony 0.23 m/s    PV Peak S Mark Anthony 0.34 m/s    RA Major Axis 4.06 cm    RA Width 3.76 cm    RVDD 3.51 cm    TAPSE 2.77 cm    LA WIDTH 4.13 cm    MV stenosis pressure 1/2 time 33.06 ms    LV Diastolic Volume 71.95 mL    LV Systolic Volume 29.06 mL    LVOT peak mark anthony 0.88 m/s    TDI LATERAL 0.10 m/s    TDI SEPTAL 0.10 m/s    LA volume (mod) 61.95 cm3    MV "A" wave duration 15.13 msec    LV LATERAL E/E' RATIO 7.10 m/s    LV SEPTAL E/E' RATIO 7.10 m/s    FS 31 %    LA volume 61.05 cm3    LV mass 142.66 g    Left Ventricle Relative Wall Thickness 0.49 cm    AV Velocity Ratio 0.72     AV index (prosthetic) 0.74     MV valve area p 1/2 method 6.65 cm2    E/A ratio 1.03     Mean e' 0.10 m/s    Pulm vein S/D ratio 1.48     AV peak gradient 6 mmHg    E/E' ratio 7.10 m/s    LV Systolic Volume Index 12.0 mL/m2    LV Diastolic Volume Index 29.73 mL/m2    LA Volume Index 25.2 mL/m2    LV Mass Index 59 g/m2    LA Volume Index (Mod) 25.6 mL/m2    BSA 2.48 m2    Right Atrial Pressure (from IVC) 8 mmHg    EF 60 %    RV S' 13 cm/s    Narrative    · Technically difficult study  · The left ventricle is normal in size with concentric remodeling and   normal systolic function. The estimated ejection fraction is 60%.  · Normal right ventricular size with normal right ventricular systolic   function.  · Normal left ventricular diastolic function.  · Intermediate central venous pressure (8 mmHg).  · There is no evidence of intracardiac shunting.        "

## 2023-08-02 NOTE — ASSESSMENT & PLAN NOTE
EKG SR without acute ischemic changes  Chest pain cw ACS- ongoing- Tridil gtt initiated  Initial troponin 1.5  TTE pending  NPO for LHC  Loaded with DAPT and on a heparin gtt  BB and statin continued

## 2023-08-02 NOTE — CONSULTS
"ICU Progress Note  [facility]    Patient Name: Vick Gonzalez  MRN: [id number]  Admit Date: 8/2/2023  Today's Date: 08/02/2023  Attending Physician: Dr. Cardenas      SUBJECTIVE:     HPI: No notes on file      Patient seen and examined this morning.     Interval History: Patient admitted and stepped up to the ICU on heparin and nitro drip 2/2 to chest pain/NSTEMI.    Review of Systems   Constitutional:  Negative for diaphoresis and fever.   Respiratory:  Negative for shortness of breath.    Cardiovascular:  Positive for chest pain. Negative for palpitations.   Gastrointestinal:  Negative for abdominal pain, nausea and vomiting.   Musculoskeletal:  Positive for back pain.   Neurological:  Positive for headaches.       OBJECTIVE:     Vital Signs Trends/Hx Reviewed  Vitals:    08/02/23 1231 08/02/23 1240 08/02/23 1242 08/02/23 1330   BP: 113/72  (!) 89/58 (!) 140/75   BP Location:    Right arm   Patient Position:    Lying   Pulse: 73  66 72   Resp: (!) 34  (!) 26 (!) 22   Temp:  97.7 °F (36.5 °C)  97.7 °F (36.5 °C)   TempSrc:  Oral  Oral   SpO2: 95%  95% 96%   Weight:    112.5 kg (248 lb 0.3 oz)   Height:    6' 2" (1.88 m)                Physical Exam  Vitals and nursing note reviewed.   Constitutional:       General: He is not in acute distress.     Appearance: Normal appearance. He is obese. He is not ill-appearing, toxic-appearing or diaphoretic.   HENT:      Head: Normocephalic.      Right Ear: External ear normal.      Left Ear: External ear normal.      Nose: Nose normal.      Mouth/Throat:      Pharynx: Oropharynx is clear.   Eyes:      Extraocular Movements: Extraocular movements intact.   Cardiovascular:      Rate and Rhythm: Normal rate and regular rhythm.      Pulses: Normal pulses.      Heart sounds: Normal heart sounds.   Pulmonary:      Effort: Pulmonary effort is normal. No respiratory distress.      Breath sounds: Normal breath sounds. No wheezing or rhonchi.   Chest:      Chest " "wall: No tenderness.   Abdominal:      General: Abdomen is flat. There is no distension.      Palpations: Abdomen is soft.      Tenderness: There is no abdominal tenderness. There is no guarding or rebound.   Musculoskeletal:         General: Normal range of motion.      Cervical back: Normal range of motion.      Right lower leg: Edema present.      Left lower leg: Edema present.   Skin:     General: Skin is warm.      Capillary Refill: Capillary refill takes less than 2 seconds.   Neurological:      General: No focal deficit present.      Mental Status: He is alert and oriented to person, place, and time. Mental status is at baseline.   Psychiatric:         Mood and Affect: Mood normal.         Behavior: Behavior normal.         Thought Content: Thought content normal.         Laboratory:  Recent Labs   Lab 08/02/23  0637   WBC 10.34   RBC 4.32*   HGB 12.4*   HCT 38.5*      MCV 89   MCH 28.7   MCHC 32.2     Recent Labs   Lab 08/02/23  0637      K 4.1      CO2 23   BUN 24*   CREATININE 1.8*   CALCIUM 9.1     No results for input(s): "PH", "PCO2", "PO2", "HCO3", "POCSATURATED", "BE" in the last 24 hours.      Imaging:   EXAMINATION:  XR CHEST PA AND LATERAL     CLINICAL HISTORY:  Chest Pain;     TECHNIQUE:  PA and lateral views of the chest were performed.     COMPARISON:  May 12, 2023     FINDINGS:  The osseous structures demonstrate incompletely imaged surgical changes within the cervical spine. Heart size is not enlarged. There is no significant pleural effusion. Lungs are clear.     Impression:     No finding to correlate with the history        Electronically signed by: Therese Martinez MD  Date:                                            08/02/2023  Time:                                           07:57    ASSESSMENT   Mr. Gonzalez is a 67 y.o. male who has a past medical history of Acute congestive heart failure (5/12/2023), Anemia, Anxiety, Cataract, Chronic pain syndrome (07/13/2011), CKD " (chronic kidney disease), stage III, Diabetes mellitus type II, uncontrolled, Discitis of lumbosacral region (01/16/2015), ED (erectile dysfunction), Encounter for blood transfusion, Genital herpes, Gout, arthritis, History of alcohol abuse, History of hepatitis C, s/p successful Rx w/ SVR24 (cure) - 5/2018 (08/23/2011), History of positive PPD, treatment status unknown, History of substance abuse, Hypertension, Hypothyroidism, Liver replaced by transplant (08/23/2011), Pancreatitis (2016), Peptic ulcer disease, and Pulmonary emphysema, unspecified emphysema type (5/26/2023).       Admitted for NSTEMI. Chest pain currently well controlled on heparin and nitro gtt. Patient pending Cath procedure today by Cardiology. Otherwise, remains stable at this time.    PLAN     Neuro/Psych  - AAOx3  - No acute concerns at this time.     CV  #NSTEMI  - EKG sinus rhythm without acute ischemic changes  - Chest pain ongoing  - Initial troponin 1.550. Subsequent increased 1.715.  - BNP within normal limits 24.  - Echo pending.  - NPO for left heart cath  - DAPT loaded  - Initiated on heparin, nitro gtt  - Cardiology consulted, appreciate recommendations.    #HLD  #PAD  #HTN  - Continue home medications (statin, aspirin, ARB, BB, entresto).    Pulm  - Patient with adequate O2 sats on room air and in no apparent respiratory distress.  - No acute concerns at this time.    FEN/GI  F: IV NS  E: Na 139, K 4.1, Cl 106, CO2 23  N: NPO (pending possible cath)    - Recommend obtaining Mg, Ph  - No acute concerns at this time.     RENAL  UOP: cc , In: cc, net cc in last 24 hrs  BUN/Cr: 24/1.8, baseline Cr 1.0-1.4  Continue to monitor renal status and urine output     #OSMANY, in setting of CKD  - Likely 2/2 pre-renal etiology in setting of ischemic event and/or hypoperfusion.  - IVF recommended by cardiology.  - Hold nephrotoxic medications .  - Renally-dose current meds if available  - Bladder scan if suspicion of retention, followed by  retroperitoneal US to evaluate for Hydronephrosis. Giordano if retention.   - Avoid Hypotension.  - Strict I/O's.  - Monitor renal function carefully.    Heme  H/H 12.4/38.5; stable  WBC 10.34  DVT prophylaxis: Heparin (gtt)  PT 11.3  INR 1.0  aPTT 27.0  - No acute concerns at this time.    Endo  #T2DM  - Last A1c 6.8 (May 2023)  - Recommend repeat Hgb A1c.  - POCT BG testing.  - Recommend SSI while inpatient.  - BG Goal 140-180.    ID  - Afebrile. No leukocytosis.  - No acute concerns at this time.    PPx  F: none  A: PRN  S: none  T: heparin gtt  H: >30 degrees  U: NA  G: BG Goal 140-180  S: NA  B: NA  I: NA  D: NA      CODE STATUS: Full Code  DISPO: On heparin and nitro gtt for NSTEMI. Pending Cath procedure.    ________________________  Tanner Barahona MD  [facility] PGY-2

## 2023-08-02 NOTE — ED NOTES
[place] from Cath Lab at bedside instructed Dr. Herr ordered to increase Nitro to 40 mcg as long as patient can tolerated. Patient reports pain 9/10 vss. Nitro increased per MD instructions.

## 2023-08-02 NOTE — ED NOTES
Pain medication and Nitro administered.Pain reassessed no change. Patient requested to speak to physician.Doctor Denney notified and will reassess.

## 2023-08-02 NOTE — H&P
[facility] Medicine H&P Note     Admitting Team: [facility] Hospitalist Team B  Attending Physician: Diego Lea MD  Resident: Freddy  Intern: Anatsacia     Date of Admit: 8/2/2023    Chief Complaint     Chest pain x6 hours    Subjective:      History of Present Illness:  Vick Gonzalez is  68 yo male with a PMHx of HTN, HLD, CHF, Anemia, CKD II, uncontrolled T2DM, Gout, Hep C s/p treatment, Liver transplant (2011), and Pulmonary Emphysema who presented to [facility] on 8/2/2023 for chest pain that had begun around 2:30 AM.     Patient was in their usually state of health until Monday night when he had an episode of chest pain which resolved on its own. At this time he also had a productive cough which has persisted. This episode was followed by another episode of chest pain Tuesday night 8/1 around 2:30 AM which he noticed when he awoke in the middle of the night. The chest pain was substernal, non-radiating, and stabbing in nature. It was not exacerbated by inspiration although it was made worse by touching the area of his chest. Patient also reported symptoms of tingling in his left arm and neck pain although this is chronic and was told it was a associated with nerve entrapment.    In the ED, EKG showed non-ischemic changes, and his troponin was 1550. Patient was given plavix and aspirin, then started on a heparin drip. Nitroglycerin sublingual was ineffective in relieving his pain, so a nitroglycerin drip was started. Cardiology was consulted and patient was taken to Cath Lab for procedure.    He denies fevers, chills, changes in vision, nausea, vomiting, abdominal pain, or leg pain. Also denies any recent sick contacts.    Past Medical History:  Past Medical History:   Diagnosis Date    Acute congestive heart failure 5/12/2023    Anemia     Anxiety     Cataract     Chronic pain syndrome 07/13/2011    CKD (chronic kidney disease), stage III     Diabetes mellitus type II, uncontrolled     Discitis of lumbosacral region  01/16/2015    ED (erectile dysfunction)     Encounter for blood transfusion     Genital herpes     Gout, arthritis     History of alcohol abuse     History of hepatitis C, s/p successful Rx w/ SVR24 (cure) - 5/2018 08/23/2011    Completed 24wks Epclusa + RBV w/SVR12 - 2/2018  -     History of positive PPD, treatment status unknown     Pulmonary granulomas, negative sputum cultures for AFB and indeterminate quantferon test    History of substance abuse     Hypertension     Hypothyroidism     Liver replaced by transplant 08/23/2011    DATE: 12/16/2013  LIVER BIOPSY : REASON:  hep C staging  PATHOLOGY COMMITTEE NOTE/PLAN: :grade  1 / stage 1        Pancreatitis 2016    Peptic ulcer disease     Pulmonary emphysema, unspecified emphysema type 5/26/2023       Past Surgical History:  Past Surgical History:   Procedure Laterality Date    ANTERIOR CERVICAL DISCECTOMY W/ FUSION N/A 8/22/2022    Procedure: Procedure: ACDF 4-7 LOS: 4.0 Anesthesia: General Blood: Type & Screen Radiology: C-Arm Microscope: ------- SNS: EMG, SEP, MEP Brace: Calhoun Bed: Regular Bed, Shoulder Strap Headrest:------ Position: Supine Equipment: Depuy;  Surgeon: Titi Christian MD;  Location: [facility] OR;  Service: Neurosurgery;  Laterality: N/A;  Depuy  confirmed CW 8/19  Neuro monitoring confirmed CW 8/19    CARPAL TUNNEL RELEASE Left 10/29/2021    Procedure: RELEASE, CARPAL TUNNEL;  Surgeon: Jameson Alejo Jr., MD;  Location: [facility] OR;  Service: Orthopedics;  Laterality: Left;    CHOLECYSTECTOMY      DECOMPRESSION OF CERVICAL SPINE BY ANTERIOR APPROACH WITH FUSION  8/22/2022    Procedure: DECOMPRESSION AND FUSION, SPINE, CERVICAL, ANTERIOR APPROACH;  Surgeon: Titi Christian MD;  Location: [facility] OR;  Service: Neurosurgery;;    INJECTION OF JOINT Right 12/2/2019    Procedure: INJECTION, JOINT SI;  Surgeon: Thu Penn MD;  Location: [facility] PAIN MGT;  Service: Pain Management;  Laterality: Right;  RT SI JNT INJ    LIVER TRANSPLANT  06/2010    SPINE SURGERY       TRANSFORAMINAL EPIDURAL INJECTION OF STEROID Left 5/29/2023    Procedure: INJECTION, STEROID, EPIDURAL, TRANSFORAMINAL APPROACH,  LEFT C7-T1 DIRECT REF;  Surgeon: Thu Penn MD;  Location: [facility] PAIN T;  Service: Pain Management;  Laterality: Left;  4/3 MD ILL/PT WILL C/B       Allergies:  Review of patient's allergies indicates:  No Known Allergies    Home Medications:  Prior to Admission medications    Medication Sig Start Date End Date Taking? Authorizing Provider   albuterol (VENTOLIN HFA) 90 mcg/actuation inhaler Inhale 2 puffs into the lungs every 6 (six) hours as needed for Wheezing or Shortness of Breath. Rescue 5/26/23 5/25/24 Yes Emanuel Lopez MD   allopurinoL (ZYLOPRIM) 100 MG tablet TAKE 1 TABLET BY MOUTH TWICE A DAY  Patient taking differently: Take 100 mg by mouth once daily. 8/29/22  Yes Emanuel Lopez MD   aluminum-magnesium hydroxide-simethicone (MAALOX) 200-200-20 mg/5 mL Susp Take 30 mLs by mouth 4 (four) times daily before meals and nightly.  Patient taking differently: Take 30 mLs by mouth every 6 (six) hours as needed. 2/24/22  Yes Elisha Ying PA-C   aspirin 81 MG Chew Take 1 tablet (81 mg total) by mouth once daily. 2/2/21  Yes Margarito Mahajan MD   calcium carbonate-vitamin D3 (CALCIUM 600 WITH VITAMIN D3) 600 mg(1,500mg) -400 unit Chew Take 1 tablet by mouth once daily.  6/27/12  Yes Historical Provider   cyanocobalamin (VITAMIN B-12) 100 MCG tablet Take 100 mcg by mouth once daily.   Yes Historical Provider   diphenhydrAMINE (BENADRYL) 25 mg capsule Take 25 mg by mouth daily as needed for Allergies (Cold).   Yes Historical Provider   furosemide (LASIX) 20 MG tablet Take 2 tablets (40 mg total) by mouth daily as needed (For Weight Gain > 2-3 lbs in 1 day or 4-6 lbs over 1 week notify PCP and take 40 mg daily for three days). 5/21/23 8/2/23 Yes Velasquez Navarrete MD   gabapentin (NEURONTIN) 800 MG tablet Take 1 tablet (800 mg total) by mouth 3 (three)  "times daily. 11/1/22  Yes Emanuel Lopez MD   insulin glargine U-300 conc (TOUJEO MAX U-300 SOLOSTAR) 300 unit/mL (3 mL) insulin pen Inject 40 Units into the skin once daily.  Patient taking differently: Inject 40 Units into the skin every evening. 1/24/22  Yes Adrián Arambula MD   insulin lispro (HUMALOG KWIKPEN INSULIN) 100 unit/mL pen Inject 20 Units into the skin 3 (three) times daily with meals. 1/3/23  Yes Emanuel Lopez MD   isosorbide-hydrALAZINE 20-37.5 mg (BIDIL) 20-37.5 mg Tab Take 1 tablet by mouth 3 (three) times daily. 5/21/23 5/20/24 Yes Velasquez Navarrete MD   lancets 30 gauge Misc 1 lancet by Misc.(Non-Drug; Combo Route) route 4 (four) times daily before meals and nightly. 4/22/22  Yes Emanuel Lopez MD   levothyroxine (SYNTHROID) 88 MCG tablet TAKE 1 TABLET BY MOUTH EVERY DAY  Patient taking differently: Take 88 mcg by mouth before breakfast. 2/15/23  Yes Emanuel Lopez MD   metoprolol succinate (TOPROL-XL) 200 MG 24 hr tablet TAKE 1 TABLET BY MOUTH EVERY DAY  Patient taking differently: Take 200 mg by mouth once daily. 5/3/23  Yes Neo Hdz MD   multivit with min-folic acid (MEN'S MULTIVITAMIN GUMMIES) 200 mcg Chew Take 1 tablet by mouth once daily.   Yes Historical Provider   NOVOFINE PLUS 32 gauge x 1/6" Ndle  12/8/17  Yes Historical Provider   rosuvastatin (CRESTOR) 5 MG tablet TAKE 1 TABLET BY MOUTH EVERY DAY  Patient taking differently: Take 5 mg by mouth once daily. 5/3/23  Yes Neo Hdz MD   sacubitriL-valsartan (ENTRESTO) 49-51 mg per tablet Take 1 tablet by mouth 2 (two) times daily. 7/24/23  Yes Mulu Robison MD   tacrolimus (PROGRAF) 1 MG Cap Take 2 capsules (2 mg total) by mouth every morning AND 1 capsule (1 mg total) every evening. 11/11/22  Yes James Coleman MD   traZODone (DESYREL) 50 MG tablet TAKE 1 TABLET BY MOUTH EVERY EVENING.  Patient taking differently: Take 50 mg by mouth nightly as needed. 11/10/22  Yes Emanuel Aflonso " MD John   multivitamin (THERAGRAN) per tablet Take 1 tablet by mouth once daily.  12 Yes Historical Provider   blood sugar diagnostic (TRUE METRIX GLUCOSE TEST STRIP) Strp USE 3 TIMES DAILY TO TEST BLOOD GLUCOSE LEVEL 22   Emanuel Lopez MD   blood-glucose meter Misc 1 Device by Misc.(Non-Drug; Combo Route) route 3 (three) times daily. 22  Taryn Corcoran MD   blood-glucose meter,continuous (DEXCOM G6 ) Misc Use with Cerevo g6 system (transmitter,sensor) 23   Emanuel Lopez MD   blood-glucose sensor (DEXCOM G6 SENSOR) Viviane Use as directed 3/14/23   Emanuel Lopez MD   blood-glucose transmitter (DEXCOM G6 TRANSMITTER) Viviane Use as directed 23   Jayson Winters MD   insulin aspart U-100 (NOVOLOG FLEXPEN U-100 INSULIN) 100 unit/mL (3 mL) InPn pen Inject 20 Units into the skin 3 (three) times daily with meals. 21  Emanuel Lopez MD       Family History:  Mother: cancer, diabetes, heart disease  Uncle: colon cancer    Social History:  Social History     Tobacco Use    Smoking status: Former     Current packs/day: 0.00     Passive exposure: Never    Smokeless tobacco: Never   Substance Use Topics    Alcohol use: No     Comment: over 5 years ago, none currently    Drug use: Not Currently     Comment: Former cocaine use       Review of Systems:    Constitutional: positive for headache  Respiratory: positive for SOB, chest pain  Cardiovascular: negative for palpitations, negative for lightheadedness or syncope  Gastrointestinal: negative for nausea, vomiting, abdominal pain    All other systems reviewed were negative aside from in above history.    Health Maintaince :   Primary Care Physician:  Dr. Lopez    Immunizations:   Tdap: 10/11/2021   Pna: UTD     Cancer Screening:  Colonoscopy: UTD - , repeat in 10 years     Objective:   Last 24 Hour Vital Signs:  BP  Min: 89/58  Max: 143/84  Temp  Av.8 °F (36.6 °C)  Min: 97.7 °F (36.5 °C)  Max:  "97.9 °F (36.6 °C)  Pulse  Av.8  Min: 65  Max: 79  Resp  Av.9  Min: 10  Max: 34  SpO2  Av.4 %  Min: 95 %  Max: 100 %  Height  Av' 1.5" (186.7 cm)  Min: 6' 1" (185.4 cm)  Max: 6' 2" (188 cm)  Weight  Av.4 kg (254 lb 8.1 oz)  Min: 112.5 kg (248 lb 0.3 oz)  Max: 118.4 kg (261 lb)  Body mass index is 31.84 kg/m².  No intake/output data recorded.    Physical Examination:  Physical Exam   Constitutional:   Patient appeared mildly uncomfortable    HENT:   Head: Normocephalic and atraumatic.   Eyes: Pupils are equal, round, and reactive to light. Conjunctivae are normal.   Cardiovascular: Normal rate, regular rhythm, normal heart sounds and normal pulses. Exam reveals no gallop and no friction rub.   No murmur heard.Pulmonary:      Effort: Pulmonary effort is normal.      Breath sounds: Normal breath sounds.     Abdominal: Soft. Bowel sounds are normal.   Musculoskeletal:         General: No swelling. Normal range of motion.   Neurological: He is alert.   Skin: Skin is warm and dry.         Laboratory:  Most Recent Data:    Trended Lab Data:  Recent Labs   Lab 23  0637   WBC 10.34   HGB 12.4*   HCT 38.5*      MCV 89   RDW 13.9      K 4.1      CO2 23   BUN 24*   CREATININE 1.8*   *   PROT 7.7   ALBUMIN 4.1   BILITOT 0.5   AST 23   ALKPHOS 71   ALT 21     Ca: 9.1    WBC Differential: 45 % N, 0.3 % Bands, 37.6 % L, 10.3 % M, 6.4 % Eo, 0.4 % Baso      Coags:   Lab Results   Component Value Date    INR 1.0 2023     Ferritin: 112    Trended Cardiac Data:  Recent Labs   Lab 23  0637 23  1116   TROPONINI 1.550* 1.715*   BNP 24  --        Other Results:  EKG (my interpretation):   Normal Sinus rhythm  Nonspecific T wave abnormalities      Radiology:    CXR (23)  FINDINGS:  The osseous structures demonstrate incompletely imaged surgical changes within the cervical spine. Heart size is not enlarged. There is no significant pleural effusion. Lungs are " clear.         Assessment:     Vick Gonzalez is  68 yo male with a PMHx of CHF, anemia, CKD III, uncontrolled T2DM, Gout, Hep C s/p treatment, liver transplant (2011), BPH, and pulmonary emphysema who presented to [facility] on 8/2/2023 for stabbing, substernal, non-radiating chest pain that began overnight. He was found to have an NSTEMI with a troponin of 1.55 and subsequently taken to the Cath lab for procedure. Now in ICU on nitro drip and heparin gtt.    Plan:     Chest Pain, NSTEMI   - Presented with stabbing, non-radiating substernal chest pain x 6 hours  - In the ED, troponin was 1.55 and EKG showed sign of no ischemic process  - Was aspirin and plavix loaded and heparin gtt, nitroglycerin drip started due to sublingual nitroglycerin not relieving chest pain  - Cardiology consulted and heart cath will be performed today  - Patient to be placed in ICU for monitoring and nitroglycerin drip   - Echocardiogram ordered to assess heart function  - UDS presumed + for cocaine and opiates    Elevated Troponin  - Troponin 1.55 in ED, on repeat was 1.715 at 1116  - Trend until peak    Acute Kidney Injury on Chronic Kidney Disease Stage III  - BUN/Cr: 24/1.8 on admission, baseline Cr around 1.3  - Last measured GFR - 41  - Will hold entresto and allopurinol in setting of OSMANY  - UA ordered to assess etiology    Type 2 Diabetes with long term insulin use  - Last A1C was 6.8 on 5/13/23  - SSI for glucose control while in the hospital; based on sliding scale needs will add basal coverage  - Home regimen glargine 40 units at night and lispro 20 untis with meals    Hyperlipidemia associated with Type 2 Diabetes  - Home medication include rosuvastatin 5 mg daily  - Atorvastatin 20 mg daily while inpatient as alternative    Hypertension associated with Type 2 Diabetes  - Continue home regimen of Metoprolol Succinate 200 mg daily, Hydralazine 35 mg, Isosorbide dinitrate 20 mg 3 times daily    Acquired Hypothyroidism  - Continue  home Levothyroxine 88 mcg, daily, before breakfast    PAD  - Per chart review, has history of PAD  - Once cardiac intervention complete, will continue ASA and Plavix    Gout   - Home regimen is Allopurinol; holding in setting of OSMANY    History of Liver transplant; 2011  - Tacrolimus 1 mg every evening  - Tacrolimus 2 mg every morning  - Likely in setting of Hep C s/p treatment with Epclusa - unclear in chart review          Code Status:     Code Status: Full Code  DVT Ppx: Heparin gtt  Diet: NPO for left heart cath    Franko Galaviz MD  [facility] Internal Medicine HO-I    [facility] Medicine Hospitalist Pager numbers:   [facility] Hospitalist Medicine Team A (Constance/Hammad): [phone]  [facility] Hospitalist Medicine Team B (Leonora/Donna):  [phone]

## 2023-08-02 NOTE — Clinical Note
The site was marked. The right groin and right radial was prepped. The site was prepped with ChloraPrep. The site was clipped. The patient was draped.

## 2023-08-02 NOTE — ED NOTES
Heparin infusion started and APTT lab draw was scheduled to be drawn in 6hours, will adjust medication per nomogram.

## 2023-08-02 NOTE — ED PROVIDER NOTES
NAME:  Vick Gonzalez  CSN:     [id number]  MRN:    [id number]  ADMIT DATE: 8/2/2023        eMERGENCY dEPARTMENT eNCOUnter    CHIEF COMPLAINT    Chief Complaint   Patient presents with    Chest Pain    Headache     Mid-sternal chest pain onset early this AM. Reports similar episode a few week ago that resolved on is own. Thought pain r/t gas, but pain is worsening and has not resolved. Hx of CHF and DM. Hx of back surgery. Reports coughing spell. Denies nausea/vomiting.       HPI    Vick Gonzalez is a 67 y.o. male with a past medical history of  has a past medical history of Acute congestive heart failure (5/12/2023), Anemia, Anxiety, Cataract, Chronic pain syndrome (07/13/2011), CKD (chronic kidney disease), stage III, Diabetes mellitus type II, uncontrolled, Discitis of lumbosacral region (01/16/2015), ED (erectile dysfunction), Encounter for blood transfusion, Genital herpes, Gout, arthritis, History of alcohol abuse, History of hepatitis C, s/p successful Rx w/ SVR24 (cure) - 5/2018 (08/23/2011), History of positive PPD, treatment status unknown, History of substance abuse, Hypertension, Hypothyroidism, Liver replaced by transplant (08/23/2011), Pancreatitis (2016), Peptic ulcer disease, and Pulmonary emphysema, unspecified emphysema type (5/26/2023).     he presents to the ED due to sharp chest pain across his chest and radiating to the left neck since around 2:00 a.m. this morning.  States he did take a baby aspirin at that time without improvement.  Notes pain persisted throughout the night and he decided to come in to get reassessed.  Has had mild productive cough with this.  Denies any increased pain or swelling to the bilateral lower extremities.  No fevers.  Has not had pain like this previously.  Does note acute on chronic low back pain.     Remote history of liver transplant.      HPI       PAST MEDICAL HISTORY  Past Medical History:   Diagnosis Date    Acute congestive heart failure 5/12/2023     Anemia     Anxiety     Cataract     Chronic pain syndrome 07/13/2011    CKD (chronic kidney disease), stage III     Diabetes mellitus type II, uncontrolled     Discitis of lumbosacral region 01/16/2015    ED (erectile dysfunction)     Encounter for blood transfusion     Genital herpes     Gout, arthritis     History of alcohol abuse     History of hepatitis C, s/p successful Rx w/ SVR24 (cure) - 5/2018 08/23/2011    Completed 24wks Epclusa + RBV w/SVR12 - 2/2018  -     History of positive PPD, treatment status unknown     Pulmonary granulomas, negative sputum cultures for AFB and indeterminate quantferon test    History of substance abuse     Hypertension     Hypothyroidism     Liver replaced by transplant 08/23/2011    DATE: 12/16/2013  LIVER BIOPSY : REASON:  hep C staging  PATHOLOGY COMMITTEE NOTE/PLAN: :grade  1 / stage 1        Pancreatitis 2016    Peptic ulcer disease     Pulmonary emphysema, unspecified emphysema type 5/26/2023       SURGICAL HISTORY    Past Surgical History:   Procedure Laterality Date    ANTERIOR CERVICAL DISCECTOMY W/ FUSION N/A 8/22/2022    Procedure: Procedure: ACDF 4-7 LOS: 4.0 Anesthesia: General Blood: Type & Screen Radiology: C-Arm Microscope: ------- SNS: EMG, SEP, MEP Brace: [place] Bed: Regular Bed, Shoulder Strap Headrest:------ Position: Supine Equipment: [employer];  Surgeon: Titi Christian MD;  Location: [facility] OR;  Service: Neurosurgery;  Laterality: N/A;  Depuy  confirmed CW 8/19  Neuro monitoring confirmed CW 8/19    CARPAL TUNNEL RELEASE Left 10/29/2021    Procedure: RELEASE, CARPAL TUNNEL;  Surgeon: Jameson Alejo Jr., MD;  Location: [facility] OR;  Service: Orthopedics;  Laterality: Left;    CHOLECYSTECTOMY      DECOMPRESSION OF CERVICAL SPINE BY ANTERIOR APPROACH WITH FUSION  8/22/2022    Procedure: DECOMPRESSION AND FUSION, SPINE, CERVICAL, ANTERIOR APPROACH;  Surgeon: Titi Christian MD;  Location: [facility] OR;  Service: Neurosurgery;;    INJECTION OF JOINT Right 12/2/2019     Procedure: INJECTION, JOINT SI;  Surgeon: Thu Penn MD;  Location: [facility] PAIN MGT;  Service: Pain Management;  Laterality: Right;  RT SI JNT INJ    LIVER TRANSPLANT  06/2010    SPINE SURGERY      TRANSFORAMINAL EPIDURAL INJECTION OF STEROID Left 5/29/2023    Procedure: INJECTION, STEROID, EPIDURAL, TRANSFORAMINAL APPROACH,  LEFT C7-T1 DIRECT REF;  Surgeon: Thu Penn MD;  Location: [facility] PAIN [facility]GT;  Service: Pain Management;  Laterality: Left;  4/3 MD ILL/PT WILL C/B       FAMILY HISTORY    Family History   Problem Relation Age of Onset    Cancer Mother     Diabetes Mother     Heart disease Mother     Diabetes Sister     Cancer Maternal Uncle 82        colon CA    Drug abuse Daughter     Melanoma Neg Hx     Psoriasis Neg Hx     Lupus Neg Hx     Eczema Neg Hx     Acne Neg Hx        SOCIAL HISTORY    Social History     Socioeconomic History    Marital status:    Tobacco Use    Smoking status: Former     Current packs/day: 0.00     Passive exposure: Never    Smokeless tobacco: Never   Substance and Sexual Activity    Alcohol use: No     Comment: over 5 years ago, none currently    Drug use: Not Currently     Comment: Former cocaine use    Sexual activity: Yes     Social Determinants of Health     Financial Resource Strain: Low Risk  (5/15/2023)    Overall Financial Resource Strain (CARDIA)     Difficulty of Paying Living Expenses: Not very hard   Food Insecurity: No Food Insecurity (5/15/2023)    Hunger Vital Sign     Worried About Running Out of Food in the Last Year: Never true     Ran Out of Food in the Last Year: Never true   Transportation Needs: No Transportation Needs (5/15/2023)    PRAPARE - Transportation     Lack of Transportation (Medical): No     Lack of Transportation (Non-Medical): No   Physical Activity: Sufficiently Active (5/15/2023)    Exercise Vital Sign     Days of Exercise per Week: 7 days     Minutes of Exercise per Session: 30 min   Stress: No Stress Concern Present (5/15/2023)     [facility] [employer] [facility] - Occupational Stress Questionnaire     Feeling of Stress : Not at all   Social Connections: Moderately Isolated (5/15/2023)    Social Connection and Isolation Panel [NHANES]     Frequency of Communication with Friends and Family: Three times a week     Frequency of Social Gatherings with Friends and Family: Three times a week     Attends Protestant Services: More than 4 times per year     Active Member of Clubs or Organizations: No     Attends Club or Organization Meetings: Never     Marital Status:    Housing Stability: Low Risk  (5/15/2023)    Housing Stability Vital Sign     Unable to Pay for Housing in the Last Year: No     Number of Places Lived in the Last Year: 1     Unstable Housing in the Last Year: No       MEDICATIONS  Current Outpatient Medications   Medication Instructions    albuterol (VENTOLIN HFA) 90 mcg/actuation inhaler 2 puffs, Inhalation, Every 6 hours PRN, Rescue    allopurinoL (ZYLOPRIM) 100 MG tablet TAKE 1 TABLET BY MOUTH TWICE A DAY    aluminum-magnesium hydroxide-simethicone (MAALOX) 200-200-20 mg/5 mL Susp 30 mLs, Oral, Before meals & nightly    aspirin 81 mg, Oral, Daily    blood sugar diagnostic (TRUE METRIX GLUCOSE TEST STRIP) Strp USE 3 TIMES DAILY TO TEST BLOOD GLUCOSE LEVEL    blood-glucose meter Misc 1 Device, Misc.(Non-Drug; Combo Route), 3 times daily    blood-glucose meter,continuous (DEXCOM G6 ) Misc Use with [employer] g6 system (transmitter,sensor)    blood-glucose sensor (DEXCOM G6 SENSOR) Viviane Use as directed    blood-glucose transmitter (DEXCOM G6 TRANSMITTER) Viviane Use as directed    calcium carbonate-vitamin D3 (CALCIUM 600 WITH VITAMIN D3) 600 mg(1,500mg) -400 unit Chew 1 tablet, Oral, Daily    CYANOCOBALAMIN, VITAMIN B-12, ORAL Chew 1 tablet by mouth 2 to 3 times a month.    diphenhydrAMINE (BENADRYL) 25 mg, Oral, Daily PRN    furosemide (LASIX) 40 mg, Oral, Daily PRN    gabapentin (NEURONTIN) 800 mg, Oral, 3 times  "daily    insulin lispro (HUMALOG KWIKPEN INSULIN) 20 Units, Subcutaneous, 3 times daily with meals    isosorbide-hydrALAZINE 20-37.5 mg (BIDIL) 20-37.5 mg Tab 1 tablet, Oral, 3 times daily    lancets 30 gauge Misc 1 lancet , Misc.(Non-Drug; Combo Route), Before meals & nightly    levothyroxine (SYNTHROID) 88 MCG tablet TAKE 1 TABLET BY MOUTH EVERY DAY    metoprolol succinate (TOPROL-XL) 200 MG 24 hr tablet TAKE 1 TABLET BY MOUTH EVERY DAY    multivitamin (THERAGRAN) per tablet 1 tablet, Oral, Daily    NOVOFINE PLUS 32 gauge x 1/6" Ndle No dose, route, or frequency recorded.    rosuvastatin (CRESTOR) 5 MG tablet TAKE 1 TABLET BY MOUTH EVERY DAY    sacubitriL-valsartan (ENTRESTO) 49-51 mg per tablet 1 tablet, Oral, 2 times daily    tacrolimus (PROGRAF) 1 MG Cap Take 2 capsules (2 mg total) by mouth every morning AND 1 capsule (1 mg total) every evening.    TOUJEO MAX U-300 SOLOSTAR 40 Units, Subcutaneous, Daily    traZODone (DESYREL) 50 MG tablet TAKE 1 TABLET BY MOUTH EVERY EVENING.       ALLERGIES    Review of patient's allergies indicates:  No Known Allergies      REVIEW OF SYSTEMS   Review of Systems       PHYSICAL EXAM    Reviewed Triage Note    VITAL SIGNS:   ED Triage Vitals [08/02/23 0621]   Enc Vitals Group      BP (!) 143/84      Pulse 79      Resp 20      Temp 97.9 °F (36.6 °C)      Temp Source Oral      SpO2 100 %      Weight 261 lb      Height 6' 1"      Head Circumference       Peak Flow       Pain Score       Pain Loc       Pain Edu?       Excl. in GC?        Patient Vitals for the past 24 hrs:   BP Temp Temp src Pulse Resp SpO2 Height Weight   08/02/23 1023 -- 97.7 °F (36.5 °C) Oral -- -- -- -- --   08/02/23 1015 122/73 -- -- 67 14 97 % -- --   08/02/23 0902 112/75 -- -- 69 (!) 28 97 % -- --   08/02/23 0901 -- -- -- -- 18 -- -- --   08/02/23 0847 130/78 -- -- 68 10 97 % -- --   08/02/23 0810 128/74 -- -- 70 (!) 22 100 % -- --   08/02/23 0726 -- -- -- -- 20 -- -- --   08/02/23 0621 (!) 143/84 97.9 °F " "(36.6 °C) Oral 79 20 100 % 6' 1" (1.854 m) 118.4 kg (261 lb)       Physical Exam    Nursing note and vitals reviewed.  Constitutional: He appears well-developed and well-nourished.   HENT:   Head: Normocephalic and atraumatic.   Eyes: EOM are normal. Pupils are equal, round, and reactive to light.   Neck: Neck supple.   Normal range of motion.  Cardiovascular:  Normal rate and regular rhythm.           Pulmonary/Chest: Breath sounds normal. No respiratory distress.   Abdominal: Abdomen is soft. There is no abdominal tenderness.   Musculoskeletal:         General: No edema. Normal range of motion.      Cervical back: Normal range of motion and neck supple.     Neurological: He is alert and oriented to person, place, and time.   Skin: Skin is warm and dry.   Psychiatric: He has a normal mood and affect.          EKG     Interpreted by EM physician if performed:   Normal sinus rhythm. No ST elevation or depression.  No T-wave inversions.  No evidence of ischemia. Rate of 76      ECG Results              EKG 12-lead (Final result)  Result time 08/02/23 10:23:51      Final result by Interface, Lab In [facility] (08/02/23 10:23:51)                   Narrative:    Test Reason : R07.9,    Vent. Rate : 076 BPM     Atrial Rate : 076 BPM     P-R Int : 170 ms          QRS Dur : 088 ms      QT Int : 380 ms       P-R-T Axes : 070 062 104 degrees     QTc Int : 427 ms    Normal sinus rhythm  Nonspecific T wave abnormality  Abnormal ECG  When compared with ECG of 12-MAY-2023 19:06,  Nonspecific T wave abnormality no longer evident in Inferior leads  Confirmed by Tito SCHWARTZ MD, Sandoval MEADOWS ([id number]) on 8/2/2023 10:23:44 AM    Referred By: [email or web address]   SELF           Confirmed By:Sandoval Francis III, MD                                      LABS  Pertinent labs reviewed. (See chart for details)   Labs Reviewed   CBC W/ AUTO DIFFERENTIAL - Abnormal; Notable for the following components:       Result Value    RBC 4.32 (*)     Hemoglobin 12.4 " (*)     Hematocrit 38.5 (*)     Mono # 1.1 (*)     Eos # 0.7 (*)     All other components within normal limits   COMPREHENSIVE METABOLIC PANEL - Abnormal; Notable for the following components:    Glucose 113 (*)     BUN 24 (*)     Creatinine 1.8 (*)     eGFR 41 (*)     All other components within normal limits   TROPONIN I - Abnormal; Notable for the following components:    Troponin I 1.550 (*)     All other components within normal limits   B-TYPE NATRIURETIC PEPTIDE   PROTIME-INR   LIPASE   APTT    Narrative:     Draw baseline aPTT prior to starting the heparin bolus or  infusion  (if patient is on warfarin prior to heparin therapy)   PROTIME-INR    Narrative:     Draw baseline aPTT prior to starting the heparin bolus or  infusion  (if patient is on warfarin prior to heparin therapy)   DRUG SCREEN PANEL, URINE EMERGENCY   IRON AND TIBC   FERRITIN   TROPONIN I   TROPONIN I   APTT   SARS-COV-2 RDRP GENE   POCT GLUCOSE MONITORING CONTINUOUS         RADIOLOGY          Imaging Results              X-Ray Chest PA And Lateral (Final result)  Result time 08/02/23 07:57:58      Final result by Therese Martinez MD (08/02/23 07:57:58)                   Impression:      No finding to correlate with the history      Electronically signed by: Therese Martinez MD  Date:    08/02/2023  Time:    07:57               Narrative:    EXAMINATION:  XR CHEST PA AND LATERAL    CLINICAL HISTORY:  Chest Pain;    TECHNIQUE:  PA and lateral views of the chest were performed.    COMPARISON:  May 12, 2023    FINDINGS:  The osseous structures demonstrate incompletely imaged surgical changes within the cervical spine. Heart size is not enlarged. There is no significant pleural effusion. Lungs are clear.                                        PROCEDURES    Critical Care    Date/Time: 8/2/2023 9:42 AM    Performed by: Carina Alba DO  Authorized by: Carina Alba DO  Direct patient critical care time: 15 minutes  Additional history critical  care time: 7 minutes  Ordering / reviewing critical care time: 10 minutes  Documentation critical care time: 10 minutes  Consulting other physicians critical care time: 10 minutes  Total critical care time (exclusive of procedural time) : 52 minutes        ED COURSE & MEDICAL DECISION MAKING    Pertinent Labs & Imaging studies reviewed. (See chart for details and specific orders.)        Summary of review of records:     Echo 5/2023:   The left ventricle is normal in size with concentric remodeling and normal systolic function. The estimated ejection fraction is 60%.  Normal right ventricular size with normal right ventricular systolic function.  Normal left ventricular diastolic function.  Intermediate central venous pressure (8 mmHg).  There is no evidence of intracardiac shunting.    Follows with cardiology for known PID.  Last nuclear med cardiac stress test in 2015 within our system.  Was nonischemic.    MDM:  Vick Gonzalez is a 67 y.o. male who presents for chest pain across the chest since about 2:00 a.m. this morning.  No relief with baby aspirin at home.  On initial evaluation vitals are stable.    Differential diagnosis includes but is not limited to:  ACS, pneumonia, pancreatitis, GERD    CBC, CMP ordered as well as lipase, troponin, EKG, BNP.          Medications   heparin 25,000 units in dextrose 5% 250 mL (100 units/mL) infusion LOW INTENSITY nomogram - [facility] (12 Units/kg/hr × 95.3 kg (Adjusted) Intravenous New Bag 8/2/23 0910)   heparin 25,000 units in dextrose 5% (100 units/ml) IV bolus from bag - ADDITIONAL PRN BOLUS - 60 units/kg (max bolus 4000 units) (has no administration in time range)   heparin 25,000 units in dextrose 5% (100 units/ml) IV bolus from bag - ADDITIONAL PRN BOLUS - 30 units/kg (max bolus 4000 units) (has no administration in time range)   aspirin chewable tablet 243 mg (243 mg Oral Given 8/2/23 4028)   HYDROcodone-acetaminophen 5-325 mg per tablet 1 tablet (1 tablet Oral Given  8/2/23 0726)   clopidogreL tablet 300 mg (300 mg Oral Given 8/2/23 0746)   heparin 25,000 units in dextrose 5% (100 units/ml) IV bolus from bag INITIAL BOLUS (max bolus 4000 units) (4,000 Units Intravenous Bolus from Bag 8/2/23 0910)   nitroGLYCERIN SL tablet 0.4 mg (0.4 mg Sublingual Given 8/2/23 0809)   HYDROmorphone injection 0.5 mg (0.5 mg Intravenous Given 8/2/23 0901)       ED Course as of 08/02/23 1025   Wed Aug 02, 2023   0700 CBC auto differential(!)  No acute abnormalities  [HL]   0706 SARS-CoV-2 RNA, Amplification, Qual: Negative [HL]   0722 Troponin I(!): 1.550 [HL]   0725 , cardiology, aware of patient. Recommending plavix, heparin gtt at this time.  [HL]   0735 Comprehensive metabolic panel(!)  Mild OSMANY noted. [HL]   0746 Patient updated on plan of care and wife over the phone was notified as well.  All questions addressed.  Comfortable with plan of hospitalization of time.  Does note continued pain across his chest.  Will trial nitroglycerin. [HL]   0812 Patient admitted to [facility] Medicine in stable condition. [HL]   0816 X-Ray Chest PA And Lateral  No acute findings. [HL]   0816 Updated by RN that his pain did not improve at all with nitroglycerin tablet. Dilaudid orderd [HL]      ED Course User Index  [HL] Carina Alba DO     Nitroglycerin drip ordered per the request of the cardiology team with plan to take him to cath lab for further evaluation.    FINAL IMPRESSION    Final diagnoses:  [R07.9] Chest pain  [I21.4] NSTEMI (non-ST elevated myocardial infarction) (Primary)       DISPOSITION  Patient admitted in stable condition             DISCLAIMER: This note was prepared with M*modal voice recognition transcription software. Garbled syntax, mangled pronouns, and other bizarre constructions may be attributed to that software system.      DO Carina Bell DO  08/02/23 1026

## 2023-08-02 NOTE — PHARMACY MED REC
"Admission Medication History     The home medication history was taken by Gris Regan CPhT.    Medication history obtained from, Patient Verified    You may go to "Admission" then "Reconcile Home Medications" tabs to review and/or act upon these items.     The home medication list has been updated by the Pharmacy department.   Please read ALL comments highlighted in yellow.   Please address this information as you see fit.    Feel free to contact us if you have any questions or require assistance.            Gris Regan CPhT.  Ext [phone]               .          "

## 2023-08-02 NOTE — Clinical Note
The catheter was inserted into the ostium   left main. An angiography was performed of the left coronary arteries. Multiple views were taken. The angiography was performed via hand injection with 40 mL of contrast.

## 2023-08-02 NOTE — PROGRESS NOTES
Future Appointments   Date Time Provider Department Center   8/21/2023  8:45 AM LAB, [facility] [place]   8/29/2023  3:15 PM [facility] XR-B LIMIT 500 LBS [facility] XRAY OP [place] [facility]   8/29/2023  4:30 PM Paige Gilbert PA-C [facility] NEUROSU [place] [facility]   9/5/2023 10:15 AM [facility] OIC-US1 MASTER [facility]OM ULTR [facility]   9/15/2023 11:15 AM Jacques Haro MD [facility] UROLOGY [place] [facility]   9/26/2023 11:00 AM Deepthi Da Silva PA-C [facility] CARDIO [place]   9/26/2023  3:15 PM [facility] XR-B LIMIT 500 LBS [facility] XRAY OP [facility]   9/26/2023  4:30 PM Paige Gilbert PA-C [facility] NEUROSU [place] [facility]   12/5/2023  2:15 PM [facility] ODC XR-A LIMIT 350 LBS [facility] XRAY OP [facility]   12/5/2023  3:40 PM Titi Christian MD [facility] NEUROSU [place] [facility]

## 2023-08-02 NOTE — HPI
Vick Gonzalez is a 67 y.o. male with CHF on Lasix PRN, Anemia, Anxiety, Cataract, Chronic pain syndrome (07/13/2011), CKD (chronic kidney disease), stage III, Diabetes mellitus type II, uncontrolled, Discitis of lumbosacral region (01/16/2015), Genital herpes, Gout, arthritis, History of alcohol abuse, History of hepatitis C, s/p successful Rx w/ SVR24 (cure) - 5/2018 (08/23/2011), History of positive PPD, treatment status unknown, History of substance abuse, Hypertension, Hypothyroidism, Liver replaced by transplant (08/23/2011), Pancreatitis (2016), Peptic ulcer disease, and Pulmonary emphysema, unspecified emphysema type (5/26/2023). Patient presented to the ED with CP. CP initially occurred on Monday at rest and was reported as severe at rest which resolved spontaneously. Patient was climbing stairs this AM when he developed CP that radiated to his neck and back with associated SOB and has been persistent since onset. At this time it is rated as moderate and was unrelieved by SL NTG and narcotics. Patient denies palpations, diaphoresis, NV. Patient denies prior cardiac history or similar pain. Initial troponin 1.5. EKG SR without acute ischemic changes. Cr 1.8 at baseline.

## 2023-08-02 NOTE — ASSESSMENT & PLAN NOTE
Cr stable at 1.8  IVF ordered for renal protection, takes Lasix PRN- will monitor volume status closely

## 2023-08-02 NOTE — PLAN OF CARE
The sw met with Cely Gonzalez(wife)[phone] who was in the pt's room b/c he was in the cath lab having a procedure. The couple live in [place] and the pt's retired. The pt's independent with his ADL's and has a straight cane and Dexcom insulin pump. The pt drove himself to the hospital but one of his family members will transport him home at d/c if he can't drive himself home. The sw completed the white board in the pt's room with her name and contact info. The sw encouraged her to call if she has any further questions or concerns. The sw will continue to follow the pt throughout his transitions of care and will assist with any d/c needs.        08/02/23 7539   Discharge Assessment   Assessment Type Discharge Planning Assessment   Confirmed/corrected address, phone number and insurance Yes   Confirmed Demographics Correct on Facesheet   Source of Information family   If unable to respond/provide information was family/caregiver contacted? Yes   Contact Name/Number Cely Gonzalez(wife)[phone]   When was your last doctors appointment?   (last week)   Communicated JOJO with patient/caregiver Date not available/Unable to determine   Reason For Admission NSTEMI   People in Home spouse   Do you expect to return to your current living situation? Yes   Do you have help at home or someone to help you manage your care at home? Yes   Who are your caregiver(s) and their phone number(s)? Cely Gonzalez(wife)[phone]   Prior to hospitilization cognitive status: Alert/Oriented   Current cognitive status: Unable to Assess  (the pt's in the cath lab)   Walking or Climbing Stairs ambulation difficulty, requires equipment;stair climbing difficulty, requires equipment;transferring difficulty, requires equipment   Home Accessibility not wheelchair accessible;stairs to enter home   Number of Stairs, Main Entrance five   Home Layout Able to live on 1st floor   Equipment Currently Used at Home cane, straight;other (see comments)  (Dexcom  insulin pump)   Readmission within 30 days? No   Patient currently being followed by outpatient case management? No   Do you currently have service(s) that help you manage your care at home? No   Do you take prescription medications? Yes   Do you have prescription coverage? Yes   Coverage PHN   Do you have any problems affording any of your prescribed medications? No  (the pt receives his meds affordably at [facility] in [place])   Is the patient taking medications as prescribed? yes   Who is going to help you get home at discharge? Cely Gonzalez(wife)[phone]   How do you get to doctors appointments? car, drives self   Are you on dialysis? No   Do you take coumadin? No   Discharge Plan A Home with family   Discharge Plan B Home Health   DME Needed Upon Discharge  none   Discharge Plan discussed with: Spouse/sig other   Name(s) and Number(s) Cely Gonzalez(wife)[phone]   Transition of Care Barriers None   Physical Activity   On average, how many days per week do you engage in moderate to strenuous exercise (like a brisk walk)? 3 days   On average, how many minutes do you engage in exercise at this level? 20 min   Housing Stability   In the last 12 months, how many places have you lived? 1   In the last 12 months, was there a time when you did not have a steady place to sleep or slept in a shelter (including now)? N   Transportation Needs   In the past 12 months, has lack of transportation kept you from medical appointments or from getting medications? no   In the past 12 months, has lack of transportation kept you from meetings, work, or from getting things needed for daily living? No   Social Connections   Are you , , , , never , or living with a partner?    Alcohol Use   Q1: How often do you have a drink containing alcohol? Never   Q2: How many drinks containing alcohol do you have on a typical day when you are drinking? None   Q3: How often do you have six or more drinks on  one occasion? Never   OTHER   Name(s) of People in Home Cely Gonzalez(wife)[phone]

## 2023-08-02 NOTE — HOSPITAL COURSE
"08/02/2023 Per HPI   08/03/2023 UDS + cocaine and opiates- bought Percocet "off the street". Nonobstructive CAD per [facility]. Cr 1.1. Entresto resumed at decreased dose. Hemodynamically stable without cardiac complaint. TTE pending.   "

## 2023-08-02 NOTE — ED NOTES
Patient reports pain 5/10 after reaching max dose at 40 mcg per Dr. Herr.Dr. Herr notified of patient pain relief per TONY Coleman.

## 2023-08-03 VITALS
WEIGHT: 248 LBS | BODY MASS INDEX: 31.83 KG/M2 | SYSTOLIC BLOOD PRESSURE: 134 MMHG | OXYGEN SATURATION: 98 % | HEART RATE: 75 BPM | HEIGHT: 74 IN | DIASTOLIC BLOOD PRESSURE: 80 MMHG | TEMPERATURE: 96 F | RESPIRATION RATE: 18 BRPM

## 2023-08-03 LAB
ALBUMIN SERPL BCP-MCNC: 3.4 G/DL (ref 3.5–5.2)
ALP SERPL-CCNC: 67 U/L (ref 55–135)
ALT SERPL W/O P-5'-P-CCNC: 16 U/L (ref 10–44)
ANION GAP SERPL CALC-SCNC: 10 MMOL/L (ref 8–16)
ASCENDING AORTA: 3.65 CM
AST SERPL-CCNC: 18 U/L (ref 10–40)
AV INDEX (PROSTH): 0.65
AV MEAN GRADIENT: 3 MMHG
AV PEAK GRADIENT: 6 MMHG
AV VALVE AREA BY VELOCITY RATIO: 4.87 CM²
AV VALVE AREA: 4.4 CM²
AV VELOCITY RATIO: 0.72
BASOPHILS # BLD AUTO: 0.02 K/UL (ref 0–0.2)
BASOPHILS NFR BLD: 0.3 % (ref 0–1.9)
BILIRUB SERPL-MCNC: 0.4 MG/DL (ref 0.1–1)
BSA FOR ECHO PROCEDURE: 2.42 M2
BUN SERPL-MCNC: 19 MG/DL (ref 8–23)
CALCIUM SERPL-MCNC: 8.7 MG/DL (ref 8.7–10.5)
CHLORIDE SERPL-SCNC: 107 MMOL/L (ref 95–110)
CHOLEST SERPL-MCNC: 143 MG/DL (ref 120–199)
CHOLEST/HDLC SERPL: 6.8 {RATIO} (ref 2–5)
CO2 SERPL-SCNC: 22 MMOL/L (ref 23–29)
CREAT SERPL-MCNC: 1.1 MG/DL (ref 0.5–1.4)
CV ECHO LV RWT: 0.62 CM
DIFFERENTIAL METHOD: ABNORMAL
DOP CALC AO PEAK VEL: 1.24 M/S
DOP CALC AO VTI: 28.5 CM
DOP CALC LVOT AREA: 6.8 CM2
DOP CALC LVOT DIAMETER: 2.94 CM
DOP CALC LVOT PEAK VEL: 0.89 M/S
DOP CALC LVOT STROKE VOLUME: 125.53 CM3
DOP CALC MV VTI: 21 CM
DOP CALCLVOT PEAK VEL VTI: 18.5 CM
E WAVE DECELERATION TIME: 215.79 MSEC
E/A RATIO: 0.9
E/E' RATIO: 9.85 M/S
ECHO LV POSTERIOR WALL: 1.24 CM (ref 0.6–1.1)
EOSINOPHIL # BLD AUTO: 0.5 K/UL (ref 0–0.5)
EOSINOPHIL NFR BLD: 6.4 % (ref 0–8)
ERYTHROCYTE [DISTWIDTH] IN BLOOD BY AUTOMATED COUNT: 13.8 % (ref 11.5–14.5)
EST. GFR  (NO RACE VARIABLE): >60 ML/MIN/1.73 M^2
FRACTIONAL SHORTENING: 33 % (ref 28–44)
GLUCOSE SERPL-MCNC: 110 MG/DL (ref 70–110)
HCT VFR BLD AUTO: 36 % (ref 40–54)
HDLC SERPL-MCNC: 21 MG/DL (ref 40–75)
HDLC SERPL: 14.7 % (ref 20–50)
HGB BLD-MCNC: 11.8 G/DL (ref 14–18)
IMM GRANULOCYTES # BLD AUTO: 0.02 K/UL (ref 0–0.04)
IMM GRANULOCYTES NFR BLD AUTO: 0.3 % (ref 0–0.5)
INTERVENTRICULAR SEPTUM: 1.28 CM (ref 0.6–1.1)
IVC DIAMETER: 1.43 CM
LA MAJOR: 5.65 CM
LA MINOR: 5.68 CM
LA WIDTH: 3.6 CM
LDLC SERPL CALC-MCNC: 75.2 MG/DL (ref 63–159)
LEFT ATRIUM SIZE: 3.48 CM
LEFT ATRIUM VOLUME INDEX MOD: 20.1 ML/M2
LEFT ATRIUM VOLUME INDEX: 25.3 ML/M2
LEFT ATRIUM VOLUME MOD: 47.87 CM3
LEFT ATRIUM VOLUME: 60.33 CM3
LEFT INTERNAL DIMENSION IN SYSTOLE: 2.71 CM (ref 2.1–4)
LEFT VENTRICLE DIASTOLIC VOLUME INDEX: 30.02 ML/M2
LEFT VENTRICLE DIASTOLIC VOLUME: 71.45 ML
LEFT VENTRICLE MASS INDEX: 76 G/M2
LEFT VENTRICLE SYSTOLIC VOLUME INDEX: 11.4 ML/M2
LEFT VENTRICLE SYSTOLIC VOLUME: 27.25 ML
LEFT VENTRICULAR INTERNAL DIMENSION IN DIASTOLE: 4.03 CM (ref 3.5–6)
LEFT VENTRICULAR MASS: 179.95 G
LV LATERAL E/E' RATIO: 9.14 M/S
LV SEPTAL E/E' RATIO: 10.67 M/S
LVOT MG: 1.7 MMHG
LVOT MV: 0.62 CM/S
LYMPHOCYTES # BLD AUTO: 2.4 K/UL (ref 1–4.8)
LYMPHOCYTES NFR BLD: 30.2 % (ref 18–48)
MAGNESIUM SERPL-MCNC: 1.8 MG/DL (ref 1.6–2.6)
MCH RBC QN AUTO: 28.7 PG (ref 27–31)
MCHC RBC AUTO-ENTMCNC: 32.8 G/DL (ref 32–36)
MCV RBC AUTO: 88 FL (ref 82–98)
MONOCYTES # BLD AUTO: 0.7 K/UL (ref 0.3–1)
MONOCYTES NFR BLD: 9.2 % (ref 4–15)
MV A" WAVE DURATION": 125.59 MSEC
MV MEAN GRADIENT: 1 MMHG
MV PEAK A VEL: 0.71 M/S
MV PEAK E VEL: 0.64 M/S
MV PEAK GRADIENT: 2 MMHG
MV STENOSIS PRESSURE HALF TIME: 62.58 MS
MV VALVE AREA BY CONTINUITY EQUATION: 5.98 CM2
MV VALVE AREA P 1/2 METHOD: 3.52 CM2
NEUTROPHILS # BLD AUTO: 4.2 K/UL (ref 1.8–7.7)
NEUTROPHILS NFR BLD: 53.6 % (ref 38–73)
NONHDLC SERPL-MCNC: 122 MG/DL
NRBC BLD-RTO: 0 /100 WBC
OHS LV EJECTION FRACTION SIMPSONS BIPLANE MOD: 5 %
PHOSPHATE SERPL-MCNC: 2.5 MG/DL (ref 2.7–4.5)
PISA MRMAX VEL: 4.67 M/S
PISA TR MAX VEL: 2.04 M/S
PLATELET # BLD AUTO: 133 K/UL (ref 150–450)
PMV BLD AUTO: 12.3 FL (ref 9.2–12.9)
POCT GLUCOSE: 175 MG/DL (ref 70–110)
POCT GLUCOSE: 98 MG/DL (ref 70–110)
POTASSIUM SERPL-SCNC: 4 MMOL/L (ref 3.5–5.1)
PROT SERPL-MCNC: 6.3 G/DL (ref 6–8.4)
PULM VEIN S/D RATIO: 2.04
PV MV: 0.53 M/S
PV PEAK D VEL: 0.25 M/S
PV PEAK GRADIENT: 2 MMHG
PV PEAK S VEL: 0.51 M/S
PV PEAK VELOCITY: 0.68 M/S
RA MAJOR: 5.52 CM
RA PRESSURE ESTIMATED: 3 MMHG
RA WIDTH: 3.6 CM
RBC # BLD AUTO: 4.11 M/UL (ref 4.6–6.2)
RV TB RVSP: 5 MMHG
RV TISSUE DOPPLER FREE WALL SYSTOLIC VELOCITY 1 (APICAL 4 CHAMBER VIEW): 9.36 CM/S
SODIUM SERPL-SCNC: 139 MMOL/L (ref 136–145)
STJ: 2.69 CM
TDI LATERAL: 0.07 M/S
TDI SEPTAL: 0.06 M/S
TDI: 0.07 M/S
TR MAX PG: 17 MMHG
TRIGL SERPL-MCNC: 234 MG/DL (ref 30–150)
TV REST PULMONARY ARTERY PRESSURE: 20 MMHG
WBC # BLD AUTO: 7.81 K/UL (ref 3.9–12.7)
Z-SCORE OF LEFT VENTRICULAR DIMENSION IN END DIASTOLE: -9.54
Z-SCORE OF LEFT VENTRICULAR DIMENSION IN END SYSTOLE: -6.64

## 2023-08-03 PROCEDURE — 83735 ASSAY OF MAGNESIUM: CPT

## 2023-08-03 PROCEDURE — 80053 COMPREHEN METABOLIC PANEL: CPT

## 2023-08-03 PROCEDURE — 25000003 PHARM REV CODE 250

## 2023-08-03 PROCEDURE — 36415 COLL VENOUS BLD VENIPUNCTURE: CPT

## 2023-08-03 PROCEDURE — 99233 PR SUBSEQUENT HOSPITAL CARE,LEVL III: ICD-10-PCS | Mod: ,,, | Performed by: NURSE PRACTITIONER

## 2023-08-03 PROCEDURE — 25000003 PHARM REV CODE 250: Performed by: NURSE PRACTITIONER

## 2023-08-03 PROCEDURE — 80061 LIPID PANEL: CPT

## 2023-08-03 PROCEDURE — 85025 COMPLETE CBC W/AUTO DIFF WBC: CPT

## 2023-08-03 PROCEDURE — 99233 SBSQ HOSP IP/OBS HIGH 50: CPT | Mod: ,,, | Performed by: NURSE PRACTITIONER

## 2023-08-03 PROCEDURE — 63600175 PHARM REV CODE 636 W HCPCS

## 2023-08-03 PROCEDURE — 84100 ASSAY OF PHOSPHORUS: CPT

## 2023-08-03 PROCEDURE — 94761 N-INVAS EAR/PLS OXIMETRY MLT: CPT

## 2023-08-03 RX ORDER — CLOPIDOGREL BISULFATE 75 MG/1
75 TABLET ORAL DAILY
Qty: 30 TABLET | Refills: 11 | Status: SHIPPED | OUTPATIENT
Start: 2023-08-03 | End: 2024-08-02

## 2023-08-03 RX ORDER — ATORVASTATIN CALCIUM 40 MG/1
40 TABLET, FILM COATED ORAL DAILY
Status: DISCONTINUED | OUTPATIENT
Start: 2023-08-03 | End: 2023-08-03 | Stop reason: HOSPADM

## 2023-08-03 RX ORDER — CLOPIDOGREL BISULFATE 75 MG/1
75 TABLET ORAL DAILY
Status: DISCONTINUED | OUTPATIENT
Start: 2023-08-03 | End: 2023-08-03 | Stop reason: HOSPADM

## 2023-08-03 RX ORDER — GABAPENTIN 300 MG/1
300 CAPSULE ORAL ONCE
Status: COMPLETED | OUTPATIENT
Start: 2023-08-03 | End: 2023-08-03

## 2023-08-03 RX ORDER — ATORVASTATIN CALCIUM 40 MG/1
40 TABLET, FILM COATED ORAL DAILY
Qty: 90 TABLET | Refills: 3 | Status: SHIPPED | OUTPATIENT
Start: 2023-08-03 | End: 2024-08-02

## 2023-08-03 RX ADMIN — ASPIRIN 81 MG CHEWABLE TABLET 81 MG: 81 TABLET CHEWABLE at 09:08

## 2023-08-03 RX ADMIN — SACUBITRIL AND VALSARTAN 1 TABLET: 24; 26 TABLET, FILM COATED ORAL at 09:08

## 2023-08-03 RX ADMIN — CLOPIDOGREL BISULFATE 75 MG: 75 TABLET ORAL at 09:08

## 2023-08-03 RX ADMIN — INSULIN ASPART 2 UNITS: 100 INJECTION, SOLUTION INTRAVENOUS; SUBCUTANEOUS at 02:08

## 2023-08-03 RX ADMIN — GABAPENTIN 300 MG: 300 CAPSULE ORAL at 03:08

## 2023-08-03 RX ADMIN — METOPROLOL SUCCINATE 200 MG: 50 TABLET, EXTENDED RELEASE ORAL at 09:08

## 2023-08-03 RX ADMIN — TACROLIMUS 2 MG: 1 CAPSULE ORAL at 09:08

## 2023-08-03 RX ADMIN — ATORVASTATIN CALCIUM 40 MG: 40 TABLET, FILM COATED ORAL at 09:08

## 2023-08-03 RX ADMIN — ISOSORBIDE DINITRATE: 20 TABLET ORAL at 09:08

## 2023-08-03 RX ADMIN — LEVOTHYROXINE SODIUM 88 MCG: 88 TABLET ORAL at 05:08

## 2023-08-03 NOTE — ASSESSMENT & PLAN NOTE
Cr  1.8 on admission, IVF given for renal protection, Cr 1.1 this AM- off of IVF without signs of ADHF

## 2023-08-03 NOTE — NURSING
Care plan reviewed with patient, AAOx4, no respiratory distress noted, on room air. NSR per cardiac monitor, no red alarm noted. Denies any pain of discomfort, education provided on medication effect and side effect, voices understanding.   Pending echo.  Call light within his reach, no apparent distress noted, bed in low position, bed alarm on, educated on the importance of calling as needed, voices understanding, stable at this time.

## 2023-08-03 NOTE — PLAN OF CARE
Problem: Adult Inpatient Plan of Care  Goal: Optimal Comfort and Wellbeing  Outcome: Ongoing, Progressing     Problem: Pain (Cardiac Catheterization)  Goal: Acceptable Pain Control  Outcome: Ongoing, Progressing     Problem: Heart Failure Comorbidity  Goal: Maintenance of Heart Failure Symptom Control  Outcome: Ongoing, Progressing

## 2023-08-03 NOTE — PROGRESS NOTES
"[facility] - Telemetry  Cardiology  Progress Note    Patient Name: Vick Gonzalez  MRN: [id number]  Admission Date: 8/2/2023  Hospital Length of Stay: 1 days  Code Status: Full Code   Attending Physician: Diego Lea MD   Primary Care Physician: Emanuel Lopez MD  Expected Discharge Date:   Principal Problem:NSTEMI (non-ST elevated myocardial infarction)    Subjective:     Hospital Course:   08/02/2023 Per HPI   08/03/2023 UDS + cocaine and opiates- bought Percocet "off the street". Nonobstructive CAD per [facility]. Cr 1.1. Entresto resumed at decreased dose. Hemodynamically stable without cardiac complaint. TTE pending.       Past Medical History:   Diagnosis Date    Acute congestive heart failure 5/12/2023    Anemia     Anxiety     Cataract     Chronic pain syndrome 07/13/2011    CKD (chronic kidney disease), stage III     Diabetes mellitus type II, uncontrolled     Discitis of lumbosacral region 01/16/2015    ED (erectile dysfunction)     Encounter for blood transfusion     Genital herpes     Gout, arthritis     History of alcohol abuse     History of hepatitis C, s/p successful Rx w/ SVR24 (cure) - 5/2018 08/23/2011    Completed 24wks Epclusa + RBV w/SVR12 - 2/2018  -     History of positive PPD, treatment status unknown     Pulmonary granulomas, negative sputum cultures for AFB and indeterminate quantferon test    History of substance abuse     Hypertension     Hypothyroidism     Liver replaced by transplant 08/23/2011    DATE: 12/16/2013  LIVER BIOPSY : REASON:  hep C staging  PATHOLOGY COMMITTEE NOTE/PLAN: :grade  1 / stage 1        Pancreatitis 2016    Peptic ulcer disease     Pulmonary emphysema, unspecified emphysema type 5/26/2023       Past Surgical History:   Procedure Laterality Date    ANTERIOR CERVICAL DISCECTOMY W/ FUSION N/A 8/22/2022    Procedure: Procedure: ACDF 4-7 LOS: 4.0 Anesthesia: General Blood: Type & Screen Radiology: C-Arm Microscope: ------- SNS: EMG, SEP, MEP " Brace: [place] Bed: Regular Bed, Shoulder Strap Headrest:------ Position: Supine Equipment: Depuy;  Surgeon: Titi Christian MD;  Location: [facility] OR;  Service: Neurosurgery;  Laterality: N/A;  Depuy  confirmed CW 8/19  Neuro monitoring confirmed CW 8/19    CARPAL TUNNEL RELEASE Left 10/29/2021    Procedure: RELEASE, CARPAL TUNNEL;  Surgeon: Jameson Alejo Jr., MD;  Location: [facility] OR;  Service: Orthopedics;  Laterality: Left;    CHOLECYSTECTOMY      CORONARY ANGIOGRAPHY N/A 8/2/2023    Procedure: ANGIOGRAM, CORONARY ARTERY;  Surgeon: Juan Vera MD;  Location: [facility] CATH LAB/EP;  Service: Cardiology;  Laterality: N/A;    DECOMPRESSION OF CERVICAL SPINE BY ANTERIOR APPROACH WITH FUSION  8/22/2022    Procedure: DECOMPRESSION AND FUSION, SPINE, CERVICAL, ANTERIOR APPROACH;  Surgeon: Titi Christian MD;  Location: [facility] OR;  Service: Neurosurgery;;    INJECTION OF JOINT Right 12/2/2019    Procedure: INJECTION, JOINT SI;  Surgeon: Thu Penn MD;  Location: [facility] PAIN MGT;  Service: Pain Management;  Laterality: Right;  RT SI JNT INJ    LEFT HEART CATHETERIZATION Left 8/2/2023    Procedure: Left heart cath;  Surgeon: Juan Vera MD;  Location: [facility] CATH LAB/EP;  Service: Cardiology;  Laterality: Left;    LIVER TRANSPLANT  06/2010    SPINE SURGERY      TRANSFORAMINAL EPIDURAL INJECTION OF STEROID Left 5/29/2023    Procedure: INJECTION, STEROID, EPIDURAL, TRANSFORAMINAL APPROACH,  LEFT C7-T1 DIRECT REF;  Surgeon: Thu Penn MD;  Location: [facility] PAIN MGT;  Service: Pain Management;  Laterality: Left;  4/3 MD ILL/PT WILL C/B       Review of patient's allergies indicates:  No Known Allergies    No current facility-administered medications on file prior to encounter.     Current Outpatient Medications on File Prior to Encounter   Medication Sig    albuterol (VENTOLIN HFA) 90 mcg/actuation inhaler Inhale 2 puffs into the lungs every 6 (six) hours as needed for Wheezing or Shortness of Breath. Rescue     allopurinoL (ZYLOPRIM) 100 MG tablet TAKE 1 TABLET BY MOUTH TWICE A DAY (Patient taking differently: Take 100 mg by mouth once daily.)    aluminum-magnesium hydroxide-simethicone (MAALOX) 200-200-20 mg/5 mL Susp Take 30 mLs by mouth 4 (four) times daily before meals and nightly. (Patient taking differently: Take 30 mLs by mouth every 6 (six) hours as needed.)    aspirin 81 MG Chew Take 1 tablet (81 mg total) by mouth once daily.    calcium carbonate-vitamin D3 (CALCIUM 600 WITH VITAMIN D3) 600 mg(1,500mg) -400 unit Chew Take 1 tablet by mouth once daily.     cyanocobalamin (VITAMIN B-12) 100 MCG tablet Take 100 mcg by mouth once daily.    diphenhydrAMINE (BENADRYL) 25 mg capsule Take 25 mg by mouth daily as needed for Allergies (Cold).    furosemide (LASIX) 20 MG tablet Take 2 tablets (40 mg total) by mouth daily as needed (For Weight Gain > 2-3 lbs in 1 day or 4-6 lbs over 1 week notify PCP and take 40 mg daily for three days).    gabapentin (NEURONTIN) 800 MG tablet Take 1 tablet (800 mg total) by mouth 3 (three) times daily.    insulin glargine U-300 conc (TOUJEO MAX U-300 SOLOSTAR) 300 unit/mL (3 mL) insulin pen Inject 40 Units into the skin once daily. (Patient taking differently: Inject 40 Units into the skin every evening.)    insulin lispro (HUMALOG KWIKPEN INSULIN) 100 unit/mL pen Inject 20 Units into the skin 3 (three) times daily with meals.    isosorbide-hydrALAZINE 20-37.5 mg (BIDIL) 20-37.5 mg Tab Take 1 tablet by mouth 3 (three) times daily.    lancets 30 gauge Misc 1 lancet by Misc.(Non-Drug; Combo Route) route 4 (four) times daily before meals and nightly.    levothyroxine (SYNTHROID) 88 MCG tablet TAKE 1 TABLET BY MOUTH EVERY DAY (Patient taking differently: Take 88 mcg by mouth before breakfast.)    metoprolol succinate (TOPROL-XL) 200 MG 24 hr tablet TAKE 1 TABLET BY MOUTH EVERY DAY (Patient taking differently: Take 200 mg by mouth once daily.)    multivit with min-folic acid (MEN'S  "MULTIVITAMIN GUMMIES) 200 mcg Chew Take 1 tablet by mouth once daily.    NOVOFINE PLUS 32 gauge x 1/6" Ndle     sacubitriL-valsartan (ENTRESTO) 49-51 mg per tablet Take 1 tablet by mouth 2 (two) times daily.    tacrolimus (PROGRAF) 1 MG Cap Take 2 capsules (2 mg total) by mouth every morning AND 1 capsule (1 mg total) every evening.    traZODone (DESYREL) 50 MG tablet TAKE 1 TABLET BY MOUTH EVERY EVENING. (Patient taking differently: Take 50 mg by mouth nightly as needed.)    [DISCONTINUED] rosuvastatin (CRESTOR) 5 MG tablet TAKE 1 TABLET BY MOUTH EVERY DAY (Patient taking differently: Take 5 mg by mouth once daily.)    blood sugar diagnostic (TRUE METRIX GLUCOSE TEST STRIP) Strp USE 3 TIMES DAILY TO TEST BLOOD GLUCOSE LEVEL    blood-glucose meter Misc 1 Device by Misc.(Non-Drug; Combo Route) route 3 (three) times daily.    blood-glucose meter,continuous (DEXCOM G6 ) Misc Use with [employer]start g6 system (transmitter,sensor)    blood-glucose sensor (DEXCOM G6 SENSOR) Viviane Use as directed    blood-glucose transmitter (DEXCOM G6 TRANSMITTER) Viviane Use as directed    [DISCONTINUED] insulin aspart U-100 (NOVOLOG FLEXPEN U-100 INSULIN) 100 unit/mL (3 mL) InPn pen Inject 20 Units into the skin 3 (three) times daily with meals.     Family History       Problem Relation (Age of Onset)    Cancer Mother, Maternal Uncle (82)    Diabetes Mother, Sister    Drug abuse Daughter    Heart disease Mother          Tobacco Use    Smoking status: Former     Current packs/day: 0.00     Passive exposure: Never    Smokeless tobacco: Never   Substance and Sexual Activity    Alcohol use: No     Comment: over 5 years ago, none currently    Drug use: Not Currently     Comment: Former cocaine use    Sexual activity: Yes     Review of Systems   Constitutional: Negative. Negative for diaphoresis.   HENT: Negative.  Negative for hearing loss.    Eyes: Negative.    Cardiovascular:  Negative for chest pain, irregular heartbeat, leg " swelling, near-syncope, orthopnea, palpitations, paroxysmal nocturnal dyspnea and syncope.   Respiratory:  Negative for cough and shortness of breath.    Endocrine: Negative.    Hematologic/Lymphatic: Negative.    Skin: Negative.    Musculoskeletal:  Positive for arthritis, back pain and myalgias.   Gastrointestinal: Negative.  Negative for nausea and vomiting.   Genitourinary: Negative.    Neurological: Negative.  Negative for dizziness, light-headedness and weakness.   Psychiatric/Behavioral: Negative.     Allergic/Immunologic: Negative.      Objective:     Vital Signs (Most Recent):  Temp: 97.4 °F (36.3 °C) (08/03/23 0742)  Pulse: 74 (08/03/23 0838)  Resp: 18 (08/03/23 0838)  BP: 127/74 (08/03/23 0742)  SpO2: 96 % (08/03/23 0838) Vital Signs (24h Range):  Temp:  [96.1 °F (35.6 °C)-97.8 °F (36.6 °C)] 97.4 °F (36.3 °C)  Pulse:  [64-82] 74  Resp:  [12-34] 18  SpO2:  [95 %-100 %] 96 %  BP: ()/(58-82) 127/74     Weight: 112.5 kg (248 lb 0.3 oz)  Body mass index is 31.84 kg/m².    SpO2: 96 %         Intake/Output Summary (Last 24 hours) at 8/3/2023 0952  Last data filed at 8/3/2023 0451  Gross per 24 hour   Intake 1431.64 ml   Output 1325 ml   Net 106.64 ml       Lines/Drains/Airways       Peripheral Intravenous Line  Duration                  Peripheral IV - Single Lumen 08/02/23 0629 20 G Anterior;Left;Proximal Forearm 1 day         Peripheral IV - Single Lumen 08/02/23 0801 20 G Right Antecubital 1 day         Peripheral IV - Single Lumen 08/02/23 1204 20 G;1 in Anterior;Right Wrist <1 day                     Physical Exam  Constitutional:       General: He is not in acute distress.     Appearance: He is not diaphoretic.   HENT:      Head: Atraumatic.   Eyes:      General:         Right eye: No discharge.         Left eye: No discharge.   Cardiovascular:      Rate and Rhythm: Normal rate and regular rhythm.   Pulmonary:      Effort: Pulmonary effort is normal.      Breath sounds: Normal breath sounds.    Abdominal:      General: Bowel sounds are normal.      Palpations: Abdomen is soft.   Musculoskeletal:      Right lower leg: No edema.      Left lower leg: No edema.   Skin:     General: Skin is warm and dry.   Neurological:      Mental Status: He is alert and oriented to person, place, and time.          Significant Labs: BMP:   Recent Labs   Lab 08/02/23  0637 08/03/23  0410   * 110    139   K 4.1 4.0    107   CO2 23 22*   BUN 24* 19   CREATININE 1.8* 1.1   CALCIUM 9.1 8.7   MG  --  1.8     , CMP   Recent Labs   Lab 08/02/23  0637 08/03/23  0410    139   K 4.1 4.0    107   CO2 23 22*   * 110   BUN 24* 19   CREATININE 1.8* 1.1   CALCIUM 9.1 8.7   PROT 7.7 6.3   ALBUMIN 4.1 3.4*   BILITOT 0.5 0.4   ALKPHOS 71 67   AST 23 18   ALT 21 16   ANIONGAP 10 10     , CBC   Recent Labs   Lab 08/02/23  0637 08/03/23  0410   WBC 10.34 7.81   HGB 12.4* 11.8*   HCT 38.5* 36.0*    133*     , INR   Recent Labs   Lab 08/02/23  0637 08/02/23  0807   INR 1.0 1.0     , Lipid Panel   Recent Labs   Lab 08/03/23  0410   CHOL 143   HDL 21*   LDLCALC 75.2   TRIG 234*   CHOLHDL 14.7*   , Troponin   Recent Labs   Lab 08/02/23  0637 08/02/23  1116 08/02/23  1809   TROPONINI 1.550* 1.715* 1.310*     , and All pertinent lab results from the last 24 hours have been reviewed.    Significant Imaging: Echocardiogram: Transthoracic echo (TTE) complete (Cupid Only):   Results for orders placed or performed during the hospital encounter of 05/12/23   Echo Saline Bubble? Yes   Result Value Ref Range    AV mean gradient 3 mmHg    Ao peak mark anthony 1.23 m/s    Ao VTI 25.62 cm    IVRT 79.92 msec    IVS 1.14 (A) 0.6 - 1.1 cm    LA size 3.50 cm    Left Atrium Major Axis 4.89 cm    Left Atrium Minor Axis 5.05 cm    LVIDd 4.05 3.5 - 6.0 cm    LVIDs 2.78 2.1 - 4.0 cm    LVOT peak VTI 18.94 cm    Posterior Wall 1.00 0.6 - 1.1 cm    MV Peak A Mark Anthony 0.69 m/s    E wave deceleration time 114.00 msec    MV Peak E Mark Anthony 0.71 m/s     "PV Peak D Mark Anthony 0.23 m/s    PV Peak S Mark Anthony 0.34 m/s    RA Major Axis 4.06 cm    RA Width 3.76 cm    RVDD 3.51 cm    TAPSE 2.77 cm    LA WIDTH 4.13 cm    MV stenosis pressure 1/2 time 33.06 ms    LV Diastolic Volume 71.95 mL    LV Systolic Volume 29.06 mL    LVOT peak mark anthony 0.88 m/s    TDI LATERAL 0.10 m/s    TDI SEPTAL 0.10 m/s    LA volume (mod) 61.95 cm3    MV "A" wave duration 15.13 msec    LV LATERAL E/E' RATIO 7.10 m/s    LV SEPTAL E/E' RATIO 7.10 m/s    FS 31 %    LA volume 61.05 cm3    LV mass 142.66 g    Left Ventricle Relative Wall Thickness 0.49 cm    AV Velocity Ratio 0.72     AV index (prosthetic) 0.74     MV valve area p 1/2 method 6.65 cm2    E/A ratio 1.03     Mean e' 0.10 m/s    Pulm vein S/D ratio 1.48     AV peak gradient 6 mmHg    E/E' ratio 7.10 m/s    LV Systolic Volume Index 12.0 mL/m2    LV Diastolic Volume Index 29.73 mL/m2    LA Volume Index 25.2 mL/m2    LV Mass Index 59 g/m2    LA Volume Index (Mod) 25.6 mL/m2    BSA 2.48 m2    Right Atrial Pressure (from IVC) 8 mmHg    EF 60 %    RV S' 13 cm/s    Narrative    · Technically difficult study  · The left ventricle is normal in size with concentric remodeling and   normal systolic function. The estimated ejection fraction is 60%.  · Normal right ventricular size with normal right ventricular systolic   function.  · Normal left ventricular diastolic function.  · Intermediate central venous pressure (8 mmHg).  · There is no evidence of intracardiac shunting.        Assessment and Plan:     Brief HPI: Patient seen this morning on rounds without cardiac complaint. [facility] discussed as well as medical management and illicit drug use.     * NSTEMI (non-ST elevated myocardial infarction)  EKG SR without acute ischemic changes  Nonobstructive CAD per [facility]- troponin elevation felt to be cocaine induced. Patient bought "percocet off the street" for back pain and believes it was altered. Encouraged to see pain management for chronic pain.  Plavix x 9 mo- OK to " hold for back surgery as no coronary stent was placed- surgery scheduled in August  Asa, BB and statin continued   TTE today- patient can be discharged prior to resulting - on GDMT    Hyperlipidemia associated with type 2 diabetes mellitus  High intensity statin     PAD (peripheral artery disease)  Stable  Continue asa, statin, ARB    CKD (chronic kidney disease), stage III  Cr  1.8 on admission, IVF given for renal protection, Cr 1.1 this AM- off of IVF without signs of ADHF      Essential hypertension  SBP 110s-140s  Continue BB, Entresto, Bidil    Substance abuse  Patient admits to buying pain meds off the street, reports he has not purchased cocaine in years  Encouraged patient to see PMR for chronic pain needs/ opiate alternatives   UDS + cocaine and opiates   Cessation advised     HFrEF  Stable  No ADHF on exam  Continue BB, Entresto, Lasix (PRN)    VTE Risk Mitigation (From admission, onward)         Ordered     Reason for No Pharmacological VTE Prophylaxis  Once        Question:  Reasons:  Answer:  IV Heparin w/in 24 hrs. Pre or Post-Op    08/02/23 0835     IP VTE HIGH RISK PATIENT  Once         08/02/23 0835     Place sequential compression device  Until discontinued         08/02/23 0835                [place]ail [place] NP  Cardiology  [facility] - Telemetry

## 2023-08-03 NOTE — PROGRESS NOTES
08/02/23 2226   Admission   Initial VN Admission Questions Complete   Communication Issues? Technical Issue  (no vidyo monitor)   Safety/Activity   Safety Promotion/Fall Prevention Fall Risk reviewed with patient/family     Pt arrived from ICU. Admission incomplete. VN called pt on room telephone and admission questions completed with pt's permission. Plan of care reviewed with pt. Pt denies any needs at this time. Instructed to call for needs/assist oob.

## 2023-08-03 NOTE — ASSESSMENT & PLAN NOTE
"EKG SR without acute ischemic changes  Nonobstructive CAD per [facility]- troponin elevation felt to be cocaine induced. Patient bought "percocet off the street" for back pain and believes it was altered. Encouraged to see pain management for chronic pain.  Plavix x 9 mo- OK to hold for back surgery as no coronary stent was placed- surgery scheduled in August  Asa, BB and statin continued   TTE today- patient can be discharged prior to resulting - on GDMT  "

## 2023-08-03 NOTE — DISCHARGE SUMMARY
[facility] Medicine Discharge Summary    Primary Team: [facility] Hospitalist Team B  Attending Physician: Diego Lea MD  Resident: Freddy  Intern: Anastacia    Date of Admit: 8/2/2023  Date of Discharge: 8/3/2023    Discharge to: Home  Condition: Stable/Improved    Discharge Diagnoses     NSTEMI  Acute kidney injury on CKD3  HTN  HLD  T2Dm on insulin  PAD    Consultants and Procedures     Consultants:  [facility]    Procedures:   8/2 LHC: no intervention    Imaging:  CXR (8/2/23)  FINDINGS:  The osseous structures demonstrate incompletely imaged surgical changes within the cervical spine. Heart size is not enlarged. There is no significant pleural effusion. Lungs are clear.    Brief History of Present Illness      Vick Gonzalez is  68 yo male with a PMHx of CHF, anemia, CKD III, uncontrolled T2DM, Gout, Hep C s/p treatment, liver transplant (2011), BPH, and pulmonary emphysema who presented to [facility] [place] on 8/2/2023 for stabbing, substernal, non-radiating chest pain that began overnight. He was found to have an NSTEMI with a troponin of 1.55 and with plans for Cath lab for procedure. Admitted to ICU for nitro drip and heparin gtt. LHC performed with non-obstructive CAD and no stents placed. Plan to continue ASA for life and plavix for at least 1 year with high intensity statin. Patient tolerated cath well, was stepped down from ICU 8/2 and with no returning symptoms.    For the full HPI please refer to the History & Physical from this admission.    Day of Discharge Physical Exam:  Constitutional: Alert and oriented, no acute distress, sitting up in bed   HENT: Normocephalic and atraumatic. Pupils are equal, round, and reactive to light. Conjunctivae are normal bilaterally.   Cardiovascular: Normal rate, regular rhythm, normal heart sounds and normal pulses. No murmur heard.  Pulmonary: Pulmonary effort is normal. Normal breath sounds in bilateral lung fields. On room air   Abdominal: Soft, non-tender. Bowel sounds  are normal.   Musculoskeletal: No swelling. Normal range of motion.   Neurological: He is alert, orientedx3, smile symmetric, moving all extremities freely.   Skin: Skin is warm and dry. R radial access site clean, dry, no overlying hematoma or bruising    Hospital Course By Problem with Pertinent Findings     Chest Pain, NSTEMI   - Presented with stabbing, non-radiating substernal chest pain x 6 hours  - In the ED, troponin was 1.55 and EKG showed sign of no ischemic process  - Was aspirin and plavix loaded and heparin gtt, nitroglycerin drip started due to sublingual nitroglycerin not relieving chest pain  - Cardiology consulted and heart cath performed 8/2 with non-obstructive CAD, so no stent was placed  - Patient to be placed in ICU for monitoring and nitroglycerin drip   - Troponin 1.55 - 1.715 - 1.310  - Echocardiogram ordered to assess heart function  - UDS + for cocaine and opiates     Acute Kidney Injury on Chronic Kidney Disease Stage III, resolved  - BUN/Cr: 24/1.8 on admission, baseline Cr around 1.3  - Last measured GFR - 41  - held entresto and allopurinol in setting of OSMANY  - Cr day of discharge 1.1    Type 2 Diabetes with long term insulin use  - Home regimen glargine 40 units at night and lispro 20 untis with meals  - Last A1C was 6.8 on 5/13/23  - SSI for glucose control while in the hospital     Hyperlipidemia associated with Type 2 Diabetes  - Home rosuvastatin 5 mg daily  - Atorvastatin 20 mg daily while inpatient as alternative  - will increase to high intensity statin on discharge for tighter LDL control     Hypertension associated with Type 2 Diabetes  - Continued home regimen of Metoprolol Succinate 200 mg daily, Hydralazine 35 mg, Isosorbide dinitrate 20 mg 3 times daily     Acquired Hypothyroidism  - Continued home Levothyroxine 88 mcg, daily, before breakfast     PAD  - Per chart review, has history of PAD  - Once cardiac intervention complete, will continue ASA and Plavix  - per cards,  patient okay to hold plavix prior to planned NSGY procedure     Gout   - Home regimen is Allopurinol; held in setting of OSMANY     History of Liver transplant; 2011  - Tacrolimus 1 mg every evening  - Tacrolimus 2 mg every morning  - Likely in setting of Hep C s/p treatment with Epclusa - unclear in chart review    Discharge Medications        Medication List        START taking these medications      atorvastatin 40 MG tablet  Commonly known as: LIPITOR  Take 1 tablet (40 mg total) by mouth once daily.  Replaces: rosuvastatin 5 MG tablet     clopidogreL 75 mg tablet  Commonly known as: PLAVIX  Take 1 tablet (75 mg total) by mouth once daily.            CHANGE how you take these medications      allopurinoL 100 MG tablet  Commonly known as: ZYLOPRIM  TAKE 1 TABLET BY MOUTH TWICE A DAY  What changed: when to take this     levothyroxine 88 MCG tablet  Commonly known as: SYNTHROID  TAKE 1 TABLET BY MOUTH EVERY DAY  What changed: when to take this     metoprolol succinate 200 MG 24 hr tablet  Commonly known as: TOPROL-XL  TAKE 1 TABLET BY MOUTH EVERY DAY  What changed: when to take this     TOUJEO MAX U-300 SOLOSTAR 300 unit/mL (3 mL) insulin pen  Generic drug: insulin glargine U-300 conc  Inject 40 Units into the skin once daily.  What changed: when to take this     traZODone 50 MG tablet  Commonly known as: DESYREL  TAKE 1 TABLET BY MOUTH EVERY EVENING.  What changed:   when to take this  reasons to take this            CONTINUE taking these medications      albuterol 90 mcg/actuation inhaler  Commonly known as: VENTOLIN HFA  Inhale 2 puffs into the lungs every 6 (six) hours as needed for Wheezing or Shortness of Breath. Rescue     aluminum-magnesium hydroxide-simethicone 200-200-20 mg/5 mL Susp  Commonly known as: MAALOX  Take 30 mLs by mouth 4 (four) times daily before meals and nightly.     aspirin 81 MG Chew  Take 1 tablet (81 mg total) by mouth once daily.     blood sugar diagnostic Strp  Commonly known as: TRUE  "METRIX GLUCOSE TEST STRIP  USE 3 TIMES DAILY TO TEST BLOOD GLUCOSE LEVEL     blood-glucose meter Misc  1 Device by Misc.(Non-Drug; Combo Route) route 3 (three) times daily.     calcium carbonate-vitamin D3 600 mg-10 mcg (400 unit) Chew  Commonly known as: CALCIUM 600 WITH VITAMIN D3     cyanocobalamin 100 MCG tablet  Commonly known as: VITAMIN B-12     DEXCOM G6  Misc  Generic drug: blood-glucose meter,continuous  Use with dexKickanotch mobile g6 system (transmitter,sensor)     DEXCOM G6 SENSOR Viviane  Generic drug: blood-glucose sensor  Use as directed     DEXCOM G6 TRANSMITTER Viviane  Generic drug: blood-glucose transmitter  Use as directed     diphenhydrAMINE 25 mg capsule  Commonly known as: BENADRYL     ENTRESTO 49-51 mg per tablet  Generic drug: sacubitriL-valsartan  Take 1 tablet by mouth 2 (two) times daily.     furosemide 20 MG tablet  Commonly known as: LASIX  Take 2 tablets (40 mg total) by mouth daily as needed (For Weight Gain > 2-3 lbs in 1 day or 4-6 lbs over 1 week notify PCP and take 40 mg daily for three days).     gabapentin 800 MG tablet  Commonly known as: NEURONTIN  Take 1 tablet (800 mg total) by mouth 3 (three) times daily.     insulin lispro 100 unit/mL pen  Commonly known as: HumaLOG KwikPen Insulin  Inject 20 Units into the skin 3 (three) times daily with meals.     isosorbide-hydrALAZINE 20-37.5 mg 20-37.5 mg Tab  Commonly known as: BIDIL  Take 1 tablet by mouth 3 (three) times daily.     lancets 30 gauge Misc  1 lancet by Misc.(Non-Drug; Combo Route) route 4 (four) times daily before meals and nightly.     MEN'S MULTIVITAMIN GUMMIES 200 mcg Chew  Generic drug: multivit with min-folic acid     NOVOFINE PLUS 32 gauge x 1/6" Ndle  Generic drug: pen needle, diabetic     tacrolimus 1 MG Cap  Commonly known as: PROGRAF  Take 2 capsules (2 mg total) by mouth every morning AND 1 capsule (1 mg total) every evening.            STOP taking these medications      rosuvastatin 5 MG tablet  Commonly known as: " CRESTOR  Replaced by: atorvastatin 40 MG tablet               Where to Get Your Medications        These medications were sent to [facility]  [address], [place] [address]      Hours: Mon-Fri, 8a-5:30p Phone: [phone]   atorvastatin 40 MG tablet  clopidogreL 75 mg tablet         Discharge Information:   Diet:  Cardiac    Physical Activity:  As tolerated             Instructions:  1. Take all medications as prescribed  2. Keep all follow-up appointments  3. Return to the hospital or call your primary care physicians if any worsening symptoms such as fever, chest pain, shortness of breath, return of symptoms, worsening swelling or bleeding from radial cath access site, or any other concerns.    Follow-Up Appointments:  Cardiology 9/26  Follow up with PCP order placed on discharge    Ca Hayes MD  [facility] Internal Medicine, -

## 2023-08-03 NOTE — MEDICAL/APP STUDENT
Subjective:  67 y.o. male admitted for chest pain. He denies any chest pain or shortness this morning. He is feeling back to his baseline of health.    Objective:  Vitals:    08/03/23 0742   BP: 127/74   Pulse: 75   Resp: 18   Temp: 97.4 °F (36.3 °C)      Recent Labs   Lab Result Units 08/02/23  1809   Troponin I ng/mL 1.310*     Physical Exam:  - General: patient sitting on side of bed  - Cardiovascular: normal rate, regular rhythm  - Pulmonary: normal breath sounds  -Musculoskeletal: no edema    Assessment:  Vick Gonzalez is 66 yo male admitted for NSTEMI and brought to cath lab, nothing was done. UDS positive for cocaine. Patient is back to baseline. Was stepped down from ICU to the floor.    Plan:  Chest Pain, NSTEMI  -no intervention performed in cath lab    Elevated Troponin  - Troponin 1.31 from 1.55 in ED  - Trending down      Renée Puente- 3rd year med student

## 2023-08-03 NOTE — PROGRESS NOTES
Future Appointments   Date Time Provider Department Center   8/7/2023  1:30 PM Roberto Jolley DNP [facility] PREADMT [facility]   8/21/2023  8:45 AM LAB, [place] [facility] [place]   8/24/2023  2:00 PM Taryn Corcoran MD [facility] IMPRI [facility]   8/29/2023  3:15 PM [facility] ODC XR-B LIMIT 500 LBS [facility] XRAY OP [facility]   8/29/2023  4:30 PM Paige Gilbert PA-C [facility] NEUROSU [facility]   9/5/2023 10:15 AM [facility] OIC-US1 MASTER [facility] ULTR IC [facility]   9/15/2023 11:15 AM Jacques Haro MD [facility] UROLOGY [facility]   9/26/2023 11:00 AM Deepthi Da Silva PA-C [facility] CARDIO [place]   9/26/2023  3:15 PM [facility] ODC XR-B LIMIT 500 LBS [facility] XRAY OP [facility]   9/26/2023  4:30 PM Paige Gilbert PA-C [facility] NEUROSU [facility]   12/5/2023  2:15 PM [facility] ODC XR-A LIMIT 350 LBS [facility] XRAY OP [facility]   12/5/2023  3:40 PM Titi Christian MD [facility] NEUROSU [facility]

## 2023-08-03 NOTE — ASSESSMENT & PLAN NOTE
Patient admits to buying pain meds off the street, reports he has not purchased cocaine in years  Encouraged patient to see PMR for chronic pain needs/ opiate alternatives   UDS + cocaine and opiates   Cessation advised

## 2023-08-03 NOTE — SUBJECTIVE & OBJECTIVE
Past Medical History:   Diagnosis Date    Acute congestive heart failure 5/12/2023    Anemia     Anxiety     Cataract     Chronic pain syndrome 07/13/2011    CKD (chronic kidney disease), stage III     Diabetes mellitus type II, uncontrolled     Discitis of lumbosacral region 01/16/2015    ED (erectile dysfunction)     Encounter for blood transfusion     Genital herpes     Gout, arthritis     History of alcohol abuse     History of hepatitis C, s/p successful Rx w/ SVR24 (cure) - 5/2018 08/23/2011    Completed 24wks Epclusa + RBV w/SVR12 - 2/2018  -     History of positive PPD, treatment status unknown     Pulmonary granulomas, negative sputum cultures for AFB and indeterminate quantferon test    History of substance abuse     Hypertension     Hypothyroidism     Liver replaced by transplant 08/23/2011    DATE: 12/16/2013  LIVER BIOPSY : REASON:  hep C staging  PATHOLOGY COMMITTEE NOTE/PLAN: :grade  1 / stage 1        Pancreatitis 2016    Peptic ulcer disease     Pulmonary emphysema, unspecified emphysema type 5/26/2023       Past Surgical History:   Procedure Laterality Date    ANTERIOR CERVICAL DISCECTOMY W/ FUSION N/A 8/22/2022    Procedure: Procedure: ACDF 4-7 LOS: 4.0 Anesthesia: General Blood: Type & Screen Radiology: C-Arm Microscope: ------- SNS: EMG, SEP, MEP Brace: Lykens Bed: Regular Bed, Shoulder Strap Headrest:------ Position: Supine Equipment: Depuy;  Surgeon: Titi Christian MD;  Location: [facility] OR;  Service: Neurosurgery;  Laterality: N/A;  Depuy  confirmed CW 8/19  Neuro monitoring confirmed CW 8/19    CARPAL TUNNEL RELEASE Left 10/29/2021    Procedure: RELEASE, CARPAL TUNNEL;  Surgeon: Jameson Alejo Jr., MD;  Location: [facility] OR;  Service: Orthopedics;  Laterality: Left;    CHOLECYSTECTOMY      CORONARY ANGIOGRAPHY N/A 8/2/2023    Procedure: ANGIOGRAM, CORONARY ARTERY;  Surgeon: Juan Vera MD;  Location: [facility] CATH LAB/EP;  Service: Cardiology;  Laterality: N/A;    DECOMPRESSION OF CERVICAL  SPINE BY ANTERIOR APPROACH WITH FUSION  8/22/2022    Procedure: DECOMPRESSION AND FUSION, SPINE, CERVICAL, ANTERIOR APPROACH;  Surgeon: Titi Christian MD;  Location: [facility] OR;  Service: Neurosurgery;;    INJECTION OF JOINT Right 12/2/2019    Procedure: INJECTION, JOINT SI;  Surgeon: Thu Penn MD;  Location: [facility] PAIN MGT;  Service: Pain Management;  Laterality: Right;  RT SI JNT INJ    LEFT HEART CATHETERIZATION Left 8/2/2023    Procedure: Left heart cath;  Surgeon: Juan Vera MD;  Location: [facility] CATH LAB/EP;  Service: Cardiology;  Laterality: Left;    LIVER TRANSPLANT  06/2010    SPINE SURGERY      TRANSFORAMINAL EPIDURAL INJECTION OF STEROID Left 5/29/2023    Procedure: INJECTION, STEROID, EPIDURAL, TRANSFORAMINAL APPROACH,  LEFT C7-T1 DIRECT REF;  Surgeon: Thu Penn MD;  Location: [facility] PAIN MGT;  Service: Pain Management;  Laterality: Left;  4/3 MD ILL/PT WILL C/B       Review of patient's allergies indicates:  No Known Allergies    No current facility-administered medications on file prior to encounter.     Current Outpatient Medications on File Prior to Encounter   Medication Sig    albuterol (VENTOLIN HFA) 90 mcg/actuation inhaler Inhale 2 puffs into the lungs every 6 (six) hours as needed for Wheezing or Shortness of Breath. Rescue    allopurinoL (ZYLOPRIM) 100 MG tablet TAKE 1 TABLET BY MOUTH TWICE A DAY (Patient taking differently: Take 100 mg by mouth once daily.)    aluminum-magnesium hydroxide-simethicone (MAALOX) 200-200-20 mg/5 mL Susp Take 30 mLs by mouth 4 (four) times daily before meals and nightly. (Patient taking differently: Take 30 mLs by mouth every 6 (six) hours as needed.)    aspirin 81 MG Chew Take 1 tablet (81 mg total) by mouth once daily.    calcium carbonate-vitamin D3 (CALCIUM 600 WITH VITAMIN D3) 600 mg(1,500mg) -400 unit Chew Take 1 tablet by mouth once daily.     cyanocobalamin (VITAMIN B-12) 100 MCG tablet Take 100 mcg by mouth once daily.    diphenhydrAMINE (BENADRYL)  "25 mg capsule Take 25 mg by mouth daily as needed for Allergies (Cold).    furosemide (LASIX) 20 MG tablet Take 2 tablets (40 mg total) by mouth daily as needed (For Weight Gain > 2-3 lbs in 1 day or 4-6 lbs over 1 week notify PCP and take 40 mg daily for three days).    gabapentin (NEURONTIN) 800 MG tablet Take 1 tablet (800 mg total) by mouth 3 (three) times daily.    insulin glargine U-300 conc (TOUJEO MAX U-300 SOLOSTAR) 300 unit/mL (3 mL) insulin pen Inject 40 Units into the skin once daily. (Patient taking differently: Inject 40 Units into the skin every evening.)    insulin lispro (HUMALOG KWIKPEN INSULIN) 100 unit/mL pen Inject 20 Units into the skin 3 (three) times daily with meals.    isosorbide-hydrALAZINE 20-37.5 mg (BIDIL) 20-37.5 mg Tab Take 1 tablet by mouth 3 (three) times daily.    lancets 30 gauge Misc 1 lancet by Misc.(Non-Drug; Combo Route) route 4 (four) times daily before meals and nightly.    levothyroxine (SYNTHROID) 88 MCG tablet TAKE 1 TABLET BY MOUTH EVERY DAY (Patient taking differently: Take 88 mcg by mouth before breakfast.)    metoprolol succinate (TOPROL-XL) 200 MG 24 hr tablet TAKE 1 TABLET BY MOUTH EVERY DAY (Patient taking differently: Take 200 mg by mouth once daily.)    multivit with min-folic acid (MEN'S MULTIVITAMIN GUMMIES) 200 mcg Chew Take 1 tablet by mouth once daily.    NOVOFINE PLUS 32 gauge x 1/6" Ndle     sacubitriL-valsartan (ENTRESTO) 49-51 mg per tablet Take 1 tablet by mouth 2 (two) times daily.    tacrolimus (PROGRAF) 1 MG Cap Take 2 capsules (2 mg total) by mouth every morning AND 1 capsule (1 mg total) every evening.    traZODone (DESYREL) 50 MG tablet TAKE 1 TABLET BY MOUTH EVERY EVENING. (Patient taking differently: Take 50 mg by mouth nightly as needed.)    [DISCONTINUED] rosuvastatin (CRESTOR) 5 MG tablet TAKE 1 TABLET BY MOUTH EVERY DAY (Patient taking differently: Take 5 mg by mouth once daily.)    blood sugar diagnostic (TRUE METRIX GLUCOSE TEST STRIP) " Strp USE 3 TIMES DAILY TO TEST BLOOD GLUCOSE LEVEL    blood-glucose meter Misc 1 Device by Misc.(Non-Drug; Combo Route) route 3 (three) times daily.    blood-glucose meter,continuous (DEXCOM G6 ) Misc Use with StayClassy g6 system (transmitter,sensor)    blood-glucose sensor (DEXCOM G6 SENSOR) Viviane Use as directed    blood-glucose transmitter (DEXCOM G6 TRANSMITTER) Viviane Use as directed    [DISCONTINUED] insulin aspart U-100 (NOVOLOG FLEXPEN U-100 INSULIN) 100 unit/mL (3 mL) InPn pen Inject 20 Units into the skin 3 (three) times daily with meals.     Family History       Problem Relation (Age of Onset)    Cancer Mother, Maternal Uncle (82)    Diabetes Mother, Sister    Drug abuse Daughter    Heart disease Mother          Tobacco Use    Smoking status: Former     Current packs/day: 0.00     Passive exposure: Never    Smokeless tobacco: Never   Substance and Sexual Activity    Alcohol use: No     Comment: over 5 years ago, none currently    Drug use: Not Currently     Comment: Former cocaine use    Sexual activity: Yes     Review of Systems   Constitutional: Negative. Negative for diaphoresis.   HENT: Negative.  Negative for hearing loss.    Eyes: Negative.    Cardiovascular:  Negative for chest pain, irregular heartbeat, leg swelling, near-syncope, orthopnea, palpitations, paroxysmal nocturnal dyspnea and syncope.   Respiratory:  Negative for cough and shortness of breath.    Endocrine: Negative.    Hematologic/Lymphatic: Negative.    Skin: Negative.    Musculoskeletal:  Positive for arthritis, back pain and myalgias.   Gastrointestinal: Negative.  Negative for nausea and vomiting.   Genitourinary: Negative.    Neurological: Negative.  Negative for dizziness, light-headedness and weakness.   Psychiatric/Behavioral: Negative.     Allergic/Immunologic: Negative.      Objective:     Vital Signs (Most Recent):  Temp: 97.4 °F (36.3 °C) (08/03/23 0742)  Pulse: 74 (08/03/23 0838)  Resp: 18 (08/03/23 0838)  BP: 127/74  (08/03/23 0742)  SpO2: 96 % (08/03/23 0838) Vital Signs (24h Range):  Temp:  [96.1 °F (35.6 °C)-97.8 °F (36.6 °C)] 97.4 °F (36.3 °C)  Pulse:  [64-82] 74  Resp:  [12-34] 18  SpO2:  [95 %-100 %] 96 %  BP: ()/(58-82) 127/74     Weight: 112.5 kg (248 lb 0.3 oz)  Body mass index is 31.84 kg/m².    SpO2: 96 %         Intake/Output Summary (Last 24 hours) at 8/3/2023 0952  Last data filed at 8/3/2023 0451  Gross per 24 hour   Intake 1431.64 ml   Output 1325 ml   Net 106.64 ml       Lines/Drains/Airways       Peripheral Intravenous Line  Duration                  Peripheral IV - Single Lumen 08/02/23 0629 20 G Anterior;Left;Proximal Forearm 1 day         Peripheral IV - Single Lumen 08/02/23 0801 20 G Right Antecubital 1 day         Peripheral IV - Single Lumen 08/02/23 1204 20 G;1 in Anterior;Right Wrist <1 day                     Physical Exam  Constitutional:       General: He is not in acute distress.     Appearance: He is not diaphoretic.   HENT:      Head: Atraumatic.   Eyes:      General:         Right eye: No discharge.         Left eye: No discharge.   Cardiovascular:      Rate and Rhythm: Normal rate and regular rhythm.   Pulmonary:      Effort: Pulmonary effort is normal.      Breath sounds: Normal breath sounds.   Abdominal:      General: Bowel sounds are normal.      Palpations: Abdomen is soft.   Musculoskeletal:      Right lower leg: No edema.      Left lower leg: No edema.   Skin:     General: Skin is warm and dry.   Neurological:      Mental Status: He is alert and oriented to person, place, and time.          Significant Labs: BMP:   Recent Labs   Lab 08/02/23  0637 08/03/23  0410   * 110    139   K 4.1 4.0    107   CO2 23 22*   BUN 24* 19   CREATININE 1.8* 1.1   CALCIUM 9.1 8.7   MG  --  1.8     , CMP   Recent Labs   Lab 08/02/23  0637 08/03/23  0410    139   K 4.1 4.0    107   CO2 23 22*   * 110   BUN 24* 19   CREATININE 1.8* 1.1   CALCIUM 9.1 8.7   PROT 7.7  "6.3   ALBUMIN 4.1 3.4*   BILITOT 0.5 0.4   ALKPHOS 71 67   AST 23 18   ALT 21 16   ANIONGAP 10 10     , CBC   Recent Labs   Lab 08/02/23  0637 08/03/23  0410   WBC 10.34 7.81   HGB 12.4* 11.8*   HCT 38.5* 36.0*    133*     , INR   Recent Labs   Lab 08/02/23  0637 08/02/23  0807   INR 1.0 1.0     , Lipid Panel   Recent Labs   Lab 08/03/23  0410   CHOL 143   HDL 21*   LDLCALC 75.2   TRIG 234*   CHOLHDL 14.7*   , Troponin   Recent Labs   Lab 08/02/23  0637 08/02/23  1116 08/02/23  1809   TROPONINI 1.550* 1.715* 1.310*     , and All pertinent lab results from the last 24 hours have been reviewed.    Significant Imaging: Echocardiogram: Transthoracic echo (TTE) complete (Cupid Only):   Results for orders placed or performed during the hospital encounter of 05/12/23   Echo Saline Bubble? Yes   Result Value Ref Range    AV mean gradient 3 mmHg    Ao peak mark anthony 1.23 m/s    Ao VTI 25.62 cm    IVRT 79.92 msec    IVS 1.14 (A) 0.6 - 1.1 cm    LA size 3.50 cm    Left Atrium Major Axis 4.89 cm    Left Atrium Minor Axis 5.05 cm    LVIDd 4.05 3.5 - 6.0 cm    LVIDs 2.78 2.1 - 4.0 cm    LVOT peak VTI 18.94 cm    Posterior Wall 1.00 0.6 - 1.1 cm    MV Peak A Mark Anthony 0.69 m/s    E wave deceleration time 114.00 msec    MV Peak E Mark Anthony 0.71 m/s    PV Peak D Mark Anthony 0.23 m/s    PV Peak S Mark [place] 0.34 m/s    RA Major Axis 4.06 cm    RA Width 3.76 cm    RVDD 3.51 cm    TAPSE 2.77 cm    LA WIDTH 4.13 cm    MV stenosis pressure 1/2 time 33.06 ms    LV Diastolic Volume 71.95 mL    LV Systolic Volume 29.06 mL    LVOT peak mark anthony 0.88 m/s    TDI LATERAL 0.10 m/s    TDI SEPTAL 0.10 m/s    LA volume (mod) 61.95 cm3    MV "A" wave duration 15.13 msec    LV LATERAL E/E' RATIO 7.10 m/s    LV SEPTAL E/E' RATIO 7.10 m/s    FS 31 %    LA volume 61.05 cm3    LV mass 142.66 g    Left Ventricle Relative Wall Thickness 0.49 cm    AV Velocity Ratio 0.72     AV index (prosthetic) 0.74     MV valve area p 1/2 method 6.65 cm2    E/A ratio 1.03     Mean e' 0.10 m/s    Pulm " vein S/D ratio 1.48     AV peak gradient 6 mmHg    E/E' ratio 7.10 m/s    LV Systolic Volume Index 12.0 mL/m2    LV Diastolic Volume Index 29.73 mL/m2    LA Volume Index 25.2 mL/m2    LV Mass Index 59 g/m2    LA Volume Index (Mod) 25.6 mL/m2    BSA 2.48 m2    Right Atrial Pressure (from IVC) 8 mmHg    EF 60 %    RV S' 13 cm/s    Narrative    · Technically difficult study  · The left ventricle is normal in size with concentric remodeling and   normal systolic function. The estimated ejection fraction is 60%.  · Normal right ventricular size with normal right ventricular systolic   function.  · Normal left ventricular diastolic function.  · Intermediate central venous pressure (8 mmHg).  · There is no evidence of intracardiac shunting.

## 2023-08-03 NOTE — NURSING
Patent discharged home on stable conditions with all belongings via wheelchair, accompanied by hospital transporter. Peripheral IV's discontinued, tip intact, no bleeding present. All belongings with patient, discharged on stable conditions, denies any pain or discomfort at time of discharge. Telemetry monitor discontinued and returned to telemetry monitor tech.

## 2023-08-03 NOTE — PLAN OF CARE
CM spoke with pt. prior to discharge, pt. stated he's driving himself home, MD aware  Discharging nurse to review Meds/Instructions  F/u appt in place, pt. will be contacted for cardiology  No HH/DME ordered.    Future Appointments   Date Time Provider Department Center   8/7/2023  1:30 PM Roberto Jolley DNP [facility] PREADMT [facility]   8/21/2023  8:45 AM LAB, [facility] LAB [place]   8/24/2023  2:00 PM Taryn Corcoran MD [facility] IMPRI [place] [facility]   8/29/2023  3:15 PM [facility] ODC XR-B LIMIT 500 LBS [facility] XRAY OP [facility]   8/29/2023  4:30 PM Paige Gilbert PA-C [facility] NEUROSU [facility]   9/5/2023 10:15 AM NOM OIC-US1 MASTER NOM ULTR [facility]   9/15/2023 11:15 AM Jacques Haro MD [facility] UROLOGY [facility]   9/26/2023 11:00 AM Deepthi Da Silva PA-C [facility] CARDIO [place]   9/26/2023  3:15 PM [facility] ODC XR-B LIMIT 500 LBS [facility] XRAY OP [facility]   9/26/2023  4:30 PM Paige Gilbert PA-C [facility] NEUROSU [facility]   12/5/2023  2:15 PM [facility] ODC XR-A LIMIT 350 LBS [facility] XRAY OP [facility]   12/5/2023  3:40 PM Titi Christian MD [facility] NEUROSU [facility]        08/03/23 1533   Final Note   Assessment Type Final Discharge Note   Anticipated Discharge Disposition Home   What phone number can be called within the next 1-3 days to see how you are doing after discharge? [phone]   Hospital Resources/Appts/Education Provided Appointments scheduled and added to [facility]   Hospital Follow Up  Appt(s) scheduled? Yes   Any social issues identified prior to discharge? No   Post-Acute Status   Discharge Delays None known at this time

## 2023-08-04 ENCOUNTER — TELEPHONE (OUTPATIENT)
Dept: FAMILY MEDICINE | Facility: CLINIC | Age: 68
End: 2023-08-04
Payer: MEDICARE

## 2023-08-04 ENCOUNTER — PATIENT OUTREACH (OUTPATIENT)
Dept: ADMINISTRATIVE | Facility: CLINIC | Age: 68
End: 2023-08-04
Payer: MEDICARE

## 2023-08-04 RX ORDER — TRAZODONE HYDROCHLORIDE 50 MG/1
50 TABLET ORAL NIGHTLY
Qty: 90 TABLET | Refills: 3 | Status: SHIPPED | OUTPATIENT
Start: 2023-08-04

## 2023-08-04 RX ORDER — ALBUTEROL SULFATE 90 UG/1
2 AEROSOL, METERED RESPIRATORY (INHALATION) EVERY 6 HOURS PRN
Qty: 18 G | Refills: 5 | Status: SHIPPED | OUTPATIENT
Start: 2023-08-04

## 2023-08-04 RX ORDER — LEVOTHYROXINE SODIUM 88 UG/1
TABLET ORAL
Qty: 90 TABLET | Refills: 1 | Status: SHIPPED | OUTPATIENT
Start: 2023-08-04

## 2023-08-04 NOTE — TELEPHONE ENCOUNTER
Saw that he was recently just admitted to the hospital with non STEMI on 08/02/2023.  No stents needed.  May need to follow up with Cardiology prior to any clearance for neuro surgery given this recent development

## 2023-08-04 NOTE — TELEPHONE ENCOUNTER
----- Message from Stacey Otoloe sent at 8/4/2023 12:30 PM CDT -----  Type:  Pharmacy Calling to Clarify an RX      Pharmacy Name:[facility] pharmacy  Prescription Name:albuterol (VENTOLIN HFA) 90 mcg/actuation inhaler   What do they need to clarify?:alternative   Best Call Back Number: fax# [phone]  Additional Information: albuterol (VENTOLIN HFA) 90 mcg/actuation inhaler is not covered by insurance would like to know if it can changed

## 2023-08-04 NOTE — TELEPHONE ENCOUNTER
Patient's insurance does not cover albuterol. Pharmacy would like to change it to a covered alternate. Please advise.

## 2023-08-04 NOTE — PROGRESS NOTES
C3 nurse spoke with Vick Gonzalez  for a TCC post hospital discharge follow up call. The patient has a scheduled HOSFU appointment with Taryn Corcoran on 8/24/23 @ 1400.

## 2023-08-07 ENCOUNTER — TELEPHONE (OUTPATIENT)
Dept: FAMILY MEDICINE | Facility: CLINIC | Age: 68
End: 2023-08-07
Payer: MEDICARE

## 2023-08-07 ENCOUNTER — HOSPITAL ENCOUNTER (OUTPATIENT)
Dept: PREADMISSION TESTING | Facility: HOSPITAL | Age: 68
Discharge: HOME | End: 2023-08-07
Payer: MEDICARE

## 2023-08-07 ENCOUNTER — TELEPHONE (OUTPATIENT)
Dept: PREADMISSION TESTING | Facility: HOSPITAL | Age: 68
End: 2023-08-07

## 2023-08-07 NOTE — PHYSICIAN QUERY
PT Name: Vick Gonzalez  MR #: [id number]     Documentation Clarification      CDS/: Deja Pedroza rn              Contact information:[email or web address]    This form is a permanent document in the medical record.     Query Date: August 7, 2023    By submitting this query, we are merely seeking further clarification of documentation. Please utilize your independent clinical judgment when addressing the question(s) below.    The Medical Record reflects the following:    Clinical Findings Location in Medical Records   He was found to have an NSTEMI with a troponin of 1.55 and with plans for Cath lab for procedure. Admitted to ICU for nitro drip and heparin gtt. LHC performed with non-obstructive CAD and no stents placed. Plan to continue ASA for life and plavix for at least 1 year with high intensity statin. Patient tolerated cath well, was stepped down from ICU 8/2 and with no returning symptoms. Discharge summary         Due to the conflicting clinical picture, please clinically validate the diagnosis of NSTEMI.    If validated, please provide additional clinical support for the diagnosis.       [   ] Above stated diagnosis is not confirmed and/or it has been ruled out   [   ] Above stated diagnosis is not confirmed and/or it has been ruled out, other diagnosis ruled in (please          specify):_______________   [ x  ] Above stated diagnosis is confirmed. Please specify clinical support (signs, symptoms, and treatment) for  the confirmed diagnosis: ____________________   [   ] Other clarification (please specify): ___________________     Form No. [id number]

## 2023-08-07 NOTE — TELEPHONE ENCOUNTER
Yes I sent a prescription for ProAir to the [facility] pharmacy on 08/04/2023 with instructions that there can substitute any insurance covered equivolent inhaler of albuterol such as ProAir, Ventolin, Proventil)

## 2023-08-07 NOTE — TELEPHONE ENCOUNTER
----- Message from Analilia Mata sent at 8/7/2023  1:10 PM CDT -----  Type:  Pharmacy Calling to Clarify an RX    Name of Caller: Leesa  Pharmacy Name: [facility]  Prescription Name: albuterol (PROAIR HFA) 90 mcg/actuation inhaler [23668]  What do they need to clarify?: Pharm wants to know if they can change prescription to pro air.   Insurance did not cover prescription noted above.     Best Call Back Number: [phone]  Additional Information:

## 2023-08-07 NOTE — DISCHARGE INSTRUCTIONS
Your surgery is scheduled for 8/14/23.    Please report to [facility] on the 1st Floor at 0800 a.m.    THIS TIME IS SUBJECT TO CHANGE.  YOU WILL RECEIVE A PHONE CALL THE DAY BEFORE SURGERY BY 3:30 PM TO CONFIRM YOUR TIME OF ARRIVAL.  IF YOU HAVE NOT RECEIVED A PHONE CALL BY 3:30 PM THE DAY BEFORE YOUR SURGERY PLEASE CALL [phone]     INSTRUCTIONS IMPORTANT!!!  ¨ Do not eat or drink after 12 midnight-including water, candy, gum, & mints. OK to brush teeth.      ____  Proceed to [facility] on *** for additional testing.        ____  Do not wear makeup, including mascara.  ____  No powder, lotions or creams to surgical area.  ____  Please remove all jewelry, including piercings and leave at home.  ____  No money or valuables needed. Please leave at home.  ____  Please bring any documents given by your doctor.  ____  If going home the same day, arrange for a ride home. You will not be able to             drive if Anesthesia was used.  ____  Children under 18 years require a parent / guardian present the entire time             they are in surgery / recovery.  ____  Wear loose fitting clothing. Allow for dressings, bandages.  ____  Stop Aspirin, Ibuprofen, Motrin, Aleve, Goody's/BC powders, Excedrine and Naproxen (NSAIDS) at least 3-5 days before surgery, unless otherwise instructed by your doctor, or the nurse.   You MAY use Tylenol/acetaminophen until day of surgery.  ____  Wash the surgical area with Hibiclens or Dial Antibacterial bar soap the night before surgery, and again the             morning of surgery.  Be sure to rinse hibiclens or Dial Antibacterial bar soap off completely (if instructed by   nurse).  ____  If you take diabetic medication including Metformin, Glimepiride, Glipizide, Glyburide, Byetta, Januvia, Actos, do not take am of surgery unless instructed by Doctor.  ____ Hold Invokana, Farxiga, and Jardiance for 3 days prior to surgery.   ____  Call MD for temperature  above 101 degrees or any other signs of infection such as Urinary (bladder) infection, Upper respiratory infection, skin boils, etc.  ____ Stop taking any Fish Oil supplement or any Vitamins that contain Vitamin E at least 5 days prior to surgery.  ____ Do Not wear your contact lenses the day of your procedure.  You may wear your glasses.      ____Do not shave surgical site for 3 days prior to surgery.  ____ Practice Good hand washing before, during, and after procedure.      I have read or had read and explained to me, and understand the above information.  Additional comments or instructions:  For additional questions call [phone]      ANESTHESIA SIDE EFFECTS  -For the first 24 hours after surgery:  Do not drive, use heavy equipment, make important decisions, or drink alcohol  -It is normal to feel sleepy for several hours.  Rest until you are more awake.  -Have someone stay with you, if needed.  They can watch for problems and help keep you safe.  -Some possible post anesthesia side effects include: nausea and vomiting, sore throat and hoarseness, sleepiness, and dizziness.        Pre-Op Bathing Instructions    Before surgery, you can play an important role in your own health.    Because skin is not sterile, we need to be sure that your skin is as free of germs as possible. By following the instructions below, you can reduce the number of germs on your skin before surgery.    IMPORTANT: You will need to shower with a special soap called Hibiclens*, available at any pharmacy.  If you are allergic to Chlorhexidine (the antiseptic in Hibiclens), use an antibacterial soap such as Dial Soap for your preoperative shower.  You will shower with Hibiclens both the night before your surgery and the morning of your surgery.  Do not use Hibiclens on the head, face or genitals to avoid injury to those areas.    STEP #1: THE NIGHT BEFORE YOUR SURGERY     Do not shave the area of your body where your surgery will be  performed.  Shower and wash your hair and body as usual with your normal soap and shampoo.  Rinse your hair and body thoroughly after you shower to remove all soap residue.  With your hand, apply one packet of Hibiclens soap to the surgical site.   Wash the site gently for five (5) minutes. Do not scrub your skin too hard.   Do not wash with your regular soap after Hibiclens is used.  Rinse your body thoroughly.  Pat yourself dry with a clean, soft towel.  Do not use lotion, cream, or powder.  Wear clean clothes.    STEP #2: THE MORNING OF YOUR SURGERY     Repeat Step #1.    * Not to be used by people allergic to Chlorhexidine.

## 2023-08-07 NOTE — TELEPHONE ENCOUNTER
Good morning, he is scheduled for Fusion with Dr Christian on 8/14/23. Per Dr Christian's note he will need PCP, Cardiology, and transplant clearance. Do you have this. Also takes Plavix and will need recommendations. Thanks

## 2023-08-07 NOTE — TELEPHONE ENCOUNTER
----- Message from Raul Moseley sent at 8/7/2023 11:37 AM CDT -----  Contact: Pt  .Type:  Needs Medical Advice    Who Called: pt    Would the patient rather a call back or a response via [facility]?  Call back  Best Call Back Number: [phone]  Additional Information: Pt. Is calling regarding his paperwork for his upcoming surgery. 08/14/2023

## 2023-08-07 NOTE — PHYSICIAN QUERY
PT Name: Vick Gonzalez  MR #: [id number]    DOCUMENTATION CLARIFICATION      CDS/: Deja Pedroza rn              Contact information: [email or web address]    This form is a permanent document in the medical record.     Query Date: August 7, 2023    By submitting this query, we are merely seeking further clarification of documentation. Please utilize your independent clinical judgment when addressing the question(s) below.    The Medical Record contains the following:   Indicators   Supporting Clinical Findings Location in Medical Record   x Hypertension associated with diabetes documented Hypertension associated with Type 2 Diabetes   H&P   x Chronic condition(s) CHF H&P    Vital signs     x Treatment/Medication Continue home regimen of Metoprolol Succinate 200 mg daily, Hydralazine 35 mg, Isosorbide dinitrate 20 mg 3 times daily H&P    Other     Provider, please clarify the relationship between the Hypertension and Diabetes Mellitus  [  x ] Hypertension is a manifestation of Diabetes Mellitus (Secondary Hypertension)   [   ]  Hypertension is not a manifestation of Diabetes Mellitus (Essential Hypertension)   [   ] Other Clarification (please specify): ____________   [  ] Clinically Undetermined       Please document in your progress notes daily for the duration of treatment, until resolved, and include in your discharge summary.

## 2023-08-07 NOTE — TELEPHONE ENCOUNTER
Returned patient's call. I explained that due to his recent hospitalization, Dr Lopez recommends that his surgery clearance come from Cardiology. He said that he didn't have a heart attack, but that it was just thought that he did. I told him because of the hospital stay, it was appropriate to get clearance from Cardiology prior to having the procedure done. He said he would speak with them tomorrow.

## 2023-08-07 NOTE — TELEPHONE ENCOUNTER
Pharmacy asking if it's OK to change prescription to Pro Air, which is covered by the patient's insurance?

## 2023-08-08 ENCOUNTER — TELEPHONE (OUTPATIENT)
Dept: FAMILY MEDICINE | Facility: CLINIC | Age: 68
End: 2023-08-08
Payer: MEDICARE

## 2023-08-08 NOTE — TELEPHONE ENCOUNTER
Patient is changing to Dexcom 7 and sensors  new orders need to be placed in order to send to [facility]  . Patient is  also requesting a need clearance and labs before procedure

## 2023-08-08 NOTE — TELEPHONE ENCOUNTER
----- Message from Bessie Francis sent at 8/8/2023  3:41 PM CDT -----  Contact: pt  Type:  Clearance    Who Called: pt   Would the patient rather a call back or a response via [facility]? call  Best Call Back Number: [phone]  Additional Information:   Pt requesting a call regarding he need clearance and labs before procedure

## 2023-08-09 ENCOUNTER — TELEPHONE (OUTPATIENT)
Dept: NEUROSURGERY | Facility: CLINIC | Age: 68
End: 2023-08-09
Payer: MEDICARE

## 2023-08-09 ENCOUNTER — TELEPHONE (OUTPATIENT)
Dept: FAMILY MEDICINE | Facility: CLINIC | Age: 68
End: 2023-08-09
Payer: MEDICARE

## 2023-08-09 DIAGNOSIS — E11.42 TYPE 2 DIABETES MELLITUS WITH DIABETIC POLYNEUROPATHY, WITH LONG-TERM CURRENT USE OF INSULIN: Primary | ICD-10-CM

## 2023-08-09 DIAGNOSIS — Z79.4 TYPE 2 DIABETES MELLITUS WITH DIABETIC POLYNEUROPATHY, WITH LONG-TERM CURRENT USE OF INSULIN: Primary | ICD-10-CM

## 2023-08-09 RX ORDER — BLOOD-GLUCOSE,RECEIVER,CONT
EACH MISCELLANEOUS
Qty: 1 EACH | Refills: 11 | Status: SHIPPED | OUTPATIENT
Start: 2023-08-09 | End: 2023-12-11 | Stop reason: SDUPTHER

## 2023-08-09 RX ORDER — BLOOD-GLUCOSE SENSOR
EACH MISCELLANEOUS
Qty: 3 EACH | Refills: 11 | Status: SHIPPED | OUTPATIENT
Start: 2023-08-09 | End: 2023-12-11 | Stop reason: SDUPTHER

## 2023-08-09 NOTE — TELEPHONE ENCOUNTER
He was just hospitalized a week ago for an MI. He will need to see cardiology for clearance on this prior to having any surgery.     Will print G7 Dexcom supplies RX

## 2023-08-09 NOTE — TELEPHONE ENCOUNTER
----- Message from Deepthi Jarvis MA sent at 8/8/2023  5:16 PM CDT -----  Patient is  also requesting a need clearance and labs before procedure

## 2023-08-09 NOTE — PHYSICIAN QUERY
PT Name: Vick Gonzalez  MR #: [id number]     DOCUMENTATION CLARIFICATION     CDS/: Deja Pedroza rn              Contact information:[email or web address]  This form is a permanent document in the medical record.     Query Date: August 9, 2023    By submitting this query, we are merely seeking further clarification of documentation.  Please utilize your independent clinical judgment when addressing the question(s) below.    The Medical Record contains the following             Clinical Findings   Location in Medical Record   He was found to have an NSTEMI with a troponin of 1.55 and with plans for Cath lab for procedure. Admitted to ICU for nitro drip and heparin gtt. LHC performed with non-obstructive CAD and no stents placed. Plan to continue ASA for life and plavix for at least 1 year with high intensity statin. Patient tolerated cath well, was stepped down from ICU 8/2 and with no returning symptoms Dc summary         (NSTEMI) has been confirmed as a diagnosis via query signed (8/8/23 2:47PM).    Based on your medical judgment and in order to clinically support the documented diagnosis, please document the clinical indicators (signs, symptoms, & treatment) that were utilized to support this diagnosis:    [ x  ]  Signs, Symptoms, & Treatment: ____Confirmed NSTEMI per [facility]- needs PET stress____________________________________     [   ]  Above stated diagnosis is not confirmed and/or it has been ruled out     [   ]  Above stated diagnosis is not confirmed and/or it has been ruled out, other diagnosis ruled in (please          specify):_______________     [   ]  Other clarification (please specify): ___________________________________       Form No. [id number]

## 2023-08-09 NOTE — TELEPHONE ENCOUNTER
Faxed order, [facility] form, A1c results, and chart notes to [facility].    I have explained to the patient, by phone and in person, that he needs to discuss pre-op clearance with Cardiology.

## 2023-08-09 NOTE — TELEPHONE ENCOUNTER
Returned call to pt, pt stated that he went to his PCP yesterday to get cleared for his surgery on 8/28 with  and his PCP stated to him that he has to get cleared by his cardiologist before he clears him. Pt is requesting that we send over the clearance sheet to his cardiologist. I stated to pt that I will send the clearance sheet to his cardiologist but remember he has to get cleared by his PCP also. I stated that his PCP has to order all those test that are listed on the sheet that was given to him or his surgery will have to be rescheduled. Pt voiced understanding

## 2023-08-18 ENCOUNTER — OFFICE VISIT (OUTPATIENT)
Dept: CARDIOLOGY | Facility: CLINIC | Age: 68
End: 2023-08-18
Payer: MEDICARE

## 2023-08-18 VITALS
DIASTOLIC BLOOD PRESSURE: 63 MMHG | WEIGHT: 256.63 LBS | HEART RATE: 76 BPM | SYSTOLIC BLOOD PRESSURE: 123 MMHG | OXYGEN SATURATION: 98 % | BODY MASS INDEX: 32.94 KG/M2 | HEIGHT: 74 IN

## 2023-08-18 DIAGNOSIS — I21.4 NSTEMI (NON-ST ELEVATED MYOCARDIAL INFARCTION): ICD-10-CM

## 2023-08-18 DIAGNOSIS — Z79.4 TYPE 2 DIABETES MELLITUS WITH DIABETIC POLYNEUROPATHY, WITH LONG-TERM CURRENT USE OF INSULIN: ICD-10-CM

## 2023-08-18 DIAGNOSIS — I25.10 CORONARY ARTERY DISEASE, UNSPECIFIED VESSEL OR LESION TYPE, UNSPECIFIED WHETHER ANGINA PRESENT, UNSPECIFIED WHETHER NATIVE OR TRANSPLANTED HEART: ICD-10-CM

## 2023-08-18 DIAGNOSIS — E11.42 TYPE 2 DIABETES MELLITUS WITH DIABETIC POLYNEUROPATHY, WITH LONG-TERM CURRENT USE OF INSULIN: ICD-10-CM

## 2023-08-18 DIAGNOSIS — N18.30 STAGE 3 CHRONIC KIDNEY DISEASE, UNSPECIFIED WHETHER STAGE 3A OR 3B CKD: ICD-10-CM

## 2023-08-18 DIAGNOSIS — I50.32 CHRONIC HEART FAILURE WITH PRESERVED EJECTION FRACTION: ICD-10-CM

## 2023-08-18 DIAGNOSIS — Z01.810 PREOP CARDIOVASCULAR EXAM: Primary | ICD-10-CM

## 2023-08-18 PROCEDURE — 99999 PR PBB SHADOW E&M-EST. PATIENT-LVL III: ICD-10-PCS | Mod: PBBFAC,,, | Performed by: PHYSICIAN ASSISTANT

## 2023-08-18 PROCEDURE — 3078F DIAST BP <80 MM HG: CPT | Mod: CPTII,S$GLB,, | Performed by: PHYSICIAN ASSISTANT

## 2023-08-18 PROCEDURE — 3044F HG A1C LEVEL LT 7.0%: CPT | Mod: CPTII,S$GLB,, | Performed by: PHYSICIAN ASSISTANT

## 2023-08-18 PROCEDURE — 4010F PR ACE/ARB THEARPY RXD/TAKEN: ICD-10-PCS | Mod: CPTII,S$GLB,, | Performed by: PHYSICIAN ASSISTANT

## 2023-08-18 PROCEDURE — 4010F ACE/ARB THERAPY RXD/TAKEN: CPT | Mod: CPTII,S$GLB,, | Performed by: PHYSICIAN ASSISTANT

## 2023-08-18 PROCEDURE — 3074F SYST BP LT 130 MM HG: CPT | Mod: CPTII,S$GLB,, | Performed by: PHYSICIAN ASSISTANT

## 2023-08-18 PROCEDURE — 1111F DSCHRG MED/CURRENT MED MERGE: CPT | Mod: CPTII,S$GLB,, | Performed by: PHYSICIAN ASSISTANT

## 2023-08-18 PROCEDURE — 1126F PR PAIN SEVERITY QUANTIFIED, NO PAIN PRESENT: ICD-10-PCS | Mod: CPTII,S$GLB,, | Performed by: PHYSICIAN ASSISTANT

## 2023-08-18 PROCEDURE — 3044F PR MOST RECENT HEMOGLOBIN A1C LEVEL <7.0%: ICD-10-PCS | Mod: CPTII,S$GLB,, | Performed by: PHYSICIAN ASSISTANT

## 2023-08-18 PROCEDURE — 99214 OFFICE O/P EST MOD 30 MIN: CPT | Mod: S$GLB,,, | Performed by: PHYSICIAN ASSISTANT

## 2023-08-18 PROCEDURE — 3078F PR MOST RECENT DIASTOLIC BLOOD PRESSURE < 80 MM HG: ICD-10-PCS | Mod: CPTII,S$GLB,, | Performed by: PHYSICIAN ASSISTANT

## 2023-08-18 PROCEDURE — 1111F PR DISCHARGE MEDS RECONCILED W/ CURRENT OUTPATIENT MED LIST: ICD-10-PCS | Mod: CPTII,S$GLB,, | Performed by: PHYSICIAN ASSISTANT

## 2023-08-18 PROCEDURE — 3008F BODY MASS INDEX DOCD: CPT | Mod: CPTII,S$GLB,, | Performed by: PHYSICIAN ASSISTANT

## 2023-08-18 PROCEDURE — 99999 PR PBB SHADOW E&M-EST. PATIENT-LVL III: CPT | Mod: PBBFAC,,, | Performed by: PHYSICIAN ASSISTANT

## 2023-08-18 PROCEDURE — 3008F PR BODY MASS INDEX (BMI) DOCUMENTED: ICD-10-PCS | Mod: CPTII,S$GLB,, | Performed by: PHYSICIAN ASSISTANT

## 2023-08-18 PROCEDURE — 1126F AMNT PAIN NOTED NONE PRSNT: CPT | Mod: CPTII,S$GLB,, | Performed by: PHYSICIAN ASSISTANT

## 2023-08-18 PROCEDURE — 3074F PR MOST RECENT SYSTOLIC BLOOD PRESSURE < 130 MM HG: ICD-10-PCS | Mod: CPTII,S$GLB,, | Performed by: PHYSICIAN ASSISTANT

## 2023-08-18 PROCEDURE — 99214 PR OFFICE/OUTPT VISIT, EST, LEVL IV, 30-39 MIN: ICD-10-PCS | Mod: S$GLB,,, | Performed by: PHYSICIAN ASSISTANT

## 2023-08-18 NOTE — PATIENT INSTRUCTIONS
Please stop taking aspirin 82 mg and clopidogrel (AKA Plavix) 75 mg 5 days prior to your surgery. You should restart these medications as soon as your surgeon says it's safe to do so.

## 2023-08-18 NOTE — PROGRESS NOTES
Cardiology Clinic Note  Reason for Visit: Preop evaluation   LOV w/ Dr. Robison: 7/24/2023    HPI:     Problem List and HPI:   NSTEMI 8/2023  Non obstructive CAD per LHC 8/2/2023  HFpEF  Liver transplant 2009  - ETOH liver disease  HCV  DM on insulin  Hypertension  PAD   - (L) AT PTA 11/2017  CKD III  Gout  Hypothyroidism       Vick Gonzalez is a 67 y.o. M, who presents for preop evaluation. He is planning to undergo cervical spine fusion with Dr. Christian 9/25/2023. His significant cardiac diagnoses include non-obstructive CAD and HFpEF. He was recently hospitalized for NSTEMI on 8/3, and subsequently underwent LHC which revealed 60% mid LAD stenosis, 80% ostial OM1 stenosis and 40% mid RCA stenosis. He was recommended for medical therapy and Stent was not placed. His troponin was elevated, but essentially flat,ranging from 1.310 to 1.715. Possibly related to substance use reflected in patient's UDS. BNP was normal at 24 and TTE showed an EF of 55-60% and grade I diastolic dysfunction. Entresto was held due to OSMANY, Cr improved to 1.1 by the time of discharge. He takes furosemide 40 mg PRN for weight gain. He weighs daily, and has not needed to take a dose in > 2 months. He denies any chest pains since discharge. He works on arm and balance strengthening daily at home, but he does not participate in any regular CV exercise right now. He's having significant balance issues that he hopes the surgery might improve.     ROS:    Pertinent ROS included in HPI and below.  PMH:     Past Medical History:   Diagnosis Date    Acute congestive heart failure 5/12/2023    Anemia     Anxiety     Cataract     Chronic pain syndrome 07/13/2011    CKD (chronic kidney disease), stage III     Diabetes mellitus type II, uncontrolled     Discitis of lumbosacral region 01/16/2015    ED (erectile dysfunction)     Encounter for blood transfusion     Genital herpes     Gout, arthritis     History of alcohol abuse     History of hepatitis  C, s/p successful Rx w/ SVR24 (cure) - 5/2018 08/23/2011    Completed 24wks Epclusa + RBV w/SVR12 - 2/2018  -     History of positive PPD, treatment status unknown     Pulmonary granulomas, negative sputum cultures for AFB and indeterminate quantferon test    History of substance abuse     Hypertension     Hypothyroidism     Liver replaced by transplant 08/23/2011    DATE: 12/16/2013  LIVER BIOPSY : REASON:  hep C staging  PATHOLOGY COMMITTEE NOTE/PLAN: :grade  1 / stage 1        Pancreatitis 2016    Peptic ulcer disease     Pulmonary emphysema, unspecified emphysema type 5/26/2023     Past Surgical History:   Procedure Laterality Date    ANTERIOR CERVICAL DISCECTOMY W/ FUSION N/A 8/22/2022    Procedure: Procedure: ACDF 4-7 LOS: 4.0 Anesthesia: General Blood: Type & Screen Radiology: C-Arm Microscope: ------- SNS: EMG, SEP, MEP Brace: [place] Bed: Regular Bed, Shoulder Strap Headrest:------ Position: Supine Equipment: Depuy;  Surgeon: Titi Christian MD;  Location: [facility] OR;  Service: Neurosurgery;  Laterality: N/A;  Depuy  confirmed CW 8/19  Neuro monitoring confirmed CW 8/19    CARPAL TUNNEL RELEASE Left 10/29/2021    Procedure: RELEASE, CARPAL TUNNEL;  Surgeon: Jameson Alejo Jr., MD;  Location: [facility] OR;  Service: Orthopedics;  Laterality: Left;    CHOLECYSTECTOMY      CORONARY ANGIOGRAPHY N/A 8/2/2023    Procedure: ANGIOGRAM, CORONARY ARTERY;  Surgeon: Juan Vera MD;  Location: [facility] CATH LAB/EP;  Service: Cardiology;  Laterality: N/A;    DECOMPRESSION OF CERVICAL SPINE BY ANTERIOR APPROACH WITH FUSION  8/22/2022    Procedure: DECOMPRESSION AND FUSION, SPINE, CERVICAL, ANTERIOR APPROACH;  Surgeon: Titi Christian MD;  Location: [facility] OR;  Service: Neurosurgery;;    INJECTION OF JOINT Right 12/2/2019    Procedure: INJECTION, JOINT SI;  Surgeon: Thu Penn MD;  Location: [facility] PAIN MGT;  Service: Pain Management;  Laterality: Right;  RT SI JNT INJ    LEFT HEART CATHETERIZATION Left 8/2/2023    Procedure:  Left heart cath;  Surgeon: Juan Vera MD;  Location: [facility] CATH LAB/EP;  Service: Cardiology;  Laterality: Left;    LIVER TRANSPLANT  06/2010    SPINE SURGERY      TRANSFORAMINAL EPIDURAL INJECTION OF STEROID Left 5/29/2023    Procedure: INJECTION, STEROID, EPIDURAL, TRANSFORAMINAL APPROACH,  LEFT C7-T1 DIRECT REF;  Surgeon: Thu Penn MD;  Location: [facility] PAIN MGT;  Service: Pain Management;  Laterality: Left;  4/3 MD ILL/PT WILL C/B     Allergies:   Review of patient's allergies indicates:  No Known Allergies  Medications:     Current Outpatient Medications on File Prior to Visit   Medication Sig Dispense Refill    albuterol (PROAIR HFA) 90 mcg/actuation inhaler Inhale 2 puffs into the lungs every 6 (six) hours as needed for Wheezing. Rescue 18 g 5    allopurinoL (ZYLOPRIM) 100 MG tablet TAKE 1 TABLET BY MOUTH TWICE A DAY (Patient taking differently: Take 100 mg by mouth once daily.) 180 tablet 3    aluminum-magnesium hydroxide-simethicone (MAALOX) 200-200-20 mg/5 mL Susp Take 30 mLs by mouth 4 (four) times daily before meals and nightly. (Patient taking differently: Take 30 mLs by mouth every 6 (six) hours as needed.) 769 mL 0    aspirin 81 MG Chew Take 1 tablet (81 mg total) by mouth once daily. 90 tablet 3    atorvastatin (LIPITOR) 40 MG tablet Take 1 tablet (40 mg total) by mouth once daily. 90 tablet 3    blood sugar diagnostic (TRUE METRIX GLUCOSE TEST STRIP) Strp USE 3 TIMES DAILY TO TEST BLOOD GLUCOSE LEVEL 100 strip 11    blood-glucose meter Misc 1 Device by Misc.(Non-Drug; Combo Route) route 3 (three) times daily. 1 each 0    blood-glucose meter,continuous (DEXCOM G7 ) Misc Use as directed. 1 each 11    blood-glucose sensor (DEXCOM G7 SENSOR) Viviane Use as directed. 3 each 11    blood-glucose transmitter (DEXCOM G6 TRANSMITTER) Viviane Use as directed 1 each 11    calcium carbonate-vitamin D3 (CALCIUM 600 WITH VITAMIN D3) 600 mg(1,500mg) -400 unit Chew Take 1 tablet by mouth once daily.        "clopidogreL (PLAVIX) 75 mg tablet Take 1 tablet (75 mg total) by mouth once daily. 30 tablet 11    cyanocobalamin (VITAMIN B-12) 100 MCG tablet Take 100 mcg by mouth once daily.      diphenhydrAMINE (BENADRYL) 25 mg capsule Take 25 mg by mouth daily as needed for Allergies (Cold).      gabapentin (NEURONTIN) 800 MG tablet Take 1 tablet (800 mg total) by mouth 3 (three) times daily. 90 tablet 11    insulin glargine U-300 conc (TOUJEO MAX U-300 SOLOSTAR) 300 unit/mL (3 mL) insulin pen Inject 40 Units into the skin once daily. (Patient taking differently: Inject 40 Units into the skin every evening.) 3 mL 11    insulin lispro (HUMALOG KWIKPEN INSULIN) 100 unit/mL pen Inject 20 Units into the skin 3 (three) times daily with meals. 15 mL 11    isosorbide-hydrALAZINE 20-37.5 mg (BIDIL) 20-37.5 mg Tab Take 1 tablet by mouth 3 (three) times daily. 90 tablet 11    lancets 30 gauge Misc 1 lancet by Misc.(Non-Drug; Combo Route) route 4 (four) times daily before meals and nightly. 200 each 11    levothyroxine (SYNTHROID) 88 MCG tablet TAKE 1 TABLET BY MOUTH EVERY DAY 90 tablet 1    metoprolol succinate (TOPROL-XL) 200 MG 24 hr tablet TAKE 1 TABLET BY MOUTH EVERY DAY 90 tablet 3    multivit with min-folic acid (MEN'S MULTIVITAMIN GUMMIES) 200 mcg Chew Take 1 tablet by mouth once daily.      NOVOFINE PLUS 32 gauge x 1/6" Ndle       sacubitriL-valsartan (ENTRESTO) 49-51 mg per tablet Take 1 tablet by mouth 2 (two) times daily. 60 tablet 3    traZODone (DESYREL) 50 MG tablet TAKE 1 TABLET BY MOUTH EVERY DAY IN THE EVENING 90 tablet 3    furosemide (LASIX) 20 MG tablet Take 2 tablets (40 mg total) by mouth daily as needed (For Weight Gain > 2-3 lbs in 1 day or 4-6 lbs over 1 week notify PCP and take 40 mg daily for three days). 60 tablet 0    tacrolimus (PROGRAF) 1 MG Cap Take 2 capsules (2 mg total) by mouth every morning AND 1 capsule (1 mg total) every evening. (Patient not taking: Reported on 8/18/2023) 90 capsule 11    " "[DISCONTINUED] insulin aspart U-100 (NOVOLOG FLEXPEN U-100 INSULIN) 100 unit/mL (3 mL) InPn pen Inject 20 Units into the skin 3 (three) times daily with meals. 15 mL 11     No current facility-administered medications on file prior to visit.     Social History:     Social History     Tobacco Use    Smoking status: Former     Current packs/day: 0.00     Passive exposure: Never    Smokeless tobacco: Never   Substance Use Topics    Alcohol use: No     Comment: over 5 years ago, none currently     Family History:     Family History   Problem Relation Age of Onset    Cancer Mother     Diabetes Mother     Heart disease Mother     Diabetes Sister     Cancer Maternal Uncle 82        colon CA    Drug abuse Daughter     Melanoma Neg Hx     Psoriasis Neg Hx     Lupus Neg Hx     Eczema Neg Hx     Acne Neg Hx      Physical Exam:   /63   Pulse 76   Ht 6' 2" (1.88 m)   Wt 116.4 kg (256 lb 9.9 oz)   SpO2 98%   BMI 32.95 kg/m²      Physical Exam  Vitals and nursing note reviewed.   Constitutional:       Appearance: Normal appearance. He is obese.      Comments: Using a cane to ambulate    HENT:      Head: Normocephalic and atraumatic.   Neck:      Vascular: No carotid bruit or hepatojugular reflux.   Cardiovascular:      Rate and Rhythm: Normal rate and regular rhythm.      Pulses:           Radial pulses are 2+ on the right side and 2+ on the left side.        Dorsalis pedis pulses are 2+ on the right side and 2+ on the left side.        Posterior tibial pulses are 2+ on the right side and 2+ on the left side.      Heart sounds: S1 normal and S2 normal.   Pulmonary:      Effort: Pulmonary effort is normal.      Breath sounds: Normal breath sounds.   Abdominal:      General: Bowel sounds are normal.      Palpations: Abdomen is soft.      Tenderness: There is no abdominal tenderness.   Musculoskeletal:      Right lower le+ Edema present.      Left lower le+ Edema present.   Feet:      Right foot:      Skin " integrity: Skin integrity normal.      Left foot:      Skin integrity: Skin integrity normal.   Neurological:      Mental Status: He is alert.   Psychiatric:         Behavior: Behavior is cooperative.          Labs:     Blood Tests:  Lab Results   Component Value Date    BNP 24 08/02/2023     08/03/2023    K 4.0 08/03/2023     08/03/2023    CO2 22 (L) 08/03/2023    BUN 19 08/03/2023    CREATININE 1.1 08/03/2023     08/03/2023    HGBA1C 6.8 (H) 05/13/2023    MG 1.8 08/03/2023    AST 18 08/03/2023    ALT 16 08/03/2023    ALBUMIN 3.4 (L) 08/03/2023    ALBUMIN 4.5 07/25/2018    PROT 6.3 08/03/2023    BILITOT 0.4 08/03/2023    WBC 7.81 08/03/2023    HGB 11.8 (L) 08/03/2023    HCT 36.0 (L) 08/03/2023    HCT 39 02/27/2017    MCV 88 08/03/2023     (L) 08/03/2023    INR 1.0 08/02/2023    TSH 1.288 05/13/2023       Lab Results   Component Value Date    CHOL 143 08/03/2023    HDL 21 (L) 08/03/2023    TRIG 234 (H) 08/03/2023       Lab Results   Component Value Date    LDLCALC 75.2 08/03/2023         Imaging:     Echocardiogram  TTE 8/3/2023    Left Ventricle: The left ventricle is normal in size. Normal wall thickness. Normal wall motion. There is normal systolic function with a visually estimated ejection fraction of 55 - 60%. Grade I diastolic dysfunction.    Left Atrium: Left atrium is mildly dilated.    Right Ventricle: Normal right ventricular cavity size. Systolic function is normal.    Right Atrium: Right atrium is mildly dilated.    Aortic Valve: The aortic valve is a trileaflet valve. There is moderate aortic valve sclerosis.    Mitral Valve: There is no stenosis. The mean pressure gradient across the mitral valve is 1 mmHg at a heart rate of  bpm. There is trace regurgitation.    Tricuspid Valve: There is trace regurgitation.    Pulmonic Valve: There is no significant stenosis. The estimated PA systolic pressure is 20 mmHg.    IVC/SVC: Normal venous pressure at 3 mmHg.    Cath Lab  C  2023  Long and patent LM                60% mid LAD stenosis                LCX with luminal irregularities                80% ostial OM1 stenosis                40% mid RCA stenosis                 Tortuous vessels especially LCX                LVEDP 10 mmHg    [id number]  Normal sinus rhythm   Nonspecific T wave abnormality   Abnormal ECG     Assessment:     1. Preop cardiovascular exam    2. Coronary artery disease, unspecified vessel or lesion type, unspecified whether angina present, unspecified whether native or transplanted heart    3. NSTEMI (non-ST elevated myocardial infarction)    4. Chronic heart failure with preserved ejection fraction    5. Stage 3 chronic kidney disease, unspecified whether stage 3a or 3b CKD    6. Type 2 diabetes mellitus with diabetic polyneuropathy, with long-term current use of insulin        Plan:     Preop cardiovascular exam  RCRI score of 3 is consistent with 15 % jassi-operative risk of major adverse cardiac event (cardiac death, nonfatal MI, nonfatal cardiac arrest).  Functional capacity < 4 METs.  No symptoms of unstable cardiac disease.  No further cardiac testing will reduce surgical risk prior to proceeding with planned surgery.  I recommend holding aspirin and clopidogrel for 5 days prior to undergoing surgery, and restarting at the surgeon's discretion. The patient is moderate to high risk for anesthesia based on comorbid conditions, and I have advised him of the aforementioned risk at his visit today.    The use of IV fluids should be judicious during the preoperative period.     Coronary artery disease, unspecified vessel or lesion type, unspecified whether angina present, unspecified whether native or transplanted heart  NSTEMI (non-ST elevated myocardial infarction)  Discussed patient with Dr. Kimball today. Because the patient did not recently undergo PCI he can safely hold DAPT for 5 days prior to surgery. He should be considered for PET stress at follow up  to assess burden of OM1 stenosis, but this does not necessarily jeane to take place prior to surgery.     Chronic heart failure with preserved ejection fraction    Stage 3 chronic kidney disease, unspecified whether stage 3a or 3b CKD    Type 2 diabetes mellitus with diabetic polyneuropathy, with long-term current use of insulin      Signed:  Deepthi Da Silva PA-C  Cardiology     8/18/2023 9:47 AM    Follow-up:     Future Appointments   Date Time Provider Department Center   8/21/2023  8:45 AM LAB, [facility] [place]   8/24/2023  2:00 PM Taryn Corcoran MD [facility] IMPRI [place] [facility]   8/29/2023  3:15 PM [facility] ODC XR-B LIMIT 500 LBS [facility] XRAY OP [place] [facility]   9/5/2023 10:15 AM NOM OIC-US1 MASTER NOM ULTR [facility]   9/12/2023  8:30 AM Paige Gilbert PA-C [facility] NEUROSU [place] [facility]   9/15/2023 11:15 AM Jacques Haro MD [facility] UROLOGY [place] [facility]   9/18/2023  9:00 AM Roberto Jolley, SAPPHIRE [facility] PREADMT [facility]   9/26/2023 11:00 AM Deepthi Da Silva PA-C [facility] CARDIO [place]   9/26/2023  3:15 PM [facility] ODC XR-B LIMIT 500 LBS [facility] XRAY OP [place] [facility]   9/26/2023  4:30 PM Paige Gilbert PA-C [facility] NEUROSU [place] [facility]   12/5/2023  2:15 PM [facility] ODC XR-A LIMIT 350 LBS [facility] XRAY OP [place] [facility]   12/5/2023  3:40 PM Titi Christian MD [facility] NEUROSU [place] [facility]

## 2023-08-21 ENCOUNTER — LAB VISIT (OUTPATIENT)
Dept: LAB | Facility: HOSPITAL | Age: 68
End: 2023-08-21
Attending: INTERNAL MEDICINE
Payer: MEDICARE

## 2023-08-21 DIAGNOSIS — Z94.4 S/P LIVER TRANSPLANT: ICD-10-CM

## 2023-08-21 LAB
ALBUMIN SERPL BCP-MCNC: 3.6 G/DL (ref 3.5–5.2)
ALP SERPL-CCNC: 61 U/L (ref 55–135)
ALT SERPL W/O P-5'-P-CCNC: 19 U/L (ref 10–44)
ANION GAP SERPL CALC-SCNC: 8 MMOL/L (ref 8–16)
AST SERPL-CCNC: 17 U/L (ref 10–40)
BASOPHILS # BLD AUTO: 0.03 K/UL (ref 0–0.2)
BASOPHILS NFR BLD: 0.4 % (ref 0–1.9)
BILIRUB SERPL-MCNC: 0.6 MG/DL (ref 0.1–1)
BUN SERPL-MCNC: 17 MG/DL (ref 8–23)
CALCIUM SERPL-MCNC: 8.9 MG/DL (ref 8.7–10.5)
CHLORIDE SERPL-SCNC: 106 MMOL/L (ref 95–110)
CO2 SERPL-SCNC: 24 MMOL/L (ref 23–29)
CREAT SERPL-MCNC: 1.2 MG/DL (ref 0.5–1.4)
DIFFERENTIAL METHOD: ABNORMAL
EOSINOPHIL # BLD AUTO: 0.5 K/UL (ref 0–0.5)
EOSINOPHIL NFR BLD: 5.7 % (ref 0–8)
ERYTHROCYTE [DISTWIDTH] IN BLOOD BY AUTOMATED COUNT: 13.7 % (ref 11.5–14.5)
EST. GFR  (NO RACE VARIABLE): >60 ML/MIN/1.73 M^2
GLUCOSE SERPL-MCNC: 175 MG/DL (ref 70–110)
HCT VFR BLD AUTO: 39.9 % (ref 40–54)
HGB BLD-MCNC: 13.1 G/DL (ref 14–18)
IMM GRANULOCYTES # BLD AUTO: 0.02 K/UL (ref 0–0.04)
IMM GRANULOCYTES NFR BLD AUTO: 0.3 % (ref 0–0.5)
LYMPHOCYTES # BLD AUTO: 2.8 K/UL (ref 1–4.8)
LYMPHOCYTES NFR BLD: 35.2 % (ref 18–48)
MCH RBC QN AUTO: 28.9 PG (ref 27–31)
MCHC RBC AUTO-ENTMCNC: 32.8 G/DL (ref 32–36)
MCV RBC AUTO: 88 FL (ref 82–98)
MONOCYTES # BLD AUTO: 0.7 K/UL (ref 0.3–1)
MONOCYTES NFR BLD: 8.8 % (ref 4–15)
NEUTROPHILS # BLD AUTO: 3.9 K/UL (ref 1.8–7.7)
NEUTROPHILS NFR BLD: 49.6 % (ref 38–73)
NRBC BLD-RTO: 0 /100 WBC
PLATELET # BLD AUTO: 149 K/UL (ref 150–450)
PLATELET BLD QL SMEAR: ABNORMAL
PMV BLD AUTO: 13.6 FL (ref 9.2–12.9)
POTASSIUM SERPL-SCNC: 4 MMOL/L (ref 3.5–5.1)
PROT SERPL-MCNC: 7 G/DL (ref 6–8.4)
RBC # BLD AUTO: 4.53 M/UL (ref 4.6–6.2)
SODIUM SERPL-SCNC: 138 MMOL/L (ref 136–145)
WBC # BLD AUTO: 7.92 K/UL (ref 3.9–12.7)

## 2023-08-21 PROCEDURE — 85025 COMPLETE CBC W/AUTO DIFF WBC: CPT | Performed by: INTERNAL MEDICINE

## 2023-08-21 PROCEDURE — 80053 COMPREHEN METABOLIC PANEL: CPT | Performed by: INTERNAL MEDICINE

## 2023-08-21 PROCEDURE — 36415 COLL VENOUS BLD VENIPUNCTURE: CPT | Mod: PO | Performed by: INTERNAL MEDICINE

## 2023-08-21 PROCEDURE — 80197 ASSAY OF TACROLIMUS: CPT | Performed by: INTERNAL MEDICINE

## 2023-08-22 LAB — TACROLIMUS BLD-MCNC: 3.1 NG/ML (ref 5–15)

## 2023-08-24 ENCOUNTER — OFFICE VISIT (OUTPATIENT)
Dept: PRIMARY CARE CLINIC | Facility: CLINIC | Age: 68
End: 2023-08-24
Payer: MEDICARE

## 2023-08-24 VITALS
SYSTOLIC BLOOD PRESSURE: 105 MMHG | DIASTOLIC BLOOD PRESSURE: 69 MMHG | HEART RATE: 80 BPM | OXYGEN SATURATION: 96 % | BODY MASS INDEX: 32.98 KG/M2 | WEIGHT: 256.81 LBS

## 2023-08-24 DIAGNOSIS — Z94.4 S/P LIVER TRANSPLANT: Chronic | ICD-10-CM

## 2023-08-24 DIAGNOSIS — I21.4 NSTEMI (NON-ST ELEVATED MYOCARDIAL INFARCTION): Primary | ICD-10-CM

## 2023-08-24 DIAGNOSIS — I25.119 CORONARY ARTERY DISEASE INVOLVING NATIVE CORONARY ARTERY OF NATIVE HEART WITH ANGINA PECTORIS: ICD-10-CM

## 2023-08-24 PROBLEM — E83.42 HYPOMAGNESEMIA: Status: RESOLVED | Noted: 2023-05-13 | Resolved: 2023-08-24

## 2023-08-24 PROCEDURE — 1125F PR PAIN SEVERITY QUANTIFIED, PAIN PRESENT: ICD-10-PCS | Mod: CPTII,S$GLB,, | Performed by: INTERNAL MEDICINE

## 2023-08-24 PROCEDURE — 3288F PR FALLS RISK ASSESSMENT DOCUMENTED: ICD-10-PCS | Mod: CPTII,S$GLB,, | Performed by: INTERNAL MEDICINE

## 2023-08-24 PROCEDURE — 99999 PR PBB SHADOW E&M-EST. PATIENT-LVL III: CPT | Mod: PBBFAC,,, | Performed by: INTERNAL MEDICINE

## 2023-08-24 PROCEDURE — 99999 PR PBB SHADOW E&M-EST. PATIENT-LVL III: ICD-10-PCS | Mod: PBBFAC,,, | Performed by: INTERNAL MEDICINE

## 2023-08-24 PROCEDURE — 3074F SYST BP LT 130 MM HG: CPT | Mod: CPTII,S$GLB,, | Performed by: INTERNAL MEDICINE

## 2023-08-24 PROCEDURE — 1101F PT FALLS ASSESS-DOCD LE1/YR: CPT | Mod: CPTII,S$GLB,, | Performed by: INTERNAL MEDICINE

## 2023-08-24 PROCEDURE — 1111F PR DISCHARGE MEDS RECONCILED W/ CURRENT OUTPATIENT MED LIST: ICD-10-PCS | Mod: CPTII,S$GLB,, | Performed by: INTERNAL MEDICINE

## 2023-08-24 PROCEDURE — 4010F ACE/ARB THERAPY RXD/TAKEN: CPT | Mod: CPTII,S$GLB,, | Performed by: INTERNAL MEDICINE

## 2023-08-24 PROCEDURE — 1125F AMNT PAIN NOTED PAIN PRSNT: CPT | Mod: CPTII,S$GLB,, | Performed by: INTERNAL MEDICINE

## 2023-08-24 PROCEDURE — 3288F FALL RISK ASSESSMENT DOCD: CPT | Mod: CPTII,S$GLB,, | Performed by: INTERNAL MEDICINE

## 2023-08-24 PROCEDURE — 3008F BODY MASS INDEX DOCD: CPT | Mod: CPTII,S$GLB,, | Performed by: INTERNAL MEDICINE

## 2023-08-24 PROCEDURE — 1101F PR PT FALLS ASSESS DOC 0-1 FALLS W/OUT INJ PAST YR: ICD-10-PCS | Mod: CPTII,S$GLB,, | Performed by: INTERNAL MEDICINE

## 2023-08-24 PROCEDURE — 99214 PR OFFICE/OUTPT VISIT, EST, LEVL IV, 30-39 MIN: ICD-10-PCS | Mod: S$GLB,,, | Performed by: INTERNAL MEDICINE

## 2023-08-24 PROCEDURE — 1159F PR MEDICATION LIST DOCUMENTED IN MEDICAL RECORD: ICD-10-PCS | Mod: CPTII,S$GLB,, | Performed by: INTERNAL MEDICINE

## 2023-08-24 PROCEDURE — 4010F PR ACE/ARB THEARPY RXD/TAKEN: ICD-10-PCS | Mod: CPTII,S$GLB,, | Performed by: INTERNAL MEDICINE

## 2023-08-24 PROCEDURE — 99214 OFFICE O/P EST MOD 30 MIN: CPT | Mod: S$GLB,,, | Performed by: INTERNAL MEDICINE

## 2023-08-24 PROCEDURE — 1111F DSCHRG MED/CURRENT MED MERGE: CPT | Mod: CPTII,S$GLB,, | Performed by: INTERNAL MEDICINE

## 2023-08-24 PROCEDURE — 3044F HG A1C LEVEL LT 7.0%: CPT | Mod: CPTII,S$GLB,, | Performed by: INTERNAL MEDICINE

## 2023-08-24 PROCEDURE — 3008F PR BODY MASS INDEX (BMI) DOCUMENTED: ICD-10-PCS | Mod: CPTII,S$GLB,, | Performed by: INTERNAL MEDICINE

## 2023-08-24 PROCEDURE — 3078F DIAST BP <80 MM HG: CPT | Mod: CPTII,S$GLB,, | Performed by: INTERNAL MEDICINE

## 2023-08-24 PROCEDURE — 3078F PR MOST RECENT DIASTOLIC BLOOD PRESSURE < 80 MM HG: ICD-10-PCS | Mod: CPTII,S$GLB,, | Performed by: INTERNAL MEDICINE

## 2023-08-24 PROCEDURE — 3044F PR MOST RECENT HEMOGLOBIN A1C LEVEL <7.0%: ICD-10-PCS | Mod: CPTII,S$GLB,, | Performed by: INTERNAL MEDICINE

## 2023-08-24 PROCEDURE — 3074F PR MOST RECENT SYSTOLIC BLOOD PRESSURE < 130 MM HG: ICD-10-PCS | Mod: CPTII,S$GLB,, | Performed by: INTERNAL MEDICINE

## 2023-08-24 PROCEDURE — 1159F MED LIST DOCD IN RCRD: CPT | Mod: CPTII,S$GLB,, | Performed by: INTERNAL MEDICINE

## 2023-08-24 NOTE — PROGRESS NOTES
[facility]   New Visit Progress Note   Recent Hospital Discharge     PRESENTING HISTORY     Chief Complaint/Reason for Admission:  Follow up Hospital Discharge   PCP: Emanuel Lopez MD    History of Present Illness:  Mr. Vick Gonzalez is a 67 y.o. male who was recently admitted to the hospital.    [facility] Medicine Discharge Summary  Primary Team: [facility] Hospitalist Team B  Attending Physician: Diego Lea MD  Resident: Freddy  Intern: Anastacia  Date of Admit: 8/2/2023  Date of Discharge: 8/3/2023  Discharge to: Home  Condition: Stable/Improved  ___________________________________________________________________    Today:  [facility] for [facility] follow up.  Recently hospitalized for management of NSTEMI.  UDS POS for cocaine and opiates.   Admitted to [facility] Medicine service in ICU level of care in nitroglycerin and heparin infusions.  Seen in consultation with Cardiology teams.  LHC performed with finding of non obstructive coronary artery disease.   No stents placed.   Medical management recommended with Aspirin/Plavix x 1 year and high intensity statin therapy.   Patient discharged to home.     Patient unaccompanied today.  Ambulatory with cane.  Independent with ADL's.  Brought all medication bottles for review- reports compliance.  No chest pain or dyspnea since hospital discharge.       Review of Systems  General ROS: negative for chills, fever or weight loss  Psychological ROS: negative for hallucination, depression or suicidal ideation  Ophthalmic ROS: negative for blurry vision, photophobia or eye pain  ENT ROS: negative for epistaxis, sore throat or rhinorrhea  Respiratory ROS: no cough, shortness of breath, or wheezing  Cardiovascular ROS: no chest pain or dyspnea on exertion  Gastrointestinal ROS: no abdominal pain, change in bowel habits, or black/ bloody stools  Genito-Urinary ROS: no dysuria, trouble voiding, or hematuria  Musculoskeletal ROS: + chronic  neck pain, chronic bilateral arm paraesthesia  + chronic R leg weakness   Neurological ROS: no syncope or seizures; no ataxia  Dermatological ROS: negative for pruritis, rash and jaundice      PAST HISTORY:     Past Medical History:   Diagnosis Date    Acute congestive heart failure 5/12/2023    Anemia     Anxiety     Cataract     Chronic pain syndrome 07/13/2011    CKD (chronic kidney disease), stage III     Diabetes mellitus type II, uncontrolled     Discitis of lumbosacral region 01/16/2015    ED (erectile dysfunction)     Encounter for blood transfusion     Genital herpes     Gout, arthritis     History of alcohol abuse     History of hepatitis C, s/p successful Rx w/ SVR24 (cure) - 5/2018 08/23/2011    Completed 24wks Epclusa + RBV w/SVR12 - 2/2018  -     History of positive PPD, treatment status unknown     Pulmonary granulomas, negative sputum cultures for AFB and indeterminate quantferon test    History of substance abuse     Hypertension     Hypothyroidism     Liver replaced by transplant 08/23/2011    DATE: 12/16/2013  LIVER BIOPSY : REASON:  hep C staging  PATHOLOGY COMMITTEE NOTE/PLAN: :grade  1 / stage 1        Pancreatitis 2016    Peptic ulcer disease     Pulmonary emphysema, unspecified emphysema type 5/26/2023       Past Surgical History:   Procedure Laterality Date    ANTERIOR CERVICAL DISCECTOMY W/ FUSION N/A 8/22/2022    Procedure: Procedure: ACDF 4-7 LOS: 4.0 Anesthesia: General Blood: Type & Screen Radiology: C-Arm Microscope: ------- SNS: EMG, SEP, MEP Brace: [place] Bed: Regular Bed, Shoulder Strap Headrest:------ Position: Supine Equipment: Depuy;  Surgeon: Titi Christian MD;  Location: [facility] OR;  Service: Neurosurgery;  Laterality: N/A;  Depuy  confirmed CW 8/19  Neuro monitoring confirmed CW 8/19    CARPAL TUNNEL RELEASE Left 10/29/2021    Procedure: RELEASE, CARPAL TUNNEL;  Surgeon: Jameson Alejo Jr., MD;  Location: [facility] OR;  Service: Orthopedics;  Laterality: Left;    CHOLECYSTECTOMY       CORONARY ANGIOGRAPHY N/A 8/2/2023    Procedure: ANGIOGRAM, CORONARY ARTERY;  Surgeon: Juan Vera MD;  Location: [facility] CATH LAB/EP;  Service: Cardiology;  Laterality: N/A;    DECOMPRESSION OF CERVICAL SPINE BY ANTERIOR APPROACH WITH FUSION  8/22/2022    Procedure: DECOMPRESSION AND FUSION, SPINE, CERVICAL, ANTERIOR APPROACH;  Surgeon: Titi Christian MD;  Location: [facility] OR;  Service: Neurosurgery;;    INJECTION OF JOINT Right 12/2/2019    Procedure: INJECTION, JOINT SI;  Surgeon: Thu Penn MD;  Location: [facility] PAIN MGT;  Service: Pain Management;  Laterality: Right;  RT SI JNT INJ    LEFT HEART CATHETERIZATION Left 8/2/2023    Procedure: Left heart cath;  Surgeon: Juan Vera MD;  Location: [facility] CATH LAB/EP;  Service: Cardiology;  Laterality: Left;    LIVER TRANSPLANT  06/2010    SPINE SURGERY      TRANSFORAMINAL EPIDURAL INJECTION OF STEROID Left 5/29/2023    Procedure: INJECTION, STEROID, EPIDURAL, TRANSFORAMINAL APPROACH,  LEFT C7-T1 DIRECT REF;  Surgeon: Thu Penn MD;  Location: [facility] PAIN MGT;  Service: Pain Management;  Laterality: Left;  4/3 MD ILL/PT WILL C/B       Family History   Problem Relation Age of Onset    Cancer Mother     Diabetes Mother     Heart disease Mother     Diabetes Sister     Cancer Maternal Uncle 82        colon CA    Drug abuse Daughter     Melanoma Neg Hx     Psoriasis Neg Hx     Lupus Neg Hx     Eczema Neg Hx     Acne Neg Hx          MEDICATIONS & ALLERGIES:     Current Outpatient Medications on File Prior to Visit   Medication Sig Dispense Refill    albuterol (PROAIR HFA) 90 mcg/actuation inhaler Inhale 2 puffs into the lungs every 6 (six) hours as needed for Wheezing. Rescue 18 g 5    allopurinoL (ZYLOPRIM) 100 MG tablet TAKE 1 TABLET BY MOUTH TWICE A DAY (Patient taking differently: Take 100 mg by mouth once daily.) 180 tablet 3    aluminum-magnesium hydroxide-simethicone (MAALOX) 200-200-20 mg/5 mL Susp Take 30 mLs by mouth 4 (four) times daily before meals and  nightly. (Patient taking differently: Take 30 mLs by mouth every 6 (six) hours as needed.) 769 mL 0    aspirin 81 MG Chew Take 1 tablet (81 mg total) by mouth once daily. 90 tablet 3    atorvastatin (LIPITOR) 40 MG tablet Take 1 tablet (40 mg total) by mouth once daily. 90 tablet 3    blood sugar diagnostic (TRUE METRIX GLUCOSE TEST STRIP) Strp USE 3 TIMES DAILY TO TEST BLOOD GLUCOSE LEVEL 100 strip 11    blood-glucose meter Misc 1 Device by Misc.(Non-Drug; Combo Route) route 3 (three) times daily. 1 each 0    blood-glucose meter,continuous (DEXCOM G7 ) Misc Use as directed. 1 each 11    blood-glucose sensor (DEXCOM G7 SENSOR) Viviane Use as directed. 3 each 11    blood-glucose transmitter (DEXCOM G6 TRANSMITTER) Viviane Use as directed 1 each 11    calcium carbonate-vitamin D3 (CALCIUM 600 WITH VITAMIN D3) 600 mg(1,500mg) -400 unit Chew Take 1 tablet by mouth once daily.       clopidogreL (PLAVIX) 75 mg tablet Take 1 tablet (75 mg total) by mouth once daily. 30 tablet 11    cyanocobalamin (VITAMIN B-12) 100 MCG tablet Take 100 mcg by mouth once daily.      diphenhydrAMINE (BENADRYL) 25 mg capsule Take 25 mg by mouth daily as needed for Allergies (Cold).      furosemide (LASIX) 20 MG tablet Take 2 tablets (40 mg total) by mouth daily as needed (For Weight Gain > 2-3 lbs in 1 day or 4-6 lbs over 1 week notify PCP and take 40 mg daily for three days). 60 tablet 0    gabapentin (NEURONTIN) 800 MG tablet Take 1 tablet (800 mg total) by mouth 3 (three) times daily. 90 tablet 11    insulin glargine U-300 conc (TOUJEO MAX U-300 SOLOSTAR) 300 unit/mL (3 mL) insulin pen Inject 40 Units into the skin once daily. (Patient taking differently: Inject 40 Units into the skin every evening.) 3 mL 11    insulin lispro (HUMALOG KWIKPEN INSULIN) 100 unit/mL pen Inject 20 Units into the skin 3 (three) times daily with meals. 15 mL 11    lancets 30 gauge Misc 1 lancet by Misc.(Non-Drug; Combo Route) route 4 (four) times daily before  "meals and nightly. 200 each 11    levothyroxine (SYNTHROID) 88 MCG tablet TAKE 1 TABLET BY MOUTH EVERY DAY 90 tablet 1    metoprolol succinate (TOPROL-XL) 200 MG 24 hr tablet TAKE 1 TABLET BY MOUTH EVERY DAY 90 tablet 3    NOVOFINE PLUS 32 gauge x 1/6" Ndle       sacubitriL-valsartan (ENTRESTO) 49-51 mg per tablet Take 1 tablet by mouth 2 (two) times daily. 60 tablet 3    tacrolimus (PROGRAF) 1 MG Cap Take 2 capsules (2 mg total) by mouth every morning AND 1 capsule (1 mg total) every evening. 90 capsule 11    traZODone (DESYREL) 50 MG tablet TAKE 1 TABLET BY MOUTH EVERY DAY IN THE EVENING 90 tablet 3    isosorbide-hydrALAZINE 20-37.5 mg (BIDIL) 20-37.5 mg Tab Take 1 tablet by mouth 3 (three) times daily. (Patient not taking: Reported on 8/24/2023) 90 tablet 11    multivit with min-folic acid (MEN'S MULTIVITAMIN GUMMIES) 200 mcg Chew Take 1 tablet by mouth once daily.      [DISCONTINUED] insulin aspart U-100 (NOVOLOG FLEXPEN U-100 INSULIN) 100 unit/mL (3 mL) InPn pen Inject 20 Units into the skin 3 (three) times daily with meals. 15 mL 11     No current facility-administered medications on file prior to visit.        Review of patient's allergies indicates:  No Known Allergies    OBJECTIVE:     Vital Signs:  /69 (BP Location: Right arm, Patient Position: Sitting, BP Method: Small (Automatic))   Pulse 80   Wt 116.5 kg (256 lb 13.4 oz)   SpO2 96%   BMI 32.98 kg/m²   Wt Readings from Last 3 Encounters:   08/24/23 1417 116.5 kg (256 lb 13.4 oz)   08/18/23 1423 116.4 kg (256 lb 9.9 oz)   08/03/23 1110 112.5 kg (248 lb)   08/02/23 1330 112.5 kg (248 lb 0.3 oz)   08/02/23 0621 118.4 kg (261 lb)     Body mass index is 32.98 kg/m².   96%    Physical Exam:  /69 (BP Location: Right arm, Patient Position: Sitting, BP Method: Small (Automatic))   Pulse 80   Wt 116.5 kg (256 lb 13.4 oz)   SpO2 96%   BMI 32.98 kg/m²   General appearance: alert, cooperative, no distress  Constitutional:Oriented to person, place, " and time  + appears well-developed and well-nourished.   HEENT: Normocephalic, atraumatic, neck symmetrical, no nasal discharge   Eyes: conjunctivae/corneas clear, PERRL, EOM's intact  Lungs: clear to auscultation bilaterally, no dullness to percussion bilaterally  Heart: regular rate and rhythm without rub; no displacement of the PMI   Abdomen: soft, non-tender; bowel sounds normoactive; no organomegaly  Extremities: extremities symmetric; no clubbing, cyanosis, + trace pretibial edema bilateral LE, scant leg hair   Integument: Skin color, texture, turgor normal; no rashes; hair distrubution normal  Neurologic: Alert and oriented X 3, normal strength, normal coordination and gait  Psychiatric: no pressured speech; normal affect; no evidence of impaired cognition     Laboratory  Lab Results   Component Value Date    WBC 7.92 08/21/2023    HGB 13.1 (L) 08/21/2023    HCT 39.9 (L) 08/21/2023    MCV 88 08/21/2023     (L) 08/21/2023     BMP  Lab Results   Component Value Date     08/21/2023    K 4.0 08/21/2023     08/21/2023    CO2 24 08/21/2023    BUN 17 08/21/2023    CREATININE 1.2 08/21/2023    CALCIUM 8.9 08/21/2023    ANIONGAP 8 08/21/2023    EGFRNORACEVR >60.0 08/21/2023     Lab Results   Component Value Date    ALT 19 08/21/2023    AST 17 08/21/2023     (H) 04/24/2017    ALKPHOS 61 08/21/2023    BILITOT 0.6 08/21/2023     Lab Results   Component Value Date    INR 1.0 08/02/2023    INR 1.0 08/02/2023    INR 1.0 04/24/2023     Lab Results   Component Value Date    HGBA1C 6.8 (H) 05/13/2023       Diagnostic Results:    2 D echo 8/3/23:    Left Ventricle: The left ventricle is normal in size. Normal wall thickness. Normal wall motion. There is normal systolic function with a visually estimated ejection fraction of 55 - 60%. Grade I diastolic dysfunction.    Left Atrium: Left atrium is mildly dilated.    Right Ventricle: Normal right ventricular cavity size. Systolic function is normal.     Right Atrium: Right atrium is mildly dilated.    Aortic Valve: The aortic valve is a trileaflet valve. There is moderate aortic valve sclerosis.    Mitral Valve: There is no stenosis. The mean pressure gradient across the mitral valve is 1 mmHg at a heart rate of  bpm. There is trace regurgitation.    Tricuspid Valve: There is trace regurgitation.    Pulmonic Valve: There is no significant stenosis. The estimated PA systolic pressure is 20 mmHg.    IVC/SVC: Normal venous pressure at 3 mmHg.    Cardiac Catheterization 8/2/23:                Long and patent LM                60% mid LAD stenosis                LCX with luminal irregularities                80% ostial OM1 stenosis                40% mid RCA stenosis                 Tortuous vessels especially LCX                LVEDP 10 mmHg        Plan                 Maximize medical therapy for CAD                Intense statin therapy                Echo to assess EF                Aggressive risk factors modification                 Consider PET stress to ischemic burden of OM1 stenosis       TRANSITION OF CARE:     [facility] Contact Note: 8/4/23     Family and/or Caretaker present at visit?  No.  Diagnostic tests reviewed/disposition: No diagnosic tests pending after this hospitalization.  Disease/illness education: Yes  Home health/community services discussion/referrals: Patient does not have home health established from hospital visit.  They do not need home health.  If needed, we will set up home health for the patient.   Establishment or re-establishment of referral orders for community resources: No other necessary community resources.   Discussion with other health care providers: No discussion with other health care providers necessary.     ASSESSMENT & PLAN:       NSTEMI (non-ST elevated myocardial infarction)  Coronary artery disease involving native coronary artery of native heart with angina pectoris  - recent hospitalization as above  - [facility]  with non obstructive CAD  - medical management maximized  - recommendation for outpatient PET stress- defer to Cardiology team if appropriate   - recommendation for outpatient cardiac rehab- unclear if appropriate for patient at this time due to his mobility issues related to cervical stenosis and he is pending surgical cervical fusion   - see Cardiology team 9/26/23    S/P liver transplant  - Liver transplant 2009 - followed by [facility] team  - on Tacrolimus     Patient will be released from hospital follow up clinic.  He will see his PCP, Dr Lopez, as directed.     Instructions for the patient:      Scheduled Follow-up :  Future Appointments   Date Time Provider Department Center   8/29/2023  3:15 PM [facility] ODC XR-B LIMIT 500 LBS [facility] XRAY OP [facility]   9/5/2023 10:15 AM [facility] OIC-US1 MASTER [facility] ULTR [facility]   9/12/2023  8:30 AM Paige Gilbert PA-C [facility] NEUROSU [facility]   9/15/2023 11:15 AM Jacques Haro MD [facility] UROLOGY [facility]   9/18/2023  9:00 AM Roberto Jolley DNP [facility] PREADMT [facility]   9/26/2023 11:00 AM Deepthi Da Silva PA-C [facility] CARDIO [place]   9/26/2023  3:15 PM [facility] ODC XR-B LIMIT 500 LBS [facility] XRAY OP [facility]   9/26/2023  4:30 PM Paige Gilbert PA-C [facility] NEUROSU [facility]   12/5/2023  2:15 PM [facility] ODC XR-A LIMIT 350 LBS [facility] XRAY [facility]   12/5/2023  3:40 PM Titi Christian MD [facility] NEUROSU [facility]       Post Visit Medication List:     Medication List            Accurate as of August 24, 2023  3:12 PM. If you have any questions, ask your nurse or doctor.                CHANGE how you take these medications      allopurinoL 100 MG tablet  Commonly known as: ZYLOPRIM  TAKE 1 TABLET BY MOUTH TWICE A DAY  What changed: when to take this     aluminum-magnesium hydroxide-simethicone 200-200-20 mg/5 mL Susp  Commonly known as: MAALOX  Take 30 mLs by mouth 4 (four) times daily before meals and nightly.  What changed:   when to take  this  reasons to take this     TOUJEO MAX U-300 SOLOSTAR 300 unit/mL (3 mL) insulin pen  Generic drug: insulin glargine U-300 conc  Inject 40 Units into the skin once daily.  What changed: when to take this            CONTINUE taking these medications      albuterol 90 mcg/actuation inhaler  Commonly known as: PROAIR HFA  Inhale 2 puffs into the lungs every 6 (six) hours as needed for Wheezing. Rescue     aspirin 81 MG Chew  Take 1 tablet (81 mg total) by mouth once daily.     atorvastatin 40 MG tablet  Commonly known as: LIPITOR  Take 1 tablet (40 mg total) by mouth once daily.     blood sugar diagnostic Strp  Commonly known as: TRUE METRIX GLUCOSE TEST STRIP  USE 3 TIMES DAILY TO TEST BLOOD GLUCOSE LEVEL     blood-glucose meter Misc  1 Device by Misc.(Non-Drug; Combo Route) route 3 (three) times daily.     calcium carbonate-vitamin D3 600 mg-10 mcg (400 unit) Chew  Commonly known as: CALCIUM 600 WITH VITAMIN D3     clopidogreL 75 mg tablet  Commonly known as: PLAVIX  Take 1 tablet (75 mg total) by mouth once daily.     cyanocobalamin 100 MCG tablet  Commonly known as: VITAMIN B-12     DEXCOM G6 TRANSMITTER Viviane  Generic drug: blood-glucose transmitter  Use as directed     DEXCOM G7  Misc  Generic drug: blood-glucose meter,continuous  Use as directed.     DEXCOM G7 SENSOR Viviane  Generic drug: blood-glucose sensor  Use as directed.     diphenhydrAMINE 25 mg capsule  Commonly known as: BENADRYL     ENTRESTO 49-51 mg per tablet  Generic drug: sacubitriL-valsartan  Take 1 tablet by mouth 2 (two) times daily.     furosemide 20 MG tablet  Commonly known as: LASIX  Take 2 tablets (40 mg total) by mouth daily as needed (For Weight Gain > 2-3 lbs in 1 day or 4-6 lbs over 1 week notify PCP and take 40 mg daily for three days).     gabapentin 800 MG tablet  Commonly known as: NEURONTIN  Take 1 tablet (800 mg total) by mouth 3 (three) times daily.     insulin lispro 100 unit/mL pen  Commonly known as: HumaLOG KwikPen  "Insulin  Inject 20 Units into the skin 3 (three) times daily with meals.     isosorbide-hydrALAZINE 20-37.5 mg 20-37.5 mg Tab  Commonly known as: BIDIL  Take 1 tablet by mouth 3 (three) times daily.     lancets 30 gauge Misc  1 lancet by Misc.(Non-Drug; Combo Route) route 4 (four) times daily before meals and nightly.     levothyroxine 88 MCG tablet  Commonly known as: SYNTHROID  TAKE 1 TABLET BY MOUTH EVERY DAY     MEN'S MULTIVITAMIN GUMMIES 200 mcg Chew  Generic drug: multivit with min-folic acid     metoprolol succinate 200 MG 24 hr tablet  Commonly known as: TOPROL-XL  TAKE 1 TABLET BY MOUTH EVERY DAY     NOVOFINE PLUS 32 gauge x 1/6" Ndle  Generic drug: pen needle, diabetic     tacrolimus 1 MG Cap  Commonly known as: PROGRAF  Take 2 capsules (2 mg total) by mouth every morning AND 1 capsule (1 mg total) every evening.     traZODone 50 MG tablet  Commonly known as: DESYREL  TAKE 1 TABLET BY MOUTH EVERY DAY IN THE EVENING              Signing Physician:  Taryn Corcoran MD    "

## 2023-08-28 ENCOUNTER — TELEPHONE (OUTPATIENT)
Dept: TRANSPLANT | Facility: CLINIC | Age: 68
End: 2023-08-28
Payer: MEDICARE

## 2023-08-28 DIAGNOSIS — Z85.05 PERSONAL HISTORY OF MALIGNANT NEOPLASM OF LIVER: ICD-10-CM

## 2023-08-28 DIAGNOSIS — K74.60 HEPATIC CIRRHOSIS, UNSPECIFIED HEPATIC CIRRHOSIS TYPE, UNSPECIFIED WHETHER ASCITES PRESENT: ICD-10-CM

## 2023-08-28 DIAGNOSIS — Z94.4 S/P LIVER TRANSPLANT: Primary | ICD-10-CM

## 2023-08-28 NOTE — LETTER
August 28, 2023    Vick Gonzalez  [address] [facility] [address]          Dear Vick Gonzalez:  MRN: [id number]    This is a follow up to your recent labs, your lab results were stable.  There are no medicine changes.  Please have your labs drawn again on 11/27/23.      If you cannot have your labs drawn on the scheduled date, it is your responsibility to call the transplant department to reschedule.  Please call [phone] and ask to speak to [facility]  for all scheduling requests.     Sincerely,      Kandy  Your Liver Transplant Coordinator    [facility]  [address] [place] [id number]  [phone]

## 2023-08-28 NOTE — TELEPHONE ENCOUNTER
Letter sent, labs stable and no medication changes are needed. Repeat labs due 11/27/23 per protocol.  ----- Message from James Coleman MD sent at 8/28/2023  9:48 AM CDT -----  Results reviewed

## 2023-08-29 ENCOUNTER — HOSPITAL ENCOUNTER (OUTPATIENT)
Dept: RADIOLOGY | Facility: HOSPITAL | Age: 68
Discharge: HOME | End: 2023-08-29
Attending: NEUROLOGICAL SURGERY
Payer: MEDICARE

## 2023-08-29 DIAGNOSIS — M48.02 CERVICAL SPINAL STENOSIS: ICD-10-CM

## 2023-08-29 DIAGNOSIS — G95.9 CERVICAL MYELOPATHY: ICD-10-CM

## 2023-08-29 DIAGNOSIS — M54.12 CERVICAL RADICULOPATHY: ICD-10-CM

## 2023-08-29 PROCEDURE — 72040 X-RAY EXAM NECK SPINE 2-3 VW: CPT | Mod: TC,FY

## 2023-08-29 PROCEDURE — 72040 X-RAY EXAM NECK SPINE 2-3 VW: CPT | Mod: 26,,, | Performed by: INTERNAL MEDICINE

## 2023-08-29 PROCEDURE — 72040 XR CERVICAL SPINE AP LATERAL: ICD-10-PCS | Mod: 26,,, | Performed by: INTERNAL MEDICINE

## 2023-09-05 ENCOUNTER — HOSPITAL ENCOUNTER (OUTPATIENT)
Dept: RADIOLOGY | Facility: HOSPITAL | Age: 68
Discharge: HOME | End: 2023-09-05
Attending: INTERNAL MEDICINE
Payer: MEDICARE

## 2023-09-05 DIAGNOSIS — Z94.4 S/P LIVER TRANSPLANT: ICD-10-CM

## 2023-09-05 PROCEDURE — 76705 US DOPPLER LIVER TRANSPLANT POST (XPD): ICD-10-PCS | Mod: 26,59,, | Performed by: RADIOLOGY

## 2023-09-05 PROCEDURE — 93976 VASCULAR STUDY: CPT | Mod: TC

## 2023-09-05 PROCEDURE — 93976 VASCULAR STUDY: CPT | Mod: 26,,, | Performed by: RADIOLOGY

## 2023-09-05 PROCEDURE — 93976 US DOPPLER LIVER TRANSPLANT POST (XPD): ICD-10-PCS | Mod: 26,,, | Performed by: RADIOLOGY

## 2023-09-05 PROCEDURE — 76705 ECHO EXAM OF ABDOMEN: CPT | Mod: 26,59,, | Performed by: RADIOLOGY

## 2023-09-08 ENCOUNTER — TELEPHONE (OUTPATIENT)
Dept: TRANSPLANT | Facility: CLINIC | Age: 68
End: 2023-09-08
Payer: MEDICARE

## 2023-09-08 DIAGNOSIS — Z94.4 S/P LIVER TRANSPLANT: Primary | ICD-10-CM

## 2023-09-08 NOTE — LETTER
September 8, 2023    Vick Gonzalez  [address] [facility] [address]          Dear Vick Gonzalez,  MRN: [id number]    Mr. Gonzalez,    Dr. Coleman reviewed your liver transplant ultrasound. He said it is stable. The next liver   transplant ultrasound will be due in March of 2024.      If you have any questions or concerns, please don't hesitate to call.    Sincerely,    Kadi [facility]  [facility] [place] [address]  [phone]

## 2023-09-08 NOTE — TELEPHONE ENCOUNTER
Letter sent, ultrasound stable  ----- Message from James Coleman MD sent at 9/8/2023 10:45 AM CDT -----  Results reviewed

## 2023-09-12 ENCOUNTER — TELEPHONE (OUTPATIENT)
Dept: PREADMISSION TESTING | Facility: HOSPITAL | Age: 68
End: 2023-09-12
Payer: MEDICARE

## 2023-09-15 ENCOUNTER — OFFICE VISIT (OUTPATIENT)
Dept: UROLOGY | Facility: CLINIC | Age: 68
End: 2023-09-15
Payer: MEDICARE

## 2023-09-15 ENCOUNTER — TELEPHONE (OUTPATIENT)
Dept: UROLOGY | Facility: CLINIC | Age: 68
End: 2023-09-15

## 2023-09-15 VITALS
SYSTOLIC BLOOD PRESSURE: 161 MMHG | WEIGHT: 267.88 LBS | BODY MASS INDEX: 34.38 KG/M2 | HEIGHT: 74 IN | DIASTOLIC BLOOD PRESSURE: 85 MMHG | HEART RATE: 88 BPM

## 2023-09-15 DIAGNOSIS — N52.9 ERECTILE DYSFUNCTION, UNSPECIFIED ERECTILE DYSFUNCTION TYPE: ICD-10-CM

## 2023-09-15 PROCEDURE — 99204 PR OFFICE/OUTPT VISIT, NEW, LEVL IV, 45-59 MIN: ICD-10-PCS | Mod: S$GLB,,, | Performed by: UROLOGY

## 2023-09-15 PROCEDURE — 3288F PR FALLS RISK ASSESSMENT DOCUMENTED: ICD-10-PCS | Mod: CPTII,S$GLB,, | Performed by: UROLOGY

## 2023-09-15 PROCEDURE — 3077F PR MOST RECENT SYSTOLIC BLOOD PRESSURE >= 140 MM HG: ICD-10-PCS | Mod: CPTII,S$GLB,, | Performed by: UROLOGY

## 2023-09-15 PROCEDURE — 3077F SYST BP >= 140 MM HG: CPT | Mod: CPTII,S$GLB,, | Performed by: UROLOGY

## 2023-09-15 PROCEDURE — 1101F PT FALLS ASSESS-DOCD LE1/YR: CPT | Mod: CPTII,S$GLB,, | Performed by: UROLOGY

## 2023-09-15 PROCEDURE — 99204 OFFICE O/P NEW MOD 45 MIN: CPT | Mod: S$GLB,,, | Performed by: UROLOGY

## 2023-09-15 PROCEDURE — 4010F ACE/ARB THERAPY RXD/TAKEN: CPT | Mod: CPTII,S$GLB,, | Performed by: UROLOGY

## 2023-09-15 PROCEDURE — 1159F PR MEDICATION LIST DOCUMENTED IN MEDICAL RECORD: ICD-10-PCS | Mod: CPTII,S$GLB,, | Performed by: UROLOGY

## 2023-09-15 PROCEDURE — 3008F BODY MASS INDEX DOCD: CPT | Mod: CPTII,S$GLB,, | Performed by: UROLOGY

## 2023-09-15 PROCEDURE — 99999 PR PBB SHADOW E&M-EST. PATIENT-LVL III: CPT | Mod: PBBFAC,,, | Performed by: UROLOGY

## 2023-09-15 PROCEDURE — 3008F PR BODY MASS INDEX (BMI) DOCUMENTED: ICD-10-PCS | Mod: CPTII,S$GLB,, | Performed by: UROLOGY

## 2023-09-15 PROCEDURE — 1159F MED LIST DOCD IN RCRD: CPT | Mod: CPTII,S$GLB,, | Performed by: UROLOGY

## 2023-09-15 PROCEDURE — 1125F AMNT PAIN NOTED PAIN PRSNT: CPT | Mod: CPTII,S$GLB,, | Performed by: UROLOGY

## 2023-09-15 PROCEDURE — 1125F PR PAIN SEVERITY QUANTIFIED, PAIN PRESENT: ICD-10-PCS | Mod: CPTII,S$GLB,, | Performed by: UROLOGY

## 2023-09-15 PROCEDURE — 1101F PR PT FALLS ASSESS DOC 0-1 FALLS W/OUT INJ PAST YR: ICD-10-PCS | Mod: CPTII,S$GLB,, | Performed by: UROLOGY

## 2023-09-15 PROCEDURE — 3044F PR MOST RECENT HEMOGLOBIN A1C LEVEL <7.0%: ICD-10-PCS | Mod: CPTII,S$GLB,, | Performed by: UROLOGY

## 2023-09-15 PROCEDURE — 4010F PR ACE/ARB THEARPY RXD/TAKEN: ICD-10-PCS | Mod: CPTII,S$GLB,, | Performed by: UROLOGY

## 2023-09-15 PROCEDURE — 99999 PR PBB SHADOW E&M-EST. PATIENT-LVL III: ICD-10-PCS | Mod: PBBFAC,,, | Performed by: UROLOGY

## 2023-09-15 PROCEDURE — 3288F FALL RISK ASSESSMENT DOCD: CPT | Mod: CPTII,S$GLB,, | Performed by: UROLOGY

## 2023-09-15 PROCEDURE — 3079F DIAST BP 80-89 MM HG: CPT | Mod: CPTII,S$GLB,, | Performed by: UROLOGY

## 2023-09-15 PROCEDURE — 3079F PR MOST RECENT DIASTOLIC BLOOD PRESSURE 80-89 MM HG: ICD-10-PCS | Mod: CPTII,S$GLB,, | Performed by: UROLOGY

## 2023-09-15 PROCEDURE — 3044F HG A1C LEVEL LT 7.0%: CPT | Mod: CPTII,S$GLB,, | Performed by: UROLOGY

## 2023-09-15 RX ORDER — PAPAVERINE HYDROCHLORIDE 30 MG/ML
INJECTION INTRAMUSCULAR; INTRAVENOUS
Qty: 10 ML | Refills: 5 | Status: SHIPPED | OUTPATIENT
Start: 2023-09-15

## 2023-09-15 NOTE — TELEPHONE ENCOUNTER
Spoke with patient, he is requesting Viagra temporarily until he can afford Trimix medication.  Currently on Bidil.  Informed Dr Haro is currently out of office and will return on Monday.  Please advise.

## 2023-09-15 NOTE — PROGRESS NOTES
[facility] - Urology   Clinic Note    SUBJECTIVE:     Chief Complaint   Patient presents with    Erectile Dysfunction       Referral from: Emanuel Lopez MD.    History of Present Illness:  Vick Gonzalez is a 67 y.o. male who presents to clinic for erectile dysfunction.    Patient here with complaints of erectile dysfunction.  He endorses difficulty getting and maintaining erections.  He is not able to reliably achieve a strong enough erection for intercourse.  Patient does get erections with TriMix however he reports that he is tired of sticking himself.  His last A1c was 7.8.  By before that it was greater than 9.  He is a liver transplant patient and is currently on clopidogrel    TROY: 5/25    Previous ED treatment: yes, trimix, cialis, trimix works well.    Patient endorses no additional complaints at this time.    Past Medical History:   Diagnosis Date    Acute congestive heart failure 5/12/2023    Anemia     Anxiety     Cataract     Chronic pain syndrome 07/13/2011    CKD (chronic kidney disease), stage III     Coronary artery disease involving native coronary artery of native heart with angina pectoris 8/24/2023    Diabetes mellitus type II, uncontrolled     Discitis of lumbosacral region 01/16/2015    ED (erectile dysfunction)     Encounter for blood transfusion     Genital herpes     Gout, arthritis     History of alcohol abuse     History of hepatitis C, s/p successful Rx w/ SVR24 (cure) - 5/2018 08/23/2011    Completed 24wks Epclusa + RBV w/SVR12 - 2/2018  -     History of positive PPD, treatment status unknown     Pulmonary granulomas, negative sputum cultures for AFB and indeterminate quantferon test    History of substance abuse     Hypertension     Hypothyroidism     Liver replaced by transplant 08/23/2011    DATE: 12/16/2013  LIVER BIOPSY : REASON:  hep C staging  PATHOLOGY COMMITTEE NOTE/PLAN: :grade  1 / stage 1        Pancreatitis 2016    Peptic ulcer disease     Pulmonary emphysema,  unspecified emphysema type 5/26/2023       Past Surgical History:   Procedure Laterality Date    ANTERIOR CERVICAL DISCECTOMY W/ FUSION N/A 8/22/2022    Procedure: Procedure: ACDF 4-7 LOS: 4.0 Anesthesia: General Blood: Type & Screen Radiology: C-Arm Microscope: ------- SNS: EMG, SEP, MEP Brace: [place] Bed: Regular Bed, Shoulder Strap Headrest:------ Position: Supine Equipment: Depuy;  Surgeon: Titi Christian MD;  Location: [facility] OR;  Service: Neurosurgery;  Laterality: N/A;  Depuy  confirmed CW 8/19  Neuro monitoring confirmed CW 8/19    CARPAL TUNNEL RELEASE Left 10/29/2021    Procedure: RELEASE, CARPAL TUNNEL;  Surgeon: Jameson Alejo Jr., MD;  Location: [facility] OR;  Service: Orthopedics;  Laterality: Left;    CHOLECYSTECTOMY      CORONARY ANGIOGRAPHY N/A 8/2/2023    Procedure: ANGIOGRAM, CORONARY ARTERY;  Surgeon: Juan Vera MD;  Location: [facility] CATH LAB/EP;  Service: Cardiology;  Laterality: N/A;    DECOMPRESSION OF CERVICAL SPINE BY ANTERIOR APPROACH WITH FUSION  8/22/2022    Procedure: DECOMPRESSION AND FUSION, SPINE, CERVICAL, ANTERIOR APPROACH;  Surgeon: Titi Christian MD;  Location: [facility] OR;  Service: Neurosurgery;;    INJECTION OF JOINT Right 12/2/2019    Procedure: INJECTION, JOINT SI;  Surgeon: Thu Penn MD;  Location: [facility] PAIN MGT;  Service: Pain Management;  Laterality: Right;  RT SI JNT INJ    LEFT HEART CATHETERIZATION Left 8/2/2023    Procedure: Left heart cath;  Surgeon: Juan Vera MD;  Location: [facility] CATH LAB/EP;  Service: Cardiology;  Laterality: Left;    LIVER TRANSPLANT  06/2010    SPINE SURGERY      TRANSFORAMINAL EPIDURAL INJECTION OF STEROID Left 5/29/2023    Procedure: INJECTION, STEROID, EPIDURAL, TRANSFORAMINAL APPROACH,  LEFT C7-T1 DIRECT REF;  Surgeon: Thu Penn MD;  Location: [facility] PAIN MGT;  Service: Pain Management;  Laterality: Left;  4/3 MD ILL/PT WILL C/B       Family History   Problem Relation Age of Onset    Cancer Mother     Diabetes Mother     Heart disease  Mother     Diabetes Sister     Cancer Maternal Uncle 82        colon CA    Drug abuse Daughter     Melanoma Neg Hx     Psoriasis Neg Hx     Lupus Neg Hx     Eczema Neg Hx     Acne Neg Hx        Social History     Tobacco Use    Smoking status: Former     Passive exposure: Never    Smokeless tobacco: Never   Substance Use Topics    Alcohol use: No     Comment: over 5 years ago, none currently    Drug use: Not Currently     Comment: Former cocaine use       Current Outpatient Medications on File Prior to Visit   Medication Sig Dispense Refill    albuterol (PROAIR HFA) 90 mcg/actuation inhaler Inhale 2 puffs into the lungs every 6 (six) hours as needed for Wheezing. Rescue 18 g 5    allopurinoL (ZYLOPRIM) 100 MG tablet TAKE 1 TABLET BY MOUTH TWICE A DAY (Patient taking differently: Take 100 mg by mouth once daily.) 180 tablet 3    aluminum-magnesium hydroxide-simethicone (MAALOX) 200-200-20 mg/5 mL Susp Take 30 mLs by mouth 4 (four) times daily before meals and nightly. (Patient taking differently: Take 30 mLs by mouth every 6 (six) hours as needed.) 769 mL 0    aspirin 81 MG Chew Take 1 tablet (81 mg total) by mouth once daily. 90 tablet 3    atorvastatin (LIPITOR) 40 MG tablet Take 1 tablet (40 mg total) by mouth once daily. 90 tablet 3    blood sugar diagnostic (TRUE METRIX GLUCOSE TEST STRIP) Strp USE 3 TIMES DAILY TO TEST BLOOD GLUCOSE LEVEL 100 strip 11    blood-glucose meter,continuous (DEXCOM G7 ) Misc Use as directed. 1 each 11    blood-glucose sensor (DEXCOM G7 SENSOR) Viviane Use as directed. 3 each 11    blood-glucose transmitter (DEXCOM G6 TRANSMITTER) Viviane Use as directed 1 each 11    calcium carbonate-vitamin D3 (CALCIUM 600 WITH VITAMIN D3) 600 mg(1,500mg) -400 unit Chew Take 1 tablet by mouth once daily.       clopidogreL (PLAVIX) 75 mg tablet Take 1 tablet (75 mg total) by mouth once daily. 30 tablet 11    cyanocobalamin (VITAMIN B-12) 100 MCG tablet Take 100 mcg by mouth once daily.       "diphenhydrAMINE (BENADRYL) 25 mg capsule Take 25 mg by mouth daily as needed for Allergies (Cold).      gabapentin (NEURONTIN) 800 MG tablet Take 1 tablet (800 mg total) by mouth 3 (three) times daily. 90 tablet 11    insulin glargine U-300 conc (TOUJEO MAX U-300 SOLOSTAR) 300 unit/mL (3 mL) insulin pen Inject 40 Units into the skin once daily. (Patient taking differently: Inject 40 Units into the skin every evening.) 3 mL 11    insulin lispro (HUMALOG KWIKPEN INSULIN) 100 unit/mL pen Inject 20 Units into the skin 3 (three) times daily with meals. 15 mL 11    isosorbide-hydrALAZINE 20-37.5 mg (BIDIL) 20-37.5 mg Tab Take 1 tablet by mouth 3 (three) times daily. 90 tablet 11    lancets 30 gauge Misc 1 lancet by Misc.(Non-Drug; Combo Route) route 4 (four) times daily before meals and nightly. 200 each 11    levothyroxine (SYNTHROID) 88 MCG tablet TAKE 1 TABLET BY MOUTH EVERY DAY 90 tablet 1    metoprolol succinate (TOPROL-XL) 200 MG 24 hr tablet TAKE 1 TABLET BY MOUTH EVERY DAY 90 tablet 3    multivit with min-folic acid (MEN'S MULTIVITAMIN GUMMIES) 200 mcg Chew Take 1 tablet by mouth once daily.      NOVOFINE PLUS 32 gauge x 1/6" Ndle       sacubitriL-valsartan (ENTRESTO) 49-51 mg per tablet Take 1 tablet by mouth 2 (two) times daily. 60 tablet 3    tacrolimus (PROGRAF) 1 MG Cap Take 2 capsules (2 mg total) by mouth every morning AND 1 capsule (1 mg total) every evening. 90 capsule 11    traZODone (DESYREL) 50 MG tablet TAKE 1 TABLET BY MOUTH EVERY DAY IN THE EVENING 90 tablet 3    blood-glucose meter Misc 1 Device by Misc.(Non-Drug; Combo Route) route 3 (three) times daily. 1 each 0    furosemide (LASIX) 20 MG tablet Take 2 tablets (40 mg total) by mouth daily as needed (For Weight Gain > 2-3 lbs in 1 day or 4-6 lbs over 1 week notify PCP and take 40 mg daily for three days). 60 tablet 0    [DISCONTINUED] insulin aspart U-100 (NOVOLOG FLEXPEN U-100 INSULIN) 100 unit/mL (3 mL) InPn pen Inject 20 Units into the skin 3 " "(three) times daily with meals. 15 mL 11     No current facility-administered medications on file prior to visit.       Review of patient's allergies indicates:  No Known Allergies    Review of Systems:  A review of 10+ systems was conducted with pertinent positive and negative findings documented in HPI with all other systems reviewed and negative.    OBJECTIVE:     Estimated body mass index is 34.39 kg/m² as calculated from the following:    Height as of this encounter: 6' 2" (1.88 m).    Weight as of this encounter: 121.5 kg (267 lb 13.7 oz).    Vital Signs (Most Recent)  Vitals:    09/15/23 1100   BP: (!) 161/85   Pulse: 88       Physical Exam:  GENERAL: patient sitting comfortably  HEENT: normocephalic  NECK: supple, no JVD  PULM: normal chest rise, no increased WOB  HEART: non-diaphoretic  ABDO: soft, nondistended, nontender  BACK: no CVA tenderness bilaterally  SKIN: warm, dry, well perfused  EXT: no bruising or edema  NEURO: grossly normal with no focal deficits  PSYCH: appropriate mood and affect    Genitourinary Exam:  deferred    Lab Results   Component Value Date    BUN 17 08/21/2023    CREATININE 1.2 08/21/2023    WBC 7.92 08/21/2023    HGB 13.1 (L) 08/21/2023    HCT 39.9 (L) 08/21/2023     (L) 08/21/2023    AST 17 08/21/2023    ALT 19 08/21/2023    ALKPHOS 61 08/21/2023    ALBUMIN 3.6 08/21/2023    HGBA1C 6.8 (H) 05/13/2023    INR 1.0 08/02/2023        Imaging:   I have personally reviewed all relevant imaging studies.    No results found. However, due to the size of the patient record, not all encounters were searched. Please check Results Review for a complete set of results.  No results found. However, due to the size of the patient record, not all encounters were searched. Please check Results Review for a complete set of results.  US Doppler Liver Transplant Post (xpd)  Narrative: EXAMINATION:  US DOPPLER LIVER TRANSPLANT POST (XPD)    CLINICAL HISTORY:  Liver transplant " status    TECHNIQUE:  Ultrasound of the transplant liver with Doppler evaluation.  Color and spectral Doppler were performed.    COMPARISON:  Ultrasound Doppler liver transplant post 05/13/2023    FINDINGS:  Patient is status post orthotopic liver transplant 2009.  The liver demonstrates homogeneous echotexture.  No focal hepatic lesions are seen.  No fluid collections.    The common duct is not dilated, measuring 4 mm, previously 7-8 mm.  Mildly dilated intrahepatic ducts are seen.    Color flow and spectral waveform analysis was performed.  The main portal vein, right portal vein, left portal vein, middle hepatic vein, right hepatic vein, left hepatic vein, and IVC are patent with proper directional flow.    Anastomosis site of the main hepatic artery demonstrates a peak systolic velocity 72 cm/sec, previously 121 cm/s.    Main hepatic artery demonstrates resistive index 0.85 with normal waveform, previously 0.72.    Left hepatic artery shows resistive index 0.81 with normal waveform, previously 0.79.    Anterior branch of the right hepatic artery demonstrates resistive index 0.86 with normal waveform, previously 0.77.    Posterior branch of the right hepatic artery demonstrates resistive index 0.76 with normal waveform, previously 0.65.  Impression: Satisfactory Doppler evaluation of the liver allograft.  Interval increase in resistive indices slightly above the upper limit of normal.  No tardus parvus waveforms.    Mild intrahepatic biliary duct dilatation.    Electronically signed by resident: Mendy Plummer  Date:    09/05/2023  Time:    12:08    Electronically signed by: Jeff Granger MD  Date:    09/05/2023  Time:    13:02       PSA:  Lab Results   Component Value Date    PSA 0.22 01/19/2022    PSA 0.18 09/01/2020    PSA 0.30 09/06/2016    PSA 0.80 07/13/2012    PSA 0.53 05/12/2011    PSA 0.16 05/25/2010    PSA 0.09 12/15/2008    PSA 0.1 10/05/2006    PSADIAG 0.28 10/09/2017       Testosterone:  Lab Results    Component Value Date    TOTALTESTOST 168 (L) 09/07/2018    TESTOSTERONE 187 (L) 07/25/2018    TESTOSTERONE 30.0 (L) 07/25/2018        ASSESSMENT     1. Erectile dysfunction, unspecified erectile dysfunction type        PLAN:     Erectile Dysfunction    - We discussed the etiology and management of ED, including organic and psychogenic causes. First line therapy involves treatment with PDE-5 inhibitors.    - We discussed the risks and benefits of treatment with PDE5i's. Treatment with ERIC was also briefly discussed.     - Next line therapies would include intraurethral suppository (125-1000 ug), then intracavernosal injections (1 ml trimix papaverine 30 mg, phentolamine 1 mg, prostaglandin E1 10 ug). Ultimately, a penile prosthesis would be the final option.     - We will initate trimix (Tri-Mix - PGE (alprostadil) 10mcg, papavarine 30mg, phentolamine 1mg; dispense 5mL vial, typical starting is 0.2 mL which is equal to 20 units). RTC for injection teachings.    Jacques Haro MD  Urology  [facility]    Disclaimer: This note has been generated using voice-recognition software. There may be typographical errors that have been missed during proof-reading.

## 2023-09-15 NOTE — TELEPHONE ENCOUNTER
----- Message from Danitza Andrews sent at 9/15/2023 12:24 PM CDT -----  Type:  Pharmacy Calling to Clarify an RX      Pharmacy Name:[facility]   Prescription Name:papaverine 30 mg/mL injection  What do they need to clarify?:pharmacy stated that the pt does not want this medication so the pt is requesting to get Viagra instead   Best Call Back Number:papaverine 30 mg/mL injection  Additional Information: please follow up with the pt

## 2023-09-18 ENCOUNTER — TELEPHONE (OUTPATIENT)
Dept: ANESTHESIOLOGY | Facility: HOSPITAL | Age: 68
End: 2023-09-18
Payer: MEDICARE

## 2023-09-18 RX ORDER — SILDENAFIL 100 MG/1
100 TABLET, FILM COATED ORAL DAILY PRN
Qty: 30 TABLET | Refills: 11 | Status: SHIPPED | OUTPATIENT
Start: 2023-09-18 | End: 2023-09-18

## 2023-10-10 NOTE — TELEPHONE ENCOUNTER
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 Anemia level is stable.  Prograf is low  - Continue Epclusa - 1 pill daily - don't miss any doses.  - CONTINUE Ribavirin 800mg daily - FOUR 200mg pills each day (can be all at once or  2 in AM and 2 in PM)  - Need to INCREASE prograf dose. Please confirm that current dose is 1mg in AM and 1mg in PM. Assuming this is correct increase to Prograf 2mg AM and 1mg in PM    Next labs due - pls schedule  -  CMP, prograf - wk 15 - 10/2  
I spoke with patient's wife and message from PA Scheuermann relayed and mailed to patient.  Prograf dose verified.  It was stressed that he should increase dose to 2 mg in am and 1 mg in pm.  Wife states that they will run out of prograf soon.  PA Scheuermann reports already sending a new Rx to pharmacy with increased dose.  Lab scheduled 10/2; reminder notice mailed.  
(4) no impairment

## 2023-10-12 ENCOUNTER — PATIENT MESSAGE (OUTPATIENT)
Dept: RESPIRATORY THERAPY | Facility: HOSPITAL | Age: 68
End: 2023-10-12
Payer: MEDICARE

## 2023-10-23 ENCOUNTER — PATIENT MESSAGE (OUTPATIENT)
Dept: ADMINISTRATIVE | Facility: HOSPITAL | Age: 68
End: 2023-10-23
Payer: MEDICARE

## 2023-10-25 DIAGNOSIS — E11.9 TYPE 2 DIABETES MELLITUS WITHOUT COMPLICATION: ICD-10-CM

## 2023-10-26 ENCOUNTER — PATIENT MESSAGE (OUTPATIENT)
Dept: ADMINISTRATIVE | Facility: HOSPITAL | Age: 68
End: 2023-10-26
Payer: MEDICARE

## 2023-10-29 NOTE — TELEPHONE ENCOUNTER
Care Due:                  Date            Visit Type   Department     Provider  --------------------------------------------------------------------------------                                EP -                              PRIMARY      [facility] FAMILY  Last Visit: 07-      CARE ([facility])   MEDICINE       Emanuel Lopez  Next Visit: None Scheduled  None         None Found                                                            Last  Test          Frequency    Reason                     Performed    Due Date  --------------------------------------------------------------------------------    HBA1C.......  6 months...  insulin..................  05- 11-    Uric Acid...  12 months..  allopurinoL..............  Not Found    Overdue    [employer] Embedded Care Due Messages. Reference number: [id number].   10/28/2023 7:46:13 PM CDT

## 2023-10-30 RX ORDER — ALLOPURINOL 100 MG/1
TABLET ORAL
Qty: 180 TABLET | Refills: 3 | Status: SHIPPED | OUTPATIENT
Start: 2023-10-30

## 2023-11-06 PROBLEM — I21.4 NSTEMI (NON-ST ELEVATED MYOCARDIAL INFARCTION): Status: RESOLVED | Noted: 2023-08-02 | Resolved: 2023-11-06

## 2023-11-25 NOTE — TELEPHONE ENCOUNTER
No care due was identified.  [facility] Embedded Care Due Messages. Reference number: [id number].   11/24/2023 8:03:16 PM CST

## 2023-11-27 ENCOUNTER — LAB VISIT (OUTPATIENT)
Dept: LAB | Facility: HOSPITAL | Age: 68
End: 2023-11-27
Attending: INTERNAL MEDICINE
Payer: MEDICARE

## 2023-11-27 DIAGNOSIS — Z94.4 S/P LIVER TRANSPLANT: ICD-10-CM

## 2023-11-27 DIAGNOSIS — K74.60 HEPATIC CIRRHOSIS, UNSPECIFIED HEPATIC CIRRHOSIS TYPE, UNSPECIFIED WHETHER ASCITES PRESENT: ICD-10-CM

## 2023-11-27 LAB
AFP SERPL-MCNC: 4.6 NG/ML (ref 0–8.4)
ALBUMIN SERPL BCP-MCNC: 3.8 G/DL (ref 3.5–5.2)
ALP SERPL-CCNC: 83 U/L (ref 55–135)
ALT SERPL W/O P-5'-P-CCNC: 14 U/L (ref 10–44)
ANION GAP SERPL CALC-SCNC: 9 MMOL/L (ref 8–16)
AST SERPL-CCNC: 13 U/L (ref 10–40)
BASOPHILS # BLD AUTO: 0.03 K/UL (ref 0–0.2)
BASOPHILS NFR BLD: 0.4 % (ref 0–1.9)
BILIRUB SERPL-MCNC: 0.3 MG/DL (ref 0.1–1)
BUN SERPL-MCNC: 21 MG/DL (ref 8–23)
CALCIUM SERPL-MCNC: 9.2 MG/DL (ref 8.7–10.5)
CHLORIDE SERPL-SCNC: 106 MMOL/L (ref 95–110)
CO2 SERPL-SCNC: 23 MMOL/L (ref 23–29)
CREAT SERPL-MCNC: 1.2 MG/DL (ref 0.5–1.4)
DIFFERENTIAL METHOD: ABNORMAL
EOSINOPHIL # BLD AUTO: 0.5 K/UL (ref 0–0.5)
EOSINOPHIL NFR BLD: 6.1 % (ref 0–8)
ERYTHROCYTE [DISTWIDTH] IN BLOOD BY AUTOMATED COUNT: 13.3 % (ref 11.5–14.5)
EST. GFR  (NO RACE VARIABLE): >60 ML/MIN/1.73 M^2
GLUCOSE SERPL-MCNC: 268 MG/DL (ref 70–110)
HCT VFR BLD AUTO: 38.2 % (ref 40–54)
HGB BLD-MCNC: 12.7 G/DL (ref 14–18)
IMM GRANULOCYTES # BLD AUTO: 0.02 K/UL (ref 0–0.04)
IMM GRANULOCYTES NFR BLD AUTO: 0.3 % (ref 0–0.5)
INR PPP: 1 (ref 0.8–1.2)
LYMPHOCYTES # BLD AUTO: 3.1 K/UL (ref 1–4.8)
LYMPHOCYTES NFR BLD: 38.3 % (ref 18–48)
MCH RBC QN AUTO: 29.4 PG (ref 27–31)
MCHC RBC AUTO-ENTMCNC: 33.2 G/DL (ref 32–36)
MCV RBC AUTO: 88 FL (ref 82–98)
MONOCYTES # BLD AUTO: 0.8 K/UL (ref 0.3–1)
MONOCYTES NFR BLD: 9.6 % (ref 4–15)
NEUTROPHILS # BLD AUTO: 3.6 K/UL (ref 1.8–7.7)
NEUTROPHILS NFR BLD: 45.3 % (ref 38–73)
NRBC BLD-RTO: 0 /100 WBC
PLATELET # BLD AUTO: 143 K/UL (ref 150–450)
PMV BLD AUTO: 14.2 FL (ref 9.2–12.9)
POTASSIUM SERPL-SCNC: 4.3 MMOL/L (ref 3.5–5.1)
PROT SERPL-MCNC: 7.3 G/DL (ref 6–8.4)
PROTHROMBIN TIME: 10.9 SEC (ref 9–12.5)
RBC # BLD AUTO: 4.32 M/UL (ref 4.6–6.2)
SODIUM SERPL-SCNC: 138 MMOL/L (ref 136–145)
WBC # BLD AUTO: 7.98 K/UL (ref 3.9–12.7)

## 2023-11-27 PROCEDURE — 80053 COMPREHEN METABOLIC PANEL: CPT | Performed by: INTERNAL MEDICINE

## 2023-11-27 PROCEDURE — 80197 ASSAY OF TACROLIMUS: CPT | Performed by: INTERNAL MEDICINE

## 2023-11-27 PROCEDURE — 82105 ALPHA-FETOPROTEIN SERUM: CPT | Performed by: INTERNAL MEDICINE

## 2023-11-27 PROCEDURE — 36415 COLL VENOUS BLD VENIPUNCTURE: CPT | Mod: PO | Performed by: INTERNAL MEDICINE

## 2023-11-27 PROCEDURE — 85025 COMPLETE CBC W/AUTO DIFF WBC: CPT | Performed by: INTERNAL MEDICINE

## 2023-11-27 PROCEDURE — 85610 PROTHROMBIN TIME: CPT | Performed by: INTERNAL MEDICINE

## 2023-11-27 RX ORDER — CALCIUM CITRATE/VITAMIN D3 200MG-6.25
TABLET ORAL
Qty: 100 STRIP | Refills: 11 | Status: SHIPPED | OUTPATIENT
Start: 2023-11-27

## 2023-11-28 LAB — TACROLIMUS BLD-MCNC: 3.3 NG/ML (ref 5–15)

## 2023-11-29 ENCOUNTER — TELEPHONE (OUTPATIENT)
Dept: TRANSPLANT | Facility: CLINIC | Age: 68
End: 2023-11-29
Payer: MEDICARE

## 2023-11-29 DIAGNOSIS — E11.9 TYPE 2 DIABETES MELLITUS WITHOUT COMPLICATION: ICD-10-CM

## 2023-11-29 NOTE — TELEPHONE ENCOUNTER
Letter sent, labs stable and no medication changes are needed. Repeat labs due 2/26/24 per protocol.  ----- Message from James Coleman MD sent at 11/29/2023 11:01 AM CST -----  Results reviewed

## 2023-11-29 NOTE — LETTER
November 29, 2023    Vick Gonzalez  [address] [address]          Dear Vick Gonzalez:  MRN: [id number]    This is a follow up to your recent labs, your lab results were stable.  There are no medicine changes.  Please have your labs drawn again on 2/26/24.      If you cannot have your labs drawn on the scheduled date, it is your responsibility to call the transplant department to reschedule.  Please call [phone] and ask to speak to [facility]  for all scheduling requests.     Happy Holidays and Happy New Year to you and your family!    Kandy  Your Liver Transplant Coordinator    [facility]  [address] [place] [id number]  [phone]

## 2023-12-01 ENCOUNTER — HOSPITAL ENCOUNTER (EMERGENCY)
Facility: HOSPITAL | Age: 68
Discharge: HOME | End: 2023-12-01
Attending: STUDENT IN AN ORGANIZED HEALTH CARE EDUCATION/TRAINING PROGRAM
Payer: MEDICARE

## 2023-12-01 VITALS
HEART RATE: 92 BPM | BODY MASS INDEX: 33.51 KG/M2 | SYSTOLIC BLOOD PRESSURE: 148 MMHG | DIASTOLIC BLOOD PRESSURE: 74 MMHG | RESPIRATION RATE: 18 BRPM | WEIGHT: 261 LBS | OXYGEN SATURATION: 99 % | TEMPERATURE: 98 F

## 2023-12-01 DIAGNOSIS — R04.0 LEFT-SIDED EPISTAXIS: Primary | ICD-10-CM

## 2023-12-01 PROCEDURE — 99281 EMR DPT VST MAYX REQ PHY/QHP: CPT

## 2023-12-02 NOTE — ED NOTES
Psych: No acute distress is noted. Pt is calm and cooperative, good eye contact.    HEENT: c/o nosebleed PTA. No bleeding noted now. No congestion, no cough    SKIN: The skin is warm, dry and intact. Patient has normal skin turgor and moist mucus membranes, no rashes or lesions. No Breakdown noted.    MUSCULOSKELETAL:  LROM due to previous weakness. Uses cane    RESPIRATORY: Airway is open and patent, respirations are spontaneous; patient has a normal effort and rate. Pink nailbeds.     NEUROLOGIC:   Follows commands without difficulty. Speech is clear. No neuro deficits observed.

## 2023-12-02 NOTE — ED PROVIDER NOTES
Encounter Date: 12/1/2023       History     Chief Complaint   Patient presents with    Epistaxis     2 episodes of a nose bleed today out of left nostril. States first time occurred when he was bending over fooling with a water heater. Bleeding started again this evening. Has now resolved.     67-year-old male with extensive history as listed below presents today for evaluation of atraumatic epistaxis from the left nostril.  Patient reports 2 episodes nosebleeds lasting less than 5 minutes each earlier today.  Initial episode occurred he was bent over working on a washing machine, 2nd episode was later at home while seated on the couch.  Patient states he put an ice pack on his nose and leaned backwards, bleeding quickly stopped.  This has never happened before.  He denies chest pain, shortness of breath, nausea, vomiting.    The history is provided by the patient and medical records. No  was used.     Review of patient's allergies indicates:  No Known Allergies  Past Medical History:   Diagnosis Date    Acute congestive heart failure 5/12/2023    Anemia     Anxiety     Cataract     Chronic pain syndrome 07/13/2011    CKD (chronic kidney disease), stage III     Coronary artery disease involving native coronary artery of native heart with angina pectoris 8/24/2023    Diabetes mellitus type II, uncontrolled     Discitis of lumbosacral region 01/16/2015    ED (erectile dysfunction)     Encounter for blood transfusion     Genital herpes     Gout, arthritis     History of alcohol abuse     History of hepatitis C, s/p successful Rx w/ SVR24 (cure) - 5/2018 08/23/2011    Completed 24wks Epclusa + RBV w/SVR12 - 2/2018  -     History of positive PPD, treatment status unknown     Pulmonary granulomas, negative sputum cultures for AFB and indeterminate quantferon test    History of substance abuse     Hypertension     Hypothyroidism     Liver replaced by transplant 08/23/2011    DATE:  12/16/2013  LIVER BIOPSY : REASON:  hep C staging  PATHOLOGY COMMITTEE NOTE/PLAN: :grade  1 / stage 1        Pancreatitis 2016    Peptic ulcer disease     Pulmonary emphysema, unspecified emphysema type 5/26/2023     Past Surgical History:   Procedure Laterality Date    ANTERIOR CERVICAL DISCECTOMY W/ FUSION N/A 8/22/2022    Procedure: Procedure: ACDF 4-7 LOS: 4.0 Anesthesia: General Blood: Type & Screen Radiology: C-Arm Microscope: ------- SNS: EMG, SEP, MEP Brace: Puryear Bed: Regular Bed, Shoulder Strap Headrest:------ Position: Supine Equipment: Depuy;  Surgeon: Titi Christian MD;  Location: [facility] OR;  Service: Neurosurgery;  Laterality: N/A;  Depuy  confirmed CW 8/19  Neuro monitoring confirmed CW 8/19    CARPAL TUNNEL RELEASE Left 10/29/2021    Procedure: RELEASE, CARPAL TUNNEL;  Surgeon: Jameson Alejo Jr., MD;  Location: [facility] OR;  Service: Orthopedics;  Laterality: Left;    CHOLECYSTECTOMY      CORONARY ANGIOGRAPHY N/A 8/2/2023    Procedure: ANGIOGRAM, CORONARY ARTERY;  Surgeon: Juan Vera MD;  Location: [facility] CATH LAB/EP;  Service: Cardiology;  Laterality: N/A;    DECOMPRESSION OF CERVICAL SPINE BY ANTERIOR APPROACH WITH FUSION  8/22/2022    Procedure: DECOMPRESSION AND FUSION, SPINE, CERVICAL, ANTERIOR APPROACH;  Surgeon: Titi Christian MD;  Location: [facility] OR;  Service: Neurosurgery;;    INJECTION OF JOINT Right 12/2/2019    Procedure: INJECTION, JOINT SI;  Surgeon: Thu Penn MD;  Location: [facility] PAIN MGT;  Service: Pain Management;  Laterality: Right;  RT SI JNT INJ    LEFT HEART CATHETERIZATION Left 8/2/2023    Procedure: Left heart cath;  Surgeon: Juan Vera MD;  Location: [facility] CATH LAB/EP;  Service: Cardiology;  Laterality: Left;    LIVER TRANSPLANT  06/2010    SPINE SURGERY      TRANSFORAMINAL EPIDURAL INJECTION OF STEROID Left 5/29/2023    Procedure: INJECTION, STEROID, EPIDURAL, TRANSFORAMINAL APPROACH,  LEFT C7-T1 DIRECT REF;  Surgeon: Thu Penn MD;  Location: [facility]  PAIN MGT;  Service: Pain Management;  Laterality: Left;  4/3 MD ILL/PT WILL C/B     Family History   Problem Relation Age of Onset    Cancer Mother     Diabetes Mother     Heart disease Mother     Diabetes Sister     Cancer Maternal Uncle 82        colon CA    Drug abuse Daughter     Melanoma Neg Hx     Psoriasis Neg Hx     Lupus Neg Hx     Eczema Neg Hx     Acne Neg Hx      Social History     Tobacco Use    Smoking status: Former     Passive exposure: Never    Smokeless tobacco: Never   Substance Use Topics    Alcohol use: No     Comment: over 5 years ago, none currently    Drug use: Not Currently     Comment: Former cocaine use     Review of Systems   Constitutional:  Negative for fever.   HENT:  Positive for nosebleeds. Negative for sore throat.    Respiratory:  Negative for shortness of breath.    Cardiovascular:  Negative for chest pain.   Gastrointestinal:  Negative for nausea.   Genitourinary:  Negative for dysuria.   Musculoskeletal:  Negative for back pain.   Skin:  Negative for rash.   Neurological:  Negative for weakness.   Hematological:  Does not bruise/bleed easily.       Physical Exam     Initial Vitals [12/01/23 2013]   BP Pulse Resp Temp SpO2   (!) 148/74 92 18 98.4 °F (36.9 °C) 99 %      MAP       --         Physical Exam    Nursing note and vitals reviewed.  Constitutional: He appears well-developed and well-nourished. He is not diaphoretic. No distress.   HENT:   Head: Normocephalic and atraumatic.   Nose: Nose normal. No nose lacerations, sinus tenderness, nasal deformity, septal deviation or nasal septal hematoma. No epistaxis.  No foreign bodies.   Superficial excoriation noted in the left naris.  No active bleeding or drainage.   Eyes: Conjunctivae are normal.   Neck: Neck supple.   Cardiovascular:  Normal rate, normal heart sounds and intact distal pulses.           Pulmonary/Chest: Breath sounds normal. No respiratory distress. He has no wheezes. He has no rhonchi.    Abdominal: Abdomen is soft. He exhibits no distension. There is no abdominal tenderness.   Musculoskeletal:      Cervical back: Neck supple.     Neurological: He is oriented to person, place, and time. GCS score is 15. GCS eye subscore is 4. GCS verbal subscore is 5. GCS motor subscore is 6.   Skin: Skin is warm and dry. Capillary refill takes less than 2 seconds.   Psychiatric: He has a normal mood and affect. His behavior is normal. Judgment and thought content normal.       ED Course   Procedures  Labs Reviewed - No data to display       Imaging Results    None          Medications - No data to display  Medical Decision Making  67-year-old male with extensive history as listed above presents today for evaluation of epistaxis.On exam he appears to be resting comfortably in no acute distress and non-toxic appearing. VSS.  Nose is atraumatic, no active bleeding.  There is an excoriation noted in the left naris; no active bleeding or drainage.  Exam of oropharynx is unremarkable.       Bleeding now resolved prior to arrival. Due to HPI and physical exam the bleeding appears to be anterior in nature. No other evidence of bleeding stigmata. No septal ulceration. Discussed ice, direct pressure, and other techniques to stop future bleeding. No systemic complaints. Return precautions given, patient understands and agrees with plan. All questions answered.  Instructed to follow up with PCP.                                        Clinical Impression:  Final diagnoses:  [R04.0] Left-sided epistaxis (Primary)          ED Disposition Condition    Discharge Stable          ED Prescriptions    None       Follow-up Information       Follow up With Specialties Details Why Contact Info    Emanuel Lopez MD Family Medicine   [address] [facility] [facility]  [address]  [facility] [address]  [phone]               Marilou Soares PA-C  12/01/23 0132

## 2023-12-02 NOTE — DISCHARGE INSTRUCTIONS
Nosebleed care at home:  1st: sit upright and pinch nose just under where the bony area ends and the cartilage begins. Hold this for 5-10 minutes  2nd: If bleeding has stopped do not irritate nose any further  3rd: If bleeding continues after pinching; clear nasal passage by blowing or wiping with tissue and spray AFRIN into affected nasal passage. Return to holding pressure as described above for five minutes. If bleeding continues, seek further evaluation at PCP, Urgent care, or Emergency Department.    Thank you for coming to our Emergency Department today. It is important to remember that some problems or medical conditions are difficult to diagnose and may not be found or addressed during your Emergency Department visit.  These conditions often start with non-specific symptoms and can only be diagnosed on follow up visits with your primary care physician or specialist when the symptoms continue or change. Please remember that all medical conditions can change, and we cannot predict how you will be feeling tomorrow or the next day. Return to the ER with any questions/concerns, new/concerning symptoms, worsening or failure to improve.       Be sure to follow up with your primary care doctor and review all labs/imaging/tests that were performed during your ER visit with them. It is very common for us to identify non-emergent incidental findings which must be followed up with your primary care physician.  Some labs/imaging/tests may be outside of the normal range, and require non-emergent follow-up and/or further investigation/treatment/procedures/testing to help diagnose/exclude/prevent complications or other potentially serious medical conditions. Some abnormalities may not have been discussed or addressed during your ER visit. Some lab results may not return during your ER visit but can be accessible by downloading the free [facility] or by visiting [email or web address] . It is important for you to  review all labs/imaging/tests which are outside of the normal range with your physician.    An ER visit does not replace a primary care visit, and many screening tests or follow-up tests cannot be ordered by an ER doctor or performed by the ER. Some tests may even require pre-approval.    If you do not have a primary care doctor, you may contact the one listed on your discharge paperwork or you may also call the [facility] Appointment Desk at [phone] , or [facility] at  [phone] to schedule an appointment, or establish care with a primary care doctor or even a specialist and to obtain information about local resources. It is important to your health that you have a primary care doctor.    Please take all medications as directed. We have done our best to select a medication for you that will treat your condition however, all medications may potentially have side-effects and it is impossible to predict which medications may give you side-effects or what those side-effects (if any) those medications may give you.  If you feel that you are having a negative effect or side-effect of any medication you should stop taking those medications immediately and seek medical attention. If you feel that you are having a life-threatening reaction call 911.        Do not drive, swim, climb to height, take a bath, operate heavy machinery, drink alcohol or take potentially sedating medications, sign any legal documents or make any important decisions for 24 hours if you have received any pain medications, sedatives or mood altering drugs during your ER visit or within 24 hours of taking them if they have been prescribed to you.     You can find additional resources for Dentists, hearing aids, durable medical equipment, low cost pharmacies and other resources at [email or web address]

## 2023-12-04 ENCOUNTER — HOSPITAL ENCOUNTER (OUTPATIENT)
Facility: HOSPITAL | Age: 68
Discharge: HOME | End: 2023-12-04
Attending: EMERGENCY MEDICINE | Admitting: EMERGENCY MEDICINE
Payer: MEDICARE

## 2023-12-04 VITALS
TEMPERATURE: 98 F | OXYGEN SATURATION: 99 % | HEIGHT: 74 IN | DIASTOLIC BLOOD PRESSURE: 70 MMHG | RESPIRATION RATE: 18 BRPM | HEART RATE: 73 BPM | WEIGHT: 261 LBS | BODY MASS INDEX: 33.5 KG/M2 | SYSTOLIC BLOOD PRESSURE: 121 MMHG

## 2023-12-04 DIAGNOSIS — H92.13 EAR DISCHARGE OF BOTH EARS: ICD-10-CM

## 2023-12-04 DIAGNOSIS — R04.0 EPISTAXIS: Primary | ICD-10-CM

## 2023-12-04 LAB
ALBUMIN SERPL BCP-MCNC: 3.8 G/DL (ref 3.5–5.2)
ALP SERPL-CCNC: 88 U/L (ref 55–135)
ALT SERPL W/O P-5'-P-CCNC: 18 U/L (ref 10–44)
ANION GAP SERPL CALC-SCNC: 10 MMOL/L (ref 8–16)
APTT PPP: 23.3 SEC (ref 21–32)
AST SERPL-CCNC: 18 U/L (ref 10–40)
BASOPHILS # BLD AUTO: 0.03 K/UL (ref 0–0.2)
BASOPHILS NFR BLD: 0.4 % (ref 0–1.9)
BILIRUB SERPL-MCNC: 0.4 MG/DL (ref 0.1–1)
BUN SERPL-MCNC: 14 MG/DL (ref 8–23)
CALCIUM SERPL-MCNC: 8.7 MG/DL (ref 8.7–10.5)
CHLORIDE SERPL-SCNC: 110 MMOL/L (ref 95–110)
CO2 SERPL-SCNC: 16 MMOL/L (ref 23–29)
CREAT SERPL-MCNC: 1.1 MG/DL (ref 0.5–1.4)
DIFFERENTIAL METHOD: ABNORMAL
EOSINOPHIL # BLD AUTO: 0.4 K/UL (ref 0–0.5)
EOSINOPHIL NFR BLD: 4.2 % (ref 0–8)
ERYTHROCYTE [DISTWIDTH] IN BLOOD BY AUTOMATED COUNT: 13.4 % (ref 11.5–14.5)
EST. GFR  (NO RACE VARIABLE): >60 ML/MIN/1.73 M^2
ESTIMATED AVG GLUCOSE: 197 MG/DL (ref 68–131)
GLUCOSE SERPL-MCNC: 222 MG/DL (ref 70–110)
HBA1C MFR BLD: 8.5 % (ref 4–5.6)
HCT VFR BLD AUTO: 37.3 % (ref 40–54)
HGB BLD-MCNC: 12 G/DL (ref 14–18)
IMM GRANULOCYTES # BLD AUTO: 0.03 K/UL (ref 0–0.04)
IMM GRANULOCYTES NFR BLD AUTO: 0.4 % (ref 0–0.5)
INR PPP: 1 (ref 0.8–1.2)
LYMPHOCYTES # BLD AUTO: 2.6 K/UL (ref 1–4.8)
LYMPHOCYTES NFR BLD: 31.1 % (ref 18–48)
MCH RBC QN AUTO: 29 PG (ref 27–31)
MCHC RBC AUTO-ENTMCNC: 32.2 G/DL (ref 32–36)
MCV RBC AUTO: 90 FL (ref 82–98)
MONOCYTES # BLD AUTO: 0.7 K/UL (ref 0.3–1)
MONOCYTES NFR BLD: 8 % (ref 4–15)
NEUTROPHILS # BLD AUTO: 4.7 K/UL (ref 1.8–7.7)
NEUTROPHILS NFR BLD: 55.9 % (ref 38–73)
NRBC BLD-RTO: 0 /100 WBC
PLATELET # BLD AUTO: 129 K/UL (ref 150–450)
PMV BLD AUTO: 13.4 FL (ref 9.2–12.9)
POCT GLUCOSE: 228 MG/DL (ref 70–110)
POTASSIUM SERPL-SCNC: 5 MMOL/L (ref 3.5–5.1)
PROT SERPL-MCNC: 7.3 G/DL (ref 6–8.4)
PROTHROMBIN TIME: 10.8 SEC (ref 9–12.5)
RBC # BLD AUTO: 4.14 M/UL (ref 4.6–6.2)
SODIUM SERPL-SCNC: 136 MMOL/L (ref 136–145)
WBC # BLD AUTO: 8.38 K/UL (ref 3.9–12.7)

## 2023-12-04 PROCEDURE — 85730 THROMBOPLASTIN TIME PARTIAL: CPT | Performed by: EMERGENCY MEDICINE

## 2023-12-04 PROCEDURE — 25000003 PHARM REV CODE 250: Performed by: NURSE PRACTITIONER

## 2023-12-04 PROCEDURE — 85610 PROTHROMBIN TIME: CPT | Performed by: EMERGENCY MEDICINE

## 2023-12-04 PROCEDURE — 96372 THER/PROPH/DIAG INJ SC/IM: CPT | Mod: 59 | Performed by: NURSE PRACTITIONER

## 2023-12-04 PROCEDURE — 63600175 PHARM REV CODE 636 W HCPCS: Performed by: NURSE PRACTITIONER

## 2023-12-04 PROCEDURE — 30905 CONTROL OF NOSEBLEED: CPT

## 2023-12-04 PROCEDURE — 96374 THER/PROPH/DIAG INJ IV PUSH: CPT | Mod: 59

## 2023-12-04 PROCEDURE — 63600175 PHARM REV CODE 636 W HCPCS: Performed by: EMERGENCY MEDICINE

## 2023-12-04 PROCEDURE — 80053 COMPREHEN METABOLIC PANEL: CPT | Performed by: EMERGENCY MEDICINE

## 2023-12-04 PROCEDURE — 96376 TX/PRO/DX INJ SAME DRUG ADON: CPT

## 2023-12-04 PROCEDURE — 99284 EMERGENCY DEPT VISIT MOD MDM: CPT | Mod: 25

## 2023-12-04 PROCEDURE — 82962 GLUCOSE BLOOD TEST: CPT

## 2023-12-04 PROCEDURE — 96375 TX/PRO/DX INJ NEW DRUG ADDON: CPT | Mod: 59

## 2023-12-04 PROCEDURE — G0378 HOSPITAL OBSERVATION PER HR: HCPCS

## 2023-12-04 PROCEDURE — 85025 COMPLETE CBC W/AUTO DIFF WBC: CPT | Performed by: EMERGENCY MEDICINE

## 2023-12-04 PROCEDURE — 25000003 PHARM REV CODE 250: Performed by: PHYSICIAN ASSISTANT

## 2023-12-04 PROCEDURE — 83036 HEMOGLOBIN GLYCOSYLATED A1C: CPT | Performed by: EMERGENCY MEDICINE

## 2023-12-04 PROCEDURE — 25000003 PHARM REV CODE 250: Performed by: EMERGENCY MEDICINE

## 2023-12-04 RX ORDER — IBUPROFEN 200 MG
24 TABLET ORAL
Status: DISCONTINUED | OUTPATIENT
Start: 2023-12-04 | End: 2023-12-04 | Stop reason: HOSPADM

## 2023-12-04 RX ORDER — TRAZODONE HYDROCHLORIDE 50 MG/1
50 TABLET ORAL NIGHTLY PRN
Status: DISCONTINUED | OUTPATIENT
Start: 2023-12-04 | End: 2023-12-04 | Stop reason: HOSPADM

## 2023-12-04 RX ORDER — SODIUM CHLORIDE 0.9 % (FLUSH) 0.9 %
10 SYRINGE (ML) INJECTION
Status: DISCONTINUED | OUTPATIENT
Start: 2023-12-04 | End: 2023-12-04 | Stop reason: HOSPADM

## 2023-12-04 RX ORDER — GLUCAGON 1 MG
1 KIT INJECTION
Status: DISCONTINUED | OUTPATIENT
Start: 2023-12-04 | End: 2023-12-04 | Stop reason: HOSPADM

## 2023-12-04 RX ORDER — IBUPROFEN 200 MG
16 TABLET ORAL
Status: DISCONTINUED | OUTPATIENT
Start: 2023-12-04 | End: 2023-12-04 | Stop reason: HOSPADM

## 2023-12-04 RX ORDER — ONDANSETRON 2 MG/ML
4 INJECTION INTRAMUSCULAR; INTRAVENOUS
Status: COMPLETED | OUTPATIENT
Start: 2023-12-04 | End: 2023-12-04

## 2023-12-04 RX ORDER — LEVOTHYROXINE SODIUM 88 UG/1
88 TABLET ORAL
Status: DISCONTINUED | OUTPATIENT
Start: 2023-12-05 | End: 2023-12-04 | Stop reason: HOSPADM

## 2023-12-04 RX ORDER — NAPROXEN SODIUM 220 MG/1
81 TABLET, FILM COATED ORAL DAILY
Status: DISCONTINUED | OUTPATIENT
Start: 2023-12-05 | End: 2023-12-04 | Stop reason: HOSPADM

## 2023-12-04 RX ORDER — CLONIDINE HYDROCHLORIDE 0.1 MG/1
0.1 TABLET ORAL
Status: COMPLETED | OUTPATIENT
Start: 2023-12-04 | End: 2023-12-04

## 2023-12-04 RX ORDER — MORPHINE SULFATE 2 MG/ML
2 INJECTION, SOLUTION INTRAMUSCULAR; INTRAVENOUS
Status: COMPLETED | OUTPATIENT
Start: 2023-12-04 | End: 2023-12-04

## 2023-12-04 RX ORDER — OXYMETAZOLINE HCL 0.05 %
2 SPRAY, NON-AEROSOL (ML) NASAL
Status: COMPLETED | OUTPATIENT
Start: 2023-12-04 | End: 2023-12-04

## 2023-12-04 RX ORDER — METOPROLOL SUCCINATE 50 MG/1
200 TABLET, EXTENDED RELEASE ORAL DAILY
Status: DISCONTINUED | OUTPATIENT
Start: 2023-12-04 | End: 2023-12-04 | Stop reason: HOSPADM

## 2023-12-04 RX ORDER — METOCLOPRAMIDE HYDROCHLORIDE 5 MG/ML
10 INJECTION INTRAMUSCULAR; INTRAVENOUS
Status: COMPLETED | OUTPATIENT
Start: 2023-12-04 | End: 2023-12-04

## 2023-12-04 RX ORDER — ISOSORBIDE DINITRATE 20 MG/1
20 TABLET ORAL 3 TIMES DAILY
Status: DISCONTINUED | OUTPATIENT
Start: 2023-12-04 | End: 2023-12-04 | Stop reason: HOSPADM

## 2023-12-04 RX ORDER — TACROLIMUS 1 MG/1
2 CAPSULE ORAL EVERY MORNING
Status: DISCONTINUED | OUTPATIENT
Start: 2023-12-04 | End: 2023-12-04 | Stop reason: HOSPADM

## 2023-12-04 RX ORDER — ALBUTEROL SULFATE 90 UG/1
2 AEROSOL, METERED RESPIRATORY (INHALATION) EVERY 6 HOURS PRN
Status: DISCONTINUED | OUTPATIENT
Start: 2023-12-04 | End: 2023-12-04 | Stop reason: HOSPADM

## 2023-12-04 RX ORDER — CLOPIDOGREL BISULFATE 75 MG/1
75 TABLET ORAL DAILY
Status: DISCONTINUED | OUTPATIENT
Start: 2023-12-04 | End: 2023-12-04

## 2023-12-04 RX ORDER — GABAPENTIN 100 MG/1
800 CAPSULE ORAL 3 TIMES DAILY
Status: DISCONTINUED | OUTPATIENT
Start: 2023-12-04 | End: 2023-12-04 | Stop reason: HOSPADM

## 2023-12-04 RX ORDER — TRAMADOL HYDROCHLORIDE 50 MG/1
50 TABLET ORAL EVERY 6 HOURS PRN
Qty: 6 TABLET | Refills: 0 | Status: SHIPPED | OUTPATIENT
Start: 2023-12-04

## 2023-12-04 RX ORDER — ACETAMINOPHEN 325 MG/1
650 TABLET ORAL
Status: DISPENSED | OUTPATIENT
Start: 2023-12-04 | End: 2023-12-04

## 2023-12-04 RX ORDER — CEPHALEXIN 500 MG/1
500 CAPSULE ORAL
Status: COMPLETED | OUTPATIENT
Start: 2023-12-04 | End: 2023-12-04

## 2023-12-04 RX ORDER — MORPHINE SULFATE 4 MG/ML
4 INJECTION, SOLUTION INTRAMUSCULAR; INTRAVENOUS
Status: COMPLETED | OUTPATIENT
Start: 2023-12-04 | End: 2023-12-04

## 2023-12-04 RX ORDER — CLOPIDOGREL BISULFATE 75 MG/1
75 TABLET ORAL DAILY
Status: DISCONTINUED | OUTPATIENT
Start: 2023-12-05 | End: 2023-12-04 | Stop reason: HOSPADM

## 2023-12-04 RX ORDER — INSULIN ASPART 100 [IU]/ML
0-10 INJECTION, SOLUTION INTRAVENOUS; SUBCUTANEOUS
Status: DISCONTINUED | OUTPATIENT
Start: 2023-12-04 | End: 2023-12-04 | Stop reason: HOSPADM

## 2023-12-04 RX ORDER — OXYMETAZOLINE HCL 0.05 %
1 SPRAY, NON-AEROSOL (ML) NASAL 2 TIMES DAILY
Qty: 15 ML | Refills: 0 | Status: SHIPPED | OUTPATIENT
Start: 2023-12-04 | End: 2023-12-07

## 2023-12-04 RX ORDER — ALLOPURINOL 100 MG/1
100 TABLET ORAL 2 TIMES DAILY
Status: DISCONTINUED | OUTPATIENT
Start: 2023-12-04 | End: 2023-12-04 | Stop reason: HOSPADM

## 2023-12-04 RX ORDER — TACROLIMUS 1 MG/1
1 CAPSULE ORAL EVERY EVENING
Status: DISCONTINUED | OUTPATIENT
Start: 2023-12-04 | End: 2023-12-04 | Stop reason: HOSPADM

## 2023-12-04 RX ORDER — ATORVASTATIN CALCIUM 40 MG/1
40 TABLET, FILM COATED ORAL DAILY
Status: DISCONTINUED | OUTPATIENT
Start: 2023-12-04 | End: 2023-12-04 | Stop reason: HOSPADM

## 2023-12-04 RX ORDER — OXYCODONE HYDROCHLORIDE 5 MG/1
5 TABLET ORAL ONCE
Status: COMPLETED | OUTPATIENT
Start: 2023-12-04 | End: 2023-12-04

## 2023-12-04 RX ORDER — CEPHALEXIN 500 MG/1
500 CAPSULE ORAL 4 TIMES DAILY
Qty: 28 CAPSULE | Refills: 0 | Status: SHIPPED | OUTPATIENT
Start: 2023-12-04 | End: 2023-12-11

## 2023-12-04 RX ORDER — FERROUS SULFATE, DRIED 160(50) MG
1 TABLET, EXTENDED RELEASE ORAL 2 TIMES DAILY
Status: DISCONTINUED | OUTPATIENT
Start: 2023-12-04 | End: 2023-12-04 | Stop reason: HOSPADM

## 2023-12-04 RX ORDER — NAPROXEN SODIUM 220 MG/1
81 TABLET, FILM COATED ORAL DAILY
Status: DISCONTINUED | OUTPATIENT
Start: 2023-12-04 | End: 2023-12-04

## 2023-12-04 RX ORDER — CEPHALEXIN 500 MG/1
500 CAPSULE ORAL EVERY 6 HOURS
Status: DISCONTINUED | OUTPATIENT
Start: 2023-12-04 | End: 2023-12-04 | Stop reason: HOSPADM

## 2023-12-04 RX ADMIN — THERA TABS 1 TABLET: TAB at 11:12

## 2023-12-04 RX ADMIN — SACUBITRIL AND VALSARTAN 1 TABLET: 49; 51 TABLET, FILM COATED ORAL at 08:12

## 2023-12-04 RX ADMIN — Medication 1 TABLET: at 08:12

## 2023-12-04 RX ADMIN — HYDRALAZINE HYDROCHLORIDE 35 MG: 10 TABLET, FILM COATED ORAL at 08:12

## 2023-12-04 RX ADMIN — ISOSORBIDE DINITRATE 20 MG: 20 TABLET ORAL at 03:12

## 2023-12-04 RX ADMIN — CEPHALEXIN 500 MG: 500 CAPSULE ORAL at 11:12

## 2023-12-04 RX ADMIN — CLONIDINE HYDROCHLORIDE 0.1 MG: 0.1 TABLET ORAL at 08:12

## 2023-12-04 RX ADMIN — Medication 1 TABLET: at 11:12

## 2023-12-04 RX ADMIN — ISOSORBIDE DINITRATE 20 MG: 20 TABLET ORAL at 08:12

## 2023-12-04 RX ADMIN — INSULIN ASPART 4 UNITS: 100 INJECTION, SOLUTION INTRAVENOUS; SUBCUTANEOUS at 11:12

## 2023-12-04 RX ADMIN — ATORVASTATIN CALCIUM 40 MG: 40 TABLET, FILM COATED ORAL at 11:12

## 2023-12-04 RX ADMIN — ONDANSETRON 4 MG: 2 INJECTION INTRAMUSCULAR; INTRAVENOUS at 08:12

## 2023-12-04 RX ADMIN — INSULIN ASPART 6 UNITS: 100 INJECTION, SOLUTION INTRAVENOUS; SUBCUTANEOUS at 04:12

## 2023-12-04 RX ADMIN — HYDRALAZINE HYDROCHLORIDE 35 MG: 10 TABLET, FILM COATED ORAL at 03:12

## 2023-12-04 RX ADMIN — OXYMETAZOLINE HCL 2 SPRAY: 0.05 SPRAY NASAL at 07:12

## 2023-12-04 RX ADMIN — MORPHINE SULFATE 4 MG: 4 INJECTION INTRAVENOUS at 08:12

## 2023-12-04 RX ADMIN — TACROLIMUS 2 MG: 1 CAPSULE ORAL at 11:12

## 2023-12-04 RX ADMIN — ALLOPURINOL 100 MG: 100 TABLET ORAL at 11:12

## 2023-12-04 RX ADMIN — METOCLOPRAMIDE 10 MG: 5 INJECTION, SOLUTION INTRAMUSCULAR; INTRAVENOUS at 11:12

## 2023-12-04 RX ADMIN — CEPHALEXIN 500 MG: 500 CAPSULE ORAL at 05:12

## 2023-12-04 RX ADMIN — METOPROLOL SUCCINATE 200 MG: 50 TABLET, EXTENDED RELEASE ORAL at 11:12

## 2023-12-04 RX ADMIN — ALLOPURINOL 100 MG: 100 TABLET ORAL at 08:12

## 2023-12-04 RX ADMIN — SACUBITRIL AND VALSARTAN 1 TABLET: 49; 51 TABLET, FILM COATED ORAL at 11:12

## 2023-12-04 RX ADMIN — GABAPENTIN 800 MG: 100 CAPSULE ORAL at 03:12

## 2023-12-04 RX ADMIN — OXYCODONE HYDROCHLORIDE 5 MG: 5 TABLET ORAL at 07:12

## 2023-12-04 RX ADMIN — TACROLIMUS 1 MG: 1 CAPSULE ORAL at 05:12

## 2023-12-04 RX ADMIN — GABAPENTIN 800 MG: 100 CAPSULE ORAL at 08:12

## 2023-12-04 RX ADMIN — MORPHINE SULFATE 2 MG: 2 INJECTION, SOLUTION INTRAMUSCULAR; INTRAVENOUS at 04:12

## 2023-12-04 NOTE — ED NOTES
Patient identifiers have been checked and are correct.      LOC: The patient is awake, alert, and aware of environment. The patient is oriented x 3 and speaking appropriately.   APPEARANCE: No acute distress noted.   PSYCHOSOCIAL: Patient is calm and cooperative.   SKIN: The skin is warm, dry  RESPIRATORY: Airway is open and patent. Bilateral chest rise and fall. Respirations are spontaneous, even and unlabored. Normal effort and rate noted. No accessory muscle use noted.   CARDIAC: Patient has a normal rate   NEUROLOGIC: Eyes open spontaneously. Speech clear. Tolerating saliva secretions well. Able to follow commands, demonstrating ability to actively and appropriately communicate within context of current conversation. Symmetrical facial muscles. Moving all extremities. Movement is purposeful.   MUSCULOSKELETAL: No obvious deformities noted.     Nasal packing noted to L nare, no bleeding noted. Pt denies any current complaints other than wanted to eat- Demetrice, NP aware. Side rails up x2. Call light within reach. Updated on POC.

## 2023-12-04 NOTE — PROCEDURES - EMERGENCY DEPT.
"Epistaxis Mgmt    Date/Time: 2023 10:39 AM    Performed by: Khadar Alfonso MD  Authorized by: Khadar Alfonso MD  Consent Done: Yes  Consent: Verbal consent obtained.  Consent given by: patient  Patient understanding: patient states understanding of the procedure being performed  Patient identity confirmed: , SHUKRI, provided demographic data, name and verbally with patient  Time out: Immediately prior to procedure a "time out" was called to verify the correct patient, procedure, equipment, support staff and site/side marked as required.  Preparation: Patient was prepped and draped in the usual sterile fashion.    Patient sedated: no  Treatment site: left anterior and left posterior  Repair method: Rhino Rocket  Post-procedure assessment: bleeding stopped  Treatment complexity: simple  Patient tolerance: Patient tolerated the procedure well with no immediate complications          "

## 2023-12-04 NOTE — PLAN OF CARE
[facility] - Emergency Dept  Initial Discharge Assessment       Primary Care Provider: Emanuel Lopez MD    Admission Diagnosis: Epistaxis [R04.0]    Admission Date: 12/4/2023  Expected Discharge Date:     Pt stated he uses a straight cane for ambulation and is independent with his ADL's and does not require assistance.    Pt to d/c home with no needs when ready    Transition of Care Barriers: (P) None    Payor: [employer]ARE / Plan: [employer] / Product Type: Medicare Advantage /     Extended Emergency Contact Information  Primary Emergency Contact: CarlosCely  Address: [address] [place] [address] [facility]  Home Phone: [phone]  Work Phone: [phone]  Mobile Phone: [phone]  Relation: Spouse  Preferred language: English    Discharge Plan A: (P) Home  Discharge Plan B: (P) Home      [id number] [place] [facility] [address]  [address] [place] [address]  Phone: [phone] Fax: [phone]    [facility] [place] [facility] [address]  [address]  [address]  [facility] [address]  Phone: [phone] Fax: [phone]    [facility]  [address]  [place] [address]  Phone: [phone] Fax: [phone]    [facility]  [address]  [facility] [address]  Phone: [phone] Fax: [phone]    [facility] - [place] - [address]  [address]  [facility] [address]  Phone: [phone] Fax: [phone]      Initial Assessment (most recent)       Adult Discharge Assessment - 12/04/23 1655          Discharge Assessment    Assessment Type Discharge Planning Assessment (P)      Confirmed/corrected address, phone number and insurance Yes (P)      Confirmed Demographics Correct on Facesheet (P)      Source of Information patient (P)      Reason For Admission Epistaxis (P)      People in Home spouse  (P)      Facility Arrived From: home (P)      Do you expect to return to your current living situation? Yes (P)      Do you have help at home or someone to help you manage your care at home? No (P)      Prior to hospitilization cognitive status: Alert/Oriented;No Deficits (P)      Current cognitive status: Alert/Oriented;No Deficits (P)      Walking or Climbing Stairs ambulation difficulty, requires equipment (P)      Mobility Management straight cane (P)      Home Accessibility not wheelchair accessible;stairs to enter home (P)      Number of Stairs, Main Entrance three (P)      Home Layout Able to live on 1st floor (P)      Equipment Currently Used at Home cane, straight (P)      Patient currently being followed by outpatient case management? No (P)      Do you currently have service(s) that help you manage your care at home? No (P)      Do you have any problems affording any of your prescribed medications? No (P)      Is the patient taking medications as prescribed? yes (P)      Who is going to help you get home at discharge? family/friends (P)      How do you get to doctors appointments? car, drives self (P)      Are you on dialysis? No (P)      Do you take coumadin? No (P)      DME Needed Upon Discharge  none (P)      Discharge Plan discussed with: Patient (P)      Transition of Care Barriers None (P)      Discharge Plan A Home (P)      Discharge Plan B Home (P)         Physical Activity    On average, how many days per week do you engage in moderate to strenuous exercise (like a brisk walk)? 3 days (P)      On average, how many minutes do you engage in exercise at this level? 90 min (P)         Financial Resource Strain    How hard is it for you to pay for the very basics like food, housing, medical care, and heating? Not very hard (P)         Housing Stability    In the last 12 months, was there a time when you were not able to pay the mortgage or rent on time? No (P)      In the last 12 months, was there a  time when you did not have a steady place to sleep or slept in a shelter (including now)? No (P)         Transportation Needs    In the past 12 months, has lack of transportation kept you from medical appointments or from getting medications? No (P)      In the past 12 months, has lack of transportation kept you from meetings, work, or from getting things needed for daily living? No (P)         Food Insecurity    Within the past 12 months, you worried that your food would run out before you got the money to buy more. Never true (P)      Within the past 12 months, the food you bought just didn't last and you didn't have money to get more. Never true (P)         Stress    Do you feel stress - tense, restless, nervous, or anxious, or unable to sleep at night because your mind is troubled all the time - these days? Only a little (P)         Social Connections    In a typical week, how many times do you talk on the phone with family, friends, or neighbors? More than three times a week (P)      How often do you get together with friends or relatives? More than three times a week (P)      How often do you attend Adventism or Muslim services? 1 to 4 times per year (P)      Do you belong to any clubs or organizations such as Adventism groups, unions, fraternal or athletic groups, or school groups? No (P)      How often do you attend meetings of the clubs or organizations you belong to? Never (P)      Are you , , , , never , or living with a partner?  (P)         Alcohol Use    Q1: How often do you have a drink containing alcohol? Never (P)      Q2: How many drinks containing alcohol do you have on a typical day when you are drinking? Patient does not drink (P)      Q3: How often do you have six or more drinks on one occasion? Never (P)         OTHER    Name(s) of People in Home spouse Cely (P)                         Randi Lopez CD, MSW, LMSW, RSW   Case  Management  [facility]  Email: [email or web address]

## 2023-12-04 NOTE — ASSESSMENT & PLAN NOTE
Vick Gonzalez is a 67 y.o. male w/ PMH of HTN and liver transplant presents with left sided epistaxis.   ENT consulted for epistaxis.     - Agree with admission to hospital medicine given current epistaxis   - Maintain left Rhinorocket for 3-5 days. If patient admitted, can be removed in 5 days (can reach out to ENT) or could be removed in PCP clinic or ED clinic in 3-5 days (deflate balloon, observe for bleeding, remove after 5 min if no bleeding  - Please place patient on anti-staph antibiotics while packing is in place  - May place mustache dressing under nose for trickling, ok if dressing becomes saturated with some red/brown drainage  - Nasal saline q4h while packing in place, even on side with nasal packing. Can continue nasal saline sprays to keep mucosa moist to prevent future bleeding.  - Keep head of bed elevated  - Apply Afrin to affected nasal cavity if epistaxis recurs, please see detailed instructions below   - Please call ENT with questions  - Remainder of care per primary team        PATIENT INSTRUCTIONS:    Nasal precautions  - DO NOT blow your nose for 2 weeks. The only exception is that you may lightly blow your nose after using a sinus rinse.  - Sneeze/cough with mouth open  - Avoid irritating substances that might make you sneeze, such as dust, chalk, harsh chemicals, and allergic triggers. This might also include spicy foods.  - Do not smoke   - Avoid flying or swimming for 2 weeks.    - Avoid all heavy lifting, straining or bending for 2 weeks.   - Avoid semi-contact sports or vigorous exercising for 3-4 weeks.      Aftercare/ moisturizing recommendations to decrease frequency of nosebleeds:  - Daily nasal saline sprays/rinses  - Nightly application of vaseline/aquaphor to anterior nares  - Room humidification  - Avoidance of nasal cannula if possible (always with humidification and please use face tent, nasal cup, facemask if possible)  Management of medical conditions contributing to  epistaxis (coagulopathy, hypertension, etc.)  - Humidification of CPAP appliance if applicable    If rebleeding occurs:   - Apply Afrin liberally to both nostrils  - Apply pressure over the soft part of the nose for 15 minutes (clip or digital pressure, important to have pressure over soft part of nose and consistent pressure (do not release pressure at any point))  - Instruct patient to lean forward, avoid swallowing blood. This can cause nausea and vomiting  - Can repeat these steps above up to 3 times  - Call/reconsult ENT or return to the nearest emergency department if this is unsuccessful

## 2023-12-04 NOTE — ED NOTES
Pt sleeping in bed. No distress noted. RR even and unlabored. Call light within reach. Side rails up x2.

## 2023-12-04 NOTE — DISCHARGE INSTRUCTIONS
The ear nose and throat clinic should follow you up because of your history of nosebleeds and your complaints.  They will contact you to help arrange a follow-up.    If you experience another nosebleed, bloody nose get all the blood clots out.  Use over-the-counter Afrin 2 sprays in each nostril.  Compress your nostrils for 15 minutes.  If bleeding does not decrease after this, return to care    Return to the emergency department immediately if any new or concerning symptoms occur    Nasal precautions  - DO NOT blow your nose for 2 weeks. The only exception is that you may lightly blow your nose after using a sinus rinse.  - Sneeze/cough with mouth open  - Avoid irritating substances that might make you sneeze, such as dust, chalk, harsh chemicals, and allergic triggers. This might also include spicy foods.  - Do not smoke   - Avoid flying or swimming for 2 weeks.    - Avoid all heavy lifting, straining or bending for 2 weeks.   - Avoid semi-contact sports or vigorous exercising for 3-4 weeks.       Aftercare/ moisturizing recommendations to decrease frequency of nosebleeds:  - Daily nasal saline sprays/rinses  - Nightly application of vaseline/aquaphor to anterior nares  - Room humidification  - Avoidance of nasal cannula if possible (always with humidification and please use face tent, nasal cup, facemask if possible)  Management of medical conditions contributing to epistaxis (coagulopathy, hypertension, etc.)  - Humidification of CPAP appliance if applicable     If rebleeding occurs:   - Apply Afrin liberally to both nostrils  - Apply pressure over the soft part of the nose for 15 minutes (clip or digital pressure, important to have pressure over soft part of nose and consistent pressure (do not release pressure at any point))  - Instruct patient to lean forward, avoid swallowing blood. This can cause nausea and vomiting  - Can repeat these steps above up to 3 times  - Call/reconsult ENT or return to the nearest  emergency department if this is unsuccessful

## 2023-12-04 NOTE — SUBJECTIVE & OBJECTIVE
Medications:  Continuous Infusions:  Scheduled Meds:   acetaminophen  650 mg Oral ED 1 Time     PRN Meds:     No current facility-administered medications on file prior to encounter.     Current Outpatient Medications on File Prior to Encounter   Medication Sig    allopurinoL (ZYLOPRIM) 100 MG tablet TAKE 1 TABLET BY MOUTH TWICE A DAY    TRUE METRIX GLUCOSE TEST STRIP Strp USE 3 TIMES DAILY TO TEST BLOOD GLUCOSE LEVEL    albuterol (PROAIR HFA) 90 mcg/actuation inhaler Inhale 2 puffs into the lungs every 6 (six) hours as needed for Wheezing. Rescue    aluminum-magnesium hydroxide-simethicone (MAALOX) 200-200-20 mg/5 mL Susp Take 30 mLs by mouth 4 (four) times daily before meals and nightly. (Patient taking differently: Take 30 mLs by mouth every 6 (six) hours as needed.)    aspirin 81 MG Chew Take 1 tablet (81 mg total) by mouth once daily.    atorvastatin (LIPITOR) 40 MG tablet Take 1 tablet (40 mg total) by mouth once daily.    blood-glucose meter Misc 1 Device by Misc.(Non-Drug; Combo Route) route 3 (three) times daily.    blood-glucose meter,continuous (DEXCOM G7 ) Misc Use as directed.    blood-glucose sensor (DEXCOM G7 SENSOR) Viviane Use as directed.    blood-glucose transmitter (DEXCOM G6 TRANSMITTER) Viviane Use as directed    calcium carbonate-vitamin D3 (CALCIUM 600 WITH VITAMIN D3) 600 mg(1,500mg) -400 unit Chew Take 1 tablet by mouth once daily.     clopidogreL (PLAVIX) 75 mg tablet Take 1 tablet (75 mg total) by mouth once daily.    cyanocobalamin (VITAMIN B-12) 100 MCG tablet Take 100 mcg by mouth once daily.    diphenhydrAMINE (BENADRYL) 25 mg capsule Take 25 mg by mouth daily as needed for Allergies (Cold).    furosemide (LASIX) 20 MG tablet Take 2 tablets (40 mg total) by mouth daily as needed (For Weight Gain > 2-3 lbs in 1 day or 4-6 lbs over 1 week notify PCP and take 40 mg daily for three days).    gabapentin (NEURONTIN) 800 MG tablet Take 1 tablet (800 mg total) by mouth 3 (three) times  "daily.    insulin glargine U-300 conc (TOUJEO MAX U-300 SOLOSTAR) 300 unit/mL (3 mL) insulin pen Inject 40 Units into the skin once daily. (Patient taking differently: Inject 40 Units into the skin every evening.)    insulin lispro (HUMALOG KWIKPEN INSULIN) 100 unit/mL pen Inject 20 Units into the skin 3 (three) times daily with meals.    isosorbide-hydrALAZINE 20-37.5 mg (BIDIL) 20-37.5 mg Tab Take 1 tablet by mouth 3 (three) times daily.    lancets 30 gauge Misc 1 lancet by Misc.(Non-Drug; Combo Route) route 4 (four) times daily before meals and nightly.    levothyroxine (SYNTHROID) 88 MCG tablet TAKE 1 TABLET BY MOUTH EVERY DAY    metoprolol succinate (TOPROL-XL) 200 MG 24 hr tablet TAKE 1 TABLET BY MOUTH EVERY DAY    multivit with min-folic acid (MEN'S MULTIVITAMIN GUMMIES) 200 mcg Chew Take 1 tablet by mouth once daily.    NOVOFINE PLUS 32 gauge x 1/6" Ndle     papaverine 30 mg/mL injection Tri-Mix - PGE (alprostadil) 10mcg, papavarine 30mg, phentolamine 1mg; dispense 5mL vial, typical starting is 0.2 mL which is equal to 20 units    sacubitriL-valsartan (ENTRESTO) 49-51 mg per tablet Take 1 tablet by mouth 2 (two) times daily.    tacrolimus (PROGRAF) 1 MG Cap Take 2 capsules (2 mg total) by mouth every morning AND 1 capsule (1 mg total) every evening.    traZODone (DESYREL) 50 MG tablet TAKE 1 TABLET BY MOUTH EVERY DAY IN THE EVENING    [DISCONTINUED] insulin aspart U-100 (NOVOLOG FLEXPEN U-100 INSULIN) 100 unit/mL (3 mL) InPn pen Inject 20 Units into the skin 3 (three) times daily with meals.       Review of patient's allergies indicates:  No Known Allergies    Past Medical History:   Diagnosis Date    Acute congestive heart failure 5/12/2023    Anemia     Anxiety     Cataract     Chronic pain syndrome 07/13/2011    CKD (chronic kidney disease), stage III     Coronary artery disease involving native coronary artery of native heart with angina pectoris 8/24/2023    Diabetes mellitus type II, uncontrolled     " Discitis of lumbosacral region 01/16/2015    ED (erectile dysfunction)     Encounter for blood transfusion     Genital herpes     Gout, arthritis     History of alcohol abuse     History of hepatitis C, s/p successful Rx w/ SVR24 (cure) - 5/2018 08/23/2011    Completed 24wks Epclusa + RBV w/SVR12 - 2/2018  -     History of positive PPD, treatment status unknown     Pulmonary granulomas, negative sputum cultures for AFB and indeterminate quantferon test    History of substance abuse     Hypertension     Hypothyroidism     Liver replaced by transplant 08/23/2011    DATE: 12/16/2013  LIVER BIOPSY : REASON:  hep C staging  PATHOLOGY COMMITTEE NOTE/PLAN: :grade  1 / stage 1        Pancreatitis 2016    Peptic ulcer disease     Pulmonary emphysema, unspecified emphysema type 5/26/2023     Past Surgical History:   Procedure Laterality Date    ANTERIOR CERVICAL DISCECTOMY W/ FUSION N/A 8/22/2022    Procedure: Procedure: ACDF 4-7 LOS: 4.0 Anesthesia: General Blood: Type & Screen Radiology: C-Arm Microscope: ------- SNS: EMG, SEP, MEP Brace: [place] Bed: Regular Bed, Shoulder Strap Headrest:------ Position: Supine Equipment: Depuy;  Surgeon: Titi Christian MD;  Location: [facility] OR;  Service: Neurosurgery;  Laterality: N/A;  Depuy  confirmed CW 8/19  Neuro monitoring confirmed CW 8/19    CARPAL TUNNEL RELEASE Left 10/29/2021    Procedure: RELEASE, CARPAL TUNNEL;  Surgeon: Jameson Alejo Jr., MD;  Location: [facility] OR;  Service: Orthopedics;  Laterality: Left;    CHOLECYSTECTOMY      CORONARY ANGIOGRAPHY N/A 8/2/2023    Procedure: ANGIOGRAM, CORONARY ARTERY;  Surgeon: Juan Vera MD;  Location: [facility] CATH LAB/EP;  Service: Cardiology;  Laterality: N/A;    DECOMPRESSION OF CERVICAL SPINE BY ANTERIOR APPROACH WITH FUSION  8/22/2022    Procedure: DECOMPRESSION AND FUSION, SPINE, CERVICAL, ANTERIOR APPROACH;  Surgeon: Titi Christian MD;  Location: [facility] OR;  Service: Neurosurgery;;    INJECTION OF JOINT Right 12/2/2019     Procedure: INJECTION, JOINT SI;  Surgeon: Thu Penn MD;  Location: [facility] PAIN MGT;  Service: Pain Management;  Laterality: Right;  RT SI JNT INJ    LEFT HEART CATHETERIZATION Left 8/2/2023    Procedure: Left heart cath;  Surgeon: Juan Vera MD;  Location: [facility] CATH LAB/EP;  Service: Cardiology;  Laterality: Left;    LIVER TRANSPLANT  06/2010    SPINE SURGERY      TRANSFORAMINAL EPIDURAL INJECTION OF STEROID Left 5/29/2023    Procedure: INJECTION, STEROID, EPIDURAL, TRANSFORAMINAL APPROACH,  LEFT C7-T1 DIRECT REF;  Surgeon: Thu Penn MD;  Location: [facility] PAIN MGT;  Service: Pain Management;  Laterality: Left;  4/3 MD ILL/PT WILL C/B     Family History       Problem Relation (Age of Onset)    Cancer Mother, Maternal Uncle (82)    Diabetes Mother, Sister    Drug abuse Daughter    Heart disease Mother          Tobacco Use    Smoking status: Former     Passive exposure: Never    Smokeless tobacco: Never   Substance and Sexual Activity    Alcohol use: No     Comment: over 5 years ago, none currently    Drug use: Not Currently     Comment: Former cocaine use    Sexual activity: Yes     Review of Systems  Objective:     Vital Signs (Most Recent):  Temp: 98.3 °F (36.8 °C) (12/04/23 0834)  Pulse: 86 (12/04/23 0834)  Resp: 20 (12/04/23 0853)  BP: (!) 199/121 (12/04/23 0834)  SpO2: 100 % (12/04/23 0834) Vital Signs (24h Range):  Temp:  [98.1 °F (36.7 °C)-98.3 °F (36.8 °C)] 98.3 °F (36.8 °C)  Pulse:  [76-86] 86  Resp:  [16-20] 20  SpO2:  [99 %-100 %] 100 %  BP: (155-199)/() 199/121     Weight: 118.4 kg (261 lb)  Body mass index is 33.51 kg/m².         Physical Exam  NAD  Left rhinorocket placed  Right dried blood, no active bleeding  OP no active bleeding  Neck soft  No respiratory distress     Significant Labs:  CBC:   Recent Labs   Lab 12/04/23  0757   WBC 8.38   RBC 4.14*   HGB 12.0*   HCT 37.3*   *   MCV 90   MCH 29.0   MCHC 32.2     CMP:   Recent Labs   Lab 12/04/23  0644   *   CALCIUM 8.7    ALBUMIN 3.8   PROT 7.3      K 5.0   CO2 16*      BUN 14   CREATININE 1.1   ALKPHOS 88   ALT 18   AST 18   BILITOT 0.4       Significant Diagnostics:  I have reviewed and interpreted all pertinent imaging results/findings within the past 24 hours.

## 2023-12-04 NOTE — PROVIDER PROGRESS NOTES - EMERGENCY DEPT.
Encounter Date: 12/4/2023    ED Physician Progress Notes        Physician Note:   AOC @ 0600. Patient presented w/ resolved epistaxis. Concern for possible acute hepatic or renal dysfunction given history of liver transplant    Pending studies include CBC CMP    Pending actions include follow-up lab assays    Likely disposition is discharge home    Khadar Alfonso    7:46 AM  CMP reassuring.  ENT referral placed.    8:45 a.m.  Patient experienced recurrence of moderate volume bleeding.  He had blood coming from both nares and blood in his posterior oropharynx.  The patient blow his nose.  I have placed Afrin in both nostrils and direct pressure.  This did not resolve his bleeding.  On reexamination I saw no bleeding from the right Babb.  I saw significant bleeding from the left Babb.  I placed an anterior-posterior packing balloon.  The patient can only tolerate 5 mL of air in the balloon at 1st.  I subsequently was able to add another 7 mL and achieve hemostasis.  He was significantly uncomfortable and hypertensive at this time.  Concerned that given patient's history of immunosuppression, anticoagulation, and significant headache and hypertension, that he is a poor candidate for immediate discharge.  Will plan for observation stay for ENT consultation, blood pressure control, symptom relief, initiation of antibiotics.

## 2023-12-04 NOTE — ED NOTES
Pt remains AAox3, resting in bed, watching TV. In no acute distress. RR even and unlabored. No new complaints voiced. Updated on POC. Side rails up x2. Call light within reach. Family member noted at bedside.

## 2023-12-04 NOTE — H&P
"ED Observation Unit  History and Physical      I assumed care of this patient from the Main ED at onset of observation time, 0909 on 12/04/2023.       History of Present Illness:    Pt is a 66 yo M who presents with concern for epistaxis that started 2 days ago.  He reports it has been intermittent.  He states he does not recall having anything like this before.  He states he was seen at [facility] and they told him he had "a little scratch."  He states "there is no way a little scratch could cause so much blood."  He is very concerned he has lost much blood.     I reviewed the ED Provider Note dated 12/4/23 prior to my evaluation of this patient.  I reviewed all labs and imaging performed in the Main ED, prior to patient being placed in Observation. Patient was placed in the ED Observation Unit for Epistaxis.    PMHx   Past Medical History:   Diagnosis Date    Acute congestive heart failure 5/12/2023    Anemia     Anxiety     Cataract     Chronic pain syndrome 07/13/2011    CKD (chronic kidney disease), stage III     Coronary artery disease involving native coronary artery of native heart with angina pectoris 8/24/2023    Diabetes mellitus type II, uncontrolled     Discitis of lumbosacral region 01/16/2015    ED (erectile dysfunction)     Encounter for blood transfusion     Genital herpes     Gout, arthritis     History of alcohol abuse     History of hepatitis C, s/p successful Rx w/ SVR24 (cure) - 5/2018 08/23/2011    Completed 24wks Epclusa + RBV w/SVR12 - 2/2018  -     History of positive PPD, treatment status unknown     Pulmonary granulomas, negative sputum cultures for AFB and indeterminate quantferon test    History of substance abuse     Hypertension     Hypothyroidism     Liver replaced by transplant 08/23/2011    DATE: 12/16/2013  LIVER BIOPSY : REASON:  hep C staging  PATHOLOGY COMMITTEE NOTE/PLAN: :grade  1 / stage 1        Pancreatitis 2016    Peptic ulcer disease     Pulmonary emphysema, " unspecified emphysema type 5/26/2023      Past Surgical History:   Procedure Laterality Date    ANTERIOR CERVICAL DISCECTOMY W/ FUSION N/A 8/22/2022    Procedure: Procedure: ACDF 4-7 LOS: 4.0 Anesthesia: General Blood: Type & Screen Radiology: C-Arm Microscope: ------- SNS: EMG, SEP, MEP Brace: [place] Bed: Regular Bed, Shoulder Strap Headrest:------ Position: Supine Equipment: Depuy;  Surgeon: Titi Christian MD;  Location: [facility] OR;  Service: Neurosurgery;  Laterality: N/A;  Depuy  confirmed CW 8/19  Neuro monitoring confirmed CW 8/19    CARPAL TUNNEL RELEASE Left 10/29/2021    Procedure: RELEASE, CARPAL TUNNEL;  Surgeon: Jameson Alejo Jr., MD;  Location: [facility] OR;  Service: Orthopedics;  Laterality: Left;    CHOLECYSTECTOMY      CORONARY ANGIOGRAPHY N/A 8/2/2023    Procedure: ANGIOGRAM, CORONARY ARTERY;  Surgeon: Juan Vera MD;  Location: [facility] CATH LAB/EP;  Service: Cardiology;  Laterality: N/A;    DECOMPRESSION OF CERVICAL SPINE BY ANTERIOR APPROACH WITH FUSION  8/22/2022    Procedure: DECOMPRESSION AND FUSION, SPINE, CERVICAL, ANTERIOR APPROACH;  Surgeon: Titi Christian MD;  Location: [facility] OR;  Service: Neurosurgery;;    INJECTION OF JOINT Right 12/2/2019    Procedure: INJECTION, JOINT SI;  Surgeon: Thu Penn MD;  Location: [facility] [place] PAIN MGT;  Service: Pain Management;  Laterality: Right;  RT SI JNT INJ    LEFT HEART CATHETERIZATION Left 8/2/2023    Procedure: Left heart cath;  Surgeon: Juan Vera MD;  Location: [facility] CATH LAB/EP;  Service: Cardiology;  Laterality: Left;    LIVER TRANSPLANT  06/2010    SPINE SURGERY      TRANSFORAMINAL EPIDURAL INJECTION OF STEROID Left 5/29/2023    Procedure: INJECTION, STEROID, EPIDURAL, TRANSFORAMINAL APPROACH,  LEFT C7-T1 DIRECT REF;  Surgeon: hTu Penn MD;  Location: [facility] [place] PAIN MGT;  Service: Pain Management;  Laterality: Left;  4/3 MD ILL/PT WILL C/B        Family Hx   Family History   Problem Relation Age of Onset    Cancer Mother     Diabetes Mother      Heart disease Mother     Diabetes Sister     Cancer Maternal Uncle 82        colon CA    Drug abuse Daughter     Melanoma Neg Hx     Psoriasis Neg Hx     Lupus Neg Hx     Eczema Neg Hx     Acne Neg Hx         Social Hx   Social History     Socioeconomic History    Marital status:    Tobacco Use    Smoking status: Former     Passive exposure: Never    Smokeless tobacco: Never   Substance and Sexual Activity    Alcohol use: No     Comment: over 5 years ago, none currently    Drug use: Not Currently     Comment: Former cocaine use    Sexual activity: Yes     Social Determinants of Health     Financial Resource Strain: Low Risk  (5/15/2023)    Overall Financial Resource Strain (CARDIA)     Difficulty of Paying Living Expenses: Not very hard   Food Insecurity: No Food Insecurity (5/15/2023)    Hunger Vital Sign     Worried About Running Out of Food in the Last Year: Never true     Ran Out of Food in the Last Year: Never true   Transportation Needs: No Transportation Needs (8/2/2023)    PRAPARE - Transportation     Lack of Transportation (Medical): No     Lack of Transportation (Non-Medical): No   Physical Activity: Insufficiently Active (8/2/2023)    Exercise Vital Sign     Days of Exercise per Week: 3 days     Minutes of Exercise per Session: 20 min   Stress: No Stress Concern Present (5/15/2023)    [employer] - Occupational Stress Questionnaire     Feeling of Stress : Not at all   Social Connections: Moderately Integrated (8/2/2023)    Social Connection and Isolation Panel [NHANES]     Frequency of Communication with Friends and Family: Three times a week     Frequency of Social Gatherings with Friends and Family: Three times a week     Attends Hinduism Services: More than 4 times per year     Active Member of Clubs or Organizations: No     Attends Club or Organization Meetings: Never     Marital Status:    Recent Concern: Social Connections - Moderately Isolated (5/15/2023)     Social Connection and Isolation Panel [NHANES]     Frequency of Communication with Friends and Family: Three times a week     Frequency of Social Gatherings with Friends and Family: Three times a week     Attends Buddhist Services: More than 4 times per year     Active Member of Clubs or Organizations: No     Attends Club or Organization Meetings: Never     Marital Status:    Housing Stability: Low Risk  (8/2/2023)    Housing Stability Vital Sign     Unable to Pay for Housing in the Last Year: No     Number of Places Lived in the Last Year: 1     Unstable Housing in the Last Year: No        Vital Signs   Vitals:    12/04/23 0706 12/04/23 0715 12/04/23 0834 12/04/23 0853   BP: (!) 182/90  (!) 199/121    Patient Position: Lying      Pulse: 76  86    Resp:  16 18 20   Temp: 98.1 °F (36.7 °C)  98.3 °F (36.8 °C)    TempSrc: Oral      SpO2: 100%  100%    Weight:       Height:            Review of Systems    Review of Systems   Constitutional:  Negative for fever.   HENT:  Positive for nosebleeds. Negative for sore throat.    Respiratory:  Negative for shortness of breath.    Cardiovascular:  Negative for chest pain.   Gastrointestinal:  Negative for nausea.   Genitourinary:  Negative for dysuria.   Musculoskeletal:  Negative for back pain.   Skin:  Negative for rash.   Neurological:  Negative for weakness.   Hematological:  Does not bruise/bleed easily.     Physical Exam  Nursing note and vitals reviewed.  Constitutional: He appears well-developed and well-nourished. He is not diaphoretic. No distress.   HENT:   Head: Normocephalic and atraumatic.   Nose: Nose normal. No nose lacerations, sinus tenderness, nasal deformity, septal deviation or nasal septal hematoma. No epistaxis.  No foreign bodies.   Epistaxis appears controlled at this time with anterior-posterior packing placed in the left nare.  Eyes: Conjunctivae are normal.   Neck: Neck supple.   Cardiovascular:  Normal rate, normal heart sounds and  intact distal pulses.           Pulmonary/Chest: Breath sounds normal. No respiratory distress. He has no wheezes. He has no rhonchi.   Abdominal: Abdomen is soft. He exhibits no distension. There is no abdominal tenderness.   Musculoskeletal:      Cervical back: Neck supple.      Neurological: He is oriented to person, place, and time. GCS score is 15. GCS eye subscore is 4. GCS verbal subscore is 5. GCS motor subscore is 6.   Skin: Skin is warm and dry. Capillary refill takes less than 2 seconds.   Psychiatric: He has a normal mood and affect. His behavior is normal. Judgment and thought content normal.     Medications:   Scheduled Meds:   acetaminophen  650 mg Oral ED 1 Time     Continuous Infusions:  PRN Meds:.      Assessment/Plan:  Epistaxis  -67-year-old male with a history of liver transplant on Plavix. Patient presented overnight with epistaxis. It resolved but recurred and was significantly emergency department.  -It appears controlled at this time with anterior-posterior packing placed in the left nare.  -Concern for possible acute hepatic or renal dysfunction given history of liver transplant  -CMP and CBC reassuring   -ENT consulted. Dr. Pedraza states ok for pt to be discharged home from her perspective with rhinorocket in place and outpatient follow up. Otherwise they will follow along if he is upgraded to hospital medicine.  -Given his out-of-control hypertension, Plavix use, and immunosuppression for remote history of liver transplant, he is a poor candidate to discharge with posterior packing in place.    -Plan to place in EDOU for blood pressure control, ENT consultation and trend H&H.   -Likely disposition is discharge home  -Will upgrade to  if needed  -If pt is discharged home, he will get a call about following up with Dr. Amaya on 12/6/23   -prophylactic Keflex 500 mg every 6 hr x 7 days while rhinorocket is in place. Outpatient order placed by Dr. Alfonso  -Pt is complaining of  discomfort from the rhinorocket- discussed with ENT- It's unfortunately going to hurt. You can give him pain meds and discharge him with some. If it's really severe, deflate ballon like 1cc but nothing more than that.     Hypertension, uncontrolled  -received Clonidine 0.1 mg orally at 8:53 am on 12/4/23  -per Dr. Alfonso-  if his HR stays above 70, give him another clonidine 12 hours later (8:53 pm) if he's still >160/>100   -continue home medications      .  .     Case was discussed with the ED provider, Khadar Alfonso MD and ENT.

## 2023-12-04 NOTE — ED NOTES
Pt reports pain medication is not helping. Pain remains 7/10 to PRISCA ma. SUREKHA Smith aware, stated he would reach out to ENT, otherwise no new orders received.

## 2023-12-04 NOTE — HPI
67-year-old male with extensive history as listed below notable for HTN and liver transplant presents for recurrent epistaxis. Reports that he began on Friday, went to ED and was managed with Afrin and pressure. He was sent home, and he has had 2 more episodes that resolved with Afrin and pressure. Reports that this morning at 3am, he had another episode that did not stop which led him to come to the ED. ED placed a left rhinorocket and noted abrasion at left septum. No bleeding in the right nare. He is currently hemostatic and will be admitted to hospital medicine. He endorses headaches, but denies chest pain, shortness of breath, nausea, vomiting.

## 2023-12-04 NOTE — ED NOTES
Bed: EDOU06  Expected date: 12/4/23  Expected time:   Means of arrival:   Comments:  Dirty- needs house keeping

## 2023-12-04 NOTE — ED NOTES
Pt AAOx3, resting in bed, watching TV. In no acute distress. RR even and unlabored. Requesting medication for L nasal pain-site of nasal packing. No other complaints voiced. Wife at bedside. Side rails up x2. Call light within reach. Updated on POC.

## 2023-12-04 NOTE — ED PROVIDER NOTES
"Encounter Date: 12/4/2023       History     Chief Complaint   Patient presents with    Epistaxis     States nosebleed this am. States also happened Friday. No bleeding at triage. Also complaining of headache/ear ache     Pt is a 66 yo M who presents with concern for epistaxis that started 2 days ago.  He reports it has been intermittent.  He states he does not recall having anything like this before.  He states he was seen at [facility] and [place] and they told him he had "a little scratch."  He states "there is no way a little scratch could cause so much blood."  He is very concerned he has lost much blood.    The history is provided by the patient.     Review of patient's allergies indicates:  No Known Allergies  Past Medical History:   Diagnosis Date    Acute congestive heart failure 5/12/2023    Anemia     Anxiety     Cataract     Chronic pain syndrome 07/13/2011    CKD (chronic kidney disease), stage III     Coronary artery disease involving native coronary artery of native heart with angina pectoris 8/24/2023    Diabetes mellitus type II, uncontrolled     Discitis of lumbosacral region 01/16/2015    ED (erectile dysfunction)     Encounter for blood transfusion     Genital herpes     Gout, arthritis     History of alcohol abuse     History of hepatitis C, s/p successful Rx w/ SVR24 (cure) - 5/2018 08/23/2011    Completed 24wks Epclusa + RBV w/SVR12 - 2/2018  -     History of positive PPD, treatment status unknown     Pulmonary granulomas, negative sputum cultures for AFB and indeterminate quantferon test    History of substance abuse     Hypertension     Hypothyroidism     Liver replaced by transplant 08/23/2011    DATE: 12/16/2013  LIVER BIOPSY : REASON:  hep C staging  PATHOLOGY COMMITTEE NOTE/PLAN: :grade  1 / stage 1        Pancreatitis 2016    Peptic ulcer disease     Pulmonary emphysema, unspecified emphysema type 5/26/2023     Past Surgical History:   Procedure Laterality Date    ANTERIOR CERVICAL " DISCECTOMY W/ FUSION N/A 8/22/2022    Procedure: Procedure: ACDF 4-7 LOS: 4.0 Anesthesia: General Blood: Type & Screen Radiology: C-Arm Microscope: ------- SNS: EMG, SEP, MEP Brace: Beaver Bed: Regular Bed, Shoulder Strap Headrest:------ Position: Supine Equipment: Depuy;  Surgeon: Titi Christian MD;  Location: [facility] OR;  Service: Neurosurgery;  Laterality: N/A;  Depuy  confirmed CW 8/19  Neuro monitoring confirmed CW 8/19    CARPAL TUNNEL RELEASE Left 10/29/2021    Procedure: RELEASE, CARPAL TUNNEL;  Surgeon: Jameson Alejo Jr., MD;  Location: [facility] OR;  Service: Orthopedics;  Laterality: Left;    CHOLECYSTECTOMY      CORONARY ANGIOGRAPHY N/A 8/2/2023    Procedure: ANGIOGRAM, CORONARY ARTERY;  Surgeon: Juan Vera MD;  Location: [facility] CATH LAB/EP;  Service: Cardiology;  Laterality: N/A;    DECOMPRESSION OF CERVICAL SPINE BY ANTERIOR APPROACH WITH FUSION  8/22/2022    Procedure: DECOMPRESSION AND FUSION, SPINE, CERVICAL, ANTERIOR APPROACH;  Surgeon: Titi Christian MD;  Location: [facility] OR;  Service: Neurosurgery;;    INJECTION OF JOINT Right 12/2/2019    Procedure: INJECTION, JOINT SI;  Surgeon: Thu Penn MD;  Location: [facility] PAIN MGT;  Service: Pain Management;  Laterality: Right;  RT SI JNT INJ    LEFT HEART CATHETERIZATION Left 8/2/2023    Procedure: Left heart cath;  Surgeon: Juan Vera MD;  Location: [facility] CATH LAB/EP;  Service: Cardiology;  Laterality: Left;    LIVER TRANSPLANT  06/2010    SPINE SURGERY      TRANSFORAMINAL EPIDURAL INJECTION OF STEROID Left 5/29/2023    Procedure: INJECTION, STEROID, EPIDURAL, TRANSFORAMINAL APPROACH,  LEFT C7-T1 DIRECT REF;  Surgeon: Thu Penn MD;  Location: [facility] PAIN MGT;  Service: Pain Management;  Laterality: Left;  4/3 MD ILL/PT WILL C/B     Family History   Problem Relation Age of Onset    Cancer Mother     Diabetes Mother     Heart disease Mother     Diabetes Sister     Cancer Maternal Uncle 82        colon CA    Drug abuse Daughter     Melanoma Neg Hx      Psoriasis Neg Hx     Lupus Neg Hx     Eczema Neg Hx     Acne Neg Hx      Social History     Tobacco Use    Smoking status: Former     Passive exposure: Never    Smokeless tobacco: Never   Substance Use Topics    Alcohol use: No     Comment: over 5 years ago, none currently    Drug use: Not Currently     Comment: Former cocaine use         Physical Exam     Initial Vitals [12/04/23 0430]   BP Pulse Resp Temp SpO2   (!) 155/85 79 18 98.2 °F (36.8 °C) 99 %      MAP       --         Physical Exam    Nursing note and vitals reviewed.  Constitutional: He appears well-developed and well-nourished. He is not diaphoretic. No distress.   HENT:   Head: Normocephalic and atraumatic.   Blood in nares but no obvious source  No blood in the posterior oropharynx   Eyes: EOM are normal.   Neck: Neck supple.   Normal range of motion.  Cardiovascular:  Normal rate and regular rhythm.           Pulmonary/Chest: No respiratory distress.   Abdominal: He exhibits no distension.   Musculoskeletal:         General: Normal range of motion.      Cervical back: Normal range of motion and neck supple.     Neurological: He is alert and oriented to person, place, and time. GCS score is 15. GCS eye subscore is 4. GCS verbal subscore is 5. GCS motor subscore is 6.   Skin: Skin is warm and dry.   Psychiatric: He has a normal mood and affect. His behavior is normal. Judgment and thought content normal.         ED Course   Procedures  Labs Reviewed   COMPREHENSIVE METABOLIC PANEL - Abnormal; Notable for the following components:       Result Value    CO2 16 (*)     Glucose 222 (*)     All other components within normal limits   CBC W/ AUTO DIFFERENTIAL - Abnormal; Notable for the following components:    RBC 4.14 (*)     Hemoglobin 12.0 (*)     Hematocrit 37.3 (*)     Platelets 129 (*)     MPV 13.4 (*)     All other components within normal limits   HEMOGLOBIN A1C - Abnormal; Notable for the following components:    Hemoglobin A1C 8.5 (*)      Estimated Avg Glucose 197 (*)     All other components within normal limits    Narrative:     Add-on GHGB to [phone] per Khadar Alfonso MD on  11:56    12/04/2023    POCT GLUCOSE - Abnormal; Notable for the following components:    POCT Glucose 228 (*)     All other components within normal limits   PROTIME-INR   APTT   HEMOGLOBIN A1C          Imaging Results    None          Medications   acetaminophen tablet 650 mg (650 mg Oral Not Given 12/4/23 0701)   oxymetazoline 0.05 % nasal spray 2 spray (2 sprays Each Nostril Given 12/4/23 0701)   morphine injection 4 mg (4 mg Intravenous Given 12/4/23 0853)   ondansetron injection 4 mg (4 mg Intravenous Given 12/4/23 0853)   cloNIDine tablet 0.1 mg (0.1 mg Oral Given 12/4/23 0853)   cephALEXin capsule 500 mg (500 mg Oral Given 12/4/23 1100)   metoclopramide HCl injection 10 mg (10 mg Intravenous Given 12/4/23 1137)   morphine injection 2 mg (2 mg Intravenous Given 12/4/23 1644)   oxyCODONE immediate release tablet 5 mg (5 mg Oral Given 12/4/23 1938)     Medical Decision Making  Ddx: anterior nose bleed, posterior nose bleed, anemia , hypertension worsening bleeding  Pt has no active bleeding so no intervention needed on my part  Labs to ensure no significant drop in H/H  Blood pressure was treated  Referred to ENT  Discharged to home in stable condition, return to ED warnings given, follow up and patient care instructions given.        Amount and/or Complexity of Data Reviewed  Labs: ordered.    Risk  OTC drugs.  Prescription drug management.                                      Clinical Impression:  Final diagnoses:  [R04.0] Epistaxis (Primary)  [H92.13] Ear discharge of both ears          ED Disposition Condition    Observation Stable                Marietta Mensah MD  12/09/23 0031

## 2023-12-05 ENCOUNTER — TELEPHONE (OUTPATIENT)
Dept: FAMILY MEDICINE | Facility: CLINIC | Age: 68
End: 2023-12-05
Payer: MEDICARE

## 2023-12-05 ENCOUNTER — PATIENT OUTREACH (OUTPATIENT)
Dept: ADMINISTRATIVE | Facility: CLINIC | Age: 68
End: 2023-12-05
Payer: MEDICARE

## 2023-12-05 NOTE — ED NOTES
Received pt AAO and in NAD.  Pt breathing E/U on room air.  Call bell in reach with bed rails up x2. Pt and family advised of plan of care to include all test and procedures. Patient stable at this time; will continue with plan of care.

## 2023-12-05 NOTE — PROGRESS NOTES
C3 nurse attempted to contact Vick Gonzalez for a TCC post hospital discharge follow up call. No answer. Left voicemail with callback information. The patient does not have a scheduled HOSFU appointment. Message sent to PCP staff for assistance with scheduling visit with patient.

## 2023-12-05 NOTE — PROGRESS NOTES
C3 nurse spoke with Vick Gonzalez for a TCC post hospital discharge follow up call. Scheduled \[facility]\"" appointment with Emanuel Lopez MD on 12/11/23 @ [phone].

## 2023-12-05 NOTE — DISCHARGE SUMMARY
"ED Observation Unit  Discharge Summary        History of Present Illness:    Pt is a 66 yo M who presents with concern for epistaxis that started 2 days ago.  He reports it has been intermittent.  He states he does not recall having anything like this before.  He states he was seen at [facility] [place] and they told him he had "a little scratch."  He states "there is no way a little scratch could cause so much blood."  He is very concerned he has lost much blood.      I reviewed the ED Provider Note dated 12/4/23 prior to my evaluation of this patient.  I reviewed all labs and imaging performed in the Main ED, prior to patient being placed in Observation. Patient was placed in the ED Observation Unit for Epistaxis.    Observation Course:    Pt was placed in EDOU for epistaxis. Pt hemodynamically stable. H/H 12.0/37.3. Pt evaluated by ENT and was given rhinorocket w/ instructions to maintain for 3-5 days. Pt had extremely poor tolerance of rhinorocket 2/2 intense pressure which induced severe headaches, and acute distress and tooth pain. Pt was treated w/ IM reglan, tylenol, IV morphine and rhinorocket was slightly decompress per ENT w/o improvement of pain. Pain was adequately controlled w/ oxycodone 5 mg. No s/s of rebleed while admitted. Given nature of pain and duration of Pt was given short course of tramadol at discharge. Pt educated on ENT discharge instructions. He was started on PO keflex for staph coverage w/ nasal packing in place to be continued at discharge. Pt med rec'd and medications were sent to Pt's preferred pharmacy prior to discharge. Pt medically ready for discharge home. Pt educated on hospital course and plan, verbally agrees and understands. All questions answered.          Consultants:    ENT    Final Diagnosis:  Epistaxis    Discharge Condition: Good    Disposition: Home or Self Care     Time spent on the discharge of the patient including review of hospital course with the patient. " reviewing discharge medications and arranging follow-up care 35 minutes.  Patient was seen and examined on the date of discharge and determined to be suitable for discharge.    Follow Up:  Future Appointments   Date Time Provider Department Center   12/8/2023 10:00 AM Harman Pena MD [facility]NS[facility]   2/26/2024 11:15 AM LAB, [facility] [place]   3/4/2024 10:15 AM [facility]US[facility]       ENT instructions as follows:  - DO NOT blow your nose for 2 weeks. The only exception is that you may lightly blow your nose after using a sinus rinse.  - Sneeze/cough with mouth open  - Avoid irritating substances that might make you sneeze, such as dust, chalk, harsh chemicals, and allergic triggers. This might also include spicy foods.  - Do not smoke   - Avoid flying or swimming for 2 weeks.    - Avoid all heavy lifting, straining or bending for 2 weeks.   - Avoid semi-contact sports or vigorous exercising for 3-4 weeks.       Aftercare/ moisturizing recommendations to decrease frequency of nosebleeds:  - Daily nasal saline sprays/rinses  - Nightly application of vaseline/aquaphor to anterior nares  - Room humidification  - Avoidance of nasal cannula if possible (always with humidification and please use face tent, nasal cup, facemask if possible)  Management of medical conditions contributing to epistaxis (coagulopathy, hypertension, etc.)  - Humidification of CPAP appliance if applicable     If rebleeding occurs:   - Apply Afrin liberally to both nostrils  - Apply pressure over the soft part of the nose for 15 minutes (clip or digital pressure, important to have pressure over soft part of nose and consistent pressure (do not release pressure at any point))  - Instruct patient to lean forward, avoid swallowing blood. This can cause nausea and vomiting  - Can repeat these steps above up to 3 times  - Call/reconsult ENT or return to the nearest emergency department if this is  unsuccessful

## 2023-12-08 ENCOUNTER — OFFICE VISIT (OUTPATIENT)
Dept: OTOLARYNGOLOGY | Facility: CLINIC | Age: 68
End: 2023-12-08
Payer: MEDICARE

## 2023-12-08 VITALS
BODY MASS INDEX: 33.02 KG/M2 | WEIGHT: 257.25 LBS | HEIGHT: 74 IN | SYSTOLIC BLOOD PRESSURE: 138 MMHG | DIASTOLIC BLOOD PRESSURE: 65 MMHG | HEART RATE: 84 BPM

## 2023-12-08 DIAGNOSIS — R04.0 EPISTAXIS: ICD-10-CM

## 2023-12-08 DIAGNOSIS — H92.13 EAR DISCHARGE OF BOTH EARS: ICD-10-CM

## 2023-12-08 PROCEDURE — 99203 PR OFFICE/OUTPT VISIT, NEW, LEVL III, 30-44 MIN: ICD-10-PCS | Mod: 25,S$GLB,, | Performed by: STUDENT IN AN ORGANIZED HEALTH CARE EDUCATION/TRAINING PROGRAM

## 2023-12-08 PROCEDURE — 4010F ACE/ARB THERAPY RXD/TAKEN: CPT | Mod: CPTII,S$GLB,, | Performed by: STUDENT IN AN ORGANIZED HEALTH CARE EDUCATION/TRAINING PROGRAM

## 2023-12-08 PROCEDURE — 3078F PR MOST RECENT DIASTOLIC BLOOD PRESSURE < 80 MM HG: ICD-10-PCS | Mod: CPTII,S$GLB,, | Performed by: STUDENT IN AN ORGANIZED HEALTH CARE EDUCATION/TRAINING PROGRAM

## 2023-12-08 PROCEDURE — 3075F PR MOST RECENT SYSTOLIC BLOOD PRESS GE 130-139MM HG: ICD-10-PCS | Mod: CPTII,S$GLB,, | Performed by: STUDENT IN AN ORGANIZED HEALTH CARE EDUCATION/TRAINING PROGRAM

## 2023-12-08 PROCEDURE — 3052F PR MOST RECENT HEMOGLOBIN A1C LEVEL 8.0 - < 9.0%: ICD-10-PCS | Mod: CPTII,S$GLB,, | Performed by: STUDENT IN AN ORGANIZED HEALTH CARE EDUCATION/TRAINING PROGRAM

## 2023-12-08 PROCEDURE — 31231 NASAL ENDOSCOPY DX: CPT | Mod: S$GLB,,, | Performed by: STUDENT IN AN ORGANIZED HEALTH CARE EDUCATION/TRAINING PROGRAM

## 2023-12-08 PROCEDURE — 3008F BODY MASS INDEX DOCD: CPT | Mod: CPTII,S$GLB,, | Performed by: STUDENT IN AN ORGANIZED HEALTH CARE EDUCATION/TRAINING PROGRAM

## 2023-12-08 PROCEDURE — 3008F PR BODY MASS INDEX (BMI) DOCUMENTED: ICD-10-PCS | Mod: CPTII,S$GLB,, | Performed by: STUDENT IN AN ORGANIZED HEALTH CARE EDUCATION/TRAINING PROGRAM

## 2023-12-08 PROCEDURE — 99999 PR PBB SHADOW E&M-EST. PATIENT-LVL IV: ICD-10-PCS | Mod: PBBFAC,,, | Performed by: STUDENT IN AN ORGANIZED HEALTH CARE EDUCATION/TRAINING PROGRAM

## 2023-12-08 PROCEDURE — 1159F MED LIST DOCD IN RCRD: CPT | Mod: CPTII,S$GLB,, | Performed by: STUDENT IN AN ORGANIZED HEALTH CARE EDUCATION/TRAINING PROGRAM

## 2023-12-08 PROCEDURE — 3078F DIAST BP <80 MM HG: CPT | Mod: CPTII,S$GLB,, | Performed by: STUDENT IN AN ORGANIZED HEALTH CARE EDUCATION/TRAINING PROGRAM

## 2023-12-08 PROCEDURE — 99999 PR PBB SHADOW E&M-EST. PATIENT-LVL IV: CPT | Mod: PBBFAC,,, | Performed by: STUDENT IN AN ORGANIZED HEALTH CARE EDUCATION/TRAINING PROGRAM

## 2023-12-08 PROCEDURE — 1160F PR REVIEW ALL MEDS BY PRESCRIBER/CLIN PHARMACIST DOCUMENTED: ICD-10-PCS | Mod: CPTII,S$GLB,, | Performed by: STUDENT IN AN ORGANIZED HEALTH CARE EDUCATION/TRAINING PROGRAM

## 2023-12-08 PROCEDURE — 1159F PR MEDICATION LIST DOCUMENTED IN MEDICAL RECORD: ICD-10-PCS | Mod: CPTII,S$GLB,, | Performed by: STUDENT IN AN ORGANIZED HEALTH CARE EDUCATION/TRAINING PROGRAM

## 2023-12-08 PROCEDURE — 31231 PR NASAL ENDOSCOPY, DX: ICD-10-PCS | Mod: S$GLB,,, | Performed by: STUDENT IN AN ORGANIZED HEALTH CARE EDUCATION/TRAINING PROGRAM

## 2023-12-08 PROCEDURE — 4010F PR ACE/ARB THEARPY RXD/TAKEN: ICD-10-PCS | Mod: CPTII,S$GLB,, | Performed by: STUDENT IN AN ORGANIZED HEALTH CARE EDUCATION/TRAINING PROGRAM

## 2023-12-08 PROCEDURE — 99203 OFFICE O/P NEW LOW 30 MIN: CPT | Mod: 25,S$GLB,, | Performed by: STUDENT IN AN ORGANIZED HEALTH CARE EDUCATION/TRAINING PROGRAM

## 2023-12-08 PROCEDURE — 1125F AMNT PAIN NOTED PAIN PRSNT: CPT | Mod: CPTII,S$GLB,, | Performed by: STUDENT IN AN ORGANIZED HEALTH CARE EDUCATION/TRAINING PROGRAM

## 2023-12-08 PROCEDURE — 3075F SYST BP GE 130 - 139MM HG: CPT | Mod: CPTII,S$GLB,, | Performed by: STUDENT IN AN ORGANIZED HEALTH CARE EDUCATION/TRAINING PROGRAM

## 2023-12-08 PROCEDURE — 1125F PR PAIN SEVERITY QUANTIFIED, PAIN PRESENT: ICD-10-PCS | Mod: CPTII,S$GLB,, | Performed by: STUDENT IN AN ORGANIZED HEALTH CARE EDUCATION/TRAINING PROGRAM

## 2023-12-08 PROCEDURE — 3052F HG A1C>EQUAL 8.0%<EQUAL 9.0%: CPT | Mod: CPTII,S$GLB,, | Performed by: STUDENT IN AN ORGANIZED HEALTH CARE EDUCATION/TRAINING PROGRAM

## 2023-12-08 PROCEDURE — 1160F RVW MEDS BY RX/DR IN RCRD: CPT | Mod: CPTII,S$GLB,, | Performed by: STUDENT IN AN ORGANIZED HEALTH CARE EDUCATION/TRAINING PROGRAM

## 2023-12-08 NOTE — PROGRESS NOTES
Subjective:     Vick Gonzalez is a 67 y.o. male who was referred to me by Jhoan Self in consultation for nosebleeds.    He has not previously had nasal cauterization.  The bleeding has required packing for control, last seen in ED on 12/4.  The last episode was 4 days ago, and is not currently active.  There is not a prior history of nasal surgery.  There is not a prior history of nasal trauma.  There is not a history of environmental allergies.  He does not currently use a nasal spray.  There is not a family history to suggest a clotting disorder.  He does currently take a blood-thinning agent, ASA 81mg.    He was seen in the ED on 21/1 and 12/4 for episodic left sided bleeding. RR was placed in the ED on 12/4. Denies any previous nasal trauma or episodes of epistaxis.     Past Medical History  He has a past medical history of Acute congestive heart failure, Anemia, Anxiety, Cataract, Chronic pain syndrome, CKD (chronic kidney disease), stage III, Coronary artery disease involving native coronary artery of native heart with angina pectoris, Diabetes mellitus type II, uncontrolled, Discitis of lumbosacral region, ED (erectile dysfunction), Encounter for blood transfusion, Genital herpes, Gout, arthritis, History of alcohol abuse, History of hepatitis C, s/p successful Rx w/ SVR24 (cure) - 5/2018, History of positive PPD, treatment status unknown, History of substance abuse, Hypertension, Hypothyroidism, Liver replaced by transplant, Pancreatitis, Peptic ulcer disease, and Pulmonary emphysema, unspecified emphysema type.    Past Surgical History  He has a past surgical history that includes Liver transplant (06/2010); Cholecystectomy; Spine surgery; Injection of joint (Right, 12/2/2019); Carpal tunnel release (Left, 10/29/2021); Anterior cervical discectomy w/ fusion (N/A, 8/22/2022); Decompression of cervical spine by anterior approach with fusion (8/22/2022); Transforaminal epidural injection of  "steroid (Left, 5/29/2023); Left heart catheterization (Left, 8/2/2023); and Coronary angiography (N/A, 8/2/2023).    Family History  His family history includes Cancer in his mother; Cancer (age of onset: 82) in his maternal uncle; Diabetes in his mother and sister; Drug abuse in his daughter; Heart disease in his mother.    Social History  He reports that he has quit smoking. He has never been exposed to tobacco smoke. He has never used smokeless tobacco. He reports that he does not currently use drugs. He reports that he does not drink alcohol.    Allergies  He has No Known Allergies.    Medications  He has a current medication list which includes the following prescription(s): albuterol, allopurinol, aluminum-magnesium hydroxide-simethicone, aspirin, atorvastatin, calcium carbonate-vitamin d3, clopidogrel, cyanocobalamin, diphenhydramine, gabapentin, toujeo max u-300 solostar, insulin lispro, isosorbide-hydralazine 20-37.5 mg, lancets, levothyroxine, metoprolol succinate, men's multivitamin gummies, novofine plus, papaverine, entresto, tacrolimus, tramadol, trazodone, true metrix glucose test strip, blood-glucose meter, dexcom g7 , dexcom g7 sensor, dexcom g6 transmitter, cephalexin, furosemide, and [DISCONTINUED] insulin aspart u-100.    Review of Systems       Objective:     /65 (BP Location: Left arm, Patient Position: Sitting, BP Method: Large (Automatic))   Pulse 84   Ht 6' 2" (1.88 m)   Wt 116.7 kg (257 lb 4.4 oz)   BMI 33.03 kg/m²      Constitutional:   Vital signs are normal. He appears well-developed and well-nourished.     Head:  Normocephalic and atraumatic.     Ears:  Hearing normal to normal and whispered voice; external ear normal without scars, lesions, or masses; ear canal, tympanic membrane, and middle ear normal..   Right Ear: No swelling. Tympanic membrane is not perforated and not bulging. No middle ear effusion.   Left Ear: No swelling. Tympanic membrane is not perforated " and not bulging.  No middle ear effusion.     Nose:  Nose normal including turbinates, nasal mucosa, sinuses and nasal septum. No epistaxis.         Mouth/Throat  Oropharynx clear and moist without lesions or asymmetry and normal uvula midline. Normal dentition. No tonsillar abscesses. Tonsillar exudate.      Neck:  Neck normal without thyromegaly masses, asymmetry, normal tracheal structure, crepitus, and tenderness, thyroid normal, trachea normal, phonation normal, full range of motion with neck supple and no adenopathy. No stridor present.        Head (right side): No submental adenopathy present.        Head (left side): No submental adenopathy present.     He has no cervical adenopathy.     Pulmonary/Chest:   No stridor.       Procedure    Nasal endoscopy performed.  See procedure note.    Nasal Endoscopy:  12/8/2023    The use of diagnostic nasal endoscopy was considered medically necessary for the evaluation and visualization of the nasal anatomy for symptoms suggestive of nasal or sinus origin. Physical examination (including a nasal speculum evaluation) did not provide sufficient clinical information to establish a diagnosis, or symptoms did not improve or worsened following treatment.     The nasal cavity was decongested with topical 1% phenylephrine and anesthetized with 4% lidocaine.  A rigid 0-degree endoscope was introduced into the nasal cavity.    The patient was seated in the examination chair. After discussion of risks and benefits, a nasal endoscope was inserted into the nose the endoscope was passed along the left nasal floor to the nasopharynx. It was then passed between the middle and superior meatus, nasal turbinates, nasal septum, nasopharynx and sphenoethmoid region. The nasal endoscope was withdrawn and there was no complications. An identical procedure was performed on the right side. I was present for the entire procedure.The patient tolerated the above procedure well. The findings of  this procedure can be found in the dictated note from 12/8/2023 visit.        RR removed. Some minimal mucosal trauma along the septum and IT. Fibrillar placed to assist with healing process.     Data Reviewed    Hemoglobin (g/dL)   Date Value   12/04/2023 12.0 (L)     POC Hematocrit (%PCV)   Date Value   02/27/2017 39     Hematocrit (%)   Date Value   12/04/2023 37.3 (L)     Platelets (K/uL)   Date Value   12/04/2023 129 (L)     Prothrombin Time (sec)   Date Value   12/04/2023 10.8     aPTT (sec)   Date Value   12/04/2023 23.3     INR (no units)   Date Value   12/04/2023 1.0          Assessment:     1. Epistaxis    2. Ear discharge of both ears      Plan:     I had a long discussion with the patient and his wife  regarding his condition and the further workup and management options.      Do not blow nose for 1 week.  Sneeze with mouth open.  Do not take ibuprofen or aspirin for 1 week.  Place saline nasal gel in nostrils at bedtime.  May use saline nose drops during the day to prevent dryness.  If bleeding recurs, use oxymetazoline (Afrin) spray in the nostril and call for follow-up appointment.    Harman Pena MD

## 2023-12-11 ENCOUNTER — TELEPHONE (OUTPATIENT)
Dept: NEUROSURGERY | Facility: CLINIC | Age: 68
End: 2023-12-11
Payer: MEDICARE

## 2023-12-11 ENCOUNTER — OFFICE VISIT (OUTPATIENT)
Dept: FAMILY MEDICINE | Facility: CLINIC | Age: 68
End: 2023-12-11
Payer: MEDICARE

## 2023-12-11 ENCOUNTER — TELEPHONE (OUTPATIENT)
Dept: FAMILY MEDICINE | Facility: CLINIC | Age: 68
End: 2023-12-11

## 2023-12-11 VITALS
SYSTOLIC BLOOD PRESSURE: 122 MMHG | BODY MASS INDEX: 33.02 KG/M2 | DIASTOLIC BLOOD PRESSURE: 68 MMHG | HEIGHT: 74 IN | OXYGEN SATURATION: 98 % | WEIGHT: 257.25 LBS | TEMPERATURE: 98 F | HEART RATE: 71 BPM

## 2023-12-11 DIAGNOSIS — R26.89 BALANCE PROBLEM: ICD-10-CM

## 2023-12-11 DIAGNOSIS — F19.10 SUBSTANCE ABUSE: ICD-10-CM

## 2023-12-11 DIAGNOSIS — Z98.1 S/P CERVICAL SPINAL FUSION: Primary | ICD-10-CM

## 2023-12-11 DIAGNOSIS — Z94.4 S/P LIVER TRANSPLANT: Chronic | ICD-10-CM

## 2023-12-11 DIAGNOSIS — Z79.4 TYPE 2 DIABETES MELLITUS WITH DIABETIC POLYNEUROPATHY, WITH LONG-TERM CURRENT USE OF INSULIN: ICD-10-CM

## 2023-12-11 DIAGNOSIS — E11.42 TYPE 2 DIABETES MELLITUS WITH DIABETIC POLYNEUROPATHY, WITH LONG-TERM CURRENT USE OF INSULIN: ICD-10-CM

## 2023-12-11 DIAGNOSIS — I10 HYPERTENSION, UNSPECIFIED TYPE: ICD-10-CM

## 2023-12-11 DIAGNOSIS — G44.89 OTHER HEADACHE SYNDROME: Primary | ICD-10-CM

## 2023-12-11 DIAGNOSIS — R04.0 EPISTAXIS: ICD-10-CM

## 2023-12-11 DIAGNOSIS — G63 POLYNEUROPATHY ASSOCIATED WITH UNDERLYING DISEASE: ICD-10-CM

## 2023-12-11 PROCEDURE — 99999 PR PBB SHADOW E&M-EST. PATIENT-LVL III: CPT | Mod: PBBFAC,,, | Performed by: FAMILY MEDICINE

## 2023-12-11 PROCEDURE — 99496 TRANSJ CARE MGMT HIGH F2F 7D: CPT | Mod: S$GLB,,, | Performed by: FAMILY MEDICINE

## 2023-12-11 PROCEDURE — 3052F PR MOST RECENT HEMOGLOBIN A1C LEVEL 8.0 - < 9.0%: ICD-10-PCS | Mod: CPTII,S$GLB,, | Performed by: FAMILY MEDICINE

## 2023-12-11 PROCEDURE — 3074F SYST BP LT 130 MM HG: CPT | Mod: CPTII,S$GLB,, | Performed by: FAMILY MEDICINE

## 2023-12-11 PROCEDURE — 3288F PR FALLS RISK ASSESSMENT DOCUMENTED: ICD-10-PCS | Mod: CPTII,S$GLB,, | Performed by: FAMILY MEDICINE

## 2023-12-11 PROCEDURE — 99499 UNLISTED E&M SERVICE: CPT | Mod: S$GLB,,, | Performed by: FAMILY MEDICINE

## 2023-12-11 PROCEDURE — 1125F AMNT PAIN NOTED PAIN PRSNT: CPT | Mod: CPTII,S$GLB,, | Performed by: FAMILY MEDICINE

## 2023-12-11 PROCEDURE — 3288F FALL RISK ASSESSMENT DOCD: CPT | Mod: CPTII,S$GLB,, | Performed by: FAMILY MEDICINE

## 2023-12-11 PROCEDURE — 1159F MED LIST DOCD IN RCRD: CPT | Mod: CPTII,S$GLB,, | Performed by: FAMILY MEDICINE

## 2023-12-11 PROCEDURE — 1101F PT FALLS ASSESS-DOCD LE1/YR: CPT | Mod: CPTII,S$GLB,, | Performed by: FAMILY MEDICINE

## 2023-12-11 PROCEDURE — 3052F HG A1C>EQUAL 8.0%<EQUAL 9.0%: CPT | Mod: CPTII,S$GLB,, | Performed by: FAMILY MEDICINE

## 2023-12-11 PROCEDURE — 3078F PR MOST RECENT DIASTOLIC BLOOD PRESSURE < 80 MM HG: ICD-10-PCS | Mod: CPTII,S$GLB,, | Performed by: FAMILY MEDICINE

## 2023-12-11 PROCEDURE — 1125F PR PAIN SEVERITY QUANTIFIED, PAIN PRESENT: ICD-10-PCS | Mod: CPTII,S$GLB,, | Performed by: FAMILY MEDICINE

## 2023-12-11 PROCEDURE — 4010F PR ACE/ARB THEARPY RXD/TAKEN: ICD-10-PCS | Mod: CPTII,S$GLB,, | Performed by: FAMILY MEDICINE

## 2023-12-11 PROCEDURE — 4010F ACE/ARB THERAPY RXD/TAKEN: CPT | Mod: CPTII,S$GLB,, | Performed by: FAMILY MEDICINE

## 2023-12-11 PROCEDURE — 3078F DIAST BP <80 MM HG: CPT | Mod: CPTII,S$GLB,, | Performed by: FAMILY MEDICINE

## 2023-12-11 PROCEDURE — 99999 PR PBB SHADOW E&M-EST. PATIENT-LVL III: ICD-10-PCS | Mod: PBBFAC,,, | Performed by: FAMILY MEDICINE

## 2023-12-11 PROCEDURE — 99496 TRANSITIONAL CARE MANAGE SERVICE 7 DAY DISCHARGE: ICD-10-PCS | Mod: S$GLB,,, | Performed by: FAMILY MEDICINE

## 2023-12-11 PROCEDURE — 1101F PR PT FALLS ASSESS DOC 0-1 FALLS W/OUT INJ PAST YR: ICD-10-PCS | Mod: CPTII,S$GLB,, | Performed by: FAMILY MEDICINE

## 2023-12-11 PROCEDURE — 1159F PR MEDICATION LIST DOCUMENTED IN MEDICAL RECORD: ICD-10-PCS | Mod: CPTII,S$GLB,, | Performed by: FAMILY MEDICINE

## 2023-12-11 PROCEDURE — 3074F PR MOST RECENT SYSTOLIC BLOOD PRESSURE < 130 MM HG: ICD-10-PCS | Mod: CPTII,S$GLB,, | Performed by: FAMILY MEDICINE

## 2023-12-11 RX ORDER — TIRZEPATIDE 2.5 MG/.5ML
2.5 INJECTION, SOLUTION SUBCUTANEOUS
Qty: 4 PEN | Refills: 11 | Status: SHIPPED | OUTPATIENT
Start: 2023-12-11

## 2023-12-11 RX ORDER — BLOOD-GLUCOSE SENSOR
EACH MISCELLANEOUS
Qty: 3 EACH | Refills: 11 | Status: SHIPPED | OUTPATIENT
Start: 2023-12-11

## 2023-12-11 RX ORDER — BLOOD-GLUCOSE,RECEIVER,CONT
EACH MISCELLANEOUS
Qty: 1 EACH | Refills: 11 | Status: SHIPPED | OUTPATIENT
Start: 2023-12-11

## 2023-12-11 NOTE — PROGRESS NOTES
Transitional Care Note  Subjective:       Patient ID: Vick Gonzalez is a 67 y.o. male.  Chief Complaint: Hospital Follow Up (Headaches and nose bleeds, continues after hospital visit)    Family and/or Caretaker present at visit?  No.  Diagnostic tests reviewed/disposition: I have reviewed all completed as well as pending diagnostic tests at the time of discharge.  Disease/illness education: y  Home health/community services discussion/referrals: Patient does not have home health established from hospital visit.  They do not need home health.  If needed, we will set up home health for the patient.   Establishment or re-establishment of referral orders for community resources: No other necessary community resources.   Discussion with other health care providers: No discussion with other health care providers necessary.   HPI  Review of Systems    Objective:      Physical Exam    Assessment:       1. Other headache syndrome    2. Epistaxis    3. Balance problem    4. Type 2 diabetes mellitus with diabetic polyneuropathy, with long-term current use of insulin    5. Hypertension, unspecified type    6. Substance abuse    7. S/P liver transplant    8. Polyneuropathy associated with underlying disease        Plan:         (Portions of this note were dictated using voice recognition software and may contain dictation related errors in spelling/grammar/syntax not found on text review)    CC:   Chief Complaint   Patient presents with    Hospital Follow Up     Headaches and nose bleeds, continues after hospital visit       HPI: 67 y.o. male seen in ED on 12/01/2023 and 12/04/2023 for epistaxis left-sided.  On initial evaluation bleeding had resolved prior to arrival.  Exam fairly unremarkable, was advised to return p.r.n. Went back on 12/04/2023.  /85.  Had Afrin instilled, packing done. Labs did not show any significant drop in hemoglobin.  BP treated with clonidine and hydralazine.  Discharged with ENT appointment.   Saw ENT on 12/08/2023.  Nasal endoscopy showed some minimal mucosal trauma along septum inferior turbinate, fibular placed to assist with healing process.  Advised on saline nasal gel and nostrils, do not take ibuprofen or aspirin for week, do not blow nose for week, saline drops during the day as needed, return p.r.n.    States that he has been getting headaches as well, typically on vertex and occipital.  This started around the time of the nosebleeds.  Blood pressure today stable 122/68.  Compliant with medications      Other chronic medical history     CHF prompting hospitalization in May 2023  Started on isosorbide hydralazine 20/37.5 t.i.d.  Prescribed furosemide 40 mg p.r.n. for weight gain greater than 2-3 lb in 1 day or 4-6 lb over 1 week, has not needed  Was on lisinopril but this was changed in favor of Entresto 49/51 b.i.d. after cardiology visit 07/24/2023  metoprolol 200 mg daily   Was on nifedipine prior but currently not taking  At last visit had mentioned significant sodium indiscretion prior to admission and drinking 6 energy drinks a day with the equivalent of 780 mg of caffeine, this was stopped after admission     Echo 05/15/2023   Technically difficult study  The left ventricle is normal in size with concentric remodeling and normal systolic function. The estimated ejection fraction is 60%.  Normal right ventricular size with normal right ventricular systolic function.  Normal left ventricular diastolic function.  Intermediate central venous pressure (8 mmHg).  There is no evidence of intracardiac shunting.         NSTEMI hospitalized 08/02/2023, discharged 08/03/2023.  UDS was positive for cocaine and opiates.  Was given nitroglycerin and heparin infusions, seen in consultation with Cardiology, left heart catheterization showed nonobstructive coronary artery disease, no stents placed.  Advise medical management with aspirin/Plavix for 1 here and high-intensity statin therapy     Chronic pain,  followed by pain management.  Was prior On oxycodone along with his gabapentin.  However failed drug test with positive cocaine and Soma.  History of cocaine abuse,     pain management had recommended continuing with gabapentin and interventional management if desired.        Neuro surgery following for cervical disc disease, Had cervical spine fusion surgery August 2022.  Followed up with neuro surgery and was having some worsening left upper extremity pain.  EMG showed chronic left C7 radiculopathy and left ulnar neuropathy across the elbow.  Last neuro surgery visit was 08/01/2023.  Was planning for cervical spine surgery given radiculopathic/myelopathic symptoms September 25th although in the meantime was hospitalized for NSTEMI above .  Looks like surgery was canceled.  Has not followed back up with neuro surgery since.  Continuing to be frustrated by balance issues and left upper extremity weakness    Diabetes with peripheral neuropathy:  Complicated by  dietary noncompliance now followed by endocrinology (last seen January 2022), was on Trulicity but not currently taking.  His current regimen of diabetes was adjusted to glargine (toujeo) 40 units, NovoLog to up to 20 units t.i.d. with meals.  Was believed that a lot of his hyperglycemia issues prior were relating to dietary and insulin noncompliance.   .  A1c has increased to 9.7 (aug 2022).  However, recently states that he has been checking his sugars and is getting usually readings around 90s in the morning and 130s to 140s if taken 2 hours postprandial.  Compliant with the above medication therapy.  Concerned about weight gain was if he can take something lose weight.  Was on G LP 1 agonist therapy but I believe it was  advised stay off this given history of pancreatitis (seems be related to alcohol use in the past but does not drink longer) .       Dyslipidemia on Crestor 5mg daily.     Hypothyroidism:  Synthroid 88mcg.       Anxiety on Zoloft 100 mg  daily.       Liver Transplant secondary to end-stage liver disease hepatitis C and prior alcohol abuse.:  Followed by hepatology.  On Prograf.   fibroscan showed stage II fibrosis         Past Medical History:   Diagnosis Date    Acute congestive heart failure 5/12/2023    Anemia     Anxiety     Cataract     Chronic pain syndrome 07/13/2011    CKD (chronic kidney disease), stage III     Coronary artery disease involving native coronary artery of native heart with angina pectoris 8/24/2023    Diabetes mellitus type II, uncontrolled     Discitis of lumbosacral region 01/16/2015    ED (erectile dysfunction)     Encounter for blood transfusion     Genital herpes     Gout, arthritis     History of alcohol abuse     History of hepatitis C, s/p successful Rx w/ SVR24 (cure) - 5/2018 08/23/2011    Completed 24wks Epclusa + RBV w/SVR12 - 2/2018  -     History of positive PPD, treatment status unknown     Pulmonary granulomas, negative sputum cultures for AFB and indeterminate quantferon test    History of substance abuse     Hypertension     Hypothyroidism     Liver replaced by transplant 08/23/2011    DATE: 12/16/2013  LIVER BIOPSY : REASON:  hep C staging  PATHOLOGY COMMITTEE NOTE/PLAN: :grade  1 / stage 1        Pancreatitis 2016    Peptic ulcer disease     Pulmonary emphysema, unspecified emphysema type 5/26/2023       Past Surgical History:   Procedure Laterality Date    ANTERIOR CERVICAL DISCECTOMY W/ FUSION N/A 8/22/2022    Procedure: Procedure: ACDF 4-7 LOS: 4.0 Anesthesia: General Blood: Type & Screen Radiology: C-Arm Microscope: ------- SNS: EMG, SEP, MEP Brace: [place] Bed: Regular Bed, Shoulder Strap Headrest:------ Position: Supine Equipment: [employer];  Surgeon: Titi Christian MD;  Location: [facility];  Service: Neurosurgery;  Laterality: N/A;  Depuy  confirmed CW 8/19  Neuro monitoring confirmed CW 8/19    CARPAL TUNNEL RELEASE Left 10/29/2021    Procedure: RELEASE, CARPAL TUNNEL;  Surgeon: Jameson Alejo Jr., MD;   Location: [facility] OR;  Service: Orthopedics;  Laterality: Left;    CHOLECYSTECTOMY      CORONARY ANGIOGRAPHY N/A 8/2/2023    Procedure: ANGIOGRAM, CORONARY ARTERY;  Surgeon: Juan Vera MD;  Location: [facility] CATH LAB/EP;  Service: Cardiology;  Laterality: N/A;    DECOMPRESSION OF CERVICAL SPINE BY ANTERIOR APPROACH WITH FUSION  8/22/2022    Procedure: DECOMPRESSION AND FUSION, SPINE, CERVICAL, ANTERIOR APPROACH;  Surgeon: Titi Christian MD;  Location: [facility] OR;  Service: Neurosurgery;;    INJECTION OF JOINT Right 12/2/2019    Procedure: INJECTION, JOINT SI;  Surgeon: Thu Penn MD;  Location: [facility] PAIN MGT;  Service: Pain Management;  Laterality: Right;  RT SI JNT INJ    LEFT HEART CATHETERIZATION Left 8/2/2023    Procedure: Left heart cath;  Surgeon: Juan Vera MD;  Location: [facility] CATH LAB/EP;  Service: Cardiology;  Laterality: Left;    LIVER TRANSPLANT  06/2010    SPINE SURGERY      TRANSFORAMINAL EPIDURAL INJECTION OF STEROID Left 5/29/2023    Procedure: INJECTION, STEROID, EPIDURAL, TRANSFORAMINAL APPROACH,  LEFT C7-T1 DIRECT REF;  Surgeon: Thu Penn MD;  Location: [facility] PAIN MGT;  Service: Pain Management;  Laterality: Left;  4/3 MD ILL/PT WILL C/B       Family History   Problem Relation Age of Onset    Cancer Mother     Diabetes Mother     Heart disease Mother     Diabetes Sister     Cancer Maternal Uncle 82        colon CA    Drug abuse Daughter     Melanoma Neg Hx     Psoriasis Neg Hx     Lupus Neg Hx     Eczema Neg Hx     Acne Neg Hx        Social History     Tobacco Use    Smoking status: Former     Passive exposure: Never    Smokeless tobacco: Never   Substance Use Topics    Alcohol use: No     Comment: over 5 years ago, none currently    Drug use: Not Currently     Comment: Former cocaine use       Lab Results   Component Value Date    WBC 8.38 12/04/2023    HGB 12.0 (L) 12/04/2023    HCT 37.3 (L) 12/04/2023    MCV 90 12/04/2023     (L) 12/04/2023    CHOL 143 08/03/2023    TRIG 234 (H)  "08/03/2023    HDL 21 (L) 08/03/2023    ALT 18 12/04/2023    AST 18 12/04/2023    BILITOT 0.4 12/04/2023    ALKPHOS 88 12/04/2023     12/04/2023    K 5.0 12/04/2023     12/04/2023    CREATININE 1.1 12/04/2023    ESTGFRAFRICA >60.0 07/25/2022    EGFRNONAA 52.7 (A) 07/25/2022    EGFRNORACEVR >60.0 12/04/2023    CALCIUM 8.7 12/04/2023    ALBUMIN 3.8 12/04/2023    BUN 14 12/04/2023    CO2 16 (L) 12/04/2023    TSH 1.288 05/13/2023    PSA 0.22 01/19/2022    PSADIAG 0.28 10/09/2017    INR 1.0 12/04/2023    HGBA1C 8.5 (H) 12/04/2023    MICALBCREAT 30.8 (H) 08/16/2022    LDLCALC 75.2 08/03/2023     (H) 12/04/2023    YWSIGEVN13LU 30 10/11/2021         Hemoglobin (g/dL)   Date Value   12/04/2023 12.0 (L)   11/27/2023 12.7 (L)   08/21/2023 13.1 (L)   08/03/2023 11.8 (L)   08/02/2023 12.4 (L)   05/31/2023 11.9 (L)   05/12/2023 11.7 (L)   04/24/2023 12.2 (L)   01/23/2023 13.1 (L)   12/05/2022 12.0 (L)     Platelets (K/uL)   Date Value   12/04/2023 129 (L)   11/27/2023 143 (L)   08/21/2023 149 (L)   08/03/2023 133 (L)   08/02/2023 154   05/31/2023 168   05/12/2023 133 (L)   04/24/2023 149 (L)   01/23/2023 122 (L)   12/05/2022 137 (L)     Hemoglobin A1C (%)   Date Value   12/04/2023 8.5 (H)   05/13/2023 6.8 (H)   12/05/2022 7.8 (H)   08/16/2022 9.7 (H)   01/25/2022 7.4 (H)   01/24/2022 7.4 (H)   10/11/2021 10.1 (H)   11/11/2020 9.1 (H)   09/01/2020 11.9 (H)   01/23/2020 8.7 (H)             Vital signs reviewed  Vitals:    12/11/23 1329   BP: 122/68   BP Location: Right arm   Patient Position: Sitting   BP Method: Medium (Manual)   Pulse: 71   Temp: 98 °F (36.7 °C)   TempSrc: Oral   SpO2: 98%   Weight: 116.7 kg (257 lb 4.4 oz)   Height: 6' 2" (1.88 m)       Wt Readings from Last 4 Encounters:   12/11/23 116.7 kg (257 lb 4.4 oz)   12/08/23 116.7 kg (257 lb 4.4 oz)   12/04/23 118.4 kg (261 lb)   12/01/23 118.4 kg (261 lb)       PE:   APPEARANCE: Well nourished, well developed, in no acute distress.    HEAD: " "Normocephalic, atraumatic.  EYES: PERRL. EOMI.   Conjunctivae noninjected.  EARS: TM's intact. Light reflex normal. No retraction or perforation  NOSE: Mucosa pink. Airway clear.  MOUTH & THROAT: No tonsillar enlargement. No pharyngeal erythema or exudate.   NECK: Supple with no cervical lymphadenopathy.    CHEST: Good inspiratory effort. Lungs clear to auscultation with no wheezes or crackles.  CARDIOVASCULAR: Normal S1, S2. No rubs, murmurs, or gallops.  ABDOMEN: Bowel sounds normal. Not distended. Soft. No tenderness or masses. No organomegaly.  EXTREMITIES: No edema       IMPRESSION  1. Other headache syndrome    2. Epistaxis    3. Balance problem    4. Type 2 diabetes mellitus with diabetic polyneuropathy, with long-term current use of insulin    5. Hypertension, unspecified type    6. Substance abuse    7. S/P liver transplant    8. Polyneuropathy associated with underlying disease            PLAN  Orders Placed This Encounter   Procedures    CT Head Without Contrast     Medications Ordered This Encounter   Medications    blood-glucose meter,continuous (DEXCOM G7 ) Misc     Sig: Use as directed.     Dispense:  1 each     Refill:  11    blood-glucose sensor (DEXCOM G7 SENSOR) Viviane     Sig: Use as directed.     Dispense:  3 each     Refill:  11    tirzepatide (MOUNJARO) 2.5 mg/0.5 mL PnIj     Sig: Inject 2.5 mg into the skin every 7 days.     Dispense:  4 Pen     Refill:  11        Blood pressure stable although given current headache syndrome along with epistaxis relatively new onset, complaint of hearing things"/tinnitus, will get head CT noncontrast to assess for any concern about bleed  Prior positive cocaine use, states that last use was several months ago, nothing more recently     Continue ENT follow-up and treatment plan for his epistaxis as directed     Strongly encourage repeat visit with neuro surgery given concerns about cervical myelopathy/radiculopathy, and continued issues with worsening " balance and left upper extremity weakness.    Diabetes:  A1c was high on recent testing.  Compliant with his insulin and metformin as above.  Has not been on Trulicity recently.  Had a prior history of pancreatitis which was alcohol induced but does not drink any longer.  No history of recurrent pancreatitis.  Will try low-dose Mounjaro 2.5 mg weekly    Can reassess in 3 months

## 2023-12-11 NOTE — TELEPHONE ENCOUNTER
This patient was seen by his PCP, Dr Lopez, today. He is requesting an appointment with Dr Christian regarding his neck surgery.

## 2023-12-11 NOTE — TELEPHONE ENCOUNTER
Contacted pt in regards of a message that was sent, I stated to pt that  is no longer with [facility] and I can schedule an appointment with Paige. Pt stated that is fine, I scheduled pt to see Paige on Tuesday, 12/12 at 2:00 pm. Pt confirmed and voiced understanding

## 2023-12-12 ENCOUNTER — OFFICE VISIT (OUTPATIENT)
Dept: NEUROSURGERY | Facility: CLINIC | Age: 68
End: 2023-12-12
Payer: MEDICARE

## 2023-12-12 ENCOUNTER — HOSPITAL ENCOUNTER (OUTPATIENT)
Dept: RADIOLOGY | Facility: HOSPITAL | Age: 68
Discharge: HOME | End: 2023-12-12
Attending: PHYSICIAN ASSISTANT
Payer: MEDICARE

## 2023-12-12 VITALS
BODY MASS INDEX: 33.02 KG/M2 | DIASTOLIC BLOOD PRESSURE: 72 MMHG | SYSTOLIC BLOOD PRESSURE: 124 MMHG | WEIGHT: 257.25 LBS | HEIGHT: 74 IN | HEART RATE: 75 BPM

## 2023-12-12 DIAGNOSIS — M48.02 CERVICAL SPINAL STENOSIS: ICD-10-CM

## 2023-12-12 DIAGNOSIS — M47.12 CERVICAL SPONDYLOSIS WITH MYELOPATHY: Primary | ICD-10-CM

## 2023-12-12 DIAGNOSIS — M50.30 DDD (DEGENERATIVE DISC DISEASE), CERVICAL: ICD-10-CM

## 2023-12-12 DIAGNOSIS — Z98.1 S/P CERVICAL SPINAL FUSION: ICD-10-CM

## 2023-12-12 DIAGNOSIS — M54.12 CERVICAL RADICULOPATHY: ICD-10-CM

## 2023-12-12 PROCEDURE — 99214 OFFICE O/P EST MOD 30 MIN: CPT | Mod: S$GLB,,, | Performed by: PHYSICIAN ASSISTANT

## 2023-12-12 PROCEDURE — 1160F PR REVIEW ALL MEDS BY PRESCRIBER/CLIN PHARMACIST DOCUMENTED: ICD-10-PCS | Mod: CPTII,S$GLB,, | Performed by: PHYSICIAN ASSISTANT

## 2023-12-12 PROCEDURE — 99214 PR OFFICE/OUTPT VISIT, EST, LEVL IV, 30-39 MIN: ICD-10-PCS | Mod: S$GLB,,, | Performed by: PHYSICIAN ASSISTANT

## 2023-12-12 PROCEDURE — 1159F PR MEDICATION LIST DOCUMENTED IN MEDICAL RECORD: ICD-10-PCS | Mod: CPTII,S$GLB,, | Performed by: PHYSICIAN ASSISTANT

## 2023-12-12 PROCEDURE — 3074F PR MOST RECENT SYSTOLIC BLOOD PRESSURE < 130 MM HG: ICD-10-PCS | Mod: CPTII,S$GLB,, | Performed by: PHYSICIAN ASSISTANT

## 2023-12-12 PROCEDURE — 3288F PR FALLS RISK ASSESSMENT DOCUMENTED: ICD-10-PCS | Mod: CPTII,S$GLB,, | Performed by: PHYSICIAN ASSISTANT

## 2023-12-12 PROCEDURE — 99999 PR PBB SHADOW E&M-EST. PATIENT-LVL V: CPT | Mod: PBBFAC,,, | Performed by: PHYSICIAN ASSISTANT

## 2023-12-12 PROCEDURE — 3288F FALL RISK ASSESSMENT DOCD: CPT | Mod: CPTII,S$GLB,, | Performed by: PHYSICIAN ASSISTANT

## 2023-12-12 PROCEDURE — 1101F PT FALLS ASSESS-DOCD LE1/YR: CPT | Mod: CPTII,S$GLB,, | Performed by: PHYSICIAN ASSISTANT

## 2023-12-12 PROCEDURE — 4010F PR ACE/ARB THEARPY RXD/TAKEN: ICD-10-PCS | Mod: CPTII,S$GLB,, | Performed by: PHYSICIAN ASSISTANT

## 2023-12-12 PROCEDURE — 3052F HG A1C>EQUAL 8.0%<EQUAL 9.0%: CPT | Mod: CPTII,S$GLB,, | Performed by: PHYSICIAN ASSISTANT

## 2023-12-12 PROCEDURE — 3008F PR BODY MASS INDEX (BMI) DOCUMENTED: ICD-10-PCS | Mod: CPTII,S$GLB,, | Performed by: PHYSICIAN ASSISTANT

## 2023-12-12 PROCEDURE — 1160F RVW MEDS BY RX/DR IN RCRD: CPT | Mod: CPTII,S$GLB,, | Performed by: PHYSICIAN ASSISTANT

## 2023-12-12 PROCEDURE — 1159F MED LIST DOCD IN RCRD: CPT | Mod: CPTII,S$GLB,, | Performed by: PHYSICIAN ASSISTANT

## 2023-12-12 PROCEDURE — 3052F PR MOST RECENT HEMOGLOBIN A1C LEVEL 8.0 - < 9.0%: ICD-10-PCS | Mod: CPTII,S$GLB,, | Performed by: PHYSICIAN ASSISTANT

## 2023-12-12 PROCEDURE — 3074F SYST BP LT 130 MM HG: CPT | Mod: CPTII,S$GLB,, | Performed by: PHYSICIAN ASSISTANT

## 2023-12-12 PROCEDURE — 3008F BODY MASS INDEX DOCD: CPT | Mod: CPTII,S$GLB,, | Performed by: PHYSICIAN ASSISTANT

## 2023-12-12 PROCEDURE — 72052 XR CERVICAL SPINE 5 VIEW WITH FLEX AND EXT: ICD-10-PCS | Mod: 26,,, | Performed by: INTERNAL MEDICINE

## 2023-12-12 PROCEDURE — 4010F ACE/ARB THERAPY RXD/TAKEN: CPT | Mod: CPTII,S$GLB,, | Performed by: PHYSICIAN ASSISTANT

## 2023-12-12 PROCEDURE — 99999 PR PBB SHADOW E&M-EST. PATIENT-LVL V: ICD-10-PCS | Mod: PBBFAC,,, | Performed by: PHYSICIAN ASSISTANT

## 2023-12-12 PROCEDURE — 3078F DIAST BP <80 MM HG: CPT | Mod: CPTII,S$GLB,, | Performed by: PHYSICIAN ASSISTANT

## 2023-12-12 PROCEDURE — 1101F PR PT FALLS ASSESS DOC 0-1 FALLS W/OUT INJ PAST YR: ICD-10-PCS | Mod: CPTII,S$GLB,, | Performed by: PHYSICIAN ASSISTANT

## 2023-12-12 PROCEDURE — 72052 X-RAY EXAM NECK SPINE 6/>VWS: CPT | Mod: TC,FY

## 2023-12-12 PROCEDURE — 1125F AMNT PAIN NOTED PAIN PRSNT: CPT | Mod: CPTII,S$GLB,, | Performed by: PHYSICIAN ASSISTANT

## 2023-12-12 PROCEDURE — 1125F PR PAIN SEVERITY QUANTIFIED, PAIN PRESENT: ICD-10-PCS | Mod: CPTII,S$GLB,, | Performed by: PHYSICIAN ASSISTANT

## 2023-12-12 PROCEDURE — 72052 X-RAY EXAM NECK SPINE 6/>VWS: CPT | Mod: 26,,, | Performed by: INTERNAL MEDICINE

## 2023-12-12 PROCEDURE — 3078F PR MOST RECENT DIASTOLIC BLOOD PRESSURE < 80 MM HG: ICD-10-PCS | Mod: CPTII,S$GLB,, | Performed by: PHYSICIAN ASSISTANT

## 2023-12-12 NOTE — PROGRESS NOTES
"Subjective:     Patient ID:  Vick Gonzalez is a 67 y.o. male.    [place]    Chief Complaint: Neck pain and left arm pain and weakness    Date of Procedure: 8/22/2022         Pre-Operative Diagnosis: Cervical radiculopathy [M54.12]  Cervical spinal stenosis [M48.02]  Cervical myelopathy [G95.9]     Post-Operative Diagnosis: Post-Op Diagnosis Codes:     * Cervical radiculopathy [M54.12]     * Cervical spinal stenosis [M48.02]     * Cervical myelopathy [G95.9]     Anesthesia: General     Procedures performed:  ACDF C4/5, C5/6, C6/7      Surgeon: Titi Christian MD     Assistant:: Yohana Love PA-C, scrubbed and assisting all portions, no resident available.            HPI    Vick Gonzalez is a 67 y.o. male who presents for follow up.    Patient was originally scheduled to have another spine surgery with Dr. Christian but it was canceled.  Dr. Christian was planning to do a C3-T1 posterior cervical fusion.  Patient states that he is still interested in pursuing cervical spine surgery.  He states he has been cleared by primary care Cardiology and transplant.      He has neck pain that comes and goes.  He has left arm pain and numbness to the whole hand.  He feels like his left hand is tight.  His balance has been getting progressively worse.  He denies any right or left arm pain.  He has right arm weakness that has been about the same.    Patient denies any recent accidents or trauma, no saddle anesthesias, and no bowel or bladder incontinence.        Review of Systems:  Constitution: Negative for chills, fever, night sweats and weight loss.   Musculoskeletal: Negative for falls.   Gastrointestinal: Negative for bowel incontinence, nausea and vomiting.   Genitourinary: Negative for bladder incontinence.   Neurological: Negative for disturbances in coordination and loss of balance.      Objective:      Vitals:    12/12/23 1349   BP: 124/72   Pulse: 75   Weight: 116.7 kg (257 lb 4.4 oz)   Height: 6' 2" (1.88 m)   PainSc:   5 "   PainLoc: Neck         Physical Exam:      General:  Vick Gonzalez is well-developed, well-nourished, appears stated age, in no acute distress, alert and oriented to person, place, and time.    Pulmonary/Chest:  Respiratory effort normal  Abdominal: Exhibits no distension  Psychiatric:  Normal mood and affect.  Behavior is normal.  Judgement and thought content normal      Musculoskeletal:    Cervical ROM:   Pain in cervical spine flexion, extension, left rotation, and right rotation, left lateral bending, and right lateral bending.    Cervical Spine Inspection:  Healed anterior surgical scars with no visible rashes.    Cervical Spine Palpation:  No tenderness to cervical spine palpation.      Neurological:    Muscle strength against resistance:     Right Left   Deltoid  5 / 5 5 / 5   Biceps  4 / 5 5 / 5   Triceps 4 / 5 5 / 5   Wrist flexion  5 / 5 5 / 5   Wrist extension 5 / 5 5 / 5   Finger abduction 5 / 5 5 / 5   Thumb opposition 5 / 5 5 / 5   Handgrip 4 / 5 4 / 5   Hip flexion  5 / 5 5 / 5   Hip extension 5 / 5 5 / 5   Hip abduction 5 / 5 5 / 5   Hip adduction 5/ 5 5 / 5   Knee extension  5 / 5 5 / 5   Knee flexion  5 / 5 5 / 5   Dorsiflexion  5 / 5 5 / 5   EHL  5 / 5 5 / 5   Plantar flexion  5 / 5 5 / 5   Inversion of the feet 5 / 5 5 / 5   Eversion of the feet 5 / 5 5 / 5       Reflexes:     Right Left   Triceps 2+ 2+   Biceps 2+ 2+   Brachioradialis 2+ 2+   Patellar 3+ 3+   Achilles 2+ 2+     Clonus:  Negative bilaterally  Castro: Negative on the left and positive on the right    On gross examination of the bilateral lower extremities, patient has full painfree ROM with no signs of clubbing, cyanosis, edema, and weakness.      XRAY Interpretation:     Cervical spine xrays were personally reviewed today.  No fractures.  No movement on flexion and extension.  Hardware intact.      Assessment:          1. Cervical spondylosis with myelopathy    2. DDD (degenerative disc disease), cervical    3. Cervical  radiculopathy    4. S/P cervical spinal fusion    5. Cervical spinal stenosis            Plan:          Orders Placed This Encounter    MRI Cervical Spine Without Contrast       Will get updated MRI cervical spine and refer to Dr. Payne for cspine surgery consult    Follow-Up:  Follow up in about 1 month (around 1/12/2024). If there are any questions prior to this, the patient was instructed to contact the office.       HERMELINDO Iraheta, PA-C  Neurosurgery  [facility]  12/12/2023

## 2023-12-15 DIAGNOSIS — Z94.4 S/P LIVER TRANSPLANT: ICD-10-CM

## 2023-12-15 RX ORDER — TACROLIMUS 1 MG/1
CAPSULE ORAL
Qty: 90 CAPSULE | Refills: 11 | Status: SHIPPED | OUTPATIENT
Start: 2023-12-15

## 2023-12-22 NOTE — PROGRESS NOTES
Chronic patient Established Note (Follow up visit)      SUBJECTIVE:    Vick Gonzalez presents to the clinic for a follow-up appointment for lower back pain and medication refill. He continues to report low back pain that radiates down the back of his legs to the top of his foot. He does report a recent stent placed in his RLE for PAD. He reports that this has greatly relieved his foot pain. He has had multiple procedures in the past with limited relief. He is not interested in further procedures at this time. He continues to take Roxicodone 15 mg QID PRN and Gabapentin with benefit and without adverse effects. He reports that he needs a refill of his Gabapentin. He denies any bowel or bladder incontinence. His pain today is 7/10.      Of note, the patient has a history of previous liver transplant and is monitored by hepatology.  His LFTs are chronically elevated which is believed to be due to HCV.    Pain Disability Index Review:  Last 3 PDI Scores 5/31/2017 3/1/2017 11/30/2016   Pain Disability Index (PDI) 27 40 0       Pain Medications:    - Opioids: Roxicodone (Oxycodone/Acetaminophen) 15 mg QID PRN pain    Others: Gabapentin 2400 mg daily    Opioid Contract: yes     report:  Reviewed and consistent with medication use as prescribed.    Pain Procedures:   1/15/15 Right TFESI @ L5 & S1     Physical Therapy/Home Exercise: no    Imaging:     Lumbar MRI 6/16/15  Narrative   Technique: Routine lumbar spine MRI performed without contrast.    Comparison: MRI 06/16/2015, CT 06/15/2015.    Findings:    There are postsurgical changes consistent with L5-S1 posterior instrumented fusion with bilateral pedicular screws, vertical stabilizing rods and an intervertebral disk spacer.  When compared to the MRI dated 06/16/2015 00:33, there are new postsurgical   changes consistent with left L5 hemilaminectomy.  There is a 2.5 cm ill-defined fluid collection at the site of hemilaminectomy that is not well evaluated due to  Patient was wondering if there was any paperwork that needed to be filled out prior to his appointment on 1/4/2024 requests it to be emailed to him so he can fill it out prior to his appointment. Requests a callback in regards also   the lack of IV contrast but appears to extend anteriorly into the spinal canal and into the   left foraminal zone.  There is as possible small fluid collection within the anterior right epidural space that may also due to compression of the adjacent spinal canal.  There is stable grade I anterolistheses of L5 on S1 with persistent high signal   intensity adjacent to the left lateral aspect of the disk spacer that demonstrates moderate associated bone marrow edema of the adjacent vertebral endplates, findings that are when compared to the previous exam.  Note is made that there is an apparent   high signal intensity within the nerve roots posterior to the L5-S1 level that was not definitely present on the previous exam.    There is mild persistent height loss loss involving the L5 vertebral body, likely degenerative in nature.  Remaining vertebral body heights are well-maintained without findings to suggest acute fracture.    There is mild intervertebral disk spacing and desiccation at L4-L5 with a small circumferential disk bulge. No disk protrusion or extrusion. There is moderate bilateral facet arthropathy and mild bilateral uncomfortable and buckling at this level.  These   findings contribute to mild spinal canal stenosis and mild bilateral foraminal narrowing.    Noting aforementioned postsurgical changes, there is persistent severe bilateral foraminal narrowing that is difficult to accurately characterize due to adjacent artifact.    There is edema anterior to the L4, L5 and S1 vertebral bodies, presumably postsurgical in nature.  No drainable fluid collections identified. Visualized retroperitoneal organs are unremarkable.   Impression       Postsurgical changes of L5-S1 posterior spinal fusion and left L5 hemilaminectomy. There is an ill-defined fluid collection at the site of hemilaminectomy that is not well evaluated due to the lack of IV contrast but appears to extend anteriorly with   suspected resulting  mass effect on the spinal canal and the left foraminal zone.  There is a suspected small fluid collection within the anterior right epidural space that may contribute to additional mass effect on the adjacent spinal canal. While   findings may represent sequela of expected postsurgical changes, continued follow-up is advised to ensure improvement and/or resolution.    Persistent abnormal high signal intensity adjacent to the left lateral aspect of the intervertebral disk spacer with moderate associated bone marrow edema of the adjacent vertebral endplates. Findings are similar when compared to the previous exam could   represent postsurgical changes. Early infection could have a similar appearance and is possible. Continued follow-up is advised.    Apparent high signal intensity within the nerve roots posterior to the L5-S1 level that was not definitely present on the previous exam. Findings may be artifactual in nature however, sequela of nerve root inflammation/edema is possible noting recent   surgery.    This report has been flagged in the Epic medical record     .  CMP  Sodium   Date Value Ref Range Status   11/06/2017 139 136 - 145 mmol/L Final     Potassium   Date Value Ref Range Status   11/06/2017 3.9 3.5 - 5.1 mmol/L Final     Chloride   Date Value Ref Range Status   11/06/2017 107 95 - 110 mmol/L Final     CO2   Date Value Ref Range Status   11/06/2017 23 23 - 29 mmol/L Final     Glucose   Date Value Ref Range Status   11/06/2017 161 (H) 70 - 110 mg/dL Final     BUN, Bld   Date Value Ref Range Status   11/06/2017 16 6 - 20 mg/dL Final     Creatinine   Date Value Ref Range Status   11/06/2017 1.2 0.5 - 1.4 mg/dL Final     Calcium   Date Value Ref Range Status   11/06/2017 9.1 8.7 - 10.5 mg/dL Final     Total Protein   Date Value Ref Range Status   11/06/2017 7.2 6.0 - 8.4 g/dL Final     Albumin   Date Value Ref Range Status   11/06/2017 3.2 (L) 3.5 - 5.2 g/dL Final     Total Bilirubin   Date Value Ref  Range Status   11/06/2017 0.5 0.1 - 1.0 mg/dL Final     Comment:     For infants and newborns, interpretation of results should be based  on gestational age, weight and in agreement with clinical  observations.  Premature Infant recommended reference ranges:  Up to 24 hours.............<8.0 mg/dL  Up to 48 hours............<12.0 mg/dL  3-5 days..................<15.0 mg/dL  6-29 days.................<15.0 mg/dL       Alkaline Phosphatase   Date Value Ref Range Status   11/06/2017 85 55 - 135 U/L Final     AST   Date Value Ref Range Status   11/06/2017 21 10 - 40 U/L Final     ALT   Date Value Ref Range Status   11/06/2017 22 10 - 44 U/L Final     Anion Gap   Date Value Ref Range Status   11/06/2017 9 8 - 16 mmol/L Final     eGFR if    Date Value Ref Range Status   11/06/2017 >60.0 >60 mL/min/1.73 m^2 Final     eGFR if non    Date Value Ref Range Status   11/06/2017 >60.0 >60 mL/min/1.73 m^2 Final     Comment:     Calculation used to obtain the estimated glomerular filtration  rate (eGFR) is the CKD-EPI equation.            Allergies:   Review of patient's allergies indicates:   Allergen Reactions    Naproxen      Other reaction(s): problems w/liver/kid    Oxaprozin      Other reaction(s): cause problems w/kid/liver       Current Medications:   Current Outpatient Prescriptions   Medication Sig Dispense Refill    allopurinol (ZYLOPRIM) 100 MG tablet Take 1 tablet (100 mg total) by mouth 2 (two) times daily. 60 tablet 11    amitriptyline (ELAVIL) 25 MG tablet TAKE 1 TABLET (25 MG TOTAL) BY MOUTH EVERY EVENING. FOR NERVE PAIN AND SLEEP 30 tablet 5    blood sugar diagnostic Strp 1 strip by Misc.(Non-Drug; Combo Route) route 4 (four) times daily before meals and nightly. 100 strip 11    blood-glucose meter Misc 1 each by Misc.(Non-Drug; Combo Route) route 4 (four) times daily before meals and nightly. 1 each 0    calcium carbonate-vitamin D3 (CALTRATE 600 + D) 600 mg(1,500mg) -400  "unit Chew       colchicine 0.6 mg tablet Take 1 tablet (0.6 mg total) by mouth 2 (two) times daily. 30 tablet 2    EPCLUSA 400-100 mg Tab Take 1 tablet by mouth once daily. 28 tablet 5    famotidine (PEPCID) 40 MG tablet Take 1 tablet (40 mg total) by mouth once daily. 30 tablet 2    gabapentin (NEURONTIN) 600 MG tablet Take 600 mg by mouth 4 (four) times daily.  2    insulin aspart (NOVOLOG) 100 unit/mL InPn pen Inject 12 Units into the skin 3 (three) times daily with meals. 10.8 mL 11    insulin detemir (LEVEMIR FLEXTOUCH) 100 unit/mL (3 mL) SubQ InPn pen Inject 55 Units into the skin every evening. 15 mL 11    lancets Misc 1 each by Misc.(Non-Drug; Combo Route) route 4 (four) times daily before meals and nightly. 200 each 11    lancing device Misc 1 each by Misc.(Non-Drug; Combo Route) route 4 (four) times daily before meals and nightly. 1 each 0    levothyroxine (SYNTHROID) 88 MCG tablet TAKE 1 TABLET (88 MCG TOTAL) BY MOUTH ONCE DAILY. 30 tablet 6    lisinopril 10 MG tablet TAKE 1 TABLET (10 MG TOTAL) BY MOUTH ONCE DAILY. 90 tablet 2    metoprolol tartrate (LOPRESSOR) 50 MG tablet TAKE 3 TABLETS BY MOUTH 2 (TWO) TIMES DAILY. 180 tablet 3    multivitamin (THERAGRAN) per tablet Take 1 tablet by mouth.      nifedipine (ADALAT CC) 60 MG TbSR Take 1 tablet (60 mg total) by mouth once daily. 90 tablet 11    pen needle, diabetic (BD ULTRA-FINE MENDY PEN NEEDLES) 32 gauge x 5/32" Ndle Use with aspart TIDWM and with detemir  each 12    ribavirin (COPEGUS) 200 MG tablet Take as directed. Initial dose will be 3 tablets by PO once daily. Will increase as tolerated to 2 tablets PO  tablet 5    sertraline (ZOLOFT) 100 MG tablet Take 1 tablet (100 mg total) by mouth once daily. 30 tablet 11    sildenafil (REVATIO) 20 mg Tab Take 5 po 1 hour before sexual activity 50 tablet 11    tacrolimus (PROGRAF) 1 MG Cap Take 2mg in AM and 1mg in PM, PO. Liver Transplant Z94.4 90 capsule 11     No current " facility-administered medications for this visit.        REVIEW OF SYSTEMS:    GENERAL:  No weight loss, malaise or fevers.  HEENT:  Negative for frequent or significant headaches.  NECK:  Negative for lumps, goiter, pain and significant neck swelling.  RESPIRATORY:  Negative for cough, wheezing or shortness of breath.  CARDIOVASCULAR:  Negative for chest pain, leg swelling or palpitations. H/O hypertension.  GI:  Negative for abdominal discomfort, blood in stools or black stools or change in bowel habits.  MUSCULOSKELETAL:  See HPI.  SKIN:  Negative for lesions, rash, and itching.  PSYCH:  Negative for sleep disturbance, mood disorder and recent psychosocial stressors.  HEMATOLOGY/LYMPHOLOGY:  Negative for prolonged bleeding, bruising easily or swollen nodes. H/O liver transplant.  NEURO:   No history of headaches, syncope, paralysis, seizures or tremors.  ENDO: Diabetes.   All other reviewed and negative other than HPI.    Past Medical History:  Past Medical History:   Diagnosis Date    Anemia     Anxiety     Chronic hepatitis C virus infection - RELAPSE following harvoni + RBV 8/23/2011    Completed 12 weeks Harvoni + RBV w/ RELAPSE 6 weeks out (3/2016)  Wk 1 Hgb 12.4, prograf 6.8 Wk 2 Hgb 12.7 Wk 4 Hgb 12.8, prograf 5.4; HCV RNA 47. INCREASE RBV to 600 Wk 5 Hgb 12.6 Wk 6 Hgb 12.4, HCV RNA <12, detected. INCREASE  Wk 7 Hgb 11.8 Wk 8 Hgb 11.7, prograf 4.0. INCREASE RBV 1000, INCREASE PROGRAF 2/1 Wk 9 Hgb 12.4, prograf 3.6  HCV RNA <12, detected. INCREASE PROGRAF 2/2 Wk 10 hgb     Chronic pain syndrome 7/13/2011    CKD (chronic kidney disease), stage III     Diabetes mellitus type II, uncontrolled     Discitis of lumbosacral region 1/16/2015    ED (erectile dysfunction)     Genital herpes     Gout, arthritis     History of alcohol abuse     History of positive PPD, treatment status unknown     Pulmonary granulomas, negative sputum cultures for AFB and indeterminate quantferon test    History of  "substance abuse     Hypertension     Hypothyroidism     Liver replaced by transplant 8/23/2011    DATE: 12/16/2013  LIVER BIOPSY : REASON:  hep C staging  PATHOLOGY COMMITTEE NOTE/PLAN: :grade  1 / stage 1        Peptic ulcer disease        Past Surgical History:  Past Surgical History:   Procedure Laterality Date    CHOLECYSTECTOMY      LIVER TRANSPLANT  06/2010    SPINE SURGERY         Family History:  Family History   Problem Relation Age of Onset    Cancer Mother     Diabetes Mother     Heart disease Mother     Diabetes Sister     Cancer Maternal Uncle 82     colon CA    Melanoma Neg Hx     Psoriasis Neg Hx     Lupus Neg Hx     Eczema Neg Hx     Acne Neg Hx        Social History:  Social History     Social History    Marital status:      Spouse name: N/A    Number of children: N/A    Years of education: N/A     Social History Main Topics    Smoking status: Former Smoker    Smokeless tobacco: Never Used    Alcohol use No      Comment: over 5 years ago, none currently    Drug use: No    Sexual activity: Not on file     Other Topics Concern    Not on file     Social History Narrative    No narrative on file       OBJECTIVE:    BP (!) 165/90   Pulse 90   Temp 98.9 °F (37.2 °C) (Oral)   Resp 16   Ht 6' 2" (1.88 m)   Wt 123 kg (271 lb 2.7 oz)   BMI 34.82 kg/m²     PHYSICAL EXAMINATION:    General appearance: Well appearing, in no acute distress, alert and oriented x3.  Psych:  Mood and affect appropriate.  Skin: Skin color, texture, turgor normal, no rashes or lesions, in both upper and lower body.  Head/face:  Atraumatic, normocephalic. No palpable lymph nodes.  GI: Abdomen soft and non-tender.  Back: Straight leg raising in the sitting and supine positions is negative to radicular pain. Limited lumbar flexion and extension with pain.  Positive facet loading bilaterally. There is pain with palpation over lumbar spine and bilateral SI joints.  Positive HUBER " bilaterally.  Extremities: Peripheral joint ROM is full and pain free without obvious instability or laxity in all four extremities. No deformities, edema, or skin discoloration. Good capillary refill.  Musculoskeletal:  Right plantar flexion 4/5.  All other LE strength 5/5 and symmetric.  No atrophy or tone abnormalities are noted.  Neuro: Bilateral upper and lower extremity coordination and muscle stretch reflexes are physiologic and symmetric.  Plantar response are downgoing.  Decreased sensation to anterior aspect of right foot.   Gait: Antalgic- ambulates without assistance.    ASSESSMENT: 61 y.o. year old male with lower back and right leg pain, consistent with the following diagnoses:     1. Postlaminectomy syndrome of lumbar region  oxyCODONE (ROXICODONE) 15 MG Tab    oxyCODONE (ROXICODONE) 15 MG Tab    oxyCODONE (ROXICODONE) 15 MG Tab    gabapentin (NEURONTIN) 800 MG tablet   2. Lumbar radiculopathy  oxyCODONE (ROXICODONE) 15 MG Tab    oxyCODONE (ROXICODONE) 15 MG Tab    oxyCODONE (ROXICODONE) 15 MG Tab    gabapentin (NEURONTIN) 800 MG tablet   3. Acquired spondylolisthesis  oxyCODONE (ROXICODONE) 15 MG Tab    oxyCODONE (ROXICODONE) 15 MG Tab    oxyCODONE (ROXICODONE) 15 MG Tab    gabapentin (NEURONTIN) 800 MG tablet   4. Chronic pain syndrome  oxyCODONE (ROXICODONE) 15 MG Tab    oxyCODONE (ROXICODONE) 15 MG Tab    oxyCODONE (ROXICODONE) 15 MG Tab    gabapentin (NEURONTIN) 800 MG tablet   5. Chronic, continuous use of opioids  oxyCODONE (ROXICODONE) 15 MG Tab    oxyCODONE (ROXICODONE) 15 MG Tab    oxyCODONE (ROXICODONE) 15 MG Tab    gabapentin (NEURONTIN) 800 MG tablet   6. Encounter for monitoring opioid maintenance therapy  [facility] Clinic Drug Screen         PLAN:     - Previous imaging was reviewed and discussed with the patient today. Recent labs reviewed with patient today.     - Patient can continue Roxicodone 15 mg QID PRN pain, #120. 3 months of prescriptions with appropriate dates was supplied today.      - The [place] Board of Pharmacy website for prescription monitoring was consulted today, and it does not suggest any deviations in conflict with the patient's controlled substance contract with our clinic. Will continue current therapy with frequent monitoring of the controlled substance database, and urine drug screens on followup. Refill approved.  Medication management by Dr. Penn.    - Continue Gabapentin 800 mg TID. Refill sent today.     - UDS from 5/31/2017 reviewed and consistent.    - UDS done today.     - RTC in 3 months or sooner if needed.    - Counseled patient regarding the importance of constant sleeping habits and physical therapy.  The patient will continue a home exercise routine to help with pain and strengthening.      - Dr. Penn was consulted on the patient and agrees with this plan.    The above plan and management options were discussed at length with patient. Patient is in agreement with the above and verbalized understanding.    Emma Batista NP  11/30/2017

## 2024-01-09 NOTE — TELEPHONE ENCOUNTER
Care Due:                  Date            Visit Type   Department     Provider  --------------------------------------------------------------------------------                                [facility]  Last Visit: 12-      FOLLOW UP    MEDICINE       Emanuel Lopez  Next Visit: None Scheduled  None         None Found                                                            Last  Test          Frequency    Reason                     Performed    Due Date  --------------------------------------------------------------------------------    Uric Acid...  12 months..  allopurinoL..............  Not Found    Overdue    [employer] Embedded Care Due Messages. Reference number: [id number].   1/09/2024 11:49:17 AM CST

## 2024-01-10 RX ORDER — INSULIN LISPRO 100 [IU]/ML
20 INJECTION, SOLUTION INTRAVENOUS; SUBCUTANEOUS
Qty: 60 ML | Refills: 1 | Status: SHIPPED | OUTPATIENT
Start: 2024-01-10

## 2024-01-10 NOTE — TELEPHONE ENCOUNTER
Provider Staff:  Action required for this patient     Please see care gap opportunities below in Care Due Message.    Thanks!  [facility]     Appointments      Date Provider   Last Visit   12/11/2023 Emanuel Lopez MD   Next Visit   Visit date not found Emanuel Lopez MD      Refill Decision Note   Vick Gonzalez  is requesting a refill authorization.  Brief Assessment and Rationale for Refill:  Approve     Medication Therapy Plan:         Comments:     Note composed:4:12 AM 01/10/2024

## 2024-01-12 DIAGNOSIS — E11.65 UNCONTROLLED TYPE 2 DIABETES MELLITUS WITH HYPERGLYCEMIA: ICD-10-CM

## 2024-01-12 RX ORDER — INSULIN GLARGINE 300 U/ML
40 INJECTION, SOLUTION SUBCUTANEOUS DAILY
Qty: 3 ML | Refills: 11 | Status: SHIPPED | OUTPATIENT
Start: 2024-01-12

## 2024-01-29 ENCOUNTER — OFFICE VISIT (OUTPATIENT)
Dept: NEUROSURGERY | Facility: CLINIC | Age: 69
End: 2024-01-29
Payer: MEDICARE

## 2024-01-29 VITALS
BODY MASS INDEX: 33.02 KG/M2 | DIASTOLIC BLOOD PRESSURE: 86 MMHG | HEIGHT: 74 IN | SYSTOLIC BLOOD PRESSURE: 163 MMHG | WEIGHT: 257.25 LBS | HEART RATE: 80 BPM

## 2024-01-29 DIAGNOSIS — Z98.1 STATUS POST CERVICAL SPINAL FUSION: ICD-10-CM

## 2024-01-29 DIAGNOSIS — M47.22 CERVICAL SPONDYLOSIS WITH MYELOPATHY AND RADICULOPATHY: ICD-10-CM

## 2024-01-29 DIAGNOSIS — M48.02 SPINAL STENOSIS, CERVICAL REGION: ICD-10-CM

## 2024-01-29 DIAGNOSIS — Z01.818 PRE-OP TESTING: Primary | ICD-10-CM

## 2024-01-29 DIAGNOSIS — M47.12 CERVICAL SPONDYLOSIS WITH MYELOPATHY AND RADICULOPATHY: ICD-10-CM

## 2024-01-29 PROCEDURE — 99999 PR PBB SHADOW E&M-EST. PATIENT-LVL V: CPT | Mod: PBBFAC,,, | Performed by: NEUROLOGICAL SURGERY

## 2024-01-29 PROCEDURE — 99214 OFFICE O/P EST MOD 30 MIN: CPT | Mod: S$GLB,,, | Performed by: NEUROLOGICAL SURGERY

## 2024-01-29 RX ORDER — NIFEDIPINE 30 MG/1
30 TABLET, EXTENDED RELEASE ORAL
COMMUNITY
Start: 2023-12-25 | End: 2024-04-01 | Stop reason: ALTCHOICE

## 2024-01-29 RX ORDER — LISINOPRIL 20 MG/1
20 TABLET ORAL
COMMUNITY
Start: 2023-12-25

## 2024-01-29 NOTE — PROGRESS NOTES
NEUROSURGICAL PROGRESS NOTE    DATE OF SERVICE:  01/29/2024    ATTENDING PHYSICIAN:  Khadar Payne MD    SUBJECTIVE:    INTERIM HISTORY:    This is a very pleasant 68 y.o. male, liver transplant, chronic kidney disease stage 3, type 2 diabetes not well-controlled, status post C4-7 anterior cervical fusion for cervical spondylosis with myelopathy and radiculopathy performed by Dr. Christian on 08/03/2022.  Patient reports left arm pain and hand pain in the C8 distribution becoming progressively worse.  The pain was present before his spine surgery, did not improve after surgery and now has become worse.  His chronic gait imbalance has remained stable.  His bilateral hand numbness and hand weakness have remained stable.  He underwent a left C7-T1 transforaminal epidural steroid injection the pain management in May 2023 with no significant pain relief               PAST MEDICAL HISTORY:  Active Ambulatory Problems     Diagnosis Date Noted    Chronic pain syndrome 07/13/2011    Acquired hypothyroidism     History of hepatitis C, s/p successful Rx w/ SVR24 (cure) - 5/2018 08/23/2011    Gout, unspecified 03/14/2011    Substance abuse 10/24/2010    Thrombocytopenia 06/16/2010    Anxiety     Lumbar radiculopathy 04/08/2015    Acquired spondylolisthesis 05/12/2015    Essential hypertension 06/19/2015    Type 2 diabetes mellitus with diabetic polyneuropathy, with long-term current use of insulin 10/04/2016    S/P liver transplant 10/04/2016    Hypertriglyceridemia 10/05/2016    CKD (chronic kidney disease), stage III     PAD (peripheral artery disease) 08/30/2017    Erectile dysfunction due to arterial insufficiency 10/09/2017    BPH with urinary obstruction 10/09/2017    Immunosuppressed status 10/16/2017    Low testosterone in male 07/25/2018    Hypertension associated with diabetes 12/01/2020    Hyperlipidemia associated with type 2 diabetes mellitus 12/01/2020    Erectile dysfunction associated with type 2 diabetes mellitus  12/01/2020    Claudication in peripheral vascular disease 12/01/2020    Aortic atherosclerosis 11/16/2010    Calcified granuloma of lung 10/22/2010    Carpal tunnel syndrome of left wrist 07/13/2021    Acute congestive heart failure 05/12/2023    Alcohol dependence, in remission 05/26/2023    Pulmonary emphysema, unspecified emphysema type 05/26/2023    Coronary artery disease involving native coronary artery of native heart with angina pectoris 08/24/2023    Epistaxis 12/04/2023     Resolved Ambulatory Problems     Diagnosis Date Noted    Abdominal pain, generalized 08/25/2011    Abdominal pain, right upper quadrant 09/16/2010    Abdominal tenderness, right upper quadrant 05/15/2011    Unspecified chronic liver disease without mention of alcohol     Hypertension     Acute alcoholic hepatitis 08/02/2010    Acute bronchitis 03/14/2011    Acute gouty arthropathy 08/28/2011    Aftercare following organ transplant 06/17/2010    Alcoholic cirrhosis of liver 10/11/2010    Anemia of other chronic disease 07/15/2010    Cholangitis 07/15/2010    Chronic hepatitis C with hepatic coma 06/08/2011    Cirrhosis of liver without mention of alcohol 07/28/2011    Complications of transplanted liver 05/11/2011    Dietary surveillance and counseling 07/28/2011    Encephalopathy, unspecified 06/16/2010    Family history of diabetes mellitus 07/15/2010    Hematuria, unspecified 02/11/2012    Hepatomegaly 08/23/2011    Liver replaced by transplant 08/23/2011    Encounter for long-term (current) use of steroids 06/02/2011    Encounter for long-term (current) use of aspirin 01/16/2011    Microscopic hematuria 05/11/2011    Mononeuritis of unspecified site 07/15/2010    Need for prophylactic immunotherapy 06/02/2011    Nocturia 11/02/2010    Alcohol abuse, in remission 06/02/2010    Obstruction of bile duct 10/24/2010    Open wound of finger(s) , without mention of complication 07/12/2011    Other and unspecified alcohol dependence,  unspecified drinking behavior 01/16/2011    Other ascites 06/16/2010    Other cells and casts in urine 11/02/2010    Other sequelae of chronic liver disease 07/28/2011    Other specified disorders of biliary tract 10/23/2010    Other specified disorders of liver 09/26/2010    Peptic ulcer, unspecified site, unspecified as acute or chronic, without mention of hemorrhage, perforation, or obstruction 10/20/2010    Personal history of other infectious and parasitic disease 11/15/2010    Portal hypertension 07/26/2010    Secondary diabetes mellitus without mention of complication, uncontrolled 10/24/2010    Unspecified disorder of male genital organs 11/02/2010    Genital herpes, unspecified 06/02/2011    Orchitis and epididymitis, unspecified 10/12/2010    Unspecified viral hepatitis C without hepatic coma 10/20/2010    Hepatitis C 12/10/2012    Genital herpes     Hyperpigmentation 10/21/2014    Diskitis 01/15/2015    Lower back pain 01/15/2015    Discitis of lumbosacral region 01/16/2015    Lumbar discitis 06/15/2015    Lumbar myelopathy 06/18/2015    Abnormal gait 07/09/2015    Muscle weakness 07/09/2015    LBP (low back pain) 07/09/2015    S/P lumbar spinal fusion 08/11/2015    Hyponatremia 10/04/2016    OSMANY (acute kidney injury) 10/04/2016    Volume depletion 02/28/2017    Ischemic leg 08/30/2017    Sacroiliitis 12/02/2019    Abdominal pain 01/23/2020    SBO (small bowel obstruction)     Sepsis 11/11/2020    Transaminitis 11/11/2020    Right lower quadrant abdominal pain 11/12/2020    Diarrhea 11/13/2020    Severe obesity (BMI 35.0-39.9) with comorbidity 12/01/2020    Diabetic polyneuropathy associated with type 2 diabetes mellitus 12/01/2020    Penetrating foot wound 10/11/2021    Diabetic foot infection 10/11/2021    Left hand weakness 02/01/2022    Fine motor impairment 02/14/2022    Loss of feeling or sensation 02/14/2022    Myeloradiculopathy 08/22/2022    Chronic neck pain with history of cervical spinal  surgery 01/10/2023    Hypomagnesemia 05/13/2023    NSTEMI (non-ST elevated myocardial infarction) 08/02/2023     Past Medical History:   Diagnosis Date    Anemia     Cataract     Diabetes mellitus type II, uncontrolled     ED (erectile dysfunction)     Encounter for blood transfusion     Gout, arthritis     History of alcohol abuse     History of positive PPD, treatment status unknown     History of substance abuse     Hypothyroidism     Pancreatitis 2016    Peptic ulcer disease        PAST SURGICAL HISTORY:  Past Surgical History:   Procedure Laterality Date    ANTERIOR CERVICAL DISCECTOMY W/ FUSION N/A 8/22/2022    Procedure: Procedure: ACDF 4-7 LOS: 4.0 Anesthesia: General Blood: Type & Screen Radiology: C-Arm Microscope: ------- SNS: EMG, SEP, MEP Brace: [place] Bed: Regular Bed, Shoulder Strap Headrest:------ Position: Supine Equipment: Depuy;  Surgeon: Titi Christian MD;  Location: [facility] OR;  Service: Neurosurgery;  Laterality: N/A;  Depuy  confirmed CW 8/19  Neuro monitoring confirmed CW 8/19    CARPAL TUNNEL RELEASE Left 10/29/2021    Procedure: RELEASE, CARPAL TUNNEL;  Surgeon: Jameson Alejo Jr., MD;  Location: [facility] OR;  Service: Orthopedics;  Laterality: Left;    CHOLECYSTECTOMY      CORONARY ANGIOGRAPHY N/A 8/2/2023    Procedure: ANGIOGRAM, CORONARY ARTERY;  Surgeon: Juan Vera MD;  Location: [facility] CATH LAB/EP;  Service: Cardiology;  Laterality: N/A;    DECOMPRESSION OF CERVICAL SPINE BY ANTERIOR APPROACH WITH FUSION  8/22/2022    Procedure: DECOMPRESSION AND FUSION, SPINE, CERVICAL, ANTERIOR APPROACH;  Surgeon: Titi Christian MD;  Location: [facility] OR;  Service: Neurosurgery;;    INJECTION OF JOINT Right 12/2/2019    Procedure: INJECTION, JOINT SI;  Surgeon: Thu Penn MD;  Location: [facility] PAIN MGT;  Service: Pain Management;  Laterality: Right;  RT SI JNT INJ    LEFT HEART CATHETERIZATION Left 8/2/2023    Procedure: Left heart cath;  Surgeon: Juan Vera MD;  Location: [facility] CATH LAB/EP;   Service: Cardiology;  Laterality: Left;    LIVER TRANSPLANT  06/2010    SPINE SURGERY      TRANSFORAMINAL EPIDURAL INJECTION OF STEROID Left 5/29/2023    Procedure: INJECTION, STEROID, EPIDURAL, TRANSFORAMINAL APPROACH,  LEFT C7-T1 DIRECT REF;  Surgeon: Thu Penn MD;  Location: [facility];  Service: Pain Management;  Laterality: Left;  4/3 MD ILL/PT WILL C/B       SOCIAL HISTORY:   Social History     Socioeconomic History    Marital status:    Tobacco Use    Smoking status: Former     Passive exposure: Never    Smokeless tobacco: Never   Substance and Sexual Activity    Alcohol use: No     Comment: over 5 years ago, none currently    Drug use: Not Currently     Comment: Former cocaine use    Sexual activity: Yes     Social Determinants of Health     Financial Resource Strain: Low Risk  (12/4/2023)    Overall Financial Resource Strain (CARDIA)     Difficulty of Paying Living Expenses: Not very hard   Food Insecurity: No Food Insecurity (12/4/2023)    Hunger Vital Sign     Worried About Running Out of Food in the Last Year: Never true     Ran Out of Food in the Last Year: Never true   Transportation Needs: No Transportation Needs (12/4/2023)    PRAPARE - Transportation     Lack of Transportation (Medical): No     Lack of Transportation (Non-Medical): No   Physical Activity: Sufficiently Active (12/4/2023)    Exercise Vital Sign     Days of Exercise per Week: 3 days     Minutes of Exercise per Session: 90 min   Stress: No Stress Concern Present (12/4/2023)    [employer] [place] [employer] - Occupational Stress Questionnaire     Feeling of Stress : Only a little   Social Connections: Moderately Integrated (12/4/2023)    Social Connection and Isolation Panel [NHANES]     Frequency of Communication with Friends and Family: More than three times a week     Frequency of Social Gatherings with Friends and Family: More than three times a week     Attends Jew Services: 1 to 4 times per year      Active Member of Clubs or Organizations: No     Attends Club or Organization Meetings: Never     Marital Status:    Housing Stability: Low Risk  (12/4/2023)    Housing Stability Vital Sign     Unable to Pay for Housing in the Last Year: No     Number of Places Lived in the Last Year: 1     Unstable Housing in the Last Year: No       FAMILY HISTORY:  Family History   Problem Relation Age of Onset    Cancer Mother     Diabetes Mother     Heart disease Mother     Diabetes Sister     Cancer Maternal Uncle 82        colon CA    Drug abuse Daughter     Melanoma Neg Hx     Psoriasis Neg Hx     Lupus Neg Hx     Eczema Neg Hx     Acne Neg Hx        CURRENTS MEDICATIONS:  Current Outpatient Medications on File Prior to Visit   Medication Sig Dispense Refill    albuterol (PROAIR HFA) 90 mcg/actuation inhaler Inhale 2 puffs into the lungs every 6 (six) hours as needed for Wheezing. Rescue 18 g 5    allopurinoL (ZYLOPRIM) 100 MG tablet TAKE 1 TABLET BY MOUTH TWICE A  tablet 3    aluminum-magnesium hydroxide-simethicone (MAALOX) 200-200-20 mg/5 mL Susp Take 30 mLs by mouth 4 (four) times daily before meals and nightly. (Patient taking differently: Take 30 mLs by mouth every 6 (six) hours as needed.) 769 mL 0    aspirin 81 MG Chew Take 1 tablet (81 mg total) by mouth once daily. 90 tablet 3    atorvastatin (LIPITOR) 40 MG tablet Take 1 tablet (40 mg total) by mouth once daily. 90 tablet 3    blood-glucose meter,continuous (DEXCOM G7 ) Misc Use as directed. 1 each 11    blood-glucose sensor (DEXCOM G7 SENSOR) Viviane Use as directed. 3 each 11    blood-glucose transmitter (DEXCOM G6 TRANSMITTER) Viviane Use as directed 1 each 11    calcium carbonate-vitamin D3 (CALCIUM 600 WITH VITAMIN D3) 600 mg(1,500mg) -400 unit Chew Take 1 tablet by mouth once daily.       clopidogreL (PLAVIX) 75 mg tablet Take 1 tablet (75 mg total) by mouth once daily. 30 tablet 11    cyanocobalamin (VITAMIN B-12) 100 MCG tablet Take 100 mcg  "by mouth once daily.      diphenhydrAMINE (BENADRYL) 25 mg capsule Take 25 mg by mouth daily as needed for Allergies (Cold).      gabapentin (NEURONTIN) 800 MG tablet Take 1 tablet (800 mg total) by mouth 3 (three) times daily. 90 tablet 11    insulin glargine U-300 conc (TOUJEO MAX U-300 SOLOSTAR) 300 unit/mL (3 mL) insulin pen Inject 40 Units into the skin once daily. 3 mL 11    insulin lispro (HUMALOG KWIKPEN INSULIN) 100 unit/mL pen Inject 20 Units into the skin 3 (three) times daily with meals. 60 mL 1    isosorbide-hydrALAZINE 20-37.5 mg (BIDIL) 20-37.5 mg Tab Take 1 tablet by mouth 3 (three) times daily. 90 tablet 11    lancets 30 gauge Misc 1 lancet by Misc.(Non-Drug; Combo Route) route 4 (four) times daily before meals and nightly. 200 each 11    levothyroxine (SYNTHROID) 88 MCG tablet TAKE 1 TABLET BY MOUTH EVERY DAY 90 tablet 1    lisinopriL (PRINIVIL,ZESTRIL) 20 MG tablet Take 20 mg by mouth.      metoprolol succinate (TOPROL-XL) 200 MG 24 hr tablet TAKE 1 TABLET BY MOUTH EVERY DAY 90 tablet 3    multivit with min-folic acid (MEN'S MULTIVITAMIN GUMMIES) 200 mcg Chew Take 1 tablet by mouth once daily.      NIFEdipine (PROCARDIA-XL) 30 MG (OSM) 24 hr tablet Take 30 mg by mouth.      NOVOFINE PLUS 32 gauge x 1/6" Ndle       papaverine 30 mg/mL injection Tri-Mix - PGE (alprostadil) 10mcg, papavarine 30mg, phentolamine 1mg; dispense 5mL vial, typical starting is 0.2 mL which is equal to 20 units (Patient taking differently: as needed. Tri-Mix - PGE (alprostadil) 10mcg, papavarine 30mg, phentolamine 1mg; dispense 5mL vial, typical starting is 0.2 mL which is equal to 20 units) 10 mL 5    sacubitriL-valsartan (ENTRESTO) 49-51 mg per tablet Take 1 tablet by mouth 2 (two) times daily. 60 tablet 3    tacrolimus (PROGRAF) 1 MG Cap Take 2 capsules (2 mg total) by mouth every morning AND 1 capsule (1 mg total) every evening. 90 capsule 11    tirzepatide (MOUNJARO) 2.5 mg/0.5 mL PnChad Inject 2.5 mg into the skin every " 7 days. 4 Pen 11    traMADoL (ULTRAM) 50 mg tablet Take 1 tablet (50 mg total) by mouth every 6 (six) hours as needed for Pain (severe 7-10 pain only). 6 tablet 0    traZODone (DESYREL) 50 MG tablet TAKE 1 TABLET BY MOUTH EVERY DAY IN THE EVENING 90 tablet 3    TRUE METRIX GLUCOSE TEST STRIP Strp USE 3 TIMES DAILY TO TEST BLOOD GLUCOSE LEVEL 100 strip 11    blood-glucose meter Misc 1 Device by Misc.(Non-Drug; Combo Route) route 3 (three) times daily. 1 each 0    furosemide (LASIX) 20 MG tablet Take 2 tablets (40 mg total) by mouth daily as needed (For Weight Gain > 2-3 lbs in 1 day or 4-6 lbs over 1 week notify PCP and take 40 mg daily for three days). 60 tablet 0    [DISCONTINUED] insulin aspart U-100 (NOVOLOG FLEXPEN U-100 INSULIN) 100 unit/mL (3 mL) InPn pen Inject 20 Units into the skin 3 (three) times daily with meals. 15 mL 11     No current facility-administered medications on file prior to visit.       ALLERGIES:  Review of patient's allergies indicates:  No Known Allergies    REVIEW OF SYSTEMS:  Review of Systems   Constitutional:  Negative for diaphoresis, fever and weight loss.   Respiratory:  Negative for shortness of breath.    Cardiovascular:  Negative for chest pain.   Gastrointestinal:  Negative for blood in stool.   Genitourinary:  Negative for hematuria.   Endo/Heme/Allergies:  Does not bruise/bleed easily.   All other systems reviewed and are negative.        OBJECTIVE:    PHYSICAL EXAMINATION:   Vitals:    01/29/24 0906   BP: (!) 163/86   Pulse: 80       Physical Exam:  Vitals reviewed.    Constitutional: He appears well-developed and well-nourished.     Eyes: Pupils are equal, round, and reactive to light. Conjunctivae and EOM are normal.     Cardiovascular: Normal distal pulses and no edema.     Abdominal: Soft.     Skin: Skin displays no rash on trunk and no rash on extremities. Skin displays no lesions on trunk and no lesions on extremities.     Psych/Behavior: He is alert. He is oriented to  person, place, and time. He has a normal mood and affect.     Musculoskeletal:        Neck: Range of motion is limited.       Back Exam     Muscle Strength   Right Quadriceps:  5/5   Left Quadriceps:  5/5   Right Hamstrings:  5/5   Left Hamstrings:  5/5               Neurologic Exam     Mental Status   Oriented to person, place, and time.   Speech: speech is normal   Level of consciousness: alert    Cranial Nerves   Cranial nerves II through XII intact.     CN III, IV, VI   Pupils are equal, round, and reactive to light.  Extraocular motions are normal.     Motor Exam   Muscle bulk: normal  Overall muscle tone: normal    Strength   Right deltoid: 5/5  Left deltoid: 5/5  Right biceps: 4/5  Left biceps: 5/5  Right triceps: 4/5  Left triceps: 5/5  Right wrist flexion: 5/5  Left wrist flexion: 5/5  Right wrist extension: 5/5  Left wrist extension: 5/5  Right interossei: 4/5  Left interossei: 4/5  Right iliopsoas: 5/5  Left iliopsoas: 5/5  Right quadriceps: 5/5  Left quadriceps: 5/5  Right hamstrin/5  Left hamstrin/5  Right anterior tibial: 5/5  Left anterior tibial: 5/5  Right posterior tibial: 5/5  Left posterior tibial: 5/5  Right peroneal: 5/5  Left peroneal: 5/5  Right gastroc: 5/5  Left gastroc: 5/5    Sensory Exam   Light touch normal.   Pinprick normal.     Gait, Coordination, and Reflexes     Gait  Gait: normal    Reflexes   Right plantar: normal  Left plantar: normal  Right Castro: present  Left Castro: absent  Right ankle clonus: absent  Left ankle clonus: absent        DIAGNOSTIC DATA:  I personally interpreted the following imaging:   Repeat cervical spine MRI on 2023 shows severe left C6-7 and moderate left C7-T1 foraminal stenosis, moderate stenosis at C3-4, no intramedullary signal change    ASSESMENT:  This is a 68 y.o. male with     Problem List Items Addressed This Visit    None  Visit Diagnoses       Pre-op testing    -  Primary    Relevant Orders    CT Cervical Spine Without Contrast     Spinal stenosis, cervical region        Relevant Orders    MRI Cervical Spine Without Contrast    Status post cervical spinal fusion        Cervical spondylosis with myelopathy and radiculopathy                  PLAN:  The patient appears to have persistence left C7 and C8 radiculopathy causing intractable severe pain.  Repeat cervical spine MRI and CT of the cervical spine to plan surgery.    I explained the natural history of the disease and all treatment options. I recommended a minimally invasive left C6-7 and C7-T1 laminotomy, medial facetectomy, foraminotomy to improve his radiculopathy symptoms.  I can not guarantee that his left arm pain will improve following the surgery.    We have discussed the risks of surgery including death, coma, bleeding, infection, failure of surgery, CSF leak, nerve root injury, spinal cord injury, ureter injury, weakness, paralysis, peripheral neuropathy, malplaced hardware, migration of hardware, non-union, need for reoperation. Patient understands the risks and would like to proceed with surgery.    More than 50% of the time was spent on discussing conservative management treatments (medication, physical therapy exercises) and possible interventions (spinal injections and surgical procedures). Care coordination was discussed.    30 minutes          Khadar Payne MD  Cell:[phone]

## 2024-01-30 ENCOUNTER — TELEPHONE (OUTPATIENT)
Dept: NEUROSURGERY | Facility: CLINIC | Age: 69
End: 2024-01-30
Payer: MEDICARE

## 2024-01-30 DIAGNOSIS — M47.22 CERVICAL SPONDYLOSIS WITH MYELOPATHY AND RADICULOPATHY: Primary | ICD-10-CM

## 2024-01-30 DIAGNOSIS — M47.12 CERVICAL SPONDYLOSIS WITH MYELOPATHY AND RADICULOPATHY: Primary | ICD-10-CM

## 2024-02-08 DIAGNOSIS — F11.90 CHRONIC, CONTINUOUS USE OF OPIOIDS: ICD-10-CM

## 2024-02-08 DIAGNOSIS — M54.16 LUMBAR RADICULOPATHY: ICD-10-CM

## 2024-02-08 DIAGNOSIS — M43.10 ACQUIRED SPONDYLOLISTHESIS: ICD-10-CM

## 2024-02-08 DIAGNOSIS — G89.4 CHRONIC PAIN SYNDROME: ICD-10-CM

## 2024-02-08 DIAGNOSIS — M96.1 POSTLAMINECTOMY SYNDROME OF LUMBAR REGION: ICD-10-CM

## 2024-02-08 RX ORDER — GABAPENTIN 800 MG/1
800 TABLET ORAL 3 TIMES DAILY
Qty: 90 TABLET | Refills: 11 | Status: SHIPPED | OUTPATIENT
Start: 2024-02-08

## 2024-02-08 NOTE — TELEPHONE ENCOUNTER
No care due was identified.  [facility] Embedded Care Due Messages. Reference number: [id number].   2/08/2024 2:15:16 PM CST

## 2024-02-08 NOTE — TELEPHONE ENCOUNTER
----- Message from Aydee Cintron sent at 2/8/2024  3:05 PM CST -----  Type:  RX Refill Request    Who Called: pt  Refill or New Rx:refill  RX Name and Strength:gabapentin (NEURONTIN) 800 MG tablet  How is the patient currently taking it? (ex. 1XDay):Take 1 tablet (800 mg total) by mouth 3 (three) times daily. - Oral  Is this a 30 day or 90 day RX:90  Preferred Pharmacy with phone number:[facility]/pharmacy #[id number] - [place] - [address]  [address] [address]  Phone: [phone] Fax: [phone]  Hours: Not open 24 hours      Local or Mail Order:local  Ordering Provider:dr garcia  Would the patient rather a call back or a response via [facility]? Call   Best Call Back Number:[phone]  Additional Information: pt would like to have entire medication list submitted for refill

## 2024-02-09 ENCOUNTER — HOSPITAL ENCOUNTER (OUTPATIENT)
Dept: RADIOLOGY | Facility: HOSPITAL | Age: 69
Discharge: HOME | End: 2024-02-09
Attending: NEUROLOGICAL SURGERY
Payer: MEDICARE

## 2024-02-09 DIAGNOSIS — Z01.818 PRE-OP TESTING: ICD-10-CM

## 2024-02-09 DIAGNOSIS — M48.02 SPINAL STENOSIS, CERVICAL REGION: ICD-10-CM

## 2024-02-09 PROCEDURE — 72125 CT NECK SPINE W/O DYE: CPT | Mod: TC

## 2024-02-09 PROCEDURE — 72141 MRI NECK SPINE W/O DYE: CPT | Mod: TC

## 2024-02-12 ENCOUNTER — TELEPHONE (OUTPATIENT)
Dept: FAMILY MEDICINE | Facility: CLINIC | Age: 69
End: 2024-02-12
Payer: MEDICARE

## 2024-02-12 NOTE — TELEPHONE ENCOUNTER
Called patient to schedule pre-op clearance appointment with Dr Lopez. Left voicemail for return call.

## 2024-02-13 ENCOUNTER — TELEPHONE (OUTPATIENT)
Dept: NEUROSURGERY | Facility: CLINIC | Age: 69
End: 2024-02-13
Payer: MEDICARE

## 2024-02-13 NOTE — TELEPHONE ENCOUNTER
----- Message from Khadar Payne MD sent at 2/12/2024 12:21 PM CST -----  Cancel his surgery. I do not think further decompression will help him. Maybe spinal cord stimulation can help him. Give him an appointment to discuss this further.       ----- Message -----  From: Talia, Rad Results In  Sent: 2/12/2024  11:02 AM CST  To: Khadar Payne MD

## 2024-02-19 ENCOUNTER — TELEPHONE (OUTPATIENT)
Dept: FAMILY MEDICINE | Facility: CLINIC | Age: 69
End: 2024-02-19
Payer: MEDICARE

## 2024-02-19 NOTE — TELEPHONE ENCOUNTER
Attempted to call patient to schedule pre op clearance for Dr Christian, neurosurgery. Left voicemail requesting return call.

## 2024-02-21 ENCOUNTER — PATIENT MESSAGE (OUTPATIENT)
Dept: FAMILY MEDICINE | Facility: CLINIC | Age: 69
End: 2024-02-21
Payer: MEDICARE

## 2024-02-21 ENCOUNTER — TELEPHONE (OUTPATIENT)
Dept: FAMILY MEDICINE | Facility: CLINIC | Age: 69
End: 2024-02-21
Payer: MEDICARE

## 2024-02-21 NOTE — TELEPHONE ENCOUNTER
Attempted to call patient to schedule pre-op clearance appointment with Dr Lopez. Left voicemail requesting a return call and sent a portal message.

## 2024-02-27 ENCOUNTER — TELEPHONE (OUTPATIENT)
Dept: FAMILY MEDICINE | Facility: CLINIC | Age: 69
End: 2024-02-27
Payer: MEDICARE

## 2024-02-27 NOTE — TELEPHONE ENCOUNTER
Attempted to call patient to schedule a pre-op clearance appointment. Left voicemail requesting a return call.

## 2024-03-06 ENCOUNTER — OFFICE VISIT (OUTPATIENT)
Dept: NEUROSURGERY | Facility: CLINIC | Age: 69
End: 2024-03-06
Payer: MEDICARE

## 2024-03-06 VITALS
BODY MASS INDEX: 33.1 KG/M2 | SYSTOLIC BLOOD PRESSURE: 188 MMHG | HEIGHT: 74 IN | DIASTOLIC BLOOD PRESSURE: 99 MMHG | WEIGHT: 257.94 LBS | HEART RATE: 84 BPM

## 2024-03-06 DIAGNOSIS — G56.42 COMPLEX REGIONAL PAIN SYNDROME TYPE 2 OF LEFT UPPER EXTREMITY: ICD-10-CM

## 2024-03-06 DIAGNOSIS — G89.4 CHRONIC PAIN SYNDROME: Primary | ICD-10-CM

## 2024-03-06 PROCEDURE — 99214 OFFICE O/P EST MOD 30 MIN: CPT | Mod: S$GLB,,, | Performed by: NEUROLOGICAL SURGERY

## 2024-03-06 PROCEDURE — 99999 PR PBB SHADOW E&M-EST. PATIENT-LVL IV: CPT | Mod: PBBFAC,,, | Performed by: NEUROLOGICAL SURGERY

## 2024-03-06 NOTE — PROGRESS NOTES
NEUROSURGICAL PROGRESS NOTE    DATE OF SERVICE:  03/06/2024    ATTENDING PHYSICIAN:  Khadar Payne MD    SUBJECTIVE:  01/29/24  This is a very pleasant 68 y.o. male, liver transplant, chronic kidney disease stage 3, type 2 diabetes not well-controlled, status post C4-7 anterior cervical fusion for cervical spondylosis with myelopathy and radiculopathy performed by Dr. Christian on 08/03/2022.  Patient reports left arm pain and hand pain in the C8 distribution becoming progressively worse.  The pain was present before his spine surgery, did not improve after surgery and now has become worse.  His chronic gait imbalance has remained stable.  His bilateral hand numbness and hand weakness have remained stable.  He underwent a left C7-T1 transforaminal epidural steroid injection the pain management in May 2023 with no significant pain relief        INTERIM HISTORY:  03/06/24    Completed a CT and MRI of the cervical spine which did not show significant cord compression.  He has persistent severe foraminal stenosis at C4-5, C5-6, C6-7.  There is no significant foraminal stenosis at C3-4.  The patient complains of persistent left arm pain radiating down all his fingers and thumb on the left side.  Pain is associated with numbness.  He has difficulty using his left arm.  He reports some weakness.  His gait imbalance has remained quite stable.  The pain is affecting his quality of life and functional status.  Pain is constant.  He denies any change of temperature, edema, trophic changes.              PAST MEDICAL HISTORY:  Active Ambulatory Problems     Diagnosis Date Noted    Chronic pain syndrome 07/13/2011    Acquired hypothyroidism     History of hepatitis C, s/p successful Rx w/ [id number] (cure) - 5/2018 08/23/2011    Gout, unspecified 03/14/2011    Substance abuse 10/24/2010    Thrombocytopenia 06/16/2010    Anxiety     Lumbar radiculopathy 04/08/2015    Acquired spondylolisthesis 05/12/2015    Essential hypertension  06/19/2015    Type 2 diabetes mellitus with diabetic polyneuropathy, with long-term current use of insulin 10/04/2016    S/P liver transplant 10/04/2016    Hypertriglyceridemia 10/05/2016    CKD (chronic kidney disease), stage III     PAD (peripheral artery disease) 08/30/2017    Erectile dysfunction due to arterial insufficiency 10/09/2017    BPH with urinary obstruction 10/09/2017    Immunosuppressed status 10/16/2017    Low testosterone in male 07/25/2018    Hypertension associated with diabetes 12/01/2020    Hyperlipidemia associated with type 2 diabetes mellitus 12/01/2020    Erectile dysfunction associated with type 2 diabetes mellitus 12/01/2020    Claudication in peripheral vascular disease 12/01/2020    Aortic atherosclerosis 11/16/2010    Calcified granuloma of lung 10/22/2010    Carpal tunnel syndrome of left wrist 07/13/2021    Acute congestive heart failure 05/12/2023    Alcohol dependence, in remission 05/26/2023    Pulmonary emphysema, unspecified emphysema type 05/26/2023    Coronary artery disease involving native coronary artery of native heart with angina pectoris 08/24/2023    Epistaxis 12/04/2023     Resolved Ambulatory Problems     Diagnosis Date Noted    Abdominal pain, generalized 08/25/2011    Abdominal pain, right upper quadrant 09/16/2010    Abdominal tenderness, right upper quadrant 05/15/2011    Unspecified chronic liver disease without mention of alcohol     Hypertension     Acute alcoholic hepatitis 08/02/2010    Acute bronchitis 03/14/2011    Acute gouty arthropathy 08/28/2011    Aftercare following organ transplant 06/17/2010    Alcoholic cirrhosis of liver 10/11/2010    Anemia of other chronic disease 07/15/2010    Cholangitis 07/15/2010    Chronic hepatitis C with hepatic coma 06/08/2011    Cirrhosis of liver without mention of alcohol 07/28/2011    Complications of transplanted liver 05/11/2011    Dietary surveillance and counseling 07/28/2011    Encephalopathy, unspecified  06/16/2010    Family history of diabetes mellitus 07/15/2010    Hematuria, unspecified 02/11/2012    Hepatomegaly 08/23/2011    Liver replaced by transplant 08/23/2011    Encounter for long-term (current) use of steroids 06/02/2011    Encounter for long-term (current) use of aspirin 01/16/2011    Microscopic hematuria 05/11/2011    Mononeuritis of unspecified site 07/15/2010    Need for prophylactic immunotherapy 06/02/2011    Nocturia 11/02/2010    Alcohol abuse, in remission 06/02/2010    Obstruction of bile duct 10/24/2010    Open wound of finger(s) , without mention of complication 07/12/2011    Other and unspecified alcohol dependence, unspecified drinking behavior 01/16/2011    Other ascites 06/16/2010    Other cells and casts in urine 11/02/2010    Other sequelae of chronic liver disease 07/28/2011    Other specified disorders of biliary tract 10/23/2010    Other specified disorders of liver 09/26/2010    Peptic ulcer, unspecified site, unspecified as acute or chronic, without mention of hemorrhage, perforation, or obstruction 10/20/2010    Personal history of other infectious and parasitic disease 11/15/2010    Portal hypertension 07/26/2010    Secondary diabetes mellitus without mention of complication, uncontrolled 10/24/2010    Unspecified disorder of male genital organs 11/02/2010    Genital herpes, unspecified 06/02/2011    Orchitis and epididymitis, unspecified 10/12/2010    Unspecified viral hepatitis C without hepatic coma 10/20/2010    Hepatitis C 12/10/2012    Genital herpes     Hyperpigmentation 10/21/2014    Diskitis 01/15/2015    Lower back pain 01/15/2015    Discitis of lumbosacral region 01/16/2015    Lumbar discitis 06/15/2015    Lumbar myelopathy 06/18/2015    Abnormal gait 07/09/2015    Muscle weakness 07/09/2015    LBP (low back pain) 07/09/2015    S/P lumbar spinal fusion 08/11/2015    Hyponatremia 10/04/2016    OSMANY (acute kidney injury) 10/04/2016    Volume depletion 02/28/2017     Ischemic leg 08/30/2017    Sacroiliitis 12/02/2019    Abdominal pain 01/23/2020    SBO (small bowel obstruction)     Sepsis 11/11/2020    Transaminitis 11/11/2020    Right lower quadrant abdominal pain 11/12/2020    Diarrhea 11/13/2020    Severe obesity (BMI 35.0-39.9) with comorbidity 12/01/2020    Diabetic polyneuropathy associated with type 2 diabetes mellitus 12/01/2020    Penetrating foot wound 10/11/2021    Diabetic foot infection 10/11/2021    Left hand weakness 02/01/2022    Fine motor impairment 02/14/2022    Loss of feeling or sensation 02/14/2022    Myeloradiculopathy 08/22/2022    Chronic neck pain with history of cervical spinal surgery 01/10/2023    Hypomagnesemia 05/13/2023    NSTEMI (non-ST elevated myocardial infarction) 08/02/2023     Past Medical History:   Diagnosis Date    Anemia     Cataract     Diabetes mellitus type II, uncontrolled     ED (erectile dysfunction)     Encounter for blood transfusion     Gout, arthritis     History of alcohol abuse     History of positive PPD, treatment status unknown     History of substance abuse     Hypothyroidism     Pancreatitis 2016    Peptic ulcer disease        PAST SURGICAL HISTORY:  Past Surgical History:   Procedure Laterality Date    ANTERIOR CERVICAL DISCECTOMY W/ FUSION N/A 8/22/2022    Procedure: Procedure: ACDF 4-7 LOS: 4.0 Anesthesia: General Blood: Type & Screen Radiology: C-Arm Microscope: ------- SNS: EMG, SEP, MEP Brace: T[place]seh Bed: Regular Bed, Shoulder Strap Headrest:------ Position: Supine Equipment: Depuy;  Surgeon: Titi Christian MD;  Location: [facility] OR;  Service: Neurosurgery;  Laterality: N/A;  Depuy  confirmed CW 8/19  Neuro monitoring confirmed CW 8/19    CARPAL TUNNEL RELEASE Left 10/29/2021    Procedure: RELEASE, CARPAL TUNNEL;  Surgeon: Jameson Alejo Jr., MD;  Location: [facility] OR;  Service: Orthopedics;  Laterality: Left;    CHOLECYSTECTOMY      CORONARY ANGIOGRAPHY N/A 8/2/2023    Procedure: ANGIOGRAM, CORONARY ARTERY;   Surgeon: Juan Vera MD;  Location: [facility] CATH LAB/EP;  Service: Cardiology;  Laterality: N/A;    DECOMPRESSION OF CERVICAL SPINE BY ANTERIOR APPROACH WITH FUSION  8/22/2022    Procedure: DECOMPRESSION AND FUSION, SPINE, CERVICAL, ANTERIOR APPROACH;  Surgeon: Titi Christian MD;  Location: [facility] OR;  Service: Neurosurgery;;    INJECTION OF JOINT Right 12/2/2019    Procedure: INJECTION, JOINT SI;  Surgeon: Thu Penn MD;  Location: [facility], [place], operated by [facility]IN [facility]GT;  Service: Pain Management;  Laterality: Right;  RT SI JNT INJ    LEFT HEART CATHETERIZATION Left 8/2/2023    Procedure: Left heart cath;  Surgeon: Juan Vera MD;  Location: [facility] CATH LAB/EP;  Service: Cardiology;  Laterality: Left;    LIVER TRANSPLANT  06/2010    SPINE SURGERY      TRANSFORAMINAL EPIDURAL INJECTION OF STEROID Left 5/29/2023    Procedure: INJECTION, STEROID, EPIDURAL, TRANSFORAMINAL APPROACH,  LEFT C7-T1 DIRECT REF;  Surgeon: Thu Penn MD;  Location: [facility], [place], operated by [facility]IN [facility];  Service: Pain Management;  Laterality: Left;  4/3 MD ILL/PT WILL C/B       SOCIAL HISTORY:   Social History     Socioeconomic History    Marital status:    Tobacco Use    Smoking status: Former     Passive exposure: Never    Smokeless tobacco: Never   Substance and Sexual Activity    Alcohol use: No     Comment: over 5 years ago, none currently    Drug use: Not Currently     Comment: Former cocaine use    Sexual activity: Yes     Social Determinants of Health     Financial Resource Strain: Low Risk  (12/4/2023)    Overall Financial Resource Strain (CARDIA)     Difficulty of Paying Living Expenses: Not very hard   Food Insecurity: No Food Insecurity (12/4/2023)    Hunger Vital Sign     Worried About Running Out of Food in the Last Year: Never true     Ran Out of Food in the Last Year: Never true   Transportation Needs: No Transportation Needs (12/4/2023)    PRAPARE - Transportation     Lack of Transportation (Medical): No     Lack of Transportation (Non-Medical): No   Physical  Activity: Sufficiently Active (12/4/2023)    Exercise Vital Sign     Days of Exercise per Week: 3 days     Minutes of Exercise per Session: 90 min   Stress: No Stress Concern Present (12/4/2023)    [employer] [place] [employer] - Occupational Stress Questionnaire     Feeling of Stress : Only a little   Social Connections: Moderately Integrated (12/4/2023)    Social Connection and Isolation Panel [NHANES]     Frequency of Communication with Friends and Family: More than three times a week     Frequency of Social Gatherings with Friends and Family: More than three times a week     Attends Shinto Services: 1 to 4 times per year     Active Member of Clubs or Organizations: No     Attends Club or Organization Meetings: Never     Marital Status:    Housing Stability: Low Risk  (12/4/2023)    Housing Stability Vital Sign     Unable to Pay for Housing in the Last Year: No     Number of Places Lived in the Last Year: 1     Unstable Housing in the Last Year: No       FAMILY HISTORY:  Family History   Problem Relation Age of Onset    Cancer Mother     Diabetes Mother     Heart disease Mother     Diabetes Sister     Cancer Maternal Uncle 82        colon CA    Drug abuse Daughter     Melanoma Neg Hx     Psoriasis Neg Hx     Lupus Neg Hx     Eczema Neg Hx     Acne Neg Hx        CURRENTS MEDICATIONS:  Current Outpatient Medications on File Prior to Visit   Medication Sig Dispense Refill    albuterol (PROAIR HFA) 90 mcg/actuation inhaler Inhale 2 puffs into the lungs every 6 (six) hours as needed for Wheezing. Rescue 18 g 5    allopurinoL (ZYLOPRIM) 100 MG tablet TAKE 1 TABLET BY MOUTH TWICE A  tablet 3    aluminum-magnesium hydroxide-simethicone (MAALOX) 200-200-20 mg/5 mL Susp Take 30 mLs by mouth 4 (four) times daily before meals and nightly. (Patient taking differently: Take 30 mLs by mouth every 6 (six) hours as needed.) 769 mL 0    aspirin 81 MG Chew Take 1 tablet (81 mg total) by mouth once daily.  "90 tablet 3    atorvastatin (LIPITOR) 40 MG tablet Take 1 tablet (40 mg total) by mouth once daily. 90 tablet 3    blood-glucose meter,continuous (DEXCOM G7 ) Misc Use as directed. 1 each 11    blood-glucose sensor (DEXCOM G7 SENSOR) Viviane Use as directed. 3 each 11    blood-glucose transmitter (DEXCOM G6 TRANSMITTER) Viviane Use as directed 1 each 11    calcium carbonate-vitamin D3 (CALCIUM 600 WITH VITAMIN D3) 600 mg(1,500mg) -400 unit Chew Take 1 tablet by mouth once daily.       clopidogreL (PLAVIX) 75 mg tablet Take 1 tablet (75 mg total) by mouth once daily. 30 tablet 11    cyanocobalamin (VITAMIN B-12) 100 MCG tablet Take 100 mcg by mouth once daily.      diphenhydrAMINE (BENADRYL) 25 mg capsule Take 25 mg by mouth daily as needed for Allergies (Cold).      gabapentin (NEURONTIN) 800 MG tablet TAKE 1 TABLET BY MOUTH THREE TIMES A DAY 90 tablet 11    insulin glargine U-300 conc (TOUJEO MAX U-300 SOLOSTAR) 300 unit/mL (3 mL) insulin pen Inject 40 Units into the skin once daily. 3 mL 11    insulin lispro (HUMALOG KWIKPEN INSULIN) 100 unit/mL pen Inject 20 Units into the skin 3 (three) times daily with meals. 60 mL 1    isosorbide-hydrALAZINE 20-37.5 mg (BIDIL) 20-37.5 mg Tab Take 1 tablet by mouth 3 (three) times daily. 90 tablet 11    lancets 30 gauge Misc 1 lancet by Misc.(Non-Drug; Combo Route) route 4 (four) times daily before meals and nightly. 200 each 11    levothyroxine (SYNTHROID) 88 MCG tablet TAKE 1 TABLET BY MOUTH EVERY DAY 90 tablet 1    lisinopriL (PRINIVIL,ZESTRIL) 20 MG tablet Take 20 mg by mouth.      metoprolol succinate (TOPROL-XL) 200 MG 24 hr tablet TAKE 1 TABLET BY MOUTH EVERY DAY 90 tablet 3    multivit with min-folic acid (MEN'S MULTIVITAMIN GUMMIES) 200 mcg Chew Take 1 tablet by mouth once daily.      NIFEdipine (PROCARDIA-XL) 30 MG (OSM) 24 hr tablet Take 30 mg by mouth.      NOVOFINE PLUS 32 gauge x 1/6" Ndle       papaverine 30 mg/mL injection Tri-Mix - PGE (alprostadil) 10mcg, " papavarine 30mg, phentolamine 1mg; dispense 5mL vial, typical starting is 0.2 mL which is equal to 20 units (Patient taking differently: as needed. Tri-Mix - PGE (alprostadil) 10mcg, papavarine 30mg, phentolamine 1mg; dispense 5mL vial, typical starting is 0.2 mL which is equal to 20 units) 10 mL 5    sacubitriL-valsartan (ENTRESTO) 49-51 mg per tablet Take 1 tablet by mouth 2 (two) times daily. 60 tablet 3    tacrolimus (PROGRAF) 1 MG Cap Take 2 capsules (2 mg total) by mouth every morning AND 1 capsule (1 mg total) every evening. 90 capsule 11    tirzepatide (MOUNJARO) 2.5 mg/0.5 mL PnIj Inject 2.5 mg into the skin every 7 days. 4 Pen 11    traMADoL (ULTRAM) 50 mg tablet Take 1 tablet (50 mg total) by mouth every 6 (six) hours as needed for Pain (severe 7-10 pain only). 6 tablet 0    traZODone (DESYREL) 50 MG tablet TAKE 1 TABLET BY MOUTH EVERY DAY IN THE EVENING 90 tablet 3    TRUE METRIX GLUCOSE TEST STRIP Strp USE 3 TIMES DAILY TO TEST BLOOD GLUCOSE LEVEL 100 strip 11    blood-glucose meter Misc 1 Device by Misc.(Non-Drug; Combo Route) route 3 (three) times daily. 1 each 0    furosemide (LASIX) 20 MG tablet Take 2 tablets (40 mg total) by mouth daily as needed (For Weight Gain > 2-3 lbs in 1 day or 4-6 lbs over 1 week notify PCP and take 40 mg daily for three days). 60 tablet 0    [DISCONTINUED] insulin aspart U-100 (NOVOLOG FLEXPEN U-100 INSULIN) 100 unit/mL (3 mL) InPn pen Inject 20 Units into the skin 3 (three) times daily with meals. 15 mL 11     No current facility-administered medications on file prior to visit.       ALLERGIES:  Review of patient's allergies indicates:  No Known Allergies    REVIEW OF SYSTEMS:  Review of Systems   Constitutional:  Negative for diaphoresis, fever and weight loss.   Respiratory:  Negative for shortness of breath.    Cardiovascular:  Negative for chest pain.   Gastrointestinal:  Negative for blood in stool.   Genitourinary:  Negative for hematuria.   Endo/Heme/Allergies:   Does not bruise/bleed easily.   All other systems reviewed and are negative.        OBJECTIVE:    PHYSICAL EXAMINATION:   Vitals:    03/06/24 1610   BP: (!) 188/99   Pulse: 84       Physical Exam:  Vitals reviewed.    Constitutional: He appears well-developed and well-nourished.     Eyes: Pupils are equal, round, and reactive to light. Conjunctivae and EOM are normal.     Cardiovascular: Normal distal pulses and no edema.     Abdominal: Soft.     Skin: Skin displays no rash on trunk and no rash on extremities. Skin displays no lesions on trunk and no lesions on extremities.     Psych/Behavior: He is alert. He is oriented to person, place, and time. He has a normal mood and affect.     Musculoskeletal:        Neck: Range of motion is limited.     Neurological:        DTRs: Tricep reflexes are 0 on the right side and 0 on the left side. Bicep reflexes are 0 on the right side and 0 on the left side. Brachioradialis reflexes are 0 on the right side and 0 on the left side. Patellar reflexes are 0 on the right side and 0 on the left side. Achilles reflexes are 0 on the right side and 0 on the left side.       Back Exam     Muscle Strength   Right Quadriceps:  5/5   Left Quadriceps:  5/5   Right Hamstrings:  5/5   Left Hamstrings:  5/5             SI joint:   Palpation at the right and left SI joints not painful  HUBER test is negative bilaterally  Gaenslen test is negative bilaterally  Thigh thrust test is negative bilaterally    Neurologic Exam     Mental Status   Oriented to person, place, and time.   Speech: speech is normal   Level of consciousness: alert    Cranial Nerves   Cranial nerves II through XII intact.     CN III, IV, VI   Pupils are equal, round, and reactive to light.  Extraocular motions are normal.     Motor Exam   Muscle bulk: normal  Overall muscle tone: normal    Strength   Right deltoid: 5/5  Left deltoid: 5/5  Right biceps: 5/5  Left biceps: 5/5  Right triceps: 5/5  Left triceps: 5/5  Right wrist  flexion: 5/5  Left wrist flexion: 5/5  Right wrist extension: 5/5  Left wrist extension: 5/5  Right interossei: 4/5  Left interossei: 4/5  Right iliopsoas: 5/5  Left iliopsoas: 5/5  Right quadriceps: 5/5  Left quadriceps: 5/5  Right hamstrin/5  Left hamstrin/5  Right anterior tibial: 5/5  Left anterior tibial: 5/5  Right posterior tibial: 5/5  Left posterior tibial: 5/5  Right peroneal: 5/5  Left peroneal: 5/5  Right gastroc: 5/5  Left gastroc: 5/5    Sensory Exam   Light touch normal.   Pinprick normal.     Gait, Coordination, and Reflexes     Reflexes   Right brachioradialis: 0  Left brachioradialis: 0  Right biceps: 0  Left biceps: 0  Right triceps: 0  Left triceps: 0  Right patellar: 0  Left patellar: 0  Right achilles: 0  Left achilles: 0  Right plantar: normal  Left plantar: normal  Right Castro: absent  Left Castro: absent  Right ankle clonus: absent  Left ankle clonus: absent        DIAGNOSTIC DATA:  I personally interpreted the following imaging:   Repeat MRI of the cervical spine shows no cord compression, no intramedullary signal change  CT cervical spine shows consolidation of the fusion from C4-C7    ASSESSMENT:  This is a 68 y.o. male with     Problem List Items Addressed This Visit          Neuro    Chronic pain syndrome - Primary     Other Visit Diagnoses       Complex regional pain syndrome type 2 of left upper extremity                  PLAN:  We will complete a psychological evaluation for possible spinal cord stimulation trial.  If the patient is found to be an adequate candidate for spinal cord stimulation I would proceed with a paddle lead trial using a retrograde approach at C1-2 in order to improve the patient left arm pain.  Pain appears neuropathic.  I suspect that he has chronic radiculopathy causing this pain.  I do not think that further surgical decompression or fusion is necessary at this time.  The pain is constant and should respond to spinal cord stimulation.  All  questions answered.    More than 50% of the time was spent on discussing conservative management treatments (medication, physical therapy exercises) and possible interventions (spinal injections and surgical procedures). Care coordination was discussed.    30 min        Khadar Payne MD  Cell:[phone]

## 2024-03-07 ENCOUNTER — TELEPHONE (OUTPATIENT)
Dept: NEUROSURGERY | Facility: CLINIC | Age: 69
End: 2024-03-07
Payer: MEDICARE

## 2024-03-07 DIAGNOSIS — G56.42 COMPLEX REGIONAL PAIN SYNDROME TYPE 2 OF LEFT UPPER EXTREMITY: Primary | ICD-10-CM

## 2024-03-07 NOTE — TELEPHONE ENCOUNTER
Good Morning Gregory,  Can you assist with getting this patient scheduled for a psych eval.    Thanks,  MT

## 2024-03-08 ENCOUNTER — TELEPHONE (OUTPATIENT)
Dept: TRANSPLANT | Facility: CLINIC | Age: 69
End: 2024-03-08
Payer: MEDICARE

## 2024-03-08 DIAGNOSIS — K74.60 HEPATIC CIRRHOSIS, UNSPECIFIED HEPATIC CIRRHOSIS TYPE, UNSPECIFIED WHETHER ASCITES PRESENT: ICD-10-CM

## 2024-03-08 DIAGNOSIS — Z94.4 S/P LIVER TRANSPLANT: Primary | ICD-10-CM

## 2024-03-08 NOTE — LETTER
March 8, 2024    Vick Gonzalez  [address] [address]          Dear Vick Gonzalez:  MRN: [id number]    This is a follow up to your recent labs, your lab results were stable.  There are no medicine changes.  Please have your labs drawn again on 6/3/24.      If you cannot have your labs drawn on the scheduled date, it is your responsibility to call the transplant department to reschedule.  Please call [phone] and ask to speak to [facility]  for all scheduling requests.     Sincerely,    Kandy  Your Liver Transplant Coordinator    [facility]  [address] [place] [id number]  [phone]

## 2024-03-08 NOTE — TELEPHONE ENCOUNTER
Letter sent, labs stable and no medication changes are needed. Repeat labs due 6/3/24 per protocol.  ----- Message from James Coleman MD sent at 3/8/2024 12:42 PM CST -----  Results reviewed

## 2024-03-18 ENCOUNTER — PATIENT MESSAGE (OUTPATIENT)
Dept: TRANSPLANT | Facility: CLINIC | Age: 69
End: 2024-03-18
Payer: MEDICARE

## 2024-03-18 ENCOUNTER — TELEPHONE (OUTPATIENT)
Dept: TRANSPLANT | Facility: CLINIC | Age: 69
End: 2024-03-18
Payer: MEDICARE

## 2024-03-18 NOTE — TELEPHONE ENCOUNTER
Called patient to offer an appointment with Dr. Coleman on Monday, April 1. Left voice message and also sent [employer] message

## 2024-04-01 ENCOUNTER — TELEPHONE (OUTPATIENT)
Dept: TRANSPLANT | Facility: CLINIC | Age: 69
End: 2024-04-01

## 2024-04-01 ENCOUNTER — OFFICE VISIT (OUTPATIENT)
Dept: TRANSPLANT | Facility: CLINIC | Age: 69
End: 2024-04-01
Payer: MEDICARE

## 2024-04-01 VITALS
SYSTOLIC BLOOD PRESSURE: 157 MMHG | HEART RATE: 82 BPM | TEMPERATURE: 98 F | WEIGHT: 264.56 LBS | HEIGHT: 74 IN | OXYGEN SATURATION: 99 % | DIASTOLIC BLOOD PRESSURE: 92 MMHG | BODY MASS INDEX: 33.95 KG/M2

## 2024-04-01 DIAGNOSIS — Z94.4 S/P LIVER TRANSPLANT: Primary | ICD-10-CM

## 2024-04-01 PROCEDURE — 99999 PR PBB SHADOW E&M-EST. PATIENT-LVL V: CPT | Mod: PBBFAC,,, | Performed by: INTERNAL MEDICINE

## 2024-04-01 PROCEDURE — 99215 OFFICE O/P EST HI 40 MIN: CPT | Mod: S$GLB,,, | Performed by: INTERNAL MEDICINE

## 2024-04-01 RX ORDER — NIFEDIPINE 30 MG/1
30 TABLET, FILM COATED, EXTENDED RELEASE ORAL DAILY
Qty: 30 TABLET | Refills: 1 | Status: SHIPPED | OUTPATIENT
Start: 2024-04-01 | End: 2025-04-01

## 2024-04-01 NOTE — PROGRESS NOTES
Subjective:       Patient ID: Vick Gonzalez is a 68 y.o. male.    Chief Complaint: Liver Transplant Follow-up    HPI  I saw Vick Gonzalez today in the Post-Liver Transplant Clinic. This 68-year-old  gentleman is now 13 years and 9 months post liver transplant for end-stage liver disease due to hepatitis C. He has been doing well post transplant. He feels well with no symptoms of advanced chronic liver disease.    His last liver bx in Dec 2013 showed stage 1 fibrosis only.  Fibrosure showed F4 but his fibroscan in 2019 showed F2.    He is maintained on tacrolimus 2/1 mg daily..    Treated for HCV- ledip/sof and ribavirin but relapsed.  SVR with Epclusa + riba for  24 weeks.    Labs on 3/4/2024  LFTs- normal  Creatinine 1.3    BP managed by PCP  Body mass index is 33.97 kg/m².     Had neck surgery but still has numbness in his left arm  - will be seeing neurosurgeon again soon    Abdo US: 9/5/23  Satisfactory Doppler evaluation of the liver allograft.  Interval increase in resistive indices slightly above the upper limit of normal.  No tardus parvus waveforms.  Mild intrahepatic biliary duct dilatation.      Lab Results   Component Value Date    ALT 16 03/04/2024    AST 17 03/04/2024     (H) 04/24/2017    ALKPHOS 81 03/04/2024    BILITOT 0.2 03/04/2024       Review of Systems   Constitutional:  Negative for activity change, appetite change, fatigue, fever and unexpected weight change.   HENT: Negative.  Negative for ear pain, hearing loss, sinus pressure and tinnitus.    Eyes: Negative.  Negative for photophobia, discharge, itching and visual disturbance.   Respiratory: Negative.  Negative for cough, chest tightness and shortness of breath.    Cardiovascular:  Negative for chest pain, palpitations and leg swelling.   Gastrointestinal:  Negative for constipation, diarrhea, nausea and vomiting.   Genitourinary:  Negative for decreased urine volume, difficulty urinating, dysuria, frequency  (nocturia x 2) and urgency.   Musculoskeletal: Negative.  Negative for arthralgias, back pain, gait problem, joint swelling and myalgias.   Skin: Negative.    Neurological:  Negative for dizziness, seizures, numbness and headaches.   Hematological:  Negative for adenopathy. Does not bruise/bleed easily.   Psychiatric/Behavioral:  Negative for dysphoric mood and sleep disturbance. The patient is not nervous/anxious.          Objective:      Physical Exam  Constitutional:       Appearance: He is well-developed.   HENT:      Head: Normocephalic and atraumatic.   Eyes:      Conjunctiva/sclera: Conjunctivae normal.      Pupils: Pupils are equal, round, and reactive to light.   Neck:      Thyroid: No thyromegaly.   Cardiovascular:      Rate and Rhythm: Normal rate and regular rhythm.      Heart sounds: Normal heart sounds. No murmur heard.  Pulmonary:      Effort: Pulmonary effort is normal.      Breath sounds: Normal breath sounds. No wheezing.   Abdominal:      General: Bowel sounds are normal. There is no distension.      Palpations: Abdomen is soft. There is no mass.      Tenderness: There is no abdominal tenderness.   Musculoskeletal:      Cervical back: Normal range of motion.   Lymphadenopathy:      Cervical: No cervical adenopathy.   Skin:     General: Skin is warm.      Findings: No erythema or rash.   Neurological:      Mental Status: He is alert and oriented to person, place, and time.   Psychiatric:         Behavior: Behavior normal.         Assessment:       1. S/P liver transplant        Plan:   No immunosuppressive changes- continue tac 2/1 mg  Stage 2 fibrosis- some conflicting non-invasive testing     - reminded of the need to lose weight and given advice to cut down on carbs  - improve DM control  - labs every 3 months  - US every 6 months on the assumption that he may have significant liver fibrosis.    Clinic in 1 year.

## 2024-04-01 NOTE — TELEPHONE ENCOUNTER
Update received.   ----- Message from James Coleman MD sent at 4/1/2024  9:32 AM CDT -----  US in May 2024  Clinic in 1 year

## 2024-04-01 NOTE — LETTER
April 1, 2024        Emanuel Lopez  [address]  [address]  [place] [address]  Phone: [phone]  Fax: [phone]             [facility]  [address]  [facility] [address]  Phone: [phone]   Patient: Vick Gonzalez   MR Number: [id number]   YOB: 1955   Date of Visit: 4/1/2024       Dear Dr. Emanuel Lopez    Thank you for referring Vick Gonzalez to me for evaluation. Attached you will find relevant portions of my assessment and plan of care.    If you have questions, please do not hesitate to call me. I look forward to following Vick Gonzalez along with you.    Sincerely,    James Coleman MD    Enclosure    If you would like to receive this communication electronically, please contact [email or web address] or [phone] to request [employer] access.    [employer] is a tool which provides read-only access to select patient information with whom you have a relationship. Its easy to use and provides real time access to review your patients record including encounter summaries, notes, results, and demographic information.    If you feel you have received this communication in error or would no longer like to receive these types of communications, please e-mail [email or web address]

## 2024-04-10 RX ORDER — LEVOTHYROXINE SODIUM 88 UG/1
TABLET ORAL
Qty: 90 TABLET | Refills: 0 | Status: SHIPPED | OUTPATIENT
Start: 2024-04-10 | End: 2024-06-12

## 2024-04-10 NOTE — TELEPHONE ENCOUNTER
Provider Staff:  Action required for this patient    Requires labs      Please see care gap opportunities below in Care Due Message.    Thanks!  [facility]     Appointments      Date Provider   Last Visit   12/11/2023 Emanuel Lopez MD   Next Visit   Visit date not found Emanuel Lopez MD     Refill Decision Note   Vick Gonzalez  is requesting a refill authorization.  Brief Assessment and Rationale for Refill:  Approve     Medication Therapy Plan:         Comments:     Note composed:10:46 AM 04/10/2024

## 2024-04-10 NOTE — TELEPHONE ENCOUNTER
Care Due:                  Date            Visit Type   Department     Provider  --------------------------------------------------------------------------------                                [facility]  Last Visit: 12-      FOLLOW UP    MEDICINE       Emanuel Lopez  Next Visit: None Scheduled  None         None Found                                                            Last  Test          Frequency    Reason                     Performed    Due Date  --------------------------------------------------------------------------------    HBA1C.......  6 months...  insulin, tirzepatide.....  12- 06-    Uric Acid...  12 months..  allopurinoL..............  Not Found    Overdue    [employer] Embedded Care Due Messages. Reference number: [id number].   4/10/2024 12:41:42 AM CDT

## 2024-05-01 ENCOUNTER — TELEPHONE (OUTPATIENT)
Dept: FAMILY MEDICINE | Facility: CLINIC | Age: 69
End: 2024-05-01
Payer: MEDICARE

## 2024-05-01 NOTE — TELEPHONE ENCOUNTER
----- Message from Aydee Cintron sent at 5/1/2024 11:18 AM CDT -----  Type:  Same Day Appointment Request    Caller is requesting a same day appointment.  Caller declined first available appointment listed below.    Name of Caller:pt  When is the first available appointment?July   Symptoms:medication   Best Call Back Number:[phone]  Additional Information: pt requesting to have medication appointment.pharmacy denying script patient out of medication second attempt

## 2024-05-01 NOTE — TELEPHONE ENCOUNTER
Returned patient's call. He said that the pharmacy called his wife and told her that they couldn't fill one of his medications. He was unable to tell me which medication he needed. He said that he needs to see the doctor for pain and doesn't see Pain Management anymore. I scheduled him with Dr Lopez and told him to call the pharmacy and then let us know what medication he needs. He said he doesn't use the phone and I told him that he'll need to call or have someone else call because we can't submit a refill request without knowing the medication. He said he would have his wife call and then call us back.

## 2024-05-06 ENCOUNTER — OFFICE VISIT (OUTPATIENT)
Dept: FAMILY MEDICINE | Facility: CLINIC | Age: 69
End: 2024-05-06
Payer: MEDICARE

## 2024-05-06 VITALS
OXYGEN SATURATION: 97 % | HEART RATE: 97 BPM | DIASTOLIC BLOOD PRESSURE: 82 MMHG | SYSTOLIC BLOOD PRESSURE: 122 MMHG | WEIGHT: 266.56 LBS | HEIGHT: 74 IN | BODY MASS INDEX: 34.21 KG/M2

## 2024-05-06 DIAGNOSIS — I73.9 PAD (PERIPHERAL ARTERY DISEASE): Chronic | ICD-10-CM

## 2024-05-06 DIAGNOSIS — M54.16 LUMBAR RADICULOPATHY: ICD-10-CM

## 2024-05-06 DIAGNOSIS — G89.4 CHRONIC PAIN SYNDROME: Primary | ICD-10-CM

## 2024-05-06 DIAGNOSIS — F41.9 ANXIETY: ICD-10-CM

## 2024-05-06 DIAGNOSIS — N52.9 ERECTILE DYSFUNCTION, UNSPECIFIED ERECTILE DYSFUNCTION TYPE: ICD-10-CM

## 2024-05-06 PROCEDURE — 3072F LOW RISK FOR RETINOPATHY: CPT | Mod: CPTII,S$GLB,, | Performed by: FAMILY MEDICINE

## 2024-05-06 PROCEDURE — 1159F MED LIST DOCD IN RCRD: CPT | Mod: CPTII,S$GLB,, | Performed by: FAMILY MEDICINE

## 2024-05-06 PROCEDURE — 3288F FALL RISK ASSESSMENT DOCD: CPT | Mod: CPTII,S$GLB,, | Performed by: FAMILY MEDICINE

## 2024-05-06 PROCEDURE — 3074F SYST BP LT 130 MM HG: CPT | Mod: CPTII,S$GLB,, | Performed by: FAMILY MEDICINE

## 2024-05-06 PROCEDURE — 1101F PT FALLS ASSESS-DOCD LE1/YR: CPT | Mod: CPTII,S$GLB,, | Performed by: FAMILY MEDICINE

## 2024-05-06 PROCEDURE — 99999 PR PBB SHADOW E&M-EST. PATIENT-LVL V: CPT | Mod: PBBFAC,,, | Performed by: FAMILY MEDICINE

## 2024-05-06 PROCEDURE — 3079F DIAST BP 80-89 MM HG: CPT | Mod: CPTII,S$GLB,, | Performed by: FAMILY MEDICINE

## 2024-05-06 PROCEDURE — 1125F AMNT PAIN NOTED PAIN PRSNT: CPT | Mod: CPTII,S$GLB,, | Performed by: FAMILY MEDICINE

## 2024-05-06 PROCEDURE — 99214 OFFICE O/P EST MOD 30 MIN: CPT | Mod: S$GLB,,, | Performed by: FAMILY MEDICINE

## 2024-05-06 PROCEDURE — 3008F BODY MASS INDEX DOCD: CPT | Mod: CPTII,S$GLB,, | Performed by: FAMILY MEDICINE

## 2024-05-06 PROCEDURE — 4010F ACE/ARB THERAPY RXD/TAKEN: CPT | Mod: CPTII,S$GLB,, | Performed by: FAMILY MEDICINE

## 2024-05-07 RX ORDER — SILDENAFIL 50 MG/1
50 TABLET, FILM COATED ORAL DAILY PRN
Qty: 30 TABLET | Refills: 0 | Status: SHIPPED | OUTPATIENT
Start: 2024-05-07 | End: 2025-05-07

## 2024-05-07 NOTE — PROGRESS NOTES
(Portions of this note were dictated using voice recognition software and may contain dictation related errors in spelling/grammar/syntax not found on text review)    CC:   Chief Complaint   Patient presents with    Numbness     Left hand since two 2 years     Sexual Problem     sexual drive, he states current medication is not helping     Hip Pain     right    Dizziness       HPI: 68 y.o. male pt of dr Lopez presented with multiple complaints as a new patient to me.  He has medical history significant for CKD stage 3, status post liver transplant, chronic pain syndrome, lumbar radiculopathy, erectile dysfunction, pulmonary emphysema, peptic ulcer disease, generalized anxiety disorder, congestive heart failure.    Patient reports having chronic back pain it is from the lower back towards his both legs, also having leg pain due to peripheral arterial disease, he follows up with pain management as well as Neurosurgery.  Patient reports that he is to discuss nerve pain stimulator insertion as the pain is not controlled with the current management.    He was referred to Psychology by Neurosurgery before the procedure for counseling, patient states that he has been waiting but nobody contacted him to schedule an appointment.    He has feeling of imbalance and gait abnormality due to chronic back problems.  Patient is worried and states that he is tired of living like this.    He also wants to have script for Viagra, he was referred to urology by PCP for ED evaluation, had visit with Dr. Haro last year where he was given trimix injections, patient states that it is hard to do injection and does not last longer, he would like to start back on Viagra.  Chart review shows that he was given bidil as well as metoprolol in the past due to cardiac history, however, patient states that he has not been taking cardiac medications since the last year, blood pressure has been stable.    No other symptoms or concerns.    Past  Medical History:   Diagnosis Date    Acute congestive heart failure 5/12/2023    Anemia     Anxiety     Cataract     Chronic pain syndrome 07/13/2011    CKD (chronic kidney disease), stage III     Coronary artery disease involving native coronary artery of native heart with angina pectoris 8/24/2023    Diabetes mellitus type II, uncontrolled     Discitis of lumbosacral region 01/16/2015    ED (erectile dysfunction)     Encounter for blood transfusion     Genital herpes     Gout, arthritis     History of alcohol abuse     History of hepatitis C, s/p successful Rx w/ SVR24 (cure) - 5/2018 08/23/2011    Completed 24wks Epclusa + RBV w/SVR12 - 2/2018  -     History of positive PPD, treatment status unknown     Pulmonary granulomas, negative sputum cultures for AFB and indeterminate quantferon test    History of substance abuse     Hypertension     Hypothyroidism     Liver replaced by transplant 08/23/2011    DATE: 12/16/2013  LIVER BIOPSY : REASON:  hep C staging  PATHOLOGY COMMITTEE NOTE/PLAN: :grade  1 / stage 1        Pancreatitis 2016    Peptic ulcer disease     Pulmonary emphysema, unspecified emphysema type 5/26/2023       Past Surgical History:   Procedure Laterality Date    ANTERIOR CERVICAL DISCECTOMY W/ FUSION N/A 8/22/2022    Procedure: Procedure: ACDF 4-7 LOS: 4.0 Anesthesia: General Blood: Type & Screen Radiology: C-Arm Microscope: ------- SNS: EMG, SEP, MEP Brace: [place] Bed: Regular Bed, Shoulder Strap Headrest:------ Position: Supine Equipment: Depuy;  Surgeon: Titi Christian MD;  Location: [facility] OR;  Service: Neurosurgery;  Laterality: N/A;  Depuy  confirmed CW 8/19  Neuro monitoring confirmed CW 8/19    CARPAL TUNNEL RELEASE Left 10/29/2021    Procedure: RELEASE, CARPAL TUNNEL;  Surgeon: Jameson Alejo Jr., MD;  Location: [facility] OR;  Service: Orthopedics;  Laterality: Left;    CHOLECYSTECTOMY      CORONARY ANGIOGRAPHY N/A 8/2/2023    Procedure: ANGIOGRAM, CORONARY ARTERY;  Surgeon: Juan Vera MD;   Location: [facility] CATH LAB/EP;  Service: Cardiology;  Laterality: N/A;    DECOMPRESSION OF CERVICAL SPINE BY ANTERIOR APPROACH WITH FUSION  8/22/2022    Procedure: DECOMPRESSION AND FUSION, SPINE, CERVICAL, ANTERIOR APPROACH;  Surgeon: Titi Christian MD;  Location: [facility] OR;  Service: Neurosurgery;;    INJECTION OF JOINT Right 12/2/2019    Procedure: INJECTION, JOINT SI;  Surgeon: Thu Penn MD;  Location: [facility] PAIN MGT;  Service: Pain Management;  Laterality: Right;  RT SI JNT INJ    LEFT HEART CATHETERIZATION Left 8/2/2023    Procedure: Left heart cath;  Surgeon: Juan Vera MD;  Location: [facility] CATH LAB/EP;  Service: Cardiology;  Laterality: Left;    LIVER TRANSPLANT  06/2010    SPINE SURGERY      TRANSFORAMINAL EPIDURAL INJECTION OF STEROID Left 5/29/2023    Procedure: INJECTION, STEROID, EPIDURAL, TRANSFORAMINAL APPROACH,  LEFT C7-T1 DIRECT REF;  Surgeon: Thu Penn MD;  Location: [facility] PAIN MGT;  Service: Pain Management;  Laterality: Left;  4/3 MD ILL/PT WILL C/B       Family History   Problem Relation Name Age of Onset    Cancer Mother      Diabetes Mother      Heart disease Mother      Diabetes Sister      Cancer Maternal Uncle  82        colon CA    Drug abuse Daughter      Melanoma Neg Hx      Psoriasis Neg Hx      Lupus Neg Hx      Eczema Neg Hx      Acne Neg Hx         Social History     Tobacco Use    Smoking status: Former     Passive exposure: Never    Smokeless tobacco: Never   Substance Use Topics    Alcohol use: No     Comment: over 5 years ago, none currently    Drug use: Not Currently     Comment: Former cocaine use       Lab Results   Component Value Date    WBC 7.77 03/04/2024    HGB 12.3 (L) 03/04/2024    HCT 38.8 (L) 03/04/2024    MCV 90 03/04/2024     03/04/2024    CHOL 143 08/03/2023    TRIG 234 (H) 08/03/2023    HDL 21 (L) 08/03/2023    ALT 16 03/04/2024    AST 17 03/04/2024    BILITOT 0.2 03/04/2024    ALKPHOS 81 03/04/2024     03/04/2024    K 3.8 03/04/2024      03/04/2024    CREATININE 1.3 03/04/2024    ESTGFRAFRICA >60.0 07/25/2022    EGFRNONAA 52.7 (A) 07/25/2022    CALCIUM 9.5 03/04/2024    ALBUMIN 3.8 03/04/2024    BUN 16 03/04/2024    CO2 26 03/04/2024    TSH 1.288 05/13/2023    PSA 0.22 01/19/2022    PSADIAG 0.28 10/09/2017    INR 1.0 12/04/2023    HGBA1C 8.5 (H) 12/04/2023    MICALBCREAT 30.8 (H) 08/16/2022    LDLCALC 75.2 08/03/2023     03/04/2024    LHXMRRXK02PB 30 10/11/2021             Vital signs reviewed  PE:   APPEARANCE:In no acute distress.    HEAD: Normocephalic, atraumatic.  EYES: EOMI.  Conjunctivae noninjected.  NOSE: Mucosa pink. Airway clear.  MOUTH & THROAT: No tonsillar enlargement. No pharyngeal erythema or exudate.   NECK: Supple with no cervical lymphadenopathy.    CHEST: Good inspiratory effort. Lungs clear to auscultation with no wheezes or crackles.  CARDIOVASCULAR: Normal S1, S2. No rubs, murmurs, or gallops.  ABDOMEN: Bowel sounds normal. Not distended. Soft. No tenderness or masses. No organomegaly.  EXTREMITIES: No edema, cyanosis, or clubbing.    Review of Systems   HENT: Negative.     Respiratory: Negative.     Cardiovascular: Negative.    Gastrointestinal: Negative.    Musculoskeletal: Negative.    Integumentary:  Negative.   Neurological:  Positive for numbness and coordination difficulties.   Psychiatric/Behavioral: Negative.         IMPRESSION  1. Chronic pain syndrome    2. Erectile dysfunction, unspecified erectile dysfunction type    3. Lumbar radiculopathy    4. Anxiety    5. PAD (peripheral artery disease)            PLAN      1. Erectile dysfunction, unspecified erectile dysfunction type    - sildenafiL (VIAGRA) 50 MG tablet; Take 1 tablet (50 mg total) by mouth daily as needed for Erectile Dysfunction.  Dispense: 30 tablet; Refill: 0      2. Lumbar radiculopathy      3. Chronic pain syndrome    Suspect most of his chronic balance problems are due to chronic back problems, advised to follow up with Neurosurgery and  Neurology for further management    Continue gabapentin 800 mg t.i.d.      4. Anxiety    Encouraged her to follow up with Psychology for counseling    Discussed referral to the psychiatrist on initiation of medication for anxiety management, patient is not interested in starting         5. PAD (peripheral artery disease)     Continue current medications nodule it off      Age/demographic appropriate health maintenance:    Health Maintenance Due   Topic Date Due    RSV Vaccine (Age 60+ and Pregnant patients) (1 - 1-dose 60+ series) Never done    Abdominal Aortic Aneurysm Screening  12/14/2022    Diabetes Urine Screening  08/16/2023    COVID-19 Vaccine (4 - 2023-24 season) 09/01/2023    Eye Exam  01/13/2024    Hemoglobin A1c  06/04/2024           Spent adequate time in obtaining history and explaining differentials     30 minutes spent during this visit of which greater than 50% devoted to face-face counseling and coordination of care regarding diagnosis and management plan       Jayson Winters   5/7/2024

## 2024-05-08 ENCOUNTER — HOSPITAL ENCOUNTER (OUTPATIENT)
Dept: RADIOLOGY | Facility: HOSPITAL | Age: 69
Discharge: HOME | End: 2024-05-08
Attending: INTERNAL MEDICINE
Payer: MEDICARE

## 2024-05-08 DIAGNOSIS — Z94.4 S/P LIVER TRANSPLANT: ICD-10-CM

## 2024-05-08 PROCEDURE — 76705 ECHO EXAM OF ABDOMEN: CPT | Mod: TC

## 2024-05-08 PROCEDURE — 76705 ECHO EXAM OF ABDOMEN: CPT | Mod: 26,59,, | Performed by: RADIOLOGY

## 2024-05-08 PROCEDURE — 93976 VASCULAR STUDY: CPT | Mod: 26,,, | Performed by: RADIOLOGY

## 2024-05-09 ENCOUNTER — TELEPHONE (OUTPATIENT)
Dept: TRANSPLANT | Facility: CLINIC | Age: 69
End: 2024-05-09
Payer: MEDICARE

## 2024-05-09 NOTE — LETTER
May 9, 2024    Vick Gonzalez  [facility] [facility] [address]          Dear Vick Gonzalez,  MRN: [id number]    Dr. Coleman reviewed your liver transplant ultrasound. He said it was stable. A repeat   ultrasound is due in 6 months - November 2024.    If you have any questions or concerns, please let us know.    Sincerely,    [facility]  [facility] [place] [address]  [phone]

## 2024-05-09 NOTE — TELEPHONE ENCOUNTER
Letter sent, ultrasound stable  ----- Message from James Coleman MD sent at 5/9/2024  1:42 PM CDT -----  Results reviewed

## 2024-05-14 RX ORDER — METOPROLOL SUCCINATE 200 MG/1
200 TABLET, EXTENDED RELEASE ORAL DAILY
Qty: 90 TABLET | Refills: 3 | Status: SHIPPED | OUTPATIENT
Start: 2024-05-14

## 2024-05-23 NOTE — PROGRESS NOTES
[facility] - Urology   Clinic Note    SUBJECTIVE:     No chief complaint on file.      Referral from: No ref. provider found.    History of Present Illness:  Vick Gonzalez is a 68 y.o. male who presents to clinic for erectile dysfunction.    Patient here with complaints of erectile dysfunction.  He endorses difficulty getting and maintaining erections.  He is not able to reliably achieve a strong enough erection for intercourse.  Patient does get erections with TriMix however he reports that he is tired of sticking himself.  His last A1c was 7.8.  By before that it was greater than 9.  He is a liver transplant patient and is currently on clopidogrel    TROY: 5/25    Previous ED treatment: yes, trimix, cialis, trimix works well.    05/24/2024  Was planning to do tri-mix which was prescribed at last visit  No longer wants to do TriMix.  Is very adamant that he does not take TriMix.  He does not want to stick himself and says that he took a Viagra given to him by a friend and it worked well.    Patient endorses no additional complaints at this time.    Past Medical History:   Diagnosis Date    Acute congestive heart failure 5/12/2023    Anemia     Anxiety     Cataract     Chronic pain syndrome 07/13/2011    CKD (chronic kidney disease), stage III     Coronary artery disease involving native coronary artery of native heart with angina pectoris 8/24/2023    Diabetes mellitus type II, uncontrolled     Discitis of lumbosacral region 01/16/2015    ED (erectile dysfunction)     Encounter for blood transfusion     Genital herpes     Gout, arthritis     History of alcohol abuse     History of hepatitis C, s/p successful Rx w/ SVR24 (cure) - 5/2018 08/23/2011    Completed 24wks Epclusa + RBV w/SVR12 - 2/2018  -     History of positive PPD, treatment status unknown     Pulmonary granulomas, negative sputum cultures for AFB and indeterminate quantferon test    History of substance abuse     Hypertension     Hypothyroidism      Liver replaced by transplant 08/23/2011    DATE: 12/16/2013  LIVER BIOPSY : REASON:  hep C staging  PATHOLOGY COMMITTEE NOTE/PLAN: :grade  1 / stage 1        Pancreatitis 2016    Peptic ulcer disease     Pulmonary emphysema, unspecified emphysema type 5/26/2023       Past Surgical History:   Procedure Laterality Date    ANTERIOR CERVICAL DISCECTOMY W/ FUSION N/A 8/22/2022    Procedure: Procedure: ACDF 4-7 LOS: 4.0 Anesthesia: General Blood: Type & Screen Radiology: C-Arm Microscope: ------- SNS: EMG, SEP, MEP Brace: Hudson Bed: Regular Bed, Shoulder Strap Headrest:------ Position: Supine Equipment: Depuy;  Surgeon: Titi Christian MD;  Location: [facility] OR;  Service: Neurosurgery;  Laterality: N/A;  Depuy  confirmed CW 8/19  Neuro monitoring confirmed CW 8/19    CARPAL TUNNEL RELEASE Left 10/29/2021    Procedure: RELEASE, CARPAL TUNNEL;  Surgeon: Jameson Alejo Jr., MD;  Location: [facility] OR;  Service: Orthopedics;  Laterality: Left;    CHOLECYSTECTOMY      CORONARY ANGIOGRAPHY N/A 8/2/2023    Procedure: ANGIOGRAM, CORONARY ARTERY;  Surgeon: Juan Vera MD;  Location: [facility] CATH LAB/EP;  Service: Cardiology;  Laterality: N/A;    DECOMPRESSION OF CERVICAL SPINE BY ANTERIOR APPROACH WITH FUSION  8/22/2022    Procedure: DECOMPRESSION AND FUSION, SPINE, CERVICAL, ANTERIOR APPROACH;  Surgeon: Titi Christian MD;  Location: [facility] OR;  Service: Neurosurgery;;    INJECTION OF JOINT Right 12/2/2019    Procedure: INJECTION, JOINT SI;  Surgeon: Tuh Penn MD;  Location: [facility] PAIN MGT;  Service: Pain Management;  Laterality: Right;  RT SI JNT INJ    LEFT HEART CATHETERIZATION Left 8/2/2023    Procedure: Left heart cath;  Surgeon: Juan Vera MD;  Location: [facility] CATH LAB/EP;  Service: Cardiology;  Laterality: Left;    LIVER TRANSPLANT  06/2010    SPINE SURGERY      TRANSFORAMINAL EPIDURAL INJECTION OF STEROID Left 5/29/2023    Procedure: INJECTION, STEROID, EPIDURAL, TRANSFORAMINAL APPROACH,  LEFT C7-T1 DIRECT REF;   Surgeon: Thu Penn MD;  Location: [facility] PAIN MGT;  Service: Pain Management;  Laterality: Left;  4/3 MD ILL/PT WILL C/B       Family History   Problem Relation Name Age of Onset    Cancer Mother      Diabetes Mother      Heart disease Mother      Diabetes Sister      Cancer Maternal Uncle  82        colon CA    Drug abuse Daughter      Melanoma Neg Hx      Psoriasis Neg Hx      Lupus Neg Hx      Eczema Neg Hx      Acne Neg Hx         Social History     Tobacco Use    Smoking status: Former     Passive exposure: Never    Smokeless tobacco: Never   Substance Use Topics    Alcohol use: No     Comment: over 5 years ago, none currently    Drug use: Not Currently     Comment: Former cocaine use       Current Outpatient Medications on File Prior to Visit   Medication Sig Dispense Refill    albuterol (PROAIR HFA) 90 mcg/actuation inhaler Inhale 2 puffs into the lungs every 6 (six) hours as needed for Wheezing. Rescue 18 g 5    allopurinoL (ZYLOPRIM) 100 MG tablet TAKE 1 TABLET BY MOUTH TWICE A  tablet 3    aluminum-magnesium hydroxide-simethicone (MAALOX) 200-200-20 mg/5 mL Susp Take 30 mLs by mouth 4 (four) times daily before meals and nightly. (Patient taking differently: Take 30 mLs by mouth every 6 (six) hours as needed.) 769 mL 0    aspirin 81 MG Chew Take 1 tablet (81 mg total) by mouth once daily. 90 tablet 3    atorvastatin (LIPITOR) 40 MG tablet Take 1 tablet (40 mg total) by mouth once daily. 90 tablet 3    blood-glucose meter Misc 1 Device by Misc.(Non-Drug; Combo Route) route 3 (three) times daily. 1 each 0    blood-glucose meter,continuous (DEXCOM G7 ) Misc Use as directed. 1 each 11    blood-glucose sensor (DEXCOM G7 SENSOR) Viviane Use as directed. 3 each 11    blood-glucose transmitter (DEXCOM G6 TRANSMITTER) Viviane Use as directed 1 each 11    calcium carbonate-vitamin D3 (CALCIUM 600 WITH VITAMIN D3) 600 mg(1,500mg) -400 unit Chew Take 1 tablet by mouth once daily.       clopidogreL (PLAVIX) 75  "mg tablet Take 1 tablet (75 mg total) by mouth once daily. 30 tablet 11    cyanocobalamin (VITAMIN B-12) 100 MCG tablet Take 100 mcg by mouth once daily.      diphenhydrAMINE (BENADRYL) 25 mg capsule Take 25 mg by mouth daily as needed for Allergies (Cold).      furosemide (LASIX) 20 MG tablet Take 2 tablets (40 mg total) by mouth daily as needed (For Weight Gain > 2-3 lbs in 1 day or 4-6 lbs over 1 week notify PCP and take 40 mg daily for three days). (Patient not taking: Reported on 4/1/2024) 60 tablet 0    gabapentin (NEURONTIN) 800 MG tablet TAKE 1 TABLET BY MOUTH THREE TIMES A DAY 90 tablet 11    insulin glargine U-300 conc (TOUJEO MAX U-300 SOLOSTAR) 300 unit/mL (3 mL) insulin pen Inject 40 Units into the skin once daily. 3 mL 11    insulin lispro (HUMALOG KWIKPEN INSULIN) 100 unit/mL pen Inject 20 Units into the skin 3 (three) times daily with meals. 60 mL 1    lancets 30 gauge Misc 1 lancet by Misc.(Non-Drug; Combo Route) route 4 (four) times daily before meals and nightly. 200 each 11    levothyroxine (SYNTHROID) 88 MCG tablet TAKE 1 TABLET BY MOUTH EVERY DAY 90 tablet 0    lisinopriL (PRINIVIL,ZESTRIL) 20 MG tablet Take 20 mg by mouth.      metoprolol succinate (TOPROL-XL) 200 MG 24 hr tablet Take 1 tablet (200 mg total) by mouth once daily. 90 tablet 3    multivit with min-folic acid (MEN'S MULTIVITAMIN GUMMIES) 200 mcg Chew Take 1 tablet by mouth once daily.      NIFEdipine (ADALAT CC) 30 MG TbSR Take 1 tablet (30 mg total) by mouth once daily. 30 tablet 1    NOVOFINE PLUS 32 gauge x 1/6" Ndle       papaverine 30 mg/mL injection Tri-Mix - PGE (alprostadil) 10mcg, papavarine 30mg, phentolamine 1mg; dispense 5mL vial, typical starting is 0.2 mL which is equal to 20 units (Patient taking differently: as needed. Tri-Mix - PGE (alprostadil) 10mcg, papavarine 30mg, phentolamine 1mg; dispense 5mL vial, typical starting is 0.2 mL which is equal to 20 units) 10 mL 5    sacubitriL-valsartan (ENTRESTO) 49-51 mg per " "tablet Take 1 tablet by mouth 2 (two) times daily. 60 tablet 3    sildenafiL (VIAGRA) 50 MG tablet Take 1 tablet (50 mg total) by mouth daily as needed for Erectile Dysfunction. 30 tablet 0    tacrolimus (PROGRAF) 1 MG Cap Take 2 capsules (2 mg total) by mouth every morning AND 1 capsule (1 mg total) every evening. 90 capsule 11    tirzepatide (MOUNJARO) 2.5 mg/0.5 mL PnIj Inject 2.5 mg into the skin every 7 days. 4 Pen 11    traMADoL (ULTRAM) 50 mg tablet Take 1 tablet (50 mg total) by mouth every 6 (six) hours as needed for Pain (severe 7-10 pain only). 6 tablet 0    traZODone (DESYREL) 50 MG tablet TAKE 1 TABLET BY MOUTH EVERY DAY IN THE EVENING 90 tablet 3    TRUE METRIX GLUCOSE TEST STRIP Strp USE 3 TIMES DAILY TO TEST BLOOD GLUCOSE LEVEL 100 strip 11    [DISCONTINUED] insulin aspart U-100 (NOVOLOG FLEXPEN U-100 INSULIN) 100 unit/mL (3 mL) InPn pen Inject 20 Units into the skin 3 (three) times daily with meals. 15 mL 11     No current facility-administered medications on file prior to visit.       Review of patient's allergies indicates:  No Known Allergies    Review of Systems:  A review of 10+ systems was conducted with pertinent positive and negative findings documented in HPI with all other systems reviewed and negative.    OBJECTIVE:     Estimated body mass index is 34.22 kg/m² as calculated from the following:    Height as of 5/6/24: 6' 2" (1.88 m).    Weight as of 5/6/24: 120.9 kg (266 lb 8.6 oz).    Vital Signs (Most Recent)  There were no vitals filed for this visit.      Physical Exam:  GENERAL: patient sitting comfortably  HEENT: normocephalic  NECK: supple, no JVD  PULM: normal chest rise, no increased WOB  HEART: non-diaphoretic  ABDO: soft, nondistended, nontender  BACK: no CVA tenderness bilaterally  SKIN: warm, dry, well perfused  EXT: no bruising or edema  NEURO: grossly normal with no focal deficits  PSYCH: appropriate mood and affect    Genitourinary Exam:  deferred    Lab Results   Component " Value Date    BUN 16 03/04/2024    CREATININE 1.3 03/04/2024    WBC 7.77 03/04/2024    HGB 12.3 (L) 03/04/2024    HCT 38.8 (L) 03/04/2024     03/04/2024    AST 17 03/04/2024    ALT 16 03/04/2024    ALKPHOS 81 03/04/2024    ALBUMIN 3.8 03/04/2024    HGBA1C 8.5 (H) 12/04/2023    INR 1.0 12/04/2023        Imaging:   I have personally reviewed all relevant imaging studies.    No results found. However, due to the size of the patient record, not all encounters were searched. Please check Results Review for a complete set of results.  No results found. However, due to the size of the patient record, not all encounters were searched. Please check Results Review for a complete set of results.  US Doppler Liver Transplant Post (xpd)  Narrative: EXAMINATION:  US DOPPLER LIVER TRANSPLANT POST (XPD)    CLINICAL HISTORY:  Liver transplant status    TECHNIQUE:  Ultrasound of the transplant liver with Doppler evaluation.  Color and spectral Doppler were performed.    Grayscale and color Doppler ultrasound of ventral abdominal incision was performed.    COMPARISON:  Ultrasound Doppler liver transplant post 09/05/2023, 05/13/2023    FINDINGS:  Patient is status post orthotopic liver transplant in 2009.  The liver demonstrates homogeneous echotexture.  No focal hepatic lesions are seen.  No fluid collections.    The common duct is not dilated, measuring 8 mm, similar to ultrasound 05/13/2023.  Stable mildly dilated intrahepatic ducts are seen.    Color flow and spectral waveform analysis was performed.  The main portal vein, right portal vein, left portal vein, middle hepatic vein, right hepatic vein, left hepatic vein, and IVC are patent with proper directional flow.    Anastomosis site of the main hepatic artery demonstrates a peak systolic velocity 77 cm/sec, previously 72 cm/s.    Main hepatic artery demonstrates resistive index 0.78 with normal waveform, previously 0.85.    Left hepatic artery shows resistive index 0.81  with normal waveform, previously 0.81.    Anterior branch of the right hepatic artery demonstrates resistive index 0.84 with normal waveform, previously 0.86.    Posterior branch of the right hepatic artery demonstrates resistive index 0.77 with normal waveform, previously 0.76.    In the patient's area of concern at the ventral abdominal wall incision there is prominent subcutaneous adipose tissue.  No focal mass lesion or organized fluid collection.  Impression: Satisfactory Doppler evaluation of the liver allograft.    Electronically signed by resident: Mendy Plummer  Date:    05/08/2024  Time:    16:09    Electronically signed by: Yash Bermeo MD  Date:    05/08/2024  Time:    16:26       PSA:  Lab Results   Component Value Date    PSA 0.22 01/19/2022    PSA 0.18 09/01/2020    PSA 0.30 09/06/2016    PSA 0.80 07/13/2012    PSA 0.53 05/12/2011    PSA 0.16 05/25/2010    PSA 0.09 12/15/2008    PSA 0.1 10/05/2006    PSADIAG 0.28 10/09/2017       Testosterone:  Lab Results   Component Value Date    TOTALTESTOST 168 (L) 09/07/2018    TESTOSTERONE 187 (L) 07/25/2018    TESTOSTERONE 30.0 (L) 07/25/2018        ASSESSMENT     1. Erectile dysfunction, unspecified erectile dysfunction type          PLAN:     No nitrates in his chart.  We will prescribe sildenafil 100 mg p.r.n..  Can follow up with MITCHEL as needed.    Jacques Haro MD  Urology  [facility]    Disclaimer: This note has been generated using voice-recognition software. There may be typographical errors that have been missed during proof-reading.

## 2024-05-24 ENCOUNTER — OFFICE VISIT (OUTPATIENT)
Dept: UROLOGY | Facility: CLINIC | Age: 69
End: 2024-05-24
Payer: MEDICARE

## 2024-05-24 VITALS
WEIGHT: 263.88 LBS | HEIGHT: 74 IN | SYSTOLIC BLOOD PRESSURE: 146 MMHG | BODY MASS INDEX: 33.86 KG/M2 | HEART RATE: 87 BPM | DIASTOLIC BLOOD PRESSURE: 92 MMHG

## 2024-05-24 DIAGNOSIS — N52.9 ERECTILE DYSFUNCTION, UNSPECIFIED ERECTILE DYSFUNCTION TYPE: Primary | ICD-10-CM

## 2024-05-24 PROCEDURE — 1100F PTFALLS ASSESS-DOCD GE2>/YR: CPT | Mod: CPTII,S$GLB,, | Performed by: UROLOGY

## 2024-05-24 PROCEDURE — 1159F MED LIST DOCD IN RCRD: CPT | Mod: CPTII,S$GLB,, | Performed by: UROLOGY

## 2024-05-24 PROCEDURE — 99214 OFFICE O/P EST MOD 30 MIN: CPT | Mod: S$GLB,,, | Performed by: UROLOGY

## 2024-05-24 PROCEDURE — 99999 PR PBB SHADOW E&M-EST. PATIENT-LVL IV: CPT | Mod: PBBFAC,,, | Performed by: UROLOGY

## 2024-05-24 PROCEDURE — 3008F BODY MASS INDEX DOCD: CPT | Mod: CPTII,S$GLB,, | Performed by: UROLOGY

## 2024-05-24 PROCEDURE — 3080F DIAST BP >= 90 MM HG: CPT | Mod: CPTII,S$GLB,, | Performed by: UROLOGY

## 2024-05-24 PROCEDURE — 4010F ACE/ARB THERAPY RXD/TAKEN: CPT | Mod: CPTII,S$GLB,, | Performed by: UROLOGY

## 2024-05-24 PROCEDURE — 3072F LOW RISK FOR RETINOPATHY: CPT | Mod: CPTII,S$GLB,, | Performed by: UROLOGY

## 2024-05-24 PROCEDURE — 3288F FALL RISK ASSESSMENT DOCD: CPT | Mod: CPTII,S$GLB,, | Performed by: UROLOGY

## 2024-05-24 PROCEDURE — 3077F SYST BP >= 140 MM HG: CPT | Mod: CPTII,S$GLB,, | Performed by: UROLOGY

## 2024-05-24 PROCEDURE — 1126F AMNT PAIN NOTED NONE PRSNT: CPT | Mod: CPTII,S$GLB,, | Performed by: UROLOGY

## 2024-05-24 RX ORDER — SILDENAFIL 100 MG/1
100 TABLET, FILM COATED ORAL DAILY PRN
Qty: 30 TABLET | Refills: 11 | Status: SHIPPED | OUTPATIENT
Start: 2024-05-24 | End: 2025-05-24

## 2024-05-28 NOTE — TELEPHONE ENCOUNTER
Re-faxed last page of notes with signature to Cami at [facility].   134

## 2024-06-03 ENCOUNTER — LAB VISIT (OUTPATIENT)
Dept: LAB | Facility: HOSPITAL | Age: 69
End: 2024-06-03
Attending: INTERNAL MEDICINE
Payer: MEDICARE

## 2024-06-03 DIAGNOSIS — Z94.4 S/P LIVER TRANSPLANT: ICD-10-CM

## 2024-06-03 DIAGNOSIS — K74.60 HEPATIC CIRRHOSIS, UNSPECIFIED HEPATIC CIRRHOSIS TYPE, UNSPECIFIED WHETHER ASCITES PRESENT: ICD-10-CM

## 2024-06-03 LAB
AFP SERPL-MCNC: 3.9 NG/ML (ref 0–8.4)
ALBUMIN SERPL BCP-MCNC: 3.6 G/DL (ref 3.5–5.2)
ALP SERPL-CCNC: 73 U/L (ref 55–135)
ALT SERPL W/O P-5'-P-CCNC: 19 U/L (ref 10–44)
ANION GAP SERPL CALC-SCNC: 12 MMOL/L (ref 8–16)
AST SERPL-CCNC: 16 U/L (ref 10–40)
BASOPHILS # BLD AUTO: 0.03 K/UL (ref 0–0.2)
BASOPHILS NFR BLD: 0.4 % (ref 0–1.9)
BILIRUB SERPL-MCNC: 0.3 MG/DL (ref 0.1–1)
BUN SERPL-MCNC: 16 MG/DL (ref 8–23)
CALCIUM SERPL-MCNC: 8.7 MG/DL (ref 8.7–10.5)
CHLORIDE SERPL-SCNC: 106 MMOL/L (ref 95–110)
CO2 SERPL-SCNC: 22 MMOL/L (ref 23–29)
CREAT SERPL-MCNC: 1.2 MG/DL (ref 0.5–1.4)
DIFFERENTIAL METHOD BLD: ABNORMAL
EOSINOPHIL # BLD AUTO: 0.5 K/UL (ref 0–0.5)
EOSINOPHIL NFR BLD: 7.3 % (ref 0–8)
ERYTHROCYTE [DISTWIDTH] IN BLOOD BY AUTOMATED COUNT: 14.5 % (ref 11.5–14.5)
EST. GFR  (NO RACE VARIABLE): >60 ML/MIN/1.73 M^2
GLUCOSE SERPL-MCNC: 193 MG/DL (ref 70–110)
HCT VFR BLD AUTO: 40.2 % (ref 40–54)
HGB BLD-MCNC: 12.7 G/DL (ref 14–18)
IMM GRANULOCYTES # BLD AUTO: 0.02 K/UL (ref 0–0.04)
IMM GRANULOCYTES NFR BLD AUTO: 0.3 % (ref 0–0.5)
LYMPHOCYTES # BLD AUTO: 2.5 K/UL (ref 1–4.8)
LYMPHOCYTES NFR BLD: 34.1 % (ref 18–48)
MCH RBC QN AUTO: 28.6 PG (ref 27–31)
MCHC RBC AUTO-ENTMCNC: 31.6 G/DL (ref 32–36)
MCV RBC AUTO: 91 FL (ref 82–98)
MONOCYTES # BLD AUTO: 0.6 K/UL (ref 0.3–1)
MONOCYTES NFR BLD: 7.7 % (ref 4–15)
NEUTROPHILS # BLD AUTO: 3.7 K/UL (ref 1.8–7.7)
NEUTROPHILS NFR BLD: 50.2 % (ref 38–73)
NRBC BLD-RTO: 0 /100 WBC
PLATELET # BLD AUTO: 129 K/UL (ref 150–450)
PMV BLD AUTO: 14.1 FL (ref 9.2–12.9)
POTASSIUM SERPL-SCNC: 4.4 MMOL/L (ref 3.5–5.1)
PROT SERPL-MCNC: 7.1 G/DL (ref 6–8.4)
RBC # BLD AUTO: 4.44 M/UL (ref 4.6–6.2)
SODIUM SERPL-SCNC: 140 MMOL/L (ref 136–145)
WBC # BLD AUTO: 7.28 K/UL (ref 3.9–12.7)

## 2024-06-03 PROCEDURE — 80053 COMPREHEN METABOLIC PANEL: CPT | Performed by: INTERNAL MEDICINE

## 2024-06-03 PROCEDURE — 80197 ASSAY OF TACROLIMUS: CPT | Performed by: INTERNAL MEDICINE

## 2024-06-03 PROCEDURE — 36415 COLL VENOUS BLD VENIPUNCTURE: CPT | Mod: PO | Performed by: INTERNAL MEDICINE

## 2024-06-03 PROCEDURE — 82105 ALPHA-FETOPROTEIN SERUM: CPT | Performed by: INTERNAL MEDICINE

## 2024-06-03 PROCEDURE — 85025 COMPLETE CBC W/AUTO DIFF WBC: CPT | Performed by: INTERNAL MEDICINE

## 2024-06-04 LAB — TACROLIMUS BLD-MCNC: 2.8 NG/ML (ref 5–15)

## 2024-06-07 ENCOUNTER — TELEPHONE (OUTPATIENT)
Dept: TRANSPLANT | Facility: CLINIC | Age: 69
End: 2024-06-07
Payer: MEDICARE

## 2024-06-07 NOTE — TELEPHONE ENCOUNTER
Letter sent, labs stable and no medication changes are needed. Repeat labs due 9/2/24 per protocol.  ----- Message from James Coleman MD sent at 6/6/2024  4:41 PM CDT -----  Results reviewed

## 2024-06-07 NOTE — LETTER
June 7, 2024    Vick Gonzalez  [address] [id number]          Dear Vick Gonzalez:  MRN: [id number]    This is a follow up to your recent labs, your lab results were stable.  There are no medicine changes.  Please have your labs drawn again on 9/2/24.      If you cannot have your labs drawn on the scheduled date, it is your responsibility to call the transplant department to reschedule.  Please call [phone] and ask to speak to Herlinda Cruz, Medical Assistant for all scheduling requests.     Sincerely,    Kandy    Your Liver Transplant Coordinator    [facility]  [address] [place] [id number]  [phone]

## 2024-06-12 RX ORDER — LEVOTHYROXINE SODIUM 88 UG/1
TABLET ORAL
Qty: 90 TABLET | Refills: 0 | Status: SHIPPED | OUTPATIENT
Start: 2024-06-12

## 2024-06-13 RX ORDER — LISINOPRIL 20 MG/1
20 TABLET ORAL
Qty: 90 TABLET | Refills: 3 | OUTPATIENT
Start: 2024-06-13

## 2024-06-13 NOTE — TELEPHONE ENCOUNTER
Provider Staff:  Action required for this patient    Requires labs      Please see care gap opportunities below in Care Due Message.    Thanks!  [facility]     Appointments      Date Provider   Last Visit   12/11/2023 Emanuel Lopez MD   Next Visit   Visit date not found Emanuel Lopez MD     Refill Decision Note   Vick Gonzalez  is requesting a refill authorization.  Brief Assessment and Rationale for Refill:  Approve     Medication Therapy Plan:         Comments:     Note composed:10:20 PM 06/12/2024

## 2024-06-13 NOTE — TELEPHONE ENCOUNTER
Care Due:                  Date            Visit Type   Department     Provider  --------------------------------------------------------------------------------                                [facility]  Last Visit: 12-      FOLLOW UP    MEDICINE       Emanuel Lopez  Next Visit: None Scheduled  None         None Found                                                            Last  Test          Frequency    Reason                     Performed    Due Date  --------------------------------------------------------------------------------    HBA1C.......  6 months...  insulin, tirzepatide.....  12- 06-    Uric Acid...  12 months..  allopurinoL..............  Not Found    Overdue    [employer] Embedded Care Due Messages. Reference number: [id number].   6/12/2024 9:49:46 PM CDT

## 2024-06-14 RX ORDER — NIFEDIPINE 30 MG/1
30 TABLET, EXTENDED RELEASE ORAL
Qty: 90 TABLET | Refills: 3 | Status: SHIPPED | OUTPATIENT
Start: 2024-06-14

## 2024-07-01 ENCOUNTER — HOSPITAL ENCOUNTER (EMERGENCY)
Facility: HOSPITAL | Age: 69
Discharge: HOME | End: 2024-07-01
Attending: EMERGENCY MEDICINE
Payer: MEDICARE

## 2024-07-01 VITALS
HEART RATE: 77 BPM | WEIGHT: 260 LBS | SYSTOLIC BLOOD PRESSURE: 158 MMHG | TEMPERATURE: 98 F | BODY MASS INDEX: 33.37 KG/M2 | DIASTOLIC BLOOD PRESSURE: 86 MMHG | OXYGEN SATURATION: 99 % | HEIGHT: 74 IN | RESPIRATION RATE: 16 BRPM

## 2024-07-01 DIAGNOSIS — M54.12 CERVICAL RADICULOPATHY: Primary | ICD-10-CM

## 2024-07-01 DIAGNOSIS — M79.603 ARM PAIN: ICD-10-CM

## 2024-07-01 LAB
ALBUMIN SERPL BCP-MCNC: 3.9 G/DL (ref 3.5–5.2)
ALP SERPL-CCNC: 69 U/L (ref 55–135)
ALT SERPL W/O P-5'-P-CCNC: 23 U/L (ref 10–44)
ANION GAP SERPL CALC-SCNC: 10 MMOL/L (ref 8–16)
AST SERPL-CCNC: 19 U/L (ref 10–40)
BASOPHILS # BLD AUTO: 0.02 K/UL (ref 0–0.2)
BASOPHILS NFR BLD: 0.2 % (ref 0–1.9)
BILIRUB SERPL-MCNC: 0.7 MG/DL (ref 0.1–1)
BNP SERPL-MCNC: 25 PG/ML (ref 0–99)
BUN SERPL-MCNC: 18 MG/DL (ref 8–23)
CALCIUM SERPL-MCNC: 9.6 MG/DL (ref 8.7–10.5)
CHLORIDE SERPL-SCNC: 109 MMOL/L (ref 95–110)
CO2 SERPL-SCNC: 22 MMOL/L (ref 23–29)
CREAT SERPL-MCNC: 1.3 MG/DL (ref 0.5–1.4)
DIFFERENTIAL METHOD BLD: ABNORMAL
EOSINOPHIL # BLD AUTO: 0.3 K/UL (ref 0–0.5)
EOSINOPHIL NFR BLD: 3.5 % (ref 0–8)
ERYTHROCYTE [DISTWIDTH] IN BLOOD BY AUTOMATED COUNT: 14 % (ref 11.5–14.5)
EST. GFR  (NO RACE VARIABLE): >60 ML/MIN/1.73 M^2
GLUCOSE SERPL-MCNC: 118 MG/DL (ref 70–110)
HCT VFR BLD AUTO: 38.8 % (ref 40–54)
HGB BLD-MCNC: 12.9 G/DL (ref 14–18)
HIV 1+2 AB+HIV1 P24 AG SERPL QL IA: NORMAL
IMM GRANULOCYTES # BLD AUTO: 0.03 K/UL (ref 0–0.04)
IMM GRANULOCYTES NFR BLD AUTO: 0.3 % (ref 0–0.5)
INR PPP: 1 (ref 0.8–1.2)
LYMPHOCYTES # BLD AUTO: 2.3 K/UL (ref 1–4.8)
LYMPHOCYTES NFR BLD: 25.2 % (ref 18–48)
MAGNESIUM SERPL-MCNC: 1.8 MG/DL (ref 1.6–2.6)
MCH RBC QN AUTO: 29.3 PG (ref 27–31)
MCHC RBC AUTO-ENTMCNC: 33.2 G/DL (ref 32–36)
MCV RBC AUTO: 88 FL (ref 82–98)
MONOCYTES # BLD AUTO: 0.7 K/UL (ref 0.3–1)
MONOCYTES NFR BLD: 7.9 % (ref 4–15)
NEUTROPHILS # BLD AUTO: 5.7 K/UL (ref 1.8–7.7)
NEUTROPHILS NFR BLD: 62.9 % (ref 38–73)
NRBC BLD-RTO: 0 /100 WBC
OHS QRS DURATION: 84 MS
OHS QTC CALCULATION: 433 MS
PLATELET # BLD AUTO: 156 K/UL (ref 150–450)
PMV BLD AUTO: 12.2 FL (ref 9.2–12.9)
POTASSIUM SERPL-SCNC: 4.3 MMOL/L (ref 3.5–5.1)
PROT SERPL-MCNC: 7.8 G/DL (ref 6–8.4)
PROTHROMBIN TIME: 10.9 SEC (ref 9–12.5)
RBC # BLD AUTO: 4.4 M/UL (ref 4.6–6.2)
SODIUM SERPL-SCNC: 141 MMOL/L (ref 136–145)
TROPONIN I SERPL DL<=0.01 NG/ML-MCNC: <0.006 NG/ML (ref 0–0.03)
WBC # BLD AUTO: 9.11 K/UL (ref 3.9–12.7)

## 2024-07-01 PROCEDURE — 80197 ASSAY OF TACROLIMUS: CPT | Performed by: EMERGENCY MEDICINE

## 2024-07-01 PROCEDURE — 63600175 PHARM REV CODE 636 W HCPCS: Performed by: PHYSICIAN ASSISTANT

## 2024-07-01 PROCEDURE — 84484 ASSAY OF TROPONIN QUANT: CPT | Performed by: EMERGENCY MEDICINE

## 2024-07-01 PROCEDURE — 93005 ELECTROCARDIOGRAM TRACING: CPT

## 2024-07-01 PROCEDURE — 25000003 PHARM REV CODE 250: Performed by: PHYSICIAN ASSISTANT

## 2024-07-01 PROCEDURE — 87389 HIV-1 AG W/HIV-1&-2 AB AG IA: CPT | Performed by: PHYSICIAN ASSISTANT

## 2024-07-01 PROCEDURE — 85025 COMPLETE CBC W/AUTO DIFF WBC: CPT | Performed by: EMERGENCY MEDICINE

## 2024-07-01 PROCEDURE — 96374 THER/PROPH/DIAG INJ IV PUSH: CPT

## 2024-07-01 PROCEDURE — 83735 ASSAY OF MAGNESIUM: CPT | Performed by: EMERGENCY MEDICINE

## 2024-07-01 PROCEDURE — 93010 ELECTROCARDIOGRAM REPORT: CPT | Mod: ,,, | Performed by: INTERNAL MEDICINE

## 2024-07-01 PROCEDURE — 83880 ASSAY OF NATRIURETIC PEPTIDE: CPT | Performed by: EMERGENCY MEDICINE

## 2024-07-01 PROCEDURE — 99285 EMERGENCY DEPT VISIT HI MDM: CPT | Mod: 25

## 2024-07-01 PROCEDURE — 85610 PROTHROMBIN TIME: CPT | Performed by: EMERGENCY MEDICINE

## 2024-07-01 PROCEDURE — 80053 COMPREHEN METABOLIC PANEL: CPT | Performed by: EMERGENCY MEDICINE

## 2024-07-01 RX ORDER — METHOCARBAMOL 500 MG/1
1000 TABLET, FILM COATED ORAL
Status: COMPLETED | OUTPATIENT
Start: 2024-07-01 | End: 2024-07-01

## 2024-07-01 RX ORDER — MORPHINE SULFATE 4 MG/ML
4 INJECTION, SOLUTION INTRAMUSCULAR; INTRAVENOUS
Status: COMPLETED | OUTPATIENT
Start: 2024-07-01 | End: 2024-07-01

## 2024-07-01 RX ORDER — METHOCARBAMOL 500 MG/1
500 TABLET, FILM COATED ORAL 3 TIMES DAILY
Qty: 15 TABLET | Refills: 0 | Status: SHIPPED | OUTPATIENT
Start: 2024-07-01 | End: 2024-07-06

## 2024-07-01 RX ORDER — ACETAMINOPHEN 500 MG
1000 TABLET ORAL
Status: DISCONTINUED | OUTPATIENT
Start: 2024-07-01 | End: 2024-07-01

## 2024-07-01 RX ORDER — LIDOCAINE 50 MG/G
1 PATCH TOPICAL DAILY
Qty: 30 PATCH | Refills: 0 | Status: SHIPPED | OUTPATIENT
Start: 2024-07-01 | End: 2024-07-31

## 2024-07-01 RX ADMIN — METHOCARBAMOL 1000 MG: 500 TABLET ORAL at 01:07

## 2024-07-01 RX ADMIN — MORPHINE SULFATE 4 MG: 4 INJECTION INTRAVENOUS at 02:07

## 2024-07-01 NOTE — DISCHARGE INSTRUCTIONS
You were seen in the ED for sore pain and numbness to the left arm.  This is due to a pinched nerve in your neck.  I recommend continuing with psychiatric workup to do a stimulator trial.  I have prescribed you a muscle relaxer as well as a topical pain patch to help with your symptoms.  You may also follow-up with your primary care doctor or pain management.  If you develop any new weakness please return to ED sooner.

## 2024-07-01 NOTE — ED TRIAGE NOTES
Pt reports L arm pain radiating to hands w/ numbness. States there is a dark lakhwinder on the back of the arm and that is where the pain is originating. Also reports L side neck pain. States his problem has been going on for 4/5 months. Takes gabapentin for pain w/ no relief.    LOC: The patient is awake, alert and aware of environment with an appropriate affect, the patient is oriented x 3 and speaking appropriately.  APPEARANCE: Patient resting comfortably and in no acute distress, patient is clean and well groomed, patient's clothing is properly fastened.  SKIN: The skin is warm and dry, color consistent with ethnicity, patient has normal skin turgor and moist mucus membranes, skin intact, no breakdown or bruising noted.  MUSCULOSKELETAL: Patient moving all extremities spontaneously, no obvious swelling or deformities noted.  RESPIRATORY: Airway is open and patent, respirations are spontaneous, patient has a normal effort and rate, no accessory muscle use noted,   CARDIAC: Patient has a normal rate and regular rhythm, no periphreal edema noted, capillary refill < 3 seconds.  ABDOMEN: Soft and non tender to palpation, no distention noted, normoactive bowel sounds present in all four quadrants.  NEUROLOGIC:  facial expression is symmetrical, patient moving all extremities spontaneously, normal sensation in all extremities when touched with a finger.  Follows all commands appropriately.

## 2024-07-01 NOTE — ED PROVIDER NOTES
Encounter Date: 7/1/2024       History     Chief Complaint   Patient presents with    Arm Problem     L arm pain for 3 months with numbness     68-year-old male with past medical history of DM T2, chronic pain syndrome, CKD 3, CHF, hypertension, hypothyroidism, ETOH abuse, emphysema who presents to the ED for left arm pain and numbness and tingling.  Has chronic numbness and tingling in the left arm.  States he had cervical surgery on this in the past which did not have any improvement.  Has been taking gabapentin 100 mg t.i.d. for this.  Denies any recent trauma.  States he is still having pain that shoots down his left arm with numbness in the hands.  States he has done physical therapy in his also seen pain management for this.  Was told by his neurosurgeon that you may need a stimulator but needed to be cleared by Psychiatry prior to surgery which he was not done yet.    The history is provided by the patient.     Review of patient's allergies indicates:  No Known Allergies  Past Medical History:   Diagnosis Date    Acute congestive heart failure 5/12/2023    Anemia     Anxiety     Cataract     Chronic pain syndrome 07/13/2011    CKD (chronic kidney disease), stage III     Coronary artery disease involving native coronary artery of native heart with angina pectoris 8/24/2023    Diabetes mellitus type II, uncontrolled     Discitis of lumbosacral region 01/16/2015    ED (erectile dysfunction)     Encounter for blood transfusion     Genital herpes     Gout, arthritis     History of alcohol abuse     History of hepatitis C, s/p successful Rx w/ SVR24 (cure) - 5/2018 08/23/2011    Completed 24wks Epclusa + RBV w/SVR12 - 2/2018  -     History of positive PPD, treatment status unknown     Pulmonary granulomas, negative sputum cultures for AFB and indeterminate quantferon test    History of substance abuse     Hypertension     Hypothyroidism     Liver replaced by transplant 08/23/2011    DATE: 12/16/2013  LIVER BIOPSY :  REASON:  hep C staging  PATHOLOGY COMMITTEE NOTE/PLAN: :grade  1 / stage 1        Pancreatitis 2016    Peptic ulcer disease     Pulmonary emphysema, unspecified emphysema type 5/26/2023     Past Surgical History:   Procedure Laterality Date    ANTERIOR CERVICAL DISCECTOMY W/ FUSION N/A 8/22/2022    Procedure: Procedure: ACDF 4-7 LOS: 4.0 Anesthesia: General Blood: Type & Screen Radiology: C-Arm Microscope: ------- SNS: EMG, SEP, MEP Brace: Lorida Bed: Regular Bed, Shoulder Strap Headrest:------ Position: Supine Equipment: Depuy;  Surgeon: Titi Christian MD;  Location: [facility] OR;  Service: Neurosurgery;  Laterality: N/A;  Depuy  confirmed CW 8/19  Neuro monitoring confirmed CW 8/19    CARPAL TUNNEL RELEASE Left 10/29/2021    Procedure: RELEASE, CARPAL TUNNEL;  Surgeon: Jameson Alejo Jr., MD;  Location: [facility] OR;  Service: Orthopedics;  Laterality: Left;    CHOLECYSTECTOMY      CORONARY ANGIOGRAPHY N/A 8/2/2023    Procedure: ANGIOGRAM, CORONARY ARTERY;  Surgeon: Juan Vera MD;  Location: [facility] CATH LAB/EP;  Service: Cardiology;  Laterality: N/A;    DECOMPRESSION OF CERVICAL SPINE BY ANTERIOR APPROACH WITH FUSION  8/22/2022    Procedure: DECOMPRESSION AND FUSION, SPINE, CERVICAL, ANTERIOR APPROACH;  Surgeon: Titi Christian MD;  Location: [facility] OR;  Service: Neurosurgery;;    INJECTION OF JOINT Right 12/2/2019    Procedure: INJECTION, JOINT SI;  Surgeon: Thu Penn MD;  Location: [facility] PAIN MGT;  Service: Pain Management;  Laterality: Right;  RT SI JNT INJ    LEFT HEART CATHETERIZATION Left 8/2/2023    Procedure: Left heart cath;  Surgeon: Juan Vera MD;  Location: [facility] CATH LAB/EP;  Service: Cardiology;  Laterality: Left;    LIVER TRANSPLANT  06/2010    SPINE SURGERY      TRANSFORAMINAL EPIDURAL INJECTION OF STEROID Left 5/29/2023    Procedure: INJECTION, STEROID, EPIDURAL, TRANSFORAMINAL APPROACH,  LEFT C7-T1 DIRECT REF;  Surgeon: Thu Penn MD;  Location: [facility] PAIN MGT;  Service: Pain Management;   Laterality: Left;  4/3 MD ILL/PT WILL C/B     Family History   Problem Relation Name Age of Onset    Cancer Mother      Diabetes Mother      Heart disease Mother      Diabetes Sister      Cancer Maternal Uncle  82        colon CA    Drug abuse Daughter      Melanoma Neg Hx      Psoriasis Neg Hx      Lupus Neg Hx      Eczema Neg Hx      Acne Neg Hx       Social History     Tobacco Use    Smoking status: Former     Passive exposure: Never    Smokeless tobacco: Never   Substance Use Topics    Alcohol use: No     Comment: over 5 years ago, none currently    Drug use: Not Currently     Comment: Former cocaine use     Review of Systems   Constitutional:  Negative for chills and fever.   HENT:  Negative for sore throat.    Respiratory:  Negative for cough and shortness of breath.    Cardiovascular:  Negative for chest pain.   Gastrointestinal:  Negative for abdominal pain, nausea and vomiting.   Genitourinary:  Negative for difficulty urinating and dysuria.   Musculoskeletal:  Positive for neck pain.   Skin: Negative.    Neurological:  Negative for weakness.   Psychiatric/Behavioral:  Negative for confusion.        Physical Exam     Initial Vitals [07/01/24 1213]   BP Pulse Resp Temp SpO2   (!) 161/78 93 20 98.4 °F (36.9 °C) 96 %      MAP       --         Physical Exam    Nursing note and vitals reviewed.  Constitutional: He appears well-developed and well-nourished.   HENT:   Head: Normocephalic and atraumatic.   Eyes: Conjunctivae are normal.   Neck: Neck supple.   No midline cervical tenderness or step-offs   Normal range of motion.  Cardiovascular:  Normal rate.           Pulmonary/Chest: Breath sounds normal.   Abdominal: Abdomen is soft. There is no abdominal tenderness.   Musculoskeletal:         General: Normal range of motion.      Cervical back: Normal range of motion and neck supple.     Neurological: He is alert and oriented to person, place, and time. He has normal strength. No cranial nerve deficit. GCS score  is 15. GCS eye subscore is 4. GCS verbal subscore is 5. GCS motor subscore is 6.   Skin: Skin is warm and dry. Capillary refill takes less than 2 seconds.         ED Course   Procedures  Labs Reviewed   CBC W/ AUTO DIFFERENTIAL - Abnormal; Notable for the following components:       Result Value    RBC 4.40 (*)     Hemoglobin 12.9 (*)     Hematocrit 38.8 (*)     All other components within normal limits    Narrative:     Release to patient->Immediate   COMPREHENSIVE METABOLIC PANEL - Abnormal; Notable for the following components:    CO2 22 (*)     Glucose 118 (*)     All other components within normal limits    Narrative:     Release to patient->Immediate   TROPONIN I    Narrative:     Release to patient->Immediate   B-TYPE NATRIURETIC PEPTIDE    Narrative:     Release to patient->Immediate   MAGNESIUM    Narrative:     Release to patient->Immediate   PROTIME-INR    Narrative:     Release to patient->Immediate   HIV 1 / 2 ANTIBODY    Narrative:     Release to patient->Immediate   URINALYSIS, REFLEX TO URINE CULTURE   TACROLIMUS LEVEL          Imaging Results              X-Ray Chest AP Portable (Final result)  Result time 07/01/24 13:52:27      Final result by Mundo Jurado MD (07/01/24 13:52:27)                   Impression:      No acute cardiopulmonary disease.      Electronically signed by: Mundo Jurado MD  Date:    07/01/2024  Time:    13:52               Narrative:    EXAMINATION:  XR CHEST AP PORTABLE    CLINICAL HISTORY:  arm pain;    TECHNIQUE:  Single frontal view of the chest was performed.    COMPARISON:  08/02/2023    FINDINGS:  the heart size is normal.  Granulomatous type calcifications are present at the right hilum.  The lungs are expanded. Small calcified granuloma is again seen at right lung base. Interstitial markings in bilateral lower lung zones are upper normal. Lungs are otherwise clear without acute consolidation or pleural fluid. Skeletal structures show hardware at the lower  C-spine.                                       Medications   methocarbamoL tablet 1,000 mg (1,000 mg Oral Given 7/1/24 1320)     Medical Decision Making  68-year-old male presents to ED for your chronic cervical radiculopathy.  History of cervical radiculopathy did not improve with cervical decompression surgery.  Was seen by Neurosurgery in March no recommended psychological eval for stimulator trial.  Patient reports no improvement with physical therapy as well as steroid injections with pain management.  He has no focal weakness on my exam today and low suspicion for compressive myelopathy.  Will add Robaxin and topical Lidoderm in the meantime however feel he would benefit from continuing psych eval for stimulator and recommend follow-up with neurosurgery.  Cardiac workup was obtained by triage provider.  Reviewed the labs and EKG which were grossly unremarkable.  You do not feel this is a angina equivalent as his symptoms are consistent with cervical radiculopathy.  Return ED precautions given.    Amount and/or Complexity of Data Reviewed  Labs:  Decision-making details documented in ED Course.    Risk  OTC drugs.  Prescription drug management.               ED Course as of 07/01/24 1402   Mon Jul 01, 2024   1333 Troponin I: <0.006 [HJ]   1333 BNP: 25 [HJ]      ED Course User Index  [HJ] Harinder Reyes PA-C                           Clinical Impression:  Final diagnoses:  [M79.603] Arm pain  [M54.12] Cervical radiculopathy (Primary)          ED Disposition Condition    Discharge Stable          ED Prescriptions       Medication Sig Dispense Start Date End Date Auth. Provider    methocarbamoL (ROBAXIN) 500 MG Tab Take 1 tablet (500 mg total) by mouth 3 (three) times daily. for 5 days 15 tablet 7/1/2024 7/6/2024 Harinder Reyes PA-C    LIDOcaine (LIDODERM) 5 % Place 1 patch onto the skin once daily. Remove & Discard patch within 12 hours or as directed by MD 30 patch 7/1/2024 7/31/2024 Harinder Reyes PA-C           Follow-up Information       Follow up With Specialties Details Why Contact Info Additional Information    Steven Veliz - Neurosurgery [facility] Neurosurgery Schedule an appointment as soon as possible for a visit   [id number]  [facility] [address] [phone] 8th Floor [facility] [place] Please park in [facility]. Check in desk is located in the lobby. Please take the C elevator to 8th floor which opens to the lobby.             Harinder Reyes PA-C  07/01/24 [id number]

## 2024-07-01 NOTE — FIRST PROVIDER EVALUATION
"Medical screening examination initiated.  I have conducted a focused provider triage encounter, findings are as follows:    Brief history of present illness:   69 y/o male with hx  of liver transplant with left arm pain and numbness for several months. Pain 9/10.     Vitals:    07/01/24 1213   BP: (!) 161/78   Pulse: 93   Resp: 20   Temp: 98.4 °F (36.9 °C)   TempSrc: Oral   SpO2: 96%   Weight: 117.9 kg (260 lb)   Height: 6' 2" (1.88 m)       Pertinent physical exam:  non-toxic, no swelling    Brief workup plan:  labs, CXR. ECG    Preliminary workup initiated; this workup will be continued and followed by the physician or advanced practice provider that is assigned to the patient when roomed.      Celestino Kendrick DO, KARLA  Emergency Staff Physician   Dept of Emergency Medicine   [facility]  Spectralink: [id number]        Disclaimer: This note has been generated using voice-recognition software. There may be typographical errors that have been missed during proof-reading.    "

## 2024-07-02 ENCOUNTER — PATIENT OUTREACH (OUTPATIENT)
Dept: EMERGENCY MEDICINE | Facility: HOSPITAL | Age: 69
End: 2024-07-02
Payer: MEDICARE

## 2024-07-02 LAB — TACROLIMUS BLD-MCNC: 6.4 NG/ML (ref 5–15)

## 2024-07-02 NOTE — PROGRESS NOTES
Sheila Hameed MA  ED Navigator  Emergency Department    Project: [facility] ED Navigator  Role: Community Health Worker    Date: 07/02/2024  Patient Name: Vick Gonzalez  MRN: [id number]  PCP: Emanuel Lopez MD    Assessment:     Vick Gonzalez is a 68 y.o. male who has presented to ED for Cervical radiculopathy . Patient has visited the ED 1 times in the past 3 months. Patient did not contact PCP.     ED Navigator Initial Assessment    ED Navigator Enrollment Documentation  Consent to Services  Does patient consent to completing the assessment?: Yes  Contact  Method of Initial Contact: Phone  Transportation  Does the patient have issues with Transportation?: No  Does the patient have transportation to and from healthcare appointments?: Yes  Insurance Coverage  Do you have coverage/adequate coverage?: Yes  Type/kind of coverage: [employer]  Is patient able to afford co-pays/deductibles?: Yes  Is patient able to afford HME or supplies?: Yes  Does patient have an established [facility] PCP?: Yes  Able to access?: Yes  Specialist Appointment  Did the patient come to the ED to see a specialist?: No  Does the patient have a pending specialist referral?: No  Does the patient have a specialist appointment made?: Yes  Is the patient able to access a timely specialist appointment?: Yes  PCP Follow Up Appointment  Medications  Is patient able to afford medication?: Yes  Is patient unable to get medication due to lack of transportation?: No  Psychological  Does the patient have psycho-social concerns?: No  Food  Does the patient have concerns about food?: No  Communication/Education  Does the patient have limited English proficiency/English not primary language?: No  Does patient have low literacy and/or low health literacy?: Yes  Does patient have concerns with care?: No  Does patient have dissatisfaction with care?: No  Other Financial Concerns  Does the patient have immediate financial distress?: No  Does the  patient have general financial concerns?: No  Other Social Barriers/Concerns  Does the patient have any additional barriers or concerns?: None  Primary Barrier  Barriers identified: Cognitive barrier (health literacy, language and communication, etc.)  Root Cause of ED Utilization: Chronic Conditions  Plan to address Chronic Conditions: Encourage patient to contact PCP/specialist for follow up per ED discharge instructions  Next steps: Scheduled Appointment/Referral  Scheduled Appointment Date: 7/12/24  Appointment Reminder Date: 7/11/24  Was education/educational materials provided surrounding PCP services/creating a medical home?: Yes Was education verbal or written?: Written     Was education/educational materials provided surrounding low cost, healthy foods?: Yes Was education verbal or written?: Written     Was education/educational materials provided surrounding other items? If so, use comment to explain.: Yes Was education verbal or written?: Written   Plan: Provided information for [facility] 24/7 Nurse triage line, [phone] or 1-866-[facility] ([phone])  Expected Date of Follow Up 1: 8/2/24  Additional Documentation: Successful Enrollment. Patient states he is doing ok. He did need assistance with getting scheduled for a follow up appointment. Patient scheduled for neurosurgery 7/12/2024. Advised if he needs anything to give me a call.    RADHA English  Ed Navigator           Social History     Socioeconomic History    Marital status:    Tobacco Use    Smoking status: Former     Passive exposure: Never    Smokeless tobacco: Never   Substance and Sexual Activity    Alcohol use: No     Comment: over 5 years ago, none currently    Drug use: Not Currently     Comment: Former cocaine use    Sexual activity: Yes     Social Determinants of Health     Financial Resource Strain: Low Risk  (7/2/2024)    Overall Financial Resource Strain (CARDIA)     Difficulty of Paying Living Expenses: Not hard at all    Food Insecurity: No Food Insecurity (7/2/2024)    Hunger Vital Sign     Worried About Running Out of Food in the Last Year: Never true     Ran Out of Food in the Last Year: Never true   Transportation Needs: No Transportation Needs (7/2/2024)    PRAPARE - Transportation     Lack of Transportation (Medical): No     Lack of Transportation (Non-Medical): No   Physical Activity: Patient Unable To Answer (7/2/2024)    Exercise Vital Sign     Days of Exercise per Week: Patient unable to answer     Minutes of Exercise per Session: Patient unable to answer   Stress: Patient Unable To Answer (7/2/2024)    [employer] - Occupational Stress Questionnaire     Feeling of Stress : Patient unable to answer   Housing Stability: Low Risk  (7/2/2024)    Housing Stability Vital Sign     Unable to Pay for Housing in the Last Year: No     Homeless in the Last Year: No       Plan:   Successful Enrollment. Patient states he is doing ok. He did need assistance with getting scheduled for a follow up appointment. Patient scheduled for neurosurgery 7/12/2024. Advised if he needs anything to give me a call.    RADHA English  Ed Navigator

## 2024-07-03 ENCOUNTER — TELEPHONE (OUTPATIENT)
Dept: NEUROSURGERY | Facility: CLINIC | Age: 69
End: 2024-07-03
Payer: MEDICARE

## 2024-07-03 NOTE — TELEPHONE ENCOUNTER
Returned pt's call, pt stated that he is scheduled to have his psych eval and he wanted us to be aware. I stated to pt that the provider will notify us when it's done. Pt voiced understanding

## 2024-07-10 ENCOUNTER — CLINICAL SUPPORT (OUTPATIENT)
Dept: PSYCHIATRY | Facility: CLINIC | Age: 69
End: 2024-07-10
Payer: MEDICARE

## 2024-07-10 DIAGNOSIS — Z00.8 ENCOUNTER FOR PRE-SURGICAL PSYCHOLOGICAL ASSESSMENT: Primary | ICD-10-CM

## 2024-07-11 ENCOUNTER — PATIENT OUTREACH (OUTPATIENT)
Dept: EMERGENCY MEDICINE | Facility: HOSPITAL | Age: 69
End: 2024-07-11
Payer: MEDICARE

## 2024-07-11 DIAGNOSIS — Z94.4 S/P LIVER TRANSPLANT: ICD-10-CM

## 2024-07-11 RX ORDER — NIFEDIPINE 30 MG/1
30 TABLET, FILM COATED, EXTENDED RELEASE ORAL
Qty: 30 TABLET | Refills: 1 | Status: SHIPPED | OUTPATIENT
Start: 2024-07-11

## 2024-07-11 NOTE — PROGRESS NOTES
Successfully left a message for patient reminding him of his appointment with neurosx tomorrow at 4:30 pm.    RADHA English  Ed Navigator

## 2024-07-12 ENCOUNTER — OFFICE VISIT (OUTPATIENT)
Dept: NEUROSURGERY | Facility: CLINIC | Age: 69
End: 2024-07-12
Payer: MEDICARE

## 2024-07-12 VITALS
DIASTOLIC BLOOD PRESSURE: 85 MMHG | HEIGHT: 74 IN | BODY MASS INDEX: 33.36 KG/M2 | HEART RATE: 85 BPM | WEIGHT: 259.94 LBS | SYSTOLIC BLOOD PRESSURE: 140 MMHG

## 2024-07-12 DIAGNOSIS — G56.42 COMPLEX REGIONAL PAIN SYNDROME TYPE 2 OF LEFT UPPER EXTREMITY: Primary | ICD-10-CM

## 2024-07-12 PROCEDURE — 99999 PR PBB SHADOW E&M-EST. PATIENT-LVL V: CPT | Mod: PBBFAC,,, | Performed by: PHYSICIAN ASSISTANT

## 2024-07-12 NOTE — PROGRESS NOTES
"Subjective:     Patient ID:  Vick Gonzalez is a 68 y.o. male.    [place]    Chief Complaint: Left arm pain    HPI    Vick Gonzalez is a 68 y.o. male who presents for follow up.  Patient went to the emergency recently because of the severe left arm pain.  He continues to have pain in the left arm to the whole left hand.  He drops things with his left hand.  His left hand is numb.  No right arm pain or paresthesias.  The neck pain is constant.  He sees psychiatry next week for the psych eval for the spinal cord stimulator.      Review of Systems:  Constitution: Negative for chills, fever, night sweats and weight loss.   Musculoskeletal: Negative for falls.   Gastrointestinal: Negative for bowel incontinence, nausea and vomiting.   Genitourinary: Negative for bladder incontinence.   Neurological: Negative for disturbances in coordination and loss of balance.      Objective:      Vitals:    07/12/24 1615   BP: (!) 140/85   Pulse: 85   Weight: 117.9 kg (259 lb 14.8 oz)   Height: 6' 2" (1.88 m)   PainSc:   6   PainLoc: Arm         Physical Exam:      General:  Vick Gonzalez is well-developed, well-nourished, appears stated age, in no acute distress, alert and oriented to person, place, and time.    Pulmonary/Chest:  Respiratory effort normal  Abdominal: Exhibits no distension  Psychiatric:  Normal mood and affect.  Behavior is normal.  Judgement and thought content normal      Musculoskeletal:      Cervical ROM:   Pain in cervical spine flexion, extension, left rotation, and right rotation, left lateral bending, and right lateral bending.    Neurological:    Muscle strength against resistance:     Right Left   Deltoid  5 / 5 5 / 5   Biceps  5 / 5 5 / 5   Triceps 4 / 5 4 / 5   Wrist flexion  5 / 5 5 / 5   Wrist extension 5 / 5 5 / 5   Finger abduction 5 / 5 5 / 5   Thumb opposition 5 / 5 5 / 5   Handgrip 4 / 5 4 / 5       Reflexes:      Castro: Negative on the left.  Positive on the right    On gross examination of " the bilateral lower extremities, patient has full painfree ROM with no signs of clubbing, cyanosis, edema, and weakness.          Assessment:          1. Complex regional pain syndrome type 2 of left upper extremity            Plan:               Patient getting psych eval next week  Will fu up on that and see if he is a candidate for the SCS paddle lead trial    Follow-Up:  Follow up if symptoms worsen or fail to improve. If there are any questions prior to this, the patient was instructed to contact the office.       HERMELINDO Iraheta, PA-C  Neurosurgery  [facility]  07/12/2024

## 2024-07-18 ENCOUNTER — OFFICE VISIT (OUTPATIENT)
Dept: PSYCHIATRY | Facility: CLINIC | Age: 69
End: 2024-07-18
Payer: MEDICARE

## 2024-07-18 ENCOUNTER — DOCUMENTATION ONLY (OUTPATIENT)
Dept: PSYCHIATRY | Facility: CLINIC | Age: 69
End: 2024-07-18
Payer: MEDICARE

## 2024-07-18 DIAGNOSIS — G56.42 COMPLEX REGIONAL PAIN SYNDROME TYPE 2 OF LEFT UPPER EXTREMITY: ICD-10-CM

## 2024-07-18 DIAGNOSIS — Z01.818 PREOPERATIVE EVALUATION TO RULE OUT SURGICAL CONTRAINDICATION: Primary | ICD-10-CM

## 2024-07-18 PROCEDURE — 4010F ACE/ARB THERAPY RXD/TAKEN: CPT | Mod: CPTII,S$GLB,, | Performed by: PSYCHOLOGIST

## 2024-07-18 PROCEDURE — 90791 PSYCH DIAGNOSTIC EVALUATION: CPT | Mod: S$GLB,,, | Performed by: PSYCHOLOGIST

## 2024-07-18 PROCEDURE — 96139 PSYCL/NRPSYC TST TECH EA: CPT | Mod: S$GLB,,, | Performed by: PSYCHOLOGIST

## 2024-07-18 PROCEDURE — 3072F LOW RISK FOR RETINOPATHY: CPT | Mod: CPTII,S$GLB,, | Performed by: PSYCHOLOGIST

## 2024-07-18 PROCEDURE — 96130 PSYCL TST EVAL PHYS/QHP 1ST: CPT | Mod: S$GLB,,, | Performed by: PSYCHOLOGIST

## 2024-07-18 PROCEDURE — 96138 PSYCL/NRPSYC TECH 1ST: CPT | Mod: S$GLB,,, | Performed by: PSYCHOLOGIST

## 2024-07-18 NOTE — PROGRESS NOTES
PRESURGICAL PSYCHOLOGICAL EVALUATION - PAIN MANAGEMENT?   Psychiatry Initial Visit (PhD)?   Psychological Intake and Assessment?   ?   Site: [facility], Department of Psychiatry, Psychology Section?   [address] [place] [address]?   [place] [address]?   ?   CPT Codes:??   62865 (1 hour): Psychiatric Diagnostic Evaluation?   65564 (1 hour), 14288 (1 hour): Integration of patient data, interpretation of standardized test results and clinical data, clinical decision-making, treatment planning and report, and interactive feedback to the patient?   40860 (30 minutes), 10793 (30 minutes x 3): Psychological test administration and scoring by technician, two or more tests, any method: [place] Multiphasic Personality Inventory - 3 (MMPI-3); Pain and Impairment Relationship Scale (PAIRS); Jimenez Pain Catastrophizing Scale (PCS)    ?   ?   NAME:? Vick Gonzalez  ?   MRN: [id number]  ?   : 1955  ?   ?   Date:? 2024    Evaluation Length (direct face-to-face time): 40  Total Time including report writing, test scoring, chart review, integration of data and feedback: 60  ?   Clinical status of patient: Outpatient?   Met with: Patient?   Referred by: Khadar Payne MD    ?   Chief complaint/reason for encounter: Psychological Evaluation prior to surgical implantation of a spinal cord stimulator (SCS) to address chronic pain.??   ?   Before this evaluation was initiated, the purposes and process of the assessment and the limits of confidentiality were discussed with the patient who expressed understanding of these issues and verbally consented to proceed with the evaluation.?     ?   HISTORY OF PRESENT ILLNESS:?   Mr. Vick Gonzalez is a 68 y.o.-year-old male referred for Psychological Evaluation prior to surgical implantation of a spinal cord stimulator (SCS) to address chronic pain. He has chronic pain in his left hand and back. His pain has been present for 3-4 years. He has tried physical therapy,  "medications, injections, and surgery without receiving sufficient relief. His activities are greatly limited. He reports, I can't pick anything up, I can't tie my shoes. It's hard for me to wipe myself when I go to the bathroom.?   ?   Mr. Gonzalez is now interested in a trial of SCS. He has reviewed the educational materials and is familiar with the procedures involved. He understood risks of surgery including bleeding, infection, failure of surgery, misplaced hardware, migration of hardware, need for reoperation, etc. When asked about potential concerns regarding SCS, he indicated he doesn't have any. His expectations regarding SCS include "any reduction in this pain." He is motivated to "not have this pain." If SCS is not effective for him he noted he would not consider suicide as an option and would look forward to future treatment options. His fiance will be available to help his during recovery after surgery.?   ?   When asked how pain affects his mood, Mr. Gonzalez reported, it makes me angry and frustrated. Regarding mental health history, he denied past psychiatric treatment and history. He does not report current psychiatric problems or major psychosocial stressors. ?   ?     MEDICAL HISTORY:?   Relevant Medical History:   Patient Active Problem List   Diagnosis    Chronic pain syndrome    Acquired hypothyroidism    History of hepatitis C, s/p successful Rx w/ SVR24 (cure) - 5/2018    Gout, unspecified    Substance abuse    Thrombocytopenia    Anxiety    Lumbar radiculopathy    Acquired spondylolisthesis    Essential hypertension    Type 2 diabetes mellitus with diabetic polyneuropathy, with long-term current use of insulin    S/P liver transplant    Hypertriglyceridemia    CKD (chronic kidney disease), stage III    PAD (peripheral artery disease)    Erectile dysfunction due to arterial insufficiency    BPH with urinary obstruction    Immunosuppressed status    Low testosterone in male    Hypertension " associated with diabetes    Hyperlipidemia associated with type 2 diabetes mellitus    Erectile dysfunction associated with type 2 diabetes mellitus    Claudication in peripheral vascular disease    Aortic atherosclerosis    Calcified granuloma of lung    Carpal tunnel syndrome of left wrist    Acute congestive heart failure    Alcohol dependence, in remission    Pulmonary emphysema, unspecified emphysema type    Coronary artery disease involving native coronary artery of native heart with angina pectoris    Epistaxis    Complex regional pain syndrome type 2 of left upper extremity        Current Health Behaviors:?   Compliant with medical regimens and appointments: Yes  Prescription medication misuse: No?   Exercise: No?   Adequate sleep: Yes   Adequate cognitive functioning: Yes  ?   Current and Past Substance Use/Abuse:?   Alcohol: Denied current use; history of abuse, has had liver transplant due to cirrhosis. No alcohol since 2009   Drugs: Denied current use; denied history of abuse or dependency.?   Tobacco: None.??   Caffeine: rarely.?   ?   Trauma History: denies  ?   Psychosocial History and Current Social Situation:???   Mr. Gonzalez was born and raised in [place], [place]. He described his childhood as average. He denied childhood trauma, abuse, and neglect. He completed the 10th grade. He is currently retired. He has a fiancee, he has been with her for 31 years. He has 2 children (26 and 42). He currently lives with his fiancee.   Legal history: He denied history of arrests and convictions. He denied current involvement in litigation.?   Access to guns: denies  ?   ?   PAST AND CURRENT MENTAL HEALTH:?   Past Mental Health History:?   Previous diagnosis: alcohol dependence, in remission  Inpatient treatment: Denied.?   Prior substance abuse treatment: Denied.?   Outpatient treatment: Denied.?   Suicide attempt: Denied.?   Non-suicidal self-injury: Denied.?   ?   Family Mental Health History:?   Psychiatric  illness: Denied  Substance abuse: Denied  Suicide: Denied  ?   Current Mental Health Treatment:?   Medications: Trazodone.?   Psychotherapy: Denied.?   ?   Current Mental Health Symptoms:?   Depression: Denied. He denied episodes of depressed mood or depression-related anhedonia, lack of motivation, lethargy, difficulty concentrating, feelings of worthlessness or guilt, hopelessness, appetite changes, or psychomotor changes.??   Luz Elena/Hypomania: Denied. He denied periods of elevated mood or abnormally increased energy or goal-directed activity.?   Anxiety:??   Generalized Anxiety: Denied. He denied experiencing excessive, exaggerated anxiety that was unmanageable.??   Panic Disorder: Denied.?   OCD: Denied.?   Insomnia: Denied.?   Thought dysfunction: Denied delusions, hallucinations.?   Non-suicidal or Suicidal thoughts/behaviors: Denied.?   Personality functioning: The patient does not display any personality characteristics which would be an impediment to pursuing SCS.?   ?   Current Stress Management:?   Current psychosocial stressors: denies  Report of coping skills: walking, riding bikes, talk with friends  Support system: [employer]  ?   ?   PSYCHOLOGICAL ASSESSMENT/TESTING:?   All tests were administered according to standardized procedures and were selected based on the reason for referral.  ?   MMPI-3. The MMPI-3 provides an assessment of personality and psychopathology with specific evaluation of psychosocial risk factors associated with outcomes of SCS.?   Mr. Gonzalez produced an invalid and uninterpretable MMPI-3 profile due to fixed-true responding. This likely represents a non-cooperative test-taking approach. As a result, no interpretations can be made.    PAIRS and PCS. The PAIRS and PCS reveal beliefs and attitudes related to pain that may impact outcomes of SCS. Elevated scores indicate the need to query the ability to cope with the pain experience, high levels of emotional distress when pain occurs,  and a negative mental set and persistent attention brought to bear during the experience of pain.??   The PAIRS indicated significant complications related to perceptions of impairment with a total score of 78 (a score over 75 is clinically significant). These results suggest a tendency toward negative beliefs and attitudes about the ability to function and enjoy life despite discomfort from pain, which is more likely to contribute to greater functional impairments.?   The PCS indicated significant catastrophizing of pain with a total score of 40, which is in the 92nd percentile (a score over 30 is in the 75th percentile and is clinically significant). His subscale scores indicated significant Rumination (100th percentile), significant Magnification (100th percentile), and significant Helplessness (70th percentile). These results suggest a tendency toward elevated pain perception and a tendency to focus on one's pain, increased perception of the seriousness of pain, and pessimism toward one's ability to cope with the pain experience. Catastrophic thinking is a risk factor for chronicity.??   ?   FEEDBACK. Mr. Gonzalez was provided with test results, and offered the opportunity to respond to feedback and clarify results if needed.? He stated he is unsure why his MMPI was invalid because he thought he was understanding the questions appropriately and answering accurately.   ?   MENTAL STATUS EXAM:?   General appearance: appears stated age, neatly dressed, well groomed?   Speech: normal rate and tone?   Level of cooperation: cooperative?   Thought processes: logical, goal-directed?   Mood: euthymic?   Thought content: no illusions, no visual hallucinations, no auditory hallucinations, no delusions, no active or passive homicidal thoughts, no active or passive suicidal ideation, no obsessions, no compulsions, no violence?   Affect: appropriate?   Orientation: oriented to person, place, and date?   Memory:?   Recent memory:  intact  Remote memory: - intact?   Attention span and concentration: good  Fund of general knowledge: good  Judgment and insight: fair?   Language: intact?   ? ?   SUMMARY AND RECOMMENDATIONS:?   Mr. Vick Gonzalez is a 68-year-old male referred for presurgical psychological evaluation prior to surgical implantation of a spinal cord stimulator (SCS) to address chronic pain.? Test results revealed pain catastrophizing. The MMPI was invalid and could not be interpreted. In the clinical interview, he endorsed a history of alcohol abuse in remission, he has been sober since 2009.   ?   There are no indications of disabling psychopathology, substance use/abuse, cognitive problems, or disabilities that would prevent understanding and competence with medical treatment. There are no reports of major psychosocial stressors that would interfere with his engagement in treatment. There is no evidence of suicidality.?   He exhibits medium social stability and good social support. He has adequate coping strategies to deal with stress and the demands of surgery and recovery.?   He has fair knowledge about SCS, appropriate expectations for surgery and recovery, adequate understanding of possible risks of this treatment option, and a medium willingness to sustain effort for lifestyle changes and health adaptations required. He reports adequate compliance with prior medical regimens.?   Results from psychological assessment/testing revealed mild risks. Based on research and recommendations regarding presurgical psychological screening for pain control procedures, his test results and reports are within the expected range to predict adequate outcomes and patient satisfaction.?   ?   ??Mr. Gonzalez is a suitable candidate from a psychological perspective.??There are no absolute psychological contraindications to SCS. There are no recommendations for psychological intervention at this time.?   ?   Diagnostic impressions:?     ICD-10-CM  ICD-9-CM   1. Preoperative evaluation to rule out surgical contraindication  Z01.818 V72.83   2. Complex regional pain syndrome type 2 of left upper extremity  G56.42 354.4       ?   Plan: This report will be sent to the referring provider with impressions and recommendations. It will be the referring team's decision whether the patient proceeds with surgery. Services related to the presurgical psychological evaluation are now concluded.?   ?   ?   ?       Rosie Vicente, PhD  Licensed Clinical Psychologist ([id number])  [facility]

## 2024-07-18 NOTE — Clinical Note
There are no overt psychological contraindications for proceeding with SCS surgery. No recommendations made.

## 2024-07-18 NOTE — PROGRESS NOTES
??Mr. Gonzalez is a suitable candidate from a psychological perspective.??There are no absolute psychological contraindications to SCS. There are no recommendations for psychological intervention at this time.?

## 2024-07-19 ENCOUNTER — TELEPHONE (OUTPATIENT)
Dept: NEUROSURGERY | Facility: CLINIC | Age: 69
End: 2024-07-19
Payer: MEDICARE

## 2024-07-19 NOTE — TELEPHONE ENCOUNTER
Patient had the psych eval yesterday and they said ok to proceed with SCS.    Thanks,  Paige Gilbert, [facility], PA-C  Neurosurgery  [facility]  07/19/2024

## 2024-07-26 ENCOUNTER — TELEPHONE (OUTPATIENT)
Dept: NEUROSURGERY | Facility: CLINIC | Age: 69
End: 2024-07-26
Payer: MEDICARE

## 2024-07-26 DIAGNOSIS — Z79.4 TYPE 2 DIABETES MELLITUS WITH DIABETIC POLYNEUROPATHY, WITH LONG-TERM CURRENT USE OF INSULIN: Chronic | ICD-10-CM

## 2024-07-26 DIAGNOSIS — E11.42 TYPE 2 DIABETES MELLITUS WITH DIABETIC POLYNEUROPATHY, WITH LONG-TERM CURRENT USE OF INSULIN: Chronic | ICD-10-CM

## 2024-07-26 DIAGNOSIS — I73.9 PAD (PERIPHERAL ARTERY DISEASE): Chronic | ICD-10-CM

## 2024-07-26 DIAGNOSIS — D69.6 THROMBOCYTOPENIA: ICD-10-CM

## 2024-07-26 DIAGNOSIS — N18.30 STAGE 3 CHRONIC KIDNEY DISEASE, UNSPECIFIED WHETHER STAGE 3A OR 3B CKD: Chronic | ICD-10-CM

## 2024-07-26 DIAGNOSIS — E03.9 ACQUIRED HYPOTHYROIDISM: Chronic | ICD-10-CM

## 2024-07-26 DIAGNOSIS — Z01.818 PRE-OP TESTING: Primary | ICD-10-CM

## 2024-07-26 NOTE — TELEPHONE ENCOUNTER
Returned pt's call, to schedule surgery. I scheduled pt to have spinal surgery on August 29. Pt denied taking any blood thinner, but does take medication for his diabetes. I stated to pt that I will contact his PCP to have him scheduled for a pre-op clearance appointment. Pt voiced understanding

## 2024-07-26 NOTE — TELEPHONE ENCOUNTER
Studies ordered below as required.  Please schedule preop    Orders Placed This Encounter   Procedures    X-Ray Chest PA And Lateral    CBC Auto Differential    Comprehensive Metabolic Panel    Urinalysis    APTT    Protime-INR    Hemoglobin A1C    TSH    SCHEDULED EKG 12-LEAD (to Muse)

## 2024-07-26 NOTE — TELEPHONE ENCOUNTER
Patient was scheduled for x-ray, EKG, and labs. Attempted to call patient to schedule pre-op appointment with Dr Lopez. Left voicemail requesting return call.

## 2024-07-26 NOTE — TELEPHONE ENCOUNTER
Good Morning,  Patient is scheduled to have spinal surgery with  on August 29. He will need to be cleared for surgery and scheduled for a pre-op visit. He needs the following test completed     CBC  CMP  PT/PTT  Urinalysis    EKG  Chest xray    Thanks,  MT

## 2024-07-30 ENCOUNTER — CLINICAL SUPPORT (OUTPATIENT)
Dept: LAB | Facility: HOSPITAL | Age: 69
End: 2024-07-30
Attending: FAMILY MEDICINE
Payer: MEDICARE

## 2024-07-30 ENCOUNTER — HOSPITAL ENCOUNTER (OUTPATIENT)
Dept: RADIOLOGY | Facility: HOSPITAL | Age: 69
Discharge: HOME | End: 2024-07-30
Attending: FAMILY MEDICINE
Payer: MEDICARE

## 2024-07-30 DIAGNOSIS — Z79.4 TYPE 2 DIABETES MELLITUS WITH DIABETIC POLYNEUROPATHY, WITH LONG-TERM CURRENT USE OF INSULIN: Chronic | ICD-10-CM

## 2024-07-30 DIAGNOSIS — N18.30 STAGE 3 CHRONIC KIDNEY DISEASE, UNSPECIFIED WHETHER STAGE 3A OR 3B CKD: Chronic | ICD-10-CM

## 2024-07-30 DIAGNOSIS — E11.42 TYPE 2 DIABETES MELLITUS WITH DIABETIC POLYNEUROPATHY, WITH LONG-TERM CURRENT USE OF INSULIN: Chronic | ICD-10-CM

## 2024-07-30 DIAGNOSIS — I73.9 PAD (PERIPHERAL ARTERY DISEASE): Chronic | ICD-10-CM

## 2024-07-30 DIAGNOSIS — E03.9 ACQUIRED HYPOTHYROIDISM: Chronic | ICD-10-CM

## 2024-07-30 DIAGNOSIS — Z01.818 PRE-OP TESTING: ICD-10-CM

## 2024-07-30 DIAGNOSIS — D69.6 THROMBOCYTOPENIA: ICD-10-CM

## 2024-07-30 LAB
OHS QRS DURATION: 88 MS
OHS QTC CALCULATION: 464 MS

## 2024-07-30 PROCEDURE — 93010 ELECTROCARDIOGRAM REPORT: CPT | Mod: ,,, | Performed by: INTERNAL MEDICINE

## 2024-07-30 PROCEDURE — 71046 X-RAY EXAM CHEST 2 VIEWS: CPT | Mod: 26,,, | Performed by: STUDENT IN AN ORGANIZED HEALTH CARE EDUCATION/TRAINING PROGRAM

## 2024-07-30 PROCEDURE — 93005 ELECTROCARDIOGRAM TRACING: CPT

## 2024-07-30 PROCEDURE — 71046 X-RAY EXAM CHEST 2 VIEWS: CPT | Mod: TC,FY

## 2024-08-07 ENCOUNTER — TELEPHONE (OUTPATIENT)
Dept: NEUROSURGERY | Facility: CLINIC | Age: 69
End: 2024-08-07
Payer: MEDICARE

## 2024-08-07 DIAGNOSIS — G56.42 COMPLEX REGIONAL PAIN SYNDROME TYPE 2 OF LEFT UPPER EXTREMITY: Primary | ICD-10-CM

## 2024-08-08 DIAGNOSIS — E11.9 TYPE 2 DIABETES MELLITUS WITHOUT COMPLICATION: ICD-10-CM

## 2024-08-12 ENCOUNTER — ANESTHESIA EVENT (OUTPATIENT)
Dept: SURGERY | Facility: HOSPITAL | Age: 69
End: 2024-08-12
Payer: MEDICARE

## 2024-08-14 ENCOUNTER — TELEPHONE (OUTPATIENT)
Dept: ANESTHESIOLOGY | Facility: HOSPITAL | Age: 69
End: 2024-08-14
Payer: MEDICARE

## 2024-08-14 ENCOUNTER — TELEPHONE (OUTPATIENT)
Dept: PREADMISSION TESTING | Facility: HOSPITAL | Age: 69
End: 2024-08-14
Payer: MEDICARE

## 2024-08-14 ENCOUNTER — HOSPITAL ENCOUNTER (OUTPATIENT)
Dept: PREADMISSION TESTING | Facility: HOSPITAL | Age: 69
Discharge: HOME | End: 2024-08-14
Attending: NURSE PRACTITIONER
Payer: MEDICARE

## 2024-08-14 NOTE — DISCHARGE INSTRUCTIONS
Your surgery is scheduled for 8/29/24.    Please report to [facility] on the 1st Floor at 100 p.m.    THIS TIME IS SUBJECT TO CHANGE.  YOU WILL RECEIVE A PHONE CALL THE DAY BEFORE SURGERY BY 3:30 PM TO CONFIRM YOUR TIME OF ARRIVAL.  IF YOU HAVE NOT RECEIVED A PHONE CALL BY 3:30 PM THE DAY BEFORE YOUR SURGERY PLEASE CALL [phone]     INSTRUCTIONS IMPORTANT!!!  ¨ Do not eat or drink 8 hours prior to arrival time-including water, candy, gum, & mints. OK to brush teeth.    Please take Levothyroxine, Prograft, Entresto, Metoprolol, Nifedipine, and Toujeo 20 units the morning of surgery. Last dose of Mounjaro on 8/19/24.       ____  Do not wear makeup, including mascara.  ____  No powder, lotions or creams to surgical area.  ____  Please remove all jewelry, including piercings and leave at home.  ____  No money or valuables needed. Please leave at home.  ____  Please bring any documents given by your doctor.  ____  If going home the same day, arrange for a ride home. You will not be able to             drive if Anesthesia was used.  ____  Children under 18 years require a parent / guardian present the entire time             they are in surgery / recovery.  ____  Wear loose fitting clothing. Allow for dressings, bandages.  ____  Stop Aspirin and blood thinners as directed by prescribing provider.  ____  Do not take any herbal, vitamins, and or supplements 2 weeks prior to surgery.  ____  Stop NSAIDS (Naproxen, Aleve, Voltaren, Celebrex, Melxoicam), Goody's, and BC powder 7 days prior to surgery.  ____  Wash the surgical area with Hibiclens or Dial Antibacterial bar soap the night before surgery, and again the             morning of surgery.  Be sure to rinse hibiclens or Dial Antibacterial bar soap off completely (if instructed by   nurse).  ____  If you take diabetic medication including Metformin, Glimepiride, Glipizide, Glyburide, Byetta, Januvia, Actos, do not take am of surgery unless             instructed by Doctor.  ____  Hold Invokana, Farxiga, and Jardiance for 3 days prior to surgery.   ____  Call MD for temperature above 101 degrees or any other signs of infection such as Urinary (bladder) infection, Upper respiratory infection, skin boils,             etc.  ____ Do Not wear your contact lenses the day of your procedure.  You may wear your glasses.      ____ Do not shave surgical site for 3 days prior to surgery.  ____ Practice Good hand washing before, during, and after procedure.      I have read or had read and explained to me, and understand the above information.  Additional comments or instructions:  For additional questions call [phone]      ANESTHESIA SIDE EFFECTS  -For the first 24 hours after surgery:  Do not drive, use heavy equipment, make important decisions, or drink alcohol  -It is normal to feel sleepy for several hours.  Rest until you are more awake.  -Have someone stay with you, if needed.  They can watch for problems and help keep you safe.  -Some possible post anesthesia side effects include: nausea and vomiting, sore throat and hoarseness, sleepiness, and dizziness.        Pre-Op Bathing Instructions    Before surgery, you can play an important role in your own health.    Because skin is not sterile, we need to be sure that your skin is as free of germs as possible. By following the instructions below, you can reduce the number of germs on your skin before surgery.    IMPORTANT: You will need to shower with a special soap called Hibiclens*, available at any pharmacy.  If you are allergic to Chlorhexidine (the antiseptic in Hibiclens), use an antibacterial soap such as Dial Soap for your preoperative shower.  You will shower with Hibiclens both the night before your surgery and the morning of your surgery.  Do not use Hibiclens on the head, face or genitals to avoid injury to those areas.    STEP #1: THE NIGHT BEFORE YOUR SURGERY     Do not shave the area of your body where your  surgery will be performed.  Shower and wash your hair and body as usual with your normal soap and shampoo.  Rinse your hair and body thoroughly after you shower to remove all soap residue.  With your hand, apply one packet of Hibiclens soap to the surgical site.   Wash the site gently for five (5) minutes. Do not scrub your skin too hard.   Do not wash with your regular soap after Hibiclens is used.  Rinse your body thoroughly.  Pat yourself dry with a clean, soft towel.  Do not use lotion, cream, or powder.  Wear clean clothes.    STEP #2: THE MORNING OF YOUR SURGERY     Repeat Step #1.    * Not to be used by people allergic to Chlorhexidine.

## 2024-08-14 NOTE — TELEPHONE ENCOUNTER
Good morning, he is having a laminectomy for neurotransmitter placement with Dr. Payne on 8/29/24. Is there any specific instructions he needs prior to surgery?

## 2024-08-14 NOTE — TELEPHONE ENCOUNTER
Good morning! Patient requests a call to discuss staying overnight following laminectomy scheduled with Dr. Payne on 8/29.

## 2024-08-14 NOTE — PRE-PROCEDURE INSTRUCTIONS
Arranging a ride home.      Allergies, medical, surgical, family and psychosocial histories reviewed with patient. Periop plan of care reviewed. Preop instructions given, including medications to take and to hold. Hibiclens soap and instructions on use given. Time allotted for questions to be addressed.      Arrival time 1300.    Instructed to take Levothyroxine, Prograft, Entresto, Metoprolol, Nifedipine, and Toujeo 20 units the morning of surgery. Last dose of Mounjaro on 8/19/24.

## 2024-08-14 NOTE — ANESTHESIA PREPROCEDURE EVALUATION
"                                                                                                             8/29/2024  Vick Gonzalez is a 68 y.o., male with PMHx of OPMhx of DM T2, chronic pain syndrome, CKD 3, CHF, hypertension, hypothyroidism, ETOH abuse s/p liver tx (2013), NSTEMI hospitalized 08/02/2023, discharged 08/03/2023.    Echo 8/2/23:    Left Ventricle: The left ventricle is normal in size. Normal wall thickness. Normal wall motion. There is normal systolic function with a visually estimated ejection fraction of 55 - 60%. Grade I diastolic dysfunction.    Left Atrium: Left atrium is mildly dilated.    Right Ventricle: Normal right ventricular cavity size. Systolic function is normal.    Right Atrium: Right atrium is mildly dilated.    Aortic Valve: The aortic valve is a trileaflet valve. There is moderate aortic valve sclerosis.    Mitral Valve: There is no stenosis. The mean pressure gradient across the mitral valve is 1 mmHg at a heart rate of  bpm. There is trace regurgitation.    Tricuspid Valve: There is trace regurgitation.    Pulmonic Valve: There is no significant stenosis. The estimated PA systolic pressure is 20 mmHg.    IVC/SVC: Normal venous pressure at 3 mmHg.      scheduled for C1-2 laminectomy for paddle lead placement for spinal cord stimulator trial 8/29/24.    Per family medicine Dr. Lopez, " Medically stable for upcoming neurosurgery procedure at moderate risk given Risk factors of CAD, diabetes, heart failure.  He is currently well compensated.  EKG and labs are stable.  No acute cardiopulmonary symptoms that would necessitate urgent delay of is upcoming procedure. "    Past Medical History:   Diagnosis Date    Acute congestive heart failure 5/12/2023    Anemia     Anxiety     Cataract     Chronic pain syndrome 07/13/2011    CKD (chronic kidney disease), stage III     Coronary artery disease involving native coronary artery of native heart with angina pectoris 8/24/2023    " Diabetes mellitus type II, uncontrolled     Discitis of lumbosacral region 01/16/2015    ED (erectile dysfunction)     Encounter for blood transfusion     Genital herpes     Gout, arthritis     History of alcohol abuse     History of hepatitis C, s/p successful Rx w/ SVR24 (cure) - 5/2018 08/23/2011    Completed 24wks Epclusa + RBV w/SVR12 - 2/2018  -     History of positive PPD, treatment status unknown     Pulmonary granulomas, negative sputum cultures for AFB and indeterminate quantferon test    History of substance abuse     Hypertension     Hypothyroidism     Liver replaced by transplant 08/23/2011    DATE: 12/16/2013  LIVER BIOPSY : REASON:  hep C staging  PATHOLOGY COMMITTEE NOTE/PLAN: :grade  1 / stage 1        Pancreatitis 2016    Peptic ulcer disease     Pulmonary emphysema, unspecified emphysema type 5/26/2023     Past Surgical History:   Procedure Laterality Date    ANTERIOR CERVICAL DISCECTOMY W/ FUSION N/A 8/22/2022    Procedure: Procedure: ACDF 4-7 LOS: 4.0 Anesthesia: General Blood: Type & Screen Radiology: C-Arm Microscope: ------- SNS: EMG, SEP, MEP Brace: Dexter Bed: Regular Bed, Shoulder Strap Headrest:------ Position: Supine Equipment: Depuy;  Surgeon: Titi Christian MD;  Location: [facility] OR;  Service: Neurosurgery;  Laterality: N/A;  Depuy  confirmed CW 8/19  Neuro monitoring confirmed CW 8/19    CARPAL TUNNEL RELEASE Left 10/29/2021    Procedure: RELEASE, CARPAL TUNNEL;  Surgeon: Jameson Alejo Jr., MD;  Location: [facility] OR;  Service: Orthopedics;  Laterality: Left;    CHOLECYSTECTOMY      CORONARY ANGIOGRAPHY N/A 8/2/2023    Procedure: ANGIOGRAM, CORONARY ARTERY;  Surgeon: Juan Vera MD;  Location: [facility] CATH LAB/EP;  Service: Cardiology;  Laterality: N/A;    DECOMPRESSION OF CERVICAL SPINE BY ANTERIOR APPROACH WITH FUSION  8/22/2022    Procedure: DECOMPRESSION AND FUSION, SPINE, CERVICAL, ANTERIOR APPROACH;  Surgeon: Titi Christian MD;  Location: [facility] OR;  Service: Neurosurgery;;     INJECTION OF JOINT Right 12/2/2019    Procedure: INJECTION, JOINT SI;  Surgeon: Thu Penn MD;  Location: [facility] PAIN MGT;  Service: Pain Management;  Laterality: Right;  RT SI JNT INJ    LEFT HEART CATHETERIZATION Left 8/2/2023    Procedure: Left heart cath;  Surgeon: Juan Vera MD;  Location: [facility] CATH LAB/EP;  Service: Cardiology;  Laterality: Left;    LIVER TRANSPLANT  06/2010    SPINE SURGERY      TRANSFORAMINAL EPIDURAL INJECTION OF STEROID Left 5/29/2023    Procedure: INJECTION, STEROID, EPIDURAL, TRANSFORAMINAL APPROACH,  LEFT C7-T1 DIRECT REF;  Surgeon: Thu Penn MD;  Location: [facility] PAIN MGT;  Service: Pain Management;  Laterality: Left;  4/3 MD ILL/PT WILL C/B     Review of patient's allergies indicates:  No Known Allergies  Current Outpatient Medications   Medication Instructions    albuterol (PROAIR HFA) 90 mcg/actuation inhaler 2 puffs, Inhalation, Every 6 hours PRN, Rescue    allopurinoL (ZYLOPRIM) 100 MG tablet TAKE 1 TABLET BY MOUTH TWICE A DAY    aluminum-magnesium hydroxide-simethicone (MAALOX) 200-200-20 mg/5 mL Susp 30 mLs, Oral, Before meals & nightly    aspirin 81 mg, Oral, Daily    atorvastatin (LIPITOR) 40 mg, Oral, Daily    blood-glucose meter Misc 1 Device, Misc.(Non-Drug; Combo Route), 3 times daily    blood-glucose meter,continuous (DEXCOM G7 ) Misc Use as directed.    blood-glucose sensor (DEXCOM G7 SENSOR) Viviane Use as directed.    blood-glucose transmitter (DEXCOM G6 TRANSMITTER) Viviane Use as directed    calcium carbonate-vitamin D3 (CALCIUM 600 WITH VITAMIN D3) 600 mg(1,500mg) -400 unit Chew 1 tablet, Oral, Daily    clopidogreL (PLAVIX) 75 mg, Oral, Daily    cyanocobalamin (VITAMIN B-12) 100 mcg, Oral, Daily    diphenhydrAMINE (BENADRYL) 25 mg, Oral, Daily PRN    furosemide (LASIX) 40 mg, Oral, Daily PRN    gabapentin (NEURONTIN) 800 mg, Oral, 3 times daily    insulin lispro (HUMALOG KWIKPEN INSULIN) 20 Units, Subcutaneous, 3 times daily with meals    lancets 30 gauge  "Misc 1 lancet , Misc.(Non-Drug; Combo Route), Before meals & nightly    levothyroxine (SYNTHROID) 88 MCG tablet TAKE 1 TABLET BY MOUTH EVERY DAY    lisinopriL (PRINIVIL,ZESTRIL) 20 mg, Oral    metoprolol succinate (TOPROL-XL) 200 mg, Oral, Daily    MOUNJARO 2.5 mg, Subcutaneous, Every 7 days    multivit with min-folic acid (MEN'S MULTIVITAMIN GUMMIES) 200 mcg Chew 1 tablet, Oral, Daily    NIFEdipine (ADALAT CC) 30 mg, Oral    NIFEdipine (PROCARDIA-XL) 30 mg, Oral    NOVOFINE PLUS 32 gauge x 1/6" Ndle No dose, route, or frequency recorded.    papaverine 30 mg/mL injection Tri-Mix - PGE (alprostadil) 10mcg, papavarine 30mg, phentolamine 1mg; dispense 5mL vial, typical starting is 0.2 mL which is equal to 20 units    sacubitriL-valsartan (ENTRESTO) 49-51 mg per tablet 1 tablet, Oral, 2 times daily    sildenafiL (VIAGRA) 50 mg, Oral, Daily PRN    sildenafiL (VIAGRA) 100 mg, Oral, Daily PRN    tacrolimus (PROGRAF) 1 MG Cap Take 2 capsules (2 mg total) by mouth every morning AND 1 capsule (1 mg total) every evening.    TOUJEO MAX U-300 SOLOSTAR 40 Units, Subcutaneous, Daily    traMADoL (ULTRAM) 50 mg, Oral, Every 6 hours PRN    traZODone (DESYREL) 50 mg, Oral, Nightly    TRUE METRIX GLUCOSE TEST STRIP Strp USE 3 TIMES DAILY TO TEST BLOOD GLUCOSE LEVEL       Pre-op Assessment    I have reviewed the Patient Summary Reports.     I have reviewed the Nursing Notes. I have reviewed the NPO Status.   I have reviewed the Medications.     Review of Systems  Anesthesia Hx:  No problems with previous Anesthesia   History of prior surgery of interest to airway management or planning: cervical fusion.           Denies Personal Hx of Anesthesia complications.                    Social:  Former Smoker, No Alcohol Use       Cardiovascular:  Exercise tolerance: good   Denies Pacemaker. Hypertension   CAD       Denies Angina. CHF   PVD hyperlipidemia             Peripheral Arterial Disease                   Pulmonary:   COPD (patient " denies diagnosis), mild    Denies Shortness of breath.   Calcified granuloma of lung                  Renal/:  Chronic Renal Disease, CKD  BPH              Hepatic/GI:   PUD,    Hepatitis (treated in 2018), C S/p liver transplant in 2016          Musculoskeletal:  Arthritis          Spine Disorders: lumbar            Neurological:  Denies TIA.  Denies CVA. Neuromuscular Disease,   Denies Seizures.          Chronic Pain Syndrome                         Endocrine:  Diabetes (a1c 6.3 on 7/30/24), well controlled, type 2 Hypothyroidism          Psych:   anxiety                 Physical Exam  General: Cooperative, Oriented, Well nourished and Alert    Airway:  Mallampati: III / II  Mouth Opening: Normal  TM Distance: Normal  Tongue: Normal  Neck ROM: Normal ROM    Dental:  Dentures      Lab Results   Component Value Date    WBC 8.42 07/30/2024    HGB 12.5 (L) 07/30/2024    HCT 37.7 (L) 07/30/2024     07/30/2024    CHOL 143 08/03/2023    TRIG 234 (H) 08/03/2023    HDL 21 (L) 08/03/2023    ALT 23 07/30/2024    AST 19 07/30/2024     07/30/2024    K 3.8 07/30/2024     07/30/2024    CREATININE 1.3 07/30/2024    BUN 18 07/30/2024    CO2 21 (L) 07/30/2024    TSH 1.999 07/30/2024    PSA 0.22 01/19/2022    INR 1.1 07/30/2024    HGBA1C 6.3 (H) 07/30/2024     Results for orders placed or performed in visit on 07/30/24   SCHEDULED EKG 12-LEAD (to Muse)    Collection Time: 07/30/24  6:50 AM   Result Value Ref Range    QRS Duration 88 ms    [facility]HS QTC Calculation 464 ms    Narrative    Test Reason : Z01.818,D69.6,E03.9,E11.42,Z79.4,I73.9,N18.30,    Vent. Rate : 087 BPM     Atrial Rate : 087 BPM     P-R Int : 162 ms          QRS Dur : 088 ms      QT Int : 386 ms       P-R-T Axes : 064 044 035 degrees     QTc Int : 464 ms    Normal sinus rhythm  Normal ECG  When compared with ECG of 01-JUL-2024 12:16,  Nonspecific T wave abnormality no longer evident in Lateral leads  Confirmed by Pepito TOWNSEND, Cameron ([id number]) on  7/30/2024 8:44:58 AM    Referred By: SHAZIA HERRMANN           Confirmed By:Cameron Beyer MD     EXAMINATION:  XR CHEST PA AND LATERAL    CLINICAL HISTORY:  Encounter for other preprocedural examination    TECHNIQUE:  PA and lateral views of the chest were performed.    COMPARISON:  Chest radiograph 08/02/2023.    FINDINGS:  Mediastinal structures are midline. Normal mediastinal and hilar contours. Normal size cardiac silhouette. Lungs are symmetrically expanded well aerated. Stable calcified granuloma at right lung base. No focal consolidation or mass. No pneumothorax. No significant pleural fluid. Partially imaged postsurgical change in the cervical spine.    Impression:    No new intrathoracic abnormality.    Electronically signed by: Yair Sierra  Date: 07/30/2024     Anesthesia Plan  Type of Anesthesia, risks & benefits discussed:    Anesthesia Type: Gen ETT  Intra-op Monitoring Plan: Standard ASA Monitors and Art Line  Post Op Pain Control Plan: multimodal analgesia and IV/PO Opioids PRN  Induction:  IV  Airway Plan: Video, Post-Induction  Informed Consent: Informed consent signed with the Patient and all parties understand the risks and agree with anesthesia plan.  All questions answered.   ASA Score: 3  Day of Surgery Review of History & Physical: H&P Update referred to the surgeon/provider.  Anesthesia Plan Notes:   No chest pain, shortness of breath.      Ready For Surgery From Anesthesia Perspective.     .

## 2024-08-15 ENCOUNTER — PATIENT OUTREACH (OUTPATIENT)
Dept: EMERGENCY MEDICINE | Facility: HOSPITAL | Age: 69
End: 2024-08-15
Payer: MEDICARE

## 2024-08-15 ENCOUNTER — TELEPHONE (OUTPATIENT)
Dept: PREADMISSION TESTING | Facility: HOSPITAL | Age: 69
End: 2024-08-15
Payer: MEDICARE

## 2024-08-15 NOTE — TELEPHONE ENCOUNTER
Good morning, he is scheduled for surgery with Dr. Payne on 8/29/24. Have you received clearance?

## 2024-08-15 NOTE — PROGRESS NOTES
Successful first follow up message sent to patient. Advised if he needed assistance with anything to give me a call.    RADHA English  Ed Navigator

## 2024-08-16 ENCOUNTER — TELEPHONE (OUTPATIENT)
Dept: FAMILY MEDICINE | Facility: CLINIC | Age: 69
End: 2024-08-16
Payer: MEDICARE

## 2024-08-16 NOTE — TELEPHONE ENCOUNTER
----- Message from Jeremías Colin sent at 8/16/2024  8:54 AM CDT -----  Type:  Procedure    Who Called:spouse   Does the patient know what this is regarding?:clearance for upcoming procedure   Would the patient rather a call back or a response via [facility]? Call   Best Call Back Number:[phone] and  [phone]  Additional Information:

## 2024-08-23 ENCOUNTER — TELEPHONE (OUTPATIENT)
Dept: NEUROSURGERY | Facility: CLINIC | Age: 69
End: 2024-08-23
Payer: MEDICARE

## 2024-08-23 NOTE — TELEPHONE ENCOUNTER
Returned pt's call, pt stated that he would like to know if he can stay at least overnight after his surgery. I stated to pt that  stated that is fine. Pt voiced understanding

## 2024-08-26 ENCOUNTER — OFFICE VISIT (OUTPATIENT)
Dept: FAMILY MEDICINE | Facility: CLINIC | Age: 69
End: 2024-08-26
Payer: MEDICARE

## 2024-08-26 VITALS
HEIGHT: 74 IN | TEMPERATURE: 98 F | DIASTOLIC BLOOD PRESSURE: 64 MMHG | OXYGEN SATURATION: 97 % | BODY MASS INDEX: 33.07 KG/M2 | WEIGHT: 257.69 LBS | HEART RATE: 90 BPM | SYSTOLIC BLOOD PRESSURE: 118 MMHG

## 2024-08-26 DIAGNOSIS — F19.10 SUBSTANCE ABUSE: ICD-10-CM

## 2024-08-26 DIAGNOSIS — D84.9 IMMUNOSUPPRESSION: ICD-10-CM

## 2024-08-26 DIAGNOSIS — J84.10 CALCIFIED GRANULOMA OF LUNG: ICD-10-CM

## 2024-08-26 DIAGNOSIS — F10.21 ALCOHOL DEPENDENCE, IN REMISSION: ICD-10-CM

## 2024-08-26 DIAGNOSIS — Z01.818 PREOPERATIVE EXAMINATION: ICD-10-CM

## 2024-08-26 DIAGNOSIS — G95.9 CERVICAL MYELOPATHY: ICD-10-CM

## 2024-08-26 DIAGNOSIS — Z79.4 TYPE 2 DIABETES MELLITUS WITH DIABETIC POLYNEUROPATHY, WITH LONG-TERM CURRENT USE OF INSULIN: ICD-10-CM

## 2024-08-26 DIAGNOSIS — E11.42 TYPE 2 DIABETES MELLITUS WITH DIABETIC POLYNEUROPATHY, WITH LONG-TERM CURRENT USE OF INSULIN: ICD-10-CM

## 2024-08-26 DIAGNOSIS — K74.60 HEPATIC CIRRHOSIS, UNSPECIFIED HEPATIC CIRRHOSIS TYPE, UNSPECIFIED WHETHER ASCITES PRESENT: ICD-10-CM

## 2024-08-26 DIAGNOSIS — M50.30 DDD (DEGENERATIVE DISC DISEASE), CERVICAL: ICD-10-CM

## 2024-08-26 DIAGNOSIS — G63 POLYNEUROPATHY ASSOCIATED WITH UNDERLYING DISEASE: ICD-10-CM

## 2024-08-26 DIAGNOSIS — I25.119 CORONARY ARTERY DISEASE INVOLVING NATIVE CORONARY ARTERY OF NATIVE HEART WITH ANGINA PECTORIS: ICD-10-CM

## 2024-08-26 DIAGNOSIS — D69.6 THROMBOCYTOPENIA: ICD-10-CM

## 2024-08-26 DIAGNOSIS — R26.89 IMBALANCE: ICD-10-CM

## 2024-08-26 DIAGNOSIS — I50.32 CHRONIC HEART FAILURE WITH PRESERVED EJECTION FRACTION: ICD-10-CM

## 2024-08-26 DIAGNOSIS — I73.9 PAD (PERIPHERAL ARTERY DISEASE): ICD-10-CM

## 2024-08-26 DIAGNOSIS — G89.4 CHRONIC PAIN SYNDROME: Primary | ICD-10-CM

## 2024-08-26 DIAGNOSIS — Z94.4 S/P LIVER TRANSPLANT: ICD-10-CM

## 2024-08-26 DIAGNOSIS — J43.9 PULMONARY EMPHYSEMA, UNSPECIFIED EMPHYSEMA TYPE: ICD-10-CM

## 2024-08-26 PROCEDURE — 1101F PT FALLS ASSESS-DOCD LE1/YR: CPT | Mod: CPTII,S$GLB,, | Performed by: FAMILY MEDICINE

## 2024-08-26 PROCEDURE — 3078F DIAST BP <80 MM HG: CPT | Mod: CPTII,S$GLB,, | Performed by: FAMILY MEDICINE

## 2024-08-26 PROCEDURE — 4010F ACE/ARB THERAPY RXD/TAKEN: CPT | Mod: CPTII,S$GLB,, | Performed by: FAMILY MEDICINE

## 2024-08-26 PROCEDURE — 99999 PR PBB SHADOW E&M-EST. PATIENT-LVL V: CPT | Mod: PBBFAC,,, | Performed by: FAMILY MEDICINE

## 2024-08-26 PROCEDURE — 99215 OFFICE O/P EST HI 40 MIN: CPT | Mod: S$GLB,,, | Performed by: FAMILY MEDICINE

## 2024-08-26 PROCEDURE — 3288F FALL RISK ASSESSMENT DOCD: CPT | Mod: CPTII,S$GLB,, | Performed by: FAMILY MEDICINE

## 2024-08-26 PROCEDURE — 3074F SYST BP LT 130 MM HG: CPT | Mod: CPTII,S$GLB,, | Performed by: FAMILY MEDICINE

## 2024-08-26 PROCEDURE — 1159F MED LIST DOCD IN RCRD: CPT | Mod: CPTII,S$GLB,, | Performed by: FAMILY MEDICINE

## 2024-08-26 PROCEDURE — G2211 COMPLEX E/M VISIT ADD ON: HCPCS | Mod: S$GLB,,, | Performed by: FAMILY MEDICINE

## 2024-08-26 PROCEDURE — 3044F HG A1C LEVEL LT 7.0%: CPT | Mod: CPTII,S$GLB,, | Performed by: FAMILY MEDICINE

## 2024-08-26 PROCEDURE — 1125F AMNT PAIN NOTED PAIN PRSNT: CPT | Mod: CPTII,S$GLB,, | Performed by: FAMILY MEDICINE

## 2024-08-26 PROCEDURE — 3072F LOW RISK FOR RETINOPATHY: CPT | Mod: CPTII,S$GLB,, | Performed by: FAMILY MEDICINE

## 2024-08-26 PROCEDURE — 3008F BODY MASS INDEX DOCD: CPT | Mod: CPTII,S$GLB,, | Performed by: FAMILY MEDICINE

## 2024-08-26 RX ORDER — INSULIN LISPRO 100 [IU]/ML
20 INJECTION, SOLUTION INTRAVENOUS; SUBCUTANEOUS
Qty: 60 ML | Refills: 11 | Status: SHIPPED | OUTPATIENT
Start: 2024-08-26

## 2024-08-26 NOTE — LETTER
August 26, 2024        Khadar Payne MD  [address]  [address]  [facility] [address]             [facility]  [address]  [address]  [facility] [address]  Phone: [phone]  Fax: [phone]   Patient: Vick Gonzalez   MR Number: [id number]   YOB: 1955   Date of Visit: 8/26/2024       Dear Dr. Payne:    Thank you for referring Vick Gonzalez to me for evaluation.  He is medically stable for upcoming neurosurgery procedure.  You may reference my note in epic for further details.            If you have questions, please do not hesitate to call me. I look forward to following Vick along with you.    Sincerely,           Emanuel Lopez MD

## 2024-08-26 NOTE — TELEPHONE ENCOUNTER
Care Due:                  Date            Visit Type   Department     Provider  --------------------------------------------------------------------------------                                EP -                              [facility]  Last Visit: 08-      CARE ([facility])   [facility]                              [facility]  Next Visit: 02-      CARE ([facility])   [facility]                                                            Last  Test          Frequency    Reason                     Performed    Due Date  --------------------------------------------------------------------------------    Uric Acid...  12 months..  allopurinoL..............  Not Found    Overdue    [employer] Embedded Care Due Messages. Reference number: [id number].   8/26/2024 3:57:53 PM CDT

## 2024-08-26 NOTE — PROGRESS NOTES
(Portions of this note were dictated using voice recognition software and may contain dictation related errors in spelling/grammar/syntax not found on text review)    CC:   Chief Complaint   Patient presents with    Pre-op Exam    Referral     ENT for balance issues       HPI: 68 y.o. male          Neuro surgery following for cervical disc disease, Had cervical spine fusion surgery August 2022.  Followed up with neuro surgery and was having some worsening left upper extremity pain.  EMG showed chronic left C7 radiculopathy and left ulnar neuropathy across the elbow.  Last neuro surgery visit was 07/12/2024.  Initially in 2023  Was planning for cervical spine surgery given radiculopathic/myelopathic symptoms September 25th although in the meantime was hospitalized for NSTEMI above .  Surgery was canceled secondary to this..  As per last visit note from neuro surgery, planning on spinal cord stimulator trial (scheduled for 08/29/2024)        CHF prompting hospitalization in May 2023  Started on isosorbide hydralazine 20/37.5 t.i.d.  Prescribed furosemide 40 mg p.r.n. for weight gain greater than 2-3 lb in 1 day or 4-6 lb over 1 week, has not needed  Was on lisinopril but this was changed in favor of Entresto 49/51 b.i.d. after cardiology visit 07/24/2023  metoprolol 200 mg daily   Nifedipine 30 mg daily    Echo 08/03/2023: EF 55-60%, grade 1 diastolic dysfunction      NSTEMI hospitalized 08/02/2023, discharged 08/03/2023.  UDS was positive for cocaine and opiates.  Was given nitroglycerin and heparin infusions, seen in consultation with Cardiology, left heart catheterization showed nonobstructive coronary artery disease, no stents placed.  Advise medical management with aspirin/Plavix for 1 year and high-intensity statin therapy     Most recent EKG on file from 07/30/2024, personal review.  NSR, normal axis, no acute ischemic changes     He denies any chest pain or shortness and breath.  Does have some chronic issues  with feeling imbalanced when he walks.  We would like to see ENT.  Denies any ear pain or ringing.      Chronic pain, followed by pain management.  Was prior On oxycodone along with his gabapentin.  However failed drug test with positive cocaine and Soma.  History of cocaine abuse,     pain management had recommended continuing with gabapentin and interventional management if desired.         Diabetes with peripheral neuropathy:  Complicated by  dietary noncompliance now followed by endocrinology (last seen January 2022), was on Trulicity but not currently taking.  His current regimen of diabetes was adjusted to glargine (toujeo) 40 units, NovoLog to up to 20 units t.i.d. with meals.  Was believed that a lot of his hyperglycemia issues prior were relating to dietary and insulin noncompliance.   .  Was considering G LP 1 agonist therapy but there was some concern given prior history of pancreatitis, although pancreatitis prior was relating to alcohol use.  Given not drinking anymore and no recurrent issue with pancreatitis, we discussed starting on Mounjaro 2.5 mg weekly to make sure you can tolerate.  Current A1c is 6.3 below.     Dyslipidemia on Crestor 5mg daily.     Hypothyroidism:  Synthroid 88mcg.       Anxiety on Zoloft 100 mg daily.       Liver Transplant secondary to end-stage liver disease hepatitis C and prior alcohol abuse.:  Followed by hepatology.  On Prograf.   fibroscan showed stage II fibrosis         Past Medical History:   Diagnosis Date    Acute congestive heart failure 5/12/2023    Anemia     Anxiety     Cataract     Chronic pain syndrome 07/13/2011    CKD (chronic kidney disease), stage III     Coronary artery disease involving native coronary artery of native heart with angina pectoris 8/24/2023    Diabetes mellitus type II, uncontrolled     Discitis of lumbosacral region 01/16/2015    ED (erectile dysfunction)     Encounter for blood transfusion     Genital herpes     Gout, arthritis      History of alcohol abuse     History of hepatitis C, s/p successful Rx w/ SVR24 (cure) - 5/2018 08/23/2011    Completed 24wks Epclusa + RBV w/SVR12 - 2/2018  -     History of positive PPD, treatment status unknown     Pulmonary granulomas, negative sputum cultures for AFB and indeterminate quantferon test    History of substance abuse     Hypertension     Hypothyroidism     Liver replaced by transplant 08/23/2011    DATE: 12/16/2013  LIVER BIOPSY : REASON:  hep C staging  PATHOLOGY COMMITTEE NOTE/PLAN: :grade  1 / stage 1        Pancreatitis 2016    Peptic ulcer disease     Pulmonary emphysema, unspecified emphysema type 5/26/2023       Past Surgical History:   Procedure Laterality Date    ANTERIOR CERVICAL DISCECTOMY W/ FUSION N/A 8/22/2022    Procedure: Procedure: ACDF 4-7 LOS: 4.0 Anesthesia: General Blood: Type & Screen Radiology: C-Arm Microscope: ------- SNS: EMG, SEP, MEP Brace: [place] Bed: Regular Bed, Shoulder Strap Headrest:------ Position: Supine Equipment: Depuy;  Surgeon: Titi Christian MD;  Location: [facility] OR;  Service: Neurosurgery;  Laterality: N/A;  Depuy  confirmed CW 8/19  Neuro monitoring confirmed CW 8/19    CARPAL TUNNEL RELEASE Left 10/29/2021    Procedure: RELEASE, CARPAL TUNNEL;  Surgeon: Jameson Alejo Jr., MD;  Location: [facility] OR;  Service: Orthopedics;  Laterality: Left;    CHOLECYSTECTOMY      CORONARY ANGIOGRAPHY N/A 8/2/2023    Procedure: ANGIOGRAM, CORONARY ARTERY;  Surgeon: Juan Vera MD;  Location: [facility] CATH LAB/EP;  Service: Cardiology;  Laterality: N/A;    DECOMPRESSION OF CERVICAL SPINE BY ANTERIOR APPROACH WITH FUSION  8/22/2022    Procedure: DECOMPRESSION AND FUSION, SPINE, CERVICAL, ANTERIOR APPROACH;  Surgeon: Titi Christian MD;  Location: [facility] OR;  Service: Neurosurgery;;    INJECTION OF JOINT Right 12/2/2019    Procedure: INJECTION, JOINT SI;  Surgeon: Thu Penn MD;  Location: [facility];  Service: Pain Management;  Laterality: Right;  RT SI JNT INJ    LEFT  HEART CATHETERIZATION Left 8/2/2023    Procedure: Left heart cath;  Surgeon: Juan Vera MD;  Location: [facility] CATH LAB/EP;  Service: Cardiology;  Laterality: Left;    LIVER TRANSPLANT  06/2010    SPINE SURGERY      TRANSFORAMINAL EPIDURAL INJECTION OF STEROID Left 5/29/2023    Procedure: INJECTION, STEROID, EPIDURAL, TRANSFORAMINAL APPROACH,  LEFT C7-T1 DIRECT REF;  Surgeon: Thu Penn MD;  Location: [facility] PAIN MGT;  Service: Pain Management;  Laterality: Left;  4/3 MD ILL/PT WILL C/B       Family History   Problem Relation Name Age of Onset    Cancer Mother      Diabetes Mother      Heart disease Mother      Diabetes Sister      Cancer Maternal Uncle  82        colon CA    Drug abuse Daughter      Melanoma Neg Hx      Psoriasis Neg Hx      Lupus Neg Hx      Eczema Neg Hx      Acne Neg Hx         Social History     Tobacco Use    Smoking status: Former     Passive exposure: Never    Smokeless tobacco: Never   Substance Use Topics    Alcohol use: No     Comment: over 5 years ago, none currently    Drug use: Not Currently     Comment: Former cocaine use       Lab Results   Component Value Date    WBC 8.42 07/30/2024    HGB 12.5 (L) 07/30/2024    HCT 37.7 (L) 07/30/2024    MCV 87 07/30/2024     07/30/2024    CHOL 143 08/03/2023    TRIG 234 (H) 08/03/2023    HDL 21 (L) 08/03/2023    ALT 23 07/30/2024    AST 19 07/30/2024    BILITOT 0.6 07/30/2024    ALKPHOS 61 07/30/2024     07/30/2024    K 3.8 07/30/2024     07/30/2024    CREATININE 1.3 07/30/2024    ESTGFRAFRICA >60.0 07/25/2022    EGFRNONAA 52.7 (A) 07/25/2022    EGFRNORACEVR >60 07/30/2024    CALCIUM 9.0 07/30/2024    ALBUMIN 3.9 07/30/2024    BUN 18 07/30/2024    CO2 21 (L) 07/30/2024    TSH 1.999 07/30/2024    PSA 0.22 01/19/2022    PSADIAG 0.28 10/09/2017    INR 1.1 07/30/2024    HGBA1C 6.3 (H) 07/30/2024    MICALBCREAT 30.8 (H) 08/16/2022    LDLCALC 75.2 08/03/2023     (H) 07/30/2024    [id number] 30 10/11/2021         Hemoglobin  "(g/dL)   Date Value   07/30/2024 12.5 (L)   07/01/2024 12.9 (L)   06/03/2024 12.7 (L)   03/04/2024 12.3 (L)   12/04/2023 12.0 (L)   11/27/2023 12.7 (L)   08/21/2023 13.1 (L)   08/03/2023 11.8 (L)   08/02/2023 12.4 (L)   05/31/2023 11.9 (L)     Platelets (K/uL)   Date Value   07/30/2024 169   07/01/2024 156   06/03/2024 129 (L)   03/04/2024 156   12/04/2023 129 (L)   11/27/2023 143 (L)   08/21/2023 149 (L)   08/03/2023 133 (L)   08/02/2023 154   05/31/2023 168     Hemoglobin A1C (%)   Date Value   07/30/2024 6.3 (H)   12/04/2023 8.5 (H)   05/13/2023 6.8 (H)   12/05/2022 7.8 (H)   08/16/2022 9.7 (H)   01/25/2022 7.4 (H)   01/24/2022 7.4 (H)   10/11/2021 10.1 (H)   11/11/2020 9.1 (H)   09/01/2020 11.9 (H)     eGFR (mL/min/1.73 m^2)   Date Value   07/30/2024 >60   07/01/2024 >60.0   06/03/2024 >60.0   03/04/2024 >60.0   12/04/2023 >60.0   11/27/2023 >60.0   08/21/2023 >60.0   08/03/2023 >60   08/02/2023 41 (A)   07/24/2023 >60.0             Vital signs reviewed  Vitals:    08/26/24 1454   BP: 118/64   BP Location: Left arm   Patient Position: Sitting   BP Method: Medium (Manual)   Pulse: 90   Temp: 98.2 °F (36.8 °C)   TempSrc: Oral   SpO2: 97%   Weight: 116.9 kg (257 lb 11.5 oz)   Height: 6' 2" (1.88 m)         Wt Readings from Last 4 Encounters:   08/26/24 116.9 kg (257 lb 11.5 oz)   07/12/24 117.9 kg (259 lb 14.8 oz)   07/01/24 117.9 kg (260 lb)   05/24/24 119.7 kg (263 lb 14.3 oz)       PE:   APPEARANCE: Well nourished, well developed, in no acute distress.    HEAD: Normocephalic, atraumatic.  EYES: PERRL. EOMI.   Conjunctivae noninjected.  EARS: TM's intact. Light reflex normal. No retraction or perforation  NOSE: Mucosa pink. Airway clear.  MOUTH & THROAT: No tonsillar enlargement. No pharyngeal erythema or exudate.   NECK: Supple with no cervical lymphadenopathy.  No carotid bruits.  No thyromegaly  CHEST: Good inspiratory effort. Lungs clear to auscultation with no wheezes or crackles.  CARDIOVASCULAR: Normal S1, " S2. No rubs, murmurs, or gallops.  ABDOMEN: Bowel sounds normal. Not distended. Soft. No tenderness or masses. No organomegaly.  EXTREMITIES: No edema       IMPRESSION  1. Chronic pain syndrome    2. DDD (degenerative disc disease), cervical    3. Type 2 diabetes mellitus with diabetic polyneuropathy, with long-term current use of insulin    4. S/P liver transplant    5. PAD (peripheral artery disease)    6. Immunosuppression    7. Preoperative examination    8. Cervical myelopathy    9. Hepatic cirrhosis, unspecified hepatic cirrhosis type, unspecified whether ascites present    10. Polyneuropathy associated with underlying disease    11. Pulmonary emphysema, unspecified emphysema type    12. Substance abuse    13. Chronic heart failure with preserved ejection fraction    14. Thrombocytopenia    15. Alcohol dependence, in remission    16. Coronary artery disease involving native coronary artery of native heart with angina pectoris    17. Calcified granuloma of lung    18. Imbalance              PLAN  Orders Placed This Encounter   Procedures    Ambulatory referral/consult to ENT             Reviewed available labs, cardiac testing    Medically stable for upcoming neurosurgery procedure at moderate risk given Risk factors of CAD, diabetes, heart failure.  He is currently well compensated.  EKG and labs are stable.  No acute cardiopulmonary symptoms that would necessitate urgent delay of is upcoming procedure.    Continue current cardiac medications including Entresto, nifedipine, metoprolol, aspirin, statin    Diabetes: Stable.  Make sure not to take NovoLog when not eating prior to surgery    Has stable anemia.  Has prior thrombocytopenia but more recent platelet counts are within normal range.    No longer drinking alcohol     Continue his current transplant immunotherapy    Diabetes: Stable on current therapy of Toujeo 40 units daily, NovoLog 20 units with meals, Mounjaro 2.5 mg weekly.  Would advise no NovoLog  morning of surgery when he is not eating.      Blood pressure stable     ENT referral requested for disequilibrium/imbalance concerns    Will notify his neurosurgeon of medical stability

## 2024-08-28 NOTE — PLAN OF CARE
PT on RA, no respiratory distress noted. Will continue to monitor.     Progress note: This writer spoke to the patient's brother, Eric, via phone.    Eric said that he had just talked to the patient on the phone earlier today.  He felt that the patient sounded good during the phone conversation.    Eric was informed that likely, tomorrow, 8/28/24 is the patient's discharge date.  Eric was also told that the patient has 2 upcoming physicians appointments, one at a clinic that offers mental health services, the other with his primary care physician.    Eric added that he and other family members are not able to  the patient from the hospital.  Some family members are a \"second party\" in the order of protection and are not able to have prolonged contact with the patient.    This writer informed him that a [facility] could be used to transport the patient back to his weekly stay motel.    Eric was asked if he has any safety concerns that the patient could be a danger to himself or to others if he is discharged tomorrow.  rEic answered that today, he has no safety concerns.  On the phone, the patient sounds calm and does not seem angry.  Eric does fear that if the patient returns to abusing alcohol, threatening behavior will return.

## 2024-08-29 ENCOUNTER — ANESTHESIA (OUTPATIENT)
Dept: SURGERY | Facility: HOSPITAL | Age: 69
End: 2024-08-29
Payer: MEDICARE

## 2024-08-29 ENCOUNTER — HOSPITAL ENCOUNTER (OUTPATIENT)
Facility: HOSPITAL | Age: 69
Discharge: HOME | End: 2024-08-30
Attending: NEUROLOGICAL SURGERY | Admitting: NEUROLOGICAL SURGERY
Payer: MEDICARE

## 2024-08-29 DIAGNOSIS — G89.4 CHRONIC PAIN SYNDROME: Primary | ICD-10-CM

## 2024-08-29 DIAGNOSIS — E11.65 HYPERGLYCEMIA DUE TO DIABETES MELLITUS: ICD-10-CM

## 2024-08-29 DIAGNOSIS — G56.42 COMPLEX REGIONAL PAIN SYNDROME TYPE 2 OF LEFT UPPER EXTREMITY: ICD-10-CM

## 2024-08-29 DIAGNOSIS — Z79.4 TYPE 2 DIABETES MELLITUS WITH DIABETIC POLYNEUROPATHY, WITH LONG-TERM CURRENT USE OF INSULIN: Chronic | ICD-10-CM

## 2024-08-29 DIAGNOSIS — E11.42 TYPE 2 DIABETES MELLITUS WITH DIABETIC POLYNEUROPATHY, WITH LONG-TERM CURRENT USE OF INSULIN: Chronic | ICD-10-CM

## 2024-08-29 LAB
ABO + RH BLD: NORMAL
BLD GP AB SCN CELLS X3 SERPL QL: NORMAL
POCT GLUCOSE: 157 MG/DL (ref 70–110)
POCT GLUCOSE: 239 MG/DL (ref 70–110)
POCT GLUCOSE: 254 MG/DL (ref 70–110)
POCT GLUCOSE: 339 MG/DL (ref 70–110)
SPECIMEN OUTDATE: NORMAL

## 2024-08-29 PROCEDURE — 36000707: Performed by: NEUROLOGICAL SURGERY

## 2024-08-29 PROCEDURE — 63600175 PHARM REV CODE 636 W HCPCS

## 2024-08-29 PROCEDURE — C1778 LEAD, NEUROSTIMULATOR: HCPCS | Performed by: NEUROLOGICAL SURGERY

## 2024-08-29 PROCEDURE — 94761 N-INVAS EAR/PLS OXIMETRY MLT: CPT

## 2024-08-29 PROCEDURE — 36000706: Performed by: NEUROLOGICAL SURGERY

## 2024-08-29 PROCEDURE — 36415 COLL VENOUS BLD VENIPUNCTURE: CPT | Performed by: NEUROLOGICAL SURGERY

## 2024-08-29 PROCEDURE — 25000003 PHARM REV CODE 250: Performed by: STUDENT IN AN ORGANIZED HEALTH CARE EDUCATION/TRAINING PROGRAM

## 2024-08-29 PROCEDURE — 71000039 HC RECOVERY, EACH ADD'L HOUR: Performed by: NEUROLOGICAL SURGERY

## 2024-08-29 PROCEDURE — 63600175 PHARM REV CODE 636 W HCPCS: Performed by: NEUROLOGICAL SURGERY

## 2024-08-29 PROCEDURE — 86901 BLOOD TYPING SEROLOGIC RH(D): CPT | Performed by: NEUROLOGICAL SURGERY

## 2024-08-29 PROCEDURE — 25000003 PHARM REV CODE 250: Performed by: NEUROLOGICAL SURGERY

## 2024-08-29 PROCEDURE — 25000003 PHARM REV CODE 250

## 2024-08-29 PROCEDURE — 63655 IMPLANT NEUROELECTRODES: CPT | Mod: 22,,, | Performed by: NEUROLOGICAL SURGERY

## 2024-08-29 PROCEDURE — 63600175 PHARM REV CODE 636 W HCPCS: Performed by: STUDENT IN AN ORGANIZED HEALTH CARE EDUCATION/TRAINING PROGRAM

## 2024-08-29 PROCEDURE — 86900 BLOOD TYPING SEROLOGIC ABO: CPT | Performed by: NEUROLOGICAL SURGERY

## 2024-08-29 PROCEDURE — 71000033 HC RECOVERY, INTIAL HOUR: Performed by: NEUROLOGICAL SURGERY

## 2024-08-29 PROCEDURE — 37000009 HC ANESTHESIA EA ADD 15 MINS: Performed by: NEUROLOGICAL SURGERY

## 2024-08-29 PROCEDURE — 36620 INSERTION CATHETER ARTERY: CPT | Mod: 59,,, | Performed by: STUDENT IN AN ORGANIZED HEALTH CARE EDUCATION/TRAINING PROGRAM

## 2024-08-29 PROCEDURE — 94799 UNLISTED PULMONARY SVC/PX: CPT

## 2024-08-29 PROCEDURE — 27201423 OPTIME MED/SURG SUP & DEVICES STERILE SUPPLY: Performed by: NEUROLOGICAL SURGERY

## 2024-08-29 PROCEDURE — 37000008 HC ANESTHESIA 1ST 15 MINUTES: Performed by: NEUROLOGICAL SURGERY

## 2024-08-29 PROCEDURE — C1883 ADAPT/EXT, PACING/NEURO LEAD: HCPCS | Performed by: NEUROLOGICAL SURGERY

## 2024-08-29 DEVICE — LEAD 3MM 60CM PENTA: Type: IMPLANTABLE DEVICE | Site: BACK | Status: FUNCTIONAL

## 2024-08-29 DEVICE — EXTENSION, 30CM: Type: IMPLANTABLE DEVICE | Site: BACK | Status: FUNCTIONAL

## 2024-08-29 RX ORDER — MUPIROCIN 20 MG/G
OINTMENT TOPICAL 2 TIMES DAILY
Status: DISCONTINUED | OUTPATIENT
Start: 2024-08-29 | End: 2024-08-30 | Stop reason: HOSPADM

## 2024-08-29 RX ORDER — GABAPENTIN 400 MG/1
800 CAPSULE ORAL 3 TIMES DAILY
Status: DISCONTINUED | OUTPATIENT
Start: 2024-08-29 | End: 2024-08-30 | Stop reason: HOSPADM

## 2024-08-29 RX ORDER — METHOCARBAMOL 750 MG/1
750 TABLET, FILM COATED ORAL 3 TIMES DAILY
Status: DISCONTINUED | OUTPATIENT
Start: 2024-08-29 | End: 2024-08-30 | Stop reason: HOSPADM

## 2024-08-29 RX ORDER — MIDAZOLAM HYDROCHLORIDE 1 MG/ML
INJECTION, SOLUTION INTRAMUSCULAR; INTRAVENOUS
Status: DISCONTINUED | OUTPATIENT
Start: 2024-08-29 | End: 2024-08-29

## 2024-08-29 RX ORDER — TRAZODONE HYDROCHLORIDE 50 MG/1
50 TABLET ORAL NIGHTLY
Status: DISCONTINUED | OUTPATIENT
Start: 2024-08-29 | End: 2024-08-30 | Stop reason: HOSPADM

## 2024-08-29 RX ORDER — OXYCODONE HYDROCHLORIDE 5 MG/1
10 TABLET ORAL EVERY 6 HOURS PRN
Status: DISCONTINUED | OUTPATIENT
Start: 2024-08-29 | End: 2024-08-30 | Stop reason: HOSPADM

## 2024-08-29 RX ORDER — HYDROMORPHONE HYDROCHLORIDE 2 MG/ML
0.5 INJECTION, SOLUTION INTRAMUSCULAR; INTRAVENOUS; SUBCUTANEOUS EVERY 5 MIN PRN
Status: DISCONTINUED | OUTPATIENT
Start: 2024-08-29 | End: 2024-08-29 | Stop reason: HOSPADM

## 2024-08-29 RX ORDER — TRAMADOL HYDROCHLORIDE 50 MG/1
50 TABLET ORAL EVERY 6 HOURS PRN
Status: DISCONTINUED | OUTPATIENT
Start: 2024-08-29 | End: 2024-08-30 | Stop reason: HOSPADM

## 2024-08-29 RX ORDER — ACETAMINOPHEN 325 MG/1
650 TABLET ORAL EVERY 6 HOURS
Status: DISCONTINUED | OUTPATIENT
Start: 2024-08-29 | End: 2024-08-30 | Stop reason: HOSPADM

## 2024-08-29 RX ORDER — PREGABALIN 75 MG/1
75 CAPSULE ORAL
Status: COMPLETED | OUTPATIENT
Start: 2024-08-29 | End: 2024-08-29

## 2024-08-29 RX ORDER — INSULIN ASPART 100 [IU]/ML
0-15 INJECTION, SOLUTION INTRAVENOUS; SUBCUTANEOUS
Status: DISCONTINUED | OUTPATIENT
Start: 2024-08-29 | End: 2024-08-30 | Stop reason: HOSPADM

## 2024-08-29 RX ORDER — ONDANSETRON HYDROCHLORIDE 2 MG/ML
INJECTION, SOLUTION INTRAVENOUS
Status: DISCONTINUED | OUTPATIENT
Start: 2024-08-29 | End: 2024-08-29

## 2024-08-29 RX ORDER — LEVOTHYROXINE SODIUM 88 UG/1
88 TABLET ORAL
Status: DISCONTINUED | OUTPATIENT
Start: 2024-08-30 | End: 2024-08-30 | Stop reason: HOSPADM

## 2024-08-29 RX ORDER — TACROLIMUS 1 MG/1
1 CAPSULE ORAL EVERY EVENING
Status: DISCONTINUED | OUTPATIENT
Start: 2024-08-29 | End: 2024-08-30 | Stop reason: HOSPADM

## 2024-08-29 RX ORDER — DEXAMETHASONE SODIUM PHOSPHATE 4 MG/ML
INJECTION, SOLUTION INTRA-ARTICULAR; INTRALESIONAL; INTRAMUSCULAR; INTRAVENOUS; SOFT TISSUE
Status: DISCONTINUED | OUTPATIENT
Start: 2024-08-29 | End: 2024-08-29

## 2024-08-29 RX ORDER — INSULIN GLARGINE 100 [IU]/ML
30 INJECTION, SOLUTION SUBCUTANEOUS DAILY
Status: DISCONTINUED | OUTPATIENT
Start: 2024-08-29 | End: 2024-08-30 | Stop reason: HOSPADM

## 2024-08-29 RX ORDER — HYDROMORPHONE HYDROCHLORIDE 2 MG/ML
0.2 INJECTION, SOLUTION INTRAMUSCULAR; INTRAVENOUS; SUBCUTANEOUS EVERY 5 MIN PRN
Status: COMPLETED | OUTPATIENT
Start: 2024-08-29 | End: 2024-08-29

## 2024-08-29 RX ORDER — KETAMINE HCL IN 0.9 % NACL 50 MG/5 ML
SYRINGE (ML) INTRAVENOUS
Status: DISCONTINUED | OUTPATIENT
Start: 2024-08-29 | End: 2024-08-29

## 2024-08-29 RX ORDER — ONDANSETRON 8 MG/1
8 TABLET, ORALLY DISINTEGRATING ORAL EVERY 6 HOURS PRN
Status: DISCONTINUED | OUTPATIENT
Start: 2024-08-29 | End: 2024-08-30 | Stop reason: HOSPADM

## 2024-08-29 RX ORDER — LIDOCAINE HYDROCHLORIDE 10 MG/ML
1 INJECTION, SOLUTION EPIDURAL; INFILTRATION; INTRACAUDAL; PERINEURAL ONCE
Status: DISCONTINUED | OUTPATIENT
Start: 2024-08-29 | End: 2024-08-29 | Stop reason: HOSPADM

## 2024-08-29 RX ORDER — NIFEDIPINE 30 MG/1
30 TABLET, EXTENDED RELEASE ORAL DAILY
Status: DISCONTINUED | OUTPATIENT
Start: 2024-08-29 | End: 2024-08-30 | Stop reason: HOSPADM

## 2024-08-29 RX ORDER — LABETALOL HYDROCHLORIDE 5 MG/ML
INJECTION, SOLUTION INTRAVENOUS
Status: DISCONTINUED | OUTPATIENT
Start: 2024-08-29 | End: 2024-08-29

## 2024-08-29 RX ORDER — SODIUM CHLORIDE 0.9 % (FLUSH) 0.9 %
10 SYRINGE (ML) INJECTION
Status: DISCONTINUED | OUTPATIENT
Start: 2024-08-29 | End: 2024-08-29 | Stop reason: HOSPADM

## 2024-08-29 RX ORDER — HYDRALAZINE HYDROCHLORIDE 20 MG/ML
10 INJECTION INTRAMUSCULAR; INTRAVENOUS EVERY 10 MIN PRN
Status: DISCONTINUED | OUTPATIENT
Start: 2024-08-29 | End: 2024-08-29 | Stop reason: HOSPADM

## 2024-08-29 RX ORDER — HALOPERIDOL 5 MG/ML
0.5 INJECTION INTRAMUSCULAR EVERY 10 MIN PRN
Status: DISCONTINUED | OUTPATIENT
Start: 2024-08-29 | End: 2024-08-29 | Stop reason: HOSPADM

## 2024-08-29 RX ORDER — METOPROLOL TARTRATE 1 MG/ML
INJECTION, SOLUTION INTRAVENOUS
Status: DISCONTINUED | OUTPATIENT
Start: 2024-08-29 | End: 2024-08-29

## 2024-08-29 RX ORDER — IBUPROFEN 200 MG
16 TABLET ORAL
Status: DISCONTINUED | OUTPATIENT
Start: 2024-08-29 | End: 2024-08-30 | Stop reason: HOSPADM

## 2024-08-29 RX ORDER — CELECOXIB 100 MG/1
200 CAPSULE ORAL
Status: COMPLETED | OUTPATIENT
Start: 2024-08-29 | End: 2024-08-29

## 2024-08-29 RX ORDER — FENTANYL CITRATE 50 UG/ML
INJECTION, SOLUTION INTRAMUSCULAR; INTRAVENOUS
Status: DISCONTINUED | OUTPATIENT
Start: 2024-08-29 | End: 2024-08-29

## 2024-08-29 RX ORDER — INSULIN ASPART 100 [IU]/ML
20 INJECTION, SOLUTION INTRAVENOUS; SUBCUTANEOUS
Status: DISCONTINUED | OUTPATIENT
Start: 2024-08-29 | End: 2024-08-30 | Stop reason: HOSPADM

## 2024-08-29 RX ORDER — HYDROMORPHONE HYDROCHLORIDE 1 MG/ML
1 INJECTION, SOLUTION INTRAMUSCULAR; INTRAVENOUS; SUBCUTANEOUS
Status: DISCONTINUED | OUTPATIENT
Start: 2024-08-29 | End: 2024-08-30 | Stop reason: HOSPADM

## 2024-08-29 RX ORDER — INSULIN GLARGINE 100 [IU]/ML
40 INJECTION, SOLUTION SUBCUTANEOUS DAILY
Status: DISCONTINUED | OUTPATIENT
Start: 2024-08-29 | End: 2024-08-29

## 2024-08-29 RX ORDER — SODIUM CHLORIDE, SODIUM LACTATE, POTASSIUM CHLORIDE, CALCIUM CHLORIDE 600; 310; 30; 20 MG/100ML; MG/100ML; MG/100ML; MG/100ML
INJECTION, SOLUTION INTRAVENOUS CONTINUOUS
Status: DISCONTINUED | OUTPATIENT
Start: 2024-08-29 | End: 2024-08-29

## 2024-08-29 RX ORDER — IBUPROFEN 200 MG
24 TABLET ORAL
Status: DISCONTINUED | OUTPATIENT
Start: 2024-08-29 | End: 2024-08-30 | Stop reason: HOSPADM

## 2024-08-29 RX ORDER — OXYCODONE HCL 10 MG/1
10 TABLET, FILM COATED, EXTENDED RELEASE ORAL
Status: COMPLETED | OUTPATIENT
Start: 2024-08-29 | End: 2024-08-29

## 2024-08-29 RX ORDER — CLINDAMYCIN HYDROCHLORIDE 150 MG/1
150 CAPSULE ORAL EVERY 6 HOURS
Status: DISCONTINUED | OUTPATIENT
Start: 2024-08-29 | End: 2024-08-30 | Stop reason: HOSPADM

## 2024-08-29 RX ORDER — METOPROLOL SUCCINATE 50 MG/1
200 TABLET, EXTENDED RELEASE ORAL DAILY
Status: DISCONTINUED | OUTPATIENT
Start: 2024-08-29 | End: 2024-08-30 | Stop reason: HOSPADM

## 2024-08-29 RX ORDER — ALUMINUM HYDROXIDE, MAGNESIUM HYDROXIDE, AND SIMETHICONE 1200; 120; 1200 MG/30ML; MG/30ML; MG/30ML
30 SUSPENSION ORAL EVERY 4 HOURS PRN
Status: DISCONTINUED | OUTPATIENT
Start: 2024-08-29 | End: 2024-08-30 | Stop reason: HOSPADM

## 2024-08-29 RX ORDER — TACROLIMUS 1 MG/1
2 CAPSULE ORAL EVERY MORNING
Status: DISCONTINUED | OUTPATIENT
Start: 2024-08-29 | End: 2024-08-30 | Stop reason: HOSPADM

## 2024-08-29 RX ORDER — CEFAZOLIN 2 G/1
INJECTION, POWDER, FOR SOLUTION INTRAMUSCULAR; INTRAVENOUS
Status: DISCONTINUED | OUTPATIENT
Start: 2024-08-29 | End: 2024-08-29

## 2024-08-29 RX ORDER — GLUCAGON 1 MG
1 KIT INJECTION
Status: DISCONTINUED | OUTPATIENT
Start: 2024-08-29 | End: 2024-08-30 | Stop reason: HOSPADM

## 2024-08-29 RX ORDER — METOPROLOL SUCCINATE 50 MG/1
200 TABLET, EXTENDED RELEASE ORAL DAILY
Status: DISCONTINUED | OUTPATIENT
Start: 2024-08-29 | End: 2024-08-29 | Stop reason: HOSPADM

## 2024-08-29 RX ORDER — SUCCINYLCHOLINE CHLORIDE 20 MG/ML
INJECTION INTRAMUSCULAR; INTRAVENOUS
Status: DISCONTINUED | OUTPATIENT
Start: 2024-08-29 | End: 2024-08-29

## 2024-08-29 RX ORDER — AMOXICILLIN 250 MG
2 CAPSULE ORAL NIGHTLY PRN
Status: DISCONTINUED | OUTPATIENT
Start: 2024-08-29 | End: 2024-08-30 | Stop reason: HOSPADM

## 2024-08-29 RX ORDER — METOPROLOL TARTRATE 50 MG/1
200 TABLET ORAL DAILY
Status: DISCONTINUED | OUTPATIENT
Start: 2024-08-29 | End: 2024-08-29

## 2024-08-29 RX ORDER — ACETAMINOPHEN 325 MG/1
650 TABLET ORAL
Status: COMPLETED | OUTPATIENT
Start: 2024-08-29 | End: 2024-08-29

## 2024-08-29 RX ORDER — METHOCARBAMOL 100 MG/ML
1000 INJECTION, SOLUTION INTRAMUSCULAR; INTRAVENOUS ONCE
Status: COMPLETED | OUTPATIENT
Start: 2024-08-29 | End: 2024-08-29

## 2024-08-29 RX ORDER — PROPOFOL 10 MG/ML
VIAL (ML) INTRAVENOUS
Status: DISCONTINUED | OUTPATIENT
Start: 2024-08-29 | End: 2024-08-29

## 2024-08-29 RX ORDER — GLUCAGON 1 MG
1 KIT INJECTION
Status: DISCONTINUED | OUTPATIENT
Start: 2024-08-29 | End: 2024-08-29 | Stop reason: HOSPADM

## 2024-08-29 RX ORDER — PROCHLORPERAZINE EDISYLATE 5 MG/ML
5 INJECTION INTRAMUSCULAR; INTRAVENOUS EVERY 6 HOURS PRN
Status: DISCONTINUED | OUTPATIENT
Start: 2024-08-29 | End: 2024-08-30 | Stop reason: HOSPADM

## 2024-08-29 RX ORDER — SODIUM CHLORIDE, SODIUM LACTATE, POTASSIUM CHLORIDE, CALCIUM CHLORIDE 600; 310; 30; 20 MG/100ML; MG/100ML; MG/100ML; MG/100ML
INJECTION, SOLUTION INTRAVENOUS CONTINUOUS
Status: DISCONTINUED | OUTPATIENT
Start: 2024-08-29 | End: 2024-08-30

## 2024-08-29 RX ORDER — LIDOCAINE HYDROCHLORIDE 20 MG/ML
INJECTION INTRAVENOUS
Status: DISCONTINUED | OUTPATIENT
Start: 2024-08-29 | End: 2024-08-29

## 2024-08-29 RX ADMIN — SODIUM CHLORIDE: 0.9 INJECTION, SOLUTION INTRAVENOUS at 08:08

## 2024-08-29 RX ADMIN — HYDROMORPHONE HYDROCHLORIDE 0.2 MG: 2 INJECTION INTRAMUSCULAR; INTRAVENOUS; SUBCUTANEOUS at 11:08

## 2024-08-29 RX ADMIN — MUPIROCIN: 20 OINTMENT TOPICAL at 09:08

## 2024-08-29 RX ADMIN — OXYCODONE HYDROCHLORIDE 10 MG: 5 TABLET ORAL at 11:08

## 2024-08-29 RX ADMIN — HYDRALAZINE HYDROCHLORIDE 10 MG: 20 INJECTION, SOLUTION INTRAMUSCULAR; INTRAVENOUS at 11:08

## 2024-08-29 RX ADMIN — SODIUM CHLORIDE 0.2 MCG/KG/MIN: 9 INJECTION, SOLUTION INTRAVENOUS at 07:08

## 2024-08-29 RX ADMIN — INSULIN ASPART 9 UNITS: 100 INJECTION, SOLUTION INTRAVENOUS; SUBCUTANEOUS at 04:08

## 2024-08-29 RX ADMIN — HYDROMORPHONE HYDROCHLORIDE 1 MG: 1 INJECTION, SOLUTION INTRAMUSCULAR; INTRAVENOUS; SUBCUTANEOUS at 09:08

## 2024-08-29 RX ADMIN — Medication 10 MG: at 08:08

## 2024-08-29 RX ADMIN — INSULIN GLARGINE 30 UNITS: 100 INJECTION, SOLUTION SUBCUTANEOUS at 04:08

## 2024-08-29 RX ADMIN — LABETALOL HYDROCHLORIDE 10 MG: 5 INJECTION, SOLUTION INTRAVENOUS at 10:08

## 2024-08-29 RX ADMIN — Medication 20 MG: at 07:08

## 2024-08-29 RX ADMIN — OXYCODONE HYDROCHLORIDE 10 MG: 10 TABLET, FILM COATED, EXTENDED RELEASE ORAL at 06:08

## 2024-08-29 RX ADMIN — TRAMADOL HYDROCHLORIDE 50 MG: 50 TABLET, COATED ORAL at 05:08

## 2024-08-29 RX ADMIN — METHOCARBAMOL 1000 MG: 100 INJECTION INTRAMUSCULAR; INTRAVENOUS at 11:08

## 2024-08-29 RX ADMIN — INSULIN ASPART 8 UNITS: 100 INJECTION, SOLUTION INTRAVENOUS; SUBCUTANEOUS at 09:08

## 2024-08-29 RX ADMIN — METHOCARBAMOL TABLETS 750 MG: 750 TABLET, COATED ORAL at 09:08

## 2024-08-29 RX ADMIN — PROPOFOL 120 MG: 10 INJECTION, EMULSION INTRAVENOUS at 07:08

## 2024-08-29 RX ADMIN — CLINDAMYCIN HYDROCHLORIDE 150 MG: 150 CAPSULE ORAL at 11:08

## 2024-08-29 RX ADMIN — METHOCARBAMOL TABLETS 750 MG: 750 TABLET, COATED ORAL at 02:08

## 2024-08-29 RX ADMIN — SODIUM CHLORIDE: 0.9 INJECTION, SOLUTION INTRAVENOUS at 07:08

## 2024-08-29 RX ADMIN — GABAPENTIN 800 MG: 400 CAPSULE ORAL at 02:08

## 2024-08-29 RX ADMIN — HYDROMORPHONE HYDROCHLORIDE 1 MG: 1 INJECTION, SOLUTION INTRAMUSCULAR; INTRAVENOUS; SUBCUTANEOUS at 02:08

## 2024-08-29 RX ADMIN — TACROLIMUS 2 MG: 1 CAPSULE ORAL at 02:08

## 2024-08-29 RX ADMIN — SODIUM CHLORIDE, POTASSIUM CHLORIDE, SODIUM LACTATE AND CALCIUM CHLORIDE: 600; 310; 30; 20 INJECTION, SOLUTION INTRAVENOUS at 02:08

## 2024-08-29 RX ADMIN — PROPOFOL 200 MG: 10 INJECTION, EMULSION INTRAVENOUS at 07:08

## 2024-08-29 RX ADMIN — GABAPENTIN 800 MG: 400 CAPSULE ORAL at 09:08

## 2024-08-29 RX ADMIN — TRAZODONE HYDROCHLORIDE 50 MG: 50 TABLET ORAL at 09:08

## 2024-08-29 RX ADMIN — MIDAZOLAM 2 MG: 1 INJECTION INTRAMUSCULAR; INTRAVENOUS at 07:08

## 2024-08-29 RX ADMIN — ACETAMINOPHEN 650 MG: 325 TABLET ORAL at 11:08

## 2024-08-29 RX ADMIN — SUCCINYLCHOLINE CHLORIDE 140 MG: 20 INJECTION, SOLUTION INTRAMUSCULAR; INTRAVENOUS at 07:08

## 2024-08-29 RX ADMIN — INSULIN ASPART 20 UNITS: 100 INJECTION, SOLUTION INTRAVENOUS; SUBCUTANEOUS at 04:08

## 2024-08-29 RX ADMIN — NIFEDIPINE 30 MG: 30 TABLET, FILM COATED, EXTENDED RELEASE ORAL at 12:08

## 2024-08-29 RX ADMIN — ONDANSETRON 4 MG: 2 INJECTION, SOLUTION INTRAMUSCULAR; INTRAVENOUS at 10:08

## 2024-08-29 RX ADMIN — METOPROLOL TARTRATE 5 MG: 1 INJECTION, SOLUTION INTRAVENOUS at 07:08

## 2024-08-29 RX ADMIN — HYDROMORPHONE HYDROCHLORIDE 0.5 MG: 2 INJECTION INTRAMUSCULAR; INTRAVENOUS; SUBCUTANEOUS at 12:08

## 2024-08-29 RX ADMIN — DEXAMETHASONE SODIUM PHOSPHATE 8 MG: 4 INJECTION, SOLUTION INTRA-ARTICULAR; INTRALESIONAL; INTRAMUSCULAR; INTRAVENOUS; SOFT TISSUE at 07:08

## 2024-08-29 RX ADMIN — CLINDAMYCIN HYDROCHLORIDE 150 MG: 150 CAPSULE ORAL at 05:08

## 2024-08-29 RX ADMIN — Medication 10 MG: at 09:08

## 2024-08-29 RX ADMIN — METOPROLOL SUCCINATE 200 MG: 50 TABLET, EXTENDED RELEASE ORAL at 02:08

## 2024-08-29 RX ADMIN — GABAPENTIN 800 MG: 300 CAPSULE ORAL at 12:08

## 2024-08-29 RX ADMIN — PREGABALIN 75 MG: 75 CAPSULE ORAL at 06:08

## 2024-08-29 RX ADMIN — PROPOFOL 50 MG: 10 INJECTION, EMULSION INTRAVENOUS at 07:08

## 2024-08-29 RX ADMIN — METOPROLOL SUCCINATE 200 MG: 50 TABLET, EXTENDED RELEASE ORAL at 11:08

## 2024-08-29 RX ADMIN — CELECOXIB 200 MG: 100 CAPSULE ORAL at 06:08

## 2024-08-29 RX ADMIN — FENTANYL CITRATE 100 MCG: 50 INJECTION INTRAMUSCULAR; INTRAVENOUS at 07:08

## 2024-08-29 RX ADMIN — LIDOCAINE HYDROCHLORIDE 100 MG: 20 INJECTION, SOLUTION INTRAVENOUS at 07:08

## 2024-08-29 RX ADMIN — TACROLIMUS 1 MG: 1 CAPSULE ORAL at 05:08

## 2024-08-29 RX ADMIN — HYDROMORPHONE HYDROCHLORIDE 0.2 MG: 2 INJECTION INTRAMUSCULAR; INTRAVENOUS; SUBCUTANEOUS at 10:08

## 2024-08-29 RX ADMIN — ACETAMINOPHEN 650 MG: 325 TABLET ORAL at 06:08

## 2024-08-29 RX ADMIN — HYDROMORPHONE HYDROCHLORIDE 1 MG: 1 INJECTION, SOLUTION INTRAMUSCULAR; INTRAVENOUS; SUBCUTANEOUS at 06:08

## 2024-08-29 RX ADMIN — PROPOFOL 30 MG: 10 INJECTION, EMULSION INTRAVENOUS at 07:08

## 2024-08-29 RX ADMIN — CEFAZOLIN 2 G: 2 INJECTION, POWDER, FOR SOLUTION INTRAMUSCULAR; INTRAVENOUS at 07:08

## 2024-08-29 RX ADMIN — ACETAMINOPHEN 650 MG: 325 TABLET ORAL at 05:08

## 2024-08-29 NOTE — OP NOTE
Date of surgery 08/29/2024    Preop diagnosis   1. Chronic pain syndrome   2. Complex regional pain syndrome type 2 left upper extremity    Postop diagnosis   Same    Surgery   1. Occipital to C2 laminectomy for retrograde placement of a spinal cord stimulator paddle lead, Abbott, Penta  2. Neuro monitoring using EMG, SSEP, MEP    Surgeon   Khadar Payne MD    Complexity   This was deemed to be a more complex procedure due to the need of placing some extension for this paddle lead trial, also the complex anatomy of the occipital to C2 region require at least 1 hour more for the dissection and laminectomy portion of the surgery.    Indication     01/29/24  This is a very pleasant 68 y.o. male, liver transplant, chronic kidney disease stage 3, type 2 diabetes not well-controlled, status post C4-7 anterior cervical fusion for cervical spondylosis with myelopathy and radiculopathy performed by Dr. Christian on 08/03/2022.  Patient reports left arm pain and hand pain in the C8 distribution becoming progressively worse.  The pain was present before his spine surgery, did not improve after surgery and now has become worse.  His chronic gait imbalance has remained stable.  His bilateral hand numbness and hand weakness have remained stable.  He underwent a left C7-T1 transforaminal epidural steroid injection the pain management in May 2023 with no significant pain relief         INTERIM HISTORY:  03/06/24     Completed a CT and MRI of the cervical spine which did not show significant cord compression.  He has persistent severe foraminal stenosis at C4-5, C5-6, C6-7.  There is no significant foraminal stenosis at C3-4.  The patient complains of persistent left arm pain radiating down all his fingers and thumb on the left side.  Pain is associated with numbness.  He has difficulty using his left arm.  He reports some weakness.  His gait imbalance has remained quite stable.  The pain is affecting his quality of life and functional  status.  Pain is constant.  He denies any change of temperature, edema, trophic changes.    Procedure   The patient was intubated under general anesthesia and positioned prone on bolsters, the head fixated in a slight flexion with the Denny 3 pins head burroughs.  All pressure points were carefully padded and the shoulders were gently taped inferiorly.  The posterior hairline was shaved and the occipital cervical area was prepped and draped in a typical sterile fashion.  We then planned a midline incision.  Local anesthesia with 1% lidocaine with epi.  The skin was incised and hemostasis was carried out.  Subperiosteal dissection to expose the inferior border of the occipital bone, the C1 posterior arch and the superior C2 lamina.  Under microscopic visualization we drilled the posterior C1 posterior arch and removed it.  The inferior border of the occipital rim was also drilled and the dura was dissected.  The ligamentous attachments were dissected to free up the dura completely.  We also performed a superior C2 laminectomy and dissected the epidural space inferiorly.  Irrigation hemostasis.  We then placed our paddle lead through a retrograde fashion from C1-C2.  We used 3-0 silk to anchored the paddle lead in place using soft tissue anchor points.  Good positioning of the paddle lead was confirmed with fluoroscopy.  We then created a temporary pocket site over the left thoracic area in the subcutaneous tissue.  The skin was incised and a pocket was created with the Bovie.  We then tunneled the distal extremity of the paddle lead towards the pocket site.  We then connected to temporary extensions and the locking mechanism was engaged.  The extensions were then tunneled towards the right side through a stab incision.  The leads were tested to make sure that impedance were satisfactory.  We also proceeded with intraoperative stimulation confirmed good EMG response on the left side as well as on the right side.   Irrigation and hemostasis.  Placement of vancomycin powder.  The posterior cervical incision was closed 1st.  The muscular fascia was closed with interrupted 0 Vicryl.  The dermal layer was closed with inverted interrupted 2-0 Vicryl.  The skin was closed with staples.  The pocket site was closed by closing the dermal layer with interrupted 2-0 Vicryl and the skin with staples.  No complication.  Neuro monitoring signal remained at baseline throughout the case.  Blood loss was estimated at 100 cc.

## 2024-08-29 NOTE — TRANSFER OF CARE
"Anesthesia Transfer of Care Note    Patient: Vick Gonzalez    Procedure(s) Performed: Procedure(s) (LRB):  LAMINECTOMY, SPINE, FOR NEUROSTIMULATOR ELECTRODE INSERTION (N/A)    Patient location: PACU    Anesthesia Type: general    Transport from OR: Transported from OR on 6-10 L/min O2 by face mask with adequate spontaneous ventilation    Post pain: adequate analgesia    Post assessment: no apparent anesthetic complications and tolerated procedure well    Post vital signs: stable    Level of consciousness: awake, alert and oriented    Nausea/Vomiting: no nausea/vomiting    Complications: none    Transfer of care protocol was followed      Last vitals: Visit Vitals  BP (!) 160/87   Pulse 87   Temp 37.3 °C (99.2 °F)   Resp 18   Ht 6' 2" (1.88 m)   Wt 117.9 kg (259 lb 14.8 oz)   SpO2 96%   BMI 33.37 kg/m²     "

## 2024-08-29 NOTE — ANESTHESIA PROCEDURE NOTES
Intubation    Date/Time: 8/29/2024 7:19 AM    Performed by: Enid Braun DO  Authorized by: Iglesia Guadarrama MD    Intubation:     Induction:  Intravenous    Intubated:  Postinduction    Mask Ventilation:  Easy with oral airway    Attempts:  1    Attempted By:  Resident anesthesiologist    Method of Intubation:  Video laryngoscopy    Blade:  Nagel 3    Laryngeal View Grade: Grade I - full view of cords      Difficult Airway Encountered?: No      Complications:  None    Airway Device:  Oral endotracheal tube    Airway Device Size:  7.5    Style/Cuff Inflation:  Cuffed    Tube secured:  22    Secured at:  The lips    Placement Verified By:  Capnometry    Complicating Factors:  None    Findings Post-Intubation:  BS equal bilateral

## 2024-08-29 NOTE — PLAN OF CARE
Problem: Adult Inpatient Plan of Care  Goal: Plan of Care Review  Outcome: Progressing  Goal: Patient-Specific Goal (Individualized)  Outcome: Progressing  Goal: Absence of Hospital-Acquired Illness or Injury  Outcome: Progressing  Goal: Optimal Comfort and Wellbeing  Outcome: Progressing  Goal: Readiness for Transition of Care  Outcome: Progressing     Problem: Diabetes Comorbidity  Goal: Blood Glucose Level Within Targeted Range  Outcome: Progressing     Problem: Wound  Goal: Optimal Coping  Outcome: Progressing  Goal: Optimal Functional Ability  Outcome: Progressing  Goal: Absence of Infection Signs and Symptoms  Outcome: Progressing  Goal: Improved Oral Intake  Outcome: Progressing  Goal: Optimal Pain Control and Function  Outcome: Progressing  Goal: Skin Health and Integrity  Outcome: Progressing  Goal: Optimal Wound Healing  Outcome: Progressing     Patient is AAOx4, NAD noted, VSS.  No complaints of nausea, vomiting throughout shift.  Family at bedside to visit today.  IVF infusing per MAR.  PRN  pain medication given per MAR.  Tolerating diet, oral intake encouraged. Dressing to neck and back intact, neck dressing has moderate amount of drainage. Labs and vitals being monitored.  Accuchecks being monitored. Patient has a device on abdomen to monitor sugar, but aware that we will still be using our machines. Patient free from falls throughout shift, bed alarm on, bed is in lowest position, wheels locked, call light within reach, safety maintained.  Will continue to monitor.

## 2024-08-29 NOTE — PROGRESS NOTES
VIRTUAL NURSE: Cued into patient's room. Permission received per patient to turn camera to view patient. Introduced as VN that will be working with floor nurse this shift. Educated the patient on VN's role in patient care. Plan of care reviewed with patient. Informed patient that staff will round on them every 2 hours but to use call light for any other needs they may have. Also informed of fall risk and fall precautions. Patient verbalized understanding. Call light within reach; bed siderails up x2. Opportunity given for questions and questions answered. Admission assessment questions completed. Patient reports pain 9/10 and states his nurse is aware and medicated him per MD orders.     08/29/24 1526   Admission   Initial VN Admission Questions Complete   Communication Issues? None   Shift   Virtual Nurse - Patient Verbalized Approval Of Camera Use   Safety/Activity   Patient Rounds bed in low position;placement of personal items at bedside;call light in patient/parent reach;visualized patient   Safety Promotion/Fall Prevention assistive device/personal item within reach;Fall Risk reviewed with patient/family;instructed to call staff for mobility;side rails raised x 3   Positioning   Body Position position maintained   Head of Bed (HOB) Positioning HOB at 30-45 degrees   Pain/Comfort/Sleep   Preferred Pain Scale number (Numeric Rating Pain Scale)   Pain Rating (0-10): Rest 9

## 2024-08-29 NOTE — H&P
NEUROSURGICAL PROGRESS NOTE     DATE OF SERVICE:  08/29/24     ATTENDING PHYSICIAN:  Khadar Payne MD     SUBJECTIVE:  01/29/24  This is a very pleasant 68 y.o. male, liver transplant, chronic kidney disease stage 3, type 2 diabetes not well-controlled, status post C4-7 anterior cervical fusion for cervical spondylosis with myelopathy and radiculopathy performed by Dr. Christian on 08/03/2022.  Patient reports left arm pain and hand pain in the C8 distribution becoming progressively worse.  The pain was present before his spine surgery, did not improve after surgery and now has become worse.  His chronic gait imbalance has remained stable.  His bilateral hand numbness and hand weakness have remained stable.  He underwent a left C7-T1 transforaminal epidural steroid injection the pain management in May 2023 with no significant pain relief         INTERIM HISTORY:  03/06/24     Completed a CT and MRI of the cervical spine which did not show significant cord compression.  He has persistent severe foraminal stenosis at C4-5, C5-6, C6-7.  There is no significant foraminal stenosis at C3-4.  The patient complains of persistent left arm pain radiating down all his fingers and thumb on the left side.  Pain is associated with numbness.  He has difficulty using his left arm.  He reports some weakness.  His gait imbalance has remained quite stable.  The pain is affecting his quality of life and functional status.  Pain is constant.  He denies any change of temperature, edema, trophic changes.           PAST MEDICAL HISTORY:       Active Ambulatory Problems     Diagnosis Date Noted    Chronic pain syndrome 07/13/2011    Acquired hypothyroidism      History of hepatitis C, s/p successful Rx w/ [id number] (cure) - 5/2018 08/23/2011    Gout, unspecified 03/14/2011    Substance abuse 10/24/2010    Thrombocytopenia 06/16/2010    Anxiety      Lumbar radiculopathy 04/08/2015    Acquired spondylolisthesis 05/12/2015    Essential hypertension  06/19/2015    Type 2 diabetes mellitus with diabetic polyneuropathy, with long-term current use of insulin 10/04/2016    S/P liver transplant 10/04/2016    Hypertriglyceridemia 10/05/2016    CKD (chronic kidney disease), stage III      PAD (peripheral artery disease) 08/30/2017    Erectile dysfunction due to arterial insufficiency 10/09/2017    BPH with urinary obstruction 10/09/2017    Immunosuppressed status 10/16/2017    Low testosterone in male 07/25/2018    Hypertension associated with diabetes 12/01/2020    Hyperlipidemia associated with type 2 diabetes mellitus 12/01/2020    Erectile dysfunction associated with type 2 diabetes mellitus 12/01/2020    Claudication in peripheral vascular disease 12/01/2020    Aortic atherosclerosis 11/16/2010    Calcified granuloma of lung 10/22/2010    Carpal tunnel syndrome of left wrist 07/13/2021    Acute congestive heart failure 05/12/2023    Alcohol dependence, in remission 05/26/2023    Pulmonary emphysema, unspecified emphysema type 05/26/2023    Coronary artery disease involving native coronary artery of native heart with angina pectoris 08/24/2023    Epistaxis 12/04/2023           Resolved Ambulatory Problems     Diagnosis Date Noted    Abdominal pain, generalized 08/25/2011    Abdominal pain, right upper quadrant 09/16/2010    Abdominal tenderness, right upper quadrant 05/15/2011    Unspecified chronic liver disease without mention of alcohol      Hypertension      Acute alcoholic hepatitis 08/02/2010    Acute bronchitis 03/14/2011    Acute gouty arthropathy 08/28/2011    Aftercare following organ transplant 06/17/2010    Alcoholic cirrhosis of liver 10/11/2010    Anemia of other chronic disease 07/15/2010    Cholangitis 07/15/2010    Chronic hepatitis C with hepatic coma 06/08/2011    Cirrhosis of liver without mention of alcohol 07/28/2011    Complications of transplanted liver 05/11/2011    Dietary surveillance and counseling 07/28/2011    Encephalopathy,  unspecified 06/16/2010    Family history of diabetes mellitus 07/15/2010    Hematuria, unspecified 02/11/2012    Hepatomegaly 08/23/2011    Liver replaced by transplant 08/23/2011    Encounter for long-term (current) use of steroids 06/02/2011    Encounter for long-term (current) use of aspirin 01/16/2011    Microscopic hematuria 05/11/2011    Mononeuritis of unspecified site 07/15/2010    Need for prophylactic immunotherapy 06/02/2011    Nocturia 11/02/2010    Alcohol abuse, in remission 06/02/2010    Obstruction of bile duct 10/24/2010    Open wound of finger(s) , without mention of complication 07/12/2011    Other and unspecified alcohol dependence, unspecified drinking behavior 01/16/2011    Other ascites 06/16/2010    Other cells and casts in urine 11/02/2010    Other sequelae of chronic liver disease 07/28/2011    Other specified disorders of biliary tract 10/23/2010    Other specified disorders of liver 09/26/2010    Peptic ulcer, unspecified site, unspecified as acute or chronic, without mention of hemorrhage, perforation, or obstruction 10/20/2010    Personal history of other infectious and parasitic disease 11/15/2010    Portal hypertension 07/26/2010    Secondary diabetes mellitus without mention of complication, uncontrolled 10/24/2010    Unspecified disorder of male genital organs 11/02/2010    Genital herpes, unspecified 06/02/2011    Orchitis and epididymitis, unspecified 10/12/2010    Unspecified viral hepatitis C without hepatic coma 10/20/2010    Hepatitis C 12/10/2012    Genital herpes      Hyperpigmentation 10/21/2014    Diskitis 01/15/2015    Lower back pain 01/15/2015    Discitis of lumbosacral region 01/16/2015    Lumbar discitis 06/15/2015    Lumbar myelopathy 06/18/2015    Abnormal gait 07/09/2015    Muscle weakness 07/09/2015    LBP (low back pain) 07/09/2015    S/P lumbar spinal fusion 08/11/2015    Hyponatremia 10/04/2016    OSMANY (acute kidney injury) 10/04/2016    Volume depletion  02/28/2017    Ischemic leg 08/30/2017    Sacroiliitis 12/02/2019    Abdominal pain 01/23/2020    SBO (small bowel obstruction)      Sepsis 11/11/2020    Transaminitis 11/11/2020    Right lower quadrant abdominal pain 11/12/2020    Diarrhea 11/13/2020    Severe obesity (BMI 35.0-39.9) with comorbidity 12/01/2020    Diabetic polyneuropathy associated with type 2 diabetes mellitus 12/01/2020    Penetrating foot wound 10/11/2021    Diabetic foot infection 10/11/2021    Left hand weakness 02/01/2022    Fine motor impairment 02/14/2022    Loss of feeling or sensation 02/14/2022    Myeloradiculopathy 08/22/2022    Chronic neck pain with history of cervical spinal surgery 01/10/2023    Hypomagnesemia 05/13/2023    NSTEMI (non-ST elevated myocardial infarction) 08/02/2023           Past Medical History:   Diagnosis Date    Anemia      Cataract      Diabetes mellitus type II, uncontrolled      ED (erectile dysfunction)      Encounter for blood transfusion      Gout, arthritis      History of alcohol abuse      History of positive PPD, treatment status unknown      History of substance abuse      Hypothyroidism      Pancreatitis 2016    Peptic ulcer disease           PAST SURGICAL HISTORY:        Past Surgical History:   Procedure Laterality Date    ANTERIOR CERVICAL DISCECTOMY W/ FUSION N/A 8/22/2022     Procedure: Procedure: ACDF 4-7 LOS: 4.0 Anesthesia: General Blood: Type & Screen Radiology: C-Arm Microscope: ------- SNS: EMG, SEP, MEP Brace: Marcellus Bed: Regular Bed, Shoulder Strap Headrest:------ Position: Supine Equipment: Depuy;  Surgeon: Titi Christian MD;  Location: [facility] OR;  Service: Neurosurgery;  Laterality: N/A;  Depuy  confirmed CW 8/19  Neuro monitoring confirmed CW 8/19    CARPAL TUNNEL RELEASE Left 10/29/2021     Procedure: RELEASE, CARPAL TUNNEL;  Surgeon: Jameson Alejo Jr., MD;  Location: [facility] OR;  Service: Orthopedics;  Laterality: Left;    CHOLECYSTECTOMY        CORONARY ANGIOGRAPHY N/A 8/2/2023      Procedure: ANGIOGRAM, CORONARY ARTERY;  Surgeon: Juan Vera MD;  Location: [facility] CATH LAB/EP;  Service: Cardiology;  Laterality: N/A;    DECOMPRESSION OF CERVICAL SPINE BY ANTERIOR APPROACH WITH FUSION   8/22/2022     Procedure: DECOMPRESSION AND FUSION, SPINE, CERVICAL, ANTERIOR APPROACH;  Surgeon: Titi Christian MD;  Location: [facility] OR;  Service: Neurosurgery;;    INJECTION OF JOINT Right 12/2/2019     Procedure: INJECTION, JOINT SI;  Surgeon: Thu Penn MD;  Location: [facility] PAIN MGT;  Service: Pain Management;  Laterality: Right;  RT SI JNT INJ    LEFT HEART CATHETERIZATION Left 8/2/2023     Procedure: Left heart cath;  Surgeon: Juan Vera MD;  Location: [facility] CATH LAB/EP;  Service: Cardiology;  Laterality: Left;    LIVER TRANSPLANT   06/2010    SPINE SURGERY        TRANSFORAMINAL EPIDURAL INJECTION OF STEROID Left 5/29/2023     Procedure: INJECTION, STEROID, EPIDURAL, TRANSFORAMINAL APPROACH,  LEFT C7-T1 DIRECT REF;  Surgeon: Thu Penn MD;  Location: [facility] PAIN MGT;  Service: Pain Management;  Laterality: Left;  4/3 MD ILL/PT WILL C/B         SOCIAL HISTORY:   Social History            Socioeconomic History    Marital status:    Tobacco Use    Smoking status: Former       Passive exposure: Never    Smokeless tobacco: Never   Substance and Sexual Activity    Alcohol use: No       Comment: over 5 years ago, none currently    Drug use: Not Currently       Comment: Former cocaine use    Sexual activity: Yes      Social Determinants of Health           Financial Resource Strain: Low Risk  (12/4/2023)     Overall Financial Resource Strain (CARDIA)      Difficulty of Paying Living Expenses: Not very hard   Food Insecurity: No Food Insecurity (12/4/2023)     Hunger Vital Sign      Worried About Running Out of Food in the Last Year: Never true      Ran Out of Food in the Last Year: Never true   Transportation Needs: No Transportation Needs (12/4/2023)     PRAPARE - Transportation      Lack of  Transportation (Medical): No      Lack of Transportation (Non-Medical): No   Physical Activity: Sufficiently Active (12/4/2023)     Exercise Vital Sign      Days of Exercise per Week: 3 days      Minutes of Exercise per Session: 90 min   Stress: No Stress Concern Present (12/4/2023)     [place] [employer] - Occupational Stress Questionnaire      Feeling of Stress : Only a little   Social Connections: Moderately Integrated (12/4/2023)     Social Connection and Isolation Panel [NHANES]      Frequency of Communication with Friends and Family: More than three times a week      Frequency of Social Gatherings with Friends and Family: More than three times a week      Attends [employer] Services: 1 to 4 times per year      Active Member of Clubs or Organizations: No      Attends Club or Organization Meetings: Never      Marital Status:    Housing Stability: Low Risk  (12/4/2023)     Housing Stability Vital Sign      Unable to Pay for Housing in the Last Year: No      Number of Places Lived in the Last Year: 1      Unstable Housing in the Last Year: No         FAMILY HISTORY:        Family History   Problem Relation Age of Onset    Cancer Mother      Diabetes Mother      Heart disease Mother      Diabetes Sister      Cancer Maternal Uncle 82         colon CA    Drug abuse Daughter      Melanoma Neg Hx      Psoriasis Neg Hx      Lupus Neg Hx      Eczema Neg Hx      Acne Neg Hx           CURRENTS MEDICATIONS:  Medications Ordered Prior to Encounter          Current Outpatient Medications on File Prior to Visit   Medication Sig Dispense Refill    albuterol (PROAIR HFA) 90 mcg/actuation inhaler Inhale 2 puffs into the lungs every 6 (six) hours as needed for Wheezing. Rescue 18 g 5    allopurinoL (ZYLOPRIM) 100 MG tablet TAKE 1 TABLET BY MOUTH TWICE A  tablet 3    aluminum-magnesium hydroxide-simethicone (MAALOX) 200-200-20 mg/5 mL Susp Take 30 mLs by mouth 4 (four) times daily before meals and  nightly. (Patient taking differently: Take 30 mLs by mouth every 6 (six) hours as needed.) 769 mL 0    aspirin 81 MG Chew Take 1 tablet (81 mg total) by mouth once daily. 90 tablet 3    atorvastatin (LIPITOR) 40 MG tablet Take 1 tablet (40 mg total) by mouth once daily. 90 tablet 3    blood-glucose meter,continuous (DEXCOM G7 ) Misc Use as directed. 1 each 11    blood-glucose sensor (DEXCOM G7 SENSOR) Viviane Use as directed. 3 each 11    blood-glucose transmitter (DEXCOM G6 TRANSMITTER) Viviane Use as directed 1 each 11    calcium carbonate-vitamin D3 (CALCIUM 600 WITH VITAMIN D3) 600 mg(1,500mg) -400 unit Chew Take 1 tablet by mouth once daily.         clopidogreL (PLAVIX) 75 mg tablet Take 1 tablet (75 mg total) by mouth once daily. 30 tablet 11    cyanocobalamin (VITAMIN B-12) 100 MCG tablet Take 100 mcg by mouth once daily.        diphenhydrAMINE (BENADRYL) 25 mg capsule Take 25 mg by mouth daily as needed for Allergies (Cold).        gabapentin (NEURONTIN) 800 MG tablet TAKE 1 TABLET BY MOUTH THREE TIMES A DAY 90 tablet 11    insulin glargine U-300 conc (TOUJEO MAX U-300 SOLOSTAR) 300 unit/mL (3 mL) insulin pen Inject 40 Units into the skin once daily. 3 mL 11    insulin lispro (HUMALOG KWIKPEN INSULIN) 100 unit/mL pen Inject 20 Units into the skin 3 (three) times daily with meals. 60 mL 1    isosorbide-hydrALAZINE 20-37.5 mg (BIDIL) 20-37.5 mg Tab Take 1 tablet by mouth 3 (three) times daily. 90 tablet 11    lancets 30 gauge Misc 1 lancet by Misc.(Non-Drug; Combo Route) route 4 (four) times daily before meals and nightly. 200 each 11    levothyroxine (SYNTHROID) 88 MCG tablet TAKE 1 TABLET BY MOUTH EVERY DAY 90 tablet 1    lisinopriL (PRINIVIL,ZESTRIL) 20 MG tablet Take 20 mg by mouth.        metoprolol succinate (TOPROL-XL) 200 MG 24 hr tablet TAKE 1 TABLET BY MOUTH EVERY DAY 90 tablet 3    multivit with min-folic acid (MEN'S MULTIVITAMIN GUMMIES) 200 mcg Chew Take 1 tablet by mouth once daily.         "NIFEdipine (PROCARDIA-XL) 30 MG (OSM) 24 hr tablet Take 30 mg by mouth.        NOVOFINE PLUS 32 gauge x 1/6" Ndle          papaverine 30 mg/mL injection Tri-Mix - PGE (alprostadil) 10mcg, papavarine 30mg, phentolamine 1mg; dispense 5mL vial, typical starting is 0.2 mL which is equal to 20 units (Patient taking differently: as needed. Tri-Mix - PGE (alprostadil) 10mcg, papavarine 30mg, phentolamine 1mg; dispense 5mL vial, typical starting is 0.2 mL which is equal to 20 units) 10 mL 5    sacubitriL-valsartan (ENTRESTO) 49-51 mg per tablet Take 1 tablet by mouth 2 (two) times daily. 60 tablet 3    tacrolimus (PROGRAF) 1 MG Cap Take 2 capsules (2 mg total) by mouth every morning AND 1 capsule (1 mg total) every evening. 90 capsule 11    tirzepatide (MOUNJARO) 2.5 mg/0.5 mL PnIj Inject 2.5 mg into the skin every 7 days. 4 Pen 11    traMADoL (ULTRAM) 50 mg tablet Take 1 tablet (50 mg total) by mouth every 6 (six) hours as needed for Pain (severe 7-10 pain only). 6 tablet 0    traZODone (DESYREL) 50 MG tablet TAKE 1 TABLET BY MOUTH EVERY DAY IN THE EVENING 90 tablet 3    TRUE METRIX GLUCOSE TEST STRIP Strp USE 3 TIMES DAILY TO TEST BLOOD GLUCOSE LEVEL 100 strip 11    blood-glucose meter Misc 1 Device by Misc.(Non-Drug; Combo Route) route 3 (three) times daily. 1 each 0    furosemide (LASIX) 20 MG tablet Take 2 tablets (40 mg total) by mouth daily as needed (For Weight Gain > 2-3 lbs in 1 day or 4-6 lbs over 1 week notify PCP and take 40 mg daily for three days). 60 tablet 0    [DISCONTINUED] insulin aspart U-100 (NOVOLOG FLEXPEN U-100 INSULIN) 100 unit/mL (3 mL) InPn pen Inject 20 Units into the skin 3 (three) times daily with meals. 15 mL 11      No current facility-administered medications on file prior to visit.            ALLERGIES:  Review of patient's allergies indicates:  No Known Allergies     REVIEW OF SYSTEMS:  Review of Systems   Constitutional:  Negative for diaphoresis, fever and weight loss.   Respiratory:  " Negative for shortness of breath.    Cardiovascular:  Negative for chest pain.   Gastrointestinal:  Negative for blood in stool.   Genitourinary:  Negative for hematuria.   Endo/Heme/Allergies:  Does not bruise/bleed easily.   All other systems reviewed and are negative.           OBJECTIVE:     PHYSICAL EXAMINATION:       Vitals:     03/06/24 1610   BP: (!) 188/99   Pulse: 84         Physical Exam:  Vitals reviewed.     Constitutional: He appears well-developed and well-nourished.      Eyes: Pupils are equal, round, and reactive to light. Conjunctivae and EOM are normal.      Cardiovascular: Normal distal pulses and no edema.      Abdominal: Soft.      Skin: Skin displays no rash on trunk and no rash on extremities. Skin displays no lesions on trunk and no lesions on extremities.      Psych/Behavior: He is alert. He is oriented to person, place, and time. He has a normal mood and affect.      Musculoskeletal:        Neck: Range of motion is limited.      Neurological:        DTRs: Tricep reflexes are 0 on the right side and 0 on the left side. Bicep reflexes are 0 on the right side and 0 on the left side. Brachioradialis reflexes are 0 on the right side and 0 on the left side. Patellar reflexes are 0 on the right side and 0 on the left side. Achilles reflexes are 0 on the right side and 0 on the left side.         Back Exam      Muscle Strength   Right Quadriceps:  5/5   Left Quadriceps:  5/5   Right Hamstrings:  5/5   Left Hamstrings:  5/5                  SI joint:   Palpation at the right and left SI joints not painful  HUBER test is negative bilaterally  Gaenslen test is negative bilaterally  Thigh thrust test is negative bilaterally     Neurologic Exam      Mental Status   Oriented to person, place, and time.   Speech: speech is normal   Level of consciousness: alert     Cranial Nerves   Cranial nerves II through XII intact.      CN III, IV, VI   Pupils are equal, round, and reactive to light.  Extraocular  motions are normal.      Motor Exam   Muscle bulk: normal  Overall muscle tone: normal     Strength   Right deltoid: 5/5  Left deltoid: 5/5  Right biceps: 5/5  Left biceps: 5/5  Right triceps: 5/5  Left triceps: 5/5  Right wrist flexion: 5/5  Left wrist flexion: 5/5  Right wrist extension: 5/5  Left wrist extension: 5/5  Right interossei: 4/5  Left interossei: 4/5  Right iliopsoas: 5/5  Left iliopsoas: 5/5  Right quadriceps: 5/5  Left quadriceps: 5/5  Right hamstrin/5  Left hamstrin/5  Right anterior tibial: 5/5  Left anterior tibial: 5/5  Right posterior tibial: 5/5  Left posterior tibial: 5/5  Right peroneal: 5/5  Left peroneal: 5/5  Right gastroc: 5/5  Left gastroc: 5/5     Sensory Exam   Light touch normal.   Pinprick normal.      Gait, Coordination, and Reflexes      Reflexes   Right brachioradialis: 0  Left brachioradialis: 0  Right biceps: 0  Left biceps: 0  Right triceps: 0  Left triceps: 0  Right patellar: 0  Left patellar: 0  Right achilles: 0  Left achilles: 0  Right plantar: normal  Left plantar: normal  Right Castro: absent  Left Castro: absent  Right ankle clonus: absent  Left ankle clonus: absent           DIAGNOSTIC DATA:  I personally interpreted the following imaging:   Repeat MRI of the cervical spine shows no cord compression, no intramedullary signal change  CT cervical spine shows consolidation of the fusion from C4-C7     ASSESSMENT:  This is a 68 y.o. male with      Problem List Items Addressed This Visit                  Neuro     Chronic pain syndrome - Primary      Other Visit Diagnoses         Complex regional pain syndrome type 2 of left upper extremity                       PLAN:  We will complete a psychological evaluation for possible spinal cord stimulation trial.  If the patient is found to be an adequate candidate for spinal cord stimulation I would proceed with a paddle lead trial using a retrograde approach at C1-2 in order to improve the patient left arm pain.  Pain  appears neuropathic.  I suspect that he has chronic radiculopathy causing this pain.  I do not think that further surgical decompression or fusion is necessary at this time.  The pain is constant and should respond to spinal cord stimulation.  All questions answered.     More than 50% of the time was spent on discussing conservative management treatments (medication, physical therapy exercises) and possible interventions (spinal injections and surgical procedures). Care coordination was discussed.     30 min          Khadar Payne MD  Cell:[phone]

## 2024-08-29 NOTE — ANESTHESIA POSTPROCEDURE EVALUATION
Anesthesia Post Evaluation    Patient: Vick Gonzalez    Procedure(s) Performed: Procedure(s) (LRB):  LAMINECTOMY, SPINE, FOR NEUROSTIMULATOR ELECTRODE INSERTION (N/A)    Final Anesthesia Type: general      Patient location during evaluation: PACU  Patient participation: Yes- Able to Participate  Level of consciousness: awake  Post-procedure vital signs: reviewed and stable  Pain management: adequate  Airway patency: patent    PONV status at discharge: No PONV  Anesthetic complications: no      Cardiovascular status: blood pressure returned to baseline, stable and hypertensive  Respiratory status: unassisted  Hydration status: euvolemic  Follow-up not needed.              Vitals Value Taken Time   /93 08/29/24 1537   Temp 36.9 °C (98.4 °F) 08/29/24 1537   Pulse 96 08/29/24 1537   Resp 18 08/29/24 1537   SpO2 99 % 08/29/24 1537         Event Time   Out of Recovery 14:24:55         Pain/Reinier Score: Pain Rating Prior to Med Admin: 9 (8/29/2024  2:55 PM)  Pain Rating Post Med Admin: 4 (8/29/2024  2:15 PM)  Reinier Score: 10 (8/29/2024  2:15 PM)

## 2024-08-29 NOTE — ANESTHESIA PROCEDURE NOTES
Arterial    Diagnosis: cervical radiculopathy    Patient location during procedure: done in OR    Staffing  Authorizing Provider: Iglesia Guadarrama MD  Performing Provider: Enid Braun DO    Staffing  Performed by: Enid Braun DO  Authorized by: Iglesia Guadarrama MD    Anesthesiologist was present at the time of the procedure.    Preanesthetic Checklist  Completed: patient identified, IV checked, site marked, risks and benefits discussed, surgical consent, monitors and equipment checked, pre-op evaluation, timeout performed and anesthesia consent givenArterial  Skin Prep: chlorhexidine gluconate  Location: radial    Catheter Size: 20 G  Catheter placement by Ultrasound guidance. Heme positive aspiration all ports.   Vessel Caliber: medium, large, patent, compressibility normal  Needle advanced into vessel with real time Ultrasound guidance.Insertion Attempts: 1  Assessment  Dressing: secured with tape and tegaderm  Patient: Tolerated well

## 2024-08-30 ENCOUNTER — TELEPHONE (OUTPATIENT)
Dept: NEUROSURGERY | Facility: CLINIC | Age: 69
End: 2024-08-30
Payer: MEDICARE

## 2024-08-30 VITALS
HEIGHT: 74 IN | OXYGEN SATURATION: 97 % | TEMPERATURE: 98 F | DIASTOLIC BLOOD PRESSURE: 87 MMHG | WEIGHT: 266.13 LBS | HEART RATE: 81 BPM | SYSTOLIC BLOOD PRESSURE: 169 MMHG | BODY MASS INDEX: 34.15 KG/M2 | RESPIRATION RATE: 17 BRPM

## 2024-08-30 DIAGNOSIS — G56.42 COMPLEX REGIONAL PAIN SYNDROME TYPE 2 OF LEFT UPPER EXTREMITY: Primary | ICD-10-CM

## 2024-08-30 LAB
POCT GLUCOSE: 113 MG/DL (ref 70–110)
POCT GLUCOSE: 247 MG/DL (ref 70–110)

## 2024-08-30 PROCEDURE — 63600175 PHARM REV CODE 636 W HCPCS: Performed by: NEUROLOGICAL SURGERY

## 2024-08-30 PROCEDURE — 63600175 PHARM REV CODE 636 W HCPCS: Performed by: PHYSICIAN ASSISTANT

## 2024-08-30 PROCEDURE — 94761 N-INVAS EAR/PLS OXIMETRY MLT: CPT

## 2024-08-30 PROCEDURE — 99900035 HC TECH TIME PER 15 MIN (STAT)

## 2024-08-30 PROCEDURE — 94799 UNLISTED PULMONARY SVC/PX: CPT

## 2024-08-30 PROCEDURE — 25000003 PHARM REV CODE 250: Performed by: NEUROLOGICAL SURGERY

## 2024-08-30 RX ORDER — KETOROLAC TROMETHAMINE 10 MG/1
10 TABLET, FILM COATED ORAL EVERY 6 HOURS
Qty: 20 TABLET | Refills: 0 | Status: ON HOLD | OUTPATIENT
Start: 2024-08-30 | End: 2024-09-03 | Stop reason: HOSPADM

## 2024-08-30 RX ORDER — HYDROMORPHONE HYDROCHLORIDE 4 MG/1
4 TABLET ORAL EVERY 4 HOURS PRN
Qty: 42 TABLET | Refills: 0 | Status: SHIPPED | OUTPATIENT
Start: 2024-08-30 | End: 2024-08-30

## 2024-08-30 RX ORDER — METHOCARBAMOL 750 MG/1
750 TABLET, FILM COATED ORAL 3 TIMES DAILY PRN
Qty: 60 TABLET | Refills: 0 | Status: SHIPPED | OUTPATIENT
Start: 2024-08-30

## 2024-08-30 RX ORDER — KETOROLAC TROMETHAMINE 30 MG/ML
15 INJECTION, SOLUTION INTRAMUSCULAR; INTRAVENOUS ONCE
Status: COMPLETED | OUTPATIENT
Start: 2024-08-30 | End: 2024-08-30

## 2024-08-30 RX ORDER — CLINDAMYCIN HYDROCHLORIDE 150 MG/1
150 CAPSULE ORAL EVERY 6 HOURS
Qty: 16 CAPSULE | Refills: 0 | Status: ON HOLD | OUTPATIENT
Start: 2024-08-30 | End: 2024-09-03

## 2024-08-30 RX ORDER — HYDROMORPHONE HYDROCHLORIDE 2 MG/1
4 TABLET ORAL EVERY 4 HOURS PRN
Qty: 84 TABLET | Refills: 0 | Status: ON HOLD | OUTPATIENT
Start: 2024-08-30 | End: 2024-09-03

## 2024-08-30 RX ADMIN — METOPROLOL SUCCINATE 200 MG: 50 TABLET, EXTENDED RELEASE ORAL at 08:08

## 2024-08-30 RX ADMIN — HYDROMORPHONE HYDROCHLORIDE 1 MG: 1 INJECTION, SOLUTION INTRAMUSCULAR; INTRAVENOUS; SUBCUTANEOUS at 05:08

## 2024-08-30 RX ADMIN — INSULIN GLARGINE 30 UNITS: 100 INJECTION, SOLUTION SUBCUTANEOUS at 08:08

## 2024-08-30 RX ADMIN — INSULIN ASPART 6 UNITS: 100 INJECTION, SOLUTION INTRAVENOUS; SUBCUTANEOUS at 05:08

## 2024-08-30 RX ADMIN — METHOCARBAMOL TABLETS 750 MG: 750 TABLET, COATED ORAL at 01:08

## 2024-08-30 RX ADMIN — NIFEDIPINE 30 MG: 30 TABLET, FILM COATED, EXTENDED RELEASE ORAL at 08:08

## 2024-08-30 RX ADMIN — GABAPENTIN 800 MG: 400 CAPSULE ORAL at 01:08

## 2024-08-30 RX ADMIN — GABAPENTIN 800 MG: 400 CAPSULE ORAL at 08:08

## 2024-08-30 RX ADMIN — MUPIROCIN: 20 OINTMENT TOPICAL at 08:08

## 2024-08-30 RX ADMIN — OXYCODONE HYDROCHLORIDE 10 MG: 5 TABLET ORAL at 02:08

## 2024-08-30 RX ADMIN — OXYCODONE HYDROCHLORIDE 10 MG: 5 TABLET ORAL at 08:08

## 2024-08-30 RX ADMIN — LEVOTHYROXINE SODIUM 88 MCG: 88 TABLET ORAL at 05:08

## 2024-08-30 RX ADMIN — INSULIN ASPART 20 UNITS: 100 INJECTION, SOLUTION INTRAVENOUS; SUBCUTANEOUS at 08:08

## 2024-08-30 RX ADMIN — HYDROMORPHONE HYDROCHLORIDE 1 MG: 1 INJECTION, SOLUTION INTRAMUSCULAR; INTRAVENOUS; SUBCUTANEOUS at 10:08

## 2024-08-30 RX ADMIN — TACROLIMUS 2 MG: 1 CAPSULE ORAL at 08:08

## 2024-08-30 RX ADMIN — CLINDAMYCIN HYDROCHLORIDE 150 MG: 150 CAPSULE ORAL at 05:08

## 2024-08-30 RX ADMIN — ACETAMINOPHEN 650 MG: 325 TABLET ORAL at 11:08

## 2024-08-30 RX ADMIN — CLINDAMYCIN HYDROCHLORIDE 150 MG: 150 CAPSULE ORAL at 11:08

## 2024-08-30 RX ADMIN — SODIUM CHLORIDE, POTASSIUM CHLORIDE, SODIUM LACTATE AND CALCIUM CHLORIDE: 600; 310; 30; 20 INJECTION, SOLUTION INTRAVENOUS at 05:08

## 2024-08-30 RX ADMIN — ACETAMINOPHEN 650 MG: 325 TABLET ORAL at 05:08

## 2024-08-30 RX ADMIN — HYDROMORPHONE HYDROCHLORIDE 1 MG: 1 INJECTION, SOLUTION INTRAMUSCULAR; INTRAVENOUS; SUBCUTANEOUS at 01:08

## 2024-08-30 RX ADMIN — KETOROLAC TROMETHAMINE 15 MG: 30 INJECTION, SOLUTION INTRAMUSCULAR at 01:08

## 2024-08-30 RX ADMIN — INSULIN ASPART 20 UNITS: 100 INJECTION, SOLUTION INTRAVENOUS; SUBCUTANEOUS at 11:08

## 2024-08-30 RX ADMIN — METHOCARBAMOL TABLETS 750 MG: 750 TABLET, COATED ORAL at 08:08

## 2024-08-30 NOTE — NURSING
Assumed care of patient from TONY Adame, patient is AAOX4, room air, vitals WNL, c/o 7/10 pain in STACEY, PRN meds given, C4-7 spinal cord stimulator sx completed today 8/29, bandages with moderate serosanguineous fluid, not able to change dressing until doc sees it after first day after procedure, Q4 neurovascular checks, refusing SCD's, CHG bath at 0500 daily, HOB 30 degrees, swallows pills whole, SBAX1 to bathroom, all safety precautions are in place, call bell and tray table are within reach of the patient and no additional questions or concerns from the patient at this time.

## 2024-08-30 NOTE — PLAN OF CARE
[place] - Med Surg  Discharge Final Note    Primary Care Provider: Emanuel Lopez MD    Expected Discharge Date: 8/30/2024    Pharmacist will go over home medications and reasons for medications. VN and bedside nurse to reiterate final discharge instructions.     Cleared from CM . Bedside Nurse and VN notified.    DC plan as listed below in CM flowsheet.    Wife to  at DC.    Final Discharge Note (most recent)       Final Note - 08/30/24 1342          Final Note    Assessment Type Final Discharge Note     Anticipated Discharge Disposition Home or Self Care     Hospital Resources/Appts/Education Provided Appointments scheduled and added to [facility]     Hospital Follow Up  Appt(s) scheduled? Yes     Any social issues identified prior to discharge? No     Did you assess the readiness or willingness of the family or caregiver to support self management of care? Yes     Schedule Hospital follow up within other (comments) (P)    NSX F/U 9/17, Sooner requested. PCP requested for within the week.       Post-Acute Status    Post-Acute Authorization Other     Other Status No Post-Acute Service Needs     Discharge Delays None known at this time                   Future Appointments   Date Time Provider Department Center   9/3/2024 11:45 AM LAB, [facility] [place]   9/17/2024  9:00 AM [facility] ODC XR-A LIMIT 350 LBS [facility] XRAY OP [place] [facility]   9/17/2024 10:00 AM Paige Gilbert PA-C [facility] NEUROSU [facility]   10/17/2024 12:30 PM [facility] ODC XR-A LIMIT 350 LBS [facility] XRAY OP [facility]   10/17/2024  1:30 PM Paige Gilbert PA-C [facility] NEUROSU [place] [facility]   10/24/2024  9:20 AM Jenna Fields NP [facility] CHANTELL [place] [facility]   11/13/2024 10:15 AM [facility] OIC-US1 MASTER [facility] ULTR [facility]   12/9/2024  9:00 AM [facility] ODC XR-A LIMIT 350 LBS [facility] XRAY OP [facility]   12/9/2024  9:30 AM Khadar Payne MD [facility] NEUROSU [place] [facility]   2/24/2025  3:00 PM Emanuel Lopez MD [facility] FAM MED [facility]          Contact Info       Emanuel Lopez MD   Specialty: Family Medicine   Relationship: PCP - General    [address]  [address]  [facility] [address]   Phone: [phone]       Next Steps: Go in 1 week(s)    Instructions: FOLLOW UP WITH PCP    Khadar Payne MD   Specialty: Neurosurgery    [address]  [address]  [facility] [address]   Phone: [phone]       Next Steps: Go in 1 week(s)    Instructions: FOLLOW UP WITH NEUROSURGERY          No orders of the defined types were placed in this encounter.

## 2024-08-30 NOTE — DISCHARGE INSTRUCTIONS
Keep incisions and surgery sites clean and dry.  Do not get wet.    Avoid increased range of motion of the neck.    HERMELINDO Iraheta, PA-C  Neurosurgery  [facility]  08/30/2024

## 2024-08-30 NOTE — PROGRESS NOTES
[facility]  Neurosurgery  Progress Note    Subjective:     Interval History: Severe neck pain.  Left hand moving better.  Eating and drinking.  Has gotten up on his own.  Patient is having good left arm pain relief.  He will have generator placement in 4 days.    History of Present Illness:      01/29/24  This is a very pleasant 68 y.o. male, liver transplant, chronic kidney disease stage 3, type 2 diabetes not well-controlled, status post C4-7 anterior cervical fusion for cervical spondylosis with myelopathy and radiculopathy performed by Dr. Christian on 08/03/2022.  Patient reports left arm pain and hand pain in the C8 distribution becoming progressively worse.  The pain was present before his spine surgery, did not improve after surgery and now has become worse.  His chronic gait imbalance has remained stable.  His bilateral hand numbness and hand weakness have remained stable.  He underwent a left C7-T1 transforaminal epidural steroid injection the pain management in May 2023 with no significant pain relief         INTERIM HISTORY:  03/06/24     Completed a CT and MRI of the cervical spine which did not show significant cord compression.  He has persistent severe foraminal stenosis at C4-5, C5-6, C6-7.  There is no significant foraminal stenosis at C3-4.  The patient complains of persistent left arm pain radiating down all his fingers and thumb on the left side.  Pain is associated with numbness.  He has difficulty using his left arm.  He reports some weakness.  His gait imbalance has remained quite stable.  The pain is affecting his quality of life and functional status.  Pain is constant.  He denies any change of temperature, edema, trophic changes.      Post-Op Info:  Procedure(s) (LRB):  LAMINECTOMY, SPINE, FOR NEUROSTIMULATOR ELECTRODE INSERTION (N/A)   1 Day Post-Op      Medications:  Continuous Infusions:  Scheduled Meds:   acetaminophen  650 mg Oral Q6H    clindamycin  150 mg Oral Q6H    gabapentin   800 mg Oral TID    insulin aspart U-100  20 Units Subcutaneous TIDWM    insulin glargine U-100 (Lantus)  30 Units Subcutaneous Daily    levothyroxine  88 mcg Oral Before breakfast    methocarbamoL  750 mg Oral TID    metoprolol succinate  200 mg Oral Daily    mupirocin   Nasal BID    NIFEdipine  30 mg Oral Daily    tacrolimus  2 mg Oral Daily AM    And    tacrolimus  1 mg Oral Daily PM    traZODone  50 mg Oral QHS     PRN Meds:  Current Facility-Administered Medications:     aluminum-magnesium hydroxide-simethicone, 30 mL, Oral, Q4H PRN    dextrose 10%, 12.5 g, Intravenous, PRN    dextrose 10%, 25 g, Intravenous, PRN    glucagon (human recombinant), 1 mg, Intramuscular, PRN    glucose, 16 g, Oral, PRN    glucose, 24 g, Oral, PRN    HYDROmorphone, 1 mg, Subcutaneous, Q3H PRN    insulin aspart U-100, 0-15 Units, Subcutaneous, QID (AC + HS) PRN    ondansetron, 8 mg, Oral, Q6H PRN    oxyCODONE, 10 mg, Oral, Q6H PRN    prochlorperazine, 5 mg, Intravenous, Q6H PRN    senna-docusate 8.6-50 mg, 2 tablet, Oral, Nightly PRN    traMADoL, 50 mg, Oral, Q6H PRN     Review of Systems  Objective:     Weight: 120.7 kg (266 lb 1.5 oz)  Body mass index is 34.16 kg/m².  Vital Signs (Most Recent):  Temp: 98.1 °F (36.7 °C) (08/30/24 1111)  Pulse: 84 (08/30/24 1111)  Resp: 17 (08/30/24 1040)  BP: (!) 169/87 (08/30/24 1111)  SpO2: (!) 94 % (08/30/24 1111) Vital Signs (24h Range):  Temp:  [98 °F (36.7 °C)-99.3 °F (37.4 °C)] 98.1 °F (36.7 °C)  Pulse:  [] 84  Resp:  [11-20] 17  SpO2:  [94 %-99 %] 94 %  BP: (128-201)/() 169/87     Date 08/30/24 0700 - 08/31/24 0659   Shift 3605-0823 9687-7195 8611-0530 24 Hour Total   INTAKE   Shift Total(mL/kg)       OUTPUT   Urine(mL/kg/hr) 300   300   Shift Total(mL/kg) 300(2.5)   300(2.5)   Weight (kg) 120.7 120.7 120.7 120.7                            Neurosurgery Physical Exam    General: well developed, well nourished, no distress  Mental Status: Awake, Alert, Oriented X3.Thought content  "appropriate  GCS: Motor: 6/Verbal: 5/Eyes: 4 GCS Total: 15    Motor Strength:  Strength  Deltoids Triceps Biceps Wrist Extension Wrist Flexion Hand    Upper: R 5/5 4/5 5/5 5/5 5/5 5/5    L 5/5 4/5 5/5 5/5 5/5 5/5     HF KF KE DF PF EHL   Lower: R 5/5 5/5 5/5 5/5 5/5 5/5    L 5/5 5/5 5/5 5/5 5/5 5/5       Castro: present B/L  Clonus: absent B/L  Incision- CDI        Significant Labs:  No results for input(s): "GLU", "NA", "K", "CL", "CO2", "BUN", "CREATININE", "CALCIUM", "MG" in the last 48 hours.  No results for input(s): "WBC", "HGB", "HCT", "PLT" in the last 48 hours.  No results for input(s): "LABPT", "INR", "APTT" in the last 48 hours.  Microbiology Results (last 7 days)       ** No results found for the last 168 hours. **              Assessment/Plan:     Active Diagnoses:    Diagnosis Date Noted POA    PRINCIPAL PROBLEM:  Chronic pain syndrome [G89.4] 07/13/2011 Yes    Complex regional pain syndrome type 2 of left upper extremity [G56.42] 07/12/2024 Yes    CKD (chronic kidney disease), stage III [N18.30]  Yes     Chronic    Type 2 diabetes mellitus with diabetic polyneuropathy, with long-term current use of insulin [E11.42, Z79.4] 10/04/2016 Not Applicable     Chronic      Problems Resolved During this Admission:     A/P:  POD #1 Occipital to C2 laminectomy     --Neurologically stable          -q4h neuro checks  --Imaging: Post op xrays pending  --Pain control  --DVT ppx: TEDs/SCDs/SQH  --Activity: PT/OT, OOB. C-collar/LSO brace  --Diet: Diabetic, Saline Lock IV  --Bowel regimen: PRN suppository, senna PRN  --Urinary: Voiding spontaneously  --Atelectasis ppx: Encourage IS hourly     Dispo: DC home today after cervical xrays are done    All of the above discussed and reviewed with Dr. Payne.    Paige Gilbert PA-C  Neurosurgery  [facility]    "

## 2024-08-30 NOTE — DISCHARGE SUMMARY
[facility]  Neurosurgery  Discharge Summary      Patient Name: Vick Gonzalez  MRN: [id number]  Admission Date: 8/29/2024  Hospital Length of Stay: 0 days  Discharge Date and Time: 8/30/2024  3:30 PM  Attending Physician: Khadar Payne MD  Discharging Provider: Paige Gilbert PA-C  Primary Care Provider: Emanuel Lopez MD     HPI:   This was deemed to be a more complex procedure due to the need of placing some extension for this paddle lead trial, also the complex anatomy of the occipital to C2 region require at least 1 hour more for the dissection and laminectomy portion of the surgery.     Indication     01/29/24  This is a very pleasant 68 y.o. male, liver transplant, chronic kidney disease stage 3, type 2 diabetes not well-controlled, status post C4-7 anterior cervical fusion for cervical spondylosis with myelopathy and radiculopathy performed by Dr. Christian on 08/03/2022.  Patient reports left arm pain and hand pain in the C8 distribution becoming progressively worse.  The pain was present before his spine surgery, did not improve after surgery and now has become worse.  His chronic gait imbalance has remained stable.  His bilateral hand numbness and hand weakness have remained stable.  He underwent a left C7-T1 transforaminal epidural steroid injection the pain management in May 2023 with no significant pain relief         INTERIM HISTORY:  03/06/24     Completed a CT and MRI of the cervical spine which did not show significant cord compression.  He has persistent severe foraminal stenosis at C4-5, C5-6, C6-7.  There is no significant foraminal stenosis at C3-4.  The patient complains of persistent left arm pain radiating down all his fingers and thumb on the left side.  Pain is associated with numbness.  He has difficulty using his left arm.  He reports some weakness.  His gait imbalance has remained quite stable.  The pain is affecting his quality of life and functional status.  Pain is  constant.  He denies any change of temperature, edema, trophic changes.      Procedure(s) (LRB):  LAMINECTOMY, SPINE, FOR NEUROSTIMULATOR ELECTRODE INSERTION (N/A)     Hospital Course:     Patient had the above-named procedure.  He had a lot of neck pain post op.  His left arm pain was improved and he had more function in his left hand.  He was walking.  Eating.  Voiding.  X-rays show good paddle lead placement.  He was discharged home on postop day 1 in stable condition.  He will have permanent spinal cord stimulator implanted in 4 days.        Pending Diagnostic Studies:       None          Final Active Diagnoses:    Diagnosis Date Noted POA    PRINCIPAL PROBLEM:  Chronic pain syndrome [G89.4] 07/13/2011 Yes    Complex regional pain syndrome type 2 of left upper extremity [G56.42] 07/12/2024 Yes    CKD (chronic kidney disease), stage III [N18.30]  Yes     Chronic    Type 2 diabetes mellitus with diabetic polyneuropathy, with long-term current use of insulin [E11.42, Z79.4] 10/04/2016 Not Applicable     Chronic      Problems Resolved During this Admission:      Discharged Condition: good    Disposition: Home or Self Care    Follow Up:   Follow-up Information       Emnauel Lopez MD. Go in 1 week(s).    Specialty: Family Medicine  Why: FOLLOW UP WITH PCP  Contact information:  [address]  [address]  [place] [id number] [phone]               Khadar Payne MD. Go in 1 week(s).    Specialty: Neurosurgery  Why: FOLLOW UP WITH NEUROSURGERY  Contact information:  [address]  [address]  [facility] [id number] [phone]                           Patient Instructions:   No discharge procedures on file.  Medications:  Reconciled Home Medications:      Medication List        START taking these medications      clindamycin 150 MG capsule  Commonly known as: CLEOCIN  Take 1 capsule (150 mg total) by mouth every 6 (six) hours. for 4 days     HYDROmorphone 2 MG tablet  Commonly known as: DILAUDID  Take 2  tablets (4 mg total) by mouth every 4 (four) hours as needed for Pain.     ketorolac 10 mg tablet  Commonly known as: TORADOL  Take 1 tablet (10 mg total) by mouth every 6 (six) hours. for 5 days     methocarbamoL 750 MG Tab  Commonly known as: ROBAXIN  Take 1 tablet (750 mg total) by mouth 3 (three) times daily as needed (muscle spasms).            CHANGE how you take these medications      aluminum-magnesium hydroxide-simethicone 200-200-20 mg/5 mL Susp  Commonly known as: MAALOX  Take 30 mLs by mouth 4 (four) times daily before meals and nightly.  What changed:   when to take this  reasons to take this     NIFEdipine 30 MG (OSM) 24 hr tablet  Commonly known as: PROCARDIA-XL  TAKE 1 TABLET BY MOUTH EVERY DAY  What changed: Another medication with the same name was removed. Continue taking this medication, and follow the directions you see here.     papaverine 30 mg/mL injection  Tri-Mix - PGE (alprostadil) 10mcg, papavarine 30mg, phentolamine 1mg; dispense 5mL vial, typical starting is 0.2 mL which is equal to 20 units  What changed:   when to take this  reasons to take this            CONTINUE taking these medications      allopurinoL 100 MG tablet  Commonly known as: ZYLOPRIM  TAKE 1 TABLET BY MOUTH TWICE A DAY     atorvastatin 40 MG tablet  Commonly known as: LIPITOR  Take 1 tablet (40 mg total) by mouth once daily.     blood-glucose meter Misc  1 Device by Misc.(Non-Drug; Combo Route) route 3 (three) times daily.     calcium carbonate-vitamin D3 600 mg-10 mcg (400 unit) Chew  Commonly known as: CALCIUM 600 WITH VITAMIN D3  Take 1 tablet by mouth once daily.     cyanocobalamin 100 MCG tablet  Commonly known as: VITAMIN B-12  Take 100 mcg by mouth once daily.     DEXCOM G6 TRANSMITTER Viviane  Generic drug: blood-glucose transmitter  Use as directed     DEXCOM G7  Misc  Generic drug: blood-glucose meter,continuous  Use as directed.     DEXCOM G7 SENSOR Viviane  Generic drug: blood-glucose sensor  Use as  "directed.     diphenhydrAMINE 25 mg capsule  Commonly known as: BENADRYL  Take 25 mg by mouth daily as needed for Allergies (Cold).     ENTRESTO 49-51 mg per tablet  Generic drug: sacubitriL-valsartan  Take 1 tablet by mouth 2 (two) times daily.     furosemide 20 MG tablet  Commonly known as: LASIX  Take 2 tablets (40 mg total) by mouth daily as needed (For Weight Gain > 2-3 lbs in 1 day or 4-6 lbs over 1 week notify PCP and take 40 mg daily for three days).     gabapentin 800 MG tablet  Commonly known as: NEURONTIN  TAKE 1 TABLET BY MOUTH THREE TIMES A DAY     insulin lispro 100 unit/mL pen  Commonly known as: HumaLOG KwikPen Insulin  Inject 20 Units into the skin 3 (three) times daily with meals.     lancets 30 gauge Misc  1 lancet by Misc.(Non-Drug; Combo Route) route 4 (four) times daily before meals and nightly.     levothyroxine 88 MCG tablet  Commonly known as: SYNTHROID  TAKE 1 TABLET BY MOUTH EVERY DAY     lisinopriL 20 MG tablet  Commonly known as: PRINIVIL,ZESTRIL  Take 20 mg by mouth.     MEN'S MULTIVITAMIN GUMMIES 200 mcg Chew  Generic drug: multivit with min-folic acid  Take 1 tablet by mouth once daily.     metoprolol succinate 200 MG 24 hr tablet  Commonly known as: TOPROL-XL  Take 1 tablet (200 mg total) by mouth once daily.     MOUNJARO 2.5 mg/0.5 mL Pnij  Generic drug: tirzepatide  Inject 2.5 mg into the skin every 7 days.     NOVOFINE PLUS 32 gauge x 1/6" Ndle  Generic drug: pen needle, diabetic     * sildenafiL 50 MG tablet  Commonly known as: VIAGRA  Take 1 tablet (50 mg total) by mouth daily as needed for Erectile Dysfunction.     * sildenafiL 100 MG tablet  Commonly known as: VIAGRA  Take 1 tablet (100 mg total) by mouth daily as needed for Erectile Dysfunction.     tacrolimus 1 MG Cap  Commonly known as: PROGRAF  Take 2 capsules (2 mg total) by mouth every morning AND 1 capsule (1 mg total) every evening.     TOUJEO MAX U-300 SOLOSTAR 300 unit/mL (3 mL) insulin pen  Generic drug: insulin " glargine U-300 conc  Inject 40 Units into the skin once daily.     traZODone 50 MG tablet  Commonly known as: DESYREL  TAKE 1 TABLET BY MOUTH EVERY DAY IN THE EVENING     TRUE METRIX GLUCOSE TEST STRIP Strp  Generic drug: blood sugar diagnostic  USE 3 TIMES DAILY TO TEST BLOOD GLUCOSE LEVEL           * This list has 2 medication(s) that are the same as other medications prescribed for you. Read the directions carefully, and ask your doctor or other care provider to review them with you.                STOP taking these medications      albuterol 90 mcg/actuation inhaler  Commonly known as: PROAIR HFA     aspirin 81 MG Chew     clopidogreL 75 mg tablet  Commonly known as: PLAVIX     traMADoL 50 mg tablet  Commonly known as: AQUILINO Gilbert PA-C  Neurosurgery  [place] - Med Surg

## 2024-08-30 NOTE — PLAN OF CARE
[facility]  Discharge Assessment    Primary Care Provider: Emanuel Lopez MD     Pt oriented. DCA done at bedside with patient. Demographics/Ins/NextofKin/PCP verified with patient/family and updated as needed. Denies any DME needs at present from pt/family. Patient/family plans to return home with family. Educated on bedside RX. Patient consents for [place] to discuss discharge plan with next of kin. Preferences appointment times and location obtained. Patient reports they will have transportation home upon discharge.    Wife to transport home at DC. Uses cane at home.      Discharge Assessment (most recent)       BRIEF DISCHARGE ASSESSMENT - 08/30/24 1340          Discharge Planning    Assessment Type Discharge Planning Brief Assessment (P)      Resource/Environmental Concerns none (P)      Support Systems Spouse/significant other (P)      Equipment Currently Used at Home cane, straight (P)      Current Living Arrangements home (P)      Patient/Family Anticipates Transition to home;home with family (P)      Patient/Family Anticipated Services at Transition none (P)      DME Needed Upon Discharge  none (P)      Discharge Plan A Home with family (P)         Physical Activity    On average, how many days per week do you engage in moderate to strenuous exercise (like a brisk walk)? 7 days (P)      On average, how many minutes do you engage in exercise at this level? 30 min (P)         Financial Resource Strain    How hard is it for you to pay for the very basics like food, housing, medical care, and heating? Not hard at all (P)         Housing Stability    In the last 12 months, was there a time when you were not able to pay the mortgage or rent on time? No (P)      At any time in the past 12 months, were you homeless or living in a shelter (including now)? No (P)         Transportation Needs    Has the lack of transportation kept you from medical appointments, meetings, work or from getting things needed for  daily living? No (P)         Food Insecurity    Within the past 12 months, you worried that your food would run out before you got the money to buy more. Never true (P)      Within the past 12 months, the food you bought just didn't last and you didn't have money to get more. Never true (P)         Stress    Do you feel stress - tense, restless, nervous, or anxious, or unable to sleep at night because your mind is troubled all the time - these days? Not at all (P)         Social Isolation    How often do you feel lonely or isolated from those around you?  Never (P)         Alcohol Use    Q1: How often do you have a drink containing alcohol? Never (P)         Utilities    In the past 12 months has the electric, gas, oil, or water company threatened to shut off services in your home? No (P)         Health Literacy    How often do you need to have someone help you when you read instructions, pamphlets, or other written material from your doctor or pharmacy? Never (P)                      Future Appointments   Date Time Provider Department Center   8/30/2024  1:45 PM [facility] PORTXR1 [facility] XRAY IP [facility]   9/3/2024 11:45 AM LAB, [facility] [place]   9/17/2024  9:00 AM [facility] ODC XR-A LIMIT 350 LBS [facility] XRAY OP [place] [facility]   9/17/2024 10:00 AM Paige Gilbert PA-C [facility] NEUROSU [place] [facility]   10/17/2024 12:30 PM [facility] ODC XR-A LIMIT 350 LBS [facility] XRAY OP [place] [facility]   10/17/2024  1:30 PM Paige Gilbert PA-CAITLYN [facility] NEUROSU [place] [facility]   10/24/2024  9:20 AM Jenna Fields NP [facility] CHANTELL [facility]   11/13/2024 10:15 AM [facility] OIC-US1 MASTER [facility] ULTR IC [facility]   2/24/2025  3:00 PM Emanuel Lopez MD [facility] FAM MED [facility]       No orders of the defined types were placed in this encounter.

## 2024-08-30 NOTE — PROGRESS NOTES
Discharge orders noted. Additional clinical references attached. Patient's discharge instructions given by bedside RN. Virtual nurse cued into room and reviewed discharge instructions. Education provided on new medication, diagnosis, and follow-up appointments. Teach back method used. Patient verbalized understanding. All questions answered. Transport to [facility] requested. Bedside nurse updated on patient status and transportation request.      08/30/24 1517   AVS Confirmation   Discharge instructions and AVS provided to and reviewed with patient and/or significant other. Yes

## 2024-08-30 NOTE — NURSING
Patient AAOx4, NAD noted, VSS.  Bedside delivery of medications complete.  IV removed. Dressing to neck and back done. AVS given to patient.  VN to review. Safety maintained.

## 2024-09-03 ENCOUNTER — ANESTHESIA EVENT (OUTPATIENT)
Dept: SURGERY | Facility: HOSPITAL | Age: 69
End: 2024-09-03
Payer: MEDICARE

## 2024-09-03 ENCOUNTER — ANESTHESIA (OUTPATIENT)
Dept: SURGERY | Facility: HOSPITAL | Age: 69
End: 2024-09-03
Payer: MEDICARE

## 2024-09-03 ENCOUNTER — HOSPITAL ENCOUNTER (OUTPATIENT)
Facility: HOSPITAL | Age: 69
Discharge: HOME | End: 2024-09-03
Attending: NEUROLOGICAL SURGERY | Admitting: NEUROLOGICAL SURGERY
Payer: MEDICARE

## 2024-09-03 DIAGNOSIS — G89.4 CHRONIC PAIN SYNDROME: Primary | ICD-10-CM

## 2024-09-03 DIAGNOSIS — G56.42 COMPLEX REGIONAL PAIN SYNDROME TYPE 2 OF LEFT UPPER EXTREMITY: ICD-10-CM

## 2024-09-03 LAB — POCT GLUCOSE: 159 MG/DL (ref 70–110)

## 2024-09-03 PROCEDURE — 63600175 PHARM REV CODE 636 W HCPCS

## 2024-09-03 PROCEDURE — C1820 GENERATOR NEURO RECHG BAT SY: HCPCS | Performed by: NEUROLOGICAL SURGERY

## 2024-09-03 PROCEDURE — 25000003 PHARM REV CODE 250

## 2024-09-03 PROCEDURE — 71000033 HC RECOVERY, INTIAL HOUR: Performed by: NEUROLOGICAL SURGERY

## 2024-09-03 PROCEDURE — 25000003 PHARM REV CODE 250: Performed by: NEUROLOGICAL SURGERY

## 2024-09-03 PROCEDURE — 71000015 HC POSTOP RECOV 1ST HR: Performed by: NEUROLOGICAL SURGERY

## 2024-09-03 PROCEDURE — 36000706: Performed by: NEUROLOGICAL SURGERY

## 2024-09-03 PROCEDURE — 71000016 HC POSTOP RECOV ADDL HR: Performed by: NEUROLOGICAL SURGERY

## 2024-09-03 PROCEDURE — 63685 INS/RPLC SPI NPG/RCVR POCKET: CPT | Mod: 58,,, | Performed by: NEUROLOGICAL SURGERY

## 2024-09-03 PROCEDURE — 25000003 PHARM REV CODE 250: Performed by: STUDENT IN AN ORGANIZED HEALTH CARE EDUCATION/TRAINING PROGRAM

## 2024-09-03 PROCEDURE — 63600175 PHARM REV CODE 636 W HCPCS: Performed by: STUDENT IN AN ORGANIZED HEALTH CARE EDUCATION/TRAINING PROGRAM

## 2024-09-03 PROCEDURE — 37000009 HC ANESTHESIA EA ADD 15 MINS: Performed by: NEUROLOGICAL SURGERY

## 2024-09-03 PROCEDURE — 36000707: Performed by: NEUROLOGICAL SURGERY

## 2024-09-03 PROCEDURE — 37000008 HC ANESTHESIA 1ST 15 MINUTES: Performed by: NEUROLOGICAL SURGERY

## 2024-09-03 RX ORDER — ROCURONIUM BROMIDE 10 MG/ML
INJECTION, SOLUTION INTRAVENOUS
Status: DISCONTINUED | OUTPATIENT
Start: 2024-09-03 | End: 2024-09-03

## 2024-09-03 RX ORDER — LIDOCAINE HYDROCHLORIDE AND EPINEPHRINE 10; 10 MG/ML; UG/ML
INJECTION, SOLUTION INFILTRATION; PERINEURAL
Status: DISCONTINUED | OUTPATIENT
Start: 2024-09-03 | End: 2024-09-03 | Stop reason: HOSPADM

## 2024-09-03 RX ORDER — OXYCODONE HCL 10 MG/1
10 TABLET, FILM COATED, EXTENDED RELEASE ORAL
Status: COMPLETED | OUTPATIENT
Start: 2024-09-03 | End: 2024-09-03

## 2024-09-03 RX ORDER — FENTANYL CITRATE 50 UG/ML
INJECTION, SOLUTION INTRAMUSCULAR; INTRAVENOUS
Status: DISCONTINUED | OUTPATIENT
Start: 2024-09-03 | End: 2024-09-03

## 2024-09-03 RX ORDER — ONDANSETRON HYDROCHLORIDE 2 MG/ML
INJECTION, SOLUTION INTRAVENOUS
Status: DISCONTINUED | OUTPATIENT
Start: 2024-09-03 | End: 2024-09-03

## 2024-09-03 RX ORDER — PROPOFOL 10 MG/ML
VIAL (ML) INTRAVENOUS
Status: DISCONTINUED | OUTPATIENT
Start: 2024-09-03 | End: 2024-09-03

## 2024-09-03 RX ORDER — ACETAMINOPHEN 325 MG/1
650 TABLET ORAL
Status: COMPLETED | OUTPATIENT
Start: 2024-09-03 | End: 2024-09-03

## 2024-09-03 RX ORDER — DIAZEPAM 5 MG/1
5 TABLET ORAL EVERY 6 HOURS PRN
Status: DISCONTINUED | OUTPATIENT
Start: 2024-09-03 | End: 2024-09-03 | Stop reason: HOSPADM

## 2024-09-03 RX ORDER — DEXMEDETOMIDINE HYDROCHLORIDE 100 UG/ML
INJECTION, SOLUTION INTRAVENOUS
Status: DISCONTINUED | OUTPATIENT
Start: 2024-09-03 | End: 2024-09-03

## 2024-09-03 RX ORDER — OXYCODONE HYDROCHLORIDE 5 MG/1
5 TABLET ORAL
Status: DISCONTINUED | OUTPATIENT
Start: 2024-09-03 | End: 2024-09-03 | Stop reason: HOSPADM

## 2024-09-03 RX ORDER — PHENYLEPHRINE HYDROCHLORIDE 10 MG/ML
INJECTION INTRAVENOUS
Status: DISCONTINUED | OUTPATIENT
Start: 2024-09-03 | End: 2024-09-03

## 2024-09-03 RX ORDER — PREGABALIN 75 MG/1
75 CAPSULE ORAL
Status: COMPLETED | OUTPATIENT
Start: 2024-09-03 | End: 2024-09-03

## 2024-09-03 RX ORDER — HYDROMORPHONE HYDROCHLORIDE 4 MG/1
4 TABLET ORAL EVERY 4 HOURS PRN
Qty: 36 TABLET | Refills: 0 | Status: SHIPPED | OUTPATIENT
Start: 2024-09-03 | End: 2024-09-17

## 2024-09-03 RX ORDER — OXYCODONE AND ACETAMINOPHEN 5; 325 MG/1; MG/1
2 TABLET ORAL EVERY 4 HOURS PRN
Status: DISCONTINUED | OUTPATIENT
Start: 2024-09-03 | End: 2024-09-03 | Stop reason: HOSPADM

## 2024-09-03 RX ORDER — LIDOCAINE HYDROCHLORIDE 20 MG/ML
INJECTION, SOLUTION EPIDURAL; INFILTRATION; INTRACAUDAL; PERINEURAL
Status: DISCONTINUED | OUTPATIENT
Start: 2024-09-03 | End: 2024-09-03

## 2024-09-03 RX ORDER — CELECOXIB 100 MG/1
200 CAPSULE ORAL
Status: COMPLETED | OUTPATIENT
Start: 2024-09-03 | End: 2024-09-03

## 2024-09-03 RX ORDER — KETAMINE HCL IN 0.9 % NACL 50 MG/5 ML
SYRINGE (ML) INTRAVENOUS
Status: DISCONTINUED | OUTPATIENT
Start: 2024-09-03 | End: 2024-09-03

## 2024-09-03 RX ORDER — ONDANSETRON HYDROCHLORIDE 2 MG/ML
4 INJECTION, SOLUTION INTRAVENOUS EVERY 8 HOURS PRN
Status: DISCONTINUED | OUTPATIENT
Start: 2024-09-03 | End: 2024-09-03 | Stop reason: HOSPADM

## 2024-09-03 RX ORDER — CEFAZOLIN SODIUM 1 G/3ML
INJECTION, POWDER, FOR SOLUTION INTRAMUSCULAR; INTRAVENOUS
Status: DISCONTINUED | OUTPATIENT
Start: 2024-09-03 | End: 2024-09-03

## 2024-09-03 RX ORDER — DEXAMETHASONE SODIUM PHOSPHATE 4 MG/ML
INJECTION, SOLUTION INTRA-ARTICULAR; INTRALESIONAL; INTRAMUSCULAR; INTRAVENOUS; SOFT TISSUE
Status: DISCONTINUED | OUTPATIENT
Start: 2024-09-03 | End: 2024-09-03

## 2024-09-03 RX ORDER — HYDROMORPHONE HYDROCHLORIDE 2 MG/ML
0.5 INJECTION, SOLUTION INTRAMUSCULAR; INTRAVENOUS; SUBCUTANEOUS EVERY 5 MIN PRN
Status: DISCONTINUED | OUTPATIENT
Start: 2024-09-03 | End: 2024-09-03 | Stop reason: HOSPADM

## 2024-09-03 RX ORDER — CLINDAMYCIN HYDROCHLORIDE 150 MG/1
150 CAPSULE ORAL EVERY 6 HOURS
Qty: 12 CAPSULE | Refills: 0 | Status: SHIPPED | OUTPATIENT
Start: 2024-09-03 | End: 2024-09-07

## 2024-09-03 RX ORDER — ONDANSETRON HYDROCHLORIDE 2 MG/ML
4 INJECTION, SOLUTION INTRAVENOUS DAILY PRN
Status: DISCONTINUED | OUTPATIENT
Start: 2024-09-03 | End: 2024-09-03 | Stop reason: HOSPADM

## 2024-09-03 RX ORDER — OXYCODONE AND ACETAMINOPHEN 10; 325 MG/1; MG/1
2 TABLET ORAL EVERY 4 HOURS PRN
Status: DISCONTINUED | OUTPATIENT
Start: 2024-09-03 | End: 2024-09-03 | Stop reason: HOSPADM

## 2024-09-03 RX ADMIN — HYDROMORPHONE HYDROCHLORIDE 0.5 MG: 2 INJECTION INTRAMUSCULAR; INTRAVENOUS; SUBCUTANEOUS at 09:09

## 2024-09-03 RX ADMIN — Medication 25 MG: at 07:09

## 2024-09-03 RX ADMIN — SUGAMMADEX 200 MG: 100 INJECTION, SOLUTION INTRAVENOUS at 08:09

## 2024-09-03 RX ADMIN — ACETAMINOPHEN 650 MG: 325 TABLET ORAL at 06:09

## 2024-09-03 RX ADMIN — GLYCOPYRROLATE 0.1 MG: 0.2 INJECTION, SOLUTION INTRAMUSCULAR; INTRAVITREAL at 07:09

## 2024-09-03 RX ADMIN — PHENYLEPHRINE HYDROCHLORIDE 100 MCG: 10 INJECTION INTRAVENOUS at 07:09

## 2024-09-03 RX ADMIN — PHENYLEPHRINE HYDROCHLORIDE 200 MCG: 10 INJECTION INTRAVENOUS at 08:09

## 2024-09-03 RX ADMIN — ROCURONIUM BROMIDE 50 MG: 10 INJECTION, SOLUTION INTRAVENOUS at 07:09

## 2024-09-03 RX ADMIN — FENTANYL CITRATE 100 MCG: 50 INJECTION INTRAMUSCULAR; INTRAVENOUS at 07:09

## 2024-09-03 RX ADMIN — SODIUM CHLORIDE, SODIUM LACTATE, POTASSIUM CHLORIDE, AND CALCIUM CHLORIDE: .6; .31; .03; .02 INJECTION, SOLUTION INTRAVENOUS at 07:09

## 2024-09-03 RX ADMIN — PROPOFOL 200 MG: 10 INJECTION, EMULSION INTRAVENOUS at 07:09

## 2024-09-03 RX ADMIN — CEFAZOLIN 2 G: 330 INJECTION, POWDER, FOR SOLUTION INTRAMUSCULAR; INTRAVENOUS at 08:09

## 2024-09-03 RX ADMIN — OXYCODONE HYDROCHLORIDE 5 MG: 5 TABLET ORAL at 09:09

## 2024-09-03 RX ADMIN — Medication 15 MG: at 08:09

## 2024-09-03 RX ADMIN — DEXMEDETOMIDINE 10 MCG: 200 INJECTION, SOLUTION INTRAVENOUS at 08:09

## 2024-09-03 RX ADMIN — CELECOXIB 200 MG: 100 CAPSULE ORAL at 06:09

## 2024-09-03 RX ADMIN — ONDANSETRON 8 MG: 2 INJECTION, SOLUTION INTRAMUSCULAR; INTRAVENOUS at 08:09

## 2024-09-03 RX ADMIN — DEXAMETHASONE SODIUM PHOSPHATE 8 MG: 4 INJECTION, SOLUTION INTRA-ARTICULAR; INTRALESIONAL; INTRAMUSCULAR; INTRAVENOUS; SOFT TISSUE at 07:09

## 2024-09-03 RX ADMIN — GLYCOPYRROLATE 0.1 MG: 0.2 INJECTION, SOLUTION INTRAMUSCULAR; INTRAVITREAL at 08:09

## 2024-09-03 RX ADMIN — PREGABALIN 75 MG: 75 CAPSULE ORAL at 06:09

## 2024-09-03 RX ADMIN — LIDOCAINE HYDROCHLORIDE 100 MG: 20 INJECTION, SOLUTION EPIDURAL; INFILTRATION; INTRACAUDAL; PERINEURAL at 07:09

## 2024-09-03 RX ADMIN — OXYCODONE HYDROCHLORIDE 10 MG: 10 TABLET, FILM COATED, EXTENDED RELEASE ORAL at 06:09

## 2024-09-03 NOTE — DISCHARGE INSTRUCTIONS
Remove dressing on 09/06/24.  Allow steri-strips to fall off on their own.  Ok to shower on 09/06/24, but do not immerse wound in water.  Continue Clindamycin 150 mg 4 times daily until 09/07/24    ANESTHESIA  -For the first 24 hours after surgery:  Do not drive, use heavy equipment, make important decisions, or drink alcohol  -It is normal to feel sleepy for several hours.  Rest until you are more awake.  -Have someone stay with you, if needed.  They can watch for problems and help keep you safe.  -Some possible post anesthesia side effects include: nausea and vomiting, sore throat and hoarseness, sleepiness, and dizziness.    PAIN  -If you have pain after surgery, pain medicine will help you feel better.  Take it as directed, before pain becomes severe.  Most pain relievers taken by mouth need at least 20-30 minutes to start working.  -Do not drive or drink alcohol while taking pain medicine.  -Pain medication can upset your stomach.  Taking them with a little food may help.  -Other ways to help control pain: elevation, ice, and relaxation  -Call your surgeon if still having unmanageable pain an hour after taking pain medicine.  -Pain medicine can cause constipation.  Taking an over-the counter stool softener while on prescription pain medicine and drinking plenty of fluids can prevent this side effect.  -Call your surgeon if you have severe side effects like: breathing problems, trouble waking up, dizziness, confusion, or severe constipation.    NAUSEA  -Some people have nausea after surgery.  This is often because of anesthesia, pain, pain medicine, or the stress of surgery.  -Do not push yourself to eat.  Start off with clear liquids and soup.  Slowly move to solid foods.  Don't eat fatty, rich, spicy foods at first.  Eat smaller amounts.  -If you develop persistent nausea and vomiting please notify your surgeon immediately.    BLEEDING  -Different types of surgery require different types of care and dressing  changes.  It is important to follow all instructions and advice from your surgeon.  Change dressing as directed.  Call your surgeon for any concerns regarding postop bleeding.    SIGNS OF INFECTION  -Signs of infection include: fever, swelling, drainage, and redness  -Notify your surgeon if you have a fever of 100.4 F (38.0 C) or higher.  -Notify your surgeon if you notice redness, swelling, increased pain, pus, or a foul smell at the incision site.

## 2024-09-03 NOTE — H&P
"NEUROSURGICAL PROGRESS NOTE     DATE OF SERVICE:  09/30/24     ATTENDING PHYSICIAN:  Khadar Payne MD     SUBJECTIVE:  01/29/24  This is a very pleasant 68 y.o. male, liver transplant, chronic kidney disease stage 3, type 2 diabetes not well-controlled, status post C4-7 anterior cervical fusion for cervical spondylosis with myelopathy and radiculopathy performed by Dr. Christian on 08/03/2022.  Patient reports left arm pain and hand pain in the C8 distribution becoming progressively worse.  The pain was present before his spine surgery, did not improve after surgery and now has become worse.  His chronic gait imbalance has remained stable.  His bilateral hand numbness and hand weakness have remained stable.  He underwent a left C7-T1 transforaminal epidural steroid injection the pain management in May 2023 with no significant pain relief         INTERIM HISTORY:  03/06/24     Completed a CT and MRI of the cervical spine which did not show significant cord compression.  He has persistent severe foraminal stenosis at C4-5, C5-6, C6-7.  There is no significant foraminal stenosis at C3-4.  The patient complains of persistent left arm pain radiating down all his fingers and thumb on the left side.  Pain is associated with numbness.  He has difficulty using his left arm.  He reports some weakness.  His gait imbalance has remained quite stable.  The pain is affecting his quality of life and functional status.  Pain is constant.  He denies any change of temperature, edema, trophic changes.    09/03/24  He underwent a C2-3 paddle lead placement 5 days ago. He reports 100% pain relief in his left arm and hand since surgery. Only complains of incisional neck pain. Has remaining left hand "tightness" felling but overall he is very satisfied with the left hand pain relief. Reports also improved dexterity with his left hand.            PAST MEDICAL HISTORY:          Active Ambulatory Problems     Diagnosis Date Noted    Chronic pain " syndrome 07/13/2011    Acquired hypothyroidism      History of hepatitis C, s/p successful Rx w/ SVR24 (cure) - 5/2018 08/23/2011    Gout, unspecified 03/14/2011    Substance abuse 10/24/2010    Thrombocytopenia 06/16/2010    Anxiety      Lumbar radiculopathy 04/08/2015    Acquired spondylolisthesis 05/12/2015    Essential hypertension 06/19/2015    Type 2 diabetes mellitus with diabetic polyneuropathy, with long-term current use of insulin 10/04/2016    S/P liver transplant 10/04/2016    Hypertriglyceridemia 10/05/2016    CKD (chronic kidney disease), stage III      PAD (peripheral artery disease) 08/30/2017    Erectile dysfunction due to arterial insufficiency 10/09/2017    BPH with urinary obstruction 10/09/2017    Immunosuppressed status 10/16/2017    Low testosterone in male 07/25/2018    Hypertension associated with diabetes 12/01/2020    Hyperlipidemia associated with type 2 diabetes mellitus 12/01/2020    Erectile dysfunction associated with type 2 diabetes mellitus 12/01/2020    Claudication in peripheral vascular disease 12/01/2020    Aortic atherosclerosis 11/16/2010    Calcified granuloma of lung 10/22/2010    Carpal tunnel syndrome of left wrist 07/13/2021    Acute congestive heart failure 05/12/2023    Alcohol dependence, in remission 05/26/2023    Pulmonary emphysema, unspecified emphysema type 05/26/2023    Coronary artery disease involving native coronary artery of native heart with angina pectoris 08/24/2023    Epistaxis 12/04/2023              Resolved Ambulatory Problems     Diagnosis Date Noted    Abdominal pain, generalized 08/25/2011    Abdominal pain, right upper quadrant 09/16/2010    Abdominal tenderness, right upper quadrant 05/15/2011    Unspecified chronic liver disease without mention of alcohol      Hypertension      Acute alcoholic hepatitis 08/02/2010    Acute bronchitis 03/14/2011    Acute gouty arthropathy 08/28/2011    Aftercare following organ transplant 06/17/2010    Alcoholic  cirrhosis of liver 10/11/2010    Anemia of other chronic disease 07/15/2010    Cholangitis 07/15/2010    Chronic hepatitis C with hepatic coma 06/08/2011    Cirrhosis of liver without mention of alcohol 07/28/2011    Complications of transplanted liver 05/11/2011    Dietary surveillance and counseling 07/28/2011    Encephalopathy, unspecified 06/16/2010    Family history of diabetes mellitus 07/15/2010    Hematuria, unspecified 02/11/2012    Hepatomegaly 08/23/2011    Liver replaced by transplant 08/23/2011    Encounter for long-term (current) use of steroids 06/02/2011    Encounter for long-term (current) use of aspirin 01/16/2011    Microscopic hematuria 05/11/2011    Mononeuritis of unspecified site 07/15/2010    Need for prophylactic immunotherapy 06/02/2011    Nocturia 11/02/2010    Alcohol abuse, in remission 06/02/2010    Obstruction of bile duct 10/24/2010    Open wound of finger(s) , without mention of complication 07/12/2011    Other and unspecified alcohol dependence, unspecified drinking behavior 01/16/2011    Other ascites 06/16/2010    Other cells and casts in urine 11/02/2010    Other sequelae of chronic liver disease 07/28/2011    Other specified disorders of biliary tract 10/23/2010    Other specified disorders of liver 09/26/2010    Peptic ulcer, unspecified site, unspecified as acute or chronic, without mention of hemorrhage, perforation, or obstruction 10/20/2010    Personal history of other infectious and parasitic disease 11/15/2010    Portal hypertension 07/26/2010    Secondary diabetes mellitus without mention of complication, uncontrolled 10/24/2010    Unspecified disorder of male genital organs 11/02/2010    Genital herpes, unspecified 06/02/2011    Orchitis and epididymitis, unspecified 10/12/2010    Unspecified viral hepatitis C without hepatic coma 10/20/2010    Hepatitis C 12/10/2012    Genital herpes      Hyperpigmentation 10/21/2014    Diskitis 01/15/2015    Lower back pain 01/15/2015     Discitis of lumbosacral region 01/16/2015    Lumbar discitis 06/15/2015    Lumbar myelopathy 06/18/2015    Abnormal gait 07/09/2015    Muscle weakness 07/09/2015    LBP (low back pain) 07/09/2015    S/P lumbar spinal fusion 08/11/2015    Hyponatremia 10/04/2016    OSMANY (acute kidney injury) 10/04/2016    Volume depletion 02/28/2017    Ischemic leg 08/30/2017    Sacroiliitis 12/02/2019    Abdominal pain 01/23/2020    SBO (small bowel obstruction)      Sepsis 11/11/2020    Transaminitis 11/11/2020    Right lower quadrant abdominal pain 11/12/2020    Diarrhea 11/13/2020    Severe obesity (BMI 35.0-39.9) with comorbidity 12/01/2020    Diabetic polyneuropathy associated with type 2 diabetes mellitus 12/01/2020    Penetrating foot wound 10/11/2021    Diabetic foot infection 10/11/2021    Left hand weakness 02/01/2022    Fine motor impairment 02/14/2022    Loss of feeling or sensation 02/14/2022    Myeloradiculopathy 08/22/2022    Chronic neck pain with history of cervical spinal surgery 01/10/2023    Hypomagnesemia 05/13/2023    NSTEMI (non-ST elevated myocardial infarction) 08/02/2023              Past Medical History:   Diagnosis Date    Anemia      Cataract      Diabetes mellitus type II, uncontrolled      ED (erectile dysfunction)      Encounter for blood transfusion      Gout, arthritis      History of alcohol abuse      History of positive PPD, treatment status unknown      History of substance abuse      Hypothyroidism      Pancreatitis 2016    Peptic ulcer disease           PAST SURGICAL HISTORY:            Past Surgical History:   Procedure Laterality Date    ANTERIOR CERVICAL DISCECTOMY W/ FUSION N/A 8/22/2022     Procedure: Procedure: ACDF 4-7 LOS: 4.0 Anesthesia: General Blood: Type & Screen Radiology: C-Arm Microscope: ------- SNS: EMG, SEP, MEP Brace: Thornton Bed: Regular Bed, Shoulder Strap Headrest:------ Position: Supine Equipment: HackHands;  Surgeon: Titi Christian MD;  Location: [facility];  Service:  Neurosurgery;  Laterality: N/A;  Depuy  confirmed CW 8/19  Neuro monitoring confirmed CW 8/19    CARPAL TUNNEL RELEASE Left 10/29/2021     Procedure: RELEASE, CARPAL TUNNEL;  Surgeon: Jameson Alejo Jr., MD;  Location: [facility] OR;  Service: Orthopedics;  Laterality: Left;    CHOLECYSTECTOMY        CORONARY ANGIOGRAPHY N/A 8/2/2023     Procedure: ANGIOGRAM, CORONARY ARTERY;  Surgeon: Juan Vera MD;  Location: [facility] CATH LAB/EP;  Service: Cardiology;  Laterality: N/A;    DECOMPRESSION OF CERVICAL SPINE BY ANTERIOR APPROACH WITH FUSION   8/22/2022     Procedure: DECOMPRESSION AND FUSION, SPINE, CERVICAL, ANTERIOR APPROACH;  Surgeon: Titi Christian MD;  Location: [facility] OR;  Service: Neurosurgery;;    INJECTION OF JOINT Right 12/2/2019     Procedure: INJECTION, JOINT SI;  Surgeon: hTu Penn MD;  Location: [facility] PAIN MGT;  Service: Pain Management;  Laterality: Right;  RT SI JNT INJ    LEFT HEART CATHETERIZATION Left 8/2/2023     Procedure: Left heart cath;  Surgeon: Juan Vera MD;  Location: [facility] CATH LAB/EP;  Service: Cardiology;  Laterality: Left;    LIVER TRANSPLANT   06/2010    SPINE SURGERY        TRANSFORAMINAL EPIDURAL INJECTION OF STEROID Left 5/29/2023     Procedure: INJECTION, STEROID, EPIDURAL, TRANSFORAMINAL APPROACH,  LEFT C7-T1 DIRECT REF;  Surgeon: Thu Penn MD;  Location: [facility] PAIN MGT;  Service: Pain Management;  Laterality: Left;  4/3 MD ILL/PT WILL C/B         SOCIAL HISTORY:   Social History                Socioeconomic History    Marital status:    Tobacco Use    Smoking status: Former       Passive exposure: Never    Smokeless tobacco: Never   Substance and Sexual Activity    Alcohol use: No       Comment: over 5 years ago, none currently    Drug use: Not Currently       Comment: Former cocaine use    Sexual activity: Yes      Social Determinants of Health              Financial Resource Strain: Low Risk  (12/4/2023)     Overall Financial Resource Strain (CARDIA)       Difficulty of Paying Living Expenses: Not very hard   Food Insecurity: No Food Insecurity (12/4/2023)     Hunger Vital Sign      Worried About Running Out of Food in the Last Year: Never true      Ran Out of Food in the Last Year: Never true   Transportation Needs: No Transportation Needs (12/4/2023)     PRAPARE - Transportation      Lack of Transportation (Medical): No      Lack of Transportation (Non-Medical): No   Physical Activity: Sufficiently Active (12/4/2023)     Exercise Vital Sign      Days of Exercise per Week: 3 days      Minutes of Exercise per Session: 90 min   Stress: No Stress Concern Present (12/4/2023)     [employer] - Occupational Stress Questionnaire      Feeling of Stress : Only a little   Social Connections: Moderately Integrated (12/4/2023)     Social Connection and Isolation Panel [NHANES]      Frequency of Communication with Friends and Family: More than three times a week      Frequency of Social Gatherings with Friends and Family: More than three times a week      Attends Nondenominational Services: 1 to 4 times per year      Active Member of Clubs or Organizations: No      Attends Club or Organization Meetings: Never      Marital Status:    Housing Stability: Low Risk  (12/4/2023)     Housing Stability Vital Sign      Unable to Pay for Housing in the Last Year: No      Number of Places Lived in the Last Year: 1      Unstable Housing in the Last Year: No         FAMILY HISTORY:            Family History   Problem Relation Age of Onset    Cancer Mother      Diabetes Mother      Heart disease Mother      Diabetes Sister      Cancer Maternal Uncle 82         colon CA    Drug abuse Daughter      Melanoma Neg Hx      Psoriasis Neg Hx      Lupus Neg Hx      Eczema Neg Hx      Acne Neg Hx           CURRENTS MEDICATIONS:  Medications Ordered Prior to Encounter               Current Outpatient Medications on File Prior to Visit   Medication Sig Dispense Refill     albuterol (PROAIR HFA) 90 mcg/actuation inhaler Inhale 2 puffs into the lungs every 6 (six) hours as needed for Wheezing. Rescue 18 g 5    allopurinoL (ZYLOPRIM) 100 MG tablet TAKE 1 TABLET BY MOUTH TWICE A  tablet 3    aluminum-magnesium hydroxide-simethicone (MAALOX) 200-200-20 mg/5 mL Susp Take 30 mLs by mouth 4 (four) times daily before meals and nightly. (Patient taking differently: Take 30 mLs by mouth every 6 (six) hours as needed.) 769 mL 0    aspirin 81 MG Chew Take 1 tablet (81 mg total) by mouth once daily. 90 tablet 3    atorvastatin (LIPITOR) 40 MG tablet Take 1 tablet (40 mg total) by mouth once daily. 90 tablet 3    blood-glucose meter,continuous (DEXCOM G7 ) Misc Use as directed. 1 each 11    blood-glucose sensor (DEXCOM G7 SENSOR) Viviane Use as directed. 3 each 11    blood-glucose transmitter (DEXCOM G6 TRANSMITTER) Viviane Use as directed 1 each 11    calcium carbonate-vitamin D3 (CALCIUM 600 WITH VITAMIN D3) 600 mg(1,500mg) -400 unit Chew Take 1 tablet by mouth once daily.         clopidogreL (PLAVIX) 75 mg tablet Take 1 tablet (75 mg total) by mouth once daily. 30 tablet 11    cyanocobalamin (VITAMIN B-12) 100 MCG tablet Take 100 mcg by mouth once daily.        diphenhydrAMINE (BENADRYL) 25 mg capsule Take 25 mg by mouth daily as needed for Allergies (Cold).        gabapentin (NEURONTIN) 800 MG tablet TAKE 1 TABLET BY MOUTH THREE TIMES A DAY 90 tablet 11    insulin glargine U-300 conc (TOUJEO MAX U-300 SOLOSTAR) 300 unit/mL (3 mL) insulin pen Inject 40 Units into the skin once daily. 3 mL 11    insulin lispro (HUMALOG KWIKPEN INSULIN) 100 unit/mL pen Inject 20 Units into the skin 3 (three) times daily with meals. 60 mL 1    isosorbide-hydrALAZINE 20-37.5 mg (BIDIL) 20-37.5 mg Tab Take 1 tablet by mouth 3 (three) times daily. 90 tablet 11    lancets 30 gauge Misc 1 lancet by Misc.(Non-Drug; Combo Route) route 4 (four) times daily before meals and nightly. 200 each 11    levothyroxine  "(SYNTHROID) 88 MCG tablet TAKE 1 TABLET BY MOUTH EVERY DAY 90 tablet 1    lisinopriL (PRINIVIL,ZESTRIL) 20 MG tablet Take 20 mg by mouth.        metoprolol succinate (TOPROL-XL) 200 MG 24 hr tablet TAKE 1 TABLET BY MOUTH EVERY DAY 90 tablet 3    multivit with min-folic acid (MEN'S MULTIVITAMIN GUMMIES) 200 mcg Chew Take 1 tablet by mouth once daily.        NIFEdipine (PROCARDIA-XL) 30 MG (OSM) 24 hr tablet Take 30 mg by mouth.        NOVOFINE PLUS 32 gauge x 1/6" Ndle          papaverine 30 mg/mL injection Tri-Mix - PGE (alprostadil) 10mcg, papavarine 30mg, phentolamine 1mg; dispense 5mL vial, typical starting is 0.2 mL which is equal to 20 units (Patient taking differently: as needed. Tri-Mix - PGE (alprostadil) 10mcg, papavarine 30mg, phentolamine 1mg; dispense 5mL vial, typical starting is 0.2 mL which is equal to 20 units) 10 mL 5    sacubitriL-valsartan (ENTRESTO) 49-51 mg per tablet Take 1 tablet by mouth 2 (two) times daily. 60 tablet 3    tacrolimus (PROGRAF) 1 MG Cap Take 2 capsules (2 mg total) by mouth every morning AND 1 capsule (1 mg total) every evening. 90 capsule 11    tirzepatide (MOUNJARO) 2.5 mg/0.5 mL PnIj Inject 2.5 mg into the skin every 7 days. 4 Pen 11    traMADoL (ULTRAM) 50 mg tablet Take 1 tablet (50 mg total) by mouth every 6 (six) hours as needed for Pain (severe 7-10 pain only). 6 tablet 0    traZODone (DESYREL) 50 MG tablet TAKE 1 TABLET BY MOUTH EVERY DAY IN THE EVENING 90 tablet 3    TRUE METRIX GLUCOSE TEST STRIP Strp USE 3 TIMES DAILY TO TEST BLOOD GLUCOSE LEVEL 100 strip 11    blood-glucose meter Misc 1 Device by Misc.(Non-Drug; Combo Route) route 3 (three) times daily. 1 each 0    furosemide (LASIX) 20 MG tablet Take 2 tablets (40 mg total) by mouth daily as needed (For Weight Gain > 2-3 lbs in 1 day or 4-6 lbs over 1 week notify PCP and take 40 mg daily for three days). 60 tablet 0    [DISCONTINUED] insulin aspart U-100 (NOVOLOG FLEXPEN U-100 INSULIN) 100 unit/mL (3 mL) InPn pen " Inject 20 Units into the skin 3 (three) times daily with meals. 15 mL 11      No current facility-administered medications on file prior to visit.            ALLERGIES:  Review of patient's allergies indicates:  No Known Allergies     REVIEW OF SYSTEMS:  Review of Systems   Constitutional:  Negative for diaphoresis, fever and weight loss.   Respiratory:  Negative for shortness of breath.    Cardiovascular:  Negative for chest pain.   Gastrointestinal:  Negative for blood in stool.   Genitourinary:  Negative for hematuria.   Endo/Heme/Allergies:  Does not bruise/bleed easily.   All other systems reviewed and are negative.           OBJECTIVE:     PHYSICAL EXAMINATION:         Vitals:     03/06/24 1610   BP: (!) 188/99   Pulse: 84         Physical Exam:  Vitals reviewed.     Constitutional: He appears well-developed and well-nourished.      Eyes: Pupils are equal, round, and reactive to light. Conjunctivae and EOM are normal.      Cardiovascular: Normal distal pulses and no edema.      Abdominal: Soft.      Skin: Skin displays no rash on trunk and no rash on extremities. Skin displays no lesions on trunk and no lesions on extremities.      Psych/Behavior: He is alert. He is oriented to person, place, and time. He has a normal mood and affect.      Musculoskeletal:        Neck: Range of motion is limited.      Neurological:        DTRs: Tricep reflexes are 0 on the right side and 0 on the left side. Bicep reflexes are 0 on the right side and 0 on the left side. Brachioradialis reflexes are 0 on the right side and 0 on the left side. Patellar reflexes are 0 on the right side and 0 on the left side. Achilles reflexes are 0 on the right side and 0 on the left side.         Back Exam      Muscle Strength   Right Quadriceps:  5/5   Left Quadriceps:  5/5   Right Hamstrings:  5/5   Left Hamstrings:  5/5                  SI joint:   Palpation at the right and left SI joints not painful  HUBER test is negative  bilaterally  Gaenslen test is negative bilaterally  Thigh thrust test is negative bilaterally     Neurologic Exam      Mental Status   Oriented to person, place, and time.   Speech: speech is normal   Level of consciousness: alert     Cranial Nerves   Cranial nerves II through XII intact.      CN III, IV, VI   Pupils are equal, round, and reactive to light.  Extraocular motions are normal.      Motor Exam   Muscle bulk: normal  Overall muscle tone: normal     Strength   Right deltoid: 5/5  Left deltoid: 5/5  Right biceps: 5/5  Left biceps: 5/5  Right triceps: 5/5  Left triceps: 5/5  Right wrist flexion: 5/5  Left wrist flexion: 5/5  Right wrist extension: 5/5  Left wrist extension: 5/5  Right interossei: 4/5  Left interossei: 4/5  Right iliopsoas: 5/5  Left iliopsoas: 5/5  Right quadriceps: 5/5  Left quadriceps: 5/5  Right hamstrin/5  Left hamstrin/5  Right anterior tibial: 5/5  Left anterior tibial: 5/5  Right posterior tibial: 5/5  Left posterior tibial: 5/5  Right peroneal: 5/5  Left peroneal: 5/5  Right gastroc: 5/5  Left gastroc: 5/5     Sensory Exam   Light touch normal.   Pinprick normal.      Gait, Coordination, and Reflexes      Reflexes   Right brachioradialis: 0  Left brachioradialis: 0  Right biceps: 0  Left biceps: 0  Right triceps: 0  Left triceps: 0  Right patellar: 0  Left patellar: 0  Right achilles: 0  Left achilles: 0  Right plantar: normal  Left plantar: normal  Right Castro: absent  Left Castro: absent  Right ankle clonus: absent  Left ankle clonus: absent           DIAGNOSTIC DATA:  I personally interpreted the following imaging:   Repeat MRI of the cervical spine shows no cord compression, no intramedullary signal change  CT cervical spine shows consolidation of the fusion from C4-C7     ASSESSMENT:  This is a 68 y.o. male with      Problem List Items Addressed This Visit                     Neuro     Chronic pain syndrome - Primary      Other Visit Diagnoses         Complex regional  pain syndrome type 2 of left upper extremity                       PLAN:  We will proceed with placement of left sided pulse generator for spinal cord stimulation today. I explained the natural history of the disease and all treatment options.      We have discussed the risks of surgery including death, coma, bleeding, infection, failure of surgery, weakness, paralysis, peripheral neuropathy, malplaced hardware, migration of hardware, need for reoperation. Patient understands the risks and would like to proceed with surgery.         Khadar Payne MD  Cell:[phone]

## 2024-09-03 NOTE — TRANSFER OF CARE
"Anesthesia Transfer of Care Note    Patient: Vick Gonzalez    Procedure(s) Performed: Procedure(s) (LRB):  INSERTION, NEUROSTIMULATOR, SPINAL (N/A)    Patient location: PACU    Anesthesia Type: general    Transport from OR: Transported from OR on 6-10 L/min O2 by face mask with adequate spontaneous ventilation    Post pain: adequate analgesia    Post assessment: no apparent anesthetic complications    Post vital signs: stable    Level of consciousness: awake    Nausea/Vomiting: no nausea/vomiting    Complications: none    Transfer of care protocol was followed      Last vitals: Visit Vitals  BP (!) 167/105 (BP Location: Right arm, Patient Position: Lying)   Pulse 85   Temp 36.8 °C (98.2 °F) (Skin)   Resp 16   Ht 6' 2" (1.88 m)   Wt 120.7 kg (266 lb)   SpO2 97%   BMI 34.15 kg/m²     "

## 2024-09-03 NOTE — PLAN OF CARE
Pt meets all OPS discharge criteria. VSS. Pain well controlled. Able to void. Tolerating clear liquids. IV discontinued and discharge instructions given.

## 2024-09-03 NOTE — OP NOTE
Date of surgery 09/03/2024    Preop diagnosis   1. Chronic pain syndrome   2. Complex regional pain syndrome type 2 left upper extremity    Postop diagnosis   Same    Surgery   1. Placement of spinal cord stimulator pulse generator, left lumbar area, Abbott Eterna   2. Removal of existing temporary extensions    Surgeon   Khadar Payne MD    Indication   01/29/24  This is a very pleasant 68 y.o. male, liver transplant, chronic kidney disease stage 3, type 2 diabetes not well-controlled, status post C4-7 anterior cervical fusion for cervical spondylosis with myelopathy and radiculopathy performed by Dr. Christian on 08/03/2022.  Patient reports left arm pain and hand pain in the C8 distribution becoming progressively worse.  The pain was present before his spine surgery, did not improve after surgery and now has become worse.  His chronic gait imbalance has remained stable.  His bilateral hand numbness and hand weakness have remained stable.  He underwent a left C7-T1 transforaminal epidural steroid injection the pain management in May 2023 with no significant pain relief         INTERIM HISTORY:  03/06/24     Completed a CT and MRI of the cervical spine which did not show significant cord compression.  He has persistent severe foraminal stenosis at C4-5, C5-6, C6-7.  There is no significant foraminal stenosis at C3-4.  The patient complains of persistent left arm pain radiating down all his fingers and thumb on the left side.  Pain is associated with numbness.  He has difficulty using his left arm.  He reports some weakness.  His gait imbalance has remained quite stable.  The pain is affecting his quality of life and functional status.  Pain is constant.  He denies any change of temperature, edema, trophic changes.     09/03/24  He underwent a C2-3 paddle lead placement 5 days ago. He reports 100% pain relief in his left arm and hand since surgery. Only complains of incisional neck pain. Has remaining left hand  ""tightness" felling but overall he is very satisfied with the left hand pain relief. Reports also improved dexterity with his left hand.    Procedure   The patient was intubated under general anesthesia and positioned prone on bolsters, all pressure points were carefully padded.  The thoracolumbar area was prepped and draped in a typical sterile fashion.  We reopened the incision over the left thoracic area containing the connection for the extensions and the distal extremity of the leads.  The staples were removed and the sutures were removed.  The extension were disconnected from the leads using the screwdriver.  The large connecting portion of the extensions were cut and discarded and the representative pole the distal extremity of the extensions out of the sterile field.  We then planned an incision for the pulse generator over the left lumbar area.  Local anesthesia with 1% lidocaine with epi.  The skin was incised and hemostasis was carried out.  A subcutaneous pocket was created with the Bovie.  We then used a tunneler and tunneled the distal extremity of the paddle leads towards the pulse generator site.  The leads were then connected to the pulse generator and an impedance check was performed by the Abbott representative, all contacts were showing good impedance.  The locking mechanism was engaged.  We then placed the pulse generator after making some retention loops of the leads in its pocket.  Irrigation and hemostasis.  Vancomycin powder application in each incision.  The deep layer was closed with interrupted 2-0 Vicryl.  The dermal layer was closed with inverted interrupted 3-0 Vicryl.  The skin was closed with a running subcuticular 4-0 Monocryl.  Mastisol Steri-Strips Telfa Tegaderm for dressing.  No complication.  Blood loss was estimated at less than 10 cc.  "

## 2024-09-03 NOTE — ANESTHESIA PREPROCEDURE EVALUATION
"                                                                                                             8/29/2024  Vick Gonzalez is a 68 y.o., male with PMHx of OPMhx of DM T2, chronic pain syndrome, CKD 3, CHF, hypertension, hypothyroidism, ETOH abuse s/p liver tx (2013), NSTEMI hospitalized 08/02/2023, discharged 08/03/2023.    Echo 8/2/23:    Left Ventricle: The left ventricle is normal in size. Normal wall thickness. Normal wall motion. There is normal systolic function with a visually estimated ejection fraction of 55 - 60%. Grade I diastolic dysfunction.    Left Atrium: Left atrium is mildly dilated.    Right Ventricle: Normal right ventricular cavity size. Systolic function is normal.    Right Atrium: Right atrium is mildly dilated.    Aortic Valve: The aortic valve is a trileaflet valve. There is moderate aortic valve sclerosis.    Mitral Valve: There is no stenosis. The mean pressure gradient across the mitral valve is 1 mmHg at a heart rate of  bpm. There is trace regurgitation.    Tricuspid Valve: There is trace regurgitation.    Pulmonic Valve: There is no significant stenosis. The estimated PA systolic pressure is 20 mmHg.    IVC/SVC: Normal venous pressure at 3 mmHg.          Per family medicine Dr. Lopez, " Medically stable for upcoming neurosurgery procedure at moderate risk given Risk factors of CAD, diabetes, heart failure.  He is currently well compensated.  EKG and labs are stable.  No acute cardiopulmonary symptoms that would necessitate urgent delay of is upcoming procedure. "    Past Medical History:   Diagnosis Date    Acute congestive heart failure 5/12/2023    Anemia     Anxiety     Cataract     Chronic pain syndrome 07/13/2011    CKD (chronic kidney disease), stage III     Coronary artery disease involving native coronary artery of native heart with angina pectoris 8/24/2023    Diabetes mellitus type II, uncontrolled     Discitis of lumbosacral region 01/16/2015    ED (erectile " dysfunction)     Encounter for blood transfusion     Genital herpes     Gout, arthritis     History of alcohol abuse     History of hepatitis C, s/p successful Rx w/ SVR24 (cure) - 5/2018 08/23/2011    Completed 24wks Epclusa + RBV w/SVR12 - 2/2018  -     History of positive PPD, treatment status unknown     Pulmonary granulomas, negative sputum cultures for AFB and indeterminate quantferon test    History of substance abuse     Hypertension     Hypothyroidism     Liver replaced by transplant 08/23/2011    DATE: 12/16/2013  LIVER BIOPSY : REASON:  hep C staging  PATHOLOGY COMMITTEE NOTE/PLAN: :grade  1 / stage 1        Pancreatitis 2016    Peptic ulcer disease     Pulmonary emphysema, unspecified emphysema type 5/26/2023     Past Surgical History:   Procedure Laterality Date    ANTERIOR CERVICAL DISCECTOMY W/ FUSION N/A 8/22/2022    Procedure: Procedure: ACDF 4-7 LOS: 4.0 Anesthesia: General Blood: Type & Screen Radiology: C-Arm Microscope: ------- SNS: EMG, SEP, MEP Brace: Marshall Bed: Regular Bed, Shoulder Strap Headrest:------ Position: Supine Equipment: Depuy;  Surgeon: Titi Christian MD;  Location: [facility] OR;  Service: Neurosurgery;  Laterality: N/A;  Depuy  confirmed CW 8/19  Neuro monitoring confirmed CW 8/19    CARPAL TUNNEL RELEASE Left 10/29/2021    Procedure: RELEASE, CARPAL TUNNEL;  Surgeon: Jameson Alejo Jr., MD;  Location: [facility] OR;  Service: Orthopedics;  Laterality: Left;    CHOLECYSTECTOMY      CORONARY ANGIOGRAPHY N/A 8/2/2023    Procedure: ANGIOGRAM, CORONARY ARTERY;  Surgeon: Juan Vera MD;  Location: [facility] CATH LAB/EP;  Service: Cardiology;  Laterality: N/A;    DECOMPRESSION OF CERVICAL SPINE BY ANTERIOR APPROACH WITH FUSION  8/22/2022    Procedure: DECOMPRESSION AND FUSION, SPINE, CERVICAL, ANTERIOR APPROACH;  Surgeon: Titi Christian MD;  Location: [facility] OR;  Service: Neurosurgery;;    INJECTION OF JOINT Right 12/2/2019    Procedure: INJECTION, JOINT SI;  Surgeon: Thu Penn MD;   Location: [facility] PAIN MGT;  Service: Pain Management;  Laterality: Right;  RT SI JNT INJ    LAMINECTOMY, SPINE, FOR NEUROSTIMULATOR ELECTRODE INSERTION N/A 8/29/2024    Procedure: LAMINECTOMY, SPINE, FOR NEUROSTIMULATOR ELECTRODE INSERTION;  Surgeon: Khadar Payne MD;  Location: [facility] OR;  Service: Neurosurgery;  Laterality: N/A;  Procedure: C1-2 laminectomy for paddle lead placement for spinal cord stimulator trial, extension   Length of procedure: 2 hours  LOS: 0 nights  Anesthesia:General  Blood:Type and screen  Radiology:C-arm  SNS:EMG,SEP,MEP  Position:Pro    LEFT HEART CATHETERIZATION Left 8/2/2023    Procedure: Left heart cath;  Surgeon: Juan Vera MD;  Location: [facility] CATH LAB/EP;  Service: Cardiology;  Laterality: Left;    LIVER TRANSPLANT  06/2010    SPINE SURGERY      TRANSFORAMINAL EPIDURAL INJECTION OF STEROID Left 5/29/2023    Procedure: INJECTION, STEROID, EPIDURAL, TRANSFORAMINAL APPROACH,  LEFT C7-T1 DIRECT REF;  Surgeon: Thu Penn MD;  Location: [facility] PAIN MGT;  Service: Pain Management;  Laterality: Left;  4/3 MD ILL/PT WILL C/B     Review of patient's allergies indicates:  No Known Allergies  Current Outpatient Medications   Medication Instructions    allopurinoL (ZYLOPRIM) 100 MG tablet TAKE 1 TABLET BY MOUTH TWICE A DAY    aluminum-magnesium hydroxide-simethicone (MAALOX) 200-200-20 mg/5 mL Susp 30 mLs, Oral, Before meals & nightly    atorvastatin (LIPITOR) 40 mg, Oral, Daily    blood-glucose meter Misc 1 Device, Misc.(Non-Drug; Combo Route), 3 times daily    blood-glucose meter,continuous (DEXCOM G7 ) Misc Use as directed.    blood-glucose sensor (DEXCOM G7 SENSOR) Viviane Use as directed.    blood-glucose transmitter (DEXCOM G6 TRANSMITTER) Viviane Use as directed    calcium carbonate-vitamin D3 (CALCIUM 600 WITH VITAMIN D3) 600 mg(1,500mg) -400 unit Chew 1 tablet, Oral, Daily    clindamycin (CLEOCIN) 150 mg, Oral, Every 6 hours    cyanocobalamin (VITAMIN B-12) 100 mcg, Oral, Daily  "   diphenhydrAMINE (BENADRYL) 25 mg, Oral, Daily PRN    furosemide (LASIX) 40 mg, Oral, Daily PRN    gabapentin (NEURONTIN) 800 mg, Oral, 3 times daily    HYDROmorphone (DILAUDID) 4 mg, Oral, Every 4 hours PRN    insulin lispro (HUMALOG KWIKPEN INSULIN) 20 Units, Subcutaneous, 3 times daily with meals    ketorolac (TORADOL) 10 mg, Oral, Every 6 hours    lancets 30 gauge Misc 1 lancet , Misc.(Non-Drug; Combo Route), Before meals & nightly    levothyroxine (SYNTHROID) 88 MCG tablet TAKE 1 TABLET BY MOUTH EVERY DAY    lisinopriL (PRINIVIL,ZESTRIL) 20 mg, Oral    methocarbamoL (ROBAXIN) 750 mg, Oral, 3 times daily PRN    metoprolol succinate (TOPROL-XL) 200 mg, Oral, Daily    MOUNJARO 2.5 mg, Subcutaneous, Every 7 days    multivit with min-folic acid (MEN'S MULTIVITAMIN GUMMIES) 200 mcg Chew 1 tablet, Oral, Daily    NIFEdipine (PROCARDIA-XL) 30 mg, Oral    NOVOFINE PLUS 32 gauge x 1/6" Ndle No dose, route, or frequency recorded.    papaverine 30 mg/mL injection Tri-Mix - PGE (alprostadil) 10mcg, papavarine 30mg, phentolamine 1mg; dispense 5mL vial, typical starting is 0.2 mL which is equal to 20 units    sacubitriL-valsartan (ENTRESTO) 49-51 mg per tablet 1 tablet, Oral, 2 times daily    sildenafiL (VIAGRA) 50 mg, Oral, Daily PRN    sildenafiL (VIAGRA) 100 mg, Oral, Daily PRN    tacrolimus (PROGRAF) 1 MG Cap Take 2 capsules (2 mg total) by mouth every morning AND 1 capsule (1 mg total) every evening.    TOUJEO MAX U-300 SOLOSTAR 40 Units, Subcutaneous, Daily    traZODone (DESYREL) 50 mg, Oral, Nightly    TRUE METRIX GLUCOSE TEST STRIP Strp USE 3 TIMES DAILY TO TEST BLOOD GLUCOSE LEVEL       Pre-op Assessment    I have reviewed the Patient Summary Reports.     I have reviewed the Nursing Notes. I have reviewed the NPO Status.   I have reviewed the Medications.     Review of Systems  Anesthesia Hx:  No problems with previous Anesthesia   History of prior surgery of interest to airway management or planning: cervical " fusion.           Denies Personal Hx of Anesthesia complications.                    Social:  Former Smoker, No Alcohol Use       Cardiovascular:  Exercise tolerance: good   Denies Pacemaker. Hypertension   CAD       Denies Angina. CHF   PVD hyperlipidemia             Peripheral Arterial Disease                   Pulmonary:   COPD (patient denies diagnosis), mild    Denies Shortness of breath.   Calcified granuloma of lung                  Renal/:  Chronic Renal Disease, CKD  BPH              Hepatic/GI:   PUD,    Hepatitis (treated in 2018), C S/p liver transplant in 2016          Musculoskeletal:  Arthritis          Spine Disorders: lumbar            Neurological:  Denies TIA.  Denies CVA. Neuromuscular Disease,   Denies Seizures.          Chronic Pain Syndrome                         Endocrine:  Diabetes (a1c 6.3 on 7/30/24), well controlled, type 2 Hypothyroidism          Psych:   anxiety                 Physical Exam  General: Cooperative, Oriented, Well nourished and Alert    Airway:  Mallampati: IV / II/ III  Mouth Opening: Normal  TM Distance: Normal  Tongue: Normal  Neck ROM: Normal ROM    Dental:  Dentures      Lab Results   Component Value Date    WBC 8.42 07/30/2024    HGB 12.5 (L) 07/30/2024    HCT 37.7 (L) 07/30/2024     07/30/2024    CHOL 143 08/03/2023    TRIG 234 (H) 08/03/2023    HDL 21 (L) 08/03/2023    ALT 23 07/30/2024    AST 19 07/30/2024     07/30/2024    K 3.8 07/30/2024     07/30/2024    CREATININE 1.3 07/30/2024    BUN 18 07/30/2024    CO2 21 (L) 07/30/2024    TSH 1.999 07/30/2024    PSA 0.22 01/19/2022    INR 1.1 07/30/2024    HGBA1C 6.3 (H) 07/30/2024     Results for orders placed or performed in visit on 07/30/24   SCHEDULED EKG 12-LEAD (to Muse)    Collection Time: 07/30/24  6:50 AM   Result Value Ref Range    QRS Duration 88 ms    [facility] QTC Calculation 464 ms    Narrative    Test Reason : Z01.818,D69.6,E03.9,E11.42,Z79.4,I73.9,N18.30,    Vent. Rate : 087 BPM      Atrial Rate : 087 BPM     P-R Int : 162 ms          QRS Dur : 088 ms      QT Int : 386 ms       P-R-T Axes : 064 044 035 degrees     QTc Int : 464 ms    Normal sinus rhythm  Normal ECG  When compared with ECG of 01-JUL-2024 12:16,  Nonspecific T wave abnormality no longer evident in Lateral leads  Confirmed by Cameron Beyer MD ([id number]) on 7/30/2024 8:44:58 AM    Referred By: SHAZIA HERRMANN           Confirmed By:Cameron Beyer MD     EXAMINATION:  XR CHEST PA AND LATERAL    CLINICAL HISTORY:  Encounter for other preprocedural examination    TECHNIQUE:  PA and lateral views of the chest were performed.    COMPARISON:  Chest radiograph 08/02/2023.    FINDINGS:  Mediastinal structures are midline. Normal mediastinal and hilar contours. Normal size cardiac silhouette. Lungs are symmetrically expanded well aerated. Stable calcified granuloma at right lung base. No focal consolidation or mass. No pneumothorax. No significant pleural fluid. Partially imaged postsurgical change in the cervical spine.    Impression:    No new intrathoracic abnormality.    Electronically signed by: Yair Sierra  Date: 07/30/2024     Anesthesia Plan  Type of Anesthesia, risks & benefits discussed:    Anesthesia Type: Gen ETT  Intra-op Monitoring Plan: Standard ASA Monitors  Post Op Pain Control Plan: multimodal analgesia and IV/PO Opioids PRN  Induction:  IV  Airway Plan: Video, Post-Induction  Informed Consent: Informed consent signed with the Patient and all parties understand the risks and agree with anesthesia plan.  All questions answered.   ASA Score: 3  Day of Surgery Review of History & Physical: H&P Update referred to the surgeon/provider.  Anesthesia Plan Notes:       Ready For Surgery From Anesthesia Perspective.     .

## 2024-09-03 NOTE — ANESTHESIA PROCEDURE NOTES
Intubation    Date/Time: 9/3/2024 7:41 AM    Performed by: Sally Waller  Authorized by: Sundar Quezada MD    Intubation:     Induction:  Intravenous    Intubated:  Postinduction    Mask Ventilation:  Easy mask    Attempted By:  Student and staff anesthesiologist (Sally Waller)    Method of Intubation:  Video laryngoscopy and bougie    Blade:  Nagel 3    Laryngeal View Grade: Grade IIb - only the arytenoids and epiglottis seen      Difficult Airway Encountered?: No      Complications:  None    Airway Device:  Oral endotracheal tube    Airway Device Size:  7.5    Style/Cuff Inflation:  Cuffed    Tube secured:  21    Secured at:  The teeth    Placement Verified By:  Capnometry    Complicating Factors:  Anterior larynx and poor neck/head extension    Findings Post-Intubation:  Atraumatic/condition of teeth unchanged

## 2024-09-04 NOTE — DISCHARGE SUMMARY
"  [facility])  Neurosurgery  Discharge Summary      Patient Name: Vick Gonzalez  MRN: [id number]  Admission Date: 9/3/2024  Hospital Length of Stay: 0 days  Discharge Date and Time: 9/3/2024 11:35 AM  Attending Physician: No att. providers found   Discharging Provider: Khadar Payne MD  Primary Care Provider: Emanuel Lopez MD     HPI: He underwent a C2-3 paddle lead placement 5 days ago. He reports 100% pain relief in his left arm and hand since surgery. Only complains of incisional neck pain. Has remaining left hand "tightness" felling but overall he is very satisfied with the left hand pain relief. Reports also improved dexterity with his left hand.     Procedure(s) (LRB):  1. Placement of spinal cord stimulator pulse generator, left lumbar area, Abbott Eterna   2. Removal of existing temporary extensions    Hospital Course: The surgery was uncomplicated and postoperative course uneventful.  Patient was able to be discharged after voiding.      Consults:     Significant Diagnostic Studies: N/A    Pending Diagnostic Studies:       None          Final Active Diagnoses:    Diagnosis Date Noted POA    PRINCIPAL PROBLEM:  Chronic pain syndrome [G89.4] 07/13/2011 Yes    Complex regional pain syndrome type 2 of left upper extremity [G56.42] 07/12/2024 Yes      Problems Resolved During this Admission:      Discharged Condition: good    Disposition: Home or Self Care    Follow Up:    Patient Instructions:      Diet general     Remove dressing in 72 hours     Medications:  Reconciled Home Medications:      Medication List        CHANGE how you take these medications      HYDROmorphone 4 MG tablet  Commonly known as: DILAUDID  Take 1 tablet (4 mg total) by mouth every 4 (four) hours as needed for Pain.  What changed: medication strength     papaverine 30 mg/mL injection  Tri-Mix - PGE (alprostadil) 10mcg, papavarine 30mg, phentolamine 1mg; dispense 5mL vial, typical starting is 0.2 mL which is equal " to 20 units  What changed:   when to take this  reasons to take this            CONTINUE taking these medications      allopurinoL 100 MG tablet  Commonly known as: ZYLOPRIM  TAKE 1 TABLET BY MOUTH TWICE A DAY     aluminum-magnesium hydroxide-simethicone 200-200-20 mg/5 mL Susp  Commonly known as: MAALOX  Take 30 mLs by mouth 4 (four) times daily before meals and nightly.     atorvastatin 40 MG tablet  Commonly known as: LIPITOR  Take 1 tablet (40 mg total) by mouth once daily.     blood-glucose meter Misc  1 Device by Misc.(Non-Drug; Combo Route) route 3 (three) times daily.     calcium carbonate-vitamin D3 600 mg-10 mcg (400 unit) Chew  Commonly known as: CALCIUM 600 WITH VITAMIN D3  Take 1 tablet by mouth once daily.     clindamycin 150 MG capsule  Commonly known as: CLEOCIN  Take 1 capsule (150 mg total) by mouth every 6 (six) hours. for 4 days     cyanocobalamin 100 MCG tablet  Commonly known as: VITAMIN B-12  Take 100 mcg by mouth once daily.     DEXCOM G6 TRANSMITTER Viviane  Generic drug: blood-glucose transmitter  Use as directed     DEXCOM G7  Misc  Generic drug: blood-glucose meter,continuous  Use as directed.     DEXCOM G7 SENSOR Viviane  Generic drug: blood-glucose sensor  Use as directed.     diphenhydrAMINE 25 mg capsule  Commonly known as: BENADRYL  Take 25 mg by mouth daily as needed for Allergies (Cold).     ENTRESTO 49-51 mg per tablet  Generic drug: sacubitriL-valsartan  Take 1 tablet by mouth 2 (two) times daily.     furosemide 20 MG tablet  Commonly known as: LASIX  Take 2 tablets (40 mg total) by mouth daily as needed (For Weight Gain > 2-3 lbs in 1 day or 4-6 lbs over 1 week notify PCP and take 40 mg daily for three days).     gabapentin 800 MG tablet  Commonly known as: NEURONTIN  TAKE 1 TABLET BY MOUTH THREE TIMES A DAY     insulin lispro 100 unit/mL pen  Commonly known as: HumaLOG KwikPen Insulin  Inject 20 Units into the skin 3 (three) times daily with meals.     lancets 30 gauge  "Misc  1 lancet by Misc.(Non-Drug; Combo Route) route 4 (four) times daily before meals and nightly.     levothyroxine 88 MCG tablet  Commonly known as: SYNTHROID  TAKE 1 TABLET BY MOUTH EVERY DAY     lisinopriL 20 MG tablet  Commonly known as: PRINIVIL,ZESTRIL  Take 20 mg by mouth.     MEN'S MULTIVITAMIN GUMMIES 200 mcg Chew  Generic drug: multivit with min-folic acid  Take 1 tablet by mouth once daily.     methocarbamoL 750 MG Tab  Commonly known as: ROBAXIN  Take 1 tablet (750 mg total) by mouth 3 (three) times daily as needed (muscle spasms).     metoprolol succinate 200 MG 24 hr tablet  Commonly known as: TOPROL-XL  Take 1 tablet (200 mg total) by mouth once daily.     MOUNJARO 2.5 mg/0.5 mL Pnij  Generic drug: tirzepatide  Inject 2.5 mg into the skin every 7 days.     NIFEdipine 30 MG (OSM) 24 hr tablet  Commonly known as: PROCARDIA-XL  TAKE 1 TABLET BY MOUTH EVERY DAY     NOVOFINE PLUS 32 gauge x 1/6" Ndle  Generic drug: pen needle, diabetic     * sildenafiL 50 MG tablet  Commonly known as: VIAGRA  Take 1 tablet (50 mg total) by mouth daily as needed for Erectile Dysfunction.     * sildenafiL 100 MG tablet  Commonly known as: VIAGRA  Take 1 tablet (100 mg total) by mouth daily as needed for Erectile Dysfunction.     tacrolimus 1 MG Cap  Commonly known as: PROGRAF  Take 2 capsules (2 mg total) by mouth every morning AND 1 capsule (1 mg total) every evening.     TOUJEO MAX U-300 SOLOSTAR 300 unit/mL (3 mL) insulin pen  Generic drug: insulin glargine U-300 conc  Inject 40 Units into the skin once daily.     traZODone 50 MG tablet  Commonly known as: DESYREL  TAKE 1 TABLET BY MOUTH EVERY DAY IN THE EVENING     TRUE METRIX GLUCOSE TEST STRIP Strp  Generic drug: blood sugar diagnostic  USE 3 TIMES DAILY TO TEST BLOOD GLUCOSE LEVEL           * This list has 2 medication(s) that are the same as other medications prescribed for you. Read the directions carefully, and ask your doctor or other care provider to review " them with you.                STOP taking these medications      ketorolac 10 mg tablet  Commonly known as: TORADOL              Khadar Payne MD  Neurosurgery  [facility])

## 2024-09-05 ENCOUNTER — TELEPHONE (OUTPATIENT)
Dept: TRANSPLANT | Facility: CLINIC | Age: 69
End: 2024-09-05
Payer: MEDICARE

## 2024-09-05 VITALS
SYSTOLIC BLOOD PRESSURE: 168 MMHG | HEIGHT: 74 IN | DIASTOLIC BLOOD PRESSURE: 87 MMHG | BODY MASS INDEX: 34.14 KG/M2 | TEMPERATURE: 98 F | RESPIRATION RATE: 18 BRPM | HEART RATE: 86 BPM | OXYGEN SATURATION: 97 % | WEIGHT: 266 LBS

## 2024-09-05 NOTE — TELEPHONE ENCOUNTER
Pt no showed labs 9/4/24 and was unable to reschedule. Missed lab letter sent. Pt will need to call the office to reschedule.

## 2024-09-10 ENCOUNTER — TELEPHONE (OUTPATIENT)
Dept: NEUROSURGERY | Facility: CLINIC | Age: 69
End: 2024-09-10

## 2024-09-10 ENCOUNTER — HOSPITAL ENCOUNTER (OUTPATIENT)
Dept: RADIOLOGY | Facility: HOSPITAL | Age: 69
Discharge: HOME | End: 2024-09-10
Attending: NEUROLOGICAL SURGERY
Payer: MEDICARE

## 2024-09-10 ENCOUNTER — OFFICE VISIT (OUTPATIENT)
Dept: NEUROSURGERY | Facility: CLINIC | Age: 69
End: 2024-09-10
Payer: MEDICARE

## 2024-09-10 VITALS — HEART RATE: 76 BPM | SYSTOLIC BLOOD PRESSURE: 186 MMHG | DIASTOLIC BLOOD PRESSURE: 105 MMHG

## 2024-09-10 DIAGNOSIS — Z96.89 S/P INSERTION OF SPINAL CORD STIMULATOR: Primary | ICD-10-CM

## 2024-09-10 DIAGNOSIS — G56.42 COMPLEX REGIONAL PAIN SYNDROME TYPE 2 OF LEFT UPPER EXTREMITY: ICD-10-CM

## 2024-09-10 PROCEDURE — 3044F HG A1C LEVEL LT 7.0%: CPT | Mod: CPTII,S$GLB,, | Performed by: PHYSICIAN ASSISTANT

## 2024-09-10 PROCEDURE — 72040 X-RAY EXAM NECK SPINE 2-3 VW: CPT | Mod: TC,FY

## 2024-09-10 PROCEDURE — 1160F RVW MEDS BY RX/DR IN RCRD: CPT | Mod: CPTII,S$GLB,, | Performed by: PHYSICIAN ASSISTANT

## 2024-09-10 PROCEDURE — 1159F MED LIST DOCD IN RCRD: CPT | Mod: CPTII,S$GLB,, | Performed by: PHYSICIAN ASSISTANT

## 2024-09-10 PROCEDURE — 99024 POSTOP FOLLOW-UP VISIT: CPT | Mod: S$GLB,,, | Performed by: PHYSICIAN ASSISTANT

## 2024-09-10 PROCEDURE — 72040 X-RAY EXAM NECK SPINE 2-3 VW: CPT | Mod: 26,,, | Performed by: RADIOLOGY

## 2024-09-10 PROCEDURE — 3077F SYST BP >= 140 MM HG: CPT | Mod: CPTII,S$GLB,, | Performed by: PHYSICIAN ASSISTANT

## 2024-09-10 PROCEDURE — 3080F DIAST BP >= 90 MM HG: CPT | Mod: CPTII,S$GLB,, | Performed by: PHYSICIAN ASSISTANT

## 2024-09-10 PROCEDURE — 1125F AMNT PAIN NOTED PAIN PRSNT: CPT | Mod: CPTII,S$GLB,, | Performed by: PHYSICIAN ASSISTANT

## 2024-09-10 PROCEDURE — 3072F LOW RISK FOR RETINOPATHY: CPT | Mod: CPTII,S$GLB,, | Performed by: PHYSICIAN ASSISTANT

## 2024-09-10 PROCEDURE — 3288F FALL RISK ASSESSMENT DOCD: CPT | Mod: CPTII,S$GLB,, | Performed by: PHYSICIAN ASSISTANT

## 2024-09-10 PROCEDURE — 1101F PT FALLS ASSESS-DOCD LE1/YR: CPT | Mod: CPTII,S$GLB,, | Performed by: PHYSICIAN ASSISTANT

## 2024-09-10 PROCEDURE — 4010F ACE/ARB THERAPY RXD/TAKEN: CPT | Mod: CPTII,S$GLB,, | Performed by: PHYSICIAN ASSISTANT

## 2024-09-10 PROCEDURE — 99999 PR PBB SHADOW E&M-EST. PATIENT-LVL IV: CPT | Mod: PBBFAC,,, | Performed by: PHYSICIAN ASSISTANT

## 2024-09-10 RX ORDER — OXYCODONE AND ACETAMINOPHEN 10; 325 MG/1; MG/1
1 TABLET ORAL EVERY 4 HOURS PRN
Qty: 42 TABLET | Refills: 0 | Status: SHIPPED | OUTPATIENT
Start: 2024-09-10 | End: 2024-09-17

## 2024-09-10 NOTE — PROGRESS NOTES
Postoperative Wound Check:    Date of surgery 09/03/2024     Preop diagnosis   1. Chronic pain syndrome   2. Complex regional pain syndrome type 2 left upper extremity     Postop diagnosis   Same     Surgery   1. Placement of spinal cord stimulator pulse generator, left lumbar area, Abbott Eterna   2. Removal of existing temporary extensions     Surgeon   Khadar Payne MD             HPI:    Patient states that he has had relief of his hand symptoms from the stimulator.  He still has some pain in the left middle ring and pinky.  He has a lot of pain in the neck in the posterior neck region that radiates to behind the ears.  He feels like there is some swelling in the left neck to shoulder region.  No arm pain or paresthesias.    Patient denies any problems with the incisions.  No redness, swelling, or drainage, and no fever, chills, or sweats.      Physical Exam:    Vitals:    09/10/24 1307   BP: (!) 186/105   Pulse: 76   PainSc:   7   PainLoc: Back         Incision:  Wound edges are approximated.  No redness, swelling, or drainage.      Diagnosis:     1. S/P insertion of spinal cord stimulator              Plan:       Orders Placed This Encounter    oxyCODONE-acetaminophen (PERCOCET)  mg per tablet         -I removed the staples without any complications.  Chloraprep applied.  -SCS rep here to adjust stimulator settings  -xrays show good placement of the paddle lead without migration  -FU in 1 week for another wound check  -Reviewed pain level with Dr. Payne and he recommend percocet for one week          Paige Gilbert, [facility], PA-C  Neurosurgery  [facility]         [facility]  Speech Language Pathology  Adult Daily Note    Kathy Nichole  : 1945    Date: 2020    Visit Information:  SLP Insurance Information: Medicare  Total # of Visits Approved: 35  Total # of Visits to Date: 21  No Show: 0  Canceled Appointment: 1      Plan of care signed (Y/N):     Yes    Certification Period: 20-21  Plan of Care Visit # 21      Interventions used this date:  Receptive Language and Instruction in Compensatory Strategies    Subjective:  Patient was accompanied to therapy by friend this date. Clinician informed patient she will be gone  and covering therapist will be Anton James and patient verbalized understanding. Began discussion about appointment on . Patient said she would check with , but was maybe going to cancel on that day. Clinician said she would have Anton James check with her on . Alert and Cooperative       Objective/Assessment:   Patient progressing towards goals:  1. To increase safety awareness, judgment and complex verbal expression, pt will complete abstract reasoning tasks (i.e. Word deduction, convergent and divergent naming, similarities/differences) with 80% accuracy and min cues. Pt completed proverb/expression explanation task with 80% acc min cues  2. Pt will ID lowercase letters with 90% acc and standby cues to increase functional reading abilities  Goal met  Pt ID lowercase letters with 90% acc I and 100% acc standby-min cues   3. Pt will read short functional phrases with 80% acc min verbal cues to increase functional reading and communication    4. Patient will read single functional words in lowercase with 80% acc mild verbal cues to increase functional reading abilities    5. Patient will complete set 2 of CART with 80% acc min assist to increase functional writing and spelling for communication    Copy and Recall Treatment (CART) was targeted this date using Set 2.  Patient achieved 100% naming target items with Ind, 80% writing target items mild cues, 100% copying target items and 100% recalling target items. Mastery of set is reached with 80% acc of target items. Pt benefited from spelling words orally prior to writing. Goat met for set 2. Copy and Recall Treatment (CART) was targeted this date using Set 4. Patient achieved 100% naming target items with standby cues, 67% writing target items, 100% copying target items and 100% recalling target items. Mastery of set is reached with 80% acc of target items. Update goal: Patient will complete CART sets with 80% acc min assist to increase functional writing and spelling for communication        Pain Assessment:  Initial Assessment:  Patient denies pain. Re-assessment:  Patient denies pain. Plan:  Continue with current goals    Patient/Caregiver Education:  Patient/Caregiver educated on session.   Patient/Caregiver provided with home program: Phrase completion worksheet and CART set 4    Time in: 1000  Time out:1045  Minutes seen: 45      Signature: Electronically signed by ARRON Paz on 12/17/2020 at 10:46 AM

## 2024-09-10 NOTE — TELEPHONE ENCOUNTER
Please let him know Dr. Payne recommends Percocet for 7 days which I sent to [facility].    Paige Gilbert, HERMELINDO, PA-C  Neurosurgery  [facility]  09/10/2024

## 2024-09-17 ENCOUNTER — OFFICE VISIT (OUTPATIENT)
Dept: NEUROSURGERY | Facility: CLINIC | Age: 69
End: 2024-09-17
Payer: MEDICARE

## 2024-09-17 VITALS
BODY MASS INDEX: 34.15 KG/M2 | HEART RATE: 65 BPM | SYSTOLIC BLOOD PRESSURE: 107 MMHG | DIASTOLIC BLOOD PRESSURE: 74 MMHG | HEIGHT: 74 IN | WEIGHT: 266.13 LBS

## 2024-09-17 DIAGNOSIS — G56.42 COMPLEX REGIONAL PAIN SYNDROME TYPE 2 OF LEFT UPPER EXTREMITY: Primary | ICD-10-CM

## 2024-09-17 PROCEDURE — 3008F BODY MASS INDEX DOCD: CPT | Mod: CPTII,S$GLB,, | Performed by: PHYSICIAN ASSISTANT

## 2024-09-17 PROCEDURE — 99024 POSTOP FOLLOW-UP VISIT: CPT | Mod: S$GLB,,, | Performed by: PHYSICIAN ASSISTANT

## 2024-09-17 PROCEDURE — 99999 PR PBB SHADOW E&M-EST. PATIENT-LVL V: CPT | Mod: PBBFAC,,, | Performed by: PHYSICIAN ASSISTANT

## 2024-09-17 PROCEDURE — 1101F PT FALLS ASSESS-DOCD LE1/YR: CPT | Mod: CPTII,S$GLB,, | Performed by: PHYSICIAN ASSISTANT

## 2024-09-17 PROCEDURE — 1125F AMNT PAIN NOTED PAIN PRSNT: CPT | Mod: CPTII,S$GLB,, | Performed by: PHYSICIAN ASSISTANT

## 2024-09-17 PROCEDURE — 3288F FALL RISK ASSESSMENT DOCD: CPT | Mod: CPTII,S$GLB,, | Performed by: PHYSICIAN ASSISTANT

## 2024-09-17 PROCEDURE — 3078F DIAST BP <80 MM HG: CPT | Mod: CPTII,S$GLB,, | Performed by: PHYSICIAN ASSISTANT

## 2024-09-17 PROCEDURE — 1160F RVW MEDS BY RX/DR IN RCRD: CPT | Mod: CPTII,S$GLB,, | Performed by: PHYSICIAN ASSISTANT

## 2024-09-17 PROCEDURE — 1159F MED LIST DOCD IN RCRD: CPT | Mod: CPTII,S$GLB,, | Performed by: PHYSICIAN ASSISTANT

## 2024-09-17 PROCEDURE — 3074F SYST BP LT 130 MM HG: CPT | Mod: CPTII,S$GLB,, | Performed by: PHYSICIAN ASSISTANT

## 2024-09-17 PROCEDURE — 3072F LOW RISK FOR RETINOPATHY: CPT | Mod: CPTII,S$GLB,, | Performed by: PHYSICIAN ASSISTANT

## 2024-09-17 PROCEDURE — 4010F ACE/ARB THERAPY RXD/TAKEN: CPT | Mod: CPTII,S$GLB,, | Performed by: PHYSICIAN ASSISTANT

## 2024-09-17 PROCEDURE — 3044F HG A1C LEVEL LT 7.0%: CPT | Mod: CPTII,S$GLB,, | Performed by: PHYSICIAN ASSISTANT

## 2024-09-17 RX ORDER — METHOCARBAMOL 750 MG/1
750 TABLET, FILM COATED ORAL 3 TIMES DAILY PRN
Qty: 60 TABLET | Refills: 0 | Status: SHIPPED | OUTPATIENT
Start: 2024-09-17

## 2024-09-17 RX ORDER — OXYCODONE AND ACETAMINOPHEN 10; 325 MG/1; MG/1
1 TABLET ORAL EVERY 4 HOURS PRN
Qty: 42 TABLET | Refills: 0 | Status: SHIPPED | OUTPATIENT
Start: 2024-09-17 | End: 2024-09-24

## 2024-09-17 NOTE — PROGRESS NOTES
"  Postoperative Wound Check:    Date of surgery 09/03/2024     Preop diagnosis   1. Chronic pain syndrome   2. Complex regional pain syndrome type 2 left upper extremity     Postop diagnosis   Same     Surgery   1. Placement of spinal cord stimulator pulse generator, left lumbar area, Abbott Eterna   2. Removal of existing temporary extensions     Surgeon   Khadar Payne MD             HPI:    Neck pain.  No arm pain or paresthesias.  No left hand pain.    Patient denies any problems with the incisions.  No redness, swelling, or drainage, and no fever, chills, or sweats.      Physical Exam:    Vitals:    09/17/24 1311   BP: 107/74   Pulse: 65   Weight: 120.7 kg (266 lb 1.5 oz)   Height: 6' 2" (1.88 m)   PainSc:   7   PainLoc: Back         Incision:  Wound edges are approximated.  No redness, swelling, or drainage.      Diagnosis:     1. Complex regional pain syndrome type 2 of left upper extremity              Plan:       Orders Placed This Encounter    oxyCODONE-acetaminophen (PERCOCET)  mg per tablet    methocarbamoL (ROBAXIN) 750 MG Tab         -I reviewed two areas with steri strips.  Incisions cleaned with chloroprep.  -Refilled Percocet and robaxin  -Fu in 4 weeks with xrays for 6 week po fu          Paige Gilbert, [facility], PA-C  Neurosurgery  [facility]          "

## 2024-09-20 DIAGNOSIS — Z79.4 TYPE 2 DIABETES MELLITUS WITH DIABETIC POLYNEUROPATHY, WITH LONG-TERM CURRENT USE OF INSULIN: ICD-10-CM

## 2024-09-20 DIAGNOSIS — E11.42 TYPE 2 DIABETES MELLITUS WITH DIABETIC POLYNEUROPATHY, WITH LONG-TERM CURRENT USE OF INSULIN: ICD-10-CM

## 2024-09-20 RX ORDER — BLOOD-GLUCOSE SENSOR
EACH MISCELLANEOUS
Qty: 3 EACH | Refills: 11 | Status: SHIPPED | OUTPATIENT
Start: 2024-09-20

## 2024-09-20 RX ORDER — BLOOD-GLUCOSE TRANSMITTER
EACH MISCELLANEOUS
Qty: 1 EACH | Refills: 11 | Status: SHIPPED | OUTPATIENT
Start: 2024-09-20

## 2024-09-20 RX ORDER — BLOOD-GLUCOSE,RECEIVER,CONT
EACH MISCELLANEOUS
Qty: 1 EACH | Refills: 11 | Status: SHIPPED | OUTPATIENT
Start: 2024-09-20

## 2024-09-20 NOTE — TELEPHONE ENCOUNTER
No care due was identified.  [facility] Embedded Care Due Messages. Reference number: [id number].   9/20/2024 1:06:34 PM CDT

## 2024-09-24 NOTE — ED PROVIDER NOTES
Encounter Date: 1/23/2020    SCRIBE #1 NOTE: I, Linda Choudhary, am scribing for, and in the presence of,  Kameron Simon Jr, MD. I have scribed the entire note.       History     Chief Complaint   Patient presents with    Abdominal Pain     Pt reports hx of liver transplant in 2009. Reports abdominal pain and ascites that started this AM. Also complaining of nausea without vomiting and hyperglycemia.      Vick Gonzalez is a 64 y.o. male who  has a past medical history of Anemia, Anxiety, Chronic pain syndrome (7/13/2011), CKD (chronic kidney disease), stage III, Diabetes mellitus type II, uncontrolled, Discitis of lumbosacral region (1/16/2015), ED (erectile dysfunction), Genital herpes, Gout, arthritis, History of alcohol abuse, History of hepatitis C, s/p successful Rx w/ SVR24 (cure) - 5/2018 (8/23/2011), History of positive PPD, treatment status unknown, History of substance abuse, Hypertension, Hypothyroidism, Liver replaced by transplant (8/23/2011), Pancreatitis (2016), and Peptic ulcer disease.    The patient presents to the ED due to generalized abdominal pain that began today. The abdominal pain has neither alleviating nor aggravating factors. His associated symptoms include N/V. The patient denies fever, diarrhea, any changes in urination, or any other concerning symptoms. He last saw his transplant doctor on 08/12 and has been in contact via telephone in interim.     The history is provided by the patient.     Review of patient's allergies indicates:  No Known Allergies  Past Medical History:   Diagnosis Date    Anemia     Anxiety     Chronic pain syndrome 7/13/2011    CKD (chronic kidney disease), stage III     Diabetes mellitus type II, uncontrolled     Discitis of lumbosacral region 1/16/2015    ED (erectile dysfunction)     Encounter for blood transfusion     Genital herpes     Gout, arthritis     History of alcohol abuse     History of hepatitis C, s/p successful Rx w/ SVR24 (cure)  - 5/2018 8/23/2011    Completed 24wks Epclusa + RBV w/SVR12 - 2/2018  -     History of positive PPD, treatment status unknown     Pulmonary granulomas, negative sputum cultures for AFB and indeterminate quantferon test    History of substance abuse     Hypertension     Hypothyroidism     Liver replaced by transplant 8/23/2011    DATE: 12/16/2013  LIVER BIOPSY : REASON:  hep C staging  PATHOLOGY COMMITTEE NOTE/PLAN: :grade  1 / stage 1        Pancreatitis 2016    Peptic ulcer disease      Past Surgical History:   Procedure Laterality Date    CHOLECYSTECTOMY      INJECTION OF JOINT Right 12/2/2019    Procedure: INJECTION, JOINT SI;  Surgeon: Thu Penn MD;  Location: [facility] PAIN [facility];  Service: Pain Management;  Laterality: Right;  RT SI JNT INJ    LIVER TRANSPLANT  06/2010    SPINE SURGERY       Family History   Problem Relation Age of Onset    Cancer Mother     Diabetes Mother     Heart disease Mother     Diabetes Sister     Cancer Maternal Uncle 82        colon CA    Melanoma Neg Hx     Psoriasis Neg Hx     Lupus Neg Hx     Eczema Neg Hx     Acne Neg Hx      Social History     Tobacco Use    Smoking status: Former Smoker    Smokeless tobacco: Never Used   Substance Use Topics    Alcohol use: No     Comment: over 5 years ago, none currently    Drug use: No     Review of Systems   Constitutional: Negative for chills and fever.   HENT: Negative for sore throat.    Eyes: Negative for redness.   Respiratory: Negative for shortness of breath.    Cardiovascular: Negative for chest pain.   Gastrointestinal: Positive for abdominal pain, nausea and vomiting. Negative for diarrhea.   Genitourinary: Negative for decreased urine volume, dysuria, frequency, hematuria and urgency.   Musculoskeletal: Negative for back pain.   Skin: Negative for rash.   Neurological: Negative for headaches.       Physical Exam     Initial Vitals [01/23/20 1432]   BP Pulse Resp Temp SpO2   127/64 87 (!) 24 97.5 °F (36.4 °C)  (!) 94 %      MAP       --         Physical Exam    Nursing note and vitals reviewed.  Constitutional: He appears well-developed and well-nourished. He is not diaphoretic. No distress.   HENT:   Head: Normocephalic and atraumatic.   Mouth/Throat: Oropharynx is clear and moist.   Eyes: Conjunctivae and EOM are normal.   Neck: Normal range of motion. Neck supple.   Cardiovascular: Normal rate, regular rhythm and normal heart sounds. Exam reveals no gallop and no friction rub.    No murmur heard.  Pulmonary/Chest: Breath sounds normal. He has no wheezes. He has no rhonchi. He has no rales.   Abdominal: Soft. He exhibits distension. There is generalized tenderness. There is no rebound and no guarding.   Musculoskeletal: Normal range of motion. He exhibits edema (1+ pitting edema at bilateral LE). He exhibits no tenderness.   Lymphadenopathy:     He has no cervical adenopathy.   Neurological: He is alert and oriented to person, place, and time. He has normal strength.   Skin: Skin is warm and dry. No rash noted.         ED Course   Procedures  Labs Reviewed   CBC W/ AUTO DIFFERENTIAL - Abnormal; Notable for the following components:       Result Value    Hemoglobin 13.7 (*)     All other components within normal limits   COMPREHENSIVE METABOLIC PANEL - Abnormal; Notable for the following components:    Glucose 252 (*)     Creatinine 1.7 (*)     Calcium 8.4 (*)     eGFR if  49 (*)     eGFR if non  42 (*)     All other components within normal limits   PHOSPHORUS - Abnormal; Notable for the following components:    Phosphorus 2.5 (*)     All other components within normal limits   POCT GLUCOSE - Abnormal; Notable for the following components:    POCT Glucose 274 (*)     All other components within normal limits   POCT GLUCOSE - Abnormal; Notable for the following components:    POCT Glucose 256 (*)     All other components within normal limits   POCT GLUCOSE - Abnormal; Notable for the  following components:    POCT Glucose 157 (*)     All other components within normal limits   MAGNESIUM   POCT GLUCOSE MONITORING CONTINUOUS            X-Rays:   Independently Interpreted Readings:   Other Readings:  Reviewed by myself, read by radiology.     Imaging Results          X-Ray Abdomen AP 1 View (Final result)  Result time 01/23/20 22:10:02    Final result by Viridiana Lau MD (01/23/20 22:10:02)                 Impression:      Tip of the feeding tube gastric fundus.      Electronically signed by: Viridiana Lau  Date:    01/23/2020  Time:    22:10             Narrative:    EXAMINATION:  ABDOMEN AP    CLINICAL HISTORY:  for NG placement;    TECHNIQUE:  Abdomen AP one view    COMPARISON:  10/22/2010    FINDINGS:  The tip of the feeding tube is seen in the gastric fundus.Single AP view of the abdomen demonstrates a nonspecific bowel gas pattern. No dilated loops small bowel or air-fluid levels are detected.  There are surgical hardware transfixing L5/S1.                                CT Abdomen Pelvis With Contrast (Final result)  Result time 01/23/20 18:32:37    Final result by Jose Queen MD (01/23/20 18:32:37)                 Impression:      This report was flagged in Epic as abnormal.    1. Prominent inflamed small bowel loops within the left quadrant noting possible transition to decompressed small bowel distally.  Findings may reflect partial small bowel obstruction on the basis of adhesion, with differential including infectious or inflammatory enteritis.  Correlation is advised.  2. Low-attenuation of the hepatic allograft, correlation with LFTs advised.  Biliary prominence appears stable.  3. Possible left pulmonary nodules.  For multiple solid nodules all <6 mm, Fleischner Society 2017 guidelines recommend no routine follow up for a low risk patient, or follow up with non-contrast chest CT at 12 months after discovery in a high risk patient.  4. Several additional findings  above.      Electronically signed by: Jose Queen MD  Date:    01/23/2020  Time:    18:32             Narrative:    EXAMINATION:  CT ABDOMEN PELVIS WITH CONTRAST    CLINICAL HISTORY:  Abdominal distension;    TECHNIQUE:  Low dose axial images, sagittal and coronal reformations were obtained from the lung bases to the pubic symphysis following the IV administration of 100 mL of Omnipaque 350 and the oral administration of 30 mL of Omnipaque 350.    COMPARISON:  07/10/2019    FINDINGS:  Images of the lower thorax are remarkable for bilateral dependent atelectasis/scarring.  There is a calcified granuloma within the right lower lobe.  There is a possible pulmonary nodule versus atelectasis, atelectasis favored, within the periphery of the left lower lobe.  This region measures approximately 0.3 cm.  An additional focus is noted also measuring approximately 0.3 cm.  There is slight eventration of left and right hemidiaphragms.    Surgical change noted of hepatic transplantation.  The liver is hypoattenuating, suggesting steatosis, correlation with LFTs recommended.  There is intrahepatic biliary dilation without significant extrahepatic dilation.  The pancreatic duct is not dilated.  Splenic granulomas noted.  The portal vein, splenic vein, SMV, celiac axis and SMA all are patent.  No significant abdominal lymphadenopathy noting a few upper limit of normal caliber abdominal lymph nodes.  There is trace fluid adjacent to the inferior right hepatic margin.    The kidneys enhance symmetrically without hydronephrosis.  There is right nonobstructive nephrolithiasis versus renal vascular calcification, no left nephrolithiasis.  The bilateral ureters are unremarkable without calculi seen.  The urinary bladder is unremarkable.  The prostate is not enlarged.    There is scattered abdominopelvic ascites, mild.  There are a few scattered colonic diverticula without convincing surrounding inflammation to suggest diverticulitis.   The terminal ileum is unremarkable.  The appendix is not confidently identified, no pericecal inflammation.  The proximal small bowel is unremarkable.  There are a few mildly distended mid to distal small bowel loops in the right quadrant, fluid-filled, with associated surrounding edema.  There is transition to relatively decompressed distal small bowel, findings are concerning for at least partial small bowel obstruction, possibly on the basis of adhesion, with differential including infectious or inflammatory enteritis.  No findings to suggest high-grade obstruction, pneumatosis, or free air.  There is atherosclerotic calcification of the aorta and its branches.    There is osteopenia.  Degenerative changes are noted of the spine.  Surgical changes are noted of the lumbar spine.  Spinal fusion hardware spans L4-L5 noting grade 1 anterolisthesis of L4 on L5.  No significant inguinal lymphadenopathy.                              Medical Decision Making:   History:   Old Medical Records: I decided to obtain old medical records.  Initial Assessment:   Pt is a 64 y.o. male with PMHx of gout, HTN, hypothyroidism, hx of EtOH abuse, hx of substance abuse, anemia, ED, PPD with pulmonary granulomas and negative sputum cultures, genital herpes, CPS, anxiety, discitis of lumbosacral region, DM, CKD, liver transplant, hsx of Hep C s/p successful Rx with SVR24, and pancreatitis, who presents with c/o abdominal pain/N/V that began this morning. VSSWNL except respiratory rate 24 bpm.  Differential Diagnosis:   Ddx includes but is not limited to:   , gallstones, pancreatitis, hepatitis, cholangitis, appendicitis, diverticulitis, GERD, gastroenteritis, UTI , sbo    Clinical Tests:   Lab Tests: Reviewed and Ordered  Radiological Study: Reviewed and Ordered  ED Management:  Plan: obtain cbc, cmp, lipase, urine analgesia, CT scan and reassess.     Pt signed out to Dr. Lowery pending CT Scan.   6:30pm                  ED Course as of  Jan 24 1148   Thu Jan 23, 2020 1914 Case discussed with Dr. Snow from Hepatology regarding transfer to [facility] who stated that they can either be admitted to [facility] or [facility] for observation. Agreed to start on immunosuppressants as needed.     [SP]   1925 Case discussed with Dr. Ibarra from [facility] general surgery who states they will admit the patient to the hospital.    [SP]   1937 Spoke with Dr. Ibarra who discussed with his transplant team about admitting to [facility].     [SP]   2005 Spoke with Dr. Ibarra who discussed with transplant team and confirmed recommendation to transfer to [facility].    [SP]      ED Course User Index  [SP] Trudy Parker                Clinical Impression:       ICD-10-CM ICD-9-CM   1. Abdominal pain, unspecified abdominal location R10.9 789.00   2. SBO (small bowel obstruction) K56.609 560.9   3. Immunosuppressed status D89.9 279.9   4. S/P liver transplant Z94.4 V42.7                    I, Kameron Simon,  personally performed the services described in this documentation. All medical record entries made by the scribe were at my direction and in my presence.  I have reviewed the chart and agree that the record reflects my personal performance and is accurate and complete. Kameron Simon Jr., MD  01/24/20 1145       Kameron Simon Jr., MD  01/24/20 115     70.8

## 2024-09-25 RX ORDER — OXYCODONE AND ACETAMINOPHEN 10; 325 MG/1; MG/1
1 TABLET ORAL EVERY 4 HOURS PRN
Qty: 42 TABLET | Refills: 0 | Status: SHIPPED | OUTPATIENT
Start: 2024-09-25 | End: 2024-10-04

## 2024-10-01 RX ORDER — ALLOPURINOL 100 MG/1
TABLET ORAL
Qty: 180 TABLET | Refills: 0 | Status: SHIPPED | OUTPATIENT
Start: 2024-10-01

## 2024-10-01 RX ORDER — TRAZODONE HYDROCHLORIDE 50 MG/1
50 TABLET ORAL NIGHTLY
Qty: 90 TABLET | Refills: 3 | Status: SHIPPED | OUTPATIENT
Start: 2024-10-01

## 2024-10-01 RX ORDER — LEVOTHYROXINE SODIUM 88 UG/1
TABLET ORAL
Qty: 90 TABLET | Refills: 3 | Status: SHIPPED | OUTPATIENT
Start: 2024-10-01

## 2024-10-01 RX ORDER — LISINOPRIL 20 MG/1
20 TABLET ORAL
Qty: 90 TABLET | Refills: 3 | Status: SHIPPED | OUTPATIENT
Start: 2024-10-01

## 2024-10-01 RX ORDER — OXYCODONE AND ACETAMINOPHEN 10; 325 MG/1; MG/1
1 TABLET ORAL EVERY 4 HOURS PRN
Qty: 42 TABLET | Refills: 0 | Status: SHIPPED | OUTPATIENT
Start: 2024-10-01 | End: 2024-10-08

## 2024-10-01 NOTE — TELEPHONE ENCOUNTER
No care due was identified.  [facility] Embedded Care Due Messages. Reference number: [id number].   10/01/2024 12:12:15 PM CDT

## 2024-10-01 NOTE — TELEPHONE ENCOUNTER
Returned pt's call, pt stated that he is hurting a lot and he needs his pain medication. I stated to pt that I can send the refill request to Paige as well to see if she will sign off on it. Pt voiced understanding
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
## 2024-10-02 NOTE — TELEPHONE ENCOUNTER
Refill Routing Note   Medication(s) are not appropriate for processing by [facility] for the following reason(s):        Required labs outdated    [facility] action(s):  Defer  Approve               Appointments  past 12m or future 3m with PCP    Date Provider   Last Visit   8/26/2024 Emanuel Lopez MD   Next Visit   2/24/2025 Emanuel Lopez MD   ED visits in past 90 days: 0        Note composed:9:34 PM 10/01/2024           <-- Click to add NO significant Past Surgical History

## 2024-10-07 RX ORDER — OXYCODONE AND ACETAMINOPHEN 10; 325 MG/1; MG/1
1 TABLET ORAL EVERY 4 HOURS PRN
Qty: 42 TABLET | Refills: 0 | Status: SHIPPED | OUTPATIENT
Start: 2024-10-07 | End: 2024-10-14

## 2024-10-14 RX ORDER — OXYCODONE AND ACETAMINOPHEN 10; 325 MG/1; MG/1
1 TABLET ORAL EVERY 4 HOURS PRN
Qty: 42 TABLET | Refills: 0 | Status: SHIPPED | OUTPATIENT
Start: 2024-10-14 | End: 2024-10-21

## 2024-10-14 NOTE — TELEPHONE ENCOUNTER
Patient stated that he need a refill on his medication. I stated to him that I will send it to [facility]. Patient voiced understanding.      Patient called again wanting his prescription. I stated to him that we are waiting on [facility] to sign off on it.

## 2024-10-15 RX ORDER — OXYCODONE AND ACETAMINOPHEN 10; 325 MG/1; MG/1
1 TABLET ORAL EVERY 4 HOURS PRN
Qty: 42 TABLET | Refills: 0 | OUTPATIENT
Start: 2024-10-15 | End: 2024-10-22

## 2024-10-17 ENCOUNTER — TELEPHONE (OUTPATIENT)
Dept: NEUROSURGERY | Facility: CLINIC | Age: 69
End: 2024-10-17

## 2024-10-17 ENCOUNTER — OFFICE VISIT (OUTPATIENT)
Dept: NEUROSURGERY | Facility: CLINIC | Age: 69
End: 2024-10-17
Payer: MEDICARE

## 2024-10-17 ENCOUNTER — HOSPITAL ENCOUNTER (OUTPATIENT)
Dept: RADIOLOGY | Facility: HOSPITAL | Age: 69
Discharge: HOME | End: 2024-10-17
Attending: NEUROLOGICAL SURGERY
Payer: MEDICARE

## 2024-10-17 VITALS
HEART RATE: 88 BPM | SYSTOLIC BLOOD PRESSURE: 160 MMHG | DIASTOLIC BLOOD PRESSURE: 90 MMHG | BODY MASS INDEX: 34.15 KG/M2 | WEIGHT: 266.13 LBS | HEIGHT: 74 IN

## 2024-10-17 DIAGNOSIS — Z96.89 S/P INSERTION OF SPINAL CORD STIMULATOR: Primary | ICD-10-CM

## 2024-10-17 DIAGNOSIS — G56.42 COMPLEX REGIONAL PAIN SYNDROME TYPE 2 OF LEFT UPPER EXTREMITY: ICD-10-CM

## 2024-10-17 PROCEDURE — 99024 POSTOP FOLLOW-UP VISIT: CPT | Mod: S$GLB,,, | Performed by: PHYSICIAN ASSISTANT

## 2024-10-17 PROCEDURE — 4010F ACE/ARB THERAPY RXD/TAKEN: CPT | Mod: CPTII,S$GLB,, | Performed by: PHYSICIAN ASSISTANT

## 2024-10-17 PROCEDURE — 1159F MED LIST DOCD IN RCRD: CPT | Mod: CPTII,S$GLB,, | Performed by: PHYSICIAN ASSISTANT

## 2024-10-17 PROCEDURE — 3080F DIAST BP >= 90 MM HG: CPT | Mod: CPTII,S$GLB,, | Performed by: PHYSICIAN ASSISTANT

## 2024-10-17 PROCEDURE — 3072F LOW RISK FOR RETINOPATHY: CPT | Mod: CPTII,S$GLB,, | Performed by: PHYSICIAN ASSISTANT

## 2024-10-17 PROCEDURE — 99999 PR PBB SHADOW E&M-EST. PATIENT-LVL V: CPT | Mod: PBBFAC,,, | Performed by: PHYSICIAN ASSISTANT

## 2024-10-17 PROCEDURE — 72040 X-RAY EXAM NECK SPINE 2-3 VW: CPT | Mod: TC,FY

## 2024-10-17 PROCEDURE — 1101F PT FALLS ASSESS-DOCD LE1/YR: CPT | Mod: CPTII,S$GLB,, | Performed by: PHYSICIAN ASSISTANT

## 2024-10-17 PROCEDURE — 1125F AMNT PAIN NOTED PAIN PRSNT: CPT | Mod: CPTII,S$GLB,, | Performed by: PHYSICIAN ASSISTANT

## 2024-10-17 PROCEDURE — 3077F SYST BP >= 140 MM HG: CPT | Mod: CPTII,S$GLB,, | Performed by: PHYSICIAN ASSISTANT

## 2024-10-17 PROCEDURE — 3288F FALL RISK ASSESSMENT DOCD: CPT | Mod: CPTII,S$GLB,, | Performed by: PHYSICIAN ASSISTANT

## 2024-10-17 PROCEDURE — 3044F HG A1C LEVEL LT 7.0%: CPT | Mod: CPTII,S$GLB,, | Performed by: PHYSICIAN ASSISTANT

## 2024-10-17 PROCEDURE — 1160F RVW MEDS BY RX/DR IN RCRD: CPT | Mod: CPTII,S$GLB,, | Performed by: PHYSICIAN ASSISTANT

## 2024-10-17 PROCEDURE — 72040 X-RAY EXAM NECK SPINE 2-3 VW: CPT | Mod: 26,,, | Performed by: STUDENT IN AN ORGANIZED HEALTH CARE EDUCATION/TRAINING PROGRAM

## 2024-10-17 NOTE — PROGRESS NOTES
"  Subjective:     Patient ID:  Vick Gonzalez is a 68 y.o. male.    [place]    Chief Complaint: 6 weeks po    Date of surgery 09/03/2024     Preop diagnosis   1. Chronic pain syndrome   2. Complex regional pain syndrome type 2 left upper extremity     Postop diagnosis   Same     Surgery   1. Placement of spinal cord stimulator pulse generator, left lumbar area, [employer] Eterna   2. Removal of existing temporary extensions     Surgeon   Khadar Payne MD             HPI    Vick Gonzalez is a 68 y.o. male who presents for follow up.  He states he has 100% relief of his left hand pain.  He just feels some stiffness in the left hand.  No radiating arm pain or paresthesias.  He still has neck pain that goes the back of his head.  He is still taking Percocet.    Patient denies any recent accidents or trauma, no saddle anesthesias, and no bowel or bladder incontinence.    Review of Systems:  Constitution: Negative for chills, fever, night sweats and weight loss.   Musculoskeletal: Negative for falls.   Gastrointestinal: Negative for bowel incontinence, nausea and vomiting.   Genitourinary: Negative for bladder incontinence.   Neurological: Negative for disturbances in coordination and loss of balance.      Objective:      Vitals:    10/17/24 1336   BP: (!) 160/90   Pulse: 88   Weight: 120.7 kg (266 lb 1.5 oz)   Height: 6' 2" (1.88 m)   PainSc:   6   PainLoc: Back         Physical Exam:      General:  Vick Gonzalez is well-developed, well-nourished, appears stated age, in no acute distress, alert and oriented to person, place, and time.    Pulmonary/Chest:  Respiratory effort normal  Abdominal: Exhibits no distension  Psychiatric:  Normal mood and affect.  Behavior is normal.  Judgement and thought content normal      Musculoskeletal:      Cervical Spine Inspection:  Healed posterior surgical scars with no visible rashes.    Left low back with sutures still showing      Neurological:    Muscle strength against " resistance:     Right Left   Deltoid  4 / 5 5 / 5   Biceps  5 / 5 5 / 5   Triceps 5 / 5 5 / 5   Wrist flexion  5 / 5 5 / 5   Wrist extension 5 / 5 5 / 5   Finger abduction 5 / 5 5 / 5   Thumb opposition 5 / 5 5 / 5   Handgrip 5 / 5 5 / 5   Hip flexion  4 / 5 4 / 5   Hip extension 5 / 5 5 / 5   Hip abduction 5 / 5 5 / 5   Hip adduction 5/ 5 5 / 5   Knee extension  5 / 5 5 / 5   Knee flexion  5 / 5 5 / 5   Dorsiflexion  5 / 5 5 / 5   EHL  5 / 5 5 / 5   Plantar flexion  5 / 5 5 / 5   Inversion of the feet 5 / 5 5 / 5   Eversion of the feet 5 / 5 5 / 5       Reflexes:     Right Left   Triceps 2+ 2+   Biceps 2+ 2+   Brachioradialis 2+ 2+   Patellar 3+ 3+   Achilles 3+ 3+       Clonus:  Negative bilaterally  Castro: Negative on the left. Positive no the right    On gross examination of the bilateral lower extremities, patient has full painfree ROM with no signs of clubbing, cyanosis, edema, and weakness.      XRAY Interpretation:     Cervical spine xrays were personally reviewed today.  No fractures.  SCS lead in place      Assessment:          1. S/P insertion of spinal cord stimulator            Plan:               Patient doing well with no left hand pain  FU with Dr. Payne in 6 weeks for 3 months po fu with cspine xrays    Follow-Up:  Follow up in about 6 weeks (around 11/28/2024). If there are any questions prior to this, the patient was instructed to contact the office.       Paige Gilbert, [facility], PA-C  Neurosurgery  [facility]  10/17/2024

## 2024-10-21 ENCOUNTER — PATIENT OUTREACH (OUTPATIENT)
Dept: EMERGENCY MEDICINE | Facility: HOSPITAL | Age: 69
End: 2024-10-21
Payer: MEDICARE

## 2024-10-21 RX ORDER — OXYCODONE AND ACETAMINOPHEN 10; 325 MG/1; MG/1
1 TABLET ORAL EVERY 4 HOURS PRN
Qty: 42 TABLET | Refills: 0 | Status: SHIPPED | OUTPATIENT
Start: 2024-10-21 | End: 2024-10-28

## 2024-10-21 NOTE — PROGRESS NOTES
Successful second follow up message to patient. Advised if he needs assistance with anything to give me a call. Patient last er visit was 7/1/2024. Navigator to close encounter.    RADHA English  Ed Navigator

## 2024-10-24 ENCOUNTER — OFFICE VISIT (OUTPATIENT)
Dept: OTOLARYNGOLOGY | Facility: CLINIC | Age: 69
End: 2024-10-24
Payer: MEDICARE

## 2024-10-24 ENCOUNTER — CLINICAL SUPPORT (OUTPATIENT)
Dept: OTOLARYNGOLOGY | Facility: CLINIC | Age: 69
End: 2024-10-24
Payer: MEDICARE

## 2024-10-24 VITALS
HEART RATE: 86 BPM | BODY MASS INDEX: 34.26 KG/M2 | SYSTOLIC BLOOD PRESSURE: 155 MMHG | WEIGHT: 266.88 LBS | DIASTOLIC BLOOD PRESSURE: 87 MMHG

## 2024-10-24 DIAGNOSIS — R42 DISEQUILIBRIUM: ICD-10-CM

## 2024-10-24 DIAGNOSIS — R42 DIZZINESS: ICD-10-CM

## 2024-10-24 DIAGNOSIS — R26.89 IMBALANCE: Primary | ICD-10-CM

## 2024-10-24 DIAGNOSIS — H90.3 SENSORINEURAL HEARING LOSS (SNHL) OF BOTH EARS: Primary | ICD-10-CM

## 2024-10-24 DIAGNOSIS — H90.3 SENSORINEURAL HEARING LOSS (SNHL) OF BOTH EARS: ICD-10-CM

## 2024-10-24 PROCEDURE — 92557 COMPREHENSIVE HEARING TEST: CPT | Mod: S$GLB,,, | Performed by: PHYSICIAN ASSISTANT

## 2024-10-24 PROCEDURE — 92567 TYMPANOMETRY: CPT | Mod: S$GLB,,, | Performed by: PHYSICIAN ASSISTANT

## 2024-10-24 PROCEDURE — 99999 PR PBB SHADOW E&M-EST. PATIENT-LVL V: CPT | Mod: PBBFAC,,, | Performed by: NURSE PRACTITIONER

## 2024-10-24 PROCEDURE — 99999 PR PBB SHADOW E&M-EST. PATIENT-LVL I: CPT | Mod: PBBFAC,,, | Performed by: PHYSICIAN ASSISTANT

## 2024-10-24 NOTE — PROGRESS NOTES
Vick Gonzalez, a 68 y.o. male, was seen today in the clinic for an audiologic evaluation.  Patients main complaint was dizziness which he has been suffering with for many years. He reports feeling 'off balance' throughout the day and the symptoms are worse when he is bending over. He denies ear pain or ear drainage.  He reports intermittent tinnitus.  He denies a family history of hearing loss or a history of significant noise exposure.     Audiogram results revealed a mild sensorineural hearing loss bilaterally.  Speech reception thresholds were noted at 10 dB in the right ear and 5 dB in the left ear.  Speech discrimination scores were 96% in the right ear and 96% in the left ear.  Tympanometry revealed Type A in the right ear and Type A in the left ear. The results of the audiogram were reviewed with the patient.    Recommendations:  Otologic evaluation  Annual audiogram  Hearing protection when in noise  Hearing aid consultation when patient is ready.

## 2024-10-24 NOTE — PROGRESS NOTES
Chief Complaint   Patient presents with    Dizziness     Feeling of being off balance        HPI:   The patient who is referred to me by Dr. Emanuel Lopez in consultation for evaluation of dizziness. The symptoms started several years ago and have gradually worsened. The sensation is also described as: off balance.The attacks occur intermittently and last a few seconds. Positions that worsen symptoms: bending over.  Associated ear symptoms: none. Associated CNS symptoms: none. Recent infections: none. Head trauma: denied. Drug ingestion prior to onset: none. Noise exposure: no occupational exposure.Previous workup: none. Previous treatment includes: no medications to date  Response to this treatment has been   Patient denies a history of migraine headaches.    Past Medical History:   Diagnosis Date    Acute congestive heart failure 5/12/2023    Anemia     Anxiety     Cataract     Chronic pain syndrome 07/13/2011    CKD (chronic kidney disease), stage III     Coronary artery disease involving native coronary artery of native heart with angina pectoris 8/24/2023    Diabetes mellitus type II, uncontrolled     Discitis of lumbosacral region 01/16/2015    ED (erectile dysfunction)     Encounter for blood transfusion     Genital herpes     Gout, arthritis     History of alcohol abuse     History of hepatitis C, s/p successful Rx w/ SVR24 (cure) - 5/2018 08/23/2011    Completed 24wks Epclusa + RBV w/SVR12 - 2/2018  -     History of positive PPD, treatment status unknown     Pulmonary granulomas, negative sputum cultures for AFB and indeterminate quantferon test    History of substance abuse     Hypertension     Hypothyroidism     Liver replaced by transplant 08/23/2011    DATE: 12/16/2013  LIVER BIOPSY : REASON:  hep C staging  PATHOLOGY COMMITTEE NOTE/PLAN: :grade  1 / stage 1        Pancreatitis 2016    Peptic ulcer disease     Pulmonary emphysema, unspecified emphysema type 5/26/2023     Social History      Socioeconomic History    Marital status:    Tobacco Use    Smoking status: Former     Passive exposure: Never    Smokeless tobacco: Never   Substance and Sexual Activity    Alcohol use: No     Comment: over 5 years ago, none currently    Drug use: Not Currently     Comment: Former cocaine use    Sexual activity: Yes     Social Drivers of Health     Financial Resource Strain: Low Risk  (8/30/2024)    Overall Financial Resource Strain (CARDIA)     Difficulty of Paying Living Expenses: Not hard at all   Food Insecurity: No Food Insecurity (8/30/2024)    Hunger Vital Sign     Worried About Running Out of Food in the Last Year: Never true     Ran Out of Food in the Last Year: Never true   Transportation Needs: No Transportation Needs (8/30/2024)    TRANSPORTATION NEEDS     Transportation : No   Physical Activity: Sufficiently Active (8/30/2024)    Exercise Vital Sign     Days of Exercise per Week: 7 days     Minutes of Exercise per Session: 30 min   Recent Concern: Physical Activity - Insufficiently Active (8/22/2024)    Exercise Vital Sign     Days of Exercise per Week: 2 days     Minutes of Exercise per Session: 20 min   Stress: No Stress Concern Present (8/30/2024)    [employer] - Occupational Stress Questionnaire     Feeling of Stress : Not at all   Recent Concern: Stress - Stress Concern Present (8/22/2024)    [employer] - Occupational Stress Questionnaire     Feeling of Stress : To some extent   Housing Stability: Low Risk  (8/30/2024)    Housing Stability Vital Sign     Unable to Pay for Housing in the Last Year: No     Homeless in the Last Year: No     Past Surgical History:   Procedure Laterality Date    ANTERIOR CERVICAL DISCECTOMY W/ FUSION N/A 8/22/2022    Procedure: Procedure: ACDF 4-7 LOS: 4.0 Anesthesia: General Blood: Type & Screen Radiology: C-Arm Microscope: ------- SNS: EMG, SEP, MEP Brace: [place] Bed: Regular Bed, Shoulder Strap  Headrest:------ Position: Supine Equipment: Depuy;  Surgeon: Titi Christian MD;  Location: [facility] OR;  Service: Neurosurgery;  Laterality: N/A;  Depuy  confirmed CW 8/19  Neuro monitoring confirmed CW 8/19    CARPAL TUNNEL RELEASE Left 10/29/2021    Procedure: RELEASE, CARPAL TUNNEL;  Surgeon: Jameson Alejo Jr., MD;  Location: [facility] OR;  Service: Orthopedics;  Laterality: Left;    CHOLECYSTECTOMY      CORONARY ANGIOGRAPHY N/A 8/2/2023    Procedure: ANGIOGRAM, CORONARY ARTERY;  Surgeon: Juan Vera MD;  Location: [facility] CATH LAB/EP;  Service: Cardiology;  Laterality: N/A;    DECOMPRESSION OF CERVICAL SPINE BY ANTERIOR APPROACH WITH FUSION  8/22/2022    Procedure: DECOMPRESSION AND FUSION, SPINE, CERVICAL, ANTERIOR APPROACH;  Surgeon: Titi Christian MD;  Location: [facility] OR;  Service: Neurosurgery;;    INJECTION OF JOINT Right 12/2/2019    Procedure: INJECTION, JOINT SI;  Surgeon: Thu Penn MD;  Location: [facility] PAIN MGT;  Service: Pain Management;  Laterality: Right;  RT SI JNT INJ    INSERTION OF SPINAL NEUROSTIMULATOR N/A 9/3/2024    Procedure: INSERTION, NEUROSTIMULATOR, SPINAL;  Surgeon: Khadar Payne MD;  Location: [facility] OR;  Service: Neurosurgery;  Laterality: N/A;  Procedure:C1-2 generator placement  Length of procedure:1.5 hours  LOS: 0-1 nights  Anesthesia:General  Blood:Type and screen  Radiology:C-arm  Bed:Regular Bed  Headrest:[place]  Position:Prone  Equipment:Kassandra Bob    LAMINECTOMY, SPINE, FOR NEUROSTIMULATOR ELECTRODE INSERTION N/A 8/29/2024    Procedure: LAMINECTOMY, SPINE, FOR NEUROSTIMULATOR ELECTRODE INSERTION;  Surgeon: Khadar Payne MD;  Location: [facility] OR;  Service: Neurosurgery;  Laterality: N/A;  Procedure: C1-2 laminectomy for paddle lead placement for spinal cord stimulator trial, extension   Length of procedure: 2 hours  LOS: 0 nights  Anesthesia:General  Blood:Type and screen  Radiology:C-arm  SNS:EMG,SEP,MEP  Position:Pro    LEFT HEART CATHETERIZATION Left 8/2/2023     Procedure: Left heart cath;  Surgeon: Juan Vera MD;  Location: [facility] CATH LAB/EP;  Service: Cardiology;  Laterality: Left;    LIVER TRANSPLANT  06/2010    SPINE SURGERY      TRANSFORAMINAL EPIDURAL INJECTION OF STEROID Left 5/29/2023    Procedure: INJECTION, STEROID, EPIDURAL, TRANSFORAMINAL APPROACH,  LEFT C7-T1 DIRECT REF;  Surgeon: Thu Penn MD;  Location: [facility] PAIN MGT;  Service: Pain Management;  Laterality: Left;  4/3 MD ILL/PT WILL C/B     Family History   Problem Relation Name Age of Onset    Cancer Mother      Diabetes Mother      Heart disease Mother      Diabetes Sister      Cancer Maternal Uncle  82        colon CA    Drug abuse Daughter      Melanoma Neg Hx      Psoriasis Neg Hx      Lupus Neg Hx      Eczema Neg Hx      Acne Neg Hx             Review of Systems  General: negative for chills, fever or weight loss  Psychological: negative for mood changes or depression  Ophthalmic: negative for blurry vision, photophobia or eye pain  ENT: see HPI  Respiratory: no cough, shortness of breath, or wheezing  Cardiovascular: no chest pain or dyspnea on exertion  Gastrointestinal: no abdominal pain, change in bowel habits, or black/ bloody stools  Musculoskeletal: negative for gait disturbance or muscular weakness  Neurological: no syncope or seizures; no ataxia  Dermatological: negative for pruritis,  rash and jaundice  Hematologic/lymphatic: no easy bruising, no new adenopathy    Physical Exam:    Vitals:    10/24/24 0840   BP: (!) 155/87   Pulse: 86       Constitutional: Well appearing / communicating without difficutly.  NAD.  Eyes: EOM I Bilaterally  Head/Face: Normocephalic.  Negative paranasal sinus pressure/tenderness.  Salivary glands WNL.  House Brackmann I Bilaterally.    Right Ear: Auricle normal appearance. External Auditory Canal within normal limits no lesions or masses,TM w/o masses/lesions/perforations. TM mobility noted.   Left Ear: Auricle normal appearance. External Auditory Canal  within normal limits no lesions or masses,TM w/o masses/lesions/perforations. TM mobility noted.  Vestibular exam: unable to assess Fukuda step test; positive Romberg; negative Right Dixx- Hallpike; negative left Dixx- Hallpike  Nose: No gross nasal septal deviation. Inferior Turbinates 3+ bilaterally. No septal perforation. No masses/lesions. External nasal skin appears normal without masses/lesions.  Oral Cavity: Gingiva/lips within normal limits.  Dentition/gingiva healthy appearing. Mucus membranes moist. Floor of mouth soft, no masses palpated. Oral Tongue mobile. Hard Palate appears normal.    Oropharynx: Base of tongue appears normal. No masses/lesions noted. Tonsillar fossa/pharyngeal wall without lesions. Posterior oropharynx WNL.  Soft palate without masses. Midline uvula.   Neck/Lymphatic: No LAD I-VI bilaterally.  No thyromegaly.  No masses noted on exam.    Mirror laryngoscopy/nasopharyngoscopy: Active gag reflex.  Unable to perform.    Neuro/Psychiatric: AOx3.  Normal mood and affect.   Cardiovascular: Normal carotid pulses bilaterally, no increasing jugular venous distention noted at cervical region bilaterally.    Respiratory: Normal respiratory effort, no stridor, no retractions noted.      Audiogram: Reviewed and interpreted by me, and discussed with the patient today.    Audiogram results revealed a mild sensorineural hearing loss bilaterally.  Speech reception thresholds were noted at 10 dB in the right ear and 5 dB in the left ear.  Speech discrimination scores were 96% in the right ear and 96% in the left ear.  Tympanometry revealed Type A in the right ear and Type A in the left ear.       Assessment:    ICD-10-CM ICD-9-CM    1. Imbalance  R26.89 781.2 Ambulatory referral/consult to ENT      CT Temporal Bone without contrast      Ambulatory referral/consult to Physical/Occupational Therapy      2. Disequilibrium  R42 780.4 CT Temporal Bone without contrast      Ambulatory referral/consult to  Physical/Occupational Therapy      3. Sensorineural hearing loss (SNHL) of both ears  H90.3 389.18         The primary encounter diagnosis was Imbalance. Diagnoses of Disequilibrium and Sensorineural hearing loss (SNHL) of both ears were also pertinent to this visit.      Plan:  Orders Placed This Encounter   Procedures    CT Temporal Bone without contrast    Ambulatory referral/consult to Physical/Occupational Therapy       -I had a long discussion with the patient regarding their symptoms.  Dizziness, vertigo and disequilibrium are common symptoms reported by adults during visits to their doctors. Discussed various factors related to dizziness and imbalance including: age-related changes, cardiovascular, orthopedic, vestibular or neurologic. Common disorders of the inner ear include: Meniere's disease, vestibular neuritis, labyrinthitis, and benign paroxysmal positional vertigo (BPPV).  Based on his HPI, I suspect disequilibrium related to age-related changes and orthopedic.   -ordered CT temporal to rule out retrocochlear pathologies. He's unable to get an MRI  - [place]-Hallpike was negative bilaterally and otoscopic exam was benign.   -will refer to vestibular therapy      -We reviewed the patient's recent audiogram and hearing loss in detail.   We also discussed the use hearing protection when exposed to loud noise, including lawn equipment. Recommend audiogram in 1 year.    -follow up 1 year or sooner as needed    Jenna Fields NP        Answers submitted by the patient for this visit:  Review of Symptoms Questionnaire  (Submitted on 10/17/2024)  None of these: Yes  None of these: Yes  None of these : Yes  None of these: Yes  Foot swelling?: Yes  None of these: Yes  Muscle aches / pain?: Yes  back pain: Yes  neck pain: Yes  None of these : Yes  None of these: Yes  None of these: Yes  None of these: Yes  None of these: Yes

## 2024-10-27 RX ORDER — OXYCODONE AND ACETAMINOPHEN 10; 325 MG/1; MG/1
1 TABLET ORAL EVERY 4 HOURS PRN
Qty: 42 TABLET | Refills: 0 | Status: SHIPPED | OUTPATIENT
Start: 2024-10-27 | End: 2024-11-01 | Stop reason: SDUPTHER

## 2024-11-01 ENCOUNTER — HOSPITAL ENCOUNTER (OUTPATIENT)
Dept: RADIOLOGY | Facility: HOSPITAL | Age: 69
Discharge: HOME | End: 2024-11-01
Attending: NURSE PRACTITIONER
Payer: MEDICARE

## 2024-11-01 DIAGNOSIS — R42 DISEQUILIBRIUM: ICD-10-CM

## 2024-11-01 DIAGNOSIS — R26.89 IMBALANCE: ICD-10-CM

## 2024-11-01 DIAGNOSIS — G89.4 CHRONIC PAIN SYNDROME: Primary | ICD-10-CM

## 2024-11-01 PROCEDURE — 70480 CT ORBIT/EAR/FOSSA W/O DYE: CPT | Mod: TC

## 2024-11-01 PROCEDURE — 70480 CT ORBIT/EAR/FOSSA W/O DYE: CPT | Mod: 26,,, | Performed by: RADIOLOGY

## 2024-11-01 RX ORDER — OXYCODONE AND ACETAMINOPHEN 10; 325 MG/1; MG/1
1 TABLET ORAL EVERY 8 HOURS PRN
Qty: 45 TABLET | Refills: 0 | Status: SHIPPED | OUTPATIENT
Start: 2024-11-01 | End: 2024-11-16

## 2024-11-08 DIAGNOSIS — G89.4 CHRONIC PAIN SYNDROME: ICD-10-CM

## 2024-11-11 RX ORDER — OXYCODONE AND ACETAMINOPHEN 10; 325 MG/1; MG/1
1 TABLET ORAL EVERY 12 HOURS PRN
Qty: 60 TABLET | Refills: 0 | Status: SHIPPED | OUTPATIENT
Start: 2024-11-11 | End: 2024-11-15 | Stop reason: SDUPTHER

## 2024-11-13 DIAGNOSIS — K74.60 HEPATIC CIRRHOSIS, UNSPECIFIED HEPATIC CIRRHOSIS TYPE, UNSPECIFIED WHETHER ASCITES PRESENT: ICD-10-CM

## 2024-11-13 DIAGNOSIS — Z94.4 S/P LIVER TRANSPLANT: Primary | ICD-10-CM

## 2024-11-14 ENCOUNTER — PATIENT MESSAGE (OUTPATIENT)
Dept: OTOLARYNGOLOGY | Facility: CLINIC | Age: 69
End: 2024-11-14
Payer: MEDICARE

## 2024-11-14 NOTE — TELEPHONE ENCOUNTER
Spoke with the patient over the phone and reviewed CT results with him. CT showed inner ear structures appear within normal limits bilaterally. Patchy mucosal thickening throughout the paranasal sinuses. He denies nasal congestion, runny nose and sinus pressure. Recommended vestibular therapy.

## 2024-11-15 ENCOUNTER — HOSPITAL ENCOUNTER (OUTPATIENT)
Dept: RADIOLOGY | Facility: HOSPITAL | Age: 69
Discharge: HOME | End: 2024-11-15
Attending: INTERNAL MEDICINE
Payer: MEDICARE

## 2024-11-15 DIAGNOSIS — G89.4 CHRONIC PAIN SYNDROME: ICD-10-CM

## 2024-11-15 DIAGNOSIS — Z94.4 S/P LIVER TRANSPLANT: ICD-10-CM

## 2024-11-15 PROCEDURE — 76705 ECHO EXAM OF ABDOMEN: CPT | Mod: TC

## 2024-11-15 RX ORDER — OXYCODONE AND ACETAMINOPHEN 10; 325 MG/1; MG/1
1 TABLET ORAL EVERY 12 HOURS PRN
Qty: 60 TABLET | Refills: 0 | Status: SHIPPED | OUTPATIENT
Start: 2024-11-15 | End: 2024-12-15

## 2024-11-22 ENCOUNTER — TELEPHONE (OUTPATIENT)
Dept: TRANSPLANT | Facility: CLINIC | Age: 69
End: 2024-11-22
Payer: MEDICARE

## 2024-11-22 DIAGNOSIS — K74.60 HEPATIC CIRRHOSIS, UNSPECIFIED HEPATIC CIRRHOSIS TYPE, UNSPECIFIED WHETHER ASCITES PRESENT: ICD-10-CM

## 2024-11-22 DIAGNOSIS — Z94.4 S/P LIVER TRANSPLANT: Primary | ICD-10-CM

## 2024-11-22 DIAGNOSIS — G89.4 CHRONIC PAIN SYNDROME: ICD-10-CM

## 2024-11-22 NOTE — TELEPHONE ENCOUNTER
Letter sent, labs stable and no medication changes are needed. Repeat labs due 2/24/25 per protocol. Pt also informed that his ultrasound is stable. Next due 5/2025.  ----- Message from James Coleman MD sent at 11/22/2024 12:09 PM CST -----  Results reviewed

## 2024-11-22 NOTE — LETTER
November 25, 2024    Vick Gonzalez  [address] [address]          Dear Vick Carlos:  MRN: [id number]    This is a follow up to your recent labs, your lab results were stable.  There are no medicine changes.  Please have your labs drawn again on 2/24/25.    Dr. Coleman also said your liver ultrasound was stable. Next ultrasound will be due in May 2025.      If you cannot have your labs drawn on the scheduled date, it is your responsibility to call the transplant department to reschedule.  Please call [phone] and ask to speak to [facility]  for all scheduling requests.     Sincerely,    Kandy  Your Liver Transplant Coordinator    [facility]  [address] [place] [id number] [phone]

## 2024-11-25 RX ORDER — OXYCODONE AND ACETAMINOPHEN 10; 325 MG/1; MG/1
1 TABLET ORAL EVERY 12 HOURS PRN
Qty: 60 TABLET | Refills: 0 | Status: SHIPPED | OUTPATIENT
Start: 2024-11-25 | End: 2024-12-25

## 2024-12-09 ENCOUNTER — OFFICE VISIT (OUTPATIENT)
Dept: NEUROSURGERY | Facility: CLINIC | Age: 69
End: 2024-12-09
Payer: MEDICARE

## 2024-12-09 ENCOUNTER — HOSPITAL ENCOUNTER (OUTPATIENT)
Dept: RADIOLOGY | Facility: HOSPITAL | Age: 69
Discharge: HOME | End: 2024-12-09
Attending: NEUROLOGICAL SURGERY
Payer: MEDICARE

## 2024-12-09 VITALS
HEART RATE: 79 BPM | DIASTOLIC BLOOD PRESSURE: 63 MMHG | SYSTOLIC BLOOD PRESSURE: 107 MMHG | BODY MASS INDEX: 34.27 KG/M2 | HEIGHT: 74 IN | WEIGHT: 267 LBS

## 2024-12-09 DIAGNOSIS — M48.02 SPINAL STENOSIS, CERVICAL REGION: Primary | ICD-10-CM

## 2024-12-09 DIAGNOSIS — Z96.89 SPINAL CORD STIMULATOR STATUS: ICD-10-CM

## 2024-12-09 DIAGNOSIS — G56.42 COMPLEX REGIONAL PAIN SYNDROME TYPE 2 OF LEFT UPPER EXTREMITY: ICD-10-CM

## 2024-12-09 DIAGNOSIS — Z98.1 STATUS POST CERVICAL SPINAL FUSION: ICD-10-CM

## 2024-12-09 DIAGNOSIS — G89.4 CHRONIC PAIN SYNDROME: ICD-10-CM

## 2024-12-09 PROCEDURE — 3078F DIAST BP <80 MM HG: CPT | Mod: CPTII,S$GLB,, | Performed by: NEUROLOGICAL SURGERY

## 2024-12-09 PROCEDURE — 4010F ACE/ARB THERAPY RXD/TAKEN: CPT | Mod: CPTII,S$GLB,, | Performed by: NEUROLOGICAL SURGERY

## 2024-12-09 PROCEDURE — 3072F LOW RISK FOR RETINOPATHY: CPT | Mod: CPTII,S$GLB,, | Performed by: NEUROLOGICAL SURGERY

## 2024-12-09 PROCEDURE — 1159F MED LIST DOCD IN RCRD: CPT | Mod: CPTII,S$GLB,, | Performed by: NEUROLOGICAL SURGERY

## 2024-12-09 PROCEDURE — 99214 OFFICE O/P EST MOD 30 MIN: CPT | Mod: S$GLB,,, | Performed by: NEUROLOGICAL SURGERY

## 2024-12-09 PROCEDURE — 3008F BODY MASS INDEX DOCD: CPT | Mod: CPTII,S$GLB,, | Performed by: NEUROLOGICAL SURGERY

## 2024-12-09 PROCEDURE — 3288F FALL RISK ASSESSMENT DOCD: CPT | Mod: CPTII,S$GLB,, | Performed by: NEUROLOGICAL SURGERY

## 2024-12-09 PROCEDURE — 3074F SYST BP LT 130 MM HG: CPT | Mod: CPTII,S$GLB,, | Performed by: NEUROLOGICAL SURGERY

## 2024-12-09 PROCEDURE — 3044F HG A1C LEVEL LT 7.0%: CPT | Mod: CPTII,S$GLB,, | Performed by: NEUROLOGICAL SURGERY

## 2024-12-09 PROCEDURE — 72040 X-RAY EXAM NECK SPINE 2-3 VW: CPT | Mod: TC,FY

## 2024-12-09 PROCEDURE — 1125F AMNT PAIN NOTED PAIN PRSNT: CPT | Mod: CPTII,S$GLB,, | Performed by: NEUROLOGICAL SURGERY

## 2024-12-09 PROCEDURE — 99999 PR PBB SHADOW E&M-EST. PATIENT-LVL V: CPT | Mod: PBBFAC,,, | Performed by: NEUROLOGICAL SURGERY

## 2024-12-09 PROCEDURE — 1101F PT FALLS ASSESS-DOCD LE1/YR: CPT | Mod: CPTII,S$GLB,, | Performed by: NEUROLOGICAL SURGERY

## 2024-12-09 PROCEDURE — 72040 X-RAY EXAM NECK SPINE 2-3 VW: CPT | Mod: 26,,, | Performed by: STUDENT IN AN ORGANIZED HEALTH CARE EDUCATION/TRAINING PROGRAM

## 2024-12-09 RX ORDER — OXYCODONE AND ACETAMINOPHEN 10; 325 MG/1; MG/1
1 TABLET ORAL EVERY 8 HOURS PRN
Qty: 90 TABLET | Refills: 0 | Status: SHIPPED | OUTPATIENT
Start: 2024-12-09 | End: 2025-01-12

## 2024-12-09 NOTE — PROGRESS NOTES
NEUROSURGICAL PROGRESS NOTE    DATE OF SERVICE:  12/09/2024    ATTENDING PHYSICIAN:  Khadar Payne MD    SUBJECTIVE:    INTERIM HISTORY:    This is a very pleasant 68 y.o. male, status post C4-7 anterior fusion for cervical myelopathy.  He is 3 months status post retrograde placement of paddle lead and pulse generator placement for spinal cord stimulation.  Prior to his spinal cord stimulator he had severe left arm and hand pain.  He reports complete relief of left arm and hand pain since the stimulator is on.  He appears satisfied with his outcome.  However he complains of upper neck pain since his spinal cord stimulator surgery.  He also noticed more upper extremity weakness and balance issues.  He is taking oxycodone 2-3 times daily.  He is on gabapentin 800 mg 3 times daily.  He is walking using a cane.              PAST MEDICAL HISTORY:  Active Ambulatory Problems     Diagnosis Date Noted    Chronic pain syndrome 07/13/2011    Acquired hypothyroidism     History of hepatitis C, s/p successful Rx w/ [id number] (cure) - 5/2018 08/23/2011    Gout, unspecified 03/14/2011    Substance abuse 10/24/2010    Thrombocytopenia 06/16/2010    Anxiety     Lumbar radiculopathy 04/08/2015    Acquired spondylolisthesis 05/12/2015    Essential hypertension 06/19/2015    Type 2 diabetes mellitus with diabetic polyneuropathy, with long-term current use of insulin 10/04/2016    S/P liver transplant 10/04/2016    Hypertriglyceridemia 10/05/2016    CKD (chronic kidney disease), stage III     PAD (peripheral artery disease) 08/30/2017    Erectile dysfunction due to arterial insufficiency 10/09/2017    BPH with urinary obstruction 10/09/2017    Immunosuppressed status 10/16/2017    Low testosterone in male 07/25/2018    Hypertension associated with diabetes 12/01/2020    Hyperlipidemia associated with type 2 diabetes mellitus 12/01/2020    Erectile dysfunction associated with type 2 diabetes mellitus 12/01/2020    Claudication in  peripheral vascular disease 12/01/2020    Aortic atherosclerosis 11/16/2010    Calcified granuloma of lung 10/22/2010    Carpal tunnel syndrome of left wrist 07/13/2021    Acute congestive heart failure 05/12/2023    Alcohol dependence, in remission 05/26/2023    Pulmonary emphysema, unspecified emphysema type 05/26/2023    Coronary artery disease involving native coronary artery of native heart with angina pectoris 08/24/2023    Epistaxis 12/04/2023    Complex regional pain syndrome type 2 of left upper extremity 07/12/2024    Cervical myelopathy 08/26/2024    Hepatic cirrhosis, unspecified hepatic cirrhosis type, unspecified whether ascites present 08/26/2024    Polyneuropathy associated with underlying disease 08/26/2024    S/P insertion of spinal cord stimulator 10/17/2024     Resolved Ambulatory Problems     Diagnosis Date Noted    Abdominal pain, generalized 08/25/2011    Abdominal pain, right upper quadrant 09/16/2010    Abdominal tenderness, right upper quadrant 05/15/2011    Unspecified chronic liver disease without mention of alcohol     Hypertension     Acute alcoholic hepatitis 08/02/2010    Acute bronchitis 03/14/2011    Acute gouty arthropathy 08/28/2011    Aftercare following organ transplant 06/17/2010    Alcoholic cirrhosis of liver 10/11/2010    Anemia of other chronic disease 07/15/2010    Cholangitis 07/15/2010    Chronic hepatitis C with hepatic coma 06/08/2011    Cirrhosis of liver without mention of alcohol 07/28/2011    Complications of transplanted liver 05/11/2011    Dietary surveillance and counseling 07/28/2011    Encephalopathy, unspecified 06/16/2010    Family history of diabetes mellitus 07/15/2010    Hematuria, unspecified 02/11/2012    Hepatomegaly 08/23/2011    Liver replaced by transplant 08/23/2011    Encounter for long-term (current) use of steroids 06/02/2011    Encounter for long-term (current) use of aspirin 01/16/2011    Microscopic hematuria 05/11/2011    Mononeuritis of  unspecified site 07/15/2010    Need for prophylactic immunotherapy 06/02/2011    Nocturia 11/02/2010    Alcohol abuse, in remission 06/02/2010    Obstruction of bile duct 10/24/2010    Open wound of finger(s) , without mention of complication 07/12/2011    Other and unspecified alcohol dependence, unspecified drinking behavior 01/16/2011    Other ascites 06/16/2010    Other cells and casts in urine 11/02/2010    Other sequelae of chronic liver disease 07/28/2011    Other specified disorders of biliary tract 10/23/2010    Other specified disorders of liver 09/26/2010    Peptic ulcer, unspecified site, unspecified as acute or chronic, without mention of hemorrhage, perforation, or obstruction 10/20/2010    Personal history of other infectious and parasitic disease 11/15/2010    Portal hypertension 07/26/2010    Secondary diabetes mellitus without mention of complication, uncontrolled 10/24/2010    Unspecified disorder of male genital organs 11/02/2010    Genital herpes, unspecified 06/02/2011    Orchitis and epididymitis, unspecified 10/12/2010    Unspecified viral hepatitis C without hepatic coma 10/20/2010    Hepatitis C 12/10/2012    Genital herpes     Hyperpigmentation 10/21/2014    Diskitis 01/15/2015    Lower back pain 01/15/2015    Discitis of lumbosacral region 01/16/2015    Lumbar discitis 06/15/2015    Lumbar myelopathy 06/18/2015    Abnormal gait 07/09/2015    Muscle weakness 07/09/2015    LBP (low back pain) 07/09/2015    S/P lumbar spinal fusion 08/11/2015    Hyponatremia 10/04/2016    OSMANY (acute kidney injury) 10/04/2016    Volume depletion 02/28/2017    Ischemic leg 08/30/2017    Sacroiliitis 12/02/2019    Abdominal pain 01/23/2020    SBO (small bowel obstruction)     Sepsis 11/11/2020    Transaminitis 11/11/2020    Right lower quadrant abdominal pain 11/12/2020    Diarrhea 11/13/2020    Severe obesity (BMI 35.0-39.9) with comorbidity 12/01/2020    Diabetic polyneuropathy associated with type 2 diabetes  mellitus 12/01/2020    Penetrating foot wound 10/11/2021    Diabetic foot infection 10/11/2021    Left hand weakness 02/01/2022    Fine motor impairment 02/14/2022    Loss of feeling or sensation 02/14/2022    Myeloradiculopathy 08/22/2022    Chronic neck pain with history of cervical spinal surgery 01/10/2023    Hypomagnesemia 05/13/2023    NSTEMI (non-ST elevated myocardial infarction) 08/02/2023     Past Medical History:   Diagnosis Date    Anemia     Cataract     Diabetes mellitus type II, uncontrolled     ED (erectile dysfunction)     Encounter for blood transfusion     Gout, arthritis     History of alcohol abuse     History of positive PPD, treatment status unknown     History of substance abuse     Hypothyroidism     Pancreatitis 2016    Peptic ulcer disease        PAST SURGICAL HISTORY:  Past Surgical History:   Procedure Laterality Date    ANTERIOR CERVICAL DISCECTOMY W/ FUSION N/A 8/22/2022    Procedure: Procedure: ACDF 4-7 LOS: 4.0 Anesthesia: General Blood: Type & Screen Radiology: C-Arm Microscope: ------- SNS: EMG, SEP, MEP Brace: [place] Bed: Regular Bed, Shoulder Strap Headrest:------ Position: Supine Equipment: Depuy;  Surgeon: Titi Christian MD;  Location: [facility] OR;  Service: Neurosurgery;  Laterality: N/A;  Depuy  confirmed CW 8/19  Neuro monitoring confirmed CW 8/19    CARPAL TUNNEL RELEASE Left 10/29/2021    Procedure: RELEASE, CARPAL TUNNEL;  Surgeon: Jameson Alejo Jr., MD;  Location: [facility] OR;  Service: Orthopedics;  Laterality: Left;    CHOLECYSTECTOMY      CORONARY ANGIOGRAPHY N/A 8/2/2023    Procedure: ANGIOGRAM, CORONARY ARTERY;  Surgeon: Juan Vera MD;  Location: [facility] CATH LAB/EP;  Service: Cardiology;  Laterality: N/A;    DECOMPRESSION OF CERVICAL SPINE BY ANTERIOR APPROACH WITH FUSION  8/22/2022    Procedure: DECOMPRESSION AND FUSION, SPINE, CERVICAL, ANTERIOR APPROACH;  Surgeon: Titi Christian MD;  Location: [facility] OR;  Service: Neurosurgery;;    INJECTION OF JOINT Right  12/2/2019    Procedure: INJECTION, JOINT SI;  Surgeon: Thu Penn MD;  Location: [facility] PAIN MGT;  Service: Pain Management;  Laterality: Right;  RT SI JNT INJ    INSERTION OF SPINAL NEUROSTIMULATOR N/A 9/3/2024    Procedure: INSERTION, NEUROSTIMULATOR, SPINAL;  Surgeon: Khadar Payne MD;  Location: [facility] OR;  Service: Neurosurgery;  Laterality: N/A;  Procedure:C1-2 generator placement  Length of procedure:1.5 hours  LOS: 0-1 nights  Anesthesia:General  Blood:Type and screen  Radiology:C-arm  Bed:Regular Bed  Headrest:[place]  Position:Prone  Equipment:[employer]    LAMINECTOMY, SPINE, FOR NEUROSTIMULATOR ELECTRODE INSERTION N/A 8/29/2024    Procedure: LAMINECTOMY, SPINE, FOR NEUROSTIMULATOR ELECTRODE INSERTION;  Surgeon: Khadar Payne MD;  Location: [facility] OR;  Service: Neurosurgery;  Laterality: N/A;  Procedure: C1-2 laminectomy for paddle lead placement for spinal cord stimulator trial, extension   Length of procedure: 2 hours  LOS: 0 nights  Anesthesia:General  Blood:Type and screen  Radiology:C-arm  SNS:EMG,SEP,MEP  Position:Pro    LEFT HEART CATHETERIZATION Left 8/2/2023    Procedure: Left heart cath;  Surgeon: Juan Vera MD;  Location: [facility] CATH LAB/EP;  Service: Cardiology;  Laterality: Left;    LIVER TRANSPLANT  06/2010    SPINE SURGERY      TRANSFORAMINAL EPIDURAL INJECTION OF STEROID Left 5/29/2023    Procedure: INJECTION, STEROID, EPIDURAL, TRANSFORAMINAL APPROACH,  LEFT C7-T1 DIRECT REF;  Surgeon: Thu Penn MD;  Location: [facility] PAIN MGT;  Service: Pain Management;  Laterality: Left;  4/3 MD ILL/PT WILL C/B       SOCIAL HISTORY:   Social History     Socioeconomic History    Marital status:    Tobacco Use    Smoking status: Former     Passive exposure: Never    Smokeless tobacco: Never   Substance and Sexual Activity    Alcohol use: No     Comment: over 5 years ago, none currently    Drug use: Not Currently     Comment: Former cocaine use    Sexual activity: Yes     Social  Drivers of Health     Financial Resource Strain: Low Risk  (8/30/2024)    Overall Financial Resource Strain (CARDIA)     Difficulty of Paying Living Expenses: Not hard at all   Food Insecurity: No Food Insecurity (8/30/2024)    Hunger Vital Sign     Worried About Running Out of Food in the Last Year: Never true     Ran Out of Food in the Last Year: Never true   Transportation Needs: No Transportation Needs (8/30/2024)    TRANSPORTATION NEEDS     Transportation : No   Physical Activity: Sufficiently Active (8/30/2024)    Exercise Vital Sign     Days of Exercise per Week: 7 days     Minutes of Exercise per Session: 30 min   Recent Concern: Physical Activity - Insufficiently Active (8/22/2024)    Exercise Vital Sign     Days of Exercise per Week: 2 days     Minutes of Exercise per Session: 20 min   Stress: No Stress Concern Present (8/30/2024)    [place] [place] [employer] - Occupational Stress Questionnaire     Feeling of Stress : Not at all   Recent Concern: Stress - Stress Concern Present (8/22/2024)    [place] [place] [employer] - Occupational Stress Questionnaire     Feeling of Stress : To some extent   Housing Stability: Low Risk  (8/30/2024)    Housing Stability Vital Sign     Unable to Pay for Housing in the Last Year: No     Homeless in the Last Year: No       FAMILY HISTORY:  Family History   Problem Relation Name Age of Onset    Cancer Mother      Diabetes Mother      Heart disease Mother      Diabetes Sister      Cancer Maternal Uncle  82        colon CA    Drug abuse Daughter      Melanoma Neg Hx      Psoriasis Neg Hx      Lupus Neg Hx      Eczema Neg Hx      Acne Neg Hx         CURRENTS MEDICATIONS:  Current Outpatient Medications on File Prior to Visit   Medication Sig Dispense Refill    allopurinoL (ZYLOPRIM) 100 MG tablet TAKE 1 TABLET BY MOUTH TWICE A  tablet 0    aluminum-magnesium hydroxide-simethicone (MAALOX) 200-200-20 mg/5 mL Susp Take 30 mLs by mouth 4 (four)  "times daily before meals and nightly. 769 mL 0    blood-glucose meter,continuous (DEXCOM G7 ) Misc Use as directed. 1 each 11    blood-glucose sensor (DEXCOM G7 SENSOR) Viviane Use as directed. 3 each 11    blood-glucose transmitter (DEXCOM G6 TRANSMITTER) Viviane Use as directed 1 each 11    calcium carbonate-vitamin D3 (CALCIUM 600 WITH VITAMIN D3) 600 mg(1,500mg) -400 unit Chew Take 1 tablet by mouth once daily.       cyanocobalamin (VITAMIN B-12) 100 MCG tablet Take 100 mcg by mouth once daily.      diphenhydrAMINE (BENADRYL) 25 mg capsule Take 25 mg by mouth daily as needed for Allergies (Cold).      gabapentin (NEURONTIN) 800 MG tablet TAKE 1 TABLET BY MOUTH THREE TIMES A DAY 90 tablet 11    insulin glargine U-300 conc (TOUJEO MAX U-300 SOLOSTAR) 300 unit/mL (3 mL) insulin pen Inject 40 Units into the skin once daily. 3 mL 11    insulin lispro (HUMALOG KWIKPEN INSULIN) 100 unit/mL pen Inject 20 Units into the skin 3 (three) times daily with meals. 60 mL 11    lancets 30 gauge Misc 1 lancet by Misc.(Non-Drug; Combo Route) route 4 (four) times daily before meals and nightly. 200 each 11    levothyroxine (SYNTHROID) 88 MCG tablet TAKE 1 TABLET BY MOUTH EVERY DAY 90 tablet 3    lisinopriL (PRINIVIL,ZESTRIL) 20 MG tablet TAKE 1 TABLET BY MOUTH EVERY DAY 90 tablet 3    methocarbamoL (ROBAXIN) 750 MG Tab Take 1 tablet (750 mg total) by mouth 3 (three) times daily as needed (muscle spasms). 60 tablet 0    metoprolol succinate (TOPROL-XL) 200 MG 24 hr tablet Take 1 tablet (200 mg total) by mouth once daily. 90 tablet 3    multivit with min-folic acid (MEN'S MULTIVITAMIN GUMMIES) 200 mcg Chew Take 1 tablet by mouth once daily.      NIFEdipine (PROCARDIA-XL) 30 MG (OSM) 24 hr tablet TAKE 1 TABLET BY MOUTH EVERY DAY 90 tablet 3    NOVOFINE PLUS 32 gauge x 1/6" Ndle       papaverine 30 mg/mL injection Tri-Mix - PGE (alprostadil) 10mcg, papavarine 30mg, phentolamine 1mg; dispense 5mL vial, typical starting is 0.2 mL which " is equal to 20 units (Patient taking differently: as needed. Tri-Mix - PGE (alprostadil) 10mcg, papavarine 30mg, phentolamine 1mg; dispense 5mL vial, typical starting is 0.2 mL which is equal to 20 units) 10 mL 5    sacubitriL-valsartan (ENTRESTO) 49-51 mg per tablet Take 1 tablet by mouth 2 (two) times daily. 60 tablet 3    sildenafiL (VIAGRA) 100 MG tablet Take 1 tablet (100 mg total) by mouth daily as needed for Erectile Dysfunction. 30 tablet 11    sildenafiL (VIAGRA) 50 MG tablet Take 1 tablet (50 mg total) by mouth daily as needed for Erectile Dysfunction. 30 tablet 0    tacrolimus (PROGRAF) 1 MG Cap Take 2 capsules (2 mg total) by mouth every morning AND 1 capsule (1 mg total) every evening. 90 capsule 11    tirzepatide (MOUNJARO) 2.5 mg/0.5 mL PnIj Inject 2.5 mg into the skin every 7 days. 4 Pen 11    traZODone (DESYREL) 50 MG tablet TAKE 1 TABLET BY MOUTH EVERY DAY IN THE EVENING 90 tablet 3    TRUE METRIX GLUCOSE TEST STRIP Strp USE 3 TIMES DAILY TO TEST BLOOD GLUCOSE LEVEL 100 strip 11    [DISCONTINUED] oxyCODONE-acetaminophen (PERCOCET)  mg per tablet Take 1 tablet by mouth every 12 (twelve) hours as needed for Pain. Quantity greater than 7 day supply medically necessary 60 tablet 0    atorvastatin (LIPITOR) 40 MG tablet Take 1 tablet (40 mg total) by mouth once daily. 90 tablet 3    blood-glucose meter Misc 1 Device by Misc.(Non-Drug; Combo Route) route 3 (three) times daily. 1 each 0    furosemide (LASIX) 20 MG tablet Take 2 tablets (40 mg total) by mouth daily as needed (For Weight Gain > 2-3 lbs in 1 day or 4-6 lbs over 1 week notify PCP and take 40 mg daily for three days). (Patient not taking: Reported on 4/1/2024) 60 tablet 0    [DISCONTINUED] insulin aspart U-100 (NOVOLOG FLEXPEN U-100 INSULIN) 100 unit/mL (3 mL) InPn pen Inject 20 Units into the skin 3 (three) times daily with meals. 15 mL 11     No current facility-administered medications on file prior to visit.       ALLERGIES:  Review  of patient's allergies indicates:  No Known Allergies    REVIEW OF SYSTEMS:  Review of Systems   Constitutional:  Negative for diaphoresis, fever and weight loss.   Respiratory:  Negative for shortness of breath.    Cardiovascular:  Negative for chest pain.   Gastrointestinal:  Negative for blood in stool.   Genitourinary:  Negative for hematuria.   Endo/Heme/Allergies:  Does not bruise/bleed easily.   All other systems reviewed and are negative.        OBJECTIVE:    PHYSICAL EXAMINATION:   Vitals:    12/09/24 0942   BP: 107/63   Pulse: 79       Physical Exam:  Vitals reviewed.    Constitutional: He appears well-developed and well-nourished.     Eyes: Pupils are equal, round, and reactive to light. Conjunctivae and EOM are normal.     Cardiovascular: Normal distal pulses and no edema.     Abdominal: Soft.     Skin: Skin displays no rash on trunk and no rash on extremities. Skin displays no lesions on trunk and no lesions on extremities.     Psych/Behavior: He is alert. He is oriented to person, place, and time. He has a normal mood and affect.     Musculoskeletal:        Neck: Range of motion is limited.     Neurological:        DTRs: Brachioradialis reflexes are 0 on the right side and 0 on the left side. Patellar reflexes are 0 on the right side and 0 on the left side.       Back Exam     Muscle Strength   Right Quadriceps:  5/5   Left Quadriceps:  5/5   Right Hamstrings:  5/5   Left Hamstrings:  5/5             SI joint:   Palpation at the right and left SI joints not painful  HUBER test is negative bilaterally  Gaenslen test is negative bilaterally  Thigh thrust test is negative bilaterally    Neurological Exam  Mental Status  Alert. Oriented to person, place, and time. Speech is normal.    Cranial Nerves  CN III, IV, VI: Extraocular movements intact bilaterally. Pupils equal round and reactive to light bilaterally.    Motor   Normal muscle tone.                                               Right                      Left  Deltoid                                   4                          4   Biceps                                   5                          5   Triceps                                  4                          4   Interossei                              4                          4   Iliopsoas                               4                          4   Quadriceps                           5                          5   Hamstring                             5                          5   Gastrocnemius                     5                           5   Anterior tibialis                      5                          5   Posterior tibialis                    5                          5   Peroneal                               5                          5    Sensory  Light touch is normal in upper and lower extremities. Pinprick is normal in upper and lower extremities.     Reflexes                                            Right                      Left  Brachioradialis                    0                         0  Patellar                                0                         0  Right Plantar: normal  Left Plantar: normal    Right pathological reflexes: Olu's absent. Ankle clonus absent.  Left pathological reflexes: Olu's absent. Ankle clonus absent.    Gait    Spastic gait.        DIAGNOSTIC DATA:  I personally interpreted the following imaging:   Cervical x-ray shows good positioning of paddle lead, C4-7 anterior fusion, consolidated  Cervical spine MRI February 20, 2024 was showing moderate residual stenosis at C4-5, C5-6, moderate stenosis at C3-4    ASSESSMENT:  This is a 68 y.o. male with     Problem List Items Addressed This Visit    None  Visit Diagnoses       Spinal stenosis, cervical region    -  Primary    Relevant Orders    MRI Cervical Spine Without Contrast    Spinal cord stimulator status        Status post cervical spinal fusion                  PLAN:  Repeat cervical  spine MRI due to worsening gait imbalance and upper extremity weakness  Continue spinal cord stimulator with same settings  Follow up in 3 months  We will call back once MRI is completed  All questions answered    More than 50% of the time was spent on discussing conservative management treatments (medication, physical therapy exercises) and possible interventions (spinal injections and surgical procedures). Care coordination was discussed.    30 min        Khadar Payne MD  Cell:[phone]

## 2024-12-16 DIAGNOSIS — Z94.4 S/P LIVER TRANSPLANT: ICD-10-CM

## 2024-12-17 RX ORDER — TACROLIMUS 1 MG/1
CAPSULE ORAL
Qty: 90 CAPSULE | Refills: 11 | Status: SHIPPED | OUTPATIENT
Start: 2024-12-17

## 2024-12-20 ENCOUNTER — TELEPHONE (OUTPATIENT)
Dept: NEUROSURGERY | Facility: CLINIC | Age: 69
End: 2024-12-20
Payer: MEDICARE

## 2024-12-27 DIAGNOSIS — G89.4 CHRONIC PAIN SYNDROME: ICD-10-CM

## 2024-12-27 RX ORDER — OXYCODONE AND ACETAMINOPHEN 10; 325 MG/1; MG/1
1 TABLET ORAL EVERY 8 HOURS PRN
Qty: 90 TABLET | Refills: 0 | Status: SHIPPED | OUTPATIENT
Start: 2024-12-27 | End: 2025-01-26

## 2025-01-01 NOTE — PLAN OF CARE
[facility] S[facility]    HOME HEALTH ORDERS  FACE TO FACE ENCOUNTER    Patient Name: Vick Gonzalez  YOB: 1955    PCP: Emanuel Lopez MD   PCP Address: [address] / [place] [address]  PCP Phone Number: [phone]  PCP Fax: [phone]       Encounter Date: 08/24/2022    Admit to Home Health    Diagnoses:  Active Hospital Problems    Diagnosis  POA    *Myeloradiculopathy [G54.9]  Yes    Diabetes mellitus type II, uncontrolled [E11.65]  Yes    Essential hypertension [I10]  Yes    Hypothyroidism [E03.9]  Yes    Thrombocytopenia [D69.6]  Yes      Resolved Hospital Problems   No resolved problems to display.       Future Appointments   Date Time Provider Department Center   9/6/2022  8:45 AM [facility] OIC-US1 MASTER [facility] ULTR [facility]   9/7/2022  8:30 AM Paige Gilbert PA-C [facility] NEUROSU [facility]   10/4/2022  9:40 AM Titi Christian MD [facility] NEUROSU [facility]   10/24/2022  9:45 AM LAB, [facility] [place]           I have seen and examined this patient face to face today. My clinical findings that support the need for the home health skilled services and home bound status are the following:  Weakness/numbness causing balance and gait disturbance due to Weakness/Debility and Surgery making it taxing to leave home.  Requiring assistive device to leave home due to unsteady gait caused by  Weakness/Debility and Surgery.  Medical restrictions requiring assistance of another human to leave home due to  Unstable ambulation and Post surgery monitoring.    Allergies:Review of patient's allergies indicates:  No Known Allergies    Diet: diabetic diet: 2000 calorie    Activities: activity as tolerated    Nursing:   SN to complete comprehensive assessment including routine vital signs. Instruct on disease process and s/s of complications to report to MD. Review/verify medication list sent home with the patient at time of discharge  and instruct patient/caregiver as  needed. Frequency may be adjusted depending on start of care date.    Notify MD if SBP > 160 or < 90; DBP > 90 or < 50; HR > 120 or < 50; Temp > 101; Other:         CONSULTS:    Physical Therapy to evaluate and treat. Evaluate for home safety and equipment needs; Establish/upgrade home exercise program. Perform / instruct on therapeutic exercises, gait training, transfer training, and Range of Motion.  Occupational Therapy to evaluate and treat. Evaluate home environment for safety and equipment needs. Perform/Instruct on transfers, ADL training, ROM, and therapeutic exercises.  Aide to provide assistance with personal care, ADLs, and vital signs.        I certify that this patient is confined to his home and needs intermittent skilled nursing care, physical therapy and occupational therapy.    Paige Gilbert, [facility], PA-C  Neurosurgery  [facility]  08/24/2022         Initial (On Arrival)

## 2025-01-02 ENCOUNTER — TELEPHONE (OUTPATIENT)
Dept: NEUROSURGERY | Facility: CLINIC | Age: 70
End: 2025-01-02
Payer: MEDICARE

## 2025-01-02 DIAGNOSIS — G89.4 CHRONIC PAIN SYNDROME: ICD-10-CM

## 2025-01-02 RX ORDER — OXYCODONE AND ACETAMINOPHEN 10; 325 MG/1; MG/1
1 TABLET ORAL EVERY 8 HOURS PRN
Qty: 90 TABLET | Refills: 0 | Status: SHIPPED | OUTPATIENT
Start: 2025-01-02 | End: 2025-02-01

## 2025-01-02 NOTE — TELEPHONE ENCOUNTER
Returned pt's call, pt stated that he has been having severe pain in his neck and he will not be able to complete his MRI on tomorrow due to his phone for the [employer] being locked. I stated to pt that I will send a message to his [employer] rep and I sent a refill request over to  for his pain medication. Pt voiced understanding.

## 2025-01-08 DIAGNOSIS — E11.9 TYPE 2 DIABETES MELLITUS WITHOUT COMPLICATION: ICD-10-CM

## 2025-01-09 ENCOUNTER — TELEPHONE (OUTPATIENT)
Dept: NEUROSURGERY | Facility: CLINIC | Age: 70
End: 2025-01-09
Payer: MEDICARE

## 2025-01-27 DIAGNOSIS — G89.4 CHRONIC PAIN SYNDROME: ICD-10-CM

## 2025-01-28 RX ORDER — OXYCODONE AND ACETAMINOPHEN 10; 325 MG/1; MG/1
1 TABLET ORAL EVERY 8 HOURS PRN
Qty: 90 TABLET | Refills: 0 | Status: SHIPPED | OUTPATIENT
Start: 2025-01-28 | End: 2025-02-27

## 2025-01-29 ENCOUNTER — TELEPHONE (OUTPATIENT)
Dept: NEUROSURGERY | Facility: CLINIC | Age: 70
End: 2025-01-29
Payer: MEDICARE

## 2025-02-03 NOTE — TELEPHONE ENCOUNTER
Care Due:                  Date            Visit Type   Department     Provider  --------------------------------------------------------------------------------                                EP -                              [facility]  Last Visit: 08-      CARE ([facility])   [facility]                              [facility]  Next Visit: 02-      CARE ([facility])   [facility]                                                            Last  Test          Frequency    Reason                     Performed    Due Date  --------------------------------------------------------------------------------    HBA1C.......  6 months...  insulin, tirzepatide.....  07- 01-    Uric Acid...  12 months..  allopurinoL..............  Not Found    Overdue    [employer] Embedded Care Due Messages. Reference number: [id number].   2/03/2025 4:54:15 PM CST

## 2025-02-04 RX ORDER — BLOOD-GLUCOSE TRANSMITTER
EACH MISCELLANEOUS
Qty: 1 EACH | Refills: 11 | Status: SHIPPED | OUTPATIENT
Start: 2025-02-04 | End: 2025-02-06 | Stop reason: SDUPTHER

## 2025-02-04 NOTE — TELEPHONE ENCOUNTER
----- Message from Scott sent at 2/4/2025 12:08 PM CST -----  Contact: pt  Type: Requesting to speak with nurse        Who Called: PT  Regarding: needs a DEXCON    Would the patient rather a call back or a response via [facility]? Call back  Best Call Back Number: [phone]   Additional Information:

## 2025-02-05 DIAGNOSIS — E11.9 TYPE 2 DIABETES MELLITUS WITHOUT COMPLICATION: ICD-10-CM

## 2025-02-06 DIAGNOSIS — Z79.4 TYPE 2 DIABETES MELLITUS WITH DIABETIC POLYNEUROPATHY, WITH LONG-TERM CURRENT USE OF INSULIN: ICD-10-CM

## 2025-02-06 DIAGNOSIS — E11.42 TYPE 2 DIABETES MELLITUS WITH DIABETIC POLYNEUROPATHY, WITH LONG-TERM CURRENT USE OF INSULIN: ICD-10-CM

## 2025-02-06 RX ORDER — BLOOD-GLUCOSE SENSOR
EACH MISCELLANEOUS
Qty: 3 EACH | Refills: 11 | Status: SHIPPED | OUTPATIENT
Start: 2025-02-06 | End: 2025-02-27 | Stop reason: SDUPTHER

## 2025-02-06 RX ORDER — BLOOD-GLUCOSE TRANSMITTER
EACH MISCELLANEOUS
Qty: 1 EACH | Refills: 11 | Status: SHIPPED | OUTPATIENT
Start: 2025-02-06

## 2025-02-06 NOTE — TELEPHONE ENCOUNTER
----- Message from Jeremías sent at 2/4/2025 12:40 PM CST -----  Type:   Call    Who Called:wife   Who Left Message for Patient:requested to speak with nurse   Does the patient know what this is regarding?:dexcom meter   Would the patient rather a call back or a response via [facility]? Call   Best Call Back Number: [phone]  Additional Information:

## 2025-02-06 NOTE — TELEPHONE ENCOUNTER
No care due was identified.  [facility] Embedded Care Due Messages. Reference number: [id number].   2/06/2025 8:41:35 AM CST

## 2025-02-06 NOTE — TELEPHONE ENCOUNTER
Called and spoke with pt   [facility] the transmitter and also needs the other part of the device  Have pended

## 2025-02-07 DIAGNOSIS — Z79.4 TYPE 2 DIABETES MELLITUS WITH DIABETIC POLYNEUROPATHY, WITH LONG-TERM CURRENT USE OF INSULIN: ICD-10-CM

## 2025-02-07 DIAGNOSIS — E11.42 TYPE 2 DIABETES MELLITUS WITH DIABETIC POLYNEUROPATHY, WITH LONG-TERM CURRENT USE OF INSULIN: ICD-10-CM

## 2025-02-07 RX ORDER — BLOOD-GLUCOSE SENSOR
EACH MISCELLANEOUS
Qty: 3 EACH | Refills: 11 | Status: CANCELLED | OUTPATIENT
Start: 2025-02-07

## 2025-02-07 NOTE — TELEPHONE ENCOUNTER
No care due was identified.  [facility] Embedded Care Due Messages. Reference number: [id number].   2/07/2025 7:54:51 AM CST

## 2025-02-07 NOTE — TELEPHONE ENCOUNTER
----- Message from Aydee sent at 2/6/2025  4:13 PM CST -----  Type:  Needs Medical Advice    Who Called: Pt    Pharmacy name and phone #: [facility] F[facility]  [phone]    Pharmacy Address and Hours    Address Hours  [address] [address]       Would the patient rather a call back or a response via [facility]? CALL   Best Call Back Number: [phone]  Additional Information: pt still receiving G6 pharmacy stated they don't have script for G7 can it be resent.

## 2025-02-07 NOTE — TELEPHONE ENCOUNTER
Please clarify because it is was Dexcom G7 that was sent yesterday.  Pharmacy confirmed receipt of the G7 prescription at 11:42 a.m. on 02/06/2025.  Do they need G6 sent or the G7 sent?

## 2025-02-11 ENCOUNTER — HOSPITAL ENCOUNTER (EMERGENCY)
Facility: HOSPITAL | Age: 70
Discharge: HOME | End: 2025-02-11
Attending: EMERGENCY MEDICINE
Payer: MEDICARE

## 2025-02-11 VITALS
HEIGHT: 74 IN | WEIGHT: 260 LBS | OXYGEN SATURATION: 97 % | DIASTOLIC BLOOD PRESSURE: 82 MMHG | TEMPERATURE: 98 F | HEART RATE: 92 BPM | BODY MASS INDEX: 33.37 KG/M2 | RESPIRATION RATE: 18 BRPM | SYSTOLIC BLOOD PRESSURE: 156 MMHG

## 2025-02-11 DIAGNOSIS — M54.41 CHRONIC BILATERAL LOW BACK PAIN WITH RIGHT-SIDED SCIATICA: Primary | ICD-10-CM

## 2025-02-11 DIAGNOSIS — G89.29 CHRONIC BILATERAL LOW BACK PAIN WITH RIGHT-SIDED SCIATICA: Primary | ICD-10-CM

## 2025-02-11 LAB — POCT GLUCOSE: 191 MG/DL (ref 70–110)

## 2025-02-11 PROCEDURE — 96372 THER/PROPH/DIAG INJ SC/IM: CPT | Performed by: EMERGENCY MEDICINE

## 2025-02-11 PROCEDURE — 63600175 PHARM REV CODE 636 W HCPCS: Performed by: EMERGENCY MEDICINE

## 2025-02-11 PROCEDURE — 82962 GLUCOSE BLOOD TEST: CPT

## 2025-02-11 PROCEDURE — 99284 EMERGENCY DEPT VISIT MOD MDM: CPT | Mod: 25

## 2025-02-11 PROCEDURE — 25000003 PHARM REV CODE 250: Performed by: EMERGENCY MEDICINE

## 2025-02-11 RX ORDER — LIDOCAINE 50 MG/G
1 PATCH TOPICAL
Status: DISCONTINUED | OUTPATIENT
Start: 2025-02-11 | End: 2025-02-11 | Stop reason: HOSPADM

## 2025-02-11 RX ORDER — METHOCARBAMOL 500 MG/1
500 TABLET, FILM COATED ORAL
Status: COMPLETED | OUTPATIENT
Start: 2025-02-11 | End: 2025-02-11

## 2025-02-11 RX ORDER — HYDROCODONE BITARTRATE AND ACETAMINOPHEN 5; 325 MG/1; MG/1
1 TABLET ORAL
Status: COMPLETED | OUTPATIENT
Start: 2025-02-11 | End: 2025-02-11

## 2025-02-11 RX ORDER — TRAMADOL HYDROCHLORIDE AND ACETAMINOPHEN 37.5; 325 MG/1; MG/1
1 TABLET ORAL EVERY 6 HOURS PRN
Qty: 8 TABLET | Refills: 0 | Status: SHIPPED | OUTPATIENT
Start: 2025-02-11 | End: 2025-02-13

## 2025-02-11 RX ORDER — MORPHINE SULFATE 4 MG/ML
4 INJECTION, SOLUTION INTRAMUSCULAR; INTRAVENOUS
Status: COMPLETED | OUTPATIENT
Start: 2025-02-11 | End: 2025-02-11

## 2025-02-11 RX ADMIN — MORPHINE SULFATE 4 MG: 4 INJECTION INTRAVENOUS at 01:02

## 2025-02-11 RX ADMIN — HYDROCODONE BITARTRATE AND ACETAMINOPHEN 1 TABLET: 5; 325 TABLET ORAL at 12:02

## 2025-02-11 RX ADMIN — METHOCARBAMOL 500 MG: 500 TABLET ORAL at 12:02

## 2025-02-11 NOTE — ED PROVIDER NOTES
"Encounter Date: 2/11/2025       History     Chief Complaint   Patient presents with    Back Pain     C/o pain across lower back and hips that radiates down (R) leg for a month. Pt. Reports it as exacerbation of sciatica.      Patient is a 69-year-old male with a history of chronic lower back pain, anxiety, sciatica, CKD 3, sacroiliac joint injections, laminectomy with neurostimulator insertion, chronic pain syndrome who presents to the ED with complaint of lower back pain.  Patient reports several days of worsening lower back pain described as throbbing and radiating down the posterior aspects of the bilateral legs; he states it is somewhat improved with rest and change in body position but can be exacerbated with movement.  He normally ambulates with a cane.  He denies any so-called "red flags" associated with his back pain such as saddle anesthesia, numbness tingling bowel or bladder incontinence or retention.  He has not taken anything for symptoms. Furthermore, he denies any  type complaints.       Review of patient's allergies indicates:  No Known Allergies  Past Medical History:   Diagnosis Date    Acute congestive heart failure 5/12/2023    Anemia     Anxiety     Cataract     Chronic pain syndrome 07/13/2011    CKD (chronic kidney disease), stage III     Coronary artery disease involving native coronary artery of native heart with angina pectoris 8/24/2023    Diabetes mellitus type II, uncontrolled     Discitis of lumbosacral region 01/16/2015    ED (erectile dysfunction)     Encounter for blood transfusion     Genital herpes     Gout, arthritis     History of alcohol abuse     History of hepatitis C, s/p successful Rx w/ SVR24 (cure) - 5/2018 08/23/2011    Completed 24wks Epclusa + RBV w/SVR12 - 2/2018  -     History of positive PPD, treatment status unknown     Pulmonary granulomas, negative sputum cultures for AFB and indeterminate quantferon test    History of substance abuse     Hypertension     " Hypothyroidism     Liver replaced by transplant 08/23/2011    DATE: 12/16/2013  LIVER BIOPSY : REASON:  hep C staging  PATHOLOGY COMMITTEE NOTE/PLAN: :grade  1 / stage 1        Pancreatitis 2016    Peptic ulcer disease     Pulmonary emphysema, unspecified emphysema type 5/26/2023     Past Surgical History:   Procedure Laterality Date    ANTERIOR CERVICAL DISCECTOMY W/ FUSION N/A 8/22/2022    Procedure: Procedure: ACDF 4-7 LOS: 4.0 Anesthesia: General Blood: Type & Screen Radiology: C-Arm Microscope: ------- SNS: EMG, SEP, MEP Brace: [place] Bed: Regular Bed, Shoulder Strap Headrest:------ Position: Supine Equipment: Depuy;  Surgeon: Titi Christian MD;  Location: [facility] OR;  Service: Neurosurgery;  Laterality: N/A;  Depuy  confirmed CW 8/19  Neuro monitoring confirmed CW 8/19    CARPAL TUNNEL RELEASE Left 10/29/2021    Procedure: RELEASE, CARPAL TUNNEL;  Surgeon: Jamesno Alejo Jr., MD;  Location: [facility] OR;  Service: Orthopedics;  Laterality: Left;    CHOLECYSTECTOMY      CORONARY ANGIOGRAPHY N/A 8/2/2023    Procedure: ANGIOGRAM, CORONARY ARTERY;  Surgeon: Juan Vera MD;  Location: [facility] CATH LAB/EP;  Service: Cardiology;  Laterality: N/A;    DECOMPRESSION OF CERVICAL SPINE BY ANTERIOR APPROACH WITH FUSION  8/22/2022    Procedure: DECOMPRESSION AND FUSION, SPINE, CERVICAL, ANTERIOR APPROACH;  Surgeon: Titi Christian MD;  Location: [facility] OR;  Service: Neurosurgery;;    INJECTION OF JOINT Right 12/2/2019    Procedure: INJECTION, JOINT SI;  Surgeon: Thu Penn MD;  Location: [facility] PAIN MGT;  Service: Pain Management;  Laterality: Right;  RT SI JNT INJ    INSERTION OF SPINAL NEUROSTIMULATOR N/A 9/3/2024    Procedure: INSERTION, NEUROSTIMULATOR, SPINAL;  Surgeon: Khadar Payne MD;  Location: [facility] OR;  Service: Neurosurgery;  Laterality: N/A;  Procedure:C1-2 generator placement  Length of procedure:1.5 hours  LOS: 0-1 nights  Anesthesia:General  Blood:Type and screen  Radiology:C-arm  Bed:Regular  Bed  Headrest:Denny  Position:Prone  Equipment:Giraldo-Doc Bob    LAMINECTOMY, SPINE, FOR NEUROSTIMULATOR ELECTRODE INSERTION N/A 8/29/2024    Procedure: LAMINECTOMY, SPINE, FOR NEUROSTIMULATOR ELECTRODE INSERTION;  Surgeon: Khadar Payne MD;  Location: [facility] OR;  Service: Neurosurgery;  Laterality: N/A;  Procedure: C1-2 laminectomy for paddle lead placement for spinal cord stimulator trial, extension   Length of procedure: 2 hours  LOS: 0 nights  Anesthesia:General  Blood:Type and screen  Radiology:C-arm  SNS:EMG,SEP,MEP  Position:Pro    LEFT HEART CATHETERIZATION Left 8/2/2023    Procedure: Left heart cath;  Surgeon: Juan Vera MD;  Location: [facility] CATH LAB/EP;  Service: Cardiology;  Laterality: Left;    LIVER TRANSPLANT  06/2010    SPINE SURGERY      TRANSFORAMINAL EPIDURAL INJECTION OF STEROID Left 5/29/2023    Procedure: INJECTION, STEROID, EPIDURAL, TRANSFORAMINAL APPROACH,  LEFT C7-T1 DIRECT REF;  Surgeon: Thu Penn MD;  Location: [facility] PAIN MGT;  Service: Pain Management;  Laterality: Left;  4/3 MD ILL/PT WILL C/B     Family History   Problem Relation Name Age of Onset    Cancer Mother      Diabetes Mother      Heart disease Mother      Diabetes Sister      Cancer Maternal Uncle  82        colon CA    Drug abuse Daughter      Melanoma Neg Hx      Psoriasis Neg Hx      Lupus Neg Hx      Eczema Neg Hx      Acne Neg Hx       Social History     Tobacco Use    Smoking status: Former     Passive exposure: Never    Smokeless tobacco: Never   Substance Use Topics    Alcohol use: No     Comment: over 5 years ago, none currently    Drug use: Not Currently     Comment: Former cocaine use     Review of Systems   Constitutional:  Negative for chills, fatigue and fever.   Gastrointestinal:  Negative for abdominal pain, nausea and vomiting.   Genitourinary:  Negative for difficulty urinating, flank pain and frequency.   Musculoskeletal:  Positive for back pain. Negative for gait problem, myalgias and neck  pain.       Physical Exam     Initial Vitals [02/11/25 1039]   BP Pulse Resp Temp SpO2   (!) 156/82 92 20 98.1 °F (36.7 °C) 97 %      MAP       --         Physical Exam    Nursing note and vitals reviewed.  Constitutional: He appears well-developed and well-nourished.   HENT:   Head: Normocephalic and atraumatic.   Right Ear: External ear normal.   Left Ear: External ear normal.   Eyes: Conjunctivae and EOM are normal. Pupils are equal, round, and reactive to light.   Neck: Neck supple.   Normal range of motion.  Cardiovascular:  Normal rate, regular rhythm and normal heart sounds.           Pulmonary/Chest: Breath sounds normal.   Abdominal: Abdomen is soft. Bowel sounds are normal.   Musculoskeletal:         General: Normal range of motion.      Cervical back: Normal range of motion and neck supple.      Comments: No midline tenderness  Well-healed presumptive laminectomy scars  No overlying erythema      Neurological: He is alert and oriented to person, place, and time. He has normal strength. No sensory deficit. GCS score is 15. GCS eye subscore is 4. GCS verbal subscore is 5. GCS motor subscore is 6.   No focal neurological deficit appreciated.  Strength 5/5 in all four extremities  Sensation grossly in tact    Psychiatric: He has a normal mood and affect.         ED Course   Procedures  Labs Reviewed   POCT GLUCOSE - Abnormal       Result Value    POCT Glucose 191 (*)           Imaging Results    None          Medications   methocarbamoL tablet 500 mg (500 mg Oral Given 2/11/25 1213)   HYDROcodone-acetaminophen 5-325 mg per tablet 1 tablet (1 tablet Oral Given 2/11/25 1239)   morphine injection 4 mg (4 mg Intramuscular Given 2/11/25 1333)     Medical Decision Making  Risk  Prescription drug management.               ED Course as of 02/11/25 1937   Tue Feb 11, 2025   1251 Pt requesting steroid injection.  [LC]      ED Course User Index  [LC] Jamel De Anda MD               Medical Decision Making:    Initial Assessment:   See HPI   Clinical Tests:   Lab Tests: Ordered and Reviewed  ED Management:  - this appears to be an exacerbation of the patient's chronic pain syndrome: No so-called red flags associated with his back plain concerning for spinal cord pathology; no  complaints whatsoever.  I did give the patient a dose of morphine Robaxin and Lidoderm patch with improvement his pain.  He stated to me that he is not currently driving and will get a ride home.  I will discharge him a very short course of Ultracet prn pain.  - No further intervention is indicated at this time after having taken into account the patient's history, physical exam findings, and empirical and objective data obtained during the patient's emergency department workup.   - The patient is at low risk for an emergent medical condition at this time, and I am of the belief that that it is safe to discharge the patient from the emergency department.   - The patient is instructed to follow up as outpatient as indicated on the discharge paperwork.    - I have discussed the specifics of the workup with the patient and the patient has verbalized understanding of the details of the workup, the diagnosis, the treatment plan, and the need for outpatient follow-up.    - Although the patient has no emergent etiology today this does not preclude the development of an emergent condition so, in addition, I have advised the patient that they can return to the ED and/or activate EMS at any time with worsening of their symptoms, change of their symptoms, or with any other medical complaint.    - The patient remained comfortable and stable during their visit in the ED.    - Discharge and follow-up instructions discussed with the patient who expressed understanding and willingness to comply with my recommendations.  - Results of all emergency department tests  discussed thoroughly with patient; all patient questions answered; pt in agreement with plan  - Pt  instructed to follow up with PCP in 2-3 days for recheck of today's complaints  - Pt given strict emergency department return precautions for any new or worsening of symptoms  - Pt discharged from the emergency department in stable condition, in no acute distress                Clinical Impression:  Final diagnoses:  [M54.41, G89.29] Chronic bilateral low back pain with right-sided sciatica (Primary)          ED Disposition Condition    Discharge Stable          ED Prescriptions       Medication Sig Dispense Start Date End Date Auth. Provider    tramadol-acetaminophen 37.5-325 mg (ULTRACET) 37.5-325 mg Tab Take 1 tablet by mouth every 6 (six) hours as needed for Pain. 8 tablet 2/11/2025 2/13/2025 Jamel De Anda MD          Follow-up Information    None          Jamel De Anda MD  02/11/25 1937

## 2025-02-11 NOTE — ED NOTES
Patient appeared alert and oriented, calm in no acute distress.  Patient ambulated to exit while waiting on ride.

## 2025-02-11 NOTE — ED NOTES
Pt reports to ED with c/o lower back pain for months that radiates down right leg      Adult Physical Assessment  LOC:  y.o. male verified via two identifiers.  The patient is awake, alert & oriented to person, place & time. No acute distress noted at this time, pt is speaking appropriately at this time.  APPEARANCE: Patient resting comfortably and appears to be in no acute distress at this time. Patient is clean and well groomed, patient's clothing is properly fastened.  SKIN:The skin is warm, dry & intact. Color consistent with ethnicity, patient has normal skin turgor and moist mucus membranes, skin intact, no breakdown or brusing noted.  MUSCULOSKELETAL: Patient moving all extremities well, no obvious swelling or deformities noted. Pain states that he is having pain to the whole lower back that radiates down right leg for several weeks now   RESPIRATORY: Airway is open and patent, respirations are spontaneous, even, and non-labored patient has a normal effort and rate, no accessory muscle use noted.     CARDIAC: Patient has a normal rate and rhythm, no periphreal edema noted in any extremity, capillary refill < 3 seconds in all extremities  ABDOMEN: Soft and non tender to palpation, no abdominal distention noted.   NEUROLOGIC: Sensation is intact. Eyes open spontaneously, behavior appropriate to situation, follows commands. Speech is clear and appropriate. Facial expression symmetrical, bilateral hand grasp equal and even. No bilateral lower extremity edema.

## 2025-02-24 ENCOUNTER — RESULTS FOLLOW-UP (OUTPATIENT)
Dept: HEPATOLOGY | Facility: CLINIC | Age: 70
End: 2025-02-24
Payer: MEDICARE

## 2025-02-24 ENCOUNTER — LAB VISIT (OUTPATIENT)
Dept: LAB | Facility: HOSPITAL | Age: 70
End: 2025-02-24
Attending: INTERNAL MEDICINE
Payer: MEDICARE

## 2025-02-24 ENCOUNTER — OFFICE VISIT (OUTPATIENT)
Dept: FAMILY MEDICINE | Facility: CLINIC | Age: 70
End: 2025-02-24
Payer: MEDICARE

## 2025-02-24 VITALS
HEART RATE: 95 BPM | OXYGEN SATURATION: 98 % | BODY MASS INDEX: 33.19 KG/M2 | DIASTOLIC BLOOD PRESSURE: 94 MMHG | HEIGHT: 74 IN | SYSTOLIC BLOOD PRESSURE: 150 MMHG | TEMPERATURE: 98 F | WEIGHT: 258.63 LBS

## 2025-02-24 DIAGNOSIS — E11.42 TYPE 2 DIABETES MELLITUS WITH DIABETIC POLYNEUROPATHY, WITH LONG-TERM CURRENT USE OF INSULIN: Primary | ICD-10-CM

## 2025-02-24 DIAGNOSIS — F19.10 SUBSTANCE ABUSE: ICD-10-CM

## 2025-02-24 DIAGNOSIS — Z23 NEEDS FLU SHOT: ICD-10-CM

## 2025-02-24 DIAGNOSIS — E11.42 DIABETIC POLYNEUROPATHY ASSOCIATED WITH TYPE 2 DIABETES MELLITUS: ICD-10-CM

## 2025-02-24 DIAGNOSIS — M96.1 POSTLAMINECTOMY SYNDROME OF LUMBAR REGION: ICD-10-CM

## 2025-02-24 DIAGNOSIS — J43.9 PULMONARY EMPHYSEMA, UNSPECIFIED EMPHYSEMA TYPE: ICD-10-CM

## 2025-02-24 DIAGNOSIS — Z79.4 TYPE 2 DIABETES MELLITUS WITH DIABETIC POLYNEUROPATHY, WITH LONG-TERM CURRENT USE OF INSULIN: Primary | ICD-10-CM

## 2025-02-24 DIAGNOSIS — G63 POLYNEUROPATHY ASSOCIATED WITH UNDERLYING DISEASE: ICD-10-CM

## 2025-02-24 DIAGNOSIS — I25.119 CORONARY ARTERY DISEASE INVOLVING NATIVE CORONARY ARTERY OF NATIVE HEART WITH ANGINA PECTORIS: ICD-10-CM

## 2025-02-24 DIAGNOSIS — Z23 NEED FOR COVID-19 VACCINE: ICD-10-CM

## 2025-02-24 DIAGNOSIS — I10 HYPERTENSION, UNSPECIFIED TYPE: ICD-10-CM

## 2025-02-24 DIAGNOSIS — D69.6 THROMBOCYTOPENIA: ICD-10-CM

## 2025-02-24 DIAGNOSIS — K74.60 HEPATIC CIRRHOSIS, UNSPECIFIED HEPATIC CIRRHOSIS TYPE, UNSPECIFIED WHETHER ASCITES PRESENT: ICD-10-CM

## 2025-02-24 DIAGNOSIS — D84.9 IMMUNOSUPPRESSION: ICD-10-CM

## 2025-02-24 DIAGNOSIS — G95.9 CERVICAL MYELOPATHY: ICD-10-CM

## 2025-02-24 DIAGNOSIS — Z94.4 S/P LIVER TRANSPLANT: ICD-10-CM

## 2025-02-24 DIAGNOSIS — I73.9 PAD (PERIPHERAL ARTERY DISEASE): ICD-10-CM

## 2025-02-24 DIAGNOSIS — I50.32 CHRONIC HEART FAILURE WITH PRESERVED EJECTION FRACTION: ICD-10-CM

## 2025-02-24 DIAGNOSIS — Z12.5 SCREENING FOR MALIGNANT NEOPLASM OF PROSTATE: ICD-10-CM

## 2025-02-24 LAB
ALBUMIN SERPL BCP-MCNC: 3.8 G/DL (ref 3.5–5.2)
ALP SERPL-CCNC: 62 U/L (ref 40–150)
ALT SERPL W/O P-5'-P-CCNC: 26 U/L (ref 10–44)
ANION GAP SERPL CALC-SCNC: 12 MMOL/L (ref 8–16)
AST SERPL-CCNC: 26 U/L (ref 10–40)
BASOPHILS # BLD AUTO: 0.03 K/UL (ref 0–0.2)
BASOPHILS NFR BLD: 0.4 % (ref 0–1.9)
BILIRUB SERPL-MCNC: 0.6 MG/DL (ref 0.1–1)
BUN SERPL-MCNC: 16 MG/DL (ref 8–23)
CALCIUM SERPL-MCNC: 9.1 MG/DL (ref 8.7–10.5)
CHLORIDE SERPL-SCNC: 106 MMOL/L (ref 95–110)
CO2 SERPL-SCNC: 23 MMOL/L (ref 23–29)
CREAT SERPL-MCNC: 1.2 MG/DL (ref 0.5–1.4)
DIFFERENTIAL METHOD BLD: ABNORMAL
EOSINOPHIL # BLD AUTO: 0.4 K/UL (ref 0–0.5)
EOSINOPHIL NFR BLD: 5.1 % (ref 0–8)
ERYTHROCYTE [DISTWIDTH] IN BLOOD BY AUTOMATED COUNT: 13.2 % (ref 11.5–14.5)
EST. GFR  (NO RACE VARIABLE): >60 ML/MIN/1.73 M^2
GLUCOSE SERPL-MCNC: 163 MG/DL (ref 70–110)
HCT VFR BLD AUTO: 42.2 % (ref 40–54)
HGB BLD-MCNC: 13.7 G/DL (ref 14–18)
IMM GRANULOCYTES # BLD AUTO: 0.02 K/UL (ref 0–0.04)
IMM GRANULOCYTES NFR BLD AUTO: 0.3 % (ref 0–0.5)
LYMPHOCYTES # BLD AUTO: 2.7 K/UL (ref 1–4.8)
LYMPHOCYTES NFR BLD: 36.3 % (ref 18–48)
MCH RBC QN AUTO: 29 PG (ref 27–31)
MCHC RBC AUTO-ENTMCNC: 32.5 G/DL (ref 32–36)
MCV RBC AUTO: 89 FL (ref 82–98)
MONOCYTES # BLD AUTO: 0.6 K/UL (ref 0.3–1)
MONOCYTES NFR BLD: 8.4 % (ref 4–15)
NEUTROPHILS # BLD AUTO: 3.7 K/UL (ref 1.8–7.7)
NEUTROPHILS NFR BLD: 49.5 % (ref 38–73)
NRBC BLD-RTO: 0 /100 WBC
PLATELET # BLD AUTO: ABNORMAL K/UL (ref 150–450)
PLATELET BLD QL SMEAR: ABNORMAL
PMV BLD AUTO: ABNORMAL FL (ref 9.2–12.9)
POTASSIUM SERPL-SCNC: 4.3 MMOL/L (ref 3.5–5.1)
PROT SERPL-MCNC: 7.5 G/DL (ref 6–8.4)
RBC # BLD AUTO: 4.72 M/UL (ref 4.6–6.2)
SODIUM SERPL-SCNC: 141 MMOL/L (ref 136–145)
TACROLIMUS BLD-MCNC: 13.5 NG/ML (ref 5–15)
WBC # BLD AUTO: 7.42 K/UL (ref 3.9–12.7)

## 2025-02-24 PROCEDURE — 1125F AMNT PAIN NOTED PAIN PRSNT: CPT | Mod: CPTII,S$GLB,, | Performed by: FAMILY MEDICINE

## 2025-02-24 PROCEDURE — 3288F FALL RISK ASSESSMENT DOCD: CPT | Mod: CPTII,S$GLB,, | Performed by: FAMILY MEDICINE

## 2025-02-24 PROCEDURE — 1159F MED LIST DOCD IN RCRD: CPT | Mod: CPTII,S$GLB,, | Performed by: FAMILY MEDICINE

## 2025-02-24 PROCEDURE — 1160F RVW MEDS BY RX/DR IN RCRD: CPT | Mod: CPTII,S$GLB,, | Performed by: FAMILY MEDICINE

## 2025-02-24 PROCEDURE — 3077F SYST BP >= 140 MM HG: CPT | Mod: CPTII,S$GLB,, | Performed by: FAMILY MEDICINE

## 2025-02-24 PROCEDURE — 90653 IIV ADJUVANT VACCINE IM: CPT | Mod: S$GLB,,, | Performed by: FAMILY MEDICINE

## 2025-02-24 PROCEDURE — 90480 ADMN SARSCOV2 VAC 1/ONLY CMP: CPT | Mod: S$GLB,,, | Performed by: FAMILY MEDICINE

## 2025-02-24 PROCEDURE — 99999 PR PBB SHADOW E&M-EST. PATIENT-LVL V: CPT | Mod: PBBFAC,,, | Performed by: FAMILY MEDICINE

## 2025-02-24 PROCEDURE — 85025 COMPLETE CBC W/AUTO DIFF WBC: CPT | Performed by: INTERNAL MEDICINE

## 2025-02-24 PROCEDURE — 80197 ASSAY OF TACROLIMUS: CPT | Performed by: INTERNAL MEDICINE

## 2025-02-24 PROCEDURE — 3080F DIAST BP >= 90 MM HG: CPT | Mod: CPTII,S$GLB,, | Performed by: FAMILY MEDICINE

## 2025-02-24 PROCEDURE — 1101F PT FALLS ASSESS-DOCD LE1/YR: CPT | Mod: CPTII,S$GLB,, | Performed by: FAMILY MEDICINE

## 2025-02-24 PROCEDURE — 99215 OFFICE O/P EST HI 40 MIN: CPT | Mod: 25,S$GLB,, | Performed by: FAMILY MEDICINE

## 2025-02-24 PROCEDURE — 3008F BODY MASS INDEX DOCD: CPT | Mod: CPTII,S$GLB,, | Performed by: FAMILY MEDICINE

## 2025-02-24 PROCEDURE — G0008 ADMIN INFLUENZA VIRUS VAC: HCPCS | Mod: S$GLB,,, | Performed by: FAMILY MEDICINE

## 2025-02-24 PROCEDURE — 91320 SARSCV2 VAC 30MCG TRS-SUC IM: CPT | Mod: S$GLB,,, | Performed by: FAMILY MEDICINE

## 2025-02-24 PROCEDURE — 80053 COMPREHEN METABOLIC PANEL: CPT | Performed by: INTERNAL MEDICINE

## 2025-02-24 PROCEDURE — 36415 COLL VENOUS BLD VENIPUNCTURE: CPT | Performed by: INTERNAL MEDICINE

## 2025-02-24 RX ORDER — ASPIRIN 81 MG/1
81 TABLET ORAL DAILY
COMMUNITY
Start: 2025-02-24

## 2025-02-24 NOTE — PROGRESS NOTES
(Portions of this note were dictated using voice recognition software and may contain dictation related errors in spelling/grammar/syntax not found on text review)    CC:   Chief Complaint   Patient presents with    Follow-up       HPI: 69 y.o. male       Neuro surgery following for cervical disc disease, Had cervical spine fusion surgery August 2022.  Followed up with neuro surgery and was having some worsening left upper extremity pain.  EMG showed chronic left C7 radiculopathy and left ulnar neuropathy across the elbow.  Last neuro surgery visit was 12/09/2024  Was planning for cervical spine surgery given radiculopathic/myelopathic symptoms September 25th although in the meantime was hospitalized for NSTEMI  Surgery was canceled secondary to this..  Ended up having spinal cord stimulator in August, 2024.  Currently on oxycodone through neuro surgery.  Currently on gabapentin 800 mg t.i.d. went to ER with worsening low back pain, was given methocarbamol, morphine, discharged with Ultracet.  Has a appointment with neuro surgery on March 12th      CHF prompting hospitalization in May 2023  Started on isosorbide hydralazine 20/37.5 t.i.d. (currently not on)  Prescribed furosemide 40 mg p.r.n. for weight gain greater than 2-3 lb in 1 day or 4-6 lb over 1 week, has not needed  Was on lisinopril but this was changed in favor of Entresto 49/51 b.i.d. after cardiology visit 07/24/2023 (however it looks like he is not taking Entresto and instead is on lisinopril again --listed as 20 mg although no recent fills.  His wife states that he is taking it but it is quite unclear.  BP is high  metoprolol 200 mg daily   Nifedipine 30 mg daily    Echo 08/03/2023: EF 55-60%, grade 1 diastolic dysfunction      NSTEMI hospitalized 08/02/2023, discharged 08/03/2023.  UDS was positive for cocaine and opiates.  Was given nitroglycerin and heparin infusions, seen in consultation with Cardiology, left heart catheterization showed  nonobstructive coronary artery disease, no stents placed.  Advise medical management with aspirin/Plavix for 1 year and high-intensity statin therapy    Has not seen Cardiology since 2023     Dizziness/disequilibrium, saw ENT.  CT head 11/01/2024 showed normal inner ear structures, trace left mastoid air cell fluid, chronic ischemic intracranial changes progression CT of 2022, new pontine infarct noted.  Patchy paranasal sinus thickening.  Was referred for vestibular therapy, looks like he no showed appointment based on chart review.       Diabetes with peripheral neuropathy:  Complicated by  dietary noncompliance , was followed by endocrinology (last seen January 2022)   His current regimen of diabetes was adjusted to glargine (toujeo) 40 units (states that he only take some of the time, Humalog to up to 20 units t.i.d. with meals.  Was believed that a lot of his hyperglycemia issues prior were relating to dietary and insulin noncompliance.   .  Was considering G LP 1 agonist therapy but there was some concern given prior history of pancreatitis, although pancreatitis prior was relating to alcohol use.  Given not drinking anymore and no recurrent issue with pancreatitis, we discussed starting on Mounjaro 2.5 mg weekly to make sure he can tolerate, states that he only take some of the time.  Most recent A1c in July of 2024 was 6.3 below.     Dyslipidemia was on Crestor at my last visit with him but currently med list now stays Lipitor 40 mg daily.  Unclear whether he is taking this or not     Hypothyroidism:  Synthroid 88mcg.       Anxiety was at some point on Zoloft 100 mg daily although medication looks to be discontinued in his medical record. , trazodone 50 mg q.h.s. he states that he is not taking anything for anxiety     Liver Transplant secondary to end-stage liver disease hepatitis C and prior alcohol abuse.:  Followed by hepatology.  On Prograf.   fibroscan showed stage II fibrosis         Past Medical  History:   Diagnosis Date    Acute congestive heart failure 5/12/2023    Anemia     Anxiety     Cataract     Chronic pain syndrome 07/13/2011    CKD (chronic kidney disease), stage III     Coronary artery disease involving native coronary artery of native heart with angina pectoris 8/24/2023    Diabetes mellitus type II, uncontrolled     Discitis of lumbosacral region 01/16/2015    ED (erectile dysfunction)     Encounter for blood transfusion     Genital herpes     Gout, arthritis     History of alcohol abuse     History of hepatitis C, s/p successful Rx w/ SVR24 (cure) - 5/2018 08/23/2011    Completed 24wks Epclusa + RBV w/SVR12 - 2/2018  -     History of positive PPD, treatment status unknown     Pulmonary granulomas, negative sputum cultures for AFB and indeterminate quantferon test    History of substance abuse     Hypertension     Hypothyroidism     Liver replaced by transplant 08/23/2011    DATE: 12/16/2013  LIVER BIOPSY : REASON:  hep C staging  PATHOLOGY COMMITTEE NOTE/PLAN: :grade  1 / stage 1        Pancreatitis 2016    Peptic ulcer disease     Pulmonary emphysema, unspecified emphysema type 5/26/2023       Past Surgical History:   Procedure Laterality Date    ANTERIOR CERVICAL DISCECTOMY W/ FUSION N/A 8/22/2022    Procedure: Procedure: ACDF 4-7 LOS: 4.0 Anesthesia: General Blood: Type & Screen Radiology: C-Arm Microscope: ------- SNS: EMG, SEP, MEP Brace: [place] Bed: Regular Bed, Shoulder Strap Headrest:------ Position: Supine Equipment: Depuy;  Surgeon: Titi Christian MD;  Location: [facility] OR;  Service: Neurosurgery;  Laterality: N/A;  Depuy  confirmed CW 8/19  Neuro monitoring confirmed CW 8/19    CARPAL TUNNEL RELEASE Left 10/29/2021    Procedure: RELEASE, CARPAL TUNNEL;  Surgeon: Jameson Alejo Jr., MD;  Location: [facility] OR;  Service: Orthopedics;  Laterality: Left;    CHOLECYSTECTOMY      CORONARY ANGIOGRAPHY N/A 8/2/2023    Procedure: ANGIOGRAM, CORONARY ARTERY;  Surgeon: Juan Vera MD;   Location: [facility] CATH LAB/EP;  Service: Cardiology;  Laterality: N/A;    DECOMPRESSION OF CERVICAL SPINE BY ANTERIOR APPROACH WITH FUSION  8/22/2022    Procedure: DECOMPRESSION AND FUSION, SPINE, CERVICAL, ANTERIOR APPROACH;  Surgeon: Titi Christian MD;  Location: [facility] OR;  Service: Neurosurgery;;    INJECTION OF JOINT Right 12/2/2019    Procedure: INJECTION, JOINT SI;  Surgeon: Thu Penn MD;  Location: [facility] PAIN MGT;  Service: Pain Management;  Laterality: Right;  RT SI JNT INJ    INSERTION OF SPINAL NEUROSTIMULATOR N/A 9/3/2024    Procedure: INSERTION, NEUROSTIMULATOR, SPINAL;  Surgeon: Khadar Payne MD;  Location: [facility] OR;  Service: Neurosurgery;  Laterality: N/A;  Procedure:C1-2 generator placement  Length of procedure:1.5 hours  LOS: 0-1 nights  Anesthesia:General  Blood:Type and screen  Radiology:C-arm  Bed:Regular Bed  Headrest:[place]  Position:Prone  Equipment:Kassandra Bob    LAMINECTOMY, SPINE, FOR NEUROSTIMULATOR ELECTRODE INSERTION N/A 8/29/2024    Procedure: LAMINECTOMY, SPINE, FOR NEUROSTIMULATOR ELECTRODE INSERTION;  Surgeon: Khadar Payne MD;  Location: [facility] OR;  Service: Neurosurgery;  Laterality: N/A;  Procedure: C1-2 laminectomy for paddle lead placement for spinal cord stimulator trial, extension   Length of procedure: 2 hours  LOS: 0 nights  Anesthesia:General  Blood:Type and screen  Radiology:C-arm  SNS:EMG,SEP,MEP  Position:Pro    LEFT HEART CATHETERIZATION Left 8/2/2023    Procedure: Left heart cath;  Surgeon: Juan Vera MD;  Location: [facility] CATH LAB/EP;  Service: Cardiology;  Laterality: Left;    LIVER TRANSPLANT  06/2010    SPINE SURGERY      TRANSFORAMINAL EPIDURAL INJECTION OF STEROID Left 5/29/2023    Procedure: INJECTION, STEROID, EPIDURAL, TRANSFORAMINAL APPROACH,  LEFT C7-T1 DIRECT REF;  Surgeon: Thu Penn MD;  Location: [facility] PAIN MGT;  Service: Pain Management;  Laterality: Left;  4/3 MD ILL/PT WILL C/B       Family History   Problem Relation Name Age of Onset     Cancer Mother      Diabetes Mother      Heart disease Mother      Diabetes Sister      Cancer Maternal Uncle  82        colon CA    Drug abuse Daughter      Melanoma Neg Hx      Psoriasis Neg Hx      Lupus Neg Hx      Eczema Neg Hx      Acne Neg Hx         Social History     Tobacco Use    Smoking status: Former     Passive exposure: Never    Smokeless tobacco: Never   Substance Use Topics    Alcohol use: No     Comment: over 5 years ago, none currently    Drug use: Not Currently     Comment: Former cocaine use       Lab Results   Component Value Date    WBC 7.42 02/24/2025    HGB 13.7 (L) 02/24/2025    HCT 42.2 02/24/2025    MCV 89 02/24/2025     11/15/2024    CHOL 143 08/03/2023    TRIG 234 (H) 08/03/2023    HDL 21 (L) 08/03/2023    ALT 42 11/15/2024    AST 35 11/15/2024    BILITOT 0.4 11/15/2024    ALKPHOS 77 11/15/2024     11/15/2024    K 3.9 11/15/2024     11/15/2024    CREATININE 1.1 11/15/2024    ESTGFRAFRICA >60.0 07/25/2022    EGFRNONAA 52.7 (A) 07/25/2022    EGFRNORACEVR >60.0 11/15/2024    CALCIUM 9.0 11/15/2024    ALBUMIN 3.7 11/15/2024    BUN 14 11/15/2024    CO2 25 11/15/2024    TSH 1.999 07/30/2024    PSA 0.22 01/19/2022    PSADIAG 0.28 10/09/2017    INR 1.1 07/30/2024    HGBA1C 6.3 (H) 07/30/2024    MICALBCREAT 30.8 (H) 08/16/2022    LDLCALC 75.2 08/03/2023     (H) 11/15/2024    [id number] 30 10/11/2021         Hemoglobin (g/dL)   Date Value   02/24/2025 13.7 (L)   11/15/2024 12.8 (L)   07/30/2024 12.5 (L)   07/01/2024 12.9 (L)   06/03/2024 12.7 (L)   03/04/2024 12.3 (L)   12/04/2023 12.0 (L)   11/27/2023 12.7 (L)   08/21/2023 13.1 (L)   08/03/2023 11.8 (L)     Platelets (K/uL)   Date Value   11/15/2024 165   07/30/2024 169   07/01/2024 156   06/03/2024 129 (L)   03/04/2024 156   12/04/2023 129 (L)   11/27/2023 143 (L)   08/21/2023 149 (L)   08/03/2023 133 (L)   08/02/2023 154     Hemoglobin A1C (%)   Date Value   07/30/2024 6.3 (H)   12/04/2023 8.5 (H)   05/13/2023 6.8  "(H)   12/05/2022 7.8 (H)   08/16/2022 9.7 (H)   01/25/2022 7.4 (H)   01/24/2022 7.4 (H)   10/11/2021 10.1 (H)   11/11/2020 9.1 (H)   09/01/2020 11.9 (H)     eGFR (mL/min/1.73 m^2)   Date Value   11/15/2024 >60.0   07/30/2024 >60   07/01/2024 >60.0   06/03/2024 >60.0   03/04/2024 >60.0   12/04/2023 >60.0   11/27/2023 >60.0   08/21/2023 >60.0   08/03/2023 >60   08/02/2023 41 (A)             Vital signs reviewed  Vitals:    02/24/25 1507   BP: (!) 150/94   BP Location: Right arm   Patient Position: Sitting   Pulse: 95   Temp: 97.8 °F (36.6 °C)   TempSrc: Oral   SpO2: 98%   Weight: 117.3 kg (258 lb 9.6 oz)   Height: 6' 2" (1.88 m)           Wt Readings from Last 4 Encounters:   02/24/25 117.3 kg (258 lb 9.6 oz)   02/11/25 117.9 kg (260 lb)   12/09/24 121.1 kg (266 lb 15.6 oz)   10/24/24 121.1 kg (266 lb 13.9 oz)       PE:   APPEARANCE: Well nourished, well developed, in no acute distress.    HEAD: Normocephalic, atraumatic.  EYES:    Conjunctivae noninjected.  NECK: Supple with no cervical lymphadenopathy.  No carotid bruits.  No thyromegaly  CHEST: Good inspiratory effort. Lungs clear to auscultation with no wheezes or crackles.  CARDIOVASCULAR: Normal S1, S2. No rubs, murmurs, or gallops.  ABDOMEN: Bowel sounds normal. Not distended. Soft. No tenderness or masses. No organomegaly.  EXTREMITIES: No edema       IMPRESSION  1. Type 2 diabetes mellitus with diabetic polyneuropathy, with long-term current use of insulin    2. S/P liver transplant    3. PAD (peripheral artery disease)    4. Immunosuppression    5. Hepatic cirrhosis, unspecified hepatic cirrhosis type, unspecified whether ascites present    6. Polyneuropathy associated with underlying disease    7. Pulmonary emphysema, unspecified emphysema type    8. Chronic heart failure with preserved ejection fraction    9. Thrombocytopenia    10. Coronary artery disease involving native coronary artery of native heart with angina pectoris    11. Postlaminectomy syndrome " of lumbar region    12. Diabetic polyneuropathy associated with type 2 diabetes mellitus    13. Hypertension, unspecified type    14. Screening for malignant neoplasm of prostate    15. Substance abuse    16. Cervical myelopathy    17. Needs flu shot    18. Need for COVID-19 vaccine                PLAN  Orders Placed This Encounter   Procedures    CBC Auto Differential    Comprehensive Metabolic Panel    Lipid Panel    TSH    Microalbumin/Creatinine Ratio, Urine    PSA, Screening    Hemoglobin A1C     Medications Ordered This Encounter   Medications    COVID-19 (Pfizer) 30 mcg/0.3 mL IM vaccine (>/= 11 yo) 0.3 mL    influenza (adjuvanted) (Fluad) 45 mcg/0.5 mL IM vaccine (> or = 66 yo) 0.5 mL         Hypertension, not controlled.  On metoprolol and nifedipine, 1 point was on ACE inhibitor and this was discontinued in favor of Entresto but now he states that he is not taking Entresto in his not sure why.  His wife states that he is back on lisinopril but this is very unclear.  Will get labs above and see patient back in around 2 weeks and he MUST BRING ALL OF HIS MEDICATIONS TO THAT VISIT    Non STEMI/history of CHF.  Advise rechecking with Cardiology.  Discussed discrepancies in his medication (at 1 point was placed on Entresto but now med list shows lisinopril instead??).  Will get him back in 2 weeks as above.  He is on aspirin daily    Diabetes:  Update labs.  Not adherent to his medication regimen.  Will have to address after labs at his follow up visit in 2 weeks    Has stable anemia.  Has prior thrombocytopenia but more recent platelet counts are within normal range.  Update labs    No longer drinking alcohol     Continue his current transplant immunotherapy      Chronic back pain, follow up with Neurosurgery as directed    Total time spent including face-to-face time and chart review and documentation time:  40min     SCREENINGS   Colonoscopy 2015, return in 10 years   prostate testing ordered PSA      Immunizations   Pneumovax 2019  PCV 10/29/2018   COVID update today   Hepatitis B series up-to-date   Tdap 2021   Can get zoster vaccine at pharmacy   Flu today

## 2025-02-25 DIAGNOSIS — G89.4 CHRONIC PAIN SYNDROME: ICD-10-CM

## 2025-02-25 NOTE — TELEPHONE ENCOUNTER
Called pt. He agreed to repeat labs in 1 month.  ----- Message from James Coleman MD sent at 2/25/2025  2:04 PM CST -----  1 month  ----- Message -----  From: Kandy Herrera RN  Sent: 2/24/2025   4:09 PM CST  To: James Coleman MD    Yes, he said he took meds prior to lab draw. When should he go back?  ----- Message -----  From: James Coleman MD  Sent: 2/24/2025  12:54 PM CST  To: [facility] Transplant Clinical    Why is his tacro so high?  Results reviewed  ----- Message -----  From: [facility]  Sent: 2/24/2025  10:33 AM CST  To: James Coleman MD

## 2025-02-26 RX ORDER — OXYCODONE AND ACETAMINOPHEN 10; 325 MG/1; MG/1
1 TABLET ORAL EVERY 8 HOURS PRN
Qty: 90 TABLET | Refills: 0 | Status: SHIPPED | OUTPATIENT
Start: 2025-02-26 | End: 2025-03-28

## 2025-02-27 ENCOUNTER — TELEPHONE (OUTPATIENT)
Dept: FAMILY MEDICINE | Facility: CLINIC | Age: 70
End: 2025-02-27
Payer: MEDICARE

## 2025-02-27 DIAGNOSIS — Z79.4 TYPE 2 DIABETES MELLITUS WITH DIABETIC POLYNEUROPATHY, WITH LONG-TERM CURRENT USE OF INSULIN: ICD-10-CM

## 2025-02-27 DIAGNOSIS — E11.42 TYPE 2 DIABETES MELLITUS WITH DIABETIC POLYNEUROPATHY, WITH LONG-TERM CURRENT USE OF INSULIN: ICD-10-CM

## 2025-02-27 RX ORDER — BLOOD-GLUCOSE SENSOR
EACH MISCELLANEOUS
Qty: 3 EACH | Refills: 11 | Status: SHIPPED | OUTPATIENT
Start: 2025-02-27

## 2025-02-27 NOTE — TELEPHONE ENCOUNTER
Medications Ordered This Encounter   Medications    blood-glucose sensor (DEXCOM G7 SENSOR) Viviane     Sig: Use as directed.     Dispense:  3 each     Refill:  11       [facility]/pharmacy #[id number] - [place] - [address]  [address] [address]  Phone: [phone] Fax: [phone]

## 2025-03-01 NOTE — TELEPHONE ENCOUNTER
Refill Routing Note   Medication(s) are not appropriate for processing by [facility] for the following reason(s):        Required labs outdated    [facility] action(s):  Defer             Appointments  past 12m or future 3m with PCP    Date Provider   Last Visit   2/24/2025 Emanuel Lopez MD   Next Visit   3/17/2025 Emanuel Lopez MD   ED visits in past 90 days: 1        Note composed:10:39 AM 03/01/2025

## 2025-03-01 NOTE — TELEPHONE ENCOUNTER
No care due was identified.  [facility] Embedded Care Due Messages. Reference number: [id number].   3/01/2025 7:22:09 AM CST

## 2025-03-02 RX ORDER — ALLOPURINOL 100 MG/1
100 TABLET ORAL 2 TIMES DAILY
Qty: 180 TABLET | Refills: 3 | Status: SHIPPED | OUTPATIENT
Start: 2025-03-02

## 2025-03-03 ENCOUNTER — TELEPHONE (OUTPATIENT)
Dept: NEUROSURGERY | Facility: CLINIC | Age: 70
End: 2025-03-03
Payer: MEDICARE

## 2025-03-03 NOTE — TELEPHONE ENCOUNTER
Patient called to get a refill request I stated to him that it was sent to the pharmacy. Patient voiced understanding.

## 2025-03-05 ENCOUNTER — HOSPITAL ENCOUNTER (EMERGENCY)
Facility: HOSPITAL | Age: 70
Discharge: HOME | End: 2025-03-05
Attending: EMERGENCY MEDICINE
Payer: MEDICARE

## 2025-03-05 VITALS
TEMPERATURE: 98 F | RESPIRATION RATE: 18 BRPM | SYSTOLIC BLOOD PRESSURE: 188 MMHG | OXYGEN SATURATION: 98 % | WEIGHT: 258.63 LBS | BODY MASS INDEX: 33.19 KG/M2 | HEART RATE: 77 BPM | DIASTOLIC BLOOD PRESSURE: 95 MMHG | HEIGHT: 74 IN

## 2025-03-05 DIAGNOSIS — M54.16 LUMBAR RADICULOPATHY: Primary | ICD-10-CM

## 2025-03-05 PROCEDURE — 96372 THER/PROPH/DIAG INJ SC/IM: CPT | Performed by: PHYSICIAN ASSISTANT

## 2025-03-05 PROCEDURE — 25000003 PHARM REV CODE 250: Performed by: PHYSICIAN ASSISTANT

## 2025-03-05 PROCEDURE — 63600175 PHARM REV CODE 636 W HCPCS: Performed by: PHYSICIAN ASSISTANT

## 2025-03-05 PROCEDURE — 99284 EMERGENCY DEPT VISIT MOD MDM: CPT | Mod: 25

## 2025-03-05 RX ORDER — LIDOCAINE 50 MG/G
1 PATCH TOPICAL
Status: DISCONTINUED | OUTPATIENT
Start: 2025-03-05 | End: 2025-03-05 | Stop reason: HOSPADM

## 2025-03-05 RX ORDER — DEXAMETHASONE SODIUM PHOSPHATE 4 MG/ML
8 INJECTION, SOLUTION INTRA-ARTICULAR; INTRALESIONAL; INTRAMUSCULAR; INTRAVENOUS; SOFT TISSUE
Status: COMPLETED | OUTPATIENT
Start: 2025-03-05 | End: 2025-03-05

## 2025-03-05 RX ORDER — OXYCODONE AND ACETAMINOPHEN 10; 325 MG/1; MG/1
1 TABLET ORAL
Refills: 0 | Status: COMPLETED | OUTPATIENT
Start: 2025-03-05 | End: 2025-03-05

## 2025-03-05 RX ADMIN — LIDOCAINE 1 PATCH: 50 PATCH CUTANEOUS at 11:03

## 2025-03-05 RX ADMIN — OXYCODONE AND ACETAMINOPHEN 1 TABLET: 325; 10 TABLET ORAL at 11:03

## 2025-03-05 RX ADMIN — DEXAMETHASONE SODIUM PHOSPHATE 8 MG: 4 INJECTION INTRA-ARTICULAR; INTRALESIONAL; INTRAMUSCULAR; INTRAVENOUS; SOFT TISSUE at 11:03

## 2025-03-05 NOTE — ED PROVIDER NOTES
Encounter Date: 3/5/2025       History     Chief Complaint   Patient presents with    Back Pain     Denies injury     The history is provided by the patient and medical records. No  was used.       Vick Gonzalez is a 69 y.o. male with medical history of liver transplant, chronic lower back pain, anxiety, sciatica, CKD 3, sacroiliac joint injections, laminectomy with neurostimulator insertion, chronic pain syndrome presenting to the ED with the chief complaint of back pain.     Reports lower back pain for the past month. Pain wraps around to both of his hips and travels down his left lower extremity. Pain is worse whenever he stands up. He ambulates with a cane at his baseline. Denies lower extremity numbness or weakness. No b/b dysfunction. No fever or history of IVDU. No recent falls or trauma. He is prescribed Percocet 10-325mg and Gabapentin for pain. He is currently out of percocet and due for a refill tomorrow at [facility]. No recent steroids.     Review of patient's allergies indicates:  No Known Allergies  Past Medical History:   Diagnosis Date    Acute congestive heart failure 5/12/2023    Anemia     Anxiety     Cataract     Chronic pain syndrome 07/13/2011    CKD (chronic kidney disease), stage III     Coronary artery disease involving native coronary artery of native heart with angina pectoris 8/24/2023    Diabetes mellitus type II, uncontrolled     Discitis of lumbosacral region 01/16/2015    ED (erectile dysfunction)     Encounter for blood transfusion     Genital herpes     Gout, arthritis     History of alcohol abuse     History of hepatitis C, s/p successful Rx w/ SVR24 (cure) - 5/2018 08/23/2011    Completed 24wks Epclusa + RBV w/SVR12 - 2/2018  -     History of positive PPD, treatment status unknown     Pulmonary granulomas, negative sputum cultures for AFB and indeterminate quantferon test    History of substance abuse     Hypertension     Hypothyroidism      Liver replaced by transplant 08/23/2011    DATE: 12/16/2013  LIVER BIOPSY : REASON:  hep C staging  PATHOLOGY COMMITTEE NOTE/PLAN: :grade  1 / stage 1        Pancreatitis 2016    Peptic ulcer disease     Pulmonary emphysema, unspecified emphysema type 5/26/2023     Past Surgical History:   Procedure Laterality Date    ANTERIOR CERVICAL DISCECTOMY W/ FUSION N/A 8/22/2022    Procedure: Procedure: ACDF 4-7 LOS: 4.0 Anesthesia: General Blood: Type & Screen Radiology: C-Arm Microscope: ------- SNS: EMG, SEP, MEP Brace: Edna Bed: Regular Bed, Shoulder Strap Headrest:------ Position: Supine Equipment: Depuy;  Surgeon: Titi Christian MD;  Location: [facility] OR;  Service: Neurosurgery;  Laterality: N/A;  Depuy  confirmed CW 8/19  Neuro monitoring confirmed CW 8/19    CARPAL TUNNEL RELEASE Left 10/29/2021    Procedure: RELEASE, CARPAL TUNNEL;  Surgeon: Jameson Alejo Jr., MD;  Location: [facility] OR;  Service: Orthopedics;  Laterality: Left;    CHOLECYSTECTOMY      CORONARY ANGIOGRAPHY N/A 8/2/2023    Procedure: ANGIOGRAM, CORONARY ARTERY;  Surgeon: Juan Vera MD;  Location: [facility] CATH LAB/EP;  Service: Cardiology;  Laterality: N/A;    DECOMPRESSION OF CERVICAL SPINE BY ANTERIOR APPROACH WITH FUSION  8/22/2022    Procedure: DECOMPRESSION AND FUSION, SPINE, CERVICAL, ANTERIOR APPROACH;  Surgeon: Titi Christian MD;  Location: [facility] OR;  Service: Neurosurgery;;    INJECTION OF JOINT Right 12/2/2019    Procedure: INJECTION, JOINT SI;  Surgeon: Thu Penn MD;  Location: [facility] PAIN MGT;  Service: Pain Management;  Laterality: Right;  RT SI JNT INJ    INSERTION OF SPINAL NEUROSTIMULATOR N/A 9/3/2024    Procedure: INSERTION, NEUROSTIMULATOR, SPINAL;  Surgeon: Khadar Payne MD;  Location: [facility] OR;  Service: Neurosurgery;  Laterality: N/A;  Procedure:C1-2 generator placement  Length of procedure:1.5 hours  LOS: 0-1 nights  Anesthesia:General  Blood:Type and screen  Radiology:C-arm  Bed:Regular  Bed  Headrest:Denny  Position:Prone  Equipment:Giraldo-Doc Bob    LAMINECTOMY, SPINE, FOR NEUROSTIMULATOR ELECTRODE INSERTION N/A 8/29/2024    Procedure: LAMINECTOMY, SPINE, FOR NEUROSTIMULATOR ELECTRODE INSERTION;  Surgeon: Khadar Payne MD;  Location: [facility] OR;  Service: Neurosurgery;  Laterality: N/A;  Procedure: C1-2 laminectomy for paddle lead placement for spinal cord stimulator trial, extension   Length of procedure: 2 hours  LOS: 0 nights  Anesthesia:General  Blood:Type and screen  Radiology:C-arm  SNS:EMG,SEP,MEP  Position:Pro    LEFT HEART CATHETERIZATION Left 8/2/2023    Procedure: Left heart cath;  Surgeon: Juan Vera MD;  Location: [facility] CATH LAB/EP;  Service: Cardiology;  Laterality: Left;    LIVER TRANSPLANT  06/2010    SPINE SURGERY      TRANSFORAMINAL EPIDURAL INJECTION OF STEROID Left 5/29/2023    Procedure: INJECTION, STEROID, EPIDURAL, TRANSFORAMINAL APPROACH,  LEFT C7-T1 DIRECT REF;  Surgeon: Thu Penn MD;  Location: [facility] PAIN MGT;  Service: Pain Management;  Laterality: Left;  4/3 MD ILL/PT WILL C/B     Family History   Problem Relation Name Age of Onset    Cancer Mother      Diabetes Mother      Heart disease Mother      Diabetes Sister      Cancer Maternal Uncle  82        colon CA    Drug abuse Daughter      Melanoma Neg Hx      Psoriasis Neg Hx      Lupus Neg Hx      Eczema Neg Hx      Acne Neg Hx       Social History[1]  Review of Systems   Musculoskeletal:  Positive for back pain.       Physical Exam     Initial Vitals [03/05/25 1012]   BP Pulse Resp Temp SpO2   (!) 188/95 77 20 97.7 °F (36.5 °C) 98 %      MAP       --         Physical Exam    Constitutional: He appears well-developed and well-nourished. He is not diaphoretic. He is easily aroused.   HENT:   Head: Normocephalic and atraumatic. Mouth/Throat: Oropharynx is clear and moist. No oropharyngeal exudate.   Eyes: EOM and lids are normal. Pupils are equal, round, and reactive to light. No scleral icterus.   Neck:  Phonation normal. Neck supple. No stridor present.   Normal range of motion.  Cardiovascular:  Normal rate and regular rhythm.           +2 DP pulses   Pulmonary/Chest: Breath sounds normal. No stridor. No respiratory distress. He has no wheezes. He has no rales.   Abdominal: Abdomen is soft. He exhibits no distension. There is no abdominal tenderness.   No CVA tenderness There is no rebound.   Musculoskeletal:         General: Tenderness present. No edema. Normal range of motion.      Cervical back: Normal range of motion and neck supple.      Comments: TTP L lower back. No midline spinal tenderness     Neurological: He is alert, oriented to person, place, and time and easily aroused. He has normal strength. No sensory deficit.   No focal weakness or sensory deficit in BLE   Skin: Skin is warm and dry. No rash noted. No erythema.   Psychiatric: He has a normal mood and affect. His speech is normal.         ED Course   Procedures  Labs Reviewed - No data to display         Imaging Results    None          Medications   oxyCODONE-acetaminophen  mg per tablet 1 tablet (1 tablet Oral Given 3/5/25 1112)   dexAMETHasone injection 8 mg (8 mg Intramuscular Given 3/5/25 1112)     Medical Decision Making  69 y.o. male with medical history of liver transplant, chronic lower back pain, anxiety, sciatica, CKD 3, sacroiliac joint injections, laminectomy with neurostimulator insertion, chronic pain syndrome presenting to the ED c/o lower back pain for the past month.     Etiology most consistent with lumbar radiculopathy. He is neurovascularly intact. No red flags concerning for AAA, epidural abscess, cauda equina. No falls or trauma and do not suspect vertebral fracture or other traumatic injury. He is currently out of his Percocet and due for a refill tomorrow. Will give 1 time PO dose in the ED as well as decadron IM and lidoderm patch. He has an appointment with his NSG team next week which I advised him to keep. Do not  feel emergent labs or imaging needed at this time. Patient expresses understanding and agreeable to the plan. Return to ED precautions given for new, worsening, or concerning symptoms.      Risk  Prescription drug management.                                      Clinical Impression:  Final diagnoses:  [M54.16] Lumbar radiculopathy (Primary)          ED Disposition Condition    Discharge Stable          ED Prescriptions    None       Follow-up Information       Follow up With Specialties Details Why Contact Info Additional Information    [facility] Neuro[facility] 8th Fl Neurosurgery   [address]  [facility] [address] [phone] 8th Floor [facility] [place] Please park in [facility] [address]. Check in desk is located in the lobby. Please take the C elevator to 8th floor which opens to the lobby.                 [1]   Social History  Tobacco Use    Smoking status: Former     Passive exposure: Never    Smokeless tobacco: Never   Substance Use Topics    Alcohol use: No     Comment: over 5 years ago, none currently    Drug use: Not Currently     Comment: Former cocaine use        Sundar Santizo PA-C  03/05/25 1551

## 2025-03-05 NOTE — ED NOTES
Patient states lower back pain x 3 weeks, seen in ED recently for same., no pain meds today, denies new injury

## 2025-03-05 NOTE — ED NOTES
Discharge home with family, states understanding to follow up as directed. Ambulates out of ED without difficulty. RX given, To lobby for shot time

## 2025-03-05 NOTE — DISCHARGE INSTRUCTIONS
Continue your home pain medications and follow-up with our Neurosurgery team for further evaluation.    Return to the emergency room for new, worsening, or concerning symptoms.     Future Appointments   Date Time Provider Department Center   3/10/2025  7:20 AM APPOINTMENT LABJEFF [facility] LAB [facility]   3/12/2025  2:00 PM Khadar Payne MD [facility] NEUROSU [facility]   3/17/2025  1:00 PM Emanuel Lopez MD [facility] MED [facility]   3/24/2025  8:00 AM APPOINTMENT LABJEFF [facility] LAB [facility]   5/5/2025 11:00 AM [facility] OIC-US1 MASTER [facility] ULTR [facility]

## 2025-03-05 NOTE — ED NOTES
Patient identifiers verified and correct for Mr Gonzalez  C/C: Back pain SEE NN  APPEARANCE: awake and alert in NAD. PAIN  */10  SKIN: warm, dry and intact. No breakdown or bruising.  MUSCULOSKELETAL: Patient moving all extremities spontaneously, no obvious swelling or deformities noted. Ambulates independently with cane    RESPIRATORY: Denies shortness of breath.Respirations unlabored.   CARDIAC: Denies CP, 2+ distal pulses; no peripheral edema  ABDOMEN: S/ND/NT, Denies nausea  : voids spontaneously, denies difficulty  Neurologic: AAO x 4; follows commands equal strength in all extremities; denies numbness/tingling. Denies dizziness  denies new weakness, reports low back pain all over

## 2025-03-05 NOTE — ED NOTES
Patient requests BG check, DP=[id number], patient states he did not take insulin today yet, PA awawrbraxton

## 2025-03-07 ENCOUNTER — HOSPITAL ENCOUNTER (EMERGENCY)
Facility: HOSPITAL | Age: 70
Discharge: HOME | End: 2025-03-07
Attending: EMERGENCY MEDICINE
Payer: MEDICARE

## 2025-03-07 VITALS
BODY MASS INDEX: 30.8 KG/M2 | OXYGEN SATURATION: 98 % | DIASTOLIC BLOOD PRESSURE: 81 MMHG | HEART RATE: 66 BPM | HEIGHT: 74 IN | SYSTOLIC BLOOD PRESSURE: 123 MMHG | TEMPERATURE: 98 F | RESPIRATION RATE: 18 BRPM | WEIGHT: 240 LBS

## 2025-03-07 DIAGNOSIS — G89.29 CHRONIC BILATERAL LOW BACK PAIN WITH BILATERAL SCIATICA: Primary | ICD-10-CM

## 2025-03-07 DIAGNOSIS — M54.41 CHRONIC BILATERAL LOW BACK PAIN WITH BILATERAL SCIATICA: Primary | ICD-10-CM

## 2025-03-07 DIAGNOSIS — N17.9 AKI (ACUTE KIDNEY INJURY): ICD-10-CM

## 2025-03-07 DIAGNOSIS — M54.42 CHRONIC BILATERAL LOW BACK PAIN WITH BILATERAL SCIATICA: Primary | ICD-10-CM

## 2025-03-07 LAB
ALBUMIN SERPL BCP-MCNC: 3.8 G/DL (ref 3.5–5.2)
ALP SERPL-CCNC: 72 U/L (ref 40–150)
ALT SERPL W/O P-5'-P-CCNC: 20 U/L (ref 10–44)
ANION GAP SERPL CALC-SCNC: 10 MMOL/L (ref 8–16)
AST SERPL-CCNC: 29 U/L (ref 10–40)
BASOPHILS # BLD AUTO: 0.04 K/UL (ref 0–0.2)
BASOPHILS NFR BLD: 0.3 % (ref 0–1.9)
BILIRUB SERPL-MCNC: 0.3 MG/DL (ref 0.1–1)
BUN SERPL-MCNC: 27 MG/DL (ref 8–23)
CALCIUM SERPL-MCNC: 9.1 MG/DL (ref 8.7–10.5)
CHLORIDE SERPL-SCNC: 105 MMOL/L (ref 95–110)
CO2 SERPL-SCNC: 25 MMOL/L (ref 23–29)
CREAT SERPL-MCNC: 1.5 MG/DL (ref 0.5–1.4)
CRP SERPL-MCNC: 6 MG/L (ref 0–8.2)
DIFFERENTIAL METHOD BLD: ABNORMAL
EOSINOPHIL # BLD AUTO: 0.4 K/UL (ref 0–0.5)
EOSINOPHIL NFR BLD: 3.8 % (ref 0–8)
ERYTHROCYTE [DISTWIDTH] IN BLOOD BY AUTOMATED COUNT: 13.1 % (ref 11.5–14.5)
EST. GFR  (NO RACE VARIABLE): 50.7 ML/MIN/1.73 M^2
GLUCOSE SERPL-MCNC: 167 MG/DL (ref 70–110)
HCT VFR BLD AUTO: 40.3 % (ref 40–54)
HGB BLD-MCNC: 12.9 G/DL (ref 14–18)
IMM GRANULOCYTES # BLD AUTO: 0.03 K/UL (ref 0–0.04)
IMM GRANULOCYTES NFR BLD AUTO: 0.3 % (ref 0–0.5)
LYMPHOCYTES # BLD AUTO: 4.8 K/UL (ref 1–4.8)
LYMPHOCYTES NFR BLD: 40.8 % (ref 18–48)
MCH RBC QN AUTO: 28.7 PG (ref 27–31)
MCHC RBC AUTO-ENTMCNC: 32 G/DL (ref 32–36)
MCV RBC AUTO: 90 FL (ref 82–98)
MONOCYTES # BLD AUTO: 0.9 K/UL (ref 0.3–1)
MONOCYTES NFR BLD: 7.6 % (ref 4–15)
NEUTROPHILS # BLD AUTO: 5.6 K/UL (ref 1.8–7.7)
NEUTROPHILS NFR BLD: 47.2 % (ref 38–73)
NRBC BLD-RTO: 0 /100 WBC
PLATELET # BLD AUTO: 181 K/UL (ref 150–450)
PMV BLD AUTO: 11.9 FL (ref 9.2–12.9)
POTASSIUM SERPL-SCNC: 4.1 MMOL/L (ref 3.5–5.1)
PROT SERPL-MCNC: 7.7 G/DL (ref 6–8.4)
RBC # BLD AUTO: 4.5 M/UL (ref 4.6–6.2)
SODIUM SERPL-SCNC: 140 MMOL/L (ref 136–145)
WBC # BLD AUTO: 11.73 K/UL (ref 3.9–12.7)

## 2025-03-07 PROCEDURE — 99284 EMERGENCY DEPT VISIT MOD MDM: CPT | Mod: 25

## 2025-03-07 PROCEDURE — 25000003 PHARM REV CODE 250: Performed by: EMERGENCY MEDICINE

## 2025-03-07 PROCEDURE — 85025 COMPLETE CBC W/AUTO DIFF WBC: CPT

## 2025-03-07 PROCEDURE — 25000003 PHARM REV CODE 250

## 2025-03-07 PROCEDURE — 86140 C-REACTIVE PROTEIN: CPT

## 2025-03-07 PROCEDURE — 80053 COMPREHEN METABOLIC PANEL: CPT

## 2025-03-07 RX ORDER — OXYCODONE HYDROCHLORIDE 5 MG/1
5 TABLET ORAL
Refills: 0 | Status: COMPLETED | OUTPATIENT
Start: 2025-03-07 | End: 2025-03-07

## 2025-03-07 RX ORDER — METHYLPREDNISOLONE 4 MG/1
TABLET ORAL
Qty: 21 EACH | Refills: 0 | Status: SHIPPED | OUTPATIENT
Start: 2025-03-07 | End: 2025-03-28

## 2025-03-07 RX ORDER — METHYLPREDNISOLONE 4 MG/1
TABLET ORAL
Qty: 1 EACH | Refills: 0 | Status: SHIPPED | OUTPATIENT
Start: 2025-03-07 | End: 2025-03-07

## 2025-03-07 RX ORDER — ACETAMINOPHEN 500 MG
1000 TABLET ORAL
Status: DISCONTINUED | OUTPATIENT
Start: 2025-03-07 | End: 2025-03-07

## 2025-03-07 RX ADMIN — OXYCODONE 5 MG: 5 TABLET ORAL at 08:03

## 2025-03-07 NOTE — DISCHARGE INSTRUCTIONS
You were evaluated in the emergency department today for low back pain.  Although there were no findings of concern to necessitate admission to the hospital or warrant immediate surgical intervention, disease exists on a spectrum and your disease process may progress.  If this is the case, please watch your symptoms and return to the emergency department if you feel worse and are unable to discuss care with your primary care doctor in follow up in the next several days.  Specific information regarding your complaint has been provided.  Thank you for choosing [facility]!  Drink plenty of fluids.  Follow up with your PCP within 1-2 weeks for resolution of your kidney injury.  Continue with all previously scheduled appointments.

## 2025-03-07 NOTE — ED TRIAGE NOTES
Pt presents to the ED complaining of LLE sciatic nerve pain. Pt denies chest pain, SOB, N/V/D. Pt states he was seen here a few days ago and was given a steroid injection. Pt states the pain has returned and has spread to both of his hips as well as his back.

## 2025-03-07 NOTE — ED PROVIDER NOTES
Encounter Date: 3/7/2025       History     Chief Complaint   Patient presents with    Leg Pain     Sciatic pain left leg      69-year-old male with a past medical history CHF, CKD, diabetes, liver transplant, lumbar laminectomy and cervical spinal stimulator (2024) presenting for evaluation of low back pain and leg pain.  Patient reports pain in his back, buttocks, and thighs bilaterally.  Patient states the pain has been ongoing for weeks.  Patient has been seen in the ED twice over the past month for the same complaints; the 1st time he received pain medications and discharge, the 2nd time he received a steroid injection and discharge.  The most recent visit was 2 days ago.  Patient denies fevers/chills, numbness/tingling/weakness of his lower extremities, loss of control of his bowel or his bladder.  Patient denies any trauma.    The history is provided by the patient and medical records. No  was used.     Review of patient's allergies indicates:  No Known Allergies  Past Medical History:   Diagnosis Date    Acute congestive heart failure 5/12/2023    Anemia     Anxiety     Cataract     Chronic pain syndrome 07/13/2011    CKD (chronic kidney disease), stage III     Coronary artery disease involving native coronary artery of native heart with angina pectoris 8/24/2023    Diabetes mellitus type II, uncontrolled     Discitis of lumbosacral region 01/16/2015    ED (erectile dysfunction)     Encounter for blood transfusion     Genital herpes     Gout, arthritis     History of alcohol abuse     History of hepatitis C, s/p successful Rx w/ SVR24 (cure) - 5/2018 08/23/2011    Completed 24wks Epclusa + RBV w/SVR12 - 2/2018  -     History of positive PPD, treatment status unknown     Pulmonary granulomas, negative sputum cultures for AFB and indeterminate quantferon test    History of substance abuse     Hypertension     Hypothyroidism     Liver replaced by transplant 08/23/2011    DATE: 12/16/2013   LIVER BIOPSY : REASON:  hep C staging  PATHOLOGY COMMITTEE NOTE/PLAN: :grade  1 / stage 1        Pancreatitis 2016    Peptic ulcer disease     Pulmonary emphysema, unspecified emphysema type 5/26/2023     Past Surgical History:   Procedure Laterality Date    ANTERIOR CERVICAL DISCECTOMY W/ FUSION N/A 8/22/2022    Procedure: Procedure: ACDF 4-7 LOS: 4.0 Anesthesia: General Blood: Type & Screen Radiology: C-Arm Microscope: ------- SNS: EMG, SEP, MEP Brace: [place] Bed: Regular Bed, Shoulder Strap Headrest:------ Position: Supine Equipment: Depuy;  Surgeon: Titi Christian MD;  Location: [facility] OR;  Service: Neurosurgery;  Laterality: N/A;  Depuy  confirmed CW 8/19  Neuro monitoring confirmed CW 8/19    CARPAL TUNNEL RELEASE Left 10/29/2021    Procedure: RELEASE, CARPAL TUNNEL;  Surgeon: Jameson Alejo Jr., MD;  Location: [facility] OR;  Service: Orthopedics;  Laterality: Left;    CHOLECYSTECTOMY      CORONARY ANGIOGRAPHY N/A 8/2/2023    Procedure: ANGIOGRAM, CORONARY ARTERY;  Surgeon: Juan Vera MD;  Location: [facility] CATH LAB/EP;  Service: Cardiology;  Laterality: N/A;    DECOMPRESSION OF CERVICAL SPINE BY ANTERIOR APPROACH WITH FUSION  8/22/2022    Procedure: DECOMPRESSION AND FUSION, SPINE, CERVICAL, ANTERIOR APPROACH;  Surgeon: Titi Christian MD;  Location: [facility] OR;  Service: Neurosurgery;;    INJECTION OF JOINT Right 12/2/2019    Procedure: INJECTION, JOINT SI;  Surgeon: Thu Penn MD;  Location: [facility] PAIN MGT;  Service: Pain Management;  Laterality: Right;  RT SI JNT INJ    INSERTION OF SPINAL NEUROSTIMULATOR N/A 9/3/2024    Procedure: INSERTION, NEUROSTIMULATOR, SPINAL;  Surgeon: Khadar Payne MD;  Location: [facility] OR;  Service: Neurosurgery;  Laterality: N/A;  Procedure:C1-2 generator placement  Length of procedure:1.5 hours  LOS: 0-1 nights  Anesthesia:General  Blood:Type and screen  Radiology:C-arm  Bed:Regular Bed  Headrest:[place]  Position:Prone  Equipment:Giraldo-Doc Bob    LAMINECTOMY, SPINE, FOR  NEUROSTIMULATOR ELECTRODE INSERTION N/A 8/29/2024    Procedure: LAMINECTOMY, SPINE, FOR NEUROSTIMULATOR ELECTRODE INSERTION;  Surgeon: Khadar Payne MD;  Location: [facility] OR;  Service: Neurosurgery;  Laterality: N/A;  Procedure: C1-2 laminectomy for paddle lead placement for spinal cord stimulator trial, extension   Length of procedure: 2 hours  LOS: 0 nights  Anesthesia:General  Blood:Type and screen  Radiology:C-arm  SNS:EMG,SEP,MEP  Position:Pro    LEFT HEART CATHETERIZATION Left 8/2/2023    Procedure: Left heart cath;  Surgeon: Juan Vera MD;  Location: [facility] CATH LAB/EP;  Service: Cardiology;  Laterality: Left;    LIVER TRANSPLANT  06/2010    SPINE SURGERY      TRANSFORAMINAL EPIDURAL INJECTION OF STEROID Left 5/29/2023    Procedure: INJECTION, STEROID, EPIDURAL, TRANSFORAMINAL APPROACH,  LEFT C7-T1 DIRECT REF;  Surgeon: Thu Penn MD;  Location: [facility] PAIN MGT;  Service: Pain Management;  Laterality: Left;  4/3 MD ILL/PT WILL C/B     Family History   Problem Relation Name Age of Onset    Cancer Mother      Diabetes Mother      Heart disease Mother      Diabetes Sister      Cancer Maternal Uncle  82        colon CA    Drug abuse Daughter      Melanoma Neg Hx      Psoriasis Neg Hx      Lupus Neg Hx      Eczema Neg Hx      Acne Neg Hx       Social History[1]      Physical Exam     Initial Vitals [03/07/25 0650]   BP Pulse Resp Temp SpO2   (!) 187/100 86 18 98.8 °F (37.1 °C) 98 %      MAP       --         Physical Exam    Nursing note and vitals reviewed.  Constitutional: He appears well-developed and well-nourished.   HENT:   Head: Normocephalic and atraumatic.   Eyes: Conjunctivae and EOM are normal. Pupils are equal, round, and reactive to light.   Cardiovascular:  Normal rate, regular rhythm and normal heart sounds.           Pulmonary/Chest: Breath sounds normal. No respiratory distress. He has no wheezes. He has no rhonchi. He has no rales.   Abdominal: Abdomen is soft. There is no abdominal  tenderness.   Musculoskeletal:      Comments: No lower extremity edema  Point tenderness over the sacrum without deformity, erythema, or swelling  Negative straight leg raise bilaterally     Neurological: He is alert and oriented to person, place, and time.   Ambulates with cane at baseline; ambulatory with cane today  Moving all extremities spontaneously         ED Course   Procedures  Labs Reviewed   CBC W/ AUTO DIFFERENTIAL - Abnormal       Result Value    WBC 11.73      RBC 4.50 (*)     Hemoglobin 12.9 (*)     Hematocrit 40.3      MCV 90      MCH 28.7      MCHC 32.0      RDW 13.1      Platelets 181      MPV 11.9      Immature Granulocytes 0.3      Gran # (ANC) 5.6      Immature Grans (Abs) 0.03      Lymph # 4.8      Mono # 0.9      Eos # 0.4      Baso # 0.04      nRBC 0      Gran % 47.2      Lymph % 40.8      Mono % 7.6      Eosinophil % 3.8      Basophil % 0.3      Differential Method Automated     COMPREHENSIVE METABOLIC PANEL - Abnormal    Sodium 140      Potassium 4.1      Chloride 105      CO2 25      Glucose 167 (*)     BUN 27 (*)     Creatinine 1.5 (*)     Calcium 9.1      Total Protein 7.7      Albumin 3.8      Total Bilirubin 0.3      Alkaline Phosphatase 72      AST 29      ALT 20      eGFR 50.7 (*)     Anion Gap 10     C-REACTIVE PROTEIN    CRP 6.0            Imaging Results              X-Ray Sacrum And Coccyx (Final result)  Result time 03/07/25 09:43:56      Final result by Paco Mendez MD (03/07/25 09:43:56)                   Impression:      No convincing evidence of acute displaced fracture or dislocation.      Electronically signed by: Paco Mendez  Date:    03/07/2025  Time:    09:43               Narrative:    EXAMINATION:  XR SACRUM AND COCCYX    CLINICAL HISTORY:  Low back pain, unspecified    TECHNIQUE:  Two or three views of the sacrum and coccyx were performed.    COMPARISON:  None    FINDINGS:  Frontal view limited by patient body habitus.    Noting this, no convincing evidence  of acute displaced fracture or dislocation.  Visualized joint spaces appear grossly maintained.  Postoperative degenerative findings in the lumbar spine, as reported on separate same date dedicated lumbar spine radiograph.                                       X-Ray Lumbar Spine Ap And Lateral (Final result)  Result time 03/07/25 08:18:02      Final result by Paco Mendez MD (03/07/25 08:18:02)                   Impression:      No convincing evidence of acute displaced fracture or traumatic subluxation.  No definite hardware complication.      Electronically signed by: Paco Mendez  Date:    03/07/2025  Time:    08:18               Narrative:    EXAMINATION:  XR LUMBAR SPINE AP AND LATERAL    CLINICAL HISTORY:  sacral tenderness, back pain;    TECHNIQUE:  AP, lateral and spot images were performed of the lumbar spine.    COMPARISON:  Lumbar spine radiograph performed 02/14/2023.    FINDINGS:  Five non-rib-bearing lumbar type vertebra.  Redemonstrated operative sequela of posterior instrumented and interbody fusion at L5-S1.  No definite hardware complication appreciated.    No evidence of acute fracture or dislocation.  Modest degenerative endplate and facet sclerosis.    No acute findings in the visualized portions of the abdomen pelvis.  Left charmaine device projects over the left upper quadrant                                       Medications   oxyCODONE immediate release tablet 5 mg (5 mg Oral Given 3/7/25 0807)   oxyCODONE immediate release tablet 5 mg (5 mg Oral Given 3/7/25 0840)     Medical Decision Making  69-year-old male with a past medical history CHF, CKD, diabetes, liver transplant, lumbar laminectomy and cervical spinal stimulator (2024) presenting for evaluation of low back pain and leg pain.    Differential diagnosis includes, but is not limited to, spinal epidural abscess, conus medullaris, cauda equina syndrome, musculoskeletal pain, sciatica, chronic back pain, chronic pain syndrome.    In  emergency department, patient hemodynamically stable.  Mild distress secondary to pain.  Patient able to ambulate with cane, which is patient's baseline.  Patient with point tenderness over the sacrum, but no swelling, erythema or other signs abscess or infection.  Patient without numbness/tingling/weakness in lower extremities that would be concerning for conus medullaris or cauda equina syndrome.  No red flag symptoms of back pain.  No fevers that would be concerning for spinal epidural abscess, but patient with history of liver transplant and diabetes, so will assess with labs and inflammatory markers.  Given point tenderness over sacrum, obtained x-ray of coccyx to evaluate for fracture; x-ray negative for fracture.  Patient with scheduled follow up with Neurosurgery in the next several weeks.  We will discharge patient with Medrol Dosepak to bridge patient to neurosurgery appointment.    Labs notable for mild OSMANY, creatinine 1.5 from baseline of 1.2.  Advised patient of these findings and encouraged hydration and follow up with PCP within 1-2 weeks for resolution.  Patient verbalized understanding of and agreement with plan.  Return precautions given.      Amount and/or Complexity of Data Reviewed  Labs: ordered. Decision-making details documented in ED Course.  Radiology: ordered.    Risk  Prescription drug management.  Risk Details: Patient received oxycodone while in the emergency department.  Patient discharged with Medrol Dosepak.               ED Course as of 03/07/25 0955   Fri Mar 07, 2025   0830 CBC auto differential(!)  Anemia, most recent 13.7   No leukocytosis [BH]   0839 Comprehensive metabolic panel(!)  Creatinine and BUN elevated, concerning for OSMANY [BH]   0839 C-reactive protein  Within normal limits, lowering concern for infectious process [BH]   0951 ATTENDING PHYSICIAN ATTESTATION  I have repeated the key portions of the resident's history and physical face to face with the patient, reviewed  and agree with the resident medical documentation except as noted below, and supervised and managed the medical care of the patient.     Subacute on chronic lower back pain.  No red flag symptoms such as bowel or bladder incontinence, numbness, weakness,no current IVDU.  Followed by Neurosurgery and has a history of chronic pain.  No falls or trauma.    Exam is nonfocal with intact strength in the lower extremity intact sensation.  He is able to walk.    Given this is his 3rd visit for similar complaints screening labs obtained with no signs of inflammation.  X-rays obtained as he had some point tenderness in his lumbar/sacrum with no acute findings.    Will control symptoms with his 10 mg of oxycodone and discharge with Medrol Dosepak as he feels steroids do help him with the symptoms.  He has a follow up appointment with Neurosurgery and 5 days.  Mild OSMANY instructed to drink plenty of fluids.  He is not taking NSAIDs.    ED return precautions for any red flag symptoms.    Fish Rudolph MD  Department of Emergency Medicine   [BA]      ED Course User Index  [BA] Fish Rudolph MD  [] Nahid Mijares MD                           Clinical Impression:  Final diagnoses:  [M54.42, M54.41, G89.29] Chronic bilateral low back pain with bilateral sciatica (Primary)  [N17.9] OSMANY (acute kidney injury)          ED Disposition Condition    Discharge Stable          ED Prescriptions       Medication Sig Dispense Start Date End Date Auth. Provider    methylPREDNISolone (MEDROL DOSEPACK) 4 mg tablet Take according to instructions on the package 1 each 3/7/2025 3/28/2025 Nahid Mijares MD          Follow-up Information       Follow up With Specialties Details Why Contact Info    Emanuel Lopez MD Family Medicine Call in 1 week  [address] [facility]  [address]  [place] [address] [phone]                   [1]   Social History  Tobacco Use    Smoking status: Former     Passive exposure: Never    Smokeless tobacco: Never    Substance Use Topics    Alcohol use: No     Comment: over 5 years ago, none currently    Drug use: Not Currently     Comment: Former cocaine use        Nahid Mijares MD  Resident  03/07/25 6737

## 2025-03-12 ENCOUNTER — RESULTS FOLLOW-UP (OUTPATIENT)
Dept: FAMILY MEDICINE | Facility: CLINIC | Age: 70
End: 2025-03-12

## 2025-03-12 ENCOUNTER — LAB VISIT (OUTPATIENT)
Dept: LAB | Facility: HOSPITAL | Age: 70
End: 2025-03-12
Attending: FAMILY MEDICINE
Payer: MEDICARE

## 2025-03-12 ENCOUNTER — TELEPHONE (OUTPATIENT)
Dept: FAMILY MEDICINE | Facility: CLINIC | Age: 70
End: 2025-03-12
Payer: MEDICARE

## 2025-03-12 ENCOUNTER — PROCEDURE VISIT (OUTPATIENT)
Dept: PAIN MEDICINE | Facility: CLINIC | Age: 70
End: 2025-03-12
Payer: MEDICARE

## 2025-03-12 ENCOUNTER — OFFICE VISIT (OUTPATIENT)
Dept: NEUROSURGERY | Facility: CLINIC | Age: 70
End: 2025-03-12
Payer: MEDICARE

## 2025-03-12 VITALS
DIASTOLIC BLOOD PRESSURE: 89 MMHG | SYSTOLIC BLOOD PRESSURE: 169 MMHG | BODY MASS INDEX: 32.99 KG/M2 | WEIGHT: 257.06 LBS | HEIGHT: 74 IN

## 2025-03-12 VITALS
DIASTOLIC BLOOD PRESSURE: 89 MMHG | HEART RATE: 79 BPM | BODY MASS INDEX: 30.81 KG/M2 | WEIGHT: 240.06 LBS | HEIGHT: 74 IN | SYSTOLIC BLOOD PRESSURE: 159 MMHG

## 2025-03-12 DIAGNOSIS — I73.9 PAD (PERIPHERAL ARTERY DISEASE): ICD-10-CM

## 2025-03-12 DIAGNOSIS — G57.72 COMPLEX REGIONAL PAIN SYNDROME TYPE 2 OF LEFT LOWER EXTREMITY: ICD-10-CM

## 2025-03-12 DIAGNOSIS — E11.9 TYPE 2 DIABETES MELLITUS WITHOUT COMPLICATION: ICD-10-CM

## 2025-03-12 DIAGNOSIS — Z79.4 TYPE 2 DIABETES MELLITUS WITH DIABETIC POLYNEUROPATHY, WITH LONG-TERM CURRENT USE OF INSULIN: ICD-10-CM

## 2025-03-12 DIAGNOSIS — M51.369 ADJACENT SEGMENT DISEASE OF LUMBAR SPINE WITH HISTORY OF FUSION PROCEDURE: ICD-10-CM

## 2025-03-12 DIAGNOSIS — K74.60 HEPATIC CIRRHOSIS, UNSPECIFIED HEPATIC CIRRHOSIS TYPE, UNSPECIFIED WHETHER ASCITES PRESENT: ICD-10-CM

## 2025-03-12 DIAGNOSIS — G63 POLYNEUROPATHY ASSOCIATED WITH UNDERLYING DISEASE: ICD-10-CM

## 2025-03-12 DIAGNOSIS — M48.07 SPINAL STENOSIS, LUMBOSACRAL REGION: Primary | ICD-10-CM

## 2025-03-12 DIAGNOSIS — J43.9 PULMONARY EMPHYSEMA, UNSPECIFIED EMPHYSEMA TYPE: ICD-10-CM

## 2025-03-12 DIAGNOSIS — Z12.5 SCREENING FOR MALIGNANT NEOPLASM OF PROSTATE: ICD-10-CM

## 2025-03-12 DIAGNOSIS — Z98.1 ADJACENT SEGMENT DISEASE OF LUMBAR SPINE WITH HISTORY OF FUSION PROCEDURE: ICD-10-CM

## 2025-03-12 DIAGNOSIS — E11.42 TYPE 2 DIABETES MELLITUS WITH DIABETIC POLYNEUROPATHY, WITH LONG-TERM CURRENT USE OF INSULIN: ICD-10-CM

## 2025-03-12 DIAGNOSIS — I10 HYPERTENSION, UNSPECIFIED TYPE: ICD-10-CM

## 2025-03-12 DIAGNOSIS — G89.4 CHRONIC PAIN SYNDROME: ICD-10-CM

## 2025-03-12 DIAGNOSIS — I25.119 CORONARY ARTERY DISEASE INVOLVING NATIVE CORONARY ARTERY OF NATIVE HEART WITH ANGINA PECTORIS: ICD-10-CM

## 2025-03-12 DIAGNOSIS — M96.1 POSTLAMINECTOMY SYNDROME OF LUMBAR REGION: ICD-10-CM

## 2025-03-12 DIAGNOSIS — D69.6 THROMBOCYTOPENIA: ICD-10-CM

## 2025-03-12 DIAGNOSIS — I50.32 CHRONIC HEART FAILURE WITH PRESERVED EJECTION FRACTION: ICD-10-CM

## 2025-03-12 DIAGNOSIS — D84.9 IMMUNOSUPPRESSION: ICD-10-CM

## 2025-03-12 DIAGNOSIS — E11.42 DIABETIC POLYNEUROPATHY ASSOCIATED WITH TYPE 2 DIABETES MELLITUS: ICD-10-CM

## 2025-03-12 DIAGNOSIS — Z94.4 S/P LIVER TRANSPLANT: ICD-10-CM

## 2025-03-12 DIAGNOSIS — M79.18 CERVICAL MYOFASCIAL PAIN SYNDROME: Primary | ICD-10-CM

## 2025-03-12 DIAGNOSIS — Z96.89 SPINAL CORD STIMULATOR STATUS: ICD-10-CM

## 2025-03-12 DIAGNOSIS — M54.2 NECK PAIN ON LEFT SIDE: ICD-10-CM

## 2025-03-12 LAB
ALBUMIN SERPL BCP-MCNC: 3.9 G/DL (ref 3.5–5.2)
ALP SERPL-CCNC: 71 U/L (ref 40–150)
ALT SERPL W/O P-5'-P-CCNC: 22 U/L (ref 10–44)
ANION GAP SERPL CALC-SCNC: 14 MMOL/L (ref 8–16)
AST SERPL-CCNC: 18 U/L (ref 10–40)
BASOPHILS # BLD AUTO: 0.05 K/UL (ref 0–0.2)
BASOPHILS NFR BLD: 0.3 % (ref 0–1.9)
BILIRUB SERPL-MCNC: 0.3 MG/DL (ref 0.1–1)
BUN SERPL-MCNC: 27 MG/DL (ref 8–23)
CALCIUM SERPL-MCNC: 9.2 MG/DL (ref 8.7–10.5)
CHLORIDE SERPL-SCNC: 104 MMOL/L (ref 95–110)
CHOLEST SERPL-MCNC: 174 MG/DL (ref 120–199)
CHOLEST/HDLC SERPL: 3.5 {RATIO} (ref 2–5)
CO2 SERPL-SCNC: 21 MMOL/L (ref 23–29)
COMPLEXED PSA SERPL-MCNC: 0.2 NG/ML (ref 0–4)
CREAT SERPL-MCNC: 1.2 MG/DL (ref 0.5–1.4)
DIFFERENTIAL METHOD BLD: ABNORMAL
EOSINOPHIL # BLD AUTO: 0.3 K/UL (ref 0–0.5)
EOSINOPHIL NFR BLD: 1.8 % (ref 0–8)
ERYTHROCYTE [DISTWIDTH] IN BLOOD BY AUTOMATED COUNT: 13.2 % (ref 11.5–14.5)
EST. GFR  (NO RACE VARIABLE): >60 ML/MIN/1.73 M^2
ESTIMATED AVG GLUCOSE: 151 MG/DL (ref 68–131)
GLUCOSE SERPL-MCNC: 131 MG/DL (ref 70–110)
HBA1C MFR BLD: 6.9 % (ref 4–5.6)
HCT VFR BLD AUTO: 42.1 % (ref 40–54)
HDLC SERPL-MCNC: 50 MG/DL (ref 40–75)
HDLC SERPL: 28.7 % (ref 20–50)
HGB BLD-MCNC: 13.6 G/DL (ref 14–18)
IMM GRANULOCYTES # BLD AUTO: 0.05 K/UL (ref 0–0.04)
IMM GRANULOCYTES NFR BLD AUTO: 0.3 % (ref 0–0.5)
LDLC SERPL CALC-MCNC: 106.2 MG/DL (ref 63–159)
LYMPHOCYTES # BLD AUTO: 5.3 K/UL (ref 1–4.8)
LYMPHOCYTES NFR BLD: 36.1 % (ref 18–48)
MCH RBC QN AUTO: 29.1 PG (ref 27–31)
MCHC RBC AUTO-ENTMCNC: 32.3 G/DL (ref 32–36)
MCV RBC AUTO: 90 FL (ref 82–98)
MONOCYTES # BLD AUTO: 0.8 K/UL (ref 0.3–1)
MONOCYTES NFR BLD: 5.6 % (ref 4–15)
NEUTROPHILS # BLD AUTO: 8.1 K/UL (ref 1.8–7.7)
NEUTROPHILS NFR BLD: 55.9 % (ref 38–73)
NONHDLC SERPL-MCNC: 124 MG/DL
NRBC BLD-RTO: 0 /100 WBC
PLATELET # BLD AUTO: 189 K/UL (ref 150–450)
PMV BLD AUTO: 12 FL (ref 9.2–12.9)
POTASSIUM SERPL-SCNC: 4.1 MMOL/L (ref 3.5–5.1)
PROT SERPL-MCNC: 8 G/DL (ref 6–8.4)
RBC # BLD AUTO: 4.67 M/UL (ref 4.6–6.2)
SODIUM SERPL-SCNC: 139 MMOL/L (ref 136–145)
TRIGL SERPL-MCNC: 89 MG/DL (ref 30–150)
TSH SERPL DL<=0.005 MIU/L-ACNC: 1.76 UIU/ML (ref 0.4–4)
WBC # BLD AUTO: 14.56 K/UL (ref 3.9–12.7)

## 2025-03-12 PROCEDURE — 1159F MED LIST DOCD IN RCRD: CPT | Mod: CPTII,S$GLB,, | Performed by: NEUROLOGICAL SURGERY

## 2025-03-12 PROCEDURE — 99214 OFFICE O/P EST MOD 30 MIN: CPT | Mod: S$GLB,,, | Performed by: NEUROLOGICAL SURGERY

## 2025-03-12 PROCEDURE — 84153 ASSAY OF PSA TOTAL: CPT | Performed by: FAMILY MEDICINE

## 2025-03-12 PROCEDURE — 36415 COLL VENOUS BLD VENIPUNCTURE: CPT | Performed by: FAMILY MEDICINE

## 2025-03-12 PROCEDURE — 3008F BODY MASS INDEX DOCD: CPT | Mod: CPTII,S$GLB,, | Performed by: NEUROLOGICAL SURGERY

## 2025-03-12 PROCEDURE — 84443 ASSAY THYROID STIM HORMONE: CPT | Performed by: FAMILY MEDICINE

## 2025-03-12 PROCEDURE — 3077F SYST BP >= 140 MM HG: CPT | Mod: CPTII,S$GLB,, | Performed by: NEUROLOGICAL SURGERY

## 2025-03-12 PROCEDURE — 1101F PT FALLS ASSESS-DOCD LE1/YR: CPT | Mod: CPTII,S$GLB,, | Performed by: NEUROLOGICAL SURGERY

## 2025-03-12 PROCEDURE — 3044F HG A1C LEVEL LT 7.0%: CPT | Mod: CPTII,S$GLB,, | Performed by: NEUROLOGICAL SURGERY

## 2025-03-12 PROCEDURE — 3288F FALL RISK ASSESSMENT DOCD: CPT | Mod: CPTII,S$GLB,, | Performed by: NEUROLOGICAL SURGERY

## 2025-03-12 PROCEDURE — 3079F DIAST BP 80-89 MM HG: CPT | Mod: CPTII,S$GLB,, | Performed by: NEUROLOGICAL SURGERY

## 2025-03-12 PROCEDURE — 85025 COMPLETE CBC W/AUTO DIFF WBC: CPT | Performed by: FAMILY MEDICINE

## 2025-03-12 PROCEDURE — 1125F AMNT PAIN NOTED PAIN PRSNT: CPT | Mod: CPTII,S$GLB,, | Performed by: NEUROLOGICAL SURGERY

## 2025-03-12 PROCEDURE — 80061 LIPID PANEL: CPT | Performed by: FAMILY MEDICINE

## 2025-03-12 PROCEDURE — 83036 HEMOGLOBIN GLYCOSYLATED A1C: CPT | Performed by: FAMILY MEDICINE

## 2025-03-12 PROCEDURE — 20552 NJX 1/MLT TRIGGER POINT 1/2: CPT | Mod: S$GLB,,, | Performed by: NURSE PRACTITIONER

## 2025-03-12 PROCEDURE — 99499 UNLISTED E&M SERVICE: CPT | Mod: S$GLB,,, | Performed by: NURSE PRACTITIONER

## 2025-03-12 PROCEDURE — 80053 COMPREHEN METABOLIC PANEL: CPT | Performed by: FAMILY MEDICINE

## 2025-03-12 PROCEDURE — 99999 PR PBB SHADOW E&M-EST. PATIENT-LVL IV: CPT | Mod: PBBFAC,,, | Performed by: NEUROLOGICAL SURGERY

## 2025-03-12 RX ORDER — TRIAMCINOLONE ACETONIDE 40 MG/ML
40 INJECTION, SUSPENSION INTRA-ARTICULAR; INTRAMUSCULAR
Status: DISCONTINUED | OUTPATIENT
Start: 2025-03-12 | End: 2025-03-12 | Stop reason: HOSPADM

## 2025-03-12 RX ADMIN — TRIAMCINOLONE ACETONIDE 40 MG: 40 INJECTION, SUSPENSION INTRA-ARTICULAR; INTRAMUSCULAR at 03:03

## 2025-03-12 NOTE — PROGRESS NOTES
NEUROSURGICAL PROGRESS NOTE    DATE OF SERVICE:  03/12/2025    ATTENDING PHYSICIAN:  Khadar Payne MD    SUBJECTIVE:    INTERIM HISTORY:    This is a very pleasant 69 y.o. male, status post cervical fusion, he is more than 6 months status post retrograde C1-2 paddle lead placement for spinal cord stimulation.  He reports complete relief of left arm pain.  He is complaining of left superior trapezius distribution pain.  Pain is reproduced by palpation.  He also complained of bilateral lumbosacral pain radiating towards his hips.  He has a history of L5-S1 fusion.  Recent lumbar spine MRI.  He was scheduled for cervical spine MRI but his remote is not functioning and he was not able to put his stimulator in MRI mode.  He is waiting to received a new remote for a spinal cord stimulator.              PAST MEDICAL HISTORY:  Active Ambulatory Problems     Diagnosis Date Noted    Chronic pain syndrome 07/13/2011    Acquired hypothyroidism     History of hepatitis C, s/p successful Rx w/ [id number] (cure) - 5/2018 08/23/2011    Gout, unspecified 03/14/2011    Substance abuse 10/24/2010    Thrombocytopenia 06/16/2010    Anxiety     Lumbar radiculopathy 04/08/2015    Acquired spondylolisthesis 05/12/2015    Essential hypertension 06/19/2015    Type 2 diabetes mellitus with diabetic polyneuropathy, with long-term current use of insulin 10/04/2016    S/P liver transplant 10/04/2016    Hypertriglyceridemia 10/05/2016    CKD (chronic kidney disease), stage III     PAD (peripheral artery disease) 08/30/2017    Erectile dysfunction due to arterial insufficiency 10/09/2017    BPH with urinary obstruction 10/09/2017    Immunosuppressed status 10/16/2017    Low testosterone in male 07/25/2018    Hypertension associated with diabetes 12/01/2020    Hyperlipidemia associated with type 2 diabetes mellitus 12/01/2020    Erectile dysfunction associated with type 2 diabetes mellitus 12/01/2020    Claudication in peripheral vascular disease  12/01/2020    Aortic atherosclerosis 11/16/2010    Calcified granuloma of lung 10/22/2010    Carpal tunnel syndrome of left wrist 07/13/2021    Acute congestive heart failure 05/12/2023    Alcohol dependence, in remission 05/26/2023    Pulmonary emphysema, unspecified emphysema type 05/26/2023    Coronary artery disease involving native coronary artery of native heart with angina pectoris 08/24/2023    Epistaxis 12/04/2023    Complex regional pain syndrome type 2 of left upper extremity 07/12/2024    Cervical myelopathy 08/26/2024    Hepatic cirrhosis, unspecified hepatic cirrhosis type, unspecified whether ascites present 08/26/2024    Polyneuropathy associated with underlying disease 08/26/2024    S/P insertion of spinal cord stimulator 10/17/2024     Resolved Ambulatory Problems     Diagnosis Date Noted    Abdominal pain, generalized 08/25/2011    Abdominal pain, right upper quadrant 09/16/2010    Abdominal tenderness, right upper quadrant 05/15/2011    Unspecified chronic liver disease without mention of alcohol     Hypertension     Acute alcoholic hepatitis 08/02/2010    Acute bronchitis 03/14/2011    Acute gouty arthropathy 08/28/2011    Aftercare following organ transplant 06/17/2010    Alcoholic cirrhosis of liver 10/11/2010    Anemia of other chronic disease 07/15/2010    Cholangitis 07/15/2010    Chronic hepatitis C with hepatic coma 06/08/2011    Cirrhosis of liver without mention of alcohol 07/28/2011    Complications of transplanted liver 05/11/2011    Dietary surveillance and counseling 07/28/2011    Encephalopathy, unspecified 06/16/2010    Family history of diabetes mellitus 07/15/2010    Hematuria, unspecified 02/11/2012    Hepatomegaly 08/23/2011    Liver replaced by transplant 08/23/2011    Encounter for long-term (current) use of steroids 06/02/2011    Encounter for long-term (current) use of aspirin 01/16/2011    Microscopic hematuria 05/11/2011    Mononeuritis of unspecified site 07/15/2010     Need for prophylactic immunotherapy 06/02/2011    Nocturia 11/02/2010    Alcohol abuse, in remission 06/02/2010    Obstruction of bile duct 10/24/2010    Open wound of finger(s) , without mention of complication 07/12/2011    Other and unspecified alcohol dependence, unspecified drinking behavior 01/16/2011    Other ascites 06/16/2010    Other cells and casts in urine 11/02/2010    Other sequelae of chronic liver disease 07/28/2011    Other specified disorders of biliary tract 10/23/2010    Other specified disorders of liver 09/26/2010    Peptic ulcer, unspecified site, unspecified as acute or chronic, without mention of hemorrhage, perforation, or obstruction 10/20/2010    Personal history of other infectious and parasitic disease 11/15/2010    Portal hypertension 07/26/2010    Secondary diabetes mellitus without mention of complication, uncontrolled 10/24/2010    Unspecified disorder of male genital organs 11/02/2010    Genital herpes, unspecified 06/02/2011    Orchitis and epididymitis, unspecified 10/12/2010    Unspecified viral hepatitis C without hepatic coma 10/20/2010    Hepatitis C 12/10/2012    Genital herpes     Hyperpigmentation 10/21/2014    Diskitis 01/15/2015    Lower back pain 01/15/2015    Discitis of lumbosacral region 01/16/2015    Lumbar discitis 06/15/2015    Lumbar myelopathy 06/18/2015    Abnormal gait 07/09/2015    Muscle weakness 07/09/2015    LBP (low back pain) 07/09/2015    S/P lumbar spinal fusion 08/11/2015    Hyponatremia 10/04/2016    OSMANY (acute kidney injury) 10/04/2016    Volume depletion 02/28/2017    Ischemic leg 08/30/2017    Sacroiliitis 12/02/2019    Abdominal pain 01/23/2020    SBO (small bowel obstruction)     Sepsis 11/11/2020    Transaminitis 11/11/2020    Right lower quadrant abdominal pain 11/12/2020    Diarrhea 11/13/2020    Severe obesity (BMI 35.0-39.9) with comorbidity 12/01/2020    Diabetic polyneuropathy associated with type 2 diabetes mellitus 12/01/2020     Penetrating foot wound 10/11/2021    Diabetic foot infection 10/11/2021    Left hand weakness 02/01/2022    Fine motor impairment 02/14/2022    Loss of feeling or sensation 02/14/2022    Myeloradiculopathy 08/22/2022    Chronic neck pain with history of cervical spinal surgery 01/10/2023    Hypomagnesemia 05/13/2023    NSTEMI (non-ST elevated myocardial infarction) 08/02/2023     Past Medical History:   Diagnosis Date    Anemia     Cataract     Diabetes mellitus type II, uncontrolled     ED (erectile dysfunction)     Encounter for blood transfusion     Gout, arthritis     History of alcohol abuse     History of positive PPD, treatment status unknown     History of substance abuse     Hypothyroidism     Pancreatitis 2016    Peptic ulcer disease        PAST SURGICAL HISTORY:  Past Surgical History:   Procedure Laterality Date    ANTERIOR CERVICAL DISCECTOMY W/ FUSION N/A 8/22/2022    Procedure: Procedure: ACDF 4-7 LOS: 4.0 Anesthesia: General Blood: Type & Screen Radiology: C-Arm Microscope: ------- SNS: EMG, SEP, MEP Brace: [place] Bed: Regular Bed, Shoulder Strap Headrest:------ Position: Supine Equipment: Depuy;  Surgeon: Titi Christian MD;  Location: [facility] OR;  Service: Neurosurgery;  Laterality: N/A;  Depuy  confirmed CW 8/19  Neuro monitoring confirmed CW 8/19    CARPAL TUNNEL RELEASE Left 10/29/2021    Procedure: RELEASE, CARPAL TUNNEL;  Surgeon: Jameson Alejo Jr., MD;  Location: [facility] OR;  Service: Orthopedics;  Laterality: Left;    CHOLECYSTECTOMY      CORONARY ANGIOGRAPHY N/A 8/2/2023    Procedure: ANGIOGRAM, CORONARY ARTERY;  Surgeon: Juan Vera MD;  Location: [facility] CATH LAB/EP;  Service: Cardiology;  Laterality: N/A;    DECOMPRESSION OF CERVICAL SPINE BY ANTERIOR APPROACH WITH FUSION  8/22/2022    Procedure: DECOMPRESSION AND FUSION, SPINE, CERVICAL, ANTERIOR APPROACH;  Surgeon: Titi Christian MD;  Location: [facility] OR;  Service: Neurosurgery;;    INJECTION OF JOINT Right 12/2/2019    Procedure:  INJECTION, JOINT SI;  Surgeon: Thu Penn MD;  Location: [facility] PAIN MGT;  Service: Pain Management;  Laterality: Right;  RT SI JNT INJ    INSERTION OF SPINAL NEUROSTIMULATOR N/A 9/3/2024    Procedure: INSERTION, NEUROSTIMULATOR, SPINAL;  Surgeon: Khadar Payne MD;  Location: [facility] OR;  Service: Neurosurgery;  Laterality: N/A;  Procedure:C1-2 generator placement  Length of procedure:1.5 hours  LOS: 0-1 nights  Anesthesia:General  Blood:Type and screen  Radiology:C-arm  Bed:Regular Bed  Headrest:[place]  Position:Prone  Equipment:Giraldo-Doc Paulino    LAMINECTOMY, SPINE, FOR NEUROSTIMULATOR ELECTRODE INSERTION N/A 8/29/2024    Procedure: LAMINECTOMY, SPINE, FOR NEUROSTIMULATOR ELECTRODE INSERTION;  Surgeon: Khadar Payne MD;  Location: [facility] OR;  Service: Neurosurgery;  Laterality: N/A;  Procedure: C1-2 laminectomy for paddle lead placement for spinal cord stimulator trial, extension   Length of procedure: 2 hours  LOS: 0 nights  Anesthesia:General  Blood:Type and screen  Radiology:C-arm  SNS:EMG,SEP,MEP  Position:Pro    LEFT HEART CATHETERIZATION Left 8/2/2023    Procedure: Left heart cath;  Surgeon: Juan Vera MD;  Location: [facility] CATH LAB/EP;  Service: Cardiology;  Laterality: Left;    LIVER TRANSPLANT  06/2010    SPINE SURGERY      TRANSFORAMINAL EPIDURAL INJECTION OF STEROID Left 5/29/2023    Procedure: INJECTION, STEROID, EPIDURAL, TRANSFORAMINAL APPROACH,  LEFT C7-T1 DIRECT REF;  Surgeon: Thu Penn MD;  Location: [facility] PAIN MGT;  Service: Pain Management;  Laterality: Left;  4/3 MD ILL/PT WILL C/B       SOCIAL HISTORY:   Social History[1]    FAMILY HISTORY:  Family History   Problem Relation Name Age of Onset    Cancer Mother      Diabetes Mother      Heart disease Mother      Diabetes Sister      Cancer Maternal Uncle  82        colon CA    Drug abuse Daughter      Melanoma Neg Hx      Psoriasis Neg Hx      Lupus Neg Hx      Eczema Neg Hx      Acne Neg Hx         CURRENTS MEDICATIONS:  Medications  Ordered Prior to Encounter[2]    ALLERGIES:  Review of patient's allergies indicates:  No Known Allergies    REVIEW OF SYSTEMS:  Review of Systems   Constitutional:  Negative for diaphoresis, fever and weight loss.   Respiratory:  Negative for shortness of breath.    Cardiovascular:  Negative for chest pain.   Gastrointestinal:  Negative for blood in stool.   Genitourinary:  Negative for hematuria.   Endo/Heme/Allergies:  Does not bruise/bleed easily.   All other systems reviewed and are negative.        OBJECTIVE:    PHYSICAL EXAMINATION:   Vitals:    03/12/25 1418   BP: (!) 159/89   Pulse: 79       Physical Exam:  Vitals reviewed.    Constitutional: He appears well-developed and well-nourished.     Eyes: Pupils are equal, round, and reactive to light. Conjunctivae and EOM are normal.     Cardiovascular: Normal distal pulses and no edema.     Abdominal: Soft.     Skin: Skin displays no rash on trunk and no rash on extremities. Skin displays no lesions on trunk and no lesions on extremities.     Psych/Behavior: He is alert. He is oriented to person, place, and time. He has a normal mood and affect.     Musculoskeletal:        Neck: Range of motion is limited.       Ortho Exam      Neurological Exam  Mental Status  Alert. Oriented to person, place, and time.    Cranial Nerves  CN III, IV, VI: Extraocular movements intact bilaterally. Pupils equal round and reactive to light bilaterally.        DIAGNOSTIC DATA:  I personally interpreted the following imaging:   December cervical x-ray was showing good placement of the paddle lead    ASSESSMENT:  This is a 69 y.o. male with     Problem List Items Addressed This Visit          Neuro    Chronic pain syndrome     Other Visit Diagnoses         Spinal stenosis, lumbosacral region    -  Primary    Relevant Orders    MRI Lumbar Spine Without Contrast      Adjacent segment disease of lumbar spine with history of fusion procedure          Complex regional pain syndrome type 2  of left lower extremity          Spinal cord stimulator status                  PLAN:  Left superior trapezius and possible levator scapula trigger point injection with pain management  We will meet with [employer] representative today to receive his new spinal cord stimulator remote.  All questions answered.    More than 50% of the time was spent on discussing conservative management treatments (medication, physical therapy exercises) and possible interventions (spinal injections and surgical procedures). Care coordination was discussed.    30 min    The patient can not get an MRI due to 3 contacts on his paddle lead having high impedance    We will proceed with a CT myelogram of the cervical, thoracic and lumbar spine    Khadar Payne MD  Cell:[phone]           [1]   Social History  Socioeconomic History    Marital status:    Tobacco Use    Smoking status: Former     Passive exposure: Never    Smokeless tobacco: Never   Substance and Sexual Activity    Alcohol use: No     Comment: over 5 years ago, none currently    Drug use: Not Currently     Comment: Former cocaine use    Sexual activity: Yes     Social Drivers of Health     Financial Resource Strain: Low Risk  (8/30/2024)    Overall Financial Resource Strain (CARDIA)     Difficulty of Paying Living Expenses: Not hard at all   Food Insecurity: No Food Insecurity (8/30/2024)    Hunger Vital Sign     Worried About Running Out of Food in the Last Year: Never true     Ran Out of Food in the Last Year: Never true   Transportation Needs: No Transportation Needs (8/30/2024)    TRANSPORTATION NEEDS     Transportation : No   Physical Activity: Sufficiently Active (8/30/2024)    Exercise Vital Sign     Days of Exercise per Week: 7 days     Minutes of Exercise per Session: 30 min   Recent Concern: Physical Activity - Insufficiently Active (8/22/2024)    Exercise Vital Sign     Days of Exercise per Week: 2 days     Minutes of Exercise per Session: 20 min   Stress:  No Stress Concern Present (8/30/2024)    [employer] [place] [employer] - Occupational Stress Questionnaire     Feeling of Stress : Not at all   Recent Concern: Stress - Stress Concern Present (8/22/2024)    [employer] [place] [employer] - Occupational Stress Questionnaire     Feeling of Stress : To some extent   Housing Stability: Unknown (8/30/2024)    Housing Stability Vital Sign     Unable to Pay for Housing in the Last Year: No     Homeless in the Last Year: No   [2]   Current Outpatient Medications on File Prior to Visit   Medication Sig Dispense Refill    allopurinoL (ZYLOPRIM) 100 MG tablet TAKE 1 TABLET BY MOUTH TWICE A  tablet 3    aluminum-magnesium hydroxide-simethicone (MAALOX) 200-200-20 mg/5 mL Susp Take 30 mLs by mouth 4 (four) times daily before meals and nightly. 769 mL 0    aspirin (ECOTRIN) 81 MG EC tablet Take 1 tablet (81 mg total) by mouth once daily.      blood-glucose meter,continuous (DEXCOM G7 ) Misc Use as directed. 1 each 11    blood-glucose sensor (DEXCOM G7 SENSOR) Viviane Use as directed. 3 each 11    blood-glucose transmitter (DEXCOM G6 TRANSMITTER) Viviane Use as directed 1 each 11    calcium carbonate-vitamin D3 (CALCIUM 600 WITH VITAMIN D3) 600 mg(1,500mg) -400 unit Chew Take 1 tablet by mouth once daily.       cyanocobalamin (VITAMIN B-12) 100 MCG tablet Take 100 mcg by mouth once daily.      diphenhydrAMINE (BENADRYL) 25 mg capsule Take 25 mg by mouth daily as needed for Allergies (Cold).      gabapentin (NEURONTIN) 800 MG tablet TAKE 1 TABLET BY MOUTH THREE TIMES A DAY 90 tablet 11    insulin glargine U-300 conc (TOUJEO MAX U-300 SOLOSTAR) 300 unit/mL (3 mL) insulin pen Inject 40 Units into the skin once daily. 3 mL 11    insulin lispro (HUMALOG KWIKPEN INSULIN) 100 unit/mL pen Inject 20 Units into the skin 3 (three) times daily with meals. 60 mL 11    lancets 30 gauge Misc 1 lancet by Misc.(Non-Drug; Combo Route) route 4 (four) times daily before  "meals and nightly. 200 each 11    levothyroxine (SYNTHROID) 88 MCG tablet TAKE 1 TABLET BY MOUTH EVERY DAY 90 tablet 3    lisinopriL (PRINIVIL,ZESTRIL) 20 MG tablet TAKE 1 TABLET BY MOUTH EVERY DAY 90 tablet 3    methocarbamoL (ROBAXIN) 750 MG Tab Take 1 tablet (750 mg total) by mouth 3 (three) times daily as needed (muscle spasms). 60 tablet 0    methylPREDNISolone (MEDROL DOSEPACK) 4 mg tablet Take according to instructions on the package 21 each 0    metoprolol succinate (TOPROL-XL) 200 MG 24 hr tablet Take 1 tablet (200 mg total) by mouth once daily. 90 tablet 3    multivit with min-folic acid (MEN'S MULTIVITAMIN GUMMIES) 200 mcg Chew Take 1 tablet by mouth once daily.      NIFEdipine (PROCARDIA-XL) 30 MG (OSM) 24 hr tablet TAKE 1 TABLET BY MOUTH EVERY DAY 90 tablet 3    NOVOFINE PLUS 32 gauge x 1/6" Ndle       oxyCODONE-acetaminophen (PERCOCET)  mg per tablet Take 1 tablet by mouth every 8 (eight) hours as needed for Pain. Quantity greater than 7 day supply medically necessary 90 tablet 0    papaverine 30 mg/mL injection Tri-Mix - PGE (alprostadil) 10mcg, papavarine 30mg, phentolamine 1mg; dispense 5mL vial, typical starting is 0.2 mL which is equal to 20 units (Patient taking differently: as needed. Tri-Mix - PGE (alprostadil) 10mcg, papavarine 30mg, phentolamine 1mg; dispense 5mL vial, typical starting is 0.2 mL which is equal to 20 units) 10 mL 5    sildenafiL (VIAGRA) 100 MG tablet Take 1 tablet (100 mg total) by mouth daily as needed for Erectile Dysfunction. 30 tablet 11    sildenafiL (VIAGRA) 50 MG tablet Take 1 tablet (50 mg total) by mouth daily as needed for Erectile Dysfunction. 30 tablet 0    tacrolimus (PROGRAF) 1 MG Cap Take 2 capsules (2 mg total) by mouth every morning AND 1 capsule (1 mg total) every evening. 90 capsule 11    tirzepatide (MOUNJARO) 2.5 mg/0.5 mL PnIj Inject 2.5 mg into the skin every 7 days. 4 Pen 11    traZODone (DESYREL) 50 MG tablet TAKE 1 TABLET BY MOUTH EVERY DAY IN " THE EVENING 90 tablet 3    TRUE METRIX GLUCOSE TEST STRIP Strp USE 3 TIMES DAILY TO TEST BLOOD GLUCOSE LEVEL 100 strip 11    atorvastatin (LIPITOR) 40 MG tablet Take 1 tablet (40 mg total) by mouth once daily. 90 tablet 3    blood-glucose meter Misc 1 Device by Misc.(Non-Drug; Combo Route) route 3 (three) times daily. 1 each 0    furosemide (LASIX) 20 MG tablet Take 2 tablets (40 mg total) by mouth daily as needed (For Weight Gain > 2-3 lbs in 1 day or 4-6 lbs over 1 week notify PCP and take 40 mg daily for three days). (Patient not taking: Reported on 4/1/2024) 60 tablet 0    [DISCONTINUED] insulin aspart U-100 (NOVOLOG FLEXPEN U-100 INSULIN) 100 unit/mL (3 mL) InPn pen Inject 20 Units into the skin 3 (three) times daily with meals. 15 mL 11     No current facility-administered medications on file prior to visit.

## 2025-03-12 NOTE — PROCEDURES
Trigger Point Injection    Performed by: Bony Mccullough FNP  Authorized by: Bony Mccullough FNP    Cervical Paraspinal:  Left  Trapezus:  Left    Consent Done?:  Yes (Written)    Pre-Procedure:   Indications:  Pain and pain relief   Timeout: prior to procedure the correct patient, procedure, and site was verified      Local anesthesia used?: Yes    Local anesthetic:  Bupivacaine 0.25% without epinephrine  Medications: 40 mg triamcinolone acetonide 40 mg/mL

## 2025-03-14 DIAGNOSIS — G89.4 CHRONIC PAIN SYNDROME: ICD-10-CM

## 2025-03-14 DIAGNOSIS — M96.1 POSTLAMINECTOMY SYNDROME OF LUMBAR REGION: ICD-10-CM

## 2025-03-14 DIAGNOSIS — M43.10 ACQUIRED SPONDYLOLISTHESIS: ICD-10-CM

## 2025-03-14 DIAGNOSIS — F11.90 CHRONIC, CONTINUOUS USE OF OPIOIDS: ICD-10-CM

## 2025-03-14 DIAGNOSIS — M54.16 LUMBAR RADICULOPATHY: ICD-10-CM

## 2025-03-14 RX ORDER — GABAPENTIN 800 MG/1
800 TABLET ORAL 3 TIMES DAILY
Qty: 90 TABLET | Refills: 11 | Status: SHIPPED | OUTPATIENT
Start: 2025-03-14

## 2025-03-14 NOTE — TELEPHONE ENCOUNTER
No care due was identified.  Health [facility] Embedded Care Due Messages. Reference number: [id number].   3/14/2025 10:00:40 AM CDT

## 2025-03-17 ENCOUNTER — OFFICE VISIT (OUTPATIENT)
Dept: FAMILY MEDICINE | Facility: CLINIC | Age: 70
End: 2025-03-17
Payer: MEDICARE

## 2025-03-17 VITALS
TEMPERATURE: 98 F | BODY MASS INDEX: 31.49 KG/M2 | DIASTOLIC BLOOD PRESSURE: 80 MMHG | HEIGHT: 74 IN | OXYGEN SATURATION: 95 % | WEIGHT: 245.38 LBS | HEART RATE: 90 BPM | SYSTOLIC BLOOD PRESSURE: 120 MMHG

## 2025-03-17 DIAGNOSIS — Z23 NEED FOR VACCINATION AGAINST STREPTOCOCCUS PNEUMONIAE: ICD-10-CM

## 2025-03-17 DIAGNOSIS — Z79.4 TYPE 2 DIABETES MELLITUS WITH DIABETIC POLYNEUROPATHY, WITH LONG-TERM CURRENT USE OF INSULIN: ICD-10-CM

## 2025-03-17 DIAGNOSIS — Z79.4 DIABETES MELLITUS TYPE 2, INSULIN DEPENDENT: Primary | ICD-10-CM

## 2025-03-17 DIAGNOSIS — E11.42 TYPE 2 DIABETES MELLITUS WITH DIABETIC POLYNEUROPATHY, WITH LONG-TERM CURRENT USE OF INSULIN: ICD-10-CM

## 2025-03-17 DIAGNOSIS — E11.9 DIABETES MELLITUS TYPE 2, INSULIN DEPENDENT: Primary | ICD-10-CM

## 2025-03-17 DIAGNOSIS — D72.829 LEUKOCYTOSIS, UNSPECIFIED TYPE: ICD-10-CM

## 2025-03-17 PROCEDURE — 99999 PR PBB SHADOW E&M-EST. PATIENT-LVL V: CPT | Mod: PBBFAC,,, | Performed by: FAMILY MEDICINE

## 2025-03-17 RX ORDER — POLYETHYLENE GLYCOL 3350 17 G/17G
17 POWDER, FOR SOLUTION ORAL DAILY
COMMUNITY
Start: 2025-03-17

## 2025-03-17 RX ORDER — BLOOD-GLUCOSE SENSOR
EACH MISCELLANEOUS
Qty: 3 EACH | Refills: 11 | Status: SHIPPED | OUTPATIENT
Start: 2025-03-17

## 2025-03-17 RX ORDER — TIRZEPATIDE 2.5 MG/.5ML
2.5 INJECTION, SOLUTION SUBCUTANEOUS
Qty: 4 PEN | Refills: 11 | Status: SHIPPED | OUTPATIENT
Start: 2025-03-17

## 2025-03-17 NOTE — PROGRESS NOTES
(Portions of this note were dictated using voice recognition software and may contain dictation related errors in spelling/grammar/syntax not found on text review)    CC:   Chief Complaint   Patient presents with    Follow-up       HPI: 69 y.o. male   Last visit 02/24/2025. Multiple adherence and accuracy concerns on his med list address last time, brought meds in today, comments in bold below.     Neuro surgery following for cervical disc disease, Had cervical spine fusion surgery August 2022.  Followed up with neuro surgery and was having some worsening left upper extremity pain.  EMG showed chronic left C7 radiculopathy and left ulnar neuropathy across the elbow.  Last neuro surgery visit was 12/09/2024  Was planning for cervical spine surgery given radiculopathic/myelopathic symptoms September 25th although in the meantime was hospitalized for NSTEMI  Surgery was canceled secondary to this..  Ended up having spinal cord stimulator in August, 2024.  Currently on oxycodone through neuro surgery.  Currently on gabapentin 800 mg t.i.d. went to ER with worsening low back pain, was given methocarbamol, morphine, discharged with Ultracet.  Has a appointment with neuro surgery on March 12th      CHF prompting hospitalization in May 2023  Started on isosorbide hydralazine 20/37.5 t.i.d. (currently not on)  Prescribed furosemide 40 mg p.r.n. for weight gain greater than 2-3 lb in 1 day or 4-6 lb over 1 week, has not needed  Was on lisinopril but this was changed in favor of Entresto 49/51 b.i.d. after cardiology visit 07/24/2023 (however it looks like he is not taking Entresto nor lisinopril.  BP elevated on intake 150/92 but he states that he did not take his medications although on recheck BP is 120/80.  States that he checks it periodically at home and it is usually normal but he does not write down his numbers.    Current meds:     metoprolol 200 mg daily   Nifedipine 30 mg daily    Echo 08/03/2023: EF 55-60%, grade  1 diastolic dysfunction      NSTEMI hospitalized 08/02/2023, discharged 08/03/2023.  UDS was positive for cocaine and opiates.  Was given nitroglycerin and heparin infusions, seen in consultation with Cardiology, left heart catheterization showed nonobstructive coronary artery disease, no stents placed.  Advise medical management with aspirin/Plavix for 1 year and high-intensity statin therapy    Has not seen Cardiology since 2023     Dizziness/disequilibrium, saw ENT.  CT head 11/01/2024 showed normal inner ear structures, trace left mastoid air cell fluid, chronic ischemic intracranial changes progression CT of 2022, new pontine infarct noted.  Patchy paranasal sinus thickening.  Was referred for vestibular therapy, looks like he no showed appointment based on chart review.       Diabetes with peripheral neuropathy:  Complicated by  dietary noncompliance , was followed by endocrinology (last seen January 2022)   His current regimen of diabetes was adjusted to glargine (toujeo) 40 units (prior states he is only taking some of the time but states now he is taking daily.  Humalog to up to 20 units t.i.d. with meals daily.  Was believed that a lot of his hyperglycemia issues prior were relating to dietary and insulin noncompliance.   .  Was considering G LP 1 agonist therapy but there was some concern given prior history of pancreatitis, although pancreatitis prior was relating to alcohol use.  Given not drinking anymore and no recurrent issue with pancreatitis, we discussed starting on Mounjaro 2.5 mg weekly to make sure he can tolerate, states that he only take some of the time.  Most recent A1c in March 2025 was 6.9     Dyslipidemia was on Crestor at my last visit with him but currently med list now stays Lipitor 40 mg daily.  Unclear whether he is taking this or not (no recent fills noted in chart)     Hypothyroidism:  Synthroid 88mcg.       Anxiety was at some point on Zoloft 100 mg daily although medication  looks to be discontinued in his medical record. , looks like he is still taking trazodone 50 mg q.h.s.     Liver Transplant secondary to end-stage liver disease hepatitis C and prior alcohol abuse.:  Followed by hepatology.  On Prograf.   fibroscan showed stage II fibrosis         Past Medical History:   Diagnosis Date    Acute congestive heart failure 5/12/2023    Anemia     Anxiety     Cataract     Chronic pain syndrome 07/13/2011    CKD (chronic kidney disease), stage III     Coronary artery disease involving native coronary artery of native heart with angina pectoris 8/24/2023    Diabetes mellitus type II, uncontrolled     Discitis of lumbosacral region 01/16/2015    ED (erectile dysfunction)     Encounter for blood transfusion     Genital herpes     Gout, arthritis     History of alcohol abuse     History of hepatitis C, s/p successful Rx w/ SVR24 (cure) - 5/2018 08/23/2011    Completed 24wks Epclusa + RBV w/SVR12 - 2/2018  -     History of positive PPD, treatment status unknown     Pulmonary granulomas, negative sputum cultures for AFB and indeterminate quantferon test    History of substance abuse     Hypertension     Hypothyroidism     Liver replaced by transplant 08/23/2011    DATE: 12/16/2013  LIVER BIOPSY : REASON:  hep C staging  PATHOLOGY COMMITTEE NOTE/PLAN: :grade  1 / stage 1        Pancreatitis 2016    Peptic ulcer disease     Pulmonary emphysema, unspecified emphysema type 5/26/2023       Past Surgical History:   Procedure Laterality Date    ANTERIOR CERVICAL DISCECTOMY W/ FUSION N/A 8/22/2022    Procedure: Procedure: ACDF 4-7 LOS: 4.0 Anesthesia: General Blood: Type & Screen Radiology: C-Arm Microscope: ------- SNS: EMG, SEP, MEP Brace: [place] Bed: Regular Bed, Shoulder Strap Headrest:------ Position: Supine Equipment: Depuy;  Surgeon: Titi Christian MD;  Location: [facility];  Service: Neurosurgery;  Laterality: N/A;  Depuy  confirmed CW 8/19  Neuro monitoring confirmed CW 8/19    CARPAL TUNNEL  RELEASE Left 10/29/2021    Procedure: RELEASE, CARPAL TUNNEL;  Surgeon: Jameson Alejo Jr., MD;  Location: [facility] OR;  Service: Orthopedics;  Laterality: Left;    CHOLECYSTECTOMY      CORONARY ANGIOGRAPHY N/A 8/2/2023    Procedure: ANGIOGRAM, CORONARY ARTERY;  Surgeon: Juan Vera MD;  Location: [facility] CATH LAB/EP;  Service: Cardiology;  Laterality: N/A;    DECOMPRESSION OF CERVICAL SPINE BY ANTERIOR APPROACH WITH FUSION  8/22/2022    Procedure: DECOMPRESSION AND FUSION, SPINE, CERVICAL, ANTERIOR APPROACH;  Surgeon: Titi Christian MD;  Location: [facility] OR;  Service: Neurosurgery;;    INJECTION OF JOINT Right 12/2/2019    Procedure: INJECTION, JOINT SI;  Surgeon: Thu Penn MD;  Location[facility];  Service: Pain Management;  Laterality: Right;  RT SI JNT INJ    INSERTION OF SPINAL NEUROSTIMULATOR N/A 9/3/2024    Procedure: INSERTION, NEUROSTIMULATOR, SPINAL;  Surgeon: Khadar Payne MD;  Location: [facility] OR;  Service: Neurosurgery;  Laterality: N/A;  Procedure:C1-2 generator placement  Length of procedure:1.5 hours  LOS: 0-1 nights  Anesthesia:General  Blood:Type and screen  Radiology:C-arm  Bed:Regular Bed  Headrest:[place]  Position:Prone  Equipment:Kassandra Bob    LAMINECTOMY, SPINE, FOR NEUROSTIMULATOR ELECTRODE INSERTION N/A 8/29/2024    Procedure: LAMINECTOMY, SPINE, FOR NEUROSTIMULATOR ELECTRODE INSERTION;  Surgeon: Khadar Payne MD;  Location: [facility] OR;  Service: Neurosurgery;  Laterality: N/A;  Procedure: C1-2 laminectomy for paddle lead placement for spinal cord stimulator trial, extension   Length of procedure: 2 hours  LOS: 0 nights  Anesthesia:General  Blood:Type and screen  Radiology:C-arm  SNS:EMG,SEP,MEP  Position:Pro    LEFT HEART CATHETERIZATION Left 8/2/2023    Procedure: Left heart cath;  Surgeon: Juan Vera MD;  Location: [facility] CATH LAB/EP;  Service: Cardiology;  Laterality: Left;    LIVER TRANSPLANT  06/2010    SPINE SURGERY      TRANSFORAMINAL EPIDURAL INJECTION OF  STEROID Left 5/29/2023    Procedure: INJECTION, STEROID, EPIDURAL, TRANSFORAMINAL APPROACH,  LEFT C7-T1 DIRECT REF;  Surgeon: Thu Penn MD;  Location: [facility] PAIN MGT;  Service: Pain Management;  Laterality: Left;  4/3 MD ILL/PT WILL C/B       Family History   Problem Relation Name Age of Onset    Cancer Mother      Diabetes Mother      Heart disease Mother      Diabetes Sister      Cancer Maternal Uncle  82        colon CA    Drug abuse Daughter      Melanoma Neg Hx      Psoriasis Neg Hx      Lupus Neg Hx      Eczema Neg Hx      Acne Neg Hx         Social History     Tobacco Use    Smoking status: Former     Passive exposure: Never    Smokeless tobacco: Never   Substance Use Topics    Alcohol use: No     Comment: over 5 years ago, none currently    Drug use: Not Currently     Comment: Former cocaine use       Lab Results   Component Value Date    WBC 14.56 (H) 03/12/2025    HGB 13.6 (L) 03/12/2025    HCT 42.1 03/12/2025    MCV 90 03/12/2025     03/12/2025    CHOL 174 03/12/2025    TRIG 89 03/12/2025    HDL 50 03/12/2025    ALT 22 03/12/2025    AST 18 03/12/2025    BILITOT 0.3 03/12/2025    ALKPHOS 71 03/12/2025     03/12/2025    K 4.1 03/12/2025     03/12/2025    CREATININE 1.2 03/12/2025    ESTGFRAFRICA >60.0 07/25/2022    EGFRNONAA 52.7 (A) 07/25/2022    EGFRNORACEVR >60 03/12/2025    CALCIUM 9.2 03/12/2025    ALBUMIN 3.9 03/12/2025    BUN 27 (H) 03/12/2025    CO2 21 (L) 03/12/2025    TSH 1.757 03/12/2025    PSA 0.20 03/12/2025    PSADIAG 0.28 10/09/2017    INR 1.1 07/30/2024    HGBA1C 6.9 (H) 03/12/2025    MICALBCREAT 135.6 (H) 03/12/2025    LDLCALC 106.2 03/12/2025     (H) 03/12/2025    VBGMBFER74MR 30 10/11/2021         Hemoglobin (g/dL)   Date Value   03/12/2025 13.6 (L)   03/07/2025 12.9 (L)   02/24/2025 13.7 (L)   11/15/2024 12.8 (L)   07/30/2024 12.5 (L)   07/01/2024 12.9 (L)   06/03/2024 12.7 (L)   03/04/2024 12.3 (L)   12/04/2023 12.0 (L)   11/27/2023 12.7 (L)     Platelets  "(K/uL)   Date Value   03/12/2025 189   03/07/2025 181   02/24/2025 SEE COMMENT   11/15/2024 165   07/30/2024 169   07/01/2024 156   06/03/2024 129 (L)   03/04/2024 156   12/04/2023 129 (L)   11/27/2023 143 (L)     Hemoglobin A1C (%)   Date Value   03/12/2025 6.9 (H)   07/30/2024 6.3 (H)   12/04/2023 8.5 (H)   05/13/2023 6.8 (H)   12/05/2022 7.8 (H)   08/16/2022 9.7 (H)   01/25/2022 7.4 (H)   01/24/2022 7.4 (H)   10/11/2021 10.1 (H)   11/11/2020 9.1 (H)     eGFR (mL/min/1.73 m^2)   Date Value   03/12/2025 >60   03/07/2025 50.7 (A)   02/24/2025 >60.0   11/15/2024 >60.0   07/30/2024 >60   07/01/2024 >60.0   06/03/2024 >60.0   03/04/2024 >60.0   12/04/2023 >60.0   11/27/2023 >60.0             Vital signs reviewed  Vitals:    03/17/25 1311 03/17/25 1423   BP: (!) 150/92 120/80   BP Location: Left arm    Patient Position: Sitting    Pulse: 90    Temp: 97.7 °F (36.5 °C)    TempSrc: Oral    SpO2: 95%    Weight: 111.3 kg (245 lb 6 oz)    Height: 6' 2" (1.88 m)              Wt Readings from Last 4 Encounters:   03/17/25 111.3 kg (245 lb 6 oz)   03/12/25 116.6 kg (257 lb 0.9 oz)   03/12/25 108.9 kg (240 lb 1.3 oz)   03/07/25 108.9 kg (240 lb)       PE:   APPEARANCE: Well nourished, well developed, in no acute distress.        IMPRESSION  1. Diabetes mellitus type 2, insulin dependent    2. Type 2 diabetes mellitus with diabetic polyneuropathy, with long-term current use of insulin    3. Need for vaccination against Streptococcus pneumoniae    4. Leukocytosis, unspecified type                  PLAN  Orders Placed This Encounter   Procedures    CBC Auto Differential    Lipid Panel    Hemoglobin A1C    TSH    Ambulatory referral/consult to Optometry     Medications Ordered This Encounter   Medications    blood-glucose sensor (DEXCOM G7 SENSOR) Viviane     Sig: Use as directed.     Dispense:  3 each     Refill:  11    pneumoc 20-hermann conj-dip cr(PF) (PREVNAR-20 (PF)) injection Syrg 0.5 mL    polyethylene glycol (MIRALAX) 17 gram PwPk "     Sig: Take 17 g by mouth once daily.    tirzepatide (MOUNJARO) 2.5 mg/0.5 mL PnIj     Sig: Inject 2.5 mg into the skin every 7 days.     Dispense:  4 Pen     Refill:  11           Hypertension, controlled on recheck..  On metoprolol and nifedipine, 1 point was on ACE inhibitor and this was discontinued in favor of Entresto but now he states that he is not taking Entresto in his not sure why.  Last echo showed EF 55-60%.  For now will continue current medication therapy     Non STEMI/history of CHF.  No current symptoms.  Has not seen Cardiology in over a year.  If any particular concerns will get back in with Cardiology especially if needing to get back on further CHF specific medications.  Last echo showed preserved ejection fraction grade 1 diastolic dysfunction.    Diabetes:  Advise getting back on Mounjaro regularly weekly 2.5 mg.  Continue his Toujeo and Humalog as above.  Update labs in 3 months    Leukocytosis although he states that he recently had some steroid injections done prior to the getting the lab when he went to ER for back pain.  Will update labs in three-month with visit after     Optometry referral for eye check    No longer drinking alcohol     Continue his current transplant immunotherapy      Chronic back pain, follow up with Neurosurgery as directed      Asks about medication for constipation, can take OTC MiraLax        Three month labs with visit to follow.  Bring medications at next visit.  Bring home blood pressure readings and blood pressure cuff to next visit     SCREENINGS   Colonoscopy 2015, return in 10 years   PSA 2025: Stable     Immunizations   Pneumovax 2019  PCV 10/29/2018   PCV20 ordered but out of stock, can pursue at a follow up visit or nurse's visit near future.  COVID [facility]  Hepatitis B series up-to-date   Tdap 2021   Can get zoster vaccine at pharmacy   Flu utd

## 2025-03-17 NOTE — PATIENT INSTRUCTIONS
Blood pressure: write down numbers daily; bring list to visit in 3 months.    Goal blood pressure less than 140/90    Diabetes> continue your daily toujeo and your humalog with meals. Take the weekly mounjaro shot consistenly. We'll check labs again in 3 months and revisit after.     Look into getting the Shingles vaccine (SHINGRIX) at your local pharmacy (2 part series, done once at/after age 50)      Labs 3 months with visit after  Orders Placed This Encounter   Procedures    CBC Auto Differential    Lipid Panel    Hemoglobin A1C    TSH    Ambulatory referral/consult to Optometry     Medications Ordered This Encounter   Medications    blood-glucose sensor (DEXCOM G7 SENSOR) Viviane     Sig: Use as directed.     Dispense:  3 each     Refill:  11    pneumoc 20-hermann conj-dip cr(PF) (PREVNAR-20 (PF)) injection Syrg 0.5 mL    tirzepatide (MOUNJARO) 2.5 mg/0.5 mL PnIj     Sig: Inject 2.5 mg into the skin every 7 days.     Dispense:  4 Pen     Refill:  11

## 2025-03-20 DIAGNOSIS — G89.4 CHRONIC PAIN SYNDROME: ICD-10-CM

## 2025-03-20 RX ORDER — OXYCODONE AND ACETAMINOPHEN 10; 325 MG/1; MG/1
1 TABLET ORAL EVERY 8 HOURS PRN
Qty: 90 TABLET | Refills: 0 | Status: SHIPPED | OUTPATIENT
Start: 2025-03-20 | End: 2025-04-19

## 2025-03-24 ENCOUNTER — TELEPHONE (OUTPATIENT)
Dept: NEUROSURGERY | Facility: CLINIC | Age: 70
End: 2025-03-24
Payer: MEDICARE

## 2025-03-24 DIAGNOSIS — G89.4 CHRONIC PAIN SYNDROME: Primary | ICD-10-CM

## 2025-03-24 NOTE — TELEPHONE ENCOUNTER
Returned pt's call, pt stated that he is ready to get scheduled for his other imaging. I stated to pt that I will contact the radiology department to get him scheduled. Pt voiced understanding

## 2025-03-31 ENCOUNTER — LAB VISIT (OUTPATIENT)
Dept: LAB | Facility: HOSPITAL | Age: 70
End: 2025-03-31
Attending: INTERNAL MEDICINE
Payer: MEDICARE

## 2025-03-31 ENCOUNTER — TELEPHONE (OUTPATIENT)
Dept: NEUROSURGERY | Facility: CLINIC | Age: 70
End: 2025-03-31
Payer: MEDICARE

## 2025-03-31 DIAGNOSIS — Z94.4 S/P LIVER TRANSPLANT: ICD-10-CM

## 2025-03-31 LAB
ABSOLUTE EOSINOPHIL (OHS): 0.33 K/UL
ABSOLUTE MONOCYTE (OHS): 0.69 K/UL (ref 0.3–1)
ABSOLUTE NEUTROPHIL COUNT (OHS): 4.97 K/UL (ref 1.8–7.7)
ALBUMIN SERPL BCP-MCNC: 3.3 G/DL (ref 3.5–5.2)
ALP SERPL-CCNC: 78 UNIT/L (ref 40–150)
ALT SERPL W/O P-5'-P-CCNC: 22 UNIT/L (ref 10–44)
ANION GAP (OHS): 11 MMOL/L (ref 8–16)
AST SERPL-CCNC: 17 UNIT/L (ref 11–45)
BASOPHILS # BLD AUTO: 0.04 K/UL
BASOPHILS NFR BLD AUTO: 0.4 %
BILIRUB SERPL-MCNC: 0.3 MG/DL (ref 0.1–1)
BUN SERPL-MCNC: 16 MG/DL (ref 8–23)
CALCIUM SERPL-MCNC: 9.5 MG/DL (ref 8.7–10.5)
CHLORIDE SERPL-SCNC: 103 MMOL/L (ref 95–110)
CO2 SERPL-SCNC: 24 MMOL/L (ref 23–29)
CREAT SERPL-MCNC: 1.2 MG/DL (ref 0.5–1.4)
ERYTHROCYTE [DISTWIDTH] IN BLOOD BY AUTOMATED COUNT: 13.2 % (ref 11.5–14.5)
GFR SERPLBLD CREATININE-BSD FMLA CKD-EPI: >60 ML/MIN/1.73/M2
GLUCOSE SERPL-MCNC: 198 MG/DL (ref 70–110)
HCT VFR BLD AUTO: 39.8 % (ref 40–54)
HGB BLD-MCNC: 12.3 GM/DL (ref 14–18)
IMM GRANULOCYTES # BLD AUTO: 0.03 K/UL (ref 0–0.04)
IMM GRANULOCYTES NFR BLD AUTO: 0.3 % (ref 0–0.5)
LYMPHOCYTES # BLD AUTO: 2.83 K/UL (ref 1–4.8)
MCH RBC QN AUTO: 28.7 PG (ref 27–31)
MCHC RBC AUTO-ENTMCNC: 30.9 G/DL (ref 32–36)
MCV RBC AUTO: 93 FL (ref 82–98)
NUCLEATED RBC (/100WBC) (OHS): 0 /100 WBC
PLATELET # BLD AUTO: 138 K/UL (ref 150–450)
PMV BLD AUTO: 12.4 FL (ref 9.2–12.9)
POTASSIUM SERPL-SCNC: 3.9 MMOL/L (ref 3.5–5.1)
PROT SERPL-MCNC: 7.2 GM/DL (ref 6–8.4)
RBC # BLD AUTO: 4.28 M/UL (ref 4.6–6.2)
RELATIVE EOSINOPHIL (OHS): 3.7 %
RELATIVE LYMPHOCYTE (OHS): 31.8 % (ref 18–48)
RELATIVE MONOCYTE (OHS): 7.8 % (ref 4–15)
RELATIVE NEUTROPHIL (OHS): 56 % (ref 38–73)
SODIUM SERPL-SCNC: 138 MMOL/L (ref 136–145)
WBC # BLD AUTO: 8.89 K/UL (ref 3.9–12.7)

## 2025-03-31 PROCEDURE — 80197 ASSAY OF TACROLIMUS: CPT

## 2025-03-31 PROCEDURE — 82247 BILIRUBIN TOTAL: CPT

## 2025-03-31 PROCEDURE — 36415 COLL VENOUS BLD VENIPUNCTURE: CPT | Mod: PO

## 2025-03-31 PROCEDURE — 85025 COMPLETE CBC W/AUTO DIFF WBC: CPT

## 2025-03-31 NOTE — TELEPHONE ENCOUNTER
Returned pt's call, pt stated that he is still waiting on someone to call him to schedule the imaging for his back. I stated to pt that someone tried to call last week to get him scheduled. I also sated that I will get them to call back to get him scheduled. Pt voiced understanding

## 2025-04-01 ENCOUNTER — TELEPHONE (OUTPATIENT)
Dept: TRANSPLANT | Facility: CLINIC | Age: 70
End: 2025-04-01
Payer: MEDICARE

## 2025-04-01 DIAGNOSIS — Z94.4 S/P LIVER TRANSPLANT: Primary | ICD-10-CM

## 2025-04-01 DIAGNOSIS — K74.60 HEPATIC CIRRHOSIS, UNSPECIFIED HEPATIC CIRRHOSIS TYPE, UNSPECIFIED WHETHER ASCITES PRESENT: ICD-10-CM

## 2025-04-01 LAB — TACROLIMUS BLD-MCNC: 2.4 NG/ML (ref 5–15)

## 2025-04-14 DIAGNOSIS — G89.4 CHRONIC PAIN SYNDROME: ICD-10-CM

## 2025-04-14 RX ORDER — OXYCODONE AND ACETAMINOPHEN 10; 325 MG/1; MG/1
1 TABLET ORAL EVERY 8 HOURS PRN
Qty: 90 TABLET | Refills: 0 | Status: SHIPPED | OUTPATIENT
Start: 2025-04-14 | End: 2025-05-14

## 2025-04-15 ENCOUNTER — TELEPHONE (OUTPATIENT)
Dept: NEUROSURGERY | Facility: CLINIC | Age: 70
End: 2025-04-15
Payer: MEDICARE

## 2025-04-15 NOTE — TELEPHONE ENCOUNTER
Returned pt's call, pt stated that he is going get the CT myelogram on Thursday. I stated to pt that we will call him with the results. Pt voiced understanding

## 2025-04-23 ENCOUNTER — HOSPITAL ENCOUNTER (OUTPATIENT)
Dept: RADIOLOGY | Facility: HOSPITAL | Age: 70
Discharge: HOME | End: 2025-04-23
Attending: NEUROLOGICAL SURGERY
Payer: MEDICARE

## 2025-04-23 ENCOUNTER — TELEPHONE (OUTPATIENT)
Dept: NEUROSURGERY | Facility: CLINIC | Age: 70
End: 2025-04-23
Payer: MEDICARE

## 2025-04-23 VITALS
RESPIRATION RATE: 20 BRPM | WEIGHT: 240 LBS | HEIGHT: 74 IN | BODY MASS INDEX: 30.8 KG/M2 | DIASTOLIC BLOOD PRESSURE: 94 MMHG | SYSTOLIC BLOOD PRESSURE: 168 MMHG | OXYGEN SATURATION: 99 % | HEART RATE: 88 BPM | TEMPERATURE: 98 F

## 2025-04-23 DIAGNOSIS — G89.4 CHRONIC PAIN SYNDROME: ICD-10-CM

## 2025-04-23 LAB — POCT GLUCOSE: 160 MG/DL (ref 70–110)

## 2025-04-23 PROCEDURE — 72132 CT LUMBAR SPINE W/DYE: CPT | Mod: 26,,, | Performed by: RADIOLOGY

## 2025-04-23 PROCEDURE — 72129 CT CHEST SPINE W/DYE: CPT | Mod: TC

## 2025-04-23 PROCEDURE — 72126 CT NECK SPINE W/DYE: CPT | Mod: 26,,, | Performed by: RADIOLOGY

## 2025-04-23 PROCEDURE — 25500020 PHARM REV CODE 255: Performed by: NEUROLOGICAL SURGERY

## 2025-04-23 PROCEDURE — 25000003 PHARM REV CODE 250

## 2025-04-23 PROCEDURE — 63600175 PHARM REV CODE 636 W HCPCS: Performed by: NEUROLOGICAL SURGERY

## 2025-04-23 PROCEDURE — 72129 CT CHEST SPINE W/DYE: CPT | Mod: 26,,, | Performed by: RADIOLOGY

## 2025-04-23 RX ORDER — OXYCODONE AND ACETAMINOPHEN 5; 325 MG/1; MG/1
TABLET ORAL
Status: COMPLETED
Start: 2025-04-23 | End: 2025-04-23

## 2025-04-23 RX ORDER — LIDOCAINE HYDROCHLORIDE 10 MG/ML
5 INJECTION, SOLUTION INFILTRATION; PERINEURAL ONCE
Status: COMPLETED | OUTPATIENT
Start: 2025-04-23 | End: 2025-04-23

## 2025-04-23 RX ORDER — OXYCODONE HYDROCHLORIDE 5 MG/1
TABLET ORAL
Status: COMPLETED
Start: 2025-04-23 | End: 2025-04-23

## 2025-04-23 RX ORDER — OXYCODONE AND ACETAMINOPHEN 10; 325 MG/1; MG/1
1 TABLET ORAL ONCE
Refills: 0 | Status: COMPLETED | OUTPATIENT
Start: 2025-04-23 | End: 2025-04-23

## 2025-04-23 RX ORDER — OXYCODONE HYDROCHLORIDE 5 MG/1
10 TABLET ORAL ONCE
Refills: 0 | Status: COMPLETED | OUTPATIENT
Start: 2025-04-23 | End: 2025-04-23

## 2025-04-23 RX ORDER — SODIUM CHLORIDE 9 MG/ML
INJECTION, SOLUTION INTRAVENOUS CONTINUOUS
Status: DISCONTINUED | OUTPATIENT
Start: 2025-04-23 | End: 2025-04-24 | Stop reason: HOSPADM

## 2025-04-23 RX ADMIN — LIDOCAINE HYDROCHLORIDE 5 ML: 10 INJECTION, SOLUTION INFILTRATION; PERINEURAL at 10:04

## 2025-04-23 RX ADMIN — OXYCODONE 10 MG: 5 TABLET ORAL at 12:04

## 2025-04-23 RX ADMIN — OXYCODONE AND ACETAMINOPHEN 1 TABLET: 10; 325 TABLET ORAL at 09:04

## 2025-04-23 RX ADMIN — IOHEXOL 9 ML: 300 INJECTION, SOLUTION INTRAVENOUS at 10:04

## 2025-04-23 NOTE — H&P
Radiology History & Physical      SUBJECTIVE:     Chief Complaint: bilateral lumbosacral pain    History of Present Illness:  Vick Gonzalez is a 69 y.o. male with history of cervical and lumbar fusions as well as spinal cord stimulator placement who presents for total spine myelography.     Past Medical History:   Diagnosis Date    Acute congestive heart failure 5/12/2023    Anemia     Anxiety     Cataract     Chronic pain syndrome 07/13/2011    CKD (chronic kidney disease), stage III     Coronary artery disease involving native coronary artery of native heart with angina pectoris 8/24/2023    Diabetes mellitus type II, uncontrolled     Discitis of lumbosacral region 01/16/2015    ED (erectile dysfunction)     Encounter for blood transfusion     Genital herpes     Gout, arthritis     History of alcohol abuse     History of hepatitis C, s/p successful Rx w/ SVR24 (cure) - 5/2018 08/23/2011    Completed 24wks Epclusa + RBV w/SVR12 - 2/2018  -     History of positive PPD, treatment status unknown     Pulmonary granulomas, negative sputum cultures for AFB and indeterminate quantferon test    History of substance abuse     Hypertension     Hypothyroidism     Liver replaced by transplant 08/23/2011    DATE: 12/16/2013  LIVER BIOPSY : REASON:  hep C staging  PATHOLOGY COMMITTEE NOTE/PLAN: :grade  1 / stage 1        Pancreatitis 2016    Peptic ulcer disease     Pulmonary emphysema, unspecified emphysema type 5/26/2023     Past Surgical History:   Procedure Laterality Date    ANTERIOR CERVICAL DISCECTOMY W/ FUSION N/A 8/22/2022    Procedure: Procedure: ACDF 4-7 LOS: 4.0 Anesthesia: General Blood: Type & Screen Radiology: C-Arm Microscope: ------- SNS: EMG, SEP, MEP Brace: [place] Bed: Regular Bed, Shoulder Strap Headrest:------ Position: Supine Equipment: Depuy;  Surgeon: Titi Christian MD;  Location: [facility] OR;  Service: Neurosurgery;  Laterality: N/A;  Depuy  confirmed CW 8/19  Neuro monitoring confirmed CW 8/19    CARPAL  TUNNEL RELEASE Left 10/29/2021    Procedure: RELEASE, CARPAL TUNNEL;  Surgeon: Jameson Alejo Jr., MD;  Location: [facility] OR;  Service: Orthopedics;  Laterality: Left;    CHOLECYSTECTOMY      CORONARY ANGIOGRAPHY N/A 8/2/2023    Procedure: ANGIOGRAM, CORONARY ARTERY;  Surgeon: Juan Vera MD;  Location: [facility] CATH LAB/EP;  Service: Cardiology;  Laterality: N/A;    DECOMPRESSION OF CERVICAL SPINE BY ANTERIOR APPROACH WITH FUSION  8/22/2022    Procedure: DECOMPRESSION AND FUSION, SPINE, CERVICAL, ANTERIOR APPROACH;  Surgeon: Titi Christian MD;  Location: [facility] OR;  Service: Neurosurgery;;    INJECTION OF JOINT Right 12/2/2019    Procedure: INJECTION, JOINT SI;  Surgeon: Thu Penn MD;  Location: [facility] PAIN MGT;  Service: Pain Management;  Laterality: Right;  RT SI JNT INJ    INSERTION OF SPINAL NEUROSTIMULATOR N/A 9/3/2024    Procedure: INSERTION, NEUROSTIMULATOR, SPINAL;  Surgeon: Khadar Payne MD;  Location: [facility] OR;  Service: Neurosurgery;  Laterality: N/A;  Procedure:C1-2 generator placement  Length of procedure:1.5 hours  LOS: 0-1 nights  Anesthesia:General  Blood:Type and screen  Radiology:C-arm  Bed:Regular Bed  Headrest:[place]  Position:Prone  Equipment:Kassandra Bob    LAMINECTOMY, SPINE, FOR NEUROSTIMULATOR ELECTRODE INSERTION N/A 8/29/2024    Procedure: LAMINECTOMY, SPINE, FOR NEUROSTIMULATOR ELECTRODE INSERTION;  Surgeon: Khadar Payne MD;  Location: [facility] OR;  Service: Neurosurgery;  Laterality: N/A;  Procedure: C1-2 laminectomy for paddle lead placement for spinal cord stimulator trial, extension   Length of procedure: 2 hours  LOS: 0 nights  Anesthesia:General  Blood:Type and screen  Radiology:C-arm  SNS:EMG,SEP,MEP  Position:Pro    LEFT HEART CATHETERIZATION Left 8/2/2023    Procedure: Left heart cath;  Surgeon: Juan Vera MD;  Location: [facility] CATH LAB/EP;  Service: Cardiology;  Laterality: Left;    LIVER TRANSPLANT  06/2010    SPINE SURGERY      TRANSFORAMINAL EPIDURAL INJECTION  OF STEROID Left 5/29/2023    Procedure: INJECTION, STEROID, EPIDURAL, TRANSFORAMINAL APPROACH,  LEFT C7-T1 DIRECT REF;  Surgeon: Thu Penn MD;  [facility];  Service: Pain Management;  Laterality: Left;  4/3 MD ILL/PT WILL C/B       Home Meds:   Prior to Admission medications    Medication Sig Start Date End Date Taking? Authorizing Provider   allopurinoL (ZYLOPRIM) 100 MG tablet TAKE 1 TABLET BY MOUTH TWICE A DAY 3/2/25  Yes Emanuel Lopez MD   aluminum-magnesium hydroxide-simethicone (MAALOX) 200-200-20 mg/5 mL Susp Take 30 mLs by mouth 4 (four) times daily before meals and nightly. 2/24/22  Yes Elisha Ying PA-C   aspirin (ECOTRIN) 81 MG EC tablet Take 1 tablet (81 mg total) by mouth once daily. 2/24/25  Yes Emanuel Lopez MD   blood-glucose meter,continuous (DEXCOM G7 ) Misc Use as directed. 9/20/24  Yes Emanuel Lopez MD   blood-glucose sensor (DEXCOM G7 SENSOR) Viviane Use as directed. 3/17/25  Yes Emanuel Lopez MD   blood-glucose transmitter (DEXCOM G6 TRANSMITTER) Viviane Use as directed 2/6/25  Yes Emanuel Lopez MD   calcium carbonate-vitamin D3 (CALCIUM 600 WITH VITAMIN D3) 600 mg(1,500mg) -400 unit Chew Take 1 tablet by mouth once daily.  6/27/12  Yes Provider, Historical   cyanocobalamin (VITAMIN B-12) 100 MCG tablet Take 100 mcg by mouth once daily.   Yes Provider, Historical   diphenhydrAMINE (BENADRYL) 25 mg capsule Take 25 mg by mouth daily as needed for Allergies (Cold).   Yes Provider, Historical   gabapentin (NEURONTIN) 800 MG tablet TAKE 1 TABLET BY MOUTH THREE TIMES A DAY 3/14/25  Yes Emanuel Lopez MD   insulin glargine U-300 conc (TOUJEO MAX U-300 SOLOSTAR) 300 unit/mL (3 mL) insulin pen Inject 40 Units into the skin once daily. 1/12/24  Yes Emanuel Lopez MD   insulin lispro (HUMALOG KWIKPEN INSULIN) 100 unit/mL pen Inject 20 Units into the skin 3 (three) times daily with meals. 8/26/24  Yes Emanuel Lopez MD  "  lancets 30 gauge Misc 1 lancet by Misc.(Non-Drug; Combo Route) route 4 (four) times daily before meals and nightly. 4/22/22  Yes Emanuel Lopez MD   levothyroxine (SYNTHROID) 88 MCG tablet TAKE 1 TABLET BY MOUTH EVERY DAY 10/1/24  Yes Emanuel Lopez MD   lisinopriL (PRINIVIL,ZESTRIL) 20 MG tablet TAKE 1 TABLET BY MOUTH EVERY DAY 10/1/24  Yes Deepthi Da Silva PA-C   methocarbamoL (ROBAXIN) 750 MG Tab Take 1 tablet (750 mg total) by mouth 3 (three) times daily as needed (muscle spasms). 9/17/24  Yes Paige Gilbert PA-C   metoprolol succinate (TOPROL-XL) 200 MG 24 hr tablet Take 1 tablet (200 mg total) by mouth once daily. 5/14/24  Yes Emanuel Lopez MD   multivit with min-folic acid (MEN'S MULTIVITAMIN GUMMIES) 200 mcg Chew Take 1 tablet by mouth once daily.   Yes Provider, Historical   NIFEdipine (PROCARDIA-XL) 30 MG (OSM) 24 hr tablet TAKE 1 TABLET BY MOUTH EVERY DAY 6/14/24  Yes Mulu Robison MD   NOVOFINE PLUS 32 gauge x 1/6" Ndle  12/8/17  Yes Provider, Historical   oxyCODONE-acetaminophen (PERCOCET)  mg per tablet Take 1 tablet by mouth every 8 (eight) hours as needed for Pain. Quantity greater than 7 day supply medically necessary 4/14/25 5/14/25 Yes Khadar Payne MD   papaverine 30 mg/mL injection Tri-Mix - PGE (alprostadil) 10mcg, papavarine 30mg, phentolamine 1mg; dispense 5mL vial, typical starting is 0.2 mL which is equal to 20 units  Patient taking differently: as needed. Tri-Mix - PGE (alprostadil) 10mcg, papavarine 30mg, phentolamine 1mg; dispense 5mL vial, typical starting is 0.2 mL which is equal to 20 units 9/15/23  Yes Jacques Haro MD   polyethylene glycol (MIRALAX) 17 gram PwPk Take 17 g by mouth once daily. 3/17/25  Yes Emanuel Lopez MD   sildenafiL (VIAGRA) 100 MG tablet Take 1 tablet (100 mg total) by mouth daily as needed for Erectile Dysfunction. 5/24/24 5/24/25 Yes Jacques Haro MD   sildenafiL (VIAGRA) 50 MG tablet Take 1 tablet " (50 mg total) by mouth daily as needed for Erectile Dysfunction. 5/7/24 5/7/25 Yes Jayson Winters MD   tacrolimus (PROGRAF) 1 MG Cap Take 2 capsules (2 mg total) by mouth every morning AND 1 capsule (1 mg total) every evening. 12/17/24  Yes James Coleman MD   tirzepatide (MOUNJARO) 2.5 mg/0.5 mL PnIj Inject 2.5 mg into the skin every 7 days. 3/17/25  Yes Emanuel Lopez MD   traZODone (DESYREL) 50 MG tablet TAKE 1 TABLET BY MOUTH EVERY DAY IN THE EVENING 10/1/24  Yes Emanuel Lopez MD   TRUE METRIX GLUCOSE TEST STRIP Strp USE 3 TIMES DAILY TO TEST BLOOD GLUCOSE LEVEL 11/27/23  Yes Emanuel Lopez MD   atorvastatin (LIPITOR) 40 MG tablet Take 1 tablet (40 mg total) by mouth once daily. 8/3/23 8/26/24  Ca Hayes MD   blood-glucose meter Misc 1 Device by Misc.(Non-Drug; Combo Route) route 3 (three) times daily. 9/7/22 8/26/24  Taryn Corcoran MD   furosemide (LASIX) 20 MG tablet Take 2 tablets (40 mg total) by mouth daily as needed (For Weight Gain > 2-3 lbs in 1 day or 4-6 lbs over 1 week notify PCP and take 40 mg daily for three days).  Patient not taking: Reported on 4/1/2024 5/21/23 12/11/23  Velasquez Navarrete MD   insulin aspart U-100 (NOVOLOG FLEXPEN U-100 INSULIN) 100 unit/mL (3 mL) InPn pen Inject 20 Units into the skin 3 (three) times daily with meals. 2/23/21 12/9/21  Emanuel Lopez MD     Anticoagulants/Antiplatelets: aspirin - held    Allergies: Review of patient's allergies indicates:  No Known Allergies  Sedation History:  have not been any systemic reactions    Labs:  Lab Results   Component Value Date    INR 1.1 07/30/2024       Lab Results   Component Value Date    WBC 8.89 03/31/2025    HGB 12.3 (L) 03/31/2025    HCT 39.8 (L) 03/31/2025    MCV 93 03/31/2025     (L) 03/31/2025     Lab Results   Component Value Date     (H) 03/12/2025     03/31/2025    K 3.9 03/31/2025     03/31/2025    CO2 24 03/31/2025    BUN 16 03/31/2025     CREATININE 1.2 03/31/2025    CALCIUM 9.5 03/31/2025    MG 1.8 07/01/2024    ALT 22 03/31/2025    AST 17 03/31/2025    ALBUMIN 3.3 (L) 03/31/2025    BILITOT 0.3 03/31/2025    BILIDIR 0.1 02/28/2012       Review of Systems:   Hematological: no known coagulopathies  Respiratory: no shortness of breath  Cardiovascular: no chest pain  Gastrointestinal: no abdominal pain  Genito-Urinary: no dysuria  Musculoskeletal: negative  Neurological: no TIA or stroke symptoms         OBJECTIVE:     Vital Signs (Most Recent)  Temp: 98.3 °F (36.8 °C) (04/23/25 0810)  Pulse: 90 (04/23/25 0810)  Resp: 15 (04/23/25 0945)  BP: (!) 186/94 (04/23/25 0810)  SpO2: 100 % (04/23/25 0810)    Physical Exam:  General: no acute distress  Mental Status: alert and oriented to person, place and time  HEENT: normocephalic, atraumatic  Chest: unlabored breathing  Heart: regular heart rate  Abdomen: nondistended; transverse incisional scar  Extremity: moves all extremities    ASSESSMENT/PLAN:   Risks and benefits of the procedure discussed with patient prior to obtaining consent. Will proceed with total spine myelogram.     Sedation Plan: local only      Ricardo Lim MD  Diagnostic Radiology

## 2025-04-23 NOTE — PROCEDURES
Radiology Post-Procedure Note    Pre Op Diagnosis: bilateral lumbosacral pain  Post Op Diagnosis: Same    Procedure: Fluoroscopy guided lumbar puncture with intrathecal contrast administration    Procedure performed by: Ricardo Lim MD    Written Informed Consent Obtained: Yes  Specimen Removed: none  Estimated Blood Loss: Minimal    Findings:   The Patient was cleaned and draped in a sterile fashion. Local anesthesia was performed using 5 cc of lidocaine prior to starting the procedure. Lumbar puncture was performed at L2-L3 with a 12.7 cm (5 inch), 22 gauge needle. No CSF was collected. Approximately 9 cc of Omnipaque 300 was injected under fluoroscopic guidance with confirmatory images taken. The patient was then transferred to CT for total spine imaging.     Successful lumbar puncture with intrathecal contrast administration performed with fluoroscopic guidance.    Patient tolerated procedure well.    Ricardo Lim MD  Diagnostic Radiology

## 2025-04-23 NOTE — TELEPHONE ENCOUNTER
Contacted pt informed him that  would like to see him in office to discuss his results of his CT myelogram. I scheduled pt for April 30 at 830 am to see . Pt confirmed and voiced understanding

## 2025-04-23 NOTE — NURSING
Doctor told patient he would order oxycodine for his pain awaiting order.  Report given back to TONY Garay

## 2025-04-23 NOTE — NURSING
Pt given discharge instructions with all questions addressed. Pt advised to call the clinic and or hospital with any other questions or concerns

## 2025-04-23 NOTE — NURSING
Received bedside report from TONY Garay.  Patient A/O x 4 complaints of lower back pain 6 on pain scale.  MD at bedside to  eval patient pain.

## 2025-04-30 ENCOUNTER — TELEPHONE (OUTPATIENT)
Dept: NEUROSURGERY | Facility: CLINIC | Age: 70
End: 2025-04-30

## 2025-04-30 ENCOUNTER — OFFICE VISIT (OUTPATIENT)
Dept: NEUROSURGERY | Facility: CLINIC | Age: 70
End: 2025-04-30
Payer: MEDICARE

## 2025-04-30 ENCOUNTER — TELEPHONE (OUTPATIENT)
Dept: FAMILY MEDICINE | Facility: CLINIC | Age: 70
End: 2025-04-30
Payer: MEDICARE

## 2025-04-30 VITALS
BODY MASS INDEX: 30.81 KG/M2 | HEIGHT: 74 IN | WEIGHT: 240.06 LBS | HEART RATE: 80 BPM | SYSTOLIC BLOOD PRESSURE: 193 MMHG | DIASTOLIC BLOOD PRESSURE: 95 MMHG

## 2025-04-30 DIAGNOSIS — D69.6 THROMBOCYTOPENIA: ICD-10-CM

## 2025-04-30 DIAGNOSIS — Z98.1 ADJACENT SEGMENT DISEASE OF LUMBAR SPINE WITH HISTORY OF FUSION PROCEDURE: Primary | ICD-10-CM

## 2025-04-30 DIAGNOSIS — N18.30 STAGE 3 CHRONIC KIDNEY DISEASE, UNSPECIFIED WHETHER STAGE 3A OR 3B CKD: ICD-10-CM

## 2025-04-30 DIAGNOSIS — E11.42 TYPE 2 DIABETES MELLITUS WITH DIABETIC POLYNEUROPATHY, WITH LONG-TERM CURRENT USE OF INSULIN: ICD-10-CM

## 2025-04-30 DIAGNOSIS — Z01.818 PRE-OP TESTING: Primary | ICD-10-CM

## 2025-04-30 DIAGNOSIS — G89.4 CHRONIC PAIN SYNDROME: ICD-10-CM

## 2025-04-30 DIAGNOSIS — Z94.4 S/P LIVER TRANSPLANT: Chronic | ICD-10-CM

## 2025-04-30 DIAGNOSIS — Z98.1 S/P LUMBAR FUSION: ICD-10-CM

## 2025-04-30 DIAGNOSIS — Z98.1 STATUS POST CERVICAL SPINAL FUSION: ICD-10-CM

## 2025-04-30 DIAGNOSIS — Z79.4 TYPE 2 DIABETES MELLITUS WITH DIABETIC POLYNEUROPATHY, WITH LONG-TERM CURRENT USE OF INSULIN: ICD-10-CM

## 2025-04-30 DIAGNOSIS — M48.07 SPINAL STENOSIS, LUMBOSACRAL REGION: ICD-10-CM

## 2025-04-30 DIAGNOSIS — M51.369 ADJACENT SEGMENT DISEASE OF LUMBAR SPINE WITH HISTORY OF FUSION PROCEDURE: Primary | ICD-10-CM

## 2025-04-30 DIAGNOSIS — R29.818 NEUROGENIC CLAUDICATION: ICD-10-CM

## 2025-04-30 DIAGNOSIS — Z96.89 SPINAL CORD STIMULATOR STATUS: ICD-10-CM

## 2025-04-30 DIAGNOSIS — E03.9 ACQUIRED HYPOTHYROIDISM: ICD-10-CM

## 2025-04-30 DIAGNOSIS — I73.9 PAD (PERIPHERAL ARTERY DISEASE): ICD-10-CM

## 2025-04-30 DIAGNOSIS — M48.02 SPINAL STENOSIS, CERVICAL REGION: ICD-10-CM

## 2025-04-30 PROCEDURE — 3288F FALL RISK ASSESSMENT DOCD: CPT | Mod: CPTII,S$GLB,, | Performed by: NEUROLOGICAL SURGERY

## 2025-04-30 PROCEDURE — 3066F NEPHROPATHY DOC TX: CPT | Mod: CPTII,S$GLB,, | Performed by: NEUROLOGICAL SURGERY

## 2025-04-30 PROCEDURE — 1125F AMNT PAIN NOTED PAIN PRSNT: CPT | Mod: CPTII,S$GLB,, | Performed by: NEUROLOGICAL SURGERY

## 2025-04-30 PROCEDURE — 99214 OFFICE O/P EST MOD 30 MIN: CPT | Mod: S$GLB,,, | Performed by: NEUROLOGICAL SURGERY

## 2025-04-30 PROCEDURE — 3008F BODY MASS INDEX DOCD: CPT | Mod: CPTII,S$GLB,, | Performed by: NEUROLOGICAL SURGERY

## 2025-04-30 PROCEDURE — 1159F MED LIST DOCD IN RCRD: CPT | Mod: CPTII,S$GLB,, | Performed by: NEUROLOGICAL SURGERY

## 2025-04-30 PROCEDURE — 1101F PT FALLS ASSESS-DOCD LE1/YR: CPT | Mod: CPTII,S$GLB,, | Performed by: NEUROLOGICAL SURGERY

## 2025-04-30 PROCEDURE — 99999 PR PBB SHADOW E&M-EST. PATIENT-LVL IV: CPT | Mod: PBBFAC,,, | Performed by: NEUROLOGICAL SURGERY

## 2025-04-30 PROCEDURE — 3080F DIAST BP >= 90 MM HG: CPT | Mod: CPTII,S$GLB,, | Performed by: NEUROLOGICAL SURGERY

## 2025-04-30 PROCEDURE — 3077F SYST BP >= 140 MM HG: CPT | Mod: CPTII,S$GLB,, | Performed by: NEUROLOGICAL SURGERY

## 2025-04-30 PROCEDURE — 3044F HG A1C LEVEL LT 7.0%: CPT | Mod: CPTII,S$GLB,, | Performed by: NEUROLOGICAL SURGERY

## 2025-04-30 PROCEDURE — 3060F POS MICROALBUMINURIA REV: CPT | Mod: CPTII,S$GLB,, | Performed by: NEUROLOGICAL SURGERY

## 2025-04-30 NOTE — TELEPHONE ENCOUNTER
He needs a visit for preop.  I see that he is set up with Dr. Winters on 05/06/2025 since I am out next week.  He can get these labs before he sees her along with a chest x-ray and EKG shows so she has a available    Orders Placed This Encounter   Procedures    X-Ray Chest PA And Lateral    CBC Auto Differential    Comprehensive Metabolic Panel    Urinalysis    APTT    Protime-INR    SCHEDULED EKG 12-LEAD (to Muse)

## 2025-04-30 NOTE — TELEPHONE ENCOUNTER
Good Morning,  This patient is scheduled to have a spinal surgery with  on May 16, he will need a pre-op clearance appointment. He will need the following test completed      CBC  CMP  PT/PTT  Urinlaysis    EKG  Chest Xray.    He will also need to know when to stop taking his monjuaro and Humalog.      Thanks,  MT

## 2025-04-30 NOTE — PROGRESS NOTES
NEUROSURGICAL PROGRESS NOTE    DATE OF SERVICE:  04/30/2025    ATTENDING PHYSICIAN:  Khadar Payne MD    SUBJECTIVE:    INTERIM HISTORY:    He is complaining of worsening low back pain, radiating down the bilateral buttock and posterolateral thigh.  He has difficulty standing upright and has to lean forward when he walks.  He is walking using a cane.  Pain is affecting his ability to stand, walk.  He is unable to do much physical activity at this time due to the severe pain.  He takes gabapentin 800 mg 3 times daily.  He takes Percocet 10 mg 3 times daily.  He is a nonsmoker.  He is not drinking at this time.    He is using his cervical spinal cord stimulator.  His neck pain has significantly improved following the spinal cord stimulator placement.  He does not complain of severe left arm pain anymore.  He has gait imbalance that has remained stable.              PAST MEDICAL HISTORY:  Active Ambulatory Problems     Diagnosis Date Noted    Chronic pain syndrome 07/13/2011    Acquired hypothyroidism     History of hepatitis C, s/p successful Rx w/ [id number] (cure) - 5/2018 08/23/2011    Gout, unspecified 03/14/2011    Substance abuse 10/24/2010    Thrombocytopenia 06/16/2010    Anxiety     Lumbar radiculopathy 04/08/2015    Acquired spondylolisthesis 05/12/2015    Essential hypertension 06/19/2015    Type 2 diabetes mellitus with diabetic polyneuropathy, with long-term current use of insulin 10/04/2016    S/P liver transplant 10/04/2016    Hypertriglyceridemia 10/05/2016    CKD (chronic kidney disease), stage III     PAD (peripheral artery disease) 08/30/2017    Erectile dysfunction due to arterial insufficiency 10/09/2017    BPH with urinary obstruction 10/09/2017    Immunosuppressed status 10/16/2017    Low testosterone in male 07/25/2018    Hypertension associated with diabetes 12/01/2020    Hyperlipidemia associated with type 2 diabetes mellitus 12/01/2020    Erectile dysfunction associated with type 2 diabetes  mellitus 12/01/2020    Claudication in peripheral vascular disease 12/01/2020    Aortic atherosclerosis 11/16/2010    Calcified granuloma of lung 10/22/2010    Carpal tunnel syndrome of left wrist 07/13/2021    Acute congestive heart failure 05/12/2023    Alcohol dependence, in remission 05/26/2023    Pulmonary emphysema, unspecified emphysema type 05/26/2023    Coronary artery disease involving native coronary artery of native heart with angina pectoris 08/24/2023    Epistaxis 12/04/2023    Complex regional pain syndrome type 2 of left upper extremity 07/12/2024    Cervical myelopathy 08/26/2024    Hepatic cirrhosis, unspecified hepatic cirrhosis type, unspecified whether ascites present 08/26/2024    Polyneuropathy associated with underlying disease 08/26/2024    S/P insertion of spinal cord stimulator 10/17/2024     Resolved Ambulatory Problems     Diagnosis Date Noted    Abdominal pain, generalized 08/25/2011    Abdominal pain, right upper quadrant 09/16/2010    Abdominal tenderness, right upper quadrant 05/15/2011    Unspecified chronic liver disease without mention of alcohol     Hypertension     Acute alcoholic hepatitis 08/02/2010    Acute bronchitis 03/14/2011    Acute gouty arthropathy 08/28/2011    Aftercare following organ transplant 06/17/2010    Alcoholic cirrhosis of liver 10/11/2010    Anemia of other chronic disease 07/15/2010    Cholangitis 07/15/2010    Chronic hepatitis C with hepatic coma 06/08/2011    Cirrhosis of liver without mention of alcohol 07/28/2011    Complications of transplanted liver 05/11/2011    Dietary surveillance and counseling 07/28/2011    Encephalopathy, unspecified 06/16/2010    Family history of diabetes mellitus 07/15/2010    Hematuria, unspecified 02/11/2012    Hepatomegaly 08/23/2011    Liver replaced by transplant 08/23/2011    Encounter for long-term (current) use of steroids 06/02/2011    Encounter for long-term (current) use of aspirin 01/16/2011    Microscopic  hematuria 05/11/2011    Mononeuritis of unspecified site 07/15/2010    Need for prophylactic immunotherapy 06/02/2011    Nocturia 11/02/2010    Alcohol abuse, in remission 06/02/2010    Obstruction of bile duct 10/24/2010    Open wound of finger(s) , without mention of complication 07/12/2011    Other and unspecified alcohol dependence, unspecified drinking behavior 01/16/2011    Other ascites 06/16/2010    Other cells and casts in urine 11/02/2010    Other sequelae of chronic liver disease 07/28/2011    Other specified disorders of biliary tract 10/23/2010    Other specified disorders of liver 09/26/2010    Peptic ulcer, unspecified site, unspecified as acute or chronic, without mention of hemorrhage, perforation, or obstruction 10/20/2010    Personal history of other infectious and parasitic disease 11/15/2010    Portal hypertension 07/26/2010    Secondary diabetes mellitus without mention of complication, uncontrolled 10/24/2010    Unspecified disorder of male genital organs 11/02/2010    Genital herpes, unspecified 06/02/2011    Orchitis and epididymitis, unspecified 10/12/2010    Unspecified viral hepatitis C without hepatic coma 10/20/2010    Hepatitis C 12/10/2012    Genital herpes     Hyperpigmentation 10/21/2014    Diskitis 01/15/2015    Lower back pain 01/15/2015    Discitis of lumbosacral region 01/16/2015    Lumbar discitis 06/15/2015    Lumbar myelopathy 06/18/2015    Abnormal gait 07/09/2015    Muscle weakness 07/09/2015    LBP (low back pain) 07/09/2015    S/P lumbar spinal fusion 08/11/2015    Hyponatremia 10/04/2016    OSMANY (acute kidney injury) 10/04/2016    Volume depletion 02/28/2017    Ischemic leg 08/30/2017    Sacroiliitis 12/02/2019    Abdominal pain 01/23/2020    SBO (small bowel obstruction)     Sepsis 11/11/2020    Transaminitis 11/11/2020    Right lower quadrant abdominal pain 11/12/2020    Diarrhea 11/13/2020    Severe obesity (BMI 35.0-39.9) with comorbidity 12/01/2020    Diabetic  polyneuropathy associated with type 2 diabetes mellitus 12/01/2020    Penetrating foot wound 10/11/2021    Diabetic foot infection 10/11/2021    Left hand weakness 02/01/2022    Fine motor impairment 02/14/2022    Loss of feeling or sensation 02/14/2022    Myeloradiculopathy 08/22/2022    Chronic neck pain with history of cervical spinal surgery 01/10/2023    Hypomagnesemia 05/13/2023    NSTEMI (non-ST elevated myocardial infarction) 08/02/2023     Past Medical History:   Diagnosis Date    Anemia     Cataract     Diabetes mellitus type II, uncontrolled     ED (erectile dysfunction)     Encounter for blood transfusion     Gout, arthritis     History of alcohol abuse     History of positive PPD, treatment status unknown     History of substance abuse     Hypothyroidism     Pancreatitis 2016    Peptic ulcer disease        PAST SURGICAL HISTORY:  Past Surgical History:   Procedure Laterality Date    ANTERIOR CERVICAL DISCECTOMY W/ FUSION N/A 8/22/2022    Procedure: Procedure: ACDF 4-7 LOS: 4.0 Anesthesia: General Blood: Type & Screen Radiology: C-Arm Microscope: ------- SNS: EMG, SEP, MEP Brace: [place] Bed: Regular Bed, Shoulder Strap Headrest:------ Position: Supine Equipment: Depuy;  Surgeon: Titi Christian MD;  Location: [facility] OR;  Service: Neurosurgery;  Laterality: N/A;  Depuy  confirmed CW 8/19  Neuro monitoring confirmed CW 8/19    CARPAL TUNNEL RELEASE Left 10/29/2021    Procedure: RELEASE, CARPAL TUNNEL;  Surgeon: Jameson Alejo Jr., MD;  Location: [facility] OR;  Service: Orthopedics;  Laterality: Left;    CHOLECYSTECTOMY      CORONARY ANGIOGRAPHY N/A 8/2/2023    Procedure: ANGIOGRAM, CORONARY ARTERY;  Surgeon: Juan Vera MD;  Location: [facility] CATH LAB/EP;  Service: Cardiology;  Laterality: N/A;    DECOMPRESSION OF CERVICAL SPINE BY ANTERIOR APPROACH WITH FUSION  8/22/2022    Procedure: DECOMPRESSION AND FUSION, SPINE, CERVICAL, ANTERIOR APPROACH;  Surgeon: Titi Christian MD;  Location: [facility] OR;  Service:  Neurosurgery;;    INJECTION OF JOINT Right 12/2/2019    Procedure: INJECTION, JOINT SI;  Surgeon: Thu Penn MD;  Location: [facility] PAIN MGT;  Service: Pain Management;  Laterality: Right;  RT SI JNT INJ    INSERTION OF SPINAL NEUROSTIMULATOR N/A 9/3/2024    Procedure: INSERTION, NEUROSTIMULATOR, SPINAL;  Surgeon: Khadar Payne MD;  Location: [facility] OR;  Service: Neurosurgery;  Laterality: N/A;  Procedure:C1-2 generator placement  Length of procedure:1.5 hours  LOS: 0-1 nights  Anesthesia:General  Blood:Type and screen  Radiology:C-arm  Bed:Regular Bed  Headrest:[place]  Position:Prone  Equipment:Vacatia    LAMINECTOMY, SPINE, FOR NEUROSTIMULATOR ELECTRODE INSERTION N/A 8/29/2024    Procedure: LAMINECTOMY, SPINE, FOR NEUROSTIMULATOR ELECTRODE INSERTION;  Surgeon: Khadar Payne MD;  Location: [facility] OR;  Service: Neurosurgery;  Laterality: N/A;  Procedure: C1-2 laminectomy for paddle lead placement for spinal cord stimulator trial, extension   Length of procedure: 2 hours  LOS: 0 nights  Anesthesia:General  Blood:Type and screen  Radiology:C-arm  SNS:EMG,SEP,MEP  Position:Pro    LEFT HEART CATHETERIZATION Left 8/2/2023    Procedure: Left heart cath;  Surgeon: Juan Vera MD;  Location: [facility] CATH LAB/EP;  Service: Cardiology;  Laterality: Left;    LIVER TRANSPLANT  06/2010    SPINE SURGERY      TRANSFORAMINAL EPIDURAL INJECTION OF STEROID Left 5/29/2023    Procedure: INJECTION, STEROID, EPIDURAL, TRANSFORAMINAL APPROACH,  LEFT C7-T1 DIRECT REF;  Surgeon: Thu Penn MD;  Location: [facility] PAIN MGT;  Service: Pain Management;  Laterality: Left;  4/3 MD ILL/PT WILL C/B       SOCIAL HISTORY:   Social History[1]    FAMILY HISTORY:  Family History   Problem Relation Name Age of Onset    Cancer Mother      Diabetes Mother      Heart disease Mother      Diabetes Sister      Cancer Maternal Uncle  82        colon CA    Drug abuse Daughter      Melanoma Neg Hx      Psoriasis Neg Hx      Lupus Neg Hx      Eczema  Neg Hx      Acne Neg Hx         CURRENTS MEDICATIONS:  Medications Ordered Prior to Encounter[2]    ALLERGIES:  Review of patient's allergies indicates:  No Known Allergies    REVIEW OF SYSTEMS:  Review of Systems   Constitutional:  Negative for diaphoresis, fever and weight loss.   Respiratory:  Negative for shortness of breath.    Cardiovascular:  Negative for chest pain.   Gastrointestinal:  Negative for blood in stool.   Genitourinary:  Negative for hematuria.   Endo/Heme/Allergies:  Does not bruise/bleed easily.   All other systems reviewed and are negative.        OBJECTIVE:    PHYSICAL EXAMINATION:   Vitals:    04/30/25 0814   BP: (!) 193/95   Pulse: 80       Physical Exam:  Vitals reviewed.    Constitutional: He appears well-developed and well-nourished.     Eyes: Pupils are equal, round, and reactive to light. Conjunctivae and EOM are normal.     Cardiovascular: Normal distal pulses and no edema.     Abdominal: Soft.     Skin: Skin displays no rash on trunk and no rash on extremities. Skin displays no lesions on trunk and no lesions on extremities.     Psych/Behavior: He is alert. He is oriented to person, place, and time. He has a normal mood and affect.     Musculoskeletal:        Neck: Range of motion is full.     Neurological:        DTRs: Tricep reflexes are 2+ on the right side and 2+ on the left side. Bicep reflexes are 2+ on the right side and 2+ on the left side. Brachioradialis reflexes are 2+ on the right side and 2+ on the left side. Patellar reflexes are 2+ on the right side and 2+ on the left side. Achilles reflexes are 0 on the right side and 0 on the left side.       Back Exam     Muscle Strength   Right Quadriceps:  5/5   Left Quadriceps:  5/5   Right Hamstrings:  5/5   Left Hamstrings:  5/5                 Neurological Exam  Mental Status  Alert. Oriented to person, place, and time. Speech is normal.    Cranial Nerves  CN III, IV, VI: Extraocular movements intact bilaterally. Pupils equal  round and reactive to light bilaterally.    Motor   Normal muscle tone.                                               Right                     Left  Deltoid                                   5                          5   Biceps                                   5                          5   Triceps                                  5                          5   Interossei                              5                          5   Iliopsoas                               4+                          4+   Quadriceps                           5                          5   Hamstring                             5                          5   Gastrocnemius                     5                           5   Anterior tibialis                      5                          5   Posterior tibialis                    5                          5   Peroneal                               5                          5    Sensory  Light touch is normal in upper and lower extremities. Pinprick is normal in upper and lower extremities.     Reflexes                                            Right                      Left  Brachioradialis                    2+                         2+  Biceps                                 2+                         2+  Triceps                                2+                         2+  Patellar                                2+                         2+  Achilles                                0                         0  Right Plantar: normal  Left Plantar: normal    Right pathological reflexes: Olu's absent. Ankle clonus absent.  Left pathological reflexes: Olu's absent. Ankle clonus absent.        DIAGNOSTIC DATA:  I personally interpreted the following imaging:   CT myelogram of the cervical, thoracic and lumbar spine shows severe L4-5 spinal stenosis.  Status post L5-S1 fusion    ASSESSMENT:  This is a 69 y.o. male with     Problem List Items Addressed This Visit           Neuro    Chronic pain syndrome     Other Visit Diagnoses         Adjacent segment disease of lumbar spine with history of fusion procedure    -  Primary      Status post cervical spinal fusion          Spinal cord stimulator status          Neurogenic claudication                  PLAN:  I explained the natural history of the disease and all treatment options.  In order to treat his neurogenic claudication symptoms I recommended a left L4-5 oblique interbody fusion, placement of interbody spacer, DePuy Conduit LLIF spacer filled with allograft BMP and DBM, L4-5 anterior instrumentation with separate plate, DePuy Switch plate, L4-5 laminectomy, medial facetectomy, foraminotomy, L4-5 posterolateral arthrodesis using autograft harvested from the same incision and allograft DBM.     We have discussed the risks of surgery including death, coma, bleeding, infection, failure of surgery, CSF leak, nerve root injury, spinal cord injury, ureter injury, weakness, paralysis, peripheral neuropathy, malplaced hardware, migration of hardware, non-union, need for reoperation. Patient understands the risks and would like to proceed with surgery.    More than 50% of the time was spent on discussing conservative management treatments (medication, physical therapy exercises) and possible interventions (spinal injections and surgical procedures). Care coordination was discussed.    30 min      Khadar Payne MD  Cell:[phone]           [1]   Social History  Socioeconomic History    Marital status:    Tobacco Use    Smoking status: Former     Passive exposure: Never    Smokeless tobacco: Never   Substance and Sexual Activity    Alcohol use: No     Comment: over 5 years ago, none currently    Drug use: Not Currently     Comment: Former cocaine use    Sexual activity: Yes     Social Drivers of Health     Financial Resource Strain: Low Risk  (8/30/2024)    Overall Financial Resource Strain (CARDIA)     Difficulty of Paying Living  Expenses: Not hard at all   Food Insecurity: No Food Insecurity (8/30/2024)    Hunger Vital Sign     Worried About Running Out of Food in the Last Year: Never true     Ran Out of Food in the Last Year: Never true   Transportation Needs: No Transportation Needs (8/30/2024)    TRANSPORTATION NEEDS     Transportation : No   Physical Activity: Sufficiently Active (8/30/2024)    Exercise Vital Sign     Days of Exercise per Week: 7 days     Minutes of Exercise per Session: 30 min   Recent Concern: Physical Activity - Insufficiently Active (8/22/2024)    Exercise Vital Sign     Days of Exercise per Week: 2 days     Minutes of Exercise per Session: 20 min   Stress: No Stress Concern Present (8/30/2024)    [employer] - Occupational Stress Questionnaire     Feeling of Stress : Not at all   Recent Concern: Stress - Stress Concern Present (8/22/2024)    [employer] - Occupational Stress Questionnaire     Feeling of Stress : To some extent   Housing Stability: Unknown (8/30/2024)    Housing Stability Vital Sign     Unable to Pay for Housing in the Last Year: No     Homeless in the Last Year: No   [2]   Current Outpatient Medications on File Prior to Visit   Medication Sig Dispense Refill    allopurinoL (ZYLOPRIM) 100 MG tablet TAKE 1 TABLET BY MOUTH TWICE A  tablet 3    aluminum-magnesium hydroxide-simethicone (MAALOX) 200-200-20 mg/5 mL Susp Take 30 mLs by mouth 4 (four) times daily before meals and nightly. 769 mL 0    aspirin (ECOTRIN) 81 MG EC tablet Take 1 tablet (81 mg total) by mouth once daily.      atorvastatin (LIPITOR) 40 MG tablet Take 1 tablet (40 mg total) by mouth once daily. 90 tablet 3    blood-glucose meter,continuous (DEXCOM G7 ) Misc Use as directed. 1 each 11    blood-glucose sensor (DEXCOM G7 SENSOR) Viviane Use as directed. 3 each 11    blood-glucose transmitter (DEXCOM G6 TRANSMITTER) Viviane Use as directed 1 each 11    calcium  "carbonate-vitamin D3 (CALCIUM 600 WITH VITAMIN D3) 600 mg(1,500mg) -400 unit Chew Take 1 tablet by mouth once daily.       cyanocobalamin (VITAMIN B-12) 100 MCG tablet Take 100 mcg by mouth once daily.      diphenhydrAMINE (BENADRYL) 25 mg capsule Take 25 mg by mouth daily as needed for Allergies (Cold).      furosemide (LASIX) 20 MG tablet Take 2 tablets (40 mg total) by mouth daily as needed (For Weight Gain > 2-3 lbs in 1 day or 4-6 lbs over 1 week notify PCP and take 40 mg daily for three days). 60 tablet 0    gabapentin (NEURONTIN) 800 MG tablet TAKE 1 TABLET BY MOUTH THREE TIMES A DAY 90 tablet 11    insulin glargine U-300 conc (TOUJEO MAX U-300 SOLOSTAR) 300 unit/mL (3 mL) insulin pen Inject 40 Units into the skin once daily. 3 mL 11    insulin lispro (HUMALOG KWIKPEN INSULIN) 100 unit/mL pen Inject 20 Units into the skin 3 (three) times daily with meals. 60 mL 11    lancets 30 gauge Misc 1 lancet by Misc.(Non-Drug; Combo Route) route 4 (four) times daily before meals and nightly. 200 each 11    levothyroxine (SYNTHROID) 88 MCG tablet TAKE 1 TABLET BY MOUTH EVERY DAY 90 tablet 3    lisinopriL (PRINIVIL,ZESTRIL) 20 MG tablet TAKE 1 TABLET BY MOUTH EVERY DAY 90 tablet 3    methocarbamoL (ROBAXIN) 750 MG Tab Take 1 tablet (750 mg total) by mouth 3 (three) times daily as needed (muscle spasms). 60 tablet 0    metoprolol succinate (TOPROL-XL) 200 MG 24 hr tablet Take 1 tablet (200 mg total) by mouth once daily. 90 tablet 3    multivit with min-folic acid (MEN'S MULTIVITAMIN GUMMIES) 200 mcg Chew Take 1 tablet by mouth once daily.      NIFEdipine (PROCARDIA-XL) 30 MG (OSM) 24 hr tablet TAKE 1 TABLET BY MOUTH EVERY DAY 90 tablet 3    NOVOFINE PLUS 32 gauge x 1/6" Ndle       oxyCODONE-acetaminophen (PERCOCET)  mg per tablet Take 1 tablet by mouth every 8 (eight) hours as needed for Pain. Quantity greater than 7 day supply medically necessary 90 tablet 0    papaverine 30 mg/mL injection Tri-Mix - PGE " (alprostadil) 10mcg, papavarine 30mg, phentolamine 1mg; dispense 5mL vial, typical starting is 0.2 mL which is equal to 20 units (Patient taking differently: as needed. Tri-Mix - PGE (alprostadil) 10mcg, papavarine 30mg, phentolamine 1mg; dispense 5mL vial, typical starting is 0.2 mL which is equal to 20 units) 10 mL 5    polyethylene glycol (MIRALAX) 17 gram PwPk Take 17 g by mouth once daily.      sildenafiL (VIAGRA) 100 MG tablet Take 1 tablet (100 mg total) by mouth daily as needed for Erectile Dysfunction. 30 tablet 11    sildenafiL (VIAGRA) 50 MG tablet Take 1 tablet (50 mg total) by mouth daily as needed for Erectile Dysfunction. 30 tablet 0    tacrolimus (PROGRAF) 1 MG Cap Take 2 capsules (2 mg total) by mouth every morning AND 1 capsule (1 mg total) every evening. 90 capsule 11    tirzepatide (MOUNJARO) 2.5 mg/0.5 mL PnIj Inject 2.5 mg into the skin every 7 days. 4 Pen 11    traZODone (DESYREL) 50 MG tablet TAKE 1 TABLET BY MOUTH EVERY DAY IN THE EVENING 90 tablet 3    TRUE METRIX GLUCOSE TEST STRIP Strp USE 3 TIMES DAILY TO TEST BLOOD GLUCOSE LEVEL 100 strip 11    blood-glucose meter Misc 1 Device by Misc.(Non-Drug; Combo Route) route 3 (three) times daily. 1 each 0    [DISCONTINUED] insulin aspart U-100 (NOVOLOG FLEXPEN U-100 INSULIN) 100 unit/mL (3 mL) InPn pen Inject 20 Units into the skin 3 (three) times daily with meals. 15 mL 11     No current facility-administered medications on file prior to visit.

## 2025-05-01 ENCOUNTER — TELEPHONE (OUTPATIENT)
Dept: FAMILY MEDICINE | Facility: CLINIC | Age: 70
End: 2025-05-01
Payer: MEDICARE

## 2025-05-01 DIAGNOSIS — N52.9 ERECTILE DYSFUNCTION, UNSPECIFIED ERECTILE DYSFUNCTION TYPE: ICD-10-CM

## 2025-05-01 RX ORDER — NIFEDIPINE 30 MG/1
30 TABLET, EXTENDED RELEASE ORAL
Qty: 90 TABLET | Refills: 3 | Status: SHIPPED | OUTPATIENT
Start: 2025-05-01

## 2025-05-01 RX ORDER — SILDENAFIL 50 MG/1
50 TABLET, FILM COATED ORAL
Qty: 30 TABLET | Refills: 0 | Status: SHIPPED | OUTPATIENT
Start: 2025-05-01

## 2025-05-06 ENCOUNTER — HOSPITAL ENCOUNTER (OUTPATIENT)
Dept: RADIOLOGY | Facility: HOSPITAL | Age: 70
Discharge: HOME | End: 2025-05-06
Attending: FAMILY MEDICINE
Payer: MEDICARE

## 2025-05-06 ENCOUNTER — CLINICAL SUPPORT (OUTPATIENT)
Dept: LAB | Facility: HOSPITAL | Age: 70
End: 2025-05-06
Attending: FAMILY MEDICINE
Payer: MEDICARE

## 2025-05-06 ENCOUNTER — TELEPHONE (OUTPATIENT)
Dept: NEUROSURGERY | Facility: CLINIC | Age: 70
End: 2025-05-06
Payer: MEDICARE

## 2025-05-06 ENCOUNTER — OFFICE VISIT (OUTPATIENT)
Dept: FAMILY MEDICINE | Facility: CLINIC | Age: 70
End: 2025-05-06
Payer: MEDICARE

## 2025-05-06 VITALS
SYSTOLIC BLOOD PRESSURE: 138 MMHG | HEART RATE: 105 BPM | HEIGHT: 74 IN | OXYGEN SATURATION: 95 % | WEIGHT: 260.38 LBS | TEMPERATURE: 98 F | DIASTOLIC BLOOD PRESSURE: 74 MMHG | BODY MASS INDEX: 33.42 KG/M2

## 2025-05-06 DIAGNOSIS — N18.30 STAGE 3 CHRONIC KIDNEY DISEASE, UNSPECIFIED WHETHER STAGE 3A OR 3B CKD: ICD-10-CM

## 2025-05-06 DIAGNOSIS — I73.9 PAD (PERIPHERAL ARTERY DISEASE): ICD-10-CM

## 2025-05-06 DIAGNOSIS — E78.5 HYPERLIPIDEMIA ASSOCIATED WITH TYPE 2 DIABETES MELLITUS: ICD-10-CM

## 2025-05-06 DIAGNOSIS — D69.6 THROMBOCYTOPENIA: ICD-10-CM

## 2025-05-06 DIAGNOSIS — R29.818 NEUROGENIC CLAUDICATION: ICD-10-CM

## 2025-05-06 DIAGNOSIS — I15.2 HYPERTENSION ASSOCIATED WITH DIABETES: ICD-10-CM

## 2025-05-06 DIAGNOSIS — Z79.4 TYPE 2 DIABETES MELLITUS WITH DIABETIC POLYNEUROPATHY, WITH LONG-TERM CURRENT USE OF INSULIN: ICD-10-CM

## 2025-05-06 DIAGNOSIS — G89.4 CHRONIC PAIN SYNDROME: ICD-10-CM

## 2025-05-06 DIAGNOSIS — E03.9 ACQUIRED HYPOTHYROIDISM: ICD-10-CM

## 2025-05-06 DIAGNOSIS — M48.07 SPINAL STENOSIS, LUMBOSACRAL REGION: ICD-10-CM

## 2025-05-06 DIAGNOSIS — Z94.4 S/P LIVER TRANSPLANT: Chronic | ICD-10-CM

## 2025-05-06 DIAGNOSIS — E11.42 TYPE 2 DIABETES MELLITUS WITH DIABETIC POLYNEUROPATHY, WITH LONG-TERM CURRENT USE OF INSULIN: Chronic | ICD-10-CM

## 2025-05-06 DIAGNOSIS — E11.59 HYPERTENSION ASSOCIATED WITH DIABETES: ICD-10-CM

## 2025-05-06 DIAGNOSIS — M48.02 SPINAL STENOSIS, CERVICAL REGION: ICD-10-CM

## 2025-05-06 DIAGNOSIS — E11.69 HYPERLIPIDEMIA ASSOCIATED WITH TYPE 2 DIABETES MELLITUS: ICD-10-CM

## 2025-05-06 DIAGNOSIS — Z01.818 PRE-OP TESTING: ICD-10-CM

## 2025-05-06 DIAGNOSIS — E11.42 TYPE 2 DIABETES MELLITUS WITH DIABETIC POLYNEUROPATHY, WITH LONG-TERM CURRENT USE OF INSULIN: ICD-10-CM

## 2025-05-06 DIAGNOSIS — Z79.4 TYPE 2 DIABETES MELLITUS WITH DIABETIC POLYNEUROPATHY, WITH LONG-TERM CURRENT USE OF INSULIN: Chronic | ICD-10-CM

## 2025-05-06 DIAGNOSIS — I50.9 CONGESTIVE HEART FAILURE, UNSPECIFIED HF CHRONICITY, UNSPECIFIED HEART FAILURE TYPE: ICD-10-CM

## 2025-05-06 DIAGNOSIS — Z01.818 PRE-OP EXAMINATION: Primary | ICD-10-CM

## 2025-05-06 DIAGNOSIS — E03.9 ACQUIRED HYPOTHYROIDISM: Chronic | ICD-10-CM

## 2025-05-06 DIAGNOSIS — I10 ESSENTIAL HYPERTENSION: ICD-10-CM

## 2025-05-06 PROCEDURE — 93005 ELECTROCARDIOGRAM TRACING: CPT

## 2025-05-06 PROCEDURE — 3075F SYST BP GE 130 - 139MM HG: CPT | Mod: CPTII,S$GLB,, | Performed by: FAMILY MEDICINE

## 2025-05-06 PROCEDURE — 3066F NEPHROPATHY DOC TX: CPT | Mod: CPTII,S$GLB,, | Performed by: FAMILY MEDICINE

## 2025-05-06 PROCEDURE — 71046 X-RAY EXAM CHEST 2 VIEWS: CPT | Mod: TC,FY

## 2025-05-06 PROCEDURE — 1125F AMNT PAIN NOTED PAIN PRSNT: CPT | Mod: CPTII,S$GLB,, | Performed by: FAMILY MEDICINE

## 2025-05-06 PROCEDURE — 3044F HG A1C LEVEL LT 7.0%: CPT | Mod: CPTII,S$GLB,, | Performed by: FAMILY MEDICINE

## 2025-05-06 PROCEDURE — 93010 ELECTROCARDIOGRAM REPORT: CPT | Mod: ,,, | Performed by: INTERNAL MEDICINE

## 2025-05-06 PROCEDURE — 3078F DIAST BP <80 MM HG: CPT | Mod: CPTII,S$GLB,, | Performed by: FAMILY MEDICINE

## 2025-05-06 PROCEDURE — 99999 PR PBB SHADOW E&M-EST. PATIENT-LVL V: CPT | Mod: PBBFAC,,, | Performed by: FAMILY MEDICINE

## 2025-05-06 PROCEDURE — 3288F FALL RISK ASSESSMENT DOCD: CPT | Mod: CPTII,S$GLB,, | Performed by: FAMILY MEDICINE

## 2025-05-06 PROCEDURE — 3008F BODY MASS INDEX DOCD: CPT | Mod: CPTII,S$GLB,, | Performed by: FAMILY MEDICINE

## 2025-05-06 PROCEDURE — 71046 X-RAY EXAM CHEST 2 VIEWS: CPT | Mod: 26,,, | Performed by: RADIOLOGY

## 2025-05-06 PROCEDURE — 99214 OFFICE O/P EST MOD 30 MIN: CPT | Mod: S$GLB,,, | Performed by: FAMILY MEDICINE

## 2025-05-06 PROCEDURE — 3060F POS MICROALBUMINURIA REV: CPT | Mod: CPTII,S$GLB,, | Performed by: FAMILY MEDICINE

## 2025-05-06 PROCEDURE — 1101F PT FALLS ASSESS-DOCD LE1/YR: CPT | Mod: CPTII,S$GLB,, | Performed by: FAMILY MEDICINE

## 2025-05-06 PROCEDURE — 1159F MED LIST DOCD IN RCRD: CPT | Mod: CPTII,S$GLB,, | Performed by: FAMILY MEDICINE

## 2025-05-06 NOTE — PROGRESS NOTES
(Portions of this note were dictated using voice recognition software and may contain dictation related errors in spelling/grammar/syntax not found on text review)    CC:   Chief Complaint   Patient presents with    Pre-op Exam       HPI: 69 y.o. male, pt of Dr Lopez, presented for pre op examination. He has medical history significant for CKD stage 3, status post liver transplant, chronic pain syndrome, chronic low back pain, lumbar radiculopathy, erectile dysfunction, pulmonary emphysema, peptic ulcer disease, generalized anxiety disorder, congestive heart failure.     He is scheduled to have minimally invasive lumbar spine OLIF arthrodesis with Dr Payne on 5/15.    DM: Controlled, last hba1c was 6.9, compliant with mounjaro 2.5 mg weekly as well insulin glargine and lispro    HTN: BP normal, takes lisinopril 20 mg, procardia 30 mg and Toprol XL    He has hx of CHF, reports leg swelling from time to time for which he take lasix as needed    He denies any chest pain/tightness, shortness of breath, angina or angina equivalent symptoms. He reports having severe buttock/back pain, walks with the cane and would like to have surgery as soon as possible    He is a former smoker.    Past Medical History:   Diagnosis Date    Acute congestive heart failure 5/12/2023    Anemia     Anxiety     Cataract     Chronic pain syndrome 07/13/2011    CKD (chronic kidney disease), stage III     Coronary artery disease involving native coronary artery of native heart with angina pectoris 8/24/2023    Diabetes mellitus type II, uncontrolled     Discitis of lumbosacral region 01/16/2015    ED (erectile dysfunction)     Encounter for blood transfusion     Genital herpes     Gout, arthritis     History of alcohol abuse     History of hepatitis C, s/p successful Rx w/ SVR24 (cure) - 5/2018 08/23/2011    Completed 24wks Epclusa + RBV w/SVR12 - 2/2018  -     History of positive PPD, treatment status unknown     Pulmonary granulomas, negative  sputum cultures for AFB and indeterminate quantferon test    History of substance abuse     Hypertension     Hypothyroidism     Liver replaced by transplant 08/23/2011    DATE: 12/16/2013  LIVER BIOPSY : REASON:  hep C staging  PATHOLOGY COMMITTEE NOTE/PLAN: :grade  1 / stage 1        Pancreatitis 2016    Peptic ulcer disease     Pulmonary emphysema, unspecified emphysema type 5/26/2023       Past Surgical History:   Procedure Laterality Date    ANTERIOR CERVICAL DISCECTOMY W/ FUSION N/A 8/22/2022    Procedure: Procedure: ACDF 4-7 LOS: 4.0 Anesthesia: General Blood: Type & Screen Radiology: C-Arm Microscope: ------- SNS: EMG, SEP, MEP Brace: [place] Bed: Regular Bed, Shoulder Strap Headrest:------ Position: Supine Equipment: Depuy;  Surgeon: Titi Christian MD;  Location: [facility] OR;  Service: Neurosurgery;  Laterality: N/A;  Depuy  confirmed CW 8/19  Neuro monitoring confirmed CW 8/19    CARPAL TUNNEL RELEASE Left 10/29/2021    Procedure: RELEASE, CARPAL TUNNEL;  Surgeon: Jameson Alejo Jr., MD;  Location: [facility] OR;  Service: Orthopedics;  Laterality: Left;    CHOLECYSTECTOMY      CORONARY ANGIOGRAPHY N/A 8/2/2023    Procedure: ANGIOGRAM, CORONARY ARTERY;  Surgeon: Juan Vera MD;  Location: [facility] CATH LAB/EP;  Service: Cardiology;  Laterality: N/A;    DECOMPRESSION OF CERVICAL SPINE BY ANTERIOR APPROACH WITH FUSION  8/22/2022    Procedure: DECOMPRESSION AND FUSION, SPINE, CERVICAL, ANTERIOR APPROACH;  Surgeon: Titi Christian MD;  Location: [facility] OR;  Service: Neurosurgery;;    INJECTION OF JOINT Right 12/2/2019    Procedure: INJECTION, JOINT SI;  Surgeon: Thu Penn MD;  Location: [facility] PAIN MGT;  Service: Pain Management;  Laterality: Right;  RT SI JNT INJ    INSERTION OF SPINAL NEUROSTIMULATOR N/A 9/3/2024    Procedure: INSERTION, NEUROSTIMULATOR, SPINAL;  Surgeon: Khadar Payne MD;  Location: [facility] OR;  Service: Neurosurgery;  Laterality: N/A;  Procedure:C1-2 generator placement  Length of procedure:1.5  hours  LOS: 0-1 nights  Anesthesia:General  Blood:Type and screen  Radiology:C-arm  Bed:Regular Bed  Headrest:[place]  Position:Prone  Equipment:Kassandra Bob    LAMINECTOMY, SPINE, FOR NEUROSTIMULATOR ELECTRODE INSERTION N/A 8/29/2024    Procedure: LAMINECTOMY, SPINE, FOR NEUROSTIMULATOR ELECTRODE INSERTION;  Surgeon: Khadar Payne MD;  Location: [facility] OR;  Service: Neurosurgery;  Laterality: N/A;  Procedure: C1-2 laminectomy for paddle lead placement for spinal cord stimulator trial, extension   Length of procedure: 2 hours  LOS: 0 nights  Anesthesia:General  Blood:Type and screen  Radiology:C-arm  SNS:EMG,SEP,MEP  Position:Pro    LEFT HEART CATHETERIZATION Left 8/2/2023    Procedure: Left heart cath;  Surgeon: Juan Vera MD;  Location: [facility] CATH LAB/EP;  Service: Cardiology;  Laterality: Left;    LIVER TRANSPLANT  06/2010    SPINE SURGERY      TRANSFORAMINAL EPIDURAL INJECTION OF STEROID Left 5/29/2023    Procedure: INJECTION, STEROID, EPIDURAL, TRANSFORAMINAL APPROACH,  LEFT C7-T1 DIRECT REF;  Surgeon: Thu Penn MD;  Location: [facility] PAIN MGT;  Service: Pain Management;  Laterality: Left;  4/3 MD ILL/PT WILL C/B       Family History   Problem Relation Name Age of Onset    Cancer Mother      Diabetes Mother      Heart disease Mother      Diabetes Sister      Cancer Maternal Uncle  82        colon CA    Drug abuse Daughter      Melanoma Neg Hx      Psoriasis Neg Hx      Lupus Neg Hx      Eczema Neg Hx      Acne Neg Hx         Social History[1]    Lab Results   Component Value Date    WBC 7.11 05/06/2025    HGB 11.9 (L) 05/06/2025    HCT 38.0 (L) 05/06/2025    MCV 91 05/06/2025     (L) 05/06/2025    CHOL 174 03/12/2025    TRIG 89 03/12/2025    HDL 50 03/12/2025    ALT 22 05/06/2025    AST 21 05/06/2025    BILITOT 0.3 05/06/2025    ALKPHOS 62 05/06/2025     05/06/2025    K 3.5 05/06/2025     05/06/2025    CREATININE 1.3 05/06/2025    ESTGFRAFRICA >60.0 07/25/2022    EGFRNONAA 52.7  (A) 07/25/2022    CALCIUM 9.2 05/06/2025    ALBUMIN 3.8 05/06/2025    BUN 16 05/06/2025    CO2 25 05/06/2025    TSH 1.757 03/12/2025    PSA 0.20 03/12/2025    PSADIAG 0.28 10/09/2017    INR 1.0 05/06/2025    HGBA1C 6.9 (H) 03/12/2025    MICALBCREAT 135.6 (H) 03/12/2025    LDLCALC 106.2 03/12/2025     (H) 05/06/2025    [id number] 30 10/11/2021             Vital signs reviewed  PE:   APPEARANCE: In no acute distress.    HEAD: Normocephalic, atraumatic.  EYES: EOMI.  Conjunctivae noninjected.  NOSE: Mucosa pink. Airway clear.  NECK: Supple with no cervical lymphadenopathy.    CHEST: Good inspiratory effort. Lungs clear to auscultation with no wheezes or crackles.  CARDIOVASCULAR: Normal S1, S2. No rubs, murmurs, or gallops.  ABDOMEN: Bowel sounds normal. Not distended. Soft. No tenderness or masses. No organomegaly.  EXTREMITIES: No edema, cyanosis, or clubbing.    Review of Systems   Constitutional:  Negative for chills, fatigue and fever.   HENT: Negative.     Respiratory:  Negative for cough, shortness of breath and wheezing.    Cardiovascular:  Negative for chest pain, palpitations and leg swelling.   Gastrointestinal: Negative.    Genitourinary: Negative.    Musculoskeletal: Negative.    Neurological: Negative.    Psychiatric/Behavioral: Negative.     All other systems reviewed and are negative.      IMPRESSION  1. Pre-op examination    2. Hypertension associated with diabetes    3. Hyperlipidemia associated with type 2 diabetes mellitus    4. Essential hypertension    5. Type 2 diabetes mellitus with diabetic polyneuropathy, with long-term current use of insulin    6. S/P liver transplant    7. Acquired hypothyroidism            PLAN      1. Pre-op examination (Primary)    ACC/AHA 2007 Guidelines for clearance    Step 1: No need for emergency non cardiac surgery  Step 2: No active cardiac conditions  Step 3: No low risk surgery  Step4: Yes - Functional capacity greater than or equal to 4 METs without  symptoms - YES - Class IIa, BEATRICE B - Proceed with surgery    Labs, CXR and EKG ordered and reviewed    Labs stable, CXR normal    EKG showed sinus tachycardia and PAC s which were not present on previous EKG    Referral to cardiology placed for pre op clearance      He is intermediate to high risk for both surgery and anesthesia and his medical conditions are optimized with pending cardiology clearance        2. Hypertension associated with diabetes    Stable    Continue current medications      3. Hyperlipidemia associated with type 2 diabetes mellitus        4. Essential hypertension    Stable      5. Type 2 diabetes mellitus with diabetic polyneuropathy, with long-term current use of insulin    Stable    Continue current medications      6. S/P liver transplant      7. Acquired hypothyroidism           SCREENINGS        Age/demographic appropriate health maintenance:    Health Maintenance Due   Topic Date Due    Complete Opioid Risk Tool  Never done    RSV Vaccine (Age 60+ and Pregnant patients) (1 - Risk 60-74 years 1-dose series) Never done    Diabetic Eye Exam  01/13/2024    Pneumococcal Vaccines (Age 50+) (4 of 4 - PCV20 or PCV21) 07/08/2024    Foot Exam  07/31/2024    Urine Drug Screen  08/02/2024    COVID-19 Vaccine (5 - 2024-25 season) 04/21/2025    Colorectal Cancer Screening  07/10/2025           Spent adequate time in obtaining history and explaining differentials     35 minutes spent during this visit of which greater than 50% devoted to face-face counseling and coordination of care regarding diagnosis and management plan       Jayson Winters   5/6/2025         [1]   Social History  Tobacco Use    Smoking status: Former     Passive exposure: Never    Smokeless tobacco: Never   Substance Use Topics    Alcohol use: No     Comment: over 5 years ago, none currently    Drug use: Not Currently     Comment: Former cocaine use

## 2025-05-07 LAB
OHS QRS DURATION: 90 MS
OHS QTC CALCULATION: 459 MS

## 2025-05-09 ENCOUNTER — TELEPHONE (OUTPATIENT)
Dept: FAMILY MEDICINE | Facility: CLINIC | Age: 70
End: 2025-05-09
Payer: MEDICARE

## 2025-05-09 ENCOUNTER — PATIENT MESSAGE (OUTPATIENT)
Dept: ANESTHESIOLOGY | Facility: HOSPITAL | Age: 70
End: 2025-05-09
Payer: MEDICARE

## 2025-05-09 ENCOUNTER — HOSPITAL ENCOUNTER (OUTPATIENT)
Dept: PREADMISSION TESTING | Facility: HOSPITAL | Age: 70
Discharge: HOME | End: 2025-05-09
Attending: NURSE PRACTITIONER
Payer: MEDICARE

## 2025-05-09 ENCOUNTER — PATIENT MESSAGE (OUTPATIENT)
Dept: FAMILY MEDICINE | Facility: CLINIC | Age: 70
End: 2025-05-09
Payer: MEDICARE

## 2025-05-09 ENCOUNTER — ANESTHESIA EVENT (OUTPATIENT)
Dept: SURGERY | Facility: HOSPITAL | Age: 70
End: 2025-05-09
Payer: MEDICARE

## 2025-05-09 NOTE — ANESTHESIA PREPROCEDURE EVALUATION
"                                                                                                             05/09/2025  Vick Gonzalez is a 69 y.o., male scheduled for  ARTHRODESIS, SPINE, LUMBAR, OLIF, MINIMALLY INVASIVE and  FACETECTOMY - Procedure:L4-5 laminectomy,medial facectomy 5/15/25.    Per family medicine Dr. Winters, "He is intermediate to high risk for both surgery and anesthesia and his medical conditions are optimized with pending cardiology clearance".    Per cardiology Dr. Jain, "Patient is high risk for cardiovascular events by RCRI criteria with a history of NSTEMI 2023 and insulin-dependent diabetes.  He has a history of heart failure, but this has seemingly not been an issue since his liver transplant in 2009.  BNP is reviewed and all normal over the last few years".    Past Medical History:   Diagnosis Date    Acute congestive heart failure 5/12/2023    Anemia     Anxiety     Cataract     Chronic pain syndrome 07/13/2011    CKD (chronic kidney disease), stage III     Coronary artery disease involving native coronary artery of native heart with angina pectoris 8/24/2023    Diabetes mellitus type II, uncontrolled     Discitis of lumbosacral region 01/16/2015    ED (erectile dysfunction)     Encounter for blood transfusion     Genital herpes     Gout, arthritis     History of alcohol abuse     History of hepatitis C, s/p successful Rx w/ SVR24 (cure) - 5/2018 08/23/2011    Completed 24wks Epclusa + RBV w/SVR12 - 2/2018  -     History of positive PPD, treatment status unknown     Pulmonary granulomas, negative sputum cultures for AFB and indeterminate quantferon test    History of substance abuse     Hypertension     Hypothyroidism     Liver replaced by transplant 08/23/2011    DATE: 12/16/2013  LIVER BIOPSY : REASON:  hep C staging  PATHOLOGY COMMITTEE NOTE/PLAN: :grade  1 / stage 1        Pancreatitis 2016    Peptic ulcer disease     Pulmonary emphysema, unspecified emphysema type 5/26/2023 "     Past Surgical History:   Procedure Laterality Date    ANTERIOR CERVICAL DISCECTOMY W/ FUSION N/A 8/22/2022    Procedure: Procedure: ACDF 4-7 LOS: 4.0 Anesthesia: General Blood: Type & Screen Radiology: C-Arm Microscope: ------- SNS: EMG, SEP, MEP Brace: [place] Bed: Regular Bed, Shoulder Strap Headrest:------ Position: Supine Equipment: Depuy;  Surgeon: Titi Christian MD;  Location: [facility] OR;  Service: Neurosurgery;  Laterality: N/A;  Depuy  confirmed CW 8/19  Neuro monitoring confirmed CW 8/19    CARPAL TUNNEL RELEASE Left 10/29/2021    Procedure: RELEASE, CARPAL TUNNEL;  Surgeon: Jameson Alejo Jr., MD;  Location: [facility] OR;  Service: Orthopedics;  Laterality: Left;    CHOLECYSTECTOMY      CORONARY ANGIOGRAPHY N/A 8/2/2023    Procedure: ANGIOGRAM, CORONARY ARTERY;  Surgeon: Juan Vera MD;  Location: [facility] CATH LAB/EP;  Service: Cardiology;  Laterality: N/A;    DECOMPRESSION OF CERVICAL SPINE BY ANTERIOR APPROACH WITH FUSION  8/22/2022    Procedure: DECOMPRESSION AND FUSION, SPINE, CERVICAL, ANTERIOR APPROACH;  Surgeon: Titi Christian MD;  Location: [facility] OR;  Service: Neurosurgery;;    INJECTION OF JOINT Right 12/2/2019    Procedure: INJECTION, JOINT SI;  Surgeon: Thu Penn MD;  Location: [facility] PAIN MGT;  Service: Pain Management;  Laterality: Right;  RT SI JNT INJ    INSERTION OF SPINAL NEUROSTIMULATOR N/A 9/3/2024    Procedure: INSERTION, NEUROSTIMULATOR, SPINAL;  Surgeon: Khadar Payne MD;  Location: [facility] OR;  Service: Neurosurgery;  Laterality: N/A;  Procedure:C1-2 generator placement  Length of procedure:1.5 hours  LOS: 0-1 nights  Anesthesia:General  Blood:Type and screen  Radiology:C-arm  Bed:Regular Bed  Headrest:[place]  Position:Prone  Equipment:Kassandra Bob    LAMINECTOMY, SPINE, FOR NEUROSTIMULATOR ELECTRODE INSERTION N/A 8/29/2024    Procedure: LAMINECTOMY, SPINE, FOR NEUROSTIMULATOR ELECTRODE INSERTION;  Surgeon: Khadar Payne MD;  Location: [facility] OR;  Service: Neurosurgery;   Laterality: N/A;  Procedure: C1-2 laminectomy for paddle lead placement for spinal cord stimulator trial, extension   Length of procedure: 2 hours  LOS: 0 nights  Anesthesia:General  Blood:Type and screen  Radiology:C-arm  SNS:EMG,SEP,MEP  Position:Pro    LEFT HEART CATHETERIZATION Left 8/2/2023    Procedure: Left heart cath;  Surgeon: Juan Vera MD;  Location: [facility] CATH LAB/EP;  Service: Cardiology;  Laterality: Left;    LIVER TRANSPLANT  06/2010    SPINE SURGERY      TRANSFORAMINAL EPIDURAL INJECTION OF STEROID Left 5/29/2023    Procedure: INJECTION, STEROID, EPIDURAL, TRANSFORAMINAL APPROACH,  LEFT C7-T1 DIRECT REF;  Surgeon: Thu Penn MD;  Location: [facility] PAIN MGT;  Service: Pain Management;  Laterality: Left;  4/3 MD ILL/PT WILL C/B     Review of patient's allergies indicates:  No Known Allergies    Current Outpatient Medications   Medication Instructions    allopurinoL (ZYLOPRIM) 100 mg, Oral, 2 times daily    aluminum-magnesium hydroxide-simethicone (MAALOX) 200-200-20 mg/5 mL Susp 30 mLs, Oral, Before meals & nightly    aspirin (ECOTRIN) 81 mg, Daily    atorvastatin (LIPITOR) 40 mg, Oral, Daily    blood-glucose meter Misc 1 Device, Misc.(Non-Drug; Combo Route), 3 times daily    blood-glucose meter,continuous (DEXCOM G7 ) Misc Use as directed.    blood-glucose sensor (DEXCOM G7 SENSOR) Viviane Use as directed.    blood-glucose transmitter (DEXCOM G6 TRANSMITTER) Viviane Use as directed    calcium carbonate-vitamin D3 (CALCIUM 600 WITH VITAMIN D3) 600 mg(1,500mg) -400 unit Chew 1 tablet, Daily    cyanocobalamin (VITAMIN B-12) 100 mcg, Daily    diphenhydrAMINE (BENADRYL) 25 mg, Daily PRN    furosemide (LASIX) 40 mg, Oral, Daily PRN    gabapentin (NEURONTIN) 800 mg, Oral, 3 times daily    HumaLOG KwikPen Insulin 20 Units, Subcutaneous, 3 times daily with meals    lancets 30 gauge Misc 1 lancet , Misc.(Non-Drug; Combo Route), Before meals & nightly    levothyroxine (SYNTHROID) 88 MCG tablet TAKE 1  "TABLET BY MOUTH EVERY DAY    lisinopriL (PRINIVIL,ZESTRIL) 20 mg, Oral    methocarbamoL (ROBAXIN) 750 mg, Oral, 3 times daily PRN    metoprolol succinate (TOPROL-XL) 200 mg, Oral, Daily    MOUNJARO 2.5 mg, Subcutaneous, Every 7 days    multivit with min-folic acid (MEN'S MULTIVITAMIN GUMMIES) 200 mcg Chew 1 tablet, Daily    NIFEdipine (PROCARDIA-XL) 30 mg, Oral    NOVOFINE PLUS 32 gauge x 1/6" Ndle No dose, route, or frequency recorded.    oxyCODONE-acetaminophen (PERCOCET)  mg per tablet 1 tablet, Oral, Every 8 hours PRN, Quantity greater than 7 day supply medically necessary    papaverine 30 mg/mL injection Tri-Mix - PGE (alprostadil) 10mcg, papavarine 30mg, phentolamine 1mg; dispense 5mL vial, typical starting is 0.2 mL which is equal to 20 units    polyethylene glycol (MIRALAX) 17 g, Oral, Daily    sildenafiL (VIAGRA) 100 mg, Oral, Daily PRN    sildenafiL (VIAGRA) 50 mg, Oral    tacrolimus (PROGRAF) 1 MG Cap Take 2 capsules (2 mg total) by mouth every morning AND 1 capsule (1 mg total) every evening.    TOUJEO MAX U-300 SOLOSTAR 40 Units, Subcutaneous, Daily    traZODone (DESYREL) 50 mg, Oral, Nightly    TRUE METRIX GLUCOSE TEST STRIP Strp USE 3 TIMES DAILY TO TEST BLOOD GLUCOSE LEVEL       Pre-op Assessment    I have reviewed the Patient Summary Reports.     I have reviewed the Nursing Notes. I have reviewed the NPO Status.   I have reviewed the Medications.     Review of Systems  Anesthesia Hx:  No problems with previous Anesthesia   History of prior surgery of interest to airway management or planning: liver transplant.           Denies Personal Hx of Anesthesia complications.                    Social:  Non-Smoker, No Alcohol Use       Cardiovascular:  Exercise tolerance: good   Denies Pacemaker. Hypertension   CAD       Denies Angina. CHF   PVD hyperlipidemia                               Pulmonary:   COPD, mild    Denies Shortness of breath.   Denies Sleep Apnea.                Renal/:  Chronic " Renal Disease, CKD  BPH              Hepatic/GI:   PUD,   Liver Disease, Hepatitis, C  Taking GLP-1 Agonists Instructed to Hold for 7 Days           Musculoskeletal:         Spine Disorders: lumbar            Neurological:  Denies TIA.  Denies CVA. Neuromuscular Disease,   Denies Seizures.          Chronic Pain Syndrome                         Endocrine:  Diabetes (a1c 6.9 3/12/25), well controlled, type 2 Hypothyroidism          Psych:   anxiety                 Physical Exam  General: Cooperative and Oriented    Airway:  Mallampati: III   Neck ROM: Normal ROM    Dental:  Dentures        Anesthesia Plan  Type of Anesthesia, risks & benefits discussed:    Anesthesia Type: Gen ETT  Intra-op Monitoring Plan: Standard ASA Monitors  Post Op Pain Control Plan: multimodal analgesia and IV/PO Opioids PRN  Induction:  IV  Airway Plan: Video and Direct, Post-Induction  Informed Consent: Informed consent signed with the Patient and all parties understand the risks and agree with anesthesia plan.  All questions answered.   ASA Score: 3  Day of Surgery Review of History & Physical: H&P Update referred to the surgeon/provider.  Anesthesia Plan Notes: Anesthesia consent to be signed prior to surgery 5/15/25      Ready For Surgery From Anesthesia Perspective.     .

## 2025-05-09 NOTE — TELEPHONE ENCOUNTER
Called pt about EKG changes and need to see cardiology for pre op, he did not answer and VM is full, referral placed and asked referral coordinator to schedule sooner appointment.      Dr Winters

## 2025-05-12 ENCOUNTER — OFFICE VISIT (OUTPATIENT)
Dept: CARDIOLOGY | Facility: CLINIC | Age: 70
End: 2025-05-12
Payer: MEDICARE

## 2025-05-12 DIAGNOSIS — Z94.4 S/P LIVER TRANSPLANT: Chronic | ICD-10-CM

## 2025-05-12 DIAGNOSIS — E11.69 HYPERLIPIDEMIA ASSOCIATED WITH TYPE 2 DIABETES MELLITUS: ICD-10-CM

## 2025-05-12 DIAGNOSIS — Z86.19 HISTORY OF HEPATITIS C: ICD-10-CM

## 2025-05-12 DIAGNOSIS — E11.42 TYPE 2 DIABETES MELLITUS WITH DIABETIC POLYNEUROPATHY, WITH LONG-TERM CURRENT USE OF INSULIN: Chronic | ICD-10-CM

## 2025-05-12 DIAGNOSIS — E78.5 HYPERLIPIDEMIA ASSOCIATED WITH TYPE 2 DIABETES MELLITUS: ICD-10-CM

## 2025-05-12 DIAGNOSIS — Z01.818 PRE-OP EXAMINATION: Primary | ICD-10-CM

## 2025-05-12 DIAGNOSIS — I10 ESSENTIAL HYPERTENSION: ICD-10-CM

## 2025-05-12 DIAGNOSIS — N18.31 STAGE 3A CHRONIC KIDNEY DISEASE: Chronic | ICD-10-CM

## 2025-05-12 DIAGNOSIS — I25.10 CORONARY ARTERY DISEASE INVOLVING NATIVE CORONARY ARTERY OF NATIVE HEART WITHOUT ANGINA PECTORIS: ICD-10-CM

## 2025-05-12 DIAGNOSIS — Z79.4 TYPE 2 DIABETES MELLITUS WITH DIABETIC POLYNEUROPATHY, WITH LONG-TERM CURRENT USE OF INSULIN: Chronic | ICD-10-CM

## 2025-05-12 DIAGNOSIS — I73.9 PAD (PERIPHERAL ARTERY DISEASE): Chronic | ICD-10-CM

## 2025-05-12 LAB
OHS QRS DURATION: 92 MS
OHS QTC CALCULATION: 423 MS

## 2025-05-12 PROCEDURE — 93000 ELECTROCARDIOGRAM COMPLETE: CPT | Mod: S$GLB,,, | Performed by: INTERNAL MEDICINE

## 2025-05-12 PROCEDURE — 1159F MED LIST DOCD IN RCRD: CPT | Mod: CPTII,S$GLB,, | Performed by: INTERNAL MEDICINE

## 2025-05-12 PROCEDURE — 99999 PR PBB SHADOW E&M-EST. PATIENT-LVL V: CPT | Mod: PBBFAC,,, | Performed by: INTERNAL MEDICINE

## 2025-05-12 PROCEDURE — 99215 OFFICE O/P EST HI 40 MIN: CPT | Mod: 25,S$GLB,, | Performed by: INTERNAL MEDICINE

## 2025-05-12 PROCEDURE — 3288F FALL RISK ASSESSMENT DOCD: CPT | Mod: CPTII,S$GLB,, | Performed by: INTERNAL MEDICINE

## 2025-05-12 PROCEDURE — 3066F NEPHROPATHY DOC TX: CPT | Mod: CPTII,S$GLB,, | Performed by: INTERNAL MEDICINE

## 2025-05-12 PROCEDURE — 3044F HG A1C LEVEL LT 7.0%: CPT | Mod: CPTII,S$GLB,, | Performed by: INTERNAL MEDICINE

## 2025-05-12 PROCEDURE — 1101F PT FALLS ASSESS-DOCD LE1/YR: CPT | Mod: CPTII,S$GLB,, | Performed by: INTERNAL MEDICINE

## 2025-05-12 PROCEDURE — 3060F POS MICROALBUMINURIA REV: CPT | Mod: CPTII,S$GLB,, | Performed by: INTERNAL MEDICINE

## 2025-05-12 PROCEDURE — 1160F RVW MEDS BY RX/DR IN RCRD: CPT | Mod: CPTII,S$GLB,, | Performed by: INTERNAL MEDICINE

## 2025-05-12 NOTE — PROGRESS NOTES
Patient ID: Vick Gonzalez is a 69 y.o. male.    History of Present Illness    CHIEF COMPLAINT:  - Patient presents for cardiac clearance for an upcoming neurosurgical intervention on the spine.    HPI:  Patient, a 69-year-old male, is scheduled for neurosurgical intervention on his spine due to pain radiating from his back down each leg. He describes pain as significant with significant discomfort in the gluteal region and difficulty with movement.    From a cardiac standpoint, he reports feeling well. He has a history of fluid retention, for which he takes a diuretic as needed, typically every 1 to 3 months when he notices mild swelling.  He does not seem to have an issue with heart failure since liver transplant.  He has no issues with chest pain or dyspnea on exertion.    He monitors BP at home, reporting average readings of 140 mmHg for systolic pressure, occasionally seeing numbers as high as 170/60-70 mmHg, and sometimes as low as 130 mmHg.    MEDICAL HISTORY:  - Nstemi, type 2  - CAD medically managed  - Heart failure with preserved EF  - Alcoholic liver disease  - HCV  - Diabetes  - HTN  - PAD  - CKD3  - Hypothyroidism    SURGICAL HISTORY:  - Liver transplant: 2009, for alcoholic liver disease  - Upcoming spinal surgery: Scheduled for Thursday, to address back pain and radiculopathy    MEDICATIONS:  - Aspirin 81 mg daily (stopped recently for upcoming surgery)  - Atorvastatin 40 mg daily  - Metoprolol 200 mg daily  - Nifedipine 30 mg daily  - Furosemide every 1-3 months PRN for swelling  - Insulin for diabetes    SOCIAL HISTORY:  - Past illicit drug use         Physical Exam      Vitals: Pulse: 71. Blood pressure: 160/90.    Cardiovascular: Regular rate and rhythm with normal S1 and S2. No murmurs. No JVD.  Extremities: No significant lower extremity edema.  Lungs: All normal.    LABS:  - Hemoglobin: 05/2025, 11.9  - MCV: 05/2025, 91  - Platelets: 05/2025, 138  - Creatinine: 05/2025, 1.3  - BUN: 05/2025,  16  - GFR: 05/2025, 60  - Albumin: 05/2025, 3.8  - Total Cholesterol: 2025, 174  - HDL: 2025, 50  - LDL: 2025, 106  - Triglycerides: 2025, 89  - Troponin: 2024, normal  - BNP: 2024, normal  - A1C: 2025, 6.9  - TSH: 2025, normal    TEST RESULTS  (IMAGING REVIEWED DURING  CLINIC VISIT):  - Echo 2023: Normal LV size and function, EF 55-60%, grade 1 diastology, no significant valve disease, CVP 3  - Cath 2023: Patent LM, 60% mid LAD, patent LCx with 80% ostial OM1, 40% mid RCA, LVEDP 10, tortuous vessels  - ECG 05/2025: SR, no Q waves or ST changes         Assessment & Plan    Z94.4 S/P liver transplant  N18.31 Stage 3a chronic kidney disease  Z01.818 Pre-op exam  I25.10 Coronary artery disease involving native coronary artery of native heart without angina pectoris  I10 Essential hypertension  E11.69, E78.5 Hyperlipidemia associated with type 2 diabetes mellitus  I73.9 PAD (peripheral artery disease)  E11.42, Z79.4 Type 2 diabetes mellitus with diabetic polyneuropathy, with long-term current use of insulin  Z86.19 History of hepatitis C    PRE-OP EXAM:  - Conducted pre-op cardiovascular evaluation for neurosurgical intervention.  - Patient is high risk for cardiovascular events by RCRI criteria with a history of NSTEMI 2023 and insulin-dependent diabetes.  He has a history of heart failure, but this has seemingly not been an issue since his liver transplant in 2009.  BNP is reviewed and all normal over the last few years.  - no cardiovascular symptoms at this time.  - EKG and cardiac exam normal today.  - no preop testing required prior to surgery.  - Hold aspirin 81 mg daily 7 days before surgery.  - Restart aspirin after surgery.    CORONARY ARTERY DISEASE INVOLVING NATIVE CORONARY ARTERY OF NATIVE HEART WITHOUT ANGINA PECTORIS:  - asymptomatic CAD.  Cardiac catheterization in 2023 reviewed.  Focus on risk factor modification.  - Continue atorvastatin 40 mg daily, emphasizing importance of daily adherence.  LDL has  been at goal previously.  - Inconsistent adherence to statin therapy noted.    ESSENTIAL HYPERTENSION:  - BP elevated at home readings.  - Patient to check blood pressure at home regularly.  - Follow up with primary care physician after surgery for potential adjustment of blood pressure medications.  - Continue metoprolol 200 mg daily.  - Continue nifedipine 30 mg daily.  - consider additional therapy, if blood pressures remain above goal.    HYPERLIPIDEMIA ASSOCIATED WITH TYPE 2 DIABETES MELLITUS:  - Continue atorvastatin 40 mg daily, emphasizing importance of daily adherence.  - Inconsistent adherence to statin therapy noted.    PAD (PERIPHERAL ARTERY DISEASE):  - Hold aspirin 81 mg daily 7 days before surgery.  - Restart aspirin after surgery.   - no claudication at this time.  - risk factor modification.        This note was generated with the assistance of ambient listening technology. Verbal consent was obtained by the patient and accompanying visitor(s) for the recording of patient appointment to facilitate this note. I attest to having reviewed and edited the generated note for accuracy, though some syntax or spelling errors may persist. Please contact the author of this note for any clarification.    The total time spent today in care of this established patient was 40 minutes.  Extensive review of old records.

## 2025-05-13 ENCOUNTER — TELEPHONE (OUTPATIENT)
Dept: TRANSPLANT | Facility: CLINIC | Age: 70
End: 2025-05-13
Payer: MEDICARE

## 2025-05-15 ENCOUNTER — HOSPITAL ENCOUNTER (INPATIENT)
Facility: HOSPITAL | Age: 70
LOS: 8 days | Discharge: NURSING, REHAB OR LONG-TERM CARE | DRG: 451 | End: 2025-05-23
Attending: NEUROLOGICAL SURGERY | Admitting: NEUROLOGICAL SURGERY
Payer: MEDICARE

## 2025-05-15 ENCOUNTER — ANESTHESIA (OUTPATIENT)
Dept: SURGERY | Facility: HOSPITAL | Age: 70
End: 2025-05-15
Payer: MEDICARE

## 2025-05-15 DIAGNOSIS — N18.30 STAGE 3 CHRONIC KIDNEY DISEASE, UNSPECIFIED WHETHER STAGE 3A OR 3B CKD: ICD-10-CM

## 2025-05-15 DIAGNOSIS — E11.42 TYPE 2 DIABETES MELLITUS WITH DIABETIC POLYNEUROPATHY, WITH LONG-TERM CURRENT USE OF INSULIN: ICD-10-CM

## 2025-05-15 DIAGNOSIS — Z98.1 ADJACENT SEGMENT DISEASE OF LUMBAR SPINE WITH HISTORY OF FUSION PROCEDURE: Primary | ICD-10-CM

## 2025-05-15 DIAGNOSIS — G89.4 CHRONIC PAIN SYNDROME: ICD-10-CM

## 2025-05-15 DIAGNOSIS — M51.369 ADJACENT SEGMENT DISEASE OF LUMBAR SPINE WITH HISTORY OF FUSION PROCEDURE: Primary | ICD-10-CM

## 2025-05-15 DIAGNOSIS — M48.062 SPINAL STENOSIS OF LUMBAR REGION WITH NEUROGENIC CLAUDICATION: ICD-10-CM

## 2025-05-15 DIAGNOSIS — Z79.4 TYPE 2 DIABETES MELLITUS WITH DIABETIC POLYNEUROPATHY, WITH LONG-TERM CURRENT USE OF INSULIN: ICD-10-CM

## 2025-05-15 PROBLEM — E11.9 TYPE 2 DIABETES MELLITUS, WITH LONG-TERM CURRENT USE OF INSULIN: Status: ACTIVE | Noted: 2025-05-15

## 2025-05-15 LAB
INDIRECT COOMBS: NORMAL
POCT GLUCOSE: 122 MG/DL (ref 70–110)
POCT GLUCOSE: 216 MG/DL (ref 70–110)
POCT GLUCOSE: 228 MG/DL (ref 70–110)
RH BLD: NORMAL
SPECIMEN OUTDATE: NORMAL

## 2025-05-15 PROCEDURE — 0QP004Z REMOVAL OF INTERNAL FIXATION DEVICE FROM LUMBAR VERTEBRA, OPEN APPROACH: ICD-10-PCS | Performed by: NEUROLOGICAL SURGERY

## 2025-05-15 PROCEDURE — 63600175 PHARM REV CODE 636 W HCPCS: Performed by: NURSE ANESTHETIST, CERTIFIED REGISTERED

## 2025-05-15 PROCEDURE — 36415 COLL VENOUS BLD VENIPUNCTURE: CPT | Performed by: NEUROLOGICAL SURGERY

## 2025-05-15 PROCEDURE — 36000710: Performed by: NEUROLOGICAL SURGERY

## 2025-05-15 PROCEDURE — 20930 SP BONE ALGRFT MORSEL ADD-ON: CPT | Mod: ,,, | Performed by: NEUROLOGICAL SURGERY

## 2025-05-15 PROCEDURE — 71000033 HC RECOVERY, INTIAL HOUR: Performed by: NEUROLOGICAL SURGERY

## 2025-05-15 PROCEDURE — 86900 BLOOD TYPING SEROLOGIC ABO: CPT | Performed by: NEUROLOGICAL SURGERY

## 2025-05-15 PROCEDURE — 63600175 PHARM REV CODE 636 W HCPCS: Performed by: NEUROLOGICAL SURGERY

## 2025-05-15 PROCEDURE — 25000003 PHARM REV CODE 250: Performed by: NURSE ANESTHETIST, CERTIFIED REGISTERED

## 2025-05-15 PROCEDURE — 0ST20ZZ RESECTION OF LUMBAR VERTEBRAL DISC, OPEN APPROACH: ICD-10-PCS | Performed by: NEUROLOGICAL SURGERY

## 2025-05-15 PROCEDURE — 01NB3ZZ RELEASE LUMBAR NERVE, PERCUTANEOUS APPROACH: ICD-10-PCS | Performed by: NEUROLOGICAL SURGERY

## 2025-05-15 PROCEDURE — 25000003 PHARM REV CODE 250: Performed by: NEUROLOGICAL SURGERY

## 2025-05-15 PROCEDURE — 61783 SCAN PROC SPINAL: CPT | Mod: XU,,, | Performed by: NEUROLOGICAL SURGERY

## 2025-05-15 PROCEDURE — 37000008 HC ANESTHESIA 1ST 15 MINUTES: Performed by: NEUROLOGICAL SURGERY

## 2025-05-15 PROCEDURE — 0SG00A0 FUSION OF LUMBAR VERTEBRAL JOINT WITH INTERBODY FUSION DEVICE, ANTERIOR APPROACH, ANTERIOR COLUMN, OPEN APPROACH: ICD-10-PCS | Performed by: NEUROLOGICAL SURGERY

## 2025-05-15 PROCEDURE — 94799 UNLISTED PULMONARY SVC/PX: CPT

## 2025-05-15 PROCEDURE — 25000003 PHARM REV CODE 250: Performed by: STUDENT IN AN ORGANIZED HEALTH CARE EDUCATION/TRAINING PROGRAM

## 2025-05-15 PROCEDURE — 22558 ARTHRD ANT NTRBD MIN DSC LUM: CPT | Mod: 22,,, | Performed by: NEUROLOGICAL SURGERY

## 2025-05-15 PROCEDURE — 71000039 HC RECOVERY, EACH ADD'L HOUR: Performed by: NEUROLOGICAL SURGERY

## 2025-05-15 PROCEDURE — C1713 ANCHOR/SCREW BN/BN,TIS/BN: HCPCS | Performed by: NEUROLOGICAL SURGERY

## 2025-05-15 PROCEDURE — 0QH004Z INSERTION OF INTERNAL FIXATION DEVICE INTO LUMBAR VERTEBRA, OPEN APPROACH: ICD-10-PCS | Performed by: NEUROLOGICAL SURGERY

## 2025-05-15 PROCEDURE — 25000003 PHARM REV CODE 250: Performed by: NURSE PRACTITIONER

## 2025-05-15 PROCEDURE — 11000001 HC ACUTE MED/SURG PRIVATE ROOM

## 2025-05-15 PROCEDURE — 63600175 PHARM REV CODE 636 W HCPCS: Performed by: STUDENT IN AN ORGANIZED HEALTH CARE EDUCATION/TRAINING PROGRAM

## 2025-05-15 PROCEDURE — 27800903 OPTIME MED/SURG SUP & DEVICES OTHER IMPLANTS: Performed by: NEUROLOGICAL SURGERY

## 2025-05-15 PROCEDURE — 00NY3ZZ RELEASE LUMBAR SPINAL CORD, PERCUTANEOUS APPROACH: ICD-10-PCS | Performed by: NEUROLOGICAL SURGERY

## 2025-05-15 PROCEDURE — 22842 INSERT SPINE FIXATION DEVICE: CPT | Mod: ,,, | Performed by: NEUROLOGICAL SURGERY

## 2025-05-15 PROCEDURE — 0QP104Z REMOVAL OF INTERNAL FIXATION DEVICE FROM SACRUM, OPEN APPROACH: ICD-10-PCS | Performed by: NEUROLOGICAL SURGERY

## 2025-05-15 PROCEDURE — 63047 LAM FACETEC & FORAMOT LUMBAR: CPT | Mod: 51,,, | Performed by: NEUROLOGICAL SURGERY

## 2025-05-15 PROCEDURE — C1729 CATH, DRAINAGE: HCPCS | Performed by: NEUROLOGICAL SURGERY

## 2025-05-15 PROCEDURE — 37000009 HC ANESTHESIA EA ADD 15 MINS: Performed by: NEUROLOGICAL SURGERY

## 2025-05-15 PROCEDURE — 36000711: Performed by: NEUROLOGICAL SURGERY

## 2025-05-15 PROCEDURE — 22853 INSJ BIOMECHANICAL DEVICE: CPT | Mod: ,,, | Performed by: NEUROLOGICAL SURGERY

## 2025-05-15 PROCEDURE — 99900035 HC TECH TIME PER 15 MIN (STAT)

## 2025-05-15 DEVICE — SCREW SET SPINAL SINGLE INNER: Type: IMPLANTABLE DEVICE | Site: BACK | Status: FUNCTIONAL

## 2025-05-15 DEVICE — IMPLANTABLE DEVICE: Type: IMPLANTABLE DEVICE | Site: BACK | Status: FUNCTIONAL

## 2025-05-15 DEVICE — GRAFT BONE PRIME DBM HD 5CC: Type: IMPLANTABLE DEVICE | Site: BACK | Status: FUNCTIONAL

## 2025-05-15 DEVICE — SCREW SHANZ SS SD HA 4X150MM: Type: IMPLANTABLE DEVICE | Site: BACK | Status: FUNCTIONAL

## 2025-05-15 DEVICE — KIT BONE GRAFT IMPACT XXSMALL: Type: IMPLANTABLE DEVICE | Site: BACK | Status: FUNCTIONAL

## 2025-05-15 RX ORDER — HYDROMORPHONE HYDROCHLORIDE 2 MG/ML
INJECTION, SOLUTION INTRAMUSCULAR; INTRAVENOUS; SUBCUTANEOUS
Status: DISCONTINUED | OUTPATIENT
Start: 2025-05-15 | End: 2025-05-15

## 2025-05-15 RX ORDER — ROCURONIUM BROMIDE 10 MG/ML
INJECTION, SOLUTION INTRAVENOUS
Status: DISCONTINUED | OUTPATIENT
Start: 2025-05-15 | End: 2025-05-15

## 2025-05-15 RX ORDER — BISACODYL 10 MG/1
10 SUPPOSITORY RECTAL DAILY
Status: DISCONTINUED | OUTPATIENT
Start: 2025-05-16 | End: 2025-05-23 | Stop reason: HOSPADM

## 2025-05-15 RX ORDER — ALUMINUM HYDROXIDE, MAGNESIUM HYDROXIDE, AND SIMETHICONE 1200; 120; 1200 MG/30ML; MG/30ML; MG/30ML
30 SUSPENSION ORAL
Status: DISCONTINUED | OUTPATIENT
Start: 2025-05-15 | End: 2025-05-23 | Stop reason: HOSPADM

## 2025-05-15 RX ORDER — INSULIN GLARGINE 100 [IU]/ML
20 INJECTION, SOLUTION SUBCUTANEOUS DAILY
Status: DISCONTINUED | OUTPATIENT
Start: 2025-05-16 | End: 2025-05-19

## 2025-05-15 RX ORDER — METOPROLOL SUCCINATE 50 MG/1
200 TABLET, EXTENDED RELEASE ORAL DAILY
Status: DISCONTINUED | OUTPATIENT
Start: 2025-05-16 | End: 2025-05-23 | Stop reason: HOSPADM

## 2025-05-15 RX ORDER — ACETAMINOPHEN 325 MG/1
650 TABLET ORAL
Status: COMPLETED | OUTPATIENT
Start: 2025-05-15 | End: 2025-05-15

## 2025-05-15 RX ORDER — LIDOCAINE HYDROCHLORIDE 10 MG/ML
1 INJECTION, SOLUTION EPIDURAL; INFILTRATION; INTRACAUDAL; PERINEURAL ONCE
Status: DISCONTINUED | OUTPATIENT
Start: 2025-05-15 | End: 2025-05-15 | Stop reason: HOSPADM

## 2025-05-15 RX ORDER — LIDOCAINE HYDROCHLORIDE 20 MG/ML
INJECTION INTRAVENOUS
Status: DISCONTINUED | OUTPATIENT
Start: 2025-05-15 | End: 2025-05-15

## 2025-05-15 RX ORDER — OXYCODONE HYDROCHLORIDE 10 MG/1
10 TABLET ORAL ONCE
Refills: 0 | Status: COMPLETED | OUTPATIENT
Start: 2025-05-15 | End: 2025-05-15

## 2025-05-15 RX ORDER — ALLOPURINOL 100 MG/1
100 TABLET ORAL 2 TIMES DAILY
Status: DISCONTINUED | OUTPATIENT
Start: 2025-05-15 | End: 2025-05-23 | Stop reason: HOSPADM

## 2025-05-15 RX ORDER — POLYETHYLENE GLYCOL 3350 17 G/17G
17 POWDER, FOR SOLUTION ORAL DAILY
Status: DISCONTINUED | OUTPATIENT
Start: 2025-05-16 | End: 2025-05-23 | Stop reason: HOSPADM

## 2025-05-15 RX ORDER — PROPOFOL 10 MG/ML
VIAL (ML) INTRAVENOUS
Status: DISCONTINUED | OUTPATIENT
Start: 2025-05-15 | End: 2025-05-15

## 2025-05-15 RX ORDER — TACROLIMUS 1 MG/1
1 CAPSULE ORAL EVERY EVENING
Status: DISCONTINUED | OUTPATIENT
Start: 2025-05-15 | End: 2025-05-23 | Stop reason: HOSPADM

## 2025-05-15 RX ORDER — METHOCARBAMOL 750 MG/1
750 TABLET, FILM COATED ORAL 3 TIMES DAILY
Status: DISCONTINUED | OUTPATIENT
Start: 2025-05-15 | End: 2025-05-21

## 2025-05-15 RX ORDER — CEFAZOLIN 2 G/1
2 INJECTION, POWDER, FOR SOLUTION INTRAMUSCULAR; INTRAVENOUS ONCE
Status: COMPLETED | OUTPATIENT
Start: 2025-05-15 | End: 2025-05-15

## 2025-05-15 RX ORDER — HYDROMORPHONE HYDROCHLORIDE 2 MG/ML
0.5 INJECTION, SOLUTION INTRAMUSCULAR; INTRAVENOUS; SUBCUTANEOUS EVERY 5 MIN PRN
Status: DISCONTINUED | OUTPATIENT
Start: 2025-05-15 | End: 2025-05-15 | Stop reason: HOSPADM

## 2025-05-15 RX ORDER — GABAPENTIN 400 MG/1
800 CAPSULE ORAL 3 TIMES DAILY
Status: DISCONTINUED | OUTPATIENT
Start: 2025-05-15 | End: 2025-05-16

## 2025-05-15 RX ORDER — HYDROMORPHONE HYDROCHLORIDE 2 MG/ML
2 INJECTION, SOLUTION INTRAMUSCULAR; INTRAVENOUS; SUBCUTANEOUS
Status: DISCONTINUED | OUTPATIENT
Start: 2025-05-15 | End: 2025-05-21

## 2025-05-15 RX ORDER — ONDANSETRON HYDROCHLORIDE 2 MG/ML
INJECTION, SOLUTION INTRAVENOUS
Status: DISCONTINUED | OUTPATIENT
Start: 2025-05-15 | End: 2025-05-15

## 2025-05-15 RX ORDER — FENTANYL CITRATE 50 UG/ML
INJECTION, SOLUTION INTRAMUSCULAR; INTRAVENOUS
Status: DISCONTINUED | OUTPATIENT
Start: 2025-05-15 | End: 2025-05-15

## 2025-05-15 RX ORDER — PREGABALIN 75 MG/1
75 CAPSULE ORAL
Status: COMPLETED | OUTPATIENT
Start: 2025-05-15 | End: 2025-05-15

## 2025-05-15 RX ORDER — SODIUM CHLORIDE, SODIUM LACTATE, POTASSIUM CHLORIDE, CALCIUM CHLORIDE 600; 310; 30; 20 MG/100ML; MG/100ML; MG/100ML; MG/100ML
INJECTION, SOLUTION INTRAVENOUS CONTINUOUS
Status: DISCONTINUED | OUTPATIENT
Start: 2025-05-15 | End: 2025-05-16

## 2025-05-15 RX ORDER — PROCHLORPERAZINE EDISYLATE 5 MG/ML
5 INJECTION INTRAMUSCULAR; INTRAVENOUS EVERY 6 HOURS PRN
Status: DISCONTINUED | OUTPATIENT
Start: 2025-05-15 | End: 2025-05-23 | Stop reason: HOSPADM

## 2025-05-15 RX ORDER — LEVOTHYROXINE SODIUM 88 UG/1
88 TABLET ORAL DAILY
Status: DISCONTINUED | OUTPATIENT
Start: 2025-05-16 | End: 2025-05-23 | Stop reason: HOSPADM

## 2025-05-15 RX ORDER — ONDANSETRON 8 MG/1
8 TABLET, ORALLY DISINTEGRATING ORAL EVERY 6 HOURS PRN
Status: DISCONTINUED | OUTPATIENT
Start: 2025-05-15 | End: 2025-05-23 | Stop reason: HOSPADM

## 2025-05-15 RX ORDER — HEPARIN SODIUM 5000 [USP'U]/ML
5000 INJECTION, SOLUTION INTRAVENOUS; SUBCUTANEOUS EVERY 12 HOURS
Status: DISCONTINUED | OUTPATIENT
Start: 2025-05-15 | End: 2025-05-23 | Stop reason: HOSPADM

## 2025-05-15 RX ORDER — INSULIN GLARGINE 100 [IU]/ML
40 INJECTION, SOLUTION SUBCUTANEOUS DAILY
Status: DISCONTINUED | OUTPATIENT
Start: 2025-05-16 | End: 2025-05-15 | Stop reason: DRUGHIGH

## 2025-05-15 RX ORDER — GLUCAGON 1 MG
1 KIT INJECTION
Status: DISCONTINUED | OUTPATIENT
Start: 2025-05-15 | End: 2025-05-15 | Stop reason: HOSPADM

## 2025-05-15 RX ORDER — INSULIN ASPART 100 [IU]/ML
20 INJECTION, SOLUTION INTRAVENOUS; SUBCUTANEOUS
Status: DISCONTINUED | OUTPATIENT
Start: 2025-05-15 | End: 2025-05-19

## 2025-05-15 RX ORDER — TACROLIMUS 1 MG/1
2 CAPSULE ORAL EVERY MORNING
Status: DISCONTINUED | OUTPATIENT
Start: 2025-05-16 | End: 2025-05-23 | Stop reason: HOSPADM

## 2025-05-15 RX ORDER — TRAZODONE HYDROCHLORIDE 50 MG/1
50 TABLET ORAL NIGHTLY
Status: DISCONTINUED | OUTPATIENT
Start: 2025-05-15 | End: 2025-05-23 | Stop reason: HOSPADM

## 2025-05-15 RX ORDER — MIDAZOLAM HYDROCHLORIDE 1 MG/ML
INJECTION INTRAMUSCULAR; INTRAVENOUS
Status: DISCONTINUED | OUTPATIENT
Start: 2025-05-15 | End: 2025-05-15

## 2025-05-15 RX ORDER — OXYCODONE HCL 10 MG/1
10 TABLET, FILM COATED, EXTENDED RELEASE ORAL
Status: COMPLETED | OUTPATIENT
Start: 2025-05-15 | End: 2025-05-15

## 2025-05-15 RX ORDER — ALUMINUM HYDROXIDE, MAGNESIUM HYDROXIDE, AND SIMETHICONE 1200; 120; 1200 MG/30ML; MG/30ML; MG/30ML
30 SUSPENSION ORAL EVERY 4 HOURS PRN
Status: DISCONTINUED | OUTPATIENT
Start: 2025-05-15 | End: 2025-05-23 | Stop reason: HOSPADM

## 2025-05-15 RX ORDER — OXYCODONE HYDROCHLORIDE 5 MG/1
5 TABLET ORAL
Status: DISCONTINUED | OUTPATIENT
Start: 2025-05-15 | End: 2025-05-15 | Stop reason: HOSPADM

## 2025-05-15 RX ORDER — AMOXICILLIN 250 MG
2 CAPSULE ORAL NIGHTLY PRN
Status: DISCONTINUED | OUTPATIENT
Start: 2025-05-15 | End: 2025-05-23 | Stop reason: HOSPADM

## 2025-05-15 RX ORDER — METHOCARBAMOL 750 MG/1
750 TABLET, FILM COATED ORAL 3 TIMES DAILY PRN
Status: DISCONTINUED | OUTPATIENT
Start: 2025-05-15 | End: 2025-05-16

## 2025-05-15 RX ORDER — CEFAZOLIN 2 G/1
2 INJECTION, POWDER, FOR SOLUTION INTRAMUSCULAR; INTRAVENOUS
Status: COMPLETED | OUTPATIENT
Start: 2025-05-15 | End: 2025-05-16

## 2025-05-15 RX ORDER — SODIUM CHLORIDE, SODIUM LACTATE, POTASSIUM CHLORIDE, CALCIUM CHLORIDE 600; 310; 30; 20 MG/100ML; MG/100ML; MG/100ML; MG/100ML
INJECTION, SOLUTION INTRAVENOUS CONTINUOUS PRN
Status: DISCONTINUED | OUTPATIENT
Start: 2025-05-15 | End: 2025-05-15

## 2025-05-15 RX ORDER — SODIUM CHLORIDE 0.9 % (FLUSH) 0.9 %
10 SYRINGE (ML) INJECTION DAILY PRN
Status: DISCONTINUED | OUTPATIENT
Start: 2025-05-15 | End: 2025-05-15 | Stop reason: HOSPADM

## 2025-05-15 RX ORDER — LABETALOL HYDROCHLORIDE 5 MG/ML
INJECTION, SOLUTION INTRAVENOUS
Status: DISCONTINUED | OUTPATIENT
Start: 2025-05-15 | End: 2025-05-15

## 2025-05-15 RX ORDER — HYDROMORPHONE HYDROCHLORIDE 2 MG/ML
2 INJECTION, SOLUTION INTRAMUSCULAR; INTRAVENOUS; SUBCUTANEOUS
Status: DISCONTINUED | OUTPATIENT
Start: 2025-05-15 | End: 2025-05-15

## 2025-05-15 RX ORDER — BUPIVACAINE HCL/EPINEPHRINE 0.5-1:200K
VIAL (ML) INJECTION
Status: DISCONTINUED | OUTPATIENT
Start: 2025-05-15 | End: 2025-05-15 | Stop reason: HOSPADM

## 2025-05-15 RX ORDER — MUPIROCIN 20 MG/G
OINTMENT TOPICAL 2 TIMES DAILY
Status: COMPLETED | OUTPATIENT
Start: 2025-05-15 | End: 2025-05-20

## 2025-05-15 RX ORDER — PROCHLORPERAZINE EDISYLATE 5 MG/ML
5 INJECTION INTRAMUSCULAR; INTRAVENOUS EVERY 30 MIN PRN
Status: DISCONTINUED | OUTPATIENT
Start: 2025-05-15 | End: 2025-05-15 | Stop reason: HOSPADM

## 2025-05-15 RX ORDER — ACETAMINOPHEN 325 MG/1
650 TABLET ORAL EVERY 6 HOURS
Status: DISCONTINUED | OUTPATIENT
Start: 2025-05-15 | End: 2025-05-23 | Stop reason: HOSPADM

## 2025-05-15 RX ADMIN — HYDROMORPHONE HYDROCHLORIDE 2 MG: 2 INJECTION, SOLUTION INTRAMUSCULAR; INTRAVENOUS; SUBCUTANEOUS at 04:05

## 2025-05-15 RX ADMIN — CEFAZOLIN 2 G: 2 INJECTION, POWDER, FOR SOLUTION INTRAMUSCULAR; INTRAVENOUS at 09:05

## 2025-05-15 RX ADMIN — HYDROMORPHONE HYDROCHLORIDE 0.5 MG: 2 INJECTION, SOLUTION INTRAMUSCULAR; INTRAVENOUS; SUBCUTANEOUS at 02:05

## 2025-05-15 RX ADMIN — OXYCODONE HYDROCHLORIDE 10 MG: 10 TABLET, FILM COATED, EXTENDED RELEASE ORAL at 06:05

## 2025-05-15 RX ADMIN — HYDROMORPHONE HYDROCHLORIDE 0.5 MG: 2 INJECTION INTRAMUSCULAR; INTRAVENOUS; SUBCUTANEOUS at 09:05

## 2025-05-15 RX ADMIN — LABETALOL HYDROCHLORIDE 10 MG: 5 INJECTION, SOLUTION INTRAVENOUS at 10:05

## 2025-05-15 RX ADMIN — PROPOFOL 50 MG: 10 INJECTION, EMULSION INTRAVENOUS at 11:05

## 2025-05-15 RX ADMIN — HYDROMORPHONE HYDROCHLORIDE 0.3 MG: 2 INJECTION INTRAMUSCULAR; INTRAVENOUS; SUBCUTANEOUS at 02:05

## 2025-05-15 RX ADMIN — PROPOFOL 100 MG: 10 INJECTION, EMULSION INTRAVENOUS at 09:05

## 2025-05-15 RX ADMIN — PROPOFOL 200 MG: 10 INJECTION, EMULSION INTRAVENOUS at 09:05

## 2025-05-15 RX ADMIN — METHOCARBAMOL 750 MG: 750 TABLET ORAL at 08:05

## 2025-05-15 RX ADMIN — TRAZODONE HYDROCHLORIDE 50 MG: 50 TABLET ORAL at 08:05

## 2025-05-15 RX ADMIN — SUGAMMADEX 200 MG: 100 INJECTION, SOLUTION INTRAVENOUS at 02:05

## 2025-05-15 RX ADMIN — METHOCARBAMOL 750 MG: 750 TABLET ORAL at 02:05

## 2025-05-15 RX ADMIN — HYDROMORPHONE HYDROCHLORIDE 0.5 MG: 2 INJECTION INTRAMUSCULAR; INTRAVENOUS; SUBCUTANEOUS at 11:05

## 2025-05-15 RX ADMIN — ONDANSETRON 4 MG: 2 INJECTION, SOLUTION INTRAMUSCULAR; INTRAVENOUS at 09:05

## 2025-05-15 RX ADMIN — HYDROMORPHONE HYDROCHLORIDE 0.5 MG: 2 INJECTION INTRAMUSCULAR; INTRAVENOUS; SUBCUTANEOUS at 12:05

## 2025-05-15 RX ADMIN — ONDANSETRON 4 MG: 2 INJECTION, SOLUTION INTRAMUSCULAR; INTRAVENOUS at 01:05

## 2025-05-15 RX ADMIN — PROPOFOL 30 MG: 10 INJECTION, EMULSION INTRAVENOUS at 12:05

## 2025-05-15 RX ADMIN — ACETAMINOPHEN 650 MG: 325 TABLET ORAL at 05:05

## 2025-05-15 RX ADMIN — ALUMINUM HYDROXIDE, MAGNESIUM HYDROXIDE, AND DIMETHICONE 30 ML: 200; 20; 200 SUSPENSION ORAL at 07:05

## 2025-05-15 RX ADMIN — LABETALOL HYDROCHLORIDE 5 MG: 5 INJECTION, SOLUTION INTRAVENOUS at 10:05

## 2025-05-15 RX ADMIN — METHOCARBAMOL 750 MG: 750 TABLET ORAL at 05:05

## 2025-05-15 RX ADMIN — PROPOFOL 50 MG: 10 INJECTION, EMULSION INTRAVENOUS at 10:05

## 2025-05-15 RX ADMIN — FENTANYL CITRATE 100 MCG: 0.05 INJECTION, SOLUTION INTRAMUSCULAR; INTRAVENOUS at 09:05

## 2025-05-15 RX ADMIN — SODIUM CHLORIDE, SODIUM LACTATE, POTASSIUM CHLORIDE, AND CALCIUM CHLORIDE: .6; .31; .03; .02 INJECTION, SOLUTION INTRAVENOUS at 09:05

## 2025-05-15 RX ADMIN — GABAPENTIN 800 MG: 400 CAPSULE ORAL at 07:05

## 2025-05-15 RX ADMIN — PROCHLORPERAZINE EDISYLATE 5 MG: 5 INJECTION INTRAMUSCULAR; INTRAVENOUS at 02:05

## 2025-05-15 RX ADMIN — SENNOSIDES AND DOCUSATE SODIUM 2 TABLET: 50; 8.6 TABLET ORAL at 07:05

## 2025-05-15 RX ADMIN — PREGABALIN 75 MG: 75 CAPSULE ORAL at 06:05

## 2025-05-15 RX ADMIN — HYDROMORPHONE HYDROCHLORIDE 0.5 MG: 2 INJECTION, SOLUTION INTRAMUSCULAR; INTRAVENOUS; SUBCUTANEOUS at 03:05

## 2025-05-15 RX ADMIN — ACETAMINOPHEN 650 MG: 325 TABLET ORAL at 07:05

## 2025-05-15 RX ADMIN — CEFAZOLIN 2 G: 2 INJECTION, POWDER, FOR SOLUTION INTRAMUSCULAR; INTRAVENOUS at 04:05

## 2025-05-15 RX ADMIN — ALLOPURINOL 100 MG: 100 TABLET ORAL at 07:05

## 2025-05-15 RX ADMIN — FENTANYL CITRATE 50 MCG: 0.05 INJECTION, SOLUTION INTRAMUSCULAR; INTRAVENOUS at 09:05

## 2025-05-15 RX ADMIN — ROCURONIUM BROMIDE 80 MG: 10 INJECTION, SOLUTION INTRAVENOUS at 09:05

## 2025-05-15 RX ADMIN — HEPARIN SODIUM 5000 UNITS: 5000 INJECTION INTRAVENOUS; SUBCUTANEOUS at 08:05

## 2025-05-15 RX ADMIN — SODIUM CHLORIDE, SODIUM LACTATE, POTASSIUM CHLORIDE, AND CALCIUM CHLORIDE: .6; .31; .03; .02 INJECTION, SOLUTION INTRAVENOUS at 08:05

## 2025-05-15 RX ADMIN — ALUMINUM HYDROXIDE, MAGNESIUM HYDROXIDE, AND DIMETHICONE 30 ML: 200; 20; 200 SUSPENSION ORAL at 04:05

## 2025-05-15 RX ADMIN — HYDROMORPHONE HYDROCHLORIDE 2 MG: 2 INJECTION INTRAMUSCULAR; INTRAVENOUS; SUBCUTANEOUS at 07:05

## 2025-05-15 RX ADMIN — PROPOFOL 50 MG: 10 INJECTION, EMULSION INTRAVENOUS at 02:05

## 2025-05-15 RX ADMIN — MIDAZOLAM HYDROCHLORIDE 2 MG: 1 INJECTION, SOLUTION INTRAMUSCULAR; INTRAVENOUS at 09:05

## 2025-05-15 RX ADMIN — CEFAZOLIN 2 G: 2 INJECTION, POWDER, FOR SOLUTION INTRAMUSCULAR; INTRAVENOUS at 01:05

## 2025-05-15 RX ADMIN — ACETAMINOPHEN 650 MG: 325 TABLET ORAL at 11:05

## 2025-05-15 RX ADMIN — MUPIROCIN: 20 OINTMENT TOPICAL at 08:05

## 2025-05-15 RX ADMIN — ROCURONIUM BROMIDE 20 MG: 10 INJECTION, SOLUTION INTRAVENOUS at 09:05

## 2025-05-15 RX ADMIN — OXYCODONE 15 MG: 5 TABLET ORAL at 05:05

## 2025-05-15 RX ADMIN — OXYCODONE 5 MG: 5 TABLET ORAL at 02:05

## 2025-05-15 RX ADMIN — SODIUM CHLORIDE, POTASSIUM CHLORIDE, SODIUM LACTATE AND CALCIUM CHLORIDE: 600; 310; 30; 20 INJECTION, SOLUTION INTRAVENOUS at 04:05

## 2025-05-15 RX ADMIN — TACROLIMUS 1 MG: 1 CAPSULE ORAL at 05:05

## 2025-05-15 RX ADMIN — HYDROMORPHONE HYDROCHLORIDE 2 MG: 2 INJECTION INTRAMUSCULAR; INTRAVENOUS; SUBCUTANEOUS at 11:05

## 2025-05-15 RX ADMIN — HYDROMORPHONE HYDROCHLORIDE 0.2 MG: 2 INJECTION INTRAMUSCULAR; INTRAVENOUS; SUBCUTANEOUS at 02:05

## 2025-05-15 RX ADMIN — INSULIN ASPART 20 UNITS: 100 INJECTION, SOLUTION INTRAVENOUS; SUBCUTANEOUS at 04:05

## 2025-05-15 RX ADMIN — LIDOCAINE HYDROCHLORIDE 100 MG: 20 INJECTION, SOLUTION INTRAVENOUS at 09:05

## 2025-05-15 RX ADMIN — OXYCODONE HYDROCHLORIDE 10 MG: 10 TABLET ORAL at 10:05

## 2025-05-15 NOTE — TRANSFER OF CARE
"Anesthesia Transfer of Care Note    Patient: Vick Gonzalez    Procedure(s) Performed: Procedure(s) (LRB):  ARTHRODESIS, SPINE, LUMBAR, OLIF, MINIMALLY INVASIVE (N/A)  FACETECTOMY (N/A)    Patient location: PACU    Anesthesia Type: general    Transport from OR: Transported from OR on 6-10 L/min O2 by face mask with adequate spontaneous ventilation    Post pain: adequate analgesia    Post assessment: no apparent anesthetic complications and tolerated procedure well    Post vital signs: stable    Level of consciousness: awake    Nausea/Vomiting: no nausea/vomiting    Complications: none    Transfer of care protocol was followed      Last vitals: Visit Vitals  BP (!) 184/91 (BP Location: Right arm, Patient Position: Lying)   Pulse 91   Temp 37.1 °C (98.8 °F) (Skin)   Resp 17   Ht 6' 2" (1.88 m)   Wt 111.1 kg (245 lb)   SpO2 99%   BMI 31.46 kg/m²     "

## 2025-05-15 NOTE — H&P
NEUROSURGICAL PROGRESS NOTE     DATE OF SERVICE:  05/15/25     ATTENDING PHYSICIAN:  Khadar Payne MD     SUBJECTIVE:     INTERIM HISTORY:     He is complaining of worsening low back pain, radiating down the bilateral buttock and posterolateral thigh.  He has difficulty standing upright and has to lean forward when he walks.  He is walking using a cane.  Pain is affecting his ability to stand, walk.  He is unable to do much physical activity at this time due to the severe pain.  He takes gabapentin 800 mg 3 times daily.  He takes Percocet 10 mg 3 times daily.  He is a nonsmoker.  He is not drinking at this time.     He is using his cervical spinal cord stimulator.  His neck pain has significantly improved following the spinal cord stimulator placement.  He does not complain of severe left arm pain anymore.  He has gait imbalance that has remained stable.           PAST MEDICAL HISTORY:       Active Ambulatory Problems     Diagnosis Date Noted    Chronic pain syndrome 07/13/2011    Acquired hypothyroidism      History of hepatitis C, s/p successful Rx w/ [id number] (cure) - 5/2018 08/23/2011    Gout, unspecified 03/14/2011    Substance abuse 10/24/2010    Thrombocytopenia 06/16/2010    Anxiety      Lumbar radiculopathy 04/08/2015    Acquired spondylolisthesis 05/12/2015    Essential hypertension 06/19/2015    Type 2 diabetes mellitus with diabetic polyneuropathy, with long-term current use of insulin 10/04/2016    S/P liver transplant 10/04/2016    Hypertriglyceridemia 10/05/2016    CKD (chronic kidney disease), stage III      PAD (peripheral artery disease) 08/30/2017    Erectile dysfunction due to arterial insufficiency 10/09/2017    BPH with urinary obstruction 10/09/2017    Immunosuppressed status 10/16/2017    Low testosterone in male 07/25/2018    Hypertension associated with diabetes 12/01/2020    Hyperlipidemia associated with type 2 diabetes mellitus 12/01/2020    Erectile dysfunction associated with type  2 diabetes mellitus 12/01/2020    Claudication in peripheral vascular disease 12/01/2020    Aortic atherosclerosis 11/16/2010    Calcified granuloma of lung 10/22/2010    Carpal tunnel syndrome of left wrist 07/13/2021    Acute congestive heart failure 05/12/2023    Alcohol dependence, in remission 05/26/2023    Pulmonary emphysema, unspecified emphysema type 05/26/2023    Coronary artery disease involving native coronary artery of native heart with angina pectoris 08/24/2023    Epistaxis 12/04/2023    Complex regional pain syndrome type 2 of left upper extremity 07/12/2024    Cervical myelopathy 08/26/2024    Hepatic cirrhosis, unspecified hepatic cirrhosis type, unspecified whether ascites present 08/26/2024    Polyneuropathy associated with underlying disease 08/26/2024    S/P insertion of spinal cord stimulator 10/17/2024           Resolved Ambulatory Problems     Diagnosis Date Noted    Abdominal pain, generalized 08/25/2011    Abdominal pain, right upper quadrant 09/16/2010    Abdominal tenderness, right upper quadrant 05/15/2011    Unspecified chronic liver disease without mention of alcohol      Hypertension      Acute alcoholic hepatitis 08/02/2010    Acute bronchitis 03/14/2011    Acute gouty arthropathy 08/28/2011    Aftercare following organ transplant 06/17/2010    Alcoholic cirrhosis of liver 10/11/2010    Anemia of other chronic disease 07/15/2010    Cholangitis 07/15/2010    Chronic hepatitis C with hepatic coma 06/08/2011    Cirrhosis of liver without mention of alcohol 07/28/2011    Complications of transplanted liver 05/11/2011    Dietary surveillance and counseling 07/28/2011    Encephalopathy, unspecified 06/16/2010    Family history of diabetes mellitus 07/15/2010    Hematuria, unspecified 02/11/2012    Hepatomegaly 08/23/2011    Liver replaced by transplant 08/23/2011    Encounter for long-term (current) use of steroids 06/02/2011    Encounter for long-term (current) use of aspirin 01/16/2011     Microscopic hematuria 05/11/2011    Mononeuritis of unspecified site 07/15/2010    Need for prophylactic immunotherapy 06/02/2011    Nocturia 11/02/2010    Alcohol abuse, in remission 06/02/2010    Obstruction of bile duct 10/24/2010    Open wound of finger(s) , without mention of complication 07/12/2011    Other and unspecified alcohol dependence, unspecified drinking behavior 01/16/2011    Other ascites 06/16/2010    Other cells and casts in urine 11/02/2010    Other sequelae of chronic liver disease 07/28/2011    Other specified disorders of biliary tract 10/23/2010    Other specified disorders of liver 09/26/2010    Peptic ulcer, unspecified site, unspecified as acute or chronic, without mention of hemorrhage, perforation, or obstruction 10/20/2010    Personal history of other infectious and parasitic disease 11/15/2010    Portal hypertension 07/26/2010    Secondary diabetes mellitus without mention of complication, uncontrolled 10/24/2010    Unspecified disorder of male genital organs 11/02/2010    Genital herpes, unspecified 06/02/2011    Orchitis and epididymitis, unspecified 10/12/2010    Unspecified viral hepatitis C without hepatic coma 10/20/2010    Hepatitis C 12/10/2012    Genital herpes      Hyperpigmentation 10/21/2014    Diskitis 01/15/2015    Lower back pain 01/15/2015    Discitis of lumbosacral region 01/16/2015    Lumbar discitis 06/15/2015    Lumbar myelopathy 06/18/2015    Abnormal gait 07/09/2015    Muscle weakness 07/09/2015    LBP (low back pain) 07/09/2015    S/P lumbar spinal fusion 08/11/2015    Hyponatremia 10/04/2016    OSMANY (acute kidney injury) 10/04/2016    Volume depletion 02/28/2017    Ischemic leg 08/30/2017    Sacroiliitis 12/02/2019    Abdominal pain 01/23/2020    SBO (small bowel obstruction)      Sepsis 11/11/2020    Transaminitis 11/11/2020    Right lower quadrant abdominal pain 11/12/2020    Diarrhea 11/13/2020    Severe obesity (BMI 35.0-39.9) with comorbidity 12/01/2020     Diabetic polyneuropathy associated with type 2 diabetes mellitus 12/01/2020    Penetrating foot wound 10/11/2021    Diabetic foot infection 10/11/2021    Left hand weakness 02/01/2022    Fine motor impairment 02/14/2022    Loss of feeling or sensation 02/14/2022    Myeloradiculopathy 08/22/2022    Chronic neck pain with history of cervical spinal surgery 01/10/2023    Hypomagnesemia 05/13/2023    NSTEMI (non-ST elevated myocardial infarction) 08/02/2023           Past Medical History:   Diagnosis Date    Anemia      Cataract      Diabetes mellitus type II, uncontrolled      ED (erectile dysfunction)      Encounter for blood transfusion      Gout, arthritis      History of alcohol abuse      History of positive PPD, treatment status unknown      History of substance abuse      Hypothyroidism      Pancreatitis 2016    Peptic ulcer disease           PAST SURGICAL HISTORY:        Past Surgical History:   Procedure Laterality Date    ANTERIOR CERVICAL DISCECTOMY W/ FUSION N/A 8/22/2022     Procedure: Procedure: ACDF 4-7 LOS: 4.0 Anesthesia: General Blood: Type & Screen Radiology: C-Arm Microscope: ------- SNS: EMG, SEP, MEP Brace: [place] Bed: Regular Bed, Shoulder Strap Headrest:------ Position: Supine Equipment: Depuy;  Surgeon: Titi Christian MD;  Location: [facility] OR;  Service: Neurosurgery;  Laterality: N/A;  Depuy  confirmed CW 8/19  Neuro monitoring confirmed CW 8/19    CARPAL TUNNEL RELEASE Left 10/29/2021     Procedure: RELEASE, CARPAL TUNNEL;  Surgeon: Jameson Alejo Jr., MD;  Location: [facility] OR;  Service: Orthopedics;  Laterality: Left;    CHOLECYSTECTOMY        CORONARY ANGIOGRAPHY N/A 8/2/2023     Procedure: ANGIOGRAM, CORONARY ARTERY;  Surgeon: Juan Vera MD;  Location: [facility] CATH LAB/EP;  Service: Cardiology;  Laterality: N/A;    DECOMPRESSION OF CERVICAL SPINE BY ANTERIOR APPROACH WITH FUSION   8/22/2022     Procedure: DECOMPRESSION AND FUSION, SPINE, CERVICAL, ANTERIOR APPROACH;  Surgeon: Titi  RAHEEL Christian MD;  Location: [facility] OR;  Service: Neurosurgery;;    INJECTION OF JOINT Right 12/2/2019     Procedure: INJECTION, JOINT SI;  Surgeon: Thu Penn MD;  Location: [facility] PAIN MGT;  Service: Pain Management;  Laterality: Right;  RT SI JNT INJ    INSERTION OF SPINAL NEUROSTIMULATOR N/A 9/3/2024     Procedure: INSERTION, NEUROSTIMULATOR, SPINAL;  Surgeon: Khadar Payne MD;  Location: [facility] OR;  Service: Neurosurgery;  Laterality: N/A;  Procedure:C1-2 generator placement  Length of procedure:1.5 hours  LOS: 0-1 nights  Anesthesia:General  Blood:Type and screen  Radiology:C-arm  Bed:Regular Bed  Headrest:[place]  Position:Prone  Equipment:Giraldo-Doc Bob    LAMINECTOMY, SPINE, FOR NEUROSTIMULATOR ELECTRODE INSERTION N/A 8/29/2024     Procedure: LAMINECTOMY, SPINE, FOR NEUROSTIMULATOR ELECTRODE INSERTION;  Surgeon: Khadar Payne MD;  Location: [facility] OR;  Service: Neurosurgery;  Laterality: N/A;  Procedure: C1-2 laminectomy for paddle lead placement for spinal cord stimulator trial, extension   Length of procedure: 2 hours  LOS: 0 nights  Anesthesia:General  Blood:Type and screen  Radiology:C-arm  SNS:EMG,SEP,MEP  Position:Pro    LEFT HEART CATHETERIZATION Left 8/2/2023     Procedure: Left heart cath;  Surgeon: Juan Vera MD;  Location: [facility] CATH LAB/EP;  Service: Cardiology;  Laterality: Left;    LIVER TRANSPLANT   06/2010    SPINE SURGERY        TRANSFORAMINAL EPIDURAL INJECTION OF STEROID Left 5/29/2023     Procedure: INJECTION, STEROID, EPIDURAL, TRANSFORAMINAL APPROACH,  LEFT C7-T1 DIRECT REF;  Surgeon: Thu Penn MD;  Location: [facility] PAIN MGT;  Service: Pain Management;  Laterality: Left;  4/3 MD ILL/PT WILL C/B         SOCIAL HISTORY:   [Social History]    [Social History]        Socioeconomic History    Marital status:    Tobacco Use    Smoking status: Former       Passive exposure: Never    Smokeless tobacco: Never   Substance and Sexual Activity    Alcohol use: No       Comment: over 5  years ago, none currently    Drug use: Not Currently       Comment: Former cocaine use    Sexual activity: Yes      Social Drivers of Health           Financial Resource Strain: Low Risk  (8/30/2024)     Overall Financial Resource Strain (CARDIA)      Difficulty of Paying Living Expenses: Not hard at all   Food Insecurity: No Food Insecurity (8/30/2024)     Hunger Vital Sign      Worried About Running Out of Food in the Last Year: Never true      Ran Out of Food in the Last Year: Never true   Transportation Needs: No Transportation Needs (8/30/2024)     TRANSPORTATION NEEDS      Transportation : No   Physical Activity: Sufficiently Active (8/30/2024)     Exercise Vital Sign      Days of Exercise per Week: 7 days      Minutes of Exercise per Session: 30 min   Recent Concern: Physical Activity - Insufficiently Active (8/22/2024)     Exercise Vital Sign      Days of Exercise per Week: 2 days      Minutes of Exercise per Session: 20 min   Stress: No Stress Concern Present (8/30/2024)     [employer] - Occupational Stress Questionnaire      Feeling of Stress : Not at all   Recent Concern: Stress - Stress Concern Present (8/22/2024)     [employer] - Occupational Stress Questionnaire      Feeling of Stress : To some extent   Housing Stability: Unknown (8/30/2024)     Housing Stability Vital Sign      Unable to Pay for Housing in the Last Year: No      Homeless in the Last Year: No        FAMILY HISTORY:         Family History   Problem Relation Name Age of Onset    Cancer Mother        Diabetes Mother        Heart disease Mother        Diabetes Sister        Cancer Maternal Uncle   82         colon CA    Drug abuse Daughter        Melanoma Neg Hx        Psoriasis Neg Hx        Lupus Neg Hx        Eczema Neg Hx        Acne Neg Hx             CURRENTS MEDICATIONS:  [Medications Ordered Prior to Encounter]    [Medications Ordered Prior to Encounter]         Current  Outpatient Medications on File Prior to Visit   Medication Sig Dispense Refill    allopurinoL (ZYLOPRIM) 100 MG tablet TAKE 1 TABLET BY MOUTH TWICE A  tablet 3    aluminum-magnesium hydroxide-simethicone (MAALOX) 200-200-20 mg/5 mL Susp Take 30 mLs by mouth 4 (four) times daily before meals and nightly. 769 mL 0    aspirin (ECOTRIN) 81 MG EC tablet Take 1 tablet (81 mg total) by mouth once daily.        atorvastatin (LIPITOR) 40 MG tablet Take 1 tablet (40 mg total) by mouth once daily. 90 tablet 3    blood-glucose meter,continuous (DEXCOM G7 ) Misc Use as directed. 1 each 11    blood-glucose sensor (DEXCOM G7 SENSOR) Viviane Use as directed. 3 each 11    blood-glucose transmitter (DEXCOM G6 TRANSMITTER) Viviane Use as directed 1 each 11    calcium carbonate-vitamin D3 (CALCIUM 600 WITH VITAMIN D3) 600 mg(1,500mg) -400 unit Chew Take 1 tablet by mouth once daily.         cyanocobalamin (VITAMIN B-12) 100 MCG tablet Take 100 mcg by mouth once daily.        diphenhydrAMINE (BENADRYL) 25 mg capsule Take 25 mg by mouth daily as needed for Allergies (Cold).        furosemide (LASIX) 20 MG tablet Take 2 tablets (40 mg total) by mouth daily as needed (For Weight Gain > 2-3 lbs in 1 day or 4-6 lbs over 1 week notify PCP and take 40 mg daily for three days). 60 tablet 0    gabapentin (NEURONTIN) 800 MG tablet TAKE 1 TABLET BY MOUTH THREE TIMES A DAY 90 tablet 11    insulin glargine U-300 conc (TOUJEO MAX U-300 SOLOSTAR) 300 unit/mL (3 mL) insulin pen Inject 40 Units into the skin once daily. 3 mL 11    insulin lispro (HUMALOG KWIKPEN INSULIN) 100 unit/mL pen Inject 20 Units into the skin 3 (three) times daily with meals. 60 mL 11    lancets 30 gauge Misc 1 lancet by Misc.(Non-Drug; Combo Route) route 4 (four) times daily before meals and nightly. 200 each 11    levothyroxine (SYNTHROID) 88 MCG tablet TAKE 1 TABLET BY MOUTH EVERY DAY 90 tablet 3    lisinopriL (PRINIVIL,ZESTRIL) 20 MG tablet TAKE 1 TABLET BY MOUTH  "EVERY DAY 90 tablet 3    methocarbamoL (ROBAXIN) 750 MG Tab Take 1 tablet (750 mg total) by mouth 3 (three) times daily as needed (muscle spasms). 60 tablet 0    metoprolol succinate (TOPROL-XL) 200 MG 24 hr tablet Take 1 tablet (200 mg total) by mouth once daily. 90 tablet 3    multivit with min-folic acid (MEN'S MULTIVITAMIN GUMMIES) 200 mcg Chew Take 1 tablet by mouth once daily.        NIFEdipine (PROCARDIA-XL) 30 MG (OSM) 24 hr tablet TAKE 1 TABLET BY MOUTH EVERY DAY 90 tablet 3    NOVOFINE PLUS 32 gauge x 1/6" Ndle          oxyCODONE-acetaminophen (PERCOCET)  mg per tablet Take 1 tablet by mouth every 8 (eight) hours as needed for Pain. Quantity greater than 7 day supply medically necessary 90 tablet 0    papaverine 30 mg/mL injection Tri-Mix - PGE (alprostadil) 10mcg, papavarine 30mg, phentolamine 1mg; dispense 5mL vial, typical starting is 0.2 mL which is equal to 20 units (Patient taking differently: as needed. Tri-Mix - PGE (alprostadil) 10mcg, papavarine 30mg, phentolamine 1mg; dispense 5mL vial, typical starting is 0.2 mL which is equal to 20 units) 10 mL 5    polyethylene glycol (MIRALAX) 17 gram PwPk Take 17 g by mouth once daily.        sildenafiL (VIAGRA) 100 MG tablet Take 1 tablet (100 mg total) by mouth daily as needed for Erectile Dysfunction. 30 tablet 11    sildenafiL (VIAGRA) 50 MG tablet Take 1 tablet (50 mg total) by mouth daily as needed for Erectile Dysfunction. 30 tablet 0    tacrolimus (PROGRAF) 1 MG Cap Take 2 capsules (2 mg total) by mouth every morning AND 1 capsule (1 mg total) every evening. 90 capsule 11    tirzepatide (MOUNJARO) 2.5 mg/0.5 mL PnIj Inject 2.5 mg into the skin every 7 days. 4 Pen 11    traZODone (DESYREL) 50 MG tablet TAKE 1 TABLET BY MOUTH EVERY DAY IN THE EVENING 90 tablet 3    TRUE METRIX GLUCOSE TEST STRIP Strp USE 3 TIMES DAILY TO TEST BLOOD GLUCOSE LEVEL 100 strip 11    blood-glucose meter Misc 1 Device by Misc.(Non-Drug; Combo Route) route 3 (three) " times daily. 1 each 0    [DISCONTINUED] insulin aspart U-100 (NOVOLOG FLEXPEN U-100 INSULIN) 100 unit/mL (3 mL) InPn pen Inject 20 Units into the skin 3 (three) times daily with meals. 15 mL 11      No current facility-administered medications on file prior to visit.        ALLERGIES:  Review of patient's allergies indicates:  No Known Allergies     REVIEW OF SYSTEMS:  Review of Systems   Constitutional:  Negative for diaphoresis, fever and weight loss.   Respiratory:  Negative for shortness of breath.    Cardiovascular:  Negative for chest pain.   Gastrointestinal:  Negative for blood in stool.   Genitourinary:  Negative for hematuria.   Endo/Heme/Allergies:  Does not bruise/bleed easily.   All other systems reviewed and are negative.           OBJECTIVE:     PHYSICAL EXAMINATION:       Vitals:     04/30/25 0814   BP: (!) 193/95   Pulse: 80         Physical Exam:  Vitals reviewed.     Constitutional: He appears well-developed and well-nourished.      Eyes: Pupils are equal, round, and reactive to light. Conjunctivae and EOM are normal.      Cardiovascular: Normal distal pulses and no edema.      Abdominal: Soft.      Skin: Skin displays no rash on trunk and no rash on extremities. Skin displays no lesions on trunk and no lesions on extremities.      Psych/Behavior: He is alert. He is oriented to person, place, and time. He has a normal mood and affect.      Musculoskeletal:        Neck: Range of motion is full.      Neurological:        DTRs: Tricep reflexes are 2+ on the right side and 2+ on the left side. Bicep reflexes are 2+ on the right side and 2+ on the left side. Brachioradialis reflexes are 2+ on the right side and 2+ on the left side. Patellar reflexes are 2+ on the right side and 2+ on the left side. Achilles reflexes are 0 on the right side and 0 on the left side.         Back Exam      Muscle Strength   Right Quadriceps:  5/5   Left Quadriceps:  5/5   Right Hamstrings:  5/5   Left Hamstrings:  5/5                         Neurological Exam  Mental Status  Alert. Oriented to person, place, and time. Speech is normal.     Cranial Nerves  CN III, IV, VI: Extraocular movements intact bilaterally. Pupils equal round and reactive to light bilaterally.     Motor   Normal muscle tone.                                                Right                     Left  Deltoid                                   5                          5   Biceps                                   5                          5   Triceps                                  5                          5   Interossei                              5                          5   Iliopsoas                               4+                          4+   Quadriceps                           5                          5   Hamstring                             5                          5   Gastrocnemius                     5                           5   Anterior tibialis                      5                          5   Posterior tibialis                    5                          5   Peroneal                               5                          5     Sensory  Light touch is normal in upper and lower extremities. Pinprick is normal in upper and lower extremities.      Reflexes                                            Right                      Left  Brachioradialis                    2+                         2+  Biceps                                 2+                         2+  Triceps                                2+                         2+  Patellar                                2+                         2+  Achilles                                0                         0  Right Plantar: normal  Left Plantar: normal     Right pathological reflexes: Olu's absent. Ankle clonus absent.  Left pathological reflexes: Olu's absent. Ankle clonus absent.           DIAGNOSTIC DATA:  I personally interpreted the following imaging:    CT myelogram of the cervical, thoracic and lumbar spine shows severe L4-5 spinal stenosis.  Status post L5-S1 fusion     ASSESSMENT:  This is a 69 y.o. male with      Problem List Items Addressed This Visit                  Neuro     Chronic pain syndrome      Other Visit Diagnoses           Adjacent segment disease of lumbar spine with history of fusion procedure    -  Primary       Status post cervical spinal fusion           Spinal cord stimulator status           Neurogenic claudication                       PLAN:  I explained the natural history of the disease and all treatment options.  In order to treat his neurogenic claudication symptoms I recommended a left L4-5 oblique interbody fusion, placement of interbody spacer, DePuy Conduit LLIF spacer filled with allograft BMP and DBM, L4-5 laminectomy, medial facetectomy, foraminotomy, L4-5 posterolateral arthrodesis and instrumentation using autograft harvested from the same incision and allograft DBM.      We have discussed the risks of surgery including death, coma, bleeding, infection, failure of surgery, CSF leak, nerve root injury, spinal cord injury, ureter injury, weakness, paralysis, peripheral neuropathy, malplaced hardware, migration of hardware, non-union, need for reoperation. Patient understands the risks and would like to proceed with surgery.     More than 50% of the time was spent on discussing conservative management treatments (medication, physical therapy exercises) and possible interventions (spinal injections and surgical procedures). Care coordination was discussed.     30 min       Khadar Payne MD  Cell:[phone]

## 2025-05-15 NOTE — OP NOTE
Date of surgery 05/15/2025    Preop diagnosis   1. 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 Lumbar spinal stenosis with neurogenic claudication  3. History of L5-S1 fusion    Postop diagnosis   Same    Surgery   1. Left L4-5 oblique interbody fusion, placement of interbody spacer, DePuy Conduit LLIF spacer filled with allograft BMP and DBM  2. L4-S1 posterior segmental instrumentation using DePuy Viper Prime system  3. L4-5 laminectomy, medial facetectomy, foraminotomy  4. Registration of navigated instrumentation using [facility]    Complexity  This was deemed to be a more complex procedure requiring 1 hour more due to the revision nature of the surgery and the need to remove previously installed hardware at L5-S1 in order to place new hardware in.    Surgeon   Khadar Payne MD    Indication   He is complaining of worsening low back pain, radiating down the bilateral buttock and posterolateral thigh.  He has difficulty standing upright and has to lean forward when he walks.  He is walking using a cane.  Pain is affecting his ability to stand, walk.  He is unable to do much physical activity at this time due to the severe pain.  He takes gabapentin 800 mg 3 times daily.  He takes Percocet 10 mg 3 times daily.  He is a nonsmoker.  He is not drinking at this time.     He is using his cervical spinal cord stimulator.  His neck pain has significantly improved following the spinal cord stimulator placement.  He does not complain of severe left arm pain anymore.  He has gait imbalance that has remained stable.    Procedure   The patient was intubated under general anesthesia and positioned in lateral decubitus, left side up on a radiolucent table.  All pressure points were carefully padded.  An axillary roll was placed.  The left flank and abdominal area was prepped and draped in a typical sterile fashion.  Using fluoroscopy we planned an  anterolateral incision in line with the L4-5 disc space.  Local anesthesia with 1% lidocaine with epi.  The skin was incised and hemostasis was carried out.  The external oblique muscle fascia was divided sharply.  Blunt dissection through the muscle wall down to the retroperitoneal space.  A quarter was created in front of the psoas muscle.  Using serial dilation we placed a 3 blade retractor in line with the L4-5 disc space in front of the psoas muscle.  The retractor was opened.  Using AP and lateral fluoroscopy we divided the ipsilateral annulus, we used angled Jessica to divide the ipsilateral and contralateral annulus.  We then used serial Manas and scraped all disc material from the endplates.  After multiple trials we placed a final interbody spacer measuring 60 mm x 16 mm by 26 mm filled with allograft BMP and DBM.  Good placement of the spacer was confirmed with fluoroscopy.  Irrigation and hemostasis.  The retractor was removed.  The muscular fascia was closed with interrupted 0 Vicryl.  The dermal layer was closed with inverted interrupted 3-0 Vicryl.  The skin was closed with staples.    The patient was then flipped prone on the Jareth table.  All pressure points were carefully padded.  The thoracolumbar area was prepped and draped in a typical sterile fashion.  We planned paramedian incision based on the prior scars.  Call anesthesia with 1% lidocaine with epi.  The skin was incised and hemostasis was carried out.  The thoracolumbar fascia was divided sharply bilaterally.  Blunt dissection through the per axial muscles.  We identified the previously installed hardware.  All the caps, rods and screws were removed.  There was significant scar tissue that needed to be dissected in order to remove the hardware.  We then inserted 2 array pins in the right PSIS and our navigation array.  We then proceeded with a 3D spin imaging and the reconstructed images were transferred to the [facility] station for  navigation.  We confirmed accuracy with the pointer and registered our navigated drill guide and pedicle screwdriver.  Using the navigated drill guide at 35 mm of depth we cannulated the pedicles of L4 bilaterally and K-wires were inserted.  We also opted to use new trajectory on the left side at L5 and S1 and on the right side at L5.  We used the same trajectory at S1 on the right side.  6.0 x 45 mm screws were inserted in the bilateral pedicle of L4 and 7.0 x 45 mm screws were inserted in the bilateral pedicles of L5 and S1.  Positioning of all the screws was confirmed with fluoroscopy.  Using navigation we planned a paramedian incision on the left side to proceed with a tubular minimally invasive laminectomy, medial facetectomy and foraminotomy.  Local anesthesia with 1% lidocaine with epi.  The skin was incised on the left side para medially and hemostasis was carried out down to the thoracolumbar fascia.  We then used serial dilation and placed a tubular retractor measuring 7 cm x 18 mm at the junction of the left L4-5 lamina and medial facet.  Under microscopic visualization we proceeded with subperiosteal dissection with the Bovie.  We then used the high-speed drill to drill the inferior L4 and superior L5 laminectomy, the medial facet bilaterally were drilled all the way down to the lateral recesses.  The yellow ligament between L4 and L5 was completely resected to decompress the dura and the lateral recesses bilaterally.  A contralateral foraminotomy was performed with Kerrisons.  Good decompression was obtained.  Hemostasis with Gelfoam powder mixed with thrombin.  We left a 10 Persian Nahid drain in the epidural space and tunneled the drain superiorly on the left side through the skin.  The drain was fixated with 2-0 nylon.    We then reduced precontoured titanium rods over the screw tulips with caps.  All caps were torqued.  The tower tabs were snapped.  Good positioning of the instrumentation was  confirmed with fluoroscopy.  Irrigation and hemostasis.  The thoracolumbar fascia was closed with interrupted 0 Vicryl.  The dermal layer was closed with inverted interrupted 2-0 Vicryl.  The skin was closed with staples.  Blood loss was estimated at 200 cc.  No complications.

## 2025-05-15 NOTE — ANESTHESIA PROCEDURE NOTES
Intubation    Date/Time: 5/15/2025 9:18 AM    Performed by: Nuno Walker Jr., CRNA  Authorized by: Vaughn Carr MD    Intubation:     Induction:  Intravenous    Intubated:  Postinduction    Mask Ventilation:  Easy with oral airway    Attempts:  1    Attempted By:  Student (JOYCE Chavira)    Method of Intubation:  Video laryngoscopy and bougie    Blade:  Nagel 3 (use Nagel 4 for future procedures)    Laryngeal View Grade: Grade I - full view of cords      Difficult Airway Encountered?: No      Complications:  None    Airway Device:  Oral endotracheal tube    Airway Device Size:  8.0    Style/Cuff Inflation:  Cuffed (inflated to minimal occlusive pressure)    Tube secured:  22    Secured at:  The lips    Placement Verified By:  Capnometry    Complicating Factors:  Obesity and anterior larynx    Findings Post-Intubation:  BS equal bilateral and atraumatic/condition of teeth unchanged

## 2025-05-15 NOTE — PLAN OF CARE
Physical Therapy Treatment Note and 30 Day Progress Note  [address] [place] [address]    Patient: Carmine Garcia                                                 Visit Date: 2024  :     1937    Referring practitioner:    Efrain Dobson DO  Date of Initial Visit:          Type: THERAPY  Noted: 2024    Patient seen for 6 sessions    Visit Diagnoses:    ICD-10-CM ICD-9-CM   1. Acute midline low back pain without sciatica  M54.50 724.2       SUBJECTIVE     Subjective: Reports that he has been hurting. He has one spot that has been burning like a hot poker, and he has been living off of tylenol. The pain was moving around quite a bit before,though now has centralized to his R SIJ where the painful spot is. He wasn't hurting anywhere when he woke up this morning, though the pain returned as soon as he sat up.       PAIN: 2-3/10         OBJECTIVE     Objective     Spine ROM    LUMBAR ROM  Pain / laterality    Flexion  100%    Extension  50%    L lateral flexion  50%* R   R lateral flexion  50%* R   L Rotation  75%* R   R Rotation  75%            Therapeutic Exercises    41282 Units Comments   All goals assessed for PN           HL and Standing pelvic assessment  x2 R elevated, anteriorly rotated    HL hip adduction with ball x15     HL hip abduction with manual resistance x15     HL R hamstring curls with 45cm SB x15     Pelvic MET  x15     Outcome:   Corrected obliquity    Timed Minutes  25     Manual Therapy     [id number]  Comments   L JASON sacral mobs, PA sacral mobs     Lumbar rotational mobs     Manual LS distraction             Timed Minutes 15          Therapy Education/Self Care [id number]   Education offered today    [employer] Code    Ongoing HEP   Access Code: [email or web address]  URL: [email or web address]  Date: 2024  Prepared by: Tera Brandon     Exercises  - Seated March  -  x daily - 7 x weekly - 3 sets - 10 reps  - Seated   VIRTUAL NURSE: Pt arrived to unit. Permission received to turn camera to view patient. VIP model explained; Patient informed this VN will be working with bedside nurse and the rest of the care team.      Admission questions completed.  Plan of care reviewed with patient.  Educated patient on VTE and fall risk. Safety precautions in place. Call light within reach, side rails up x2.     Patient instucted to ask staff for assistance. Patient verbalized complete understanding.  Will continue to be available and intervene as needed.    Labs, notes, orders, and careplan reviewed.     05/15/25 1807   Admission Complete   Admission Complete by VN Complete     Problem: Adult Inpatient Plan of Care  Goal: Plan of Care Review  Outcome: Progressing  Flowsheets (Taken 5/15/2025 1719)  Plan of Care Reviewed With: patient  Goal: Patient-Specific Goal (Individualized)  Outcome: Progressing      Lumbar Flexion Stretch  - 1 x daily - 7 x weekly - 3 sets - 10 reps  - Seated Hip Abduction with Resistance  - 1 x daily - 7 x weekly - 3 sets - 10 reps  - Seated Hip Adduction Isometrics with Ball  - 1 x daily - 7 x weekly - 3 sets - 10 reps  - Seated Pelvic Tilt  - 1 x daily - 7 x weekly - 3 sets - 10 reps   Timed Minutes        Total Timed Treatment:     40   mins  Total Time of Visit:             40   mins         ASSESSMENT/PLAN     GOALS  Goals                                                  Progress Note due by 7/25/24                                                              Recert due by 8/25/24   STG by: 6 weeks Comments Date Status   Improve renetta thoracolumbar mobility  See table for details   6/25  Ongoing   Improve muscle relaxation of B thoracolumbar paraspinals  improved, though continued guarding   6/25  Ongoing   Improve  bilateral hip ER/IR to within 10 deg of WNL  L: ER 63; IR 35  R: ER 68; IR 50  6/25 Ongoing             LTG by: 12 weeks         Able to perform bed mobility without exacerbation of pain  Reports that this has been going better.   6/25  Ongoing   SLS either leg x 5 secs  3 sec B   6/25  Ongoing   Reports pain no greater than 2/10 when it does occur.  3-3.5/10 at worst over the past week   6/25  Ongoing   Improve Modified Oswestry to 8/50 or less  6/25: 23/50  6/25  Ongoing   Independent with HEP for flexibility and core/hip stability.  Performs every other day and has increased his reps   6/25  Ongoing       Assessment & Plan       Assessment  Impairments: abnormal coordination, abnormal gait, abnormal muscle firing, abnormal or restricted ROM, activity intolerance, impaired balance, impaired physical strength, lacks appropriate home exercise program and pain with function   Functional limitations: carrying objects, lifting, sleeping, walking, uncomfortable because of pain, moving in bed, sitting, standing, stooping and unable to perform repetitive tasks   Assessment details:      Prognosis: good    Plan  Therapy options: will be seen for skilled therapy services  Planned modality interventions: cryotherapy, dry needling, electrical stimulation/Russian stimulation, iontophoresis, low level laser therapy, TENS, thermotherapy (hydrocollator packs), traction, ultrasound and high voltage pulsed current (spasm management)  Planned therapy interventions: abdominal trunk stabilization, ADL retraining, balance/weight-bearing training, body mechanics training, fine motor coordination training, flexibility, functional ROM exercises, gait training, home exercise program, IADL retraining, joint mobilization, manual therapy, motor coordination training, neuromuscular re-education, postural training, soft tissue mobilization, spinal/joint mobilization, strengthening, stretching and therapeutic activities  Frequency: 2x week  Duration in weeks: 12  Treatment plan discussed with: patient         ASSESSMENT: Goals assessed for Progress Note Today. No goals met at this time, though he has only had 4 treatment sessions since IE not including today.  He presented with a slight pelvic obliquity today, which was corrected with pelvic MET. He continues to have difficulty with bed mobility and cannot lie on his back without increased pain in the low back. His symptoms have centralized to his R SIJ and then were more toward midline following pelvic MET. Introduced sacral mobs today, though this is not something I would recommend continuing with, as the effort of performing bed mobility to and from prone was rather difficult, and he ended up in more pain following this. The Pt would benefit from skilled PTx to decrease pain, improve functional mobility, progress toward goals, and increase overall quality of life.      PLAN:   Continue to progress as symptoms allow.    SIGNATURE: Nelly Evans PT T, [place] License #: [id number]    Electronically Signed on 6/25/2024        [address] [facility] [place]  [address]  [phone]

## 2025-05-16 LAB
ABSOLUTE EOSINOPHIL (OHS): 0.03 K/UL
ABSOLUTE MONOCYTE (OHS): 1.04 K/UL (ref 0.3–1)
ABSOLUTE NEUTROPHIL COUNT (OHS): 6.03 K/UL (ref 1.8–7.7)
ANION GAP (OHS): 13 MMOL/L (ref 8–16)
BASOPHILS # BLD AUTO: 0.01 K/UL
BASOPHILS NFR BLD AUTO: 0.1 %
BUN SERPL-MCNC: 10 MG/DL (ref 8–23)
CALCIUM SERPL-MCNC: 7.4 MG/DL (ref 8.7–10.5)
CHLORIDE SERPL-SCNC: 106 MMOL/L (ref 95–110)
CO2 SERPL-SCNC: 16 MMOL/L (ref 23–29)
CREAT SERPL-MCNC: 0.7 MG/DL (ref 0.5–1.4)
ERYTHROCYTE [DISTWIDTH] IN BLOOD BY AUTOMATED COUNT: 13.8 % (ref 11.5–14.5)
GFR SERPLBLD CREATININE-BSD FMLA CKD-EPI: >60 ML/MIN/1.73/M2
GLUCOSE SERPL-MCNC: 124 MG/DL (ref 70–110)
HCT VFR BLD AUTO: 27.4 % (ref 40–54)
HGB BLD-MCNC: 8.6 GM/DL (ref 14–18)
IMM GRANULOCYTES # BLD AUTO: 0.04 K/UL (ref 0–0.04)
IMM GRANULOCYTES NFR BLD AUTO: 0.5 % (ref 0–0.5)
LYMPHOCYTES # BLD AUTO: 1.35 K/UL (ref 1–4.8)
MCH RBC QN AUTO: 28.9 PG (ref 27–31)
MCHC RBC AUTO-ENTMCNC: 31.4 G/DL (ref 32–36)
MCV RBC AUTO: 92 FL (ref 82–98)
NUCLEATED RBC (/100WBC) (OHS): 0 /100 WBC
PLATELET # BLD AUTO: 106 K/UL (ref 150–450)
PMV BLD AUTO: 12.3 FL (ref 9.2–12.9)
POCT GLUCOSE: 120 MG/DL (ref 70–110)
POCT GLUCOSE: 181 MG/DL (ref 70–110)
POCT GLUCOSE: 192 MG/DL (ref 70–110)
POCT GLUCOSE: 206 MG/DL (ref 70–110)
POCT GLUCOSE: 211 MG/DL (ref 70–110)
POTASSIUM SERPL-SCNC: 4.2 MMOL/L (ref 3.5–5.1)
RBC # BLD AUTO: 2.98 M/UL (ref 4.6–6.2)
RELATIVE EOSINOPHIL (OHS): 0.4 %
RELATIVE LYMPHOCYTE (OHS): 15.9 % (ref 18–48)
RELATIVE MONOCYTE (OHS): 12.2 % (ref 4–15)
RELATIVE NEUTROPHIL (OHS): 70.9 % (ref 38–73)
SODIUM SERPL-SCNC: 135 MMOL/L (ref 136–145)
WBC # BLD AUTO: 8.5 K/UL (ref 3.9–12.7)

## 2025-05-16 PROCEDURE — 99900035 HC TECH TIME PER 15 MIN (STAT)

## 2025-05-16 PROCEDURE — 97161 PT EVAL LOW COMPLEX 20 MIN: CPT

## 2025-05-16 PROCEDURE — 36415 COLL VENOUS BLD VENIPUNCTURE: CPT | Performed by: NEUROLOGICAL SURGERY

## 2025-05-16 PROCEDURE — 80048 BASIC METABOLIC PNL TOTAL CA: CPT | Performed by: NEUROLOGICAL SURGERY

## 2025-05-16 PROCEDURE — 11000001 HC ACUTE MED/SURG PRIVATE ROOM

## 2025-05-16 PROCEDURE — 97535 SELF CARE MNGMENT TRAINING: CPT

## 2025-05-16 PROCEDURE — 25000003 PHARM REV CODE 250: Performed by: NURSE PRACTITIONER

## 2025-05-16 PROCEDURE — 97116 GAIT TRAINING THERAPY: CPT

## 2025-05-16 PROCEDURE — 97165 OT EVAL LOW COMPLEX 30 MIN: CPT

## 2025-05-16 PROCEDURE — 97530 THERAPEUTIC ACTIVITIES: CPT

## 2025-05-16 PROCEDURE — 85025 COMPLETE CBC W/AUTO DIFF WBC: CPT | Performed by: NEUROLOGICAL SURGERY

## 2025-05-16 PROCEDURE — 94761 N-INVAS EAR/PLS OXIMETRY MLT: CPT

## 2025-05-16 PROCEDURE — 63600175 PHARM REV CODE 636 W HCPCS: Performed by: NEUROLOGICAL SURGERY

## 2025-05-16 PROCEDURE — 80197 ASSAY OF TACROLIMUS: CPT | Performed by: NURSE PRACTITIONER

## 2025-05-16 PROCEDURE — 36415 COLL VENOUS BLD VENIPUNCTURE: CPT | Performed by: NURSE PRACTITIONER

## 2025-05-16 PROCEDURE — 94799 UNLISTED PULMONARY SVC/PX: CPT

## 2025-05-16 PROCEDURE — 25000003 PHARM REV CODE 250: Performed by: NEUROLOGICAL SURGERY

## 2025-05-16 RX ORDER — LISINOPRIL 20 MG/1
20 TABLET ORAL DAILY
Status: DISCONTINUED | OUTPATIENT
Start: 2025-05-16 | End: 2025-05-23 | Stop reason: HOSPADM

## 2025-05-16 RX ORDER — GABAPENTIN 300 MG/1
900 CAPSULE ORAL 3 TIMES DAILY
Status: DISCONTINUED | OUTPATIENT
Start: 2025-05-16 | End: 2025-05-23 | Stop reason: HOSPADM

## 2025-05-16 RX ORDER — ATORVASTATIN CALCIUM 40 MG/1
40 TABLET, FILM COATED ORAL DAILY
Status: DISCONTINUED | OUTPATIENT
Start: 2025-05-16 | End: 2025-05-23 | Stop reason: HOSPADM

## 2025-05-16 RX ADMIN — HYDROMORPHONE HYDROCHLORIDE 2 MG: 2 INJECTION INTRAMUSCULAR; INTRAVENOUS; SUBCUTANEOUS at 08:05

## 2025-05-16 RX ADMIN — ALLOPURINOL 100 MG: 100 TABLET ORAL at 08:05

## 2025-05-16 RX ADMIN — METHOCARBAMOL 750 MG: 750 TABLET ORAL at 02:05

## 2025-05-16 RX ADMIN — HYDROMORPHONE HYDROCHLORIDE 2 MG: 2 INJECTION INTRAMUSCULAR; INTRAVENOUS; SUBCUTANEOUS at 01:05

## 2025-05-16 RX ADMIN — POLYETHYLENE GLYCOL 3350 17 G: 17 POWDER, FOR SOLUTION ORAL at 08:05

## 2025-05-16 RX ADMIN — SENNOSIDES AND DOCUSATE SODIUM 2 TABLET: 50; 8.6 TABLET ORAL at 08:05

## 2025-05-16 RX ADMIN — OXYCODONE 15 MG: 5 TABLET ORAL at 02:05

## 2025-05-16 RX ADMIN — GABAPENTIN 900 MG: 300 CAPSULE ORAL at 08:05

## 2025-05-16 RX ADMIN — ALUMINUM HYDROXIDE, MAGNESIUM HYDROXIDE, AND DIMETHICONE 30 ML: 200; 20; 200 SUSPENSION ORAL at 08:05

## 2025-05-16 RX ADMIN — CEFAZOLIN 2 G: 2 INJECTION, POWDER, FOR SOLUTION INTRAMUSCULAR; INTRAVENOUS at 12:05

## 2025-05-16 RX ADMIN — OXYCODONE 15 MG: 5 TABLET ORAL at 11:05

## 2025-05-16 RX ADMIN — INSULIN ASPART 20 UNITS: 100 INJECTION, SOLUTION INTRAVENOUS; SUBCUTANEOUS at 03:05

## 2025-05-16 RX ADMIN — OXYCODONE 15 MG: 5 TABLET ORAL at 07:05

## 2025-05-16 RX ADMIN — ATORVASTATIN CALCIUM 40 MG: 40 TABLET, FILM COATED ORAL at 01:05

## 2025-05-16 RX ADMIN — TRAZODONE HYDROCHLORIDE 50 MG: 50 TABLET ORAL at 08:05

## 2025-05-16 RX ADMIN — TACROLIMUS 2 MG: 1 CAPSULE ORAL at 08:05

## 2025-05-16 RX ADMIN — INSULIN GLARGINE 20 UNITS: 100 INJECTION, SOLUTION SUBCUTANEOUS at 08:05

## 2025-05-16 RX ADMIN — HYDROMORPHONE HYDROCHLORIDE 2 MG: 2 INJECTION INTRAMUSCULAR; INTRAVENOUS; SUBCUTANEOUS at 04:05

## 2025-05-16 RX ADMIN — GABAPENTIN 800 MG: 400 CAPSULE ORAL at 08:05

## 2025-05-16 RX ADMIN — METOPROLOL SUCCINATE 200 MG: 50 TABLET, EXTENDED RELEASE ORAL at 08:05

## 2025-05-16 RX ADMIN — LEVOTHYROXINE SODIUM 88 MCG: 88 TABLET ORAL at 08:05

## 2025-05-16 RX ADMIN — MUPIROCIN: 20 OINTMENT TOPICAL at 08:05

## 2025-05-16 RX ADMIN — ALUMINUM HYDROXIDE, MAGNESIUM HYDROXIDE, AND DIMETHICONE 30 ML: 200; 20; 200 SUSPENSION ORAL at 06:05

## 2025-05-16 RX ADMIN — TACROLIMUS 1 MG: 1 CAPSULE ORAL at 05:05

## 2025-05-16 RX ADMIN — HEPARIN SODIUM 5000 UNITS: 5000 INJECTION INTRAVENOUS; SUBCUTANEOUS at 08:05

## 2025-05-16 RX ADMIN — METHOCARBAMOL 750 MG: 750 TABLET ORAL at 08:05

## 2025-05-16 RX ADMIN — INSULIN ASPART 20 UNITS: 100 INJECTION, SOLUTION INTRAVENOUS; SUBCUTANEOUS at 07:05

## 2025-05-16 RX ADMIN — ACETAMINOPHEN 650 MG: 325 TABLET ORAL at 05:05

## 2025-05-16 RX ADMIN — ACETAMINOPHEN 650 MG: 325 TABLET ORAL at 11:05

## 2025-05-16 RX ADMIN — GABAPENTIN 900 MG: 300 CAPSULE ORAL at 02:05

## 2025-05-16 RX ADMIN — OXYCODONE 15 MG: 5 TABLET ORAL at 03:05

## 2025-05-16 RX ADMIN — ALUMINUM HYDROXIDE, MAGNESIUM HYDROXIDE, AND DIMETHICONE 30 ML: 200; 20; 200 SUSPENSION ORAL at 03:05

## 2025-05-16 RX ADMIN — ALUMINUM HYDROXIDE, MAGNESIUM HYDROXIDE, AND DIMETHICONE 30 ML: 200; 20; 200 SUSPENSION ORAL at 11:05

## 2025-05-16 RX ADMIN — LISINOPRIL 20 MG: 20 TABLET ORAL at 01:05

## 2025-05-16 RX ADMIN — ACETAMINOPHEN 650 MG: 325 TABLET ORAL at 06:05

## 2025-05-16 NOTE — HPI
Vick Gonzalez is a chronically ill 70-year-old male admitted to [facility] for adjacent segment disease of the lumbar spine.  The patient has a long complex history of chronic lower back pain and has developed neurogenic claudication.  He is followed in outpatient setting by Dr. Khadar Payne.  On today, the patient underwent Left L4-5 oblique interbody fusion, placement of interbody spacer, DePuy Conduit LLIF spacer filled with allograft BMP and DBM, L4-S1 posterior segmental instrumentation using DePuy Viper Prime system, and  L4-5 laminectomy, medial facetectomy, foraminotomy.  Due to the severity of the patient's underlying comorbidities, Hospital Medicine was consulted for medical management.

## 2025-05-16 NOTE — ASSESSMENT & PLAN NOTE
Patient's blood pressure range in the last 24 hours was: BP  Min: 156/74  Max: 184/91.The patient's inpatient anti-hypertensive regimen is listed below:  Current Antihypertensives  metoprolol succinate (TOPROL-XL) 24 hr tablet 200 mg, Daily, Oral    Plan  - BP is controlled, no changes needed to their regimen

## 2025-05-16 NOTE — NURSING
Sepsis pop-up initiated for Pt. Dr. Payne notified. MD also notified that bedside nurse placed urinary catheter orders back since Pt. Refused to remove catheter this morning and no further orders placed to remove catheter; MD aware. Plan of care continued.

## 2025-05-16 NOTE — PT/OT/SLP EVAL
Occupational Therapy   Evaluation    Name: Vick Gonzalez  MRN: [id number]  Admitting Diagnosis: Adjacent segment disease of lumbar spine with history of fusion procedure  Recent Surgery: Procedure(s) (LRB):  ARTHRODESIS, SPINE, LUMBAR, OLIF, MINIMALLY INVASIVE (N/A)  FACETECTOMY (N/A) 1 Day Post-Op    Recommendations:     Discharge Recommendations: High Intensity Therapy  Discharge Equipment Recommendations:  other (see comments) (TBD next level of care)  Barriers to discharge:  Inaccessible home environment    Assessment:     Vick Gonzalez is a 69 y.o. male with a medical diagnosis of Adjacent segment disease of lumbar spine with history of fusion procedure.  He presents with The primary encounter diagnosis was Adjacent segment disease of lumbar spine with history of fusion procedure. Diagnoses of Stage 3 chronic kidney disease, unspecified whether stage 3a or 3b CKD, Spinal stenosis of lumbar region with neurogenic claudication, and Type 2 diabetes mellitus with diabetic polyneuropathy, with long-term current use of insulin were also pertinent to this visit. Performance deficits affecting function: weakness, impaired balance, decreased safety awareness, impaired skin, impaired endurance, pain, impaired sensation, decreased coordination, decreased ROM, impaired functional mobility, decreased upper extremity function, impaired coordination, gait instability, decreased lower extremity function.      Rehab Prognosis: Good; patient would benefit from acute skilled OT services to address these deficits and reach maximum level of function.       Plan:     Patient to be seen 5 x/week to address the above listed problems via self-care/home management, therapeutic activities, therapeutic exercises  Plan of Care Expires: 06/16/25  Plan of Care Reviewed with: patient    Subjective     Chief Complaint: Pain to his back and neck.   Patient/Family Comments/goals: Improve strength/endurance.     Occupational Profile:  Living  Environment: Patient lives w/ girlfriend in 2nd floor apartment, ~[address] 2nd floor w/ R HR, T/S.  Previous level of function: Patient reports being mod I for ADLs and using SPC for short distances.   Roles and Routines: Retired , drives   Equipment Used at Home: cane, straight  Assistance upon Discharge: Girlfriend    Pain/Comfort:  Pain Rating 1: 8/10  Location 1:  (back and neck)  Pain Addressed 1: Reposition, Distraction, Pre-medicate for activity  Pain Rating 2: 9/10  Location 2:  (back and neck)  Pain Addressed 2: Reposition, Distraction    Patients cultural, spiritual, Scientologist conflicts given the current situation: no    Objective:     Communicated with: nsg prior to session.  Patient found supine with telemetry, peripheral IV, tierney catheter, BILL drain upon OT entry to room.    General Precautions: Standard, fall  Orthopedic Precautions: spinal precautions  Braces: LSO  Respiratory Status: Room air    Occupational Performance:    Bed Mobility:    Patient completed Rolling/Turning to Right with maximal assistance via log roll technique.  Patient completed Scooting EOB with maximal assistance and 2 persons and progressing to CGA x1  Patient completed Supine to Sit with maximal assistance and 2 persons for trunk and BLE management to ensure spinal precautions are maintained.   Patient completed Sit to Supine with maximal assistance and 2 persons trunk and BLE management to ensure spinal precautions are maintained.     Functional Mobility/Transfers:  Patient completed Sit <> Stand Transfer with maximal assistance x2 persons  with  rolling walker and height of bed elevated. Pt requires verbal cues for proper hand placement using RW. Patient unable to stand fully upright, and demonstrates forward/flexed posture.   Patient completed sit<>stand with moderate assistance x2 persons using rolling walker and height of bed elevated. Patient demonstrates forward,flexed posture in stance, but able to push through  his BUEs and stand upright. Once in stance, patient report BLE weakness and buckling noted.    Patient performs 2 side steps toward HOB min A x2 using RW.   Functional Mobility: Unsafe to progress functional mobility this date 2/2 BLE weakness/knee buckling.     Activities of Daily Living:  Upper Body Dressing: moderate assistance to fausto/doff LSO seated EOB, requiring verbal cues for step-by-step sequencing.   Lower Body Dressing: total assistance to fausto/doff socks seated EOB.   Toileting: tierney catheter.  \    Cognitive/Visual Perceptual:  Cognitive/Psychosocial Skills:     -       Oriented to: Person, Place, and Situation   -       Follows Commands/attention:Follows one-step commands  -       Communication: clear/fluent  -       Safety awareness/insight to disability: impaired   -       Mood/Affect/Coping skills/emotional control: Appropriate to situation, Cooperative, and Pleasant    Physical Exam:  Balance:    -       Sitting balance: Max initially 2/2 pain, but progresses to CGA.   Postural examination/scapula alignment:    -       Rounded shoulders  -       Forward head  Skin integrity: lumbar incisions w/ BILL drain; drainage noted. MD present to assess.   Sensation:    -       Impaired  light/touch to B hands.   Upper Extremity Range of Motion:     -       Right Upper Extremity: shoulder flexion AROM ~90 degrees; additional planes WFL.   -       Left Upper Extremity: shoulder flexion AROM ~90 degrees; additonal planes WFL.  Upper Extremity Strength:    -       Right Upper Extremity: Deficits: 2/5  -       Left Upper Extremity: Deficits: 2+-3/5   Strength:    -       Right Upper Extremity: NWFL; R<L  -       Left Upper Extremity: NWFL  Fine Motor Coordination:    -       Impaired Left hand, finger to nose, and Right hand, finger to nose 2/2 poor shoulder shoulder flexion and over/undershooting   -       Patient noted w/ difficulty manipulating LSO straps 2/2 poor FM skills/hand  coordination/strength/dexterity.    -       Patient reports difficulty w/ FM skills/object manipulation at baseline.     [facility] 6 Click ADL:  AMPAC Total Score: 16    Treatment & Education:  Pt educated on purpose/role of OT.   Pt performs bed mobility mod-max x2, w/ max verbal cues for log roll technique.   Patient requires mod A to fausto/doff TLSO.   Pt performs functional mobility mod-max A x2 using RW.   Pt noted w/ forward/flexed posture and BLE weakness; one episode of knee buckling noted. Patient unable to safely progress functional mobility.   Patient educated on spinal precautions and wearing LSO when OOB. Pt able to recall 2/3 spinal precautions when prompted.      Despite patient's co-morbidities, there is an expectation of returning to prior level of function to maintain independence thus avoiding readmission.  Patient's clinical condition meets full high intensity therapy criteria; including the ability to actively participate in 3 hours of therapy.  A lower level of care cannot provide the interdisciplinary treatment approach needed.    Patient left HOB elevated with all lines intact, call button in reach, bed alarm on, and nsg notified    GOALS:   Multidisciplinary Problems       Occupational Therapy Goals          Problem: Occupational Therapy    Goal Priority Disciplines Outcome Interventions   Occupational Therapy Goal     OT, PT/OT Progressing    Description: Goals to be met by: 6/16/25     Patient will increase functional independence with ADLs by performing:    LE Dressing with Stand-by Assistance using reacher.  Grooming while EOB with Set-up Assistance.  Toileting from toilet with Minimal Assistance for hygiene and clothing management.   Rolling to Bilateral with Stand-by Assistance.   Supine to sit with Minimal Assistance.  Stand pivot transfers with Minimal Assistance.  Toilet transfer to bedside commode with Minimal Assistance.                           History:     Past Medical History:    Diagnosis Date    Acute congestive heart failure 5/12/2023    Anemia     Anxiety     Cataract     Chronic pain syndrome 07/13/2011    CKD (chronic kidney disease), stage III     Coronary artery disease involving native coronary artery of native heart with angina pectoris 8/24/2023    Diabetes mellitus type II, uncontrolled     Discitis of lumbosacral region 01/16/2015    ED (erectile dysfunction)     Encounter for blood transfusion     Genital herpes     Gout, arthritis     History of alcohol abuse     History of hepatitis C, s/p successful Rx w/ SVR24 (cure) - 5/2018 08/23/2011    Completed 24wks Epclusa + RBV w/SVR12 - 2/2018  -     History of positive PPD, treatment status unknown     Pulmonary granulomas, negative sputum cultures for AFB and indeterminate quantferon test    History of substance abuse     Hypertension     Hypothyroidism     Liver replaced by transplant 08/23/2011    DATE: 12/16/2013  LIVER BIOPSY : REASON:  hep C staging  PATHOLOGY COMMITTEE NOTE/PLAN: :grade  1 / stage 1        Pancreatitis 2016    Peptic ulcer disease     Pulmonary emphysema, unspecified emphysema type 5/26/2023         Past Surgical History:   Procedure Laterality Date    ANTERIOR CERVICAL DISCECTOMY W/ FUSION N/A 8/22/2022    Procedure: Procedure: ACDF 4-7 LOS: 4.0 Anesthesia: General Blood: Type & Screen Radiology: C-Arm Microscope: ------- SNS: EMG, SEP, MEP Brace: [place] Bed: Regular Bed, Shoulder Strap Headrest:------ Position: Supine Equipment: Depuy;  Surgeon: Titi Christian MD;  Location: [facility] OR;  Service: Neurosurgery;  Laterality: N/A;  Depuy  confirmed CW 8/19  Neuro monitoring confirmed CW 8/19    CARPAL TUNNEL RELEASE Left 10/29/2021    Procedure: RELEASE, CARPAL TUNNEL;  Surgeon: Jameson Alejo Jr., MD;  Location: [facility] OR;  Service: Orthopedics;  Laterality: Left;    CHOLECYSTECTOMY      CORONARY ANGIOGRAPHY N/A 8/2/2023    Procedure: ANGIOGRAM, CORONARY ARTERY;  Surgeon: Juan Vera MD;  Location:  [facility] CATH LAB/EP;  Service: Cardiology;  Laterality: N/A;    DECOMPRESSION OF CERVICAL SPINE BY ANTERIOR APPROACH WITH FUSION  8/22/2022    Procedure: DECOMPRESSION AND FUSION, SPINE, CERVICAL, ANTERIOR APPROACH;  Surgeon: Titi Christian MD;  Location: [facility] OR;  Service: Neurosurgery;;    FACETECTOMY OF VERTEBRA N/A 5/15/2025    Procedure: FACETECTOMY;  Surgeon: Khadar Payne MD;  Location: [facility] OR;  Service: Neurosurgery;  Laterality: N/A;  Procedure:L4-5 laminectomy,medial facectomy  Length of procedure:1.5 hours  LOS: 3 nights  Anesthesia;General  Blood:Type and screen  Radiology:C-arm  Brace:LSO  Bed:[address]  Position:Prone  Equipment:Depuy-Paco, Orthofix-Bone Stim    FUSION, SPINE, LUMBAR, OLIF, MINIMALLY INVASIVE N/A 5/15/2025    Procedure: ARTHRODESIS, SPINE, LUMBAR, OLIF, MINIMALLY INVASIVE;  Surgeon: Khadar Payne MD;  Location: [facility];  Service: Neurosurgery;  Laterality: N/A;  Procedure:L4-5 OLiF conduit spacer, anterior intrusmentation switch plate  Length of procedure: 1.5 hours  Anesthesia:General  Blood:Type and screen  Radiology:C-arm  Brace:[facility]  Bed:Flat top  Position:Lateral right down  Equipment:Depuy-Step    INJECTION OF JOINT Right 12/2/2019    Procedure: INJECTION, JOINT SI;  Surgeon: Thu Penn MD;  Location: [facility] PAIN MGT;  Service: Pain Management;  Laterality: Right;  RT SI JNT INJ    INSERTION OF SPINAL NEUROSTIMULATOR N/A 9/3/2024    Procedure: INSERTION, NEUROSTIMULATOR, SPINAL;  Surgeon: Khadar Payne MD;  Location: [facility] OR;  Service: Neurosurgery;  Laterality: N/A;  Procedure:C1-2 generator placement  Length of procedure:1.5 hours  LOS: 0-1 nights  Anesthesia:General  Blood:Type and screen  Radiology:C-arm  Bed:Regular Bed  Headrest:[place]  Position:Prone  Equipment:Giraldo-Doc Bob    LAMINECTOMY, SPINE, FOR NEUROSTIMULATOR ELECTRODE INSERTION N/A 8/29/2024    Procedure: LAMINECTOMY, SPINE, FOR NEUROSTIMULATOR ELECTRODE INSERTION;  Surgeon: Khadar Payne  MD NAVA;  Location: [facility] OR;  Service: Neurosurgery;  Laterality: N/A;  Procedure: C1-2 laminectomy for paddle lead placement for spinal cord stimulator trial, extension   Length of procedure: 2 hours  LOS: 0 nights  Anesthesia:General  Blood:Type and screen  Radiology:C-arm  SNS:EMG,SEP,MEP  Position:Pro    LEFT HEART CATHETERIZATION Left 8/2/2023    Procedure: Left heart cath;  Surgeon: Juan Vera MD;  Location: [facility] CATH LAB/EP;  Service: Cardiology;  Laterality: Left;    LIVER TRANSPLANT  06/2010    SPINE SURGERY      TRANSFORAMINAL EPIDURAL INJECTION OF STEROID Left 5/29/2023    Procedure: INJECTION, STEROID, EPIDURAL, TRANSFORAMINAL APPROACH,  LEFT C7-T1 DIRECT REF;  Surgeon: Thu Penn MD;  Location: [facility] PAIN MGT;  Service: Pain Management;  Laterality: Left;  4/3 MD ILL/PT WILL C/B       Time Tracking:     OT Date of Treatment: 05/16/25  OT Start Time: 0918  OT Stop Time: 0951  OT Total Time (min): 33 min    Billable Minutes:Evaluation 10  Self Care/Home Management 10  Therapeutic Activity 13    5/16/2025

## 2025-05-16 NOTE — ASSESSMENT & PLAN NOTE
Creatine stable for now. BMP reviewed- noted Estimated Creatinine Clearance: 138.8 mL/min (based on SCr of 0.7 mg/dL). according to latest data. Based on current GFR, CKD stage is stage 3 - GFR 30-59.  Monitor UOP and serial BMP and adjust therapy as needed. Renally dose meds. Avoid nephrotoxic medications and procedures.

## 2025-05-16 NOTE — PLAN OF CARE
CM met with pt at bedside to discuss discharge planning. He lives at home with spouse. Pt is independent with ADL's and able to drive. Pt has a straight cane and  services. Upon discharge, pts family member Cely Gonzalez (spouse) [phone]  will provide transport home. Pt made aware to contact CM with any questions or concerns. CM will continue to follow and assist with any discharge needs.    Future Appointments   Date Time Provider Department Center   5/22/2025 11:45 AM NOM OIC-US1 MASTER [facility] ULTR [facility]   5/30/2025  1:00 PM [facility] ODC XR-B LIMIT 500 LBS [facility] XRAY OP [facility]   5/30/2025  2:00 PM Paige Gilbert, PA-C [facility] NEUROSU [facility]   6/17/2025  8:30 AM LAB, [facility] [place]   6/23/2025  4:00 PM Emanuel Lopez MD [facility] FAM MED [place] [facility]   6/30/2025  9:00 AM LAB, [facility] [place]   7/1/2025  9:45 AM NOM OIC-XRAY [facility] XRAY [facility]   7/1/2025 10:30 AM Oralia Rowland, NP [facility] NEUROS8 [facility]   7/24/2025  3:20 PM Paddy Segundo, OD [facility] OPTOMTY [facility]   8/19/2025  9:00 AM [facility] ORTHO CLINIC LIMIT 350LBS [facility] ORTHXR [facility]   8/19/2025 10:00 AM Khadar Payne MD [facility] NEUROSU [facility]       [facility] - Med Surg  Initial Discharge Assessment       Primary Care Provider: Emanuel Lopez MD    Admission Diagnosis: Adjacent segment disease of lumbar spine with history of fusion procedure [M51.369, Z98.1]    Admission Date: 5/15/2025  Expected Discharge Date:     Transition of Care Barriers: (P) None    Payor: [employer] / Plan: [employer] [facility] SNP / Product Type: Medicare Advantage /     Extended Emergency Contact Information  Primary Emergency Contact: Cely Gonzalez  Address: [address] [place] [address] [place] [address] [facility]  Home Phone: [phone]  Work Phone: [phone]  Mobile Phone: [phone]  Relation: Spouse  Preferred language: English    Discharge Plan A: (P) Home, Home with  family (Outpt therapy)         [id number] - [place] - [address]  [address]  [place] [address]  Phone: [phone] Fax: [phone]    [facility] - [place] - [address]  [address]  [address]  [place] [address]  Phone: [phone] Fax: [phone]    [facility]  [address]  [place] [address]  Phone: [phone] Fax: [phone]    [facility] [place] [address] [facility] [address]  Phone: [phone] Fax: [phone]    [facility] - [place] - [address]  [address]  [place] [address]  Phone: [phone] Fax: [phone]    [id number] - [place] - [address] AT [facility] [address]  [address] [place] [address]  Phone: [phone] Fax: [phone]      Initial Assessment (most recent)       Adult Discharge Assessment - 05/16/25 1123          Discharge Assessment    Assessment Type Discharge Planning Assessment     Confirmed/corrected address, phone number and insurance Yes     Confirmed Demographics Correct on Facesheet     Source of Information patient     When was your last doctors appointment? 05/12/25     Communicated JOJO with patient/caregiver Date not available/Unable to determine     Reason For Admission Adjacent segment disease of lumbar spine with history of fusion procedure (P)      People in Home spouse (P)      Do you expect to return to your current living situation? Yes (P)      Do you have help at home or someone to help you manage your care at home? Yes (P)      Who are your caregiver(s) and their phone number(s)? Cely Gonzalez (spouse) [phone] (P)      Prior to hospitilization cognitive status: Alert/Oriented (P)      Current cognitive status: Alert/Oriented (P)      Walking or Climbing Stairs Difficulty yes (P)      Walking or Climbing Stairs ambulation difficulty, requires equipment  (P)      Mobility Management Cane (P)      Dressing/Bathing Difficulty no (P)      Equipment Currently Used at Home cane, straight (P)      Readmission within 30 days? No (P)      Patient currently being followed by outpatient case management? No (P)      Do you currently have service(s) that help you manage your care at home? No (P)      Do you take prescription medications? Yes (P)      Do you have prescription coverage? Yes (P)      Coverage PHN (P)      Do you have any problems affording any of your prescribed medications? No (P)      Is the patient taking medications as prescribed? yes (P)      Who is going to help you get home at discharge? Cely Gonzalez (spouse) [phone] (P)      How do you get to doctors appointments? car, drives self (P)      Are you on dialysis? No (P)      Do you take coumadin? No (P)      Discharge Plan A Home;Home with family (P)    Outpt therapy    DME Needed Upon Discharge  cane, quad (P)      Discharge Plan discussed with: Patient (P)      Transition of Care Barriers None (P)         Physical Activity    On average, how many days per week do you engage in moderate to strenuous exercise (like a brisk walk)? 3 days (P)      On average, how many minutes do you engage in exercise at this level? 60 min (P)         Financial Resource Strain    How hard is it for you to pay for the very basics like food, housing, medical care, and heating? Not hard at all (P)         Housing Stability    In the last 12 months, was there a time when you were not able to pay the mortgage or rent on time? No (P)      At any time in the past 12 months, were you homeless or living in a shelter (including now)? No (P)         Transportation Needs    In the past 12 months, has lack of transportation kept you from medical appointments or from getting medications? No (P)      In the past 12 months, has lack of transportation kept you from meetings, work, or from getting things needed for daily living? No (P)          Food Insecurity    Within the past 12 months, you worried that your food would run out before you got the money to buy more. Never true (P)      Within the past 12 months, the food you bought just didn't last and you didn't have money to get more. Never true (P)         Stress    Do you feel stress - tense, restless, nervous, or anxious, or unable to sleep at night because your mind is troubled all the time - these days? Not at all (P)         Social Isolation    How often do you feel lonely or isolated from those around you?  Never (P)         Alcohol Use    Q1: How often do you have a drink containing alcohol? Never (P)      Q2: How many drinks containing alcohol do you have on a typical day when you are drinking? Patient does not drink (P)      Q3: How often do you have six or more drinks on one occasion? Never (P)         Utilities    In the past 12 months has the electric, gas, oil, or water company threatened to shut off services in your home? No (P)         Health Literacy    How often do you need to have someone help you when you read instructions, pamphlets, or other written material from your doctor or pharmacy? Never (P)         Social Connections    In a typical week, how many times do you talk on the phone with family, friends, or neighbors? More than three times a week (P)      How often do you get together with friends or relatives? More than three times a week (P)      How often do you attend Jainism or Synagogue services? More than 4 times per year (P)      Do you belong to any clubs or organizations such as Jainism groups, unions, fraternal or athletic groups, or school groups? No (P)      How often do you attend meetings of the clubs or organizations you belong to? Never (P)      Are you , , , , never , or living with a partner?  (P)         OTHER    Name(s) of People in Home Cely Gonzalez (spouse) [phone] (P)

## 2025-05-16 NOTE — PLAN OF CARE
Co-evaluation performed w/ PT 2/2 suspected complexity of the patient. Patient w/ spinal px and LSO when OOB. Pt performs bed mobility mod-max x2, w/ max verbal cues for log roll technique. Patient requires mod A to fausto/doff TLSO. Pt performs functional mobility mod-max A x2 using RW. Pt noted w/ forward/flexed posture and BLE weakness; one episode of knee buckling noted. Patient unable to safely progress functional mobility.     Despite patient's co-morbidities, there is an expectation of returning to prior level of function to maintain independence thus avoiding readmission.  Patient's clinical condition meets full high intensity therapy criteria; including the ability to actively participate in 3 hours of therapy.  A lower level of care cannot provide the interdisciplinary treatment approach needed.      Problem: Occupational Therapy  Goal: Occupational Therapy Goal  Description: Goals to be met by: 6/16/25     Patient will increase functional independence with ADLs by performing:    LE Dressing with Stand-by Assistance using reacher.  Grooming while EOB with Set-up Assistance.  Toileting from toilet with Minimal Assistance for hygiene and clothing management.   Rolling to Bilateral with Stand-by Assistance.   Supine to sit with Minimal Assistance.  Stand pivot transfers with Minimal Assistance.  Toilet transfer to bedside commode with Minimal Assistance.    Outcome: Progressing

## 2025-05-16 NOTE — SUBJECTIVE & OBJECTIVE
Past Medical History:   Diagnosis Date    Acute congestive heart failure 5/12/2023    Anemia     Anxiety     Cataract     Chronic pain syndrome 07/13/2011    CKD (chronic kidney disease), stage III     Coronary artery disease involving native coronary artery of native heart with angina pectoris 8/24/2023    Diabetes mellitus type II, uncontrolled     Discitis of lumbosacral region 01/16/2015    ED (erectile dysfunction)     Encounter for blood transfusion     Genital herpes     Gout, arthritis     History of alcohol abuse     History of hepatitis C, s/p successful Rx w/ SVR24 (cure) - 5/2018 08/23/2011    Completed 24wks Epclusa + RBV w/SVR12 - 2/2018  -     History of positive PPD, treatment status unknown     Pulmonary granulomas, negative sputum cultures for AFB and indeterminate quantferon test    History of substance abuse     Hypertension     Hypothyroidism     Liver replaced by transplant 08/23/2011    DATE: 12/16/2013  LIVER BIOPSY : REASON:  hep C staging  PATHOLOGY COMMITTEE NOTE/PLAN: :grade  1 / stage 1        Pancreatitis 2016    Peptic ulcer disease     Pulmonary emphysema, unspecified emphysema type 5/26/2023       Past Surgical History:   Procedure Laterality Date    ANTERIOR CERVICAL DISCECTOMY W/ FUSION N/A 8/22/2022    Procedure: Procedure: ACDF 4-7 LOS: 4.0 Anesthesia: General Blood: Type & Screen Radiology: C-Arm Microscope: ------- SNS: EMG, SEP, MEP Brace: [place] Bed: Regular Bed, Shoulder Strap Headrest:------ Position: Supine Equipment: Depuy;  Surgeon: Titi Christian MD;  Location: [facility] OR;  Service: Neurosurgery;  Laterality: N/A;  Depuy  confirmed CW 8/19  Neuro monitoring confirmed CW 8/19    CARPAL TUNNEL RELEASE Left 10/29/2021    Procedure: RELEASE, CARPAL TUNNEL;  Surgeon: Jameson Alejo Jr., MD;  Location: [facility] OR;  Service: Orthopedics;  Laterality: Left;    CHOLECYSTECTOMY      CORONARY ANGIOGRAPHY N/A 8/2/2023    Procedure: ANGIOGRAM, CORONARY ARTERY;  Surgeon: Juan Vera  MD ODILIA;  Location: [facility] CATH LAB/EP;  Service: Cardiology;  Laterality: N/A;    DECOMPRESSION OF CERVICAL SPINE BY ANTERIOR APPROACH WITH FUSION  8/22/2022    Procedure: DECOMPRESSION AND FUSION, SPINE, CERVICAL, ANTERIOR APPROACH;  Surgeon: Titi Christian MD;  Location: [facility] OR;  Service: Neurosurgery;;    INJECTION OF JOINT Right 12/2/2019    Procedure: INJECTION, JOINT SI;  Surgeon: Thu Penn MD;  Location: [facility] PAIN MGT;  Service: Pain Management;  Laterality: Right;  RT SI JNT INJ    INSERTION OF SPINAL NEUROSTIMULATOR N/A 9/3/2024    Procedure: INSERTION, NEUROSTIMULATOR, SPINAL;  Surgeon: Khadar Payne MD;  Location: [facility] OR;  Service: Neurosurgery;  Laterality: N/A;  Procedure:C1-2 generator placement  Length of procedure:1.5 hours  LOS: 0-1 nights  Anesthesia:General  Blood:Type and screen  Radiology:C-arm  Bed:Regular Bed  Headrest:[place]  Position:Prone  Equipment:Giraldo-Doc Bob    LAMINECTOMY, SPINE, FOR NEUROSTIMULATOR ELECTRODE INSERTION N/A 8/29/2024    Procedure: LAMINECTOMY, SPINE, FOR NEUROSTIMULATOR ELECTRODE INSERTION;  Surgeon: Khadar Payne MD;  Location: [facility] OR;  Service: Neurosurgery;  Laterality: N/A;  Procedure: C1-2 laminectomy for paddle lead placement for spinal cord stimulator trial, extension   Length of procedure: 2 hours  LOS: 0 nights  Anesthesia:General  Blood:Type and screen  Radiology:C-arm  SNS:EMG,SEP,MEP  Position:Pro    LEFT HEART CATHETERIZATION Left 8/2/2023    Procedure: Left heart cath;  Surgeon: Juan Vera MD;  Location: [facility] CATH LAB/EP;  Service: Cardiology;  Laterality: Left;    LIVER TRANSPLANT  06/2010    SPINE SURGERY      TRANSFORAMINAL EPIDURAL INJECTION OF STEROID Left 5/29/2023    Procedure: INJECTION, STEROID, EPIDURAL, TRANSFORAMINAL APPROACH,  LEFT C7-T1 DIRECT REF;  Surgeon: Thu Penn MD;  Location: [facility] PAIN MGT;  Service: Pain Management;  Laterality: Left;  4/3 MD ILL/PT WILL C/B       Review of patient's allergies  "indicates:  No Known Allergies    No current facility-administered medications on file prior to encounter.     Current Outpatient Medications on File Prior to Encounter   Medication Sig    allopurinoL (ZYLOPRIM) 100 MG tablet TAKE 1 TABLET BY MOUTH TWICE A DAY    aluminum-magnesium hydroxide-simethicone (MAALOX) 200-200-20 mg/5 mL Susp Take 30 mLs by mouth 4 (four) times daily before meals and nightly.    aspirin (ECOTRIN) 81 MG EC tablet Take 1 tablet (81 mg total) by mouth once daily.    atorvastatin (LIPITOR) 40 MG tablet Take 1 tablet (40 mg total) by mouth once daily.    calcium carbonate-vitamin D3 (CALCIUM 600 WITH VITAMIN D3) 600 mg(1,500mg) -400 unit Chew Take 1 tablet by mouth once daily.     cyanocobalamin (VITAMIN B-12) 100 MCG tablet Take 100 mcg by mouth once daily.    diphenhydrAMINE (BENADRYL) 25 mg capsule Take 25 mg by mouth daily as needed for Allergies (Cold).    gabapentin (NEURONTIN) 800 MG tablet TAKE 1 TABLET BY MOUTH THREE TIMES A DAY    insulin glargine U-300 conc (TOUJEO MAX U-300 SOLOSTAR) 300 unit/mL (3 mL) insulin pen Inject 40 Units into the skin once daily.    levothyroxine (SYNTHROID) 88 MCG tablet TAKE 1 TABLET BY MOUTH EVERY DAY    lisinopriL (PRINIVIL,ZESTRIL) 20 MG tablet TAKE 1 TABLET BY MOUTH EVERY DAY    methocarbamoL (ROBAXIN) 750 MG Tab Take 1 tablet (750 mg total) by mouth 3 (three) times daily as needed (muscle spasms).    metoprolol succinate (TOPROL-XL) 200 MG 24 hr tablet Take 1 tablet (200 mg total) by mouth once daily.    multivit with min-folic acid (MEN'S MULTIVITAMIN GUMMIES) 200 mcg Chew Take 1 tablet by mouth once daily.    NOVOFINE PLUS 32 gauge x 1/6" Ndle     papaverine 30 mg/mL injection Tri-Mix - PGE (alprostadil) 10mcg, papavarine 30mg, phentolamine 1mg; dispense 5mL vial, typical starting is 0.2 mL which is equal to 20 units (Patient taking differently: as needed. Tri-Mix - PGE (alprostadil) 10mcg, papavarine 30mg, phentolamine 1mg; dispense 5mL vial, " typical starting is 0.2 mL which is equal to 20 units)    polyethylene glycol (MIRALAX) 17 gram PwPk Take 17 g by mouth once daily.    sildenafiL (VIAGRA) 100 MG tablet Take 1 tablet (100 mg total) by mouth daily as needed for Erectile Dysfunction.    tacrolimus (PROGRAF) 1 MG Cap Take 2 capsules (2 mg total) by mouth every morning AND 1 capsule (1 mg total) every evening.    traZODone (DESYREL) 50 MG tablet TAKE 1 TABLET BY MOUTH EVERY DAY IN THE EVENING    TRUE METRIX GLUCOSE TEST STRIP Strp USE 3 TIMES DAILY TO TEST BLOOD GLUCOSE LEVEL    blood-glucose meter Misc 1 Device by Misc.(Non-Drug; Combo Route) route 3 (three) times daily.    blood-glucose meter,continuous (DEXCOM G7 ) Misc Use as directed.    blood-glucose sensor (DEXCOM G7 SENSOR) Viviane Use as directed.    blood-glucose transmitter (DEXCOM G6 TRANSMITTER) Viviane Use as directed    furosemide (LASIX) 20 MG tablet Take 2 tablets (40 mg total) by mouth daily as needed (For Weight Gain > 2-3 lbs in 1 day or 4-6 lbs over 1 week notify PCP and take 40 mg daily for three days).    insulin lispro (HUMALOG KWIKPEN INSULIN) 100 unit/mL pen Inject 20 Units into the skin 3 (three) times daily with meals.    lancets 30 gauge Misc 1 lancet by Misc.(Non-Drug; Combo Route) route 4 (four) times daily before meals and nightly.    [] oxyCODONE-acetaminophen (PERCOCET)  mg per tablet Take 1 tablet by mouth every 8 (eight) hours as needed for Pain. Quantity greater than 7 day supply medically necessary    tirzepatide (MOUNJARO) 2.5 mg/0.5 mL PnIj Inject 2.5 mg into the skin every 7 days.    [DISCONTINUED] insulin aspart U-100 (NOVOLOG FLEXPEN U-100 INSULIN) 100 unit/mL (3 mL) InPn pen Inject 20 Units into the skin 3 (three) times daily with meals.     Family History       Problem Relation (Age of Onset)    Cancer Mother, Maternal Uncle (82)    Diabetes Mother, Sister    Drug abuse Daughter    Heart disease Mother          Tobacco Use    Smoking status:  Former     Passive exposure: Never    Smokeless tobacco: Never   Substance and Sexual Activity    Alcohol use: No     Comment: over 5 years ago, none currently    Drug use: Not Currently     Comment: Former cocaine use    Sexual activity: Yes     Review of Systems   Constitutional: Negative.    HENT: Negative.     Eyes: Negative.    Respiratory: Negative.     Cardiovascular: Negative.    Gastrointestinal: Negative.    Endocrine: Negative.    Genitourinary: Negative.    Musculoskeletal:  Positive for back pain.   Skin: Negative.    Allergic/Immunologic: Negative.    Neurological: Negative.    Hematological: Negative.    Psychiatric/Behavioral: Negative.       Objective:     Vital Signs (Most Recent):  Temp: 97.5 °F (36.4 °C) (05/15/25 1559)  Pulse: 100 (05/15/25 1747)  Resp: 18 (05/15/25 1745)  BP: (!) 168/83 (05/15/25 1747)  SpO2: 98 % (05/15/25 1559) Vital Signs (24h Range):  Temp:  [97.5 °F (36.4 °C)-98.8 °F (37.1 °C)] 97.5 °F (36.4 °C)  Pulse:  [] 100  Resp:  [12-18] 18  SpO2:  [98 %-99 %] 98 %  BP: (156-184)/(74-91) 168/83     Weight: 116.1 kg (255 lb 15.3 oz)  Body mass index is 32.86 kg/m².     Physical Exam  HENT:      Head: Normocephalic.      Nose: Nose normal.      Mouth/Throat:      Mouth: Mucous membranes are moist.   Eyes:      Extraocular Movements: Extraocular movements intact.      Conjunctiva/sclera: Conjunctivae normal.      Pupils: Pupils are equal, round, and reactive to light.   Cardiovascular:      Rate and Rhythm: Normal rate.   Pulmonary:      Effort: Pulmonary effort is normal.      Breath sounds: Normal breath sounds.   Abdominal:      General: Abdomen is flat.   Musculoskeletal:         General: Normal range of motion.      Cervical back: Normal range of motion and neck supple.   Skin:     General: Skin is warm.      Capillary Refill: Capillary refill takes less than 2 seconds.      Comments: Surgical incision to mid-back and left flank; BILL drain appreciated.   Neurological:       General: No focal deficit present.      Mental Status: He is alert.   Psychiatric:         Mood and Affect: Mood normal.         Behavior: Behavior normal.         Thought Content: Thought content normal.         Judgment: Judgment normal.          Significant Labs: All pertinent labs within the past 24 hours have been reviewed.  Recent Lab Results         05/15/25  1649   05/15/25  1438   05/15/25  0711   05/15/25  0702        Group & Rh       B POS       INDIRECT WENDY       NEG       POCT Glucose 228   216   122         Specimen Outdate       05/18/2025 23:59               Significant Imaging: I have reviewed all pertinent imaging results/findings within the past 24 hours.  I have reviewed and interpreted all pertinent imaging results/findings within the past 24 hours.

## 2025-05-16 NOTE — NURSING
Report received from TONY Reed. Patient is AAOx4. On room air. Family members at the bedside.BILL drain is patent. LR IV infusing at 75 mL/hr.  All safety precautions secured. On going monitoring.

## 2025-05-16 NOTE — ASSESSMENT & PLAN NOTE
Patient's blood pressure range in the last 24 hours was: BP  Min: 125/69  Max: 181/90.The patient's inpatient anti-hypertensive regimen is listed below:  Current Antihypertensives  metoprolol succinate (TOPROL-XL) 24 hr tablet 200 mg, Daily, Oral  lisinopriL tablet 20 mg, Daily, Oral    Plan  - BP is controlled, no changes needed to their regimen

## 2025-05-16 NOTE — SUBJECTIVE & OBJECTIVE
Interval History:  Seen at the bedside.  No distress noted.  Patient is reporting an attempt pain to his back as well as numbness to his left upper extremity which he states has been there since before his surgery.  We will continue to follow along with the primary team to monitor medical management.  Pain regimen on board.    Review of Systems   Constitutional:  Positive for activity change and fever.   HENT:  Negative for trouble swallowing.    Musculoskeletal:  Positive for arthralgias, back pain, gait problem, myalgias and neck pain.   Neurological:  Positive for numbness (Chronic).   Psychiatric/Behavioral:  Negative for confusion.      Objective:     Vital Signs (Most Recent):  Temp: (!) 100.6 °F (38.1 °C) (05/16/25 1115)  Pulse: 98 (05/16/25 1144)  Resp: 18 (05/16/25 1144)  BP: (!) 155/87 (05/16/25 1115)  SpO2: 98 % (05/16/25 1144) Vital Signs (24h Range):  Temp:  [97.5 °F (36.4 °C)-100.6 °F (38.1 °C)] 100.6 °F (38.1 °C)  Pulse:  [] 98  Resp:  [12-20] 18  SpO2:  [91 %-99 %] 98 %  BP: (125-181)/(65-90) 155/87     Weight: 116.1 kg (255 lb 15.3 oz)  Body mass index is 32.86 kg/m².    Intake/Output Summary (Last 24 hours) at 5/16/2025 1325  Last data filed at 5/16/2025 0710  Gross per 24 hour   Intake 1890.37 ml   Output 830 ml   Net 1060.37 ml         Physical Exam  Vitals and nursing note reviewed.   Constitutional:       Appearance: He is ill-appearing.   HENT:      Mouth/Throat:      Mouth: Mucous membranes are moist.   Eyes:      Extraocular Movements: Extraocular movements intact.   Cardiovascular:      Rate and Rhythm: Normal rate and regular rhythm.   Pulmonary:      Effort: Pulmonary effort is normal.   Neurological:      Mental Status: He is alert.      Motor: Weakness present.               Significant Labs: All pertinent labs within the past 24 hours have been reviewed.    Significant Imaging: I have reviewed all pertinent imaging results/findings within the past 24 hours.

## 2025-05-16 NOTE — PT/OT/SLP EVAL
Physical Therapy Evaluation    Patient Name:  Vick Gonzalez   MRN:  [id number]    Recommendations:     Discharge Recommendations: High Intensity Therapy   Discharge Equipment Recommendations: to be determined by next level of care, walker, rolling   Barriers to discharge: Inaccessible home    Assessment:     Vick Gonzalez is a 69 y.o. male admitted with a medical diagnosis of Adjacent segment disease of lumbar spine with history of fusion procedure.  He presents with the following impairments/functional limitations: weakness, impaired endurance, impaired sensation, impaired self care skills, impaired functional mobility, gait instability, impaired balance, decreased coordination, decreased upper extremity function, decreased lower extremity function, pain, decreased safety awareness, impaired fine motor, impaired skin, orthopedic precautions Pt cooperative throughout PT/OT co-evaluation despite expressing significant pain in cervical region, L UE, and back incision areas.  Pt req'd max assist x 2 for bed mobility and txfs at this time.  Amb ~2 side steps w RW at bedside with min/mod assist x 2 for safety w LSO intact for activity.  Rec continued participation with acute PT/OT services and rec High Intensity Therapy upon discharge to next level of care to reach maximum level of potential recovery to independence again as well as be able to navigate the flight of stairs necessary to enter his apartment.  Despite patient's co-morbidities, there is an expectation of returning to prior level of function to maintain independence thus avoiding readmission.  Patient's clinical condition meets full high intensity therapy criteria; including the ability to actively participate in 3 hours of therapy.  A lower level of care cannot provide the interdisciplinary treatment approach needed.    .    Rehab Prognosis: Good; patient would benefit from acute skilled PT services to address these deficits and reach maximum level of  function.    Recent Surgery: Procedure(s) (LRB):  ARTHRODESIS, SPINE, LUMBAR, OLIF, MINIMALLY INVASIVE (N/A)  FACETECTOMY (N/A) 1 Day Post-Op    Plan:     During this hospitalization, patient to be seen daily to address the identified rehab impairments via gait training, therapeutic activities, therapeutic exercises, neuromuscular re-education and progress toward the following goals:    Plan of Care Expires:  06/16/25    Subjective     Chief Complaint: c/o sig pain in c-spine, L UE, and back sx sites, 8-9/10 , w reports increasing pain with activity  Patient/Family Comments/goals: PLOF     be independent with everything again  Pain/Comfort:  Pain Rating 1: 8/10  Location - Side 1: Bilateral  Location - Orientation 1: generalized  Location 1: back (C spine, LUE, back sx areas)  Pain Addressed 1: Pre-medicate for activity, Reposition, Distraction, Cessation of Activity, Nurse notified  Pain Rating Post-Intervention 1: 8/10 (reporting pain increased with activity)    Patients cultural, spiritual, Yazidi conflicts given the current situation: no    Living Environment:  Pt lives with girlfriend in 2nd floor apartment with only flight of stairs to enter, NO elevator.  Pt ambulates household and limited community distances with single point cane due to pain issues.  Pt + .  Pt independent with ADL.    Equipment used at home: cane, straight.    Upon discharge, patient will have assistance from girlfriend as able.    Objective:     Communicated with nsg prior to session.  Patient found supine with bed alarm, SCD, peripheral IV  upon PT entry to room.    General Precautions: Standard, fall  Orthopedic Precautions:spinal precautions   Braces: LSO  Respiratory Status: Room air    Exams:  Cog;  A & O x person and place and situation - not date;  decreased safety awareness currently  ROM:  BLE  WFL      MMT:  BLE  pain with activity and resistance -  R hip mm groups 4- and distal mm groups grossly 4-                     LLE hip mm groups 4- and distal mm groups grossly 4  Low mm endurance demo'd      Functional Mobility:  Bed mobility;  B direction log rolling with max assist; sit EOB scoot fwd initially max assist x 2 progressing during session to CGA  Txfs sup<>sit with max assist x 2  Txfs sit<>stand with max assist x 2 with stabilized RW and bed slightly elevated to accomplish with unsuccessful first attempts and then successfully performed twice with mod assist x 2  Pre gait and gait:  amb ~2 side steps to Right at bedside with RW with min/mod assist x 2 -- Verbal cues throughout stance time for upright trunk and eyes to horizon and chest up as able;  LE shaky and demo'd buckling once during second stance trial  Static seated balance:  max assist initially w posterior lean and then progressing to CGA   Poor+ to Good-  Dynamic seated balance:  Fair  Static stance balance:  Fair with RW             without RW Poor  Dynamic stance balance: Fair- with RW         without RW Poor/Zero        AM-PAC 6 CLICK MOBILITY  Total Score:10       Treatment & Education:  Ed provided for PT POC, use of LSO, BLT spinal precautions, and safety with all mobility and RW fit and use.  Initiated bed mobility training, txfs training, seated EOB endurance and balance work, static stance posture horace activity, pre gait and gait training.  Co eval with OT due to pt's potential complexity and to ensure pt's safety and maximize functional potential.      Patient left supine with partial R sidelying with all lines intact, call button in reach, bed alarm on, and nsg notified.    GOALS:   Multidisciplinary Problems       Physical Therapy Goals          Problem: Physical Therapy    Goal Priority Disciplines Outcome Interventions   Physical Therapy Goal     PT, PT/OT Progressing    Description: Goals to be met by: discharge date     Patient will increase functional independence with mobility by performin. Supine to sit with Modified [place]  2. Sit  to supine with Modified [place]  3. Rolling to Left and Right with Modified [place].  4. Sit to stand transfer with Modified [place]  5. Bed to chair transfer with Stand-by Assistance using Rolling Walker  6. Gait  x 150 feet with Stand-by Assistance using Rolling Walker.   7.  Able to don/doff LSO with mod independence                         DME Justifications:   Vick's mobility limitation cannot be sufficiently resolved by the use of a cane. His functional mobility deficit can be sufficiently resolved with the use of a Rolling Walker. Patient's mobility limitation significantly impairs their ability to participate in one of more activities of daily living.  The use of a RW will significantly improve the patient's ability to participate in MRADLS and the patient will use it on regular basis in the home.    History:     Past Medical History:   Diagnosis Date    Acute congestive heart failure 5/12/2023    Anemia     Anxiety     Cataract     Chronic pain syndrome 07/13/2011    CKD (chronic kidney disease), stage III     Coronary artery disease involving native coronary artery of native heart with angina pectoris 8/24/2023    Diabetes mellitus type II, uncontrolled     Discitis of lumbosacral region 01/16/2015    ED (erectile dysfunction)     Encounter for blood transfusion     Genital herpes     Gout, arthritis     History of alcohol abuse     History of hepatitis C, s/p successful Rx w/ SVR24 (cure) - 5/2018 08/23/2011    Completed 24wks Epclusa + RBV w/SVR12 - 2/2018  -     History of positive PPD, treatment status unknown     Pulmonary granulomas, negative sputum cultures for AFB and indeterminate quantferon test    History of substance abuse     Hypertension     Hypothyroidism     Liver replaced by transplant 08/23/2011    DATE: 12/16/2013  LIVER BIOPSY : REASON:  hep C staging  PATHOLOGY COMMITTEE NOTE/PLAN: :grade  1 / stage 1        Pancreatitis 2016    Peptic ulcer disease     Pulmonary  emphysema, unspecified emphysema type 5/26/2023       Past Surgical History:   Procedure Laterality Date    ANTERIOR CERVICAL DISCECTOMY W/ FUSION N/A 8/22/2022    Procedure: Procedure: ACDF 4-7 LOS: 4.0 Anesthesia: General Blood: Type & Screen Radiology: C-Arm Microscope: ------- SNS: EMG, SEP, MEP Brace: [place] Bed: Regular Bed, Shoulder Strap Headrest:------ Position: Supine Equipment: Depuy;  Surgeon: Titi Christian MD;  Location: [facility] OR;  Service: Neurosurgery;  Laterality: N/A;  Depuy  confirmed CW 8/19  Neuro monitoring confirmed CW 8/19    CARPAL TUNNEL RELEASE Left 10/29/2021    Procedure: RELEASE, CARPAL TUNNEL;  Surgeon: Jameson Alejo Jr., MD;  Location: [facility] OR;  Service: Orthopedics;  Laterality: Left;    CHOLECYSTECTOMY      CORONARY ANGIOGRAPHY N/A 8/2/2023    Procedure: ANGIOGRAM, CORONARY ARTERY;  Surgeon: Juan Vera MD;  Location: [facility] CATH LAB/EP;  Service: Cardiology;  Laterality: N/A;    DECOMPRESSION OF CERVICAL SPINE BY ANTERIOR APPROACH WITH FUSION  8/22/2022    Procedure: DECOMPRESSION AND FUSION, SPINE, CERVICAL, ANTERIOR APPROACH;  Surgeon: Titi Christian MD;  Location: [facility] OR;  Service: Neurosurgery;;    FACETECTOMY OF VERTEBRA N/A 5/15/2025    Procedure: FACETECTOMY;  Surgeon: Khadar Payne MD;  Location: [facility] OR;  Service: Neurosurgery;  Laterality: N/A;  Procedure:L4-5 laminectomy,medial facectomy  Length of procedure:1.5 hours  LOS: 3 nights  Anesthesia;General  Blood:Type and screen  Radiology:C-arm  Brace:LSO  Bed:[place] [id number] Poster  Position:Prone  Equipment:Depuy-Paco, Orthofix-Bone Stim    FUSION, SPINE, LUMBAR, OLIF, MINIMALLY INVASIVE N/A 5/15/2025    Procedure: ARTHRODESIS, SPINE, LUMBAR, OLIF, MINIMALLY INVASIVE;  Surgeon: Khadar Payne MD;  Location: [facility] OR;  Service: Neurosurgery;  Laterality: N/A;  Procedure:L4-5 OLiF conduit spacer, anterior intrusmentation switch plate  Length of procedure: 1.5 hours  Anesthesia:General  Blood:Type and  screen  Radiology:C-arm  Brace:LSO  Bed:Flat top  Position:Lateral right down  Equipment:Depuy-Step    INJECTION OF JOINT Right 12/2/2019    Procedure: INJECTION, JOINT SI;  Surgeon: Thu Penn MD;  Location: [facility] PAIN MGT;  Service: Pain Management;  Laterality: Right;  RT SI JNT INJ    INSERTION OF SPINAL NEUROSTIMULATOR N/A 9/3/2024    Procedure: INSERTION, NEUROSTIMULATOR, SPINAL;  Surgeon: Khadar Payne MD;  Location: [facility] OR;  Service: Neurosurgery;  Laterality: N/A;  Procedure:C1-2 generator placement  Length of procedure:1.5 hours  LOS: 0-1 nights  Anesthesia:General  Blood:Type and screen  Radiology:C-arm  Bed:Regular Bed  Headrest:[place]  Position:Prone  Equipment:Giraldo-Doc Juroseline    LAMINECTOMY, SPINE, FOR NEUROSTIMULATOR ELECTRODE INSERTION N/A 8/29/2024    Procedure: LAMINECTOMY, SPINE, FOR NEUROSTIMULATOR ELECTRODE INSERTION;  Surgeon: Khadar Payne MD;  Location: [facility] OR;  Service: Neurosurgery;  Laterality: N/A;  Procedure: C1-2 laminectomy for paddle lead placement for spinal cord stimulator trial, extension   Length of procedure: 2 hours  LOS: 0 nights  Anesthesia:General  Blood:Type and screen  Radiology:C-arm  SNS:EMG,SEP,MEP  Position:Pro    LEFT HEART CATHETERIZATION Left 8/2/2023    Procedure: Left heart cath;  Surgeon: Juan Vera MD;  Location: [facility] CATH LAB/EP;  Service: Cardiology;  Laterality: Left;    LIVER TRANSPLANT  06/2010    SPINE SURGERY      TRANSFORAMINAL EPIDURAL INJECTION OF STEROID Left 5/29/2023    Procedure: INJECTION, STEROID, EPIDURAL, TRANSFORAMINAL APPROACH,  LEFT C7-T1 DIRECT REF;  Surgeon: Thu Penn MD;  Location: [facility] PAIN MGT;  Service: Pain Management;  Laterality: Left;  4/3 MD ILL/PT WILL C/B       Time Tracking:     PT Received On: 05/16/25  PT Start Time: 0912     PT Stop Time: 0955  PT Total Time (min): 43 min     Billable Minutes: Evaluation 13, Gait Training 15, and Therapeutic Activity 15      05/16/2025

## 2025-05-16 NOTE — CONSULTS
[facility] Medicine  Consult Note    Patient Name: Vick Gonzalez  MRN: [id number]  Admission Date: 5/15/2025  Hospital Length of Stay: 0 days  Attending Physician: Khadar Payne MD   Primary Care Provider: Emanuel Lopez MD           Patient information was obtained from patient and ER records.     Consults  Subjective:     Principal Problem: Adjacent segment disease of lumbar spine with history of fusion procedure    Chief Complaint: No chief complaint on file.       HPI: Vick Gonzalez is a chronically ill 70-year-old male admitted to [facility] for adjacent segment disease of the lumbar spine.  The patient has a long complex history of chronic lower back pain and has developed neurogenic claudication.  He is followed in outpatient setting by Dr. Khadar Payne.  On today, the patient underwent Left L4-5 oblique interbody fusion, placement of interbody spacer, DePuy Conduit LLIF spacer filled with allograft BMP and DBM, L4-S1 posterior segmental instrumentation using DePuy Viper Prime system, and  L4-5 laminectomy, medial facetectomy, foraminotomy.  Due to the severity of the patient's underlying comorbidities, Hospital Medicine was consulted for medical management.    Past Medical History:   Diagnosis Date    Acute congestive heart failure 5/12/2023    Anemia     Anxiety     Cataract     Chronic pain syndrome 07/13/2011    CKD (chronic kidney disease), stage III     Coronary artery disease involving native coronary artery of native heart with angina pectoris 8/24/2023    Diabetes mellitus type II, uncontrolled     Discitis of lumbosacral region 01/16/2015    ED (erectile dysfunction)     Encounter for blood transfusion     Genital herpes     Gout, arthritis     History of alcohol abuse     History of hepatitis C, s/p successful Rx w/ SVR24 (cure) - 5/2018 08/23/2011    Completed 24wks Epclusa + RBV w/SVR12 - 2/2018  -     History of positive PPD, treatment status unknown      Pulmonary granulomas, negative sputum cultures for AFB and indeterminate quantferon test    History of substance abuse     Hypertension     Hypothyroidism     Liver replaced by transplant 08/23/2011    DATE: 12/16/2013  LIVER BIOPSY : REASON:  hep C staging  PATHOLOGY COMMITTEE NOTE/PLAN: :grade  1 / stage 1        Pancreatitis 2016    Peptic ulcer disease     Pulmonary emphysema, unspecified emphysema type 5/26/2023       Past Surgical History:   Procedure Laterality Date    ANTERIOR CERVICAL DISCECTOMY W/ FUSION N/A 8/22/2022    Procedure: Procedure: ACDF 4-7 LOS: 4.0 Anesthesia: General Blood: Type & Screen Radiology: C-Arm Microscope: ------- SNS: EMG, SEP, MEP Brace: [place] Bed: Regular Bed, Shoulder Strap Headrest:------ Position: Supine Equipment: Depuy;  Surgeon: Titi Christian MD;  Location: [facility] OR;  Service: Neurosurgery;  Laterality: N/A;  Depuy  confirmed CW 8/19  Neuro monitoring confirmed CW 8/19    CARPAL TUNNEL RELEASE Left 10/29/2021    Procedure: RELEASE, CARPAL TUNNEL;  Surgeon: Jameson Alejo Jr., MD;  Location: [facility] OR;  Service: Orthopedics;  Laterality: Left;    CHOLECYSTECTOMY      CORONARY ANGIOGRAPHY N/A 8/2/2023    Procedure: ANGIOGRAM, CORONARY ARTERY;  Surgeon: Juan Vera MD;  Location: [facility] CATH LAB/EP;  Service: Cardiology;  Laterality: N/A;    DECOMPRESSION OF CERVICAL SPINE BY ANTERIOR APPROACH WITH FUSION  8/22/2022    Procedure: DECOMPRESSION AND FUSION, SPINE, CERVICAL, ANTERIOR APPROACH;  Surgeon: Titi Christian MD;  Location: [facility] OR;  Service: Neurosurgery;;    INJECTION OF JOINT Right 12/2/2019    Procedure: INJECTION, JOINT SI;  Surgeon: Thu Penn MD;  Location: [facility] PAIN MGT;  Service: Pain Management;  Laterality: Right;  RT SI JNT INJ    INSERTION OF SPINAL NEUROSTIMULATOR N/A 9/3/2024    Procedure: INSERTION, NEUROSTIMULATOR, SPINAL;  Surgeon: Khadar Payne MD;  Location: [facility] OR;  Service: Neurosurgery;  Laterality: N/A;  Procedure:C1-2 generator  placement  Length of procedure:1.5 hours  LOS: 0-1 nights  Anesthesia:General  Blood:Type and screen  Radiology:C-arm  Bed:Regular Bed  Headrest:Denny  Position:Prone  Equipment:Giraldo-Doc Bob    LAMINECTOMY, SPINE, FOR NEUROSTIMULATOR ELECTRODE INSERTION N/A 8/29/2024    Procedure: LAMINECTOMY, SPINE, FOR NEUROSTIMULATOR ELECTRODE INSERTION;  Surgeon: Khadar Payne MD;  Location: [facility] OR;  Service: Neurosurgery;  Laterality: N/A;  Procedure: C1-2 laminectomy for paddle lead placement for spinal cord stimulator trial, extension   Length of procedure: 2 hours  LOS: 0 nights  Anesthesia:General  Blood:Type and screen  Radiology:C-arm  SNS:EMG,SEP,MEP  Position:Pro    LEFT HEART CATHETERIZATION Left 8/2/2023    Procedure: Left heart cath;  Surgeon: Juan Vera MD;  Location: [facility] CATH LAB/EP;  Service: Cardiology;  Laterality: Left;    LIVER TRANSPLANT  06/2010    SPINE SURGERY      TRANSFORAMINAL EPIDURAL INJECTION OF STEROID Left 5/29/2023    Procedure: INJECTION, STEROID, EPIDURAL, TRANSFORAMINAL APPROACH,  LEFT C7-T1 DIRECT REF;  Surgeon: Thu Penn MD;  Location: [facility] [place] PAIN MGT;  Service: Pain Management;  Laterality: Left;  4/3 MD ILL/PT WILL C/B       Review of patient's allergies indicates:  No Known Allergies    No current facility-administered medications on file prior to encounter.     Current Outpatient Medications on File Prior to Encounter   Medication Sig    allopurinoL (ZYLOPRIM) 100 MG tablet TAKE 1 TABLET BY MOUTH TWICE A DAY    aluminum-magnesium hydroxide-simethicone (MAALOX) 200-200-20 mg/5 mL Susp Take 30 mLs by mouth 4 (four) times daily before meals and nightly.    aspirin (ECOTRIN) 81 MG EC tablet Take 1 tablet (81 mg total) by mouth once daily.    atorvastatin (LIPITOR) 40 MG tablet Take 1 tablet (40 mg total) by mouth once daily.    calcium carbonate-vitamin D3 (CALCIUM 600 WITH VITAMIN D3) 600 mg(1,500mg) -400 unit Chew Take 1 tablet by mouth once daily.     cyanocobalamin  "(VITAMIN B-12) 100 MCG tablet Take 100 mcg by mouth once daily.    diphenhydrAMINE (BENADRYL) 25 mg capsule Take 25 mg by mouth daily as needed for Allergies (Cold).    gabapentin (NEURONTIN) 800 MG tablet TAKE 1 TABLET BY MOUTH THREE TIMES A DAY    insulin glargine U-300 conc (TOUJEO MAX U-300 SOLOSTAR) 300 unit/mL (3 mL) insulin pen Inject 40 Units into the skin once daily.    levothyroxine (SYNTHROID) 88 MCG tablet TAKE 1 TABLET BY MOUTH EVERY DAY    lisinopriL (PRINIVIL,ZESTRIL) 20 MG tablet TAKE 1 TABLET BY MOUTH EVERY DAY    methocarbamoL (ROBAXIN) 750 MG Tab Take 1 tablet (750 mg total) by mouth 3 (three) times daily as needed (muscle spasms).    metoprolol succinate (TOPROL-XL) 200 MG 24 hr tablet Take 1 tablet (200 mg total) by mouth once daily.    multivit with min-folic acid (MEN'S MULTIVITAMIN GUMMIES) 200 mcg Chew Take 1 tablet by mouth once daily.    NOVOFINE PLUS 32 gauge x 1/6" Ndle     papaverine 30 mg/mL injection Tri-Mix - PGE (alprostadil) 10mcg, papavarine 30mg, phentolamine 1mg; dispense 5mL vial, typical starting is 0.2 mL which is equal to 20 units (Patient taking differently: as needed. Tri-Mix - PGE (alprostadil) 10mcg, papavarine 30mg, phentolamine 1mg; dispense 5mL vial, typical starting is 0.2 mL which is equal to 20 units)    polyethylene glycol (MIRALAX) 17 gram PwPk Take 17 g by mouth once daily.    sildenafiL (VIAGRA) 100 MG tablet Take 1 tablet (100 mg total) by mouth daily as needed for Erectile Dysfunction.    tacrolimus (PROGRAF) 1 MG Cap Take 2 capsules (2 mg total) by mouth every morning AND 1 capsule (1 mg total) every evening.    traZODone (DESYREL) 50 MG tablet TAKE 1 TABLET BY MOUTH EVERY DAY IN THE EVENING    TRUE METRIX GLUCOSE TEST STRIP Strp USE 3 TIMES DAILY TO TEST BLOOD GLUCOSE LEVEL    blood-glucose meter Misc 1 Device by Misc.(Non-Drug; Combo Route) route 3 (three) times daily.    blood-glucose meter,continuous (DEXCOM G7 ) Misc Use as directed.    " blood-glucose sensor (DEXCOM G7 SENSOR) Viviane Use as directed.    blood-glucose transmitter (DEXCOM G6 TRANSMITTER) Viviane Use as directed    furosemide (LASIX) 20 MG tablet Take 2 tablets (40 mg total) by mouth daily as needed (For Weight Gain > 2-3 lbs in 1 day or 4-6 lbs over 1 week notify PCP and take 40 mg daily for three days).    insulin lispro (HUMALOG KWIKPEN INSULIN) 100 unit/mL pen Inject 20 Units into the skin 3 (three) times daily with meals.    lancets 30 gauge Misc 1 lancet by Misc.(Non-Drug; Combo Route) route 4 (four) times daily before meals and nightly.    [] oxyCODONE-acetaminophen (PERCOCET)  mg per tablet Take 1 tablet by mouth every 8 (eight) hours as needed for Pain. Quantity greater than 7 day supply medically necessary    tirzepatide (MOUNJARO) 2.5 mg/0.5 mL PnIj Inject 2.5 mg into the skin every 7 days.    [DISCONTINUED] insulin aspart U-100 (NOVOLOG FLEXPEN U-100 INSULIN) 100 unit/mL (3 mL) InPn pen Inject 20 Units into the skin 3 (three) times daily with meals.     Family History       Problem Relation (Age of Onset)    Cancer Mother, Maternal Uncle (82)    Diabetes Mother, Sister    Drug abuse Daughter    Heart disease Mother          Tobacco Use    Smoking status: Former     Passive exposure: Never    Smokeless tobacco: Never   Substance and Sexual Activity    Alcohol use: No     Comment: over 5 years ago, none currently    Drug use: Not Currently     Comment: Former cocaine use    Sexual activity: Yes     Review of Systems   Constitutional: Negative.    HENT: Negative.     Eyes: Negative.    Respiratory: Negative.     Cardiovascular: Negative.    Gastrointestinal: Negative.    Endocrine: Negative.    Genitourinary: Negative.    Musculoskeletal:  Positive for back pain.   Skin: Negative.    Allergic/Immunologic: Negative.    Neurological: Negative.    Hematological: Negative.    Psychiatric/Behavioral: Negative.       Objective:     Vital Signs (Most Recent):  Temp: 97.5 °F  (36.4 °C) (05/15/25 1559)  Pulse: 100 (05/15/25 1747)  Resp: 18 (05/15/25 1745)  BP: (!) 168/83 (05/15/25 1747)  SpO2: 98 % (05/15/25 1559) Vital Signs (24h Range):  Temp:  [97.5 °F (36.4 °C)-98.8 °F (37.1 °C)] 97.5 °F (36.4 °C)  Pulse:  [] 100  Resp:  [12-18] 18  SpO2:  [98 %-99 %] 98 %  BP: (156-184)/(74-91) 168/83     Weight: 116.1 kg (255 lb 15.3 oz)  Body mass index is 32.86 kg/m².     Physical Exam  HENT:      Head: Normocephalic.      Nose: Nose normal.      Mouth/Throat:      Mouth: Mucous membranes are moist.   Eyes:      Extraocular Movements: Extraocular movements intact.      Conjunctiva/sclera: Conjunctivae normal.      Pupils: Pupils are equal, round, and reactive to light.   Cardiovascular:      Rate and Rhythm: Normal rate.   Pulmonary:      Effort: Pulmonary effort is normal.      Breath sounds: Normal breath sounds.   Abdominal:      General: Abdomen is flat.   Musculoskeletal:         General: Normal range of motion.      Cervical back: Normal range of motion and neck supple.   Skin:     General: Skin is warm.      Capillary Refill: Capillary refill takes less than 2 seconds.      Comments: Surgical incision to mid-back and left flank; BILL drain appreciated.   Neurological:      General: No focal deficit present.      Mental Status: He is alert.   Psychiatric:         Mood and Affect: Mood normal.         Behavior: Behavior normal.         Thought Content: Thought content normal.         Judgment: Judgment normal.          Significant Labs: All pertinent labs within the past 24 hours have been reviewed.  Recent Lab Results         05/15/25  1649   05/15/25  1438   05/15/25  0711   05/15/25  0702        Group & Rh       B POS       INDIRECT WENDY       NEG       POCT Glucose 228   216   122         Specimen Outdate       05/18/2025 23:59               Significant Imaging: I have reviewed all pertinent imaging results/findings within the past 24 hours.  I have reviewed and interpreted all  pertinent imaging results/findings within the past 24 hours.  Assessment/Plan:     * Adjacent segment disease of lumbar spine with history of fusion procedure  -Defer to neurosurgery to manage      CKD (chronic kidney disease), stage III  Creatine stable for now. BMP reviewed- noted CrCl cannot be calculated (Patient's most recent lab result is older than the maximum 7 days allowed.). according to latest data. Based on current GFR, CKD stage is stage 3 - GFR 30-59.  Monitor UOP and serial BMP and adjust therapy as needed. Renally dose meds. Avoid nephrotoxic medications and procedures.    Hypertriglyceridemia  -Resume home medications      S/P liver transplant  -Resume Prograf      Type 2 diabetes mellitus with diabetic polyneuropathy, with long-term current use of insulin  -Resume home insulin  -Accu-Cheks AC and HS      Essential hypertension  Patient's blood pressure range in the last 24 hours was: BP  Min: 156/74  Max: 184/91.The patient's inpatient anti-hypertensive regimen is listed below:  Current Antihypertensives  metoprolol succinate (TOPROL-XL) 24 hr tablet 200 mg, Daily, Oral    Plan  - BP is controlled, no changes needed to their regimen    Gout, unspecified  -Resume allopurinol      Acquired hypothyroidism        Chronic pain syndrome  -Analgesic therapy as needed        VTE Risk Mitigation (From admission, onward)           Ordered     heparin (porcine) injection 5,000 Units  Every 12 hours         05/15/25 1625     IP VTE HIGH RISK PATIENT  Once         05/15/25 1625     Place sequential compression device  Until discontinued         05/15/25 1625                        Thank you for your consult. I will follow-up with patient. Please contact us if you have any additional questions.    Niles Shell NP  Department of Hospital Medicine   [facility] Surg

## 2025-05-16 NOTE — PLAN OF CARE
Problem: Physical Therapy  Goal: Physical Therapy Goal  Description: Goals to be met by: discharge date     Patient will increase functional independence with mobility by performin. Supine to sit with Modified [place]  2. Sit to supine with Modified [place]  3. Rolling to Left and Right with Modified [place].  4. Sit to stand transfer with Modified [place]  5. Bed to chair transfer with Stand-by Assistance using Rolling Walker  6. Gait  x 150 feet with Stand-by Assistance using Rolling Walker.   7.  Able to don/doff LSO with mod independence    Outcome: Progressing     Pt cooperative throughout PT/OT co-evaluation despite expressing significant pain in cervical region, L UE, and back incision areas.  Pt req'd max assist x 2 for bed mobility and txfs at this time.  Amb ~2 side steps w RW at bedside with min/mod assist x 2 for safety w LSO intact for activity.  Rec continued participation with acute PT/OT services and rec High Intensity Therapy upon discharge to next level of care to reach maximum level of potential recovery to independence again as well as be able to navigate the flight of stairs necessary to enter his apartment.  Despite patient's co-morbidities, there is an expectation of returning to prior level of function to maintain independence thus avoiding readmission.  Patient's clinical condition meets full high intensity therapy criteria; including the ability to actively participate in 3 hours of therapy.  A lower level of care cannot provide the interdisciplinary treatment approach needed.

## 2025-05-16 NOTE — NURSING
Pt. Has scheduled short acting insulin. Bedside nurse verified with KUNAL Rogers if Pt. To receive scheduled insulin with a sugar of 181. NP stated to hold scheduled insulin. Plan of care continued.

## 2025-05-16 NOTE — PROGRESS NOTES
[facility]  Neurosurgery  Progress Note    Subjective:     Interval History: Complains of back, neck and left hand pain.  No leg pain.  Hasn't gotten up yet.      History of Present Illness:     He is complaining of worsening low back pain, radiating down the bilateral buttock and posterolateral thigh.  He has difficulty standing upright and has to lean forward when he walks.  He is walking using a cane.  Pain is affecting his ability to stand, walk.  He is unable to do much physical activity at this time due to the severe pain.  He takes gabapentin 800 mg 3 times daily.  He takes Percocet 10 mg 3 times daily.  He is a nonsmoker.  He is not drinking at this time.     He is using his cervical spinal cord stimulator.  His neck pain has significantly improved following the spinal cord stimulator placement.  He does not complain of severe left arm pain anymore.  He has gait imbalance that has remained stable.    Post-Op Info:  Procedure(s) (LRB):  ARTHRODESIS, SPINE, LUMBAR, OLIF, MINIMALLY INVASIVE (N/A)  FACETECTOMY (N/A)   1 Day Post-Op      Medications:  Continuous Infusions:   lactated ringers   Intravenous Continuous 75 mL/hr at 05/15/25 1800 Rate Verify at 05/15/25 1800     Scheduled Meds:   acetaminophen  650 mg Oral Q6H    allopurinoL  100 mg Oral BID    aluminum-magnesium hydroxide-simethicone  30 mL Oral QID (AC & HS)    bisacodyL  10 mg Rectal Daily    gabapentin  800 mg Oral TID    heparin (porcine)  5,000 Units Subcutaneous Q12H    insulin aspart U-100  20 Units Subcutaneous TIDWM    insulin glargine U-100 (Lantus)  20 Units Subcutaneous Daily    levothyroxine  88 mcg Oral Daily    methocarbamoL  750 mg Oral TID    metoprolol succinate  200 mg Oral Daily    mupirocin   Nasal BID    polyethylene glycol  17 g Oral Daily    tacrolimus  2 mg Oral Daily AM    And    tacrolimus  1 mg Oral Daily PM    traZODone  50 mg Oral QHS     PRN Meds:  Current Facility-Administered Medications:     aluminum-magnesium  "hydroxide-simethicone, 30 mL, Oral, Q4H PRN    HYDROmorphone, 2 mg, Intravenous, Q3H PRN    methocarbamoL, 750 mg, Oral, TID PRN    ondansetron, 8 mg, Oral, Q6H PRN    oxyCODONE, 15 mg, Oral, Q4H PRN    prochlorperazine, 5 mg, Intravenous, Q6H PRN    senna-docusate, 2 tablet, Oral, Nightly PRN     Review of Systems  Objective:     Weight: 116.1 kg (255 lb 15.3 oz)  Body mass index is 32.86 kg/m².  Vital Signs (Most Recent):  Temp: 100 °F (37.8 °C) (05/16/25 0725)  Pulse: 110 (05/16/25 0816)  Resp: 20 (05/16/25 0824)  BP: (!) 153/73 (05/16/25 0725)  SpO2: 99 % (05/16/25 0816) Vital Signs (24h Range):  Temp:  [97.5 °F (36.4 °C)-100 °F (37.8 °C)] 100 °F (37.8 °C)  Pulse:  [] 110  Resp:  [12-20] 20  SpO2:  [91 %-99 %] 99 %  BP: (125-181)/(65-90) 153/73     Date 05/16/25 0700 - 05/17/25 0659   Shift 2466-1519 9826-9881 7310-6155 24 Hour Total   INTAKE   Shift Total(mL/kg)       OUTPUT   Drains 40   40   Shift Total(mL/kg) 40(0.3)   40(0.3)   Weight (kg) 116.1 116.1 116.1 116.1                            Closed/Suction Drain 05/15/25 1420 Tube - 1 Left Back Bulb 10 Fr. (Active)   Site Description Bleeding 05/15/25 1625   Dressing Type Other (Comment) 05/15/25 1625   Dressing Status New drainage 05/15/25 1625   Dressing Intervention First dressing 05/15/25 1625   Drainage Serosanguineous 05/15/25 1625   Status To bulb suction 05/15/25 1625   Output (mL) 40 mL 05/16/25 0710            Urethral Catheter 05/15/25 0945 Non-latex;Straight-tip 16 Fr. (Active)   Site Assessment Intact;Clean;Dry 05/15/25 2000   Collection Container Urimeter 05/15/25 2000   Securement Method secured to top of thigh w/ adhesive device 05/15/25 1625   Reason for Continuing Urinary Catheterization Post operative 05/15/25 1625   CAUTI Prevention Bundle Securement Device in place with 1" slack;Intact seal between catheter & drainage tubing;Drainage bag/urimeter off the floor;Sheeting clip in use;No dependent loops or kinks;Drainage bag/urimeter " "below bladder;Drainage bag/urimeter not overfilled (<2/3 full) 05/15/25 1625   Output (mL) 450 mL 05/16/25 0424       Neurosurgery Physical Exam    General: well developed, well nourished, no distress  Mental Status: Awake, Alert, Oriented X3.Thought content appropriate  GCS: Motor: 6/Verbal: 5/Eyes: 4 GCS Total: 15    Motor Strength:  Strength                                HF KF KE DF PF EHL   Lower: R 5/5 5/5 5/5 5/5 5/5 5/5    L 5/5 5/5 5/5 5/5 5/5 5/5       Incision- CDI  Drain- 100      Significant Labs:  Recent Labs   Lab 05/16/25  0242   *   *   K 4.2      CO2 16*   BUN 10   CREATININE 0.7   CALCIUM 7.4*     Recent Labs   Lab 05/16/25  0242   WBC 8.50   HGB 8.6*   HCT 27.4*   *     No results for input(s): "LABPT", "INR", "APTT" in the last 48 hours.  Microbiology Results (last 7 days)       ** No results found for the last 168 hours. **              Assessment/Plan:     Active Diagnoses:    Diagnosis Date Noted POA    PRINCIPAL PROBLEM:  Adjacent segment disease of lumbar spine with history of fusion procedure [M51.369, Z98.1] 04/08/2015 Not Applicable    Spinal stenosis of lumbar region with neurogenic claudication [M48.062] 05/15/2025 Yes    Type 2 diabetes mellitus, with long-term current use of insulin [E11.9, Z79.4] 05/15/2025 Not Applicable    CKD (chronic kidney disease), stage III [N18.30]  Yes     Chronic    Hypertriglyceridemia [E78.1] 10/05/2016 Yes    Type 2 diabetes mellitus with diabetic polyneuropathy, with long-term current use of insulin [E11.42, Z79.4] 10/04/2016 Not Applicable     Chronic    S/P liver transplant [Z94.4] 10/04/2016 Not Applicable     Chronic    Essential hypertension [I10] 06/19/2015 Yes    Acquired hypothyroidism [E03.9]  Yes     Chronic    Chronic pain syndrome [G89.4] 07/13/2011 Yes    Gout, unspecified [M10.9] 03/14/2011 Yes      Problems Resolved During this Admission:       A/P:  POD #1 left L4-5 OLIF and posterior " instrumentation     --Neurologically stable          -q4h neuro checks  --Imaging: Post op xrays pending  --Drains: Continue  --Pain control  --DVT ppx: TEDs/SCDs/SQH  --Activity: PT/OT, OOB. LSO brace  --Diet: Diabetic, Saline Lock IV  --Bowel regimen: PRN suppository, senna PRN  --Urinary: DC tierney  --Atelectasis ppx: Encourage IS hourly  --Appreciate  recs  --AM labs for 2 days  --Will see if he has remote for cervical SCS     Dispo: Pending PT/OT recs, imaging, drain    All of the above discussed and reviewed with Dr. Payne.      GHISLAINE JoC  Neurosurgery  [place] [facility]

## 2025-05-16 NOTE — ED TRIAGE NOTES
Vick Gonzalez, a 67 y.o. male presents to the ED w/ complaint of nosebleed/headache and bilateral ear pain    Triage note:  Chief Complaint   Patient presents with    Epistaxis     States nosebleed this am. States also happened Friday. No bleeding at triage. Also complaining of headache/ear ache     Review of patient's allergies indicates:  No Known Allergies  Past Medical History:   Diagnosis Date    Acute congestive heart failure 5/12/2023    Anemia     Anxiety     Cataract     Chronic pain syndrome 07/13/2011    CKD (chronic kidney disease), stage III     Coronary artery disease involving native coronary artery of native heart with angina pectoris 8/24/2023    Diabetes mellitus type II, uncontrolled     Discitis of lumbosacral region 01/16/2015    ED (erectile dysfunction)     Encounter for blood transfusion     Genital herpes     Gout, arthritis     History of alcohol abuse     History of hepatitis C, s/p successful Rx w/ SVR24 (cure) - 5/2018 08/23/2011    Completed 24wks Epclusa + RBV w/SVR12 - 2/2018  -     History of positive PPD, treatment status unknown     Pulmonary granulomas, negative sputum cultures for AFB and indeterminate quantferon test    History of substance abuse     Hypertension     Hypothyroidism     Liver replaced by transplant 08/23/2011    DATE: 12/16/2013  LIVER BIOPSY : REASON:  hep C staging  PATHOLOGY COMMITTEE NOTE/PLAN: :grade  1 / stage 1        Pancreatitis 2016    Peptic ulcer disease     Pulmonary emphysema, unspecified emphysema type 5/26/2023      LOC: The patient is awake, alert, aware of environment with an appropriate affect. Oriented x4, speaking appropriately  APPEARANCE: Pt resting comfortably, in no acute distress, pt is clean and well groomed, clothing properly fastened  SKIN:The skin is warm and dry, color consistent with ethnicity, patient has normal skin turgor and moist mucus membranes, no bruising noted   RESPIRATORY:Airway is open and patent, respirations are  spontaneous, patient has a normal effort and rate, no accessory muscle use noted.  CARDIAC: Normal rate and rhythm, no peripheral edema noted,  ABDOMEN: Soft, non tender, non distended obese  NEUROLOGIC: PERRLA, facial expression is symmetrical, patient moving all extremities spontaneously, normal sensation in all extremities when touched with a finger.  Follows all commands appropriately. Complaining of generalized headache  MUSCULOSKELETAL: Patient moving all extremities spontaneously, no obvious swelling or deformities noted. Pt does use a cane to ambulate daily.   EENT: bilateral ear pain. Decreased hearing. Stated nosebleed       Unable to meet estimated needs on current diet order

## 2025-05-16 NOTE — PROGRESS NOTES
[facility] Medicine  Progress Note    Patient Name: Vick Gonzalez  MRN: [id number]  Patient Class: IP- Inpatient   Admission Date: 5/15/2025  Length of Stay: 1 days  Attending Physician: Khadar Payne MD  Primary Care Provider: Emanuel Lopez MD        Subjective     Principal Problem:Adjacent segment disease of lumbar spine with history of fusion procedure        HPI:  Vick Gonzalez is a chronically ill 70-year-old male admitted to [facility] today for adjacent segment disease of the lumbar spine.  The patient has a long complex history of chronic lower back pain and has developed neurogenic claudication.  He is followed in outpatient setting by Dr. Khadar Payne.  On today, the patient underwent Left L4-5 oblique interbody fusion, placement of interbody spacer, DePuy Conduit LLIF spacer filled with allograft BMP and DBM, L4-S1 posterior segmental instrumentation using DePuy Viper Prime system, and  L4-5 laminectomy, medial facetectomy, foraminotomy.  Due to the severity of the patient's underlying comorbidities, Hospital Medicine was consulted for medical management.    Overview/Hospital Course:  No notes on file    Interval History:  Seen at the bedside.  No distress noted.  Patient is reporting an attempt pain to his back as well as numbness to his left upper extremity which he states has been there since before his surgery.  We will continue to follow along with the primary team to monitor medical management.  Pain regimen on board.    Review of Systems   Constitutional:  Positive for activity change and fever.   HENT:  Negative for trouble swallowing.    Musculoskeletal:  Positive for arthralgias, back pain, gait problem, myalgias and neck pain.   Neurological:  Positive for numbness (Chronic).   Psychiatric/Behavioral:  Negative for confusion.      Objective:     Vital Signs (Most Recent):  Temp: (!) 100.6 °F (38.1 °C) (05/16/25 1115)  Pulse: 98 (05/16/25 1144)  Resp: 18 (05/16/25  1144)  BP: (!) 155/87 (05/16/25 1115)  SpO2: 98 % (05/16/25 1144) Vital Signs (24h Range):  Temp:  [97.5 °F (36.4 °C)-100.6 °F (38.1 °C)] 100.6 °F (38.1 °C)  Pulse:  [] 98  Resp:  [12-20] 18  SpO2:  [91 %-99 %] 98 %  BP: (125-181)/(65-90) 155/87     Weight: 116.1 kg (255 lb 15.3 oz)  Body mass index is 32.86 kg/m².    Intake/Output Summary (Last 24 hours) at 5/16/2025 1325  Last data filed at 5/16/2025 0710  Gross per 24 hour   Intake 1890.37 ml   Output 830 ml   Net 1060.37 ml         Physical Exam  Vitals and nursing note reviewed.   Constitutional:       Appearance: He is ill-appearing.   HENT:      Mouth/Throat:      Mouth: Mucous membranes are moist.   Eyes:      Extraocular Movements: Extraocular movements intact.   Cardiovascular:      Rate and Rhythm: Normal rate and regular rhythm.   Pulmonary:      Effort: Pulmonary effort is normal.   Neurological:      Mental Status: He is alert.      Motor: Weakness present.               Significant Labs: All pertinent labs within the past 24 hours have been reviewed.    Significant Imaging: I have reviewed all pertinent imaging results/findings within the past 24 hours.      Assessment & Plan  Adjacent segment disease of lumbar spine with history of fusion procedure  -Defer to neurosurgery to manage    Chronic pain syndrome  -Analgesic therapy as needed    Acquired hypothyroidism  -resume levothyroxine 88 mcg daily    Gout, unspecified  -Resume allopurinol    Essential hypertension  Patient's blood pressure range in the last 24 hours was: BP  Min: 125/69  Max: 181/90.The patient's inpatient anti-hypertensive regimen is listed below:  Current Antihypertensives  metoprolol succinate (TOPROL-XL) 24 hr tablet 200 mg, Daily, Oral  lisinopriL tablet 20 mg, Daily, Oral    Plan  - BP is controlled, no changes needed to their regimen  Type 2 diabetes mellitus with diabetic polyneuropathy, with long-term current use of insulin  -Resume home insulin  -Ángelu-Vasquez MUÑOZ and  HS    S/P liver transplant  -Resume Prograf    Hypertriglyceridemia  -Resume home medications    CKD (chronic kidney disease), stage III  Creatine stable for now. BMP reviewed- noted Estimated Creatinine Clearance: 138.8 mL/min (based on SCr of 0.7 mg/dL). according to latest data. Based on current GFR, CKD stage is stage 3 - GFR 30-59.  Monitor UOP and serial BMP and adjust therapy as needed. Renally dose meds. Avoid nephrotoxic medications and procedures.  Spinal stenosis of lumbar region with neurogenic claudication  Per primary team    Type 2 diabetes mellitus, with long-term current use of insulin  Holding home meds  We will order insulin siding scale here in the hospital      VTE Risk Mitigation (From admission, onward)           Ordered     heparin (porcine) injection 5,000 Units  Every 12 hours         05/15/25 1625     IP VTE HIGH RISK PATIENT  Once         05/15/25 1625     Place sequential compression device  Until discontinued         05/15/25 1625                    Discharge Planning   JOJO: 5/20/2025     Code Status: Full Code   Medical Readiness for Discharge Date:   Discharge Plan A: Home, Home with family (Outpt therapy)                Please place Justification for DME        Sue Suarez NP  Department of Hospital Medicine   [facility]

## 2025-05-16 NOTE — NURSING
Patient refused to remove his catheter due to his rationale that his hand can't hold his private part to urinate. MD Payne was notified via secure chat with his refusal. No new order.

## 2025-05-16 NOTE — ANESTHESIA POSTPROCEDURE EVALUATION
Anesthesia Post Evaluation    Patient: Vick Gonzalez    Procedure(s) Performed: Procedure(s) (LRB):  ARTHRODESIS, SPINE, LUMBAR, OLIF, MINIMALLY INVASIVE (N/A)  FACETECTOMY (N/A)    Final Anesthesia Type: general      Patient location during evaluation: PACU  Patient participation: Yes- Able to Participate  Level of consciousness: awake and alert, oriented and awake  Post-procedure vital signs: reviewed and stable  Pain management: adequate  Airway patency: patent  LAWRENCE mitigation strategies: Multimodal analgesia  PONV status at discharge: No PONV  Anesthetic complications: no      Cardiovascular status: blood pressure returned to baseline and hemodynamically stable  Respiratory status: unassisted and spontaneous ventilation  Hydration status: euvolemic  Follow-up not needed.              Vitals Value Taken Time   /65 05/16/25 04:24   Temp 37.6 °C (99.6 °F) 05/16/25 04:45   Pulse 113 05/16/25 04:47   Resp 18 05/16/25 04:47   SpO2 94 % 05/16/25 04:47         Event Time   Out of Recovery 15:52:00         Pain/Reinier Score: Pain Rating Prior to Med Admin: 8 (5/16/2025  6:08 AM)  Pain Rating Post Med Admin: 10 (5/15/2025 11:35 PM)  Reinier Score: 9 (5/15/2025 10:10 PM)

## 2025-05-16 NOTE — PLAN OF CARE
Problem: Adult Inpatient Plan of Care  Goal: Plan of Care Review  Outcome: Ongoing     Problem: Adult Inpatient Plan of Care  Goal: Optimal Comfort and Wellbeing  Outcome: Ongoing     Problem: Wound  Goal: Optimal Coping  Outcome: Ongoing     Problem: Spinal Surgery  Goal: Optimal Pain Control and Function  Outcome: Ongoing     Problem: Spinal Surgery  Goal: Effective Urinary Elimination  Outcome: Ongoing     Nightly and prn medications administered as ordered.uncontrolled pain despite the medications that was given. BILL drain was monitored and emptied. Giordano catheter remain patent and will be discontinued eligio am. On going monitoring.

## 2025-05-17 LAB
ABSOLUTE EOSINOPHIL (OHS): 0.22 K/UL
ABSOLUTE MONOCYTE (OHS): 1.15 K/UL (ref 0.3–1)
ABSOLUTE NEUTROPHIL COUNT (OHS): 9.94 K/UL (ref 1.8–7.7)
ANION GAP (OHS): 8 MMOL/L (ref 8–16)
BASOPHILS # BLD AUTO: 0.04 K/UL
BASOPHILS NFR BLD AUTO: 0.3 %
BUN SERPL-MCNC: 16 MG/DL (ref 8–23)
CALCIUM SERPL-MCNC: 9 MG/DL (ref 8.7–10.5)
CHLORIDE SERPL-SCNC: 103 MMOL/L (ref 95–110)
CO2 SERPL-SCNC: 26 MMOL/L (ref 23–29)
CREAT SERPL-MCNC: 1.1 MG/DL (ref 0.5–1.4)
ERYTHROCYTE [DISTWIDTH] IN BLOOD BY AUTOMATED COUNT: 14 % (ref 11.5–14.5)
GFR SERPLBLD CREATININE-BSD FMLA CKD-EPI: >60 ML/MIN/1.73/M2
GLUCOSE SERPL-MCNC: 149 MG/DL (ref 70–110)
HCT VFR BLD AUTO: 29.7 % (ref 40–54)
HGB BLD-MCNC: 9.7 GM/DL (ref 14–18)
IMM GRANULOCYTES # BLD AUTO: 0.09 K/UL (ref 0–0.04)
IMM GRANULOCYTES NFR BLD AUTO: 0.7 % (ref 0–0.5)
LACTATE SERPL-SCNC: 1.4 MMOL/L (ref 0.5–2.2)
LACTATE SERPL-SCNC: 2.1 MMOL/L (ref 0.5–2.2)
LYMPHOCYTES # BLD AUTO: 1.79 K/UL (ref 1–4.8)
MCH RBC QN AUTO: 29.1 PG (ref 27–31)
MCHC RBC AUTO-ENTMCNC: 32.7 G/DL (ref 32–36)
MCV RBC AUTO: 89 FL (ref 82–98)
NUCLEATED RBC (/100WBC) (OHS): 0 /100 WBC
PLATELET # BLD AUTO: 134 K/UL (ref 150–450)
PMV BLD AUTO: 11.7 FL (ref 9.2–12.9)
POCT GLUCOSE: 118 MG/DL (ref 70–110)
POCT GLUCOSE: 159 MG/DL (ref 70–110)
POCT GLUCOSE: 169 MG/DL (ref 70–110)
POCT GLUCOSE: 175 MG/DL (ref 70–110)
POTASSIUM SERPL-SCNC: 4.3 MMOL/L (ref 3.5–5.1)
RBC # BLD AUTO: 3.33 M/UL (ref 4.6–6.2)
RELATIVE EOSINOPHIL (OHS): 1.7 %
RELATIVE LYMPHOCYTE (OHS): 13.5 % (ref 18–48)
RELATIVE MONOCYTE (OHS): 8.7 % (ref 4–15)
RELATIVE NEUTROPHIL (OHS): 75.1 % (ref 38–73)
SODIUM SERPL-SCNC: 137 MMOL/L (ref 136–145)
TACROLIMUS BLD-MCNC: 3.1 NG/ML (ref 5–15)
WBC # BLD AUTO: 13.23 K/UL (ref 3.9–12.7)

## 2025-05-17 PROCEDURE — 25000003 PHARM REV CODE 250: Performed by: NURSE PRACTITIONER

## 2025-05-17 PROCEDURE — 25000003 PHARM REV CODE 250: Performed by: NEUROLOGICAL SURGERY

## 2025-05-17 PROCEDURE — 94799 UNLISTED PULMONARY SVC/PX: CPT

## 2025-05-17 PROCEDURE — 63600175 PHARM REV CODE 636 W HCPCS: Performed by: NEUROLOGICAL SURGERY

## 2025-05-17 PROCEDURE — 85025 COMPLETE CBC W/AUTO DIFF WBC: CPT | Performed by: PHYSICIAN ASSISTANT

## 2025-05-17 PROCEDURE — 94761 N-INVAS EAR/PLS OXIMETRY MLT: CPT

## 2025-05-17 PROCEDURE — 97530 THERAPEUTIC ACTIVITIES: CPT

## 2025-05-17 PROCEDURE — 36415 COLL VENOUS BLD VENIPUNCTURE: CPT | Performed by: INTERNAL MEDICINE

## 2025-05-17 PROCEDURE — 80048 BASIC METABOLIC PNL TOTAL CA: CPT | Performed by: PHYSICIAN ASSISTANT

## 2025-05-17 PROCEDURE — 83605 ASSAY OF LACTIC ACID: CPT | Performed by: INTERNAL MEDICINE

## 2025-05-17 PROCEDURE — 11000001 HC ACUTE MED/SURG PRIVATE ROOM

## 2025-05-17 PROCEDURE — 80197 ASSAY OF TACROLIMUS: CPT | Performed by: NURSE PRACTITIONER

## 2025-05-17 PROCEDURE — 97530 THERAPEUTIC ACTIVITIES: CPT | Mod: CQ

## 2025-05-17 PROCEDURE — 51798 US URINE CAPACITY MEASURE: CPT

## 2025-05-17 PROCEDURE — 97112 NEUROMUSCULAR REEDUCATION: CPT | Mod: CQ

## 2025-05-17 PROCEDURE — 99900035 HC TECH TIME PER 15 MIN (STAT)

## 2025-05-17 RX ADMIN — HYDROMORPHONE HYDROCHLORIDE 2 MG: 2 INJECTION INTRAMUSCULAR; INTRAVENOUS; SUBCUTANEOUS at 02:05

## 2025-05-17 RX ADMIN — ALLOPURINOL 100 MG: 100 TABLET ORAL at 08:05

## 2025-05-17 RX ADMIN — ACETAMINOPHEN 650 MG: 325 TABLET ORAL at 05:05

## 2025-05-17 RX ADMIN — ACETAMINOPHEN 650 MG: 325 TABLET ORAL at 11:05

## 2025-05-17 RX ADMIN — ACETAMINOPHEN 650 MG: 325 TABLET ORAL at 06:05

## 2025-05-17 RX ADMIN — METHOCARBAMOL 750 MG: 750 TABLET ORAL at 02:05

## 2025-05-17 RX ADMIN — GABAPENTIN 900 MG: 300 CAPSULE ORAL at 08:05

## 2025-05-17 RX ADMIN — INSULIN GLARGINE 20 UNITS: 100 INJECTION, SOLUTION SUBCUTANEOUS at 08:05

## 2025-05-17 RX ADMIN — OXYCODONE 15 MG: 5 TABLET ORAL at 02:05

## 2025-05-17 RX ADMIN — MUPIROCIN: 20 OINTMENT TOPICAL at 08:05

## 2025-05-17 RX ADMIN — HYDROMORPHONE HYDROCHLORIDE 2 MG: 2 INJECTION INTRAMUSCULAR; INTRAVENOUS; SUBCUTANEOUS at 05:05

## 2025-05-17 RX ADMIN — ATORVASTATIN CALCIUM 40 MG: 40 TABLET, FILM COATED ORAL at 08:05

## 2025-05-17 RX ADMIN — OXYCODONE 15 MG: 5 TABLET ORAL at 08:05

## 2025-05-17 RX ADMIN — ALUMINUM HYDROXIDE, MAGNESIUM HYDROXIDE, AND DIMETHICONE 30 ML: 200; 20; 200 SUSPENSION ORAL at 04:05

## 2025-05-17 RX ADMIN — ACETAMINOPHEN 650 MG: 325 TABLET ORAL at 12:05

## 2025-05-17 RX ADMIN — INSULIN ASPART 20 UNITS: 100 INJECTION, SOLUTION INTRAVENOUS; SUBCUTANEOUS at 11:05

## 2025-05-17 RX ADMIN — INSULIN ASPART 20 UNITS: 100 INJECTION, SOLUTION INTRAVENOUS; SUBCUTANEOUS at 04:05

## 2025-05-17 RX ADMIN — HEPARIN SODIUM 5000 UNITS: 5000 INJECTION INTRAVENOUS; SUBCUTANEOUS at 08:05

## 2025-05-17 RX ADMIN — TACROLIMUS 1 MG: 1 CAPSULE ORAL at 05:05

## 2025-05-17 RX ADMIN — GABAPENTIN 900 MG: 300 CAPSULE ORAL at 02:05

## 2025-05-17 RX ADMIN — HYDROMORPHONE HYDROCHLORIDE 2 MG: 2 INJECTION INTRAMUSCULAR; INTRAVENOUS; SUBCUTANEOUS at 03:05

## 2025-05-17 RX ADMIN — TRAZODONE HYDROCHLORIDE 50 MG: 50 TABLET ORAL at 08:05

## 2025-05-17 RX ADMIN — METHOCARBAMOL 750 MG: 750 TABLET ORAL at 08:05

## 2025-05-17 RX ADMIN — LEVOTHYROXINE SODIUM 88 MCG: 88 TABLET ORAL at 08:05

## 2025-05-17 RX ADMIN — OXYCODONE 15 MG: 5 TABLET ORAL at 10:05

## 2025-05-17 RX ADMIN — TACROLIMUS 2 MG: 1 CAPSULE ORAL at 08:05

## 2025-05-17 RX ADMIN — POLYETHYLENE GLYCOL 3350 17 G: 17 POWDER, FOR SOLUTION ORAL at 08:05

## 2025-05-17 RX ADMIN — ALUMINUM HYDROXIDE, MAGNESIUM HYDROXIDE, AND DIMETHICONE 30 ML: 200; 20; 200 SUSPENSION ORAL at 10:05

## 2025-05-17 RX ADMIN — METOPROLOL SUCCINATE 200 MG: 50 TABLET, EXTENDED RELEASE ORAL at 08:05

## 2025-05-17 RX ADMIN — HYDROMORPHONE HYDROCHLORIDE 2 MG: 2 INJECTION INTRAMUSCULAR; INTRAVENOUS; SUBCUTANEOUS at 11:05

## 2025-05-17 RX ADMIN — ALUMINUM HYDROXIDE, MAGNESIUM HYDROXIDE, AND DIMETHICONE 30 ML: 200; 20; 200 SUSPENSION ORAL at 08:05

## 2025-05-17 RX ADMIN — HYDROMORPHONE HYDROCHLORIDE 2 MG: 2 INJECTION INTRAMUSCULAR; INTRAVENOUS; SUBCUTANEOUS at 07:05

## 2025-05-17 RX ADMIN — INSULIN ASPART 20 UNITS: 100 INJECTION, SOLUTION INTRAVENOUS; SUBCUTANEOUS at 08:05

## 2025-05-17 RX ADMIN — LISINOPRIL 20 MG: 20 TABLET ORAL at 08:05

## 2025-05-17 RX ADMIN — ALUMINUM HYDROXIDE, MAGNESIUM HYDROXIDE, AND DIMETHICONE 30 ML: 200; 20; 200 SUSPENSION ORAL at 06:05

## 2025-05-17 RX ADMIN — OXYCODONE 15 MG: 5 TABLET ORAL at 12:05

## 2025-05-17 RX ADMIN — OXYCODONE 15 MG: 5 TABLET ORAL at 06:05

## 2025-05-17 NOTE — PLAN OF CARE
Pt on RA. Pt getting 2000 mL on incentive spirometry. Pt encouraged to continue deep breathing and coughing. No apparent distress noted. Will continue to monitor.

## 2025-05-17 NOTE — PLAN OF CARE
Problem: Adult Inpatient Plan of Care  Goal: Readiness for Transition of Care  Outcome: Progressing     Problem: Diabetes Comorbidity  Goal: Blood Glucose Level Within Targeted Range  Outcome: Progressing     Problem: Wound  Goal: Optimal Coping  Outcome: Progressing       Problem: Spinal Surgery  Goal: Effective Bowel Elimination  Outcome: Progressing     Problem: Spinal Surgery  Goal: Fluid and Electrolyte Balance  Outcome: Progressing     Problem: Spinal Surgery  Goal: Optimal Pain Control and Function  Outcome: Progressing     Problem: Comorbidity Management  Goal: Maintenance of Heart Failure Symptom Control  Outcome: Progressing   Nightly scheduled and prn medications administered as ordered. All safety precautions secured.

## 2025-05-17 NOTE — NURSING
MD was notified in regards with the abnormal vital signs and decrease O2 of the patient. RN put him an O2 supplementation 2L via NC due to o2 was 88% on room air. MD order Lactic acid test STAT. Patient sat O2 sat went up to 95% via NC. On going monitoring.

## 2025-05-17 NOTE — ASSESSMENT & PLAN NOTE
Patient's blood pressure range in the last 24 hours was: BP  Min: 136/81  Max: 178/74.The patient's inpatient anti-hypertensive regimen is listed below:  Current Antihypertensives  metoprolol succinate (TOPROL-XL) 24 hr tablet 200 mg, Daily, Oral  lisinopriL tablet 20 mg, Daily, Oral    Plan  - BP is controlled, no changes needed to their regimen

## 2025-05-17 NOTE — PT/OT/SLP PROGRESS
"Physical Therapy Treatment    Patient Name:  Vick Gonzalez   MRN:  [id number]    Recommendations:     Discharge Recommendations: High Intensity Therapy  Discharge Equipment Recommendations: walker, rolling, to be determined by next level of care  Barriers to discharge: Inaccessible home     Assessment:     Vick Gonzalez is a 69 y.o. male admitted with a medical diagnosis of Adjacent segment disease of lumbar spine with history of fusion procedure.  He presents with the following impairments/functional limitations: weakness, impaired endurance, impaired self care skills, impaired functional mobility, gait instability, impaired balance, decreased coordination, decreased upper extremity function, decreased lower extremity function, decreased safety awareness, pain, decreased ROM, impaired coordination, impaired skin, edema, orthopedic precautions. Pt able to perform 3 sit to stand transfer trials with RW, LSO donned, requiring max-min a of 2 people. 1st stand max A of 2 people, 2nd  and 3rd stand min A of 2 people with bed height elevated at all three trials. Also, able to complete ~4-5 side steps with RW, LSO donned, requiring mod/max A of 2 people with assistance to advance RW. Would benefit from continued PT services to increase pt's out of bed therapeutic activities and exercises.    Rehab Prognosis: Good; patient would benefit from acute skilled PT services to address these deficits and reach maximum level of function.    Recent Surgery: Procedure(s) (LRB):  ARTHRODESIS, SPINE, LUMBAR, OLIF, MINIMALLY INVASIVE (N/A)  FACETECTOMY (N/A) 2 Days Post-Op    Plan:     During this hospitalization, patient to be seen daily to address the identified rehab impairments via gait training, therapeutic activities, therapeutic exercises, neuromuscular re-education and progress toward the following goals:    Plan of Care Expires:  06/16/25    Subjective     Chief Complaint: None Expressed  Patient/Family Comments/goals: "I " "want to stand up".  Pain/Comfort:         Objective:     Communicated with nurse prior to session.  Patient found HOB elevated with bed alarm, BILL drain, peripheral IV upon PT entry to room.     General Precautions: Standard, fall  Orthopedic Precautions: spinal precautions  Braces: LSO  Respiratory Status: Room air     Functional Mobility:  Bed Mobility:     Rolling Left:  moderate assistance and maximal assistance  Rolling Right: moderate assistance and maximal assistance  Scooting: contact guard assistance and to EOB  Supine to Sit: maximal assistance  Sit to Supine: maximal assistance, of 2 persons, and assistance to advance BLE's  Transfers:     Sit to Stand:  minimum assistance, maximal assistance, of 2 persons, and 1st stand trial max A of 2 people, 2nd and 3rd stand trial min A of 2 people with bed height elevated with rolling walker  Gait: Pt able to take ~4-5 side steps with RW, LSO donned, requiring mod/max A of 2 people with assistance to advance RW      AM-PAC 6 CLICK MOBILITY  Turning over in bed (including adjusting bedclothes, sheets and blankets)?: 3  Sitting down on and standing up from a chair with arms (e.g., wheelchair, bedside commode, etc.): 2  Moving from lying on back to sitting on the side of the bed?: 2  Moving to and from a bed to a chair (including a wheelchair)?: 2  Need to walk in hospital room?: 2  Climbing 3-5 steps with a railing?: 1  Basic Mobility Total Score: 12       Treatment & Education:  Reiterated to pt in the importance of performing  a log roll to get out of bed. While sitting at EOB pt required max A to fausto LSO. Performed 3 sit to stand trials with RW, LSO donned, requiring at 1st trial max A of 2 people, at 2nd and 3rd trial min A of 2 people with bed height elevated at each trial. During static standing pt with occasional slouching which he attributed to increased weakness which required verbal and tactile cueing to correct and maintain upright posture. Pt instructed in " and performed weight shifting/marching in place within RW boundaries requiring mod A of 1 and another person CGA for pt safety with OT blocking pt's left arm for increased LUE stability/strength. Pt initially started with a weight shifting then able to slightly lift of feet off of floor. However, with increasing reps pt able to increase his foot to floor clearance. 1 set 1 x 5 marches each BLE, 2nd set 1 x 8 reps. Also, able to take ~4-5 side steps with RW, LSO donned, requiring mod/max A of 2 people with assistance to advance RW.     Patient left HOB elevated with all lines intact, call button in reach, bed alarm on, and nurse notified..    GOALS:   Multidisciplinary Problems       Physical Therapy Goals          Problem: Physical Therapy    Goal Priority Disciplines Outcome Interventions   Physical Therapy Goal     PT, PT/OT Progressing    Description: Goals to be met by: discharge date     Patient will increase functional independence with mobility by performin. Supine to sit with Modified [place]  2. Sit to supine with Modified [place]  3. Rolling to Left and Right with Modified [place].  4. Sit to stand transfer with Modified [place]  5. Bed to chair transfer with Stand-by Assistance using Rolling Walker  6. Gait  x 150 feet with Stand-by Assistance using Rolling Walker.   7.  Able to don/doff LSO with mod independence                         DME Justifications:   Vick's mobility limitation cannot be sufficiently resolved by the use of a cane. His functional mobility deficit can be sufficiently resolved with the use of a Rolling Walker. Patient's mobility limitation significantly impairs their ability to participate in one of more activities of daily living.  The use of a RW will significantly improve the patient's ability to participate in MRADLS and the patient will use it on regular basis in the home.     Time Tracking:     PT Received On: 25  PT Start Time: 1201     PT  Stop Time: 1255  PT Total Time (min): 54 min     Billable Minutes: Therapeutic Activity 29 and Neuromuscular Re-education 25    Treatment Type: Treatment  PT/PTA: PTA     Number of PTA visits since last PT visit: 1 05/17/2025

## 2025-05-17 NOTE — PROGRESS NOTES
[facility] Medicine  Progress Note    Patient Name: Vick Gonzalez  MRN: [id number]  Patient Class: IP- Inpatient   Admission Date: 5/15/2025  Length of Stay: 2 days  Attending Physician: Khadar Payne MD  Primary Care Provider: Emanuel Lopez MD        Subjective     Principal Problem:Adjacent segment disease of lumbar spine with history of fusion procedure        HPI:  Vick Gonzalez is a chronically ill 70-year-old male admitted to [facility] today for adjacent segment disease of the lumbar spine.  The patient has a long complex history of chronic lower back pain and has developed neurogenic claudication.  He is followed in outpatient setting by Dr. Khadar Payne.  On today, the patient underwent Left L4-5 oblique interbody fusion, placement of interbody spacer, DePuy Conduit LLIF spacer filled with allograft BMP and DBM, L4-S1 posterior segmental instrumentation using DePuy Viper Prime system, and  L4-5 laminectomy, medial facetectomy, foraminotomy.  Due to the severity of the patient's underlying comorbidities, Hospital Medicine was consulted for medical management.    Overview/Hospital Course:  No notes on file    Interval History:  Seen at the bedside.  No distress noted.  We will continue to follow along with primary team    Review of Systems   Constitutional:  Positive for activity change and fever.   HENT:  Negative for trouble swallowing.    Musculoskeletal:  Positive for arthralgias, back pain, gait problem, myalgias and neck pain.   Neurological:  Positive for numbness (Chronic).   Psychiatric/Behavioral:  Negative for confusion.      Objective:     Vital Signs (Most Recent):  Temp: 99 °F (37.2 °C) (05/17/25 1108)  Pulse: 91 (05/17/25 1108)  Resp: 18 (05/17/25 1111)  BP: (!) 147/79 (05/17/25 1108)  SpO2: 95 % (05/17/25 1108) Vital Signs (24h Range):  Temp:  [98.5 °F (36.9 °C)-100.1 °F (37.8 °C)] 99 °F (37.2 °C)  Pulse:  [] 91  Resp:  [16-20] 18  SpO2:  [94 %-98 %] 95  %  BP: (136-178)/(73-81) 147/79     Weight: 116.1 kg (255 lb 15.3 oz)  Body mass index is 32.86 kg/m².    Intake/Output Summary (Last 24 hours) at 5/17/2025 1327  Last data filed at 5/17/2025 1259  Gross per 24 hour   Intake 240 ml   Output 335 ml   Net -95 ml         Physical Exam  Vitals and nursing note reviewed.   Constitutional:       Appearance: He is ill-appearing.   HENT:      Mouth/Throat:      Mouth: Mucous membranes are moist.   Eyes:      Extraocular Movements: Extraocular movements intact.   Cardiovascular:      Rate and Rhythm: Normal rate and regular rhythm.   Pulmonary:      Effort: Pulmonary effort is normal.   Neurological:      Mental Status: He is alert.      Motor: Weakness present.               Significant Labs: All pertinent labs within the past 24 hours have been reviewed.    Significant Imaging: I have reviewed all pertinent imaging results/findings within the past 24 hours.      Assessment & Plan  Adjacent segment disease of lumbar spine with history of fusion procedure  -Defer to neurosurgery to manage    Chronic pain syndrome  -Analgesic therapy as needed    Acquired hypothyroidism  -resume levothyroxine 88 mcg daily    Gout, unspecified  -Resume allopurinol    Essential hypertension  Patient's blood pressure range in the last 24 hours was: BP  Min: 136/81  Max: 178/74.The patient's inpatient anti-hypertensive regimen is listed below:  Current Antihypertensives  metoprolol succinate (TOPROL-XL) 24 hr tablet 200 mg, Daily, Oral  lisinopriL tablet 20 mg, Daily, Oral    Plan  - BP is controlled, no changes needed to their regimen  Type 2 diabetes mellitus with diabetic polyneuropathy, with long-term current use of insulin  -Resume home insulin  -Accu-Cheks AC and HS    S/P liver transplant  -Resume Prograf    Hypertriglyceridemia  -Resume home medications    CKD (chronic kidney disease), stage III  Creatine stable for now. BMP reviewed- noted Estimated Creatinine Clearance: 88.3 mL/min  (based on SCr of 1.1 mg/dL). according to latest data. Based on current GFR, CKD stage is stage 3 - GFR 30-59.  Monitor UOP and serial BMP and adjust therapy as needed. Renally dose meds. Avoid nephrotoxic medications and procedures.  Spinal stenosis of lumbar region with neurogenic claudication  Per primary team    Type 2 diabetes mellitus, with long-term current use of insulin  Holding home meds  We will order insulin siding scale here in the hospital      VTE Risk Mitigation (From admission, onward)           Ordered     heparin (porcine) injection 5,000 Units  Every 12 hours         05/15/25 1625     IP VTE HIGH RISK PATIENT  Once         05/15/25 1625     Place sequential compression device  Until discontinued         05/15/25 1625                    Discharge Planning   JOJO: 5/20/2025     Code Status: Full Code   Medical Readiness for Discharge Date:   Discharge Plan A: Home, Home with family (Outpt therapy)                Please place Justification for DME        Sue Suarez NP  Department of Hospital Medicine   [facility]

## 2025-05-17 NOTE — PT/OT/SLP PROGRESS
Occupational Therapy   Treatment    Name: Vick Gonzalez  MRN: [id number]  Admitting Diagnosis:  Adjacent segment disease of lumbar spine with history of fusion procedure  2 Days Post-Op    Recommendations:     Discharge Recommendations: High Intensity Therapy  Discharge Equipment Recommendations:  likely walker, rolling, bedside commode, wheelchair, bath bench but may defer to next level of care  Barriers to discharge:  Inaccessible home environment, Decreased caregiver support    Assessment:     Vick Gonzalez is a 69 y.o. male with a medical diagnosis of Adjacent segment disease of lumbar spine with history of fusion procedure.  He presents with improving tolerance for OOB activity, able to take steps with increased independence this session but continues with neurological weakness BLE and BUE. Overall neurological weakness improved with increased attention to each step of motoric task and continued task practice but notably increased with inattention to task, such as BLE increased with divided attention to attempt steps . Performance deficits affecting function are weakness, impaired endurance, impaired sensation, impaired self care skills, impaired functional mobility, gait instability, impaired balance, decreased upper extremity function, decreased lower extremity function, abnormal tone, orthopedic precautions.     Rehab Prognosis:  Good; patient would benefit from acute skilled OT services to address these deficits and reach maximum level of function.       Plan:     Patient to be seen 5 x/week to address the above listed problems via self-care/home management, therapeutic activities, therapeutic exercises, neuromuscular re-education  Plan of Care Expires: 06/16/25  Plan of Care Reviewed with: patient    Subjective     Chief Complaint: Pt reports BUE/BLE weakness, states that he wasn't doing much at therapy prior to surgical intervention  Patient/Family Comments/goals: To get stronger, to be able to  walk  Pain/Comfort:  Pain Rating 1:  (did not rate)  Location - Orientation 1: lower  Location 1: back  Pain Addressed 1: Reposition, Distraction, Cessation of Activity  Pain Rating Post-Intervention 1:  (did not rate)    Objective:     Communicated with: nsg prior to session.  Patient found sitting edge of bed with bed alarm, peripheral IV, BILL drain and PT present upon OT entry to room.    General Precautions: Standard, fall    Orthopedic Precautions:spinal precautions  Braces: LSO  Respiratory Status: Room air     Occupational Performance:     Bed Mobility:    Patient completed Sit to Supine with maximal assistance and 2 persons     Functional Mobility/Transfers:  Sit to Stand:  minimum assistance, maximal assistance, of 2 persons, and 1st stand trial max A of 2 people, 2nd and 3rd stand trial min A of 2 people with bed height elevated with rolling walker  Functional Mobility: Pt with fair dynamic seated balance; pt with fair- to poor+ static standing balance at times but improved to fair/fair- with increased v/c for attention to specific motoric tasks (such as locking BUE into extension on RW, full upright stance with glute and quad activation.)    Activities of Daily Living:  Upper Body Dressing: maximal assistance to doff LSO seated EOB      [facility] 6 Click ADL: 17    Treatment & Education:  Pt educated on role of OT and POC.   Pt performing skills as listed above.     Patient left HOB elevated with all lines intact, call button in reach, bed alarm on, and nsg notified    GOALS:   Multidisciplinary Problems       Occupational Therapy Goals          Problem: Occupational Therapy    Goal Priority Disciplines Outcome Interventions   Occupational Therapy Goal     OT, PT/OT Progressing    Description: Goals to be met by: 6/16/25     Patient will increase functional independence with ADLs by performing:    LE Dressing with Stand-by Assistance using reacher.  Grooming while EOB with Set-up Assistance.  Toileting from  toilet with Minimal Assistance for hygiene and clothing management.   Rolling to Bilateral with Stand-by Assistance.   Supine to sit with Minimal Assistance.  Stand pivot transfers with Minimal Assistance.  Toilet transfer to bedside commode with Minimal Assistance.                         DME Justifications:   Vick requires a commode for home use because he is confined to a single room.  Vick Gonzalez has a mobility limitation that significantly impairs his ability to participate in one or more mobility related activities of daily living (MRADLs) such as toileting, feeding, dressing, grooming, and bathing in customary locations in the home.  The mobility limitation cannot be sufficiently resolved by the use of a cane or walker.   The use of a manual wheelchair will significantly improve the patients ability to participate in MRADLS and the patient will use it on regular basis in the home.  Vick Gonzalez has expressed his willingness to use a manual wheelchair in the home. Patients upper body strength is sufficient for propulsion.  He also has a caregiver who is available, willing, and able to provide assistance with the wheelchair when needed.     Vick's mobility limitation cannot be sufficiently resolved by the use of a cane. His functional mobility deficit can be sufficiently resolved with the use of a Rolling Walker. Patient's mobility limitation significantly impairs their ability to participate in one of more activities of daily living.  The use of a RW will significantly improve the patient's ability to participate in MRADLS and the patient will use it on regular basis in the home.    Time Tracking:     OT Date of Treatment: 05/17/25  OT Start Time: 1231  OT Stop Time: 1255  OT Total Time (min): 24 min    Billable Minutes:Therapeutic Activity 24    OT/CASSANDRA: OT     Number of CASSANDRA visits since last OT visit: 0    5/17/2025

## 2025-05-17 NOTE — ASSESSMENT & PLAN NOTE
Creatine stable for now. BMP reviewed- noted Estimated Creatinine Clearance: 88.3 mL/min (based on SCr of 1.1 mg/dL). according to latest data. Based on current GFR, CKD stage is stage 3 - GFR 30-59.  Monitor UOP and serial BMP and adjust therapy as needed. Renally dose meds. Avoid nephrotoxic medications and procedures.

## 2025-05-17 NOTE — SUBJECTIVE & OBJECTIVE
Interval History:  Seen at the bedside.  No distress noted.  We will continue to follow along with primary team    Review of Systems   Constitutional:  Positive for activity change and fever.   HENT:  Negative for trouble swallowing.    Musculoskeletal:  Positive for arthralgias, back pain, gait problem, myalgias and neck pain.   Neurological:  Positive for numbness (Chronic).   Psychiatric/Behavioral:  Negative for confusion.      Objective:     Vital Signs (Most Recent):  Temp: 99 °F (37.2 °C) (05/17/25 1108)  Pulse: 91 (05/17/25 1108)  Resp: 18 (05/17/25 1111)  BP: (!) 147/79 (05/17/25 1108)  SpO2: 95 % (05/17/25 1108) Vital Signs (24h Range):  Temp:  [98.5 °F (36.9 °C)-100.1 °F (37.8 °C)] 99 °F (37.2 °C)  Pulse:  [] 91  Resp:  [16-20] 18  SpO2:  [94 %-98 %] 95 %  BP: (136-178)/(73-81) 147/79     Weight: 116.1 kg (255 lb 15.3 oz)  Body mass index is 32.86 kg/m².    Intake/Output Summary (Last 24 hours) at 5/17/2025 1327  Last data filed at 5/17/2025 1259  Gross per 24 hour   Intake 240 ml   Output 335 ml   Net -95 ml         Physical Exam  Vitals and nursing note reviewed.   Constitutional:       Appearance: He is ill-appearing.   HENT:      Mouth/Throat:      Mouth: Mucous membranes are moist.   Eyes:      Extraocular Movements: Extraocular movements intact.   Cardiovascular:      Rate and Rhythm: Normal rate and regular rhythm.   Pulmonary:      Effort: Pulmonary effort is normal.   Neurological:      Mental Status: He is alert.      Motor: Weakness present.               Significant Labs: All pertinent labs within the past 24 hours have been reviewed.    Significant Imaging: I have reviewed all pertinent imaging results/findings within the past 24 hours.

## 2025-05-17 NOTE — PROGRESS NOTES
NEUROSURGICAL POST-OPERATIVE PROGRESS NOTE    DATE OF SERVICE:  05/17/2025      ATTENDING PHYSICIAN:  Khadar Payne MD    SUBJECTIVE:    INTERIM HISTORY:    This is a very pleasant 69 y.o. y.o. male, who is status postop day 2 L4-5 oblique interbody fusion and L4-S1 posterior instrumented fusion, L4-5 minimally invasive laminectomy.  Patient did walk with physical therapy yesterday.  Complains of left lower quadrant discomfort.  No bowel movement yet.  Says that pain is controlled with current pain medicine regimen.              OBJECTIVE:    PHYSICAL EXAMINATION:   Vitals:    05/17/25 1111   BP:    Pulse:    Resp: 18   Temp:        Physical Exam:  Vitals reviewed.    Constitutional: He appears well-developed and well-nourished.     Eyes: Pupils are equal, round, and reactive to light. Conjunctivae and EOM are normal.     Cardiovascular: Normal distal pulses and no edema.     Abdominal: Soft.     Skin: Skin displays no rash on trunk and no rash on extremities. Skin displays no lesions on trunk and no lesions on extremities.     Psych/Behavior: He is alert. He is oriented to person, place, and time. He has a normal mood and affect.     Musculoskeletal:        Neck: Range of motion is limited.       Ortho Exam    Neurological Exam  Mental Status  Alert. Oriented to person, place, and time.    Cranial Nerves  CN III, IV, VI: Extraocular movements intact bilaterally. Pupils equal round and reactive to light bilaterally.          DIAGNOSTIC DATA:    Labs reviewed  Lumbar x-ray pending    ASSESSMENT:    This is a 69 y.o. male who is s/p day 2 L4-S1 fusion.    Problem List Items Addressed This Visit          Neuro    * (Principal) Adjacent segment disease of lumbar spine with history of fusion procedure - Primary    Overview   -S/P Left L4-5 oblique interbody fusion, placement of interbody spacer, DePuy Conduit LLIF spacer filled with allograft BMP and DBM, L4-S1 posterior segmental instrumentation using DePuy Viper  Prime system,L4-5 laminectomy, medial facetectomy, foraminotomy.         Relevant Orders    Admit to Inpatient (Completed)    Vital signs    Turn cough deep breathe    Oral Care    Neurovascular checks:  RLE, LLE    Intake and output    Place sequential compression device    Chlorohexidine Bath    Incentive spirometry    Pulse Oximetry Q4H    PT evaluate and treat    OT evaluate and treat    Transfer patient (Completed)    PT assistance with ambulation    LSO brace    Patient must wear orthosis while sitting or standing    Diet Consistent Carbohydrate 2000 Calories (up to 75 gm per meal)    CBC auto differential (Completed)    Basic metabolic panel (Completed)    X-Ray Lumbar Spine Ap And Lateral    Drain - full suction (Completed)    Document drain output    Misc nursing order (specify)    CBC Auto Differential    Basic Metabolic Panel    Spinal stenosis of lumbar region with neurogenic claudication    Relevant Orders    Vital signs    Turn cough deep breathe    Oral Care    Neurovascular checks:  RLE, LLE    Intake and output    Place sequential compression device    Chlorohexidine Bath    Incentive spirometry    Pulse Oximetry Q4H    PT evaluate and treat    OT evaluate and treat    Transfer patient (Completed)    PT assistance with ambulation    LSO brace    Patient must wear orthosis while sitting or standing    Diet Consistent Carbohydrate 2000 Calories (up to 75 gm per meal)    CBC auto differential (Completed)    Basic metabolic panel (Completed)    X-Ray Lumbar Spine Ap And Lateral    Drain - full suction (Completed)    Document drain output       Renal/    CKD (chronic kidney disease), stage III (Chronic)    Relevant Orders    Admit to Inpatient (Completed)    Vital signs    Turn cough deep breathe    Oral Care    Neurovascular checks:  RLE, LLE    Intake and output    Place sequential compression device    Chlorohexidine Bath    Incentive spirometry    Pulse Oximetry Q4H    PT evaluate and treat    OT  evaluate and treat    Transfer patient (Completed)    PT assistance with ambulation    LSO brace    Patient must wear orthosis while sitting or standing    Diet Consistent Carbohydrate 2000 Calories (up to 75 gm per meal)    CBC auto differential (Completed)    Basic metabolic panel (Completed)    X-Ray Lumbar Spine Ap And Lateral    Drain - full suction (Completed)    Document drain output       Endocrine    Type 2 diabetes mellitus with diabetic polyneuropathy, with long-term current use of insulin (Chronic)    Relevant Medications    insulin aspart U-100 pen 20 Units    insulin glargine U-100 (Lantus) pen 20 Units    Other Relevant Orders    Vital signs    Turn cough deep breathe    Oral Care    Neurovascular checks:  RLE, LLE    Intake and output    Place sequential compression device    Chlorohexidine Bath    Incentive spirometry    Pulse Oximetry Q4H    PT evaluate and treat    OT evaluate and treat    Transfer patient (Completed)    PT assistance with ambulation    LSO brace    Patient must wear orthosis while sitting or standing    Diet Consistent Carbohydrate 2000 Calories (up to 75 gm per meal)    CBC auto differential (Completed)    Basic metabolic panel (Completed)    X-Ray Lumbar Spine Ap And Lateral    Drain - full suction (Completed)    Document drain output    Full code    Inpatient consult to [facility]    POCT glucose       PLAN:    Continue to mobilize with physical therapy  Appreciating hospital medicine managing blood sugar  Stool softener      Khadar Payne MD  Pager: [phone]

## 2025-05-17 NOTE — PLAN OF CARE
Assumed care of patient. Patient in no apparent distress on room air. Patient had c/o pain throughout shift, PRN medication given around the clock. Patient worked with therapy. Patient sat up on side of the bed with increased pain. Patient had a bowel movement this shift. Medications administered via MAR. Bed alarm set, call light within reach, safety precautions maintained.    Problem: Adult Inpatient Plan of Care  Goal: Plan of Care Review  Outcome: Progressing  Goal: Patient-Specific Goal (Individualized)  Outcome: Progressing  Goal: Absence of Hospital-Acquired Illness or Injury  Outcome: Progressing  Goal: Optimal Comfort and Wellbeing  Outcome: Progressing  Goal: Readiness for Transition of Care  Outcome: Progressing     Problem: Diabetes Comorbidity  Goal: Blood Glucose Level Within Targeted Range  Outcome: Progressing     Problem: Wound  Goal: Optimal Coping  Outcome: Progressing  Goal: Optimal Functional Ability  Outcome: Progressing  Goal: Absence of Infection Signs and Symptoms  Outcome: Progressing  Goal: Improved Oral Intake  Outcome: Progressing  Goal: Optimal Pain Control and Function  Outcome: Progressing  Goal: Skin Health and Integrity  Outcome: Progressing  Goal: Optimal Wound Healing  Outcome: Progressing     Problem: Infection  Goal: Absence of Infection Signs and Symptoms  Outcome: Progressing     Problem: Spinal Surgery  Goal: Optimal Coping with Surgery  Outcome: Progressing  Goal: Absence of Bleeding  Outcome: Progressing  Goal: Effective Bowel Elimination  Outcome: Progressing  Goal: Fluid and Electrolyte Balance  Outcome: Progressing  Goal: Optimal Functional Ability  Outcome: Progressing  Goal: Absence of Infection Signs and Symptoms  Outcome: Progressing  Goal: Optimal Neurologic Function  Outcome: Progressing  Goal: Anesthesia/Sedation Recovery  Outcome: Progressing  Goal: Optimal Pain Control and Function  Outcome: Progressing  Goal: Nausea and Vomiting Relief  Outcome:  Progressing  Goal: Effective Urinary Elimination  Outcome: Progressing  Goal: Effective Oxygenation and Ventilation  Outcome: Progressing     Problem: Pain Acute  Goal: Optimal Pain Control and Function  Outcome: Progressing     Problem: Skin Injury Risk Increased  Goal: Skin Health and Integrity  Outcome: Progressing     Problem: Fall Injury Risk  Goal: Absence of Fall and Fall-Related Injury  Outcome: Progressing     Problem: Comorbidity Management  Goal: Maintenance of Heart Failure Symptom Control  Outcome: Progressing  Goal: Blood Pressure in Desired Range  Outcome: Progressing

## 2025-05-18 LAB
ABSOLUTE EOSINOPHIL (OHS): 0.64 K/UL
ABSOLUTE MONOCYTE (OHS): 0.86 K/UL (ref 0.3–1)
ABSOLUTE NEUTROPHIL COUNT (OHS): 7.23 K/UL (ref 1.8–7.7)
ANION GAP (OHS): 8 MMOL/L (ref 8–16)
BASOPHILS # BLD AUTO: 0.04 K/UL
BASOPHILS NFR BLD AUTO: 0.4 %
BUN SERPL-MCNC: 19 MG/DL (ref 8–23)
CALCIUM SERPL-MCNC: 8.8 MG/DL (ref 8.7–10.5)
CHLORIDE SERPL-SCNC: 102 MMOL/L (ref 95–110)
CO2 SERPL-SCNC: 27 MMOL/L (ref 23–29)
CREAT SERPL-MCNC: 1.3 MG/DL (ref 0.5–1.4)
ERYTHROCYTE [DISTWIDTH] IN BLOOD BY AUTOMATED COUNT: 13.6 % (ref 11.5–14.5)
GFR SERPLBLD CREATININE-BSD FMLA CKD-EPI: 60 ML/MIN/1.73/M2
GLUCOSE SERPL-MCNC: 115 MG/DL (ref 70–110)
HCT VFR BLD AUTO: 27.4 % (ref 40–54)
HGB BLD-MCNC: 8.9 GM/DL (ref 14–18)
IMM GRANULOCYTES # BLD AUTO: 0.04 K/UL (ref 0–0.04)
IMM GRANULOCYTES NFR BLD AUTO: 0.4 % (ref 0–0.5)
LYMPHOCYTES # BLD AUTO: 2.3 K/UL (ref 1–4.8)
MCH RBC QN AUTO: 29.1 PG (ref 27–31)
MCHC RBC AUTO-ENTMCNC: 32.5 G/DL (ref 32–36)
MCV RBC AUTO: 90 FL (ref 82–98)
NUCLEATED RBC (/100WBC) (OHS): 0 /100 WBC
PLATELET # BLD AUTO: 127 K/UL (ref 150–450)
PMV BLD AUTO: 11.8 FL (ref 9.2–12.9)
POCT GLUCOSE: 172 MG/DL (ref 70–110)
POCT GLUCOSE: 181 MG/DL (ref 70–110)
POCT GLUCOSE: 64 MG/DL (ref 70–110)
POCT GLUCOSE: 84 MG/DL (ref 70–110)
POTASSIUM SERPL-SCNC: 4.2 MMOL/L (ref 3.5–5.1)
RBC # BLD AUTO: 3.06 M/UL (ref 4.6–6.2)
RELATIVE EOSINOPHIL (OHS): 5.8 %
RELATIVE LYMPHOCYTE (OHS): 20.7 % (ref 18–48)
RELATIVE MONOCYTE (OHS): 7.7 % (ref 4–15)
RELATIVE NEUTROPHIL (OHS): 65 % (ref 38–73)
SODIUM SERPL-SCNC: 137 MMOL/L (ref 136–145)
TACROLIMUS BLD-MCNC: 6.2 NG/ML (ref 5–15)
WBC # BLD AUTO: 11.11 K/UL (ref 3.9–12.7)

## 2025-05-18 PROCEDURE — 63600175 PHARM REV CODE 636 W HCPCS: Performed by: NEUROLOGICAL SURGERY

## 2025-05-18 PROCEDURE — 36415 COLL VENOUS BLD VENIPUNCTURE: CPT | Performed by: PHYSICIAN ASSISTANT

## 2025-05-18 PROCEDURE — 94761 N-INVAS EAR/PLS OXIMETRY MLT: CPT

## 2025-05-18 PROCEDURE — 94799 UNLISTED PULMONARY SVC/PX: CPT

## 2025-05-18 PROCEDURE — 99900035 HC TECH TIME PER 15 MIN (STAT)

## 2025-05-18 PROCEDURE — 80048 BASIC METABOLIC PNL TOTAL CA: CPT | Performed by: PHYSICIAN ASSISTANT

## 2025-05-18 PROCEDURE — 11000001 HC ACUTE MED/SURG PRIVATE ROOM

## 2025-05-18 PROCEDURE — 85025 COMPLETE CBC W/AUTO DIFF WBC: CPT | Performed by: PHYSICIAN ASSISTANT

## 2025-05-18 PROCEDURE — 97110 THERAPEUTIC EXERCISES: CPT | Mod: CQ

## 2025-05-18 PROCEDURE — 25000003 PHARM REV CODE 250: Performed by: NURSE PRACTITIONER

## 2025-05-18 PROCEDURE — 25000003 PHARM REV CODE 250: Performed by: NEUROLOGICAL SURGERY

## 2025-05-18 RX ADMIN — HYDROMORPHONE HYDROCHLORIDE 2 MG: 2 INJECTION INTRAMUSCULAR; INTRAVENOUS; SUBCUTANEOUS at 12:05

## 2025-05-18 RX ADMIN — INSULIN ASPART 20 UNITS: 100 INJECTION, SOLUTION INTRAVENOUS; SUBCUTANEOUS at 08:05

## 2025-05-18 RX ADMIN — ALLOPURINOL 100 MG: 100 TABLET ORAL at 08:05

## 2025-05-18 RX ADMIN — ATORVASTATIN CALCIUM 40 MG: 40 TABLET, FILM COATED ORAL at 08:05

## 2025-05-18 RX ADMIN — HYDROMORPHONE HYDROCHLORIDE 2 MG: 2 INJECTION INTRAMUSCULAR; INTRAVENOUS; SUBCUTANEOUS at 08:05

## 2025-05-18 RX ADMIN — TRAZODONE HYDROCHLORIDE 50 MG: 50 TABLET ORAL at 08:05

## 2025-05-18 RX ADMIN — ACETAMINOPHEN 650 MG: 325 TABLET ORAL at 05:05

## 2025-05-18 RX ADMIN — LEVOTHYROXINE SODIUM 88 MCG: 88 TABLET ORAL at 08:05

## 2025-05-18 RX ADMIN — OXYCODONE 15 MG: 5 TABLET ORAL at 08:05

## 2025-05-18 RX ADMIN — OXYCODONE 15 MG: 5 TABLET ORAL at 11:05

## 2025-05-18 RX ADMIN — TACROLIMUS 1 MG: 1 CAPSULE ORAL at 05:05

## 2025-05-18 RX ADMIN — INSULIN ASPART 20 UNITS: 100 INJECTION, SOLUTION INTRAVENOUS; SUBCUTANEOUS at 04:05

## 2025-05-18 RX ADMIN — MUPIROCIN: 20 OINTMENT TOPICAL at 08:05

## 2025-05-18 RX ADMIN — TACROLIMUS 2 MG: 1 CAPSULE ORAL at 08:05

## 2025-05-18 RX ADMIN — ALUMINUM HYDROXIDE, MAGNESIUM HYDROXIDE, AND DIMETHICONE 30 ML: 200; 20; 200 SUSPENSION ORAL at 08:05

## 2025-05-18 RX ADMIN — HEPARIN SODIUM 5000 UNITS: 5000 INJECTION INTRAVENOUS; SUBCUTANEOUS at 08:05

## 2025-05-18 RX ADMIN — OXYCODONE 15 MG: 5 TABLET ORAL at 03:05

## 2025-05-18 RX ADMIN — GABAPENTIN 900 MG: 300 CAPSULE ORAL at 03:05

## 2025-05-18 RX ADMIN — METHOCARBAMOL 750 MG: 750 TABLET ORAL at 08:05

## 2025-05-18 RX ADMIN — ACETAMINOPHEN 650 MG: 325 TABLET ORAL at 06:05

## 2025-05-18 RX ADMIN — METHOCARBAMOL 750 MG: 750 TABLET ORAL at 03:05

## 2025-05-18 RX ADMIN — INSULIN GLARGINE 20 UNITS: 100 INJECTION, SOLUTION SUBCUTANEOUS at 08:05

## 2025-05-18 RX ADMIN — GABAPENTIN 900 MG: 300 CAPSULE ORAL at 08:05

## 2025-05-18 RX ADMIN — LISINOPRIL 20 MG: 20 TABLET ORAL at 08:05

## 2025-05-18 RX ADMIN — ACETAMINOPHEN 650 MG: 325 TABLET ORAL at 11:05

## 2025-05-18 RX ADMIN — HYDROMORPHONE HYDROCHLORIDE 2 MG: 2 INJECTION INTRAMUSCULAR; INTRAVENOUS; SUBCUTANEOUS at 05:05

## 2025-05-18 RX ADMIN — ALUMINUM HYDROXIDE, MAGNESIUM HYDROXIDE, AND DIMETHICONE 30 ML: 200; 20; 200 SUSPENSION ORAL at 11:05

## 2025-05-18 RX ADMIN — ALUMINUM HYDROXIDE, MAGNESIUM HYDROXIDE, AND DIMETHICONE 30 ML: 200; 20; 200 SUSPENSION ORAL at 04:05

## 2025-05-18 RX ADMIN — METOPROLOL SUCCINATE 200 MG: 50 TABLET, EXTENDED RELEASE ORAL at 08:05

## 2025-05-18 RX ADMIN — ALUMINUM HYDROXIDE, MAGNESIUM HYDROXIDE, AND DIMETHICONE 30 ML: 200; 20; 200 SUSPENSION ORAL at 06:05

## 2025-05-18 RX ADMIN — ACETAMINOPHEN 650 MG: 325 TABLET ORAL at 12:05

## 2025-05-18 RX ADMIN — POLYETHYLENE GLYCOL 3350 17 G: 17 POWDER, FOR SOLUTION ORAL at 08:05

## 2025-05-18 RX ADMIN — OXYCODONE 15 MG: 5 TABLET ORAL at 04:05

## 2025-05-18 NOTE — PT/OT/SLP PROGRESS
Physical Therapy Treatment    Patient Name:  Vick Gonzalez   MRN:  [id number]    Recommendations:     Discharge Recommendations: High Intensity Therapy  Discharge Equipment Recommendations: walker, rolling, to be determined by next level of care  Barriers to discharge: decreased mobility,strength and endurance    Assessment:     Vick Gonzalez is a 69 y.o. male admitted with a medical diagnosis of Adjacent segment disease of lumbar spine with history of fusion procedure.  He presents with the following impairments/functional limitations: weakness, impaired endurance, impaired functional mobility, gait instability, impaired balance, decreased lower extremity function, pain, decreased ROM, impaired coordination, impaired skin, impaired joint extensibility, orthopedic precautions,pt with good participation and requires A X 2 at this time for mobility and attempted transfers,pt will benefit from high intensity therapy upon discharge.    Rehab Prognosis: Fair; patient would benefit from acute skilled PT services to address these deficits and reach maximum level of function.    Recent Surgery: Procedure(s) (LRB):  ARTHRODESIS, SPINE, LUMBAR, OLIF, MINIMALLY INVASIVE (N/A)  FACETECTOMY (N/A) 3 Days Post-Op    Plan:     During this hospitalization, patient to be seen daily to address the identified rehab impairments via gait training, therapeutic activities, therapeutic exercises, neuromuscular re-education and progress toward the following goals:    Plan of Care Expires:  06/16/25    Subjective     Chief Complaint: n/a  Patient/Family Comments/goals: pt is doing what he can.  Pain/Comfort:  Pain Rating 1:  (no rating)  Location - Side 1: Bilateral  Location - Orientation 1: lower  Location 1: back  Pain Addressed 1: Reposition, Distraction, Cessation of Activity      Objective:     Communicated with nsg prior to session.  Patient found supine with bed alarm, peripheral IV, BILL drain upon PT entry to room.     General  Precautions: Standard, fall  Orthopedic Precautions: spinal precautions  Braces: LSO  Respiratory Status: Room air     Functional Mobility:  Gait: n/a      AM-PAC 6 CLICK MOBILITY  Turning over in bed (including adjusting bedclothes, sheets and blankets)?: 3  Sitting down on and standing up from a chair with arms (e.g., wheelchair, bedside commode, etc.): 2  Moving from lying on back to sitting on the side of the bed?: 2  Moving to and from a bed to a chair (including a wheelchair)?: 2  Need to walk in hospital room?: 2  Climbing 3-5 steps with a railing?: 1  Basic Mobility Total Score: 12       Treatment & Education: performed B le AA supine ex's X 10 reps with rest periods,pt repositioned for comfort and to HOB with Dep A X 3,requires A X 2 for co-rx tomorrow.      Patient left supine with all lines intact, call button in reach, and bed alarm on..    GOALS:   Multidisciplinary Problems       Physical Therapy Goals          Problem: Physical Therapy    Goal Priority Disciplines Outcome Interventions   Physical Therapy Goal     PT, PT/OT Progressing    Description: Goals to be met by: discharge date     Patient will increase functional independence with mobility by performin. Supine to sit with Modified [place]  2. Sit to supine with Modified [place]  3. Rolling to Left and Right with Modified [place].  4. Sit to stand transfer with Modified [place]  5. Bed to chair transfer with Stand-by Assistance using Rolling Walker  6. Gait  x 150 feet with Stand-by Assistance using Rolling Walker.   7.  Able to don/doff LSO with mod independence                         DME Justifications:  No DME recommended requiring DME justifications    Time Tracking:     PT Received On: 25  PT Start Time: 922     PT Stop Time: 947  PT Total Time (min): 25 min     Billable Minutes: Therapeutic Activity 25    Treatment Type: Treatment  PT/PTA: PTA     Number of PTA visits since last PT visit: 2      05/18/2025

## 2025-05-18 NOTE — PLAN OF CARE
Problem: Adult Inpatient Plan of Care  Goal: Absence of Hospital-Acquired Illness or Injury  Outcome: Progressing     Problem: Diabetes Comorbidity  Goal: Blood Glucose Level Within Targeted Range  Outcome: Progressing     Problem: Wound  Goal: Improved Oral Intake  Outcome: Progressing     Problem: Spinal Surgery  Goal: Absence of Bleeding  Outcome: Progressing    Problem: Comorbidity Management  Goal: Blood Pressure in Desired Range  Outcome: Progressing        Problem: Pain Acute  Goal: Optimal Pain Control and Function  Outcome: Progressing

## 2025-05-18 NOTE — PLAN OF CARE
Problem: Physical Therapy  Goal: Physical Therapy Goal  Description: Goals to be met by: discharge date     Patient will increase functional independence with mobility by performin. Supine to sit with Modified [place]  2. Sit to supine with Modified [place]  3. Rolling to Left and Right with Modified [place].  4. Sit to stand transfer with Modified [place]  5. Bed to chair transfer with Stand-by Assistance using Rolling Walker  6. Gait  x 150 feet with Stand-by Assistance using Rolling Walker.   7.  Able to don/doff LSO with mod independence    Outcome: Progressing

## 2025-05-18 NOTE — PLAN OF CARE
Assumed care of patient. Patient in no apparent distress on room air. Attempted to stand patient, max assist x 2 into a partial stand this shift. Patient had c/o pain throughout shift, PRN medication given around the clock. Medications administered via MAR, PRN medication given for pain. Bed alarm set, call light within reach, safety precautions maintained.    Problem: Adult Inpatient Plan of Care  Goal: Plan of Care Review  Outcome: Progressing  Goal: Patient-Specific Goal (Individualized)  Outcome: Progressing  Goal: Absence of Hospital-Acquired Illness or Injury  Outcome: Progressing  Goal: Optimal Comfort and Wellbeing  Outcome: Progressing  Goal: Readiness for Transition of Care  Outcome: Progressing     Problem: Diabetes Comorbidity  Goal: Blood Glucose Level Within Targeted Range  Outcome: Progressing     Problem: Wound  Goal: Optimal Coping  Outcome: Progressing  Goal: Optimal Functional Ability  Outcome: Progressing  Goal: Absence of Infection Signs and Symptoms  Outcome: Progressing  Goal: Improved Oral Intake  Outcome: Progressing  Goal: Optimal Pain Control and Function  Outcome: Progressing  Goal: Skin Health and Integrity  Outcome: Progressing  Goal: Optimal Wound Healing  Outcome: Progressing     Problem: Infection  Goal: Absence of Infection Signs and Symptoms  Outcome: Progressing     Problem: Spinal Surgery  Goal: Optimal Coping with Surgery  Outcome: Progressing  Goal: Absence of Bleeding  Outcome: Progressing  Goal: Effective Bowel Elimination  Outcome: Progressing  Goal: Fluid and Electrolyte Balance  Outcome: Progressing  Goal: Optimal Functional Ability  Outcome: Progressing  Goal: Absence of Infection Signs and Symptoms  Outcome: Progressing  Goal: Optimal Neurologic Function  Outcome: Progressing  Goal: Anesthesia/Sedation Recovery  Outcome: Progressing  Goal: Optimal Pain Control and Function  Outcome: Progressing  Goal: Nausea and Vomiting Relief  Outcome: Progressing  Goal: Effective  Urinary Elimination  Outcome: Progressing  Goal: Effective Oxygenation and Ventilation  Outcome: Progressing     Problem: Pain Acute  Goal: Optimal Pain Control and Function  Outcome: Progressing     Problem: Skin Injury Risk Increased  Goal: Skin Health and Integrity  Outcome: Progressing     Problem: Fall Injury Risk  Goal: Absence of Fall and Fall-Related Injury  Outcome: Progressing     Problem: Comorbidity Management  Goal: Maintenance of Heart Failure Symptom Control  Outcome: Progressing  Goal: Blood Pressure in Desired Range  Outcome: Progressing

## 2025-05-19 LAB
ABSOLUTE EOSINOPHIL (OHS): 0.56 K/UL
ABSOLUTE MONOCYTE (OHS): 1.07 K/UL (ref 0.3–1)
ABSOLUTE NEUTROPHIL COUNT (OHS): 6.11 K/UL (ref 1.8–7.7)
ANION GAP (OHS): 7 MMOL/L (ref 8–16)
BASOPHILS # BLD AUTO: 0.04 K/UL
BASOPHILS NFR BLD AUTO: 0.4 %
BUN SERPL-MCNC: 18 MG/DL (ref 8–23)
CALCIUM SERPL-MCNC: 9 MG/DL (ref 8.7–10.5)
CHLORIDE SERPL-SCNC: 100 MMOL/L (ref 95–110)
CO2 SERPL-SCNC: 29 MMOL/L (ref 23–29)
CREAT SERPL-MCNC: 1.1 MG/DL (ref 0.5–1.4)
ERYTHROCYTE [DISTWIDTH] IN BLOOD BY AUTOMATED COUNT: 13.6 % (ref 11.5–14.5)
GFR SERPLBLD CREATININE-BSD FMLA CKD-EPI: >60 ML/MIN/1.73/M2
GLUCOSE SERPL-MCNC: 181 MG/DL (ref 70–110)
HCT VFR BLD AUTO: 27.8 % (ref 40–54)
HGB BLD-MCNC: 9 GM/DL (ref 14–18)
IMM GRANULOCYTES # BLD AUTO: 0.05 K/UL (ref 0–0.04)
IMM GRANULOCYTES NFR BLD AUTO: 0.5 % (ref 0–0.5)
LYMPHOCYTES # BLD AUTO: 2.07 K/UL (ref 1–4.8)
MAGNESIUM SERPL-MCNC: 2.3 MG/DL (ref 1.6–2.6)
MCH RBC QN AUTO: 29.3 PG (ref 27–31)
MCHC RBC AUTO-ENTMCNC: 32.4 G/DL (ref 32–36)
MCV RBC AUTO: 91 FL (ref 82–98)
NUCLEATED RBC (/100WBC) (OHS): 0 /100 WBC
PLATELET # BLD AUTO: 167 K/UL (ref 150–450)
PMV BLD AUTO: 11.7 FL (ref 9.2–12.9)
POCT GLUCOSE: 198 MG/DL (ref 70–110)
POCT GLUCOSE: 200 MG/DL (ref 70–110)
POCT GLUCOSE: 209 MG/DL (ref 70–110)
POCT GLUCOSE: 242 MG/DL (ref 70–110)
POCT GLUCOSE: 86 MG/DL (ref 70–110)
POTASSIUM SERPL-SCNC: 4.4 MMOL/L (ref 3.5–5.1)
RBC # BLD AUTO: 3.07 M/UL (ref 4.6–6.2)
RELATIVE EOSINOPHIL (OHS): 5.7 %
RELATIVE LYMPHOCYTE (OHS): 20.9 % (ref 18–48)
RELATIVE MONOCYTE (OHS): 10.8 % (ref 4–15)
RELATIVE NEUTROPHIL (OHS): 61.7 % (ref 38–73)
SODIUM SERPL-SCNC: 136 MMOL/L (ref 136–145)
WBC # BLD AUTO: 9.9 K/UL (ref 3.9–12.7)

## 2025-05-19 PROCEDURE — 36415 COLL VENOUS BLD VENIPUNCTURE: CPT | Performed by: NURSE PRACTITIONER

## 2025-05-19 PROCEDURE — 97530 THERAPEUTIC ACTIVITIES: CPT | Mod: CO

## 2025-05-19 PROCEDURE — 11000001 HC ACUTE MED/SURG PRIVATE ROOM

## 2025-05-19 PROCEDURE — 94761 N-INVAS EAR/PLS OXIMETRY MLT: CPT

## 2025-05-19 PROCEDURE — 85025 COMPLETE CBC W/AUTO DIFF WBC: CPT | Performed by: NURSE PRACTITIONER

## 2025-05-19 PROCEDURE — 97530 THERAPEUTIC ACTIVITIES: CPT | Mod: CQ

## 2025-05-19 PROCEDURE — 99900035 HC TECH TIME PER 15 MIN (STAT)

## 2025-05-19 PROCEDURE — 80197 ASSAY OF TACROLIMUS: CPT | Performed by: NURSE PRACTITIONER

## 2025-05-19 PROCEDURE — 94799 UNLISTED PULMONARY SVC/PX: CPT | Mod: XB

## 2025-05-19 PROCEDURE — 63600175 PHARM REV CODE 636 W HCPCS: Performed by: NEUROLOGICAL SURGERY

## 2025-05-19 PROCEDURE — 83735 ASSAY OF MAGNESIUM: CPT | Performed by: NURSE PRACTITIONER

## 2025-05-19 PROCEDURE — 63600175 PHARM REV CODE 636 W HCPCS: Performed by: NURSE PRACTITIONER

## 2025-05-19 PROCEDURE — 63600175 PHARM REV CODE 636 W HCPCS: Performed by: STUDENT IN AN ORGANIZED HEALTH CARE EDUCATION/TRAINING PROGRAM

## 2025-05-19 PROCEDURE — 25000003 PHARM REV CODE 250: Performed by: NEUROLOGICAL SURGERY

## 2025-05-19 PROCEDURE — 25000003 PHARM REV CODE 250: Performed by: NURSE PRACTITIONER

## 2025-05-19 PROCEDURE — 82947 ASSAY GLUCOSE BLOOD QUANT: CPT | Performed by: NURSE PRACTITIONER

## 2025-05-19 RX ORDER — INSULIN GLARGINE 100 [IU]/ML
15 INJECTION, SOLUTION SUBCUTANEOUS DAILY
Status: DISCONTINUED | OUTPATIENT
Start: 2025-05-20 | End: 2025-05-22

## 2025-05-19 RX ORDER — DIAZEPAM 5 MG/1
5 TABLET ORAL EVERY 8 HOURS PRN
Status: DISCONTINUED | OUTPATIENT
Start: 2025-05-19 | End: 2025-05-23 | Stop reason: HOSPADM

## 2025-05-19 RX ORDER — GLUCAGON 1 MG
1 KIT INJECTION
Status: DISCONTINUED | OUTPATIENT
Start: 2025-05-19 | End: 2025-05-23 | Stop reason: HOSPADM

## 2025-05-19 RX ORDER — IBUPROFEN 200 MG
16 TABLET ORAL
Status: DISCONTINUED | OUTPATIENT
Start: 2025-05-19 | End: 2025-05-23 | Stop reason: HOSPADM

## 2025-05-19 RX ORDER — LOPERAMIDE HYDROCHLORIDE 2 MG/1
2 CAPSULE ORAL 4 TIMES DAILY PRN
Status: DISCONTINUED | OUTPATIENT
Start: 2025-05-19 | End: 2025-05-23 | Stop reason: HOSPADM

## 2025-05-19 RX ORDER — LORAZEPAM 0.5 MG/1
0.5 TABLET ORAL ONCE
Status: DISCONTINUED | OUTPATIENT
Start: 2025-05-19 | End: 2025-05-19

## 2025-05-19 RX ORDER — HYDRALAZINE HYDROCHLORIDE 20 MG/ML
10 INJECTION INTRAMUSCULAR; INTRAVENOUS EVERY 8 HOURS PRN
Status: DISCONTINUED | OUTPATIENT
Start: 2025-05-19 | End: 2025-05-23 | Stop reason: HOSPADM

## 2025-05-19 RX ORDER — IBUPROFEN 200 MG
24 TABLET ORAL
Status: DISCONTINUED | OUTPATIENT
Start: 2025-05-19 | End: 2025-05-23 | Stop reason: HOSPADM

## 2025-05-19 RX ORDER — INSULIN ASPART 100 [IU]/ML
0-5 INJECTION, SOLUTION INTRAVENOUS; SUBCUTANEOUS
Status: DISCONTINUED | OUTPATIENT
Start: 2025-05-19 | End: 2025-05-23 | Stop reason: HOSPADM

## 2025-05-19 RX ADMIN — ATORVASTATIN CALCIUM 40 MG: 40 TABLET, FILM COATED ORAL at 07:05

## 2025-05-19 RX ADMIN — TACROLIMUS 1 MG: 1 CAPSULE ORAL at 06:05

## 2025-05-19 RX ADMIN — HYDROMORPHONE HYDROCHLORIDE 2 MG: 2 INJECTION INTRAMUSCULAR; INTRAVENOUS; SUBCUTANEOUS at 06:05

## 2025-05-19 RX ADMIN — ACETAMINOPHEN 650 MG: 325 TABLET ORAL at 11:05

## 2025-05-19 RX ADMIN — HEPARIN SODIUM 5000 UNITS: 5000 INJECTION INTRAVENOUS; SUBCUTANEOUS at 08:05

## 2025-05-19 RX ADMIN — ALUMINUM HYDROXIDE, MAGNESIUM HYDROXIDE, AND DIMETHICONE 30 ML: 200; 20; 200 SUSPENSION ORAL at 11:05

## 2025-05-19 RX ADMIN — MUPIROCIN: 20 OINTMENT TOPICAL at 08:05

## 2025-05-19 RX ADMIN — INSULIN ASPART 1 UNITS: 100 INJECTION, SOLUTION INTRAVENOUS; SUBCUTANEOUS at 09:05

## 2025-05-19 RX ADMIN — ALLOPURINOL 100 MG: 100 TABLET ORAL at 07:05

## 2025-05-19 RX ADMIN — INSULIN GLARGINE 20 UNITS: 100 INJECTION, SOLUTION SUBCUTANEOUS at 08:05

## 2025-05-19 RX ADMIN — OXYCODONE 15 MG: 5 TABLET ORAL at 08:05

## 2025-05-19 RX ADMIN — ACETAMINOPHEN 650 MG: 325 TABLET ORAL at 06:05

## 2025-05-19 RX ADMIN — TACROLIMUS 2 MG: 1 CAPSULE ORAL at 08:05

## 2025-05-19 RX ADMIN — GABAPENTIN 900 MG: 300 CAPSULE ORAL at 08:05

## 2025-05-19 RX ADMIN — ALLOPURINOL 100 MG: 100 TABLET ORAL at 08:05

## 2025-05-19 RX ADMIN — HYDROMORPHONE HYDROCHLORIDE 2 MG: 2 INJECTION INTRAMUSCULAR; INTRAVENOUS; SUBCUTANEOUS at 11:05

## 2025-05-19 RX ADMIN — METHOCARBAMOL 750 MG: 750 TABLET ORAL at 08:05

## 2025-05-19 RX ADMIN — LISINOPRIL 20 MG: 20 TABLET ORAL at 07:05

## 2025-05-19 RX ADMIN — OXYCODONE 15 MG: 5 TABLET ORAL at 07:05

## 2025-05-19 RX ADMIN — TRAZODONE HYDROCHLORIDE 50 MG: 50 TABLET ORAL at 07:05

## 2025-05-19 RX ADMIN — OXYCODONE 15 MG: 5 TABLET ORAL at 04:05

## 2025-05-19 RX ADMIN — METHOCARBAMOL 750 MG: 750 TABLET ORAL at 07:05

## 2025-05-19 RX ADMIN — METOPROLOL SUCCINATE 200 MG: 50 TABLET, EXTENDED RELEASE ORAL at 07:05

## 2025-05-19 RX ADMIN — GABAPENTIN 900 MG: 300 CAPSULE ORAL at 07:05

## 2025-05-19 RX ADMIN — METHOCARBAMOL 750 MG: 750 TABLET ORAL at 04:05

## 2025-05-19 RX ADMIN — HYDROMORPHONE HYDROCHLORIDE 2 MG: 2 INJECTION INTRAMUSCULAR; INTRAVENOUS; SUBCUTANEOUS at 02:05

## 2025-05-19 RX ADMIN — HYDRALAZINE HYDROCHLORIDE 10 MG: 20 INJECTION INTRAMUSCULAR; INTRAVENOUS at 06:05

## 2025-05-19 RX ADMIN — GABAPENTIN 900 MG: 300 CAPSULE ORAL at 04:05

## 2025-05-19 RX ADMIN — ALUMINUM HYDROXIDE, MAGNESIUM HYDROXIDE, AND DIMETHICONE 30 ML: 200; 20; 200 SUSPENSION ORAL at 08:05

## 2025-05-19 RX ADMIN — LEVOTHYROXINE SODIUM 88 MCG: 88 TABLET ORAL at 07:05

## 2025-05-19 RX ADMIN — HYDROMORPHONE HYDROCHLORIDE 2 MG: 2 INJECTION INTRAMUSCULAR; INTRAVENOUS; SUBCUTANEOUS at 12:05

## 2025-05-19 RX ADMIN — ALUMINUM HYDROXIDE, MAGNESIUM HYDROXIDE, AND DIMETHICONE 30 ML: 200; 20; 200 SUSPENSION ORAL at 06:05

## 2025-05-19 NOTE — SUBJECTIVE & OBJECTIVE
Interval History:  Seen at the bedside.  No distress noted.  We will continue to follow along with primary team.  We made some adjustments to his insulin.  Stopped the aspart and will need to monitor his blood sugar.  I did decrease the Lantus to 15 units daily.  Patient noted with hypoglycemic episodes overnight.    Review of Systems   Constitutional:  Positive for activity change and fever.   HENT:  Negative for trouble swallowing.    Musculoskeletal:  Positive for arthralgias, back pain, gait problem, myalgias and neck pain.   Neurological:  Positive for numbness (Chronic).   Psychiatric/Behavioral:  Negative for confusion.      Objective:     Vital Signs (Most Recent):  Temp: 99 °F (37.2 °C) (05/19/25 0725)  Pulse: 92 (05/19/25 1100)  Resp: 14 (05/19/25 1116)  BP: 126/64 (05/19/25 1100)  SpO2: 97 % (05/19/25 0822) Vital Signs (24h Range):  Temp:  [98 °F (36.7 °C)-99 °F (37.2 °C)] 99 °F (37.2 °C)  Pulse:  [] 92  Resp:  [12-20] 14  SpO2:  [93 %-98 %] 97 %  BP: (115-168)/(61-98) 126/64     Weight: 116.1 kg (255 lb 15.3 oz)  Body mass index is 32.86 kg/m².    Intake/Output Summary (Last 24 hours) at 5/19/2025 1133  Last data filed at 5/19/2025 0635  Gross per 24 hour   Intake 360 ml   Output 1300 ml   Net -940 ml         Physical Exam  Vitals and nursing note reviewed.   Constitutional:       Appearance: He is ill-appearing.   HENT:      Mouth/Throat:      Mouth: Mucous membranes are moist.   Eyes:      Extraocular Movements: Extraocular movements intact.   Cardiovascular:      Rate and Rhythm: Normal rate and regular rhythm.   Pulmonary:      Effort: Pulmonary effort is normal.   Neurological:      Mental Status: He is alert.      Motor: Weakness present.               Significant Labs: All pertinent labs within the past 24 hours have been reviewed.    Significant Imaging: I have reviewed all pertinent imaging results/findings within the past 24 hours.

## 2025-05-19 NOTE — ASSESSMENT & PLAN NOTE
Creatine stable for now. BMP reviewed- noted Estimated Creatinine Clearance: 74.7 mL/min (based on SCr of 1.3 mg/dL). according to latest data. Based on current GFR, CKD stage is stage 3 - GFR 30-59.  Monitor UOP and serial BMP and adjust therapy as needed. Renally dose meds. Avoid nephrotoxic medications and procedures.

## 2025-05-19 NOTE — PLAN OF CARE
Problem: Occupational Therapy  Goal: Occupational Therapy Goal  Description: Goals to be met by: 6/16/25     Patient will increase functional independence with ADLs by performing:    LE Dressing with Stand-by Assistance using reacher.  Grooming while EOB with Set-up Assistance.  Toileting from toilet with Minimal Assistance for hygiene and clothing management.   Rolling to Bilateral with Stand-by Assistance.   Supine to sit with Minimal Assistance.  Stand pivot transfers with Minimal Assistance.  Toilet transfer to bedside commode with Minimal Assistance.    Outcome: Progressing   Vick Gonzalez is a 69 y.o. male with a medical diagnosis of Adjacent segment disease of lumbar spine with history of fusion procedure.  Performance deficits affecting function are weakness, impaired endurance, impaired self care skills, impaired functional mobility, gait instability, impaired balance, decreased upper extremity function, decreased lower extremity function, decreased safety awareness, pain, decreased ROM, impaired coordination, impaired skin, edema, orthopedic precautions.    Pt found in bed, agreeable to therapy.  Pt continues with significant pain and BLE weakness hindering progress with BADL tasks and functional mobility.  Rec High Intensity Therapy Post Acute Care at time of discharge to maximize [place] and safety with ADL's and functional mobility. Continue OT services to address functional goals, progressing as able.

## 2025-05-19 NOTE — PLAN OF CARE
Problem: Adult Inpatient Plan of Care  Goal: Optimal Comfort and Wellbeing  Outcome: Progressing     Problem: Diabetes Comorbidity  Goal: Blood Glucose Level Within Targeted Range  Outcome: Progressing     Problem: Wound  Goal: Optimal Pain Control and Function  Outcome: Progressing  Goal: Skin Health and Integrity  Outcome: Progressing  Goal: Optimal Wound Healing  Outcome: Progressing     Problem: Infection  Goal: Absence of Infection Signs and Symptoms  Outcome: Progressing     Problem: Spinal Surgery  Goal: Absence of Bleeding  Outcome: Progressing  Goal: Absence of Infection Signs and Symptoms  Outcome: Progressing  Goal: Optimal Pain Control and Function  Outcome: Progressing

## 2025-05-19 NOTE — PT/OT/SLP PROGRESS
Physical Therapy Treatment    Patient Name:  Vick Gonzalez   MRN:  [id number]    Recommendations:     Discharge Recommendations: High Intensity Therapy  Discharge Equipment Recommendations: walker, rolling, to be determined by next level of care  Barriers to discharge: decreased mobility,strength and endurance    Assessment:     Vick Gonzalez is a 69 y.o. male admitted with a medical diagnosis of Adjacent segment disease of lumbar spine with history of fusion procedure.  He presents with the following impairments/functional limitations: weakness, impaired endurance, impaired functional mobility, gait instability, impaired balance, decreased lower extremity function, pain, decreased ROM, impaired coordination, impaired fine motor, impaired skin, impaired joint extensibility, orthopedic precautions,pt with good participation and requires assistance with all mobility at this time,pt will benefit from high intensity therapy upon discharge.    Rehab Prognosis: Good; patient would benefit from acute skilled PT services to address these deficits and reach maximum level of function.    Recent Surgery: Procedure(s) (LRB):  ARTHRODESIS, SPINE, LUMBAR, OLIF, MINIMALLY INVASIVE (N/A)  FACETECTOMY (N/A) 4 Days Post-Op    Plan:     During this hospitalization, patient to be seen daily to address the identified rehab impairments via gait training, therapeutic activities, therapeutic exercises, neuromuscular re-education and progress toward the following goals:    Plan of Care Expires:  06/16/25    Subjective     Chief Complaint: n/a  Patient/Family Comments/goals: pt states his legs are weak.  Pain/Comfort:  Pain Rating 1:  (no rating)  Location - Orientation 1: lower  Location 1: back  Pain Addressed 1: Reposition, Distraction, Nurse notified      Objective:     Communicated with nsg prior to session.  Patient found supine with bed alarm, PureWick, BILL drain, peripheral IV upon PT entry to room.     General Precautions:  Standard, fall  Orthopedic Precautions: spinal precautions  Braces: LSO  Respiratory Status: Room air     Functional Mobility:  Bed Mobility:     Supine to Sit: maximal assistance and of 2 persons  Sit to Supine: maximal assistance and of 2 persons  Transfers:     Sit to Stand:  maximal assistance, of 2 persons, and X 5 trials and 3 were successful stands with rolling walker  Balance: fair sitting balance      AM-PAC 6 CLICK MOBILITY  Turning over in bed (including adjusting bedclothes, sheets and blankets)?: 3  Sitting down on and standing up from a chair with arms (e.g., wheelchair, bedside commode, etc.): 2  Moving from lying on back to sitting on the side of the bed?: 2  Moving to and from a bed to a chair (including a wheelchair)?: 2  Need to walk in hospital room?: 1  Climbing 3-5 steps with a railing?: 1  Basic Mobility Total Score: 11       Treatment & Education: scoot back in bed Max A,pt's B knees buckling with standing,L > R,pt with some agitation and arguementative.      Patient left supine with all lines intact, call button in reach, and bed alarm on..    GOALS: see general POC  Multidisciplinary Problems       Physical Therapy Goals          Problem: Physical Therapy    Goal Priority Disciplines Outcome Interventions   Physical Therapy Goal     PT, PT/OT Progressing    Description: Goals to be met by: discharge date     Patient will increase functional independence with mobility by performin. Supine to sit with Modified [place]  2. Sit to supine with Modified [place]  3. Rolling to Left and Right with Modified [place].  4. Sit to stand transfer with Modified [place]  5. Bed to chair transfer with Stand-by Assistance using Rolling Walker  6. Gait  x 150 feet with Stand-by Assistance using Rolling Walker.   7.  Able to don/doff LSO with mod independence                         DME Justifications:  No DME recommended requiring DME justifications    Time Tracking:     PT Received  On: 05/19/25  PT Start Time: 1013     PT Stop Time: 1055  PT Total Time (min): 42 min     Billable Minutes: Therapeutic Activity 42    Treatment Type: Treatment  PT/PTA: PTA     Number of PTA visits since last PT visit: 3     05/19/2025

## 2025-05-19 NOTE — ASSESSMENT & PLAN NOTE
Patient's blood pressure range in the last 24 hours was: BP  Min: 115/61  Max: 168/90.The patient's inpatient anti-hypertensive regimen is listed below:  Current Antihypertensives  metoprolol succinate (TOPROL-XL) 24 hr tablet 200 mg, Daily, Oral  lisinopriL tablet 20 mg, Daily, Oral    Plan  - BP is controlled, no changes needed to their regimen

## 2025-05-19 NOTE — PLAN OF CARE
Rounded at bedside. PT is recommending HIT. Pt is agreeable to going to rehab. Referral sent to [facility] for acceptance. Request for Auth sent to Stephanie at [facility] via fax and email. CMwill continue to follow pt and provide any dc needs.     Future Appointments   Date Time Provider Department Center   5/22/2025 11:45 AM [facility] OIC-US1 MASTER [facility] ULTR [facility]   5/30/2025  1:00 PM [facility] ODC XR-B LIMIT 500 LBS [facility] XRAY OP [facility]   5/30/2025  2:00 PM Paige Gilbert PA-C [facility] NEUROSU [facility]   6/17/2025  8:30 AM LAB, [facility] LAB [place]   6/23/2025  4:00 PM Emanuel Lopez MD [facility] FAM MED [facility]   6/30/2025  9:00 AM LAB, [facility] LAB [place]   7/1/2025  9:45 AM [facility] OIC-XRAY [facility] XRAY [facility]   7/1/2025 10:30 AM Oralia Rowland, NP [facility] NEUROS8 [facility]   7/24/2025  3:20 PM Paddy Segundo, OD [facility] OPTOMTY [facility]   8/19/2025  9:00 AM [facility] ORTHO CLINIC LIMIT 350LBS [facility] ORTHXR [facility] [facility]   8/19/2025 10:00 AM Khadar Payne MD [facility] NEUROSU [facility]      05/19/25 1248   Rounds   Attendance Nurse    Discharge Plan A Rehab   Why the patient remains in the hospital Requires continued medical care   Transition of Care Barriers None

## 2025-05-19 NOTE — PROGRESS NOTES
[facility]  Neurosurgery  Progress Note    Subjective:     Interval History: Complains of back pain and right leg weakness.  Stood up with PT and did side steps over the weekend.  States pain not controlled.    History of Present Illness:     He is complaining of worsening low back pain, radiating down the bilateral buttock and posterolateral thigh.  He has difficulty standing upright and has to lean forward when he walks.  He is walking using a cane.  Pain is affecting his ability to stand, walk.  He is unable to do much physical activity at this time due to the severe pain.  He takes gabapentin 800 mg 3 times daily.  He takes Percocet 10 mg 3 times daily.  He is a nonsmoker.  He is not drinking at this time.     He is using his cervical spinal cord stimulator.  His neck pain has significantly improved following the spinal cord stimulator placement.  He does not complain of severe left arm pain anymore.  He has gait imbalance that has remained stable.    Post-Op Info:  Procedure(s) (LRB):  ARTHRODESIS, SPINE, LUMBAR, OLIF, MINIMALLY INVASIVE (N/A)  FACETECTOMY (N/A)   4 Days Post-Op      Medications:  Continuous Infusions:      Scheduled Meds:   acetaminophen  650 mg Oral Q6H    allopurinoL  100 mg Oral BID    aluminum-magnesium hydroxide-simethicone  30 mL Oral QID (AC & HS)    atorvastatin  40 mg Oral Daily    bisacodyL  10 mg Rectal Daily    gabapentin  900 mg Oral TID    heparin (porcine)  5,000 Units Subcutaneous Q12H    [START ON 5/20/2025] insulin glargine U-100 (Lantus)  15 Units Subcutaneous Daily    levothyroxine  88 mcg Oral Daily    lisinopriL  20 mg Oral Daily    methocarbamoL  750 mg Oral TID    metoprolol succinate  200 mg Oral Daily    mupirocin   Nasal BID    polyethylene glycol  17 g Oral Daily    tacrolimus  2 mg Oral Daily AM    And    tacrolimus  1 mg Oral Daily PM    traZODone  50 mg Oral QHS     PRN Meds:  Current Facility-Administered Medications:     aluminum-magnesium  "hydroxide-simethicone, 30 mL, Oral, Q4H PRN    HYDROmorphone, 2 mg, Intravenous, Q3H PRN    ondansetron, 8 mg, Oral, Q6H PRN    oxyCODONE, 15 mg, Oral, Q4H PRN    prochlorperazine, 5 mg, Intravenous, Q6H PRN    senna-docusate, 2 tablet, Oral, Nightly PRN     Review of Systems  Objective:     Weight: 116.1 kg (255 lb 15.3 oz)  Body mass index is 32.86 kg/m².  Vital Signs (Most Recent):  Temp: 99 °F (37.2 °C) (05/19/25 0725)  Pulse: 92 (05/19/25 1100)  Resp: 14 (05/19/25 1116)  BP: 126/64 (05/19/25 1100)  SpO2: 97 % (05/19/25 0822) Vital Signs (24h Range):  Temp:  [98 °F (36.7 °C)-99 °F (37.2 °C)] 99 °F (37.2 °C)  Pulse:  [] 92  Resp:  [12-20] 14  SpO2:  [93 %-98 %] 97 %  BP: (115-168)/(61-98) 126/64                                Closed/Suction Drain 05/15/25 1420 Tube - 1 Left Back Bulb 10 Fr. (Active)   Site Description Bleeding 05/15/25 1625   Dressing Type Other (Comment) 05/15/25 1625   Dressing Status New drainage 05/15/25 1625   Dressing Intervention First dressing 05/15/25 1625   Drainage Serosanguineous 05/15/25 1625   Status To bulb suction 05/15/25 1625   Output (mL) 40 mL 05/16/25 0710            Urethral Catheter 05/15/25 0945 Non-latex;Straight-tip 16 Fr. (Active)   Site Assessment Intact;Clean;Dry 05/15/25 2000   Collection Container Urimeter 05/15/25 2000   Securement Method secured to top of thigh w/ adhesive device 05/15/25 1625   Reason for Continuing Urinary Catheterization Post operative 05/15/25 1625   CAUTI Prevention Bundle Securement Device in place with 1" slack;Intact seal between catheter & drainage tubing;Drainage bag/urimeter off the floor;Sheeting clip in use;No dependent loops or kinks;Drainage bag/urimeter below bladder;Drainage bag/urimeter not overfilled (<2/3 full) 05/15/25 1625   Output (mL) 450 mL 05/16/25 0424       Neurosurgery Physical Exam    General: well developed, well nourished, no distress  Mental Status: Awake, Alert, Oriented X3.Thought content appropriate  GCS: " "Motor: 6/Verbal: 5/Eyes: 4 GCS Total: 15    Motor Strength:  Strength                                HF KF KE DF PF EHL   Lower: R 4/5 4/5 4/5 5/5 5/5 5/5    L 5/5 5/5 5/5 5/5 5/5 5/5       Incision- CDI  Drain- 10 05/17  No documentation 05/18      Significant Labs:  Recent Labs   Lab 05/18/25  0938   *      K 4.2      CO2 27   BUN 19   CREATININE 1.3   CALCIUM 8.8     Recent Labs   Lab 05/18/25  0938   WBC 11.11   HGB 8.9*   HCT 27.4*   *     No results for input(s): "LABPT", "INR", "APTT" in the last 48 hours.  Microbiology Results (last 7 days)       ** No results found for the last 168 hours. **              Assessment/Plan:     Active Diagnoses:    Diagnosis Date Noted POA    PRINCIPAL PROBLEM:  Adjacent segment disease of lumbar spine with history of fusion procedure [M51.369, Z98.1] 04/08/2015 Not Applicable    Spinal stenosis of lumbar region with neurogenic claudication [M48.062] 05/15/2025 Yes    Type 2 diabetes mellitus, with long-term current use of insulin [E11.9, Z79.4] 05/15/2025 Not Applicable    CKD (chronic kidney disease), stage III [N18.30]  Yes     Chronic    Hypertriglyceridemia [E78.1] 10/05/2016 Yes    Type 2 diabetes mellitus with diabetic polyneuropathy, with long-term current use of insulin [E11.42, Z79.4] 10/04/2016 Not Applicable     Chronic    S/P liver transplant [Z94.4] 10/04/2016 Not Applicable     Chronic    Essential hypertension [I10] 06/19/2015 Yes    Acquired hypothyroidism [E03.9]  Yes     Chronic    Chronic pain syndrome [G89.4] 07/13/2011 Yes    Gout, unspecified [M10.9] 03/14/2011 Yes      Problems Resolved During this Admission:       A/P:  POD #4 left L4-5 OLIF and posterior instrumentation     --Neurologically stable          -q4h neuro checks  --Imaging: Post op xrays pending  --Drains: Removed  --Pain control  --DVT ppx: TEDs/SCDs/SQH  --Activity: PT/OT, OOB. LSO brace  --Diet: Diabetic, Saline Lock IV  --Bowel regimen: PRN suppository, senna " PRN  --Urinary: voiding  --Atelectasis ppx: Encourage IS hourly  --Appreciate HM recs     Dispo: Pending rehab/snf and imaging    All of the above discussed and reviewed with Dr. Payne.      Paige Gilbert PA-C  Neurosurgery  [facility]

## 2025-05-19 NOTE — PT/OT/SLP PROGRESS
"Occupational Therapy   Treatment    Name: Vick Gonzalez  MRN: [id number]  Admitting Diagnosis:  Adjacent segment disease of lumbar spine with history of fusion procedure  4 Days Post-Op    Recommendations:     Discharge Recommendations: High Intensity Therapy  Discharge Equipment Recommendations:  to be determined by next level of care, walker, rolling  Barriers to discharge:  Inaccessible home environment, Decreased caregiver support, Other (Comment) (Increased level of assistance)    Assessment:     Vick Gonzalez is a 69 y.o. male with a medical diagnosis of Adjacent segment disease of lumbar spine with history of fusion procedure.  Performance deficits affecting function are weakness, impaired endurance, impaired self care skills, impaired functional mobility, gait instability, impaired balance, decreased upper extremity function, decreased lower extremity function, decreased safety awareness, pain, decreased ROM, impaired coordination, impaired skin, edema, orthopedic precautions.    Pt found in bed, agreeable to therapy.  Pt continues with significant pain and BLE weakness hindering progress with BADL tasks and functional mobility.  Rec High Intensity Therapy Post Acute Care at time of discharge to maximize [place] and safety with ADL's and functional mobility. Continue OT services to address functional goals, progressing as able.      Rehab Prognosis:  Good; patient would benefit from acute skilled OT services to address these deficits and reach maximum level of function.       Plan:     Patient to be seen 5 x/week to address the above listed problems via self-care/home management, therapeutic activities, therapeutic exercises  Plan of Care Expires: 06/16/25  Plan of Care Reviewed with: patient    Subjective     Chief Complaint: pain and BLE weakness L>R  Patient/Family Comments/goals: to be more independent   Pain/Comfort:  Pain Rating 1:  (pt would not state pain level/location stating "you should " "know where my pain is!")    Objective:     Communicated with: nurse prior to session.  Patient found HOB elevated with bed alarm, peripheral IV, PureWick upon OT entry to room.    General Precautions: Standard, fall    Orthopedic Precautions:spinal precautions  Braces: LSO  Respiratory Status: Room air     Occupational Performance:     Bed Mobility:    Patient completed Rolling/Turning to Left with stand by assistance and with side rail  Patient completed Rolling/Turning to Right with stand by assistance and with side rail  Patient completed Scooting/Bridging with dependent and 2 persons, supine to HOB, bed in trendelenburg   Patient completed Supine to Sit with maximal assistance and 2 persons, HOB elevated, use of bed rail, increased time and effort, vc's for effective log roll technique  Patient completed Sit to Supine with maximal assistance and 2 persons using log roll technique    Functional Mobility/Transfers:  Patient completed Sit <> Stand Transfer with maximal assistance and of 2 persons  with  rolling walker , bed height elevated, x 3/5 successful trials.  Pt requires vc's for effective tech (scoot to edge of bed, BLE position and hand placement when sitting<>standing).  Functional Mobility: Pt ambulated 1 step for/back with Max A x 2 (2nd standing trial) then laterally with Max A x 2 (3rd standing trial) using RW. Pt with B knee buckling, L>R. Pt unsafe when sitting as he does not lean forward to sit and descends straight down to edge of bed increasing risk of falling.  Pt stands statically with Min A x 2 for safety using RW.     Activities of Daily Living:  Toileting: dependence Pt found on bedpan       [facility] 6 Click ADL: 17    Treatment & Education:  Pt with displaced agitation/frustration and argumentative at times.  Pt would not state spinal precautions or pain level/location although he does endorse pain.   He dons LSO with Min A seated EOB.   Pt sat EOB with SBA.   Pt requires Max A x 2 and vcs for " effect tech to scoot seated back on to bed.       Patient left HOB elevated with all lines intact, call button in reach, bed alarm on, and nurse notified    GOALS:   Multidisciplinary Problems       Occupational Therapy Goals          Problem: Occupational Therapy    Goal Priority Disciplines Outcome Interventions   Occupational Therapy Goal     OT, PT/OT Progressing    Description: Goals to be met by: 6/16/25     Patient will increase functional independence with ADLs by performing:    LE Dressing with Stand-by Assistance using reacher.  Grooming while EOB with Set-up Assistance.  Toileting from toilet with Minimal Assistance for hygiene and clothing management.   Rolling to Bilateral with Stand-by Assistance.   Supine to sit with Minimal Assistance.  Stand pivot transfers with Minimal Assistance.  Toilet transfer to bedside commode with Minimal Assistance.                         Time Tracking:     OT Date of Treatment: 05/19/25  OT Start Time: 1013  OT Stop Time: 1055  OT Total Time (min): 42 min    Billable Minutes:Therapeutic Activity 42          5/19/2025

## 2025-05-19 NOTE — PLAN OF CARE
Problem: Physical Therapy  Goal: Physical Therapy Goal  Description: Goals to be met by: discharge date     Patient will increase functional independence with mobility by performin. Supine to sit with Modified [place]  2. Sit to supine with Modified [place]  3. Rolling to Left and Right with Modified [place].  4. Sit to stand transfer with Modified [place]  5. Bed to chair transfer with Stand-by Assistance using Rolling Walker  6. Gait  x 150 feet with Stand-by Assistance using Rolling Walker.   7.  Able to don/doff LSO with mod independence    Outcome: Progressing

## 2025-05-19 NOTE — PROGRESS NOTES
[facility] Medicine  Progress Note    Patient Name: Vick Gonzalez  MRN: [id number]  Patient Class: IP- Inpatient   Admission Date: 5/15/2025  Length of Stay: 4 days  Attending Physician: Khadar Payne MD  Primary Care Provider: Emanuel Lopez MD        Subjective     Principal Problem:Adjacent segment disease of lumbar spine with history of fusion procedure        HPI:  Vick Gonzalez is a chronically ill 70-year-old male admitted to [facility] today for adjacent segment disease of the lumbar spine.  The patient has a long complex history of chronic lower back pain and has developed neurogenic claudication.  He is followed in outpatient setting by Dr. Khadar Payne.  On today, the patient underwent Left L4-5 oblique interbody fusion, placement of interbody spacer, DePuy Conduit LLIF spacer filled with allograft BMP and DBM, L4-S1 posterior segmental instrumentation using DePuy Viper Prime system, and  L4-5 laminectomy, medial facetectomy, foraminotomy.  Due to the severity of the patient's underlying comorbidities, Hospital Medicine was consulted for medical management.    Overview/Hospital Course:  No notes on file    Interval History:  Seen at the bedside.  No distress noted.  We will continue to follow along with primary team.  We made some adjustments to his insulin.  Stopped the aspart and will need to monitor his blood sugar.  I did decrease the Lantus to 15 units daily.  Patient noted with hypoglycemic episodes overnight.    Review of Systems   Constitutional:  Positive for activity change and fever.   HENT:  Negative for trouble swallowing.    Musculoskeletal:  Positive for arthralgias, back pain, gait problem, myalgias and neck pain.   Neurological:  Positive for numbness (Chronic).   Psychiatric/Behavioral:  Negative for confusion.      Objective:     Vital Signs (Most Recent):  Temp: 99 °F (37.2 °C) (05/19/25 0725)  Pulse: 92 (05/19/25 1100)  Resp: 14 (05/19/25 1116)  BP:  126/64 (05/19/25 1100)  SpO2: 97 % (05/19/25 0822) Vital Signs (24h Range):  Temp:  [98 °F (36.7 °C)-99 °F (37.2 °C)] 99 °F (37.2 °C)  Pulse:  [] 92  Resp:  [12-20] 14  SpO2:  [93 %-98 %] 97 %  BP: (115-168)/(61-98) 126/64     Weight: 116.1 kg (255 lb 15.3 oz)  Body mass index is 32.86 kg/m².    Intake/Output Summary (Last 24 hours) at 5/19/2025 1133  Last data filed at 5/19/2025 0635  Gross per 24 hour   Intake 360 ml   Output 1300 ml   Net -940 ml         Physical Exam  Vitals and nursing note reviewed.   Constitutional:       Appearance: He is ill-appearing.   HENT:      Mouth/Throat:      Mouth: Mucous membranes are moist.   Eyes:      Extraocular Movements: Extraocular movements intact.   Cardiovascular:      Rate and Rhythm: Normal rate and regular rhythm.   Pulmonary:      Effort: Pulmonary effort is normal.   Neurological:      Mental Status: He is alert.      Motor: Weakness present.               Significant Labs: All pertinent labs within the past 24 hours have been reviewed.    Significant Imaging: I have reviewed all pertinent imaging results/findings within the past 24 hours.      Assessment & Plan  Adjacent segment disease of lumbar spine with history of fusion procedure  -Defer to neurosurgery to manage    Chronic pain syndrome  -Analgesic therapy as needed    Acquired hypothyroidism  -resume levothyroxine 88 mcg daily    Gout, unspecified  -Resume allopurinol    Essential hypertension  Patient's blood pressure range in the last 24 hours was: BP  Min: 115/61  Max: 168/90.The patient's inpatient anti-hypertensive regimen is listed below:  Current Antihypertensives  metoprolol succinate (TOPROL-XL) 24 hr tablet 200 mg, Daily, Oral  lisinopriL tablet 20 mg, Daily, Oral    Plan  - BP is controlled, no changes needed to their regimen  Type 2 diabetes mellitus with diabetic polyneuropathy, with long-term current use of insulin  -Resume home insulin  -Accu-Cheks AC and HS    S/P liver  transplant  -Resume Prograf    Hypertriglyceridemia  -Resume home medications    CKD (chronic kidney disease), stage III  Creatine stable for now. BMP reviewed- noted Estimated Creatinine Clearance: 74.7 mL/min (based on SCr of 1.3 mg/dL). according to latest data. Based on current GFR, CKD stage is stage 3 - GFR 30-59.  Monitor UOP and serial BMP and adjust therapy as needed. Renally dose meds. Avoid nephrotoxic medications and procedures.  Spinal stenosis of lumbar region with neurogenic claudication  Per primary team    Type 2 diabetes mellitus, with long-term current use of insulin  Holding home meds  We will order insulin siding scale here in the hospital      VTE Risk Mitigation (From admission, onward)           Ordered     heparin (porcine) injection 5,000 Units  Every 12 hours         05/15/25 1625     IP VTE HIGH RISK PATIENT  Once         05/15/25 1625     Place sequential compression device  Until discontinued         05/15/25 1625                    Discharge Planning   JOJO: 5/20/2025     Code Status: Full Code   Medical Readiness for Discharge Date:   Discharge Plan A: Home, Home with family (Outpt therapy)                Please place Justification for DME        Sue Suarez NP  Department of Hospital Medicine   [facility]

## 2025-05-20 LAB
POCT GLUCOSE: 173 MG/DL (ref 70–110)
POCT GLUCOSE: 188 MG/DL (ref 70–110)
POCT GLUCOSE: 198 MG/DL (ref 70–110)
POCT GLUCOSE: 217 MG/DL (ref 70–110)
POCT GLUCOSE: 266 MG/DL (ref 70–110)
TACROLIMUS BLD-MCNC: 7.2 NG/ML (ref 5–15)

## 2025-05-20 PROCEDURE — 63600175 PHARM REV CODE 636 W HCPCS: Performed by: NEUROLOGICAL SURGERY

## 2025-05-20 PROCEDURE — 94799 UNLISTED PULMONARY SVC/PX: CPT | Mod: XB

## 2025-05-20 PROCEDURE — 25000003 PHARM REV CODE 250: Performed by: NURSE PRACTITIONER

## 2025-05-20 PROCEDURE — 97535 SELF CARE MNGMENT TRAINING: CPT

## 2025-05-20 PROCEDURE — 97530 THERAPEUTIC ACTIVITIES: CPT

## 2025-05-20 PROCEDURE — 25000003 PHARM REV CODE 250: Performed by: NEUROLOGICAL SURGERY

## 2025-05-20 PROCEDURE — 63600175 PHARM REV CODE 636 W HCPCS: Performed by: NURSE PRACTITIONER

## 2025-05-20 PROCEDURE — 11000001 HC ACUTE MED/SURG PRIVATE ROOM

## 2025-05-20 PROCEDURE — 99900035 HC TECH TIME PER 15 MIN (STAT)

## 2025-05-20 PROCEDURE — 94761 N-INVAS EAR/PLS OXIMETRY MLT: CPT

## 2025-05-20 RX ORDER — KETOROLAC TROMETHAMINE 30 MG/ML
15 INJECTION, SOLUTION INTRAMUSCULAR; INTRAVENOUS EVERY 6 HOURS
Status: COMPLETED | OUTPATIENT
Start: 2025-05-20 | End: 2025-05-21

## 2025-05-20 RX ADMIN — TACROLIMUS 2 MG: 1 CAPSULE ORAL at 08:05

## 2025-05-20 RX ADMIN — ATORVASTATIN CALCIUM 40 MG: 40 TABLET, FILM COATED ORAL at 08:05

## 2025-05-20 RX ADMIN — LEVOTHYROXINE SODIUM 88 MCG: 88 TABLET ORAL at 08:05

## 2025-05-20 RX ADMIN — TRAZODONE HYDROCHLORIDE 50 MG: 50 TABLET ORAL at 09:05

## 2025-05-20 RX ADMIN — HYDROMORPHONE HYDROCHLORIDE 2 MG: 2 INJECTION INTRAMUSCULAR; INTRAVENOUS; SUBCUTANEOUS at 12:05

## 2025-05-20 RX ADMIN — HYDRALAZINE HYDROCHLORIDE 10 MG: 20 INJECTION INTRAMUSCULAR; INTRAVENOUS at 04:05

## 2025-05-20 RX ADMIN — ALLOPURINOL 100 MG: 100 TABLET ORAL at 08:05

## 2025-05-20 RX ADMIN — INSULIN ASPART 3 UNITS: 100 INJECTION, SOLUTION INTRAVENOUS; SUBCUTANEOUS at 04:05

## 2025-05-20 RX ADMIN — HEPARIN SODIUM 5000 UNITS: 5000 INJECTION INTRAVENOUS; SUBCUTANEOUS at 09:05

## 2025-05-20 RX ADMIN — ACETAMINOPHEN 650 MG: 325 TABLET ORAL at 05:05

## 2025-05-20 RX ADMIN — INSULIN GLARGINE 15 UNITS: 100 INJECTION, SOLUTION SUBCUTANEOUS at 08:05

## 2025-05-20 RX ADMIN — ACETAMINOPHEN 650 MG: 325 TABLET ORAL at 11:05

## 2025-05-20 RX ADMIN — HYDROMORPHONE HYDROCHLORIDE 2 MG: 2 INJECTION INTRAMUSCULAR; INTRAVENOUS; SUBCUTANEOUS at 09:05

## 2025-05-20 RX ADMIN — KETOROLAC TROMETHAMINE 15 MG: 30 INJECTION, SOLUTION INTRAMUSCULAR; INTRAVENOUS at 05:05

## 2025-05-20 RX ADMIN — INSULIN ASPART 2 UNITS: 100 INJECTION, SOLUTION INTRAVENOUS; SUBCUTANEOUS at 06:05

## 2025-05-20 RX ADMIN — HYDROMORPHONE HYDROCHLORIDE 2 MG: 2 INJECTION INTRAMUSCULAR; INTRAVENOUS; SUBCUTANEOUS at 05:05

## 2025-05-20 RX ADMIN — METHOCARBAMOL 750 MG: 750 TABLET ORAL at 08:05

## 2025-05-20 RX ADMIN — METHOCARBAMOL 750 MG: 750 TABLET ORAL at 09:05

## 2025-05-20 RX ADMIN — HYDROMORPHONE HYDROCHLORIDE 2 MG: 2 INJECTION INTRAMUSCULAR; INTRAVENOUS; SUBCUTANEOUS at 04:05

## 2025-05-20 RX ADMIN — HYDROMORPHONE HYDROCHLORIDE 2 MG: 2 INJECTION INTRAMUSCULAR; INTRAVENOUS; SUBCUTANEOUS at 08:05

## 2025-05-20 RX ADMIN — HEPARIN SODIUM 5000 UNITS: 5000 INJECTION INTRAVENOUS; SUBCUTANEOUS at 08:05

## 2025-05-20 RX ADMIN — GABAPENTIN 900 MG: 300 CAPSULE ORAL at 02:05

## 2025-05-20 RX ADMIN — MUPIROCIN: 20 OINTMENT TOPICAL at 08:05

## 2025-05-20 RX ADMIN — ALLOPURINOL 100 MG: 100 TABLET ORAL at 09:05

## 2025-05-20 RX ADMIN — GABAPENTIN 900 MG: 300 CAPSULE ORAL at 09:05

## 2025-05-20 RX ADMIN — GABAPENTIN 900 MG: 300 CAPSULE ORAL at 08:05

## 2025-05-20 RX ADMIN — METHOCARBAMOL 750 MG: 750 TABLET ORAL at 02:05

## 2025-05-20 RX ADMIN — METOPROLOL SUCCINATE 200 MG: 50 TABLET, EXTENDED RELEASE ORAL at 08:05

## 2025-05-20 RX ADMIN — LISINOPRIL 20 MG: 20 TABLET ORAL at 08:05

## 2025-05-20 RX ADMIN — OXYCODONE 15 MG: 5 TABLET ORAL at 02:05

## 2025-05-20 RX ADMIN — OXYCODONE 15 MG: 5 TABLET ORAL at 06:05

## 2025-05-20 RX ADMIN — KETOROLAC TROMETHAMINE 15 MG: 30 INJECTION, SOLUTION INTRAMUSCULAR; INTRAVENOUS at 11:05

## 2025-05-20 RX ADMIN — TACROLIMUS 1 MG: 1 CAPSULE ORAL at 05:05

## 2025-05-20 NOTE — ASSESSMENT & PLAN NOTE
Patient's blood pressure range in the last 24 hours was: BP  Min: 115/85  Max: 205/87.The patient's inpatient anti-hypertensive regimen is listed below:  Current Antihypertensives  metoprolol succinate (TOPROL-XL) 24 hr tablet 200 mg, Daily, Oral  lisinopriL tablet 20 mg, Daily, Oral  hydrALAZINE injection 10 mg, Every 8 hours PRN, Intravenous    Plan  - BP is controlled, no changes needed to their regimen

## 2025-05-20 NOTE — PROGRESS NOTES
[facility]  Neurosurgery  Progress Note    Subjective:     Interval History: Back pain.  BL leg weakness.  Stood with PT and did side steps.  Hasn't walked or gotten to chair.  Eating and drinking some.    History of Present Illness:     He is complaining of worsening low back pain, radiating down the bilateral buttock and posterolateral thigh.  He has difficulty standing upright and has to lean forward when he walks.  He is walking using a cane.  Pain is affecting his ability to stand, walk.  He is unable to do much physical activity at this time due to the severe pain.  He takes gabapentin 800 mg 3 times daily.  He takes Percocet 10 mg 3 times daily.  He is a nonsmoker.  He is not drinking at this time.     He is using his cervical spinal cord stimulator.  His neck pain has significantly improved following the spinal cord stimulator placement.  He does not complain of severe left arm pain anymore.  He has gait imbalance that has remained stable.    Post-Op Info:  Procedure(s) (LRB):  ARTHRODESIS, SPINE, LUMBAR, OLIF, MINIMALLY INVASIVE (N/A)  FACETECTOMY (N/A)   5 Days Post-Op      Medications:  Continuous Infusions:      Scheduled Meds:   acetaminophen  650 mg Oral Q6H    allopurinoL  100 mg Oral BID    aluminum-magnesium hydroxide-simethicone  30 mL Oral QID (AC & HS)    atorvastatin  40 mg Oral Daily    bisacodyL  10 mg Rectal Daily    gabapentin  900 mg Oral TID    heparin (porcine)  5,000 Units Subcutaneous Q12H    insulin glargine U-100 (Lantus)  15 Units Subcutaneous Daily    ketorolac  15 mg Intravenous Q6H    levothyroxine  88 mcg Oral Daily    lisinopriL  20 mg Oral Daily    methocarbamoL  750 mg Oral TID    metoprolol succinate  200 mg Oral Daily    polyethylene glycol  17 g Oral Daily    tacrolimus  2 mg Oral Daily AM    And    tacrolimus  1 mg Oral Daily PM    traZODone  50 mg Oral QHS     PRN Meds:  Current Facility-Administered Medications:     aluminum-magnesium hydroxide-simethicone, 30 mL,  Oral, Q4H PRN    dextrose 50%, 12.5 g, Intravenous, PRN    dextrose 50%, 25 g, Intravenous, PRN    diazePAM, 5 mg, Oral, Q8H PRN    glucagon (human recombinant), 1 mg, Intramuscular, PRN    glucose, 16 g, Oral, PRN    glucose, 24 g, Oral, PRN    hydrALAZINE, 10 mg, Intravenous, Q8H PRN    HYDROmorphone, 2 mg, Intravenous, Q3H PRN    insulin aspart U-100, 0-5 Units, Subcutaneous, QID (AC + HS) PRN    loperamide, 2 mg, Oral, QID PRN    ondansetron, 8 mg, Oral, Q6H PRN    oxyCODONE, 15 mg, Oral, Q4H PRN    prochlorperazine, 5 mg, Intravenous, Q6H PRN    senna-docusate, 2 tablet, Oral, Nightly PRN     Review of Systems  Objective:     Weight: 116.1 kg (255 lb 15.3 oz)  Body mass index is 32.86 kg/m².  Vital Signs (Most Recent):  Temp: 98.2 °F (36.8 °C) (05/20/25 1137)  Pulse: 67 (05/20/25 1137)  Resp: 12 (05/20/25 1202)  BP: (!) 170/88 (05/20/25 1112)  SpO2: 95 % (05/20/25 1137) Vital Signs (24h Range):  Temp:  [97.7 °F (36.5 °C)-99.3 °F (37.4 °C)] 98.2 °F (36.8 °C)  Pulse:  [] 67  Resp:  [12-20] 12  SpO2:  [95 %-100 %] 95 %  BP: (165-205)/() 170/88     Date 05/20/25 0700 - 05/21/25 0659   Shift 1046-7423 0389-4497 8589-4895 24 Hour Total   INTAKE   Shift Total(mL/kg)       OUTPUT   Urine(mL/kg/hr) 590   590   Shift Total(mL/kg) 590(5.1)   590(5.1)   Weight (kg) 116.1 116.1 116.1 116.1                              Closed/Suction Drain 05/15/25 1420 Tube - 1 Left Back Bulb 10 Fr. (Active)   Site Description Bleeding 05/15/25 1625   Dressing Type Other (Comment) 05/15/25 1625   Dressing Status New drainage 05/15/25 1625   Dressing Intervention First dressing 05/15/25 1625   Drainage Serosanguineous 05/15/25 1625   Status To bulb suction 05/15/25 1625   Output (mL) 40 mL 05/16/25 0710            Urethral Catheter 05/15/25 0945 Non-latex;Straight-tip 16 Fr. (Active)   Site Assessment Intact;Clean;Dry 05/15/25 2000   Collection Container Urimeter 05/15/25 2000   Securement Method secured to top of thigh w/  "adhesive device 05/15/25 1625   Reason for Continuing Urinary Catheterization Post operative 05/15/25 1625   CAUTI Prevention Bundle Securement Device in place with 1" slack;Intact seal between catheter & drainage tubing;Drainage bag/urimeter off the floor;Sheeting clip in use;No dependent loops or kinks;Drainage bag/urimeter below bladder;Drainage bag/urimeter not overfilled (<2/3 full) 05/15/25 1625   Output (mL) 450 mL 05/16/25 0424       Neurosurgery Physical Exam    General: well developed, well nourished, no distress  Mental Status: Awake, Alert, Oriented X3.Thought content appropriate  GCS: Motor: 6/Verbal: 5/Eyes: 4 GCS Total: 15    Motor Strength:  Strength                                HF KF KE DF PF EHL   Lower: R 5/5 5/5 5/5 5/5 5/5 5/5    L 5/5 5/5 5/5 5/5 5/5 5/5       Incision- CDI        Significant Labs:  Recent Labs   Lab 05/19/25  1150   *      K 4.4      CO2 29   BUN 18   CREATININE 1.1   CALCIUM 9.0   MG 2.3     Recent Labs   Lab 05/19/25  1150   WBC 9.90   HGB 9.0*   HCT 27.8*        No results for input(s): "LABPT", "INR", "APTT" in the last 48 hours.  Microbiology Results (last 7 days)       ** No results found for the last 168 hours. **              Assessment/Plan:     Active Diagnoses:    Diagnosis Date Noted POA    PRINCIPAL PROBLEM:  Adjacent segment disease of lumbar spine with history of fusion procedure [M51.369, Z98.1] 04/08/2015 Not Applicable    Spinal stenosis of lumbar region with neurogenic claudication [M48.062] 05/15/2025 Yes    Type 2 diabetes mellitus, with long-term current use of insulin [E11.9, Z79.4] 05/15/2025 Not Applicable    CKD (chronic kidney disease), stage III [N18.30]  Yes     Chronic    Hypertriglyceridemia [E78.1] 10/05/2016 Yes    Type 2 diabetes mellitus with diabetic polyneuropathy, with long-term current use of insulin [E11.42, Z79.4] 10/04/2016 Not Applicable     Chronic    S/P liver transplant [Z94.4] 10/04/2016 Not Applicable "     Chronic    Essential hypertension [I10] 06/19/2015 Yes    Acquired hypothyroidism [E03.9]  Yes     Chronic    Chronic pain syndrome [G89.4] 07/13/2011 Yes    Gout, unspecified [M10.9] 03/14/2011 Yes      Problems Resolved During this Admission:       A/P:  POD #5 left L4-5 OLIF and posterior instrumentation     --Neurologically stable          -q4h neuro checks  --Imaging: Post op xrays pending  --Drains: Removed  --Pain control- toradol and valium ordered  --DVT ppx: TEDs/SCDs/SQH  --Activity: PT/OT, OOB. LSO brace  --Diet: Diabetic, Saline Lock IV  --Bowel regimen: PRN suppository, senna PRN  --Urinary: voiding  --Atelectasis ppx: Encourage IS hourly  --Appreciate HM recs     Dispo: Pending rehab/snf and imaging    All of the above discussed and reviewed with Dr. Payne.      Paige Gilbert PA-C  Neurosurgery  [place] [facility]

## 2025-05-20 NOTE — ASSESSMENT & PLAN NOTE
-Resume home insulin  -Accu-Cheks AC and HS    Patient's FSGs are uncontrolled due to hyperglycemia on current medication regimen.  Last A1c reviewed-   Lab Results   Component Value Date    HGBA1C 6.9 (H) 03/12/2025     Most recent fingerstick glucose reviewed-   Recent Labs   Lab 05/19/25  2304 05/20/25  0603 05/20/25  1119 05/20/25  1557   POCTGLUCOSE 173* 217* 198* 266*     Current correctional scale  Low  Maintain anti-hyperglycemic dose as follows-   Antihyperglycemics (From admission, onward)      Start     Stop Route Frequency Ordered    05/20/25 0900  insulin glargine U-100 (Lantus) pen 15 Units         -- SubQ Daily 05/19/25 0844    05/19/25 2245  insulin aspart U-100 pen 0-5 Units         -- SubQ Before meals & nightly PRN 05/19/25 2145          Hold Oral hypoglycemics while patient is in the hospital.

## 2025-05-20 NOTE — NURSING
Pt's bedtime . No current orders for sliding scale insulin. Dr. Parker notified. Order received for sliding scale insulin ACHS PRN.

## 2025-05-20 NOTE — PT/OT/SLP PROGRESS
"Physical Therapy Treatment    Patient Name:  Vick Gonzalez   MRN:  [id number]    Recommendations:     Discharge Recommendations: High Intensity Therapy  Discharge Equipment Recommendations: to be determined by next level of care, walker, rolling  Barriers to discharge: inaccessible home (stairs) and requires assist with functional mobility    Assessment:     Vick Gonzalez is a 69 y.o. male admitted with a medical diagnosis of Adjacent segment disease of lumbar spine with history of fusion procedure.  He presents with the following impairments/functional limitations: weakness, impaired endurance, impaired sensation, impaired self care skills, impaired functional mobility, gait instability, impaired balance, decreased coordination, decreased lower extremity function, decreased upper extremity function, pain, impaired skin, edema, orthopedic precautions     Patient seen for physical therapy session on this date, cotreatment with occupational therapy.  Patient is making progress toward PT goals, limited by B LE weakness and pain.  Recommending patient to discharge to High Intensity therapy, DME TBERNESTINA.  Full report to follow.    Rehab Prognosis: Good; patient would benefit from acute skilled PT services to address these deficits and reach maximum level of function.    Recent Surgery: Procedure(s) (LRB):  ARTHRODESIS, SPINE, LUMBAR, OLIF, MINIMALLY INVASIVE (N/A)  FACETECTOMY (N/A) 5 Days Post-Op    Plan:     During this hospitalization, patient to be seen daily to address the identified rehab impairments via gait training, therapeutic activities, therapeutic exercises, neuromuscular re-education, wheelchair management/training and progress toward the following goals:    Plan of Care Expires:  06/16/25    Subjective     Chief Complaint: "I am weak, I told y'all"  Patient/Family Comments/goals: to return to PLOF  Pain/Comfort:  Pain Rating 1: 10/10  Location - Side 1: Bilateral  Location - Orientation 1: lower  Location " 1: back  Pain Addressed 1: Pre-medicate for activity, Reposition, Distraction, Cessation of Activity  Pain Rating Post-Intervention 1:  (does not rate)      Objective:     Communicated with nurse Yumiko prior to session.  Patient found supine with bed alarm, peripheral IV upon PT entry to room.     General Precautions: Standard, fall, diabetic  Orthopedic Precautions: spinal precautions  Braces: LSO  Respiratory Status: Room air     Functional Mobility:  Bed Mobility:     Rolling Right: minimum assistance, VC for spinal precautions  Scooting: minimum assistance, VC  Supine to Sit: minimum assistance, dons LSO with mod assist and VCs  Transfers:     Sit to Stand:  moderate assistance and of 2 persons with rolling walker x 2 trials (on first attempt, unable to clear buttocks.  For second attempt, increased bed height to max level)  Bed to Cardiac Chair: maximal assistance and of 2 persons with  no AD  using  Scoot Pivot with max verbal cues and education on safety.    Jaci Steady Stands:  x 2 trials with min assist x 2, educated Nursing on use of Jaci Steady to return patient back to bed.        AM-PAC 6 CLICK MOBILITY  Turning over in bed (including adjusting bedclothes, sheets and blankets)?: 3  Sitting down on and standing up from a chair with arms (e.g., wheelchair, bedside commode, etc.): 2  Moving from lying on back to sitting on the side of the bed?: 3  Moving to and from a bed to a chair (including a wheelchair)?: 2  Need to walk in hospital room?: 1  Climbing 3-5 steps with a railing?: 1  Basic Mobility Total Score: 12       Treatment & Education:  Patient educated on spinal precautions.  Patient transitions to EOB sitting via log roll and min assist, donns LSO with mod assist and VC.  Patient attempts stand to RW with bed minimally elevated, unable to clear buttocks.  Patient then performs sit to stand with mod assist x 2 with hospital bed at max height.  B knees flexed, trunk flexed.  Patient performed  standing marching in place x ~15 reps with mod to max assist, poor ability to maintain Left knee extension in stance phase, max Verbal and tactile cues.  Patient tolerates standing x 2 minutes.  Patient then performs scoot pivot transfer to cardiac chair with max assist x 2 persons, max verbal and tactile cues.  Patient sits in cardiac chair and reports lightheadedness, BP at 135/89, nursing notified, cardiac chair reclined.  Patient then able to perform 2 stands from cardiac chair with Jaci Steady and min assist x 2 persons, therapy staff educated nursing on use of Jaci Steady for returning patient back to bed.  Yumiko present for training.      Patient left up in chair with all lines intact, call button in reach, and nurse notified..    GOALS:   Multidisciplinary Problems       Physical Therapy Goals          Problem: Physical Therapy    Goal Priority Disciplines Outcome Interventions   Physical Therapy Goal     PT, PT/OT Progressing    Description: Goals to be met by: discharge date     Patient will increase functional independence with mobility by performin. Supine to sit with Modified [place]  2. Sit to supine with Modified [place]  3. Rolling to Left and Right with Modified [place].  4. Sit to stand transfer with Modified [place]  5. Bed to chair transfer with Stand-by Assistance using Rolling Walker  6. Gait  x 150 feet with Stand-by Assistance using Rolling Walker.   7.  Able to don/doff LSO with mod independence                         DME Justifications:  No DME recommended requiring DME justifications    Time Tracking:     PT Received On: 25  PT Start Time: 1331     PT Stop Time: 1417  PT Total Time (min): 46 min     Billable Minutes: Therapeutic Activity 46    Treatment Type: Treatment  PT/PTA: PT     Number of PTA visits since last PT visit: 0     2025

## 2025-05-20 NOTE — NURSING
NP Arekeva ordered to hold short acting insulin and give long acting insulin. Short acting insulin discontinued.

## 2025-05-20 NOTE — PLAN OF CARE
Patient seen for physical therapy session on this date, cotreatment with occupational therapy.  Patient is making progress toward PT goals, limited by B LE weakness and pain.  Recommending patient to discharge to High Intensity therapy, JORDI RILEY.  Full report to follow.      Problem: Physical Therapy  Goal: Physical Therapy Goal  Description: Goals to be met by: discharge date     Patient will increase functional independence with mobility by performin. Supine to sit with Modified [place]  2. Sit to supine with Modified [place]  3. Rolling to Left and Right with Modified [place].  4. Sit to stand transfer with Modified [place]  5. Bed to chair transfer with Stand-by Assistance using Rolling Walker  6. Gait  x 150 feet with Stand-by Assistance using Rolling Walker.   7.  Able to don/doff LSO with mod independence    Outcome: Progressing

## 2025-05-20 NOTE — PLAN OF CARE
Problem: Adult Inpatient Plan of Care  Goal: Plan of Care Review  Outcome: Progressing  Goal: Absence of Hospital-Acquired Illness or Injury  Outcome: Progressing  Goal: Optimal Comfort and Wellbeing  Outcome: Progressing     Problem: Diabetes Comorbidity  Goal: Blood Glucose Level Within Targeted Range  Outcome: Progressing     Problem: Spinal Surgery  Goal: Absence of Bleeding  Outcome: Progressing  Goal: Absence of Infection Signs and Symptoms  Outcome: Progressing  Goal: Optimal Pain Control and Function  Outcome: Progressing  Goal: Effective Urinary Elimination  Outcome: Progressing  Goal: Effective Oxygenation and Ventilation  Outcome: Progressing     Problem: Skin Injury Risk Increased  Goal: Skin Health and Integrity  Outcome: Progressing     Problem: Fall Injury Risk  Goal: Absence of Fall and Fall-Related Injury  Outcome: Progressing     Pt AAOx4. Hypertensive overnight but BP better controlled this AM with dose of IV hydralazine. Other VSS. PRN dilaudid and oxycodone as well as scheduled Tylenol given for c/o pain. No c/o N/V. Medications administered per MAR. On RA. Blood glucose monitored, sliding scale insulin given. LLQ dressing intact with small amount of dried sanguineous drainage. Lower back dressing CDI. Bed alarm set, side rails raised, and call light in reach.

## 2025-05-20 NOTE — PROGRESS NOTES
[facility] Medicine  Progress Note    Patient Name: Vick Gonzalez  MRN: [id number]  Patient Class: IP- Inpatient   Admission Date: 5/15/2025  Length of Stay: 5 days  Attending Physician: Khadar Payne MD  Primary Care Provider: Emanuel Lopez MD        Subjective     Principal Problem:Adjacent segment disease of lumbar spine with history of fusion procedure        HPI:  Vick Gonzalez is a chronically ill 70-year-old male admitted to [facility] today for adjacent segment disease of the lumbar spine.  The patient has a long complex history of chronic lower back pain and has developed neurogenic claudication.  He is followed in outpatient setting by Dr. Khadar Payne.  On today, the patient underwent Left L4-5 oblique interbody fusion, placement of interbody spacer, DePuy Conduit LLIF spacer filled with allograft BMP and DBM, L4-S1 posterior segmental instrumentation using DePuy Viper Prime system, and  L4-5 laminectomy, medial facetectomy, foraminotomy.  Due to the severity of the patient's underlying comorbidities, Hospital Medicine was consulted for medical management.    Overview/Hospital Course:  No notes on file    Interval History:  Seen at the bedside.  No distress noted.  We will continue to follow along with primary team.  We will continue to monitor blood sugar.  Pain regimen adjusted per primary team.    Review of Systems   Constitutional:  Positive for activity change and fever.   HENT:  Negative for trouble swallowing.    Musculoskeletal:  Positive for arthralgias, back pain, gait problem, myalgias and neck pain.   Neurological:  Positive for numbness (Chronic).   Psychiatric/Behavioral:  Negative for confusion.      Objective:     Vital Signs (Most Recent):  Temp: 97.9 °F (36.6 °C) (05/20/25 1555)  Pulse: 85 (05/20/25 1555)  Resp: 18 (05/20/25 1555)  BP: 115/85 (05/20/25 1555)  SpO2: 96 % (05/20/25 1555) Vital Signs (24h Range):  Temp:  [97.7 °F (36.5 °C)-99.3 °F (37.4 °C)]  97.9 °F (36.6 °C)  Pulse:  [] 85  Resp:  [12-20] 18  SpO2:  [95 %-100 %] 96 %  BP: (115-205)/() 115/85     Weight: 116.1 kg (255 lb 15.3 oz)  Body mass index is 32.86 kg/m².    Intake/Output Summary (Last 24 hours) at 5/20/2025 1633  Last data filed at 5/20/2025 1207  Gross per 24 hour   Intake 120 ml   Output 2240 ml   Net -2120 ml         Physical Exam  Vitals and nursing note reviewed.   Constitutional:       Appearance: He is ill-appearing.   HENT:      Mouth/Throat:      Mouth: Mucous membranes are moist.   Eyes:      Extraocular Movements: Extraocular movements intact.   Cardiovascular:      Rate and Rhythm: Normal rate and regular rhythm.   Pulmonary:      Effort: Pulmonary effort is normal.   Neurological:      Mental Status: He is alert.      Motor: Weakness present.               Significant Labs: All pertinent labs within the past 24 hours have been reviewed.    Significant Imaging: I have reviewed all pertinent imaging results/findings within the past 24 hours.      Assessment & Plan  Adjacent segment disease of lumbar spine with history of fusion procedure  -Defer to neurosurgery to manage    Chronic pain syndrome  -Analgesic therapy as needed    Acquired hypothyroidism  -resume levothyroxine 88 mcg daily    Gout, unspecified  -Resume allopurinol    Essential hypertension  Patient's blood pressure range in the last 24 hours was: BP  Min: 115/85  Max: 205/87.The patient's inpatient anti-hypertensive regimen is listed below:  Current Antihypertensives  metoprolol succinate (TOPROL-XL) 24 hr tablet 200 mg, Daily, Oral  lisinopriL tablet 20 mg, Daily, Oral  hydrALAZINE injection 10 mg, Every 8 hours PRN, Intravenous    Plan  - BP is controlled, no changes needed to their regimen  Type 2 diabetes mellitus with diabetic polyneuropathy, with long-term current use of insulin  -Resume home insulin  -Accu-Cheks AC and HS    Patient's FSGs are uncontrolled due to hyperglycemia on current medication  regimen.  Last A1c reviewed-   Lab Results   Component Value Date    HGBA1C 6.9 (H) 03/12/2025     Most recent fingerstick glucose reviewed-   Recent Labs   Lab 05/19/25  2304 05/20/25  0603 05/20/25  1119 05/20/25  1557   POCTGLUCOSE 173* 217* 198* 266*     Current correctional scale  Low  Maintain anti-hyperglycemic dose as follows-   Antihyperglycemics (From admission, onward)      Start     Stop Route Frequency Ordered    05/20/25 0900  insulin glargine U-100 (Lantus) pen 15 Units         -- SubQ Daily 05/19/25 0844    05/19/25 2245  insulin aspart U-100 pen 0-5 Units         -- SubQ Before meals & nightly PRN 05/19/25 2145          Hold Oral hypoglycemics while patient is in the hospital.   S/P liver transplant  -Resume Prograf    Hypertriglyceridemia  -Resume home medications    CKD (chronic kidney disease), stage III  Creatine stable for now. BMP reviewed- noted Estimated Creatinine Clearance: 88.3 mL/min (based on SCr of 1.1 mg/dL). according to latest data. Based on current GFR, CKD stage is stage 3 - GFR 30-59.  Monitor UOP and serial BMP and adjust therapy as needed. Renally dose meds. Avoid nephrotoxic medications and procedures.  Spinal stenosis of lumbar region with neurogenic claudication  Per primary team    Type 2 diabetes mellitus, with long-term current use of insulin  Holding home meds  We will order insulin siding scale here in the hospital      VTE Risk Mitigation (From admission, onward)           Ordered     heparin (porcine) injection 5,000 Units  Every 12 hours         05/15/25 1625     IP VTE HIGH RISK PATIENT  Once         05/15/25 1625     Place sequential compression device  Until discontinued         05/15/25 1625                    Discharge Planning   JOJO: 5/22/2025     Code Status: Full Code   Medical Readiness for Discharge Date:   Discharge Plan A: Rehab                Please place Justification for DME        Gilson Silva PA-C  Department of [facility] Medicine   [facility]  Surg

## 2025-05-20 NOTE — SUBJECTIVE & OBJECTIVE
Interval History:  Seen at the bedside.  No distress noted.  We will continue to follow along with primary team.  We will continue to monitor blood sugar.  Pain regimen adjusted per primary team.    Review of Systems   Constitutional:  Positive for activity change and fever.   HENT:  Negative for trouble swallowing.    Musculoskeletal:  Positive for arthralgias, back pain, gait problem, myalgias and neck pain.   Neurological:  Positive for numbness (Chronic).   Psychiatric/Behavioral:  Negative for confusion.      Objective:     Vital Signs (Most Recent):  Temp: 97.9 °F (36.6 °C) (05/20/25 1555)  Pulse: 85 (05/20/25 1555)  Resp: 18 (05/20/25 1555)  BP: 115/85 (05/20/25 1555)  SpO2: 96 % (05/20/25 1555) Vital Signs (24h Range):  Temp:  [97.7 °F (36.5 °C)-99.3 °F (37.4 °C)] 97.9 °F (36.6 °C)  Pulse:  [] 85  Resp:  [12-20] 18  SpO2:  [95 %-100 %] 96 %  BP: (115-205)/() 115/85     Weight: 116.1 kg (255 lb 15.3 oz)  Body mass index is 32.86 kg/m².    Intake/Output Summary (Last 24 hours) at 5/20/2025 1633  Last data filed at 5/20/2025 1207  Gross per 24 hour   Intake 120 ml   Output 2240 ml   Net -2120 ml         Physical Exam  Vitals and nursing note reviewed.   Constitutional:       Appearance: He is ill-appearing.   HENT:      Mouth/Throat:      Mouth: Mucous membranes are moist.   Eyes:      Extraocular Movements: Extraocular movements intact.   Cardiovascular:      Rate and Rhythm: Normal rate and regular rhythm.   Pulmonary:      Effort: Pulmonary effort is normal.   Neurological:      Mental Status: He is alert.      Motor: Weakness present.               Significant Labs: All pertinent labs within the past 24 hours have been reviewed.    Significant Imaging: I have reviewed all pertinent imaging results/findings within the past 24 hours.

## 2025-05-20 NOTE — PLAN OF CARE
Pt was denied IPR by PHN, P2P offered. Waiting on response from Dr Payne if agreeable to do P2P. CM will continue to follow pt and provide any dc needs. If unable to admit to IPR, will send referral for SNF.     Future Appointments   Date Time Provider Department Center   5/22/2025 11:45 AM [facility]IC-US1 MASTER [facility]TR [facility]   5/30/2025  1:00 PM [facility] ODC XR-B LIMIT 500 LBS [facility] XRAY OP [facility]   5/30/2025  2:00 PM Paige Gilbert, PAUrvashiC [facility] NEUROSU [facility]   6/17/2025  8:30 AM LAB, [facility] LAB [place]   6/23/2025  4:00 PM Emanuel Lopez MD [facility] MED [facility]   6/30/2025  9:00 AM LAB, [facility] LAB [place]   7/1/2025  9:45 AM [facility] OIC-XRAY [facility] XRAY [facility]   7/1/2025 10:30 AM Oralia Rowland, NP [facility] NEUROS8 [facility]   7/24/2025  3:20 PM Paddy Segundo, OD [facility] OPTOMTY [facility]   8/19/2025  9:00 AM [facility] ORTHO CLINIC LIMIT 350LBS [facility] ORTHXR [facility]   8/19/2025 10:00 AM Khadar Payne MD [facility] NEUROSU [facility]      05/20/25 1445   Rounds   Attendance Nurse    Discharge Plan A Rehab   Why the patient remains in the hospital Requires continued medical care   Transition of Care Barriers None

## 2025-05-20 NOTE — PLAN OF CARE
Patient is awake and alert. Patient worked with Physical Therapy. PRN Dilaudid and Oxycodone given for pain. Urinal at bedside. Blood glucose monitoring maintained. Aspen brace at bedside for patient to wear when out of bed. Waffle mattress in place. Bed alarm set. Instructed to use call light for assistance. Safety maintained.          Problem: Adult Inpatient Plan of Care  Goal: Plan of Care Review  Outcome: Progressing     Problem: Diabetes Comorbidity  Goal: Blood Glucose Level Within Targeted Range  Outcome: Progressing     Problem: Spinal Surgery  Goal: Optimal Coping with Surgery  Outcome: Progressing

## 2025-05-20 NOTE — PT/OT/SLP PROGRESS
Occupational Therapy  Co-Treatment w/ PT     Name: Vick Gonzalez  MRN: [id number]  Admitting Diagnosis:  Adjacent segment disease of lumbar spine with history of fusion procedure  5 Days Post-Op  The primary encounter diagnosis was Adjacent segment disease of lumbar spine with history of fusion procedure. Diagnoses of Stage 3 chronic kidney disease, unspecified whether stage 3a or 3b CKD, Spinal stenosis of lumbar region with neurogenic claudication, and Type 2 diabetes mellitus with diabetic polyneuropathy, with long-term current use of insulin were also pertinent to this visit.  Pre-op Diagnosis: Adjacent segment disease of lumbar spine with history of fusion procedure [M51.369, Z98.1] s/p Procedure(s):  ARTHRODESIS, SPINE, LUMBAR, OLIF, MINIMALLY INVASIVE  FACETECTOMY     Recommendations:     Discharge Recommendations: High Intensity Therapy  Discharge Equipment Recommendations:  to be determined by next level of care, walker, rolling  Barriers to discharge:  Decreased caregiver support, Inaccessible home environment (increased level of assist)    Assessment:     Vick Gonzalez is a 69 y.o. male with a medical diagnosis of Adjacent segment disease of lumbar spine with history of fusion procedure.  He presents with Performance deficits affecting function are weakness, impaired endurance, impaired self care skills, impaired functional mobility, gait instability, impaired balance, decreased upper extremity function, decreased lower extremity function, decreased safety awareness, pain, decreased ROM, impaired coordination, impaired skin, edema, orthopedic precautions.     Pt transitioned to  EOB w/ min A supine>sit. sitting balance SBA  on EOB. Pt donned LSO brace Mod A in sitting. Pt had difficulty manipulating items  and straps due to neuropathy in hands. He was unable to stand up from bed at standard height w/ Max A x 2 person Assist w/ RW due to LE weakness. Bed raised extra high, pt able to stand Mod A x 2 w/  "RW, standing balance Min A x 2  person assist w/ BUE support on RW; knees buckling mary on Left. Pt needed max vc, tc to fully extended knees  for safety to prevent from falling. Pt performed standing marches Mod A x 2 person assist w/ RW; LLE weaker than right and was buckling more and pt requiring max vc to fully extend L knee. Pt was unable to take any steps due to high fall risk from buckling knees. BS chair too low for pt to safely stand from and  so cardiac chair used instead. Pt transferred Max A x 2 scoot pivot to L side w/arm rest dropped down to cardiac chair. Pt was reclined in chair and pelvic strap fasten for safety. Nurse was in room, educated /instructed on [employer] device to transfer pt BTB when ready. He stood 2 subsequent times from cardiac chair using Jaci Stedy w/ Min A x 2  for training w/ his nurse. Pt agreeable to remain sitting UIC as he verbalized, "I want to get out of the bed." . Pt endorsed feeling dizziness while UIC. /66, HR 89.  Pt left in care of nurse and all needs within reach.       Rehab Prognosis:  Fair; patient would benefit from acute skilled OT services to address these deficits and reach maximum level of function.       Plan:     Patient to be seen 5 x/week to address the above listed problems via self-care/home management, therapeutic activities, therapeutic exercises  Plan of Care Expires: 06/16/25  Plan of Care Reviewed with: patient    Subjective     Chief Complaint: back pain, not rated. Nurse premedicated pt for OT/PT cotx session,.  Patient/Family Comments/goals: I want to get out of the bed.   Pain/Comfort:  Pain Rating 1:  (not rated)  Location - Side 1: Bilateral  Location - Orientation 1: lower  Location 1: back  Pain Addressed 1: Reposition, Distraction, Cessation of Activity, Pre-medicate for activity  Pain Rating Post-Intervention 1:  (not rated)    Objective:     Communicated with: nurse prior to session.  Patient found HOB elevated with bed alarm upon " OT entry to room.    General Precautions: Standard, fall, diabetic    Orthopedic Precautions:spinal precautions  Braces: LSO  Respiratory Status: Room air     Occupational Performance:     Bed Mobility:    Patient completed Rolling/Turning to Right with minimum assistance, with side rail, and log roll   Patient completed Scooting/Bridging with minimum assistance  Patient completed Supine to Sit with minimum assistance, with side rail, and log roll     Functional Mobility/Transfers:  Patient completed Sit <> Stand Transfer with moderate assistance and of 2 persons  with  rolling walker and bed raised extra high; first attempt, pt unable to stand from standard bed height; 2 additional trials from Jaci steady Min A x 2 person assist(pt pulling up on [employer] bar)  Patient completed Bed <> Chair Transfer using Scoot Pivot technique with maximal assistance and of 2 persons with no assistive device. Cardiac chair, arm rest dropped down   Functional Mobility: NA    Activities of Daily Living:  Upper Body Dressing: moderate assistance to don LSO brace seated EOB      Treatment & Education:  Pt seen for safe self care activities and fx mobility retraining as stated above.  All questions/concerns addressed within scope.  Call staff for BTB/OOB assist. Call bell use explained. Pt acknowledged.  Purpose of OT and POC  Impt of OOB activity and sitting UIC explained to pt to prevent negative effects of prolonged bed rest. Pt verbalized understanding and agreeable to sit UIC.       Patient left up in chair with all lines intact, call button in reach, nurse Yumiko notified, and Yumiko present    GOALS:   Multidisciplinary Problems       Occupational Therapy Goals          Problem: Occupational Therapy    Goal Priority Disciplines Outcome Interventions   Occupational Therapy Goal     OT, PT/OT Progressing    Description: Goals to be met by: 6/16/25     Patient will increase functional independence with ADLs by performing:    VAUGHN  Dressing with Stand-by Assistance using reacher.  Grooming while EOB with Set-up Assistance.  Toileting from toilet with Minimal Assistance for hygiene and clothing management.   Rolling to Bilateral with Stand-by Assistance.   Supine to sit with Minimal Assistance.  Stand pivot transfers with Minimal Assistance.  Toilet transfer to bedside commode with Minimal Assistance.                         DME Justifications:  No DME recommended requiring DME justifications    Time Tracking:     OT Date of Treatment: 05/20/25  OT Start Time: 1331  OT Stop Time: 1417  OT Total Time (min): 46 min    Billable Minutes:Self Care/Home Management 10  Therapeutic Activity 36  Total Time 46    OT/CASSANDRA: OT     Number of CASSANDRA visits since last OT visit: 0    5/20/2025

## 2025-05-20 NOTE — NURSING
Patient B/P is 196/95 MD Payne and NP Demariokeva notified. Patient given PRN Hydralazine. MD aware. Patient monitored and B/P rechecked.

## 2025-05-21 LAB
GLUCOSE SERPL-MCNC: 162 MG/DL (ref 70–110)
POCT GLUCOSE: 162 MG/DL (ref 70–110)
POCT GLUCOSE: 177 MG/DL (ref 70–110)
POCT GLUCOSE: 207 MG/DL (ref 70–110)
POCT GLUCOSE: 210 MG/DL (ref 70–110)

## 2025-05-21 PROCEDURE — 11000001 HC ACUTE MED/SURG PRIVATE ROOM

## 2025-05-21 PROCEDURE — 97535 SELF CARE MNGMENT TRAINING: CPT

## 2025-05-21 PROCEDURE — 99900035 HC TECH TIME PER 15 MIN (STAT)

## 2025-05-21 PROCEDURE — 97530 THERAPEUTIC ACTIVITIES: CPT | Mod: CQ

## 2025-05-21 PROCEDURE — 63600175 PHARM REV CODE 636 W HCPCS: Performed by: NEUROLOGICAL SURGERY

## 2025-05-21 PROCEDURE — 97110 THERAPEUTIC EXERCISES: CPT | Mod: CQ

## 2025-05-21 PROCEDURE — 94799 UNLISTED PULMONARY SVC/PX: CPT

## 2025-05-21 PROCEDURE — 25000003 PHARM REV CODE 250: Performed by: NEUROLOGICAL SURGERY

## 2025-05-21 PROCEDURE — 97530 THERAPEUTIC ACTIVITIES: CPT

## 2025-05-21 PROCEDURE — 25000003 PHARM REV CODE 250: Performed by: NURSE PRACTITIONER

## 2025-05-21 PROCEDURE — 94761 N-INVAS EAR/PLS OXIMETRY MLT: CPT

## 2025-05-21 RX ORDER — METHOCARBAMOL 500 MG/1
1000 TABLET, FILM COATED ORAL 3 TIMES DAILY
Status: DISCONTINUED | OUTPATIENT
Start: 2025-05-21 | End: 2025-05-23 | Stop reason: HOSPADM

## 2025-05-21 RX ORDER — KETOROLAC TROMETHAMINE 30 MG/ML
15 INJECTION, SOLUTION INTRAMUSCULAR; INTRAVENOUS EVERY 6 HOURS
Status: COMPLETED | OUTPATIENT
Start: 2025-05-21 | End: 2025-05-21

## 2025-05-21 RX ADMIN — ALUMINUM HYDROXIDE, MAGNESIUM HYDROXIDE, AND DIMETHICONE 30 ML: 200; 20; 200 SUSPENSION ORAL at 04:05

## 2025-05-21 RX ADMIN — LISINOPRIL 20 MG: 20 TABLET ORAL at 08:05

## 2025-05-21 RX ADMIN — ACETAMINOPHEN 650 MG: 325 TABLET ORAL at 12:05

## 2025-05-21 RX ADMIN — ATORVASTATIN CALCIUM 40 MG: 40 TABLET, FILM COATED ORAL at 08:05

## 2025-05-21 RX ADMIN — POLYETHYLENE GLYCOL 3350 17 G: 17 POWDER, FOR SOLUTION ORAL at 08:05

## 2025-05-21 RX ADMIN — GABAPENTIN 900 MG: 300 CAPSULE ORAL at 09:05

## 2025-05-21 RX ADMIN — METHOCARBAMOL TABLETS 1000 MG: 500 TABLET, COATED ORAL at 02:05

## 2025-05-21 RX ADMIN — BISACODYL 10 MG: 10 SUPPOSITORY RECTAL at 08:05

## 2025-05-21 RX ADMIN — ALLOPURINOL 100 MG: 100 TABLET ORAL at 09:05

## 2025-05-21 RX ADMIN — GABAPENTIN 900 MG: 300 CAPSULE ORAL at 08:05

## 2025-05-21 RX ADMIN — GABAPENTIN 900 MG: 300 CAPSULE ORAL at 02:05

## 2025-05-21 RX ADMIN — OXYCODONE 15 MG: 5 TABLET ORAL at 04:05

## 2025-05-21 RX ADMIN — KETOROLAC TROMETHAMINE 15 MG: 30 INJECTION, SOLUTION INTRAMUSCULAR; INTRAVENOUS at 02:05

## 2025-05-21 RX ADMIN — LEVOTHYROXINE SODIUM 88 MCG: 88 TABLET ORAL at 08:05

## 2025-05-21 RX ADMIN — ACETAMINOPHEN 650 MG: 325 TABLET ORAL at 05:05

## 2025-05-21 RX ADMIN — ALLOPURINOL 100 MG: 100 TABLET ORAL at 08:05

## 2025-05-21 RX ADMIN — OXYCODONE 15 MG: 5 TABLET ORAL at 12:05

## 2025-05-21 RX ADMIN — OXYCODONE 15 MG: 5 TABLET ORAL at 09:05

## 2025-05-21 RX ADMIN — TACROLIMUS 1 MG: 1 CAPSULE ORAL at 06:05

## 2025-05-21 RX ADMIN — TRAZODONE HYDROCHLORIDE 50 MG: 50 TABLET ORAL at 09:05

## 2025-05-21 RX ADMIN — ACETAMINOPHEN 650 MG: 325 TABLET ORAL at 11:05

## 2025-05-21 RX ADMIN — HEPARIN SODIUM 5000 UNITS: 5000 INJECTION INTRAVENOUS; SUBCUTANEOUS at 08:05

## 2025-05-21 RX ADMIN — ALUMINUM HYDROXIDE, MAGNESIUM HYDROXIDE, AND DIMETHICONE 30 ML: 200; 20; 200 SUSPENSION ORAL at 10:05

## 2025-05-21 RX ADMIN — ACETAMINOPHEN 650 MG: 325 TABLET ORAL at 06:05

## 2025-05-21 RX ADMIN — KETOROLAC TROMETHAMINE 15 MG: 30 INJECTION, SOLUTION INTRAMUSCULAR; INTRAVENOUS at 06:05

## 2025-05-21 RX ADMIN — INSULIN GLARGINE 15 UNITS: 100 INJECTION, SOLUTION SUBCUTANEOUS at 08:05

## 2025-05-21 RX ADMIN — METOPROLOL SUCCINATE 200 MG: 50 TABLET, EXTENDED RELEASE ORAL at 08:05

## 2025-05-21 RX ADMIN — HYDROMORPHONE HYDROCHLORIDE 2 MG: 2 INJECTION INTRAMUSCULAR; INTRAVENOUS; SUBCUTANEOUS at 08:05

## 2025-05-21 RX ADMIN — TACROLIMUS 2 MG: 1 CAPSULE ORAL at 08:05

## 2025-05-21 RX ADMIN — OXYCODONE 15 MG: 5 TABLET ORAL at 10:05

## 2025-05-21 RX ADMIN — INSULIN ASPART 1 UNITS: 100 INJECTION, SOLUTION INTRAVENOUS; SUBCUTANEOUS at 09:05

## 2025-05-21 RX ADMIN — HEPARIN SODIUM 5000 UNITS: 5000 INJECTION INTRAVENOUS; SUBCUTANEOUS at 09:05

## 2025-05-21 RX ADMIN — HYDROMORPHONE HYDROCHLORIDE 2 MG: 2 INJECTION INTRAMUSCULAR; INTRAVENOUS; SUBCUTANEOUS at 05:05

## 2025-05-21 RX ADMIN — METHOCARBAMOL TABLETS 1000 MG: 500 TABLET, COATED ORAL at 09:05

## 2025-05-21 RX ADMIN — METHOCARBAMOL 750 MG: 750 TABLET ORAL at 08:05

## 2025-05-21 RX ADMIN — KETOROLAC TROMETHAMINE 15 MG: 30 INJECTION, SOLUTION INTRAMUSCULAR; INTRAVENOUS at 12:05

## 2025-05-21 RX ADMIN — INSULIN ASPART 2 UNITS: 100 INJECTION, SOLUTION INTRAVENOUS; SUBCUTANEOUS at 12:05

## 2025-05-21 RX ADMIN — HYDROMORPHONE HYDROCHLORIDE 2 MG: 2 INJECTION INTRAMUSCULAR; INTRAVENOUS; SUBCUTANEOUS at 12:05

## 2025-05-21 RX ADMIN — KETOROLAC TROMETHAMINE 15 MG: 30 INJECTION, SOLUTION INTRAMUSCULAR; INTRAVENOUS at 11:05

## 2025-05-21 NOTE — PLAN OF CARE
Problem: Occupational Therapy  Goal: Occupational Therapy Goal  Description: Goals to be met by: 6/16/25     Patient will increase functional independence with ADLs by performing:    LE Dressing with Stand-by Assistance using reacher.  Grooming while EOB with Set-up Assistance.  Toileting from toilet with Minimal Assistance for hygiene and clothing management.   Rolling to Bilateral with Stand-by Assistance.   Supine to sit with Minimal Assistance.  Stand pivot transfers with Minimal Assistance.  Toilet transfer to bedside commode with Minimal Assistance.    Outcome: Progressing     Problem: Occupational Therapy  Goal: Occupational Therapy Goal  Description: Goals to be met by: 6/16/25     Patient will increase functional independence with ADLs by performing:    LE Dressing with Stand-by Assistance using reacher.  Grooming while EOB with Set-up Assistance.  Toileting from toilet with Minimal Assistance for hygiene and clothing management.   Rolling to Bilateral with Stand-by Assistance.   Supine to sit with Minimal Assistance.Goal met 5/21/2025  Stand pivot transfers with Minimal Assistance.  Toilet transfer to bedside commode with Minimal Assistance.      5/21/2025 1714 by Paola Mendiola, GEOFFREY  Outcome: Progressing   Pt met 1/7 OT goals this date. Continue with OT POC.

## 2025-05-21 NOTE — PLAN OF CARE
Problem: Physical Therapy  Goal: Physical Therapy Goal  Description: Goals to be met by: discharge date     Patient will increase functional independence with mobility by performin. Supine to sit with Modified [place]  2. Sit to supine with Modified [place]  3. Rolling to Left and Right with Modified [place].  4. Sit to stand transfer with Modified [place]  5. Bed to chair transfer with Stand-by Assistance using Rolling Walker  6. Gait  x 150 feet with Stand-by Assistance using Rolling Walker.   7.  Able to don/doff LSO with mod independence    Outcome: Progressing

## 2025-05-21 NOTE — NURSING
Received report from Marcos, received the patient with eyes closed, call light in right hand, respirations even and unlabored, bed locked in low with alarm on.

## 2025-05-21 NOTE — PT/OT/SLP PROGRESS
Physical Therapy Treatment    Patient Name:  Vick Gonzalez   MRN:  [id number]    Recommendations:     Discharge Recommendations: High Intensity Therapy  Discharge Equipment Recommendations: to be determined by next level of care, walker, rolling  Barriers to discharge: decreased mobility,strength and endurance    Assessment:     Vick Gonzalez is a 69 y.o. male admitted with a medical diagnosis of Adjacent segment disease of lumbar spine with history of fusion procedure.  He presents with the following impairments/functional limitations: weakness, impaired endurance, impaired functional mobility, gait instability, impaired balance, decreased upper extremity function, decreased lower extremity function, pain, decreased ROM, impaired coordination, impaired skin,pt with good participation and requires assistance with mobility at this time,pt will benefit from high intensity therapy upon discharge.    Rehab Prognosis: Good; patient would benefit from acute skilled PT services to address these deficits and reach maximum level of function.    Recent Surgery: Procedure(s) (LRB):  ARTHRODESIS, SPINE, LUMBAR, OLIF, MINIMALLY INVASIVE (N/A)  FACETECTOMY (N/A) 6 Days Post-Op    Plan:     During this hospitalization, patient to be seen daily to address the identified rehab impairments via gait training, therapeutic activities, therapeutic exercises, neuromuscular re-education and progress toward the following goals:    Plan of Care Expires:  06/16/25    Subjective     Chief Complaint: n/a  Patient/Family Comments/goals: pt agreeable to rx.  Pain/Comfort:  Pain Rating 1:  (no rating)  Location - Orientation 1: lower  Location 1: back  Pain Addressed 1: Reposition, Distraction      Objective:     Communicated with nsg prior to session.  Patient found supine with bed alarm, peripheral IV upon PT entry to room.     General Precautions: Standard, diabetic, fall  Orthopedic Precautions: spinal precautions  Braces: LSO  Respiratory  Status: Room air     Functional Mobility:  Bed Mobility:     Supine to Sit: supervision  Sit to Supine: contact guard assistance  Transfers:     Sit to Stand:  minimum assistance, of 2 persons, and other trials to be documented with rolling walker and Jaci Steady  Balance: fair sitting balance      AM-PAC 6 CLICK MOBILITY  Turning over in bed (including adjusting bedclothes, sheets and blankets)?: 3  Sitting down on and standing up from a chair with arms (e.g., wheelchair, bedside commode, etc.): 2  Moving from lying on back to sitting on the side of the bed?: 3  Moving to and from a bed to a chair (including a wheelchair)?: 2  Need to walk in hospital room?: 1  Climbing 3-5 steps with a railing?: 1  Basic Mobility Total Score: 12       Treatment & Education: Standinst trial: sit-stand to Jaci Steady X 2 trials with CGA from bed with Min A X 2, 2nd trial: sit-stand to Jaci Steady X 2 trials from Jaci Steady Seat with CGA X 2 using pull bar, 3rd trial: from Jaci Steady seat X 3 trials with SBA using pull bar, pt performed lateral shifts X 2 trials and marching in place X 2 trials inside Jaci Steady with CGA X 2 and 10 reps per trial,sit- stand to RW with Min A X 2 from bed and marching in place X 10 reps inside RW,sidesteps to HOB 5-6 steps with RW with Min A X 2,muscle fatigue post rx,rest periods between each set of standing trials provided.      Patient left supine with all lines intact, call button in reach, and bed alarm on..    GOALS: see general POC  Multidisciplinary Problems       Physical Therapy Goals          Problem: Physical Therapy    Goal Priority Disciplines Outcome Interventions   Physical Therapy Goal     PT, PT/OT Progressing    Description: Goals to be met by: discharge date     Patient will increase functional independence with mobility by performin. Supine to sit with Modified [place]  2. Sit to supine with Modified [place]  3. Rolling to Left and Right with Modified  [place].  4. Sit to stand transfer with Modified [place]  5. Bed to chair transfer with Stand-by Assistance using Rolling Walker  6. Gait  x 150 feet with Stand-by Assistance using Rolling Walker.   7.  Able to don/doff LSO with mod independence                         DME Justifications:  No DME recommended requiring DME justifications    Time Tracking:     PT Received On: 05/21/25  PT Start Time: 1422     PT Stop Time: 1503  PT Total Time (min): 41 min     Billable Minutes: Therapeutic Activity 30 and Therapeutic Exercise 11    Treatment Type: Treatment  PT/PTA: PTA     Number of PTA visits since last PT visit: 1 05/21/2025

## 2025-05-21 NOTE — PLAN OF CARE
P2P was offered to Dr Payne, He requested SNF. Referrals were sent to [facility] and [facility] swing bed. Pt and spouse refuse nursing home SNF placement. Denied by [facility], waiting on Estevan from [facility] admissions [phone] to let me know if able to accept pt. CM will continue to follow pt and provide any dc needs.     Future Appointments   Date Time Provider Department Center   5/22/2025 11:45 AM NOM OIC-US1 [facility] ULTR [facility]   5/30/2025  1:00 PM [facility] ODC XR-B LIMIT 500 LBS [facility] XRAY OP [place] [facility]   5/30/2025  2:00 PM Paige Gilbert, PA-C [facility] NEUROSU [place] [facility]   6/17/2025  8:30 AM LAB [facility] LAB [place]   6/23/2025  4:00 PM Emanuel Lopez MD [facility] FAM MED [facility]   6/30/2025  9:00 AM LAB [facility] LAB [place]   7/1/2025  9:45 AM [facility] OIC-XRAY NOM XRAY [facility]   7/1/2025 10:30 AM Oralia Rowland, NP [facility] NEUROS8 [facility]   7/24/2025  3:20 PM Paddy Segundo, OD [facility] OPTOMTY [facility]   8/19/2025  9:00 AM [facility] ORTHO CLINIC LIMIT 350LBS [facility] ORTHXR [facility]   8/19/2025 10:00 AM Khadar Payne MD [facility] NEUROSU [facility]      05/21/25 1628   Rounds   Attendance Nurse    Discharge Plan A Skilled Nursing Facility   Why the patient remains in the hospital Requires continued medical care   Transition of Care Barriers None

## 2025-05-21 NOTE — PT/OT/SLP PROGRESS
Occupational Therapy   Co-Treatment w/ PTA    Name: Vick Gonzalez  MRN: [id number]  Admitting Diagnosis:  Adjacent segment disease of lumbar spine with history of fusion procedure  6 Days Post-Op  Pre-op Diagnosis: Adjacent segment disease of lumbar spine with history of fusion procedure [M51.369, Z98.1] s/p Procedure(s):  ARTHRODESIS, SPINE, LUMBAR, OLIF, MINIMALLY INVASIVE  FACETECTOMY   The primary encounter diagnosis was Adjacent segment disease of lumbar spine with history of fusion procedure. Diagnoses of Stage 3 chronic kidney disease, unspecified whether stage 3a or 3b CKD, Spinal stenosis of lumbar region with neurogenic claudication, and Type 2 diabetes mellitus with diabetic polyneuropathy, with long-term current use of insulin were also pertinent to this visit.    Recommendations:     Discharge Recommendations: High Intensity Therapy  Discharge Equipment Recommendations:  to be determined by next level of care, bedside commode, bath bench  Barriers to discharge:  Decreased caregiver support, Inaccessible home environment (increased level of assist)    Assessment:     Vick Gonzalez is a 69 y.o. male with a medical diagnosis of Adjacent segment disease of lumbar spine with history of fusion procedure.  He presents with Performance deficits affecting function are weakness, impaired endurance, impaired self care skills, impaired functional mobility, gait instability, impaired balance, decreased upper extremity function, decreased lower extremity function, decreased safety awareness, pain, decreased ROM, impaired coordination, impaired skin, edema, orthopedic precautions.     Pt w/ improvement in LE strength,coordination and overall mobility and self care act. Pt  performed bed mobility w/ Supervision for supine>sit, CGA sit>supine, min vc for  adherence to spinal precautions/log roll. Pt sat unsupported EOB w/ supervision and donned LSO brace w/ SBA and min vc. L hand incoordination due to numbness per pt  report. Pt performed standing ex in [employer] device prior to fx mobility retraining w/ RW. After ex, pt improved in sit<>stand w/ Min A x 2 person assist, RW and bed raised moderately; this is an improvement from yesterday w/ bed needing to bed raised max height in order for pt to stand up. Pt  ambulated side steps w/ Min A x 2 person assist w/ RW approx 5-6 steps to left side  toward HOB; knees not buckling however becoming soft and fatigued at end and pt needing to sit down on bed to rest. Pt would greatly benefit from HIT at dc. Continue with OT POC.     Rehab Prognosis:  Good; patient would benefit from acute skilled OT services to address these deficits and reach maximum level of function.       Plan:     Patient to be seen 5 x/week to address the above listed problems via self-care/home management, therapeutic activities, therapeutic exercises  Plan of Care Expires: 06/16/25  Plan of Care Reviewed with: patient    Subjective     Chief Complaint: back pain and L hand numbness.  Patient/Family Comments/goals: pt agreeable to OT,.  Pain/Comfort:  Pain Rating 1:  (not rated)  Location - Orientation 1: lower  Location 1: back  Pain Addressed 1: Reposition, Distraction  Pain Rating Post-Intervention 1:  (same not rated)    Objective:     Communicated with: nurse prior to session.  Patient found HOB elevated with bed alarm, peripheral IV upon OT entry to room.    General Precautions: Standard, fall, diabetic    Orthopedic Precautions:spinal precautions  Braces: LSO  Respiratory Status: Room air     Occupational Performance:     Bed Mobility:    Patient completed Rolling/Turning to Left with  supervision and with side rail  Patient completed Scooting/Bridging with contact guard assistance  Patient completed Supine to Sit with supervision and with side rail  Patient completed Sit to Supine with contact guard assistance and with side rail     Functional Mobility/Transfers:  Patient completed Sit <> Stand Transfer with  minimum assistance and of 2 persons  with  rolling walker and bed moderately elevated   Functional Mobility: pt ambulated Min A x 2 w/ RW 5-6 side steps along bed toward HOB. Knees fatigued, had to sit and rest. No buckling.    Activities of Daily Living:  Upper Body Dressing: minimum assistance to don/doff gown like robe to reach around upper back, otherwise pt threaded arms w/o help. Pt stand by assistance to don/doff LSO brace seated EOB.   Toileting: used urinal w/o help lying in bed.       [facility] 6 Click ADL: 17    Treatment & Education:  Pt performed standing ex prior fx mobility retraining:  Pt initially performed sit<>stand w/ Mod A x 2  person assist w/ RW with bed elevated max height.in order to setup [employer] seat under buttocks.  Pt then performed 2 sit <>stand trials w/ Min A x 2 persons from [employer] pulling up on bar w/ momentum.  2 standing trials performed, 1 minute each. Standing balance CGA x 2 person assist with BUE support on SS bar, knees extended, no buckling. Pt relying heavily on arms to keep balance. Vc to relax shlds as able to support self on legs.   Pt progressed to perform sit<> stand CGA x 2 person assist in SS pulling up on bar.  He performed standing ws'ing ex L <>R while in [employer] with hands on bar and CGA x 2 person assist. Knees soft , no buckling, vc to extend L knee more when transferring weight. Pt performed 10 reps x 2 trials. Brief seated rest after every set.   Pt performed 2 reps sit<> Jaci Stedy w/ SBA pulling up on bar to perform standing marches. Pt performed standing marches holding onto bar with BUEand CGA x 2 person assist. 10 reps x 2 sets, seated rest after every set.  Pt performed another sit<> [employer] w/ SBA pulling up on bar. He Maintained static standing SBA holding onto bar w/ BUE,  knees extended and trunk upright as the [employer] seat removed and pt sat down on bed w/ Min A x 2 person assist.   Jaci Stedy removed, pt the able to  stand Min A x 2 persons assist w/ RW w/ bed elevated moderately. Maintained standing balance CGA x 2 persons assist w/ RW; knees extended and pt side stepped as above.          Patient left HOB elevated with all lines intact, call button in reach, and bed alarm on    GOALS:   Multidisciplinary Problems       Occupational Therapy Goals          Problem: Occupational Therapy    Goal Priority Disciplines Outcome Interventions   Occupational Therapy Goal     OT, PT/OT Progressing    Description: Goals to be met by: 6/16/25     Patient will increase functional independence with ADLs by performing:    LE Dressing with Stand-by Assistance using reacher.  Grooming while EOB with Set-up Assistance.  Toileting from toilet with Minimal Assistance for hygiene and clothing management.   Rolling to Bilateral with Stand-by Assistance.   Supine to sit with Minimal Assistance.Goal met 5/21/2025  Stand pivot transfers with Minimal Assistance.  Toilet transfer to bedside commode with Minimal Assistance.                         DME Justifications:  No DME recommended requiring DME justifications    Time Tracking:     OT Date of Treatment: 05/21/25  OT Start Time: 1422  OT Stop Time: 1503  OT Total Time (min): 41 min    Billable Minutes:Self Care/Home Management 10  Therapeutic Activity 31  Total Time 41    OT/CASSANDRA: OT     Number of CASSANDRA visits since last OT visit: 0    5/21/2025

## 2025-05-21 NOTE — PLAN OF CARE
Problem: Adult Inpatient Plan of Care  Goal: Plan of Care Review  Outcome: Progressing  Goal: Patient-Specific Goal (Individualized)  Outcome: Progressing  Goal: Absence of Hospital-Acquired Illness or Injury  Outcome: Progressing  Goal: Optimal Comfort and Wellbeing  Outcome: Progressing  Goal: Readiness for Transition of Care  Outcome: Progressing     Problem: Diabetes Comorbidity  Goal: Blood Glucose Level Within Targeted Range  Outcome: Progressing      Quality 226: Preventive Care And Screening: Tobacco Use: Screening And Cessation Intervention: Patient screened for tobacco use and is an ex/non-smoker Detail Level: Detailed

## 2025-05-21 NOTE — PROGRESS NOTES
[facility]  Neurosurgery  Progress Note    Subjective:     Interval History: Back pain.  BL leg weakness.  Stood with PT and got to chair yesterday.    History of Present Illness:     He is complaining of worsening low back pain, radiating down the bilateral buttock and posterolateral thigh.  He has difficulty standing upright and has to lean forward when he walks.  He is walking using a cane.  Pain is affecting his ability to stand, walk.  He is unable to do much physical activity at this time due to the severe pain.  He takes gabapentin 800 mg 3 times daily.  He takes Percocet 10 mg 3 times daily.  He is a nonsmoker.  He is not drinking at this time.     He is using his cervical spinal cord stimulator.  His neck pain has significantly improved following the spinal cord stimulator placement.  He does not complain of severe left arm pain anymore.  He has gait imbalance that has remained stable.    Post-Op Info:  Procedure(s) (LRB):  ARTHRODESIS, SPINE, LUMBAR, OLIF, MINIMALLY INVASIVE (N/A)  FACETECTOMY (N/A)   6 Days Post-Op      Medications:  Continuous Infusions:      Scheduled Meds:   acetaminophen  650 mg Oral Q6H    allopurinoL  100 mg Oral BID    aluminum-magnesium hydroxide-simethicone  30 mL Oral QID (AC & HS)    atorvastatin  40 mg Oral Daily    bisacodyL  10 mg Rectal Daily    gabapentin  900 mg Oral TID    heparin (porcine)  5,000 Units Subcutaneous Q12H    insulin glargine U-100 (Lantus)  15 Units Subcutaneous Daily    levothyroxine  88 mcg Oral Daily    lisinopriL  20 mg Oral Daily    methocarbamoL  750 mg Oral TID    metoprolol succinate  200 mg Oral Daily    polyethylene glycol  17 g Oral Daily    tacrolimus  2 mg Oral Daily AM    And    tacrolimus  1 mg Oral Daily PM    traZODone  50 mg Oral QHS     PRN Meds:  Current Facility-Administered Medications:     aluminum-magnesium hydroxide-simethicone, 30 mL, Oral, Q4H PRN    dextrose 50%, 12.5 g, Intravenous, PRN    dextrose 50%, 25 g,  "Intravenous, PRN    diazePAM, 5 mg, Oral, Q8H PRN    glucagon (human recombinant), 1 mg, Intramuscular, PRN    glucose, 16 g, Oral, PRN    glucose, 24 g, Oral, PRN    hydrALAZINE, 10 mg, Intravenous, Q8H PRN    HYDROmorphone, 2 mg, Intravenous, Q3H PRN    insulin aspart U-100, 0-5 Units, Subcutaneous, QID (AC + HS) PRN    loperamide, 2 mg, Oral, QID PRN    ondansetron, 8 mg, Oral, Q6H PRN    oxyCODONE, 15 mg, Oral, Q4H PRN    prochlorperazine, 5 mg, Intravenous, Q6H PRN    senna-docusate, 2 tablet, Oral, Nightly PRN     Review of Systems  Objective:     Weight: 116.1 kg (255 lb 15.3 oz)  Body mass index is 32.86 kg/m².  Vital Signs (Most Recent):  Temp: 97.9 °F (36.6 °C) (05/21/25 1119)  Pulse: 85 (05/21/25 1119)  Resp: 18 (05/21/25 1119)  BP: (!) 147/69 (05/21/25 1119)  SpO2: 97 % (05/21/25 1119) Vital Signs (24h Range):  Temp:  [97.6 °F (36.4 °C)-98.2 °F (36.8 °C)] 97.9 °F (36.6 °C)  Pulse:  [67-91] 85  Resp:  [12-20] 18  SpO2:  [95 %-100 %] 97 %  BP: (115-179)/(69-88) 147/69                                  Closed/Suction Drain 05/15/25 1420 Tube - 1 Left Back Bulb 10 Fr. (Active)   Site Description Bleeding 05/15/25 1625   Dressing Type Other (Comment) 05/15/25 1625   Dressing Status New drainage 05/15/25 1625   Dressing Intervention First dressing 05/15/25 1625   Drainage Serosanguineous 05/15/25 1625   Status To bulb suction 05/15/25 1625   Output (mL) 40 mL 05/16/25 0710            Urethral Catheter 05/15/25 0945 Non-latex;Straight-tip 16 Fr. (Active)   Site Assessment Intact;Clean;Dry 05/15/25 2000   Collection Container Urimeter 05/15/25 2000   Securement Method secured to top of thigh w/ adhesive device 05/15/25 1625   Reason for Continuing Urinary Catheterization Post operative 05/15/25 1625   CAUTI Prevention Bundle Securement Device in place with 1" slack;Intact seal between catheter & drainage tubing;Drainage bag/urimeter off the floor;Sheeting clip in use;No dependent loops or kinks;Drainage " "bag/urimeter below bladder;Drainage bag/urimeter not overfilled (<2/3 full) 05/15/25 1625   Output (mL) 450 mL 05/16/25 0424       Neurosurgery Physical Exam    General: well developed, well nourished, no distress  Mental Status: Awake, Alert, Oriented X3.Thought content appropriate  GCS: Motor: 6/Verbal: 5/Eyes: 4 GCS Total: 15    Motor Strength:  Strength                                HF KF KE DF PF EHL   Lower: R 5/5 5/5 5/5 5/5 5/5 5/5    L 5/5 5/5 5/5 5/5 5/5 5/5       Incision- CDI        Significant Labs:  Recent Labs   Lab 05/19/25  1150   *      K 4.4      CO2 29   BUN 18   CREATININE 1.1   CALCIUM 9.0   MG 2.3     Recent Labs   Lab 05/19/25  1150   WBC 9.90   HGB 9.0*   HCT 27.8*        No results for input(s): "LABPT", "INR", "APTT" in the last 48 hours.  Microbiology Results (last 7 days)       ** No results found for the last 168 hours. **              Assessment/Plan:     Active Diagnoses:    Diagnosis Date Noted POA    PRINCIPAL PROBLEM:  Adjacent segment disease of lumbar spine with history of fusion procedure [M51.369, Z98.1] 04/08/2015 Not Applicable    Spinal stenosis of lumbar region with neurogenic claudication [M48.062] 05/15/2025 Yes    Type 2 diabetes mellitus, with long-term current use of insulin [E11.9, Z79.4] 05/15/2025 Not Applicable    CKD (chronic kidney disease), stage III [N18.30]  Yes     Chronic    Hypertriglyceridemia [E78.1] 10/05/2016 Yes    Type 2 diabetes mellitus with diabetic polyneuropathy, with long-term current use of insulin [E11.42, Z79.4] 10/04/2016 Not Applicable     Chronic    S/P liver transplant [Z94.4] 10/04/2016 Not Applicable     Chronic    Essential hypertension [I10] 06/19/2015 Yes    Acquired hypothyroidism [E03.9]  Yes     Chronic    Chronic pain syndrome [G89.4] 07/13/2011 Yes    Gout, unspecified [M10.9] 03/14/2011 Yes      Problems Resolved During this Admission:       A/P:  POD #6 left L4-5 OLIF and posterior " instrumentation     --Neurologically stable          -q4h neuro checks  --Imaging: Post op xrays pending  --Drains: Removed  --Pain control- toradol and valium ordered  --DVT ppx: TEDs/SCDs/SQH  --Activity: PT/OT, OOB. LSO brace  --Diet: Diabetic, Saline Lock IV  --Bowel regimen: PRN suppository, senna PRN  --Urinary: voiding  --Atelectasis ppx: Encourage IS hourly  --Appreciate HM recs     Dispo: Pending rehab/snf and imaging    All of the above discussed and reviewed with Dr. Payne.      GHISLAINE JoC  Neurosurgery  [facility]

## 2025-05-22 LAB
ANION GAP (OHS): 9 MMOL/L (ref 8–16)
BUN SERPL-MCNC: 34 MG/DL (ref 8–23)
CALCIUM SERPL-MCNC: 8.9 MG/DL (ref 8.7–10.5)
CHLORIDE SERPL-SCNC: 102 MMOL/L (ref 95–110)
CO2 SERPL-SCNC: 27 MMOL/L (ref 23–29)
CREAT SERPL-MCNC: 1.4 MG/DL (ref 0.5–1.4)
GFR SERPLBLD CREATININE-BSD FMLA CKD-EPI: 55 ML/MIN/1.73/M2
GLUCOSE SERPL-MCNC: 203 MG/DL (ref 70–110)
POCT GLUCOSE: 178 MG/DL (ref 70–110)
POCT GLUCOSE: 179 MG/DL (ref 70–110)
POCT GLUCOSE: 196 MG/DL (ref 70–110)
POTASSIUM SERPL-SCNC: 4.4 MMOL/L (ref 3.5–5.1)
SODIUM SERPL-SCNC: 138 MMOL/L (ref 136–145)

## 2025-05-22 PROCEDURE — 63600175 PHARM REV CODE 636 W HCPCS

## 2025-05-22 PROCEDURE — 97116 GAIT TRAINING THERAPY: CPT | Mod: CQ

## 2025-05-22 PROCEDURE — 99900035 HC TECH TIME PER 15 MIN (STAT)

## 2025-05-22 PROCEDURE — 25000003 PHARM REV CODE 250: Performed by: NURSE PRACTITIONER

## 2025-05-22 PROCEDURE — 80048 BASIC METABOLIC PNL TOTAL CA: CPT

## 2025-05-22 PROCEDURE — 11000001 HC ACUTE MED/SURG PRIVATE ROOM

## 2025-05-22 PROCEDURE — 94761 N-INVAS EAR/PLS OXIMETRY MLT: CPT

## 2025-05-22 PROCEDURE — 97530 THERAPEUTIC ACTIVITIES: CPT | Mod: CQ

## 2025-05-22 PROCEDURE — 36415 COLL VENOUS BLD VENIPUNCTURE: CPT

## 2025-05-22 PROCEDURE — 94799 UNLISTED PULMONARY SVC/PX: CPT | Mod: XB

## 2025-05-22 PROCEDURE — 97530 THERAPEUTIC ACTIVITIES: CPT

## 2025-05-22 PROCEDURE — 63600175 PHARM REV CODE 636 W HCPCS: Performed by: NEUROLOGICAL SURGERY

## 2025-05-22 PROCEDURE — 97535 SELF CARE MNGMENT TRAINING: CPT

## 2025-05-22 PROCEDURE — 25000003 PHARM REV CODE 250: Performed by: NEUROLOGICAL SURGERY

## 2025-05-22 RX ORDER — ASPIRIN 81 MG/1
81 TABLET ORAL DAILY
Status: DISCONTINUED | OUTPATIENT
Start: 2025-05-23 | End: 2025-05-23 | Stop reason: HOSPADM

## 2025-05-22 RX ORDER — INSULIN GLARGINE 100 [IU]/ML
20 INJECTION, SOLUTION SUBCUTANEOUS DAILY
Status: DISCONTINUED | OUTPATIENT
Start: 2025-05-23 | End: 2025-05-23 | Stop reason: HOSPADM

## 2025-05-22 RX ORDER — INSULIN GLARGINE 100 [IU]/ML
5 INJECTION, SOLUTION SUBCUTANEOUS ONCE
Status: COMPLETED | OUTPATIENT
Start: 2025-05-22 | End: 2025-05-22

## 2025-05-22 RX ORDER — HYDROMORPHONE HYDROCHLORIDE 2 MG/1
4 TABLET ORAL EVERY 4 HOURS PRN
Refills: 0 | Status: DISCONTINUED | OUTPATIENT
Start: 2025-05-22 | End: 2025-05-23 | Stop reason: HOSPADM

## 2025-05-22 RX ADMIN — GABAPENTIN 900 MG: 300 CAPSULE ORAL at 09:05

## 2025-05-22 RX ADMIN — ALUMINUM HYDROXIDE, MAGNESIUM HYDROXIDE, AND DIMETHICONE 30 ML: 200; 20; 200 SUSPENSION ORAL at 05:05

## 2025-05-22 RX ADMIN — SENNOSIDES AND DOCUSATE SODIUM 2 TABLET: 50; 8.6 TABLET ORAL at 09:05

## 2025-05-22 RX ADMIN — OXYCODONE 15 MG: 5 TABLET ORAL at 08:05

## 2025-05-22 RX ADMIN — HYDROMORPHONE HYDROCHLORIDE 4 MG: 2 TABLET ORAL at 09:05

## 2025-05-22 RX ADMIN — TRAZODONE HYDROCHLORIDE 50 MG: 50 TABLET ORAL at 09:05

## 2025-05-22 RX ADMIN — ALUMINUM HYDROXIDE, MAGNESIUM HYDROXIDE, AND DIMETHICONE 30 ML: 200; 20; 200 SUSPENSION ORAL at 09:05

## 2025-05-22 RX ADMIN — GABAPENTIN 900 MG: 300 CAPSULE ORAL at 02:05

## 2025-05-22 RX ADMIN — GABAPENTIN 900 MG: 300 CAPSULE ORAL at 08:05

## 2025-05-22 RX ADMIN — ATORVASTATIN CALCIUM 40 MG: 40 TABLET, FILM COATED ORAL at 08:05

## 2025-05-22 RX ADMIN — DIAZEPAM 5 MG: 5 TABLET ORAL at 09:05

## 2025-05-22 RX ADMIN — OXYCODONE 15 MG: 5 TABLET ORAL at 04:05

## 2025-05-22 RX ADMIN — ALLOPURINOL 100 MG: 100 TABLET ORAL at 09:05

## 2025-05-22 RX ADMIN — TACROLIMUS 1 MG: 1 CAPSULE ORAL at 05:05

## 2025-05-22 RX ADMIN — ACETAMINOPHEN 650 MG: 325 TABLET ORAL at 05:05

## 2025-05-22 RX ADMIN — ALUMINUM HYDROXIDE, MAGNESIUM HYDROXIDE, AND DIMETHICONE 30 ML: 200; 20; 200 SUSPENSION ORAL at 08:05

## 2025-05-22 RX ADMIN — METHOCARBAMOL TABLETS 1000 MG: 500 TABLET, COATED ORAL at 02:05

## 2025-05-22 RX ADMIN — DIAZEPAM 5 MG: 5 TABLET ORAL at 08:05

## 2025-05-22 RX ADMIN — HYDROMORPHONE HYDROCHLORIDE 4 MG: 2 TABLET ORAL at 10:05

## 2025-05-22 RX ADMIN — TACROLIMUS 2 MG: 1 CAPSULE ORAL at 08:05

## 2025-05-22 RX ADMIN — METHOCARBAMOL TABLETS 1000 MG: 500 TABLET, COATED ORAL at 09:05

## 2025-05-22 RX ADMIN — ACETAMINOPHEN 650 MG: 325 TABLET ORAL at 12:05

## 2025-05-22 RX ADMIN — METOPROLOL SUCCINATE 200 MG: 50 TABLET, EXTENDED RELEASE ORAL at 08:05

## 2025-05-22 RX ADMIN — HYDROMORPHONE HYDROCHLORIDE 4 MG: 2 TABLET ORAL at 06:05

## 2025-05-22 RX ADMIN — LISINOPRIL 20 MG: 20 TABLET ORAL at 08:05

## 2025-05-22 RX ADMIN — INSULIN GLARGINE 15 UNITS: 100 INJECTION, SOLUTION SUBCUTANEOUS at 08:05

## 2025-05-22 RX ADMIN — ALUMINUM HYDROXIDE, MAGNESIUM HYDROXIDE, AND DIMETHICONE 30 ML: 200; 20; 200 SUSPENSION ORAL at 12:05

## 2025-05-22 RX ADMIN — ACETAMINOPHEN 650 MG: 325 TABLET ORAL at 06:05

## 2025-05-22 RX ADMIN — ALLOPURINOL 100 MG: 100 TABLET ORAL at 08:05

## 2025-05-22 RX ADMIN — METHOCARBAMOL TABLETS 1000 MG: 500 TABLET, COATED ORAL at 08:05

## 2025-05-22 RX ADMIN — HEPARIN SODIUM 5000 UNITS: 5000 INJECTION INTRAVENOUS; SUBCUTANEOUS at 09:05

## 2025-05-22 RX ADMIN — INSULIN GLARGINE 5 UNITS: 100 INJECTION, SOLUTION SUBCUTANEOUS at 11:05

## 2025-05-22 RX ADMIN — LEVOTHYROXINE SODIUM 88 MCG: 88 TABLET ORAL at 08:05

## 2025-05-22 RX ADMIN — HYDROMORPHONE HYDROCHLORIDE 4 MG: 2 TABLET ORAL at 02:05

## 2025-05-22 RX ADMIN — INSULIN ASPART 2 UNITS: 100 INJECTION, SOLUTION INTRAVENOUS; SUBCUTANEOUS at 12:05

## 2025-05-22 NOTE — PLAN OF CARE
Problem: Occupational Therapy  Goal: Occupational Therapy Goal  Description: Goals to be met by: 6/16/25     Patient will increase functional independence with ADLs by performing:    LE Dressing with Stand-by Assistance using reacher.  Grooming while EOB with Set-up Assistance.  Toileting from toilet with Minimal Assistance for hygiene and clothing management.   Rolling to Bilateral with Stand-by Assistance.   Supine to sit with Minimal Assistance.Goal met 5/21/2025  Stand pivot transfers with Minimal Assistance.  Toilet transfer to bedside commode with Minimal Assistance.    Outcome: Progressing

## 2025-05-22 NOTE — ASSESSMENT & PLAN NOTE
Patient's blood pressure range in the last 24 hours was: BP  Min: 131/71  Max: 187/100.The patient's inpatient anti-hypertensive regimen is listed below:  Current Antihypertensives  metoprolol succinate (TOPROL-XL) 24 hr tablet 200 mg, Daily, Oral  lisinopriL tablet 20 mg, Daily, Oral  hydrALAZINE injection 10 mg, Every 8 hours PRN, Intravenous    Plan  - BP is controlled, no changes needed to their regimen

## 2025-05-22 NOTE — PT/OT/SLP PROGRESS
Occupational Therapy  Co-Treatment w/ PTA    Name: Vick Gonzalez  MRN: [id number]  Admitting Diagnosis:  Adjacent segment disease of lumbar spine with history of fusion procedure  7 Days Post-Op  Pre-op Diagnosis: Adjacent segment disease of lumbar spine with history of fusion procedure [M51.369, Z98.1] s/p Procedure(s):  ARTHRODESIS, SPINE, LUMBAR, OLIF, MINIMALLY INVASIVE  FACETECTOMY   The primary encounter diagnosis was Adjacent segment disease of lumbar spine with history of fusion procedure. Diagnoses of Stage 3 chronic kidney disease, unspecified whether stage 3a or 3b CKD, Spinal stenosis of lumbar region with neurogenic claudication, and Type 2 diabetes mellitus with diabetic polyneuropathy, with long-term current use of insulin were also pertinent to this visit.    Recommendations:     Discharge Recommendations: High Intensity Therapy  Discharge Equipment Recommendations:  to be determined by next level of care, bedside commode, bath bench  Barriers to discharge:  Decreased caregiver support, Inaccessible home environment (increased level of assist)    Assessment:     Vick Gonzalez is a 69 y.o. male with a medical diagnosis of Adjacent segment disease of lumbar spine with history of fusion procedure.  He presents with Performance deficits affecting function are weakness, impaired endurance, impaired self care skills, impaired functional mobility, gait instability, impaired balance, decreased upper extremity function, decreased lower extremity function, decreased safety awareness, pain, decreased ROM, impaired coordination, impaired skin, edema, orthopedic precautions.     Pt continues to make good progress. Pt progressed to performing BSC tf w/ Min A x 2 person assist w/ RW; toileting Max A x1 person (+)BM. Pt sat UIC 30 minutes due to L hip pain. Continue with OT POC.     Rehab Prognosis:  Good; patient would benefit from acute skilled OT services to address these deficits and reach maximum level of  function.       Plan:     Patient to be seen 5 x/week to address the above listed problems via self-care/home management, therapeutic activities, therapeutic exercises  Plan of Care Expires: 06/16/25  Plan of Care Reviewed with: patient, spouse    Subjective     Chief Complaint: L hip pain  Patient/Family Comments/goals: pt agreeable to OT. I want to walk.  Pain/Comfort:  Pain Rating 1: 9/10  Location - Side 1: Left  Location 1: hip  Pain Addressed 1: Reposition, Distraction, Nurse notified (nurse brought pain meds to pt on request)  Pain Rating Post-Intervention 1:  (not rated)    Objective:     Communicated with: nurse prior to session.  Patient found HOB elevated with bed alarm, peripheral IV upon OT entry to room.    General Precautions: Standard, diabetic, fall    Orthopedic Precautions:spinal precautions  Braces: LSO  Respiratory Status: Room air     Occupational Performance:     Bed Mobility:    Patient completed Rolling/Turning to Right with stand by assistance, with side rail, and vc for log roll tech  Patient completed Scooting/Bridging with stand by assistance  Patient completed Supine to Sit with stand by assistance, with side rail, and vc for log roll tech  Patient completed Sit to Supine with contact guard assistance and vc for log roll tech     Functional Mobility/Transfers:  Patient completed Sit <> Stand Transfer with minimum assistance, of 2 persons, and bed moderately elevated (8counts)  with  rolling walker and 2 trials; and 2 trials from BSC Min -Mod A w/ RW ; BSC elevated to appropriate height.  Patient completed Bed <> Chair Transfer using Step Transfer technique with minimum assistance and of 2 persons with rolling walker and cardiac chair w/ seat cushion  Patient completed Toilet Transfer Step Transfer technique with minimum assistance and of 2 persons with  rolling walker and bedside commode  Functional Mobility: ambulated 5ft Min A  2 person assist w/ RW to BS chair. No buckling of knees  noted.     Activities of Daily Living:  Grooming: pt declined, already did w/ spouse assisting   Upper Body Dressing: stand by assistance to don/doff LSO brace seated EOB  Toileting: maximal assistance for perirectal hygiene and clothes management seated on BSC. OT assisted w/ hygiene for thoroughness as pt was not fully clean..      [facility] 6 Click ADL: 17    Treatment & Education:  Pt seen for safe self care activities and fx mobility retraining as stated above.  Family ed/training w/ spouse. Ongoing.  All questions/concerns addressed within scope.  Call staff for BTB/OOB assist. Call bell use explained. Pt acknowledged.  Purpose of OT and POC  Impt of OOB activity and sitting UIC explained to pt to prevent negative effects of prolonged bed rest. Pt verbalized understanding and agreeable to sit UIC.   Pt's nurse Harika was instructed in how to transfer pt BTB from BS chair using gait belt and RW. She verbalized understanding.   Pt called to NS approx 30 minutes later wanting to get back in bed.   OT and PTA assist pt BTB. Min a w/ RW x 2 person assist for sit<>stand from cardiac chair, Min A w/ RW x 2  for stand step tf to bed.   Pt controlled his descent to bed w/  Min A x 2 for stand>sit.   Pt doffed LSO brace in sitting w/ SBA.   Pt needed minimal vc for adherence to spinal precautions for sit>supine and CGA for R leg. Pt did not adhere to log roll tech despite being verbally instructed when getting back in bed as he was eager to lie down due to L hip pain.     Patient left HOB elevated with all lines intact, call button in reach, bed alarm on, nurse notified, and wife present    GOALS:   Multidisciplinary Problems       Occupational Therapy Goals          Problem: Occupational Therapy    Goal Priority Disciplines Outcome Interventions   Occupational Therapy Goal     OT, PT/OT Progressing    Description: Goals to be met by: 6/16/25     Patient will increase functional independence with ADLs by performing:    VAUGHN  Dressing with Stand-by Assistance using reacher.  Grooming while EOB with Set-up Assistance.  Toileting from toilet with Minimal Assistance for hygiene and clothing management.   Rolling to Bilateral with Stand-by Assistance.   Supine to sit with Minimal Assistance.Goal met 5/21/2025  Stand pivot transfers with Minimal Assistance.  Toilet transfer to bedside commode with Minimal Assistance.                         DME Justifications:  No DME recommended requiring DME justifications    Time Tracking:     OT Date of Treatment: 05/22/25  OT Start Time: 1355 (1456)  OT Stop Time: 1443 (1506)  OT Total Time (min): 48 min+10=58 minutes    Billable Minutes:Self Care/Home Management 30  Therapeutic Activity 28  Total Time 58    OT/CASSANDRA: OT     Number of CASSANDRA visits since last OT visit: 0    5/22/2025

## 2025-05-22 NOTE — ASSESSMENT & PLAN NOTE
-Resume home insulin  -Accu-Cheks AC and HS    Patient's FSGs are uncontrolled due to hyperglycemia on current medication regimen.  Last A1c reviewed-   Lab Results   Component Value Date    HGBA1C 6.9 (H) 03/12/2025     Most recent fingerstick glucose reviewed-   Recent Labs   Lab 05/21/25  1552 05/21/25  2105 05/22/25  0618   POCTGLUCOSE 177* 210* 179*     Current correctional scale  Low  Increase anti-hyperglycemic dose as follows-   Antihyperglycemics (From admission, onward)      Start     Stop Route Frequency Ordered    05/23/25 0900  insulin glargine U-100 (Lantus) pen 20 Units         -- SubQ Daily 05/22/25 0942    05/19/25 2245  insulin aspart U-100 pen 0-5 Units         -- SubQ Before meals & nightly PRN 05/19/25 2145          Hold Oral hypoglycemics while patient is in the hospital.

## 2025-05-22 NOTE — PLAN OF CARE
Problem: Adult Inpatient Plan of Care  Goal: Plan of Care Review  Outcome: Progressing  Goal: Absence of Hospital-Acquired Illness or Injury  Outcome: Progressing     Problem: Diabetes Comorbidity  Goal: Blood Glucose Level Within Targeted Range  Outcome: Progressing     Problem: Wound  Goal: Optimal Coping  Outcome: Progressing  Goal: Improved Oral Intake  Outcome: Progressing  Goal: Optimal Pain Control and Function  Outcome: Progressing     Problem: Spinal Surgery  Goal: Optimal Coping with Surgery  Outcome: Progressing  Goal: Effective Bowel Elimination  Outcome: Progressing  Goal: Optimal Pain Control and Function  Outcome: Progressing     Problem: Fall Injury Risk  Goal: Absence of Fall and Fall-Related Injury  Outcome: Progressing     Problem: Comorbidity Management  Goal: Maintenance of Heart Failure Symptom Control  Outcome: Progressing     Pt safety maintained. Medication administered per MAR. Blood glucose monitored. Pt instructed to call w/any needs, verbalized understanding. Spouse at bedside. Bed in lowest position, locked and bed alarms on. Call light w/in pt's reach.

## 2025-05-22 NOTE — PLAN OF CARE
Problem: Physical Therapy  Goal: Physical Therapy Goal  Description: Goals to be met by: discharge date     Patient will increase functional independence with mobility by performin. Supine to sit with Modified [place]  2. Sit to supine with Modified [place]  3. Rolling to Left and Right with Modified [place].  4. Sit to stand transfer with Modified [place]  5. Bed to chair transfer with Stand-by Assistance using Rolling Walker  6. Gait  x 150 feet with Stand-by Assistance using Rolling Walker.   7.  Able to don/doff LSO with mod independence    Outcome: Progressing

## 2025-05-22 NOTE — NURSING
Received report from Marcos, received the patient lying supine with eyes closed and respirations even and unlabored at present. Bed locked in low with alarm on and call light in reach, family member at bedside.

## 2025-05-22 NOTE — PROGRESS NOTES
[facility] Medicine  Progress Note    Patient Name: Vick Gonzalez  MRN: [id number]  Patient Class: IP- Inpatient   Admission Date: 5/15/2025  Length of Stay: 7 days  Attending Physician: Khadar Payne MD  Primary Care Provider: Emanuel Lopez MD        Subjective     Principal Problem:Adjacent segment disease of lumbar spine with history of fusion procedure        HPI:  Vick Gonzalez is a chronically ill 70-year-old male admitted to [facility] today for adjacent segment disease of the lumbar spine.  The patient has a long complex history of chronic lower back pain and has developed neurogenic claudication.  He is followed in outpatient setting by Dr. Khadar Payne.  On today, the patient underwent Left L4-5 oblique interbody fusion, placement of interbody spacer, DePuy Conduit LLIF spacer filled with allograft BMP and DBM, L4-S1 posterior segmental instrumentation using DePuy Viper Prime system, and  L4-5 laminectomy, medial facetectomy, foraminotomy.  Due to the severity of the patient's underlying comorbidities, Hospital Medicine was consulted for medical management.    Overview/Hospital Course:  No notes on file    Interval History:  Doing well.  Insulin regimen increased for hyperglycemia.  We will continue to follow along with primary team.  We will continue to monitor blood sugar.  Anticipate discharge in a.m. to SNF     Review of Systems   Constitutional:  Positive for activity change and fever.   HENT:  Negative for trouble swallowing.    Musculoskeletal:  Positive for arthralgias, back pain, gait problem, myalgias and neck pain.   Neurological:  Positive for numbness (Chronic).   Psychiatric/Behavioral:  Negative for confusion.      Objective:     Vital Signs (Most Recent):  Temp: 98 °F (36.7 °C) (05/22/25 1109)  Pulse: 82 (05/22/25 1109)  Resp: 18 (05/22/25 1411)  BP: (!) 170/50 (05/22/25 1109)  SpO2: 100 % (05/22/25 1109) Vital Signs (24h Range):  Temp:  [97.4 °F (36.3 °C)-98  °F (36.7 °C)] 98 °F (36.7 °C)  Pulse:  [78-88] 82  Resp:  [17-24] 18  SpO2:  [96 %-100 %] 100 %  BP: (131-187)/() 170/50     Weight: 116.1 kg (255 lb 15.3 oz)  Body mass index is 32.86 kg/m².    Intake/Output Summary (Last 24 hours) at 5/22/2025 1424  Last data filed at 5/22/2025 1309  Gross per 24 hour   Intake 720 ml   Output 740 ml   Net -20 ml         Physical Exam  Vitals and nursing note reviewed.   Constitutional:       Appearance: He is ill-appearing.   HENT:      Mouth/Throat:      Mouth: Mucous membranes are moist.   Eyes:      Extraocular Movements: Extraocular movements intact.   Cardiovascular:      Rate and Rhythm: Normal rate and regular rhythm.   Pulmonary:      Effort: Pulmonary effort is normal.   Neurological:      Mental Status: He is alert.      Motor: Weakness present.               Significant Labs: All pertinent labs within the past 24 hours have been reviewed.    Significant Imaging: I have reviewed all pertinent imaging results/findings within the past 24 hours.      Assessment & Plan  Adjacent segment disease of lumbar spine with history of fusion procedure  -Defer to neurosurgery to manage    Chronic pain syndrome  -Analgesic therapy as needed    Acquired hypothyroidism  -resume levothyroxine 88 mcg daily    Gout, unspecified  -Resume allopurinol    Essential hypertension  Patient's blood pressure range in the last 24 hours was: BP  Min: 131/71  Max: 187/100.The patient's inpatient anti-hypertensive regimen is listed below:  Current Antihypertensives  metoprolol succinate (TOPROL-XL) 24 hr tablet 200 mg, Daily, Oral  lisinopriL tablet 20 mg, Daily, Oral  hydrALAZINE injection 10 mg, Every 8 hours PRN, Intravenous    Plan  - BP is controlled, no changes needed to their regimen  Type 2 diabetes mellitus with diabetic polyneuropathy, with long-term current use of insulin  -Resume home insulin  -Accu-Cheks AC and HS    Patient's FSGs are uncontrolled due to hyperglycemia on current  medication regimen.  Last A1c reviewed-   Lab Results   Component Value Date    HGBA1C 6.9 (H) 03/12/2025     Most recent fingerstick glucose reviewed-   Recent Labs   Lab 05/21/25  1552 05/21/25  2105 05/22/25  0618   POCTGLUCOSE 177* 210* 179*     Current correctional scale  Low  Increase anti-hyperglycemic dose as follows-   Antihyperglycemics (From admission, onward)      Start     Stop Route Frequency Ordered    05/23/25 0900  insulin glargine U-100 (Lantus) pen 20 Units         -- SubQ Daily 05/22/25 0942    05/19/25 2245  insulin aspart U-100 pen 0-5 Units         -- SubQ Before meals & nightly PRN 05/19/25 2145          Hold Oral hypoglycemics while patient is in the hospital.   S/P liver transplant  -Resume Prograf    Hypertriglyceridemia  -Resume home medications    CKD (chronic kidney disease), stage III  Creatine stable for now. BMP reviewed- noted Estimated Creatinine Clearance: 69.4 mL/min (based on SCr of 1.4 mg/dL). according to latest data. Based on current GFR, CKD stage is stage 3 - GFR 30-59.  Monitor UOP and serial BMP and adjust therapy as needed. Renally dose meds. Avoid nephrotoxic medications and procedures.  Spinal stenosis of lumbar region with neurogenic claudication  Per primary team    Type 2 diabetes mellitus, with long-term current use of insulin  Holding home meds  We will order insulin siding scale here in the hospital      VTE Risk Mitigation (From admission, onward)           Ordered     heparin (porcine) injection 5,000 Units  Every 12 hours         05/15/25 1625     IP VTE HIGH RISK PATIENT  Once         05/15/25 1625     Place sequential compression device  Until discontinued         05/15/25 1625                    Discharge Planning   JOJO: 5/22/2025     Code Status: Full Code   Medical Readiness for Discharge Date:   Discharge Plan A: Skilled Nursing Facility ([facility])                Please place Justification for DME        Gilson Silva PA-C  Department of  [facility]

## 2025-05-22 NOTE — ASSESSMENT & PLAN NOTE
Creatine stable for now. BMP reviewed- noted Estimated Creatinine Clearance: 69.4 mL/min (based on SCr of 1.4 mg/dL). according to latest data. Based on current GFR, CKD stage is stage 3 - GFR 30-59.  Monitor UOP and serial BMP and adjust therapy as needed. Renally dose meds. Avoid nephrotoxic medications and procedures.

## 2025-05-22 NOTE — PLAN OF CARE
Medicare Message     Important Message from Medicare regarding Discharge Appeal Rights Signed/date by patient/caregiver Given to patient/caregiver; Explained to patient/caregiver; Signed/date by patient/caregiver Given to patient/caregiver; Explained to patient/caregiver; Signed/date by patient/caregiver -- Other (comments)Important Message from Medicare regarding Discharge Appeal Rights. Other (comments). Has comment. Taken on 5/22/25 1623   Date IMM was signed -- -- -- -- 5/22/2025   Time IMM was signed -- -- -- -- 1119   CHOU Message     Medicare Outpatient and Observation Notification regarding financial responsibility -- -- -- Given to patient/caregiver; Explained to patient/caregiver; Signed/date by patient/caregiver --   Date CHOU was signed -- -- -- 2/27/2017 --   Time CHOU was signed -- -- -- 2256 --

## 2025-05-22 NOTE — PT/OT/SLP PROGRESS
Physical Therapy Treatment    Patient Name:  Vick Gonzalez   MRN:  [id number]    Recommendations:     Discharge Recommendations: High Intensity Therapy  Discharge Equipment Recommendations: to be determined by next level of care, walker, rolling  Barriers to discharge: decreased mobility,strength and endurance    Assessment:     Vick Gonzalez is a 69 y.o. male admitted with a medical diagnosis of Adjacent segment disease of lumbar spine with history of fusion procedure.  He presents with the following impairments/functional limitations: weakness, impaired endurance, impaired functional mobility, gait instability, impaired balance, decreased lower extremity function, pain, decreased ROM, impaired coordination, impaired skin, impaired joint extensibility,pt with good participation and improving status,pt will benefit from high intensity therapy upon discharge.    Rehab Prognosis: Good; patient would benefit from acute skilled PT services to address these deficits and reach maximum level of function.    Recent Surgery: Procedure(s) (LRB):  ARTHRODESIS, SPINE, LUMBAR, OLIF, MINIMALLY INVASIVE (N/A)  FACETECTOMY (N/A) 7 Days Post-Op    Plan:     During this hospitalization, patient to be seen daily to address the identified rehab impairments via gait training, therapeutic activities, therapeutic exercises, neuromuscular re-education and progress toward the following goals:    Plan of Care Expires:  06/16/25    Subjective     Chief Complaint: n/a  Patient/Family Comments/goals: pt agreeable to rx.  Pain/Comfort:  Pain Rating 1:  (no rating)  Location - Side 1: Left  Location 1: hip  Pain Addressed 1: Reposition, Distraction, Nurse notified (medication issued)      Objective:     Communicated with nsg prior to session.  Patient found supine with peripheral IV upon PT entry to room.     General Precautions: Standard, diabetic, fall  Orthopedic Precautions: spinal precautions  Braces: LSO  Respiratory Status: Room air      Functional Mobility:  Bed Mobility:     Supine to Sit: stand by assistance  Transfers:     Sit to Stand:  minimum assistance, of 2 persons, and X 2 trials with rolling walker  Bed to Chair: minimum assistance and of 2 persons with  rolling walker  using  ambulation  Toilet Transfer: minimum assistance, of 2 persons, and X 2 trials with  rolling walker  using  Step Transfer  Gait: amb ~5' with RW Min A X 2 LSO donned  Balance: fair standing balance with RW      AM-PAC 6 CLICK MOBILITY  Turning over in bed (including adjusting bedclothes, sheets and blankets)?: 3  Sitting down on and standing up from a chair with arms (e.g., wheelchair, bedside commode, etc.): 2  Moving from lying on back to sitting on the side of the bed?: 3  Moving to and from a bed to a chair (including a wheelchair)?: 2  Need to walk in hospital room?: 2  Climbing 3-5 steps with a railing?: 1  Basic Mobility Total Score: 13       Treatment & Education: spoke with nsg concerning t/f back to bed,pt sat up ~ 30 mins then back to bed from cardiac chair with RW and Min A X 2,pt used b/s commode and assisted by OT for cleaning up,sit-supine SBA,pt donned brace with SBA post set up.      Patient left supine with call button in reach, bed alarm on, and spouse present..    GOALS:   Multidisciplinary Problems       Physical Therapy Goals          Problem: Physical Therapy    Goal Priority Disciplines Outcome Interventions   Physical Therapy Goal     PT, PT/OT Progressing    Description: Goals to be met by: discharge date     Patient will increase functional independence with mobility by performin. Supine to sit with Modified [place]  2. Sit to supine with Modified [place]  3. Rolling to Left and Right with Modified [place].  4. Sit to stand transfer with Modified [place]  5. Bed to chair transfer with Stand-by Assistance using Rolling Walker  6. Gait  x 150 feet with Stand-by Assistance using Rolling Walker.   7.  Able to  don/doff LSO with mod independence                         DME Justifications:  No DME recommended requiring DME justifications    Time Tracking:     PT Received On: 05/22/25  PT Start Time: 1355     PT Stop Time: 1443  PT Total Time (min): 48 min     Billable Minutes: Gait Training 10 and Therapeutic Activity 38    Treatment Type: Treatment  PT/PTA: PTA     Number of PTA visits since last PT visit: 2     05/22/2025

## 2025-05-22 NOTE — PLAN OF CARE
Pt was accepted by [facility] swing bed for SNF. Pt and family agreeable to discharge to facility. Not medically ready for dc today due to DM management. Plans are to dc tomorrow. CM will continue to follow pt and provide any dc needs.     Future Appointments   Date Time Provider Department Center   5/30/2025  1:00 PM [facility] ODC XR-B LIMIT 500 LBS [facility] XRAY OP [facility]   5/30/2025  2:00 PM Paige Gilbert, PAUrvashiC [facility] NEUROSU [facility]   6/17/2025  8:30 AM LAB, [facility] LAB [place]   6/23/2025  4:00 PM Emanuel Lopez MD [facility] FAM MED [facility]   6/30/2025  9:00 AM LAB, [facility] LAB [place]   7/1/2025  9:45 AM [facility] OIC-XRAY [facility]   7/1/2025 10:30 AM Oralia Rowland, NP [facility] NEUROS8 [facility]   7/24/2025  3:20 PM Paddy Segundo OD [facility] OPTOMTY [facility]   8/19/2025  9:00 AM [facility] ORTHO CLINIC LIMIT 350LBS [facility] ORTHXR [facility]   8/19/2025 10:00 AM Khadar Payne MD [facility] NEUROSU [facility]      05/22/25 1437   Rounds   Attendance Nurse    Discharge Plan A Skilled Nursing Facility  ([facility])   Why the patient remains in the hospital Requires continued medical care   Transition of Care Barriers None

## 2025-05-22 NOTE — SUBJECTIVE & OBJECTIVE
Interval History:  Doing well.  Insulin regimen increased for hyperglycemia.  We will continue to follow along with primary team.  We will continue to monitor blood sugar.  Anticipate discharge in a.m. to [facility]     Review of Systems   Constitutional:  Positive for activity change and fever.   HENT:  Negative for trouble swallowing.    Musculoskeletal:  Positive for arthralgias, back pain, gait problem, myalgias and neck pain.   Neurological:  Positive for numbness (Chronic).   Psychiatric/Behavioral:  Negative for confusion.      Objective:     Vital Signs (Most Recent):  Temp: 98 °F (36.7 °C) (05/22/25 1109)  Pulse: 82 (05/22/25 1109)  Resp: 18 (05/22/25 1411)  BP: (!) 170/50 (05/22/25 1109)  SpO2: 100 % (05/22/25 1109) Vital Signs (24h Range):  Temp:  [97.4 °F (36.3 °C)-98 °F (36.7 °C)] 98 °F (36.7 °C)  Pulse:  [78-88] 82  Resp:  [17-24] 18  SpO2:  [96 %-100 %] 100 %  BP: (131-187)/() 170/50     Weight: 116.1 kg (255 lb 15.3 oz)  Body mass index is 32.86 kg/m².    Intake/Output Summary (Last 24 hours) at 5/22/2025 1424  Last data filed at 5/22/2025 1309  Gross per 24 hour   Intake 720 ml   Output 740 ml   Net -20 ml         Physical Exam  Vitals and nursing note reviewed.   Constitutional:       Appearance: He is ill-appearing.   HENT:      Mouth/Throat:      Mouth: Mucous membranes are moist.   Eyes:      Extraocular Movements: Extraocular movements intact.   Cardiovascular:      Rate and Rhythm: Normal rate and regular rhythm.   Pulmonary:      Effort: Pulmonary effort is normal.   Neurological:      Mental Status: He is alert.      Motor: Weakness present.               Significant Labs: All pertinent labs within the past 24 hours have been reviewed.    Significant Imaging: I have reviewed all pertinent imaging results/findings within the past 24 hours.

## 2025-05-22 NOTE — NURSING
Patient moaning in pain reports pain 10/10 with increased bp and agitation, prn and scheduled  meds given, Dr. Payne notified.

## 2025-05-23 VITALS
OXYGEN SATURATION: 98 % | HEART RATE: 82 BPM | RESPIRATION RATE: 19 BRPM | TEMPERATURE: 100 F | HEIGHT: 74 IN | WEIGHT: 255.94 LBS | SYSTOLIC BLOOD PRESSURE: 164 MMHG | DIASTOLIC BLOOD PRESSURE: 88 MMHG | BODY MASS INDEX: 32.85 KG/M2

## 2025-05-23 LAB
ABSOLUTE EOSINOPHIL (OHS): 0.7 K/UL
ABSOLUTE MONOCYTE (OHS): 1.05 K/UL (ref 0.3–1)
ABSOLUTE NEUTROPHIL COUNT (OHS): 6.35 K/UL (ref 1.8–7.7)
ANION GAP (OHS): 7 MMOL/L (ref 8–16)
BASOPHILS # BLD AUTO: 0.03 K/UL
BASOPHILS NFR BLD AUTO: 0.3 %
BUN SERPL-MCNC: 25 MG/DL (ref 8–23)
CALCIUM SERPL-MCNC: 9 MG/DL (ref 8.7–10.5)
CHLORIDE SERPL-SCNC: 103 MMOL/L (ref 95–110)
CO2 SERPL-SCNC: 26 MMOL/L (ref 23–29)
CREAT SERPL-MCNC: 1.1 MG/DL (ref 0.5–1.4)
ERYTHROCYTE [DISTWIDTH] IN BLOOD BY AUTOMATED COUNT: 13.4 % (ref 11.5–14.5)
GFR SERPLBLD CREATININE-BSD FMLA CKD-EPI: >60 ML/MIN/1.73/M2
GLUCOSE SERPL-MCNC: 199 MG/DL (ref 70–110)
HCT VFR BLD AUTO: 28.3 % (ref 40–54)
HGB BLD-MCNC: 9.1 GM/DL (ref 14–18)
IMM GRANULOCYTES # BLD AUTO: 0.1 K/UL (ref 0–0.04)
IMM GRANULOCYTES NFR BLD AUTO: 0.9 % (ref 0–0.5)
LYMPHOCYTES # BLD AUTO: 2.56 K/UL (ref 1–4.8)
MCH RBC QN AUTO: 28.5 PG (ref 27–31)
MCHC RBC AUTO-ENTMCNC: 32.2 G/DL (ref 32–36)
MCV RBC AUTO: 89 FL (ref 82–98)
NUCLEATED RBC (/100WBC) (OHS): 0 /100 WBC
PLATELET # BLD AUTO: 271 K/UL (ref 150–450)
PLATELET BLD QL SMEAR: NORMAL
PMV BLD AUTO: 11.5 FL (ref 9.2–12.9)
POCT GLUCOSE: 164 MG/DL (ref 70–110)
POCT GLUCOSE: 173 MG/DL (ref 70–110)
POCT GLUCOSE: 216 MG/DL (ref 70–110)
POTASSIUM SERPL-SCNC: 4.6 MMOL/L (ref 3.5–5.1)
RBC # BLD AUTO: 3.19 M/UL (ref 4.6–6.2)
RELATIVE EOSINOPHIL (OHS): 6.5 %
RELATIVE LYMPHOCYTE (OHS): 23.7 % (ref 18–48)
RELATIVE MONOCYTE (OHS): 9.7 % (ref 4–15)
RELATIVE NEUTROPHIL (OHS): 58.9 % (ref 38–73)
SODIUM SERPL-SCNC: 136 MMOL/L (ref 136–145)
WBC # BLD AUTO: 10.79 K/UL (ref 3.9–12.7)

## 2025-05-23 PROCEDURE — 25000003 PHARM REV CODE 250: Performed by: NEUROLOGICAL SURGERY

## 2025-05-23 PROCEDURE — 80048 BASIC METABOLIC PNL TOTAL CA: CPT

## 2025-05-23 PROCEDURE — 25000003 PHARM REV CODE 250: Performed by: NURSE PRACTITIONER

## 2025-05-23 PROCEDURE — 97530 THERAPEUTIC ACTIVITIES: CPT | Mod: CQ

## 2025-05-23 PROCEDURE — 97530 THERAPEUTIC ACTIVITIES: CPT

## 2025-05-23 PROCEDURE — 97116 GAIT TRAINING THERAPY: CPT | Mod: CQ

## 2025-05-23 PROCEDURE — 63600175 PHARM REV CODE 636 W HCPCS: Performed by: NEUROLOGICAL SURGERY

## 2025-05-23 PROCEDURE — 97535 SELF CARE MNGMENT TRAINING: CPT

## 2025-05-23 PROCEDURE — 36415 COLL VENOUS BLD VENIPUNCTURE: CPT

## 2025-05-23 PROCEDURE — 85025 COMPLETE CBC W/AUTO DIFF WBC: CPT | Performed by: NEUROLOGICAL SURGERY

## 2025-05-23 RX ORDER — OXYCODONE AND ACETAMINOPHEN 10; 325 MG/1; MG/1
1 TABLET ORAL EVERY 8 HOURS PRN
Qty: 12 TABLET | Refills: 0 | Status: CANCELLED | OUTPATIENT
Start: 2025-05-23

## 2025-05-23 RX ORDER — KETOROLAC TROMETHAMINE 10 MG/1
10 TABLET, FILM COATED ORAL ONCE
Status: COMPLETED | OUTPATIENT
Start: 2025-05-23 | End: 2025-05-23

## 2025-05-23 RX ORDER — HYDROMORPHONE HYDROCHLORIDE 4 MG/1
4 TABLET ORAL EVERY 6 HOURS PRN
Qty: 56 TABLET | Refills: 0 | Status: SHIPPED | OUTPATIENT
Start: 2025-05-23 | End: 2025-06-06

## 2025-05-23 RX ORDER — KETOROLAC TROMETHAMINE 30 MG/ML
30 INJECTION, SOLUTION INTRAMUSCULAR; INTRAVENOUS ONCE
Status: DISCONTINUED | OUTPATIENT
Start: 2025-05-23 | End: 2025-05-23

## 2025-05-23 RX ADMIN — TACROLIMUS 2 MG: 1 CAPSULE ORAL at 09:05

## 2025-05-23 RX ADMIN — HYDROMORPHONE HYDROCHLORIDE 4 MG: 2 TABLET ORAL at 10:05

## 2025-05-23 RX ADMIN — ALUMINUM HYDROXIDE, MAGNESIUM HYDROXIDE, AND DIMETHICONE 30 ML: 200; 20; 200 SUSPENSION ORAL at 06:05

## 2025-05-23 RX ADMIN — LEVOTHYROXINE SODIUM 88 MCG: 88 TABLET ORAL at 09:05

## 2025-05-23 RX ADMIN — ALLOPURINOL 100 MG: 100 TABLET ORAL at 09:05

## 2025-05-23 RX ADMIN — GABAPENTIN 900 MG: 300 CAPSULE ORAL at 09:05

## 2025-05-23 RX ADMIN — ASPIRIN 81 MG: 81 TABLET, COATED ORAL at 09:05

## 2025-05-23 RX ADMIN — METOPROLOL SUCCINATE 200 MG: 50 TABLET, EXTENDED RELEASE ORAL at 09:05

## 2025-05-23 RX ADMIN — HEPARIN SODIUM 5000 UNITS: 5000 INJECTION INTRAVENOUS; SUBCUTANEOUS at 09:05

## 2025-05-23 RX ADMIN — ACETAMINOPHEN 650 MG: 325 TABLET ORAL at 12:05

## 2025-05-23 RX ADMIN — LISINOPRIL 20 MG: 20 TABLET ORAL at 09:05

## 2025-05-23 RX ADMIN — ATORVASTATIN CALCIUM 40 MG: 40 TABLET, FILM COATED ORAL at 09:05

## 2025-05-23 RX ADMIN — KETOROLAC TROMETHAMINE 10 MG: 10 TABLET, FILM COATED ORAL at 01:05

## 2025-05-23 RX ADMIN — METHOCARBAMOL TABLETS 1000 MG: 500 TABLET, COATED ORAL at 09:05

## 2025-05-23 RX ADMIN — INSULIN GLARGINE 20 UNITS: 100 INJECTION, SOLUTION SUBCUTANEOUS at 09:05

## 2025-05-23 RX ADMIN — HYDROMORPHONE HYDROCHLORIDE 4 MG: 2 TABLET ORAL at 04:05

## 2025-05-23 RX ADMIN — ACETAMINOPHEN 650 MG: 325 TABLET ORAL at 06:05

## 2025-05-23 NOTE — ASSESSMENT & PLAN NOTE
-Resume home insulin  -Accu-Cheks AC and HS    Patient's FSGs are uncontrolled due to hyperglycemia on current medication regimen.  Last A1c reviewed-   Lab Results   Component Value Date    HGBA1C 6.9 (H) 03/12/2025     Most recent fingerstick glucose reviewed-   Recent Labs   Lab 05/22/25  1107 05/22/25  1534 05/22/25  2041 05/23/25  0547   POCTGLUCOSE 216* 196* 178* 173*       Current correctional scale  Low  Increase anti-hyperglycemic dose as follows-   Antihyperglycemics (From admission, onward)    Start     Stop Route Frequency Ordered    05/23/25 0900  insulin glargine U-100 (Lantus) pen 20 Units         -- SubQ Daily 05/22/25 0942    05/19/25 2245  insulin aspart U-100 pen 0-5 Units         -- SubQ Before meals & nightly PRN 05/19/25 2145        Hold Oral hypoglycemics while patient is in the hospital.

## 2025-05-23 NOTE — NURSING
Patient complaining on 10/10 pain. PO dilaudid given 2 hours ago. MD notified. Advised to give Toradol.

## 2025-05-23 NOTE — ASSESSMENT & PLAN NOTE
Patient's blood pressure range in the last 24 hours was: BP  Min: 142/69  Max: 185/93.The patient's inpatient anti-hypertensive regimen is listed below:  Current Antihypertensives  metoprolol succinate (TOPROL-XL) 24 hr tablet 200 mg, Daily, Oral  lisinopriL tablet 20 mg, Daily, Oral  hydrALAZINE injection 10 mg, Every 8 hours PRN, Intravenous    Plan  - BP is controlled, no changes needed to their regimen

## 2025-05-23 NOTE — PLAN OF CARE
Pt is agreeable with discharge to [facility] for SNF. Follow up appointment information added for AVS. Report info given to floor nurse to call. Pt will be transported by hospital WC Van with Ms Medrano.     Future Appointments   Date Time Provider Department Center   5/30/2025  1:00 PM [facility] ODC XR-B LIMIT 500 LBS [facility] XRAY OP [facility]   5/30/2025  2:00 PM Paige Gilbert, PA-C [facility] NEUROSU [facility]   6/17/2025  8:30 AM LAB, [facility] [place]   6/23/2025  4:00 PM Emanuel Lopez MD [facility] FAM MED [place] [facility]   6/30/2025  9:00 AM LAB, [facility] [place]   7/1/2025  9:45 AM [facility] OIC-XRAY N[facility] XRAY [facility]   7/1/2025 10:30 AM Oralia Rowland, NP [facility] NEUROS8 [facility]   7/24/2025  3:20 PM Paddy Segundo, OD [facility] OPTOMTY [facility]   8/19/2025  9:00 AM [facility] ORTHO CLINIC LIMIT 350LBS [facility] ORTHXR [facility]   8/19/2025 10:00 AM Khadar Payne MD [facility] NEUROSU [place] [facility]        05/23/25 1506   Final Note   Assessment Type Final Discharge Note   Anticipated Discharge Disposition SNF  ([facility])   What phone number can be called within the next 1-3 days to see how you are doing after discharge? [phone]   Hospital Resources/Appts/Education Provided Appointments scheduled and added to AVS   Any social issues identified prior to discharge? No   Hospital Follow Up  Appt(s) scheduled? Yes   Did you assess the readiness or willingness of the family or caregiver to support self management of care? Yes   Post-Acute Status   Post-Acute Authorization Placement   Post-Acute Placement Status Set-up Complete/Auth obtained   Discharge Delays None known at this time

## 2025-05-23 NOTE — PT/OT/SLP PROGRESS
Occupational Therapy   Treatment    Name: Vick Gonzalez  MRN: [id number]  Admitting Diagnosis:  Adjacent segment disease of lumbar spine with history of fusion procedure  8 Days Post-Op  Pre-op Diagnosis: Adjacent segment disease of lumbar spine with history of fusion procedure [M51.369, Z98.1] s/p Procedure(s):  ARTHRODESIS, SPINE, LUMBAR, OLIF, MINIMALLY INVASIVE  FACETECTOMY     Recommendations:     Discharge Recommendations: High Intensity Therapy  Discharge Equipment Recommendations:  to be determined by next level of care, bedside commode, bath bench  Barriers to discharge:  Decreased caregiver support, Inaccessible home environment (increased level of assist)    Assessment:     Vick Gonzalez is a 69 y.o. male with a medical diagnosis of Adjacent segment disease of lumbar spine with history of fusion procedure.  He presents with  Performance deficits affecting function are weakness, impaired endurance, impaired self care skills, impaired functional mobility, gait instability, impaired balance, decreased upper extremity function, decreased lower extremity function, decreased safety awareness, pain, decreased ROM, impaired coordination, impaired skin, edema, orthopedic precautions.     Pt met 1/7 goals by discharge. Pt progressed to Max A LE dressing  w/ RW, BSC tf Min A x 2 w/ RW, toileting Max A x 1 w/ RW, fx mobility Min A x 1 w/ RW and CGA of another for safety x 15 ft.     Rehab Prognosis:  Good; patient would benefit from acute skilled OT services to address these deficits and reach maximum level of function.       Plan:     Patient to be seen 5 x/week to address the above listed problems via self-care/home management, therapeutic activities, therapeutic exercises  Plan of Care Expires: 06/16/25  Plan of Care Reviewed with: patient    Subjective     Chief Complaint: L posterior hip pain post treatment.   Patient/Family Comments/goals: pt agreeable to OT. I need to use the toilet.  Pain/Comfort:  Pain  Rating 1: 0/10  Location - Side 1: Left  Location - Orientation 1: posterior  Location 1: hip  Pain Addressed 1: Reposition, Distraction, Cessation of Activity  Pain Rating Post-Intervention 1:  (not rated)    Objective:     Communicated with: nurse prior to session.  Patient found sitting edge of bed with peripheral IV (LSO brace on) upon OT entry to room.    General Precautions: Standard, fall, diabetic    Orthopedic Precautions:spinal precautions  Braces: LSO  Respiratory Status: Room air       Functional Mobility/Transfers:  Patient completed Sit <> Stand Transfer with minimum assistance and of 1-2  persons  with  rolling walker   Patient completed Bed <> Chair Transfer using Step Transfer technique with minimum assistance and of 2 persons with rolling walker  Patient completed Toilet Transfer Step Transfer technique with minimum assistance and of 2 persons with  rolling walker  Functional Mobility: ambulated CGA-Min A x 2 w/ RW 15ft to BSC, then approx 5ft to BS chair. Knees soft/slightly fatigued at end of walk, no buckling. 2 person assist for safety.    Activities of Daily Living:  Lower Body Dressing: maximal assistance to don underwear and pants w./ RW  Toileting: maximal assistance for clothes management in stance w/ RW, seated perirectal hygiene SBA, 1 courtesy wipe to check for thoroughness seated on BSC.      [facility] 6 Click ADL: 17    Treatment & Education:  Pt seen for safe self care  activities and fx mobility retraining as stated above.  All questions/concerns addressed within scope.  Call don't fall. Pt  acknowledged.      Patient left up in chair with all lines intact and call button in reach    GOALS:   Multidisciplinary Problems       Occupational Therapy Goals          Problem: Occupational Therapy    Goal Priority Disciplines Outcome Interventions   Occupational Therapy Goal     OT, PT/OT Adequate for Care Transition    Description: Goals to be met by: 6/16/25     Patient will increase  functional independence with ADLs by performing:    LE Dressing with Stand-by Assistance using reacher.  Grooming while EOB with Set-up Assistance.  Toileting from toilet with Minimal Assistance for hygiene and clothing management.   Rolling to Bilateral with Stand-by Assistance.   Supine to sit with Minimal Assistance.Goal met 5/21/2025  Stand pivot transfers with Minimal Assistance.  Toilet transfer to bedside commode with Minimal Assistance.                         DME Justifications:  No DME recommended requiring DME justifications    Time Tracking:     OT Date of Treatment: 05/23/25  OT Start Time: 1535  OT Stop Time: 1603  OT Total Time (min): 28 min    Billable Minutes:Self Care/Home Management 14  Therapeutic Activity 14  Total Time 28    OT/CASSANDRA: OT     Number of CASSANDRA visits since last OT visit: 0    5/23/2025

## 2025-05-23 NOTE — PLAN OF CARE
[facility]    FACILITY TRANSFER ORDERS      Patient Name: Vick Gonzalez  YOB: 1955    PCP: Emanuel Lopez MD   PCP Address: [address] / [place] [address]  PCP Phone Number: [phone]  PCP Fax: [phone]    Encounter Date: 05/22/2025    Admit to: SNF at [facility] for PT-OT 3-5 times a week 05/23/2025     Vital Signs:  Routine    Diagnoses:   Active Hospital Problems    Diagnosis  POA    *Adjacent segment disease of lumbar spine with history of fusion procedure [M51.369, Z98.1]  Not Applicable     -S/P Left L4-5 oblique interbody fusion, placement of interbody spacer, DePuy Conduit LLIF spacer filled with allograft BMP and DBM, L4-S1 posterior segmental instrumentation using DePuy Viper Prime system,L4-5 laminectomy, medial facetectomy, foraminotomy.      Spinal stenosis of lumbar region with neurogenic claudication [M48.062]  Yes    Type 2 diabetes mellitus, with long-term current use of insulin [E11.9, Z79.4]  Not Applicable    CKD (chronic kidney disease), stage III [N18.30]  Yes     Chronic    Hypertriglyceridemia [E78.1]  Yes    Type 2 diabetes mellitus with diabetic polyneuropathy, with long-term current use of insulin [E11.42, Z79.4]  Not Applicable     Chronic    S/P liver transplant [Z94.4]  Not Applicable     Chronic    Essential hypertension [I10]  Yes    Acquired hypothyroidism [E03.9]  Yes     Chronic    Chronic pain syndrome [G89.4]  Yes    Gout, unspecified [M10.9]  Yes      Resolved Hospital Problems   No resolved problems to display.       Allergies:Review of patient's allergies indicates:  No Known Allergies    Diet: diabetic diet: 1800 calorie    Activities: Activity as tolerated  Remain active with walking and other light activities daily.  No heavy lifting, no extreme bending or twisting.   Wear LSO brace when sitting or standing.       Goals of Care Treatment Preferences:  Code Status: Full Code      Nursing:     Labs: POCT  glucose QID and at bedtime    CONSULTS:    Physical Therapy to evaluate and treat.  and Occupational Therapy to evaluate and treat.    MISCELLANEOUS CARE:  NA    WOUND CARE ORDER  Remove dressing.  Ok to shower now, but do not immerse wound in water      Medications: Review discharge medications with patient and family and provide education.         Medication List           Medication List        START taking these medications      HYDROmorphone 4 MG tablet  Commonly known as: DILAUDID  Take 1 tablet (4 mg total) by mouth every 6 (six) hours as needed for Pain.            CHANGE how you take these medications      papaverine 30 mg/mL injection  Tri-Mix - PGE (alprostadil) 10mcg, papavarine 30mg, phentolamine 1mg; dispense 5mL vial, typical starting is 0.2 mL which is equal to 20 units  What changed:   when to take this  reasons to take this            CONTINUE taking these medications      allopurinoL 100 MG tablet  Commonly known as: ZYLOPRIM  TAKE 1 TABLET BY MOUTH TWICE A DAY     aluminum-magnesium hydroxide-simethicone 200-200-20 mg/5 mL Susp  Commonly known as: MAALOX  Take 30 mLs by mouth 4 (four) times daily before meals and nightly.     aspirin 81 MG EC tablet  Commonly known as: ECOTRIN  Take 1 tablet (81 mg total) by mouth once daily.     atorvastatin 40 MG tablet  Commonly known as: LIPITOR  Take 1 tablet (40 mg total) by mouth once daily.     blood-glucose meter Misc  1 Device by Misc.(Non-Drug; Combo Route) route 3 (three) times daily.     calcium carbonate-vitamin D3 600 mg-10 mcg (400 unit) Chew  Commonly known as: CALCIUM 600 WITH VITAMIN D3  Take 1 tablet by mouth once daily.     cyanocobalamin 100 MCG tablet  Commonly known as: VITAMIN B-12  Take 100 mcg by mouth once daily.     DEXCOM G6 TRANSMITTER Viviane  Generic drug: blood-glucose transmitter  Use as directed     DEXCOM G7  Misc  Generic drug: blood-glucose,,cont  Use as directed.     DEXCOM G7 SENSOR Viviane  Generic drug:  "blood-glucose sensor  Use as directed.     diphenhydrAMINE 25 mg capsule  Commonly known as: BENADRYL  Take 25 mg by mouth daily as needed for Allergies (Cold).     furosemide 20 MG tablet  Commonly known as: LASIX  Take 2 tablets (40 mg total) by mouth daily as needed (For Weight Gain > 2-3 lbs in 1 day or 4-6 lbs over 1 week notify PCP and take 40 mg daily for three days).     gabapentin 800 MG tablet  Commonly known as: NEURONTIN  TAKE 1 TABLET BY MOUTH THREE TIMES A DAY     HumaLOG KwikPen Insulin 100 unit/mL pen  Generic drug: insulin lispro  Inject 20 Units into the skin 3 (three) times daily with meals.     lancets 30 gauge Misc  1 lancet by Misc.(Non-Drug; Combo Route) route 4 (four) times daily before meals and nightly.     levothyroxine 88 MCG tablet  Commonly known as: SYNTHROID  TAKE 1 TABLET BY MOUTH EVERY DAY     lisinopriL 20 MG tablet  Commonly known as: PRINIVIL,ZESTRIL  TAKE 1 TABLET BY MOUTH EVERY DAY     MEN'S MULTIVITAMIN GUMMIES 200 mcg Chew  Generic drug: multivit with min-folic acid  Take 1 tablet by mouth once daily.     methocarbamoL 750 MG Tab  Commonly known as: ROBAXIN  Take 1 tablet (750 mg total) by mouth 3 (three) times daily as needed (muscle spasms).     metoprolol succinate 200 MG 24 hr tablet  Commonly known as: TOPROL-XL  Take 1 tablet (200 mg total) by mouth once daily.     MOUNJARO 2.5 mg/0.5 mL Pnij  Generic drug: tirzepatide  Inject 2.5 mg into the skin every 7 days.     NIFEdipine 30 MG (OSM) 24 hr tablet  Commonly known as: PROCARDIA-XL  TAKE 1 TABLET BY MOUTH EVERY DAY     NOVOFINE PLUS 32 gauge x 1/6" Ndle  Generic drug: pen needle, diabetic     polyethylene glycol 17 gram Pwpk  Commonly known as: MIRALAX  Take 17 g by mouth once daily.     * sildenafiL 100 MG tablet  Commonly known as: VIAGRA  Take 1 tablet (100 mg total) by mouth daily as needed for Erectile Dysfunction.     * sildenafiL 50 MG tablet  Commonly known as: VIAGRA  TAKE 1 TABLET BY MOUTH DAILY AS NEEDED FOR " ERECTILE DYSFUNCTION     tacrolimus 1 MG Cap  Commonly known as: PROGRAF  Take 2 capsules (2 mg total) by mouth every morning AND 1 capsule (1 mg total) every evening.     TOUJEO MAX U-300 SOLOSTAR 300 unit/mL (3 mL) insulin pen  Generic drug: insulin glargine U-300 conc  Inject 40 Units into the skin once daily.     traZODone 50 MG tablet  Commonly known as: DESYREL  TAKE 1 TABLET BY MOUTH EVERY DAY IN THE EVENING     TRUE METRIX GLUCOSE TEST STRIP Strp  Generic drug: blood sugar diagnostic  USE 3 TIMES DAILY TO TEST BLOOD GLUCOSE LEVEL           * This list has 2 medication(s) that are the same as other medications prescribed for you. Read the directions carefully, and ask your doctor or other care provider to review them with you.                STOP taking these medications      oxyCODONE-acetaminophen  mg per tablet  Commonly known as: PERCOCET                       Immunizations Administered as of 5/22/2025       Name Date Dose VIS Date Route Exp Date    COVID-19, MRNA, LN-S, PF (Pfizer) (Purple Cap) 3/26/2021  9:44 AM 0.3 mL 12/12/2020 Intramuscular 7/31/2021    Site: Left deltoid     Given By: Raul Garcia RN     : [employer]     Lot: [id number]     COVID-19, MRNA, LN-S, PF (Pfizer) (Purple Cap) 3/3/2021  8:37 AM 0.3 mL 12/12/2020 Intramuscular 6/30/2021    Site: Right deltoid     Given By: Laura Steven RN     : [employer]     Lot: [id number]             This patient has had both covid vaccinations    Some patients may experience side effects after vaccination.  These may include fever, headache, muscle or joint aches.  Most symptoms resolve with 24-48 hours and do not require urgent medical evaluation unless they persist for more than 72 hours or symptoms are concerning for an unrelated medical condition.          _________________________________  Gilson Silva PA-C  05/23/2025

## 2025-05-23 NOTE — PT/OT/SLP PROGRESS
Physical Therapy Treatment    Patient Name:  Vick Gonzalez   MRN:  [id number]    Recommendations:     Discharge Recommendations: High Intensity Therapy  Discharge Equipment Recommendations: to be determined by next level of care, walker, rolling  Barriers to discharge: decreased strength/functional mobility; fall risk    Assessment:     Vick Gonzalez is a 69 y.o. male admitted with a medical diagnosis of Adjacent segment disease of lumbar spine with history of fusion procedure.  He presents with the following impairments/functional limitations: weakness, impaired endurance, impaired self care skills, impaired functional mobility, impaired balance, gait instability, decreased safety awareness, decreased lower extremity function, decreased ROM, impaired skin, impaired joint extensibility, decreased coordination Pt would continue to benefit from P.T. To address impairments listed above.  .    Rehab Prognosis: Fair; patient would benefit from acute skilled PT services to address these deficits and reach maximum level of function.    Recent Surgery: Procedure(s) (LRB):  ARTHRODESIS, SPINE, LUMBAR, OLIF, MINIMALLY INVASIVE (N/A)  FACETECTOMY (N/A) 8 Days Post-Op    Plan:     During this hospitalization, patient to be seen daily to address the identified rehab impairments via gait training, therapeutic activities, therapeutic exercises, neuromuscular re-education and progress toward the following goals:    Plan of Care Expires:  06/16/25    Subjective     Patient/Family Comments/goals: Pt agreed to tx  Pain/Comfort:  Pain Rating 1: 0/10  Pain Rating Post-Intervention 1: 0/10      Objective:     Communicated with Rn prior to session.  Patient found sitting edge of bed with peripheral IV upon PT entry to room.     General Precautions: Standard, diabetic, fall  Orthopedic Precautions: spinal precautions  Braces: LSO  Respiratory Status: Room air     Functional Mobility:  Transfers:     Sit to Stand:  minimum assistance and  with bed minimally elevated with rolling walker  Bed to b/s commode then to w/c: minimum assistance of 2for increased safety with  BRW    using  Step Transfer   Gait: 15ft, then 5ft with BRW and Abigail of 1 and close CGA of another for increased safety secondary to pt with flexed knees in stance and unable to extend completely with vcs/tcs.  3pt gait, slow viri, decreased step length, flexed knees that demonstrated soft incomplete buckle toward the end of gait secondary to decreased strength.  Balance: sitting good, standing fair Rw, gait fair- Rw      AM-PAC 6 CLICK MOBILITY  Turning over in bed (including adjusting bedclothes, sheets and blankets)?: 3  Sitting down on and standing up from a chair with arms (e.g., wheelchair, bedside commode, etc.): 3  Moving from lying on back to sitting on the side of the bed?: 3  Moving to and from a bed to a chair (including a wheelchair)?: 3  Need to walk in hospital room?: 3  Climbing 3-5 steps with a railing?: 1  Basic Mobility Total Score: 16       Treatment & Education:  Pt was sitting EOb and ambulated to b/s commode for BM. Pt was assisted with pulling down and up underwear by OT and PTA with pt standing and RW with Abigail for balance.  Hygiene needs with OT and SBA of PTA for increased safety.  Pt then transferred to w/c as above secondary to transport present for discharge.    Patient left sitting up in w/c with Rn notified..    GOALS:   Multidisciplinary Problems       Physical Therapy Goals          Problem: Physical Therapy    Goal Priority Disciplines Outcome Interventions   Physical Therapy Goal     PT, PT/OT Progressing    Description: Goals to be met by: discharge date     Patient will increase functional independence with mobility by performin. Supine to sit with Modified [place]  2. Sit to supine with Modified [place]  3. Rolling to Left and Right with Modified [place].  4. Sit to stand transfer with Modified [place]  5. Bed to chair  transfer with Stand-by Assistance using Rolling Walker  6. Gait  x 150 feet with Stand-by Assistance using Rolling Walker.   7.  Able to don/doff LSO with mod independence                           Time Tracking:     PT Received On: 05/23/25  PT Start Time: 1535     PT Stop Time: 1605  PT Total Time (min): 30 min     Billable Minutes: Therapeutic Activity 17 and gait training 13       PT/PTA: PTA     Number of PTA visits since last PT visit: 3     05/23/2025

## 2025-05-23 NOTE — PLAN OF CARE
Assumed careof pt from TONY Shabazz. Pt sitting up in bed with HOB elevated at 40o; anxious and agitated. Pt rated pain  9/10. Plan of care was reviewed. Medications administered per MAR. LSO brace worn while sitting at bedside. Safety maintained.     Problem: Adult Inpatient Plan of Care  Goal: Optimal Comfort and Wellbeing  Outcome: Progressing  Goal: Readiness for Transition of Care  Outcome: Progressing     Problem: Diabetes Comorbidity  Goal: Blood Glucose Level Within Targeted Range  Outcome: Progressing     Problem: Wound  Goal: Optimal Functional Ability  Outcome: Progressing

## 2025-05-23 NOTE — PLAN OF CARE
Problem: Occupational Therapy  Goal: Occupational Therapy Goal  Description: Goals to be met by: 6/16/25     Patient will increase functional independence with ADLs by performing:    LE Dressing with Stand-by Assistance using reacher.  Grooming while EOB with Set-up Assistance.  Toileting from toilet with Minimal Assistance for hygiene and clothing management.   Rolling to Bilateral with Stand-by Assistance.   Supine to sit with Minimal Assistance.Goal met 5/21/2025  Stand pivot transfers with Minimal Assistance.  Toilet transfer to bedside commode with Minimal Assistance.    Outcome: Adequate for Care Transition

## 2025-05-27 ENCOUNTER — TELEPHONE (OUTPATIENT)
Dept: TRANSPLANT | Facility: CLINIC | Age: 70
End: 2025-05-27
Payer: MEDICARE

## 2025-05-27 NOTE — TELEPHONE ENCOUNTER
Spoke with patient to reschedule liver ultrasound. Patient states that he just had back surgery and he's not sure when he'll  be able to reschedule.

## 2025-05-28 ENCOUNTER — TELEPHONE (OUTPATIENT)
Dept: NEUROSURGERY | Facility: CLINIC | Age: 70
End: 2025-05-28
Payer: MEDICARE

## 2025-05-28 NOTE — TELEPHONE ENCOUNTER
Returned call to pt, pt stated that he will be discharging from the rehab on Friday or next week. Pt is requesting to know what kind of medication will be called in for him. I stated to pt that I am unsure and when he discharges just give us a call and I will send a message to . Pt voiced understanding

## 2025-05-30 ENCOUNTER — TELEPHONE (OUTPATIENT)
Dept: NEUROSURGERY | Facility: CLINIC | Age: 70
End: 2025-05-30
Payer: MEDICARE

## 2025-06-02 ENCOUNTER — TELEPHONE (OUTPATIENT)
Dept: NEUROSURGERY | Facility: CLINIC | Age: 70
End: 2025-06-02
Payer: MEDICARE

## 2025-06-02 DIAGNOSIS — N52.9 ERECTILE DYSFUNCTION, UNSPECIFIED ERECTILE DYSFUNCTION TYPE: ICD-10-CM

## 2025-06-02 RX ORDER — SILDENAFIL 50 MG/1
50 TABLET, FILM COATED ORAL
Qty: 30 TABLET | Refills: 11 | Status: SHIPPED | OUTPATIENT
Start: 2025-06-02

## 2025-06-04 ENCOUNTER — TELEPHONE (OUTPATIENT)
Dept: NEUROSURGERY | Facility: CLINIC | Age: 70
End: 2025-06-04
Payer: MEDICARE

## 2025-06-05 ENCOUNTER — TELEPHONE (OUTPATIENT)
Dept: NEUROSURGERY | Facility: CLINIC | Age: 70
End: 2025-06-05
Payer: MEDICARE

## 2025-06-06 ENCOUNTER — TELEPHONE (OUTPATIENT)
Dept: NEUROSURGERY | Facility: CLINIC | Age: 70
End: 2025-06-06
Payer: MEDICARE

## 2025-06-07 DIAGNOSIS — G89.18 POST-OP PAIN: Primary | ICD-10-CM

## 2025-06-07 RX ORDER — HYDROCODONE BITARTRATE AND ACETAMINOPHEN 10; 325 MG/1; MG/1
1 TABLET ORAL EVERY 6 HOURS PRN
Qty: 28 TABLET | Refills: 0 | Status: SHIPPED | OUTPATIENT
Start: 2025-06-07 | End: 2025-06-12 | Stop reason: SDUPTHER

## 2025-06-09 ENCOUNTER — TELEPHONE (OUTPATIENT)
Dept: NEUROSURGERY | Facility: CLINIC | Age: 70
End: 2025-06-09
Payer: MEDICARE

## 2025-06-11 ENCOUNTER — TELEPHONE (OUTPATIENT)
Dept: NEUROSURGERY | Facility: CLINIC | Age: 70
End: 2025-06-11
Payer: MEDICARE

## 2025-06-12 DIAGNOSIS — G89.18 POST-OP PAIN: ICD-10-CM

## 2025-06-12 NOTE — TELEPHONE ENCOUNTER
Returned pt's call, pt stated that he needs a refill on his pain medication. I stated to pt that I will send a refill request to . Pt voiced understanding

## 2025-06-13 ENCOUNTER — TELEPHONE (OUTPATIENT)
Dept: NEUROSURGERY | Facility: CLINIC | Age: 70
End: 2025-06-13
Payer: MEDICARE

## 2025-06-13 RX ORDER — HYDROCODONE BITARTRATE AND ACETAMINOPHEN 10; 325 MG/1; MG/1
1 TABLET ORAL EVERY 8 HOURS PRN
Qty: 90 TABLET | Refills: 0 | Status: SHIPPED | OUTPATIENT
Start: 2025-06-13

## 2025-06-13 NOTE — TELEPHONE ENCOUNTER
Pt stated that the norco is making his stomach upset. He want to know can his pain meds be switched to percocet. I stated to him that I will send Dr. Payne a message. Pt voiced understanding.

## 2025-06-16 ENCOUNTER — OFFICE VISIT (OUTPATIENT)
Dept: NEUROSURGERY | Facility: CLINIC | Age: 70
End: 2025-06-16
Payer: MEDICARE

## 2025-06-16 ENCOUNTER — HOSPITAL ENCOUNTER (OUTPATIENT)
Dept: RADIOLOGY | Facility: HOSPITAL | Age: 70
Discharge: HOME | End: 2025-06-16
Attending: NEUROLOGICAL SURGERY
Payer: MEDICARE

## 2025-06-16 ENCOUNTER — TELEPHONE (OUTPATIENT)
Dept: NEUROSURGERY | Facility: CLINIC | Age: 70
End: 2025-06-16

## 2025-06-16 ENCOUNTER — TELEPHONE (OUTPATIENT)
Dept: NEUROSURGERY | Facility: CLINIC | Age: 70
End: 2025-06-16
Payer: MEDICARE

## 2025-06-16 VITALS
DIASTOLIC BLOOD PRESSURE: 96 MMHG | HEIGHT: 74 IN | SYSTOLIC BLOOD PRESSURE: 159 MMHG | BODY MASS INDEX: 32.85 KG/M2 | HEART RATE: 79 BPM | WEIGHT: 255.94 LBS

## 2025-06-16 DIAGNOSIS — Z98.1 S/P LUMBAR FUSION: ICD-10-CM

## 2025-06-16 DIAGNOSIS — Z98.1 S/P LUMBAR SPINAL FUSION: Primary | ICD-10-CM

## 2025-06-16 DIAGNOSIS — G89.18 POST-OPERATIVE PAIN: Primary | ICD-10-CM

## 2025-06-16 PROCEDURE — 1125F AMNT PAIN NOTED PAIN PRSNT: CPT | Mod: CPTII,S$GLB,, | Performed by: PHYSICIAN ASSISTANT

## 2025-06-16 PROCEDURE — 3060F POS MICROALBUMINURIA REV: CPT | Mod: CPTII,S$GLB,, | Performed by: PHYSICIAN ASSISTANT

## 2025-06-16 PROCEDURE — 99999 PR PBB SHADOW E&M-EST. PATIENT-LVL V: CPT | Mod: PBBFAC,,, | Performed by: PHYSICIAN ASSISTANT

## 2025-06-16 PROCEDURE — 72100 X-RAY EXAM L-S SPINE 2/3 VWS: CPT | Mod: 26,,, | Performed by: STUDENT IN AN ORGANIZED HEALTH CARE EDUCATION/TRAINING PROGRAM

## 2025-06-16 PROCEDURE — 99024 POSTOP FOLLOW-UP VISIT: CPT | Mod: S$GLB,,, | Performed by: PHYSICIAN ASSISTANT

## 2025-06-16 PROCEDURE — 3044F HG A1C LEVEL LT 7.0%: CPT | Mod: CPTII,S$GLB,, | Performed by: PHYSICIAN ASSISTANT

## 2025-06-16 PROCEDURE — 3288F FALL RISK ASSESSMENT DOCD: CPT | Mod: CPTII,S$GLB,, | Performed by: PHYSICIAN ASSISTANT

## 2025-06-16 PROCEDURE — 3077F SYST BP >= 140 MM HG: CPT | Mod: CPTII,S$GLB,, | Performed by: PHYSICIAN ASSISTANT

## 2025-06-16 PROCEDURE — 3080F DIAST BP >= 90 MM HG: CPT | Mod: CPTII,S$GLB,, | Performed by: PHYSICIAN ASSISTANT

## 2025-06-16 PROCEDURE — 3066F NEPHROPATHY DOC TX: CPT | Mod: CPTII,S$GLB,, | Performed by: PHYSICIAN ASSISTANT

## 2025-06-16 PROCEDURE — 1159F MED LIST DOCD IN RCRD: CPT | Mod: CPTII,S$GLB,, | Performed by: PHYSICIAN ASSISTANT

## 2025-06-16 PROCEDURE — 1160F RVW MEDS BY RX/DR IN RCRD: CPT | Mod: CPTII,S$GLB,, | Performed by: PHYSICIAN ASSISTANT

## 2025-06-16 PROCEDURE — 72100 X-RAY EXAM L-S SPINE 2/3 VWS: CPT | Mod: TC,FY

## 2025-06-16 PROCEDURE — 1101F PT FALLS ASSESS-DOCD LE1/YR: CPT | Mod: CPTII,S$GLB,, | Performed by: PHYSICIAN ASSISTANT

## 2025-06-16 RX ORDER — OXYCODONE AND ACETAMINOPHEN 10; 325 MG/1; MG/1
1 TABLET ORAL EVERY 6 HOURS PRN
Qty: 28 TABLET | Refills: 0 | Status: SHIPPED | OUTPATIENT
Start: 2025-06-16

## 2025-06-16 NOTE — PROGRESS NOTES
"  Subjective:     Patient ID:  Vick Gonzalez is a 69 y.o. male.    Elmer    Chief Complaint: 4 week po fu    Lspine fusion    HPI    Vick Gonzalez is a 69 y.o. male who presents for follow up.  Patient states overall he is progressing.  He has some pain in the left medial leg.  No right leg pain or paresthesias.  He was at the rehab facility for 2 weeks.  He is walking with a walker at home.  Wearing his back brace.    Patient denies any recent accidents or trauma, no saddle anesthesias, and no bowel or bladder incontinence.      Review of Systems:  Constitution: Negative for chills, fever, night sweats and weight loss.   Musculoskeletal: Negative for falls.   Gastrointestinal: Negative for bowel incontinence, nausea and vomiting.   Genitourinary: Negative for bladder incontinence.   Neurological: Negative for disturbances in coordination and loss of balance.      Objective:      Vitals:    06/16/25 1305   BP: (!) 159/96   Pulse: 79   Weight: 116.1 kg (255 lb 15.3 oz)   Height: 6' 2" (1.88 m)   PainSc:   9   PainLoc: Back         Physical Exam:      General:  Vick Gonzalez is well-developed, well-nourished, appears stated age, in no acute distress, alert and oriented to person, place, and time.    Pulmonary/Chest:  Respiratory effort normal  Abdominal: Exhibits no distension  Psychiatric:  Normal mood and affect.  Behavior is normal.  Judgement and thought content normal      Musculoskeletal:    Lumbar Spine Inspection:  Small right scab on the right lower incision.  No redness swelling or drainage.  LLQ incision CDI.      Neurological:  Alert and oriented to person, place, and time    Muscle strength against resistance:     Right Left   Hip flexion  5 / 5 5 / 5   Hip extension 5 / 5 5 / 5   Hip abduction 5 / 5 5 / 5   Hip adduction  5 / 5 5 / 5   Knee extension  5 / 5 5 / 5   Knee flexion 5 / 5 5 / 5   Dorsiflexion  5 / 5 5 / 5   EHL  5 / 5 5 / 5   Plantar flexion  5 / 5 5 / 5           " Goal Outcome Evaluation:         Summary:  N/V, diarrhea    DATE & TIME: 5/31/25 to 6/1/25 2539-9975   Cognitive Concerns/ Orientation : AOx4   BEHAVIOR & AGGRESSION TOOL COLOR: Green  CIWA SCORE: NA   ABNL VS/O2: VSS on RA  MOBILITY: Independent  PAIN MANAGMENT: Denied  DIET: Regular  BOWEL/BLADDER: Independent to bathroom,Can be incontinent, Diarrhea x5, Immodium x2  ABNL LAB/BG: K+3.5  DRAIN/DEVICES: NS+20KCL @100  TELEMETRY RHYTHM: NSR  SKIN: intact with some bruising  TESTS/PROCEDURES: NA  D/C DAY/GOALS/PLACE: Pending   OTHER IMPORTANT INFO:                            Reflexes:      Clonus:  Negative bilaterally    On gross examination of the bilateral upper extremities, patient has full painfree ROM with no signs of clubbing, cyanosis, edema, or weakness.       No recent imaging      Assessment:          1. S/P lumbar spinal fusion            Plan:          Orders Placed This Encounter    X-Ray Lumbar Spine Ap And Lateral    Ambulatory Referral/Consult to Physical Therapy       -Lumbar xray today  -Continue back brace  -Continue walking  -Start HHPTOT  -PT through [facility] once the HHPTOT is done  -FU with me in 4 weeks for the 8 week po fu with xrays    Follow-Up:  Follow up in about 4 weeks (around 7/14/2025). If there are any questions prior to this, the patient was instructed to contact the office.       Paige Gilbert [facility], PA-C  Neurosurgery  [facility]  06/16/2025

## 2025-06-20 NOTE — TELEPHONE ENCOUNTER
----- Message from Yue Alexander sent at 8/8/2023 10:37 AM CDT -----  Contact: [phone]  Who Called: PT     Patient is calling to get orders for a G7CGM and sensors, orders to be sent to [facility] and signed orders and clinical notes. Fax [phone]     Repositioned pt. Brief was not soiled, no U/O since straight cath that was around 0300. She is sleeping RR 20.    [Fully active, able to carry on all pre-disease performance without restriction] : Status 0 - Fully active, able to carry on all pre-disease performance without restriction [Normal] : affect appropriate [de-identified] : anicteric [de-identified] : small bilateral cervical nodes, posterior auricular nodes measuring about 1cm [de-identified] : CTA b/l [de-identified] : RRR, +S1/S2 [de-identified] : soft NT/ND, no splenomegaly appreciated [de-identified] : no palpable axillary nodes [de-identified] : normal [de-identified] : right foot with 3cm ulcer, no discharge, slight surrounding erythema [de-identified] : nonfocal

## 2025-07-02 ENCOUNTER — LAB VISIT (OUTPATIENT)
Dept: LAB | Facility: HOSPITAL | Age: 70
End: 2025-07-02
Attending: INTERNAL MEDICINE
Payer: MEDICARE

## 2025-07-02 DIAGNOSIS — K74.60 HEPATIC CIRRHOSIS, UNSPECIFIED HEPATIC CIRRHOSIS TYPE, UNSPECIFIED WHETHER ASCITES PRESENT: ICD-10-CM

## 2025-07-02 DIAGNOSIS — Z94.4 S/P LIVER TRANSPLANT: ICD-10-CM

## 2025-07-02 DIAGNOSIS — Z94.4 LIVER REPLACED BY TRANSPLANT: ICD-10-CM

## 2025-07-02 LAB
ABSOLUTE EOSINOPHIL (OHS): 0.55 K/UL
ABSOLUTE MONOCYTE (OHS): 0.74 K/UL (ref 0.3–1)
ABSOLUTE NEUTROPHIL COUNT (OHS): 3.04 K/UL (ref 1.8–7.7)
AFP SERPL-MCNC: 4 NG/ML
ALBUMIN SERPL BCP-MCNC: 3.8 G/DL (ref 3.5–5.2)
ALP SERPL-CCNC: 92 UNIT/L (ref 40–150)
ALT SERPL W/O P-5'-P-CCNC: 16 UNIT/L (ref 10–44)
ANION GAP (OHS): 13 MMOL/L (ref 8–16)
AST SERPL-CCNC: 19 UNIT/L (ref 11–45)
BASOPHILS # BLD AUTO: 0.04 K/UL
BASOPHILS NFR BLD AUTO: 0.5 %
BILIRUB SERPL-MCNC: 0.3 MG/DL (ref 0.1–1)
BUN SERPL-MCNC: 18 MG/DL (ref 8–23)
CALCIUM SERPL-MCNC: 8.6 MG/DL (ref 8.7–10.5)
CHLORIDE SERPL-SCNC: 109 MMOL/L (ref 95–110)
CO2 SERPL-SCNC: 19 MMOL/L (ref 23–29)
CREAT SERPL-MCNC: 1.2 MG/DL (ref 0.5–1.4)
ERYTHROCYTE [DISTWIDTH] IN BLOOD BY AUTOMATED COUNT: 14.2 % (ref 11.5–14.5)
GFR SERPLBLD CREATININE-BSD FMLA CKD-EPI: >60 ML/MIN/1.73/M2
GLUCOSE SERPL-MCNC: 142 MG/DL (ref 70–110)
HCT VFR BLD AUTO: 36.6 % (ref 40–54)
HGB BLD-MCNC: 11.4 GM/DL (ref 14–18)
IMM GRANULOCYTES # BLD AUTO: 0.02 K/UL (ref 0–0.04)
IMM GRANULOCYTES NFR BLD AUTO: 0.2 % (ref 0–0.5)
LYMPHOCYTES # BLD AUTO: 3.93 K/UL (ref 1–4.8)
MCH RBC QN AUTO: 28.1 PG (ref 27–31)
MCHC RBC AUTO-ENTMCNC: 31.1 G/DL (ref 32–36)
MCV RBC AUTO: 90 FL (ref 82–98)
NUCLEATED RBC (/100WBC) (OHS): 0 /100 WBC
PLATELET # BLD AUTO: 200 K/UL (ref 150–450)
PMV BLD AUTO: 12.5 FL (ref 9.2–12.9)
POTASSIUM SERPL-SCNC: 3.9 MMOL/L (ref 3.5–5.1)
PROT SERPL-MCNC: 6.9 GM/DL (ref 6–8.4)
RBC # BLD AUTO: 4.05 M/UL (ref 4.6–6.2)
RELATIVE EOSINOPHIL (OHS): 6.6 %
RELATIVE LYMPHOCYTE (OHS): 47.2 % (ref 18–48)
RELATIVE MONOCYTE (OHS): 8.9 % (ref 4–15)
RELATIVE NEUTROPHIL (OHS): 36.6 % (ref 38–73)
SODIUM SERPL-SCNC: 141 MMOL/L (ref 136–145)
WBC # BLD AUTO: 8.32 K/UL (ref 3.9–12.7)

## 2025-07-02 PROCEDURE — 80053 COMPREHEN METABOLIC PANEL: CPT

## 2025-07-02 PROCEDURE — 85025 COMPLETE CBC W/AUTO DIFF WBC: CPT

## 2025-07-02 PROCEDURE — 36415 COLL VENOUS BLD VENIPUNCTURE: CPT | Mod: PO

## 2025-07-02 PROCEDURE — 80197 ASSAY OF TACROLIMUS: CPT

## 2025-07-02 PROCEDURE — 82105 ALPHA-FETOPROTEIN SERUM: CPT

## 2025-07-03 ENCOUNTER — TELEPHONE (OUTPATIENT)
Dept: NEUROSURGERY | Facility: CLINIC | Age: 70
End: 2025-07-03
Payer: MEDICARE

## 2025-07-03 LAB — TACROLIMUS BLD-MCNC: 3.9 NG/ML (ref 5–15)

## 2025-07-03 NOTE — TELEPHONE ENCOUNTER
Copied from CRM #[id number]. Topic: General Inquiry - Patient Advice  >> Jul 3, 2025  9:04 AM Camilla wrote:  Type:  requesting a call     Who Called: pt   Would the patient rather a call back or a response via [facility]? Call   Best Call Back Number: [phone] (M)  Additional Information:        Returned call to the pt, he stated when Dr. Payne sent his pain meds to the pharmacy last month he couldn't pick it up because he already sent a prescription there. He stated he will call the pharmacy and see if he can come and  the new prescription.

## 2025-07-03 NOTE — TELEPHONE ENCOUNTER
Copied from CRM #[id number]. Topic: General Inquiry - Patient Advice  >> Jul 3, 2025 11:27 AM Stacey wrote:  Type:  Needs Medical Advice    Who Called: pt  Would the patient rather a call back or a response via [facility]? Call  Best Call Back Number: [phone]  Additional Information: pt wanting to speak regarding his pain medication being incorrect asking for call back      Returned call to pt, pt is requesting to know why did  call him in 28 pills. I stated to pt that I am unsure. Pt stated that when he runs out he will call for a refill. Pt voiced understanding

## 2025-07-07 ENCOUNTER — RESULTS FOLLOW-UP (OUTPATIENT)
Dept: TRANSPLANT | Facility: HOSPITAL | Age: 70
End: 2025-07-07
Payer: MEDICARE

## 2025-07-07 NOTE — TELEPHONE ENCOUNTER
Letter sent, labs stable and no medication changes are needed. Repeat labs due 9/29/25 per protocol.  ----- Message from James Coleman MD sent at 7/7/2025  1:45 PM CDT -----  Results reviewed    ----- Message -----  From: Lab, Background User  Sent: 7/2/2025   3:43 PM CDT  To: James Coleman MD

## 2025-07-09 DIAGNOSIS — G89.18 POST-OPERATIVE PAIN: ICD-10-CM

## 2025-07-09 RX ORDER — OXYCODONE AND ACETAMINOPHEN 10; 325 MG/1; MG/1
1 TABLET ORAL EVERY 6 HOURS PRN
Qty: 28 TABLET | Refills: 0 | Status: SHIPPED | OUTPATIENT
Start: 2025-07-09

## 2025-07-09 NOTE — TELEPHONE ENCOUNTER
Copied from CRM #[id number]. Topic: General Inquiry - Patient Advice  >> Jul 9, 2025  3:17 PM Carri wrote:  Type:  Call    Who Called:pt  Does the patient know what this is regarding?:Refill  Would the patient rather a call back or a response via [facility]? call  Best Call Back Number:[phone]   Additional Information: oxyCODONE-acetaminophen (PERCOCET)  mg per tablet are running out.      Returned call to pt, stated to the pt that we will send a refill request to Dr. Payne. Pt voiced understanding.

## 2025-07-15 DIAGNOSIS — G89.18 POST-OPERATIVE PAIN: ICD-10-CM

## 2025-07-15 RX ORDER — OXYCODONE AND ACETAMINOPHEN 10; 325 MG/1; MG/1
1 TABLET ORAL EVERY 6 HOURS PRN
Qty: 28 TABLET | Refills: 0 | Status: CANCELLED | OUTPATIENT
Start: 2025-07-15

## 2025-07-15 NOTE — TELEPHONE ENCOUNTER
Copied from CRM #[id number]. Topic: Medications - Medication Refill  >> Jul 15, 2025 12:47 PM Raul wrote:  .Type:  Needs Medical Advice    Who Called: Pt    Pharmacy name and phone #:  [id number] - [place] - [address]  [address] [address]  Phone: [phone] Fax: [phone]  Would the patient rather a call back or a response via [facility]?  Call back  Best Call Back Number: [phone]  Additional Information: Pt. Is requesting a refill on his oxyCODONE-acetaminophen (PERCOCET)  mg per tablet  He is going out of town and needs it called in before Friday

## 2025-07-16 NOTE — TELEPHONE ENCOUNTER
Copied from CRM #[id number]. Topic: Medications - Medication Status Check   >> Jul 16, 2025 12:22 PM Walker wrote:  Type:  RX Refill Request/ Medication Status Check    Who Called: Patient   Refill or New Rx:Refill   RX Name and Strength:oxyCODONE-acetaminophen (PERCOCET)  mg per tablet  Would the patient rather a call back or a response via [facility]? call  Best Call Back Number:[phone]  Additional Information: Patient would like a call regarding Medication Status Check      Returned pt's call, informed pt that I did send  a refill request on yesterday. Pt voiced understanding

## 2025-07-17 ENCOUNTER — TELEPHONE (OUTPATIENT)
Dept: NEUROSURGERY | Facility: CLINIC | Age: 70
End: 2025-07-17
Payer: MEDICARE

## 2025-07-17 ENCOUNTER — HOSPITAL ENCOUNTER (OUTPATIENT)
Dept: RADIOLOGY | Facility: HOSPITAL | Age: 70
Discharge: HOME | End: 2025-07-17
Attending: PHYSICIAN ASSISTANT
Payer: MEDICARE

## 2025-07-17 ENCOUNTER — OFFICE VISIT (OUTPATIENT)
Dept: NEUROSURGERY | Facility: CLINIC | Age: 70
End: 2025-07-17
Payer: MEDICARE

## 2025-07-17 VITALS
SYSTOLIC BLOOD PRESSURE: 165 MMHG | BODY MASS INDEX: 32.85 KG/M2 | HEIGHT: 74 IN | WEIGHT: 255.94 LBS | DIASTOLIC BLOOD PRESSURE: 89 MMHG | HEART RATE: 82 BPM

## 2025-07-17 DIAGNOSIS — G89.18 POST-OPERATIVE PAIN: ICD-10-CM

## 2025-07-17 DIAGNOSIS — Z98.1 S/P LUMBAR SPINAL FUSION: ICD-10-CM

## 2025-07-17 DIAGNOSIS — Z98.1 S/P LUMBAR SPINAL FUSION: Primary | ICD-10-CM

## 2025-07-17 PROCEDURE — 1160F RVW MEDS BY RX/DR IN RCRD: CPT | Mod: CPTII,S$GLB,, | Performed by: PHYSICIAN ASSISTANT

## 2025-07-17 PROCEDURE — 72100 X-RAY EXAM L-S SPINE 2/3 VWS: CPT | Mod: 26,,, | Performed by: RADIOLOGY

## 2025-07-17 PROCEDURE — 3044F HG A1C LEVEL LT 7.0%: CPT | Mod: CPTII,S$GLB,, | Performed by: PHYSICIAN ASSISTANT

## 2025-07-17 PROCEDURE — 3077F SYST BP >= 140 MM HG: CPT | Mod: CPTII,S$GLB,, | Performed by: PHYSICIAN ASSISTANT

## 2025-07-17 PROCEDURE — 3060F POS MICROALBUMINURIA REV: CPT | Mod: CPTII,S$GLB,, | Performed by: PHYSICIAN ASSISTANT

## 2025-07-17 PROCEDURE — 3288F FALL RISK ASSESSMENT DOCD: CPT | Mod: CPTII,S$GLB,, | Performed by: PHYSICIAN ASSISTANT

## 2025-07-17 PROCEDURE — 3079F DIAST BP 80-89 MM HG: CPT | Mod: CPTII,S$GLB,, | Performed by: PHYSICIAN ASSISTANT

## 2025-07-17 PROCEDURE — 1101F PT FALLS ASSESS-DOCD LE1/YR: CPT | Mod: CPTII,S$GLB,, | Performed by: PHYSICIAN ASSISTANT

## 2025-07-17 PROCEDURE — 3066F NEPHROPATHY DOC TX: CPT | Mod: CPTII,S$GLB,, | Performed by: PHYSICIAN ASSISTANT

## 2025-07-17 PROCEDURE — 72100 X-RAY EXAM L-S SPINE 2/3 VWS: CPT | Mod: TC

## 2025-07-17 PROCEDURE — 99024 POSTOP FOLLOW-UP VISIT: CPT | Mod: S$GLB,,, | Performed by: PHYSICIAN ASSISTANT

## 2025-07-17 PROCEDURE — 99999 PR PBB SHADOW E&M-EST. PATIENT-LVL V: CPT | Mod: PBBFAC,,, | Performed by: PHYSICIAN ASSISTANT

## 2025-07-17 PROCEDURE — 1125F AMNT PAIN NOTED PAIN PRSNT: CPT | Mod: CPTII,S$GLB,, | Performed by: PHYSICIAN ASSISTANT

## 2025-07-17 PROCEDURE — 1159F MED LIST DOCD IN RCRD: CPT | Mod: CPTII,S$GLB,, | Performed by: PHYSICIAN ASSISTANT

## 2025-07-17 RX ORDER — OXYCODONE AND ACETAMINOPHEN 10; 325 MG/1; MG/1
1 TABLET ORAL EVERY 6 HOURS PRN
Qty: 28 TABLET | Refills: 0 | Status: SHIPPED | OUTPATIENT
Start: 2025-07-17

## 2025-07-17 NOTE — TELEPHONE ENCOUNTER
Returned call to the pt, I stated that he did send his prescription to his pharmacy. Pt voiced understanding.

## 2025-07-17 NOTE — TELEPHONE ENCOUNTER
Copied from CRM #[id number]. Topic: Medications - Medication Status Check   >> Jul 17, 2025  9:35 AM Masha wrote:  Type:  Medication Status Check     Who Called: Pt   Would the patient rather a call back or a response via [facility]? Call back   Best Call Back Number: [phone]  Additional Information: Please be advised, pt states that they just spoke w/ office not to long ago and is needing a call back, pt states that he was told by pharmacy that prescription isn't on file for him to be able to refill      Returned pt's call, informed pt that  has not sent anything yet for his medication. I also stated that he can ask Pagie if she can send something in for him. Pt voiced understanding  
Unknown

## 2025-07-17 NOTE — PROGRESS NOTES
"Subjective:     Patient ID:  Vick Gonzalez is a 69 y.o. male.    Elmer    Chief Complaint: 6 week po fu    Lspine fusion    HPI    Vick Gonzalez is a 69 y.o. male who presents for follow up.  Overall he feels like he is getting better.  He does have pain in the low back.  No leg pain or paresthesias.  He is walking with a walker.  He is able to stand more upright.  He is wearing a back brace.    Patient denies any recent accidents or trauma, no saddle anesthesias, and no bowel or bladder incontinence.      Review of Systems:  Constitution: Negative for chills, fever, night sweats and weight loss.   Musculoskeletal: Negative for falls.   Gastrointestinal: Negative for bowel incontinence, nausea and vomiting.   Genitourinary: Negative for bladder incontinence.   Neurological: Negative for disturbances in coordination and loss of balance.      Objective:      Vitals:    07/17/25 1311   BP: (!) 165/89   Pulse: 82   Weight: 116.1 kg (255 lb 15.3 oz)   Height: 6' 2" (1.88 m)   PainSc:   7   PainLoc: Back         Physical Exam:      General:  Vick Gonzalez is well-developed, well-nourished, appears stated age, in no acute distress, alert and oriented to person, place, and time.    Pulmonary/Chest:  Respiratory effort normal  Abdominal: Exhibits no distension  Psychiatric:  Normal mood and affect.  Behavior is normal.  Judgement and thought content normal      Musculoskeletal:    Lumbar Spine Inspection:  Healed surgical scars with no visible rashes.  Small scab noted on right lower back incision.      Neurological:  Alert and oriented to person, place, and time    Muscle strength against resistance:     Right Left   Hip flexion  5 / 5 5 / 5   Knee extension  5 / 5 5 / 5   Knee flexion 5 / 5 4 / 5   Dorsiflexion  5 / 5 5 / 5   EHL  5 / 5 5 / 5   Plantar flexion  5 / 5 5 / 5       Reflexes:    Clonus:  Negative bilaterally    On gross examination of the bilateral upper extremities, patient has full painfree ROM with " no signs of clubbing, cyanosis, edema, or weakness.       XRAY Interpretation:     Lumbar spine xrays were personally reviewed today.  No fractures.  Hardware intact.    Assessment:          1. S/P lumbar spinal fusion    2. Post-operative pain            Plan:          Orders Placed This Encounter    Ambulatory Referral/Consult to Physical Therapy    oxyCODONE-acetaminophen (PERCOCET)  mg per tablet       -physical therapy through [facility]   -refilled Percocet   -continue walking  -Patient will follow-up with Dr. Payne in 6 weeks with xrays for the 3 month post op follow-up.      Follow-Up:  Follow up in about 6 weeks (around 8/28/2025). If there are any questions prior to this, the patient was instructed to contact the office.       HERMELINDO Iraheta, PA-C  Neurosurgery  [facility]  07/17/2025

## 2025-07-22 ENCOUNTER — HOSPITAL ENCOUNTER (OUTPATIENT)
Dept: RADIOLOGY | Facility: HOSPITAL | Age: 70
Discharge: HOME | End: 2025-07-22
Attending: INTERNAL MEDICINE
Payer: MEDICARE

## 2025-07-22 DIAGNOSIS — K74.60 HEPATIC CIRRHOSIS, UNSPECIFIED HEPATIC CIRRHOSIS TYPE, UNSPECIFIED WHETHER ASCITES PRESENT: ICD-10-CM

## 2025-07-22 DIAGNOSIS — Z94.4 S/P LIVER TRANSPLANT: ICD-10-CM

## 2025-07-22 PROCEDURE — 93976 VASCULAR STUDY: CPT | Mod: TC

## 2025-07-23 ENCOUNTER — CLINICAL SUPPORT (OUTPATIENT)
Dept: REHABILITATION | Facility: HOSPITAL | Age: 70
End: 2025-07-23
Payer: MEDICARE

## 2025-07-23 ENCOUNTER — RESULTS FOLLOW-UP (OUTPATIENT)
Dept: HEPATOLOGY | Facility: CLINIC | Age: 70
End: 2025-07-23
Payer: MEDICARE

## 2025-07-23 DIAGNOSIS — M53.86 DECREASED ROM OF LUMBAR SPINE: ICD-10-CM

## 2025-07-23 DIAGNOSIS — R53.1 DECREASED STRENGTH: Primary | ICD-10-CM

## 2025-07-23 DIAGNOSIS — G89.18 POST-OPERATIVE PAIN: ICD-10-CM

## 2025-07-23 DIAGNOSIS — Z98.1 S/P LUMBAR SPINAL FUSION: ICD-10-CM

## 2025-07-23 PROCEDURE — 97530 THERAPEUTIC ACTIVITIES: CPT | Mod: PN

## 2025-07-23 PROCEDURE — 97161 PT EVAL LOW COMPLEX 20 MIN: CPT | Mod: PN

## 2025-07-23 RX ORDER — OXYCODONE AND ACETAMINOPHEN 10; 325 MG/1; MG/1
1 TABLET ORAL EVERY 6 HOURS PRN
Qty: 28 TABLET | Refills: 0 | Status: CANCELLED | OUTPATIENT
Start: 2025-07-23

## 2025-07-23 NOTE — LETTER
July 24, 2025    Vick Gonzalez  [address] [place] [address]             [facility] 1st Fl  [address] [facility] [address]  Phone: [phone] Mr. Gonzalez,    Dr. Coleman reviewed your ultrasound. He said it is stable. Next ultrasound will be due in 6 months - 1/2026.    Please call the office with any questions or concerns.              Sincerely,      Kandy Herrera, RN, BSN, CCTC  Sr Liver Transplant Coordinator

## 2025-07-24 RX ORDER — OXYCODONE AND ACETAMINOPHEN 10; 325 MG/1; MG/1
1 TABLET ORAL EVERY 6 HOURS PRN
Qty: 28 TABLET | Refills: 0 | Status: SHIPPED | OUTPATIENT
Start: 2025-07-24

## 2025-07-24 NOTE — TELEPHONE ENCOUNTER
Letter sent, ultrasound stable  ----- Message from James Coleman MD sent at 7/23/2025 10:11 AM CDT -----  Results reviewed    ----- Message -----  From: Interface, Rad Results In  Sent: 7/22/2025   2:15 PM CDT  To: James Coleman MD

## 2025-07-24 NOTE — TELEPHONE ENCOUNTER
Copied from CRM #[id number]. Topic: General Inquiry - Return Call  >> Jul 24, 2025  9:39 AM Sulma wrote:  .Type:  Needs Medical Advice    Who Called: pt    Would the patient rather a call back or a response via [facility]? Call back   Best Call Back Number: [phone]  Additional Information:      Pt stated he would like a call back        Returned call to pt, pt is requesting to know if  can fill his medication today. I stated to pt that his refill request was sent on yesterday and we are still waiting on  to sign off on it. Pt voiced understanding

## 2025-07-28 ENCOUNTER — CLINICAL SUPPORT (OUTPATIENT)
Dept: REHABILITATION | Facility: HOSPITAL | Age: 70
End: 2025-07-28
Payer: MEDICARE

## 2025-07-28 DIAGNOSIS — R53.1 DECREASED STRENGTH: Primary | ICD-10-CM

## 2025-07-28 DIAGNOSIS — M53.86 DECREASED ROM OF LUMBAR SPINE: ICD-10-CM

## 2025-07-28 PROCEDURE — 97530 THERAPEUTIC ACTIVITIES: CPT | Mod: PN,CQ

## 2025-07-28 PROCEDURE — 97110 THERAPEUTIC EXERCISES: CPT | Mod: PN,CQ

## 2025-07-28 PROCEDURE — 97112 NEUROMUSCULAR REEDUCATION: CPT | Mod: PN,CQ

## 2025-07-30 ENCOUNTER — CLINICAL SUPPORT (OUTPATIENT)
Dept: REHABILITATION | Facility: HOSPITAL | Age: 70
End: 2025-07-30
Payer: MEDICARE

## 2025-07-30 ENCOUNTER — LAB VISIT (OUTPATIENT)
Dept: LAB | Facility: HOSPITAL | Age: 70
End: 2025-07-30
Attending: INTERNAL MEDICINE
Payer: MEDICARE

## 2025-07-30 DIAGNOSIS — R53.1 DECREASED STRENGTH: Primary | ICD-10-CM

## 2025-07-30 DIAGNOSIS — Z94.4 LIVER REPLACED BY TRANSPLANT: ICD-10-CM

## 2025-07-30 DIAGNOSIS — G89.18 POST-OPERATIVE PAIN: ICD-10-CM

## 2025-07-30 DIAGNOSIS — M53.86 DECREASED ROM OF LUMBAR SPINE: ICD-10-CM

## 2025-07-30 LAB
ABSOLUTE EOSINOPHIL (OHS): 0.56 K/UL
ABSOLUTE MONOCYTE (OHS): 0.65 K/UL (ref 0.3–1)
ABSOLUTE NEUTROPHIL COUNT (OHS): 3.18 K/UL (ref 1.8–7.7)
ALBUMIN SERPL BCP-MCNC: 3.9 G/DL (ref 3.5–5.2)
ALP SERPL-CCNC: 78 UNIT/L (ref 40–150)
ALT SERPL W/O P-5'-P-CCNC: 22 UNIT/L (ref 0–55)
ANION GAP (OHS): 9 MMOL/L (ref 8–16)
AST SERPL-CCNC: 23 UNIT/L (ref 0–50)
BASOPHILS # BLD AUTO: 0.02 K/UL
BASOPHILS NFR BLD AUTO: 0.3 %
BILIRUB SERPL-MCNC: 0.3 MG/DL (ref 0.1–1)
BUN SERPL-MCNC: 14 MG/DL (ref 8–23)
CALCIUM SERPL-MCNC: 9 MG/DL (ref 8.7–10.5)
CHLORIDE SERPL-SCNC: 108 MMOL/L (ref 95–110)
CO2 SERPL-SCNC: 24 MMOL/L (ref 23–29)
CREAT SERPL-MCNC: 1 MG/DL (ref 0.5–1.4)
ERYTHROCYTE [DISTWIDTH] IN BLOOD BY AUTOMATED COUNT: 13.5 % (ref 11.5–14.5)
GFR SERPLBLD CREATININE-BSD FMLA CKD-EPI: >60 ML/MIN/1.73/M2
GLUCOSE SERPL-MCNC: 169 MG/DL (ref 70–110)
HCT VFR BLD AUTO: 36.6 % (ref 40–54)
HGB BLD-MCNC: 11.4 GM/DL (ref 14–18)
IMM GRANULOCYTES # BLD AUTO: 0.01 K/UL (ref 0–0.04)
IMM GRANULOCYTES NFR BLD AUTO: 0.1 % (ref 0–0.5)
LYMPHOCYTES # BLD AUTO: 2.95 K/UL (ref 1–4.8)
MCH RBC QN AUTO: 27.6 PG (ref 27–31)
MCHC RBC AUTO-ENTMCNC: 31.1 G/DL (ref 32–36)
MCV RBC AUTO: 89 FL (ref 82–98)
NUCLEATED RBC (/100WBC) (OHS): 0 /100 WBC
PLATELET # BLD AUTO: 164 K/UL (ref 150–450)
PMV BLD AUTO: 12.8 FL (ref 9.2–12.9)
POTASSIUM SERPL-SCNC: 4 MMOL/L (ref 3.5–5.1)
PROT SERPL-MCNC: 7.3 GM/DL (ref 6–8.4)
RBC # BLD AUTO: 4.13 M/UL (ref 4.6–6.2)
RELATIVE EOSINOPHIL (OHS): 7.6 %
RELATIVE LYMPHOCYTE (OHS): 40 % (ref 18–48)
RELATIVE MONOCYTE (OHS): 8.8 % (ref 4–15)
RELATIVE NEUTROPHIL (OHS): 43.2 % (ref 38–73)
SODIUM SERPL-SCNC: 141 MMOL/L (ref 136–145)
TACROLIMUS BLD-MCNC: 2.1 NG/ML (ref 5–15)
WBC # BLD AUTO: 7.37 K/UL (ref 3.9–12.7)

## 2025-07-30 PROCEDURE — 97530 THERAPEUTIC ACTIVITIES: CPT | Mod: PN

## 2025-07-30 PROCEDURE — 85025 COMPLETE CBC W/AUTO DIFF WBC: CPT

## 2025-07-30 PROCEDURE — 36415 COLL VENOUS BLD VENIPUNCTURE: CPT | Mod: PO

## 2025-07-30 PROCEDURE — 80053 COMPREHEN METABOLIC PANEL: CPT

## 2025-07-30 PROCEDURE — 97112 NEUROMUSCULAR REEDUCATION: CPT | Mod: PN

## 2025-07-30 PROCEDURE — 80197 ASSAY OF TACROLIMUS: CPT

## 2025-07-30 RX ORDER — OXYCODONE AND ACETAMINOPHEN 10; 325 MG/1; MG/1
1 TABLET ORAL EVERY 6 HOURS PRN
Qty: 28 TABLET | Refills: 0 | Status: SHIPPED | OUTPATIENT
Start: 2025-07-30

## 2025-07-30 NOTE — TELEPHONE ENCOUNTER
Copied from CRM #[id number]. Topic: Medications - Medication Refill  >> Jul 30, 2025  8:55 AM Sulma wrote:  .Type:  RX Refill Request    Who Called: pt  Refill or New Rx:refill  RX Name and Strength:oxyCODONE-acetaminophen (PERCOCET)  mg per tablet  How is the patient currently taking it? (ex. 1XDay): Take 1 tablet by mouth every 6 (six) hours as needed for Pain. - Oral  Is this a 30 day or 90 day RX:28 tablet  Preferred Pharmacy with phone number:[id number] - [place] - [address]  Local or Mail Order:local  Ordering Provider:Elmer  Would the patient rather a call back or a response via [facility]? Call back   Best Call Back Number:[phone]   Additional Information:

## 2025-08-04 PROBLEM — M53.86 DECREASED ROM OF LUMBAR SPINE: Status: ACTIVE | Noted: 2025-08-04

## 2025-08-04 PROBLEM — Z98.1 S/P LUMBAR SPINAL FUSION: Status: ACTIVE | Noted: 2025-08-04

## 2025-08-04 PROBLEM — R53.1 DECREASED STRENGTH: Status: ACTIVE | Noted: 2025-08-04

## 2025-08-04 NOTE — PROGRESS NOTES
Outpatient Rehab    Physical Therapy Evaluation    Patient Name: Vick Gonzalez  MRN: [id number]  YOB: 1955  Encounter Date: 7/23/2025    Therapy Diagnosis:   Encounter Diagnoses   Name Primary?    S/P lumbar spinal fusion     Decreased strength Yes    Decreased ROM of lumbar spine      Physician: Paige Gilbert, *    Physician Orders: Eval and Treat  Medical Diagnosis: S/P lumbar spinal fusion  Surgical Diagnosis: 1. Left L4-5 oblique interbody fusion, placement of interbody spacer, DePuy Conduit LLIF spacer filled with allograft BMP and DBM  2. L4-S1 posterior segmental instrumentation using DePVOLITIONRX Viper Prime system  3. L4-5 laminectomy, medial facetectomy, foraminotomy  4. Registration of navigated instrumentation using intraoperative 3D imaging [employer] and [place] station   Surgical Date: 5/15/2025  Days Since Last Surgery: 81    Visit # / Visits Authorized:  1 / 1  Insurance Authorization Period: 7/17/2025 to 7/17/2026  Date of Evaluation: 7/23/2025  Plan of Care Certification: 7/23/2025 to 9/19/25     Time In: 1310   Time Out: 1400  Total Time (in minutes): 50   Total Billable Time (in minutes): 50    Intake Outcome Measure for FOTO Survey    Therapist reviewed FOTO scores for Vick Gonzalez on 7/23/2025.   FOTO report - see Media section or FOTO account episode details.     Intake Score (%): 48    Precautions:  Right Lower Extremity Weight-Bearing Status: Full weight-bearing  Left Lower Extremity Weight-Bearing Status: Full weight-bearing  Additional Precautions and Protocol Details: Bending, Lifting, Twisting precautions. Wearing TLSO brace and using RW for balance.    Subjective   History of Present Illness  Vick is a 69 y.o. male who reports to physical therapy with a chief concern of Low back pain with mobility.     The patient reports a medical diagnosis of Z98.1 (ICD-10-CM) - S/P lumbar spinal fusion.      Patient reports a surgery of 1. Left L4-5 oblique interbody fusion,  placement of interbody spacer, DePuy Conduit LLIF spacer filled with allograft BMP and DBM  2. L4-S1 posterior segmental instrumentation using DePuy Viper Prime system  3. L4-5 laminectomy, medial facetectomy, foraminotomy  4. Registration of navigated instrumentation using intraoperative 3D imaging [facility] and [place] station. Surgery occurred on 05/15/25.               History of Present Condition/Illness: Lumbar fusion L4-5 on 5/15/25, 2 other lumbar surgeries over past 3 years, improved pain with each surgery, but pain eventually came back and worsened. Before last surgery he was having severe low back, buttock, and leg pain with numbness/tingling in each leg. Spent 2 weeks in [facility] after surgery and then [facility] rehab for 2 weeks. Now only has left thigh numbness and tingling, left leg feels weaker than right, and having trouble with his balance and getting around. Using RW at home, but did have any home health. Used to be a smoker 20 years ago, family members in household smoke though.          Activities of Daily Living  Social history was obtained from Patient.          Patient Responsibilities: Driving, Personal ADL, Health management, Home management, Community mobility    Previously independent with activities of daily living? Yes     Currently independent with activities of daily living? Yes              Pain     Patient reports a current pain level of 6/10. Pain at best is reported as 5/10. Pain at worst is reported as 10/10.   Location: Low back lumbar spine  Clinical Progression (since onset): Stable  Pain Qualities: Aching, Burning, Knife-like, Pulling, Sharp, Tenderness, Tightness, Dull  Pain-Relieving Factors: Activity modification, Ice, Lying down, Medications - prescription, Movement, Rest, Sitting  Pain-Aggravating Factors: Transfers, Standing, Stair climbing, Straightening, Squatting, Lifting, Bending         Review of Systems  Patient reports: Diabetes  Patient denies: Bladder Incontinence,  Bowel Incontinence, Fever, Night Pain, Saddle Numbness, and Bladder Urgency        Living Arrangements  Living Situation  Housing: Home independently        Employment  Does the patient's condition impact their ability to work?: No  Employment Status: Retired  [employer] for about 30+ years      Past Medical History/Physical Systems Review:   Vick Gonzalez  has a past medical history of Acute congestive heart failure, Anemia, Anxiety, Cataract, Chronic pain syndrome, CKD (chronic kidney disease), stage III, Coronary artery disease involving native coronary artery of native heart with angina pectoris, Diabetes mellitus type II, uncontrolled, Discitis of lumbosacral region, ED (erectile dysfunction), Encounter for blood transfusion, Genital herpes, Gout, arthritis, History of alcohol abuse, History of hepatitis C, s/p successful Rx w/ SVR24 (cure) - 5/2018, History of positive PPD, treatment status unknown, History of substance abuse, Hypertension, Hypothyroidism, Liver replaced by transplant, Pancreatitis, Peptic ulcer disease, and Pulmonary emphysema, unspecified emphysema type.    Vick Gonzalez  has a past surgical history that includes Liver transplant (06/2010); Cholecystectomy; Spine surgery; Injection of joint (Right, 12/2/2019); Carpal tunnel release (Left, 10/29/2021); Anterior cervical discectomy w/ fusion (N/A, 8/22/2022); Decompression of cervical spine by anterior approach with fusion (8/22/2022); Transforaminal epidural injection of steroid (Left, 5/29/2023); Left heart catheterization (Left, 8/2/2023); Coronary angiography (N/A, 8/2/2023); laminectomy, spine, for neurostimulator electrode insertion (N/A, 8/29/2024); Insertion of spinal neurostimulator (N/A, 9/3/2024); fusion, spine, lumbar, olif, minimally invasive (N/A, 5/15/2025); and Facetectomy of vertebra (N/A, 5/15/2025).    Vick has a current medication list which includes the following prescription(s): allopurinol,  aluminum-magnesium hydroxide-simethicone, aspirin, atorvastatin, blood-glucose meter, dexcom g7 , dexcom g7 sensor, dexcom g6 transmitter, calcium carbonate-vitamin d3, cyanocobalamin, diphenhydramine, furosemide, gabapentin, toujeo max u-300 solostar, insulin lispro, lancets, levothyroxine, lisinopril, methocarbamol, metoprolol succinate, men's multivitamin gummies, nifedipine, novofine plus, oxycodone-acetaminophen, papaverine, polyethylene glycol, sildenafil, tacrolimus, mounjaro, trazodone, true metrix glucose test strip, and [DISCONTINUED] insulin aspart u-100.    Review of patient's allergies indicates:  No Known Allergies     Objective      Cervical Thoracic Sensation  General Cervical/Thoracic Sensation  Impaired: Right and Left  Right Cervical/Thoracic Sensation  Diminished: Light Touch  Right Cervical/Thoracic Sensation Light Touch Comment: lower arms    Left Cervical/Thoracic Sensation  Diminished: Light Touch  Left Cervical/Thoracic Sensation Light Touch Comment: lower arms         Lower Extremity Sensation  General Lumbar/Lower Extremity Sensation  Intact: Right  Impaired: Left  Right Lumbar/Lower Extremity Sensation  Intact: Light Touch, Sharp/Dull Discrimination, Static Two Point Discrimination, Dynamic Two Point Discrimination, Kinesthesia, and Proprioception  Right Lumbar/Lower Extremity Sensation Stocking Glove Pattern: No    Left Lumbar/Lower Extremity Sensation  Diminished: Light Touch  Left Lumbar/Lower Extremity Sensation Light Touch Comment: left thigh anteriorly and laterally             Right Lower Extremity Reflexes  Patellar, L4: Trace (1+)         Achilles, S1: Absent (0)    Babinski reflex Absent.    Left Lower Extremity Reflexes  Patellar, L4: Absent (0)          Achilles, S1: Absent (0)    Babinski reflex Absent.    Lower Extremity Reflex Details  Negative for clonus Bilateral         Spinal Mobility  Hypomobile: Cervical, Thoracic, and Lumbosacral           Lumbar Range of  Motion   Active (deg) Passive (deg) Pain   Flexion 40   Yes   Extension 5   Yes   Right Lateral Flexion         Right Rotation 40       Left Lateral Flexion         Left Rotation 40   Yes     Numbers above represent persent of normal motion                 Hip Strength - Planes of Motion   Right Strength Right Pain Left Strength Left  Pain   Flexion (L2) 3+   3     Extension 2+   2+     ABduction 4-   3     ADduction 4   4-     Internal Rotation           External Rotation               Knee Strength   Right Strength Right Pain Left Strength Left  Pain   Flexion (S2) 4-   3+     Prone Flexion           Extension (L3) 4-   3+            Ankle/Foot Strength - Planes of Motion   Right Strength Right Pain Left Strength Left  Pain   Dorsiflexion (L4) 4+   3+     Plantar Flexion (S1) 4+   4     Inversion           Eversion           Great Toe Flexion           Great Toe Extension (L5)           Lesser Toes Flexion           Lesser Toes Extension                  Cervical Screen  Cervical Range of Motion           Thoracic Range of Motion        Cervical hypomobility overall.    Lumbar/Pelvic Girdle Special Tests       Lumbar Tests - SLR and Tension  Positive: Right Passive Straight Leg Raise and Left Passive Straight Leg Raise                       Transfers Assessment  Sit to Stand Assistance: Independent  Chair to Bed Assistance: Independent  Bed to Chair Assistance: Independent  Car Transfer Assistance: Independent    Bed Mobility Assessment  Rolling Assistance: Independent  Sidelying to Sit Assistance: Independent  Sit to Sidelying Assistance: Independent  Scooting to Edge of Bed Assistance: Independent      Fall Risk  Functional mobility test results suggest the patient is: At Risk for Falls  Timed Up & Go (TUG)  Time: 21 seconds  Observations: Slow tentative pace  An older adult who takes >=12 seconds to complete the TUG is at risk for falling.    RW use  Sit to Stand Testing      The patient completed 5 repetitions  of a sit to stand transfer in 30 seconds. BUE and armrest use, RW there for support         Ambulation Assistance Required  Surface With  Assistive Device Without Assistive Device Details   Level Independent        Uneven Independent       Curb           Stairs Assistance Required   Assistance Level Upper Extremity Support Pattern   Ascending Supervision One rail     Descending              Gait Analysis  Gait Pattern: Antalgic, Ataxic                   Treatment:  Therapeutic Activity  TA 10: HEP + education    Time Entry(in minutes):  PT Evaluation (Low) Time Entry: 40  Therapeutic Activity Time Entry: 10    Assessment & Plan   Assessment  Vick presents with a condition of Low complexity.   Presentation of Symptoms: Stable  Will Comorbidities Impact Care: Yes       Functional Limitations: Activity tolerance, Transfers, Participating in leisure activities, Pain with ADLs/IADLs, Performing household chores, Range of motion, Functional mobility, Disrupted sleep pattern, Carrying objects, Completing work/school activities, Decreased ambulation distance/endurance, Gait limitations, Getting off the floor, Maintaining balance, Reaching, Sitting tolerance, Squatting, Bed mobility  Impairments: Abnormal gait, Abnormal or restricted range of motion, Activity intolerance, Impaired balance, Impaired physical strength, Lack of appropriate home exercise program, Pain with functional activity    Patient Goal for Therapy (PT): 12 less low back pain and stronger legs.  Prognosis: Fair  Assessment Details: Patient is a 69-year-old male status post L5-S1 hardware removal and L4-L5 oblique interbody fusion on May 15th of 2025 presenting to outpatient physical therapy.  Patient displays decreased bilateral lower extremity strength more left-sided than right, left-sided lumbar radicular pain that will radiate down to lateral left leg, decreased lumbar range of motion (abiding by precautions), and pain and limitation with prolonged  walking standing activities.  Patient presents with moderate irritability level for lumbar facet and lumbar radiculopathy with physical deconditioning.     Plan  From a physical therapy perspective, the patient would benefit from: Skilled Rehab Services    Planned therapy interventions include: Therapeutic exercise, Therapeutic activities, Neuromuscular re-education, Manual therapy, ADLs/IADLs, Gait training, Orthotic management and training, and Wound care.    Planned modalities to include: Electrical stimulation - attended, Electrical stimulation - passive/unattended, Cryotherapy (cold pack), and Thermotherapy (hot pack).        Visit Frequency: 2 times Per Week for 8 Weeks.       This plan was discussed with Patient.   Discussion participants: Agreed Upon Plan of Care             The patient's spiritual, cultural, and educational needs were considered, and the patient is agreeable to the plan of care and goals.     Education  Education was done with Patient. The patient's learning style includes Listening. The patient Demonstrates understanding.         Rehab process and prognosis, Importance of home exercise program, Avoidance of concordant pain(s), and Rest as needed       Goals:   Active       Ambulation/movement       Patient will walk with SPC for 600' with no LOB and minimal low back pain.       Start:  08/04/25    Expected End:  09/19/25            Patient will climb stairs at Mod I with good gait mechanics       Start:  08/04/25    Expected End:  09/19/25               Changing body position       Patient will demonstrate moving sit to stand 10x with minutes upper extremity use.       Start:  08/04/25    Expected End:  09/19/25               Functional outcome       Patient will show a significant change in FOTO patient-reported outcome tool to demonstrate subjective improvement       Start:  08/04/25    Expected End:  09/19/25            Patient stated goal: to have less low back pain and stronger legs.         Start:  08/04/25    Expected End:  09/19/25            Patient will demonstrate independence in home program for support of progression       Start:  08/04/25    Expected End:  09/19/25               Outcome measures       Pt will score a time of </= 14'' on TUG for decreased fall risk       Start:  08/04/25    Expected End:  09/19/25               Pain       Patient will report pain of 2/10 demonstrating a reduction of overall pain       Start:  08/04/25    Expected End:  09/19/25               Range of Motion       Patient will achieve spinal AROM to 50% excluding sidebending and extension planes       Start:  08/04/25    Expected End:  09/19/25               Strength       Patient will achieve bilateral hip strength of 4/5       Start:  08/04/25    Expected End:  09/19/25            Patient will achieve bilateral knee strength of 4+/5       Start:  08/04/25    Expected End:  09/19/25            Patient will achieve bilateral ankle dorsiflexion strength of 4+/5       Start:  08/04/25    Expected End:  09/19/25                Jj Garcia, PT, DPT

## 2025-08-05 DIAGNOSIS — G89.18 POST-OPERATIVE PAIN: ICD-10-CM

## 2025-08-05 RX ORDER — METOPROLOL SUCCINATE 200 MG/1
200 TABLET, EXTENDED RELEASE ORAL
Qty: 90 TABLET | Refills: 3 | Status: SHIPPED | OUTPATIENT
Start: 2025-08-05

## 2025-08-05 RX ORDER — OXYCODONE AND ACETAMINOPHEN 10; 325 MG/1; MG/1
1 TABLET ORAL EVERY 6 HOURS PRN
Qty: 28 TABLET | Refills: 0 | Status: SHIPPED | OUTPATIENT
Start: 2025-08-05

## 2025-08-05 NOTE — TELEPHONE ENCOUNTER
Copied from CRM #[id number]. Topic: Medications - Medication Refill  >> Aug 5, 2025 10:02 AM Dianne wrote:  Call the clinic reply in [facility]: N       Please refill the medication(s) listed below. Please call the patient when the prescription(s) is ready for  at this phone number.[phone], pt would like to speak the nurse of the doctor as well.Please contact to further discuss and advise.      Medication #1:oxyCODONE-acetaminophen (PERCOCET)  mg per tablet    Medication #2:    Preferred Pharmacy:  .  [facility]/pharmacy #[id number] - [place] - [address]  [address] [address]  Phone: [phone] Fax: [phone]

## 2025-08-05 NOTE — TELEPHONE ENCOUNTER
Care Due:                  Date            Visit Type   Department     Provider  --------------------------------------------------------------------------------                                EP -                              [facility]  Last Visit: 03-      CARE ([facility])   [facility]       Emanuel S John                              [facility]  Next Visit: 08-      CARE ([facility])   [facility]                                                            Last  Test          Frequency    Reason                     Performed    Due Date  --------------------------------------------------------------------------------    HBA1C.......  6 months...  insulin, tirzepatide.....  03- 09-    Uric Acid...  12 months..  allopurinoL..............  Not Found    Overdue    [employer] Embedded Care Due Messages. Reference number: [id number].   8/05/2025 4:45:53 PM CDT

## 2025-08-11 DIAGNOSIS — G89.18 POST-OPERATIVE PAIN: ICD-10-CM

## 2025-08-12 ENCOUNTER — TELEPHONE (OUTPATIENT)
Dept: NEUROSURGERY | Facility: CLINIC | Age: 70
End: 2025-08-12
Payer: MEDICARE

## 2025-08-12 RX ORDER — OXYCODONE AND ACETAMINOPHEN 10; 325 MG/1; MG/1
1 TABLET ORAL EVERY 6 HOURS PRN
Qty: 28 TABLET | Refills: 0 | Status: SHIPPED | OUTPATIENT
Start: 2025-08-12

## 2025-08-19 ENCOUNTER — HOSPITAL ENCOUNTER (OUTPATIENT)
Dept: RADIOLOGY | Facility: HOSPITAL | Age: 70
Discharge: HOME | End: 2025-08-19
Attending: NEUROLOGICAL SURGERY
Payer: MEDICARE

## 2025-08-19 ENCOUNTER — HOSPITAL ENCOUNTER (OUTPATIENT)
Facility: HOSPITAL | Age: 70
Discharge: HOME | End: 2025-08-19
Attending: NEUROLOGICAL SURGERY
Payer: MEDICARE

## 2025-08-19 ENCOUNTER — OFFICE VISIT (OUTPATIENT)
Dept: FAMILY MEDICINE | Facility: CLINIC | Age: 70
End: 2025-08-19
Payer: MEDICARE

## 2025-08-19 ENCOUNTER — OFFICE VISIT (OUTPATIENT)
Dept: NEUROSURGERY | Facility: CLINIC | Age: 70
End: 2025-08-19
Payer: MEDICARE

## 2025-08-19 VITALS
BODY MASS INDEX: 32.25 KG/M2 | SYSTOLIC BLOOD PRESSURE: 157 MMHG | HEART RATE: 73 BPM | DIASTOLIC BLOOD PRESSURE: 78 MMHG | WEIGHT: 251.31 LBS | HEIGHT: 74 IN

## 2025-08-19 VITALS
SYSTOLIC BLOOD PRESSURE: 132 MMHG | HEART RATE: 73 BPM | TEMPERATURE: 98 F | BODY MASS INDEX: 32.25 KG/M2 | WEIGHT: 251.31 LBS | DIASTOLIC BLOOD PRESSURE: 64 MMHG | OXYGEN SATURATION: 96 % | HEIGHT: 74 IN

## 2025-08-19 DIAGNOSIS — Z96.89 SPINAL CORD STIMULATOR STATUS: ICD-10-CM

## 2025-08-19 DIAGNOSIS — Z98.1 S/P LUMBAR FUSION: ICD-10-CM

## 2025-08-19 DIAGNOSIS — Z79.4 TYPE 2 DIABETES MELLITUS WITH DIABETIC POLYNEUROPATHY, WITH LONG-TERM CURRENT USE OF INSULIN: Chronic | ICD-10-CM

## 2025-08-19 DIAGNOSIS — E78.5 HYPERLIPIDEMIA ASSOCIATED WITH TYPE 2 DIABETES MELLITUS: ICD-10-CM

## 2025-08-19 DIAGNOSIS — Z98.1 ARTHRODESIS STATUS: Primary | ICD-10-CM

## 2025-08-19 DIAGNOSIS — M47.12 CERVICAL SPONDYLOSIS WITH MYELOPATHY: ICD-10-CM

## 2025-08-19 DIAGNOSIS — E11.9 DIABETES MELLITUS TYPE 2, INSULIN DEPENDENT: Primary | ICD-10-CM

## 2025-08-19 DIAGNOSIS — G89.18 POST-OPERATIVE PAIN: ICD-10-CM

## 2025-08-19 DIAGNOSIS — I15.2 HYPERTENSION ASSOCIATED WITH DIABETES: ICD-10-CM

## 2025-08-19 DIAGNOSIS — E11.59 HYPERTENSION ASSOCIATED WITH DIABETES: ICD-10-CM

## 2025-08-19 DIAGNOSIS — N52.9 ERECTILE DYSFUNCTION, UNSPECIFIED ERECTILE DYSFUNCTION TYPE: ICD-10-CM

## 2025-08-19 DIAGNOSIS — N18.30 STAGE 3 CHRONIC KIDNEY DISEASE, UNSPECIFIED WHETHER STAGE 3A OR 3B CKD: Chronic | ICD-10-CM

## 2025-08-19 DIAGNOSIS — Z79.4 DIABETES MELLITUS TYPE 2, INSULIN DEPENDENT: Primary | ICD-10-CM

## 2025-08-19 DIAGNOSIS — Z98.1 STATUS POST LUMBAR SPINAL FUSION: ICD-10-CM

## 2025-08-19 DIAGNOSIS — E11.42 TYPE 2 DIABETES MELLITUS WITH DIABETIC POLYNEUROPATHY, WITH LONG-TERM CURRENT USE OF INSULIN: Chronic | ICD-10-CM

## 2025-08-19 DIAGNOSIS — E11.69 HYPERLIPIDEMIA ASSOCIATED WITH TYPE 2 DIABETES MELLITUS: ICD-10-CM

## 2025-08-19 DIAGNOSIS — Z98.1 STATUS POST CERVICAL SPINAL FUSION: ICD-10-CM

## 2025-08-19 PROCEDURE — 99999 PR PBB SHADOW E&M-EST. PATIENT-LVL V: CPT | Mod: PBBFAC,,,

## 2025-08-19 PROCEDURE — 3044F HG A1C LEVEL LT 7.0%: CPT | Mod: CPTII,S$GLB,,

## 2025-08-19 PROCEDURE — 3288F FALL RISK ASSESSMENT DOCD: CPT | Mod: CPTII,S$GLB,, | Performed by: NEUROLOGICAL SURGERY

## 2025-08-19 PROCEDURE — 3060F POS MICROALBUMINURIA REV: CPT | Mod: CPTII,S$GLB,,

## 2025-08-19 PROCEDURE — 3078F DIAST BP <80 MM HG: CPT | Mod: CPTII,S$GLB,, | Performed by: NEUROLOGICAL SURGERY

## 2025-08-19 PROCEDURE — 99999 PR PBB SHADOW E&M-EST. PATIENT-LVL IV: CPT | Mod: PBBFAC,,, | Performed by: NEUROLOGICAL SURGERY

## 2025-08-19 PROCEDURE — 3066F NEPHROPATHY DOC TX: CPT | Mod: CPTII,S$GLB,,

## 2025-08-19 PROCEDURE — 3044F HG A1C LEVEL LT 7.0%: CPT | Mod: CPTII,S$GLB,, | Performed by: NEUROLOGICAL SURGERY

## 2025-08-19 PROCEDURE — 1125F AMNT PAIN NOTED PAIN PRSNT: CPT | Mod: CPTII,S$GLB,, | Performed by: NEUROLOGICAL SURGERY

## 2025-08-19 PROCEDURE — 72082 X-RAY EXAM ENTIRE SPI 2/3 VW: CPT | Mod: TC,PN

## 2025-08-19 PROCEDURE — 1101F PT FALLS ASSESS-DOCD LE1/YR: CPT | Mod: CPTII,S$GLB,,

## 2025-08-19 PROCEDURE — 1160F RVW MEDS BY RX/DR IN RCRD: CPT | Mod: CPTII,S$GLB,,

## 2025-08-19 PROCEDURE — 3077F SYST BP >= 140 MM HG: CPT | Mod: CPTII,S$GLB,, | Performed by: NEUROLOGICAL SURGERY

## 2025-08-19 PROCEDURE — 3075F SYST BP GE 130 - 139MM HG: CPT | Mod: CPTII,S$GLB,,

## 2025-08-19 PROCEDURE — 3288F FALL RISK ASSESSMENT DOCD: CPT | Mod: CPTII,S$GLB,,

## 2025-08-19 PROCEDURE — 3066F NEPHROPATHY DOC TX: CPT | Mod: CPTII,S$GLB,, | Performed by: NEUROLOGICAL SURGERY

## 2025-08-19 PROCEDURE — 3060F POS MICROALBUMINURIA REV: CPT | Mod: CPTII,S$GLB,, | Performed by: NEUROLOGICAL SURGERY

## 2025-08-19 PROCEDURE — 1159F MED LIST DOCD IN RCRD: CPT | Mod: CPTII,S$GLB,, | Performed by: NEUROLOGICAL SURGERY

## 2025-08-19 PROCEDURE — 72100 X-RAY EXAM L-S SPINE 2/3 VWS: CPT | Mod: TC

## 2025-08-19 PROCEDURE — 72100 X-RAY EXAM L-S SPINE 2/3 VWS: CPT | Mod: 26,,, | Performed by: RADIOLOGY

## 2025-08-19 PROCEDURE — 3008F BODY MASS INDEX DOCD: CPT | Mod: CPTII,S$GLB,,

## 2025-08-19 PROCEDURE — 3078F DIAST BP <80 MM HG: CPT | Mod: CPTII,S$GLB,,

## 2025-08-19 PROCEDURE — G2211 COMPLEX E/M VISIT ADD ON: HCPCS | Mod: S$GLB,,,

## 2025-08-19 PROCEDURE — 99215 OFFICE O/P EST HI 40 MIN: CPT | Mod: S$GLB,,,

## 2025-08-19 PROCEDURE — 1125F AMNT PAIN NOTED PAIN PRSNT: CPT | Mod: CPTII,S$GLB,,

## 2025-08-19 PROCEDURE — 1159F MED LIST DOCD IN RCRD: CPT | Mod: CPTII,S$GLB,,

## 2025-08-19 PROCEDURE — 1101F PT FALLS ASSESS-DOCD LE1/YR: CPT | Mod: CPTII,S$GLB,, | Performed by: NEUROLOGICAL SURGERY

## 2025-08-19 PROCEDURE — 3008F BODY MASS INDEX DOCD: CPT | Mod: CPTII,S$GLB,, | Performed by: NEUROLOGICAL SURGERY

## 2025-08-19 PROCEDURE — 99214 OFFICE O/P EST MOD 30 MIN: CPT | Mod: S$GLB,,, | Performed by: NEUROLOGICAL SURGERY

## 2025-08-19 RX ORDER — SILDENAFIL 100 MG/1
100 TABLET, FILM COATED ORAL DAILY PRN
Qty: 90 TABLET | Refills: 0 | Status: SHIPPED | OUTPATIENT
Start: 2025-08-19 | End: 2025-11-17

## 2025-08-19 RX ORDER — SILDENAFIL 50 MG/1
50 TABLET, FILM COATED ORAL DAILY PRN
Qty: 30 TABLET | Refills: 11 | Status: SHIPPED | OUTPATIENT
Start: 2025-08-19 | End: 2025-08-19

## 2025-08-19 RX ORDER — FUROSEMIDE 20 MG/1
40 TABLET ORAL DAILY PRN
Qty: 60 TABLET | Refills: 0 | Status: SHIPPED | OUTPATIENT
Start: 2025-08-19 | End: 2025-09-18

## 2025-08-19 RX ORDER — TIRZEPATIDE 5 MG/.5ML
5 INJECTION, SOLUTION SUBCUTANEOUS
Qty: 2 ML | Refills: 1 | Status: SHIPPED | OUTPATIENT
Start: 2025-08-19

## 2025-08-19 RX ORDER — OXYCODONE AND ACETAMINOPHEN 10; 325 MG/1; MG/1
1 TABLET ORAL EVERY 8 HOURS PRN
Qty: 90 TABLET | Refills: 0 | Status: SHIPPED | OUTPATIENT
Start: 2025-08-19 | End: 2025-08-19 | Stop reason: SDUPTHER

## 2025-08-20 ENCOUNTER — TELEPHONE (OUTPATIENT)
Dept: NEUROSURGERY | Facility: CLINIC | Age: 70
End: 2025-08-20
Payer: MEDICARE

## 2025-08-20 RX ORDER — OXYCODONE AND ACETAMINOPHEN 10; 325 MG/1; MG/1
1 TABLET ORAL EVERY 8 HOURS PRN
Qty: 90 TABLET | Refills: 0 | Status: SHIPPED | OUTPATIENT
Start: 2025-08-20

## 2025-09-04 ENCOUNTER — TELEPHONE (OUTPATIENT)
Dept: NEUROSURGERY | Facility: CLINIC | Age: 70
End: 2025-09-04
Payer: MEDICARE

## 2025-09-05 ENCOUNTER — TELEPHONE (OUTPATIENT)
Dept: NEUROSURGERY | Facility: CLINIC | Age: 70
End: 2025-09-05
Payer: MEDICARE

## 2025-09-05 ENCOUNTER — OFFICE VISIT (OUTPATIENT)
Dept: FAMILY MEDICINE | Facility: CLINIC | Age: 70
End: 2025-09-05
Payer: MEDICARE

## 2025-09-05 VITALS
TEMPERATURE: 98 F | BODY MASS INDEX: 33.38 KG/M2 | WEIGHT: 260.13 LBS | HEIGHT: 74 IN | DIASTOLIC BLOOD PRESSURE: 80 MMHG | OXYGEN SATURATION: 98 % | HEART RATE: 81 BPM | SYSTOLIC BLOOD PRESSURE: 150 MMHG

## 2025-09-05 DIAGNOSIS — E11.42 TYPE 2 DIABETES MELLITUS WITH DIABETIC POLYNEUROPATHY, WITH LONG-TERM CURRENT USE OF INSULIN: ICD-10-CM

## 2025-09-05 DIAGNOSIS — M47.12 CERVICAL SPONDYLOSIS WITH MYELOPATHY: Primary | ICD-10-CM

## 2025-09-05 DIAGNOSIS — Z23 NEED FOR PROPHYLACTIC VACCINATION AGAINST STREPTOCOCCUS PNEUMONIAE (PNEUMOCOCCUS): ICD-10-CM

## 2025-09-05 DIAGNOSIS — Z98.1 S/P CERVICAL SPINAL FUSION: ICD-10-CM

## 2025-09-05 DIAGNOSIS — E11.69 HYPERLIPIDEMIA ASSOCIATED WITH TYPE 2 DIABETES MELLITUS: ICD-10-CM

## 2025-09-05 DIAGNOSIS — I15.2 HYPERTENSION ASSOCIATED WITH DIABETES: ICD-10-CM

## 2025-09-05 DIAGNOSIS — E11.9 DIABETES MELLITUS TYPE 2, INSULIN DEPENDENT: Primary | ICD-10-CM

## 2025-09-05 DIAGNOSIS — Z79.4 TYPE 2 DIABETES MELLITUS WITH DIABETIC POLYNEUROPATHY, WITH LONG-TERM CURRENT USE OF INSULIN: ICD-10-CM

## 2025-09-05 DIAGNOSIS — G95.9 CERVICAL MYELOPATHY: ICD-10-CM

## 2025-09-05 DIAGNOSIS — E11.59 HYPERTENSION ASSOCIATED WITH DIABETES: ICD-10-CM

## 2025-09-05 DIAGNOSIS — Z94.4 LIVER TRANSPLANT STATUS: ICD-10-CM

## 2025-09-05 DIAGNOSIS — E78.5 HYPERLIPIDEMIA ASSOCIATED WITH TYPE 2 DIABETES MELLITUS: ICD-10-CM

## 2025-09-05 DIAGNOSIS — Z01.818 PREOP EXAMINATION: ICD-10-CM

## 2025-09-05 DIAGNOSIS — Z79.4 DIABETES MELLITUS TYPE 2, INSULIN DEPENDENT: Primary | ICD-10-CM

## 2025-09-05 DIAGNOSIS — Z79.4 DIABETES MELLITUS DUE TO UNDERLYING CONDITION WITH HYPERGLYCEMIA, WITH LONG-TERM CURRENT USE OF INSULIN: ICD-10-CM

## 2025-09-05 DIAGNOSIS — E08.65 DIABETES MELLITUS DUE TO UNDERLYING CONDITION WITH HYPERGLYCEMIA, WITH LONG-TERM CURRENT USE OF INSULIN: ICD-10-CM

## 2025-09-05 DIAGNOSIS — G89.4 CHRONIC PAIN SYNDROME: ICD-10-CM

## 2025-09-05 PROCEDURE — 99999 PR PBB SHADOW E&M-EST. PATIENT-LVL IV: CPT | Mod: PBBFAC,,, | Performed by: FAMILY MEDICINE

## 2025-09-05 RX ORDER — ATORVASTATIN CALCIUM 40 MG/1
40 TABLET, FILM COATED ORAL DAILY
Qty: 90 TABLET | Refills: 3 | Status: SHIPPED | OUTPATIENT
Start: 2025-09-05 | End: 2025-09-05 | Stop reason: SDUPTHER

## 2025-09-05 RX ORDER — ATORVASTATIN CALCIUM 40 MG/1
40 TABLET, FILM COATED ORAL DAILY
Qty: 90 TABLET | Refills: 3 | Status: SHIPPED | OUTPATIENT
Start: 2025-09-05 | End: 2026-09-05

## 2025-09-05 RX ORDER — LISINOPRIL 20 MG/1
20 TABLET ORAL DAILY
Qty: 90 TABLET | Refills: 3 | Status: SHIPPED | OUTPATIENT
Start: 2025-09-05

## 2025-09-05 RX ORDER — LISINOPRIL 20 MG/1
20 TABLET ORAL DAILY
Qty: 90 TABLET | Refills: 3 | Status: SHIPPED | OUTPATIENT
Start: 2025-09-05 | End: 2025-09-05 | Stop reason: SDUPTHER

## (undated) DEVICE — DRESSING AQUACEL FOAM 4X4IN

## (undated) DEVICE — DRAPE STERI-DRAPE 1000 17X11IN

## (undated) DEVICE — ADHESIVE DERMABOND ADVANCED

## (undated) DEVICE — ELECTRODE REM PLYHSV RETURN 9

## (undated) DEVICE — SEE MEDLINE ITEM 156905

## (undated) DEVICE — KIT POWDER ABSORBABLE GELATIN

## (undated) DEVICE — CATH JACKY RADIAL 5FR 100CM

## (undated) DEVICE — DRAPE TOP 53X102IN

## (undated) DEVICE — SUT MONOCRYL 4-0 PS-2

## (undated) DEVICE — TUBING SUC UNIV W/CONN 12FT

## (undated) DEVICE — DRESSING SURGICAL 1/2X1/2

## (undated) DEVICE — GLOVE BIOGEL PI MICRO SZ 7.5

## (undated) DEVICE — DRESSING LEUKOPLAST FLEX 1X3IN

## (undated) DEVICE — TOWEL OR NONABSORB ADH 17X26

## (undated) DEVICE — PAD PREP 50/CA

## (undated) DEVICE — DRESSING TEGADERM 2 3/8 X 2.75

## (undated) DEVICE — SUT 0 VICRYL / UR6 (J603)

## (undated) DEVICE — SKINMARKER W/RULER DEVON

## (undated) DEVICE — SUT SILK 3-0 BLK BR SH 30IN

## (undated) DEVICE — CORD BIPOLAR 12 FOOT

## (undated) DEVICE — STOCKINET 4INX48

## (undated) DEVICE — SEE MEDLINE ITEM 157117

## (undated) DEVICE — DRAPE C-ARMOR EQUIPMENT COVER

## (undated) DEVICE — ALCOHOL 70% ISOP W/GREEN 16OZ

## (undated) DEVICE — NDL HYPO REG 25G X 1 1/2

## (undated) DEVICE — DRESSING TRANS 4X4 3/4

## (undated) DEVICE — COVER OVERHEAD SURG LT BLUE

## (undated) DEVICE — DRAPE C-ARM/MOBILE XRAY 44X80

## (undated) DEVICE — SYR 10CC LUER LOCK

## (undated) DEVICE — SPONGE COTTON TRAY 4X4IN

## (undated) DEVICE — SUT 2-0 ETHILON 18 FS

## (undated) DEVICE — ADHESIVE MASTISOL VIAL 48/BX

## (undated) DEVICE — COVER PROXIMA MAYO STAND

## (undated) DEVICE — COVER TABLE HVY DTY 60X90IN

## (undated) DEVICE — CORD CAUTERY BIPOLAR STERILE

## (undated) DEVICE — NDL SPINAL SPINOCAN 22GX3.5

## (undated) DEVICE — BLADE SURG #15 CARBON STEEL

## (undated) DEVICE — GLOVE SURG BIOGEL LATEX SZ 7.5

## (undated) DEVICE — GOWN POLY REINF BRTH SLV LG

## (undated) DEVICE — GOWN POLY REINF BRTH SLV XL

## (undated) DEVICE — ELECTRODE BLADE INSULATED 1 IN

## (undated) DEVICE — SUT CTD VICRYL 3-0 CR/SH

## (undated) DEVICE — PENCIL ROCKER SWITCH 10FT CORD

## (undated) DEVICE — DRAPE LEICA MICROSCOPE 48X120

## (undated) DEVICE — SEE MEDLINE ITEM 157173

## (undated) DEVICE — SUT MCRYL PLUS 4-0 PS2 27IN

## (undated) DEVICE — CONTRAST VISIPAQUE 150ML

## (undated) DEVICE — HARNESS CERVICAL VIS 18.5X44IN

## (undated) DEVICE — SUT ETHILON 3-0 PS2 18 BLK

## (undated) DEVICE — SUT SILK 2-0 SH 18IN BLACK

## (undated) DEVICE — CATH IMPULSE 5FR PIGTAIL 125CM

## (undated) DEVICE — SPIKE SHORT LG BORE 1-WAY 2IN

## (undated) DEVICE — BLADE ELECTRO EDGE INSULATED

## (undated) DEVICE — GLOVE BIOGEL ECLIPSE SZ 7

## (undated) DEVICE — COVER SNAP KAP 30

## (undated) DEVICE — DRESSING TRANS 8X12 TEGADERM

## (undated) DEVICE — DRESSING TELFA N ADH 3X8

## (undated) DEVICE — KIT HEADER ACC EPG 2 PORT

## (undated) DEVICE — GAUZE SPONGE PEANUT STRL

## (undated) DEVICE — TUBE FRAZIER 5MM 2FT SOFT TIP

## (undated) DEVICE — HEMOSTAT VASC BAND REG 24CM

## (undated) DEVICE — PACK ECLIPSE UNIVERSAL STERILE

## (undated) DEVICE — GAUZE WOVEN STRL 8PLY 2X2IN

## (undated) DEVICE — Device

## (undated) DEVICE — KIT SPINAL PATIENT CARE JACK

## (undated) DEVICE — DRESSING AQUACEL FOAM 5 X 5

## (undated) DEVICE — PAD CAST SPECIALIST 2X4

## (undated) DEVICE — SPHERE MARKER REFLECTIVE DISP

## (undated) DEVICE — CATH INFINITI JUDKINS JR4

## (undated) DEVICE — CHLORAPREP 3ML APPLICATOR TINT

## (undated) DEVICE — KIT EVACUATOR 3-SPRING 1/8 DRN

## (undated) DEVICE — CABLE MULTILEAD TRIAL

## (undated) DEVICE — APPLICATOR CHLORAPREP ORN 26ML

## (undated) DEVICE — SUT 0 8-27IN VICRYL PL CT-1

## (undated) DEVICE — SEE MEDLINE ITEM 152522

## (undated) DEVICE — CLOSURE SKIN STERI STRIP 1/2X4

## (undated) DEVICE — SEE MEDLINE ITEM 156955

## (undated) DEVICE — SUT VICRYL 3-0 27 SH

## (undated) DEVICE — TRAY FOLEY 16FR INFECTION CONT

## (undated) DEVICE — DRAPE LAP T SHT W/ INSTR PAD

## (undated) DEVICE — GELATIN SURGIPOWDER ABSORBABLE

## (undated) DEVICE — DRAPE INCISE IOBAN 2 23X23IN

## (undated) DEVICE — BANDAGE MATRIX HK LOOP 2IN 5YD

## (undated) DEVICE — CATH IV INTROCAN 14G X 2.

## (undated) DEVICE — MANIFOLD 4 PORT

## (undated) DEVICE — MARKER SKIN RULER AND LABEL

## (undated) DEVICE — COVER PROBE US 5.5X58L NON LTX

## (undated) DEVICE — TRAY CATH 1-LYR URIMTR 16FR

## (undated) DEVICE — NDL HYPO STD REG BVL 22GX1.5IN

## (undated) DEVICE — SUT VICRYL 2 0 CT 2

## (undated) DEVICE — GAUZE SPONGE 4X4 12PLY

## (undated) DEVICE — PACK BASIC

## (undated) DEVICE — SEE MEDLINE ITEM 157116

## (undated) DEVICE — SUT MONOCYRL 4-0 PS2 UND

## (undated) DEVICE — GLOVE BIOGEL PI MICRO INDIC 8

## (undated) DEVICE — DRAIN CHANNEL ROUND 10FR

## (undated) DEVICE — CLIPPER BLADE MOD 4406 (CAREF)

## (undated) DEVICE — SUT VICRYL PLUS 2-0 CT1 18

## (undated) DEVICE — KIT GLIDESHEATH SLEND 6FR 10CM

## (undated) DEVICE — BUR SURGICAL 13CM

## (undated) DEVICE — DRAPE ANGIO BRACH 38X44IN

## (undated) DEVICE — DRAPE THYROID SOFT STERILE

## (undated) DEVICE — SUPPORT ULNA NERVE PROTECTOR

## (undated) DEVICE — DRAPE MICROSCOPE

## (undated) DEVICE — KIT LEFT HEART MANIFOLD CUSTOM

## (undated) DEVICE — PACK SURGERY START

## (undated) DEVICE — GLOVE BIOGEL SKINSENSE PI 7.5

## (undated) DEVICE — BLADE SCALP OPHTL BEVEL STR

## (undated) DEVICE — BURR MIS CURVED 3.0MM

## (undated) DEVICE — BLADE ELECTRO EXTENDED.

## (undated) DEVICE — EVACUATOR WOUND BULB 100CC

## (undated) DEVICE — SEE MEDLINE ITEM 152622

## (undated) DEVICE — SUT MONOCRYL 3-0 PS-2 UND

## (undated) DEVICE — SEE L#120831

## (undated) DEVICE — SUT VICRYL+ 27 UR-6 VIOL

## (undated) DEVICE — STAPLER SKIN ROTATING HEAD

## (undated) DEVICE — DRAPE SURG W/TWL 17 5/8X23

## (undated) DEVICE — PAD DEFIB CADENCE ADULT R2